# Patient Record
Sex: MALE | Race: WHITE | NOT HISPANIC OR LATINO | Employment: FULL TIME | URBAN - METROPOLITAN AREA
[De-identification: names, ages, dates, MRNs, and addresses within clinical notes are randomized per-mention and may not be internally consistent; named-entity substitution may affect disease eponyms.]

---

## 2017-01-03 ENCOUNTER — TRANSFERRED RECORDS (OUTPATIENT)
Dept: HEALTH INFORMATION MANAGEMENT | Facility: CLINIC | Age: 67
End: 2017-01-03

## 2017-02-02 ENCOUNTER — TELEPHONE (OUTPATIENT)
Dept: INTERNAL MEDICINE | Facility: CLINIC | Age: 67
End: 2017-02-02

## 2017-02-02 DIAGNOSIS — R60.0 PERIPHERAL EDEMA: Primary | ICD-10-CM

## 2017-02-02 RX ORDER — POTASSIUM CHLORIDE 750 MG/1
10 TABLET, EXTENDED RELEASE ORAL DAILY
Qty: 30 TABLET | Refills: 9 | Status: SHIPPED | OUTPATIENT
Start: 2017-02-02 | End: 2017-03-09

## 2017-02-02 NOTE — TELEPHONE ENCOUNTER
potassium chloride       Last Written Prescription Date: 11/29/16  Last Fill Quantity: 30, # refills: 1  Last Office Visit with G, P or [facility] prescribing provider: 12/22/16        Next 5 appointments (look out 90 days)     Feb 03, 2017  8:00 AM   Pre[facility] with [facility]    [address] [place] [address] [phone]                   POTASSIUM   Date Value Ref Range Status   12/09/2016 4.1 3.4 - 5.3 mmol/L Final     CREATININE   Date Value Ref Range Status   12/09/2016 1.10 0.66 - 1.25 mg/dL Final     BP Readings from Last 3 Encounters:   12/22/16 132/80   12/16/16 170/96   12/09/16 176/106

## 2017-02-03 ENCOUNTER — OFFICE VISIT (OUTPATIENT)
Dept: INTERNAL MEDICINE | Facility: CLINIC | Age: 67
End: 2017-02-03
Payer: COMMERCIAL

## 2017-02-03 VITALS
SYSTOLIC BLOOD PRESSURE: 130 MMHG | BODY MASS INDEX: 42.59 KG/M2 | WEIGHT: 265 LBS | OXYGEN SATURATION: 96 % | DIASTOLIC BLOOD PRESSURE: 80 MMHG | TEMPERATURE: 98.2 F | HEART RATE: 87 BPM | HEIGHT: 66 IN

## 2017-02-03 DIAGNOSIS — R60.0 PERIPHERAL EDEMA: ICD-10-CM

## 2017-02-03 DIAGNOSIS — I10 ESSENTIAL HYPERTENSION: ICD-10-CM

## 2017-02-03 DIAGNOSIS — E66.01 MORBID OBESITY DUE TO EXCESS CALORIES (H): ICD-10-CM

## 2017-02-03 DIAGNOSIS — G47.33 OSA (OBSTRUCTIVE SLEEP APNEA): ICD-10-CM

## 2017-02-03 DIAGNOSIS — Z01.818 PREOPERATIVE EXAMINATION: Primary | ICD-10-CM

## 2017-02-03 DIAGNOSIS — F10.20 ALCOHOLISM (H): ICD-10-CM

## 2017-02-03 DIAGNOSIS — I48.19 PERSISTENT ATRIAL FIBRILLATION (H): ICD-10-CM

## 2017-02-03 PROBLEM — R73.01 IMPAIRED FASTING GLUCOSE: Status: ACTIVE | Noted: 2017-02-03

## 2017-02-03 LAB
ANION GAP SERPL CALCULATED.3IONS-SCNC: 7 MMOL/L (ref 3–14)
BUN SERPL-MCNC: 26 MG/DL (ref 7–30)
CALCIUM SERPL-MCNC: 8.8 MG/DL (ref 8.5–10.1)
CHLORIDE SERPL-SCNC: 107 MMOL/L (ref 94–109)
CO2 SERPL-SCNC: 30 MMOL/L (ref 20–32)
CREAT SERPL-MCNC: 1.21 MG/DL (ref 0.66–1.25)
GFR SERPL CREATININE-BSD FRML MDRD: 60 ML/MIN/1.7M2
GLUCOSE SERPL-MCNC: 106 MG/DL (ref 70–99)
HGB BLD-MCNC: 16.2 G/DL (ref 13.3–17.7)
POTASSIUM SERPL-SCNC: 4 MMOL/L (ref 3.4–5.3)
SODIUM SERPL-SCNC: 144 MMOL/L (ref 133–144)

## 2017-02-03 PROCEDURE — 80048 BASIC METABOLIC PNL TOTAL CA: CPT | Performed by: INTERNAL MEDICINE

## 2017-02-03 PROCEDURE — 85018 HEMOGLOBIN: CPT | Performed by: INTERNAL MEDICINE

## 2017-02-03 PROCEDURE — 99215 OFFICE O/P EST HI 40 MIN: CPT | Performed by: INTERNAL MEDICINE

## 2017-02-03 PROCEDURE — 36415 COLL VENOUS BLD VENIPUNCTURE: CPT | Performed by: INTERNAL MEDICINE

## 2017-02-03 PROCEDURE — 93000 ELECTROCARDIOGRAM COMPLETE: CPT | Performed by: INTERNAL MEDICINE

## 2017-02-03 RX ORDER — FUROSEMIDE 20 MG
20 TABLET ORAL DAILY
Start: 2017-02-03 | End: 2017-03-09

## 2017-02-03 RX ORDER — AMLODIPINE BESYLATE 5 MG/1
5 TABLET ORAL DAILY
Start: 2017-02-03 | End: 2017-10-12

## 2017-02-03 NOTE — NURSING NOTE
"Chief Complaint   Patient presents with     Pre-Op Exam     right knee       Initial /80 mmHg  Pulse 87  Temp(Src) 98.2  F (36.8  C) (Oral)  Ht 5' 6\" (1.676 m)  Wt 265 lb (120.203 kg)  BMI 42.79 kg/m2  SpO2 96% Estimated body mass index is 42.79 kg/(m^2) as calculated from the following:    Height as of this encounter: 5' 6\" (1.676 m).    Weight as of this encounter: 265 lb (120.203 kg).  BP completed using cuff size: luis antonio Abarca CMA      "

## 2017-02-03 NOTE — PROGRESS NOTES
[address] [id number] [phone]  Dept: [phone]    PRE-OP EVALUATION:  Today's date: 2/3/2017    Mike Schultz (: 1950) presents for pre-operative evaluation assessment as requested by Dr. Malick Suarez.  He requires evaluation and anesthesia risk assessment prior to undergoing surgery/procedure for treatment of arthritic knee .  Proposed procedure: total right knee    Date of Surgery/ Procedure: 17  Time of Surgery/ Procedure: 7 am  Hospital/Surgical Facility: [facility]  Fax number for surgical facility: [phone]  Primary Physician: Johnny Kay  Type of Anesthesia Anticipated: General    Patient has a Health Care Directive or Living Will:  YES       HPI:                                                      Brief HPI related to upcoming procedure:  End stage DJD, had steroid injections a month ago, but lack of improvement.   Now scheduled for elective total knee arthroplasty.    Additional issues to address:  1.  Follow-up HTN.  Recently on amlodipine, blood pressure was high so increased to 5 mg daily.  Patient is checking outpatient blood pressures, at home.  Results are variable from 106//111, with average of just below 140/90.  He reports no side effects from BP medications.   2.  Regarding ankle edema, this is fully controlled now on lasix.  Voids a lot secondarily.    Taking potassium.  Wondering if should continue.   - following very low carb diet, has lost 22 lbs, some of this fluid weight.  3.  Follow-up obstructive sleep apnea.   Problems with CPAP at first.  Using nightly for 7-10 hours.  4.  Follow-up atrial fibrillation, having failed medication to convert.   Was to have had TKA a month ago, and then start Xarelto.    With TKA delayed, has not been on anticoagulation.   Discussed risks regarding stroke and persistent atrial fib.    5.  Follow-up alcohol abuse, longstanding.   Patient has stopped  alcohol completely 6 weeks ago.  Minor nervousness for a few days, no other withdrawal     MEDICAL HISTORY:                                                      Patient Active Problem List    Diagnosis Date Noted     Ascending aorta dilatation (H) 12/23/2016     Aortic root dilatation       BOWEN (obstructive sleep apnea) 09/30/2016     Split Night:  Sleep Study 9/20/2016 - (274.0 lbs) AHI 74.1, RDI 74.1, Supine AHI 79.1, REM AHI 43.6, Low O2% 61.6%, Time Spent ?88% 106.8, Time Spent ?89% 129.4, CPAP was titrated to a pressure of 15 with an AHI 15.6. Time spent in REM supine at this pressure was - minutes.       Advanced directives, counseling/discussion 01/05/2015     Advance Care Planning:   ACP Review and Resources Provided:  Reviewed chart for advance care plan.  Mike Schultz has no plan or code status on file. Discussed available resources and provided with information. Confirmed code status reflects current choices pending further ACP discussions.  Confirmed/documented designated decision maker(s). See permanent comments section of demographics in clinical tab.   Added by Shanthi Osei on 1/5/2015       Family history of ischemic heart disease 01/05/2015     Father MI 42, brother CABG mid 50's  Patient with perfusion study 7/2012       Gout 01/05/2015     Many episodes in right foot.   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  Often related to alcohol intake       Alcoholism (H)      Prior heavy abuse.  Went through treatments in past  [facility] 2016  Now drinking 5 - 6 shots of vodka equivalent per day as of 9/16/2016.        Atrial fibrillation (HCC)      Cardiologist Dr. Yates  Echo 12/2014 - LVH, borderline LV size, CHARBEL, 1-2+ MR, borderline prox aorta size       Tobacco use disorder, moderate, in sustained remission      dced 6/2004       HYPERLIPIDEMIA LDL GOAL <130 10/31/2010     Morbid obesity      Herpes simplex virus (HSV) infection      Problem list name updated by automated process. Provider to review       Allergic  rhinitis 05/12/2005     Problem list name updated by automated process. Provider to review       Essential hypertension 04/12/2005     Impotence of organic origin 04/12/2005      Past Medical History   Diagnosis Date     Essential hypertension, benign      Tobacco use disorder      dced 6/2004     Herpes simplex without mention of complication      Obesity      Hyperlipidemia      Atrial fibrillation (H)      persistent     H/O ETOH abuse      Past Surgical History   Procedure Laterality Date     C nonspecific procedure  1997     achilles tendon     C nonspecific procedure       knees, arthroscopy     C nonspecific procedure       basal cell skin ca, nose     C nonspecific procedure       flex sig; colonoscopy due 3/2008     C nonspecific procedure  2012     cardioversion for atr fib     Orthopedic surgery       Herniorrhaphy umbilical  8/20/2012     Procedure: HERNIORRHAPHY UMBILICAL;  UMBILICAL HERNIA REPAIR;  Surgeon: Ra Crocker MD;  Location: [facility]     Current Outpatient Prescriptions   Medication Sig Dispense Refill     potassium chloride SA (K-DUR/KLOR-CON M) 10 MEQ CR tablet Take 1 tablet (10 mEq) by mouth daily 30 tablet 9     furosemide (LASIX) 20 MG tablet Take 2 tablets (40 mg) by mouth daily 60 tablet 11     ASPIRIN PO Take 81 mg by mouth       amLODIPine (NORVASC) 5 MG tablet Take 0.5-1 tablets (2.5-5 mg) by mouth daily 90 tablet 1     metoprolol (LOPRESSOR) 50 MG tablet Take 1 tablet (50 mg) by mouth 2 times daily 60 tablet 1     Cyanocobalamin (VITAMIN B 12 PO)        order for DME Equipment ordered: RESMED CPAP Mask type: Full face  Settings: 15       losartan (COZAAR) 100 MG tablet Take 1 tablet (100 mg) by mouth daily 90 tablet 3     indomethacin (INDOCIN) 50 MG capsule Take 1 capsule (50 mg) by mouth 3 times daily as needed for moderate pain with food 30 capsule 1     allopurinol (ZYLOPRIM) 300 MG tablet        finasteride (PROPECIA) 1 MG tablet Take 1 mg by mouth daily.       OTC  "products: herbals and vitamins - cauliflower supplement, detox pill    Allergies   Allergen Reactions     No Known Drug Allergies       Latex Allergy: NO    Social History   Substance Use Topics     Smoking status: Former Smoker -- 1.00 packs/day for 10 years     Types: Cigarettes     Quit date: 08/20/2011     Smokeless tobacco: Never Used     Alcohol Use: Yes      Comment: 4-5 shots nightly vodka     History   Drug Use No       REVIEW OF SYSTEMS:                                                    C: NEGATIVE for fever, chills, unintentional change in weight  I: NEGATIVE for worrisome rashes, moles or lesions  E: NEGATIVE for vision changes or irritation  E/M: NEGATIVE for ear, mouth and throat problems  R: NEGATIVE for significant cough or SOB  B: NEGATIVE for masses, tenderness or discharge  CV: NEGATIVE for chest pain, palpitations, peripheral edema resolved  GI: NEGATIVE for nausea, abdominal pain, heartburn, or change in bowel habits  : NEGATIVE for frequency, dysuria, or hematuria  M: knee pains, otherwise NEGATIVE for significant arthralgias or myalgia.   Some low back pain   N: NEGATIVE for weakness, dizziness or paresthesias  E: NEGATIVE for temperature intolerance, skin/hair changes  H: NEGATIVE for bleeding problems  P: NEGATIVE for changes in mood or affect    EXAM:                                                    /80 mmHg  Pulse 87  Temp(Src) 98.2  F (36.8  C) (Oral)  Ht 5' 6\" (1.676 m)  Wt 265 lb (120.203 kg)  BMI 42.79 kg/m2  SpO2 96%    GENERAL APPEARANCE: healthy, alert and no distress     EYES: Eyes grossly normal to inspection     HENT: ear canals and TM's normal (dull bilat) and nose and mouth without ulcers or lesions     NECK: no adenopathy, no asymmetry, masses, or scars and thyroid normal to palpation     RESP: lungs clear to auscultation - no rales, rhonchi or wheezes     BREAST: normal without masses, tenderness or nipple discharge and no palpable axillary masses or " adenopathy     CV: regular rate and slightly irregular rhythm, normal S1 S2, no S3 or S4 and no murmur, click or rub.  No edema.     ABDOMEN:  soft, nontender, no HSM or masses and bowel sounds normal     MS: extremities normal- no gross deformities noted, no evidence of inflammation in joints, FROM in all extremities.     SKIN: no suspicious lesions or rashes, few seborrheic keratoses      NEURO: mentation intact and speech normal     PSYCH: mentation appears normal. and affect normal/bright     LYMPHATICS: normal ant/post cervical, supraclavicular nodes    DIAGNOSTICS:                                                    EKG: atrial fibrillation, rate controlled, normal axis, normal intervals, no acute ST/T changes c/w ischemia, no LVH by voltage criteria, unchanged from previous tracings    Recent Labs   Lab Test  12/09/16   1232  11/29/16   1840 04/28/16 12/23/14   1527   11/06/13   1549   08/16/12   0951   HGB   --    --   15.7   --   15.2   < >   --    --   16.9   PLT   --    --   299   --   217   < >   --    --    --    INR   --    --    --    --    --    --    --    --   0.95   NA  143  142   --    < >  143   --    --    < >  141   POTASSIUM  4.1  4.3   --    < >  4.3   --    --    < >  4.3   CR  1.10  1.02  1.1   < >  0.85   --    --    < >  1.14   A1C   --    --    --    --   5.8   --   5.5   --    --     < > = values in this interval not displayed.      Basic Metabolic Panel:  NA      144   2/3/2017   POTASSIUM      4.0   2/3/2017  CHLORIDE      107   2/3/2017  ASHELY      8.8   2/3/2017  CO2       30   2/3/2017  BUN       26   2/3/2017  CR     1.21   2/3/2017  GLC      106   2/3/2017    HEMOGLOBIN   Date Value Ref Range Status   02/03/2017 16.2 13.3 - 17.7 g/dL Final   04/28/2016 15.7 13.3 - 17.7 g/dL Final   ]     IMPRESSION:                                                    Reason for surgery/procedure:  End stage DJD both knees    Diagnosis/reason for consult:   1.  HTN, much improved, now  controlled  2.  Obstructive sleep apnea, fully treated  3.  Dependent edema, resolved.   Unclear how much diuretic patient will need long term  4.  Atrial fib, rate controlled, currently not anticoagulated  5.  Alcoholism, completely off alcohol past 6 weeks  6.  Obesity, improvement.       The proposed surgical procedure is considered INTERMEDIATE risk.    REVISED CARDIAC RISK INDEX  The patient has the following serious cardiovascular risks for perioperative complications such as (MI, PE, VFib and 3  AV Block):  No serious cardiac risks  INTERPRETATION: 0 risks: Class I (very low risk - 0.4% complication rate)    The patient has the following additional risks for perioperative complications:  No identified additional risks      ICD-10-CM    1. Preoperative examination Z01.818    2. Persistent atrial fibrillation (H) I48.1    3. Alcoholism (H) F10.20    4. Morbid obesity E66.01    5. Essential hypertension I10    6. BOWEN (obstructive sleep apnea) G47.33        RECOMMENDATIONS:                                                      -- Patient is to take no medications on the day of surgery   -- Decrease furosemide to 20 mg daily.   If doing well, would try to decrease dose further over time.  -- Discontinue aspirin 7-10 days prior to procedure to reduce bleeding risk.  It should be resumed post-operatively.  -- Tentative plan to start anticoagulation after surgery, in which case would stop the aspirin     -- Consult hospital rounder / IM to assist post-op medical management.  Patient is to follow-up with me a month or 2 after his surgical procedure.    APPROVAL GIVEN to proceed with proposed procedure, without further diagnostic evaluation       Signed Electronically by: Johnny Kay MD    Copy of this evaluation report is provided to requesting physician.    [facility] Preop Guidelines

## 2017-02-03 NOTE — MR AVS SNAPSHOT
After Visit Summary   2/3/2017    Mike Schultz    MRN: [id number]           Patient Information     Date Of Birth          1950        Visit Information        Provider Department      2/3/2017 8:00 AM Johnny Kay MD [facility]        Today's Diagnoses     Preoperative examination    -  1     Persistent atrial fibrillation (H)         Alcoholism (H)         Morbid obesity         Essential hypertension         BOWEN (obstructive sleep apnea)         Peripheral edema           Care Instructions      Before Your Surgery      Call your surgeon if there is any change in your health. This includes signs of a cold or flu (such as a sore throat, runny nose, cough, rash or fever).    Do not smoke, drink alcohol or take over the counter medicine (unless your surgeon or primary care doctor tells you to) for the 24 hours before and after surgery.    If you take prescribed drugs: Follow your doctor s orders about which medicines to take and which to stop until after surgery.    Eating and drinking prior to surgery: follow the instructions from your surgeon    Take a shower or bath the night before surgery. Use the soap your surgeon gave you to gently clean your skin. If you do not have soap from your surgeon, use your regular soap. Do not shave or scrub the surgery site.  Wear clean pajamas and have clean sheets on your bed.       RECOMMENDATIONS:                                                      -- Patient is to take no medications on the day of surgery   -- Decrease furosemide to 20 mg daily.   If doing well, would try to decrease dose further  -- Discontinue aspirin 7-10 days prior to procedure to reduce bleeding risk.  It should be resumed post-operatively.  -- Tentative plan to start anticoagulation after surgery, in which case would stop the aspirin         Follow-ups after your visit        Your next 10 appointments already scheduled     Mar 03, 2017  9:00 AM  "  Return Sleep Patient with Branden Anaya MD   [facility] ([facility])    [address] [address] [phone]              Who to contact     If you have questions or need follow up information about today's clinic visit or your schedule please contact [facility] directly at [phone].  Normal or non-critical lab and imaging results will be communicated to you by [employer], letter or phone within 4 business days after the clinic has received the results. If you do not hear from us within 7 days, please contact the clinic through [employer] or phone. If you have a critical or abnormal lab result, we will notify you by phone as soon as possible.  Submit refill requests through [employer] or call your pharmacy and they will forward the refill request to us. Please allow 3 business days for your refill to be completed.          Additional Information About Your Visit        [employer] Information     [employer] gives you secure access to your electronic health record. If you see a primary care provider, you can also send messages to your care team and make appointments. If you have questions, please call your primary care clinic.  If you do not have a primary care provider, please call [phone] and they will assist you.        Care EveryWhere ID     This is your Care EveryWhere ID. This could be used by other organizations to access your [place] medical records  [id number]        Your Vitals Were     Pulse Temperature Height BMI (Body Mass Index) Pulse Oximetry       87 98.2  F (36.8  C) (Oral) 5' 6\" (1.676 m) 42.79 kg/m2 96%        Blood Pressure from Last 3 Encounters:   02/03/17 130/80   12/22/16 132/80   12/16/16 170/96    Weight from Last 3 Encounters:   02/03/17 265 lb (120.203 kg)   12/22/16 287 lb 9 oz (130.437 kg)   12/16/16 280 lb (127.007 kg)              We Performed the Following     Basic metabolic " panel     EKG 12-lead complete w/read - Clinics     Hemoglobin          Today's Medication Changes          These changes are accurate as of: 2/3/17  9:30 AM.  If you have any questions, ask your nurse or doctor.               These medicines have changed or have updated prescriptions.        Dose/Directions    amLODIPine 5 MG tablet   Commonly known as:  NORVASC   This may have changed:  how much to take   Used for:  Essential hypertension   Changed by:  Johnny Kay MD        Dose:  5 mg   Take 1 tablet (5 mg) by mouth daily   Refills:  0       furosemide 20 MG tablet   Commonly known as:  LASIX   This may have changed:  how much to take   Used for:  Peripheral edema   Changed by:  Johnny Kay MD        Dose:  20 mg   Take 1 tablet (20 mg) by mouth daily   Refills:  0            Where to get your medicines      Some of these will need a paper prescription and others can be bought over the counter.  Ask your nurse if you have questions.     You don't need a prescription for these medications    - amLODIPine 5 MG tablet  - furosemide 20 MG tablet             Primary Care Provider Office Phone # Fax #    Johnny Kay [phone]       [facility] [address] [place] [address]        Thank you!     Thank you for choosing [facility]  for your care. Our goal is always to provide you with excellent care. Hearing back from our patients is one way we can continue to improve our services. Please take a few minutes to complete the written survey that you may receive in the mail after your visit with us. Thank you!             Your Updated Medication List - Protect others around you: Learn how to safely use, store and throw away your medicines at www.disposemymeds.org.          This list is accurate as of: 2/3/17  9:30 AM.  Always use your most recent med list.                   Brand Name Dispense Instructions for use    allopurinol 300 MG  tablet    ZYLOPRIM         amLODIPine 5 MG tablet    NORVASC     Take 1 tablet (5 mg) by mouth daily       ASPIRIN PO      Take 81 mg by mouth       furosemide 20 MG tablet    LASIX     Take 1 tablet (20 mg) by mouth daily       indomethacin 50 MG capsule    INDOCIN    30 capsule    Take 1 capsule (50 mg) by mouth 3 times daily as needed for moderate pain with food       losartan 100 MG tablet    COZAAR    90 tablet    Take 1 tablet (100 mg) by mouth daily       metoprolol 50 MG tablet    LOPRESSOR    60 tablet    Take 1 tablet (50 mg) by mouth 2 times daily       order for DME      Equipment ordered: RESMED CPAP Mask type: Full face  Settings: 15       potassium chloride SA 10 MEQ CR tablet    K-DUR/KLOR-CON M    30 tablet    Take 1 tablet (10 mEq) by mouth daily       PROPECIA 1 MG tablet   Generic drug:  finasteride      Take 1 mg by mouth daily.       VITAMIN B 12 PO

## 2017-02-06 ENCOUNTER — TRANSFERRED RECORDS (OUTPATIENT)
Dept: HEALTH INFORMATION MANAGEMENT | Facility: CLINIC | Age: 67
End: 2017-02-06

## 2017-02-08 ENCOUNTER — TRANSFERRED RECORDS (OUTPATIENT)
Dept: HEALTH INFORMATION MANAGEMENT | Facility: CLINIC | Age: 67
End: 2017-02-08

## 2017-02-10 DIAGNOSIS — I48.91 ATRIAL FIBRILLATION, UNSPECIFIED TYPE (H): ICD-10-CM

## 2017-02-10 DIAGNOSIS — I10 ESSENTIAL HYPERTENSION: Primary | ICD-10-CM

## 2017-02-10 RX ORDER — METOPROLOL TARTRATE 50 MG
50 TABLET ORAL 2 TIMES DAILY
Qty: 60 TABLET | Refills: 3 | Status: SHIPPED | OUTPATIENT
Start: 2017-02-10 | End: 2017-06-21

## 2017-02-22 ENCOUNTER — TRANSFERRED RECORDS (OUTPATIENT)
Dept: HEALTH INFORMATION MANAGEMENT | Facility: CLINIC | Age: 67
End: 2017-02-22

## 2017-02-24 ENCOUNTER — TELEPHONE (OUTPATIENT)
Dept: INTERNAL MEDICINE | Facility: CLINIC | Age: 67
End: 2017-02-24

## 2017-02-25 ENCOUNTER — OFFICE VISIT (OUTPATIENT)
Dept: URGENT CARE | Facility: URGENT CARE | Age: 67
End: 2017-02-25
Payer: COMMERCIAL

## 2017-02-25 VITALS
BODY MASS INDEX: 42.11 KG/M2 | DIASTOLIC BLOOD PRESSURE: 78 MMHG | TEMPERATURE: 97.7 F | SYSTOLIC BLOOD PRESSURE: 118 MMHG | HEART RATE: 89 BPM | OXYGEN SATURATION: 94 % | WEIGHT: 260.9 LBS

## 2017-02-25 DIAGNOSIS — M25.571 ACUTE RIGHT ANKLE PAIN: Primary | ICD-10-CM

## 2017-02-25 DIAGNOSIS — M10.00 ACUTE IDIOPATHIC GOUT, UNSPECIFIED SITE: ICD-10-CM

## 2017-02-25 PROCEDURE — 99214 OFFICE O/P EST MOD 30 MIN: CPT | Performed by: PHYSICIAN ASSISTANT

## 2017-02-25 RX ORDER — PREDNISONE 20 MG/1
40 TABLET ORAL DAILY
Qty: 10 TABLET | Refills: 0 | Status: SHIPPED | OUTPATIENT
Start: 2017-02-25 | End: 2017-03-02

## 2017-02-25 RX ORDER — ACETAMINOPHEN 325 MG/1
650 TABLET ORAL PRN
COMMUNITY
Start: 2017-02-13 | End: 2019-03-12

## 2017-02-25 RX ORDER — LOSARTAN POTASSIUM 100 MG/1
100 TABLET ORAL
COMMUNITY
Start: 2016-08-19 | End: 2017-03-09 | Stop reason: ALTCHOICE

## 2017-02-25 RX ORDER — SENNOSIDES A AND B 8.6 MG/1
8.6 TABLET, FILM COATED ORAL
COMMUNITY
Start: 2017-02-16 | End: 2018-03-02

## 2017-02-25 RX ORDER — PREDNISONE 20 MG/1
20 TABLET ORAL
COMMUNITY
Start: 2017-02-21 | End: 2017-03-09

## 2017-02-25 RX ORDER — OXYCODONE HYDROCHLORIDE 5 MG/1
5-10 TABLET ORAL
COMMUNITY
Start: 2017-02-13 | End: 2017-03-09

## 2017-02-25 NOTE — MR AVS SNAPSHOT
After Visit Summary   2/25/2017    Mike Schultz    MRN: [id number]           Patient Information     Date Of Birth          1950        Visit Information        Provider Department      2/25/2017 2:30 PM Jovani Silverio PA-C [facility]        Today's Diagnoses     Acute right ankle pain    -  1    Acute idiopathic gout, unspecified site           Follow-ups after your visit        Your next 10 appointments already scheduled     Mar 03, 2017  9:00 AM CST   Return Sleep Patient with Branden Anaya MD   [facility] ([facility])    [address] [place] [id number]   [phone]            Mar 28, 2017  9:00 AM CDT   Office Visit with Johnny Kay MD   [facility][1] ([facility][1])    [address] [address] [phone]           Bring a current list of meds and any records pertaining to this visit.  For Physicals, please bring immunization records and any forms needing to be filled out.  Please arrive 10 minutes early to complete paperwork.              Who to contact     If you have questions or need follow up information about today's clinic visit or your schedule please contact [facility] directly at [phone].  Normal or non-critical lab and imaging results will be communicated to you by MyChart, letter or phone within 4 business days after the clinic has received the results. If you do not hear from us within 7 days, please contact the clinic through MyChart or phone. If you have a critical or abnormal lab result, we will notify you by phone as soon as possible.  Submit refill requests through [employer] or call your pharmacy and they will forward the refill request to us. Please allow 3 business days for your refill to be completed.          Additional Information About Your Visit         Flypad Information     [employer] gives you secure access to your electronic health record. If you see a primary care provider, you can also send messages to your care team and make appointments. If you have questions, please call your primary care clinic.  If you do not have a primary care provider, please call [phone] and they will assist you.        Care EveryWhere ID     This is your Care EveryWhere ID. This could be used by other organizations to access your [place] medical records  [id number]        Your Vitals Were     Pulse Temperature Pulse Oximetry BMI (Body Mass Index)          89 97.7  F (36.5  C) (Oral) 94% 42.11 kg/m2         Blood Pressure from Last 3 Encounters:   02/25/17 118/78   02/03/17 130/80   12/22/16 132/80    Weight from Last 3 Encounters:   02/25/17 260 lb 14.4 oz (118.3 kg)   02/03/17 265 lb (120.2 kg)   12/22/16 287 lb 9 oz (130.4 kg)              Today, you had the following     No orders found for display         Today's Medication Changes          These changes are accurate as of: 2/25/17 11:59 PM.  If you have any questions, ask your nurse or doctor.               These medicines have changed or have updated prescriptions.        Dose/Directions    * predniSONE 20 MG tablet   Commonly known as:  DELTASONE   This may have changed:  Another medication with the same name was added. Make sure you understand how and when to take each.   Changed by:  Jovani Silverio PA-C        Dose:  20 mg   Take 20 mg by mouth   Refills:  0       * predniSONE 20 MG tablet   Commonly known as:  DELTASONE   This may have changed:  You were already taking a medication with the same name, and this prescription was added. Make sure you understand how and when to take each.   Used for:  Acute right ankle pain, Acute idiopathic gout, unspecified site   Changed by:  Jovani Silverio PA-C        Dose:  40 mg   Take 2 tablets (40 mg) by mouth daily for 5 days   Quantity:  10 tablet   Refills:  0       *  Notice:  This list has 2 medication(s) that are the same as other medications prescribed for you. Read the directions carefully, and ask your doctor or other care provider to review them with you.         Where to get your medicines      These medications were sent to [facility] [place], [place] - [address]  [address] [address]     Phone:  [phone]     predniSONE 20 MG tablet                Primary Care Provider Office Phone # Fax #    Johnny Kay [phone]       [facility] [address]  [facility] [address]        Thank you!     Thank you for choosing [facility]  for your care. Our goal is always to provide you with excellent care. Hearing back from our patients is one way we can continue to improve our services. Please take a few minutes to complete the written survey that you may receive in the mail after your visit with us. Thank you!             Your Updated Medication List - Protect others around you: Learn how to safely use, store and throw away your medicines at www.disposemymeds.org.          This list is accurate as of: 2/25/17 11:59 PM.  Always use your most recent med list.                   Brand Name Dispense Instructions for use    acetaminophen 325 MG tablet    TYLENOL     Take 650 mg by mouth       allopurinol 300 MG tablet    ZYLOPRIM         amLODIPine 5 MG tablet    NORVASC     Take 1 tablet (5 mg) by mouth daily       ASPIRIN PO      Take 81 mg by mouth       furosemide 20 MG tablet    LASIX     Take 1 tablet (20 mg) by mouth daily       indomethacin 50 MG capsule    INDOCIN    30 capsule    Take 1 capsule (50 mg) by mouth 3 times daily as needed for moderate pain with food       * losartan 100 MG tablet    COZAAR    90 tablet    Take 1 tablet (100 mg) by mouth daily       * losartan 100 MG tablet    COZAAR     Take 100 mg by mouth       metoprolol 50 MG tablet    LOPRESSOR     60 tablet    Take 1 tablet (50 mg) by mouth 2 times daily       order for DME      Equipment ordered: RESMED CPAP Mask type: Full face  Settings: 15       oxyCODONE 5 MG IR tablet    ROXICODONE     Take 5-10 mg by mouth       potassium chloride SA 10 MEQ CR tablet    K-DUR/KLOR-CON M    30 tablet    Take 1 tablet (10 mEq) by mouth daily       * predniSONE 20 MG tablet    DELTASONE     Take 20 mg by mouth       * predniSONE 20 MG tablet    DELTASONE    10 tablet    Take 2 tablets (40 mg) by mouth daily for 5 days       PROPECIA 1 MG tablet   Generic drug:  finasteride      Take 1 mg by mouth daily.       rivaroxaban ANTICOAGULANT 10 MG Tabs tablet    XARELTO     take 10 mg daily x 5 days, starting 2/16 and then transition to 20 mg daily dose.  Patient has chronic atrial fibrillation.  Thromboembolism prevention  Indications: Thrombosis prevention following orthopedic surgery.       senna 8.6 MG tablet    SENOKOT     Take 8.6 mg by mouth       VITAMIN B 12 PO          * Notice:  This list has 4 medication(s) that are the same as other medications prescribed for you. Read the directions carefully, and ask your doctor or other care provider to review them with you.

## 2017-02-25 NOTE — NURSING NOTE
"Chief Complaint   Patient presents with     Foot Pain     Rt foot pain and swelling        Initial /78 (BP Location: Right arm, Patient Position: Chair, Cuff Size: Adult Regular)  Pulse 89  Temp 97.7  F (36.5  C) (Oral)  Wt 260 lb 14.4 oz (118.3 kg)  SpO2 94%  BMI 42.11 kg/m2 Estimated body mass index is 42.11 kg/(m^2) as calculated from the following:    Height as of 2/3/17: 5' 6\" (1.676 m).    Weight as of this encounter: 260 lb 14.4 oz (118.3 kg).    "

## 2017-02-27 NOTE — TELEPHONE ENCOUNTER
Pt came to the [facility] on 2/25/17 and got Prednisone for 5 days. Would like to see Dr Kay for a medication Review.  Call transferred to schedulers.    Keyon RODRIGUEZ

## 2017-02-27 NOTE — PROGRESS NOTES
SUBJECTIVE:     Mike Schultz is a 66 year old male presenting with a chief complaint of right ankle pain and swelling.  Onset of symptoms was 2 day(s) ago.  Course of illness is worsening.    Severity moderate  Current and Associated symptoms: right ankle pain  Treatment measures tried include:  Completed a course of prednisone; .  Predisposing factors include HX of Gout.    Patient is S/P right TKA on 2/13/2017.  He had a gouty flare February 11, prior to his surgery.  He used indomethacin but had discontinue it because of his perioperative treatment of his TKA.  He was then given prednisone for treatment since he is on anticoagulation therapy for his post-op therapy.  He is also trying to lose weight after surgery and went on a protein diet and the patient thinks it precipitated his flare after completing the first course of prednisone.  At this time he has pain in the right ankle and foot and no other joints.    Past Medical History   Diagnosis Date     Atrial fibrillation (H)      persistent     Essential hypertension, benign      H/O ETOH abuse      Herpes simplex without mention of complication      Hyperlipidemia      Obesity      Tobacco use disorder      dced 6/2004     Current Outpatient Prescriptions   Medication Sig Dispense Refill     acetaminophen (TYLENOL) 325 MG tablet Take 650 mg by mouth       oxyCODONE (ROXICODONE) 5 MG IR tablet Take 5-10 mg by mouth       rivaroxaban ANTICOAGULANT (XARELTO) 10 MG TABS tablet take 10 mg daily x 5 days, starting 2/16 and then transition to 20 mg daily dose.  Patient has chronic atrial fibrillation.  Thromboembolism prevention  Indications: Thrombosis prevention following orthopedic surgery.       losartan (COZAAR) 100 MG tablet Take 100 mg by mouth       senna (SENOKOT) 8.6 MG tablet Take 8.6 mg by mouth       predniSONE (DELTASONE) 20 MG tablet Take 20 mg by mouth       predniSONE (DELTASONE) 20 MG tablet Take 2 tablets (40 mg) by mouth daily for 5 days 10  tablet 0     metoprolol (LOPRESSOR) 50 MG tablet Take 1 tablet (50 mg) by mouth 2 times daily 60 tablet 3     amLODIPine (NORVASC) 5 MG tablet Take 1 tablet (5 mg) by mouth daily       furosemide (LASIX) 20 MG tablet Take 1 tablet (20 mg) by mouth daily       potassium chloride SA (K-DUR/KLOR-CON M) 10 MEQ CR tablet Take 1 tablet (10 mEq) by mouth daily 30 tablet 9     Cyanocobalamin (VITAMIN B 12 PO)        allopurinol (ZYLOPRIM) 300 MG tablet        ASPIRIN PO Take 81 mg by mouth       order for DME Equipment ordered: RESMED CPAP Mask type: Full face  Settings: 15       losartan (COZAAR) 100 MG tablet Take 1 tablet (100 mg) by mouth daily 90 tablet 3     indomethacin (INDOCIN) 50 MG capsule Take 1 capsule (50 mg) by mouth 3 times daily as needed for moderate pain with food 30 capsule 1     finasteride (PROPECIA) 1 MG tablet Take 1 mg by mouth daily.       Social History   Substance Use Topics     Smoking status: Former Smoker     Packs/day: 1.00     Years: 10.00     Types: Cigarettes     Quit date: 8/20/2011     Smokeless tobacco: Never Used     Alcohol use Yes      Comment: 4-5 shots nightly vodka       ROS:  Review of systems negative except as stated above.    OBJECTIVE  :/78 (BP Location: Right arm, Patient Position: Chair, Cuff Size: Adult Regular)  Pulse 89  Temp 97.7  F (36.5  C) (Oral)  Wt 260 lb 14.4 oz (118.3 kg)  SpO2 94%  BMI 42.11 kg/m2  GENERAL APPEARANCE: healthy, alert and no distress  EYES: EOMI,  PERRL, conjunctiva clear  HENT: ear canals and TM's normal.  Nose and mouth without ulcers, erythema or lesions  NECK: supple, nontender, no lymphadenopathy  RESP: lungs clear to auscultation - no rales, rhonchi or wheezes  CV: regular rates and rhythm, normal S1 S2, no murmur noted  ABDOMEN:  soft, nontender, no HSM or masses and bowel sounds normal  NEURO: Normal strength and tone, sensory exam grossly normal,  normal speech and mentation  ORTHO: right knee with surgical dressing in place.   Non-reactive.  Warm to touch but not erthematous. Knee without effusion.  Ambulating fully with weight bearing.  Right ankle without effusion.  Only mild edema in foot and ankle.  No pitting edema. Pulses intact. Plantar flexion to 45 deg, and dorsiflexion to 15 degrees.  SKIN: no suspicious lesions or rashes    ASSESSMENT:  Right Ankle pain  PMH of gout    PLAN:  Patient declined xray and labs today.  Prednisone 40 mg PO daily x 5 days. Cautioned regarding risks and benefits and indication for return.  Follow up with Orthopedics on Monday for recheck of symptoms, sooner if sequelae.

## 2017-03-06 ENCOUNTER — TELEPHONE (OUTPATIENT)
Dept: INTERNAL MEDICINE | Facility: CLINIC | Age: 67
End: 2017-03-06

## 2017-03-06 NOTE — TELEPHONE ENCOUNTER
Reason for Call:  Same Day Appointment, Requested Provider:  Dr Kay     PCP: Johnny Kay    Reason for visit: Pt had a knee replacement.  He wants to come in for a med review.  He was put on blood thinners and was told not to take indomethacin.  He was put on prednisone for 5 days for the gout.  He is looking for an alternative for the indomethacin.  He is having a mild case of gout now.  He has an appt with Dr Santana 3/28, but wants to be seen sooner.    Duration of symptoms:     Have you been treated for this in the past? Yes    Additional comments:     Can we leave a detailed message on this number? YES    Phone number patient can be reached at: Cell number on file:    Telephone Information:   Mobile [phone]       Best Time: anytime    Call taken on 3/6/2017 at 9:41 AM by DIONISIO DO

## 2017-03-07 NOTE — TELEPHONE ENCOUNTER
ALAN Coleman    Talked to pt today morning. He took prednisone during the acute flare a couple of days ago and took 2 tabs of Indomethacin 50 mg today morning for the Gout pain and it did help with the pain. Even though he knew he was advised not to take it while he is on Xarelto. Currently has no pain and seeing you on 3/9/17 at 4 pm.    Keyon RODRIGUEZ

## 2017-03-07 NOTE — TELEPHONE ENCOUNTER
I could add him on at the end of the day, 5 pm spot.   He will likely need to wait a bit, and hopefully could come in just a few minutes early, to assist with staff leaving on time.    For now, doing the prednisone for his gout flare makes sense.  Is the prednisone working?  If so, would like to get his dose down soon.

## 2017-03-09 ENCOUNTER — OFFICE VISIT (OUTPATIENT)
Dept: INTERNAL MEDICINE | Facility: CLINIC | Age: 67
End: 2017-03-09
Payer: COMMERCIAL

## 2017-03-09 VITALS
HEIGHT: 66 IN | TEMPERATURE: 98.3 F | BODY MASS INDEX: 42.65 KG/M2 | SYSTOLIC BLOOD PRESSURE: 130 MMHG | OXYGEN SATURATION: 96 % | WEIGHT: 265.4 LBS | DIASTOLIC BLOOD PRESSURE: 86 MMHG | HEART RATE: 99 BPM

## 2017-03-09 DIAGNOSIS — R60.0 PERIPHERAL EDEMA: ICD-10-CM

## 2017-03-09 DIAGNOSIS — M10.9 GOUT OF FOOT, UNSPECIFIED CAUSE, UNSPECIFIED CHRONICITY, UNSPECIFIED LATERALITY: Primary | ICD-10-CM

## 2017-03-09 DIAGNOSIS — I48.19 PERSISTENT ATRIAL FIBRILLATION (H): ICD-10-CM

## 2017-03-09 DIAGNOSIS — Z96.651 STATUS POST TOTAL RIGHT KNEE REPLACEMENT: ICD-10-CM

## 2017-03-09 PROCEDURE — 99214 OFFICE O/P EST MOD 30 MIN: CPT | Performed by: INTERNAL MEDICINE

## 2017-03-09 RX ORDER — FUROSEMIDE 20 MG
40 TABLET ORAL DAILY
Qty: 180 TABLET | Status: SHIPPED | OUTPATIENT
Start: 2017-03-09 | End: 2017-12-26 | Stop reason: DRUGHIGH

## 2017-03-09 RX ORDER — OXYCODONE HYDROCHLORIDE 5 MG/1
5-10 TABLET ORAL EVERY 6 HOURS PRN
Qty: 15 TABLET | Refills: 0 | Status: SHIPPED | OUTPATIENT
Start: 2017-03-09 | End: 2017-10-12

## 2017-03-09 RX ORDER — PREDNISONE 20 MG/1
20 TABLET ORAL
Status: CANCELLED | OUTPATIENT
Start: 2017-03-09

## 2017-03-09 RX ORDER — OXYCODONE HYDROCHLORIDE 5 MG/1
5-10 TABLET ORAL
COMMUNITY
Start: 2017-02-28 | End: 2017-03-09

## 2017-03-09 RX ORDER — OXYCODONE HCL 10 MG/1
TABLET, FILM COATED, EXTENDED RELEASE ORAL
COMMUNITY
Start: 2017-02-22 | End: 2017-03-09

## 2017-03-09 RX ORDER — POTASSIUM CHLORIDE 750 MG/1
20 TABLET, EXTENDED RELEASE ORAL DAILY
Qty: 180 TABLET | Status: SHIPPED | OUTPATIENT
Start: 2017-03-09 | End: 2017-12-26 | Stop reason: DRUGHIGH

## 2017-03-09 NOTE — MR AVS SNAPSHOT
After Visit Summary   3/9/2017    Mike Schultz    MRN: [id number]           Patient Information     Date Of Birth          1950        Visit Information        Provider Department      3/9/2017 4:00 PM Johnny Kay MD [facility]        Today's Diagnoses     Gout of foot, unspecified cause, unspecified chronicity, unspecified laterality    -  1    Peripheral edema        Status post total right knee replacement        Persistent atrial fibrillation (H)          Care Instructions    PLAN:                                                      1.  MEDICATIONS:        - Increase furosemide (lasix) to 40 mg daily       - Increase potassium to 2 daily       - Continue other medications without change -->  Wean down on pain medication   2.  Small refill of oxycodone -->  Get further refills from orthopedist  3.  While on xarelto, if treatment for gout is needed, also take omeprazole 20 mg daily (prilosec).  4.  OK short course of indocin as needed, as long as taking the omeprazole  5.  Follow-up in 3-4 months, keep track of gout flares      What are purines and in which foods are they found?  Introduction  Purines are natural substances found in all of the body's cells, and in virtually all foods. The reason for their widespread occurrence is simple: purines provide part of the chemical structure of our genes and the genes of plants and animals. A relatively small number of foods, however, contain concentrated amounts of purines. For the most part, these high-purine foods are also high-protein foods, and they include organ meats like kidney, fish like mackerel, herring, sardines and mussels, and also yeast.   Purines are metabolized into uric acid  When cells die and get recycled, the purines in their genetic material also get broken down. Uric acid is the chemical formed when purines have been broken down completely. It's normal and healthy for uric acid to be formed in  "the body from breakdown of purines. In our blood, for example, uric acid serves as an antioxidant and helps prevent damage to our blood vessel linings, so a continual supply of uric acid is important for protecting our blood vessels.   Uric acid levels in the blood and other parts of the body can become too high, however, under a variety of circumstances. Since our kidneys are responsible for helping keep blood levels of uric acid balanced, kidney problems can lead to excessive accumulation of uric acid in various parts of the body. Excessive breakdown of cells can also cause uric acid build-up. When uric acid accumulates, uric acid crystals (called monosodium urate crystals) can become deposited in our tendons, joints, kidneys, and other organs. This accumulation of uric acid crystals is called gouty arthritis, or simply \"gout.\"   Foods that contain purines  Because uric acid is formed from the breakdown of purines, low-purine diets are often used to help treat conditions like gout in which excessive uric acid is deposited in the tissues of the body. The average daily diet for an adult in the [place] contains approximately 600-1,000 milligrams of purines.   Recent research by Ricci and others has shown that the impact of plant purines on gout risk is very different from the impact of animal purines, and that within the animal food family, purines from meat and fish act very differently than purines from dairy. Ricci's work has demonstrated that purines from meat and fish clearly increase our risk of gout, while purines from vegetables fail to change our risk. Dairy foods (which can contain purines) actually appear to lower our risk of gout. In summary, this epidemiological research (on tens of thousands of men and women) makes it clear that all purine-containing foods are not the same, and that plant purines are far safer than meat and fish purines in terms of gout risk.   In a case of severe or advanced gout, " dietitians will often ask individuals to decrease their total daily purine intake to 100-150 milligrams. A 3.5 ounce serving of some foods, all by itself, can contain up to 1,000 milligrams of purines. These foods include anchovies, herring, kidney, liver, mackerel, meat extracts, mincemeat, mussels, sardines, and yeast. You'll notice that only of these foods - liver - is included amongst the World's Healthiest Foods. The table below lists other foods that contain higher-than-usual amounts of purines, though not nearly as much as the foods described above.   Foods with very high purine levels(up to 1,000 mg per 3.5 ounce serving): Anchovies, Brains, Gravies, Kidneys,Liver, Sardines, Sweetbreads   Foods with high and moderately high purine levels(5-100 mg per 3.5 ounch serving): Asparagus, Castro, Beef, Bluefish, Bouillon, Calf tongue, Carp, Cauliflower, Chicken, Chicken soup, Codfish, Crab, Duck, Goose, Halibut, Ham, Kidney beans, Lamb, Lentils, Lima beans, Lobster, Mushrooms, Mutton, Navy beans, Oatmeal, Oysters, Peas, Perch, Pork, Rabbit, [place], Sheep, Shellfish, Snapper, Spinach, Tripe, Trout, Tuna, Turkey, Veal, Venison             Follow-ups after your visit        Your next 10 appointments already scheduled     Mar 28, 2017  9:00 AM CDT   [facility]    [address] [address] [phone]           Bring a current list of meds and any records pertaining to this visit.  For Physicals, please bring immunization records and any forms needing to be filled out.  Please arrive 10 minutes early to complete paperwork.              Future tests that were ordered for you today     Open Future Orders        Priority Expected Expires Ordered    Basic metabolic panel Routine 3/9/2017 3/9/2018 3/9/2017            Who to contact     If you have questions or need follow up information about today's  "clinic visit or your schedule please contact [facility] directly at [phone].  Normal or non-critical lab and imaging results will be communicated to you by [employer], letter or phone within 4 business days after the clinic has received the results. If you do not hear from us within 7 days, please contact the clinic through [employer] or phone. If you have a critical or abnormal lab result, we will notify you by phone as soon as possible.  Submit refill requests through [employer] or call your pharmacy and they will forward the refill request to us. Please allow 3 business days for your refill to be completed.          Additional Information About Your Visit        [employer] Information     [employer] gives you secure access to your electronic health record. If you see a primary care provider, you can also send messages to your care team and make appointments. If you have questions, please call your primary care clinic.  If you do not have a primary care provider, please call [phone] and they will assist you.        Care EveryWhere ID     This is your Care EveryWhere ID. This could be used by other organizations to access your [place] medical records  [id number]        Your Vitals Were     Pulse Temperature Height Pulse Oximetry BMI (Body Mass Index)       99 98.3  F (36.8  C) (Oral) 5' 6\" (1.676 m) 96% 42.84 kg/m2        Blood Pressure from Last 3 Encounters:   03/09/17 130/86   02/25/17 118/78   02/03/17 130/80    Weight from Last 3 Encounters:   03/09/17 265 lb 6.4 oz (120.4 kg)   02/25/17 260 lb 14.4 oz (118.3 kg)   02/03/17 265 lb (120.2 kg)                 Today's Medication Changes          These changes are accurate as of: 3/9/17  5:11 PM.  If you have any questions, ask your nurse or doctor.               These medicines have changed or have updated prescriptions.        Dose/Directions    furosemide 20 MG tablet   Commonly known as:  LASIX   This may have changed:  how much to take "   Used for:  Peripheral edema   Changed by:  Johnny aKy MD        Dose:  40 mg   Take 2 tablets (40 mg) by mouth daily   Quantity:  180 tablet   Refills:  prn       losartan 100 MG tablet   Commonly known as:  COZAAR   This may have changed:  Another medication with the same name was removed. Continue taking this medication, and follow the directions you see here.   Used for:  Essential hypertension with goal blood pressure less than 140/90   Changed by:  Johnny Kay MD        Dose:  100 mg   Take 1 tablet (100 mg) by mouth daily   Quantity:  90 tablet   Refills:  3       oxyCODONE 5 MG IR tablet   Commonly known as:  ROXICODONE   This may have changed:    - when to take this  - reasons to take this   Used for:  Status post total right knee replacement   Changed by:  Johnny Kay MD        Dose:  5-10 mg   Take 1-2 tablets (5-10 mg) by mouth every 6 hours as needed for moderate to severe pain   Quantity:  15 tablet   Refills:  0       potassium chloride SA 10 MEQ CR tablet   Commonly known as:  K-DUR/KLOR-CON M   This may have changed:  how much to take   Used for:  Peripheral edema   Changed by:  Johnny Kay MD        Dose:  20 mEq   Take 2 tablets (20 mEq) by mouth daily   Quantity:  180 tablet   Refills:  prn       rivaroxaban ANTICOAGULANT 10 MG Tabs tablet   Commonly known as:  XARELTO   This may have changed:  See the new instructions.   Used for:  Status post total right knee replacement   Changed by:  Johnny Kay MD        Dose:  20 mg   Take 2 tablets (20 mg) by mouth daily   Refills:  0            Where to get your medicines      These medications were sent to [id number] [address] [facility]  [address] [place] [address] [facility] [address]     Phone:  [phone]     furosemide 20 MG tablet    potassium chloride SA 10 MEQ CR tablet         Some of these will need a paper prescription and others can be bought over the counter.  Ask your nurse  if you have questions.     Bring a paper prescription for each of these medications     oxyCODONE 5 MG IR tablet                Primary Care Provider Office Phone # Fax #    Johnny Kay [phone]       [facility] [address] [facility] [address]        Thank you!     Thank you for choosing [facility]  for your care. Our goal is always to provide you with excellent care. Hearing back from our patients is one way we can continue to improve our services. Please take a few minutes to complete the written survey that you may receive in the mail after your visit with us. Thank you!             Your Updated Medication List - Protect others around you: Learn how to safely use, store and throw away your medicines at www.disposemymeds.org.          This list is accurate as of: 3/9/17  5:11 PM.  Always use your most recent med list.                   Brand Name Dispense Instructions for use    acetaminophen 325 MG tablet    TYLENOL     Take 650 mg by mouth       allopurinol 300 MG tablet    ZYLOPRIM         amLODIPine 5 MG tablet    NORVASC     Take 1 tablet (5 mg) by mouth daily       ASPIRIN PO      Take 81 mg by mouth       furosemide 20 MG tablet    LASIX    180 tablet    Take 2 tablets (40 mg) by mouth daily       indomethacin 50 MG capsule    INDOCIN    30 capsule    Take 1 capsule (50 mg) by mouth 3 times daily as needed for moderate pain with food       losartan 100 MG tablet    COZAAR    90 tablet    Take 1 tablet (100 mg) by mouth daily       metoprolol 50 MG tablet    LOPRESSOR    60 tablet    Take 1 tablet (50 mg) by mouth 2 times daily       order for DME      Equipment ordered: RESMED CPAP Mask type: Full face  Settings: 15       oxyCODONE 5 MG IR tablet    ROXICODONE    15 tablet    Take 1-2 tablets (5-10 mg) by mouth every 6 hours as needed for moderate to severe pain       potassium chloride SA 10 MEQ CR tablet    K-DUR/KLOR-CON M    180  tablet    Take 2 tablets (20 mEq) by mouth daily       PROPECIA 1 MG tablet   Generic drug:  finasteride      Take 1 mg by mouth daily.       rivaroxaban ANTICOAGULANT 10 MG Tabs tablet    XARELTO     Take 2 tablets (20 mg) by mouth daily       senna 8.6 MG tablet    SENOKOT     Take 8.6 mg by mouth       VITAMIN B 12 PO

## 2017-03-09 NOTE — NURSING NOTE
"Chief Complaint   Patient presents with     Arthritis     Recheck meds.       Initial /86 (BP Location: Left arm, Patient Position: Chair, Cuff Size: Adult Regular)  Pulse 99  Temp 98.3  F (36.8  C) (Oral)  Ht 5' 6\" (1.676 m)  Wt 265 lb 6.4 oz (120.4 kg)  SpO2 96%  BMI 42.84 kg/m2 Estimated body mass index is 42.84 kg/(m^2) as calculated from the following:    Height as of this encounter: 5' 6\" (1.676 m).    Weight as of this encounter: 265 lb 6.4 oz (120.4 kg).  Medication Reconciliation: complete     Kaminibose MA      "

## 2017-03-09 NOTE — PATIENT INSTRUCTIONS
PLAN:                                                      1.  MEDICATIONS:        - Increase furosemide (lasix) to 40 mg daily       - Increase potassium to 2 daily       - Continue other medications without change -->  Wean down on pain medication   2.  Small refill of oxycodone -->  Get further refills from orthopedist  3.  While on xarelto, if treatment for gout is needed, also take omeprazole 20 mg daily (prilosec).  4.  OK short course of indocin as needed, as long as taking the omeprazole  5.  Follow-up in 3-4 months, keep track of gout flares      What are purines and in which foods are they found?  Introduction  Purines are natural substances found in all of the body's cells, and in virtually all foods. The reason for their widespread occurrence is simple: purines provide part of the chemical structure of our genes and the genes of plants and animals. A relatively small number of foods, however, contain concentrated amounts of purines. For the most part, these high-purine foods are also high-protein foods, and they include organ meats like kidney, fish like mackerel, herring, sardines and mussels, and also yeast.   Purines are metabolized into uric acid  When cells die and get recycled, the purines in their genetic material also get broken down. Uric acid is the chemical formed when purines have been broken down completely. It's normal and healthy for uric acid to be formed in the body from breakdown of purines. In our blood, for example, uric acid serves as an antioxidant and helps prevent damage to our blood vessel linings, so a continual supply of uric acid is important for protecting our blood vessels.   Uric acid levels in the blood and other parts of the body can become too high, however, under a variety of circumstances. Since our kidneys are responsible for helping keep blood levels of uric acid balanced, kidney problems can lead to excessive accumulation of uric acid in various parts of the body.  "Excessive breakdown of cells can also cause uric acid build-up. When uric acid accumulates, uric acid crystals (called monosodium urate crystals) can become deposited in our tendons, joints, kidneys, and other organs. This accumulation of uric acid crystals is called gouty arthritis, or simply \"gout.\"   Foods that contain purines  Because uric acid is formed from the breakdown of purines, low-purine diets are often used to help treat conditions like gout in which excessive uric acid is deposited in the tissues of the body. The average daily diet for an adult in the [place] contains approximately 600-1,000 milligrams of purines.   Recent research by Ricci and others has shown that the impact of plant purines on gout risk is very different from the impact of animal purines, and that within the animal food family, purines from meat and fish act very differently than purines from dairy. Ricci's work has demonstrated that purines from meat and fish clearly increase our risk of gout, while purines from vegetables fail to change our risk. Dairy foods (which can contain purines) actually appear to lower our risk of gout. In summary, this epidemiological research (on tens of thousands of men and women) makes it clear that all purine-containing foods are not the same, and that plant purines are far safer than meat and fish purines in terms of gout risk.   In a case of severe or advanced gout, dietitians will often ask individuals to decrease their total daily purine intake to 100-150 milligrams. A 3.5 ounce serving of some foods, all by itself, can contain up to 1,000 milligrams of purines. These foods include anchovies, herring, kidney, liver, mackerel, meat extracts, mincemeat, mussels, sardines, and yeast. You'll notice that only of these foods - liver - is included amongst the World's Healthiest Foods. The table below lists other foods that contain higher-than-usual amounts of purines, though not nearly as much as the foods " described above.   Foods with very high purine levels(up to 1,000 mg per 3.5 ounce serving): Anchovies, Brains, Gravies, Kidneys,Liver, Sardines, Sweetbreads   Foods with high and moderately high purine levels(5-100 mg per 3.5 ounch serving): Asparagus, Castro, Beef, Bluefish, Bouillon, Calf tongue, Carp, Cauliflower, Chicken, Chicken soup, Codfish, Crab, Duck, Goose, Halibut, Ham, Kidney beans, Lamb, Lentils, Lima beans, Lobster, Mushrooms, Mutton, Navy beans, Oatmeal, Oysters, Peas, Perch, Pork, Rabbit, [place], Sheep, Shellfish, Snapper, Spinach, Tripe, Trout, Tuna, Turkey, Veal, Venison

## 2017-03-09 NOTE — PROGRESS NOTES
"  SUBJECTIVE:                                                    Mike Schultz is a 66 year old male who presents to clinic today for the following health issues:      Gout      Duration: years, flare just before surgery last month    Description  Location: Different locations on the R then L foot, sporadic       Intensity:  severe    Accompanying signs and symptoms: Flaring pain    History  Previous similar problem: YES- Seeing a rhematologist.    Previous evaluation:  Yes.Precipitating or alleviating factors:  Trauma or overuse: no     Aggravating factors include: Probably Protein diet    Therapies tried and outcome: Allopurinol.    Patient was given steroids for gout flare, told to avoid indocin due to anticoagulation       Recently had an R knee replacement for arthritis on 2/13/17.  Discharge summary not avaialable ([place]).  Patient will see surgeon in one week, remains on xarelto.   Denies any postop complications, clots, etc.   Currently down to 2 oxycodone 1-2 times daily.  Leg remains quite edematous, but only pain is in knee area    Reviewed and updated as needed this visit by clinical staff:  Medications  Allergies  Soc Hx   Additional history: as documented      ROS:    Constitutional, HEENT, cardiovascular, pulmonary, gi and gu systems are negative, except as otherwise noted.    OBJECTIVE:                                                      /86 (BP Location: Left arm, Patient Position: Chair, Cuff Size: Adult Regular)  Pulse 99  Temp 98.3  F (36.8  C) (Oral)  Ht 5' 6\" (1.676 m)  Wt 265 lb 6.4 oz (120.4 kg)  SpO2 96%  BMI 42.84 kg/m2  Body mass index is 42.84 kg/(m^2).   No apparent distress  Slightly irregular pulse  Good incision R knee, residual staple removed, some pink areas, no discharge, modest warmth  2+ pitting edema to the R knee        ASSESSMENT:                                                      1.  Gout, recent flares, currently inactive.  Good results with indocin.  2.  3 " 1/2 weeks status post total knee arthroplasty, on anticoagulation   3.  Postop edema RLE.   Discussed means to lessen.  4.  Obesity, improved  5.  Atrial fib, stable    PLAN:                                                      1.  MEDICATIONS:        - Increase furosemide (lasix) to 40 mg daily       - Increase potassium to 2 daily       - Continue other medications without change -->  Wean down on pain medication   2.  Small refill of oxycodone -->  Get further refills from orthopedist  3.  While on xarelto, if treatment for gout is needed, also take omeprazole 20 mg daily (prilosec).  4.  OK short course of indocin as needed, as long as taking the omeprazole  5.  Follow-up in 3-4 months, keep track of gout flares    Johnny Kay MD  [facility]

## 2017-05-02 DIAGNOSIS — I48.19 PERSISTENT ATRIAL FIBRILLATION (H): Primary | ICD-10-CM

## 2017-05-02 NOTE — TELEPHONE ENCOUNTER
rivaroxaban ANTICOAGULANT (XARELTO) 10 MG TABS tablet      Last Written Prescription Date:  03/09/2017  Last Fill Quantity: n/a,   # refills: n/a  Last Office Visit with [facility], [facility] or [facility] prescribing provider: 03/09/2017  Future Office visit:    Next 5 appointments (look out 90 days)     Jun 09, 2017  8:30 AM CDT   Office Visit with Johnny Kay MD   [facility] ([facility])    [address] [place] [address] [phone]                   Routing refill request to provider for review/approval because:  Medication is reported/historical

## 2017-05-03 NOTE — TELEPHONE ENCOUNTER
Xarelto was started by patient's orthopedist for postoperative DVT prophylaxis, as well as for persistent atrial fibrillation.    Please verify the patient is not drinking alcohol. Assuming this is true, we'll refill medication.

## 2017-05-05 NOTE — TELEPHONE ENCOUNTER
Pt is taking 20 mg not 10 mg. Rx changed and send to pharmacy.  Spoke w/ pharmacy/pt  Giselle Potts RN

## 2017-06-21 DIAGNOSIS — I48.91 ATRIAL FIBRILLATION, UNSPECIFIED TYPE (H): ICD-10-CM

## 2017-06-21 DIAGNOSIS — I10 ESSENTIAL HYPERTENSION: ICD-10-CM

## 2017-06-21 RX ORDER — METOPROLOL TARTRATE 50 MG
TABLET ORAL
Qty: 60 TABLET | Refills: 8 | Status: SHIPPED | OUTPATIENT
Start: 2017-06-21 | End: 2017-10-12

## 2017-06-21 NOTE — TELEPHONE ENCOUNTER
metoprolol      Last Written Prescription Date: 2/10/17  Last Fill Quantity: 60, # refills: 3    Last Office Visit with [facility], [facility] or [facility] prescribing provider:  3/9/17   Future Office Visit:    Next 5 appointments (look out 90 days)     Jul 20, 2017  9:00 AM CDT   Office Visit with Johnny Kay MD   [facility]    [address] [address] [phone]                    BP Readings from Last 3 Encounters:   03/09/17 130/86   02/25/17 118/78   02/03/17 130/80

## 2017-07-09 DIAGNOSIS — I10 ESSENTIAL HYPERTENSION: ICD-10-CM

## 2017-07-10 NOTE — TELEPHONE ENCOUNTER
amLODIPine (NORVASC) 5 MG tablet      Last Written Prescription Date: 2/3/17  Last Fill Quantity: 0, # refills: 0  Last Office Visit with G, P or [facility] prescribing provider: 3/9/17  Next 5 appointments (look out 90 days)     Jul 20, 2017  9:00 AM CDT   Office Visit with Johnny Kay MD   [facility] ([facility])    [address] [address] [phone]                   Potassium   Date Value Ref Range Status   02/03/2017 4.0 3.4 - 5.3 mmol/L Final     Creatinine   Date Value Ref Range Status   02/03/2017 1.21 0.66 - 1.25 mg/dL Final     BP Readings from Last 3 Encounters:   03/09/17 130/86   02/25/17 118/78   02/03/17 130/80

## 2017-07-11 RX ORDER — AMLODIPINE BESYLATE 5 MG/1
TABLET ORAL
Qty: 90 TABLET | Refills: 2 | Status: SHIPPED | OUTPATIENT
Start: 2017-07-11 | End: 2017-10-12

## 2017-10-02 ENCOUNTER — TELEPHONE (OUTPATIENT)
Dept: INTERNAL MEDICINE | Facility: CLINIC | Age: 67
End: 2017-10-02

## 2017-10-02 NOTE — TELEPHONE ENCOUNTER
Patient would like to be see sooner if possible. Is there any available day/time before 11/09 appointment.

## 2017-10-11 ENCOUNTER — MYC MEDICAL ADVICE (OUTPATIENT)
Dept: INTERNAL MEDICINE | Facility: CLINIC | Age: 67
End: 2017-10-11

## 2017-10-11 DIAGNOSIS — I10 ESSENTIAL HYPERTENSION WITH GOAL BLOOD PRESSURE LESS THAN 140/90: ICD-10-CM

## 2017-10-11 DIAGNOSIS — E78.5 HYPERLIPIDEMIA LDL GOAL <130: ICD-10-CM

## 2017-10-11 DIAGNOSIS — R60.9 FLUID RETENTION: ICD-10-CM

## 2017-10-11 DIAGNOSIS — I10 ESSENTIAL HYPERTENSION: Primary | ICD-10-CM

## 2017-10-11 DIAGNOSIS — R73.01 IMPAIRED FASTING GLUCOSE: ICD-10-CM

## 2017-10-11 RX ORDER — LOSARTAN POTASSIUM 100 MG/1
TABLET ORAL
Qty: 90 TABLET | Refills: 1 | Status: SHIPPED | OUTPATIENT
Start: 2017-10-11 | End: 2018-04-06

## 2017-10-11 NOTE — TELEPHONE ENCOUNTER
losartan (COZAAR) 100 MG tablet      Last Written Prescription Date: 08/19/2016  Last Fill Quantity: 90, # refills: 3    Last Office Visit with [facility], [facility] or [facility] prescribing provider:  03/09/2017   Future Office Visit:    Next 5 appointments (look out 90 days)     Oct 12, 2017  1:00 PM CDT   [facility]    [address] [address] [phone]                    BP Readings from Last 3 Encounters:   03/09/17 130/86   02/25/17 118/78   02/03/17 130/80

## 2017-10-12 ENCOUNTER — OFFICE VISIT (OUTPATIENT)
Dept: INTERNAL MEDICINE | Facility: CLINIC | Age: 67
End: 2017-10-12
Payer: COMMERCIAL

## 2017-10-12 VITALS
HEART RATE: 88 BPM | WEIGHT: 288.5 LBS | SYSTOLIC BLOOD PRESSURE: 148 MMHG | TEMPERATURE: 98.3 F | HEIGHT: 66 IN | DIASTOLIC BLOOD PRESSURE: 102 MMHG | BODY MASS INDEX: 46.36 KG/M2 | OXYGEN SATURATION: 92 %

## 2017-10-12 DIAGNOSIS — Z23 NEED FOR PROPHYLACTIC VACCINATION AND INOCULATION AGAINST INFLUENZA: ICD-10-CM

## 2017-10-12 DIAGNOSIS — I48.91 ATRIAL FIBRILLATION, UNSPECIFIED TYPE (H): ICD-10-CM

## 2017-10-12 DIAGNOSIS — Z23 NEED FOR VACCINATION: ICD-10-CM

## 2017-10-12 DIAGNOSIS — E78.5 HYPERLIPIDEMIA LDL GOAL <130: ICD-10-CM

## 2017-10-12 DIAGNOSIS — I48.19 PERSISTENT ATRIAL FIBRILLATION (H): ICD-10-CM

## 2017-10-12 DIAGNOSIS — I10 ESSENTIAL HYPERTENSION: ICD-10-CM

## 2017-10-12 DIAGNOSIS — R06.00 DYSPNEA, UNSPECIFIED TYPE: ICD-10-CM

## 2017-10-12 DIAGNOSIS — G47.33 OSA (OBSTRUCTIVE SLEEP APNEA): ICD-10-CM

## 2017-10-12 DIAGNOSIS — R60.9 FLUID RETENTION: ICD-10-CM

## 2017-10-12 DIAGNOSIS — E66.01 MORBID OBESITY DUE TO EXCESS CALORIES (H): ICD-10-CM

## 2017-10-12 DIAGNOSIS — R73.01 IMPAIRED FASTING GLUCOSE: ICD-10-CM

## 2017-10-12 DIAGNOSIS — R60.9 DEPENDENT EDEMA: Primary | ICD-10-CM

## 2017-10-12 LAB
ALBUMIN SERPL-MCNC: 3.4 G/DL (ref 3.4–5)
ALP SERPL-CCNC: 83 U/L (ref 40–150)
ALT SERPL W P-5'-P-CCNC: 40 U/L (ref 0–70)
ANION GAP SERPL CALCULATED.3IONS-SCNC: 7 MMOL/L (ref 3–14)
AST SERPL W P-5'-P-CCNC: 23 U/L (ref 0–45)
BILIRUB SERPL-MCNC: 1 MG/DL (ref 0.2–1.3)
BUN SERPL-MCNC: 22 MG/DL (ref 7–30)
CALCIUM SERPL-MCNC: 8.7 MG/DL (ref 8.5–10.1)
CHLORIDE SERPL-SCNC: 103 MMOL/L (ref 94–109)
CHOLEST SERPL-MCNC: 178 MG/DL
CO2 SERPL-SCNC: 31 MMOL/L (ref 20–32)
CREAT SERPL-MCNC: 1.1 MG/DL (ref 0.66–1.25)
GFR SERPL CREATININE-BSD FRML MDRD: 67 ML/MIN/1.7M2
GLUCOSE SERPL-MCNC: 136 MG/DL (ref 70–99)
HBA1C MFR BLD: 5.7 % (ref 4.3–6)
HDLC SERPL-MCNC: 50 MG/DL
LDLC SERPL CALC-MCNC: 90 MG/DL
NONHDLC SERPL-MCNC: 128 MG/DL
POTASSIUM SERPL-SCNC: 3.7 MMOL/L (ref 3.4–5.3)
PROT SERPL-MCNC: 6.9 G/DL (ref 6.8–8.8)
SODIUM SERPL-SCNC: 141 MMOL/L (ref 133–144)
TRIGL SERPL-MCNC: 190 MG/DL
TSH SERPL DL<=0.005 MIU/L-ACNC: 3.8 MU/L (ref 0.4–4)

## 2017-10-12 PROCEDURE — 84443 ASSAY THYROID STIM HORMONE: CPT | Performed by: INTERNAL MEDICINE

## 2017-10-12 PROCEDURE — 83036 HEMOGLOBIN GLYCOSYLATED A1C: CPT | Performed by: INTERNAL MEDICINE

## 2017-10-12 PROCEDURE — G0008 ADMIN INFLUENZA VIRUS VAC: HCPCS | Mod: 59 | Performed by: INTERNAL MEDICINE

## 2017-10-12 PROCEDURE — 36415 COLL VENOUS BLD VENIPUNCTURE: CPT | Performed by: INTERNAL MEDICINE

## 2017-10-12 PROCEDURE — 80061 LIPID PANEL: CPT | Performed by: INTERNAL MEDICINE

## 2017-10-12 PROCEDURE — 93225 XTRNL ECG REC<48 HRS REC: CPT | Performed by: INTERNAL MEDICINE

## 2017-10-12 PROCEDURE — 80053 COMPREHEN METABOLIC PANEL: CPT | Performed by: INTERNAL MEDICINE

## 2017-10-12 PROCEDURE — 90472 IMMUNIZATION ADMIN EACH ADD: CPT | Performed by: INTERNAL MEDICINE

## 2017-10-12 PROCEDURE — 99215 OFFICE O/P EST HI 40 MIN: CPT | Mod: 25 | Performed by: INTERNAL MEDICINE

## 2017-10-12 PROCEDURE — 93226 XTRNL ECG REC<48 HR SCAN A/R: CPT | Performed by: INTERNAL MEDICINE

## 2017-10-12 PROCEDURE — 90662 IIV NO PRSV INCREASED AG IM: CPT | Performed by: INTERNAL MEDICINE

## 2017-10-12 PROCEDURE — 90715 TDAP VACCINE 7 YRS/> IM: CPT | Performed by: INTERNAL MEDICINE

## 2017-10-12 RX ORDER — INDOMETHACIN 50 MG/1
50 CAPSULE ORAL 3 TIMES DAILY PRN
Qty: 30 CAPSULE | Refills: 1 | Status: CANCELLED | OUTPATIENT
Start: 2017-10-12

## 2017-10-12 RX ORDER — METOPROLOL TARTRATE 50 MG
100 TABLET ORAL 2 TIMES DAILY
Status: ON HOLD
Start: 2017-10-12 | End: 2017-11-07

## 2017-10-12 NOTE — MR AVS SNAPSHOT
After Visit Summary   10/12/2017    Mike Schultz    MRN: [id number]           Patient Information     Date Of Birth          1950        Visit Information        Provider Department      10/12/2017 1:00 PM Johnny Kay MD [facility]        Today's Diagnoses     Dependent edema    -  1    Persistent atrial fibrillation (H)        Essential hypertension        Morbid obesity        Hyperlipidemia LDL goal <130        BOWEN (obstructive sleep apnea)        Impaired fasting glucose        Need for vaccination        Need for prophylactic vaccination and inoculation against influenza        Dyspnea, unspecified type        Atrial fibrillation, unspecified type (H)          Care Instructions    PLAN:                                                      1.  MEDICATIONS:        - Increase furosemide to 40 mg daily, for now       - Increase metoprolol to 100 mg twice daily        - Stop the amlodipine, for now       - Continue other medications without change  2.  Schedule follow-up with cardiology.   Consider seeing Dr. Day.  3.  Complete Holter monitor before cardiology appointment   4.  Schedule follow-up with sleep specialist.   Consider nocturnal oxygen level check, to be sure that oxygen level isn't falling since weight gain.  5.  Recheck basic metabolic panel in 2 weeks, fasting.  6.  Repeat echocardiogram          Follow-ups after your visit        Future tests that were ordered for you today     Open Future Orders        Priority Expected Expires Ordered    XR Chest 2 Views Routine 10/12/2017 10/12/2018 10/12/2017    Echocardiogram Routine  10/12/2018 10/12/2017    Basic metabolic panel Routine 10/12/2017 10/12/2018 10/12/2017            Who to contact     If you have questions or need follow up information about today's clinic visit or your schedule please contact [facility] directly at [phone].  Normal or non-critical lab and  "imaging results will be communicated to you by MyChart, letter or phone within 4 business days after the clinic has received the results. If you do not hear from us within 7 days, please contact the clinic through [employer] or phone. If you have a critical or abnormal lab result, we will notify you by phone as soon as possible.  Submit refill requests through [employer] or call your pharmacy and they will forward the refill request to us. Please allow 3 business days for your refill to be completed.          Additional Information About Your Visit        [employer] Information     [employer] gives you secure access to your electronic health record. If you see a primary care provider, you can also send messages to your care team and make appointments. If you have questions, please call your primary care clinic.  If you do not have a primary care provider, please call [phone] and they will assist you.        Care EveryWhere ID     This is your Care EveryWhere ID. This could be used by other organizations to access your [place] medical records  [id number]        Your Vitals Were     Pulse Temperature Height Pulse Oximetry BMI (Body Mass Index)       88 98.3  F (36.8  C) (Oral) 5' 6\" (1.676 m) 92% 46.57 kg/m2        Blood Pressure from Last 3 Encounters:   10/12/17 (!) 148/102   03/09/17 130/86   02/25/17 118/78    Weight from Last 3 Encounters:   10/12/17 288 lb 8 oz (130.9 kg)   03/09/17 265 lb 6.4 oz (120.4 kg)   02/25/17 260 lb 14.4 oz (118.3 kg)              We Performed the Following     ADMIN INFLUENZA (For MEDICARE Patients ONLY) []     FLU VACCINE, INCREASED ANTIGEN, PRESV FREE, AGE 65+ [39346]     TDAP VACCINE (ADACEL) [88705.002]     Vaccine Administration, Initial [33265]          Today's Medication Changes          These changes are accurate as of: 10/12/17  2:15 PM.  If you have any questions, ask your nurse or doctor.               These medicines have changed or have updated prescriptions.        " Dose/Directions    metoprolol 50 MG tablet   Commonly known as:  LOPRESSOR   This may have changed:  See the new instructions.   Used for:  Essential hypertension, Atrial fibrillation, unspecified type (H)   Changed by:  Johnny Kay MD        Dose:  100 mg   Take 2 tablets (100 mg) by mouth 2 times daily   Refills:  0            Where to get your medicines      Some of these will need a paper prescription and others can be bought over the counter.  Ask your nurse if you have questions.     You don't need a prescription for these medications     metoprolol 50 MG tablet                Primary Care Provider Office Phone # Fax #    Johnny Kay [phone]       [address] [place] [id number]        Equal Access to Services     [facility] : Hadii radha trejo hadasho Soomaali, waaxda luqadaha, qaybta kaalmada adeegyada, waxay robbyin hayobi choudhury . So [facility] [phone].    ATENCIÓN: Si habla español, tiene a hightower disposición servicios gratuitos de asistencia lingüística. [facility] [phone].    We comply with applicable federal civil rights laws and [place] laws. We do not discriminate on the basis of race, color, national origin, age, disability, sex, sexual orientation, or gender identity.            Thank you!     Thank you for choosing [facility]  for your care. Our goal is always to provide you with excellent care. Hearing back from our patients is one way we can continue to improve our services. Please take a few minutes to complete the written survey that you may receive in the mail after your visit with us. Thank you!             Your Updated Medication List - Protect others around you: Learn how to safely use, store and throw away your medicines at www.disposemymeds.org.          This list is accurate as of: 10/12/17  2:15 PM.  Always use your most recent med list.                   Brand Name Dispense Instructions for use Diagnosis     acetaminophen 325 MG tablet    TYLENOL     Take 650 mg by mouth        allopurinol 300 MG tablet    ZYLOPRIM      Essential hypertension with goal blood pressure less than 140/90       ASPIRIN PO      Take 81 mg by mouth    Persistent atrial fibrillation (H)       furosemide 20 MG tablet    LASIX    180 tablet    Take 2 tablets (40 mg) by mouth daily    Peripheral edema       indomethacin 50 MG capsule    INDOCIN    30 capsule    Take 1 capsule (50 mg) by mouth 3 times daily as needed for moderate pain with food    Gout of foot, unspecified cause, unspecified chronicity, unspecified laterality       losartan 100 MG tablet    COZAAR    90 tablet    Take 1 tablet by mouth daily    Essential hypertension with goal blood pressure less than 140/90       metoprolol 50 MG tablet    LOPRESSOR     Take 2 tablets (100 mg) by mouth 2 times daily    Essential hypertension, Atrial fibrillation, unspecified type (H)       order for DME      Equipment ordered: RESMED CPAP Mask type: Full face  Settings: 15        potassium chloride SA 10 MEQ CR tablet    K-DUR/KLOR-CON M    180 tablet    Take 2 tablets (20 mEq) by mouth daily    Peripheral edema       PROPECIA 1 MG tablet   Generic drug:  finasteride      Take 1 mg by mouth daily.        rivaroxaban ANTICOAGULANT 20 MG Tabs tablet    XARELTO    90 tablet    Take 1 tablet (20 mg) by mouth daily (with dinner) AVIOD USE OF ALCOHOL WHILE USING MEDICATION    Persistent atrial fibrillation (H)       senna 8.6 MG tablet    SENOKOT     Take 8.6 mg by mouth        VITAMIN B 12 PO
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
## 2017-10-12 NOTE — NURSING NOTE
"Chief Complaint   Patient presents with     Hypertension     Lipids     Glucose       Initial BP (!) 148/102  Pulse 88  Temp 98.3  F (36.8  C) (Oral)  Ht 5' 6\" (1.676 m)  Wt 288 lb 8 oz (130.9 kg)  SpO2 92%  BMI 46.57 kg/m2 Estimated body mass index is 46.57 kg/(m^2) as calculated from the following:    Height as of this encounter: 5' 6\" (1.676 m).    Weight as of this encounter: 288 lb 8 oz (130.9 kg).  Medication Reconciliation: complete   Oneyda Garcia MA   "

## 2017-10-12 NOTE — PATIENT INSTRUCTIONS
PLAN:                                                      1.  MEDICATIONS:        - Increase furosemide to 40 mg daily, for now       - Increase metoprolol to 100 mg twice daily        - Stop the amlodipine, for now       - Continue other medications without change  2.  Schedule follow-up with cardiology.   Consider seeing Dr. Day.  3.  Complete Holter monitor before cardiology appointment   4.  Schedule follow-up with sleep specialist.   Consider nocturnal oxygen level check, to be sure that oxygen level isn't falling since weight gain.  5.  Recheck basic metabolic panel in 2 weeks, fasting.  6.  Repeat echocardiogram

## 2017-10-16 ENCOUNTER — MYC MEDICAL ADVICE (OUTPATIENT)
Dept: INTERNAL MEDICINE | Facility: CLINIC | Age: 67
End: 2017-10-16

## 2017-10-17 ENCOUNTER — MYC MEDICAL ADVICE (OUTPATIENT)
Dept: INTERNAL MEDICINE | Facility: CLINIC | Age: 67
End: 2017-10-17

## 2017-10-18 ENCOUNTER — MYC MEDICAL ADVICE (OUTPATIENT)
Dept: INTERNAL MEDICINE | Facility: CLINIC | Age: 67
End: 2017-10-18

## 2017-10-18 ENCOUNTER — RADIANT APPOINTMENT (OUTPATIENT)
Dept: CARDIOLOGY | Facility: CLINIC | Age: 67
End: 2017-10-18
Attending: INTERNAL MEDICINE
Payer: COMMERCIAL

## 2017-10-18 DIAGNOSIS — I48.19 PERSISTENT ATRIAL FIBRILLATION (H): ICD-10-CM

## 2017-10-18 PROBLEM — I35.0 NONRHEUMATIC AORTIC VALVE STENOSIS: Status: ACTIVE | Noted: 2017-10-18

## 2017-10-18 PROCEDURE — 93306 TTE W/DOPPLER COMPLETE: CPT | Mod: 26 | Performed by: INTERNAL MEDICINE

## 2017-10-18 PROCEDURE — 93306 TTE W/DOPPLER COMPLETE: CPT | Mod: TC

## 2017-10-18 RX ADMIN — Medication 6 ML: at 08:39

## 2017-10-20 NOTE — TELEPHONE ENCOUNTER
Holter showed persistent atrial fib, with PVCs, no other rhythm problems.    Pulse ranged from 52 to 146, with an average of 84.   Max pulserate each hour > 100.   Hourly average only > 100 at 8 am (114).    Patient has cardiology follow-up on 10/27.

## 2017-10-26 DIAGNOSIS — R60.9 DEPENDENT EDEMA: ICD-10-CM

## 2017-10-26 DIAGNOSIS — I10 ESSENTIAL HYPERTENSION: ICD-10-CM

## 2017-10-26 LAB
ANION GAP SERPL CALCULATED.3IONS-SCNC: 8 MMOL/L (ref 3–14)
BUN SERPL-MCNC: 29 MG/DL (ref 7–30)
CALCIUM SERPL-MCNC: 8.7 MG/DL (ref 8.5–10.1)
CHLORIDE SERPL-SCNC: 104 MMOL/L (ref 94–109)
CO2 SERPL-SCNC: 30 MMOL/L (ref 20–32)
CREAT SERPL-MCNC: 1.35 MG/DL (ref 0.66–1.25)
GFR SERPL CREATININE-BSD FRML MDRD: 53 ML/MIN/1.7M2
GLUCOSE SERPL-MCNC: 113 MG/DL (ref 70–99)
POTASSIUM SERPL-SCNC: 4.2 MMOL/L (ref 3.4–5.3)
SODIUM SERPL-SCNC: 142 MMOL/L (ref 133–144)

## 2017-10-26 PROCEDURE — 36415 COLL VENOUS BLD VENIPUNCTURE: CPT | Performed by: INTERNAL MEDICINE

## 2017-10-26 PROCEDURE — 80048 BASIC METABOLIC PNL TOTAL CA: CPT | Performed by: INTERNAL MEDICINE

## 2017-10-27 ENCOUNTER — OFFICE VISIT (OUTPATIENT)
Dept: CARDIOLOGY | Facility: CLINIC | Age: 67
End: 2017-10-27
Attending: INTERNAL MEDICINE
Payer: COMMERCIAL

## 2017-10-27 VITALS
WEIGHT: 288 LBS | SYSTOLIC BLOOD PRESSURE: 130 MMHG | BODY MASS INDEX: 46.28 KG/M2 | HEART RATE: 72 BPM | HEIGHT: 66 IN | DIASTOLIC BLOOD PRESSURE: 80 MMHG

## 2017-10-27 DIAGNOSIS — I48.91 ATRIAL FIBRILLATION, UNSPECIFIED TYPE (H): ICD-10-CM

## 2017-10-27 DIAGNOSIS — I10 BENIGN ESSENTIAL HYPERTENSION: ICD-10-CM

## 2017-10-27 PROCEDURE — 99215 OFFICE O/P EST HI 40 MIN: CPT | Performed by: INTERNAL MEDICINE

## 2017-10-27 NOTE — LETTER
2017             Johnny Kay MD   [address] [address]      RE: Mike Schultz    MRN: [id number]   : 1950      Dear Dr. Kay:       Thank you for allowing me to participate in the care of this very delightful patient.  As you know, Mr. Schultz is a 67-year-old gentleman with a history of recurrent persistent atrial fibrillation in the background of hypertension, obesity and sleep apnea.  He has been followed by my partner, Dr. Yates, since .  As you may recall, at that time he was noted to be in atrial fibrillation during a preoperative evaluation for his hernia repair.  The patient subsequently underwent a cardioversion and was put on flecainide at 150 mg twice a day and was doing well for the most part until this past December when he was noted to be in persistent atrial fibrillation.  The patient did not have classical symptoms with the atrial fibrillation, but just felt more fatigued and less energy in general.  Dr. Yates decided to rate control the patient by stopping the flecainide and continuing metoprolol along with Xarelto, especially given the fact that the patient was drinking alcohol quite heavily at that time, up to 1/2 pint a day.      Since then, he has cut down his alcohol intake to perhaps a quart or a pint every couple of days now, but he has been retaining a lot of fluid.  You had been escalating the dose of his Lasix with some response.  His most recent renal function showed the creatinine went up from 1.1-1.35 on a high dose of diuretic.  The patient did have Holter monitoring that was done as well, which demonstrated that he was in atrial fibrillation with an average of 84, minimum of 52 and maximum of 46.      Mr. Schultz has been taking Xarelto every other day, as he is taking Indocinfor his gout.  Examination is consistent for atrial fibrillation with a controlled ventricular response.  There is pitting edema up to  his knees bilaterally.  His abdomen is quite obese, but I do not detect any JVD.  His lungs are clear.      Given that the rate is well-controlled atrial fibrillation, yet the patient continues to retain fluid, likely this is a reflection of the loss of AV synchrony from persistent atrial fibrillation for the past couple of years now and likely diastolic dysfunction on top of venous stasis.  He is known to have moderate aortic stenosis, but not to the degree that it would contribute to his current symptomatology.      I would agree that restoring and maintaining sinus rhythm should be a consideration now, especially given the fact that the patient is willing to abstain from alcohol.  I asked him to take Xarelto on a daily basis and stop the Indocin.  I would like to obtain a cardiac MRI to look at his pulmonary veins in light of his mild renal insufficiency.  A heart CT with contrast may make it worse.  I went over the procedure in detail with the risks including, but not limited to, vascular injury, cardiac perforation and CVA.  The cardiac MRI can help me look at the left atrial appendage as well to make sure there are no clots in there prior to the procedure.  I also warned the patient that we may need to do a second procedure given the persistent nature of his atrial fibrillation.  Afterward, he will continue with Xarelto for at least a couple months along with probably the addition of amiodarone to reduce the early recurrence of atrial fibrillation post ablation.      Sincerely,      Fady Day MD

## 2017-10-27 NOTE — PROGRESS NOTES
HPI and Plan:   See dictation  [id number]  Orders Placed This Encounter   Procedures     MRI Cardiac w/contrast     EP Ablation Procedures       No orders of the defined types were placed in this encounter.      There are no discontinued medications.      Encounter Diagnoses   Name Primary?     Atrial fibrillation, unspecified type (H)      Benign essential hypertension        CURRENT MEDICATIONS:  Current Outpatient Prescriptions   Medication Sig Dispense Refill     metoprolol (LOPRESSOR) 50 MG tablet Take 2 tablets (100 mg) by mouth 2 times daily       losartan (COZAAR) 100 MG tablet Take 1 tablet by mouth daily 90 tablet 1     rivaroxaban ANTICOAGULANT (XARELTO) 20 MG TABS tablet Take 1 tablet (20 mg) by mouth daily (with dinner) AVIOD USE OF ALCOHOL WHILE USING MEDICATION 90 tablet PRN     furosemide (LASIX) 20 MG tablet Take 2 tablets (40 mg) by mouth daily 180 tablet prn     potassium chloride SA (K-DUR/KLOR-CON M) 10 MEQ CR tablet Take 2 tablets (20 mEq) by mouth daily 180 tablet prn     acetaminophen (TYLENOL) 325 MG tablet Take 650 mg by mouth       senna (SENOKOT) 8.6 MG tablet Take 8.6 mg by mouth       ASPIRIN PO Take 81 mg by mouth       Cyanocobalamin (VITAMIN B 12 PO)        order for DME Equipment ordered: RESMED CPAP Mask type: Full face  Settings: 15       indomethacin (INDOCIN) 50 MG capsule Take 1 capsule (50 mg) by mouth 3 times daily as needed for moderate pain with food 30 capsule 1     allopurinol (ZYLOPRIM) 300 MG tablet        finasteride (PROPECIA) 1 MG tablet Take 1 mg by mouth daily.         ALLERGIES     Allergies   Allergen Reactions     No Known Drug Allergies        PAST MEDICAL HISTORY:  Past Medical History:   Diagnosis Date     Atrial fibrillation (H)     persistent     Essential hypertension, benign      H/O ETOH abuse      Herpes simplex without mention of complication      Hyperlipidemia      Obesity      Tobacco use disorder     dced 6/2004       PAST SURGICAL HISTORY:  Past  "Surgical History:   Procedure Laterality Date     C NONSPECIFIC PROCEDURE      achilles tendon     C NONSPECIFIC PROCEDURE      knees, arthroscopy     C NONSPECIFIC PROCEDURE      basal cell skin ca, nose     C NONSPECIFIC PROCEDURE      flex sig; colonoscopy due 3/2008     C NONSPECIFIC PROCEDURE      cardioversion for atr fib     HERNIORRHAPHY UMBILICAL  2012    Procedure: HERNIORRHAPHY UMBILICAL;  UMBILICAL HERNIA REPAIR;  Surgeon: Ra Crocker MD;  Location: [facility]     ORTHOPEDIC SURGERY         FAMILY HISTORY:  Family History   Problem Relation Age of Onset     Family History Negative Mother      HEART DISEASE Father      decesased at 42 - [place]     HEART DISEASE Sister       [place]     HEART DISEASE Brother       MI     HEART DISEASE Brother      CABG AT 49     Lipids Sister      Lipids Sister      Lipids Sister      Family History Negative Brother        SOCIAL HISTORY:  Social History     Social History     Marital status: Single     Spouse name: N/A     Number of children: N/A     Years of education: N/A     Social History Main Topics     Smoking status: Former Smoker     Packs/day: 1.00     Years: 10.00     Types: Cigarettes     Quit date: 2011     Smokeless tobacco: Never Used     Alcohol use Yes      Comment: 4-5 shots nightly vodka     Drug use: No     Sexual activity: Yes     Partners: Female     Other Topics Concern     None     Social History Narrative       Review of Systems:  Skin:  Negative       Eyes:  Positive for glasses (reading only)    ENT:  Negative      Respiratory:  Positive for dyspnea on exertion     Cardiovascular:  Negative for;chest pain Positive for;lightheadedness;edema sweaty  Gastroenterology: Negative      Genitourinary:  Negative      Musculoskeletal:  Negative      Neurologic:  Negative      Psychiatric:  Negative      Heme/Lymph/Imm:  Negative      Endocrine:  Negative        Physical Exam:  Vitals: /80  Pulse 72  Ht 1.676 m (5' 6\")  " Wt 130.6 kg (288 lb)  BMI 46.48 kg/m2    Constitutional:  cooperative, alert and oriented, well developed, well nourished, in no acute distress obese      Skin:  warm and dry to the touch, no apparent skin lesions or masses noted        Head:  normocephalic, no masses or lesions        Eyes:  pupils equal and round, conjunctivae and lids unremarkable, sclera white, no xanthalasma, EOMS intact, no nystagmus        ENT:  no pallor or cyanosis, dentition good        Neck:  carotid pulses are full and equal bilaterally, JVP normal, no carotid bruit, no thyromegaly        Chest:  normal breath sounds, clear to auscultation, normal A-P diameter, normal symmetry, normal respiratory excursion, no use of accessory muscles          Cardiac:   irregularly irregular rhythm   no presence of murmur            Abdomen:  abdomen soft, non-tender, BS normoactive, no mass, no HSM, no bruits obese      Vascular: pulses full and equal, no bruits auscultated                                        Extremities and Back:  no deformities, clubbing, cyanosis, erythema observed   bilateral LE edema;3+;pitting          Neurological:  affect appropriate, oriented to time, person and place              CC  Mary Yates MD  [address]  [id number]  [place] [id number]

## 2017-10-27 NOTE — PROGRESS NOTES
2017             Johnny Kay MD   [address] [address]      RE: Mike Schultz    MRN: [id number]   : 1950      Dear Dr. Kay:       Thank you for allowing me to participate in the care of this very delightful patient.  As you know, Mr. Schultz is a 67-year-old gentleman with a history of recurrent persistent atrial fibrillation in the background of hypertension, obesity and sleep apnea.  He has been followed by my partner, Dr. Yates, since .  As you may recall, at that time he was noted to be in atrial fibrillation during a preoperative evaluation for his hernia repair.  The patient subsequently underwent a cardioversion and was put on flecainide at 150 mg twice a day and was doing well for the most part until this past December when he was noted to be in persistent atrial fibrillation.  The patient did not have classical symptoms with the atrial fibrillation, but just felt more fatigued and less energy in general.  Dr. Yates decided to rate control the patient by stopping the flecainide and continuing metoprolol along with Xarelto, especially given the fact that the patient was drinking alcohol quite heavily at that time, up to 1/2 pint a day.      Since then, he has cut down his alcohol intake to perhaps a quart or a pint every couple of days now, but he has been retaining a lot of fluid.  You had been escalating the dose of his Lasix with some response.  His most recent renal function showed the creatinine went up from 1.1-1.35 on a high dose of diuretic.  The patient did have Holter monitoring that was done as well, which demonstrated that he was in atrial fibrillation with an average of 84, minimum of 52 and maximum of 46.      Mr. Schultz has been taking Xarelto every other day, as he is taking Indocinfor his gout.  Examination is consistent for atrial fibrillation with a controlled ventricular response.  There is pitting edema up to  his knees bilaterally.  His abdomen is quite obese, but I do not detect any JVD.  His lungs are clear.      Given that the rate is well-controlled atrial fibrillation, yet the patient continues to retain fluid, likely this is a reflection of the loss of AV synchrony from persistent atrial fibrillation for the past couple of years now and likely diastolic dysfunction on top of venous stasis.  He is known to have moderate aortic stenosis, but not to the degree that it would contribute to his current symptomatology.      I would agree that restoring and maintaining sinus rhythm should be a consideration now, especially given the fact that the patient is willing to abstain from alcohol.  I asked him to take Xarelto on a daily basis and stop the Indocin.  I would like to obtain a cardiac MRI to look at his pulmonary veins in light of his mild renal insufficiency.  A heart CT with contrast may make it worse.  I went over the procedure in detail with the risks including, but not limited to, vascular injury, cardiac perforation and CVA.  The cardiac MRI can help me look at the left atrial appendage as well to make sure there are no clots in there prior to the procedure.  I also warned the patient that we may need to do a second procedure given the persistent nature of his atrial fibrillation.  Afterward, he will continue with Xarelto for at least a couple months along with probably the addition of amiodarone to reduce the early recurrence of atrial fibrillation post ablation.      Sincerely,      MD KIKE Chandler MD             D: 10/27/2017 09:08   T: 10/27/2017 12:21   MT: yaakov      Name:     TOO HOWARD   MRN:      [id number]        Account:      [id number]   :      1950           Service Date: 10/27/2017      Document: [id number]

## 2017-10-27 NOTE — MR AVS SNAPSHOT
After Visit Summary   10/27/2017    Mike Schultz    MRN: [id number]           Patient Information     Date Of Birth          1950        Visit Information        Provider Department      10/27/2017 8:15 AM Fady Day MD [facility] [place]        Today's Diagnoses     Atrial fibrillation, unspecified type (H)        Benign essential hypertension           Follow-ups after your visit        Your next 10 appointments already scheduled     Oct 31, 2017  8:00 AM CDT   MR MYOCARDIUM W CONTRAST with SCIMR1   [facility] (Cardiovascular Imaging at [facility])    [address] [place] [address]  [address]  [facility] [id number] [phone]           Take your medicines as usual, unless your doctor tells you not to. Bring a list of your current medicines to your exam (including vitamins, minerals and over-the-counter drugs).  You will be given intravenous contrast for this exam. To prepare:   The day before your exam, drink extra fluids at least six 8-ounce glasses (unless your doctor tells you to restrict your fluids).   Have a blood test (creatinine test) within 30 days of your exam. Go to your clinic or Diagnostic Imaging Department for this test.  The MRI machine uses a strong magnet. Please wear clothes without metal (snaps, zippers). A sweatsuit works well, or we may give you a hospital gown.  Please remove any body piercings and hair extensions before you arrive. You will also remove watches, jewelry, hairpins, wallets, dentures, partial dental plates and hearing aids. You may wear contact lenses, and you may be able to wear your rings. We have a safe place to keep your personal items, but it is safer to leave them at home.   **IMPORTANT** THE INSTRUCTIONS BELOW ARE ONLY FOR THOSE PATIENTS WHO HAVE BEEN TOLD THEY WILL RECEIVE SEDATION OR GENERAL ANESTHESIA DURING THEIR MRI PROCEDURE:  IF YOU WILL RECEIVE SEDATION (take medicine to  help you relax during your exam):   You must get the medicine from your doctor before you arrive. Bring the medicine to the exam. Do not take it at home.   Arrive one hour early. Bring someone who can take you home after the test. Your medicine will make you sleepy. After the exam, you may not drive, take a bus or take a taxi by yourself.   No eating 8 hours before your exam. You may have clear liquids up until 4 hours before your exam. (Clear liquids include water, clear tea, black coffee and fruit juice without pulp.)  IF YOU WILL RECEIVE ANESTHESIA (be asleep for your exam):   Arrive 1 1/2 hours early. Bring someone who can take you home after the test. You may not drive, take a bus or take a taxi by yourself.   No eating 8 hours before your exam. You may have clear liquids up until 4 hours before your exam. (Clear liquids include water, clear tea, black coffee and fruit juice without pulp.)  Please call the Imaging Department at your exam site with any questions.            Nov 06, 2017  1:00 PM CST   Ep 90 Minute with [id number]   [facility] ([facility])    [address]  [place] [address]   [phone]            Nov 13, 2017  8:00 AM CST   [facility] EP RETURN with Natalie Campos PA-C   [facility] [place] ([facility])    [address]  [place] [address]   [phone]              Future tests that were ordered for you today     Open Future Orders        Priority Expected Expires Ordered    EP Ablation Procedures Routine 11/3/2017 10/27/2018 10/27/2017    MRI Cardiac w/contrast Routine 10/28/2017 10/27/2018 10/27/2017            Who to contact     If you have questions or need follow up information about today's clinic visit or your schedule please contact [facility] [place] directly at [phone].  Normal or non-critical lab and imaging results will be  "communicated to you by MyChart, letter or phone within 4 business days after the clinic has received the results. If you do not hear from us within 7 days, please contact the clinic through X[employer] or phone. If you have a critical or abnormal lab result, we will notify you by phone as soon as possible.  Submit refill requests through [employer] or call your pharmacy and they will forward the refill request to us. Please allow 3 business days for your refill to be completed.          Additional Information About Your Visit        Xetal Information     Xetal gives you secure access to your electronic health record. If you see a primary care provider, you can also send messages to your care team and make appointments. If you have questions, please call your primary care clinic.  If you do not have a primary care provider, please call [phone] and they will assist you.        Care EveryWhere ID     This is your Care EveryWhere ID. This could be used by other organizations to access your [facility] medical records  [id number]        Your Vitals Were     Pulse Height BMI (Body Mass Index)             72 1.676 m (5' 6\") 46.48 kg/m2          Blood Pressure from Last 3 Encounters:   10/27/17 130/80   10/12/17 (!) 148/102   03/09/17 130/86    Weight from Last 3 Encounters:   10/27/17 130.6 kg (288 lb)   10/12/17 130.9 kg (288 lb 8 oz)   03/09/17 120.4 kg (265 lb 6.4 oz)              We Performed the Following     Follow-Up with Cardiac Advanced Practice Provider        Primary Care Provider Office Phone # Fax #    Johnnyraymundo Kay [phone]       [address] [place] [phone]        Equal Access to Services     [facility] : Hadii aad ku hadasho Soomaali, waaxda luqadaha, qaybta kaalmada adeegyada, kaykay choudhury . So [facility] [phone].    ATENCIÓN: Si habla español, tiene a hightower disposición servicios gratuitos de asistencia lingüística. Llame al [phone].    We " comply with applicable federal civil rights laws and [place] laws. We do not discriminate on the basis of race, color, national origin, age, disability, sex, sexual orientation, or gender identity.            Thank you!     Thank you for choosing [facility] [place]  for your care. Our goal is always to provide you with excellent care. Hearing back from our patients is one way we can continue to improve our services. Please take a few minutes to complete the written survey that you may receive in the mail after your visit with us. Thank you!             Your Updated Medication List - Protect others around you: Learn how to safely use, store and throw away your medicines at www.disposemymeds.org.          This list is accurate as of: 10/27/17  8:50 AM.  Always use your most recent med list.                   Brand Name Dispense Instructions for use Diagnosis    acetaminophen 325 MG tablet    TYLENOL     Take 650 mg by mouth        allopurinol 300 MG tablet    ZYLOPRIM      Essential hypertension with goal blood pressure less than 140/90       ASPIRIN PO      Take 81 mg by mouth    Persistent atrial fibrillation (H)       furosemide 20 MG tablet    LASIX    180 tablet    Take 2 tablets (40 mg) by mouth daily    Peripheral edema       indomethacin 50 MG capsule    INDOCIN    30 capsule    Take 1 capsule (50 mg) by mouth 3 times daily as needed for moderate pain with food    Gout of foot, unspecified cause, unspecified chronicity, unspecified laterality       losartan 100 MG tablet    COZAAR    90 tablet    Take 1 tablet by mouth daily    Essential hypertension with goal blood pressure less than 140/90       metoprolol 50 MG tablet    LOPRESSOR     Take 2 tablets (100 mg) by mouth 2 times daily    Essential hypertension, Atrial fibrillation, unspecified type (H)       order for DME      Equipment ordered: RESMED CPAP Mask type: Full face  Settings: 15        potassium chloride SA 10  MEQ CR tablet    K-DUR/KLOR-CON M    180 tablet    Take 2 tablets (20 mEq) by mouth daily    Peripheral edema       PROPECIA 1 MG tablet   Generic drug:  finasteride      Take 1 mg by mouth daily.        rivaroxaban ANTICOAGULANT 20 MG Tabs tablet    XARELTO    90 tablet    Take 1 tablet (20 mg) by mouth daily (with dinner) AVIOD USE OF ALCOHOL WHILE USING MEDICATION    Persistent atrial fibrillation (H)       senna 8.6 MG tablet    SENOKOT     Take 8.6 mg by mouth        VITAMIN B 12 PO

## 2017-10-31 ENCOUNTER — HOSPITAL ENCOUNTER (OUTPATIENT)
Dept: CARDIOLOGY | Facility: CLINIC | Age: 67
Discharge: HOME | End: 2017-10-31
Attending: INTERNAL MEDICINE | Admitting: INTERNAL MEDICINE
Payer: MEDICARE

## 2017-10-31 DIAGNOSIS — I48.91 ATRIAL FIBRILLATION, UNSPECIFIED TYPE (H): ICD-10-CM

## 2017-10-31 PROCEDURE — 75561 CARDIAC MRI FOR MORPH W/DYE: CPT

## 2017-10-31 PROCEDURE — 75561 CARDIAC MRI FOR MORPH W/DYE: CPT | Mod: 26 | Performed by: INTERNAL MEDICINE

## 2017-10-31 PROCEDURE — 25000128 H RX IP 250 OP 636: Performed by: INTERNAL MEDICINE

## 2017-10-31 PROCEDURE — 71555 MRI ANGIO CHEST W OR W/O DYE: CPT | Mod: 26 | Performed by: INTERNAL MEDICINE

## 2017-10-31 PROCEDURE — A9585 GADOBUTROL INJECTION: HCPCS | Performed by: INTERNAL MEDICINE

## 2017-10-31 PROCEDURE — 71555 MRI ANGIO CHEST W OR W/O DYE: CPT

## 2017-10-31 RX ORDER — GADOBUTROL 604.72 MG/ML
5-65 INJECTION INTRAVENOUS ONCE
Status: COMPLETED | OUTPATIENT
Start: 2017-10-31 | End: 2017-10-31

## 2017-10-31 RX ORDER — METHYLPREDNISOLONE SODIUM SUCCINATE 125 MG/2ML
125 INJECTION, POWDER, LYOPHILIZED, FOR SOLUTION INTRAMUSCULAR; INTRAVENOUS
Status: DISCONTINUED | OUTPATIENT
Start: 2017-10-31 | End: 2017-11-01 | Stop reason: HOSPADM

## 2017-10-31 RX ORDER — DIPHENHYDRAMINE HCL 25 MG
25 CAPSULE ORAL
Status: DISCONTINUED | OUTPATIENT
Start: 2017-10-31 | End: 2017-11-01 | Stop reason: HOSPADM

## 2017-10-31 RX ORDER — ACYCLOVIR 200 MG/1
0-1 CAPSULE ORAL
Status: DISCONTINUED | OUTPATIENT
Start: 2017-10-31 | End: 2017-11-01 | Stop reason: HOSPADM

## 2017-10-31 RX ORDER — DIPHENHYDRAMINE HYDROCHLORIDE 50 MG/ML
25-50 INJECTION INTRAMUSCULAR; INTRAVENOUS
Status: DISCONTINUED | OUTPATIENT
Start: 2017-10-31 | End: 2017-11-01 | Stop reason: HOSPADM

## 2017-10-31 RX ORDER — DIAZEPAM 5 MG
5 TABLET ORAL EVERY 30 MIN PRN
Status: DISCONTINUED | OUTPATIENT
Start: 2017-10-31 | End: 2017-11-01 | Stop reason: HOSPADM

## 2017-10-31 RX ORDER — ONDANSETRON 2 MG/ML
4 INJECTION INTRAMUSCULAR; INTRAVENOUS
Status: DISCONTINUED | OUTPATIENT
Start: 2017-10-31 | End: 2017-11-01 | Stop reason: HOSPADM

## 2017-10-31 RX ADMIN — GADOBUTROL 26 ML: 604.72 INJECTION INTRAVENOUS at 09:40

## 2017-11-03 ENCOUNTER — TELEPHONE (OUTPATIENT)
Dept: CARDIOLOGY | Facility: CLINIC | Age: 67
End: 2017-11-03

## 2017-11-03 RX ORDER — LIDOCAINE 40 MG/G
CREAM TOPICAL
Status: CANCELLED | OUTPATIENT
Start: 2017-11-03

## 2017-11-03 RX ORDER — SODIUM CHLORIDE 450 MG/100ML
INJECTION, SOLUTION INTRAVENOUS CONTINUOUS
Status: CANCELLED | OUTPATIENT
Start: 2017-11-03

## 2017-11-03 NOTE — TELEPHONE ENCOUNTER
Patient was called to review prep for Monday's afib ablation. His significant other, Patricia Gould, will be his  and care giver. He will be NPO after mid night Sunday but since its a 1:00 procedure time I told him that he could have black coffee early Monday morning. He will hold hi Lasix and KCL. He had many questions about the procedure and afib in general and I answered them to the best of my ability and to his satisfaction. I advised him not to drive for two days when he gets home. I also told him that we will review other instructions for hm when we see him Tuesday morning. Orders have been placed. Camacho

## 2017-11-06 ENCOUNTER — APPOINTMENT (OUTPATIENT)
Dept: CARDIOLOGY | Facility: CLINIC | Age: 67
End: 2017-11-06
Attending: INTERNAL MEDICINE
Payer: MEDICARE

## 2017-11-06 ENCOUNTER — SURGERY (OUTPATIENT)
Age: 67
End: 2017-11-06

## 2017-11-06 ENCOUNTER — ANESTHESIA EVENT (OUTPATIENT)
Dept: SURGERY | Facility: CLINIC | Age: 67
End: 2017-11-06
Payer: MEDICARE

## 2017-11-06 ENCOUNTER — HOSPITAL ENCOUNTER (OUTPATIENT)
Facility: CLINIC | Age: 67
Discharge: HOME | End: 2017-11-07
Attending: INTERNAL MEDICINE | Admitting: INTERNAL MEDICINE
Payer: MEDICARE

## 2017-11-06 ENCOUNTER — ANESTHESIA (OUTPATIENT)
Dept: SURGERY | Facility: CLINIC | Age: 67
End: 2017-11-06
Payer: MEDICARE

## 2017-11-06 DIAGNOSIS — I48.91 ATRIAL FIBRILLATION, UNSPECIFIED TYPE (H): ICD-10-CM

## 2017-11-06 DIAGNOSIS — I10 ESSENTIAL HYPERTENSION: Primary | ICD-10-CM

## 2017-11-06 DIAGNOSIS — I10 ESSENTIAL HYPERTENSION: ICD-10-CM

## 2017-11-06 PROBLEM — I48.20 CHRONIC A-FIB (H): Status: ACTIVE | Noted: 2017-11-06

## 2017-11-06 LAB
ANION GAP SERPL CALCULATED.3IONS-SCNC: 4 MMOL/L (ref 3–14)
BUN SERPL-MCNC: 20 MG/DL (ref 7–30)
CALCIUM SERPL-MCNC: 8.6 MG/DL (ref 8.5–10.1)
CHLORIDE SERPL-SCNC: 106 MMOL/L (ref 94–109)
CO2 SERPL-SCNC: 31 MMOL/L (ref 20–32)
CREAT SERPL-MCNC: 1 MG/DL (ref 0.66–1.25)
ERYTHROCYTE [DISTWIDTH] IN BLOOD BY AUTOMATED COUNT: 13.3 % (ref 10–15)
GFR SERPL CREATININE-BSD FRML MDRD: 75 ML/MIN/1.7M2
GLUCOSE SERPL-MCNC: 113 MG/DL (ref 70–99)
HCT VFR BLD AUTO: 46.9 % (ref 40–53)
HGB BLD-MCNC: 16.2 G/DL (ref 13.3–17.7)
INR PPP: 1.66 (ref 0.86–1.14)
MCH RBC QN AUTO: 35.6 PG (ref 26.5–33)
MCHC RBC AUTO-ENTMCNC: 34.5 G/DL (ref 31.5–36.5)
MCV RBC AUTO: 103 FL (ref 78–100)
PLATELET # BLD AUTO: 190 10E9/L (ref 150–450)
POTASSIUM SERPL-SCNC: 3.9 MMOL/L (ref 3.4–5.3)
RBC # BLD AUTO: 4.55 10E12/L (ref 4.4–5.9)
SODIUM SERPL-SCNC: 141 MMOL/L (ref 133–144)
WBC # BLD AUTO: 10.5 10E9/L (ref 4–11)

## 2017-11-06 PROCEDURE — 40000852 ZZH STATISTIC HEART CATH LAB OR EP LAB

## 2017-11-06 PROCEDURE — 93662 INTRACARDIAC ECG (ICE): CPT

## 2017-11-06 PROCEDURE — 71000013 ZZH RECOVERY PHASE 1 LEVEL 1 EA ADDTL HR

## 2017-11-06 PROCEDURE — 93662 INTRACARDIAC ECG (ICE): CPT | Mod: 26 | Performed by: INTERNAL MEDICINE

## 2017-11-06 PROCEDURE — 93656 COMPRE EP EVAL ABLTJ ATR FIB: CPT

## 2017-11-06 PROCEDURE — C1759 CATH, INTRA ECHOCARDIOGRAPHY: HCPCS

## 2017-11-06 PROCEDURE — B244ZZZ ULTRASONOGRAPHY OF RIGHT HEART: ICD-10-PCS | Performed by: INTERNAL MEDICINE

## 2017-11-06 PROCEDURE — 25000128 H RX IP 250 OP 636: Performed by: INTERNAL MEDICINE

## 2017-11-06 PROCEDURE — C1893 INTRO/SHEATH, FIXED,NON-PEEL: HCPCS

## 2017-11-06 PROCEDURE — 25000566 ZZH SEVOFLURANE, EA 15 MIN

## 2017-11-06 PROCEDURE — 27210995 ZZH RX 272: Performed by: INTERNAL MEDICINE

## 2017-11-06 PROCEDURE — C1732 CATH, EP, DIAG/ABL, 3D/VECT: HCPCS

## 2017-11-06 PROCEDURE — 02583ZZ DESTRUCTION OF CONDUCTION MECHANISM, PERCUTANEOUS APPROACH: ICD-10-PCS | Performed by: INTERNAL MEDICINE

## 2017-11-06 PROCEDURE — 25000128 H RX IP 250 OP 636: Performed by: NURSE ANESTHETIST, CERTIFIED REGISTERED

## 2017-11-06 PROCEDURE — 93613 INTRACARDIAC EPHYS 3D MAPG: CPT | Performed by: INTERNAL MEDICINE

## 2017-11-06 PROCEDURE — A9270 NON-COVERED ITEM OR SERVICE: HCPCS | Mod: GY | Performed by: INTERNAL MEDICINE

## 2017-11-06 PROCEDURE — 93005 ELECTROCARDIOGRAM TRACING: CPT

## 2017-11-06 PROCEDURE — 80048 BASIC METABOLIC PNL TOTAL CA: CPT | Performed by: INTERNAL MEDICINE

## 2017-11-06 PROCEDURE — 27210796 ZZH PAD EXTRNAL REFRENCE CARDIAC MAPPING CR14

## 2017-11-06 PROCEDURE — 93613 INTRACARDIAC EPHYS 3D MAPG: CPT

## 2017-11-06 PROCEDURE — 27210782 ZZH KIT EP TOTES DISP CR7

## 2017-11-06 PROCEDURE — 36415 COLL VENOUS BLD VENIPUNCTURE: CPT | Performed by: INTERNAL MEDICINE

## 2017-11-06 PROCEDURE — 85027 COMPLETE CBC AUTOMATED: CPT | Performed by: INTERNAL MEDICINE

## 2017-11-06 PROCEDURE — 85610 PROTHROMBIN TIME: CPT | Performed by: INTERNAL MEDICINE

## 2017-11-06 PROCEDURE — 37000008 ZZH ANESTHESIA TECHNICAL FEE, 1ST 30 MIN

## 2017-11-06 PROCEDURE — 4A023FZ MEASUREMENT OF CARDIAC RHYTHM, PERCUTANEOUS APPROACH: ICD-10-PCS | Performed by: INTERNAL MEDICINE

## 2017-11-06 PROCEDURE — 27211287 ZZ H NEEDLE BRK TRANSEPTAL CR10

## 2017-11-06 PROCEDURE — 27210964 ZZH ACCESS EP PROC CR8

## 2017-11-06 PROCEDURE — 25000125 ZZHC RX 250: Performed by: INTERNAL MEDICINE

## 2017-11-06 PROCEDURE — 25000132 ZZH RX MED GY IP 250 OP 250 PS 637: Mod: GY | Performed by: INTERNAL MEDICINE

## 2017-11-06 PROCEDURE — 27210795 ZZH PAD DEFIB QUICK CR4

## 2017-11-06 PROCEDURE — 93655 ICAR CATH ABLTJ DSCRT ARRHYT: CPT

## 2017-11-06 PROCEDURE — 40000936 ZZH STATISTIC OUTPATIENT (NON-OBS) NIGHT

## 2017-11-06 PROCEDURE — 02K83ZZ MAP CONDUCTION MECHANISM, PERCUTANEOUS APPROACH: ICD-10-PCS | Performed by: INTERNAL MEDICINE

## 2017-11-06 PROCEDURE — 71000012 ZZH RECOVERY PHASE 1 LEVEL 1 FIRST HR

## 2017-11-06 PROCEDURE — 40000065 ZZH STATISTIC EKG NON-CHARGEABLE

## 2017-11-06 PROCEDURE — 37000009 ZZH ANESTHESIA TECHNICAL FEE, EACH ADDTL 15 MIN

## 2017-11-06 PROCEDURE — 93010 ELECTROCARDIOGRAM REPORT: CPT | Performed by: INTERNAL MEDICINE

## 2017-11-06 PROCEDURE — 25000125 ZZHC RX 250: Performed by: NURSE ANESTHETIST, CERTIFIED REGISTERED

## 2017-11-06 PROCEDURE — 40000935 ZZH STATISTIC OUTPATIENT (NON-OBS) EVE

## 2017-11-06 PROCEDURE — 4A0234Z MEASUREMENT OF CARDIAC ELECTRICAL ACTIVITY, PERCUTANEOUS APPROACH: ICD-10-PCS | Performed by: INTERNAL MEDICINE

## 2017-11-06 PROCEDURE — 85347 COAGULATION TIME ACTIVATED: CPT

## 2017-11-06 PROCEDURE — 93656 COMPRE EP EVAL ABLTJ ATR FIB: CPT | Performed by: INTERNAL MEDICINE

## 2017-11-06 RX ORDER — FLUMAZENIL 0.1 MG/ML
0.2 INJECTION, SOLUTION INTRAVENOUS
Status: DISCONTINUED | OUTPATIENT
Start: 2017-11-06 | End: 2017-11-06 | Stop reason: HOSPADM

## 2017-11-06 RX ORDER — PROTAMINE SULFATE 10 MG/ML
5-40 INJECTION, SOLUTION INTRAVENOUS EVERY 10 MIN PRN
Status: DISCONTINUED | OUTPATIENT
Start: 2017-11-06 | End: 2017-11-06 | Stop reason: HOSPADM

## 2017-11-06 RX ORDER — ONDANSETRON 2 MG/ML
4 INJECTION INTRAMUSCULAR; INTRAVENOUS EVERY 4 HOURS PRN
Status: DISCONTINUED | OUTPATIENT
Start: 2017-11-06 | End: 2017-11-06 | Stop reason: HOSPADM

## 2017-11-06 RX ORDER — MORPHINE SULFATE 2 MG/ML
1-2 INJECTION, SOLUTION INTRAMUSCULAR; INTRAVENOUS EVERY 5 MIN PRN
Status: DISCONTINUED | OUTPATIENT
Start: 2017-11-06 | End: 2017-11-06 | Stop reason: HOSPADM

## 2017-11-06 RX ORDER — ADENOSINE 3 MG/ML
6-12 INJECTION, SOLUTION INTRAVENOUS EVERY 5 MIN PRN
Status: DISCONTINUED | OUTPATIENT
Start: 2017-11-06 | End: 2017-11-06 | Stop reason: HOSPADM

## 2017-11-06 RX ORDER — PROMETHAZINE HYDROCHLORIDE 25 MG/ML
6.25-25 INJECTION, SOLUTION INTRAMUSCULAR; INTRAVENOUS EVERY 4 HOURS PRN
Status: DISCONTINUED | OUTPATIENT
Start: 2017-11-06 | End: 2017-11-06 | Stop reason: HOSPADM

## 2017-11-06 RX ORDER — HYDROMORPHONE HYDROCHLORIDE 1 MG/ML
.3-.5 INJECTION, SOLUTION INTRAMUSCULAR; INTRAVENOUS; SUBCUTANEOUS EVERY 10 MIN PRN
Status: DISCONTINUED | OUTPATIENT
Start: 2017-11-06 | End: 2017-11-06 | Stop reason: HOSPADM

## 2017-11-06 RX ORDER — NEOSTIGMINE METHYLSULFATE 1 MG/ML
VIAL (ML) INJECTION PRN
Status: DISCONTINUED | OUTPATIENT
Start: 2017-11-06 | End: 2017-11-06

## 2017-11-06 RX ORDER — KETOROLAC TROMETHAMINE 30 MG/ML
15 INJECTION, SOLUTION INTRAMUSCULAR; INTRAVENOUS
Status: DISCONTINUED | OUTPATIENT
Start: 2017-11-06 | End: 2017-11-06 | Stop reason: HOSPADM

## 2017-11-06 RX ORDER — ONDANSETRON 4 MG/1
4 TABLET, ORALLY DISINTEGRATING ORAL EVERY 30 MIN PRN
Status: DISCONTINUED | OUTPATIENT
Start: 2017-11-06 | End: 2017-11-06 | Stop reason: HOSPADM

## 2017-11-06 RX ORDER — LOSARTAN POTASSIUM 100 MG/1
100 TABLET ORAL DAILY
Status: DISCONTINUED | OUTPATIENT
Start: 2017-11-06 | End: 2017-11-07 | Stop reason: HOSPADM

## 2017-11-06 RX ORDER — ONDANSETRON 2 MG/ML
INJECTION INTRAMUSCULAR; INTRAVENOUS PRN
Status: DISCONTINUED | OUTPATIENT
Start: 2017-11-06 | End: 2017-11-06

## 2017-11-06 RX ORDER — OXYCODONE AND ACETAMINOPHEN 5; 325 MG/1; MG/1
1 TABLET ORAL EVERY 4 HOURS PRN
Status: DISCONTINUED | OUTPATIENT
Start: 2017-11-06 | End: 2017-11-07 | Stop reason: HOSPADM

## 2017-11-06 RX ORDER — LIDOCAINE HYDROCHLORIDE AND EPINEPHRINE 10; 10 MG/ML; UG/ML
10-30 INJECTION, SOLUTION INFILTRATION; PERINEURAL
Status: DISCONTINUED | OUTPATIENT
Start: 2017-11-06 | End: 2017-11-06 | Stop reason: HOSPADM

## 2017-11-06 RX ORDER — LIDOCAINE 40 MG/G
CREAM TOPICAL
Status: DISCONTINUED | OUTPATIENT
Start: 2017-11-06 | End: 2017-11-06 | Stop reason: HOSPADM

## 2017-11-06 RX ORDER — ACETAMINOPHEN 325 MG/1
650 TABLET ORAL EVERY 4 HOURS PRN
Status: DISCONTINUED | OUTPATIENT
Start: 2017-11-06 | End: 2017-11-07 | Stop reason: HOSPADM

## 2017-11-06 RX ORDER — FINASTERIDE 1 MG/1
1 TABLET, FILM COATED ORAL DAILY
Status: DISCONTINUED | OUTPATIENT
Start: 2017-11-06 | End: 2017-11-06

## 2017-11-06 RX ORDER — NALOXONE HYDROCHLORIDE 0.4 MG/ML
.1-.4 INJECTION, SOLUTION INTRAMUSCULAR; INTRAVENOUS; SUBCUTANEOUS
Status: DISCONTINUED | OUTPATIENT
Start: 2017-11-06 | End: 2017-11-06 | Stop reason: HOSPADM

## 2017-11-06 RX ORDER — FUROSEMIDE 10 MG/ML
20-100 INJECTION INTRAMUSCULAR; INTRAVENOUS
Status: DISCONTINUED | OUTPATIENT
Start: 2017-11-06 | End: 2017-11-06 | Stop reason: HOSPADM

## 2017-11-06 RX ORDER — PROTAMINE SULFATE 10 MG/ML
1-5 INJECTION, SOLUTION INTRAVENOUS
Status: DISCONTINUED | OUTPATIENT
Start: 2017-11-06 | End: 2017-11-06 | Stop reason: HOSPADM

## 2017-11-06 RX ORDER — BUPIVACAINE HYDROCHLORIDE 2.5 MG/ML
10-30 INJECTION, SOLUTION EPIDURAL; INFILTRATION; INTRACAUDAL
Status: DISCONTINUED | OUTPATIENT
Start: 2017-11-06 | End: 2017-11-06 | Stop reason: HOSPADM

## 2017-11-06 RX ORDER — PROPOFOL 10 MG/ML
INJECTION, EMULSION INTRAVENOUS PRN
Status: DISCONTINUED | OUTPATIENT
Start: 2017-11-06 | End: 2017-11-06

## 2017-11-06 RX ORDER — AMIODARONE HYDROCHLORIDE 200 MG/1
200 TABLET ORAL 2 TIMES DAILY
Status: DISCONTINUED | OUTPATIENT
Start: 2017-11-06 | End: 2017-11-07 | Stop reason: HOSPADM

## 2017-11-06 RX ORDER — DIPHENHYDRAMINE HYDROCHLORIDE 50 MG/ML
25-50 INJECTION INTRAMUSCULAR; INTRAVENOUS
Status: DISCONTINUED | OUTPATIENT
Start: 2017-11-06 | End: 2017-11-06 | Stop reason: HOSPADM

## 2017-11-06 RX ORDER — LIDOCAINE HYDROCHLORIDE 10 MG/ML
10-30 INJECTION, SOLUTION EPIDURAL; INFILTRATION; INTRACAUDAL; PERINEURAL
Status: DISCONTINUED | OUTPATIENT
Start: 2017-11-06 | End: 2017-11-06 | Stop reason: HOSPADM

## 2017-11-06 RX ORDER — INDOMETHACIN 50 MG/1
50 CAPSULE ORAL 3 TIMES DAILY PRN
Status: DISCONTINUED | OUTPATIENT
Start: 2017-11-06 | End: 2017-11-07 | Stop reason: HOSPADM

## 2017-11-06 RX ORDER — DOBUTAMINE HYDROCHLORIDE 200 MG/100ML
5-40 INJECTION INTRAVENOUS CONTINUOUS PRN
Status: DISCONTINUED | OUTPATIENT
Start: 2017-11-06 | End: 2017-11-06 | Stop reason: HOSPADM

## 2017-11-06 RX ORDER — SODIUM CHLORIDE 450 MG/100ML
INJECTION, SOLUTION INTRAVENOUS CONTINUOUS
Status: DISCONTINUED | OUTPATIENT
Start: 2017-11-06 | End: 2017-11-06 | Stop reason: HOSPADM

## 2017-11-06 RX ORDER — LIDOCAINE 40 MG/G
CREAM TOPICAL
Status: DISCONTINUED | OUTPATIENT
Start: 2017-11-06 | End: 2017-11-07 | Stop reason: HOSPADM

## 2017-11-06 RX ORDER — GLYCOPYRROLATE 0.2 MG/ML
INJECTION, SOLUTION INTRAMUSCULAR; INTRAVENOUS PRN
Status: DISCONTINUED | OUTPATIENT
Start: 2017-11-06 | End: 2017-11-06

## 2017-11-06 RX ORDER — MEPERIDINE HYDROCHLORIDE 25 MG/ML
12.5 INJECTION INTRAMUSCULAR; INTRAVENOUS; SUBCUTANEOUS
Status: DISCONTINUED | OUTPATIENT
Start: 2017-11-06 | End: 2017-11-06 | Stop reason: HOSPADM

## 2017-11-06 RX ORDER — FUROSEMIDE 40 MG
40 TABLET ORAL DAILY
Status: DISCONTINUED | OUTPATIENT
Start: 2017-11-07 | End: 2017-11-07 | Stop reason: HOSPADM

## 2017-11-06 RX ORDER — ONDANSETRON 2 MG/ML
4 INJECTION INTRAMUSCULAR; INTRAVENOUS EVERY 30 MIN PRN
Status: DISCONTINUED | OUTPATIENT
Start: 2017-11-06 | End: 2017-11-06 | Stop reason: HOSPADM

## 2017-11-06 RX ORDER — ATROPINE SULFATE 0.1 MG/ML
.5-1 INJECTION INTRAVENOUS
Status: DISCONTINUED | OUTPATIENT
Start: 2017-11-06 | End: 2017-11-06 | Stop reason: HOSPADM

## 2017-11-06 RX ORDER — SODIUM CHLORIDE, SODIUM LACTATE, POTASSIUM CHLORIDE, CALCIUM CHLORIDE 600; 310; 30; 20 MG/100ML; MG/100ML; MG/100ML; MG/100ML
INJECTION, SOLUTION INTRAVENOUS CONTINUOUS
Status: DISCONTINUED | OUTPATIENT
Start: 2017-11-06 | End: 2017-11-06 | Stop reason: HOSPADM

## 2017-11-06 RX ORDER — LIDOCAINE HYDROCHLORIDE 20 MG/ML
INJECTION, SOLUTION INFILTRATION; PERINEURAL PRN
Status: DISCONTINUED | OUTPATIENT
Start: 2017-11-06 | End: 2017-11-06

## 2017-11-06 RX ORDER — POTASSIUM CHLORIDE 1500 MG/1
20 TABLET, EXTENDED RELEASE ORAL DAILY
Status: DISCONTINUED | OUTPATIENT
Start: 2017-11-06 | End: 2017-11-07 | Stop reason: HOSPADM

## 2017-11-06 RX ORDER — FENTANYL CITRATE 50 UG/ML
25-50 INJECTION, SOLUTION INTRAMUSCULAR; INTRAVENOUS
Status: DISCONTINUED | OUTPATIENT
Start: 2017-11-06 | End: 2017-11-06 | Stop reason: HOSPADM

## 2017-11-06 RX ORDER — NALOXONE HYDROCHLORIDE 0.4 MG/ML
.1-.4 INJECTION, SOLUTION INTRAMUSCULAR; INTRAVENOUS; SUBCUTANEOUS
Status: DISCONTINUED | OUTPATIENT
Start: 2017-11-06 | End: 2017-11-07 | Stop reason: HOSPADM

## 2017-11-06 RX ORDER — FENTANYL CITRATE 50 UG/ML
INJECTION, SOLUTION INTRAMUSCULAR; INTRAVENOUS PRN
Status: DISCONTINUED | OUTPATIENT
Start: 2017-11-06 | End: 2017-11-06

## 2017-11-06 RX ORDER — ALLOPURINOL 300 MG/1
300 TABLET ORAL DAILY
Status: DISCONTINUED | OUTPATIENT
Start: 2017-11-07 | End: 2017-11-07 | Stop reason: HOSPADM

## 2017-11-06 RX ORDER — LORAZEPAM 2 MG/ML
.5-2 INJECTION INTRAMUSCULAR EVERY 10 MIN PRN
Status: DISCONTINUED | OUTPATIENT
Start: 2017-11-06 | End: 2017-11-06 | Stop reason: HOSPADM

## 2017-11-06 RX ORDER — IBUTILIDE FUMARATE 1 MG/10ML
0.01 INJECTION, SOLUTION INTRAVENOUS
Status: DISCONTINUED | OUTPATIENT
Start: 2017-11-06 | End: 2017-11-06 | Stop reason: HOSPADM

## 2017-11-06 RX ORDER — IBUTILIDE FUMARATE 1 MG/10ML
1 INJECTION, SOLUTION INTRAVENOUS
Status: DISCONTINUED | OUTPATIENT
Start: 2017-11-06 | End: 2017-11-06 | Stop reason: HOSPADM

## 2017-11-06 RX ORDER — HEPARIN SODIUM 1000 [USP'U]/ML
1000-10000 INJECTION, SOLUTION INTRAVENOUS; SUBCUTANEOUS EVERY 5 MIN PRN
Status: DISCONTINUED | OUTPATIENT
Start: 2017-11-06 | End: 2017-11-06 | Stop reason: HOSPADM

## 2017-11-06 RX ORDER — NALOXONE HYDROCHLORIDE 0.4 MG/ML
0.4 INJECTION, SOLUTION INTRAMUSCULAR; INTRAVENOUS; SUBCUTANEOUS EVERY 5 MIN PRN
Status: DISCONTINUED | OUTPATIENT
Start: 2017-11-06 | End: 2017-11-06 | Stop reason: HOSPADM

## 2017-11-06 RX ORDER — DEXAMETHASONE SODIUM PHOSPHATE 4 MG/ML
INJECTION, SOLUTION INTRA-ARTICULAR; INTRALESIONAL; INTRAMUSCULAR; INTRAVENOUS; SOFT TISSUE PRN
Status: DISCONTINUED | OUTPATIENT
Start: 2017-11-06 | End: 2017-11-06

## 2017-11-06 RX ORDER — AMLODIPINE BESYLATE 5 MG/1
5 TABLET ORAL 2 TIMES DAILY
Status: DISCONTINUED | OUTPATIENT
Start: 2017-11-06 | End: 2017-11-07 | Stop reason: HOSPADM

## 2017-11-06 RX ADMIN — AMLODIPINE BESYLATE 5 MG: 5 TABLET ORAL at 20:02

## 2017-11-06 RX ADMIN — PROPOFOL 200 MG: 10 INJECTION, EMULSION INTRAVENOUS at 13:00

## 2017-11-06 RX ADMIN — SODIUM CHLORIDE: 4.5 INJECTION, SOLUTION INTRAVENOUS at 12:19

## 2017-11-06 RX ADMIN — LOSARTAN POTASSIUM 100 MG: 100 TABLET, FILM COATED ORAL at 20:02

## 2017-11-06 RX ADMIN — AMIODARONE HYDROCHLORIDE 200 MG: 200 TABLET ORAL at 20:02

## 2017-11-06 RX ADMIN — GLYCOPYRROLATE 1 MG: 0.2 INJECTION, SOLUTION INTRAMUSCULAR; INTRAVENOUS at 15:13

## 2017-11-06 RX ADMIN — PHENYLEPHRINE HYDROCHLORIDE 0.25 MCG/KG/MIN: 10 INJECTION, SOLUTION INTRAMUSCULAR; INTRAVENOUS; SUBCUTANEOUS at 13:08

## 2017-11-06 RX ADMIN — Medication 8000 UNITS: at 13:48

## 2017-11-06 RX ADMIN — LIDOCAINE HYDROCHLORIDE 200 MG: 10 INJECTION, SOLUTION EPIDURAL; INFILTRATION; INTRACAUDAL; PERINEURAL at 13:36

## 2017-11-06 RX ADMIN — Medication 10000 UNITS: at 13:36

## 2017-11-06 RX ADMIN — PHENYLEPHRINE HYDROCHLORIDE 100 MCG: 10 INJECTION INTRAVENOUS at 13:43

## 2017-11-06 RX ADMIN — MIDAZOLAM HYDROCHLORIDE 2 MG: 1 INJECTION, SOLUTION INTRAMUSCULAR; INTRAVENOUS at 12:58

## 2017-11-06 RX ADMIN — DEXAMETHASONE SODIUM PHOSPHATE 4 MG: 4 INJECTION, SOLUTION INTRA-ARTICULAR; INTRALESIONAL; INTRAMUSCULAR; INTRAVENOUS; SOFT TISSUE at 13:13

## 2017-11-06 RX ADMIN — PROTAMINE SULFATE 50 MG: 10 INJECTION, SOLUTION INTRAVENOUS at 14:54

## 2017-11-06 RX ADMIN — PHENYLEPHRINE HYDROCHLORIDE 100 MCG: 10 INJECTION INTRAVENOUS at 13:35

## 2017-11-06 RX ADMIN — ONDANSETRON 4 MG: 2 INJECTION INTRAMUSCULAR; INTRAVENOUS at 15:13

## 2017-11-06 RX ADMIN — FENTANYL CITRATE 100 MCG: 50 INJECTION, SOLUTION INTRAMUSCULAR; INTRAVENOUS at 13:00

## 2017-11-06 RX ADMIN — FUROSEMIDE 40 MG: 10 INJECTION, SOLUTION INTRAVENOUS at 14:21

## 2017-11-06 RX ADMIN — ROCURONIUM BROMIDE 50 MG: 10 INJECTION INTRAVENOUS at 13:01

## 2017-11-06 RX ADMIN — LIDOCAINE HYDROCHLORIDE 100 MG: 20 INJECTION, SOLUTION INFILTRATION; PERINEURAL at 13:00

## 2017-11-06 RX ADMIN — NEOSTIGMINE METHYLSULFATE 5 MG: 1 INJECTION INTRAMUSCULAR; INTRAVENOUS; SUBCUTANEOUS at 15:13

## 2017-11-06 ASSESSMENT — ENCOUNTER SYMPTOMS
SEIZURES: 0
DYSRHYTHMIAS: 1

## 2017-11-06 ASSESSMENT — LIFESTYLE VARIABLES: TOBACCO_USE: 0

## 2017-11-06 NOTE — TELEPHONE ENCOUNTER
metoprolol      Last Written Prescription Date: 10/12/17  Last Fill Quantity: na, # refills: na    Last Office Visit with G, P or [facility] prescribing provider:  10/12/17   Future Office Visit:    0    BP Readings from Last 3 Encounters:   10/27/17 130/80   10/12/17 (!) 148/102   03/09/17 130/86

## 2017-11-06 NOTE — IP AVS SNAPSHOT
MRN:[id number]                      After Visit Summary   11/6/2017    Mike Schultz    MRN: [id number]           Thank you!     Thank you for choosing [facility] for your care. Our goal is always to provide you with excellent care. Hearing back from our patients is one way we can continue to improve our services. Please take a few minutes to complete the written survey that you may receive in the mail after you visit with us. Thank you!        Patient Information     Date Of Birth          1950        About your hospital stay     You were admitted on:  November 6, 2017 [facility]    You were discharged on:  November 7, 2017       Who to Call     For medical emergencies, please call 911.  For non-urgent questions about your medical care, please call your primary care provider or clinic, [phone]  For questions related to your surgery, please call your surgery clinic        Attending Provider     Provider Fady Rob MD Cardiology       Primary Care Provider Office Phone # Fax #    Johnny Kay [phone]      Your next 10 appointments already scheduled     Nov 13, 2017  8:00 AM CST   P EP RETURN with Natalie Campos PA-C   [facility] ([facility])    [address] [place] [id number] [phone]            Dec 15, 2017  1:15 PM CST   UMP EP RETURN with Fady Kent MD   [facility] ([facility])    [address] [place] [id number] [phone]              Further instructions from your care team       A Fib Ablation Discharge Instructions    After you go home:    Have an adult stay with you until tomorrow.    You may eat your normal diet, unless your doctor tells you otherwise.    Relax and take it easy for 3 days.        For 24-48 hours (due to the  sedation you received):    DO NOT DRIVE FOR 2 DAYS!     Do NOT make any important or legal decisions.    Do NOT drive or operate machines at home or at work.    Do NOT drink alcohol.    Care of Puncture Site:    Check the puncture sites every 1-2 hours while awake.    For 2-3 days, when you cough, sneeze, laugh or move your bowels, hold your hand over the puncture sites and press firmly.    Remove the band aids after 24 hours. If there is minor oozing, apply another band aid and remove it after 12 hours.    It is normal to have a small bruise or pea size lump at the sites.    You may shower. Do NOT take a bath, or use a hot tub or pool until groin site heals, which may take up to a week.  Do NOT scrub the site. Do not use lotion or powder near the puncture site.    Activity:    Do NOT lift, push or pull more than 10 pounds for 3 days.    NO repetitive motions such as loading  or vacuuming.     Bleeding:    If you start bleeding from the groin site, lie down flat and press firmly on the site for 10 minutes or until bleeding stops.     Once bleeding stops, lay flat for 2 hours.    Call your A Fib nurse if bleeding does not stop or after hours will need to go to ER.       Go to ER or Call 911 right away if you have heavy bleeding or bleeding that does not stop.    Medications:    STOP lopressor (metoprolol tartrate).    START amlodipine 5 mg twice a day for your BP    START amiodarone 200 mg.  Take 1 tablet twice daily x 2 weeks, then we'll decrease it to 200 mg daily x 2-3 months. This is to try and keep your heart in normal rhythm while you're healing    CONTINUE Xarelto 20 mg daily without stopping    Take an EXTRA 20 mg of furosemide (Lasix) today at noon. Tomorrow (Wednesday) take 40 mg in AM like normal and take another 20 mg at noon    CONTINUE potassium like normal    If you have pain, you may take Advil (ibuprofen) or Tylenol (acetaminophen).    Call the A Fib RN if:    Chest pain not relieved by  "tylenol or ibuprofen    Difficulty swallowing and/or coughing up blood    Shortness of breath    Increased pain or a large or growing hard lump around the site.    Groin site is red, swollen, hot or tender.    Blood or fluid is draining from the groin site.    You have chills or a fever greater than 101 F (38 C).    Your leg feels numb, cool or changes color.    If groin pain is not relieved by Tylenol or Ibuprofen.    Recurrent irregular or fast heart rate lasting over 2-3 hours.    Any questions or concerns.    Heart rhythms:  You may have some irregular heartbeats. These feel very strong. They may make you feel that the A Fib is going to start again.  Give it time. The irregular beats should occur less often.    Follow Up Appointments:    11/13/17 Monday at 8:00 with Crystal LIMON         12/15/17 Friday at 1:15 with Dr. Day     [facility]: A Fib clinic RN's Mervat Hidalgo [phone] (Mon-Fri, 8:00-5:00)                                                                                                      [phone] (7 days a week) after hours for on call Cardiologist.     Pending Results     Date and Time Order Name Status Description    11/7/2017 0804 EKG 12-lead, tracing only Preliminary     11/6/2017 1126 EKG 12-lead, tracing only Preliminary             Statement of Approval     Ordered          11/07/17 0811  I have reviewed and agree with all the recommendations and orders detailed in this document.  EFFECTIVE NOW     Approved and electronically signed by:  Natalie Campos PA-C             Admission Information     Date & Time Provider Department Dept. Phone    11/6/2017 Fady Day MD [facility] [phone]      Your Vitals Were     Blood Pressure Pulse Temperature Respirations Height Weight    135/89 (BP Location: Left arm) 69 96.5  F (35.8  C) (Oral) 18 1.676 m (5' 6\") 131.8 kg (290 lb 8 oz)    Pulse Oximetry BMI (Body Mass Index)                95% " 46.89 kg/m2          Aunt [facility] Information     [facility] gives you secure access to your electronic health record. If you see a primary care provider, you can also send messages to your care team and make appointments. If you have questions, please call your primary care clinic.  If you do not have a primary care provider, please call [phone] and they will assist you.        Care EveryWhere ID     This is your Care EveryWhere ID. This could be used by other organizations to access your [facility] medical records  [id number]        Equal Access to Services     SABAS STOKES : Hadii aad ku hadasho Soomaali, waaxda luqadaha, qaybta kaalmada adeegyada, kaykay choudhury . So [facility] [phone].    ATENCIÓN: Si habla español, tiene a hightower disposición servicios gratuitos de asistencia lingüística. Llame al [phone].    We comply with applicable federal civil rights laws and [place] laws. We do not discriminate on the basis of race, color, national origin, age, disability, sex, sexual orientation, or gender identity.               Review of your medicines      UNREVIEWED medicines. Ask your doctor about these medicines        Dose / Directions    metoprolol 50 MG tablet   Commonly known as:  LOPRESSOR   Used for:  Essential hypertension, Atrial fibrillation, unspecified type (H)   Ask about: Which instructions should I use?        Dose:  100 mg   Take 2 tablets (100 mg) by mouth 2 times daily   Quantity:  60 tablet   Refills:  4         START taking        Dose / Directions    amiodarone 200 MG tablet   Commonly known as:  PACERONE/CODARONE   Used for:  Atrial fibrillation, unspecified type (H)        Dose:  200 mg   Take 1 tablet (200 mg) by mouth 2 times daily for 14 days   Quantity:  28 tablet   Refills:  0       amLODIPine 5 MG tablet   Commonly known as:  NORVASC   Used for:  Essential hypertension        Dose:  5 mg   Take 1 tablet (5 mg) by mouth 2 times daily   Quantity:  60 tablet    Refills:  0         CONTINUE these medicines which have NOT CHANGED        Dose / Directions    acetaminophen 325 MG tablet   Commonly known as:  TYLENOL        Dose:  650 mg   Take 650 mg by mouth   Refills:  0       allopurinol 300 MG tablet   Commonly known as:  ZYLOPRIM   Used for:  Essential hypertension with goal blood pressure less than 140/90        Refills:  0       furosemide 20 MG tablet   Commonly known as:  LASIX   Used for:  Peripheral edema        Dose:  40 mg   Take 2 tablets (40 mg) by mouth daily   Quantity:  180 tablet   Refills:  prn       indomethacin 50 MG capsule   Commonly known as:  INDOCIN   Used for:  Gout of foot, unspecified cause, unspecified chronicity, unspecified laterality        Dose:  50 mg   Take 1 capsule (50 mg) by mouth 3 times daily as needed for moderate pain with food   Quantity:  30 capsule   Refills:  1       losartan 100 MG tablet   Commonly known as:  COZAAR   Used for:  Essential hypertension with goal blood pressure less than 140/90        Take 1 tablet by mouth daily   Quantity:  90 tablet   Refills:  1       order for DME        Equipment ordered: RESMED CPAP Mask type: Full face  Settings: 15   Refills:  0       potassium chloride SA 10 MEQ CR tablet   Commonly known as:  K-DUR/KLOR-CON M   Used for:  Peripheral edema        Dose:  20 mEq   Take 2 tablets (20 mEq) by mouth daily   Quantity:  180 tablet   Refills:  prn       PROPECIA 1 MG tablet   Generic drug:  finasteride        Dose:  1 mg   Take 1 mg by mouth daily.   Refills:  0       rivaroxaban ANTICOAGULANT 20 MG Tabs tablet   Commonly known as:  XARELTO   Used for:  Persistent atrial fibrillation (H)        Dose:  20 mg   Take 1 tablet (20 mg) by mouth daily (with dinner) AVIOD USE OF ALCOHOL WHILE USING MEDICATION   Quantity:  90 tablet   Refills:  PRN       senna 8.6 MG tablet   Commonly known as:  SENOKOT        Dose:  8.6 mg   Take 8.6 mg by mouth   Refills:  0       VITAMIN B 12 PO        Refills:  0          STOP taking     ASPIRIN PO                Where to get your medicines      These medications were sent to [id number] [place], [place] - [address] [facility]  [address] [facility] [address]     Phone:  [phone]     metoprolol 50 MG tablet         These medications were sent to [facility] [place], [place] - [address]  [address] [facility] [address] [address]     Phone:  [phone]     amiodarone 200 MG tablet    amLODIPine 5 MG tablet                Protect others around you: Learn how to safely use, store and throw away your medicines at www.disposemymeds.org.             Medication List: This is a list of all your medications and when to take them. Check marks below indicate your daily home schedule. Keep this list as a reference.      Medications           Morning Afternoon Evening Bedtime As Needed    acetaminophen 325 MG tablet   Commonly known as:  TYLENOL   Take 650 mg by mouth   Last time this was given:  650 mg on 11/7/2017  6:41 AM                                allopurinol 300 MG tablet   Commonly known as:  ZYLOPRIM   Last time this was given:  300 mg on 11/7/2017  8:44 AM                                amiodarone 200 MG tablet   Commonly known as:  PACERONE/CODARONE   Take 1 tablet (200 mg) by mouth 2 times daily for 14 days   Last time this was given:  200 mg on 11/7/2017  8:43 AM                                amLODIPine 5 MG tablet   Commonly known as:  NORVASC   Take 1 tablet (5 mg) by mouth 2 times daily   Last time this was given:  5 mg on 11/7/2017  8:43 AM                                furosemide 20 MG tablet   Commonly known as:  LASIX   Take 2 tablets (40 mg) by mouth daily                                indomethacin 50 MG capsule   Commonly known as:  INDOCIN   Take 1 capsule (50 mg) by mouth 3 times daily as needed for moderate pain with food                                losartan 100 MG tablet   Commonly known as:  COZAAR   Take  1 tablet by mouth daily   Last time this was given:  100 mg on 11/7/2017  8:44 AM                                order for DME   Equipment ordered: RESMED CPAP Mask type: Full face  Settings: 15                                potassium chloride SA 10 MEQ CR tablet   Commonly known as:  K-DUR/KLOR-CON M   Take 2 tablets (20 mEq) by mouth daily   Last time this was given:  20 mEq on 11/7/2017  8:43 AM                                PROPECIA 1 MG tablet   Take 1 mg by mouth daily.   Generic drug:  finasteride                                rivaroxaban ANTICOAGULANT 20 MG Tabs tablet   Commonly known as:  XARELTO   Take 1 tablet (20 mg) by mouth daily (with dinner) AVIOD USE OF ALCOHOL WHILE USING MEDICATION                                senna 8.6 MG tablet   Commonly known as:  SENOKOT   Take 8.6 mg by mouth                                VITAMIN B 12 PO                                  ASK your doctor about these medications           Morning Afternoon Evening Bedtime As Needed    metoprolol 50 MG tablet   Commonly known as:  LOPRESSOR   Take 2 tablets (100 mg) by mouth 2 times daily   Ask about: Which instructions should I use?

## 2017-11-06 NOTE — PROGRESS NOTES
Uneventful isolation of all 4 PVs and SVC. Empiric ablation of CTI. Stop Metoprolol and start Norvasc for HTN. Start Amiodarone 200 mg bid for 2 weeks then 200 mg daily for 2-3 months. Resume Xarelto. Observe overnight.

## 2017-11-06 NOTE — ANESTHESIA CARE TRANSFER NOTE
Patient: Mike Schultz    Procedure(s):  AFIB ABLATION    Diagnosis: AFIB  Diagnosis Additional Information: No value filed.    Anesthesia Type:   General, ETT     Note:  Airway :Face Mask  Patient transferred to:PACU  Handoff Report: Identifed the Patient, Identified the Reponsible Provider, Reviewed the pertinent medical history, Discussed the surgical course, Reviewed Intra-OP anesthesia mangement and issues during anesthesia, Set expectations for post-procedure period and Allowed opportunity for questions and acknowledgement of understanding      Vitals: (Last set prior to Anesthesia Care Transfer)    CRNA VITALS  11/6/2017 1502 - 11/6/2017 1540      11/6/2017             SpO2: 96 %    Resp Rate (set): 10                Electronically Signed By: AYAN Mendez CRNA  November 6, 2017  3:40 PM

## 2017-11-06 NOTE — PROGRESS NOTES
Admitted to care suites. IV started, labs drawn, EKG obtained, see by anesthesiologist. Consented at bedside by Dr. Day. Questions answered.

## 2017-11-06 NOTE — IP AVS SNAPSHOT
[facility]    [address] [place] [address]    Phone:  [phone]                                       After Visit Summary   11/6/2017    Mike Schultz    MRN: [id number]           After Visit Summary Signature Page     I have received my discharge instructions, and my questions have been answered. I have discussed any challenges I see with this plan with the nurse or doctor.    ..........................................................................................................................................  Patient/Patient Representative Signature      ..........................................................................................................................................  Patient Representative Print Name and Relationship to Patient    ..................................................               ................................................  Date                                            Time    ..........................................................................................................................................  Reviewed by Signature/Title    ...................................................              ..............................................  Date                                                            Time

## 2017-11-06 NOTE — ANESTHESIA POSTPROCEDURE EVALUATION
Patient: Mike Schultz    Procedure(s):  AFIB ABLATION    Diagnosis:AFIB  Diagnosis Additional Information: No value filed.    Anesthesia Type:  General, ETT    Note:  Anesthesia Post Evaluation    Patient location during evaluation: PACU  Patient participation: Able to fully participate in evaluation  Level of consciousness: awake  Pain management: adequate  Airway patency: patent  Cardiovascular status: acceptable  Respiratory status: acceptable  Hydration status: acceptable  PONV: none     Anesthetic complications: None          Last vitals:  Vitals:    11/06/17 1550 11/06/17 1600 11/06/17 1610   BP: (!) 140/91 (!) 142/97 (!) 150/98   Pulse:      Resp: 19 29 17   Temp:      SpO2: 97% 97% 93%         Electronically Signed By: OSWALDO PITTS MD  November 6, 2017  4:19 PM

## 2017-11-06 NOTE — ANESTHESIA PREPROCEDURE EVALUATION
Anesthesia Evaluation     . Pt has had prior anesthetic.     No history of anesthetic complications          ROS/MED HX    ENT/Pulmonary:     (+)sleep apnea, , . .   (-) tobacco use   Neurologic:      (-) seizures and CVA   Cardiovascular:     (+) Dyslipidemia, hypertension----. Taking blood thinners : . . . :. dysrhythmias a-fib, .       METS/Exercise Tolerance:  >4 METS   Hematologic:         Musculoskeletal:         GI/Hepatic:        (-) GERD and liver disease   Renal/Genitourinary:      (-) renal disease   Endo:     (+) Obesity, .   (-) Type II DM and thyroid disease   Psychiatric:         Infectious Disease:         Malignancy:         Other:                     Physical Exam  Normal systems: cardiovascular, pulmonary and dental    Airway   Mallampati: II  TM distance: >3 FB  Neck ROM: full    Dental     Cardiovascular       Pulmonary                     Anesthesia Plan      History & Physical Review  History and physical reviewed and following examination; no interval change.    ASA Status:  3 .    NPO Status:  > 8 hours    Plan for General and ETT with Intravenous induction. Maintenance will be Balanced.    PONV prophylaxis:  Ondansetron (or other 5HT-3) and Dexamethasone or Solumedrol  Additional equipment: Videolaryngoscope      Postoperative Care  Postoperative pain management:  IV analgesics.      Consents  Anesthetic plan, risks, benefits and alternatives discussed with:  Patient and Spouse..        Procedure: Procedure(s):  ANESTHESIA OUT OF OR CATH LAB ABLATION ADULT  Preop diagnosis: AFIB    Allergies   Allergen Reactions     No Known Drug Allergies      Past Medical History:   Diagnosis Date     Atrial fibrillation (H)     persistent     Essential hypertension, benign      H/O ETOH abuse      Herpes simplex without mention of complication      Hyperlipidemia      Obesity      Tobacco use disorder     dced 6/2004     Past Surgical History:   Procedure Laterality Date     C NONSPECIFIC PROCEDURE   1997    achilles tendon     C NONSPECIFIC PROCEDURE      knees, arthroscopy     C NONSPECIFIC PROCEDURE      basal cell skin ca, nose     C NONSPECIFIC PROCEDURE      flex sig; colonoscopy due 3/2008     C NONSPECIFIC PROCEDURE  2012    cardioversion for atr fib     HERNIORRHAPHY UMBILICAL  8/20/2012    Procedure: HERNIORRHAPHY UMBILICAL;  UMBILICAL HERNIA REPAIR;  Surgeon: Ra Crocker MD;  [facility]: [facility]     ORTHOPEDIC SURGERY       Prior to Admission medications    Medication Sig Start Date End Date Taking? Authorizing Provider   metoprolol (LOPRESSOR) 50 MG tablet Take 2 tablets (100 mg) by mouth 2 times daily 10/12/17   Johnny Kay MD   losartan (COZAAR) 100 MG tablet Take 1 tablet by mouth daily 10/11/17   Johnny Kay MD   rivaroxaban ANTICOAGULANT (XARELTO) 20 MG TABS tablet Take 1 tablet (20 mg) by mouth daily (with dinner) AVIOD USE OF ALCOHOL WHILE USING MEDICATION 5/5/17   Johnny Kay MD   furosemide (LASIX) 20 MG tablet Take 2 tablets (40 mg) by mouth daily 3/9/17   Johnny Kay MD   potassium chloride SA (K-DUR/KLOR-CON M) 10 MEQ CR tablet Take 2 tablets (20 mEq) by mouth daily 3/9/17   Johnny Kay MD   acetaminophen (TYLENOL) 325 MG tablet Take 650 mg by mouth 2/13/17   Reported, Patient   senna (SENOKOT) 8.6 MG tablet Take 8.6 mg by mouth 2/16/17   Reported, Patient   ASPIRIN PO Take 81 mg by mouth    Reported, Patient   Cyanocobalamin (VITAMIN B 12 PO)     Reported, Patient   order for DME Equipment ordered: RESMED CPAP Mask type: Full face  Settings: 15    Reported, Patient   indomethacin (INDOCIN) 50 MG capsule Take 1 capsule (50 mg) by mouth 3 times daily as needed for moderate pain with food 5/31/16   Johnny Kay MD   allopurinol (ZYLOPRIM) 300 MG tablet  5/5/16   Reported, Patient   finasteride (PROPECIA) 1 MG tablet Take 1 mg by mouth daily.    Reported, Patient     Current Facility-Administered Medications Ordered in Epic   Medication Dose Route  Frequency Last Rate Last Dose     0.45% sodium chloride infusion   Intravenous Continuous         lidocaine (LMX4) cream   Topical Q1H PRN         lidocaine 1 % 1 mL  1 mL Other Q1H PRN         sodium chloride (PF) 0.9% PF flush 3 mL  3 mL Intracatheter Q1H PRN         sodium chloride (PF) 0.9% PF flush 3 mL  3 mL Intracatheter Q8H         No current Epic-ordered outpatient prescriptions on file.     Wt Readings from Last 1 Encounters:   11/06/17 130.2 kg (287 lb)     Temp Readings from Last 1 Encounters:   11/06/17 35.8  C (96.4  F) (Oral)     BP Readings from Last 6 Encounters:   11/06/17 (!) 155/99   10/27/17 130/80   10/12/17 (!) 148/102   03/09/17 130/86   02/25/17 118/78   02/03/17 130/80     Pulse Readings from Last 4 Encounters:   11/06/17 69   10/27/17 72   10/12/17 88   03/09/17 99     Resp Readings from Last 1 Encounters:   11/06/17 18     SpO2 Readings from Last 1 Encounters:   11/06/17 96%     Recent Labs   Lab Test  10/26/17   0849  10/12/17   0814   NA  142  141   POTASSIUM  4.2  3.7   CHLORIDE  104  103   CO2  30  31   ANIONGAP  8  7   GLC  113*  136*   BUN  29  22   CR  1.35*  1.10   ASHELY  8.7  8.7     Recent Labs   Lab Test  02/03/17   0939 04/28/16 12/23/14   1527   WBC   --   6.8  6.8   HGB  16.2  15.7  15.2   PLT   --   299  217     Recent Labs   Lab Test  08/16/12   0951   INR  0.95      RECENT LABS:   ECG:   ECHO:   CXR:                      .

## 2017-11-07 VITALS
TEMPERATURE: 96.5 F | DIASTOLIC BLOOD PRESSURE: 89 MMHG | WEIGHT: 290.5 LBS | BODY MASS INDEX: 46.69 KG/M2 | HEIGHT: 66 IN | HEART RATE: 69 BPM | SYSTOLIC BLOOD PRESSURE: 135 MMHG | OXYGEN SATURATION: 95 % | RESPIRATION RATE: 18 BRPM

## 2017-11-07 LAB
KCT BLD-ACNC: 160 SEC (ref 105–167)
KCT BLD-ACNC: 266 SEC (ref 105–167)
KCT BLD-ACNC: 316 SEC (ref 105–167)

## 2017-11-07 PROCEDURE — 40000065 ZZH STATISTIC EKG NON-CHARGEABLE

## 2017-11-07 PROCEDURE — A9270 NON-COVERED ITEM OR SERVICE: HCPCS | Mod: GY | Performed by: INTERNAL MEDICINE

## 2017-11-07 PROCEDURE — 25000128 H RX IP 250 OP 636: Performed by: PHYSICIAN ASSISTANT

## 2017-11-07 PROCEDURE — 93005 ELECTROCARDIOGRAM TRACING: CPT

## 2017-11-07 PROCEDURE — 25000132 ZZH RX MED GY IP 250 OP 250 PS 637: Mod: GY | Performed by: INTERNAL MEDICINE

## 2017-11-07 PROCEDURE — 99214 OFFICE O/P EST MOD 30 MIN: CPT | Performed by: PHYSICIAN ASSISTANT

## 2017-11-07 PROCEDURE — 40000934 ZZH STATISTIC OUTPATIENT (NON-OBS) DAY

## 2017-11-07 RX ORDER — CALCIUM CARBONATE 500 MG/1
500 TABLET, CHEWABLE ORAL DAILY PRN
Status: DISCONTINUED | OUTPATIENT
Start: 2017-11-07 | End: 2017-11-07 | Stop reason: HOSPADM

## 2017-11-07 RX ORDER — METOPROLOL TARTRATE 50 MG
100 TABLET ORAL 2 TIMES DAILY
Qty: 60 TABLET | Refills: 4 | Status: SHIPPED | OUTPATIENT
Start: 2017-11-07 | End: 2017-11-13

## 2017-11-07 RX ORDER — AMLODIPINE BESYLATE 5 MG/1
5 TABLET ORAL 2 TIMES DAILY
Qty: 60 TABLET | Refills: 0 | Status: SHIPPED | OUTPATIENT
Start: 2017-11-07 | End: 2017-11-21

## 2017-11-07 RX ORDER — FUROSEMIDE 10 MG/ML
20 INJECTION INTRAMUSCULAR; INTRAVENOUS ONCE
Status: COMPLETED | OUTPATIENT
Start: 2017-11-07 | End: 2017-11-07

## 2017-11-07 RX ORDER — AMIODARONE HYDROCHLORIDE 200 MG/1
200 TABLET ORAL 2 TIMES DAILY
Qty: 28 TABLET | Refills: 0 | Status: SHIPPED | OUTPATIENT
Start: 2017-11-07 | End: 2017-11-21

## 2017-11-07 RX ADMIN — POTASSIUM CHLORIDE 20 MEQ: 1500 TABLET, EXTENDED RELEASE ORAL at 08:43

## 2017-11-07 RX ADMIN — LOSARTAN POTASSIUM 100 MG: 100 TABLET, FILM COATED ORAL at 08:44

## 2017-11-07 RX ADMIN — FUROSEMIDE 20 MG: 10 INJECTION, SOLUTION INTRAVENOUS at 08:44

## 2017-11-07 RX ADMIN — AMLODIPINE BESYLATE 5 MG: 5 TABLET ORAL at 08:43

## 2017-11-07 RX ADMIN — ACETAMINOPHEN 650 MG: 325 TABLET, FILM COATED ORAL at 06:41

## 2017-11-07 RX ADMIN — ALLOPURINOL 300 MG: 300 TABLET ORAL at 08:44

## 2017-11-07 RX ADMIN — Medication 2 LOZENGE: at 10:55

## 2017-11-07 RX ADMIN — AMIODARONE HYDROCHLORIDE 200 MG: 200 TABLET ORAL at 08:43

## 2017-11-07 RX ADMIN — Medication 2 LOZENGE: at 08:42

## 2017-11-07 NOTE — DISCHARGE INSTRUCTIONS
A Fib Ablation Discharge Instructions    After you go home:    Have an adult stay with you until tomorrow.    You may eat your normal diet, unless your doctor tells you otherwise.    Relax and take it easy for 3 days.        For 24-48 hours (due to the sedation you received):    DO NOT DRIVE FOR 2 DAYS!     Do NOT make any important or legal decisions.    Do NOT drive or operate machines at home or at work.    Do NOT drink alcohol.    Care of Puncture Site:    Check the puncture sites every 1-2 hours while awake.    For 2-3 days, when you cough, sneeze, laugh or move your bowels, hold your hand over the puncture sites and press firmly.    Remove the band aids after 24 hours. If there is minor oozing, apply another band aid and remove it after 12 hours.    It is normal to have a small bruise or pea size lump at the sites.    You may shower. Do NOT take a bath, or use a hot tub or pool until groin site heals, which may take up to a week.  Do NOT scrub the site. Do not use lotion or powder near the puncture site.    Activity:    Do NOT lift, push or pull more than 10 pounds for 3 days.    NO repetitive motions such as loading  or vacuuming.     Bleeding:    If you start bleeding from the groin site, lie down flat and press firmly on the site for 10 minutes or until bleeding stops.     Once bleeding stops, lay flat for 2 hours.    Call your A Fib nurse if bleeding does not stop or after hours will need to go to ER.       Go to ER or Call 911 right away if you have heavy bleeding or bleeding that does not stop.    Medications:    STOP lopressor (metoprolol tartrate).    START amlodipine 5 mg twice a day for your BP    START amiodarone 200 mg.  Take 1 tablet twice daily x 2 weeks, then we'll decrease it to 200 mg daily x 2-3 months. This is to try and keep your heart in normal rhythm while you're healing    CONTINUE Xarelto 20 mg daily without stopping    Take an EXTRA 20 mg of furosemide (Lasix) today at noon.  Tomorrow (Wednesday) take 40 mg in AM like normal and take another 20 mg at noon    CONTINUE potassium like normal    If you have pain, you may take Advil (ibuprofen) or Tylenol (acetaminophen).    Call the A Fib RN if:    Chest pain not relieved by tylenol or ibuprofen    Difficulty swallowing and/or coughing up blood    Shortness of breath    Increased pain or a large or growing hard lump around the site.    Groin site is red, swollen, hot or tender.    Blood or fluid is draining from the groin site.    You have chills or a fever greater than 101 F (38 C).    Your leg feels numb, cool or changes color.    If groin pain is not relieved by Tylenol or Ibuprofen.    Recurrent irregular or fast heart rate lasting over 2-3 hours.    Any questions or concerns.    Heart rhythms:  You may have some irregular heartbeats. These feel very strong. They may make you feel that the A Fib is going to start again.  Give it time. The irregular beats should occur less often.    Follow Up Appointments:    11/13/17 Monday at 8:00 with Crystal LIMON         12/15/17 Friday at 1:15 with Dr. Day     [facility]: A Fib clinic RN's Mervat Hidalgo [phone] (Mon-Fri, 8:00-5:00)                                                                                                      [phone] (7 days a week) after hours for on call Cardiologist.

## 2017-11-07 NOTE — PLAN OF CARE
Problem: Patient Care Overview  Goal: Discharge Needs Assessment  Outcome: Improving  Patient A & O x 4.  Off bedrest and was able to ambulate in halls and room.  Both groin sites are intact with no changes or bleeding.  His right groin has a tegaderm that has a small amount of dark blood unchanged.  Left groin has 2x2 that is CDI.  Doppler to bilateral LE's.  Telemetry is SR.  All neuros are intact.  Palmer removed and is now DTV.

## 2017-11-07 NOTE — PLAN OF CARE
Problem: Patient Care Overview  Goal: Plan of Care/Patient Progress Review  Outcome: Adequate for Discharge Date Met:  11/07/17  Groin and subclavian sites CDI.  CMS intact.  Tele shows sinus rhythm.  PT is alert and oriented X4.  Patient discharged to home by wheelchair at 11:49 AM 11/07/17.  Medication regimen and new medications discussed with patient and patient verbalizes understanding.  Diet and activity and wound care discussed with patient.  Upcoming appointments reviewed.  No questions at this time.  Patient has urinated.  Bowel sounds positive.  Pain well managed on oral analgesics.  Denies nausea after eating.

## 2017-11-07 NOTE — PLAN OF CARE
Right groin site unchanged from arrival to unit. Left groin site CDI. Pulses present w/doppler. Tele- SR. VSS on 1L O2. Denies pain. Palmer patent and draining. Bedrest until 2030 tonight.

## 2017-11-07 NOTE — PLAN OF CARE
Problem: Patient Care Overview  Goal: Discharge Needs Assessment  Outcome: No Change  Patient is A & O x 4.  Telemetry is SR.  Left groin has surrounding ecchymosis purple in color.  The 2x2 is CDI.  No firm hematoma in the skin.  His right groin also has ecchymosis around the site but less.  The tegaderm at this site is the same with small amount of old blood underneath.  He has been up to urinate but is only able to void small amounts and it is burning.  Encouraged to drink more water.  He did have lasix during his procedure and 950cc of lorrie urine was emptied from the guardado before this was discontinued.

## 2017-11-07 NOTE — PROGRESS NOTES
[facility]    EP Progress Note    Date of Service (when I saw the patient): 11/07/2017     Assessment & Plan   Mike Schultz is a 67 year old male with h/o HTN, BOWEN and obesity and persistent AFib. Initially dx'd in 2012, and was placed on flecainide after DCCV.  He did well until 12/2016 when he was noted to be back in AFib with c/o fatigue and less energy. He developed fluid retention and Dr. Day was concerned about loss of AV synchrony and therefore he was admitted on 11/6/2017 for PVI.    1. Persistent Atrial Fibrillation -   * Now s/p 4 PVs and SVC with empiric ablation of CTI.  * Cardiac MRI prior 10/31/17 showed EF 51% and no clots    * Tele thus far with SR in 70s.  * Amio started with loading dose    * Received 40 mg IV Lasix during procedure. Palmer out and voiding with dysuria. I/Os are negative but weight is up 1.6 kg. PTA on Lasix 40 mg daily. BMP 11/6 looked great.   * On exam, edema MUCH improved c/w Dr. Day's note. JVD and HJR noted. Soft bibasilar crackles.        PLAN:  A) Amiodarone 200 mg BID x 2 weeks, then 200 mg daily x 2-3 months  B) Continue Xarelto (CHADSVASc 3 =  HTN, age, MRI showing scar c/w Cx MI)  C) DC metoprolol  D) FU made 11/13 @ 8 AM with EKG. Dr. Day 12/15 @ 1:15 PM  E) Continue to avoid EtOH  F) Will give 20 mg IV Lasix x 1 now and send home on 40/20 oral Lasix x 2 days.     2. HTN -   * PTA on lopressor 100 mg BID, losartan 100 mg daily, Lasix 40 mg daily.  * Lopressor DCd by Dr. Day  * Amlodipine 5 mg BID started for BP control.  * BPs 110-140s.     PLAN:  A) Will have to watch LE edema on amlodipine given venous stasis  B) Continue to follow as OP    3. AS -   * Mild on MRI    4. Edema -   * Dr. Day thinks in part due to AV dyssynchrony, and noted pitting edema to knees on exam 10/27 but clear lungs and no JVD  * Diastolic dysfunction and venous stasis contributing  * PTA on Lasix 40 mg daily - BMP looked good 11/6  * MUCH improved on exam per  pt     PLAN:  A) Watch LE edema on high dose amlodipine    Natalie Campos PA-C    Interval History   Notes mild pleuritic chest discomfort. Has voided, hematuria x 1 (first), now improved. Dysuria noted.  No trouble swallowing  Physical Exam   Temp: 97.1  F (36.2  C) Temp src: Oral BP: 112/60 Pulse: 69 Heart Rate: 77 Resp: 16 SpO2: 91 % O2 Device: None (Room air) Oxygen Delivery: 1 LPM  Vitals:    11/06/17 1120 11/07/17 0628   Weight: 130.2 kg (287 lb) 131.8 kg (290 lb 8 oz)     Vital Signs with Ranges  Temp:  [96.4  F (35.8  C)-98.7  F (37.1  C)] 97.1  F (36.2  C)  Pulse:  [69] 69  Heart Rate:  [63-81] 77  Resp:  [10-29] 16  BP: (112-162)/() 112/60  SpO2:  [90 %-97 %] 91 %  I/O last 3 completed shifts:  In: 1700 [P.O.:800; I.V.:900]  Out: 1900 [Urine:1900]    Telemetry: NSR 70s    Constitutional: awake, alert, cooperative, no apparent distress, and appears stated age  Respiratory: Bibasilar crackles  Cardiovascular: Regular. No RG. AS murmur noted. Mild JVD and HJR noted  Musculoskeletal: Trace LE edema bilaterally. R femoral access site ecchymotic with small (<1 cm) hematoma. L femoral access site with less ecchymosis and no hematoma. DPs intact.     Medications        allopurinol  300 mg Oral Daily     furosemide  40 mg Oral Daily     potassium chloride SA  20 mEq Oral Daily     rivaroxaban ANTICOAGULANT  20 mg Oral Daily with supper     losartan  100 mg Oral Daily     sodium chloride (PF)  3 mL Intracatheter Q8H     amLODIPine  5 mg Oral BID     amiodarone  200 mg Oral BID       Data   EKG PENDING.  .  Results for orders placed or performed during the hospital encounter of 11/06/17 (from the past 24 hour(s))   EKG 12-lead, tracing only   Result Value Ref Range    Interpretation ECG Click View Image link to view waveform and result    Basic metabolic panel   Result Value Ref Range    Sodium 141 133 - 144 mmol/L    Potassium 3.9 3.4 - 5.3 mmol/L    Chloride 106 94 - 109 mmol/L    Carbon Dioxide 31  20 - 32 mmol/L    Anion Gap 4 3 - 14 mmol/L    Glucose 113 (H) 70 - 99 mg/dL    Urea Nitrogen 20 7 - 30 mg/dL    Creatinine 1.00 0.66 - 1.25 mg/dL    GFR Estimate 75 >60 mL/min/1.7m2    GFR Estimate If Black >90 >60 mL/min/1.7m2    Calcium 8.6 8.5 - 10.1 mg/dL   CBC with platelets   Result Value Ref Range    WBC 10.5 4.0 - 11.0 10e9/L    RBC Count 4.55 4.4 - 5.9 10e12/L    Hemoglobin 16.2 13.3 - 17.7 g/dL    Hematocrit 46.9 40.0 - 53.0 %     (H) 78 - 100 fl    MCH 35.6 (H) 26.5 - 33.0 pg    MCHC 34.5 31.5 - 36.5 g/dL    RDW 13.3 10.0 - 15.0 %    Platelet Count 190 150 - 450 10e9/L   INR   Result Value Ref Range    INR 1.66 (H) 0.86 - 1.14   EP Ablation focal afib    Narrative    PROCEDURES PERFORMED:  1. Wide circumferential pulmonary vein isolation.  2. Superior vena cava (SVC) isolation  3. Three-dimensional CARTO mapping.  4. Comprehensive electrophysiology (EP) study with arrhythmia  induction.  5. Empiric ablation of the cavotricuspid isthmus (CTI)  6. Left atrial recording and pacing via the CS.  7. Transseptal access.  8. Cardiac fluoroscopy.  9. Intracardiac echocardiogram (ICE)    INDICATION: Symptomatic chronic atrial fibrillation.    HISTORY OF PRESENT ILLNESS: This is a 67 year-old patient with  symptomatic chronic atrial fibrillation refractory to flecainide.  Patient has been on Xarelto and recent MRI shows no clots in the [place].  Patient was referred for EP study and ablation. Extensive discussion  was held regarding risks and benefits including but not limited to  vascular injury, cardiac perforation, phrenic nerve injury and stroke.  Patient understood and accepted these risks.     METHOD: The patient was intubated and received general anesthesia. A  temperature probe was placed inside the esophagus and Palmer catheter  was inserted.    The patient was then prepped and draped in the usual manner. 1%  lidocaine was infiltrated into the right and left femoral area. Two  SL1 sheaths were then  "placed in the right femoral vein and an 6, and  8.5-Greek sheath placed in the left femoral vein via the Seldinger's  technique and ultrasound guidance.  A deflectable decapolar catheter  was then placed in the CS for left atrial recording and pacing.  An  ICE catheter was placed in the 8.5-Greek sheath. Multiple views of  the left atrium and atrial appendage were obtained from right atrium  and ventricle. There was no evidence of clots noted in either  chambers. Heparin boluses were given to keep ACT above 300 seconds.    A HeartSpan needle was then introduced into one of the SL1 sheaths.  Under both ICE and fluoroscopic guidance, the HeartSpan needle was  used to cross the septum after observing tenting of the atrial septum.  Agitated saline was injected into the HeartSpan needle.  After  observing \"bubbles\" in the left atrium (LA), the dilator was then  advanced over the needle along with the sheath.  The needle and  dilator were then removed. The sheath was then aspirated and was  irrigated continuously with heparinized saline at 100 mL per hour.  given. The second transseptal access was performed in a similar  fashion. The Lasso catheter was then placed in one of the SL1 sheaths  and the JJ curve irrigated tip, SmartTouch ablation catheter was  placed in the other one. Both SL-1 sheaths were then pulled back into  the right atrium    The geometry of the pulmonary veins along with the body of the LA were  obtained using the Lasso catheter and CARTO mapping system. It was   compared to the cardiac image that was obtained prior to the  procedure. Afterward, the Lasso catheter was placed in each of the  respective pulmonary veins. Ablation was then performed using a  Stockert generator. The ablation catheter was moved at each 15 - 20  seconds interval at 40 Martino and a minimum of 5 grams of force. The  ablation was stopped immediately if there is a rise of 0.5 Celsius  degree. Burst and atrial extrastimuli were " performed. Lasso catheter  was then placed in the SVC searching for any potentials.  Before  ablation the anterior aspect of the right pulmonary veins and SVC  phrenic nerve mapping was performed by high output pacing and area of  phrenic capturing was tagged by CARTO. Frequent fluoroscopy of the  diaphragm was also performed during ablation of the right anterior  pulmonary veins and SVC.  Lasix (40 mg) and Protamine (50 mg) were  given intravenously.  ICE shows no pericardial effusion. Catheters and  sheaths were then removed. Hemostasis was achieved by direct manual  pressure. The patient was extubated and was then transferred back to  the PACU in stable condition.    CONDUCTION INTERVALS:  1. A-H:  100 ms , H-V: 58 ms  2. AV Wenckebach: 450 ms  3. AV enrrique ERP: 600/410 ms  4. VA ERP: no VA conduction when paced at 600 ms    ARRHYTHMIA SUMMARY: The patient presented in atrial fibrillation. Wide  circumferential pulmonary vein isolation was performed. DC  cardioversion was then performed.  Atrial burst pacing and  extrastimuli up to triples and decremented down to 200 ms were then  performed next. there was no inducible sustained atrial  tachyarrhythmias except several beats run of AT.    ABLATION SUMMARY: Approximately 27 applications of radiofrequency  ablation were targeted at the antra of the pulmonary veins. Complete  pulmonary vein isolation was demonstrated with the absence of  pulmonary vein potential as documented by the Lasso catheter and was  mapped with a 3-dimensional CARTO system. Another 5 applications were  targeted at the SVC after phrenic mapping as described above.  10  applications were targeted at the CTI with isthmus block demonstrated.    FLUOROSCOPY TIME (minutes): 5.    COMPLICATIONS: None.    CONCLUSIONS:  1. Uneventful wide circumferential pulmonary vein and SVC isolation  (see arrhythmia and ablation summary)  2. Empiric ablation of CTI  3. Slightly abnormal atrioventricular function  without evidence of  dual AVN physiology likely reflection of high dose of Metoprolol.  4. Stop Metoprolol and start Norvasc for HTN  5. Start Amiodarone 200 mg bid for 2 weeks then 200 mg daily for 2-3  months.  6. Resume Xarelto.     MD KIKE Chandler MD

## 2017-11-07 NOTE — PROGRESS NOTES
I spoke to patient about his discharge instructions and future appointments and phone numbers. I strongly encouraged him to call anytime with questions or concerns. I reminded him not to drive for the next two days and to take it easy for the same time period.I reminded him to call with if he has a fever,continues to urinary burning or increased ankle swelling or problems with his groins. He told me he will be eliminating all alcohol and I commended him for this. His questions were answered to his satisfaction. PRABHA Sauer

## 2017-11-10 ENCOUNTER — TELEPHONE (OUTPATIENT)
Dept: CARDIOLOGY | Facility: CLINIC | Age: 67
End: 2017-11-10

## 2017-11-10 LAB
INTERPRETATION ECG - MUSE: NORMAL
INTERPRETATION ECG - MUSE: NORMAL

## 2017-11-10 NOTE — TELEPHONE ENCOUNTER
Called patient after updating Crystal on groin site and it is okay to wait until follow up appt on Monday.  Instructed patient to take it easy over the next couple of days to not cause further strain on area.  If area should become larger over the next couple of days he should go into ED for evaluation.  Patient agreed with plan.  PRABHA Palomo

## 2017-11-10 NOTE — TELEPHONE ENCOUNTER
Patient called to report that his bilateral groin sites feel larger than at discharge.  Reports lump is approximately 1/2 long and dime size in diameter. Denies pain with ambulation. Tender only when palpated.  Patient s/p afib ablation on 11/6 has follow up appt on 11/13 with TOBI Rivas. Will updated Crystal with above. PRABHA Palomo

## 2017-11-13 ENCOUNTER — TELEPHONE (OUTPATIENT)
Dept: CARDIOLOGY | Facility: CLINIC | Age: 67
End: 2017-11-13

## 2017-11-13 ENCOUNTER — OFFICE VISIT (OUTPATIENT)
Dept: CARDIOLOGY | Facility: CLINIC | Age: 67
End: 2017-11-13
Payer: COMMERCIAL

## 2017-11-13 VITALS
HEART RATE: 112 BPM | SYSTOLIC BLOOD PRESSURE: 145 MMHG | BODY MASS INDEX: 45.96 KG/M2 | DIASTOLIC BLOOD PRESSURE: 87 MMHG | WEIGHT: 286 LBS | HEIGHT: 66 IN

## 2017-11-13 DIAGNOSIS — I10 BENIGN ESSENTIAL HYPERTENSION: ICD-10-CM

## 2017-11-13 DIAGNOSIS — I48.19 PERSISTENT ATRIAL FIBRILLATION (H): Primary | ICD-10-CM

## 2017-11-13 PROCEDURE — 99214 OFFICE O/P EST MOD 30 MIN: CPT | Performed by: PHYSICIAN ASSISTANT

## 2017-11-13 PROCEDURE — 93000 ELECTROCARDIOGRAM COMPLETE: CPT | Performed by: PHYSICIAN ASSISTANT

## 2017-11-13 RX ORDER — AMIODARONE HYDROCHLORIDE 200 MG/1
200 TABLET ORAL DAILY
Qty: 60 TABLET | Refills: 3 | Status: SHIPPED | OUTPATIENT
Start: 2017-11-22 | End: 2018-04-19

## 2017-11-13 RX ORDER — METOPROLOL TARTRATE 50 MG
50 TABLET ORAL 2 TIMES DAILY
Start: 2017-11-13 | End: 2018-03-02

## 2017-11-13 NOTE — MR AVS SNAPSHOT
"              After Visit Summary   11/13/2017    Mike Schultz    MRN: [id number]           Patient Information     Date Of Birth          1950        Visit Information        Provider Department      11/13/2017 8:00 AM Natalie Campos PA-C [facility]        Today's Diagnoses     Persistent atrial fibrillation (H)    -  1    Benign essential hypertension          Care Instructions    1. EKG today revealed you are currently in AFib with heart rate ~ 90 bpm    2. As we discussed, this is NOT uncommon right after the surgery.    3. RESTART metoprolol tartrate 50 mg twice a day.    4. Continue \"loading\" amiodarone 200 mg twice daily until 11/21, then down to 200 mg daily starting 11/22. I have sent a new Rx for this    5. See me in ~ 2 weeks with repeat EKG to see if we should get cardioversion ordered.  CALL if any issues beforehand!!!!  [phone] (Bill, Gwen and Pat)    6. Continue to abstain from alcohol!              Follow-ups after your visit        Additional Services     Follow-Up with Cardiac Advanced Practice Provider                 Your next 10 appointments already scheduled     Dec 15, 2017  1:15 PM CST   [facility] EP RETURN with Fady Kent MD   [facility]   [place] ([facility])    [address] [place] [address] [phone]              Future tests that were ordered for you today     Open Future Orders        Priority Expected Expires Ordered    EKG 12-lead complete w/read - Clinics (to be scheduled) Routine 11/27/2017 11/13/2018 11/13/2017    Follow-Up with Cardiac Advanced Practice Provider Routine 11/27/2017 11/13/2018 11/13/2017            Who to contact     If you have questions or need follow up information about today's clinic visit or your schedule please contact [facility] directly at [phone].  Normal or non-critical lab and imaging " "results will be communicated to you by MyChart, letter or phone within 4 business days after the clinic has received the results. If you do not hear from us within 7 days, please contact the clinic through [email or web address] or phone. If you have a critical or abnormal lab result, we will notify you by phone as soon as possible.  Submit refill requests through [email or web address] or call your pharmacy and they will forward the refill request to us. Please allow 3 business days for your refill to be completed.          Additional Information About Your Visit        [email or web address] Information     [email or web address] gives you secure access to your electronic health record. If you see a primary care provider, you can also send messages to your care team and make appointments. If you have questions, please call your primary care clinic.  If you do not have a primary care provider, please call [phone] and they will assist you.        Care EveryWhere ID     This is your Care EveryWhere ID. This could be used by other organizations to access your [facility] medical records  [id number]        Your Vitals Were     Pulse Height BMI (Body Mass Index)             112 1.676 m (5' 6\") 46.16 kg/m2          Blood Pressure from Last 3 Encounters:   11/13/17 145/87   11/07/17 135/89   10/27/17 130/80    Weight from Last 3 Encounters:   11/13/17 129.7 kg (286 lb)   11/07/17 131.8 kg (290 lb 8 oz)   10/27/17 130.6 kg (288 lb)              We Performed the Following     EKG 12-lead complete w/read - Clinics (performed today)          Today's Medication Changes          These changes are accurate as of: 11/13/17  8:31 AM.  If you have any questions, ask your nurse or doctor.               Start taking these medicines.        Dose/Directions    metoprolol 50 MG tablet   Commonly known as:  LOPRESSOR   Used for:  Persistent atrial fibrillation (H)   Started by:  Natalie Campos PA-C        Dose:  50 mg   Take 1 tablet (50 mg) by mouth 2 times daily   Refills:  0    "      These medicines have changed or have updated prescriptions.        Dose/Directions    * amiodarone 200 MG tablet   Commonly known as:  PACERONE/CODARONE   This may have changed:  Another medication with the same name was added. Make sure you understand how and when to take each.   Used for:  Atrial fibrillation, unspecified type (H)        Dose:  200 mg   Take 1 tablet (200 mg) by mouth 2 times daily for 14 days   Quantity:  28 tablet   Refills:  0       * amiodarone 200 MG tablet   Commonly known as:  PACERONE/CODARONE   This may have changed:  You were already taking a medication with the same name, and this prescription was added. Make sure you understand how and when to take each.   Used for:  Persistent atrial fibrillation (H)   Changed by:  Natalie Campos PA-C        Dose:  200 mg   Start taking on:  11/22/2017   Take 1 tablet (200 mg) by mouth daily starting 11/22   Quantity:  60 tablet   Refills:  3       * Notice:  This list has 2 medication(s) that are the same as other medications prescribed for you. Read the directions carefully, and ask your doctor or other care provider to review them with you.         Where to get your medicines      These medications were sent to [id number] - [place] [place] [facility] [address] [address] [facility]  [address] [facility] [address]     Phone:  [phone]     amiodarone 200 MG tablet         Some of these will need a paper prescription and others can be bought over the counter.  Ask your nurse if you have questions.     You don't need a prescription for these medications     metoprolol 50 MG tablet                Primary Care Provider Office Phone # Fax #    Johnny Kay [phone]       [address] [address]        Equal Access to Services     SABAS STOKES AH: Van Dean, faustino fine, qaybta kaalmalcolm dalton, kaykay olea. So [facility]  [phone].    ATENCIÓN: Si alpa wilson, tiene a hightower disposición servicios gratuitos de asistencia lingüística. Salty quinn [phone].    We comply with applicable federal civil rights laws and [place] laws. We do not discriminate on the basis of race, color, national origin, age, disability, sex, sexual orientation, or gender identity.            Thank you!     Thank you for choosing [facility]  for your care. Our goal is always to provide you with excellent care. Hearing back from our patients is one way we can continue to improve our services. Please take a few minutes to complete the written survey that you may receive in the mail after your visit with us. Thank you!             Your Updated Medication List - Protect others around you: Learn how to safely use, store and throw away your medicines at www.disposemymeds.org.          This list is accurate as of: 11/13/17  8:31 AM.  Always use your most recent med list.                   Brand Name Dispense Instructions for use Diagnosis    acetaminophen 325 MG tablet    TYLENOL     Take 650 mg by mouth        allopurinol 300 MG tablet    ZYLOPRIM      Essential hypertension with goal blood pressure less than 140/90       * amiodarone 200 MG tablet    PACERONE/CODARONE    28 tablet    Take 1 tablet (200 mg) by mouth 2 times daily for 14 days    Atrial fibrillation, unspecified type (H)       * amiodarone 200 MG tablet   Start taking on:  11/22/2017    PACERONE/CODARONE    60 tablet    Take 1 tablet (200 mg) by mouth daily starting 11/22    Persistent atrial fibrillation (H)       amLODIPine 5 MG tablet    NORVASC    60 tablet    Take 1 tablet (5 mg) by mouth 2 times daily    Essential hypertension       furosemide 20 MG tablet    LASIX    180 tablet    Take 2 tablets (40 mg) by mouth daily    Peripheral edema       indomethacin 50 MG capsule    INDOCIN    30 capsule    Take 1 capsule (50 mg) by mouth 3 times daily as needed  for moderate pain with food    Gout of foot, unspecified cause, unspecified chronicity, unspecified laterality       losartan 100 MG tablet    COZAAR    90 tablet    Take 1 tablet by mouth daily    Essential hypertension with goal blood pressure less than 140/90       metoprolol 50 MG tablet    LOPRESSOR     Take 1 tablet (50 mg) by mouth 2 times daily    Persistent atrial fibrillation (H)       order for DME      Equipment ordered: RESMED CPAP Mask type: Full face  Settings: 15        potassium chloride SA 10 MEQ CR tablet    K-DUR/KLOR-CON M    180 tablet    Take 2 tablets (20 mEq) by mouth daily    Peripheral edema       PROPECIA 1 MG tablet   Generic drug:  finasteride      Take 1 mg by mouth daily.        rivaroxaban ANTICOAGULANT 20 MG Tabs tablet    XARELTO    90 tablet    Take 1 tablet (20 mg) by mouth daily (with dinner) AVIOD USE OF ALCOHOL WHILE USING MEDICATION    Persistent atrial fibrillation (H)       senna 8.6 MG tablet    SENOKOT     Take 8.6 mg by mouth        VITAMIN B 12 PO           * Notice:  This list has 2 medication(s) that are the same as other medications prescribed for you. Read the directions carefully, and ask your doctor or other care provider to review them with you.

## 2017-11-13 NOTE — TELEPHONE ENCOUNTER
Pt called and stated that he had just seen Crystal Zhuy and forgot to ask if he could workout with resistance machines at [employer].  Pt states that no free weights just the machines that you can dial in the resistance. Will message Crystal with her opinion. Miguel Ángel

## 2017-11-13 NOTE — PATIENT INSTRUCTIONS
"1. EKG today revealed you are currently in AFib with heart rate ~ 90 bpm    2. As we discussed, this is NOT uncommon right after the surgery.    3. RESTART metoprolol tartrate 50 mg twice a day.    4. Continue \"loading\" amiodarone 200 mg twice daily until 11/21, then down to 200 mg daily starting 11/22. I have sent a new Rx for this    5. See me in ~ 2 weeks with repeat EKG to see if we should get cardioversion ordered.  CALL if any issues beforehand!!!!  [phone] (Gwen Saldivar and Mervat)    6. Continue to abstain from alcohol!      "

## 2017-11-13 NOTE — LETTER
"11/13/2017    Johnny Kay MD  [address] [place] [address]    RE: Mike Schultz       Dear Colleague,    I had the pleasure of seeing Mike BRAXTON Schultz in the [facility].    HPI:   I had the pleasure of seeing Brayden today when he came for follow-up of his recent atrial fibrillation ablation. He is a pleasant 67-year-old who has a history of hypertension, obstructive sleep apnea, alcohol use and aortic stenosis. He was first diagnosed with atrial fibrillation back in 2012 and underwent DC cardioversion. He was placed on flecainide and did well until 12/2016, where he noted increasing fatigue and lack of energy as well as fluid retention. He met with Dr. Day, who recommended proceeding with attempt at establishing normal rhythm given the concern that dyssynchrony was causing quite a bit of his fluid retention.    Therefore, on 11/6/17, he underwent pulmonary vein isolation, SVC ablation and cavotricuspid isthmus ablation with Dr. Day. He was discharged the following day on amiodarone 200 mg b.i.d. (11/7 through 11/21), to be followed by amiodarone 200 mg daily for 2-3 months. He was continued on Zarontin for his CHADSVASc score of 3 (hypertension, age and cardiac MRI showing presence of scar concerning for previous myocardial infarction). Metoprolol tartrate 100 b.i.d. was discontinued and amlodipine 5 mg twice daily was added. He was given 2 days of extra Lasix. Of note, his edema was vastly improved following ablation.    He contacted our office just this past Friday noting that his bilateral groin sites seemed a bit \"morin.\" He did not have any significant discomfort and it was recommended he keep this appointment.    Overall, Brayden thinks is done well. He states that his breathing is much better, he has more energy and he is less diaphoretic and he was. He has noted some irregularity to his heartbeat over the last few days, and he is concerned he is back in " "atrial fibrillation.    He denies fever, chills, worsening of his lower extremity edema, orthopnea, palpitations, trouble swallowing or other concerns. He only has noted \"lumps\" at the groin site when he palpates them, otherwise he is unaware of any abnormality. He is ambulating without issues. He is urinating without issues.    Cardiac MRI 10/2017 showed an EF of 51%. There was mild hypokinesis in the basal inferolateral segment. This segment also had hyperenhancement consistent with prior myocardial infarction in the left circumflex.    EKG today which I overread showed atrial fibrillation at 94 bpm.    Assessment & Plan:    1. Persistant Atrial Fibrillation -   He is now s/p PVI by Dr. Day 11/6/17 and remains on a loading dose of Amiodarone (200 mg BID) x 2 weeks. He will then decreased to daily use, which Dr. Day expects to use for roughly 2-3 months. Metoprolol was discontinued secondary to concerns regarding bradycardia on amiodarone.     Unfortunately, he is now back in atrial fibrillation. His rate is relatively well controlled, and he is actually pretty asymptomatic at this point. At this time, I discussed with Dr. Day and we will    * reinitiate metoprolol tartrate at just 50 mg twice daily.    * He will come back to see me in one to 2 weeks we will repeat an EKG and potentially proceed with cardioversion. I explained that this is not uncommon following his procedure.     * He remains on anticoagulation with Xarelto and he will continue this.    2. HTN - metoprolol tartrate 100 mg twice daily was discontinued, and amlodipine 5 mg twice daily was ordered in an effort to control blood pressure. We noted we would have to watch lower extremity edema carefully given the use of amlodipine. BPs at home have been 120/80s.   * Tolerating amlodipine and will continue   * Restart metoprolol tartrate at 50 mg BID    He will see me in one to 2 weeks with an EKG at that time we will determine if he needs to proceed " with cardioversion. Otherwise, he will see Dr. Day 12/15/17.    Crystal Campos PA-C, MSPAS      Orders Placed This Encounter   Procedures     Follow-Up with Cardiac Advanced Practice Provider     EKG 12-lead complete w/read - Clinics (performed today)     EKG 12-lead complete w/read - Clinics (to be scheduled)     Orders Placed This Encounter   Medications     metoprolol (LOPRESSOR) 50 MG tablet     Sig: Take 1 tablet (50 mg) by mouth 2 times daily     amiodarone (PACERONE/CODARONE) 200 MG tablet     Sig: Take 1 tablet (200 mg) by mouth daily starting 11/22     Dispense:  60 tablet     Refill:  3     Pt will start after he finishes loading dose of 200 mg BID 11/7-11/21     Medications Discontinued During This Encounter   Medication Reason     metoprolol (LOPRESSOR) 50 MG tablet Stopped by Patient         Encounter Diagnoses   Name Primary?     Persistent atrial fibrillation (H) Yes     Benign essential hypertension        CURRENT MEDICATIONS:  Current Outpatient Prescriptions   Medication Sig Dispense Refill     metoprolol (LOPRESSOR) 50 MG tablet Take 1 tablet (50 mg) by mouth 2 times daily       [START ON 11/22/2017] amiodarone (PACERONE/CODARONE) 200 MG tablet Take 1 tablet (200 mg) by mouth daily starting 11/22 60 tablet 3     amiodarone (PACERONE/CODARONE) 200 MG tablet Take 1 tablet (200 mg) by mouth 2 times daily for 14 days 28 tablet 0     amLODIPine (NORVASC) 5 MG tablet Take 1 tablet (5 mg) by mouth 2 times daily 60 tablet 0     losartan (COZAAR) 100 MG tablet Take 1 tablet by mouth daily 90 tablet 1     rivaroxaban ANTICOAGULANT (XARELTO) 20 MG TABS tablet Take 1 tablet (20 mg) by mouth daily (with dinner) AVIOD USE OF ALCOHOL WHILE USING MEDICATION 90 tablet PRN     furosemide (LASIX) 20 MG tablet Take 2 tablets (40 mg) by mouth daily 180 tablet prn     potassium chloride SA (K-DUR/KLOR-CON M) 10 MEQ CR tablet Take 2 tablets (20 mEq) by mouth daily 180 tablet prn     acetaminophen (TYLENOL) 325 MG tablet  Take 650 mg by mouth       senna (SENOKOT) 8.6 MG tablet Take 8.6 mg by mouth       Cyanocobalamin (VITAMIN B 12 PO)        indomethacin (INDOCIN) 50 MG capsule Take 1 capsule (50 mg) by mouth 3 times daily as needed for moderate pain with food 30 capsule 1     allopurinol (ZYLOPRIM) 300 MG tablet        finasteride (PROPECIA) 1 MG tablet Take 1 mg by mouth daily.       [DISCONTINUED] metoprolol (LOPRESSOR) 50 MG tablet Take 2 tablets (100 mg) by mouth 2 times daily 60 tablet 4     order for DME Equipment ordered: RESMED CPAP Mask type: Full face  Settings: 15         ALLERGIES     Allergies   Allergen Reactions     No Known Drug Allergies        PAST MEDICAL HISTORY:  Past Medical History:   Diagnosis Date     Atrial fibrillation (H)     persistent     Essential hypertension, benign      H/O ETOH abuse      Herpes simplex without mention of complication      Hyperlipidemia      Obesity      S/P ablation of atrial fibrillation 2017     Tobacco use disorder     dced 2004       PAST SURGICAL HISTORY:  Past Surgical History:   Procedure Laterality Date     C NONSPECIFIC PROCEDURE      achilles tendon     C NONSPECIFIC PROCEDURE      knees, arthroscopy     C NONSPECIFIC PROCEDURE      basal cell skin ca, nose     C NONSPECIFIC PROCEDURE      flex sig; colonoscopy due 3/2008     C NONSPECIFIC PROCEDURE      cardioversion for atr fib     HERNIORRHAPHY UMBILICAL  2012    Procedure: HERNIORRHAPHY UMBILICAL;  UMBILICAL HERNIA REPAIR;  Surgeon: Ra Crocker MD;  Location: [facility]     ORTHOPEDIC SURGERY         FAMILY HISTORY:  Family History   Problem Relation Age of Onset     Family History Negative Mother      HEART DISEASE Father      decesased at 42 - [place]     HEART DISEASE Sister       [place]     HEART DISEASE Brother       [place]     HEART DISEASE Brother      CABG AT 49     Lipids Sister      Lipids Sister      Lipids Sister      Family History Negative Brother        SOCIAL  "HISTORY:  Social History     Social History     Marital status: Single     Spouse name: N/A     Number of children: N/A     Years of education: N/A     Social History Main Topics     Smoking status: Former Smoker     Packs/day: 1.00     Years: 10.00     Types: Cigarettes     Quit date: 8/20/2011     Smokeless tobacco: Never Used     Alcohol use No     Drug use: No     Sexual activity: Yes     Partners: Female     Other Topics Concern     None     Social History Narrative       Review of Systems:  Skin:  Negative     Eyes:  Positive for glasses (reading only)  ENT:  Negative    Respiratory:  Positive for dyspnea on exertion  Cardiovascular:  Negative for;chest pain Positive for;lightheadedness;edema  Gastroenterology: Negative    Genitourinary:  Negative    Musculoskeletal:  Negative    Neurologic:  Negative    Psychiatric:  Negative    Heme/Lymph/Imm:  Negative    Endocrine:  Negative      Physical Exam:  Vitals: /87  Pulse 112  Ht 1.676 m (5' 6\")  Wt 129.7 kg (286 lb)  BMI 46.16 kg/m2    Constitutional:  cooperative, alert and oriented, well developed, well nourished, in no acute distress obese      Skin:  warm and dry to the touch, no apparent skin lesions or masses noted        Head:  normocephalic, no masses or lesions        Eyes:  pupils equal and round, conjunctivae and lids unremarkable, sclera white, no xanthalasma, EOMS intact, no nystagmus        ENT:  no pallor or cyanosis, dentition good        Neck:           Chest:  normal breath sounds, clear to auscultation, normal A-P diameter, normal symmetry, normal respiratory excursion, no use of accessory muscles        Cardiac:   irregularly irregular rhythm   no presence of murmur            Abdomen:  abdomen soft, non-tender, BS normoactive, no mass, no HSM, no bruits obese      Vascular: pulses full and equal, no bruits auscultated                                      Extremities and Back:  no deformities, clubbing, cyanosis, erythema observed    "     Neurological:             Recent Lab Results:  LIPID RESULTS:  Lab Results   Component Value Date    CHOL 178 10/12/2017    HDL 50 10/12/2017    LDL 90 10/12/2017    TRIG 190 (H) 10/12/2017    CHOLHDLRATIO 3.8 03/16/2015       LIVER ENZYME RESULTS:  Lab Results   Component Value Date    AST 23 10/12/2017    ALT 40 10/12/2017       CBC RESULTS:  Lab Results   Component Value Date    WBC 10.5 11/06/2017    RBC 4.55 11/06/2017    HGB 16.2 11/06/2017    HCT 46.9 11/06/2017     (H) 11/06/2017    MCH 35.6 (H) 11/06/2017    MCHC 34.5 11/06/2017    RDW 13.3 11/06/2017     11/06/2017       BMP RESULTS:  Lab Results   Component Value Date     11/06/2017    POTASSIUM 3.9 11/06/2017    CHLORIDE 106 11/06/2017    CO2 31 11/06/2017    ANIONGAP 4 11/06/2017     (H) 11/06/2017    BUN 20 11/06/2017    CR 1.00 11/06/2017    GFRESTIMATED 75 11/06/2017    GFRESTBLACK >90 11/06/2017    ASHELY 8.6 11/06/2017        A1C RESULTS:  Lab Results   Component Value Date    A1C 5.7 10/12/2017       INR RESULTS:  Lab Results   Component Value Date    INR 1.66 (H) 11/06/2017    INR 0.95 08/16/2012     Thank you for allowing me to participate in the care of your patient.    Sincerely,     Natalie Campos PA-C     [facility]

## 2017-11-13 NOTE — PROGRESS NOTES
"HPI:   I had the pleasure of seeing Brayden today when he came for follow-up of his recent atrial fibrillation ablation. He is a pleasant 67-year-old who has a history of hypertension, obstructive sleep apnea, alcohol use and aortic stenosis. He was first diagnosed with atrial fibrillation back in 2012 and underwent DC cardioversion. He was placed on flecainide and did well until 12/2016, where he noted increasing fatigue and lack of energy as well as fluid retention. He met with Dr. Day, who recommended proceeding with attempt at establishing normal rhythm given the concern that dyssynchrony was causing quite a bit of his fluid retention.    Therefore, on 11/6/17, he underwent pulmonary vein isolation, SVC ablation and cavotricuspid isthmus ablation with Dr. Day. He was discharged the following day on amiodarone 200 mg b.i.d. (11/7 through 11/21), to be followed by amiodarone 200 mg daily for 2-3 months. He was continued on Zarontin for his CHADSVASc score of 3 (hypertension, age and cardiac MRI showing presence of scar concerning for previous myocardial infarction). Metoprolol tartrate 100 b.i.d. was discontinued and amlodipine 5 mg twice daily was added. He was given 2 days of extra Lasix. Of note, his edema was vastly improved following ablation.    He contacted our office just this past Friday noting that his bilateral groin sites seemed a bit \"morin.\" He did not have any significant discomfort and it was recommended he keep this appointment.    Overall, Brayden thinks is done well. He states that his breathing is much better, he has more energy and he is less diaphoretic and he was. He has noted some irregularity to his heartbeat over the last few days, and he is concerned he is back in atrial fibrillation.    He denies fever, chills, worsening of his lower extremity edema, orthopnea, palpitations, trouble swallowing or other concerns. He only has noted \"lumps\" at the groin site when he palpates them, otherwise " he is unaware of any abnormality. He is ambulating without issues. He is urinating without issues.    Cardiac MRI 10/2017 showed an EF of 51%. There was mild hypokinesis in the basal inferolateral segment. This segment also had hyperenhancement consistent with prior myocardial infarction in the left circumflex.    EKG today which I overread showed atrial fibrillation at 94 bpm.    Assessment & Plan:    1. Persistant Atrial Fibrillation -   He is now s/p PVI by Dr. Day 11/6/17 and remains on a loading dose of Amiodarone (200 mg BID) x 2 weeks. He will then decreased to daily use, which Dr. Day expects to use for roughly 2-3 months. Metoprolol was discontinued secondary to concerns regarding bradycardia on amiodarone.     Unfortunately, he is now back in atrial fibrillation. His rate is relatively well controlled, and he is actually pretty asymptomatic at this point. At this time, I discussed with Dr. Day and we will    * reinitiate metoprolol tartrate at just 50 mg twice daily.    * He will come back to see me in one to 2 weeks we will repeat an EKG and potentially proceed with cardioversion. I explained that this is not uncommon following his procedure.     * He remains on anticoagulation with Xarelto and he will continue this.    2. HTN - metoprolol tartrate 100 mg twice daily was discontinued, and amlodipine 5 mg twice daily was ordered in an effort to control blood pressure. We noted we would have to watch lower extremity edema carefully given the use of amlodipine. BPs at home have been 120/80s.   * Tolerating amlodipine and will continue   * Restart metoprolol tartrate at 50 mg BID    He will see me in one to 2 weeks with an EKG at that time we will determine if he needs to proceed with cardioversion. Otherwise, he will see Dr. Day 12/15/17.    Crystal Campos PA-C, MSPAS      Orders Placed This Encounter   Procedures     Follow-Up with Cardiac Advanced Practice Provider     EKG 12-lead complete w/read -  Clinics (performed today)     EKG 12-lead complete w/read - Clinics (to be scheduled)     Orders Placed This Encounter   Medications     metoprolol (LOPRESSOR) 50 MG tablet     Sig: Take 1 tablet (50 mg) by mouth 2 times daily     amiodarone (PACERONE/CODARONE) 200 MG tablet     Sig: Take 1 tablet (200 mg) by mouth daily starting 11/22     Dispense:  60 tablet     Refill:  3     Pt will start after he finishes loading dose of 200 mg BID 11/7-11/21     Medications Discontinued During This Encounter   Medication Reason     metoprolol (LOPRESSOR) 50 MG tablet Stopped by Patient         Encounter Diagnoses   Name Primary?     Persistent atrial fibrillation (H) Yes     Benign essential hypertension        CURRENT MEDICATIONS:  Current Outpatient Prescriptions   Medication Sig Dispense Refill     metoprolol (LOPRESSOR) 50 MG tablet Take 1 tablet (50 mg) by mouth 2 times daily       [START ON 11/22/2017] amiodarone (PACERONE/CODARONE) 200 MG tablet Take 1 tablet (200 mg) by mouth daily starting 11/22 60 tablet 3     amiodarone (PACERONE/CODARONE) 200 MG tablet Take 1 tablet (200 mg) by mouth 2 times daily for 14 days 28 tablet 0     amLODIPine (NORVASC) 5 MG tablet Take 1 tablet (5 mg) by mouth 2 times daily 60 tablet 0     losartan (COZAAR) 100 MG tablet Take 1 tablet by mouth daily 90 tablet 1     rivaroxaban ANTICOAGULANT (XARELTO) 20 MG TABS tablet Take 1 tablet (20 mg) by mouth daily (with dinner) AVIOD USE OF ALCOHOL WHILE USING MEDICATION 90 tablet PRN     furosemide (LASIX) 20 MG tablet Take 2 tablets (40 mg) by mouth daily 180 tablet prn     potassium chloride SA (K-DUR/KLOR-CON M) 10 MEQ CR tablet Take 2 tablets (20 mEq) by mouth daily 180 tablet prn     acetaminophen (TYLENOL) 325 MG tablet Take 650 mg by mouth       senna (SENOKOT) 8.6 MG tablet Take 8.6 mg by mouth       Cyanocobalamin (VITAMIN B 12 PO)        indomethacin (INDOCIN) 50 MG capsule Take 1 capsule (50 mg) by mouth 3 times daily as needed for  moderate pain with food 30 capsule 1     allopurinol (ZYLOPRIM) 300 MG tablet        finasteride (PROPECIA) 1 MG tablet Take 1 mg by mouth daily.       [DISCONTINUED] metoprolol (LOPRESSOR) 50 MG tablet Take 2 tablets (100 mg) by mouth 2 times daily 60 tablet 4     order for DME Equipment ordered: RESMED CPAP Mask type: Full face  Settings: 15         ALLERGIES     Allergies   Allergen Reactions     No Known Drug Allergies        PAST MEDICAL HISTORY:  Past Medical History:   Diagnosis Date     Atrial fibrillation (H)     persistent     Essential hypertension, benign      H/O ETOH abuse      Herpes simplex without mention of complication      Hyperlipidemia      Obesity      S/P ablation of atrial fibrillation 2017     Tobacco use disorder     dced 2004       PAST SURGICAL HISTORY:  Past Surgical History:   Procedure Laterality Date     C NONSPECIFIC PROCEDURE      achilles tendon     C NONSPECIFIC PROCEDURE      knees, arthroscopy     C NONSPECIFIC PROCEDURE      basal cell skin ca, nose     C NONSPECIFIC PROCEDURE      flex sig; colonoscopy due 3/2008     C NONSPECIFIC PROCEDURE      cardioversion for atr fib     HERNIORRHAPHY UMBILICAL  2012    Procedure: HERNIORRHAPHY UMBILICAL;  UMBILICAL HERNIA REPAIR;  Surgeon: Ra Crocker MD;  Location: [facility]     ORTHOPEDIC SURGERY         FAMILY HISTORY:  Family History   Problem Relation Age of Onset     Family History Negative Mother      HEART DISEASE Father      decesased at 42 - [place]     HEART DISEASE Sister       MI     HEART DISEASE Brother       MI     HEART DISEASE Brother      CABG AT 49     Lipids Sister      Lipids Sister      Lipids Sister      Family History Negative Brother        SOCIAL HISTORY:  Social History     Social History     Marital status: Single     Spouse name: N/A     Number of children: N/A     Years of education: N/A     Social History Main Topics     Smoking status: Former Smoker     Packs/day: 1.00  "    Years: 10.00     Types: Cigarettes     Quit date: 8/20/2011     Smokeless tobacco: Never Used     Alcohol use No     Drug use: No     Sexual activity: Yes     Partners: Female     Other Topics Concern     None     Social History Narrative       Review of Systems:  Skin:  Negative     Eyes:  Positive for glasses (reading only)  ENT:  Negative    Respiratory:  Positive for dyspnea on exertion  Cardiovascular:  Negative for;chest pain Positive for;lightheadedness;edema  Gastroenterology: Negative    Genitourinary:  Negative    Musculoskeletal:  Negative    Neurologic:  Negative    Psychiatric:  Negative    Heme/Lymph/Imm:  Negative    Endocrine:  Negative      Physical Exam:  Vitals: /87  Pulse 112  Ht 1.676 m (5' 6\")  Wt 129.7 kg (286 lb)  BMI 46.16 kg/m2    Constitutional:  cooperative, alert and oriented, well developed, well nourished, in no acute distress obese      Skin:  warm and dry to the touch, no apparent skin lesions or masses noted        Head:  normocephalic, no masses or lesions        Eyes:  pupils equal and round, conjunctivae and lids unremarkable, sclera white, no xanthalasma, EOMS intact, no nystagmus        ENT:  no pallor or cyanosis, dentition good        Neck:           Chest:  normal breath sounds, clear to auscultation, normal A-P diameter, normal symmetry, normal respiratory excursion, no use of accessory muscles        Cardiac:   irregularly irregular rhythm   no presence of murmur            Abdomen:  abdomen soft, non-tender, BS normoactive, no mass, no HSM, no bruits obese      Vascular: pulses full and equal, no bruits auscultated                                      Extremities and Back:  no deformities, clubbing, cyanosis, erythema observed        Neurological:             Recent Lab Results:  LIPID RESULTS:  Lab Results   Component Value Date    CHOL 178 10/12/2017    HDL 50 10/12/2017    LDL 90 10/12/2017    TRIG 190 (H) 10/12/2017    CHOLHDLRATIO 3.8 03/16/2015 "       LIVER ENZYME RESULTS:  Lab Results   Component Value Date    AST 23 10/12/2017    ALT 40 10/12/2017       CBC RESULTS:  Lab Results   Component Value Date    WBC 10.5 11/06/2017    RBC 4.55 11/06/2017    HGB 16.2 11/06/2017    HCT 46.9 11/06/2017     (H) 11/06/2017    MCH 35.6 (H) 11/06/2017    MCHC 34.5 11/06/2017    RDW 13.3 11/06/2017     11/06/2017       BMP RESULTS:  Lab Results   Component Value Date     11/06/2017    POTASSIUM 3.9 11/06/2017    CHLORIDE 106 11/06/2017    CO2 31 11/06/2017    ANIONGAP 4 11/06/2017     (H) 11/06/2017    BUN 20 11/06/2017    CR 1.00 11/06/2017    GFRESTIMATED 75 11/06/2017    GFRESTBLACK >90 11/06/2017    ASHELY 8.6 11/06/2017        A1C RESULTS:  Lab Results   Component Value Date    A1C 5.7 10/12/2017       INR RESULTS:  Lab Results   Component Value Date    INR 1.66 (H) 11/06/2017    INR 0.95 08/16/2012           CC  Johnny Kay MD  [address] [place] [id number]

## 2017-11-15 ENCOUNTER — TELEPHONE (OUTPATIENT)
Dept: CARDIOLOGY | Facility: CLINIC | Age: 67
End: 2017-11-15

## 2017-11-15 NOTE — TELEPHONE ENCOUNTER
Requested documents from patient  filled out and faxed to [facility]. Patient s/p afib ablation 11/6.  Copy mailed to patient.  PRABHA Palomo

## 2017-11-15 NOTE — TELEPHONE ENCOUNTER
TIFFANIE on pt personal cell that ok to go back to working out as he has been and also to make him aware that short term disability paperwork has been completed. Miguel Ángel

## 2017-11-21 DIAGNOSIS — I10 ESSENTIAL HYPERTENSION: ICD-10-CM

## 2017-11-21 RX ORDER — AMLODIPINE BESYLATE 5 MG/1
5 TABLET ORAL 2 TIMES DAILY
Qty: 180 TABLET | Refills: 3 | Status: SHIPPED | OUTPATIENT
Start: 2017-11-21 | End: 2018-03-02

## 2017-12-11 ENCOUNTER — TELEPHONE (OUTPATIENT)
Dept: CARDIOLOGY | Facility: CLINIC | Age: 67
End: 2017-12-11

## 2017-12-11 NOTE — TELEPHONE ENCOUNTER
Patient returned call and will see Dr Day on 12/15.  appt with Crystal Campos cancelled.  PRABHA Palomo

## 2017-12-11 NOTE — TELEPHONE ENCOUNTER
Patient has appointments scheduled for both 12/14 and 12/15 s/p afib ablation 11/6.  Need to cancel one appt.  Left message for patient to call to discuss.  Awaiting return call.  PRABHA Palomo

## 2017-12-15 ENCOUNTER — OFFICE VISIT (OUTPATIENT)
Dept: CARDIOLOGY | Facility: CLINIC | Age: 67
End: 2017-12-15
Payer: COMMERCIAL

## 2017-12-15 VITALS
DIASTOLIC BLOOD PRESSURE: 84 MMHG | HEART RATE: 84 BPM | SYSTOLIC BLOOD PRESSURE: 134 MMHG | HEIGHT: 66 IN | WEIGHT: 291 LBS | BODY MASS INDEX: 46.77 KG/M2

## 2017-12-15 DIAGNOSIS — I48.19 PERSISTENT ATRIAL FIBRILLATION (H): ICD-10-CM

## 2017-12-15 PROCEDURE — 99213 OFFICE O/P EST LOW 20 MIN: CPT | Performed by: INTERNAL MEDICINE

## 2017-12-15 PROCEDURE — 93000 ELECTROCARDIOGRAM COMPLETE: CPT | Performed by: INTERNAL MEDICINE

## 2017-12-15 NOTE — PROGRESS NOTES
HPI and Plan:   See dictation  [id number]  Orders Placed This Encounter   Procedures     EKG 12-lead complete w/read - Clinics (performed today)     Cardioversion       No orders of the defined types were placed in this encounter.      Medications Discontinued During This Encounter   Medication Reason     indomethacin (INDOCIN) 50 MG capsule Stopped by Patient         Encounter Diagnosis   Name Primary?     Persistent atrial fibrillation (H)        CURRENT MEDICATIONS:  Current Outpatient Prescriptions   Medication Sig Dispense Refill     amLODIPine (NORVASC) 5 MG tablet Take 1 tablet (5 mg) by mouth 2 times daily (Patient taking differently: Take 5 mg by mouth daily ) 180 tablet 3     metoprolol (LOPRESSOR) 50 MG tablet Take 1 tablet (50 mg) by mouth 2 times daily (Patient taking differently: Take 50 mg by mouth daily )       amiodarone (PACERONE/CODARONE) 200 MG tablet Take 1 tablet (200 mg) by mouth daily starting 11/22 60 tablet 3     losartan (COZAAR) 100 MG tablet Take 1 tablet by mouth daily 90 tablet 1     rivaroxaban ANTICOAGULANT (XARELTO) 20 MG TABS tablet Take 1 tablet (20 mg) by mouth daily (with dinner) AVIOD USE OF ALCOHOL WHILE USING MEDICATION 90 tablet PRN     furosemide (LASIX) 20 MG tablet Take 2 tablets (40 mg) by mouth daily 180 tablet prn     potassium chloride SA (K-DUR/KLOR-CON M) 10 MEQ CR tablet Take 2 tablets (20 mEq) by mouth daily 180 tablet prn     acetaminophen (TYLENOL) 325 MG tablet Take 650 mg by mouth       senna (SENOKOT) 8.6 MG tablet Take 8.6 mg by mouth       Cyanocobalamin (VITAMIN B 12 PO)        order for DME Equipment ordered: RESMED CPAP Mask type: Full face  Settings: 15       allopurinol (ZYLOPRIM) 300 MG tablet        finasteride (PROPECIA) 1 MG tablet Take 1 mg by mouth daily.         ALLERGIES     Allergies   Allergen Reactions     No Known Drug Allergies        PAST MEDICAL HISTORY:  Past Medical History:   Diagnosis Date     Atrial fibrillation (H)     persistent      Essential hypertension, benign      H/O ETOH abuse      Herpes simplex without mention of complication      Hyperlipidemia      Obesity      S/P ablation of atrial fibrillation 2017     Tobacco use disorder     dced 2004       PAST SURGICAL HISTORY:  Past Surgical History:   Procedure Laterality Date     C NONSPECIFIC PROCEDURE      achilles tendon     C NONSPECIFIC PROCEDURE      knees, arthroscopy     C NONSPECIFIC PROCEDURE      basal cell skin ca, nose     C NONSPECIFIC PROCEDURE      flex sig; colonoscopy due 3/2008     C NONSPECIFIC PROCEDURE      cardioversion for atr fib     HERNIORRHAPHY UMBILICAL  2012    Procedure: HERNIORRHAPHY UMBILICAL;  UMBILICAL HERNIA REPAIR;  Surgeon: Ra Crocker MD;  Location: [facility]     ORTHOPEDIC SURGERY         FAMILY HISTORY:  Family History   Problem Relation Age of Onset     Family History Negative Mother      HEART DISEASE Father      decesased at 42 - [place]     HEART DISEASE Sister       MI     HEART DISEASE Brother       MI     HEART DISEASE Brother      CABG AT 49     Lipids Sister      Lipids Sister      Lipids Sister      Family History Negative Brother        SOCIAL HISTORY:  Social History     Social History     Marital status: Single     Spouse name: N/A     Number of children: N/A     Years of education: N/A     Social History Main Topics     Smoking status: Former Smoker     Packs/day: 1.00     Years: 10.00     Types: Cigarettes     Quit date: 2011     Smokeless tobacco: Never Used     Alcohol use No     Drug use: No     Sexual activity: Yes     Partners: Female     Other Topics Concern     None     Social History Narrative       Review of Systems:  Skin:  Negative       Eyes:  Positive for glasses (reading only)    ENT:  Negative      Respiratory:  Positive for dyspnea on exertion     Cardiovascular:  Negative for;chest pain Positive for;lightheadedness;edema sweaty  Gastroenterology: Negative      Genitourinary:   "Negative      Musculoskeletal:  Negative      Neurologic:  Negative      Psychiatric:  Negative      Heme/Lymph/Imm:  Negative      Endocrine:  Negative        Physical Exam:  Vitals: /84  Pulse 84  Ht 1.676 m (5' 6\")  Wt 132 kg (291 lb)  BMI 46.97 kg/m2    Constitutional:  cooperative, alert and oriented, well developed, well nourished, in no acute distress        Skin:  warm and dry to the touch, no apparent skin lesions or masses noted          Head:  normocephalic, no masses or lesions        Eyes:  pupils equal and round, conjunctivae and lids unremarkable, sclera white, no xanthalasma, EOMS intact, no nystagmus        Lymph:No Cervical lymphadenopathy present     ENT:           Neck:  carotid pulses are full and equal bilaterally, JVP normal, no carotid bruit        Respiratory:  normal breath sounds, clear to auscultation, normal A-P diameter, normal symmetry, normal respiratory excursion, no use of accessory muscles         Cardiac:   irregularly irregular rhythm   no presence of murmur          pulses full and equal, no bruits auscultated                                        GI:  abdomen soft, non-tender, BS normoactive, no mass, no HSM, no bruits obese      Extremities and Muscular Skeletal:      bilateral LE edema;2+          Neurological:  no gross motor deficits        Psych:  Alert and Oriented x 3        CC  No referring provider defined for this encounter.              "

## 2017-12-15 NOTE — PROGRESS NOTES
HISTORY OF PRESENT ILLNESS:  Thank you for allowing me to participate in the care of your delightful patient.  As you know, Mike is a 67-year-old gentleman with a history of recurrent persistent atrial fibrillation in the background of hypertension, obesity, sleep apnea and preserved LV systolic function who initially was on rhythm control strategy with flecainide but eventually developed recurrent atrial fibrillation without traditional symptoms.  Therefore, he was recommended on rate control strategy.      When I saw the patient for the first time this past October, he was feeling more short of breath, orthopnea and in retrospect stated he felt much better when in sinus rhythm.  I discussed at length about the option of antiarrhythmic drug versus an ablation.  The patient opted for ablation, which was performed about 6 weeks ago.  The patient underwent PVI.  The patient presented in atrial fibrillation at that time, and after PVI a DC cardioversion was performed.  There was no inducible atrial fibrillation afterward.  I also isolated the SVC and did empiric ablation of his CTI as well.      Given the persistent nature of the atrial fibrillation, I had him on amiodarone during the 3 month blanking period, and unfortunately a week later when he presented for followup he was noted to be in atrial fibrillation.  We recommended to continue amiodarone and reassess in a couple of weeks' time and, if he is still in atrial fibrillation, proceed with a DC cardioversion.  Unfortunately, the patient did not follow through.  He had gone back to drinking again 1-2 drinks a night for which he likely is minimizing it.  I told him that definitely drinking alcohol will decrease the success rate of ablation.  I asked him to stop it and proceed with a cardioversion next week.  The patient has been on Xarelto nonstop.  I went over the procedure in detail with risks including but not limited to CVA.  The patient had 2 prior  cardioversions in the past.      The patient also complained of a lot of weight gain which was due to his obesity and some pedal edema which is not responding to Lasix.  I will have him double up the dose of Lasix along with potassium supplement.  I asked him to lose weight and that we may need to do another procedure if he continues to have recurrence of atrial fibrillation in the future.      cc:   Johnny Kay MD    [address]  [phone]         KIKE PRIETO MD             D: 12/15/2017 14:17   T: 12/15/2017 16:31   MT: HEIDI      Name:     TOO HOWARD   MRN:      [id number]        Account:      [id number]   :      1950           Service Date: 12/15/2017      Document: [id number]

## 2017-12-15 NOTE — MR AVS SNAPSHOT
After Visit Summary   12/15/2017    Mike Schultz    MRN: [id number]           Patient Information     Date Of Birth          1950        Visit Information        Provider Department      12/15/2017 1:15 PM Fady Day MD [facility]        Today's Diagnoses     Persistent atrial fibrillation (H)           Follow-ups after your visit        Your next 10 appointments already scheduled     Dec 19, 2017 10:00 AM CST   Cardioversion with  PACU/PROC ROOM   [facility] PACU ([facility])    [address]  [place] [address] [phone]           1) NPO for 8 hours prior to the procedure except for sips of water with medications. 2) Hold insulin or oral diabetic medications morning of procedure. May take   dose long acting insulin. Follow further instructions of PMD. 3) Continue daily Coumadin. ~ If taking Digoxin, hold AM of procedure. ~ Plan to have a responsible adult take you home after the exam. You may not drive, take a bus or taxi by yourself. ~ A responsible adult must stay with you for 24 hours after the test.            Jan 24, 2018  8:15 AM CST   UMP EP RETURN with Fady Kent MD   [facility] ([facility])    [address] [address]  [place] [id number] [phone]              Future tests that were ordered for you today     Open Future Orders        Priority Expected Expires Ordered    Cardioversion Routine 12/19/2017 12/15/2018 12/15/2017            Who to contact     If you have questions or need follow up information about today's clinic visit or your schedule please contact [facility] directly at [phone].  Normal or non-critical lab and imaging results will be communicated to you by MyChart, letter or phone within 4 business days after the clinic has received the results. If you do not hear from us within 7  "days, please contact the clinic through [employer] or phone. If you have a critical or abnormal lab result, we will notify you by phone as soon as possible.  Submit refill requests through [employer] or call your pharmacy and they will forward the refill request to us. Please allow 3 business days for your refill to be completed.          Additional Information About Your Visit        S[employer]Line Information     [employer] gives you secure access to your electronic health record. If you see a primary care provider, you can also send messages to your care team and make appointments. If you have questions, please call your primary care clinic.  If you do not have a primary care provider, please call [phone] and they will assist you.        Care EveryWhere ID     This is your Care EveryWhere ID. This could be used by other organizations to access your [facility] medical records  [id number]        Your Vitals Were     Pulse Height BMI (Body Mass Index)             84 1.676 m (5' 6\") 46.97 kg/m2          Blood Pressure from Last 3 Encounters:   12/15/17 134/84   11/13/17 145/87   11/07/17 135/89    Weight from Last 3 Encounters:   12/15/17 132 kg (291 lb)   11/13/17 129.7 kg (286 lb)   11/07/17 131.8 kg (290 lb 8 oz)              We Performed the Following     EKG 12-lead complete w/read - Clinics (performed today)     EKG 12-lead complete w/read - Clinics (to be scheduled)          Today's Medication Changes          These changes are accurate as of: 12/15/17  2:24 PM.  If you have any questions, ask your nurse or doctor.               These medicines have changed or have updated prescriptions.        Dose/Directions    amLODIPine 5 MG tablet   Commonly known as:  NORVASC   This may have changed:  when to take this   Used for:  Essential hypertension        Dose:  5 mg   Take 1 tablet (5 mg) by mouth 2 times daily   Quantity:  180 tablet   Refills:  3       metoprolol 50 MG tablet   Commonly known as:  LOPRESSOR   This may have " changed:  when to take this   Used for:  Persistent atrial fibrillation (H)        Dose:  50 mg   Take 1 tablet (50 mg) by mouth 2 times daily   Refills:  0                Primary Care Provider Office Phone # Fax #    Johnny Kay [phone] [address] [facility] [address]        Equal Access to Services     [facility]: Hadii aad ku hadasho Soomaali, waaxda luqadaha, qaybta kaalmada adeegyada, waxay idiin hayaan adeeg kharash la'aan . So [facility] [phone].    ATENCIÓN: Si habla español, tiene a hightower disposición servicios gratuitos de asistencia lingüística. Angelesame al [phone].    We comply with applicable federal civil rights laws and [place] laws. We do not discriminate on the basis of race, color, national origin, age, disability, sex, sexual orientation, or gender identity.            Thank you!     Thank you for choosing [facility]  for your care. Our goal is always to provide you with excellent care. Hearing back from our patients is one way we can continue to improve our services. Please take a few minutes to complete the written survey that you may receive in the mail after your visit with us. Thank you!             Your Updated Medication List - Protect others around you: Learn how to safely use, store and throw away your medicines at www.disposemymeds.org.          This list is accurate as of: 12/15/17  2:24 PM.  Always use your most recent med list.                   Brand Name Dispense Instructions for use Diagnosis    acetaminophen 325 MG tablet    TYLENOL     Take 650 mg by mouth        allopurinol 300 MG tablet    ZYLOPRIM      Essential hypertension with goal blood pressure less than 140/90       amiodarone 200 MG tablet    PACERONE/CODARONE    60 tablet    Take 1 tablet (200 mg) by mouth daily starting 11/22    Persistent atrial fibrillation (H)       amLODIPine 5 MG tablet    NORVASC    180 tablet    Take 1 tablet (5  mg) by mouth 2 times daily    Essential hypertension       furosemide 20 MG tablet    LASIX    180 tablet    Take 2 tablets (40 mg) by mouth daily    Peripheral edema       losartan 100 MG tablet    COZAAR    90 tablet    Take 1 tablet by mouth daily    Essential hypertension with goal blood pressure less than 140/90       metoprolol 50 MG tablet    LOPRESSOR     Take 1 tablet (50 mg) by mouth 2 times daily    Persistent atrial fibrillation (H)       order for DME      Equipment ordered: RESMED CPAP Mask type: Full face  Settings: 15        potassium chloride SA 10 MEQ CR tablet    K-DUR/KLOR-CON M    180 tablet    Take 2 tablets (20 mEq) by mouth daily    Peripheral edema       PROPECIA 1 MG tablet   Generic drug:  finasteride      Take 1 mg by mouth daily.        rivaroxaban ANTICOAGULANT 20 MG Tabs tablet    XARELTO    90 tablet    Take 1 tablet (20 mg) by mouth daily (with dinner) AVIOD USE OF ALCOHOL WHILE USING MEDICATION    Persistent atrial fibrillation (H)       senna 8.6 MG tablet    SENOKOT     Take 8.6 mg by mouth        VITAMIN B 12 PO

## 2017-12-15 NOTE — LETTER
12/15/2017    Johnny Kay MD  [address] [facility] [address]      RE: Mike Schultz       Dear Colleague,    I had the pleasure of seeing Mike Schultz in the [facility].    HISTORY OF PRESENT ILLNESS:  Thank you for allowing me to participate in the care of your delightful patient.  As you know, Mike is a 67-year-old gentleman with a history of recurrent persistent atrial fibrillation in the background of hypertension, obesity, sleep apnea and preserved LV systolic function who initially was on rhythm control strategy with flecainide but eventually developed recurrent atrial fibrillation without traditional symptoms.  Therefore, he was recommended on rate control strategy.      When I saw the patient for the first time this past October, he was feeling more short of breath, orthopnea and in retrospect stated he felt much better when in sinus rhythm.  I discussed at length about the option of antiarrhythmic drug versus an ablation.  The patient opted for ablation, which was performed about 6 weeks ago.  The patient underwent PVI.  The patient presented in atrial fibrillation at that time, and after PVI a DC cardioversion was performed.  There was no inducible atrial fibrillation afterward.  I also isolated the SVC and did empiric ablation of his CTI as well.      Given the persistent nature of the atrial fibrillation, I had him on amiodarone during the 3 month blanking period, and unfortunately a week later when he presented for followup he was noted to be in atrial fibrillation.  We recommended to continue amiodarone and reassess in a couple of weeks' time and, if he is still in atrial fibrillation, proceed with a DC cardioversion.  Unfortunately, the patient did not follow through.  He had gone back to drinking again 1-2 drinks a night for which he likely is minimizing it.  I told him that definitely drinking alcohol will decrease the success rate of ablation.  I  asked him to stop it and proceed with a cardioversion next week.  The patient has been on Xarelto nonstop.  I went over the procedure in detail with risks including but not limited to CVA.  The patient had 2 prior cardioversions in the past.      The patient also complained of a lot of weight gain which was due to his obesity and some pedal edema which is not responding to Lasix.  I will have him double up the dose of Lasix along with potassium supplement.  I asked him to lose weight and that we may need to do another procedure if he continues to have recurrence of atrial fibrillation in the future.        Outpatient Encounter Prescriptions as of 12/15/2017   Medication Sig Dispense Refill     amLODIPine (NORVASC) 5 MG tablet Take 1 tablet (5 mg) by mouth 2 times daily (Patient taking differently: Take 5 mg by mouth daily ) 180 tablet 3     metoprolol (LOPRESSOR) 50 MG tablet Take 1 tablet (50 mg) by mouth 2 times daily (Patient taking differently: Take 50 mg by mouth daily )       amiodarone (PACERONE/CODARONE) 200 MG tablet Take 1 tablet (200 mg) by mouth daily starting 11/22 60 tablet 3     losartan (COZAAR) 100 MG tablet Take 1 tablet by mouth daily 90 tablet 1     rivaroxaban ANTICOAGULANT (XARELTO) 20 MG TABS tablet Take 1 tablet (20 mg) by mouth daily (with dinner) AVIOD USE OF ALCOHOL WHILE USING MEDICATION 90 tablet PRN     furosemide (LASIX) 20 MG tablet Take 2 tablets (40 mg) by mouth daily 180 tablet prn     potassium chloride SA (K-DUR/KLOR-CON M) 10 MEQ CR tablet Take 2 tablets (20 mEq) by mouth daily 180 tablet prn     acetaminophen (TYLENOL) 325 MG tablet Take 650 mg by mouth       senna (SENOKOT) 8.6 MG tablet Take 8.6 mg by mouth       Cyanocobalamin (VITAMIN B 12 PO)        order for DME Equipment ordered: RESMED CPAP Mask type: Full face  Settings: 15       allopurinol (ZYLOPRIM) 300 MG tablet        finasteride (PROPECIA) 1 MG tablet Take 1 mg by mouth daily.       [DISCONTINUED] indomethacin  (INDOCIN) 50 MG capsule Take 1 capsule (50 mg) by mouth 3 times daily as needed for moderate pain with food 30 capsule 1     No facility-administered encounter medications on file as of 12/15/2017.      Again, thank you for allowing me to participate in the care of your patient.      Sincerely,    Fady Kent MD     [facility]

## 2017-12-19 ENCOUNTER — HOSPITAL ENCOUNTER (OUTPATIENT)
Dept: SURGERY | Facility: CLINIC | Age: 67
End: 2017-12-19
Attending: INTERNAL MEDICINE | Admitting: INTERNAL MEDICINE
Payer: MEDICARE

## 2017-12-19 ENCOUNTER — HOSPITAL ENCOUNTER (OUTPATIENT)
Facility: CLINIC | Age: 67
Discharge: HOME | End: 2017-12-19
Attending: INTERNAL MEDICINE | Admitting: INTERNAL MEDICINE
Payer: MEDICARE

## 2017-12-19 ENCOUNTER — ANESTHESIA EVENT (OUTPATIENT)
Dept: SURGERY | Facility: CLINIC | Age: 67
End: 2017-12-19
Payer: MEDICARE

## 2017-12-19 ENCOUNTER — ANESTHESIA (OUTPATIENT)
Dept: SURGERY | Facility: CLINIC | Age: 67
End: 2017-12-19
Payer: MEDICARE

## 2017-12-19 VITALS
RESPIRATION RATE: 20 BRPM | DIASTOLIC BLOOD PRESSURE: 84 MMHG | HEART RATE: 72 BPM | SYSTOLIC BLOOD PRESSURE: 126 MMHG | OXYGEN SATURATION: 95 % | TEMPERATURE: 98 F

## 2017-12-19 DIAGNOSIS — I48.19 PERSISTENT ATRIAL FIBRILLATION (H): ICD-10-CM

## 2017-12-19 LAB
MAGNESIUM SERPL-MCNC: 1.6 MG/DL (ref 1.6–2.3)
POTASSIUM SERPL-SCNC: 3.2 MMOL/L (ref 3.4–5.3)

## 2017-12-19 PROCEDURE — A9270 NON-COVERED ITEM OR SERVICE: HCPCS | Mod: GY | Performed by: INTERNAL MEDICINE

## 2017-12-19 PROCEDURE — 83735 ASSAY OF MAGNESIUM: CPT | Performed by: INTERNAL MEDICINE

## 2017-12-19 PROCEDURE — 93005 ELECTROCARDIOGRAM TRACING: CPT

## 2017-12-19 PROCEDURE — 25000128 H RX IP 250 OP 636: Performed by: SURGERY

## 2017-12-19 PROCEDURE — 93010 ELECTROCARDIOGRAM REPORT: CPT | Performed by: INTERNAL MEDICINE

## 2017-12-19 PROCEDURE — 40000010 ZZH STATISTIC ANES STAT CODE-CRNA PER MINUTE

## 2017-12-19 PROCEDURE — 36415 COLL VENOUS BLD VENIPUNCTURE: CPT | Performed by: INTERNAL MEDICINE

## 2017-12-19 PROCEDURE — 40000065 ZZH STATISTIC EKG NON-CHARGEABLE

## 2017-12-19 PROCEDURE — 25000125 ZZHC RX 250: Performed by: NURSE ANESTHETIST, CERTIFIED REGISTERED

## 2017-12-19 PROCEDURE — 25000132 ZZH RX MED GY IP 250 OP 250 PS 637: Mod: GY | Performed by: INTERNAL MEDICINE

## 2017-12-19 PROCEDURE — 25000128 H RX IP 250 OP 636: Performed by: NURSE ANESTHETIST, CERTIFIED REGISTERED

## 2017-12-19 PROCEDURE — 92960 CARDIOVERSION ELECTRIC EXT: CPT | Performed by: INTERNAL MEDICINE

## 2017-12-19 PROCEDURE — 37000008 ZZH ANESTHESIA TECHNICAL FEE, 1ST 30 MIN

## 2017-12-19 PROCEDURE — 92960 CARDIOVERSION ELECTRIC EXT: CPT

## 2017-12-19 PROCEDURE — 84132 ASSAY OF SERUM POTASSIUM: CPT | Performed by: INTERNAL MEDICINE

## 2017-12-19 RX ORDER — SODIUM CHLORIDE, SODIUM LACTATE, POTASSIUM CHLORIDE, CALCIUM CHLORIDE 600; 310; 30; 20 MG/100ML; MG/100ML; MG/100ML; MG/100ML
INJECTION, SOLUTION INTRAVENOUS CONTINUOUS
Status: DISCONTINUED | OUTPATIENT
Start: 2017-12-19 | End: 2017-12-20 | Stop reason: HOSPADM

## 2017-12-19 RX ORDER — MEPERIDINE HYDROCHLORIDE 25 MG/ML
12.5 INJECTION INTRAMUSCULAR; INTRAVENOUS; SUBCUTANEOUS
Status: DISCONTINUED | OUTPATIENT
Start: 2017-12-19 | End: 2017-12-20 | Stop reason: HOSPADM

## 2017-12-19 RX ORDER — ETOMIDATE 2 MG/ML
INJECTION INTRAVENOUS PRN
Status: DISCONTINUED | OUTPATIENT
Start: 2017-12-19 | End: 2017-12-19

## 2017-12-19 RX ORDER — POTASSIUM CHLORIDE 1500 MG/1
40 TABLET, EXTENDED RELEASE ORAL ONCE
Status: COMPLETED | OUTPATIENT
Start: 2017-12-19 | End: 2017-12-19

## 2017-12-19 RX ORDER — METOPROLOL TARTRATE 1 MG/ML
1-2 INJECTION, SOLUTION INTRAVENOUS EVERY 5 MIN PRN
Status: DISCONTINUED | OUTPATIENT
Start: 2017-12-19 | End: 2017-12-20 | Stop reason: HOSPADM

## 2017-12-19 RX ORDER — ONDANSETRON 2 MG/ML
4 INJECTION INTRAMUSCULAR; INTRAVENOUS EVERY 30 MIN PRN
Status: DISCONTINUED | OUTPATIENT
Start: 2017-12-19 | End: 2017-12-20 | Stop reason: HOSPADM

## 2017-12-19 RX ORDER — ONDANSETRON 4 MG/1
4 TABLET, ORALLY DISINTEGRATING ORAL EVERY 30 MIN PRN
Status: DISCONTINUED | OUTPATIENT
Start: 2017-12-19 | End: 2017-12-20 | Stop reason: HOSPADM

## 2017-12-19 RX ORDER — NALOXONE HYDROCHLORIDE 0.4 MG/ML
.1-.4 INJECTION, SOLUTION INTRAMUSCULAR; INTRAVENOUS; SUBCUTANEOUS
Status: DISCONTINUED | OUTPATIENT
Start: 2017-12-19 | End: 2017-12-20 | Stop reason: HOSPADM

## 2017-12-19 RX ORDER — FENTANYL CITRATE 50 UG/ML
25-50 INJECTION, SOLUTION INTRAMUSCULAR; INTRAVENOUS
Status: DISCONTINUED | OUTPATIENT
Start: 2017-12-19 | End: 2017-12-20 | Stop reason: HOSPADM

## 2017-12-19 RX ORDER — PROPOFOL 10 MG/ML
INJECTION, EMULSION INTRAVENOUS PRN
Status: DISCONTINUED | OUTPATIENT
Start: 2017-12-19 | End: 2017-12-19

## 2017-12-19 RX ORDER — FENTANYL CITRATE 50 UG/ML
25-50 INJECTION, SOLUTION INTRAMUSCULAR; INTRAVENOUS
Status: CANCELLED | OUTPATIENT
Start: 2017-12-19

## 2017-12-19 RX ORDER — SODIUM CHLORIDE 9 MG/ML
INJECTION, SOLUTION INTRAVENOUS CONTINUOUS
Status: DISCONTINUED | OUTPATIENT
Start: 2017-12-19 | End: 2017-12-20 | Stop reason: HOSPADM

## 2017-12-19 RX ADMIN — PROPOFOL 80 MG: 10 INJECTION, EMULSION INTRAVENOUS at 10:40

## 2017-12-19 RX ADMIN — SODIUM CHLORIDE, POTASSIUM CHLORIDE, SODIUM LACTATE AND CALCIUM CHLORIDE: 600; 310; 30; 20 INJECTION, SOLUTION INTRAVENOUS at 10:28

## 2017-12-19 RX ADMIN — ETOMIDATE 10 MG: 2 INJECTION, SOLUTION INTRAVENOUS at 10:43

## 2017-12-19 RX ADMIN — PROPOFOL 20 MG: 10 INJECTION, EMULSION INTRAVENOUS at 10:41

## 2017-12-19 RX ADMIN — POTASSIUM CHLORIDE 40 MEQ: 1500 TABLET, EXTENDED RELEASE ORAL at 10:07

## 2017-12-19 ASSESSMENT — LIFESTYLE VARIABLES: TOBACCO_USE: 0

## 2017-12-19 ASSESSMENT — ENCOUNTER SYMPTOMS
DYSRHYTHMIAS: 1
SEIZURES: 0

## 2017-12-19 NOTE — ANESTHESIA PREPROCEDURE EVALUATION
Procedure: * No procedures listed *  Preop diagnosis: * No pre-op diagnosis entered *    Allergies   Allergen Reactions     No Known Drug Allergies      Past Medical History:   Diagnosis Date     Atrial fibrillation (H)     persistent     Essential hypertension, benign      H/O ETOH abuse      Herpes simplex without mention of complication      Hyperlipidemia      Obesity      S/P ablation of atrial fibrillation 11/06/2017     Tobacco use disorder     dced 6/2004     Past Surgical History:   Procedure Laterality Date     C NONSPECIFIC PROCEDURE  1997    achilles tendon     C NONSPECIFIC PROCEDURE      knees, arthroscopy     C NONSPECIFIC PROCEDURE      basal cell skin ca, nose     C NONSPECIFIC PROCEDURE      flex sig; colonoscopy due 3/2008     C NONSPECIFIC PROCEDURE  2012    cardioversion for atr fib     HERNIORRHAPHY UMBILICAL  8/20/2012    Procedure: HERNIORRHAPHY UMBILICAL;  UMBILICAL HERNIA REPAIR;  Surgeon: Ra Crocker MD;  Location: [facility]     ORTHOPEDIC SURGERY       Social History   Substance Use Topics     Smoking status: Former Smoker     Packs/day: 1.00     Years: 10.00     Types: Cigarettes     Quit date: 8/20/2011     Smokeless tobacco: Never Used     Alcohol use No     Prior to Admission medications    Medication Sig Start Date End Date Taking? Authorizing Provider   amLODIPine (NORVASC) 5 MG tablet Take 1 tablet (5 mg) by mouth 2 times daily  Patient taking differently: Take 5 mg by mouth daily  11/21/17  Yes Natalie Campos PA-C   metoprolol (LOPRESSOR) 50 MG tablet Take 1 tablet (50 mg) by mouth 2 times daily  Patient taking differently: Take 50 mg by mouth daily  11/13/17  Yes Natalie Campos PA-C   amiodarone (PACERONE/CODARONE) 200 MG tablet Take 1 tablet (200 mg) by mouth daily starting 11/22 11/22/17  Yes Natalie Campos PA-C   losartan (COZAAR) 100 MG tablet Take 1 tablet by mouth daily 10/11/17  Yes Johnny Kay MD   rivaroxaban ANTICOAGULANT  (XARELTO) 20 MG TABS tablet Take 1 tablet (20 mg) by mouth daily (with dinner) AVIOD USE OF ALCOHOL WHILE USING MEDICATION 5/5/17  Yes Johnny Kay MD   furosemide (LASIX) 20 MG tablet Take 2 tablets (40 mg) by mouth daily  Patient taking differently: Take 40 mg by mouth 2 times daily  3/9/17  Yes Johnny Kay MD   potassium chloride SA (K-DUR/KLOR-CON M) 10 MEQ CR tablet Take 2 tablets (20 mEq) by mouth daily  Patient taking differently: Take 20 mEq by mouth 2 times daily  3/9/17  Yes Johnny Kay MD   Cyanocobalamin (VITAMIN B 12 PO)    Yes Reported, Patient   allopurinol (ZYLOPRIM) 300 MG tablet  5/5/16  Yes Reported, Patient   acetaminophen (TYLENOL) 325 MG tablet Take 650 mg by mouth 2/13/17   Reported, Patient   senna (SENOKOT) 8.6 MG tablet Take 8.6 mg by mouth 2/16/17   Reported, Patient   order for DME Equipment ordered: RESMED CPAP Mask type: Full face  Settings: 15    Reported, Patient   finasteride (PROPECIA) 1 MG tablet Take 1 mg by mouth daily.    Reported, Patient     Current Facility-Administered Medications Ordered in Epic   Medication Dose Route Frequency Last Rate Last Dose     0.9% sodium chloride infusion   Intravenous Continuous         sodium chloride (PF) 0.9% PF flush 3 mL  3 mL Intravenous Q1H PRN         sodium chloride (PF) 0.9% PF flush 3 mL  3 mL Intravenous Q8H PRN         No current [facility]-ordered outpatient prescriptions on file.       NaCl       Wt Readings from Last 1 Encounters:   12/15/17 132 kg (291 lb)     Temp Readings from Last 1 Encounters:   12/19/17 36.7  C (98  F) (Temporal)     BP Readings from Last 6 Encounters:   12/19/17 113/83   12/15/17 134/84   11/13/17 145/87   11/07/17 135/89   10/27/17 130/80   10/12/17 (!) 148/102     Pulse Readings from Last 4 Encounters:   12/19/17 72   12/15/17 84   11/13/17 112   11/06/17 69     Resp Readings from Last 1 Encounters:   12/19/17 16     SpO2 Readings from Last 1 Encounters:   12/19/17 94%     Recent Labs   Lab  Test  17   1159  10/26/17   0849   NA  141  142   POTASSIUM  3.9  4.2   CHLORIDE  106  104   CO2  31  30   ANIONGAP  4  8   GLC  113*  113*   BUN  20  29   CR  1.00  1.35*   ASHELY  8.6  8.7     Recent Labs   Lab Test  10/12/17   0814  16   1840   AST  23  27   ALT  40  47   ALKPHOS  83  99   BILITOTAL  1.0  0.9     Recent Labs   Lab Test  17   1159  17   0939 16   WBC  10.5   --   6.8   HGB  16.2  16.2  15.7   PLT  190   --   299     No results for input(s): ABO, RH in the last 65687 hours.  Recent Labs   Lab Test  17   1159  12   0951   INR  1.66*  0.95      No results for input(s): TROPI in the last 69271 hours.  No results for input(s): PH, PCO2, PO2, HCO3 in the last 67267 hours.  No results for input(s): HCG in the last 23147 hours.  No results found for this or any previous visit (from the past 744 hour(s)).    RECENT LABS:   ECG:   ECHO:       Anesthesia Evaluation     . Pt has had prior anesthetic.     No history of anesthetic complications          ROS/MED HX    ENT/Pulmonary:     (+)sleep apnea, , . .   (-) tobacco use   Neurologic:      (-) seizures and CVA   Cardiovascular: Comment: S/p afib ablation     (+) Dyslipidemia, hypertension----. Taking blood thinners : . . . :. dysrhythmias a-fib, . Previous cardiac testing Echodate:results:Name: TOO HOWARD  MRN: [id number]  : 1950  Study Date: 10/18/2017 07:53 AM  Age: 67 yrs  Gender: Male  Patient Location: [facility]  Reason For Study: , Persistent atrial fibrillation  History: A-FIB  Ordering Physician: MADDIE BURNETTE  Referring Physician: MADDIE BURNETTE  Performed By: Tomas Cruz RDCS     BSA: 2.3 m2  Height: 66 in  Weight: 289 lb  HR: 72  BP: 159/90 mmHg  _____________________________________________________________________________  __        Procedure  Complete Echo Adult. Contrast  Optison.  _____________________________________________________________________________  __        Interpretation Summary     ild to moderate or moderate valvular aortic stenosis noited and the mean AoV  pressure gradient is 11mmHg (previously 7 mmHg).. There is trace to mild  aortic regurgitation. The aortic valve is not well visualized.  The ascending aorta is mildly dilated.  Left ventricular systolic function is normal. The visual ejection fraction is  estimated at 55-60%. There is mild concentric left ventricular hypertrophy.  There is moderate biatrial enlargement.  There is mild (1+) mitral regurgitation.  The rhythm was atrial fibrillation.  There is now aortic stenosis, probably mild to moderate. LV function is  probably normal. Technically difficult, suboptimal study. Contrast was used  without apparent complications.  _____________________________________________________________________________  __        Left Ventricle  The left ventricle is normal in size. There is mild concentric left  ventricular hypertrophy. Left ventricular systolic function is normal. The  visual ejection fraction is estimated at 55-60%. E by E prime ratio is greater  than 15, that likely suggests increased left ventricular filling pressures. No  regional wall motion abnormalities noted.     Right Ventricle  Borderline right ventricular enlargement. The right ventricular systolic  function is normal.     Atria  There is moderate biatrial enlargement. There is no atrial shunt seen.     Mitral Valve  There is mild (1+) mitral regurgitation.        Tricuspid Valve  The right ventricular systolic pressure is approximated at 31mmHg plus the  right atrial pressure. There is mild (1+) tricuspid regurgitation. Normal IVC  (1.5-2.5cm) with >50% respiratory collapse; right atrial pressure is estimated  at 5-10mmHg.     Aortic Valve  The aortic valve is not well visualized. Thickened aortic valve leaflets.  There is trace to mild aortic  regurgitation. The mean AoV pressure gradient is  11mmHg. Moderate valvular aortic stenosis.     Pulmonic Valve  There is no pulmonic valvular stenosis.     Vessels  The ascending aorta is Mildly dilated. It is 4.2 cm above ST ridge. Inferior  vena cava not well visualized for estimation of right atrial pressure.     Pericardium  The pericardium appears normal.        Rhythm  The rhythm was atrial fibrillation.date: results: date: results: date: results:          METS/Exercise Tolerance:  >4 METS   Hematologic:         Musculoskeletal:         GI/Hepatic:        (-) GERD and liver disease   Renal/Genitourinary:      (-) renal disease   Endo:     (+) Obesity, .   (-) Type II DM and thyroid disease   Psychiatric:     (+) psychiatric history other (comment) (Etoh abuse hx)      Infectious Disease:         Malignancy:         Other:                     Physical Exam  Normal systems: cardiovascular, pulmonary and dental    Airway   Mallampati: II  TM distance: >3 FB  Neck ROM: full    Dental     Cardiovascular       Pulmonary                          Anesthesia Plan      History & Physical Review  History and physical reviewed and following examination; no interval change.    ASA Status:  3 .    NPO Status:  > 8 hours    Plan for General and Other with Intravenous induction.   PONV prophylaxis:  Ondansetron (or other 5HT-3) and Dexamethasone or Solumedrol       Postoperative Care  Postoperative pain management:  IV analgesics.      Consents  Anesthetic plan, risks, benefits and alternatives discussed with:  Patient and Spouse..                .

## 2017-12-19 NOTE — OP NOTE
PROCEDURE:  Cardioversion.      INDICATIONS:  Atrial fibrillation.      PATIENT HISTORY:  Mr. Too Howard is 67 years old.  He has a history of atrial fibrillation and a prior ablation.  He is on Xarelto which he has taken without interruption, and he is on amiodarone.  His potassium was 3.2 today, and he has received an additional 40 mEq of potassium.  His Lasix was recently increased by his primary care practitioner from 40 mg p.o. b.i.d. to 80 mg p.o. b.i.d. for lower extremity edema and fluid retention.      PROCEDURE NOTE:  A single successful shock was administered at 200 joules.  Atrial fibrillation converted to normal sinus rhythm.  There were no complications.      DISCHARGE FOLLOWUP:   1.  The patient will have a basic metabolic panel performed in two days at [facility] in [place].   2.  A followup has been arranged in 2 weeks with the advanced practice provider in Electrophysiology and with a basic metabolic panel.      ADDENDUM:  It is possible that the patient would benefit from a substitution for some dose of Lasix by spironolactone, which could work synergistically with the Lasix.  It would have the advantage of maintaining a higher potassium level than Lasix alone.         WALTER YING MD, [facility]             D: 2017 10:57   T: 2017 14:21   MT:       Name:     TOO HOWARD   MRN:      [id number]        Account:        [id number]   :      1950           Procedure Date: 2017      Document: [id number]       cc: Johnny Kay MD

## 2017-12-19 NOTE — IP AVS SNAPSHOT
MRN:[id number]                      After Visit Summary   12/19/2017    Mike Schultz    MRN: [id number]           Thank you!     Thank you for choosing [facility] for your care. Our goal is always to provide you with excellent care. Hearing back from our patients is one way we can continue to improve our services. Please take a few minutes to complete the written survey that you may receive in the mail after you visit with us. Thank you!        Patient Information     Date Of Birth          1950        About your hospital stay     You were admitted on:  December 19, 2017 You last received care in the:  [facility] PACU    You were discharged on:  December 19, 2017       Who to Call     For medical emergencies, please call 911.  For non-urgent questions about your medical care, please call your primary care provider or clinic, [phone]          Attending Provider     Provider Specialty    Fady Day MD Cardiology       Primary Care Provider Office Phone # Fax #    Johnny Santo Kay [phone]      Your next 10 appointments already scheduled     Jan 24, 2018  8:15 AM CST   [facility] EP RETURN with Fady Kent MD   [facility] ([facility])    [address] [place] [id number] [phone]              Additional Services     Follow-Up with Cardiac Advanced Practice Provider       Post-cardioversion, check EKG and potassium.                  Future tests that were ordered for you     Basic metabolic panel           Basic metabolic panel       With OTTO visit.                  Further instructions from your care team       Pt to have followup labs at [facility] heart on Thursday  She will speak to Dr. Day about water pill change and Dr. Carmen nurse will contact you in next few days about any changes.      Pending Results     No orders found from 12/17/2017 to 12/20/2017.            Admission Information      Date & Time Provider Department Dept. Phone    12/19/2017 Fady Day MD [facility] [phone]      Your Vitals Were     Blood Pressure Pulse Temperature Respirations Pulse Oximetry       126/84 72 98  F (36.7  C) (Temporal) 20 95%       MyChart Information     [employer] gives you secure access to your electronic health record. If you see a primary care provider, you can also send messages to your care team and make appointments. If you have questions, please call your primary care clinic.  If you do not have a primary care provider, please call [phone] and they will assist you.        Care EveryWhere ID     This is your Care EveryWhere ID. This could be used by other organizations to access your [facility] medical records  [id number]        Equal Access to Services     SABAS STOKES : Van Dean, faustino fien, mamta kaalmalcolm dalton, kaykay olea. So [facility] [phone].    ATENCIÓN: Si habla español, tiene a hightower disposición servicios gratuitos de asistencia lingüística. Llame al [phone].    We comply with applicable federal civil rights laws and [place] laws. We do not discriminate on the basis of race, color, national origin, age, disability, sex, sexual orientation, or gender identity.               Review of your medicines      Notice     This visit is during an admission. Changes to the med list made in this visit will be reflected in the After Visit Summary of the admission.             Protect others around you: Learn how to safely use, store and throw away your medicines at www.disposemymeds.org.             Medication List: This is a list of all your medications and when to take them. Check marks below indicate your daily home schedule. Keep this list as a reference.      Notice     This visit is during an admission. Changes to the med list made in this visit will be reflected in the After Visit Summary of the admission.               More Information        Discharge Instructions for Cardioversion  Your healthcare provider performed a procedure called cardioversion. Your healthcare provider used a controlled electric shock or a medicine to briefly stop all electrical activity in your heart. This helped restore your heart s normal rhythm. Here are some instructions to follow while you recover.  Home care    Because cardioversion typically requires sedation, you won't be able to drive home. You will need a ride. Wait at least 24 hours before driving a car or operating heavy machinery after receiving sedating medicines.    Don t be alarmed if the skin on your chest is irritated or feels like it is sunburned. Your healthcare provider may prescribe a soothing lotion to relieve this discomfort. These minor symptoms will go away in a few days.    Ask your healthcare provider about medicines to keep your heart rhythm steady.    If you were prescribed medicine, take it as instructed by your healthcare provider. Don t skip doses or take double doses. Cardioversion requires blood thinners for at least 4 weeks to prevent a delayed risk of stroke when treating atrial fibrillation or atrial flutter. Be sure you discuss which medicine you are taking to prevent stroke. Ask when you need to have your medicine levels checked, and whether you may be able to stop taking it in the future or whether it is recommended that you take it for life. Some of these blood-thinning medicines will have the dose adjusted, and interact with other medicines or foods. Your healthcare team will give you full instructions on what to watch out for. Report bleeding or symptoms of stroke immediately to your healthcare team and seek emergency medical attention.    Learn to take your own pulse. Keep a record of your results. Ask your healthcare provider when you should seek emergency medical attention. He or she will tell you which pulse rate reading is dangerous.     Keep in  mind this procedure may need to be repeated if the abnormal heart rhythm returns. After the procedure, your healthcare provider will tell you if the treatment worked or if you will need further treatments or medication.  Follow-up care  Make a follow-up appointment, or as directed by our staff.     When to call your healthcare provider  Call 911 right away if you have:    Chest pain    Shortness of breath    Loss of vision, speech, or strength or coordination in any body part  Otherwise, call your healthcare provider immediately if you have:    Fainting, dizzy, or lightheaded    Chest pain with increased activity    Irregular heartbeat or fast pulse    Bleeding issues from blood-thinning medicines   Date Last Reviewed: 3/1/2017    7175-2261 [employer]. [address] [place] [address], [place] [address]. All rights reserved. This information is not intended as a substitute for professional medical care. Always follow your healthcare professional's instructions.

## 2017-12-19 NOTE — PROVIDER NOTIFICATION
Dr. Hammond notified of K of 3.2 and magnesium of 1.6.  Dr. Hammond did not want to replace magnesium at this time but to give patient 40 meq of potassium now.

## 2017-12-19 NOTE — IP AVS SNAPSHOT
[place] [address]    [place] [address]    Phone:  [phone]                                       After Visit Summary   12/19/2017    Mike Schultz    MRN: [id number]           After Visit Summary Signature Page     I have received my discharge instructions, and my questions have been answered. I have discussed any challenges I see with this plan with the nurse or doctor.    ..........................................................................................................................................  Patient/Patient Representative Signature      ..........................................................................................................................................  Patient Representative Print Name and Relationship to Patient    ..................................................               ................................................  Date                                            Time    ..........................................................................................................................................  Reviewed by Signature/Title    ...................................................              ..............................................  Date                                                            Time

## 2017-12-19 NOTE — DISCHARGE INSTRUCTIONS
Pt to have followup labs at [facility] heart on Thursday  She will speak to Dr. Day about water pill change and Dr. Carmen nurse will contact you in next few days about any changes.

## 2017-12-20 ENCOUNTER — TELEPHONE (OUTPATIENT)
Dept: CARDIOLOGY | Facility: CLINIC | Age: 67
End: 2017-12-20

## 2017-12-20 NOTE — TELEPHONE ENCOUNTER
Patient called as follow up as he was instructed to do this after his cardioversion yesterday to discuss medications changes.  Patient s/p successful cardioversion 12/19 done by Dr Hammond.  Reviewed note per Dr Hammond and there was no indication of additional medications added to regimen it was noted--Lasix was recently increased by his primary care practitioner from 40 mg p.o. b.i.d. to 80 mg p.o. b.i.d. for lower extremity edema and fluid retention.    Reviewed medication list with patient and the indicated dose in Dr Khan note was incorrect.  Clarified with patient and he is taking lasix 40mg po bid with Potassium Chloride 20meq po bid.  This was recently increased at Dr Dya's appt on 12/15 d/t to his SCOTT.   It was noted prior to cardioversion patient potassium was 3.2 and he received additional 40meq of potassium prior to procedure.  Patient is scheduled for [facility] 12/20 to recheck electrolytes.  Follow up appt scheduled in two weeks wit OTTO not with EP  Spoke to patient and informed him I would have Crystal Campos review if follow up appt is necessary in two weeks after she reviews lab results that will be done tomorrow and if appointment is needed will need to change provider and date.    A one month follow up s/p cardioversion was made for patient prior to procedure he is due to see Dr Day on 1/24.  Will have Crystal Campos review.  PRABHA Palomo

## 2017-12-21 ENCOUNTER — DOCUMENTATION ONLY (OUTPATIENT)
Dept: CARDIOLOGY | Facility: CLINIC | Age: 67
End: 2017-12-21

## 2017-12-21 DIAGNOSIS — I48.19 PERSISTENT ATRIAL FIBRILLATION (H): ICD-10-CM

## 2017-12-21 DIAGNOSIS — R60.0 PERIPHERAL EDEMA: ICD-10-CM

## 2017-12-21 LAB
ANION GAP SERPL CALCULATED.3IONS-SCNC: 9.9 MMOL/L (ref 6–17)
BUN SERPL-MCNC: 19 MG/DL (ref 7–30)
CALCIUM SERPL-MCNC: 9.1 MG/DL (ref 8.5–10.5)
CHLORIDE SERPL-SCNC: 100 MMOL/L (ref 98–107)
CO2 SERPL-SCNC: 30 MMOL/L (ref 23–29)
CREAT SERPL-MCNC: 1.49 MG/DL (ref 0.7–1.3)
GFR SERPL CREATININE-BSD FRML MDRD: 47 ML/MIN/1.7M2
GLUCOSE SERPL-MCNC: 150 MG/DL (ref 70–105)
INTERPRETATION ECG - MUSE: NORMAL
POTASSIUM SERPL-SCNC: 3.9 MMOL/L (ref 3.5–5.1)
SODIUM SERPL-SCNC: 136 MMOL/L (ref 136–145)

## 2017-12-21 PROCEDURE — 80048 BASIC METABOLIC PNL TOTAL CA: CPT | Performed by: INTERNAL MEDICINE

## 2017-12-21 PROCEDURE — 36415 COLL VENOUS BLD VENIPUNCTURE: CPT | Performed by: INTERNAL MEDICINE

## 2017-12-21 NOTE — TELEPHONE ENCOUNTER
This is Dr. Yates's patient.  Please have his team follow up on this result.  His PCP has been adjusting his diuretics and the BMP needs to be discussed with that office in the AM.  Thanks. CD

## 2017-12-21 NOTE — PROGRESS NOTES
BMP results 12/21/17 noted.  Na K BUN Creat GFR    12/21/17 0823 136 3.9 19 1.49 47   12/19/17 0858 -- 3.2 -- -- --   11/06/17 1159 141 3.9 20 1.00 75     Will message Dr. Hammond for review.

## 2017-12-22 NOTE — PROGRESS NOTES
Looks like Dr. Day increased Lasix to 40 mg BID and KCl to 20 mEq BID back on 12/15 due to fluid retention.This is likely cause of increase of Cr from 1.0 to 1.49.    Decrease Lasix back to 40 mg daily (or 20 mg BID, whichever he was taking) but keep KCl 20 BID (as K was low at time of DCCV).    * FU non-fasting BMP in 1-2 weeks (pls place order).   * Has appt with QP appt 1/24 - I think he could probably cancel appt with Kirstin scheduled for 1/11 if he's agreeable.    * LIMIT Sodium, alcohol, put feet up whenever possible and call if he starts to swell with fluid before appt with QP.      Crystal

## 2017-12-22 NOTE — PROGRESS NOTES
Message left for patient to call to discuss BMP results and medication changes.  Awaiting return call.  PRABHA Palomo

## 2017-12-22 NOTE — PROGRESS NOTES
Dr. Hammond wrote that patient's recent BMP should be reviewed by Dr. Yates. It appears though that pt now follows with Dr. Day for EP. Will forward to Dr. Day to review the BMP.     Last Basic Metabolic Panel:  Lab Results   Component Value Date     12/21/2017      Lab Results   Component Value Date    POTASSIUM 3.9 12/21/2017     Lab Results   Component Value Date    CHLORIDE 100 12/21/2017     Lab Results   Component Value Date    ASHELY 9.1 12/21/2017     Lab Results   Component Value Date    CO2 30 12/21/2017     Lab Results   Component Value Date    BUN 19 12/21/2017     Lab Results   Component Value Date    CR 1.49 12/21/2017     Lab Results   Component Value Date     12/21/2017

## 2017-12-26 RX ORDER — POTASSIUM CHLORIDE 750 MG/1
20 TABLET, EXTENDED RELEASE ORAL 2 TIMES DAILY
Qty: 90 TABLET | COMMUNITY
Start: 2017-12-26 | End: 2018-03-02

## 2017-12-26 RX ORDER — FUROSEMIDE 20 MG
40 TABLET ORAL DAILY
Qty: 60 TABLET | Refills: 1 | COMMUNITY
Start: 2017-12-26 | End: 2018-04-06

## 2017-12-26 NOTE — PROGRESS NOTES
Patient returned call.  Reviewed with patient recommendations per TOBI Rivas.    1.decrease lasix back to 40mg po qd  2.Keep taking KCL 20meq po bid  3. Follow up lab (BMP) in 1-2 weeks.  Patient already had BMP scheduled for 1/11 will keep that appt  4. Cancel appt with OTTO on 1/11 as patient will be seeing Dr Day on 1/24.  5.LIMIT Sodium, alcohol, put feet up whenever possible and call if he starts to swell with fluid before appt with Dr Day    Patient provided verbal understanding of above instructions.  Medication list updated in epic.  Patient did not need refills on medications at this time.  PRABHA Palomo

## 2018-01-08 NOTE — ANESTHESIA POSTPROCEDURE EVALUATION
Patient: Mike Schultz    * No procedures listed *    Diagnosis:* No pre-op diagnosis entered *  Diagnosis Additional Information: No value filed.    Anesthesia Type:  General    Note:  Anesthesia Post Evaluation    Patient location during evaluation: Bedside  Patient participation: Able to fully participate in evaluation  Level of consciousness: awake  Pain management: adequate  Airway patency: patent  Cardiovascular status: acceptable  Respiratory status: acceptable  Hydration status: acceptable  PONV: none     Anesthetic complications: None          Last vitals:  Vitals:    12/19/17 1055 12/19/17 1103 12/19/17 1106   BP: 128/71 116/84 126/84   Pulse:      Resp: 20 9 20   Temp:      SpO2:  95% 95%         Electronically Signed By: Lubna Dong MD, MD  January 8, 2018  1:31 PM

## 2018-01-11 DIAGNOSIS — I48.19 PERSISTENT ATRIAL FIBRILLATION (H): ICD-10-CM

## 2018-01-11 LAB
ANION GAP SERPL CALCULATED.3IONS-SCNC: 13.8 MMOL/L (ref 6–17)
BUN SERPL-MCNC: 23 MG/DL (ref 7–30)
CALCIUM SERPL-MCNC: 9.5 MG/DL (ref 8.5–10.5)
CHLORIDE SERPL-SCNC: 104 MMOL/L (ref 98–107)
CO2 SERPL-SCNC: 27 MMOL/L (ref 23–29)
CREAT SERPL-MCNC: 1.32 MG/DL (ref 0.7–1.3)
GFR SERPL CREATININE-BSD FRML MDRD: 54 ML/MIN/1.7M2
GLUCOSE SERPL-MCNC: 120 MG/DL (ref 70–105)
POTASSIUM SERPL-SCNC: 3.8 MMOL/L (ref 3.5–5.1)
SODIUM SERPL-SCNC: 141 MMOL/L (ref 136–145)

## 2018-01-11 PROCEDURE — 36415 COLL VENOUS BLD VENIPUNCTURE: CPT | Performed by: INTERNAL MEDICINE

## 2018-01-11 PROCEDURE — 80048 BASIC METABOLIC PNL TOTAL CA: CPT | Performed by: INTERNAL MEDICINE

## 2018-01-24 ENCOUNTER — OFFICE VISIT (OUTPATIENT)
Dept: CARDIOLOGY | Facility: CLINIC | Age: 68
End: 2018-01-24
Payer: COMMERCIAL

## 2018-01-24 VITALS
BODY MASS INDEX: 45.16 KG/M2 | WEIGHT: 281 LBS | HEART RATE: 66 BPM | HEIGHT: 66 IN | DIASTOLIC BLOOD PRESSURE: 89 MMHG | SYSTOLIC BLOOD PRESSURE: 132 MMHG

## 2018-01-24 DIAGNOSIS — I48.20 CHRONIC A-FIB (H): Primary | ICD-10-CM

## 2018-01-24 PROCEDURE — 99214 OFFICE O/P EST MOD 30 MIN: CPT | Mod: 25 | Performed by: INTERNAL MEDICINE

## 2018-01-24 PROCEDURE — 93000 ELECTROCARDIOGRAM COMPLETE: CPT | Performed by: INTERNAL MEDICINE

## 2018-01-24 NOTE — LETTER
1/24/2018      Johnny Kay MD  [address] [facility] [address]      RE: Mike Schultz       Dear Colleague,    I had the pleasure of seeing Mike Schultz in the [facility].    Service Date: 01/24/2018      HISTORY OF PRESENT ILLNESS:  Thank you for allowing me to participate in the care of this very delightful patient.  As you know, Abraham is a 67-year-old gentleman with a history of recurrent persistent atrial fibrillation over the past several years now in the background of hypertension, obesity, sleep apnea and preserved LV systolic function.  Initially, was on flecainide for rhythm control strategy but later on switched to rate control strategy, as his symptoms were minimal when AFib recurred.      When I saw the patient for the first time this past October, he started to be more short of breath along with increasing pedal edema.  The patient stated in retrospect he felt much better in sinus rhythm.  Therefore, I took the liberty of recommending catheter-based ablation, as he already failed flecainide in the past and I did not want to expose him to amiodarone with its potential side effects, especially in this age group.      The patient underwent isolation of the pulmonary veins and SVC in addition to empiric ablation of CTI.  He presented in atrial fibrillation at that time and after isolation of the pulmonary veins, DC cardioversion was performed and I did not elect to perform any more ablations, as I was not able to induce any other arrhythmias.  I had him on amiodarone afterwards.      Unfortunately, the patient came back with atrial fibrillation and I recommended DC cardioversion.  The patient did start drinking again a couple days after his ablation and it likely played a role of this recurrence as well.  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      Abraham is doing much better with less shortness of breath and pedal edema.  He is looking forward to have his knee surgery  in which his limiting factor is exercising.  I emphasized the importance of alcohol cessation in terms of for his health overall and to just increases success of ablation.  Also, counseled about weight loss as well.  For now, will continue all medications including amiodarone, metoprolol, Xarelto and have him come back in about 3 months' time for reassessment.  If he is doing quite well at that time, we may consider to take off his amiodarone.  EKG today demonstrates sinus bradycardia, rate 46 beats per minute.  The patient seemed to tolerate the bradycardia without any symptoms of lightheadedness or decreased exercise tolerance.       Outpatient Encounter Prescriptions as of 1/24/2018   Medication Sig Dispense Refill     furosemide (LASIX) 20 MG tablet Take 2 tablets (40 mg) by mouth daily 60 tablet 1     potassium chloride (K-TAB,KLOR-CON) 10 MEQ tablet Take 2 tablets (20 mEq) by mouth 2 times daily 90 tablet      amLODIPine (NORVASC) 5 MG tablet Take 1 tablet (5 mg) by mouth 2 times daily (Patient taking differently: Take 5 mg by mouth daily ) 180 tablet 3     metoprolol (LOPRESSOR) 50 MG tablet Take 1 tablet (50 mg) by mouth 2 times daily (Patient taking differently: Take 50 mg by mouth daily )       amiodarone (PACERONE/CODARONE) 200 MG tablet Take 1 tablet (200 mg) by mouth daily starting 11/22 60 tablet 3     losartan (COZAAR) 100 MG tablet Take 1 tablet by mouth daily 90 tablet 1     rivaroxaban ANTICOAGULANT (XARELTO) 20 MG TABS tablet Take 1 tablet (20 mg) by mouth daily (with dinner) AVIOD USE OF ALCOHOL WHILE USING MEDICATION 90 tablet PRN     acetaminophen (TYLENOL) 325 MG tablet Take 650 mg by mouth       senna (SENOKOT) 8.6 MG tablet Take 8.6 mg by mouth       Cyanocobalamin (VITAMIN B 12 PO)        order for DME Equipment ordered: RESMED CPAP Mask type: Full face  Settings: 15       allopurinol (ZYLOPRIM) 300 MG tablet        finasteride (PROPECIA) 1 MG tablet Take 1 mg by mouth daily.       No  facility-administered encounter medications on file as of 1/24/2018.        Again, thank you for allowing me to participate in the care of your patient.      Sincerely,    Fady Kent MD     [facility]

## 2018-01-24 NOTE — MR AVS SNAPSHOT
After Visit Summary   1/24/2018    Mike Schultz    MRN: [id number]           Patient Information     Date Of Birth          1950        Visit Information        Provider Department      1/24/2018 8:15 AM Fady Day MD [facility]        Today's Diagnoses     Chronic a-fib (H)    -  1       Follow-ups after your visit        Additional Services     Follow-Up with Cardiac Advanced Practice Provider                 Your next 10 appointments already scheduled     Feb 15, 2018  8:45 AM CST   New Visit with Lovely Hammond MD   [facility] ([facility])    [address] [place], [place] [id number]  [facility] [id number] [phone]              Future tests that were ordered for you today     Open Future Orders        Priority Expected Expires Ordered    Follow-Up with Cardiac Advanced Practice Provider Routine 4/24/2018 1/24/2019 1/24/2018            Who to contact     If you have questions or need follow up information about today's clinic visit or your schedule please contact [facility] directly at [phone].  Normal or non-critical lab and imaging results will be communicated to you by Anam Mobilehart, letter or phone within 4 business days after the clinic has received the results. If you do not hear from us within 7 days, please contact the clinic through PlayScapet or phone. If you have a critical or abnormal lab result, we will notify you by phone as soon as possible.  Submit refill requests through Piki or call your pharmacy and they will forward the refill request to us. Please allow 3 business days for your refill to be completed.          Additional Information About Your Visit        MyChart Information     Piki gives you secure access to your electronic health record. If you see a primary care provider, you can also send messages to your care team and make appointments.  "If you have questions, please call your primary care clinic.  If you do not have a primary care provider, please call [phone] and they will assist you.        Care EveryWhere ID     This is your Care EveryWhere ID. This could be used by other organizations to access your [facility] medical records  [id number]        Your Vitals Were     Pulse Height BMI (Body Mass Index)             66 1.676 m (5' 6\") 45.35 kg/m2          Blood Pressure from Last 3 Encounters:   01/24/18 132/89   12/19/17 126/84   12/15/17 134/84    Weight from Last 3 Encounters:   01/24/18 127.5 kg (281 lb)   12/15/17 132 kg (291 lb)   11/13/17 129.7 kg (286 lb)              We Performed the Following     EKG 12-lead complete w/read - Clinics (performed today)          Today's Medication Changes          These changes are accurate as of 1/24/18  9:02 AM.  If you have any questions, ask your nurse or doctor.               These medicines have changed or have updated prescriptions.        Dose/Directions    amLODIPine 5 MG tablet   Commonly known as:  NORVASC   This may have changed:  when to take this   Used for:  Essential hypertension        Dose:  5 mg   Take 1 tablet (5 mg) by mouth 2 times daily   Quantity:  180 tablet   Refills:  3       metoprolol tartrate 50 MG tablet   Commonly known as:  LOPRESSOR   This may have changed:  when to take this   Used for:  Persistent atrial fibrillation (H)        Dose:  50 mg   Take 1 tablet (50 mg) by mouth 2 times daily   Refills:  0                Primary Care Provider Office Phone # Fax #    Johnny Santo Kay [phone]       [address] [place] [phone]        Equal Access to Services     SABAS STOKES : Hadii radha Dean, waharshalda rody, qaybta kaalkaykay zarco. So [facility] [phone].    ATENCIÓN: Si habla español, tiene a hightower disposición servicios gratuitos de asistencia lingüística. Llame al [phone].    We " comply with applicable federal civil rights laws and [place] laws. We do not discriminate on the basis of race, color, national origin, age, disability, sex, sexual orientation, or gender identity.            Thank you!     Thank you for choosing [facility]  for your care. Our goal is always to provide you with excellent care. Hearing back from our patients is one way we can continue to improve our services. Please take a few minutes to complete the written survey that you may receive in the mail after your visit with us. Thank you!             Your Updated Medication List - Protect others around you: Learn how to safely use, store and throw away your medicines at www.disposemymeds.org.          This list is accurate as of 1/24/18  9:02 AM.  Always use your most recent med list.                   Brand Name Dispense Instructions for use Diagnosis    acetaminophen 325 MG tablet    TYLENOL     Take 650 mg by mouth        allopurinol 300 MG tablet    ZYLOPRIM      Essential hypertension with goal blood pressure less than 140/90       amiodarone 200 MG tablet    PACERONE/CODARONE    60 tablet    Take 1 tablet (200 mg) by mouth daily starting 11/22    Persistent atrial fibrillation (H)       amLODIPine 5 MG tablet    NORVASC    180 tablet    Take 1 tablet (5 mg) by mouth 2 times daily    Essential hypertension       LASIX 20 MG tablet   Generic drug:  furosemide     60 tablet    Take 2 tablets (40 mg) by mouth daily    Peripheral edema       losartan 100 MG tablet    COZAAR    90 tablet    Take 1 tablet by mouth daily    Essential hypertension with goal blood pressure less than 140/90       metoprolol tartrate 50 MG tablet    LOPRESSOR     Take 1 tablet (50 mg) by mouth 2 times daily    Persistent atrial fibrillation (H)       order for DME      Equipment ordered: RESMED CPAP Mask type: Full face  Settings: 15        potassium chloride 10 MEQ tablet    K-TAB,KLOR-CON    90 tablet     Take 2 tablets (20 mEq) by mouth 2 times daily    Peripheral edema       PROPECIA 1 MG tablet   Generic drug:  finasteride      Take 1 mg by mouth daily.        rivaroxaban ANTICOAGULANT 20 MG Tabs tablet    XARELTO    90 tablet    Take 1 tablet (20 mg) by mouth daily (with dinner) AVIOD USE OF ALCOHOL WHILE USING MEDICATION    Persistent atrial fibrillation (H)       senna 8.6 MG tablet    SENOKOT     Take 8.6 mg by mouth        VITAMIN B 12 PO

## 2018-01-24 NOTE — PROGRESS NOTES
HPI and Plan:   See dictation  [id number]  Orders Placed This Encounter   Procedures     Follow-Up with Cardiac Advanced Practice Provider     EKG 12-lead complete w/read - Clinics (performed today)       No orders of the defined types were placed in this encounter.      There are no discontinued medications.      Encounter Diagnosis   Name Primary?     Chronic a-fib (H) Yes       CURRENT MEDICATIONS:  Current Outpatient Prescriptions   Medication Sig Dispense Refill     furosemide (LASIX) 20 MG tablet Take 2 tablets (40 mg) by mouth daily 60 tablet 1     potassium chloride (K-TAB,KLOR-CON) 10 MEQ tablet Take 2 tablets (20 mEq) by mouth 2 times daily 90 tablet      amLODIPine (NORVASC) 5 MG tablet Take 1 tablet (5 mg) by mouth 2 times daily (Patient taking differently: Take 5 mg by mouth daily ) 180 tablet 3     metoprolol (LOPRESSOR) 50 MG tablet Take 1 tablet (50 mg) by mouth 2 times daily (Patient taking differently: Take 50 mg by mouth daily )       amiodarone (PACERONE/CODARONE) 200 MG tablet Take 1 tablet (200 mg) by mouth daily starting 11/22 60 tablet 3     losartan (COZAAR) 100 MG tablet Take 1 tablet by mouth daily 90 tablet 1     rivaroxaban ANTICOAGULANT (XARELTO) 20 MG TABS tablet Take 1 tablet (20 mg) by mouth daily (with dinner) AVIOD USE OF ALCOHOL WHILE USING MEDICATION 90 tablet PRN     acetaminophen (TYLENOL) 325 MG tablet Take 650 mg by mouth       senna (SENOKOT) 8.6 MG tablet Take 8.6 mg by mouth       Cyanocobalamin (VITAMIN B 12 PO)        order for DME Equipment ordered: RESMED CPAP Mask type: Full face  Settings: 15       allopurinol (ZYLOPRIM) 300 MG tablet        finasteride (PROPECIA) 1 MG tablet Take 1 mg by mouth daily.         ALLERGIES     Allergies   Allergen Reactions     No Known Drug Allergies        PAST MEDICAL HISTORY:  Past Medical History:   Diagnosis Date     Atrial fibrillation (H)     persistent, DCCV 12/2017, ablation 11/6/17     Essential hypertension, benign      H/O ETOH  abuse      Herpes simplex without mention of complication      Hyperlipidemia      Obesity      S/P ablation of atrial fibrillation 2017     Tobacco use disorder     dced 2004       PAST SURGICAL HISTORY:  Past Surgical History:   Procedure Laterality Date     C NONSPECIFIC PROCEDURE      achilles tendon     C NONSPECIFIC PROCEDURE      knees, arthroscopy     C NONSPECIFIC PROCEDURE      basal cell skin ca, nose     C NONSPECIFIC PROCEDURE      flex sig; colonoscopy due 3/2008     C NONSPECIFIC PROCEDURE      cardioversion for atr fib     HERNIORRHAPHY UMBILICAL  2012    Procedure: HERNIORRHAPHY UMBILICAL;  UMBILICAL HERNIA REPAIR;  Surgeon: Ra Crocker MD;  Location: [facility]     ORTHOPEDIC SURGERY         FAMILY HISTORY:  Family History   Problem Relation Age of Onset     Family History Negative Mother      HEART DISEASE Father      decesased at 42 - [place]     HEART DISEASE Sister       MI     HEART DISEASE Brother       MI     HEART DISEASE Brother      CABG AT 49     Lipids Sister      Lipids Sister      Lipids Sister      Family History Negative Brother        SOCIAL HISTORY:  Social History     Social History     Marital status: Single     Spouse name: N/A     Number of children: N/A     Years of education: N/A     Social History Main Topics     Smoking status: Former Smoker     Packs/day: 1.00     Years: 10.00     Types: Cigarettes     Quit date: 2011     Smokeless tobacco: Never Used     Alcohol use No     Drug use: No     Sexual activity: Yes     Partners: Female     Other Topics Concern     None     Social History Narrative       Review of Systems:  Skin:  Negative       Eyes:  Positive for glasses (reading only)    ENT:  Negative      Respiratory:  Positive for dyspnea on exertion     Cardiovascular:  Negative for;chest pain Positive for;lightheadedness;edema sweaty  Gastroenterology: Negative      Genitourinary:  Negative      Musculoskeletal:  Negative     "  Neurologic:  Negative      Psychiatric:  Negative      Heme/Lymph/Imm:  Negative      Endocrine:  Negative        Physical Exam:  Vitals: /89  Pulse 66  Ht 1.676 m (5' 6\")  Wt 127.5 kg (281 lb)  BMI 45.35 kg/m2    Constitutional:  cooperative, alert and oriented, well developed, well nourished, in no acute distress        Skin:  warm and dry to the touch, no apparent skin lesions or masses noted          Head:  normocephalic, no masses or lesions        Eyes:  pupils equal and round, conjunctivae and lids unremarkable, sclera white, no xanthalasma, EOMS intact, no nystagmus        Lymph:No Cervical lymphadenopathy present     ENT:           Neck:  carotid pulses are full and equal bilaterally, JVP normal, no carotid bruit        Respiratory:  normal breath sounds, clear to auscultation, normal A-P diameter, normal symmetry, normal respiratory excursion, no use of accessory muscles         Cardiac: regular rhythm, normal S1/S2, no S3 or S4, apical impulse not displaced, no murmurs, gallops or rubs bradycardic                                                       GI:  abdomen soft, non-tender, BS normoactive, no mass, no HSM, no bruits obese      Extremities and Muscular Skeletal:  no deformities, clubbing, cyanosis, erythema observed   bilateral LE edema;pitting;1+          Neurological:           Psych:  Alert and Oriented x 3        CC  No referring provider defined for this encounter.              "

## 2018-01-24 NOTE — PROGRESS NOTES
Service Date: 01/24/2018      HISTORY OF PRESENT ILLNESS:  Thank you for allowing me to participate in the care of this very delightful patient.  As you know, Abraham is a 67-year-old gentleman with a history of recurrent persistent atrial fibrillation over the past several years now in the background of hypertension, obesity, sleep apnea and preserved LV systolic function.  Initially, was on flecainide for rhythm control strategy but later on switched to rate control strategy, as his symptoms were minimal when AFib recurred.      When I saw the patient for the first time this past October, he started to be more short of breath along with increasing pedal edema.  The patient stated in retrospect he felt much better in sinus rhythm.  Therefore, I took the liberty of recommending catheter-based ablation, as he already failed flecainide in the past and I did not want to expose him to amiodarone with its potential side effects, especially in this age group.      The patient underwent isolation of the pulmonary veins and SVC in addition to empiric ablation of CTI.  He presented in atrial fibrillation at that time and after isolation of the pulmonary veins, DC cardioversion was performed and I did not elect to perform any more ablations, as I was not able to induce any other arrhythmias.  I had him on amiodarone afterwards.      Unfortunately, the patient came back with atrial fibrillation and I recommended DC cardioversion.  The patient did start drinking again a couple days after his ablation and it likely played a role of this recurrence as well.  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      Abraham is doing much better with less shortness of breath and pedal edema.  He is looking forward to have his knee surgery in which his limiting factor is exercising.  I emphasized the importance of alcohol cessation in terms of for his health overall and to just increases success of ablation.  Also, counseled about weight loss as well.  For  now, will continue all medications including amiodarone, metoprolol, Xarelto and have him come back in about 3 months' time for reassessment.  If he is doing quite well at that time, we may consider to take off his amiodarone.  EKG today demonstrates sinus bradycardia, rate 46 beats per minute.  The patient seemed to tolerate the bradycardia without any symptoms of lightheadedness or decreased exercise tolerance.      cc:   Johnny Kay MD    [facility]    [address] [place] [phone]         KIKE PRIETO MD             D: 2018   T: 2018   MT: JOMAR      Name:     TOO HOWARD   MRN:      [id number]        Account:      [id number]   :      1950           Service Date: 2018      Document: [id number]

## 2018-01-31 ENCOUNTER — APPOINTMENT (OUTPATIENT)
Dept: SLEEP MEDICINE | Facility: CLINIC | Age: 68
End: 2018-01-31
Payer: COMMERCIAL

## 2018-02-15 ENCOUNTER — OFFICE VISIT (OUTPATIENT)
Dept: CARDIOLOGY | Facility: CLINIC | Age: 68
End: 2018-02-15
Payer: COMMERCIAL

## 2018-02-15 VITALS
HEART RATE: 51 BPM | BODY MASS INDEX: 45.96 KG/M2 | SYSTOLIC BLOOD PRESSURE: 146 MMHG | DIASTOLIC BLOOD PRESSURE: 90 MMHG | HEIGHT: 66 IN | WEIGHT: 286 LBS

## 2018-02-15 DIAGNOSIS — Z82.49 FAMILY HISTORY OF ISCHEMIC HEART DISEASE: ICD-10-CM

## 2018-02-15 DIAGNOSIS — I48.0 PAROXYSMAL ATRIAL FIBRILLATION (H): Primary | ICD-10-CM

## 2018-02-15 PROCEDURE — 99214 OFFICE O/P EST MOD 30 MIN: CPT | Performed by: INTERNAL MEDICINE

## 2018-02-15 NOTE — MR AVS SNAPSHOT
After Visit Summary   2/15/2018    Mike Schultz    MRN: [id number]           Patient Information     Date Of Birth          1950        Visit Information        Provider Department      2/15/2018 8:45 AM Lovely Hammond MD [facility]        Today's Diagnoses     Paroxysmal atrial fibrillation (H)    -  1       Follow-ups after your visit        Additional Services     Follow-Up with Cardiologist                 Your next 10 appointments already scheduled     Mar 02, 2018  8:30 AM CST   Pre-Op physical with Johnny Kay MD   [facility]    [address] [id number] [phone]              Future tests that were ordered for you today     Open Future Orders        Priority Expected Expires Ordered    Follow-Up with Cardiologist Routine 3/17/2018 2/15/2019 2/15/2018            Who to contact     If you have questions or need follow up information about today's clinic visit or your schedule please contact [facility] directly at [phone].  Normal or non-critical lab and imaging results will be communicated to you by Dispatchhart, letter or phone within 4 business days after the clinic has received the results. If you do not hear from us within 7 days, please contact the clinic through [email or web address] or phone. If you have a critical or abnormal lab result, we will notify you by phone as soon as possible.  Submit refill requests through for; to (do) Centers or call your pharmacy and they will forward the refill request to us. Please allow 3 business days for your refill to be completed.          Additional Information About Your Visit        MyChart Information     for; to (do) Centers gives you secure access to your electronic health record. If you see a primary care provider, you can also send messages to your care team and make appointments. If you have  "questions, please call your primary care clinic.  If you do not have a primary care provider, please call [phone] and they will assist you.        Care EveryWhere ID     This is your Care EveryWhere ID. This could be used by other organizations to access your [facility] medical records  [id number]        Your Vitals Were     Pulse Height BMI (Body Mass Index)             51 1.676 m (5' 6\") 46.16 kg/m2          Blood Pressure from Last 3 Encounters:   02/15/18 146/90   01/24/18 132/89   12/19/17 126/84    Weight from Last 3 Encounters:   02/15/18 129.7 kg (286 lb)   01/24/18 127.5 kg (281 lb)   12/15/17 132 kg (291 lb)                 Today's Medication Changes          These changes are accurate as of 2/15/18 10:00 AM.  If you have any questions, ask your nurse or doctor.               These medicines have changed or have updated prescriptions.        Dose/Directions    amLODIPine 5 MG tablet   Commonly known as:  NORVASC   This may have changed:  when to take this   Used for:  Essential hypertension        Dose:  5 mg   Take 1 tablet (5 mg) by mouth 2 times daily   Quantity:  180 tablet   Refills:  3       metoprolol tartrate 50 MG tablet   Commonly known as:  LOPRESSOR   This may have changed:  when to take this   Used for:  Persistent atrial fibrillation (H)        Dose:  50 mg   Take 1 tablet (50 mg) by mouth 2 times daily   Refills:  0                Primary Care Provider Office Phone # Fax #    Johnny Santo Kay [phone]       [address] [place] [phone]        Equal Access to Services     [facility] : Hadii radha trejo hadasho Soomaali, waaxda luqadaha, qaybta kaalmada adeegnam, kaykay olea. So [facility] [phone].    ATENCIÓN: Si habla español, tiene a hightower disposición servicios gratuitos de asistencia lingüística. Llame al [phone].    We comply with applicable federal civil rights laws and [place] laws. We do not discriminate on the basis " of race, color, national origin, age, disability, sex, sexual orientation, or gender identity.            Thank you!     Thank you for choosing [facility]  for your care. Our goal is always to provide you with excellent care. Hearing back from our patients is one way we can continue to improve our services. Please take a few minutes to complete the written survey that you may receive in the mail after your visit with us. Thank you!             Your Updated Medication List - Protect others around you: Learn how to safely use, store and throw away your medicines at www.disposemymeds.org.          This list is accurate as of 2/15/18 10:00 AM.  Always use your most recent med list.                   Brand Name Dispense Instructions for use Diagnosis    acetaminophen 325 MG tablet    TYLENOL     Take 650 mg by mouth        allopurinol 300 MG tablet    ZYLOPRIM      Essential hypertension with goal blood pressure less than 140/90       amiodarone 200 MG tablet    PACERONE/CODARONE    60 tablet    Take 1 tablet (200 mg) by mouth daily starting 11/22    Persistent atrial fibrillation (H)       amLODIPine 5 MG tablet    NORVASC    180 tablet    Take 1 tablet (5 mg) by mouth 2 times daily    Essential hypertension       LASIX 20 MG tablet   Generic drug:  furosemide     60 tablet    Take 2 tablets (40 mg) by mouth daily    Peripheral edema       losartan 100 MG tablet    COZAAR    90 tablet    Take 1 tablet by mouth daily    Essential hypertension with goal blood pressure less than 140/90       metoprolol tartrate 50 MG tablet    LOPRESSOR     Take 1 tablet (50 mg) by mouth 2 times daily    Persistent atrial fibrillation (H)       order for DME      Equipment ordered: RESMED CPAP Mask type: Full face  Settings: 15        potassium chloride 10 MEQ tablet    K-TAB,KLOR-CON    90 tablet    Take 2 tablets (20 mEq) by mouth 2 times daily    Peripheral edema       PROPECIA 1 MG tablet    Generic drug:  finasteride      Take 1 mg by mouth daily.        rivaroxaban ANTICOAGULANT 20 MG Tabs tablet    XARELTO    90 tablet    Take 1 tablet (20 mg) by mouth daily (with dinner) AVIOD USE OF ALCOHOL WHILE USING MEDICATION    Persistent atrial fibrillation (H)       senna 8.6 MG tablet    SENOKOT     Take 8.6 mg by mouth        VITAMIN B 12 PO

## 2018-02-15 NOTE — Clinical Note
2/15/2018    Johnny Kay MD  [address] [facility] [address]    RE: Mike Schultz       Dear Colleague,    I had the pleasure of seeing Mike Schultz in the [facility].    HPI and Plan:    HISTORY OF PRESENT ILLNESS:  Mr. Mike Schultz is a 67-year-old man with a history of recurrent persistent atrial fibrillation in the background of hypertension, obesity, sleep apnea and preserved LV systolic function.  He was initially was on rhythm control strategy with flecainide but eventually developed recurrent atrial fibrillation without traditional symptoms.  Therefore, he was recommended on rate control strategy.      I met him several weeks ago and performed his cardioversion. He denies palpitations. He stopped alcohol use last fall and admits it was considerable.  He uses CPAP for his sleep apnea. CPAP mask was just replaced and he sleeps well.    He has a strong family history of heart disease. His sister  at age 43 years of a stroke but was using nitroglycerin prior to her death. His younger brother has undergone coronary artery bypass graft surgery. His older brother is doing well. He was previously on low-dose aspirin until he started on full anticoagulation. His lipids are good with a normal HDL and a normal LDL fraction.    He denies any chest, neck, arm or back discomfort. He denies new shortness of breath. He had a knee replacement surgery performed last year. He is planning to undergo knee replacement surgery for the other leg in 2 months. He will have the surgery with Dr. Suarez at [facility].     This past October, he was feeling more short of breath, orthopnea and in retrospect stated he felt much better when in sinus rhythm.  I discussed at length about the option of antiarrhythmic drug versus an ablation.  The patient opted for ablation, which was performed about 6 weeks ago.  The patient underwent PVI.  The patient presented in atrial  "fibrillation at that time, and after PVI a DC cardioversion was performed.  There was no inducible atrial fibrillation afterward.  I also isolated the SVC and did empiric ablation of his CTI as well.      He remains on amiodarone and he patient has had cardioversions in the past as well as that most recently performed by myself.      The patient  has experienced weight gain which and has some pedal edema.     Exam:  Blood pressure 146/90, pulse 51, height 1.676 m (5' 6\"), weight 129.7 kg (286 lb).      In assessment,            Orders Placed This Encounter   Procedures     NM Lexiscan stress test (nuc card)     Follow-Up with Cardiologist       No orders of the defined types were placed in this encounter.      There are no discontinued medications.      Encounter Diagnoses   Name Primary?     Paroxysmal atrial fibrillation (H) Yes     Family history of ischemic heart disease        CURRENT MEDICATIONS:  Current Outpatient Prescriptions   Medication Sig Dispense Refill     furosemide (LASIX) 20 MG tablet Take 2 tablets (40 mg) by mouth daily 60 tablet 1     potassium chloride (K-TAB,KLOR-CON) 10 MEQ tablet Take 2 tablets (20 mEq) by mouth 2 times daily 90 tablet      amLODIPine (NORVASC) 5 MG tablet Take 1 tablet (5 mg) by mouth 2 times daily (Patient taking differently: Take 5 mg by mouth daily ) 180 tablet 3     metoprolol (LOPRESSOR) 50 MG tablet Take 1 tablet (50 mg) by mouth 2 times daily (Patient taking differently: Take 50 mg by mouth daily )       amiodarone (PACERONE/CODARONE) 200 MG tablet Take 1 tablet (200 mg) by mouth daily starting 11/22 60 tablet 3     losartan (COZAAR) 100 MG tablet Take 1 tablet by mouth daily 90 tablet 1     rivaroxaban ANTICOAGULANT (XARELTO) 20 MG TABS tablet Take 1 tablet (20 mg) by mouth daily (with dinner) AVIOD USE OF ALCOHOL WHILE USING MEDICATION 90 tablet PRN     acetaminophen (TYLENOL) 325 MG tablet Take 650 mg by mouth       senna (SENOKOT) 8.6 MG tablet Take 8.6 mg by " mouth       Cyanocobalamin (VITAMIN B 12 PO)        order for DME Equipment ordered: RESMED CPAP Mask type: Full face  Settings: 15       allopurinol (ZYLOPRIM) 300 MG tablet        finasteride (PROPECIA) 1 MG tablet Take 1 mg by mouth daily.         ALLERGIES     Allergies   Allergen Reactions     No Known Drug Allergies        PAST MEDICAL HISTORY:  Past Medical History:   Diagnosis Date     Atrial fibrillation (H)     persistent, DCCV 2017, ablation 17     Essential hypertension, benign      H/O ETOH abuse      Herpes simplex without mention of complication      Hyperlipidemia      Obesity      S/P ablation of atrial fibrillation 2017     Tobacco use disorder     dced 2004       PAST SURGICAL HISTORY:  Past Surgical History:   Procedure Laterality Date     C NONSPECIFIC PROCEDURE      achilles tendon     C NONSPECIFIC PROCEDURE      knees, arthroscopy     C NONSPECIFIC PROCEDURE      basal cell skin ca, nose     C NONSPECIFIC PROCEDURE      flex sig; colonoscopy due 3/2008     C NONSPECIFIC PROCEDURE      cardioversion for atr fib     HERNIORRHAPHY UMBILICAL  2012    Procedure: HERNIORRHAPHY UMBILICAL;  UMBILICAL HERNIA REPAIR;  Surgeon: Ra Crocker MD;  Location: [facility]     ORTHOPEDIC SURGERY         FAMILY HISTORY:  Family History   Problem Relation Age of Onset     Family History Negative Mother      HEART DISEASE Father      decesased at 42 - [place]     HEART DISEASE Sister       MI     HEART DISEASE Brother       MI     HEART DISEASE Brother      CABG AT 49     Lipids Sister      Lipids Sister      Lipids Sister      Family History Negative Brother        SOCIAL HISTORY:  Social History     Social History     Marital status: Single     Spouse name: N/A     Number of children: N/A     Years of education: N/A     Social History Main Topics     Smoking status: Former Smoker     Packs/day: 1.00     Years: 10.00     Types: Cigarettes     Quit date: 2011      "Smokeless tobacco: Never Used     Alcohol use No     Drug use: No     Sexual activity: Yes     Partners: Female     Other Topics Concern     None     Social History Narrative       Review of Systems:  Skin:  Negative       Eyes:  Positive for glasses (reading only)    ENT:  Negative      Respiratory:  Positive for dyspnea on exertion     Cardiovascular:  Negative for;chest pain Positive for;lightheadedness;edema sweaty  Gastroenterology: Negative      Genitourinary:  Negative      Musculoskeletal:  Negative      Neurologic:  Negative      Psychiatric:  Negative      Heme/Lymph/Imm:  Negative      Endocrine:  Negative        Physical Exam:  Vitals: /90  Pulse 51  Ht 1.676 m (5' 6\")  Wt 129.7 kg (286 lb)  BMI 46.16 kg/m2    Constitutional:  cooperative;well nourished;in no acute distress   increased BMI    Skin:  warm and dry to the touch, no apparent skin lesions or masses noted          Head:  normocephalic        Eyes:           Lymph:      ENT:           Neck:           Respiratory:  normal breath sounds, clear to auscultation, normal A-P diameter, normal symmetry, normal respiratory excursion, no use of accessory muscles         Cardiac: regular rhythm, normal S1/S2, no S3 or S4, apical impulse not displaced, no murmurs, gallops or rubs                                                         GI:           Extremities and Muscular Skeletal:      bilateral LE edema;trace          Neurological:  affect appropriate   mild limp    Psych:  Alert and Oriented x 3;affect appropriate, oriented to time, person and place          CC  No referring provider defined for this encounter.                    Thank you for allowing me to participate in the care of your patient.      Sincerely,     Lovely Hammond MD     [facility]    cc:   No referring provider defined for this encounter.      "

## 2018-02-15 NOTE — LETTER
2/15/2018    Johnny Kay MD  [address] [facility] [address]    RE: Mike Schultz       Dear Colleague,    I had the pleasure of seeing Mike Schultz in the [facility].    HPI and Plan:    HISTORY OF PRESENT ILLNESS:  Mr. Mike Schultz is a 67-year-old man with a history of recurrent persistent atrial fibrillation in the background of hypertension, obesity, sleep apnea and preserved LV systolic function.  He was initially was on rhythm control strategy with flecainide but eventually developed recurrent atrial fibrillation without traditional symptoms.  Therefore, he was recommended on rate control strategy.      I met him several weeks ago and performed his cardioversion. He denies palpitations. He stopped alcohol use last fall and admits it was considerable.  He uses CPAP for his sleep apnea. CPAP mask was just replaced and he sleeps well.    He has a strong family history of heart disease. His sister  at age 43 years of a stroke but was using nitroglycerin prior to her death. His younger brother has undergone coronary artery bypass graft surgery. His older brother is doing well. He was previously on low-dose aspirin until he started on full anticoagulation. His lipids are good with a normal HDL and a normal LDL fraction.    He denies any chest, neck, arm or back discomfort. He denies new shortness of breath. He had a knee replacement surgery performed last year. He is planning to undergo knee replacement surgery for the other leg in 2 months. He will have the surgery with Dr. Suarez at [facility].     This past October, he was feeling more short of breath, orthopnea and in retrospect stated he felt much better when in sinus rhythm.  I discussed at length about the option of antiarrhythmic drug versus an ablation.  The patient opted for ablation, which was performed about 6 weeks ago.  The patient underwent PVI.  The patient presented in atrial  "fibrillation at that time, and after PVI a DC cardioversion was performed.  There was no inducible atrial fibrillation afterward.  I also isolated the SVC and did empiric ablation of his CTI as well.      He remains on amiodarone and he patient has had cardioversions in the past as well as that most recently performed by myself.      The patient  has experienced weight gain which and has some pedal edema.     Exam:  Blood pressure 146/90, pulse 51, height 1.676 m (5' 6\"), weight 129.7 kg (286 lb).      In assessment,            Orders Placed This Encounter   Procedures     NM Lexiscan stress test (nuc card)     Follow-Up with Cardiologist       No orders of the defined types were placed in this encounter.      There are no discontinued medications.      Encounter Diagnoses   Name Primary?     Paroxysmal atrial fibrillation (H) Yes     Family history of ischemic heart disease        CURRENT MEDICATIONS:  Current Outpatient Prescriptions   Medication Sig Dispense Refill     furosemide (LASIX) 20 MG tablet Take 2 tablets (40 mg) by mouth daily 60 tablet 1     potassium chloride (K-TAB,KLOR-CON) 10 MEQ tablet Take 2 tablets (20 mEq) by mouth 2 times daily 90 tablet      amLODIPine (NORVASC) 5 MG tablet Take 1 tablet (5 mg) by mouth 2 times daily (Patient taking differently: Take 5 mg by mouth daily ) 180 tablet 3     metoprolol (LOPRESSOR) 50 MG tablet Take 1 tablet (50 mg) by mouth 2 times daily (Patient taking differently: Take 50 mg by mouth daily )       amiodarone (PACERONE/CODARONE) 200 MG tablet Take 1 tablet (200 mg) by mouth daily starting 11/22 60 tablet 3     losartan (COZAAR) 100 MG tablet Take 1 tablet by mouth daily 90 tablet 1     rivaroxaban ANTICOAGULANT (XARELTO) 20 MG TABS tablet Take 1 tablet (20 mg) by mouth daily (with dinner) AVIOD USE OF ALCOHOL WHILE USING MEDICATION 90 tablet PRN     acetaminophen (TYLENOL) 325 MG tablet Take 650 mg by mouth       senna (SENOKOT) 8.6 MG tablet Take 8.6 mg by " mouth       Cyanocobalamin (VITAMIN B 12 PO)        order for DME Equipment ordered: RESMED CPAP Mask type: Full face  Settings: 15       allopurinol (ZYLOPRIM) 300 MG tablet        finasteride (PROPECIA) 1 MG tablet Take 1 mg by mouth daily.         ALLERGIES     Allergies   Allergen Reactions     No Known Drug Allergies        PAST MEDICAL HISTORY:  Past Medical History:   Diagnosis Date     Atrial fibrillation (H)     persistent, DCCV 2017, ablation 17     Essential hypertension, benign      H/O ETOH abuse      Herpes simplex without mention of complication      Hyperlipidemia      Obesity      S/P ablation of atrial fibrillation 2017     Tobacco use disorder     dced 2004       PAST SURGICAL HISTORY:  Past Surgical History:   Procedure Laterality Date     C NONSPECIFIC PROCEDURE      achilles tendon     C NONSPECIFIC PROCEDURE      knees, arthroscopy     C NONSPECIFIC PROCEDURE      basal cell skin ca, nose     C NONSPECIFIC PROCEDURE      flex sig; colonoscopy due 3/2008     C NONSPECIFIC PROCEDURE      cardioversion for atr fib     HERNIORRHAPHY UMBILICAL  2012    Procedure: HERNIORRHAPHY UMBILICAL;  UMBILICAL HERNIA REPAIR;  Surgeon: Ra Crocker MD;  Location: [facility]     ORTHOPEDIC SURGERY         FAMILY HISTORY:  Family History   Problem Relation Age of Onset     Family History Negative Mother      HEART DISEASE Father      decesased at 42 - [place]     HEART DISEASE Sister       MI     HEART DISEASE Brother       MI     HEART DISEASE Brother      CABG AT 49     Lipids Sister      Lipids Sister      Lipids Sister      Family History Negative Brother        SOCIAL HISTORY:  Social History     Social History     Marital status: Single     Spouse name: N/A     Number of children: N/A     Years of education: N/A     Social History Main Topics     Smoking status: Former Smoker     Packs/day: 1.00     Years: 10.00     Types: Cigarettes     Quit date: 2011      "Smokeless tobacco: Never Used     Alcohol use No     Drug use: No     Sexual activity: Yes     Partners: Female     Other Topics Concern     None     Social History Narrative       Review of Systems:  Skin:  Negative       Eyes:  Positive for glasses (reading only)    ENT:  Negative      Respiratory:  Positive for dyspnea on exertion     Cardiovascular:  Negative for;chest pain Positive for;lightheadedness;edema sweaty  Gastroenterology: Negative      Genitourinary:  Negative      Musculoskeletal:  Negative      Neurologic:  Negative      Psychiatric:  Negative      Heme/Lymph/Imm:  Negative      Endocrine:  Negative        Physical Exam:  Vitals: /90  Pulse 51  Ht 1.676 m (5' 6\")  Wt 129.7 kg (286 lb)  BMI 46.16 kg/m2    Constitutional:  cooperative;well nourished;in no acute distress   increased BMI    Skin:  warm and dry to the touch, no apparent skin lesions or masses noted          Head:  normocephalic        Eyes:           Lymph:      ENT:           Neck:           Respiratory:  normal breath sounds, clear to auscultation, normal A-P diameter, normal symmetry, normal respiratory excursion, no use of accessory muscles         Cardiac: regular rhythm, normal S1/S2, no S3 or S4, apical impulse not displaced, no murmurs, gallops or rubs                                                         GI:           Extremities and Muscular Skeletal:      bilateral LE edema;trace          Neurological:  affect appropriate   mild limp    Psych:  Alert and Oriented x 3;affect appropriate, oriented to time, person and place      Thank you for allowing me to participate in the care of your patient.    Sincerely,     Lovely Hammond MD     [facility]    "

## 2018-02-15 NOTE — PROGRESS NOTES
HPI and Plan:    HISTORY OF PRESENT ILLNESS:  Mr. Mike Schultz is a 67-year-old man with a history of recurrent persistent atrial fibrillation in the background of hypertension, obesity, sleep apnea and preserved LV systolic function.  He was initially was on rhythm control strategy with flecainide but eventually developed recurrent atrial fibrillation without traditional symptoms.  Therefore, he was recommended on rate control strategy.      I met him several weeks ago and performed his cardioversion. He denies palpitations. He stopped alcohol use last fall and admits it was considerable.  He uses CPAP for his sleep apnea. CPAP mask was just replaced and he sleeps well.    He has a strong family history of heart disease. His sister  at age 43 years of a stroke but was using nitroglycerin prior to her death. His younger brother has undergone coronary artery bypass graft surgery. His older brother is doing well. He was previously on low-dose aspirin until he started on full anticoagulation. His lipids are good with a normal HDL and a normal LDL fraction.    He denies any chest, neck, arm or back discomfort. He denies new shortness of breath. He had a knee replacement surgery performed last year. He is planning to undergo knee replacement surgery for the other leg in 2 months. He will have the surgery with Dr. Suarez at [facility].     This past October, he was feeling more short of breath, orthopnea and in retrospect stated he felt much better when in sinus rhythm.  I discussed at length about the option of antiarrhythmic drug versus an ablation.  The patient opted for ablation, which was performed about 6 weeks ago.  The patient underwent PVI.  The patient presented in atrial fibrillation at that time, and after PVI a DC cardioversion was performed.  There was no inducible atrial fibrillation afterward.  I also isolated the SVC and did empiric ablation of his CTI as well.      He remains on amiodarone  "and he patient has had cardioversions in the past as well as that most recently performed by myself.      The patient  has experienced weight gain which and has some pedal edema.     Exam:  Blood pressure 146/90, pulse 51, height 1.676 m (5' 6\"), weight 129.7 kg (286 lb).      In assessment,            Orders Placed This Encounter   Procedures     NM Lexiscan stress test (nuc card)     Follow-Up with Cardiologist       No orders of the defined types were placed in this encounter.      There are no discontinued medications.      Encounter Diagnoses   Name Primary?     Paroxysmal atrial fibrillation (H) Yes     Family history of ischemic heart disease        CURRENT MEDICATIONS:  Current Outpatient Prescriptions   Medication Sig Dispense Refill     furosemide (LASIX) 20 MG tablet Take 2 tablets (40 mg) by mouth daily 60 tablet 1     potassium chloride (K-TAB,KLOR-CON) 10 MEQ tablet Take 2 tablets (20 mEq) by mouth 2 times daily 90 tablet      amLODIPine (NORVASC) 5 MG tablet Take 1 tablet (5 mg) by mouth 2 times daily (Patient taking differently: Take 5 mg by mouth daily ) 180 tablet 3     metoprolol (LOPRESSOR) 50 MG tablet Take 1 tablet (50 mg) by mouth 2 times daily (Patient taking differently: Take 50 mg by mouth daily )       amiodarone (PACERONE/CODARONE) 200 MG tablet Take 1 tablet (200 mg) by mouth daily starting 11/22 60 tablet 3     losartan (COZAAR) 100 MG tablet Take 1 tablet by mouth daily 90 tablet 1     rivaroxaban ANTICOAGULANT (XARELTO) 20 MG TABS tablet Take 1 tablet (20 mg) by mouth daily (with dinner) AVIOD USE OF ALCOHOL WHILE USING MEDICATION 90 tablet PRN     acetaminophen (TYLENOL) 325 MG tablet Take 650 mg by mouth       senna (SENOKOT) 8.6 MG tablet Take 8.6 mg by mouth       Cyanocobalamin (VITAMIN B 12 PO)        order for DME Equipment ordered: RESMED CPAP Mask type: Full face  Settings: 15       allopurinol (ZYLOPRIM) 300 MG tablet        finasteride (PROPECIA) 1 MG tablet Take 1 mg by " mouth daily.         ALLERGIES     Allergies   Allergen Reactions     No Known Drug Allergies        PAST MEDICAL HISTORY:  Past Medical History:   Diagnosis Date     Atrial fibrillation (H)     persistent, DCCV 2017, ablation 17     Essential hypertension, benign      H/O ETOH abuse      Herpes simplex without mention of complication      Hyperlipidemia      Obesity      S/P ablation of atrial fibrillation 2017     Tobacco use disorder     dced 2004       PAST SURGICAL HISTORY:  Past Surgical History:   Procedure Laterality Date     C NONSPECIFIC PROCEDURE      achilles tendon     C NONSPECIFIC PROCEDURE      knees, arthroscopy     C NONSPECIFIC PROCEDURE      basal cell skin ca, nose     C NONSPECIFIC PROCEDURE      flex sig; colonoscopy due 3/2008     C NONSPECIFIC PROCEDURE      cardioversion for atr fib     HERNIORRHAPHY UMBILICAL  2012    Procedure: HERNIORRHAPHY UMBILICAL;  UMBILICAL HERNIA REPAIR;  Surgeon: Ra Crocker MD;  Location: [facility]     ORTHOPEDIC SURGERY         FAMILY HISTORY:  Family History   Problem Relation Age of Onset     Family History Negative Mother      HEART DISEASE Father      decesased at 42 - [place]     HEART DISEASE Sister       MI     HEART DISEASE Brother       MI     HEART DISEASE Brother      CABG AT 49     Lipids Sister      Lipids Sister      Lipids Sister      Family History Negative Brother        SOCIAL HISTORY:  Social History     Social History     Marital status: Single     Spouse name: N/A     Number of children: N/A     Years of education: N/A     Social History Main Topics     Smoking status: Former Smoker     Packs/day: 1.00     Years: 10.00     Types: Cigarettes     Quit date: 2011     Smokeless tobacco: Never Used     Alcohol use No     Drug use: No     Sexual activity: Yes     Partners: Female     Other Topics Concern     None     Social History Narrative       Review of Systems:  Skin:  Negative       Eyes:   "Positive for glasses (reading only)    ENT:  Negative      Respiratory:  Positive for dyspnea on exertion     Cardiovascular:  Negative for;chest pain Positive for;lightheadedness;edema sweaty  Gastroenterology: Negative      Genitourinary:  Negative      Musculoskeletal:  Negative      Neurologic:  Negative      Psychiatric:  Negative      Heme/Lymph/Imm:  Negative      Endocrine:  Negative        Physical Exam:  Vitals: /90  Pulse 51  Ht 1.676 m (5' 6\")  Wt 129.7 kg (286 lb)  BMI 46.16 kg/m2    Constitutional:  cooperative;well nourished;in no acute distress   increased BMI    Skin:  warm and dry to the touch, no apparent skin lesions or masses noted          Head:  normocephalic        Eyes:           Lymph:      ENT:           Neck:           Respiratory:  normal breath sounds, clear to auscultation, normal A-P diameter, normal symmetry, normal respiratory excursion, no use of accessory muscles         Cardiac: regular rhythm, normal S1/S2, no S3 or S4, apical impulse not displaced, no murmurs, gallops or rubs                                                         GI:           Extremities and Muscular Skeletal:      bilateral LE edema;trace          Neurological:  affect appropriate   mild limp    Psych:  Alert and Oriented x 3;affect appropriate, oriented to time, person and place          CC  No referring provider defined for this encounter.                  "

## 2018-03-02 ENCOUNTER — OFFICE VISIT (OUTPATIENT)
Dept: INTERNAL MEDICINE | Facility: CLINIC | Age: 68
End: 2018-03-02
Payer: COMMERCIAL

## 2018-03-02 VITALS
BODY MASS INDEX: 44.31 KG/M2 | OXYGEN SATURATION: 94 % | WEIGHT: 282.3 LBS | SYSTOLIC BLOOD PRESSURE: 138 MMHG | DIASTOLIC BLOOD PRESSURE: 78 MMHG | RESPIRATION RATE: 18 BRPM | HEART RATE: 48 BPM | TEMPERATURE: 98.5 F | HEIGHT: 67 IN

## 2018-03-02 DIAGNOSIS — I87.2 VENOUS INSUFFICIENCY: ICD-10-CM

## 2018-03-02 DIAGNOSIS — M17.12 OSTEOARTHRITIS OF LEFT KNEE, UNSPECIFIED OSTEOARTHRITIS TYPE: ICD-10-CM

## 2018-03-02 DIAGNOSIS — Z11.59 NEED FOR HEPATITIS C SCREENING TEST: ICD-10-CM

## 2018-03-02 DIAGNOSIS — M10.9 GOUT OF FOOT, UNSPECIFIED CAUSE, UNSPECIFIED CHRONICITY, UNSPECIFIED LATERALITY: ICD-10-CM

## 2018-03-02 DIAGNOSIS — G47.33 OSA (OBSTRUCTIVE SLEEP APNEA): ICD-10-CM

## 2018-03-02 DIAGNOSIS — F10.20 ALCOHOLISM (H): ICD-10-CM

## 2018-03-02 DIAGNOSIS — R73.01 IMPAIRED FASTING GLUCOSE: ICD-10-CM

## 2018-03-02 DIAGNOSIS — I10 ESSENTIAL HYPERTENSION: ICD-10-CM

## 2018-03-02 DIAGNOSIS — I48.0 PAROXYSMAL ATRIAL FIBRILLATION (H): ICD-10-CM

## 2018-03-02 DIAGNOSIS — R60.0 PERIPHERAL EDEMA: ICD-10-CM

## 2018-03-02 DIAGNOSIS — Z23 NEED FOR PROPHYLACTIC VACCINATION AGAINST STREPTOCOCCUS PNEUMONIAE (PNEUMOCOCCUS): ICD-10-CM

## 2018-03-02 DIAGNOSIS — Z01.818 PREOP GENERAL PHYSICAL EXAM: Primary | ICD-10-CM

## 2018-03-02 LAB
ANION GAP SERPL CALCULATED.3IONS-SCNC: 2 MMOL/L (ref 3–14)
BUN SERPL-MCNC: 19 MG/DL (ref 7–30)
CALCIUM SERPL-MCNC: 9.1 MG/DL (ref 8.5–10.1)
CHLORIDE SERPL-SCNC: 105 MMOL/L (ref 94–109)
CO2 SERPL-SCNC: 33 MMOL/L (ref 20–32)
CREAT SERPL-MCNC: 1.12 MG/DL (ref 0.66–1.25)
GFR SERPL CREATININE-BSD FRML MDRD: 66 ML/MIN/1.7M2
GLUCOSE SERPL-MCNC: 96 MG/DL (ref 70–99)
MRSA DNA SPEC QL NAA+PROBE: NEGATIVE
POTASSIUM SERPL-SCNC: 4.3 MMOL/L (ref 3.4–5.3)
SODIUM SERPL-SCNC: 140 MMOL/L (ref 133–144)
SPECIMEN SOURCE: NORMAL

## 2018-03-02 PROCEDURE — 87641 MR-STAPH DNA AMP PROBE: CPT | Performed by: INTERNAL MEDICINE

## 2018-03-02 PROCEDURE — 99215 OFFICE O/P EST HI 40 MIN: CPT | Mod: 25 | Performed by: INTERNAL MEDICINE

## 2018-03-02 PROCEDURE — G0009 ADMIN PNEUMOCOCCAL VACCINE: HCPCS | Performed by: INTERNAL MEDICINE

## 2018-03-02 PROCEDURE — 36415 COLL VENOUS BLD VENIPUNCTURE: CPT | Performed by: INTERNAL MEDICINE

## 2018-03-02 PROCEDURE — 93000 ELECTROCARDIOGRAM COMPLETE: CPT | Performed by: INTERNAL MEDICINE

## 2018-03-02 PROCEDURE — 80048 BASIC METABOLIC PNL TOTAL CA: CPT | Performed by: INTERNAL MEDICINE

## 2018-03-02 PROCEDURE — 86803 HEPATITIS C AB TEST: CPT | Performed by: INTERNAL MEDICINE

## 2018-03-02 PROCEDURE — 90670 PCV13 VACCINE IM: CPT | Performed by: INTERNAL MEDICINE

## 2018-03-02 RX ORDER — METOPROLOL TARTRATE 50 MG
50 TABLET ORAL DAILY
COMMUNITY
Start: 2018-03-02 | End: 2019-01-10

## 2018-03-02 RX ORDER — AMLODIPINE BESYLATE 5 MG/1
5 TABLET ORAL DAILY
COMMUNITY
Start: 2018-03-02 | End: 2018-04-06

## 2018-03-02 RX ORDER — POTASSIUM CHLORIDE 750 MG/1
20 TABLET, EXTENDED RELEASE ORAL DAILY
COMMUNITY
Start: 2018-03-02 | End: 2019-02-11

## 2018-03-02 NOTE — PATIENT INSTRUCTIONS
Before Your Surgery      Call your surgeon if there is any change in your health. This includes signs of a cold or flu (such as a sore throat, runny nose, cough, rash or fever).    Do not smoke, drink alcohol or take over the counter medicine (unless your surgeon or primary care doctor tells you to) for the 24 hours before and after surgery.    If you take prescribed drugs: Follow your doctor s orders about which medicines to take and which to stop until after surgery.    Eating and drinking prior to surgery: follow the instructions from your surgeon    Take a shower or bath the night before surgery. Use the soap your surgeon gave you to gently clean your skin. If you do not have soap from your surgeon, use your regular soap. Do not shave or scrub the surgery site.  Wear clean pajamas and have clean sheets on your bed.       1.  Take no scheduled medications on the day of surgery  2.  Start wearing knee high support hose once again, daily.  3.  XARELTO: stop medication a full 2 days before surgery  4.  Await lab results, to make decision on any medication changes

## 2018-03-02 NOTE — PROGRESS NOTES
[address] [address]  [phone]  Dept: [phone]    PRE-OP EVALUATION:  Today's date: 3/2/2018    Mike Schultz (: 1950) presents for pre-operative evaluation assessment as requested by Dr. Malick Suarez.  He requires evaluation and anesthesia risk assessment prior to undergoing left total knee arthroplasty  for treatment of Left knee pain .    Surgery Date:   3/16 at 7:30 am at [facility]  Fax number for surgical facility: [phone]   Primary Physician: Johnny Kay  Type of Anesthesia Anticipated: General    Patient has a Health Care Directive or Living Will:  YES will bring a copy to clinic    Preop Questions 3/2/2018   Who is doing your surgery? dr Malick Suarez   What are you having done? left knee replacement   Date of Surgery/Procedure: 2018   Facility or Hospital where procedure/surgery will be performed: -   1.  Do you have a history of Heart attack, stroke, stent, coronary bypass surgery, or other heart surgery? No   2.  Do you ever have any pain or discomfort in your chest? No   3.  Do you have a history of  Heart Failure? No   4.   Are you troubled by shortness of breath when:  walking on a level surface, or up a slight hill, or at night? No   5.  Do you currently have a cold, bronchitis or other respiratory infection? No   6.  Do you have a cough, shortness of breath, or wheezing? No   7.  Do you sometimes get pains in the calves of your legs when you walk? No   8. Do you or anyone in your family have previous history of blood clots? YES - brother    9.  Do you or does anyone in your family have a serious bleeding problem such as prolonged bleeding following surgeries or cuts? No   10. Have you ever had problems with anemia or been told to take iron pills? No   11. Have you had any abnormal blood loss such as black, tarry or bloody stools? No   12. Have you ever had a blood transfusion? No   13.  Have you or any of your relatives ever had problems with anesthesia? No   14. Do you have sleep apnea, excessive snoring or daytime drowsiness? YES - sleep apnea-wears CPAP   15. Do you have any prosthetic heart valves? No   16. Do you have prosthetic joints? No         HPI:     HPI related to upcoming procedure: pain and end stage DJD left knee, failing conservative therapy.    Other issues:  1.  Follow-up atrial fib.   Patient is now status post ablation, multiple cardioversions.   Back in normal sinus rhythm, and has quit drinking.  Now afraid to drink, doing well so far.  Remains on amiodarone, on xarelto.    2.  Follow-up gout, no recent flares.  On allopurinol.  3.  Follow-up impaired fasting glucose.   No polydipsia, frequency due to diuretic.  Voids a lot in am.  A1c in October 5.7  4.  Recent renal insufficiency, past few months.  Remains on lasix 40 mg daily for dependent edema.    5.  Follow-up obstructive sleep apnea.   Using CPAP nightly, recently new mask.   Using 6 hours or more, will put back on.   Nocturia x 3  6.  Follow-up HTN.  Patient is checking outpatient blood pressures, at home.  Results are 130/70 range.  He reports no other side effects from BP medications.     MEDICAL HISTORY:     Patient Active Problem List    Diagnosis Date Noted     Chronic a-fib (H) 11/06/2017     Priority: Medium     Aortic valve stenosis 10/18/2017     Priority: Medium     Impaired fasting glucose 02/03/2017     Priority: Medium     Ascending aorta dilatation (H) 12/23/2016     Priority: Medium     Aortic root dilatation       BOWEN (obstructive sleep apnea) 09/30/2016     Priority: Medium     Split Night:  Sleep Study 9/20/2016 - (274.0 lbs) AHI 74.1, RDI 74.1, Supine AHI 79.1, REM AHI 43.6, Low O2% 61.6%, Time Spent ?88% 106.8, Time Spent ?89% 129.4, CPAP was titrated to a pressure of 15 with an AHI 15.6. Time spent in REM supine at this pressure was - minutes.       Advanced directives, counseling/discussion  01/05/2015     Priority: Medium     Advance Care Planning:   ACP Review and Resources Provided:  Reviewed chart for advance care plan.  Mike Schultz has no plan or code status on file. Discussed available resources and provided with information. Confirmed code status reflects current choices pending further ACP discussions.  Confirmed/documented designated decision maker(s). See permanent comments section of demographics in clinical tab.   Added by Shanthi Osei on 1/5/2015       Family history of ischemic heart disease 01/05/2015     Priority: Medium     Father MI 42, brother CABG mid 50's  Patient with perfusion study 7/2012       Gout 01/05/2015     Priority: Medium     Many episodes in right foot.   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  Often related to alcohol intake       Alcoholism (H)      Priority: Medium     Prior heavy abuse.  Went through treatments in past  [facility] 2016  Using 5 - 6 shots of vodka  per day as of 9/16/2016.   Stopped drinking fully 1/1/2017         Atrial fibrillation (HCC)      Priority: Medium     Cardiologist Dr. Yates  DCCV 12/2017, ablation 11/6/17  Echo 12/2014 - LVH, borderline LV size, CHARBEL, 1-2+ MR, borderline prox aorta size       Tobacco use disorder, moderate, in sustained remission      Priority: Medium     dced 6/2004       HYPERLIPIDEMIA LDL GOAL <130 10/31/2010     Priority: Medium     Morbid obesity      Priority: Medium     Herpes simplex virus (HSV) infection      Priority: Medium     Problem list name updated by automated process. Provider to review       Allergic rhinitis 05/12/2005     Priority: Medium     Problem list name updated by automated process. Provider to review       Essential hypertension 04/12/2005     Priority: Medium     Impotence of organic origin 04/12/2005     Priority: Medium      Past Medical History:   Diagnosis Date     Atrial fibrillation (H)     persistent, DCCV 12/2017, ablation 11/6/17     Essential hypertension, benign      H/O ETOH abuse      Herpes  simplex without mention of complication      Hyperlipidemia      Obesity      S/P ablation of atrial fibrillation 11/06/2017     Tobacco use disorder     dced 6/2004     Past Surgical History:   Procedure Laterality Date     C NONSPECIFIC PROCEDURE  1997    achilles tendon     C NONSPECIFIC PROCEDURE      knees, arthroscopy     C NONSPECIFIC PROCEDURE      basal cell skin ca, nose     C NONSPECIFIC PROCEDURE      flex sig; colonoscopy due 3/2008     C NONSPECIFIC PROCEDURE  2012    cardioversion for atr fib     HERNIORRHAPHY UMBILICAL  8/20/2012    Procedure: HERNIORRHAPHY UMBILICAL;  UMBILICAL HERNIA REPAIR;  Surgeon: Ra Crocker MD;  Location: [facility]     ORTHOPEDIC SURGERY       Current Outpatient Prescriptions   Medication Sig Dispense Refill     potassium chloride (K-TAB,KLOR-CON) 10 MEQ tablet Take 2 tablets (20 mEq) by mouth daily       metoprolol tartrate (LOPRESSOR) 50 MG tablet Take 1 tablet (50 mg) by mouth daily       amLODIPine (NORVASC) 5 MG tablet Take 1 tablet (5 mg) by mouth daily       furosemide (LASIX) 20 MG tablet Take 2 tablets (40 mg) by mouth daily 60 tablet 1     amiodarone (PACERONE/CODARONE) 200 MG tablet Take 1 tablet (200 mg) by mouth daily starting 11/22 60 tablet 3     losartan (COZAAR) 100 MG tablet Take 1 tablet by mouth daily 90 tablet 1     rivaroxaban ANTICOAGULANT (XARELTO) 20 MG TABS tablet Take 1 tablet (20 mg) by mouth daily (with dinner) AVIOD USE OF ALCOHOL WHILE USING MEDICATION 90 tablet PRN     acetaminophen (TYLENOL) 325 MG tablet Take 650 mg by mouth       Cyanocobalamin (VITAMIN B 12 PO)        order for DME Equipment ordered: RESMED CPAP Mask type: Full face  Settings: 15       allopurinol (ZYLOPRIM) 300 MG tablet        finasteride (PROPECIA) 1 MG tablet Take 1 mg by mouth daily.                       OTC products: None, except as noted above    Allergies   Allergen Reactions     No Known Drug Allergies       Latex Allergy: NO    Social History   Substance Use  "Topics     Smoking status: Former Smoker     Packs/day: 1.00     Years: 10.00     Types: Cigarettes     Quit date: 8/20/2011     Smokeless tobacco: Never Used     Alcohol use No     History   Drug Use No       REVIEW OF SYSTEMS:   CONSTITUTIONAL: NEGATIVE for fever, chills, change in weight  INTEGUMENTARY/SKIN: NEGATIVE for worrisome rashes, moles or lesions.   Some itching on skin at times.  Dependent edema daily, no recent change.    Stopped wearing support hose.   EYES: NEGATIVE for vision changes or irritation  ENT/MOUTH: NEGATIVE for ear, mouth and throat problems  RESP: NEGATIVE for significant cough or SOB  BREAST: NEGATIVE for masses, tenderness or discharge  CV: NEGATIVE for chest pain, palpitations or peripheral edema  GI: NEGATIVE for nausea, abdominal pain, heartburn, or change in bowel habits  : NEGATIVE for frequency, dysuria, or hematuria  MUSCULOSKELETAL: NEGATIVE for significant arthralgias or myalgia  NEURO: NEGATIVE for weakness, dizziness or paresthesias  ENDOCRINE: NEGATIVE for temperature intolerance, skin/hair changes  HEME: NEGATIVE for bleeding problems  PSYCHIATRIC: NEGATIVE for changes in mood or affect    EXAM:   /78  Pulse (!) 48  Temp 98.5  F (36.9  C) (Oral)  Resp 18  Ht 5' 7\" (1.702 m)  Wt 282 lb 4.8 oz (128.1 kg)  SpO2 94%  BMI 44.21 kg/m2    GENERAL APPEARANCE: healthy, alert and no distress     EYES: Eyes grossly normal to inspection     HENT: ear canals and TM's normal and nose and mouth without ulcers or lesions     NECK: no adenopathy, no asymmetry, masses, or scars and thyroid normal to palpation     RESP: lungs clear to auscultation - no rales, rhonchi or wheezes     BREAST: normal without masses, tenderness or nipple discharge and no palpable axillary masses or adenopathy     CV: regular rates and rhythm, normal S1 S2, no S3 or S4 and no murmur, click or rub     ABDOMEN:  soft, nontender, no HSM or masses and bowel sounds normal     MS: extremities normal- no " gross deformities noted, no evidence of inflammation in joints, FROM in all extremities.     SKIN: no suspicious lesions or rashes, venous stasis changes lower legs, trace edema only     NEURO: Normal strength and tone, sensory exam grossly normal, mentation intact and speech normal     PSYCH: mentation appears normal. and affect normal/bright     LYMPHATICS: No cervical adenopathy    DIAGNOSTICS:   EKG shows sinus bradycardia    Recent Labs   Lab Test  01/11/18   0825  12/21/17   0823   11/06/17   1159   10/12/17   0814  02/03/17   0939  04/28/16 12/23/14   1527   08/16/12   0951   HGB   --    --    --   16.2   --    --   16.2   --   15.7   --   15.2   < >  16.9   PLT   --    --    --   190   --    --    --    --   299   --   217   < >   --    INR   --    --    --   1.66*   --    --    --    --    --    --    --    --   0.95   NA  141  136   --   141   < >  141  144   < >   --    < >  143   < >  141   POTASSIUM  3.8  3.9   < >  3.9   < >  3.7  4.0   < >   --    < >  4.3   < >  4.3   CR  1.32*  1.49*   --   1.00   < >  1.10  1.21   < >  1.1   < >  0.85   < >  1.14   A1C   --    --    --    --    --   5.7   --    --    --    --   5.8   < >   --     < > = values in this interval not displayed.   Repeat creatinine   MRSA screen from [facility] negative     IMPRESSION:   Reason for surgery/procedure:  End stage DJD left knee    Diagnosis/reason for consult:   1.  HTN,   2.  History of atrial fib, currently in sinus rhythm  3.  Follow-up impaired fasting glucose   4.  Obstructive sleep apnea, treated  5.  Aortic valve stenosis  6.  Morbid obesity, contributing to many medical problems  7.  Alcoholism, currently not drinking  8.  Renal insufficiency, likely related to aggressive diuresis, will recheck   9.  Sinus bradycardia, asymptomatic    The proposed surgical procedure is considered INTERMEDIATE risk.    REVISED CARDIAC RISK INDEX  The patient has the following serious cardiovascular risks for perioperative complications  such as (MI, PE, VFib and 3  AV Block):  No serious cardiac risks  INTERPRETATION: 2 risks: Class III (moderate risk - 6.6% complication rate)    The patient has the following additional risks for perioperative complications:  No identified additional risks      ICD-10-CM    1. Preop general physical exam Z01.818        RECOMMENDATIONS:     --Consult hospital rounder to assist post-op medical management    --Patient is to take no scheduled medications on the day of surgery    -- Start wearing knee high support hose once again, daily.    Anticoagulant or Antiplatelet Medication Use  XARELTO: stop medication a full 2 days before surgery        APPROVAL GIVEN to proceed with proposed procedure, without further diagnostic evaluation       Signed Electronically by: Johnny Kay MD    Copy of this evaluation report is provided to requesting physician.    [facility] Preop Guidelines

## 2018-03-02 NOTE — MR AVS SNAPSHOT
After Visit Summary   3/2/2018    Mike Schultz    MRN: [id number]           Patient Information     Date Of Birth          1950        Visit Information        Provider Department      3/2/2018 8:30 AM Johnny Kay MD [facility]        Today's Diagnoses     Preop general physical exam    -  1    Paroxysmal atrial fibrillation (H)        BOWEN (obstructive sleep apnea)        Impaired fasting glucose        Essential hypertension        Gout of foot, unspecified cause, unspecified chronicity, unspecified laterality        Peripheral edema        Osteoarthritis of left knee, unspecified osteoarthritis type        Alcoholism (H)        Need for hepatitis C screening test        Venous insufficiency        Need for prophylactic vaccination against Streptococcus pneumoniae (pneumococcus)          Care Instructions      Before Your Surgery      Call your surgeon if there is any change in your health. This includes signs of a cold or flu (such as a sore throat, runny nose, cough, rash or fever).    Do not smoke, drink alcohol or take over the counter medicine (unless your surgeon or primary care doctor tells you to) for the 24 hours before and after surgery.    If you take prescribed drugs: Follow your doctor s orders about which medicines to take and which to stop until after surgery.    Eating and drinking prior to surgery: follow the instructions from your surgeon    Take a shower or bath the night before surgery. Use the soap your surgeon gave you to gently clean your skin. If you do not have soap from your surgeon, use your regular soap. Do not shave or scrub the surgery site.  Wear clean pajamas and have clean sheets on your bed.       1.  Take no scheduled medications on the day of surgery  2.  Start wearing knee high support hose once again, daily.  3.  XARELTO: stop medication a full 2 days before surgery  4.  Await lab results, to make decision on any medication  "changes          Follow-ups after your visit        Who to contact     If you have questions or need follow up information about today's clinic visit or your schedule please contact [facility] directly at [phone].  Normal or non-critical lab and imaging results will be communicated to you by MyChart, letter or phone within 4 business days after the clinic has received the results. If you do not hear from us within 7 days, please contact the clinic through [employer] or phone. If you have a critical or abnormal lab result, we will notify you by phone as soon as possible.  Submit refill requests through [employer] or call your pharmacy and they will forward the refill request to us. Please allow 3 business days for your refill to be completed.          Additional Information About Your Visit        MyChart Information     [employer] gives you secure access to your electronic health record. If you see a primary care provider, you can also send messages to your care team and make appointments. If you have questions, please call your primary care clinic.  If you do not have a primary care provider, please call [phone] and they will assist you.        Care EveryWhere ID     This is your Care EveryWhere ID. This could be used by other organizations to access your [facility] medical records  [id number]        Your Vitals Were     Pulse Temperature Respirations Height Pulse Oximetry BMI (Body Mass Index)    48 98.5  F (36.9  C) (Oral) 18 5' 7\" (1.702 m) 94% 44.21 kg/m2       Blood Pressure from Last 3 Encounters:   03/02/18 138/78   02/15/18 146/90   01/24/18 132/89    Weight from Last 3 Encounters:   03/02/18 282 lb 4.8 oz (128.1 kg)   02/15/18 286 lb (129.7 kg)   01/24/18 281 lb (127.5 kg)              We Performed the Following     Basic metabolic panel     EKG 12-lead complete w/read - Clinics     Hepatitis C Screen Reflex to HCV RNA Quant and Genotype     MRSA MSSA Presurgical Screen        "   Today's Medication Changes          These changes are accurate as of 3/2/18  9:54 AM.  If you have any questions, ask your nurse or doctor.               These medicines have changed or have updated prescriptions.        Dose/Directions    potassium chloride 10 MEQ tablet   Commonly known as:  K-TAB,KLOR-CON   This may have changed:  when to take this   Used for:  Peripheral edema   Changed by:  Johnny Kay MD        Dose:  20 mEq   Take 2 tablets (20 mEq) by mouth daily   Refills:  0                Primary Care Provider Office Phone # Fax #    Johnny Kay [phone]       [address] [facility] [address]        Equal Access to Services     [facility] : Hadii radha trejo hadasho Sojulio, waaxda luqadaha, qaybta kaalmada adeegyada, kaykay olea. So [facility] [phone].    ATENCIÓN: Si habla español, tiene a hightower disposición servicios gratuitos de asistencia lingüística. Llame al [phone].    We comply with applicable federal civil rights laws and [place] laws. We do not discriminate on the basis of race, color, national origin, age, disability, sex, sexual orientation, or gender identity.            Thank you!     Thank you for choosing [facility]  for your care. Our goal is always to provide you with excellent care. Hearing back from our patients is one way we can continue to improve our services. Please take a few minutes to complete the written survey that you may receive in the mail after your visit with us. Thank you!             Your Updated Medication List - Protect others around you: Learn how to safely use, store and throw away your medicines at www.disposemymeds.org.          This list is accurate as of 3/2/18  9:54 AM.  Always use your most recent med list.                   Brand Name Dispense Instructions for use Diagnosis    acetaminophen 325 MG tablet    TYLENOL     Take 650 mg by mouth        allopurinol 300  MG tablet    ZYLOPRIM      Essential hypertension with goal blood pressure less than 140/90       amiodarone 200 MG tablet    PACERONE/CODARONE    60 tablet    Take 1 tablet (200 mg) by mouth daily starting 11/22    Persistent atrial fibrillation (H)       amLODIPine 5 MG tablet    NORVASC     Take 1 tablet (5 mg) by mouth daily    Essential hypertension       LASIX 20 MG tablet   Generic drug:  furosemide     60 tablet    Take 2 tablets (40 mg) by mouth daily    Peripheral edema       losartan 100 MG tablet    COZAAR    90 tablet    Take 1 tablet by mouth daily    Essential hypertension with goal blood pressure less than 140/90       metoprolol tartrate 50 MG tablet    LOPRESSOR     Take 1 tablet (50 mg) by mouth daily        order for DME      Equipment ordered: RESMED CPAP Mask type: Full face  Settings: 15        potassium chloride 10 MEQ tablet    K-TAB,KLOR-CON     Take 2 tablets (20 mEq) by mouth daily    Peripheral edema       PROPECIA 1 MG tablet   Generic drug:  finasteride      Take 1 mg by mouth daily.        rivaroxaban ANTICOAGULANT 20 MG Tabs tablet    XARELTO    90 tablet    Take 1 tablet (20 mg) by mouth daily (with dinner) AVIOD USE OF ALCOHOL WHILE USING MEDICATION    Persistent atrial fibrillation (H)       VITAMIN B 12 PO

## 2018-03-05 LAB — HCV AB SERPL QL IA: NONREACTIVE

## 2018-03-09 ENCOUNTER — MYC MEDICAL ADVICE (OUTPATIENT)
Dept: INTERNAL MEDICINE | Facility: CLINIC | Age: 68
End: 2018-03-09

## 2018-04-06 ENCOUNTER — OFFICE VISIT (OUTPATIENT)
Dept: INTERNAL MEDICINE | Facility: CLINIC | Age: 68
End: 2018-04-06
Payer: COMMERCIAL

## 2018-04-06 VITALS
BODY MASS INDEX: 43.46 KG/M2 | WEIGHT: 277.5 LBS | SYSTOLIC BLOOD PRESSURE: 104 MMHG | OXYGEN SATURATION: 96 % | RESPIRATION RATE: 18 BRPM | TEMPERATURE: 98 F | HEART RATE: 58 BPM | DIASTOLIC BLOOD PRESSURE: 60 MMHG

## 2018-04-06 DIAGNOSIS — I10 ESSENTIAL HYPERTENSION: ICD-10-CM

## 2018-04-06 DIAGNOSIS — M10.9 GOUT OF FOOT, UNSPECIFIED CAUSE, UNSPECIFIED CHRONICITY, UNSPECIFIED LATERALITY: ICD-10-CM

## 2018-04-06 DIAGNOSIS — R60.0 PERIPHERAL EDEMA: ICD-10-CM

## 2018-04-06 DIAGNOSIS — Z96.652 STATUS POST TOTAL LEFT KNEE REPLACEMENT: Primary | ICD-10-CM

## 2018-04-06 DIAGNOSIS — I48.0 PAROXYSMAL ATRIAL FIBRILLATION (H): ICD-10-CM

## 2018-04-06 PROCEDURE — 99214 OFFICE O/P EST MOD 30 MIN: CPT | Performed by: INTERNAL MEDICINE

## 2018-04-06 RX ORDER — FUROSEMIDE 20 MG
40 TABLET ORAL DAILY
Qty: 180 TABLET | Refills: 3 | Status: SHIPPED | OUTPATIENT
Start: 2018-04-06 | End: 2019-03-04

## 2018-04-06 RX ORDER — AMLODIPINE BESYLATE 5 MG/1
2.5 TABLET ORAL DAILY
Start: 2018-04-06 | End: 2019-01-09

## 2018-04-06 RX ORDER — LOSARTAN POTASSIUM 100 MG/1
100 TABLET ORAL DAILY
Qty: 90 TABLET | Refills: 3 | Status: SHIPPED | OUTPATIENT
Start: 2018-04-06 | End: 2019-05-02

## 2018-04-06 RX ORDER — ALLOPURINOL 300 MG/1
300 TABLET ORAL DAILY
Qty: 90 TABLET | Refills: 3 | Status: SHIPPED | OUTPATIENT
Start: 2018-04-06 | End: 2019-04-26

## 2018-04-06 NOTE — MR AVS SNAPSHOT
After Visit Summary   4/6/2018    Mike Schultz    MRN: [id number]           Patient Information     Date Of Birth          1950        Visit Information        Provider Department      4/6/2018 8:30 AM Johnny Kay MD [facility]        Today's Diagnoses     Status post total left knee replacement    -  1    Paroxysmal atrial fibrillation (H)        Essential hypertension        Peripheral edema        Gout of foot          Care Instructions    PLAN:                                                      1.  MEDICATIONS:        - Decrease amlodipine to 2.5 mg daily       - Continue other medications without change  2.  We will contact Dr. Day regarding ongoing need for anticoagulation and amiodarone  3.  Follow-up in 3.   Recheck kidney function prior to visit.          Follow-ups after your visit        Who to contact     If you have questions or need follow up information about today's clinic visit or your schedule please contact [facility] directly at [phone].  Normal or non-critical lab and imaging results will be communicated to you by Didatuanhart, letter or phone within 4 business days after the clinic has received the results. If you do not hear from us within 7 days, please contact the clinic through Didatuanhart or phone. If you have a critical or abnormal lab result, we will notify you by phone as soon as possible.  Submit refill requests through [employer] or call your pharmacy and they will forward the refill request to us. Please allow 3 business days for your refill to be completed.          Additional Information About Your Visit        MyChart Information     [employer] gives you secure access to your electronic health record. If you see a primary care provider, you can also send messages to your care team and make appointments. If you have questions, please call your primary care clinic.  If you do not have a primary care provider,  please call [phone] and they will assist you.        Care EveryWhere ID     This is your Care EveryWhere ID. This could be used by other organizations to access your [place] medical records  [id number]        Your Vitals Were     Pulse Temperature Respirations Pulse Oximetry BMI (Body Mass Index)       58 98  F (36.7  C) (Oral) 18 96% 43.46 kg/m2        Blood Pressure from Last 3 Encounters:   04/06/18 104/60   03/02/18 138/78   02/15/18 146/90    Weight from Last 3 Encounters:   04/06/18 277 lb 8 oz (125.9 kg)   03/02/18 282 lb 4.8 oz (128.1 kg)   02/15/18 286 lb (129.7 kg)              Today, you had the following     No orders found for display         Today's Medication Changes          These changes are accurate as of 4/6/18  9:27 AM.  If you have any questions, ask your nurse or doctor.               These medicines have changed or have updated prescriptions.        Dose/Directions    allopurinol 300 MG tablet   Commonly known as:  ZYLOPRIM   This may have changed:    - how much to take  - how to take this  - when to take this   Used for:  Gout of foot, unspecified cause, unspecified chronicity, unspecified laterality   Changed by:  Johnny Kay MD        Dose:  300 mg   Take 1 tablet (300 mg) by mouth daily   Quantity:  90 tablet   Refills:  3       amLODIPine 5 MG tablet   Commonly known as:  NORVASC   This may have changed:  how much to take   Used for:  Essential hypertension   Changed by:  Johnny Kay MD        Dose:  2.5 mg   Take 0.5 tablets (2.5 mg) by mouth daily   Refills:  0       losartan 100 MG tablet   Commonly known as:  COZAAR   This may have changed:  See the new instructions.   Used for:  Essential hypertension   Changed by:  Johnny Kay MD        Dose:  100 mg   Take 1 tablet (100 mg) by mouth daily   Quantity:  90 tablet   Refills:  3            Where to get your medicines      These medications were sent to [id number] - [facility] [address]  [address] [facility] [address]     Phone:  [phone]     allopurinol 300 MG tablet    furosemide 20 MG tablet    losartan 100 MG tablet         Some of these will need a paper prescription and others can be bought over the counter.  Ask your nurse if you have questions.     You don't need a prescription for these medications     amLODIPine 5 MG tablet                Primary Care Provider Office Phone # Fax #    Johnny Kay [phone]       [address] [facility] [address]        Equal Access to Services     [facility] : Hadii aad ku hadasho Soomaali, waaxda luqadaha, qaybta kaalmada adeegyada, kaykay choudhury . So [facility] [phone].    ATENCIÓN: Si habla español, tiene a hightower disposición servicios gratuitos de asistencia lingüística. [facility] [phone].    We comply with applicable federal civil rights laws and [place] laws. We do not discriminate on the basis of race, color, national origin, age, disability, sex, sexual orientation, or gender identity.            Thank you!     Thank you for choosing [facility]  for your care. Our goal is always to provide you with excellent care. Hearing back from our patients is one way we can continue to improve our services. Please take a few minutes to complete the written survey that you may receive in the mail after your visit with us. Thank you!             Your Updated Medication List - Protect others around you: Learn how to safely use, store and throw away your medicines at www.disposemymeds.org.          This list is accurate as of 4/6/18  9:27 AM.  Always use your most recent med list.                   Brand Name Dispense Instructions for use Diagnosis    acetaminophen 325 MG tablet    TYLENOL     Take 650 mg by mouth        allopurinol 300 MG tablet    ZYLOPRIM    90 tablet    Take 1 tablet (300 mg) by mouth daily    Gout of foot, unspecified cause,  unspecified chronicity, unspecified laterality       amiodarone 200 MG tablet    PACERONE/CODARONE    60 tablet    Take 1 tablet (200 mg) by mouth daily starting 11/22    Persistent atrial fibrillation (H)       amLODIPine 5 MG tablet    NORVASC     Take 0.5 tablets (2.5 mg) by mouth daily    Essential hypertension       furosemide 20 MG tablet    LASIX    180 tablet    Take 2 tablets (40 mg) by mouth daily    Peripheral edema       losartan 100 MG tablet    COZAAR    90 tablet    Take 1 tablet (100 mg) by mouth daily    Essential hypertension       metoprolol tartrate 50 MG tablet    LOPRESSOR     Take 1 tablet (50 mg) by mouth daily        order for DME      Equipment ordered: RESMED CPAP Mask type: Full face  Settings: 15        potassium chloride 10 MEQ tablet    K-TAB,KLOR-CON     Take 2 tablets (20 mEq) by mouth daily    Peripheral edema       PROPECIA 1 MG tablet   Generic drug:  finasteride      Take 1 mg by mouth daily.        rivaroxaban ANTICOAGULANT 20 MG Tabs tablet    XARELTO    90 tablet    Take 1 tablet (20 mg) by mouth daily (with dinner) AVIOD USE OF ALCOHOL WHILE USING MEDICATION    Persistent atrial fibrillation (H)       VITAMIN B 12 PO

## 2018-04-06 NOTE — PATIENT INSTRUCTIONS
PLAN:                                                      1.  MEDICATIONS:        - Decrease amlodipine to 2.5 mg daily       - Continue other medications without change  2.  We will contact Dr. Day regarding ongoing need for anticoagulation and amiodarone  3.  Follow-up in 3 months.   Recheck kidney function prior to visit.

## 2018-04-06 NOTE — PROGRESS NOTES
SUBJECTIVE:   Mike Schultz is a 67 year old male who presents to clinic today for the following health issues:    Chief Complaint   Patient presents with     Surgical Followup     Patient is status post L total knee arthroplasty at [facility] 3/16/18.   Has been having dependent edema LLE, started wearing support hose with good results.   Pain control is fairly good.  Has been on dilaudid, last dose last night, now out.   Will be using tylenol.  Remains on xarelto.    Was advised to follow-up with Dr. Day about amiodarone.  Had ablation 11/2017, then subsequent cardioversion 12/2017.   To our best knowledge, has been in sinus rhythm since that time.   Remains on xarelto without problems.        1.  HTN,   2.  History of atrial fib, currently in sinus rhythm  3.  Follow-up impaired fasting glucose   4.  Obstructive sleep apnea, treated  5.  Aortic valve stenosis  6.  Morbid obesity, contributing to many medical problems  7.  Alcoholism, currently not drinking  8.  Renal insufficiency, likely related to aggressive diuresis, will recheck   9.  Sinus bradycardia, asymptomatic    Reviewed and updated as needed this visit by clinical staff:  Medications  Allergies  Soc Hx   Additional history: as documented    Additional issues to address:  1.  Follow-up HTN.  Patient was checking outpatient blood pressures, at home before surgery, not since  2.  Trying to lose weight     ROS:  Wearing CPAP nightly, all night  Constitutional, HEENT, cardiovascular, pulmonary, gi and gu systems are negative, except as otherwise noted.  Urinary frequency.      OBJECTIVE:                                                      /60  Pulse 58  Temp 98  F (36.7  C) (Oral)  Resp 18  Wt 277 lb 8 oz (125.9 kg)  SpO2 96%  BMI 43.46 kg/m2  Body mass index is 43.46 kg/(m^2).  No apparent distress  Slow, regular heart tones   L knee incision looks great, mild redness and warmth but appears stable   no active edema ankle, some around  knee.      ASSESSMENT:                                                      1.  Doing well status post left total knee arthroplasty   2.  Dependent edema postop, resolved with support hose  3.  History of atrial fib, appears to have been in sinus rhythm for several months  4.  HTN, blood pressure a bit low  5.  Morbid Obesity, slight improvement    PLAN:                                                      1.  MEDICATIONS:        - Decrease amlodipine to 2.5 mg daily       - Continue other medications without change  2.  We will contact Dr. Day regarding ongoing need for anticoagulation and amiodarone  3.  Follow-up in 3 months.  Recheck kidney function prior to visit.    Johnny Kay MD  [facility]

## 2018-04-19 ENCOUNTER — TELEPHONE (OUTPATIENT)
Dept: CARDIOLOGY | Facility: CLINIC | Age: 68
End: 2018-04-19

## 2018-04-19 NOTE — TELEPHONE ENCOUNTER
----- Message from Dory Kent MD sent at 4/19/2018  8:56 AM CDT -----  Hi, sorry about the tardy reply. I will have our RN contact pt about stopping amiodarone now but continue with Xarelto until he sees me in about 3-4 months. Thanks for the update. dory  ----- Message -----     From: Johnny Kay MD     Sent: 4/6/2018   9:07 AM       To: Dory Kent MD    Good Morning.    Mike bolanos underwent a total knee arthroplasty and is doing well.   We are wondering what the long term plans will be for him regarding anti-arrythmics and anticoagulation.    Here are his details, as I see them:    - Patient had significantly symptomatic atrial fib last year  - Had ablation 11/2017  - Subsequent cardioversion 12/2017.     - To our best knowledge, has been in sinus rhythm since that time.     - He remains on xarelto without problems.        What should I advise him about the amiodarone and the anticoagulation?   Would you like to see him?    Thanks,  Johnny

## 2018-04-19 NOTE — TELEPHONE ENCOUNTER
Patient returned call-I instructed him that Dr. Day would like for him to stop amiodarone and follow up with him in 3 months. I have scheduled him to return to [place] on 6/20 at 8:15. I emphasized that he needs to continue Xarelto. He verbalized understanding and agreement with plan. Camacho

## 2018-05-02 ENCOUNTER — TELEPHONE (OUTPATIENT)
Dept: SLEEP MEDICINE | Facility: CLINIC | Age: 68
End: 2018-05-02

## 2018-05-09 ENCOUNTER — APPOINTMENT (OUTPATIENT)
Dept: SLEEP MEDICINE | Facility: CLINIC | Age: 68
End: 2018-05-09
Payer: COMMERCIAL

## 2018-05-31 DIAGNOSIS — I48.19 PERSISTENT ATRIAL FIBRILLATION (H): ICD-10-CM

## 2018-05-31 RX ORDER — RIVAROXABAN 20 MG/1
TABLET, FILM COATED ORAL
Qty: 30 TABLET | Refills: 11 | Status: SHIPPED | OUTPATIENT
Start: 2018-05-31 | End: 2019-06-03

## 2018-06-20 ENCOUNTER — OFFICE VISIT (OUTPATIENT)
Dept: CARDIOLOGY | Facility: CLINIC | Age: 68
End: 2018-06-20
Payer: COMMERCIAL

## 2018-06-20 VITALS
HEART RATE: 52 BPM | BODY MASS INDEX: 44.29 KG/M2 | WEIGHT: 282.2 LBS | DIASTOLIC BLOOD PRESSURE: 83 MMHG | SYSTOLIC BLOOD PRESSURE: 153 MMHG | HEIGHT: 67 IN

## 2018-06-20 DIAGNOSIS — I48.91 ATRIAL FIBRILLATION, UNSPECIFIED TYPE (H): ICD-10-CM

## 2018-06-20 DIAGNOSIS — I77.810 ASCENDING AORTA DILATATION (H): Primary | ICD-10-CM

## 2018-06-20 PROCEDURE — 93000 ELECTROCARDIOGRAM COMPLETE: CPT | Performed by: INTERNAL MEDICINE

## 2018-06-20 PROCEDURE — 99214 OFFICE O/P EST MOD 30 MIN: CPT | Performed by: INTERNAL MEDICINE

## 2018-06-20 NOTE — MR AVS SNAPSHOT
After Visit Summary   6/20/2018    Mike Schultz    MRN: [id number]           Patient Information     Date Of Birth          1950        Visit Information        Provider Department      6/20/2018 8:15 AM Fady Day MD [facility]        Today's Diagnoses     Ascending aorta dilatation (H)    -  1    Atrial fibrillation, unspecified type (H)           Follow-ups after your visit        Additional Services     Follow-Up with Cardiac Advanced Practice Provider                 Your next 10 appointments already scheduled     Jul 13, 2018  8:15 AM CDT   LAB with [facility]   [facility]    [address] [place] [address]   [phone]           Please do not eat 10-12 hours before your appointment if you are coming in fasting for labs on lipids, cholesterol, or glucose (sugar). This does not apply to pregnant women. Water, hot tea and black coffee (with nothing added) are okay. Do not drink other fluids, diet soda or chew gum.            Jul 13, 2018  8:30 AM CDT   Office Visit with Johnny Kay MD   [facility] ([facility]    [address] [place] [address]   [phone]           Bring a current list of meds and any records pertaining to this visit. For Physicals, please bring immunization records and any forms needing to be filled out. Please arrive 10 minutes early to complete paperwork.              Future tests that were ordered for you today     Open Future Orders        Priority Expected Expires Ordered    Zio Patch Monitor Routine 11/27/2018 6/20/2019 6/20/2018    Follow-Up with Cardiac Advanced Practice Provider Routine 12/17/2018 6/20/2019 6/20/2018            Who to contact     If you have questions or need follow up information about today's clinic visit or your schedule please contact [facility] directly at [phone].  Normal or non-critical lab and imaging results will be communicated to you by MyChart, letter or phone within 4 business days after the clinic has received the results. If you do not hear from us within 7 days, please contact the clinic through MyChart or phone. If you have a critical or abnormal lab result, we will notify you by phone as soon as possible.  Submit refill requests through [email or web address] or call your pharmacy and they will forward the refill request to us. Please allow 3 business days for your refill to be completed.          Additional Information About Your Visit        [employer] Information     [email or web address] gives you secure access to your electronic health record. If you see a primary care provider, you can also send messages to your care team and make appointments. If you have questions, please call your primary care clinic.  If you do not have a primary care provider, please call [phone] and they will assist you.        Care EveryWhere ID     This is your Care EveryWhere ID. This could be used by other organizations to access your [place] medical records  [id number]        Your Vitals Were     Pulse Height BMI (Body Mass Index)             52 1.702 m (5' 7\") 44.2 kg/m2          Blood Pressure from Last 3 Encounters:   06/20/18 153/83   04/06/18 104/60   03/02/18 138/78    Weight from Last 3 Encounters:   06/20/18 128 kg (282 lb 3.2 oz)   04/06/18 125.9 kg (277 lb 8 oz)   03/02/18 128.1 kg (282 lb 4.8 oz)              We Performed the Following     EKG 12-lead complete w/read - Clinics (performed today)        Primary Care Provider Office Phone # Fax #    Johnny Kay [phone]       [address] [place] [address]        Equal Access to Services     [employer] STOK[employer] AH: Van youngo Sojulio, waaxda luqadaha, qaybta kaalmada adeegyabala, kaykay olea. So [facility] " [phone].    ATENCIÓN: Si alpa wilson, tiene a hightower disposición servicios gratuitos de asistencia lingüística. [facility]ty quinn [phone].    We comply with applicable federal civil rights laws and [place] laws. We do not discriminate on the basis of race, color, national origin, age, disability, sex, sexual orientation, or gender identity.            Thank you!     Thank you for choosing [facility]  for your care. Our goal is always to provide you with excellent care. Hearing back from our patients is one way we can continue to improve our services. Please take a few minutes to complete the written survey that you may receive in the mail after your visit with us. Thank you!             Your Updated Medication List - Protect others around you: Learn how to safely use, store and throw away your medicines at www.disposemymeds.org.          This list is accurate as of 6/20/18  8:54 AM.  Always use your most recent med list.                   Brand Name Dispense Instructions for use Diagnosis    acetaminophen 325 MG tablet    TYLENOL     Take 650 mg by mouth        allopurinol 300 MG tablet    ZYLOPRIM    90 tablet    Take 1 tablet (300 mg) by mouth daily    Gout of foot, unspecified cause, unspecified chronicity, unspecified laterality       amLODIPine 5 MG tablet    NORVASC     Take 0.5 tablets (2.5 mg) by mouth daily    Essential hypertension       furosemide 20 MG tablet    LASIX    180 tablet    Take 2 tablets (40 mg) by mouth daily    Peripheral edema       INDOMETHACIN PO      Take 50 mg by mouth daily as needed for moderate pain        losartan 100 MG tablet    COZAAR    90 tablet    Take 1 tablet (100 mg) by mouth daily    Essential hypertension       metoprolol tartrate 50 MG tablet    LOPRESSOR     Take 1 tablet (50 mg) by mouth daily        order for DME      Equipment ordered: RESMED CPAP Mask type: Full face  Settings: 15        potassium chloride 10 MEQ tablet     K-TAB,KLOR-CON     Take 2 tablets (20 mEq) by mouth daily    Peripheral edema       PROPECIA 1 MG tablet   Generic drug:  finasteride      Take 1 mg by mouth daily.        VITAMIN B 12 PO           XARELTO 20 MG Tabs tablet   Generic drug:  rivaroxaban ANTICOAGULANT     30 tablet    take 1 tablet (20mg) by mouth daily with dinner) avoid use of alcohol while using medication.    Persistent atrial fibrillation (H)

## 2018-06-20 NOTE — LETTER
6/20/2018    Johnny Kay MD  [address] [facility] [address]    RE: Mike Schultz       Dear Colleague,    I had the pleasure of seeing Mike Schultz in the [facility].    HPI and Plan:   Dear Dr. Kay:    Thank you for allowing me to participate in care of this very delightful patient.  As you know, Brayden is a 67-year-old gentleman with a history of symptomatic recurrent persistent atrial fibrillation over the past several years and was on flecainide at one point without success.  He was switched to rate control strategy at one-point but I recommended switching back to rhythm control strategy with ablation as the patient experienced symptoms of shortness of breath and fluid retention.    The patient underwent isolation of pulmonary veins and SVC in addition to empiric ablation of CTI this past November.  He did require cardioversion for recurrent of atrial fibrillation within the first few weeks post ablation.  Amiodarone was started after that and since then the patient have not had any shortness of breath no pedal edema which abdomen symptoms of his atrial fibrillation.  I had him stop amiodarone 3 months prior to this visit.    Since stopping amiodarone the patient continues to feel quite well.  He decided to discontinue the Lasix and potassium replacement and have not noticed any pedal edema of significance.  In the past he drank quite heavily but now only a few drinks once a week.  I reminded him that too much alcohol can reduce the success rate of ablation.    The patient wanted to know whether he should continue to take 2.5 mg of amlodipine as you suggested.  His blood pressure was elevated today in my office and at home it is usually in the 130/80.  I asked him to go back to the full tablet of amlodipine for now and as long as he has no symptoms I would just continue it for now.    EKG today demonstrates sinus bradycardia in the 50s and I would continue with  the current dose of metoprolol for now.  I would like to have the patient wear a monitor for 2 weeks prior to returning to see us in about 6 months.  If the monitor shows no atrial fibrillation then at that time we can consider stopping Xarelto.    Orders Placed This Encounter   Procedures     Follow-Up with Cardiac Advanced Practice Provider     EKG 12-lead complete w/read - Clinics (performed today)     Zio Patch Monitor       Orders Placed This Encounter   Medications     INDOMETHACIN PO     Sig: Take 50 mg by mouth daily as needed for moderate pain       There are no discontinued medications.      Encounter Diagnoses   Name Primary?     Ascending aorta dilatation (H) Yes     Atrial fibrillation, unspecified type (H)        CURRENT MEDICATIONS:  Current Outpatient Prescriptions   Medication Sig Dispense Refill     acetaminophen (TYLENOL) 325 MG tablet Take 650 mg by mouth       allopurinol (ZYLOPRIM) 300 MG tablet Take 1 tablet (300 mg) by mouth daily 90 tablet 3     amLODIPine (NORVASC) 5 MG tablet Take 0.5 tablets (2.5 mg) by mouth daily       Cyanocobalamin (VITAMIN B 12 PO)        finasteride (PROPECIA) 1 MG tablet Take 1 mg by mouth daily.       INDOMETHACIN PO Take 50 mg by mouth daily as needed for moderate pain       losartan (COZAAR) 100 MG tablet Take 1 tablet (100 mg) by mouth daily 90 tablet 3     metoprolol tartrate (LOPRESSOR) 50 MG tablet Take 1 tablet (50 mg) by mouth daily       order for DME Equipment ordered: RESMED CPAP Mask type: Full face  Settings: 15       XARELTO 20 MG TABS tablet take 1 tablet (20mg) by mouth daily with dinner) avoid use of alcohol while using medication. 30 tablet 11     furosemide (LASIX) 20 MG tablet Take 2 tablets (40 mg) by mouth daily (Patient not taking: Reported on 6/20/2018) 180 tablet 3     potassium chloride (K-TAB,KLOR-CON) 10 MEQ tablet Take 2 tablets (20 mEq) by mouth daily         ALLERGIES     Allergies   Allergen Reactions     No Known Drug Allergies         PAST MEDICAL HISTORY:  Past Medical History:   Diagnosis Date     Atrial fibrillation (H)     persistent, DCCV 2017, ablation 17     Essential hypertension, benign      H/O ETOH abuse      Herpes simplex without mention of complication      Hyperlipidemia      Obesity      S/P ablation of atrial fibrillation 2017     Tobacco use disorder     dced 2004       PAST SURGICAL HISTORY:  Past Surgical History:   Procedure Laterality Date     C NONSPECIFIC PROCEDURE      achilles tendon     C NONSPECIFIC PROCEDURE      knees, arthroscopy     C NONSPECIFIC PROCEDURE      basal cell skin ca, nose     C NONSPECIFIC PROCEDURE      flex sig; colonoscopy due 3/2008     C NONSPECIFIC PROCEDURE      cardioversion for atr fib     C TOTAL KNEE ARTHROPLASTY Left 2018    Dr. Malick Suarez     HERNIORRHAPHY UMBILICAL  2012    Procedure: HERNIORRHAPHY UMBILICAL;  UMBILICAL HERNIA REPAIR;  Surgeon: Ra Crcoker MD;  Location: [facility]     ORTHOPEDIC SURGERY         FAMILY HISTORY:  Family History   Problem Relation Age of Onset     Family History Negative Mother      HEART DISEASE Father      decesased at 42 - [place]     HEART DISEASE Sister       MI     HEART DISEASE Brother       [place]     HEART DISEASE Brother      CABG AT 49     Lipids Sister      Lipids Sister      Lipids Sister      Family History Negative Brother        SOCIAL HISTORY:  Social History     Social History     Marital status: Single     Spouse name: N/A     Number of children: N/A     Years of education: N/A     Social History Main Topics     Smoking status: Former Smoker     Packs/day: 1.00     Years: 10.00     Types: Cigarettes     Quit date: 2011     Smokeless tobacco: Never Used     Alcohol use No     Drug use: No     Sexual activity: Yes     Partners: Female     Other Topics Concern     None     Social History Narrative       Review of Systems:  Skin:  Negative       Eyes:  Positive for glasses (reading  "only)    ENT:  Negative      Respiratory:  Negative       Cardiovascular:  Negative Positive for    Gastroenterology: Negative      Genitourinary:  Negative      Musculoskeletal:  Negative      Neurologic:  Negative      Psychiatric:  Negative      Heme/Lymph/Imm:  Negative      Endocrine:  Negative        Physical Exam:  Vitals: /83  Pulse 52  Ht 1.702 m (5' 7\")  Wt 128 kg (282 lb 3.2 oz)  BMI 44.2 kg/m2    Constitutional:           Skin:             Head:           Eyes:           Lymph:      ENT:           Neck:           Respiratory:            Cardiac:                                                           GI:           Extremities and Muscular Skeletal:                 Neurological:           Psych:             Thank you for allowing me to participate in the care of your patient.    Sincerely,     Fady Kent MD     [facility] [place] [facility]    "

## 2018-06-20 NOTE — PROGRESS NOTES
HPI and Plan:   Dear Dr. Kay:    Thank you for allowing me to participate in care of this very delightful patient.  As you know, Brayden is a 67-year-old gentleman with a history of symptomatic recurrent persistent atrial fibrillation over the past several years and was on flecainide at one point without success.  He was switched to rate control strategy at one-point but I recommended switching back to rhythm control strategy with ablation as the patient experienced symptoms of shortness of breath and fluid retention.    The patient underwent isolation of pulmonary veins and SVC in addition to empiric ablation of CTI this past November.  He did require cardioversion for recurrent of atrial fibrillation within the first few weeks post ablation.  Amiodarone was started after that and since then the patient have not had any shortness of breath no pedal edema which abdomen symptoms of his atrial fibrillation.  I had him stop amiodarone 3 months prior to this visit.    Since stopping amiodarone the patient continues to feel quite well.  He decided to discontinue the Lasix and potassium replacement and have not noticed any pedal edema of significance.  In the past he drank quite heavily but now only a few drinks once a week.  I reminded him that too much alcohol can reduce the success rate of ablation.    The patient wanted to know whether he should continue to take 2.5 mg of amlodipine as you suggested.  His blood pressure was elevated today in my office and at home it is usually in the 130/80.  I asked him to go back to the full tablet of amlodipine for now and as long as he has no symptoms I would just continue it for now.    EKG today demonstrates sinus bradycardia in the 50s and I would continue with the current dose of metoprolol for now.  I would like to have the patient wear a monitor for 2 weeks prior to returning to see us in about 6 months.  If the monitor shows no atrial fibrillation then at that time we can  consider stopping Xarelto.    Orders Placed This Encounter   Procedures     Follow-Up with Cardiac Advanced Practice Provider     EKG 12-lead complete w/read - Clinics (performed today)     Zio Patch Monitor       Orders Placed This Encounter   Medications     INDOMETHACIN PO     Sig: Take 50 mg by mouth daily as needed for moderate pain       There are no discontinued medications.      Encounter Diagnoses   Name Primary?     Ascending aorta dilatation (H) Yes     Atrial fibrillation, unspecified type (H)        CURRENT MEDICATIONS:  Current Outpatient Prescriptions   Medication Sig Dispense Refill     acetaminophen (TYLENOL) 325 MG tablet Take 650 mg by mouth       allopurinol (ZYLOPRIM) 300 MG tablet Take 1 tablet (300 mg) by mouth daily 90 tablet 3     amLODIPine (NORVASC) 5 MG tablet Take 0.5 tablets (2.5 mg) by mouth daily       Cyanocobalamin (VITAMIN B 12 PO)        finasteride (PROPECIA) 1 MG tablet Take 1 mg by mouth daily.       INDOMETHACIN PO Take 50 mg by mouth daily as needed for moderate pain       losartan (COZAAR) 100 MG tablet Take 1 tablet (100 mg) by mouth daily 90 tablet 3     metoprolol tartrate (LOPRESSOR) 50 MG tablet Take 1 tablet (50 mg) by mouth daily       order for DME Equipment ordered: RESMED CPAP Mask type: Full face  Settings: 15       XARELTO 20 MG TABS tablet take 1 tablet (20mg) by mouth daily with dinner) avoid use of alcohol while using medication. 30 tablet 11     furosemide (LASIX) 20 MG tablet Take 2 tablets (40 mg) by mouth daily (Patient not taking: Reported on 6/20/2018) 180 tablet 3     potassium chloride (K-TAB,KLOR-CON) 10 MEQ tablet Take 2 tablets (20 mEq) by mouth daily         ALLERGIES     Allergies   Allergen Reactions     No Known Drug Allergies        PAST MEDICAL HISTORY:  Past Medical History:   Diagnosis Date     Atrial fibrillation (H)     persistent, DCCV 12/2017, ablation 11/6/17     Essential hypertension, benign      H/O ETOH abuse      Herpes simplex  without mention of complication      Hyperlipidemia      Obesity      S/P ablation of atrial fibrillation 2017     Tobacco use disorder     dced 2004       PAST SURGICAL HISTORY:  Past Surgical History:   Procedure Laterality Date     C NONSPECIFIC PROCEDURE      achilles tendon     C NONSPECIFIC PROCEDURE      knees, arthroscopy     C NONSPECIFIC PROCEDURE      basal cell skin ca, nose     C NONSPECIFIC PROCEDURE      flex sig; colonoscopy due 3/2008     C NONSPECIFIC PROCEDURE      cardioversion for atr fib     C TOTAL KNEE ARTHROPLASTY Left 2018    Dr. Malick Suarez     HERNIORRHAPHY UMBILICAL  2012    Procedure: HERNIORRHAPHY UMBILICAL;  UMBILICAL HERNIA REPAIR;  Surgeon: Ra Crocker MD;  Location: [facility]     ORTHOPEDIC SURGERY         FAMILY HISTORY:  Family History   Problem Relation Age of Onset     Family History Negative Mother      HEART DISEASE Father      decesased at 42 - [place]     HEART DISEASE Sister       MI     HEART DISEASE Brother       MI     HEART DISEASE Brother      CABG AT 49     Lipids Sister      Lipids Sister      Lipids Sister      Family History Negative Brother        SOCIAL HISTORY:  Social History     Social History     Marital status: Single     Spouse name: N/A     Number of children: N/A     Years of education: N/A     Social History Main Topics     Smoking status: Former Smoker     Packs/day: 1.00     Years: 10.00     Types: Cigarettes     Quit date: 2011     Smokeless tobacco: Never Used     Alcohol use No     Drug use: No     Sexual activity: Yes     Partners: Female     Other Topics Concern     None     Social History Narrative       Review of Systems:  Skin:  Negative       Eyes:  Positive for glasses (reading only)    ENT:  Negative      Respiratory:  Negative       Cardiovascular:  Negative Positive for    Gastroenterology: Negative      Genitourinary:  Negative      Musculoskeletal:  Negative      Neurologic:  Negative     "  Psychiatric:  Negative      Heme/Lymph/Imm:  Negative      Endocrine:  Negative        Physical Exam:  Vitals: /83  Pulse 52  Ht 1.702 m (5' 7\")  Wt 128 kg (282 lb 3.2 oz)  BMI 44.2 kg/m2    Constitutional:           Skin:             Head:           Eyes:           Lymph:      ENT:           Neck:           Respiratory:            Cardiac:                                                           GI:           Extremities and Muscular Skeletal:                 Neurological:           Psych:           CC  No referring provider defined for this encounter.              "

## 2018-06-20 NOTE — LETTER
6/20/2018    Johnny Kay MD  [address] [facility][1] [phone]    RE: Mike Schultz       Dear Colleague,    I had the pleasure of seeing Mike Schultz in the [facility].    HPI and Plan:   Dear Dr. Kay:    Thank you for allowing me to participate in care of this very delightful patient.  As you know, Brayden is a 67-year-old gentleman with a history of symptomatic recurrent persistent atrial fibrillation over the past several years and was on flecainide at one point without success.  He was switched to rate control strategy at one-point but I recommended switching back to rhythm control strategy with ablation as the patient experienced symptoms of shortness of breath and fluid retention.    The patient underwent isolation of pulmonary veins and SVC in addition to empiric ablation of CTI this past November.  He did require cardioversion for recurrent of atrial fibrillation within the first few weeks post ablation.  Amiodarone was started after that and since then the patient have not had any shortness of breath no pedal edema which abdomen symptoms of his atrial fibrillation.  I had him stop amiodarone 3 months prior to this visit.    Since stopping amiodarone the patient continues to feel quite well.  He decided to discontinue the Lasix and potassium replacement and have not noticed any pedal edema of significance.  In the past he drank quite heavily but now only a few drinks once a week.  I reminded him that too much alcohol can reduce the success rate of ablation.    The patient wanted to know whether he should continue to take 2.5 mg of amlodipine as you suggested.  His blood pressure was elevated today in my office and at home it is usually in the 130/80.  I asked him to go back to the full tablet of amlodipine for now and as long as he has no symptoms I would just continue it for now.    EKG today demonstrates sinus bradycardia in the 50s and I would continue with  the current dose of metoprolol for now.  I would like to have the patient wear a monitor for 2 weeks prior to returning to see us in about 6 months.  If the monitor shows no atrial fibrillation then at that time we can consider stopping Xarelto.    Orders Placed This Encounter   Procedures     Follow-Up with Cardiac Advanced Practice Provider     EKG 12-lead complete w/read - Clinics (performed today)     Zio Patch Monitor       Orders Placed This Encounter   Medications     INDOMETHACIN PO     Sig: Take 50 mg by mouth daily as needed for moderate pain       There are no discontinued medications.      Encounter Diagnoses   Name Primary?     Ascending aorta dilatation (H) Yes     Atrial fibrillation, unspecified type (H)        CURRENT MEDICATIONS:  Current Outpatient Prescriptions   Medication Sig Dispense Refill     acetaminophen (TYLENOL) 325 MG tablet Take 650 mg by mouth       allopurinol (ZYLOPRIM) 300 MG tablet Take 1 tablet (300 mg) by mouth daily 90 tablet 3     amLODIPine (NORVASC) 5 MG tablet Take 0.5 tablets (2.5 mg) by mouth daily       Cyanocobalamin (VITAMIN B 12 PO)        finasteride (PROPECIA) 1 MG tablet Take 1 mg by mouth daily.       INDOMETHACIN PO Take 50 mg by mouth daily as needed for moderate pain       losartan (COZAAR) 100 MG tablet Take 1 tablet (100 mg) by mouth daily 90 tablet 3     metoprolol tartrate (LOPRESSOR) 50 MG tablet Take 1 tablet (50 mg) by mouth daily       order for DME Equipment ordered: RESMED CPAP Mask type: Full face  Settings: 15       XARELTO 20 MG TABS tablet take 1 tablet (20mg) by mouth daily with dinner) avoid use of alcohol while using medication. 30 tablet 11     furosemide (LASIX) 20 MG tablet Take 2 tablets (40 mg) by mouth daily (Patient not taking: Reported on 6/20/2018) 180 tablet 3     potassium chloride (K-TAB,KLOR-CON) 10 MEQ tablet Take 2 tablets (20 mEq) by mouth daily         ALLERGIES     Allergies   Allergen Reactions     No Known Drug Allergies         PAST MEDICAL HISTORY:  Past Medical History:   Diagnosis Date     Atrial fibrillation (H)     persistent, DCCV 2017, ablation 17     Essential hypertension, benign      H/O ETOH abuse      Herpes simplex without mention of complication      Hyperlipidemia      Obesity      S/P ablation of atrial fibrillation 2017     Tobacco use disorder     dced 2004       PAST SURGICAL HISTORY:  Past Surgical History:   Procedure Laterality Date     C NONSPECIFIC PROCEDURE      achilles tendon     C NONSPECIFIC PROCEDURE      knees, arthroscopy     C NONSPECIFIC PROCEDURE      basal cell skin ca, nose     C NONSPECIFIC PROCEDURE      flex sig; colonoscopy due 3/2008     C NONSPECIFIC PROCEDURE      cardioversion for atr fib     C TOTAL KNEE ARTHROPLASTY Left 2018    Dr. Malick Suarez     HERNIORRHAPHY UMBILICAL  2012    Procedure: HERNIORRHAPHY UMBILICAL;  UMBILICAL HERNIA REPAIR;  Surgeon: Ra Crocker MD;  Location: [facility]     ORTHOPEDIC SURGERY         FAMILY HISTORY:  Family History   Problem Relation Age of Onset     Family History Negative Mother      HEART DISEASE Father      decesased at 42 - [place]     HEART DISEASE Sister       MI     HEART DISEASE Brother       MI     HEART DISEASE Brother      CABG AT 49     Lipids Sister      Lipids Sister      Lipids Sister      Family History Negative Brother        SOCIAL HISTORY:  Social History     Social History     Marital status: Single     Spouse name: N/A     Number of children: N/A     Years of education: N/A     Social History Main Topics     Smoking status: Former Smoker     Packs/day: 1.00     Years: 10.00     Types: Cigarettes     Quit date: 2011     Smokeless tobacco: Never Used     Alcohol use No     Drug use: No     Sexual activity: Yes     Partners: Female     Other Topics Concern     None     Social History Narrative       Review of Systems:  Skin:  Negative       Eyes:  Positive for glasses (reading  "only)    ENT:  Negative      Respiratory:  Negative       Cardiovascular:  Negative Positive for    Gastroenterology: Negative      Genitourinary:  Negative      Musculoskeletal:  Negative      Neurologic:  Negative      Psychiatric:  Negative      Heme/Lymph/Imm:  Negative      Endocrine:  Negative        Physical Exam:  Vitals: /83  Pulse 52  Ht 1.702 m (5' 7\")  Wt 128 kg (282 lb 3.2 oz)  BMI 44.2 kg/m2    Constitutional:           Skin:             Head:           Eyes:           Lymph:      ENT:           Neck:           Respiratory:            Cardiac:                                                           GI:           Extremities and Muscular Skeletal:                 Neurological:           Psych:           CC  No referring provider defined for this encounter.                Thank you for allowing me to participate in the care of your patient.      Sincerely,     Fady Kent MD     [facility]    cc:   No referring provider defined for this encounter.        "

## 2018-08-29 DIAGNOSIS — R60.0 PERIPHERAL EDEMA: ICD-10-CM

## 2018-08-30 RX ORDER — POTASSIUM CHLORIDE 750 MG/1
TABLET, EXTENDED RELEASE ORAL
Qty: 180 TABLET | Refills: 1 | Status: SHIPPED | OUTPATIENT
Start: 2018-08-30 | End: 2019-03-04

## 2018-08-30 NOTE — TELEPHONE ENCOUNTER
"Requested Prescriptions   Pending Prescriptions Disp Refills     KLOR-CON 10 MEQ CR tablet [Pharmacy Med Name: Klor-Con M10 Oral Tablet Extended Release 10 MEQ] 180 tablet PRN      Last Written Prescription Date:  03/02/18  Last Fill Quantity: ,  # refills:    Last office visit: 4/6/2018 with prescribing provider:  04/06/18   Future Office Visit:  0 Sig: TAKE TWO TABLETS BY MOUTH DAILY    Potassium Supplements Protocol Passed    8/29/2018  8:21 PM       Passed - Recent (12 mo) or future (30 days) visit within the authorizing provider's specialty    Patient had office visit in the last 12 months or has a visit in the next 30 days with authorizing provider or within the authorizing provider's specialty.  See \"Patient Info\" tab in inbasket, or \"Choose Columns\" in Meds & Orders section of the refill encounter.           Passed - Patient is age 18 or older       Passed - Normal serum potassium in past 12 months    Recent Labs   Lab Test  03/02/18   1042   POTASSIUM  4.3                    "

## 2018-09-04 ENCOUNTER — TELEPHONE (OUTPATIENT)
Dept: CARDIOLOGY | Facility: CLINIC | Age: 68
End: 2018-09-04

## 2018-09-04 NOTE — TELEPHONE ENCOUNTER
Was asked to call patient in response to vm left on scheduling line over the weekend. Patient states that he has been experiencing some palpitations over the past week and a couple of episodes where at rest he get diaphoretic which is for him a precursor for his afib. He states that he is feeling fine today. He was on vacation two weeks ago and did 'a lot of drinking over 4 days' and he knows that this could be the trigger. I asked him to check his pulse for 30 seconds to see if it is regular. He did so and it ws regular. I told him that we don't need to do anything for now. He says that he will quit alcohol knowing full well that this is his trigger. I also advised him to maintain hydration. I told him that if he gets an episode to call us and we may have him come in for an EKG. Camacho

## 2018-09-11 ENCOUNTER — TELEPHONE (OUTPATIENT)
Dept: CARDIOLOGY | Facility: CLINIC | Age: 68
End: 2018-09-11

## 2018-09-11 NOTE — TELEPHONE ENCOUNTER
Patient called requesting price of ZIO patch monitor and lexiscan nuclear stress test. Patient also scheduled for an OTTO OV and a Dr. Hammond OV this fall. Patient will contact [place], at [phone] with questions.   Patient states he may decide to cancel appointment until after the new year.

## 2018-10-01 NOTE — TELEPHONE ENCOUNTER
10/1/18  3:29 pm- LMB Reason for call: Medication   If this is a refill request, has the caller requested the refill from the pharmacy already? No  Will the patient be using a [facility]? No  Name of the pharmacy and phone number for the current request: [facility] pharmacy on [address] [place]    Name of the medication requested: Prednisone 20 mg he got from a outside physican, patient is out of this med got this for ghout    Other request: Please call patient when approved    Phone Number Pt can be reached at: Home number on file [phone] (home)  Best Time: anytime  Can we leave a detailed message on this number? YES

## 2018-12-07 DIAGNOSIS — E78.5 HYPERLIPIDEMIA LDL GOAL <130: ICD-10-CM

## 2018-12-07 DIAGNOSIS — R73.01 IMPAIRED FASTING GLUCOSE: ICD-10-CM

## 2018-12-07 DIAGNOSIS — M10.9 GOUT OF FOOT, UNSPECIFIED CAUSE, UNSPECIFIED CHRONICITY, UNSPECIFIED LATERALITY: Primary | ICD-10-CM

## 2018-12-07 DIAGNOSIS — I10 ESSENTIAL HYPERTENSION: ICD-10-CM

## 2018-12-07 DIAGNOSIS — I48.91 ATRIAL FIBRILLATION, UNSPECIFIED TYPE (H): ICD-10-CM

## 2018-12-07 NOTE — TELEPHONE ENCOUNTER
"Requested Prescriptions   Pending Prescriptions Disp Refills     metoprolol tartrate (LOPRESSOR) 50 MG tablet [Pharmacy Med Name: Metoprolol Tartrate Oral Tablet 50 MG]  HISTORICAL.  Last Written Prescription Date:  3/2/18  Last Fill Quantity: NA,  # refills: NA   Last office visit: 4/6/2018 with prescribing provider:  VASILE   Future Office Visit:     60 tablet 3     Sig: Take 2 tablets (100 mg) by mouth 2 times daily    Beta-Blockers Protocol Failed    12/7/2018  7:34 AM       Failed - Blood pressure under 140/90 in past 12 months    BP Readings from Last 3 Encounters:   06/20/18 153/83   04/06/18 104/60   03/02/18 138/78                Passed - Patient is age 6 or older       Passed - Recent (12 mo) or future (30 days) visit within the authorizing provider's specialty    Patient had office visit in the last 12 months or has a visit in the next 30 days with authorizing provider or within the authorizing provider's specialty.  See \"Patient Info\" tab in inbasket, or \"Choose Columns\" in Meds & Orders section of the refill encounter.              "

## 2018-12-10 RX ORDER — METOPROLOL TARTRATE 50 MG
50 TABLET ORAL DAILY
Status: CANCELLED | OUTPATIENT
Start: 2018-12-10

## 2018-12-10 NOTE — TELEPHONE ENCOUNTER
Routing refill request to provider for review/approval because:  Medication is reported/historical

## 2018-12-10 NOTE — TELEPHONE ENCOUNTER
Requested Prescriptions   Pending Prescriptions Disp Refills     metoprolol tartrate (LOPRESSOR) 50 MG tablet       Sig: Take 1 tablet (50 mg) by mouth daily    There is no refill protocol information for this order        metoprolol tartrate (LOPRESSOR) 50 MG tablet      Last Written Prescription Date:  3/2/18  Last Fill Quantity: ,   # refills:   Last Office Visit: 4/6/18  Future Office visit:       Routing refill request to provider for review/approval because:  Medication is reported/historical

## 2018-12-11 NOTE — TELEPHONE ENCOUNTER
I'm confused by the dosage requested here, I believe by the system.  Please contact patient and make sure that he's still taking 50 mg once daily.   Request is for 2 bid, for some reason.

## 2018-12-31 RX ORDER — METOPROLOL TARTRATE 50 MG
100 TABLET ORAL 2 TIMES DAILY
Qty: 120 TABLET | Refills: 3 | OUTPATIENT
Start: 2018-12-31

## 2018-12-31 NOTE — TELEPHONE ENCOUNTER
Patient states he takes metoprolol tartrate (lopressor) 50 mg tab one tab in AM. He noticed when looking at his bottle, that it is actually written to take 2 tabs BID but he has always only taken 1 tab in AM, says maybe he was not following directions. Also notes he will see heart MD on Wednesday and then will call for follow up with PCP per his request.     Please advise if ok to wait to be seen by PCP until next opening. Looks like overdue for f/u and labs per last office visit note.    Ok to refill metoprolol at 50 mg daily? Or should he take differently?

## 2019-01-09 ENCOUNTER — OFFICE VISIT (OUTPATIENT)
Dept: CARDIOLOGY | Facility: CLINIC | Age: 69
End: 2019-01-09
Attending: INTERNAL MEDICINE
Payer: COMMERCIAL

## 2019-01-09 VITALS
HEIGHT: 67 IN | SYSTOLIC BLOOD PRESSURE: 168 MMHG | HEART RATE: 83 BPM | WEIGHT: 305 LBS | BODY MASS INDEX: 47.87 KG/M2 | DIASTOLIC BLOOD PRESSURE: 99 MMHG

## 2019-01-09 DIAGNOSIS — I48.19 PERSISTENT ATRIAL FIBRILLATION (H): Primary | ICD-10-CM

## 2019-01-09 DIAGNOSIS — I10 BENIGN ESSENTIAL HYPERTENSION: ICD-10-CM

## 2019-01-09 PROCEDURE — 93000 ELECTROCARDIOGRAM COMPLETE: CPT | Performed by: INTERNAL MEDICINE

## 2019-01-09 PROCEDURE — 99215 OFFICE O/P EST HI 40 MIN: CPT | Performed by: INTERNAL MEDICINE

## 2019-01-09 RX ORDER — DILTIAZEM HYDROCHLORIDE 240 MG/1
240 CAPSULE, COATED, EXTENDED RELEASE ORAL DAILY
Qty: 90 CAPSULE | Refills: 3 | Status: SHIPPED | OUTPATIENT
Start: 2019-01-09 | End: 2019-01-10 | Stop reason: ALTCHOICE

## 2019-01-09 ASSESSMENT — MIFFLIN-ST. JEOR: SCORE: 2117.22

## 2019-01-09 NOTE — PROGRESS NOTES
HPI and Plan:   See dictation    Orders Placed This Encounter   Procedures     Follow-Up with Cardiac Advanced Practice Provider Post-Procedure     Follow-Up with Electrophysiologist     EKG 12-lead complete w/read - Clinics (performed today)     Zio Patch Holter Adult Pediatric Greater than 48 hrs       Orders Placed This Encounter   Medications     diltiazem ER COATED BEADS (CARDIZEM CD) 240 MG 24 hr capsule     Sig: Take 1 capsule (240 mg) by mouth daily     Dispense:  90 capsule     Refill:  3       Medications Discontinued During This Encounter   Medication Reason     amLODIPine (NORVASC) 5 MG tablet          Encounter Diagnoses   Name Primary?     Benign essential hypertension      Persistent atrial fibrillation (H) Yes       CURRENT MEDICATIONS:  Current Outpatient Medications   Medication Sig Dispense Refill     allopurinol (ZYLOPRIM) 300 MG tablet Take 1 tablet (300 mg) by mouth daily 90 tablet 3     diltiazem ER COATED BEADS (CARDIZEM CD) 240 MG 24 hr capsule Take 1 capsule (240 mg) by mouth daily 90 capsule 3     finasteride (PROPECIA) 1 MG tablet Take 1 mg by mouth daily.       furosemide (LASIX) 20 MG tablet Take 2 tablets (40 mg) by mouth daily 180 tablet 3     losartan (COZAAR) 100 MG tablet Take 1 tablet (100 mg) by mouth daily 90 tablet 3     metoprolol tartrate (LOPRESSOR) 50 MG tablet Take 1 tablet (50 mg) by mouth daily       potassium chloride (K-TAB,KLOR-CON) 10 MEQ tablet Take 2 tablets (20 mEq) by mouth daily       XARELTO 20 MG TABS tablet take 1 tablet (20mg) by mouth daily with dinner) avoid use of alcohol while using medication. 30 tablet 11     acetaminophen (TYLENOL) 325 MG tablet Take 650 mg by mouth       Cyanocobalamin (VITAMIN B 12 PO)        INDOMETHACIN PO Take 50 mg by mouth daily as needed for moderate pain       KLOR-CON 10 MEQ CR tablet TAKE TWO TABLETS BY MOUTH DAILY  180 tablet 1     order for DME Equipment ordered: RESMED CPAP Mask type: Full face  Settings: 15          ALLERGIES     Allergies   Allergen Reactions     No Known Drug Allergies        PAST MEDICAL HISTORY:  Past Medical History:   Diagnosis Date     Atrial fibrillation (H)     persistent, DCCV 2017, ablation 17     Essential hypertension, benign      H/O ETOH abuse      Herpes simplex without mention of complication      Hyperlipidemia      Obesity      S/P ablation of atrial fibrillation 2017     Tobacco use disorder     dced 2004       PAST SURGICAL HISTORY:  Past Surgical History:   Procedure Laterality Date     C NONSPECIFIC PROCEDURE      achilles tendon     C NONSPECIFIC PROCEDURE      knees, arthroscopy     C NONSPECIFIC PROCEDURE      basal cell skin ca, nose     C NONSPECIFIC PROCEDURE      flex sig; colonoscopy due 3/2008     C NONSPECIFIC PROCEDURE      cardioversion for atr fib     C TOTAL KNEE ARTHROPLASTY Left 2018    Dr. Malick Suarez     HERNIORRHAPHY UMBILICAL  2012    Procedure: HERNIORRHAPHY UMBILICAL;  UMBILICAL HERNIA REPAIR;  Surgeon: Ra Crocker MD;  Location: [facility]     ORTHOPEDIC SURGERY         FAMILY HISTORY:  Family History   Problem Relation Age of Onset     Family History Negative Mother      Heart Disease Father         decesased at 42 - MI     Heart Disease Sister          MI     Heart Disease Brother          MI     Heart Disease Brother         CABG AT 49     Lipids Sister      Lipids Sister      Lipids Sister      Family History Negative Brother        SOCIAL HISTORY:  Social History     Socioeconomic History     Marital status: Single     Spouse name: None     Number of children: None     Years of education: None     Highest education level: None   Social Needs     Financial resource strain: None     Food insecurity - worry: None     Food insecurity - inability: None     Transportation needs - medical: None     Transportation needs - non-medical: None   Occupational History     None   Tobacco Use     Smoking status:  "Former Smoker     Packs/day: 1.00     Years: 10.00     Pack years: 10.00     Types: Cigarettes     Last attempt to quit: 2011     Years since quittin.3     Smokeless tobacco: Never Used   Substance and Sexual Activity     Alcohol use: No     Drug use: No     Sexual activity: Yes     Partners: Female   Other Topics Concern     Parent/sibling w/ CABG, MI or angioplasty before 65F 55M? Not Asked   Social History Narrative     None       Review of Systems:  Skin:  Negative       Eyes:  Positive for glasses(reading only)    ENT:  Negative      Respiratory:  Positive for dyspnea on exertion;shortness of breath     Cardiovascular:  Negative Positive for;edema    Gastroenterology: Negative      Genitourinary:  Negative      Musculoskeletal:  Negative      Neurologic:  Negative      Psychiatric:  Negative      Heme/Lymph/Imm:  Negative      Endocrine:  Negative        Physical Exam:  Vitals: BP (!) 168/99   Pulse 83   Ht 1.702 m (5' 7.01\")   Wt 138.3 kg (305 lb)   BMI 47.76 kg/m      Constitutional:  cooperative;well nourished;in no acute distress obese increased BMI    Skin:  warm and dry to the touch, no apparent skin lesions or masses noted          Head:  normocephalic        Eyes:  pupils equal and round, conjunctivae and lids unremarkable, sclera white, no xanthalasma, EOMS intact, no nystagmus        Lymph:No Cervical lymphadenopathy present     ENT:  no pallor or cyanosis, dentition good        Neck:  carotid pulses are full and equal bilaterally, JVP normal, no carotid bruit        Respiratory:  normal breath sounds, clear to auscultation, normal A-P diameter, normal symmetry, normal respiratory excursion, no use of accessory muscles         Cardiac: regular rhythm, normal S1/S2, no S3 or S4, apical impulse not displaced, no murmurs, gallops or rubs irregularly irregular rhythm   no presence of murmur          pulses full and equal, no bruits auscultated                                        GI:  " abdomen soft, non-tender, BS normoactive, no mass, no HSM, no bruits obese      Extremities and Muscular Skeletal:  no deformities, clubbing, cyanosis, erythema observed   bilateral LE edema;trace          Neurological:  affect appropriate   mild limp    Psych:  Alert and Oriented x 3;affect appropriate, oriented to time, person and place        CC  Mary Yates MD  [address]  [address]  [place] [address]

## 2019-01-09 NOTE — PROGRESS NOTES
HPI and Plan:   See dictation    Orders Placed This Encounter   Procedures     Follow-Up with Cardiac Advanced Practice Provider Post-Procedure     Follow-Up with Electrophysiologist     EKG 12-lead complete w/read - Clinics (performed today)     Zio Patch Holter Adult Pediatric Greater than 48 hrs       Orders Placed This Encounter   Medications     diltiazem ER COATED BEADS (CARDIZEM CD) 240 MG 24 hr capsule     Sig: Take 1 capsule (240 mg) by mouth daily     Dispense:  90 capsule     Refill:  3       Medications Discontinued During This Encounter   Medication Reason     amLODIPine (NORVASC) 5 MG tablet          Encounter Diagnoses   Name Primary?     Benign essential hypertension      Persistent atrial fibrillation (H) Yes       CURRENT MEDICATIONS:  Current Outpatient Medications   Medication Sig Dispense Refill     allopurinol (ZYLOPRIM) 300 MG tablet Take 1 tablet (300 mg) by mouth daily 90 tablet 3     diltiazem ER COATED BEADS (CARDIZEM CD) 240 MG 24 hr capsule Take 1 capsule (240 mg) by mouth daily 90 capsule 3     finasteride (PROPECIA) 1 MG tablet Take 1 mg by mouth daily.       furosemide (LASIX) 20 MG tablet Take 2 tablets (40 mg) by mouth daily 180 tablet 3     losartan (COZAAR) 100 MG tablet Take 1 tablet (100 mg) by mouth daily 90 tablet 3     metoprolol tartrate (LOPRESSOR) 50 MG tablet Take 1 tablet (50 mg) by mouth daily       potassium chloride (K-TAB,KLOR-CON) 10 MEQ tablet Take 2 tablets (20 mEq) by mouth daily       XARELTO 20 MG TABS tablet take 1 tablet (20mg) by mouth daily with dinner) avoid use of alcohol while using medication. 30 tablet 11     acetaminophen (TYLENOL) 325 MG tablet Take 650 mg by mouth       Cyanocobalamin (VITAMIN B 12 PO)        INDOMETHACIN PO Take 50 mg by mouth daily as needed for moderate pain       KLOR-CON 10 MEQ CR tablet TAKE TWO TABLETS BY MOUTH DAILY  180 tablet 1     order for DME Equipment ordered: RESMED CPAP Mask type: Full face  Settings: 15          ALLERGIES     Allergies   Allergen Reactions     No Known Drug Allergies        PAST MEDICAL HISTORY:  Past Medical History:   Diagnosis Date     Atrial fibrillation (H)     persistent, DCCV 2017, ablation 17     Essential hypertension, benign      H/O ETOH abuse      Herpes simplex without mention of complication      Hyperlipidemia      Obesity      S/P ablation of atrial fibrillation 2017     Tobacco use disorder     dced 2004       PAST SURGICAL HISTORY:  Past Surgical History:   Procedure Laterality Date     C NONSPECIFIC PROCEDURE      achilles tendon     C NONSPECIFIC PROCEDURE      knees, arthroscopy     C NONSPECIFIC PROCEDURE      basal cell skin ca, nose     C NONSPECIFIC PROCEDURE      flex sig; colonoscopy due 3/2008     C NONSPECIFIC PROCEDURE      cardioversion for atr fib     C TOTAL KNEE ARTHROPLASTY Left 2018    Dr. Malick Suarez     HERNIORRHAPHY UMBILICAL  2012    Procedure: HERNIORRHAPHY UMBILICAL;  UMBILICAL HERNIA REPAIR;  Surgeon: Ra Crocker MD;  Location: [facility]     ORTHOPEDIC SURGERY         FAMILY HISTORY:  Family History   Problem Relation Age of Onset     Family History Negative Mother      Heart Disease Father         decesased at 42 - MI     Heart Disease Sister          MI     Heart Disease Brother          MI     Heart Disease Brother         CABG AT 49     Lipids Sister      Lipids Sister      Lipids Sister      Family History Negative Brother        SOCIAL HISTORY:  Social History     Socioeconomic History     Marital status: Single     Spouse name: None     Number of children: None     Years of education: None     Highest education level: None   Social Needs     Financial resource strain: None     Food insecurity - worry: None     Food insecurity - inability: None     Transportation needs - medical: None     Transportation needs - non-medical: None   Occupational History     None   Tobacco Use     Smoking status:  "Former Smoker     Packs/day: 1.00     Years: 10.00     Pack years: 10.00     Types: Cigarettes     Last attempt to quit: 2011     Years since quittin.3     Smokeless tobacco: Never Used   Substance and Sexual Activity     Alcohol use: No     Drug use: No     Sexual activity: Yes     Partners: Female   Other Topics Concern     Parent/sibling w/ CABG, MI or angioplasty before 65F 55M? Not Asked   Social History Narrative     None       Review of Systems:  Skin:  Negative       Eyes:  Positive for glasses(reading only)    ENT:  Negative      Respiratory:  Positive for dyspnea on exertion;shortness of breath     Cardiovascular:  Negative Positive for;edema    Gastroenterology: Negative      Genitourinary:  Negative      Musculoskeletal:  Negative      Neurologic:  Negative      Psychiatric:  Negative      Heme/Lymph/Imm:  Negative      Endocrine:  Negative        Physical Exam:  Vitals: BP (!) 168/99   Pulse 83   Ht 1.702 m (5' 7.01\")   Wt 138.3 kg (305 lb)   BMI 47.76 kg/m      Constitutional:  cooperative;well nourished;in no acute distress obese increased BMI    Skin:  warm and dry to the touch, no apparent skin lesions or masses noted          Head:  normocephalic        Eyes:  pupils equal and round, conjunctivae and lids unremarkable, sclera white, no xanthalasma, EOMS intact, no nystagmus        Lymph:No Cervical lymphadenopathy present     ENT:  no pallor or cyanosis, dentition good        Neck:  carotid pulses are full and equal bilaterally, JVP normal, no carotid bruit        Respiratory:  normal breath sounds, clear to auscultation, normal A-P diameter, normal symmetry, normal respiratory excursion, no use of accessory muscles         Cardiac: regular rhythm, normal S1/S2, no S3 or S4, apical impulse not displaced, no murmurs, gallops or rubs irregularly irregular rhythm   no presence of murmur          pulses full and equal, no bruits auscultated                                        GI:  " abdomen soft, non-tender, BS normoactive, no mass, no HSM, no bruits obese      Extremities and Muscular Skeletal:  no deformities, clubbing, cyanosis, erythema observed   bilateral LE edema;trace          Neurological:  affect appropriate   mild limp    Psych:  Alert and Oriented x 3;affect appropriate, oriented to time, person and place        CC  Mary Yates MD  [address]  [address]  [place] [address]

## 2019-01-09 NOTE — LETTER
1/9/2019      Johnny Kay MD  [address] [facility] [address]      RE: Mike Clarkier       Dear Colleague,    I had the pleasure of seeing Mike Schultz in the [facility].    Service Date: 01/09/2019      HISTORY OF PRESENT ILLNESS:  I saw Mr. Schultz for followup of atrial fibrillation.  He is a 68-year-old white male with a history of symptomatic recurrent atrial fibrillation and severe obesity.  He previously failed to maintain sinus rhythm on flecainide therapy.  When I saw him in 2016, we agreed on rate control and anticoagulation.  Subsequently, he returned to see Dr. Day and underwent catheter ablation of atrial fibrillation on 11/06/2017.  He was able to maintain sinus rhythm for a couple of years and recently had recurrence of atrial fibrillation.  He is currently in persistent atrial fibrillation.  Symptomatically, he has fatigue and some shortness of breath with moderate exertion.  He has gained weight significantly.      He used to have an alcohol abuse problem, but at the present time he stated that he drinks about 2-3 beers a few times per week.      PHYSICAL EXAMINATION:   VITAL SIGNS:  Blood pressure was 168/99, heart rate 83 beats per minute, body weight 305 pounds.   HEART:  The cardiac rhythm was irregularly irregular.   EXTREMITIES:  He had bilateral trace leg edema.      EKG showed atrial fibrillation with heart rate about 99 beats per minute.      ASSESSMENT AND RECOMMENDATIONS:  Mr. Schultz is in persistent atrial fibrillation with mildly fast ventricular rate.  I have switched his amlodipine to Cardizem- mg once a day.  He will continue the other medications.  He will have a Zio Patch monitor in about 1 week for assessment of the ventricular rate during atrial fibrillation.  I plan to see him for followup in about 1 month.  If he is doing well on medical therapy, we may continue that for long-term.  On the other hand, if he does not do  well symptomatically, we may discuss redo ablation.      cc:   Johnny Kay MD    [address]  [address]         RAYMOND YATES MD             D: 2019   T: 2019   MT: HEIDI      Name:     TOO HOWARD   MRN:      [id number]        Account:      [id number]   :      1950           Service Date: 2019      Document: [id number]           Outpatient Encounter Medications as of 2019   Medication Sig Dispense Refill     allopurinol (ZYLOPRIM) 300 MG tablet Take 1 tablet (300 mg) by mouth daily 90 tablet 3     finasteride (PROPECIA) 1 MG tablet Take 1 mg by mouth daily.       furosemide (LASIX) 20 MG tablet Take 2 tablets (40 mg) by mouth daily 180 tablet 3     losartan (COZAAR) 100 MG tablet Take 1 tablet (100 mg) by mouth daily 90 tablet 3     potassium chloride (K-TAB,KLOR-CON) 10 MEQ tablet Take 2 tablets (20 mEq) by mouth daily       XARELTO 20 MG TABS tablet take 1 tablet (20mg) by mouth daily with dinner) avoid use of alcohol while using medication. 30 tablet 11     [DISCONTINUED] diltiazem ER COATED BEADS (CARDIZEM CD) 240 MG 24 hr capsule Take 1 capsule (240 mg) by mouth daily 90 capsule 3     [DISCONTINUED] metoprolol tartrate (LOPRESSOR) 50 MG tablet Take 1 tablet (50 mg) by mouth daily       acetaminophen (TYLENOL) 325 MG tablet Take 650 mg by mouth       Cyanocobalamin (VITAMIN B 12 PO)        INDOMETHACIN PO Take 50 mg by mouth daily as needed for moderate pain       KLOR-CON 10 MEQ CR tablet TAKE TWO TABLETS BY MOUTH DAILY  180 tablet 1     order for DME Equipment ordered: RESMED CPAP Mask type: Full face  Settings: 15       [DISCONTINUED] amLODIPine (NORVASC) 5 MG tablet Take 0.5 tablets (2.5 mg) by mouth daily       No facility-administered encounter medications on file as of 2019.        Again, thank you for allowing me to participate in the care of your patient.      Sincerely,    Raymond Yates MD     [facility]  [facility]

## 2019-01-10 ENCOUNTER — TELEPHONE (OUTPATIENT)
Dept: CARDIOLOGY | Facility: CLINIC | Age: 69
End: 2019-01-10

## 2019-01-10 DIAGNOSIS — I48.19 PERSISTENT ATRIAL FIBRILLATION (H): Primary | ICD-10-CM

## 2019-01-10 RX ORDER — AMLODIPINE BESYLATE 2.5 MG/1
2.5 TABLET ORAL DAILY
Qty: 90 TABLET | Refills: 3 | Status: SHIPPED | OUTPATIENT
Start: 2019-01-10 | End: 2019-12-23

## 2019-01-10 RX ORDER — METOPROLOL TARTRATE 50 MG
50 TABLET ORAL 2 TIMES DAILY
Qty: 180 TABLET | Refills: 3 | Status: SHIPPED | OUTPATIENT
Start: 2019-01-10 | End: 2019-04-19

## 2019-01-10 NOTE — TELEPHONE ENCOUNTER
Spoke to patient in follow up to OV with Dr Yates appt yesterday.  Patient had scheduled appt in error where he thought he was seeing Dr Day and instead saw Dr Yates who is not his regular provider.  Patient did speak to Dr Day on his way out s/p OV. Dr Day did review his records and asked this writer to contact patient with changes.  Verbal orders per Dr Day:  1. Do not start diltiazem as recommended per Dr Yates  2. Resume amlodipine 2.5mg po every day   3. Discontinue ZP as ordered  4. Increase metoprolol (lopressor) 50mg po bid  5. Follow up with Dr Day to discuss options regarding treatment for afib.  This is  scheduled for 2/11.  6. Check BP and HR and record and bring to appt  Medication list updated in epic.  Patient requesting refill on metoprolol sent to pharmacy as requested.  Patient provided verbal understanding of above. PRABHA Palomo

## 2019-01-10 NOTE — PROGRESS NOTES
Service Date: 01/09/2019      HISTORY OF PRESENT ILLNESS:  I saw Mr. Schultz for followup of atrial fibrillation.  He is a 68-year-old white male with a history of symptomatic recurrent atrial fibrillation and severe obesity.  He previously failed to maintain sinus rhythm on flecainide therapy.  When I saw him in 2016, we agreed on rate control and anticoagulation.  Subsequently, he returned to see Dr. Day and underwent catheter ablation of atrial fibrillation on 11/06/2017.  He was able to maintain sinus rhythm for a couple of years and recently had recurrence of atrial fibrillation.  He is currently in persistent atrial fibrillation.  Symptomatically, he has fatigue and some shortness of breath with moderate exertion.  He has gained weight significantly.      He used to have an alcohol abuse problem, but at the present time he stated that he drinks about 2-3 beers a few times per week.      PHYSICAL EXAMINATION:   VITAL SIGNS:  Blood pressure was 168/99, heart rate 83 beats per minute, body weight 305 pounds.   HEART:  The cardiac rhythm was irregularly irregular.   EXTREMITIES:  He had bilateral trace leg edema.      EKG showed atrial fibrillation with heart rate about 99 beats per minute.      ASSESSMENT AND RECOMMENDATIONS:  Mr. Schultz is in persistent atrial fibrillation with mildly fast ventricular rate.  I have switched his amlodipine to Cardizem- mg once a day.  He will continue the other medications.  He will have a Zio Patch monitor in about 1 week for assessment of the ventricular rate during atrial fibrillation.  I plan to see him for followup in about 1 month.  If he is doing well on medical therapy, we may continue that for long-term.  On the other hand, if he does not do well symptomatically, we may discuss redo ablation.      cc:   Johnny Kay MD    [address]  [address]         RAYMOND MATTHEW MD             D: 01/09/2019   T: 01/09/2019    MT: HEIDI      Name:     TOO HOWARD   MRN:      [id number]        Account:      [id number]   :      1950           Service Date: 2019      Document: [id number]

## 2019-01-24 NOTE — TELEPHONE ENCOUNTER
Ok to wait if feeling well (first available appointment).   Make sure to get labs done before he sees me.   Schedule all this follow-up now.

## 2019-01-24 NOTE — TELEPHONE ENCOUNTER
TIFFANIE for pt to call us back. Please help make lab appointment and OV with Elert. Also please let him know Metoprolol has been refilled.      Adela Edwards CMA

## 2019-01-24 NOTE — TELEPHONE ENCOUNTER
Patient informed. He feels ok to wait until next opening with PCP to be seen. Scheduled for 2/25 labs and 2/27 office visit with PCP. Patient also will be seeing Dr. Day on 2/11, thinks he is back in a fib. Has SOB at times. No CP. Has water retention. Patient says Dr. Day is aware of all of his symptoms. Also, of note, Dr. Day increased his metoprolol to 50 mg BID (patient was just taking once a day before).

## 2019-02-07 ENCOUNTER — TRANSFERRED RECORDS (OUTPATIENT)
Dept: HEALTH INFORMATION MANAGEMENT | Facility: CLINIC | Age: 69
End: 2019-02-07

## 2019-02-11 ENCOUNTER — OFFICE VISIT (OUTPATIENT)
Dept: CARDIOLOGY | Facility: CLINIC | Age: 69
End: 2019-02-11
Payer: COMMERCIAL

## 2019-02-11 VITALS
DIASTOLIC BLOOD PRESSURE: 96 MMHG | HEIGHT: 67 IN | OXYGEN SATURATION: 94 % | HEART RATE: 82 BPM | WEIGHT: 305 LBS | SYSTOLIC BLOOD PRESSURE: 138 MMHG | BODY MASS INDEX: 47.87 KG/M2

## 2019-02-11 DIAGNOSIS — I48.19 PERSISTENT ATRIAL FIBRILLATION (H): ICD-10-CM

## 2019-02-11 PROCEDURE — 99214 OFFICE O/P EST MOD 30 MIN: CPT | Performed by: INTERNAL MEDICINE

## 2019-02-11 PROCEDURE — 93000 ELECTROCARDIOGRAM COMPLETE: CPT | Performed by: INTERNAL MEDICINE

## 2019-02-11 RX ORDER — AMIODARONE HYDROCHLORIDE 200 MG/1
400 TABLET ORAL 2 TIMES DAILY
Qty: 90 TABLET | Refills: 3 | Status: SHIPPED | OUTPATIENT
Start: 2019-02-11 | End: 2019-02-27

## 2019-02-11 ASSESSMENT — MIFFLIN-ST. JEOR: SCORE: 2117.1

## 2019-02-11 NOTE — PROGRESS NOTES
HPI and Plan:   Thank you for allowing me to participate in the care of this delightful patient.  As you know, Brayden is a 68-year-old gentleman with a history of symptomatic recurrent persistent atrial fibrillation for the past several years now refractory to flecainide.  His symptoms from A. fib are mostly shortness of breath and not so much palpitations.  I recommended catheter based ablation which was performed November 2017 with pulmonary veins and SVC isolation.  He was doing quite well with improvement of his shortness of breath when I last saw him which was this past June.  Unfortunately a month ago he was noted to be in atrial fibrillation with controlled ventricular response.  He also noted worsening shortness of breath along with weight gain.    We discussed at length about progression of atrial fibrillation despite ablation.  In retrospect, given the persistent nature of his atrial fibrillation non-PV triggers such as left atrial posterior wall should have been isolated during the last ablation.  For now, since patient is doing better in sinus rhythm I would start him on amiodarone with plan for DC cardioversion in a few weeks time.  He has been on Xarelto nonstop over this past year.  I went over the side effects of amiodarone including inflammation of thyroid liver and lungs.  I have the patient come back to see Crystal Campos, 1 of our advanced practice providers in about a month.  Should the patient have recurrent atrial fibrillation in the future despite being on amiodarone or have side effects from amiodarone repeat ablation should be considered.    Orders Placed This Encounter   Procedures     Follow-Up with Cardiac Advanced Practice Provider     EKG 12-lead complete w/read - Clinics (performed today)       Orders Placed This Encounter   Medications     amiodarone (PACERONE/CODARONE) 200 MG tablet     Sig: Take 2 tablets (400 mg) by mouth 2 times daily For 1 week then 1 tablet (200 mg) daily      Dispense:  90 tablet     Refill:  3       Medications Discontinued During This Encounter   Medication Reason     potassium chloride (K-TAB,KLOR-CON) 10 MEQ tablet Medication Reconciliation Clean Up     amiodarone (PACERONE/CODARONE) 200 MG tablet Reorder         Encounter Diagnosis   Name Primary?     Persistent atrial fibrillation (H)        CURRENT MEDICATIONS:  Current Outpatient Medications   Medication Sig Dispense Refill     acetaminophen (TYLENOL) 325 MG tablet Take 650 mg by mouth       allopurinol (ZYLOPRIM) 300 MG tablet Take 1 tablet (300 mg) by mouth daily 90 tablet 3     amiodarone (PACERONE/CODARONE) 200 MG tablet Take 2 tablets (400 mg) by mouth 2 times daily For 1 week then 1 tablet (200 mg) daily 90 tablet 3     amLODIPine (NORVASC) 2.5 MG tablet Take 1 tablet (2.5 mg) by mouth daily 90 tablet 3     Cyanocobalamin (VITAMIN B 12 PO)        finasteride (PROPECIA) 1 MG tablet Take 1 mg by mouth daily.       furosemide (LASIX) 20 MG tablet Take 2 tablets (40 mg) by mouth daily 180 tablet 3     KLOR-CON 10 MEQ CR tablet TAKE TWO TABLETS BY MOUTH DAILY  180 tablet 1     losartan (COZAAR) 100 MG tablet Take 1 tablet (100 mg) by mouth daily 90 tablet 3     metoprolol tartrate (LOPRESSOR) 50 MG tablet Take 1 tablet (50 mg) by mouth 2 times daily 180 tablet 3     order for DME Equipment ordered: RESMED CPAP Mask type: Full face  Settings: 15       XARELTO 20 MG TABS tablet take 1 tablet (20mg) by mouth daily with dinner) avoid use of alcohol while using medication. 30 tablet 11     INDOMETHACIN PO Take 50 mg by mouth daily as needed for moderate pain         ALLERGIES     Allergies   Allergen Reactions     No Known Drug Allergies        PAST MEDICAL HISTORY:  Past Medical History:   Diagnosis Date     Essential hypertension, benign      H/O ETOH abuse      Herpes simplex without mention of complication      Hyperlipidemia      Obesity      Persistent atrial fibrillation (H)     persistent, [facility] 12/2017,  ablation 17     Tobacco use disorder     dced 2004       PAST SURGICAL HISTORY:  Past Surgical History:   Procedure Laterality Date     C NONSPECIFIC PROCEDURE      achilles tendon     C NONSPECIFIC PROCEDURE      knees, arthroscopy     C NONSPECIFIC PROCEDURE      basal cell skin ca, nose     C NONSPECIFIC PROCEDURE      flex sig; colonoscopy due 3/2008     C NONSPECIFIC PROCEDURE      cardioversion for atr fib     C TOTAL KNEE ARTHROPLASTY Left 2018    Dr. Malick Suarez     HERNIORRHAPHY UMBILICAL  2012    Procedure: HERNIORRHAPHY UMBILICAL;  UMBILICAL HERNIA REPAIR;  Surgeon: Ra Crocker MD;  Location: [facility]     ORTHOPEDIC SURGERY         FAMILY HISTORY:  Family History   Problem Relation Age of Onset     Family History Negative Mother      Heart Disease Father         decesased at 42 - MI     Heart Disease Sister          MI     Heart Disease Brother          MI     Heart Disease Brother         CABG AT 49     Lipids Sister      Lipids Sister      Lipids Sister      Family History Negative Brother        SOCIAL HISTORY:  Social History     Socioeconomic History     Marital status: Single     Spouse name: None     Number of children: None     Years of education: None     Highest education level: None   Social Needs     Financial resource strain: None     Food insecurity - worry: None     Food insecurity - inability: None     Transportation needs - medical: None     Transportation needs - non-medical: None   Occupational History     None   Tobacco Use     Smoking status: Former Smoker     Packs/day: 1.00     Years: 10.00     Pack years: 10.00     Types: Cigarettes     Last attempt to quit: 2011     Years since quittin.4     Smokeless tobacco: Never Used   Substance and Sexual Activity     Alcohol use: Yes     Comment: 3 shots per night     Drug use: No     Sexual activity: Yes     Partners: Female   Other Topics Concern     Parent/sibling w/ CABG, MI or  "angioplasty before 65F 55M? Not Asked   Social History Narrative     None       Review of Systems:  Skin:  Negative       Eyes:  Positive for glasses(reading only)    ENT:  Negative      Respiratory:  Positive for shortness of breath;dyspnea at rest;sleep apnea;CPAP     Cardiovascular:  Negative;palpitations;chest pain;syncope or near-syncope;cyanosis;exercise intolerance;fatigue;lightheadedness;dizziness Positive for;edema    Gastroenterology: Negative      Genitourinary:  Negative      Musculoskeletal:  Negative      Neurologic:  Negative      Psychiatric:  Negative      Heme/Lymph/Imm:  Negative      Endocrine:  Negative        Physical Exam:  Vitals: BP (!) 138/96 (BP Location: Left arm, Patient Position: Chair, Cuff Size: Adult Large)   Pulse 82   Ht 1.702 m (5' 7\")   Wt 138.3 kg (305 lb)   SpO2 94%   BMI 47.77 kg/m      Constitutional:  cooperative, alert and oriented, well developed, well nourished, in no acute distress obese      Skin:  warm and dry to the touch, no apparent skin lesions or masses noted          Head:  normocephalic, no masses or lesions        Eyes:  pupils equal and round, conjunctivae and lids unremarkable, sclera white, no xanthalasma, EOMS intact, no nystagmus        Lymph:No Cervical lymphadenopathy present     ENT:  no pallor or cyanosis, dentition good        Neck:  carotid pulses are full and equal bilaterally, JVP normal, no carotid bruit        Respiratory:  normal breath sounds, clear to auscultation, normal A-P diameter, normal symmetry, normal respiratory excursion, no use of accessory muscles         Cardiac:   irregularly irregular rhythm              pulses full and equal, no bruits auscultated                                        GI:  abdomen soft, non-tender, BS normoactive, no mass, no HSM, no bruits obese      Extremities and Muscular Skeletal:      bilateral LE edema;1+          Neurological:  no gross motor deficits        Psych:           CC  Mary Yates, " MD  [id number]  [id number]  [place] [id number]

## 2019-02-11 NOTE — LETTER
2/11/2019    Johnny Kay MD  [address] [facility] [address]    RE: Mike Schultz       Dear Colleague,    I had the pleasure of seeing Mike Schultz in the [facility].    HPI and Plan:   Thank you for allowing me to participate in the care of this delightful patient.  As you know, Brayden is a 68-year-old gentleman with a history of symptomatic recurrent persistent atrial fibrillation for the past several years now refractory to flecainide.  His symptoms from A. fib are mostly shortness of breath and not so much palpitations.  I recommended catheter based ablation which was performed November 2017 with pulmonary veins and SVC isolation.  He was doing quite well with improvement of his shortness of breath when I last saw him which was this past June.  Unfortunately a month ago he was noted to be in atrial fibrillation with controlled ventricular response.  He also noted worsening shortness of breath along with weight gain.    We discussed at length about progression of atrial fibrillation despite ablation.  In retrospect, given the persistent nature of his atrial fibrillation non-PV triggers such as left atrial posterior wall should have been isolated during the last ablation.  For now, since patient is doing better in sinus rhythm I would start him on amiodarone with plan for DC cardioversion in a few weeks time.  He has been on Xarelto nonstop over this past year.  I went over the side effects of amiodarone including inflammation of thyroid liver and lungs.  I have the patient come back to see Crystal Campos, 1 of our advanced practice providers in about a month.  Should the patient have recurrent atrial fibrillation in the future despite being on amiodarone or have side effects from amiodarone repeat ablation should be considered.    Orders Placed This Encounter   Procedures     Follow-Up with Cardiac Advanced Practice Provider     EKG 12-lead complete w/read - Clinics  (performed today)       Orders Placed This Encounter   Medications     amiodarone (PACERONE/CODARONE) 200 MG tablet     Sig: Take 2 tablets (400 mg) by mouth 2 times daily For 1 week then 1 tablet (200 mg) daily     Dispense:  90 tablet     Refill:  3       Medications Discontinued During This Encounter   Medication Reason     potassium chloride (K-TAB,KLOR-CON) 10 MEQ tablet Medication Reconciliation Clean Up     amiodarone (PACERONE/CODARONE) 200 MG tablet Reorder         Encounter Diagnosis   Name Primary?     Persistent atrial fibrillation (H)        CURRENT MEDICATIONS:  Current Outpatient Medications   Medication Sig Dispense Refill     acetaminophen (TYLENOL) 325 MG tablet Take 650 mg by mouth       allopurinol (ZYLOPRIM) 300 MG tablet Take 1 tablet (300 mg) by mouth daily 90 tablet 3     amiodarone (PACERONE/CODARONE) 200 MG tablet Take 2 tablets (400 mg) by mouth 2 times daily For 1 week then 1 tablet (200 mg) daily 90 tablet 3     amLODIPine (NORVASC) 2.5 MG tablet Take 1 tablet (2.5 mg) by mouth daily 90 tablet 3     Cyanocobalamin (VITAMIN B 12 PO)        finasteride (PROPECIA) 1 MG tablet Take 1 mg by mouth daily.       furosemide (LASIX) 20 MG tablet Take 2 tablets (40 mg) by mouth daily 180 tablet 3     KLOR-CON 10 MEQ CR tablet TAKE TWO TABLETS BY MOUTH DAILY  180 tablet 1     losartan (COZAAR) 100 MG tablet Take 1 tablet (100 mg) by mouth daily 90 tablet 3     metoprolol tartrate (LOPRESSOR) 50 MG tablet Take 1 tablet (50 mg) by mouth 2 times daily 180 tablet 3     order for DME Equipment ordered: RESMED CPAP Mask type: Full face  Settings: 15       XARELTO 20 MG TABS tablet take 1 tablet (20mg) by mouth daily with dinner) avoid use of alcohol while using medication. 30 tablet 11     INDOMETHACIN PO Take 50 mg by mouth daily as needed for moderate pain         ALLERGIES     Allergies   Allergen Reactions     No Known Drug Allergies        PAST MEDICAL HISTORY:  Past Medical History:   Diagnosis Date      Essential hypertension, benign      H/O ETOH abuse      Herpes simplex without mention of complication      Hyperlipidemia      Obesity      Persistent atrial fibrillation (H)     persistent, [facility] 2017, ablation 17     Tobacco use disorder     dced 2004       PAST SURGICAL HISTORY:  Past Surgical History:   Procedure Laterality Date     C NONSPECIFIC PROCEDURE      achilles tendon     C NONSPECIFIC PROCEDURE      knees, arthroscopy     C NONSPECIFIC PROCEDURE      basal cell skin ca, nose     C NONSPECIFIC PROCEDURE      flex sig; colonoscopy due 3/2008     C NONSPECIFIC PROCEDURE      cardioversion for atr fib     C TOTAL KNEE ARTHROPLASTY Left 2018    Dr. Malick Suarez     HERNIORRHAPHY UMBILICAL  2012    Procedure: HERNIORRHAPHY UMBILICAL;  UMBILICAL HERNIA REPAIR;  Surgeon: Ra Crocker MD;  Location: [facility]     ORTHOPEDIC SURGERY         FAMILY HISTORY:  Family History   Problem Relation Age of Onset     Family History Negative Mother      Heart Disease Father         decesased at 42 - MI     Heart Disease Sister          MI     Heart Disease Brother          MI     Heart Disease Brother         CABG AT 49     Lipids Sister      Lipids Sister      Lipids Sister      Family History Negative Brother        SOCIAL HISTORY:  Social History     Socioeconomic History     Marital status: Single     Spouse name: None     Number of children: None     Years of education: None     Highest education level: None   Social Needs     Financial resource strain: None     Food insecurity - worry: None     Food insecurity - inability: None     Transportation needs - medical: None     Transportation needs - non-medical: None   Occupational History     None   Tobacco Use     Smoking status: Former Smoker     Packs/day: 1.00     Years: 10.00     Pack years: 10.00     Types: Cigarettes     Last attempt to quit: 2011     Years since quittin.4     Smokeless tobacco: Never  "Used   Substance and Sexual Activity     Alcohol use: Yes     Comment: 3 shots per night     Drug use: No     Sexual activity: Yes     Partners: Female   Other Topics Concern     Parent/sibling w/ CABG, MI or angioplasty before 65F 55M? Not Asked   Social History Narrative     None       Review of Systems:  Skin:  Negative       Eyes:  Positive for glasses(reading only)    ENT:  Negative      Respiratory:  Positive for shortness of breath;dyspnea at rest;sleep apnea;CPAP     Cardiovascular:  Negative;palpitations;chest pain;syncope or near-syncope;cyanosis;exercise intolerance;fatigue;lightheadedness;dizziness Positive for;edema    Gastroenterology: Negative      Genitourinary:  Negative      Musculoskeletal:  Negative      Neurologic:  Negative      Psychiatric:  Negative      Heme/Lymph/Imm:  Negative      Endocrine:  Negative        Physical Exam:  Vitals: BP (!) 138/96 (BP Location: Left arm, Patient Position: Chair, Cuff Size: Adult Large)   Pulse 82   Ht 1.702 m (5' 7\")   Wt 138.3 kg (305 lb)   SpO2 94%   BMI 47.77 kg/m       Constitutional:  cooperative, alert and oriented, well developed, well nourished, in no acute distress obese      Skin:  warm and dry to the touch, no apparent skin lesions or masses noted          Head:  normocephalic, no masses or lesions        Eyes:  pupils equal and round, conjunctivae and lids unremarkable, sclera white, no xanthalasma, EOMS intact, no nystagmus        Lymph:No Cervical lymphadenopathy present     ENT:  no pallor or cyanosis, dentition good        Neck:  carotid pulses are full and equal bilaterally, JVP normal, no carotid bruit        Respiratory:  normal breath sounds, clear to auscultation, normal A-P diameter, normal symmetry, normal respiratory excursion, no use of accessory muscles         Cardiac:   irregularly irregular rhythm              pulses full and equal, no bruits auscultated                                        GI:  abdomen soft, non-tender, " BS normoactive, no mass, no HSM, no bruits obese      Extremities and Muscular Skeletal:      bilateral LE edema;1+          Neurological:  no gross motor deficits        Psych:           CC  Mary Yates MD  [address]  [address]  [place] [id number]                Thank you for allowing me to participate in the care of your patient.      Sincerely,     Fady Kent MD     [facility]    cc:   Mary Yates MD  [address]  [address]  [place] [id number]

## 2019-02-11 NOTE — LETTER
2/11/2019    Johnny Kay MD  [address] [facility] [address]    RE: Mike Schultz       Dear Colleague,    I had the pleasure of seeing Mike Schultz in the [facility].    HPI and Plan:   Thank you for allowing me to participate in the care of this delightful patient.  As you know, Brayden is a 68-year-old gentleman with a history of symptomatic recurrent persistent atrial fibrillation for the past several years now refractory to flecainide.  His symptoms from A. fib are mostly shortness of breath and not so much palpitations.  I recommended catheter based ablation which was performed November 2017 with pulmonary veins and SVC isolation.  He was doing quite well with improvement of his shortness of breath when I last saw him which was this past June.  Unfortunately a month ago he was noted to be in atrial fibrillation with controlled ventricular response.  He also noted worsening shortness of breath along with weight gain.    We discussed at length about progression of atrial fibrillation despite ablation.  In retrospect, given the persistent nature of his atrial fibrillation non-PV triggers such as left atrial posterior wall should have been isolated during the last ablation.  For now, since patient is doing better in sinus rhythm I would start him on amiodarone with plan for DC cardioversion in a few weeks time.  He has been on Xarelto nonstop over this past year.  I went over the side effects of amiodarone including inflammation of thyroid liver and lungs.  I have the patient come back to see Crystal Campos, 1 of our advanced practice providers in about a month.  Should the patient have recurrent atrial fibrillation in the future despite being on amiodarone or have side effects from amiodarone repeat ablation should be considered.    Orders Placed This Encounter   Procedures     Follow-Up with Cardiac Advanced Practice Provider     EKG 12-lead complete w/read - Clinics  (performed today)       Orders Placed This Encounter   Medications     amiodarone (PACERONE/CODARONE) 200 MG tablet     Sig: Take 2 tablets (400 mg) by mouth 2 times daily For 1 week then 1 tablet (200 mg) daily     Dispense:  90 tablet     Refill:  3       Medications Discontinued During This Encounter   Medication Reason     potassium chloride (K-TAB,KLOR-CON) 10 MEQ tablet Medication Reconciliation Clean Up     amiodarone (PACERONE/CODARONE) 200 MG tablet Reorder         Encounter Diagnosis   Name Primary?     Persistent atrial fibrillation (H)        CURRENT MEDICATIONS:  Current Outpatient Medications   Medication Sig Dispense Refill     acetaminophen (TYLENOL) 325 MG tablet Take 650 mg by mouth       allopurinol (ZYLOPRIM) 300 MG tablet Take 1 tablet (300 mg) by mouth daily 90 tablet 3     amiodarone (PACERONE/CODARONE) 200 MG tablet Take 2 tablets (400 mg) by mouth 2 times daily For 1 week then 1 tablet (200 mg) daily 90 tablet 3     amLODIPine (NORVASC) 2.5 MG tablet Take 1 tablet (2.5 mg) by mouth daily 90 tablet 3     Cyanocobalamin (VITAMIN B 12 PO)        finasteride (PROPECIA) 1 MG tablet Take 1 mg by mouth daily.       furosemide (LASIX) 20 MG tablet Take 2 tablets (40 mg) by mouth daily 180 tablet 3     KLOR-CON 10 MEQ CR tablet TAKE TWO TABLETS BY MOUTH DAILY  180 tablet 1     losartan (COZAAR) 100 MG tablet Take 1 tablet (100 mg) by mouth daily 90 tablet 3     metoprolol tartrate (LOPRESSOR) 50 MG tablet Take 1 tablet (50 mg) by mouth 2 times daily 180 tablet 3     order for DME Equipment ordered: RESMED CPAP Mask type: Full face  Settings: 15       XARELTO 20 MG TABS tablet take 1 tablet (20mg) by mouth daily with dinner) avoid use of alcohol while using medication. 30 tablet 11     INDOMETHACIN PO Take 50 mg by mouth daily as needed for moderate pain         ALLERGIES     Allergies   Allergen Reactions     No Known Drug Allergies        PAST MEDICAL HISTORY:  Past Medical History:   Diagnosis Date      Essential hypertension, benign      H/O ETOH abuse      Herpes simplex without mention of complication      Hyperlipidemia      Obesity      Persistent atrial fibrillation (H)     persistent, DCCV 2017, ablation 17     Tobacco use disorder     dced 2004       PAST SURGICAL HISTORY:  Past Surgical History:   Procedure Laterality Date     C NONSPECIFIC PROCEDURE      achilles tendon     C NONSPECIFIC PROCEDURE      knees, arthroscopy     C NONSPECIFIC PROCEDURE      basal cell skin ca, nose     C NONSPECIFIC PROCEDURE      flex sig; colonoscopy due 3/2008     C NONSPECIFIC PROCEDURE      cardioversion for atr fib     C TOTAL KNEE ARTHROPLASTY Left 2018    Dr. Malick Suarez     HERNIORRHAPHY UMBILICAL  2012    Procedure: HERNIORRHAPHY UMBILICAL;  UMBILICAL HERNIA REPAIR;  Surgeon: Ra Crocker MD;  Location: [facility]     ORTHOPEDIC SURGERY         FAMILY HISTORY:  Family History   Problem Relation Age of Onset     Family History Negative Mother      Heart Disease Father         decesased at 42 - MI     Heart Disease Sister          MI     Heart Disease Brother          MI     Heart Disease Brother         CABG AT 49     Lipids Sister      Lipids Sister      Lipids Sister      Family History Negative Brother        SOCIAL HISTORY:  Social History     Socioeconomic History     Marital status: Single     Spouse name: None     Number of children: None     Years of education: None     Highest education level: None   Social Needs     Financial resource strain: None     Food insecurity - worry: None     Food insecurity - inability: None     Transportation needs - medical: None     Transportation needs - non-medical: None   Occupational History     None   Tobacco Use     Smoking status: Former Smoker     Packs/day: 1.00     Years: 10.00     Pack years: 10.00     Types: Cigarettes     Last attempt to quit: 2011     Years since quittin.4     Smokeless tobacco: Never  "Used   Substance and Sexual Activity     Alcohol use: Yes     Comment: 3 shots per night     Drug use: No     Sexual activity: Yes     Partners: Female   Other Topics Concern     Parent/sibling w/ CABG, MI or angioplasty before 65F 55M? Not Asked   Social History Narrative     None       Review of Systems:  Skin:  Negative       Eyes:  Positive for glasses(reading only)    ENT:  Negative      Respiratory:  Positive for shortness of breath;dyspnea at rest;sleep apnea;CPAP     Cardiovascular:  Negative;palpitations;chest pain;syncope or near-syncope;cyanosis;exercise intolerance;fatigue;lightheadedness;dizziness Positive for;edema    Gastroenterology: Negative      Genitourinary:  Negative      Musculoskeletal:  Negative      Neurologic:  Negative      Psychiatric:  Negative      Heme/Lymph/Imm:  Negative      Endocrine:  Negative        Physical Exam:  Vitals: BP (!) 138/96 (BP Location: Left arm, Patient Position: Chair, Cuff Size: Adult Large)   Pulse 82   Ht 1.702 m (5' 7\")   Wt 138.3 kg (305 lb)   SpO2 94%   BMI 47.77 kg/m       Constitutional:  cooperative, alert and oriented, well developed, well nourished, in no acute distress obese      Skin:  warm and dry to the touch, no apparent skin lesions or masses noted          Head:  normocephalic, no masses or lesions        Eyes:  pupils equal and round, conjunctivae and lids unremarkable, sclera white, no xanthalasma, EOMS intact, no nystagmus        Lymph:No Cervical lymphadenopathy present     ENT:  no pallor or cyanosis, dentition good        Neck:  carotid pulses are full and equal bilaterally, JVP normal, no carotid bruit        Respiratory:  normal breath sounds, clear to auscultation, normal A-P diameter, normal symmetry, normal respiratory excursion, no use of accessory muscles         Cardiac:   irregularly irregular rhythm              pulses full and equal, no bruits auscultated                                        GI:  abdomen soft, non-tender, " BS normoactive, no mass, no HSM, no bruits obese      Extremities and Muscular Skeletal:      bilateral LE edema;1+          Neurological:  no gross motor deficits        Psych:                       Thank you for allowing me to participate in the care of your patient.    Sincerely,     Fady Kent MD     [facility]

## 2019-02-14 ENCOUNTER — TELEPHONE (OUTPATIENT)
Dept: INTERNAL MEDICINE | Facility: CLINIC | Age: 69
End: 2019-02-14

## 2019-02-14 NOTE — TELEPHONE ENCOUNTER
Reason for Call:  Other call back    Detailed comments: Pt has lab tests ordered by Dr Kay scheduled for 2/25/19.  Pt had lab work done through [facility] ordered by Dr Cervantes.  He wants to make sure he's not having duplicate lab work.  Please call him back to discuss.    Phone Number Patient can be reached at: Cell number on file:    Telephone Information:   Mobile [phone]       Best Time: anytime    Can we leave a detailed message on this number? YES    Call taken on 2/14/2019 at 3:08 PM by DIONISIO DO

## 2019-02-14 NOTE — TELEPHONE ENCOUNTER
Called and left message for patient to return call as the labs he had done by Dr. Cervantes are not viewable in Epic. Wanted to know what tests he had done and if there are duplicates if he could call and have them fax over the results to us for Dr. Kay to review.   Tori Reid, [place] on 2/14/2019 at 3:34 PM

## 2019-02-15 NOTE — TELEPHONE ENCOUNTER
"Patient states that he is unable to fax labs over.  Writer asked if patient could send via [email or web address].  Patient replied, \"I don't really know how to do that.  I'm not that smart.\"  From what writer could gather patient had uric acid done but will still need hemoglobin A1C, lipid panel and possibly BMP.  Patient will bring records with to appointment and show lab before he has blood drawn to ensure he is not receiving 'duplicate labs' as he is concerned of insurance coverage.    "

## 2019-02-18 DIAGNOSIS — G47.33 OBSTRUCTIVE SLEEP APNEA (ADULT) (PEDIATRIC): Primary | ICD-10-CM

## 2019-02-21 DIAGNOSIS — G47.33 OBSTRUCTIVE SLEEP APNEA (ADULT) (PEDIATRIC): Primary | ICD-10-CM

## 2019-02-25 DIAGNOSIS — E78.5 HYPERLIPIDEMIA LDL GOAL <130: ICD-10-CM

## 2019-02-25 DIAGNOSIS — M10.9 GOUT OF FOOT, UNSPECIFIED CAUSE, UNSPECIFIED CHRONICITY, UNSPECIFIED LATERALITY: ICD-10-CM

## 2019-02-25 DIAGNOSIS — R73.01 IMPAIRED FASTING GLUCOSE: ICD-10-CM

## 2019-02-25 DIAGNOSIS — I10 ESSENTIAL HYPERTENSION: ICD-10-CM

## 2019-02-25 LAB
ANION GAP SERPL CALCULATED.3IONS-SCNC: 4 MMOL/L (ref 3–14)
BUN SERPL-MCNC: 22 MG/DL (ref 7–30)
CALCIUM SERPL-MCNC: 8.4 MG/DL (ref 8.5–10.1)
CHLORIDE SERPL-SCNC: 105 MMOL/L (ref 94–109)
CHOLEST SERPL-MCNC: 193 MG/DL
CO2 SERPL-SCNC: 32 MMOL/L (ref 20–32)
CREAT SERPL-MCNC: 1.24 MG/DL (ref 0.66–1.25)
GFR SERPL CREATININE-BSD FRML MDRD: 60 ML/MIN/{1.73_M2}
GLUCOSE SERPL-MCNC: 129 MG/DL (ref 70–99)
HBA1C MFR BLD: 5.7 % (ref 0–5.6)
HDLC SERPL-MCNC: 45 MG/DL
LDLC SERPL CALC-MCNC: 115 MG/DL
NONHDLC SERPL-MCNC: 148 MG/DL
POTASSIUM SERPL-SCNC: 4.1 MMOL/L (ref 3.4–5.3)
SODIUM SERPL-SCNC: 141 MMOL/L (ref 133–144)
TRIGL SERPL-MCNC: 165 MG/DL
URATE SERPL-MCNC: 5.1 MG/DL (ref 3.5–7.2)

## 2019-02-25 PROCEDURE — 84550 ASSAY OF BLOOD/URIC ACID: CPT | Performed by: INTERNAL MEDICINE

## 2019-02-25 PROCEDURE — 83036 HEMOGLOBIN GLYCOSYLATED A1C: CPT | Performed by: INTERNAL MEDICINE

## 2019-02-25 PROCEDURE — 80048 BASIC METABOLIC PNL TOTAL CA: CPT | Performed by: INTERNAL MEDICINE

## 2019-02-25 PROCEDURE — 36415 COLL VENOUS BLD VENIPUNCTURE: CPT | Performed by: INTERNAL MEDICINE

## 2019-02-25 PROCEDURE — 80061 LIPID PANEL: CPT | Performed by: INTERNAL MEDICINE

## 2019-02-26 ENCOUNTER — ANESTHESIA (OUTPATIENT)
Dept: SURGERY | Facility: CLINIC | Age: 69
End: 2019-02-26
Payer: COMMERCIAL

## 2019-02-26 ENCOUNTER — ANESTHESIA EVENT (OUTPATIENT)
Dept: SURGERY | Facility: CLINIC | Age: 69
End: 2019-02-26
Payer: COMMERCIAL

## 2019-02-26 ENCOUNTER — HOSPITAL ENCOUNTER (OUTPATIENT)
Facility: CLINIC | Age: 69
Discharge: HOME | End: 2019-02-26
Admitting: INTERNAL MEDICINE
Payer: COMMERCIAL

## 2019-02-26 ENCOUNTER — HOSPITAL ENCOUNTER (OUTPATIENT)
Age: 69
End: 2019-02-26
Attending: INTERNAL MEDICINE | Admitting: INTERNAL MEDICINE
Payer: COMMERCIAL

## 2019-02-26 ENCOUNTER — HOSPITAL ENCOUNTER (OUTPATIENT)
Dept: MEDSURG UNIT | Facility: CLINIC | Age: 69
End: 2019-02-26
Attending: INTERNAL MEDICINE | Admitting: RADIOLOGY
Payer: COMMERCIAL

## 2019-02-26 ENCOUNTER — TELEPHONE (OUTPATIENT)
Dept: CARDIOLOGY | Facility: CLINIC | Age: 69
End: 2019-02-26

## 2019-02-26 VITALS
SYSTOLIC BLOOD PRESSURE: 138 MMHG | DIASTOLIC BLOOD PRESSURE: 97 MMHG | HEART RATE: 62 BPM | HEIGHT: 69 IN | OXYGEN SATURATION: 96 % | BODY MASS INDEX: 45.05 KG/M2 | RESPIRATION RATE: 16 BRPM | TEMPERATURE: 97.5 F | WEIGHT: 304.2 LBS

## 2019-02-26 DIAGNOSIS — I48.19 PERSISTENT ATRIAL FIBRILLATION (H): ICD-10-CM

## 2019-02-26 DIAGNOSIS — I48.19 PERSISTENT ATRIAL FIBRILLATION (H): Primary | ICD-10-CM

## 2019-02-26 LAB
INR PPP: 2.04 (ref 0.86–1.14)
MAGNESIUM SERPL-MCNC: 1.8 MG/DL (ref 1.6–2.3)
POTASSIUM SERPL-SCNC: 3.8 MMOL/L (ref 3.4–5.3)

## 2019-02-26 PROCEDURE — 93005 ELECTROCARDIOGRAM TRACING: CPT

## 2019-02-26 PROCEDURE — 84132 ASSAY OF SERUM POTASSIUM: CPT | Performed by: INTERNAL MEDICINE

## 2019-02-26 PROCEDURE — 92960 CARDIOVERSION ELECTRIC EXT: CPT | Performed by: INTERNAL MEDICINE

## 2019-02-26 PROCEDURE — 85610 PROTHROMBIN TIME: CPT | Mod: QW | Performed by: INTERNAL MEDICINE

## 2019-02-26 PROCEDURE — 83735 ASSAY OF MAGNESIUM: CPT | Performed by: INTERNAL MEDICINE

## 2019-02-26 PROCEDURE — 93010 ELECTROCARDIOGRAM REPORT: CPT | Performed by: INTERNAL MEDICINE

## 2019-02-26 PROCEDURE — 36415 COLL VENOUS BLD VENIPUNCTURE: CPT | Performed by: INTERNAL MEDICINE

## 2019-02-26 PROCEDURE — 36591 DRAW BLOOD OFF VENOUS DEVICE: CPT

## 2019-02-26 PROCEDURE — 37000008 ZZH ANESTHESIA TECHNICAL FEE, 1ST 30 MIN

## 2019-02-26 PROCEDURE — 92960 CARDIOVERSION ELECTRIC EXT: CPT

## 2019-02-26 PROCEDURE — 85610 PROTHROMBIN TIME: CPT | Performed by: INTERNAL MEDICINE

## 2019-02-26 PROCEDURE — 25000128 H RX IP 250 OP 636: Performed by: NURSE ANESTHETIST, CERTIFIED REGISTERED

## 2019-02-26 PROCEDURE — 40000010 ZZH STATISTIC ANES STAT CODE-CRNA PER MINUTE

## 2019-02-26 RX ORDER — POTASSIUM CHLORIDE 1500 MG/1
20 TABLET, EXTENDED RELEASE ORAL
Status: DISCONTINUED | OUTPATIENT
Start: 2019-02-26 | End: 2019-02-26 | Stop reason: HOSPADM

## 2019-02-26 RX ORDER — POTASSIUM CHLORIDE 1500 MG/1
40 TABLET, EXTENDED RELEASE ORAL
Status: DISCONTINUED | OUTPATIENT
Start: 2019-02-26 | End: 2019-02-26 | Stop reason: HOSPADM

## 2019-02-26 RX ORDER — NALOXONE HYDROCHLORIDE 0.4 MG/ML
.1-.4 INJECTION, SOLUTION INTRAMUSCULAR; INTRAVENOUS; SUBCUTANEOUS
Status: DISCONTINUED | OUTPATIENT
Start: 2019-02-26 | End: 2019-02-26 | Stop reason: HOSPADM

## 2019-02-26 RX ORDER — FLUMAZENIL 0.1 MG/ML
0.2 INJECTION, SOLUTION INTRAVENOUS
Status: DISCONTINUED | OUTPATIENT
Start: 2019-02-26 | End: 2019-02-26 | Stop reason: HOSPADM

## 2019-02-26 RX ORDER — MAGNESIUM SULFATE HEPTAHYDRATE 40 MG/ML
2 INJECTION, SOLUTION INTRAVENOUS
Status: DISCONTINUED | OUTPATIENT
Start: 2019-02-26 | End: 2019-02-26 | Stop reason: HOSPADM

## 2019-02-26 RX ORDER — PROPOFOL 10 MG/ML
INJECTION, EMULSION INTRAVENOUS PRN
Status: DISCONTINUED | OUTPATIENT
Start: 2019-02-26 | End: 2019-02-26

## 2019-02-26 RX ORDER — SODIUM CHLORIDE 9 MG/ML
INJECTION, SOLUTION INTRAVENOUS CONTINUOUS
Status: DISCONTINUED | OUTPATIENT
Start: 2019-02-26 | End: 2019-02-26 | Stop reason: HOSPADM

## 2019-02-26 RX ORDER — ATROPINE SULFATE 0.1 MG/ML
.5-1 INJECTION INTRAVENOUS
Status: DISCONTINUED | OUTPATIENT
Start: 2019-02-26 | End: 2019-02-26 | Stop reason: HOSPADM

## 2019-02-26 RX ADMIN — PROPOFOL 100 MG: 10 INJECTION, EMULSION INTRAVENOUS at 10:47

## 2019-02-26 RX ADMIN — PROPOFOL 100 MG: 10 INJECTION, EMULSION INTRAVENOUS at 10:45

## 2019-02-26 ASSESSMENT — ENCOUNTER SYMPTOMS
SEIZURES: 0
DYSRHYTHMIAS: 1

## 2019-02-26 ASSESSMENT — LIFESTYLE VARIABLES: TOBACCO_USE: 0

## 2019-02-26 ASSESSMENT — MIFFLIN-ST. JEOR: SCORE: 2137.28

## 2019-02-26 NOTE — IP AVS SNAPSHOT
MRN:[id number]                      After Visit Summary   2/26/2019    Mike Schultz    MRN: [id number]           Visit Information        Department      2/26/2019  8:38 AM [facility]          Review of your medicines      UNREVIEWED medicines. Ask your doctor about these medicines       Dose / Directions   acetaminophen 325 MG tablet  Commonly known as:  TYLENOL      Dose:  650 mg  Take 650 mg by mouth  Refills:  0     allopurinol 300 MG tablet  Commonly known as:  ZYLOPRIM  Used for:  Gout of foot      Dose:  300 mg  Take 1 tablet (300 mg) by mouth daily  Quantity:  90 tablet  Refills:  3     amiodarone 200 MG tablet  Commonly known as:  PACERONE/CODARONE  Used for:  Persistent atrial fibrillation (H)      Dose:  400 mg  Take 2 tablets (400 mg) by mouth 2 times daily For 1 week then 1 tablet (200 mg) daily  Quantity:  90 tablet  Refills:  3     amLODIPine 2.5 MG tablet  Commonly known as:  NORVASC  Used for:  Persistent atrial fibrillation (H)      Dose:  2.5 mg  Take 1 tablet (2.5 mg) by mouth daily  Quantity:  90 tablet  Refills:  3     furosemide 20 MG tablet  Commonly known as:  LASIX  Used for:  Peripheral edema      Dose:  40 mg  Take 2 tablets (40 mg) by mouth daily  Quantity:  180 tablet  Refills:  3     INDOMETHACIN PO      Dose:  50 mg  Take 50 mg by mouth daily as needed for moderate pain  Refills:  0     KLOR-CON 10 MEQ CR tablet  Used for:  Peripheral edema  Generic drug:  potassium chloride ER      TAKE TWO TABLETS BY MOUTH DAILY  Quantity:  180 tablet  Refills:  1     losartan 100 MG tablet  Commonly known as:  COZAAR  Used for:  Essential hypertension      Dose:  100 mg  Take 1 tablet (100 mg) by mouth daily  Quantity:  90 tablet  Refills:  3     metoprolol tartrate 50 MG tablet  Commonly known as:  LOPRESSOR  Used for:  Persistent atrial fibrillation (H)      Dose:  50 mg  Take 1 tablet (50 mg) by mouth 2 times daily  Quantity:  180 tablet  Refills:  3      PROPECIA 1 MG tablet  Generic drug:  finasteride      Dose:  1 mg  Take 1 mg by mouth daily.  Refills:  0     VITAMIN B 12 PO      Refills:  0     XARELTO 20 MG Tabs tablet  Used for:  Persistent atrial fibrillation (H)  Generic drug:  rivaroxaban ANTICOAGULANT      take 1 tablet (20mg) by mouth daily with dinner) avoid use of alcohol while using medication.  Quantity:  30 tablet  Refills:  11        CONTINUE these medicines which have NOT CHANGED       Dose / Directions   order for DME      Equipment ordered: RESMED CPAP Mask type: Full face  Settings: 15  Refills:  0              Protect others around you: Learn how to safely use, store and throw away your medicines at www.disposemymeds.org.       Follow-ups after your visit       Your next 10 appointments already scheduled    Feb 26, 2019 10:30 AM CST  Cardioversion External with [facility] ([facility])  [facility] (--) [address]  [place] [address]  [phone]   1) NPO for 8 hours prior to the procedure except for sips of water with medications. 2) Hold insulin or oral diabetic medications morning of procedure. May take   dose long acting insulin. Follow further instructions of PMD. 3) Continue daily Coumadin. ~ If taking Digoxin, hold AM of procedure. ~ Plan to have a responsible adult take you home after the exam. You may not drive, take a bus or taxi by yourself. ~ A responsible adult must stay with you for 24 hours after the test.   Feb 26, 2019  Procedure with GENERIC ANESTHESIA PROVIDER  [facility] (--) [address]  [place] [address]  [phone]   Feb 27, 2019  3:30 PM CST  [facility] [address] [place] [address] [phone]   Bring a current list of meds and any records pertaining to this visit. For Physicals, please bring immunization records and any forms  needing to be filled out. Please arrive 10 minutes early to complete paperwork.   Mar 12, 2019  8:30 AM CDT  [facility] EP RETURN with Natalie Campos PA-C  [facility] ([facility]) [address] [place] [id number] [phone] OPT 2      Care Instructions       Further instructions from your care team       Cardioversion Discharge Instructions    After you go home:       For 24 hours - due to the sedation you received:      Have an adult stay with you for 24 hours.     Relax and take it easy.    Do NOT make any important or legal decisions.    Do NOT drive or operate machines at home or at work.    Do NOT drink alcohol.    Diet:      Start with clear liquids and progress to your normal diet as you feel able.    Medicines:      Take your medications, including blood thinners, unless your provider tells you not to.    If you have stopped any medications, check with your provider about when to restart them.    Follow Up Appointments:      Follow up with your cardiologist at [facility] of patient preference as instructed.    Follow up with your primary care provider as needed.    Post cardioversion:    The skin on your chest or back may feel tender for 48 hours.  If your skin is tender, you may:      Use a cold pack on the site. Never use ice directly on your skin. Use the cold pack for 20 minutes. Remove it for at least 30 minutes before re-using.    Apply 1% hydrocortisone cream to the skin (sold at drug stores)    Take Advil (Ibuprofen) or Tylenol (Acetaminophen) per your provider's recommendations.      Call your provider if you have:      Weakness, dizziness, lightheadedness, or fainting.    Shortness of breath.    Irregular heartbeat, feelings of your heart fluttering or beating fast, hard or palpitations.     More than minor skin discomfort or redness where the cardioversion pads were placed.    Questions or concerns.      Call 911 if  "you have:      Pain in your chest, arm, shoulder, neck, or upper back.    You have problems speaking or seeing.    Weakness in your arm or leg.    You are unable to move your arm or leg.    You have uncontrolled bleeding.         [facility]:    [phone] UMP (7 days a week)          Additional Information About Your Visit       MyChart Information    [employer] gives you secure access to your electronic health record. If you see a primary care provider, you can also send messages to your care team and make appointments. If you have questions, please call your primary care clinic.  If you do not have a primary care provider, please call [phone] and they will assist you.       Care EveryWhere ID    This is your Care EveryWhere ID. This could be used by other organizations to access your [facility] medical records  [id number]       Your Vitals Were     Blood Pressure   146/110   (BP Location: Left arm)          Pulse   62          Temperature   97.5  F (36.4  C) (Oral)          Respirations   18          Height   1.74 m (5' 8.5\")             Weight   138 kg (304 lb 3.2 oz)    Pulse Oximetry   95%    BMI (Body Mass Index)   45.58 kg/m           Primary Care Provider Office Phone # Fax #    Johnny Kay [phone]      Equal Access to Services    SABAS STOKES AH: Hadii aad ku hadasho Soomaali, waaxda luqadaha, qaybta kaalmada adeegyada, waxay idiin haycuauhtemocn juli olea. So [facility] [phone].    ATENCIÓN: Si habla español, tiene a hightower disposición servicios gratuitos de asistencia lingüística. Llame al [phone].    We comply with applicable federal civil rights laws and [place] laws. We do not discriminate on the basis of race, color, national origin, age, disability, sex, sexual orientation, or gender identity.           Thank you!    Thank you for choosing [facility] for your care. Our goal is always to provide you with excellent care. " Hearing back from our patients is one way we can continue to improve our services. Please take a few minutes to complete the written survey that you may receive in the mail after you visit with us. Thank you!            Medication List      Medications          Morning Afternoon Evening Bedtime As Needed    order for DME  INSTRUCTIONS:  Equipment ordered: RESMED CPAP Mask type: Full face  Settings: 15                       ASK your doctor about these medications          Morning Afternoon Evening Bedtime As Needed    acetaminophen 325 MG tablet  Also known as:  TYLENOL  INSTRUCTIONS:  Take 650 mg by mouth                     allopurinol 300 MG tablet  Also known as:  ZYLOPRIM  INSTRUCTIONS:  Take 1 tablet (300 mg) by mouth daily  Doctor's comments:  Profile Rx: patient will call when needed                     amiodarone 200 MG tablet  Also known as:  PACERONE/CODARONE  INSTRUCTIONS:  Take 2 tablets (400 mg) by mouth 2 times daily For 1 week then 1 tablet (200 mg) daily                     amLODIPine 2.5 MG tablet  Also known as:  NORVASC  INSTRUCTIONS:  Take 1 tablet (2.5 mg) by mouth daily                     furosemide 20 MG tablet  Also known as:  LASIX  INSTRUCTIONS:  Take 2 tablets (40 mg) by mouth daily  Doctor's comments:  Profile Rx: patient will call when needed                     INDOMETHACIN PO  INSTRUCTIONS:  Take 50 mg by mouth daily as needed for moderate pain                     KLOR-CON 10 MEQ CR tablet  INSTRUCTIONS:  TAKE TWO TABLETS BY MOUTH DAILY  Generic drug:  potassium chloride ER                     losartan 100 MG tablet  Also known as:  COZAAR  INSTRUCTIONS:  Take 1 tablet (100 mg) by mouth daily  Doctor's comments:  Profile Rx: patient will call when needed                     metoprolol tartrate 50 MG tablet  Also known as:  LOPRESSOR  INSTRUCTIONS:  Take 1 tablet (50 mg) by mouth 2 times daily                     PROPECIA 1 MG tablet  INSTRUCTIONS:  Take 1 mg by mouth daily.  Generic  drug:  finasteride                     VITAMIN B 12 PO                     XARELTO 20 MG Tabs tablet  INSTRUCTIONS:  take 1 tablet (20mg) by mouth daily with dinner) avoid use of alcohol while using medication.  Generic drug:  rivaroxaban ANTICOAGULANT

## 2019-02-26 NOTE — PROGRESS NOTES
Cardioversion performed, defib at 200J 3 times, sedation per anesthesia with 200mg propafol. Pt tolerated well, EKG done showing Afib CVR. Pt instructed to follow up with cardiologist and continue medication  as ordered.  Pt discharged to home with wife.

## 2019-02-26 NOTE — ANESTHESIA PREPROCEDURE EVALUATION
Procedure: * No procedures listed *  Preop diagnosis: * No pre-op diagnosis entered *    Allergies   Allergen Reactions     No Known Drug Allergies      Past Medical History:   Diagnosis Date     Essential hypertension, benign      H/O ETOH abuse      Herpes simplex without mention of complication      Hyperlipidemia      Obesity      Persistent atrial fibrillation (H)     persistent, DCCV 2017, ablation 17     Tobacco use disorder     dced 2004     Past Surgical History:   Procedure Laterality Date     C NONSPECIFIC PROCEDURE      achilles tendon     C NONSPECIFIC PROCEDURE      knees, arthroscopy     C NONSPECIFIC PROCEDURE      basal cell skin ca, nose     C NONSPECIFIC PROCEDURE      flex sig; colonoscopy due 3/2008     C NONSPECIFIC PROCEDURE      cardioversion for atr fib     C TOTAL KNEE ARTHROPLASTY Left 2018    Dr. Malick Suarez     HERNIORRHAPHY UMBILICAL  2012    Procedure: HERNIORRHAPHY UMBILICAL;  UMBILICAL HERNIA REPAIR;  Surgeon: Ra Crocker MD;  Location: [facility]     ORTHOPEDIC SURGERY       Social History     Tobacco Use     Smoking status: Former Smoker     Packs/day: 1.00     Years: 10.00     Pack years: 10.00     Types: Cigarettes     Last attempt to quit: 2011     Years since quittin.5     Smokeless tobacco: Never Used   Substance Use Topics     Alcohol use: Yes     Comment: 3 shots per night     Prior to Admission medications    Medication Sig Start Date End Date Taking? Authorizing Provider   amLODIPine (NORVASC) 5 MG tablet Take 1 tablet (5 mg) by mouth 2 times daily  Patient taking differently: Take 5 mg by mouth daily  17  Yes Natalie Campos PA-C   metoprolol (LOPRESSOR) 50 MG tablet Take 1 tablet (50 mg) by mouth 2 times daily  Patient taking differently: Take 50 mg by mouth daily  17  Yes Natalie Campos PA-C   amiodarone (PACERONE/CODARONE) 200 MG tablet Take 1 tablet (200 mg) by mouth daily starting   11/22/17  Yes Natalie Campos PA-C   losartan (COZAAR) 100 MG tablet Take 1 tablet by mouth daily 10/11/17  Yes Johnny Kay MD   rivaroxaban ANTICOAGULANT (XARELTO) 20 MG TABS tablet Take 1 tablet (20 mg) by mouth daily (with dinner) AVIOD USE OF ALCOHOL WHILE USING MEDICATION 5/5/17  Yes Johnny Kay MD   furosemide (LASIX) 20 MG tablet Take 2 tablets (40 mg) by mouth daily  Patient taking differently: Take 40 mg by mouth 2 times daily  3/9/17  Yes Johnny Kay MD   potassium chloride SA (K-DUR/KLOR-CON M) 10 MEQ CR tablet Take 2 tablets (20 mEq) by mouth daily  Patient taking differently: Take 20 mEq by mouth 2 times daily  3/9/17  Yes Johnny Kay MD   Cyanocobalamin (VITAMIN B 12 PO)    Yes Reported, Patient   allopurinol (ZYLOPRIM) 300 MG tablet  5/5/16  Yes Reported, Patient   acetaminophen (TYLENOL) 325 MG tablet Take 650 mg by mouth 2/13/17   Reported, Patient   senna (SENOKOT) 8.6 MG tablet Take 8.6 mg by mouth 2/16/17   Reported, Patient   order for DME Equipment ordered: RESMED CPAP Mask type: Full face  Settings: 15    Reported, Patient   finasteride (PROPECIA) 1 MG tablet Take 1 mg by mouth daily.    Reported, Patient     Current Facility-Administered Medications Ordered in Epic   Medication Dose Route Frequency Last Rate Last Dose     atropine injection 0.5-1 mg  0.5-1 mg Intravenous Once PRN         flumazenil (ROMAZICON) injection 0.2 mg  0.2 mg Intravenous q1 min prn         magnesium sulfate 2 g in water intermittent infusion  2 g Intravenous Q1H PRN         naloxone (NARCAN) injection 0.1-0.4 mg  0.1-0.4 mg Intravenous Q2 Min PRN         potassium chloride ER (K-DUR/KLOR-CON M) CR tablet 20 mEq  20 mEq Oral Q1H PRN         potassium chloride ER (K-DUR/KLOR-CON M) CR tablet 40 mEq  40 mEq Oral Q1H PRN         sodium chloride (PF) 0.9% PF flush 3 mL  3 mL Intravenous Q1H PRN         sodium chloride (PF) 0.9% PF flush 3 mL  3 mL Intravenous Q8H PRN         sodium  chloride 0.9% infusion   Intravenous Continuous         No current [facility]-ordered outpatient medications on file.       Wt Readings from Last 1 Encounters:   02/26/19 138 kg (304 lb 3.2 oz)     Temp Readings from Last 1 Encounters:   02/26/19 36.4  C (97.5  F) (Oral)     BP Readings from Last 6 Encounters:   02/26/19 138/60   02/11/19 (!) 138/96   01/09/19 (!) 168/99   06/20/18 153/83   04/06/18 104/60   03/02/18 138/78     Pulse Readings from Last 4 Encounters:   02/26/19 62   02/11/19 82   01/09/19 83   06/20/18 52     Resp Readings from Last 1 Encounters:   02/26/19 16     SpO2 Readings from Last 1 Encounters:   12/19/17 94%     Recent Labs   Lab Test  11/06/17   1159  10/26/17   0849   NA  141  142   POTASSIUM  3.9  4.2   CHLORIDE  106  104   CO2  31  30   ANIONGAP  4  8   GLC  113*  113*   BUN  20  29   CR  1.00  1.35*   ASHELY  8.6  8.7     Recent Labs   Lab Test  10/12/17   0814  11/29/16   1840   AST  23  27   ALT  40  47   ALKPHOS  83  99   BILITOTAL  1.0  0.9     Recent Labs   Lab Test  11/06/17   1159  02/03/17   0939 04/28/16   WBC  10.5   --   6.8   HGB  16.2  16.2  15.7   PLT  190   --   299     No results for input(s): ABO, RH in the last 71954 hours.  Recent Labs   Lab Test  11/06/17   1159  08/16/12   0951   INR  1.66*  0.95      No results for input(s): TROPI in the last 47321 hours.  No results for input(s): PH, PCO2, PO2, HCO3 in the last 68249 hours.  No results for input(s): HCG in the last 09448 hours.  No results found for this or any previous visit (from the past 744 hour(s)).    RECENT LABS:   ECG:   ECHO:       Anesthesia Evaluation     . Pt has had prior anesthetic.     No history of anesthetic complications          ROS/MED HX    ENT/Pulmonary:     (+)sleep apnea, , . .   (-) tobacco use   Neurologic:      (-) seizures and CVA   Cardiovascular: Comment: S/p afib ablation 11/17    (+) Dyslipidemia, hypertension----. Taking blood thinners : . . . :. dysrhythmias a-fib, . Previous cardiac  testing Echodate:results:Name: TOO HOWARD  MRN: [id number]  : 1950  Study Date: 10/18/2017 07:53 AM  Age: 67 yrs  Gender: Male  Patient Location: [facility]  Reason For Study: , Persistent atrial fibrillation  History: A-FIB  Ordering Physician: MADDIE BURNETTE  Referring Physician: MADDIE BURNETTE  Performed By: Tomas Cruz RDCS     BSA: 2.3 m2  Height: 66 in  Weight: 289 lb  HR: 72  BP: 159/90 mmHg  _____________________________________________________________________________  __        Procedure  Complete Echo Adult. Contrast Optison.  _____________________________________________________________________________  __        Interpretation Summary     ild to moderate or moderate valvular aortic stenosis noited and the mean AoV  pressure gradient is 11mmHg (previously 7 mmHg).. There is trace to mild  aortic regurgitation. The aortic valve is not well visualized.  The ascending aorta is mildly dilated.  Left ventricular systolic function is normal. The visual ejection fraction is  estimated at 55-60%. There is mild concentric left ventricular hypertrophy.  There is moderate biatrial enlargement.  There is mild (1+) mitral regurgitation.  The rhythm was atrial fibrillation.  There is now aortic stenosis, probably mild to moderate. LV function is  probably normal. Technically difficult, suboptimal study. Contrast was used  without apparent complications.  _____________________________________________________________________________  __        Left Ventricle  The left ventricle is normal in size. There is mild concentric left  ventricular hypertrophy. Left ventricular systolic function is normal. The  visual ejection fraction is estimated at 55-60%. E by E prime ratio is greater  than 15, that likely suggests increased left ventricular filling pressures. No  regional wall motion abnormalities noted.     Right Ventricle  Borderline right ventricular enlargement. The right ventricular systolic  function is  normal.     Atria  There is moderate biatrial enlargement. There is no atrial shunt seen.     Mitral Valve  There is mild (1+) mitral regurgitation.        Tricuspid Valve  The right ventricular systolic pressure is approximated at 31mmHg plus the  right atrial pressure. There is mild (1+) tricuspid regurgitation. Normal IVC  (1.5-2.5cm) with >50% respiratory collapse; right atrial pressure is estimated  at 5-10mmHg.     Aortic Valve  The aortic valve is not well visualized. Thickened aortic valve leaflets.  There is trace to mild aortic regurgitation. The mean AoV pressure gradient is  11mmHg. Moderate valvular aortic stenosis.     Pulmonic Valve  There is no pulmonic valvular stenosis.     Vessels  The ascending aorta is Mildly dilated. It is 4.2 cm above ST ridge. Inferior  vena cava not well visualized for estimation of right atrial pressure.     Pericardium  The pericardium appears normal.        Rhythm  The rhythm was atrial fibrillation.date: results: date: results: date: results:          METS/Exercise Tolerance:  >4 METS   Hematologic:         Musculoskeletal:         GI/Hepatic:        (-) GERD and liver disease   Renal/Genitourinary:      (-) renal disease   Endo:     (+) Obesity, .   (-) Type II DM and thyroid disease   Psychiatric:     (+) psychiatric history other (comment) (Etoh abuse hx)      Infectious Disease:         Malignancy:         Other:                     Physical Exam  Normal systems: cardiovascular, pulmonary and dental    Airway   Mallampati: II  TM distance: >3 FB  Neck ROM: full    Dental     Cardiovascular       Pulmonary                          Anesthesia Plan      History & Physical Review  History and physical reviewed and following examination; no interval change.    ASA Status:  3 .    NPO Status:  > 8 hours    Plan for General and Other with Intravenous induction.   PONV prophylaxis:  Ondansetron (or other 5HT-3) and Dexamethasone or Solumedrol       Postoperative  Care  Postoperative pain management:  IV analgesics.      Consents  Anesthetic plan, risks, benefits and alternatives discussed with:  Patient and Spouse..                .

## 2019-02-26 NOTE — PROGRESS NOTES
Pt admitted for DCCV, VSS pt in Afib on monitor, discharge instructions reviewed with pt and wife, questions answered and both state understanding.

## 2019-02-26 NOTE — ANESTHESIA POSTPROCEDURE EVALUATION
Patient: Mike Schultz    Procedure(s):  ANESTHESIA CARDIOVERSION (PRIETO)    Diagnosis:A FIB  Diagnosis Additional Information: No value filed.    Anesthesia Type:  General, Other    Note:  Anesthesia Post Evaluation    Patient location during evaluation: Cardiac Cath Lab (care suites)  Patient participation: Able to participate in evaluation but full recovery from regional anesthesia has not yet occurred and is not anticipated to occur within 48 hours  Level of consciousness: awake and alert  Pain management: adequate  Airway patency: patent  Cardiovascular status: acceptable and hemodynamically stable  Respiratory status: acceptable and unassisted  Hydration status: acceptable  PONV: none       Comments: Cardioversion unsuccessful x 3        Last vitals:  Vitals:    02/26/19 1104 02/26/19 1106 02/26/19 1108   BP: 106/79 106/79 108/87   Pulse: 63  62   Resp: 13 23 14   Temp:      SpO2: 96% 96% 95%         Electronically Signed By: Kurt Hoffman DO  February 26, 2019  11:10 AM

## 2019-02-26 NOTE — DISCHARGE INSTRUCTIONS
Cardioversion Discharge Instructions    After you go home:       For 24 hours - due to the sedation you received:      Have an adult stay with you for 24 hours.     Relax and take it easy.    Do NOT make any important or legal decisions.    Do NOT drive or operate machines at home or at work.    Do NOT drink alcohol.    Diet:      Start with clear liquids and progress to your normal diet as you feel able.    Medicines:      Take your medications, including blood thinners, unless your provider tells you not to.    If you have stopped any medications, check with your provider about when to restart them.    Follow Up Appointments:      Follow up with your cardiologist at [facility] of patient preference as instructed.    Follow up with your primary care provider as needed.    Post cardioversion:    The skin on your chest or back may feel tender for 48 hours.  If your skin is tender, you may:      Use a cold pack on the site. Never use ice directly on your skin. Use the cold pack for 20 minutes. Remove it for at least 30 minutes before re-using.    Apply 1% hydrocortisone cream to the skin (sold at drug stores)    Take Advil (Ibuprofen) or Tylenol (Acetaminophen) per your provider's recommendations.      Call your provider if you have:      Weakness, dizziness, lightheadedness, or fainting.    Shortness of breath.    Irregular heartbeat, feelings of your heart fluttering or beating fast, hard or palpitations.     More than minor skin discomfort or redness where the cardioversion pads were placed.    Questions or concerns.      Call 911 if you have:      Pain in your chest, arm, shoulder, neck, or upper back.    You have problems speaking or seeing.    Weakness in your arm or leg.    You are unable to move your arm or leg.    You have uncontrolled bleeding.         [facility] [place]:    [phone] [facility] (7 days a week)

## 2019-02-26 NOTE — ANESTHESIA CARE TRANSFER NOTE
Patient: Mike Schultz    Procedure(s):  ANESTHESIA CARDIOVERSION (PRIETO)    Diagnosis: A FIB  Diagnosis Additional Information: No value filed.    Anesthesia Type:   General, Other     Note:    Destination: Care suites 20.  Comments: Spont resps.  Pt. arounding to voice.  VSS Report to RN      Vitals: (Last set prior to Anesthesia Care Transfer)    CRNA VITALS  2/26/2019 1037 - 2/26/2019 1107      2/26/2019             EKG:  Atrial fibrillation                Electronically Signed By: AYAN Pate CRNA  February 26, 2019  11:07 AM

## 2019-02-26 NOTE — TELEPHONE ENCOUNTER
Received signed orders for afib ablation.  Upcoming available date for ablation with Dr Day is on 3/7.  Offered appt time to patient.  He agreed to have procedure on this day.  Per signed orders patient to hold amiodarone for 7 days prior to appt.  Patient to stop after 2/27 dose.  Patient has appt on 3/4 with TOBI Rivas at that time H&P will be completed and risk and benefits will be discussed with patient.  Patient provided verbal understanding regarding above.  PRABHA Palomo

## 2019-02-26 NOTE — TELEPHONE ENCOUNTER
Spoke to patient in follow up to unsuccessful cardioversion today.  Reviewed with Dr Day.  He is recommending aflb ablation at this time.  Moved follow up appt with Crystal Campos to 3/4 per patient request so this can be discussed and scheduled.  PRABHA Palomo

## 2019-02-26 NOTE — PROCEDURES
Informed consent obtained for elective DC cardioversion of atrial fibrillation. Rhythm confirmed with pre-procedure ECG.  Serum potassium 3.8, creatinine.1.2. Patient is chronically maintained on metoprolol tartrate 50 mg two times daily, amiodarone started recently (currently on 200 mg daily), and on long-term uninterrupted rivaroxaban (Xarelto) anticoagulation.     No recent echocardiogram.  LVEF 55-60% (October 2017), no significant valve disease, moderate bi-atrial enlargement.    Cardioversion was unsuccessful, despite 3 attempts with 200 J synchronized DC shocks. Sedation by anesthesia team.      RECOMMENDATIONS:  1.  Continue Metoprolol, Amiodarone and uninterrupted Xarelto anticoagulation.  2.  Patient has scheduled clinic follow-up with electrophysiology [facility] on 12 March 2019.     Dr. BRAXTON Pérez MD [facility]  Cardiology.

## 2019-02-26 NOTE — IP AVS SNAPSHOT
[place] [address] [place] [address]  Phone:  [phone]                                    After Visit Summary   2/26/2019    Mike Schultz    MRN: [id number]           After Visit Summary Signature Page    I have received my discharge instructions, and my questions have been answered. I have discussed any challenges I see with this plan with the nurse or doctor.    ..........................................................................................................................................  Patient/Patient Representative Signature      ..........................................................................................................................................  Patient Representative Print Name and Relationship to Patient    ..................................................               ................................................  Date                                   Time    ..........................................................................................................................................  Reviewed by Signature/Title    ...................................................              ..............................................  Date                                               Time          22EPIC Rev 08/18

## 2019-02-27 ENCOUNTER — OFFICE VISIT (OUTPATIENT)
Dept: INTERNAL MEDICINE | Facility: CLINIC | Age: 69
End: 2019-02-27
Payer: COMMERCIAL

## 2019-02-27 VITALS
BODY MASS INDEX: 46.18 KG/M2 | DIASTOLIC BLOOD PRESSURE: 94 MMHG | WEIGHT: 308.2 LBS | HEART RATE: 64 BPM | TEMPERATURE: 97.9 F | OXYGEN SATURATION: 94 % | SYSTOLIC BLOOD PRESSURE: 152 MMHG

## 2019-02-27 DIAGNOSIS — I10 ESSENTIAL HYPERTENSION: ICD-10-CM

## 2019-02-27 DIAGNOSIS — I77.810 ASCENDING AORTA DILATATION (H): ICD-10-CM

## 2019-02-27 DIAGNOSIS — F10.20 ALCOHOLISM (H): Primary | ICD-10-CM

## 2019-02-27 DIAGNOSIS — E66.01 MORBID OBESITY DUE TO EXCESS CALORIES (H): ICD-10-CM

## 2019-02-27 DIAGNOSIS — R73.01 IMPAIRED FASTING GLUCOSE: ICD-10-CM

## 2019-02-27 PROCEDURE — 99215 OFFICE O/P EST HI 40 MIN: CPT | Performed by: INTERNAL MEDICINE

## 2019-02-27 RX ORDER — LORAZEPAM 0.5 MG/1
.5-1 TABLET ORAL EVERY 6 HOURS PRN
Qty: 30 TABLET | Refills: 0 | Status: SHIPPED | OUTPATIENT
Start: 2019-02-27 | End: 2019-07-08

## 2019-02-27 NOTE — PATIENT INSTRUCTIONS
PLAN:                                                      1.  MEDICATIONS:        - Resume taking furosemide regularly       - Continue other medications without change  2.  Try to limit sodium somewhat further  3.  Consider trying Weight Watchers, or similar program.   Consider 5440-7943 calorie diet, and gradually increase exercise > 3 hours per week.  4.  Follow-up in 3 months for blood pressure check.  5.  Consider going back to , which worked well in the past.   6.  If able to stop alcohol fully, we will use lorazepam 0.5-1.0 mg four times daily as needed for any withdrawal symptoms.   Do not drive on this, unless you have founds that is causes no sedation.

## 2019-02-28 LAB
INTERPRETATION ECG - MUSE: NORMAL
INTERPRETATION ECG - MUSE: NORMAL

## 2019-03-04 ENCOUNTER — OFFICE VISIT (OUTPATIENT)
Dept: CARDIOLOGY | Facility: CLINIC | Age: 69
End: 2019-03-04
Payer: COMMERCIAL

## 2019-03-04 VITALS
BODY MASS INDEX: 48.06 KG/M2 | HEIGHT: 67 IN | WEIGHT: 306.2 LBS | HEART RATE: 76 BPM | DIASTOLIC BLOOD PRESSURE: 108 MMHG | SYSTOLIC BLOOD PRESSURE: 165 MMHG

## 2019-03-04 DIAGNOSIS — I77.810 ASCENDING AORTA DILATATION (H): ICD-10-CM

## 2019-03-04 DIAGNOSIS — I48.19 PERSISTENT ATRIAL FIBRILLATION (H): Primary | ICD-10-CM

## 2019-03-04 DIAGNOSIS — I10 BENIGN ESSENTIAL HYPERTENSION: ICD-10-CM

## 2019-03-04 DIAGNOSIS — R60.0 PERIPHERAL EDEMA: ICD-10-CM

## 2019-03-04 PROCEDURE — 99214 OFFICE O/P EST MOD 30 MIN: CPT | Performed by: PHYSICIAN ASSISTANT

## 2019-03-04 PROCEDURE — 93000 ELECTROCARDIOGRAM COMPLETE: CPT | Performed by: PHYSICIAN ASSISTANT

## 2019-03-04 RX ORDER — POTASSIUM CHLORIDE 750 MG/1
20 TABLET, EXTENDED RELEASE ORAL 2 TIMES DAILY
Start: 2019-03-04 | End: 2019-03-12

## 2019-03-04 RX ORDER — FUROSEMIDE 20 MG
20 TABLET ORAL DAILY
COMMUNITY
End: 2019-03-04

## 2019-03-04 RX ORDER — FUROSEMIDE 20 MG
40 TABLET ORAL DAILY
Start: 2019-03-04 | End: 2019-03-12

## 2019-03-04 ASSESSMENT — MIFFLIN-ST. JEOR: SCORE: 2122.54

## 2019-03-04 NOTE — PROGRESS NOTES
HPI:   I had the pleasure of seeing Abraham when he came for follow up of atrial fibrillation.  He is a 68 year old who sees Dr. Day for his history of:    1. Atrial Fibrillation diagnosed in 2012, treated with flecainide and DCCV with success until 12/2016. He then developed fatigue/lack of energy and fluid retention and was back in AFib 12/2016.  He underwent PVI and SVC and CTI ablation 11/2017 with good success.  Unfortunately, back in AFib 1/2019. He was placed on amiodarone with attempt x 3 at DCCV on 2/26, which were unsuccessful in restoring sinus rhythm  2. Preserved EF based on echo 10/2017 and EF 51% on cMRI 10/2017  3. Benign Essential HTN   4. BOWEN  5. Myocardiac Scar noted on cardiac MRI 10/2017, consistent with prior MI in the left Cx territory. EF 51%  6. Alcohol use (5-6 shots/day)    When Dr. Day last saw Brayden 2/11/19, he noted increasing shortness of breath and weight gain.  He had been back in atrial fibrillation for about a month, and Dr. Day noted that he typically did much better in sinus rhythm.  Therefore, he initiated amiodarone therapy (loading dose 400 mg twice daily x 1 week, then 200 mg daily) and had him undergo DC cardioversion on 2/26.  As above, 3 attempts were made at DC cardioversion at 200 J and were not successful in restoring sinus rhythm.  Dr. Day recommended he meet with me to discuss repeat pulmonary vein isolation, potentially focusing on non-PV trigger such as the left atrial posterior wall.    Brayden tells me that he continues to feel very short of breath and has noted increasing weight.  He feels like he is gaining fluid weight in his legs and belly.  He denies orthopnea or PND, and remains compliant with CPAP therapy.  He denies any problems with chest pain, pressure or tightness.    He denies palpitations, lightheadedness or dizziness.    Unfortunately, he admits to drinking quite a bit more than he had back when he saw Dr. Day in 6/2018.  He typically had 1-2 drinks  once a week, but is now having 5-6 mini bottles (likely 1-1.5 ounces) of alcohol per night.    EKG today, which I overread, showed continued atrial fibrillation with occasional PVCs at 78 bpm  Echocardiogram 10/2017 showed normal ejection fraction 55-60% with mild left ventricular hypertrophy.  Mild to moderate/moderate aortic stenosis with a mean gradient of 11.  Aortic valve area was 1.4 cm .  1+ mitral regurgitation.  Ascending aorta was mildly dilated at 4.2 cm above the sinotubular ridge.  Cardiac MRI 10/2017 showed an EF of 51% with mild hypokinesis involving the basal inferolateral segment.  Evidence of previous myocardial infarction in the circumflex territory was noted.    Assessment & Plan:    1.  Persistent atrial fibrillation, now in the setting of heart failure    As above, underwent pulmonary vein isolation 11/2017.  He was in atrial fibrillation again 1/2019, and again had developed fluid overload with shortness of breath, lower extremity edema and weight gain    Heart rate on EKG today is controlled at 78 bpm    He ended up stopping his amiodarone at our request on 2/27 as we were planning to repeat a pulmonary vein isolation.  He has subsequently canceled that given that he knows that alcohol use is contributing to his recurrent atrial fibrillation and that is not yet under control.      Remains on Xarelto 20 mg daily for CHADSVASc of at least 3 (hypertension, age and coronary disease with scar noted on MRI).  Note that INR is higher than I would expect with Xarelto use (2.04) at the time of his cardioversion    PLAN:    At this time, we will continue rate control with metoprolol 50 mg twice daily    Agreed that we would want to get him back in sinus rhythm as he tends to do better in this, but agreed that he will have a hard time remaining in sinus rhythm with his continued alcohol use and heart failure    Hepatic panel when he sees me next week to ensure no evidence of severe liver disease causing  "increase in INR    Discontinue alcohol use    Close follow-up      2.  Fluid overload    On exam, he has 2-3+ lower extremity edema, 10-12 cm of JVD and hepatojugular reflux.  His belly is tense.  He does have bibasilar crackles, right greater than left.  These are mild    He is currently taking Lasix 20 mg daily about 4-5 times per week.  He ends up not taking the sometimes given his work.  He is taking potassium 20 mEq (2 of the 10 mEq tablets)    I think he easily has 20-30 pounds of fluid on him today      He complains of dyspnea, belly fullness and edema, but has not had orthopnea or PND    PLAN:    Increase Lasix to 40 mg daily x 5 days (3/5-3/9).  Decreased to 20 mg daily starting Cuate 3/10    Increase potassium as well, increasing to 20 mEq twice daily x 5 days, decreasing to 20 mEq daily starting Cuate 3/10      Continue beta-blocker and ARB therapy    Limit sodium to less than 2300 mg daily    Cut out alcohol entirely    Elevate legs/wear compression stockings.    See me in 1 week with CMP    I am hoping that we can get him euvolemic and off of alcohol in the next 2-3 weeks, and could set up his A. fib ablation off of amiodarone in late March/early April    3.  Benign essential hypertension    Blood pressure much too high today    Remains compliant with amlodipine 2.5 mg daily, metoprolol tartrate 50 mg twice daily and losartan 100 mg daily    PLAN:    Discontinue alcohol    Increase diuretic    See me back next week    Brayden has a lot of work to get him euvolemic/off of alcohol before we can try to get him back in sinus rhythm with the best results.  He understands this, and is motivated to make some big changes over the next few weeks.  Certainly, continued sobriety will be important.  He does not think that he needs to attend any AA meetings, as he has \"done this before\" and \"just needs to stop.\"        Crystal Campos PA-C, MSPAS      Orders Placed This Encounter   Procedures     Basic metabolic panel "     Hepatic panel     Follow-Up with Cardiac Advanced Practice Provider     EKG 12-lead complete w/read - Clinics (performed today)     Echocardiogram Complete     Orders Placed This Encounter   Medications     DISCONTD: furosemide (LASIX) 20 MG tablet     Sig: Take 20 mg by mouth daily Takes an additional tablet (20mg) PRN     potassium chloride ER (KLOR-CON) 10 MEQ CR tablet     Sig: Take 2 tablets (20 mEq) by mouth 2 times daily X 5 days, then decrease to 2 tabs daily     furosemide (LASIX) 20 MG tablet     Sig: Take 2 tablets (40 mg) by mouth daily X 5 days, then decrease to 20 mg daily     Medications Discontinued During This Encounter   Medication Reason     furosemide (LASIX) 20 MG tablet Medication Reconciliation Clean Up     KLOR-CON 10 MEQ CR tablet      furosemide (LASIX) 20 MG tablet Reorder         Encounter Diagnoses   Name Primary?     Persistent atrial fibrillation (H) Yes     Ascending aorta dilatation (H)      Benign essential hypertension      Peripheral edema        CURRENT MEDICATIONS:  Current Outpatient Medications   Medication Sig Dispense Refill     acetaminophen (TYLENOL) 325 MG tablet Take 650 mg by mouth as needed        allopurinol (ZYLOPRIM) 300 MG tablet Take 1 tablet (300 mg) by mouth daily 90 tablet 3     amLODIPine (NORVASC) 2.5 MG tablet Take 1 tablet (2.5 mg) by mouth daily 90 tablet 3     finasteride (PROPECIA) 1 MG tablet Take 1 mg by mouth daily.       furosemide (LASIX) 20 MG tablet Take 2 tablets (40 mg) by mouth daily X 5 days, then decrease to 20 mg daily       LORazepam (ATIVAN) 0.5 MG tablet Take 1-2 tablets (0.5-1 mg) by mouth every 6 hours as needed for anxiety (alcohol withdrawal) 30 tablet 0     losartan (COZAAR) 100 MG tablet Take 1 tablet (100 mg) by mouth daily 90 tablet 3     metoprolol tartrate (LOPRESSOR) 50 MG tablet Take 1 tablet (50 mg) by mouth 2 times daily 180 tablet 3     potassium chloride ER (KLOR-CON) 10 MEQ CR tablet Take 2 tablets (20 mEq) by mouth 2  times daily X 5 days, then decrease to 2 tabs daily       XARELTO 20 MG TABS tablet take 1 tablet (20mg) by mouth daily with dinner) avoid use of alcohol while using medication. 30 tablet 11     order for DME Equipment ordered: RESMED CPAP Mask type: Full face  Settings: 15         ALLERGIES     Allergies   Allergen Reactions     No Known Drug Allergies        PAST MEDICAL HISTORY:  Past Medical History:   Diagnosis Date     Essential hypertension, benign      H/O ETOH abuse      Herpes simplex without mention of complication      Hyperlipidemia      Obesity      Persistent atrial fibrillation (H)     persistent, DCCV 2017, ablation 17     Tobacco use disorder     dced 2004       PAST SURGICAL HISTORY:  Past Surgical History:   Procedure Laterality Date     ANESTHESIA CARDIOVERSION N/A 2019    Procedure: ANESTHESIA CARDIOVERSION (PRIETO);  Surgeon: GENERIC ANESTHESIA PROVIDER;  Location:  OR     C NONSPECIFIC PROCEDURE      achilles tendon     C NONSPECIFIC PROCEDURE      knees, arthroscopy     C NONSPECIFIC PROCEDURE      basal cell skin ca, nose     C NONSPECIFIC PROCEDURE      flex sig; colonoscopy due 3/2008     C NONSPECIFIC PROCEDURE      cardioversion for atr fib     C TOTAL KNEE ARTHROPLASTY Left 2018    Dr. Malick Suarez     HERNIORRHAPHY UMBILICAL  2012    Procedure: HERNIORRHAPHY UMBILICAL;  UMBILICAL HERNIA REPAIR;  Surgeon: Ra Crocker MD;  Location: [facility]     ORTHOPEDIC SURGERY         FAMILY HISTORY:  Family History   Problem Relation Age of Onset     Family History Negative Mother      Heart Disease Father         decesased at 42 - MI     Heart Disease Sister          MI     Heart Disease Brother          MI     Heart Disease Brother         CABG AT 49     Lipids Sister      Lipids Sister      Lipids Sister      Family History Negative Brother        SOCIAL HISTORY:  Social History     Socioeconomic History     Marital status: Single     Spouse  name: None     Number of children: None     Years of education: None     Highest education level: None   Occupational History     None   Social Needs     Financial resource strain: None     Food insecurity:     Worry: None     Inability: None     Transportation needs:     Medical: None     Non-medical: None   Tobacco Use     Smoking status: Former Smoker     Packs/day: 1.00     Years: 10.00     Pack years: 10.00     Types: Cigarettes     Last attempt to quit: 2011     Years since quittin.5     Smokeless tobacco: Never Used   Substance and Sexual Activity     Alcohol use: Yes     Comment: 3 shots per night     Drug use: No     Sexual activity: Yes     Partners: Female   Lifestyle     Physical activity:     Days per week: None     Minutes per session: None     Stress: None   Relationships     Social connections:     Talks on phone: None     Gets together: None     Attends Anabaptism service: None     Active member of club or organization: None     Attends meetings of clubs or organizations: None     Relationship status: None     Intimate partner violence:     Fear of current or ex partner: None     Emotionally abused: None     Physically abused: None     Forced sexual activity: None   Other Topics Concern     Parent/sibling w/ CABG, MI or angioplasty before 65F 55M? Not Asked   Social History Narrative     None       Review of Systems:  Skin:  Negative     Eyes:  Positive for glasses(reading only)  ENT:  Negative    Respiratory:  Positive for shortness of breath;dyspnea at rest;sleep apnea;CPAP  Cardiovascular:  palpitations;chest pain;syncope or near-syncope;cyanosis;lightheadedness;dizziness;Negative for Positive for;edema;exercise intolerance;fatigue  Gastroenterology: Negative    Genitourinary:  Negative    Musculoskeletal:  Negative    Neurologic:  Negative    Psychiatric:  Negative    Heme/Lymph/Imm:  Negative    Endocrine:  Negative      Physical Exam:  Vitals: BP (!) 165/108   Pulse 76   Ht 1.702 m  "(5' 7\")   Wt 138.9 kg (306 lb 3.2 oz)   BMI 47.96 kg/m      Constitutional:  cooperative, alert and oriented, well developed, well nourished, in no acute distress obese      Skin:  warm and dry to the touch, no apparent skin lesions or masses noted        Head:  normocephalic        Eyes:  pupils equal and round, conjunctivae and lids unremarkable, sclera white, no xanthalasma, EOMS intact, no nystagmus        ENT:  no pallor or cyanosis, dentition good        Neck:  no carotid bruit JVP >12;JVP 10-12;hepatojugular reflux      Chest:      R>L bibasilar crackles (soft)    Cardiac: no murmurs, gallops or rubs detected irregularly irregular rhythm                Abdomen:  non-tender;BS normoactive obese      Vascular: not assessed this visit                                      Extremities and Back:           Neurological:  no gross motor deficits          Recent Lab Results:    LIVER ENZYME RESULTS:  Lab Results   Component Value Date    AST 23 10/12/2017    ALT 40 10/12/2017     Component      Latest Ref Rng & Units 2/25/2019   Sodium      133 - 144 mmol/L 141   Potassium      3.4 - 5.3 mmol/L 4.1   Chloride      94 - 109 mmol/L 105   Carbon Dioxide      20 - 32 mmol/L 32   Anion Gap      3 - 14 mmol/L 4   Glucose      70 - 99 mg/dL 129 (H)   Urea Nitrogen      7 - 30 mg/dL 22   Creatinine      0.66 - 1.25 mg/dL 1.24   GFR Estimate      >60 mL/min/1.73:m2 60 (L)   GFR Estimate If Black      >60 mL/min/1.73:m2 69     Component      Latest Ref Rng & Units 2/25/2019   Cholesterol      <200 mg/dL 193   Triglycerides      <150 mg/dL 165 (H)   HDL Cholesterol      >39 mg/dL 45   LDL Cholesterol Calculated      <100 mg/dL 115 (H)   Non HDL Cholesterol      <130 mg/dL 148 (H)     Component      Latest Ref Rng & Units 2/25/2019   Hemoglobin A1C      0 - 5.6 % 5.7 (H)     INR RESULTS:  Lab Results   Component Value Date    INR 2.04 (H) 02/26/2019    INR 1.66 (H) 11/06/2017                 "

## 2019-03-04 NOTE — LETTER
3/4/2019    Johnny Kay MD  [address] [facility] [id number]    RE: Mike Schultz       Dear Colleague,    I had the pleasure of seeing Mike BRAXTON ClarkAntoine in the [facility].    HPI:   I had the pleasure of seeing Abraham when he came for follow up of atrial fibrillation.  He is a 68 year old who sees Dr. Day for his history of:    1. Atrial Fibrillation diagnosed in 2012, treated with flecainide and DCCV with success until 12/2016. He then developed fatigue/lack of energy and fluid retention and was back in AFib 12/2016.  He underwent PVI and SVC and CTI ablation 11/2017 with good success.  Unfortunately, back in AFib 1/2019. He was placed on amiodarone with attempt x 3 at DCCV on 2/26, which were unsuccessful in restoring sinus rhythm  2. Preserved EF based on echo 10/2017 and EF 51% on cMRI 10/2017  3. Benign Essential HTN   4. BOWEN  5. Myocardiac Scar noted on cardiac MRI 10/2017, consistent with prior MI in the left Cx territory. EF 51%  6. Alcohol use (5-6 shots/day)    When Dr. Day last saw Brayden 2/11/19, he noted increasing shortness of breath and weight gain.  He had been back in atrial fibrillation for about a month, and Dr. Day noted that he typically did much better in sinus rhythm.  Therefore, he initiated amiodarone therapy (loading dose 400 mg twice daily x 1 week, then 200 mg daily) and had him undergo DC cardioversion on 2/26.  As above, 3 attempts were made at DC cardioversion at 200 J and were not successful in restoring sinus rhythm.  Dr. Day recommended he meet with me to discuss repeat pulmonary vein isolation, potentially focusing on non-PV trigger such as the left atrial posterior wall.    Brayden tells me that he continues to feel very short of breath and has noted increasing weight.  He feels like he is gaining fluid weight in his legs and belly.  He denies orthopnea or PND, and remains compliant with CPAP therapy.  He denies any problems with chest  pain, pressure or tightness.    He denies palpitations, lightheadedness or dizziness.    Unfortunately, he admits to drinking quite a bit more than he had back when he saw Dr. Day in 6/2018.  He typically had 1-2 drinks once a week, but is now having 5-6 mini bottles (likely 1-1.5 ounces) of alcohol per night.    EKG today, which I overread, showed continued atrial fibrillation with occasional PVCs at 78 bpm  Echocardiogram 10/2017 showed normal ejection fraction 55-60% with mild left ventricular hypertrophy.  Mild to moderate/moderate aortic stenosis with a mean gradient of 11.  Aortic valve area was 1.4 cm .  1+ mitral regurgitation.  Ascending aorta was mildly dilated at 4.2 cm above the sinotubular ridge.  Cardiac MRI 10/2017 showed an EF of 51% with mild hypokinesis involving the basal inferolateral segment.  Evidence of previous myocardial infarction in the circumflex territory was noted.    Assessment & Plan:    1.  Persistent atrial fibrillation, now in the setting of heart failure    As above, underwent pulmonary vein isolation 11/2017.  He was in atrial fibrillation again 1/2019, and again had developed fluid overload with shortness of breath, lower extremity edema and weight gain    Heart rate on EKG today is controlled at 78 bpm    He ended up stopping his amiodarone at our request on 2/27 as we were planning to repeat a pulmonary vein isolation.  He has subsequently canceled that given that he knows that alcohol use is contributing to his recurrent atrial fibrillation and that is not yet under control.      Remains on Xarelto 20 mg daily for CHADSVASc of at least 3 (hypertension, age and coronary disease with scar noted on MRI).  Note that INR is higher than I would expect with Xarelto use (2.04) at the time of his cardioversion    PLAN:    At this time, we will continue rate control with metoprolol 50 mg twice daily    Agreed that we would want to get him back in sinus rhythm as he tends to do better  in this, but agreed that he will have a hard time remaining in sinus rhythm with his continued alcohol use and heart failure    Hepatic panel when he sees me next week to ensure no evidence of severe liver disease causing increase in INR    Discontinue alcohol use    Close follow-up      2.  Fluid overload    On exam, he has 2-3+ lower extremity edema, 10-12 cm of JVD and hepatojugular reflux.  His belly is tense.  He does have bibasilar crackles, right greater than left.  These are mild    He is currently taking Lasix 20 mg daily about 4-5 times per week.  He ends up not taking the sometimes given his work.  He is taking potassium 20 mEq (2 of the 10 mEq tablets)    I think he easily has 20-30 pounds of fluid on him today      He complains of dyspnea, belly fullness and edema, but has not had orthopnea or PND    PLAN:    Increase Lasix to 40 mg daily x 5 days (3/5-3/9).  Decreased to 20 mg daily starting Cuate 3/10    Increase potassium as well, increasing to 20 mEq twice daily x 5 days, decreasing to 20 mEq daily starting Cuate 3/10      Continue beta-blocker and ARB therapy    Limit sodium to less than 2300 mg daily    Cut out alcohol entirely    Elevate legs/wear compression stockings.    See me in 1 week with CMP    I am hoping that we can get him euvolemic and off of alcohol in the next 2-3 weeks, and could set up his A. fib ablation off of amiodarone in late March/early April    3.  Benign essential hypertension    Blood pressure much too high today    Remains compliant with amlodipine 2.5 mg daily, metoprolol tartrate 50 mg twice daily and losartan 100 mg daily    PLAN:    Discontinue alcohol    Increase diuretic    See me back next week    Brayden has a lot of work to get him euvolemic/off of alcohol before we can try to get him back in sinus rhythm with the best results.  He understands this, and is motivated to make some big changes over the next few weeks.  Certainly, continued sobriety will be  "important.  He does not think that he needs to attend any AA meetings, as he has \"done this before\" and \"just needs to stop.\"        Crystal Campos PA-C, MSPAS      Orders Placed This Encounter   Procedures     Basic metabolic panel     Hepatic panel     Follow-Up with Cardiac Advanced Practice Provider     EKG 12-lead complete w/read - Clinics (performed today)     Echocardiogram Complete     Orders Placed This Encounter   Medications     DISCONTD: furosemide (LASIX) 20 MG tablet     Sig: Take 20 mg by mouth daily Takes an additional tablet (20mg) PRN     potassium chloride ER (KLOR-CON) 10 MEQ CR tablet     Sig: Take 2 tablets (20 mEq) by mouth 2 times daily X 5 days, then decrease to 2 tabs daily     furosemide (LASIX) 20 MG tablet     Sig: Take 2 tablets (40 mg) by mouth daily X 5 days, then decrease to 20 mg daily     Medications Discontinued During This Encounter   Medication Reason     furosemide (LASIX) 20 MG tablet Medication Reconciliation Clean Up     KLOR-CON 10 MEQ CR tablet      furosemide (LASIX) 20 MG tablet Reorder         Encounter Diagnoses   Name Primary?     Persistent atrial fibrillation (H) Yes     Ascending aorta dilatation (H)      Benign essential hypertension      Peripheral edema        CURRENT MEDICATIONS:  Current Outpatient Medications   Medication Sig Dispense Refill     acetaminophen (TYLENOL) 325 MG tablet Take 650 mg by mouth as needed        allopurinol (ZYLOPRIM) 300 MG tablet Take 1 tablet (300 mg) by mouth daily 90 tablet 3     amLODIPine (NORVASC) 2.5 MG tablet Take 1 tablet (2.5 mg) by mouth daily 90 tablet 3     finasteride (PROPECIA) 1 MG tablet Take 1 mg by mouth daily.       furosemide (LASIX) 20 MG tablet Take 2 tablets (40 mg) by mouth daily X 5 days, then decrease to 20 mg daily       LORazepam (ATIVAN) 0.5 MG tablet Take 1-2 tablets (0.5-1 mg) by mouth every 6 hours as needed for anxiety (alcohol withdrawal) 30 tablet 0     losartan (COZAAR) 100 MG tablet Take 1 tablet " (100 mg) by mouth daily 90 tablet 3     metoprolol tartrate (LOPRESSOR) 50 MG tablet Take 1 tablet (50 mg) by mouth 2 times daily 180 tablet 3     potassium chloride ER (KLOR-CON) 10 MEQ CR tablet Take 2 tablets (20 mEq) by mouth 2 times daily X 5 days, then decrease to 2 tabs daily       XARELTO 20 MG TABS tablet take 1 tablet (20mg) by mouth daily with dinner) avoid use of alcohol while using medication. 30 tablet 11     order for DME Equipment ordered: RESMED CPAP Mask type: Full face  Settings: 15         ALLERGIES     Allergies   Allergen Reactions     No Known Drug Allergies        PAST MEDICAL HISTORY:  Past Medical History:   Diagnosis Date     Essential hypertension, benign      H/O ETOH abuse      Herpes simplex without mention of complication      Hyperlipidemia      Obesity      Persistent atrial fibrillation (H)     persistent, DCCV 2017, ablation 17     Tobacco use disorder     dced 2004       PAST SURGICAL HISTORY:  Past Surgical History:   Procedure Laterality Date     ANESTHESIA CARDIOVERSION N/A 2019    Procedure: ANESTHESIA CARDIOVERSION (PRIETO);  Surgeon: GENERIC ANESTHESIA PROVIDER;  Location:  OR     C NONSPECIFIC PROCEDURE      achilles tendon     C NONSPECIFIC PROCEDURE      knees, arthroscopy     C NONSPECIFIC PROCEDURE      basal cell skin ca, nose     C NONSPECIFIC PROCEDURE      flex sig; colonoscopy due 3/2008     C NONSPECIFIC PROCEDURE      cardioversion for atr fib     C TOTAL KNEE ARTHROPLASTY Left 2018    Dr. Malick Suarez     HERNIORRHAPHY UMBILICAL  2012    Procedure: HERNIORRHAPHY UMBILICAL;  UMBILICAL HERNIA REPAIR;  Surgeon: Ra Crocker MD;  Location: [facility]     ORTHOPEDIC SURGERY         FAMILY HISTORY:  Family History   Problem Relation Age of Onset     Family History Negative Mother      Heart Disease Father         decesased at 42 - MI     Heart Disease Sister          MI     Heart Disease Brother          MI      Heart Disease Brother         CABG AT 49     Lipids Sister      Lipids Sister      Lipids Sister      Family History Negative Brother        SOCIAL HISTORY:  Social History     Socioeconomic History     Marital status: Single     Spouse name: None     Number of children: None     Years of education: None     Highest education level: None   Occupational History     None   Social Needs     Financial resource strain: None     Food insecurity:     Worry: None     Inability: None     Transportation needs:     Medical: None     Non-medical: None   Tobacco Use     Smoking status: Former Smoker     Packs/day: 1.00     Years: 10.00     Pack years: 10.00     Types: Cigarettes     Last attempt to quit: 2011     Years since quittin.5     Smokeless tobacco: Never Used   Substance and Sexual Activity     Alcohol use: Yes     Comment: 3 shots per night     Drug use: No     Sexual activity: Yes     Partners: Female   Lifestyle     Physical activity:     Days per week: None     Minutes per session: None     Stress: None   Relationships     Social connections:     Talks on phone: None     Gets together: None     Attends [employer] service: None     Active member of club or organization: None     Attends meetings of clubs or organizations: None     Relationship status: None     Intimate partner violence:     Fear of current or ex partner: None     Emotionally abused: None     Physically abused: None     Forced sexual activity: None   Other Topics Concern     Parent/sibling w/ CABG, MI or angioplasty before 65F 55M? Not Asked   Social History Narrative     None       Review of Systems:  Skin:  Negative     Eyes:  Positive for glasses(reading only)  ENT:  Negative    Respiratory:  Positive for shortness of breath;dyspnea at rest;sleep apnea;CPAP  Cardiovascular:  palpitations;chest pain;syncope or near-syncope;cyanosis;lightheadedness;dizziness;Negative for Positive for;edema;exercise intolerance;fatigue  Gastroenterology:  "Negative    Genitourinary:  Negative    Musculoskeletal:  Negative    Neurologic:  Negative    Psychiatric:  Negative    Heme/Lymph/Imm:  Negative    Endocrine:  Negative      Physical Exam:  Vitals: BP (!) 165/108   Pulse 76   Ht 1.702 m (5' 7\")   Wt 138.9 kg (306 lb 3.2 oz)   BMI 47.96 kg/m       Constitutional:  cooperative, alert and oriented, well developed, well nourished, in no acute distress obese      Skin:  warm and dry to the touch, no apparent skin lesions or masses noted        Head:  normocephalic        Eyes:  pupils equal and round, conjunctivae and lids unremarkable, sclera white, no xanthalasma, EOMS intact, no nystagmus        ENT:  no pallor or cyanosis, dentition good        Neck:  no carotid bruit JVP >12;JVP 10-12;hepatojugular reflux      Chest:      R>L bibasilar crackles (soft)    Cardiac: no murmurs, gallops or rubs detected irregularly irregular rhythm                Abdomen:  non-tender;BS normoactive obese      Vascular: not assessed this visit                                      Extremities and Back:           Neurological:  no gross motor deficits          Recent Lab Results:    LIVER ENZYME RESULTS:  Lab Results   Component Value Date    AST 23 10/12/2017    ALT 40 10/12/2017     Component      Latest Ref Rng & Units 2/25/2019   Sodium      133 - 144 mmol/L 141   Potassium      3.4 - 5.3 mmol/L 4.1   Chloride      94 - 109 mmol/L 105   Carbon Dioxide      20 - 32 mmol/L 32   Anion Gap      3 - 14 mmol/L 4   Glucose      70 - 99 mg/dL 129 (H)   Urea Nitrogen      7 - 30 mg/dL 22   Creatinine      0.66 - 1.25 mg/dL 1.24   GFR Estimate      >60 mL/min/1.73:m2 60 (L)   GFR Estimate If Black      >60 mL/min/1.73:m2 69     Component      Latest Ref Rng & Units 2/25/2019   Cholesterol      <200 mg/dL 193   Triglycerides      <150 mg/dL 165 (H)   HDL Cholesterol      >39 mg/dL 45   LDL Cholesterol Calculated      <100 mg/dL 115 (H)   Non HDL Cholesterol      <130 mg/dL 148 (H) "     Component      Latest Ref Rng & Units 2/25/2019   Hemoglobin A1C      0 - 5.6 % 5.7 (H)     INR RESULTS:  Lab Results   Component Value Date    INR 2.04 (H) 02/26/2019    INR 1.66 (H) 11/06/2017                   Thank you for allowing me to participate in the care of your patient.      Sincerely,     Natalie Campos PA-C     [facility], [place]    cc:   No referring provider defined for this encounter.

## 2019-03-04 NOTE — LETTER
3/4/2019    Johnny Kay MD  [address] [facility] [id number]    RE: Mike Schultz       Dear Colleague,    I had the pleasure of seeing Mike BRAXTON ClarkAntoine in the [facility].    HPI:   I had the pleasure of seeing Abraham when he came for follow up of atrial fibrillation.  He is a 68 year old who sees Dr. Day for his history of:    1. Atrial Fibrillation diagnosed in 2012, treated with flecainide and DCCV with success until 12/2016. He then developed fatigue/lack of energy and fluid retention and was back in AFib 12/2016.  He underwent PVI and SVC and CTI ablation 11/2017 with good success.  Unfortunately, back in AFib 1/2019. He was placed on amiodarone with attempt x 3 at DCCV on 2/26, which were unsuccessful in restoring sinus rhythm  2. Preserved EF based on echo 10/2017 and EF 51% on cMRI 10/2017  3. Benign Essential HTN   4. BOWEN  5. Myocardiac Scar noted on cardiac MRI 10/2017, consistent with prior MI in the left Cx territory. EF 51%  6. Alcohol use (5-6 shots/day)    When Dr. Day last saw Brayden 2/11/19, he noted increasing shortness of breath and weight gain.  He had been back in atrial fibrillation for about a month, and Dr. Day noted that he typically did much better in sinus rhythm.  Therefore, he initiated amiodarone therapy (loading dose 400 mg twice daily x 1 week, then 200 mg daily) and had him undergo DC cardioversion on 2/26.  As above, 3 attempts were made at DC cardioversion at 200 J and were not successful in restoring sinus rhythm.  Dr. Day recommended he meet with me to discuss repeat pulmonary vein isolation, potentially focusing on non-PV trigger such as the left atrial posterior wall.    Brayden tells me that he continues to feel very short of breath and has noted increasing weight.  He feels like he is gaining fluid weight in his legs and belly.  He denies orthopnea or PND, and remains compliant with CPAP therapy.  He denies any problems with chest  pain, pressure or tightness.    He denies palpitations, lightheadedness or dizziness.    Unfortunately, he admits to drinking quite a bit more than he had back when he saw Dr. Day in 6/2018.  He typically had 1-2 drinks once a week, but is now having 5-6 mini bottles (likely 1-1.5 ounces) of alcohol per night.    EKG today, which I overread, showed continued atrial fibrillation with occasional PVCs at 78 bpm  Echocardiogram 10/2017 showed normal ejection fraction 55-60% with mild left ventricular hypertrophy.  Mild to moderate/moderate aortic stenosis with a mean gradient of 11.  Aortic valve area was 1.4 cm .  1+ mitral regurgitation.  Ascending aorta was mildly dilated at 4.2 cm above the sinotubular ridge.  Cardiac MRI 10/2017 showed an EF of 51% with mild hypokinesis involving the basal inferolateral segment.  Evidence of previous myocardial infarction in the circumflex territory was noted.    Assessment & Plan:    1.  Persistent atrial fibrillation, now in the setting of heart failure    As above, underwent pulmonary vein isolation 11/2017.  He was in atrial fibrillation again 1/2019, and again had developed fluid overload with shortness of breath, lower extremity edema and weight gain    Heart rate on EKG today is controlled at 78 bpm    He ended up stopping his amiodarone at our request on 2/27 as we were planning to repeat a pulmonary vein isolation.  He has subsequently canceled that given that he knows that alcohol use is contributing to his recurrent atrial fibrillation and that is not yet under control.      Remains on Xarelto 20 mg daily for CHADSVASc of at least 3 (hypertension, age and coronary disease with scar noted on MRI).  Note that INR is higher than I would expect with Xarelto use (2.04) at the time of his cardioversion    PLAN:    At this time, we will continue rate control with metoprolol 50 mg twice daily    Agreed that we would want to get him back in sinus rhythm as he tends to do better  in this, but agreed that he will have a hard time remaining in sinus rhythm with his continued alcohol use and heart failure    Hepatic panel when he sees me next week to ensure no evidence of severe liver disease causing increase in INR    Discontinue alcohol use    Close follow-up      2.  Fluid overload    On exam, he has 2-3+ lower extremity edema, 10-12 cm of JVD and hepatojugular reflux.  His belly is tense.  He does have bibasilar crackles, right greater than left.  These are mild    He is currently taking Lasix 20 mg daily about 4-5 times per week.  He ends up not taking the sometimes given his work.  He is taking potassium 20 mEq (2 of the 10 mEq tablets)    I think he easily has 20-30 pounds of fluid on him today      He complains of dyspnea, belly fullness and edema, but has not had orthopnea or PND    PLAN:    Increase Lasix to 40 mg daily x 5 days (3/5-3/9).  Decreased to 20 mg daily starting Cuate 3/10    Increase potassium as well, increasing to 20 mEq twice daily x 5 days, decreasing to 20 mEq daily starting Cuate 3/10      Continue beta-blocker and ARB therapy    Limit sodium to less than 2300 mg daily    Cut out alcohol entirely    Elevate legs/wear compression stockings.    See me in 1 week with CMP    I am hoping that we can get him euvolemic and off of alcohol in the next 2-3 weeks, and could set up his A. fib ablation off of amiodarone in late March/early April    3.  Benign essential hypertension    Blood pressure much too high today    Remains compliant with amlodipine 2.5 mg daily, metoprolol tartrate 50 mg twice daily and losartan 100 mg daily    PLAN:    Discontinue alcohol    Increase diuretic    See me back next week    Brayden has a lot of work to get him euvolemic/off of alcohol before we can try to get him back in sinus rhythm with the best results.  He understands this, and is motivated to make some big changes over the next few weeks.  Certainly, continued sobriety will be  "important.  He does not think that he needs to attend any AA meetings, as he has \"done this before\" and \"just needs to stop.\"        Crystal Campos PA-C, MSPAS      Orders Placed This Encounter   Procedures     Basic metabolic panel     Hepatic panel     Follow-Up with Cardiac Advanced Practice Provider     EKG 12-lead complete w/read - Clinics (performed today)     Echocardiogram Complete     Orders Placed This Encounter   Medications     DISCONTD: furosemide (LASIX) 20 MG tablet     Sig: Take 20 mg by mouth daily Takes an additional tablet (20mg) PRN     potassium chloride ER (KLOR-CON) 10 MEQ CR tablet     Sig: Take 2 tablets (20 mEq) by mouth 2 times daily X 5 days, then decrease to 2 tabs daily     furosemide (LASIX) 20 MG tablet     Sig: Take 2 tablets (40 mg) by mouth daily X 5 days, then decrease to 20 mg daily     Medications Discontinued During This Encounter   Medication Reason     furosemide (LASIX) 20 MG tablet Medication Reconciliation Clean Up     KLOR-CON 10 MEQ CR tablet      furosemide (LASIX) 20 MG tablet Reorder         Encounter Diagnoses   Name Primary?     Persistent atrial fibrillation (H) Yes     Ascending aorta dilatation (H)      Benign essential hypertension      Peripheral edema        CURRENT MEDICATIONS:  Current Outpatient Medications   Medication Sig Dispense Refill     acetaminophen (TYLENOL) 325 MG tablet Take 650 mg by mouth as needed        allopurinol (ZYLOPRIM) 300 MG tablet Take 1 tablet (300 mg) by mouth daily 90 tablet 3     amLODIPine (NORVASC) 2.5 MG tablet Take 1 tablet (2.5 mg) by mouth daily 90 tablet 3     finasteride (PROPECIA) 1 MG tablet Take 1 mg by mouth daily.       furosemide (LASIX) 20 MG tablet Take 2 tablets (40 mg) by mouth daily X 5 days, then decrease to 20 mg daily       LORazepam (ATIVAN) 0.5 MG tablet Take 1-2 tablets (0.5-1 mg) by mouth every 6 hours as needed for anxiety (alcohol withdrawal) 30 tablet 0     losartan (COZAAR) 100 MG tablet Take 1 tablet " (100 mg) by mouth daily 90 tablet 3     metoprolol tartrate (LOPRESSOR) 50 MG tablet Take 1 tablet (50 mg) by mouth 2 times daily 180 tablet 3     potassium chloride ER (KLOR-CON) 10 MEQ CR tablet Take 2 tablets (20 mEq) by mouth 2 times daily X 5 days, then decrease to 2 tabs daily       XARELTO 20 MG TABS tablet take 1 tablet (20mg) by mouth daily with dinner) avoid use of alcohol while using medication. 30 tablet 11     order for DME Equipment ordered: RESMED CPAP Mask type: Full face  Settings: 15         ALLERGIES     Allergies   Allergen Reactions     No Known Drug Allergies        PAST MEDICAL HISTORY:  Past Medical History:   Diagnosis Date     Essential hypertension, benign      H/O ETOH abuse      Herpes simplex without mention of complication      Hyperlipidemia      Obesity      Persistent atrial fibrillation (H)     persistent, DCCV 2017, ablation 17     Tobacco use disorder     dced 2004       PAST SURGICAL HISTORY:  Past Surgical History:   Procedure Laterality Date     ANESTHESIA CARDIOVERSION N/A 2019    Procedure: ANESTHESIA CARDIOVERSION (PRIETO);  Surgeon: GENERIC ANESTHESIA PROVIDER;  Location:  OR     C NONSPECIFIC PROCEDURE      achilles tendon     C NONSPECIFIC PROCEDURE      knees, arthroscopy     C NONSPECIFIC PROCEDURE      basal cell skin ca, nose     C NONSPECIFIC PROCEDURE      flex sig; colonoscopy due 3/2008     C NONSPECIFIC PROCEDURE      cardioversion for atr fib     C TOTAL KNEE ARTHROPLASTY Left 2018    Dr. Malick Suarez     HERNIORRHAPHY UMBILICAL  2012    Procedure: HERNIORRHAPHY UMBILICAL;  UMBILICAL HERNIA REPAIR;  Surgeon: Ra Crocker MD;  Location: [facility]     ORTHOPEDIC SURGERY         FAMILY HISTORY:  Family History   Problem Relation Age of Onset     Family History Negative Mother      Heart Disease Father         decesased at 42 - MI     Heart Disease Sister          MI     Heart Disease Brother          MI      Heart Disease Brother         CABG AT 49     Lipids Sister      Lipids Sister      Lipids Sister      Family History Negative Brother        SOCIAL HISTORY:  Social History     Socioeconomic History     Marital status: Single     Spouse name: None     Number of children: None     Years of education: None     Highest education level: None   Occupational History     None   Social Needs     Financial resource strain: None     Food insecurity:     Worry: None     Inability: None     Transportation needs:     Medical: None     Non-medical: None   Tobacco Use     Smoking status: Former Smoker     Packs/day: 1.00     Years: 10.00     Pack years: 10.00     Types: Cigarettes     Last attempt to quit: 2011     Years since quittin.5     Smokeless tobacco: Never Used   Substance and Sexual Activity     Alcohol use: Yes     Comment: 3 shots per night     Drug use: No     Sexual activity: Yes     Partners: Female   Lifestyle     Physical activity:     Days per week: None     Minutes per session: None     Stress: None   Relationships     Social connections:     Talks on phone: None     Gets together: None     Attends Oriental orthodox service: None     Active member of club or organization: None     Attends meetings of clubs or organizations: None     Relationship status: None     Intimate partner violence:     Fear of current or ex partner: None     Emotionally abused: None     Physically abused: None     Forced sexual activity: None   Other Topics Concern     Parent/sibling w/ CABG, MI or angioplasty before 65F 55M? Not Asked   Social History Narrative     None       Review of Systems:  Skin:  Negative     Eyes:  Positive for glasses(reading only)  ENT:  Negative    Respiratory:  Positive for shortness of breath;dyspnea at rest;sleep apnea;CPAP  Cardiovascular:  palpitations;chest pain;syncope or near-syncope;cyanosis;lightheadedness;dizziness;Negative for Positive for;edema;exercise intolerance;fatigue  Gastroenterology:  "Negative    Genitourinary:  Negative    Musculoskeletal:  Negative    Neurologic:  Negative    Psychiatric:  Negative    Heme/Lymph/Imm:  Negative    Endocrine:  Negative      Physical Exam:  Vitals: BP (!) 165/108   Pulse 76   Ht 1.702 m (5' 7\")   Wt 138.9 kg (306 lb 3.2 oz)   BMI 47.96 kg/m       Constitutional:  cooperative, alert and oriented, well developed, well nourished, in no acute distress obese      Skin:  warm and dry to the touch, no apparent skin lesions or masses noted        Head:  normocephalic        Eyes:  pupils equal and round, conjunctivae and lids unremarkable, sclera white, no xanthalasma, EOMS intact, no nystagmus        ENT:  no pallor or cyanosis, dentition good        Neck:  no carotid bruit JVP >12;JVP 10-12;hepatojugular reflux      Chest:      R>L bibasilar crackles (soft)    Cardiac: no murmurs, gallops or rubs detected irregularly irregular rhythm                Abdomen:  non-tender;BS normoactive obese      Vascular: not assessed this visit                                      Extremities and Back:           Neurological:  no gross motor deficits          Recent Lab Results:    LIVER ENZYME RESULTS:  Lab Results   Component Value Date    AST 23 10/12/2017    ALT 40 10/12/2017     Component      Latest Ref Rng & Units 2/25/2019   Sodium      133 - 144 mmol/L 141   Potassium      3.4 - 5.3 mmol/L 4.1   Chloride      94 - 109 mmol/L 105   Carbon Dioxide      20 - 32 mmol/L 32   Anion Gap      3 - 14 mmol/L 4   Glucose      70 - 99 mg/dL 129 (H)   Urea Nitrogen      7 - 30 mg/dL 22   Creatinine      0.66 - 1.25 mg/dL 1.24   GFR Estimate      >60 mL/min/1.73:m2 60 (L)   GFR Estimate If Black      >60 mL/min/1.73:m2 69     Component      Latest Ref Rng & Units 2/25/2019   Cholesterol      <200 mg/dL 193   Triglycerides      <150 mg/dL 165 (H)   HDL Cholesterol      >39 mg/dL 45   LDL Cholesterol Calculated      <100 mg/dL 115 (H)   Non HDL Cholesterol      <130 mg/dL 148 (H) "     Component      Latest Ref Rng & Units 2/25/2019   Hemoglobin A1C      0 - 5.6 % 5.7 (H)     INR RESULTS:  Lab Results   Component Value Date    INR 2.04 (H) 02/26/2019    INR 1.66 (H) 11/06/2017         Thank you for allowing me to participate in the care of your patient.    Sincerely,     Natalie Campos PA-C     [facility]

## 2019-03-12 ENCOUNTER — HOSPITAL ENCOUNTER (OUTPATIENT)
Dept: CARDIOLOGY | Facility: CLINIC | Age: 69
Discharge: HOME | End: 2019-03-12
Attending: PHYSICIAN ASSISTANT | Admitting: PHYSICIAN ASSISTANT
Payer: COMMERCIAL

## 2019-03-12 ENCOUNTER — OFFICE VISIT (OUTPATIENT)
Dept: CARDIOLOGY | Facility: CLINIC | Age: 69
End: 2019-03-12
Payer: COMMERCIAL

## 2019-03-12 VITALS
SYSTOLIC BLOOD PRESSURE: 128 MMHG | HEIGHT: 67 IN | HEART RATE: 68 BPM | BODY MASS INDEX: 46.74 KG/M2 | WEIGHT: 297.8 LBS | DIASTOLIC BLOOD PRESSURE: 88 MMHG

## 2019-03-12 DIAGNOSIS — I48.19 PERSISTENT ATRIAL FIBRILLATION (H): ICD-10-CM

## 2019-03-12 DIAGNOSIS — R60.0 PERIPHERAL EDEMA: ICD-10-CM

## 2019-03-12 DIAGNOSIS — I10 BENIGN ESSENTIAL HYPERTENSION: ICD-10-CM

## 2019-03-12 LAB
ALBUMIN SERPL-MCNC: 3.7 G/DL (ref 3.4–5)
ALP SERPL-CCNC: 82 U/L (ref 40–150)
ALT SERPL W P-5'-P-CCNC: 40 U/L (ref 0–70)
ANION GAP SERPL CALCULATED.3IONS-SCNC: 12 MMOL/L (ref 6–17)
AST SERPL W P-5'-P-CCNC: 22 U/L (ref 0–45)
BILIRUB DIRECT SERPL-MCNC: 0.2 MG/DL (ref 0–0.2)
BILIRUB SERPL-MCNC: 0.7 MG/DL (ref 0.2–1.3)
BUN SERPL-MCNC: 21 MG/DL (ref 7–30)
CALCIUM SERPL-MCNC: 9.1 MG/DL (ref 8.5–10.5)
CHLORIDE SERPL-SCNC: 105 MMOL/L (ref 98–107)
CO2 SERPL-SCNC: 28 MMOL/L (ref 23–29)
CREAT SERPL-MCNC: 1.24 MG/DL (ref 0.7–1.3)
GFR SERPL CREATININE-BSD FRML MDRD: 58 ML/MIN/{1.73_M2}
GLUCOSE SERPL-MCNC: 100 MG/DL (ref 70–105)
POTASSIUM SERPL-SCNC: 4 MMOL/L (ref 3.5–5.1)
PROT SERPL-MCNC: 7.4 G/DL (ref 6.8–8.8)
SODIUM SERPL-SCNC: 141 MMOL/L (ref 136–145)

## 2019-03-12 PROCEDURE — 99214 OFFICE O/P EST MOD 30 MIN: CPT | Performed by: PHYSICIAN ASSISTANT

## 2019-03-12 PROCEDURE — 25500064 ZZH RX 255 OP 636: Performed by: PHYSICIAN ASSISTANT

## 2019-03-12 PROCEDURE — 80048 BASIC METABOLIC PNL TOTAL CA: CPT | Performed by: PHYSICIAN ASSISTANT

## 2019-03-12 PROCEDURE — 40000264 ECHOCARDIOGRAM COMPLETE

## 2019-03-12 PROCEDURE — 93306 TTE W/DOPPLER COMPLETE: CPT | Mod: 26 | Performed by: INTERNAL MEDICINE

## 2019-03-12 PROCEDURE — 80076 HEPATIC FUNCTION PANEL: CPT | Performed by: PHYSICIAN ASSISTANT

## 2019-03-12 PROCEDURE — 36415 COLL VENOUS BLD VENIPUNCTURE: CPT | Performed by: PHYSICIAN ASSISTANT

## 2019-03-12 RX ORDER — POTASSIUM CHLORIDE 750 MG/1
20 TABLET, EXTENDED RELEASE ORAL DAILY
Start: 2019-03-12 | End: 2019-03-28

## 2019-03-12 RX ORDER — FUROSEMIDE 20 MG
40 TABLET ORAL DAILY
Start: 2019-03-12 | End: 2019-07-08

## 2019-03-12 RX ORDER — AMIODARONE HYDROCHLORIDE 200 MG/1
200 TABLET ORAL DAILY
COMMUNITY
End: 2019-03-12

## 2019-03-12 RX ADMIN — HUMAN ALBUMIN MICROSPHERES AND PERFLUTREN 9 ML: 10; .22 INJECTION, SOLUTION INTRAVENOUS at 13:26

## 2019-03-12 SDOH — HEALTH STABILITY: MENTAL HEALTH: HOW OFTEN DO YOU HAVE A DRINK CONTAINING ALCOHOL?: NEVER

## 2019-03-12 ASSESSMENT — MIFFLIN-ST. JEOR: SCORE: 2084.44

## 2019-03-12 NOTE — LETTER
3/12/2019    Johnny Kay MD  [address] [facility] [address]    RE: Mike Schultz       Dear Colleague,    I had the pleasure of seeing Mike BRAXTON Schultz in the [facility].    HPI:   I had the pleasure of seeing Brayden when he came for follow up of atrial fibrillation.  He is a 68 year old who sees Dr. Day for his history of:     1. Atrial Fibrillation diagnosed in 2012, treated with flecainide and DCCV with success until 12/2016. He then developed fatigue/lack of energy and fluid retention and was back in AFib 12/2016.  He underwent PVI and SVC and CTI ablation 11/2017 with good success.  Unfortunately, back in AFib 1/2019. He was placed on amiodarone with attempt x 3 at DCCV on 2/26, which were unsuccessful in restoring sinus rhythm. We are currently working on a short-term rate control strategy while he gets his risk factors under control, with plans for rhythm control ASAP  2. Preserved EF based on echo 10/2017 and EF 51% on cMRI 10/2017  3. Benign Essential HTN   4. BOWEN  5. Myocardiac Scar noted on cardiac MRI 10/2017, consistent with prior MI in the left Cx territory. EF 51%  6. Alcohol use (5-6 mini-bottles alcohol/day) -  Roughly 2/3 pint daily    When I last saw Brayden we discussed his unsuccessful atrial fibrillation ablation despite the use of amiodarone therapy.  I plan to discuss repeat pulmonary vein isolation, potentially focusing on non-PV triggers, such as the left atrial posterior wall, but it came out that he was drinking quite a bit of alcohol (5-6 mini bottles of alcohol, each roughly 1.7 fluid ounces) daily.  He also showed evidence of heart failure, and I recommend to discontinue drinking, continued rate control with Toprol 50 mg twice daily (for now) and increased his Lasix to 40 mg for 5 days, then decreasing to 20 mg daily.  I also increased his potassium supplement. At that time, we thought he was off of Amiodarone as instructed since 2/27.    It  "turns out that he is actually been taking amiodarone 200 mg daily.    Overall, he is already feeling better.  He has not had alcohol since our last visit 3/4.  He does admit that the first 4 days were difficult, and admits that he actually probably was having up to 10 bottles per day (1 pint) at times.    He diuresed dramatically, and he states his legs feel \"less heavy.\"  They do not yet feel like normal.  He has not had orthopnea or PND.  He feels his dyspnea is improved.  He denies any problems of chest pain, pressure or tightness.  Denies palpitations.  He does continue to note fatigue and shortness of breath, which have been his symptoms of atrial fibrillation in the past.    He is compliant with Xarelto, and has not missed any doses whatsoever.    Echocardiogram today showed EF of 55-60%.  No regional wall motion abnormalities were noted.  He had 1+ mitral regurgitation and 1+ tricuspid regurgitation.  RV was normal in structure, function and size.  Aortic valve showed just trace to mild aortic regurgitation with aortic sclerosis.  Mean gradient was 4.6 mmHg (improved from previous) volume index is 46.7 mL/m  with LA dimension of 4.9 cm.  Component      Latest Ref Rng & Units 3/12/2019   Bilirubin Direct      0.0 - 0.2 mg/dL 0.2   Bilirubin Total      0.2 - 1.3 mg/dL 0.7   Albumin      3.4 - 5.0 g/dL 3.7   Protein Total      6.8 - 8.8 g/dL 7.4   Alkaline Phosphatase      40 - 150 U/L 82   ALT      0 - 70 U/L 40   AST      0 - 45 U/L 22     Component      Latest Ref Rng & Units 2/25/2019 3/12/2019   Sodium      136 - 145 mmol/L 141 141   Potassium      3.5 - 5.1 mmol/L 4.1 4.0   Chloride      98 - 107 mmol/L 105 105   Carbon Dioxide      23 - 29 mmol/L 32 28   Anion Gap      6 - 17 mmol/L 4 12   Glucose      70 - 105 mg/dL 129 (H) 100   Urea Nitrogen      7 - 30 mg/dL 22 21   Creatinine      0.70 - 1.30 mg/dL 1.24 1.24   GFR Estimate      >60 mL/min/1.73:m2 60 (L) 58 (L)   GFR Estimate If Black      >60 " mL/min/1.73:m2 69 70   Calcium      8.5 - 10.5 mg/dL 8.4 (L) 9.1     Echocardiogram 10/2017 showed normal ejection fraction 55-60% with mild left ventricular hypertrophy.  Mild to moderate/moderate aortic stenosis with a mean gradient of 11.  Aortic valve area was 1.4 cm .  1+ mitral regurgitation.  Ascending aorta was mildly dilated at 4.2 cm above the sinotubular ridge.    Cardiac MRI 10/2017 showed an EF of 51% with mild hypokinesis involving the basal inferolateral segment.  Evidence of previous myocardial infarction in the circumflex territory was noted.    Assessment & Plan:    1.  Persistent atrial fibrillation, now with heart failure    As above, underwent pulmonary vein isolation 11/2017.  He was back in A. fib as of 1/2019 and had to be developed fluid overload with shortness of breath, edema and weight gain.    It turns out that he did not stop amiodarone 2/27 after his unsuccessful cardioversion    Remains on Xarelto    PLAN:    Continue metoprolol tartrate 50 mg twice daily    Discontinue amiodarone therapy for now, but understands he may need to restart following repeat ablation    Continue Xarelto    Plan repeat pulmonary vein isolation last week of March or early April.    Continue to avoid alcohol      2.  Heart failure with preserved ejection fraction    Likely exacerbated by recurrent atrial fibrillation and alcohol use    Echocardiogram, as above, continues to show normal ejection fraction and normal right ventricular function/structure    He has been on Lasix 40 mg and potassium 20 mEq daily, with good results, including 10 pound weight loss and decreased edema.  Exam today without bibasilar crackles, but still has edema, JVD and hepatojugular reflux    PLAN:    Continue to avoid alcohol    Continue Lasix 40 mg daily and potassium chloride 20 mEq daily    See me in 1 week with a BMP, at which time I hope he is euvolemic    3.  Benign essential hypertension     Continues losartan, furosemide and  metoprolol    Blood pressure today looks wonderful, likely a combination of diuresis, medications and the fact that he has quit drinking    PLAN:    No changes to medications    Crystal Campos PA-C, MSPAS      Orders Placed This Encounter   Procedures     Basic metabolic panel     Follow-Up with Cardiac Advanced Practice Provider     EKG 12-lead complete w/read - Clinics (to be scheduled)     Orders Placed This Encounter   Medications     DISCONTD: amiodarone (PACERONE/CODARONE) 200 MG tablet     Sig: Take 200 mg by mouth daily     furosemide (LASIX) 20 MG tablet     Sig: Take 2 tablets (40 mg) by mouth daily     potassium chloride ER (KLOR-CON) 10 MEQ CR tablet     Sig: Take 2 tablets (20 mEq) by mouth daily     Medications Discontinued During This Encounter   Medication Reason     acetaminophen (TYLENOL) 325 MG tablet Stopped by Patient     amiodarone (PACERONE/CODARONE) 200 MG tablet      furosemide (LASIX) 20 MG tablet      potassium chloride ER (KLOR-CON) 10 MEQ CR tablet          Encounter Diagnoses   Name Primary?     Persistent atrial fibrillation (H)      Benign essential hypertension      Peripheral edema        CURRENT MEDICATIONS:  Current Outpatient Medications   Medication Sig Dispense Refill     allopurinol (ZYLOPRIM) 300 MG tablet Take 1 tablet (300 mg) by mouth daily 90 tablet 3     amLODIPine (NORVASC) 2.5 MG tablet Take 1 tablet (2.5 mg) by mouth daily 90 tablet 3     finasteride (PROPECIA) 1 MG tablet Take 1 mg by mouth daily.       furosemide (LASIX) 20 MG tablet Take 2 tablets (40 mg) by mouth daily       LORazepam (ATIVAN) 0.5 MG tablet Take 1-2 tablets (0.5-1 mg) by mouth every 6 hours as needed for anxiety (alcohol withdrawal) 30 tablet 0     losartan (COZAAR) 100 MG tablet Take 1 tablet (100 mg) by mouth daily 90 tablet 3     metoprolol tartrate (LOPRESSOR) 50 MG tablet Take 1 tablet (50 mg) by mouth 2 times daily 180 tablet 3     order for DME Equipment ordered: RESMED CPAP Mask type: Full  face  Settings: 15       potassium chloride ER (KLOR-CON) 10 MEQ CR tablet Take 2 tablets (20 mEq) by mouth daily       XARELTO 20 MG TABS tablet take 1 tablet (20mg) by mouth daily with dinner) avoid use of alcohol while using medication. 30 tablet 11       ALLERGIES     Allergies   Allergen Reactions     No Known Drug Allergies        PAST MEDICAL HISTORY:  Past Medical History:   Diagnosis Date     Essential hypertension, benign      H/O ETOH abuse      Herpes simplex without mention of complication      Hyperlipidemia      Obesity      Persistent atrial fibrillation (H)     persistent, DCCV 2017, ablation 17     Tobacco use disorder     dced 2004       PAST SURGICAL HISTORY:  Past Surgical History:   Procedure Laterality Date     ANESTHESIA CARDIOVERSION N/A 2019    Procedure: ANESTHESIA CARDIOVERSION (PRIETO);  Surgeon: GENERIC ANESTHESIA PROVIDER;  Location:  OR      NONSPECIFIC PROCEDURE      achilles tendon     C NONSPECIFIC PROCEDURE      knees, arthroscopy     C NONSPECIFIC PROCEDURE      basal cell skin ca, nose     C NONSPECIFIC PROCEDURE      flex sig; colonoscopy due 3/2008     C NONSPECIFIC PROCEDURE      cardioversion for atr fib     C TOTAL KNEE ARTHROPLASTY Left 2018    Dr. Malick Suarez     HERNIORRHAPHY UMBILICAL  2012    Procedure: HERNIORRHAPHY UMBILICAL;  UMBILICAL HERNIA REPAIR;  Surgeon: Ra Crocker MD;  Location: [facility]     ORTHOPEDIC SURGERY         FAMILY HISTORY:  Family History   Problem Relation Age of Onset     Family History Negative Mother      Heart Disease Father         decesased at 42 - MI     Heart Disease Sister          MI     Heart Disease Brother          MI     Heart Disease Brother         CABG AT 49     Lipids Sister      Lipids Sister      Lipids Sister      Family History Negative Brother        SOCIAL HISTORY:  Social History     Socioeconomic History     Marital status: Single     Spouse name: None     Number  of children: None     Years of education: None     Highest education level: None   Occupational History     None   Social Needs     Financial resource strain: None     Food insecurity:     Worry: None     Inability: None     Transportation needs:     Medical: None     Non-medical: None   Tobacco Use     Smoking status: Former Smoker     Packs/day: 1.00     Years: 10.00     Pack years: 10.00     Types: Cigarettes     Start date:      Last attempt to quit: 2011     Years since quittin.5     Smokeless tobacco: Never Used   Substance and Sexual Activity     Alcohol use: No     Frequency: Never     Drug use: No     Sexual activity: Yes     Partners: Female   Lifestyle     Physical activity:     Days per week: None     Minutes per session: None     Stress: None   Relationships     Social connections:     Talks on phone: None     Gets together: None     Attends [employer] service: None     Active member of club or organization: None     Attends meetings of clubs or organizations: None     Relationship status: None     Intimate partner violence:     Fear of current or ex partner: None     Emotionally abused: None     Physically abused: None     Forced sexual activity: None   Other Topics Concern     Parent/sibling w/ CABG, MI or angioplasty before 65F 55M? Not Asked   Social History Narrative     None       Review of Systems:  Skin:  Negative     Eyes:  Positive for glasses(reading only)  ENT:  Negative    Respiratory:  Positive for sleep apnea;CPAP;dyspnea on exertion  Cardiovascular:  palpitations;chest pain;syncope or near-syncope;cyanosis;lightheadedness;dizziness;Negative for Positive for;edema;exercise intolerance;fatigue  Gastroenterology: Negative for hematochezia;melena  Genitourinary:  Positive for urinary frequency  Musculoskeletal:  Negative    Neurologic:  Negative    Psychiatric:  Negative    Heme/Lymph/Imm:  Negative    Endocrine:  Negative      Physical Exam:  Vitals: /88   Pulse 68   Ht  "1.702 m (5' 7\")   Wt 135.1 kg (297 lb 12.8 oz)   BMI 46.64 kg/m       Constitutional:  cooperative, alert and oriented, well developed, well nourished, in no acute distress obese      Skin:  warm and dry to the touch, no apparent skin lesions or masses noted        Head:  normocephalic        Eyes:  pupils equal and round;conjunctivae and lids unremarkable;sclera white        ENT:  no pallor or cyanosis, dentition good        Neck:  no carotid bruit JVP 10-12;hepatojugular reflux      Chest:  normal breath sounds, clear to auscultation, normal A-P diameter, normal symmetry, normal respiratory excursion, no use of accessory muscles        Cardiac: no murmurs, gallops or rubs detected irregularly irregular rhythm                Abdomen:  non-tender;BS normoactive obese      Vascular: not assessed this visit                                      Extremities and Back:      improved    Neurological:  no gross motor deficits          Recent Lab Results:  LIPID RESULTS:  Lab Results   Component Value Date    CHOL 193 02/25/2019    HDL 45 02/25/2019     (H) 02/25/2019    TRIG 165 (H) 02/25/2019    CHOLHDLRATIO 3.8 03/16/2015       LIVER ENZYME RESULTS:  Lab Results   Component Value Date    AST 22 03/12/2019    ALT 40 03/12/2019       CBC RESULTS:  Lab Results   Component Value Date    WBC 10.5 11/06/2017    RBC 4.55 11/06/2017    HGB 16.2 11/06/2017    HCT 46.9 11/06/2017     (H) 11/06/2017    MCH 35.6 (H) 11/06/2017    MCHC 34.5 11/06/2017    RDW 13.3 11/06/2017     11/06/2017       BMP RESULTS:  Lab Results   Component Value Date     03/12/2019    POTASSIUM 4.0 03/12/2019    CHLORIDE 105 03/12/2019    CO2 28 03/12/2019    ANIONGAP 12 03/12/2019     03/12/2019    BUN 21 03/12/2019    CR 1.24 03/12/2019    GFRESTIMATED 58 (L) 03/12/2019    GFRESTBLACK 70 03/12/2019    ASHELY 9.1 03/12/2019        A1C RESULTS:  Lab Results   Component Value Date    A1C 5.7 (H) 02/25/2019       INR " RESULTS:  Lab Results   Component Value Date    INR 2.04 (H) 02/26/2019    INR 1.66 (H) 11/06/2017           CC  Johnny Kay MD  [address] [place] [id number]                    Thank you for allowing me to participate in the care of your patient.      Sincerely,     Natalie Campos PA-C     [facility]    cc:   Johnny Kay MD  [address] [place] [phone]

## 2019-03-12 NOTE — PROGRESS NOTES
HPI:   I had the pleasure of seeing Brayden when he came for follow up of atrial fibrillation.  He is a 68 year old who sees Dr. Day for his history of:     1. Atrial Fibrillation diagnosed in 2012, treated with flecainide and DCCV with success until 12/2016. He then developed fatigue/lack of energy and fluid retention and was back in AFib 12/2016.  He underwent PVI and SVC and CTI ablation 11/2017 with good success.  Unfortunately, back in AFib 1/2019. He was placed on amiodarone with attempt x 3 at [facility] on 2/26, which were unsuccessful in restoring sinus rhythm. We are currently working on a short-term rate control strategy while he gets his risk factors under control, with plans for rhythm control ASAP  2. Preserved EF based on echo 10/2017 and EF 51% on cMRI 10/2017  3. Benign Essential HTN   4. BOWEN  5. Myocardiac Scar noted on cardiac MRI 10/2017, consistent with prior MI in the left Cx territory. EF 51%  6. Alcohol use (5-6 mini-bottles alcohol/day) -  Roughly 2/3 pint daily    When I last saw Brayden we discussed his unsuccessful atrial fibrillation ablation despite the use of amiodarone therapy.  I plan to discuss repeat pulmonary vein isolation, potentially focusing on non-PV triggers, such as the left atrial posterior wall, but it came out that he was drinking quite a bit of alcohol (5-6 mini bottles of alcohol, each roughly 1.7 fluid ounces) daily.  He also showed evidence of heart failure, and I recommend to discontinue drinking, continued rate control with Toprol 50 mg twice daily (for now) and increased his Lasix to 40 mg for 5 days, then decreasing to 20 mg daily.  I also increased his potassium supplement. At that time, we thought he was off of Amiodarone as instructed since 2/27.    It turns out that he is actually been taking amiodarone 200 mg daily.    Overall, he is already feeling better.  He has not had alcohol since our last visit 3/4.  He does admit that the first 4 days were difficult, and  "admits that he actually probably was having up to 10 bottles per day (1 pint) at times.    He diuresed dramatically, and he states his legs feel \"less heavy.\"  They do not yet feel like normal.  He has not had orthopnea or PND.  He feels his dyspnea is improved.  He denies any problems of chest pain, pressure or tightness.  Denies palpitations.  He does continue to note fatigue and shortness of breath, which have been his symptoms of atrial fibrillation in the past.    He is compliant with Xarelto, and has not missed any doses whatsoever.    Echocardiogram today showed EF of 55-60%.  No regional wall motion abnormalities were noted.  He had 1+ mitral regurgitation and 1+ tricuspid regurgitation.  RV was normal in structure, function and size.  Aortic valve showed just trace to mild aortic regurgitation with aortic sclerosis.  Mean gradient was 4.6 mmHg (improved from previous) volume index is 46.7 mL/m  with LA dimension of 4.9 cm.  Component      Latest Ref Rng & Units 3/12/2019   Bilirubin Direct      0.0 - 0.2 mg/dL 0.2   Bilirubin Total      0.2 - 1.3 mg/dL 0.7   Albumin      3.4 - 5.0 g/dL 3.7   Protein Total      6.8 - 8.8 g/dL 7.4   Alkaline Phosphatase      40 - 150 U/L 82   ALT      0 - 70 U/L 40   AST      0 - 45 U/L 22     Component      Latest Ref Rng & Units 2/25/2019 3/12/2019   Sodium      136 - 145 mmol/L 141 141   Potassium      3.5 - 5.1 mmol/L 4.1 4.0   Chloride      98 - 107 mmol/L 105 105   Carbon Dioxide      23 - 29 mmol/L 32 28   Anion Gap      6 - 17 mmol/L 4 12   Glucose      70 - 105 mg/dL 129 (H) 100   Urea Nitrogen      7 - 30 mg/dL 22 21   Creatinine      0.70 - 1.30 mg/dL 1.24 1.24   GFR Estimate      >60 mL/min/1.73:m2 60 (L) 58 (L)   GFR Estimate If Black      >60 mL/min/1.73:m2 69 70   Calcium      8.5 - 10.5 mg/dL 8.4 (L) 9.1     Echocardiogram 10/2017 showed normal ejection fraction 55-60% with mild left ventricular hypertrophy.  Mild to moderate/moderate aortic stenosis with a " mean gradient of 11.  Aortic valve area was 1.4 cm .  1+ mitral regurgitation.  Ascending aorta was mildly dilated at 4.2 cm above the sinotubular ridge.    Cardiac MRI 10/2017 showed an EF of 51% with mild hypokinesis involving the basal inferolateral segment.  Evidence of previous myocardial infarction in the circumflex territory was noted.    Assessment & Plan:    1.  Persistent atrial fibrillation, now with heart failure    As above, underwent pulmonary vein isolation 11/2017.  He was back in A. fib as of 1/2019 and had to be developed fluid overload with shortness of breath, edema and weight gain.    It turns out that he did not stop amiodarone 2/27 after his unsuccessful cardioversion    Remains on Xarelto    PLAN:    Continue metoprolol tartrate 50 mg twice daily    Discontinue amiodarone therapy for now, but understands he may need to restart following repeat ablation    Continue Xarelto    Plan repeat pulmonary vein isolation last week of March or early April.    Continue to avoid alcohol      2.  Heart failure with preserved ejection fraction    Likely exacerbated by recurrent atrial fibrillation and alcohol use    Echocardiogram, as above, continues to show normal ejection fraction and normal right ventricular function/structure    He has been on Lasix 40 mg and potassium 20 mEq daily, with good results, including 10 pound weight loss and decreased edema.  Exam today without bibasilar crackles, but still has edema, JVD and hepatojugular reflux    PLAN:    Continue to avoid alcohol    Continue Lasix 40 mg daily and potassium chloride 20 mEq daily    See me in 1 week with a BMP, at which time I hope he is euvolemic    3.  Benign essential hypertension     Continues losartan, furosemide and metoprolol    Blood pressure today looks wonderful, likely a combination of diuresis, medications and the fact that he has quit drinking    PLAN:    No changes to medications    Crystal Campos PA-C, MSPAS      Orders Placed  This Encounter   Procedures     Basic metabolic panel     Follow-Up with Cardiac Advanced Practice Provider     EKG 12-lead complete w/read - Clinics (to be scheduled)     Orders Placed This Encounter   Medications     DISCONTD: amiodarone (PACERONE/CODARONE) 200 MG tablet     Sig: Take 200 mg by mouth daily     furosemide (LASIX) 20 MG tablet     Sig: Take 2 tablets (40 mg) by mouth daily     potassium chloride ER (KLOR-CON) 10 MEQ CR tablet     Sig: Take 2 tablets (20 mEq) by mouth daily     Medications Discontinued During This Encounter   Medication Reason     acetaminophen (TYLENOL) 325 MG tablet Stopped by Patient     amiodarone (PACERONE/CODARONE) 200 MG tablet      furosemide (LASIX) 20 MG tablet      potassium chloride ER (KLOR-CON) 10 MEQ CR tablet          Encounter Diagnoses   Name Primary?     Persistent atrial fibrillation (H)      Benign essential hypertension      Peripheral edema        CURRENT MEDICATIONS:  Current Outpatient Medications   Medication Sig Dispense Refill     allopurinol (ZYLOPRIM) 300 MG tablet Take 1 tablet (300 mg) by mouth daily 90 tablet 3     amLODIPine (NORVASC) 2.5 MG tablet Take 1 tablet (2.5 mg) by mouth daily 90 tablet 3     finasteride (PROPECIA) 1 MG tablet Take 1 mg by mouth daily.       furosemide (LASIX) 20 MG tablet Take 2 tablets (40 mg) by mouth daily       LORazepam (ATIVAN) 0.5 MG tablet Take 1-2 tablets (0.5-1 mg) by mouth every 6 hours as needed for anxiety (alcohol withdrawal) 30 tablet 0     losartan (COZAAR) 100 MG tablet Take 1 tablet (100 mg) by mouth daily 90 tablet 3     metoprolol tartrate (LOPRESSOR) 50 MG tablet Take 1 tablet (50 mg) by mouth 2 times daily 180 tablet 3     order for DME Equipment ordered: RESMED CPAP Mask type: Full face  Settings: 15       potassium chloride ER (KLOR-CON) 10 MEQ CR tablet Take 2 tablets (20 mEq) by mouth daily       XARELTO 20 MG TABS tablet take 1 tablet (20mg) by mouth daily with dinner) avoid use of alcohol while  using medication. 30 tablet 11       ALLERGIES     Allergies   Allergen Reactions     No Known Drug Allergies        PAST MEDICAL HISTORY:  Past Medical History:   Diagnosis Date     Essential hypertension, benign      H/O ETOH abuse      Herpes simplex without mention of complication      Hyperlipidemia      Obesity      Persistent atrial fibrillation (H)     persistent, DCCV 2017, ablation 17     Tobacco use disorder     dced 2004       PAST SURGICAL HISTORY:  Past Surgical History:   Procedure Laterality Date     ANESTHESIA CARDIOVERSION N/A 2019    Procedure: ANESTHESIA CARDIOVERSION (PRIETO);  Surgeon: GENERIC ANESTHESIA PROVIDER;  Location:  OR     C NONSPECIFIC PROCEDURE      achilles tendon     C NONSPECIFIC PROCEDURE      knees, arthroscopy     C NONSPECIFIC PROCEDURE      basal cell skin ca, nose     C NONSPECIFIC PROCEDURE      flex sig; colonoscopy due 3/2008     C NONSPECIFIC PROCEDURE      cardioversion for atr fib     C TOTAL KNEE ARTHROPLASTY Left 2018    Dr. Malick Suarez     HERNIORRHAPHY UMBILICAL  2012    Procedure: HERNIORRHAPHY UMBILICAL;  UMBILICAL HERNIA REPAIR;  Surgeon: Ra Crocker MD;  Location: [facility]     ORTHOPEDIC SURGERY         FAMILY HISTORY:  Family History   Problem Relation Age of Onset     Family History Negative Mother      Heart Disease Father         decesased at 42 - MI     Heart Disease Sister          MI     Heart Disease Brother          MI     Heart Disease Brother         CABG AT 49     Lipids Sister      Lipids Sister      Lipids Sister      Family History Negative Brother        SOCIAL HISTORY:  Social History     Socioeconomic History     Marital status: Single     Spouse name: None     Number of children: None     Years of education: None     Highest education level: None   Occupational History     None   Social Needs     Financial resource strain: None     Food insecurity:     Worry: None     Inability: None  "    Transportation needs:     Medical: None     Non-medical: None   Tobacco Use     Smoking status: Former Smoker     Packs/day: 1.00     Years: 10.00     Pack years: 10.00     Types: Cigarettes     Start date:      Last attempt to quit: 2011     Years since quittin.5     Smokeless tobacco: Never Used   Substance and Sexual Activity     Alcohol use: No     Frequency: Never     Drug use: No     Sexual activity: Yes     Partners: Female   Lifestyle     Physical activity:     Days per week: None     Minutes per session: None     Stress: None   Relationships     Social connections:     Talks on phone: None     Gets together: None     Attends [employer] service: None     Active member of club or organization: None     Attends meetings of clubs or organizations: None     Relationship status: None     Intimate partner violence:     Fear of current or ex partner: None     Emotionally abused: None     Physically abused: None     Forced sexual activity: None   Other Topics Concern     Parent/sibling w/ CABG, MI or angioplasty before 65F 55M? Not Asked   Social History Narrative     None       Review of Systems:  Skin:  Negative     Eyes:  Positive for glasses(reading only)  ENT:  Negative    Respiratory:  Positive for sleep apnea;CPAP;dyspnea on exertion  Cardiovascular:  palpitations;chest pain;syncope or near-syncope;cyanosis;lightheadedness;dizziness;Negative for Positive for;edema;exercise intolerance;fatigue  Gastroenterology: Negative for hematochezia;melena  Genitourinary:  Positive for urinary frequency  Musculoskeletal:  Negative    Neurologic:  Negative    Psychiatric:  Negative    Heme/Lymph/Imm:  Negative    Endocrine:  Negative      Physical Exam:  Vitals: /88   Pulse 68   Ht 1.702 m (5' 7\")   Wt 135.1 kg (297 lb 12.8 oz)   BMI 46.64 kg/m      Constitutional:  cooperative, alert and oriented, well developed, well nourished, in no acute distress obese      Skin:  warm and dry to the touch, no " apparent skin lesions or masses noted        Head:  normocephalic        Eyes:  pupils equal and round;conjunctivae and lids unremarkable;sclera white        ENT:  no pallor or cyanosis, dentition good        Neck:  no carotid bruit JVP 10-12;hepatojugular reflux      Chest:  normal breath sounds, clear to auscultation, normal A-P diameter, normal symmetry, normal respiratory excursion, no use of accessory muscles        Cardiac: no murmurs, gallops or rubs detected irregularly irregular rhythm                Abdomen:  non-tender;BS normoactive obese      Vascular: not assessed this visit                                      Extremities and Back:      improved    Neurological:  no gross motor deficits          Recent Lab Results:  LIPID RESULTS:  Lab Results   Component Value Date    CHOL 193 02/25/2019    HDL 45 02/25/2019     (H) 02/25/2019    TRIG 165 (H) 02/25/2019    CHOLHDLRATIO 3.8 03/16/2015       LIVER ENZYME RESULTS:  Lab Results   Component Value Date    AST 22 03/12/2019    ALT 40 03/12/2019       CBC RESULTS:  Lab Results   Component Value Date    WBC 10.5 11/06/2017    RBC 4.55 11/06/2017    HGB 16.2 11/06/2017    HCT 46.9 11/06/2017     (H) 11/06/2017    MCH 35.6 (H) 11/06/2017    MCHC 34.5 11/06/2017    RDW 13.3 11/06/2017     11/06/2017       BMP RESULTS:  Lab Results   Component Value Date     03/12/2019    POTASSIUM 4.0 03/12/2019    CHLORIDE 105 03/12/2019    CO2 28 03/12/2019    ANIONGAP 12 03/12/2019     03/12/2019    BUN 21 03/12/2019    CR 1.24 03/12/2019    GFRESTIMATED 58 (L) 03/12/2019    GFRESTBLACK 70 03/12/2019    ASHELY 9.1 03/12/2019        A1C RESULTS:  Lab Results   Component Value Date    A1C 5.7 (H) 02/25/2019       INR RESULTS:  Lab Results   Component Value Date    INR 2.04 (H) 02/26/2019    INR 1.66 (H) 11/06/2017           CC  Johnny Kay MD  [address] [place] [address]

## 2019-03-12 NOTE — LETTER
3/12/2019    Johnny Kay MD  [address] [facility][1] [address]    RE: Mike Schultz       Dear Colleague,    I had the pleasure of seeing Mike BRAXTON Schultz in the [facility].    HPI:   I had the pleasure of seeing Brayden when he came for follow up of atrial fibrillation.  He is a 68 year old who sees Dr. Day for his history of:     1. Atrial Fibrillation diagnosed in 2012, treated with flecainide and DCCV with success until 12/2016. He then developed fatigue/lack of energy and fluid retention and was back in AFib 12/2016.  He underwent PVI and SVC and CTI ablation 11/2017 with good success.  Unfortunately, back in AFib 1/2019. He was placed on amiodarone with attempt x 3 at DCCV on 2/26, which were unsuccessful in restoring sinus rhythm. We are currently working on a short-term rate control strategy while he gets his risk factors under control, with plans for rhythm control ASAP  2. Preserved EF based on echo 10/2017 and EF 51% on cMRI 10/2017  3. Benign Essential HTN   4. BOWEN  5. Myocardiac Scar noted on cardiac MRI 10/2017, consistent with prior MI in the left Cx territory. EF 51%  6. Alcohol use (5-6 mini-bottles alcohol/day) -  Roughly 2/3 pint daily    When I last saw Brayden we discussed his unsuccessful atrial fibrillation ablation despite the use of amiodarone therapy.  I plan to discuss repeat pulmonary vein isolation, potentially focusing on non-PV triggers, such as the left atrial posterior wall, but it came out that he was drinking quite a bit of alcohol (5-6 mini bottles of alcohol, each roughly 1.7 fluid ounces) daily.  He also showed evidence of heart failure, and I recommend to discontinue drinking, continued rate control with Toprol 50 mg twice daily (for now) and increased his Lasix to 40 mg for 5 days, then decreasing to 20 mg daily.  I also increased his potassium supplement. At that time, we thought he was off of Amiodarone as instructed since 2/27.    It  "turns out that he is actually been taking amiodarone 200 mg daily.    Overall, he is already feeling better.  He has not had alcohol since our last visit 3/4.  He does admit that the first 4 days were difficult, and admits that he actually probably was having up to 10 bottles per day (1 pint) at times.    He diuresed dramatically, and he states his legs feel \"less heavy.\"  They do not yet feel like normal.  He has not had orthopnea or PND.  He feels his dyspnea is improved.  He denies any problems of chest pain, pressure or tightness.  Denies palpitations.  He does continue to note fatigue and shortness of breath, which have been his symptoms of atrial fibrillation in the past.    He is compliant with Xarelto, and has not missed any doses whatsoever.    Echocardiogram today showed EF of 55-60%.  No regional wall motion abnormalities were noted.  He had 1+ mitral regurgitation and 1+ tricuspid regurgitation.  RV was normal in structure, function and size.  Aortic valve showed just trace to mild aortic regurgitation with aortic sclerosis.  Mean gradient was 4.6 mmHg (improved from previous) volume index is 46.7 mL/m  with LA dimension of 4.9 cm.  Component      Latest Ref Rng & Units 3/12/2019   Bilirubin Direct      0.0 - 0.2 mg/dL 0.2   Bilirubin Total      0.2 - 1.3 mg/dL 0.7   Albumin      3.4 - 5.0 g/dL 3.7   Protein Total      6.8 - 8.8 g/dL 7.4   Alkaline Phosphatase      40 - 150 U/L 82   ALT      0 - 70 U/L 40   AST      0 - 45 U/L 22     Component      Latest Ref Rng & Units 2/25/2019 3/12/2019   Sodium      136 - 145 mmol/L 141 141   Potassium      3.5 - 5.1 mmol/L 4.1 4.0   Chloride      98 - 107 mmol/L 105 105   Carbon Dioxide      23 - 29 mmol/L 32 28   Anion Gap      6 - 17 mmol/L 4 12   Glucose      70 - 105 mg/dL 129 (H) 100   Urea Nitrogen      7 - 30 mg/dL 22 21   Creatinine      0.70 - 1.30 mg/dL 1.24 1.24   GFR Estimate      >60 mL/min/1.73:m2 60 (L) 58 (L)   GFR Estimate If Black      >60 " mL/min/1.73:m2 69 70   Calcium      8.5 - 10.5 mg/dL 8.4 (L) 9.1     Echocardiogram 10/2017 showed normal ejection fraction 55-60% with mild left ventricular hypertrophy.  Mild to moderate/moderate aortic stenosis with a mean gradient of 11.  Aortic valve area was 1.4 cm .  1+ mitral regurgitation.  Ascending aorta was mildly dilated at 4.2 cm above the sinotubular ridge.    Cardiac MRI 10/2017 showed an EF of 51% with mild hypokinesis involving the basal inferolateral segment.  Evidence of previous myocardial infarction in the circumflex territory was noted.    Assessment & Plan:    1.  Persistent atrial fibrillation, now with heart failure    As above, underwent pulmonary vein isolation 11/2017.  He was back in A. fib as of 1/2019 and had to be developed fluid overload with shortness of breath, edema and weight gain.    It turns out that he did not stop amiodarone 2/27 after his unsuccessful cardioversion    Remains on Xarelto    PLAN:    Continue metoprolol tartrate 50 mg twice daily    Discontinue amiodarone therapy for now, but understands he may need to restart following repeat ablation    Continue Xarelto    Plan repeat pulmonary vein isolation last week of March or early April.    Continue to avoid alcohol      2.  Heart failure with preserved ejection fraction    Likely exacerbated by recurrent atrial fibrillation and alcohol use    Echocardiogram, as above, continues to show normal ejection fraction and normal right ventricular function/structure    He has been on Lasix 40 mg and potassium 20 mEq daily, with good results, including 10 pound weight loss and decreased edema.  Exam today without bibasilar crackles, but still has edema, JVD and hepatojugular reflux    PLAN:    Continue to avoid alcohol    Continue Lasix 40 mg daily and potassium chloride 20 mEq daily    See me in 1 week with a BMP, at which time I hope he is euvolemic    3.  Benign essential hypertension     Continues losartan, furosemide and  metoprolol    Blood pressure today looks wonderful, likely a combination of diuresis, medications and the fact that he has quit drinking    PLAN:    No changes to medications    Crystal Campos PA-C, MSPAS      Orders Placed This Encounter   Procedures     Basic metabolic panel     Follow-Up with Cardiac Advanced Practice Provider     EKG 12-lead complete w/read - Clinics (to be scheduled)     Orders Placed This Encounter   Medications     DISCONTD: amiodarone (PACERONE/CODARONE) 200 MG tablet     Sig: Take 200 mg by mouth daily     furosemide (LASIX) 20 MG tablet     Sig: Take 2 tablets (40 mg) by mouth daily     potassium chloride ER (KLOR-CON) 10 MEQ CR tablet     Sig: Take 2 tablets (20 mEq) by mouth daily     Medications Discontinued During This Encounter   Medication Reason     acetaminophen (TYLENOL) 325 MG tablet Stopped by Patient     amiodarone (PACERONE/CODARONE) 200 MG tablet      furosemide (LASIX) 20 MG tablet      potassium chloride ER (KLOR-CON) 10 MEQ CR tablet          Encounter Diagnoses   Name Primary?     Persistent atrial fibrillation (H)      Benign essential hypertension      Peripheral edema        CURRENT MEDICATIONS:  Current Outpatient Medications   Medication Sig Dispense Refill     allopurinol (ZYLOPRIM) 300 MG tablet Take 1 tablet (300 mg) by mouth daily 90 tablet 3     amLODIPine (NORVASC) 2.5 MG tablet Take 1 tablet (2.5 mg) by mouth daily 90 tablet 3     finasteride (PROPECIA) 1 MG tablet Take 1 mg by mouth daily.       furosemide (LASIX) 20 MG tablet Take 2 tablets (40 mg) by mouth daily       LORazepam (ATIVAN) 0.5 MG tablet Take 1-2 tablets (0.5-1 mg) by mouth every 6 hours as needed for anxiety (alcohol withdrawal) 30 tablet 0     losartan (COZAAR) 100 MG tablet Take 1 tablet (100 mg) by mouth daily 90 tablet 3     metoprolol tartrate (LOPRESSOR) 50 MG tablet Take 1 tablet (50 mg) by mouth 2 times daily 180 tablet 3     order for DME Equipment ordered: RESMED CPAP Mask type: Full  face  Settings: 15       potassium chloride ER (KLOR-CON) 10 MEQ CR tablet Take 2 tablets (20 mEq) by mouth daily       XARELTO 20 MG TABS tablet take 1 tablet (20mg) by mouth daily with dinner) avoid use of alcohol while using medication. 30 tablet 11       ALLERGIES     Allergies   Allergen Reactions     No Known Drug Allergies        PAST MEDICAL HISTORY:  Past Medical History:   Diagnosis Date     Essential hypertension, benign      H/O ETOH abuse      Herpes simplex without mention of complication      Hyperlipidemia      Obesity      Persistent atrial fibrillation (H)     persistent, DCCV 2017, ablation 17     Tobacco use disorder     dced 2004       PAST SURGICAL HISTORY:  Past Surgical History:   Procedure Laterality Date     ANESTHESIA CARDIOVERSION N/A 2019    Procedure: ANESTHESIA CARDIOVERSION (PRIETO);  Surgeon: GENERIC ANESTHESIA PROVIDER;  Location:  OR      NONSPECIFIC PROCEDURE      achilles tendon     C NONSPECIFIC PROCEDURE      knees, arthroscopy     C NONSPECIFIC PROCEDURE      basal cell skin ca, nose     C NONSPECIFIC PROCEDURE      flex sig; colonoscopy due 3/2008     C NONSPECIFIC PROCEDURE      cardioversion for atr fib     C TOTAL KNEE ARTHROPLASTY Left 2018    Dr. Malick Suarez     HERNIORRHAPHY UMBILICAL  2012    Procedure: HERNIORRHAPHY UMBILICAL;  UMBILICAL HERNIA REPAIR;  Surgeon: Ra Crocker MD;  Location: [facility]     ORTHOPEDIC SURGERY         FAMILY HISTORY:  Family History   Problem Relation Age of Onset     Family History Negative Mother      Heart Disease Father         decesased at 42 - MI     Heart Disease Sister          MI     Heart Disease Brother          MI     Heart Disease Brother         CABG AT 49     Lipids Sister      Lipids Sister      Lipids Sister      Family History Negative Brother        SOCIAL HISTORY:  Social History     Socioeconomic History     Marital status: Single     Spouse name: None     Number  of children: None     Years of education: None     Highest education level: None   Occupational History     None   Social Needs     Financial resource strain: None     Food insecurity:     Worry: None     Inability: None     Transportation needs:     Medical: None     Non-medical: None   Tobacco Use     Smoking status: Former Smoker     Packs/day: 1.00     Years: 10.00     Pack years: 10.00     Types: Cigarettes     Start date:      Last attempt to quit: 2011     Years since quittin.5     Smokeless tobacco: Never Used   Substance and Sexual Activity     Alcohol use: No     Frequency: Never     Drug use: No     Sexual activity: Yes     Partners: Female   Lifestyle     Physical activity:     Days per week: None     Minutes per session: None     Stress: None   Relationships     Social connections:     Talks on phone: None     Gets together: None     Attends [employer] service: None     Active member of club or organization: None     Attends meetings of clubs or organizations: None     Relationship status: None     Intimate partner violence:     Fear of current or ex partner: None     Emotionally abused: None     Physically abused: None     Forced sexual activity: None   Other Topics Concern     Parent/sibling w/ CABG, MI or angioplasty before 65F 55M? Not Asked   Social History Narrative     None       Review of Systems:  Skin:  Negative     Eyes:  Positive for glasses(reading only)  ENT:  Negative    Respiratory:  Positive for sleep apnea;CPAP;dyspnea on exertion  Cardiovascular:  palpitations;chest pain;syncope or near-syncope;cyanosis;lightheadedness;dizziness;Negative for Positive for;edema;exercise intolerance;fatigue  Gastroenterology: Negative for hematochezia;melena  Genitourinary:  Positive for urinary frequency  Musculoskeletal:  Negative    Neurologic:  Negative    Psychiatric:  Negative    Heme/Lymph/Imm:  Negative    Endocrine:  Negative      Physical Exam:  Vitals: /88   Pulse 68   Ht  "1.702 m (5' 7\")   Wt 135.1 kg (297 lb 12.8 oz)   BMI 46.64 kg/m       Constitutional:  cooperative, alert and oriented, well developed, well nourished, in no acute distress obese      Skin:  warm and dry to the touch, no apparent skin lesions or masses noted        Head:  normocephalic        Eyes:  pupils equal and round;conjunctivae and lids unremarkable;sclera white        ENT:  no pallor or cyanosis, dentition good        Neck:  no carotid bruit JVP 10-12;hepatojugular reflux      Chest:  normal breath sounds, clear to auscultation, normal A-P diameter, normal symmetry, normal respiratory excursion, no use of accessory muscles        Cardiac: no murmurs, gallops or rubs detected irregularly irregular rhythm                Abdomen:  non-tender;BS normoactive obese      Vascular: not assessed this visit                                      Extremities and Back:      improved    Neurological:  no gross motor deficits          Recent Lab Results:  LIPID RESULTS:  Lab Results   Component Value Date    CHOL 193 02/25/2019    HDL 45 02/25/2019     (H) 02/25/2019    TRIG 165 (H) 02/25/2019    CHOLHDLRATIO 3.8 03/16/2015       LIVER ENZYME RESULTS:  Lab Results   Component Value Date    AST 22 03/12/2019    ALT 40 03/12/2019       CBC RESULTS:  Lab Results   Component Value Date    WBC 10.5 11/06/2017    RBC 4.55 11/06/2017    HGB 16.2 11/06/2017    HCT 46.9 11/06/2017     (H) 11/06/2017    MCH 35.6 (H) 11/06/2017    MCHC 34.5 11/06/2017    RDW 13.3 11/06/2017     11/06/2017       BMP RESULTS:  Lab Results   Component Value Date     03/12/2019    POTASSIUM 4.0 03/12/2019    CHLORIDE 105 03/12/2019    CO2 28 03/12/2019    ANIONGAP 12 03/12/2019     03/12/2019    BUN 21 03/12/2019    CR 1.24 03/12/2019    GFRESTIMATED 58 (L) 03/12/2019    GFRESTBLACK 70 03/12/2019    ASHELY 9.1 03/12/2019        A1C RESULTS:  Lab Results   Component Value Date    A1C 5.7 (H) 02/25/2019       INR " RESULTS:  Lab Results   Component Value Date    INR 2.04 (H) 02/26/2019    INR 1.66 (H) 11/06/2017         Thank you for allowing me to participate in the care of your patient.    Sincerely,     Natalie Campos PA-C     [facility]

## 2019-03-12 NOTE — PATIENT INSTRUCTIONS
"1. Weight is down 10 pounds and kidneys are happy on this higher dose of Lasix. You're feeling better with less \"heavy legs.\"      2. Continue Lasix 40 mg (2 tabs) and potassium 20 mEq (2 of the 10s) daily    3. See me next week with blood work    4. STOP amiodarone     5. Continue Xarelto 20 mg daily. DO NOT MISS ANY DOSES!      "

## 2019-03-26 DIAGNOSIS — L65.9 ALOPECIA: Primary | ICD-10-CM

## 2019-03-26 NOTE — TELEPHONE ENCOUNTER
"Requested Prescriptions   Pending Prescriptions Disp Refills     finasteride (PROPECIA) 1 MG tablet 90 tablet 0     Sig: Take 1 tablet (1 mg) by mouth daily    Miscellaneous Dermatologic Agents Failed - 3/26/2019  2:44 PM       Failed - Refill request is not for Imiquimod, 5-Fluorouracil, or Finasteride     If Imiquimod, 5-Fluorouracil, or Finasteride, may refill if indicated in progress notes.          Passed - Recent (12 mo) or future (30 days) visit within the authorizing provider's specialty    Patient had office visit in the last 12 months or has a visit in the next 30 days with authorizing provider or within the authorizing provider's specialty.  See \"Patient Info\" tab in inbasket, or \"Choose Columns\" in Meds & Orders section of the refill encounter.             Passed - Medication is active on med list       Passed - Patient is 24 mos old or older        Routing refill request to provider for review/approval because:  Medication is reported/historical        "

## 2019-03-26 NOTE — TELEPHONE ENCOUNTER
[facility] sent a request for finasteride (PROSCAR) 5 MG tablet.         Last Written Prescription Date:  6/29/11  Last Fill Quantity: ,   # refills:   Last Office Visit: 2/27/19  Future Office visit:    Next 5 appointments (look out 90 days)    Mar 28, 2019  3:10 PM CDT  Return Visit with Natalie Campos PA-C  [facility] ([facility]) [address] [place] [id number]  [phone] OPT 2   Jun 11, 2019  3:30 PM CDT  Office Visit with Johnny Kay MD  [facility] ([facility]) [address] [address] [phone]           Routing refill request to provider for review/approval because:  Drug not active on patient's medication list

## 2019-03-27 RX ORDER — FINASTERIDE 1 MG/1
1 TABLET, FILM COATED ORAL DAILY
Qty: 90 TABLET | Refills: 3 | Status: SHIPPED | OUTPATIENT
Start: 2019-03-27 | End: 2020-03-06

## 2019-03-28 ENCOUNTER — OFFICE VISIT (OUTPATIENT)
Dept: CARDIOLOGY | Facility: CLINIC | Age: 69
End: 2019-03-28
Payer: COMMERCIAL

## 2019-03-28 VITALS
SYSTOLIC BLOOD PRESSURE: 133 MMHG | DIASTOLIC BLOOD PRESSURE: 91 MMHG | WEIGHT: 293 LBS | HEIGHT: 67 IN | BODY MASS INDEX: 45.99 KG/M2 | HEART RATE: 85 BPM

## 2019-03-28 DIAGNOSIS — I48.19 PERSISTENT ATRIAL FIBRILLATION (H): ICD-10-CM

## 2019-03-28 DIAGNOSIS — I48.19 PERSISTENT ATRIAL FIBRILLATION (H): Primary | ICD-10-CM

## 2019-03-28 DIAGNOSIS — R60.0 PERIPHERAL EDEMA: ICD-10-CM

## 2019-03-28 LAB
ANION GAP SERPL CALCULATED.3IONS-SCNC: 11.3 MMOL/L (ref 6–17)
BUN SERPL-MCNC: 22 MG/DL (ref 7–30)
CALCIUM SERPL-MCNC: 9.1 MG/DL (ref 8.5–10.5)
CHLORIDE SERPL-SCNC: 100 MMOL/L (ref 98–107)
CO2 SERPL-SCNC: 32 MMOL/L (ref 23–29)
CREAT SERPL-MCNC: 1.27 MG/DL (ref 0.7–1.3)
GFR SERPL CREATININE-BSD FRML MDRD: 57 ML/MIN/{1.73_M2}
GLUCOSE SERPL-MCNC: 107 MG/DL (ref 70–105)
POTASSIUM SERPL-SCNC: 3.3 MMOL/L (ref 3.5–5.1)
SODIUM SERPL-SCNC: 140 MMOL/L (ref 136–145)

## 2019-03-28 PROCEDURE — 80048 BASIC METABOLIC PNL TOTAL CA: CPT | Performed by: PHYSICIAN ASSISTANT

## 2019-03-28 PROCEDURE — 93000 ELECTROCARDIOGRAM COMPLETE: CPT | Performed by: PHYSICIAN ASSISTANT

## 2019-03-28 PROCEDURE — 99214 OFFICE O/P EST MOD 30 MIN: CPT | Performed by: PHYSICIAN ASSISTANT

## 2019-03-28 PROCEDURE — 36415 COLL VENOUS BLD VENIPUNCTURE: CPT | Performed by: PHYSICIAN ASSISTANT

## 2019-03-28 RX ORDER — POTASSIUM CHLORIDE 750 MG/1
30 TABLET, EXTENDED RELEASE ORAL DAILY
Start: 2019-03-28 | End: 2020-01-03

## 2019-03-28 ASSESSMENT — MIFFLIN-ST. JEOR: SCORE: 2062.67

## 2019-03-28 NOTE — LETTER
3/28/2019    Johnny Kay MD  [address] [facility] [id number]    RE: Mike Schultz       Dear Colleague,    I had the pleasure of seeing Mike BRAXTON Schultz in the [facility].    HPI:   I had the pleasure of seeing Brayden when he came for follow up of atrial fibrillation.  He is a 68 year old who sees Dr. Day for his history of:     1. Atrial Fibrillation diagnosed in 2012, treated with flecainide and DCCV with success until 12/2016. He then developed fatigue/lack of energy and fluid retention and was back in AFib 12/2016.  He underwent PVI and SVC and CTI ablation 11/2017 with good success.  Unfortunately, back in AFib 1/2019. He was placed on amiodarone with attempt x 3 at DCCV on 2/26, which were unsuccessful in restoring sinus rhythm. We are currently working on a short-term rate control strategy while he gets his risk factors under control, with plans for rhythm control ASAP.  His repeat ablation is scheduled 4/12/19 with Dr. Day  2. Preserved EF based on echo 10/2017 and EF 51% on cMRI 10/2017  3. Benign Essential HTN   4. BOWEN  5. Myocardiac Scar noted on cardiac MRI 10/2017, consistent with prior MI in the left Cx territory. EF 51%.  Avoiding flecainide use  6. Alcohol use (up to 10 mini-bottles alcohol/day), and had not been drinking at his last appointment with me 3/12, but is subsequently started again.         I last saw Brayden 3/12.  At that time, he was doing quite a bit better.  He had not had any alcohol since 3/4, had diuresed tremendously on Lasix with a 10 pound weight loss.  Blood pressure was under good control.  He was still in atrial fibrillation.  I had him discontinue amiodarone, which was Dr. Day's recommendation after his failed cardioversion 2/27.    Since then, his weight is down another 4 pounds, but unfortunately he has started drinking again.  He knows that this is an issue for him, and is previously talked to Dr. Kay about Alcoholics  Anonymous.    He still thinks that his breathing is quite a bit better, without complaints of orthopnea or PND.  His edema is about the same.  He denies any problems of chest pain, pressure or tightness.  He remains unaware of his atrial fibrillation with the exception of fluid overload, which is been his typical symptom.    Echocardiogram 3/2019 showed EF of 55-60%.  No regional wall motion abnormalities were noted.  He had 1+ mitral regurgitation and 1+ tricuspid regurgitation.  RV was normal in structure, function and size.  Aortic valve showed just trace to mild aortic regurgitation with aortic sclerosis.  Mean gradient was 4.6 mmHg (improved from previous) volume index is 46.7 mL/m  with LA dimension of 4.9 cm.    Cardiac MRI 10/2017 showed an EF of 51% with mild hypokinesis involving the basal inferolateral segment.  Evidence of previous myocardial infarction in the circumflex territory was noted.  Component      Latest Ref Rng & Units 2/25/2019 3/12/2019 3/28/2019   Sodium      136 - 145 mmol/L 141 141 140   Potassium      3.5 - 5.1 mmol/L 4.1 4.0 3.3 (L)   Chloride      98 - 107 mmol/L 105 105 100   Carbon Dioxide      23 - 29 mmol/L 32 28 32 (H)   Anion Gap      6 - 17 mmol/L 4 12 11.3   Glucose      70 - 105 mg/dL 129 (H) 100 107 (H)   Urea Nitrogen      7 - 30 mg/dL 22 21 22   Creatinine      0.70 - 1.30 mg/dL 1.24 1.24 1.27   GFR Estimate      >60 mL/min/1.73:m2 60 (L) 58 (L) 57 (L)   GFR Estimate If Black      >60 mL/min/1.73:m2 69 70 68   Calcium      8.5 - 10.5 mg/dL 8.4 (L) 9.1 9.1       Assessment & Plan:    1.  Persistent atrial fibrillation with preserved EF, complicated by fluid overload    As above, underwent PVI 11/2017.  Back in A. fib 1/2019 and had developed heart failure with shortness of breath, edema and weight gain.  These are his typical symptoms    Continues on Xarelto without interruption.  Last LFTs looked good 3/2019    Heart rate remains controlled on EKG    PLAN:    Plan for  repeat ablation 4/12/19 with Dr. Day.      We discussed the risks, benefits and indications of proceeding with an electrophysiology study and pulmonary vein isolation/atrial fibrillation ablation, including but not limited to use of anesthesia (including intubation and bladder catheter), peripheral vessel injury, discomfort, bruising, bleeding, esophageal injury, diaphragmatic injury, cardiac puncture and/or tamponade requiring emergency treatment, pulmonary vein stenosis requiring intervention, and stroke/TIA. We reviewed that additional procedures may be required.  We also briefly discussed post-procedural restrictions and post-procedural discomfort.  The patient voiced understanding and is willing to proceed.  A consent form will be signed by the procedural physician.      He will continue Xarelto without interruption.    His MRA from 10/2017 will be used for mapping.  He has remained on Xarelto without interruption so will not require RAMIRO.      2.  Heart failure with preserved ejection fraction    Likely exacerbated by recurrent atrial fibrillation and alcohol use.  EF on echo 3/2019 remains stable and normal at 55%    Currently on Lasix 40 mg daily and potassium chloride 20 mEq daily.  BMP as above shows hypokalemia, which is new    PLAN:    Continue Lasix 40 mg daily    Increase potassium to 30 mEq daily    Discontinue alcohol    3.  Benign essential hypertension    Continue losartan, furosemide and metoprolol    PLAN:    Discontinue alcohol      Crystal Campos PA-C, MSPAS      Orders Placed This Encounter   Procedures     EKG 12-lead complete w/read - Clinics (performed today)     Orders Placed This Encounter   Medications     potassium chloride ER (KLOR-CON) 10 MEQ CR tablet     Sig: Take 3 tablets (30 mEq) by mouth daily     Medications Discontinued During This Encounter   Medication Reason     potassium chloride ER (KLOR-CON) 10 MEQ CR tablet          Encounter Diagnoses   Name Primary?     Persistent atrial  fibrillation (H) Yes     Peripheral edema        CURRENT MEDICATIONS:  Current Outpatient Medications   Medication Sig Dispense Refill     allopurinol (ZYLOPRIM) 300 MG tablet Take 1 tablet (300 mg) by mouth daily 90 tablet 3     amLODIPine (NORVASC) 2.5 MG tablet Take 1 tablet (2.5 mg) by mouth daily 90 tablet 3     finasteride (PROPECIA) 1 MG tablet Take 1 tablet (1 mg) by mouth daily 90 tablet 3     furosemide (LASIX) 20 MG tablet Take 2 tablets (40 mg) by mouth daily       LORazepam (ATIVAN) 0.5 MG tablet Take 1-2 tablets (0.5-1 mg) by mouth every 6 hours as needed for anxiety (alcohol withdrawal) 30 tablet 0     losartan (COZAAR) 100 MG tablet Take 1 tablet (100 mg) by mouth daily 90 tablet 3     metoprolol tartrate (LOPRESSOR) 50 MG tablet Take 1 tablet (50 mg) by mouth 2 times daily 180 tablet 3     order for DME Equipment ordered: RESMED CPAP Mask type: Full face  Settings: 15       potassium chloride ER (KLOR-CON) 10 MEQ CR tablet Take 3 tablets (30 mEq) by mouth daily       XARELTO 20 MG TABS tablet take 1 tablet (20mg) by mouth daily with dinner) avoid use of alcohol while using medication. 30 tablet 11       ALLERGIES     Allergies   Allergen Reactions     No Known Drug Allergies        PAST MEDICAL HISTORY:  Past Medical History:   Diagnosis Date     Essential hypertension, benign      H/O ETOH abuse      Herpes simplex without mention of complication      Hyperlipidemia      Obesity      Persistent atrial fibrillation (H)     persistent, DCCV 12/2017, ablation 11/6/17     Tobacco use disorder     dced 6/2004       PAST SURGICAL HISTORY:  Past Surgical History:   Procedure Laterality Date     ANESTHESIA CARDIOVERSION N/A 2/26/2019    Procedure: ANESTHESIA CARDIOVERSION (PRIETO);  Surgeon: GENERIC ANESTHESIA PROVIDER;  Location: [facility] OR     C NONSPECIFIC PROCEDURE  1997    achilles tendon     C NONSPECIFIC PROCEDURE      knees, arthroscopy     C NONSPECIFIC PROCEDURE      basal cell skin ca, nose     C  NONSPECIFIC PROCEDURE      flex sig; colonoscopy due 3/2008     C NONSPECIFIC PROCEDURE      cardioversion for atr fib     C TOTAL KNEE ARTHROPLASTY Left 2018    Dr. Malick Suarez     HERNIORRHAPHY UMBILICAL  2012    Procedure: HERNIORRHAPHY UMBILICAL;  UMBILICAL HERNIA REPAIR;  Surgeon: Ra Crocker MD;  Location: [facility]     ORTHOPEDIC SURGERY         FAMILY HISTORY:  Family History   Problem Relation Age of Onset     Family History Negative Mother      Heart Disease Father         decesased at 42 - MI     Heart Disease Sister          MI     Heart Disease Brother          MI     Heart Disease Brother         CABG AT 49     Lipids Sister      Lipids Sister      Lipids Sister      Family History Negative Brother        SOCIAL HISTORY:  Social History     Socioeconomic History     Marital status: Single     Spouse name: None     Number of children: None     Years of education: None     Highest education level: None   Occupational History     None   Social Needs     Financial resource strain: None     Food insecurity:     Worry: None     Inability: None     Transportation needs:     Medical: None     Non-medical: None   Tobacco Use     Smoking status: Former Smoker     Packs/day: 1.00     Years: 10.00     Pack years: 10.00     Types: Cigarettes     Start date:      Last attempt to quit: 2011     Years since quittin.6     Smokeless tobacco: Never Used   Substance and Sexual Activity     Alcohol use: No     Frequency: Never     Drug use: No     Sexual activity: Yes     Partners: Female   Lifestyle     Physical activity:     Days per week: None     Minutes per session: None     Stress: None   Relationships     Social connections:     Talks on phone: None     Gets together: None     Attends Mu-ism service: None     Active member of club or organization: None     Attends meetings of clubs or organizations: None     Relationship status: None     Intimate partner violence:  "    Fear of current or ex partner: None     Emotionally abused: None     Physically abused: None     Forced sexual activity: None   Other Topics Concern     Parent/sibling w/ CABG, MI or angioplasty before 65F 55M? Not Asked   Social History Narrative     None       Review of Systems:  Skin:  Negative     Eyes:  Positive for glasses(reading only)  ENT:  Negative    Respiratory:  Positive for sleep apnea;CPAP;dyspnea on exertion  Cardiovascular:  palpitations;chest pain;syncope or near-syncope;cyanosis;lightheadedness;dizziness;Negative for Positive for;edema;exercise intolerance;fatigue  Gastroenterology: Negative for hematochezia;melena  Genitourinary:  Positive for urinary frequency  Musculoskeletal:  Negative    Neurologic:  Negative    Psychiatric:  Negative    Heme/Lymph/Imm:  Negative    Endocrine:  Negative      Physical Exam:  Vitals: BP (!) 133/91   Pulse 85   Ht 1.702 m (5' 7\")   Wt 132.9 kg (293 lb)   BMI 45.89 kg/m       Constitutional:  cooperative, alert and oriented, well developed, well nourished, in no acute distress obese      Skin:  warm and dry to the touch, no apparent skin lesions or masses noted        Head:  normocephalic        Eyes:  pupils equal and round;conjunctivae and lids unremarkable;sclera white        ENT:  no pallor or cyanosis, dentition good        Neck:  no carotid bruit JVP 10-12;hepatojugular reflux;JVP 8-10      Chest:  normal breath sounds, clear to auscultation, normal A-P diameter, normal symmetry, normal respiratory excursion, no use of accessory muscles        Cardiac: no murmurs, gallops or rubs detected irregularly irregular rhythm                Abdomen:  non-tender;BS normoactive obese      Vascular: pulses full and equal                                      Extremities and Back:      improved    Neurological:  no gross motor deficits          Recent Lab Results:  LIPID RESULTS:  Lab Results   Component Value Date    CHOL 193 02/25/2019    HDL 45 02/25/2019    "  (H) 02/25/2019    TRIG 165 (H) 02/25/2019    CHOLHDLRATIO 3.8 03/16/2015       LIVER ENZYME RESULTS:  Lab Results   Component Value Date    AST 22 03/12/2019    ALT 40 03/12/2019       CBC RESULTS:  Lab Results   Component Value Date    WBC 10.5 11/06/2017    RBC 4.55 11/06/2017    HGB 16.2 11/06/2017    HCT 46.9 11/06/2017     (H) 11/06/2017    MCH 35.6 (H) 11/06/2017    MCHC 34.5 11/06/2017    RDW 13.3 11/06/2017     11/06/2017       A1C RESULTS:  Lab Results   Component Value Date    A1C 5.7 (H) 02/25/2019                   Thank you for allowing me to participate in the care of your patient.      Sincerely,     Natalie Campos PA-C     [facility]    cc:   Natalie Campos PA-C  [address]  [place] [address]

## 2019-03-28 NOTE — PROGRESS NOTES
HPI:   I had the pleasure of seeing Brayden when he came for follow up of atrial fibrillation.  He is a 68 year old who sees Dr. Day for his history of:     1. Atrial Fibrillation diagnosed in 2012, treated with flecainide and DCCV with success until 12/2016. He then developed fatigue/lack of energy and fluid retention and was back in AFib 12/2016.  He underwent PVI and SVC and CTI ablation 11/2017 with good success.  Unfortunately, back in AFib 1/2019. He was placed on amiodarone with attempt x 3 at [facility] on 2/26, which were unsuccessful in restoring sinus rhythm. We are currently working on a short-term rate control strategy while he gets his risk factors under control, with plans for rhythm control ASAP.  His repeat ablation is scheduled 4/12/19 with Dr. Day  2. Preserved EF based on echo 10/2017 and EF 51% on cMRI 10/2017  3. Benign Essential HTN   4. BOWEN  5. Myocardiac Scar noted on cardiac MRI 10/2017, consistent with prior MI in the left Cx territory. EF 51%.  Avoiding flecainide use  6. Alcohol use (up to 10 mini-bottles alcohol/day), and had not been drinking at his last appointment with me 3/12, but is subsequently started again.         I last saw Brayden 3/12.  At that time, he was doing quite a bit better.  He had not had any alcohol since 3/4, had diuresed tremendously on Lasix with a 10 pound weight loss.  Blood pressure was under good control.  He was still in atrial fibrillation.  I had him discontinue amiodarone, which was Dr. Day's recommendation after his failed cardioversion 2/27.    Since then, his weight is down another 4 pounds, but unfortunately he has started drinking again.  He knows that this is an issue for him, and is previously talked to Dr. Kay about Alcoholics Anonymous.    He still thinks that his breathing is quite a bit better, without complaints of orthopnea or PND.  His edema is about the same.  He denies any problems of chest pain, pressure or tightness.  He remains unaware of  his atrial fibrillation with the exception of fluid overload, which is been his typical symptom.    Echocardiogram 3/2019 showed EF of 55-60%.  No regional wall motion abnormalities were noted.  He had 1+ mitral regurgitation and 1+ tricuspid regurgitation.  RV was normal in structure, function and size.  Aortic valve showed just trace to mild aortic regurgitation with aortic sclerosis.  Mean gradient was 4.6 mmHg (improved from previous) volume index is 46.7 mL/m  with LA dimension of 4.9 cm.    Cardiac MRI 10/2017 showed an EF of 51% with mild hypokinesis involving the basal inferolateral segment.  Evidence of previous myocardial infarction in the circumflex territory was noted.  Component      Latest Ref Rng & Units 2/25/2019 3/12/2019 3/28/2019   Sodium      136 - 145 mmol/L 141 141 140   Potassium      3.5 - 5.1 mmol/L 4.1 4.0 3.3 (L)   Chloride      98 - 107 mmol/L 105 105 100   Carbon Dioxide      23 - 29 mmol/L 32 28 32 (H)   Anion Gap      6 - 17 mmol/L 4 12 11.3   Glucose      70 - 105 mg/dL 129 (H) 100 107 (H)   Urea Nitrogen      7 - 30 mg/dL 22 21 22   Creatinine      0.70 - 1.30 mg/dL 1.24 1.24 1.27   GFR Estimate      >60 mL/min/1.73:m2 60 (L) 58 (L) 57 (L)   GFR Estimate If Black      >60 mL/min/1.73:m2 69 70 68   Calcium      8.5 - 10.5 mg/dL 8.4 (L) 9.1 9.1       Assessment & Plan:    1.  Persistent atrial fibrillation with preserved EF, complicated by fluid overload    As above, underwent PVI 11/2017.  Back in [facility] 1/2019 and had developed heart failure with shortness of breath, edema and weight gain.  These are his typical symptoms    Continues on Xarelto without interruption.  Last LFTs looked good 3/2019    Heart rate remains controlled on EKG    PLAN:    Plan for repeat ablation 4/12/19 with Dr. Day.      We discussed the risks, benefits and indications of proceeding with an electrophysiology study and pulmonary vein isolation/atrial fibrillation ablation, including but not limited to use  of anesthesia (including intubation and bladder catheter), peripheral vessel injury, discomfort, bruising, bleeding, esophageal injury, diaphragmatic injury, cardiac puncture and/or tamponade requiring emergency treatment, pulmonary vein stenosis requiring intervention, and stroke/TIA. We reviewed that additional procedures may be required.  We also briefly discussed post-procedural restrictions and post-procedural discomfort.  The patient voiced understanding and is willing to proceed.  A consent form will be signed by the procedural physician.      He will continue Xarelto without interruption.    His MRA from 10/2017 will be used for mapping.  He has remained on Xarelto without interruption so will not require RAMIRO.      2.  Heart failure with preserved ejection fraction    Likely exacerbated by recurrent atrial fibrillation and alcohol use.  EF on echo 3/2019 remains stable and normal at 55%    Currently on Lasix 40 mg daily and potassium chloride 20 mEq daily.  BMP as above shows hypokalemia, which is new    PLAN:    Continue Lasix 40 mg daily    Increase potassium to 30 mEq daily    Discontinue alcohol    3.  Benign essential hypertension    Continue losartan, furosemide and metoprolol    PLAN:    Discontinue alcohol      Crystal Campos PA-C, MSPAS      Orders Placed This Encounter   Procedures     EKG 12-lead complete w/read - Clinics (performed today)     Orders Placed This Encounter   Medications     potassium chloride ER (KLOR-CON) 10 MEQ CR tablet     Sig: Take 3 tablets (30 mEq) by mouth daily     Medications Discontinued During This Encounter   Medication Reason     potassium chloride ER (KLOR-CON) 10 MEQ CR tablet          Encounter Diagnoses   Name Primary?     Persistent atrial fibrillation (H) Yes     Peripheral edema        CURRENT MEDICATIONS:  Current Outpatient Medications   Medication Sig Dispense Refill     allopurinol (ZYLOPRIM) 300 MG tablet Take 1 tablet (300 mg) by mouth daily 90 tablet 3      amLODIPine (NORVASC) 2.5 MG tablet Take 1 tablet (2.5 mg) by mouth daily 90 tablet 3     finasteride (PROPECIA) 1 MG tablet Take 1 tablet (1 mg) by mouth daily 90 tablet 3     furosemide (LASIX) 20 MG tablet Take 2 tablets (40 mg) by mouth daily       LORazepam (ATIVAN) 0.5 MG tablet Take 1-2 tablets (0.5-1 mg) by mouth every 6 hours as needed for anxiety (alcohol withdrawal) 30 tablet 0     losartan (COZAAR) 100 MG tablet Take 1 tablet (100 mg) by mouth daily 90 tablet 3     metoprolol tartrate (LOPRESSOR) 50 MG tablet Take 1 tablet (50 mg) by mouth 2 times daily 180 tablet 3     order for DME Equipment ordered: RESMED CPAP Mask type: Full face  Settings: 15       potassium chloride ER (KLOR-CON) 10 MEQ CR tablet Take 3 tablets (30 mEq) by mouth daily       XARELTO 20 MG TABS tablet take 1 tablet (20mg) by mouth daily with dinner) avoid use of alcohol while using medication. 30 tablet 11       ALLERGIES     Allergies   Allergen Reactions     No Known Drug Allergies        PAST MEDICAL HISTORY:  Past Medical History:   Diagnosis Date     Essential hypertension, benign      H/O ETOH abuse      Herpes simplex without mention of complication      Hyperlipidemia      Obesity      Persistent atrial fibrillation (H)     persistent, DCCV 12/2017, ablation 11/6/17     Tobacco use disorder     dced 6/2004       PAST SURGICAL HISTORY:  Past Surgical History:   Procedure Laterality Date     ANESTHESIA CARDIOVERSION N/A 2/26/2019    Procedure: ANESTHESIA CARDIOVERSION (PRIETO);  Surgeon: GENERIC ANESTHESIA PROVIDER;  Location: [facility] OR     C NONSPECIFIC PROCEDURE  1997    achilles tendon     C NONSPECIFIC PROCEDURE      knees, arthroscopy     C NONSPECIFIC PROCEDURE      basal cell skin ca, nose     C NONSPECIFIC PROCEDURE      flex sig; colonoscopy due 3/2008     C NONSPECIFIC PROCEDURE  2012    cardioversion for atr fib     C TOTAL KNEE ARTHROPLASTY Left 03/16/2018    Dr. Malick Suarez     HERNIORRHAPHY UMBILICAL  8/20/2012     Procedure: HERNIORRHAPHY UMBILICAL;  UMBILICAL HERNIA REPAIR;  Surgeon: Ra Crocker MD;  Location: [facility]     ORTHOPEDIC SURGERY         FAMILY HISTORY:  Family History   Problem Relation Age of Onset     Family History Negative Mother      Heart Disease Father         decesased at 42 - MI     Heart Disease Sister          MI     Heart Disease Brother          MI     Heart Disease Brother         CABG AT 49     Lipids Sister      Lipids Sister      Lipids Sister      Family History Negative Brother        SOCIAL HISTORY:  Social History     Socioeconomic History     Marital status: Single     Spouse name: None     Number of children: None     Years of education: None     Highest education level: None   Occupational History     None   Social Needs     Financial resource strain: None     Food insecurity:     Worry: None     Inability: None     Transportation needs:     Medical: None     Non-medical: None   Tobacco Use     Smoking status: Former Smoker     Packs/day: 1.00     Years: 10.00     Pack years: 10.00     Types: Cigarettes     Start date:      Last attempt to quit: 2011     Years since quittin.6     Smokeless tobacco: Never Used   Substance and Sexual Activity     Alcohol use: No     Frequency: Never     Drug use: No     Sexual activity: Yes     Partners: Female   Lifestyle     Physical activity:     Days per week: None     Minutes per session: None     Stress: None   Relationships     Social connections:     Talks on phone: None     Gets together: None     Attends [employer] service: None     Active member of club or organization: None     Attends meetings of clubs or organizations: None     Relationship status: None     Intimate partner violence:     Fear of current or ex partner: None     Emotionally abused: None     Physically abused: None     Forced sexual activity: None   Other Topics Concern     Parent/sibling w/ CABG, MI or angioplasty before 65F 55M? Not Asked  "  Social History Narrative     None       Review of Systems:  Skin:  Negative     Eyes:  Positive for glasses(reading only)  ENT:  Negative    Respiratory:  Positive for sleep apnea;CPAP;dyspnea on exertion  Cardiovascular:  palpitations;chest pain;syncope or near-syncope;cyanosis;lightheadedness;dizziness;Negative for Positive for;edema;exercise intolerance;fatigue  Gastroenterology: Negative for hematochezia;melena  Genitourinary:  Positive for urinary frequency  Musculoskeletal:  Negative    Neurologic:  Negative    Psychiatric:  Negative    Heme/Lymph/Imm:  Negative    Endocrine:  Negative      Physical Exam:  Vitals: BP (!) 133/91   Pulse 85   Ht 1.702 m (5' 7\")   Wt 132.9 kg (293 lb)   BMI 45.89 kg/m      Constitutional:  cooperative, alert and oriented, well developed, well nourished, in no acute distress obese      Skin:  warm and dry to the touch, no apparent skin lesions or masses noted        Head:  normocephalic        Eyes:  pupils equal and round;conjunctivae and lids unremarkable;sclera white        ENT:  no pallor or cyanosis, dentition good        Neck:  no carotid bruit JVP 10-12;hepatojugular reflux;JVP 8-10      Chest:  normal breath sounds, clear to auscultation, normal A-P diameter, normal symmetry, normal respiratory excursion, no use of accessory muscles        Cardiac: no murmurs, gallops or rubs detected irregularly irregular rhythm                Abdomen:  non-tender;BS normoactive obese      Vascular: pulses full and equal                                      Extremities and Back:      improved    Neurological:  no gross motor deficits          Recent Lab Results:  LIPID RESULTS:  Lab Results   Component Value Date    CHOL 193 02/25/2019    HDL 45 02/25/2019     (H) 02/25/2019    TRIG 165 (H) 02/25/2019    CHOLHDLRATIO 3.8 03/16/2015       LIVER ENZYME RESULTS:  Lab Results   Component Value Date    AST 22 03/12/2019    ALT 40 03/12/2019       CBC RESULTS:  Lab Results "   Component Value Date    WBC 10.5 11/06/2017    RBC 4.55 11/06/2017    HGB 16.2 11/06/2017    HCT 46.9 11/06/2017     (H) 11/06/2017    MCH 35.6 (H) 11/06/2017    MCHC 34.5 11/06/2017    RDW 13.3 11/06/2017     11/06/2017       A1C RESULTS:  Lab Results   Component Value Date    A1C 5.7 (H) 02/25/2019

## 2019-03-28 NOTE — PATIENT INSTRUCTIONS
1. EKG today showed continue arrhythmias but heart rate controlled   Discussed proceeding with repeat AFib ablation scheduled for 4/12/2019.    Arrive at 6:30 AM - nothing to eat/drink after midnight.   Take all meds with small sip of water EXCEPT furosemide and the potassium    2. Blood work today showed low potassium. As discussed, continue furosemide 40 mg (2 of the 20 mg tabs) and INCREASE the potassium to THREE (30 mEq) daily    3. STOP ALCOHOL BEFORE THE PROCEDURE!  No drinking afterwards, either, given increase risk of more arrhythmias    [phone] (Mervat Hidalgo, Caitlin)

## 2019-03-28 NOTE — LETTER
3/28/2019    Johnny Kay MD  [address] [facility] [address]    RE: Mike Schultz       Dear Colleague,    I had the pleasure of seeing Mike BRAXTON Schultz in the [facility].    HPI:   I had the pleasure of seeing Brayden when he came for follow up of atrial fibrillation.  He is a 68 year old who sees Dr. Day for his history of:     1. Atrial Fibrillation diagnosed in 2012, treated with flecainide and DCCV with success until 12/2016. He then developed fatigue/lack of energy and fluid retention and was back in AFib 12/2016.  He underwent PVI and SVC and CTI ablation 11/2017 with good success.  Unfortunately, back in AFib 1/2019. He was placed on amiodarone with attempt x 3 at DCCV on 2/26, which were unsuccessful in restoring sinus rhythm. We are currently working on a short-term rate control strategy while he gets his risk factors under control, with plans for rhythm control ASAP.  His repeat ablation is scheduled 4/12/19 with Dr. Day  2. Preserved EF based on echo 10/2017 and EF 51% on cMRI 10/2017  3. Benign Essential HTN   4. BOWEN  5. Myocardiac Scar noted on cardiac MRI 10/2017, consistent with prior MI in the left Cx territory. EF 51%.  Avoiding flecainide use  6. Alcohol use (up to 10 mini-bottles alcohol/day), and had not been drinking at his last appointment with me 3/12, but is subsequently started again.         I last saw Brayden 3/12.  At that time, he was doing quite a bit better.  He had not had any alcohol since 3/4, had diuresed tremendously on Lasix with a 10 pound weight loss.  Blood pressure was under good control.  He was still in atrial fibrillation.  I had him discontinue amiodarone, which was Dr. Day's recommendation after his failed cardioversion 2/27.    Since then, his weight is down another 4 pounds, but unfortunately he has started drinking again.  He knows that this is an issue for him, and is previously talked to Dr. Kay about Alcoholics  Anonymous.    He still thinks that his breathing is quite a bit better, without complaints of orthopnea or PND.  His edema is about the same.  He denies any problems of chest pain, pressure or tightness.  He remains unaware of his atrial fibrillation with the exception of fluid overload, which is been his typical symptom.    Echocardiogram 3/2019 showed EF of 55-60%.  No regional wall motion abnormalities were noted.  He had 1+ mitral regurgitation and 1+ tricuspid regurgitation.  RV was normal in structure, function and size.  Aortic valve showed just trace to mild aortic regurgitation with aortic sclerosis.  Mean gradient was 4.6 mmHg (improved from previous) volume index is 46.7 mL/m  with LA dimension of 4.9 cm.    Cardiac MRI 10/2017 showed an EF of 51% with mild hypokinesis involving the basal inferolateral segment.  Evidence of previous myocardial infarction in the circumflex territory was noted.  Component      Latest Ref Rng & Units 2/25/2019 3/12/2019 3/28/2019   Sodium      136 - 145 mmol/L 141 141 140   Potassium      3.5 - 5.1 mmol/L 4.1 4.0 3.3 (L)   Chloride      98 - 107 mmol/L 105 105 100   Carbon Dioxide      23 - 29 mmol/L 32 28 32 (H)   Anion Gap      6 - 17 mmol/L 4 12 11.3   Glucose      70 - 105 mg/dL 129 (H) 100 107 (H)   Urea Nitrogen      7 - 30 mg/dL 22 21 22   Creatinine      0.70 - 1.30 mg/dL 1.24 1.24 1.27   GFR Estimate      >60 mL/min/1.73:m2 60 (L) 58 (L) 57 (L)   GFR Estimate If Black      >60 mL/min/1.73:m2 69 70 68   Calcium      8.5 - 10.5 mg/dL 8.4 (L) 9.1 9.1       Assessment & Plan:    1.  Persistent atrial fibrillation with preserved EF, complicated by fluid overload    As above, underwent PVI 11/2017.  Back in A. fib 1/2019 and had developed heart failure with shortness of breath, edema and weight gain.  These are his typical symptoms    Continues on Xarelto without interruption.  Last LFTs looked good 3/2019    Heart rate remains controlled on EKG    PLAN:    Plan for  repeat ablation 4/12/19 with Dr. Day.      We discussed the risks, benefits and indications of proceeding with an electrophysiology study and pulmonary vein isolation/atrial fibrillation ablation, including but not limited to use of anesthesia (including intubation and bladder catheter), peripheral vessel injury, discomfort, bruising, bleeding, esophageal injury, diaphragmatic injury, cardiac puncture and/or tamponade requiring emergency treatment, pulmonary vein stenosis requiring intervention, and stroke/TIA. We reviewed that additional procedures may be required.  We also briefly discussed post-procedural restrictions and post-procedural discomfort.  The patient voiced understanding and is willing to proceed.  A consent form will be signed by the procedural physician.      He will continue Xarelto without interruption.    His MRA from 10/2017 will be used for mapping.  He has remained on Xarelto without interruption so will not require RAMIOR.      2.  Heart failure with preserved ejection fraction    Likely exacerbated by recurrent atrial fibrillation and alcohol use.  EF on echo 3/2019 remains stable and normal at 55%    Currently on Lasix 40 mg daily and potassium chloride 20 mEq daily.  BMP as above shows hypokalemia, which is new    PLAN:    Continue Lasix 40 mg daily    Increase potassium to 30 mEq daily    Discontinue alcohol    3.  Benign essential hypertension    Continue losartan, furosemide and metoprolol    PLAN:    Discontinue alcohol      Crystal Campos PA-C, MSPAS      Orders Placed This Encounter   Procedures     EKG 12-lead complete w/read - Clinics (performed today)     Orders Placed This Encounter   Medications     potassium chloride ER (KLOR-CON) 10 MEQ CR tablet     Sig: Take 3 tablets (30 mEq) by mouth daily     Medications Discontinued During This Encounter   Medication Reason     potassium chloride ER (KLOR-CON) 10 MEQ CR tablet          Encounter Diagnoses   Name Primary?     Persistent atrial  fibrillation (H) Yes     Peripheral edema        CURRENT MEDICATIONS:  Current Outpatient Medications   Medication Sig Dispense Refill     allopurinol (ZYLOPRIM) 300 MG tablet Take 1 tablet (300 mg) by mouth daily 90 tablet 3     amLODIPine (NORVASC) 2.5 MG tablet Take 1 tablet (2.5 mg) by mouth daily 90 tablet 3     finasteride (PROPECIA) 1 MG tablet Take 1 tablet (1 mg) by mouth daily 90 tablet 3     furosemide (LASIX) 20 MG tablet Take 2 tablets (40 mg) by mouth daily       LORazepam (ATIVAN) 0.5 MG tablet Take 1-2 tablets (0.5-1 mg) by mouth every 6 hours as needed for anxiety (alcohol withdrawal) 30 tablet 0     losartan (COZAAR) 100 MG tablet Take 1 tablet (100 mg) by mouth daily 90 tablet 3     metoprolol tartrate (LOPRESSOR) 50 MG tablet Take 1 tablet (50 mg) by mouth 2 times daily 180 tablet 3     order for DME Equipment ordered: RESMED CPAP Mask type: Full face  Settings: 15       potassium chloride ER (KLOR-CON) 10 MEQ CR tablet Take 3 tablets (30 mEq) by mouth daily       XARELTO 20 MG TABS tablet take 1 tablet (20mg) by mouth daily with dinner) avoid use of alcohol while using medication. 30 tablet 11       ALLERGIES     Allergies   Allergen Reactions     No Known Drug Allergies        PAST MEDICAL HISTORY:  Past Medical History:   Diagnosis Date     Essential hypertension, benign      H/O ETOH abuse      Herpes simplex without mention of complication      Hyperlipidemia      Obesity      Persistent atrial fibrillation (H)     persistent, DCCV 12/2017, ablation 11/6/17     Tobacco use disorder     dced 6/2004       PAST SURGICAL HISTORY:  Past Surgical History:   Procedure Laterality Date     ANESTHESIA CARDIOVERSION N/A 2/26/2019    Procedure: ANESTHESIA CARDIOVERSION (PRIETO);  Surgeon: GENERIC ANESTHESIA PROVIDER;  Location: [facility] OR     C NONSPECIFIC PROCEDURE  1997    achilles tendon     C NONSPECIFIC PROCEDURE      knees, arthroscopy     C NONSPECIFIC PROCEDURE      basal cell skin ca, nose     C  NONSPECIFIC PROCEDURE      flex sig; colonoscopy due 3/2008     C NONSPECIFIC PROCEDURE      cardioversion for atr fib     C TOTAL KNEE ARTHROPLASTY Left 2018    Dr. Malick Suarez     HERNIORRHAPHY UMBILICAL  2012    Procedure: HERNIORRHAPHY UMBILICAL;  UMBILICAL HERNIA REPAIR;  Surgeon: Ra Crocker MD;  Location: [facility]     ORTHOPEDIC SURGERY         FAMILY HISTORY:  Family History   Problem Relation Age of Onset     Family History Negative Mother      Heart Disease Father         decesased at 42 - MI     Heart Disease Sister          MI     Heart Disease Brother          MI     Heart Disease Brother         CABG AT 49     Lipids Sister      Lipids Sister      Lipids Sister      Family History Negative Brother        SOCIAL HISTORY:  Social History     Socioeconomic History     Marital status: Single     Spouse name: None     Number of children: None     Years of education: None     Highest education level: None   Occupational History     None   Social Needs     Financial resource strain: None     Food insecurity:     Worry: None     Inability: None     Transportation needs:     Medical: None     Non-medical: None   Tobacco Use     Smoking status: Former Smoker     Packs/day: 1.00     Years: 10.00     Pack years: 10.00     Types: Cigarettes     Start date:      Last attempt to quit: 2011     Years since quittin.6     Smokeless tobacco: Never Used   Substance and Sexual Activity     Alcohol use: No     Frequency: Never     Drug use: No     Sexual activity: Yes     Partners: Female   Lifestyle     Physical activity:     Days per week: None     Minutes per session: None     Stress: None   Relationships     Social connections:     Talks on phone: None     Gets together: None     Attends Zoroastrianism service: None     Active member of club or organization: None     Attends meetings of clubs or organizations: None     Relationship status: None     Intimate partner violence:  "    Fear of current or ex partner: None     Emotionally abused: None     Physically abused: None     Forced sexual activity: None   Other Topics Concern     Parent/sibling w/ CABG, MI or angioplasty before 65F 55M? Not Asked   Social History Narrative     None       Review of Systems:  Skin:  Negative     Eyes:  Positive for glasses(reading only)  ENT:  Negative    Respiratory:  Positive for sleep apnea;CPAP;dyspnea on exertion  Cardiovascular:  palpitations;chest pain;syncope or near-syncope;cyanosis;lightheadedness;dizziness;Negative for Positive for;edema;exercise intolerance;fatigue  Gastroenterology: Negative for hematochezia;melena  Genitourinary:  Positive for urinary frequency  Musculoskeletal:  Negative    Neurologic:  Negative    Psychiatric:  Negative    Heme/Lymph/Imm:  Negative    Endocrine:  Negative      Physical Exam:  Vitals: BP (!) 133/91   Pulse 85   Ht 1.702 m (5' 7\")   Wt 132.9 kg (293 lb)   BMI 45.89 kg/m       Constitutional:  cooperative, alert and oriented, well developed, well nourished, in no acute distress obese      Skin:  warm and dry to the touch, no apparent skin lesions or masses noted        Head:  normocephalic        Eyes:  pupils equal and round;conjunctivae and lids unremarkable;sclera white        ENT:  no pallor or cyanosis, dentition good        Neck:  no carotid bruit JVP 10-12;hepatojugular reflux;JVP 8-10      Chest:  normal breath sounds, clear to auscultation, normal A-P diameter, normal symmetry, normal respiratory excursion, no use of accessory muscles        Cardiac: no murmurs, gallops or rubs detected irregularly irregular rhythm                Abdomen:  non-tender;BS normoactive obese      Vascular: pulses full and equal                                      Extremities and Back:      improved    Neurological:  no gross motor deficits          Recent Lab Results:  LIPID RESULTS:  Lab Results   Component Value Date    CHOL 193 02/25/2019    HDL 45 02/25/2019    "  (H) 02/25/2019    TRIG 165 (H) 02/25/2019    CHOLHDLRATIO 3.8 03/16/2015       LIVER ENZYME RESULTS:  Lab Results   Component Value Date    AST 22 03/12/2019    ALT 40 03/12/2019       CBC RESULTS:  Lab Results   Component Value Date    WBC 10.5 11/06/2017    RBC 4.55 11/06/2017    HGB 16.2 11/06/2017    HCT 46.9 11/06/2017     (H) 11/06/2017    MCH 35.6 (H) 11/06/2017    MCHC 34.5 11/06/2017    RDW 13.3 11/06/2017     11/06/2017       A1C RESULTS:  Lab Results   Component Value Date    A1C 5.7 (H) 02/25/2019           Thank you for allowing me to participate in the care of your patient.    Sincerely,     Natalie Campos PA-C     [facility]

## 2019-04-07 DIAGNOSIS — R60.0 PERIPHERAL EDEMA: ICD-10-CM

## 2019-04-07 NOTE — TELEPHONE ENCOUNTER
"Requested Prescriptions   Pending Prescriptions Disp Refills     potassium chloride ER (K-TAB/KLOR-CON) 10 MEQ CR tablet [Pharmacy Med Name: Potassium Chloride ER Oral Tablet Extended Release 10 MEQ] 180 tablet 0     Sig: TAKE TWO TABLETS BY MOUTH DAILY   Last Written Prescription Date:  3/28/2019  Last Fill Quantity: -,  # refills: -   Last office visit: 2/27/2019 with prescribing provider:  2/27/2019   Future Office Visit:        Potassium Supplements Protocol Failed - 4/7/2019  9:23 AM        Failed - Medication is active on med list        Failed - Normal serum potassium in past 12 months     Recent Labs   Lab Test 03/28/19  1404   POTASSIUM 3.3*                    Passed - Recent (12 mo) or future (30 days) visit within the authorizing provider's specialty     Patient had office visit in the last 12 months or has a visit in the next 30 days with authorizing provider or within the authorizing provider's specialty.  See \"Patient Info\" tab in inbasket, or \"Choose Columns\" in Meds & Orders section of the refill encounter.              Passed - Patient is age 18 or older          "

## 2019-04-10 RX ORDER — POTASSIUM CHLORIDE 750 MG/1
30 TABLET, EXTENDED RELEASE ORAL DAILY
Qty: 270 TABLET | Refills: 1 | Status: ON HOLD | OUTPATIENT
Start: 2019-04-10 | End: 2019-04-12

## 2019-04-10 NOTE — TELEPHONE ENCOUNTER
Potassium on 3/28 was low, and dose was increased to 3 daily by cardiology.   Will give this prescription and need repeat BMP in another week or two.    I'm very concerned about his alcoholism.   Please discuss with him, and see if he is willing to address this currently.   Cutting down is not a reasonable options, which it is unlikely that he will be at all successful with.   See if he is willing to go through treatment.

## 2019-04-10 NOTE — TELEPHONE ENCOUNTER
Patient informed. He says cardiology had increased his potassium between last appt and ablation coming up on Friday. Do you want him to continue to take 3 daily until gets advisement on repeat labs? He will call back to make lab appt.    Also says he has pretty much stopped alcohol. Not wanting options for treatment. Says he will do his best to stop it completely.

## 2019-04-11 ENCOUNTER — TELEPHONE (OUTPATIENT)
Dept: CARDIOLOGY | Facility: CLINIC | Age: 69
End: 2019-04-11

## 2019-04-11 RX ORDER — LIDOCAINE 40 MG/G
CREAM TOPICAL
Status: CANCELLED | OUTPATIENT
Start: 2019-04-11

## 2019-04-11 RX ORDER — SODIUM CHLORIDE 450 MG/100ML
INJECTION, SOLUTION INTRAVENOUS CONTINUOUS
Status: CANCELLED | OUTPATIENT
Start: 2019-04-11

## 2019-04-11 NOTE — TELEPHONE ENCOUNTER
04/11/19 Pt called and discussed instructions for Afib Ablation scheduled for tomorrow. Informed of 6:30 am arrival time, NPO after midnight with sips of water w AM meds. He is to hold Lasix and Potassium. Wife is driving him in . Pt to stay overnight and stated he has a  to bring him home Saturday.[place] 2:10pm

## 2019-04-12 ENCOUNTER — HOSPITAL ENCOUNTER (OUTPATIENT)
Facility: CLINIC | Age: 69
Discharge: HOME | End: 2019-04-13
Admitting: INTERNAL MEDICINE
Payer: COMMERCIAL

## 2019-04-12 ENCOUNTER — ANESTHESIA EVENT (OUTPATIENT)
Dept: CARDIOLOGY | Facility: CLINIC | Age: 69
End: 2019-04-12
Payer: COMMERCIAL

## 2019-04-12 ENCOUNTER — SURGERY (OUTPATIENT)
Age: 69
End: 2019-04-12
Payer: COMMERCIAL

## 2019-04-12 ENCOUNTER — ANESTHESIA (OUTPATIENT)
Dept: CARDIOLOGY | Facility: CLINIC | Age: 69
End: 2019-04-12
Payer: COMMERCIAL

## 2019-04-12 DIAGNOSIS — K21.9 GASTROESOPHAGEAL REFLUX DISEASE, ESOPHAGITIS PRESENCE NOT SPECIFIED: Primary | ICD-10-CM

## 2019-04-12 DIAGNOSIS — I48.19 PERSISTENT ATRIAL FIBRILLATION (H): ICD-10-CM

## 2019-04-12 PROBLEM — I48.20 CHRONIC A-FIB (H): Status: ACTIVE | Noted: 2019-04-12

## 2019-04-12 LAB
ANION GAP SERPL CALCULATED.3IONS-SCNC: 6 MMOL/L (ref 3–14)
BUN SERPL-MCNC: 23 MG/DL (ref 7–30)
CALCIUM SERPL-MCNC: 8.3 MG/DL (ref 8.5–10.1)
CHLORIDE SERPL-SCNC: 106 MMOL/L (ref 94–109)
CO2 SERPL-SCNC: 28 MMOL/L (ref 20–32)
CREAT SERPL-MCNC: 1.21 MG/DL (ref 0.66–1.25)
ERYTHROCYTE [DISTWIDTH] IN BLOOD BY AUTOMATED COUNT: 13.2 % (ref 10–15)
GFR SERPL CREATININE-BSD FRML MDRD: 61 ML/MIN/{1.73_M2}
GLUCOSE SERPL-MCNC: 111 MG/DL (ref 70–99)
HCT VFR BLD AUTO: 48.2 % (ref 40–53)
HGB BLD-MCNC: 16.4 G/DL (ref 13.3–17.7)
KCT BLD-ACNC: 156 SEC (ref 75–150)
KCT BLD-ACNC: 290 SEC (ref 75–150)
KCT BLD-ACNC: 347 SEC (ref 75–150)
MCH RBC QN AUTO: 35.4 PG (ref 26.5–33)
MCHC RBC AUTO-ENTMCNC: 34 G/DL (ref 31.5–36.5)
MCV RBC AUTO: 104 FL (ref 78–100)
PLATELET # BLD AUTO: 193 10E9/L (ref 150–450)
POTASSIUM SERPL-SCNC: 3.7 MMOL/L (ref 3.4–5.3)
RBC # BLD AUTO: 4.63 10E12/L (ref 4.4–5.9)
SODIUM SERPL-SCNC: 140 MMOL/L (ref 133–144)
WBC # BLD AUTO: 7.1 10E9/L (ref 4–11)

## 2019-04-12 PROCEDURE — 25800029 ZZH RX IP 258 OP 250: Performed by: INTERNAL MEDICINE

## 2019-04-12 PROCEDURE — C1730 CATH, EP, 19 OR FEW ELECT: HCPCS | Performed by: INTERNAL MEDICINE

## 2019-04-12 PROCEDURE — 85027 COMPLETE CBC AUTOMATED: CPT | Performed by: INTERNAL MEDICINE

## 2019-04-12 PROCEDURE — 40000934 ZZH STATISTIC OUTPATIENT (NON-OBS) DAY

## 2019-04-12 PROCEDURE — 93662 INTRACARDIAC ECG (ICE): CPT | Mod: 26 | Performed by: INTERNAL MEDICINE

## 2019-04-12 PROCEDURE — 93657 TX L/R ATRIAL FIB ADDL: CPT | Performed by: INTERNAL MEDICINE

## 2019-04-12 PROCEDURE — 25000128 H RX IP 250 OP 636: Performed by: NURSE ANESTHETIST, CERTIFIED REGISTERED

## 2019-04-12 PROCEDURE — 25000566 ZZH SEVOFLURANE, EA 15 MIN: Performed by: INTERNAL MEDICINE

## 2019-04-12 PROCEDURE — C1888 ENDOVAS NON-CARDIAC ABL CATH: HCPCS | Performed by: INTERNAL MEDICINE

## 2019-04-12 PROCEDURE — 80048 BASIC METABOLIC PNL TOTAL CA: CPT | Performed by: INTERNAL MEDICINE

## 2019-04-12 PROCEDURE — C1894 INTRO/SHEATH, NON-LASER: HCPCS | Performed by: INTERNAL MEDICINE

## 2019-04-12 PROCEDURE — 93613 INTRACARDIAC EPHYS 3D MAPG: CPT | Performed by: INTERNAL MEDICINE

## 2019-04-12 PROCEDURE — 25000125 ZZHC RX 250: Performed by: INTERNAL MEDICINE

## 2019-04-12 PROCEDURE — 25000132 ZZH RX MED GY IP 250 OP 250 PS 637: Performed by: NURSE ANESTHETIST, CERTIFIED REGISTERED

## 2019-04-12 PROCEDURE — 93005 ELECTROCARDIOGRAM TRACING: CPT

## 2019-04-12 PROCEDURE — 40000935 ZZH STATISTIC OUTPATIENT (NON-OBS) EVE

## 2019-04-12 PROCEDURE — 93010 ELECTROCARDIOGRAM REPORT: CPT | Performed by: INTERNAL MEDICINE

## 2019-04-12 PROCEDURE — 40000936 ZZH STATISTIC OUTPATIENT (NON-OBS) NIGHT

## 2019-04-12 PROCEDURE — 85347 COAGULATION TIME ACTIVATED: CPT

## 2019-04-12 PROCEDURE — 25800030 ZZH RX IP 258 OP 636: Performed by: NURSE ANESTHETIST, CERTIFIED REGISTERED

## 2019-04-12 PROCEDURE — 36415 COLL VENOUS BLD VENIPUNCTURE: CPT | Performed by: INTERNAL MEDICINE

## 2019-04-12 PROCEDURE — 25000125 ZZHC RX 250: Performed by: NURSE ANESTHETIST, CERTIFIED REGISTERED

## 2019-04-12 PROCEDURE — 25000128 H RX IP 250 OP 636: Performed by: INTERNAL MEDICINE

## 2019-04-12 PROCEDURE — 27210794 ZZH OR GENERAL SUPPLY STERILE: Performed by: INTERNAL MEDICINE

## 2019-04-12 PROCEDURE — 40000852 ZZH STATISTIC HEART CATH LAB OR EP LAB

## 2019-04-12 PROCEDURE — 93655 ICAR CATH ABLTJ DSCRT ARRHYT: CPT | Performed by: INTERNAL MEDICINE

## 2019-04-12 PROCEDURE — 25000132 ZZH RX MED GY IP 250 OP 250 PS 637: Performed by: INTERNAL MEDICINE

## 2019-04-12 PROCEDURE — C1759 CATH, INTRA ECHOCARDIOGRAPHY: HCPCS | Performed by: INTERNAL MEDICINE

## 2019-04-12 PROCEDURE — 37000008 ZZH ANESTHESIA TECHNICAL FEE, 1ST 30 MIN: Performed by: INTERNAL MEDICINE

## 2019-04-12 PROCEDURE — 37000009 ZZH ANESTHESIA TECHNICAL FEE, EACH ADDTL 15 MIN: Performed by: INTERNAL MEDICINE

## 2019-04-12 PROCEDURE — 93662 INTRACARDIAC ECG (ICE): CPT | Performed by: INTERNAL MEDICINE

## 2019-04-12 PROCEDURE — 93656 COMPRE EP EVAL ABLTJ ATR FIB: CPT | Performed by: INTERNAL MEDICINE

## 2019-04-12 PROCEDURE — 93621 COMP EP EVL L PAC&REC C SINS: CPT | Performed by: INTERNAL MEDICINE

## 2019-04-12 RX ORDER — NALOXONE HYDROCHLORIDE 0.4 MG/ML
.1-.4 INJECTION, SOLUTION INTRAMUSCULAR; INTRAVENOUS; SUBCUTANEOUS
Status: DISCONTINUED | OUTPATIENT
Start: 2019-04-12 | End: 2019-04-12

## 2019-04-12 RX ORDER — LIDOCAINE HYDROCHLORIDE 20 MG/ML
INJECTION, SOLUTION INFILTRATION; PERINEURAL PRN
Status: DISCONTINUED | OUTPATIENT
Start: 2019-04-12 | End: 2019-04-12

## 2019-04-12 RX ORDER — ONDANSETRON 4 MG/1
4 TABLET, ORALLY DISINTEGRATING ORAL EVERY 30 MIN PRN
Status: DISCONTINUED | OUTPATIENT
Start: 2019-04-12 | End: 2019-04-12 | Stop reason: HOSPADM

## 2019-04-12 RX ORDER — NALOXONE HYDROCHLORIDE 0.4 MG/ML
.1-.4 INJECTION, SOLUTION INTRAMUSCULAR; INTRAVENOUS; SUBCUTANEOUS
Status: DISCONTINUED | OUTPATIENT
Start: 2019-04-12 | End: 2019-04-13 | Stop reason: HOSPADM

## 2019-04-12 RX ORDER — ONDANSETRON 2 MG/ML
INJECTION INTRAMUSCULAR; INTRAVENOUS PRN
Status: DISCONTINUED | OUTPATIENT
Start: 2019-04-12 | End: 2019-04-12

## 2019-04-12 RX ORDER — POTASSIUM CHLORIDE 600 MG/1
30 TABLET, FILM COATED, EXTENDED RELEASE ORAL DAILY
Status: DISCONTINUED | OUTPATIENT
Start: 2019-04-12 | End: 2019-04-12

## 2019-04-12 RX ORDER — ALBUTEROL SULFATE 90 UG/1
AEROSOL, METERED RESPIRATORY (INHALATION) PRN
Status: DISCONTINUED | OUTPATIENT
Start: 2019-04-12 | End: 2019-04-12

## 2019-04-12 RX ORDER — HYDROMORPHONE HYDROCHLORIDE 1 MG/ML
.3-.5 INJECTION, SOLUTION INTRAMUSCULAR; INTRAVENOUS; SUBCUTANEOUS EVERY 5 MIN PRN
Status: DISCONTINUED | OUTPATIENT
Start: 2019-04-12 | End: 2019-04-12 | Stop reason: HOSPADM

## 2019-04-12 RX ORDER — LIDOCAINE 40 MG/G
CREAM TOPICAL
Status: DISCONTINUED | OUTPATIENT
Start: 2019-04-12 | End: 2019-04-12 | Stop reason: HOSPADM

## 2019-04-12 RX ORDER — FENTANYL CITRATE 50 UG/ML
INJECTION, SOLUTION INTRAMUSCULAR; INTRAVENOUS PRN
Status: DISCONTINUED | OUTPATIENT
Start: 2019-04-12 | End: 2019-04-12

## 2019-04-12 RX ORDER — DOBUTAMINE HYDROCHLORIDE 200 MG/100ML
5-40 INJECTION INTRAVENOUS CONTINUOUS PRN
Status: DISCONTINUED | OUTPATIENT
Start: 2019-04-12 | End: 2019-04-12 | Stop reason: HOSPADM

## 2019-04-12 RX ORDER — NEOSTIGMINE METHYLSULFATE 1 MG/ML
VIAL (ML) INJECTION PRN
Status: DISCONTINUED | OUTPATIENT
Start: 2019-04-12 | End: 2019-04-12

## 2019-04-12 RX ORDER — ALLOPURINOL 300 MG/1
300 TABLET ORAL DAILY
Status: DISCONTINUED | OUTPATIENT
Start: 2019-04-13 | End: 2019-04-13 | Stop reason: HOSPADM

## 2019-04-12 RX ORDER — OXYCODONE AND ACETAMINOPHEN 5; 325 MG/1; MG/1
1 TABLET ORAL EVERY 4 HOURS PRN
Status: DISCONTINUED | OUTPATIENT
Start: 2019-04-12 | End: 2019-04-13 | Stop reason: HOSPADM

## 2019-04-12 RX ORDER — ALBUTEROL SULFATE 0.83 MG/ML
2.5 SOLUTION RESPIRATORY (INHALATION) EVERY 4 HOURS PRN
Status: DISCONTINUED | OUTPATIENT
Start: 2019-04-12 | End: 2019-04-12 | Stop reason: HOSPADM

## 2019-04-12 RX ORDER — PROPOFOL 10 MG/ML
INJECTION, EMULSION INTRAVENOUS PRN
Status: DISCONTINUED | OUTPATIENT
Start: 2019-04-12 | End: 2019-04-12

## 2019-04-12 RX ORDER — SODIUM CHLORIDE 450 MG/100ML
INJECTION, SOLUTION INTRAVENOUS CONTINUOUS
Status: DISCONTINUED | OUTPATIENT
Start: 2019-04-12 | End: 2019-04-12 | Stop reason: HOSPADM

## 2019-04-12 RX ORDER — AMLODIPINE BESYLATE 2.5 MG/1
2.5 TABLET ORAL DAILY
Status: DISCONTINUED | OUTPATIENT
Start: 2019-04-13 | End: 2019-04-13 | Stop reason: HOSPADM

## 2019-04-12 RX ORDER — EPHEDRINE SULFATE 50 MG/ML
INJECTION, SOLUTION INTRAMUSCULAR; INTRAVENOUS; SUBCUTANEOUS PRN
Status: DISCONTINUED | OUTPATIENT
Start: 2019-04-12 | End: 2019-04-12

## 2019-04-12 RX ORDER — HYDRALAZINE HYDROCHLORIDE 20 MG/ML
2.5-5 INJECTION INTRAMUSCULAR; INTRAVENOUS EVERY 10 MIN PRN
Status: DISCONTINUED | OUTPATIENT
Start: 2019-04-12 | End: 2019-04-12 | Stop reason: HOSPADM

## 2019-04-12 RX ORDER — ONDANSETRON 2 MG/ML
4 INJECTION INTRAMUSCULAR; INTRAVENOUS EVERY 30 MIN PRN
Status: DISCONTINUED | OUTPATIENT
Start: 2019-04-12 | End: 2019-04-12 | Stop reason: HOSPADM

## 2019-04-12 RX ORDER — HEPARIN SODIUM 1000 [USP'U]/ML
INJECTION, SOLUTION INTRAVENOUS; SUBCUTANEOUS
Status: DISCONTINUED | OUTPATIENT
Start: 2019-04-12 | End: 2019-04-12 | Stop reason: HOSPADM

## 2019-04-12 RX ORDER — PROTAMINE SULFATE 10 MG/ML
INJECTION, SOLUTION INTRAVENOUS
Status: DISCONTINUED | OUTPATIENT
Start: 2019-04-12 | End: 2019-04-12 | Stop reason: HOSPADM

## 2019-04-12 RX ORDER — LORAZEPAM 0.5 MG/1
.5-1 TABLET ORAL EVERY 6 HOURS PRN
Status: DISCONTINUED | OUTPATIENT
Start: 2019-04-12 | End: 2019-04-13 | Stop reason: HOSPADM

## 2019-04-12 RX ORDER — FINASTERIDE 1 MG/1
1 TABLET, FILM COATED ORAL DAILY
Status: DISCONTINUED | OUTPATIENT
Start: 2019-04-13 | End: 2019-04-13 | Stop reason: HOSPADM

## 2019-04-12 RX ORDER — LOSARTAN POTASSIUM 100 MG/1
100 TABLET ORAL DAILY
Status: DISCONTINUED | OUTPATIENT
Start: 2019-04-13 | End: 2019-04-13 | Stop reason: HOSPADM

## 2019-04-12 RX ORDER — FUROSEMIDE 10 MG/ML
INJECTION INTRAMUSCULAR; INTRAVENOUS
Status: DISCONTINUED | OUTPATIENT
Start: 2019-04-12 | End: 2019-04-12 | Stop reason: HOSPADM

## 2019-04-12 RX ORDER — SODIUM CHLORIDE, SODIUM LACTATE, POTASSIUM CHLORIDE, CALCIUM CHLORIDE 600; 310; 30; 20 MG/100ML; MG/100ML; MG/100ML; MG/100ML
INJECTION, SOLUTION INTRAVENOUS CONTINUOUS
Status: DISCONTINUED | OUTPATIENT
Start: 2019-04-12 | End: 2019-04-12 | Stop reason: HOSPADM

## 2019-04-12 RX ORDER — LIDOCAINE 40 MG/G
CREAM TOPICAL
Status: DISCONTINUED | OUTPATIENT
Start: 2019-04-12 | End: 2019-04-13 | Stop reason: HOSPADM

## 2019-04-12 RX ORDER — FUROSEMIDE 40 MG
40 TABLET ORAL DAILY
Status: DISCONTINUED | OUTPATIENT
Start: 2019-04-12 | End: 2019-04-13 | Stop reason: HOSPADM

## 2019-04-12 RX ORDER — FENTANYL CITRATE 50 UG/ML
25-50 INJECTION, SOLUTION INTRAMUSCULAR; INTRAVENOUS
Status: DISCONTINUED | OUTPATIENT
Start: 2019-04-12 | End: 2019-04-12 | Stop reason: HOSPADM

## 2019-04-12 RX ORDER — DEXAMETHASONE SODIUM PHOSPHATE 4 MG/ML
INJECTION, SOLUTION INTRA-ARTICULAR; INTRALESIONAL; INTRAMUSCULAR; INTRAVENOUS; SOFT TISSUE PRN
Status: DISCONTINUED | OUTPATIENT
Start: 2019-04-12 | End: 2019-04-12

## 2019-04-12 RX ORDER — SODIUM CHLORIDE, SODIUM LACTATE, POTASSIUM CHLORIDE, CALCIUM CHLORIDE 600; 310; 30; 20 MG/100ML; MG/100ML; MG/100ML; MG/100ML
INJECTION, SOLUTION INTRAVENOUS CONTINUOUS PRN
Status: DISCONTINUED | OUTPATIENT
Start: 2019-04-12 | End: 2019-04-12

## 2019-04-12 RX ORDER — MEPERIDINE HYDROCHLORIDE 25 MG/ML
12.5 INJECTION INTRAMUSCULAR; INTRAVENOUS; SUBCUTANEOUS EVERY 5 MIN PRN
Status: DISCONTINUED | OUTPATIENT
Start: 2019-04-12 | End: 2019-04-12 | Stop reason: HOSPADM

## 2019-04-12 RX ORDER — GLYCOPYRROLATE 0.2 MG/ML
INJECTION, SOLUTION INTRAMUSCULAR; INTRAVENOUS PRN
Status: DISCONTINUED | OUTPATIENT
Start: 2019-04-12 | End: 2019-04-12

## 2019-04-12 RX ADMIN — Medication 5 MG: at 08:54

## 2019-04-12 RX ADMIN — PHENYLEPHRINE HYDROCHLORIDE 100 MCG: 10 INJECTION, SOLUTION INTRAMUSCULAR; INTRAVENOUS; SUBCUTANEOUS at 08:50

## 2019-04-12 RX ADMIN — LIDOCAINE HYDROCHLORIDE 10 ML: 10 INJECTION, SOLUTION EPIDURAL; INFILTRATION; INTRACAUDAL; PERINEURAL at 09:02

## 2019-04-12 RX ADMIN — HEPARIN SODIUM 10000 UNITS: 1000 INJECTION, SOLUTION INTRAVENOUS; SUBCUTANEOUS at 09:18

## 2019-04-12 RX ADMIN — SODIUM CHLORIDE: 4.5 INJECTION, SOLUTION INTRAVENOUS at 07:22

## 2019-04-12 RX ADMIN — DEXMEDETOMIDINE HYDROCHLORIDE 0.4 MCG/KG/HR: 100 INJECTION, SOLUTION INTRAVENOUS at 08:56

## 2019-04-12 RX ADMIN — ROCURONIUM BROMIDE 20 MG: 10 INJECTION INTRAVENOUS at 09:00

## 2019-04-12 RX ADMIN — Medication 5 MG: at 09:07

## 2019-04-12 RX ADMIN — SODIUM CHLORIDE, POTASSIUM CHLORIDE, SODIUM LACTATE AND CALCIUM CHLORIDE: 600; 310; 30; 20 INJECTION, SOLUTION INTRAVENOUS at 10:54

## 2019-04-12 RX ADMIN — MIDAZOLAM 1 MG: 1 INJECTION INTRAMUSCULAR; INTRAVENOUS at 09:30

## 2019-04-12 RX ADMIN — PROTAMINE SULFATE 60 MG: 10 INJECTION, SOLUTION INTRAVENOUS at 10:47

## 2019-04-12 RX ADMIN — HEPARIN SODIUM 7000 UNITS: 1000 INJECTION, SOLUTION INTRAVENOUS; SUBCUTANEOUS at 09:36

## 2019-04-12 RX ADMIN — HEPARIN SODIUM 200 ML/HR: 200 INJECTION, SOLUTION INTRAVENOUS at 09:18

## 2019-04-12 RX ADMIN — PROPOFOL 250 MG: 10 INJECTION, EMULSION INTRAVENOUS at 08:48

## 2019-04-12 RX ADMIN — PHENYLEPHRINE HYDROCHLORIDE 0.25 MCG/KG/MIN: 10 INJECTION, SOLUTION INTRAMUSCULAR; INTRAVENOUS; SUBCUTANEOUS at 08:54

## 2019-04-12 RX ADMIN — FUROSEMIDE 40 MG: 10 INJECTION, SOLUTION INTRAVENOUS at 10:48

## 2019-04-12 RX ADMIN — MIDAZOLAM 5 MG: 1 INJECTION INTRAMUSCULAR; INTRAVENOUS at 08:39

## 2019-04-12 RX ADMIN — OXYCODONE HYDROCHLORIDE AND ACETAMINOPHEN 1 TABLET: 5; 325 TABLET ORAL at 23:40

## 2019-04-12 RX ADMIN — LIDOCAINE HYDROCHLORIDE 100 MG: 20 INJECTION, SOLUTION INFILTRATION; PERINEURAL at 08:48

## 2019-04-12 RX ADMIN — HEPARIN SODIUM 200 ML/HR: 200 INJECTION, SOLUTION INTRAVENOUS at 09:20

## 2019-04-12 RX ADMIN — GLYCOPYRROLATE 0.8 MG: 0.2 INJECTION, SOLUTION INTRAMUSCULAR; INTRAVENOUS at 10:58

## 2019-04-12 RX ADMIN — LIDOCAINE HYDROCHLORIDE 8 ML: 10 INJECTION, SOLUTION EPIDURAL; INFILTRATION; INTRACAUDAL; PERINEURAL at 09:01

## 2019-04-12 RX ADMIN — ONDANSETRON 4 MG: 2 INJECTION INTRAMUSCULAR; INTRAVENOUS at 10:55

## 2019-04-12 RX ADMIN — PHENYLEPHRINE HYDROCHLORIDE 100 MCG: 10 INJECTION, SOLUTION INTRAMUSCULAR; INTRAVENOUS; SUBCUTANEOUS at 08:54

## 2019-04-12 RX ADMIN — DEXAMETHASONE SODIUM PHOSPHATE 4 MG: 4 INJECTION, SOLUTION INTRA-ARTICULAR; INTRALESIONAL; INTRAMUSCULAR; INTRAVENOUS; SOFT TISSUE at 09:00

## 2019-04-12 RX ADMIN — ALBUTEROL SULFATE 6 PUFF: 90 AEROSOL, METERED RESPIRATORY (INHALATION) at 11:05

## 2019-04-12 RX ADMIN — FENTANYL CITRATE 100 MCG: 50 INJECTION, SOLUTION INTRAMUSCULAR; INTRAVENOUS at 08:48

## 2019-04-12 RX ADMIN — Medication 5 MG: at 08:58

## 2019-04-12 RX ADMIN — Medication 5 MG: at 10:58

## 2019-04-12 RX ADMIN — SUCCINYLCHOLINE CHLORIDE 140 MG: 20 INJECTION, SOLUTION INTRAMUSCULAR; INTRAVENOUS; PARENTERAL at 08:48

## 2019-04-12 ASSESSMENT — ACTIVITIES OF DAILY LIVING (ADL)
BATHING: 0-->INDEPENDENT
RETIRED_EATING: 0-->INDEPENDENT
NUMBER_OF_TIMES_PATIENT_HAS_FALLEN_WITHIN_LAST_SIX_MONTHS: 1
RETIRED_COMMUNICATION: 0-->UNDERSTANDS/COMMUNICATES WITHOUT DIFFICULTY
COGNITION: 0 - NO COGNITION ISSUES REPORTED
TOILETING: 0-->INDEPENDENT
FALL_HISTORY_WITHIN_LAST_SIX_MONTHS: YES
AMBULATION: 0-->INDEPENDENT
TRANSFERRING: 0-->INDEPENDENT
SWALLOWING: 0-->SWALLOWS FOODS/LIQUIDS WITHOUT DIFFICULTY
DRESS: 0-->INDEPENDENT

## 2019-04-12 ASSESSMENT — ENCOUNTER SYMPTOMS: DYSRHYTHMIAS: 1

## 2019-04-12 ASSESSMENT — MIFFLIN-ST. JEOR: SCORE: 2078.54

## 2019-04-12 NOTE — Clinical Note
Arrhythmia Type: atrial fibrillation.   Method of Cardioversion: synchronous.   The arrhythmia was terminated.   Energy shock delivered: 200 joules.   Time shock delivered: 09:26.   Post cardioversion rhythm: sinus rhythm.

## 2019-04-12 NOTE — ANESTHESIA PREPROCEDURE EVALUATION
Anesthesia Pre-Procedure Evaluation    Patient: Mike Schultz   MRN: [id number] : 1950          Preoperative Diagnosis: recurrent atrial fibrillation    Procedure(s):  EP Ablation Focal AFIB    Past Medical History:   Diagnosis Date     Essential hypertension, benign      H/O ETOH abuse      Herpes simplex without mention of complication      Hyperlipidemia      Obesity      Persistent atrial fibrillation (H)     persistent, DCCV 2017, ablation 17     Tobacco use disorder     dced 2004     Past Surgical History:   Procedure Laterality Date     ANESTHESIA CARDIOVERSION N/A 2019    Procedure: ANESTHESIA CARDIOVERSION (PRIETO);  Surgeon: GENERIC ANESTHESIA PROVIDER;  Location:  OR     C NONSPECIFIC PROCEDURE      achilles tendon     C NONSPECIFIC PROCEDURE      knees, arthroscopy     C NONSPECIFIC PROCEDURE      basal cell skin ca, nose     C NONSPECIFIC PROCEDURE      flex sig; colonoscopy due 3/2008     C NONSPECIFIC PROCEDURE      cardioversion for atr fib     C TOTAL KNEE ARTHROPLASTY Left 2018    Dr. Malick Suarez     HERNIORRHAPHY UMBILICAL  2012    Procedure: HERNIORRHAPHY UMBILICAL;  UMBILICAL HERNIA REPAIR;  Surgeon: Ra Crocker MD;  Location: [facility]     ORTHOPEDIC SURGERY         Anesthesia Evaluation     .             ROS/MED HX    ENT/Pulmonary:     (+)sleep apnea, , . .    Neurologic:       Cardiovascular:     (+) Dyslipidemia, hypertension----. : . . . :. dysrhythmias a-fib, .       METS/Exercise Tolerance:     Hematologic:         Musculoskeletal:         GI/Hepatic:        (-) GERD   Renal/Genitourinary:         Endo:     (+) Obesity, .      Psychiatric:         Infectious Disease:         Malignancy:         Other:                                 Lab Results   Component Value Date    WBC 10.5 2017    HGB 16.2 2017    HCT 46.9 2017     2017    CRP <2.9 2015     2019    POTASSIUM 3.3 (L) 2019     "CHLORIDE 100 03/28/2019    CO2 32 (H) 03/28/2019    BUN 22 03/28/2019    CR 1.27 03/28/2019     (H) 03/28/2019    ASHELY 9.1 03/28/2019    MAG 1.8 02/26/2019    ALBUMIN 3.7 03/12/2019    PROTTOTAL 7.4 03/12/2019    ALT 40 03/12/2019    AST 22 03/12/2019    ALKPHOS 82 03/12/2019    BILITOTAL 0.7 03/12/2019    INR 2.04 (H) 02/26/2019    TSH 3.80 10/12/2017       Preop Vitals  BP Readings from Last 3 Encounters:   03/28/19 (!) 133/91   03/12/19 128/88   03/04/19 (!) 165/108    Pulse Readings from Last 3 Encounters:   03/28/19 85   03/12/19 68   03/04/19 76      Resp Readings from Last 3 Encounters:   02/26/19 16   04/06/18 18   03/02/18 18    SpO2 Readings from Last 3 Encounters:   02/27/19 94%   02/26/19 96%   02/11/19 94%      Temp Readings from Last 1 Encounters:   02/27/19 36.6  C (97.9  F) (Oral)    Ht Readings from Last 1 Encounters:   03/28/19 1.702 m (5' 7\")      Wt Readings from Last 1 Encounters:   03/28/19 132.9 kg (293 lb)    Estimated body mass index is 45.89 kg/m  as calculated from the following:    Height as of 3/28/19: 1.702 m (5' 7\").    Weight as of 3/28/19: 132.9 kg (293 lb).       Anesthesia Plan      History & Physical Review  History and physical reviewed and following examination; no interval change.    ASA Status:  3 .    NPO Status:  > 8 hours    Plan for General and ETT with Intravenous induction. Maintenance will be Balanced.    PONV prophylaxis:  Ondansetron (or other 5HT-3) and Dexamethasone or Solumedrol  Additional equipment: Videolaryngoscope      Postoperative Care  Postoperative pain management:  IV analgesics and Oral pain medications.      Consents  Anesthetic plan, risks, benefits and alternatives discussed with:  Patient..                 Lubna Dong MD, MD  "

## 2019-04-12 NOTE — ANESTHESIA POSTPROCEDURE EVALUATION
Patient: Mike Schultz    Procedure(s):  EP Ablation Focal AFIB    Diagnosis:recurrent atrial fibrillation  Diagnosis Additional Information: No value filed.    Anesthesia Type:  General, ETT    Note:  Anesthesia Post Evaluation    Patient location during evaluation: PACU  Patient participation: Able to fully participate in evaluation  Level of consciousness: awake  Pain management: adequate  Airway patency: patent  Cardiovascular status: acceptable  Respiratory status: acceptable  Hydration status: acceptable  PONV: none     Anesthetic complications: None          Last vitals:  Vitals:    04/12/19 1230 04/12/19 1240 04/12/19 1250   BP: 124/74 116/75    Pulse: 63 59    Resp: 21 16 12   Temp:      SpO2: 94% 94% 94%         Electronically Signed By: Lubna Dong MD, MD  April 12, 2019  2:03 PM

## 2019-04-12 NOTE — PLAN OF CARE
AOx4, up with SBA. Ambulated x1. Advanced to regular diet, tolerating well. VSS, weaned to 2 L NC, on capno. Denies pain. CPAP at night. Palmer removed at 1700, due to void. Bilateral groin sites- L site CDI R site small amount of drainage marked, has not extended borders. Neuro checks intact. L MARKUS SL'd. Tele NSR. Discharge date pending.

## 2019-04-12 NOTE — ANESTHESIA CARE TRANSFER NOTE
Patient: Mike Schultz    Procedure(s):  EP Ablation Focal AFIB    Diagnosis: recurrent atrial fibrillation  Diagnosis Additional Information: No value filed.    Anesthesia Type:   General, ETT     Note:  Airway :Face Mask  Patient transferred to:PACU  Comments: To pacu. Vss. Denies pain. Report given to RN assuming care of pt. Denies pain or nvHandoff Report: Identifed the Patient, Identified the Reponsible Provider, Reviewed the pertinent medical history, Discussed the surgical course, Reviewed Intra-OP anesthesia mangement and issues during anesthesia, Set expectations for post-procedure period and Allowed opportunity for questions and acknowledgement of understanding      Vitals: (Last set prior to Anesthesia Care Transfer)    CRNA VITALS  4/12/2019 1048 - 4/12/2019 1129      4/12/2019             SpO2:  94 %    Resp Rate (set):  10                Electronically Signed By: AYAN Myles CRNA  April 12, 2019  11:29 AM

## 2019-04-12 NOTE — OR NURSING
Pt report given and cares now handed off to Merline MUELLER RN.  Bilat groins sites are stable, VSS. Pt awake and coherent.

## 2019-04-12 NOTE — PROGRESS NOTES
Uneventful re-isolation of all reconnected 4 PVs. Typical right sided atrial flutter was induced and successful terminated with ablation with isthmus block demonstrated. Pt was noted to have mild pericardial effusion (<1 cm) even before obtaining LA access. Effusion not changed post procedure. Stop metoprolol due to sinus tom in the 40s. Resume Xarelto.

## 2019-04-13 VITALS
HEIGHT: 68 IN | SYSTOLIC BLOOD PRESSURE: 136 MMHG | WEIGHT: 298.6 LBS | DIASTOLIC BLOOD PRESSURE: 78 MMHG | TEMPERATURE: 96 F | BODY MASS INDEX: 45.25 KG/M2 | HEART RATE: 59 BPM | OXYGEN SATURATION: 94 % | RESPIRATION RATE: 18 BRPM

## 2019-04-13 PROCEDURE — 99213 OFFICE O/P EST LOW 20 MIN: CPT | Performed by: INTERNAL MEDICINE

## 2019-04-13 PROCEDURE — 25000128 H RX IP 250 OP 636: Performed by: INTERNAL MEDICINE

## 2019-04-13 PROCEDURE — 40000934 ZZH STATISTIC OUTPATIENT (NON-OBS) DAY

## 2019-04-13 PROCEDURE — 25000132 ZZH RX MED GY IP 250 OP 250 PS 637: Performed by: INTERNAL MEDICINE

## 2019-04-13 RX ORDER — PANTOPRAZOLE SODIUM 40 MG/1
40 TABLET, DELAYED RELEASE ORAL DAILY
Qty: 30 TABLET | Refills: 0 | Status: SHIPPED | OUTPATIENT
Start: 2019-04-13 | End: 2019-07-08

## 2019-04-13 RX ORDER — FUROSEMIDE 10 MG/ML
10 INJECTION INTRAMUSCULAR; INTRAVENOUS ONCE
Status: COMPLETED | OUTPATIENT
Start: 2019-04-13 | End: 2019-04-13

## 2019-04-13 RX ADMIN — FUROSEMIDE 40 MG: 40 TABLET ORAL at 08:12

## 2019-04-13 RX ADMIN — FUROSEMIDE 10 MG: 10 INJECTION, SOLUTION INTRAVENOUS at 10:18

## 2019-04-13 RX ADMIN — LOSARTAN POTASSIUM 100 MG: 100 TABLET, FILM COATED ORAL at 08:12

## 2019-04-13 RX ADMIN — AMLODIPINE BESYLATE 2.5 MG: 2.5 TABLET ORAL at 08:11

## 2019-04-13 RX ADMIN — ALLOPURINOL 300 MG: 300 TABLET ORAL at 08:12

## 2019-04-13 ASSESSMENT — MIFFLIN-ST. JEOR: SCORE: 2103.94

## 2019-04-13 NOTE — PLAN OF CARE
A&Ox4. VSS on RA. Independent. Regular diet. Tele SB/SR. Bilateral groin sites CDI except R groin site scant drainage, marked, unchanged, no signs of hematoma both sites, non tender. CMS intact. IV removed. Denies pain, n/t, nausea, SOB, dizziness, lightheadedness. AVS and discharge med script explained and given to pt at discharge, educated pt about watching for signs of infection @ groin sites, all questions answered, 5 med rights used. Pt filling medication at pharmacy of choice. Belongings taken w/. Pt declined w/c ride off unit, ambulated, wife providing ride home for pt.

## 2019-04-13 NOTE — PLAN OF CARE
A&Ox4. VSS on RA. bilat groin sites. Small amount dried drainage R site, unchanged this shift. Tele: SR. Pain controlled w/ percocet x1. Pulses 2+. Voiding adequately w/o difficulty. Up independently. CPAP overnight. IV SL. Continue to monitor.

## 2019-04-13 NOTE — PROGRESS NOTES
"EP- Cardiology Progress Note           Assessment and Plan:      67 yo M with Hx of HTN and PAF on (PVI done 2 years ago), who failed therapy with Amio and underwent redo A. Fib/ typical flutter ablation yesterday.    He is hemodynamic stable.  Groins are CDI.    Plan:  1.  PAF.  Status post ablation.  He is in NSR. We will resume beta-blockers and start PPI.  Follow-up in clinic in 1 week.  2.  Embolic prevention.  Chads vas score of 2.  Continue Xarelto indefinitely.  3.  Hypertension.  Blood pressure is well controlled.  Continue therapy with amlodipine, losartan, and metoprolol.    Of note, pt gain 5 pounds in the last 24 hrs.  We will give one dose of lasix IV prior to discontinue home.      Physical Exam:  Vitals: /78 (BP Location: Right arm)   Pulse 59   Temp 96  F (35.6  C) (Oral)   Resp 18   Ht 1.727 m (5' 8\")   Wt 135.4 kg (298 lb 9.6 oz)   SpO2 94%   BMI 45.40 kg/m        Intake/Output Summary (Last 24 hours) at 4/13/2019 0938  Last data filed at 4/12/2019 2213  Gross per 24 hour   Intake 1480 ml   Output 2675 ml   Net -1195 ml     Vitals:    04/12/19 0701 04/13/19 0805   Weight: 132.9 kg (293 lb) 135.4 kg (298 lb 9.6 oz)       Constitutional:  AAO x3.  Pt is in NAD.  HEAD: Normocephalic.  SKIN: Skin normal color, texture and turgor with no lesions or eruptions.  Eyes: PERRL, EOMI.  ENT:  Supple, normal JVP. No lymphadenopathy or thyroid enlargement.  Chest:  CTAB.  Cardiac:  RRR, normal  S1 and S2.  No murmurs rubs or gallop.   Abdomen:  Normal BS.  Soft, non-tender and non-distended.  No rebound or guarding.    Extremities:  Pedious pulses palpable B/L.  No LE edema noticed.   Neurological: Strength and sensation grossly symmetric and intact throughout.                            Review of Systems:   As per subjective, otherwise 5 systems reviewed and negative.         Medications:          allopurinol  300 mg Oral Daily     amLODIPine  2.5 mg Oral Daily     finasteride  1 mg Oral Daily     " furosemide  40 mg Oral Daily     losartan  100 mg Oral Daily     potassium chloride ER  30 mEq Oral Daily     rivaroxaban ANTICOAGULANT  20 mg Oral Daily with supper     sodium chloride (PF)  3 mL Intracatheter Q8H     PRN Meds: lidocaine 4%, lidocaine (buffered or not buffered), LORazepam, naloxone, oxyCODONE-acetaminophen, sodium chloride (PF)             Data:     Recent Labs   Lab 04/12/19  0707   WBC 7.1   HGB 16.4   *         POTASSIUM 3.7   CHLORIDE 106   CO2 28   BUN 23   CR 1.21   ANIONGAP 6   ASHELY 8.3*   *

## 2019-04-15 ENCOUNTER — TELEPHONE (OUTPATIENT)
Dept: CARDIOLOGY | Facility: CLINIC | Age: 69
End: 2019-04-15

## 2019-04-15 NOTE — TELEPHONE ENCOUNTER
"F/U call made to pt regarding Afib/Typical Flutter ablation done on 04/12/19.  He reports he \"feels good\". Groins C/D/I after removing bandage and washing at home. Pt states he is afebrile and up around the house w/o difficulty. States he is eating, drinking and using BR without diff. Denies any pain. Reminded of F/u appt inés [facility]pos on 04/19/19 at 9 am and enc to call with any questions or concerns before then. Byron 04/15/19 11:28am  "

## 2019-04-17 LAB — INTERPRETATION ECG - MUSE: NORMAL

## 2019-04-19 ENCOUNTER — OFFICE VISIT (OUTPATIENT)
Dept: CARDIOLOGY | Facility: CLINIC | Age: 69
End: 2019-04-19
Payer: COMMERCIAL

## 2019-04-19 VITALS
DIASTOLIC BLOOD PRESSURE: 90 MMHG | SYSTOLIC BLOOD PRESSURE: 146 MMHG | WEIGHT: 294 LBS | HEART RATE: 54 BPM | HEIGHT: 67 IN | BODY MASS INDEX: 46.15 KG/M2

## 2019-04-19 DIAGNOSIS — I48.19 PERSISTENT ATRIAL FIBRILLATION (H): Primary | ICD-10-CM

## 2019-04-19 DIAGNOSIS — I77.810 ASCENDING AORTA DILATATION (H): ICD-10-CM

## 2019-04-19 DIAGNOSIS — I35.0 NONRHEUMATIC AORTIC VALVE STENOSIS: ICD-10-CM

## 2019-04-19 PROCEDURE — 93000 ELECTROCARDIOGRAM COMPLETE: CPT | Performed by: PHYSICIAN ASSISTANT

## 2019-04-19 PROCEDURE — 99214 OFFICE O/P EST MOD 30 MIN: CPT | Mod: 25 | Performed by: PHYSICIAN ASSISTANT

## 2019-04-19 RX ORDER — METOPROLOL TARTRATE 50 MG
25 TABLET ORAL 2 TIMES DAILY
COMMUNITY
Start: 2019-04-19 | End: 2019-12-26

## 2019-04-19 ASSESSMENT — MIFFLIN-ST. JEOR: SCORE: 2067.21

## 2019-04-19 NOTE — PATIENT INSTRUCTIONS
1. EKG today looks great! Normal rhythm, just a little slow!    2. DECREASE metoprolol tartrate 25 mg twice daily as pulse doesn't need to be in the 50s.    3. Move Xarelto to suppertime.  It should be taken with the largest meal of the day. Take tonight!    4. No alcohol!!! Keep up the good work!  NOT EVEN ONE (slippery slope!)    5. Stay on the Lasix 40 mg (2 of the 20 mg tab) and potassium 30 mEq (3 of the 10 mEq tabs).  Continue this until 275# then call and we'll see if we can back off on the Lasix and potassium.      6. My nurses are Mally Hidalgo: [phone]    7. Call if any issues - irregular rhythm x 2 hours or more, concerns about dehydration, etc!  Otherwise, see Dr. Day 6/2019    8. Keep track of BP.    Yes...

## 2019-04-19 NOTE — PROGRESS NOTES
HPI:   I had the pleasure of seeing Brayden when he came for follow up of recent repeat PVI.  He is a 68 year old who sees Dr. Day for his history of:    1. Atrial Fibrillation diagnosed in 2012, treated with flecainide and DCCV with success until 12/2016. He then developed fatigue/lack of energy and fluid retention and was back in AFib 12/2016.  He underwent PVI and SVC and CTI ablation 11/2017 with good success.  Unfortunately, back in AFib 1/2019. He was placed on amiodarone with attempt x 3 at [facility] on 2/26, which were unsuccessful in restoring sinus rhythm. We are currently working on a short-term rate control strategy while he gets his risk factors under control, with plans for rhythm control ASAP.  Underwent repeat PVI 4/12/19 with Dr. Day  2. Preserved EF based on echo 10/2017 and EF 51% on cMRI 10/2017  3. Benign Essential HTN   4. BOWEN  5. Myocardiac Scar noted on cardiac MRI 10/2017, consistent with prior MI in the left Cx territory. EF 51%.  Avoiding flecainide use  6. Alcohol use (up to 10 mini-bottles alcohol/day), and had not been drinking at his last appointment with me 3/12, but is subsequently started again.  Has not had any alcohol since 1 week before ablation 4/12/2019    Brayden underwent repeat PVI on 4/12/2019.  Dr. Day noted that he was in atrial fibrillation at baseline, and he underwent DC cardioversion.  He underwent reisolation of his for PVI's, which also had some element of all re- grown potentials.  He also underwent cavotricuspid isthmus ablation.    Dr. Day had indicated he should discontinue metoprolol tartrate 50 mg twice daily, but upon discharge this was not done.  He has remained on metoprolol tartrate 50 mg twice daily and Xarelto 20 mg (which he tells me he actually takes in the morning).    Since then, he has done well.  He does not think he said any recurrent atrial fibrillation.  He denies any problems with groin site trouble.  He has no bleeding issues on the Xarelto.  He  remains on Lasix 40 mg daily (2 of the 20 mg tablets).  With this, he does think that fluid is continuing to come off.  In the past, his symptom in atrial fibrillation was fluid retention despite a preserved ejection fraction.  He is hopeful to be able to back off on the Lasix as his heart continues to heal.    He has not had dizziness, lightheadedness or palpitations.  He denies any problems with current chest discomfort, but states for the first 48 to 72 hours after the procedure he did have some pleuritic chest discomfort.  He has had no trouble urinating post Palmer catheter removal.  He denies any fever/chills or trouble swallowing.    EKG today, which I overread, showed SB at 52 bpm with occasional PAC.  Nonspecific STTW changes noted.    Echocardiogram 3/2019 showed EF of 55-60%.  No regional wall motion abnormalities were noted.  He had 1+ mitral regurgitation and 1+ tricuspid regurgitation.  RV was normal in structure, function and size.  Aortic valve showed just trace to mild aortic regurgitation with aortic sclerosis.  Mean gradient was 4.6 mmHg (improved from previous) volume index is 46.7 mL/m  with LA dimension of 4.9 cm.     Cardiac MRI 10/2017 showed an EF of 51% with mild hypokinesis involving the basal inferolateral segment.  Evidence of previous myocardial infarction in the circumflex territory was noted.    Assessment & Plan:    1.  Persistent atrial fibrillation    As above, was noted to have regrowth of all 4 pulmonary veins, and underwent reisolation of this 4/12/2019.  He also had cavotricuspid isthmus ablation    His heart rate is low, and Dr. Day indicated that he should discontinue metoprolol upon discharge.  This was not done.  He continues to feel well.    He remains on Xarelto, but is not taking it at the right time    He remains abstaining from alcohol    PLAN:    Continue alcohol cessation he understands that he has trouble even having just 1 drink without having that turned into  more.  He has spoken with Dr. Kay about this in the past and knows how important it is to continue abstinence    Continue metoprolol, but decrease to 25 mg twice daily.  I hesitate to discontinue it entirely given his hypertension    Move Xarelto 20 mg to evening, explained that this should be taken with the largest meal of the day (supper)    See Dr. Day 6/2018    Contact us if he has arrhythmias lasting longer than 2 hours.      2.  Fluid overload    This is typically how he knows he is out of rhythm.  He has been diuresing well with Lasix.  Certainly abstaining from alcohol improves this    He still has quite a bit of fluid in the legs, but this is improving.  At home today, he was 290 pounds.    EF 51% on cardiac MRI 10/2017    PLAN:    Continue furosemide 40 mg daily and potassium chloride 30 mEq daily    Keep track of weights, and when he drops to 275#, contact me and we will likely decrease his furosemide.    He is to contact me earlier should he get any signs or concerns of dehydration    3.  Benign essential hypertension    Currently, he is on amlodipine 2.5 mg daily, Lasix 40 mg daily, losartan 100 mg daily and metoprolol tartrate 50 mg twice daily    Blood pressure came down nicely when he stopped drinking    He remains a bit fluid overloaded    PLAN:    As above, decrease metoprolol tartrate to 25 mg twice daily    Continue current medications, but could increase amlodipine to 5 mg daily if blood pressure remains elevated      rCystal Campos PA-C, MSPAS      Orders Placed This Encounter   Procedures     Follow-Up with Electrophysiologist     EKG 12-lead complete w/read - Clinics (performed today)     EKG 12-lead complete w/read - Clinics     Orders Placed This Encounter   Medications     metoprolol tartrate (LOPRESSOR) 50 MG tablet     Sig: Take 0.5 tablets (25 mg) by mouth 2 times daily     Medications Discontinued During This Encounter   Medication Reason     metoprolol tartrate (LOPRESSOR) 50 MG  tablet Reorder         Encounter Diagnoses   Name Primary?     Persistent atrial fibrillation (H) Yes     Aortic valve stenosis      Ascending aorta dilatation (H)        CURRENT MEDICATIONS:  Current Outpatient Medications   Medication Sig Dispense Refill     allopurinol (ZYLOPRIM) 300 MG tablet Take 1 tablet (300 mg) by mouth daily 90 tablet 3     amLODIPine (NORVASC) 2.5 MG tablet Take 1 tablet (2.5 mg) by mouth daily 90 tablet 3     finasteride (PROPECIA) 1 MG tablet Take 1 tablet (1 mg) by mouth daily 90 tablet 3     furosemide (LASIX) 20 MG tablet Take 2 tablets (40 mg) by mouth daily       LORazepam (ATIVAN) 0.5 MG tablet Take 1-2 tablets (0.5-1 mg) by mouth every 6 hours as needed for anxiety (alcohol withdrawal) 30 tablet 0     losartan (COZAAR) 100 MG tablet Take 1 tablet (100 mg) by mouth daily 90 tablet 3     metoprolol tartrate (LOPRESSOR) 50 MG tablet Take 0.5 tablets (25 mg) by mouth 2 times daily       pantoprazole (PROTONIX) 40 MG EC tablet Take 1 tablet (40 mg) by mouth daily 30 tablet 0     potassium chloride ER (KLOR-CON) 10 MEQ CR tablet Take 3 tablets (30 mEq) by mouth daily       XARELTO 20 MG TABS tablet take 1 tablet (20mg) by mouth daily with dinner) avoid use of alcohol while using medication. 30 tablet 11     order for DME Equipment ordered: RESMED CPAP Mask type: Full face  Settings: 15         ALLERGIES     Allergies   Allergen Reactions     No Known Drug Allergies        PAST MEDICAL HISTORY:  Past Medical History:   Diagnosis Date     Essential hypertension, benign      H/O ETOH abuse      Herpes simplex without mention of complication      Hyperlipidemia      Obesity      Persistent atrial fibrillation (H)     persistent, DCCV 12/2017, ablation 11/6/17     Tobacco use disorder     dced 6/2004       PAST SURGICAL HISTORY:  Past Surgical History:   Procedure Laterality Date     ANESTHESIA CARDIOVERSION N/A 2/26/2019    Procedure: ANESTHESIA CARDIOVERSION (PRIETO);  Surgeon: GENERIC  ANESTHESIA PROVIDER;  Location:  OR     C NONSPECIFIC PROCEDURE      achilles tendon     C NONSPECIFIC PROCEDURE      knees, arthroscopy     C NONSPECIFIC PROCEDURE      basal cell skin ca, nose     C NONSPECIFIC PROCEDURE      flex sig; colonoscopy due 3/2008     C NONSPECIFIC PROCEDURE      cardioversion for atr fib     C TOTAL KNEE ARTHROPLASTY Left 2018    Dr. Malick Suarez     EP ABLATION FOCAL AFIB N/A 2019    Procedure: EP Ablation Focal AFIB;  Surgeon: Fady Day MD;  Location:  [facility]     HERNIORRHAPHY UMBILICAL  2012    Procedure: HERNIORRHAPHY UMBILICAL;  UMBILICAL HERNIA REPAIR;  Surgeon: Ra Crocker MD;  Location: [facility]     ORTHOPEDIC SURGERY         FAMILY HISTORY:  Family History   Problem Relation Age of Onset     Family History Negative Mother      Heart Disease Father         decesased at 42 - MI     Heart Disease Sister          MI     Heart Disease Brother          MI     Heart Disease Brother         CABG AT 49     Lipids Sister      Lipids Sister      Lipids Sister      Family History Negative Brother        SOCIAL HISTORY:  Social History     Socioeconomic History     Marital status: Single     Spouse name: None     Number of children: None     Years of education: None     Highest education level: None   Occupational History     None   Social Needs     Financial resource strain: None     Food insecurity:     Worry: None     Inability: None     Transportation needs:     Medical: None     Non-medical: None   Tobacco Use     Smoking status: Former Smoker     Packs/day: 1.00     Years: 10.00     Pack years: 10.00     Types: Cigarettes     Start date:      Last attempt to quit: 2011     Years since quittin.6     Smokeless tobacco: Never Used   Substance and Sexual Activity     Alcohol use: No     Frequency: Never     Drug use: No     Sexual activity: Yes     Partners: Female   Lifestyle     Physical activity:      "Days per week: None     Minutes per session: None     Stress: None   Relationships     Social connections:     Talks on phone: None     Gets together: None     Attends Zoroastrian service: None     Active member of club or organization: None     Attends meetings of clubs or organizations: None     Relationship status: None     Intimate partner violence:     Fear of current or ex partner: None     Emotionally abused: None     Physically abused: None     Forced sexual activity: None   Other Topics Concern     Parent/sibling w/ CABG, MI or angioplasty before 65F 55M? Not Asked   Social History Narrative     None       Review of Systems:  Skin:  Negative     Eyes:  Positive for glasses(reading only)  ENT:  Negative    Respiratory:  Negative for sleep apnea;CPAP;dyspnea on exertion;shortness of breath  Cardiovascular:  palpitations;chest pain;syncope or near-syncope;cyanosis;lightheadedness;dizziness;Negative for Positive for;edema;exercise intolerance;fatigue  Gastroenterology: Negative for hematochezia;melena  Genitourinary:  Positive for urinary frequency  Musculoskeletal:  Negative    Neurologic:  Negative    Psychiatric:  Positive for excessive alcohol consumption  Heme/Lymph/Imm:  Negative    Endocrine:  Negative      Physical Exam:  Vitals: /90   Pulse 54   Ht 1.702 m (5' 7\")   Wt 133.4 kg (294 lb)   BMI 46.05 kg/m      Constitutional:  cooperative, alert and oriented, well developed, well nourished, in no acute distress obese      Skin:  warm and dry to the touch, no apparent skin lesions or masses noted        Head:  normocephalic        Eyes:  pupils equal and round;conjunctivae and lids unremarkable;sclera white        ENT:  no pallor or cyanosis, dentition good        Neck:  no carotid bruit JVP 8-10;JVP elevated      Chest:  normal breath sounds, clear to auscultation, normal A-P diameter, normal symmetry, normal respiratory excursion, no use of accessory muscles        Cardiac: no murmurs, gallops " or rubs detected;regular rhythm bradycardic                Abdomen:  non-tender;BS normoactive obese      Vascular: pulses full and equal                               right femoral bruit (-)      Extremities and Back:  no deformities, clubbing, cyanosis, erythema observed   improved    Neurological:  no gross motor deficits          Recent Lab Results:  LIPID RESULTS:  Lab Results   Component Value Date    CHOL 193 02/25/2019    HDL 45 02/25/2019     (H) 02/25/2019    TRIG 165 (H) 02/25/2019    CHOLHDLRATIO 3.8 03/16/2015       LIVER ENZYME RESULTS:  Lab Results   Component Value Date    AST 22 03/12/2019    ALT 40 03/12/2019       CBC RESULTS:  Lab Results   Component Value Date    WBC 7.1 04/12/2019    RBC 4.63 04/12/2019    HGB 16.4 04/12/2019    HCT 48.2 04/12/2019     (H) 04/12/2019    MCH 35.4 (H) 04/12/2019    MCHC 34.0 04/12/2019    RDW 13.2 04/12/2019     04/12/2019       BMP RESULTS:  Lab Results   Component Value Date     04/12/2019    POTASSIUM 3.7 04/12/2019    CHLORIDE 106 04/12/2019    CO2 28 04/12/2019    ANIONGAP 6 04/12/2019     (H) 04/12/2019    BUN 23 04/12/2019    CR 1.21 04/12/2019    GFRESTIMATED 61 04/12/2019    GFRESTBLACK 71 04/12/2019    ASHELY 8.3 (L) 04/12/2019        A1C RESULTS:  Lab Results   Component Value Date    A1C 5.7 (H) 02/25/2019       INR RESULTS:  Lab Results   Component Value Date    INR 2.04 (H) 02/26/2019    INR 1.66 (H) 11/06/2017

## 2019-04-19 NOTE — LETTER
4/19/2019    Johnny Kay MD  [address] [facility] [id number]    RE: Mike Schultz       Dear Colleague,    I had the pleasure of seeing Mike Schultz in the [facility].    HPI:   I had the pleasure of seeing Brayden when he came for follow up of recent repeat PVI.  He is a 68 year old who sees Dr. Day for his history of:    1. Atrial Fibrillation diagnosed in 2012, treated with flecainide and DCCV with success until 12/2016. He then developed fatigue/lack of energy and fluid retention and was back in AFib 12/2016.  He underwent PVI and SVC and CTI ablation 11/2017 with good success.  Unfortunately, back in AFib 1/2019. He was placed on amiodarone with attempt x 3 at DCCV on 2/26, which were unsuccessful in restoring sinus rhythm. We are currently working on a short-term rate control strategy while he gets his risk factors under control, with plans for rhythm control ASAP.   Underwent repeat PVI 4/12/19 with Dr. Day  2. Preserved EF based on echo 10/2017 and EF 51% on cMRI 10/2017  3. Benign Essential HTN   4. BOWEN  5. Myocardiac Scar noted on cardiac MRI 10/2017, consistent with prior MI in the left Cx territory. EF 51%.  Avoiding flecainide use  6. Alcohol use (up to 10 mini-bottles alcohol/day), and had not been drinking at his last appointment with me 3/12, but is subsequently started again.  Has not had any alcohol since 1 week before ablation 4/12/2019    Brayden underwent repeat PVI on 4/12/2019.  Dr. Day noted that he was in atrial fibrillation at baseline, and he underwent DC cardioversion.  He underwent reisolation of his for PVI's, which also had some element of all re- grown potentials.  He also underwent cavotricuspid isthmus ablation.    Dr. Day had indicated he should discontinue metoprolol tartrate 50 mg twice daily, but upon discharge this was not done.  He has remained on metoprolol tartrate 50 mg twice daily and Xarelto 20 mg (which he tells me he  actually takes in the morning).    Since then, he has done well.  He does not think he said any recurrent atrial fibrillation.  He denies any problems with groin site trouble.  He has no bleeding issues on the Xarelto.  He remains on Lasix 40 mg daily (2 of the 20 mg tablets).  With this, he does think that fluid is continuing to come off.  In the past, his symptom in atrial fibrillation was fluid retention despite a preserved ejection fraction.  He is hopeful to be able to back off on the Lasix as his heart continues to heal.    He has not had dizziness, lightheadedness or palpitations.  He denies any problems with current chest discomfort, but states for the first 48 to 72 hours after the procedure he did have some pleuritic chest discomfort.  He has had no trouble urinating post Palmer catheter removal.  He denies any fever/chills or trouble swallowing.    EKG today, which I overread, showed SB at 52 bpm with occasional PAC.  Nonspecific STTW changes noted.    Echocardiogram 3/2019 showed EF of 55-60%.  No regional wall motion abnormalities were noted.  He had 1+ mitral regurgitation and 1+ tricuspid regurgitation.  RV was normal in structure, function and size.  Aortic valve showed just trace to mild aortic regurgitation with aortic sclerosis.  Mean gradient was 4.6 mmHg (improved from previous) volume index is 46.7 mL/m  with LA dimension of 4.9 cm.     Cardiac MRI 10/2017 showed an EF of 51% with mild hypokinesis involving the basal inferolateral segment.  Evidence of previous myocardial infarction in the circumflex territory was noted.    Assessment & Plan:    1.  Persistent atrial fibrillation    As above, was noted to have regrowth of all 4 pulmonary veins, and underwent reisolation of this 4/12/2019.  He also had cavotricuspid isthmus ablation    His heart rate is low, and Dr. Day indicated that he should discontinue metoprolol upon discharge.  This was not done.  He continues to feel well.    He remains  on Xarelto, but is not taking it at the right time    He remains abstaining from alcohol    PLAN:    Continue alcohol cessation he understands that he has trouble even having just 1 drink without having that turned into more.  He has spoken with Dr. Kay about this in the past and knows how important it is to continue abstinence    Continue metoprolol, but decrease to 25 mg twice daily.  I hesitate to discontinue it entirely given his hypertension    Move Xarelto 20 mg to evening, explained that this should be taken with the largest meal of the day (supper)    See Dr. Day 6/2018    Contact us if he has arrhythmias lasting longer than 2 hours.      2.  Fluid overload    This is typically how he knows he is out of rhythm.  He has been diuresing well with Lasix.  Certainly abstaining from alcohol improves this    He still has quite a bit of fluid in the legs, but this is improving.  At home today, he was 290 pounds.    EF 51% on cardiac MRI 10/2017    PLAN:    Continue furosemide 40 mg daily and potassium chloride 30 mEq daily    Keep track of weights, and when he drops to 275#, contact me and we will likely decrease his furosemide.    He is to contact me earlier should he get any signs or concerns of dehydration    3.  Benign essential hypertension    Currently, he is on amlodipine 2.5 mg daily, Lasix 40 mg daily, losartan 100 mg daily and metoprolol tartrate 50 mg twice daily    Blood pressure came down nicely when he stopped drinking    He remains a bit fluid overloaded    PLAN:    As above, decrease metoprolol tartrate to 25 mg twice daily    Continue current medications, but could increase amlodipine to 5 mg daily if blood pressure remains elevated      Crystal Campos PA-C, MSPAS      Orders Placed This Encounter   Procedures     Follow-Up with Electrophysiologist     EKG 12-lead complete w/read - Clinics (performed today)     EKG 12-lead complete w/read - Clinics     Orders Placed This Encounter   Medications      metoprolol tartrate (LOPRESSOR) 50 MG tablet     Sig: Take 0.5 tablets (25 mg) by mouth 2 times daily     Medications Discontinued During This Encounter   Medication Reason     metoprolol tartrate (LOPRESSOR) 50 MG tablet Reorder         Encounter Diagnoses   Name Primary?     Persistent atrial fibrillation (H) Yes     Aortic valve stenosis      Ascending aorta dilatation (H)        CURRENT MEDICATIONS:  Current Outpatient Medications   Medication Sig Dispense Refill     allopurinol (ZYLOPRIM) 300 MG tablet Take 1 tablet (300 mg) by mouth daily 90 tablet 3     amLODIPine (NORVASC) 2.5 MG tablet Take 1 tablet (2.5 mg) by mouth daily 90 tablet 3     finasteride (PROPECIA) 1 MG tablet Take 1 tablet (1 mg) by mouth daily 90 tablet 3     furosemide (LASIX) 20 MG tablet Take 2 tablets (40 mg) by mouth daily       LORazepam (ATIVAN) 0.5 MG tablet Take 1-2 tablets (0.5-1 mg) by mouth every 6 hours as needed for anxiety (alcohol withdrawal) 30 tablet 0     losartan (COZAAR) 100 MG tablet Take 1 tablet (100 mg) by mouth daily 90 tablet 3     metoprolol tartrate (LOPRESSOR) 50 MG tablet Take 0.5 tablets (25 mg) by mouth 2 times daily       pantoprazole (PROTONIX) 40 MG EC tablet Take 1 tablet (40 mg) by mouth daily 30 tablet 0     potassium chloride ER (KLOR-CON) 10 MEQ CR tablet Take 3 tablets (30 mEq) by mouth daily       XARELTO 20 MG TABS tablet take 1 tablet (20mg) by mouth daily with dinner) avoid use of alcohol while using medication. 30 tablet 11     order for DME Equipment ordered: RESMED CPAP Mask type: Full face  Settings: 15         ALLERGIES     Allergies   Allergen Reactions     No Known Drug Allergies        PAST MEDICAL HISTORY:  Past Medical History:   Diagnosis Date     Essential hypertension, benign      H/O ETOH abuse      Herpes simplex without mention of complication      Hyperlipidemia      Obesity      Persistent atrial fibrillation (H)     persistent, DCCV 12/2017, ablation 11/6/17     Tobacco use  disorder     dced 2004       PAST SURGICAL HISTORY:  Past Surgical History:   Procedure Laterality Date     ANESTHESIA CARDIOVERSION N/A 2019    Procedure: ANESTHESIA CARDIOVERSION (PRIETO);  Surgeon: GENERIC ANESTHESIA PROVIDER;  Location:  OR     C NONSPECIFIC PROCEDURE      achilles tendon     C NONSPECIFIC PROCEDURE      knees, arthroscopy     C NONSPECIFIC PROCEDURE      basal cell skin ca, nose     C NONSPECIFIC PROCEDURE      flex sig; colonoscopy due 3/2008     C NONSPECIFIC PROCEDURE      cardioversion for atr fib     C TOTAL KNEE ARTHROPLASTY Left 2018    Dr. Malick Suarez     EP ABLATION FOCAL AFIB N/A 2019    Procedure: EP Ablation Focal AFIB;  Surgeon: Fady Prieto MD;  Location:  HEART CARDIAC CATH LAB     HERNIORRHAPHY UMBILICAL  2012    Procedure: HERNIORRHAPHY UMBILICAL;  UMBILICAL HERNIA REPAIR;  Surgeon: Ra Crocker MD;  Location: [facility]     ORTHOPEDIC SURGERY         FAMILY HISTORY:  Family History   Problem Relation Age of Onset     Family History Negative Mother      Heart Disease Father         decesased at 42 - MI     Heart Disease Sister          MI     Heart Disease Brother          MI     Heart Disease Brother         CABG AT 49     Lipids Sister      Lipids Sister      Lipids Sister      Family History Negative Brother        SOCIAL HISTORY:  Social History     Socioeconomic History     Marital status: Single     Spouse name: None     Number of children: None     Years of education: None     Highest education level: None   Occupational History     None   Social Needs     Financial resource strain: None     Food insecurity:     Worry: None     Inability: None     Transportation needs:     Medical: None     Non-medical: None   Tobacco Use     Smoking status: Former Smoker     Packs/day: 1.00     Years: 10.00     Pack years: 10.00     Types: Cigarettes     Start date:      Last attempt to quit: 2011     Years since quitting:  "7.6     Smokeless tobacco: Never Used   Substance and Sexual Activity     Alcohol use: No     Frequency: Never     Drug use: No     Sexual activity: Yes     Partners: Female   Lifestyle     Physical activity:     Days per week: None     Minutes per session: None     Stress: None   Relationships     Social connections:     Talks on phone: None     Gets together: None     Attends Shinto service: None     Active member of club or organization: None     Attends meetings of clubs or organizations: None     Relationship status: None     Intimate partner violence:     Fear of current or ex partner: None     Emotionally abused: None     Physically abused: None     Forced sexual activity: None   Other Topics Concern     Parent/sibling w/ CABG, MI or angioplasty before 65F 55M? Not Asked   Social History Narrative     None       Review of Systems:  Skin:  Negative     Eyes:  Positive for glasses(reading only)  ENT:  Negative    Respiratory:  Negative for sleep apnea;CPAP;dyspnea on exertion;shortness of breath  Cardiovascular:  palpitations;chest pain;syncope or near-syncope;cyanosis;lightheadedness;dizziness;Negative for Positive for;edema;exercise intolerance;fatigue  Gastroenterology: Negative for hematochezia;melena  Genitourinary:  Positive for urinary frequency  Musculoskeletal:  Negative    Neurologic:  Negative    Psychiatric:  Positive for excessive alcohol consumption  Heme/Lymph/Imm:  Negative    Endocrine:  Negative      Physical Exam:  Vitals: /90   Pulse 54   Ht 1.702 m (5' 7\")   Wt 133.4 kg (294 lb)   BMI 46.05 kg/m       Constitutional:  cooperative, alert and oriented, well developed, well nourished, in no acute distress obese      Skin:  warm and dry to the touch, no apparent skin lesions or masses noted        Head:  normocephalic        Eyes:  pupils equal and round;conjunctivae and lids unremarkable;sclera white        ENT:  no pallor or cyanosis, dentition good        Neck:  no carotid bruit " JVP 8-10;JVP elevated      Chest:  normal breath sounds, clear to auscultation, normal A-P diameter, normal symmetry, normal respiratory excursion, no use of accessory muscles        Cardiac: no murmurs, gallops or rubs detected;regular rhythm bradycardic                Abdomen:  non-tender;BS normoactive obese      Vascular: pulses full and equal                               right femoral bruit (-)      Extremities and Back:  no deformities, clubbing, cyanosis, erythema observed   improved    Neurological:  no gross motor deficits          Recent Lab Results:  LIPID RESULTS:  Lab Results   Component Value Date    CHOL 193 02/25/2019    HDL 45 02/25/2019     (H) 02/25/2019    TRIG 165 (H) 02/25/2019    CHOLHDLRATIO 3.8 03/16/2015       LIVER ENZYME RESULTS:  Lab Results   Component Value Date    AST 22 03/12/2019    ALT 40 03/12/2019       CBC RESULTS:  Lab Results   Component Value Date    WBC 7.1 04/12/2019    RBC 4.63 04/12/2019    HGB 16.4 04/12/2019    HCT 48.2 04/12/2019     (H) 04/12/2019    MCH 35.4 (H) 04/12/2019    MCHC 34.0 04/12/2019    RDW 13.2 04/12/2019     04/12/2019       BMP RESULTS:  Lab Results   Component Value Date     04/12/2019    POTASSIUM 3.7 04/12/2019    CHLORIDE 106 04/12/2019    CO2 28 04/12/2019    ANIONGAP 6 04/12/2019     (H) 04/12/2019    BUN 23 04/12/2019    CR 1.21 04/12/2019    GFRESTIMATED 61 04/12/2019    GFRESTBLACK 71 04/12/2019    ASHELY 8.3 (L) 04/12/2019        A1C RESULTS:  Lab Results   Component Value Date    A1C 5.7 (H) 02/25/2019       INR RESULTS:  Lab Results   Component Value Date    INR 2.04 (H) 02/26/2019    INR 1.66 (H) 11/06/2017           Thank you for allowing me to participate in the care of your patient.    Sincerely,     Natalie Campos PA-C     [facility]

## 2019-04-26 DIAGNOSIS — M10.9 GOUT OF FOOT, UNSPECIFIED CAUSE, UNSPECIFIED CHRONICITY, UNSPECIFIED LATERALITY: ICD-10-CM

## 2019-04-26 NOTE — TELEPHONE ENCOUNTER
"Requested Prescriptions   Pending Prescriptions Disp Refills     allopurinol (ZYLOPRIM) 300 MG tablet [Pharmacy Med Name: Allopurinol Oral Tablet 300 MG] 90 tablet 2     Sig: Take 1 tablet (300 mg) by mouth daily       Gout Agents Protocol Passed - 4/26/2019  7:00 AM        Passed - CBC on file in past 12 months     Recent Labs   Lab Test 04/12/19  0707   WBC 7.1   RBC 4.63   HGB 16.4   HCT 48.2                    Passed - ALT on file in past 12 months     Recent Labs   Lab Test 03/12/19  1337   ALT 40             Passed - Has Uric Acid on file in past 12 months and value is less than 6     Recent Labs   Lab Test 02/25/19  0756   URIC 5.1     If level is 6mg/dL or greater, ok to refill one time and refer to provider.           Passed - Recent (12 mo) or future (30 days) visit within the authorizing provider's specialty     Patient had office visit in the last 12 months or has a visit in the next 30 days with authorizing provider or within the authorizing provider's specialty.  See \"Patient Info\" tab in inbasket, or \"Choose Columns\" in Meds & Orders section of the refill encounter.              Passed - Medication is active on med list        Passed - Patient is age 18 or older        Passed - Normal serum creatinine on file in the past 12 months     Recent Labs   Lab Test 04/12/19  0707   CR 1.21             Last Written Prescription Date:  4/6/18  Last Fill Quantity: 90,  # refills: 3   Last office visit: 2/27/2019 with prescribing provider:  2/27/19   Future Office Visit:      "

## 2019-04-29 RX ORDER — ALLOPURINOL 300 MG/1
300 TABLET ORAL DAILY
Qty: 90 TABLET | Refills: 2 | Status: SHIPPED | OUTPATIENT
Start: 2019-04-29 | End: 2020-01-31

## 2019-05-02 DIAGNOSIS — I10 ESSENTIAL HYPERTENSION: ICD-10-CM

## 2019-05-02 NOTE — TELEPHONE ENCOUNTER
"Requested Prescriptions   Pending Prescriptions Disp Refills     losartan (COZAAR) 100 MG tablet [Pharmacy Med Name: Losartan Potassium Oral Tablet 100 MG]  Last Written Prescription Date:  04/06/2018  Last Fill Quantity: 90,  # refills: 03   Last Office Visit: 2/27/2019   Future Office Visit:      90 tablet 2     Sig: Take 1 tablet (100 mg) by mouth daily       Angiotensin-II Receptors Failed - 5/2/2019  6:50 AM        Failed - Blood pressure under 140/90 in past 12 months     BP Readings from Last 3 Encounters:   04/19/19 146/90   04/13/19 136/78   03/28/19 (!) 133/91                 Passed - Recent (12 mo) or future (30 days) visit within the authorizing provider's specialty     Patient had office visit in the last 12 months or has a visit in the next 30 days with authorizing provider or within the authorizing provider's specialty.  See \"Patient Info\" tab in inbasket, or \"Choose Columns\" in Meds & Orders section of the refill encounter.              Passed - Medication is active on med list        Passed - Patient is age 18 or older        Passed - Normal serum creatinine on file in past 12 months     Recent Labs   Lab Test 04/12/19  0707   CR 1.21             Passed - Normal serum potassium on file in past 12 months     Recent Labs   Lab Test 04/12/19  0707   POTASSIUM 3.7                      "

## 2019-05-05 RX ORDER — LOSARTAN POTASSIUM 100 MG/1
100 TABLET ORAL DAILY
Qty: 90 TABLET | Refills: 3 | Status: SHIPPED | OUTPATIENT
Start: 2019-05-05 | End: 2020-04-30

## 2019-05-07 DIAGNOSIS — R60.0 PERIPHERAL EDEMA: ICD-10-CM

## 2019-05-07 RX ORDER — FUROSEMIDE 20 MG
40 TABLET ORAL DAILY
Qty: 180 TABLET | Refills: 3 | Status: SHIPPED | OUTPATIENT
Start: 2019-05-07 | End: 2020-04-30

## 2019-05-07 NOTE — TELEPHONE ENCOUNTER
"Requested Prescriptions   Pending Prescriptions Disp Refills     furosemide (LASIX) 20 MG tablet [Pharmacy Med Name: Furosemide Oral Tablet 20 MG] 180 tablet 2     Sig: Take 2 tablets (40 mg) by mouth daily  Last Written Prescription Date:  03/12/19  Last Fill Quantity: ,  # refills:    Last office visit: 2/27/2019 with prescribing provider:  02/27/19   Future Office Visit:  0       Diuretics (Including Combos) Protocol Failed - 5/7/2019  7:19 AM        Failed - Blood pressure under 140/90 in past 12 months     BP Readings from Last 3 Encounters:   04/19/19 146/90   04/13/19 136/78   03/28/19 (!) 133/91                 Passed - Recent (12 mo) or future (30 days) visit within the authorizing provider's specialty     Patient had office visit in the last 12 months or has a visit in the next 30 days with authorizing provider or within the authorizing provider's specialty.  See \"Patient Info\" tab in inbasket, or \"Choose Columns\" in Meds & Orders section of the refill encounter.              Passed - Medication is active on med list        Passed - Patient is age 18 or older        Passed - Normal serum creatinine on file in past 12 months     Recent Labs   Lab Test 04/12/19  0707   CR 1.21              Passed - Normal serum potassium on file in past 12 months     Recent Labs   Lab Test 04/12/19  0707   POTASSIUM 3.7                    Passed - Normal serum sodium on file in past 12 months     Recent Labs   Lab Test 04/12/19  0707                 "

## 2019-06-03 DIAGNOSIS — I48.19 PERSISTENT ATRIAL FIBRILLATION (H): ICD-10-CM

## 2019-06-03 NOTE — TELEPHONE ENCOUNTER
Requested Prescriptions   Pending Prescriptions Disp Refills     XARELTO 20 MG TABS tablet [Pharmacy Med Name: Xarelto Oral Tablet 20 MG] 30 tablet 10     Sig: take 1 tablet (20mg) by mouth daily with dinner) avoid use of alcohol while using medication.       Direct Oral Anticoagulant Agents Failed - 6/3/2019  7:00 AM        Failed - Creatinine Clearance greater than 50 ml/min on file in past 3 mos     No lab results found.          Passed - Normal Platelets on file in past 12 months     Recent Labs   Lab Test 04/12/19  0707                  Passed - Medication is active on med list        Passed - Serum creatinine less than or equal to 1.4 on file in past 3 mos     Recent Labs   Lab Test 04/12/19  0707   CR 1.21             Passed - Patient is 18 years of age or older        Passed - Recent (6 mo) or future (30 days) visit within the authorizing provider's specialty        Last Written Prescription Date:  5/31/18  Last Fill Quantity: 30,  # refills: 11   Last office visit: 2/27/2019 with prescribing provider:  2/27/19   Future Office Visit:

## 2019-06-04 RX ORDER — RIVAROXABAN 20 MG/1
TABLET, FILM COATED ORAL
Qty: 30 TABLET | Refills: 2 | Status: SHIPPED | OUTPATIENT
Start: 2019-06-04 | End: 2019-09-06

## 2019-07-08 ENCOUNTER — OFFICE VISIT (OUTPATIENT)
Dept: CARDIOLOGY | Facility: CLINIC | Age: 69
End: 2019-07-08
Attending: PHYSICIAN ASSISTANT
Payer: COMMERCIAL

## 2019-07-08 VITALS
DIASTOLIC BLOOD PRESSURE: 74 MMHG | WEIGHT: 273 LBS | SYSTOLIC BLOOD PRESSURE: 139 MMHG | BODY MASS INDEX: 42.85 KG/M2 | HEART RATE: 51 BPM | HEIGHT: 67 IN

## 2019-07-08 DIAGNOSIS — I48.19 PERSISTENT ATRIAL FIBRILLATION (H): ICD-10-CM

## 2019-07-08 PROCEDURE — 99213 OFFICE O/P EST LOW 20 MIN: CPT | Mod: 25 | Performed by: INTERNAL MEDICINE

## 2019-07-08 PROCEDURE — 93000 ELECTROCARDIOGRAM COMPLETE: CPT | Performed by: INTERNAL MEDICINE

## 2019-07-08 ASSESSMENT — MIFFLIN-ST. JEOR: SCORE: 1971.95

## 2019-07-08 NOTE — PROGRESS NOTES
Service Date: 07/08/2019      PRIMARY CARE PHYSICIAN:  Johnny Kay MD      HISTORY OF PRESENT ILLNESS:  Thank you for allowing me to participate in the care of your delightful patient.  As you know, Brayden is a 68-year-old gentleman with history of symptomatic recurrent persistent atrial fibrillation for the past several years, refractory to flecainide, and, therefore, was put on rate control strategy for a while because of questionable symptoms.  Eventually, he had more symptoms with atrial fibrillation, prompting a catheter-based ablation, which was performed in 11/2017.  He had pulmonary veins and SVC isolation.      The patient was doing quite well until this past year when he had more episodes of atrial fibrillation required cardioversions.  In light of that, I recommended a repeat ablation.  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  Typical right sided trial flutter was induced and was ablated as well.  Since then, he has been doing quite well without any recurrence of his palpitation or shortness of breath. He checks his pulse on a regular basis and always notes it is quite regular in the mid 50s.  EKG today demonstrates sinus bradycardia with 1 PAC.  His metoprolol was recently reduced to half a tablet of 50 mg b.i.d.      Brayden mentioned that since the ablation, he lost about 30 pounds on purpose by eating healthier, more fruits and vegetables, and continuing to stay away from alcohol.  I told him that the triggers for potential recurrence of atrial fibrillation include alcohol/drinking and obesity and the lack of exercise.  As long as the patient can abstain from alcohol, exercise and lose weight, the success rate of the recent AFib ablation is even higher.  For now, will continue current medical therapy and have him come back to see Crystal in about 6 months' time for reassessment.       cc:   Johnny Kay MD   [facility]    [address] [place] [id number]         KIKE PRIETO MD             D: 2019   T: 2019   MT: al      Name:     TOO HOWARD   MRN:      [id number]        Account:      [id number]   :      1950           Service Date: 2019      Document: [id number]

## 2019-07-08 NOTE — LETTER
7/8/2019    Johnny Kay MD  [address] [facility] [address]    RE: Mike Schultz       Dear Colleague,    I had the pleasure of seeing Mike Schultz in the [facility].    HPI and Plan:   See dictation  [id number]  Orders Placed This Encounter   Procedures     Follow-Up with Cardiac Advanced Practice Provider     EKG 12-lead complete w/read - Clinics (performed today)       No orders of the defined types were placed in this encounter.      Medications Discontinued During This Encounter   Medication Reason     furosemide (LASIX) 20 MG tablet Medication Reconciliation Clean Up     LORazepam (ATIVAN) 0.5 MG tablet Medication Reconciliation Clean Up     pantoprazole (PROTONIX) 40 MG EC tablet Medication Reconciliation Clean Up         Encounter Diagnosis   Name Primary?     Persistent atrial fibrillation (H)        CURRENT MEDICATIONS:  Current Outpatient Medications   Medication Sig Dispense Refill     allopurinol (ZYLOPRIM) 300 MG tablet Take 1 tablet (300 mg) by mouth daily 90 tablet 2     amLODIPine (NORVASC) 2.5 MG tablet Take 1 tablet (2.5 mg) by mouth daily 90 tablet 3     finasteride (PROPECIA) 1 MG tablet Take 1 tablet (1 mg) by mouth daily 90 tablet 3     furosemide (LASIX) 20 MG tablet Take 2 tablets (40 mg) by mouth daily 180 tablet 3     losartan (COZAAR) 100 MG tablet Take 1 tablet (100 mg) by mouth daily 90 tablet 3     metoprolol tartrate (LOPRESSOR) 50 MG tablet Take 0.5 tablets (25 mg) by mouth 2 times daily       potassium chloride ER (KLOR-CON) 10 MEQ CR tablet Take 3 tablets (30 mEq) by mouth daily       XARELTO 20 MG TABS tablet take 1 tablet (20mg) by mouth daily with dinner) avoid use of alcohol while using medication. 30 tablet 2     order for DME Equipment ordered: RESMED CPAP Mask type: Full face  Settings: 15         ALLERGIES     Allergies   Allergen Reactions     No Known Drug Allergies        PAST MEDICAL HISTORY:  Past Medical History:   Diagnosis  Date     Essential hypertension, benign      H/O ETOH abuse      Herpes simplex without mention of complication      Hyperlipidemia      Obesity      Persistent atrial fibrillation (H)     persistent, DCCV 2017, ablation 17     Tobacco use disorder     dced 2004       PAST SURGICAL HISTORY:  Past Surgical History:   Procedure Laterality Date     ANESTHESIA CARDIOVERSION N/A 2019    Procedure: ANESTHESIA CARDIOVERSION (PRIETO);  Surgeon: GENERIC ANESTHESIA PROVIDER;  Location:  OR     C NONSPECIFIC PROCEDURE      achilles tendon     C NONSPECIFIC PROCEDURE      knees, arthroscopy     C NONSPECIFIC PROCEDURE      basal cell skin ca, nose     C NONSPECIFIC PROCEDURE      flex sig; colonoscopy due 3/2008     C NONSPECIFIC PROCEDURE      cardioversion for atr fib     C TOTAL KNEE ARTHROPLASTY Left 2018    Dr. Malick Suarez     EP ABLATION FOCAL AFIB N/A 2019    Procedure: EP Ablation Focal AFIB;  Surgeon: Fady Prieto MD;  Location:  HEART CARDIAC CATH LAB     HERNIORRHAPHY UMBILICAL  2012    Procedure: HERNIORRHAPHY UMBILICAL;  UMBILICAL HERNIA REPAIR;  Surgeon: Ra Crocker MD;  Location: [facility]     ORTHOPEDIC SURGERY         FAMILY HISTORY:  Family History   Problem Relation Age of Onset     Family History Negative Mother      Heart Disease Father         decesased at 42 - MI     Heart Disease Sister          MI     Heart Disease Brother          MI     Heart Disease Brother         CABG AT 49     Lipids Sister      Lipids Sister      Lipids Sister      Family History Negative Brother        SOCIAL HISTORY:  Social History     Socioeconomic History     Marital status: Single     Spouse name: None     Number of children: None     Years of education: None     Highest education level: None   Occupational History     None   Social Needs     Financial resource strain: None     Food insecurity:     Worry: None     Inability: None     Transportation needs:      "Medical: None     Non-medical: None   Tobacco Use     Smoking status: Former Smoker     Packs/day: 1.00     Years: 10.00     Pack years: 10.00     Types: Cigarettes     Start date:      Last attempt to quit: 2011     Years since quittin.8     Smokeless tobacco: Never Used   Substance and Sexual Activity     Alcohol use: No     Frequency: Never     Drug use: No     Sexual activity: Yes     Partners: Female   Lifestyle     Physical activity:     Days per week: None     Minutes per session: None     Stress: None   Relationships     Social connections:     Talks on phone: None     Gets together: None     Attends Faith service: None     Active member of club or organization: None     Attends meetings of clubs or organizations: None     Relationship status: None     Intimate partner violence:     Fear of current or ex partner: None     Emotionally abused: None     Physically abused: None     Forced sexual activity: None   Other Topics Concern     Parent/sibling w/ CABG, MI or angioplasty before 65F 55M? Not Asked   Social History Narrative     None       Review of Systems:  Skin:  Negative       Eyes:  Positive for glasses(reading only)    ENT:  Negative      Respiratory:  Positive for CPAP;sleep apnea     Cardiovascular:  Negative      Gastroenterology: Negative      Genitourinary:  Positive for urinary frequency    Musculoskeletal:  Negative      Neurologic:  Negative      Psychiatric:  Positive for excessive alcohol consumption currently off EtOH since 2019  Heme/Lymph/Imm:  Negative      Endocrine:  Negative        Physical Exam:  Vitals: /74   Pulse 51   Ht 1.702 m (5' 7\")   Wt 123.8 kg (273 lb)   BMI 42.76 kg/m       Constitutional:  cooperative, alert and oriented, well developed, well nourished, in no acute distress obese      Skin:  warm and dry to the touch, no apparent skin lesions or masses noted          Head:  normocephalic, no masses or lesions        Eyes:  pupils equal and " round, conjunctivae and lids unremarkable, sclera white, no xanthalasma, EOMS intact, no nystagmus        Lymph:No Cervical lymphadenopathy present     ENT:  no pallor or cyanosis, dentition good        Neck:  carotid pulses are full and equal bilaterally, JVP normal, no carotid bruit        Respiratory:  normal breath sounds, clear to auscultation, normal A-P diameter, normal symmetry, normal respiratory excursion, no use of accessory muscles         Cardiac: regular rhythm, normal S1/S2, no S3 or S4, apical impulse not displaced, no murmurs, gallops or rubs bradycardic              pulses full and equal, no bruits auscultated                                        GI:  abdomen soft, non-tender, BS normoactive, no mass, no HSM, no bruits obese      Extremities and Muscular Skeletal:                 Neurological:           Psych:  Alert and Oriented x 3        CC  Natalie Campos, HAROLDO  [address]  [place] [address]                Service Date: 07/08/2019      PRIMARY CARE PHYSICIAN:  Johnny Kay MD      HISTORY OF PRESENT ILLNESS:  Thank you for allowing me to participate in the care of your delightful patient.  As you know, Brayden is a 68-year-old gentleman with history of symptomatic recurrent persistent atrial fibrillation for the past several years, refractory to flecainide, and, therefore, was put on rate control strategy for a while because of questionable symptoms.  Eventually, he had more symptoms with atrial fibrillation, prompting a catheter-based ablation, which was performed in 11/2017.  He had pulmonary veins and SVC isolation.      The patient was doing quite well until this past year when he had more episodes of atrial fibrillation required cardioversions.  In light of that, I recommended a repeat ablation.  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  Typical right sided trial flutter was induced and was  ablated as well.  Since then, he has been doing quite well without any recurrence of his palpitation or shortness of breath. He checks his pulse on a regular basis and always notes it is quite regular in the mid 50s.  EKG today demonstrates sinus bradycardia with 1 PAC.  His metoprolol was recently reduced to half a tablet of 50 mg b.i.d.      Brayden mentioned that since the ablation, he lost about 30 pounds on purpose by eating healthier, more fruits and vegetables, and continuing to stay away from alcohol.  I told him that the triggers for potential recurrence of atrial fibrillation include alcohol/drinking and obesity and the lack of exercise.  As long as the patient can abstain from alcohol, exercise and lose weight, the success rate of the recent AFib ablation is even higher.  For now, will continue current medical therapy and have him come back to see Crystal in about 6 months' time for reassessment.       cc:   Johnny Kay MD   [address] [address]         KIKE PRIETO MD             D: 2019   T: 2019   MT: al      Name:     TOO HOWARD   MRN:      [id number]        Account:      [id number]   :      1950           Service Date: 2019      Document: [id number]        Thank you for allowing me to participate in the care of your patient.      Sincerely,     Kike Kent MD     [facility]    cc:   Natalie Campos PA-C  [address]  [place] [address]

## 2019-07-08 NOTE — PROGRESS NOTES
HPI and Plan:   See dictation  [id number]  Orders Placed This Encounter   Procedures     Follow-Up with Cardiac Advanced Practice Provider     EKG 12-lead complete w/read - Clinics (performed today)       No orders of the defined types were placed in this encounter.      Medications Discontinued During This Encounter   Medication Reason     furosemide (LASIX) 20 MG tablet Medication Reconciliation Clean Up     LORazepam (ATIVAN) 0.5 MG tablet Medication Reconciliation Clean Up     pantoprazole (PROTONIX) 40 MG EC tablet Medication Reconciliation Clean Up         Encounter Diagnosis   Name Primary?     Persistent atrial fibrillation (H)        CURRENT MEDICATIONS:  Current Outpatient Medications   Medication Sig Dispense Refill     allopurinol (ZYLOPRIM) 300 MG tablet Take 1 tablet (300 mg) by mouth daily 90 tablet 2     amLODIPine (NORVASC) 2.5 MG tablet Take 1 tablet (2.5 mg) by mouth daily 90 tablet 3     finasteride (PROPECIA) 1 MG tablet Take 1 tablet (1 mg) by mouth daily 90 tablet 3     furosemide (LASIX) 20 MG tablet Take 2 tablets (40 mg) by mouth daily 180 tablet 3     losartan (COZAAR) 100 MG tablet Take 1 tablet (100 mg) by mouth daily 90 tablet 3     metoprolol tartrate (LOPRESSOR) 50 MG tablet Take 0.5 tablets (25 mg) by mouth 2 times daily       potassium chloride ER (KLOR-CON) 10 MEQ CR tablet Take 3 tablets (30 mEq) by mouth daily       XARELTO 20 MG TABS tablet take 1 tablet (20mg) by mouth daily with dinner) avoid use of alcohol while using medication. 30 tablet 2     order for DME Equipment ordered: RESMED CPAP Mask type: Full face  Settings: 15         ALLERGIES     Allergies   Allergen Reactions     No Known Drug Allergies        PAST MEDICAL HISTORY:  Past Medical History:   Diagnosis Date     Essential hypertension, benign      H/O ETOH abuse      Herpes simplex without mention of complication      Hyperlipidemia      Obesity      Persistent atrial fibrillation (H)     persistent, [facility] 12/2017,  ablation 17     Tobacco use disorder     dced 2004       PAST SURGICAL HISTORY:  Past Surgical History:   Procedure Laterality Date     ANESTHESIA CARDIOVERSION N/A 2019    Procedure: ANESTHESIA CARDIOVERSION (CONNER);  Surgeon: GENERIC ANESTHESIA PROVIDER;  Location:  OR     C NONSPECIFIC PROCEDURE      achilles tendon     C NONSPECIFIC PROCEDURE      knees, arthroscopy     C NONSPECIFIC PROCEDURE      basal cell skin ca, nose     C NONSPECIFIC PROCEDURE      flex sig; colonoscopy due 3/2008     C NONSPECIFIC PROCEDURE      cardioversion for atr fib     C TOTAL KNEE ARTHROPLASTY Left 2018    Dr. Malick Suarez     EP ABLATION FOCAL AFIB N/A 2019    Procedure: EP Ablation Focal AFIB;  Surgeon: Fady Day MD;  Location:  HEART CARDIAC CATH LAB     HERNIORRHAPHY UMBILICAL  2012    Procedure: HERNIORRHAPHY UMBILICAL;  UMBILICAL HERNIA REPAIR;  Surgeon: Ra Crocker MD;  Location: [facility]     ORTHOPEDIC SURGERY         FAMILY HISTORY:  Family History   Problem Relation Age of Onset     Family History Negative Mother      Heart Disease Father         decesased at 42 - MI     Heart Disease Sister          MI     Heart Disease Brother          MI     Heart Disease Brother         CABG AT 49     Lipids Sister      Lipids Sister      Lipids Sister      Family History Negative Brother        SOCIAL HISTORY:  Social History     Socioeconomic History     Marital status: Single     Spouse name: None     Number of children: None     Years of education: None     Highest education level: None   Occupational History     None   Social Needs     Financial resource strain: None     Food insecurity:     Worry: None     Inability: None     Transportation needs:     Medical: None     Non-medical: None   Tobacco Use     Smoking status: Former Smoker     Packs/day: 1.00     Years: 10.00     Pack years: 10.00     Types: Cigarettes     Start date:      Last attempt to quit:  "2011     Years since quittin.8     Smokeless tobacco: Never Used   Substance and Sexual Activity     Alcohol use: No     Frequency: Never     Drug use: No     Sexual activity: Yes     Partners: Female   Lifestyle     Physical activity:     Days per week: None     Minutes per session: None     Stress: None   Relationships     Social connections:     Talks on phone: None     Gets together: None     Attends Voodoo service: None     Active member of club or organization: None     Attends meetings of clubs or organizations: None     Relationship status: None     Intimate partner violence:     Fear of current or ex partner: None     Emotionally abused: None     Physically abused: None     Forced sexual activity: None   Other Topics Concern     Parent/sibling w/ CABG, MI or angioplasty before 65F 55M? Not Asked   Social History Narrative     None       Review of Systems:  Skin:  Negative       Eyes:  Positive for glasses(reading only)    ENT:  Negative      Respiratory:  Positive for CPAP;sleep apnea     Cardiovascular:  Negative      Gastroenterology: Negative      Genitourinary:  Positive for urinary frequency    Musculoskeletal:  Negative      Neurologic:  Negative      Psychiatric:  Positive for excessive alcohol consumption currently off EtOH since 2019  Heme/Lymph/Imm:  Negative      Endocrine:  Negative        Physical Exam:  Vitals: /74   Pulse 51   Ht 1.702 m (5' 7\")   Wt 123.8 kg (273 lb)   BMI 42.76 kg/m      Constitutional:  cooperative, alert and oriented, well developed, well nourished, in no acute distress obese      Skin:  warm and dry to the touch, no apparent skin lesions or masses noted          Head:  normocephalic, no masses or lesions        Eyes:  pupils equal and round, conjunctivae and lids unremarkable, sclera white, no xanthalasma, EOMS intact, no nystagmus        Lymph:No Cervical lymphadenopathy present     ENT:  no pallor or cyanosis, dentition good        Neck:  " carotid pulses are full and equal bilaterally, JVP normal, no carotid bruit        Respiratory:  normal breath sounds, clear to auscultation, normal A-P diameter, normal symmetry, normal respiratory excursion, no use of accessory muscles         Cardiac: regular rhythm, normal S1/S2, no S3 or S4, apical impulse not displaced, no murmurs, gallops or rubs bradycardic              pulses full and equal, no bruits auscultated                                        GI:  abdomen soft, non-tender, BS normoactive, no mass, no HSM, no bruits obese      Extremities and Muscular Skeletal:                 Neurological:           Psych:  Alert and Oriented x 3        CC  Natalie Campos, PA-C  [address]  [place] [address]

## 2019-07-08 NOTE — LETTER
7/8/2019      Johnny Kay MD  [address] [facility][1] [id number]      RE: Mike Schultz       Dear Colleague,    I had the pleasure of seeing Mike Schultz in the [facility].    Service Date: 07/08/2019      PRIMARY CARE PHYSICIAN:  Johnny Kay MD      HISTORY OF PRESENT ILLNESS:  Thank you for allowing me to participate in the care of your delightful patient.  As you know, Brayden is a 68-year-old gentleman with history of symptomatic recurrent persistent atrial fibrillation for the past several years, refractory to flecainide, and, therefore, was put on rate control strategy for a while because of questionable symptoms.  Eventually, he had more symptoms with atrial fibrillation, prompting a catheter-based ablation, which was performed in 11/2017.  He had pulmonary veins and SVC isolation.      The patient was doing quite well until this past year when he had more episodes of atrial fibrillation required cardioversions.  In light of that, I recommended a repeat ablation.  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  Typical right sided trial flutter was induced and was ablated as well.  Since then, he has been doing quite well without any recurrence of his palpitation or shortness of breath. He checks his pulse on a regular basis and always notes it is quite regular in the mid 50s.  EKG today demonstrates sinus bradycardia with 1 PAC.  His metoprolol was recently reduced to half a tablet of 50 mg b.i.d.      Brayden mentioned that since the ablation, he lost about 30 pounds on purpose by eating healthier, more fruits and vegetables, and continuing to stay away from alcohol.  I told him that the triggers for potential recurrence of atrial fibrillation include alcohol/drinking and obesity and the lack of exercise.  As long as the patient can abstain from alcohol, exercise and lose weight, the success rate  of the recent AFib ablation is even higher.  For now, will continue current medical therapy and have him come back to see Crystal in about 6 months' time for reassessment.       cc:   Johnny Kay MD   [address] [address]         KIKE PRIETO MD             D: 2019   T: 2019   MT: al      Name:     TOO HOWARD   MRN:      [id number]        Account:      [id number]   :      1950           Service Date: 2019      Document: [id number]           Outpatient Encounter Medications as of 2019   Medication Sig Dispense Refill     allopurinol (ZYLOPRIM) 300 MG tablet Take 1 tablet (300 mg) by mouth daily 90 tablet 2     amLODIPine (NORVASC) 2.5 MG tablet Take 1 tablet (2.5 mg) by mouth daily 90 tablet 3     finasteride (PROPECIA) 1 MG tablet Take 1 tablet (1 mg) by mouth daily 90 tablet 3     furosemide (LASIX) 20 MG tablet Take 2 tablets (40 mg) by mouth daily 180 tablet 3     losartan (COZAAR) 100 MG tablet Take 1 tablet (100 mg) by mouth daily 90 tablet 3     metoprolol tartrate (LOPRESSOR) 50 MG tablet Take 0.5 tablets (25 mg) by mouth 2 times daily       potassium chloride ER (KLOR-CON) 10 MEQ CR tablet Take 3 tablets (30 mEq) by mouth daily       XARELTO 20 MG TABS tablet take 1 tablet (20mg) by mouth daily with dinner) avoid use of alcohol while using medication. 30 tablet 2     order for DME Equipment ordered: RESMED CPAP Mask type: Full face  Settings: 15       [DISCONTINUED] furosemide (LASIX) 20 MG tablet Take 2 tablets (40 mg) by mouth daily       [DISCONTINUED] LORazepam (ATIVAN) 0.5 MG tablet Take 1-2 tablets (0.5-1 mg) by mouth every 6 hours as needed for anxiety (alcohol withdrawal) (Patient not taking: Reported on 2019) 30 tablet 0     [DISCONTINUED] pantoprazole (PROTONIX) 40 MG EC tablet Take 1 tablet (40 mg) by mouth daily (Patient not taking: Reported on 2019) 30 tablet 0     No facility-administered encounter  medications on file as of 7/8/2019.        Again, thank you for allowing me to participate in the care of your patient.      Sincerely,    Fady Kent MD     [facility]

## 2019-09-06 DIAGNOSIS — I48.19 PERSISTENT ATRIAL FIBRILLATION (H): ICD-10-CM

## 2019-09-06 NOTE — TELEPHONE ENCOUNTER
Requested Prescriptions   Pending Prescriptions Disp Refills     XARELTO 20 MG TABS tablet [Pharmacy Med Name: Xarelto Oral Tablet 20 MG] 30 tablet 1     Sig: Take 1 tablet by mouth daily with dinner. Avoid use of alcohol while using medication.   Last Written Prescription Date:  6/4/2019  Last Fill Quantity: 30,  # refills: 2   Last Office Visit: 2/27/2019   Future Office Visit:         Direct Oral Anticoagulant Agents Failed - 9/6/2019  7:00 AM        Failed - Creatinine Clearance greater than 50 ml/min on file in past 3 mos     No lab results found.          Failed - Serum creatinine less than or equal to 1.4 on file in past 3 mos     Recent Labs   Lab Test 04/12/19  0707   CR 1.21             Failed - Recent (6 mo) or future (30 days) visit within the authorizing provider's specialty        Passed - Normal Platelets on file in past 12 months     Recent Labs   Lab Test 04/12/19  0707                  Passed - Medication is active on med list        Passed - Patient is 18 years of age or older

## 2019-09-09 RX ORDER — RIVAROXABAN 20 MG/1
TABLET, FILM COATED ORAL
Qty: 90 TABLET | Refills: 3 | Status: SHIPPED | OUTPATIENT
Start: 2019-09-09 | End: 2020-09-04

## 2019-09-09 NOTE — TELEPHONE ENCOUNTER
Routing refill request to provider for review/approval because:  Labs out of range:  CR clearance    Asia ANDERSON RN, BSN, PHN

## 2019-10-09 DIAGNOSIS — R60.0 PERIPHERAL EDEMA: ICD-10-CM

## 2019-10-09 RX ORDER — POTASSIUM CHLORIDE 750 MG/1
30 TABLET, EXTENDED RELEASE ORAL DAILY
Qty: 270 TABLET | Refills: 1 | Status: SHIPPED | OUTPATIENT
Start: 2019-10-09 | End: 2020-03-31

## 2019-10-09 NOTE — TELEPHONE ENCOUNTER
"Requested Prescriptions   Pending Prescriptions Disp Refills     potassium chloride ER (K-TAB/KLOR-CON) 10 MEQ CR tablet [Pharmacy Med Name: Potassium Chloride ER Oral Tablet Extended Release 10 MEQ] 270 tablet 0     Sig: Take 3 tablets (30 mEq) by mouth daily       Potassium Supplements Protocol Failed - 10/9/2019  7:00 AM        Failed - Medication is active on med list        Passed - Recent (12 mo) or future (30 days) visit within the authorizing provider's department     Patient has had an office visit with the authorizing provider or a provider within the authorizing providers department within the previous 12 mos or has a future within next 30 days. See \"Patient Info\" tab in inbasket, or \"Choose Columns\" in Meds & Orders section of the refill encounter.              Passed - Patient is age 18 or older        Passed - Normal serum potassium in past 12 months     Recent Labs   Lab Test 04/12/19  0707   POTASSIUM 3.7                    potassium chloride ER (KLOR-CON) 10 MEQ CR tablet      Last Written Prescription Date:  3/28/19  Last Fill Quantity: ,   # refills:   Last Office Visit: 2/27/19  Future Office visit:       Routing refill request to provider for review/approval because:  Medication is reported/historical    "

## 2019-10-10 ENCOUNTER — COMMUNICATION - HEALTHEAST (OUTPATIENT)
Dept: CARDIOLOGY | Facility: CLINIC | Age: 69
End: 2019-10-10

## 2019-10-14 ENCOUNTER — AMBULATORY - HEALTHEAST (OUTPATIENT)
Dept: CARDIOLOGY | Facility: CLINIC | Age: 69
End: 2019-10-14

## 2019-10-14 DIAGNOSIS — Z00.6 CLINICAL TRIAL EXAM: ICD-10-CM

## 2019-10-14 DIAGNOSIS — I48.19 PERSISTENT ATRIAL FIBRILLATION (H): ICD-10-CM

## 2019-10-14 LAB
ATRIAL RATE - MUSE: 41 BPM
DIASTOLIC BLOOD PRESSURE - MUSE: NORMAL
INTERPRETATION ECG - MUSE: NORMAL
P AXIS - MUSE: 39 DEGREES
PR INTERVAL - MUSE: 226 MS
QRS DURATION - MUSE: 90 MS
QT - MUSE: 538 MS
QTC - MUSE: 443 MS
R AXIS - MUSE: 34 DEGREES
SYSTOLIC BLOOD PRESSURE - MUSE: NORMAL
T AXIS - MUSE: 73 DEGREES
VENTRICULAR RATE- MUSE: 41 BPM

## 2019-10-14 ASSESSMENT — MIFFLIN-ST. JEOR: SCORE: 1955.14

## 2019-10-17 ENCOUNTER — AMBULATORY - HEALTHEAST (OUTPATIENT)
Dept: CARDIOLOGY | Facility: CLINIC | Age: 69
End: 2019-10-17

## 2019-12-15 ENCOUNTER — HEALTH MAINTENANCE LETTER (OUTPATIENT)
Age: 69
End: 2019-12-15

## 2019-12-23 DIAGNOSIS — I48.19 PERSISTENT ATRIAL FIBRILLATION (H): ICD-10-CM

## 2019-12-23 RX ORDER — AMLODIPINE BESYLATE 2.5 MG/1
2.5 TABLET ORAL DAILY
Qty: 90 TABLET | Refills: 0 | Status: SHIPPED | OUTPATIENT
Start: 2019-12-23 | End: 2020-03-31

## 2019-12-26 DIAGNOSIS — I48.19 PERSISTENT ATRIAL FIBRILLATION (H): ICD-10-CM

## 2019-12-26 RX ORDER — METOPROLOL TARTRATE 50 MG
25 TABLET ORAL 2 TIMES DAILY
Qty: 90 TABLET | Refills: 0 | Status: SHIPPED | OUTPATIENT
Start: 2019-12-26 | End: 2020-01-03

## 2019-12-30 ENCOUNTER — TELEPHONE (OUTPATIENT)
Dept: CARDIOLOGY | Facility: CLINIC | Age: 69
End: 2019-12-30

## 2019-12-30 DIAGNOSIS — I10 BENIGN ESSENTIAL HYPERTENSION: Primary | ICD-10-CM

## 2019-12-30 NOTE — TELEPHONE ENCOUNTER
Pt will come in at 0750 the morning of his OV with Crystal.  Pt will have CMP, TSH wT4 and fasting lipids.  Miguel Ángel

## 2019-12-30 NOTE — TELEPHONE ENCOUNTER
Pt to see Crystal Campos on 1/3 and wondering if he needed to have lab work completed prior.  Will discuss with Crystal if BMP is wanted. Miguel Ángel

## 2019-12-30 NOTE — TELEPHONE ENCOUNTER
That would be good. Pls order CMP, TSH/reflex for AFib. If he'll be fasting, OK to add lipids as well    Last Hgb was 4/2019  Last lipids in our system 2/2019, so will almost be 1 year    Reza Rojas

## 2020-01-01 NOTE — PROGRESS NOTES
HPI:   I had the pleasure of seeing Brayden when he came for follow up of recent repeat PVI.  He is a 69 year old who sees Dr. Day for his history of:     1. AFib dx'd in 2012. Failed flecainide. Underwent PVI and SVC isolation 11/2017. Repeat PVI and CTI for inducible AFlutter 4/2019  2. Preserved EF based on echo 10/2017 and EF 51% on cMRI 10/2017  3. Benign Essential HTN   4. BOWEN  5. Myocardiac Scar noted on cardiac MRI 10/2017, consistent with prior MI in the left Cx territory. EF 51%.  Avoiding flecainide use  6. H/o Alcohol dependence, no EtOH use since 4/2020!!  5. Chronic AC  On Xarelto for CHADSVASc 2 (HTN, age)      Dr. Day saw Brayden 7/2019 at which time he was doing really well following repeat ablation.  EKG showed SB. He had lost 30 pounds with healthier eating and avoidance of EtOH.  6 m follow-up was recommended.      Interval History:  Brayden has continue losing weight.  Works out two times a day (~40-50 minutes total), using the elliptical and exercise bike.  He's lost ~30# and is feeling really good.  He's not had any alcohol since 4/2019 at the time of his ablation. Sometimes, he thinks this is easier than others, but he's proud of what he's done so far.    No CP, SOB, edema (which is his typical sx when in AFib), lightheadedness, dizziness or palpitations.      Recent Diagnostic Testing:  EKG today, which I overread, showed SB 54 bpm with PAC  Echocardiogram 3/2019 showed EF of 55-60%.  No regional wall motion abnormalities were noted.  He had 1+ mitral regurgitation and 1+ tricuspid regurgitation.  RV was normal in structure, function and size.  Aortic valve showed just trace to mild aortic regurgitation with aortic sclerosis.  Mean gradient was 4.6 mmHg (improved from previous) volume index is 46.7 mL/m  with LA dimension of 4.9 cm.     Cardiac MRI 10/2017 showed an EF of 51% with mild hypokinesis involving the basal inferolateral segment.  Evidence of previous myocardial infarction in the  circumflex territory was noted.  Component      Latest Ref Rng & Units 4/12/2019 1/3/2020   Sodium      133 - 144 mmol/L 140 139   Potassium      3.4 - 5.3 mmol/L 3.7 3.9   Chloride      94 - 109 mmol/L 106 105   Carbon Dioxide      20 - 32 mmol/L 28 28   Anion Gap      3 - 14 mmol/L 6 6   Glucose      70 - 99 mg/dL 111 (H) 100 (H)   Urea Nitrogen      7 - 30 mg/dL 23 27   Creatinine      0.66 - 1.25 mg/dL 1.21 1.02   GFR Estimate      >60 mL/min/1.73:m2 61 75   GFR Estimate If Black      >60 mL/min/1.73:m2 71 87   Calcium      8.5 - 10.1 mg/dL 8.3 (L) 8.7     Component      Latest Ref Rng & Units 3/12/2019 1/3/2020   Bilirubin Total      0.2 - 1.3 mg/dL 0.7 0.3   Albumin      3.4 - 5.0 g/dL 3.7 3.6   Protein Total      6.8 - 8.8 g/dL 7.4 7.5   Alkaline Phosphatase      40 - 150 U/L 82 101   ALT      0 - 70 U/L 40 35   AST      0 - 45 U/L 22 19   Bilirubin Direct      0.0 - 0.2 mg/dL 0.2      Component      Latest Ref Rng & Units 10/12/2017 1/3/2020   TSH      0.40 - 4.00 mU/L 3.80 2.16     Component      Latest Ref Rng & Units 10/12/2017 2/25/2019 1/3/2020   Cholesterol      <200 mg/dL 178 193 163   Triglycerides      <150 mg/dL 190 (H) 165 (H) 183 (H)   HDL Cholesterol      >39 mg/dL 50 45 42   LDL Cholesterol Calculated      <100 mg/dL 90 115 (H) 84   Non HDL Cholesterol      <130 mg/dL 128 148 (H) 121     Assessment & Plan:    1. Persistent AFib    Repeat ablation 4/2019 (PVI and CTI)    Remains on Xarelto 20 mg daily. CHADSVASc is 2 (HTN, age)    EtOH use has ceased. This is great news!    TSH wnl     PLAN:    Remains on Xarelto 20 mg daily - he thinks Dr. Day told him he might be able to stop this and requests I check with Dr. Day    Will try HOLDING metoprolol tartrate x 3 weeks given concerns for weight loss plateau and bradycardia. We'll call him in 3 weeks with an update to see if this made him feel any different. If so, will keep him off of metoprolol. If not, will restart 25 mg BID (1/2 of a 50 mg  tab)      2. Benign Essential HTN    Currently, on amlodipine 2.5, Lasix 40, losartan 100 and metoprolol tartate 25 mg BID    Blood pressure came down nicely when he quit drinking and remains under good control    PLAN:    Hold metoprolol as above    Keep track of BP    3. H/o Fluid Overload    On Lasix 40 mg and KCl 30 mEq daily. EF on echo 3/2019 was wnl. BMP looks good.    His sxs of AFib are typically fluid overload    PLAN:    Continue Lasix and KCl    4. Lipids    Reviewed lipids, and note total cholesterol and LDL have improved dramatically with weight loss.     Trigs haven't improved as I would have expected, but fasting glucose better.    PLAN:    Repeat lipids with my visit 6 m      Crystal Campos PA-C, MSPAS      Orders Placed This Encounter   Procedures     Lipid Profile     ALT     Basic metabolic panel     Follow-Up with Cardiac Advanced Practice Provider     EKG 12-lead complete w/read - Clinics (performed today)     EKG 12-lead complete w/read - Clinics     Orders Placed This Encounter   Medications     cetirizine (ZYRTEC) 10 MG tablet     Sig: Take 10 mg by mouth as needed for allergies     Medications Discontinued During This Encounter   Medication Reason     potassium chloride ER (KLOR-CON) 10 MEQ CR tablet Medication Reconciliation Clean Up     metoprolol tartrate (LOPRESSOR) 50 MG tablet          Encounter Diagnoses   Name Primary?     Persistent atrial fibrillation      Dyslipidemia Yes       CURRENT MEDICATIONS:  Current Outpatient Medications   Medication Sig Dispense Refill     allopurinol (ZYLOPRIM) 300 MG tablet Take 1 tablet (300 mg) by mouth daily 90 tablet 2     amLODIPine (NORVASC) 2.5 MG tablet Take 1 tablet (2.5 mg) by mouth daily 90 tablet 0     cetirizine (ZYRTEC) 10 MG tablet Take 10 mg by mouth as needed for allergies       finasteride (PROPECIA) 1 MG tablet Take 1 tablet (1 mg) by mouth daily 90 tablet 3     furosemide (LASIX) 20 MG tablet Take 2 tablets (40 mg) by mouth daily 180  tablet 3     losartan (COZAAR) 100 MG tablet Take 1 tablet (100 mg) by mouth daily 90 tablet 3     potassium chloride ER (K-TAB/KLOR-CON) 10 MEQ CR tablet Take 3 tablets (30 mEq) by mouth daily 270 tablet 1     XARELTO 20 MG TABS tablet Take 1 tablet by mouth daily with dinner. Avoid use of alcohol while using medication. 90 tablet 3     order for DME Equipment ordered: RESMED CPAP Mask type: Full face  Settings: 15         ALLERGIES     Allergies   Allergen Reactions     No Known Drug Allergies        PAST MEDICAL HISTORY:  Past Medical History:   Diagnosis Date     Essential hypertension, benign      H/O ETOH abuse      Herpes simplex without mention of complication      Hyperlipidemia      Obesity      Persistent atrial fibrillation     persistent, DCCV 2017, ablation 17     Tobacco use disorder     dced 2004       PAST SURGICAL HISTORY:  Past Surgical History:   Procedure Laterality Date     ANESTHESIA CARDIOVERSION N/A 2019    Procedure: ANESTHESIA CARDIOVERSION (CONNER);  Surgeon: GENERIC ANESTHESIA PROVIDER;  Location:  OR      NONSPECIFIC PROCEDURE      achilles tendon     C NONSPECIFIC PROCEDURE      knees, arthroscopy     C NONSPECIFIC PROCEDURE      basal cell skin ca, nose     C NONSPECIFIC PROCEDURE      flex sig; colonoscopy due 3/2008     C NONSPECIFIC PROCEDURE      cardioversion for atr fib     C TOTAL KNEE ARTHROPLASTY Left 2018    Dr. Malick Suarez     EP ABLATION FOCAL AFIB N/A 2019    Procedure: EP Ablation Focal AFIB;  Surgeon: Fady Day MD;  Location:  HEART CARDIAC CATH LAB     HERNIORRHAPHY UMBILICAL  2012    Procedure: HERNIORRHAPHY UMBILICAL;  UMBILICAL HERNIA REPAIR;  Surgeon: Ra Crocker MD;  Location: [facility]     ORTHOPEDIC SURGERY         FAMILY HISTORY:  Family History   Problem Relation Age of Onset     Family History Negative Mother      Heart Disease Father         decesased at 42 - MI     Heart Disease Sister          MI      Heart Disease Brother          MI     Heart Disease Brother         CABG AT 49     Lipids Sister      Lipids Sister      Lipids Sister      Family History Negative Brother        SOCIAL HISTORY:  Social History     Socioeconomic History     Marital status: Single     Spouse name: None     Number of children: None     Years of education: None     Highest education level: None   Occupational History     None   Social Needs     Financial resource strain: None     Food insecurity:     Worry: None     Inability: None     Transportation needs:     Medical: None     Non-medical: None   Tobacco Use     Smoking status: Former Smoker     Packs/day: 1.00     Years: 10.00     Pack years: 10.00     Types: Cigarettes     Start date:      Last attempt to quit: 2011     Years since quittin.3     Smokeless tobacco: Never Used   Substance and Sexual Activity     Alcohol use: No     Frequency: Never     Drug use: No     Sexual activity: Yes     Partners: Female   Lifestyle     Physical activity:     Days per week: None     Minutes per session: None     Stress: None   Relationships     Social connections:     Talks on phone: None     Gets together: None     Attends Mosque service: None     Active member of club or organization: None     Attends meetings of clubs or organizations: None     Relationship status: None     Intimate partner violence:     Fear of current or ex partner: None     Emotionally abused: None     Physically abused: None     Forced sexual activity: None   Other Topics Concern     Parent/sibling w/ CABG, MI or angioplasty before 65F 55M? Not Asked   Social History Narrative     None       Review of Systems:  Skin:  Negative     Eyes:  Positive for glasses(reading only)  ENT:  Negative    Respiratory:  Positive for CPAP;sleep apnea  Cardiovascular:  Negative for;palpitations;chest pain;dizziness;lightheadedness;fatigue;edema    Gastroenterology: Negative for  "melena;hematochezia  Genitourinary:  Positive for urinary frequency  Musculoskeletal:  Negative    Neurologic:  Negative    Psychiatric:  Positive for excessive alcohol consumption  Heme/Lymph/Imm:  Negative    Endocrine:  Negative      Physical Exam:  Vitals: /73   Pulse 62   Ht 1.702 m (5' 7\")   Wt 120.3 kg (265 lb 3.2 oz)   BMI 41.54 kg/m      Constitutional:  cooperative, alert and oriented, well developed, well nourished, in no acute distress obese      Skin:  warm and dry to the touch, no apparent skin lesions or masses noted        Head:  normocephalic, no masses or lesions        Eyes:  pupils equal and round;conjunctivae and lids unremarkable;sclera white        ENT:  no pallor or cyanosis, dentition good        Neck:  JVP normal;no carotid bruit        Chest:  normal breath sounds, clear to auscultation, normal A-P diameter, normal symmetry, normal respiratory excursion, no use of accessory muscles        Cardiac: regular rhythm bradycardic                Abdomen:  abdomen soft obese      Vascular: pulses full and equal                                      Extremities and Back:           Neurological:  no gross motor deficits        Recent Lab Results:  LIPID RESULTS:  Lab Results   Component Value Date    CHOL 163 01/03/2020    HDL 42 01/03/2020    LDL 84 01/03/2020    TRIG 183 (H) 01/03/2020    CHOLHDLRATIO 3.8 03/16/2015       LIVER ENZYME RESULTS:  Lab Results   Component Value Date    AST 19 01/03/2020    ALT 35 01/03/2020       CBC RESULTS:  Lab Results   Component Value Date    WBC 7.1 04/12/2019    RBC 4.63 04/12/2019    HGB 16.4 04/12/2019    HCT 48.2 04/12/2019     (H) 04/12/2019    MCH 35.4 (H) 04/12/2019    MCHC 34.0 04/12/2019    RDW 13.2 04/12/2019     04/12/2019       BMP RESULTS:  Lab Results   Component Value Date     01/03/2020    POTASSIUM 3.9 01/03/2020    CHLORIDE 105 01/03/2020    CO2 28 01/03/2020    ANIONGAP 6 01/03/2020     (H) 01/03/2020    BUN " 27 01/03/2020    CR 1.02 01/03/2020    GFRESTIMATED 75 01/03/2020    GFRESTBLACK 87 01/03/2020    ASHELY 8.7 01/03/2020

## 2020-01-03 ENCOUNTER — OFFICE VISIT (OUTPATIENT)
Dept: CARDIOLOGY | Facility: CLINIC | Age: 70
End: 2020-01-03
Attending: INTERNAL MEDICINE
Payer: COMMERCIAL

## 2020-01-03 VITALS
SYSTOLIC BLOOD PRESSURE: 136 MMHG | WEIGHT: 265.2 LBS | HEIGHT: 67 IN | BODY MASS INDEX: 41.62 KG/M2 | HEART RATE: 62 BPM | DIASTOLIC BLOOD PRESSURE: 73 MMHG

## 2020-01-03 DIAGNOSIS — I48.19 PERSISTENT ATRIAL FIBRILLATION (H): ICD-10-CM

## 2020-01-03 DIAGNOSIS — I10 BENIGN ESSENTIAL HYPERTENSION: ICD-10-CM

## 2020-01-03 DIAGNOSIS — E78.5 DYSLIPIDEMIA: Primary | ICD-10-CM

## 2020-01-03 LAB
ALBUMIN SERPL-MCNC: 3.6 G/DL (ref 3.4–5)
ALP SERPL-CCNC: 101 U/L (ref 40–150)
ALT SERPL W P-5'-P-CCNC: 35 U/L (ref 0–70)
ANION GAP SERPL CALCULATED.3IONS-SCNC: 6 MMOL/L (ref 3–14)
AST SERPL W P-5'-P-CCNC: 19 U/L (ref 0–45)
BILIRUB SERPL-MCNC: 0.3 MG/DL (ref 0.2–1.3)
BUN SERPL-MCNC: 27 MG/DL (ref 7–30)
CALCIUM SERPL-MCNC: 8.7 MG/DL (ref 8.5–10.1)
CHLORIDE SERPL-SCNC: 105 MMOL/L (ref 94–109)
CHOLEST SERPL-MCNC: 163 MG/DL
CO2 SERPL-SCNC: 28 MMOL/L (ref 20–32)
CREAT SERPL-MCNC: 1.02 MG/DL (ref 0.66–1.25)
GFR SERPL CREATININE-BSD FRML MDRD: 75 ML/MIN/{1.73_M2}
GLUCOSE SERPL-MCNC: 100 MG/DL (ref 70–99)
HDLC SERPL-MCNC: 42 MG/DL
LDLC SERPL CALC-MCNC: 84 MG/DL
NONHDLC SERPL-MCNC: 121 MG/DL
POTASSIUM SERPL-SCNC: 3.9 MMOL/L (ref 3.4–5.3)
PROT SERPL-MCNC: 7.5 G/DL (ref 6.8–8.8)
SODIUM SERPL-SCNC: 139 MMOL/L (ref 133–144)
TRIGL SERPL-MCNC: 183 MG/DL
TSH SERPL DL<=0.005 MIU/L-ACNC: 2.16 MU/L (ref 0.4–4)

## 2020-01-03 PROCEDURE — 99214 OFFICE O/P EST MOD 30 MIN: CPT | Performed by: PHYSICIAN ASSISTANT

## 2020-01-03 PROCEDURE — 36415 COLL VENOUS BLD VENIPUNCTURE: CPT | Performed by: PHYSICIAN ASSISTANT

## 2020-01-03 PROCEDURE — 80053 COMPREHEN METABOLIC PANEL: CPT | Performed by: PHYSICIAN ASSISTANT

## 2020-01-03 PROCEDURE — 84443 ASSAY THYROID STIM HORMONE: CPT | Performed by: PHYSICIAN ASSISTANT

## 2020-01-03 PROCEDURE — 93000 ELECTROCARDIOGRAM COMPLETE: CPT | Performed by: PHYSICIAN ASSISTANT

## 2020-01-03 PROCEDURE — 80061 LIPID PANEL: CPT | Performed by: PHYSICIAN ASSISTANT

## 2020-01-03 RX ORDER — CETIRIZINE HYDROCHLORIDE 10 MG/1
10 TABLET ORAL EVERY EVENING
COMMUNITY

## 2020-01-03 ASSESSMENT — MIFFLIN-ST. JEOR: SCORE: 1931.57

## 2020-01-03 NOTE — LETTER
1/3/2020    Johnny Kay MD  [address] [facility] [address]    RE: Mike Schultz       Dear Colleague,    I had the pleasure of seeing Mike BRAXTON Schultz in the [facility].    HPI:   I had the pleasure of seeing Brayden when he came for follow up of recent repeat PVI.  He is a 69 year old who sees Dr. Day for his history of:     1. AFib dx'd in 2012. Failed flecainide. Underwent PVI and SVC isolation 11/2017. Repeat PVI and CTI for inducible AFlutter 4/2019  2. Preserved EF based on echo 10/2017 and EF 51% on cMRI 10/2017  3. Benign Essential HTN   4. BOWEN  5. Myocardiac Scar noted on cardiac MRI 10/2017, consistent with prior MI in the left Cx territory. EF 51%.  Avoiding flecainide use  6. H/o Alcohol dependence, no EtOH use since 4/2020!!  5. Chronic AC  On Xarelto for CHADSVASc 2 (HTN, age)      Dr. Day saw Brayden 7/2019 at which time he was doing really well following repeat ablation.  EKG showed SB. He had lost 30 pounds with healthier eating and avoidance of EtOH.  6 m follow-up was recommended.      Interval History:  Brayden has continue losing weight.  Works out two times a day (~40-50 minutes total), using the elliptical and exercise bike.  He's lost ~30# and is feeling really good.  He's not had any alcohol since 4/2019 at the time of his ablation. Sometimes, he thinks this is easier than others, but he's proud of what he's done so far.    No CP, SOB, edema (which is his typical sx when in AFib), lightheadedness, dizziness or palpitations.      Recent Diagnostic Testing:  EKG today, which I overread, showed SB 54 bpm with PAC  Echocardiogram 3/2019 showed EF of 55-60%.  No regional wall motion abnormalities were noted.  He had 1+ mitral regurgitation and 1+ tricuspid regurgitation.  RV was normal in structure, function and size.  Aortic valve showed just trace to mild aortic regurgitation with aortic sclerosis.  Mean gradient was 4.6 mmHg (improved from previous)  volume index is 46.7 mL/m  with LA dimension of 4.9 cm.     Cardiac MRI 10/2017 showed an EF of 51% with mild hypokinesis involving the basal inferolateral segment.  Evidence of previous myocardial infarction in the circumflex territory was noted.  Component      Latest Ref Rng & Units 4/12/2019 1/3/2020   Sodium      133 - 144 mmol/L 140 139   Potassium      3.4 - 5.3 mmol/L 3.7 3.9   Chloride      94 - 109 mmol/L 106 105   Carbon Dioxide      20 - 32 mmol/L 28 28   Anion Gap      3 - 14 mmol/L 6 6   Glucose      70 - 99 mg/dL 111 (H) 100 (H)   Urea Nitrogen      7 - 30 mg/dL 23 27   Creatinine      0.66 - 1.25 mg/dL 1.21 1.02   GFR Estimate      >60 mL/min/1.73:m2 61 75   GFR Estimate If Black      >60 mL/min/1.73:m2 71 87   Calcium      8.5 - 10.1 mg/dL 8.3 (L) 8.7     Component      Latest Ref Rng & Units 3/12/2019 1/3/2020   Bilirubin Total      0.2 - 1.3 mg/dL 0.7 0.3   Albumin      3.4 - 5.0 g/dL 3.7 3.6   Protein Total      6.8 - 8.8 g/dL 7.4 7.5   Alkaline Phosphatase      40 - 150 U/L 82 101   ALT      0 - 70 U/L 40 35   AST      0 - 45 U/L 22 19   Bilirubin Direct      0.0 - 0.2 mg/dL 0.2      Component      Latest Ref Rng & Units 10/12/2017 1/3/2020   TSH      0.40 - 4.00 mU/L 3.80 2.16     Component      Latest Ref Rng & Units 10/12/2017 2/25/2019 1/3/2020   Cholesterol      <200 mg/dL 178 193 163   Triglycerides      <150 mg/dL 190 (H) 165 (H) 183 (H)   HDL Cholesterol      >39 mg/dL 50 45 42   LDL Cholesterol Calculated      <100 mg/dL 90 115 (H) 84   Non HDL Cholesterol      <130 mg/dL 128 148 (H) 121     Assessment & Plan:    1. Persistent AFib    Repeat ablation 4/2019 (PVI and CTI)    Remains on Xarelto 20 mg daily. CHADSVASc is 2 (HTN, age)    EtOH use has ceased. This is great news!    TSH wnl     PLAN:    Remains on Xarelto 20 mg daily - he thinks Dr. Day told him he might be able to stop this and requests I check with Dr. Dya    Will try HOLDING metoprolol tartrate x 3 weeks given concerns  for weight loss plateau and bradycardia. We'll call him in 3 weeks with an update to see if this made him feel any different. If so, will keep him off of metoprolol. If not, will restart 25 mg BID (1/2 of a 50 mg tab)      2. Benign Essential HTN    Currently, on amlodipine 2.5, Lasix 40, losartan 100 and metoprolol tartate 25 mg BID    Blood pressure came down nicely when he quit drinking and remains under good control    PLAN:    Hold metoprolol as above    Keep track of BP    3. H/o Fluid Overload    On Lasix 40 mg and KCl 30 mEq daily. EF on echo 3/2019 was wnl. BMP looks good.    His sxs of AFib are typically fluid overload    PLAN:    Continue Lasix and KCl    4. Lipids    Reviewed lipids, and note total cholesterol and LDL have improved dramatically with weight loss.     Trigs haven't improved as I would have expected, but fasting glucose better.    PLAN:    Repeat lipids with my visit 6 m      Crystal Campos PA-C, MSPAS      Orders Placed This Encounter   Procedures     Lipid Profile     ALT     Basic metabolic panel     Follow-Up with Cardiac Advanced Practice Provider     EKG 12-lead complete w/read - Clinics (performed today)     EKG 12-lead complete w/read - Clinics     Orders Placed This Encounter   Medications     cetirizine (ZYRTEC) 10 MG tablet     Sig: Take 10 mg by mouth as needed for allergies     Medications Discontinued During This Encounter   Medication Reason     potassium chloride ER (KLOR-CON) 10 MEQ CR tablet Medication Reconciliation Clean Up     metoprolol tartrate (LOPRESSOR) 50 MG tablet          Encounter Diagnoses   Name Primary?     Persistent atrial fibrillation      Dyslipidemia Yes       CURRENT MEDICATIONS:  Current Outpatient Medications   Medication Sig Dispense Refill     allopurinol (ZYLOPRIM) 300 MG tablet Take 1 tablet (300 mg) by mouth daily 90 tablet 2     amLODIPine (NORVASC) 2.5 MG tablet Take 1 tablet (2.5 mg) by mouth daily 90 tablet 0     cetirizine (ZYRTEC) 10 MG  tablet Take 10 mg by mouth as needed for allergies       finasteride (PROPECIA) 1 MG tablet Take 1 tablet (1 mg) by mouth daily 90 tablet 3     furosemide (LASIX) 20 MG tablet Take 2 tablets (40 mg) by mouth daily 180 tablet 3     losartan (COZAAR) 100 MG tablet Take 1 tablet (100 mg) by mouth daily 90 tablet 3     potassium chloride ER (K-TAB/KLOR-CON) 10 MEQ CR tablet Take 3 tablets (30 mEq) by mouth daily 270 tablet 1     XARELTO 20 MG TABS tablet Take 1 tablet by mouth daily with dinner. Avoid use of alcohol while using medication. 90 tablet 3     order for DME Equipment ordered: RESMED CPAP Mask type: Full face  Settings: 15         ALLERGIES     Allergies   Allergen Reactions     No Known Drug Allergies        PAST MEDICAL HISTORY:  Past Medical History:   Diagnosis Date     Essential hypertension, benign      H/O ETOH abuse      Herpes simplex without mention of complication      Hyperlipidemia      Obesity      Persistent atrial fibrillation     persistent, DCCV 12/2017, ablation 11/6/17     Tobacco use disorder     dced 6/2004       PAST SURGICAL HISTORY:  Past Surgical History:   Procedure Laterality Date     ANESTHESIA CARDIOVERSION N/A 2/26/2019    Procedure: ANESTHESIA CARDIOVERSION (PRIETO);  Surgeon: GENERIC ANESTHESIA PROVIDER;  Location:  OR     C NONSPECIFIC PROCEDURE  1997    achilles tendon     C NONSPECIFIC PROCEDURE      knees, arthroscopy     C NONSPECIFIC PROCEDURE      basal cell skin ca, nose     C NONSPECIFIC PROCEDURE      flex sig; colonoscopy due 3/2008     C NONSPECIFIC PROCEDURE  2012    cardioversion for atr fib     C TOTAL KNEE ARTHROPLASTY Left 03/16/2018    Dr. Malick Suarez     EP ABLATION FOCAL AFIB N/A 4/12/2019    Procedure: EP Ablation Focal AFIB;  Surgeon: Fady Prieto MD;  Location:  HEART CARDIAC CATH LAB     HERNIORRHAPHY UMBILICAL  8/20/2012    Procedure: HERNIORRHAPHY UMBILICAL;  UMBILICAL HERNIA REPAIR;  Surgeon: Ra Crocker MD;  Location: [facility]      ORTHOPEDIC SURGERY         FAMILY HISTORY:  Family History   Problem Relation Age of Onset     Family History Negative Mother      Heart Disease Father         decesased at 42 - MI     Heart Disease Sister          MI     Heart Disease Brother          MI     Heart Disease Brother         CABG AT 49     Lipids Sister      Lipids Sister      Lipids Sister      Family History Negative Brother        SOCIAL HISTORY:  Social History     Socioeconomic History     Marital status: Single     Spouse name: None     Number of children: None     Years of education: None     Highest education level: None   Occupational History     None   Social Needs     Financial resource strain: None     Food insecurity:     Worry: None     Inability: None     Transportation needs:     Medical: None     Non-medical: None   Tobacco Use     Smoking status: Former Smoker     Packs/day: 1.00     Years: 10.00     Pack years: 10.00     Types: Cigarettes     Start date:      Last attempt to quit: 2011     Years since quittin.3     Smokeless tobacco: Never Used   Substance and Sexual Activity     Alcohol use: No     Frequency: Never     Drug use: No     Sexual activity: Yes     Partners: Female   Lifestyle     Physical activity:     Days per week: None     Minutes per session: None     Stress: None   Relationships     Social connections:     Talks on phone: None     Gets together: None     Attends Zoroastrianism service: None     Active member of club or organization: None     Attends meetings of clubs or organizations: None     Relationship status: None     Intimate partner violence:     Fear of current or ex partner: None     Emotionally abused: None     Physically abused: None     Forced sexual activity: None   Other Topics Concern     Parent/sibling w/ CABG, MI or angioplasty before 65F 55M? Not Asked   Social History Narrative     None       Review of Systems:  Skin:  Negative     Eyes:  Positive for glasses(reading  "only)  ENT:  Negative    Respiratory:  Positive for CPAP;sleep apnea  Cardiovascular:  Negative for;palpitations;chest pain;dizziness;lightheadedness;fatigue;edema    Gastroenterology: Negative for melena;hematochezia  Genitourinary:  Positive for urinary frequency  Musculoskeletal:  Negative    Neurologic:  Negative    Psychiatric:  Positive for excessive alcohol consumption  Heme/Lymph/Imm:  Negative    Endocrine:  Negative      Physical Exam:  Vitals: /73   Pulse 62   Ht 1.702 m (5' 7\")   Wt 120.3 kg (265 lb 3.2 oz)   BMI 41.54 kg/m       Constitutional:  cooperative, alert and oriented, well developed, well nourished, in no acute distress obese      Skin:  warm and dry to the touch, no apparent skin lesions or masses noted        Head:  normocephalic, no masses or lesions        Eyes:  pupils equal and round;conjunctivae and lids unremarkable;sclera white        ENT:  no pallor or cyanosis, dentition good        Neck:  JVP normal;no carotid bruit        Chest:  normal breath sounds, clear to auscultation, normal A-P diameter, normal symmetry, normal respiratory excursion, no use of accessory muscles        Cardiac: regular rhythm bradycardic                Abdomen:  abdomen soft obese      Vascular: pulses full and equal                                      Extremities and Back:           Neurological:  no gross motor deficits        Recent Lab Results:  LIPID RESULTS:  Lab Results   Component Value Date    CHOL 163 01/03/2020    HDL 42 01/03/2020    LDL 84 01/03/2020    TRIG 183 (H) 01/03/2020    CHOLHDLRATIO 3.8 03/16/2015       LIVER ENZYME RESULTS:  Lab Results   Component Value Date    AST 19 01/03/2020    ALT 35 01/03/2020       CBC RESULTS:  Lab Results   Component Value Date    WBC 7.1 04/12/2019    RBC 4.63 04/12/2019    HGB 16.4 04/12/2019    HCT 48.2 04/12/2019     (H) 04/12/2019    MCH 35.4 (H) 04/12/2019    MCHC 34.0 04/12/2019    RDW 13.2 04/12/2019     04/12/2019       BMP " RESULTS:  Lab Results   Component Value Date     01/03/2020    POTASSIUM 3.9 01/03/2020    CHLORIDE 105 01/03/2020    CO2 28 01/03/2020    ANIONGAP 6 01/03/2020     (H) 01/03/2020    BUN 27 01/03/2020    CR 1.02 01/03/2020    GFRESTIMATED 75 01/03/2020    GFRESTBLACK 87 01/03/2020    ASHELY 8.7 01/03/2020                      Thank you for allowing me to participate in the care of your patient.    Sincerely,     Natalie Campos PA-C     [facility]

## 2020-01-03 NOTE — PATIENT INSTRUCTIONS
1. EKG today looks great. Normal rhythm!  This is good news!    2. GREAT work on the diet, exercise and alcohol    3. Switch Xarelto to nighttime (biggest meal) .. I'll talk to Barb about potentially stopping that per your request.    4. Try STOPPING the metoprolol to see if this improves weight loss.  I don't think it will make much difference given the low dose, but OK to come off of x 3 weeks and reassess.     5. See us back in 6 months with EKG with repeat cholesterol check    [phone]

## 2020-01-28 ENCOUNTER — TELEPHONE (OUTPATIENT)
Dept: CARDIOLOGY | Facility: CLINIC | Age: 70
End: 2020-01-28

## 2020-01-28 RX ORDER — METOPROLOL TARTRATE 50 MG
25 TABLET ORAL 2 TIMES DAILY
COMMUNITY
Start: 2020-01-28 | End: 2020-12-10

## 2020-01-28 NOTE — TELEPHONE ENCOUNTER
Pt called back and states that he has not had any change in his weight, thus pt will restart his Metoprolol 25 mg bid. Pt will need to split his 50 mg tab in 1/2.  Pt is asking how long he will need to be on the Metoprolol. Explained that he is taking for his A Fib and also his HTN. Pt states that he is normal now. Did explain that if he was to convert back that Metoprolol would help with rate. Pt then wondering if should be seen in April as that is one year post ablation. Pt saw pt in January and ordered to be seen in July. Will make Crystal aware of pt questions. Miguel Ángel

## 2020-01-28 NOTE — TELEPHONE ENCOUNTER
Natalie Campos PA-C [facility] Nurse             Pls call Brayden week of 1/27 ... I stopped his metoprolol tartrate 25 mg BID (he had been taking all 50 mg at once) due to bradycardia and inability to lose weight.     Pls ask him how his weight is and how he's feeling off of the metoprolol. If no change, pls have him restart metoprolol tartrate 25 mg BID.  If he's feeling better/weight coming off, pls have him stay off of metoprolol tartrate 25 mg BID (1/2 of the 50 mg tabs).     Pls update EPIC      LM for pt to call back to see how he is doing. Miguel Ángel

## 2020-01-31 DIAGNOSIS — M10.9 GOUT OF FOOT, UNSPECIFIED CAUSE, UNSPECIFIED CHRONICITY, UNSPECIFIED LATERALITY: ICD-10-CM

## 2020-01-31 RX ORDER — ALLOPURINOL 300 MG/1
300 TABLET ORAL DAILY
Qty: 90 TABLET | Refills: 0 | Status: SHIPPED | OUTPATIENT
Start: 2020-01-31 | End: 2020-02-17

## 2020-01-31 NOTE — LETTER
[facility]  [address] [id number] [phone]            Mike M Antoine  [address] [facility] [address]        January 31, 2020    Dear Brayden,    While refilling your prescription today, we noticed that you are due for an appointment with your provider.  We will refill your prescription for 90 days, but a follow-up appointment must be made before any additional refills can be approved.     Taking care of your health is important to us and we look forward to seeing you in the near future.  Please call us at [phone] or [phone] (or use [email or web address]) to schedule an appointment.     Please disregard this notice if you have already made an appointment.    Sincerely,        [facility]

## 2020-01-31 NOTE — TELEPHONE ENCOUNTER
"Requested Prescriptions   Pending Prescriptions Disp Refills     allopurinol (ZYLOPRIM) 300 MG tablet [Pharmacy Med Name: Allopurinol Oral Tablet 300 MG] 90 tablet 1     Sig: Take 1 tablet (300 mg) by mouth daily       Gout Agents Protocol Passed - 1/31/2020  9:34 AM        Passed - CBC on file in past 12 months     Recent Labs   Lab Test 04/12/19  0707   WBC 7.1   RBC 4.63   HGB 16.4   HCT 48.2                    Passed - ALT on file in past 12 months     Recent Labs   Lab Test 01/03/20  0749   ALT 35             Passed - Has Uric Acid on file in past 12 months and value is less than 6     Recent Labs   Lab Test 02/25/19  0756   URIC 5.1     If level is 6mg/dL or greater, ok to refill one time and refer to provider.           Passed - Recent (12 mo) or future (30 days) visit within the authorizing provider's specialty     Patient has had an office visit with the authorizing provider or a provider within the authorizing providers department within the previous 12 mos or has a future within next 30 days. See \"Patient Info\" tab in inbasket, or \"Choose Columns\" in Meds & Orders section of the refill encounter.              Passed - Medication is active on med list        Passed - Patient is age 18 or older        Passed - Normal serum creatinine on file in the past 12 months     Recent Labs   Lab Test 01/03/20  0749   CR 1.02             Last Written Prescription Date:  4/29/19  Last Fill Quantity: 90,  # refills: 2   Last office visit: 2/27/2019 with prescribing provider:  2/27/19   Future Office Visit:      "

## 2020-01-31 NOTE — TELEPHONE ENCOUNTER
Prescription approved per [facility] [place].    Asia ERAZON, RN, PHN

## 2020-02-02 NOTE — TELEPHONE ENCOUNTER
Thx for update.    Doesn't need to be seen 1 year after procedure b/c we've seen him since and he's doing well BUT happy to see him 4/2020 if he'd prefer.    We'll still keep 7/2020 appt with lipids.    Thx-  K

## 2020-02-03 NOTE — TELEPHONE ENCOUNTER
02/03/20 Left detailed msg explaining that he does not need to come in 1 year post ablation unless he prefers. Otherwise chart is flagged to call and set up July 2020 appt. Enc to call back prior if problems or if he has further questions. Byron 844 am

## 2020-02-15 DIAGNOSIS — M10.9 GOUT OF FOOT, UNSPECIFIED CAUSE, UNSPECIFIED CHRONICITY, UNSPECIFIED LATERALITY: ICD-10-CM

## 2020-02-15 NOTE — TELEPHONE ENCOUNTER
"Requested Prescriptions   Pending Prescriptions Disp Refills     allopurinol (ZYLOPRIM) 300 MG tablet [Pharmacy Med Name: Allopurinol Oral Tablet 300 MG] 90 tablet 1     Sig: Take 1 tablet (300 mg) by mouth daily   Last Written Prescription Date:  1/31/2020  Last Fill Quantity: 90,  # refills: 0   Last Office Visit: 2/27/2019   Future Office Visit:         Gout Agents Protocol Passed - 2/15/2020  5:21 AM        Passed - CBC on file in past 12 months     Recent Labs   Lab Test 04/12/19  0707   WBC 7.1   RBC 4.63   HGB 16.4   HCT 48.2                    Passed - ALT on file in past 12 months     Recent Labs   Lab Test 01/03/20  0749   ALT 35             Passed - Has Uric Acid on file in past 12 months and value is less than 6     Recent Labs   Lab Test 02/25/19  0756   URIC 5.1     If level is 6mg/dL or greater, ok to refill one time and refer to provider.           Passed - Recent (12 mo) or future (30 days) visit within the authorizing provider's specialty     Patient has had an office visit with the authorizing provider or a provider within the authorizing providers department within the previous 12 mos or has a future within next 30 days. See \"Patient Info\" tab in inbasket, or \"Choose Columns\" in Meds & Orders section of the refill encounter.              Passed - Medication is active on med list        Passed - Patient is age 18 or older        Passed - Normal serum creatinine on file in the past 12 months     Recent Labs   Lab Test 01/03/20  0749   CR 1.02               "

## 2020-02-17 RX ORDER — ALLOPURINOL 300 MG/1
300 TABLET ORAL DAILY
Qty: 90 TABLET | Refills: 0 | Status: SHIPPED | OUTPATIENT
Start: 2020-02-17 | End: 2020-08-19

## 2020-02-17 NOTE — TELEPHONE ENCOUNTER
Prescription approved per [facility] Refill Protocol for 12 months of refills since last appointment, which was 3/28/2019.

## 2020-03-06 DIAGNOSIS — L65.9 ALOPECIA: ICD-10-CM

## 2020-03-06 RX ORDER — FINASTERIDE 1 MG/1
1 TABLET, FILM COATED ORAL DAILY
Qty: 30 TABLET | Refills: 0 | Status: SHIPPED | OUTPATIENT
Start: 2020-03-06 | End: 2020-04-30

## 2020-03-06 NOTE — LETTER
[facility]  [address] [id number] [phone]            Mike LIMON Antoine  [address] [facility] [address]        March 6, 2020    Dear Brayden,    While refilling your prescription today, we noticed that you are due for an appointment with your provider.  We will refill your prescription for 30 days, but a follow-up appointment must be made before any additional refills can be approved.     Taking care of your health is important to us and we look forward to seeing you in the near future.  Please call us at [phone] or [phone] (or use [email or web address]) to schedule an appointment.     Please disregard this notice if you have already made an appointment.    Sincerely,        [facility]

## 2020-03-06 NOTE — TELEPHONE ENCOUNTER
"Requested Prescriptions   Pending Prescriptions Disp Refills     finasteride (PROPECIA) 1 MG tablet [Pharmacy Med Name: Finasteride Oral Tablet 1 MG] 90 tablet 2     Sig: Take 1 tablet (1 mg) by mouth daily       Miscellaneous Dermatologic Agents Failed - 3/6/2020  7:00 AM        Failed - Recent (12 mo) or future (30 days) visit within the authorizing provider's specialty     Patient has had an office visit with the authorizing provider or a provider within the authorizing providers department within the previous 12 mos or has a future within next 30 days. See \"Patient Info\" tab in inbasket, or \"Choose Columns\" in Meds & Orders section of the refill encounter.              Failed - Refill request is not for Imiquimod, 5-Fluorouracil, or Finasteride      If Imiquimod, 5-Fluorouracil, or Finasteride, may refill if indicated in progress notes.           Passed - Medication is active on med list        Passed - Patient is 24 mos old or older        Medication is being filled for 1 time refill only due to:  Patient needs to be seen because it has been more than one year since last visit.    "

## 2020-03-31 DIAGNOSIS — I48.19 PERSISTENT ATRIAL FIBRILLATION (H): ICD-10-CM

## 2020-03-31 RX ORDER — AMLODIPINE BESYLATE 2.5 MG/1
2.5 TABLET ORAL DAILY
Qty: 90 TABLET | Refills: 1 | Status: SHIPPED | OUTPATIENT
Start: 2020-03-31 | End: 2020-09-25

## 2020-04-23 DIAGNOSIS — R60.0 PERIPHERAL EDEMA: ICD-10-CM

## 2020-04-23 DIAGNOSIS — I10 ESSENTIAL HYPERTENSION: ICD-10-CM

## 2020-04-23 DIAGNOSIS — L65.9 ALOPECIA: ICD-10-CM

## 2020-04-30 RX ORDER — LOSARTAN POTASSIUM 100 MG/1
100 TABLET ORAL DAILY
Qty: 90 TABLET | Refills: 0 | Status: SHIPPED | OUTPATIENT
Start: 2020-04-30 | End: 2020-08-04

## 2020-04-30 RX ORDER — FINASTERIDE 1 MG/1
1 TABLET, FILM COATED ORAL DAILY
Qty: 30 TABLET | Refills: 0 | Status: SHIPPED | OUTPATIENT
Start: 2020-04-30 | End: 2020-06-04

## 2020-04-30 RX ORDER — FUROSEMIDE 20 MG
40 TABLET ORAL DAILY
Qty: 180 TABLET | Refills: 0 | Status: SHIPPED | OUTPATIENT
Start: 2020-04-30 | End: 2020-07-06

## 2020-05-21 DIAGNOSIS — G47.33 OSA (OBSTRUCTIVE SLEEP APNEA): ICD-10-CM

## 2020-06-03 DIAGNOSIS — L65.9 ALOPECIA: ICD-10-CM

## 2020-06-04 RX ORDER — FINASTERIDE 1 MG/1
1 TABLET, FILM COATED ORAL DAILY
Qty: 30 TABLET | Refills: 0 | Status: SHIPPED | OUTPATIENT
Start: 2020-06-04 | End: 2020-07-09

## 2020-06-04 NOTE — TELEPHONE ENCOUNTER
Routing refill request to provider for review/approval because:    Miscellaneous Dermatologic Agents [id number]/03/2020 11:01 PM   Refill request is not for Imiquimod, 5-Fluorouracil, or Finasteride

## 2020-06-22 DIAGNOSIS — I48.19 PERSISTENT ATRIAL FIBRILLATION (H): ICD-10-CM

## 2020-06-22 DIAGNOSIS — E78.5 DYSLIPIDEMIA: ICD-10-CM

## 2020-06-22 LAB
ALT SERPL W P-5'-P-CCNC: 31 U/L (ref 0–70)
ANION GAP SERPL CALCULATED.3IONS-SCNC: 5 MMOL/L (ref 3–14)
BUN SERPL-MCNC: 20 MG/DL (ref 7–30)
CALCIUM SERPL-MCNC: 8.6 MG/DL (ref 8.5–10.1)
CHLORIDE SERPL-SCNC: 109 MMOL/L (ref 94–109)
CHOLEST SERPL-MCNC: 164 MG/DL
CO2 SERPL-SCNC: 27 MMOL/L (ref 20–32)
CREAT SERPL-MCNC: 0.89 MG/DL (ref 0.66–1.25)
GFR SERPL CREATININE-BSD FRML MDRD: 87 ML/MIN/{1.73_M2}
GLUCOSE SERPL-MCNC: 126 MG/DL (ref 70–99)
HDLC SERPL-MCNC: 32 MG/DL
LDLC SERPL CALC-MCNC: 95 MG/DL
NONHDLC SERPL-MCNC: 132 MG/DL
POTASSIUM SERPL-SCNC: 3.6 MMOL/L (ref 3.4–5.3)
SODIUM SERPL-SCNC: 141 MMOL/L (ref 133–144)
TRIGL SERPL-MCNC: 184 MG/DL

## 2020-06-22 PROCEDURE — 80048 BASIC METABOLIC PNL TOTAL CA: CPT | Performed by: INTERNAL MEDICINE

## 2020-06-22 PROCEDURE — 80061 LIPID PANEL: CPT | Performed by: INTERNAL MEDICINE

## 2020-06-22 PROCEDURE — 84460 ALANINE AMINO (ALT) (SGPT): CPT | Performed by: INTERNAL MEDICINE

## 2020-06-22 PROCEDURE — 36415 COLL VENOUS BLD VENIPUNCTURE: CPT | Performed by: INTERNAL MEDICINE

## 2020-06-28 DIAGNOSIS — L65.9 ALOPECIA: ICD-10-CM

## 2020-06-30 ENCOUNTER — TELEPHONE (OUTPATIENT)
Dept: CARDIOLOGY | Facility: CLINIC | Age: 70
End: 2020-06-30

## 2020-06-30 NOTE — TELEPHONE ENCOUNTER

## 2020-07-01 DIAGNOSIS — I48.19 PERSISTENT ATRIAL FIBRILLATION (H): ICD-10-CM

## 2020-07-01 DIAGNOSIS — E78.5 DYSLIPIDEMIA: ICD-10-CM

## 2020-07-01 PROCEDURE — 93000 ELECTROCARDIOGRAM COMPLETE: CPT | Performed by: PHYSICIAN ASSISTANT

## 2020-07-05 NOTE — PROGRESS NOTES
"Mike Schultz is a 69 year old male who is being evaluated via a billable video visit.      The patient has been notified of following:     \"This video visit will be conducted via a call between you and your physician/provider. We have found that certain health care needs can be provided without the need for an in-person physical exam.  This service lets us provide the care you need with a video conversation.  If a prescription is necessary we can send it directly to your pharmacy.  If lab work is needed we can place an order for that and you can then stop by our lab to have the test done at a later time.    Video visits are billed at different rates depending on your insurance coverage.  Please reach out to your insurance provider with any questions.    If during the course of the call the physician/provider feels a video visit is not appropriate, you will not be charged for this service.\"    BP: N/A  HR: N/A  Weight: 269lb    Review Of Systems  Skin: NEGATIVE  Eyes:Ears/Nose/Throat: NEGATIVE  Respiratory:sleep apnea, wears cpap  Cardiovascular: energy level good  Gastrointestinal: NEGATIVE  Genitourinary  :urinary frequency   Musculoskeletal: NEGATIVE  Neurologic: NEGATIVE  Psychiatric: NEGATIVE  Hematologic/Lymphatic/Immunologic: NEGATIVE  Endocrine:  NEGATIVE    Patsy Lara LPN    Patient has given verbal consent for Video visit? Yes  How would you like to obtain your AVS? Jose  Patient would like the video invitation sent by: Text to cell phone: [phone] [email or web address]  Will anyone else be joining your video visit? No      CC:  Atrial Fibrillation    VITALS:  269#    BRIEF HPI:  70 yo M who sees Dr. Day for his h/o:    1. AFib dx'd in 2012.to h Failed flecainide (and noted ave scar on MRI). Underwent PVI and SVC isolation 11/2017. Repeat PVI and CTI for inducible AFlutter 4/2019  2. Preserved EF based on echo 10/2017 and EF 51% on cMRI 10/2017  3. Benign Essential HTN   4. BOWEN  5. Myocardiac Scar noted " on cardiac MRI 10/2017, consistent with prior MI in the left Cx territory. EF 51%.  Avoiding flecainide use  6. H/o Alcohol dependence, no EtOH use since 4/2019!!   5. Chronic AC  On Xarelto for CHADSVASc 2 (HTN, age)    I saw Brayden 1/2020 at which time he was doing well. He had lost ~30# and had remained off of EtOH since 4/2019. Due to SB, I asked him to hold metoprolol. I asked to see him back in 6 months with repeat lipids.    INTERVAL HISTORY:  Really doing well. He checks his pulse quite often and notes it's very regular. No fluid overload/ankle edema. Wondering if he can reduce his Lasix/KCl doses. No orthopnea/PND. Weight stable ~269-272#    Continues to work out 1-2 times/day so is frustrated he's not losing weight. No alcohol since 4/2019. Admits to carb-loading the weekend and night before he had his blood work taken.    No CP, dizziness, palpitations. Overall, feeling really good.     DIAGNOSTICS:  EKG 7/1/2020 showed SR 64 bpm with NSTW changes  Echocardiogram 3/2019 showed EF of 55-60%.  No regional wall motion abnormalities were noted.  He had 1+ mitral regurgitation and 1+ tricuspid regurgitation.  RV was normal in structure, function and size.  Aortic valve showed just trace to mild aortic regurgitation with aortic sclerosis.  Mean gradient was 4.6 mmHg (improved from previous) volume index is 46.7 mL/m  with LA dimension of 4.9 cm.  Cardiac MRI 10/2017 showed an EF of 51% with mild hypokinesis involving the basal inferolateral segment.  Evidence of previous myocardial infarction in the circumflex territory was noted.  Component      Latest Ref Rng & Units 2/25/2019 1/3/2020 6/22/2020   Cholesterol      <200 mg/dL 193 163 164   Triglycerides      <150 mg/dL 165 (H) 183 (H) 184 (H)   HDL Cholesterol      >39 mg/dL 45 42 32 (L)   LDL Cholesterol Calculated      <100 mg/dL 115 (H) 84 95   Non HDL Cholesterol      <130 mg/dL 148 (H) 121 132 (H)     Component ALT   Latest Ref Rng & Units 0 - 70 U/L    1/3/2020 35   6/22/2020 31     Component      Latest Ref Rng & Units 1/3/2020 6/22/2020   Sodium      133 - 144 mmol/L 139 141   Potassium      3.4 - 5.3 mmol/L 3.9 3.6   Chloride      94 - 109 mmol/L 105 109   Carbon Dioxide      20 - 32 mmol/L 28 27   Anion Gap      3 - 14 mmol/L 6 5   Glucose      70 - 99 mg/dL 100 (H) 126 (H)   Urea Nitrogen      7 - 30 mg/dL 27 20   Creatinine      0.66 - 1.25 mg/dL 1.02 0.89   GFR Estimate      >60 mL/min/1.73:m2 75 87   GFR Estimate If Black      >60 mL/min/1.73:m2 87 >90   Calcium      8.5 - 10.1 mg/dL 8.7 8.6       REVIEW OF SYSTEMS:  Negative except as noted above     PHYSICAL EXAM:  GEN: NAD. Upright. Obese body habitus  ENT/Mouth: Atraumatic and normocephalic  Eyes: No scleral icterus; normal conjunctivae  Neck: No observed thyromegaly  Chest/Lungs: No audible wheezing or cough; equal chest wall expansion. Non-labored breathing  Extremities: No cyanosis or clubbing observed  Skin: No visible rashes. Normal skin color  Neuro: Normal Arm motion bilaterally. No tremors. No visible evidence of focal defects  Psychiatric: A/O. Calm demeanor    ASSESSMENT/PLAN:    1. Atrial Fibrillation; preserved EF    As above, underwent PVI x 2. His EKG from last week continues to show SR    Does not think he's had any recurrent AFib. Typically, he develops fluid overload with this    Remains on AC for CHADSVASc2 (HTN, age)    He remains off of alcohol     PLAN:    Continue to avoid alcohol    Continue to monitor HR and for arrhythmia. Will continue metoprolol tartraet 25 mg BID and Xarelto 20 mg daily    2. Dyslipidemia    Cardiac MRI with scar c/w previous MI in Cx territory    Lipids as above. HDL has dropped despite working out a lot. He thinks trigs were high b/c ate more carbs the weekend and night before.     PLAN:    Crestor 10 mg daily    Continue diet/exercise changes    Repeat lipids (fasting) in 4-6 weeks with video visit following    3. Fluid overload hx    Has h/o this with  AFib    Now taking Lasix 40 mg daily and KCl 30 mEq daily    No fluid overload that he's aware of now     PLAN:    Decrease Lasix to 20 mg daily and KCl to 20 mEq daily as with resumption of SR, likely will not need as much diuretic    BMP in ~4 week    I have reviewed the note as documented above.  This accurately captures the substance of my conversation with the patient.      Video-Visit Details    Type of service:  Video Visit    Video Start Time: 1312  Video End Time: 1335  Duration 23 minutes    Originating Location (pt. Location): Home    Distant Location (provider location):  [facility]     Platform used for Video Visit: Connie Campos PA-C

## 2020-07-06 ENCOUNTER — VIRTUAL VISIT (OUTPATIENT)
Dept: CARDIOLOGY | Facility: CLINIC | Age: 70
End: 2020-07-06
Attending: PHYSICIAN ASSISTANT
Payer: COMMERCIAL

## 2020-07-06 DIAGNOSIS — R60.0 PERIPHERAL EDEMA: ICD-10-CM

## 2020-07-06 DIAGNOSIS — I48.19 PERSISTENT ATRIAL FIBRILLATION (H): ICD-10-CM

## 2020-07-06 DIAGNOSIS — E78.5 DYSLIPIDEMIA: ICD-10-CM

## 2020-07-06 PROCEDURE — 99214 OFFICE O/P EST MOD 30 MIN: CPT | Mod: 95 | Performed by: PHYSICIAN ASSISTANT

## 2020-07-06 RX ORDER — POTASSIUM CHLORIDE 750 MG/1
20 TABLET, EXTENDED RELEASE ORAL DAILY
Start: 2020-07-06 | End: 2020-09-28

## 2020-07-06 RX ORDER — FUROSEMIDE 20 MG
20 TABLET ORAL DAILY
Start: 2020-07-06 | End: 2020-09-15

## 2020-07-06 RX ORDER — ROSUVASTATIN CALCIUM 10 MG/1
10 TABLET, COATED ORAL DAILY
Qty: 30 TABLET | Refills: 4 | Status: SHIPPED | OUTPATIENT
Start: 2020-07-06 | End: 2020-08-17

## 2020-07-06 NOTE — PATIENT INSTRUCTIONS
Brayden - it was great to speak with you today!  I'm glad you're doing so well and thrilled you've remained off of alcohol    1. Reviewed EKG from 7/1 that showed normal rhythm  2. Reviewed blood work from late June, including elevated triglycerides (stable), drop in good cholesterol/HDL and increase in bad cholesterol/LDL despite your excellent workout habits.    PLAN:  1. Tighten up carbohydrates in diet  2. Continue excellent exercise habits  3. Start Crestor (rosuvastatin) 10 mg daily. I have sent new Rx in  4. Get repeat FASTING labs in ~4-5 weeks  5. Decrease furosemide to 20 mg daily in AM and potassium to 20 mEq daily   Pay attention to fluid retention/ankle swelling/rapid weight gain, etc  6. Video visit with me again ~5-6 weeks to review but CALL if issues prior!  Especially if you think you need to restart higher dose of Lasix    HAROLDO Rivas  My nurses are Mervat Hidalgo and Caitlin: [phone]

## 2020-07-06 NOTE — LETTER
7/6/2020    Johnny Kay MD  [address] [facility] [address]    RE: Mike Schultz       Dear Colleague,    I had the pleasure of seeing Mike Schultz in the [facility].    Mike Schultz is a 69 year old male who is being evaluated via a billable video visit.        CC:  Atrial Fibrillation    VITALS:  269#    BRIEF HPI:  68 yo M who sees Dr. Day for his h/o:    1. AFib dx'd in 2012.to h Failed flecainide (and noted ave scar on MRI). Underwent PVI and SVC isolation 11/2017. Repeat PVI and CTI for inducible AFlutter 4/2019  2. Preserved EF based on echo 10/2017 and EF 51% on cMRI 10/2017  3. Benign Essential HTN   4. BOWEN  5. Myocardiac Scar noted on cardiac MRI 10/2017, consistent with prior MI in the left Cx territory. EF 51%.  Avoiding flecainide use  6. H/o Alcohol dependence, no EtOH use since 4/2019!!   5. Chronic AC  On Xarelto for CHADSVASc 2 (HTN, age)    I saw Brayden 1/2020 at which time he was doing well. He had lost ~30# and had remained off of EtOH since 4/2019. Due to SB, I asked him to hold metoprolol. I asked to see him back in 6 months with repeat lipids.    INTERVAL HISTORY:  Really doing well. He checks his pulse quite often and notes it's very regular. No fluid overload/ankle edema. Wondering if he can reduce his Lasix/KCl doses. No orthopnea/PND. Weight stable ~269-272#    Continues to work out 1-2 times/day so is frustrated he's not losing weight. No alcohol since 4/2019. Admits to carb-loading the weekend and night before he had his blood work taken.    No CP, dizziness, palpitations. Overall, feeling really good.     DIAGNOSTICS:  EKG 7/1/2020 showed SR 64 bpm with NSTW changes  Echocardiogram 3/2019 showed EF of 55-60%.  No regional wall motion abnormalities were noted.  He had 1+ mitral regurgitation and 1+ tricuspid regurgitation.  RV was normal in structure, function and size.  Aortic valve showed just trace to mild aortic regurgitation with  aortic sclerosis.  Mean gradient was 4.6 mmHg (improved from previous) volume index is 46.7 mL/m  with LA dimension of 4.9 cm.  Cardiac MRI 10/2017 showed an EF of 51% with mild hypokinesis involving the basal inferolateral segment.  Evidence of previous myocardial infarction in the circumflex territory was noted.  Component      Latest Ref Rng & Units 2/25/2019 1/3/2020 6/22/2020   Cholesterol      <200 mg/dL 193 163 164   Triglycerides      <150 mg/dL 165 (H) 183 (H) 184 (H)   HDL Cholesterol      >39 mg/dL 45 42 32 (L)   LDL Cholesterol Calculated      <100 mg/dL 115 (H) 84 95   Non HDL Cholesterol      <130 mg/dL 148 (H) 121 132 (H)     Component ALT   Latest Ref Rng & Units 0 - 70 U/L   1/3/2020 35   6/22/2020 31     Component      Latest Ref Rng & Units 1/3/2020 6/22/2020   Sodium      133 - 144 mmol/L 139 141   Potassium      3.4 - 5.3 mmol/L 3.9 3.6   Chloride      94 - 109 mmol/L 105 109   Carbon Dioxide      20 - 32 mmol/L 28 27   Anion Gap      3 - 14 mmol/L 6 5   Glucose      70 - 99 mg/dL 100 (H) 126 (H)   Urea Nitrogen      7 - 30 mg/dL 27 20   Creatinine      0.66 - 1.25 mg/dL 1.02 0.89   GFR Estimate      >60 mL/min/1.73:m2 75 87   GFR Estimate If Black      >60 mL/min/1.73:m2 87 >90   Calcium      8.5 - 10.1 mg/dL 8.7 8.6       REVIEW OF SYSTEMS:  Negative except as noted above     PHYSICAL EXAM:  GEN: NAD. Upright. Obese body habitus  ENT/Mouth: Atraumatic and normocephalic  Eyes: No scleral icterus; normal conjunctivae  Neck: No observed thyromegaly  Chest/Lungs: No audible wheezing or cough; equal chest wall expansion. Non-labored breathing  Extremities: No cyanosis or clubbing observed  Skin: No visible rashes. Normal skin color  Neuro: Normal Arm motion bilaterally. No tremors. No visible evidence of focal defects  Psychiatric: A/O. Calm demeanor    ASSESSMENT/PLAN:    1. Atrial Fibrillation; preserved EF    As above, underwent PVI x 2. His EKG from last week continues to show SR    Does not  think he's had any recurrent AFib. Typically, he develops fluid overload with this    Remains on AC for CHADSVASc2 (HTN, age)    He remains off of alcohol     PLAN:    Continue to avoid alcohol    Continue to monitor HR and for arrhythmia. Will continue metoprolol tartraet 25 mg BID and Xarelto 20 mg daily    2. Dyslipidemia    Cardiac MRI with scar c/w previous MI in Cx territory    Lipids as above. HDL has dropped despite working out a lot. He thinks trigs were high b/c ate more carbs the weekend and night before.     PLAN:    Crestor 10 mg daily    Continue diet/exercise changes    Repeat lipids (fasting) in 4-6 weeks with video visit following    3. Fluid overload hx    Has h/o this with AFib    Now taking Lasix 40 mg daily and KCl 30 mEq daily    No fluid overload that he's aware of now     PLAN:    Decrease Lasix to 20 mg daily and KCl to 20 mEq daily as with resumption of SR, likely will not need as much diuretic    BMP in ~4 week    I have reviewed the note as documented above.  This accurately captures the substance of my conversation with the patient.      Thank you for allowing me to participate in the care of your patient.    Sincerely,     Natalie Campos PA-C     [facility]

## 2020-07-06 NOTE — TELEPHONE ENCOUNTER
Routing refill request to provider for review/approval because:      Appointment scheduled 9/3/2020 with Dr. Kay.

## 2020-07-09 RX ORDER — FINASTERIDE 1 MG/1
1 TABLET, FILM COATED ORAL DAILY
Qty: 30 TABLET | Refills: 0 | Status: SHIPPED | OUTPATIENT
Start: 2020-07-09 | End: 2020-08-13

## 2020-08-11 DIAGNOSIS — L65.9 ALOPECIA: ICD-10-CM

## 2020-08-12 NOTE — TELEPHONE ENCOUNTER
Routing refill request to provider for review/approval because:  Patient needs to be seen because it has been more than 1 year since last office visit.  Refill request is for Imiquimod, 5-Fluorouracil, or Finasteride

## 2020-08-13 ENCOUNTER — TELEPHONE (OUTPATIENT)
Dept: CARDIOLOGY | Facility: CLINIC | Age: 70
End: 2020-08-13

## 2020-08-13 RX ORDER — FINASTERIDE 1 MG/1
1 TABLET, FILM COATED ORAL DAILY
Qty: 90 TABLET | Refills: 3 | Status: SHIPPED | OUTPATIENT
Start: 2020-08-13 | End: 2021-08-06

## 2020-08-14 DIAGNOSIS — I48.19 PERSISTENT ATRIAL FIBRILLATION (H): ICD-10-CM

## 2020-08-14 DIAGNOSIS — E78.5 DYSLIPIDEMIA: ICD-10-CM

## 2020-08-14 LAB
ALT SERPL W P-5'-P-CCNC: 6 U/L (ref 5–30)
ANION GAP SERPL CALCULATED.3IONS-SCNC: 13.7 MMOL/L (ref 6–17)
BUN SERPL-MCNC: 21 MG/DL (ref 7–30)
CALCIUM SERPL-MCNC: 9 MG/DL (ref 8.5–10.5)
CHLORIDE SERPL-SCNC: 105 MMOL/L (ref 98–107)
CHOLEST SERPL-MCNC: 163 MG/DL
CO2 SERPL-SCNC: 27 MMOL/L (ref 23–29)
CREAT SERPL-MCNC: 1.07 MG/DL (ref 0.7–1.3)
GFR SERPL CREATININE-BSD FRML MDRD: 69 ML/MIN/{1.73_M2}
GLUCOSE SERPL-MCNC: 114 MG/DL (ref 70–105)
HDLC SERPL-MCNC: 37 MG/DL
LDLC SERPL CALC-MCNC: 85 MG/DL
NONHDLC SERPL-MCNC: 126 MG/DL
POTASSIUM SERPL-SCNC: 3.7 MMOL/L (ref 3.5–5.1)
SODIUM SERPL-SCNC: 142 MMOL/L (ref 136–145)
TRIGL SERPL-MCNC: 206 MG/DL

## 2020-08-14 PROCEDURE — 80061 LIPID PANEL: CPT | Performed by: PHYSICIAN ASSISTANT

## 2020-08-14 PROCEDURE — 36415 COLL VENOUS BLD VENIPUNCTURE: CPT | Performed by: PHYSICIAN ASSISTANT

## 2020-08-14 PROCEDURE — 80048 BASIC METABOLIC PNL TOTAL CA: CPT | Performed by: PHYSICIAN ASSISTANT

## 2020-08-14 PROCEDURE — 84460 ALANINE AMINO (ALT) (SGPT): CPT | Performed by: PHYSICIAN ASSISTANT

## 2020-08-16 NOTE — PROGRESS NOTES
"Mike Schultz is a 69 year old male who is being evaluated via a billable video visit.  ** Switched to telephone **    The patient has been notified of following:     \"This video visit will be conducted via a call between you and your physician/provider. We have found that certain health care needs can be provided without the need for an in-person physical exam.  This service lets us provide the care you need with a video conversation.  If a prescription is necessary we can send it directly to your pharmacy.  If lab work is needed we can place an order for that and you can then stop by our lab to have the test done at a later time.    Video visits are billed at different rates depending on your insurance coverage.  Please reach out to your insurance provider with any questions.    If during the course of the call the physician/provider feels a video visit is not appropriate, you will not be charged for this service.\"    Patient has given verbal consent for Video visit? Yes  How would you like to obtain your AVS? MyChart  If you are dropped from the video visit, the video invite should be resent to: Send to e-mail at: [email or web address]  Will anyone else be joining your video visit? No       Review Of Systems  Skin: NEGATIVE  Eyes:Ears/Nose/Throat: NEGATIVE  Respiratory: no issues  Cardiovascular: no issues  Gastrointestinal: NEGATIVE  Genitourinary:NEGATIVE   Musculoskeletal: NEGATIVE  Neurologic: NEGATIVE  Psychiatric: NEGATIVE  Hematologic/Lymphatic/Immunologic: NEGATIVE  Endocrine:  NEGATIVE  Vitals   BP 1 mth ago approx 130/85  Pulse 57  Weight 267lbs      Telephone number of patient: [phone]    CC:  Dyslipidemia    VITALS:  130/85  HR 57 bpm  Weight 267# (265# on 1/3/2020 in clinic)    BRIEF HPI:  Brayden is a 69 year old yo M who sees Dr. Day for h/o:    1. AFib dx'd in 2012. Failed flecainide (and noted scar on MRI). Underwent PVI and SVC isolation 11/2017. Repeat PVI and CTI for " inducible AFlutter 4/2019. Per Dr. Day, would recommend Xarelto at least until 4/2021 (2 years post last ablation)  2. Preserved EF based on echo 10/2017 and EF 51% on cMRI 10/2017  3. Benign Essential HTN   4. BOWEN  5. Myocardiac Scar noted on cardiac MRI 10/2017, consistent with prior MI in the left Cx territory. EF 51%.  Avoiding flecainide use  6. H/o Alcohol dependence, no EtOH use since 4/2019!!   5. Chronic AC  On Xarelto for CHADSVASc 2 (HTN, age)    I had a virtual visit with Brayden 7/2020 at which time he remained in SR by EKG. He continued to complain that he wasn't losing weight despite good exercise habits. Weights were stable 269-272# and I decreased Lasix to 20 mg daily and KCl 20 mEq daily with plans for BMP and lipids following.       INTERVAL HISTORY:  Since reducing the Lasix and potassium hasn't gained weight. Really feels like this dose is working well.    Continues to work out routinely and remains at stable weight, which frustrates him.    Has a watch that shows rhythm/rate - HR typically 50s at rest.      DIAGNOSTICS:  EKG 7/1/2020 showed SR 64 bpm with NSTW changes  Echocardiogram 3/2019 showed EF of 55-60%.  No regional wall motion abnormalities were noted.  He had 1+ mitral regurgitation and 1+ tricuspid regurgitation.  RV was normal in structure, function and size.  Aortic valve showed just trace to mild aortic regurgitation with aortic sclerosis.  Mean gradient was 4.6 mmHg (improved from previous) volume index is 46.7 mL/m  with LA dimension of 4.9 cm.  Cardiac MRI 10/2017 showed an EF of 51% with mild hypokinesis involving the basal inferolateral segment.  Evidence of previous myocardial infarction in the circumflex territory was noted.  Component      Latest Ref Rng & Units 1/3/2020 6/22/2020 8/14/2020   Sodium      136 - 145 mmol/L 139 141 142   Potassium      3.5 - 5.1 mmol/L 3.9 3.6 3.7   Chloride      98 - 107 mmol/L 105 109 105   Carbon Dioxide      23 - 29 mmol/L 28 27 27   Anion  Gap      6 - 17 mmol/L 6 5 13.7   Glucose      70 - 105 mg/dL 100 (H) 126 (H) 114 (H)   Urea Nitrogen      7 - 30 mg/dL 27 20 21   Creatinine      0.70 - 1.30 mg/dL 1.02 0.89 1.07   GFR Estimate      >60 mL/min/1.73:m2 75 87 69   GFR Estimate If Black      >60 mL/min/1.73:m2 87 >90 83   Calcium      8.5 - 10.5 mg/dL 8.7 8.6 9.0     Component      Latest Ref Rng & Units 1/3/2020 6/22/2020 8/14/2020   Cholesterol      <200 mg/dL 163 164 163   Triglycerides      <150 mg/dL 183 (H) 184 (H) 206 (H)   HDL Cholesterol      >39 mg/dL 42 32 (L) 37 (L)   LDL Cholesterol Calculated      <100 mg/dL 84 95 85   Non HDL Cholesterol      <130 mg/dL 121 132 (H) 126   ALT      5 - 30 U/L  31 6     REVIEW OF SYSTEMS:  Negative except as noted above     PHYSICAL EXAM:  Deferred, telephone      ASSESSMENT/PLAN:    1. Dyslipidemia    Cardiac MRI with scar c/w previous MI in Cx territory    LDL improved, HDL improved but trigs bumped again. Never started Crestor      PLAN:    Really focus on carbs/sugars in diet. He's not interested in statin at this time    Visit with me 3 m with lipids/BMP      2. AFib; preserved EF    As above, s/p PVI x 2. EKG 7/2020 showed SR.  CHADSVASc 2 (HTN, age) and remains on Xarelto      No evidence fluid overload, which he typically developed with AFib despite reducing Lasix from 40 to 20 mg daily and KCL from 30 to 20 mEq daily. Weight is stable    BMP looks good       PLAN:    I will check with Dr. Day about how long he's to stay on Xarelto given no EtOH use and fact he's remained in SR since repeat 4/2019    ADDENDUM 8/18/2020: [place] back from Dr. Day. Given need for repeat ablation, would recommend Xarelto for at least another year.       CURRENT MEDICATIONS:  Current Outpatient Medications   Medication Sig Dispense Refill     allopurinol (ZYLOPRIM) 300 MG tablet Take 1 tablet (300 mg) by mouth daily 90 tablet 0     amLODIPine (NORVASC) 2.5 MG tablet Take 1 tablet (2.5 mg) by mouth daily 90 tablet 1      cetirizine (ZYRTEC) 10 MG tablet Take 10 mg by mouth as needed for allergies       finasteride (PROPECIA) 1 MG tablet Take 1 tablet (1 mg) by mouth daily 90 tablet 3     furosemide (LASIX) 20 MG tablet Take 1 tablet (20 mg) by mouth daily       losartan (COZAAR) 100 MG tablet Take 1 tablet (100 mg) by mouth daily 90 tablet 0     metoprolol tartrate (LOPRESSOR) 50 MG tablet Take 0.5 tablets (25 mg) by mouth 2 times daily       order for DME Equipment ordered: RESMED CPAP Mask type: Full face  Settings: 15       potassium chloride ER (K-TAB/KLOR-CON) 10 MEQ CR tablet Take 2 tablets (20 mEq) by mouth daily       XARELTO 20 MG TABS tablet Take 1 tablet by mouth daily with dinner. Avoid use of alcohol while using medication. 90 tablet 3         ORDERS PLACED:  Orders Placed This Encounter   Procedures     Basic metabolic panel     Lipid Profile     ALT     Follow-Up with Cardiac Advanced Practice Provider     No orders of the defined types were placed in this encounter.    Medications Discontinued During This Encounter   Medication Reason     rosuvastatin (CRESTOR) 10 MG tablet Not filled/taken by Patient         Encounter Diagnoses   Name Primary?     Essential hypertension Yes     Persistent atrial fibrillation (H)      Dyslipidemia          ALLERGIES     Allergies   Allergen Reactions     No Known Drug Allergies        PAST MEDICAL HISTORY:  Past Medical History:   Diagnosis Date     Essential hypertension, benign      H/O ETOH abuse      Herpes simplex without mention of complication      Hyperlipidemia      Obesity      Persistent atrial fibrillation (H)     persistent, DCCV 12/2017, ablation 11/6/17     Tobacco use disorder     dced 6/2004       PAST SURGICAL HISTORY:  Past Surgical History:   Procedure Laterality Date     ANESTHESIA CARDIOVERSION N/A 2/26/2019    Procedure: ANESTHESIA CARDIOVERSION (PRIETO);  Surgeon: GENERIC ANESTHESIA PROVIDER;  Location: [facility] OR     C NONSPECIFIC PROCEDURE  1997    achilles tendon      C NONSPECIFIC PROCEDURE      knees, arthroscopy     C NONSPECIFIC PROCEDURE      basal cell skin ca, nose     C NONSPECIFIC PROCEDURE      flex sig; colonoscopy due 3/2008     C NONSPECIFIC PROCEDURE      cardioversion for atr fib     C TOTAL KNEE ARTHROPLASTY Left 2018    Dr. Malick Suarez     EP ABLATION FOCAL AFIB N/A 2019    Procedure: EP Ablation Focal AFIB;  Surgeon: Fady Day MD;  Location:  HEART CARDIAC CATH LAB     HERNIORRHAPHY UMBILICAL  2012    Procedure: HERNIORRHAPHY UMBILICAL;  UMBILICAL HERNIA REPAIR;  Surgeon: Ra Crocker MD;  Location: [facility]     ORTHOPEDIC SURGERY         FAMILY HISTORY:  Family History   Problem Relation Age of Onset     Family History Negative Mother      Heart Disease Father         decesased at 42 - MI     Heart Disease Sister          MI     Heart Disease Brother          MI     Heart Disease Brother         CABG AT 49     Lipids Sister      Lipids Sister      Lipids Sister      Family History Negative Brother        SOCIAL HISTORY:  Social History     Socioeconomic History     Marital status: Single     Spouse name: None     Number of children: None     Years of education: None     Highest education level: None   Occupational History     None   Social Needs     Financial resource strain: None     Food insecurity     Worry: None     Inability: None     Transportation needs     Medical: None     Non-medical: None   Tobacco Use     Smoking status: Former Smoker     Packs/day: 1.00     Years: 10.00     Pack years: 10.00     Types: Cigarettes     Start date:      Last attempt to quit: 2011     Years since quittin.0     Smokeless tobacco: Never Used   Substance and Sexual Activity     Alcohol use: No     Frequency: Never     Drug use: No     Sexual activity: Yes     Partners: Female   Lifestyle     Physical activity     Days per week: None     Minutes per session: None     Stress: None   Relationships     Social  connections     Talks on phone: None     Gets together: None     Attends Sikhism service: None     Active member of club or organization: None     Attends meetings of clubs or organizations: None     Relationship status: None     Intimate partner violence     Fear of current or ex partner: None     Emotionally abused: None     Physically abused: None     Forced sexual activity: None   Other Topics Concern     Parent/sibling w/ CABG, MI or angioplasty before 65F 55M? Not Asked   Social History Narrative     None       Video-Visit Details ** switched to telephone **    Call Start Time: 1522  Call End Time: 1548  Duration 26 minutes

## 2020-08-17 ENCOUNTER — DOCUMENTATION ONLY (OUTPATIENT)
Dept: CARDIOLOGY | Facility: CLINIC | Age: 70
End: 2020-08-17

## 2020-08-17 ENCOUNTER — VIRTUAL VISIT (OUTPATIENT)
Dept: CARDIOLOGY | Facility: CLINIC | Age: 70
End: 2020-08-17
Attending: PHYSICIAN ASSISTANT
Payer: COMMERCIAL

## 2020-08-17 VITALS — WEIGHT: 267 LBS | BODY MASS INDEX: 41.82 KG/M2 | HEART RATE: 57 BPM

## 2020-08-17 DIAGNOSIS — E78.5 DYSLIPIDEMIA: ICD-10-CM

## 2020-08-17 DIAGNOSIS — I10 ESSENTIAL HYPERTENSION: Primary | ICD-10-CM

## 2020-08-17 DIAGNOSIS — I48.19 PERSISTENT ATRIAL FIBRILLATION (H): ICD-10-CM

## 2020-08-17 PROCEDURE — 99213 OFFICE O/P EST LOW 20 MIN: CPT | Mod: 95 | Performed by: PHYSICIAN ASSISTANT

## 2020-08-17 NOTE — PATIENT INSTRUCTIONS
Brayden - it was nice to speak with you today!    1. Reviewed that you're still doing well from an AFib standpoint  2. Reviewed labs - blood sugar look better but still high. Cholesterol levels reviewed. LDL still higher than we'd like, but better. HDL better, but still a little low.  Trigs have gotten too high and reviewed diet    PLAN:  1. Cholesterol recheck in 3 months with video visit to review.   For TRIGLYCERIDES (goal <150 mg/dL):   Decrease carbohydrates in your diet    Limit desserts, candy, sweets    Limit chips, crackers   Eat fatty fish (salmon, herring, etc) twice/week    [phone] if any questions/concerns

## 2020-08-17 NOTE — PROGRESS NOTES
Dr. Day?  Ok to stop Xarelto??    1. PVI/SVC isolation 11/2017. Increased alcohol led to HF and AFib - underwent PVI and CTI for inducible AFlutter 4/2019    2. He's not drank since 4/2019; works out twice daily and checks HR mutliple times/day without AFib recurrence.    3. EKG 7/2020 with SR.    CHADSVASc 2 (HTN, age).    Would you consider stopping Xarelto in favor of ASA 81 mg at this time??    Crystal

## 2020-08-17 NOTE — LETTER
8/17/2020    Johnny Kay MD  [address] [facility] [address]    RE: Mike Schultz       Dear Colleague,    I had the pleasure of seeing Mike Schultz in the [facility].    Mike Schultz is a 69 year old male who is being evaluated via a billable video visit.  ** Switched to telephone **    CC:  Dyslipidemia    VITALS:  130/85  HR 57 bpm  Weight 267# (265# on 1/3/2020 in clinic)    BRIEF HPI:  Brayden is a 69 year old yo M who sees Dr. Day for h/o:    1. AFib dx'd in 2012. Failed flecainide (and noted scar on MRI). Underwent PVI and SVC isolation 11/2017. Repeat PVI and CTI for inducible AFlutter 4/2019. Per Dr. Day, would recommend Xarelto at least until 4/2021 (2 years post last ablation)  2. Preserved EF based on echo 10/2017 and EF 51% on cMRI 10/2017  3. Benign Essential HTN   4. BOWEN  5. Myocardiac Scar noted on cardiac MRI 10/2017, consistent with prior MI in the left Cx territory. EF 51%.  Avoiding flecainide use  6. H/o Alcohol dependence, no EtOH use since 4/2019!!   5. Chronic AC  On Xarelto for CHADSVASc 2 (HTN, age)    I had a virtual visit with Brayden 7/2020 at which time he remained in SR by EKG. He continued to complain that he wasn't losing weight despite good exercise habits. Weights were stable 269-272# and I decreased Lasix to 20 mg daily and KCl 20 mEq daily with plans for BMP and lipids following.       INTERVAL HISTORY:  Since reducing the Lasix and potassium hasn't gained weight. Really feels like this dose is working well.    Continues to work out routinely and remains at stable weight, which frustrates him.    Has a watch that shows rhythm/rate - HR typically 50s at rest.      DIAGNOSTICS:  EKG 7/1/2020 showed SR 64 bpm with NSTW changes  Echocardiogram 3/2019 showed EF of 55-60%.  No regional wall motion abnormalities were noted.  He had 1+ mitral regurgitation and 1+ tricuspid regurgitation.  RV was normal in structure, function and size.   Aortic valve showed just trace to mild aortic regurgitation with aortic sclerosis.  Mean gradient was 4.6 mmHg (improved from previous) volume index is 46.7 mL/m  with LA dimension of 4.9 cm.  Cardiac MRI 10/2017 showed an EF of 51% with mild hypokinesis involving the basal inferolateral segment.  Evidence of previous myocardial infarction in the circumflex territory was noted.  Component      Latest Ref Rng & Units 1/3/2020 6/22/2020 8/14/2020   Sodium      136 - 145 mmol/L 139 141 142   Potassium      3.5 - 5.1 mmol/L 3.9 3.6 3.7   Chloride      98 - 107 mmol/L 105 109 105   Carbon Dioxide      23 - 29 mmol/L 28 27 27   Anion Gap      6 - 17 mmol/L 6 5 13.7   Glucose      70 - 105 mg/dL 100 (H) 126 (H) 114 (H)   Urea Nitrogen      7 - 30 mg/dL 27 20 21   Creatinine      0.70 - 1.30 mg/dL 1.02 0.89 1.07   GFR Estimate      >60 mL/min/1.73:m2 75 87 69   GFR Estimate If Black      >60 mL/min/1.73:m2 87 >90 83   Calcium      8.5 - 10.5 mg/dL 8.7 8.6 9.0     Component      Latest Ref Rng & Units 1/3/2020 6/22/2020 8/14/2020   Cholesterol      <200 mg/dL 163 164 163   Triglycerides      <150 mg/dL 183 (H) 184 (H) 206 (H)   HDL Cholesterol      >39 mg/dL 42 32 (L) 37 (L)   LDL Cholesterol Calculated      <100 mg/dL 84 95 85   Non HDL Cholesterol      <130 mg/dL 121 132 (H) 126   ALT      5 - 30 U/L  31 6     REVIEW OF SYSTEMS:  Negative except as noted above     PHYSICAL EXAM:  Deferred, telephone      ASSESSMENT/PLAN:    1. Dyslipidemia    Cardiac MRI with scar c/w previous MI in Cx territory    LDL improved, HDL improved but trigs bumped again. Never started Crestor      PLAN:    Really focus on carbs/sugars in diet. He's not interested in statin at this time    Visit with me 3 m with lipids/BMP      2. AFib; preserved EF    As above, s/p PVI x 2. EKG 7/2020 showed SR.  CHADSVASc 2 (HTN, age) and remains on Xarelto      No evidence fluid overload, which he typically developed with AFib despite reducing Lasix from 40  to 20 mg daily and KCL from 30 to 20 mEq daily. Weight is stable    BMP looks good       PLAN:    I will check with Dr. Day about how long he's to stay on Xarelto given no EtOH use and fact he's remained in SR since repeat 4/2019    ADDENDUM 8/18/2020: [place] back from Dr. Day. Given need for repeat ablation, would recommend Xarelto for at least another year.       CURRENT MEDICATIONS:  Current Outpatient Medications   Medication Sig Dispense Refill     allopurinol (ZYLOPRIM) 300 MG tablet Take 1 tablet (300 mg) by mouth daily 90 tablet 0     amLODIPine (NORVASC) 2.5 MG tablet Take 1 tablet (2.5 mg) by mouth daily 90 tablet 1     cetirizine (ZYRTEC) 10 MG tablet Take 10 mg by mouth as needed for allergies       finasteride (PROPECIA) 1 MG tablet Take 1 tablet (1 mg) by mouth daily 90 tablet 3     furosemide (LASIX) 20 MG tablet Take 1 tablet (20 mg) by mouth daily       losartan (COZAAR) 100 MG tablet Take 1 tablet (100 mg) by mouth daily 90 tablet 0     metoprolol tartrate (LOPRESSOR) 50 MG tablet Take 0.5 tablets (25 mg) by mouth 2 times daily       order for DME Equipment ordered: RESMED CPAP Mask type: Full face  Settings: 15       potassium chloride ER (K-TAB/KLOR-CON) 10 MEQ CR tablet Take 2 tablets (20 mEq) by mouth daily       XARELTO 20 MG TABS tablet Take 1 tablet by mouth daily with dinner. Avoid use of alcohol while using medication. 90 tablet 3         ORDERS PLACED:  Orders Placed This Encounter   Procedures     Basic metabolic panel     Lipid Profile     ALT     Follow-Up with Cardiac Advanced Practice Provider     No orders of the defined types were placed in this encounter.    Medications Discontinued During This Encounter   Medication Reason     rosuvastatin (CRESTOR) 10 MG tablet Not filled/taken by Patient         Encounter Diagnoses   Name Primary?     Essential hypertension Yes     Persistent atrial fibrillation (H)      Dyslipidemia          ALLERGIES     Allergies   Allergen Reactions     No  Known Drug Allergies        PAST MEDICAL HISTORY:  Past Medical History:   Diagnosis Date     Essential hypertension, benign      H/O ETOH abuse      Herpes simplex without mention of complication      Hyperlipidemia      Obesity      Persistent atrial fibrillation (H)     persistent, DCCV 2017, ablation 17     Tobacco use disorder     dced 2004       PAST SURGICAL HISTORY:  Past Surgical History:   Procedure Laterality Date     ANESTHESIA CARDIOVERSION N/A 2019    Procedure: ANESTHESIA CARDIOVERSION (PRIETO);  Surgeon: GENERIC ANESTHESIA PROVIDER;  Location:  OR     C NONSPECIFIC PROCEDURE      achilles tendon     C NONSPECIFIC PROCEDURE      knees, arthroscopy     C NONSPECIFIC PROCEDURE      basal cell skin ca, nose     C NONSPECIFIC PROCEDURE      flex sig; colonoscopy due 3/2008     C NONSPECIFIC PROCEDURE      cardioversion for atr fib     C TOTAL KNEE ARTHROPLASTY Left 2018    Dr. Malick Suarez     EP ABLATION FOCAL AFIB N/A 2019    Procedure: EP Ablation Focal AFIB;  Surgeon: Fady Prieto MD;  Location:  HEART CARDIAC CATH LAB     HERNIORRHAPHY UMBILICAL  2012    Procedure: HERNIORRHAPHY UMBILICAL;  UMBILICAL HERNIA REPAIR;  Surgeon: Ra Crocker MD;  Location:  [facility]     ORTHOPEDIC SURGERY         FAMILY HISTORY:  Family History   Problem Relation Age of Onset     Family History Negative Mother      Heart Disease Father         decesased at 42 - MI     Heart Disease Sister          MI     Heart Disease Brother          MI     Heart Disease Brother         CABG AT 49     Lipids Sister      Lipids Sister      Lipids Sister      Family History Negative Brother        SOCIAL HISTORY:  Social History     Socioeconomic History     Marital status: Single     Spouse name: None     Number of children: None     Years of education: None     Highest education level: None   Occupational History     None   Social Needs     Financial resource strain: None      Food insecurity     Worry: None     Inability: None     Transportation needs     Medical: None     Non-medical: None   Tobacco Use     Smoking status: Former Smoker     Packs/day: 1.00     Years: 10.00     Pack years: 10.00     Types: Cigarettes     Start date:      Last attempt to quit: 2011     Years since quittin.0     Smokeless tobacco: Never Used   Substance and Sexual Activity     Alcohol use: No     Frequency: Never     Drug use: No     Sexual activity: Yes     Partners: Female   Lifestyle     Physical activity     Days per week: None     Minutes per session: None     Stress: None   Relationships     Social connections     Talks on phone: None     Gets together: None     Attends Evangelical service: None     Active member of club or organization: None     Attends meetings of clubs or organizations: None     Relationship status: None     Intimate partner violence     Fear of current or ex partner: None     Emotionally abused: None     Physically abused: None     Forced sexual activity: None   Other Topics Concern     Parent/sibling w/ CABG, MI or angioplasty before 65F 55M? Not Asked   Social History Narrative     None       Thank you for allowing me to participate in the care of your patient.    Sincerely,     Natalie Campos PA-C     [facility]

## 2020-08-18 NOTE — PROGRESS NOTES
He had redo ablation. If no bleeding I would continue with Xarelto for another year. Thanks, qp    Message text

## 2020-08-19 DIAGNOSIS — M10.9 GOUT OF FOOT, UNSPECIFIED CAUSE, UNSPECIFIED CHRONICITY, UNSPECIFIED LATERALITY: ICD-10-CM

## 2020-08-19 RX ORDER — ALLOPURINOL 300 MG/1
300 TABLET ORAL DAILY
Qty: 90 TABLET | Refills: 3 | Status: SHIPPED | OUTPATIENT
Start: 2020-08-19 | End: 2021-08-20

## 2020-08-19 NOTE — TELEPHONE ENCOUNTER
Allopurinol    Routing refill request to provider for review/approval because:  Labs not current:  Uric Acid, CBC

## 2020-09-03 DIAGNOSIS — I48.19 PERSISTENT ATRIAL FIBRILLATION (H): ICD-10-CM

## 2020-09-04 RX ORDER — RIVAROXABAN 20 MG/1
TABLET, FILM COATED ORAL
Qty: 90 TABLET | Refills: 0 | Status: SHIPPED | OUTPATIENT
Start: 2020-09-04 | End: 2020-12-16

## 2020-09-04 NOTE — TELEPHONE ENCOUNTER
Routing refill request to provider for review/approval because:  Labs not current:  Platelets, Creatinine Clearance

## 2020-09-10 ENCOUNTER — VIRTUAL VISIT (OUTPATIENT)
Dept: INTERNAL MEDICINE | Facility: CLINIC | Age: 70
End: 2020-09-10
Payer: COMMERCIAL

## 2020-09-10 VITALS — WEIGHT: 265 LBS | BODY MASS INDEX: 41.5 KG/M2

## 2020-09-10 DIAGNOSIS — Z53.9 ERRONEOUS ENCOUNTER--DISREGARD: Primary | ICD-10-CM

## 2020-09-10 NOTE — PROGRESS NOTES
"Mike Schultz is a 69 year old male who is being evaluated via a billable video visit.      The patient has been notified of following:     \"This video visit will be conducted via a call between you and your physician/provider. We have found that certain health care needs can be provided without the need for an in-person physical exam.  This service lets us provide the care you need with a video conversation.  If a prescription is necessary we can send it directly to your pharmacy.  If lab work is needed we can place an order for that and you can then stop by our lab to have the test done at a later time.    Video visits are billed at different rates depending on your insurance coverage.  Please reach out to your insurance provider with any questions.    If during the course of the call the physician/provider feels a video visit is not appropriate, you will not be charged for this service.\"    Patient has given verbal consent for Video visit? Yes  How would you like to obtain your AVS? MyChart  If you are dropped from the video visit, the video invite should be resent to: Send to e-mail at: [email or web address]  Will anyone else be joining your video visit? No          Unable to reach patient after about a half dozen tries on both home (apparently disconnected) and work phone.   Patient never responded to video or phone requests for appointment.  "

## 2020-09-13 DIAGNOSIS — R60.0 PERIPHERAL EDEMA: ICD-10-CM

## 2020-09-15 RX ORDER — FUROSEMIDE 20 MG
TABLET ORAL
Qty: 180 TABLET | Refills: 3 | Status: SHIPPED | OUTPATIENT
Start: 2020-09-15 | End: 2020-09-22

## 2020-09-18 ENCOUNTER — TELEPHONE (OUTPATIENT)
Dept: INTERNAL MEDICINE | Facility: CLINIC | Age: 70
End: 2020-09-18

## 2020-09-18 DIAGNOSIS — M79.10 MYALGIA: Primary | ICD-10-CM

## 2020-09-18 NOTE — TELEPHONE ENCOUNTER
Recommend labs (since last ones were over a month ago) including checking for Lyme disease. Labs ordered - can you help patient schedule these please? Thank you.

## 2020-09-18 NOTE — TELEPHONE ENCOUNTER
Dr. Kay- see messages below. Can you work patient in for virtual visit since he missed previous one? He had tried to call but it wasn't working. Looks like multiple things were going to be addressed at that appt, and now he also needs follow up and issues below as noted.

## 2020-09-18 NOTE — TELEPHONE ENCOUNTER
Patient informed but he is not on Crestor. Please advise. Says Dr. Campos was going to start him on something for cholesterol but he delayed it so he could work on his diet and then numbers got better so never took it. He will not take any more indomethacin. Any other suggestions?

## 2020-09-18 NOTE — TELEPHONE ENCOUNTER
"Patient calling. Has had issues with shoulders and legs being sore for about a month. Notes he does not think it is COVID. Temperature was fine when he got it checked today but feels a little feverish. No SOB, cough, rash. No COVID exposure that he knows of. He does 20 minute workouts twice a day. Shoulders and legs get very sore. Has been trying Indomethacin. Was taking that for gout in past and then was told not to take it with something else?. Now takes allopurinol regularly. Stopped indomethacin previously because he was told it was not good to take long term. He has now been using it again for this shoulder and leg pain because he still had some left. Says he took two of them and all the pain goes away within a couple hours but then comes back. Says it works for 3 days. He was supposed to have a virtual visit with Dr. Kay last week to address this and other issues but he missed the calls and tried calling back but could not get it to work. When he has been taking the indomethacin, says he takes 2 of them at once in the morning with his other medications.  Is unsure dose, says whatever was last prescribed- maybe 50 mg each? He is also not sure if they are  but says \"they work\". Still taking allopurinol as well. Does not really think its gout pain.    Wondering if Dr. Kay can work him in ASAP to address this as well as other issues that he was supposed to address at missed virtual appt last week? It looks like a template was started for missed visit with multiple other issues to address. Routing to Dr. Kay but also since he is out of office can partner address if he is ok to wait for appt with Dr. Kay as well as if he can use indomethacin at his current dosing (believe 100 mg in AM when needed) in meantime to treat pain? Or if something else recommended? IF indomethacin ok, should new script be sent since he is probably taking  medication? Any COVID concerns?  "

## 2020-09-18 NOTE — TELEPHONE ENCOUNTER
I was unable to reach patient after about a half dozen tries last week.  Including phone, he did not answer call.   Please offer 1:30 time next Tuesday afternoon.

## 2020-09-18 NOTE — TELEPHONE ENCOUNTER
Reason for call:  Patient reporting a symptom  Symptom or request: shoulders and legs sore  Duration (how long have symptoms been present): 1 month  Have you been treated for this before? no  Additional comments: please call patient  Phone Number patient can be reached at:  Home number on file [phone] (home) [phone]  Best Time:  any  Can we leave a detailed message on this number:  YES  Call taken on 9/18/2020 at 12:41 PM by RAJENDRA HERNANDEZ

## 2020-09-18 NOTE — TELEPHONE ENCOUNTER
NSAIDs not recommended in the setting of AC (Xarelto).    Recommend TRIAL of holding Crestor x 1 week to see if that helps.    Statins and calcium channel blockers do not always play well together.

## 2020-09-21 DIAGNOSIS — M79.10 MYALGIA: ICD-10-CM

## 2020-09-21 DIAGNOSIS — M10.9 GOUT OF FOOT, UNSPECIFIED CAUSE, UNSPECIFIED CHRONICITY, UNSPECIFIED LATERALITY: ICD-10-CM

## 2020-09-21 LAB
ALBUMIN SERPL-MCNC: 3.2 G/DL (ref 3.4–5)
ALP SERPL-CCNC: 92 U/L (ref 40–150)
ALT SERPL W P-5'-P-CCNC: 28 U/L (ref 0–70)
ANION GAP SERPL CALCULATED.3IONS-SCNC: 6 MMOL/L (ref 3–14)
AST SERPL W P-5'-P-CCNC: 20 U/L (ref 0–45)
BILIRUB SERPL-MCNC: 0.3 MG/DL (ref 0.2–1.3)
BUN SERPL-MCNC: 18 MG/DL (ref 7–30)
CALCIUM SERPL-MCNC: 8.6 MG/DL (ref 8.5–10.1)
CHLORIDE SERPL-SCNC: 107 MMOL/L (ref 94–109)
CO2 SERPL-SCNC: 27 MMOL/L (ref 20–32)
CREAT SERPL-MCNC: 0.97 MG/DL (ref 0.66–1.25)
ERYTHROCYTE [DISTWIDTH] IN BLOOD BY AUTOMATED COUNT: 13.3 % (ref 10–15)
GFR SERPL CREATININE-BSD FRML MDRD: 79 ML/MIN/{1.73_M2}
GLUCOSE SERPL-MCNC: 96 MG/DL (ref 70–99)
HCT VFR BLD AUTO: 44.7 % (ref 40–53)
HGB BLD-MCNC: 14.4 G/DL (ref 13.3–17.7)
MAGNESIUM SERPL-MCNC: 1.9 MG/DL (ref 1.6–2.3)
MCH RBC QN AUTO: 32.2 PG (ref 26.5–33)
MCHC RBC AUTO-ENTMCNC: 32.2 G/DL (ref 31.5–36.5)
MCV RBC AUTO: 100 FL (ref 78–100)
PHOSPHATE SERPL-MCNC: 3.1 MG/DL (ref 2.5–4.5)
PLATELET # BLD AUTO: 280 10E9/L (ref 150–450)
POTASSIUM SERPL-SCNC: 4 MMOL/L (ref 3.4–5.3)
PROT SERPL-MCNC: 7.2 G/DL (ref 6.8–8.8)
RBC # BLD AUTO: 4.47 10E12/L (ref 4.4–5.9)
SODIUM SERPL-SCNC: 140 MMOL/L (ref 133–144)
TSH SERPL DL<=0.005 MIU/L-ACNC: 0.82 MU/L (ref 0.4–4)
URATE SERPL-MCNC: 5.5 MG/DL (ref 3.5–7.2)
WBC # BLD AUTO: 9.8 10E9/L (ref 4–11)

## 2020-09-21 PROCEDURE — 80053 COMPREHEN METABOLIC PANEL: CPT | Performed by: INTERNAL MEDICINE

## 2020-09-21 PROCEDURE — 83735 ASSAY OF MAGNESIUM: CPT | Performed by: INTERNAL MEDICINE

## 2020-09-21 PROCEDURE — 84550 ASSAY OF BLOOD/URIC ACID: CPT | Performed by: INTERNAL MEDICINE

## 2020-09-21 PROCEDURE — 84100 ASSAY OF PHOSPHORUS: CPT | Performed by: INTERNAL MEDICINE

## 2020-09-21 PROCEDURE — 85027 COMPLETE CBC AUTOMATED: CPT | Performed by: INTERNAL MEDICINE

## 2020-09-21 PROCEDURE — 84443 ASSAY THYROID STIM HORMONE: CPT | Performed by: INTERNAL MEDICINE

## 2020-09-21 PROCEDURE — 36415 COLL VENOUS BLD VENIPUNCTURE: CPT | Performed by: INTERNAL MEDICINE

## 2020-09-21 PROCEDURE — 86618 LYME DISEASE ANTIBODY: CPT | Performed by: INTERNAL MEDICINE

## 2020-09-22 ENCOUNTER — VIRTUAL VISIT (OUTPATIENT)
Dept: INTERNAL MEDICINE | Facility: CLINIC | Age: 70
End: 2020-09-22
Payer: COMMERCIAL

## 2020-09-22 DIAGNOSIS — I10 ESSENTIAL HYPERTENSION: ICD-10-CM

## 2020-09-22 DIAGNOSIS — M25.511 BILATERAL SHOULDER PAIN, UNSPECIFIED CHRONICITY: ICD-10-CM

## 2020-09-22 DIAGNOSIS — M25.512 BILATERAL SHOULDER PAIN, UNSPECIFIED CHRONICITY: ICD-10-CM

## 2020-09-22 DIAGNOSIS — Z00.00 ENCOUNTER FOR PREVENTIVE HEALTH EXAMINATION: Primary | ICD-10-CM

## 2020-09-22 DIAGNOSIS — E78.5 HYPERLIPIDEMIA LDL GOAL <130: ICD-10-CM

## 2020-09-22 DIAGNOSIS — R60.0 PERIPHERAL EDEMA: ICD-10-CM

## 2020-09-22 DIAGNOSIS — E66.01 MORBID OBESITY DUE TO EXCESS CALORIES (H): ICD-10-CM

## 2020-09-22 DIAGNOSIS — I77.810 ASCENDING AORTA DILATATION (H): ICD-10-CM

## 2020-09-22 LAB — B BURGDOR IGG+IGM SER QL: 0.11 (ref 0–0.89)

## 2020-09-22 PROCEDURE — 99397 PER PM REEVAL EST PAT 65+ YR: CPT | Mod: 95 | Performed by: INTERNAL MEDICINE

## 2020-09-22 PROCEDURE — 99213 OFFICE O/P EST LOW 20 MIN: CPT | Mod: 95 | Performed by: INTERNAL MEDICINE

## 2020-09-22 RX ORDER — FUROSEMIDE 20 MG
20 TABLET ORAL DAILY
COMMUNITY
Start: 2020-09-22 | End: 2021-07-01

## 2020-09-22 NOTE — PROGRESS NOTES
"Mike Schultz is a 70 year old male who is being evaluated via a billable video visit.      The patient has been notified of following:     \"This video visit will be conducted via a call between you and your physician/provider. We have found that certain health care needs can be provided without the need for an in-person physical exam.  This service lets us provide the care you need with a video conversation.  If a prescription is necessary we can send it directly to your pharmacy.  If lab work is needed we can place an order for that and you can then stop by our lab to have the test done at a later time.    Video visits are billed at different rates depending on your insurance coverage.  Please reach out to your insurance provider with any questions.    If during the course of the call the physician/provider feels a video visit is not appropriate, you will not be charged for this service.\"    Patient has given verbal consent for Video visit? Yes  How would you like to obtain your AVS? MyChart  If you are dropped from the video visit, the video invite should be resent to: Please go through [employer] call pt cell at [phone]  Will anyone else be joining your video visit? No      Subjective     Mike Schultz is a 70 year old male who presents today via video visit for the following health issues:    HPI  Hypertension Follow-up       Do you check your blood pressure regularly outside of the clinic? not often, did this during visit:  171/100, pulse 70.  First time with machine this year.   Repeat was     Are you following a low salt diet? No    Are your blood pressures ever more than 140 on the top number (systolic) OR more       than 90 on the bottom number (diastolic), for example 140/90? No       How many servings of fruits and vegetables do you eat daily?  2-3    On average, how many sweetened beverages do you drink each day (Examples: soda, juice, sweet tea, etc.  Do NOT count diet or artificially " "sweetened beverages)?   0    How many days per week do you exercise enough to make your heart beat faster? 7    How many minutes a day do you exercise enough to make your heart beat faster? 60 or more    How many days per week do you miss taking your medication? 0     Preventative Visit     Are you in the first 12 months of your Medicare Part B coverage?  No     Physical Health:    In general, how would you rate your overall physical health? excellent    Outside of work, how many days during the week do you exercise? 6-7 days/week    Outside of work, approximately how many minutes a day do you exercise?greater than 60 minutes    If you drink alcohol do you typically have >3 drinks per day or >7 drinks per week? No    Do you usually eat at least 4 servings of fruit and vegetables a day, include whole grains & fiber and avoid regularly eating high fat or \"junk\" foods? Yes    Do you have any problems taking medications regularly? No    Do you have any side effects from medications? none    Needs assistance for the following daily activities: no assistance needed    Which of the following safety concerns are present in your home?  none identified     Hearing impairment: No    In the past 6 months, have you been bothered by leaking of urine? no     Mental Health:    In general, how would you rate your overall mental or emotional health? excellent    PHQ-2 Score:       Do you feel safe in your environment? Yes     Have you ever done Advance Care Planning? (For example, a Health Directive, POLST, or a discussion with a medical provider or your loved ones about your wishes)? Yes, advance care planning is on file.     Fall risk:  Fallen 2 or more times in the past year?: No  Any fall with injury in the past year?: No     Cognitive Screening:   Immediate recall 3/3  25 minute recall 2/3  CLOCK draw normal        Additional issues to address:  1.  Not drinking since 2019!  2.  Follow-up ascending aortic dilatation, last echo " 3/2019.  No chest pain or other symptoms.     3. Shoulder Pains bilaterally.   Started a month ago pain in both shoulders/hands/legs- took some indomethacin used before for gout.   Worked for 2 days and worked but symptoms back after 48 hours.   Has hard time even getting down on the floor due to pain.   This has happened on mulitple times.   Will also feel a bit feverish.   Feels weaker than normal.  4.  Was prescribed crestor, but never started medication    Video Start Time: 6:10 PM      Review of Systems   Constitutional, HEENT, cardiovascular, pulmonary, gi and gu systems are negative, except as otherwise noted.   No ankle edema.  Using CPAP nightly.          Objective           Vitals:  No vitals were obtained today due to virtual visit.    Physical Exam     GENERAL: Healthy, alert and no distress  EYES: Eyes grossly normal to inspection.  No discharge or erythema, or obvious scleral/conjunctival abnormalities.  RESP: No audible wheeze, cough, or visible cyanosis.  No visible retractions or increased work of breathing.    SKIN: Visible skin clear. No significant rash, abnormal pigmentation or lesions.  NEURO: Cranial nerves grossly intact.  Mentation and speech appropriate for age.  PSYCH: Mentation appears normal, affect normal/bright, judgement and insight intact, normal speech and appearance well-groomed.      ASSESSMENT:   1.  Preventative visit   2.  HTN, uncontrolled at home today.  3.  Follow-up ascending aortic dilatation  4.  Follow-up Morbid Obesity.   Last BMI done January > 41.   reports weight down to 263.  5.  Hyperlipidemia, stable without medication  6.  Bilateral shoulder myalgias, other pains.        PLAN:  1.  Check CPK and ESR  2.  Get blood pressure checked at [facility] or [facility].    3.  Use indomethacin for gout.   Trial of aleve, 2 twice daily with food for a few days.  Risks and benefits discussed, including possible GI side effects.    4.  Plan recheck  echo next winter    Video-Visit Details    Type of service:  Video Visit    Video End Time:6:39 PM    Originating Location (pt. Location): Home    Distant Location (provider location):  [facility]     Platform used for Video Visit: Connie

## 2020-09-22 NOTE — PATIENT INSTRUCTIONS
PLAN:  1.  Check CPK and ESR  2.  Get blood pressure checked at [facility] or [facility].    3.  Use indomethacin for gout.   Trial of aleve, 2 twice daily with food for a few days.  Risks and benefits discussed, including possible GI side effects.    4.  Plan recheck echo next winter

## 2020-09-25 DIAGNOSIS — R60.0 PERIPHERAL EDEMA: ICD-10-CM

## 2020-09-25 DIAGNOSIS — I48.19 PERSISTENT ATRIAL FIBRILLATION (H): ICD-10-CM

## 2020-09-25 RX ORDER — AMLODIPINE BESYLATE 2.5 MG/1
2.5 TABLET ORAL DAILY
Qty: 90 TABLET | Refills: 3 | Status: SHIPPED | OUTPATIENT
Start: 2020-09-25 | End: 2021-07-01

## 2020-09-28 RX ORDER — POTASSIUM CHLORIDE 750 MG/1
20 TABLET, EXTENDED RELEASE ORAL DAILY
Qty: 30 TABLET | Refills: 1 | Status: SHIPPED | OUTPATIENT
Start: 2020-09-28 | End: 2020-10-27

## 2020-10-05 ENCOUNTER — MYC MEDICAL ADVICE (OUTPATIENT)
Dept: INTERNAL MEDICINE | Facility: CLINIC | Age: 70
End: 2020-10-05

## 2020-10-06 ENCOUNTER — OFFICE VISIT (OUTPATIENT)
Dept: INTERNAL MEDICINE | Facility: CLINIC | Age: 70
End: 2020-10-06
Payer: COMMERCIAL

## 2020-10-06 ENCOUNTER — ANCILLARY PROCEDURE (OUTPATIENT)
Dept: GENERAL RADIOLOGY | Facility: CLINIC | Age: 70
End: 2020-10-06
Attending: PHYSICIAN ASSISTANT
Payer: COMMERCIAL

## 2020-10-06 VITALS
WEIGHT: 274.5 LBS | TEMPERATURE: 96.5 F | OXYGEN SATURATION: 96 % | RESPIRATION RATE: 16 BRPM | HEART RATE: 64 BPM | BODY MASS INDEX: 42.99 KG/M2 | DIASTOLIC BLOOD PRESSURE: 80 MMHG | SYSTOLIC BLOOD PRESSURE: 138 MMHG

## 2020-10-06 DIAGNOSIS — M25.511 BILATERAL SHOULDER PAIN, UNSPECIFIED CHRONICITY: ICD-10-CM

## 2020-10-06 DIAGNOSIS — M25.50 MULTIPLE JOINT PAIN: Primary | ICD-10-CM

## 2020-10-06 DIAGNOSIS — M79.642 PAIN IN BOTH HANDS: ICD-10-CM

## 2020-10-06 DIAGNOSIS — M79.641 PAIN IN BOTH HANDS: ICD-10-CM

## 2020-10-06 DIAGNOSIS — M25.512 BILATERAL SHOULDER PAIN, UNSPECIFIED CHRONICITY: ICD-10-CM

## 2020-10-06 LAB
CK SERPL-CCNC: 80 U/L (ref 30–300)
CRP SERPL-MCNC: 27 MG/L (ref 0–8)
ERYTHROCYTE [SEDIMENTATION RATE] IN BLOOD BY WESTERGREN METHOD: 18 MM/H (ref 0–20)

## 2020-10-06 PROCEDURE — 99214 OFFICE O/P EST MOD 30 MIN: CPT | Performed by: PHYSICIAN ASSISTANT

## 2020-10-06 PROCEDURE — 82550 ASSAY OF CK (CPK): CPT | Performed by: INTERNAL MEDICINE

## 2020-10-06 PROCEDURE — 85652 RBC SED RATE AUTOMATED: CPT | Performed by: INTERNAL MEDICINE

## 2020-10-06 PROCEDURE — 73130 X-RAY EXAM OF HAND: CPT | Mod: LT | Performed by: INTERNAL MEDICINE

## 2020-10-06 PROCEDURE — 86140 C-REACTIVE PROTEIN: CPT | Performed by: PHYSICIAN ASSISTANT

## 2020-10-06 NOTE — PROGRESS NOTES
Subjective     Mike Schultz is a 70 year old male who presents to clinic today for the following health issues:    HPI         Concern - Swollen extremities   Onset: x3 weeks  Description: hands and shoulders bilateral   Intensity: 5/10  Progression of Symptoms:  intermittent  Accompanying Signs & Symptoms: swelling and in the am cannot close hands  Previous history of similar problem: none  Precipitating factors:        Worsened by: in the morning cannot move hands but, loosens towards the afternoon  Alleviating factors:        Improved by: indomethacin has some left over from gout and that helped   Therapies tried and outcome: indomethacin aleve with some relief    Patient recently reviewed this with his PCP who has ordered an ESR and CK.   Patient reports his shoulders have been very sore   - hands are stiff as well   - improve throughout the day   - numbness 3-5 fingers of left hand       Objective    /80 (BP Location: Left arm, Patient Position: Chair, Cuff Size: Adult Large)   Pulse 64   Temp 96.5  F (35.8  C) (Temporal)   Resp 16   Wt 124.5 kg (274 lb 8 oz)   SpO2 96%   BMI 42.99 kg/m    Body mass index is 42.99 kg/m .  Physical Exam   GENERAL: healthy, alert and no distress  MS: hands are puffy b/l, normal strength, no tenderness   Shoulder without tenderness to palpitation but tenderness flexion, abduction and reaching behind him, full strength     No results found for this or any previous visit (from the past 24 hour(s)).        Below work up was completed with no significant cause of symptoms found. Reviewed options to try PT vs seeing our sports medicine team. Pt would like to see sports medicine first and referral has been placed.     Assessment & Plan     Multiple joint pain  - CRP, inflammation    Pain in both hands  - XR Hand Bilateral G/E 3 Views; Future  - Rheumatoid factor; Future  - Cyclic Citrullinated Peptide Antibody IgG; Future    Bilateral shoulder pain, unspecified  chronicity  - ESR: Erythrocyte sedimentation rate  - CK total       No follow-ups on file.    HAROLDO Marina [facility]

## 2020-10-07 ENCOUNTER — TELEPHONE (OUTPATIENT)
Dept: INTERNAL MEDICINE | Facility: CLINIC | Age: 70
End: 2020-10-07

## 2020-10-07 DIAGNOSIS — M25.512 ACUTE PAIN OF BOTH SHOULDERS: ICD-10-CM

## 2020-10-07 DIAGNOSIS — M79.641 PAIN IN BOTH HANDS: ICD-10-CM

## 2020-10-07 DIAGNOSIS — M25.511 ACUTE PAIN OF BOTH SHOULDERS: ICD-10-CM

## 2020-10-07 DIAGNOSIS — M79.642 PAIN IN BOTH HANDS: ICD-10-CM

## 2020-10-07 DIAGNOSIS — M79.642 PAIN IN BOTH HANDS: Primary | ICD-10-CM

## 2020-10-07 DIAGNOSIS — M79.641 PAIN IN BOTH HANDS: Primary | ICD-10-CM

## 2020-10-07 LAB — RHEUMATOID FACT SER NEPH-ACNC: <7 IU/ML (ref 0–20)

## 2020-10-07 PROCEDURE — 86431 RHEUMATOID FACTOR QUANT: CPT | Performed by: PHYSICIAN ASSISTANT

## 2020-10-07 PROCEDURE — 86200 CCP ANTIBODY: CPT | Performed by: PHYSICIAN ASSISTANT

## 2020-10-07 NOTE — TELEPHONE ENCOUNTER
Patient called to check on lab results. Advised labs are still in process.     Will await to hear back from provider.     Asia ERAZON, RN, PHN

## 2020-10-08 LAB — CCP AB SER IA-ACNC: 1 U/ML

## 2020-10-08 NOTE — TELEPHONE ENCOUNTER
Pt calling back, informed him of normal Rheumatoid factor.    Still waiting for: Cyclic Citrullinated Peptide Antibody IgG.

## 2020-10-09 NOTE — TELEPHONE ENCOUNTER
Labs are within normal limits. I recommend either seeing our sports medicine doctor for further evaluation or starting with hand therapy, which would patient prefer?

## 2020-10-09 NOTE — TELEPHONE ENCOUNTER
Call back from patient.     Provider message was shared.     Would like referral to sports medicine. Does not think hand therapy would be a good first step because it only happens when he is sleeping at night.     Patient would like a call back when this has been complete.

## 2020-10-15 ENCOUNTER — OFFICE VISIT (OUTPATIENT)
Dept: ORTHOPEDICS | Facility: CLINIC | Age: 70
End: 2020-10-15
Payer: COMMERCIAL

## 2020-10-15 VITALS
BODY MASS INDEX: 43 KG/M2 | DIASTOLIC BLOOD PRESSURE: 96 MMHG | WEIGHT: 274 LBS | HEIGHT: 67 IN | SYSTOLIC BLOOD PRESSURE: 154 MMHG

## 2020-10-15 DIAGNOSIS — M25.642 STIFFNESS OF JOINTS OF BOTH HANDS: Primary | ICD-10-CM

## 2020-10-15 DIAGNOSIS — M25.641 STIFFNESS OF JOINTS OF BOTH HANDS: Primary | ICD-10-CM

## 2020-10-15 DIAGNOSIS — G56.03 BILATERAL CARPAL TUNNEL SYNDROME: ICD-10-CM

## 2020-10-15 PROCEDURE — 99244 OFF/OP CNSLTJ NEW/EST MOD 40: CPT | Performed by: FAMILY MEDICINE

## 2020-10-15 RX ORDER — PREDNISONE 20 MG/1
20 TABLET ORAL DAILY
Qty: 10 TABLET | Refills: 0 | Status: SHIPPED | OUTPATIENT
Start: 2020-10-15 | End: 2020-11-19

## 2020-10-15 ASSESSMENT — MIFFLIN-ST. JEOR: SCORE: 1966.49

## 2020-10-15 NOTE — PROGRESS NOTES
"ASSESSMENT & PLAN    1.  2. Stiffness of joints of both hands   Bilateral carpal tunnel syndrome     Seen in consultation for bilateral hand pain, swelling and stiffness  Reviewed xrays - no osteoarthritis  Symptoms seem related to carpal tunnel  Referral to hand therapy  Given wrist brace  Rx for steroid. Take in the AM and with food. No other anti-inflammatories (Ibuprofen, Advil, Aleve, Motrin) while you're taking this.  If you can't tolerate 40mg tablet then take 20mg (1 tablet) but only for 5 day    -----    SUBJECTIVE  Mike Schultz is a/an 70 year old Left handed male who is seen in consultation at the request of  Johnny Kay M.D. for evaluation of bilateral hand pain. The patient is seen by themselves.    Onset: 1 month(s) ago. Reports insidious onset without acute precipitating event.  Location of Pain: bilateral hands and wrists  Rating of Pain at worst: 10/10  Rating of Pain Currently: 10/10  Worsened by: pain worse in the morning (improves throughout the morning), gripping, grasping  Better with: allopurinol  Treatments tried: rest/activity avoidance, ice, heat, Aleve, other medications: allopurinol and previous imaging (xray 10/6/20), lab tests  Associated symptoms: swelling, weakness of hands and stiffness  Orthopedic history: YES - patient has h/o gout in foot  Relevant surgical history: NO  Patient Social History: works at office job    Patient's past medical, surgical, social, and family histories were reviewed today and no pertinent history related to patient's presenting problem.    REVIEW OF SYSTEMS:  10 point ROS is negative other than symptoms noted above in HPI, Past Medical History or as stated below  Constitutional: NEGATIVE for fever, chills, change in weight  Skin: NEGATIVE for worrisome rashes, moles or lesions  GI/: NEGATIVE for bowel or bladder changes  Neuro: NEGATIVE for weakness, dizziness or paresthesias    OBJECTIVE:  BP (!) 154/96   Ht 1.702 m (5' 7\")   Wt 124.3 kg " "(274 lb)   BMI 42.91 kg/m     General: healthy, alert and in no distress  HEENT: no scleral icterus or conjunctival erythema  Skin: no suspicious lesions or rash. No jaundice.  CV: regular rhythm by palpation  Resp: normal respiratory effort without conversational dyspnea   Psych: normal mood and affect  Gait: normal steady gait with appropriate coordination and balance  Neuro: mildly decreased sensation tips of 2 - 3 otherwise normal light touch sensory exam of the bilateral hands.    MSK:  BILATERAL HAND  Inspection:    + swelling L > R  Palpation:   Nontender along dorsal wrist but reports \"feels tight\"   Range of Motion:    Tightness at end range of wrist flexion and extension.  Difficulty initiating a  due to swelling and tightness in his fingers, left greater than right   Strength:     limited by tightness limited by pain  Special Tests:    Negative: Tinel's, Phalen's    Independent visualization of the below image:    Results for orders placed or performed in visit on 10/06/20   XR Hand Bilateral G/E 3 Views    Narrative    HAND BILATERAL THREE OR MORE VIEWS October 6, 2020 11:19 AM     HISTORY: Bilateral hand, left worse right for four weeks, swelling  noted, pain at metacarpophalangeal joints. Pain in both hands.    COMPARISON: None.      Impression    IMPRESSION:     Right hand: No acute bony abnormality or significant osteoarthritis. A  tiny ossicle projects in the soft tissues at the radial aspect of the  interphalangeal joint of the thumb, likely of no current clinical  significance. No periarticular erosions, periarticular osteopenia,  subluxations or capsular swelling to indicate synovial-based  arthritis.    Left hand: No acute bony abnormality or significant osteoarthritis. No  periarticular erosions, periarticular osteopenia, subluxations or  capsular swelling to indicate synovial-based arthritis.    MD Marialuisa RAINEY, DO [facility]

## 2020-10-15 NOTE — LETTER
10/15/2020         RE: Mike Schultz  [address] [facility] [address]        Dear Colleague,    Thank you for referring your patient, Mike Schultz, to the [facility] [place]. Please see a copy of my visit note below.    ASSESSMENT & PLAN    1.  2. Stiffness of joints of both hands   Bilateral carpal tunnel syndrome     Seen in consultation for bilateral hand pain, swelling and stiffness  Reviewed xrays - no osteoarthritis  Symptoms seem related to carpal tunnel  Referral to hand therapy  Given wrist brace  Rx for steroid. Take in the AM and with food. No other anti-inflammatories (Ibuprofen, Advil, Aleve, Motrin) while you're taking this.  If you can't tolerate 40mg tablet then take 20mg (1 tablet) but only for 5 day    -----    SUBJECTIVE  Mike Schultz is a/an 70 year old Left handed male who is seen in consultation at the request of  Johnny Kay M.D. for evaluation of bilateral hand pain. The patient is seen by themselves.    Onset: 1 month(s) ago. Reports insidious onset without acute precipitating event.  Location of Pain: bilateral hands and wrists  Rating of Pain at worst: 10/10  Rating of Pain Currently: 10/10  Worsened by: pain worse in the morning (improves throughout the morning), gripping, grasping  Better with: allopurinol  Treatments tried: rest/activity avoidance, ice, heat, Aleve, other medications: allopurinol and previous imaging (xray 10/6/20), lab tests  Associated symptoms: swelling, weakness of hands and stiffness  Orthopedic history: YES - patient has h/o gout in foot  Relevant surgical history: NO  Patient Social History: works at office job    Patient's past medical, surgical, social, and family histories were reviewed today and no pertinent history related to patient's presenting problem.    REVIEW OF SYSTEMS:  10 point ROS is negative other than symptoms noted above in HPI, Past Medical History or as stated  "below  Constitutional: NEGATIVE for fever, chills, change in weight  Skin: NEGATIVE for worrisome rashes, moles or lesions  GI/: NEGATIVE for bowel or bladder changes  Neuro: NEGATIVE for weakness, dizziness or paresthesias    OBJECTIVE:  BP (!) 154/96   Ht 1.702 m (5' 7\")   Wt 124.3 kg (274 lb)   BMI 42.91 kg/m     General: healthy, alert and in no distress  HEENT: no scleral icterus or conjunctival erythema  Skin: no suspicious lesions or rash. No jaundice.  CV: regular rhythm by palpation  Resp: normal respiratory effort without conversational dyspnea   Psych: normal mood and affect  Gait: normal steady gait with appropriate coordination and balance  Neuro: mildly decreased sensation tips of 2 - 3 otherwise normal light touch sensory exam of the bilateral hands.    MSK:  BILATERAL HAND  Inspection:    + swelling L > R  Palpation:   Nontender along dorsal wrist but reports \"feels tight\"   Range of Motion:    Tightness at end range of wrist flexion and extension.  Difficulty initiating a  due to swelling and tightness in his fingers, left greater than right   Strength:     limited by tightness limited by pain  Special Tests:    Negative: Tinel's, Phalen's    Independent visualization of the below image:    Results for orders placed or performed in visit on 10/06/20   XR Hand Bilateral G/E 3 Views    Narrative    HAND BILATERAL THREE OR MORE VIEWS October 6, 2020 11:19 AM     HISTORY: Bilateral hand, left worse right for four weeks, swelling  noted, pain at metacarpophalangeal joints. Pain in both hands.    COMPARISON: None.      Impression    IMPRESSION:     Right hand: No acute bony abnormality or significant osteoarthritis. A  tiny ossicle projects in the soft tissues at the radial aspect of the  interphalangeal joint of the thumb, likely of no current clinical  significance. No periarticular erosions, periarticular osteopenia,  subluxations or capsular swelling to indicate " synovial-based  arthritis.    Left hand: No acute bony abnormality or significant osteoarthritis. No  periarticular erosions, periarticular osteopenia, subluxations or  capsular swelling to indicate synovial-based arthritis.    MD Marialuisa RAINEY,  [facility]          Again, thank you for allowing me to participate in the care of your patient.        Sincerely,        Marialuisa Matthews DO

## 2020-10-15 NOTE — PATIENT INSTRUCTIONS
1. Stiffness of joints of both hands    2. Bilateral carpal tunnel syndrome      Reviewed xrays - no osteoarthritis  Symptoms seem related to carpal tunnel  Referral to hand therapy  Given wrist brace  Rx for steroid. Take in the AM and with food. No other anti-inflammatories (Ibuprofen, Advil, Aleve, Motrin) while you're taking this.  If you can't tolerate 40mg tablet then take 20mg (1 tablet) but only for 5 day

## 2020-10-20 NOTE — PROGRESS NOTES
Hand Therapy Initial Evaluation    Current Date:  10/21/2020    Diagnosis: Wrist pain  DOI: 9/1/20    Precautions: na    Subjective:  Mike Schultz is a 70 year old male.    Patient reports symptoms of the bilateral wrists (L>R) which occurred due to unknown. Since onset symptoms are Gradually getting better.  Medical allergies:none.  Surgical history: heart: ablation.  Current occupation is     Answers for HPI/ROS submitted by the patient on 10/20/2020   History Reported by Patient  Reason for Visit:: sore hand fingers shoulder  When problem began:: 9/1/2020  How problem occurred:: unknown  Number scale: 5/10  General health as reported by patient: good  Please check all that apply to your current or past medical history: sleep disorder/apnea  Medications you are currently taking: anti-inflammatory, high blood pressure medication  What are your primary job tasks: computer work, driving    Occupational Profile Information:  Left hand dominant  Prior functional level:  no limitations  Patient reports symptoms of pain, stiffness/loss of motion and tingling   Special tests:  ultrasound.    Previous treatment: prednisone for 5 days (October 15-19)  Barriers include:none  Mobility: No difficulty  Transportation: drives  Currently working in normal job without restrictions  Leisure activities/hobbies: walking, exercises, TV    Functional Outcome Measure:   Upper Extremity Functional Index Score:  SCORE:   Column Totals: /80: (P) 0   (A lower score indicates greater disability.)      Objective:  Pain Level (Scale 0-10)   10/20/2020   At Rest R:1  L: 1   With Use R: 1  L: 3     Pain Description  Date 10/20/2020   Location wrist and hand   Pain Quality Tingling and Tiring   Frequency intermittent     Pain is worst  daytime or nighttime   Exacerbated by  sleep   Relieved by cold and heat   Progression Improving due to prednisone     Posture  Normal and Rounded Forward  Shoulders?    Edema  None    Sensation  WNL throughout all nerve distributions; per patient report    ROM   WNL wrist and finger motion    Cervical Screen  Pain Report:  - none    + mild    ++ moderate    +++ severe      Active with Gentle Overpressure 10/20/2020   Flexion -   Extension -   Lateral Flexion Right -   Lateral Flexion Left -   Rotation Right -   Rotation Left -   Compression -   Traction NT     Special Tests   - none    + mild    ++ moderate    +++ severe       10/20/2020   Yanira Test -   Costoclavicular Test -   Elbow Flexion Test -   Tinels Cubital Tunnel -   Tinels [place] -   Median Nerve Compression at Pronator -   Graham test for lumbrical incursion  (fist x 30 secs) NT   Tinels at Carpal Tunnel -   Phalens -     Neural Tension Testing  MNT: Median Neurodynamic Test (based on DS Alfred's ULNT)   10/20/2020   0-5 Scale 3/5   Position:   0/5: Arm across abdomen in coronal plane  1/5: Depress shoulder, ER to neutral ABD shoulder to 45 degrees  2/5: ER shoulder to end range, keep elbow at 90 degrees  3/5: Extend elbow to 0 degrees  4/5: Fully supinate forearm  5/5: Extend wrist, fingers and thumb    RNT: Radial Neurodynamic Test (based on DS Alfred's ULNT)   10/20/2020   0-5 Scale 5/5   Position:   0/5: Arm across abdomen in coronal plane  1/5: Depress shoulder, ER to neutral ABD shoulder to 45 degrees  2/5: IR shoulder to end range, keep elbow at 90 degrees  3/5: Extend elbow to 0 degrees  4/5: Fully pronate forearm  5/5: Flex wrist and fingers with UD    UNT: Ulnar Neurodynamic Test (based on DS Alfred's ULNT)   10/20/2020   0-5 Scale 3/5   Position:   0/5: Arm across abdomen in coronal plane  1/5: ER to neutral ABD shoulder to 45 degrees  2/5: ER shoulder to 90 degrees  3/5: Block shoulder and ABD shoulder to 110 degrees  4/5: Fully pronate forearm, extend wrist and ring and small fingers  5/5: Flex elbow and bring hand to side of face  Notes:    (+) indicates beyond grade level but less than  [facility] to next level    (-) indicates over [facility] to level    S1  onset/change of patient's symptoms    S2 definite stop point based on patient's discomfort level      Resisted Testing  Pain Report:  - none    + mild    ++ moderate    +++ severe    10/20/2020   Elbow Extension NT   Elbow Flexion NT   Supination  NT   Pronation -   Wrist Ext with RD, Elbow at side -   Wrist Ext with UD, Elbow at side -   Wrist Ext with RD, Elbow Ext -   Wrist Ext with UD, Elbow Ext -   Wrist Flex with RD, Elbow at side -   Wrist Flex with UD, Elbow at side -   Wrist Flex with RD, Elbow Ext -   Wrist Flex with UD, Elbow Ext -   EDC with Elbow at side -   EDC with Elbow Ext -   Long Finger Test -   FDS -     Strength   (Measured in pounds)  Pain Report: - none  + mild    ++ moderate    +++ severe    10/20/2020 10/20/2020   Trials Right Left   1  2  3     Average 73# 45#     Lat Pinch 10/20/2020 10/20/2020   Trials Right Left   1  2  3     Average 24# 17#     3 Pt Pinch 10/20/2020 10/20/2020   Trials Right Left   1  2  3     Average 20# 14#     Palpation   Pain Report:  - none    + mild    ++ moderate    +++ severe    10/20/2020   Upper arm and forearm -                         Assessment:  Patient presents with symptoms consistent with diagnosis of left wrist and hand pain/swelling/stiffness, with conservative intervention. However, due to prednisone, pt. Currently not having edema or stiffness.    Patient's limitations or Problem List includes:  Decreased ROM/motion, Decreased  and Decreased pinch of the left hand.    Rehab Potential:  Good - Return to full activity, some limitations    Patient will benefit from skilled Occupational Therapy to increase  strength, pinch strength and nerve mobility to return to previous activity level and resume normal daily tasks and to reach their rehab potential.    Barriers to Learning:  No barrier    Communication Issues:  Patient appears to be able to clearly communicate and  understand verbal and written communication and follow directions correctly.    Chart Review: Simple history review with patient    Identified Performance Deficits: sleep    Assessment of Occupational Performance:  1-3 Performance Deficits    Clinical Decision Making (Complexity): Low complexity    Treatment Explanation:  The following has been discussed with the patient:  RX ordered/plan of care  Anticipated outcomes  Possible risks and side effects    Plan:  Frequency:  Patient will return in 3 weeks if symptoms return. (Currently no symptoms due to prednisone)  Duration:  for 1-3 visits    Treatment Plan:    Therapeutic Exercise:    AROM, Tendon Gliding and Blocking  Neuromuscular re-ed:   Nerve Gliding and Posture  Manual Techniques:   Myofascial release  Orthotic Fabrication:    Static orthosis  Self Care:    sleeping    Discharge Plan:  Achieve all LTG.  Independent in home treatment program.  Reach maximal therapeutic benefit.    Home Exercise Program:  Blocking  Proximal median nerve gliding  Prefab wrist orthosis prn at HS    Next Visit:  Nerve gliding  Assess strength

## 2020-10-21 ENCOUNTER — THERAPY VISIT (OUTPATIENT)
Dept: OCCUPATIONAL THERAPY | Facility: CLINIC | Age: 70
End: 2020-10-21
Attending: FAMILY MEDICINE
Payer: COMMERCIAL

## 2020-10-21 DIAGNOSIS — G56.03 BILATERAL CARPAL TUNNEL SYNDROME: ICD-10-CM

## 2020-10-21 PROCEDURE — 97110 THERAPEUTIC EXERCISES: CPT | Mod: GO | Performed by: OCCUPATIONAL THERAPIST

## 2020-10-21 PROCEDURE — 97165 OT EVAL LOW COMPLEX 30 MIN: CPT | Mod: GO | Performed by: OCCUPATIONAL THERAPIST

## 2020-10-21 PROCEDURE — 97112 NEUROMUSCULAR REEDUCATION: CPT | Mod: GO | Performed by: OCCUPATIONAL THERAPIST

## 2020-10-26 DIAGNOSIS — R60.0 PERIPHERAL EDEMA: ICD-10-CM

## 2020-10-27 RX ORDER — POTASSIUM CHLORIDE 750 MG/1
20 TABLET, EXTENDED RELEASE ORAL DAILY
Qty: 30 TABLET | Refills: 11 | Status: SHIPPED | OUTPATIENT
Start: 2020-10-27 | End: 2021-02-22

## 2020-11-09 ENCOUNTER — TELEPHONE (OUTPATIENT)
Dept: CARDIOLOGY | Facility: CLINIC | Age: 70
End: 2020-11-09

## 2020-11-09 DIAGNOSIS — I48.19 PERSISTENT ATRIAL FIBRILLATION (H): Primary | ICD-10-CM

## 2020-11-09 NOTE — TELEPHONE ENCOUNTER
11/9/20 Pt called stating that he noted on his Apple Watch 4 days ago that he has been in Afib , Hrs in the 80's. He feels well but has noted in the mornings he has had swelling and stiffness in his hands that usually goes away after he gets his hands and fingers moving. Pt states he has been drinking a small amt of alcohol ( 3-4 Truelys a couple times per week) and wondering if this could trigger his Afib. Verified with pt that it can.   Will have pt come for EKG tomorrow at 2 pm  Wellness Screening Tool    Symptom Screening:    Do you have one of the following NEW symptoms:      Fever (subjective or >100.0)?  No    New cough? No    Shortness of breath? No    Chills? No    New loss of taste or smell? No    Generalized body aches? No    New persistent headache? No    New sore throat? No    Nausea, vomiting or diarrhea? No    Within the past 2 weeks, have you been exposed to someone with a known positive illness below?      COVID - 19 (known or suspected) No    Chicken pox?  No    Measles? No    Pertussis? No    Have you had a positive COVID-19 diagnostic test (nasal swab test) in the last 14 days or are you currently   on self-quarantine restrictions (i.e.travel restriction, exposure, etc?) No        Patient notified of visitor restriction: Yes  Patient informed to wear a mask: Yes    Patient's appointment status: Patient will be seen in clinic as scheduled on 11/10/20 at 2 pm. Pt voiced understanding and agreement w nisa Matthews 5 pm

## 2020-11-10 DIAGNOSIS — I48.19 PERSISTENT ATRIAL FIBRILLATION (H): ICD-10-CM

## 2020-11-10 PROCEDURE — 93000 ELECTROCARDIOGRAM COMPLETE: CPT | Performed by: INTERNAL MEDICINE

## 2020-11-10 NOTE — PROGRESS NOTES
Last office visit: 2020    Previous blood pressure:  154/96   Mm Hg     Previous heart rate:   64   bpm    Time of readin:08pm    Morning medications were taken at: 6:30am    Today's blood pressure:  125/74     mm Hg    Today's heart rate:  85     bpm     Ordering Provider: Dr. Day     Results sent to team # :    Additional comments: pt reports no symptoms

## 2020-11-11 RX ORDER — AMIODARONE HYDROCHLORIDE 200 MG/1
TABLET ORAL
Qty: 28 TABLET | Refills: 0 | COMMUNITY
Start: 2020-11-11 | End: 2020-11-19

## 2020-11-11 NOTE — TELEPHONE ENCOUNTER
Message left for patient to review recommendations per Dr Day.  Awaiting return call.  PRABHA Palomo

## 2020-11-11 NOTE — TELEPHONE ENCOUNTER
Spoke to patient regarding recommendations per Dr Day:  ---start amio 400 mg bid for 1 week then 200 mg daily.  ---Cardioversion in 1-2 weeks.   Orders placed in epic.  Medication list updated.  Patient had medication at home and does not need script sent to pharmacy.  Patient sent to scheduling to get this set up.  Patient provided verbal understanding regarding above. PRABHA Palomo

## 2020-11-11 NOTE — TELEPHONE ENCOUNTER
Just to be on the safe side, let's start amio 400 mg bid for 1 week then 200 mg daily. Cardioversion in 1-2 weeks. Thanks, qp

## 2020-11-12 NOTE — PROGRESS NOTES
"  Mike Schultz is a 70 year old male who is being evaluated via a billable video visit.      The patient has been notified of following:     \"This video visit will be conducted via a call between you and your physician/provider. We have found that certain health care needs can be provided without the need for an in-person physical exam.  This service lets us provide the care you need with a video conversation.  If a prescription is necessary we can send it directly to your pharmacy.  If lab work is needed we can place an order for that and you can then stop by our lab to have the test done at a later time.    Video visits are billed at different rates depending on your insurance coverage.  Please reach out to your insurance provider with any questions.    If during the course of the call the physician/provider feels a video visit is not appropriate, you will not be charged for this service.\"    Patient has given verbal consent for Video visit? Yes  How would you like to obtain your AVS? MyChart  If you are dropped from the video visit, the video invite should be resent to: Text to cell phone: [phone]  Will anyone else be joining your video visit? No        Vital signs reported by patient  BP.132/97   P. 65  Wt.  269LB  Ht.5'8\"  Ela Carias,Liss    HPI:   I had the pleasure of seeing Brayden when he came for pre-operative evaluation prior to planned cardioversion.  This 70 year old sees Dr. Day for his history of:    1. AFib dx'd in 2012. Failed flecainide (and noted scar on MRI). Underwent PVI and SVC isolation 11/2017. Repeat PVI and CTI for inducible AFlutter 4/2019. Now back in AFib after increasing alcohol intake. Started on Amio with plans for DCCV  2. Preserved EF based on echo 10/2017 and EF 51% on cMRI 10/2017  3. Benign Essential HTN   4. BOWEN  5. Myocardiac Scar noted on cardiac MRI 10/2017, consistent with prior MI in the left Cx territory. EF 51%.  Avoiding flecainide " use  6. H/o Alcohol dependence, had not had EtOH use since 4/2019, but increased intake recently  7. Chronic AC  On Xarelto for CHADSVASc 2 (HTN, age)    I had a virtual visit with Brayden 8/2020 at which time he was doing really well. Weights had been stable on decreased Lasix 20 mg daily and KCl 20 mEq daily. 3 m follow-up with fasting labs recommended.     He ended up calling us 11/9 (Monday) due to concerns for recurrent AFib noted on his Apple Watch.  HRs were only 80s, but watch noted an irregularity. Also noted swelling and stiffness of his hands/fingers.  Unfortunately, had started drinking EtOH again, 3-4 Truly's a few times/week, which was felt was likely the trigger for this.    EKG confirmed AFib at 85 bpm  Amiodarone 400 mg BID x 1 week, then 200 mg daily started with plan for DCCV in 1-2 weeks.     Interval History:  He typically gets a lot of water retention with his AFib, but notes he's not developed this yet.Notes hand stiffness and some shoulder stiffness x ~3 weeks. Unsure if this is due to AFib. Has seen Dr. Matthews in [facility] for this. Labs benign (ESR, RA). Hand XR without acute abnls. Dr. Matthews questioned bilateral carpal tunnel syndrome and has seen OT and prednisone started.  He doesn't think this has helped.    No c/o CP, SOB. No water retention, as above. No c/o palpitations, lightheadedness.         Recent Diagnostic Testing:  EKG 11/10/2020 sshowed AFib 85 bpm.  Echocardiogram 3/2019 showed EF of 55-60%.  No regional wall motion abnormalities were noted.  He had 1+ mitral regurgitation and 1+ tricuspid regurgitation.  RV was normal in structure, function and size.  Aortic valve showed just trace to mild aortic regurgitation with aortic sclerosis.  Mean gradient was 4.6 mmHg (improved from previous) volume index is 46.7 mL/m  with LA dimension of 4.9 cm.  Cardiac MRI 10/2017 showed an EF of 51% with mild hypokinesis involving the basal inferolateral segment.  Evidence of previous  "myocardial infarction in the circumflex territory was noted.      Assessment & Plan:    1. Recurrent Atrial Fibrillation    Unfortunately, he has had recurrent atrial fibrillation likely starting 11/5 based on his watch    Remains on Xarelto  - Dr. Day had requested he be on this for at least 2 years post ablation and he's missed no doses    Started amiodarone 400 mg BID x 2 weeks 11/11 (11/11-11/25), then to decrease to 200 mg daily 11/26    PLAN:  DCCV per Dr. Day , set for 11/19 @ 10 AM.  We discussed Risk, Benefits and Indication of proceeding with direct current cardioversion (DCCV), including but not limited to use of anesthesia, surface burns, [id number] arrhythmias requiring further treatment, discomfort and stroke.  The patient voiced understanding and understands that a  will be needed given the use of conscious sedation. A consent form will be signed by the procedural physician.  Arrive 0800 for 1000 procedure on 11/19/2020  Nothing to eat/drink after midnight except pills with small sip of water  Take all meds EXCEPT metoprolol 25 mg or furosemide 20 mg the morning of the procedure  COVID test 11/16  He's to call if he misses any doses of Xarelto 20 mg daily at night - confirms he's not missed any doses for >30 days        Follow-up with me 12/2020 as planned - can see if \"hand stiffness\" improved with restoration of SR    Crystal Campos PA-C, MSPAS      No orders of the defined types were placed in this encounter.    No orders of the defined types were placed in this encounter.    There are no discontinued medications.      Encounter Diagnoses   Name Primary?     Persistent atrial fibrillation (H) Yes     Essential hypertension        CURRENT MEDICATIONS:  Current Outpatient Medications   Medication Sig Dispense Refill     allopurinol (ZYLOPRIM) 300 MG tablet Take 1 tablet (300 mg) by mouth daily 90 tablet 3     amiodarone (PACERONE) 200 MG tablet Take 400mg (2 tabs) po bid x1 week than 200mg " (1 tab) po qd 28 tablet 0     amLODIPine (NORVASC) 2.5 MG tablet Take 1 tablet (2.5 mg) by mouth daily 90 tablet 3     cetirizine (ZYRTEC) 10 MG tablet Take 10 mg by mouth as needed for allergies       finasteride (PROPECIA) 1 MG tablet Take 1 tablet (1 mg) by mouth daily 90 tablet 3     furosemide (LASIX) 20 MG tablet Take 1 tablet (20 mg) by mouth daily       losartan (COZAAR) 100 MG tablet Take 1 tablet (100 mg) by mouth daily 90 tablet 1     metoprolol tartrate (LOPRESSOR) 50 MG tablet Take 0.5 tablets (25 mg) by mouth 2 times daily       order for DME Equipment ordered: RESMED CPAP Mask type: Full face  Settings: 15       potassium chloride ER (K-TAB/KLOR-CON) 10 MEQ CR tablet Take 2 tablets (20 mEq) by mouth daily 30 tablet 11     XARELTO ANTICOAGULANT 20 MG TABS tablet Take 1 tablet by mouth daily with dinner. Avoid use of alcohol while using medication. 90 tablet 0     predniSONE (DELTASONE) 20 MG tablet Take 1 tablet (20 mg) by mouth daily (Patient not taking: Reported on 11/13/2020) 10 tablet 0       ALLERGIES     Allergies   Allergen Reactions     No Known Drug Allergies        PAST MEDICAL HISTORY:  Past Medical History:   Diagnosis Date     H/O ETOH abuse      Herpes simplex without mention of complication      Hyperlipidemia      Obesity      Persistent atrial fibrillation (H)     persistent, DCCV 12/2017, ablation 11/6/17     Tobacco use disorder     dced 6/2004       PAST SURGICAL HISTORY:  Past Surgical History:   Procedure Laterality Date     ANESTHESIA CARDIOVERSION N/A 2/26/2019    Procedure: ANESTHESIA CARDIOVERSION (PRIETO);  Surgeon: GENERIC ANESTHESIA PROVIDER;  Location:  OR     C TOTAL KNEE ARTHROPLASTY Left 03/16/2018    Dr. Malick Suarez     EP ABLATION FOCAL AFIB N/A 4/12/2019    Procedure: EP Ablation Focal AFIB;  Surgeon: Fady Prieto MD;  Location:  HEART [facility]ARDI[facility] CATH LAB     HERNIORRHAPHY UMBILICAL  8/20/2012    Procedure: HERNIORRHAPHY UMBILICAL;  UMBILICAL HERNIA REPAIR;   Surgeon: Ra Crocker MD;  Location: [facility]     ORTHOPEDIC SURGERY       [facility] NONSPECIFIC PROCEDURE      achilles tendon     Z NONSPECIFIC PROCEDURE      knees, arthroscopy     Z NONSPECIFIC PROCEDURE      basal cell skin ca, nose     Z NONSPECIFIC PROCEDURE      flex sig; colonoscopy due 3/2008     Z NONSPECIFIC PROCEDURE  2012    cardioversion for atr fib       FAMILY HISTORY:  Family History   Problem Relation Age of Onset     Family History Negative Mother      Heart Disease Father         decesased at 42 - MI     Heart Disease Sister          MI     Heart Disease Brother          MI     Heart Disease Brother         CABG AT 49     Lipids Sister      Lipids Sister      Lipids Sister      Family History Negative Brother        SOCIAL HISTORY:  Social History     Socioeconomic History     Marital status: Single     Spouse name: None     Number of children: None     Years of education: None     Highest education level: None   Occupational History     None   Social Needs     Financial resource strain: None     Food insecurity     Worry: None     Inability: None     Transportation needs     Medical: None     Non-medical: None   Tobacco Use     Smoking status: Former Smoker     Packs/day: 1.00     Years: 10.00     Pack years: 10.00     Types: Cigarettes     Start date:      Quit date: 2011     Years since quittin.2     Smokeless tobacco: Never Used   Substance and Sexual Activity     Alcohol use: No     Frequency: Never     Drug use: No     Sexual activity: Yes     Partners: Female   Lifestyle     Physical activity     Days per week: None     Minutes per session: None     Stress: None   Relationships     Social connections     Talks on phone: None     Gets together: None     Attends Protestant service: None     Active member of club or organization: None     Attends meetings of clubs or organizations: None     Relationship status: None     Intimate partner violence     Fear  of current or ex partner: None     Emotionally abused: None     Physically abused: None     Forced sexual activity: None   Other Topics Concern     Parent/sibling w/ CABG, MI or angioplasty before 65F 55M? Not Asked   Social History Narrative     None       Review of Systems:  Skin:        Eyes:       ENT:       Respiratory:       Cardiovascular:       Gastroenterology:      Genitourinary:       Musculoskeletal:       Neurologic:       Psychiatric:       Heme/Lymph/Imm:       Endocrine:         Physical Exam:  Vitals: There were no vitals taken for this visit.    Constitutional:           Skin:           Head:           Eyes:           ENT:           Neck:           Chest:           Cardiac:                    Abdomen:           Vascular:                                        Extremities and Back:           Neurological:           Recent Lab Results:  LIPID RESULTS:  Lab Results   Component Value Date    CHOL 163 08/14/2020    HDL 37 (L) 08/14/2020    LDL 85 08/14/2020    TRIG 206 (H) 08/14/2020    CHOLHDLRATIO 3.8 03/16/2015       LIVER ENZYME RESULTS:  Lab Results   Component Value Date    AST 20 09/21/2020    ALT 28 09/21/2020       CBC RESULTS:  Lab Results   Component Value Date    WBC 9.8 09/21/2020    RBC 4.47 09/21/2020    HGB 14.4 09/21/2020    HCT 44.7 09/21/2020     09/21/2020    MCH 32.2 09/21/2020    MCHC 32.2 09/21/2020    RDW 13.3 09/21/2020     09/21/2020       BMP RESULTS:  Lab Results   Component Value Date     09/21/2020    POTASSIUM 4.0 09/21/2020    CHLORIDE 107 09/21/2020    CO2 27 09/21/2020    ANIONGAP 6 09/21/2020    GLC 96 09/21/2020    BUN 18 09/21/2020    CR 0.97 09/21/2020    GFRESTIMATED 79 09/21/2020    GFRESTBLACK >90 09/21/2020    ASHELY 8.6 09/21/2020            Video-Visit Details    Type of service:  Video Visit    Video Start Time: 1032  Video End Time: 1050  Duration 18 minutes    Originating Location (pt. Location): Home    Distant Location (provider  location):  Home Office    Platform used for Video Visit: Jeremy Campos PA-C

## 2020-11-13 ENCOUNTER — VIRTUAL VISIT (OUTPATIENT)
Dept: CARDIOLOGY | Facility: CLINIC | Age: 70
End: 2020-11-13
Payer: COMMERCIAL

## 2020-11-13 DIAGNOSIS — I48.19 PERSISTENT ATRIAL FIBRILLATION (H): Primary | ICD-10-CM

## 2020-11-13 DIAGNOSIS — I10 ESSENTIAL HYPERTENSION: ICD-10-CM

## 2020-11-13 PROCEDURE — 99214 OFFICE O/P EST MOD 30 MIN: CPT | Mod: 95 | Performed by: PHYSICIAN ASSISTANT

## 2020-11-13 NOTE — LETTER
"11/13/2020    Johnny Kay MD  [address] [facility] [address]    RE: Mike Schultz       Dear Colleague,    I had the pleasure of seeing Mike Schultz in the [facility].      Mike Schultz is a 70 year old male who is being evaluated via a billable video visit.      The patient has been notified of following:     \"This video visit will be conducted via a call between you and your physician/provider. We have found that certain health care needs can be provided without the need for an in-person physical exam.  This service lets us provide the care you need with a video conversation.  If a prescription is necessary we can send it directly to your pharmacy.  If lab work is needed we can place an order for that and you can then stop by our lab to have the test done at a later time.    Video visits are billed at different rates depending on your insurance coverage.  Please reach out to your insurance provider with any questions.    If during the course of the call the physician/provider feels a video visit is not appropriate, you will not be charged for this service.\"    Patient has given verbal consent for Video visit? Yes  How would you like to obtain your AVS? MyChart  If you are dropped from the video visit, the video invite should be resent to: Text to cell phone: [phone]  Will anyone else be joining your video visit? No        Vital signs reported by patient  BP.132/97   P. 65  Wt.  269LB  Ht.5'8\"  [facility] [address]    HPI:   I had the pleasure of seeing Brayden when he came for pre-operative evaluation prior to planned cardioversion.  This 70 year old sees Dr. Day for his history of:    1. AFib dx'd in 2012. Failed flecainide (and noted scar on MRI). Underwent PVI and SVC isolation 11/2017. Repeat PVI and CTI for inducible AFlutter 4/2019. Now back in AFib after increasing alcohol intake. Started on Amio with plans for DCCV  2. Preserved EF based on echo " 10/2017 and EF 51% on cMRI 10/2017  3. Benign Essential HTN   4. BOWEN  5. Myocardiac Scar noted on cardiac MRI 10/2017, consistent with prior MI in the left Cx territory. EF 51%.  Avoiding flecainide use  6. H/o Alcohol dependence, had not had EtOH use since 4/2019, but increased intake recently  7. Chronic AC  On Xarelto for CHADSVASc 2 (HTN, age)    I had a virtual visit with Brayden 8/2020 at which time he was doing really well. Weights had been stable on decreased Lasix 20 mg daily and KCl 20 mEq daily. 3 m follow-up with fasting labs recommended.     He ended up calling us 11/9 (Monday) due to concerns for recurrent AFib noted on his Apple Watch.  HRs were only 80s, but watch noted an irregularity. Also noted swelling and stiffness of his hands/fingers.  Unfortunately, had started drinking EtOH again, 3-4 Truly's a few times/week, which was felt was likely the trigger for this.    EKG confirmed AFib at 85 bpm  Amiodarone 400 mg BID x 1 week, then 200 mg daily started with plan for DCCV in 1-2 weeks.     Interval History:  He typically gets a lot of water retention with his AFib, but notes he's not developed this yet.Notes hand stiffness and some shoulder stiffness x ~3 weeks. Unsure if this is due to AFib. Has seen Dr. Matthews in [facility] for this. Labs benign (ESR, RA). Hand XR without acute abnls. Dr. Matthews questioned bilateral carpal tunnel syndrome and has seen OT and prednisone started.  He doesn't think this has helped.    No c/o CP, SOB. No water retention, as above. No c/o palpitations, lightheadedness.         Recent Diagnostic Testing:  EKG 11/10/2020 sshowed AFib 85 bpm.  Echocardiogram 3/2019 showed EF of 55-60%.  No regional wall motion abnormalities were noted.  He had 1+ mitral regurgitation and 1+ tricuspid regurgitation.  RV was normal in structure, function and size.  Aortic valve showed just trace to mild aortic regurgitation with aortic sclerosis.  Mean gradient was 4.6 mmHg (improved from  "previous) volume index is 46.7 mL/m  with LA dimension of 4.9 cm.  Cardiac MRI 10/2017 showed an EF of 51% with mild hypokinesis involving the basal inferolateral segment.  Evidence of previous myocardial infarction in the circumflex territory was noted.      Assessment & Plan:    1. Recurrent Atrial Fibrillation    Unfortunately, he has had recurrent atrial fibrillation likely starting 11/5 based on his watch    Remains on Xarelto  - Dr. Day had requested he be on this for at least 2 years post ablation and he's missed no doses    Started amiodarone 400 mg BID x 2 weeks 11/11 (11/11-11/25), then to decrease to 200 mg daily 11/26    PLAN:  DCCV per Dr. Day , set for 11/19 @ 10 AM.  We discussed Risk, Benefits and Indication of proceeding with direct current cardioversion (DCCV), including but not limited to use of anesthesia, surface burns, [id number] arrhythmias requiring further treatment, discomfort and stroke.  The patient voiced understanding and understands that a  will be needed given the use of conscious sedation. A consent form will be signed by the procedural physician.  Arrive 0800 for 1000 procedure on 11/19/2020  Nothing to eat/drink after midnight except pills with small sip of water  Take all meds EXCEPT metoprolol 25 mg or furosemide 20 mg the morning of the procedure  COVID test 11/16  He's to call if he misses any doses of Xarelto 20 mg daily at night - confirms he's not missed any doses for >30 days        Follow-up with me 12/2020 as planned - can see if \"hand stiffness\" improved with restoration of SR    Crystal Campos PA-C, MSPAS      No orders of the defined types were placed in this encounter.    No orders of the defined types were placed in this encounter.    There are no discontinued medications.      Encounter Diagnoses   Name Primary?     Persistent atrial fibrillation (H) Yes     Essential hypertension        CURRENT MEDICATIONS:  Current Outpatient Medications   Medication Sig " Dispense Refill     allopurinol (ZYLOPRIM) 300 MG tablet Take 1 tablet (300 mg) by mouth daily 90 tablet 3     amiodarone (PACERONE) 200 MG tablet Take 400mg (2 tabs) po bid x1 week than 200mg (1 tab) po qd 28 tablet 0     amLODIPine (NORVASC) 2.5 MG tablet Take 1 tablet (2.5 mg) by mouth daily 90 tablet 3     cetirizine (ZYRTEC) 10 MG tablet Take 10 mg by mouth as needed for allergies       finasteride (PROPECIA) 1 MG tablet Take 1 tablet (1 mg) by mouth daily 90 tablet 3     furosemide (LASIX) 20 MG tablet Take 1 tablet (20 mg) by mouth daily       losartan (COZAAR) 100 MG tablet Take 1 tablet (100 mg) by mouth daily 90 tablet 1     metoprolol tartrate (LOPRESSOR) 50 MG tablet Take 0.5 tablets (25 mg) by mouth 2 times daily       order for DME Equipment ordered: RESMED CPAP Mask type: Full face  Settings: 15       potassium chloride ER (K-TAB/KLOR-CON) 10 MEQ CR tablet Take 2 tablets (20 mEq) by mouth daily 30 tablet 11     XARELTO ANTICOAGULANT 20 MG TABS tablet Take 1 tablet by mouth daily with dinner. Avoid use of alcohol while using medication. 90 tablet 0     predniSONE (DELTASONE) 20 MG tablet Take 1 tablet (20 mg) by mouth daily (Patient not taking: Reported on 11/13/2020) 10 tablet 0       ALLERGIES     Allergies   Allergen Reactions     No Known Drug Allergies        PAST MEDICAL HISTORY:  Past Medical History:   Diagnosis Date     H/O ETOH abuse      Herpes simplex without mention of complication      Hyperlipidemia      Obesity      Persistent atrial fibrillation (H)     persistent, DCCV 12/2017, ablation 11/6/17     Tobacco use disorder     dced 6/2004       PAST SURGICAL HISTORY:  Past Surgical History:   Procedure Laterality Date     ANESTHESIA CARDIOVERSION N/A 2/26/2019    Procedure: ANESTHESIA CARDIOVERSION (PRIETO);  Surgeon: GENERIC ANESTHESIA PROVIDER;  Location: [facility] OR     C TOTAL KNEE ARTHROPLASTY Left 03/16/2018    Dr. Malick Suarez     EP ABLATION FOCAL AFIB N/A 4/12/2019    Procedure: EP  Ablation Focal AFIB;  Surgeon: Fady Day MD;  Location:  HEART CARDIAC CATH LAB     HERNIORRHAPHY UMBILICAL  2012    Procedure: HERNIORRHAPHY UMBILICAL;  UMBILICAL HERNIA REPAIR;  Surgeon: Ra Crocker MD;  Location:  [facility]     ORTHOPEDIC SURGERY       [facility] NONSPECIFIC PROCEDURE      achilles tendon     Z NONSPECIFIC PROCEDURE      knees, arthroscopy     Z NONSPECIFIC PROCEDURE      basal cell skin ca, nose     Z NONSPECIFIC PROCEDURE      flex sig; colonoscopy due 3/2008     Z NONSPECIFIC PROCEDURE      cardioversion for atr fib       FAMILY HISTORY:  Family History   Problem Relation Age of Onset     Family History Negative Mother      Heart Disease Father         decesased at 42 - MI     Heart Disease Sister          MI     Heart Disease Brother          MI     Heart Disease Brother         CABG AT 49     Lipids Sister      Lipids Sister      Lipids Sister      Family History Negative Brother        SOCIAL HISTORY:  Social History     Socioeconomic History     Marital status: Single     Spouse name: None     Number of children: None     Years of education: None     Highest education level: None   Occupational History     None   Social Needs     Financial resource strain: None     Food insecurity     Worry: None     Inability: None     Transportation needs     Medical: None     Non-medical: None   Tobacco Use     Smoking status: Former Smoker     Packs/day: 1.00     Years: 10.00     Pack years: 10.00     Types: Cigarettes     Start date:      Quit date: 2011     Years since quittin.2     Smokeless tobacco: Never Used   Substance and Sexual Activity     Alcohol use: No     Frequency: Never     Drug use: No     Sexual activity: Yes     Partners: Female   Lifestyle     Physical activity     Days per week: None     Minutes per session: None     Stress: None   Relationships     Social connections     Talks on phone: None     Gets together: None     Attends  Adventism service: None     Active member of club or organization: None     Attends meetings of clubs or organizations: None     Relationship status: None     Intimate partner violence     Fear of current or ex partner: None     Emotionally abused: None     Physically abused: None     Forced sexual activity: None   Other Topics Concern     Parent/sibling w/ CABG, MI or angioplasty before 65F 55M? Not Asked   Social History Narrative     None       Review of Systems:  Skin:        Eyes:       ENT:       Respiratory:       Cardiovascular:       Gastroenterology:      Genitourinary:       Musculoskeletal:       Neurologic:       Psychiatric:       Heme/Lymph/Imm:       Endocrine:         Physical Exam:  Vitals: There were no vitals taken for this visit.    Constitutional:           Skin:           Head:           Eyes:           ENT:           Neck:           Chest:           Cardiac:                    Abdomen:           Vascular:                                        Extremities and Back:           Neurological:           Recent Lab Results:  LIPID RESULTS:  Lab Results   Component Value Date    CHOL 163 08/14/2020    HDL 37 (L) 08/14/2020    LDL 85 08/14/2020    TRIG 206 (H) 08/14/2020    CHOLHDLRATIO 3.8 03/16/2015       LIVER ENZYME RESULTS:  Lab Results   Component Value Date    AST 20 09/21/2020    ALT 28 09/21/2020       CBC RESULTS:  Lab Results   Component Value Date    WBC 9.8 09/21/2020    RBC 4.47 09/21/2020    HGB 14.4 09/21/2020    HCT 44.7 09/21/2020     09/21/2020    MCH 32.2 09/21/2020    MCHC 32.2 09/21/2020    RDW 13.3 09/21/2020     09/21/2020       BMP RESULTS:  Lab Results   Component Value Date     09/21/2020    POTASSIUM 4.0 09/21/2020    CHLORIDE 107 09/21/2020    CO2 27 09/21/2020    ANIONGAP 6 09/21/2020    GLC 96 09/21/2020    BUN 18 09/21/2020    CR 0.97 09/21/2020    GFRESTIMATED 79 09/21/2020    GFRESTBLACK >90 09/21/2020    ASHELY 8.6 09/21/2020            Video-Visit  Details    Type of service:  Video Visit    Video Start Time: 1032  Video End Time: 1050  Duration 18 minutes    Originating Location (pt. Location): Home    Distant Location (provider location):  Home Office    Platform used for Video Visit: Jeremy        Thank you for allowing me to participate in the care of your patient.    Sincerely,     Natalie Campos PA-C     [facility]

## 2020-11-13 NOTE — PATIENT INSTRUCTIONS
"Brayden - it was nice to speak with you today!    1. Reviewed that you're back in AFib - may be due to restarting alcohol use  2. Dr. Day reviewed and started you on amiodarone 400 mg twice a day, getting a repeat cardioversion, and then continued amiodarone and follow-up with me    PLAN:  1. Cardioversion 11/19/2020  Arrive 0800 for 1000 procedure on 11/19/2020  Nothing to eat/drink after midnight except pills with small sip of water  Take all meds EXCEPT metoprolol 25 mg or furosemide 20 mg the morning of the procedure  COVID test 11/16 - self-isolate between the test and your procedure  CALL if you miss any doses of Xarelto 20 mg daily at night    2. Follow-up with me 12/2020 as planned - can see if \"hand stiffness\" improved with restoration of SR!    [phone] if any questions/concerns    "

## 2020-11-16 DIAGNOSIS — Z11.59 ENCOUNTER FOR SCREENING FOR OTHER VIRAL DISEASES: Primary | ICD-10-CM

## 2020-11-16 DIAGNOSIS — Z11.59 ENCOUNTER FOR SCREENING FOR OTHER VIRAL DISEASES: ICD-10-CM

## 2020-11-16 PROCEDURE — U0003 INFECTIOUS AGENT DETECTION BY NUCLEIC ACID (DNA OR RNA); SEVERE ACUTE RESPIRATORY SYNDROME CORONAVIRUS 2 (SARS-COV-2) (CORONAVIRUS DISEASE [COVID-19]), AMPLIFIED PROBE TECHNIQUE, MAKING USE OF HIGH THROUGHPUT TECHNOLOGIES AS DESCRIBED BY CMS-2020-01-R: HCPCS | Performed by: INTERNAL MEDICINE

## 2020-11-17 LAB
SARS-COV-2 RNA SPEC QL NAA+PROBE: NOT DETECTED
SPECIMEN SOURCE: NORMAL

## 2020-11-19 ENCOUNTER — ANESTHESIA EVENT (OUTPATIENT)
Dept: SURGERY | Facility: CLINIC | Age: 70
End: 2020-11-19
Payer: COMMERCIAL

## 2020-11-19 ENCOUNTER — DOCUMENTATION ONLY (OUTPATIENT)
Dept: CARDIOLOGY | Facility: CLINIC | Age: 70
End: 2020-11-19

## 2020-11-19 ENCOUNTER — HOSPITAL ENCOUNTER (OUTPATIENT)
Facility: CLINIC | Age: 70
Discharge: HOME | End: 2020-11-19
Admitting: INTERNAL MEDICINE
Payer: COMMERCIAL

## 2020-11-19 ENCOUNTER — HOSPITAL ENCOUNTER (OUTPATIENT)
Dept: MEDSURG UNIT | Facility: CLINIC | Age: 70
End: 2020-11-19
Attending: INTERNAL MEDICINE
Payer: COMMERCIAL

## 2020-11-19 ENCOUNTER — ANESTHESIA (OUTPATIENT)
Dept: SURGERY | Facility: CLINIC | Age: 70
End: 2020-11-19
Payer: COMMERCIAL

## 2020-11-19 VITALS
HEIGHT: 68 IN | HEART RATE: 52 BPM | DIASTOLIC BLOOD PRESSURE: 78 MMHG | RESPIRATION RATE: 16 BRPM | OXYGEN SATURATION: 96 % | SYSTOLIC BLOOD PRESSURE: 128 MMHG | WEIGHT: 283.7 LBS | TEMPERATURE: 97.9 F | BODY MASS INDEX: 43 KG/M2

## 2020-11-19 DIAGNOSIS — I48.19 PERSISTENT ATRIAL FIBRILLATION (H): Primary | ICD-10-CM

## 2020-11-19 DIAGNOSIS — I48.19 PERSISTENT ATRIAL FIBRILLATION (H): ICD-10-CM

## 2020-11-19 LAB
MAGNESIUM SERPL-MCNC: 2.1 MG/DL (ref 1.6–2.3)
POTASSIUM SERPL-SCNC: 4 MMOL/L (ref 3.4–5.3)

## 2020-11-19 PROCEDURE — 92960 CARDIOVERSION ELECTRIC EXT: CPT

## 2020-11-19 PROCEDURE — 92960 CARDIOVERSION ELECTRIC EXT: CPT | Performed by: INTERNAL MEDICINE

## 2020-11-19 PROCEDURE — 999N000184 HC STATISTIC TELEMETRY

## 2020-11-19 PROCEDURE — 83735 ASSAY OF MAGNESIUM: CPT | Performed by: INTERNAL MEDICINE

## 2020-11-19 PROCEDURE — 370N000003 HC ANESTHESIA WARD SERVICE: Performed by: ANESTHESIOLOGY

## 2020-11-19 PROCEDURE — 84132 ASSAY OF SERUM POTASSIUM: CPT | Performed by: INTERNAL MEDICINE

## 2020-11-19 PROCEDURE — 36415 COLL VENOUS BLD VENIPUNCTURE: CPT

## 2020-11-19 PROCEDURE — 999N000010 HC STATISTIC ANES STAT CODE-CRNA PER MINUTE

## 2020-11-19 PROCEDURE — 370N000001 HC ANESTHESIA TECHNICAL FEE, 1ST 30 MIN

## 2020-11-19 PROCEDURE — 93010 ELECTROCARDIOGRAM REPORT: CPT | Mod: 76 | Performed by: INTERNAL MEDICINE

## 2020-11-19 PROCEDURE — 250N000011 HC RX IP 250 OP 636: Performed by: NURSE ANESTHETIST, CERTIFIED REGISTERED

## 2020-11-19 PROCEDURE — 93005 ELECTROCARDIOGRAM TRACING: CPT

## 2020-11-19 RX ORDER — SODIUM CHLORIDE 9 MG/ML
INJECTION, SOLUTION INTRAVENOUS CONTINUOUS
Status: DISCONTINUED | OUTPATIENT
Start: 2020-11-19 | End: 2020-11-19 | Stop reason: HOSPADM

## 2020-11-19 RX ORDER — POTASSIUM CHLORIDE 1500 MG/1
40 TABLET, EXTENDED RELEASE ORAL
Status: DISCONTINUED | OUTPATIENT
Start: 2020-11-19 | End: 2020-11-19 | Stop reason: HOSPADM

## 2020-11-19 RX ORDER — NALOXONE HYDROCHLORIDE 0.4 MG/ML
0.4 INJECTION, SOLUTION INTRAMUSCULAR; INTRAVENOUS; SUBCUTANEOUS
Status: DISCONTINUED | OUTPATIENT
Start: 2020-11-19 | End: 2020-11-19 | Stop reason: HOSPADM

## 2020-11-19 RX ORDER — NALOXONE HYDROCHLORIDE 0.4 MG/ML
0.2 INJECTION, SOLUTION INTRAMUSCULAR; INTRAVENOUS; SUBCUTANEOUS
Status: DISCONTINUED | OUTPATIENT
Start: 2020-11-19 | End: 2020-11-19 | Stop reason: HOSPADM

## 2020-11-19 RX ORDER — ATROPINE SULFATE 0.1 MG/ML
.5-1 INJECTION INTRAVENOUS
Status: DISCONTINUED | OUTPATIENT
Start: 2020-11-19 | End: 2020-11-19 | Stop reason: HOSPADM

## 2020-11-19 RX ORDER — FLUMAZENIL 0.1 MG/ML
0.2 INJECTION, SOLUTION INTRAVENOUS
Status: DISCONTINUED | OUTPATIENT
Start: 2020-11-19 | End: 2020-11-19 | Stop reason: HOSPADM

## 2020-11-19 RX ORDER — POTASSIUM CHLORIDE 1500 MG/1
20 TABLET, EXTENDED RELEASE ORAL
Status: DISCONTINUED | OUTPATIENT
Start: 2020-11-19 | End: 2020-11-19 | Stop reason: HOSPADM

## 2020-11-19 RX ORDER — MAGNESIUM SULFATE HEPTAHYDRATE 40 MG/ML
2 INJECTION, SOLUTION INTRAVENOUS
Status: DISCONTINUED | OUTPATIENT
Start: 2020-11-19 | End: 2020-11-19 | Stop reason: HOSPADM

## 2020-11-19 RX ORDER — AMIODARONE HYDROCHLORIDE 200 MG/1
200 TABLET ORAL DAILY
Start: 2020-11-19 | End: 2021-01-07

## 2020-11-19 RX ORDER — PROPOFOL 10 MG/ML
INJECTION, EMULSION INTRAVENOUS PRN
Status: DISCONTINUED | OUTPATIENT
Start: 2020-11-19 | End: 2020-11-19

## 2020-11-19 RX ADMIN — PROPOFOL 150 MG: 10 INJECTION, EMULSION INTRAVENOUS at 10:02

## 2020-11-19 ASSESSMENT — ENCOUNTER SYMPTOMS: DYSRHYTHMIAS: 1

## 2020-11-19 ASSESSMENT — MIFFLIN-ST. JEOR: SCORE: 2026.35

## 2020-11-19 NOTE — ANESTHESIA POSTPROCEDURE EVALUATION
Patient: Mike Schultz    Procedure(s):  ANESTHESIA, FOR CARDIOVERSION (dr campbell)    Diagnosis:Atrial fibrillation, unspecified type (H) [I48.91]  Diagnosis Additional Information: No value filed.    Anesthesia Type:  MAC    Note:  Anesthesia Post Evaluation    Patient location during evaluation: PACU  Patient participation: Able to fully participate in evaluation  Level of consciousness: awake  Pain management: adequate  Airway patency: patent  Cardiovascular status: acceptable  Respiratory status: acceptable  Hydration status: acceptable  PONV: none     Anesthetic complications: None          Last vitals:  Vitals:    11/19/20 0900 11/19/20 1004 11/19/20 1010   BP: (!) 148/94 126/82 117/71   Pulse:  55 57   Resp:  11 11   Temp:      SpO2:  95% 96%         Electronically Signed By: Shanthi Edwards  November 19, 2020  10:20 AM

## 2020-11-19 NOTE — ANESTHESIA PREPROCEDURE EVALUATION
Anesthesia Pre-Procedure Evaluation    Patient: Mike Schultz   MRN: [id number] : 1950          Preoperative Diagnosis: Atrial fibrillation, unspecified type (H) [I48.91]    Procedure(s):  ANESTHESIA, FOR CARDIOVERSION (dr campbell)    Past Medical History:   Diagnosis Date     H/O ETOH abuse      Herpes simplex without mention of complication      Hyperlipidemia      Obesity      Persistent atrial fibrillation (H)     persistent, DCCV 2017, ablation 17     Tobacco use disorder     dced 2004     Past Surgical History:   Procedure Laterality Date     ANESTHESIA CARDIOVERSION N/A 2019    Procedure: ANESTHESIA CARDIOVERSION (PRIETO);  Surgeon: GENERIC ANESTHESIA PROVIDER;  Location:  OR     C TOTAL KNEE ARTHROPLASTY Left 2018    Dr. Malick Suarez     EP ABLATION FOCAL AFIB N/A 2019    Procedure: EP Ablation Focal AFIB;  Surgeon: Fady Prieto MD;  Location:  HEART CARDIAC CATH LAB     HERNIORRHAPHY UMBILICAL  2012    Procedure: HERNIORRHAPHY UMBILICAL;  UMBILICAL HERNIA REPAIR;  Surgeon: Ra Crocker MD;  Location: [facility]     ORTHOPEDIC SURGERY       [facility] NONSPECIFIC PROCEDURE      achilles tendon     [facility] NONSPECIFIC PROCEDURE      knees, arthroscopy     [facility] NONSPECIFIC PROCEDURE      basal cell skin ca, nose     [facility] NONSPECIFIC PROCEDURE      flex sig; colonoscopy due 3/2008     [facility] NONSPECIFIC PROCEDURE      cardioversion for atr fib       Anesthesia Evaluation     . Pt has had prior anesthetic.     No history of anesthetic complications          ROS/MED HX    ENT/Pulmonary:     (+)sleep apnea, , . .    Neurologic:       Cardiovascular:     (+) hypertension----. : . . . :. dysrhythmias a-fib, valvular problems/murmurs .       METS/Exercise Tolerance:     Hematologic:         Musculoskeletal:         GI/Hepatic:        (-) GERD   Renal/Genitourinary:         Endo:     (+) Obesity, .      Psychiatric:         Infectious Disease:         Malignancy:         Other:   "                        Physical Exam  Normal systems: dental    Airway   Mallampati: II  TM distance: >3 FB  Neck ROM: full    Dental     Cardiovascular   Rhythm and rate: regular and normal      Pulmonary    breath sounds clear to auscultation            Lab Results   Component Value Date    WBC 9.8 09/21/2020    HGB 14.4 09/21/2020    HCT 44.7 09/21/2020     09/21/2020    CRP 27.0 (H) 10/06/2020    SED 18 10/06/2020     09/21/2020    POTASSIUM 4.0 11/19/2020    CHLORIDE 107 09/21/2020    CO2 27 09/21/2020    BUN 18 09/21/2020    CR 0.97 09/21/2020    GLC 96 09/21/2020    ASHELY 8.6 09/21/2020    PHOS 3.1 09/21/2020    MAG 2.1 11/19/2020    ALBUMIN 3.2 (L) 09/21/2020    PROTTOTAL 7.2 09/21/2020    ALT 28 09/21/2020    AST 20 09/21/2020    ALKPHOS 92 09/21/2020    BILITOTAL 0.3 09/21/2020    INR 2.04 (H) 02/26/2019    TSH 0.82 09/21/2020       Preop Vitals  BP Readings from Last 3 Encounters:   11/19/20 117/71   10/15/20 (!) 154/96   10/06/20 138/80    Pulse Readings from Last 3 Encounters:   11/19/20 57   10/06/20 64   08/17/20 57      Resp Readings from Last 3 Encounters:   11/19/20 11   10/06/20 16   04/13/19 18    SpO2 Readings from Last 3 Encounters:   11/19/20 96%   10/06/20 96%   04/13/19 94%      Temp Readings from Last 1 Encounters:   11/19/20 36.6  C (97.9  F) (Oral)    Ht Readings from Last 1 Encounters:   11/19/20 1.727 m (5' 8\")      Wt Readings from Last 1 Encounters:   11/19/20 128.7 kg (283 lb 11.2 oz)    Estimated body mass index is 43.14 kg/m  as calculated from the following:    Height as of this encounter: 1.727 m (5' 8\").    Weight as of this encounter: 128.7 kg (283 lb 11.2 oz).       Anesthesia Plan      History & Physical Review  History and physical reviewed and following examination; no interval change.    ASA Status:  3 .        Plan for MAC            Postoperative Care      Consents  Anesthetic plan, risks, benefits and alternatives discussed with:  Patient..           "       Shanthi Edwards

## 2020-11-19 NOTE — PRE-PROCEDURE
GENERAL PRE-PROCEDURE:   Procedure:  Cardioversion  Date/Time:  11/19/2020 10:10 AM    Verbal consent obtained?: Yes    Written consent obtained?: Yes    Risks and benefits: Risks, benefits and alternatives were discussed    Consent given by:  Patient  Patient states understanding of procedure being performed: Yes    Patient's understanding of procedure matches consent: Yes    Procedure consent matches procedure scheduled: Yes    Expected level of sedation:  Deep  Appropriately NPO:  Yes  ASA Class:  Class 2- mild systemic disease, no acute problems, no functional limitations  Mallampati  :  Grade 3- soft palate visible, posterior pharyngeal wall not visible  Lungs:  Lungs clear with good breath sounds bilaterally  Heart:  A-fib  History & Physical reviewed:  History and physical reviewed and no updates needed  Statement of review:  I have reviewed the lab findings, diagnostic data, medications, and the plan for sedation

## 2020-11-19 NOTE — PROGRESS NOTES
Care Suites Admission Nursing Note    Patient Information  Name: Mike Schultz  Age: 70 year old  Reason for admission: Cardioversion  Care Suites arrival time: 0800    Visitor Information  Name: n/a  Informed of visitor restrictions: N/A  1 visitor allowed per patient   Visitor must screen negative for COVID symptoms   Visitor must wear a mask  Waiting rooms closed to visitors    Patient Admission/Assessment   Pre-procedure assessment complete: Yes  If abnormal assessment/labs, provider notified: N/A  NPO: Yes  Medications held per instructions/orders: Yes  Consent: obtained  If applicable, pregnancy test status: n/a  Patient oriented to room: Yes  Education/questions answered: Yes  Plan/other: proceed    Discharge Planning  Discharge name/phone number: juan- spouse   Overnight post sedation caregiver: spouse  Discharge location: home  PATIENT/VISITOR WELLNESS SCREENING    Step 1 Patient Screening    1. In the last month, have you been in contact with someone who was confirmed or suspected to have Coronavirus/COVID-19? No    2. Do you have the following symptoms?  Fever/Chills? No   Cough? No   Shortness of breath? No   New loss of taste or smell? No  Sore throat? No  Muscle or body aches? No  Headaches? No  Fatigue? No  Vomiting or diarrhea? No    Step 2 Visitor Screening    1. Name of Visitor (1 visitor per patient): n/a          If the visitor has positive symptoms, notify supervisor/manger  Per policy, the visitor will need to leave the facility     Step 3 Refer to logic grid below for actions    NO SYMPTOM(S)    ACTIONS:  1. Standard rooming process  2. Provider to assess per normal protocol  3. Implement precautions as needed and per guidelines     POSITIVE SYMPTOM(S)  If positive for ANY of the following symptoms: fever, cough, shortness of breath, rash    ACTION:  1. Continue to have the patient wear a mask   2. Room patient as soon as possible  3. Don appropriate PPE when entering  room  4. Provider evaluation    Tom Corbett RN

## 2020-11-19 NOTE — PROGRESS NOTES
Spoke to patient regarding recommendattions per TOBI Rivas s/p successful cardioversion.    ---Continue metoprolol tartrate 25 mg BID (skipped this AM's dose d/t cardioversion)   ---DECREASE amiodarone to 200 mg daily if he hasn't already done so (was to load 400 mg BID x 1 week, then 200 mg daily starting today).  Patient scheduled for follow up labs (lipid panel, BMP and ALT) patient aware he needs to be fasting and follow up appt on 12/10 with TOBI Rivas.  Patient provided verbal understanding regarding above.  PRABHA Palomo

## 2020-11-19 NOTE — DISCHARGE INSTRUCTIONS
Cardioversion Discharge Instructions    After you go home:       For 24 hours - due to the sedation you received:      Have an adult stay with you for 24 hours.     Relax and take it easy.    Do NOT make any important or legal decisions.    Do NOT drive or operate machines at home or at work.    Do NOT drink alcohol.    Diet:      Start with clear liquids and progress to your normal diet as you feel able.    Medicines:      Take your medications, including blood thinners, unless your provider tells you not to.    If you have stopped any medications, check with your provider about when to restart them.    Follow Up Appointments:      Follow up with your cardiologist at [facility] of patient preference as instructed.    Follow up with your primary care provider as needed.    Post cardioversion:    The skin on your chest or back may feel tender for 48 hours.  If your skin is tender, you may:      Use a cold pack on the site. Never use ice directly on your skin. Use the cold pack for 20 minutes. Remove it for at least 30 minutes before re-using.    Apply 1% hydrocortisone cream to the skin (sold at drug stores)    Take Advil (Ibuprofen) or Tylenol (Acetaminophen) per your provider's recommendations.      Call your provider if you have:      Weakness, dizziness, lightheadedness, or fainting.    Shortness of breath.    Irregular heartbeat, feelings of your heart fluttering or beating fast, hard or palpitations.     More than minor skin discomfort or redness where the cardioversion pads were placed.    Questions or concerns.      Call 911 if you have:      Pain in your chest, arm, shoulder, neck, or upper back.    You have problems speaking or seeing.    Weakness in your arm or leg.    You are unable to move your arm or leg.    You have uncontrolled bleeding.         [facility] [place]:    [phone] [facility] (7 days a week)

## 2020-11-19 NOTE — PROGRESS NOTES
Pls call Brayden and ask him to continue metoprolol tartrate 25 mg BID (skipped this AM's dose d/t cardioversion) and DECREASE amiodarone to 200 mg daily if he hasn't already done so (was to load 400 mg BID x 1 week, then 200 mg daily starting today).    THANK YOU! I've update Epic med list    Keep follow-up with me as planned.     Crystal

## 2020-11-19 NOTE — PROGRESS NOTES
Message left for patient to discuss recommendation per TOBI Rivas.  Awaiting return call.  PRABHA Palomo

## 2020-11-19 NOTE — PROCEDURES
[facility]    Procedure: Cardioversion External    Date/Time: 11/19/2020 10:11 AM  Performed by: Rocky Marinelli MD  Authorized by: Rocky Marinelli MD     UNIVERSAL PROTOCOL   Site Marked: No  Prior Images Obtained and Reviewed:  Yes  Required items: Required blood products, implants, devices and special equipment available    Patient identity confirmed:  Verbally with patient and arm band  Patient was reevaluated immediately before administering moderate or deep sedation or anesthesia  Confirmation Checklist:  Patient's identity using two indicators, relevant allergies, procedure was appropriate and matched the consent or emergent situation and correct equipment/implants were available  Time out: Immediately prior to the procedure a time out was called    Universal Protocol: the Joint Commission Universal Protocol was followed    Preparation: Patient was prepped and draped in usual sterile fashion           ANESTHESIA  Anesthesia was administered and monitored by anesthesiology.  See anesthesia documentation for details.    SEDATION    Patient Sedated: Yes    Sedation Type:  Deep  Sedation:  Propofol  Vital signs: Vital signs monitored during sedation      PROCEDURE DETAILS  Cardioversion basis: elective  Indications: failure of anti-arrhythmic medications  Pre-procedure rhythm: atrial fibrillation  Patient position: patient was placed in a supine position  Chest area: chest area exposed  Electrodes: pads  Electrodes placed: anterior-posterior  Number of attempts: 1    Details of Attempts:  Successful cardioversion with 200 J synchronized, biphasic with return of SR  Post-procedure rhythm: normal sinus rhythm  Complications: no complications

## 2020-11-19 NOTE — PROGRESS NOTES
Sedation Post Procedure Summary:    Immediately prior to starting the procedure a Time Out was conducted with procedural staff and re-confirmed the patient s name, procedure, and site/side. Md completed sedation assessment.     Consent obtained from patient after discussing the risks, benefits and alternatives.       Indication:  Sedation was required to allow for DC cardioversion    Sedatives: Propofol per anesthesia    Vital signs, airway, and pulse oximetry were monitored throughout the procedure and sedation was maintained until the procedure was complete.      Patient tolerance: Patient tolerated the procedure well with no immediate complications.      (See Doc Flow-sheets and MAR for additional information)    Tom Corbett RN

## 2020-11-19 NOTE — PROGRESS NOTES
Care Suites Discharge Summary    Discharge Criteria:   Discharge Criteria met per MD orders: Yes.   Vital signs stable.     Pt demonstrates ability to ambulate safely: Yes.  (See discharge questionnaire for additional information)    Discharge instructions & education:   Discharge instructions reviewed with patient. Patient verbalizes  understanding.   Additional patient education provided:  Cardioversion    Medications:   Patient will be discharging on new medications- No. Patient verbalizes reason for use, start date, and side effects NA.    Items returned to patient:   Home and hospital acquired medications returned to patient NA   Listed belongings gathered and returned to patient: Yes    Patient discharged to home with spouse.    Tom Corbett, RN

## 2020-11-19 NOTE — ANESTHESIA CARE TRANSFER NOTE
Patient: Mike Schultz    Procedure(s):  ANESTHESIA, FOR CARDIOVERSION (dr campbell)    Diagnosis: Atrial fibrillation, unspecified type (H) [I48.91]  Diagnosis Additional Information: No value filed.    Anesthesia Type:   No value filed.     Note:  Airway :Nasal Cannula  Patient transferred to:Telemetry/Step Down Unit  Handoff Report: Identifed the Patient, Identified the Reponsible Provider, Reviewed the pertinent medical history, Discussed the surgical course, Reviewed Intra-OP anesthesia mangement and issues during anesthesia, Set expectations for post-procedure period and Allowed opportunity for questions and acknowledgement of understanding      Vitals: (Last set prior to Anesthesia Care Transfer)              Electronically Signed By: AYAN Manzanares CRNA  November 19, 2020  10:14 AM

## 2020-11-24 LAB
INTERPRETATION ECG - MUSE: NORMAL
INTERPRETATION ECG - MUSE: NORMAL

## 2020-11-27 ENCOUNTER — MYC MEDICAL ADVICE (OUTPATIENT)
Dept: INTERNAL MEDICINE | Facility: CLINIC | Age: 70
End: 2020-11-27

## 2020-11-27 DIAGNOSIS — M25.649 MORNING JOINT STIFFNESS OF HAND, UNSPECIFIED LATERALITY: Primary | ICD-10-CM

## 2020-11-27 NOTE — TELEPHONE ENCOUNTER
Please see [employer] message 1 of 2 regarding hand stiffness and swelling.     Please advise of patients next steps.    Asia ERAZON, RN, PHN

## 2020-12-03 ENCOUNTER — MYC MEDICAL ADVICE (OUTPATIENT)
Dept: INTERNAL MEDICINE | Facility: CLINIC | Age: 70
End: 2020-12-03

## 2020-12-03 DIAGNOSIS — L98.9 SKIN LESION: Primary | ICD-10-CM

## 2020-12-07 DIAGNOSIS — I10 ESSENTIAL HYPERTENSION: ICD-10-CM

## 2020-12-07 DIAGNOSIS — I48.19 PERSISTENT ATRIAL FIBRILLATION (H): ICD-10-CM

## 2020-12-07 DIAGNOSIS — E78.5 DYSLIPIDEMIA: ICD-10-CM

## 2020-12-07 LAB
ALT SERPL W P-5'-P-CCNC: 32 U/L (ref 0–70)
ANION GAP SERPL CALCULATED.3IONS-SCNC: 3 MMOL/L (ref 3–14)
BUN SERPL-MCNC: 22 MG/DL (ref 7–30)
CALCIUM SERPL-MCNC: 8.6 MG/DL (ref 8.5–10.1)
CHLORIDE SERPL-SCNC: 109 MMOL/L (ref 94–109)
CHOLEST SERPL-MCNC: 173 MG/DL
CO2 SERPL-SCNC: 29 MMOL/L (ref 20–32)
CREAT SERPL-MCNC: 1.16 MG/DL (ref 0.66–1.25)
GFR SERPL CREATININE-BSD FRML MDRD: 64 ML/MIN/{1.73_M2}
GLUCOSE SERPL-MCNC: 90 MG/DL (ref 70–99)
HDLC SERPL-MCNC: 39 MG/DL
LDLC SERPL CALC-MCNC: 114 MG/DL
NONHDLC SERPL-MCNC: 134 MG/DL
POTASSIUM SERPL-SCNC: 3.8 MMOL/L (ref 3.4–5.3)
SODIUM SERPL-SCNC: 141 MMOL/L (ref 133–144)
TRIGL SERPL-MCNC: 102 MG/DL

## 2020-12-07 PROCEDURE — 84460 ALANINE AMINO (ALT) (SGPT): CPT | Performed by: PHYSICIAN ASSISTANT

## 2020-12-07 PROCEDURE — 80048 BASIC METABOLIC PNL TOTAL CA: CPT | Performed by: PHYSICIAN ASSISTANT

## 2020-12-07 PROCEDURE — 80061 LIPID PANEL: CPT | Performed by: PHYSICIAN ASSISTANT

## 2020-12-07 PROCEDURE — 36415 COLL VENOUS BLD VENIPUNCTURE: CPT | Performed by: PHYSICIAN ASSISTANT

## 2020-12-09 NOTE — PROGRESS NOTES
HPI:   I had the pleasure of seeing Brayden when he came for follow up of recent DCCV.  This 70 year old sees Dr. Day for his history of:    1. AFib dx'd in 2012. Failed flecainide (and noted scar on MRI). Underwent PVI and SVC isolation 11/2017. Repeat PVI and CTI for inducible AFlutter 4/2019. Now back in AFib after increasing alcohol intake. Started on Amio 11/11 with DCCV 11/19/2020.   2. Preserved EF based on echo 10/2017 and EF 51% on cMRI 10/2017  3. Benign Essential HTN   4. BOWEN  5. Myocardiac Scar noted on cardiac MRI 10/2017, consistent with prior MI in the left Cx territory. EF 51%.  Avoiding flecainide use  6. H/o Alcohol dependence, had not had EtOH use since 4/2019, but increased intake recently 10/2020  7. Chronic AC  On Xarelto for CHADSVASc 2 (HTN, age)    I had a virtual visit with Brayden 11/2020 to review his upcoming DCCV for recurrent AFib. He'd been started on an amiodarone load (400 mg BID x 1 week starting 11/11, then 200 mg daily starting 11/26).     DCCV was done 11/19 and successfully converted him back to SR. Amiodarone decreased to 200 mg daily and metoprolol tartrate 25 mg BID continued.     He contacted us via [employer] 11/23 as HRs had sometimes dropped into the 40s while awake.     He later sent a message to Dr. Kay noting his bilateral hand stiffness was still bothering him and he was referred to Rheumatology.     Interval History:  Has been placed on prednisone for the bilateral hand stiffness, which has helped. He sees Rheumatology soon.    HRs 40-60s with exercise. Isn't bothered by the low HRs but wonders if it should be so low. No c/o CP, SOB, No edema, orthopnea, PND.      Has realized that he really doesn't respond well to EtOH and has again vowed to stay off of this.      Recent Diagnostic Testing:  EKG today, which I overread, showed SB 50 bpm with 1st degree AV Block. Nonspecific STTW changes  Echocardiogram 3/2019 showed EF of 55-60%.  No regional wall motion abnormalities  were noted.  He had 1+ mitral regurgitation and 1+ tricuspid regurgitation.  RV was normal in structure, function and size.  Aortic valve showed just trace to mild aortic regurgitation with aortic sclerosis.  Mean gradient was 4.6 mmHg (improved from previous) volume index is 46.7 mL/m  with LA dimension of 4.9 cm.  Cardiac MRI 10/2017 showed an EF of 51% with mild hypokinesis involving the basal inferolateral segment.  Evidence of previous myocardial infarction in the circumflex territory was noted.    Assessment & Plan:    1. Recurrent AFib, now s/p DCCV on amiodarone 11/19/2020    Thought he'd gone back into AFib 11/5 due to increased EtOH. Started on Amiodarone load and continued on Xarelto    Amiodarone decreased to 200 mg daily a little early d/t low HR after cardioversion. HR remains low    PLAN:    Continue to avoid EtOH    Decrease metoprolol tartrate to 12.5 mg BID. Sent new Rx for 25 mg tabs in (currently cutting 25 mg tabs in 1/2)    Continue amiodarone    See Dr. Day 1 month with EKG. Explained that we'd likely not want him to stay on long term Amio given age    2. Dyslipidemia    Reviewed lipids. LDL not nearly as well controlled, which he thinks is d/t increased portion size    His trigs look really good after cutting out EtOH x 1 month    PLAN:    Limit portion sizes    No changes. He has no known CAD and is not on statin therapy. We have seen great improvement in his lipids with dietary changes so will start again with that.       Crystal Campos PA-C, MSPAS      Orders Placed This Encounter   Procedures     Follow-Up with Electrophysiologist     EKG 12-lead complete w/read - Clinics (performed today)     EKG 12-lead complete w/read - Clinics (to be scheduled)     Orders Placed This Encounter   Medications     metoprolol tartrate (LOPRESSOR) 25 MG tablet     Sig: Take 0.5 tablets (12.5 mg) by mouth 2 times daily     Dispense:  60 tablet     Refill:  5     New dose     Medications Discontinued During  This Encounter   Medication Reason     metoprolol tartrate (LOPRESSOR) 50 MG tablet Reorder         Encounter Diagnoses   Name Primary?     Essential hypertension Yes     Persistent atrial fibrillation (H)      Dyslipidemia        CURRENT MEDICATIONS:  Current Outpatient Medications   Medication Sig Dispense Refill     allopurinol (ZYLOPRIM) 300 MG tablet Take 1 tablet (300 mg) by mouth daily 90 tablet 3     amiodarone (PACERONE) 200 MG tablet Take 1 tablet (200 mg) by mouth daily       amLODIPine (NORVASC) 2.5 MG tablet Take 1 tablet (2.5 mg) by mouth daily 90 tablet 3     cetirizine (ZYRTEC) 10 MG tablet Take 10 mg by mouth as needed for allergies       finasteride (PROPECIA) 1 MG tablet Take 1 tablet (1 mg) by mouth daily 90 tablet 3     furosemide (LASIX) 20 MG tablet Take 1 tablet (20 mg) by mouth daily       losartan (COZAAR) 100 MG tablet Take 1 tablet (100 mg) by mouth daily 90 tablet 1     metoprolol tartrate (LOPRESSOR) 25 MG tablet Take 0.5 tablets (12.5 mg) by mouth 2 times daily 60 tablet 5     order for DME Equipment ordered: RESMED CPAP Mask type: Full face  Settings: 15       potassium chloride ER (K-TAB/KLOR-CON) 10 MEQ CR tablet Take 2 tablets (20 mEq) by mouth daily 30 tablet 11     predniSONE (DELTASONE) 5 MG tablet Take 1 tablet (5 mg) by mouth daily 30 tablet 0     XARELTO ANTICOAGULANT 20 MG TABS tablet Take 1 tablet by mouth daily with dinner. Avoid use of alcohol while using medication. 90 tablet 0       ALLERGIES     Allergies   Allergen Reactions     No Known Drug Allergies        PAST MEDICAL HISTORY:  Past Medical History:   Diagnosis Date     H/O ETOH abuse      Herpes simplex without mention of complication      Hyperlipidemia      Obesity      Persistent atrial fibrillation (H)     persistent, DCCV 12/2017, ablation 11/6/17     Tobacco use disorder     dced 6/2004       PAST SURGICAL HISTORY:  Past Surgical History:   Procedure Laterality Date     ANESTHESIA CARDIOVERSION N/A  2019    Procedure: ANESTHESIA CARDIOVERSION (CONNER);  Surgeon: GENERIC ANESTHESIA PROVIDER;  Location:  OR     ANESTHESIA CARDIOVERSION N/A 2020    Procedure: ANESTHESIA, FOR CARDIOVERSION (dr campbell);  Surgeon: GENERIC ANESTHESIA PROVIDER;  Location:  OR     C TOTAL KNEE ARTHROPLASTY Left 2018    Dr. Malick Suarez     EP ABLATION FOCAL AFIB N/A 2019    Procedure: EP Ablation Focal AFIB;  Surgeon: Fady Day MD;  Location:  [facility]     HERNIORRHAPHY UMBILICAL  2012    Procedure: HERNIORRHAPHY UMBILICAL;  UMBILICAL HERNIA REPAIR;  Surgeon: Ra Crocker MD;  Location:  [facility]     ORTHOPEDIC SURGERY       [facility] NONSPECIFIC PROCEDURE      achilles tendon     [facility] NONSPECIFIC PROCEDURE      knees, arthroscopy     [facility] NONSPECIFIC PROCEDURE      basal cell skin ca, nose     Z NONSPECIFIC PROCEDURE      flex sig; colonoscopy due 3/2008     [facility] NONSPECIFIC PROCEDURE      cardioversion for atr fib       FAMILY HISTORY:  Family History   Problem Relation Age of Onset     Family History Negative Mother      Heart Disease Father         decesased at 42 - MI     Heart Disease Sister          MI     Heart Disease Brother          MI     Heart Disease Brother         CABG AT 49     Lipids Sister      Lipids Sister      Lipids Sister      Family History Negative Brother        SOCIAL HISTORY:  Social History     Socioeconomic History     Marital status: Single     Spouse name: None     Number of children: None     Years of education: None     Highest education level: None   Occupational History     None   Social Needs     Financial resource strain: None     Food insecurity     Worry: None     Inability: None     Transportation needs     Medical: None     Non-medical: None   Tobacco Use     Smoking status: Former Smoker     Packs/day: 1.00     Years: 10.00     Pack years: 10.00     Types: Cigarettes     Start date:      Quit date: 2011     Years  "since quittin.3     Smokeless tobacco: Never Used   Substance and Sexual Activity     Alcohol use: No     Frequency: Never     Drug use: No     Sexual activity: Yes     Partners: Female   Lifestyle     Physical activity     Days per week: None     Minutes per session: None     Stress: None   Relationships     Social connections     Talks on phone: None     Gets together: None     Attends Faith service: None     Active member of club or organization: None     Attends meetings of clubs or organizations: None     Relationship status: None     Intimate partner violence     Fear of current or ex partner: None     Emotionally abused: None     Physically abused: None     Forced sexual activity: None   Other Topics Concern     Parent/sibling w/ CABG, MI or angioplasty before 65F 55M? Not Asked   Social History Narrative     None       Review of Systems:  Skin:  Negative     Eyes:  Positive for glasses(reading only)  ENT:  Negative    Respiratory:  Positive for CPAP;sleep apnea  Cardiovascular:  Negative for;palpitations;chest pain;dizziness;lightheadedness;fatigue;edema    Gastroenterology: Negative for melena;hematochezia  Genitourinary:  Positive for urinary frequency  Musculoskeletal:  Negative    Neurologic:  Negative    Psychiatric:  Positive for excessive alcohol consumption  Heme/Lymph/Imm:  Negative    Endocrine:  Negative      Physical Exam:  Vitals: /66   Pulse 52   Ht 1.702 m (5' 7\")   Wt 127.4 kg (280 lb 14.4 oz)   BMI 44.00 kg/m      Constitutional:  cooperative, alert and oriented, well developed, well nourished, in no acute distress obese      Skin:  warm and dry to the touch, no apparent skin lesions or masses noted        Head:  normocephalic, no masses or lesions        Eyes:  pupils equal and round;conjunctivae and lids unremarkable;sclera white        ENT:  no pallor or cyanosis, dentition good        Neck:  JVP normal;no carotid bruit        Chest:  normal breath sounds, clear to " auscultation, normal A-P diameter, normal symmetry, normal respiratory excursion, no use of accessory muscles        Cardiac: regular rhythm bradycardic                Abdomen:  abdomen soft obese      Vascular: pulses full and equal                                      Extremities and Back:           Neurological:  no gross motor deficits        Recent Lab Results:  LIPID RESULTS:  Lab Results   Component Value Date    CHOL 173 12/07/2020    HDL 39 (L) 12/07/2020     (H) 12/07/2020    TRIG 102 12/07/2020    CHOLHDLRATIO 3.8 03/16/2015       LIVER ENZYME RESULTS:  Lab Results   Component Value Date    AST 20 09/21/2020    ALT 32 12/07/2020       CBC RESULTS:  Lab Results   Component Value Date    WBC 9.8 09/21/2020    RBC 4.47 09/21/2020    HGB 14.4 09/21/2020    HCT 44.7 09/21/2020     09/21/2020    MCH 32.2 09/21/2020    MCHC 32.2 09/21/2020    RDW 13.3 09/21/2020     09/21/2020       BMP RESULTS:  Lab Results   Component Value Date     12/07/2020    POTASSIUM 3.8 12/07/2020    CHLORIDE 109 12/07/2020    CO2 29 12/07/2020    ANIONGAP 3 12/07/2020    GLC 90 12/07/2020    BUN 22 12/07/2020    CR 1.16 12/07/2020    GFRESTIMATED 64 12/07/2020    GFRESTBLACK 74 12/07/2020    ASHELY 8.6 12/07/2020

## 2020-12-10 ENCOUNTER — OFFICE VISIT (OUTPATIENT)
Dept: CARDIOLOGY | Facility: CLINIC | Age: 70
End: 2020-12-10
Attending: PHYSICIAN ASSISTANT
Payer: COMMERCIAL

## 2020-12-10 VITALS
SYSTOLIC BLOOD PRESSURE: 120 MMHG | HEIGHT: 67 IN | HEART RATE: 52 BPM | BODY MASS INDEX: 44.09 KG/M2 | DIASTOLIC BLOOD PRESSURE: 66 MMHG | WEIGHT: 280.9 LBS

## 2020-12-10 DIAGNOSIS — E78.5 DYSLIPIDEMIA: ICD-10-CM

## 2020-12-10 DIAGNOSIS — I10 ESSENTIAL HYPERTENSION: Primary | ICD-10-CM

## 2020-12-10 DIAGNOSIS — I48.19 PERSISTENT ATRIAL FIBRILLATION (H): ICD-10-CM

## 2020-12-10 PROCEDURE — 99214 OFFICE O/P EST MOD 30 MIN: CPT | Performed by: PHYSICIAN ASSISTANT

## 2020-12-10 PROCEDURE — 93000 ELECTROCARDIOGRAM COMPLETE: CPT | Performed by: PHYSICIAN ASSISTANT

## 2020-12-10 RX ORDER — METOPROLOL TARTRATE 25 MG/1
12.5 TABLET, FILM COATED ORAL 2 TIMES DAILY
Qty: 60 TABLET | Refills: 5 | Status: SHIPPED | OUTPATIENT
Start: 2020-12-10 | End: 2021-01-07

## 2020-12-10 ASSESSMENT — MIFFLIN-ST. JEOR: SCORE: 1997.78

## 2020-12-10 NOTE — PATIENT INSTRUCTIONS
Brayden - it was nice to see you today!    1. Your EKG confirmed normal rhythm - still pretty slow  2. You're doing well and exercising    PLAN:  1. Continue to avoid alcohol - your heart does NOT like alcohol!  2. Reduce the metoprolol 25 mg twice a day down to 12.5 mg twice a day (1/2 of the new Rx 25 mg tabs twice a day). This should increase the heart rate a bit  3. Weight lifting!  Work the butt, thighs and back for better calorie burning. Continue aerobic exercise as well!!  4. Continue amiodarone 200 mg daily and the Xarelto    5. See Day in ~1 month

## 2020-12-10 NOTE — LETTER
12/10/2020    Johnny Kay MD  [address] [facility] [address]    RE: Mike Schultz       Dear Colleague,    I had the pleasure of seeing Mike Schultz in the [facility].    HPI:   I had the pleasure of seeing Brayden when he came for follow up of recent DCCV.  This 70 year old sees Dr. Day for his history of:    1. AFib dx'd in 2012. Failed flecainide (and noted scar on MRI). Underwent PVI and SVC isolation 11/2017. Repeat PVI and CTI for inducible AFlutter 4/2019. Now back in AFib after increasing alcohol intake. Started on Amio 11/11 with DCCV 11/19/2020.   2. Preserved EF based on echo 10/2017 and EF 51% on cMRI 10/2017  3. Benign Essential HTN   4. BOWEN  5. Myocardiac Scar noted on cardiac MRI 10/2017, consistent with prior MI in the left Cx territory. EF 51%.  Avoiding flecainide use  6. H/o Alcohol dependence, had not had EtOH use since 4/2019, but increased intake recently 10/2020  7. Chronic AC  On Xarelto for CHADSVASc 2 (HTN, age)    I had a virtual visit with Brayden 11/2020 to review his upcoming DCCV for recurrent AFib. He'd been started on an amiodarone load (400 mg BID x 1 week starting 11/11, then 200 mg daily starting 11/26).     DCCV was done 11/19 and successfully converted him back to SR. Amiodarone decreased to 200 mg daily and metoprolol tartrate 25 mg BID continued.     He contacted us via [employer] 11/23 as HRs had sometimes dropped into the 40s while awake.     He later sent a message to Dr. Kay noting his bilateral hand stiffness was still bothering him and he was referred to Rheumatology.     Interval History:  Has been placed on prednisone for the bilateral hand stiffness, which has helped. He sees Rheumatology soon.    HRs 40-60s with exercise. Isn't bothered by the low HRs but wonders if it should be so low. No c/o CP, SOB, No edema, orthopnea, PND.      Has realized that he really doesn't respond well to EtOH and has again vowed to stay off of  this.      Recent Diagnostic Testing:  EKG today, which I overread, showed SB 50 bpm with 1st degree AV Block. Nonspecific STTW changes  Echocardiogram 3/2019 showed EF of 55-60%.  No regional wall motion abnormalities were noted.  He had 1+ mitral regurgitation and 1+ tricuspid regurgitation.  RV was normal in structure, function and size.  Aortic valve showed just trace to mild aortic regurgitation with aortic sclerosis.  Mean gradient was 4.6 mmHg (improved from previous) volume index is 46.7 mL/m  with LA dimension of 4.9 cm.  Cardiac MRI 10/2017 showed an EF of 51% with mild hypokinesis involving the basal inferolateral segment.  Evidence of previous myocardial infarction in the circumflex territory was noted.    Assessment & Plan:    1. Recurrent AFib, now s/p DCCV on amiodarone 11/19/2020    Thought he'd gone back into AFib 11/5 due to increased EtOH. Started on Amiodarone load and continued on Xarelto    Amiodarone decreased to 200 mg daily a little early d/t low HR after cardioversion. HR remains low    PLAN:    Continue to avoid EtOH    Decrease metoprolol tartrate to 12.5 mg BID. Sent new Rx for 25 mg tabs in (currently cutting 25 mg tabs in 1/2)    Continue amiodarone    See Dr. Day 1 month with EKG. Explained that we'd likely not want him to stay on long term Amio given age    2. Dyslipidemia    Reviewed lipids. LDL not nearly as well controlled, which he thinks is d/t increased portion size    His trigs look really good after cutting out EtOH x 1 month    PLAN:    Limit portion sizes    No changes. He has no known CAD and is not on statin therapy. We have seen great improvement in his lipids with dietary changes so will start again with that.       Crystal Campos PA-C, MSPAS      Orders Placed This Encounter   Procedures     Follow-Up with Electrophysiologist     EKG 12-lead complete w/read - Clinics (performed today)     EKG 12-lead complete w/read - Clinics (to be scheduled)     Orders Placed This  Encounter   Medications     metoprolol tartrate (LOPRESSOR) 25 MG tablet     Sig: Take 0.5 tablets (12.5 mg) by mouth 2 times daily     Dispense:  60 tablet     Refill:  5     New dose     Medications Discontinued During This Encounter   Medication Reason     metoprolol tartrate (LOPRESSOR) 50 MG tablet Reorder         Encounter Diagnoses   Name Primary?     Essential hypertension Yes     Persistent atrial fibrillation (H)      Dyslipidemia        CURRENT MEDICATIONS:  Current Outpatient Medications   Medication Sig Dispense Refill     allopurinol (ZYLOPRIM) 300 MG tablet Take 1 tablet (300 mg) by mouth daily 90 tablet 3     amiodarone (PACERONE) 200 MG tablet Take 1 tablet (200 mg) by mouth daily       amLODIPine (NORVASC) 2.5 MG tablet Take 1 tablet (2.5 mg) by mouth daily 90 tablet 3     cetirizine (ZYRTEC) 10 MG tablet Take 10 mg by mouth as needed for allergies       finasteride (PROPECIA) 1 MG tablet Take 1 tablet (1 mg) by mouth daily 90 tablet 3     furosemide (LASIX) 20 MG tablet Take 1 tablet (20 mg) by mouth daily       losartan (COZAAR) 100 MG tablet Take 1 tablet (100 mg) by mouth daily 90 tablet 1     metoprolol tartrate (LOPRESSOR) 25 MG tablet Take 0.5 tablets (12.5 mg) by mouth 2 times daily 60 tablet 5     order for DME Equipment ordered: RESMED CPAP Mask type: Full face  Settings: 15       potassium chloride ER (K-TAB/KLOR-CON) 10 MEQ CR tablet Take 2 tablets (20 mEq) by mouth daily 30 tablet 11     predniSONE (DELTASONE) 5 MG tablet Take 1 tablet (5 mg) by mouth daily 30 tablet 0     XARELTO ANTICOAGULANT 20 MG TABS tablet Take 1 tablet by mouth daily with dinner. Avoid use of alcohol while using medication. 90 tablet 0       ALLERGIES     Allergies   Allergen Reactions     No Known Drug Allergies        PAST MEDICAL HISTORY:  Past Medical History:   Diagnosis Date     H/O ETOH abuse      Herpes simplex without mention of complication      Hyperlipidemia      Obesity      Persistent atrial  fibrillation (H)     persistent, DCCV 2017, ablation 17     Tobacco use disorder     dced 2004       PAST SURGICAL HISTORY:  Past Surgical History:   Procedure Laterality Date     ANESTHESIA CARDIOVERSION N/A 2019    Procedure: ANESTHESIA CARDIOVERSION (CONNER);  Surgeon: GENERIC ANESTHESIA PROVIDER;  Location:  OR     ANESTHESIA CARDIOVERSION N/A 2020    Procedure: ANESTHESIA, FOR CARDIOVERSION (dr campbell);  Surgeon: GENERIC ANESTHESIA PROVIDER;  Location:  OR     C TOTAL KNEE ARTHROPLASTY Left 2018    Dr. Malick Suarez     EP ABLATION FOCAL AFIB N/A 2019    Procedure: EP Ablation Focal AFIB;  Surgeon: Fady Day MD;  Location:  HEART CARDIAC CATH LAB     HERNIORRHAPHY UMBILICAL  2012    Procedure: HERNIORRHAPHY UMBILICAL;  UMBILICAL HERNIA REPAIR;  Surgeon: Ra Crocker MD;  Location:  SD     ORTHOPEDIC SURGERY       Z NONSPECIFIC PROCEDURE      achilles tendon     ZZ NONSPECIFIC PROCEDURE      knees, arthroscopy     ZZ NONSPECIFIC PROCEDURE      basal cell skin ca, nose     ZZ NONSPECIFIC PROCEDURE      flex sig; colonoscopy due 3/2008     Z NONSPECIFIC PROCEDURE  2012    cardioversion for atr fib       FAMILY HISTORY:  Family History   Problem Relation Age of Onset     Family History Negative Mother      Heart Disease Father         decesased at 42 - MI     Heart Disease Sister          MI     Heart Disease Brother          MI     Heart Disease Brother         CABG AT 49     Lipids Sister      Lipids Sister      Lipids Sister      Family History Negative Brother        SOCIAL HISTORY:  Social History     Socioeconomic History     Marital status: Single     Spouse name: None     Number of children: None     Years of education: None     Highest education level: None   Occupational History     None   Social Needs     Financial resource strain: None     Food insecurity     Worry: None     Inability: None     Transportation needs      "Medical: None     Non-medical: None   Tobacco Use     Smoking status: Former Smoker     Packs/day: 1.00     Years: 10.00     Pack years: 10.00     Types: Cigarettes     Start date:      Quit date: 2011     Years since quittin.3     Smokeless tobacco: Never Used   Substance and Sexual Activity     Alcohol use: No     Frequency: Never     Drug use: No     Sexual activity: Yes     Partners: Female   Lifestyle     Physical activity     Days per week: None     Minutes per session: None     Stress: None   Relationships     Social connections     Talks on phone: None     Gets together: None     Attends [employer] witness service: None     Active member of club or organization: None     Attends meetings of clubs or organizations: None     Relationship status: None     Intimate partner violence     Fear of current or ex partner: None     Emotionally abused: None     Physically abused: None     Forced sexual activity: None   Other Topics Concern     Parent/sibling w/ CABG, MI or angioplasty before 65F 55M? Not Asked   Social History Narrative     None       Review of Systems:  Skin:  Negative     Eyes:  Positive for glasses(reading only)  ENT:  Negative    Respiratory:  Positive for CPAP;sleep apnea  Cardiovascular:  Negative for;palpitations;chest pain;dizziness;lightheadedness;fatigue;edema    Gastroenterology: Negative for melena;hematochezia  Genitourinary:  Positive for urinary frequency  Musculoskeletal:  Negative    Neurologic:  Negative    Psychiatric:  Positive for excessive alcohol consumption  Heme/Lymph/Imm:  Negative    Endocrine:  Negative      Physical Exam:  Vitals: /66   Pulse 52   Ht 1.702 m (5' 7\")   Wt 127.4 kg (280 lb 14.4 oz)   BMI 44.00 kg/m      Constitutional:  cooperative, alert and oriented, well developed, well nourished, in no acute distress obese      Skin:  warm and dry to the touch, no apparent skin lesions or masses noted        Head:  normocephalic, no masses or lesions    "     Eyes:  pupils equal and round;conjunctivae and lids unremarkable;sclera white        ENT:  no pallor or cyanosis, dentition good        Neck:  JVP normal;no carotid bruit        Chest:  normal breath sounds, clear to auscultation, normal A-P diameter, normal symmetry, normal respiratory excursion, no use of accessory muscles        Cardiac: regular rhythm bradycardic                Abdomen:  abdomen soft obese      Vascular: pulses full and equal                                      Extremities and Back:           Neurological:  no gross motor deficits        Recent Lab Results:  LIPID RESULTS:  Lab Results   Component Value Date    CHOL 173 12/07/2020    HDL 39 (L) 12/07/2020     (H) 12/07/2020    TRIG 102 12/07/2020    CHOLHDLRATIO 3.8 03/16/2015       LIVER ENZYME RESULTS:  Lab Results   Component Value Date    AST 20 09/21/2020    ALT 32 12/07/2020       CBC RESULTS:  Lab Results   Component Value Date    WBC 9.8 09/21/2020    RBC 4.47 09/21/2020    HGB 14.4 09/21/2020    HCT 44.7 09/21/2020     09/21/2020    MCH 32.2 09/21/2020    MCHC 32.2 09/21/2020    RDW 13.3 09/21/2020     09/21/2020       BMP RESULTS:  Lab Results   Component Value Date     12/07/2020    POTASSIUM 3.8 12/07/2020    CHLORIDE 109 12/07/2020    CO2 29 12/07/2020    ANIONGAP 3 12/07/2020    GLC 90 12/07/2020    BUN 22 12/07/2020    CR 1.16 12/07/2020    GFRESTIMATED 64 12/07/2020    GFRESTBLACK 74 12/07/2020    ASHELY 8.6 12/07/2020        Thank you for allowing me to participate in the care of your patient.    Sincerely,     Natalie Campos PA-C     [facility]

## 2020-12-12 DIAGNOSIS — I48.19 PERSISTENT ATRIAL FIBRILLATION (H): ICD-10-CM

## 2020-12-14 NOTE — TELEPHONE ENCOUNTER
Xarelto    Routing refill request to provider for review/approval because:  Labs out of range:  [facility] Clearance    Asia ERAZON, RN, PHN

## 2020-12-16 RX ORDER — RIVAROXABAN 20 MG/1
TABLET, FILM COATED ORAL
Qty: 90 TABLET | Refills: 1 | Status: SHIPPED | OUTPATIENT
Start: 2020-12-16 | End: 2021-06-02

## 2020-12-31 ENCOUNTER — VIRTUAL VISIT (OUTPATIENT)
Dept: RHEUMATOLOGY | Facility: CLINIC | Age: 70
End: 2020-12-31
Attending: INTERNAL MEDICINE
Payer: COMMERCIAL

## 2020-12-31 DIAGNOSIS — M25.649 MORNING JOINT STIFFNESS OF HAND, UNSPECIFIED LATERALITY: ICD-10-CM

## 2020-12-31 PROCEDURE — 99244 OFF/OP CNSLTJ NEW/EST MOD 40: CPT | Mod: 95 | Performed by: STUDENT IN AN ORGANIZED HEALTH CARE EDUCATION/TRAINING PROGRAM

## 2020-12-31 NOTE — PROGRESS NOTES
"Mike Schultz is a 70 year old male who is being evaluated via a billable video visit.      The patient has been notified of following:     \"This video visit will be conducted via a call between you and your physician/provider. We have found that certain health care needs can be provided without the need for an in-person physical exam.  This service lets us provide the care you need with a video conversation.  If a prescription is necessary we can send it directly to your pharmacy.  If lab work is needed we can place an order for that and you can then stop by our lab to have the test done at a later time.    Video visits are billed at different rates depending on your insurance coverage.  Please reach out to your insurance provider with any questions.    If during the course of the call the physician/provider feels a video visit is not appropriate, you will not be charged for this service.\"    Patient has given verbal consent for Video visit? Yes  How would you like to obtain your AVS? MyChart  If you are dropped from the video visit, the video invite should be resent to: Text to cell phone: [phone]  Will anyone else be joining your video visit? No      Venessa Whitfield CMA               "

## 2020-12-31 NOTE — PATIENT INSTRUCTIONS
Blood tests ordered     Symmetric inflammation in your hands does raise concern about inflammatory arthritis like RA.    Since you are off of prednisone I will not restart it right now.  You can take Tylenol arthritis on as-needed basis until further testing is done.    If blood test all came back normal then will consider getting MRI of your left hand to look for synovitis    Follow-up in 4 weeks. Feb 10th

## 2020-12-31 NOTE — PROGRESS NOTES
"Mike Schultz is a 70 year old male who is being evaluated via a billable video visit.      The patient has been notified of following:     \"This video visit will be conducted via a call between you and your physician/provider. We have found that certain health care needs can be provided without the need for an in-person physical exam.  This service lets us provide the care you need with a video conversation.  If a prescription is necessary we can send it directly to your pharmacy.  If lab work is needed we can place an order for that and you can then stop by our lab to have the test done at a later time.    Video visits are billed at different rates depending on your insurance coverage.  Please reach out to your insurance provider with any questions.    If during the course of the call the physician/provider feels a video visit is not appropriate, you will not be charged for this service.\"    Patient has given verbal consent for Video visit? Yes  How would you like to obtain your AVS? MyChart  If you are dropped from the video visit, the video invite should be resent to: Text to cell phone: [phone]  Will anyone else be joining your video visit? No               Active Problem List:     Patient Active Problem List    Diagnosis Date Noted     Bilateral carpal tunnel syndrome 10/21/2020     Priority: Medium     Persistent atrial fibrillation (H) 02/26/2019     Priority: Medium     Added automatically from request for surgery [id number]       Venous insufficiency 03/02/2018     Priority: Medium     Aortic valve stenosis 10/18/2017     Priority: Medium     Impaired fasting glucose 02/03/2017     Priority: Medium     Ascending aorta dilatation (H) 12/23/2016     Priority: Medium     Aortic root dilatation       BOWEN (obstructive sleep apnea) 09/30/2016     Priority: Medium     Split Night:  Sleep Study 9/20/2016 - (274.0 lbs) AHI 74.1, RDI 74.1, Supine AHI 79.1, REM AHI 43.6, Low O2% 61.6%, Time Spent ?88% 106.8, Time " Spent ?89% 129.4, CPAP was titrated to a pressure of 15 with an AHI 15.6. Time spent in REM supine at this pressure was - minutes.       Advanced directives, counseling/discussion 01/05/2015     Priority: Medium     Advance Care Planning:   ACP Review and Resources Provided:  Reviewed chart for advance care plan.  Mike Schultz has no plan or code status on file. Discussed available resources and provided with information. Confirmed code status reflects current choices pending further ACP discussions.  Confirmed/documented designated decision maker(s). See permanent comments section of demographics in clinical tab.   Added by Shanthi Osei on 1/5/2015       Family history of ischemic heart disease 01/05/2015     Priority: Medium     Father MI 42, brother CABG mid 50's  Patient with perfusion study 7/2012       Gout 01/05/2015     Priority: Medium     Many episodes in right foot.   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  Often related to alcohol intake       Alcoholism (H)      Priority: Medium     Prior heavy abuse.  Went through treatments in past  [facility] 2016  Using 5 - 6 shots of vodka  per day as of 9/16/2016.   Stopped drinking fully 1/1/2017         Atrial fibrillation, permanent (H)      Priority: Medium     Cardiologist Dr. Yates  DCCV 12/2017, ablation 11/6/17  Echo 12/2014 - LVH, borderline LV size, CHARBEL, 1-2+ MR, borderline prox aorta size       Tobacco use disorder, moderate, in sustained remission      Priority: Medium     dced 6/2004       HYPERLIPIDEMIA LDL GOAL <130 10/31/2010     Priority: Medium     Morbid obesity      Priority: Medium     Herpes simplex virus (HSV) infection      Priority: Medium     Problem list name updated by automated process. Provider to review       Allergic rhinitis 05/12/2005     Priority: Medium     Problem list name updated by automated process. Provider to review       Essential hypertension 04/12/2005     Priority: Medium     Impotence of organic origin 04/12/2005     Priority: Medium             History of Present Illness:   Mike Schultz is a 70 year old male with PMH of obesity, obstructive sleep apnea, hypertension, A. fib, gout, bilateral carpal tunnel syndrome aortic valve stenosis, venous insufficiency evaluated via a billable virtual visit in consultation at request of Dr Cummins for evaluation of joint pains.     He reports that his symptoms started in October 2020 and gradually worsened.  He had pain in his hands, wrists, shoulders.  Denies any pain in his feet, ankles, knees or lower back.  He had trouble opening and closing his hands.  Pain is worse in the morning.  He was seen by hand specialist and was given prednisone x 5 days.  He was given long prednisone taper for 3 to 4 weeks by PCP on 12/3/2020.  He is off of prednisone now for 5 days and it seems like his pain is coming back.  He also sees chiropractor who does gel and heat massage on his hands and arms which helps.    He has pain in his right index finger.  Before the prednisone taper he had pain in his wrists, left hand is worse than the right.  He had numbness in his fingers. Left shoulder pain was bad too.  After the prednisone taper symptoms have improved.     He exercise every morning and night for about 20 minutes.  He also reports history of gout and is on allopurinol.  His last gout episode was couple years ago in his big toe.  Denies any alcohol intake or smoking.    He has history of A. fib and had cardioversion and 2 ablations.     He had blood test in October which showed elevated CRP of 27, sed rate-18, normal CK, negative rheumatoid factor, anti-CCP.  X-ray of bilateral hands did not show any evidence of inflammatory arthritis.    No history of psoriasis, ulcerative colitis or chron's disease. No h/o iritis, enthesitis, finger or toe swelling like dactylitis, plantar fascitis or heel pain. Denies any raynauds, malar rash, photosensitivity, recurrent mouth/genital ulcers, sicca symptoms, pleuritic chest pains,  recurrent sinusitis/rhinitis, swallowing difficulty, hearing or visual changes recently.  No h/o arterial/venous thrombosis in the past. Denies any tick bite, recent GI/ infection.             Review of Systems:     Review Of Systems  Constitutional: denies fever, chills, night sweats and weight loss.  Skin: No skin rash.  Eyes: No dryness or irritation in eyes. No episode of eye inflammation or redness.   Ears/Nose/Throat: no recurrent sinus infections.  Respiratory: No shortness of breath, dyspnea on exertion, cough, or hemoptysis  Cardiovascular: no chest pain or palpitations.  Gastrointestinal: no nausea, vomiting, abdominal pain.  Normal bowel movements.  Genitourinary: no dysuria, frequency  or hematuria.  Musculoskeletal: as in HPI  Neurologic: no numbness, tingling.  Psychiatric: no mood disorders.  Hematologic/Lymphatic/Immunologic: no history of easy bruising, petechia or purpura.  No abnormal bleeding.   Endocrine: no h/o thyroid disease or Diabetes.                  Past Medical History:     Past Medical History:   Diagnosis Date     H/O ETOH abuse      Herpes simplex without mention of complication      Hyperlipidemia      Obesity      Persistent atrial fibrillation (H)     persistent, DCCV 12/2017, ablation 11/6/17     Tobacco use disorder     dced 6/2004     Past Surgical History:   Procedure Laterality Date     ANESTHESIA CARDIOVERSION N/A 2/26/2019    Procedure: ANESTHESIA CARDIOVERSION ([facility]);  Surgeon: GENERIC ANESTHESIA PROVIDER;  Location:  OR     ANESTHESIA CARDIOVERSION N/A 11/19/2020    Procedure: ANESTHESIA, FOR CARDIOVERSION (dr campbell);  Surgeon: GENERIC ANESTHESIA PROVIDER;  Location:  OR     C TOTAL KNEE ARTHROPLASTY Left 03/16/2018    Dr. Malick Suarez     EP ABLATION FOCAL AFIB N/A 4/12/2019    Procedure: EP Ablation Focal AFIB;  Surgeon: Fady Day MD;  Location:  [facility]ART [facility] CATH LAB     HERNIORRHAPHY UMBILICAL  8/20/2012    Procedure: HERNIORRHAPHY UMBILICAL;  UMBILICAL  HERNIA REPAIR;  Surgeon: Ra Crocker MD;  Location: [facility]     ORTHOPEDIC SURGERY       [facility] NONSPECIFIC PROCEDURE      achilles tendon     [facility] NONSPECIFIC PROCEDURE      knees, arthroscopy     [facility] NONSPECIFIC PROCEDURE      basal cell skin ca, nose     [facility] NONSPECIFIC PROCEDURE      flex sig; colonoscopy due 3/2008     [facility] NONSPECIFIC PROCEDURE      cardioversion for atr fib            Social History:     Social History     Occupational History     Not on file   Tobacco Use     Smoking status: Former Smoker     Packs/day: 1.00     Years: 10.00     Pack years: 10.00     Types: Cigarettes     Start date:      Quit date: 2011     Years since quittin.3     Smokeless tobacco: Never Used   Substance and Sexual Activity     Alcohol use: No     Frequency: Never     Drug use: No     Sexual activity: Yes     Partners: Female            Family History:     Family History   Problem Relation Age of Onset     Family History Negative Mother      Heart Disease Father         decesased at 42 - MI     Heart Disease Sister          MI     Heart Disease Brother          MI     Heart Disease Brother         CABG AT 49     Lipids Sister      Lipids Sister      Lipids Sister      Family History Negative Brother             Allergies:     Allergies   Allergen Reactions     No Known Drug Allergies             Medications:     Current Outpatient Medications   Medication Sig Dispense Refill     allopurinol (ZYLOPRIM) 300 MG tablet Take 1 tablet (300 mg) by mouth daily 90 tablet 3     amiodarone (PACERONE) 200 MG tablet Take 1 tablet (200 mg) by mouth daily       amLODIPine (NORVASC) 2.5 MG tablet Take 1 tablet (2.5 mg) by mouth daily 90 tablet 3     cetirizine (ZYRTEC) 10 MG tablet Take 10 mg by mouth as needed for allergies       finasteride (PROPECIA) 1 MG tablet Take 1 tablet (1 mg) by mouth daily 90 tablet 3     furosemide (LASIX) 20 MG tablet Take 1 tablet (20 mg) by mouth daily       losartan  (COZAAR) 100 MG tablet Take 1 tablet (100 mg) by mouth daily 90 tablet 1     metoprolol tartrate (LOPRESSOR) 25 MG tablet Take 0.5 tablets (12.5 mg) by mouth 2 times daily 60 tablet 5     order for DME Equipment ordered: RESMED CPAP Mask type: Full face  Settings: 15       potassium chloride ER (K-TAB/KLOR-CON) 10 MEQ CR tablet Take 2 tablets (20 mEq) by mouth daily 30 tablet 11     predniSONE (DELTASONE) 5 MG tablet Take 1 tablet (5 mg) by mouth daily 30 tablet 0     XARELTO ANTICOAGULANT 20 MG TABS tablet Take 1 tablet by mouth daily with dinner. Avoid use of alcohol while using medication. 90 tablet 1            Physical Exam:   Vitals not taken.   Wt Readings from Last 4 Encounters:   12/10/20 127.4 kg (280 lb 14.4 oz)   11/19/20 128.7 kg (283 lb 11.2 oz)   10/15/20 124.3 kg (274 lb)   10/06/20 124.5 kg (274 lb 8 oz)       Constitutional: Morbidly obese, appearing stated age; cooperative  MS: Reports tenderness of bilateral wrists, MCP, PIP joint.  Left hand worse than the right.  No tenderness reported over MTP joints, bilateral ankles.  No knee effusions.  Psych: nl judgement, orientation, memory, affect.         Data:     No results found for any visits on 12/31/20.    Recent Labs   Lab Test 12/07/20  0904 10/06/20  1055 09/21/20  1605 08/14/20  0808 01/03/20  0749 01/03/20  0749 04/12/19  0707 03/12/19  1337 03/12/19  1337 11/06/17  1159 11/06/17  1159 03/16/15  0927 03/16/15  0927   WBC  --   --  9.8  --   --   --  7.1  --   --   --  10.5   < >  --    RBC  --   --  4.47  --   --   --  4.63  --   --   --  4.55   < >  --    HGB  --   --  14.4  --   --   --  16.4  --   --   --  16.2   < >  --    HCT  --   --  44.7  --   --   --  48.2  --   --   --  46.9   < >  --    MCV  --   --  100  --   --   --  104*  --   --   --  103*   < >  --    RDW  --   --  13.3  --   --   --  13.2  --   --   --  13.3   < >  --    PLT  --   --  280  --   --   --  193  --   --   --  190   < >  --    ALBUMIN  --   --  3.2*  --   --  3.6   --   --  3.7  --   --    < >  --    CRP  --  27.0*  --   --   --   --   --   --   --   --   --   --  <2.9   BUN 22  --  18 21   < > 27 23   < > 21   < > 20   < >  --     < > = values in this interval not displayed.      Recent Labs   Lab Test 09/21/20  1605 01/03/20  0749 10/12/17  0814   TSH 0.82 2.16 3.80     Hemoglobin   Date Value Ref Range Status   09/21/2020 14.4 13.3 - 17.7 g/dL Final   04/12/2019 16.4 13.3 - 17.7 g/dL Final   11/06/2017 16.2 13.3 - 17.7 g/dL Final     Urea Nitrogen   Date Value Ref Range Status   12/07/2020 22 7 - 30 mg/dL Final   09/21/2020 18 7 - 30 mg/dL Final   08/14/2020 21 7 - 30 mg/dL Final     Sed Rate   Date Value Ref Range Status   10/06/2020 18 0 - 20 mm/h Final     CRP-Ultrasensitive   Date Value Ref Range Status   08/31/2004 0.08 mg/dL Final     Comment:     Male Reference Range     25th Percentile = 0.03  mg/dL     50th Percentile = 0.07  mg/dL     75th Percentile = 0.14  mg/dL     90th Percentile = 0.29  mg/dL     95th Percentile = 0.49  mg/dL     CRP Inflammation   Date Value Ref Range Status   10/06/2020 27.0 (H) 0.0 - 8.0 mg/L Final   03/16/2015 <2.9 0.0 - 8.0 mg/L Final     AST   Date Value Ref Range Status   09/21/2020 20 0 - 45 U/L Final   01/03/2020 19 0 - 45 U/L Final   03/12/2019 22 0 - 45 U/L Final     Albumin   Date Value Ref Range Status   09/21/2020 3.2 (L) 3.4 - 5.0 g/dL Final   01/03/2020 3.6 3.4 - 5.0 g/dL Final   03/12/2019 3.7 3.4 - 5.0 g/dL Final     Alkaline Phosphatase   Date Value Ref Range Status   09/21/2020 92 40 - 150 U/L Final   01/03/2020 101 40 - 150 U/L Final   03/12/2019 82 40 - 150 U/L Final     ALT   Date Value Ref Range Status   12/07/2020 32 0 - 70 U/L Final   09/21/2020 28 0 - 70 U/L Final   08/14/2020 6 5 - 30 U/L Final     Rheumatoid Factor   Date Value Ref Range Status   10/07/2020 <7 <12 IU/mL Final     Recent Labs   Lab Test 12/07/20  0904 09/21/20  1605 08/14/20  0808 01/03/20  0749 01/03/20  0749 04/12/19  0707 03/12/19  1337  03/12/19  1337 11/06/17  1159 11/06/17  1159 10/12/17  0814 10/12/17  0814   WBC  --  9.8  --   --   --  7.1  --   --   --  10.5  --   --    HGB  --  14.4  --   --   --  16.4  --   --   --  16.2  --   --    HCT  --  44.7  --   --   --  48.2  --   --   --  46.9  --   --    MCV  --  100  --   --   --  104*  --   --   --  103*  --   --    PLT  --  280  --   --   --  193  --   --   --  190  --   --    BUN 22 18 21   < > 27 23   < > 21   < > 20   < > 22   TSH  --  0.82  --   --  2.16  --   --   --   --   --   --  3.80   AST  --  20  --   --  19  --   --  22  --   --   --  23   ALT 32 28 6   < > 35  --   --  40  --   --   --  40   ALKPHOS  --  92  --   --  101  --   --  82  --   --   --  83    < > = values in this interval not displayed.       Other studies:   Component      Latest Ref Rng & Units 10/6/2020 10/7/2020   CRP Inflammation      0.0 - 8.0 mg/L 27.0 (H)    Sed Rate      0 - 20 mm/h 18    CK Total      30 - 300 U/L 80    Cyclic Citrullinated Peptide Antibody, IgG      <7 U/mL  1   Rheumatoid Factor      <12 IU/mL  <7       Reviewed Rheumatology lab flowsheet    Assessment     Polyarticular symmetric arthralgia  Bilateral carpal tunnel syndrome  Morbid obesity, hypertension, BOWEN  Elevated CRP  Negative rheumatoid factor, anti-CCP    Polyarticular symmetric arthralgia: Sudden onset of symmetric polyarthralgia involving bilateral hands, wrists, shoulders with elevated CRP raise concern about inflammatory process.  His joint pains are prednisone responsive.  His rheumatoid serologies are negative. Denies history of psoriasis, IBD, uveitis.  His symptoms could be related to seronegative inflammatory arthritis.  He needs further evaluation.  We will get hepatitis B/C and TB test in anticipation of starting him on DMARD medication.  I will repeat his inflammatory markers and rheumatoid factor.    If his blood test comes back normal then I will consider getting MRI of his hand to look for synovitis.    He denies  Raynaud's, photosensitivity, skin rash, pleurisy, shortness of breath with decreases likelihood of DAVID associated autoimmune connective tissue disease.    Plan     Blood tests ordered     Symmetric inflammation in your hands does raise concern about inflammatory arthritis like RA.    Since you are off of prednisone I will not restart it right now.  You can take Tylenol arthritis on as-needed basis until further testing is done.    If blood test all came back normal then will consider getting MRI of your left hand to look for synovitis    Follow-up in 4 weeks. Feb 10th        Video-Visit Details    Type of service:  Video Visit    Video Start Time: 8:40 AM   Video End Time: 9:20 AM     Originating Location (pt. Location): Home    Distant Location (provider location):  [facility]     Platform used for Video Visit: Connie Koenig MD

## 2021-01-06 NOTE — PROGRESS NOTES
Discharge Note -Hand Therapy    Initial Evaluation Date:  10/21/2020  Current Date: 1/6/2021  Number of Visits: 1    S:    Pt did not follow up for scheduled visits.    O:  Objective information is not available as pt has not returned for therapy.  Last visit or last objective information will serve as final entry.      A: Pt did not return for further treatment.    Status of goals is unknown due to lack of followup of patient.      P:  Discharge from Hand Therapy.

## 2021-01-07 ENCOUNTER — OFFICE VISIT (OUTPATIENT)
Dept: CARDIOLOGY | Facility: CLINIC | Age: 71
End: 2021-01-07
Attending: PHYSICIAN ASSISTANT
Payer: COMMERCIAL

## 2021-01-07 VITALS
HEIGHT: 68 IN | HEART RATE: 54 BPM | OXYGEN SATURATION: 96 % | WEIGHT: 278.7 LBS | BODY MASS INDEX: 42.24 KG/M2 | SYSTOLIC BLOOD PRESSURE: 135 MMHG | DIASTOLIC BLOOD PRESSURE: 78 MMHG

## 2021-01-07 DIAGNOSIS — I48.19 PERSISTENT ATRIAL FIBRILLATION (H): ICD-10-CM

## 2021-01-07 PROCEDURE — 99214 OFFICE O/P EST MOD 30 MIN: CPT | Performed by: INTERNAL MEDICINE

## 2021-01-07 RX ORDER — AMIODARONE HYDROCHLORIDE 200 MG/1
100 TABLET ORAL DAILY
Qty: 90 TABLET | Refills: 3 | COMMUNITY
Start: 2021-01-07 | End: 2021-07-01

## 2021-01-07 ASSESSMENT — MIFFLIN-ST. JEOR: SCORE: 2003.67

## 2021-01-07 NOTE — PROGRESS NOTES
HPI and Plan:   See dictation  [id number]  Orders Placed This Encounter   Procedures     Follow-Up with Cardiac Advanced Practice Provider       Orders Placed This Encounter   Medications     amiodarone (PACERONE) 200 MG tablet     Sig: Take 0.5 tablets (100 mg) by mouth daily     Dispense:  90 tablet     Refill:  3       Medications Discontinued During This Encounter   Medication Reason     amiodarone (PACERONE) 200 MG tablet      metoprolol tartrate (LOPRESSOR) 25 MG tablet          Encounter Diagnosis   Name Primary?     Persistent atrial fibrillation (H)        CURRENT MEDICATIONS:  Current Outpatient Medications   Medication Sig Dispense Refill     allopurinol (ZYLOPRIM) 300 MG tablet Take 1 tablet (300 mg) by mouth daily 90 tablet 3     amiodarone (PACERONE) 200 MG tablet Take 0.5 tablets (100 mg) by mouth daily 90 tablet 3     amLODIPine (NORVASC) 2.5 MG tablet Take 1 tablet (2.5 mg) by mouth daily 90 tablet 3     cetirizine (ZYRTEC) 10 MG tablet Take 10 mg by mouth as needed for allergies       finasteride (PROPECIA) 1 MG tablet Take 1 tablet (1 mg) by mouth daily 90 tablet 3     furosemide (LASIX) 20 MG tablet Take 1 tablet (20 mg) by mouth daily       losartan (COZAAR) 100 MG tablet Take 1 tablet (100 mg) by mouth daily 90 tablet 1     order for DME Equipment ordered: RESMED CPAP Mask type: Full face  Settings: 15       potassium chloride ER (K-TAB/KLOR-CON) 10 MEQ CR tablet Take 2 tablets (20 mEq) by mouth daily 30 tablet 11     predniSONE (DELTASONE) 5 MG tablet Take 1 tablet (5 mg) by mouth daily 30 tablet 0     XARELTO ANTICOAGULANT 20 MG TABS tablet Take 1 tablet by mouth daily with dinner. Avoid use of alcohol while using medication. 90 tablet 1       ALLERGIES     Allergies   Allergen Reactions     No Known Drug Allergies        PAST MEDICAL HISTORY:  Past Medical History:   Diagnosis Date     H/O ETOH abuse      Herpes simplex without mention of complication      Hyperlipidemia      Obesity       Persistent atrial fibrillation (H)     persistent, DCCV 2017, ablation 17     Tobacco use disorder     dced 2004       PAST SURGICAL HISTORY:  Past Surgical History:   Procedure Laterality Date     ANESTHESIA CARDIOVERSION N/A 2019    Procedure: ANESTHESIA CARDIOVERSION ([facility]);  Surgeon: GENERIC ANESTHESIA PROVIDER;  Location:  OR     ANESTHESIA CARDIOVERSION N/A 2020    Procedure: ANESTHESIA, FOR CARDIOVERSION (dr campbell);  Surgeon: GENERIC ANESTHESIA PROVIDER;  Location:  OR     C TOTAL KNEE ARTHROPLASTY Left 2018    Dr. Malick Suarez     EP ABLATION FOCAL AFIB N/A 2019    Procedure: EP Ablation Focal AFIB;  Surgeon: Fady Day MD;  Location:  HEART [facility]ARDIAC CATH LAB     HERNIORRHAPHY UMBILICAL  2012    Procedure: HERNIORRHAPHY UMBILICAL;  UMBILICAL HERNIA REPAIR;  Surgeon: Ra Crocker MD;  Location:  [facility]     ORTHOPEDIC SURGERY       Z NONSPECIFIC PROCEDURE      achilles tendon     ZZ NONSPECIFIC PROCEDURE      knees, arthroscopy     ZZ NONSPECIFIC PROCEDURE      basal cell skin ca, nose     ZZ NONSPECIFIC PROCEDURE      flex sig; colonoscopy due 3/2008     Z NONSPECIFIC PROCEDURE  2012    cardioversion for atr fib       FAMILY HISTORY:  Family History   Problem Relation Age of Onset     Family History Negative Mother      Heart Disease Father         decesased at 42 - MI     Heart Disease Sister          MI     Heart Disease Brother          MI     Heart Disease Brother         CABG AT 49     Lipids Sister      Lipids Sister      Lipids Sister      Family History Negative Brother        SOCIAL HISTORY:  Social History     Socioeconomic History     Marital status: Single     Spouse name: None     Number of children: None     Years of education: None     Highest education level: None   Occupational History     None   Social Needs     Financial resource strain: None     Food insecurity     Worry: None     Inability: None      "Transportation needs     Medical: None     Non-medical: None   Tobacco Use     Smoking status: Former Smoker     Packs/day: 1.00     Years: 10.00     Pack years: 10.00     Types: Cigarettes     Start date:      Quit date: 2011     Years since quittin.3     Smokeless tobacco: Never Used   Substance and Sexual Activity     Alcohol use: No     Frequency: Never     Drug use: No     Sexual activity: Yes     Partners: Female   Lifestyle     Physical activity     Days per week: None     Minutes per session: None     Stress: None   Relationships     Social connections     Talks on phone: None     Gets together: None     Attends Sikh service: None     Active member of club or organization: None     Attends meetings of clubs or organizations: None     Relationship status: None     Intimate partner violence     Fear of current or ex partner: None     Emotionally abused: None     Physically abused: None     Forced sexual activity: None   Other Topics Concern     Parent/sibling w/ CABG, MI or angioplasty before 65F 55M? Not Asked   Social History Narrative     None       Review of Systems:  Skin:  Negative       Eyes:  Positive for glasses(reading only)    ENT:  Negative      Respiratory:  Positive for CPAP;sleep apnea     Cardiovascular:  Negative for;palpitations;chest pain;dizziness;lightheadedness;fatigue;edema      Gastroenterology: Negative for melena;hematochezia    Genitourinary:  Positive for urinary frequency    Musculoskeletal:  Positive for joint pain    Neurologic:  Positive for numbness or tingling of hands    Psychiatric:  Positive for excessive alcohol consumption currently off EtOH since 2019  Heme/Lymph/Imm:  Negative      Endocrine:  Negative        Physical Exam:  Vitals: /78 (BP Location: Left arm, Patient Position: Sitting, Cuff Size: Adult Large)   Pulse 54   Ht 1.727 m (5' 8\")   Wt 126.4 kg (278 lb 11.2 oz)   SpO2 96%   BMI 42.38 kg/m      Constitutional:  cooperative, alert " and oriented, well developed, well nourished, in no acute distress overweight      Skin:  warm and dry to the touch, no apparent skin lesions or masses noted          Head:  normocephalic, no masses or lesions        Eyes:           Lymph:      ENT:  no pallor or cyanosis, dentition good        Neck:  carotid pulses are full and equal bilaterally, JVP normal, no carotid bruit        Respiratory:  normal breath sounds, clear to auscultation, normal A-P diameter, normal symmetry, normal respiratory excursion, no use of accessory muscles         Cardiac: regular rhythm bradycardic              pulses full and equal, no bruits auscultated                                        GI:  abdomen soft, non-tender, BS normoactive, no mass, no HSM, no bruits obese      Extremities and Muscular Skeletal:  no deformities, clubbing, cyanosis, erythema observed              Neurological:  no gross motor deficits        Psych:  Alert and Oriented x 3        CC  Natalie Campos PA-C  [id number]  [place] [id number]

## 2021-01-07 NOTE — LETTER
1/7/2021      Johnny Kay MD  [address] [facility] [address]      RE: Mike Schultz       Dear Colleague,    I had the pleasure of seeing Mike BRAXTON Schultz in the [facility].    Service Date: 01/07/2021      HISTORY OF PRESENT ILLNESS:  Thank you for allowing me to participate in the care of this very delightful patient.  As you know, Brayden is a 70-year-old gentleman with a history of recurrent persistent atrial fibrillation and was on flecainide at one point for rhythm control strategy, but it was stopped because it was ineffective.  The patient was then switched to rate control strategy by my partner, but the patient started having more symptoms with atrial fibrillation and therefore asked me for a second opinion.  I recommended a catheter-based ablation, which was performed 11/2017.  The patient presented in atrial fibrillation at that time, and I elected to isolate the pulmonary veins and SVC only given at that time data suggested minimal if any benefits in isolation of left atrial posterior wall in patients with persistent atrial fibrillation.      The patient was doing well until a few years ago, when he was noted to have more atrial fibrillation and required cardioversion.  I recommended another ablation, which was performed in 04/2019.  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  I re-isolated all of these veins and also ablated the induced right-sided atrial flutter.  Again I did not isolate the left atrial posterior wall or do any linear ablation, as the culprit for recurrent atrial fibrillation was likely reconnected pulmonary vein potentials.      Again, Brayden was doing well until this past November when he started to drink again.  The patient does have a history of alcoholism and tends to minimize his intake.  He also gained some weight over the past few years as well.  He gained about at least 13 pounds from a year ago.       We felt that the trigger was a combination of 2 factors I mentioned, and I did have the patient on amiodarone prior to cardioversion.  He has been in sinus tom in the 50s since then.  His metoprolol was reduced recently because of that.      Since then, Brayden is doing quite well.  He is again motivated to be total alcohol abstinence and also motivated to lose weight.  He does get up in the middle of the night for some snack, which I told him is a no-no.  The best diet now seems to be intermittent fasting and exercise.  For now, I would like to reduce his amiodarone to 100 mg once a day along with stopping his metoprolol.  The patient will continue with his anticoagulation for now, and I will have him come back to see Crystal, one of our advanced practice providers who has seen the patient before, in about 6 months' time.  If he is doing really well at that time, we may consider stopping his amiodarone.  I did caution Brayden that, unfortunately, despite lifestyle modifications including alcohol abstinence and weight reduction, AFib can still recur.  If that is the case, we may consider having him go back on amiodarone versus a repeat ablation.      cc:   Johnny Kay MD    [address]  [address]         KIKE PRIETO MD             D: 2021   T: 2021   MT: HEIDI      Name:     TOO HOWARD   MRN:      [id number]        Account:      [id number]   :      1950           Service Date: 2021      Document: [id number]            Outpatient Encounter Medications as of 2021   Medication Sig Dispense Refill     allopurinol (ZYLOPRIM) 300 MG tablet Take 1 tablet (300 mg) by mouth daily 90 tablet 3     amiodarone (PACERONE) 200 MG tablet Take 0.5 tablets (100 mg) by mouth daily 90 tablet 3     amLODIPine (NORVASC) 2.5 MG tablet Take 1 tablet (2.5 mg) by mouth daily 90 tablet 3     cetirizine (ZYRTEC) 10 MG tablet Take 10 mg by mouth as needed for  allergies       finasteride (PROPECIA) 1 MG tablet Take 1 tablet (1 mg) by mouth daily 90 tablet 3     furosemide (LASIX) 20 MG tablet Take 1 tablet (20 mg) by mouth daily       losartan (COZAAR) 100 MG tablet Take 1 tablet (100 mg) by mouth daily 90 tablet 1     order for DME Equipment ordered: RESMED CPAP Mask type: Full face  Settings: 15       potassium chloride ER (K-TAB/KLOR-CON) 10 MEQ CR tablet Take 2 tablets (20 mEq) by mouth daily 30 tablet 11     predniSONE (DELTASONE) 5 MG tablet Take 1 tablet (5 mg) by mouth daily 30 tablet 0     XARELTO ANTICOAGULANT 20 MG TABS tablet Take 1 tablet by mouth daily with dinner. Avoid use of alcohol while using medication. 90 tablet 1     [DISCONTINUED] amiodarone (PACERONE) 200 MG tablet Take 1 tablet (200 mg) by mouth daily       [DISCONTINUED] metoprolol tartrate (LOPRESSOR) 25 MG tablet Take 0.5 tablets (12.5 mg) by mouth 2 times daily 60 tablet 5     No facility-administered encounter medications on file as of 1/7/2021.        Again, thank you for allowing me to participate in the care of your patient.      Sincerely,    Fady Kent MD     [facility]

## 2021-01-07 NOTE — LETTER
1/7/2021    Johnny Kay MD  [address] [facility] [address]    RE: Mike Schultz       Dear Colleague,    I had the pleasure of seeing Mike Schultz in the [facility].    HPI and Plan:   See dictation  [id number]  Orders Placed This Encounter   Procedures     Follow-Up with Cardiac Advanced Practice Provider       Orders Placed This Encounter   Medications     amiodarone (PACERONE) 200 MG tablet     Sig: Take 0.5 tablets (100 mg) by mouth daily     Dispense:  90 tablet     Refill:  3       Medications Discontinued During This Encounter   Medication Reason     amiodarone (PACERONE) 200 MG tablet      metoprolol tartrate (LOPRESSOR) 25 MG tablet          Encounter Diagnosis   Name Primary?     Persistent atrial fibrillation (H)        CURRENT MEDICATIONS:  Current Outpatient Medications   Medication Sig Dispense Refill     allopurinol (ZYLOPRIM) 300 MG tablet Take 1 tablet (300 mg) by mouth daily 90 tablet 3     amiodarone (PACERONE) 200 MG tablet Take 0.5 tablets (100 mg) by mouth daily 90 tablet 3     amLODIPine (NORVASC) 2.5 MG tablet Take 1 tablet (2.5 mg) by mouth daily 90 tablet 3     cetirizine (ZYRTEC) 10 MG tablet Take 10 mg by mouth as needed for allergies       finasteride (PROPECIA) 1 MG tablet Take 1 tablet (1 mg) by mouth daily 90 tablet 3     furosemide (LASIX) 20 MG tablet Take 1 tablet (20 mg) by mouth daily       losartan (COZAAR) 100 MG tablet Take 1 tablet (100 mg) by mouth daily 90 tablet 1     order for DME Equipment ordered: RESMED CPAP Mask type: Full face  Settings: 15       potassium chloride ER (K-TAB/KLOR-CON) 10 MEQ CR tablet Take 2 tablets (20 mEq) by mouth daily 30 tablet 11     predniSONE (DELTASONE) 5 MG tablet Take 1 tablet (5 mg) by mouth daily 30 tablet 0     XARELTO ANTICOAGULANT 20 MG TABS tablet Take 1 tablet by mouth daily with dinner. Avoid use of alcohol while using medication. 90 tablet 1       ALLERGIES     Allergies   Allergen  Reactions     No Known Drug Allergies        PAST MEDICAL HISTORY:  Past Medical History:   Diagnosis Date     H/O ETOH abuse      Herpes simplex without mention of complication      Hyperlipidemia      Obesity      Persistent atrial fibrillation (H)     persistent, DCCV 2017, ablation 17     Tobacco use disorder     dced 2004       PAST SURGICAL HISTORY:  Past Surgical History:   Procedure Laterality Date     ANESTHESIA CARDIOVERSION N/A 2019    Procedure: ANESTHESIA CARDIOVERSION (CONNER);  Surgeon: GENERIC ANESTHESIA PROVIDER;  Location:  OR     ANESTHESIA CARDIOVERSION N/A 2020    Procedure: ANESTHESIA, FOR CARDIOVERSION (dr campbell);  Surgeon: GENERIC ANESTHESIA PROVIDER;  Location:  OR     C TOTAL KNEE ARTHROPLASTY Left 2018    Dr. Malick Suarez     EP ABLATION FOCAL AFIB N/A 2019    Procedure: EP Ablation Focal AFIB;  Surgeon: Fady Day MD;  Location:  HEART CARDIAC CATH LAB     HERNIORRHAPHY UMBILICAL  2012    Procedure: HERNIORRHAPHY UMBILICAL;  UMBILICAL HERNIA REPAIR;  Surgeon: Ra Crocker MD;  Location:  SD     ORTHOPEDIC SURGERY       Z NONSPECIFIC PROCEDURE      achilles tendon     Z NONSPECIFIC PROCEDURE      knees, arthroscopy     Z NONSPECIFIC PROCEDURE      basal cell skin ca, nose     ZZ NONSPECIFIC PROCEDURE      flex sig; colonoscopy due 3/2008     Z NONSPECIFIC PROCEDURE      cardioversion for atr fib       FAMILY HISTORY:  Family History   Problem Relation Age of Onset     Family History Negative Mother      Heart Disease Father         decesased at 42 - MI     Heart Disease Sister          MI     Heart Disease Brother          MI     Heart Disease Brother         CABG AT 49     Lipids Sister      Lipids Sister      Lipids Sister      Family History Negative Brother        SOCIAL HISTORY:  Social History     Socioeconomic History     Marital status: Single     Spouse name: None     Number of children: None      Years of education: None     Highest education level: None   Occupational History     None   Social Needs     Financial resource strain: None     Food insecurity     Worry: None     Inability: None     Transportation needs     Medical: None     Non-medical: None   Tobacco Use     Smoking status: Former Smoker     Packs/day: 1.00     Years: 10.00     Pack years: 10.00     Types: Cigarettes     Start date:      Quit date: 2011     Years since quittin.3     Smokeless tobacco: Never Used   Substance and Sexual Activity     Alcohol use: No     Frequency: Never     Drug use: No     Sexual activity: Yes     Partners: Female   Lifestyle     Physical activity     Days per week: None     Minutes per session: None     Stress: None   Relationships     Social connections     Talks on phone: None     Gets together: None     Attends [employer] service: None     Active member of club or organization: None     Attends meetings of clubs or organizations: None     Relationship status: None     Intimate partner violence     Fear of current or ex partner: None     Emotionally abused: None     Physically abused: None     Forced sexual activity: None   Other Topics Concern     Parent/sibling w/ CABG, MI or angioplasty before 65F 55M? Not Asked   Social History Narrative     None       Review of Systems:  Skin:  Negative       Eyes:  Positive for glasses(reading only)    ENT:  Negative      Respiratory:  Positive for CPAP;sleep apnea     Cardiovascular:  Negative for;palpitations;chest pain;dizziness;lightheadedness;fatigue;edema      Gastroenterology: Negative for melena;hematochezia    Genitourinary:  Positive for urinary frequency    Musculoskeletal:  Positive for joint pain    Neurologic:  Positive for numbness or tingling of hands    Psychiatric:  Positive for excessive alcohol consumption currently off EtOH since 2019  Heme/Lymph/Imm:  Negative      Endocrine:  Negative        Physical Exam:  Vitals: /78 (BP  "Location: Left arm, Patient Position: Sitting, Cuff Size: Adult Large)   Pulse 54   Ht 1.727 m (5' 8\")   Wt 126.4 kg (278 lb 11.2 oz)   SpO2 96%   BMI 42.38 kg/m      Constitutional:  cooperative, alert and oriented, well developed, well nourished, in no acute distress overweight      Skin:  warm and dry to the touch, no apparent skin lesions or masses noted          Head:  normocephalic, no masses or lesions        Eyes:           Lymph:      ENT:  no pallor or cyanosis, dentition good        Neck:  carotid pulses are full and equal bilaterally, JVP normal, no carotid bruit        Respiratory:  normal breath sounds, clear to auscultation, normal A-P diameter, normal symmetry, normal respiratory excursion, no use of accessory muscles         Cardiac: regular rhythm bradycardic              pulses full and equal, no bruits auscultated                                        GI:  abdomen soft, non-tender, BS normoactive, no mass, no HSM, no bruits obese      Extremities and Muscular Skeletal:  no deformities, clubbing, cyanosis, erythema observed              Neurological:  no gross motor deficits        Psych:  Alert and Oriented x 3        CC  Natalie Campos PA-C  [address]  [place] [address]                  Thank you for allowing me to participate in the care of your patient.      Sincerely,     Fady Kent MD     [facility]    cc:   Natalie Campos PA-C  [address]  [place] [address]        "

## 2021-01-08 DIAGNOSIS — M25.649 MORNING JOINT STIFFNESS OF HAND, UNSPECIFIED LATERALITY: ICD-10-CM

## 2021-01-08 LAB
CRP SERPL-MCNC: <2.9 MG/L (ref 0–8)
ERYTHROCYTE [SEDIMENTATION RATE] IN BLOOD BY WESTERGREN METHOD: 9 MM/H (ref 0–20)
HBV CORE AB SERPL QL IA: NONREACTIVE
HBV SURFACE AG SERPL QL IA: NONREACTIVE
HCV AB SERPL QL IA: NONREACTIVE

## 2021-01-08 PROCEDURE — 87340 HEPATITIS B SURFACE AG IA: CPT | Performed by: STUDENT IN AN ORGANIZED HEALTH CARE EDUCATION/TRAINING PROGRAM

## 2021-01-08 PROCEDURE — 86704 HEP B CORE ANTIBODY TOTAL: CPT | Performed by: STUDENT IN AN ORGANIZED HEALTH CARE EDUCATION/TRAINING PROGRAM

## 2021-01-08 PROCEDURE — 86803 HEPATITIS C AB TEST: CPT | Performed by: STUDENT IN AN ORGANIZED HEALTH CARE EDUCATION/TRAINING PROGRAM

## 2021-01-08 PROCEDURE — 85652 RBC SED RATE AUTOMATED: CPT | Performed by: STUDENT IN AN ORGANIZED HEALTH CARE EDUCATION/TRAINING PROGRAM

## 2021-01-08 PROCEDURE — 86140 C-REACTIVE PROTEIN: CPT | Performed by: STUDENT IN AN ORGANIZED HEALTH CARE EDUCATION/TRAINING PROGRAM

## 2021-01-08 PROCEDURE — 36415 COLL VENOUS BLD VENIPUNCTURE: CPT | Performed by: STUDENT IN AN ORGANIZED HEALTH CARE EDUCATION/TRAINING PROGRAM

## 2021-01-08 PROCEDURE — 86481 TB AG RESPONSE T-CELL SUSP: CPT | Performed by: STUDENT IN AN ORGANIZED HEALTH CARE EDUCATION/TRAINING PROGRAM

## 2021-01-08 PROCEDURE — 86431 RHEUMATOID FACTOR QUANT: CPT | Performed by: STUDENT IN AN ORGANIZED HEALTH CARE EDUCATION/TRAINING PROGRAM

## 2021-01-08 PROCEDURE — 86038 ANTINUCLEAR ANTIBODIES: CPT | Performed by: STUDENT IN AN ORGANIZED HEALTH CARE EDUCATION/TRAINING PROGRAM

## 2021-01-08 NOTE — PROGRESS NOTES
Service Date: 01/07/2021      HISTORY OF PRESENT ILLNESS:  Thank you for allowing me to participate in the care of this very delightful patient.  As you know, Brayden is a 70-year-old gentleman with a history of recurrent persistent atrial fibrillation and was on flecainide at one point for rhythm control strategy, but it was stopped because it was ineffective.  The patient was then switched to rate control strategy by my partner, but the patient started having more symptoms with atrial fibrillation and therefore asked me for a second opinion.  I recommended a catheter-based ablation, which was performed 11/2017.  The patient presented in atrial fibrillation at that time, and I elected to isolate the pulmonary veins and SVC only given at that time data suggested minimal if any benefits in isolation of left atrial posterior wall in patients with persistent atrial fibrillation.      The patient was doing well until a few years ago, when he was noted to have more atrial fibrillation and required cardioversion.  I recommended another ablation, which was performed in 04/2019.  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  I re-isolated all of these veins and also ablated the induced right-sided atrial flutter.  Again I did not isolate the left atrial posterior wall or do any linear ablation, as the culprit for recurrent atrial fibrillation was likely reconnected pulmonary vein potentials.      Again, Brayden was doing well until this past November when he started to drink again.  The patient does have a history of alcoholism and tends to minimize his intake.  He also gained some weight over the past few years as well.  He gained about at least 13 pounds from a year ago.      We felt that the trigger was a combination of 2 factors I mentioned, and I did have the patient on amiodarone prior to cardioversion.  He has been in sinus tom in the 50s since then.  His metoprolol was reduced  recently because of that.      Since then, Brayden is doing quite well.  He is again motivated to be total alcohol abstinence and also motivated to lose weight.  He does get up in the middle of the night for some snack, which I told him is a no-no.  The best diet now seems to be intermittent fasting and exercise.  For now, I would like to reduce his amiodarone to 100 mg once a day along with stopping his metoprolol.  The patient will continue with his anticoagulation for now, and I will have him come back to see Crystal, one of our advanced practice providers who has seen the patient before, in about 6 months' time.  If he is doing really well at that time, we may consider stopping his amiodarone.  I did caution Brayden that, unfortunately, despite lifestyle modifications including alcohol abstinence and weight reduction, AFib can still recur.  If that is the case, we may consider having him go back on amiodarone versus a repeat ablation.      cc:   Johnny Kay MD    [address]  [address]         KIKE PRIETO MD             D: 2021   T: 2021   MT: HEIDI      Name:     TOO HOWARD   MRN:      [id number]        Account:      [id number]   :      1950           Service Date: 2021      Document: [id number]

## 2021-01-11 ENCOUNTER — MYC MEDICAL ADVICE (OUTPATIENT)
Dept: RHEUMATOLOGY | Facility: CLINIC | Age: 71
End: 2021-01-11

## 2021-01-11 DIAGNOSIS — G89.29 CHRONIC HAND PAIN, UNSPECIFIED LATERALITY: Primary | ICD-10-CM

## 2021-01-11 DIAGNOSIS — M79.643 CHRONIC HAND PAIN, UNSPECIFIED LATERALITY: Primary | ICD-10-CM

## 2021-01-11 DIAGNOSIS — R79.82 ELEVATED HIGH SENSITIVITY C-REACTIVE PROTEIN: ICD-10-CM

## 2021-01-11 LAB
ANA SER QL IF: NEGATIVE
GAMMA INTERFERON BACKGROUND BLD IA-ACNC: 0.02 IU/ML
M TB IFN-G CD4+ BCKGRND COR BLD-ACNC: 9.98 IU/ML
M TB TUBERC IFN-G BLD QL: NEGATIVE
MITOGEN IGNF BCKGRD COR BLD-ACNC: 0 IU/ML
MITOGEN IGNF BCKGRD COR BLD-ACNC: 0 IU/ML
RHEUMATOID FACT SER NEPH-ACNC: <7 IU/ML (ref 0–20)

## 2021-01-20 NOTE — RESULT ENCOUNTER NOTE
Ramon Owen,    Your blood tests for autoimmune connective tissue diseases and Rheumatoid arthritis is negative. Your inflammation markers ESR, CRP have normalized which is most likely related to prednisone use. Your tests for TB, Hepatitis is negative. Having normal test results can not rule out Rheumatoid arthritis especially when you still have symptoms. X ray of hands was normal. We can get MRI of left hand to evaluate for inflammation in the joints. MRI can be more sensitive.     Thuan Koenig MD

## 2021-01-20 NOTE — TELEPHONE ENCOUNTER
Patient viewed results via tabulate.     CASTRO MachadoN, RN   Medical Specialty Care Coordinator   [facility]

## 2021-02-22 DIAGNOSIS — R60.0 PERIPHERAL EDEMA: ICD-10-CM

## 2021-02-22 RX ORDER — POTASSIUM CHLORIDE 750 MG/1
20 TABLET, EXTENDED RELEASE ORAL DAILY
Qty: 180 TABLET | Refills: 1 | Status: SHIPPED | OUTPATIENT
Start: 2021-02-22 | End: 2021-07-01

## 2021-04-24 DIAGNOSIS — I10 ESSENTIAL HYPERTENSION: ICD-10-CM

## 2021-04-26 RX ORDER — LOSARTAN POTASSIUM 100 MG/1
100 TABLET ORAL DAILY
Qty: 90 TABLET | Refills: 2 | Status: SHIPPED | OUTPATIENT
Start: 2021-04-26 | End: 2022-01-28

## 2021-06-01 DIAGNOSIS — I48.19 PERSISTENT ATRIAL FIBRILLATION (H): ICD-10-CM

## 2021-06-02 RX ORDER — RIVAROXABAN 20 MG/1
TABLET, FILM COATED ORAL
Qty: 90 TABLET | Refills: 0 | Status: SHIPPED | OUTPATIENT
Start: 2021-06-02 | End: 2021-07-01

## 2021-06-02 NOTE — TELEPHONE ENCOUNTER
Zestar Study Consent Visit    Study description: ECG and PPG Study: Zestar Study      Mike Schultz a 69 y.o. male , was contacted by today to discuss participation in the Zestar study.     The patient called the Clinical Trials Office to inquire about study participation.  The consent form was reviewed with the patient.     The review of the study included:    Study purpose     Conflict of interest    Device description      Study visits    Risks of participation    Benefits (if any)    Alternatives    Voluntary participation    Confidentiality     Compensation/costs of participation    Study stipends    Injury and legal rights    The subject was queried in regards to his willingness to continue and come in for scheduled appointment. his questions were answered to his satisfaction.   The patient has given his preliminary agreement to volunteer to participate in the above noted study.     Plan: Mike Schultz will come to [facility] on 10/14/2019 to continue consent process. If he continues to agrees to participate, the study visit will be done on the same day.    Jordana Sosa RN

## 2021-06-03 VITALS
WEIGHT: 268 LBS | TEMPERATURE: 97.8 F | HEIGHT: 68 IN | HEART RATE: 42 BPM | RESPIRATION RATE: 13 BRPM | BODY MASS INDEX: 40.62 KG/M2 | DIASTOLIC BLOOD PRESSURE: 70 MMHG | SYSTOLIC BLOOD PRESSURE: 112 MMHG

## 2021-06-28 NOTE — PROGRESS NOTES
Progress Notes by Debi Lopez PA-C at 10/14/2019  8:00 AM     Author: Debi Lopez PA-C Service: -- Author Type: Physician Assistant    Filed: 10/14/2019 11:09 AM Encounter Date: 10/14/2019 Status: Signed    : Debi Lopez PA-C (Physician Assistant)        Zestar Study    Physical Examination  For abnormal findings, please evaluate if the finding is Clinically Significant (by 'CS') or Not Clinically Significant (by 'NCS')  General Appearance Normal  Head and Neck  Normal  Lungs    Normal  Cardiovascular  Abnormal- tom (NCS)  Abdomen   Abnormal- obese (NCS)  Musculoskeletal/Extremities Normal   Lymph Nodes  Normal  Skin    Normal  Neurological   Normal   Tremor absent     If present, evaluate severity on 1-10 scale    Debi Lopez PA-C

## 2021-06-28 NOTE — PROGRESS NOTES
Progress Notes by Loco Sequeira at 10/17/2019  8:44 AM     Author: Loco Sequeira Service: -- Author Type: Patient Access    Filed: 10/17/2019  9:12 AM Encounter Date: 10/17/2019 Status: Signed    : Loco Sequeira (Patient Access)         Zestar Study Device Return    Mike Schultz returned all the devices for the Zestar study.  Mike M Antoine denies medication changes or adverse events since last visit.    Mike LIMON Antoine has now completed their participation in the Zestar study.   Thank you for your gift of participation.    Loco Sequeira

## 2021-06-28 NOTE — PROGRESS NOTES
Progress Notes by Loco Sequeira at 10/14/2019  8:00 AM     Author: Loco Sequeira Service: -- Author Type: Patient Access    Filed: 11/1/2019  1:21 PM Encounter Date: 10/14/2019 Status: Addendum    : Loco Sequeira (Patient Access)    Related Notes: Original Note by Loco Sequeira (Patient Access) filed at 10/14/2019 11:09 AM             Zestar Study Consent Visit    Study description: ECG and PPG Study: Zestar Study      Note time seated: 0812     Mike Schultz a 69 y.o. male , was seen in [facility] today to discuss participation in the Zestar study.   The consent discussion began on 11 Oct 2019.  Please refer to phone call note from Jordana Sosa for more details.  The consent form was reviewed with the patient.     The review of the study included:    Study purpose     Conflict of interest    Device description      Study visits    Risks of participation    Benefits (if any)    Alternatives    Voluntary participation    Confidentiality     Compensation/costs of participation    Study stipends    Injury and legal rights    The subject was provided time to review the consent form and consider participation. his questions were answered to his satisfaction.   The patient has voluntarily agreed to participate in the above noted study.     The consent form version 25 Sep 2019 and HIPAA form version 11 Jun 2019 was signed 10/14/19 at 0830    The subject was provided with a copy of the consent form and HIPPA. A copy of the signed forms was forwarded to medical records.    No study procedures were done prior to Mike Schultz providing informed consent.     Loco Sequeira    Subject Restrictions During Study -Confirmed with Subject prior to any study procedures completed    Restrictions on jewelry, recreational drugs, caffeine, and exercise few days prior and during study.   1. Subjects should not consume excessive amount of caffeine (6 or more 8-oz cups of coffee, or more than 570 mg of caffeine from energy drinks,  "pills or similar substance) during their participation in the study.   2. Subjects should not consume excessive amount of alcohol for the duration of their participation in the study. A typical moderate amount is allowed during stage 3.   3. Subjects should not take any recreational drugs (including, but not limited to methamphetamines, cocaine, opioids, cannabis, LSD) for the duration of their participation in the study.   4. Subjects should not wear underwire bra or jewelry during the in-lab study (to not interfere with electrode placement and ECG data recordings).   5. Subject will not be permitted to have their cell phone or any electronic recording device on or with them during the in-lab test session(s).   6. Subjects under 22 years old will not be permitted to take ECG recordings through the ECG sarath on the wrist-worn devices.     For study stage 3 only   1. Subjects should only do high intensity exercise (e.g. sprinting, heavy lifting, etc.) in the morning upon awakening or else not at all   2. Subjects should abstain from swimming during the time of the study   3. Subjects should only shower in the morning upon awakening (or else not at all)   4. Female subjects are strongly suggested to wear non-underwire bras throughout this stage of the study     Loco Sequeira      Study Data collections   Vitals  (TPBP)     Vitals:    10/14/19 0834 10/14/19 0838 10/14/19 0840   BP: 140/77 120/74 112/70   Patient Site: Left Arm Left Arm Left Arm   Patient Position: Sitting Sitting Sitting   Cuff Size: Adult Regular Adult Regular Adult Regular   Pulse: (!) 42 (!) 44 (!) 42   Resp: 13     Temp: 97.8  F (36.6  C)     TempSrc: Oral     Weight:   (!) 268 lb (121.6 kg)   Height:   5' 8\" (1.727 m)      VS taken after 5 min rest     MAP 1    93  MAP 2      84   MAP 3             94          Body mass index is 40.75 kg/m .  male  1950  69 y.o.      Note time patient placed in supine position: 0845    Ethnicity   []   " or    [x]  Not  or   Race   []   or    []    []  Black or   []   or Other   [x]  White  Physical Activity Level  per subject report:   []  0- Extremely Inactive []  1- Sedentary [x]  2- Moderately Active  []  3- Vigorously Active []  4- Extremely Active  Trained Athlete   [] Yes  [x] No     Dowell's' Skin type   [x] Type 1 [] Type 2 [] Type 3    [] Type 4 [] Type 5 [] Type 6    Subject participated in previous ECG study at [facility]: [] Yes    [x] No    Past Medical History:   Diagnosis Date   ? CPAP (continuous positive airway pressure) dependence 2017   ? Essential hypertension, benign 2002   ? H/O ETOH abuse    ? Herpes simplex 04/12/2005   ? Hyperlipidemia 09/26/2006   ? Impotence of organic origin 2002   ? Obesity 04/12/2005   ? Persistent atrial fibrillation 2017    persistent, DCCV 12/2017, ablation 11/6/17   ? Sleep apnea 20017   ? Tobacco use disorder     stopped smoking 6/2004       HISTORY OF HEART RHYTHM ABNORMALITIES   []  None   []  Atrial Flutter  [x] Frequent PACs (>3 in 30 secs)  [] AF (permanent)  []  Tachycardia  [] Bigeminy, trigeminy, and/or quadgeminy  []  AF (persistent)  []  Heart Block   []  AF (paroxysmal)  [] PVCs    [] AF (other)    [] BBB    []  Other:   How many years? 4  Special interest allergies: active allergic skin reactions  Allergies no known allergies    Current Outpatient Medications:   ?  allopurinol (ZYLOPRIM) 300 MG tablet, Take 300 mg by mouth daily., Disp: , Rfl:   ?  amLODIPine (NORVASC) 2.5 MG tablet, Take 2.5 mg by mouth daily., Disp: , Rfl:   ?  finasteride (PROPECIA) 1 mg tablet, Take 1 mg by mouth daily., Disp: , Rfl:   ?  furosemide (LASIX) 20 MG tablet, Take 20 mg by mouth 2 (two) times a day., Disp: , Rfl:   ?  losartan (COZAAR) 100 MG tablet, Take 100 mg by mouth daily., Disp: , Rfl:   ?  metoprolol tartrate (LOPRESSOR) 50 MG tablet, Take 50 mg by mouth 2  "(two) times a day., Disp: , Rfl:   ?  potassium chloride (MICRO-K) 10 mEq CR capsule, Take 10 mEq by mouth 2 (two) times a day., Disp: , Rfl:   ?  rivaroxaban (XARELTO) 20 mg tablet, Take 20 mg by mouth., Disp: , Rfl:       10-sec 12-lead ECG & 30-sec 12-lead ECG rhythm strip done; reviewed by & PE done by Debi Lopez  Subject Questionnaire    OCCUPATION:    Predominately works outdoors  [] Yes    [x] No      Hours/week spent outdoors (total, not only for work): 5    Frequently participates in \"hand intensive\" activities [x] Yes [] No  Caffeine  []  Never  [] Occasionally        []  Daily (1 cup/day)     [x]  Daily (>1 cup/day)    Alcohol   [x]  Never  [] Light (drink or 2 occasionally) []  Moderate (a drink or 2 almost daily)   []  Occasional-heavy (more than a few drinks <2x / month)  [] Heavy (more than a few drinks >2x / month)    Tobacco/nicotine  [x]  Never  [] Rarely  []  Frequently/ Daily     Mattress Information  [] Subject did not participate in Stage 3  Mattress type:  []  Memory foam [] Gel  [] Innerspring (coil)  [] Airbed  [] Waterbed [] Shikibuton  [x] Hybrid  [] No mattress  [] Other (comment):    Mattress foundation   [] Mattress on floor/ground    [] Mattress on foundation/box spring on floor/ground  [x] Mattress on foundation/box spring on bed frame  [] Mattress on tatami on floor/ground  [] Other (comment):    Mattress topper   [] No mattress topper  [x] Pillow top  [] Foam top - flat style  [] Foam top - \"egg crate\" style  [] Other (comment):    Co-sleeper    []  Yes  [x]  No    CPAP use   [x] Yes  [] No      Dominant hand [x] left  [] right [] ambidextrous  Preferred Wrist to wear band on   [x]  left   []  right   Were screening day & study day: [x]  same [] different   Same: wrist circumference:      200   mm     Device wearing wrist skin fold thickness:       7.4  mm  Wrist Band Size:     []  Flush Fit S/M  []  Non-Flush Fit S/M   [x]  Flush Fit M/L  []  Non-Flush Fit " M/L  []  Flush Fit XL  []  Non-Flush Fit XL    Preferred/natural band notch: 6  Secure band notch: 6  Crown orientation:  []  left   [x]  right  Device wearing wrist hairiness:     []  Light [x]  Medium       []  Heavy  Spectophotometer    L  A  B   Reading #1  52.77  12.59  19.03   Reading #2  54.06  12.19  18.75   Reading #3  52.56  12.78  18.79     Pregnancy test  for WOCBP    [x] n/a male or female not child bearing potential  Room Temperature ( C): 21  CS Laptop ID: [id number]  CS Cam ID:[id number]  Device Set ID:[id number]  Wrist Device ID:[id number]  Subject has now completed their in-house participation in the Zestar study. Subject will complete Stage 3 at home for the next 3 days and return the equipment on 10/17/2019.  Loco Sequeira

## 2021-06-28 NOTE — PROGRESS NOTES
Progress Notes by Loco Sequeira at 10/14/2019  8:00 AM     Author: Loco Sequeira Service: -- Author Type: Patient Access    Filed: 10/25/2019  4:52 PM Encounter Date: 10/14/2019 Status: Signed    : Michael Rogers MD (Physician)    Related Notes: Original Note by Loco Sequeira (Patient Access) filed at 10/14/2019 11:09 AM           [facility] Study Inclusion / Exclusion Criteria  Protocol Version 2.0 ([id number])    Inclusion Criteria    Yes No Criteria # Subject must meet all inclusion criteria:      [x]    []  1 At least 18 years old for NSR and 22 years old for Non-NSR. Inclusive for both cohorts, at time of screening, with no upper limit on age.        [x]      []  2 To be enrolled as a non-NSR subject the volunteer must have one of the following conditions: Permanent/Persistent AF, Hx of paroxysmal AF, Hx of High-rate AF, AF + rate control medication, Hx Atrial Flutter, PVC burden >1%, Frequent PACs, Hx BBB, Hx 2nd degree block (any type), Hx Bigeminy/Trigeminy/Quadgeminy, Hx Tachycardia. For subjects with any of the following diagnoses, the condition must be present at the time of screening:   ? Permanent/persistent AF  ? PVC Ewing  ? Frequent PACs   Note: If not present at screening, subjects may not be enrolled as a non-NSR subject or NSR subject.         3  [x] N/A HE site For Subjects to be enrolled as NSR they must be in NSR at the time of screening as determined by the investigator. For subjects with occasional ectopic beats (<1% burden during screening), they should still qualify as individuals in the NSR cohort as long as they don't have a medical history/diagnosis of significant ectopic burden.    [x]  []  4 Able to read and understand a written ICF    [x]  []  5 Willing and able to participate in the study procedures and comply with its restrictions    [x]  []  6 Able to communicate effectively with study staff as well as understand and follow directions    All must be Yes    Exclusion  Criteria-all must be no  Yes No Criteria # Subject must not meet any exclusion criteria:    []  [x]  1 Physical disability that prevents safe and adequate testing.   []  [x]  2 Pregnant women or women planning to become pregnant   []  [x]  3 Any acute illness or condition that may interfere with study procedures (e.g. cough, fever, sore throat, headache, sunburn, etc.)   []  [x]  4 Clinically significant hand tremors, as judged by the Investigator   []  [x]  5 Resting hypertension with systolic blood pressure ?161 mmHg or diastolic blood pressure ?101 mmHg (if at least 2 of 3 measurements meet this criteria)   []  [x]  6 Subjects with implanted cardiac devices such as a pacemaker or an automated implantable cardioverter- defibrillator (AICD)   []  [x]  7 Acute myocardial infarction (MI) within 90 days from the screening visit   []  [x]  8 Other cardiovascular disease that increases the risk to the subject or would render the data uninterpretable in the opinion of the Investigator (e.g., recent or ongoing unstable angina, significant valvular heart disease or chronic heart failure, myocarditis or pericarditis)    []  [x]  9 Acute pulmonary embolism, pulmonary infarction, or deep vein thrombosis within 90 days from the screening visit    []  [x]  10 Stroke or transient ischemic attack within 90 days from the screening visit    []  [x]  11 Known untreated medical conditions as determined by the Investigator, such as but not limited to significant anemia, important electrolyte imbalance and untreated or uncontrolled thyroid disease.    []  [x]  12 Any history of wrist surgery with scarring in the area of the sensor location on the wrist where the subject will be wearing the watch;    []  [x]  13 Open wound(s) on the wrist and forearm where the subject will be wearing the watch    []  [x]  14 Severe symptomatic (or active) overly dry/injured skin, skin disorders, or allergic skin reactions such as eczema, rosacea,  impetigo, dermatomyositis or allergic contact dermatitis on wrist and locations where the electrodes will be placed (e.g. chest, forearms, stomach), as determined by the investigator.    []  [x]  15 Tattoos, scars or moles in the area of the sensor location on the wrist where the subject will be wearing the watch    []  [x]  16 Device wearing Wrist circumference ? 129 mm or ? 246 mm    []  [x]  17 Known significant sensitivity to medical adhesives or isopropyl alcohol (for ECG electrode placement)    []  [x]  18 Known allergy or sensitivity to fluorocarbon-based synthetic rubber, such as contact dermatitis with fluoroelastomer bands primarily used in wrist worn fitness devices    []  [x]  19 Subjects with any Medical History, Physical exam, vital sign or any other study procedure finding/assessment that in the opinion of the investigator could compromise subject safety during study participation or interfere with the study integrity and/or the accurate assessment of the study objectives    []  [x]  20 Subject works for a company that develops or sells medical and/or fitness devices (e.g., ECG monitors, wearable fitness bands, sleep monitors, etc.) or are technology journalists (e.g., professional bloggers, TV, magazine, newspaper reporters, etc.)    []  [x]  21 Weight > 181 kg for subjects using the stationary bike and/or treadmill. Weight of ?138 kg for NSR subjects.    []  [x]  22 Subject is employed in shift work, or otherwise does not maintain a reasonably consistent day/night schedule (e.g. Subjects who go to bed after 4am).    []  [x]  23 Overnight travel planned during data collection nights    []  [x]  24 Non-NSR subjects should not have partaken in strenuous physical activity within 12 hours prior to screening    []  [x]  25 Non-NSR subjects with Atrial fibrillation categories: Subjects taking Class 1 or Class 3 antiarrhythmic agents such as the following may not take part in any stage of the study:  amiodarone, sotalol, dronedarone, ibutilide, dofetilide, propafenone, quinidine, procainamide, disopyramide, flecainide (Subjects taking class 2, 4 or 5 antiarrhythmic agents may take part the study).    []  [x]  26 Subjects who have both a history of paroxysmal AF and a Dowell skin type measurement of VI    []  [x]  27 Subjects who have missing index fingers on both hands      Patient fulfills study entry criteria.  No exclusion criteria.    Loco Sequeira

## 2021-06-29 ENCOUNTER — MYC MEDICAL ADVICE (OUTPATIENT)
Dept: CARDIOLOGY | Facility: CLINIC | Age: 71
End: 2021-06-29

## 2021-06-29 DIAGNOSIS — Z79.899 ON AMIODARONE THERAPY: Primary | ICD-10-CM

## 2021-06-29 DIAGNOSIS — E78.5 DYSLIPIDEMIA: ICD-10-CM

## 2021-06-29 NOTE — TELEPHONE ENCOUNTER
Spoke to pt and made him aware that Crystal put orders in for his labs, which include, BMP, Hep, lipid, TSH and [facility]. Explained that all the rest of his questions will be discussed on Thursday.  Pt states understanding. Miguel Ángel

## 2021-06-29 NOTE — TELEPHONE ENCOUNTER
He can have  BMP, hepatic, TSH and fasting lipids. He needs to schedule at [facility] but I've ordered.     Uric acid/Allopurinol questions need to go to Dr. Kay's group).    HR in 40-50s is fine overnight. Will discuss at OV. We may be stopping amio at that time.

## 2021-06-29 NOTE — PROGRESS NOTES
"[facility]    I had the pleasure of seeing Brayden when he came for follow up of arrhythmias.  This 70 year old sees Dr. Day for his history of:    1. AFib dx'd in 2012. Failed flecainide (and noted scar on MRI). Underwent PVI and SVC isolation 11/2017. Repeat PVI and CTI for inducible AFlutter 4/2019. Started on Amio 11/11 with DCCV 11/19/2020 for recurrent AFib in setting of increased alcohol use.   Amiodarone decreased 1/2021  2. Preserved EF based on echo 10/2017 and EF 51% on cMRI 10/2017  3. Benign Essential HTN   4. BOWEN  5. Myocardiac Scar noted on cardiac MRI 10/2017, consistent with prior MI in the left Cx territory. EF 51%.  Avoiding flecainide use  6. H/o Alcohol dependence, had not had EtOH use since 4/2019, but increased intake recently 10/2020  7. Chronic AC  On Xarelto for CHADSVASc 2 (HTN, age)    I last saw Brayden 12/2020 at which time heart rate remained low in 40s-60s.  He had not had any recurrent arrhythmia after his last cardioversion 11/19/2020.  I reduced metoprolol given low heart rates.  He saw Dr. Day in follow-up 1/7/2021.  Amiodarone was decreased to 100 mg daily.  Metoprolol was stopped as heart rates remained in the 50s.  6-month follow-up was recommended, with consideration of stopping amiodarone entirely.    Interval History:  Brayden feels \"great.\"  He's working out routinely and has lost 30 pounds since 12/2020 using Noom.  He has had no atrial fibrillation.  Notes heart rate is still slow in the 40s-50s on amiodarone 100 mg daily.He has had no issues with palpitations, lightheadedness, dizziness    Denies edema, orthopnea or PND.    BPs 120s over 70s at home.  His dentist is concerned that amlodipine could be affecting his gum tissue.    VITALS:  Vitals: /68   Pulse 65   Ht 1.727 m (5' 8\")   Wt 113.2 kg (249 lb 9.6 oz)   BMI 37.95 kg/m      Diagnostic Testing:  EKG today, which I overread, showed sinus bradycardia 45 bpm.  Nonspecific ST-T wave " changes  Echocardiogram 3/2019 showed EF of 55-60%.  No regional wall motion abnormalities were noted.  He had 1+ mitral regurgitation and 1+ tricuspid regurgitation.  RV was normal in structure, function and size.  Aortic valve showed just trace to mild aortic regurgitation with aortic sclerosis.  Mean gradient was 4.6 mmHg (improved from previous) volume index is 46.7 mL/m  with LA dimension of 4.9 cm.  Cardiac MRI 10/2017 showed an EF of 51% with mild hypokinesis involving the basal inferolateral segment.  Evidence of previous myocardial infarction in the circumflex territory was noted.  Component      Latest Ref Rng & Units 6/22/2020 8/14/2020 12/7/2020 6/30/2021   Cholesterol      <200 mg/dL 164 163 173 159   Triglycerides      <150 mg/dL 184 (H) 206 (H) 102 121   HDL Cholesterol      >39 mg/dL 32 (L) 37 (L) 39 (L) 44   LDL Cholesterol Calculated      <100 mg/dL 95 85 114 (H) 91   Non HDL Cholesterol      <130 mg/dL 132 (H) 126 134 (H) 115     Component      Latest Ref Rng & Units 12/7/2020 6/30/2021   Sodium      133 - 144 mmol/L 141 141   Potassium      3.4 - 5.3 mmol/L 3.8 3.6   Chloride      94 - 109 mmol/L 109 109   Carbon Dioxide      20 - 32 mmol/L 29 27   Anion Gap      3 - 14 mmol/L 3 5   Glucose      70 - 99 mg/dL 90 89   Urea Nitrogen      7 - 30 mg/dL 22 13   Creatinine      0.66 - 1.25 mg/dL 1.16 1.06   GFR Estimate      >60 mL/min/1.73:m2 64 71   GFR Estimate If Black      >60 mL/min/1.73:m2 74 82   Calcium      8.5 - 10.1 mg/dL 8.6 8.7     Component      Latest Ref Rng & Units 1/3/2020 9/21/2020 6/30/2021   TSH      0.40 - 4.00 mU/L 2.16 0.82 2.22     Component      Latest Ref Rng & Units 9/21/2020 6/30/2021   Bilirubin Total      0.2 - 1.3 mg/dL 0.3 0.6   Albumin      3.4 - 5.0 g/dL 3.2 (L) 3.6   Protein Total      6.8 - 8.8 g/dL 7.2 7.1   Alkaline Phosphatase      40 - 150 U/L 92 87   ALT      0 - 70 U/L 28 42   AST      0 - 45 U/L 20 24   Bilirubin Direct      0.0 - 0.2 mg/dL  0.2        Plan:  Stop furosemide 20 mg daily and potassium chloride 20 mEq daily  Stop amlodipine due to gum issues.  Monitor blood pressure  See us back 6 months with repeat lipids/BMP    Assessment/Plan:    1. H/o recurrent persistent AFib; nl EF    Last DCCV 11/2020 after starting on amiodarone load.  He has maintained sinus rhythm/bradycardia after metoprolol was stopped and amiodarone decreased to 100 mg daily 1/2021.    He has abstained from alcohol, which is typically one of his major triggers.    PLAN:    Stop amiodarone.  Continue off AV enrrique blocking agents given underlying bradycardia.  This is asymptomatic, so I explained to Brayden that we do not need to do anything for this at this point.    Continue Xarelto    Given normalization of EF and no edema, will stop furosemide 20 mg daily and potassium chloride 20 mEq daily.  He will continue to weigh himself daily and contact us if weight goes up more than 3 pounds overnight or 5 pounds in a week    See us back 6 months    2. On amiodarone therapy    As above, TSH and hepatic panel look great    PLAN:    Stop amiodarone    3. Dyslipidemia    Lipids look great!     His weight loss and exercise have really helped    PLAN:    Continue great lifestyle modifications    Repeat lipids in 6 months    4. Hypertension    Blood pressures have looked excellent.  There was concern from his dentist that amlodipine 2.5 mg daily may be contributing to some gum issues    PLAN:    We will try him off amlodipine 2.5 mg daily.  Continue to check blood pressure and he will contact us if they are routinely above 130 mmHg if that is the case, we will discuss if we should restart amlodipine 2.5 or initiate a thiazide diuretic or aldosterone antagonist for blood pressure.    See us back 6 months with BMP, calling us if needed    Crystal Campos PA-C, MSPAS      Orders Placed This Encounter   Procedures     Lipid Profile     ALT     Basic metabolic panel     Follow-Up with Cardiac Advanced  Practice Provider     EKG 12-lead complete w/read - Clinics (performed today)     EKG 12-lead complete w/read - Clinics     Orders Placed This Encounter   Medications     rivaroxaban ANTICOAGULANT (XARELTO ANTICOAGULANT) 20 MG TABS tablet     Sig: Take 1 tablet (20 mg) by mouth daily (with dinner)     Dispense:  90 tablet     Refill:  3     Keep on file until pt due     Medications Discontinued During This Encounter   Medication Reason     amiodarone (PACERONE) 200 MG tablet      predniSONE (DELTASONE) 5 MG tablet Therapy completed     furosemide (LASIX) 20 MG tablet      potassium chloride ER (K-TAB/KLOR-CON) 10 MEQ CR tablet      amLODIPine (NORVASC) 2.5 MG tablet      XARELTO ANTICOAGULANT 20 MG TABS tablet Reorder         Encounter Diagnoses   Name Primary?     Persistent atrial fibrillation (H)      On amiodarone therapy Yes     Essential hypertension        CURRENT MEDICATIONS:  Current Outpatient Medications   Medication Sig Dispense Refill     allopurinol (ZYLOPRIM) 300 MG tablet Take 1 tablet (300 mg) by mouth daily 90 tablet 3     cetirizine (ZYRTEC) 10 MG tablet Take 10 mg by mouth as needed for allergies       finasteride (PROPECIA) 1 MG tablet Take 1 tablet (1 mg) by mouth daily 90 tablet 3     losartan (COZAAR) 100 MG tablet Take 1 tablet (100 mg) by mouth daily 90 tablet 2     rivaroxaban ANTICOAGULANT (XARELTO ANTICOAGULANT) 20 MG TABS tablet Take 1 tablet (20 mg) by mouth daily (with dinner) 90 tablet 3     order for DME Equipment ordered: RESMED CPAP Mask type: Full face  Settings: 15         ALLERGIES     Allergies   Allergen Reactions     No Known Drug Allergies          Review of Systems:  Skin:  Negative     Eyes:  Positive for glasses(reading only)  ENT:  Negative    Respiratory:  Positive for CPAP;sleep apnea  Cardiovascular:  Negative for;palpitations;chest pain;dizziness;lightheadedness;fatigue;edema    Gastroenterology: Negative for melena;hematochezia  Genitourinary:  Positive for urinary  "frequency  Musculoskeletal:  Positive for joint pain  Neurologic:  Positive for numbness or tingling of hands  Psychiatric:  Positive for excessive alcohol consumption  Heme/Lymph/Imm:  Negative    Endocrine:  Negative      Physical Exam:  Vitals: /68   Pulse 65   Ht 1.727 m (5' 8\")   Wt 113.2 kg (249 lb 9.6 oz)   BMI 37.95 kg/m      Constitutional:  cooperative, alert and oriented, well developed, well nourished, in no acute distress overweight      Skin:  warm and dry to the touch, no apparent skin lesions or masses noted        Head:  normocephalic, no masses or lesions        Eyes:  pupils equal and round;conjunctivae and lids unremarkable;sclera white        ENT:  no pallor or cyanosis, dentition good        Neck:  JVP normal;no carotid bruit        Chest:  normal breath sounds, clear to auscultation, normal A-P diameter, normal symmetry, normal respiratory excursion, no use of accessory muscles        Cardiac: regular rhythm bradycardic                Abdomen:  abdomen soft obese      Vascular: pulses full and equal                                      Extremities and Back:  no deformities, clubbing, cyanosis, erythema observed;no edema        Neurological:  no gross motor deficits            PAST MEDICAL HISTORY:  Past Medical History:   Diagnosis Date     H/O ETOH abuse      Herpes simplex without mention of complication      Hyperlipidemia      Obesity      Persistent atrial fibrillation (H)     persistent, DCCV 12/2017, ablation 11/6/17     Tobacco use disorder     dced 6/2004       PAST SURGICAL HISTORY:  Past Surgical History:   Procedure Laterality Date     ANESTHESIA CARDIOVERSION N/A 2/26/2019    Procedure: ANESTHESIA CARDIOVERSION (PRIETO);  Surgeon: GENERIC ANESTHESIA PROVIDER;  Location:  OR     ANESTHESIA CARDIOVERSION N/A 11/19/2020    Procedure: ANESTHESIA, FOR CARDIOVERSION (dr campbell);  Surgeon: GENERIC ANESTHESIA PROVIDER;  Location:  OR     C TOTAL KNEE ARTHROPLASTY Left " 2018    Dr. Malick Suarez     EP ABLATION FOCAL AFIB N/A 2019    Procedure: EP Ablation Focal AFIB;  Surgeon: Fady Day MD;  Location:  HEART CARDIAC CATH LAB     HERNIORRHAPHY UMBILICAL  2012    Procedure: HERNIORRHAPHY UMBILICAL;  UMBILICAL HERNIA REPAIR;  Surgeon: Ra Crocker MD;  Location:  [facility]     ORTHOPEDIC SURGERY       Z NONSPECIFIC PROCEDURE      achilles tendon     ZZ NONSPECIFIC PROCEDURE      knees, arthroscopy     ZZ NONSPECIFIC PROCEDURE      basal cell skin ca, nose     ZZC NONSPECIFIC PROCEDURE      flex sig; colonoscopy due 3/2008     Z NONSPECIFIC PROCEDURE      cardioversion for atr fib       FAMILY HISTORY:  Family History   Problem Relation Age of Onset     Family History Negative Mother      Heart Disease Father         decesased at 42 - MI     Heart Disease Sister          MI     Heart Disease Brother          MI     Heart Disease Brother         CABG AT 49     Lipids Sister      Lipids Sister      Lipids Sister      Family History Negative Brother        SOCIAL HISTORY:  Social History     Socioeconomic History     Marital status: Single     Spouse name: None     Number of children: None     Years of education: None     Highest education level: None   Occupational History     None   Social Needs     Financial resource strain: None     Food insecurity     Worry: None     Inability: None     Transportation needs     Medical: None     Non-medical: None   Tobacco Use     Smoking status: Former Smoker     Packs/day: 1.00     Years: 10.00     Pack years: 10.00     Types: Cigarettes     Start date:      Quit date: 2011     Years since quittin.8     Smokeless tobacco: Never Used   Substance and Sexual Activity     Alcohol use: No     Frequency: Never     Drug use: No     Sexual activity: Yes     Partners: Female   Lifestyle     Physical activity     Days per week: None     Minutes per session: None     Stress: None    Relationships     Social connections     Talks on phone: None     Gets together: None     Attends Taoism service: None     Active member of club or organization: None     Attends meetings of clubs or organizations: None     Relationship status: None     Intimate partner violence     Fear of current or ex partner: None     Emotionally abused: None     Physically abused: None     Forced sexual activity: None   Other Topics Concern     Parent/sibling w/ CABG, MI or angioplasty before 65F 55M? Not Asked   Social History Narrative     None     ]

## 2021-06-30 DIAGNOSIS — Z79.899 ON AMIODARONE THERAPY: ICD-10-CM

## 2021-06-30 DIAGNOSIS — E78.5 DYSLIPIDEMIA: ICD-10-CM

## 2021-06-30 LAB
ALBUMIN SERPL-MCNC: 3.6 G/DL (ref 3.4–5)
ALP SERPL-CCNC: 87 U/L (ref 40–150)
ALT SERPL W P-5'-P-CCNC: 42 U/L (ref 0–70)
ANION GAP SERPL CALCULATED.3IONS-SCNC: 5 MMOL/L (ref 3–14)
AST SERPL W P-5'-P-CCNC: 24 U/L (ref 0–45)
BILIRUB DIRECT SERPL-MCNC: 0.2 MG/DL (ref 0–0.2)
BILIRUB SERPL-MCNC: 0.6 MG/DL (ref 0.2–1.3)
BUN SERPL-MCNC: 13 MG/DL (ref 7–30)
CALCIUM SERPL-MCNC: 8.7 MG/DL (ref 8.5–10.1)
CHLORIDE SERPL-SCNC: 109 MMOL/L (ref 94–109)
CHOLEST SERPL-MCNC: 159 MG/DL
CO2 SERPL-SCNC: 27 MMOL/L (ref 20–32)
CREAT SERPL-MCNC: 1.06 MG/DL (ref 0.66–1.25)
GFR SERPL CREATININE-BSD FRML MDRD: 71 ML/MIN/{1.73_M2}
GLUCOSE SERPL-MCNC: 89 MG/DL (ref 70–99)
HDLC SERPL-MCNC: 44 MG/DL
LDLC SERPL CALC-MCNC: 91 MG/DL
NONHDLC SERPL-MCNC: 115 MG/DL
POTASSIUM SERPL-SCNC: 3.6 MMOL/L (ref 3.4–5.3)
PROT SERPL-MCNC: 7.1 G/DL (ref 6.8–8.8)
SODIUM SERPL-SCNC: 141 MMOL/L (ref 133–144)
TRIGL SERPL-MCNC: 121 MG/DL
TSH SERPL DL<=0.005 MIU/L-ACNC: 2.22 MU/L (ref 0.4–4)

## 2021-06-30 PROCEDURE — 84443 ASSAY THYROID STIM HORMONE: CPT | Performed by: PHYSICIAN ASSISTANT

## 2021-06-30 PROCEDURE — 80048 BASIC METABOLIC PNL TOTAL CA: CPT | Performed by: PHYSICIAN ASSISTANT

## 2021-06-30 PROCEDURE — 80076 HEPATIC FUNCTION PANEL: CPT | Performed by: PHYSICIAN ASSISTANT

## 2021-06-30 PROCEDURE — 36415 COLL VENOUS BLD VENIPUNCTURE: CPT | Performed by: PHYSICIAN ASSISTANT

## 2021-06-30 PROCEDURE — 80061 LIPID PANEL: CPT | Performed by: PHYSICIAN ASSISTANT

## 2021-07-01 ENCOUNTER — OFFICE VISIT (OUTPATIENT)
Dept: CARDIOLOGY | Facility: CLINIC | Age: 71
End: 2021-07-01
Attending: INTERNAL MEDICINE
Payer: COMMERCIAL

## 2021-07-01 VITALS
BODY MASS INDEX: 37.83 KG/M2 | HEIGHT: 68 IN | WEIGHT: 249.6 LBS | HEART RATE: 65 BPM | DIASTOLIC BLOOD PRESSURE: 68 MMHG | SYSTOLIC BLOOD PRESSURE: 123 MMHG

## 2021-07-01 DIAGNOSIS — I10 ESSENTIAL HYPERTENSION: ICD-10-CM

## 2021-07-01 DIAGNOSIS — I48.19 PERSISTENT ATRIAL FIBRILLATION (H): ICD-10-CM

## 2021-07-01 DIAGNOSIS — Z79.899 ON AMIODARONE THERAPY: Primary | ICD-10-CM

## 2021-07-01 PROCEDURE — 93000 ELECTROCARDIOGRAM COMPLETE: CPT | Performed by: PHYSICIAN ASSISTANT

## 2021-07-01 PROCEDURE — 99214 OFFICE O/P EST MOD 30 MIN: CPT | Mod: 25 | Performed by: PHYSICIAN ASSISTANT

## 2021-07-01 ASSESSMENT — MIFFLIN-ST. JEOR: SCORE: 1871.68

## 2021-07-01 NOTE — PATIENT INSTRUCTIONS
Brayden - it was great to see you today!    1. EKG today showed normal rhythm - just slow.  As you don't feel this (dizziness, lightheadedness), no worries about this  2. Reviewed that you've not had any AFib, have not had alcohol and are still working out - this is great news!  3. Reviewed that BPs at home are good 120/70  4. Cholesterol and kidneys/electrolytes look AWESOME!  5. CONGRATULATIONS on your weight loss!  Your BMI was 44 in 12/2020 @ 280#.  Now it's down to 38 @ ~250#    PLAN:  1. OK to STOP amiodarone!  2. OK to STOP furosemide - Watch carefully to make sure you're not filling up with fluid! (rapid weight gain, swollen legs/feet/ankles, trouble with breathing). CALL if this happens! [phone]  3. OK to STOP potassium b/c you're not going to be taking the furosemide  4. OK to STOP amlodipine b/c BP is getting lower and it might be causing gum issues.  WATCH BP ... if they're climbing (130-140s) then we'll either restart amlodipine OR try something for BP that wouldn't affect gums.    5. See us back in 6 months but CALL if issues! [phone]

## 2021-07-01 NOTE — LETTER
"7/1/2021    Johnny Kay MD  [address] [facility] [address]    RE: Mike Schultz       Dear Colleague,    I had the pleasure of seeing Mike BRAXTON Schultz in the [facility].    [facility]    I had the pleasure of seeing Brayden when he came for follow up of arrhythmias.  This 70 year old sees Dr. Day for his history of:    1. AFib dx'd in 2012. Failed flecainide (and noted scar on MRI). Underwent PVI and SVC isolation 11/2017. Repeat PVI and CTI for inducible AFlutter 4/2019. Started on Amio 11/11 with DCCV 11/19/2020 for recurrent AFib in setting of increased alcohol use.   Amiodarone decreased 1/2021  2. Preserved EF based on echo 10/2017 and EF 51% on cMRI 10/2017  3. Benign Essential HTN   4. BOWEN  5. Myocardiac Scar noted on cardiac MRI 10/2017, consistent with prior MI in the left Cx territory. EF 51%.  Avoiding flecainide use  6. H/o Alcohol dependence, had not had EtOH use since 4/2019, but increased intake recently 10/2020  7. Chronic AC  On Xarelto for CHADSVASc 2 (HTN, age)    I last saw Brayden 12/2020 at which time heart rate remained low in 40s-60s.  He had not had any recurrent arrhythmia after his last cardioversion 11/19/2020.  I reduced metoprolol given low heart rates.  He saw Dr. Day in follow-up 1/7/2021.  Amiodarone was decreased to 100 mg daily.  Metoprolol was stopped as heart rates remained in the 50s.  6-month follow-up was recommended, with consideration of stopping amiodarone entirely.    Interval History:  Brayden feels \"great.\"  He's working out routinely and has lost 30 pounds since 12/2020 using Noom.  He has had no atrial fibrillation.  Notes heart rate is still slow in the 40s-50s on amiodarone 100 mg daily.He has had no issues with palpitations, lightheadedness, dizziness    Denies edema, orthopnea or PND.    BPs 120s over 70s at home.  His dentist is concerned that amlodipine could be affecting his " "gum tissue.    VITALS:  Vitals: /68   Pulse 65   Ht 1.727 m (5' 8\")   Wt 113.2 kg (249 lb 9.6 oz)   BMI 37.95 kg/m      Diagnostic Testing:  EKG today, which I overread, showed sinus bradycardia 45 bpm.  Nonspecific ST-T wave changes  Echocardiogram 3/2019 showed EF of 55-60%.  No regional wall motion abnormalities were noted.  He had 1+ mitral regurgitation and 1+ tricuspid regurgitation.  RV was normal in structure, function and size.  Aortic valve showed just trace to mild aortic regurgitation with aortic sclerosis.  Mean gradient was 4.6 mmHg (improved from previous) volume index is 46.7 mL/m  with LA dimension of 4.9 cm.  Cardiac MRI 10/2017 showed an EF of 51% with mild hypokinesis involving the basal inferolateral segment.  Evidence of previous myocardial infarction in the circumflex territory was noted.  Component      Latest Ref Rng & Units 6/22/2020 8/14/2020 12/7/2020 6/30/2021   Cholesterol      <200 mg/dL 164 163 173 159   Triglycerides      <150 mg/dL 184 (H) 206 (H) 102 121   HDL Cholesterol      >39 mg/dL 32 (L) 37 (L) 39 (L) 44   LDL Cholesterol Calculated      <100 mg/dL 95 85 114 (H) 91   Non HDL Cholesterol      <130 mg/dL 132 (H) 126 134 (H) 115     Component      Latest Ref Rng & Units 12/7/2020 6/30/2021   Sodium      133 - 144 mmol/L 141 141   Potassium      3.4 - 5.3 mmol/L 3.8 3.6   Chloride      94 - 109 mmol/L 109 109   Carbon Dioxide      20 - 32 mmol/L 29 27   Anion Gap      3 - 14 mmol/L 3 5   Glucose      70 - 99 mg/dL 90 89   Urea Nitrogen      7 - 30 mg/dL 22 13   Creatinine      0.66 - 1.25 mg/dL 1.16 1.06   GFR Estimate      >60 mL/min/1.73:m2 64 71   GFR Estimate If Black      >60 mL/min/1.73:m2 74 82   Calcium      8.5 - 10.1 mg/dL 8.6 8.7     Component      Latest Ref Rng & Units 1/3/2020 9/21/2020 6/30/2021   TSH      0.40 - 4.00 mU/L 2.16 0.82 2.22     Component      Latest Ref Rng & Units 9/21/2020 6/30/2021   Bilirubin Total      0.2 - 1.3 mg/dL 0.3 0.6 "   Albumin      3.4 - 5.0 g/dL 3.2 (L) 3.6   Protein Total      6.8 - 8.8 g/dL 7.2 7.1   Alkaline Phosphatase      40 - 150 U/L 92 87   ALT      0 - 70 U/L 28 42   AST      0 - 45 U/L 20 24   Bilirubin Direct      0.0 - 0.2 mg/dL  0.2       Plan:  Stop furosemide 20 mg daily and potassium chloride 20 mEq daily  Stop amlodipine due to gum issues.  Monitor blood pressure  See us back 6 months with repeat lipids/BMP    Assessment/Plan:    1. H/o recurrent persistent AFib; nl EF    Last DCCV 11/2020 after starting on amiodarone load.  He has maintained sinus rhythm/bradycardia after metoprolol was stopped and amiodarone decreased to 100 mg daily 1/2021.    He has abstained from alcohol, which is typically one of his major triggers.    PLAN:    Stop amiodarone.  Continue off AV enrrique blocking agents given underlying bradycardia.  This is asymptomatic, so I explained to Brayden that we do not need to do anything for this at this point.    Continue Xarelto    Given normalization of EF and no edema, will stop furosemide 20 mg daily and potassium chloride 20 mEq daily.  He will continue to weigh himself daily and contact us if weight goes up more than 3 pounds overnight or 5 pounds in a week    See us back 6 months    2. On amiodarone therapy    As above, TSH and hepatic panel look great    PLAN:    Stop amiodarone    3. Dyslipidemia    Lipids look great!     His weight loss and exercise have really helped    PLAN:    Continue great lifestyle modifications    Repeat lipids in 6 months    4. Hypertension    Blood pressures have looked excellent.  There was concern from his dentist that amlodipine 2.5 mg daily may be contributing to some gum issues    PLAN:    We will try him off amlodipine 2.5 mg daily.  Continue to check blood pressure and he will contact us if they are routinely above 130 mmHg if that is the case, we will discuss if we should restart amlodipine 2.5 or initiate a thiazide diuretic or aldosterone antagonist for  blood pressure.    See us back 6 months with BMP, calling us if needed    Crystal Campos PA-C, MSPAS      Orders Placed This Encounter   Procedures     Lipid Profile     ALT     Basic metabolic panel     Follow-Up with Cardiac Advanced Practice Provider     EKG 12-lead complete w/read - Clinics (performed today)     EKG 12-lead complete w/read - Clinics     Orders Placed This Encounter   Medications     rivaroxaban ANTICOAGULANT (XARELTO ANTICOAGULANT) 20 MG TABS tablet     Sig: Take 1 tablet (20 mg) by mouth daily (with dinner)     Dispense:  90 tablet     Refill:  3     Keep on file until pt due     Medications Discontinued During This Encounter   Medication Reason     amiodarone (PACERONE) 200 MG tablet      predniSONE (DELTASONE) 5 MG tablet Therapy completed     furosemide (LASIX) 20 MG tablet      potassium chloride ER (K-TAB/KLOR-CON) 10 MEQ CR tablet      amLODIPine (NORVASC) 2.5 MG tablet      XARELTO ANTICOAGULANT 20 MG TABS tablet Reorder         Encounter Diagnoses   Name Primary?     Persistent atrial fibrillation (H)      On amiodarone therapy Yes     Essential hypertension        CURRENT MEDICATIONS:  Current Outpatient Medications   Medication Sig Dispense Refill     allopurinol (ZYLOPRIM) 300 MG tablet Take 1 tablet (300 mg) by mouth daily 90 tablet 3     cetirizine (ZYRTEC) 10 MG tablet Take 10 mg by mouth as needed for allergies       finasteride (PROPECIA) 1 MG tablet Take 1 tablet (1 mg) by mouth daily 90 tablet 3     losartan (COZAAR) 100 MG tablet Take 1 tablet (100 mg) by mouth daily 90 tablet 2     rivaroxaban ANTICOAGULANT (XARELTO ANTICOAGULANT) 20 MG TABS tablet Take 1 tablet (20 mg) by mouth daily (with dinner) 90 tablet 3     order for DME Equipment ordered: RESMED CPAP Mask type: Full face  Settings: 15         ALLERGIES     Allergies   Allergen Reactions     No Known Drug Allergies          Review of Systems:  Skin:  Negative     Eyes:  Positive for glasses(reading only)  ENT:  Negative   "  Respiratory:  Positive for CPAP;sleep apnea  Cardiovascular:  Negative for;palpitations;chest pain;dizziness;lightheadedness;fatigue;edema    Gastroenterology: Negative for melena;hematochezia  Genitourinary:  Positive for urinary frequency  Musculoskeletal:  Positive for joint pain  Neurologic:  Positive for numbness or tingling of hands  Psychiatric:  Positive for excessive alcohol consumption  Heme/Lymph/Imm:  Negative    Endocrine:  Negative      Physical Exam:  Vitals: /68   Pulse 65   Ht 1.727 m (5' 8\")   Wt 113.2 kg (249 lb 9.6 oz)   BMI 37.95 kg/m      Constitutional:  cooperative, alert and oriented, well developed, well nourished, in no acute distress overweight      Skin:  warm and dry to the touch, no apparent skin lesions or masses noted        Head:  normocephalic, no masses or lesions        Eyes:  pupils equal and round;conjunctivae and lids unremarkable;sclera white        ENT:  no pallor or cyanosis, dentition good        Neck:  JVP normal;no carotid bruit        Chest:  normal breath sounds, clear to auscultation, normal A-P diameter, normal symmetry, normal respiratory excursion, no use of accessory muscles        Cardiac: regular rhythm bradycardic                Abdomen:  abdomen soft obese      Vascular: pulses full and equal                                      Extremities and Back:  no deformities, clubbing, cyanosis, erythema observed;no edema        Neurological:  no gross motor deficits            PAST MEDICAL HISTORY:  Past Medical History:   Diagnosis Date     H/O ETOH abuse      Herpes simplex without mention of complication      Hyperlipidemia      Obesity      Persistent atrial fibrillation (H)     persistent, DCCV 12/2017, ablation 11/6/17     Tobacco use disorder     dced 6/2004       PAST SURGICAL HISTORY:  Past Surgical History:   Procedure Laterality Date     ANESTHESIA CARDIOVERSION N/A 2/26/2019    Procedure: ANESTHESIA CARDIOVERSION (PRIETO);  Surgeon: GENERIC " ANESTHESIA PROVIDER;  Location:  OR     ANESTHESIA CARDIOVERSION N/A 2020    Procedure: ANESTHESIA, FOR CARDIOVERSION (dr campbell);  Surgeon: GENERIC ANESTHESIA PROVIDER;  Location:  OR     C TOTAL KNEE ARTHROPLASTY Left 2018    Dr. Malick Suarez     EP ABLATION FOCAL AFIB N/A 2019    Procedure: EP Ablation Focal AFIB;  Surgeon: Fady Day MD;  Location:  HEART CARDIAC CATH LAB     HERNIORRHAPHY UMBILICAL  2012    Procedure: HERNIORRHAPHY UMBILICAL;  UMBILICAL HERNIA REPAIR;  Surgeon: Ra Crocker MD;  Location:  SD     ORTHOPEDIC SURGERY       [facility] NONSPECIFIC PROCEDURE      achilles tendon     [facility] NONSPECIFIC PROCEDURE      knees, arthroscopy     [facility] NONSPECIFIC PROCEDURE      basal cell skin ca, nose     Z NONSPECIFIC PROCEDURE      flex sig; colonoscopy due 3/2008     [facility] NONSPECIFIC PROCEDURE      cardioversion for atr fib       FAMILY HISTORY:  Family History   Problem Relation Age of Onset     Family History Negative Mother      Heart Disease Father         decesased at 42 - MI     Heart Disease Sister          MI     Heart Disease Brother          MI     Heart Disease Brother         CABG AT 49     Lipids Sister      Lipids Sister      Lipids Sister      Family History Negative Brother        SOCIAL HISTORY:  Social History     Socioeconomic History     Marital status: Single     Spouse name: None     Number of children: None     Years of education: None     Highest education level: None   Occupational History     None   Social Needs     Financial resource strain: None     Food insecurity     Worry: None     Inability: None     Transportation needs     Medical: None     Non-medical: None   Tobacco Use     Smoking status: Former Smoker     Packs/day: 1.00     Years: 10.00     Pack years: 10.00     Types: Cigarettes     Start date:      Quit date: 2011     Years since quittin.8     Smokeless tobacco: Never Used   Substance and Sexual  Activity     Alcohol use: No     Frequency: Never     Drug use: No     Sexual activity: Yes     Partners: Female   Lifestyle     Physical activity     Days per week: None     Minutes per session: None     Stress: None   Relationships     Social connections     Talks on phone: None     Gets together: None     Attends [employer] service: None     Active member of club or organization: None     Attends meetings of clubs or organizations: None     Relationship status: None     Intimate partner violence     Fear of current or ex partner: None     Emotionally abused: None     Physically abused: None     Forced sexual activity: None   Other Topics Concern     Parent/sibling w/ CABG, MI or angioplasty before 65F 55M? Not Asked   Social History Narrative     None     ]      Thank you for allowing me to participate in the care of your patient.      Sincerely,     Natalie Campos PA-C     [facility]  cc:   Fady Kent MD  [address]  [place] [address]

## 2021-07-20 DIAGNOSIS — G47.33 OBSTRUCTIVE SLEEP APNEA (ADULT) (PEDIATRIC): Primary | ICD-10-CM

## 2021-07-25 DIAGNOSIS — M10.9 GOUT OF FOOT, UNSPECIFIED CAUSE, UNSPECIFIED CHRONICITY, UNSPECIFIED LATERALITY: ICD-10-CM

## 2021-07-26 DIAGNOSIS — L65.9 ALOPECIA: ICD-10-CM

## 2021-07-26 RX ORDER — ALLOPURINOL 300 MG/1
300 TABLET ORAL DAILY
Qty: 90 TABLET | Refills: 0 | OUTPATIENT
Start: 2021-07-26

## 2021-07-26 NOTE — TELEPHONE ENCOUNTER
Routing refill request to provider for review/approval because:  Patient needs to establish with new provider since Dr. Kay no longer here    Que Fuller, PRABHA  [facility] Triage Nurse

## 2021-07-27 ENCOUNTER — MYC MEDICAL ADVICE (OUTPATIENT)
Dept: INTERNAL MEDICINE | Facility: CLINIC | Age: 71
End: 2021-07-27

## 2021-07-27 RX ORDER — FINASTERIDE 1 MG/1
1 TABLET, FILM COATED ORAL DAILY
Qty: 90 TABLET | Refills: 0 | OUTPATIENT
Start: 2021-07-27

## 2021-07-27 NOTE — TELEPHONE ENCOUNTER
Routing refill request to provider for review/approval because:  Drug not on the [facility] refill protocol   PCP no longer here, needs to establish with new PCP      Que Fuller RN  [facility] Triage Nurse

## 2021-07-28 DIAGNOSIS — L65.9 ALOPECIA: ICD-10-CM

## 2021-07-28 RX ORDER — FINASTERIDE 1 MG/1
1 TABLET, FILM COATED ORAL DAILY
Qty: 90 TABLET | Refills: 0 | OUTPATIENT
Start: 2021-07-28

## 2021-08-02 DIAGNOSIS — L65.9 ALOPECIA: ICD-10-CM

## 2021-08-03 RX ORDER — FINASTERIDE 1 MG/1
1 TABLET, FILM COATED ORAL DAILY
Qty: 90 TABLET | Refills: 0 | OUTPATIENT
Start: 2021-08-03

## 2021-08-03 NOTE — TELEPHONE ENCOUNTER
This is a duplicate refill request, that has been refused to the pharmacy.   LVM for patient to schedule and appointment for refills   Que Fuller RN  [facility] Triage Nurse

## 2021-08-05 ENCOUNTER — MYC MEDICAL ADVICE (OUTPATIENT)
Dept: INTERNAL MEDICINE | Facility: CLINIC | Age: 71
End: 2021-08-05

## 2021-08-05 DIAGNOSIS — L65.9 ALOPECIA: ICD-10-CM

## 2021-08-06 ENCOUNTER — MYC MEDICAL ADVICE (OUTPATIENT)
Dept: CARDIOLOGY | Facility: CLINIC | Age: 71
End: 2021-08-06

## 2021-08-06 RX ORDER — FINASTERIDE 1 MG/1
1 TABLET, FILM COATED ORAL DAILY
Qty: 90 TABLET | Refills: 0 | Status: SHIPPED | OUTPATIENT
Start: 2021-08-06 | End: 2021-11-08

## 2021-08-06 NOTE — TELEPHONE ENCOUNTER
Routing refill request to provider for review/approval because:  Patient needs to be seen, mychart message sent to schedule an appointment last week, another message sent today please advise     Que Fuller, PRABHA  [facility] Triage Nurse

## 2021-08-18 DIAGNOSIS — M10.9 GOUT OF FOOT, UNSPECIFIED CAUSE, UNSPECIFIED CHRONICITY, UNSPECIFIED LATERALITY: ICD-10-CM

## 2021-08-20 RX ORDER — ALLOPURINOL 300 MG/1
300 TABLET ORAL DAILY
Qty: 90 TABLET | Refills: 0 | Status: SHIPPED | OUTPATIENT
Start: 2021-08-20 | End: 2021-11-19

## 2021-08-20 NOTE — TELEPHONE ENCOUNTER
Routing refill request to provider for review/approval because:  Last filled by Dr. Eliza Fuller, RN  [facility] Triage Nurse

## 2021-09-04 DIAGNOSIS — R60.0 PERIPHERAL EDEMA: ICD-10-CM

## 2021-09-07 RX ORDER — FUROSEMIDE 20 MG
TABLET ORAL
Qty: 180 TABLET | Refills: 0 | OUTPATIENT
Start: 2021-09-07

## 2021-09-07 NOTE — TELEPHONE ENCOUNTER
Routing refill request to provider for review/approval because:  Drug not active on patient's medication list    Patient needs new PCP     Terri Gaffney RN  [facility]

## 2021-09-09 ENCOUNTER — MYC MEDICAL ADVICE (OUTPATIENT)
Dept: INTERNAL MEDICINE | Facility: CLINIC | Age: 71
End: 2021-09-09

## 2021-10-06 ENCOUNTER — OFFICE VISIT (OUTPATIENT)
Dept: INTERNAL MEDICINE | Facility: CLINIC | Age: 71
End: 2021-10-06
Payer: COMMERCIAL

## 2021-10-06 VITALS
DIASTOLIC BLOOD PRESSURE: 80 MMHG | OXYGEN SATURATION: 97 % | TEMPERATURE: 98.1 F | BODY MASS INDEX: 36.19 KG/M2 | WEIGHT: 238 LBS | RESPIRATION RATE: 16 BRPM | SYSTOLIC BLOOD PRESSURE: 138 MMHG | HEART RATE: 71 BPM

## 2021-10-06 DIAGNOSIS — I48.19 PERSISTENT ATRIAL FIBRILLATION (H): ICD-10-CM

## 2021-10-06 DIAGNOSIS — M10.9 GOUT OF FOOT, UNSPECIFIED CAUSE, UNSPECIFIED CHRONICITY, UNSPECIFIED LATERALITY: ICD-10-CM

## 2021-10-06 DIAGNOSIS — I10 ESSENTIAL HYPERTENSION: ICD-10-CM

## 2021-10-06 DIAGNOSIS — Z23 ENCOUNTER FOR IMMUNIZATION: ICD-10-CM

## 2021-10-06 DIAGNOSIS — Z12.11 SCREEN FOR COLON CANCER: Primary | ICD-10-CM

## 2021-10-06 PROCEDURE — 84550 ASSAY OF BLOOD/URIC ACID: CPT | Performed by: INTERNAL MEDICINE

## 2021-10-06 PROCEDURE — 80048 BASIC METABOLIC PNL TOTAL CA: CPT | Performed by: INTERNAL MEDICINE

## 2021-10-06 PROCEDURE — 99214 OFFICE O/P EST MOD 30 MIN: CPT | Mod: 25 | Performed by: INTERNAL MEDICINE

## 2021-10-06 PROCEDURE — 36415 COLL VENOUS BLD VENIPUNCTURE: CPT | Performed by: INTERNAL MEDICINE

## 2021-10-06 PROCEDURE — 90662 IIV NO PRSV INCREASED AG IM: CPT | Performed by: INTERNAL MEDICINE

## 2021-10-06 PROCEDURE — 80061 LIPID PANEL: CPT | Performed by: INTERNAL MEDICINE

## 2021-10-06 PROCEDURE — 90472 IMMUNIZATION ADMIN EACH ADD: CPT | Performed by: INTERNAL MEDICINE

## 2021-10-06 PROCEDURE — 84460 ALANINE AMINO (ALT) (SGPT): CPT | Performed by: INTERNAL MEDICINE

## 2021-10-06 PROCEDURE — 90732 PPSV23 VACC 2 YRS+ SUBQ/IM: CPT | Performed by: INTERNAL MEDICINE

## 2021-10-06 PROCEDURE — 90471 IMMUNIZATION ADMIN: CPT | Performed by: INTERNAL MEDICINE

## 2021-10-06 NOTE — NURSING NOTE
Prior to immunization administration, verified patients identity using patient s name and date of birth. Please see Immunization Activity for additional information.     Screening Questionnaire for Adult Immunization    Are you sick today?   No   Do you have allergies to medications, food, a vaccine component or latex?   No   Have you ever had a serious reaction after receiving a vaccination?   No   Do you have a long-term health problem with heart, lung, kidney, or metabolic disease (e.g., diabetes), asthma, a blood disorder, no spleen, complement component deficiency, a cochlear implant, or a spinal fluid leak?  Are you on long-term aspirin therapy?   No   Do you have cancer, leukemia, HIV/AIDS, or any other immune system problem?   No   Do you have a parent, brother, or sister with an immune system problem?   No   In the past 3 months, have you taken medications that affect  your immune system, such as prednisone, other steroids, or anticancer drugs; drugs for the treatment of rheumatoid arthritis, Crohn s disease, or psoriasis; or have you had radiation treatments?   No   Have you had a seizure, or a brain or other nervous system problem?   No   During the past year, have you received a transfusion of blood or blood    products, or been given immune (gamma) globulin or antiviral drug?   No   For women: Are you pregnant or is there a chance you could become       pregnant during the next month?   No   Have you received any vaccinations in the past 4 weeks?   No     Immunization questionnaire answers were all negative.        Per orders of Dr. Cummins, injection of [id number] and Flu given by Kenya Mccullough. Patient instructed to remain in clinic for 15 minutes afterwards, and to report any adverse reaction to me immediately.       Screening performed by Kenya Mccullough on 10/6/2021 at 5:53 PM.

## 2021-10-06 NOTE — PROGRESS NOTES
"    Assessment & Plan     Screen for colon cancer    - Adult Gastro Ref - Procedure Only; Future    Gout of foot, unspecified cause, unspecified chronicity, unspecified laterality    - Uric acid; Future  - Uric acid    Encounter for immunization    - PPSV23, IM/SUBQ (2+ YRS) - [id number]    Persistent atrial fibrillation (H)    - Lipid Profile  - ALT  - Basic metabolic panel    Essential hypertension    - Lipid Profile  - ALT  - Basic metabolic panel             BMI:   Estimated body mass index is 36.19 kg/m  as calculated from the following:    Height as of 7/1/21: 1.727 m (5' 8\").    Weight as of this encounter: 108 kg (238 lb).   Weight management plan: Discussed healthy diet and exercise guidelines    See Patient Instructions    Return in about 1 year (around 10/6/2022) for Preventive Visit.    Loco Cummins MD  [facility]    Jerri Owen is a 71 year old who presents for the following health issues     HPI     Pt is here to establish care was a pt of Dr. Kay.     Hypertension Follow-up      Do you check your blood pressure regularly outside of the clinic? Yes     Are you following a low salt diet? Yes    Are your blood pressures ever more than 140 on the top number (systolic) OR more   than 90 on the bottom number (diastolic), for example 140/90? No      How many servings of fruits and vegetables do you eat daily?  4 or more    On average, how many sweetened beverages do you drink each day (Examples: soda, juice, sweet tea, etc.  Do NOT count diet or artificially sweetened beverages)?   0    How many days per week do you exercise enough to make your heart beat faster? 7    How many minutes a day do you exercise enough to make your heart beat faster? 60 or more    How many days per week do you miss taking your medication? 0        Review of Systems   Constitutional, HEENT, cardiovascular, pulmonary, GI, , musculoskeletal, neuro, skin, endocrine and psych systems " are negative, except as otherwise noted.      Objective    /80 (BP Location: Left arm, Patient Position: Chair, Cuff Size: Adult Large)   Pulse 71   Temp 98.1  F (36.7  C) (Oral)   Resp 16   Wt 108 kg (238 lb)   SpO2 97%   BMI 36.19 kg/m    Body mass index is 36.19 kg/m .  Physical Exam   GENERAL: healthy, alert and no distress  NECK: no adenopathy, no asymmetry, masses, or scars and thyroid normal to palpation  RESP: lungs clear to auscultation - no rales, rhonchi or wheezes  CV: regular rate and rhythm, normal S1 S2, no S3 or S4, no murmur, click or rub, no peripheral edema and peripheral pulses strong  ABDOMEN: soft, nontender, no hepatosplenomegaly, no masses and bowel sounds normal  MS: no gross musculoskeletal defects noted, no edema

## 2021-10-07 LAB
ALT SERPL W P-5'-P-CCNC: 45 U/L (ref 0–70)
ANION GAP SERPL CALCULATED.3IONS-SCNC: 4 MMOL/L (ref 3–14)
BUN SERPL-MCNC: 25 MG/DL (ref 7–30)
CALCIUM SERPL-MCNC: 8.7 MG/DL (ref 8.5–10.1)
CHLORIDE BLD-SCNC: 107 MMOL/L (ref 94–109)
CHOLEST SERPL-MCNC: 173 MG/DL
CO2 SERPL-SCNC: 28 MMOL/L (ref 20–32)
CREAT SERPL-MCNC: 0.96 MG/DL (ref 0.66–1.25)
FASTING STATUS PATIENT QL REPORTED: NO
GFR SERPL CREATININE-BSD FRML MDRD: 80 ML/MIN/1.73M2
GLUCOSE BLD-MCNC: 80 MG/DL (ref 70–99)
HDLC SERPL-MCNC: 38 MG/DL
LDLC SERPL CALC-MCNC: 103 MG/DL
NONHDLC SERPL-MCNC: 135 MG/DL
POTASSIUM BLD-SCNC: 4.2 MMOL/L (ref 3.4–5.3)
SODIUM SERPL-SCNC: 139 MMOL/L (ref 133–144)
TRIGL SERPL-MCNC: 161 MG/DL
URATE SERPL-MCNC: 5.8 MG/DL (ref 3.5–7.2)

## 2021-10-08 ENCOUNTER — DOCUMENTATION ONLY (OUTPATIENT)
Dept: CARDIOLOGY | Facility: CLINIC | Age: 71
End: 2021-10-08

## 2021-10-08 DIAGNOSIS — E78.5 HLD (HYPERLIPIDEMIA): Primary | ICD-10-CM

## 2021-10-08 NOTE — PROGRESS NOTES
Patient left message on EP nurse line returning call.  Called patient back and had to leave message.  PRABHA Palomo

## 2021-10-08 NOTE — PROGRESS NOTES
Pls call Brayden today or early next week and review blood work    Except for the cholesterol, lab work looks really good!  Normal liver (ALT) and electrolytes, kidney function and blood sugar is all normal.    Cholesterol doesn't look as good as it did back in 6/2021.  Triglycerides (usually a reflection of carbs) are high (goal <150). Good cholesterol (HDL) has dropped and no longer meeting goal of >40 (but very close!) and bad cholesterol (LDL) has gone up more than 10 points.    1) Anything he can think of that would have made these #s change?  Usually it's a change in diet (increased junky carbohydrates, like crackers/chips/desserts/candy/alcohol/sugared soda) and reduction in exercise.    If he can think of anyway to tighten that all back up (to get LDL <100 and Trigs), let's have him do that and he can choose to recheck chol in 3-6 months (pls order).    Cindix - Crystal    Component      Latest Ref Rng & Units 12/7/2020 6/30/2021 10/6/2021   Cholesterol      <200 mg/dL 173 159 173   Triglycerides      <150 mg/dL 102 121 161 (H)   HDL Cholesterol      >=40 mg/dL 39 (L) 44 38 (L)   LDL Cholesterol Calculated      <=100 mg/dL 114 (H) 91 103 (H)   Non HDL Cholesterol      <130 mg/dL 134 (H) 115 135 (H)   Patient Fasting > 8hrs?         No   ALT      0 - 70 U/L   45       Component      Latest Ref Rng & Units 6/30/2021 10/6/2021   Sodium      133 - 144 mmol/L 141 139   Potassium      3.4 - 5.3 mmol/L 3.6 4.2   Chloride      94 - 109 mmol/L 109 107   Carbon Dioxide      20 - 32 mmol/L 27 28   Anion Gap      3 - 14 mmol/L 5 4   Glucose      70 - 99 mg/dL 89 80   Urea Nitrogen      7 - 30 mg/dL 13 25   Creatinine      0.66 - 1.25 mg/dL 1.06 0.96   GFR Estimate      >60 mL/min/1.73m2 71 80   GFR Estimate If Black      >60 mL/min/1.73:m2 82    Calcium      8.5 - 10.1 mg/dL 8.7 8.7

## 2021-10-08 NOTE — PROGRESS NOTES
Called patient and left message asking for a return phone call to discuss lab results and Crystal Campos's questions. Heart clinic phone number provided.

## 2021-10-11 ENCOUNTER — OFFICE VISIT (OUTPATIENT)
Dept: URGENT CARE | Facility: URGENT CARE | Age: 71
End: 2021-10-11
Payer: COMMERCIAL

## 2021-10-11 VITALS
SYSTOLIC BLOOD PRESSURE: 153 MMHG | BODY MASS INDEX: 36.98 KG/M2 | DIASTOLIC BLOOD PRESSURE: 82 MMHG | TEMPERATURE: 97.9 F | HEART RATE: 55 BPM | WEIGHT: 243.2 LBS

## 2021-10-11 DIAGNOSIS — M62.830 SPASM OF BACK MUSCLES: ICD-10-CM

## 2021-10-11 DIAGNOSIS — M53.3 SACROILIAC JOINT PAIN: ICD-10-CM

## 2021-10-11 DIAGNOSIS — R30.0 DYSURIA: Primary | ICD-10-CM

## 2021-10-11 LAB
ALBUMIN UR-MCNC: NEGATIVE MG/DL
APPEARANCE UR: CLEAR
BILIRUB UR QL STRIP: NEGATIVE
COLOR UR AUTO: YELLOW
GLUCOSE UR STRIP-MCNC: NEGATIVE MG/DL
HGB UR QL STRIP: NEGATIVE
KETONES UR STRIP-MCNC: NEGATIVE MG/DL
LEUKOCYTE ESTERASE UR QL STRIP: NEGATIVE
NITRATE UR QL: NEGATIVE
PH UR STRIP: 5 [PH] (ref 5–7)
SP GR UR STRIP: >=1.03 (ref 1–1.03)
UROBILINOGEN UR STRIP-ACNC: 0.2 E.U./DL

## 2021-10-11 PROCEDURE — 81003 URINALYSIS AUTO W/O SCOPE: CPT | Performed by: PHYSICIAN ASSISTANT

## 2021-10-11 PROCEDURE — 99214 OFFICE O/P EST MOD 30 MIN: CPT | Performed by: PHYSICIAN ASSISTANT

## 2021-10-11 RX ORDER — METHOCARBAMOL 750 MG/1
750 TABLET, FILM COATED ORAL 4 TIMES DAILY PRN
Qty: 30 TABLET | Refills: 0 | Status: SHIPPED | OUTPATIENT
Start: 2021-10-11 | End: 2022-02-10

## 2021-10-11 RX ORDER — HYDROCODONE BITARTRATE AND ACETAMINOPHEN 5; 325 MG/1; MG/1
1 TABLET ORAL EVERY 6 HOURS PRN
Qty: 10 TABLET | Refills: 0 | Status: SHIPPED | OUTPATIENT
Start: 2021-10-11 | End: 2021-10-14

## 2021-10-11 NOTE — PROGRESS NOTES
10/11/21 Pt called and LVM on Afib RN line, attempted to call pt but reached his VM. LM and requested callback.  Byron 1000 am

## 2021-10-11 NOTE — PROGRESS NOTES
10/11/21 Spoke w pt and explained results of lab work . Pt states he has been on a very strict diet and exercise regime . He walks 2 miles each am and 2 miles each evening. In addition he will ride his exercise bike 1-2x/day. He eats only grapes for breakfast and a large meal of baked chicken and steamed veggies for lunch. He denies ETOH, sweets or other high fat foods. He drinks only H2O He states the day of the lab draw he did not fast.   He would like to re-do labs in 3 m and have Crystal evaluate.  Order placed and informed him scheduling will call him to schedule.  Pt voiced understanding and agreement with plan.   Byron

## 2021-10-11 NOTE — PROGRESS NOTES
"    Assessment & Plan     Dysuria  UA negative  - UA macro with reflex to Microscopic and Culture - Clinc Collect    Sacroiliac joint pain  Ice and warm moist compresses  vicodin for break through pain, night time sleeping  - HYDROcodone-acetaminophen (NORCO) 5-325 MG tablet; Take 1 tablet by mouth every 6 hours as needed for severe pain    Spasm of back muscles  Robaxin for muscle relaxation  - methocarbamol (ROBAXIN) 750 MG tablet; Take 1 tablet (750 mg) by mouth 4 times daily as needed     BMI:   Estimated body mass index is 36.98 kg/m  as calculated from the following:    Height as of 7/1/21: 1.727 m (5' 8\").    Weight as of this encounter: 110.3 kg (243 lb 3.2 oz).     No follow-ups on file.    Tom Mckeon PA-C  [facility] URGENT CARE KRISTEN Owen is a 71 year old who presents for the following health issues     HPI     Right side SI joint pain  Worse with movements    Review of Systems   Constitutional, HEENT, cardiovascular, pulmonary, gi and gu systems are negative, except as otherwise noted.      Objective    BP (!) 153/82 (BP Location: Right arm, Patient Position: Sitting, Cuff Size: Adult Regular)   Pulse 55   Temp 97.9  F (36.6  C) (Oral)   Wt 110.3 kg (243 lb 3.2 oz)   BMI 36.98 kg/m    Body mass index is 36.98 kg/m .  Physical Exam   GENERAL: healthy, alert and no distress  MS: Positive for tenderness, pain, right SI joint  SKIN: no suspicious lesions or rashes  NEURO: Normal strength and tone, mentation intact and speech normal  PSYCH: mentation appears normal, affect normal/bright                "

## 2021-10-30 ENCOUNTER — HEALTH MAINTENANCE LETTER (OUTPATIENT)
Age: 71
End: 2021-10-30

## 2021-11-02 NOTE — PATIENT INSTRUCTIONS
1. For swelling:   Increase Lasix to 40 mg (2 of the 20 mg tabs) x 5 days (Tues, Wed, Thurs, Friday, Saturday). Double your potassium during these days as well. On Cuate 3/10, decrease to Lasix 20 mg daily and decrease potassium back to normal dose   Wear compression stockings   PUT FEET UP/ELEVATE   Limit eating out and salt intake - aim for <2300 mg   Quit drinking alcohol     2. Echocardiogram to check pumping function.    3. See me back in 1 week with blood test.  CALL IF ISSUES PRIOR!  [phone]       yes

## 2021-11-07 DIAGNOSIS — L65.9 ALOPECIA: ICD-10-CM

## 2021-11-08 RX ORDER — FINASTERIDE 1 MG/1
TABLET, FILM COATED ORAL
Qty: 90 TABLET | Refills: 0 | Status: SHIPPED | OUTPATIENT
Start: 2021-11-08 | End: 2022-02-17

## 2021-11-08 NOTE — TELEPHONE ENCOUNTER
Routing refill request to provider for review/approval because:     Refill request is not for Imiquimod, 5-Fluorouracil, or Finasteride    Thais Hughes RN

## 2021-11-18 DIAGNOSIS — M10.9 GOUT OF FOOT, UNSPECIFIED CAUSE, UNSPECIFIED CHRONICITY, UNSPECIFIED LATERALITY: ICD-10-CM

## 2021-11-18 NOTE — TELEPHONE ENCOUNTER
Routing refill request to provider for review/approval because:  Lab Results   Component Value Date    WBC 9.8 09/21/2020     Lab Results   Component Value Date    RBC 4.47 09/21/2020     Lab Results   Component Value Date    HGB 14.4 09/21/2020     Lab Results   Component Value Date    HCT 44.7 09/21/2020     No components found for: MCT  Lab Results   Component Value Date     09/21/2020     Lab Results   Component Value Date    MCH 32.2 09/21/2020     Lab Results   Component Value Date    MCHC 32.2 09/21/2020     Lab Results   Component Value Date    RDW 13.3 09/21/2020     Lab Results   Component Value Date     09/21/2020         Que Fuller RN  [facility] Triage Nurse

## 2021-11-19 RX ORDER — ALLOPURINOL 300 MG/1
300 TABLET ORAL DAILY
Qty: 90 TABLET | Refills: 0 | Status: SHIPPED | OUTPATIENT
Start: 2021-11-19 | End: 2022-02-17

## 2021-12-22 ENCOUNTER — OFFICE VISIT (OUTPATIENT)
Dept: CARDIOLOGY | Facility: CLINIC | Age: 71
End: 2021-12-22
Payer: COMMERCIAL

## 2021-12-22 VITALS
HEIGHT: 68 IN | DIASTOLIC BLOOD PRESSURE: 95 MMHG | BODY MASS INDEX: 36.95 KG/M2 | SYSTOLIC BLOOD PRESSURE: 149 MMHG | WEIGHT: 243.8 LBS | HEART RATE: 85 BPM

## 2021-12-22 DIAGNOSIS — I48.19 PERSISTENT ATRIAL FIBRILLATION (H): Primary | ICD-10-CM

## 2021-12-22 PROCEDURE — 99214 OFFICE O/P EST MOD 30 MIN: CPT | Performed by: INTERNAL MEDICINE

## 2021-12-22 PROCEDURE — 93000 ELECTROCARDIOGRAM COMPLETE: CPT | Performed by: INTERNAL MEDICINE

## 2021-12-22 ASSESSMENT — MIFFLIN-ST. JEOR: SCORE: 1840.37

## 2021-12-22 NOTE — LETTER
12/22/2021    Loco Cummins MD  [address] [place] [id number]    RE: Mike Schultz       Dear Colleague,     I had the pleasure of seeing Mike Schultz in the [facility].  HPI and Plan:   See dictation  [id number]  Today's clinic visit entailed:  The following tests were independently interpreted by me as noted in my documentation: ecg  15 minutes spent on the date of the encounter doing chart review   Provider  Link to MDM Help Grid     The level of medical decision making during this visit was of moderate complexity.      Orders Placed This Encounter   Procedures     EKG 12-lead complete w/read - Clinics (performed today)     Holter Monitor 24 hour Adult Pediatric     Echocardiogram Complete       No orders of the defined types were placed in this encounter.      There are no discontinued medications.      Encounter Diagnosis   Name Primary?     Persistent atrial fibrillation (H) Yes       CURRENT MEDICATIONS:  Current Outpatient Medications   Medication Sig Dispense Refill     allopurinol (ZYLOPRIM) 300 MG tablet Take 1 tablet (300 mg) by mouth daily 90 tablet 0     cetirizine (ZYRTEC) 10 MG tablet Take 10 mg by mouth as needed for allergies       finasteride (PROPECIA) 1 MG tablet TAKE ONE TABLET BY MOUTH ONE TIME DAILY 90 tablet 0     losartan (COZAAR) 100 MG tablet Take 1 tablet (100 mg) by mouth daily 90 tablet 2     rivaroxaban ANTICOAGULANT (XARELTO ANTICOAGULANT) 20 MG TABS tablet Take 1 tablet (20 mg) by mouth daily (with dinner) 90 tablet 3     methocarbamol (ROBAXIN) 750 MG tablet Take 1 tablet (750 mg) by mouth 4 times daily as needed (Patient not taking: Reported on 12/22/2021) 30 tablet 0     order for DME Equipment ordered: RESMED CPAP Mask type: Full face  Settings: 15 (Patient not taking: Reported on 12/22/2021)         ALLERGIES     Allergies   Allergen Reactions     No Known Drug Allergies        PAST MEDICAL HISTORY:  Past Medical History:   Diagnosis  Date     H/O ETOH abuse      Herpes simplex without mention of complication      Hyperlipidemia      Obesity      Persistent atrial fibrillation (H)     persistent, DCCV 12/2017, ablation 11/6/17     Tobacco use disorder     dced 6/2004       PAST SURGICAL HISTORY:  Past Surgical History:   Procedure Laterality Date     ABLATION OF DYSRHYTHMIC FOCUS  2018, 04/12/2019    Procedure: EP Ablation Focal AFIB;  Surgeon: Fady Day MD;  Location:  HEART CARDIAC CATH LAB     ACHILLES TENDON SURGERY  1997     ANESTHESIA CARDIOVERSION N/A 2/26/2019    Procedure: ANESTHESIA CARDIOVERSION (CONNER);  Surgeon: GENERIC ANESTHESIA PROVIDER;  Location:  OR     ANESTHESIA CARDIOVERSION N/A 11/19/2020    Procedure: ANESTHESIA, FOR CARDIOVERSION (dr campbell);  Surgeon: GENERIC ANESTHESIA PROVIDER;  Location:  OR     BASAL CELL CARCINOMA EXCISION Right 2009    nose, took cartilage from inner ear.     C TOTAL KNEE ARTHROPLASTY Left 03/16/2018    Dr. Malick Suarez     CARDIOVERSION  2012, 2019    cardioversion for atr fib     COLONOSCOPY  2008     EP ABLATION FOCAL AFIB N/A 4/12/2019    Procedure: EP Ablation Focal AFIB;  Surgeon: Fady Day MD;  Location:  HEART CARDIAC CATH LAB     HERNIA REPAIR, UMBILICAL  08/20/2012    Procedure: HERNIORRHAPHY UMBILICAL;  UMBILICAL HERNIA REPAIR;  Surgeon: Ra Crocker MD;  Location: [facility]     HERNIORRHAPHY UMBILICAL  8/20/2012    Procedure: HERNIORRHAPHY UMBILICAL;  UMBILICAL HERNIA REPAIR;  Surgeon: Ra Crocker MD;  Location: [facility]     ORTHOPEDIC SURGERY       ORTHOPEDIC SURGERY       OTHER SURGICAL HISTORY Right 03/2017    total KNEE ARTHROSCOPY      TOTAL KNEE ARTHROPLASTY Left 03/16/2018     ZZ NONSPECIFIC PROCEDURE  1997    achilles tendon     ZZC NONSPECIFIC PROCEDURE      knees, arthroscopy     ZZC NONSPECIFIC PROCEDURE      basal cell skin ca, nose     ZZC NONSPECIFIC PROCEDURE      flex sig; colonoscopy due 3/2008     ZZ NONSPECIFIC PROCEDURE  2012     cardioversion for atr fib       FAMILY HISTORY:  Family History   Problem Relation Age of Onset     Family History Negative Mother      Heart Disease Father         decesased at 42 - MI     Heart Disease Sister          MI     Heart Disease Brother          MI     Heart Disease Brother         CABG AT 49     Lipids Sister      Lipids Sister      Lipids Sister      Family History Negative Brother        SOCIAL HISTORY:  Social History     Socioeconomic History     Marital status: Single     Spouse name: None     Number of children: None     Years of education: None     Highest education level: None   Occupational History     None   Tobacco Use     Smoking status: Former Smoker     Packs/day: 1.00     Years: 10.00     Pack years: 10.00     Types: Cigarettes     Start date:      Quit date: 2011     Years since quitting: 10.3     Smokeless tobacco: Never Used   Substance and Sexual Activity     Alcohol use: No     Drug use: No     Sexual activity: Yes     Partners: Female   Other Topics Concern     Parent/sibling w/ CABG, MI or angioplasty before 65F 55M? Not Asked   Social History Narrative     None     Social Determinants of Health     Financial Resource Strain: Not on file   Food Insecurity: Not on file   Transportation Needs: Not on file   Physical Activity: Not on file   Stress: Not on file   Social Connections: Not on file   Intimate Partner Violence: Not on file   Housing Stability: Not on file       Review of Systems:  Skin:  Negative       Eyes:  Negative      ENT:  Negative      Respiratory:  Negative shortness of breath;dyspnea on exertion;cough     Cardiovascular:  chest pain;palpitations;Negative;edema;dizziness;lightheadedness      Gastroenterology: Negative      Genitourinary:  Negative      Musculoskeletal:  Negative back pain;neck pain    Neurologic:  Negative headaches    Psychiatric:  Negative      Heme/Lymph/Imm:  Negative allergies    Endocrine:  Negative        Physical  "Exam:  Vitals: BP (!) 149/95 (BP Location: Left arm, Patient Position: Sitting)   Pulse 85   Ht 1.727 m (5' 8\")   Wt 110.6 kg (243 lb 12.8 oz)   BMI 37.07 kg/m      Constitutional:  cooperative, alert and oriented, well developed, well nourished, in no acute distress        Skin:  warm and dry to the touch, no apparent skin lesions or masses noted          Head:  normocephalic, no masses or lesions        Eyes:  pupils equal and round, conjunctivae and lids unremarkable, sclera white, no xanthalasma, EOMS intact, no nystagmus        Lymph:No Cervical lymphadenopathy present     ENT:  no pallor or cyanosis        Neck:  carotid pulses are full and equal bilaterally, JVP normal, no carotid bruit        Respiratory:  normal breath sounds, clear to auscultation, normal A-P diameter, normal symmetry, normal respiratory excursion, no use of accessory muscles         Cardiac:   irregularly irregular rhythm   no presence of murmur                                                   GI:  abdomen soft, non-tender, BS normoactive, no mass, no HSM, no bruits obese      Extremities and Muscular Skeletal:  no deformities, clubbing, cyanosis, erythema observed              Neurological:  no gross motor deficits        Psych:  Alert and Oriented x 3        CC  No referring provider defined for this encounter.        Thank you for allowing me to participate in the care of your patient.      Sincerely,     Fady Kent MD     [facility]  cc:   No referring provider defined for this encounter.        "

## 2021-12-22 NOTE — PROGRESS NOTES
Service Date: 12/22/2021    Thank you for allowing me to participate in care of your delightful patient.  As you know, Brayden is a 71-year-old gentleman with a history of recurrent persistent atrial fibrillation, refractory to flecainide and at one point rhythm control was switched to rate control strategy given the questionable symptoms. I saw the patient for a second opinion more than 4 years ago as the patient was having more symptoms.  At that time, I recommended a catheter-based ablation, which was performed in 11/2017 with pulmonary vein isolation.  The patient did well for a few years, until he had more episodes requiring another ablation in 04/2019.  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    The patient has been on a low dose of amiodarone when I saw him last, which was this past January.  At that time, I reduced the dose of  200 mg to 100 mg a day and had him see Crystal Campos, one of our advanced practice providers, this past July. At that time, on my recommendation, I had him stop and reassess.    Since my last visit with him, Brayden continues to do quite well.  He has not drank alcohol for the past year and is trying to lose weight by exercising more.  Recently, he was hiking when he noted his heart rate was in the 70s, normally it would be in the 40s-50s.  During this time; however, he did not feel any difference at all with a slightly faster heart rate.  We asked him to come back today. An  EKG did confirm that he is in atrial fibrillation with a ventricular rate of 70 beats per minute.  He has been on Eliquis all along without interruption.    I am glad that Brayden is doing well without any symptoms and that it is not unexpected to have AFib again, especially after we stopped the  amiodarone.  However, given that the patient had no symptoms and the rate is well controlled on medication, one option is to go back on rate control strategy at this point in time.  I would recommend a Holter to look at his average rate and also obtain an echocardiogram to ensure it continues to be normal.  If both above are within normal range, I would continue rate control strategy and ask that he continue his activity if he can to try to lose more weight.  I will have him see Crystal a year from now.    Fady Day MD        D: 2021   T: 2021   MT: [email or web address]    Name:     TOO HOWARD  MRN:      [id number]        Account:      [id number]   :      1950           Service Date: 2021       Document: [id number]

## 2021-12-22 NOTE — PROGRESS NOTES
HPI and Plan:   See dictation  [id number]  Today's clinic visit entailed:  The following tests were independently interpreted by me as noted in my documentation: ecg  15 minutes spent on the date of the encounter doing chart review   Provider  Link to MDM Help Grid     The level of medical decision making during this visit was of moderate complexity.      Orders Placed This Encounter   Procedures     EKG 12-lead complete w/read - Clinics (performed today)     Holter Monitor 24 hour Adult Pediatric     Echocardiogram Complete       No orders of the defined types were placed in this encounter.      There are no discontinued medications.      Encounter Diagnosis   Name Primary?     Persistent atrial fibrillation (H) Yes       CURRENT MEDICATIONS:  Current Outpatient Medications   Medication Sig Dispense Refill     allopurinol (ZYLOPRIM) 300 MG tablet Take 1 tablet (300 mg) by mouth daily 90 tablet 0     cetirizine (ZYRTEC) 10 MG tablet Take 10 mg by mouth as needed for allergies       finasteride (PROPECIA) 1 MG tablet TAKE ONE TABLET BY MOUTH ONE TIME DAILY 90 tablet 0     losartan (COZAAR) 100 MG tablet Take 1 tablet (100 mg) by mouth daily 90 tablet 2     rivaroxaban ANTICOAGULANT (XARELTO ANTICOAGULANT) 20 MG TABS tablet Take 1 tablet (20 mg) by mouth daily (with dinner) 90 tablet 3     methocarbamol (ROBAXIN) 750 MG tablet Take 1 tablet (750 mg) by mouth 4 times daily as needed (Patient not taking: Reported on 12/22/2021) 30 tablet 0     order for DME Equipment ordered: RESMED CPAP Mask type: Full face  Settings: 15 (Patient not taking: Reported on 12/22/2021)         ALLERGIES     Allergies   Allergen Reactions     No Known Drug Allergies        PAST MEDICAL HISTORY:  Past Medical History:   Diagnosis Date     H/O ETOH abuse      Herpes simplex without mention of complication      Hyperlipidemia      Obesity      Persistent atrial fibrillation (H)     persistent, DCCV 12/2017, ablation 11/6/17     Tobacco use  disorder     dced 6/2004       PAST SURGICAL HISTORY:  Past Surgical History:   Procedure Laterality Date     ABLATION OF DYSRHYTHMIC FOCUS  2018, 04/12/2019    Procedure: EP Ablation Focal AFIB;  Surgeon: Fady Day MD;  Location:  HEART CARDIAC CATH LAB     ACHILLES TENDON SURGERY  1997     ANESTHESIA CARDIOVERSION N/A 2/26/2019    Procedure: ANESTHESIA CARDIOVERSION (CONNER);  Surgeon: GENERIC ANESTHESIA PROVIDER;  Location:  OR     ANESTHESIA CARDIOVERSION N/A 11/19/2020    Procedure: ANESTHESIA, FOR CARDIOVERSION (dr campbell);  Surgeon: GENERIC ANESTHESIA PROVIDER;  Location:  OR     BASAL CELL CARCINOMA EXCISION Right 2009    nose, took cartilage from inner ear.     C TOTAL KNEE ARTHROPLASTY Left 03/16/2018    Dr. Malick Suarez     CARDIOVERSION  2012, 2019    cardioversion for atr fib     COLONOSCOPY  2008     EP ABLATION FOCAL AFIB N/A 4/12/2019    Procedure: EP Ablation Focal AFIB;  Surgeon: Fady Day MD;  Location:  HEART CARDIAC CATH LAB     HERNIA REPAIR, UMBILICAL  08/20/2012    Procedure: HERNIORRHAPHY UMBILICAL;  UMBILICAL HERNIA REPAIR;  Surgeon: Ra Crocker MD;  Location: [facility]     HERNIORRHAPHY UMBILICAL  8/20/2012    Procedure: HERNIORRHAPHY UMBILICAL;  UMBILICAL HERNIA REPAIR;  Surgeon: Ra Crocker MD;  Location: [facility]     ORTHOPEDIC SURGERY       ORTHOPEDIC SURGERY       OTHER SURGICAL HISTORY Right 03/2017    total KNEE ARTHROSCOPY      TOTAL KNEE ARTHROPLASTY Left 03/16/2018     ZZC NONSPECIFIC PROCEDURE  1997    achilles tendon     ZZC NONSPECIFIC PROCEDURE      knees, arthroscopy     ZZC NONSPECIFIC PROCEDURE      basal cell skin ca, nose     ZZC NONSPECIFIC PROCEDURE      flex sig; colonoscopy due 3/2008     ZZC NONSPECIFIC PROCEDURE  2012    cardioversion for atr fib       FAMILY HISTORY:  Family History   Problem Relation Age of Onset     Family History Negative Mother      Heart Disease Father         decesased at 42 - MI     Heart Disease Sister          " MI     Heart Disease Brother          MI     Heart Disease Brother         CABG AT 49     Lipids Sister      Lipids Sister      Lipids Sister      Family History Negative Brother        SOCIAL HISTORY:  Social History     Socioeconomic History     Marital status: Single     Spouse name: None     Number of children: None     Years of education: None     Highest education level: None   Occupational History     None   Tobacco Use     Smoking status: Former Smoker     Packs/day: 1.00     Years: 10.00     Pack years: 10.00     Types: Cigarettes     Start date:      Quit date: 2011     Years since quitting: 10.3     Smokeless tobacco: Never Used   Substance and Sexual Activity     Alcohol use: No     Drug use: No     Sexual activity: Yes     Partners: Female   Other Topics Concern     Parent/sibling w/ CABG, MI or angioplasty before 65F 55M? Not Asked   Social History Narrative     None     Social Determinants of Health     Financial Resource Strain: Not on file   Food Insecurity: Not on file   Transportation Needs: Not on file   Physical Activity: Not on file   Stress: Not on file   Social Connections: Not on file   Intimate Partner Violence: Not on file   Housing Stability: Not on file       Review of Systems:  Skin:  Negative       Eyes:  Negative      ENT:  Negative      Respiratory:  Negative shortness of breath;dyspnea on exertion;cough     Cardiovascular:  chest pain;palpitations;Negative;edema;dizziness;lightheadedness      Gastroenterology: Negative      Genitourinary:  Negative      Musculoskeletal:  Negative back pain;neck pain    Neurologic:  Negative headaches    Psychiatric:  Negative      Heme/Lymph/Imm:  Negative allergies    Endocrine:  Negative        Physical Exam:  Vitals: BP (!) 149/95 (BP Location: Left arm, Patient Position: Sitting)   Pulse 85   Ht 1.727 m (5' 8\")   Wt 110.6 kg (243 lb 12.8 oz)   BMI 37.07 kg/m      Constitutional:  cooperative, alert and oriented, " well developed, well nourished, in no acute distress        Skin:  warm and dry to the touch, no apparent skin lesions or masses noted          Head:  normocephalic, no masses or lesions        Eyes:  pupils equal and round, conjunctivae and lids unremarkable, sclera white, no xanthalasma, EOMS intact, no nystagmus        Lymph:No Cervical lymphadenopathy present     ENT:  no pallor or cyanosis        Neck:  carotid pulses are full and equal bilaterally, JVP normal, no carotid bruit        Respiratory:  normal breath sounds, clear to auscultation, normal A-P diameter, normal symmetry, normal respiratory excursion, no use of accessory muscles         Cardiac:   irregularly irregular rhythm   no presence of murmur                                                   GI:  abdomen soft, non-tender, BS normoactive, no mass, no HSM, no bruits obese      Extremities and Muscular Skeletal:  no deformities, clubbing, cyanosis, erythema observed              Neurological:  no gross motor deficits        Psych:  Alert and Oriented x 3        CC  No referring provider defined for this encounter.

## 2021-12-29 ENCOUNTER — HOSPITAL ENCOUNTER (OUTPATIENT)
Dept: CARDIOLOGY | Facility: CLINIC | Age: 71
Discharge: HOME | End: 2021-12-29
Attending: INTERNAL MEDICINE | Admitting: INTERNAL MEDICINE
Payer: COMMERCIAL

## 2021-12-29 DIAGNOSIS — I48.19 PERSISTENT ATRIAL FIBRILLATION (H): ICD-10-CM

## 2021-12-29 PROCEDURE — 93225 XTRNL ECG REC<48 HRS REC: CPT

## 2021-12-29 PROCEDURE — 93227 XTRNL ECG REC<48 HR R&I: CPT | Performed by: INTERNAL MEDICINE

## 2022-01-10 ENCOUNTER — HOSPITAL ENCOUNTER (OUTPATIENT)
Dept: CARDIOLOGY | Facility: CLINIC | Age: 72
Discharge: HOME | End: 2022-01-10
Attending: INTERNAL MEDICINE | Admitting: INTERNAL MEDICINE
Payer: COMMERCIAL

## 2022-01-10 DIAGNOSIS — I48.19 PERSISTENT ATRIAL FIBRILLATION (H): ICD-10-CM

## 2022-01-10 LAB — LVEF ECHO: NORMAL

## 2022-01-10 PROCEDURE — 93306 TTE W/DOPPLER COMPLETE: CPT | Mod: 26 | Performed by: INTERNAL MEDICINE

## 2022-01-10 PROCEDURE — 93306 TTE W/DOPPLER COMPLETE: CPT

## 2022-01-23 ENCOUNTER — NURSE TRIAGE (OUTPATIENT)
Dept: NURSING | Facility: CLINIC | Age: 72
End: 2022-01-23
Payer: COMMERCIAL

## 2022-01-23 ENCOUNTER — HOSPITAL ENCOUNTER (EMERGENCY)
Facility: CLINIC | Age: 72
Discharge: HOME | End: 2022-01-24
Attending: EMERGENCY MEDICINE | Admitting: EMERGENCY MEDICINE
Payer: COMMERCIAL

## 2022-01-23 ENCOUNTER — APPOINTMENT (OUTPATIENT)
Dept: CT IMAGING | Facility: CLINIC | Age: 72
End: 2022-01-23
Attending: EMERGENCY MEDICINE
Payer: COMMERCIAL

## 2022-01-23 DIAGNOSIS — G51.0 BELL'S PALSY: ICD-10-CM

## 2022-01-23 PROCEDURE — 99284 EMERGENCY DEPT VISIT MOD MDM: CPT | Mod: 25

## 2022-01-23 PROCEDURE — 70450 CT HEAD/BRAIN W/O DYE: CPT

## 2022-01-23 ASSESSMENT — MIFFLIN-ST. JEOR: SCORE: 1877.56

## 2022-01-24 ENCOUNTER — DOCUMENTATION ONLY (OUTPATIENT)
Dept: ADMINISTRATIVE | Facility: CLINIC | Age: 72
End: 2022-01-24
Payer: COMMERCIAL

## 2022-01-24 VITALS
BODY MASS INDEX: 38.19 KG/M2 | DIASTOLIC BLOOD PRESSURE: 98 MMHG | TEMPERATURE: 97.3 F | SYSTOLIC BLOOD PRESSURE: 156 MMHG | HEART RATE: 77 BPM | OXYGEN SATURATION: 94 % | RESPIRATION RATE: 16 BRPM | WEIGHT: 252 LBS | HEIGHT: 68 IN

## 2022-01-24 RX ORDER — VALACYCLOVIR HYDROCHLORIDE 1 G/1
1000 TABLET, FILM COATED ORAL 3 TIMES DAILY
Qty: 21 TABLET | Refills: 0 | Status: SHIPPED | OUTPATIENT
Start: 2022-01-24 | End: 2022-09-08

## 2022-01-24 RX ORDER — PREDNISONE 20 MG/1
60 TABLET ORAL DAILY
Qty: 15 TABLET | Refills: 0 | Status: SHIPPED | OUTPATIENT
Start: 2022-01-24 | End: 2022-01-29

## 2022-01-24 ASSESSMENT — ENCOUNTER SYMPTOMS
WEAKNESS: 0
COUGH: 0
ABDOMINAL PAIN: 0
VOMITING: 0
SHORTNESS OF BREATH: 0
FACIAL ASYMMETRY: 1
NAUSEA: 0
HEADACHES: 0
FEVER: 0
NUMBNESS: 0

## 2022-01-24 NOTE — TELEPHONE ENCOUNTER
Patient states he woke up this morning ~ 9:30am with the right side of his face is weak and he has a hard time closing the right eye, and his mouth is crooked. The left side goes up and the right doesn't. The sx have not changed since this morning. He takes Xarelto for A fib. No other sx noted.    Triaged to a disposition of Call 911 now, Patient refuses to do this and states his wife will bring him in to the ER now.     Zora Vargas RN Triage Nurse Advisor 9:31 PM 1/23/2022  Reason for Disposition    [1] Weakness (i.e., paralysis, loss of muscle strength) of the face, arm / hand, or leg / foot on one side of the body AND [2] sudden onset AND [3] present now    Additional Information    Negative: [1] SEVERE weakness (i.e., unable to walk or barely able to walk, requires support) AND [2] new onset or worsening    Protocols used: NEUROLOGIC DEFICIT-A-AH  COVID 19 Nurse Triage Plan/Patient Instructions    Please be aware that novel coronavirus (COVID-19) may be circulating in the community. If you develop symptoms such as fever, cough, or SOB or if you have concerns about the presence of another infection including coronavirus (COVID-19), please contact your health care provider or visit [email or web address].     Disposition/Instructions    Call to EMS/911 recommended. Follow protocol based instructions.     Bring Your Own Device:  Please also bring your smart device(s) (smart phones, tablets, laptops) and their charging cables for your personal use and to communicate with your care team during your visit.    Thank you for taking steps to prevent the spread of this virus.  o Limit your contact with others.  o Wear a simple mask to cover your cough.  o Wash your hands well and often.    Resources    [facility]: About COVID-19: [email or web address]    CDC: What to Do If You're Sick: www.cdc.gov/coronavirus/2019-ncov/about/steps-when-sick.html    CDC: Ending Home Isolation:  www.cdc.gov/coronavirus/2019-ncov/hcp/disposition-in-home-patients.html     CDC: Caring for Someone: www.cdc.gov/coronavirus/2019-ncov/if-you-are-sick/care-for-someone.html     [facility]: Interim Guidance for Hospital Discharge to Home: [email or web address]/[email or web address]/hcp/hospdischarge.pdf    [facility] clinical trials (COVID-19 research studies): [email or web address]     Below are the COVID-19 hotlines at the [place] [facility] ([facility]). Interpreters are available.   o For health questions: Call 690-897-7373 or 1-519.234.2127 (7 a.m. to 7 p.m.)  o For questions about schools and childcare: Call 536-860-0923 or 1-155.149.3870 (7 a.m. to 7 p.m.)

## 2022-01-24 NOTE — ED TRIAGE NOTES
Since 0900 today pt noticed right facial droop and inabilty to close his right eye or raise eyebrow. Hx of afib. On xarelto.

## 2022-01-24 NOTE — ED PROVIDER NOTES
History     Chief Complaint:  Facial Droop     The history is provided by the patient.      Mike Schultz is a 71 year old male who presents with right sided facial droop.  Pt states he woke up around 0900 with right sided facial droop and difficulty closing his right eye.  He has not had any numbness and no numbness or weakness of his arms or legs.  He denies any rash or fevers.  He is on xeralto for afib.  He denies headache.      Allergies:  No known drug allergies    Medications:    Allopurinol  Cetirizine  Finasteride  Losartan  Methocarbamol  Xarelto    Past Medical History:    Alcohol abuse  Herpes simplex  Hyperlipidemia  Obesity  Persistent atrial fibrillation  Tobacco use disorder  Bilateral carpal tunnel syndrome  Venous insufficiency  Aortic valve stenosis  BOWEN  Ascending aorta dilatation  Gout  Morbid obesity  Hypertension    Past Surgical History:    Ablation of dysrhythmia focus  Achilles tendon surgery  Anesthesia cardioversion x2  Basal cell carcinoma excision, right  Colonoscopy  Hernia repair, umbilical x2  Total knee arthroscopy, right  Total knee arthroplasty, left  Other orthopedic surgery    Family History:    family history includes Family History Negative in his brother and mother; Heart Disease in his brother, brother, father, and sister; Lipids in his sister, sister, and sister.    Social History:   reports that he quit smoking about 10 years ago. His smoking use included cigarettes. He started smoking about 21 years ago. He has a 10.00 pack-year smoking history. He has never used smokeless tobacco. He reports that he does not drink alcohol and does not use drugs.    PCP: Loco Cummins     Review of Systems   Constitutional: Negative for fever.   Eyes: Negative for visual disturbance.   Respiratory: Negative for cough and shortness of breath.    Cardiovascular: Negative for chest pain.   Gastrointestinal: Negative for abdominal pain, nausea and vomiting.   Neurological: Positive  "for facial asymmetry. Negative for weakness, numbness and headaches.   All other systems reviewed and are negative.        Physical Exam     Patient Vitals for the past 24 hrs:   BP Temp Temp src Pulse Resp SpO2 Height Weight   01/24/22 0020 (!) 156/98 -- -- -- -- 94 % -- --   01/24/22 0015 (!) 149/100 -- -- -- -- 94 % -- --   01/24/22 0000 (!) 176/106 -- -- -- -- 94 % -- --   01/23/22 2252 (!) 166/116 -- -- 77 -- 96 % -- --   01/23/22 2232 -- -- -- 86 -- 95 % -- --   01/23/22 2211 (!) 198/97 97.3  F (36.3  C) Temporal 86 16 97 % 1.727 m (5' 8\") 114.3 kg (252 lb)      Physical Exam  General: Appears well-developed and well-nourished.   Head: No signs of trauma.   Mouth/Throat: Oropharynx is clear and moist.   Ears:  Ear canals clear, no rash noted.  Eyes: Conjunctivae are normal. Pupils are equal, round, and reactive to light.   Neck: Normal range of motion. No nuchal rigidity. No cervical adenopathy  CV: Normal rate and regular rhythm.    Resp: Effort normal and breath sounds normal. No respiratory distress.   GI: Soft. There is no tenderness.  No rebound or guarding.  Normal bowel sounds.  No CVA tenderness.  MSK: Normal range of motion. no edema. No Calf tenderness.  Neuro: The patient is alert and oriented to person, place, and time.  PERRLA, EOMI, strength in upper/lower extremities normal and symmetrical. Sensation normal. Speech normal.  Right sided facial droop and inability to raise right eyebrow.   Skin: Skin is warm and dry. No rash noted.   Psych: normal mood and affect. behavior is normal.       Emergency Department Course       Imaging:  Head CT w/o contrast   Final Result   IMPRESSION:   1.  No acute intracranial process.         All imaging results were discussed with the patient who voiced understanding of the findings.    Emergency Department Course:  Past medical records, nursing notes, and vitals reviewed.  I performed an exam of the patient and obtained history, as documented above.    I " rechecked the patient. Findings and plan explained to the Patient. Patient was discharged.    Impression & Plan      Medical Decision Making:  Pt presents with right facial droop which has been present since waking up at 0900 today.  On exam he does have facial droop including inability to raise his right eyebrow.  Pt's symptoms are classic for Bell's Palsy.  He does not have any other neurologic deficits.  Given the pt is on xeralto, I choose to obtain a CT head to ensure no small bleed and this was negative.  I have a low suspicion for this as he is not having a headache or other concerning findings.  His BP was somewhat elevated, but did low as the pt was reassured.  Pt discharged home with precautions and medications for Bell's Palsy and recommendation to follow up with PCP and neurology.          Diagnosis:    ICD-10-CM    1. Bell's palsy  G51.0         Discharge Medications:  New Prescriptions    PREDNISONE (DELTASONE) 20 MG TABLET    Take 3 tablets (60 mg) by mouth daily for 5 days    VALACYCLOVIR (VALTREX) 1000 MG TABLET    Take 1 tablet (1,000 mg) by mouth 3 times daily for 7 days        1/23/2022   Jovani Ozuna MD Bergenstal, John A, MD  01/24/22 0154

## 2022-01-28 ENCOUNTER — TELEPHONE (OUTPATIENT)
Dept: INTERNAL MEDICINE | Facility: CLINIC | Age: 72
End: 2022-01-28
Payer: COMMERCIAL

## 2022-01-28 ENCOUNTER — TRANSCRIBE ORDERS (OUTPATIENT)
Dept: OTHER | Age: 72
End: 2022-01-28
Payer: COMMERCIAL

## 2022-01-28 ENCOUNTER — MYC MEDICAL ADVICE (OUTPATIENT)
Dept: INTERNAL MEDICINE | Facility: CLINIC | Age: 72
End: 2022-01-28
Payer: COMMERCIAL

## 2022-01-28 DIAGNOSIS — I10 ESSENTIAL HYPERTENSION: ICD-10-CM

## 2022-01-28 DIAGNOSIS — G51.0 BELL'S PALSY: Primary | ICD-10-CM

## 2022-01-28 DIAGNOSIS — R47.89 OTHER SPEECH DISTURBANCES: ICD-10-CM

## 2022-01-28 NOTE — TELEPHONE ENCOUNTER
Patient seen in ED on 1/23/2022 for Bell's Palsy. Was referred and appt set up with  [facility]. Pt calling today to request a new neurology referral for second opinion as did not like plan of care with physician at [facility].    Referral pended for [place] neurology. pcp to review.     Yvonne Barton RN

## 2022-01-28 NOTE — TELEPHONE ENCOUNTER
Routing refill request to provider for review/approval because:  BP Readings from Last 3 Encounters:   01/24/22 (!) 156/98   12/22/21 (!) 149/95   10/11/21 (!) 153/82         Que Fuller RN  [facility] Triage Nurse

## 2022-01-31 RX ORDER — LOSARTAN POTASSIUM 100 MG/1
100 TABLET ORAL DAILY
Qty: 90 TABLET | Refills: 2 | Status: SHIPPED | OUTPATIENT
Start: 2022-01-31 | End: 2022-10-19

## 2022-01-31 NOTE — TELEPHONE ENCOUNTER
Patient called in to check status of this, please see [email or web address] messages below     Did explain to patient that we are closed on weekends and mychart messages can take up to 2 business days to have a reply patient verbalized understanding        8:32 AM  I need to get a referral TODAY for Neurologist for my Bell's Palsy condition PLEASE    January 30, 2022    Brayden Schultz  to Loco Cummins MD          12:00 PM  Would like someone to get back to me on this  ASAP please     Brayden Schultz   [phone]    January 28, 2022    Brayden Schultz  to Loco Cummins MD          9:21 PM  I talked to the nurse about getting scheduled for Bell Palsy Therapy.     How long will it take to get referred to a Neurologist?

## 2022-02-07 NOTE — PROGRESS NOTES
"[facility]    I had the pleasure of seeing Brayden when he came for follow up of arrhythmas.  This 71 year old sees Dr. Day for his history of:    1. Now persistent AFib. EF 50-55% 1/2022 - first dx'd in 2012. Failed flecainide (and noted scar on MRI). Underwent PVI and SVC isolation 11/2017. Repeat PVI and CTI for inducible AFlutter 4/2019. Started on Amio 11/11 with DCCV 11/19/2020 for recurrent AFib in setting of increased alcohol use.   Amiodarone decreased 1/2021; back in persistent AFib 12/2021 and amiodarone stopped  2. Mild-moderate Aortic Stenosis - echo 1/2022 with mean gradient 12.7 mmHg  3. Benign Essential HTN   4. BOWEN   5. Myocardiac Scar noted on cardiac MRI 10/2017, consistent with prior MI in the left Cx territory. EF 51%.  Avoiding flecainide use  6. H/o Alcohol dependence - no EtOH since 2017  7. Chronic AC  On Xarelto for CHADSVASc 2 (HTN, age)     I saw Brayden in 7/2021 at which time he'd lost 30# and was feeling 'great.\"  He remained on low dose amiodarone 100 mg daily without recurrent AFib. HR remained very low in the 40s. He remained off EtOH.  I stopped amiodarone and given normalization of EF and no edema, stopped Lasix 20/KCl 20 mEq daily. D/t concern for gum issues, we stopped amlodipine 2.5 mg daily.  6 m follow-up rec'd.    He contacted our office 12/2021 as HR was up to 70s while hiking. He was feeling \"really good\" despite EKG confirming recurrent AFib.  He saw Dr. Day and they discussed continued rate control strategy. Follow-up Holter showed AFib with avg HR 81 bpm on no AVN blocking agents. Echo with EF 50-55%    He ended up being dx'd with Bell's Palsy in ER 1/23/2022. He was given Prednisone 60 mg x 5 days and Valacyclovir 1000 mg TID x 1 week. CT Head was negative for bleed.    Interval History:  He has been feeling well since last appointment. Still in Afib but asymptomatic. HR typically in 70-80s at rest.  Denies any chest pain, heart palpitations, " "shortness of breath, or swelling of lower extremities.     Has gained 15 lbs but feels weight gain is related to increased night snacking rather than fluid overload and plans to cut down. Monitoring weight very closely at home. Continues to exercise daily, getting HR up to 120s at max with activity.     Noted elevated BP at today's visit (172/95 and 170/90 on recheck). Brought in BP cuff which appears to record systolic pressures 10-20 mmHg below his actual BP.     VITALS:  Vitals: BP (!) 172/95   Pulse 76   Ht 1.727 m (5' 7.99\")   Wt 115.2 kg (254 lb)   BMI 38.63 kg/m      Diagnostic Testing:  Echo 1/10/35544 showed EF 50-55% LVSF mildly reduced. No RWMA. Nl RV. LA mildly dilated 4.1 cm with volume index 35.7ml/m2. Nl MV. Trace TR. Trace AI. MARILYN 1.4 cm2 with mean gradient 12.7mmHg c/w mild-mod AoS. Asc aorta mildly dilated 4.1 cm  Holter Monitor 12/29/2021 on no AVn blocking agents showed AFib with avg HR 81 bpm (range 44--143 bpm). 2% PVC burden  EKG 7/2021 showed sinus bradycardia 45 bpm.  Nonspecific ST-T wave changes  Echocardiogram 3/2019 showed EF of 55-60%.  No regional wall motion abnormalities were noted.  He had 1+ mitral regurgitation and 1+ tricuspid regurgitation.  RV was normal in structure, function and size.  Aortic valve showed just trace to mild aortic regurgitation with aortic sclerosis.  Mean gradient was 4.6 mmHg (improved from previous) volume index is 46.7 mL/m  with LA dimension of 4.9 cm.  Cardiac MRI 10/2017 showed an EF of 51% with mild hypokinesis involving the basal inferolateral segment.  Evidence of previous myocardial infarction in the circumflex territory was noted.      Plan:  1. Starting hydrochlorothiazide today for BP control.   2. Will return for non-fasting blood work in 7-10 days to check kidney function and electrolytes  2. Check and record BP daily  4. Will revaluate need for increased HCTZ dose depending on home BP readings when I call with lab " results    Assessment/Plan:    1. Persistent AFib; EF 50-55% 1/2022    Noted to have higher HRs while hiking and EKG confirmed he was back in AFib. Likely d/t stopping amiodarone d/t HR in 40-50s as well as untreated sleep apnea. Confirms that he is not drinking.     No sxs this time (typically would get fluid retention)    24 hour Holter 12/2021 on no meds showed good avg HR 81 bpm. Echo showed EF 50-55%     Remains on Xarelto for CHADSVASc 2 (HTN, age).    HR is 70-80s at rest at home    PLAN:    Continue Xarelto 20 mg daily    Will allow patient to remain in Afib at this time as he is not symptomatic and HR is controlled. May consider DCCV with need to restart amiodarone if change in symptoms.    2. Aortic Stenosis    Mild-moderate on echo 1/2022, with mean gradient 12.7 mmHg and MARILYN 1.4 cm2. Aorta with mild root dilation.     PLAN:    Monitor; will likely check again next year    3. Essential Hypertension    /95 today, 170/90 on recheck    On max dose of losartan    Monitors BP at home, although home BP cuff appears to record systolic pressures 10-20 mmHg below his actual BP    PLAN:    Start hydrochlorothiazide 12.5 mg (0.5 tablet) PO daily x 1 week, then increase to 25 mg daily depending on blood work    Continue to monitor and record BP at home, continue daily exercise habits    Return for non-fasting blood work in 7-10 days to evaluate kidney function and electrolytes    Will discuss need for increased hydrochlorothiazide dose based on home BP readings in 1-2 weeks    Continue losartan 100 mg daily    4. [place] Palsy    Diagnosed on 1/23/2022    Head CT and MRI unremarkable    Continued right sided facial paralysis despite completed course of Prednisone 60 mg x 5 days and Valacyclovir 1000 mg TID x 1 week    PLAN:    Continue to monitor       I, Natalie Campos, was present with the medical/OTTO student who participated in the service and in the documentation of the note.  I have verified the  history and personally performed the physical exam and medical decision making.  I agree with the assessment and plan of care as documented in the note.       Items personally reviewed: vitals, labs, Holter monitor and agree with the interpretation documented in the note, spirometry report and agree with the interpretation documented in the note and the assessment and plan as above.     LUKE SampsonS  Crystal Campos PA-C, MSPAS        Orders Placed This Encounter   Procedures     Basic metabolic panel     Orders Placed This Encounter   Medications     hydrochlorothiazide (HYDRODIURIL) 25 MG tablet     Sig: Take 0.5 tablets (12.5 mg) by mouth daily X 1 week, then increase to 25 mg daily     Dispense:  30 tablet     Refill:  5     Medications Discontinued During This Encounter   Medication Reason     methocarbamol (ROBAXIN) 750 MG tablet Therapy completed     order for DME Stopped by Patient         Encounter Diagnoses   Name Primary?     Persistent atrial fibrillation (H)      Essential hypertension        CURRENT MEDICATIONS:  Current Outpatient Medications   Medication Sig Dispense Refill     allopurinol (ZYLOPRIM) 300 MG tablet Take 1 tablet (300 mg) by mouth daily 90 tablet 0     cetirizine (ZYRTEC) 10 MG tablet Take 10 mg by mouth as needed for allergies       finasteride (PROPECIA) 1 MG tablet TAKE ONE TABLET BY MOUTH ONE TIME DAILY 90 tablet 0     hydrochlorothiazide (HYDRODIURIL) 25 MG tablet Take 0.5 tablets (12.5 mg) by mouth daily X 1 week, then increase to 25 mg daily 30 tablet 5     losartan (COZAAR) 100 MG tablet Take 1 tablet (100 mg) by mouth daily. 90 tablet 2     rivaroxaban ANTICOAGULANT (XARELTO ANTICOAGULANT) 20 MG TABS tablet Take 1 tablet (20 mg) by mouth daily (with dinner) 90 tablet 3     valACYclovir (VALTREX) 1000 mg tablet Take 1 tablet (1,000 mg) by mouth 3 times daily for 7 days 21 tablet 0       ALLERGIES     Allergies   Allergen Reactions     No Known Drug Allergies   "        Review of Systems:  Skin:  Negative for rash;bruising   Eyes:  not assessed    ENT:  not assessed    Respiratory:  Negative for shortness of breath;sleep apnea  Cardiovascular:  Negative for;palpitations;chest pain;edema;exercise intolerance    Gastroenterology: not assessed    Genitourinary:  not assessed    Musculoskeletal:  Negative for muscular weakness  Neurologic:  Positive for    Psychiatric:  not assessed    Heme/Lymph/Imm:  not assessed    Endocrine:  not assessed      Physical Exam:  Vitals: BP (!) 172/95   Pulse 76   Ht 1.727 m (5' 7.99\")   Wt 115.2 kg (254 lb)   BMI 38.63 kg/m      Constitutional:  cooperative, alert and oriented, well developed, well nourished, in no acute distress        Skin:  warm and dry to the touch, no apparent skin lesions or masses noted        Head:  normocephalic, no masses or lesions        Neck:  not assessed this visit        Chest:  normal breath sounds, clear to auscultation, normal A-P diameter, normal symmetry, normal respiratory excursion, no use of accessory muscles        Cardiac: normal S1 and S2 irregularly irregular rhythm   no presence of murmur   systolic ejection murmur        Abdomen:  not assessed this visit        Vascular: pulses full and equal                                      Extremities and Back:  no deformities, clubbing, cyanosis, erythema observed;no edema;normal muscle strength and tone        Neurological:  affect appropriate   Right sided facial drooping present.        PAST MEDICAL HISTORY:  Past Medical History:   Diagnosis Date     H/O ETOH abuse      Herpes simplex without mention of complication      Hyperlipidemia      Obesity      Persistent atrial fibrillation (H)     persistent, DCCV 12/2017, ablation 11/6/17     Tobacco use disorder     dced 6/2004       PAST SURGICAL HISTORY:  Past Surgical History:   Procedure Laterality Date     ABLATION OF DYSRHYTHMIC FOCUS  2018, 04/12/2019    Procedure: EP Ablation Focal AFIB;  " Surgeon: Fady Day MD;  Location:  [facility]     ACHILLES TENDON SURGERY       ANESTHESIA CARDIOVERSION N/A 2019    Procedure: ANESTHESIA CARDIOVERSION (CONNER);  Surgeon: GENERIC ANESTHESIA PROVIDER;  Location:  OR     ANESTHESIA CARDIOVERSION N/A 2020    Procedure: ANESTHESIA, FOR CARDIOVERSION (dr campbell);  Surgeon: GENERIC ANESTHESIA PROVIDER;  Location:  OR     BASAL CELL CARCINOMA EXCISION Right 2009    nose, took cartilage from inner ear.     CARDIOVERSION  2019    cardioversion for atr fib     COLONOSCOPY       EP ABLATION FOCAL AFIB N/A 2019    Procedure: EP Ablation Focal AFIB;  Surgeon: Fady Day MD;  Location:  [facility]     HERNIA REPAIR, UMBILICAL  2012    Procedure: HERNIORRHAPHY UMBILICAL;  UMBILICAL HERNIA REPAIR;  Surgeon: Ra Crocker MD;  Location: [facility]     HERNIORRHAPHY UMBILICAL  2012    Procedure: HERNIORRHAPHY UMBILICAL;  UMBILICAL HERNIA REPAIR;  Surgeon: Ra Crocker MD;  Location: [facility]     ORTHOPEDIC SURGERY       ORTHOPEDIC SURGERY       OTHER SURGICAL HISTORY Right 2017    total KNEE ARTHROSCOPY      TOTAL KNEE ARTHROPLASTY Left 2018     ZZC NONSPECIFIC PROCEDURE      achilles tendon     ZZC NONSPECIFIC PROCEDURE      knees, arthroscopy     ZZC NONSPECIFIC PROCEDURE      basal cell skin ca, nose     ZZC NONSPECIFIC PROCEDURE      flex sig; colonoscopy due 3/2008     ZZC NONSPECIFIC PROCEDURE      cardioversion for atr fib     ZZC TOTAL KNEE ARTHROPLASTY Left 2018    Dr. Malick Suarez       FAMILY HISTORY:  Family History   Problem Relation Age of Onset     Family History Negative Mother      Heart Disease Father         decesased at 42 - MI     Heart Disease Sister          MI     Heart Disease Brother          MI     Heart Disease Brother         CABG AT 49     Lipids Sister      Lipids Sister      Lipids Sister      Family History Negative Brother         SOCIAL HISTORY:  Social History     Socioeconomic History     Marital status: Single     Spouse name: None     Number of children: None     Years of education: None     Highest education level: None   Occupational History     None   Tobacco Use     Smoking status: Former Smoker     Packs/day: 1.00     Years: 10.00     Pack years: 10.00     Types: Cigarettes     Start date: 2001     Quit date: 8/20/2011     Years since quitting: 10.4     Smokeless tobacco: Never Used   Substance and Sexual Activity     Alcohol use: No     Drug use: No     Sexual activity: Yes     Partners: Female   Other Topics Concern     Parent/sibling w/ CABG, MI or angioplasty before 65F 55M? Not Asked   Social History Narrative     None     Social Determinants of Health     Financial Resource Strain: Not on file   Food Insecurity: Not on file   Transportation Needs: Not on file   Physical Activity: Not on file   Stress: Not on file   Social Connections: Not on file   Intimate Partner Violence: Not on file   Housing Stability: Not on file

## 2022-02-10 ENCOUNTER — OFFICE VISIT (OUTPATIENT)
Dept: CARDIOLOGY | Facility: CLINIC | Age: 72
End: 2022-02-10
Attending: PHYSICIAN ASSISTANT
Payer: COMMERCIAL

## 2022-02-10 VITALS
BODY MASS INDEX: 38.49 KG/M2 | WEIGHT: 254 LBS | DIASTOLIC BLOOD PRESSURE: 95 MMHG | HEIGHT: 68 IN | HEART RATE: 76 BPM | SYSTOLIC BLOOD PRESSURE: 172 MMHG

## 2022-02-10 DIAGNOSIS — I48.19 PERSISTENT ATRIAL FIBRILLATION (H): ICD-10-CM

## 2022-02-10 DIAGNOSIS — I10 ESSENTIAL HYPERTENSION: ICD-10-CM

## 2022-02-10 PROCEDURE — 99214 OFFICE O/P EST MOD 30 MIN: CPT | Performed by: PHYSICIAN ASSISTANT

## 2022-02-10 RX ORDER — HYDROCHLOROTHIAZIDE 25 MG/1
12.5 TABLET ORAL DAILY
Qty: 30 TABLET | Refills: 5 | Status: SHIPPED | OUTPATIENT
Start: 2022-02-10 | End: 2022-03-08

## 2022-02-10 ASSESSMENT — MIFFLIN-ST. JEOR: SCORE: 1886.51

## 2022-02-10 NOTE — LETTER
"2/10/2022    Loco Cummins MD  [address] [place] [id number]    RE: Mike Schultz       Dear Colleague,     I had the pleasure of seeing Mike BRAXTON ClarkAntoine in the [facility].  [facility]    I had the pleasure of seeing Brayden when he came for follow up of arrhythmas.  This 71 year old sees Dr. Day for his history of:    1. Now persistent AFib. EF 50-55% 1/2022 - first dx'd in 2012. Failed flecainide (and noted scar on MRI). Underwent PVI and SVC isolation 11/2017. Repeat PVI and CTI for inducible AFlutter 4/2019. Started on Amio 11/11 with DCCV 11/19/2020 for recurrent AFib in setting of increased alcohol use.   Amiodarone decreased 1/2021; back in persistent AFib 12/2021 and amiodarone stopped  2. Mild-moderate Aortic Stenosis - echo 1/2022 with mean gradient 12.7 mmHg  3. Benign Essential HTN   4. BOWEN   5. Myocardiac Scar noted on cardiac MRI 10/2017, consistent with prior MI in the left Cx territory. EF 51%.  Avoiding flecainide use  6. H/o Alcohol dependence - no EtOH since 2017  7. Chronic AC  On Xarelto for CHADSVASc 2 (HTN, age)     I saw Brayden in 7/2021 at which time he'd lost 30# and was feeling 'great.\"  He remained on low dose amiodarone 100 mg daily without recurrent AFib. HR remained very low in the 40s. He remained off EtOH.  I stopped amiodarone and given normalization of EF and no edema, stopped Lasix 20/KCl 20 mEq daily. D/t concern for gum issues, we stopped amlodipine 2.5 mg daily.  6 m follow-up rec'd.    He contacted our office 12/2021 as HR was up to 70s while hiking. He was feeling \"really good\" despite EKG confirming recurrent AFib.  He saw Dr. Day and they discussed continued rate control strategy. Follow-up Holter showed AFib with avg HR 81 bpm on no AVN blocking agents. Echo with EF 50-55%    He ended up being dx'd with Bell's Palsy in ER 1/23/2022. He was given Prednisone 60 mg x 5 days and Valacyclovir 1000 mg TID x 1 week. CT Head " "was negative for bleed.    Interval History:  He has been feeling well since last appointment. Still in Afib but asymptomatic. HR typically in 70-80s at rest.  Denies any chest pain, heart palpitations, shortness of breath, or swelling of lower extremities.     Has gained 15 lbs but feels weight gain is related to increased night snacking rather than fluid overload and plans to cut down. Monitoring weight very closely at home. Continues to exercise daily, getting HR up to 120s at max with activity.     Noted elevated BP at today's visit (172/95 and 170/90 on recheck). Brought in BP cuff which appears to record systolic pressures 10-20 mmHg below his actual BP.     VITALS:  Vitals: BP (!) 172/95   Pulse 76   Ht 1.727 m (5' 7.99\")   Wt 115.2 kg (254 lb)   BMI 38.63 kg/m      Diagnostic Testing:  Echo 1/10/67661 showed EF 50-55% LVSF mildly reduced. No RWMA. Nl RV. LA mildly dilated 4.1 cm with volume index 35.7ml/m2. Nl MV. Trace TR. Trace AI. MARILYN 1.4 cm2 with mean gradient 12.7mmHg c/w mild-mod AoS. Asc aorta mildly dilated 4.1 cm  Holter Monitor 12/29/2021 on no AVn blocking agents showed AFib with avg HR 81 bpm (range 44--143 bpm). 2% PVC burden  EKG 7/2021 showed sinus bradycardia 45 bpm.  Nonspecific ST-T wave changes  Echocardiogram 3/2019 showed EF of 55-60%.  No regional wall motion abnormalities were noted.  He had 1+ mitral regurgitation and 1+ tricuspid regurgitation.  RV was normal in structure, function and size.  Aortic valve showed just trace to mild aortic regurgitation with aortic sclerosis.  Mean gradient was 4.6 mmHg (improved from previous) volume index is 46.7 mL/m  with LA dimension of 4.9 cm.  Cardiac MRI 10/2017 showed an EF of 51% with mild hypokinesis involving the basal inferolateral segment.  Evidence of previous myocardial infarction in the circumflex territory was noted.      Plan:  1. Starting hydrochlorothiazide today for BP control.   2. Will return for non-fasting blood work in " 7-10 days to check kidney function and electrolytes  2. Check and record BP daily  4. Will revaluate need for increased HCTZ dose depending on home BP readings when I call with lab results    Assessment/Plan:    1. Persistent AFib; EF 50-55% 1/2022    Noted to have higher HRs while hiking and EKG confirmed he was back in AFib. Likely d/t stopping amiodarone d/t HR in 40-50s as well as untreated sleep apnea. Confirms that he is not drinking.     No sxs this time (typically would get fluid retention)    24 hour Holter 12/2021 on no meds showed good avg HR 81 bpm. Echo showed EF 50-55%     Remains on Xarelto for CHADSVASc 2 (HTN, age).    HR is 70-80s at rest at home    PLAN:    Continue Xarelto 20 mg daily    Will allow patient to remain in Afib at this time as he is not symptomatic and HR is controlled. May consider DCCV with need to restart amiodarone if change in symptoms.    2. Aortic Stenosis    Mild-moderate on echo 1/2022, with mean gradient 12.7 mmHg and MARILYN 1.4 cm2. Aorta with mild root dilation.     PLAN:    Monitor; will likely check again next year    3. Essential Hypertension    /95 today, 170/90 on recheck    On max dose of losartan    Monitors BP at home, although home BP cuff appears to record systolic pressures 10-20 mmHg below his actual BP    PLAN:    Start hydrochlorothiazide 12.5 mg (0.5 tablet) PO daily x 1 week, then increase to 25 mg daily depending on blood work    Continue to monitor and record BP at home, continue daily exercise habits    Return for non-fasting blood work in 7-10 days to evaluate kidney function and electrolytes    Will discuss need for increased hydrochlorothiazide dose based on home BP readings in 1-2 weeks    Continue losartan 100 mg daily    4. [place] Palsy    Diagnosed on 1/23/2022    Head CT and MRI unremarkable    Continued right sided facial paralysis despite completed course of Prednisone 60 mg x 5 days and Valacyclovir 1000 mg TID x 1 week    PLAN:    Continue  to monitor       I, Natalie Campos, was present with the medical/OTTO student who participated in the service and in the documentation of the note.  I have verified the history and personally performed the physical exam and medical decision making.  I agree with the assessment and plan of care as documented in the note.       Items personally reviewed: vitals, labs, Holter monitor and agree with the interpretation documented in the note, spirometry report and agree with the interpretation documented in the note and the assessment and plan as above.     LUKE SampsonS  Crystal Campos PA-C, MSPAS        Orders Placed This Encounter   Procedures     Basic metabolic panel     Orders Placed This Encounter   Medications     hydrochlorothiazide (HYDRODIURIL) 25 MG tablet     Sig: Take 0.5 tablets (12.5 mg) by mouth daily X 1 week, then increase to 25 mg daily     Dispense:  30 tablet     Refill:  5     Medications Discontinued During This Encounter   Medication Reason     methocarbamol (ROBAXIN) 750 MG tablet Therapy completed     order for DME Stopped by Patient         Encounter Diagnoses   Name Primary?     Persistent atrial fibrillation (H)      Essential hypertension        CURRENT MEDICATIONS:  Current Outpatient Medications   Medication Sig Dispense Refill     allopurinol (ZYLOPRIM) 300 MG tablet Take 1 tablet (300 mg) by mouth daily 90 tablet 0     cetirizine (ZYRTEC) 10 MG tablet Take 10 mg by mouth as needed for allergies       finasteride (PROPECIA) 1 MG tablet TAKE ONE TABLET BY MOUTH ONE TIME DAILY 90 tablet 0     hydrochlorothiazide (HYDRODIURIL) 25 MG tablet Take 0.5 tablets (12.5 mg) by mouth daily X 1 week, then increase to 25 mg daily 30 tablet 5     losartan (COZAAR) 100 MG tablet Take 1 tablet (100 mg) by mouth daily. 90 tablet 2     rivaroxaban ANTICOAGULANT (XARELTO ANTICOAGULANT) 20 MG TABS tablet Take 1 tablet (20 mg) by mouth daily (with dinner) 90 tablet 3     valACYclovir (VALTREX)  "1000 mg tablet Take 1 tablet (1,000 mg) by mouth 3 times daily for 7 days 21 tablet 0       ALLERGIES     Allergies   Allergen Reactions     No Known Drug Allergies          Review of Systems:  Skin:  Negative for rash;bruising   Eyes:  not assessed    ENT:  not assessed    Respiratory:  Negative for shortness of breath;sleep apnea  Cardiovascular:  Negative for;palpitations;chest pain;edema;exercise intolerance    Gastroenterology: not assessed    Genitourinary:  not assessed    Musculoskeletal:  Negative for muscular weakness  Neurologic:  Positive for    Psychiatric:  not assessed    Heme/Lymph/Imm:  not assessed    Endocrine:  not assessed      Physical Exam:  Vitals: BP (!) 172/95   Pulse 76   Ht 1.727 m (5' 7.99\")   Wt 115.2 kg (254 lb)   BMI 38.63 kg/m      Constitutional:  cooperative, alert and oriented, well developed, well nourished, in no acute distress        Skin:  warm and dry to the touch, no apparent skin lesions or masses noted        Head:  normocephalic, no masses or lesions        Neck:  not assessed this visit        Chest:  normal breath sounds, clear to auscultation, normal A-P diameter, normal symmetry, normal respiratory excursion, no use of accessory muscles        Cardiac: normal S1 and S2 irregularly irregular rhythm   no presence of murmur   systolic ejection murmur        Abdomen:  not assessed this visit        Vascular: pulses full and equal                                      Extremities and Back:  no deformities, clubbing, cyanosis, erythema observed;no edema;normal muscle strength and tone        Neurological:  affect appropriate   Right sided facial drooping present.        PAST MEDICAL HISTORY:  Past Medical History:   Diagnosis Date     H/O ETOH abuse      Herpes simplex without mention of complication      Hyperlipidemia      Obesity      Persistent atrial fibrillation (H)     persistent, DCCV 12/2017, ablation 11/6/17     Tobacco use disorder     dced 6/2004       PAST " SURGICAL HISTORY:  Past Surgical History:   Procedure Laterality Date     ABLATION OF DYSRHYTHMIC FOCUS  , 2019    Procedure: EP Ablation Focal AFIB;  Surgeon: Fady Day MD;  Location:  HEART CARDIAC CATH LAB     ACHILLES TENDON SURGERY       ANESTHESIA CARDIOVERSION N/A 2019    Procedure: ANESTHESIA CARDIOVERSION (CONNER);  Surgeon: GENERIC ANESTHESIA PROVIDER;  Location:  OR     ANESTHESIA CARDIOVERSION N/A 2020    Procedure: ANESTHESIA, FOR CARDIOVERSION (dr campbell);  Surgeon: GENERIC ANESTHESIA PROVIDER;  Location:  OR     BASAL CELL CARCINOMA EXCISION Right     nose, took cartilage from inner ear.     CARDIOVERSION  2019    cardioversion for atr fib     COLONOSCOPY       EP ABLATION FOCAL AFIB N/A 2019    Procedure: EP Ablation Focal AFIB;  Surgeon: Fady Day MD;  Location:  HEART CARDIAC CATH LAB     HERNIA REPAIR, UMBILICAL  2012    Procedure: HERNIORRHAPHY UMBILICAL;  UMBILICAL HERNIA REPAIR;  Surgeon: Ra Crocker MD;  Location: [facility]     HERNIORRHAPHY UMBILICAL  2012    Procedure: HERNIORRHAPHY UMBILICAL;  UMBILICAL HERNIA REPAIR;  Surgeon: Ra Crocker MD;  Location: [facility]     ORTHOPEDIC SURGERY       ORTHOPEDIC SURGERY       OTHER SURGICAL HISTORY Right 2017    total KNEE ARTHROSCOPY      TOTAL KNEE ARTHROPLASTY Left 2018     ZZC NONSPECIFIC PROCEDURE      achilles tendon     ZZC NONSPECIFIC PROCEDURE      knees, arthroscopy     ZZC NONSPECIFIC PROCEDURE      basal cell skin ca, nose     ZZC NONSPECIFIC PROCEDURE      flex sig; colonoscopy due 3/2008     ZZC NONSPECIFIC PROCEDURE      cardioversion for atr fib     ZZC TOTAL KNEE ARTHROPLASTY Left 2018    Dr. Malick Suarez       FAMILY HISTORY:  Family History   Problem Relation Age of Onset     Family History Negative Mother      Heart Disease Father         decesased at 42 - MI     Heart Disease Sister          MI     Heart Disease Brother           MI     Heart Disease Brother         CABG AT 49     Lipids Sister      Lipids Sister      Lipids Sister      Family History Negative Brother        SOCIAL HISTORY:  Social History     Socioeconomic History     Marital status: Single     Spouse name: None     Number of children: None     Years of education: None     Highest education level: None   Occupational History     None   Tobacco Use     Smoking status: Former Smoker     Packs/day: 1.00     Years: 10.00     Pack years: 10.00     Types: Cigarettes     Start date:      Quit date: 2011     Years since quitting: 10.4     Smokeless tobacco: Never Used   Substance and Sexual Activity     Alcohol use: No     Drug use: No     Sexual activity: Yes     Partners: Female   Other Topics Concern     Parent/sibling w/ CABG, MI or angioplasty before 65F 55M? Not Asked   Social History Narrative     None     Social Determinants of Health     Financial Resource Strain: Not on file   Food Insecurity: Not on file   Transportation Needs: Not on file   Physical Activity: Not on file   Stress: Not on file   Social Connections: Not on file   Intimate Partner Violence: Not on file   Housing Stability: Not on file         Thank you for allowing me to participate in the care of your patient.      Sincerely,     Natalie Campos PA-C

## 2022-02-10 NOTE — PATIENT INSTRUCTIONS
Brayden - it was good to see you today!    1. Reviewed that you've still done well with atrial fibrillation with no evidence of the fluid overload that you used to get.  2. Holter showed good heart rate control in 80s    PLAN:  1. Need to get better BP control   Start hydrochlorothiazide daily. Start with 1/2 tablet (12.5 mg) daily in AM    2. Check BP daily and write down  3. In 7-10 days get non-fasting blood work. When call with results, will get update on BP and see if we need to increase to full tablet    Mervat Hidalgo & Caitlin: [phone]

## 2022-02-16 DIAGNOSIS — M10.9 GOUT OF FOOT, UNSPECIFIED CAUSE, UNSPECIFIED CHRONICITY, UNSPECIFIED LATERALITY: ICD-10-CM

## 2022-02-16 DIAGNOSIS — L65.9 ALOPECIA: ICD-10-CM

## 2022-02-17 RX ORDER — FINASTERIDE 1 MG/1
TABLET, FILM COATED ORAL
Qty: 90 TABLET | Refills: 0 | Status: SHIPPED | OUTPATIENT
Start: 2022-02-17 | End: 2022-05-23

## 2022-02-17 RX ORDER — ALLOPURINOL 300 MG/1
300 TABLET ORAL DAILY
Qty: 90 TABLET | Refills: 0 | Status: SHIPPED | OUTPATIENT
Start: 2022-02-17 | End: 2022-06-09

## 2022-02-17 NOTE — TELEPHONE ENCOUNTER
Routing refill request to provider for review/approval because:  Miscellaneous Dermatologic Agents Failed 02/16/2022 09:11 PM   Protocol Details  Refill request is not for Imiquimod, 5-Fluorouracil, or Finasteride           CBC order placed per Allopurinol protocol  Also routing to TC - Please contact patient to schedule lab-only appointment.

## 2022-02-28 ENCOUNTER — LAB (OUTPATIENT)
Dept: LAB | Facility: CLINIC | Age: 72
End: 2022-02-28
Payer: COMMERCIAL

## 2022-02-28 ENCOUNTER — DOCUMENTATION ONLY (OUTPATIENT)
Dept: CARDIOLOGY | Facility: CLINIC | Age: 72
End: 2022-02-28

## 2022-02-28 DIAGNOSIS — I10 ESSENTIAL HYPERTENSION: ICD-10-CM

## 2022-02-28 DIAGNOSIS — M10.9 GOUT OF FOOT, UNSPECIFIED CAUSE, UNSPECIFIED CHRONICITY, UNSPECIFIED LATERALITY: ICD-10-CM

## 2022-02-28 LAB
ANION GAP SERPL CALCULATED.3IONS-SCNC: 5 MMOL/L (ref 3–14)
BASOPHILS # BLD AUTO: 0 10E3/UL (ref 0–0.2)
BASOPHILS NFR BLD AUTO: 1 %
BUN SERPL-MCNC: 27 MG/DL (ref 7–30)
CALCIUM SERPL-MCNC: 8.5 MG/DL (ref 8.5–10.1)
CHLORIDE BLD-SCNC: 104 MMOL/L (ref 94–109)
CO2 SERPL-SCNC: 28 MMOL/L (ref 20–32)
CREAT SERPL-MCNC: 1.03 MG/DL (ref 0.66–1.25)
EOSINOPHIL # BLD AUTO: 0.2 10E3/UL (ref 0–0.7)
EOSINOPHIL NFR BLD AUTO: 3 %
ERYTHROCYTE [DISTWIDTH] IN BLOOD BY AUTOMATED COUNT: 13.6 % (ref 10–15)
GFR SERPL CREATININE-BSD FRML MDRD: 78 ML/MIN/1.73M2
GLUCOSE BLD-MCNC: 137 MG/DL (ref 70–99)
HCT VFR BLD AUTO: 49.4 % (ref 40–53)
HGB BLD-MCNC: 16.1 G/DL (ref 13.3–17.7)
IMM GRANULOCYTES # BLD: 0 10E3/UL
IMM GRANULOCYTES NFR BLD: 0 %
LYMPHOCYTES # BLD AUTO: 1.7 10E3/UL (ref 0.8–5.3)
LYMPHOCYTES NFR BLD AUTO: 22 %
MCH RBC QN AUTO: 32.1 PG (ref 26.5–33)
MCHC RBC AUTO-ENTMCNC: 32.6 G/DL (ref 31.5–36.5)
MCV RBC AUTO: 99 FL (ref 78–100)
MONOCYTES # BLD AUTO: 0.7 10E3/UL (ref 0–1.3)
MONOCYTES NFR BLD AUTO: 9 %
NEUTROPHILS # BLD AUTO: 5 10E3/UL (ref 1.6–8.3)
NEUTROPHILS NFR BLD AUTO: 65 %
NRBC # BLD AUTO: 0 10E3/UL
NRBC BLD AUTO-RTO: 0 /100
PLATELET # BLD AUTO: 233 10E3/UL (ref 150–450)
POTASSIUM BLD-SCNC: 3.5 MMOL/L (ref 3.4–5.3)
RBC # BLD AUTO: 5.01 10E6/UL (ref 4.4–5.9)
SODIUM SERPL-SCNC: 137 MMOL/L (ref 133–144)
WBC # BLD AUTO: 7.7 10E3/UL (ref 4–11)

## 2022-02-28 PROCEDURE — 80048 BASIC METABOLIC PNL TOTAL CA: CPT | Performed by: PHYSICIAN ASSISTANT

## 2022-02-28 PROCEDURE — 36415 COLL VENOUS BLD VENIPUNCTURE: CPT | Performed by: PHYSICIAN ASSISTANT

## 2022-02-28 PROCEDURE — 85025 COMPLETE CBC W/AUTO DIFF WBC: CPT | Performed by: INTERNAL MEDICINE

## 2022-02-28 NOTE — PROGRESS NOTES
Pls call Brayden today or tomorrow -    1. BMP today looked good after starting hydrochlorothiazide 12.5 on 2/10  2. What are BPs?  3. Any Side effects?    Component      Latest Ref Rng & Units 6/30/2021 10/6/2021 2/28/2022   Sodium      133 - 144 mmol/L 141 139 137   Potassium      3.4 - 5.3 mmol/L 3.6 4.2 3.5   Chloride      94 - 109 mmol/L 109 107 104   Carbon Dioxide      20 - 32 mmol/L 27 28 28   Anion Gap      3 - 14 mmol/L 5 4 5   Glucose      70 - 99 mg/dL 89 80 137 (H)   Urea Nitrogen      7 - 30 mg/dL 13 25 27   Creatinine      0.66 - 1.25 mg/dL 1.06 0.96 1.03   GFR Estimate      >60 mL/min/1.73m2 71 80 78   GFR Estimate If Black      >60 mL/min/1.73:m2 82     Calcium      8.5 - 10.1 mg/dL 8.7 8.7 8.5

## 2022-03-01 NOTE — PROGRESS NOTES
03/01/22 Spoke w pt and reviewed labs. He was happy to know they were normal. He was concerned about the high glucose reading as he stated he was fasting  the am of the lab draw. Explained that this is something that can be follow ed by PCP and he should reach out to them to see if another check is warranted in the future.  Pt states he feels well on the hydrochlorothiazide and is now up to taking 25 mg daily.  He has been checking his BP but does not have the record with him. He thinks they have been 120-140/ 60-95 but he cant be sure .  He would like to continue to check 1-2x/day and record. He will send a [employer] message in 1 week with update.  Pt voiced understanding and agreement with plan.   Byron 237 pm

## 2022-03-01 NOTE — PROGRESS NOTES
Message left for patient to review lab results and check on BP readings since hydrochlorothiazide started on 2/10.  Awaiting return call.  PRABHA Palomo

## 2022-03-08 ENCOUNTER — MYC MEDICAL ADVICE (OUTPATIENT)
Dept: CARDIOLOGY | Facility: CLINIC | Age: 72
End: 2022-03-08
Payer: COMMERCIAL

## 2022-03-08 DIAGNOSIS — I48.19 PERSISTENT ATRIAL FIBRILLATION (H): Primary | ICD-10-CM

## 2022-03-08 DIAGNOSIS — I10 ESSENTIAL HYPERTENSION: ICD-10-CM

## 2022-03-08 RX ORDER — HYDROCHLOROTHIAZIDE 25 MG/1
25 TABLET ORAL DAILY
Qty: 30 TABLET | Refills: 4 | Status: SHIPPED | OUTPATIENT
Start: 2022-03-08 | End: 2022-08-04

## 2022-03-08 NOTE — TELEPHONE ENCOUNTER
Can you pls confirm dose of hydrochlorothiazide with Brayden?  I had started just 12.5 mg daily on 2/10 but there's a note from Pat that he increased it to 25 mg daily????    1. Pls check dose of HCTZ and update Epic  2. BPs on whatever dose he's taking are OK - 110-140s, majority in 120s  3. Is his [place] Palsy getting better?   4. See me 3 months with list of BPs to make sure he's still doing OK in AFib. I've placed order.    Reza Rojas

## 2022-03-08 NOTE — TELEPHONE ENCOUNTER
Pt is taking HCTZ 25 mg daily.  Epic updated along with pharmacy. Pt made aware that Crystal that BP's are ok and pt agreed. Pt states that to him he sees no difference with his bells palsy. Right eye still does not close, but his speech was better.  Pt spoke clearly. Pt is set up to see Crystal on 6/6 at 0900. Pt will continue to monitor his BP's. SwapnaRN

## 2022-03-17 NOTE — PROGRESS NOTES
Dr. Cummins,     Do you want to place referral to ENT?    Or should he schedule appt with you first to discuss?

## 2022-03-17 NOTE — PROGRESS NOTES
Patient called, stated he would like a referral to an ear specialist. Stated that he has ringing in his right ear and would like to have it checked out.     Patient can be reached via phone or mychart. Okay to [facility].

## 2022-03-22 ENCOUNTER — NURSE TRIAGE (OUTPATIENT)
Dept: INTERNAL MEDICINE | Facility: CLINIC | Age: 72
End: 2022-03-22
Payer: COMMERCIAL

## 2022-03-22 DIAGNOSIS — H93.13 TINNITUS, BILATERAL: Primary | ICD-10-CM

## 2022-03-22 NOTE — TELEPHONE ENCOUNTER
"Patient called back follow up on documentation only encounter on 1/24/22, no respone, patient requesting ENT referral for follow up  Ringing in ears from [place] Palsy (dx earlier this year. Pended ENT referral       Patient reports bells palsy about a month ago.  Per documentation only encounter on 1/24/22  patient requesting to be seen by ENT.  Right ear.   No decreased hearing  Description: spongy   Intermittent  Denies hearing loss  Bothersome- \"kind of distracting\"  Onset: a few months ago  Denies dizziness      Callback: [phone]- okay to leave detailed VM.     Minerva Meadows RN  [facility]    Reason for Disposition    MODERATE-SEVERE tinnitus (i.e., interferes with work, school, or sleep)    Additional Information    Negative: Followed an ear injury    Negative: Hearing loss is main symptom    Negative: Patient sounds very sick or weak to the triager    Negative: Taking aspirin and dosage sounds high (i.e., > 1500 mg/day)    Negative: Hearing loss in one or both ears and sudden onset and present now    Negative: Ear is painful    Negative: Decreased hearing followed sudden, extremely loud noise (e.g., explosion, not just loud concert)    Negative: Taking medication that can damage hearing (i.e., gentamycin, tobramycin, furosemide, ethacrynic acid, cisplatin, quinidine)    Protocols used: TINNITUS-A-OH      "

## 2022-04-01 ENCOUNTER — OFFICE VISIT (OUTPATIENT)
Dept: URGENT CARE | Facility: URGENT CARE | Age: 72
End: 2022-04-01
Payer: COMMERCIAL

## 2022-04-01 ENCOUNTER — ANCILLARY PROCEDURE (OUTPATIENT)
Dept: GENERAL RADIOLOGY | Facility: CLINIC | Age: 72
End: 2022-04-01
Attending: FAMILY MEDICINE
Payer: COMMERCIAL

## 2022-04-01 VITALS
OXYGEN SATURATION: 95 % | SYSTOLIC BLOOD PRESSURE: 155 MMHG | RESPIRATION RATE: 16 BRPM | TEMPERATURE: 98.6 F | DIASTOLIC BLOOD PRESSURE: 97 MMHG | HEART RATE: 90 BPM | WEIGHT: 265 LBS | BODY MASS INDEX: 40.3 KG/M2

## 2022-04-01 DIAGNOSIS — S49.91XA SHOULDER INJURY, RIGHT, INITIAL ENCOUNTER: Primary | ICD-10-CM

## 2022-04-01 DIAGNOSIS — S29.9XXA TRAUMATIC INJURY OF RIB: ICD-10-CM

## 2022-04-01 PROCEDURE — 71101 X-RAY EXAM UNILAT RIBS/CHEST: CPT | Mod: RT | Performed by: RADIOLOGY

## 2022-04-01 PROCEDURE — 99214 OFFICE O/P EST MOD 30 MIN: CPT | Performed by: FAMILY MEDICINE

## 2022-04-01 PROCEDURE — 73030 X-RAY EXAM OF SHOULDER: CPT | Mod: RT | Performed by: RADIOLOGY

## 2022-04-01 RX ORDER — HYDROCODONE BITARTRATE AND ACETAMINOPHEN 5; 325 MG/1; MG/1
1 TABLET ORAL EVERY 6 HOURS PRN
Qty: 8 TABLET | Refills: 0 | Status: SHIPPED | OUTPATIENT
Start: 2022-04-01 | End: 2022-04-03

## 2022-04-01 NOTE — PROGRESS NOTES
SUBJECTIVE:  Chief Complaint   Patient presents with     Urgent Care     fell last night and injured his right shoulder at home    .keilat presents with a chief complaint of right shoulder and rib.  The injury occurred 1 day ago.   The injury happened while while walking.   How: trauma: immediate pain  The patient complained of moderate pain and has had decreased ROM.    Pain exacerbated by movement    He treated it initially with ice.   This is the first time this type of injury has occurred to this patient.     Past Medical History:   Diagnosis Date     H/O ETOH abuse      Herpes simplex without mention of complication      Hyperlipidemia      Obesity      Persistent atrial fibrillation (H)     persistent, DCCV 12/2017, ablation 11/6/17     Tobacco use disorder     dced 6/2004     Allergies   Allergen Reactions     No Known Drug Allergies      Social History     Tobacco Use     Smoking status: Former Smoker     Packs/day: 1.00     Years: 10.00     Pack years: 10.00     Types: Cigarettes     Start date: 2001     Quit date: 8/20/2011     Years since quitting: 10.6     Smokeless tobacco: Never Used   Substance Use Topics     Alcohol use: No       ROSINTEGUMENTARY/SKIN: NEGATIVE for open wound/bleeding and NEGATIVE for bruising    EXAM: BP (!) 155/97   Pulse 90   Temp 98.6  F (37  C)   Resp 16   Wt 120.2 kg (265 lb)   SpO2 95%   BMI 40.30 kg/m  Gen: healthy,alert,no distress  Extremity: shoulder and rt rib has pain with palpation and rom.   There is not compromise to the distal circulation.  Pulses are +2 and CRT is brisk.GENERAL APPEARANCE: healthy, alert and no distress  EXTREMITIES: peripheral pulses normal  SKIN: no suspicious lesions or rashes  NEURO: Normal strength and tone, sensory exam grossly normal, mentation intact and speech normal    Xray without acute findings, no fx read by Curtis Valverde D.O.      ICD-10-CM    1. Shoulder injury, right, initial encounter  S49.91XA XR Shoulder Right G/E 3 Views      Orthopedic  Referral     HYDROcodone-acetaminophen (NORCO) 5-325 MG tablet   2. Traumatic injury of rib  S29.9XXA XR Ribs & Chest Right G/E 3 Views     Orthopedic  Referral     HYDROcodone-acetaminophen (NORCO) 5-325 MG tablet     RICE

## 2022-04-05 ENCOUNTER — TRANSFERRED RECORDS (OUTPATIENT)
Dept: HEALTH INFORMATION MANAGEMENT | Facility: CLINIC | Age: 72
End: 2022-04-05
Payer: COMMERCIAL

## 2022-05-20 DIAGNOSIS — L65.9 ALOPECIA: ICD-10-CM

## 2022-05-23 RX ORDER — FINASTERIDE 1 MG/1
TABLET, FILM COATED ORAL
Qty: 90 TABLET | Refills: 0 | Status: SHIPPED | OUTPATIENT
Start: 2022-05-23 | End: 2022-08-09

## 2022-05-23 NOTE — TELEPHONE ENCOUNTER
Routing refill request to provider for review/approval because:      LOV: 10/6/21    Pended 90 day supply & 0 refills      Serena Almaguer RN  [facility]

## 2022-05-30 ASSESSMENT — SLEEP AND FATIGUE QUESTIONNAIRES
HOW LIKELY ARE YOU TO NOD OFF OR FALL ASLEEP WHILE SITTING INACTIVE IN A PUBLIC PLACE: WOULD NEVER DOZE
HOW LIKELY ARE YOU TO NOD OFF OR FALL ASLEEP WHILE LYING DOWN TO REST IN THE AFTERNOON WHEN CIRCUMSTANCES PERMIT: WOULD NEVER DOZE
HOW LIKELY ARE YOU TO NOD OFF OR FALL ASLEEP IN A CAR, WHILE STOPPED FOR A FEW MINUTES IN TRAFFIC: WOULD NEVER DOZE
HOW LIKELY ARE YOU TO NOD OFF OR FALL ASLEEP WHILE SITTING AND TALKING TO SOMEONE: WOULD NEVER DOZE
HOW LIKELY ARE YOU TO NOD OFF OR FALL ASLEEP WHILE SITTING AND READING: SLIGHT CHANCE OF DOZING
HOW LIKELY ARE YOU TO NOD OFF OR FALL ASLEEP WHILE SITTING QUIETLY AFTER LUNCH WITHOUT ALCOHOL: WOULD NEVER DOZE
HOW LIKELY ARE YOU TO NOD OFF OR FALL ASLEEP WHILE WATCHING TV: MODERATE CHANCE OF DOZING
HOW LIKELY ARE YOU TO NOD OFF OR FALL ASLEEP WHEN YOU ARE A PASSENGER IN A CAR FOR AN HOUR WITHOUT A BREAK: WOULD NEVER DOZE

## 2022-06-01 ENCOUNTER — VIRTUAL VISIT (OUTPATIENT)
Dept: SLEEP MEDICINE | Facility: CLINIC | Age: 72
End: 2022-06-01
Payer: COMMERCIAL

## 2022-06-01 ENCOUNTER — DOCUMENTATION ONLY (OUTPATIENT)
Dept: SLEEP MEDICINE | Facility: CLINIC | Age: 72
End: 2022-06-01

## 2022-06-01 VITALS — WEIGHT: 250 LBS | HEIGHT: 68 IN | BODY MASS INDEX: 37.89 KG/M2

## 2022-06-01 DIAGNOSIS — E66.01 MORBID OBESITY (H): ICD-10-CM

## 2022-06-01 DIAGNOSIS — I48.0 AF (PAROXYSMAL ATRIAL FIBRILLATION) (H): ICD-10-CM

## 2022-06-01 DIAGNOSIS — G47.33 OSA (OBSTRUCTIVE SLEEP APNEA): Primary | ICD-10-CM

## 2022-06-01 DIAGNOSIS — F51.12 INSUFFICIENT SLEEP SYNDROME: ICD-10-CM

## 2022-06-01 PROCEDURE — 99204 OFFICE O/P NEW MOD 45 MIN: CPT | Mod: 95 | Performed by: INTERNAL MEDICINE

## 2022-06-01 NOTE — PATIENT INSTRUCTIONS
"You will receive the communication from [facility] medical durable medical equipment(DME-phone number#[phone]) once your CPAP becomes available.  After you obtain the CPAP device, we recommend you to use the CPAP regularly during sleep and follow up with the DME if any concerns with mask or pressure settings or other concerns with the device.    Please call our sleep clinic at [phone] after you obtain the CPAP equipment in order to schedule a follow-up via video or telephone visit in approximately 6 to 7 weeks after initiating the CPAP therapy to review the compliance measures.          Frequently asked questions:  1. What is Obstructive Sleep Apnea (BOWEN)? BOWEN is the most common type of sleep apnea. Apnea means, \"without breath.\"  Apnea is most often caused by narrowing or collapse of the upper airway as muscles relax during sleep.   Almost everyone has occasional apneas. Most people with sleep apnea have had brief interruptions at night frequently for many years.  The severity of sleep apnea is related to how frequent and severe the events are.   2. What are the consequences of BOWEN? Symptoms include: feeling sleepy during the day, snoring loudly, gasping or stopping of breathing, trouble sleeping, and occasionally morning headaches or heartburn at night.  Sleepiness can be serious and even increase the risk of falling asleep while driving. Other health consequences may include development of high blood pressure and other cardiovascular disease in persons who are susceptible. Untreated BOWEN  can contribute to heart disease, stroke and diabetes.   3. What are the treatment options? In most situations, sleep apnea is a lifelong disease that must be managed with daily therapy. Medications are not effective for sleep apnea and surgery is generally not considered until other therapies have been tried. Your treatment is your choice . Continuous Positive Airway (CPAP) works right away and is the therapy that is " effective in nearly everyone. An oral device to hold your jaw forward is usually the next most reliable option. Other options include postioning devices (to keep you off your back), weight loss, and surgery including a tongue pacing device. There is more detail about some of these options below.  4. Are my sleep studies covered by insurance? Although we will request verification of coverage, we advise you also check in advance of the study to ensure there is coverage.    Important tips for those choosing CPAP and similar devices   Know your equipment:  CPAP is continuous positive airway pressure that prevents obstructive sleep apnea by keeping the throat from collapsing while you are sleeping. In most cases, the device is  smart  and can slowly self-adjusts if your throat collapses and keeps a record every day of how well you are treated-this information is available to you and your care team.  BPAP is bilevel positive airway pressure that keeps your throat open and also assists each breath with a pressure boost to maintain adequate breathing.  Special kinds of BPAP are used in patients who have inadequate breathing from lung or heart disease. In most cases, the device is  smart  and can slowly self-adjusts to assist breathing. Like CPAP, the device keeps a record of how well you are treated.  Your mask is your connection to the device. You get to choose what feels most comfortable and the staff will help to make sure if fits. Here: are some examples of the different masks that are available:       Key points to remember on your journey with sleep apnea:  Sleep study.  PAP devices often need to be adjusted during a sleep study to show that they are effective and adjusted right.  Good tips to remember: Try wearing just the mask during a quiet time during the day so your body adapts to wearing it. A humidifier is recommended for comfort in most cases to prevent drying of your nose and throat. Allergy medication from  your provider may help you if you are having nasal congestion.  Getting settled-in. It takes more than one night for most of us to get used to wearing a mask. Try wearing just the mask during a quiet time during the day so your body adapts to wearing it. A humidifier is recommended for comfort in most cases. Our team will work with you carefully on the first day and will be in contact within 4 days and again at 2 and 4 weeks for advice and remote device adjustments. Your therapy is evaluated by the device each day.   Use it every night. The more you are able to sleep naturally for 7-8 hours, the more likely you will have good sleep and to prevent health risks or symptoms from sleep apnea. Even if you use it 4 hours it helps. Occasionally all of us are unable to use a medical therapy, in sleep apnea, it is not dangerous to miss one night.   Communicate. Call our skilled team on the number provided on the first day if your visit for problems that make it difficult to wear the device. Over 2 out of 3 patients can learn to wear the device long-term with help from our team. Remember to call our team or your sleep providers if you are unable to wear the device as we may have other solutions for those who cannot adapt to mask CPAP therapy. It is recommended that you sleep your sleep provider within the first 3 months and yearly after that if you are not having problems.   Use it for your health. We encourage use of CPAP masks during daytime quiet periods to allow your face and brain to adapt to the sensation of CPAP so that it will be a more natural sensation to awaken to at night or during naps. This can be very useful during the first few weeks or months of adapting to CPAP though it does not help medically to wear CPAP during wakefulness and  should not be used as a strategy just to meet guidelines.  Take care of your equipment. Make sure you clean your mask and tubing using directions every day and that your filter and  mask are replaced as recommended or if they are not working.       Remember to Drive Safe... Drive Alive     Sleep health profoundly affects your health, mood, and your safety.  Thirty three percent of the population (one in three of us) is not getting enough sleep and many have a sleep disorder. Not getting enough sleep or having an untreated / undertreated sleep condition may make us sleepy without even knowing it. In fact, our driving could be dramatically impaired due to our sleep health. As your provider, here are some things I would like you to know about driving:     Here are some warning signs for impairment and dangerous drowsy driving:              -Having been awake more than 16 hours               -Looking tired               -Eyelid drooping              -Head nodding (it could be too late at this point)              -Driving for more than 30 minutes     Some things you could do to make the driving safer if you are experiencing some drowsiness:              -Stop driving and rest              -Call for transportation              -Make sure your sleep disorder is adequately treated     Some things that have been shown NOT to work when experiencing drowsiness while driving:              -Turning on the radio              -Opening windows              -Eating any  distracting  /  entertaining  foods (e.g., sunflower seeds, candy, or any other)              -Talking on the phone      Your decision may not only impact your life, but also the life of others. Please, remember to drive safe for yourself and all of us.    [facility]      Treating Insomnia  Good sleeping habits are a key part of treatment. If needed, some medications may help you sleep better at first. Making healthy lifestyle changes and learning to relax can improve your sleep. Treating insomnia takes commitment, but trust that your efforts will pay off. Talk to your doctor before taking any medication.    Healthy  Lifestyle  Your lifestyle affects your health and your sleep. Here are some healthy habits:  Keep a regular sleep schedule. Go to bed and get up at the same time each day.  Exercise regularly. It may help you reduce stress. Avoid strenuous exercise for two to four hours before bedtime.  Avoid or limit naps.  Use your bed only for sleep and sex.  Don t spend too much time in bed trying to fall asleep. If you can t fall asleep, get up and do something until you become tired and drowsy.  Avoid or limit caffeine and nicotine. They can keep you awake at night. Also avoid alcohol. It may help you fall asleep at first, but your sleep will not be restful.    Before Bedtime  To sleep better every night, try these tips:  Have a bedtime routine to let your body and mind know when it s time to sleep.  Going to bed should be relaxing so try to do only relaxing things around bedtime. Sleep will come sooner.  If your worries don t let you sleep, write them down in a diary. Then close it, and go to bed.  Make sure the room is not too hot or too cold. If it s not dark enough, an eye mask can help. If it s noisy, try using earplugs.    Learn to Relax  Stress, anxiety, and body tension may keep you awake at night. To unwind before bedtime, try reading a book, meditation, or yoga. Also, try the following:  Deep breathing. Sit or lie back in a chair. Take a slow, deep breath. Hold it for 5 counts. Then breathe out slowly through your mouth. Keep doing this until you feel relaxed.  Imagery. Think of the last fun trip you took. In your mind, walk through the trip from start to finish. Put as much detail into the memory as you can remember. It will help you relax.    Cognitive Behavioral Treatment (CBT)  CBT is the most effective treatment for long-term insomnia. It tries to address the underlying causes of your sleep problems, including your habits and how you think about sleep.      Suggested Resources  Insomnia Treatment Books    Overcoming Insomnia by Randy Gomez and Lacy Cortez (2008)  No More Sleepless Nights by Gilbert Choi and Lora Van (1996)  Say Cosmo to Insomnia by Jah Camp (2009)  The Insomnia Workbook by Millicent Cardona and Ajay Pierre (2009)  The Insomnia Answer by Malick Charles and Tha Lazcano (2006)      Stress Management and Relaxation Books  The Relaxation and Stress Reduction Workbook by Angela Blanco, Marta Su and Mahendra Barnes (2008)  Stress Management Workbook: Techniques and Self-Assessment Procedures by Diamond Ramos and Won Berkowitz (1997)  A Mindfulness-Based Stress Reduction Workbook by Anil Piper and Cristine Ramon (2010)  The Complete Stress Management Workbook by Andriy Villalta, Antelmo Bell and Myke Jenkins (1996)  Assert Yourself by Preeti Francis and Jovani Francis (1977)    Relaxation Resources for Computer Download   These websites offer resources to help you relax. This list is for information only. [facility] is not responsible for the quality of services or the actions of any person or organization.  Progressive Muscle Relaxation (PMR):   http://www.[employer]/progressive-muscle-relaxation-exercise.html   http://studentsupport.[facility]/counseling/resources/self-help/relaxation-and-stress-management/   Deep Breathing Exercises:  http://www.[employer]/breathing-awareness.html     Meditation:   www.[employer]  www.theSimilarity SystemsguidedSimilarity Systemsmeditation-site.com You may have to pay for some of these resources.    Guided Imagery:  http://www.[employer]/guided-imagery-scripts.html   http://[employer]/library/xpvqlrudrg-elqhfa-gaoewgj/     Counseling / Behavioral Health  [facility]  Visit [email or web address] or call [phone] to find a clinic close to you.      This is not a prescription and these resources are optional. You must pay for any costs when using these resources.  Please ask your insurance carrier if you can be reimbursed for these resources. If so, you are responsible for sending the needed details to your insurance carrier. These resources may also be tax deductible as medical expenses. Check with your .     These programs and publications are not affiliated in any way with [employer].

## 2022-06-01 NOTE — NURSING NOTE
Chief Complaint   Patient presents with     Telephone     New Sleep:  Needs new CPAP machine       Patient confirms medications and allergies are accurate via patients echeck in completion, and or denies any changes since last reviewed/verified.     Flu shot given 10/6/21    Tori hTomson, Virtual Facilitator/nabor

## 2022-06-01 NOTE — PROGRESS NOTES
6/1/2022-JL- STAT SETUP OKAY BY DANIEL THORNTON, I LEFT  TO CALL [phone] TO SCHEDULE APPOINTMENT.

## 2022-06-01 NOTE — PROGRESS NOTES
Brayden is a 71 year old who is being evaluated via a billable telephone visit.      What phone number would you like to be contacted at? [phone]  How would you like to obtain your AVS? Jose  Phone call duration: 22 minutes  Telephone visit start time: 8:16 AM  Telephone call end time: 838 AM  Miracle Horan/LPN        Outpatient Sleep Medicine Consultation:      Name: Mike Schultz MRN# [id number]   Age: 71 year old YOB: 1950     Date of Consultation: June 1, 2022  Consultation is requested by: No referring provider defined for this encounter. No ref. provider found  Primary care provider: Loco Cummins       Reason for Sleep Consult:     Mike Schultz is sent by No ref. provider found for a sleep consultation regarding management of previously diagnosed obstructive sleep apnea, wanting to get another CPAP machine.    Patient s Reason for visit  Mike Schultz main reason for visit: Lost cpap machine  2 years ago  [place]  Patient states problem(s) started: June 2020  Mike Schultz's goals for this visit: get another cpap machine           Assessment and Plan:   Previously diagnosed severe obstructive sleep apnea(currently untreated ) in the setting of  atrial fibrillation status post ablation, hypertension, hyperlipidemia, obesity, aortic valve stenosis and ascending aorta dilation.    Patient lost his CPAP device 2 years ago and is interested in obtaining a new CPAP device.  He reports snoring and nonrestorative sleep.  His weight at the time of the previous sleep study was 274 pounds.  His current weight is 250 pounds.  We discussed the option of repeating a sleep study to assess the overall severity of the obstructive sleep apnea versus empirically initiating treatment with auto titrating CPAP.  Patient was not interested in pursuing a repeat sleep study.     At his request, prescription has been generated for auto CPAP device with  pressure settings between 6-13 cmH2O. The plan will be to convert the pressure settings, from the auto titrating mode to to fixed pressure based on the 95th percentile pressures per the compliance download in approx. 2 weeks after initiating the CPAP therapy.  We discussed about the waiting list due to current CPAP supply shortage.  Patient was informed that the [facility] medical will contact him once the device becomes available.  After obtaining the CPAP device, he was recommended you to use the CPAP regularly during sleep and follow up with the DME if any concerns with mask or pressure settings or other concerns with the device.    Patient was  instructed to call our sleep clinic at [phone] after he obtains the CPAP equipment in order to schedule a follow-up via video or telephone visit in approximately 6 to 7 weeks after initiating the CPAP therapy to review compliance measures.    Chronic insufficient sleep: We discussed the consequences of insufficient sleep. Recommended to follow regular sleep schedule, increasing sleep duration by advancing the bedtime during weekdays and aim at obtaining at least 7 to 8 hours per night and avoid sleep deprivation.  For the insomnia , patient was provided with information regarding resources available through [place] insomnia program as after visit summary    Atrial fibrillation: We discussed the association of BOWEN with atrial fibrillation and other cardiovascular diseases.  He will continue follow-up with cardiology    Obesity: We discussed weight management with diet and exercise      Patient was strongly advised to avoid driving, operating any heavy machinery or other hazardous situations while drowsy or sleepy.  Patient was counseled on the importance of driving while alert, to pull over if drowsy, or nap before getting into the vehicle if sleepy.     Summary Counseling:    Obstructive Sleep Apnea Reviewed  Complications of Untreated Sleep Apnea  "Reviewed    Medical Decision-making:   Educational materials provided in instructions      The above note was dictated using voice recognition software. Although reviewed after completion, some word and grammatical error may remain . Please contact the author for any clarifications.    \"I spent a total of  55 minutes with Mike Schultz  during today's telephone visit. Most of this time was spent counseling the patient and  coordinating care regarding  BOWEN, consequences of untreated sleep apnea, a fib and BOWEN, insomnia, increasing sleep duration,  weight management , going over results of previous sleep study,  and chart review., including documentation and further activities as noted above.\"      MD BRAXTON Waggoner [address] [address] [phone]  Dept: [phone]           History of Present Illness:     Mike Schultz was set up at [facility] on September 30, 2016. Patient received a Resmed AirSense 10 CPAP. Pressures were set at 15 cm H2O.        Past Sleep Evaluations:  Study date September 20, 2016  Weight:274 lbs  Diagnostic part of the study:  Respiration: Severe obstructive sleep apnea with both hypoxemia and hypoventilation.    Events - During the diagnostic portion of the study, the polysomnogram revealed a presence of 52 obstructive, 23 central, and 6 mixed apneas resulting in an apnea index of 35.7 events per hour.  There were 87 hypopneas resulting in a hypopnea index of 38.4 events per hour.  The combined apnea/hypopnea index was 74.1 events per hour.  The REM AHI was 72.0 events per hour.  The supine AHI was 79.1 events per hour.  The RERA index was - events per hour.  The RDI was 74.1 events per hour.     Snoring - was reported as loud.    Respiratory rate and pattern - was normal.    Sustained Sleep Associated Hypoventilation - Transcutaneous carbon dioxide monitoring was used, and significant " hypoventilation was present with a maximum change of 9 mmHg (maximum value of 55 mmHg).    Sleep Associated Hypoxemia - (Greater than 5 minutes O2 sat below 89%) was present.  Baseline oxygen saturation was 88.6%. Lowest oxygen saturation was 60.6%.  Time spent less than or equal to 88% was 106.8 minutes.  Time spent less than or equal to 89% was 129.4 minutes.      Treatment PSG :  Respiration: Adequate titration despite development of treatment-emergent central sleep apnea.  The final pressure was 15 cmH2O with an AHI of 15.6 events per hour.  Time in REM supine on final pressure was - minutes.     SLEEP-WAKE SCHEDULE:     Work/School Days: working full time   Usually gets into bed at 10-12 midnight  Takes patient about 45 minutes to fall asleep  Has trouble falling asleep 30 % nights per week  Wakes up in the middle of the night 1x  to go to bathroom  around 2 a.m. times.  Wakes up due to Use the bathroom  He has trouble falling back asleep 20% times a week.   It usually takes 20 minutes to get back to sleep.   Patient is usually up at 6 a.m.  Uses alarm: Yes    Weekends/Non-work Days/All Other Days:  Usually gets into bed at 9-12 midnight   Takes patient about 1 hour to fall asleep  Patient is usually up at 6 a.m. go back to sleep and finally wake up at 9-10am  Uses alarm: Yes    Sleep Need  Patient gets  4-5 hrs sleep on average   Patient thinks he needs about 7-8 hrs sleep    Naps  Patient takes a purposeful nap: never   He dozes off unintentionally never days per week  Patient has had a driving accident or near-miss due to sleepiness/drowsiness: No      SLEEP DISRUPTIONS:    Breathing/Snoring  Patient snores:Yes  Other people complain about his snoring: Yes  Patient has been told he stops breathing in his sleep: Yes(in the past, not anymore)  No gasping/choking  He does not always feel rested upon awakening in mornings.  He reports some fatigue during the morning hours, but denies excessive daytime  sleepiness      Movement:  Patient gets pain, discomfort, with an urge to move:  No  It happens when he is resting:  No  Patient has been told he kicks his legs at night:  No     Behaviors in Sleep:  Patient denies cataplexy.  There are no reports of sleepwalking, sleep eating, sleep talking or dream enactment behavior.    Is there anything else you would like your sleep provider to know: muscle spasms in legs feet and thighs when I move around       CAFFEINE AND OTHER SUBSTANCES:    Patient consumes caffeinated beverages per day:  2x  Last caffeine use is usually: noon  List of any prescribed or over the counter stimulants that patient takes: none  List of any prescribed or over the counter sleep medication patient takes: none  List of previous sleep medications that patient has tried: nyquil  Patient drinks alcohol to help them sleep: No  Patient drinks alcohol near bedtime: Yes rarely      SCALES:    EPWORTH SLEEPINESS SCALE      [place] Sleepiness Scale ( SOPHIE Parsons  [id number]<br>ESS - [place]/English - Final version - 21 Nov 07 - [facility] [place].) 5/30/2022   Sitting and reading Slight chance of dozing   Watching TV Moderate chance of dozing   Sitting, inactive in a public place (e.g. a theatre or a meeting) Would never doze   As a passenger in a car for an hour without a break Would never doze   Lying down to rest in the afternoon when circumstances permit Would never doze   Sitting and talking to someone Would never doze   Sitting quietly after a lunch without alcohol Would never doze   In a car, while stopped for a few minutes in traffic Would never doze   [place] Score (MC) 3   [place] Score (Sleep) 3         INSOMNIA SEVERITY INDEX (BERTHA)      Insomnia Severity Index (BERTHA) 5/30/2022   Difficulty falling asleep 2   Difficulty staying asleep 2   Problems waking up too early 1   How SATISFIED/DISSATISFIED are you with your CURRENT sleep pattern? 3   How NOTICEABLE to others do you think your sleep problem  is in terms of impairing the quality of your life? 1   How WORRIED/DISTRESSED are you about your current sleep problem? 1   To what extent do you consider your sleep problem to INTERFERE with your daily functioning (e.g. daytime fatigue, mood, ability to function at work/daily chores, concentration, memory, mood, etc.) CURRENTLY? 2   BERTHA Total Score 12       Guidelines for Scoring/Interpretation:  Total score categories:  0-7 = No clinically significant insomnia   8-14 = Subthreshold insomnia   15-21 = Clinical insomnia (moderate severity)  22-28 = Clinical insomnia (severe)  Used via courtesy of www.[employer].va.gov with permission from Ajay Pierre PhD., [facility]      Allergies:    Allergies   Allergen Reactions     No Known Drug Allergies        Medications:    Current Outpatient Medications   Medication Sig Dispense Refill     allopurinol (ZYLOPRIM) 300 MG tablet Take 1 tablet (300 mg) by mouth daily 90 tablet 0     cetirizine (ZYRTEC) 10 MG tablet Take 10 mg by mouth as needed for allergies       finasteride (PROPECIA) 1 MG tablet TAKE ONE TABLET BY MOUTH ONE TIME DAILY 90 tablet 0     hydrochlorothiazide (HYDRODIURIL) 25 MG tablet Take 1 tablet (25 mg) by mouth daily 30 tablet 4     losartan (COZAAR) 100 MG tablet Take 1 tablet (100 mg) by mouth daily. 90 tablet 2     rivaroxaban ANTICOAGULANT (XARELTO ANTICOAGULANT) 20 MG TABS tablet Take 1 tablet (20 mg) by mouth daily (with dinner) 90 tablet 3     valACYclovir (VALTREX) 1000 mg tablet Take 1 tablet (1,000 mg) by mouth 3 times daily for 7 days 21 tablet 0       Problem List:  Patient Active Problem List    Diagnosis Date Noted     Bilateral carpal tunnel syndrome 10/21/2020     Priority: Medium     Persistent atrial fibrillation (H) 02/26/2019     Priority: Medium     Added automatically from request for surgery [id number]       Venous insufficiency 03/02/2018     Priority: Medium     Aortic valve stenosis 10/18/2017     Priority: Medium     Impaired  fasting glucose 02/03/2017     Priority: Medium     Ascending aorta dilatation (H) 12/23/2016     Priority: Medium     Aortic root dilatation       BOWEN (obstructive sleep apnea) 09/30/2016     Priority: Medium     Split Night:  Sleep Study 9/20/2016 - (274.0 lbs) AHI 74.1, RDI 74.1, Supine AHI 79.1, REM AHI 43.6, Low O2% 61.6%, Time Spent ?88% 106.8, Time Spent ?89% 129.4, CPAP was titrated to a pressure of 15 with an AHI 15.6. Time spent in REM supine at this pressure was - minutes.       Advanced directives, counseling/discussion 01/05/2015     Priority: Medium     Advance Care Planning:   ACP Review and Resources Provided:  Reviewed chart for advance care plan.  Mike LIMON Antoine has no plan or code status on file. Discussed available resources and provided with information. Confirmed code status reflects current choices pending further ACP discussions.  Confirmed/documented designated decision maker(s). See permanent comments section of demographics in clinical tab.   Added by Shanthi Osei on 1/5/2015       Family history of ischemic heart disease 01/05/2015     Priority: Medium     Father MI 42, brother CABG mid 50's  Patient with perfusion study 7/2012       Gout 01/05/2015     Priority: Medium     Many episodes in right foot.   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  Often related to alcohol intake       Alcoholism (H)      Priority: Medium     Prior heavy abuse.  Went through treatments in past  [facility] 2016  Using 5 - 6 shots of vodka  per day as of 9/16/2016.   Stopped drinking fully 1/1/2017         Atrial fibrillation, permanent (H)      Priority: Medium     Cardiologist Dr. Yates  DCCV 12/2017, ablation 11/6/17  Echo 12/2014 - LVH, borderline LV size, CHARBEL, 1-2+ MR, borderline prox aorta size       Tobacco use disorder, moderate, in sustained remission      Priority: Medium     dced 6/2004       HYPERLIPIDEMIA LDL GOAL <130 10/31/2010     Priority: Medium     Morbid obesity      Priority: Medium     Herpes simplex virus  (HSV) infection      Priority: Medium     Problem list name updated by automated process. Provider to review       Allergic rhinitis 05/12/2005     Priority: Medium     Problem list name updated by automated process. Provider to review       Essential hypertension 04/12/2005     Priority: Medium     Impotence of organic origin 04/12/2005     Priority: Medium        Past Medical/Surgical History:  Past Medical History:   Diagnosis Date     H/O ETOH abuse      Herpes simplex without mention of complication      Hyperlipidemia      Obesity      Persistent atrial fibrillation (H)     persistent, DCCV 12/2017, ablation 11/6/17     Tobacco use disorder     dced 6/2004     Past Surgical History:   Procedure Laterality Date     ABLATION OF DYSRHYTHMIC FOCUS  2018, 04/12/2019    Procedure: EP Ablation Focal AFIB;  Surgeon: Fady Day MD;  Location:  [facility]     ACHILLES TENDON SURGERY  1997     ANESTHESIA CARDIOVERSION N/A 2/26/2019    Procedure: ANESTHESIA CARDIOVERSION (CONNER);  Surgeon: GENERIC ANESTHESIA PROVIDER;  Location:  OR     ANESTHESIA CARDIOVERSION N/A 11/19/2020    Procedure: ANESTHESIA, FOR CARDIOVERSION (dr campbell);  Surgeon: GENERIC ANESTHESIA PROVIDER;  Location:  OR     BASAL CELL CARCINOMA EXCISION Right 2009    nose, took cartilage from inner ear.     CARDIOVERSION  2012, 2019    cardioversion for atr fib     COLONOSCOPY  2008     EP ABLATION FOCAL AFIB N/A 4/12/2019    Procedure: EP Ablation Focal AFIB;  Surgeon: Fady Day MD;  Location:  [facility]     HERNIA REPAIR, UMBILICAL  08/20/2012    Procedure: HERNIORRHAPHY UMBILICAL;  UMBILICAL HERNIA REPAIR;  Surgeon: Ra Crocker MD;  [facility]: [facility]     HERNIORRHAPHY UMBILICAL  8/20/2012    Procedure: HERNIORRHAPHY UMBILICAL;  UMBILICAL HERNIA REPAIR;  Surgeon: Ra Crocker MD;  Location: [facility]     ORTHOPEDIC SURGERY       ORTHOPEDIC SURGERY       OTHER SURGICAL HISTORY Right 03/2017    total KNEE  ARTHROSCOPY      TOTAL KNEE ARTHROPLASTY Left 2018     [facility] NONSPECIFIC PROCEDURE      achilles tendon     Z NONSPECIFIC PROCEDURE      knees, arthroscopy     Z NONSPECIFIC PROCEDURE      basal cell skin ca, nose     Z NONSPECIFIC PROCEDURE      flex sig; colonoscopy due 3/2008     Z NONSPECIFIC PROCEDURE      cardioversion for atr fib     ZZC TOTAL KNEE ARTHROPLASTY Left 2018    Dr. Malick Suarez       Social History:  Social History     Socioeconomic History     Marital status: Single     Spouse name: Not on file     Number of children: Not on file     Years of education: Not on file     Highest education level: Not on file   Occupational History     Not on file   Tobacco Use     Smoking status: Former Smoker     Packs/day: 1.00     Years: 10.00     Pack years: 10.00     Types: Cigarettes     Start date:      Quit date: 2011     Years since quitting: 10.     Smokeless tobacco: Never Used   Substance and Sexual Activity     Alcohol use: No     Drug use: No     Sexual activity: Yes     Partners: Female   Other Topics Concern     Parent/sibling w/ CABG, MI or angioplasty before 65F 55M? Not Asked   Social History Narrative     Not on file     Social Determinants of Health     Financial Resource Strain: Not on file   Food Insecurity: Not on file   Transportation Needs: Not on file   Physical Activity: Not on file   Stress: Not on file   Social Connections: Not on file   Intimate Partner Violence: Not on file   Housing Stability: Not on file       Family History:  Family History   Problem Relation Age of Onset     Family History Negative Mother      Heart Disease Father         decesased at 42 - MI     Heart Disease Sister          MI     Heart Disease Brother          MI     Heart Disease Brother         CABG AT 49     Lipids Sister      Lipids Sister      Lipids Sister      Family History Negative Brother        Review of Systems:  A complete review of systems  "reviewed by me is negative with the exeption of what has been mentioned in the history of present illness.  In the last TWO WEEKS have you experienced any of the following symptoms?  Fevers: No  Night Sweats: No  Weight Gain: No  Pain at Night: No  Double Vision: No  Changes in Vision: No  Difficulty Breathing through Nose: No  Sore Throat in Morning: No  Dry Mouth in the Morning: No  Shortness of Breath Lying Flat: No  Shortness of Breath With Activity: No  Awakening with Shortness of Breath: No  Increased Cough: No  Heart Racing at Night: No  Swelling in Feet or Legs: No  Diarrhea at Night: No  Heartburn at Night: No  Urinating More than Once at Night: No  Losing Control of Urine at Night: No  Joint Pains at Night: No  Headaches in Morning: No  Weakness in Arms or Legs: No  Depressed Mood: No  Anxiety: No        Physical Examination:  Vitals: Ht 1.727 m (5' 8\")   Wt 113.4 kg (250 lb)   BMI 38.01 kg/m    BMI= Body mass index is 38.01 kg/m .    General: No apparent distress  Chest: No cough, no audible wheezing, able to talk in full sentences  Psych: coherent speech, normal rate and volume, able to articulate logical thoughts, able   to abstract reason, no tangential thoughts, no hallucinations   or delusions  His affect is normal  Neuro:  Mental status: Alert and  Oriented X 3  Speech: normal            Data: All pertinent previous laboratory data reviewed     Recent Labs   Lab Test 02/28/22  0818 10/06/21  1759    139   POTASSIUM 3.5 4.2   CHLORIDE 104 107   CO2 28 28   ANIONGAP 5 4   * 80   BUN 27 25   CR 1.03 0.96   ASHELY 8.5 8.7       Recent Labs   Lab Test 02/28/22  0818   WBC 7.7   RBC 5.01   HGB 16.1   HCT 49.4   MCV 99   MCH 32.1   MCHC 32.6   RDW 13.6          Recent Labs   Lab Test 10/06/21  1759 06/30/21  1525   PROTTOTAL  --  7.1   ALBUMIN  --  3.6   BILITOTAL  --  0.6   ALKPHOS  --  87   AST  --  24   ALT 45 42       TSH (mU/L)   Date Value   06/30/2021 2.22   09/21/2020 0.82 "       Echocardiology: 1/10/22  Interpretation Summary:     The left ventricle is normal in size.  Left ventricular systolic function is mildly reduced.  The visual ejection fraction is 50-55%.  Diastolic Doppler findings (E/E' ratio and/or other parameters) suggest left  ventricular filling pressures are increased.  The right ventricle is normal in structure, function and size.  Mild to moderate valvular aortic stenosis.  There is trace aortic regurgitation.  Compared to the previous study, the AS is slightly more severe.      Chest x-ray: XR Ribs & Chest Right G/E 3 Views 04/01/2022    Narrative  RIBS AND CHEST RIGHT THREE VIEW  DATE/TIME: 4/1/2022 11:05 AM    INDICATION: Right-sided rib pain after trauma.  COMPARISON: 8/16/2012.    Impression  IMPRESSION:  1.  No rib fracture.  2.  Unremarkable heart and lungs.  3.  Degenerative changes in the right shoulder.    MILEY CABRERA MD      SYSTEM ID:  [id number]    Chest CT: No results found for this or any previous visit from the past 365 days.    PFT: Most Recent [facility] Pulmonary Function Testing    No results found for: 20001     Rizwana Rincon MD 6/1/2022

## 2022-06-10 ENCOUNTER — DOCUMENTATION ONLY (OUTPATIENT)
Dept: SLEEP MEDICINE | Facility: CLINIC | Age: 72
End: 2022-06-10
Payer: COMMERCIAL

## 2022-06-10 DIAGNOSIS — G47.33 OSA (OBSTRUCTIVE SLEEP APNEA): Primary | ICD-10-CM

## 2022-06-10 NOTE — PROGRESS NOTES
Patient was offered choice [facility]  Patient Mike Schultz was set up at [place] on Patience 10, 2022. Patient received a Resmed Airsense 11 Pressures were set at 6-13 cm H2O.   Patient s ramp is 4 cm H2O for Auto and FLEX/EPR is 2.  Patient received a Resmed Mask name: AirFit P30i  Pillow mask size Standard, heated tubing and heated humidifier.  Patient does not need to meet compliance.  Tova Win

## 2022-06-22 ENCOUNTER — DOCUMENTATION ONLY (OUTPATIENT)
Dept: SLEEP MEDICINE | Facility: CLINIC | Age: 72
End: 2022-06-22

## 2022-06-22 DIAGNOSIS — G47.33 OSA (OBSTRUCTIVE SLEEP APNEA): Primary | ICD-10-CM

## 2022-06-22 NOTE — PROGRESS NOTES
3 day Sleep therapy management telephone visit    Diagnostic AHI: 74.1  PSG    Confirmed with patient at time of call- Yes Patient is still interested in STM service       Subjective measures:  Patient slowing getting used to CPAP         Objective data     Order Settings for PAP  CPAP min 6    CPAP max 13    CPAP fixed         Device settings from machine CPAP min 6.0     CPAP max 13.0      CPAP fixed      EPR Setting TWO    RESMED soft response  OFF     Assessment: Minimal usage      Action plan: Patient to have 14 day STM visit. Patient has a follow up visit scheduled:   no and not required by insurance    Replacement device: Yes  STM ordered by provider: Yes     Total time spent on accessing and  interpreting remote patient PAP therapy data  10   minutes    Total time spent counseling, coaching  and reviewing PAP therapy data with patient  3 minutes    51600 no

## 2022-06-29 ENCOUNTER — DOCUMENTATION ONLY (OUTPATIENT)
Dept: SLEEP MEDICINE | Facility: CLINIC | Age: 72
End: 2022-06-29

## 2022-06-29 DIAGNOSIS — G47.33 OSA (OBSTRUCTIVE SLEEP APNEA): Primary | ICD-10-CM

## 2022-06-29 NOTE — PROGRESS NOTES
14  DAY STM VISIT    Diagnostic AHI: 74.1  PSG    Subjective measures:   Patient says he is working on getting used to CPAP. He did not have any specific issues other than feeling like he is not getting enough air in the beginning of the night. Increased ramp min to 6cm H2O    Assessment: Pt not meeting objective benchmarks for compliance  Patient meeting subjective benchmarks. pressure issues    Action plan: pt to have 30 day STM visit.      Device type: Auto-CPAP    PAP settings: CPAP min 6.0 cm  H20       CPAP max 13.0 cm  H20      95th% pressure 10.1 cm  H20        RESMED EPR level Setting: TWO    RESMED Soft response setting:  OFF    Mask type:  Nasal Pillows    Objective measures: 14 day rolling measures      Compliance  0 %      Leak  15.6  lpm  last  upload      AHI 6.07   last  upload      Average number of minutes 53      Objective measure goal  Compliance   Goal >70%  Leak   Goal < 24 lpm  AHI  Goal < 5  Usage  Goal >240        Total time spent on accessing and interpreting remote patient PAP therapy data  10 minutes    Total time spent counseling, coaching  and reviewing PAP therapy data with patient  5 minutes    [id number]  [id number]  no (3 day STM)

## 2022-07-12 ENCOUNTER — DOCUMENTATION ONLY (OUTPATIENT)
Dept: SLEEP MEDICINE | Facility: CLINIC | Age: 72
End: 2022-07-12

## 2022-07-12 DIAGNOSIS — G47.33 OSA (OBSTRUCTIVE SLEEP APNEA): Primary | ICD-10-CM

## 2022-07-12 NOTE — PROGRESS NOTES
30 DAY [facility] VISIT    Diagnostic AHI: 74.1  PSG    Message left for patient to return call     Assessment: Pt not meeting objective benchmarks for AHI, leak and compliance     Action plan: waiting for patient to return call.   Patient has not scheduled a follow up visit.  Device type: Auto-CPAP  PAP settings: CPAP min 6.0 cm  H20     CPAP max 13.0 cm  H20    95th% pressure 10.2 cm  H20      RESMED EPR level Setting: TWO    RESMED Soft response setting:  OFF  Mask type:  Nasal Pillows  Objective measures: 14 day rolling measures      Compliance  7 %      Leak  22.8 lpm  last  upload      AHI 15.63   last  upload      Average number of minutes 37      Objective measure goal  Compliance   Goal >70%  Leak   Goal < 24 lpm  AHI  Goal < 5  Usage  Goal >240        Total time spent on accessing and interpreting remote patient PAP therapy data  10 minutes    Total time spent counseling, coaching  and reviewing PAP therapy data with patient  1 minutes     [id number] this call  [id number] no  at 3 or 14 day [facility]

## 2022-08-04 DIAGNOSIS — I48.19 PERSISTENT ATRIAL FIBRILLATION (H): ICD-10-CM

## 2022-08-04 DIAGNOSIS — I10 ESSENTIAL HYPERTENSION: ICD-10-CM

## 2022-08-04 RX ORDER — HYDROCHLOROTHIAZIDE 25 MG/1
25 TABLET ORAL DAILY
Qty: 90 TABLET | Refills: 1 | Status: SHIPPED | OUTPATIENT
Start: 2022-08-04 | End: 2022-09-08

## 2022-08-04 NOTE — TELEPHONE ENCOUNTER
Received refill request for:  Hydrochlorothiazide, Xarelto  Last OV was: 2/10/22 Carolina BRITTON  Labs/EKG: BMP, CBC 2/28/22  F/U scheduled:   Date Time Provider Department Center   9/8/2022 10:30 AM Natalie Campos PA-C [facility] CLIN   11/15/2022 10:15 AM Renate Jeong PA-C [facility]     Pharmacy: [facility] Refill Guideline reviewed.  Medication meets criteria for refill.    Olga Rojas, RN, BSN  08/04/22 at 2:30 PM

## 2022-08-05 ENCOUNTER — OFFICE VISIT (OUTPATIENT)
Dept: CARDIOLOGY | Facility: CLINIC | Age: 72
End: 2022-08-05

## 2022-08-05 ENCOUNTER — LAB (OUTPATIENT)
Dept: LAB | Facility: CLINIC | Age: 72
End: 2022-08-05
Payer: COMMERCIAL

## 2022-08-05 VITALS
BODY MASS INDEX: 42.28 KG/M2 | HEART RATE: 54 BPM | WEIGHT: 279 LBS | SYSTOLIC BLOOD PRESSURE: 140 MMHG | DIASTOLIC BLOOD PRESSURE: 82 MMHG | HEIGHT: 68 IN

## 2022-08-05 DIAGNOSIS — I48.19 PERSISTENT ATRIAL FIBRILLATION (H): ICD-10-CM

## 2022-08-05 DIAGNOSIS — I48.19 PERSISTENT ATRIAL FIBRILLATION (H): Primary | ICD-10-CM

## 2022-08-05 DIAGNOSIS — I10 BENIGN ESSENTIAL HYPERTENSION: ICD-10-CM

## 2022-08-05 DIAGNOSIS — Z79.899 ON AMIODARONE THERAPY: ICD-10-CM

## 2022-08-05 LAB
ANION GAP SERPL CALCULATED.3IONS-SCNC: 6 MMOL/L (ref 3–14)
BUN SERPL-MCNC: 19 MG/DL (ref 7–30)
CALCIUM SERPL-MCNC: 8.8 MG/DL (ref 8.5–10.1)
CHLORIDE BLD-SCNC: 105 MMOL/L (ref 94–109)
CO2 SERPL-SCNC: 29 MMOL/L (ref 20–32)
CREAT SERPL-MCNC: 0.97 MG/DL (ref 0.66–1.25)
GFR SERPL CREATININE-BSD FRML MDRD: 84 ML/MIN/1.73M2
GLUCOSE BLD-MCNC: 91 MG/DL (ref 70–99)
POTASSIUM BLD-SCNC: 3.5 MMOL/L (ref 3.4–5.3)
SODIUM SERPL-SCNC: 140 MMOL/L (ref 133–144)

## 2022-08-05 PROCEDURE — 93000 ELECTROCARDIOGRAM COMPLETE: CPT | Performed by: NURSE PRACTITIONER

## 2022-08-05 PROCEDURE — 80048 BASIC METABOLIC PNL TOTAL CA: CPT | Performed by: NURSE PRACTITIONER

## 2022-08-05 PROCEDURE — 99214 OFFICE O/P EST MOD 30 MIN: CPT | Performed by: NURSE PRACTITIONER

## 2022-08-05 PROCEDURE — 36415 COLL VENOUS BLD VENIPUNCTURE: CPT | Performed by: NURSE PRACTITIONER

## 2022-08-05 RX ORDER — FUROSEMIDE 20 MG
20 TABLET ORAL PRN
COMMUNITY
Start: 2022-08-05 | End: 2022-09-08

## 2022-08-05 NOTE — PROGRESS NOTES
HPI and Plan: #[id number]  See dictation    Today's clinic visit entailed:  Review of the result(s) of each unique test - EKG  Ordering of each unique test  Prescription drug management  No LOS data to display   Time spent doing chart review, history and exam, documentation and further activities per the note  Provider  Link to MDM Help Grid     The level of medical decision making during this visit was of moderate complexity.      Orders Placed This Encounter   Procedures     Basic metabolic panel     EKG 12-lead complete w/read - Clinics       Orders Placed This Encounter   Medications     furosemide (LASIX) 20 MG tablet     Sig: Take 20 mg by mouth as needed       There are no discontinued medications.      Encounter Diagnoses   Name Primary?     Persistent atrial fibrillation (H) Yes     On amiodarone therapy        CURRENT MEDICATIONS:  Current Outpatient Medications   Medication Sig Dispense Refill     allopurinol (ZYLOPRIM) 300 MG tablet Take 1 tablet (300 mg) by mouth daily 90 tablet 0     cetirizine (ZYRTEC) 10 MG tablet Take 10 mg by mouth as needed for allergies       finasteride (PROPECIA) 1 MG tablet TAKE ONE TABLET BY MOUTH ONE TIME DAILY 90 tablet 0     furosemide (LASIX) 20 MG tablet Take 20 mg by mouth as needed       hydrochlorothiazide (HYDRODIURIL) 25 MG tablet Take 1 tablet (25 mg) by mouth daily 90 tablet 1     losartan (COZAAR) 100 MG tablet Take 1 tablet (100 mg) by mouth daily. 90 tablet 2     rivaroxaban ANTICOAGULANT (XARELTO ANTICOAGULANT) 20 MG TABS tablet Take 1 tablet (20 mg) by mouth daily (with dinner) 90 tablet 1     amiodarone (PACERONE) 200 MG tablet Take 2 tablets (400 mg) by mouth 2 times daily for 7 days, THEN 1 tablet (200 mg) 2 times daily for 7 days, THEN 1 tablet (200 mg) daily for 30 days. (Patient not taking: Reported on 8/5/2022) 72 tablet 0     valACYclovir (VALTREX) 1000 mg tablet Take 1 tablet (1,000 mg) by mouth 3 times daily for 7 days 21 tablet 0       ALLERGIES      Allergies   Allergen Reactions     No Known Drug Allergies        PAST MEDICAL HISTORY:  Past Medical History:   Diagnosis Date     H/O ETOH abuse      Herpes simplex without mention of complication      Hyperlipidemia      Obesity      Persistent atrial fibrillation (H)     persistent, DCCV 12/2017, ablation 11/6/17     Tobacco use disorder     dced 6/2004       PAST SURGICAL HISTORY:  Past Surgical History:   Procedure Laterality Date     ABLATION OF DYSRHYTHMIC FOCUS  2018, 04/12/2019    Procedure: EP Ablation Focal AFIB;  Surgeon: Fady Day MD;  Location:  HEART CARDIAC CATH LAB     ACHILLES TENDON SURGERY  1997     ANESTHESIA CARDIOVERSION N/A 2/26/2019    Procedure: ANESTHESIA CARDIOVERSION (CONNER);  Surgeon: GENERIC ANESTHESIA PROVIDER;  Location:  OR     ANESTHESIA CARDIOVERSION N/A 11/19/2020    Procedure: ANESTHESIA, FOR CARDIOVERSION (dr campbell);  Surgeon: GENERIC ANESTHESIA PROVIDER;  Location:  OR     BASAL CELL CARCINOMA EXCISION Right 2009    nose, took cartilage from inner ear.     CARDIOVERSION  2012, 2019    cardioversion for atr fib     COLONOSCOPY  2008     EP ABLATION FOCAL AFIB N/A 4/12/2019    Procedure: EP Ablation Focal AFIB;  Surgeon: Fady Day MD;  Location:  HEART CARDIAC CATH LAB     HERNIA REPAIR, UMBILICAL  08/20/2012    Procedure: HERNIORRHAPHY UMBILICAL;  UMBILICAL HERNIA REPAIR;  Surgeon: Ra Crocker MD;  Location: [facility]     HERNIORRHAPHY UMBILICAL  8/20/2012    Procedure: HERNIORRHAPHY UMBILICAL;  UMBILICAL HERNIA REPAIR;  Surgeon: Ra Crocker MD;  Location: [facility]     ORTHOPEDIC SURGERY       ORTHOPEDIC SURGERY       OTHER SURGICAL HISTORY Right 03/2017    total KNEE ARTHROSCOPY      TOTAL KNEE ARTHROPLASTY Left 03/16/2018     ZZC NONSPECIFIC PROCEDURE  1997    achilles tendon     ZZC NONSPECIFIC PROCEDURE      knees, arthroscopy     ZZC NONSPECIFIC PROCEDURE      basal cell skin ca, nose     ZZC NONSPECIFIC PROCEDURE      flex sig; colonoscopy  "due 3/2008     ZZC NONSPECIFIC PROCEDURE      cardioversion for atr fib     ZZC TOTAL KNEE ARTHROPLASTY Left 2018    Dr. Malick Suarez       FAMILY HISTORY:  Family History   Problem Relation Age of Onset     Family History Negative Mother      Heart Disease Father         decesased at 42 - MI     Heart Disease Sister          MI     Heart Disease Brother          MI     Heart Disease Brother         CABG AT 49     Lipids Sister      Lipids Sister      Lipids Sister      Family History Negative Brother        SOCIAL HISTORY:  Social History     Socioeconomic History     Marital status: Single     Spouse name: None     Number of children: None     Years of education: None     Highest education level: None   Tobacco Use     Smoking status: Former Smoker     Packs/day: 1.00     Years: 10.00     Pack years: 10.00     Types: Cigarettes     Start date:      Quit date: 2011     Years since quitting: 10.9     Smokeless tobacco: Never Used   Substance and Sexual Activity     Alcohol use: No     Drug use: No     Sexual activity: Yes     Partners: Female       Review of Systems:  Skin:          Eyes:         ENT:         Respiratory:          Cardiovascular:         Gastroenterology:        Genitourinary:         Musculoskeletal:         Neurologic:         Psychiatric:         Heme/Lymph/Imm:         Endocrine:           Physical Exam:  Vitals: BP (!) 140/82   Pulse 54   Ht 1.727 m (5' 8\")   Wt 126.6 kg (279 lb)   BMI 42.42 kg/m      Constitutional:  cooperative, alert and oriented, well developed, well nourished, in no acute distress        Skin:  warm and dry to the touch, no apparent skin lesions or masses noted          Head:  normocephalic, no masses or lesions        Eyes:  pupils equal and round;conjunctivae and lids unremarkable        Lymph:      ENT:           Neck:  carotid pulses are full and equal bilaterally, JVP normal, no carotid bruit        Respiratory:  normal breath " sounds, clear to auscultation, normal A-P diameter, normal symmetry, normal respiratory excursion, no use of accessory muscles         Cardiac:   irregularly irregular rhythm   no presence of murmur          not assessed this visit                                        GI:  abdomen soft;BS normoactive obese      Extremities and Muscular Skeletal:  no deformities, clubbing, cyanosis, erythema observed;no edema;normal muscle strength and tone              Neurological:  affect appropriate   Right sided facial drooping present.    Psych:  Alert and Oriented x 3        CC  No referring provider defined for this encounter.

## 2022-08-05 NOTE — LETTER
8/5/2022    Loco Cummins MD  [address] [place] [id number]    RE: Mike Schultz       Dear Colleague,     [facility] the [facility].  HPI and Plan: #[id number]  See dictation    Today's clinic visit entailed:  Review of the result(s) of each unique test - EKG  Ordering of each unique test  Prescription drug management  No LOS data to display   Time spent doing chart review, history and exam, documentation and further activities per the note  Provider  Link to MDM Help Grid     The level of medical decision making during this visit was of moderate complexity.      Orders Placed This Encounter   Procedures     Basic metabolic panel     EKG 12-lead complete w/read - Clinics       Orders Placed This Encounter   Medications     furosemide (LASIX) 20 MG tablet     Sig: Take 20 mg by mouth as needed       There are no discontinued medications.      Encounter Diagnoses   Name Primary?     Persistent atrial fibrillation (H) Yes     On amiodarone therapy        CURRENT MEDICATIONS:  Current Outpatient Medications   Medication Sig Dispense Refill     allopurinol (ZYLOPRIM) 300 MG tablet Take 1 tablet (300 mg) by mouth daily 90 tablet 0     cetirizine (ZYRTEC) 10 MG tablet Take 10 mg by mouth as needed for allergies       finasteride (PROPECIA) 1 MG tablet TAKE ONE TABLET BY MOUTH ONE TIME DAILY 90 tablet 0     furosemide (LASIX) 20 MG tablet Take 20 mg by mouth as needed       hydrochlorothiazide (HYDRODIURIL) 25 MG tablet Take 1 tablet (25 mg) by mouth daily 90 tablet 1     losartan (COZAAR) 100 MG tablet Take 1 tablet (100 mg) by mouth daily. 90 tablet 2     rivaroxaban ANTICOAGULANT (XARELTO ANTICOAGULANT) 20 MG TABS tablet Take 1 tablet (20 mg) by mouth daily (with dinner) 90 tablet 1     amiodarone (PACERONE) 200 MG tablet Take 2 tablets (400 mg) by mouth 2 times daily for 7 days, THEN 1 tablet (200 mg) 2 times daily for 7 days, THEN 1 tablet (200  mg) daily for 30 days. (Patient not taking: Reported on 8/5/2022) 72 tablet 0     valACYclovir (VALTREX) 1000 mg tablet Take 1 tablet (1,000 mg) by mouth 3 times daily for 7 days 21 tablet 0       ALLERGIES     Allergies   Allergen Reactions     No Known Drug Allergies        PAST MEDICAL HISTORY:  Past Medical History:   Diagnosis Date     H/O ETOH abuse      Herpes simplex without mention of complication      Hyperlipidemia      Obesity      Persistent atrial fibrillation (H)     persistent, DCCV 12/2017, ablation 11/6/17     Tobacco use disorder     dced 6/2004       PAST SURGICAL HISTORY:  Past Surgical History:   Procedure Laterality Date     ABLATION OF DYSRHYTHMIC FOCUS  2018, 04/12/2019    Procedure: EP Ablation Focal AFIB;  Surgeon: Fady Day MD;  Location:  HEART CARDIAC CATH LAB     ACHILLES TENDON SURGERY  1997     ANESTHESIA CARDIOVERSION N/A 2/26/2019    Procedure: ANESTHESIA CARDIOVERSION (CONNER);  Surgeon: GENERIC ANESTHESIA PROVIDER;  Location:  OR     ANESTHESIA CARDIOVERSION N/A 11/19/2020    Procedure: ANESTHESIA, FOR CARDIOVERSION (dr campbell);  Surgeon: GENERIC ANESTHESIA PROVIDER;  Location:  OR     BASAL CELL CARCINOMA EXCISION Right 2009    nose, took cartilage from inner ear.     CARDIOVERSION  2012, 2019    cardioversion for atr fib     COLONOSCOPY  2008     EP ABLATION FOCAL AFIB N/A 4/12/2019    Procedure: EP Ablation Focal AFIB;  Surgeon: Fady Day MD;  Location:  HEART CARDIAC CATH LAB     HERNIA REPAIR, UMBILICAL  08/20/2012    Procedure: HERNIORRHAPHY UMBILICAL;  UMBILICAL HERNIA REPAIR;  Surgeon: Ra Crocker MD;  Location: [facility]     HERNIORRHAPHY UMBILICAL  8/20/2012    Procedure: HERNIORRHAPHY UMBILICAL;  UMBILICAL HERNIA REPAIR;  Surgeon: Ra Crocker MD;  Location: [facility]     ORTHOPEDIC SURGERY       ORTHOPEDIC SURGERY       OTHER SURGICAL HISTORY Right 03/2017    total KNEE ARTHROSCOPY      TOTAL KNEE ARTHROPLASTY Left 03/16/2018     [facility]  "NONSPECIFIC PROCEDURE      achilles tendon     [facility] NONSPECIFIC PROCEDURE      knees, arthroscopy     Z NONSPECIFIC PROCEDURE      basal cell skin ca, nose     Z NONSPECIFIC PROCEDURE      flex sig; colonoscopy due 3/2008     ZZ NONSPECIFIC PROCEDURE      cardioversion for atr fib     Z TOTAL KNEE ARTHROPLASTY Left 2018    Dr. Malick Suarez       FAMILY HISTORY:  Family History   Problem Relation Age of Onset     Family History Negative Mother      Heart Disease Father         decesased at 42 - MI     Heart Disease Sister          MI     Heart Disease Brother          MI     Heart Disease Brother         CABG AT 49     Lipids Sister      Lipids Sister      Lipids Sister      Family History Negative Brother        SOCIAL HISTORY:  Social History     Socioeconomic History     Marital status: Single     Spouse name: None     Number of children: None     Years of education: None     Highest education level: None   Tobacco Use     Smoking status: Former Smoker     Packs/day: 1.00     Years: 10.00     Pack years: 10.00     Types: Cigarettes     Start date:      Quit date: 2011     Years since quitting: 10.9     Smokeless tobacco: Never Used   Substance and Sexual Activity     Alcohol use: No     Drug use: No     Sexual activity: Yes     Partners: Female       Review of Systems:  Skin:          Eyes:         ENT:         Respiratory:          Cardiovascular:         Gastroenterology:        Genitourinary:         Musculoskeletal:         Neurologic:         Psychiatric:         Heme/Lymph/Imm:         Endocrine:           Physical Exam:  Vitals: BP (!) 140/82   Pulse 54   Ht 1.727 m (5' 8\")   Wt 126.6 kg (279 lb)   BMI 42.42 kg/m      Constitutional:  cooperative, alert and oriented, well developed, well nourished, in no acute distress        Skin:  warm and dry to the touch, no apparent skin lesions or masses noted          Head:  normocephalic, no masses or lesions    "     Eyes:  pupils equal and round;conjunctivae and lids unremarkable        Lymph:      ENT:           Neck:  carotid pulses are full and equal bilaterally, JVP normal, no carotid bruit        Respiratory:  normal breath sounds, clear to auscultation, normal A-P diameter, normal symmetry, normal respiratory excursion, no use of accessory muscles         Cardiac:   irregularly irregular rhythm   no presence of murmur          not assessed this visit                                        GI:  abdomen soft;BS normoactive obese      Extremities and Muscular Skeletal:  no deformities, clubbing, cyanosis, erythema observed;no edema;normal muscle strength and tone              Neurological:  affect appropriate   Right sided facial drooping present.    Psych:  Alert and Oriented x 3        CC  No referring provider defined for this encounter.                Service Date: 08/05/2022    HISTORY OF PRESENT ILLNESS:  Mr. Schultz is a 71-year-old gentleman that I am having the pleasure of meeting today.  He is followed by Crystal Campos PA-C and Dr. Day.      His past history includes:  1.  Persistent AFib.  EF in 01/2022 was 50%-55%.  He failed flecainide and underwent his first ablation (PVI and SVC isolation 11/2017).  He had a repeat PVI and CTI for inducible atrial flutter 04/2019.  He was started on amiodarone 11/11/2020, cardioverted on 11/19/2020 for recurrent AFib in the setting of increased alcohol consumption.  He was noted to be in persistent AFib 12/2021 and amiodarone was stopped.  It was decided to do rate control.  The patient states that he has been on Xarelto without interruption.  2.  Mild to moderate aortic stenosis, mean gradient 12.7 mmHg.  3.  Benign essential hypertension.  4.  Obesity.  5.  Obstructive sleep apnea.  6.  Myocardial scar noted on cardiac MRI 10/2017.  Consistent with prior MI in the left circumflex territory.  EF 51%.  7.  History of alcohol dependency.  No alcohol.  According to Crystal Campos  is noted in 2017.  8.  CHADS-VASc score of 2 or 3 (hypertension, age, and cardiac scar noted on cardiac MRI as noted above).  He is on chronic oral anticoagulation.    Mr. Schultz reports that he has not been feeling well.  He states that he has slowly been putting on weight.  He has noted increased shortness of breath, especially with exertion.  When he first went back into AFib, he states he was hiking in [place] and was unaware.  His watch actually told him he was in AFib.  It was at that time that he and Dr. Day discussed rate control strategy in 12/2021.  Followup Holter monitor showed controlled ventricular rate at 81 beats per minute on no AV enrrique blocking agents.  EF was 50%-55%.    He was diagnosed with Bell palsy 01/23/2022, given prednisone and acyclovir.  A CT of the head was negative for bleed.    He is here today to discuss upcoming cardioversion at the end of the month.  Crystal is placing him back on amiodarone at 400 mg b.i.d. for a week, then 200 mg b.i.d. for a week, then 200 mg daily thereafter.  Again, he has been on Xarelto 20 mg daily without interruption.    He was also started on hydrochlorothiazide in addition to losartan for his hypertension.  Pressure is slightly elevated at 140/82.  Weight is up to 279 pounds.  In addition to the hydrochlorothiazide, he also takes his 40 mg of Lasix on an as-needed basis.  He states he has not taken any for about 4 days.    He denies chest pain, orthopnea, PND, syncope.  He has a trace of lower extremity edema at best.  Lungs are clear on exam.      All other review of systems, past medical history and physical exam are noted below.    ASSESSMENT AND PLAN:    1.  Persistent atrial fibrillation.  The patient appears to be having symptoms of exertional shortness of breath.  This definitely could be multifactorial with obesity, being deconditioned.  The patient is hopeful not to be on amiodarone long.  I explained that he may be on this medication to help  maintain rhythm for some time.  He will not be able to stop it right after the cardioversion.  He will start amiodarone tonight.  I did a 12-lead EKG today, which showed atrial fibrillation with controlled ventricular rate at 87 beats per minute.  He is scheduled to follow up with Crystal Campos PA-C, in September.  At that time, they can discuss either repeat echo to assure that his ejection fraction remains stable and changing his antiarrhythmic or consideration for a third ablation.  Risks and benefits of the cardioversion were reviewed with the patient.  He will be n.p.o. after midnight.  He will not take hydrochlorothiazide and furosemide the morning of the procedure.  He will take his losartan and amiodarone.  He takes his Xarelto in the evening.  He must arrange transportation home from the hospital due to sedation used.  The patient understands and is willing to proceed.  Risks include but are not limited to:  superficial burn, intolerance from the anesthetics use, bradyarrhythmias and a rare incidence of stroke.  2.  Hypertension.  Pressure is still elevated.  Weight has been climbing.  I have encouraged him to continue to watch his diet.  I have ordered a BMP to assure potassium and creatinine are stable, especially since he has added back and low-dose furosemide.  The patient may need additional antihypertensive medications.  3.  History of aortic stenosis.  Mild to moderate on echo 2022.  Aortic valve area was 1.4 cm2 with a mean gradient of 12.7 mmHg.    Thank you for including us in his care.  If you have questions or concerns, please feel free to contact us.    Shanthi Zamora NP        D: 2022   T: 2022   MT: BRENNON    Name:     TOO HOWARD  MRN:      [id number]        Account:      [id number]   :      1950           Service Date: 2022       Document: [id number]    Thank you for allowing me to participate in the care of your patient.      Sincerely,     Shanthi Wilkins  NP, AYAN PAYAN     [facility]  cc:   Natalie Campos PA-C  [address]  [place] [id number]

## 2022-08-05 NOTE — PROGRESS NOTES
Service Date: 08/05/2022    HISTORY OF PRESENT ILLNESS:  Mr. Schultz is a 71-year-old gentleman that I am having the pleasure of meeting today.  He is followed by Crystal Campos PA-C and Dr. Day.      His past history includes:  1.  Persistent AFib.  EF in 01/2022 was 50%-55%.  He failed flecainide and underwent his first ablation (PVI and SVC isolation 11/2017).  He had a repeat PVI and CTI for inducible atrial flutter 04/2019.  He was started on amiodarone 11/11/2020, cardioverted on 11/19/2020 for recurrent AFib in the setting of increased alcohol consumption.  He was noted to be in persistent AFib 12/2021 and amiodarone was stopped.  It was decided to do rate control.  The patient states that he has been on Xarelto without interruption.  2.  Mild to moderate aortic stenosis, mean gradient 12.7 mmHg.  3.  Benign essential hypertension.  4.  Obesity.  5.  Obstructive sleep apnea.  6.  Myocardial scar noted on cardiac MRI 10/2017.  Consistent with prior MI in the left circumflex territory.  EF 51%.  7.  History of alcohol dependency.  No alcohol.  According to Crystal Campos is noted in 2017.  8.  CHADS-VASc score of 2 or 3 (hypertension, age, and cardiac scar noted on cardiac MRI as noted above).  He is on chronic oral anticoagulation.    Mr. Schultz reports that he has not been feeling well.  He states that he has slowly been putting on weight.  He has noted increased shortness of breath, especially with exertion.  When he first went back into AFib, he states he was hiking in [place] and was unaware.  His watch actually told him he was in AFib.  It was at that time that he and Dr. Day discussed rate control strategy in 12/2021.  Followup Holter monitor showed controlled ventricular rate at 81 beats per minute on no AV enrrique blocking agents.  EF was 50%-55%.    He was diagnosed with Bell palsy 01/23/2022, given prednisone and acyclovir.  A CT of the head was negative for bleed.    He is here today to discuss upcoming  cardioversion at the end of the month.  Crystal is placing him back on amiodarone at 400 mg b.i.d. for a week, then 200 mg b.i.d. for a week, then 200 mg daily thereafter.  Again, he has been on Xarelto 20 mg daily without interruption.    He was also started on hydrochlorothiazide in addition to losartan for his hypertension.  Pressure is slightly elevated at 140/82.  Weight is up to 279 pounds.  In addition to the hydrochlorothiazide, he also takes his 40 mg of Lasix on an as-needed basis.  He states he has not taken any for about 4 days.    He denies chest pain, orthopnea, PND, syncope.  He has a trace of lower extremity edema at best.  Lungs are clear on exam.      All other review of systems, past medical history and physical exam are noted below.    ASSESSMENT AND PLAN:    1.  Persistent atrial fibrillation.    The patient appears to be having symptoms of exertional shortness of breath.  This definitely could be multifactorial with obesity, being deconditioned.  The patient is hopeful not to be on amiodarone long.  I explained that he may be on this medication to help maintain rhythm for some time.  He will not be able to stop it right after the cardioversion.  He will start amiodarone tonight.  I did a 12-lead EKG today, which showed atrial fibrillation with controlled ventricular rate at 87 beats per minute.  He is scheduled to follow up with Crystal Campos PA-C, in September.  At that time, they can discuss either repeat echo to assure that his ejection fraction remains stable and changing his antiarrhythmic or consideration for a third ablation.  Risks and benefits of the cardioversion were reviewed with the patient.  He will be n.p.o. after midnight.  He will not take hydrochlorothiazide and furosemide the morning of the procedure.  He will take his losartan and amiodarone.  He takes his Xarelto in the evening.  He must arrange transportation home from the hospital due to sedation used.  The patient understands  and is willing to proceed.  Risks include but are not limited to:  superficial burn, intolerance from the anesthetics use, bradyarrhythmias and a rare incidence of stroke.    2.  Hypertension.    Pressure is still elevated.  Weight has been climbing.  I have encouraged him to continue to watch his diet.  I have ordered a BMP to assure potassium and creatinine are stable, especially since he has added back and low-dose furosemide.  The patient may need additional antihypertensive medications.    3.  History of aortic stenosis.    Mild to moderate on echo 2022.  Aortic valve area was 1.4 cm2 with a mean gradient of 12.7 mmHg.    Thank you for including us in his care.  If you have questions or concerns, please feel free to contact us.    Shanthi Wilkins NP        D: 2022   T: 2022   MT: BRENNON    Name:     TOO HOWARD  MRN:      [id number]        Account:      [id number]   :      1950           Service Date: 2022       Document: [id number]

## 2022-08-05 NOTE — PATIENT INSTRUCTIONS
Your blood pressure was elevated at your appointment today.  Elevated blood pressure can increase your risk of a heart attack, stroke and heart failure.  For this reason, we feel it is important to monitor your blood pressure closely.  If you have access to a home blood pressure monitor or are able to check your blood pressure at a local pharmacy in the next week we would like you to do so and call our clinic with those readings. Please call [phone] ([place]) and leave a message with your name, date of birth, blood pressure reading, date and location it was completed. If your blood pressure remains elevated your care team will be notified.  We appreciate being a part of your healthcare team and look forward to hearing from you soon.                  Call my nurse with any questions or concerns:  [phone]  *If you have concerns after hours, please call [phone], option 2 to speak with on call Cardiologist.    1. Medication changes from today:    Lab today  No diuretics morning of the cardioversion (furosemide and hydrochlorothiazide)

## 2022-08-06 DIAGNOSIS — L65.9 ALOPECIA: ICD-10-CM

## 2022-08-09 RX ORDER — FINASTERIDE 1 MG/1
TABLET, FILM COATED ORAL
Qty: 90 TABLET | Refills: 0 | Status: SHIPPED | OUTPATIENT
Start: 2022-08-09 | End: 2022-10-19

## 2022-08-09 NOTE — TELEPHONE ENCOUNTER
Routing refill request to provider for review/approval because:   Refill request is not for Imiquimod, 5-Fluorouracil, or Finasteride  Mayuri Aguayo RN

## 2022-08-19 ENCOUNTER — TELEPHONE (OUTPATIENT)
Dept: CARDIOLOGY | Facility: CLINIC | Age: 72
End: 2022-08-19

## 2022-08-19 NOTE — TELEPHONE ENCOUNTER
8/19/22 Called patient to review Cardioversion procedure prep instructions below.   ** No answer, left voicemail to call me back. Will review the following instructions:    Patient is scheduled for a Cardioversion (DCCV) at [facility] on 8/22/22, check-in time 8:30 am, procedure time tentatively 2 hours later, pending emergent cases.    Patient should have no food or drinks after midnight on Sunday, 8/21/22    Patient is taking Lasix and is not taking digoxin. Patient will not take Lasix  the morning of procedure, but can take when they get home.     Patient is not taking insulin.     Patient is not taking oral diabetic medications    Patient is taking Xarelto and has been taking daily uninterrupted for at least 21days.      Patient can take their other daily medications the morning of the procedure with small sips of water.     Pt stated he will be dropped off in the am and will be taking a taxi home. He stated he does have someone to stay with him x 24 hours once he arrives home  Left VM with Wilma, [facility] manager at [phone] to verify this is ok     Reviewed to check temperature in AM prior to arrival, if >100.0, call clinic. Pt will do home COVID test over weekend and bring picture of negative result on MOnday    Patient had no questions about the instructions.     Byron 1020 am

## 2022-08-22 ENCOUNTER — ANESTHESIA EVENT (OUTPATIENT)
Dept: SURGERY | Facility: CLINIC | Age: 72
End: 2022-08-22
Payer: COMMERCIAL

## 2022-08-22 ENCOUNTER — HOSPITAL ENCOUNTER (OUTPATIENT)
Dept: MEDSURG UNIT | Facility: CLINIC | Age: 72
Discharge: HOME | End: 2022-08-22
Attending: PHYSICIAN ASSISTANT
Payer: COMMERCIAL

## 2022-08-22 ENCOUNTER — HOSPITAL ENCOUNTER (OUTPATIENT)
Facility: CLINIC | Age: 72
Discharge: HOME | End: 2022-08-22
Admitting: INTERNAL MEDICINE
Payer: COMMERCIAL

## 2022-08-22 ENCOUNTER — ANESTHESIA (OUTPATIENT)
Dept: SURGERY | Facility: CLINIC | Age: 72
End: 2022-08-22
Payer: COMMERCIAL

## 2022-08-22 VITALS
WEIGHT: 277.7 LBS | OXYGEN SATURATION: 94 % | SYSTOLIC BLOOD PRESSURE: 124 MMHG | RESPIRATION RATE: 16 BRPM | HEART RATE: 84 BPM | HEIGHT: 68 IN | TEMPERATURE: 98.2 F | DIASTOLIC BLOOD PRESSURE: 87 MMHG | BODY MASS INDEX: 42.09 KG/M2

## 2022-08-22 DIAGNOSIS — I10 BENIGN ESSENTIAL HYPERTENSION: Primary | ICD-10-CM

## 2022-08-22 DIAGNOSIS — E87.6 HYPOKALEMIA: ICD-10-CM

## 2022-08-22 DIAGNOSIS — I48.19 PERSISTENT ATRIAL FIBRILLATION (H): ICD-10-CM

## 2022-08-22 LAB
CPB POCT: NO
HCO3 BLDV-SCNC: 33 MMOL/L (ref 21–28)
HCT VFR BLD CALC: 49 % (ref 40–53)
HGB BLD-MCNC: 16.7 G/DL (ref 13.3–17.7)
MAGNESIUM SERPL-MCNC: 1.8 MG/DL (ref 1.6–2.3)
PCO2 BLDV: 53 MM HG (ref 40–50)
PH BLDV: 7.4 [PH] (ref 7.32–7.43)
PO2 BLDV: 28 MM HG (ref 25–47)
POTASSIUM BLD-SCNC: 2.9 MMOL/L (ref 3.4–5.3)
POTASSIUM BLD-SCNC: 3.5 MMOL/L (ref 3.4–5.3)
POTASSIUM BLD-SCNC: 3.5 MMOL/L (ref 3.4–5.3)
SAO2 % BLDV: 51 % (ref 94–100)
SODIUM BLD-SCNC: 142 MMOL/L (ref 133–144)

## 2022-08-22 PROCEDURE — 999N000054 HC STATISTIC EKG NON-CHARGEABLE

## 2022-08-22 PROCEDURE — 36415 COLL VENOUS BLD VENIPUNCTURE: CPT

## 2022-08-22 PROCEDURE — 250N000013 HC RX MED GY IP 250 OP 250 PS 637: Performed by: INTERNAL MEDICINE

## 2022-08-22 PROCEDURE — 84132 ASSAY OF SERUM POTASSIUM: CPT

## 2022-08-22 PROCEDURE — 250N000009 HC RX 250: Performed by: NURSE ANESTHETIST, CERTIFIED REGISTERED

## 2022-08-22 PROCEDURE — 999N000010 HC STATISTIC ANES STAT CODE-CRNA PER MINUTE

## 2022-08-22 PROCEDURE — 250N000011 HC RX IP 250 OP 636: Performed by: NURSE ANESTHETIST, CERTIFIED REGISTERED

## 2022-08-22 PROCEDURE — 370N000017 HC ANESTHESIA TECHNICAL FEE, PER MIN

## 2022-08-22 PROCEDURE — 258N000003 HC RX IP 258 OP 636

## 2022-08-22 PROCEDURE — 83735 ASSAY OF MAGNESIUM: CPT

## 2022-08-22 PROCEDURE — 250N000011 HC RX IP 250 OP 636

## 2022-08-22 PROCEDURE — 93010 ELECTROCARDIOGRAM REPORT: CPT | Mod: 76 | Performed by: INTERNAL MEDICINE

## 2022-08-22 PROCEDURE — 250N000013 HC RX MED GY IP 250 OP 250 PS 637

## 2022-08-22 PROCEDURE — 82803 BLOOD GASES ANY COMBINATION: CPT

## 2022-08-22 PROCEDURE — 93005 ELECTROCARDIOGRAM TRACING: CPT

## 2022-08-22 PROCEDURE — 92960 CARDIOVERSION ELECTRIC EXT: CPT | Performed by: INTERNAL MEDICINE

## 2022-08-22 PROCEDURE — 92960 CARDIOVERSION ELECTRIC EXT: CPT

## 2022-08-22 RX ORDER — POTASSIUM CHLORIDE 1500 MG/1
40 TABLET, EXTENDED RELEASE ORAL
Status: DISCONTINUED | OUTPATIENT
Start: 2022-08-22 | End: 2022-08-22 | Stop reason: HOSPADM

## 2022-08-22 RX ORDER — PROPOFOL 10 MG/ML
INJECTION, EMULSION INTRAVENOUS PRN
Status: DISCONTINUED | OUTPATIENT
Start: 2022-08-22 | End: 2022-08-22

## 2022-08-22 RX ORDER — POTASSIUM CHLORIDE 1500 MG/1
20 TABLET, EXTENDED RELEASE ORAL
Status: DISCONTINUED | OUTPATIENT
Start: 2022-08-22 | End: 2022-08-22 | Stop reason: HOSPADM

## 2022-08-22 RX ORDER — ATROPINE SULFATE 0.1 MG/ML
.5-1 INJECTION INTRAVENOUS
Status: DISCONTINUED | OUTPATIENT
Start: 2022-08-22 | End: 2022-08-22 | Stop reason: HOSPADM

## 2022-08-22 RX ORDER — NALOXONE HYDROCHLORIDE 0.4 MG/ML
0.2 INJECTION, SOLUTION INTRAMUSCULAR; INTRAVENOUS; SUBCUTANEOUS
Status: DISCONTINUED | OUTPATIENT
Start: 2022-08-22 | End: 2022-08-22 | Stop reason: HOSPADM

## 2022-08-22 RX ORDER — FLUMAZENIL 0.1 MG/ML
0.2 INJECTION, SOLUTION INTRAVENOUS
Status: DISCONTINUED | OUTPATIENT
Start: 2022-08-22 | End: 2022-08-22 | Stop reason: HOSPADM

## 2022-08-22 RX ORDER — NALOXONE HYDROCHLORIDE 0.4 MG/ML
0.4 INJECTION, SOLUTION INTRAMUSCULAR; INTRAVENOUS; SUBCUTANEOUS
Status: DISCONTINUED | OUTPATIENT
Start: 2022-08-22 | End: 2022-08-22 | Stop reason: HOSPADM

## 2022-08-22 RX ORDER — POTASSIUM CHLORIDE 1.5 G/1.58G
20 POWDER, FOR SOLUTION ORAL ONCE
Status: COMPLETED | OUTPATIENT
Start: 2022-08-22 | End: 2022-08-22

## 2022-08-22 RX ORDER — SODIUM CHLORIDE 9 MG/ML
INJECTION, SOLUTION INTRAVENOUS CONTINUOUS
Status: DISCONTINUED | OUTPATIENT
Start: 2022-08-22 | End: 2022-08-22 | Stop reason: HOSPADM

## 2022-08-22 RX ORDER — POTASSIUM CHLORIDE 1500 MG/1
20 TABLET, EXTENDED RELEASE ORAL 2 TIMES DAILY
Qty: 30 TABLET | Refills: 0 | Status: SHIPPED | OUTPATIENT
Start: 2022-08-22 | End: 2022-09-09

## 2022-08-22 RX ORDER — MAGNESIUM SULFATE HEPTAHYDRATE 40 MG/ML
2 INJECTION, SOLUTION INTRAVENOUS
Status: DISCONTINUED | OUTPATIENT
Start: 2022-08-22 | End: 2022-08-22 | Stop reason: HOSPADM

## 2022-08-22 RX ORDER — ETOMIDATE 2 MG/ML
INJECTION INTRAVENOUS PRN
Status: DISCONTINUED | OUTPATIENT
Start: 2022-08-22 | End: 2022-08-22

## 2022-08-22 RX ADMIN — ETOMIDATE 4 MG: 2 INJECTION INTRAVENOUS at 11:54

## 2022-08-22 RX ADMIN — PROPOFOL 80 MG: 10 INJECTION, EMULSION INTRAVENOUS at 11:54

## 2022-08-22 RX ADMIN — SODIUM CHLORIDE: 9 INJECTION, SOLUTION INTRAVENOUS at 09:38

## 2022-08-22 RX ADMIN — POTASSIUM CHLORIDE 20 MEQ: 1.5 POWDER, FOR SOLUTION ORAL at 10:52

## 2022-08-22 RX ADMIN — MAGNESIUM SULFATE HEPTAHYDRATE 2 G: 40 INJECTION, SOLUTION INTRAVENOUS at 10:05

## 2022-08-22 RX ADMIN — POTASSIUM CHLORIDE 40 MEQ: 1500 TABLET, EXTENDED RELEASE ORAL at 10:06

## 2022-08-22 ASSESSMENT — ACTIVITIES OF DAILY LIVING (ADL)
ADLS_ACUITY_SCORE: 35

## 2022-08-22 ASSESSMENT — ENCOUNTER SYMPTOMS
ORTHOPNEA: 0
DYSRHYTHMIAS: 1

## 2022-08-22 ASSESSMENT — LIFESTYLE VARIABLES: TOBACCO_USE: 1

## 2022-08-22 NOTE — PRE-PROCEDURE
GENERAL PRE-PROCEDURE:   Procedure:  Cardioversion  Date/Time:  8/22/2022 11:36 AM    Verbal consent obtained?: Yes    Written consent obtained?: Yes    Risks and benefits: Risks, benefits and alternatives were discussed    Consent given by:  Patient  Patient states understanding of procedure being performed: Yes    Patient's understanding of procedure matches consent: Yes    Procedure consent matches procedure scheduled: Yes    Expected level of sedation:  Moderate  Appropriately NPO:  Yes  ASA Class:  3  Mallampati  :  Grade 3- soft palate visible, posterior pharyngeal wall not visible  Lungs:  Lungs clear with good breath sounds bilaterally  Heart:  Normal heart sounds and rate  History & Physical reviewed:  History and physical reviewed and no updates needed  Statement of review:  I have reviewed the lab findings, diagnostic data, medications, and the plan for sedation

## 2022-08-22 NOTE — PROGRESS NOTES
Got call from PRABHA Linares in [facility]. K was low as Brayden had been taking increased Lasix (typically takes PRN) and hydrochlorothiazide daily.    K was supplemented and DCCV was done.  Will have him continue Lasix and hydrochlorothiazide and start KCl 20 mEq BID.    New Rx sent in.    Will see 9/8 as planned and would expect we'd be able to stop the Lasix (typically only needs this when in AFib) and KCl.    Crystal Campos PA-C   August 22, 2022 at 2:06 PM

## 2022-08-22 NOTE — ANESTHESIA PREPROCEDURE EVALUATION
Anesthesia Pre-Procedure Evaluation    Patient: Mike Schultz   MRN: [id number] : 1950        Procedure : Procedure(s):  CARDIOVERSION          Past Medical History:   Diagnosis Date     H/O ETOH abuse      Herpes simplex without mention of complication      Hyperlipidemia      Obesity      Persistent atrial fibrillation (H)     persistent, DCCV 2017, ablation 17     Tobacco use disorder     dced 2004      Past Surgical History:   Procedure Laterality Date     ABLATION OF DYSRHYTHMIC FOCUS  , 2019    Procedure: EP Ablation Focal AFIB;  Surgeon: Fady Day MD;  Location:  [facility]     ACHILLES TENDON SURGERY       ANESTHESIA CARDIOVERSION N/A 2019    Procedure: ANESTHESIA CARDIOVERSION (CONNER);  Surgeon: GENERIC ANESTHESIA PROVIDER;  Location:  OR     ANESTHESIA CARDIOVERSION N/A 2020    Procedure: ANESTHESIA, FOR CARDIOVERSION (dr campbell);  Surgeon: GENERIC ANESTHESIA PROVIDER;  Location:  OR     BASAL CELL CARCINOMA EXCISION Right     nose, took cartilage from inner ear.     CARDIOVERSION  2019    cardioversion for atr fib     COLONOSCOPY       EP ABLATION FOCAL AFIB N/A 2019    Procedure: EP Ablation Focal AFIB;  Surgeon: Fady Day MD;  Location:  HEART [facility]ARD[facility] CATH LAB     HERNIA REPAIR, UMBILICAL  2012    Procedure: HERNIORRHAPHY UMBILICAL;  UMBILICAL HERNIA REPAIR;  Surgeon: Ra Crocker MD;  Location: [facility]     HERNIORRHAPHY UMBILICAL  2012    Procedure: HERNIORRHAPHY UMBILICAL;  UMBILICAL HERNIA REPAIR;  Surgeon: Ra Crocker MD;  Location: [facility]     ORTHOPEDIC SURGERY       ORTHOPEDIC SURGERY       OTHER SURGICAL HISTORY Right 2017    total KNEE ARTHROSCOPY      TOTAL KNEE ARTHROPLASTY Left 2018     ZZC NONSPECIFIC PROCEDURE      achilles tendon     ZZC NONSPECIFIC PROCEDURE      knees, arthroscopy     ZZC NONSPECIFIC PROCEDURE      basal cell skin ca, nose     ZZC NONSPECIFIC  PROCEDURE      flex sig; colonoscopy due 3/2008     ZZC NONSPECIFIC PROCEDURE  2012    cardioversion for atr fib     ZZC TOTAL KNEE ARTHROPLASTY Left 2018    Dr. Malick Suarez      Allergies   Allergen Reactions     No Known Drug Allergies       Social History     Tobacco Use     Smoking status: Former Smoker     Packs/day: 1.00     Years: 10.00     Pack years: 10.00     Types: Cigarettes     Start date:      Quit date: 2011     Years since quittin.0     Smokeless tobacco: Never Used   Substance Use Topics     Alcohol use: No      Wt Readings from Last 1 Encounters:   22 126 kg (277 lb 11.2 oz)        Anesthesia Evaluation   Pt has had prior anesthetic.         ROS/MED HX  ENT/Pulmonary:     (+) sleep apnea, doesn't use CPAP, allergic rhinitis, tobacco use, Past use,     Neurologic: Comment: bilat CTS, Bell's palsy       Cardiovascular: Comment: Ao dil, increased LV pressures     (+) Dyslipidemia hypertension--CAD -past MI --Taking blood thinners DELEON. dysrhythmias, a-fib and a-flutter, valvular problems/murmurs type: AS  (-) CHF, orthopnea/PND and syncope   METS/Exercise Tolerance:     Hematologic:       Musculoskeletal:       GI/Hepatic:    (-) GERD   Renal/Genitourinary:       Endo:     (+) Obesity,     Psychiatric/Substance Use:     (+) alcohol abuse     Infectious Disease:       Malignancy:       Other:            Physical Exam    Airway        Mallampati: III   TM distance: > 3 FB   Neck ROM: full   Mouth opening: > 3 cm    Respiratory Devices and Support         Dental       (+) caps      Cardiovascular          Rhythm and rate: regular     Pulmonary           breath sounds clear to auscultation           OUTSIDE LABS:  CBC:   Lab Results   Component Value Date    WBC 7.7 2022    WBC 9.8 2020    HGB 16.7 2022    HGB 16.1 2022    HCT 49 2022    HCT 49.4 2022     2022     2020     BMP:   Lab Results   Component Value Date      08/22/2022     08/05/2022    POTASSIUM 3.5 08/22/2022    POTASSIUM 3.5 08/22/2022    CHLORIDE 105 08/05/2022    CHLORIDE 104 02/28/2022    CO2 29 08/05/2022    CO2 28 02/28/2022    BUN 19 08/05/2022    BUN 27 02/28/2022    CR 0.97 08/05/2022    CR 1.03 02/28/2022    GLC 91 08/05/2022     (H) 02/28/2022     COAGS:   Lab Results   Component Value Date    INR 2.04 (H) 02/26/2019     POC: No results found for: BGM, HCG, HCGS  HEPATIC:   Lab Results   Component Value Date    ALBUMIN 3.6 06/30/2021    PROTTOTAL 7.1 06/30/2021    ALT 45 10/06/2021    AST 24 06/30/2021    ALKPHOS 87 06/30/2021    BILITOTAL 0.6 06/30/2021     OTHER:   Lab Results   Component Value Date    A1C 5.7 (H) 02/25/2019    ASHELY 8.8 08/05/2022    PHOS 3.1 09/21/2020    MAG 1.8 08/22/2022    TSH 2.22 06/30/2021    CRP <2.9 01/08/2021    SED 9 01/08/2021       Anesthesia Plan    ASA Status:  3      Anesthesia Type: General.              Consents    Anesthesia Plan(s) and associated risks, benefits, and realistic alternatives discussed. Questions answered and patient/representative(s) expressed understanding.    - Discussed:     - Discussed with:  Patient         Postoperative Care            Comments:           Prior to Admission medications    Medication Sig Start Date End Date Taking? Authorizing Provider   allopurinol (ZYLOPRIM) 300 MG tablet Take 1 tablet (300 mg) by mouth daily 6/9/22  Yes Loco Cummins MD   amiodarone (PACERONE) 200 MG tablet Take 2 tablets (400 mg) by mouth 2 times daily for 7 days, THEN 1 tablet (200 mg) 2 times daily for 7 days, THEN 1 tablet (200 mg) daily for 30 days. 8/4/22 9/17/22 Yes Natalie Campos PA-C   cetirizine (ZYRTEC) 10 MG tablet Take 10 mg by mouth as needed for allergies   Yes Reported, Patient   finasteride (PROPECIA) 1 MG tablet TAKE ONE TABLET BY MOUTH ONE TIME DAILY 8/9/22  Yes Loco Cummins MD   furosemide (LASIX) 20 MG tablet Take 20 mg by mouth as needed 8/5/22   Yes Shanthi Wilkins, APRN CNP   hydrochlorothiazide (HYDRODIURIL) 25 MG tablet Take 1 tablet (25 mg) by mouth daily 8/4/22  Yes Natalie Campos PA-C   losartan (COZAAR) 100 MG tablet Take 1 tablet (100 mg) by mouth daily. 1/31/22  Yes Loco Cummins MD   rivaroxaban ANTICOAGULANT (XARELTO ANTICOAGULANT) 20 MG TABS tablet Take 1 tablet (20 mg) by mouth daily (with dinner) 8/4/22  Yes Natalie Campos PA-C   valACYclovir (VALTREX) 1000 mg tablet Take 1 tablet (1,000 mg) by mouth 3 times daily for 7 days 1/24/22 1/31/22  Jovani Ozuna MD     Current Facility-Administered Medications Ordered in Epic   Medication Dose Route Frequency Last Rate Last Admin     atropine injection 0.5-1 mg  0.5-1 mg Intravenous Once PRN         flumazenil (ROMAZICON) injection 0.2 mg  0.2 mg Intravenous q1 min prn         magnesium sulfate 2 g in water intermittent infusion  2 g Intravenous Q1H PRN   2 g at 08/22/22 1005     naloxone (NARCAN) injection 0.2 mg  0.2 mg Intravenous Q2 Min PRN         naloxone (NARCAN) injection 0.2 mg  0.2 mg Intramuscular Q2 Min PRN         naloxone (NARCAN) injection 0.4 mg  0.4 mg Intravenous Q2 Min PRN         naloxone (NARCAN) injection 0.4 mg  0.4 mg Intramuscular Q2 Min PRN         potassium chloride ER (KLOR-CON M) CR tablet 20 mEq  20 mEq Oral Q1H PRN         potassium chloride ER (KLOR-CON M) CR tablet 40 mEq  40 mEq Oral Q1H PRN   40 mEq at 08/22/22 1006     sodium chloride (PF) 0.9% PF flush 3 mL  3 mL Intravenous Q1H PRN         sodium chloride (PF) 0.9% PF flush 3 mL  3 mL Intravenous Q8H PRN         sodium chloride 0.9% infusion   Intravenous Continuous 30 mL/hr at 08/22/22 0938 New Bag at 08/22/22 0938     No current [facility]-ordered outpatient medications on file.       sodium chloride 30 mL/hr at 08/22/22 0938     No results for input(s): ABO, RH in the last 42126 hours.  No results for input(s): HCG in the last 56840 hours.  No results found for this or any previous  visit (from the past 744 hour(s)).         Rachel Dia MD

## 2022-08-22 NOTE — PROGRESS NOTES
Text message to Crystal Campos about pt's PTA medications and KCL.  Crystal Campos called.    Pt will  his KCL at his [facility] pharmacy.   Pt will continue to  take his Lasix and hydrochlorothiazide until his next appointment with Crystal on Sept 8, 2022.   Pt is instructed above information.  Pt verbalized his understanding re: his medications.

## 2022-08-22 NOTE — ANESTHESIA CARE TRANSFER NOTE
Patient: Mike Schultz    Procedure: Procedure(s):  CARDIOVERSION       Diagnosis: A-fib (H) [I48.91]  Diagnosis Additional Information: No value filed.    Anesthesia Type:   No value filed.     Note:    Oropharynx: oropharynx clear of all foreign objects and spontaneously breathing  Level of Consciousness: awake  Oxygen Supplementation: nasal cannula  Level of Supplemental Oxygen (L/min / FiO2): 2  Independent Airway: airway patency satisfactory and stable  Dentition: dentition unchanged  Vital Signs Stable: post-procedure vital signs reviewed and stable  Report to RN Given: handoff report given  Patient transferred to: [facility]          Vitals:  Vitals Value Taken Time   /84 08/22/22 1204   Temp     Pulse 59 08/22/22 1209   Resp 15 08/22/22 1209   SpO2 94 % 08/22/22 1209   Vitals shown include unvalidated device data.    Electronically Signed By: Shobha Lanier  August 22, 2022  12:10 PM

## 2022-08-22 NOTE — DISCHARGE INSTRUCTIONS
Cardioversion Discharge Instructions    After you go home:       For 24 hours - due to the sedation you received:    Have an adult stay with you for 24 hours.   Relax and take it easy.  Do NOT make any important or legal decisions.  Do NOT drive or operate machines at home or at work.  Do NOT drink alcohol.    Diet:    Start with clear liquids and progress to your normal diet as you feel able.    Medicines:    Take your medications, including blood thinners, unless your provider tells you not to.  If you have stopped any medications, check with your provider about when to restart them.    Follow Up Appointments:    Follow up with your cardiologist at [facility] of patient preference as instructed.  Follow up with your primary care provider as needed.    Post cardioversion:    The skin on your chest or back may feel tender for 48 hours.  If your skin is tender, you may:    Use a cold pack on the site. Never use ice directly on your skin. Use the cold pack for 20 minutes. Remove it for at least 30 minutes before re-using.  Apply 1% hydrocortisone cream to the skin (sold at drug stores)  Take Advil (Ibuprofen) or Tylenol (Acetaminophen) per your provider's recommendations.      Call your provider if you have:    Weakness, dizziness, lightheadedness, or fainting.  Shortness of breath.  Irregular heartbeat, feelings of your heart fluttering or beating fast, hard or palpitations.   More than minor skin discomfort or redness where the cardioversion pads were placed.  Questions or concerns.      Call 911 if you have:    Pain in your chest, arm, shoulder, neck, or upper back.  You have problems speaking or seeing.  Weakness in your arm or leg.  You are unable to move your arm or leg.  You have uncontrolled bleeding.         [facility] [place]:    [phone] [facility] (7 days a week)

## 2022-08-22 NOTE — PROGRESS NOTES
[facility] Discharge Nursing Note    Patient Information  Name: Mike Schultz  Age: 71 year old    Discharge Education:  Discharge instructions reviewed: Yes  Additional education/resources provided: All questions answered  Patient/patient representative verbalizes understanding: Yes  Patient discharging on new medications: Yes  Medication education completed: Yes    Discharge Plans:   Discharge location: home  Discharge ride contacted: Yes  Approximate discharge time: 1505    Discharge Criteria:  Discharge criteria met and vital signs stable: Yes    Patient Belongs:  Patient belongings returned to patient: Yes    Norah Gonsalves RN

## 2022-08-22 NOTE — PROGRESS NOTES
[facility] Admission Nursing Note    Patient Information  Name: Mike Schultz  Age: 71 year old  Reason for admission: Cardioversion  [facility]es arrival time: 0840    Patient Admission/Assessment   Pre-procedure assessment complete: Yes  If abnormal assessment/labs, provider notified: Yes  NPO: Yes  Medications held per instructions/orders: Yes  Consent: obtained  Patient oriented to room: Yes  Education/questions answered: Yes  Plan/other: Replaced K+ redrew blood for new result, ok to proceed.    Discharge Planning  Discharge name/phone number: Significant other  Overnight post sedation caregiver: Katty  Discharge location: home    [facility] Procedure Nursing Note    Procedure: Cardioversion. Patient is alert and oriented. Has not missed any doses of anticoagulant. Pt denies difficulty swallowing or sleep apnea. No dentures or loose teeth. Patient has been NPO sufficient amount of time. Discharge / post procedure instructions given to pt w/ verbal understanding received.  All questions & concerns addressed.    1155- MD Sedation Assessment completed. Consent signed at this time. Time Out done at this time.    CDV: anesthesia team arrived for DCCV, Propofol used for sedation while airway was managed by CRNA. Cardiologist gave one shock @ 200 joules and patient converted to SR. See rhythm strips.    1505- Pt discharged per w/c to private vehicle with friend driving. All personal belongings sent with pt.   Pt had sufficient recovery time. Resp even & unlabored upon transfer. Patient has stable vital signs.   Procedure start time: 0840  Procedure complete time: 1230  Concerns/abnormal assessment: Low potassium level, MD informed, orders received.  If abnormal assessment, provider notified: Yes  Plan/Other: Discharge to home when responsible  can pick him up.    Norah Gonsalves RN

## 2022-08-22 NOTE — PROCEDURES
[facility]    Procedure: *Cardioversion    Date/Time: 8/22/2022 12:12 PM  Performed by: Rebecca Houston MD  Authorized by: Rebecca Houston MD       UNIVERSAL PROTOCOL   Site Marked: Yes  Prior Images Obtained and Reviewed:  Yes  Required items: Required blood products, implants, devices and special equipment available    Patient identity confirmed:  Verbally with patient  Patient was reevaluated immediately before administering moderate or deep sedation or anesthesia  Confirmation Checklist:  Patient's identity using two indicators  Time out: Immediately prior to the procedure a time out was called    Universal Protocol: the Joint Commission Universal Protocol was followed       ANESTHESIA  Anesthesia was administered and monitored by anesthesiology.  See anesthesia documentation for details.    SEDATION  Patient Sedated: Yes    Sedation:  Propofol  Vital signs: Vital signs monitored during sedation      PROCEDURE DETAILS  Cardioversion basis: elective  Pre-procedure rhythm: atrial fibrillation  Patient position: patient was placed in a supine position  Chest area: chest area exposed  Electrodes: pads  Electrodes placed: anterior-posterior  Number of attempts: 1  Post-procedure rhythm: normal sinus rhythm  Complications: no complications      PROCEDURE  Describe Procedure: Consent obtained and anticoagulation status confirmed. Potassium pre-procedure was 2.8 thus procedure delayed one hour for adequate potassium repletion. Patient was noted to be on hydrochlorothiazide and lasix. Recommended stopping hydrochlorothiazide and following up with Crystal Campos for blood pressure medication recommendations. Total of 60 meq potassium given and subsequent value reported back at 3.5. Procedure was performed successfully with one shock at 200 J. No immediate complications.   Patient Tolerance:  Patient tolerated the procedure well with no immediate complications

## 2022-08-22 NOTE — ANESTHESIA POSTPROCEDURE EVALUATION
Patient: Mike Schultz    Procedure: Procedure(s):  CARDIOVERSION       Anesthesia Type:  General    Note:     Postop Pain Control: Uneventful            Sign Out: Well controlled pain   PONV: No   Neuro/Psych: Uneventful            Sign Out: Acceptable/Baseline neuro status   Airway/Respiratory: Uneventful            Sign Out: Acceptable/Baseline resp. status   CV/Hemodynamics: Uneventful            Sign Out: Acceptable CV status; No obvious hypovolemia; No obvious fluid overload   Other NRE: NONE   DID A NON-ROUTINE EVENT OCCUR? No           Last vitals:  Vitals:    08/22/22 1202 08/22/22 1204 08/22/22 1210   BP: (!) 155/87 (!) 152/84 126/85   Pulse: 75 68 60   Resp: 13 23 12   Temp:      SpO2: 99% 98% 95%       Electronically Signed By: Rachel Dia MD  August 22, 2022  12:16 PM

## 2022-08-23 NOTE — PROGRESS NOTES
8/23/22 Spoke w pt in follow up to [facility] yesterday. Pt feels well and has started KCL 20 meq BID . Reminded of follow up w Crystal Campos PA-C on 9/8 at 1030   Pt voiced understanding and agreement with plan.   Byron 210 pm

## 2022-08-25 LAB
ATRIAL RATE - MUSE: 100 BPM
ATRIAL RATE - MUSE: 63 BPM
DIASTOLIC BLOOD PRESSURE - MUSE: NORMAL MMHG
DIASTOLIC BLOOD PRESSURE - MUSE: NORMAL MMHG
INTERPRETATION ECG - MUSE: NORMAL
INTERPRETATION ECG - MUSE: NORMAL
P AXIS - MUSE: 60 DEGREES
P AXIS - MUSE: NORMAL DEGREES
PR INTERVAL - MUSE: 236 MS
PR INTERVAL - MUSE: NORMAL MS
QRS DURATION - MUSE: 96 MS
QRS DURATION - MUSE: 98 MS
QT - MUSE: 486 MS
QT - MUSE: 502 MS
QTC - MUSE: 513 MS
QTC - MUSE: 554 MS
R AXIS - MUSE: 72 DEGREES
R AXIS - MUSE: 76 DEGREES
SYSTOLIC BLOOD PRESSURE - MUSE: NORMAL MMHG
SYSTOLIC BLOOD PRESSURE - MUSE: NORMAL MMHG
T AXIS - MUSE: 136 DEGREES
T AXIS - MUSE: 98 DEGREES
VENTRICULAR RATE- MUSE: 63 BPM
VENTRICULAR RATE- MUSE: 78 BPM

## 2022-08-29 DIAGNOSIS — M10.9 GOUT OF FOOT, UNSPECIFIED CAUSE, UNSPECIFIED CHRONICITY, UNSPECIFIED LATERALITY: ICD-10-CM

## 2022-08-31 RX ORDER — ALLOPURINOL 300 MG/1
300 TABLET ORAL DAILY
Qty: 90 TABLET | Refills: 0 | Status: SHIPPED | OUTPATIENT
Start: 2022-08-31 | End: 2022-12-02

## 2022-08-31 NOTE — TELEPHONE ENCOUNTER
Medication filled 1 time as pt is due for a follow-up in clinic. Pharmacy has been notified to inform patient to call clinic and schedule appointment.     Prescription approved per [facility] Refill Protocol.  Mayuri Aguayo RN

## 2022-09-07 ENCOUNTER — TELEPHONE (OUTPATIENT)
Dept: CARDIOLOGY | Facility: CLINIC | Age: 72
End: 2022-09-07

## 2022-09-07 ENCOUNTER — LAB (OUTPATIENT)
Dept: LAB | Facility: CLINIC | Age: 72
End: 2022-09-07
Attending: PHYSICIAN ASSISTANT
Payer: COMMERCIAL

## 2022-09-07 DIAGNOSIS — I48.0 PAROXYSMAL ATRIAL FIBRILLATION (H): ICD-10-CM

## 2022-09-07 DIAGNOSIS — I48.0 PAROXYSMAL ATRIAL FIBRILLATION (H): Primary | ICD-10-CM

## 2022-09-07 PROCEDURE — 80048 BASIC METABOLIC PNL TOTAL CA: CPT

## 2022-09-07 PROCEDURE — 36415 COLL VENOUS BLD VENIPUNCTURE: CPT

## 2022-09-07 NOTE — PROGRESS NOTES
"[facility]    I had the pleasure of seeing Brayden when he came for follow up of AFib.  This 71 year old sees Dr. Day for his history of:    1. Now persistent AFib. EF 50-55% 1/2022 - first dx'd in 2012. Failed flecainide (and noted scar on MRI). Underwent PVI and SVC isolation 11/2017. Repeat PVI and CTI for inducible AFlutter 4/2019. Started on Amio with DCCV 11/19/2020 for recurrent AFib in setting of increased alcohol use.   Amiodarone decreased 1/2021; back in persistent AFib 12/2021 and amiodarone stopped.  This was restarted 8/2022, now s/p DCCV 8/22/2022.  2. Mild-moderate Aortic Stenosis - echo 1/2022 with mean gradient 12.7 mmHg  3. Benign Essential HTN   4. BOWEN   5. Myocardiac Scar noted on cardiac MRI 10/2017, consistent with prior MI in the left Cx territory. EF 51%.  Avoiding flecainide use  6. H/o Alcohol dependence - no EtOH since 2017, then again 3-4 months in early 2022. Now no EtOH since 8/2022  7. Chronic AC  On Xarelto for CHADSVASc 2 (HTN, age)  8. Bell's Palsy    I saw Brayden 2/2022 at which time he reviewed that he remained in AFib since 12/2021, but remained feeling \"great\" without any evidence of fluid overload, which was typically his sx when AFib.  He continues to abstain from alcohol at that time.  I recommended starting HCTZ for BP control and routine follow-up recommended.    He contacted us 8/3 noting that he was starting to retain some fluid.  I started him on amiodarone load, noting that it would be difficult to get him back into SR after ~8m of AFib.  He saw Shanthi Wilkins NP 8/5/2022 and noted he had not been feeling well, c/o increasing DELEON and weight gain.  He was only taking furosemide PRN.    He underwent DCCV 8/22/2022.  K was low and replaced.  HCTZ was stopped.  Successful DCCV with 1 shock, back to SR 60 bpm.  I am now seeing him today    Interval History:  Since restoring SR, he feels \"much better.\"  He's still noted some DELEON and notes lower energy levels " "and resultant weight gain.    Still thinks he's retaining fluid - he has some mild orthostasis but no PND. He's taking furosemide 2-3 times/week only. He'd previously been on this daily but once SR was restored last year, he didn't need it any more.     After he went back into AFib in 12/2021 he admits he gave himself a \"license\" to start drinking alcohol again. He got back up to 1 pint/night. Stopped again ~4 weeks ago (8/1/2022)    Notes weight has dropped some. Working out twice a day but \"not as intense\" as he was previously.  Limits sodium in diet.    Thinks Moville Palsy is ~90% better.    VITALS:  Vitals: /79 (BP Location: Left arm, Cuff Size: Adult Large)   Pulse 74   Ht 1.727 m (5' 8\")   Wt 125.2 kg (276 lb)   BMI 41.97 kg/m      Diagnostic Testing:  EKG today, which I overread, showed SB 51 bpm. Nonspecific STTW changes  Echo 1/10/52577 showed EF 50-55% LVSF mildly reduced. No RWMA. Nl RV. LA mildly dilated 4.1 cm with volume index 35.7ml/m2. Nl MV. Trace TR. Trace AI. MARILYN 1.4 cm2 with mean gradient 12.7mmHg c/w mild-mod AoS. Asc aorta mildly dilated 4.1 cm  Holter Monitor 12/29/2021 on no AVn blocking agents showed AFib with avg HR 81 bpm (range 44--143 bpm). 2% PVC burden  EKG 7/2021 showed sinus bradycardia 45 bpm.  Nonspecific ST-T wave changes  Echocardiogram 3/2019 showed EF of 55-60%.  No regional wall motion abnormalities were noted.  He had 1+ mitral regurgitation and 1+ tricuspid regurgitation.  RV was normal in structure, function and size.  Aortic valve showed just trace to mild aortic regurgitation with aortic sclerosis.  Mean gradient was 4.6 mmHg (improved from previous) volume index is 46.7 mL/m  with LA dimension of 4.9 cm.  Cardiac MRI 10/2017 showed an EF of 51% with mild hypokinesis involving the basal inferolateral segment.  Evidence of previous myocardial infarction in the circumflex territory was noted.  Component      Latest Ref Rng & Units 2/28/2022 8/5/2022 9/7/2022 "   Sodium      133 - 144 mmol/L 137 140 139   Potassium      3.4 - 5.3 mmol/L 3.5 3.5 3.6   Chloride      94 - 109 mmol/L 104 105 105   Carbon Dioxide      20 - 32 mmol/L 28 29 27   Anion Gap      3 - 14 mmol/L 5 6 7   Urea Nitrogen      7 - 30 mg/dL 27 19 23   Creatinine      0.66 - 1.25 mg/dL 1.03 0.97 1.10   Calcium      8.5 - 10.1 mg/dL 8.5 8.8 8.7   Glucose      70 - 99 mg/dL 137 (H) 91 87   GFR Estimate      >60 mL/min/1.73m2 78 84 72         Plan:  1. Stop hydrochlorothiazide  2. Start Lasix 20 mg daily  3. Will determine KCl dose after review of BMP  (still pending)  4. See me ~6 weeks with amiodarone labs and echo    Assessment/Plan:    1. Atrial Fibrillation    Now s/p DCCV 8/22/2022    EKG with SB - this has been an issue on amiodarone in the past. He's on no other AVN blocking agents    PLAN:    Continue amiodarone for now    Amio labs 6 weeks    See me ~6 weeks with amiodarone labs and updated echo    Abstain from EtOH    2. HTN    Remains on Lasix 20 mg PRN    Has hydrochlorothiazide daily.     Now with more swelling    PLAN:    Reviewed BMP after clinic - MyChart message sent    Stop hydrochlorothiazide 25 mg daily    Lasix 20 mg daily    KCl 20 mEq BID (refill sent)         Crystal Campos PA-C, MSPAS      Orders Placed This Encounter   Procedures     Basic metabolic panel     Hepatic panel     TSH with free T4 reflex     Follow-Up with Cardiology OTTO     EKG 12-lead complete w/read - Clinics (performed today)     EKG 12-lead complete w/read - Clinics (to be scheduled)     Echocardiogram Complete     Orders Placed This Encounter   Medications     rivaroxaban ANTICOAGULANT (XARELTO ANTICOAGULANT) 20 MG TABS tablet     Sig: Take 1 tablet (20 mg) by mouth daily (with dinner)     Dispense:  90 tablet     Refill:  3     Keep on file until pt is due     furosemide (LASIX) 20 MG tablet     Sig: Take 1 tablet (20 mg) by mouth daily     amiodarone (PACERONE) 200 MG tablet     Sig: Take 1 tablet (200 mg) by mouth  daily     Medications Discontinued During This Encounter   Medication Reason     valACYclovir (VALTREX) 1000 mg tablet Therapy completed     hydrochlorothiazide (HYDRODIURIL) 25 MG tablet      rivaroxaban ANTICOAGULANT (XARELTO ANTICOAGULANT) 20 MG TABS tablet Reorder     amiodarone (PACERONE) 200 MG tablet      furosemide (LASIX) 20 MG tablet Reorder         Encounter Diagnoses   Name Primary?     Persistent atrial fibrillation (H) Yes     On amiodarone therapy        CURRENT MEDICATIONS:  Current Outpatient Medications   Medication Sig Dispense Refill     allopurinol (ZYLOPRIM) 300 MG tablet Take 1 tablet (300 mg) by mouth daily 90 tablet 0     amiodarone (PACERONE) 200 MG tablet Take 1 tablet (200 mg) by mouth daily       cetirizine (ZYRTEC) 10 MG tablet Take 10 mg by mouth as needed for allergies       finasteride (PROPECIA) 1 MG tablet TAKE ONE TABLET BY MOUTH ONE TIME DAILY 90 tablet 0     furosemide (LASIX) 20 MG tablet Take 1 tablet (20 mg) by mouth daily       losartan (COZAAR) 100 MG tablet Take 1 tablet (100 mg) by mouth daily. 90 tablet 2     potassium chloride ER (KLOR-CON M) 20 MEQ CR tablet Take 1 tablet (20 mEq) by mouth 2 times daily 30 tablet 0     rivaroxaban ANTICOAGULANT (XARELTO ANTICOAGULANT) 20 MG TABS tablet Take 1 tablet (20 mg) by mouth daily (with dinner) 90 tablet 3       ALLERGIES     Allergies   Allergen Reactions     No Known Drug Allergies          Review of Systems:  Skin:  Negative     Eyes:  Negative    ENT:  Negative    Respiratory:  Negative for sleep apnea;dyspnea on exertion  Cardiovascular:  Negative for;palpitations;chest pain;exercise intolerance edema;Positive for  Gastroenterology: Negative for melena;hematochezia  Genitourinary:  Positive for nocturia  Musculoskeletal:  Negative for muscular weakness  Neurologic:  Positive for    Psychiatric:  Negative    Heme/Lymph/Imm:  Negative    Endocrine:  Negative      Physical Exam:  Vitals: /79 (BP Location: Left arm, Cuff  "Size: Adult Large)   Pulse 74   Ht 1.727 m (5' 8\")   Wt 125.2 kg (276 lb)   BMI 41.97 kg/m      Constitutional:  cooperative, alert and oriented, well developed, well nourished, in no acute distress        Skin:  warm and dry to the touch, no apparent skin lesions or masses noted        Head:  normocephalic, no masses or lesions        Eyes:  pupils equal and round;conjunctivae and lids unremarkable   R facial weakness much improved 9/2022    ENT:           Neck:  JVP normal        Chest:  normal breath sounds, clear to auscultation, normal A-P diameter, normal symmetry, normal respiratory excursion, no use of accessory muscles        Cardiac: regular rhythm bradycardic   no presence of murmur            Abdomen:  abdomen soft obese      Vascular: pulses full and equal                                      Extremities and Back:  no deformities, clubbing, cyanosis, erythema observed;normal muscle strength and tone        Neurological:  affect appropriate   Right sided facial drooping present, no I have to check when I need Dahiya for her trip - inproved        PAST MEDICAL HISTORY:  Past Medical History:   Diagnosis Date     H/O ETOH abuse      Herpes simplex without mention of complication      Hyperlipidemia      Obesity      Persistent atrial fibrillation (H)     persistent, DCCV 12/2017, ablation 11/6/17     Tobacco use disorder     dced 6/2004       PAST SURGICAL HISTORY:  Past Surgical History:   Procedure Laterality Date     ABLATION OF DYSRHYTHMIC FOCUS  2018, 04/12/2019    Procedure: EP Ablation Focal AFIB;  Surgeon: Fady Day MD;  Location:  [facility]     ACHILLES TENDON SURGERY  1997     ANESTHESIA CARDIOVERSION N/A 2/26/2019    Procedure: ANESTHESIA CARDIOVERSION (CONNER);  Surgeon: GENERIC ANESTHESIA PROVIDER;  Location:  OR     ANESTHESIA CARDIOVERSION N/A 11/19/2020    Procedure: ANESTHESIA, FOR CARDIOVERSION (dr campbell);  Surgeon: GENERIC ANESTHESIA PROVIDER;  Location:  OR "     ANESTHESIA CARDIOVERSION N/A 2022    Procedure: CARDIOVERSION;  Surgeon: GENERIC ANESTHESIA PROVIDER;  Location:  OR     BASAL CELL CARCINOMA EXCISION Right     nose, took cartilage from inner ear.     CARDIOVERSION  2019    cardioversion for atr fib     COLONOSCOPY       EP ABLATION FOCAL AFIB N/A 2019    Procedure: EP Ablation Focal AFIB;  Surgeon: Fady Day MD;  Location:  [facility]     HERNIA REPAIR, UMBILICAL  2012    Procedure: HERNIORRHAPHY UMBILICAL;  UMBILICAL HERNIA REPAIR;  Surgeon: Ra Crocker MD;  Location: [facility]     HERNIORRHAPHY UMBILICAL  2012    Procedure: HERNIORRHAPHY UMBILICAL;  UMBILICAL HERNIA REPAIR;  Surgeon: Ra Crocker MD;  Location: [facility]     ORTHOPEDIC SURGERY       ORTHOPEDIC SURGERY       OTHER SURGICAL HISTORY Right 2017    total KNEE ARTHROSCOPY      TOTAL KNEE ARTHROPLASTY Left 2018     ZZC NONSPECIFIC PROCEDURE  1997    achilles tendon     ZZC NONSPECIFIC PROCEDURE      knees, arthroscopy     ZZC NONSPECIFIC PROCEDURE      basal cell skin ca, nose     ZZC NONSPECIFIC PROCEDURE      flex sig; colonoscopy due 3/2008     ZZC NONSPECIFIC PROCEDURE      cardioversion for atr fib     ZZC TOTAL KNEE ARTHROPLASTY Left 2018    Dr. Malick Suarez       FAMILY HISTORY:  Family History   Problem Relation Age of Onset     Family History Negative Mother      Heart Disease Father         decesased at 42 - MI     Heart Disease Sister          MI     Heart Disease Brother          MI     Heart Disease Brother         CABG AT 49     Lipids Sister      Lipids Sister      Lipids Sister      Family History Negative Brother        SOCIAL HISTORY:  Social History     Socioeconomic History     Marital status: Single     Spouse name: None     Number of children: None     Years of education: None     Highest education level: None   Tobacco Use     Smoking status: Former Smoker     Packs/day: 1.00      Years: 10.00     Pack years: 10.00     Types: Cigarettes     Start date:      Quit date: 2011     Years since quittin.0     Smokeless tobacco: Never Used   Substance and Sexual Activity     Alcohol use: No     Drug use: No     Sexual activity: Yes     Partners: Female

## 2022-09-07 NOTE — TELEPHONE ENCOUNTER
9/7/22 Msg recd from Crystal Campos PA-C  Patient had cardioversion 8/22 and it looks like HCTZ was stopped?  I do not think he was called the following day, so unsure of what he is currently taking.     Please call to get updated med list.  Please get BMP before I see him Thursday if possible       Attempted to call pt but reached SYED. TIFFANIE and requested callback. Order placed for BMP, messaged scheduling to try and contact pt today as well. Byron 1115 am

## 2022-09-08 ENCOUNTER — OFFICE VISIT (OUTPATIENT)
Dept: CARDIOLOGY | Facility: CLINIC | Age: 72
End: 2022-09-08
Payer: COMMERCIAL

## 2022-09-08 VITALS
BODY MASS INDEX: 41.83 KG/M2 | HEART RATE: 74 BPM | HEIGHT: 68 IN | SYSTOLIC BLOOD PRESSURE: 131 MMHG | DIASTOLIC BLOOD PRESSURE: 79 MMHG | WEIGHT: 276 LBS

## 2022-09-08 DIAGNOSIS — I48.19 PERSISTENT ATRIAL FIBRILLATION (H): Primary | ICD-10-CM

## 2022-09-08 DIAGNOSIS — Z79.899 ON AMIODARONE THERAPY: ICD-10-CM

## 2022-09-08 LAB
ANION GAP SERPL CALCULATED.3IONS-SCNC: 7 MMOL/L (ref 3–14)
BUN SERPL-MCNC: 23 MG/DL (ref 7–30)
CALCIUM SERPL-MCNC: 8.7 MG/DL (ref 8.5–10.1)
CHLORIDE BLD-SCNC: 105 MMOL/L (ref 94–109)
CO2 SERPL-SCNC: 27 MMOL/L (ref 20–32)
CREAT SERPL-MCNC: 1.1 MG/DL (ref 0.66–1.25)
GFR SERPL CREATININE-BSD FRML MDRD: 72 ML/MIN/1.73M2
GLUCOSE BLD-MCNC: 87 MG/DL (ref 70–99)
POTASSIUM BLD-SCNC: 3.6 MMOL/L (ref 3.4–5.3)
SODIUM SERPL-SCNC: 139 MMOL/L (ref 133–144)

## 2022-09-08 PROCEDURE — 99214 OFFICE O/P EST MOD 30 MIN: CPT | Performed by: PHYSICIAN ASSISTANT

## 2022-09-08 PROCEDURE — 93000 ELECTROCARDIOGRAM COMPLETE: CPT | Performed by: PHYSICIAN ASSISTANT

## 2022-09-08 RX ORDER — FUROSEMIDE 20 MG
20 TABLET ORAL DAILY
Start: 2022-09-08 | End: 2023-05-17

## 2022-09-08 RX ORDER — AMIODARONE HYDROCHLORIDE 200 MG/1
200 TABLET ORAL DAILY
COMMUNITY
Start: 2022-09-08 | End: 2022-09-14

## 2022-09-08 NOTE — PATIENT INSTRUCTIONS
Brayden  - it was good to see you today!    EKG showed normal rhythm - just a little slow  oted lack of energy and weight gain      PLAN:  1. STOP hydrochlorothiazide    2. Take furosemide 20 mg DAILY (instead of just every few days) to keep getting fluid off of you as your heart figures out normal rhythm again    3. Will figure out potassium dose and let you know once I see all of the blood work - we'll send in Rx for potassium when we get the results    4. No alcohol   5. See me 6 weeks with repeat blood work and echo    [phone]

## 2022-09-08 NOTE — LETTER
"9/8/2022    Loco Cummins MD  [address] [place] [id number]    RE: Mike Schultz       Dear Colleague,     I had the pleasure of seeing Mike Schultz in the [facility].  [facility]    I had the pleasure of seeing Brayden when he came for follow up of AFib.  This 71 year old sees Dr. Day for his history of:    1. Now persistent AFib. EF 50-55% 1/2022 - first dx'd in 2012. Failed flecainide (and noted scar on MRI). Underwent PVI and SVC isolation 11/2017. Repeat PVI and CTI for inducible AFlutter 4/2019. Started on Amio with DCCV 11/19/2020 for recurrent AFib in setting of increased alcohol use.   Amiodarone decreased 1/2021; back in persistent AFib 12/2021 and amiodarone stopped.  This was restarted 8/2022, now s/p DCCV 8/22/2022.  2. Mild-moderate Aortic Stenosis - echo 1/2022 with mean gradient 12.7 mmHg  3. Benign Essential HTN   4. BOWEN   5. Myocardiac Scar noted on cardiac MRI 10/2017, consistent with prior MI in the left Cx territory. EF 51%.  Avoiding flecainide use  6. H/o Alcohol dependence - no EtOH since 2017, then again 3-4 months in early 2022. Now no EtOH since 8/2022  7. Chronic AC  On Xarelto for CHADSVASc 2 (HTN, age)  8. Bell's Palsy    I saw Brayden 2/2022 at which time he reviewed that he remained in AFib since 12/2021, but remained feeling \"great\" without any evidence of fluid overload, which was typically his sx when AFib.  He continues to abstain from alcohol at that time.  I recommended starting HCTZ for BP control and routine follow-up recommended.    He contacted us 8/3 noting that he was starting to retain some fluid.  I started him on amiodarone load, noting that it would be difficult to get him back into SR after ~8m of AFib.  He saw Shanthi Wilkins NP 8/5/2022 and noted he had not been feeling well, c/o increasing DELEON and weight gain.  He was only taking furosemide PRN.    He underwent DCCV 8/22/2022.  K was low and replaced.  HCTZ was " "stopped.  Successful DCCV with 1 shock, back to SR 60 bpm.  I am now seeing him today    Interval History:  Since restoring SR, he feels \"much better.\"  He's still noted some DELEON and notes lower energy levels and resultant weight gain.    Still thinks he's retaining fluid - he has some mild orthostasis but no PND. He's taking furosemide 2-3 times/week only. He'd previously been on this daily but once SR was restored last year, he didn't need it any more.     After he went back into AFib in 12/2021 he admits he gave himself a \"license\" to start drinking alcohol again. He got back up to 1 pint/night. Stopped again ~4 weeks ago (8/1/2022)    Notes weight has dropped some. Working out twice a day but \"not as intense\" as he was previously.  Limits sodium in diet.    Thinks Mosier Palsy is ~90% better.    VITALS:  Vitals: /79 (BP Location: Left arm, Cuff Size: Adult Large)   Pulse 74   Ht 1.727 m (5' 8\")   Wt 125.2 kg (276 lb)   BMI 41.97 kg/m      Diagnostic Testing:  EKG today, which I overread, showed SB 51 bpm. Nonspecific STTW changes  Echo 1/10/93280 showed EF 50-55% LVSF mildly reduced. No RWMA. Nl RV. LA mildly dilated 4.1 cm with volume index 35.7ml/m2. Nl MV. Trace TR. Trace AI. MARILYN 1.4 cm2 with mean gradient 12.7mmHg c/w mild-mod AoS. Asc aorta mildly dilated 4.1 cm  Holter Monitor 12/29/2021 on no AVn blocking agents showed AFib with avg HR 81 bpm (range 44--143 bpm). 2% PVC burden  EKG 7/2021 showed sinus bradycardia 45 bpm.  Nonspecific ST-T wave changes  Echocardiogram 3/2019 showed EF of 55-60%.  No regional wall motion abnormalities were noted.  He had 1+ mitral regurgitation and 1+ tricuspid regurgitation.  RV was normal in structure, function and size.  Aortic valve showed just trace to mild aortic regurgitation with aortic sclerosis.  Mean gradient was 4.6 mmHg (improved from previous) volume index is 46.7 mL/m  with LA dimension of 4.9 cm.  Cardiac MRI 10/2017 showed an EF of 51% with mild " hypokinesis involving the basal inferolateral segment.  Evidence of previous myocardial infarction in the circumflex territory was noted.  Component      Latest Ref Rng & Units 2/28/2022 8/5/2022 9/7/2022   Sodium      133 - 144 mmol/L 137 140 139   Potassium      3.4 - 5.3 mmol/L 3.5 3.5 3.6   Chloride      94 - 109 mmol/L 104 105 105   Carbon Dioxide      20 - 32 mmol/L 28 29 27   Anion Gap      3 - 14 mmol/L 5 6 7   Urea Nitrogen      7 - 30 mg/dL 27 19 23   Creatinine      0.66 - 1.25 mg/dL 1.03 0.97 1.10   Calcium      8.5 - 10.1 mg/dL 8.5 8.8 8.7   Glucose      70 - 99 mg/dL 137 (H) 91 87   GFR Estimate      >60 mL/min/1.73m2 78 84 72         Plan:  1. Stop hydrochlorothiazide  2. Start Lasix 20 mg daily  3. Will determine KCl dose after review of BMP  (still pending)  4. See me ~6 weeks with amiodarone labs and echo    Assessment/Plan:    1. Atrial Fibrillation    Now s/p DCCV 8/22/2022    EKG with SB - this has been an issue on amiodarone in the past. He's on no other AVN blocking agents    PLAN:    Continue amiodarone for now    Amio labs 6 weeks    See me ~6 weeks with amiodarone labs and updated echo    Abstain from EtOH    2. HTN    Remains on Lasix 20 mg PRN    Has hydrochlorothiazide daily.     Now with more swelling    PLAN:    Reviewed BMP after clinic - [employer] message sent    Stop hydrochlorothiazide 25 mg daily    Lasix 20 mg daily    KCl 20 mEq BID (refill sent)         Crystal Campos PA-C, MSPAS      Orders Placed This Encounter   Procedures     Basic metabolic panel     Hepatic panel     TSH with free T4 reflex     Follow-Up with Cardiology [facility]     EKG 12-lead complete w/read - Clinics (performed today)     EKG 12-lead complete w/read - Clinics (to be scheduled)     Echocardiogram Complete     Orders Placed This Encounter   Medications     rivaroxaban ANTICOAGULANT (XARELTO ANTICOAGULANT) 20 MG TABS tablet     Sig: Take 1 tablet (20 mg) by mouth daily (with dinner)     Dispense:  90 tablet      Refill:  3     Keep on file until pt is due     furosemide (LASIX) 20 MG tablet     Sig: Take 1 tablet (20 mg) by mouth daily     amiodarone (PACERONE) 200 MG tablet     Sig: Take 1 tablet (200 mg) by mouth daily     Medications Discontinued During This Encounter   Medication Reason     valACYclovir (VALTREX) 1000 mg tablet Therapy completed     hydrochlorothiazide (HYDRODIURIL) 25 MG tablet      rivaroxaban ANTICOAGULANT (XARELTO ANTICOAGULANT) 20 MG TABS tablet Reorder     amiodarone (PACERONE) 200 MG tablet      furosemide (LASIX) 20 MG tablet Reorder         Encounter Diagnoses   Name Primary?     Persistent atrial fibrillation (H) Yes     On amiodarone therapy        CURRENT MEDICATIONS:  Current Outpatient Medications   Medication Sig Dispense Refill     allopurinol (ZYLOPRIM) 300 MG tablet Take 1 tablet (300 mg) by mouth daily 90 tablet 0     amiodarone (PACERONE) 200 MG tablet Take 1 tablet (200 mg) by mouth daily       cetirizine (ZYRTEC) 10 MG tablet Take 10 mg by mouth as needed for allergies       finasteride (PROPECIA) 1 MG tablet TAKE ONE TABLET BY MOUTH ONE TIME DAILY 90 tablet 0     furosemide (LASIX) 20 MG tablet Take 1 tablet (20 mg) by mouth daily       losartan (COZAAR) 100 MG tablet Take 1 tablet (100 mg) by mouth daily. 90 tablet 2     potassium chloride ER (KLOR-CON M) 20 MEQ CR tablet Take 1 tablet (20 mEq) by mouth 2 times daily 30 tablet 0     rivaroxaban ANTICOAGULANT (XARELTO ANTICOAGULANT) 20 MG TABS tablet Take 1 tablet (20 mg) by mouth daily (with dinner) 90 tablet 3       ALLERGIES     Allergies   Allergen Reactions     No Known Drug Allergies          Review of Systems:  Skin:  Negative     Eyes:  Negative    ENT:  Negative    Respiratory:  Negative for sleep apnea;dyspnea on exertion  Cardiovascular:  Negative for;palpitations;chest pain;exercise intolerance edema;Positive for  Gastroenterology: Negative for melena;hematochezia  Genitourinary:  Positive for  "nocturia  Musculoskeletal:  Negative for muscular weakness  Neurologic:  Positive for    Psychiatric:  Negative    Heme/Lymph/Imm:  Negative    Endocrine:  Negative      Physical Exam:  Vitals: /79 (BP Location: Left arm, Cuff Size: Adult Large)   Pulse 74   Ht 1.727 m (5' 8\")   Wt 125.2 kg (276 lb)   BMI 41.97 kg/m      Constitutional:  cooperative, alert and oriented, well developed, well nourished, in no acute distress        Skin:  warm and dry to the touch, no apparent skin lesions or masses noted        Head:  normocephalic, no masses or lesions        Eyes:  pupils equal and round;conjunctivae and lids unremarkable   R facial weakness much improved 9/2022    ENT:           Neck:  JVP normal        Chest:  normal breath sounds, clear to auscultation, normal A-P diameter, normal symmetry, normal respiratory excursion, no use of accessory muscles        Cardiac: regular rhythm bradycardic   no presence of murmur            Abdomen:  abdomen soft obese      Vascular: pulses full and equal                                      Extremities and Back:  no deformities, clubbing, cyanosis, erythema observed;normal muscle strength and tone        Neurological:  affect appropriate   Right sided facial drooping present, no I have to check when I need [place]ya for her trip - inproved        PAST MEDICAL HISTORY:  Past Medical History:   Diagnosis Date     H/O ETOH abuse      Herpes simplex without mention of complication      Hyperlipidemia      Obesity      Persistent atrial fibrillation (H)     persistent, DCCV 12/2017, ablation 11/6/17     Tobacco use disorder     dced 6/2004       PAST SURGICAL HISTORY:  Past Surgical History:   Procedure Laterality Date     ABLATION OF DYSRHYTHMIC FOCUS  2018, 04/12/2019    Procedure: EP Ablation Focal AFIB;  Surgeon: Fady Day MD;  Location:  HEART CARDIAC CATH LAB     ACHILLES TENDON SURGERY  1997     ANESTHESIA CARDIOVERSION N/A 2/26/2019    Procedure: ANESTHESIA " CARDIOVERSION (CONNER);  Surgeon: GENERIC ANESTHESIA PROVIDER;  Location:  OR     ANESTHESIA CARDIOVERSION N/A 2020    Procedure: ANESTHESIA, FOR CARDIOVERSION (dr campbell);  Surgeon: GENERIC ANESTHESIA PROVIDER;  Location:  OR     ANESTHESIA CARDIOVERSION N/A 2022    Procedure: CARDIOVERSION;  Surgeon: GENERIC ANESTHESIA PROVIDER;  Location:  OR     BASAL CELL CARCINOMA EXCISION Right 2009    nose, took cartilage from inner ear.     CARDIOVERSION  2019    cardioversion for atr fib     COLONOSCOPY       EP ABLATION FOCAL AFIB N/A 2019    Procedure: EP Ablation Focal AFIB;  Surgeon: Fady Day MD;  Location:  HEART CARDIAC CATH LAB     HERNIA REPAIR, UMBILICAL  2012    Procedure: HERNIORRHAPHY UMBILICAL;  UMBILICAL HERNIA REPAIR;  Surgeon: Ra Crocker MD;  Location: [facility]     HERNIORRHAPHY UMBILICAL  2012    Procedure: HERNIORRHAPHY UMBILICAL;  UMBILICAL HERNIA REPAIR;  Surgeon: Ra Crocker MD;  Location: [facility]     ORTHOPEDIC SURGERY       ORTHOPEDIC SURGERY       OTHER SURGICAL HISTORY Right 2017    total KNEE ARTHROSCOPY      TOTAL KNEE ARTHROPLASTY Left 2018     ZZC NONSPECIFIC PROCEDURE  1997    achilles tendon     ZZC NONSPECIFIC PROCEDURE      knees, arthroscopy     ZZC NONSPECIFIC PROCEDURE      basal cell skin ca, nose     ZZC NONSPECIFIC PROCEDURE      flex sig; colonoscopy due 3/2008     ZZC NONSPECIFIC PROCEDURE      cardioversion for atr fib     ZZC TOTAL KNEE ARTHROPLASTY Left 2018    Dr. Malick Suarez       FAMILY HISTORY:  Family History   Problem Relation Age of Onset     Family History Negative Mother      Heart Disease Father         decesased at 42 - MI     Heart Disease Sister          MI     Heart Disease Brother          MI     Heart Disease Brother         CABG AT 49     Lipids Sister      Lipids Sister      Lipids Sister      Family History Negative Brother        SOCIAL HISTORY:  Social History      Socioeconomic History     Marital status: Single     Spouse name: None     Number of children: None     Years of education: None     Highest education level: None   Tobacco Use     Smoking status: Former Smoker     Packs/day: 1.00     Years: 10.00     Pack years: 10.00     Types: Cigarettes     Start date:      Quit date: 2011     Years since quittin.0     Smokeless tobacco: Never Used   Substance and Sexual Activity     Alcohol use: No     Drug use: No     Sexual activity: Yes     Partners: Female              Thank you for allowing me to participate in the care of your patient.      Sincerely,     Natalie Campos PA-C     [facility]  cc:   Referred Self,

## 2022-09-09 DIAGNOSIS — E87.6 HYPOKALEMIA: ICD-10-CM

## 2022-09-09 DIAGNOSIS — I10 BENIGN ESSENTIAL HYPERTENSION: ICD-10-CM

## 2022-09-09 RX ORDER — POTASSIUM CHLORIDE 1500 MG/1
20 TABLET, EXTENDED RELEASE ORAL 2 TIMES DAILY
Qty: 180 TABLET | Refills: 0 | Status: SHIPPED | OUTPATIENT
Start: 2022-09-09 | End: 2022-12-13

## 2022-09-09 NOTE — TELEPHONE ENCOUNTER
"[facility] Cardiology Refill Guideline reviewed.  Medication meets criteria for refill. Rx was not sent at that time, last filled 8/22/22. 90-day supply sent.     BMP result note 9/7/22-   \"Brayden -I finally got your labs!  Your kidney function and your electrolytes look good.  The potassium is actually 3.6 (it was 3.5 after they gave you all the potassium before the cardioversion)     1.  Stop the hydrochlorothiazide 25 as we discussed  2.  Start furosemide 20 mg daily as we discussed  3.  Continue potassium chloride 20 mEq twice a day.  I have sent a new prescription in for you  4.  See you 10/20 and we will review everything again at that time     Call if any issues/concerns!  [phone]     Crystal    "

## 2022-09-14 DIAGNOSIS — I48.19 PERSISTENT ATRIAL FIBRILLATION (H): ICD-10-CM

## 2022-09-14 RX ORDER — AMIODARONE HYDROCHLORIDE 200 MG/1
200 TABLET ORAL DAILY
Qty: 90 TABLET | Refills: 0 | Status: SHIPPED | OUTPATIENT
Start: 2022-09-14 | End: 2022-10-20

## 2022-10-16 DIAGNOSIS — L65.9 ALOPECIA: ICD-10-CM

## 2022-10-16 DIAGNOSIS — I10 ESSENTIAL HYPERTENSION: ICD-10-CM

## 2022-10-18 ENCOUNTER — HOSPITAL ENCOUNTER (OUTPATIENT)
Dept: CARDIOLOGY | Facility: CLINIC | Age: 72
Discharge: HOME | End: 2022-10-18
Attending: PHYSICIAN ASSISTANT | Admitting: PHYSICIAN ASSISTANT
Payer: COMMERCIAL

## 2022-10-18 ENCOUNTER — LAB (OUTPATIENT)
Dept: LAB | Facility: CLINIC | Age: 72
End: 2022-10-18
Payer: COMMERCIAL

## 2022-10-18 DIAGNOSIS — I48.19 PERSISTENT ATRIAL FIBRILLATION (H): ICD-10-CM

## 2022-10-18 DIAGNOSIS — Z79.899 ON AMIODARONE THERAPY: ICD-10-CM

## 2022-10-18 LAB
ALBUMIN SERPL-MCNC: 3.4 G/DL (ref 3.4–5)
ALP SERPL-CCNC: 75 U/L (ref 40–150)
ALT SERPL W P-5'-P-CCNC: 38 U/L (ref 0–70)
ANION GAP SERPL CALCULATED.3IONS-SCNC: 4 MMOL/L (ref 3–14)
AST SERPL W P-5'-P-CCNC: 25 U/L (ref 0–45)
BILIRUB DIRECT SERPL-MCNC: <0.1 MG/DL (ref 0–0.2)
BILIRUB SERPL-MCNC: 0.6 MG/DL (ref 0.2–1.3)
BUN SERPL-MCNC: 22 MG/DL (ref 7–30)
CALCIUM SERPL-MCNC: 8.8 MG/DL (ref 8.5–10.1)
CHLORIDE BLD-SCNC: 105 MMOL/L (ref 94–109)
CO2 SERPL-SCNC: 30 MMOL/L (ref 20–32)
CREAT SERPL-MCNC: 1.03 MG/DL (ref 0.66–1.25)
GFR SERPL CREATININE-BSD FRML MDRD: 78 ML/MIN/1.73M2
GLUCOSE BLD-MCNC: 100 MG/DL (ref 70–99)
LVEF ECHO: NORMAL
POTASSIUM BLD-SCNC: 3.8 MMOL/L (ref 3.4–5.3)
PROT SERPL-MCNC: 7.4 G/DL (ref 6.8–8.8)
SODIUM SERPL-SCNC: 139 MMOL/L (ref 133–144)
T4 FREE SERPL-MCNC: 1.06 NG/DL (ref 0.76–1.46)
TSH SERPL DL<=0.005 MIU/L-ACNC: 4.33 MU/L (ref 0.4–4)

## 2022-10-18 PROCEDURE — 84439 ASSAY OF FREE THYROXINE: CPT | Performed by: PHYSICIAN ASSISTANT

## 2022-10-18 PROCEDURE — 93306 TTE W/DOPPLER COMPLETE: CPT

## 2022-10-18 PROCEDURE — 80053 COMPREHEN METABOLIC PANEL: CPT | Performed by: PHYSICIAN ASSISTANT

## 2022-10-18 PROCEDURE — 84443 ASSAY THYROID STIM HORMONE: CPT | Performed by: PHYSICIAN ASSISTANT

## 2022-10-18 PROCEDURE — 93306 TTE W/DOPPLER COMPLETE: CPT | Mod: 26 | Performed by: INTERNAL MEDICINE

## 2022-10-18 PROCEDURE — 82248 BILIRUBIN DIRECT: CPT | Performed by: PHYSICIAN ASSISTANT

## 2022-10-18 PROCEDURE — 36415 COLL VENOUS BLD VENIPUNCTURE: CPT | Performed by: PHYSICIAN ASSISTANT

## 2022-10-18 NOTE — PROGRESS NOTES
"[facility]    I had the pleasure of seeing Brayden when he came for follow up of AFib.  This 72 year old sees Dr. Day for his history of:    1. H/o persistent AFib. EF 50-55% 1/2022 - first dx'd in 2012. Failed flecainide (and noted scar on MRI). Underwent PVI and SVC isolation 11/2017. Repeat PVI and CTI for inducible AFlutter 4/2019. Started on Amio with DCCV 11/19/2020 for recurrent AFib in setting of increased alcohol use.   Amiodarone decreased 1/2021; back in persistent AFib 12/2021 and amiodarone stopped as felt asx'c.  This was restarted 8/2022, now s/p DCCV 8/22/2022.  2. Moderate Aortic Stenosis - echo 10/2022 with mean gradient 18 mmHg  3. Benign Essential HTN   4. BOWEN   5. Myocardiac Scar noted on cardiac MRI 10/2017, consistent with prior MI in the left Cx territory. EF 51%.  Avoiding flecainide use  6. H/o Alcohol dependence - no EtOH since 2017, then again 3-4 months in early 2022 after he was noted to be back in AFib. Now no EtOH since 8/2022  7. Chronic AC  On Xarelto for CHADSVASc 2 (HTN, age)  8. Bell's Palsy    I saw Brayden 9/2022 at which time he was feeling much better after DCCV restoring SR.  Lasix started and hydrochlorothiazide stopped for continued DELEON (though improved) and weight gain.    Interval History:  Brayden feels good overall, without recurrent atrial fibrillation!  No c/o CP, SOB. No palpitations, dizziness, lightheadedness. He's remained off EtOH but admits that his diet/exercise pattern is not nearly as good as it was 50 pounds ago.    He does feel better on the furosemide, though notes increased urination.  No orthopnea/PND.    Biggest issue is weight gain, and is unsure if this is the amiodarone, change in TSH for change in diet/exercise.    VITALS:  Vitals: /83 (BP Location: Left arm, Patient Position: Sitting)   Pulse 67   Ht 1.727 m (5' 8\")   Wt 123.9 kg (273 lb 1.6 oz)   SpO2 94%   BMI 41.52 kg/m      Diagnostic Testing:  EKG today, which I " overread, showed SB 59 bpm. QTc is 460 ms on amiodarone 200 mg daily  Echo 10/18/2022 with low-normal LVEF 50-55%. Nl RV. MIld-mod AoS with mean gradient 18 mmHg, Vmax 2.9 m/s, MARILYN 1.3 cm2, DI 0.36. Mildly dilated asc ao 4.3 com  Echo 1/10/96474 showed EF 50-55% LVSF mildly reduced. No RWMA. Nl RV. LA mildly dilated 4.1 cm with volume index 35.7ml/m2. Nl MV. Trace TR. Trace AI. MARILYN 1.4 cm2 with mean gradient 12.7mmHg c/w mild-mod AoS. Asc aorta mildly dilated 4.1 cm  Holter Monitor 12/29/2021 on no AVn blocking agents showed AFib with avg HR 81 bpm (range 44--143 bpm). 2% PVC burden  EKG 7/2021 showed sinus bradycardia 45 bpm.  Nonspecific ST-T wave changes  Echocardiogram 3/2019 showed EF of 55-60%.  No regional wall motion abnormalities were noted.  He had 1+ mitral regurgitation and 1+ tricuspid regurgitation.  RV was normal in structure, function and size.  Aortic valve showed just trace to mild aortic regurgitation with aortic sclerosis.  Mean gradient was 4.6 mmHg (improved from previous) volume index is 46.7 mL/m  with LA dimension of 4.9 cm.  Cardiac MRI 10/2017 showed an EF of 51% with mild hypokinesis involving the basal inferolateral segment.  Evidence of previous myocardial infarction in the circumflex territory was noted.  Component      Latest Ref Rng & Units 9/21/2020 10/18/2022   TSH      0.40 - 4.00 mU/L 0.82 4.33 (H)   T4 Free      0.76 - 1.46 ng/dL  1.06     Component      Latest Ref Rng & Units 6/30/2021 10/18/2022   Bilirubin Direct      0.0 - 0.2 mg/dL 0.2 <0.1   Bilirubin Total      0.2 - 1.3 mg/dL 0.6 0.6   Albumin      3.4 - 5.0 g/dL 3.6 3.4   Protein Total      6.8 - 8.8 g/dL 7.1 7.4   Alkaline Phosphatase      40 - 150 U/L 87    ALT      0 - 70 U/L 42 38   AST      0 - 45 U/L 24 25   Alkaline Phosphatase      40 - 150 U/L  75     Component      Latest Ref Rng & Units 8/5/2022 9/7/2022 10/18/2022   Sodium      133 - 144 mmol/L 140 139 139   Potassium      3.4 - 5.3 mmol/L 3.5 3.6 3.8  "  Chloride      94 - 109 mmol/L 105 105 105   Carbon Dioxide      20 - 32 mmol/L 29 27 30   Anion Gap      3 - 14 mmol/L 6 7 4   Urea Nitrogen      7 - 30 mg/dL 19 23 22   Creatinine      0.66 - 1.25 mg/dL 0.97 1.10 1.03   Calcium      8.5 - 10.1 mg/dL 8.8 8.7 8.8   Glucose      70 - 99 mg/dL 91 87 100 (H)   GFR Estimate      >60 mL/min/1.73m2 84 72 78       Plan:  Decrease amiodarone to 100 mg daily  TSH in 3 months with OV      Assessment/Plan:    1. H/o Persistent AFib    S/p DCCV 8/2022 as he felt he was having more SOB/DELEON. Pretreated with Amiodarone and cardioversion was successful    EKG continues to show SB 59 bpm on amiodarone 200 mg daily.  He is concerned that this has changed his thyroid function and subsequent weight gain, but admits that there are likely multiple reasons for this.    He does remain bradycardic    PLAN:    Okay to decrease amiodarone to 100 mg daily.  We noted that in the past, he had breakthrough atrial fibrillation despite 200 mg of amiodarone daily, so it is likely a matter of time until he returns to AFib.     Reviewed that if he had recurrent atrial fibrillation and did not want to go back up on the higher dose of amiodarone with DCCV, could consider Dr. Day about repeat ablation (his 3rd) versus hospitalization for sotalol therapy, noting that he typically runs a bit bradycardic, so dose may be limited    Continue Xarelto therapy without interruption    2. On Amiodarone therapy    Labs as above showing elevated TSH with nl T4 noted. This has not been an issue in the past     This was restarted 8/2022 as back in AFib. Has had SB in the past on this    PLAN:    Decrease amiodarone to 100 mg daily    Repeat TSH in 3 months with OV with Dr. Day or myself    3. Weight gain    Brayden and I reviewed that a few years ago, he'd quit drinking, was watching his calories with Noom and was exercising 1-2 times a day.  He felt \"great\" with a ton of energy.    He notes that his exercise and " diet have fallen off over the last 6-9 months, and also wondered if amiodarone (8/2022) could be contributing.  I did note TSH was mildly elevated but explained that T4 was still normal, so if anything, he had just subclinical hypothyroidism.    PLAN:    Decrease amiodarone to 100 mg daily    Repeat TSH in 3 months with OV with Dr. Day or myself    Increase exercise/dietary adjustments    Crystal Campos PA-C, MSPAS      Orders Placed This Encounter   Procedures     TSH with free T4 reflex     Follow-Up with Cardiology [facility]     EKG 12-lead complete w/read - Clinics     Orders Placed This Encounter   Medications     amiodarone (PACERONE) 200 MG tablet     Sig: Take 0.5 tablets (100 mg) by mouth daily     Dispense:  45 tablet     Refill:  3     Note dose change. Pt will contact you when needs refilled (using up his current 200 mg tabs)     Medications Discontinued During This Encounter   Medication Reason     amiodarone (PACERONE) 200 MG tablet          Encounter Diagnoses   Name Primary?     Persistent atrial fibrillation (H)      On amiodarone therapy Yes       CURRENT MEDICATIONS:  Current Outpatient Medications   Medication Sig Dispense Refill     allopurinol (ZYLOPRIM) 300 MG tablet Take 1 tablet (300 mg) by mouth daily 90 tablet 0     amiodarone (PACERONE) 200 MG tablet Take 0.5 tablets (100 mg) by mouth daily 45 tablet 3     cetirizine (ZYRTEC) 10 MG tablet Take 10 mg by mouth as needed for allergies       finasteride (PROPECIA) 1 MG tablet TAKE ONE TABLET BY MOUTH ONE TIME DAILY 90 tablet 0     furosemide (LASIX) 20 MG tablet Take 1 tablet (20 mg) by mouth daily       losartan (COZAAR) 100 MG tablet Take 1 tablet (100 mg) by mouth daily. 90 tablet 0     potassium chloride ER (KLOR-CON M) 20 MEQ CR tablet Take 1 tablet (20 mEq) by mouth 2 times daily 180 tablet 0     rivaroxaban ANTICOAGULANT (XARELTO ANTICOAGULANT) 20 MG TABS tablet Take 1 tablet (20 mg) by mouth daily (with dinner) 90 tablet 3       ALLERGIES    "  Allergies   Allergen Reactions     No Known Drug Allergies          Review of Systems:  Skin:  Negative     Eyes:  Negative    ENT:  Negative    Respiratory:  Negative for sleep apnea;dyspnea on exertion  Cardiovascular:  Negative for;palpitations;chest pain;exercise intolerance edema;Positive for  Gastroenterology: Negative for melena;hematochezia  Genitourinary:  Positive for nocturia  Musculoskeletal:  Negative for muscular weakness  Neurologic:  Positive for    Psychiatric:  Negative    Heme/Lymph/Imm:  Negative    Endocrine:  Negative      Physical Exam:  Vitals: /83 (BP Location: Left arm, Patient Position: Sitting)   Pulse 67   Ht 1.727 m (5' 8\")   Wt 123.9 kg (273 lb 1.6 oz)   SpO2 94%   BMI 41.52 kg/m      Constitutional:  cooperative, alert and oriented, well developed, well nourished, in no acute distress        Skin:  warm and dry to the touch, no apparent skin lesions or masses noted        Head:  normocephalic, no masses or lesions        Eyes:  pupils equal and round;conjunctivae and lids unremarkable   R facial weakness much improved 9/2022    ENT:           Neck:  JVP normal        Chest:  clear to auscultation;normal symmetry;no tenderness to palpation        Cardiac: regular rhythm bradycardic   no presence of murmur            Abdomen:  abdomen soft obese      Vascular: pulses full and equal                                      Extremities and Back:  no deformities, clubbing, cyanosis, erythema observed;normal muscle strength and tone        Neurological:  affect appropriate   Right sided facial drooping present, no I have to check when I need Dahiya for her trip - inproved        PAST MEDICAL HISTORY:  Past Medical History:   Diagnosis Date     H/O ETOH abuse      Herpes simplex without mention of complication      Hyperlipidemia      Obesity      Persistent atrial fibrillation (H)     persistent, DCCV 12/2017, ablation 11/6/17     Tobacco use disorder     dced 6/2004       PAST " SURGICAL HISTORY:  Past Surgical History:   Procedure Laterality Date     ABLATION OF DYSRHYTHMIC FOCUS  2018, 04/12/2019    Procedure: EP Ablation Focal AFIB;  Surgeon: Fady Day MD;  Location:  [facility]     ACHILLES TENDON SURGERY  1997     ANESTHESIA CARDIOVERSION N/A 2/26/2019    Procedure: ANESTHESIA CARDIOVERSION (CONNER);  Surgeon: GENERIC ANESTHESIA PROVIDER;  Location:  OR     ANESTHESIA CARDIOVERSION N/A 11/19/2020    Procedure: ANESTHESIA, FOR CARDIOVERSION (dr campbell);  Surgeon: GENERIC ANESTHESIA PROVIDER;  Location:  OR     ANESTHESIA CARDIOVERSION N/A 8/22/2022    Procedure: CARDIOVERSION;  Surgeon: GENERIC ANESTHESIA PROVIDER;  Location:  OR     BASAL CELL CARCINOMA EXCISION Right 2009    nose, took cartilage from inner ear.     CARDIOVERSION  2012, 2019    cardioversion for atr fib     COLONOSCOPY  2008     EP ABLATION FOCAL AFIB N/A 4/12/2019    Procedure: EP Ablation Focal AFIB;  Surgeon: Fady Day MD;  Location: [facility] CARDIAC CATH LAB     HERNIA REPAIR, UMBILICAL  08/20/2012    Procedure: HERNIORRHAPHY UMBILICAL;  UMBILICAL HERNIA REPAIR;  Surgeon: Ra Crocker MD;  Location: [facility]     HERNIORRHAPHY UMBILICAL  8/20/2012    Procedure: HERNIORRHAPHY UMBILICAL;  UMBILICAL HERNIA REPAIR;  Surgeon: Ra Crocker MD;  Location: [facility]     ORTHOPEDIC SURGERY       ORTHOPEDIC SURGERY       OTHER SURGICAL HISTORY Right 03/2017    total KNEE ARTHROSCOPY      TOTAL KNEE ARTHROPLASTY Left 03/16/2018     ZZC NONSPECIFIC PROCEDURE  1997    achilles tendon     ZZC NONSPECIFIC PROCEDURE      knees, arthroscopy     ZZC NONSPECIFIC PROCEDURE      basal cell skin ca, nose     ZZC NONSPECIFIC PROCEDURE      flex sig; colonoscopy due 3/2008     ZZC NONSPECIFIC PROCEDURE  2012    cardioversion for atr fib     ZZC TOTAL KNEE ARTHROPLASTY Left 03/16/2018    Dr. Malick Suarez       FAMILY HISTORY:  Family History   Problem Relation Age of Onset     Family History Negative  Mother      Heart Disease Father         decesased at 42 - MI     Heart Disease Sister          MI     Heart Disease Brother          MI     Heart Disease Brother         CABG AT 49     Lipids Sister      Lipids Sister      Lipids Sister      Family History Negative Brother        SOCIAL HISTORY:  Social History     Socioeconomic History     Marital status: Single     Spouse name: None     Number of children: None     Years of education: None     Highest education level: None   Tobacco Use     Smoking status: Former     Packs/day: 1.00     Years: 10.00     Pack years: 10.00     Types: Cigarettes     Start date:      Quit date: 2011     Years since quittin.1     Smokeless tobacco: Never   Substance and Sexual Activity     Alcohol use: No     Drug use: No     Sexual activity: Yes     Partners: Female

## 2022-10-19 RX ORDER — FINASTERIDE 1 MG/1
TABLET, FILM COATED ORAL
Qty: 90 TABLET | Refills: 0 | Status: SHIPPED | OUTPATIENT
Start: 2022-10-19 | End: 2023-01-30

## 2022-10-19 RX ORDER — LOSARTAN POTASSIUM 100 MG/1
100 TABLET ORAL DAILY
Qty: 90 TABLET | Refills: 0 | Status: SHIPPED | OUTPATIENT
Start: 2022-10-19 | End: 2023-01-05

## 2022-10-19 NOTE — TELEPHONE ENCOUNTER
Losartan     Medication is being filled for 1 time refill only due to:  Patient needs to be seen because it has been more than one year since last visit.    Finasteride    Miscellaneous Dermatologic Agents Failed 10/16/2022 05:23 PM   Protocol Details  Refill request is not for Imiquimod, 5-Fluorouracil, or Finasteride

## 2022-10-20 ENCOUNTER — OFFICE VISIT (OUTPATIENT)
Dept: CARDIOLOGY | Facility: CLINIC | Age: 72
End: 2022-10-20
Attending: PHYSICIAN ASSISTANT
Payer: COMMERCIAL

## 2022-10-20 VITALS
BODY MASS INDEX: 41.39 KG/M2 | SYSTOLIC BLOOD PRESSURE: 134 MMHG | HEIGHT: 68 IN | HEART RATE: 67 BPM | OXYGEN SATURATION: 94 % | DIASTOLIC BLOOD PRESSURE: 83 MMHG | WEIGHT: 273.1 LBS

## 2022-10-20 DIAGNOSIS — I48.19 PERSISTENT ATRIAL FIBRILLATION (H): ICD-10-CM

## 2022-10-20 DIAGNOSIS — Z79.899 ON AMIODARONE THERAPY: Primary | ICD-10-CM

## 2022-10-20 PROCEDURE — 99214 OFFICE O/P EST MOD 30 MIN: CPT | Performed by: PHYSICIAN ASSISTANT

## 2022-10-20 PROCEDURE — 93000 ELECTROCARDIOGRAM COMPLETE: CPT | Performed by: PHYSICIAN ASSISTANT

## 2022-10-20 RX ORDER — AMIODARONE HYDROCHLORIDE 200 MG/1
100 TABLET ORAL DAILY
Qty: 45 TABLET | Refills: 3
Start: 2022-10-20 | End: 2023-01-20

## 2022-10-20 NOTE — LETTER
10/20/2022    Loco Cummins MD  [address] [place] [id number]    RE: Mike Schultz       Dear Colleague,     I had the pleasure of seeing Mike Schultz in the [facility].  [facility]    I had the pleasure of seeing Brayden when he came for follow up of AFib.  This 72 year old sees Dr. Day for his history of:    1. H/o persistent AFib. EF 50-55% 1/2022 - first dx'd in 2012. Failed flecainide (and noted scar on MRI). Underwent PVI and SVC isolation 11/2017. Repeat PVI and CTI for inducible AFlutter 4/2019. Started on Amio with DCCV 11/19/2020 for recurrent AFib in setting of increased alcohol use.   Amiodarone decreased 1/2021; back in persistent AFib 12/2021 and amiodarone stopped as felt asx'c.  This was restarted 8/2022, now s/p DCCV 8/22/2022.  2. Moderate Aortic Stenosis - echo 10/2022 with mean gradient 18 mmHg  3. Benign Essential HTN   4. BOWEN   5. Myocardiac Scar noted on cardiac MRI 10/2017, consistent with prior MI in the left Cx territory. EF 51%.  Avoiding flecainide use  6. H/o Alcohol dependence - no EtOH since 2017, then again 3-4 months in early 2022 after he was noted to be back in AFib. Now no EtOH since 8/2022  7. Chronic AC  On Xarelto for CHADSVASc 2 (HTN, age)  8. Bell's Palsy    I saw Brayden 9/2022 at which time he was feeling much better after DCCV restoring SR.  Lasix started and hydrochlorothiazide stopped for continued DELEON (though improved) and weight gain.    Interval History:  Brayden feels good overall, without recurrent atrial fibrillation!  No c/o CP, SOB. No palpitations, dizziness, lightheadedness. He's remained off EtOH but admits that his diet/exercise pattern is not nearly as good as it was 50 pounds ago.    He does feel better on the furosemide, though notes increased urination.  No orthopnea/PND.    Biggest issue is weight gain, and is unsure if this is the amiodarone, change in TSH for change in  "diet/exercise.    VITALS:  Vitals: /83 (BP Location: Left arm, Patient Position: Sitting)   Pulse 67   Ht 1.727 m (5' 8\")   Wt 123.9 kg (273 lb 1.6 oz)   SpO2 94%   BMI 41.52 kg/m      Diagnostic Testing:  EKG today, which I overread, showed SB 59 bpm. QTc is 460 ms on amiodarone 200 mg daily  Echo 10/18/2022 with low-normal LVEF 50-55%. Nl RV. MIld-mod AoS with mean gradient 18 mmHg, Vmax 2.9 m/s, MARILYN 1.3 cm2, DI 0.36. Mildly dilated asc ao 4.3 com  Echo 1/10/76294 showed EF 50-55% LVSF mildly reduced. No RWMA. Nl RV. LA mildly dilated 4.1 cm with volume index 35.7ml/m2. Nl MV. Trace TR. Trace AI. MARILYN 1.4 cm2 with mean gradient 12.7mmHg c/w mild-mod AoS. Asc aorta mildly dilated 4.1 cm  Holter Monitor 12/29/2021 on no AVn blocking agents showed AFib with avg HR 81 bpm (range 44--143 bpm). 2% PVC burden  EKG 7/2021 showed sinus bradycardia 45 bpm.  Nonspecific ST-T wave changes  Echocardiogram 3/2019 showed EF of 55-60%.  No regional wall motion abnormalities were noted.  He had 1+ mitral regurgitation and 1+ tricuspid regurgitation.  RV was normal in structure, function and size.  Aortic valve showed just trace to mild aortic regurgitation with aortic sclerosis.  Mean gradient was 4.6 mmHg (improved from previous) volume index is 46.7 mL/m  with LA dimension of 4.9 cm.  Cardiac MRI 10/2017 showed an EF of 51% with mild hypokinesis involving the basal inferolateral segment.  Evidence of previous myocardial infarction in the circumflex territory was noted.  Component      Latest Ref Rng & Units 9/21/2020 10/18/2022   TSH      0.40 - 4.00 mU/L 0.82 4.33 (H)   T4 Free      0.76 - 1.46 ng/dL  1.06     Component      Latest Ref Rng & Units 6/30/2021 10/18/2022   Bilirubin Direct      0.0 - 0.2 mg/dL 0.2 <0.1   Bilirubin Total      0.2 - 1.3 mg/dL 0.6 0.6   Albumin      3.4 - 5.0 g/dL 3.6 3.4   Protein Total      6.8 - 8.8 g/dL 7.1 7.4   Alkaline Phosphatase      40 - 150 U/L 87    ALT      0 - 70 U/L 42 38 "   AST      0 - 45 U/L 24 25   Alkaline Phosphatase      40 - 150 U/L  75     Component      Latest Ref Rng & Units 8/5/2022 9/7/2022 10/18/2022   Sodium      133 - 144 mmol/L 140 139 139   Potassium      3.4 - 5.3 mmol/L 3.5 3.6 3.8   Chloride      94 - 109 mmol/L 105 105 105   Carbon Dioxide      20 - 32 mmol/L 29 27 30   Anion Gap      3 - 14 mmol/L 6 7 4   Urea Nitrogen      7 - 30 mg/dL 19 23 22   Creatinine      0.66 - 1.25 mg/dL 0.97 1.10 1.03   Calcium      8.5 - 10.1 mg/dL 8.8 8.7 8.8   Glucose      70 - 99 mg/dL 91 87 100 (H)   GFR Estimate      >60 mL/min/1.73m2 84 72 78       Plan:  Decrease amiodarone to 100 mg daily  TSH in 3 months with OV      Assessment/Plan:    1. H/o Persistent AFib    S/p DCCV 8/2022 as he felt he was having more SOB/DELEON. Pretreated with Amiodarone and cardioversion was successful    EKG continues to show SB 59 bpm on amiodarone 200 mg daily.  He is concerned that this has changed his thyroid function and subsequent weight gain, but admits that there are likely multiple reasons for this.    He does remain bradycardic    PLAN:    Okay to decrease amiodarone to 100 mg daily.  We noted that in the past, he had breakthrough atrial fibrillation despite 200 mg of amiodarone daily, so it is likely a matter of time until he returns to AFib.     Reviewed that if he had recurrent atrial fibrillation and did not want to go back up on the higher dose of amiodarone with DCCV, could consider Dr. Day about repeat ablation (his 3rd) versus hospitalization for sotalol therapy, noting that he typically runs a bit bradycardic, so dose may be limited    Continue Xarelto therapy without interruption    2. On Amiodarone therapy    Labs as above showing elevated TSH with nl T4 noted. This has not been an issue in the past     This was restarted 8/2022 as back in AFib. Has had SB in the past on this    PLAN:    Decrease amiodarone to 100 mg daily    Repeat TSH in 3 months with OV with Dr. Day or  "myself    3. Weight gain    Brayden and I reviewed that a few years ago, he'd quit drinking, was watching his calories with Noom and was exercising 1-2 times a day.  He felt \"great\" with a ton of energy.    He notes that his exercise and diet have fallen off over the last 6-9 months, and also wondered if amiodarone (8/2022) could be contributing.  I did note TSH was mildly elevated but explained that T4 was still normal, so if anything, he had just subclinical hypothyroidism.    PLAN:    Decrease amiodarone to 100 mg daily    Repeat TSH in 3 months with OV with Dr. Day or myself    Increase exercise/dietary adjustments    Crystal Campos PA-C, MSPAS      Orders Placed This Encounter   Procedures     TSH with free T4 reflex     Follow-Up with Cardiology [facility]     EKG 12-lead complete w/read - Clinics     Orders Placed This Encounter   Medications     amiodarone (PACERONE) 200 MG tablet     Sig: Take 0.5 tablets (100 mg) by mouth daily     Dispense:  45 tablet     Refill:  3     Note dose change. Pt will contact you when needs refilled (using up his current 200 mg tabs)     Medications Discontinued During This Encounter   Medication Reason     amiodarone (PACERONE) 200 MG tablet          Encounter Diagnoses   Name Primary?     Persistent atrial fibrillation (H)      On amiodarone therapy Yes       CURRENT MEDICATIONS:  Current Outpatient Medications   Medication Sig Dispense Refill     allopurinol (ZYLOPRIM) 300 MG tablet Take 1 tablet (300 mg) by mouth daily 90 tablet 0     amiodarone (PACERONE) 200 MG tablet Take 0.5 tablets (100 mg) by mouth daily 45 tablet 3     cetirizine (ZYRTEC) 10 MG tablet Take 10 mg by mouth as needed for allergies       finasteride (PROPECIA) 1 MG tablet TAKE ONE TABLET BY MOUTH ONE TIME DAILY 90 tablet 0     furosemide (LASIX) 20 MG tablet Take 1 tablet (20 mg) by mouth daily       losartan (COZAAR) 100 MG tablet Take 1 tablet (100 mg) by mouth daily. 90 tablet 0     potassium chloride ER " "(KLOR-CON M) 20 MEQ CR tablet Take 1 tablet (20 mEq) by mouth 2 times daily 180 tablet 0     rivaroxaban ANTICOAGULANT (XARELTO ANTICOAGULANT) 20 MG TABS tablet Take 1 tablet (20 mg) by mouth daily (with dinner) 90 tablet 3       ALLERGIES     Allergies   Allergen Reactions     No Known Drug Allergies          Review of Systems:  Skin:  Negative     Eyes:  Negative    ENT:  Negative    Respiratory:  Negative for sleep apnea;dyspnea on exertion  Cardiovascular:  Negative for;palpitations;chest pain;exercise intolerance edema;Positive for  Gastroenterology: Negative for melena;hematochezia  Genitourinary:  Positive for nocturia  Musculoskeletal:  Negative for muscular weakness  Neurologic:  Positive for    Psychiatric:  Negative    Heme/Lymph/Imm:  Negative    Endocrine:  Negative      Physical Exam:  Vitals: /83 (BP Location: Left arm, Patient Position: Sitting)   Pulse 67   Ht 1.727 m (5' 8\")   Wt 123.9 kg (273 lb 1.6 oz)   SpO2 94%   BMI 41.52 kg/m      Constitutional:  cooperative, alert and oriented, well developed, well nourished, in no acute distress        Skin:  warm and dry to the touch, no apparent skin lesions or masses noted        Head:  normocephalic, no masses or lesions        Eyes:  pupils equal and round;conjunctivae and lids unremarkable   R facial weakness much improved 9/2022    ENT:           Neck:  JVP normal        Chest:  clear to auscultation;normal symmetry;no tenderness to palpation        Cardiac: regular rhythm bradycardic   no presence of murmur            Abdomen:  abdomen soft obese      Vascular: pulses full and equal                                      Extremities and Back:  no deformities, clubbing, cyanosis, erythema observed;normal muscle strength and tone        Neurological:  affect appropriate   Right sided facial drooping present, no I have to check when I need Dahiya for her trip - inproved       PAST MEDICAL HISTORY:  Past Medical History:   Diagnosis Date     " H/O ETOH abuse      Herpes simplex without mention of complication      Hyperlipidemia      Obesity      Persistent atrial fibrillation (H)     persistent, DCCV 12/2017, ablation 11/6/17     Tobacco use disorder     dced 6/2004       PAST SURGICAL HISTORY:  Past Surgical History:   Procedure Laterality Date     ABLATION OF DYSRHYTHMIC FOCUS  2018, 04/12/2019    Procedure: EP Ablation Focal AFIB;  Surgeon: Fady Day MD;  Location:  HEART CARDIAC CATH LAB     ACHILLES TENDON SURGERY  1997     ANESTHESIA CARDIOVERSION N/A 2/26/2019    Procedure: ANESTHESIA CARDIOVERSION (CONNER);  Surgeon: GENERIC ANESTHESIA PROVIDER;  Location:  OR     ANESTHESIA CARDIOVERSION N/A 11/19/2020    Procedure: ANESTHESIA, FOR CARDIOVERSION (dr campbell);  Surgeon: GENERIC ANESTHESIA PROVIDER;  Location:  OR     ANESTHESIA CARDIOVERSION N/A 8/22/2022    Procedure: CARDIOVERSION;  Surgeon: GENERIC ANESTHESIA PROVIDER;  Location:  OR     BASAL CELL CARCINOMA EXCISION Right 2009    nose, took cartilage from inner ear.     CARDIOVERSION  2012, 2019    cardioversion for atr fib     COLONOSCOPY  2008     EP ABLATION FOCAL AFIB N/A 4/12/2019    Procedure: EP Ablation Focal AFIB;  Surgeon: Fady Day MD;  Location:  HEART CARDIAC CATH LAB     HERNIA REPAIR, UMBILICAL  08/20/2012    Procedure: HERNIORRHAPHY UMBILICAL;  UMBILICAL HERNIA REPAIR;  Surgeon: Ra Crocker MD;  Location: [facility]     HERNIORRHAPHY UMBILICAL  8/20/2012    Procedure: HERNIORRHAPHY UMBILICAL;  UMBILICAL HERNIA REPAIR;  Surgeon: Ra Crocker MD;  Location: [facility]     ORTHOPEDIC SURGERY       ORTHOPEDIC SURGERY       OTHER SURGICAL HISTORY Right 03/2017    total KNEE ARTHROSCOPY      TOTAL KNEE ARTHROPLASTY Left 03/16/2018     ZZC NONSPECIFIC PROCEDURE  1997    achilles tendon     ZZC NONSPECIFIC PROCEDURE      knees, arthroscopy     ZZC NONSPECIFIC PROCEDURE      basal cell skin ca, nose     ZZC NONSPECIFIC PROCEDURE      flex sig; colonoscopy due  3/2008     Z NONSPECIFIC PROCEDURE  2012    cardioversion for atr fib     ZZC TOTAL KNEE ARTHROPLASTY Left 2018    Dr. Malick Suarez       FAMILY HISTORY:  Family History   Problem Relation Age of Onset     Family History Negative Mother      Heart Disease Father         decesased at 42 - MI     Heart Disease Sister          MI     Heart Disease Brother          MI     Heart Disease Brother         CABG AT 49     Lipids Sister      Lipids Sister      Lipids Sister      Family History Negative Brother        SOCIAL HISTORY:  Social History     Socioeconomic History     Marital status: Single     Spouse name: None     Number of children: None     Years of education: None     Highest education level: None   Tobacco Use     Smoking status: Former     Packs/day: 1.00     Years: 10.00     Pack years: 10.00     Types: Cigarettes     Start date:      Quit date: 2011     Years since quittin.1     Smokeless tobacco: Never   Substance and Sexual Activity     Alcohol use: No     Drug use: No     Sexual activity: Yes     Partners: Female          lucy you for allowing me to participate in the care of your patient.      Sincerely,     Natlaie Campos PA-C     [facility]  cc:   Natalie Campos PA-C  [address] [facility] [address] [address]

## 2022-10-20 NOTE — PATIENT INSTRUCTIONS
Brayden - it was good to see you today!    EKG today showed normal rhythm - great news!  Reviewed weight gain  Reviewed blood work showing high TSH (indicating a trend towards hypothyroidism), likely due to amiodarone. All other blood work looked great.     PLAN:  Decrease amiodarone to 100 mg daily (1/2 of your 200 mg tabs). I am hoping this keeps you in normal rhythm but doesn't affect thyroid very much and helps you lose weight  See me or Dr. Day in 3 months with repeat labs but CALL if you think you're back in AFib. [phone]

## 2022-11-29 ENCOUNTER — OFFICE VISIT (OUTPATIENT)
Dept: FAMILY MEDICINE | Facility: CLINIC | Age: 72
End: 2022-11-29
Payer: COMMERCIAL

## 2022-11-29 VITALS
SYSTOLIC BLOOD PRESSURE: 140 MMHG | DIASTOLIC BLOOD PRESSURE: 85 MMHG | BODY MASS INDEX: 41.83 KG/M2 | HEART RATE: 56 BPM | HEIGHT: 68 IN | OXYGEN SATURATION: 98 % | RESPIRATION RATE: 13 BRPM | WEIGHT: 276 LBS | TEMPERATURE: 97.9 F

## 2022-11-29 DIAGNOSIS — I77.810 ASCENDING AORTA DILATATION (H): ICD-10-CM

## 2022-11-29 DIAGNOSIS — Z12.11 SCREEN FOR COLON CANCER: Primary | ICD-10-CM

## 2022-11-29 DIAGNOSIS — H93.8X1 SENSATION OF PLUGGED EAR ON RIGHT SIDE: ICD-10-CM

## 2022-11-29 DIAGNOSIS — M10.9 GOUT OF FOOT, UNSPECIFIED CAUSE, UNSPECIFIED CHRONICITY, UNSPECIFIED LATERALITY: ICD-10-CM

## 2022-11-29 PROCEDURE — 99213 OFFICE O/P EST LOW 20 MIN: CPT | Performed by: NURSE PRACTITIONER

## 2022-11-29 RX ORDER — FLUTICASONE PROPIONATE 50 MCG
1 SPRAY, SUSPENSION (ML) NASAL DAILY
Qty: 9.9 ML | Refills: 3 | Status: SHIPPED | OUTPATIENT
Start: 2022-11-29 | End: 2023-01-20

## 2022-11-29 ASSESSMENT — PAIN SCALES - GENERAL: PAINLEVEL: NO PAIN (0)

## 2022-11-29 NOTE — PROGRESS NOTES
"  Assessment & Plan   Problem List Items Addressed This Visit     Ascending aorta dilatation (H)   Other Visit Diagnoses     Screen for colon cancer    -  Primary    Relevant Orders    Colonoscopy Screening  Referral    Sensation of plugged ear on right side        Relevant Medications    fluticasone (FLONASE) 50 MCG/ACT nasal spray           15 minutes spent on the date of the encounter doing chart review, history and exam, documentation and further activities per the note       BMI:   Estimated body mass index is 41.98 kg/m  as calculated from the following:    Height as of this encounter: 1.727 m (5' 7.99\").    Weight as of this encounter: 125.2 kg (276 lb).       See Patient Instructions    No follow-ups on file.    AYAN Suggs CNP  [facility]QUISE [facility] is a 72 year old{ presenting for the following health issues:  OFFICE VISIT (Check right ear plugged)      History of Present Illness       Reason for visit:  Right ear plugged possibly from bell palsy  Symptom onset:  More than a month  Symptoms include:  Ear plugs up  fluttering sound when outside  Symptom intensity:  Moderate  Symptom progression:  Staying the same  Had these symptoms before:  No  What makes it worse:  Talking and listening in a group feels like it plugs up  What makes it better:  Sleeping/ working out on eliptical    He eats 2-3 servings of fruits and vegetables daily.He consumes 0 sweetened beverage(s) daily.He exercises with enough effort to increase his heart rate 30 to 60 minutes per day.  He exercises with enough effort to increase his heart rate 5 days per week.   He is taking medications regularly.           Review of Systems   Constitutional, HEENT, cardiovascular, pulmonary, gi and gu systems are negative, except as otherwise noted.      Objective    There were no vitals taken for this visit.  There is no height or weight on file to calculate BMI.  Physical Exam   GENERAL: " healthy, alert and no distress  HENT: normal cephalic/atraumatic, both ears: bulging membrane and scarring present, nasal mucosa edematous , oropharynx clear and oral mucous membranes moist  NECK: no adenopathy, no asymmetry, masses, or scars and thyroid normal to palpation  RESP: lungs clear to auscultation - no rales, rhonchi or wheezes  CV: regular rate and rhythm, normal S1 S2, no S3 or S4, no murmur, click or rub, no peripheral edema and peripheral pulses strong    Hospital Outpatient Visit on 10/18/2022   Component Date Value Ref Range Status     LVEF  10/18/2022 50-55%   Final

## 2022-12-02 RX ORDER — ALLOPURINOL 300 MG/1
TABLET ORAL
Qty: 90 TABLET | Refills: 0 | Status: SHIPPED | OUTPATIENT
Start: 2022-12-02 | End: 2023-02-20

## 2022-12-02 NOTE — TELEPHONE ENCOUNTER
Routing refill request to provider for review/approval because:  Uric Acid   Date Value Ref Range Status   10/06/2021 5.8 3.5 - 7.2 mg/dL Final   09/21/2020 5.5 3.5 - 7.2 mg/dL Final         Que Fuller RN  [facility] Triage Nurse

## 2022-12-04 ENCOUNTER — HEALTH MAINTENANCE LETTER (OUTPATIENT)
Age: 72
End: 2022-12-04

## 2022-12-12 ENCOUNTER — OFFICE VISIT (OUTPATIENT)
Dept: INTERNAL MEDICINE | Facility: CLINIC | Age: 72
End: 2022-12-12
Payer: COMMERCIAL

## 2022-12-12 VITALS
WEIGHT: 276.7 LBS | SYSTOLIC BLOOD PRESSURE: 136 MMHG | BODY MASS INDEX: 41.94 KG/M2 | DIASTOLIC BLOOD PRESSURE: 78 MMHG | RESPIRATION RATE: 16 BRPM | HEIGHT: 68 IN | HEART RATE: 52 BPM | TEMPERATURE: 97.5 F | OXYGEN SATURATION: 96 %

## 2022-12-12 DIAGNOSIS — R68.89 EAR SYMPTOM: Primary | ICD-10-CM

## 2022-12-12 PROCEDURE — 99213 OFFICE O/P EST LOW 20 MIN: CPT | Performed by: INTERNAL MEDICINE

## 2022-12-12 ASSESSMENT — PAIN SCALES - GENERAL: PAINLEVEL: NO PAIN (0)

## 2022-12-12 NOTE — PROGRESS NOTES
"  Assessment & Plan     Ear symptom  His exam today is perfectly normal, certainly no cerumen impaction whatsoever in either ear.  Advised trial of antihistamine/decongestant combination for a few days (as opposed to just plain antihistamine), he would like to see an alternate ENT provider as he saw it before.  Referral written.  - Adult ENT  Referral; Future             BMI:   Estimated body mass index is 42.07 kg/m  as calculated from the following:    Height as of this encounter: 1.727 m (5' 8\").    Weight as of this encounter: 125.5 kg (276 lb 11.2 oz).           Return in about 6 months (around 6/12/2023) for Annual Wellness Visit.    Loco Cummins MD  [facility]    Jerri Owen is a 72 year old, presenting for the following health issues:  Ear Problem (Right ear problem)      History of Present Illness       Reason for visit:  Blockage of right ear  Symptom onset:  More than a month  Symptoms include:  Spongee sound when out side blocks from time to time like it plugs when you are in an airplane  Symptom intensity:  Severe  Symptom progression:  Staying the same  Had these symptoms before:  No  What makes it worse:  No  What makes it better:  Lying down sleeping  working out    He eats 2-3 servings of fruits and vegetables daily.He consumes 0 sweetened beverage(s) daily.He exercises with enough effort to increase his heart rate 20 to 29 minutes per day.  He exercises with enough effort to increase his heart rate 5 days per week.   He is taking medications regularly.       Continues to have an intermittent sensation of \"sponginess\" and \"fullness\" in his right ear.  Does interfere with hearing.  Did see an ear nose and throat provider who had suggested Flonase, which has not been helpful.      Review of Systems   Constitutional, HEENT, cardiovascular, pulmonary, gi and gu systems are negative, except as otherwise noted.      Objective    /78   Pulse 52 " "  Temp 97.5  F (36.4  C) (Oral)   Resp 16   Ht 1.727 m (5' 8\")   Wt 125.5 kg (276 lb 11.2 oz)   SpO2 96%   BMI 42.07 kg/m    Body mass index is 42.07 kg/m .  Physical Exam   GENERAL: healthy, alert and no distress  NECK: no adenopathy, no asymmetry, masses, or scars and thyroid normal to palpation  RESP: lungs clear to auscultation - no rales, rhonchi or wheezes  CV: regular rate and rhythm, normal S1 S2, no S3 or S4, no murmur, click or rub, no peripheral edema and peripheral pulses strong  ABDOMEN: soft, nontender, no hepatosplenomegaly, no masses and bowel sounds normal  MS: no gross musculoskeletal defects noted, no edema                    "

## 2022-12-13 DIAGNOSIS — E87.6 HYPOKALEMIA: ICD-10-CM

## 2022-12-13 DIAGNOSIS — I10 BENIGN ESSENTIAL HYPERTENSION: ICD-10-CM

## 2022-12-13 RX ORDER — POTASSIUM CHLORIDE 1500 MG/1
20 TABLET, EXTENDED RELEASE ORAL 2 TIMES DAILY
Qty: 180 TABLET | Refills: 0 | Status: SHIPPED | OUTPATIENT
Start: 2022-12-13 | End: 2023-01-31

## 2022-12-17 ENCOUNTER — OFFICE VISIT (OUTPATIENT)
Dept: URGENT CARE | Facility: URGENT CARE | Age: 72
End: 2022-12-17
Payer: COMMERCIAL

## 2022-12-17 ENCOUNTER — HOSPITAL ENCOUNTER (OUTPATIENT)
Dept: CT IMAGING | Facility: CLINIC | Age: 72
Discharge: HOME | End: 2022-12-17
Attending: PHYSICIAN ASSISTANT | Admitting: PHYSICIAN ASSISTANT
Payer: COMMERCIAL

## 2022-12-17 VITALS
RESPIRATION RATE: 18 BRPM | OXYGEN SATURATION: 98 % | WEIGHT: 277.2 LBS | DIASTOLIC BLOOD PRESSURE: 102 MMHG | TEMPERATURE: 97.4 F | SYSTOLIC BLOOD PRESSURE: 166 MMHG | BODY MASS INDEX: 42.15 KG/M2 | HEART RATE: 57 BPM

## 2022-12-17 DIAGNOSIS — R07.89 CHEST WALL PAIN: ICD-10-CM

## 2022-12-17 DIAGNOSIS — R07.89 ATYPICAL CHEST PAIN: ICD-10-CM

## 2022-12-17 DIAGNOSIS — R07.89 ATYPICAL CHEST PAIN: Primary | ICD-10-CM

## 2022-12-17 LAB
ALBUMIN SERPL-MCNC: 3.5 G/DL (ref 3.4–5)
ALP SERPL-CCNC: 69 U/L (ref 40–150)
ALT SERPL W P-5'-P-CCNC: 32 U/L
ANION GAP SERPL CALCULATED.3IONS-SCNC: 4 MMOL/L (ref 3–14)
AST SERPL W P-5'-P-CCNC: 34 U/L (ref 0–45)
BASOPHILS # BLD AUTO: 0 10E3/UL (ref 0–0.2)
BASOPHILS NFR BLD AUTO: 0 %
BILIRUB SERPL-MCNC: 0.7 MG/DL (ref 0.2–1.3)
BUN SERPL-MCNC: 15 MG/DL (ref 7–30)
CALCIUM SERPL-MCNC: 8.8 MG/DL (ref 8.5–10.1)
CHLORIDE BLD-SCNC: 106 MMOL/L (ref 94–109)
CO2 SERPL-SCNC: 30 MMOL/L (ref 20–32)
CREAT SERPL-MCNC: 1.1 MG/DL (ref 0.66–1.25)
EOSINOPHIL # BLD AUTO: 0.2 10E3/UL (ref 0–0.7)
EOSINOPHIL NFR BLD AUTO: 2 %
ERYTHROCYTE [DISTWIDTH] IN BLOOD BY AUTOMATED COUNT: 13 % (ref 10–15)
ERYTHROCYTE [SEDIMENTATION RATE] IN BLOOD BY WESTERGREN METHOD: 10 MM/HR (ref 0–20)
GFR SERPL CREATININE-BSD FRML MDRD: 72 ML/MIN/1.73M2
GLUCOSE BLD-MCNC: 105 MG/DL (ref 70–99)
HCT VFR BLD AUTO: 46.7 % (ref 40–53)
HGB BLD-MCNC: 15.5 G/DL (ref 13.3–17.7)
LYMPHOCYTES # BLD AUTO: 1.8 10E3/UL (ref 0.8–5.3)
LYMPHOCYTES NFR BLD AUTO: 24 %
MCH RBC QN AUTO: 33.3 PG (ref 26.5–33)
MCHC RBC AUTO-ENTMCNC: 33.2 G/DL (ref 31.5–36.5)
MCV RBC AUTO: 100 FL (ref 78–100)
MONOCYTES # BLD AUTO: 0.7 10E3/UL (ref 0–1.3)
MONOCYTES NFR BLD AUTO: 10 %
NEUTROPHILS # BLD AUTO: 4.8 10E3/UL (ref 1.6–8.3)
NEUTROPHILS NFR BLD AUTO: 64 %
PLATELET # BLD AUTO: 210 10E3/UL (ref 150–450)
POTASSIUM BLD-SCNC: 4.6 MMOL/L (ref 3.4–5.3)
PROT SERPL-MCNC: 6.9 G/DL (ref 6.8–8.8)
RBC # BLD AUTO: 4.65 10E6/UL (ref 4.4–5.9)
SODIUM SERPL-SCNC: 140 MMOL/L (ref 133–144)
TROPONIN I SERPL HS-MCNC: 58 NG/L
WBC # BLD AUTO: 7.5 10E3/UL (ref 4–11)

## 2022-12-17 PROCEDURE — 93000 ELECTROCARDIOGRAM COMPLETE: CPT | Performed by: PHYSICIAN ASSISTANT

## 2022-12-17 PROCEDURE — 80053 COMPREHEN METABOLIC PANEL: CPT | Performed by: PHYSICIAN ASSISTANT

## 2022-12-17 PROCEDURE — 250N000011 HC RX IP 250 OP 636: Performed by: PHYSICIAN ASSISTANT

## 2022-12-17 PROCEDURE — 84484 ASSAY OF TROPONIN QUANT: CPT | Performed by: PHYSICIAN ASSISTANT

## 2022-12-17 PROCEDURE — 36415 COLL VENOUS BLD VENIPUNCTURE: CPT | Performed by: PHYSICIAN ASSISTANT

## 2022-12-17 PROCEDURE — 99214 OFFICE O/P EST MOD 30 MIN: CPT | Performed by: PHYSICIAN ASSISTANT

## 2022-12-17 PROCEDURE — 85025 COMPLETE CBC W/AUTO DIFF WBC: CPT | Performed by: PHYSICIAN ASSISTANT

## 2022-12-17 PROCEDURE — 85652 RBC SED RATE AUTOMATED: CPT | Performed by: PHYSICIAN ASSISTANT

## 2022-12-17 PROCEDURE — 250N000009 HC RX 250: Performed by: PHYSICIAN ASSISTANT

## 2022-12-17 PROCEDURE — 71275 CT ANGIOGRAPHY CHEST: CPT

## 2022-12-17 RX ORDER — PREDNISONE 20 MG/1
20 TABLET ORAL 2 TIMES DAILY
Qty: 6 TABLET | Refills: 0 | Status: SHIPPED | OUTPATIENT
Start: 2022-12-17 | End: 2022-12-20

## 2022-12-17 RX ORDER — IOPAMIDOL 755 MG/ML
83 INJECTION, SOLUTION INTRAVASCULAR ONCE
Status: COMPLETED | OUTPATIENT
Start: 2022-12-17 | End: 2022-12-17

## 2022-12-17 RX ORDER — METHOCARBAMOL 750 MG/1
750 TABLET, FILM COATED ORAL 4 TIMES DAILY PRN
Qty: 30 TABLET | Refills: 0 | Status: SHIPPED | OUTPATIENT
Start: 2022-12-17 | End: 2023-07-27

## 2022-12-17 RX ADMIN — SODIUM CHLORIDE 100 ML: 900 INJECTION INTRAVENOUS at 13:53

## 2022-12-17 RX ADMIN — IOPAMIDOL 83 ML: 755 INJECTION, SOLUTION INTRAVENOUS at 13:52

## 2022-12-17 NOTE — PROGRESS NOTES
Assessment & Plan     Atypical chest pain    EKG and ESR neg for signs of pericarditis  EKG and troponin neg for cardiac event  CBC neg for anemia or elevated WBC  CMP neg for renal, hepatic involvement    CT Scan of chest is NEG for PE, pneumothorax, mass or pericarditis symptoms    This pain is sharp and localized and seems to occur when leaning over or bending, but the pain can manifest for several hours after.      - EKG 12-lead complete w/read - Clinics  - ESR: Erythrocyte sedimentation rate; Future  - Troponin I; Future  - CBC with platelets and differential; Future  - Comprehensive metabolic panel (BMP + Alb, Alk Phos, ALT, AST, Total. Bili, TP); Future  - CT Chest Pulmonary Embolism w Contrast; Future  - ESR: Erythrocyte sedimentation rate  - Troponin I  - CBC with platelets and differential  - Comprehensive metabolic panel (BMP + Alb, Alk Phos, ALT, AST, Total. Bili, TP)    Chest wall pain    The chest pain that you have had today is caused by costochondritis. This condition is caused by an inflammation of the cartilage joining your ribs to your breastbone. It's not caused by heart or lung problems. Your healthcare team has made sure that the chest pain you feel is not from a life threatening cause of chest pain such as heart attack, collapsed lung, blood clot in the lung, tear in the aorta, or esophageal rupture. The inflammation may have been brought on by a blow to the chest, lifting heavy objects, intense exercise, or an illness that made you cough and sneeze a lot. It often occurs during times of emotional stress. It can be painful, but it's not dangerous. It usually goes away in 1 to 2 weeks. But it may happen again.    Trial course of robaxin and prednisone  Ice packs  Follow up with PCP for recheck on this  - methocarbamol (ROBAXIN) 750 MG tablet; Take 1 tablet (750 mg) by mouth 4 times daily as needed  - predniSONE (DELTASONE) 20 MG tablet; Take 1 tablet (20 mg) by mouth 2 times daily for 3  "days    Review of external notes as documented elsewhere in note    At today's visit with Mike Schultz , we discussed results, diagnosis, medications and formulated a plan.  We also discussed red flags for immediate return to clinic/ER, as well as indications for follow up with PCP if not improved in 3 days. Patient understood and agreed to plan. Mike Schultz was discharged with stable vitals and has no further questions.        BMI:   Estimated body mass index is 42.15 kg/m  as calculated from the following:    Height as of 12/12/22: 1.727 m (5' 8\").    Weight as of this encounter: 125.7 kg (277 lb 3.2 oz).           No follow-ups on file.    Tom Mckeon, [facility], PA-C  [facility] is a 72 year old, presenting for the following health issues:  Chest Pain (Pulled muscle in the left chest region possibly from working out 3x days ago.)      HPI   Review of Systems   Constitutional, HEENT, cardiovascular, pulmonary, GI, , musculoskeletal, neuro, skin, endocrine and psych systems are negative, except as otherwise noted.      Objective    BP (!) 166/102 (BP Location: Left arm, Patient Position: Sitting, Cuff Size: Adult Large)   Pulse 57   Temp 97.4  F (36.3  C) (Oral)   Resp 18   Wt 125.7 kg (277 lb 3.2 oz)   SpO2 98%   BMI 42.15 kg/m    Body mass index is 42.15 kg/m .  Physical Exam   GENERAL: healthy, alert and no distress  EYES: Eyes grossly normal to inspection, PERRL and conjunctivae and sclerae normal  HENT: ear canals and TM's normal, nose and mouth without ulcers or lesions  NECK: no adenopathy, no asymmetry, masses, or scars and thyroid normal to palpation  RESP: lungs clear to auscultation - no rales, rhonchi or wheezes  CV: regular rate and rhythm, normal S1 S2, no S3 or S4, no murmur, click or rub, no peripheral edema and peripheral pulses strong  ABDOMEN: soft, nontender, no hepatosplenomegaly, no masses and bowel sounds normal  MS: Positive for " left lower anterior chest tenderness under breast,  Provoked with some movements but not with breathing so much  SKIN: no suspicious lesions or rashes  NEURO: Normal strength and tone, mentation intact and speech normal  PSYCH: mentation appears normal, affect normal/bright        Results for orders placed or performed during the hospital encounter of 12/17/22   CT Chest Pulmonary Embolism w Contrast     Status: None    Narrative    EXAM: CT CHEST PULMONARY EMBOLISM W CONTRAST  LOCATION: [facility]  DATE/TIME: 12/17/2022 2:05 PM    INDICATION: Chest pain. Male sex; No imaging to r o PE in the last 24 hours; Pulmonary Embolism Rule Out Criteria (PERC) score > 0; Revised [place] Score (RGS) not >= 11; No D dimer result available; D dimer not ordered  COMPARISON: None.  TECHNIQUE: CT chest pulmonary angiogram during arterial phase injection of IV contrast. Multiplanar reformats and MIP reconstructions were performed. Dose reduction techniques were used.   CONTRAST: 83 mL Isovue 370    FINDINGS:  ANGIOGRAM CHEST: Pulmonary arteries are normal caliber and negative for pulmonary emboli. Thoracic aorta is not well opacified and is  indeterminate for dissection. No CT evidence of right heart strain.    LUNGS AND PLEURA: No acute airspace opacity. 3 mm subpleural posterior left lower lobe nodule (series 7 image 251). Patent central airways. No pleural effusion.    MEDIASTINUM/AXILLAE: Normal.    CORONARY ARTERY CALCIFICATION: Mild.    UPPER ABDOMEN: Normal.    MUSCULOSKELETAL: Advanced hypertrophic changes in the thoracic spine.      Impression    IMPRESSION:  1.  Negative for pulmonary embolism.  2.  3 mm left lower lobe pulmonary nodule.    REFERENCE:  Guidelines for Management of Incidental Pulmonary Nodules Detected on CT Images: From the Fleischner Society 2017.   Guidelines apply to incidental nodules in patients who are 35 years or older.  Guidelines do not apply to lung cancer screening,  patients with immunosuppression, or patients with known primary cancer.    SINGLE NODULE  Nodule size <6 mm  Low-risk patients: No follow-up needed.  High-risk patients: Optional follow-up at 12 months.    Consider referral to lung nodule clinic.       Results for orders placed or performed in visit on 12/17/22   ESR: Erythrocyte sedimentation rate     Status: Normal   Result Value Ref Range    Erythrocyte Sedimentation Rate 10 0 - 20 mm/hr   Troponin I     Status: Normal   Result Value Ref Range    Troponin I High Sensitivity 58 <79 ng/L   Comprehensive metabolic panel (BMP + Alb, Alk Phos, ALT, AST, Total. Bili, TP)     Status: Abnormal   Result Value Ref Range    Sodium 140 133 - 144 mmol/L    Potassium 4.6 3.4 - 5.3 mmol/L    Chloride 106 94 - 109 mmol/L    Carbon Dioxide (CO2) 30 20 - 32 mmol/L    Anion Gap 4 3 - 14 mmol/L    Urea Nitrogen 15 7 - 30 mg/dL    Creatinine 1.10 0.66 - 1.25 mg/dL    Calcium 8.8 8.5 - 10.1 mg/dL    Glucose 105 (H) 70 - 99 mg/dL    Alkaline Phosphatase 69 40 - 150 U/L    AST 34 0 - 45 U/L    ALT 32 U/L    Protein Total 6.9 6.8 - 8.8 g/dL    Albumin 3.5 3.4 - 5.0 g/dL    Bilirubin Total 0.7 0.2 - 1.3 mg/dL    GFR Estimate 72 >60 mL/min/1.73m2   CBC with platelets and differential     Status: Abnormal   Result Value Ref Range    WBC Count 7.5 4.0 - 11.0 10e3/uL    RBC Count 4.65 4.40 - 5.90 10e6/uL    Hemoglobin 15.5 13.3 - 17.7 g/dL    Hematocrit 46.7 40.0 - 53.0 %     78 - 100 fL    MCH 33.3 (H) 26.5 - 33.0 pg    MCHC 33.2 31.5 - 36.5 g/dL    RDW 13.0 10.0 - 15.0 %    Platelet Count 210 150 - 450 10e3/uL    % Neutrophils 64 %    % Lymphocytes 24 %    % Monocytes 10 %    % Eosinophils 2 %    % Basophils 0 %    Absolute Neutrophils 4.8 1.6 - 8.3 10e3/uL    Absolute Lymphocytes 1.8 0.8 - 5.3 10e3/uL    Absolute Monocytes 0.7 0.0 - 1.3 10e3/uL    Absolute Eosinophils 0.2 0.0 - 0.7 10e3/uL    Absolute Basophils 0.0 0.0 - 0.2 10e3/uL   CBC with platelets and differential     Status:  Abnormal    Narrative    The following orders were created for panel order CBC with platelets and differential.  Procedure                               Abnormality         Status                     ---------                               -----------         ------                     CBC with platelets and d...[[id number]]  Abnormal            Final result                 Please view results for these tests on the individual orders.

## 2023-01-04 DIAGNOSIS — I10 ESSENTIAL HYPERTENSION: ICD-10-CM

## 2023-01-05 RX ORDER — LOSARTAN POTASSIUM 100 MG/1
TABLET ORAL
Qty: 90 TABLET | Refills: 0 | Status: SHIPPED | OUTPATIENT
Start: 2023-01-05 | End: 2023-04-26

## 2023-01-17 ENCOUNTER — LAB (OUTPATIENT)
Dept: LAB | Facility: CLINIC | Age: 73
End: 2023-01-17
Payer: COMMERCIAL

## 2023-01-17 DIAGNOSIS — Z79.899 ON AMIODARONE THERAPY: ICD-10-CM

## 2023-01-17 DIAGNOSIS — I48.19 PERSISTENT ATRIAL FIBRILLATION (H): ICD-10-CM

## 2023-01-17 LAB — TSH SERPL DL<=0.005 MIU/L-ACNC: 2.53 UIU/ML (ref 0.3–4.2)

## 2023-01-17 PROCEDURE — 36415 COLL VENOUS BLD VENIPUNCTURE: CPT

## 2023-01-17 PROCEDURE — 84443 ASSAY THYROID STIM HORMONE: CPT

## 2023-01-18 NOTE — PROGRESS NOTES
[facility]    I had the pleasure of seeing Brayden when he came for follow up of AFib.  This 72 year old sees Dr. Day for his history of:    1. H/o persistent AFib. EF 50-55% 1/2022 - first dx'd in 2012. Failed flecainide (and noted scar on MRI). Underwent PVI and SVC isolation 11/2017. Repeat PVI and CTI for inducible AFlutter 4/2019. Started on Amio with DCCV 11/19/2020 for recurrent AFib in setting of increased alcohol use.   Amiodarone decreased 1/2021; back in persistent AFib 12/2021 and amiodarone stopped as felt asx'c.  This was restarted 8/2022, now s/p DCCV 8/22/2022.  2. Moderate Aortic Stenosis - echo 10/2022 with mean gradient 18 mmHg  3. Benign Essential HTN   4. BOWEN   5. Myocardiac Scar noted on cardiac MRI 10/2017, consistent with prior MI in the left Cx territory. EF 51%.  Avoiding flecainide use  6. H/o Alcohol dependence - no EtOH since 2017, then again 3-4 months in early 2022 after he was noted to be back in AFib. Now no EtOH since 8/2022  7. Chronic AC  On Xarelto for CHADSVASc 2 (HTN, age)  8. Bell's Palsy    I saw Brayden 10/2022 at which time he was feeling quite good without recurrent atrial fibrillation after DCCV 8/2022.  We had stopped HCTZ and started furosemide, and he was having a good diuretic response with this with less DELEON.  He was tolerating amiodarone, though concerned that it was affecting his thyroid (TSH was abnl) and was causing weight gain.  Despite reviewing that he has had recurrent atrial fibrillation despite amiodarone 200 mg daily, we agreed to decrease amiodarone to 100 mg daily, noting if he had recurrence that he was symptomatic about, could increase amiodarone again with DCCV, consider third ablation or consider sotalol load.    He contacted us 1/7 noting low resting HR, which appeared asymptomatic.    Interval History:  Brayden doesn't want to be on Amiodarone any longer. He feels that for the last few weeks he's had some intermittent vision changes  "and still notes HR are 40s while sleeping and only 50s while awake. Occasional dizziness, lightheadedness, but very mild.     Denies any issues with orthopnea or PND but continues to note edema. Lasix helps and \"it works.\"      He had COVID a few months ago and notes he now he now has another respiratory illness and has been taking cough syrup. I think this is why his BP is so high, as it had been under good control prior.    VITALS:  Vitals: BP (!) 180/83   Pulse 57   Ht 1.727 m (5' 8\")   Wt 122.7 kg (270 lb 9.6 oz)   SpO2 95%   BMI 41.14 kg/m      Diagnostic Testing:  EKG today, which I overread, showed SB 55 bpm with 1st degree AVB. QTc ~480 ms on amiodarone 100 mg  Echo 10/18/2022 with low-normal LVEF 50-55%. Nl RV. MIld-mod AoS with mean gradient 18 mmHg, Vmax 2.9 m/s, MARILYN 1.3 cm2, DI 0.36. Mildly dilated asc ao 4.3 com  Echo 1/10/36039 showed EF 50-55% LVSF mildly reduced. No RWMA. Nl RV. LA mildly dilated 4.1 cm with volume index 35.7ml/m2. Nl MV. Trace TR. Trace AI. MARILYN 1.4 cm2 with mean gradient 12.7mmHg c/w mild-mod AoS. Asc aorta mildly dilated 4.1 cm  Holter Monitor 12/29/2021 on no AVn blocking agents showed AFib with avg HR 81 bpm (range 44--143 bpm). 2% PVC burden  EKG 7/2021 showed sinus bradycardia 45 bpm.  Nonspecific ST-T wave changes  Echocardiogram 3/2019 showed EF of 55-60%.  No regional wall motion abnormalities were noted.  He had 1+ mitral regurgitation and 1+ tricuspid regurgitation.  RV was normal in structure, function and size.  Aortic valve showed just trace to mild aortic regurgitation with aortic sclerosis.  Mean gradient was 4.6 mmHg (improved from previous) volume index is 46.7 mL/m  with LA dimension of 4.9 cm.  Cardiac MRI 10/2017 showed an EF of 51% with mild hypokinesis involving the basal   Component      Latest Ref Rng & Units 6/30/2021 10/18/2022 1/17/2023   TSH      0.40 - 4.00 mU/L 2.22 4.33 (H)    T4 Free      0.76 - 1.46 ng/dL  1.06    TSH      0.30 - 4.20 uIU/mL   " 2.53     Component      Latest Ref Rng & Units 12/17/2022   Sodium      133 - 144 mmol/L 140   Potassium      3.4 - 5.3 mmol/L 4.6   Chloride      94 - 109 mmol/L 106   Carbon Dioxide      20 - 32 mmol/L 30   Anion Gap      3 - 14 mmol/L 4   Urea Nitrogen      7 - 30 mg/dL 15   Creatinine      0.66 - 1.25 mg/dL 1.10   Calcium      8.5 - 10.1 mg/dL 8.8   Glucose      70 - 99 mg/dL 105 (H)     Component      Latest Ref Rng & Units 10/18/2022 12/17/2022   Alkaline Phosphatase      40 - 150 U/L 75 69   AST      0 - 45 U/L 25 34   ALT      U/L 38 32   Protein Total      6.8 - 8.8 g/dL 7.4 6.9   Albumin      3.4 - 5.0 g/dL 3.4 3.5   Bilirubin Total      0.2 - 1.3 mg/dL 0.6 0.7   GFR Estimate      >60 mL/min/1.73m2  72   Bilirubin Direct      0.0 - 0.2 mg/dL <0.1      Plan:  Stop cold medication/NSAIDs, etc  See me back 3 months with EKG for BP and rhythm check    Assessment/Plan:    1. H/o persistent AFib    [place] he was having increasing DELEON/SOB from recurrent AFib, and underwent DCCV 8/2022.  Pretreated with amiodarone    Amiodarone decreased to 100 mg daily d/t concerns regarding weight gain and thyroid function    EKG today shows SB 55 bpm and acceptable QTc    PLAN:    Stop amiodarone per his wishes - bradycardia and vision changes    Reviewed that he would likely have recurrent atrial fibrillation and could discuss with Dr. Day a repeat (third) ablation vs consideration of sotalol therapy.    See me back 3 months with EKG    2. On amiodarone therapy    Amiodarone decreased to 100 mg daily 10/2022 d/t abnl TSH    As above, TSH 1/2023 and hepatic panel 12/2022 WNL      PLAN:    Stop amiodarone     3. HTN    BP is way too high. He had a terrible time parking here and was >10 minutes late, which could have driven it up, but then also noted it was high at PCP's office. He's been taking lots of cough syrup/cold medication for respiratory illness/plugged ear    Remains on losartan 100 and Lasix 20    PLAN:    See me back 3  months    Stop cold/cough medication    4. Aortic Stenosis    Mild-moderate on echo 10/2022    PLAN:    Plan echo ~10/2023 (not yet ordered)    Crystal Campos PA-C, MSPAS      Orders Placed This Encounter   Procedures     Follow-Up with Cardiology OTTO     EKG 12-lead, tracing only     No orders of the defined types were placed in this encounter.    Medications Discontinued During This Encounter   Medication Reason     amiodarone (PACERONE) 200 MG tablet      fluticasone (FLONASE) 50 MCG/ACT nasal spray Stopped by Patient (No AVS)         Encounter Diagnosis   Name Primary?     Persistent atrial fibrillation (H)        CURRENT MEDICATIONS:  Current Outpatient Medications   Medication Sig Dispense Refill     allopurinol (ZYLOPRIM) 300 MG tablet Take 1 tablet (300 mg) by mouth daily.  **pt due for appointment--call [phone]** 90 tablet 0     cetirizine (ZYRTEC) 10 MG tablet Take 10 mg by mouth as needed for allergies       finasteride (PROPECIA) 1 MG tablet TAKE ONE TABLET BY MOUTH ONE TIME DAILY 90 tablet 0     furosemide (LASIX) 20 MG tablet Take 1 tablet (20 mg) by mouth daily       losartan (COZAAR) 100 MG tablet Take 1 tablet (100 mg) by mouth daily. Need annual visit 90 tablet 0     methocarbamol (ROBAXIN) 750 MG tablet Take 1 tablet (750 mg) by mouth 4 times daily as needed 30 tablet 0     potassium chloride ER (KLOR-CON M) 20 MEQ CR tablet Take 1 tablet (20 mEq) by mouth 2 times daily 180 tablet 0     rivaroxaban ANTICOAGULANT (XARELTO ANTICOAGULANT) 20 MG TABS tablet Take 1 tablet (20 mg) by mouth daily (with dinner) 90 tablet 3       ALLERGIES     Allergies   Allergen Reactions     No Known Drug Allergies          Review of Systems:  Skin:  Negative     Eyes:  Positive for visual blurring  ENT:  Negative    Respiratory:  Negative for sleep apnea;dyspnea on exertion  Cardiovascular:  Negative for;palpitations;chest pain;exercise intolerance edema;Positive for  Gastroenterology: Negative for  "melena;hematochezia  Genitourinary:  Positive for nocturia  Musculoskeletal:  Negative for muscular weakness  Neurologic:  Positive for    Psychiatric:  Negative    Heme/Lymph/Imm:  Negative    Endocrine:  Negative      Physical Exam:  Vitals: BP (!) 180/83   Pulse 57   Ht 1.727 m (5' 8\")   Wt 122.7 kg (270 lb 9.6 oz)   SpO2 95%   BMI 41.14 kg/m      Constitutional:  cooperative, alert and oriented, well developed, well nourished, in no acute distress        Skin:  warm and dry to the touch, no apparent skin lesions or masses noted        Head:  normocephalic, no masses or lesions        Eyes:  not assessed this visit        ENT:           Neck:  not assessed this visit        Chest:  not assessed this visit        Cardiac: regular rhythm bradycardic       systolic ejection murmur        Abdomen:    obese      Vascular: not assessed this visit                                      Extremities and Back:  no deformities, clubbing, cyanosis, erythema observed        Neurological:  affect appropriate            PAST MEDICAL HISTORY:  Past Medical History:   Diagnosis Date     H/O ETOH abuse      Herpes simplex without mention of complication      Hyperlipidemia      Obesity      Persistent atrial fibrillation (H)     persistent, DCCV 12/2017, ablation 11/6/17     Tobacco use disorder     dced 6/2004       PAST SURGICAL HISTORY:  Past Surgical History:   Procedure Laterality Date     ABLATION OF DYSRHYTHMIC FOCUS  2018, 04/12/2019    Procedure: EP Ablation Focal AFIB;  Surgeon: Fady Day MD;  Location:  HEART CARDIAC CATH LAB     ACHILLES TENDON SURGERY  1997     ANESTHESIA CARDIOVERSION N/A 2/26/2019    Procedure: ANESTHESIA CARDIOVERSION (CONNER);  Surgeon: GENERIC ANESTHESIA PROVIDER;  Location:  OR     ANESTHESIA CARDIOVERSION N/A 11/19/2020    Procedure: ANESTHESIA, FOR CARDIOVERSION (dr campbell);  Surgeon: GENERIC ANESTHESIA PROVIDER;  Location:  OR     ANESTHESIA CARDIOVERSION N/A 8/22/2022    " Procedure: CARDIOVERSION;  Surgeon: GENERIC ANESTHESIA PROVIDER;  Location:  OR     BASAL CELL CARCINOMA EXCISION Right     nose, took cartilage from inner ear.     CARDIOVERSION  2019    cardioversion for atr fib     COLONOSCOPY       EP ABLATION FOCAL AFIB N/A 2019    Procedure: EP Ablation Focal AFIB;  Surgeon: Fady Day MD;  Location:  HEART CARDIAC CATH LAB     HERNIA REPAIR, UMBILICAL  2012    Procedure: HERNIORRHAPHY UMBILICAL;  UMBILICAL HERNIA REPAIR;  Surgeon: Ra Crocker MD;  Location: [facility]     HERNIORRHAPHY UMBILICAL  2012    Procedure: HERNIORRHAPHY UMBILICAL;  UMBILICAL HERNIA REPAIR;  Surgeon: Ra Crocker MD;  Location: [facility]     ORTHOPEDIC SURGERY       ORTHOPEDIC SURGERY       OTHER SURGICAL HISTORY Right 2017    total KNEE ARTHROSCOPY      TOTAL KNEE ARTHROPLASTY Left 2018     ZZ NONSPECIFIC PROCEDURE  1997    achilles tendon     ZZC NONSPECIFIC PROCEDURE      knees, arthroscopy     ZZC NONSPECIFIC PROCEDURE      basal cell skin ca, nose     ZZC NONSPECIFIC PROCEDURE      flex sig; colonoscopy due 3/2008     ZZC NONSPECIFIC PROCEDURE      cardioversion for atr fib     ZZC TOTAL KNEE ARTHROPLASTY Left 2018    Dr. Malick Suarez       FAMILY HISTORY:  Family History   Problem Relation Age of Onset     Family History Negative Mother      Heart Disease Father         decesased at 42 - MI     Heart Disease Sister          MI     Lipids Sister      Lipids Sister      No Known Problems Sister      Heart Disease Brother          MI     Heart Disease Brother         CABG AT 49       SOCIAL HISTORY:  Social History     Socioeconomic History     Marital status: Single   Tobacco Use     Smoking status: Former     Packs/day: 1.00     Years: 10.00     Pack years: 10.00     Types: Cigarettes     Start date:      Quit date: 2011     Years since quittin.4     Smokeless tobacco: Never   Vaping Use     Vaping Use:  Never used   Substance and Sexual Activity     Alcohol use: No     Drug use: No     Sexual activity: Yes     Partners: Female

## 2023-01-20 ENCOUNTER — OFFICE VISIT (OUTPATIENT)
Dept: CARDIOLOGY | Facility: CLINIC | Age: 73
End: 2023-01-20
Attending: PHYSICIAN ASSISTANT
Payer: COMMERCIAL

## 2023-01-20 VITALS
DIASTOLIC BLOOD PRESSURE: 83 MMHG | HEIGHT: 68 IN | BODY MASS INDEX: 41.01 KG/M2 | HEART RATE: 57 BPM | SYSTOLIC BLOOD PRESSURE: 180 MMHG | WEIGHT: 270.6 LBS | OXYGEN SATURATION: 95 %

## 2023-01-20 DIAGNOSIS — I48.19 PERSISTENT ATRIAL FIBRILLATION (H): ICD-10-CM

## 2023-01-20 PROCEDURE — 93000 ELECTROCARDIOGRAM COMPLETE: CPT | Performed by: PHYSICIAN ASSISTANT

## 2023-01-20 PROCEDURE — 99214 OFFICE O/P EST MOD 30 MIN: CPT | Performed by: PHYSICIAN ASSISTANT

## 2023-01-20 NOTE — PATIENT INSTRUCTIONS
Brayden - it was good to see you today!    Reviewed the EKG today showed normal rhythm, just a little slow  Reviewed you're having vision issues but wondering if this is caused by the amiodarone  Thyroid test is back to normal (?as on lower dose of amiodarone)    PLAN:  STOP amiodarone 100 mg daily - see Eye Doc if vision changes don't get better    Keep track of HR and let us know if you go back into AFib. At that time we could opt to leave you in it, or talk to Dr. Day about a 3rd ablation or trying a different heart rhythm medication (sotalol)  Keep track of BP after stopping the cold/flu medications. Let us know if it is still high. [phone]  I'll have our group reach out to you about parking!  Keep off the alcohol!  Great work!  Limit sodium   [phone]

## 2023-01-20 NOTE — LETTER
1/20/2023    Loco Cummins MD  [address] [place] [id number]    RE: Mike Schultz       Dear Colleague,     I had the pleasure of seeing Mike Schultz in the [facility].  [facility]    I had the pleasure of seeing Brayden when he came for follow up of AFib.  This 72 year old sees Dr. Day for his history of:    1. H/o persistent AFib. EF 50-55% 1/2022 - first dx'd in 2012. Failed flecainide (and noted scar on MRI). Underwent PVI and SVC isolation 11/2017. Repeat PVI and CTI for inducible AFlutter 4/2019. Started on Amio with DCCV 11/19/2020 for recurrent AFib in setting of increased alcohol use.   Amiodarone decreased 1/2021; back in persistent AFib 12/2021 and amiodarone stopped as felt asx'c.  This was restarted 8/2022, now s/p DCCV 8/22/2022.  2. Moderate Aortic Stenosis - echo 10/2022 with mean gradient 18 mmHg  3. Benign Essential HTN   4. BOWEN   5. Myocardiac Scar noted on cardiac MRI 10/2017, consistent with prior MI in the left Cx territory. EF 51%.  Avoiding flecainide use  6. H/o Alcohol dependence - no EtOH since 2017, then again 3-4 months in early 2022 after he was noted to be back in AFib. Now no EtOH since 8/2022  7. Chronic AC  On Xarelto for CHADSVASc 2 (HTN, age)  8. Bell's Palsy    I saw Brayden 10/2022 at which time he was feeling quite good without recurrent atrial fibrillation after DCCV 8/2022.  We had stopped HCTZ and started furosemide, and he was having a good diuretic response with this with less DELEON.  He was tolerating amiodarone, though concerned that it was affecting his thyroid (TSH was abnl) and was causing weight gain.  Despite reviewing that he has had recurrent atrial fibrillation despite amiodarone 200 mg daily, we agreed to decrease amiodarone to 100 mg daily, noting if he had recurrence that he was symptomatic about, could increase amiodarone again with DCCV, consider third ablation or consider sotalol load.    He contacted  "us 1/7 noting low resting HR, which appeared asymptomatic.    Interval History:  Brayden doesn't want to be on Amiodarone any longer. He feels that for the last few weeks he's had some intermittent vision changes and still notes HR are 40s while sleeping and only 50s while awake. Occasional dizziness, lightheadedness, but very mild.     Denies any issues with orthopnea or PND but continues to note edema. Lasix helps and \"it works.\"      He had COVID a few months ago and notes he now he now has another respiratory illness and has been taking cough syrup. I think this is why his BP is so high, as it had been under good control prior.    VITALS:  Vitals: BP (!) 180/83   Pulse 57   Ht 1.727 m (5' 8\")   Wt 122.7 kg (270 lb 9.6 oz)   SpO2 95%   BMI 41.14 kg/m      Diagnostic Testing:  EKG today, which I overread, showed SB 55 bpm with 1st degree AVB. QTc ~480 ms on amiodarone 100 mg  Echo 10/18/2022 with low-normal LVEF 50-55%. Nl RV. MIld-mod AoS with mean gradient 18 mmHg, Vmax 2.9 m/s, MARILYN 1.3 cm2, DI 0.36. Mildly dilated asc ao 4.3 com  Echo 1/10/97062 showed EF 50-55% LVSF mildly reduced. No RWMA. Nl RV. LA mildly dilated 4.1 cm with volume index 35.7ml/m2. Nl MV. Trace TR. Trace AI. MARILYN 1.4 cm2 with mean gradient 12.7mmHg c/w mild-mod AoS. Asc aorta mildly dilated 4.1 cm  Holter Monitor 12/29/2021 on no AVn blocking agents showed AFib with avg HR 81 bpm (range 44--143 bpm). 2% PVC burden  EKG 7/2021 showed sinus bradycardia 45 bpm.  Nonspecific ST-T wave changes  Echocardiogram 3/2019 showed EF of 55-60%.  No regional wall motion abnormalities were noted.  He had 1+ mitral regurgitation and 1+ tricuspid regurgitation.  RV was normal in structure, function and size.  Aortic valve showed just trace to mild aortic regurgitation with aortic sclerosis.  Mean gradient was 4.6 mmHg (improved from previous) volume index is 46.7 mL/m  with LA dimension of 4.9 cm.  Cardiac MRI 10/2017 showed an EF of 51% with mild " hypokinesis involving the basal   Component      Latest Ref Rng & Units 6/30/2021 10/18/2022 1/17/2023   TSH      0.40 - 4.00 mU/L 2.22 4.33 (H)    T4 Free      0.76 - 1.46 ng/dL  1.06    TSH      0.30 - 4.20 uIU/mL   2.53     Component      Latest Ref Rng & Units 12/17/2022   Sodium      133 - 144 mmol/L 140   Potassium      3.4 - 5.3 mmol/L 4.6   Chloride      94 - 109 mmol/L 106   Carbon Dioxide      20 - 32 mmol/L 30   Anion Gap      3 - 14 mmol/L 4   Urea Nitrogen      7 - 30 mg/dL 15   Creatinine      0.66 - 1.25 mg/dL 1.10   Calcium      8.5 - 10.1 mg/dL 8.8   Glucose      70 - 99 mg/dL 105 (H)     Component      Latest Ref Rng & Units 10/18/2022 12/17/2022   Alkaline Phosphatase      40 - 150 U/L 75 69   AST      0 - 45 U/L 25 34   ALT      U/L 38 32   Protein Total      6.8 - 8.8 g/dL 7.4 6.9   Albumin      3.4 - 5.0 g/dL 3.4 3.5   Bilirubin Total      0.2 - 1.3 mg/dL 0.6 0.7   GFR Estimate      >60 mL/min/1.73m2  72   Bilirubin Direct      0.0 - 0.2 mg/dL <0.1      Plan:  Stop cold medication/NSAIDs, etc  See me back 3 months with EKG for BP and rhythm check    Assessment/Plan:    1. H/o persistent AFib    [place] he was having increasing DELEON/SOB from recurrent AFib, and underwent DCCV 8/2022.  Pretreated with amiodarone    Amiodarone decreased to 100 mg daily d/t concerns regarding weight gain and thyroid function    EKG today shows SB 55 bpm and acceptable QTc    PLAN:    Stop amiodarone per his wishes - bradycardia and vision changes    Reviewed that he would likely have recurrent atrial fibrillation and could discuss with Dr. Day a repeat (third) ablation vs consideration of sotalol therapy.    See me back 3 months with EKG    2. On amiodarone therapy    Amiodarone decreased to 100 mg daily 10/2022 d/t abnl TSH    As above, TSH 1/2023 and hepatic panel 12/2022 WNL      PLAN:    Stop amiodarone     3. HTN    BP is way too high. He had a terrible time parking here and was >10 minutes late, which could have  driven it up, but then also noted it was high at PCP's office. He's been taking lots of cough syrup/cold medication for respiratory illness/plugged ear    Remains on losartan 100 and Lasix 20    PLAN:    See me back 3 months    Stop cold/cough medication    4. Aortic Stenosis    Mild-moderate on echo 10/2022    PLAN:    Plan echo ~10/2023 (not yet ordered)    Crystal Campos PA-C, MSPAS      Orders Placed This Encounter   Procedures     Follow-Up with Cardiology OTTO     EKG 12-lead, tracing only     No orders of the defined types were placed in this encounter.    Medications Discontinued During This Encounter   Medication Reason     amiodarone (PACERONE) 200 MG tablet      fluticasone (FLONASE) 50 MCG/ACT nasal spray Stopped by Patient (No AVS)         Encounter Diagnosis   Name Primary?     Persistent atrial fibrillation (H)        CURRENT MEDICATIONS:  Current Outpatient Medications   Medication Sig Dispense Refill     allopurinol (ZYLOPRIM) 300 MG tablet Take 1 tablet (300 mg) by mouth daily.  **pt due for appointment--call [phone]** 90 tablet 0     cetirizine (ZYRTEC) 10 MG tablet Take 10 mg by mouth as needed for allergies       finasteride (PROPECIA) 1 MG tablet TAKE ONE TABLET BY MOUTH ONE TIME DAILY 90 tablet 0     furosemide (LASIX) 20 MG tablet Take 1 tablet (20 mg) by mouth daily       losartan (COZAAR) 100 MG tablet Take 1 tablet (100 mg) by mouth daily. Need annual visit 90 tablet 0     methocarbamol (ROBAXIN) 750 MG tablet Take 1 tablet (750 mg) by mouth 4 times daily as needed 30 tablet 0     potassium chloride ER (KLOR-CON M) 20 MEQ CR tablet Take 1 tablet (20 mEq) by mouth 2 times daily 180 tablet 0     rivaroxaban ANTICOAGULANT (XARELTO ANTICOAGULANT) 20 MG TABS tablet Take 1 tablet (20 mg) by mouth daily (with dinner) 90 tablet 3       ALLERGIES     Allergies   Allergen Reactions     No Known Drug Allergies          Review of Systems:  Skin:  Negative     Eyes:  Positive for visual blurring  ENT:  " Negative    Respiratory:  Negative for sleep apnea;dyspnea on exertion  Cardiovascular:  Negative for;palpitations;chest pain;exercise intolerance edema;Positive for  Gastroenterology: Negative for melena;hematochezia  Genitourinary:  Positive for nocturia  Musculoskeletal:  Negative for muscular weakness  Neurologic:  Positive for    Psychiatric:  Negative    Heme/Lymph/Imm:  Negative    Endocrine:  Negative      Physical Exam:  Vitals: BP (!) 180/83   Pulse 57   Ht 1.727 m (5' 8\")   Wt 122.7 kg (270 lb 9.6 oz)   SpO2 95%   BMI 41.14 kg/m      Constitutional:  cooperative, alert and oriented, well developed, well nourished, in no acute distress        Skin:  warm and dry to the touch, no apparent skin lesions or masses noted        Head:  normocephalic, no masses or lesions        Eyes:  not assessed this visit        ENT:           Neck:  not assessed this visit        Chest:  not assessed this visit        Cardiac: regular rhythm bradycardic       systolic ejection murmur        Abdomen:    obese      Vascular: not assessed this visit                                      Extremities and Back:  no deformities, clubbing, cyanosis, erythema observed        Neurological:  affect appropriate           PAST MEDICAL HISTORY:  Past Medical History:   Diagnosis Date     H/O ETOH abuse      Herpes simplex without mention of complication      Hyperlipidemia      Obesity      Persistent atrial fibrillation (H)     persistent, DCCV 12/2017, ablation 11/6/17     Tobacco use disorder     dced 6/2004       PAST SURGICAL HISTORY:  Past Surgical History:   Procedure Laterality Date     ABLATION OF DYSRHYTHMIC FOCUS  2018, 04/12/2019    Procedure: EP Ablation Focal AFIB;  Surgeon: Fady Day MD;  Location:  HEART CARDIAC CATH LAB     ACHILLES TENDON SURGERY  1997     ANESTHESIA CARDIOVERSION N/A 2/26/2019    Procedure: ANESTHESIA CARDIOVERSION (CONNER);  Surgeon: GENERIC ANESTHESIA PROVIDER;  Location:  OR     " ANESTHESIA CARDIOVERSION N/A 2020    Procedure: ANESTHESIA, FOR CARDIOVERSION (dr campbell);  Surgeon: GENERIC ANESTHESIA PROVIDER;  Location:  OR     ANESTHESIA CARDIOVERSION N/A 2022    Procedure: CARDIOVERSION;  Surgeon: GENERIC ANESTHESIA PROVIDER;  Location:  OR     BASAL CELL CARCINOMA EXCISION Right     nose, took cartilage from inner ear.     CARDIOVERSION  2019    cardioversion for atr fib     COLONOSCOPY       EP ABLATION FOCAL AFIB N/A 2019    Procedure: EP Ablation Focal AFIB;  Surgeon: Fady Day MD;  Location:  HEART CARDIAC CATH LAB     HERNIA REPAIR, UMBILICAL  2012    Procedure: HERNIORRHAPHY UMBILICAL;  UMBILICAL HERNIA REPAIR;  Surgeon: Ra Crocker MD;  Location: [facility]     HERNIORRHAPHY UMBILICAL  2012    Procedure: HERNIORRHAPHY UMBILICAL;  UMBILICAL HERNIA REPAIR;  Surgeon: Ra Crocker MD;  Location: [facility]     ORTHOPEDIC SURGERY       ORTHOPEDIC SURGERY       OTHER SURGICAL HISTORY Right 2017    total KNEE ARTHROSCOPY      TOTAL KNEE ARTHROPLASTY Left 2018     ZZC NONSPECIFIC PROCEDURE  1997    achilles tendon     ZZC NONSPECIFIC PROCEDURE      knees, arthroscopy     ZZC NONSPECIFIC PROCEDURE      basal cell skin ca, nose     ZZC NONSPECIFIC PROCEDURE      flex sig; colonoscopy due 3/2008     ZZC NONSPECIFIC PROCEDURE      cardioversion for atr fib     ZZC TOTAL KNEE ARTHROPLASTY Left 2018    Dr. Malick Suarez       FAMILY HISTORY:  Family History   Problem Relation Age of Onset     Family History Negative Mother      Heart Disease Father         decesased at 42 - MI     Heart Disease Sister          MI     Lipids Sister      Lipids Sister      No Known Problems Sister      Heart Disease Brother          MI     Heart Disease Brother         CABG AT 49       SOCIAL HISTORY:  Social History     Socioeconomic History     Marital status: Single   Tobacco Use     Smoking status: Former     Packs/day:  1.00     Years: 10.00     Pack years: 10.00     Types: Cigarettes     Start date:      Quit date: 2011     Years since quittin.4     Smokeless tobacco: Never   Vaping Use     Vaping Use: Never used   Substance and Sexual Activity     Alcohol use: No     Drug use: No     Sexual activity: Yes     Partners: Female            Thank you for allowing me to participate in the care of your patient.      Sincerely,     Natalie Campos PA-C     [facility]    cc:   Natalie Campos PA-C  [address] [facility] [address] [place] [id number]

## 2023-01-29 DIAGNOSIS — L65.9 ALOPECIA: ICD-10-CM

## 2023-01-30 RX ORDER — FINASTERIDE 1 MG/1
TABLET, FILM COATED ORAL
Qty: 90 TABLET | Refills: 0 | Status: SHIPPED | OUTPATIENT
Start: 2023-01-30 | End: 2023-05-15

## 2023-01-31 DIAGNOSIS — E87.6 HYPOKALEMIA: ICD-10-CM

## 2023-01-31 DIAGNOSIS — I10 BENIGN ESSENTIAL HYPERTENSION: ICD-10-CM

## 2023-01-31 RX ORDER — POTASSIUM CHLORIDE 1500 MG/1
20 TABLET, EXTENDED RELEASE ORAL 2 TIMES DAILY
Qty: 180 TABLET | Refills: 0 | Status: SHIPPED | OUTPATIENT
Start: 2023-01-31 | End: 2023-06-01

## 2023-02-01 ENCOUNTER — MYC MEDICAL ADVICE (OUTPATIENT)
Dept: CARDIOLOGY | Facility: CLINIC | Age: 73
End: 2023-02-01
Payer: COMMERCIAL

## 2023-02-01 NOTE — TELEPHONE ENCOUNTER
Spoke to pt and made him aware that his insurance is commercial insurance, so that he may use a co-pay card. Pt will go onto the Xarelto website and register to get his co-pay card ad then take to his pharmacy to see if it works to help with the cost. Pt states understanding. Miguel Ángel

## 2023-02-01 NOTE — TELEPHONE ENCOUNTER
Even though patient has Medicare, this patient's pharmacy benefits are through [employer] ([facility]), which is a commercial insurance.  Unfortunately, [facility] isn't contracted with this insurance, so I can't run a test claim.  I did call and confirm the patient has active coverage with a $4000 deductible.       Because this is a commercial plan, patient is eligible to download and use a copay card.  Because of the high deductible, switching to Eliquis and using its associated copay card would be more cost effective if the patient will be on an anticoagulant long-term.   --The [email or web address] copay card reduces copay to $10 per fill (up to a 90 day supply) for the first 90 days, and then reduces copay by $200 thereafter.    --The eliquis.com copay card reduces copay to $10 per fill every month, with a maximum savings of $6,400 per year.         Clarita Cerrato   Pharmacy Technician/Liaison, Discharge Pharmacy   [phone] (voice or text)  [email or web address]

## 2023-02-08 ENCOUNTER — TELEPHONE (OUTPATIENT)
Dept: GASTROENTEROLOGY | Facility: CLINIC | Age: 73
End: 2023-02-08
Payer: COMMERCIAL

## 2023-02-08 NOTE — TELEPHONE ENCOUNTER
Screening Questions  BLUE  KIND OF PREP RED  LOCATION [review exclusion criteria] GREEN  SEDATION TYPE        Yes Are you active on mychart?       [place] Ordering/Referring Provider?        [facility] What type of coverage do you have?      N Have you had a positive covid test in the last 14 days?     41.98 1. BMI  [BMI 40+ - review exclusion criteria]    Yes  2. Are you able to give consent for your medical care? [IF NO,RN REVIEW]          N  3. Are you taking any prescription pain medications on a routine schedule   (ex narcotics: oxycodone, roxicodone, oxycontin,  and percocet)? [RN Review]        NA  3a. EXTENDED PREP What kind of prescription?     N 4. Do you have any chemical dependencies such as alcohol, street drugs, or methadone?        **If yes 3- 5 , please schedule with MAC sedation.**          IF YES TO ANY 6 - 10 - HOSPITAL SETTING ONLY.     N 6.   Do you need assistance transferring?     N 7.   Have you had a heart or lung transplant?    N 8.   Are you currently on dialysis?   N 9.   Do you use daily home oxygen?   N 10. Do you take nitroglycerin?   10a. NA If yes, how often?     11. [FEMALES]  NA Are you currently pregnant?    11a. NA If yes, how many weeks? [ Greater than 12 weeks, OR NEEDED]    N 12. Do you have Pulmonary Hypertension? *NEED PAC APPT AT [facility] w/ [facility]*     N 13. [review exclusion criteria]  Do you have any implantable devices in your body (pacemaker, defib, LVAD)?    N 14. In the past 6 months, have you had any heart related issues including cardiomyopathy or heart attack?     14a. N If yes, did it require cardiac stenting if so when?     N 15. Have you had a stroke or Transient ischemic attack (TIA - aka  mini stroke ) within 6 months?      Yes-cpap used 16. Do you have mod to severe Obstructive Sleep Apnea?  [Hospital only]    N 17. Do you have SEVERE AND UNCONTROLLED asthma? *NEED PAC APPT AT [facility] w/MAC*     Yes-Xarelto 18. Are you currently taking any blood thinners?     18a. If yes,  "inform patient to \"follow up w/ ordering provider for bridging instructions.\"    N 19. Do you take the medication Phentermine?    19a. If yes, \"Hold for 7 days before procedure.  Please consult your prescribing provider if you have questions about holding this medication.\"     N  20. Do you have chronic kidney disease?      N  21. Do you have a diagnosis of diabetes?     N  22. On a regular basis do you go 3-5 days between bowel movements?      23. Preferred LOCAL Pharmacy for Pre Prescription    [ LIST ONLY ONE PHARMACY]     [facility] [address] [facility]        - CLOSING REMINDERS -    Informed patient they will need an adult    Cannot take any type of public or medical transportation alone    Conscious Sedation- Needs  for 6 hours after the procedure       MAC/General-Needs  for 24 hours after procedure    Pre-Procedure Covid test to be completed [[facility] PCR Testing Required]    Confirmed Nurse will call to complete assessment       - SCHEDULING DETAILS -  Yes Hospital Setting Required? If yes, what is the exclusion?: BOWEN   Nic  Surgeon    3/22/23  Date of Procedure  Lower Endoscopy [Colonoscopy]  Type of Procedure Scheduled  [facility] Which Colonoscopy Prep was Sent?   BMI  CS Sedation Type     No PAC / Pre-op Required                 "

## 2023-02-09 ENCOUNTER — OFFICE VISIT (OUTPATIENT)
Dept: OTOLARYNGOLOGY | Facility: CLINIC | Age: 73
End: 2023-02-09
Payer: COMMERCIAL

## 2023-02-09 ENCOUNTER — OFFICE VISIT (OUTPATIENT)
Dept: AUDIOLOGY | Facility: CLINIC | Age: 73
End: 2023-02-09
Payer: COMMERCIAL

## 2023-02-09 VITALS
BODY MASS INDEX: 41.05 KG/M2 | DIASTOLIC BLOOD PRESSURE: 87 MMHG | HEART RATE: 104 BPM | SYSTOLIC BLOOD PRESSURE: 149 MMHG | WEIGHT: 270 LBS

## 2023-02-09 DIAGNOSIS — H53.8 BLURRED VISION: ICD-10-CM

## 2023-02-09 DIAGNOSIS — H90.3 SENSORINEURAL HEARING LOSS, BILATERAL: Primary | ICD-10-CM

## 2023-02-09 DIAGNOSIS — H93.8X1 PLUGGED FEELING IN EAR, RIGHT: ICD-10-CM

## 2023-02-09 DIAGNOSIS — R42 DIZZINESS: Primary | ICD-10-CM

## 2023-02-09 PROCEDURE — 92557 COMPREHENSIVE HEARING TEST: CPT

## 2023-02-09 PROCEDURE — 99203 OFFICE O/P NEW LOW 30 MIN: CPT | Performed by: OTOLARYNGOLOGY

## 2023-02-09 PROCEDURE — 92550 TYMPANOMETRY & REFLEX THRESH: CPT

## 2023-02-09 NOTE — PROGRESS NOTES
AUDIOLOGY REPORT:    Patient was referred to [facility] from ENT by Dr. Chi for a hearing examination. Patient reports right aural fullness, dizziness and a sensation of 'fluttering' that began after having bells palsy ' a couple of months ago'. He denies pain, drainage, change in hearing, family history of hearing loss, history of ear surgeries and history of noise exposure    Testing:    Otoscopy:   Otoscopic exam indicates ears are clear of cerumen bilaterally     Tympanograms:    RIGHT: normal eardrum mobility     LEFT:   normal eardrum mobility    Reflexes (reported by stimulus ear): 1000 Hz  RIGHT: Ipsilateral is absent at frequencies tested  RIGHT: Contralateral is present at normal levels  LEFT:   Ipsilateral is present at normal levels  LEFT:   Contralateral is absent at frequencies tested    Thresholds:   Pure Tone Thresholds assessed using conventional audiometry with good  reliability from 250-8000 Hz bilaterally using insert earphones and circumaural headphones     RIGHT:  normal sloping to severe sensorineural hearing loss    LEFT:    normal sloping to severe sensorineural hearing loss    Speech Reception Threshold:    RIGHT: 25 dB HL    LEFT:   15 dB HL  Speech Reception Thresholds are in good agreement with pure tone thresholds    Word Recognition Score:     RIGHT: 100% at 60 dB HL using NU-6 recorded word list.    LEFT:   100% at 60 dB HL using NU-6 recorded word list.    Discussed results with the patient. Patient is not interested in amplification at this time.      Patient was returned to ENT for follow up.     Alessia Baxter, CCC-A  Licensed Audiologist  [place] #[id number]    02/09/23

## 2023-02-09 NOTE — LETTER
"    2/9/2023         RE: Mike Schultz  [id number] [facility] [id number]        Dear Colleague,    Thank you for referring your patient, Mike Schultz, to the [facility]. Please see a copy of my visit note below.    ENT Consultation    Mike Schultz is a 72 year old male who is seen in consultation at the request of self.      History of Present Illness - Mike Schultz is a 72 year old male presents for evaluation of several issues regarding his right ear.  Apparently recently he had Bell's palsy and took about 6 months to regain full muscle control.  However he started having some feeling of \"plugging\" as well as flutter in his right ear pressure sometimes when he is outside.  It is not all the time.  He he denies true vertigo but has had some nonspecific dizziness disequilibrium and often gets some blurriness of his vision especially when he reads in the computer.  He denies history of migraines.  Denies any other headaches.  Denies any other neurological symptoms.      Past Medical History -   Past Medical History:   Diagnosis Date     H/O ETOH abuse      Herpes simplex without mention of complication      Hyperlipidemia      Obesity      Persistent atrial fibrillation (H)     persistent, DCCV 12/2017, ablation 11/6/17     Tobacco use disorder     dced 6/2004       Current Medications -   Current Outpatient Medications:      allopurinol (ZYLOPRIM) 300 MG tablet, Take 1 tablet (300 mg) by mouth daily.  **pt due for appointment--call [phone]**, Disp: 90 tablet, Rfl: 0     cetirizine (ZYRTEC) 10 MG tablet, Take 10 mg by mouth as needed for allergies, Disp: , Rfl:      finasteride (PROPECIA) 1 MG tablet, TAKE ONE TABLET BY MOUTH ONE TIME DAILY, Disp: 90 tablet, Rfl: 0     furosemide (LASIX) 20 MG tablet, Take 1 tablet (20 mg) by mouth daily, Disp: , Rfl:      losartan (COZAAR) 100 MG tablet, Take 1 tablet (100 mg) by mouth daily. Need annual visit, Disp: 90 " tablet, Rfl: 0     methocarbamol (ROBAXIN) 750 MG tablet, Take 1 tablet (750 mg) by mouth 4 times daily as needed, Disp: 30 tablet, Rfl: 0     potassium chloride ER (KLOR-CON M) 20 MEQ CR tablet, Take 1 tablet (20 mEq) by mouth 2 times daily, Disp: 180 tablet, Rfl: 0     rivaroxaban ANTICOAGULANT (XARELTO ANTICOAGULANT) 20 MG TABS tablet, Take 1 tablet (20 mg) by mouth daily (with dinner), Disp: 90 tablet, Rfl: 3    Allergies -   Allergies   Allergen Reactions     No Known Drug Allergies        Social History -   Social History     Socioeconomic History     Marital status: Single     Spouse name: None     Number of children: None     Years of education: None     Highest education level: None   Tobacco Use     Smoking status: Former     Packs/day: 1.00     Years: 10.00     Pack years: 10.00     Types: Cigarettes     Start date:      Quit date: 2011     Years since quittin.4     Smokeless tobacco: Never   Vaping Use     Vaping Use: Never used   Substance and Sexual Activity     Alcohol use: No     Drug use: No     Sexual activity: Yes     Partners: Female       Family History -   Family History   Problem Relation Age of Onset     Family History Negative Mother      Heart Disease Father         decesased at 42 - MI     Heart Disease Sister          MI     Lipids Sister      Lipids Sister      No Known Problems Sister      Heart Disease Brother          MI     Heart Disease Brother         CABG AT 49       Review of Systems - As per HPI and PMHx, otherwise review of system review of the head and neck negative. Otherwise 10+ review systems negative.    Physical Exam  BP (!) 149/87   Pulse 104   Wt 122.5 kg (270 lb)   BMI 41.05 kg/m    BMI: Body mass index is 41.05 kg/m .    General - The patient is well nourished and well developed, and appears to have good nutritional status.  Alert and oriented to person and place, answers questions and cooperates with examination appropriately.    SKIN  - No suspicious lesions or rashes.  Respiration - No respiratory distress.  Head and Face - Normocephalic and atraumatic, with no gross asymmetry noted of the contour of the facial features.  The facial nerve is intact, with strong symmetric movements.    Voice and Breathing - The patient was breathing comfortably without the use of accessory muscles. The patients voice was clear and strong, and had appropriate pitch and quality.    Ears - Bilateral pinna and EACs with normal appearing overlying skin. Tympanic membrane intact with good mobility on pneumatic otoscopy bilaterally. Bony landmarks of the ossicular chain are normal. The tympanic membranes are normal in appearance. No retraction, perforation, or masses.  No fluid or purulence was seen in the external canal or the middle ear.     Eyes - Extraocular movements intact.  Sclera were not icteric or injected, conjunctiva were pink and moist.    Mouth - Examination of the oral cavity showed pink, healthy oral mucosa. No lesions or ulcerations noted.  The tongue was mobile and midline, and the dentition were in good condition.      Throat - The walls of the oropharynx were smooth, pink, moist, symmetric, and had no lesions or ulcerations.  The tonsillar pillars and soft palate were symmetric.  The uvula was midline on elevation.  No evidence of palatal myoclonus.  Palate moves symmetrically.  Neck - Normal midline excursion of the laryngotracheal complex during swallowing.  Full range of motion on passive movement.  Palpation of the occipital, submental, submandibular, internal jugular chain, and supraclavicular nodes did not demonstrate any abnormal lymph nodes or masses.  The carotid pulse was palpable bilaterally.  Palpation of the thyroid was soft and smooth, with no nodules or goiter appreciated.  The trachea was mobile and midline.    Nose - External contour is symmetric, no gross deflection or scars.  Nasal mucosa is pink and moist with no abnormal mucus.   The septum was midline and non-obstructive, turbinates of normal size and position.  No polyps, masses, or purulence noted on examination.    Neuro - Nonfocal neuro exam is normal, CN 2 through 12 intact, normal gait and muscle tone.  Facial nerve function House-Brackman 1 out of 6.      Performed in clinic today:  Audiologic Studies - An audiogram and tympanogram were performed today as part of the evaluation and personally reviewed. The tympanogram shows Type A curves on the right and Type A curves on the left, with Normal canal volumes and middle ear pressures.  The audiogram showed Mild to moderate sloping high-frequency SNHL on the right and Mild to moderate sloping high-frequency SNHLon the left.    Word recognition score 100% on the right and 100% on the left.    A/P - Mike Schultz is a 72 year old male with a nonspecific symptoms of some plugging and fluttering in the right ear as he has resolved his right-sided Bell's palsy and also some other neurologic symptoms of dizziness disequilibrium from time to time and blurry vision when he concentrates in the computer.  This is new.  Due to his new neurologic symptoms we discussed further evaluation with imaging.  MRI of the brain and internal auditory canals will be ordered.  Patient will be contacted with the results.  Assuming results are normal which  will follow conservatively to see how he is resolving this current set of symptoms.  Suggest rechecking hearing in a year.  He will see us back earlier if symptoms persist      Luis Manuel Chi MD      Again, thank you for allowing me to participate in the care of your patient.        Sincerely,        Luis Manuel Chi MD, MD

## 2023-02-09 NOTE — PROGRESS NOTES
"ENT Consultation    Mike Schultz is a 72 year old male who is seen in consultation at the request of self.      History of Present Illness - Mike Schultz is a 72 year old male presents for evaluation of several issues regarding his right ear.  Apparently recently he had Bell's palsy and took about 6 months to regain full muscle control.  However he started having some feeling of \"plugging\" as well as flutter in his right ear pressure sometimes when he is outside.  It is not all the time.  He he denies true vertigo but has had some nonspecific dizziness disequilibrium and often gets some blurriness of his vision especially when he reads in the computer.  He denies history of migraines.  Denies any other headaches.  Denies any other neurological symptoms.      Past Medical History -   Past Medical History:   Diagnosis Date     H/O ETOH abuse      Herpes simplex without mention of complication      Hyperlipidemia      Obesity      Persistent atrial fibrillation (H)     persistent, DCCV 12/2017, ablation 11/6/17     Tobacco use disorder     dced 6/2004       Current Medications -   Current Outpatient Medications:      allopurinol (ZYLOPRIM) 300 MG tablet, Take 1 tablet (300 mg) by mouth daily.  **pt due for appointment--call [phone]**, Disp: 90 tablet, Rfl: 0     cetirizine (ZYRTEC) 10 MG tablet, Take 10 mg by mouth as needed for allergies, Disp: , Rfl:      finasteride (PROPECIA) 1 MG tablet, TAKE ONE TABLET BY MOUTH ONE TIME DAILY, Disp: 90 tablet, Rfl: 0     furosemide (LASIX) 20 MG tablet, Take 1 tablet (20 mg) by mouth daily, Disp: , Rfl:      losartan (COZAAR) 100 MG tablet, Take 1 tablet (100 mg) by mouth daily. Need annual visit, Disp: 90 tablet, Rfl: 0     methocarbamol (ROBAXIN) 750 MG tablet, Take 1 tablet (750 mg) by mouth 4 times daily as needed, Disp: 30 tablet, Rfl: 0     potassium chloride ER (KLOR-CON M) 20 MEQ CR tablet, Take 1 tablet (20 mEq) by mouth 2 times daily, Disp: 180 tablet, Rfl: " 0     rivaroxaban ANTICOAGULANT (XARELTO ANTICOAGULANT) 20 MG TABS tablet, Take 1 tablet (20 mg) by mouth daily (with dinner), Disp: 90 tablet, Rfl: 3    Allergies -   Allergies   Allergen Reactions     No Known Drug Allergies        Social History -   Social History     Socioeconomic History     Marital status: Single     Spouse name: None     Number of children: None     Years of education: None     Highest education level: None   Tobacco Use     Smoking status: Former     Packs/day: 1.00     Years: 10.00     Pack years: 10.00     Types: Cigarettes     Start date:      Quit date: 2011     Years since quittin.4     Smokeless tobacco: Never   Vaping Use     Vaping Use: Never used   Substance and Sexual Activity     Alcohol use: No     Drug use: No     Sexual activity: Yes     Partners: Female       Family History -   Family History   Problem Relation Age of Onset     Family History Negative Mother      Heart Disease Father         decesased at 42 - MI     Heart Disease Sister          MI     Lipids Sister      Lipids Sister      No Known Problems Sister      Heart Disease Brother          MI     Heart Disease Brother         CABG AT 49       Review of Systems - As per HPI and PMHx, otherwise review of system review of the head and neck negative. Otherwise 10+ review systems negative.    Physical Exam  BP (!) 149/87   Pulse 104   Wt 122.5 kg (270 lb)   BMI 41.05 kg/m    BMI: Body mass index is 41.05 kg/m .    General - The patient is well nourished and well developed, and appears to have good nutritional status.  Alert and oriented to person and place, answers questions and cooperates with examination appropriately.    SKIN - No suspicious lesions or rashes.  Respiration - No respiratory distress.  Head and Face - Normocephalic and atraumatic, with no gross asymmetry noted of the contour of the facial features.  The facial nerve is intact, with strong symmetric movements.    Voice and  Breathing - The patient was breathing comfortably without the use of accessory muscles. The patients voice was clear and strong, and had appropriate pitch and quality.    Ears - Bilateral pinna and EACs with normal appearing overlying skin. Tympanic membrane intact with good mobility on pneumatic otoscopy bilaterally. Bony landmarks of the ossicular chain are normal. The tympanic membranes are normal in appearance. No retraction, perforation, or masses.  No fluid or purulence was seen in the external canal or the middle ear.     Eyes - Extraocular movements intact.  Sclera were not icteric or injected, conjunctiva were pink and moist.    Mouth - Examination of the oral cavity showed pink, healthy oral mucosa. No lesions or ulcerations noted.  The tongue was mobile and midline, and the dentition were in good condition.      Throat - The walls of the oropharynx were smooth, pink, moist, symmetric, and had no lesions or ulcerations.  The tonsillar pillars and soft palate were symmetric.  The uvula was midline on elevation.  No evidence of palatal myoclonus.  Palate moves symmetrically.  Neck - Normal midline excursion of the laryngotracheal complex during swallowing.  Full range of motion on passive movement.  Palpation of the occipital, submental, submandibular, internal jugular chain, and supraclavicular nodes did not demonstrate any abnormal lymph nodes or masses.  The carotid pulse was palpable bilaterally.  Palpation of the thyroid was soft and smooth, with no nodules or goiter appreciated.  The trachea was mobile and midline.    Nose - External contour is symmetric, no gross deflection or scars.  Nasal mucosa is pink and moist with no abnormal mucus.  The septum was midline and non-obstructive, turbinates of normal size and position.  No polyps, masses, or purulence noted on examination.    Neuro - Nonfocal neuro exam is normal, CN 2 through 12 intact, normal gait and muscle tone.  Facial nerve function  House-Brackman 1 out of 6.      Performed in clinic today:  Audiologic Studies - An audiogram and tympanogram were performed today as part of the evaluation and personally reviewed. The tympanogram shows Type A curves on the right and Type A curves on the left, with Normal canal volumes and middle ear pressures.  The audiogram showed Mild to moderate sloping high-frequency SNHL on the right and Mild to moderate sloping high-frequency SNHLon the left.    Word recognition score 100% on the right and 100% on the left.    A/P - Mike Schultz is a 72 year old male with a nonspecific symptoms of some plugging and fluttering in the right ear as he has resolved his right-sided Bell's palsy and also some other neurologic symptoms of dizziness disequilibrium from time to time and blurry vision when he concentrates in the computer.  This is new.  Due to his new neurologic symptoms we discussed further evaluation with imaging.  MRI of the brain and internal auditory canals will be ordered.  Patient will be contacted with the results.  Assuming results are normal which  will follow conservatively to see how he is resolving this current set of symptoms.  Suggest rechecking hearing in a year.  He will see us back earlier if symptoms persist      Luis Manuel Chi MD

## 2023-02-17 ENCOUNTER — HOSPITAL ENCOUNTER (OUTPATIENT)
Dept: MRI IMAGING | Facility: CLINIC | Age: 73
Discharge: HOME | End: 2023-02-17
Attending: OTOLARYNGOLOGY | Admitting: OTOLARYNGOLOGY
Payer: COMMERCIAL

## 2023-02-17 DIAGNOSIS — R42 DIZZINESS: ICD-10-CM

## 2023-02-17 PROCEDURE — 70553 MRI BRAIN STEM W/O & W/DYE: CPT

## 2023-02-17 PROCEDURE — A9585 GADOBUTROL INJECTION: HCPCS | Performed by: OTOLARYNGOLOGY

## 2023-02-17 PROCEDURE — 255N000002 HC RX 255 OP 636: Performed by: OTOLARYNGOLOGY

## 2023-02-17 RX ORDER — GADOBUTROL 604.72 MG/ML
12 INJECTION INTRAVENOUS ONCE
Status: COMPLETED | OUTPATIENT
Start: 2023-02-17 | End: 2023-02-17

## 2023-02-17 RX ADMIN — GADOBUTROL 12 ML: 604.72 INJECTION INTRAVENOUS at 07:35

## 2023-02-19 DIAGNOSIS — M10.9 GOUT OF FOOT, UNSPECIFIED CAUSE, UNSPECIFIED CHRONICITY, UNSPECIFIED LATERALITY: ICD-10-CM

## 2023-02-20 RX ORDER — ALLOPURINOL 300 MG/1
TABLET ORAL
Qty: 90 TABLET | Refills: 0 | Status: SHIPPED | OUTPATIENT
Start: 2023-02-20 | End: 2023-06-02

## 2023-03-10 ENCOUNTER — MYC MEDICAL ADVICE (OUTPATIENT)
Dept: INTERNAL MEDICINE | Facility: CLINIC | Age: 73
End: 2023-03-10
Payer: COMMERCIAL

## 2023-03-16 RX ORDER — BISACODYL 5 MG/1
TABLET, DELAYED RELEASE ORAL
Qty: 4 TABLET | Refills: 0 | Status: SHIPPED | OUTPATIENT
Start: 2023-03-16 | End: 2023-04-20

## 2023-03-18 NOTE — TELEPHONE ENCOUNTER
Problem: PAIN - ADULT  Goal: Verbalizes/displays adequate comfort level or baseline comfort level  Description: Interventions:  - Encourage patient to monitor pain and request assistance  - Assess pain using appropriate pain scale (0-10 pain scale)  - Administer analgesics based on type and severity of pain and evaluate response  - Implement non-pharmacological measures as appropriate and evaluate response  - Consider cultural and social influences on pain and pain management  - Notify physician/advanced practitioner if interventions unsuccessful or patient reports new pain  Outcome: Progressing     Problem: INFECTION - ADULT  Goal: Absence or prevention of progression during hospitalization  Description: INTERVENTIONS:  - Assess and monitor for signs and symptoms of infection  - Monitor lab/diagnostic results  - Monitor all insertion sites, i e  indwelling lines  - Administer medications as ordered  - Instruct and encourage patient and family to use good hand hygiene technique  Outcome: Progressing     Problem: SAFETY ADULT  Goal: Patient will remain free of falls  Description: INTERVENTIONS:  - Educate patient/family on patient safety including physical limitations  - Instruct patient to call for assistance with activity (min assist x 1 and walker)  - Consult OT/PT to assist with strengthening/mobility   - Keep Call bell within reach  - Keep bed low and locked with side rails adjusted as appropriate  - Keep care items and personal belongings within reach  - Initiate and maintain comfort rounds  - Make Fall Risk Sign visible to staff (high fall risk)  - Offer Toileting every 1-2 Hours, in advance of need  - Initiate/Maintain bed/chair alarm  - Obtain necessary fall risk management equipment: non-skid footwear  - Apply yellow socks and bracelet for high fall risk patients  - Consider moving patient to room near nurses station  Outcome: Progressing  Goal: Maintain or return to baseline ADL function  Description: Script Signed    Asia Espinosa RN   INTERVENTIONS:  -  Assess patient's ability to carry out ADLs; (min to mod assist)  - Assess/evaluate cause of self-care deficits (orthostatic hypotension, fatigue, limited movement)  - Assess range of motion  - Assess patient's mobility; (min assist x 1 and walker)  - Assess patient's need for assistive devices and provide as appropriate  - Encourage maximum independence but intervene and supervise when necessary  - Involve family in performance of ADLs  - Assess for home care needs following discharge   - Consider OT consult to assist with ADL evaluation and planning for discharge  - Provide patient education as appropriate  Outcome: Progressing  Goal: Maintains/Returns to pre admission functional level  Description: INTERVENTIONS:  - Perform BMAT or MOVE assessment daily    - Set and communicate daily mobility goal to care team and patient/family/caregiver  - Collaborate with rehabilitation services on mobility goals if consulted  - Perform Range of Motion 3 times a day  - Reposition patient every 2 hours    - Dangle patient 3 times a day  - Stand patient 3 times a day  - Ambulate patient 3 times a day  - Out of bed to chair 3 times a day   - Out of bed for meals 3 times a day  - Out of bed for toileting  - Record patient progress and toleration of activity level   Outcome: Progressing     Problem: DISCHARGE PLANNING  Goal: Discharge to home or other facility with appropriate resources  Description: INTERVENTIONS:  - Identify barriers to discharge w/patient and caregiver  - Arrange for needed discharge resources and transportation as appropriate  - Identify discharge learning needs (meds, wound care, etc )  - Refer to Case Management Department for coordinating discharge planning if the patient needs post-hospital services based on physician/advanced practitioner order or complex needs related to functional status, cognitive ability, or social support system  Outcome: Progressing     Problem: Knowledge Deficit  Goal: Patient/family/caregiver demonstrates understanding of disease process, treatment plan, medications, and discharge instructions  Description: Complete learning assessment and assess knowledge base    Interventions:  - Provide teaching at level of understanding  - Provide teaching via preferred learning methods  Outcome: Progressing     Problem: CARDIOVASCULAR - ADULT  Goal: Maintains optimal cardiac output and hemodynamic stability  Description: INTERVENTIONS:  - Monitor I/O, vital signs and rhythm  - Monitor for S/S and trends of decreased cardiac output  - Assess quality of pulses, skin color and temperature  - Assess for signs of decreased coronary artery perfusion  - Instruct patient to report change in severity of symptoms  Outcome: Progressing  Goal: Absence of cardiac dysrhythmias or at baseline rhythm  Description: INTERVENTIONS:  - Continuous cardiac monitoring, vital signs, obtain 12 lead EKG if ordered  - Administer antiarrhythmic and heart rate control medications as ordered  - Monitor electrolytes and administer replacement therapy as ordered  Outcome: Progressing     Problem: RESPIRATORY - ADULT  Goal: Achieves optimal ventilation and oxygenation  Description: INTERVENTIONS:  - Assess for changes in respiratory status  - Assess for changes in mentation and behavior  - Position to facilitate oxygenation and minimize respiratory effort  - Oxygen administered by appropriate delivery if ordered  - Encourage broncho-pulmonary hygiene including cough, deep breathe, Incentive Spirometry  - Assess and instruct to report SOB or any respiratory difficulty  - Respiratory Therapy support as indicated  Outcome: Progressing     Problem: GENITOURINARY - ADULT  Goal: Maintains or returns to baseline urinary function  Description: INTERVENTIONS:  - Assess urinary function  - Encourage oral fluids to ensure adequate hydration if ordered  - Administer IV fluids as ordered to ensure adequate hydration  - Administer ordered medications as needed  - Offer frequent toileting  - Follow urinary retention protocol if ordered  Outcome: Progressing  Goal: Absence of urinary retention  Description: INTERVENTIONS:  - Assess patient's ability to void and empty bladder (patient has morales catheter intact)  - Monitor I/O  - Discuss with physician/AP medications to alleviate retention as needed (follow up with urology as outpatient)  - Discuss catheterization for long term situations as appropriate (patient has morales catheter intact)  Outcome: Progressing  Goal: Urinary catheter remains patent  Description: INTERVENTIONS:  - Assess patency of urinary catheter  - Follow guidelines for intermittent irrigation of non-functioning urinary catheter  Outcome: Progressing     Problem: METABOLIC, FLUID AND ELECTROLYTES - ADULT  Goal: Electrolytes maintained within normal limits  Description: INTERVENTIONS:  - Monitor labs and assess patient for signs and symptoms of electrolyte imbalances  - Administer electrolyte replacement as ordered  - Monitor response to electrolyte replacements, including repeat lab results as appropriate  - Instruct patient on fluid and nutrition as appropriate  Outcome: Progressing  Goal: Fluid balance maintained  Description: INTERVENTIONS:  - Monitor labs   - Monitor I/O and WT  - Instruct patient on fluid and nutrition as appropriate  - Assess for signs & symptoms of volume excess or deficit  Outcome: Progressing  Goal: Glucose maintained within target range  Description: INTERVENTIONS:  - Monitor Blood Glucose as ordered  - Assess for signs and symptoms of hyperglycemia and hypoglycemia  - Administer ordered medications to maintain glucose within target range  - Assess nutritional intake and initiate nutrition service referral as needed  Outcome: Progressing     Problem: MUSCULOSKELETAL - ADULT  Goal: Maintain or return mobility to safest level of function  Description: INTERVENTIONS:  - Assess patient's ability to carry out ADLs; (min to mod assist)  - Assess/evaluate cause of self-care deficits (orthostatic hypotension, fatigue, limited movement)  - Assess range of motion  - Assess patient's mobility (min assist x 1 and walker)  - Assess patient's need for assistive devices and provide as appropriate  - Encourage maximum independence but intervene and supervise when necessary  - Involve family in performance of ADLs  - Assess for home care needs following discharge   - Consider OT consult to assist with ADL evaluation and planning for discharge  - Provide patient education as appropriate  Outcome: Progressing     Problem: MOBILITY - ADULT  Goal: Maintain or return to baseline ADL function  Description: INTERVENTIONS:  -  Assess patient's ability to carry out ADLs; (min to mod assist)  - Assess/evaluate cause of self-care deficits (orthostatic hypotension, fatigue, limited movement)  - Assess range of motion  - Assess patient's mobility; (min assist x 1 and walker)  - Assess patient's need for assistive devices and provide as appropriate  - Encourage maximum independence but intervene and supervise when necessary  - Involve family in performance of ADLs  - Assess for home care needs following discharge   - Consider OT consult to assist with ADL evaluation and planning for discharge  - Provide patient education as appropriate  Outcome: Progressing  Goal: Maintains/Returns to pre admission functional level  Description: INTERVENTIONS:  - Perform BMAT or MOVE assessment daily    - Set and communicate daily mobility goal to care team and patient/family/caregiver  - Collaborate with rehabilitation services on mobility goals if consulted  - Perform Range of Motion 3 times a day  - Reposition patient every 2 hours    - Dangle patient 3 times a day  - Stand patient 3 times a day  - Ambulate patient 3 times a day  - Out of bed to chair 3 times a day   - Out of bed for meals 3 times a day  - Out of bed for toileting  - Record patient progress and toleration of activity level   Outcome: Progressing     Problem: Prexisting or High Potential for Compromised Skin Integrity  Goal: Skin integrity is maintained or improved  Description: INTERVENTIONS:  - Identify patients at risk for skin breakdown  - Assess and monitor skin integrity  - Assess and monitor nutrition and hydration status  - Monitor labs   - Assess for incontinence (morales catheter intact)  - Turn and reposition patient (cue to turn self)  - Assist with mobility/ambulation (min assist x 1 and walker)  - Relieve pressure over bony prominences  - Avoid friction and shearing  - Provide appropriate hygiene as needed including keeping skin clean and dry  - Evaluate need for skin moisturizer/barrier cream  - Collaborate with interdisciplinary team   - Patient/family teaching  Outcome: Progressing

## 2023-03-22 ENCOUNTER — HOSPITAL ENCOUNTER (OUTPATIENT)
Facility: CLINIC | Age: 73
Discharge: HOME | End: 2023-03-22
Attending: COLON & RECTAL SURGERY | Admitting: COLON & RECTAL SURGERY
Payer: COMMERCIAL

## 2023-03-22 VITALS
RESPIRATION RATE: 25 BRPM | DIASTOLIC BLOOD PRESSURE: 85 MMHG | OXYGEN SATURATION: 96 % | HEART RATE: 56 BPM | SYSTOLIC BLOOD PRESSURE: 164 MMHG

## 2023-03-22 DIAGNOSIS — Z12.11 ENCOUNTER FOR SCREENING COLONOSCOPY: Primary | ICD-10-CM

## 2023-03-22 LAB — COLONOSCOPY: NORMAL

## 2023-03-22 PROCEDURE — G0500 MOD SEDAT ENDO SERVICE >5YRS: HCPCS | Performed by: COLON & RECTAL SURGERY

## 2023-03-22 PROCEDURE — 88305 TISSUE EXAM BY PATHOLOGIST: CPT | Mod: TC | Performed by: COLON & RECTAL SURGERY

## 2023-03-22 PROCEDURE — 45385 COLONOSCOPY W/LESION REMOVAL: CPT | Mod: PT | Performed by: COLON & RECTAL SURGERY

## 2023-03-22 PROCEDURE — 250N000011 HC RX IP 250 OP 636: Performed by: COLON & RECTAL SURGERY

## 2023-03-22 RX ORDER — LIDOCAINE 40 MG/G
CREAM TOPICAL
Status: DISCONTINUED | OUTPATIENT
Start: 2023-03-22 | End: 2023-03-22 | Stop reason: HOSPADM

## 2023-03-22 RX ORDER — ONDANSETRON 2 MG/ML
4 INJECTION INTRAMUSCULAR; INTRAVENOUS
Status: DISCONTINUED | OUTPATIENT
Start: 2023-03-22 | End: 2023-03-22 | Stop reason: HOSPADM

## 2023-03-22 RX ORDER — FENTANYL CITRATE 50 UG/ML
INJECTION, SOLUTION INTRAMUSCULAR; INTRAVENOUS PRN
Status: DISCONTINUED | OUTPATIENT
Start: 2023-03-22 | End: 2023-03-22 | Stop reason: HOSPADM

## 2023-03-22 ASSESSMENT — ACTIVITIES OF DAILY LIVING (ADL): ADLS_ACUITY_SCORE: 33

## 2023-03-22 NOTE — H&P
Colon & Rectal Surgery History and Physical  Pre-Endoscopy Procedure Note    History of Present Illness   I have been asked by Dr. Cummins to evaluate this 72 year old male for colorectal cancer screening. He currently denies any abdominal pain, weight loss, bleeding per rectum, or recent change in bowel habits.    Past Medical History  Diagnosis Date     Cancer     basal nose     H/O ETOH abuse      Herpes simplex without mention of complication      Hyperlipidemia      Obesity      Persistent atrial fibrillation     persistent, DCCV 12/2017, ablation 11/6/17     Sleep apnea     CPAP     Tobacco use disorder        Past Surgical History  Procedure Laterality Date     ABLATION OF DYSRHYTHMIC FOCUS  2018, 04/12/2019    Procedure: EP Ablation Focal AFIB;  Surgeon: Fady Day MD;  [facility]     ACHILLES TENDON SURGERY  1997     BASAL CELL CARCINOMA EXCISION Right 2009    nose, took cartilage from inner ear.     CARDIOVERSION  2012, 2019    cardioversion for atr fib     ABLATION FOCAL AFIB N/A 4/12/2019    Procedure: EP Ablation Focal AFIB;  Surgeon: Fady Day MD;  Location:  [facility]     HERNIA REPAIR, UMBILICAL  08/20/2012    Procedure: HERNIORRHAPHY UMBILICAL;  UMBILICAL HERNIA REPAIR;  Surgeon: Ra Crocker MD;  Location: [facility]     HERNIORRHAPHY UMBILICAL  8/20/2012    Procedure: HERNIORRHAPHY UMBILICAL;  UMBILICAL HERNIA REPAIR;  Surgeon: Ra Crocker MD;  Location: [facility]     TOTAL KNEE ARTHROPLASTY Right 03/2017    total KNEE ARTHROSCOPY      TOTAL KNEE ARTHROPLASTY Left 03/16/2018     ORTHOPEDIC PROCEDURE  1997    achilles tendon        Medications  Medication Sig     allopurinol (ZYLOPRIM) 300 MG tablet Take 1 tablet (300 mg) by mouth daily.  **pt due for appointment--call [phone]**     cetirizine (ZYRTEC) 10 MG tablet Take 10 mg by mouth as needed for allergies     finasteride (PROPECIA) 1 MG tablet TAKE ONE TABLET BY MOUTH ONE TIME DAILY      furosemide (LASIX) 20 MG tablet Take 1 tablet (20 mg) by mouth daily     losartan (COZAAR) 100 MG tablet Take 1 tablet (100 mg) by mouth daily. Need annual visit     methocarbamol (ROBAXIN) 750 MG tablet Take 1 tablet (750 mg) by mouth 4 times daily as needed     potassium chloride ER (KLOR-CON M) 20 MEQ CR tablet Take 1 tablet (20 mEq) by mouth 2 times daily     rivaroxaban ANTICOAGULANT (XARELTO ANTICOAGULANT) 20 MG TABS tablet Take 1 tablet (20 mg) by mouth daily (with dinner)       Allergies  Allergen Reactions     No Known Drug Allergies         Family History   Family history includes Heart Disease in his brother, brother, father, and sister; Lipid DIsorder in his sister and sister.     Social History   He reports that he quit smoking about 11 years ago. His smoking use included cigarettes. He started smoking about 22 years ago. He has a 10.00 pack-year smoking history. He has never used smokeless tobacco. He reports that he does not drink alcohol and does not use drugs.    Review of Systems   Constitutional:  No fever, weight change or fatigue.    Eyes:     No dry eyes or vision changes.   Ears/Nose/Throat/Neck:  No oral ulcers, sore throat or voice change.    Cardiovascular:   No palpitations, syncope, angina or edema.   Respiratory:    No chest pain, excessive sleepiness, shortness of breath or hemoptysis.    Gastrointestinal:   No abdominal pain, nausea, vomiting, diarrhea or heartburn.    Genitourinary:   No dysuria, hematuria, urinary retention or urinary frequency.   Musculoskeletal:  No joint swelling or arthralgias.    Dermatologic:  No skin rash or other skin changes.   Neurologic:    No focal weakness or numbness. No neuropathy.   Psychiatric:    No depression, anxiety, suicidal ideation, or paranoid ideation.   Endocrine:   No cold or heat intolerance, polydipsia, hirsutism, change in libido, or flushing.   Hematology/Lymphatic:  No bleeding or lymphadenopathy.    Allergy/Immunology:  No  rhinitis or hives.     Physical Exam   Vitals:  /84, RR 16 HR 64 SpO2 97% room air.    General:  Alert and oriented to person, place and time   Airway: Normal oropharyngeal airway and neck mobility   Lungs:  Clear bilaterally   Heart:  Regular rate and rhythm   Abdomen: Soft, NT, ND, no masses   Extremities: Warm, good capillary refill    ASA Grade: II (mild systemic disease)    Impression: Cleared for use of conscious sedation for colorectal cancer screening    Plan: Proceed with colonoscopy     Anne Lucero MD  [facility]  [phone]

## 2023-03-27 LAB
PATH REPORT.COMMENTS IMP SPEC: NORMAL
PATH REPORT.COMMENTS IMP SPEC: NORMAL
PATH REPORT.FINAL DX SPEC: NORMAL
PATH REPORT.GROSS SPEC: NORMAL
PATH REPORT.MICROSCOPIC SPEC OTHER STN: NORMAL
PATH REPORT.RELEVANT HX SPEC: NORMAL
PHOTO IMAGE: NORMAL

## 2023-03-27 PROCEDURE — 88305 TISSUE EXAM BY PATHOLOGIST: CPT | Mod: 26 | Performed by: PATHOLOGY

## 2023-04-05 ENCOUNTER — TELEPHONE (OUTPATIENT)
Dept: INTERNAL MEDICINE | Facility: CLINIC | Age: 73
End: 2023-04-05
Payer: COMMERCIAL

## 2023-04-05 DIAGNOSIS — I48.19 PERSISTENT ATRIAL FIBRILLATION (H): ICD-10-CM

## 2023-04-05 NOTE — TELEPHONE ENCOUNTER
Received [employer] message from the patient through CRM Request...    Topic: Non-Medical Question.     [employer] [facility] has requested I contact my doctor and have a prescription filled .     Xarelto 90 day supply with 3 refills and have it faxed to   [phone]     The recipient is not in the [employer] system and the prescription must be faxed to that number  or  You can give me a paper script     Please let me know if you have any questions.     Brayden Schultz  [phone]  [email or web address]      Medication pended per request. Will route to the PCP for approval. Script will need to be printed and faxed to the number listed above.     Radha Dukes RN

## 2023-04-06 NOTE — TELEPHONE ENCOUNTER
I gave him 15 days worth to start. Do not understand the what.message says below   Signed script faxed to [employer] [facility] at [phone].

## 2023-04-19 NOTE — PROGRESS NOTES
"[facility]    I had the pleasure of seeing Brayden when he came for follow up of AFib.  This 72 year old sees Dr. Day for his history of:    1. H/o persistent AFib. EF 50-55% 1/2022 - first dx'd in 2012. Failed flecainide (and noted scar on MRI). Underwent PVI and SVC isolation 11/2017. Repeat PVI and CTI for inducible AFlutter 4/2019. Started on Amio with DCCV 11/19/2020 for recurrent AFib in setting of increased alcohol use.   Amiodarone decreased 1/2021; back in persistent AFib 12/2021 and amiodarone stopped as felt asx'c.  This was restarted 8/2022, now s/p DCCV 8/22/2022. Amiodarone stopped again 1/2023  2. Moderate Aortic Stenosis - echo 10/2022 with mean gradient 18 mmHg  3. Benign Essential HTN   4. BOWEN   5. Myocardiac Scar noted on cardiac MRI 10/2017, consistent with prior MI in the left Cx territory. EF 51%.  Avoiding flecainide use  6. H/o Alcohol dependence - no EtOH since 2017, then again 3-4 months in early 2022 after he was noted to be back in AFib. Now no EtOH since 8/2022  7. Chronic AC  On Xarelto for CHADSVASc 2 (HTN, age)  8. Bell's Palsy    I saw Brayden 1/2023 at which time he told me he did not want to be on amiodarone any longer, due to concerns for intermittent vision changes and asymptomatic bradycardia noted with sleep.  We had already reduced the dose to 100 mg daily 10/2022 due to concerns regarding weight gain, and I reviewed that in the past, he had had recurrent atrial fibrillation after stopping amiodarone.  We ended up stopping this, and I recommended 3-month follow-up with EKG.  His BP was quite elevated due to problems parking and increased cold medications/NSAID use.    Interval History:  Brayden feels pretty good!  No recurrent AFib whatsoever that he's aware of. No palpitations, dizziness. Bell's Palsy is \"~95% better.\"      No edema, orthopnea, PND. He takes Lasix 20 mg daily just PRN depending on his schedule.    BPs at home ([facility]) " "140-150s/80-90s.    VITALS:  Vitals: BP (!) 150/100   Pulse 58   Ht 1.74 m (5' 8.5\")   Wt 127 kg (280 lb)   BMI 41.95 kg/m      Diagnostic Testing:  EKG today, which I overread, showed SB 58 with 1st degree AVB and NSSTTW changes  EKG 1/2023 , showed SB 55 bpm with 1st degree AVB. QTc ~480 ms on amiodarone 100 mg  Echo 10/18/2022 with low-normal LVEF 50-55%. Nl RV. MIld-mod AoS with mean gradient 18 mmHg, Vmax 2.9 m/s, MARILYN 1.3 cm2, DI 0.36. Mildly dilated asc ao 4.3 com  Echo 1/10/25885 showed EF 50-55% LVSF mildly reduced. No RWMA. Nl RV. LA mildly dilated 4.1 cm with volume index 35.7ml/m2. Nl MV. Trace TR. Trace AI. MARILYN 1.4 cm2 with mean gradient 12.7mmHg c/w mild-mod AoS. Asc aorta mildly dilated 4.1 cm  Holter Monitor 12/29/2021 on no AVn blocking agents showed AFib with avg HR 81 bpm (range 44--143 bpm). 2% PVC burden  EKG 7/2021 showed sinus bradycardia 45 bpm.  Nonspecific ST-T wave changes  Echocardiogram 3/2019 showed EF of 55-60%.  No regional wall motion abnormalities were noted.  He had 1+ mitral regurgitation and 1+ tricuspid regurgitation.  RV was normal in structure, function and size.  Aortic valve showed just trace to mild aortic regurgitation with aortic sclerosis.  Mean gradient was 4.6 mmHg (improved from previous) volume index is 46.7 mL/m  with LA dimension of 4.9 cm.  Cardiac MRI 10/2017 showed an EF of 51% with mild hypokinesis involving the basal     Component      Latest Ref Rng & Units 12/17/2022   Sodium      133 - 144 mmol/L 140   Potassium      3.4 - 5.3 mmol/L 4.6   Chloride      94 - 109 mmol/L 106   Carbon Dioxide      20 - 32 mmol/L 30   Anion Gap      3 - 14 mmol/L 4   Urea Nitrogen      7 - 30 mg/dL 15   Creatinine      0.66 - 1.25 mg/dL 1.10   Calcium      8.5 - 10.1 mg/dL 8.8   Glucose      70 - 99 mg/dL 105 (H)     Plan:  1. Stop Lasix 20 mg daily  2. Start hydrochlorothiazide 25 mg daily  3. BMP in ~1-2 weeks    Assessment/Plan:    1. H/o persistent AFib    Felt he was " having increasing DELEON/SOB from recurrent AFib, and underwent DCCV 8/2022.  Pretreated with amiodarone, decreased to 100 mg daily 10/2022 given concern for weight gain and abnormal TSH.    Completely stopped amiodarone per his wishes 1/2023. No recurrent AFib      EKG today shows SB 58 bpm    PLAN:    Continue monitoring for AFib recurrence    2.  HTN    Remains on losartan 100 and Lasix 20    With weight gain, BP much too high    PLAN:    Hydrochlorothiazide 25 mg daily    BMP in     4. Aortic Stenosis    Mild-moderate on echo 10/2022    PLAN:    Plan echo ~10/2023 (not yet ordered)    Crystal Campos PA-C, MSPAS      Orders Placed This Encounter   Procedures     Basic metabolic panel     Orders Placed This Encounter   Medications     hydrochlorothiazide (HYDRODIURIL) 25 MG tablet     Sig: Take 1 tablet (25 mg) by mouth daily     Dispense:  90 tablet     Refill:  3     Replaces Lasix     Medications Discontinued During This Encounter   Medication Reason     polyethylene glycol (GOLYTELY) 236 g suspension Therapy completed (No AVS)     bisacodyl (DULCOLAX) 5 MG EC tablet Therapy completed (No AVS)         Encounter Diagnoses   Name Primary?     Persistent atrial fibrillation (H)      Benign essential hypertension Yes       CURRENT MEDICATIONS:  Current Outpatient Medications   Medication Sig Dispense Refill     allopurinol (ZYLOPRIM) 300 MG tablet Take 1 tablet (300 mg) by mouth daily.  **pt due for appointment--call [phone]** 90 tablet 0     cetirizine (ZYRTEC) 10 MG tablet Take 10 mg by mouth as needed for allergies       finasteride (PROPECIA) 1 MG tablet TAKE ONE TABLET BY MOUTH ONE TIME DAILY 90 tablet 0     furosemide (LASIX) 20 MG tablet Take 1 tablet (20 mg) by mouth daily       hydrochlorothiazide (HYDRODIURIL) 25 MG tablet Take 1 tablet (25 mg) by mouth daily 90 tablet 3     losartan (COZAAR) 100 MG tablet Take 1 tablet (100 mg) by mouth daily. Need annual visit 90 tablet 0     methocarbamol (ROBAXIN) 750  "MG tablet Take 1 tablet (750 mg) by mouth 4 times daily as needed 30 tablet 0     potassium chloride ER (KLOR-CON M) 20 MEQ CR tablet Take 1 tablet (20 mEq) by mouth 2 times daily 180 tablet 0     rivaroxaban ANTICOAGULANT (XARELTO ANTICOAGULANT) 20 MG TABS tablet Take 1 tablet (20 mg) by mouth daily (with dinner) 90 tablet 3       ALLERGIES     Allergies   Allergen Reactions     No Known Drug Allergies          Review of Systems:  Skin:        Eyes:       ENT:       Respiratory:       Cardiovascular:       Gastroenterology:      Genitourinary:       Musculoskeletal:       Neurologic:       Psychiatric:       Heme/Lymph/Imm:       Endocrine:         Physical Exam:  Vitals: BP (!) 150/100   Pulse 58   Ht 1.74 m (5' 8.5\")   Wt 127 kg (280 lb)   BMI 41.95 kg/m      Constitutional:  cooperative, alert and oriented, well developed, well nourished, in no acute distress        Skin:  warm and dry to the touch, no apparent skin lesions or masses noted        Head:  normocephalic, no masses or lesions        Eyes:  pupils equal and round;conjunctivae and lids unremarkable;sclera white        ENT:      Slight R mouth droopiness - [place] Palsy    Neck:  not assessed this visit        Chest:  not assessed this visit        Cardiac:   bradycardic       systolic ejection murmur        Abdomen:  not assessed this visit        Vascular: not assessed this visit                                      Extremities and Back:  not assessed this visit        Neurological:  affect appropriate   Bell's Palsy - R facial droop (mild 4/20230        PAST MEDICAL HISTORY:  Past Medical History:   Diagnosis Date     Cancer (H)     basal nose     H/O ETOH abuse      Herpes simplex without mention of complication      Hyperlipidemia      Obesity      Persistent atrial fibrillation (H)     persistent, DCCV 12/2017, ablation 11/6/17     Sleep apnea     CPAP     Tobacco use disorder     dced 6/2004       PAST SURGICAL HISTORY:  Past Surgical History: "   Procedure Laterality Date     ABLATION OF DYSRHYTHMIC FOCUS  2018, 04/12/2019    Procedure: EP Ablation Focal AFIB;  Surgeon: Fady Day MD;  Location:  HEART CARDIAC CATH LAB     ACHILLES TENDON SURGERY  1997     ANESTHESIA CARDIOVERSION N/A 2/26/2019    Procedure: ANESTHESIA CARDIOVERSION (CONNER);  Surgeon: GENERIC ANESTHESIA PROVIDER;  Location:  OR     ANESTHESIA CARDIOVERSION N/A 11/19/2020    Procedure: ANESTHESIA, FOR CARDIOVERSION (dr campbell);  Surgeon: GENERIC ANESTHESIA PROVIDER;  Location:  OR     ANESTHESIA CARDIOVERSION N/A 8/22/2022    Procedure: CARDIOVERSION;  Surgeon: GENERIC ANESTHESIA PROVIDER;  Location:  OR     BASAL CELL CARCINOMA EXCISION Right 2009    nose, took cartilage from inner ear.     CARDIOVERSION  2012, 2019    cardioversion for atr fib     COLONOSCOPY  2008     COLONOSCOPY N/A 3/22/2023    Procedure: COLONOSCOPY, FLEXIBLE, WITH LESION REMOVAL USING SNARE;  Surgeon: Anne Lucero MD;  Location:  GI     EP ABLATION FOCAL AFIB N/A 4/12/2019    Procedure: EP Ablation Focal AFIB;  Surgeon: Fady Day MD;  Location:  HEART CARDIAC CATH LAB     HERNIA REPAIR, UMBILICAL  08/20/2012    Procedure: HERNIORRHAPHY UMBILICAL;  UMBILICAL HERNIA REPAIR;  Surgeon: Ra Crocker MD;  Location: [facility]     HERNIORRHAPHY UMBILICAL  8/20/2012    Procedure: HERNIORRHAPHY UMBILICAL;  UMBILICAL HERNIA REPAIR;  Surgeon: Ra Crocker MD;  Location: [facility]     ORTHOPEDIC SURGERY       ORTHOPEDIC SURGERY       OTHER SURGICAL HISTORY Right 03/2017    total KNEE ARTHROSCOPY      TOTAL KNEE ARTHROPLASTY Left 03/16/2018     ZZC NONSPECIFIC PROCEDURE  1997    achilles tendon     ZZC NONSPECIFIC PROCEDURE      knees, arthroscopy     ZZC NONSPECIFIC PROCEDURE      basal cell skin ca, nose     ZZC NONSPECIFIC PROCEDURE      flex sig; colonoscopy due 3/2008     ZZC NONSPECIFIC PROCEDURE  2012    cardioversion for atr fib     ZZC TOTAL KNEE ARTHROPLASTY Left 03/16/2018    Dr. Teresa  Erick       FAMILY HISTORY:  Family History   Problem Relation Age of Onset     Family History Negative Mother      Heart Disease Father         decesased at 42 - MI     Heart Disease Sister          MI     Lipids Sister      Lipids Sister      No Known Problems Sister      Heart Disease Brother          MI     Heart Disease Brother         CABG AT 49       SOCIAL HISTORY:  Social History     Socioeconomic History     Marital status: Single     Spouse name: None     Number of children: None     Years of education: None     Highest education level: None   Tobacco Use     Smoking status: Former     Packs/day: 1.00     Years: 10.00     Pack years: 10.00     Types: Cigarettes     Start date:      Quit date: 2011     Years since quittin.6     Smokeless tobacco: Never   Vaping Use     Vaping status: Never Used   Substance and Sexual Activity     Alcohol use: No     Drug use: No     Sexual activity: Yes     Partners: Female

## 2023-04-20 ENCOUNTER — OFFICE VISIT (OUTPATIENT)
Dept: CARDIOLOGY | Facility: CLINIC | Age: 73
End: 2023-04-20
Attending: PHYSICIAN ASSISTANT
Payer: COMMERCIAL

## 2023-04-20 ENCOUNTER — MYC MEDICAL ADVICE (OUTPATIENT)
Dept: CARDIOLOGY | Facility: CLINIC | Age: 73
End: 2023-04-20

## 2023-04-20 VITALS
HEART RATE: 58 BPM | BODY MASS INDEX: 41.47 KG/M2 | HEIGHT: 69 IN | SYSTOLIC BLOOD PRESSURE: 150 MMHG | WEIGHT: 280 LBS | DIASTOLIC BLOOD PRESSURE: 100 MMHG

## 2023-04-20 DIAGNOSIS — I10 BENIGN ESSENTIAL HYPERTENSION: Primary | ICD-10-CM

## 2023-04-20 DIAGNOSIS — I48.19 PERSISTENT ATRIAL FIBRILLATION (H): ICD-10-CM

## 2023-04-20 PROCEDURE — 99214 OFFICE O/P EST MOD 30 MIN: CPT | Performed by: PHYSICIAN ASSISTANT

## 2023-04-20 PROCEDURE — 93010 ELECTROCARDIOGRAM REPORT: CPT | Performed by: PHYSICIAN ASSISTANT

## 2023-04-20 RX ORDER — HYDROCHLOROTHIAZIDE 25 MG/1
25 TABLET ORAL DAILY
Qty: 90 TABLET | Refills: 3 | Status: SHIPPED | OUTPATIENT
Start: 2023-04-20 | End: 2023-09-08

## 2023-04-20 NOTE — LETTER
4/20/2023    Loco Cummins MD  [address] [place] [id number]    RE: Mike Schultz       Dear Colleague,     I had the pleasure of seeing Mike Schultz in the [facility].  [facility]    I had the pleasure of seeing Brayden when he came for follow up of AFib.  This 72 year old sees Dr. Day for his history of:    1. H/o persistent AFib. EF 50-55% 1/2022 - first dx'd in 2012. Failed flecainide (and noted scar on MRI). Underwent PVI and SVC isolation 11/2017. Repeat PVI and CTI for inducible AFlutter 4/2019. Started on Amio with DCCV 11/19/2020 for recurrent AFib in setting of increased alcohol use.   Amiodarone decreased 1/2021; back in persistent AFib 12/2021 and amiodarone stopped as felt asx'c.  This was restarted 8/2022, now s/p DCCV 8/22/2022. Amiodarone stopped again 1/2023  2. Moderate Aortic Stenosis - echo 10/2022 with mean gradient 18 mmHg  3. Benign Essential HTN   4. BOWEN   5. Myocardiac Scar noted on cardiac MRI 10/2017, consistent with prior MI in the left Cx territory. EF 51%.  Avoiding flecainide use  6. H/o Alcohol dependence - no EtOH since 2017, then again 3-4 months in early 2022 after he was noted to be back in AFib. Now no EtOH since 8/2022  7. Chronic AC  On Xarelto for CHADSVASc 2 (HTN, age)  8. Bell's Palsy    I saw Brayden 1/2023 at which time he told me he did not want to be on amiodarone any longer, due to concerns for intermittent vision changes and asymptomatic bradycardia noted with sleep.  We had already reduced the dose to 100 mg daily 10/2022 due to concerns regarding weight gain, and I reviewed that in the past, he had had recurrent atrial fibrillation after stopping amiodarone.  We ended up stopping this, and I recommended 3-month follow-up with EKG.  His BP was quite elevated due to problems parking and increased cold medications/NSAID use.    Interval History:  Brayden feels pretty good!  No recurrent AFib whatsoever that he's  "aware of. No palpitations, dizziness. Bell's Palsy is \"~95% better.\"      No edema, orthopnea, PND. He takes Lasix 20 mg daily just PRN depending on his schedule.    BPs at home ([facility]) 140-150s/80-90s.    VITALS:  Vitals: BP (!) 150/100   Pulse 58   Ht 1.74 m (5' 8.5\")   Wt 127 kg (280 lb)   BMI 41.95 kg/m      Diagnostic Testing:  EKG today, which I overread, showed SB 58 with 1st degree AVB and NSSTTW changes  EKG 1/2023 , showed SB 55 bpm with 1st degree AVB. QTc ~480 ms on amiodarone 100 mg  Echo 10/18/2022 with low-normal LVEF 50-55%. Nl RV. MIld-mod AoS with mean gradient 18 mmHg, Vmax 2.9 m/s, MARILYN 1.3 cm2, DI 0.36. Mildly dilated asc ao 4.3 com  Echo 1/10/66819 showed EF 50-55% LVSF mildly reduced. No RWMA. Nl RV. LA mildly dilated 4.1 cm with volume index 35.7ml/m2. Nl MV. Trace TR. Trace AI. MARILYN 1.4 cm2 with mean gradient 12.7mmHg c/w mild-mod AoS. Asc aorta mildly dilated 4.1 cm  Holter Monitor 12/29/2021 on no AVn blocking agents showed AFib with avg HR 81 bpm (range 44--143 bpm). 2% PVC burden  EKG 7/2021 showed sinus bradycardia 45 bpm.  Nonspecific ST-T wave changes  Echocardiogram 3/2019 showed EF of 55-60%.  No regional wall motion abnormalities were noted.  He had 1+ mitral regurgitation and 1+ tricuspid regurgitation.  RV was normal in structure, function and size.  Aortic valve showed just trace to mild aortic regurgitation with aortic sclerosis.  Mean gradient was 4.6 mmHg (improved from previous) volume index is 46.7 mL/m  with LA dimension of 4.9 cm.  Cardiac MRI 10/2017 showed an EF of 51% with mild hypokinesis involving the basal     Component      Latest Ref Rng & Units 12/17/2022   Sodium      133 - 144 mmol/L 140   Potassium      3.4 - 5.3 mmol/L 4.6   Chloride      94 - 109 mmol/L 106   Carbon Dioxide      20 - 32 mmol/L 30   Anion Gap      3 - 14 mmol/L 4   Urea Nitrogen      7 - 30 mg/dL 15   Creatinine      0.66 - 1.25 mg/dL 1.10   Calcium      8.5 - 10.1 mg/dL 8.8 "   Glucose      70 - 99 mg/dL 105 (H)     Plan:  1. Stop Lasix 20 mg daily  2. Start hydrochlorothiazide 25 mg daily  3. BMP in ~1-2 weeks    Assessment/Plan:    H/o persistent AFib  [place] he was having increasing DELEON/SOB from recurrent AFib, and underwent DCCV 8/2022.  Pretreated with amiodarone, decreased to 100 mg daily 10/2022 given concern for weight gain and abnormal TSH.  Completely stopped amiodarone per his wishes 1/2023. No recurrent AFib    EKG today shows SB 58 bpm    PLAN:  Continue monitoring for AFib recurrence    2.  HTN  Remains on losartan 100 and Lasix 20  With weight gain, BP much too high    PLAN:  Hydrochlorothiazide 25 mg daily  BMP in     4. Aortic Stenosis  Mild-moderate on echo 10/2022    PLAN:  Plan echo ~10/2023 (not yet ordered)    Crystal Campos PA-C, MSPAS      Orders Placed This Encounter   Procedures    Basic metabolic panel     Orders Placed This Encounter   Medications    hydrochlorothiazide (HYDRODIURIL) 25 MG tablet     Sig: Take 1 tablet (25 mg) by mouth daily     Dispense:  90 tablet     Refill:  3     Replaces Lasix     Medications Discontinued During This Encounter   Medication Reason    polyethylene glycol (GOLYTELY) 236 g suspension Therapy completed (No AVS)    bisacodyl (DULCOLAX) 5 MG EC tablet Therapy completed (No AVS)         Encounter Diagnoses   Name Primary?    Persistent atrial fibrillation (H)     Benign essential hypertension Yes       CURRENT MEDICATIONS:  Current Outpatient Medications   Medication Sig Dispense Refill    allopurinol (ZYLOPRIM) 300 MG tablet Take 1 tablet (300 mg) by mouth daily.  **pt due for appointment--call [phone]** 90 tablet 0    cetirizine (ZYRTEC) 10 MG tablet Take 10 mg by mouth as needed for allergies      finasteride (PROPECIA) 1 MG tablet TAKE ONE TABLET BY MOUTH ONE TIME DAILY 90 tablet 0    furosemide (LASIX) 20 MG tablet Take 1 tablet (20 mg) by mouth daily      hydrochlorothiazide (HYDRODIURIL) 25 MG tablet Take 1 tablet (25 mg)  "by mouth daily 90 tablet 3    losartan (COZAAR) 100 MG tablet Take 1 tablet (100 mg) by mouth daily. Need annual visit 90 tablet 0    methocarbamol (ROBAXIN) 750 MG tablet Take 1 tablet (750 mg) by mouth 4 times daily as needed 30 tablet 0    potassium chloride ER (KLOR-CON M) 20 MEQ CR tablet Take 1 tablet (20 mEq) by mouth 2 times daily 180 tablet 0    rivaroxaban ANTICOAGULANT (XARELTO ANTICOAGULANT) 20 MG TABS tablet Take 1 tablet (20 mg) by mouth daily (with dinner) 90 tablet 3       ALLERGIES     Allergies   Allergen Reactions    No Known Drug Allergies          Review of Systems:  Skin:        Eyes:       ENT:       Respiratory:       Cardiovascular:       Gastroenterology:      Genitourinary:       Musculoskeletal:       Neurologic:       Psychiatric:       Heme/Lymph/Imm:       Endocrine:         Physical Exam:  Vitals: BP (!) 150/100   Pulse 58   Ht 1.74 m (5' 8.5\")   Wt 127 kg (280 lb)   BMI 41.95 kg/m      Constitutional:  cooperative, alert and oriented, well developed, well nourished, in no acute distress        Skin:  warm and dry to the touch, no apparent skin lesions or masses noted        Head:  normocephalic, no masses or lesions        Eyes:  pupils equal and round;conjunctivae and lids unremarkable;sclera white        ENT:      Slight R mouth droopiness - [place] Palsy    Neck:  not assessed this visit        Chest:  not assessed this visit        Cardiac:   bradycardic       systolic ejection murmur        Abdomen:  not assessed this visit        Vascular: not assessed this visit                                      Extremities and Back:  not assessed this visit        Neurological:  affect appropriate   Bell's Palsy - R facial droop (mild 4/20230       PAST MEDICAL HISTORY:  Past Medical History:   Diagnosis Date    Cancer (H)     basal nose    H/O ETOH abuse     Herpes simplex without mention of complication     Hyperlipidemia     Obesity     Persistent atrial fibrillation (H)     " persistent, DCCV 12/2017, ablation 11/6/17    Sleep apnea     CPAP    Tobacco use disorder     dced 6/2004       PAST SURGICAL HISTORY:  Past Surgical History:   Procedure Laterality Date    ABLATION OF DYSRHYTHMIC FOCUS  2018, 04/12/2019    Procedure: EP Ablation Focal AFIB;  Surgeon: Fady Day MD;  Location:  HEART CARDIAC CATH LAB    ACHILLES TENDON SURGERY  1997    ANESTHESIA CARDIOVERSION N/A 2/26/2019    Procedure: ANESTHESIA CARDIOVERSION (CONNER);  Surgeon: GENERIC ANESTHESIA PROVIDER;  Location:  OR    ANESTHESIA CARDIOVERSION N/A 11/19/2020    Procedure: ANESTHESIA, FOR CARDIOVERSION (dr campbell);  Surgeon: GENERIC ANESTHESIA PROVIDER;  Location:  OR    ANESTHESIA CARDIOVERSION N/A 8/22/2022    Procedure: CARDIOVERSION;  Surgeon: GENERIC ANESTHESIA PROVIDER;  Location:  OR    BASAL CELL CARCINOMA EXCISION Right 2009    nose, took cartilage from inner ear.    CARDIOVERSION  2012, 2019    cardioversion for atr fib    COLONOSCOPY  2008    COLONOSCOPY N/A 3/22/2023    Procedure: COLONOSCOPY, FLEXIBLE, WITH LESION REMOVAL USING SNARE;  Surgeon: Anne Lucero MD;  Location:  GI    EP ABLATION FOCAL AFIB N/A 4/12/2019    Procedure: EP Ablation Focal AFIB;  Surgeon: Fady Day MD;  Location:  HEART CARDIAC CATH LAB    HERNIA REPAIR, UMBILICAL  08/20/2012    Procedure: HERNIORRHAPHY UMBILICAL;  UMBILICAL HERNIA REPAIR;  Surgeon: Ra Crocker MD;  Location: [facility]    HERNIORRHAPHY UMBILICAL  8/20/2012    Procedure: HERNIORRHAPHY UMBILICAL;  UMBILICAL HERNIA REPAIR;  Surgeon: Ra Crocker MD;  Location: [facility]    ORTHOPEDIC SURGERY      ORTHOPEDIC SURGERY      OTHER SURGICAL HISTORY Right 03/2017    total KNEE ARTHROSCOPY     TOTAL KNEE ARTHROPLASTY Left 03/16/2018    ZZC NONSPECIFIC PROCEDURE  1997    achilles tendon    ZZC NONSPECIFIC PROCEDURE      knees, arthroscopy    ZZC NONSPECIFIC PROCEDURE      basal cell skin ca, nose    ZZC NONSPECIFIC PROCEDURE      flex sig;  colonoscopy due 3/2008    [facility] NONSPECIFIC PROCEDURE      cardioversion for atr fib    ZZC TOTAL KNEE ARTHROPLASTY Left 2018    Dr. Malick Suarez       FAMILY HISTORY:  Family History   Problem Relation Age of Onset    Family History Negative Mother     Heart Disease Father         decesased at 42 - MI    Heart Disease Sister          MI    Lipids Sister     Lipids Sister     No Known Problems Sister     Heart Disease Brother          MI    Heart Disease Brother         CABG AT 49       SOCIAL HISTORY:  Social History     Socioeconomic History    Marital status: Single     Spouse name: None    Number of children: None    Years of education: None    Highest education level: None   Tobacco Use    Smoking status: Former     Packs/day: 1.00     Years: 10.00     Pack years: 10.00     Types: Cigarettes     Start date:      Quit date: 2011     Years since quittin.6    Smokeless tobacco: Never   Vaping Use    Vaping status: Never Used   Substance and Sexual Activity    Alcohol use: No    Drug use: No    Sexual activity: Yes     Partners: Female                Thank you for allowing me to participate in the care of your patient.      Sincerely,     Natalie Campos PA-C     [facility]  cc:   Natalie Campos PA-C  [address] [facility] [address] [place] [id number]

## 2023-04-20 NOTE — PATIENT INSTRUCTIONS
Brayden - it was good to see you today!    EKG today showed normal rhythm off of amiodarone since 1/2023  BP too high with the weight gain    PLAN  Stop furosemide 20 mg daily  START hydrochlorothiazide 25 mg daily  Get non-fasting blood 1-2 weeks to make sure kidneys/electrolytes are OK  When call about blood work, will find out what BPs are running    [phone]

## 2023-04-26 DIAGNOSIS — I10 ESSENTIAL HYPERTENSION: ICD-10-CM

## 2023-04-26 RX ORDER — LOSARTAN POTASSIUM 100 MG/1
TABLET ORAL
Qty: 90 TABLET | Refills: 0 | Status: SHIPPED | OUTPATIENT
Start: 2023-04-26 | End: 2023-07-17

## 2023-05-03 ENCOUNTER — MYC MEDICAL ADVICE (OUTPATIENT)
Dept: OTOLARYNGOLOGY | Facility: CLINIC | Age: 73
End: 2023-05-03
Payer: COMMERCIAL

## 2023-05-03 DIAGNOSIS — R42 DIZZINESS: Primary | ICD-10-CM

## 2023-05-14 DIAGNOSIS — L65.9 ALOPECIA: ICD-10-CM

## 2023-05-15 ENCOUNTER — LAB (OUTPATIENT)
Dept: LAB | Facility: CLINIC | Age: 73
End: 2023-05-15
Payer: COMMERCIAL

## 2023-05-15 DIAGNOSIS — I10 BENIGN ESSENTIAL HYPERTENSION: ICD-10-CM

## 2023-05-15 PROCEDURE — 36415 COLL VENOUS BLD VENIPUNCTURE: CPT

## 2023-05-15 PROCEDURE — 80048 BASIC METABOLIC PNL TOTAL CA: CPT

## 2023-05-15 RX ORDER — FINASTERIDE 1 MG/1
TABLET, FILM COATED ORAL
Qty: 90 TABLET | Refills: 0 | Status: SHIPPED | OUTPATIENT
Start: 2023-05-15 | End: 2023-07-26

## 2023-05-16 ENCOUNTER — DOCUMENTATION ONLY (OUTPATIENT)
Dept: CARDIOLOGY | Facility: CLINIC | Age: 73
End: 2023-05-16
Payer: COMMERCIAL

## 2023-05-16 DIAGNOSIS — I48.19 PERSISTENT ATRIAL FIBRILLATION (H): ICD-10-CM

## 2023-05-16 DIAGNOSIS — I35.0 NONRHEUMATIC AORTIC VALVE STENOSIS: Primary | ICD-10-CM

## 2023-05-16 LAB
ANION GAP SERPL CALCULATED.3IONS-SCNC: 11 MMOL/L (ref 7–15)
BUN SERPL-MCNC: 15.4 MG/DL (ref 8–23)
CALCIUM SERPL-MCNC: 9.4 MG/DL (ref 8.8–10.2)
CHLORIDE SERPL-SCNC: 98 MMOL/L (ref 98–107)
CREAT SERPL-MCNC: 1 MG/DL (ref 0.67–1.17)
DEPRECATED HCO3 PLAS-SCNC: 29 MMOL/L (ref 22–29)
GFR SERPL CREATININE-BSD FRML MDRD: 80 ML/MIN/1.73M2
GLUCOSE SERPL-MCNC: 81 MG/DL (ref 70–99)
POTASSIUM SERPL-SCNC: 3.8 MMOL/L (ref 3.4–5.3)
SODIUM SERPL-SCNC: 138 MMOL/L (ref 136–145)

## 2023-05-16 NOTE — PROGRESS NOTES
Spoke to pt and made him aware that Crystal said the his BMP looked good. Pt has been taking the lasix on Mon, wed and Friday along with his hydrochlorothiazide. Pt states that he has no LE edema at this time and we discussed that pt was to stop the lasix. Pt would like to ease off the lasix down to being off completely. Pt was ok with this. Pt had BP for 5//78 and HR 68 and 5//80 HR 60. Pt weight is down 2 pounds. Pt states that he is watching what he eats and continues exercise/walk. Pt did state that he is not eating breakfast, salad for lunch and a small supper.  Pt wondering when he is to follow up. Explained that will find this out from Crystal and let him know. Pt states understanding. Miguel Ángel

## 2023-05-16 NOTE — PROGRESS NOTES
Pls let Brayden know I reviewed BMP done after stopping Lasix 20 (except an occasional one if needed for swelling) and starting hydrochlorothiazide 25 mg daily back on 4/20    1. This looked good!  2. How often taking the Lasix PRN for edema?  3. How are BPs on the hydrochlorothiazide 25?   4. Still doing well with diet/exercise?    Thx Crystal    Component      Latest Ref Rng 5/15/2023  4:55 PM   Sodium      136 - 145 mmol/L 138    Potassium      3.4 - 5.3 mmol/L 3.8    Chloride      98 - 107 mmol/L 98    Carbon Dioxide (CO2)      22 - 29 mmol/L 29    Anion Gap      7 - 15 mmol/L 11    Urea Nitrogen      8.0 - 23.0 mg/dL 15.4    Creatinine      0.67 - 1.17 mg/dL 1.00    Calcium      8.8 - 10.2 mg/dL 9.4    Glucose      70 - 99 mg/dL 81    GFR Estimate      >60 mL/min/1.73m2 80          =

## 2023-05-17 RX ORDER — FUROSEMIDE 20 MG
TABLET ORAL
Qty: 12 TABLET | Refills: 3 | Status: SHIPPED | OUTPATIENT
Start: 2023-05-17 | End: 2023-09-08

## 2023-05-17 NOTE — PROGRESS NOTES
Spoke to patient to review recommendations per TOBI Rivas:  Agree with backing off on the furosemide, aiming to just take once/week at most.  Update medication list.    Patient reports he will reduce to once weekly and will take on Monday.  Medication list updated in epic and pharmacy also updated.     See me back in 3-6 months, which ever he is most comfortable with.  He will need an updated echo for AoS noted on echo 10/2022, and we will repeat lab work as well.  I have ordered all of these.   Patient wants to follow up in three months.  Sent message to scheduling to get this set up.  PRABHA Palomo

## 2023-05-17 NOTE — PROGRESS NOTES
Agree with backing off on the furosemide, aiming to just take once/week at most.  Update medication list.    See me back in 3-6 months, which ever he is most comfortable with.  He will need an updated echo for AoS noted on echo 10/2022, and we will repeat lab work as well.  I have ordered all of these.    Thanks-Crystal

## 2023-06-01 DIAGNOSIS — I10 BENIGN ESSENTIAL HYPERTENSION: ICD-10-CM

## 2023-06-01 DIAGNOSIS — E87.6 HYPOKALEMIA: ICD-10-CM

## 2023-06-01 DIAGNOSIS — M10.9 GOUT OF FOOT, UNSPECIFIED CAUSE, UNSPECIFIED CHRONICITY, UNSPECIFIED LATERALITY: ICD-10-CM

## 2023-06-01 RX ORDER — POTASSIUM CHLORIDE 1500 MG/1
20 TABLET, EXTENDED RELEASE ORAL 2 TIMES DAILY
Qty: 180 TABLET | Refills: 0 | Status: SHIPPED | OUTPATIENT
Start: 2023-06-01 | End: 2023-08-30

## 2023-06-02 RX ORDER — ALLOPURINOL 300 MG/1
TABLET ORAL
Qty: 90 TABLET | Refills: 0 | Status: SHIPPED | OUTPATIENT
Start: 2023-06-02 | End: 2023-07-27

## 2023-06-06 ENCOUNTER — TELEPHONE (OUTPATIENT)
Dept: INTERNAL MEDICINE | Facility: CLINIC | Age: 73
End: 2023-06-06
Payer: COMMERCIAL

## 2023-06-06 DIAGNOSIS — G51.0 BELL'S PALSY: Primary | ICD-10-CM

## 2023-06-06 NOTE — TELEPHONE ENCOUNTER
"Received CRM Request from the patient needing a referral for Neurology...    \"I need a referral for a neurologist at [facility]     They said to send the referral to the following fax #  [phone]     Once they receive the referral they will call me and schedule an appointment.\"     Upon chart review, patient has been seeing ENT for R ear blockage. Patient underwent an MRI on 2/17/23.  See message from ENT RN below...        No neurology referral has been placed by ENT. Will route to PCP for review. Neurology referral pended for review.       Radha Dukes, RN  "

## 2023-06-07 NOTE — TELEPHONE ENCOUNTER
Order faxed to number provider from below. Called and spoke with pt. Relayed the message that the referral was placed. Provided pt the scheduling # if he is not contacted in the next couple of business days. Pt agrees to plan.

## 2023-06-10 NOTE — TELEPHONE ENCOUNTER
6/10/2023-Request for images faxed to [facility]MR @ 941am    Action Kimberly Hunt on 6/12/2023 at 1:25 PM   Action Taken Imaging from [facility] resolved into PACS. -JA       RECORDS RECEIVED FROM:    REASON FOR VISIT: [place] Palsy    Date of Appt: 7/20/2023   NOTES (FOR ALL VISITS) STATUS DETAILS   OFFICE NOTE from referring provider Juan Chi-2/9/2023   OFFICE NOTE from other specialist [facility]    DISCHARGE SUMMARY from hospital     DISCHARGE REPORT from the ER     OPERATIVE REPORT     [facility] (MS ONLY)     EMG     EEG     MEDICATION LIST     IMAGING  (FOR ALL VISITS)     LUMBAR PUNCTURE     DEANGELO SCAN (MOVEMENT)     ULTRASOUND (CAROTID BILAT) *VASCULAR*     MRI (HEAD, NECK, SPINE) PACS MR Brain-2/17/2023    MRA/MRI Head-2/7/2022    MRA/MRI Brain-2/6/2022-[facility]   CT (HEAD, NECK, SPINE) PACS  CT Head-1/23/2022

## 2023-06-30 NOTE — TELEPHONE ENCOUNTER
Patient needs to be seen for appt.   Pt remains mostly withdrawn to room today, resting in bed. Pt denies SI/HI, reports having an increase in [address] stating "they are going to kill you." pt states this is causing an increase in paranoia and anxiety. Pt states he is trying to ignore them however having difficulty with this. Pt provided with radio to try and help distract from [address]. Reviewed scheduled medications with patient, pt verbalizes understanding. Encouraged pt to notify staff if radio is not helpful or AH continue to be bothersome. Pt agreeable.

## 2023-07-17 DIAGNOSIS — I10 ESSENTIAL HYPERTENSION: ICD-10-CM

## 2023-07-17 RX ORDER — LOSARTAN POTASSIUM 100 MG/1
TABLET ORAL
Qty: 90 TABLET | Refills: 0 | Status: SHIPPED | OUTPATIENT
Start: 2023-07-17 | End: 2023-07-27

## 2023-07-20 ENCOUNTER — PRE VISIT (OUTPATIENT)
Dept: NEUROLOGY | Facility: CLINIC | Age: 73
End: 2023-07-20

## 2023-07-20 ENCOUNTER — OFFICE VISIT (OUTPATIENT)
Dept: NEUROLOGY | Facility: CLINIC | Age: 73
End: 2023-07-20
Attending: INTERNAL MEDICINE
Payer: COMMERCIAL

## 2023-07-20 VITALS
HEART RATE: 53 BPM | OXYGEN SATURATION: 95 % | RESPIRATION RATE: 16 BRPM | SYSTOLIC BLOOD PRESSURE: 153 MMHG | DIASTOLIC BLOOD PRESSURE: 73 MMHG

## 2023-07-20 DIAGNOSIS — G51.0 BELL'S PALSY: ICD-10-CM

## 2023-07-20 DIAGNOSIS — H53.8 BLURRED VISION: Primary | ICD-10-CM

## 2023-07-20 PROCEDURE — 99203 OFFICE O/P NEW LOW 30 MIN: CPT | Mod: GC | Performed by: STUDENT IN AN ORGANIZED HEALTH CARE EDUCATION/TRAINING PROGRAM

## 2023-07-20 NOTE — LETTER
2023       RE: Mike Schultz  [address] [facility] [address]     Dear Colleague,    Thank you for referring your patient, Mike Schultz, to the [facility] [place] at [facility]. Please see a copy of my visit note below.      DEPARTMENT OF NEUROLOGY    Patient Name:  Mike Schultz  MRN:  [id number]    :  1950  Date of Clinic Visit:  2023  Primary Care Provider:  Loco Cummins    Assessment:  Mike Schultz is a 72-year-old gentleman who presents to Neurology this afternoon for further evaluation of symptoms developing after an episode of Bell's palsy.  Patient was diagnosed with Bell's palsy back in 2022 and was treated with prednisone and acyclovir.  He reported today about 90 to 95% improvement following treatment, but continues to experience plugging of his ear on the right with a sensation of fluttering, as well as blurring of vision in his right eye.  He has been evaluated by ENT for his ear concerns but unfortunately these evaluations have been unrevealing as to the cause of his symptoms.  I have little doubt about the accuracy of his initial diagnosis of Bell's palsy; he has not been experiencing any tinnitus, vertigo or deafness to suggest [place]-Hunt syndrome.  Similarly, no facial muscle atrophy to suggest Avi-Romberg syndrome.  It is unclear to me what is causing his ear complaints; I do wonder whether this could be an abnormal presentation of synkinesis, although this usually occurs with muscles in the face.  We discussed the potential possibility of evaluating this further with an EMG, although I am not entirely sure whether we would be able to test the muscles necessary with this test.  Similarly, we discussed the option of repeating a brain MRI with and without contrast looking more closely at the cranial nerves.  After discussion of these options, the patient  expressed his preference of waiting for now and reaching out to me in the future if his symptoms become more bothersome and he has reconsidered pursuing further testing.  He was interested in seeking ophthalmology's opinion regarding his change in vision in the right eye, which we will place today.    Plan:  - Referral to ophthalmology for evaluation of right eye blurred vision  - Consider repeat MRI brain with and without contrast, with attention to the cranial nerves, if symptoms worsen or become more bothersome  - If patient expresses interest in the future, I would be happy to reach out to our neuromuscular colleagues to see whether EMG could be an option for further evaluation      Patient has been seen with Dr. Coyle who agrees with my assessment and plan.    Doyle Montalvo MD  [facility] Department of Neurology PGY-4    This note consists of symbols derived from keyboarding, dictation and/or voice recognition software. As a result, there may be errors in the document that have gone undetected. Please consider this when interpreting information presented here within.      ------------------------------------------------------------------------------------------------------------    Chief Complaint: Bell's palsy       HPI: Mike Schultz is a 72-year-old male with a history of atrial fibrillation, HTN, HLD, BOWEN, and bilateral carpal tunnel syndrome who presents to neurology for evaluation of symptoms arising after development of right-sided Bell's palsy.  He was initially seen for this concern by Dr. Correa through the [facility] on 1/27/2022.    Mr. Schultz explains that he was diagnosed with Bell's palsy about a year ago (per chart review, this diagnosis came in January 2022).  He was treated with prednisone and acyclovir and, since that time, he reports about 90 to 95% resolution of his symptoms.  Unfortunately, however, he continues to experience issues with his right ear which  "she describes as a \"plugging up\" sensation that has been going on over the past 6 to 7 months.  When his ear plugs up, he reports that he cannot hear out of it.  He is found that going outdoors seems to be a trigger for causing his ear to plug up.  He also endorses a more regular \"groggy sensation\" in the same year.  He reports that he has been seen by two ENT doctors for this, but he still has not been able to get answers.    Patient denies any accompanying dizziness or prolonged hearing loss.  There has not been any atrophy of the facial muscles.  He has not noticed any issues with balance.  He denies any tinnitus but endorses a \"fluttering\" sensation that is difficult for him to further describe.    In addition to his concerns regarding his right ear, he also reports that the vision out of his right eye has been blurry.  He believes that this may be related to his inability to close that eye for many months.  He reports that this blurry vision started a couple of months ago.  He was seen by an optometrist and recalls being told that he does not have 20/20 vision in that eye but his exam was reassuring against cataracts.  He has not been evaluated by an ophthalmologist for this problem.  He would be interested in referral to ophthalmology today.      Vital signs:                         Estimated body mass index is 41.95 kg/m  as calculated from the following:    Height as of 4/20/23: 1.74 m (5' 8.5\").    Weight as of 4/20/23: 127 kg (280 lb).      Examination:   -General: Sitting comfortably on the chair. No acute distress.  -HEENT: No skin discolorations noted. Head is normocephalic, atraumatic. Tympanic membrane pearly gray on otologic exam on the right.   -Pulm: Normal work of breathing on RA.   -Neurological:   --MS: Patient is alert, attentive, and oriented. Speech is clear and fluid. No aphasia. Remote memory intact.   --CNs: Pupils symmetric, round and reactive to light. Visual fields are full bilaterally. " Ocular motility is full. Facial sensation intact. Subtle facial asymmetry at rest with flattening of the nasolabial fold noted upon activation. House-Brackmann grade II. He is unable to keep his right eye shut against examiner resistance. Hearing intact to conversation. Palate elevation normal. Shoulder shrug is strong and symmetric. Tongue motions normal.   --Motor: Normal muscle bulk. Muscle strength is 5/5 and symmetric with testing of the deltoids, biceps, triceps, , hip flexors, knee flexors/extensors, and ankle dorsi- and plantarflexion.  --Reflexes: 2+ and symmetric at biceps and BR. Absent patellar and Achilles reflexes. Toes are downgoing. Dodge sign is absent bilaterally.   --Sensory: Light touch, proprioception and PP intact bilaterally in upper and lower extremities. Vibratory sensation is reduced at the level of the toes with normalization by the level of the medial malleoli.   --Coordination: Finger-to-nose is normal bilaterally.   --Gait: Stands with feet normally spaced. Gait is steady. Able to walk on heels and toes without difficulty. Tandem walk is challenging for patient and he is unsteady performing this.       INVESTIGATIONS:  All available and relevant labs, imaging, and other procedures were reviewed with the patient at this visit. Those of particular significance are listed below:    MRI brain with and without contrast (2/17/2023):  IMPRESSION:    1. Normal MR appearance of the internal auditory canals, labyrinthine  structures, as well as the intracranial course of the seventh and  eighth cranial nerves.   2. No evidence of acute infarct, mass, hemorrhage, or herniation.  3. Mild nonspecific white matter changes likely due to chronic  microvascular ischemic disease.    Attestation signed by Portillo Coyle DO at 7/26/2023  1:42 PM:  I saw and evaluated the patient on 7/20/2023 and agree with the findings and the plan of care as documented in the resident's note.       Total time 30 minutes over half of which was with patient and family counseling or coordinating care          Again, thank you for allowing me to participate in the care of your patient.      Sincerely,    Lb Montalvo MD

## 2023-07-20 NOTE — PROGRESS NOTES
DEPARTMENT OF NEUROLOGY    Patient Name:  Mike Schultz  MRN:  [id number]    :  1950  Date of Clinic Visit:  2023  Primary Care Provider:  Loco Cummins    Assessment:  Mike Schultz is a 72-year-old gentleman who presents to Neurology this afternoon for further evaluation of symptoms developing after an episode of Bell's palsy.  Patient was diagnosed with Bell's palsy back in 2022 and was treated with prednisone and acyclovir.  He reported today about 90 to 95% improvement following treatment, but continues to experience plugging of his ear on the right with a sensation of fluttering, as well as blurring of vision in his right eye.  He has been evaluated by ENT for his ear concerns but unfortunately these evaluations have been unrevealing as to the cause of his symptoms.  I have little doubt about the accuracy of his initial diagnosis of Bell's palsy; he has not been experiencing any tinnitus, vertigo or deafness to suggest [place]-Hunt syndrome.  Similarly, no facial muscle atrophy to suggest Avi-Romberg syndrome.  It is unclear to me what is causing his ear complaints; I do wonder whether this could be an abnormal presentation of synkinesis, although this usually occurs with muscles in the face.  We discussed the potential possibility of evaluating this further with an EMG, although I am not entirely sure whether we would be able to test the muscles necessary with this test.  Similarly, we discussed the option of repeating a brain MRI with and without contrast looking more closely at the cranial nerves.  After discussion of these options, the patient expressed his preference of waiting for now and reaching out to me in the future if his symptoms become more bothersome and he has reconsidered pursuing further testing.  He was interested in seeking ophthalmology's opinion regarding his change in vision in the right eye, which we will place today.    Plan:  - Referral to  "ophthalmology for evaluation of right eye blurred vision  - Consider repeat MRI brain with and without contrast, with attention to the cranial nerves, if symptoms worsen or become more bothersome  - If patient expresses interest in the future, I would be happy to reach out to our neuromuscular colleagues to see whether EMG could be an option for further evaluation      Patient has been seen with Dr. Coyle who agrees with my assessment and plan.    Doyle Montalvo MD  [facility] Department of Neurology PGY-4    This note consists of symbols derived from keyboarding, dictation and/or voice recognition software. As a result, there may be errors in the document that have gone undetected. Please consider this when interpreting information presented here within.      ------------------------------------------------------------------------------------------------------------    Chief Complaint: Bell's palsy       HPI: Mike Schultz is a 72-year-old male with a history of atrial fibrillation, HTN, HLD, BOWEN, and bilateral carpal tunnel syndrome who presents to neurology for evaluation of symptoms arising after development of right-sided Bell's palsy.  He was initially seen for this concern by Dr. Correa through the [facility] system on 1/27/2022.    Mr. Schultz explains that he was diagnosed with Bell's palsy about a year ago (per chart review, this diagnosis came in January 2022).  He was treated with prednisone and acyclovir and, since that time, he reports about 90 to 95% resolution of his symptoms.  Unfortunately, however, he continues to experience issues with his right ear which she describes as a \"plugging up\" sensation that has been going on over the past 6 to 7 months.  When his ear plugs up, he reports that he cannot hear out of it.  He is found that going outdoors seems to be a trigger for causing his ear to plug up.  He also endorses a more regular \"groggy sensation\" in the same year.  He " "reports that he has been seen by two ENT doctors for this, but he still has not been able to get answers.    Patient denies any accompanying dizziness or prolonged hearing loss.  There has not been any atrophy of the facial muscles.  He has not noticed any issues with balance.  He denies any tinnitus but endorses a \"fluttering\" sensation that is difficult for him to further describe.    In addition to his concerns regarding his right ear, he also reports that the vision out of his right eye has been blurry.  He believes that this may be related to his inability to close that eye for many months.  He reports that this blurry vision started a couple of months ago.  He was seen by an optometrist and recalls being told that he does not have 20/20 vision in that eye but his exam was reassuring against cataracts.  He has not been evaluated by an ophthalmologist for this problem.  He would be interested in referral to ophthalmology today.      Vital signs:                         Estimated body mass index is 41.95 kg/m  as calculated from the following:    Height as of 4/20/23: 1.74 m (5' 8.5\").    Weight as of 4/20/23: 127 kg (280 lb).      Examination:   -General: Sitting comfortably on the chair. No acute distress.  -HEENT: No skin discolorations noted. Head is normocephalic, atraumatic. Tympanic membrane pearly gray on otologic exam on the right.   -Pulm: Normal work of breathing on RA.   -Neurological:   --MS: Patient is alert, attentive, and oriented. Speech is clear and fluid. No aphasia. Remote memory intact.   --CNs: Pupils symmetric, round and reactive to light. Visual fields are full bilaterally. Ocular motility is full. Facial sensation intact. Subtle facial asymmetry at rest with flattening of the nasolabial fold noted upon activation. House-Brackmann grade II. He is unable to keep his right eye shut against examiner resistance. Hearing intact to conversation. Palate elevation normal. Shoulder shrug is strong " and symmetric. Tongue motions normal.   --Motor: Normal muscle bulk. Muscle strength is 5/5 and symmetric with testing of the deltoids, biceps, triceps, , hip flexors, knee flexors/extensors, and ankle dorsi- and plantarflexion.  --Reflexes: 2+ and symmetric at biceps and BR. Absent patellar and Achilles reflexes. Toes are downgoing. Dodge sign is absent bilaterally.   --Sensory: Light touch, proprioception and PP intact bilaterally in upper and lower extremities. Vibratory sensation is reduced at the level of the toes with normalization by the level of the medial malleoli.   --Coordination: Finger-to-nose is normal bilaterally.   --Gait: Stands with feet normally spaced. Gait is steady. Able to walk on heels and toes without difficulty. Tandem walk is challenging for patient and he is unsteady performing this.       INVESTIGATIONS:  All available and relevant labs, imaging, and other procedures were reviewed with the patient at this visit. Those of particular significance are listed below:    MRI brain with and without contrast (2/17/2023):  IMPRESSION:    1. Normal MR appearance of the internal auditory canals, labyrinthine  structures, as well as the intracranial course of the seventh and  eighth cranial nerves.   2. No evidence of acute infarct, mass, hemorrhage, or herniation.  3. Mild nonspecific white matter changes likely due to chronic  microvascular ischemic disease.

## 2023-07-24 ASSESSMENT — ENCOUNTER SYMPTOMS
HEMATOCHEZIA: 0
WEAKNESS: 0
FREQUENCY: 1
CONSTIPATION: 0
ARTHRALGIAS: 0
ABDOMINAL PAIN: 0
HEMATURIA: 0
COUGH: 0
NAUSEA: 0
PALPITATIONS: 0
MYALGIAS: 0
HEADACHES: 0
DYSURIA: 0
EYE PAIN: 0
FEVER: 0
NERVOUS/ANXIOUS: 0
SHORTNESS OF BREATH: 0
HEARTBURN: 0
JOINT SWELLING: 0
PARESTHESIAS: 0
DIARRHEA: 0
SORE THROAT: 0
CHILLS: 0

## 2023-07-24 ASSESSMENT — ACTIVITIES OF DAILY LIVING (ADL): CURRENT_FUNCTION: NO ASSISTANCE NEEDED

## 2023-07-25 ENCOUNTER — MYC MEDICAL ADVICE (OUTPATIENT)
Dept: CARDIOLOGY | Facility: CLINIC | Age: 73
End: 2023-07-25
Payer: COMMERCIAL

## 2023-07-25 DIAGNOSIS — L65.9 ALOPECIA: ICD-10-CM

## 2023-07-25 DIAGNOSIS — I10 ESSENTIAL HYPERTENSION: ICD-10-CM

## 2023-07-25 DIAGNOSIS — I48.19 PERSISTENT ATRIAL FIBRILLATION (H): ICD-10-CM

## 2023-07-26 RX ORDER — LOSARTAN POTASSIUM 100 MG/1
TABLET ORAL
Qty: 90 TABLET | Refills: 0 | OUTPATIENT
Start: 2023-07-26

## 2023-07-26 RX ORDER — FINASTERIDE 1 MG/1
TABLET, FILM COATED ORAL
Qty: 90 TABLET | Refills: 0 | Status: SHIPPED | OUTPATIENT
Start: 2023-07-26 | End: 2023-07-27

## 2023-07-26 NOTE — TELEPHONE ENCOUNTER
Script sent to the pharmacy on 7/17/23 for 90 day supply. Patient should have refills on file.     Radha Dukes RN

## 2023-07-27 ENCOUNTER — OFFICE VISIT (OUTPATIENT)
Dept: INTERNAL MEDICINE | Facility: CLINIC | Age: 73
End: 2023-07-27
Payer: COMMERCIAL

## 2023-07-27 VITALS
HEART RATE: 77 BPM | DIASTOLIC BLOOD PRESSURE: 82 MMHG | WEIGHT: 279.2 LBS | BODY MASS INDEX: 41.83 KG/M2 | OXYGEN SATURATION: 95 % | TEMPERATURE: 98.2 F | SYSTOLIC BLOOD PRESSURE: 134 MMHG

## 2023-07-27 DIAGNOSIS — Z00.00 MEDICARE ANNUAL WELLNESS VISIT, SUBSEQUENT: Primary | ICD-10-CM

## 2023-07-27 DIAGNOSIS — I10 ESSENTIAL HYPERTENSION: ICD-10-CM

## 2023-07-27 DIAGNOSIS — L65.9 ALOPECIA: ICD-10-CM

## 2023-07-27 DIAGNOSIS — E66.01 MORBID OBESITY (H): ICD-10-CM

## 2023-07-27 DIAGNOSIS — E78.5 HYPERLIPIDEMIA LDL GOAL <100: ICD-10-CM

## 2023-07-27 DIAGNOSIS — Z87.891 ENCOUNTER FOR SCREENING FOR ABDOMINAL AORTIC ANEURYSM (AAA) IN PATIENT 50 YEARS OF AGE OR OLDER WITH HISTORY OF SMOKING: ICD-10-CM

## 2023-07-27 DIAGNOSIS — I35.0 NONRHEUMATIC AORTIC VALVE STENOSIS: ICD-10-CM

## 2023-07-27 DIAGNOSIS — I77.810 ASCENDING AORTA DILATATION (H): ICD-10-CM

## 2023-07-27 DIAGNOSIS — Z13.6 ENCOUNTER FOR SCREENING FOR ABDOMINAL AORTIC ANEURYSM (AAA) IN PATIENT 50 YEARS OF AGE OR OLDER WITH HISTORY OF SMOKING: ICD-10-CM

## 2023-07-27 DIAGNOSIS — I71.41 PARARENAL ABDOMINAL AORTIC ANEURYSM (AAA) WITHOUT RUPTURE (H): ICD-10-CM

## 2023-07-27 DIAGNOSIS — M10.9 GOUT OF FOOT, UNSPECIFIED CAUSE, UNSPECIFIED CHRONICITY, UNSPECIFIED LATERALITY: ICD-10-CM

## 2023-07-27 LAB
ANION GAP SERPL CALCULATED.3IONS-SCNC: 10 MMOL/L (ref 7–15)
BUN SERPL-MCNC: 21.1 MG/DL (ref 8–23)
CALCIUM SERPL-MCNC: 8.9 MG/DL (ref 8.8–10.2)
CHLORIDE SERPL-SCNC: 105 MMOL/L (ref 98–107)
CHOLEST SERPL-MCNC: 163 MG/DL
CREAT SERPL-MCNC: 0.97 MG/DL (ref 0.67–1.17)
DEPRECATED HCO3 PLAS-SCNC: 27 MMOL/L (ref 22–29)
GFR SERPL CREATININE-BSD FRML MDRD: 83 ML/MIN/1.73M2
GLUCOSE SERPL-MCNC: 116 MG/DL (ref 70–99)
HDLC SERPL-MCNC: 39 MG/DL
LDLC SERPL CALC-MCNC: 97 MG/DL
NONHDLC SERPL-MCNC: 124 MG/DL
POTASSIUM SERPL-SCNC: 4.1 MMOL/L (ref 3.4–5.3)
SODIUM SERPL-SCNC: 142 MMOL/L (ref 136–145)
TRIGL SERPL-MCNC: 133 MG/DL
URATE SERPL-MCNC: 5.8 MG/DL (ref 3.4–7)

## 2023-07-27 PROCEDURE — 80048 BASIC METABOLIC PNL TOTAL CA: CPT | Performed by: INTERNAL MEDICINE

## 2023-07-27 PROCEDURE — 99214 OFFICE O/P EST MOD 30 MIN: CPT | Mod: 25 | Performed by: INTERNAL MEDICINE

## 2023-07-27 PROCEDURE — 36415 COLL VENOUS BLD VENIPUNCTURE: CPT | Performed by: INTERNAL MEDICINE

## 2023-07-27 PROCEDURE — 84550 ASSAY OF BLOOD/URIC ACID: CPT | Performed by: INTERNAL MEDICINE

## 2023-07-27 PROCEDURE — 99397 PER PM REEVAL EST PAT 65+ YR: CPT | Performed by: INTERNAL MEDICINE

## 2023-07-27 PROCEDURE — 80061 LIPID PANEL: CPT | Performed by: INTERNAL MEDICINE

## 2023-07-27 RX ORDER — FINASTERIDE 1 MG/1
1 TABLET, FILM COATED ORAL DAILY
Qty: 90 TABLET | Refills: 4 | Status: SHIPPED | OUTPATIENT
Start: 2023-07-27 | End: 2024-08-12

## 2023-07-27 RX ORDER — ALLOPURINOL 300 MG/1
300 TABLET ORAL DAILY
Qty: 90 TABLET | Refills: 4 | Status: SHIPPED | OUTPATIENT
Start: 2023-07-27 | End: 2024-09-09

## 2023-07-27 RX ORDER — ALLOPURINOL 300 MG/1
300 TABLET ORAL DAILY
Qty: 90 TABLET | Refills: 4 | Status: SHIPPED | OUTPATIENT
Start: 2023-07-27 | End: 2023-07-27

## 2023-07-27 RX ORDER — LOSARTAN POTASSIUM 100 MG/1
100 TABLET ORAL DAILY
Qty: 90 TABLET | Refills: 4 | Status: SHIPPED | OUTPATIENT
Start: 2023-07-27 | End: 2023-07-27

## 2023-07-27 RX ORDER — LOSARTAN POTASSIUM 100 MG/1
100 TABLET ORAL DAILY
Qty: 90 TABLET | Refills: 4 | Status: SHIPPED | OUTPATIENT
Start: 2023-07-27 | End: 2024-03-01

## 2023-07-27 ASSESSMENT — ENCOUNTER SYMPTOMS
NAUSEA: 0
DYSURIA: 0
ABDOMINAL PAIN: 0
MYALGIAS: 0
ARTHRALGIAS: 0
HEARTBURN: 0
FEVER: 0
EYE PAIN: 0
WEAKNESS: 0
SORE THROAT: 0
HEMATURIA: 0
COUGH: 0
SHORTNESS OF BREATH: 0
PALPITATIONS: 0
HEADACHES: 0
CONSTIPATION: 0
HEMATOCHEZIA: 0
PARESTHESIAS: 0
FREQUENCY: 1
JOINT SWELLING: 0
CHILLS: 0
NERVOUS/ANXIOUS: 0
DIARRHEA: 0

## 2023-07-27 ASSESSMENT — ACTIVITIES OF DAILY LIVING (ADL): CURRENT_FUNCTION: NO ASSISTANCE NEEDED

## 2023-07-27 NOTE — PROGRESS NOTES
"SUBJECTIVE:   Brayden is a 72 year old who presents for Preventive Visit.  Are you in the first 12 months of your Medicare coverage?  No  Healthy Habits:     In general, how would you rate your overall health?  Fair    Frequency of exercise:  6-7 days/week    Duration of exercise:  30-45 minutes    Do you usually eat at least 4 servings of fruit and vegetables a day, include whole grains    & fiber and avoid regularly eating high fat or \"junk\" foods?  No    Taking medications regularly:  Yes    Medication side effects:  None    Ability to successfully perform activities of daily living:  No assistance needed    Home Safety:  No safety concerns identified    Hearing Impairment:  No hearing concerns    In the past 6 months, have you been bothered by leaking of urine?  No    In general, how would you rate your overall mental or emotional health?  Good    Additional concerns today:  Yes    Have you ever done Advance Care Planning? (For example, a Health Directive, POLST, or a discussion with a medical provider or your loved ones about your wishes): No, advance care planning information given to patient to review.  Patient plans to discuss their wishes with loved ones or provider.      Fall risk  Fallen 2 or more times in the past year?: No  Any fall with injury in the past year?: No    Cognitive Screening   1) Repeat 3 items (Leader, Season, Table)    2) Clock draw: ABNORMAL - clock wrong  3) 3 item recall: Recalls 3 objects  Results: 3 items recalled: COGNITIVE IMPAIRMENT LESS LIKELY    Mini-CogTM Copyright S Jose Carlos. Licensed by the author for use in [facility]; reprinted with permission (j luis@.[facility]). All rights reserved.      Reviewed and updated as needed this visit by clinical staff   Tobacco  Allergies  Meds  Problems  Med Hx  Surg Hx  Fam Hx        Reviewed and updated as needed this visit by Provider   Tobacco  Allergies  Meds  Problems  Med Hx  Surg Hx  Fam Hx         Social History "     Tobacco Use    Smoking status: Former     Packs/day: 1.00     Years: 10.00     Pack years: 10.00     Types: Cigarettes     Start date:      Quit date: 2011     Years since quittin.9    Smokeless tobacco: Never   Substance Use Topics    Alcohol use: No         2023    11:29 PM   Alcohol Use   Prescreen: >3 drinks/day or >7 drinks/week? No     Do you have a current opioid prescription? No  Do you use any other controlled substances or medications that are not prescribed by a provider? None    Current providers sharing in care for this patient include:   Patient Care Team:  Loco Cummins MD as PCP - General (Internal Medicine)  Natalie Campos PA-C as Assigned Heart and Vascular Provider  Tori Walls PA-C as Physician Assistant (Dermatology)  Rizwana Rincon MD as Assigned Sleep Provider  Ivan Gonzalez MD as MD (Otolaryngology)  Loco Cummins MD as Assigned PCP  Renate Najera AuD as Audiologist (Audiology)  Luis Manuel Chi MD as MD (Otolaryngology)  Luis Manuel Chi MD as Assigned Surgical Provider  Pedro Luis Romeo MD as MD (Dermatology)    The following health maintenance items are reviewed in Epic and correct as of today:  Health Maintenance   Topic Date Due    ZOSTER IMMUNIZATION (1 of 2) Never done    AORTIC ANEURYSM SCREENING (SYSTEM ASSIGNED)  Never done    COVID-19 Vaccine (5 - Moderna series) 2023    ANNUAL REVIEW OF HM ORDERS  2023    LUNG CANCER SCREENING  2023    MEDICARE ANNUAL WELLNESS VISIT  2024    FALL RISK ASSESSMENT  2024    LIPID  10/06/2026    DTAP/TDAP/TD IMMUNIZATION (2 - Td or Tdap) 10/12/2027    COLORECTAL CANCER SCREENING  2028    ADVANCE CARE PLANNING  2028    HEPATITIS C SCREENING  Completed    PHQ-2 (once per calendar year)  Completed    Pneumococcal Vaccine: 65+ Years  Completed    IPV IMMUNIZATION  Aged Out    MENINGITIS IMMUNIZATION  Aged Out     "INFLUENZA VACCINE  Discontinued     Brayden presents today for an AWV. This is the first time I have met Brayden. We also discussed his recent health history.    Review of Systems   Constitutional:  Negative for chills and fever.   HENT:  Negative for congestion, ear pain, hearing loss and sore throat.    Eyes:  Positive for visual disturbance. Negative for pain.   Respiratory:  Negative for cough and shortness of breath.    Cardiovascular:  Negative for chest pain, palpitations and peripheral edema.   Gastrointestinal:  Negative for abdominal pain, constipation, diarrhea, heartburn, hematochezia and nausea.   Genitourinary:  Positive for frequency. Negative for dysuria, genital sores, hematuria, impotence, penile discharge and urgency.   Musculoskeletal:  Negative for arthralgias, joint swelling and myalgias.   Skin:  Negative for rash.   Neurological:  Negative for weakness, headaches and paresthesias.   Psychiatric/Behavioral:  Negative for mood changes. The patient is not nervous/anxious.      OBJECTIVE:   /82   Pulse 77   Temp 98.2  F (36.8  C) (Oral)   Wt 126.6 kg (279 lb 3.2 oz)   SpO2 95%   BMI 41.83 kg/m   Estimated body mass index is 41.83 kg/m  as calculated from the following:    Height as of 4/20/23: 1.74 m (5' 8.5\").    Weight as of this encounter: 126.6 kg (279 lb 3.2 oz).    Physical Exam  GENERAL: alert and in no distress.  EYES: conjunctivae/corneas clear. EOMs grossly intact  HENT: Facies symmetric.  RESP: CTAB, no w/r/r.  CV: RRR, no m/r/g.  GI: NT, ND, without rebound tenderness or guarding  MSK: No edema. Moves all four extremities freely.  SKIN: No significant ulcers, lesions, or rashes on the visualized portions of the skin  NEURO: CN II-XII grossly intact.    Diagnostic Test Results: Labs reviewed in Epic    ASSESSMENT / PLAN:   Medicare annual wellness visit, subsequent  Reviewed PMH. Discussed healthcare maintenance issues, including cancer screenings, relevant immunizations " "(encouraged patient obtain Shingrix through a pharmacy), and cardiac risk factor screenings such as for cholesterol, HTN, and DM.    Essential hypertension  Continue current medications.  - losartan (COZAAR) 100 MG tablet; Take 1 tablet (100 mg) by mouth daily    Gout of foot  Labs today. Refilled chronic medication at current dose.  - Uric acid; Future  - allopurinol (ZYLOPRIM) 300 MG tablet; Take 1 tablet (300 mg) by mouth daily    Alopecia  Refilled chronic medication at current dose.  - finasteride (PROPECIA) 1 MG tablet; Take 1 tablet (1 mg) by mouth daily    Hyperlipidemia LDL goal <100  Labs today. Refilled chronic medication at current dose.  - Lipid panel reflex to direct LDL Fasting; Future    Encounter for screening for abdominal aortic aneurysm (AAA) in patient 50 years of age or older with history of smoking  Discussed anatomy and pathophysiology of this condition. He was agreeable to screening. Order placed.  - US Aorta Medicare AAA Screening; Future    Ascending aorta dilatation (H)  Noted on recent TTE. He is following with cardiology for this issue.    Morbid obesity (H)  BMI 41. Weight loss would help with HTN control.    COUNSELING:  Reviewed preventive health counseling, as reflected in patient instructions    BMI:   Estimated body mass index is 41.83 kg/m  as calculated from the following:    Height as of 4/20/23: 1.74 m (5' 8.5\").    Weight as of this encounter: 126.6 kg (279 lb 3.2 oz).     He reports that he quit smoking about 11 years ago. His smoking use included cigarettes. He started smoking about 22 years ago. He has a 10.00 pack-year smoking history. He has never used smokeless tobacco.    Appropriate preventive services were discussed with this patient, including applicable screening as appropriate for cardiovascular disease, diabetes, osteopenia/osteoporosis, and glaucoma.  As appropriate for age/gender, discussed screening for colorectal cancer, prostate cancer, breast cancer, and " cervical cancer. Checklist reviewing preventive services available has been given to the patient.    Reviewed patients plan of care and provided an AVS. The Complex Care Plan (for patients with higher acuity and needing more deliberate coordination of services) for Mike meets the Care Plan requirement. This Care Plan has been established and reviewed with the Patient.    Ranjan Bustamante MD  [facility]    Identified Health Risks:  I have reviewed Opioid Use Disorder and Substance Use Disorder risk factors and made any needed referrals.

## 2023-07-27 NOTE — PATIENT INSTRUCTIONS
- I will send you a message on [employer] when I am able to look at the results of your tests from today  - Make an appointment with our pharmacy downstairs or stop by your preferred pharmacy to discuss obtaining the Shingrix (shingles) vaccine series  - Call [phone] to schedule your ultrasound with our centralized imaging schedulers

## 2023-07-27 NOTE — TELEPHONE ENCOUNTER
7/27/23 Msg recd from Dr Day  When I saw him last a few years ago he was in afib without sxs. as long as he has no sxs with afib ok to be in and out of it.   [facility] 1110 am

## 2023-07-27 NOTE — TELEPHONE ENCOUNTER
7/27/23 Rx for Xarelto sent to [facility] in [place] per pt request. Pt updated via [facility] 220 pm

## 2023-08-02 ENCOUNTER — ANCILLARY PROCEDURE (OUTPATIENT)
Dept: ULTRASOUND IMAGING | Facility: CLINIC | Age: 73
End: 2023-08-02
Attending: INTERNAL MEDICINE
Payer: COMMERCIAL

## 2023-08-02 ENCOUNTER — TELEPHONE (OUTPATIENT)
Dept: OTHER | Facility: CLINIC | Age: 73
End: 2023-08-02
Payer: COMMERCIAL

## 2023-08-02 DIAGNOSIS — Z13.6 ENCOUNTER FOR SCREENING FOR ABDOMINAL AORTIC ANEURYSM (AAA) IN PATIENT 50 YEARS OF AGE OR OLDER WITH HISTORY OF SMOKING: ICD-10-CM

## 2023-08-02 DIAGNOSIS — Z87.891 ENCOUNTER FOR SCREENING FOR ABDOMINAL AORTIC ANEURYSM (AAA) IN PATIENT 50 YEARS OF AGE OR OLDER WITH HISTORY OF SMOKING: ICD-10-CM

## 2023-08-02 PROCEDURE — 76706 US ABDL AORTA SCREEN AAA: CPT

## 2023-08-02 NOTE — TELEPHONE ENCOUNTER
Pt referred to [facility] by Ranjan Courtney MD for suprarenal aortic dilatation with left common iliac artery dilatation    Pt needs to be scheduled for NEW VASCULAR PATIENT consult with Dr. Palmer or Dr. Sandoval.  Will route to scheduling to coordinate an appointment at next available.    Appt note: Ref by Dr. Ranjan Goyal for 3.8cm suprarenal aortic dilatation with 1.9cm left common iliac artery dilatation; notes and imaging in Epic.    Rachel De Souza, CASTRON, RN-[facility] [place]

## 2023-08-03 ENCOUNTER — HOSPITAL ENCOUNTER (OUTPATIENT)
Dept: CT IMAGING | Facility: CLINIC | Age: 73
Discharge: HOME | End: 2023-08-03
Attending: INTERNAL MEDICINE | Admitting: INTERNAL MEDICINE
Payer: COMMERCIAL

## 2023-08-03 DIAGNOSIS — I71.41 PARARENAL ABDOMINAL AORTIC ANEURYSM (AAA) WITHOUT RUPTURE (H): ICD-10-CM

## 2023-08-03 PROCEDURE — 250N000009 HC RX 250: Performed by: INTERNAL MEDICINE

## 2023-08-03 PROCEDURE — 250N000011 HC RX IP 250 OP 636: Performed by: INTERNAL MEDICINE

## 2023-08-03 PROCEDURE — 74174 CTA ABD&PLVS W/CONTRAST: CPT

## 2023-08-03 RX ORDER — IOPAMIDOL 755 MG/ML
80 INJECTION, SOLUTION INTRAVASCULAR ONCE
Status: COMPLETED | OUTPATIENT
Start: 2023-08-03 | End: 2023-08-03

## 2023-08-03 RX ADMIN — IOPAMIDOL 80 ML: 755 INJECTION, SOLUTION INTRAVENOUS at 07:28

## 2023-08-03 RX ADMIN — SODIUM CHLORIDE 80 ML: 9 INJECTION, SOLUTION INTRAVENOUS at 07:28

## 2023-08-04 ENCOUNTER — VIRTUAL VISIT (OUTPATIENT)
Dept: INTERNAL MEDICINE | Facility: CLINIC | Age: 73
End: 2023-08-04
Payer: COMMERCIAL

## 2023-08-04 DIAGNOSIS — R91.8 LUNG MASS: Primary | ICD-10-CM

## 2023-08-04 DIAGNOSIS — R91.8 MULTIPLE LUNG NODULES: ICD-10-CM

## 2023-08-04 PROCEDURE — 99214 OFFICE O/P EST MOD 30 MIN: CPT | Mod: VID | Performed by: INTERNAL MEDICINE

## 2023-08-04 NOTE — PROGRESS NOTES
"Brayden is a 72 year old who is being evaluated via a billable video visit.      How would you like to obtain your AVS? MyChart  If the video visit is dropped, the invitation should be resent by: Send to e-mail at: [email or web address]  Will anyone else be joining your video visit? No    Assessment & Plan   Lung mass  Discussed findings of recent imaging. Answered Brayden's questions to the best of my abilities. I recommended pursuing CT chest w/ contrast and he was in agreement. Discussed that if this study comes back concerning for cancer, I would recommend pursuing a biopsy at that point. He was in agreement with that as well.  - CT Chest w Contrast; Future    Ranjan Bustamante MD  [facility]    Subjective   Brayden is a 72 year old who presents for a next day virtual video visit with chief concern of: review recent CT scan results. I first met Brayden 7/27 for AWV. I recommended AAA screening given his age, gender, and history of smoking, and he accepted referral. That U/S was done 8/2 and noted an aneurysm. Radiology recommended following up this result with a CTA. That CTA was done yesterday (8/3) and thankfully demonstrated NO aneurysm (per radiology, \"Previous measurements are likely due to lack of adequate visualization\"). However, radiology did note: \"New bilateral pulmonary nodules noted in both lung bases,relatively small, though the largest measures up to 1.6 cm. These are not present on previous exam and raises concern as to etiology with metastatic/malignancy consideration. Recommend dedicated chest CT for complete evaluation of both lungs.\" Brayden is following up on these results today. He denies f/c, weight changes, hemoptysis, melena, hematuria.    Review of Systems   Constitutional, pulmonary systems are negative, except as otherwise noted.      Objective       Vitals: No vitals were obtained today due to virtual visit.    Physical Exam   GENERAL: Healthy, alert " and no distress  EYES: Eyes grossly normal to inspection.  No discharge or erythema, or obvious scleral/conjunctival abnormalities.  RESP: No audible wheeze, cough, or visible cyanosis.  No visible retractions or increased work of breathing.    SKIN: Visible skin clear. No significant rash, abnormal pigmentation or lesions.  NEURO: Cranial nerves grossly intact.  Mentation and speech appropriate for age.  PSYCH: Mentation appears normal, affect normal/bright, judgement and insight intact, normal speech and appearance well-groomed.    Video-Visit Details  Type of service: Video Visit   Video Start Time: 12:58 PM  Video End Time: 1:13 PM  Originating Location (pt. Location): Home  Distant Location (provider location):  Off-site  Platform used for Video Visit: Jeremy

## 2023-08-07 ENCOUNTER — HOSPITAL ENCOUNTER (OUTPATIENT)
Dept: CT IMAGING | Facility: CLINIC | Age: 73
Discharge: HOME | End: 2023-08-07
Attending: INTERNAL MEDICINE | Admitting: INTERNAL MEDICINE
Payer: COMMERCIAL

## 2023-08-07 DIAGNOSIS — R91.8 LUNG MASS: ICD-10-CM

## 2023-08-07 PROCEDURE — 250N000011 HC RX IP 250 OP 636: Performed by: INTERNAL MEDICINE

## 2023-08-07 PROCEDURE — 71260 CT THORAX DX C+: CPT

## 2023-08-07 PROCEDURE — 250N000009 HC RX 250: Performed by: INTERNAL MEDICINE

## 2023-08-07 RX ORDER — IOPAMIDOL 755 MG/ML
135 INJECTION, SOLUTION INTRAVASCULAR ONCE
Status: COMPLETED | OUTPATIENT
Start: 2023-08-07 | End: 2023-08-07

## 2023-08-07 RX ADMIN — SODIUM CHLORIDE 79 ML: 9 INJECTION, SOLUTION INTRAVENOUS at 07:54

## 2023-08-07 RX ADMIN — IOPAMIDOL 135 ML: 755 INJECTION, SOLUTION INTRAVENOUS at 07:54

## 2023-08-30 DIAGNOSIS — I10 BENIGN ESSENTIAL HYPERTENSION: ICD-10-CM

## 2023-08-30 DIAGNOSIS — E87.6 HYPOKALEMIA: ICD-10-CM

## 2023-08-30 RX ORDER — POTASSIUM CHLORIDE 1500 MG/1
20 TABLET, EXTENDED RELEASE ORAL 2 TIMES DAILY
Qty: 180 TABLET | Refills: 0 | Status: SHIPPED | OUTPATIENT
Start: 2023-08-30 | End: 2023-09-08

## 2023-08-31 ENCOUNTER — TELEPHONE (OUTPATIENT)
Dept: CARDIOLOGY | Facility: CLINIC | Age: 73
End: 2023-08-31
Payer: COMMERCIAL

## 2023-08-31 DIAGNOSIS — I48.0 PAROXYSMAL ATRIAL FIBRILLATION (H): Primary | ICD-10-CM

## 2023-09-01 ENCOUNTER — ANCILLARY PROCEDURE (OUTPATIENT)
Dept: CT IMAGING | Facility: CLINIC | Age: 73
End: 2023-09-01
Attending: INTERNAL MEDICINE
Payer: COMMERCIAL

## 2023-09-01 DIAGNOSIS — R91.8 MULTIPLE LUNG NODULES: ICD-10-CM

## 2023-09-01 PROCEDURE — 71250 CT THORAX DX C-: CPT

## 2023-09-06 ENCOUNTER — LAB (OUTPATIENT)
Dept: LAB | Facility: CLINIC | Age: 73
End: 2023-09-06
Payer: COMMERCIAL

## 2023-09-06 ENCOUNTER — HOSPITAL ENCOUNTER (OUTPATIENT)
Dept: CARDIOLOGY | Facility: CLINIC | Age: 73
Discharge: HOME | End: 2023-09-06
Attending: PHYSICIAN ASSISTANT | Admitting: PHYSICIAN ASSISTANT
Payer: COMMERCIAL

## 2023-09-06 DIAGNOSIS — I48.0 PAROXYSMAL ATRIAL FIBRILLATION (H): ICD-10-CM

## 2023-09-06 DIAGNOSIS — I35.0 NONRHEUMATIC AORTIC VALVE STENOSIS: ICD-10-CM

## 2023-09-06 LAB
ANION GAP SERPL CALCULATED.3IONS-SCNC: 9 MMOL/L (ref 7–15)
BI-PLANE LVEF ECHO: NORMAL
BUN SERPL-MCNC: 19.2 MG/DL (ref 8–23)
CALCIUM SERPL-MCNC: 8.6 MG/DL (ref 8.8–10.2)
CHLORIDE SERPL-SCNC: 99 MMOL/L (ref 98–107)
CREAT SERPL-MCNC: 1.05 MG/DL (ref 0.67–1.17)
DEPRECATED HCO3 PLAS-SCNC: 33 MMOL/L (ref 22–29)
GFR SERPL CREATININE-BSD FRML MDRD: 76 ML/MIN/1.73M2
GLUCOSE SERPL-MCNC: 123 MG/DL (ref 70–99)
POTASSIUM SERPL-SCNC: 3.8 MMOL/L (ref 3.4–5.3)
SODIUM SERPL-SCNC: 141 MMOL/L (ref 136–145)

## 2023-09-06 PROCEDURE — 80048 BASIC METABOLIC PNL TOTAL CA: CPT | Performed by: PHYSICIAN ASSISTANT

## 2023-09-06 PROCEDURE — 36415 COLL VENOUS BLD VENIPUNCTURE: CPT | Performed by: PHYSICIAN ASSISTANT

## 2023-09-06 PROCEDURE — 93306 TTE W/DOPPLER COMPLETE: CPT | Mod: 26 | Performed by: INTERNAL MEDICINE

## 2023-09-06 PROCEDURE — 93306 TTE W/DOPPLER COMPLETE: CPT

## 2023-09-06 NOTE — PROGRESS NOTES
[facility]    I had the pleasure of seeing Marty he came for follow up of AFib.  This 72 year old sees Dr. Day for his history of:    1. H/o persistent AFib. EF 50-55% 1/2022 - first dx'd in 2012. Failed flecainide (and noted scar on MRI). Underwent PVI and SVC isolation 11/2017. Repeat PVI and CTI for inducible AFlutter 4/2019. Started on Amio with DCCV 11/19/2020 for recurrent AFib in setting of increased alcohol use.   Amiodarone decreased 1/2021; back in persistent AFib 12/2021 and amiodarone stopped as felt asx'c.  This was restarted 8/2022, now s/p DCCV 8/22/2022. Amiodarone stopped again 1/2023. AFib restarted 7/2023 after EtOH use.  2. Moderate Aortic Stenosis - echo 8/2023 with mean gradient 16 mmHg  3. Benign Essential HTN   4. BOWEN   5. Myocardiac Scar noted on cardiac MRI 10/2017, consistent with prior MI in the left Cx territory. EF 51%.  Avoiding flecainide use  6. H/o Alcohol dependence - no EtOH since 2017, then again 3-4 months in early 2022 after he was noted to be back in AFib.Back in AFib with EtOH use 7/2023  7. Chronic AC  On Xarelto for CHADSVASc 3 (HTN, age, aortic plaquing)  8. Bell's Palsy  9. Lung nodules- noted on CTA to follow-up on aorta 8/2023.  Will meet with IR for possible biopsy in the coming weeks      Last Visit & Interval History:  I saw Brayden 4/2023 at which time he was doing well, without recurrent AFib. BPs were running high when checked at [facility]. I asked him to switch Lasix 20 to hydrochlorothiazide 25 with follow-up BMP for better BP control.  Follow-up BMP looked OK.    He contacted us via [facility] 7/2023 noting recurrent AFib after drinking alcohol again.Dr Day reviewed and noted that if he were asx'c, OK to continue in AFib.  He wrote just 9/6 requesting DCCV due to increased fluid retention (which is typically his sx when in AFib).     Updated echo 9/6 showed relatively stable LVEF 49% and mod AoS.    He had an US for AAA screening which led  "to CT. This showed pulmonary masses and CT with contrast then done, with recommendation to repeat 1 month. This was done 9/1 and showed slight enlargement and bx is planned with IR.    Today's Visit:  Brayden notes increased problems with diaphoresis (unsure if one side of face affected), weight gain/swelling which is always his side effects.  He notes it's \"self inflicted\" as it started again with EtoH intake.    Notes some mild lightheadedness when first standing up. No falls/faints.    Gongora's Palsy improved but R eustachian tubes remain a bit dysfunctional.     No orthopnea, PND. Takes Lasix 3x/week along with the daily hydrochlorothiazide. Reviewed that we'd started Lasix due to lack of edema and higher BPs, but may need to go back on daily Lasix.    VITALS:  Vitals: BP (!) 151/101   Pulse 64   Resp 17   Ht 1.727 m (5' 8\")   Wt 129.7 kg (286 lb)   SpO2 98%   BMI 43.49 kg/m      Diagnostic Testing:  EKG today, which I overread, showed AFib 85 bpm  Echo 9/6/2023 showed LVEF 49%. Nl RV. Nl LA size. 1+MR. Mod AoS with mean gradient 16mmHg, Vmax 2.7 m/s, DI 0.31  Aorta 8/2023 with aneuryamsl dilation of suprarenal aorta (3.6x3.8). follow-up CTA showed no aneurysm  Echo 10/18/2022 with low-normal LVEF 50-55%. Nl RV. MIld-mod AoS with mean gradient 18 mmHg, Vmax 2.9 m/s, MARILYN 1.3 cm2, DI 0.36. Mildly dilated asc ao 4.3 com  Echo 1/10/69974 showed EF 50-55% LVSF mildly reduced. No RWMA. Nl RV. LA mildly dilated 4.1 cm with volume index 35.7ml/m2. Nl MV. Trace TR. Trace AI. MARILYN 1.4 cm2 with mean gradient 12.7mmHg c/w mild-mod AoS. Asc aorta mildly dilated 4.1 cm  Holter Monitor 12/29/2021 on no AVn blocking agents showed AFib with avg HR 81 bpm (range 44--143 bpm). 2% PVC burden  EKG 7/2021 showed sinus bradycardia 45 bpm.  Nonspecific ST-T wave changes  Echocardiogram 3/2019 showed EF of 55-60%.  No regional wall motion abnormalities were noted.  He had 1+ mitral regurgitation and 1+ tricuspid regurgitation.  RV was " normal in structure, function and size.  Aortic valve showed just trace to mild aortic regurgitation with aortic sclerosis.  Mean gradient was 4.6 mmHg (improved from previous) volume index is 46.7 mL/m  with LA dimension of 4.9 cm.  Cardiac MRI 10/2017 showed an EF of 51% with mild hypokinesis involving the basal      Plan:  Cardioversion - he declines restarting amiodarone, though understands that that would likely increase chance he will remain in SR/successful cardioversion  2.    Stop hydrochlorothiazide   3.     Start furosemide 20 mg daily. Continue KCl 20 mEq BID    Assessment/Plan:    H/o Persistent AFib  [place] he was having increasing DELEON/SOB from recurrent AFib and underwent DCCV with amiodarone 8/2022  Amiodarone decreased 10/2022 given concern for weight gain and abnl TSH, then completely stopped per his wishes 1/2023.   He has started to feel poorly, with increased diaphoresis and weight gain/fluid retention. Complicating this is need for lung mass bx by IR, which will most likely require holding AC.  Echo 9/2023 with stable LVEF 49%    Reviewed that in the past, we have felt his atrial fibrillation was more asymptomatic, but he does tend to get fluid retention, increased DELEON and diaphoresis when he is in atrial fibrillation and feels like continuing in atrial fibrillation is not ideal    Confirmed that he has missed no doses of Xarelto therapy    PLAN:  Cardioversion.We discussed Risks, Benefits and Indications of proceeding with transesophageal echocardiogram (RAMIRO), including but not limited to use of conscious sedation, sore throat, esophageal injury and discomfort. We discussed Risk, Benefits and Indication of proceeding with direct current cardioversion (DCCV), including but not limited to use of anesthesia, surface burns, [id number] arrhythmias requiring further treatment, discomfort and stroke.  The patient voiced understanding and understands that a  will be needed given the use of  "conscious sedation. A consent form will be signed by the procedural physician.  No solid food 8 hours prior to arrival, then OK for clear liquids up until 2 hours prior to arrival, then nothing to eat/drink until after procedure.  HOLD Lasix (furosemide) morning of procedure  NO MISSED DOSES OF XARELTO  Home same day with   CANNOT STOP XARELTO x 4 weeks after successful cardioversion     I suggested that Brayden start amiodarone today, but he is disinclined to do so as he \"knows what triggered AFib.\"  He understands that starting amiodarone would likely increase the chances of successful cardioversion/maintaining SR but he continues to decline  Follow-up will be based on cardioversion results and plan for IR    Mild cardiomyopathy, fluid retention  Unfortunately, has been retaining fluid, which is one of of his common complaints when in atrial fibrillation   On exam, he is fluid overloaded on exam, with JVD, HJR and edema    PLAN:  Stop HCTZ 25 mg daily  Lasix 20 mg daily, KCl 20 mEq daily  Follow-up to be determined based on cardioversion results    Aortic Stenosis  Mild/moderate on echo 10/2022, stable on echo 9/2023    PLAN:  Continue routine follow-up with echoes        Crystal Campos PA-C, MSPAS      Orders Placed This Encounter   Procedures    EKG 12-lead complete w/read - Clinics (performed today)    Cardioversion     Orders Placed This Encounter   Medications    potassium chloride ER (KLOR-CON M) 20 MEQ CR tablet     Sig: Take 1 tablet (20 mEq) by mouth 2 times daily     Dispense:  180 tablet     Refill:  3     Keep on file until pt due for refills    furosemide (LASIX) 20 MG tablet     Sig: Take 1 tablet (20 mg) by mouth daily     Dispense:  90 tablet     Refill:  3     Keep on file until pt due for refills    rivaroxaban ANTICOAGULANT (XARELTO ANTICOAGULANT) 20 MG TABS tablet     Sig: Take 1 tablet (20 mg) by mouth daily (with dinner)     Dispense:  90 tablet     Refill:  3     Keep on file until pt due " "for refills     Medications Discontinued During This Encounter   Medication Reason    hydrochlorothiazide (HYDRODIURIL) 25 MG tablet     potassium chloride ER (KLOR-CON M) 20 MEQ CR tablet Reorder (No AVS)    furosemide (LASIX) 20 MG tablet Reorder (No AVS)    rivaroxaban ANTICOAGULANT (XARELTO ANTICOAGULANT) 20 MG TABS tablet Reorder (No AVS)         Encounter Diagnoses   Name Primary?    Aortic valve stenosis     Persistent atrial fibrillation (H) Yes    Benign essential hypertension     Hypokalemia        CURRENT MEDICATIONS:  Current Outpatient Medications   Medication Sig Dispense Refill    allopurinol (ZYLOPRIM) 300 MG tablet Take 1 tablet (300 mg) by mouth daily 90 tablet 4    cetirizine (ZYRTEC) 10 MG tablet Take 10 mg by mouth as needed for allergies      finasteride (PROPECIA) 1 MG tablet Take 1 tablet (1 mg) by mouth daily 90 tablet 4    furosemide (LASIX) 20 MG tablet Take 1 tablet (20 mg) by mouth daily 90 tablet 3    losartan (COZAAR) 100 MG tablet Take 1 tablet (100 mg) by mouth daily 90 tablet 4    potassium chloride ER (KLOR-CON M) 20 MEQ CR tablet Take 1 tablet (20 mEq) by mouth 2 times daily 180 tablet 3    rivaroxaban ANTICOAGULANT (XARELTO ANTICOAGULANT) 20 MG TABS tablet Take 1 tablet (20 mg) by mouth daily (with dinner) 90 tablet 3       ALLERGIES     Allergies   Allergen Reactions    No Known Drug Allergy          Review of Systems:  Skin:  Negative     Eyes:  Positive for visual blurring  ENT:  Negative    Respiratory:  Negative for sleep apnea;dyspnea on exertion  Cardiovascular:  Negative for;palpitations;chest pain edema;Positive for;exercise intolerance  Gastroenterology: Negative for melena;hematochezia  Genitourinary:  Positive for nocturia  Musculoskeletal:  Negative for muscular weakness  Neurologic:  Positive for    Psychiatric:  Negative    Heme/Lymph/Imm:  Negative    Endocrine:  Negative      Physical Exam:  Vitals: BP (!) 151/101   Pulse 64   Resp 17   Ht 1.727 m (5' 8\")   " Wt 129.7 kg (286 lb)   SpO2 98%   BMI 43.49 kg/m      Constitutional:  cooperative, alert and oriented, well developed, well nourished, in no acute distress        Skin:  warm and dry to the touch, no apparent skin lesions or masses noted        Head:  normocephalic, no masses or lesions        Eyes:  pupils equal and round;conjunctivae and lids unremarkable;sclera white        ENT:      Slight R mouth droopiness - [place] Palsy    Neck:    JVP elevated      Chest:  normal breath sounds, clear to auscultation, normal A-P diameter, normal symmetry, normal respiratory excursion, no use of accessory muscles        Cardiac:   irregularly irregular rhythm       systolic ejection murmur;grade 2        Abdomen:  abdomen soft obese      Vascular: pulses full and equal                                      Extremities and Back:  no deformities, clubbing, cyanosis, erythema observed        Neurological:  no gross motor deficits            PAST MEDICAL HISTORY:  Past Medical History:   Diagnosis Date    Cancer (H)     basal nose    H/O ETOH abuse     Herpes simplex without mention of complication     Hyperlipidemia     Obesity     Persistent atrial fibrillation (H)     persistent, DCCV 12/2017, ablation 11/6/17    Sleep apnea     CPAP    Tobacco use disorder     dced 6/2004       PAST SURGICAL HISTORY:  Past Surgical History:   Procedure Laterality Date    ABLATION OF DYSRHYTHMIC FOCUS  2018, 04/12/2019    Procedure: EP Ablation Focal AFIB;  Surgeon: Fady Day MD;  Location:  [facility]    ACHILLES TENDON SURGERY  1997    ANESTHESIA CARDIOVERSION N/A 2/26/2019    Procedure: ANESTHESIA CARDIOVERSION (CONNER);  Surgeon: GENERIC ANESTHESIA PROVIDER;  Location:  OR    ANESTHESIA CARDIOVERSION N/A 11/19/2020    Procedure: ANESTHESIA, FOR CARDIOVERSION (dr campbell);  Surgeon: GENERIC ANESTHESIA PROVIDER;  Location:  OR    ANESTHESIA CARDIOVERSION N/A 8/22/2022    Procedure: CARDIOVERSION;  Surgeon: GENERIC  ANESTHESIA PROVIDER;  Location:  OR    BASAL CELL CARCINOMA EXCISION Right     nose, took cartilage from inner ear.    CARDIOVERSION  2019    cardioversion for atr fib    COLONOSCOPY      COLONOSCOPY N/A 3/22/2023    Procedure: COLONOSCOPY, FLEXIBLE, WITH LESION REMOVAL USING SNARE;  Surgeon: Anne Lucero MD;  Location:  GI    EP ABLATION FOCAL AFIB N/A 2019    Procedure: EP Ablation Focal AFIB;  Surgeon: Fady Day MD;  Location:  [facility]    HERNIA REPAIR, UMBILICAL  2012    Procedure: HERNIORRHAPHY UMBILICAL;  UMBILICAL HERNIA REPAIR;  Surgeon: Ra Crocker MD;  Location: [facility]    HERNIORRHAPHY UMBILICAL  2012    Procedure: HERNIORRHAPHY UMBILICAL;  UMBILICAL HERNIA REPAIR;  Surgeon: Ra Crocker MD;  Location: [facility]    ORTHOPEDIC SURGERY      ORTHOPEDIC SURGERY      OTHER SURGICAL HISTORY Right 2017    total KNEE ARTHROSCOPY     TOTAL KNEE ARTHROPLASTY Left 2018    ZZC NONSPECIFIC PROCEDURE  1997    achilles tendon    ZZC NONSPECIFIC PROCEDURE      knees, arthroscopy    ZZC NONSPECIFIC PROCEDURE      basal cell skin ca, nose    ZZC NONSPECIFIC PROCEDURE      flex sig; colonoscopy due 3/2008    ZZC NONSPECIFIC PROCEDURE      cardioversion for atr fib    ZZC TOTAL KNEE ARTHROPLASTY Left 2018    Dr. Malick Suarez       FAMILY HISTORY:  Family History   Problem Relation Age of Onset    Family History Negative Mother     Heart Disease Father         decesased at 42 - MI    Heart Disease Sister          MI    Lipids Sister     Lipids Sister     No Known Problems Sister     Heart Disease Brother          MI    Heart Disease Brother         CABG AT 49       SOCIAL HISTORY:  Social History     Socioeconomic History    Marital status: Single   Tobacco Use    Smoking status: Former     Packs/day: 1.00     Years: 10.00     Pack years: 10.00     Types: Cigarettes     Start date:      Quit date: 2011      Years since quittin.0    Smokeless tobacco: Never   Vaping Use    Vaping Use: Never used   Substance and Sexual Activity    Alcohol use: No    Drug use: No    Sexual activity: Yes     Partners: Female

## 2023-09-07 ENCOUNTER — TELEPHONE (OUTPATIENT)
Dept: INTERVENTIONAL RADIOLOGY/VASCULAR | Facility: CLINIC | Age: 73
End: 2023-09-07
Payer: COMMERCIAL

## 2023-09-07 DIAGNOSIS — R91.8 LUNG MASS: Primary | ICD-10-CM

## 2023-09-07 DIAGNOSIS — R91.8 MULTIPLE LUNG NODULES: ICD-10-CM

## 2023-09-07 NOTE — TELEPHONE ENCOUNTER
Received an IR OP biopsy referral for patient Brayden Schultz for a lung lesion biopsy.     Imaging and chart notes reviewed by Radiologist Dr Holley Montana who recommends a pulmonary consult for further work up and waiting another 3 months to re image the chest prior to proceeding with biopsy. Most of the lesions are small, basilar nodules which are not amenable for percutaneous biopsy.     Please with any questions call the Radiologist at  for further questions.     Thanks, [facility] Interventional Radiology CNP ([phone]) (phone [phone])

## 2023-09-08 ENCOUNTER — OFFICE VISIT (OUTPATIENT)
Dept: CARDIOLOGY | Facility: CLINIC | Age: 73
End: 2023-09-08
Payer: COMMERCIAL

## 2023-09-08 ENCOUNTER — PATIENT OUTREACH (OUTPATIENT)
Dept: ONCOLOGY | Facility: CLINIC | Age: 73
End: 2023-09-08

## 2023-09-08 VITALS
SYSTOLIC BLOOD PRESSURE: 151 MMHG | RESPIRATION RATE: 17 BRPM | WEIGHT: 286 LBS | DIASTOLIC BLOOD PRESSURE: 101 MMHG | BODY MASS INDEX: 43.35 KG/M2 | HEART RATE: 64 BPM | OXYGEN SATURATION: 98 % | HEIGHT: 68 IN

## 2023-09-08 DIAGNOSIS — I35.0 NONRHEUMATIC AORTIC VALVE STENOSIS: ICD-10-CM

## 2023-09-08 DIAGNOSIS — I48.19 PERSISTENT ATRIAL FIBRILLATION (H): Primary | ICD-10-CM

## 2023-09-08 DIAGNOSIS — E87.6 HYPOKALEMIA: ICD-10-CM

## 2023-09-08 DIAGNOSIS — I10 BENIGN ESSENTIAL HYPERTENSION: ICD-10-CM

## 2023-09-08 PROCEDURE — 99214 OFFICE O/P EST MOD 30 MIN: CPT | Performed by: PHYSICIAN ASSISTANT

## 2023-09-08 PROCEDURE — 93000 ELECTROCARDIOGRAM COMPLETE: CPT | Performed by: PHYSICIAN ASSISTANT

## 2023-09-08 RX ORDER — FUROSEMIDE 20 MG
20 TABLET ORAL DAILY
Qty: 90 TABLET | Refills: 3 | Status: SHIPPED | OUTPATIENT
Start: 2023-09-08 | End: 2023-11-17

## 2023-09-08 RX ORDER — POTASSIUM CHLORIDE 1500 MG/1
20 TABLET, EXTENDED RELEASE ORAL 2 TIMES DAILY
Qty: 180 TABLET | Refills: 3 | Status: SHIPPED | OUTPATIENT
Start: 2023-09-08 | End: 2024-02-07

## 2023-09-08 NOTE — LETTER
9/8/2023    Ranjan Courtney MD  [address] [facility] [address]    RE: Mike Schultz       Dear Colleague,     I had the pleasure of seeing Mike Schultz in the [facility].  [facility]    I had the pleasure of seeing Marty he came for follow up of AFib.  This 72 year old sees Dr. Day for his history of:    1. H/o persistent AFib. EF 50-55% 1/2022 - first dx'd in 2012. Failed flecainide (and noted scar on MRI). Underwent PVI and SVC isolation 11/2017. Repeat PVI and CTI for inducible AFlutter 4/2019. Started on Amio with DCCV 11/19/2020 for recurrent AFib in setting of increased alcohol use.   Amiodarone decreased 1/2021; back in persistent AFib 12/2021 and amiodarone stopped as felt asx'c.  This was restarted 8/2022, now s/p DCCV 8/22/2022. Amiodarone stopped again 1/2023. AFib restarted 7/2023 after EtOH use.  2. Moderate Aortic Stenosis - echo 8/2023 with mean gradient 16 mmHg  3. Benign Essential HTN   4. BOWEN   5. Myocardiac Scar noted on cardiac MRI 10/2017, consistent with prior MI in the left Cx territory. EF 51%.  Avoiding flecainide use  6. H/o Alcohol dependence - no EtOH since 2017, then again 3-4 months in early 2022 after he was noted to be back in AFib.Back in AFib with EtOH use 7/2023  7. Chronic AC  On Xarelto for CHADSVASc 3 (HTN, age, aortic plaquing)  8. Bell's Palsy  9. Lung nodules- noted on CTA to follow-up on aorta 8/2023.  Will meet with IR for possible biopsy in the coming weeks      Last Visit & Interval History:  I saw Brayden 4/2023 at which time he was doing well, without recurrent AFib. BPs were running high when checked at [facility]. I asked him to switch Lasix 20 to hydrochlorothiazide 25 with follow-up BMP for better BP control.  Follow-up BMP looked OK.    He contacted us via [employer] 7/2023 noting recurrent AFib after drinking alcohol again.Dr Day reviewed and noted that if he were asx'c, OK to continue in AFib.  He wrote just 9/6  "requesting DCCV due to increased fluid retention (which is typically his sx when in AFib).     Updated echo 9/6 showed relatively stable LVEF 49% and mod AoS.    He had an US for AAA screening which led to CT. This showed pulmonary masses and CT with contrast then done, with recommendation to repeat 1 month. This was done 9/1 and showed slight enlargement and bx is planned with IR.    Today's Visit:  Brayden notes increased problems with diaphoresis (unsure if one side of face affected), weight gain/swelling which is always his side effects.  He notes it's \"self inflicted\" as it started again with EtoH intake.    Notes some mild lightheadedness when first standing up. No falls/faints.    Gongora's Palsy improved but R eustachian tubes remain a bit dysfunctional.     No orthopnea, PND. Takes Lasix 3x/week along with the daily hydrochlorothiazide. Reviewed that we'd started Lasix due to lack of edema and higher BPs, but may need to go back on daily Lasix.    VITALS:  Vitals: BP (!) 151/101   Pulse 64   Resp 17   Ht 1.727 m (5' 8\")   Wt 129.7 kg (286 lb)   SpO2 98%   BMI 43.49 kg/m      Diagnostic Testing:  EKG today, which I overread, showed AFib 85 bpm  Echo 9/6/2023 showed LVEF 49%. Nl RV. Nl LA size. 1+MR. Mod AoS with mean gradient 16mmHg, Vmax 2.7 m/s, DI 0.31  Aorta 8/2023 with aneuryamsl dilation of suprarenal aorta (3.6x3.8). follow-up CTA showed no aneurysm  Echo 10/18/2022 with low-normal LVEF 50-55%. Nl RV. MIld-mod AoS with mean gradient 18 mmHg, Vmax 2.9 m/s, MARILYN 1.3 cm2, DI 0.36. Mildly dilated asc ao 4.3 com  Echo 1/10/81542 showed EF 50-55% LVSF mildly reduced. No RWMA. Nl RV. LA mildly dilated 4.1 cm with volume index 35.7ml/m2. Nl MV. Trace TR. Trace AI. MARILYN 1.4 cm2 with mean gradient 12.7mmHg c/w mild-mod AoS. Asc aorta mildly dilated 4.1 cm  Holter Monitor 12/29/2021 on no AVn blocking agents showed AFib with avg HR 81 bpm (range 44--143 bpm). 2% PVC burden  EKG 7/2021 showed sinus bradycardia 45 " bpm.  Nonspecific ST-T wave changes  Echocardiogram 3/2019 showed EF of 55-60%.  No regional wall motion abnormalities were noted.  He had 1+ mitral regurgitation and 1+ tricuspid regurgitation.  RV was normal in structure, function and size.  Aortic valve showed just trace to mild aortic regurgitation with aortic sclerosis.  Mean gradient was 4.6 mmHg (improved from previous) volume index is 46.7 mL/m  with LA dimension of 4.9 cm.  Cardiac MRI 10/2017 showed an EF of 51% with mild hypokinesis involving the basal      Plan:  Cardioversion - he declines restarting amiodarone, though understands that that would likely increase chance he will remain in SR/successful cardioversion  2.    Stop hydrochlorothiazide   3.     Start furosemide 20 mg daily. Continue KCl 20 mEq BID    Assessment/Plan:    H/o Persistent AFib  [place] he was having increasing DELEON/SOB from recurrent AFib and underwent DCCV with amiodarone 8/2022  Amiodarone decreased 10/2022 given concern for weight gain and abnl TSH, then completely stopped per his wishes 1/2023.   He has started to feel poorly, with increased diaphoresis and weight gain/fluid retention. Complicating this is need for lung mass bx by IR, which will most likely require holding AC.  Echo 9/2023 with stable LVEF 49%    Reviewed that in the past, we have felt his atrial fibrillation was more asymptomatic, but he does tend to get fluid retention, increased DELEON and diaphoresis when he is in atrial fibrillation and feels like continuing in atrial fibrillation is not ideal    Confirmed that he has missed no doses of Xarelto therapy    PLAN:  Cardioversion.We discussed Risks, Benefits and Indications of proceeding with transesophageal echocardiogram (RAMIRO), including but not limited to use of conscious sedation, sore throat, esophageal injury and discomfort. We discussed Risk, Benefits and Indication of proceeding with direct current cardioversion (DCCV), including but not limited to use of  "anesthesia, surface burns, [id number] arrhythmias requiring further treatment, discomfort and stroke.  The patient voiced understanding and understands that a  will be needed given the use of conscious sedation. A consent form will be signed by the procedural physician.  No solid food 8 hours prior to arrival, then OK for clear liquids up until 2 hours prior to arrival, then nothing to eat/drink until after procedure.  HOLD Lasix (furosemide) morning of procedure  NO MISSED DOSES OF XARELTO  Home same day with   CANNOT STOP XARELTO x 4 weeks after successful cardioversion     I suggested that Brayden start amiodarone today, but he is disinclined to do so as he \"knows what triggered AFib.\"  He understands that starting amiodarone would likely increase the chances of successful cardioversion/maintaining SR but he continues to decline  Follow-up will be based on cardioversion results and plan for IR    Mild cardiomyopathy, fluid retention  Unfortunately, has been retaining fluid, which is one of of his common complaints when in atrial fibrillation   On exam, he is fluid overloaded on exam, with JVD, HJR and edema    PLAN:  Stop HCTZ 25 mg daily  Lasix 20 mg daily, KCl 20 mEq daily  Follow-up to be determined based on cardioversion results    Aortic Stenosis  Mild/moderate on echo 10/2022, stable on echo 9/2023    PLAN:  Continue routine follow-up with echoes        Crystal Campos PA-C, MSPAS      Orders Placed This Encounter   Procedures    EKG 12-lead complete w/read - Clinics (performed today)    Cardioversion     Orders Placed This Encounter   Medications    potassium chloride ER (KLOR-CON M) 20 MEQ CR tablet     Sig: Take 1 tablet (20 mEq) by mouth 2 times daily     Dispense:  180 tablet     Refill:  3     Keep on file until pt due for refills    furosemide (LASIX) 20 MG tablet     Sig: Take 1 tablet (20 mg) by mouth daily     Dispense:  90 tablet     Refill:  3     Keep on file until pt due for refills "    rivaroxaban ANTICOAGULANT (XARELTO ANTICOAGULANT) 20 MG TABS tablet     Sig: Take 1 tablet (20 mg) by mouth daily (with dinner)     Dispense:  90 tablet     Refill:  3     Keep on file until pt due for refills     Medications Discontinued During This Encounter   Medication Reason    hydrochlorothiazide (HYDRODIURIL) 25 MG tablet     potassium chloride ER (KLOR-CON M) 20 MEQ CR tablet Reorder (No AVS)    furosemide (LASIX) 20 MG tablet Reorder (No AVS)    rivaroxaban ANTICOAGULANT (XARELTO ANTICOAGULANT) 20 MG TABS tablet Reorder (No AVS)         Encounter Diagnoses   Name Primary?    Aortic valve stenosis     Persistent atrial fibrillation (H) Yes    Benign essential hypertension     Hypokalemia        CURRENT MEDICATIONS:  Current Outpatient Medications   Medication Sig Dispense Refill    allopurinol (ZYLOPRIM) 300 MG tablet Take 1 tablet (300 mg) by mouth daily 90 tablet 4    cetirizine (ZYRTEC) 10 MG tablet Take 10 mg by mouth as needed for allergies      finasteride (PROPECIA) 1 MG tablet Take 1 tablet (1 mg) by mouth daily 90 tablet 4    furosemide (LASIX) 20 MG tablet Take 1 tablet (20 mg) by mouth daily 90 tablet 3    losartan (COZAAR) 100 MG tablet Take 1 tablet (100 mg) by mouth daily 90 tablet 4    potassium chloride ER (KLOR-CON M) 20 MEQ CR tablet Take 1 tablet (20 mEq) by mouth 2 times daily 180 tablet 3    rivaroxaban ANTICOAGULANT (XARELTO ANTICOAGULANT) 20 MG TABS tablet Take 1 tablet (20 mg) by mouth daily (with dinner) 90 tablet 3       ALLERGIES     Allergies   Allergen Reactions    No Known Drug Allergy          Review of Systems:  Skin:  Negative     Eyes:  Positive for visual blurring  ENT:  Negative    Respiratory:  Negative for sleep apnea;dyspnea on exertion  Cardiovascular:  Negative for;palpitations;chest pain edema;Positive for;exercise intolerance  Gastroenterology: Negative for melena;hematochezia  Genitourinary:  Positive for nocturia  Musculoskeletal:  Negative for muscular  "weakness  Neurologic:  Positive for    Psychiatric:  Negative    Heme/Lymph/Imm:  Negative    Endocrine:  Negative      Physical Exam:  Vitals: BP (!) 151/101   Pulse 64   Resp 17   Ht 1.727 m (5' 8\")   Wt 129.7 kg (286 lb)   SpO2 98%   BMI 43.49 kg/m      Constitutional:  cooperative, alert and oriented, well developed, well nourished, in no acute distress        Skin:  warm and dry to the touch, no apparent skin lesions or masses noted        Head:  normocephalic, no masses or lesions        Eyes:  pupils equal and round;conjunctivae and lids unremarkable;sclera white        ENT:      Slight R mouth droopiness - [place] Palsy    Neck:    JVP elevated      Chest:  normal breath sounds, clear to auscultation, normal A-P diameter, normal symmetry, normal respiratory excursion, no use of accessory muscles        Cardiac:   irregularly irregular rhythm       systolic ejection murmur;grade 2        Abdomen:  abdomen soft obese      Vascular: pulses full and equal                                      Extremities and Back:  no deformities, clubbing, cyanosis, erythema observed        Neurological:  no gross motor deficits            PAST MEDICAL HISTORY:  Past Medical History:   Diagnosis Date    Cancer (H)     basal nose    H/O ETOH abuse     Herpes simplex without mention of complication     Hyperlipidemia     Obesity     Persistent atrial fibrillation (H)     persistent, DCCV 12/2017, ablation 11/6/17    Sleep apnea     CPAP    Tobacco use disorder     dced 6/2004       PAST SURGICAL HISTORY:  Past Surgical History:   Procedure Laterality Date    ABLATION OF DYSRHYTHMIC FOCUS  2018, 04/12/2019    Procedure: EP Ablation Focal AFIB;  Surgeon: Fady Day MD;  Location:  HEART CARDIAC CATH LAB    ACHILLES TENDON SURGERY  1997    ANESTHESIA CARDIOVERSION N/A 2/26/2019    Procedure: ANESTHESIA CARDIOVERSION (CONNER);  Surgeon: GENERIC ANESTHESIA PROVIDER;  Location:  OR    ANESTHESIA CARDIOVERSION N/A 11/19/2020 "    Procedure: ANESTHESIA, FOR CARDIOVERSION (dr campbell);  Surgeon: GENERIC ANESTHESIA PROVIDER;  Location:  OR    ANESTHESIA CARDIOVERSION N/A 2022    Procedure: CARDIOVERSION;  Surgeon: GENERIC ANESTHESIA PROVIDER;  Location:  OR    BASAL CELL CARCINOMA EXCISION Right     nose, took cartilage from inner ear.    CARDIOVERSION  2019    cardioversion for atr fib    COLONOSCOPY      COLONOSCOPY N/A 3/22/2023    Procedure: COLONOSCOPY, FLEXIBLE, WITH LESION REMOVAL USING SNARE;  Surgeon: Anne Lucero MD;  Location:  GI    EP ABLATION FOCAL AFIB N/A 2019    Procedure: EP Ablation Focal AFIB;  Surgeon: Fady Day MD;  Location:  HEART CARDIAC CATH LAB    HERNIA REPAIR, UMBILICAL  2012    Procedure: HERNIORRHAPHY UMBILICAL;  UMBILICAL HERNIA REPAIR;  Surgeon: Ra Crocker MD;  Location: [facility]    HERNIORRHAPHY UMBILICAL  2012    Procedure: HERNIORRHAPHY UMBILICAL;  UMBILICAL HERNIA REPAIR;  Surgeon: Ra Crocker MD;  Location: [facility]    ORTHOPEDIC SURGERY      ORTHOPEDIC SURGERY      OTHER SURGICAL HISTORY Right 2017    total KNEE ARTHROSCOPY     TOTAL KNEE ARTHROPLASTY Left 2018    ZZC NONSPECIFIC PROCEDURE  1997    achilles tendon    ZZC NONSPECIFIC PROCEDURE      knees, arthroscopy    ZZC NONSPECIFIC PROCEDURE      basal cell skin ca, nose    ZZC NONSPECIFIC PROCEDURE      flex sig; colonoscopy due 3/2008    ZZC NONSPECIFIC PROCEDURE      cardioversion for atr fib    ZZC TOTAL KNEE ARTHROPLASTY Left 2018    Dr. Malick Suarez       FAMILY HISTORY:  Family History   Problem Relation Age of Onset    Family History Negative Mother     Heart Disease Father         decesased at 42 - MI    Heart Disease Sister          MI    Lipids Sister     Lipids Sister     No Known Problems Sister     Heart Disease Brother          MI    Heart Disease Brother         CABG AT 49       SOCIAL HISTORY:  Social History     Socioeconomic  History    Marital status: Single   Tobacco Use    Smoking status: Former     Packs/day: 1.00     Years: 10.00     Pack years: 10.00     Types: Cigarettes     Start date:      Quit date: 2011     Years since quittin.0    Smokeless tobacco: Never   Vaping Use    Vaping Use: Never used   Substance and Sexual Activity    Alcohol use: No    Drug use: No    Sexual activity: Yes     Partners: Female             Thank you for allowing me to participate in the care of your patient.      Sincerely,     Natalie Campos PA-C     [facility]  cc:   Natalie Campos PA-C  [address] [place] [id number]

## 2023-09-08 NOTE — PROGRESS NOTES
Brayden asked to be transferred to me to ask some questions.  He was wondering why he has to have a consult and not just be having a biopsy.  I went over why and he understands and appreciated the explanation. Answered other questions he had.

## 2023-09-08 NOTE — PATIENT INSTRUCTIONS
Brayden - it was good to see you today!    EKG today confirmed AFib as you're aware  Reviewed that you're having more symptoms with it - sweating (both sides of face?) and fluid retention despite echo 9/2023 showing OK EF    PLAN:  Cardioversion  No solid food 8 hours prior to arrival, then OK for clear liquids up until 2 hours prior to arrival, then nothing to eat/drink until after procedure.  HOLD Lasix (furosemide) morning of procedure  NO MISSED DOSES OF XARELTO  Home same day with   CANNOT STOP XARELTO x 4 weeks after successful cardioversion ... Make sure IR knows when they schedule your biopsy    2.  STOP hydrochlorothiazide and restart furosemide 20 mg daily. Continue potassium chloride 20 mEq twice daily. This should help BP and swelling/breathing

## 2023-09-08 NOTE — PROGRESS NOTES
New IP (Interventional Pulmonology) referral rec'd.  Chart reviewed.      New Patient: Interventional Pulmonary (Lung nodule) Nurse Navigator Note    Referring provider:   Ranjan Courtney MD [facility] [phone]       Referred to (specialty): Interventional Pulmonary (Lung nodule)    Requested provider (if applicable): n/a    Date Referral Received: 9/8/2023    Evaluation for :  patient with growing pulm nodules, radiology has given conflicting advice (biopsy vs wait and re-image), needs more definitive plan    Clinical History (per Nurse review of records provided):    **BOOK MARKED**    9/1/23CT Chest w/o Contrast     Records Location (Care Everywhere, Media, etc.): [facility]     Records Needed: none    Additional testing needed prior to consult: NONE

## 2023-09-08 NOTE — TELEPHONE ENCOUNTER
RECORDS STATUS - ALL OTHER DIAGNOSIS      RECORDS RECEIVED FROM: [facility] - Internal Records   DATE RECEIVED: 9/8

## 2023-09-12 ENCOUNTER — TELEPHONE (OUTPATIENT)
Dept: CARDIOLOGY | Facility: CLINIC | Age: 73
End: 2023-09-12
Payer: COMMERCIAL

## 2023-09-12 ENCOUNTER — ANESTHESIA EVENT (OUTPATIENT)
Dept: SURGERY | Facility: CLINIC | Age: 73
End: 2023-09-12
Payer: COMMERCIAL

## 2023-09-12 ASSESSMENT — LIFESTYLE VARIABLES: TOBACCO_USE: 1

## 2023-09-12 ASSESSMENT — ENCOUNTER SYMPTOMS
DYSRHYTHMIAS: 1
ORTHOPNEA: 0

## 2023-09-12 NOTE — TELEPHONE ENCOUNTER
Spoke to pt who is aware of check in time of 0630 and he is to be npo after midnight and may have clears up to 2 hours before arrival. Pt is to hold lasix the morning of procedure and take all other medications with his clear liquids/water. Pt wife will be coming with pt and will be  on discharge. Pt made aware at this time there is not follow up made, but decide this after cardioversion completed. Pt has no questions at this time. [email or web address]

## 2023-09-13 ENCOUNTER — HOSPITAL ENCOUNTER (OUTPATIENT)
Facility: CLINIC | Age: 73
Discharge: HOME | End: 2023-09-13
Admitting: INTERNAL MEDICINE
Payer: COMMERCIAL

## 2023-09-13 ENCOUNTER — ANESTHESIA (OUTPATIENT)
Dept: SURGERY | Facility: CLINIC | Age: 73
End: 2023-09-13
Payer: COMMERCIAL

## 2023-09-13 ENCOUNTER — MYC MEDICAL ADVICE (OUTPATIENT)
Dept: CARDIOLOGY | Facility: CLINIC | Age: 73
End: 2023-09-13

## 2023-09-13 ENCOUNTER — HOSPITAL ENCOUNTER (OUTPATIENT)
Dept: MEDSURG UNIT | Facility: CLINIC | Age: 73
Discharge: HOME | End: 2023-09-13
Attending: PHYSICIAN ASSISTANT
Payer: COMMERCIAL

## 2023-09-13 VITALS
SYSTOLIC BLOOD PRESSURE: 126 MMHG | HEIGHT: 68 IN | OXYGEN SATURATION: 97 % | BODY MASS INDEX: 43.19 KG/M2 | HEART RATE: 75 BPM | RESPIRATION RATE: 15 BRPM | TEMPERATURE: 98 F | WEIGHT: 285 LBS | DIASTOLIC BLOOD PRESSURE: 113 MMHG

## 2023-09-13 DIAGNOSIS — I48.19 PERSISTENT ATRIAL FIBRILLATION (H): ICD-10-CM

## 2023-09-13 DIAGNOSIS — I48.19 PERSISTENT ATRIAL FIBRILLATION (H): Primary | ICD-10-CM

## 2023-09-13 LAB
MAGNESIUM SERPL-MCNC: 1.7 MG/DL (ref 1.7–2.3)
POTASSIUM SERPL-SCNC: 4.1 MMOL/L (ref 3.4–5.3)

## 2023-09-13 PROCEDURE — 93010 ELECTROCARDIOGRAM REPORT: CPT | Performed by: INTERNAL MEDICINE

## 2023-09-13 PROCEDURE — 250N000011 HC RX IP 250 OP 636: Mod: JZ

## 2023-09-13 PROCEDURE — 36591 DRAW BLOOD OFF VENOUS DEVICE: CPT

## 2023-09-13 PROCEDURE — 250N000011 HC RX IP 250 OP 636: Performed by: NURSE ANESTHETIST, CERTIFIED REGISTERED

## 2023-09-13 PROCEDURE — 370N000017 HC ANESTHESIA TECHNICAL FEE, PER MIN

## 2023-09-13 PROCEDURE — 93005 ELECTROCARDIOGRAM TRACING: CPT

## 2023-09-13 PROCEDURE — 999N000054 HC STATISTIC EKG NON-CHARGEABLE

## 2023-09-13 PROCEDURE — 92960 CARDIOVERSION ELECTRIC EXT: CPT | Performed by: INTERNAL MEDICINE

## 2023-09-13 PROCEDURE — 258N000003 HC RX IP 258 OP 636

## 2023-09-13 PROCEDURE — 999N000010 HC STATISTIC ANES STAT CODE-CRNA PER MINUTE

## 2023-09-13 PROCEDURE — 83735 ASSAY OF MAGNESIUM: CPT

## 2023-09-13 PROCEDURE — 92960 CARDIOVERSION ELECTRIC EXT: CPT

## 2023-09-13 PROCEDURE — 84132 ASSAY OF SERUM POTASSIUM: CPT

## 2023-09-13 PROCEDURE — 36415 COLL VENOUS BLD VENIPUNCTURE: CPT

## 2023-09-13 PROCEDURE — 999N000184 HC STATISTIC TELEMETRY

## 2023-09-13 RX ORDER — FLUMAZENIL 0.1 MG/ML
0.2 INJECTION, SOLUTION INTRAVENOUS
Status: DISCONTINUED | OUTPATIENT
Start: 2023-09-13 | End: 2023-09-13 | Stop reason: HOSPADM

## 2023-09-13 RX ORDER — AMIODARONE HYDROCHLORIDE 200 MG/1
TABLET ORAL
Qty: 44 TABLET | Refills: 3 | Status: ON HOLD | OUTPATIENT
Start: 2023-09-13 | End: 2023-12-01

## 2023-09-13 RX ORDER — NALOXONE HYDROCHLORIDE 0.4 MG/ML
0.4 INJECTION, SOLUTION INTRAMUSCULAR; INTRAVENOUS; SUBCUTANEOUS
Status: DISCONTINUED | OUTPATIENT
Start: 2023-09-13 | End: 2023-09-13 | Stop reason: HOSPADM

## 2023-09-13 RX ORDER — NALOXONE HYDROCHLORIDE 0.4 MG/ML
0.2 INJECTION, SOLUTION INTRAMUSCULAR; INTRAVENOUS; SUBCUTANEOUS
Status: DISCONTINUED | OUTPATIENT
Start: 2023-09-13 | End: 2023-09-13 | Stop reason: HOSPADM

## 2023-09-13 RX ORDER — POTASSIUM CHLORIDE 1500 MG/1
40 TABLET, EXTENDED RELEASE ORAL
Status: DISCONTINUED | OUTPATIENT
Start: 2023-09-13 | End: 2023-09-13 | Stop reason: HOSPADM

## 2023-09-13 RX ORDER — PROPOFOL 10 MG/ML
INJECTION, EMULSION INTRAVENOUS PRN
Status: DISCONTINUED | OUTPATIENT
Start: 2023-09-13 | End: 2023-09-13

## 2023-09-13 RX ORDER — ATROPINE SULFATE 0.1 MG/ML
.5-1 INJECTION INTRAVENOUS
Status: DISCONTINUED | OUTPATIENT
Start: 2023-09-13 | End: 2023-09-13 | Stop reason: HOSPADM

## 2023-09-13 RX ORDER — POTASSIUM CHLORIDE 1500 MG/1
20 TABLET, EXTENDED RELEASE ORAL
Status: DISCONTINUED | OUTPATIENT
Start: 2023-09-13 | End: 2023-09-13 | Stop reason: HOSPADM

## 2023-09-13 RX ORDER — MAGNESIUM SULFATE HEPTAHYDRATE 40 MG/ML
2 INJECTION, SOLUTION INTRAVENOUS
Status: DISCONTINUED | OUTPATIENT
Start: 2023-09-13 | End: 2023-09-13 | Stop reason: HOSPADM

## 2023-09-13 RX ORDER — SODIUM CHLORIDE 9 MG/ML
INJECTION, SOLUTION INTRAVENOUS CONTINUOUS
Status: DISCONTINUED | OUTPATIENT
Start: 2023-09-13 | End: 2023-09-13 | Stop reason: HOSPADM

## 2023-09-13 RX ADMIN — MAGNESIUM SULFATE HEPTAHYDRATE 2 G: 40 INJECTION, SOLUTION INTRAVENOUS at 07:57

## 2023-09-13 RX ADMIN — PROPOFOL 170 MG: 10 INJECTION, EMULSION INTRAVENOUS at 09:01

## 2023-09-13 RX ADMIN — SODIUM CHLORIDE: 9 INJECTION, SOLUTION INTRAVENOUS at 07:04

## 2023-09-13 ASSESSMENT — ACTIVITIES OF DAILY LIVING (ADL)
ADLS_ACUITY_SCORE: 35
ADLS_ACUITY_SCORE: 35

## 2023-09-13 NOTE — ANESTHESIA PREPROCEDURE EVALUATION
Anesthesia Pre-Procedure Evaluation    Patient: Mike Schultz   MRN: [id number] : 1950        Procedure : Procedure(s):  Anesthesia cardioversion          Past Medical History:   Diagnosis Date    Cancer (H)     basal nose    H/O ETOH abuse     Herpes simplex without mention of complication     Hyperlipidemia     Obesity     Persistent atrial fibrillation (H)     persistent, DCCV 2017, ablation 17    Sleep apnea     CPAP    Tobacco use disorder     dced 2004      Past Surgical History:   Procedure Laterality Date    ABLATION OF DYSRHYTHMIC FOCUS  , 2019    Procedure: EP Ablation Focal AFIB;  Surgeon: Fady Day MD;  Location:  HEART CARDIAC CATH LAB    ACHILLES TENDON SURGERY      ANESTHESIA CARDIOVERSION N/A 2019    Procedure: ANESTHESIA CARDIOVERSION (CONNER);  Surgeon: GENERIC ANESTHESIA PROVIDER;  Location:  OR    ANESTHESIA CARDIOVERSION N/A 2020    Procedure: ANESTHESIA, FOR CARDIOVERSION (dr campbell);  Surgeon: GENERIC ANESTHESIA PROVIDER;  Location:  OR    ANESTHESIA CARDIOVERSION N/A 2022    Procedure: CARDIOVERSION;  Surgeon: GENERIC ANESTHESIA PROVIDER;  Location:  OR    BASAL CELL CARCINOMA EXCISION Right 2009    nose, took cartilage from inner ear.    CARDIOVERSION  2019    cardioversion for atr fib    COLONOSCOPY      COLONOSCOPY N/A 3/22/2023    Procedure: COLONOSCOPY, FLEXIBLE, WITH LESION REMOVAL USING SNARE;  Surgeon: Anne Lucero MD;  Location:  GI    EP ABLATION FOCAL AFIB N/A 2019    Procedure: EP Ablation Focal AFIB;  Surgeon: Fady Day MD;  Location:  HEART CARDIAC CATH LAB    HERNIA REPAIR, UMBILICAL  2012    Procedure: HERNIORRHAPHY UMBILICAL;  UMBILICAL HERNIA REPAIR;  Surgeon: Ra Crocker MD;  Location: [facility]    HERNIORRHAPHY UMBILICAL  2012    Procedure: HERNIORRHAPHY UMBILICAL;  UMBILICAL HERNIA REPAIR;  Surgeon: Ra Crocker MD;  Location: [facility]    ORTHOPEDIC SURGERY       ORTHOPEDIC SURGERY      OTHER SURGICAL HISTORY Right 2017    total KNEE ARTHROSCOPY     TOTAL KNEE ARTHROPLASTY Left 2018    ZZC NONSPECIFIC PROCEDURE      achilles tendon    ZZC NONSPECIFIC PROCEDURE      knees, arthroscopy    ZZC NONSPECIFIC PROCEDURE      basal cell skin ca, nose    ZZC NONSPECIFIC PROCEDURE      flex sig; colonoscopy due 3/2008    ZZC NONSPECIFIC PROCEDURE      cardioversion for atr fib    ZZC TOTAL KNEE ARTHROPLASTY Left 2018    Dr. Malick Saurez      Allergies   Allergen Reactions    No Known Drug Allergy       Social History     Tobacco Use    Smoking status: Former     Packs/day: 1.00     Years: 10.00     Pack years: 10.00     Types: Cigarettes     Start date:      Quit date: 2011     Years since quittin.0    Smokeless tobacco: Never   Substance Use Topics    Alcohol use: No      Wt Readings from Last 1 Encounters:   23 129.7 kg (286 lb)        Anesthesia Evaluation   Pt has had prior anesthetic.         ROS/MED HX  ENT/Pulmonary:     (+) sleep apnea, doesn't use CPAP,         allergic rhinitis,     tobacco use, Past use,                      Neurologic: Comment: bilat CTS, Bell's palsy       Cardiovascular: Comment: Narrative & Impression  [id number]  [facility]  [id number]  [id number]^BAYRON^JOANIE^SHYANN     [facility]  [facility]  Echocardiography Laboratory  [address]  [address]  [place] [address]  Phone [phone]  Fax [phone]     Name: TOO HOWARD  MRN: [id number]  : 1950  Study Date: 2023 07:48 AM  Age: 72 yrs  Gender: Male  Patient Location: [facility]  Reason For Study: Nonrheumatic aortic valve stenosis  Ordering Physician: JOANIE VERMA  Referring Physician: Ranjan Courtney MD  Performed By: Henrique Raza     BSA: 2.4 m2  Height: 68 in  Weight: 279 lb  HR: 76  BP: 162/96  mmHg  ______________________________________________________________________________  Procedure  Complete Echo Adult.     ______________________________________________________________________________  Interpretation Summary     1. The left ventricle is borderline dilated. Biplane LVEF is 49%. There is  borderline-mild global hypokinesia of the left ventricle.  2. The right ventricle is normal in structure, function and size.  3. Moderate valvular aortic stenosis. Mean 16mmHg, Vmax 2.7m/s, DI 0.31.  4. The ascending aorta is Mildly dilated. 4.2cm.     Echo 10/2022 showed EF 50-55%, AS with mean 18mmHg, Vmax 2.9m/s, DI 0.36.      (+) Dyslipidemia hypertension- -  CAD - past MI - -   Taking blood thinners        DELEON.             dysrhythmias, a-fib and a-flutter,  valvular problems/murmurs type: AS       (-) CHF, orthopnea/PND and syncope   METS/Exercise Tolerance:     Hematologic:       Musculoskeletal:       GI/Hepatic:    (-) GERD   Renal/Genitourinary:       Endo:     (+)               Obesity,       Psychiatric/Substance Use:     (+)   alcohol abuse      Infectious Disease:       Malignancy:       Other:            Physical Exam    Airway  airway exam normal      Mallampati: III   TM distance: > 3 FB   Neck ROM: full   Mouth opening: > 3 cm    Respiratory Devices and Support         Dental       (+) Modest Abnormalities - crowns, retainers, 1 or 2 missing teeth      Cardiovascular   cardiovascular exam normal          Pulmonary   pulmonary exam normal                OUTSIDE LABS:  CBC:   Lab Results   Component Value Date    WBC 7.5 12/17/2022    WBC 7.7 02/28/2022    HGB 15.5 12/17/2022    HGB 16.7 08/22/2022    HCT 46.7 12/17/2022    HCT 49 08/22/2022     12/17/2022     02/28/2022     BMP:   Lab Results   Component Value Date     09/06/2023     07/27/2023    POTASSIUM 3.8 09/06/2023    POTASSIUM 4.1 07/27/2023    CHLORIDE 99 09/06/2023    CHLORIDE 105 07/27/2023    CO2 33 (H)  09/06/2023    CO2 27 07/27/2023    BUN 19.2 09/06/2023    BUN 21.1 07/27/2023    CR 1.05 09/06/2023    CR 0.97 07/27/2023     (H) 09/06/2023     (H) 07/27/2023     COAGS:   Lab Results   Component Value Date    INR 2.04 (H) 02/26/2019     POC: No results found for: BGM, HCG, HCGS  HEPATIC:   Lab Results   Component Value Date    ALBUMIN 3.5 12/17/2022    PROTTOTAL 6.9 12/17/2022    ALT 32 12/17/2022    AST 34 12/17/2022    ALKPHOS 69 12/17/2022    BILITOTAL 0.7 12/17/2022     OTHER:   Lab Results   Component Value Date    A1C 5.7 (H) 02/25/2019    ASHELY 8.6 (L) 09/06/2023    PHOS 3.1 09/21/2020    MAG 1.8 08/22/2022    TSH 2.53 01/17/2023    T4 1.06 10/18/2022    CRP <2.9 01/08/2021    SED 10 12/17/2022       Anesthesia Plan    ASA Status:  3    NPO Status:  NPO Appropriate    Anesthesia Type: MAC.              Consents    Anesthesia Plan(s) and associated risks, benefits, and realistic alternatives discussed. Questions answered and patient/representative(s) expressed understanding.     - Discussed:     - Discussed with:  Patient      - Specific Concerns: Specific risks discussed (but not limited to): Possibility of intraoperative awareness..           Postoperative Care            Comments:                Johnny Givens MD

## 2023-09-13 NOTE — PROGRESS NOTES
Patient instructed to follow up with the heart clinic regarding next steps given that he is still in Atrial fibrillation.  Patient stated that he is agreeable to starting amiodarone and has some at home.  Instructed patient to not start until the heart clinic has instructed on usage.

## 2023-09-13 NOTE — ANESTHESIA CARE TRANSFER NOTE
Patient: Mike Schultz    Procedure: Procedure(s):  Anesthesia cardioversion  Cardioversion External    Diagnosis: Atrial fibrillation (H) [I48.91]  Diagnosis Additional Information: No value filed.    Anesthesia Type:   MAC     Note:    Oropharynx: oropharynx clear of all foreign objects and spontaneously breathing  Level of Consciousness: awake  Oxygen Supplementation: face mask  Level of Supplemental Oxygen (L/min / FiO2): 10  Independent Airway: airway patency satisfactory and stable  Dentition: dentition unchanged  Vital Signs Stable: post-procedure vital signs reviewed and stable  Report to RN Given: handoff report given  Destination: [facility] Suites.  Comments: Pt awake and able to verbalize needs. All monitors on and audible. VSS. Spontaneous respiration without difficulty. o2 sats 100% on 10L o2 per simple facemask. Pt denies pain. Report given to PACU RN.          Vitals:  Vitals Value Taken Time   BP     Temp     Pulse     Resp     SpO2         Electronically Signed By: AYAN Medley CRNA  September 13, 2023  9:24 AM

## 2023-09-13 NOTE — PROCEDURES
[facility]    Procedure: Cardioversion    Date/Time: 9/13/2023 8:51 AM    Performed by: Yasir Pérez MD  Authorized by: Natalie Campos PA-C      UNIVERSAL PROTOCOL   Site Marked: NA  Prior Images Obtained and Reviewed:  Yes  Required items: Required blood products, implants, devices and special equipment available    Patient identity confirmed:  Verbally with patient, arm band, provided demographic data and hospital-assigned identification number  Patient was reevaluated immediately before administering moderate or deep sedation or anesthesia  Confirmation Checklist:  Patient's identity using two indicators, relevant allergies, procedure was appropriate and matched the consent or emergent situation and correct equipment/implants were available  Time out: Immediately prior to the procedure a time out was called    Universal Protocol: the Joint Commission Universal Protocol was followed    Preparation: Patient was prepped and draped in usual sterile fashion       ANESTHESIA    Anesthesia was administered and monitored by anesthesiology.  See anesthesia documentation for details.    SEDATION    Patient Sedated: No      PROCEDURE DETAILS  Cardioversion basis: elective  Pre-procedure rhythm: atrial fibrillation  Patient position: patient was placed in a supine position  Chest area: chest area exposed  Electrodes: pads  Electrodes placed: anterior-posterior  Number of attempts: 3    Details of Attempts:  3 attempts at synchronized DC cardioversion with 200 J each.  Unsuccessful in restoring sinus rhythm.  Post-procedure rhythm: atrial fibrillation      PROCEDURE  Describe Procedure: Informed consent obtained for elective DC cardioversion of atrial fibrillation. Rhythm confirmed with pre-procedure ECG.  Labs, echocardiogram reviewed.  Patient is chronically maintained on uninterrupted rivaroxaban anticoagulation.      3 attempts at synchronized DC cardioversion with 200 J each.   Unsuccessful in restoring sinus rhythm.  Sedation by anesthesia team.      RECOMMENDATIONS:  1.  Continue uninterrupted rivaroxaban anticoagulation.  2.  Follow-up with primary cardiology team.     Dr. BRAXTON Pérez MD [facility]  Cardiology.    Patient Tolerance:  Patient tolerated the procedure well with no immediate complications  Length of time physician/provider present for 1:1 monitoring during sedation: 0

## 2023-09-13 NOTE — PRE-PROCEDURE
GENERAL PRE-PROCEDURE:   Date/Time:  9/13/2023 8:35 AM    Written consent obtained?: Yes    Risks and benefits: Risks, benefits and alternatives were discussed    Consent given by:  Patient  Patient states understanding of procedure being performed: Yes    Patient's understanding of procedure matches consent: Yes    Procedure consent matches procedure scheduled: Yes    Expected level of sedation:  Moderate  Appropriately NPO:  Yes  ASA Class:  2  Mallampati  :  Grade 2- soft palate, base of uvula, tonsillar pillars, and portion of posterior pharyngeal wall visible  Lungs:  Lungs clear with good breath sounds bilaterally  Heart:  Normal heart sounds and rate, a-fib and systolic murmur  History & Physical reviewed:  History and physical reviewed and no updates needed  Statement of review:  I have reviewed the lab findings, diagnostic data, medications, and the plan for sedation

## 2023-09-13 NOTE — DISCHARGE INSTRUCTIONS
Cardioversion Discharge Instructions    After you go home:       For 24 hours - due to the sedation you received:    Have an adult stay with you for 24 hours.   Relax and take it easy.  Do NOT make any important or legal decisions.  Do NOT drive or operate machines at home or at work.  Do NOT drink alcohol.    Diet:    Start with clear liquids and progress to your normal diet as you feel able.    Medicines:    Take your medications, including blood thinners, unless your provider tells you not to.  If you have stopped any medications, check with your provider about when to restart them.    Follow Up Appointments:    Follow up with your cardiologist at [facility] of patient preference as instructed.  Follow up with your primary care provider as needed.    Post cardioversion:    The skin on your chest or back may feel tender for 48 hours.  If your skin is tender, you may:    Use a cold pack on the site. Never use ice directly on your skin. Use the cold pack for 20 minutes. Remove it for at least 30 minutes before re-using.  Apply 1% hydrocortisone cream to the skin (sold at drug stores)  Take Advil (Ibuprofen) or Tylenol (Acetaminophen) per your provider's recommendations.      Call your provider if you have:    Weakness, dizziness, lightheadedness, or fainting.  Shortness of breath.  Irregular heartbeat, feelings of your heart fluttering or beating fast, hard or palpitations.   More than minor skin discomfort or redness where the cardioversion pads were placed.  Questions or concerns.      Call 911 if you have:    Pain in your chest, arm, shoulder, neck, or upper back.  You have problems speaking or seeing.  Weakness in your arm or leg.  You are unable to move your arm or leg.  You have uncontrolled bleeding.         [facility] [place]:    [phone] [facility] (7 days a week)

## 2023-09-13 NOTE — PROGRESS NOTES
Care Suites Discharge Nursing Note    Patient Information  Name: Mike Schultz  Age: 72 year old    Discharge Education:  Discharge instructions reviewed: Yes  Additional education/resources provided: yes  Patient/patient representative verbalizes understanding: Yes  Patient discharging on new medications: No  Medication education completed: N/A    Discharge Plans:   Discharge location: home  Discharge ride contacted: Yes  Approximate discharge time: 1000    Discharge Criteria:  Discharge criteria met and vital signs stable: Yes    Patient Belongs:  Patient belongings returned to patient: Yes    Taryn Mock RN

## 2023-09-13 NOTE — TELEPHONE ENCOUNTER
Come[facility] message sent    Dr. Day agrees that amiodarone/repeat cardioversion is reasonable as this was likely triggered by alcohol intake. If you start having AFib without it being triggered by something, we can consider repeat ablation     I'll send amiodarone 200 mg tablets in ... Let's LOAD IT by starting 2 pills daily (400 mg) x 1 week, then back off to 1 pill daily (200 mg). I'll message our  looking to get repeat cardioversion done in the next 2-3 weeks (want to give amiodarone a chance to load up in system).     [facility] - [facility]    Amiodarone 400 mg daily x 1 week,then 200 mg daily sent to pharmacy  DCCV ordered.    On AM of procedure, HOLD amiodarone given h/o bradycardia, Lasix.  NO MISSED DOSES of XARELTO    September 13, 2023 at 5:17 PM

## 2023-09-13 NOTE — ANESTHESIA POSTPROCEDURE EVALUATION
Patient: Mike Schultz    Procedure: Procedure(s):  Anesthesia cardioversion       Anesthesia Type:  MAC    Note:  Disposition: Outpatient   Postop Pain Control: Uneventful            Sign Out: Well controlled pain   PONV: No   Neuro/Psych: Uneventful            Sign Out: Acceptable/Baseline neuro status   Airway/Respiratory: Uneventful            Sign Out: Acceptable/Baseline resp. status   CV/Hemodynamics: Uneventful            Sign Out: Acceptable CV status   Other NRE: NONE   DID A NON-ROUTINE EVENT OCCUR? No           Last vitals:  Vitals:    09/13/23 0915 09/13/23 0930 09/13/23 0945   BP: 133/85 128/78 (!) 126/113   Pulse: 73 69 75   Resp: 12 (!) 7 15   Temp:      SpO2: 96% 97% 97%       Electronically Signed By: Johnny Givens MD  September 13, 2023  10:30 AM

## 2023-09-14 ENCOUNTER — PRE VISIT (OUTPATIENT)
Dept: ONCOLOGY | Facility: CLINIC | Age: 73
End: 2023-09-14
Payer: COMMERCIAL

## 2023-09-14 ENCOUNTER — VIRTUAL VISIT (OUTPATIENT)
Dept: ONCOLOGY | Facility: CLINIC | Age: 73
End: 2023-09-14
Attending: INTERNAL MEDICINE
Payer: COMMERCIAL

## 2023-09-14 VITALS — WEIGHT: 285 LBS | HEIGHT: 68 IN | BODY MASS INDEX: 43.19 KG/M2

## 2023-09-14 DIAGNOSIS — R91.8 LUNG MASS: ICD-10-CM

## 2023-09-14 DIAGNOSIS — R91.8 MULTIPLE LUNG NODULES: ICD-10-CM

## 2023-09-14 PROCEDURE — 99204 OFFICE O/P NEW MOD 45 MIN: CPT | Mod: VID | Performed by: INTERNAL MEDICINE

## 2023-09-14 ASSESSMENT — PAIN SCALES - GENERAL: PAINLEVEL: NO PAIN (0)

## 2023-09-14 NOTE — PATIENT INSTRUCTIONS
You will be hearing from the company [employer] to do the blood draw  We will schedule a follow up in 2-3 months with a CT and office visit

## 2023-09-14 NOTE — LETTER
9/14/2023         RE: Mike Schultz  [address] [address]        Dear Colleague,    Thank you for referring your patient, Mike Schultz, to the [facility]. Please see a copy of my visit note below.    Is the patient currently in the state of [place]? YES    Visit mode:VIDEO    If the visit is dropped, the patient can be reconnected by: VIDEO VISIT: Text to cell phone:   Telephone Information:   Mobile [phone]       Will anyone else be joining the visit? NO  (If patient encounters technical issues they should call [phone] :[phone])    How would you like to obtain your AVS? MyChart    Are changes needed to the allergy or medication list? Pt stated no med changes    Reason for visit: Consult    Keren WILKINSON       Video-Visit Details    Type of service:  Video Visit    Video Visit Start Time:11:32 AM    Video End Time: 11:54    Originating Location (pt. Location): Home      Distant Location (provider location):  Off-site     Platform used for Video Visit: [facility] Initial Visit  Joined the call at 9/14/2023, 11:31:09 am.  Left the call at 9/14/2023, 11:54:13 am.  Reason for Visit  Mike Schultz is a 72 year old male who is referred by Courtney for lung nodules  Pulmonary HPI    - He does experience dyspnea he attributes to paroxysmal atrial fib   - aorta screening discovered incidental lung nodules  Back in 2022 he had a CT for chest pain and there were no nodules on that one  - no episodes of pneumonia or bad cold    Other active medical problems include:   - atrial fibrillation on Xarelto, cardioversion attempted yesterday  - Bell's Palsy on Right last year with some residual symptoms      Exposure history: Denies asbestos or radon exposure   TB risk factors: No  Prior Imaging:Yes  Constitutional Symptoms: No  Personal history of cancer:Yes  Up to date on age-appropriate cancer  screening:No    ROS Pulmonary  Dyspnea: Yes, Cough: No, Chest pain: No, Wheezing: No, Sputum Production: No, Hemoptysis: No  A complete ROS was otherwise negative except as noted in the HPI.  The patient was seen and examined by Jeanette Mckeon MD   Current Outpatient Medications   Medication     allopurinol (ZYLOPRIM) 300 MG tablet     amiodarone (PACERONE) 200 MG tablet     cetirizine (ZYRTEC) 10 MG tablet     finasteride (PROPECIA) 1 MG tablet     furosemide (LASIX) 20 MG tablet     losartan (COZAAR) 100 MG tablet     potassium chloride ER (KLOR-CON M) 20 MEQ CR tablet     rivaroxaban ANTICOAGULANT (XARELTO ANTICOAGULANT) 20 MG TABS tablet     No current facility-administered medications for this visit.     Allergies   Allergen Reactions     No Known Drug Allergy      Social History     Socioeconomic History     Marital status: Single     Spouse name: Not on file     Number of children: Not on file     Years of education: Not on file     Highest education level: Not on file   Occupational History     Not on file   Tobacco Use     Smoking status: Former     Packs/day: 1.00     Years: 10.00     Pack years: 10.00     Types: Cigarettes     Start date:      Quit date: 2011     Years since quittin.0     Smokeless tobacco: Never   Vaping Use     Vaping Use: Never used   Substance and Sexual Activity     Alcohol use: No     Drug use: No     Sexual activity: Yes     Partners: Female   Other Topics Concern     Parent/sibling w/ CABG, MI or angioplasty before 65F 55M? Not Asked   Social History Narrative     Not on file     Social Determinants of Health     Financial Resource Strain: Not on file   Food Insecurity: Not on file   Transportation Needs: Not on file   Physical Activity: Not on file   Stress: Not on file   Social Connections: Not on file   Intimate Partner Violence: Not on file   Housing Stability: Not on file     Past Medical History:   Diagnosis Date     Cancer (H)     basal nose     H/O ETOH  abuse      Herpes simplex without mention of complication      Hyperlipidemia      Obesity      Persistent atrial fibrillation (H)     persistent, DCCV 12/2017, ablation 11/6/17     Sleep apnea     CPAP     Tobacco use disorder     dced 6/2004     Past Surgical History:   Procedure Laterality Date     ABLATION OF DYSRHYTHMIC FOCUS  2018, 04/12/2019    Procedure: EP Ablation Focal AFIB;  Surgeon: Fady Day MD;  Location:  HEART CARDIAC CATH LAB     ACHILLES TENDON SURGERY  1997     ANESTHESIA CARDIOVERSION N/A 2/26/2019    Procedure: ANESTHESIA CARDIOVERSION (CONNER);  Surgeon: GENERIC ANESTHESIA PROVIDER;  Location:  OR     ANESTHESIA CARDIOVERSION N/A 11/19/2020    Procedure: ANESTHESIA, FOR CARDIOVERSION (dr campbell);  Surgeon: GENERIC ANESTHESIA PROVIDER;  Location:  OR     ANESTHESIA CARDIOVERSION N/A 8/22/2022    Procedure: CARDIOVERSION;  Surgeon: GENERIC ANESTHESIA PROVIDER;  Location:  OR     ANESTHESIA CARDIOVERSION N/A 9/13/2023    Procedure: Anesthesia cardioversion;  Surgeon: GENERIC ANESTHESIA PROVIDER;  Location:  OR     BASAL CELL CARCINOMA EXCISION Right 2009    nose, took cartilage from inner ear.     CARDIOVERSION  2012, 2019    cardioversion for atr fib     COLONOSCOPY  2008     COLONOSCOPY N/A 3/22/2023    Procedure: COLONOSCOPY, FLEXIBLE, WITH LESION REMOVAL USING SNARE;  Surgeon: Anne Lucero MD;  Location:  GI     EP ABLATION FOCAL AFIB N/A 4/12/2019    Procedure: EP Ablation Focal AFIB;  Surgeon: Fady Day MD;  Location:  HEART CARDIAC CATH LAB     HERNIA REPAIR, UMBILICAL  08/20/2012    Procedure: HERNIORRHAPHY UMBILICAL;  UMBILICAL HERNIA REPAIR;  Surgeon: Ra Crocker MD;  Location: [facility]     HERNIORRHAPHY UMBILICAL  8/20/2012    Procedure: HERNIORRHAPHY UMBILICAL;  UMBILICAL HERNIA REPAIR;  Surgeon: Ra Crocker MD;  Location: [facility]     ORTHOPEDIC SURGERY       ORTHOPEDIC SURGERY       OTHER SURGICAL HISTORY Right 03/2017    total KNEE  "ARTHROSCOPY      TOTAL KNEE ARTHROPLASTY Left 2018     Z NONSPECIFIC PROCEDURE      achilles tendon     Z NONSPECIFIC PROCEDURE      knees, arthroscopy     ZZC NONSPECIFIC PROCEDURE      basal cell skin ca, nose     ZZC NONSPECIFIC PROCEDURE      flex sig; colonoscopy due 3/2008     ZZ NONSPECIFIC PROCEDURE      cardioversion for atr fib     ZZC TOTAL KNEE ARTHROPLASTY Left 2018    Dr. Malick Suarez     Family History   Problem Relation Age of Onset     Family History Negative Mother      Heart Disease Father         decesased at 42 - MI     Heart Disease Sister          MI     Lipids Sister      Lipids Sister      No Known Problems Sister      Heart Disease Brother          MI     Heart Disease Brother         CABG AT 49       Exam:   Ht 1.727 m (5' 8\")   Wt 129.3 kg (285 lb)   BMI 43.33 kg/m    GENERAL APPEARANCE: Well developed, well nourished, alert, and in no apparent distress.  PSYCH: mentation appears normal. and affect normal/bright  Results:  - My interpretation of the images relevant for this visit includes: CT chest imaging reviewed Dec 2022, Aug 2023, 2023 multiple nodules in the  base of both lungs, some elongated and irregular, some more round. Largest is about 2x1 cm  - My interpretation of the PFT's relevant for this visit includes: None       Assessment and plan: Brayden is a 71 yo male with incidental lung nodules but he probably meets lung cancer screening criteria.   Lung nodule - seen on chest CT, not seen in , no significant change between Aug and Sept, unusual in appearance, no respiratory symptoms. Difficult to biopsy due to location plus he is dealing with an atrial fibrillation situation and planning another cardioversion in a few weeks   Considering patient with high risk for lung cancer, recommend follow up CT in  2 months per [place]abner 2017. Nodify blood test ordered via portal    If Nodify results reclassifies probability of cancer as " lower risk/likely benign, recommend follow up CT in 3 months. If unable to lower pretest probability, will proceed with additional evaluation      Physician assessed risk of malignancy, based on patient and nodule features e.g. Owens lung nodule calculator ( 20.6 %)    Clinical Characteristics  Diagnosis (select one):multiple lung nodules  Nodule diameter: 15  Nodule located in upper lobe: no  Nodule spiculated: no  Does the patient have a history of cancer?  (select one) No history of cancer    Prior to receiving results, which test being considered? Follow-up CT           Again, thank you for allowing me to participate in the care of your patient.        Sincerely,        Jeanette Mckeon MD

## 2023-09-14 NOTE — PROGRESS NOTES
[facility] Initial Visit  Joined the call at 9/14/2023, 11:31:09 am.  Left the call at 9/14/2023, 11:54:13 am.  Reason for Visit  Mike Schultz is a 72 year old male who is referred by Courtney for lung nodules  Pulmonary HPI    - He does experience dyspnea he attributes to paroxysmal atrial fib   - aorta screening discovered incidental lung nodules  Back in 2022 he had a CT for chest pain and there were no nodules on that one  - no episodes of pneumonia or bad cold    Other active medical problems include:   - atrial fibrillation on Xarelto, cardioversion attempted yesterday  - Bell's Palsy on Right last year with some residual symptoms      Exposure history: Denies asbestos or radon exposure   TB risk factors: No  Prior Imaging:Yes  Constitutional Symptoms: No  Personal history of cancer:Yes  Up to date on age-appropriate cancer screening:No    ROS Pulmonary  Dyspnea: Yes, Cough: No, Chest pain: No, Wheezing: No, Sputum Production: No, Hemoptysis: No  A complete ROS was otherwise negative except as noted in the HPI.  The patient was seen and examined by Jeanette Mckeon MD   Current Outpatient Medications   Medication    allopurinol (ZYLOPRIM) 300 MG tablet    amiodarone (PACERONE) 200 MG tablet    cetirizine (ZYRTEC) 10 MG tablet    finasteride (PROPECIA) 1 MG tablet    furosemide (LASIX) 20 MG tablet    losartan (COZAAR) 100 MG tablet    potassium chloride ER (KLOR-CON M) 20 MEQ CR tablet    rivaroxaban ANTICOAGULANT (XARELTO ANTICOAGULANT) 20 MG TABS tablet     No current facility-administered medications for this visit.     Allergies   Allergen Reactions    No Known Drug Allergy      Social History     Socioeconomic History    Marital status: Single     Spouse name: Not on file    Number of children: Not on file    Years of education: Not on file    Highest education level: Not on file   Occupational History    Not on file   Tobacco Use    Smoking status: Former      Packs/day: 1.00     Years: 10.00     Pack years: 10.00     Types: Cigarettes     Start date:      Quit date: 2011     Years since quittin.0    Smokeless tobacco: Never   Vaping Use    Vaping Use: Never used   Substance and Sexual Activity    Alcohol use: No    Drug use: No    Sexual activity: Yes     Partners: Female   Other Topics Concern    Parent/sibling w/ CABG, MI or angioplasty before 65F 55M? Not Asked   Social History Narrative    Not on file     Social Determinants of Health     Financial Resource Strain: Not on file   Food Insecurity: Not on file   Transportation Needs: Not on file   Physical Activity: Not on file   Stress: Not on file   Social Connections: Not on file   Intimate Partner Violence: Not on file   Housing Stability: Not on file     Past Medical History:   Diagnosis Date    Cancer (H)     basal nose    H/O ETOH abuse     Herpes simplex without mention of complication     Hyperlipidemia     Obesity     Persistent atrial fibrillation (H)     persistent, DCCV 2017, ablation 17    Sleep apnea     CPAP    Tobacco use disorder     dced 2004     Past Surgical History:   Procedure Laterality Date    ABLATION OF DYSRHYTHMIC FOCUS  , 2019    Procedure: EP Ablation Focal AFIB;  Surgeon: Fady Day MD;  Location:  HEART CARDIAC CATH LAB    ACHILLES TENDON SURGERY      ANESTHESIA CARDIOVERSION N/A 2019    Procedure: ANESTHESIA CARDIOVERSION (CONNER);  Surgeon: GENERIC ANESTHESIA PROVIDER;  Location:  OR    ANESTHESIA CARDIOVERSION N/A 2020    Procedure: ANESTHESIA, FOR CARDIOVERSION (dr campbell);  Surgeon: GENERIC ANESTHESIA PROVIDER;  Location:  OR    ANESTHESIA CARDIOVERSION N/A 2022    Procedure: CARDIOVERSION;  Surgeon: GENERIC ANESTHESIA PROVIDER;  Location:  OR    ANESTHESIA CARDIOVERSION N/A 2023    Procedure: Anesthesia cardioversion;  Surgeon: GENERIC ANESTHESIA PROVIDER;  Location:  OR    BASAL CELL CARCINOMA EXCISION Right  "    nose, took cartilage from inner ear.    CARDIOVERSION  2019    cardioversion for atr fib    COLONOSCOPY      COLONOSCOPY N/A 3/22/2023    Procedure: COLONOSCOPY, FLEXIBLE, WITH LESION REMOVAL USING SNARE;  Surgeon: Anne Lucero MD;  Location:  GI    EP ABLATION FOCAL AFIB N/A 2019    Procedure: EP Ablation Focal AFIB;  Surgeon: Fady Day MD;  Location:  HEART CARDIAC CATH LAB    HERNIA REPAIR, UMBILICAL  2012    Procedure: HERNIORRHAPHY UMBILICAL;  UMBILICAL HERNIA REPAIR;  Surgeon: Ra Crocker MD;  Location: [facility]    HERNIORRHAPHY UMBILICAL  2012    Procedure: HERNIORRHAPHY UMBILICAL;  UMBILICAL HERNIA REPAIR;  Surgeon: Ra Crocker MD;  Location: [facility]    ORTHOPEDIC SURGERY      ORTHOPEDIC SURGERY      OTHER SURGICAL HISTORY Right 2017    total KNEE ARTHROSCOPY     TOTAL KNEE ARTHROPLASTY Left 2018    ZZC NONSPECIFIC PROCEDURE  1997    achilles tendon    ZZC NONSPECIFIC PROCEDURE      knees, arthroscopy    ZZC NONSPECIFIC PROCEDURE      basal cell skin ca, nose    ZZC NONSPECIFIC PROCEDURE      flex sig; colonoscopy due 3/2008    ZZC NONSPECIFIC PROCEDURE      cardioversion for atr fib    ZZC TOTAL KNEE ARTHROPLASTY Left 2018    Dr. Malick Suarez     Family History   Problem Relation Age of Onset    Family History Negative Mother     Heart Disease Father         decesased at 42 - MI    Heart Disease Sister          MI    Lipids Sister     Lipids Sister     No Known Problems Sister     Heart Disease Brother          MI    Heart Disease Brother         CABG AT 49       Exam:   Ht 1.727 m (5' 8\")   Wt 129.3 kg (285 lb)   BMI 43.33 kg/m    GENERAL APPEARANCE: Well developed, well nourished, alert, and in no apparent distress.  PSYCH: mentation appears normal. and affect normal/bright  Results:  - My interpretation of the images relevant for this visit includes: CT chest imaging reviewed Dec 2022, Aug 2023, 2023 " multiple nodules in the  base of both lungs, some elongated and irregular, some more round. Largest is about 2x1 cm  - My interpretation of the PFT's relevant for this visit includes: None       Assessment and plan: Brayden is a 73 yo male with incidental lung nodules but he probably meets lung cancer screening criteria.   Lung nodule - seen on chest CT, not seen in 2022, no significant change between Aug and Sept, unusual in appearance, no respiratory symptoms. Difficult to biopsy due to location plus he is dealing with an atrial fibrillation situation and planning another cardioversion in a few weeks   Considering patient with high risk for lung cancer, recommend follow up CT in  2 months per [place] 2017. Nodify blood test ordered via portal    If Nodify results reclassifies probability of cancer as lower risk/likely benign, recommend follow up CT in 3 months. If unable to lower pretest probability, will proceed with additional evaluation      Physician assessed risk of malignancy, based on patient and nodule features e.g. [place] lung nodule calculator ( 20.6 %)    Clinical Characteristics  Diagnosis (select one):multiple lung nodules  Nodule diameter: 15  Nodule located in upper lobe: no  Nodule spiculated: no  Does the patient have a history of cancer?  (select one) No history of cancer    Prior to receiving results, which test being considered? Follow-up CT

## 2023-09-14 NOTE — LETTER
9/14/2023         RE: Mike Schultz  [address] [place] [address]        Dear Colleague,    Thank you for referring your patient, Mike Schultz, to the [facility]. Please see a copy of my visit note below.    Is the patient currently in the state of [place]? YES    Visit mode:VIDEO    If the visit is dropped, the patient can be reconnected by: VIDEO VISIT: Text to cell phone:   Telephone Information:   Mobile [phone]       Will anyone else be joining the visit? NO  (If patient encounters technical issues they should call [phone] :[id number])    How would you like to obtain your AVS? MyChart    Are changes needed to the allergy or medication list? Pt stated no med changes    Reason for visit: Consult    Keren WILKINSON       Video-Visit Details    Type of service:  Video Visit    Video Visit Start Time:11:32 AM    Video End Time: 11:54    Originating Location (pt. Location): Home      Distant Location (provider location):  Off-site     Platform used for Video Visit: [facility] Initial Visit  Joined the call at 9/14/2023, 11:31:09 am.  Left the call at 9/14/2023, 11:54:13 am.  Reason for Visit  Mike Schultz is a 72 year old male who is referred by Courtney for lung nodules  Pulmonary HPI    - He does experience dyspnea he attributes to paroxysmal atrial fib   - aorta screening discovered incidental lung nodules  Back in 2022 he had a CT for chest pain and there were no nodules on that one  - no episodes of pneumonia or bad cold    Other active medical problems include:   - atrial fibrillation on Xarelto, cardioversion attempted yesterday  - Bell's Palsy on Right last year with some residual symptoms      Exposure history: Denies asbestos or radon exposure   TB risk factors: No  Prior Imaging:Yes  Constitutional Symptoms: No  Personal history of cancer:Yes  Up to date on age-appropriate cancer  screening:No    ROS Pulmonary  Dyspnea: Yes, Cough: No, Chest pain: No, Wheezing: No, Sputum Production: No, Hemoptysis: No  A complete ROS was otherwise negative except as noted in the HPI.  The patient was seen and examined by Jeanette Mckeon MD   Current Outpatient Medications   Medication     allopurinol (ZYLOPRIM) 300 MG tablet     amiodarone (PACERONE) 200 MG tablet     cetirizine (ZYRTEC) 10 MG tablet     finasteride (PROPECIA) 1 MG tablet     furosemide (LASIX) 20 MG tablet     losartan (COZAAR) 100 MG tablet     potassium chloride ER (KLOR-CON M) 20 MEQ CR tablet     rivaroxaban ANTICOAGULANT (XARELTO ANTICOAGULANT) 20 MG TABS tablet     No current facility-administered medications for this visit.     Allergies   Allergen Reactions     No Known Drug Allergy      Social History     Socioeconomic History     Marital status: Single     Spouse name: Not on file     Number of children: Not on file     Years of education: Not on file     Highest education level: Not on file   Occupational History     Not on file   Tobacco Use     Smoking status: Former     Packs/day: 1.00     Years: 10.00     Pack years: 10.00     Types: Cigarettes     Start date:      Quit date: 2011     Years since quittin.0     Smokeless tobacco: Never   Vaping Use     Vaping Use: Never used   Substance and Sexual Activity     Alcohol use: No     Drug use: No     Sexual activity: Yes     Partners: Female   Other Topics Concern     Parent/sibling w/ CABG, MI or angioplasty before 65F 55M? Not Asked   Social History Narrative     Not on file     Social Determinants of Health     Financial Resource Strain: Not on file   Food Insecurity: Not on file   Transportation Needs: Not on file   Physical Activity: Not on file   Stress: Not on file   Social Connections: Not on file   Intimate Partner Violence: Not on file   Housing Stability: Not on file     Past Medical History:   Diagnosis Date     Cancer (H)     basal nose     H/O ETOH  abuse      Herpes simplex without mention of complication      Hyperlipidemia      Obesity      Persistent atrial fibrillation (H)     persistent, DCCV 12/2017, ablation 11/6/17     Sleep apnea     CPAP     Tobacco use disorder     dced 6/2004     Past Surgical History:   Procedure Laterality Date     ABLATION OF DYSRHYTHMIC FOCUS  2018, 04/12/2019    Procedure: EP Ablation Focal AFIB;  Surgeon: Fady Day MD;  Location:  HEART CARDIAC CATH LAB     ACHILLES TENDON SURGERY  1997     ANESTHESIA CARDIOVERSION N/A 2/26/2019    Procedure: ANESTHESIA CARDIOVERSION (CONNER);  Surgeon: GENERIC ANESTHESIA PROVIDER;  Location:  OR     ANESTHESIA CARDIOVERSION N/A 11/19/2020    Procedure: ANESTHESIA, FOR CARDIOVERSION (dr campbell);  Surgeon: GENERIC ANESTHESIA PROVIDER;  Location:  OR     ANESTHESIA CARDIOVERSION N/A 8/22/2022    Procedure: CARDIOVERSION;  Surgeon: GENERIC ANESTHESIA PROVIDER;  Location:  OR     ANESTHESIA CARDIOVERSION N/A 9/13/2023    Procedure: Anesthesia cardioversion;  Surgeon: GENERIC ANESTHESIA PROVIDER;  Location:  OR     BASAL CELL CARCINOMA EXCISION Right 2009    nose, took cartilage from inner ear.     CARDIOVERSION  2012, 2019    cardioversion for atr fib     COLONOSCOPY  2008     COLONOSCOPY N/A 3/22/2023    Procedure: COLONOSCOPY, FLEXIBLE, WITH LESION REMOVAL USING SNARE;  Surgeon: Anne Lucero MD;  Location:  GI     EP ABLATION FOCAL AFIB N/A 4/12/2019    Procedure: EP Ablation Focal AFIB;  Surgeon: Fady Day MD;  Location:  HEART CARDIAC CATH LAB     HERNIA REPAIR, UMBILICAL  08/20/2012    Procedure: HERNIORRHAPHY UMBILICAL;  UMBILICAL HERNIA REPAIR;  Surgeon: Ra Crocker MD;  Location: [facility]     HERNIORRHAPHY UMBILICAL  8/20/2012    Procedure: HERNIORRHAPHY UMBILICAL;  UMBILICAL HERNIA REPAIR;  Surgeon: Ra Crocker MD;  Location: [facility]     ORTHOPEDIC SURGERY       ORTHOPEDIC SURGERY       OTHER SURGICAL HISTORY Right 03/2017    total KNEE  "ARTHROSCOPY      TOTAL KNEE ARTHROPLASTY Left 2018     Z NONSPECIFIC PROCEDURE      achilles tendon     Z NONSPECIFIC PROCEDURE      knees, arthroscopy     ZZC NONSPECIFIC PROCEDURE      basal cell skin ca, nose     ZZC NONSPECIFIC PROCEDURE      flex sig; colonoscopy due 3/2008     ZZ NONSPECIFIC PROCEDURE      cardioversion for atr fib     ZZC TOTAL KNEE ARTHROPLASTY Left 2018    Dr. Malick Suarez     Family History   Problem Relation Age of Onset     Family History Negative Mother      Heart Disease Father         decesased at 42 - MI     Heart Disease Sister          MI     Lipids Sister      Lipids Sister      No Known Problems Sister      Heart Disease Brother          MI     Heart Disease Brother         CABG AT 49       Exam:   Ht 1.727 m (5' 8\")   Wt 129.3 kg (285 lb)   BMI 43.33 kg/m    GENERAL APPEARANCE: Well developed, well nourished, alert, and in no apparent distress.  PSYCH: mentation appears normal. and affect normal/bright  Results:  - My interpretation of the images relevant for this visit includes: CT chest imaging reviewed Dec 2022, Aug 2023, 2023 multiple nodules in the  base of both lungs, some elongated and irregular, some more round. Largest is about 2x1 cm  - My interpretation of the PFT's relevant for this visit includes: None       Assessment and plan: Brayden is a 73 yo male with incidental lung nodules but he probably meets lung cancer screening criteria.   Lung nodule - seen on chest CT, not seen in , no significant change between Aug and Sept, unusual in appearance, no respiratory symptoms. Difficult to biopsy due to location plus he is dealing with an atrial fibrillation situation and planning another cardioversion in a few weeks   Considering patient with high risk for lung cancer, recommend follow up CT in  2 months per [place] 2017. Nodify blood test ordered via portal    If Nodify results reclassifies probability of cancer as " lower risk/likely benign, recommend follow up CT in 3 months. If unable to lower pretest probability, will proceed with additional evaluation      Physician assessed risk of malignancy, based on patient and nodule features e.g. [place] lung nodule calculator ( 20.6 %)    Clinical Characteristics  Diagnosis (select one):multiple lung nodules  Nodule diameter: 15  Nodule located in upper lobe: no  Nodule spiculated: no  Does the patient have a history of cancer?  (select one) No history of cancer    Prior to receiving results, which test being considered? Follow-up CT           Again, thank you for allowing me to participate in the care of your patient.        Sincerely,        Jeanette Mckeon MD

## 2023-09-14 NOTE — PROGRESS NOTES
Is the patient currently in the state of [place]? YES    Visit mode:VIDEO    If the visit is dropped, the patient can be reconnected by: VIDEO VISIT: Text to cell phone:   Telephone Information:   Mobile [phone]       Will anyone else be joining the visit? NO  (If patient encounters technical issues they should call [phone])    How would you like to obtain your AVS? MyChart    Are changes needed to the allergy or medication list? Pt stated no med changes    Reason for visit: Consult    Keren Huang VVF       Video-Visit Details    Type of service:  Video Visit    Video Visit Start Time:11:32 AM    Video End Time: 11:54    Originating Location (pt. Location): Home      Distant Location (provider location):  Off-site     Platform used for Video Visit: Jeremy

## 2023-09-15 ENCOUNTER — TRANSFERRED RECORDS (OUTPATIENT)
Dept: HEALTH INFORMATION MANAGEMENT | Facility: CLINIC | Age: 73
End: 2023-09-15
Payer: COMMERCIAL

## 2023-09-15 LAB
ATRIAL RATE - MUSE: 79 BPM
DIASTOLIC BLOOD PRESSURE - MUSE: NORMAL MMHG
INTERPRETATION ECG - MUSE: NORMAL
P AXIS - MUSE: NORMAL DEGREES
PR INTERVAL - MUSE: NORMAL MS
QRS DURATION - MUSE: 90 MS
QT - MUSE: 438 MS
QTC - MUSE: 479 MS
R AXIS - MUSE: 67 DEGREES
SYSTOLIC BLOOD PRESSURE - MUSE: NORMAL MMHG
T AXIS - MUSE: 101 DEGREES
VENTRICULAR RATE- MUSE: 72 BPM

## 2023-09-25 ENCOUNTER — TELEPHONE (OUTPATIENT)
Dept: OPHTHALMOLOGY | Facility: CLINIC | Age: 73
End: 2023-09-25

## 2023-09-25 ENCOUNTER — OFFICE VISIT (OUTPATIENT)
Dept: OPHTHALMOLOGY | Facility: CLINIC | Age: 73
End: 2023-09-25
Attending: PSYCHIATRY & NEUROLOGY
Payer: COMMERCIAL

## 2023-09-25 ENCOUNTER — DOCUMENTATION ONLY (OUTPATIENT)
Dept: ONCOLOGY | Facility: CLINIC | Age: 73
End: 2023-09-25

## 2023-09-25 DIAGNOSIS — H53.8 BLURRED VISION: ICD-10-CM

## 2023-09-25 DIAGNOSIS — H52.203 MYOPIA OF BOTH EYES WITH ASTIGMATISM AND PRESBYOPIA: ICD-10-CM

## 2023-09-25 DIAGNOSIS — H25.813 COMBINED FORMS OF AGE-RELATED CATARACT OF BOTH EYES: ICD-10-CM

## 2023-09-25 DIAGNOSIS — H40.003 GLAUCOMA SUSPECT OF BOTH EYES: ICD-10-CM

## 2023-09-25 DIAGNOSIS — H52.13 MYOPIA OF BOTH EYES WITH ASTIGMATISM AND PRESBYOPIA: ICD-10-CM

## 2023-09-25 DIAGNOSIS — G51.0 BELL'S PALSY: Primary | ICD-10-CM

## 2023-09-25 DIAGNOSIS — H04.123 DRY EYE SYNDROME OF BOTH EYES: ICD-10-CM

## 2023-09-25 DIAGNOSIS — H52.4 MYOPIA OF BOTH EYES WITH ASTIGMATISM AND PRESBYOPIA: ICD-10-CM

## 2023-09-25 PROCEDURE — 92015 DETERMINE REFRACTIVE STATE: CPT | Performed by: OPHTHALMOLOGY

## 2023-09-25 PROCEDURE — 92004 COMPRE OPH EXAM NEW PT 1/>: CPT | Performed by: OPHTHALMOLOGY

## 2023-09-25 PROCEDURE — 92133 CPTRZD OPH DX IMG PST SGM ON: CPT | Performed by: OPHTHALMOLOGY

## 2023-09-25 ASSESSMENT — VISUAL ACUITY
METHOD: SNELLEN - LINEAR
OS_PH_SC+: +2
OS_SC: 20/30
OD_PH_SC: 20/40
OD_SC: 20/150
OS_PH_SC: 20/25
OS_SC+: +3

## 2023-09-25 ASSESSMENT — REFRACTION_MANIFEST
OD_CYLINDER: SPHERE
OD_ADD: +2.50
OD_SPHERE: -2.75
OS_ADD: +2.50
OS_SPHERE: -0.50
OS_CYLINDER: SPHERE

## 2023-09-25 ASSESSMENT — CONF VISUAL FIELD
METHOD: COUNTING FINGERS
OD_NORMAL: 1
OD_INFERIOR_NASAL_RESTRICTION: 0
OD_INFERIOR_TEMPORAL_RESTRICTION: 0
OS_INFERIOR_TEMPORAL_RESTRICTION: 0
OS_NORMAL: 1
OD_SUPERIOR_NASAL_RESTRICTION: 0
OS_SUPERIOR_NASAL_RESTRICTION: 0
OS_SUPERIOR_TEMPORAL_RESTRICTION: 0
OD_SUPERIOR_TEMPORAL_RESTRICTION: 0
OS_INFERIOR_NASAL_RESTRICTION: 0

## 2023-09-25 ASSESSMENT — SLIT LAMP EXAM - LIDS
COMMENTS: 2+ BLEPHARITIS
COMMENTS: 2+ BLEPHARITIS

## 2023-09-25 ASSESSMENT — CUP TO DISC RATIO
OD_RATIO: 0.75
OS_RATIO: 0.75

## 2023-09-25 ASSESSMENT — TONOMETRY
OS_IOP_MMHG: 15
OD_IOP_MMHG: 18
IOP_METHOD: ICARE

## 2023-09-25 NOTE — TELEPHONE ENCOUNTER
Left Voicemail (1st Attempt) for the patient to call back and schedule the following:    Appointment type: return  Provider: dr. clay  Return date: 9/25/2024  Specialty phone number: [phone]   Additonal Notes: Return in about 1 year (around 9/25/2024) for Annual Visit-v/t/d/MRx, KATYA rizvi Procedure   Orthopedics, Podiatry, Sports Medicine, Ent ,Eye , Audiology, Adult Endocrine & Diabetes, Nutrition & Medication Therapy Management Specialties   [facility]

## 2023-09-25 NOTE — PATIENT INSTRUCTIONS
"Use one drop of artificial tears both eyes 3-4 x daily.  Continue to use the drops regardless if your eyes are comfortable.  Artificial tears work best as a preventative and not as well after your eyes are starting to bother you.  Preservative-free drops are best if you will be using them more than 6x daily. Some brands include: Celluvisc, Refresh, Systane, Blink, Optive, iVizia. Avoid using any drops that state \"get the red out\". Also, use lubricating artificial tear ointment at bedtime in both eyes every night.  Genteal and Refresh PM are preservative-free; generic brands and Lacrilube are not.    It may take 4-6 weeks of using the drops before you notice improvement.  If after that time you are still having problems schedule an appointment for an evaluation and discussion of different treatments which may include medicated eye drops, punctal plugs to keep the tears that are made and supplemented on the surface of the eye longer, or other treatments. Dry eyes are a chronic condition and you may have more symptoms at certain times of the year.   "

## 2023-09-25 NOTE — NURSING NOTE
Chief Complaints and History of Present Illnesses   Patient presents with    Blurred Vision Right Eye       Chief Complaint(s) and History of Present Illness(es)       Blurred Vision Right Eye              Laterality: right eye              Comments    Patient here due to blurry vision in Right Eye.   Right eye has been blurry for the past 6 months. No double vision.   Dx w/ [place] Palsy 1.5 years ago, RE unable to close for a year.  Uses Systane drops as needed.   Currently wearing glasses for night driving, [facility] [employer] updated a month ago. Right eye vision is clear with the glasses on.   No pain, No flashes, no floaters.                        Chivo Balderas, Ophthalmic Assistant

## 2023-09-25 NOTE — PROGRESS NOTES
"  The blood test we ordered has resulted with \"reduced risk of malignancy.\" The reason we did the blood test was to look for proteins in the blood that could tell us if it's more (or less) likely to be cancer. The blood test didn't detect anything suggestive of cancer so we can continue monitoring with CT scan in 2 months.          Step1: Nodify CDT - looking for features suggestive of malignancy (NEGATIVE)    Autoantibody assay, 7 different AAB s  Results: Negative, Moderate Positive, and High Positive (positive in about 10% of patients tested)  Results within 24 hours of receipt of test  Covered by Medicaid/Medicare with $0 out of pocket (we bill patient s insurance and handle all appeals, financial assistance)  Allows you to move patients into diagnostic or resective procedure in a more timely manner       Step 2: Nodify XL2 - if CDT doesn't show features suggesting malignancy, this test looks for features that are reassuring for benign nodule    Proteomic test, looking for large ratio between two proteins  LG3BP and C163A  Results: Indeterminate, Reduced Risk, Likely Benign (roughly 80% of patients tested have a risk reduction)  Results within 5 business days (working towards 3 days)  Covered by Medicaid/Medicare with $0 out of pocket (we bill patient s insurance and handle all appeals, financial assistance)  Allows you to move patients out of unnecessary procedures, give them peace of mind, and improves patient compliance for follow ups     "

## 2023-09-26 NOTE — PROGRESS NOTES
HPI       Blurred Vision Right Eye    In right eye.             Comments    Patient here due to blurry vision in Right Eye.   Right eye has been blurry for the past 6 months. No double vision.   Dx w/ [place] Palsy 1.5 years ago, RE unable to close for a year.  Uses Systane drops as needed.   Currently wearing glasses for night driving, [employer] [facility] updated a month ago. Right eye vision is clear with the glasses on.   No pain, No flashes, no floaters.     denies family history of ocular conditions  denies history of ocular surgeries              Last edited by Maninder Huffman MD on 9/25/2023  2:07 PM.         Review of systems for the eyes was negative other than the pertinent positives/negatives listed in the HPI.      Assessment & Plan    HPI:  Mike Schultz is a 73 year old male with history of Bell's palsy, HTN, afib, BOWEN, gout who presents for new exam. He notes right eye blurry vision x 6 months. Denies diplopia. He wears glasses from [employer] for driving only.       POHx: Bell's palsy, HTN, afib, BOWEN, gout  PMHx: myopia with astigmatism and presbyopia  Current Medications: allopurinol (ZYLOPRIM) 300 MG tablet, Take 1 tablet (300 mg) by mouth daily  amiodarone (PACERONE) 200 MG tablet, Take 2 tablets (400 mg) by mouth daily for 7 days, THEN 1 tablet (200 mg) daily.  cetirizine (ZYRTEC) 10 MG tablet, Take 10 mg by mouth as needed for allergies  finasteride (PROPECIA) 1 MG tablet, Take 1 tablet (1 mg) by mouth daily  furosemide (LASIX) 20 MG tablet, Take 1 tablet (20 mg) by mouth daily  losartan (COZAAR) 100 MG tablet, Take 1 tablet (100 mg) by mouth daily  potassium chloride ER (KLOR-CON M) 20 MEQ CR tablet, Take 1 tablet (20 mEq) by mouth 2 times daily  rivaroxaban ANTICOAGULANT (XARELTO ANTICOAGULANT) 20 MG TABS tablet, Take 1 tablet (20 mg) by mouth daily (with dinner)    No current facility-administered medications on file prior to visit.    FHx: denies family history of ocular  conditions   PSHx: denies history of ocular surgeries       Current Eye Medications:      Assessment & Plan:  (H53.8) Blurred vision  (H52.13,  H52.203,  H52.4) Myopia of both eyes with astigmatism and presbyopia  Natural monovision with MRx  Does not appear new as patient has not needed reading or distance glasses for every day function  Likely realized when isolating, but does not appear new  MRI from 2/17/23 without compressive mass causing axial myopia    (G51.0) Bell's palsy   (H04.123) Dry eye syndrome of both eyes  Diagnosed 1/23/22  MRI normal  No lagophthalmos on exam today  Encouraged AT four times a day  and ointment qhs    (H25.813) Combined forms of age-related cataract of both eyes  Moderate cataracts are present and may account for some of the patient's visual complaints. Patient defers treatment at this time. The patient will monitor for vision changes and contact us with any decrease in vision. Recheck in one year    (H40.003) Glaucoma suspect of both eyes  Based on cup to disc ratio  Maximum intraocular pressure 18/15  Family history: No  Trauma history: No  Gonioscopy:   Pachymetry:   Treatment History:     Today's testing:  OCT nerve fiber layer 09/25/23:   Right eye - G(r) 72 NI (r) 45 TI (y) 105 NS (g) 73 TS (g) 116      Left eye - G(y) 76 NI (y) 66 TI (g) 115 NS (g) 75 TS (y) 96    IOP goal: mid teens  Discussed glaucoma diagnosis at length including etiology of disease and treatment options including laser, drops, or surgery  Return for additional baseline testing, kurtz visual field (HVF) 24-2, pachmetry, gonio      Return in about 3 months (around 12/25/2023) for Follow Up-v/t, 24-2 VF, pachy, gonio.        Maninder Huffman MD     Attending Physician Attestation:  Complete documentation of historical and exam elements from today's encounter can be found in the full encounter summary report (not reduplicated in this progress note).  I personally obtained the chief complaint(s) and  history of present illness.  I confirmed and edited as necessary the review of systems, past medical/surgical history, family history, social history, and examination findings as documented by others; and I examined the patient myself.  I personally reviewed the relevant tests, images, and reports as documented above.  I formulated and edited as necessary the assessment and plan and discussed the findings and management plan with the patient and family. - Maninder Huffman MD

## 2023-10-05 ENCOUNTER — TELEPHONE (OUTPATIENT)
Dept: CARDIOLOGY | Facility: CLINIC | Age: 73
End: 2023-10-05
Payer: COMMERCIAL

## 2023-10-05 ENCOUNTER — ANESTHESIA EVENT (OUTPATIENT)
Dept: SURGERY | Facility: CLINIC | Age: 73
End: 2023-10-05
Payer: COMMERCIAL

## 2023-10-05 ASSESSMENT — LIFESTYLE VARIABLES: TOBACCO_USE: 1

## 2023-10-05 ASSESSMENT — ENCOUNTER SYMPTOMS
ORTHOPNEA: 0
DYSRHYTHMIAS: 1

## 2023-10-05 NOTE — TELEPHONE ENCOUNTER
DCCV/RAMIRO prep instructions    Patient is scheduled for a Cardioversion at [facility] - [address], [place] [address] - Main Entrance of the Hospital, on 1006/2023.  Check in time is at 6:30 A and procedure to follow.    Patient instructed to remain NPO for solid foods 8 hours prior to arrival and may have clear liquids up to 2 hours prior to arrival.    Patient is taking Pradaxa/Xarelto/Eliquis and has been taking daily uninterrupted for at least 21days.     Patient is not diabetic.     Patient is not taking Digoxin.    Patient is taking taking furosemide and has been instructed to hold it the morning of procedure. and has been advised to hold this the morning of the procedure.    Pt is not on a SGLT2 inhibitor.    Pt is not on a GLP-1 Agonist    Patient advised to take their other daily medications the morning of the procedure with small sips of water.     Verified patient has someone available to drive them home from the hospital and can stay with them for 24 hours after the procedure. Pt wife will be  on discharge    Patient advised to notify care team with any new COVID like symptoms prior to procedure.    Patient will check their temperature the morning of procedure and call [facility] at [phone] if temp is >100.0.    Patient expresses understanding of above instructions and denies further questions at this time.      Miguel Ángel   [facility]

## 2023-10-05 NOTE — ANESTHESIA PREPROCEDURE EVALUATION
Anesthesia Pre-Procedure Evaluation    Patient: Mike Schultz   MRN: [id number] : 1950        Procedure : Procedure(s):  Anesthesia cardioversion          Past Medical History:   Diagnosis Date    Arthritis     gout corrected  taking allopurinol    Cancer (H)     basal nose    H/O ETOH abuse     Herpes simplex without mention of complication     Hyperlipidemia     Hypertension 2000    Obesity     Persistent atrial fibrillation (H)     persistent, DCCV 2017, ablation 17    Sleep apnea     CPAP    Tobacco use disorder     dced 2004      Past Surgical History:   Procedure Laterality Date    ABLATION OF DYSRHYTHMIC FOCUS  , 2019    Procedure: EP Ablation Focal AFIB;  Surgeon: Fady Day MD;  Location:  HEART CARDIAC CATH LAB    ACHILLES TENDON SURGERY      ANESTHESIA CARDIOVERSION N/A 2019    Procedure: ANESTHESIA CARDIOVERSION (CONNER);  Surgeon: GENERIC ANESTHESIA PROVIDER;  Location:  OR    ANESTHESIA CARDIOVERSION N/A 2020    Procedure: ANESTHESIA, FOR CARDIOVERSION (dr campbell);  Surgeon: GENERIC ANESTHESIA PROVIDER;  Location:  OR    ANESTHESIA CARDIOVERSION N/A 2022    Procedure: CARDIOVERSION;  Surgeon: GENERIC ANESTHESIA PROVIDER;  Location:  OR    ANESTHESIA CARDIOVERSION N/A 2023    Procedure: Anesthesia cardioversion;  Surgeon: GENERIC ANESTHESIA PROVIDER;  Location:  OR    BASAL CELL CARCINOMA EXCISION Right 2009    nose, took cartilage from inner ear.    CARDIOVERSION  2019    cardioversion for atr fib    COLONOSCOPY      COLONOSCOPY N/A 3/22/2023    Procedure: COLONOSCOPY, FLEXIBLE, WITH LESION REMOVAL USING SNARE;  Surgeon: Anne Lucero MD;  Location:  GI    EP ABLATION FOCAL AFIB N/A 2019    Procedure: EP Ablation Focal AFIB;  Surgeon: Fady Day MD;  Location:  HEART CARDIAC CATH LAB    HERNIA REPAIR, UMBILICAL  2012    Procedure: HERNIORRHAPHY UMBILICAL;  UMBILICAL HERNIA REPAIR;  Surgeon: Obi  Ra Breaux MD;  Location: [facility]    HERNIORRHAPHY UMBILICAL  2012    Procedure: HERNIORRHAPHY UMBILICAL;  UMBILICAL HERNIA REPAIR;  Surgeon: Ra Crocker MD;  Location: [facility]    ORTHOPEDIC SURGERY      ORTHOPEDIC SURGERY      OTHER SURGICAL HISTORY Right 2017    total KNEE ARTHROSCOPY     TOTAL KNEE ARTHROPLASTY Left 2018    [facility] NONSPECIFIC PROCEDURE      achilles tendon    [facility] NONSPECIFIC PROCEDURE      knees, arthroscopy    [facility] NONSPECIFIC PROCEDURE      basal cell skin ca, nose    [facility] NONSPECIFIC PROCEDURE      flex sig; colonoscopy due 3/2008    [facility] NONSPECIFIC PROCEDURE      cardioversion for atr fib    [facility] TOTAL KNEE ARTHROPLASTY Left 2018    Dr. Malick Suarez      Allergies   Allergen Reactions    No Known Drug Allergy       Social History     Tobacco Use    Smoking status: Former     Packs/day: 1.00     Years: 10.00     Pack years: 10.00     Types: Cigarettes     Start date:      Quit date: 2011     Years since quittin.1    Smokeless tobacco: Never    Tobacco comments:     quit    Substance Use Topics    Alcohol use: Not Currently      Wt Readings from Last 1 Encounters:   23 129.3 kg (285 lb)        Anesthesia Evaluation   Pt has had prior anesthetic.         ROS/MED HX  ENT/Pulmonary:     (+) sleep apnea, doesn't use CPAP,         allergic rhinitis,     tobacco use, Past use,                      Neurologic: Comment: bilat CTS, Bell's palsy       Cardiovascular: Comment: Narrative & Impression  [id number]  [id number]  [id number]^BAYRON^JOANIE^SHYANN     [facility]  [facility]  Echocardiography Laboratory  [address]  [address]  [place] [address]  Phone [phone]  Fax [phone]     Name: TOO HOWARD  MRN: [id number]  : 1950  Study Date: 2023 07:48 AM  Age: 72 yrs  Gender: Male  Patient [facility]  Reason For Study: Nonrheumatic aortic valve  stenosis  Ordering Physician: JOANIE VERMA  Referring Physician: Ranjan Courtney MD  Performed By: Henrique Raza     BSA: 2.4 m2  Height: 68 in  Weight: 279 lb  HR: 76  BP: 162/96 mmHg  ______________________________________________________________________________  Procedure  Complete Echo Adult.     ______________________________________________________________________________  Interpretation Summary     1. The left ventricle is borderline dilated. Biplane LVEF is 49%. There is  borderline-mild global hypokinesia of the left ventricle.  2. The right ventricle is normal in structure, function and size.  3. Moderate valvular aortic stenosis. Mean 16mmHg, Vmax 2.7m/s, DI 0.31.  4. The ascending aorta is Mildly dilated. 4.2cm.     Echo 10/2022 showed EF 50-55%, AS with mean 18mmHg, Vmax 2.9m/s, DI 0.36.      (+) Dyslipidemia hypertension- -  CAD - past MI - -   Taking blood thinners        DELEON.             dysrhythmias, a-fib and a-flutter,  valvular problems/murmurs type: AS       (-) CHF, orthopnea/PND and syncope   METS/Exercise Tolerance:     Hematologic:       Musculoskeletal:       GI/Hepatic:    (-) GERD   Renal/Genitourinary:       Endo:     (+)               Obesity,       Psychiatric/Substance Use:     (+)   alcohol abuse      Infectious Disease:       Malignancy:       Other:            Physical Exam    Airway        Mallampati: II   TM distance: > 3 FB   Neck ROM: full   Mouth opening: > 3 cm    Respiratory Devices and Support         Dental  no notable dental history         Cardiovascular   cardiovascular exam normal          Pulmonary   pulmonary exam normal                OUTSIDE LABS:  CBC:   Lab Results   Component Value Date    WBC 7.5 12/17/2022    WBC 7.7 02/28/2022    HGB 15.5 12/17/2022    HGB 16.7 08/22/2022    HCT 46.7 12/17/2022    HCT 49 08/22/2022     12/17/2022     02/28/2022     BMP:   Lab Results   Component Value Date     09/06/2023      07/27/2023    POTASSIUM 4.1 09/13/2023    POTASSIUM 3.8 09/06/2023    CHLORIDE 99 09/06/2023    CHLORIDE 105 07/27/2023    CO2 33 (H) 09/06/2023    CO2 27 07/27/2023    BUN 19.2 09/06/2023    BUN 21.1 07/27/2023    CR 1.05 09/06/2023    CR 0.97 07/27/2023     (H) 09/06/2023     (H) 07/27/2023     COAGS:   Lab Results   Component Value Date    INR 2.04 (H) 02/26/2019     POC: No results found for: BGM, HCG, HCGS  HEPATIC:   Lab Results   Component Value Date    ALBUMIN 3.5 12/17/2022    PROTTOTAL 6.9 12/17/2022    ALT 32 12/17/2022    AST 34 12/17/2022    ALKPHOS 69 12/17/2022    BILITOTAL 0.7 12/17/2022     OTHER:   Lab Results   Component Value Date    A1C 5.7 (H) 02/25/2019    ASHELY 8.6 (L) 09/06/2023    PHOS 3.1 09/21/2020    MAG 1.7 09/13/2023    TSH 2.53 01/17/2023    T4 1.06 10/18/2022    CRP <2.9 01/08/2021    SED 10 12/17/2022       Anesthesia Plan    ASA Status:  3    NPO Status:  NPO Appropriate    Anesthesia Type: General.     - Airway: Native airway              Consents    Anesthesia Plan(s) and associated risks, benefits, and realistic alternatives discussed. Questions answered and patient/representative(s) expressed understanding.     - Discussed:     - Discussed with:  Patient            Postoperative Care            Comments:                Jose Luis Camp DO, DO

## 2023-10-06 ENCOUNTER — MYC MEDICAL ADVICE (OUTPATIENT)
Dept: CARDIOLOGY | Facility: CLINIC | Age: 73
End: 2023-10-06

## 2023-10-06 ENCOUNTER — HOSPITAL ENCOUNTER (OUTPATIENT)
Dept: MEDSURG UNIT | Facility: CLINIC | Age: 73
Discharge: HOME | End: 2023-10-06
Attending: PHYSICIAN ASSISTANT
Payer: COMMERCIAL

## 2023-10-06 ENCOUNTER — ANESTHESIA (OUTPATIENT)
Dept: SURGERY | Facility: CLINIC | Age: 73
End: 2023-10-06
Payer: COMMERCIAL

## 2023-10-06 ENCOUNTER — HOSPITAL ENCOUNTER (OUTPATIENT)
Facility: CLINIC | Age: 73
Discharge: HOME | End: 2023-10-06
Admitting: INTERNAL MEDICINE
Payer: COMMERCIAL

## 2023-10-06 VITALS
HEART RATE: 62 BPM | DIASTOLIC BLOOD PRESSURE: 69 MMHG | RESPIRATION RATE: 23 BRPM | SYSTOLIC BLOOD PRESSURE: 122 MMHG | OXYGEN SATURATION: 96 %

## 2023-10-06 DIAGNOSIS — I48.19 PERSISTENT ATRIAL FIBRILLATION (H): ICD-10-CM

## 2023-10-06 LAB
MAGNESIUM SERPL-MCNC: 1.8 MG/DL (ref 1.7–2.3)
POTASSIUM SERPL-SCNC: 3.8 MMOL/L (ref 3.4–5.3)

## 2023-10-06 PROCEDURE — 93005 ELECTROCARDIOGRAM TRACING: CPT

## 2023-10-06 PROCEDURE — 92960 CARDIOVERSION ELECTRIC EXT: CPT | Performed by: INTERNAL MEDICINE

## 2023-10-06 PROCEDURE — 999N000054 HC STATISTIC EKG NON-CHARGEABLE

## 2023-10-06 PROCEDURE — 84132 ASSAY OF SERUM POTASSIUM: CPT

## 2023-10-06 PROCEDURE — 250N000013 HC RX MED GY IP 250 OP 250 PS 637

## 2023-10-06 PROCEDURE — 92960 CARDIOVERSION ELECTRIC EXT: CPT

## 2023-10-06 PROCEDURE — 36591 DRAW BLOOD OFF VENOUS DEVICE: CPT

## 2023-10-06 PROCEDURE — 83735 ASSAY OF MAGNESIUM: CPT

## 2023-10-06 PROCEDURE — 999N000010 HC STATISTIC ANES STAT CODE-CRNA PER MINUTE

## 2023-10-06 PROCEDURE — 999N000184 HC STATISTIC TELEMETRY

## 2023-10-06 PROCEDURE — 250N000011 HC RX IP 250 OP 636: Mod: JZ

## 2023-10-06 PROCEDURE — 36415 COLL VENOUS BLD VENIPUNCTURE: CPT

## 2023-10-06 PROCEDURE — 370N000017 HC ANESTHESIA TECHNICAL FEE, PER MIN

## 2023-10-06 PROCEDURE — 93010 ELECTROCARDIOGRAM REPORT: CPT | Performed by: INTERNAL MEDICINE

## 2023-10-06 RX ORDER — FLUMAZENIL 0.1 MG/ML
0.2 INJECTION, SOLUTION INTRAVENOUS
Status: DISCONTINUED | OUTPATIENT
Start: 2023-10-06 | End: 2023-10-06 | Stop reason: HOSPADM

## 2023-10-06 RX ORDER — POTASSIUM CHLORIDE 1500 MG/1
20 TABLET, EXTENDED RELEASE ORAL
Status: DISCONTINUED | OUTPATIENT
Start: 2023-10-06 | End: 2023-10-06 | Stop reason: HOSPADM

## 2023-10-06 RX ORDER — MAGNESIUM SULFATE HEPTAHYDRATE 40 MG/ML
2 INJECTION, SOLUTION INTRAVENOUS
Status: DISCONTINUED | OUTPATIENT
Start: 2023-10-06 | End: 2023-10-06 | Stop reason: HOSPADM

## 2023-10-06 RX ORDER — ATROPINE SULFATE 0.1 MG/ML
.5-1 INJECTION INTRAVENOUS
Status: DISCONTINUED | OUTPATIENT
Start: 2023-10-06 | End: 2023-10-06 | Stop reason: HOSPADM

## 2023-10-06 RX ORDER — BENZOCAINE/MENTHOL 6 MG-10 MG
LOZENGE MUCOUS MEMBRANE 3 TIMES DAILY PRN
Status: DISCONTINUED | OUTPATIENT
Start: 2023-10-06 | End: 2023-10-06 | Stop reason: HOSPADM

## 2023-10-06 RX ORDER — NALOXONE HYDROCHLORIDE 0.4 MG/ML
0.2 INJECTION, SOLUTION INTRAMUSCULAR; INTRAVENOUS; SUBCUTANEOUS
Status: DISCONTINUED | OUTPATIENT
Start: 2023-10-06 | End: 2023-10-06 | Stop reason: HOSPADM

## 2023-10-06 RX ORDER — SODIUM CHLORIDE 9 MG/ML
INJECTION, SOLUTION INTRAVENOUS CONTINUOUS
Status: DISCONTINUED | OUTPATIENT
Start: 2023-10-06 | End: 2023-10-06 | Stop reason: HOSPADM

## 2023-10-06 RX ORDER — BENZOCAINE/MENTHOL 6 MG-10 MG
LOZENGE MUCOUS MEMBRANE 3 TIMES DAILY PRN
Status: CANCELLED | OUTPATIENT
Start: 2023-10-06 | End: 2023-10-08

## 2023-10-06 RX ORDER — PROPOFOL 10 MG/ML
INJECTION, EMULSION INTRAVENOUS PRN
Status: DISCONTINUED | OUTPATIENT
Start: 2023-10-06 | End: 2023-10-06

## 2023-10-06 RX ORDER — NALOXONE HYDROCHLORIDE 0.4 MG/ML
0.4 INJECTION, SOLUTION INTRAMUSCULAR; INTRAVENOUS; SUBCUTANEOUS
Status: DISCONTINUED | OUTPATIENT
Start: 2023-10-06 | End: 2023-10-06 | Stop reason: HOSPADM

## 2023-10-06 RX ORDER — POTASSIUM CHLORIDE 1500 MG/1
40 TABLET, EXTENDED RELEASE ORAL
Status: DISCONTINUED | OUTPATIENT
Start: 2023-10-06 | End: 2023-10-06 | Stop reason: HOSPADM

## 2023-10-06 RX ADMIN — POTASSIUM CHLORIDE 20 MEQ: 1500 TABLET, EXTENDED RELEASE ORAL at 09:33

## 2023-10-06 RX ADMIN — PROPOFOL 20 MG: 10 INJECTION, EMULSION INTRAVENOUS at 10:38

## 2023-10-06 RX ADMIN — MAGNESIUM SULFATE HEPTAHYDRATE 2 G: 40 INJECTION, SOLUTION INTRAVENOUS at 09:34

## 2023-10-06 RX ADMIN — PROPOFOL 30 MG: 10 INJECTION, EMULSION INTRAVENOUS at 10:37

## 2023-10-06 RX ADMIN — PROPOFOL 70 MG: 10 INJECTION, EMULSION INTRAVENOUS at 10:36

## 2023-10-06 ASSESSMENT — ACTIVITIES OF DAILY LIVING (ADL)
ADLS_ACUITY_SCORE: 35
ADLS_ACUITY_SCORE: 35

## 2023-10-06 NOTE — PROCEDURES
[facility]    Procedure: Cardioversion    Date/Time: 10/6/2023 10:49 AM    Performed by: Rocky Marinelli MD  Authorized by: Natalie Campos PA-C      UNIVERSAL PROTOCOL   Site Marked: NA  Prior Images Obtained and Reviewed:  Yes  Required items: Required blood products, implants, devices and special equipment available    Patient identity confirmed:  Verbally with patient, arm band, provided demographic data and hospital-assigned identification number  Patient was reevaluated immediately before administering moderate or deep sedation or anesthesia  Confirmation Checklist:  Patient's identity using two indicators, relevant allergies, procedure was appropriate and matched the consent or emergent situation and correct equipment/implants were available  Time out: Immediately prior to the procedure a time out was called    Universal Protocol: the Joint Commission Universal Protocol was followed    Preparation: Patient was prepped and draped in usual sterile fashion       ANESTHESIA    Anesthesia was administered and monitored by anesthesiology.  See anesthesia documentation for details.    SEDATION  Patient Sedated: Yes    Sedation Type:  Deep  Sedation:  Propofol  Vital signs: Vital signs monitored during sedation      PROCEDURE DETAILS  Cardioversion basis: elective  Indications: failure of anti-arrhythmic medications  Pre-procedure rhythm: atrial fibrillation  Patient position: patient was placed in a supine position  Chest area: chest area exposed  Electrodes: pads  Electrodes placed: anterior-posterior  Number of attempts: 1    Details of Attempts:  A single, biphasic, synchronized shock was delivered with 200 J and pads in AP position successfully converting the patient to SR.   Post-procedure rhythm: normal sinus rhythm  Complications: no complications      PROCEDURE    Patient Tolerance:  Patient tolerated the procedure well with no immediate complications   The patient  confirmed that he has been on Xarelto consistently for more than a month prior to the procedure. He was advised not to stop Xarelto for at least a month after the procedure

## 2023-10-06 NOTE — PROGRESS NOTES
Care Suites Discharge Nursing Note    Patient Information  Name: Mike Schultz  Age: 73 year old    Discharge Education:  Discharge instructions reviewed: Yes  Additional education/resources provided: no  Patient/patient representative verbalizes understanding: Yes  Patient discharging on new medications: No  Medication education completed: N/A    Discharge Plans:   Discharge location: home  Discharge ride contacted: Yes  Approximate discharge time: 1130    Discharge Criteria:  Discharge criteria met and vital signs stable: Yes    Patient Belongs:  Patient belongings returned to patient: Yes    Taryn Mock RN

## 2023-10-06 NOTE — PRE-PROCEDURE
GENERAL PRE-PROCEDURE:   Procedure:  Cardioversion of afib  Date/Time:  10/6/2023 10:25 AM    Verbal consent obtained?: Yes    Written consent obtained?: Yes    Risks and benefits: Risks, benefits and alternatives were discussed    DC Plan: Appropriate discharge home plan in place for patients who are going home after procedure   Consent given by:  Patient  Patient states understanding of procedure being performed: Yes    Patient's understanding of procedure matches consent: Yes    Procedure consent matches procedure scheduled: Yes    Expected level of sedation:  Deep  Appropriately NPO:  Yes  ASA Class:  3  Mallampati  :  Grade 3- soft palate visible, posterior pharyngeal wall not visible  Lungs:  Lungs clear with good breath sounds bilaterally  Heart:  A-fib  History & Physical reviewed:  History and physical reviewed and no updates needed  Statement of review:  I have reviewed the lab findings, diagnostic data, medications, and the plan for sedation

## 2023-10-06 NOTE — ANESTHESIA POSTPROCEDURE EVALUATION
Patient: Mike Schultz    Procedure: Procedure(s):  Anesthesia cardioversion       Anesthesia Type:  General    Note:     Postop Pain Control: Uneventful            Sign Out: Well controlled pain   PONV: No   Neuro/Psych: Uneventful            Sign Out: Acceptable/Baseline neuro status   Airway/Respiratory: Uneventful            Sign Out: Acceptable/Baseline resp. status   CV/Hemodynamics: Uneventful            Sign Out: Acceptable CV status; No obvious hypovolemia; No obvious fluid overload   Other NRE: NONE   DID A NON-ROUTINE EVENT OCCUR?            Last vitals:  Vitals:    10/06/23 1052 10/06/23 1059 10/06/23 1100   BP: 114/73  122/69   Pulse: 63 63 62   Resp: 15 14 23   SpO2: 96%  96%       Electronically Signed By: Jose Luis Camp DO, DO  October 6, 2023  3:03 PM

## 2023-10-06 NOTE — DISCHARGE INSTRUCTIONS
Cardioversion Discharge Instructions    After you go home:       For 24 hours - due to the sedation you received:    Have an adult stay with you for 24 hours.   Relax and take it easy.  Do NOT make any important or legal decisions.  Do NOT drive or operate machines at home or at work.  Do NOT drink alcohol.    Diet:    Start with clear liquids and progress to your normal diet as you feel able.    Medicines:    Take your medications, including blood thinners, unless your provider tells you not to.  If you have stopped any medications, check with your provider about when to restart them.    Follow Up Appointments:    Follow up with your cardiologist at [facility] of patient preference as instructed.  Follow up with your primary care provider as needed.    Post cardioversion:    The skin on your chest or back may feel tender for 48 hours.  If your skin is tender, you may:    Use a cold pack on the site. Never use ice directly on your skin. Use the cold pack for 20 minutes. Remove it for at least 30 minutes before re-using.  Apply 1% hydrocortisone cream to the skin (sold at drug stores)  Take Advil (Ibuprofen) or Tylenol (Acetaminophen) per your provider's recommendations.      Call your provider if you have:    Weakness, dizziness, lightheadedness, or fainting.  Shortness of breath.  Irregular heartbeat, feelings of your heart fluttering or beating fast, hard or palpitations.   More than minor skin discomfort or redness where the cardioversion pads were placed.  Questions or concerns.      Call 911 if you have:    Pain in your chest, arm, shoulder, neck, or upper back.  You have problems speaking or seeing.  Weakness in your arm or leg.  You are unable to move your arm or leg.  You have uncontrolled bleeding.         [facility] [place]:    [phone] [facility] (7 days a week)

## 2023-10-09 NOTE — TELEPHONE ENCOUNTER
That's great news!      Pls have him see me with EKG ideally 10/26 @ 3:30 PM (spot open). If that doesn't work for him, 1130 over lunch on any of my rounding days would work.    Reza Rojas

## 2023-10-10 LAB
ATRIAL RATE - MUSE: 340 BPM
DIASTOLIC BLOOD PRESSURE - MUSE: NORMAL MMHG
INTERPRETATION ECG - MUSE: NORMAL
P AXIS - MUSE: NORMAL DEGREES
PR INTERVAL - MUSE: NORMAL MS
QRS DURATION - MUSE: 90 MS
QT - MUSE: 418 MS
QTC - MUSE: 473 MS
R AXIS - MUSE: 77 DEGREES
SYSTOLIC BLOOD PRESSURE - MUSE: NORMAL MMHG
T AXIS - MUSE: 102 DEGREES
VENTRICULAR RATE- MUSE: 77 BPM

## 2023-10-10 NOTE — TELEPHONE ENCOUNTER
He's been on amio in the past without DELEON. Would not change this from 200 mg daily at this point given he just had his DCCV and still at high risk for bouncing back into AFib.    Typically, his SOB comes on with AFib d/t fluid overload.    Increase Lasix to 40 mg daily x 5 days then back to 20 mg daily  Increase KCl to 40 mEq BID x 5 days then back to 20 BID  At this point, do not change amio 200 mg daily   Continue Xarelto 20 mg daily without interruption    5. He should check in with us next week with update on breathing after 5 day bump of diuretic    Reza Rojas

## 2023-10-10 NOTE — TELEPHONE ENCOUNTER
Spoke to patient to review recommendations per TOBI Rivas.  Patient requesting recap to be sent via my chart.  This was sent.  Patient provided verbal understanding regarding recommendations.  PRABHA Palomo

## 2023-10-19 ENCOUNTER — MYC MEDICAL ADVICE (OUTPATIENT)
Dept: CARDIOLOGY | Facility: CLINIC | Age: 73
End: 2023-10-19
Payer: COMMERCIAL

## 2023-10-19 DIAGNOSIS — E87.6 HYPOKALEMIA: ICD-10-CM

## 2023-10-19 DIAGNOSIS — I10 BENIGN ESSENTIAL HYPERTENSION: ICD-10-CM

## 2023-10-25 NOTE — PROGRESS NOTES
[facility]    I had the pleasure of seeing Marty he came for follow up of AFib.  This 72 year old sees Dr. Day for his history of:    1. H/o persistent AFib. EF 50-55% 1/2022 - first dx'd in 2012. Failed flecainide (and noted scar on MRI). Underwent PVI and SVC isolation 11/2017. Repeat PVI and CTI for inducible AFlutter 4/2019. Started on Amio with DCCV 11/19/2020 for recurrent AFib in setting of increased alcohol use.   Amiodarone decreased 1/2021; back in persistent AFib 12/2021 and amiodarone stopped as felt asx'c.  This was restarted 8/2022, now s/p DCCV 8/22/2022. Amiodarone stopped again 1/2023. AFib restarted 7/2023 after EtOH use, now s/p DCCV (unsuccessful 9/13), then loaded with amiodarone s/p successful DCCV 10/6/2023.  Dr. Day recommended repeat AFib ablation if he started having AFib without it being triggered by EtOH.  2. Moderate Aortic Stenosis - echo 8/2023 with mean gradient 16 mmHg  3. Benign Essential HTN   4. BOWEN   5. Myocardiac Scar noted on cardiac MRI 10/2017, consistent with prior MI in the left Cx territory. EF 51%.  Avoiding flecainide use  6. H/o Alcohol dependence - no EtOH since 2017, then again 3-4 months in early 2022 after he was noted to be back in AFib.Back in AFib with EtOH use 7/2023  7. Chronic AC  On Xarelto for CHADSVASc 3 (HTN, age, aortic plaquing)  8. Bell's Palsy  9. Lung nodules- noted on CTA to follow-up on aorta 8/2023.     Last Visit & Interval History:  I saw Brayden 9/2023 at which time we reviewed plan for DCCV after going back into AFib, likely triggered by EtOH intake. I recommended restarting amiodarone followed by DCCV but he declined AAD therapy. I switched hydrochlorothiazide to Lasix for fluid retention, which is typically his major sx when in AFib.    DCCV 9/13 was unsuccessful in restoring SR. He agreed then to start amiodarone load (400 mg x 1 week, tn 200 mg daily). Repeat DCCV 10/6 successfully restored SR.  He sent a MyChart  "message noting continued DELEON despite maintenance of SR 10/9. I asked him to increase Lasix to 40 mg daily x 5 days, and KCL to 40 mEq BID x 5 days, and he wrote back 10/19 noting sxs had improved. We therefore went back to Lasix 20 mg daily and KCl 20 mEq BID at that time.     Today's Visit:  Brayden is pleased that he remains in SR.  He is tolerating amiodarone without too much trouble at this point as far as he is aware.    He notes increased furosemide x 5 really improved his edema and DELEON.  No orthopnea or PND.    Unfortunately, he Notes reduced stamina/energy despite normal rhythm.  Feels more lethargic. No fainting/falling.  Does not have the motivation to exercise, and knows that usually a huge part of his weight loss strategy.    BPs not checked at home.    VITALS:  Vitals: BP (!) 164/99   Pulse 67   Ht 1.727 m (5' 8\")   Wt 126.7 kg (279 lb 4.8 oz)   SpO2 93%   BMI 42.47 kg/m      Diagnostic Testing:  EKG today, which I overread, showed SR 63 bpm with PVC. QTc 450 ms on amiodarone 200 mg daily  EKG 9/2023 showed AFib 85 bpm  Echo 9/6/2023 showed LVEF 49%. Nl RV. Nl LA size. 1+MR. Mod AoS with mean gradient 16mmHg, Vmax 2.7 m/s, DI 0.31  Aorta 8/2023 with aneuryamsl dilation of suprarenal aorta (3.6x3.8). follow-up CTA showed no aneurysm  Echo 10/18/2022 with low-normal LVEF 50-55%. Nl RV. MIld-mod AoS with mean gradient 18 mmHg, Vmax 2.9 m/s, MARILYN 1.3 cm2, DI 0.36. Mildly dilated asc ao 4.3 com  Echo 1/10/79343 showed EF 50-55% LVSF mildly reduced. No RWMA. Nl RV. LA mildly dilated 4.1 cm with volume index 35.7ml/m2. Nl MV. Trace TR. Trace AI. MARILYN 1.4 cm2 with mean gradient 12.7mmHg c/w mild-mod AoS. Asc aorta mildly dilated 4.1 cm  Holter Monitor 12/29/2021 on no AVn blocking agents showed AFib with avg HR 81 bpm (range 44--143 bpm). 2% PVC burden  EKG 7/2021 showed sinus bradycardia 45 bpm.  Nonspecific ST-T wave changes  Echocardiogram 3/2019 showed EF of 55-60%.  No regional wall motion abnormalities " were noted.  He had 1+ mitral regurgitation and 1+ tricuspid regurgitation.  RV was normal in structure, function and size.  Aortic valve showed just trace to mild aortic regurgitation with aortic sclerosis.  Mean gradient was 4.6 mmHg (improved from previous) volume index is 46.7 mL/m  with LA dimension of 4.9 cm.  Cardiac MRI 10/2017 showed an EF of 51% with mild hypokinesis involving the basal      Plan:  Retrial amlodipine 5 mg daily for BP control  See me 12/2023 with blood work for amiodarone    Assessment/Plan:    H/o Persistent AFib  Dolores he was having increasing DELEON/SOB from recurrent AFib and underwent DCCV with amiodarone 8/2022  Amiodarone decreased 10/2022 given concern for weight gain and abnl TSH, then completely stopped per his wishes 1/2023.   He has started to feel poorly, with increased diaphoresis and weight gain/fluid retention. Complicating this is need for lung mass bx by IR, which will most likely require holding AC.  Echo 9/2023 with stable LVEF 49%    Reviewed that in the past, we have felt his atrial fibrillation was more asymptomatic, but he does tend to get fluid retention, increased DELEON and diaphoresis when he is in atrial fibrillation and feels like continuing in atrial fibrillation is not ideal. Therefore, have continued rhythm control strategy.  DCCV 9/16 unsuccessful, repeated while on amiodarone 10/6/2023 restored SR  EKG today confirms he has remained in SR    Confirmed that he has missed no doses of Xarelto therapy    PLAN:  Continue Xarelto. Must not stop x 4 weeks following DCCV 10/6, then OK to hold for any upcoming procedures given CHADSVASc 3 (HTN, age, aortic plaquing).  Happily, no biopsy is currently being planned    Continue to avoid alcohol  Continue amiodarone 200 mg daily for now.  He was in AF  2.5 months, so I do not want to stop this too early  See me 12/2023 with amiodarone labs    2.  HTN   BP has been elevated the last 2 visits, which could be contributing to  him feeling poorly   He notes weight gain, noting that he has not had the desire to exercise  Reviewed options for medication including spironolactone or amlodipine.  He would like to try amlodipine, though this was stopped 7/2021 as he thought it was contributing to gum swelling    PLAN:  Restart amlodipine 5 mg daily   Start checking BP at home and contact us if he is unable to tolerate this (had, swelling that he related to this in the past)   Would likely start spironolactone with follow-up blood work if unable to tolerate or if BP remains elevated     3. Mild cardiomyopathy, fluid retention  Unfortunately, had been retaining fluid, which is one of of his common complaints when in atrial fibrillation. Switching hydrochlorothiazide to Lasix during visit 9/2023 improved edema, but had to increase it x 5 days after DCCV 10/6   On exam, no significant LE edema.  JVD is up but lungs are clear    PLAN:  Continue furosemide 20 mg daily    4. Aortic Stenosis  Mild/moderate on echo 10/2022, stable on echo 9/2023    PLAN:  Continue routine follow-up with echoes (due 9/2024)      5.  On amiodarone therapy  Had been reduced and then stopped d/t bradycardia  Restarted 9/2023 after DCCV 9/2023 failed to restore SR. Repeat DCCV done 10/6/2023 while on amiodarone restored SR  Labs Dec 2022 and Jan 2023 looked good    PLAN:  Labs 12/2023, 3 m after starting amiodarone back    Crystal Campos PA-C, MSPAS      Orders Placed This Encounter   Procedures    Comprehensive metabolic panel    TSH with free T4 reflex    Follow-Up with Cardiology [facility]    EKG 12-lead complete w/read - Clinics (performed today)     Orders Placed This Encounter   Medications    amLODIPine (NORVASC) 5 MG tablet     Sig: Take 1 tablet (5 mg) by mouth daily     Dispense:  30 tablet     Refill:  5     There are no discontinued medications.        Encounter Diagnoses   Name Primary?    Essential hypertension Yes    On amiodarone therapy          CURRENT  "MEDICATIONS:  Current Outpatient Medications   Medication Sig Dispense Refill    allopurinol (ZYLOPRIM) 300 MG tablet Take 1 tablet (300 mg) by mouth daily 90 tablet 4    amiodarone (PACERONE) 200 MG tablet Take 2 tablets (400 mg) by mouth daily for 7 days, THEN 1 tablet (200 mg) daily. 44 tablet 3    amLODIPine (NORVASC) 5 MG tablet Take 1 tablet (5 mg) by mouth daily 30 tablet 5    cetirizine (ZYRTEC) 10 MG tablet Take 10 mg by mouth as needed for allergies      finasteride (PROPECIA) 1 MG tablet Take 1 tablet (1 mg) by mouth daily 90 tablet 4    furosemide (LASIX) 20 MG tablet Take 1 tablet (20 mg) by mouth daily 90 tablet 3    losartan (COZAAR) 100 MG tablet Take 1 tablet (100 mg) by mouth daily 90 tablet 4    potassium chloride ER (KLOR-CON M) 20 MEQ CR tablet Take 1 tablet (20 mEq) by mouth 2 times daily 180 tablet 3    rivaroxaban ANTICOAGULANT (XARELTO ANTICOAGULANT) 20 MG TABS tablet Take 1 tablet (20 mg) by mouth daily (with dinner) 90 tablet 3       ALLERGIES     Allergies   Allergen Reactions    No Known Drug Allergy          Review of Systems:  Skin:        Eyes:       ENT:       Respiratory:       Cardiovascular:       Gastroenterology:      Genitourinary:       Musculoskeletal:       Neurologic:       Psychiatric:       Heme/Lymph/Imm:       Endocrine:         Physical Exam:  Vitals: BP (!) 164/99   Pulse 67   Ht 1.727 m (5' 8\")   Wt 126.7 kg (279 lb 4.8 oz)   SpO2 93%   BMI 42.47 kg/m      Constitutional:  cooperative, alert and oriented, well developed, well nourished, in no acute distress        Skin:  warm and dry to the touch, no apparent skin lesions or masses noted        Head:  normocephalic, no masses or lesions        Eyes:  pupils equal and round;conjunctivae and lids unremarkable;sclera white        ENT:      Slight R mouth droopiness - Everett Palsy    Neck:    JVP elevated      Chest:  normal breath sounds, clear to auscultation, normal A-P diameter, normal symmetry, normal " respiratory excursion, no use of accessory muscles        Cardiac:   irregularly irregular rhythm       systolic ejection murmur;grade 2        Abdomen:  abdomen soft obese      Vascular: pulses full and equal                                      Extremities and Back:  no deformities, clubbing, cyanosis, erythema observed        Neurological:  no gross motor deficits            PAST MEDICAL HISTORY:  Past Medical History:   Diagnosis Date    Arthritis     gout corrected  taking allopurinol    Cancer (H)     basal nose    H/O ETOH abuse     Herpes simplex without mention of complication     Hyperlipidemia     Hypertension 2000    Obesity     Persistent atrial fibrillation (H)     persistent, DCCV 12/2017, ablation 11/6/17    Sleep apnea     CPAP    Tobacco use disorder     dced 6/2004       PAST SURGICAL HISTORY:  Past Surgical History:   Procedure Laterality Date    ABLATION OF DYSRHYTHMIC FOCUS  2018, 04/12/2019    Procedure: EP Ablation Focal AFIB;  Surgeon: Fady Day MD;  Location:  HEART CARDIAC CATH LAB    ACHILLES TENDON SURGERY  1997    ANESTHESIA CARDIOVERSION N/A 2/26/2019    Procedure: ANESTHESIA CARDIOVERSION (CONNER);  Surgeon: GENERIC ANESTHESIA PROVIDER;  Location:  OR    ANESTHESIA CARDIOVERSION N/A 11/19/2020    Procedure: ANESTHESIA, FOR CARDIOVERSION (dr campbell);  Surgeon: GENERIC ANESTHESIA PROVIDER;  Location:  OR    ANESTHESIA CARDIOVERSION N/A 8/22/2022    Procedure: CARDIOVERSION;  Surgeon: GENERIC ANESTHESIA PROVIDER;  Location:  OR    ANESTHESIA CARDIOVERSION N/A 9/13/2023    Procedure: Anesthesia cardioversion;  Surgeon: GENERIC ANESTHESIA PROVIDER;  Location:  OR    ANESTHESIA CARDIOVERSION N/A 10/6/2023    Procedure: Anesthesia cardioversion;  Surgeon: GENERIC ANESTHESIA PROVIDER;  Location:  OR    BASAL CELL CARCINOMA EXCISION Right 2009    nose, took cartilage from inner ear.    CARDIOVERSION  2012, 2019    cardioversion for atr fib    COLONOSCOPY  2008    COLONOSCOPY N/A  3/22/2023    Procedure: COLONOSCOPY, FLEXIBLE, WITH LESION REMOVAL USING SNARE;  Surgeon: Anne Lucero MD;  Location:  GI    EP ABLATION FOCAL AFIB N/A 2019    Procedure: EP Ablation Focal AFIB;  Surgeon: Fady Day MD;  Location:  HEART CARDIAC CATH LAB    HERNIA REPAIR, UMBILICAL  2012    Procedure: HERNIORRHAPHY UMBILICAL;  UMBILICAL HERNIA REPAIR;  Surgeon: Ra Crocker MD;  Location: [facility]    HERNIORRHAPHY UMBILICAL  2012    Procedure: HERNIORRHAPHY UMBILICAL;  UMBILICAL HERNIA REPAIR;  Surgeon: Ra Crocker MD;  Location: [facility]    ORTHOPEDIC SURGERY      ORTHOPEDIC SURGERY      OTHER SURGICAL HISTORY Right 2017    total KNEE ARTHROSCOPY     TOTAL KNEE ARTHROPLASTY Left 2018    Z NONSPECIFIC PROCEDURE  1997    achilles tendon    Z NONSPECIFIC PROCEDURE      knees, arthroscopy    Z NONSPECIFIC PROCEDURE      basal cell skin ca, nose    ZZ NONSPECIFIC PROCEDURE      flex sig; colonoscopy due 3/2008    ZZ NONSPECIFIC PROCEDURE      cardioversion for atr fib    ZZC TOTAL KNEE ARTHROPLASTY Left 2018    Dr. Malick Suarez       FAMILY HISTORY:  Family History   Problem Relation Age of Onset    Family History Negative Mother     Heart Disease Father         decesased at 42 - MI    Heart Disease Sister          MI    Lipids Sister     Lipids Sister     No Known Problems Sister     Heart Disease Brother          MI    Heart Disease Brother         CABG AT 49    Lung Cancer No family hx of        SOCIAL HISTORY:  Social History     Socioeconomic History    Marital status: Single     Spouse name: None    Number of children: None    Years of education: None    Highest education level: None   Tobacco Use    Smoking status: Former     Packs/day: 1.00     Years: 10.00     Additional pack years: 0.00     Total pack years: 10.00     Types: Cigarettes     Start date:      Quit date: 2011     Years since quittin.1    Smokeless  tobacco: Never    Tobacco comments:     quit 2011   Vaping Use    Vaping Use: Never used   Substance and Sexual Activity    Alcohol use: Not Currently    Drug use: Never    Sexual activity: Not Currently     Partners: Female     Birth control/protection: None   Social History Narrative    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

## 2023-10-26 ENCOUNTER — OFFICE VISIT (OUTPATIENT)
Dept: CARDIOLOGY | Facility: CLINIC | Age: 73
End: 2023-10-26
Payer: COMMERCIAL

## 2023-10-26 VITALS
HEART RATE: 67 BPM | DIASTOLIC BLOOD PRESSURE: 99 MMHG | OXYGEN SATURATION: 93 % | WEIGHT: 279.3 LBS | BODY MASS INDEX: 42.33 KG/M2 | HEIGHT: 68 IN | SYSTOLIC BLOOD PRESSURE: 164 MMHG

## 2023-10-26 DIAGNOSIS — Z79.899 ON AMIODARONE THERAPY: ICD-10-CM

## 2023-10-26 DIAGNOSIS — I10 ESSENTIAL HYPERTENSION: Primary | ICD-10-CM

## 2023-10-26 PROCEDURE — 93000 ELECTROCARDIOGRAM COMPLETE: CPT | Performed by: PHYSICIAN ASSISTANT

## 2023-10-26 PROCEDURE — 99214 OFFICE O/P EST MOD 30 MIN: CPT | Performed by: PHYSICIAN ASSISTANT

## 2023-10-26 RX ORDER — AMLODIPINE BESYLATE 5 MG/1
5 TABLET ORAL DAILY
Qty: 30 TABLET | Refills: 5 | Status: SHIPPED | OUTPATIENT
Start: 2023-10-26 | End: 2024-02-23

## 2023-10-26 NOTE — PATIENT INSTRUCTIONS
Brayden - it was good to see you again today!    1. Reviewed EKG showing normal rhythm!  2. Fluid-level seems better despite the lower dose of furosemide  3. Breathing is worse and episodes of lightheadedness. No stamina!      PLAN:    Start amlodipine 5 mg daily (AM or PM) to help with BP  CALL or send MyChart if gum issues come back on this and we'll start different BP medication  See me back with blood work Dec 2023 but CALL if issues! [phone]

## 2023-10-26 NOTE — LETTER
10/26/2023    Ranjan Courtney MD  [address] [facility] [address]    RE: Mike Schultz       Dear Colleague,     I had the pleasure of seeing Mike Schultz in the [facility].  [facility]    I had the pleasure of seeing Marty he came for follow up of AFib.  This 72 year old sees Dr. Day for his history of:    1. H/o persistent AFib. EF 50-55% 1/2022 - first dx'd in 2012. Failed flecainide (and noted scar on MRI). Underwent PVI and SVC isolation 11/2017. Repeat PVI and CTI for inducible AFlutter 4/2019. Started on Amio with DCCV 11/19/2020 for recurrent AFib in setting of increased alcohol use.   Amiodarone decreased 1/2021; back in persistent AFib 12/2021 and amiodarone stopped as felt asx'c.  This was restarted 8/2022, now s/p DCCV 8/22/2022. Amiodarone stopped again 1/2023. AFib restarted 7/2023 after EtOH use, now s/p DCCV (unsuccessful 9/13), then loaded with amiodarone s/p successful DCCV 10/6/2023.  Dr. Day recommended repeat AFib ablation if he started having AFib without it being triggered by EtOH.  2. Moderate Aortic Stenosis - echo 8/2023 with mean gradient 16 mmHg  3. Benign Essential HTN   4. BOWEN   5. Myocardiac Scar noted on cardiac MRI 10/2017, consistent with prior MI in the left Cx territory. EF 51%.  Avoiding flecainide use  6. H/o Alcohol dependence - no EtOH since 2017, then again 3-4 months in early 2022 after he was noted to be back in AFib.Back in AFib with EtOH use 7/2023  7. Chronic AC  On Xarelto for CHADSVASc 3 (HTN, age, aortic plaquing)  8. Bell's Palsy  9. Lung nodules- noted on CTA to follow-up on aorta 8/2023.     Last Visit & Interval History:  I saw Brayden 9/2023 at which time we reviewed plan for DCCV after going back into AFib, likely triggered by EtOH intake. I recommended restarting amiodarone followed by DCCV but he declined AAD therapy. I switched hydrochlorothiazide to Lasix for fluid retention, which is typically his major sx  "when in AFib.    DCCV 9/13 was unsuccessful in restoring SR. He agreed then to start amiodarone load (400 mg x 1 week, tn 200 mg daily). Repeat DCCV 10/6 successfully restored SR.  He sent a [employer] message noting continued DELEON despite maintenance of SR 10/9. I asked him to increase Lasix to 40 mg daily x 5 days, and KCL to 40 mEq BID x 5 days, and he wrote back 10/19 noting sxs had improved. We therefore went back to Lasix 20 mg daily and KCl 20 mEq BID at that time.     Today's Visit:  Brayden is pleased that he remains in SR.  He is tolerating amiodarone without too much trouble at this point as far as he is aware.    He notes increased furosemide x 5 really improved his edema and DELEON.  No orthopnea or PND.    Unfortunately, he Notes reduced stamina/energy despite normal rhythm.  Feels more lethargic. No fainting/falling.  Does not have the motivation to exercise, and knows that usually a huge part of his weight loss strategy.    BPs not checked at home.    VITALS:  Vitals: BP (!) 164/99   Pulse 67   Ht 1.727 m (5' 8\")   Wt 126.7 kg (279 lb 4.8 oz)   SpO2 93%   BMI 42.47 kg/m      Diagnostic Testing:  EKG today, which I overread, showed SR 63 bpm with PVC. QTc 450 ms on amiodarone 200 mg daily  EKG 9/2023 showed AFib 85 bpm  Echo 9/6/2023 showed LVEF 49%. Nl RV. Nl LA size. 1+MR. Mod AoS with mean gradient 16mmHg, Vmax 2.7 m/s, DI 0.31  Aorta 8/2023 with aneuryamsl dilation of suprarenal aorta (3.6x3.8). follow-up CTA showed no aneurysm  Echo 10/18/2022 with low-normal LVEF 50-55%. Nl RV. MIld-mod AoS with mean gradient 18 mmHg, Vmax 2.9 m/s, MARILYN 1.3 cm2, DI 0.36. Mildly dilated asc ao 4.3 com  Echo 1/10/69217 showed EF 50-55% LVSF mildly reduced. No RWMA. Nl RV. LA mildly dilated 4.1 cm with volume index 35.7ml/m2. Nl MV. Trace TR. Trace AI. MARILYN 1.4 cm2 with mean gradient 12.7mmHg c/w mild-mod AoS. Asc aorta mildly dilated 4.1 cm  Holter Monitor 12/29/2021 on no AVn blocking agents showed AFib with avg HR 81 " bpm (range 44--143 bpm). 2% PVC burden  EKG 7/2021 showed sinus bradycardia 45 bpm.  Nonspecific ST-T wave changes  Echocardiogram 3/2019 showed EF of 55-60%.  No regional wall motion abnormalities were noted.  He had 1+ mitral regurgitation and 1+ tricuspid regurgitation.  RV was normal in structure, function and size.  Aortic valve showed just trace to mild aortic regurgitation with aortic sclerosis.  Mean gradient was 4.6 mmHg (improved from previous) volume index is 46.7 mL/m  with LA dimension of 4.9 cm.  Cardiac MRI 10/2017 showed an EF of 51% with mild hypokinesis involving the basal      Plan:  Retrial amlodipine 5 mg daily for BP control  See me 12/2023 with blood work for amiodarone    Assessment/Plan:    H/o Persistent AFib  [place] he was having increasing DELEON/SOB from recurrent AFib and underwent DCCV with amiodarone 8/2022  Amiodarone decreased 10/2022 given concern for weight gain and abnl TSH, then completely stopped per his wishes 1/2023.   He has started to feel poorly, with increased diaphoresis and weight gain/fluid retention. Complicating this is need for lung mass bx by IR, which will most likely require holding AC.  Echo 9/2023 with stable LVEF 49%    Reviewed that in the past, we have felt his atrial fibrillation was more asymptomatic, but he does tend to get fluid retention, increased DELEON and diaphoresis when he is in atrial fibrillation and feels like continuing in atrial fibrillation is not ideal. Therefore, have continued rhythm control strategy.  DCCV 9/16 unsuccessful, repeated while on amiodarone 10/6/2023 restored SR  EKG today confirms he has remained in SR    Confirmed that he has missed no doses of Xarelto therapy    PLAN:  Continue Xarelto. Must not stop x 4 weeks following DCCV 10/6, then OK to hold for any upcoming procedures given CHADSVASc 3 (HTN, age, aortic plaquing).  Happily, no biopsy is currently being planned    Continue to avoid alcohol  Continue amiodarone 200 mg daily  for now.  He was in AF  2.5 months, so I do not want to stop this too early  See me 12/2023 with amiodarone labs    2.  HTN   BP has been elevated the last 2 visits, which could be contributing to him feeling poorly   He notes weight gain, noting that he has not had the desire to exercise  Reviewed options for medication including spironolactone or amlodipine.  He would like to try amlodipine, though this was stopped 7/2021 as he thought it was contributing to gum swelling    PLAN:  Restart amlodipine 5 mg daily   Start checking BP at home and contact us if he is unable to tolerate this (had, swelling that he related to this in the past)   Would likely start spironolactone with follow-up blood work if unable to tolerate or if BP remains elevated     3. Mild cardiomyopathy, fluid retention  Unfortunately, had been retaining fluid, which is one of of his common complaints when in atrial fibrillation. Switching hydrochlorothiazide to Lasix during visit 9/2023 improved edema, but had to increase it x 5 days after DCCV 10/6   On exam, no significant LE edema.  JVD is up but lungs are clear    PLAN:  Continue furosemide 20 mg daily    4. Aortic Stenosis  Mild/moderate on echo 10/2022, stable on echo 9/2023    PLAN:  Continue routine follow-up with echoes (due 9/2024)      5.  On amiodarone therapy  Had been reduced and then stopped d/t bradycardia  Restarted 9/2023 after DCCV 9/2023 failed to restore SR. Repeat DCCV done 10/6/2023 while on amiodarone restored SR  Labs Dec 2022 and Jan 2023 looked good    PLAN:  Labs 12/2023, 3 m after starting amiodarone back    Crystal Campos PA-C, MSPAS      Orders Placed This Encounter   Procedures    Comprehensive metabolic panel    TSH with free T4 reflex    Follow-Up with Cardiology OTTO    EKG 12-lead complete w/read - Clinics (performed today)     Orders Placed This Encounter   Medications    amLODIPine (NORVASC) 5 MG tablet     Sig: Take 1 tablet (5 mg) by mouth daily     Dispense:   "30 tablet     Refill:  5     There are no discontinued medications.        Encounter Diagnoses   Name Primary?    Essential hypertension Yes    On amiodarone therapy          CURRENT MEDICATIONS:  Current Outpatient Medications   Medication Sig Dispense Refill    allopurinol (ZYLOPRIM) 300 MG tablet Take 1 tablet (300 mg) by mouth daily 90 tablet 4    amiodarone (PACERONE) 200 MG tablet Take 2 tablets (400 mg) by mouth daily for 7 days, THEN 1 tablet (200 mg) daily. 44 tablet 3    amLODIPine (NORVASC) 5 MG tablet Take 1 tablet (5 mg) by mouth daily 30 tablet 5    cetirizine (ZYRTEC) 10 MG tablet Take 10 mg by mouth as needed for allergies      finasteride (PROPECIA) 1 MG tablet Take 1 tablet (1 mg) by mouth daily 90 tablet 4    furosemide (LASIX) 20 MG tablet Take 1 tablet (20 mg) by mouth daily 90 tablet 3    losartan (COZAAR) 100 MG tablet Take 1 tablet (100 mg) by mouth daily 90 tablet 4    potassium chloride ER (KLOR-CON M) 20 MEQ CR tablet Take 1 tablet (20 mEq) by mouth 2 times daily 180 tablet 3    rivaroxaban ANTICOAGULANT (XARELTO ANTICOAGULANT) 20 MG TABS tablet Take 1 tablet (20 mg) by mouth daily (with dinner) 90 tablet 3       ALLERGIES     Allergies   Allergen Reactions    No Known Drug Allergy          Review of Systems:  Skin:        Eyes:       ENT:       Respiratory:       Cardiovascular:       Gastroenterology:      Genitourinary:       Musculoskeletal:       Neurologic:       Psychiatric:       Heme/Lymph/Imm:       Endocrine:         Physical Exam:  Vitals: BP (!) 164/99   Pulse 67   Ht 1.727 m (5' 8\")   Wt 126.7 kg (279 lb 4.8 oz)   SpO2 93%   BMI 42.47 kg/m      Constitutional:  cooperative, alert and oriented, well developed, well nourished, in no acute distress        Skin:  warm and dry to the touch, no apparent skin lesions or masses noted        Head:  normocephalic, no masses or lesions        Eyes:  pupils equal and round;conjunctivae and lids unremarkable;sclera white        ENT:  "     Slight R mouth droopiness - Preble Palsy    Neck:    JVP elevated      Chest:  normal breath sounds, clear to auscultation, normal A-P diameter, normal symmetry, normal respiratory excursion, no use of accessory muscles        Cardiac:   irregularly irregular rhythm       systolic ejection murmur;grade 2        Abdomen:  abdomen soft obese      Vascular: pulses full and equal                                      Extremities and Back:  no deformities, clubbing, cyanosis, erythema observed        Neurological:  no gross motor deficits            PAST MEDICAL HISTORY:  Past Medical History:   Diagnosis Date    Arthritis     gout corrected  taking allopurinol    Cancer (H)     basal nose    H/O ETOH abuse     Herpes simplex without mention of complication     Hyperlipidemia     Hypertension 2000    Obesity     Persistent atrial fibrillation (H)     persistent, DCCV 12/2017, ablation 11/6/17    Sleep apnea     CPAP    Tobacco use disorder     dced 6/2004       PAST SURGICAL HISTORY:  Past Surgical History:   Procedure Laterality Date    ABLATION OF DYSRHYTHMIC FOCUS  2018, 04/12/2019    Procedure: EP Ablation Focal AFIB;  Surgeon: Fady Day MD;  Location:  HEART CARDIAC CATH LAB    ACHILLES TENDON SURGERY  1997    ANESTHESIA CARDIOVERSION N/A 2/26/2019    Procedure: ANESTHESIA CARDIOVERSION (CONNER);  Surgeon: GENERIC ANESTHESIA PROVIDER;  Location:  OR    ANESTHESIA CARDIOVERSION N/A 11/19/2020    Procedure: ANESTHESIA, FOR CARDIOVERSION (dr campbell);  Surgeon: GENERIC ANESTHESIA PROVIDER;  Location:  OR    ANESTHESIA CARDIOVERSION N/A 8/22/2022    Procedure: CARDIOVERSION;  Surgeon: GENERIC ANESTHESIA PROVIDER;  Location:  OR    ANESTHESIA CARDIOVERSION N/A 9/13/2023    Procedure: Anesthesia cardioversion;  Surgeon: GENERIC ANESTHESIA PROVIDER;  Location:  OR    ANESTHESIA CARDIOVERSION N/A 10/6/2023    Procedure: Anesthesia cardioversion;  Surgeon: GENERIC ANESTHESIA PROVIDER;  Location:  OR    [facility]  CELL CARCINOMA EXCISION Right     nose, took cartilage from inner ear.    CARDIOVERSION  ,     cardioversion for atr fib    COLONOSCOPY      COLONOSCOPY N/A 3/22/2023    Procedure: COLONOSCOPY, FLEXIBLE, WITH LESION REMOVAL USING SNARE;  Surgeon: Anne Lucero MD;  Location:  GI    EP ABLATION FOCAL AFIB N/A 2019    Procedure: EP Ablation Focal AFIB;  Surgeon: aFdy Day MD;  Location:  HEART CARDIAC CATH LAB    HERNIA REPAIR, UMBILICAL  2012    Procedure: HERNIORRHAPHY UMBILICAL;  UMBILICAL HERNIA REPAIR;  Surgeon: Ra Crocker MD;  Location: [facility]    HERNIORRHAPHY UMBILICAL  2012    Procedure: HERNIORRHAPHY UMBILICAL;  UMBILICAL HERNIA REPAIR;  Surgeon: Ra Crocker MD;  Location: [facility]    ORTHOPEDIC SURGERY      ORTHOPEDIC SURGERY      OTHER SURGICAL HISTORY Right 2017    total KNEE ARTHROSCOPY     TOTAL KNEE ARTHROPLASTY Left 2018    ZZC NONSPECIFIC PROCEDURE  1997    achilles tendon    ZZC NONSPECIFIC PROCEDURE      knees, arthroscopy    ZZC NONSPECIFIC PROCEDURE      basal cell skin ca, nose    ZZC NONSPECIFIC PROCEDURE      flex sig; colonoscopy due 3/2008    ZZC NONSPECIFIC PROCEDURE      cardioversion for atr fib    ZZC TOTAL KNEE ARTHROPLASTY Left 2018    Dr. Malick Suarez       FAMILY HISTORY:  Family History   Problem Relation Age of Onset    Family History Negative Mother     Heart Disease Father         decesased at 42 - MI    Heart Disease Sister          MI    Lipids Sister     Lipids Sister     No Known Problems Sister     Heart Disease Brother          MI    Heart Disease Brother         CABG AT 49    Lung Cancer No family hx of        SOCIAL HISTORY:  Social History     Socioeconomic History    Marital status: Single     Spouse name: None    Number of children: None    Years of education: None    Highest education level: None   Tobacco Use    Smoking status: Former     Packs/day: 1.00     Years: 10.00      Additional pack years: 0.00     Total pack years: 10.00     Types: Cigarettes     Start date:      Quit date: 2011     Years since quittin.1    Smokeless tobacco: Never    Tobacco comments:     quit 2011   Vaping Use    Vaping Use: Never used   Substance and Sexual Activity    Alcohol use: Not Currently    Drug use: Never    Sexual activity: Not Currently     Partners: Female     Birth control/protection: None   Social History Narrative    Worked in retail (-) and insurance sales     Spends some time on [place] on [place]               Thank you for allowing me to participate in the care of your patient.      Sincerely,     Natalie Campos PA-C     [facility]  cc:   No referring provider defined for this encounter.

## 2023-11-08 ENCOUNTER — OFFICE VISIT (OUTPATIENT)
Dept: DERMATOLOGY | Facility: CLINIC | Age: 73
End: 2023-11-08
Payer: COMMERCIAL

## 2023-11-08 DIAGNOSIS — L57.0 ACTINIC KERATOSIS: ICD-10-CM

## 2023-11-08 DIAGNOSIS — L57.8 ACTINIC SKIN DAMAGE: Primary | ICD-10-CM

## 2023-11-08 DIAGNOSIS — D22.9 MULTIPLE BENIGN NEVI: ICD-10-CM

## 2023-11-08 DIAGNOSIS — L81.4 LENTIGINES: ICD-10-CM

## 2023-11-08 PROCEDURE — 99203 OFFICE O/P NEW LOW 30 MIN: CPT | Mod: 25 | Performed by: STUDENT IN AN ORGANIZED HEALTH CARE EDUCATION/TRAINING PROGRAM

## 2023-11-08 PROCEDURE — 17003 DESTRUCT PREMALG LES 2-14: CPT | Performed by: STUDENT IN AN ORGANIZED HEALTH CARE EDUCATION/TRAINING PROGRAM

## 2023-11-08 PROCEDURE — 17000 DESTRUCT PREMALG LESION: CPT | Performed by: STUDENT IN AN ORGANIZED HEALTH CARE EDUCATION/TRAINING PROGRAM

## 2023-11-08 NOTE — PROGRESS NOTES
[facility] Dermatology Note    Encounter Date: Nov 8, 2023    Dermatology Problem List:  #Hx NMSC    Major PMHx  -   ______________________________________    Impression/Plan:  Brayden was seen today for derm problem.    Diagnoses and all orders for this visit:    Actinic skin damage  Lentigines  Multiple benign nevi  - Reviewed the compounding benefits of incremental changes to sun protective clothing behaviors including increased frequency of sunscreen and sun protective clothing like broad brimmed hats and longsleeved UPF containing clothing    Actinic keratosis  -     CA DESTRUCT PREMALIGNANT LESION, FIRST [9366177]  -     CA DESTRUCT PREMALIGNANT LESION, 2-14 [1901500]  -8 AK's on face  - See procedure note      Cryotherapy procedure note: After verbal consent and discussion of risks and benefits including but no limited to dyspigmentation/scar, blister, and pain, 8 aks on face was(were) treated with 1-2mm freeze border for 2 cycles with liquid nitrogen. Post cryotherapy instructions were provided.     Follow-up in 1 year .       Staff Involved:  Staff Only    Pedro Luis Romeo MD   of Dermatology  Department of Dermatology  [facility]      CC:   Chief Complaint   Patient presents with    Derm Problem     Skin check        History of Present Illness:  Mr. Mike Schultz is a 73 year old male who presents as a new patient.    Presents today for concerns about spots on his back and face.  He has not noticed them growing but he has trouble visualizing the areas and wants to have them checked    Labs:      Physical exam:  Vitals: There were no vitals taken for this visit.  GEN: well developed, well-nourished, in no acute distress, in a pleasant mood.     SKIN: Dowell phototype 1  - Full skin, which includes the head/face, both arms, chest, back, abdomen,both legs, genitalia and/or groin buttocks, digits and/or nails, was examined.  - Flat  brown macules and patches in a sun exposed areas on face and extremities  - scattered brown papules on trunk and extremities   - gritty pink papules on face   - No other lesions of concern on areas examined.     Past Medical History:   Past Medical History:   Diagnosis Date    Arthritis     gout corrected  taking allopurinol    Basal cell carcinoma     Cancer (H)     basal nose    H/O ETOH abuse     Herpes simplex without mention of complication     Hyperlipidemia     Hypertension 2000    Obesity     Persistent atrial fibrillation (H)     persistent, DCCV 12/2017, ablation 11/6/17    Sleep apnea     CPAP    Tobacco use disorder     dced 6/2004     Past Surgical History:   Procedure Laterality Date    ABLATION OF DYSRHYTHMIC FOCUS  2018, 04/12/2019    Procedure: EP Ablation Focal AFIB;  Surgeon: Fady Day MD;  Location:  HEART CARDIAC CATH LAB    ACHILLES TENDON SURGERY  1997    ANESTHESIA CARDIOVERSION N/A 2/26/2019    Procedure: ANESTHESIA CARDIOVERSION (CONNER);  Surgeon: GENERIC ANESTHESIA PROVIDER;  Location:  OR    ANESTHESIA CARDIOVERSION N/A 11/19/2020    Procedure: ANESTHESIA, FOR CARDIOVERSION (dr campbell);  Surgeon: GENERIC ANESTHESIA PROVIDER;  Location:  OR    ANESTHESIA CARDIOVERSION N/A 8/22/2022    Procedure: CARDIOVERSION;  Surgeon: GENERIC ANESTHESIA PROVIDER;  Location:  OR    ANESTHESIA CARDIOVERSION N/A 9/13/2023    Procedure: Anesthesia cardioversion;  Surgeon: GENERIC ANESTHESIA PROVIDER;  Location:  OR    ANESTHESIA CARDIOVERSION N/A 10/6/2023    Procedure: Anesthesia cardioversion;  Surgeon: GENERIC ANESTHESIA PROVIDER;  Location:  OR    BASAL CELL CARCINOMA EXCISION Right 2009    nose, took cartilage from inner ear.    CARDIOVERSION  2012, 2019    cardioversion for atr fib    COLONOSCOPY  2008    COLONOSCOPY N/A 3/22/2023    Procedure: COLONOSCOPY, FLEXIBLE, WITH LESION REMOVAL USING SNARE;  Surgeon: Anne Lucero MD;  Location:  GI    EP ABLATION FOCAL AFIB N/A  2019    Procedure: EP Ablation Focal AFIB;  Surgeon: Fady Day MD;  Location:  HEART CARDIAC CATH LAB    HERNIA REPAIR, UMBILICAL  2012    Procedure: HERNIORRHAPHY UMBILICAL;  UMBILICAL HERNIA REPAIR;  Surgeon: Ra Crocker MD;  Location: [facility]    HERNIORRHAPHY UMBILICAL  2012    Procedure: HERNIORRHAPHY UMBILICAL;  UMBILICAL HERNIA REPAIR;  Surgeon: Ra Crocker MD;  Location: [facility]    ORTHOPEDIC SURGERY      ORTHOPEDIC SURGERY      OTHER SURGICAL HISTORY Right 2017    total KNEE ARTHROSCOPY     TOTAL KNEE ARTHROPLASTY Left 2018    Z NONSPECIFIC PROCEDURE      achilles tendon    Z NONSPECIFIC PROCEDURE      knees, arthroscopy    Z NONSPECIFIC PROCEDURE      basal cell skin ca, nose    Z NONSPECIFIC PROCEDURE      flex sig; colonoscopy due 3/2008    Z NONSPECIFIC PROCEDURE      cardioversion for atr fib    ZC TOTAL KNEE ARTHROPLASTY Left 2018    Dr. Malick Suarez       Social History:   reports that he quit smoking about 12 years ago. His smoking use included cigarettes. He started smoking about 46 years ago. He has a 10.00 pack-year smoking history. He has never used smokeless tobacco. He reports that he does not currently use alcohol. He reports that he does not use drugs.    Family History:  Family History   Problem Relation Age of Onset    Family History Negative Mother     Heart Disease Father         decesased at 42 - MI    Heart Disease Sister          MI    Lipids Sister     Lipids Sister     No Known Problems Sister     Heart Disease Brother          MI    Heart Disease Brother         CABG AT 49    Lung Cancer No family hx of        Medications:  Current Outpatient Medications   Medication Sig Dispense Refill    allopurinol (ZYLOPRIM) 300 MG tablet Take 1 tablet (300 mg) by mouth daily 90 tablet 4    amiodarone (PACERONE) 200 MG tablet Take 2 tablets (400 mg) by mouth daily for 7 days, THEN 1 tablet (200 mg) daily. 44  tablet 3    amLODIPine (NORVASC) 5 MG tablet Take 1 tablet (5 mg) by mouth daily 30 tablet 5    cetirizine (ZYRTEC) 10 MG tablet Take 10 mg by mouth as needed for allergies      finasteride (PROPECIA) 1 MG tablet Take 1 tablet (1 mg) by mouth daily 90 tablet 4    furosemide (LASIX) 20 MG tablet Take 1 tablet (20 mg) by mouth daily 90 tablet 3    losartan (COZAAR) 100 MG tablet Take 1 tablet (100 mg) by mouth daily 90 tablet 4    potassium chloride ER (KLOR-CON M) 20 MEQ CR tablet Take 1 tablet (20 mEq) by mouth 2 times daily 180 tablet 3    rivaroxaban ANTICOAGULANT (XARELTO ANTICOAGULANT) 20 MG TABS tablet Take 1 tablet (20 mg) by mouth daily (with dinner) 90 tablet 3     Allergies   Allergen Reactions    No Known Drug Allergy

## 2023-11-08 NOTE — LETTER
11/8/2023         RE: Mike Schultz  [address]  [facility] [address]        Dear Colleague,    Thank you for referring your patient, Mike Schultz, to the [facility]. Please see a copy of my visit note below.    [facility] Dermatology Note    Encounter Date: Nov 8, 2023    Dermatology Problem List:  #Hx NMSC    Major PMHx  -   ______________________________________    Impression/Plan:  Brayden was seen today for derm problem.    Diagnoses and all orders for this visit:    Actinic skin damage  Lentigines  Multiple benign nevi  - Reviewed the compounding benefits of incremental changes to sun protective clothing behaviors including increased frequency of sunscreen and sun protective clothing like broad brimmed hats and longsleeved UPF containing clothing    Actinic keratosis  -     CA DESTRUCT PREMALIGNANT LESION, FIRST [[id number]]  -     CA DESTRUCT PREMALIGNANT LESION, 2-14 [[id number]]  -8 AK's on face  - See procedure note      Cryotherapy procedure note: After verbal consent and discussion of risks and benefits including but no limited to dyspigmentation/scar, blister, and pain, 8 aks on face was(were) treated with 1-2mm freeze border for 2 cycles with liquid nitrogen. Post cryotherapy instructions were provided.     Follow-up in 1 year .       Staff Involved:  Staff Only    Pedro Luis Romeo MD   of Dermatology  Department of Dermatology  [facility]      CC:   Chief Complaint   Patient presents with     Derm Problem     Skin check        History of Present Illness:  Mr. Mike Schultz is a 73 year old male who presents as a new patient.    Presents today for concerns about spots on his back and face.  He has not noticed them growing but he has trouble visualizing the areas and wants to have them checked    Labs:      Physical exam:  Vitals: There were no vitals taken for this visit.  GEN:  well developed, well-nourished, in no acute distress, in a pleasant mood.     SKIN: Dowell phototype 1  - Full skin, which includes the head/face, both arms, chest, back, abdomen,both legs, genitalia and/or groin buttocks, digits and/or nails, was examined.  - Flat brown macules and patches in a sun exposed areas on face and extremities  - scattered brown papules on trunk and extremities   - gritty pink papules on face   - No other lesions of concern on areas examined.     Past Medical History:   Past Medical History:   Diagnosis Date     Arthritis     gout corrected  taking allopurinol     Basal cell carcinoma      Cancer (H)     basal nose     H/O ETOH abuse      Herpes simplex without mention of complication      Hyperlipidemia      Hypertension 2000     Obesity      Persistent atrial fibrillation (H)     persistent, DCCV 12/2017, ablation 11/6/17     Sleep apnea     CPAP     Tobacco use disorder     dced 6/2004     Past Surgical History:   Procedure Laterality Date     ABLATION OF DYSRHYTHMIC FOCUS  2018, 04/12/2019    Procedure: EP Ablation Focal AFIB;  Surgeon: Fady Day MD;  Location:  HEART CARDI[facility] CATH LAB     ACHILLES TENDON SURGERY  1997     ANESTHESIA CARDIOVERSION N/A 2/26/2019    Procedure: ANESTHESIA CARDIOVERSION (CONNER);  Surgeon: GENERIC ANESTHESIA PROVIDER;  Location:  OR     ANESTHESIA CARDIOVERSION N/A 11/19/2020    Procedure: ANESTHESIA, FOR CARDIOVERSION (dr campbell);  Surgeon: GENERIC ANESTHESIA PROVIDER;  Location:  OR     ANESTHESIA CARDIOVERSION N/A 8/22/2022    Procedure: CARDIOVERSION;  Surgeon: GENERIC ANESTHESIA PROVIDER;  Location:  OR     ANESTHESIA CARDIOVERSION N/A 9/13/2023    Procedure: Anesthesia cardioversion;  Surgeon: GENERIC ANESTHESIA PROVIDER;  Location:  OR     ANESTHESIA CARDIOVERSION N/A 10/6/2023    Procedure: Anesthesia cardioversion;  Surgeon: GENERIC ANESTHESIA PROVIDER;  Location:  OR     BASAL CELL CARCINOMA EXCISION Right 2009    nose, took  cartilage from inner ear.     CARDIOVERSION  2019    cardioversion for atr fib     COLONOSCOPY       COLONOSCOPY N/A 3/22/2023    Procedure: COLONOSCOPY, FLEXIBLE, WITH LESION REMOVAL USING SNARE;  Surgeon: Anne Lucero MD;  Location:  GI     EP ABLATION FOCAL AFIB N/A 2019    Procedure: EP Ablation Focal AFIB;  Surgeon: Fady Day MD;  Location:  HEART CARDIAC CATH LAB     HERNIA REPAIR, UMBILICAL  2012    Procedure: HERNIORRHAPHY UMBILICAL;  UMBILICAL HERNIA REPAIR;  Surgeon: Ra Crocker MD;  Location: [facility]     HERNIORRHAPHY UMBILICAL  2012    Procedure: HERNIORRHAPHY UMBILICAL;  UMBILICAL HERNIA REPAIR;  Surgeon: Ra Crocker MD;  Location: [facility]     ORTHOPEDIC SURGERY       ORTHOPEDIC SURGERY       OTHER SURGICAL HISTORY Right 2017    total KNEE ARTHROSCOPY      TOTAL KNEE ARTHROPLASTY Left 2018     ZZ NONSPECIFIC PROCEDURE  1997    achilles tendon     ZZC NONSPECIFIC PROCEDURE      knees, arthroscopy     ZZC NONSPECIFIC PROCEDURE      basal cell skin ca, nose     ZZC NONSPECIFIC PROCEDURE      flex sig; colonoscopy due 3/2008     ZZC NONSPECIFIC PROCEDURE      cardioversion for atr fib     ZZC TOTAL KNEE ARTHROPLASTY Left 2018    Dr. Malick Suarez       Social History:   reports that he quit smoking about 12 years ago. His smoking use included cigarettes. He started smoking about 46 years ago. He has a 10.00 pack-year smoking history. He has never used smokeless tobacco. He reports that he does not currently use alcohol. He reports that he does not use drugs.    Family History:  Family History   Problem Relation Age of Onset     Family History Negative Mother      Heart Disease Father         decesased at 42 - MI     Heart Disease Sister          MI     Lipids Sister      Lipids Sister      No Known Problems Sister      Heart Disease Brother          MI     Heart Disease Brother         CABG AT 49     Lung Cancer No  family hx of        Medications:  Current Outpatient Medications   Medication Sig Dispense Refill     allopurinol (ZYLOPRIM) 300 MG tablet Take 1 tablet (300 mg) by mouth daily 90 tablet 4     amiodarone (PACERONE) 200 MG tablet Take 2 tablets (400 mg) by mouth daily for 7 days, THEN 1 tablet (200 mg) daily. 44 tablet 3     amLODIPine (NORVASC) 5 MG tablet Take 1 tablet (5 mg) by mouth daily 30 tablet 5     cetirizine (ZYRTEC) 10 MG tablet Take 10 mg by mouth as needed for allergies       finasteride (PROPECIA) 1 MG tablet Take 1 tablet (1 mg) by mouth daily 90 tablet 4     furosemide (LASIX) 20 MG tablet Take 1 tablet (20 mg) by mouth daily 90 tablet 3     losartan (COZAAR) 100 MG tablet Take 1 tablet (100 mg) by mouth daily 90 tablet 4     potassium chloride ER (KLOR-CON M) 20 MEQ CR tablet Take 1 tablet (20 mEq) by mouth 2 times daily 180 tablet 3     rivaroxaban ANTICOAGULANT (XARELTO ANTICOAGULANT) 20 MG TABS tablet Take 1 tablet (20 mg) by mouth daily (with dinner) 90 tablet 3     Allergies   Allergen Reactions     No Known Drug Allergy                Again, thank you for allowing me to participate in the care of your patient.        Sincerely,        Pedro Luis Romeo MD

## 2023-11-17 ENCOUNTER — DOCUMENTATION ONLY (OUTPATIENT)
Dept: CARDIOLOGY | Facility: CLINIC | Age: 73
End: 2023-11-17

## 2023-11-17 DIAGNOSIS — I48.19 PERSISTENT ATRIAL FIBRILLATION (H): Primary | ICD-10-CM

## 2023-11-17 DIAGNOSIS — I10 BENIGN ESSENTIAL HYPERTENSION: ICD-10-CM

## 2023-11-17 DIAGNOSIS — I48.19 PERSISTENT ATRIAL FIBRILLATION (H): ICD-10-CM

## 2023-11-17 PROCEDURE — 93000 ELECTROCARDIOGRAM COMPLETE: CPT | Performed by: PHYSICIAN ASSISTANT

## 2023-11-17 RX ORDER — FUROSEMIDE 20 MG
40 TABLET ORAL DAILY
Start: 2023-11-17 | End: 2023-12-11

## 2023-11-17 NOTE — PROGRESS NOTES
Spoke to patient in follow up to ambulatory EKG completed today and recommendations per TOBI Rivas:  Brayden's EKG looked fine ... NSR 62 bpm with acceptable intervals on Amiodarone 200 mg daily.     Unsure of why his HR is jumping around so much. Would continue amiodarone 200 mg DAILY at this point  Get 1 week ZioPatch in the next few weeks. Want to see exactly what rhythms are seen when he's having this SOB. I have ordered.  Continue Lasix 40 and KCl 20 mEq BID for now (he increased doses - I updated EPIC med list). When he comes in to get ZP placed pls also get non-fasting labs to check blood count, fluid level, kidneys/electroltyes (CBC, NTpBNP, BMP)    Patient provided verbal understanding regarding above.  Leadless monitor and labs scheduled 11/24.  PRABHA Palomo

## 2023-11-17 NOTE — PROGRESS NOTES
Brayden's EKG looked fine ... NSR 62 bpm with acceptable intervals on Amiodarone 200 mg daily.    Unsure of why his HR is jumping around so much. Would continue amiodarone 200 mg DAILY at this point  Get 1 week ZioPatch in the next few weeks. Want to see exactly what rhythms are seen when he's having this SOB. I have ordered.  Continue Lasix 40 and KCl 20 mEq BID for now (he increased doses - I updated EPIC med list). When he comes in to get ZP placed pls also get non-fasting labs to check blood count, fluid level, kidneys/electroltyes (CBC, NTpBNP, BMP)    Will try to figure out what's going on with HR and breathing!!    Crystal

## 2023-11-24 ENCOUNTER — DOCUMENTATION ONLY (OUTPATIENT)
Dept: CARDIOLOGY | Facility: CLINIC | Age: 73
End: 2023-11-24

## 2023-11-24 ENCOUNTER — HOSPITAL ENCOUNTER (OUTPATIENT)
Dept: CARDIOLOGY | Facility: CLINIC | Age: 73
Discharge: HOME | DRG: 196 | End: 2023-11-24
Attending: PHYSICIAN ASSISTANT | Admitting: PHYSICIAN ASSISTANT
Payer: COMMERCIAL

## 2023-11-24 ENCOUNTER — LAB (OUTPATIENT)
Dept: LAB | Facility: CLINIC | Age: 73
End: 2023-11-24
Payer: COMMERCIAL

## 2023-11-24 DIAGNOSIS — I10 ESSENTIAL HYPERTENSION: ICD-10-CM

## 2023-11-24 DIAGNOSIS — I10 BENIGN ESSENTIAL HYPERTENSION: ICD-10-CM

## 2023-11-24 DIAGNOSIS — I48.19 PERSISTENT ATRIAL FIBRILLATION (H): ICD-10-CM

## 2023-11-24 DIAGNOSIS — Z79.899 ON AMIODARONE THERAPY: ICD-10-CM

## 2023-11-24 LAB
ALBUMIN SERPL BCG-MCNC: 3.5 G/DL (ref 3.5–5.2)
ALP SERPL-CCNC: 104 U/L (ref 40–150)
ALT SERPL W P-5'-P-CCNC: 28 U/L (ref 0–70)
ANION GAP SERPL CALCULATED.3IONS-SCNC: 11 MMOL/L (ref 7–15)
AST SERPL W P-5'-P-CCNC: 32 U/L (ref 0–45)
BILIRUB SERPL-MCNC: 0.4 MG/DL
BUN SERPL-MCNC: 15.1 MG/DL (ref 8–23)
CALCIUM SERPL-MCNC: 9 MG/DL (ref 8.8–10.2)
CHLORIDE SERPL-SCNC: 107 MMOL/L (ref 98–107)
CREAT SERPL-MCNC: 0.89 MG/DL (ref 0.67–1.17)
DEPRECATED HCO3 PLAS-SCNC: 22 MMOL/L (ref 22–29)
EGFRCR SERPLBLD CKD-EPI 2021: >90 ML/MIN/1.73M2
ERYTHROCYTE [DISTWIDTH] IN BLOOD BY AUTOMATED COUNT: 13.3 % (ref 10–15)
GLUCOSE SERPL-MCNC: 108 MG/DL (ref 70–99)
HCT VFR BLD AUTO: 45.5 % (ref 40–53)
HGB BLD-MCNC: 14.8 G/DL (ref 13.3–17.7)
MCH RBC QN AUTO: 33.3 PG (ref 26.5–33)
MCHC RBC AUTO-ENTMCNC: 32.5 G/DL (ref 31.5–36.5)
MCV RBC AUTO: 103 FL (ref 78–100)
NT-PROBNP SERPL-MCNC: 274 PG/ML (ref 0–900)
PLATELET # BLD AUTO: 294 10E3/UL (ref 150–450)
POTASSIUM SERPL-SCNC: 4.2 MMOL/L (ref 3.4–5.3)
PROT SERPL-MCNC: 7.6 G/DL (ref 6.4–8.3)
RBC # BLD AUTO: 4.44 10E6/UL (ref 4.4–5.9)
SODIUM SERPL-SCNC: 140 MMOL/L (ref 135–145)
TSH SERPL DL<=0.005 MIU/L-ACNC: 2.67 UIU/ML (ref 0.3–4.2)
WBC # BLD AUTO: 11 10E3/UL (ref 4–11)

## 2023-11-24 PROCEDURE — 93227 XTRNL ECG REC<48 HR R&I: CPT | Performed by: INTERNAL MEDICINE

## 2023-11-24 PROCEDURE — 93242 EXT ECG>48HR<7D RECORDING: CPT

## 2023-11-24 PROCEDURE — 85027 COMPLETE CBC AUTOMATED: CPT | Performed by: PHYSICIAN ASSISTANT

## 2023-11-24 PROCEDURE — 84443 ASSAY THYROID STIM HORMONE: CPT | Performed by: PHYSICIAN ASSISTANT

## 2023-11-24 PROCEDURE — 83880 ASSAY OF NATRIURETIC PEPTIDE: CPT | Performed by: PHYSICIAN ASSISTANT

## 2023-11-24 PROCEDURE — 36415 COLL VENOUS BLD VENIPUNCTURE: CPT | Performed by: PHYSICIAN ASSISTANT

## 2023-11-24 PROCEDURE — 80053 COMPREHEN METABOLIC PANEL: CPT | Performed by: PHYSICIAN ASSISTANT

## 2023-11-24 NOTE — PROGRESS NOTES
"Please let Brayden know that I reviewed his blood work.      CBC with normal hemoglobin, NTpBNP without evidence of heart failure.  Amiodarone labs TSH/hepatic are normal.  Electrolytes and renal function are normal after increasing furosemide    Is he feeling better after I had him continue furosemide 40 and potassium chloride 20 mEq BID 11/17?  Any issues on the higher dose of diuretic?  I know he has a ZioPatch that was just placed, so I am hoping to catch what his rhythms are doing when his heart rate \"jumps around.\"  We can leave him on this dose of potassium/furosemide if he thinks it is helping his breathing, but if not, let me know    Crystal    Component      Latest Ref Rng 12/17/2022  12:19 PM 11/24/2023  12:15 PM   WBC      4.0 - 11.0 10e3/uL 7.5  11.0    RBC Count      4.40 - 5.90 10e6/uL 4.65  4.44    Hemoglobin      13.3 - 17.7 g/dL 15.5  14.8    Hematocrit      40.0 - 53.0 % 46.7  45.5    MCV      78 - 100 fL 100  103 (H)    MCH      26.5 - 33.0 pg 33.3 (H)  33.3 (H)    MCHC      31.5 - 36.5 g/dL 33.2  32.5    RDW      10.0 - 15.0 % 13.0  13.3    Platelet Count      150 - 450 10e3/uL 210  294      Component      Latest Ref Rng 11/24/2023  12:15 PM   N-Terminal Pro Bnp      0 - 900 pg/mL 274        Component      Latest Ref Rng 1/17/2023  12:34 PM 11/24/2023  12:15 PM   TSH      0.30 - 4.20 uIU/mL 2.53  2.67      Component      Latest Ref Rng 12/17/2022  12:19 PM 11/24/2023  12:15 PM   Alkaline Phosphatase      40 - 150 U/L 69  104    AST      0 - 45 U/L 34  32    ALT      0 - 70 U/L 32  28    Protein Total      6.4 - 8.3 g/dL 6.9  7.6    Albumin      3.4 - 5.0 g/dL 3.5     Bilirubin Total      <=1.2 mg/dL 0.7  0.4        Component      Latest Ref Rng 7/27/2023  9:55 AM 9/6/2023  8:36 AM 11/24/2023  12:15 PM   Sodium      135 - 145 mmol/L 142  141  140    Potassium      3.4 - 5.3 mmol/L 4.1  3.8  4.2    Carbon Dioxide (CO2)      22 - 29 mmol/L 27  33 (H)  22    Anion Gap      7 - 15 mmol/L 10  9  11    Urea " Nitrogen      8.0 - 23.0 mg/dL 21.1  19.2  15.1    Creatinine      0.67 - 1.17 mg/dL 0.97  1.05  0.89    GFR Estimate      >60 mL/min/1.73m2 83  76  >90    Calcium      8.8 - 10.2 mg/dL 8.9  8.6 (L)  9.0    Chloride      98 - 107 mmol/L 105  99  107    Glucose      70 - 99 mg/dL 116 (H)  123 (H)  108 (H)

## 2023-11-25 ENCOUNTER — APPOINTMENT (OUTPATIENT)
Dept: CT IMAGING | Facility: CLINIC | Age: 73
DRG: 196 | End: 2023-11-25
Attending: INTERNAL MEDICINE
Payer: COMMERCIAL

## 2023-11-25 ENCOUNTER — HOSPITAL ENCOUNTER (INPATIENT)
Facility: CLINIC | Age: 73
LOS: 6 days | Discharge: HOME | DRG: 196 | End: 2023-12-01
Attending: EMERGENCY MEDICINE | Admitting: INTERNAL MEDICINE
Payer: COMMERCIAL

## 2023-11-25 ENCOUNTER — APPOINTMENT (OUTPATIENT)
Dept: GENERAL RADIOLOGY | Facility: CLINIC | Age: 73
DRG: 196 | End: 2023-11-25
Attending: EMERGENCY MEDICINE
Payer: COMMERCIAL

## 2023-11-25 DIAGNOSIS — J96.01 ACUTE RESPIRATORY FAILURE WITH HYPOXIA (H): ICD-10-CM

## 2023-11-25 LAB
ALBUMIN SERPL BCG-MCNC: 3.5 G/DL (ref 3.5–5.2)
ALP SERPL-CCNC: 98 U/L (ref 40–150)
ALT SERPL W P-5'-P-CCNC: 36 U/L (ref 0–70)
ANION GAP SERPL CALCULATED.3IONS-SCNC: 12 MMOL/L (ref 7–15)
APAP SERPL-MCNC: 10.3 UG/ML (ref 10–30)
AST SERPL W P-5'-P-CCNC: 41 U/L (ref 0–45)
ATRIAL RATE - MUSE: 69 BPM
BASOPHILS # BLD AUTO: 0 10E3/UL (ref 0–0.2)
BASOPHILS NFR BLD AUTO: 0 %
BILIRUB SERPL-MCNC: 0.4 MG/DL
BUN SERPL-MCNC: 17.5 MG/DL (ref 8–23)
CALCIUM SERPL-MCNC: 8.7 MG/DL (ref 8.8–10.2)
CHLORIDE SERPL-SCNC: 102 MMOL/L (ref 98–107)
CREAT SERPL-MCNC: 0.87 MG/DL (ref 0.67–1.17)
CRP SERPL-MCNC: 142.41 MG/L
CRP SERPL-MCNC: 151.6 MG/L
DEPRECATED HCO3 PLAS-SCNC: 23 MMOL/L (ref 22–29)
DIASTOLIC BLOOD PRESSURE - MUSE: NORMAL MMHG
EGFRCR SERPLBLD CKD-EPI 2021: >90 ML/MIN/1.73M2
EOSINOPHIL # BLD AUTO: 0.1 10E3/UL (ref 0–0.7)
EOSINOPHIL NFR BLD AUTO: 1 %
ERYTHROCYTE [DISTWIDTH] IN BLOOD BY AUTOMATED COUNT: 13.3 % (ref 10–15)
FLUAV RNA SPEC QL NAA+PROBE: NEGATIVE
FLUBV RNA RESP QL NAA+PROBE: NEGATIVE
GLUCOSE SERPL-MCNC: 115 MG/DL (ref 70–99)
HCO3 BLDV-SCNC: 28 MMOL/L (ref 21–28)
HCT VFR BLD AUTO: 42.2 % (ref 40–53)
HGB BLD-MCNC: 14.1 G/DL (ref 13.3–17.7)
IMM GRANULOCYTES # BLD: 0 10E3/UL
IMM GRANULOCYTES NFR BLD: 0 %
INR PPP: 2.62 (ref 0.85–1.15)
INTERPRETATION ECG - MUSE: NORMAL
LACTATE BLD-SCNC: 0.6 MMOL/L
LYMPHOCYTES # BLD AUTO: 1.8 10E3/UL (ref 0.8–5.3)
LYMPHOCYTES NFR BLD AUTO: 14 %
MAGNESIUM SERPL-MCNC: 1.8 MG/DL (ref 1.7–2.3)
MCH RBC QN AUTO: 33.2 PG (ref 26.5–33)
MCHC RBC AUTO-ENTMCNC: 33.4 G/DL (ref 31.5–36.5)
MCV RBC AUTO: 99 FL (ref 78–100)
MONOCYTES # BLD AUTO: 1.2 10E3/UL (ref 0–1.3)
MONOCYTES NFR BLD AUTO: 10 %
NEUTROPHILS # BLD AUTO: 9 10E3/UL (ref 1.6–8.3)
NEUTROPHILS NFR BLD AUTO: 75 %
NRBC # BLD AUTO: 0 10E3/UL
NRBC BLD AUTO-RTO: 0 /100
NT-PROBNP SERPL-MCNC: 544 PG/ML (ref 0–900)
P AXIS - MUSE: 58 DEGREES
PCO2 BLDV: 44 MM HG (ref 40–50)
PH BLDV: 7.42 [PH] (ref 7.32–7.43)
PLATELET # BLD AUTO: 303 10E3/UL (ref 150–450)
PO2 BLDV: 29 MM HG (ref 25–47)
POTASSIUM SERPL-SCNC: 4.1 MMOL/L (ref 3.4–5.3)
PR INTERVAL - MUSE: 204 MS
PROCALCITONIN SERPL IA-MCNC: 0.14 NG/ML
PROT SERPL-MCNC: 7.5 G/DL (ref 6.4–8.3)
QRS DURATION - MUSE: 92 MS
QT - MUSE: 450 MS
QTC - MUSE: 482 MS
R AXIS - MUSE: 57 DEGREES
RBC # BLD AUTO: 4.25 10E6/UL (ref 4.4–5.9)
RSV RNA SPEC NAA+PROBE: NEGATIVE
SAO2 % BLDV: 55 % (ref 94–100)
SARS-COV-2 RNA RESP QL NAA+PROBE: NEGATIVE
SODIUM SERPL-SCNC: 137 MMOL/L (ref 135–145)
SYSTOLIC BLOOD PRESSURE - MUSE: NORMAL MMHG
T AXIS - MUSE: 84 DEGREES
TROPONIN T SERPL HS-MCNC: 23 NG/L
TROPONIN T SERPL HS-MCNC: 26 NG/L
VENTRICULAR RATE- MUSE: 69 BPM
WBC # BLD AUTO: 12.2 10E3/UL (ref 4–11)

## 2023-11-25 PROCEDURE — 210N000002 HC R&B HEART CARE

## 2023-11-25 PROCEDURE — 36415 COLL VENOUS BLD VENIPUNCTURE: CPT | Performed by: EMERGENCY MEDICINE

## 2023-11-25 PROCEDURE — 80053 COMPREHEN METABOLIC PANEL: CPT | Performed by: EMERGENCY MEDICINE

## 2023-11-25 PROCEDURE — 82803 BLOOD GASES ANY COMBINATION: CPT

## 2023-11-25 PROCEDURE — 87637 SARSCOV2&INF A&B&RSV AMP PRB: CPT | Performed by: EMERGENCY MEDICINE

## 2023-11-25 PROCEDURE — 84145 PROCALCITONIN (PCT): CPT | Performed by: INTERNAL MEDICINE

## 2023-11-25 PROCEDURE — 93005 ELECTROCARDIOGRAM TRACING: CPT

## 2023-11-25 PROCEDURE — 85025 COMPLETE CBC W/AUTO DIFF WBC: CPT | Performed by: EMERGENCY MEDICINE

## 2023-11-25 PROCEDURE — 87389 HIV-1 AG W/HIV-1&-2 AB AG IA: CPT | Performed by: INTERNAL MEDICINE

## 2023-11-25 PROCEDURE — 96374 THER/PROPH/DIAG INJ IV PUSH: CPT

## 2023-11-25 PROCEDURE — 71250 CT THORAX DX C-: CPT

## 2023-11-25 PROCEDURE — 83605 ASSAY OF LACTIC ACID: CPT

## 2023-11-25 PROCEDURE — 83735 ASSAY OF MAGNESIUM: CPT | Performed by: EMERGENCY MEDICINE

## 2023-11-25 PROCEDURE — 80143 DRUG ASSAY ACETAMINOPHEN: CPT | Performed by: EMERGENCY MEDICINE

## 2023-11-25 PROCEDURE — 86140 C-REACTIVE PROTEIN: CPT | Performed by: INTERNAL MEDICINE

## 2023-11-25 PROCEDURE — 87040 BLOOD CULTURE FOR BACTERIA: CPT | Performed by: EMERGENCY MEDICINE

## 2023-11-25 PROCEDURE — 250N000011 HC RX IP 250 OP 636: Mod: JZ | Performed by: EMERGENCY MEDICINE

## 2023-11-25 PROCEDURE — 93005 ELECTROCARDIOGRAM TRACING: CPT | Mod: 76

## 2023-11-25 PROCEDURE — 99223 1ST HOSP IP/OBS HIGH 75: CPT | Performed by: INTERNAL MEDICINE

## 2023-11-25 PROCEDURE — 85610 PROTHROMBIN TIME: CPT | Performed by: EMERGENCY MEDICINE

## 2023-11-25 PROCEDURE — 71046 X-RAY EXAM CHEST 2 VIEWS: CPT

## 2023-11-25 PROCEDURE — 99285 EMERGENCY DEPT VISIT HI MDM: CPT | Mod: 25

## 2023-11-25 PROCEDURE — 83880 ASSAY OF NATRIURETIC PEPTIDE: CPT | Performed by: EMERGENCY MEDICINE

## 2023-11-25 PROCEDURE — 84484 ASSAY OF TROPONIN QUANT: CPT | Performed by: INTERNAL MEDICINE

## 2023-11-25 PROCEDURE — 84484 ASSAY OF TROPONIN QUANT: CPT | Performed by: EMERGENCY MEDICINE

## 2023-11-25 RX ORDER — FUROSEMIDE 10 MG/ML
60 INJECTION INTRAMUSCULAR; INTRAVENOUS ONCE
Status: COMPLETED | OUTPATIENT
Start: 2023-11-25 | End: 2023-11-25

## 2023-11-25 RX ADMIN — FUROSEMIDE 60 MG: 10 INJECTION, SOLUTION INTRAMUSCULAR; INTRAVENOUS at 21:04

## 2023-11-25 ASSESSMENT — ACTIVITIES OF DAILY LIVING (ADL)
ADLS_ACUITY_SCORE: 35

## 2023-11-26 LAB
ALBUMIN SERPL BCG-MCNC: 3.6 G/DL (ref 3.5–5.2)
ALP SERPL-CCNC: 95 U/L (ref 40–150)
ALT SERPL W P-5'-P-CCNC: 32 U/L (ref 0–70)
APAP SERPL-MCNC: <5 UG/ML (ref 10–30)
AST SERPL W P-5'-P-CCNC: 34 U/L (ref 0–45)
BILIRUB DIRECT SERPL-MCNC: <0.2 MG/DL (ref 0–0.3)
BILIRUB SERPL-MCNC: 0.7 MG/DL
GLUCOSE BLDC GLUCOMTR-MCNC: 160 MG/DL (ref 70–99)
INR PPP: 2.1 (ref 0.85–1.15)
PROT SERPL-MCNC: 7.6 G/DL (ref 6.4–8.3)

## 2023-11-26 PROCEDURE — 210N000002 HC R&B HEART CARE

## 2023-11-26 PROCEDURE — 99418 PROLNG IP/OBS E/M EA 15 MIN: CPT | Performed by: INTERNAL MEDICINE

## 2023-11-26 PROCEDURE — 99223 1ST HOSP IP/OBS HIGH 75: CPT | Performed by: INTERNAL MEDICINE

## 2023-11-26 PROCEDURE — 250N000012 HC RX MED GY IP 250 OP 636 PS 637: Performed by: INTERNAL MEDICINE

## 2023-11-26 PROCEDURE — 87449 NOS EACH ORGANISM AG IA: CPT | Performed by: INTERNAL MEDICINE

## 2023-11-26 PROCEDURE — 80143 DRUG ASSAY ACETAMINOPHEN: CPT | Performed by: INTERNAL MEDICINE

## 2023-11-26 PROCEDURE — 85610 PROTHROMBIN TIME: CPT | Performed by: INTERNAL MEDICINE

## 2023-11-26 PROCEDURE — 36415 COLL VENOUS BLD VENIPUNCTURE: CPT | Performed by: INTERNAL MEDICINE

## 2023-11-26 PROCEDURE — 80076 HEPATIC FUNCTION PANEL: CPT | Performed by: INTERNAL MEDICINE

## 2023-11-26 PROCEDURE — 250N000013 HC RX MED GY IP 250 OP 250 PS 637: Performed by: INTERNAL MEDICINE

## 2023-11-26 PROCEDURE — 99222 1ST HOSP IP/OBS MODERATE 55: CPT | Performed by: STUDENT IN AN ORGANIZED HEALTH CARE EDUCATION/TRAINING PROGRAM

## 2023-11-26 PROCEDURE — 99233 SBSQ HOSP IP/OBS HIGH 50: CPT | Performed by: INTERNAL MEDICINE

## 2023-11-26 RX ORDER — PROCHLORPERAZINE MALEATE 5 MG
5 TABLET ORAL EVERY 6 HOURS PRN
Status: DISCONTINUED | OUTPATIENT
Start: 2023-11-26 | End: 2023-12-01 | Stop reason: HOSPADM

## 2023-11-26 RX ORDER — BISACODYL 10 MG
10 SUPPOSITORY, RECTAL RECTAL DAILY PRN
Status: DISCONTINUED | OUTPATIENT
Start: 2023-11-26 | End: 2023-12-01 | Stop reason: HOSPADM

## 2023-11-26 RX ORDER — BENZONATATE 100 MG/1
100 CAPSULE ORAL 3 TIMES DAILY PRN
Status: DISCONTINUED | OUTPATIENT
Start: 2023-11-26 | End: 2023-12-01 | Stop reason: HOSPADM

## 2023-11-26 RX ORDER — LOSARTAN POTASSIUM 100 MG/1
100 TABLET ORAL DAILY
Status: DISCONTINUED | OUTPATIENT
Start: 2023-11-26 | End: 2023-12-01 | Stop reason: HOSPADM

## 2023-11-26 RX ORDER — PREDNISONE 20 MG/1
40 TABLET ORAL DAILY
Status: DISCONTINUED | OUTPATIENT
Start: 2023-11-27 | End: 2023-11-27

## 2023-11-26 RX ORDER — LIDOCAINE 40 MG/G
CREAM TOPICAL
Status: DISCONTINUED | OUTPATIENT
Start: 2023-11-26 | End: 2023-11-26

## 2023-11-26 RX ORDER — AMLODIPINE BESYLATE 5 MG/1
5 TABLET ORAL DAILY
Status: DISCONTINUED | OUTPATIENT
Start: 2023-11-26 | End: 2023-12-01 | Stop reason: HOSPADM

## 2023-11-26 RX ORDER — FUROSEMIDE 40 MG
40 TABLET ORAL DAILY
Status: DISCONTINUED | OUTPATIENT
Start: 2023-11-26 | End: 2023-12-01 | Stop reason: HOSPADM

## 2023-11-26 RX ORDER — CALCIUM CARBONATE 500 MG/1
1000 TABLET, CHEWABLE ORAL 4 TIMES DAILY PRN
Status: DISCONTINUED | OUTPATIENT
Start: 2023-11-26 | End: 2023-12-01 | Stop reason: HOSPADM

## 2023-11-26 RX ORDER — ONDANSETRON 2 MG/ML
4 INJECTION INTRAMUSCULAR; INTRAVENOUS EVERY 6 HOURS PRN
Status: DISCONTINUED | OUTPATIENT
Start: 2023-11-26 | End: 2023-12-01 | Stop reason: HOSPADM

## 2023-11-26 RX ORDER — AMOXICILLIN 250 MG
2 CAPSULE ORAL 2 TIMES DAILY PRN
Status: DISCONTINUED | OUTPATIENT
Start: 2023-11-26 | End: 2023-12-01 | Stop reason: HOSPADM

## 2023-11-26 RX ORDER — FINASTERIDE 1 MG/1
1 TABLET, FILM COATED ORAL DAILY
Status: DISCONTINUED | OUTPATIENT
Start: 2023-11-26 | End: 2023-12-01 | Stop reason: HOSPADM

## 2023-11-26 RX ORDER — PREDNISONE 20 MG/1
40 TABLET ORAL DAILY
Status: DISCONTINUED | OUTPATIENT
Start: 2023-11-26 | End: 2023-11-26

## 2023-11-26 RX ORDER — PROCHLORPERAZINE 25 MG
12.5 SUPPOSITORY, RECTAL RECTAL EVERY 12 HOURS PRN
Status: DISCONTINUED | OUTPATIENT
Start: 2023-11-26 | End: 2023-12-01 | Stop reason: HOSPADM

## 2023-11-26 RX ORDER — ONDANSETRON 4 MG/1
4 TABLET, ORALLY DISINTEGRATING ORAL EVERY 6 HOURS PRN
Status: DISCONTINUED | OUTPATIENT
Start: 2023-11-26 | End: 2023-12-01 | Stop reason: HOSPADM

## 2023-11-26 RX ORDER — POLYETHYLENE GLYCOL 3350 17 G/17G
17 POWDER, FOR SOLUTION ORAL 2 TIMES DAILY PRN
Status: DISCONTINUED | OUTPATIENT
Start: 2023-11-26 | End: 2023-12-01 | Stop reason: HOSPADM

## 2023-11-26 RX ORDER — POTASSIUM CHLORIDE 1500 MG/1
20 TABLET, EXTENDED RELEASE ORAL 2 TIMES DAILY
Status: DISCONTINUED | OUTPATIENT
Start: 2023-11-26 | End: 2023-12-01 | Stop reason: HOSPADM

## 2023-11-26 RX ORDER — ALLOPURINOL 300 MG/1
300 TABLET ORAL DAILY
Status: DISCONTINUED | OUTPATIENT
Start: 2023-11-26 | End: 2023-12-01 | Stop reason: HOSPADM

## 2023-11-26 RX ORDER — LIDOCAINE 40 MG/G
CREAM TOPICAL
Status: DISCONTINUED | OUTPATIENT
Start: 2023-11-26 | End: 2023-11-27

## 2023-11-26 RX ORDER — AMOXICILLIN 250 MG
1 CAPSULE ORAL 2 TIMES DAILY PRN
Status: DISCONTINUED | OUTPATIENT
Start: 2023-11-26 | End: 2023-12-01 | Stop reason: HOSPADM

## 2023-11-26 RX ORDER — METOPROLOL TARTRATE 1 MG/ML
2.5 INJECTION, SOLUTION INTRAVENOUS EVERY 4 HOURS PRN
Status: DISCONTINUED | OUTPATIENT
Start: 2023-11-26 | End: 2023-12-01 | Stop reason: HOSPADM

## 2023-11-26 RX ADMIN — FINASTERIDE 1 MG: 1 TABLET, FILM COATED ORAL at 08:44

## 2023-11-26 RX ADMIN — POTASSIUM CHLORIDE 20 MEQ: 1500 TABLET, EXTENDED RELEASE ORAL at 00:46

## 2023-11-26 RX ADMIN — RIVAROXABAN 20 MG: 20 TABLET, FILM COATED ORAL at 17:41

## 2023-11-26 RX ADMIN — ALLOPURINOL 300 MG: 300 TABLET ORAL at 08:44

## 2023-11-26 RX ADMIN — FUROSEMIDE 40 MG: 40 TABLET ORAL at 08:44

## 2023-11-26 RX ADMIN — BENZONATATE 100 MG: 100 CAPSULE ORAL at 12:27

## 2023-11-26 RX ADMIN — PREDNISONE 40 MG: 20 TABLET ORAL at 01:48

## 2023-11-26 RX ADMIN — AMLODIPINE BESYLATE 5 MG: 5 TABLET ORAL at 08:44

## 2023-11-26 RX ADMIN — POTASSIUM CHLORIDE 20 MEQ: 1500 TABLET, EXTENDED RELEASE ORAL at 20:53

## 2023-11-26 RX ADMIN — LOSARTAN POTASSIUM 100 MG: 100 TABLET, FILM COATED ORAL at 08:44

## 2023-11-26 ASSESSMENT — ACTIVITIES OF DAILY LIVING (ADL)
ADLS_ACUITY_SCORE: 18

## 2023-11-26 NOTE — ED PROVIDER NOTES
"  History     Chief Complaint:  Shortness of Breath       HPI   Mike Schultz is a 73 year old male With a history of atrial fibrillation on Eliquis who comes today for dyspnea on exertion.  He reports that for the past month or so he is felt quite short of breath with any exertion.  He reports that he has had several cardioversions and thought that once the atrial fibrillation was under control that he would feel better but he has not had its gotten worse.  He wonders if amlodipine may be contributing to this.  He states that he sees Crystal Campos and Dr. Day from cardiology.  He is also had a cough and he has been \"sipping on DayQuil\" for the past week when he would breathe heavier and has been consuming an entire bottle every ~1.5 days.  He is not sure if he has had any fevers.  He took a home COVID test and this was negative.  He states that he still urinating copiously in response to diuretic.  He reports that on the past he was on 2 diuretic pills but he was switched back to 1.  He does not note any significant weight gain.  He is not normally on oxygen.  Patient reports no history of cancer, DVT/PE, hemoptysis, current smoking, hormone use, lower extremity symptoms, recent immobilizations.    Independent Historian:   None - Patient Only    Review of External Notes:   Reviewed outpatient cardiology notes.  Patient underwent synchronized cardioversion on 10/6/2023 by Dr. Marinelli.    Reviewed note from Dr. Mckeon from the cancer care nodule clinic on 9/14/2023.  He was referred for lung nodules seen on the CT scan done to evaluate his aorta.  There is no significant change.  Per the note, physician assessed risk of malignancy on the [place] lung nodule calculator was 20.6%.  Given difficulty in biopsy due to location, follow-up CT was planned for 2 months.    Reviewed note from Crystal Campos from the previous day.  Note reports normal hemoglobin and BNP without evidence of heart failure.  Amiodarone labs, TSH and " hepatic labs were reported to be normal as well as electrolytes and renal function.  He was instructed to remain on furosemide 40 mg and potassium chloride 20 mill equivalents twice daily.    Medications:    No current outpatient medications on file.    Past Medical History:    Past Medical History:   Diagnosis Date    Arthritis     Basal cell carcinoma     Cancer (H)     H/O ETOH abuse     Herpes simplex without mention of complication     Hyperlipidemia     Hypertension 2000    Obesity     Persistent atrial fibrillation (H)     Sleep apnea     Tobacco use disorder        Past Surgical History:    Past Surgical History:   Procedure Laterality Date    ABLATION OF DYSRHYTHMIC FOCUS  2018, 04/12/2019    Procedure: EP Ablation Focal AFIB;  Surgeon: Fady Day MD;  Location:  [facility]    ACHILLES TENDON SURGERY  1997    ANESTHESIA CARDIOVERSION N/A 2/26/2019    Procedure: ANESTHESIA CARDIOVERSION (CONNER);  Surgeon: GENERIC ANESTHESIA PROVIDER;  Location:  OR    ANESTHESIA CARDIOVERSION N/A 11/19/2020    Procedure: ANESTHESIA, FOR CARDIOVERSION (dr campbell);  Surgeon: GENERIC ANESTHESIA PROVIDER;  Location:  OR    ANESTHESIA CARDIOVERSION N/A 8/22/2022    Procedure: CARDIOVERSION;  Surgeon: GENERIC ANESTHESIA PROVIDER;  Location:  OR    ANESTHESIA CARDIOVERSION N/A 9/13/2023    Procedure: Anesthesia cardioversion;  Surgeon: GENERIC ANESTHESIA PROVIDER;  Location:  OR    ANESTHESIA CARDIOVERSION N/A 10/6/2023    Procedure: Anesthesia cardioversion;  Surgeon: GENERIC ANESTHESIA PROVIDER;  Location:  OR    BASAL CELL CARCINOMA EXCISION Right 2009    nose, took cartilage from inner ear.    CARDIOVERSION  2012, 2019    cardioversion for atr fib    COLONOSCOPY  2008    COLONOSCOPY N/A 3/22/2023    Procedure: COLONOSCOPY, FLEXIBLE, WITH LESION REMOVAL USING SNARE;  Surgeon: Anne Lucero MD;  Location:  GI    EP ABLATION FOCAL AFIB N/A 4/12/2019    Procedure: EP Ablation Focal AFIB;   "Surgeon: Fady Day MD;  Location:  [facility]    HERNIA REPAIR, UMBILICAL  08/20/2012    Procedure: HERNIORRHAPHY UMBILICAL;  UMBILICAL HERNIA REPAIR;  Surgeon: Ra Crocker MD;  Location: [facility]    HERNIORRHAPHY UMBILICAL  8/20/2012    Procedure: HERNIORRHAPHY UMBILICAL;  UMBILICAL HERNIA REPAIR;  Surgeon: Ra Crocker MD;  Location: [facility]    ORTHOPEDIC SURGERY      ORTHOPEDIC SURGERY      OTHER SURGICAL HISTORY Right 03/2017    total KNEE ARTHROSCOPY     TOTAL KNEE ARTHROPLASTY Left 03/16/2018    [facility] NONSPECIFIC PROCEDURE  1997    achilles tendon    [facility] NONSPECIFIC PROCEDURE      knees, arthroscopy    [facility] NONSPECIFIC PROCEDURE      basal cell skin ca, nose    [facility] NONSPECIFIC PROCEDURE      flex sig; colonoscopy due 3/2008    [facility] NONSPECIFIC PROCEDURE  2012    cardioversion for atr fib    [facility] TOTAL KNEE ARTHROPLASTY Left 03/16/2018    Dr. Malick Suarez        Physical Exam   Patient Vitals for the past 24 hrs:   BP Temp Temp src Pulse Resp SpO2 Height Weight   11/26/23 0038 133/76 -- -- 74 16 95 % -- --   11/26/23 0015 131/85 97.5  F (36.4  C) Axillary 75 26 91 % 1.727 m (5' 8\") 124.3 kg (274 lb)   11/25/23 2343 -- -- -- -- -- 93 % -- --   11/25/23 2315 126/79 -- -- 68 24 92 % -- --   11/25/23 2125 -- -- -- 73 25 91 % -- --   11/25/23 1934 -- -- -- 73 27 91 % -- --   11/25/23 1904 -- -- -- -- -- (!) 84 % -- --   11/25/23 1900 (!) 144/71 99.2  F (37.3  C) Temporal 78 26 (!) 86 % 1.727 m (5' 8\") 122.5 kg (270 lb)        Physical Exam  General: Well-nourished, appears to be resting comfortably when I enter the room  Eyes: PERRL, conjunctivae pink no scleral icterus or conjunctival injection  ENT:  Moist mucus membranes, posterior oropharynx clear without erythema or exudates  Respiratory:  Lungs scattered course breath sounds. No rubs/wheezes. Good air movement  CV: Normal rate and rhythm, no murmurs/rubs/gallops  GI:  Abdomen soft and non-distended.  Normoactive BS.  No tenderness, " guarding or rebound  Skin: Warm, dry.  No rashes or petechiae  Musculoskeletal: Trace bilateral peripheral edema no calf tenderness  Neuro: Alert and oriented to person/place/time  Psychiatric: Normal affect    Emergency Department Course   ECG 1  ECG taken at 1914, ECG read at 1916  Normal sinus rhythm   Prolonged QT  Abnormal ECG   When compared to prior, dated 10/06/23.  Rate 69 bpm. WA interval 204 ms. QRS duration 92 ms. QT/QTc 450/482 ms. P-R-T axes 58 57 84.     ECG 2  ECG taken at 0856, ECG read at 0858  Atrial fibrillation   Nonspecific T wave abnormality   Abnormal ECG  When compared to prior, dated 08/13/23.  Rate 77 bpm. WA interval * ms. QRS duration 90 ms. QT/QTc 418/473 ms. P-R-T axes * 77 102  .   Imaging:  CT Chest w/o Contrast   Final Result   IMPRESSION:    1.  Findings consistent with an acute, widespread pneumonitis. Of note, drug toxicity, including amiodarone, can have this appearance.      2.  Some of the pulmonary nodules previously identified at CT are obscured by new infiltrates on today's study. Recommend nodule follow-up as described on prior CT report from 09/01/2023.         XR Chest 2 Views   Final Result   IMPRESSION: Patchy multifocal airspace opacity in the right upper lobe and bilateral lower lobes with engorged indistinct central pulmonary vasculature, correlate to exclude multifocal pneumonia and/or pulmonary edema. Recommend follow-up to resolution.    No pneumothorax or pleural effusion. Cardiac silhouette within normal limits.      Echocardiogram Limited with Bubble Study    (Results Pending)          Laboratory:  Labs Ordered and Resulted from Time of ED Arrival to Time of ED Departure   COMPREHENSIVE METABOLIC PANEL - Abnormal       Result Value    Sodium 137      Potassium 4.1      Carbon Dioxide (CO2) 23      Anion Gap 12      Urea Nitrogen 17.5      Creatinine 0.87      GFR Estimate >90      Calcium 8.7 (*)     Chloride 102      Glucose 115 (*)     Alkaline Phosphatase  98      AST 41      ALT 36      Protein Total 7.5      Albumin 3.5      Bilirubin Total 0.4     TROPONIN T, HIGH SENSITIVITY - Abnormal    Troponin T, High Sensitivity 23 (*)    CBC WITH PLATELETS AND DIFFERENTIAL - Abnormal    WBC Count 12.2 (*)     RBC Count 4.25 (*)     Hemoglobin 14.1      Hematocrit 42.2      MCV 99      MCH 33.2 (*)     MCHC 33.4      RDW 13.3      Platelet Count 303      % Neutrophils 75      % Lymphocytes 14      % Monocytes 10      % Eosinophils 1      % Basophils 0      % Immature Granulocytes 0      NRBCs per 100 WBC 0      Absolute Neutrophils 9.0 (*)     Absolute Lymphocytes 1.8      Absolute Monocytes 1.2      Absolute Eosinophils 0.1      Absolute Basophils 0.0      Absolute Immature Granulocytes 0.0      Absolute NRBCs 0.0     ISTAT GASES LACTATE VENOUS POCT - Abnormal    Lactic Acid POCT 0.6      Bicarbonate Venous POCT 28      O2 Sat, Venous POCT 55 (*)     pCO2 Venous POCT 44      pH Venous POCT 7.42      pO2 Venous POCT 29     INR - Abnormal    INR 2.62 (*)    CRP INFLAMMATION - Abnormal    CRP Inflammation 142.41 (*)    TROPONIN T, HIGH SENSITIVITY - Abnormal    Troponin T, High Sensitivity 26 (*)    CRP INFLAMMATION - Abnormal    CRP Inflammation 151.60 (*)    NT PROBNP INPATIENT - Normal    N terminal Pro BNP Inpatient 544     MAGNESIUM - Normal    Magnesium 1.8     INFLUENZA A/B, RSV, & SARS-COV2 PCR - Normal    Influenza A PCR Negative      Influenza B PCR Negative      RSV PCR Negative      SARS CoV2 PCR Negative     ACETAMINOPHEN LEVEL - Normal    Acetaminophen 10.3     PROCALCITONIN - Normal    Procalcitonin 0.14     HIV ANTIGEN ANTIBODY COMBO   BLOOD CULTURE   BLOOD CULTURE        Emergency Department Course & Assessments:     Interventions:  Medications   allopurinol (ZYLOPRIM) tablet 300 mg (has no administration in time range)   amLODIPine (NORVASC) tablet 5 mg (has no administration in time range)   finasteride (PROPECIA) tablet 1 mg (has no administration in time  range)   furosemide (LASIX) tablet 40 mg (has no administration in time range)   losartan (COZAAR) tablet 100 mg (has no administration in time range)   potassium chloride ER (KLOR-CON M) CR tablet 20 mEq (20 mEq Oral $Given 11/26/23 0046)   rivaroxaban ANTICOAGULANT (XARELTO) tablet 20 mg (has no administration in time range)   lidocaine 1 % 0.1-1 mL (has no administration in time range)   lidocaine (LMX4) cream (has no administration in time range)   sodium chloride (PF) 0.9% PF flush 3 mL ( Intracatheter Canceled Entry 11/26/23 0103)   sodium chloride (PF) 0.9% PF flush 3 mL (has no administration in time range)   senna-docusate (SENOKOT-S/PERICOLACE) 8.6-50 MG per tablet 1 tablet (has no administration in time range)     Or   senna-docusate (SENOKOT-S/PERICOLACE) 8.6-50 MG per tablet 2 tablet (has no administration in time range)   calcium carbonate (TUMS) chewable tablet 1,000 mg (has no administration in time range)   Patient is already receiving anticoagulation with heparin, enoxaparin (LOVENOX), warfarin (COUMADIN)  or other anticoagulant medication (has no administration in time range)   melatonin tablet 1 mg (has no administration in time range)   polyethylene glycol (MIRALAX) Packet 17 g (has no administration in time range)   bisacodyl (DULCOLAX) suppository 10 mg (has no administration in time range)   ondansetron (ZOFRAN ODT) ODT tab 4 mg (has no administration in time range)     Or   ondansetron (ZOFRAN) injection 4 mg (has no administration in time range)   prochlorperazine (COMPAZINE) injection 5 mg (has no administration in time range)     Or   prochlorperazine (COMPAZINE) tablet 5 mg (has no administration in time range)     Or   prochlorperazine (COMPAZINE) suppository 12.5 mg (has no administration in time range)   benzonatate (TESSALON) capsule 100 mg (has no administration in time range)   metoprolol (LOPRESSOR) injection 2.5 mg (has no administration in time range)   lidocaine 1 % 0.1-1 mL  (has no administration in time range)   lidocaine (LMX4) cream (has no administration in time range)   sodium chloride (PF) 0.9% PF flush 3 mL (3 mLs Intracatheter $Given 11/26/23 0049)   sodium chloride (PF) 0.9% PF flush 3 mL (has no administration in time range)   predniSONE (DELTASONE) tablet 40 mg (40 mg Oral $Given 11/26/23 0148)   furosemide (LASIX) injection 60 mg (60 mg Intravenous $Given 11/25/23 2104)        Assessments:  I evaluated and assessed patient.  Discussed plan of care and plan for admission.  I updated the patient on the results and the plan for admission and to try a dose of Lasix.    Independent Interpretation (X-rays, CTs, rhythm strip):  I reviewed and interpreted the chest x-ray.  Fluffy infiltrates throughout.  Some vascular congestion.    Consultations/Discussion of Management or Tests:     ED Course as of 11/26/23 0153   Sat Nov 25, 2023 2100 I consulted with Dr. Wheat who gracious agreed to admit the patient.   2133 I consulted with poison control regarding the patient's overuse of Nyquil.  They recommended that the patient likely does not need NAC.  Stated that Tylenol level can be variable but given lack of liver enzyme elevation this is likely nontoxic chronic overuse.  They do recommend rechecking these in 6 hours to be sure that they are going down and out of       Social Determinants of Health affecting care:   None    Disposition:  The patient was admitted to the hospital under the care of Dr. Wheat.     Impression & Plan      Medical Decision Making:  Mike Schultz is a 73 year old male who comes with dyspnea on exertion and is found to be hypoxic.  He improved with oxygen.  He has not required BiPAP.  Etiology is unclear.  Chest x-ray is concerning for multifocal pneumonia versus congestive heart failure.  He does have a history of atrial fibrillation and I was concerned about the possibility of congestive heart failure so we did a dose of treatment with Lasix and strict  I's and O's.  He has been taking his anticoagulant and so I think pulmonary embolism is unlikely.  A valvular issue is a possibility and he will likely need a repeat echo if this has not been done recently.  Dr. Wheat pointed out that amiodarone induced lung issues may also be the etiology.  Patient will be admitted to the hospital on oxygen for further diagnostic evaluation and treatment.  He was in agreement with the plan.    Diagnosis:    ICD-10-CM    1. Acute respiratory failure with hypoxia (H)  J96.01            Discharge Medications:  Current Discharge Medication List             Scribe Disclosure:    Carlo MEDELLIN, am serving as a scribe at 12:02 AM on 11/26/2023 to document services personally performed by Shanthi Spear MD based on my observations and the provider's statements to me.     11/25/2023   Shanthi Spear MD Cho, Amy C, MD  11/26/23 0156

## 2023-11-26 NOTE — PLAN OF CARE
Goal Outcome Evaluation:  ED admission transfer to CCU. A&) x4, VSS on 5L/oxymask. Tele SR. Non productive dry cough. DELEON, denies SOB at rest. Ambulates to bathroom independently. Plans for echo & cardiology consult.

## 2023-11-26 NOTE — ED NOTES
"[facility]  ED Nurse Handoff Report    ED Chief complaint: Shortness of Breath      ED Diagnosis:   Final diagnoses:   Acute respiratory failure with hypoxia (H)       Code Status: To be discussed with MD    Allergies:   Allergies   Allergen Reactions    No Known Drug Allergy        Patient Story: Pt c/o SOB with exertion for the past month. Hx cardioversions for afib. Pt also c/o cough. Takes diuretic. Was satting 80% on RA upon arrival. Does not wear O2 at home.     Focused Assessment:  SOB with exertion, cough. Alert and oriented x4.     Treatments and/or interventions provided: Pt placed on 5.5L O2 via oxymask. Lasix given IV. See eMAR, imaging.    Patient's response to treatments and/or interventions: Currently satting 91-92% on 5.5L. Has been using urinal, see I/O.    To be done/followed up on inpatient unit:  See new orders.    Does this patient have any cognitive concerns?:  none    Activity level - Baseline/Home:  Independent  Activity Level - Current:   Stand with Assist (d/t dyspnea)    Patient's Preferred language: English   Needed?: No    Isolation: None  Infection: Not Applicable  Patient tested for COVID 19 prior to admission: YES (negative)  Bariatric?: No    Vital Signs:   Vitals:    11/25/23 1900 11/25/23 1904 11/25/23 1934 11/25/23 2125   BP: (!) 144/71      Pulse: 78  73 73   Resp: 26  27 25   Temp: 99.2  F (37.3  C)      TempSrc: Temporal      SpO2: (!) 86% (!) 84% 91% 91%   Weight: 122.5 kg (270 lb)      Height: 1.727 m (5' 8\")          Cardiac Rhythm:     Was the PSS-3 completed:   Yes  What interventions are required if any?               Family Comments: No family in room  OBS brochure/video discussed/provided to patient/family: No              Name of person given brochure if not patient: n/a              Relationship to patient: n/a    For the majority of the shift this patient's behavior was Green.   Behavioral interventions performed were n/a.    ED NURSE " PHONE NUMBER: [phone]

## 2023-11-26 NOTE — PROGRESS NOTES
Brief note:    CT returned concerning for pneumonitis.  Procalcitonin low, ESR elevated.  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    Have initiated prednisone 40 mg daily, okay to give first dose now    I have not initiated anti-infectives at this time.    Met with patient, discussed findings and plan of care, reviewed imaging with him at bedside.     Tom Wheat MD    15 minutes in care.

## 2023-11-26 NOTE — CONSULTS
[facility]    Cardiology Consultation     Date of Admission:  11/25/2023    Assessment & Plan   Mike Schultz is a 73 year old male who was admitted on 11/25/2023.    Acute hypoxic respiratory failure   Acute interstitial pneumonitis, could be amiodarone toxicity  Paroxysmal atrial fibrillation with previous ablation and several cardioversions with last 1 in October 2023 when amiodarone was also restarted.  In the past patient has used amiodarone on and off.  No PFTs available in the chart.    Discussion  Patient presented with worsening shortness of breath for last 1 month especially over the last 2 weeks.  He was hypoxic on admission in the emergency room at pulse ox of 80% on room air.  CT chest reveals interstitial pneumonitis likely from amiodarone toxicity.  CRP is elevated.  Agree with discontinuing amiodarone.    I do not believe his shortness of breath is related to heart failure especially given the clinical findings, his chest CT and chest x-ray findings as well as normal N-terminal proBNP.  No need for right heart catheterization at this time.  Pulmonary consult appreciated.  They have started him on steroids.  At today's visit, I reviewed the admission chest x-ray films, CT chest images, admission EKG, recent cardiology notes, pulmonary consult notes.  Labs were reviewed.  Today's decision making was of high complexity.    Patient will be followed by electrophysiology team tomorrow.    Trevin Zuleta MD  Primary Care Physician   Ranjan Courtney    Reason for Consult   Reason for consult: I was asked by hospitalist to evaluate this patient for abnormal CT and possible amiodarone toxicity.    History of Present Illness   Mike Schultz is a 73 year old male who presents with increasing shortness of breath on exertion that was worsening this week.  He also had dry nonproductive cough for last 2 weeks.  There is no chest pain.  No orthopnea PND or edema feet.    Lately also  has been complaining of some palpitations and elevated heart rates and was calling our office.  He has been on amiodarone  for his paroxysmal atrial fibrillation.  He has had several ablation procedures and cardioversion.    His atrial fibrillation was first diagnosed in 2012.  At that time he was on flecainide but it was stopped once he was noted to have circumflex scar on MRI.  He underwent atrial fibrillation ablation in 2017.  He then underwent atrial flutter ablation in 2019.  In 2020 was started on amiodarone for recurrent A-fib, amiodarone was decreased in January 2021.  He went back into persistent A-fib in December 2021.  He was off amiodarone for a while and was restarted in August 2022 and was again stopped in January 2023.  He was restarted on amiodarone recently after a cardioversion on October 2023.  Thus he has been on amnio on and off.     He also has mild to moderate aortic stenosis mean rate of 60 mm on echo in August 2023.  Prior surgical scar on MRI.  His ejection fraction has been low normal.  He has hypertension, sleep apnea and alcohol dependence although no EtOH use since 2017.  He also follows with nodule clinic for his lung nodules.  History of Bell's palsy.    On admission, he had a CT chest without contrast which revealed diffuse reticular infiltrates in both lungs suspicious for pneumonitis.  Amiodarone induced pneumonitis was also thought to be in the differential.  He was hypoxic with pCO2 of 44 and pCO2 of 29 on venous blood gas.  CRP is elevated 151.  BUN/creatinine normal.  Potassium normal.  Troponin is 23 and 26.  TSH is normal.  Hemoglobin normal.  WBC count 11 with normal neutrophils.  INR was 2.6 on admission.  He is negative for COVID-19 and influenza.  EKG on admission was reviewed by me and revealed sinus rhythm without ischemic changes.    Past Medical History   Past Medical History:   Diagnosis Date    Arthritis     gout corrected  taking allopurinol    Basal cell  carcinoma     Cancer (H)     basal nose    H/O ETOH abuse     Herpes simplex without mention of complication     Hyperlipidemia     Hypertension 2000    Obesity     Persistent atrial fibrillation (H)     persistent, DCCV 12/2017, ablation 11/6/17    Sleep apnea     CPAP    Tobacco use disorder     dced 6/2004         Past Surgical History   Past Surgical History:   Procedure Laterality Date    ABLATION OF DYSRHYTHMIC FOCUS  2018, 04/12/2019    Procedure: EP Ablation Focal AFIB;  Surgeon: Fady Day MD;  Location:  [facility]    ACHILLES TENDON SURGERY  1997    ANESTHESIA CARDIOVERSION N/A 2/26/2019    Procedure: ANESTHESIA CARDIOVERSION (CONNER);  Surgeon: GENERIC ANESTHESIA PROVIDER;  Location:  OR    ANESTHESIA CARDIOVERSION N/A 11/19/2020    Procedure: ANESTHESIA, FOR CARDIOVERSION (dr campbell);  Surgeon: GENERIC ANESTHESIA PROVIDER;  Location:  OR    ANESTHESIA CARDIOVERSION N/A 8/22/2022    Procedure: CARDIOVERSION;  Surgeon: GENERIC ANESTHESIA PROVIDER;  Location:  OR    ANESTHESIA CARDIOVERSION N/A 9/13/2023    Procedure: Anesthesia cardioversion;  Surgeon: GENERIC ANESTHESIA PROVIDER;  Location:  OR    ANESTHESIA CARDIOVERSION N/A 10/6/2023    Procedure: Anesthesia cardioversion;  Surgeon: GENERIC ANESTHESIA PROVIDER;  Location:  OR    BASAL CELL CARCINOMA EXCISION Right 2009    nose, took cartilage from inner ear.    CARDIOVERSION  2012, 2019    cardioversion for atr fib    COLONOSCOPY  2008    COLONOSCOPY N/A 3/22/2023    Procedure: COLONOSCOPY, FLEXIBLE, WITH LESION REMOVAL USING SNARE;  Surgeon: Anne Lucero MD;  Location:  GI    EP ABLATION FOCAL AFIB N/A 4/12/2019    Procedure: EP Ablation Focal AFIB;  Surgeon: Fady Day MD;  Location:  [facility]    HERNIA REPAIR, UMBILICAL  08/20/2012    Procedure: HERNIORRHAPHY UMBILICAL;  UMBILICAL HERNIA REPAIR;  Surgeon: Ra Crocker MD;  Location:  [facility]    HERNIORRHAPHY UMBILICAL  8/20/2012     Procedure: HERNIORRHAPHY UMBILICAL;  UMBILICAL HERNIA REPAIR;  Surgeon: Ra Crocker MD;  Location: [facility]    ORTHOPEDIC SURGERY      ORTHOPEDIC SURGERY      OTHER SURGICAL HISTORY Right 03/2017    total KNEE ARTHROSCOPY     TOTAL KNEE ARTHROPLASTY Left 03/16/2018    [facility] [[email or web address] NONSPECIFIC PROCEDURE  1997    achilles tendon    [email or web address] NONSPECIFIC PROCEDURE      knees, arthroscopy    [email or web address] NONSPECIFIC PROCEDURE      basal cell skin ca, nose    [email or web address] NONSPECIFIC PROCEDURE      flex sig; colonoscopy due 3/2008    [facility] [[email or web address] NONSPECIFIC PROCEDURE  2012    cardioversion for atr fib    [facility] [[email or web address] TOTAL KNEE ARTHROPLASTY Left 03/16/2018    Dr. Malick Suarez         Prior to Admission Medications   Prior to Admission Medications   Prescriptions Last Dose Informant Patient Reported? Taking?   allopurinol (ZYLOPRIM) 300 MG tablet   No No   Sig: Take 1 tablet (300 mg) by mouth daily   amLODIPine (NORVASC) 5 MG tablet   No No   Sig: Take 1 tablet (5 mg) by mouth daily   amiodarone (PACERONE) 200 MG tablet   No No   Sig: Take 2 tablets (400 mg) by mouth daily for 7 days, THEN 1 tablet (200 mg) daily.   cetirizine (ZYRTEC) 10 MG tablet   Yes No   Sig: Take 10 mg by mouth as needed for allergies   finasteride (PROPECIA) 1 MG tablet   No No   Sig: Take 1 tablet (1 mg) by mouth daily   furosemide (LASIX) 20 MG tablet   No No   Sig: Take 2 tablets (40 mg) by mouth daily   losartan (COZAAR) 100 MG tablet   No No   Sig: Take 1 tablet (100 mg) by mouth daily   potassium chloride ER (KLOR-CON M) 20 MEQ CR tablet   No No   Sig: Take 1 tablet (20 mEq) by mouth 2 times daily   rivaroxaban ANTICOAGULANT (XARELTO ANTICOAGULANT) 20 MG TABS tablet   No No   Sig: Take 1 tablet (20 mg) by mouth daily (with dinner)      Facility-Administered Medications: None     Current Facility-Administered Medications   Medication Dose Route Frequency    allopurinol  300 mg Oral Daily    amLODIPine  5 mg Oral Daily    finasteride  1 mg Oral Daily    furosemide  40 mg Oral Daily     "losartan  100 mg Oral Daily    potassium chloride ER  20 mEq Oral BID    [START ON 2023] predniSONE  40 mg Oral Daily    rivaroxaban ANTICOAGULANT  20 mg Oral Daily with supper    sodium chloride (PF)  3 mL Intracatheter Q8H    sodium chloride (PF)  3 mL Intracatheter Q8H     Current Facility-Administered Medications   Medication Last Rate    - MEDICATION INSTRUCTIONS -       Allergies   Allergies   Allergen Reactions    No Known Drug Allergy        Social History    reports that he quit smoking about 12 years ago. His smoking use included cigarettes. He started smoking about 46 years ago. He has a 10.00 pack-year smoking history. He has never used smokeless tobacco. He reports that he does not currently use alcohol. He reports that he does not use drugs.      Family History   I have reviewed this patient's family history and updated it with pertinent information if needed.  Family History   Problem Relation Age of Onset    Family History Negative Mother     Heart Disease Father         decesased at 42 - MI    Heart Disease Sister          MI    Lipids Sister     Lipids Sister     No Known Problems Sister     Heart Disease Brother          MI    Heart Disease Brother         CABG AT 49    Lung Cancer No family hx of           Review of Systems   A comprehensive review of system was performed and is negative other than that noted in the HPI or here.     Physical Exam   Vital Signs with Ranges  Temp:  [97.5  F (36.4  C)-99.2  F (37.3  C)] 98.3  F (36.8  C)  Pulse:  [66-78] 70  Resp:  [16-27] 24  BP: (113-144)/(71-85) 113/72  SpO2:  [84 %-95 %] 95 %  Wt Readings from Last 4 Encounters:   23 124.3 kg (274 lb)   10/26/23 126.7 kg (279 lb 4.8 oz)   23 129.3 kg (285 lb)   23 129.3 kg (285 lb)     I/O last 3 completed shifts:  In: 660 [P.O.:660]  Out: 2650 [Urine:2650]      Vitals: /72   Pulse 70   Temp 98.3  F (36.8  C) (Oral)   Resp 24   Ht 1.727 m (5' 8\")   Wt 124.3 kg " "(274 lb)   SpO2 95%   BMI 41.66 kg/m      Physical Exam:   General - Alert and oriented to time place and person in no acute distress  Eyes - No scleral icterus  HEENT - Neck supple, moist mucous membranes  Cardiovascular -regular S1-S2 with 2 x 6 ejection stock murmur in aortic area  Extremities - There is no edema  Respiratory -bilateral fine rales  Skin - No pallor or cyanosis  Gastrointestinal - Non tender and non distended without rebound or guarding  Psych - Appropriate affect   Neurological - No gross motor neurological focal deficits        Recent Labs   Lab 11/26/23  0455 11/26/23  0109 11/25/23  2302 11/25/23 1934 11/24/23  1215   WBC  --   --   --  12.2* 11.0   HGB  --   --   --  14.1 14.8   MCV  --   --   --  99 103*   PLT  --   --   --  303 294   INR 2.10*  --  2.62*  --   --    NA  --   --   --  137 140   POTASSIUM  --   --   --  4.1 4.2   CHLORIDE  --   --   --  102 107   CO2  --   --   --  23 22   BUN  --   --   --  17.5 15.1   CR  --   --   --  0.87 0.89   GFRESTIMATED  --   --   --  >90 >90   ANIONGAP  --   --   --  12 11   ASHELY  --   --   --  8.7* 9.0   GLC  --  160*  --  115* 108*   ALBUMIN 3.6  --   --  3.5 3.5   PROTTOTAL 7.6  --   --  7.5 7.6   BILITOTAL 0.7  --   --  0.4 0.4   ALKPHOS 95  --   --  98 104   ALT 32  --   --  36 28   AST 34  --   --  41 32     Recent Labs   Lab Test 07/27/23  0955 10/06/21  1759   CHOL 163 173   HDL 39* 38*   LDL 97 103*   TRIG 133 161*     Recent Labs   Lab 11/25/23 1934 11/24/23  1215   WBC 12.2* 11.0   HGB 14.1 14.8   HCT 42.2 45.5   MCV 99 103*    294     Recent Labs   Lab 11/25/23 2058   PHV 7.42   PO2V 29   PCO2V 44   HCO3V 28     Recent Labs   Lab 11/25/23 1934 11/24/23 1215   NTBNPI 544  --    NTBNP  --  274     No results for input(s): \"DD\" in the last 168 hours.  No results for input(s): \"SED\", \"CRP\" in the last 168 hours.  Recent Labs   Lab 11/25/23 1934 11/24/23  1215    294     Recent Labs   Lab 11/24/23  1215   TSH 2.67     No " "results for input(s): \"COLOR\", \"APPEARANCE\", \"URINEGLC\", \"URINEBILI\", \"URINEKETONE\", \"SG\", \"UBLD\", \"URINEPH\", \"PROTEIN\", \"UROBILINOGEN\", \"NITRITE\", \"LEUKEST\", \"RBCU\", \"WBCU\" in the last 168 hours.    Imaging:  Recent Results (from the past 48 hour(s))   XR Chest 2 Views    Narrative    EXAM: XR CHEST 2 VIEWS  LOCATION: [facility]  DATE: 11/25/2023    INDICATION: Shortness of breath  COMPARISON: 09/01/2023      Impression    IMPRESSION: Patchy multifocal airspace opacity in the right upper lobe and bilateral lower lobes with engorged indistinct central pulmonary vasculature, correlate to exclude multifocal pneumonia and/or pulmonary edema. Recommend follow-up to resolution.   No pneumothorax or pleural effusion. Cardiac silhouette within normal limits.   CT Chest w/o Contrast    Narrative    EXAM: CT CHEST W/O CONTRAST  LOCATION: [facility]  DATE: 11/25/2023    INDICATION: lung nodules, infiltrates, new hypoxia, amio toxicity?  COMPARISON: Chest radiographs from 11/25/2023, chest CT from 09/01/2023  TECHNIQUE: CT chest without IV contrast. Multiplanar reformats were obtained. Dose reduction techniques were used.  CONTRAST: None.    FINDINGS:   LUNGS AND PLEURA: Patchy bilateral groundglass and reticulated opacities in a random distribution throughout the lungs. Groundglass infiltrates are worst in the right upper lobe. Some of the pulmonary nodules previously seen, particularly at the lung   bases, are obscured  new infiltrates today. No pneumothorax or pleural effusion.    MEDIASTINUM/AXILLAE: Heart size is normal. No pericardial effusion. Aortic valve leaflet and coronary artery calcifications. Several borderline enlarged mediastinal lymph nodes.    CORONARY ARTERY CALCIFICATION: Mild to moderate    UPPER ABDOMEN: No acute findings.    MUSCULOSKELETAL: Severe degenerative disc changes of the thoracic spine.      Impression    IMPRESSION:   1.  Findings consistent " "with an acute, widespread pneumonitis. Of note, drug toxicity, including amiodarone, can have this appearance.    2.  Some of the pulmonary nodules previously identified at CT are obscured by new infiltrates on today's study. Recommend nodule follow-up as described on prior CT report from 09/01/2023.         Echo:  No results found for this or any previous visit (from the past 4320 hour(s)).    Clinically Significant Risk Factors Present on Admission               # Drug Induced Coagulation Defect: home medication list includes an anticoagulant medication    # Hypertension: Noted on problem list      # Severe Obesity: Estimated body mass index is 41.66 kg/m  as calculated from the following:    Height as of this encounter: 1.727 m (5' 8\").    Weight as of this encounter: 124.3 kg (274 lb).             Cardiac Arrhythmia: Atrial fibrillation: Paroxysmal    Other Pulmonary Conditions: Acute interstitial pneumonitis    Pulmonary Heart Disease (Pulmonary hypertension or Cor pulmonale): Pulmonary Hypertension, unspecified                    "

## 2023-11-26 NOTE — H&P
[facility]    History and Physical - Hospitalist Service       Date of Admission:  11/25/2023    Assessment & Plan      Mike Schultz is a 73 year old male admitted on 11/25/2023. He presents to the emergency department for evaluation of increasing dyspnea on exertion over the past month.  Found to have significant hypoxia both at rest and with activity as well as somewhat diffuse pulmonary infiltrates on chest x-ray.    Acute respiratory failure with hypoxia: 1 month of worsening dyspnea on exertion, approximately 2 weeks of dry nonproductive cough, 1 week of fairly profound dyspnea on exertion found to have hypoxia to 81%.  Hypoxia persists at rest and worsens with activity.  Etiology currently unclear.  Concern for acute respiratory distress syndrome, possibly amiodarone toxicity.  Infectious etiologies or heart failure could also be possibilities, but no orthopnea, no fevers.  History of pulmonary nodules  -Noncontrast CT ordered.  Patient is actually due for follow-up CT through nodule clinic.  May help differentiate between heart failure and infiltrates as well as ARDS/amiodarone toxicity.  Estimates that he has been on approximately 9 months of total amiodarone therapy over his life, most recently on 1 month of treatment following last cardioversion.  -Cardiology consulted.  I am discontinuing amiodarone while further evaluating pulmonary infiltrates, and question if he may benefit from right heart cath for his worsening dyspnea and hypoxia pending ongoing workup.  -Add on CRP, procalcitonin pending from ER. ADDENDUM: CRP elevated.  Speaks against heart failure as etiology for hypoxia.  Infectious etiologies as well as amiodarone toxicity would still fit here.  -Add on beta D glucan, HIV antibody antigen screen (low suspicion for HIV/pneumocystis pneumonia w/o fevers, but profound hypoxia with minimal activity is one of the hallmarks of pneumocystis)  -Limited TTE with bubble  study.  Last echocardiogram early September 2023 with moderate aortic stenosis, mild global hypokinesia of LV with EF of 49%.  -P pulmonary consulted; pending evaluation, may benefit from bronchoscopy  -Blood cultures x 2  -Resume prior to admission furosemide 40 mg daily; received an additional dose of 60 mg IV Lasix in the emergency department.  BNP within normal limits and no orthopnea suggests against acute heart failure exacerbation.  -Has been on Xarelto anticoagulation consistently for the past 5 years or so by his estimation.  Low suspicion for pulmonary embolism.  -Will monitor on [facility] [place] given concern for ARDS picture.    Paroxysmal atrial fibrillation: History of ablation in the past, but subsequent recurrence and several cardioversions, most recently 9/13/2023, 10/6/2023.  -Continue prior to admission Xarelto anticoagulation  -Cardiology consulted as above  -Limited TTE as above  -Continue cardiac telemetry  -Discontinuing amiodarone as above  -As needed IV metoprolol available as needed for rate control.  He reports that his typical resting heart rate is in the 50s to 60s range, so only low doses have been ordered thus far and not yet starting on.  -Continue Zio patch    Non-ST elevation myocardial infarction: Suspect type II with demand ischemia from systemic hypoxia.  No chest pain or pressure, no palpitations.  -Repeat troponin.  Suspect mild elevation resultant from systemic hypoxia    Class III obesity: BMI of 41.05 by ER weight.  Increased risk of all cause mortality  -With treatment of hypoxic respiratory failure, encourage patient to resume lifestyle modification including diet and exercise, alcohol cessation.    Obstructive sleep apnea:  -CPAP as per home regimen.  He believes his pressure is somewhere around +5.    Gout:  -Continue prior to admission allopurinol    Hypertension:  -Continue prior to admission Cozaar 100 mg daily, amlodipine 5 mg daily.    Pulmonary nodules: Noted  "during recent evaluation with aortic aneurysm screening.  Is following with lung nodule clinic and due for noncontrast CT of the chest  -Noncontrast CT as above to evaluate acute hypoxic respiratory failure    Overuse of acetaminophen: Reports he has been drinking approximately 1 bottle of DayQuil per day for a dry cough.  LFTs within normal limits, acetaminophen level low, but more chronic use.  -INR and hepatic panel orders in place to follow per poison control recommendations.  Note that INR will be somewhat elevated as patient is chronically anticoagulated on Xarelto.            Diet:   2 g sodium cardiac diet  DVT Prophylaxis: DOAC  Palmer Catheter: Not present  Lines: None     Cardiac Monitoring: None  Code Status:  Full code.  Confirmed with patient on admission    Clinically Significant Risk Factors Present on Admission               # Drug Induced Coagulation Defect: home medication list includes an anticoagulant medication    # Hypertension: Noted on problem list      # Severe Obesity: Estimated body mass index is 41.05 kg/m  as calculated from the following:    Height as of this encounter: 1.727 m (5' 8\").    Weight as of this encounter: 122.5 kg (270 lb).              Disposition Plan      Expected Discharge Date: 11/29/2023                  Tom Wehat MD  Hospitalist Service  [facility]  Securely message with [employer] (more info)  Text page via [facility] Paging/Directory     ______________________________________________________________________    Chief Complaint   Dyspnea on exertion    History is obtained from the patient, chart review, discussion with Dr. Spear in the emergency department.    History of Present Illness   Mike Schultz is a 73 year old male who presents to the emergency department for evaluation of significant and progressive dyspnea on exertion which has been present for the past month or so, but significant worsening over this past week.  Has also had a " cough which is dry and nonproductive over the past 2 weeks or so.  No fevers, no rigors.    Patient has a history of morbid obesity, obstructive sleep apnea, moderate aortic stenosis, paroxysmal atrial fibrillation, hypertension.  He has had heart failure in the past associated with his atrial fibrillation, and does have some mild global hypokinesia of his LV as of September echocardiogram, LVEF of approximately 50% at that time.    Patient reports that he has not had any lower extremity swelling, he has no orthopnea.  He actually tells me specifically that he feels well when he lays down, and feels that he wants to lay down all day because he is so comfortable.  He does not necessarily feel short of breath when he is standing or sitting, but if he moves at all, he tells me that he needs to rest for a prolonged period of time before he is able to catch his breath again.  As an example, he tells me that he will walk up 14 steps from his basement and feel the need to rest for nearly 20 minutes.  The symptoms, again, have been present for the past month but substantially worsened over the past 1 week.    Patient has a history of some pulmonary nodules which were incidentally noted during an evaluation for aortic aneurysm screening and subsequent CTA.  He has been seen in pulmonary nodule clinic, most recently September 14.  He is due for repeat noncontrast CT 2 months from that appointment, so is essentially due for repeat imaging.  No significant infiltrates on CT at that time, nodules primarily left lower lobe.    In September and October he tells me that he was drinking some alcohol, a few cans of Mountain Dew seltzer per day, and went back into atrial fibrillation.  He reports that he has had an issue with recurrent episodes of atrial fibrillation when he drinks alcohol.  Recurrent cardioversion in September and again in October.  Following October cardioversion was placed on amiodarone 200 mg twice daily for 1  week followed by 200 mg daily.  He is still on 200 mg daily amiodarone.  Reports that several years ago he was on amiodarone for an estimated 8 months.  Dyspnea on exertion has occurred since cardioversion.    Has had follow-up with cardiology and they have sent labs with normal BNP suggesting against heart failure.  Briefly was on a double dose of his home furosemide, now back to daily dosing.  Has some chronic lymphedema, but not worsened or changed from baseline, and again, no orthopnea.    Was going to go to dinner tonight with family and decided that he felt too unwell with his shortness of breath with activity.  Came instead to the emergency department where he was found to have hypoxia down to the 80% range even at rest.    Other than a dry nonproductive cough over the past week, patient does not strongly endorse any infectious symptoms.  He does not feel unwell as though he is infected, and feels well when he is at rest or laying down.  He is requiring nearly 6 L of facemask oxygen to maintain an oxygen saturation of 90%.  He has had no chest pressure or palpitations with his worsening shortness of breath.  He actually has a Zio patch in place to evaluate for any potential dysrhythmias.  Only recently placed through cardiology clinic.    Patient is very detail oriented.  Multiple questions regarding laboratory studies which she received recently through cardiology clinic as well as here in the emergency department.  He is receiving his MyChart results in real-time and has questions regarding his venous blood gas analysis, mild leukocytosis, mildly low RBC count.  Has concerns regarding recent evaluation for possible lung cancer through pulmonary nodule clinic.  Discussed anticipated duration of hospitalization 3 or more days given his fairly significant hypoxia and need for further workup of etiology; at time of my visit with patient it is not entirely clear if his symptoms are infectious, cardiac, or  secondary to infiltrative pulmonary disease.        Past Medical History    Past Medical History:   Diagnosis Date    Arthritis     gout corrected  taking allopurinol    Basal cell carcinoma     Cancer (H)     basal nose    H/O ETOH abuse     Herpes simplex without mention of complication     Hyperlipidemia     Hypertension 2000    Obesity     Persistent atrial fibrillation (H)     persistent, DCCV 12/2017, ablation 11/6/17    Sleep apnea     CPAP    Tobacco use disorder     dced 6/2004       Past Surgical History   Past Surgical History:   Procedure Laterality Date    ABLATION OF DYSRHYTHMIC FOCUS  2018, 04/12/2019    Procedure: EP Ablation Focal AFIB;  Surgeon: Fady Day MD;  Location:  HEART CARDIAC CATH LAB    ACHILLES TENDON SURGERY  1997    ANESTHESIA CARDIOVERSION N/A 2/26/2019    Procedure: ANESTHESIA CARDIOVERSION (CONNER);  Surgeon: GENERIC ANESTHESIA PROVIDER;  Location:  OR    ANESTHESIA CARDIOVERSION N/A 11/19/2020    Procedure: ANESTHESIA, FOR CARDIOVERSION (dr campbell);  Surgeon: GENERIC ANESTHESIA PROVIDER;  Location:  OR    ANESTHESIA CARDIOVERSION N/A 8/22/2022    Procedure: CARDIOVERSION;  Surgeon: GENERIC ANESTHESIA PROVIDER;  Location:  OR    ANESTHESIA CARDIOVERSION N/A 9/13/2023    Procedure: Anesthesia cardioversion;  Surgeon: GENERIC ANESTHESIA PROVIDER;  Location:  OR    ANESTHESIA CARDIOVERSION N/A 10/6/2023    Procedure: Anesthesia cardioversion;  Surgeon: GENERIC ANESTHESIA PROVIDER;  Location:  OR    BASAL CELL CARCINOMA EXCISION Right 2009    nose, took cartilage from inner ear.    CARDIOVERSION  2012, 2019    cardioversion for atr fib    COLONOSCOPY  2008    COLONOSCOPY N/A 3/22/2023    Procedure: COLONOSCOPY, FLEXIBLE, WITH LESION REMOVAL USING SNARE;  Surgeon: Anne Lucero MD;  Location:  GI    EP ABLATION FOCAL AFIB N/A 4/12/2019    Procedure: EP Ablation Focal AFIB;  Surgeon: Fady Day MD;  Location:  HEART CARDIAC CATH LAB    HERNIA REPAIR,  UMBILICAL  08/20/2012    Procedure: HERNIORRHAPHY UMBILICAL;  UMBILICAL HERNIA REPAIR;  Surgeon: Ra Crocker MD;  Location: [facility]    HERNIORRHAPHY UMBILICAL  8/20/2012    Procedure: HERNIORRHAPHY UMBILICAL;  UMBILICAL HERNIA REPAIR;  Surgeon: Ra Crocker MD;  Location: [facility]    ORTHOPEDIC SURGERY      ORTHOPEDIC SURGERY      OTHER SURGICAL HISTORY Right 03/2017    total KNEE ARTHROSCOPY     TOTAL KNEE ARTHROPLASTY Left 03/16/2018    [facility] NONSPECIFIC PROCEDURE  1997    achilles tendon    [facility] NONSPECIFIC PROCEDURE      knees, arthroscopy    [facility] NONSPECIFIC PROCEDURE      basal cell skin ca, nose    [facility] NONSPECIFIC PROCEDURE      flex sig; colonoscopy due 3/2008    [facility] NONSPECIFIC PROCEDURE  2012    cardioversion for atr fib    [facility] TOTAL KNEE ARTHROPLASTY Left 03/16/2018    Dr. Malick Suarez       Prior to Admission Medications   Prior to Admission Medications   Prescriptions Last Dose Informant Patient Reported? Taking?   allopurinol (ZYLOPRIM) 300 MG tablet   No No   Sig: Take 1 tablet (300 mg) by mouth daily   amLODIPine (NORVASC) 5 MG tablet   No No   Sig: Take 1 tablet (5 mg) by mouth daily   amiodarone (PACERONE) 200 MG tablet   No No   Sig: Take 2 tablets (400 mg) by mouth daily for 7 days, THEN 1 tablet (200 mg) daily.   cetirizine (ZYRTEC) 10 MG tablet   Yes No   Sig: Take 10 mg by mouth as needed for allergies   finasteride (PROPECIA) 1 MG tablet   No No   Sig: Take 1 tablet (1 mg) by mouth daily   furosemide (LASIX) 20 MG tablet   No No   Sig: Take 2 tablets (40 mg) by mouth daily   losartan (COZAAR) 100 MG tablet   No No   Sig: Take 1 tablet (100 mg) by mouth daily   potassium chloride ER (KLOR-CON M) 20 MEQ CR tablet   No No   Sig: Take 1 tablet (20 mEq) by mouth 2 times daily   rivaroxaban ANTICOAGULANT (XARELTO ANTICOAGULANT) 20 MG TABS tablet   No No   Sig: Take 1 tablet (20 mg) by mouth daily (with dinner)      Facility-Administered Medications: None           Physical Exam   Vital Signs:  Temp: 99.2  F (37.3  C) Temp src: Temporal BP: (!) 144/71 Pulse: 73   Resp: 27 SpO2: 91 % O2 Device: None (Room air)    Weight: 270 lbs 0 oz    General Appearance: Generally comfortable appearing 73-year-old male sitting at edge of bed, occasionally standing to urinate in bedside urinal following Lasix dosing.  Does not appear toxic or acutely unwell.  He actually does not appear uncomfortable despite mild tachypnea and facemask oxygen maintaining oxygen saturations of approximately 90%.  Eyes: No scleral icterus or injection.  HEENT: Normocephalic and atraumatic no telangiectasias noted of tongue.  Not cyanotic.  Respiratory: No wheezes, excellent effort.  Good air movement throughout lung fields.  Patient with some left basilar crackles as well as some right upper lung field fine inspiratory crackles consistent with chest x-ray findings.  No rhonchi.  Very infrequent dry cough which is not rattling or productive in nature.  Has some tachypnea on facemask oxygen, but speaking in full sentences  Cardiovascular: Patient with mid to late peaking somewhat harsh systolic murmur best appreciated at right upper sternal border.  I see that he has a known history of moderate aortic stenosis.  GI: Abdomen obese, soft, nontender to palpation.  No palpable mass.    Lymph/Hematologic: Patient with some chronic lymphedema bilateral lower extremities  Genitourinary: Frequent moderate volume urination following Lasix administration in the emergency department.  No gross hematuria.  Skin: Some venous stasis hyperpigmentation of bilateral lower extremities.  No jaundice  Musculoskeletal: Muscular tone and bulk intact in all extremities and appropriate for age.  Neurologic: Alert, conversant, appropriate conversation.  Mental status is grossly intact.  Psychiatric: Very pleasant, normal affect.  Not necessarily anxious, but very interested in and involved with results as they occur, and is interested in plan for workup and current  diagnostic findings may result in patient being somewhat over informed, if you will, regarding potential diagnostic pathways, which would potentially confuse him as tests are ordered and received (for instance, mention that pending findings, a RH cath might be pursued, but similarly, might not pending findings).  I did discuss this concern with him in the emergency department. Shortly thereafter patient with confusion and questions regarding HIV screen, though we did discuss this in the context of PJP prior to being ordered.    Medical Decision Making       80 MINUTES SPENT BY ME on the date of service doing chart review, history, exam, documentation & further activities per the note.      Data     I have personally reviewed the following data over the past 24 hrs:    12.2 (H)  \   14.1   / 303     137 102 17.5 /  115 (H)   4.1 23 0.87 \     ALT: 36 AST: 41 AP: 98 TBILI: 0.4   ALB: 3.5 TOT PROTEIN: 7.5 LIPASE: N/A     Trop: 23 (H) BNP: 544     Procal: N/A CRP: 142.41 (H) Lactic Acid: 0.6         Imaging results reviewed over the past 24 hrs:   Recent Results (from the past 24 hour(s))   XR Chest 2 Views    Narrative    EXAM: XR CHEST 2 VIEWS  LOCATION: [facility]  DATE: 11/25/2023    INDICATION: Shortness of breath  COMPARISON: 09/01/2023      Impression    IMPRESSION: Patchy multifocal airspace opacity in the right upper lobe and bilateral lower lobes with engorged indistinct central pulmonary vasculature, correlate to exclude multifocal pneumonia and/or pulmonary edema. Recommend follow-up to resolution.   No pneumothorax or pleural effusion. Cardiac silhouette within normal limits.

## 2023-11-26 NOTE — PLAN OF CARE
Goal Outcome Evaluation:  Neuro- A&OX4  Most Recent Vitals- Temp: 98.3  F (36.8  C) Temp src: Oral BP: (!) 129/98 Pulse: 73   Resp: 19 SpO2: 91 % O2 Device: Nasal cannula   Tele/Cardiac- SR  Resp- 4L NSC  Activity- SBA/IND  Pain- Denies   Drips- none   Drains/Tubes- P-IVX1 left AC  Skin- Intact   GI/- WDL, ambulates to the bathroom   Aggression Color- Green  COVID status- Negative  Plan- Po prednisone, wean O2 as able.   Misc-     Osito Ferrell RN

## 2023-11-26 NOTE — PROGRESS NOTES
[facility] Poison control (Deirdre, Pharmacist ) called to report that they are signing off due pt's acetaminophen and hepatic panel level is within normal limits.

## 2023-11-26 NOTE — ED NOTES
Pt transferred upstairs on monitor accompanied by RN, on 6L O2 via oxymask. Vitals stable. Belongings with pt.

## 2023-11-26 NOTE — CONSULTS
"Pulmonary Consult Note    Date of Service: 11/26/23    Assessment and Recommendations:  73M HTN, HLD, Afib (s/p ablation), former smoker admitted 11/25 w/ worsening dyspnea on exertion. 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 Prior CT in 9/2023 w/o these findings. Elevated CRP supports this. Although one month is quicker than typical for pulmonary toxicity from amiodarone, it is possible and he has had prior exposure.     - agree with prednisone 40mg daily, may increase to 60mg if worsening dyspnea  - would hold off on bronchoscopy, at this time, and monitor response to prednisone  - will require prolonged prednisone taper which may be planned pending improvement  - should follow up in pulmonary clinic with repeat CT chest and PFTs    Pulmonary will continue to follow.     Chief Complaint   Patient presents with    Shortness of Breath       Summary:  73M HTN, HLD, Afib (s/p ablation), former smoker admitted 11/25 w/ worsening dyspnea on exertion. Hypoxemic to 81%. Pt has been on amiodarone and there is concern for amiodarone toxicity. Amiodarone discontinued. Today, pt started on prednisone 40mg. Currently on 5L oxymask. Pt reports restarting amiodarone 1mo ago, has previously been on for several months. He has had increasing dyspnea on exertion w/ minimal activity. No SOB at rest. No chest pain or tightness. No palpitations. No wheezing. Dry cough. Increasing fatigue. No F/C, night sweats, unexpected wt loss.     10 point review of systems negative, aside from that mentioned above    /72   Pulse 70   Temp 98.3  F (36.8  C) (Oral)   Resp 24   Ht 1.727 m (5' 8\")   Wt 124.3 kg (274 lb)   SpO2 95%   BMI 41.66 kg/m    Gen: NAD  HEENT: anicteric, OP clear, Mallampati IV  Card: RRR  Pulm: clear bilaterally   Abd: soft, NTND  MSK: no LE edema, no acute joint abnormalities  Skin: no obvious rash  Psych: normal affect  Neuro: answering questions appropriately     Labs: personally " reviewed    Imaging: personally reviewed    Past Medical History:   Diagnosis Date    Arthritis     gout corrected  taking allopurinol    Basal cell carcinoma     Cancer (H)     basal nose    H/O ETOH abuse     Herpes simplex without mention of complication     Hyperlipidemia     Hypertension 2000    Obesity     Persistent atrial fibrillation (H)     persistent, DCCV 12/2017, ablation 11/6/17    Sleep apnea     CPAP    Tobacco use disorder     dced 6/2004       Past Surgical History:   Procedure Laterality Date    ABLATION OF DYSRHYTHMIC FOCUS  2018, 04/12/2019    Procedure: EP Ablation Focal AFIB;  Surgeon: Fady Day MD;  Location:  [facility]    ACHILLES TENDON SURGERY  1997    ANESTHESIA CARDIOVERSION N/A 2/26/2019    Procedure: ANESTHESIA CARDIOVERSION (CONNER);  Surgeon: GENERIC ANESTHESIA PROVIDER;  Location:  OR    ANESTHESIA CARDIOVERSION N/A 11/19/2020    Procedure: ANESTHESIA, FOR CARDIOVERSION (dr campbell);  Surgeon: GENERIC ANESTHESIA PROVIDER;  Location:  OR    ANESTHESIA CARDIOVERSION N/A 8/22/2022    Procedure: CARDIOVERSION;  Surgeon: GENERIC ANESTHESIA PROVIDER;  Location:  OR    ANESTHESIA CARDIOVERSION N/A 9/13/2023    Procedure: Anesthesia cardioversion;  Surgeon: GENERIC ANESTHESIA PROVIDER;  Location:  OR    ANESTHESIA CARDIOVERSION N/A 10/6/2023    Procedure: Anesthesia cardioversion;  Surgeon: GENERIC ANESTHESIA PROVIDER;  Location:  OR    BASAL CELL CARCINOMA EXCISION Right 2009    nose, took cartilage from inner ear.    CARDIOVERSION  2012, 2019    cardioversion for atr fib    COLONOSCOPY  2008    COLONOSCOPY N/A 3/22/2023    Procedure: COLONOSCOPY, FLEXIBLE, WITH LESION REMOVAL USING SNARE;  Surgeon: Anne Lucero MD;  Location:  GI    EP ABLATION FOCAL AFIB N/A 4/12/2019    Procedure: EP Ablation Focal AFIB;  Surgeon: Fady Day MD;  Location:  [facility]    HERNIA REPAIR, UMBILICAL  08/20/2012    Procedure: HERNIORRHAPHY UMBILICAL;   UMBILICAL HERNIA REPAIR;  Surgeon: Ra Crocker MD;  Location: [facility]    HERNIORRHAPHY UMBILICAL  2012    Procedure: HERNIORRHAPHY UMBILICAL;  UMBILICAL HERNIA REPAIR;  Surgeon: Ra Crocker MD;  Location: [facility]    ORTHOPEDIC SURGERY      ORTHOPEDIC SURGERY      OTHER SURGICAL HISTORY Right 2017    total KNEE ARTHROSCOPY     TOTAL KNEE ARTHROPLASTY Left 2018    ZZC NONSPECIFIC PROCEDURE      achilles tendon    Z NONSPECIFIC PROCEDURE      knees, arthroscopy    ZZC NONSPECIFIC PROCEDURE      basal cell skin ca, nose    ZZ NONSPECIFIC PROCEDURE      flex sig; colonoscopy due 3/2008    ZZC NONSPECIFIC PROCEDURE      cardioversion for atr fib    ZZC TOTAL KNEE ARTHROPLASTY Left 2018    Dr. Malick Suarez       Family History   Problem Relation Age of Onset    Family History Negative Mother     Heart Disease Father         decesased at 42 - MI    Heart Disease Sister          MI    Lipids Sister     Lipids Sister     No Known Problems Sister     Heart Disease Brother          MI    Heart Disease Brother         CABG AT 49    Lung Cancer No family hx of        Social History     Socioeconomic History    Marital status: Single     Spouse name: Not on file    Number of children: Not on file    Years of education: Not on file    Highest education level: Not on file   Occupational History    Not on file   Tobacco Use    Smoking status: Former     Packs/day: 1.00     Years: 10.00     Additional pack years: 0.00     Total pack years: 10.00     Types: Cigarettes     Start date:      Quit date: 2011     Years since quittin.2    Smokeless tobacco: Never    Tobacco comments:     quit    Vaping Use    Vaping Use: Never used   Substance and Sexual Activity    Alcohol use: Not Currently    Drug use: Never    Sexual activity: Not Currently     Partners: Female     Birth control/protection: None   Other Topics Concern    Parent/sibling w/ CABG, MI or angioplasty  before 65F 55M? 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

## 2023-11-26 NOTE — PHARMACY-ADMISSION MEDICATION HISTORY
Pharmacy Intern Admission Medication History    Admission medication history is complete. The information provided in this note is only as accurate as the sources available at the time of the update.    Information Source(s): Patient and CareEverywhere/SureScripts via in-person    Pertinent Information:   -- Pt reports he's been taking amiodarone 100mg QD for past month, was told to discontinue yesterday while inpatient. Filled 200mg on 11/16 for 44/30ds  -- Pt takes furosemide 2 x 20mg QD despite Rx written for 1 x 20mg QD per fill hx  -- Takes xarelto 20mg, never had eliquis    Changes made to PTA medication list:  Added: None  Deleted: hydrochlorothiazide (not previously on list)  Changed: None    Medication Affordability:  Not including over the counter (OTC) medications, was there a time in the past 3 months when you did not take your medications as prescribed because of cost?: No    Allergies reviewed with patient and updates made in EHR:  Reviewed by RN    Medication History Completed By: CED ROLLINS 11/26/2023 11:50 AM    Prior to Admission medications    Medication Sig Last Dose Taking? Auth Provider Long Term End Date   allopurinol (ZYLOPRIM) 300 MG tablet Take 1 tablet (300 mg) by mouth daily 11/25/2023 at am Yes Ranjan Courtney MD     amLODIPine (NORVASC) 5 MG tablet Take 1 tablet (5 mg) by mouth daily 11/25/2023 at pm Yes Natalie Campos PA-C Yes    cetirizine (ZYRTEC) 10 MG tablet Take 10 mg by mouth as needed for allergies Unknown at PRN Yes Reported, Patient     finasteride (PROPECIA) 1 MG tablet Take 1 tablet (1 mg) by mouth daily 11/25/2023 Yes Ranjan Courtney MD     furosemide (LASIX) 20 MG tablet Take 2 tablets (40 mg) by mouth daily 11/25/2023 Yes Natalie Campos PA-C Yes    losartan (COZAAR) 100 MG tablet Take 1 tablet (100 mg) by mouth daily 11/25/2023 at am Yes Ranjan Courtney MD Yes    potassium chloride ER (KLOR-CON M) 20 MEQ CR tablet Take 1  tablet (20 mEq) by mouth 2 times daily 11/25/2023 at pm Yes Natalie Campos PA-C     rivaroxaban ANTICOAGULANT (XARELTO ANTICOAGULANT) 20 MG TABS tablet Take 1 tablet (20 mg) by mouth daily (with dinner) 11/25/2023 at pm Yes Natalie Campos PA-C Yes    amiodarone (PACERONE) 200 MG tablet Take 2 tablets (400 mg) by mouth daily for 7 days, THEN 1 tablet (200 mg) daily.  Patient not taking: Reported on 11/26/2023 Not Taking  Natalie Campos PA-C Yes 9/19/24

## 2023-11-26 NOTE — PROGRESS NOTES
[facility]    Medicine Progress Note - Hospitalist Service    Date of Admission:  11/25/2023    Assessment & Plan   Mike Schultz is a 73 year old male admitted on 11/25/2023. He presents to the emergency department for evaluation of increasing dyspnea on exertion over the past month.  Found to have significant hypoxia both at rest and with activity as well as somewhat diffuse pulmonary infiltrates on chest x-ray.     Acute respiratory failure with hypoxia:   -1 month of worsening dyspnea on exertion with significant worsening for the last week, approximately 2 weeks of dry nonproductive cough  -found to have hypoxia to 81%  -Etiology currently unclear.  Concern for amiodarone toxicity.  Estimates that he has been on approximately 9 months of total amiodarone therapy over his life, most recently on 1 month of treatment following last cardioversion. Infectious etiologies or heart failure could also be possibilities, but no orthopnea, no fevers.  proBNP within normal limits  -Noncontrast CT -finding consistent with acute widespread pneumonitis, of note drug toxicity including amiodarone can have this appearance.   -PTA amiodarone has been discontinued  -Started on prednisone 40 mg daily  -Cardiology consulted.   -Elevated , procalcitonin 0.14  - beta D glucan, HIV antibody antigen screen pending  -Limited TTE with bubble study.  Last echocardiogram early September 2023 with moderate aortic stenosis, mild global hypokinesia of LV with EF of 49%.  -P pulmonary consulted; pending evaluation, may benefit from bronchoscopy  -Blood cultures x 2  -Continue PTA furosemide 40 mg daily; received 60 mg IV Lasix in the ED.   -Has been on Xarelto anticoagulation consistently for the past 5 years or so by his estimation.  Low suspicion for pulmonary embolism.  -Currently on 5 L oxygen by OxyMask  -Continue to monitor [facility] status      Paroxysmal atrial fibrillation: History of ablation in the  past, but subsequent recurrence and several cardioversions, most recently 9/13/2023, 10/6/2023.  -Continue prior to admission Xarelto anticoagulation  -Cardiology consulted as above  -Limited TTE as above  -Continue cardiac telemetry  -Discontinuing amiodarone as above  -As needed IV metoprolol available as needed for rate control.  He reports that his typical resting heart rate is in the 50s to 60s range, so only low doses have been ordered thus far and not yet starting on.  -Continue to monitor in telemetry, Zio patch to be continued at discharge     Non-ST elevation myocardial infarction: Suspect type II with demand ischemia from systemic hypoxia.  No chest pain or pressure, no palpitations.  -Repeat troponin has remained flat.  Suspect mild elevation resultant from systemic hypoxia  -Getting limited echo as above, cardiology following     Class III obesity: BMI of 41.05 by ER weight.  Increased risk of all cause mortality  -With treatment of hypoxic respiratory failure, encourage patient to resume lifestyle modification including diet and exercise, alcohol cessation.     Leukocytosis, mild  -Likely stress demargination, monitor for any evidence of fever or other systemic signs of infection.    Obstructive sleep apnea:  -CPAP as per home regimen.  He believes his pressure is somewhere around +5.     Gout:  -Continue prior to admission allopurinol     Hypertension:  -Continue prior to admission Cozaar 100 mg daily, amlodipine 5 mg daily.     Pulmonary nodules: Noted during recent evaluation with aortic aneurysm screening.  Is following with lung nodule clinic and due for noncontrast CT of the chest  -Noncontrast CT -shows that some of the pulmonary nodules previously identified at CT are obscured by new infiltrate on today's study.  Recommend nodule follow-up as described on prior CT report from 9/1/2023     Overuse of acetaminophen: Reports he has been drinking approximately 1 bottle of DayQuil per day for a dry  "cough.  LFTs within normal limits, acetaminophen level low, but more chronic use.  -INR and hepatic panel orders in place as per poison control recommendations.  Note that INR will be somewhat elevated as patient is chronically anticoagulated on Xarelto.  Hepatic panel normal, Poison control has now signed off     Diet: Combination Diet 2 gm NA Diet; Low Saturated Fat Na <2400mg Diet, No Caffeine Diet    DVT Prophylaxis: DOAC  Palmer Catheter: Not present  Lines: None     Cardiac Monitoring: ACTIVE order. Indication: developing ARDS vs amio toxicity, parox afib  Code Status: Full Code      Clinically Significant Risk Factors Present on Admission               # Drug Induced Coagulation Defect: home medication list includes an anticoagulant medication    # Hypertension: Noted on problem list      # Severe Obesity: Estimated body mass index is 41.66 kg/m  as calculated from the following:    Height as of this encounter: 1.727 m (5' 8\").    Weight as of this encounter: 124.3 kg (274 lb).              Disposition Plan      Expected Discharge Date: 11/27/2023                    Rupa James MD  Hospitalist Service  [facility]  Securely message with CallGrader (more info)  Text page via Connotate Paging/Directory   ______________________________________________________________________    Interval History   Care assumed, chart reviewed.  Patient reports no change overnight as far as his breathing is concerned.  Still needing 5 L oxygen.  No other nursing concerns.    Physical Exam   Vital Signs: Temp: 97.5  F (36.4  C) Temp src: Axillary BP: 131/72 Pulse: 70   Resp: 24 SpO2: 95 % O2 Device: Oxymask Oxygen Delivery: 5 LPM  Weight: 274 lbs 0 oz    Exam:  Constitutional: Awake, alert and no distress. Appears comfortable  Head: Normocephalic. No masses, lesions, tenderness or abnormalities  ENMT: ENT exam normal, no neck nodes or sinus tenderness  Cardiovascular: RRR.  2+ murmurs, no rubs or " JVD  Respiratory:normal WOB,b/l equal air entry, no wheezes or crackles   Gastrointestinal: Abdomen soft, non-tender. BS normal. No masses, organomegaly  : Deferred  Extremities :no edema , no clubbing or cyanosis    Neurologic: Cranial nerves II-XII grossly intact , power symmetrical, Reflexes normal and symmetric. Sensation grossly WNL.     Medical Decision Making       52 MINUTES SPENT BY ME on the date of service doing chart review, history, exam, documentation & further activities per the note.      Data     I have personally reviewed the following data over the past 24 hrs:    12.2 (H)  \   14.1   / 303     137 102 17.5 /  160 (H)   4.1 23 0.87 \     ALT: 32 AST: 34 AP: 95 TBILI: 0.7   ALB: 3.6 TOT PROTEIN: 7.6 LIPASE: N/A     Trop: 26 (H) BNP: 544     Procal: 0.14 CRP: 151.60 (H) Lactic Acid: 0.6       INR:  2.10 (H) PTT:  N/A   D-dimer:  N/A Fibrinogen:  N/A       Imaging results reviewed over the past 24 hrs:   Recent Results (from the past 24 hour(s))   XR Chest 2 Views    Narrative    EXAM: XR CHEST 2 VIEWS  LOCATION: [facility]  DATE: 11/25/2023    INDICATION: Shortness of breath  COMPARISON: 09/01/2023      Impression    IMPRESSION: Patchy multifocal airspace opacity in the right upper lobe and bilateral lower lobes with engorged indistinct central pulmonary vasculature, correlate to exclude multifocal pneumonia and/or pulmonary edema. Recommend follow-up to resolution.   No pneumothorax or pleural effusion. Cardiac silhouette within normal limits.   CT Chest w/o Contrast    Narrative    EXAM: CT CHEST W/O CONTRAST  LOCATION: [facility]  DATE: 11/25/2023    INDICATION: lung nodules, infiltrates, new hypoxia, amio toxicity?  COMPARISON: Chest radiographs from 11/25/2023, chest CT from 09/01/2023  TECHNIQUE: CT chest without IV contrast. Multiplanar reformats were obtained. Dose reduction techniques were used.  CONTRAST: None.    FINDINGS:   LUNGS AND  PLEURA: Patchy bilateral groundglass and reticulated opacities in a random distribution throughout the lungs. Groundglass infiltrates are worst in the right upper lobe. Some of the pulmonary nodules previously seen, particularly at the lung   bases, are obscured  new infiltrates today. No pneumothorax or pleural effusion.    MEDIASTINUM/AXILLAE: Heart size is normal. No pericardial effusion. Aortic valve leaflet and coronary artery calcifications. Several borderline enlarged mediastinal lymph nodes.    CORONARY ARTERY CALCIFICATION: Mild to moderate    UPPER ABDOMEN: No acute findings.    MUSCULOSKELETAL: Severe degenerative disc changes of the thoracic spine.      Impression    IMPRESSION:   1.  Findings consistent with an acute, widespread pneumonitis. Of note, drug toxicity, including amiodarone, can have this appearance.    2.  Some of the pulmonary nodules previously identified at CT are obscured by new infiltrates on today's study. Recommend nodule follow-up as described on prior CT report from 09/01/2023.       Recent Labs   Lab 11/26/23  0455 11/26/23  0109 11/25/23  2302 11/25/23  1934 11/24/23  1215   WBC  --   --   --  12.2* 11.0   HGB  --   --   --  14.1 14.8   MCV  --   --   --  99 103*   PLT  --   --   --  303 294   INR 2.10*  --  2.62*  --   --    NA  --   --   --  137 140   POTASSIUM  --   --   --  4.1 4.2   CHLORIDE  --   --   --  102 107   CO2  --   --   --  23 22   BUN  --   --   --  17.5 15.1   CR  --   --   --  0.87 0.89   ANIONGAP  --   --   --  12 11   ASHELY  --   --   --  8.7* 9.0   GLC  --  160*  --  115* 108*   ALBUMIN 3.6  --   --  3.5 3.5   PROTTOTAL 7.6  --   --  7.5 7.6   BILITOTAL 0.7  --   --  0.4 0.4   ALKPHOS 95  --   --  98 104   ALT 32  --   --  36 28   AST 34  --   --  41 32

## 2023-11-27 ENCOUNTER — APPOINTMENT (OUTPATIENT)
Dept: CARDIOLOGY | Facility: CLINIC | Age: 73
DRG: 196 | End: 2023-11-27
Attending: INTERNAL MEDICINE
Payer: COMMERCIAL

## 2023-11-27 DIAGNOSIS — J96.01 ACUTE RESPIRATORY FAILURE WITH HYPOXIA (H): Primary | ICD-10-CM

## 2023-11-27 LAB
1,3 BETA GLUCAN SER-MCNC: <31 PG/ML
BASOPHILS # BLD AUTO: 0 10E3/UL (ref 0–0.2)
BASOPHILS NFR BLD AUTO: 0 %
EOSINOPHIL # BLD AUTO: 0.2 10E3/UL (ref 0–0.7)
EOSINOPHIL NFR BLD AUTO: 2 %
ERYTHROCYTE [DISTWIDTH] IN BLOOD BY AUTOMATED COUNT: 13.3 % (ref 10–15)
HCT VFR BLD AUTO: 38.1 % (ref 40–53)
HGB BLD-MCNC: 12.5 G/DL (ref 13.3–17.7)
HIV 1+2 AB+HIV1 P24 AG SERPL QL IA: NONREACTIVE
IMM GRANULOCYTES # BLD: 0.1 10E3/UL
IMM GRANULOCYTES NFR BLD: 0 %
LVEF ECHO: NORMAL
LYMPHOCYTES # BLD AUTO: 1.9 10E3/UL (ref 0.8–5.3)
LYMPHOCYTES NFR BLD AUTO: 15 %
MCH RBC QN AUTO: 32.8 PG (ref 26.5–33)
MCHC RBC AUTO-ENTMCNC: 32.8 G/DL (ref 31.5–36.5)
MCV RBC AUTO: 100 FL (ref 78–100)
MONOCYTES # BLD AUTO: 1.4 10E3/UL (ref 0–1.3)
MONOCYTES NFR BLD AUTO: 11 %
NEUTROPHILS # BLD AUTO: 8.9 10E3/UL (ref 1.6–8.3)
NEUTROPHILS NFR BLD AUTO: 72 %
NRBC # BLD AUTO: 0 10E3/UL
NRBC BLD AUTO-RTO: 0 /100
OBSERVATION IMP: NEGATIVE
PLATELET # BLD AUTO: 290 10E3/UL (ref 150–450)
RBC # BLD AUTO: 3.81 10E6/UL (ref 4.4–5.9)
WBC # BLD AUTO: 12.4 10E3/UL (ref 4–11)

## 2023-11-27 PROCEDURE — 93308 TTE F-UP OR LMTD: CPT | Mod: 26 | Performed by: INTERNAL MEDICINE

## 2023-11-27 PROCEDURE — 93325 DOPPLER ECHO COLOR FLOW MAPG: CPT | Mod: 26 | Performed by: INTERNAL MEDICINE

## 2023-11-27 PROCEDURE — 250N000013 HC RX MED GY IP 250 OP 250 PS 637: Performed by: INTERNAL MEDICINE

## 2023-11-27 PROCEDURE — 99232 SBSQ HOSP IP/OBS MODERATE 35: CPT | Performed by: INTERNAL MEDICINE

## 2023-11-27 PROCEDURE — 85025 COMPLETE CBC W/AUTO DIFF WBC: CPT | Performed by: INTERNAL MEDICINE

## 2023-11-27 PROCEDURE — 250N000012 HC RX MED GY IP 250 OP 636 PS 637: Performed by: INTERNAL MEDICINE

## 2023-11-27 PROCEDURE — 255N000002 HC RX 255 OP 636: Performed by: INTERNAL MEDICINE

## 2023-11-27 PROCEDURE — 93325 DOPPLER ECHO COLOR FLOW MAPG: CPT

## 2023-11-27 PROCEDURE — 93321 DOPPLER ECHO F-UP/LMTD STD: CPT | Mod: 26 | Performed by: INTERNAL MEDICINE

## 2023-11-27 PROCEDURE — 93321 DOPPLER ECHO F-UP/LMTD STD: CPT

## 2023-11-27 PROCEDURE — 36415 COLL VENOUS BLD VENIPUNCTURE: CPT | Performed by: INTERNAL MEDICINE

## 2023-11-27 PROCEDURE — 210N000002 HC R&B HEART CARE

## 2023-11-27 RX ORDER — PREDNISONE 20 MG/1
60 TABLET ORAL DAILY
Status: DISCONTINUED | OUTPATIENT
Start: 2023-11-28 | End: 2023-12-01 | Stop reason: HOSPADM

## 2023-11-27 RX ORDER — PREDNISONE 20 MG/1
20 TABLET ORAL ONCE
Status: COMPLETED | OUTPATIENT
Start: 2023-11-27 | End: 2023-11-27

## 2023-11-27 RX ORDER — SULFAMETHOXAZOLE/TRIMETHOPRIM 800-160 MG
1 TABLET ORAL 2 TIMES DAILY
Status: DISCONTINUED | OUTPATIENT
Start: 2023-11-27 | End: 2023-11-27

## 2023-11-27 RX ORDER — SULFAMETHOXAZOLE/TRIMETHOPRIM 800-160 MG
1 TABLET ORAL DAILY
Status: DISCONTINUED | OUTPATIENT
Start: 2023-11-28 | End: 2023-12-01 | Stop reason: HOSPADM

## 2023-11-27 RX ADMIN — AMLODIPINE BESYLATE 5 MG: 5 TABLET ORAL at 08:14

## 2023-11-27 RX ADMIN — PREDNISONE 40 MG: 20 TABLET ORAL at 08:14

## 2023-11-27 RX ADMIN — LOSARTAN POTASSIUM 100 MG: 100 TABLET, FILM COATED ORAL at 08:14

## 2023-11-27 RX ADMIN — ALLOPURINOL 300 MG: 300 TABLET ORAL at 08:14

## 2023-11-27 RX ADMIN — HUMAN ALBUMIN MICROSPHERES AND PERFLUTREN 3 ML: 10; .22 INJECTION, SOLUTION INTRAVENOUS at 12:34

## 2023-11-27 RX ADMIN — PREDNISONE 20 MG: 20 TABLET ORAL at 14:38

## 2023-11-27 RX ADMIN — BENZONATATE 100 MG: 100 CAPSULE ORAL at 00:11

## 2023-11-27 RX ADMIN — POTASSIUM CHLORIDE 20 MEQ: 1500 TABLET, EXTENDED RELEASE ORAL at 08:14

## 2023-11-27 RX ADMIN — FINASTERIDE 1 MG: 1 TABLET, FILM COATED ORAL at 08:15

## 2023-11-27 RX ADMIN — FUROSEMIDE 40 MG: 40 TABLET ORAL at 08:15

## 2023-11-27 RX ADMIN — RIVAROXABAN 20 MG: 20 TABLET, FILM COATED ORAL at 18:03

## 2023-11-27 RX ADMIN — BENZONATATE 100 MG: 100 CAPSULE ORAL at 14:38

## 2023-11-27 RX ADMIN — POTASSIUM CHLORIDE 20 MEQ: 1500 TABLET, EXTENDED RELEASE ORAL at 20:41

## 2023-11-27 RX ADMIN — Medication 1 MG: at 00:11

## 2023-11-27 ASSESSMENT — ACTIVITIES OF DAILY LIVING (ADL)
ADLS_ACUITY_SCORE: 18

## 2023-11-27 NOTE — PROGRESS NOTES
Brayden asked if I could reach out to his pulmonologist, Kirstin Mcneill MD about his pending follow-up 12/18 (CT, visit).  At this point, Dr. Mcneill would like to keep CT scan and visit 12/18 and will plan to add on PFTs.    I did send Brayden a [employer] message that these appointments will be kept as of now.    Crystal BRITTON

## 2023-11-27 NOTE — PLAN OF CARE
Goal Outcome Evaluation:  Neuro- A&&OX4  Most Recent Vitals- Temp: 98.1  F (36.7  C) Temp src: Oral BP: 106/67 Pulse: 59   Resp: 22 SpO2: (!) 89 % O2 Device: Nasal cannula   Tele/Cardiac- SR  Resp- 5L NSC  Activity- independent   Pain- denies   Drips- none  Drains/Tubes- [id number]  Skin- Intact  GI/- wdl  Aggression Color- Green  COVID status-NEGATIVE  Plan-PFT pending, wean O2 as able.   Misc-     Osito Ferrell RN

## 2023-11-27 NOTE — PROGRESS NOTES
[facility]  Medicine Progress Note - Hospitalist Service  Date of Admission:  11/25/2023    Assessment & Plan   Mike Schultz is a 73 year old male admitted on 11/25/2023. He presents to the emergency department for evaluation of increasing dyspnea on exertion over the past month.  Found to have significant hypoxia both at rest and with activity as well as somewhat diffuse pulmonary infiltrates on chest x-ray.     Amiodarone toxicity of lung, suspected   Acute respiratory failure with hypoxia  Presented with 1 month of worsening dyspnea on exertion with significant worsening for the last week prior to presenting and approximately 2 weeks of dry nonproductive cough.  Found to have resting O2 saturation 81%.  Does not seem to be an infectious etiology at present. Estimates that he has been on approximately 9 months of total amiodarone therapy over his life, most recently on 1 month of treatment following last cardioversion. Infectious etiologies or heart failure could also be possibilities, but no orthopnea, no fevers.  proBNP within normal limits. Non contrast chest CT -finding consistent with acute widespread pneumonitis, of note drug toxicity including amiodarone can have this appearance. Elevated , procalcitonin 0.14. beta D glucan negative. HIV antibody antigen screen non reactive.  -increase prednisone to 60 mg daily starting 11/27/2023  -initiate TMP-SMX daily for PCP prophylaxis while on prolonged steroid taper  -added amiodarone to allergy list for safety  -Cardiology consulted.   -Limited TTE 11/27: Slight improvement in LV function compared with early September 2023. Bubble study negative.   -[facility] pulmonary consulted. Appreciate recommendation to increase prednisone dose (started 11/27/2023)  -Blood cultures x 2 NGTD  -Continue PTA furosemide 40 mg daily  -Has been on Xarelto anticoagulation consistently for the past 5 years or so.  Low suspicion for pulmonary  embolism.  -Currently on 5 L oxygen by NC. Wean as tolerated.  -discontinue IMC status but continue cardiac monitoring     Paroxysmal atrial fibrillation  History of ablation in the past, but subsequent recurrence and several cardioversions, most recently 9/13/2023, 10/6/2023.  -Continue Xarelto  -Discontinuing amiodarone as above  -As needed IV metoprolol available as needed for rate control.  He reports that his typical resting heart rate is in the 50s to 60s range, so only low doses have been ordered thus far and not yet starting on.  -Continue to monitor in telemetry, Zio patch to be continued at discharge     Non-ST elevation myocardial infarction  Suspect type II with demand ischemia from systemic hypoxia.  No chest pain or pressure, no palpitations. Repeat troponin has remained flat.  Suspect mild elevation is due to systemic hypoxia     Class III obesity  BMI of 41.05 by ER weight.  Increased risk of all cause mortality  -With treatment of hypoxic respiratory failure, encourage patient to resume lifestyle modification including diet and exercise, alcohol cessation.     Leukocytosis, mild  Likely stress demargination, monitor for any evidence of fever or other systemic signs of infection.     Obstructive sleep apnea  CPAP as per home settings    Gout  -Continue allopurinol     Hypertension  -Continue prior to admission Cozaar 100 mg daily, amlodipine 5 mg daily.     Pulmonary nodules  Noted during recent evaluation with aortic aneurysm screening.  Is following with lung nodule clinic and due for noncontrast CT of the chest  -Noncontrast CT -shows that some of the pulmonary nodules previously identified at CT are obscured by new infiltrate on today's study.    -Recommend nodule follow-up as described on prior CT report from 9/1/2023     Overuse of acetaminophen  Reported he has been using approximately 1 bottle of DayQuil per day for a dry cough.  LFTs within normal limits, acetaminophen level low, but more  "chronic use. INR and hepatic panel labs were monitored per poison control recommendations.    -monitor         Diet: Combination Diet 2 gm NA Diet; Low Saturated Fat Na <2400mg Diet, No Caffeine Diet    DVT Prophylaxis: Pneumatic Compression Devices and Ambulate every shift  Palmer Catheter: Not present  Lines: None     Cardiac Monitoring: ACTIVE order. Indication: developing ARDS vs amio toxicity, parox afib  Code Status: Full Code      Clinically Significant Risk Factors               # Coagulation Defect: INR = 2.10 (Ref range: 0.85 - 1.15) and/or PTT = N/A, will monitor for bleeding    # Hypertension: Noted on problem list        # Severe Obesity: Estimated body mass index is 41.39 kg/m  as calculated from the following:    Height as of this encounter: 1.727 m (5' 8\").    Weight as of this encounter: 123.5 kg (272 lb 3.2 oz)., PRESENT ON ADMISSION            Disposition Plan      Expected Discharge Date: 11/29/2023              Hopefully home with home oxygen in 1-2 days.  Need to obtain resting and ambulatory O2 sats for documentation.      Daiana Giordano MD  Hospitalist Service  [facility]  Securely message with [employer] (more info)  Text page via [employer] Paging/Directory   ______________________________________________________________________    Interval History   Still needing 4 to 5 L continuous oxygen.  Still feels short of breath at rest.  We discussed in detail the adjustment to prednisone dosing, addition of antibiotic prophylaxis while he is taking prednisone.  Discussed that the length of time to recover from this is more in the weeks to months timeframe and that he may need to go home with home oxygen.  He is reluctant to use oxygen during the day at his workplace but was willing to try it at night.  Explained that he may need both for a time until he has been on the steroids for a longer period of time.    Physical Exam   Vital Signs: Temp: 98.1  F (36.7  C) Temp src: Oral " BP: 106/67 Pulse: 59   Resp: 22 SpO2: (!) 89 % O2 Device: Nasal cannula Oxygen Delivery: 4 LPM  Weight: 272 lbs 3.2 oz    General Appearance: Alert, reclined in bed, no respiratory distress  Respiratory: clear, no wheezes or crackles  Cardiovascular: Range of pulse from 60-70 most of the time up to 150 with mild activity  GI: Obese, soft, nontender  Skin: No jaundice, no acute rashes or lesions  Other: Psychiatric/neurologic-calm, alert and oriented x 3, excellent fund of knowledge and understanding    Medical Decision Making             Data

## 2023-11-27 NOTE — PLAN OF CARE
Neuro- A&Ox4  Most Recent Vitals- Temp: 98.1  F (36.7  C) Temp src: Oral BP: 113/70 Pulse: 65   Resp: 18 SpO2: 93 % O2 Device: Nasal cannula   Tele/Cardiac- SR  Resp- LS exp wheezes and DELEON on 4 LPM  Activity- IND in room  Pain- denies  Drips- none  Drains/Tubes- PIV  Skin- wnl  GI/- voiding adequately  Aggression Color- Green  COVID status- Negative  Plan- discharge pending abx need  Misc-     Cindy Rosales, RN Goal Outcome Evaluation:

## 2023-11-27 NOTE — PROGRESS NOTES
EP Chart Check only -     Well known to me from clinic.  Had not done well, with recurrent HFpEF when back in AFib. AAD therapy limited d/t scar noted on MRI and bradycardia and underwent AFib ablation 11/2017 and repeat PVI with CTI 4/2019.  Had DCCV 11/2020, 8/2022 and 9/2023 (unsuccessful). Placed on amiodarone load 9/13 after failed DCCV, repeat DCCV 10/6 successfully restored SR. Given recent recurrences of AFib had been associated with increased EtOH intake, plan was for repeat ablation if he had recurrence despite risk factor modification.    He's been on amiodarone since ~9/13/2023, with updated testing planned 12/2023. He'd previously been on Amiodarone 200 mg 11/2020-1/2021, low dose amiodarone 100 mg 1/2021-12/2021, amiodarone 200 mg daily 8/2022-1/2023, then started again 9/13/2023 with load.    He contacted us in Nov for increasing DELEON - he remained in SR and we tried increasing furosemide dosing with plans for labs and ZioPatch, given DELEON was typically his sx associated with recurrent AFib/HFpEF.    Labs done just this past week actually looked OK (BMP, Hgb, NTpBNP, etc) and we had a call out to him to see if he was feeling better with the increased diuretic.    Unfortunately, admitted 11/25 with worsening DELEON. 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 Of note, CT had been done 9/2023 and this is a new finding.     Remains in SR. Agree with STOPPING amiodarone.  If/when AFib reoccurs after stopping amiodarone, will plan rate control for now with possible ablation down the road  Await echo  Greatly appreciate Pulm's expertise.      Crystal Campos,  EP PA

## 2023-11-28 LAB
C PNEUM DNA SPEC QL NAA+PROBE: NOT DETECTED
CRP SERPL-MCNC: 136.1 MG/L
ERYTHROCYTE [DISTWIDTH] IN BLOOD BY AUTOMATED COUNT: 13.2 % (ref 10–15)
ERYTHROCYTE [SEDIMENTATION RATE] IN BLOOD BY WESTERGREN METHOD: 45 MM/HR (ref 0–20)
FLUAV H1 2009 PAND RNA SPEC QL NAA+PROBE: NOT DETECTED
FLUAV H1 RNA SPEC QL NAA+PROBE: NOT DETECTED
FLUAV H3 RNA SPEC QL NAA+PROBE: NOT DETECTED
FLUAV RNA SPEC QL NAA+PROBE: NOT DETECTED
FLUBV RNA SPEC QL NAA+PROBE: NOT DETECTED
HADV DNA SPEC QL NAA+PROBE: NOT DETECTED
HCOV PNL SPEC NAA+PROBE: NOT DETECTED
HCT VFR BLD AUTO: 43.2 % (ref 40–53)
HGB BLD-MCNC: 13.9 G/DL (ref 13.3–17.7)
HMPV RNA SPEC QL NAA+PROBE: NOT DETECTED
HPIV1 RNA SPEC QL NAA+PROBE: NOT DETECTED
HPIV2 RNA SPEC QL NAA+PROBE: NOT DETECTED
HPIV3 RNA SPEC QL NAA+PROBE: NOT DETECTED
HPIV4 RNA SPEC QL NAA+PROBE: NOT DETECTED
L PNEUMO1 AG UR QL IA: NEGATIVE
M PNEUMO DNA SPEC QL NAA+PROBE: NOT DETECTED
MCH RBC QN AUTO: 32.9 PG (ref 26.5–33)
MCHC RBC AUTO-ENTMCNC: 32.2 G/DL (ref 31.5–36.5)
MCV RBC AUTO: 102 FL (ref 78–100)
NT-PROBNP SERPL-MCNC: 160 PG/ML (ref 0–900)
PLATELET # BLD AUTO: 332 10E3/UL (ref 150–450)
POTASSIUM SERPL-SCNC: 4.1 MMOL/L (ref 3.4–5.3)
PROCALCITONIN SERPL IA-MCNC: 0.12 NG/ML
RBC # BLD AUTO: 4.22 10E6/UL (ref 4.4–5.9)
RSV RNA SPEC QL NAA+PROBE: NOT DETECTED
RSV RNA SPEC QL NAA+PROBE: NOT DETECTED
RV+EV RNA SPEC QL NAA+PROBE: NOT DETECTED
S PNEUM AG SPEC QL: NEGATIVE
WBC # BLD AUTO: 13.2 10E3/UL (ref 4–11)

## 2023-11-28 PROCEDURE — 87385 HISTOPLASMA CAPSUL AG IA: CPT | Performed by: INTERNAL MEDICINE

## 2023-11-28 PROCEDURE — 84132 ASSAY OF SERUM POTASSIUM: CPT | Performed by: INTERNAL MEDICINE

## 2023-11-28 PROCEDURE — 99233 SBSQ HOSP IP/OBS HIGH 50: CPT | Performed by: INTERNAL MEDICINE

## 2023-11-28 PROCEDURE — 83876 ASSAY MYELOPEROXIDASE: CPT | Performed by: INTERNAL MEDICINE

## 2023-11-28 PROCEDURE — 36415 COLL VENOUS BLD VENIPUNCTURE: CPT | Performed by: INTERNAL MEDICINE

## 2023-11-28 PROCEDURE — 85652 RBC SED RATE AUTOMATED: CPT | Performed by: INTERNAL MEDICINE

## 2023-11-28 PROCEDURE — 87449 NOS EACH ORGANISM AG IA: CPT | Performed by: INTERNAL MEDICINE

## 2023-11-28 PROCEDURE — 84145 PROCALCITONIN (PCT): CPT | Performed by: INTERNAL MEDICINE

## 2023-11-28 PROCEDURE — 87899 AGENT NOS ASSAY W/OPTIC: CPT | Performed by: INTERNAL MEDICINE

## 2023-11-28 PROCEDURE — 86038 ANTINUCLEAR ANTIBODIES: CPT | Performed by: INTERNAL MEDICINE

## 2023-11-28 PROCEDURE — 87633 RESP VIRUS 12-25 TARGETS: CPT | Performed by: INTERNAL MEDICINE

## 2023-11-28 PROCEDURE — 250N000013 HC RX MED GY IP 250 OP 250 PS 637: Performed by: INTERNAL MEDICINE

## 2023-11-28 PROCEDURE — 250N000012 HC RX MED GY IP 250 OP 636 PS 637: Performed by: INTERNAL MEDICINE

## 2023-11-28 PROCEDURE — 86140 C-REACTIVE PROTEIN: CPT | Performed by: INTERNAL MEDICINE

## 2023-11-28 PROCEDURE — 83880 ASSAY OF NATRIURETIC PEPTIDE: CPT | Performed by: INTERNAL MEDICINE

## 2023-11-28 PROCEDURE — 85027 COMPLETE CBC AUTOMATED: CPT | Performed by: INTERNAL MEDICINE

## 2023-11-28 PROCEDURE — 210N000002 HC R&B HEART CARE

## 2023-11-28 RX ADMIN — LOSARTAN POTASSIUM 100 MG: 100 TABLET, FILM COATED ORAL at 09:42

## 2023-11-28 RX ADMIN — SULFAMETHOXAZOLE AND TRIMETHOPRIM 1 TABLET: 800; 160 TABLET ORAL at 09:44

## 2023-11-28 RX ADMIN — AMLODIPINE BESYLATE 5 MG: 5 TABLET ORAL at 09:42

## 2023-11-28 RX ADMIN — FUROSEMIDE 40 MG: 40 TABLET ORAL at 09:44

## 2023-11-28 RX ADMIN — FINASTERIDE 1 MG: 1 TABLET, FILM COATED ORAL at 09:44

## 2023-11-28 RX ADMIN — ALLOPURINOL 300 MG: 300 TABLET ORAL at 09:44

## 2023-11-28 RX ADMIN — RIVAROXABAN 20 MG: 20 TABLET, FILM COATED ORAL at 16:56

## 2023-11-28 RX ADMIN — POTASSIUM CHLORIDE 20 MEQ: 1500 TABLET, EXTENDED RELEASE ORAL at 09:43

## 2023-11-28 RX ADMIN — PREDNISONE 60 MG: 20 TABLET ORAL at 09:42

## 2023-11-28 RX ADMIN — POTASSIUM CHLORIDE 20 MEQ: 1500 TABLET, EXTENDED RELEASE ORAL at 20:32

## 2023-11-28 ASSESSMENT — ACTIVITIES OF DAILY LIVING (ADL)
ADLS_ACUITY_SCORE: 18

## 2023-11-28 NOTE — PROGRESS NOTES
Writer updated by Dr. Mckeon that she entered orders through the in-patient encounter for the PFT testing. Write will monitor to ensure that these orders carry over to the out-patient setting and update Dr. Mckeon, if needed, to request new order in case they don't transfer.     Eli Mcgraw RN on 11/28/2023 at 12:37 PM

## 2023-11-28 NOTE — PROGRESS NOTES
A&O x4. VSS on 5-6 L O2. Tele SB w/ 1 degree AVB. Denies CP/SOB/pain. Up independently. Infectious and viral work-up pending. Plan to continue to wean O2 as tolerated.

## 2023-11-28 NOTE — PROGRESS NOTES
[facility]   Pulmonary Progress Note  Mike Schultz MRN: [id number]  1950  Date of Admission:11/25/2023  Date of Service: 11/28/2023  ___________________________________    Assessment & Plan  Mike Schultz is a 73 year old male, w/ PMHx most significant for hypertension, hyperlipidemia, A-fib SP ablation and on amiodarone, and prior tobacco dependence, admitted w/ acute hypoxemic respiratory failure.  On initial pulmonary consult was a concern for amiodarone toxicity, and the patient was initiated on prednisone 40 mg once daily, this was since increased to 6 mg once daily due to increased O2 requirement.  I reviewed his chest CT scan, the patient has significant bilateral groundglass opacities right greater than left, and certainly this could be seen by amiodarone pneumo toxicity which could cause a variety of nonspecific lung toxicities including interstitial pneumonitis, organizing pneumonia, acute eosinophilic pneumonia, and DAH.  Clinically the consolidation caused by the amiodarone interstitial pneumonitis has increased attenuation on CT scan caused by the increased iodine alveolar macrophage deposition, and in his case there are some areas of hypoattenuation on the CT scan that could be concerning for that, a bronchoscopy with BAL would be helpful to support the diagnosis, but unfortunately the current oxygen requirements preclude doing a bronchoscopy safely without intubating the patient and supporting him with mechanical ventilation.  As bronchoscopy would be helpful mainly to exclude alternative etiologies such as infections or DAH.  Interestingly the patient had been on amiodarone for a month and a half before his symptoms onset, and typically the lung toxicity happens within 6 to 12 months of amiodarone initiation, but he was on and off amiodarone before that with his prior cardioversions and ablations.  The last time he was on amiodarone before this current episode was 7 to 8  months ago.    So far he has had modest response to steroids which were increased to 60 mg once daily.  I will work him up for other etiologies including viral pneumonitis, autoimmune diseases autoimmune diseases and vasculitis.  Fluid overload could be also contributing, recommend to consider more aggressive diuresis by primary service.  Agreed to continue to hold off antimicrobial at this point, but have low threshold to start empiric antibiotics if he has significant increase in his oxygen requirements.    Acute hypoxemic respiratory failure  Bilateral groundglass opacities concerning for amiodarone toxicity versus other etiology  History of atrial fibrillation SP ablation and cardioversion  History of severe BOWEN with hypoxemia and hypoventilation, he is on CPAP at home.     -Continue the steroids with the current dose 60 mg once daily   -I ordered BNP level, but consider aggressive diuresis to get the patient more euvolemic   -Will send for further work-up including infectious work-up with viral panel with PCR, noninvasive fungal work-up including blasto and histo antigens in the urine and serum, order procalcitonin, urine strep pneumo antigen and Legionella antigens. If he worsens clinically, recommend to have low threshold to initiate empiric antibiotics   -Autoimmune work-up with DAVID, ANCA, ESR   -Start CPAP treatment at night with his home settings for sleep apnea      Chart documentation was completed, in part, with [employer] voice-recognition software. Even though reviewed, some grammatical, spelling, and word errors may remain.    Christine Peng MD  Pulmonary & Critical Care      Interval History    - Nursing notes reviewed.   - Patient feels about the same compared to before    - He is on 6 LPM NC    - He got confused overnight      Physical Exam Temp:  [97.4  F (36.3  C)-97.7  F (36.5  C)] 97.7  F (36.5  C)  Pulse:  [53-87] 59  Resp:  [10-33] 20  BP: (101-140)/(66-82) 140/80  SpO2:  [83 %-96 %] 85 %  I/O  last 3 completed shifts:  In: 440 [P.O.:440]  Out: 825 [Urine:825]  Wt Readings from Last 1 Encounters:   11/28/23 123.4 kg (272 lb)    Body mass index is 41.36 kg/m . Resp: 20      Exam:  General: NAD  HEENT: MMM  CV: RRR, systolic murmur in the RUSB  Resp: Equal b/l air entry, crackles bilaterally   Abd: Soft, ND  Extremities: WWP, +1 pitting LE edema  Neuro: AAOx3, no focal deficits    Data   Labs: reviewed in EMR and notable labs listed below.  CMP:   Recent Labs   Lab 11/28/23  0623 11/26/23  0455 11/26/23  0109 11/25/23 1934 11/24/23  1215   NA  --   --   --  137 140   POTASSIUM 4.1  --   --  4.1 4.2   CHLORIDE  --   --   --  102 107   CO2  --   --   --  23 22   ANIONGAP  --   --   --  12 11   GLC  --   --  160* 115* 108*   BUN  --   --   --  17.5 15.1   CR  --   --   --  0.87 0.89   GFRESTIMATED  --   --   --  >90 >90   ASHELY  --   --   --  8.7* 9.0   MAG  --   --   --  1.8  --    PROTTOTAL  --  7.6  --  7.5 7.6   ALBUMIN  --  3.6  --  3.5 3.5   BILITOTAL  --  0.7  --  0.4 0.4   ALKPHOS  --  95  --  98 104   AST  --  34  --  41 32   ALT  --  32  --  36 28     CBC:   Recent Labs   Lab 11/27/23  0648 11/25/23 1934 11/24/23  1215   WBC 12.4* 12.2* 11.0   RBC 3.81* 4.25* 4.44   HGB 12.5* 14.1 14.8   HCT 38.1* 42.2 45.5    99 103*   MCH 32.8 33.2* 33.3*   MCHC 32.8 33.4 32.5   RDW 13.3 13.3 13.3    303 294       Culture Data   7-Day Micro Results       Collected Updated Procedure Result Status      11/26/2023 0603 11/27/2023 1421 1,3 Beta D glucan fungitell [[id number]]   Blood from Hand, Left    Final result Component Value Units   (1,3)-Beta-D-Glucan <31 pg/mL   B-D GLUCAN INTERPRETATION (1,3) Negative    INTERPRETIVE INFORMATION: (1,3)-beta-D-glucan (Fungitell)      Less than 31 pg/mL ................... Negative    31-59 pg/mL .......................... Negative    60-79 pg/mL .......................... Indeterminate    Greater than or equal to 80 pg/mL .... Positive    The Fungitell test is  "indicated for presumptive diagnosis   of fungal infection and should be used in conjunction with   other diagnostic procedures. This test does not detect   certain fungal species such as Cryptococcus, which produce   very low levels of (1,3)-beta-D-glucan. This test will not   detect the zygomycetes, such as Absidia, Mucor, and   Rhizopus, which are not known to produce   (1,3)-beta-D-glucan. In addition, the yeast phase of   Blastomyces dermatitidis produces little   (1,3)-beta-D-glucan and may not be detected by the assay.  Performed By: [facility]  [address] [place] [id number]  : Jeet Naik MD, PhD  CLIA Number: [id number]            11/25/2023 2302 11/28/2023 0331 Blood Culture Peripheral Blood [[id number]]   Peripheral Blood    Preliminary result Component Value   Culture No growth after 2 days  [P]                11/25/2023 2055 11/27/2023 2316 Blood Culture Peripheral Blood [[id number]]   Peripheral Blood    Preliminary result Component Value   Culture No growth after 2 days  [P]                11/25/2023 1936 11/25/2023 2020 Symptomatic Influenza A/B, RSV, & SARS-CoV2 PCR (COVID-19) Nasopharyngeal [[id number]]    Swab from Nasopharyngeal    Final result Component Value   Influenza A PCR Negative   Influenza B PCR Negative   RSV PCR Negative   SARS CoV2 PCR Negative   NEGATIVE: SARS-CoV-2 (COVID-19) RNA not detected, presumed negative.                      Virology Data: No results found for: \"INFLUA\", \"FLUAH1\", \"FLUAH3\", \"SL7349\", \"IFLUB\", \"RSVA\", \"RSVB\", \"PIV1\", \"PIV2\", \"PIV3\", \"HMPV\", \"HRVS\", \"ADVBE\", \"ADVC\"    Most Recent [facility] Pulmonary Function Testing (FVC/FEV1 only)  None    Imaging: reviewed in EMR and notable imaging listed below.  CT CHEST W/O CONTRAST 11/25/2023  IMPRESSION:   1.  Findings consistent with an acute, widespread pneumonitis. Of note, drug toxicity, including amiodarone, can have this appearance.  2.  Some of the pulmonary nodules " previously identified at CT are obscured by new infiltrates on today's study. Recommend nodule follow-up as described on prior CT report from 09/01/2023.    Medications    allopurinol  300 mg Oral Daily    amLODIPine  5 mg Oral Daily    finasteride  1 mg Oral Daily    furosemide  40 mg Oral Daily    losartan  100 mg Oral Daily    potassium chloride ER  20 mEq Oral BID    predniSONE  60 mg Oral Daily    rivaroxaban ANTICOAGULANT  20 mg Oral Daily with supper    sulfamethoxazole-trimethoprim  1 tablet Oral Daily

## 2023-11-28 NOTE — PLAN OF CARE
Goal Outcome Evaluation:    VSS. Monitor remains Sinus rhythm. Pt. Denies pain. Pt. Up ad juan ramon in room. O2 at 5L per oxymask at bedtime. Pt. Declines CPAP.  Continue to monitor.

## 2023-11-28 NOTE — PLAN OF CARE
[phone]  [facility]. Pt aox4, ind, 6Loxymask, and full code. Tele SR. Pt denies chest pain. Pt refuses CPAP overnight.   Plan: Continue to weaning down oxygen. Will need zio patch at discharge.

## 2023-11-28 NOTE — PROGRESS NOTES
[facility]    Medicine Progress Note - Hospitalist Service    Date of Admission:  11/25/2023    Assessment & Plan   Mike Schultz is a 73 year old male admitted on 11/25/2023. He presents to the emergency department for evaluation of increasing dyspnea on exertion over the past month and hypoxia.      Amiodarone toxicity of lung, suspected   Acute respiratory failure with hypoxia  Presented with 1 month of worsening dyspnea on exertion with significant worsening for the last week prior to presenting and approximately 2 weeks of dry nonproductive cough.  Found to have resting O2 saturation 81%.  Does not seem to be an infectious etiology at present. Estimates that he has been on approximately 9 months of total amiodarone therapy over his life, most recently on 1 month of treatment following last cardioversion. Infectious etiologies or heart failure could also be possibilities, but no orthopnea, no fevers.  proBNP within normal limits. Non contrast chest CT -finding consistent with acute widespread pneumonitis, of note drug toxicity including amiodarone can have this appearance. Elevated , procalcitonin 0.14. beta D glucan negative. HIV antibody antigen screen non reactive.  -increased prednisone to 60 mg daily starting 11/27/2023  -TMP-SMX daily for PCP prophylaxis while on prolonged steroid taper (anticipate 2-3 month duration of treatment)  -added amiodarone to allergy list for safety  -Cardiology consulted  -Limited TTE 11/27: Slight improvement in LV function compared with early September 2023. Bubble study negative.   -[facility] pulmonary consulted. Appreciate recommendation to increase prednisone dose (started 11/27/2023) and explore other etiologies in the meantime.   -Blood cultures x 2 NGTD  -Continue PTA furosemide 40 mg daily. Will discuss with cardiology whether to increase diuresis.  -Has been on Xarelto anticoagulation consistently for the past 5 years or so.  Low suspicion  for pulmonary embolism.  -Still needing 5-6 L oxygen by NC. Wean as tolerated.     Paroxysmal atrial fibrillation  History of ablation in the past, but subsequent recurrence and several cardioversions, most recently 9/13/2023, 10/6/2023.  -Continue Xarelto  -Discontinuing amiodarone indefinitely as above  -As needed IV metoprolol available as needed for rate control.  He reports that his typical resting heart rate is in the 50s to 60s range, so only low doses have been ordered thus far and not yet starting on.  -Continue to monitor in telemetry  -Zio patch to be continued at discharge     Non-ST elevation myocardial infarction  Suspect type II with demand ischemia from systemic hypoxia.  No reported chest pain or pressure, no palpitations. Repeat troponin has remained flat.     Class III obesity  BMI of 41.05 by ER weight.  Increased risk of all cause mortality  -Encourage lifestyle modification including diet and exercise, alcohol cessation.     Leukocytosis, mild  Likely stress de-margination 2/2 to steroids.   -monitor for any evidence of fever or other systemic signs of infection.     Obstructive sleep apnea  CPAP as per home settings    Gout  -Continue allopurinol     Hypertension  -Continue prior to admission Cozaar 100 mg daily, amlodipine 5 mg daily.     Pulmonary nodules  Noted during recent evaluation with aortic aneurysm screening.  Is following with lung nodule clinic and due for noncontrast CT of the chest. Non-contrast CT shows that some of the pulmonary nodules previously identified at CT are obscured by new infiltrate on more recent imaging.    -Recommend nodule follow-up as described on prior CT report from 9/1/2023     Overuse of acetaminophen  Reported he has been using approximately 1 bottle of DayQuil per day for a dry cough.  LFTs within normal limits, acetaminophen level low, but more chronic use. INR and hepatic panel labs were monitored per poison control recommendations.    -monitor          "  Diet: Combination Diet 2 gm NA Diet; Low Saturated Fat Na <2400mg Diet, No Caffeine Diet    DVT Prophylaxis: DOAC  Palmer Catheter: Not present  Lines: None     Cardiac Monitoring: ACTIVE order. Indication: developing ARDS vs amio toxicity, parox afib  Code Status: Full Code      Clinically Significant Risk Factors               # Coagulation Defect: INR = 2.10 (Ref range: 0.85 - 1.15) and/or PTT = N/A, will monitor for bleeding    # Hypertension: Noted on problem list        # Severe Obesity: Estimated body mass index is 41.36 kg/m  as calculated from the following:    Height as of this encounter: 1.727 m (5' 8\").    Weight as of this encounter: 123.4 kg (272 lb)., PRESENT ON ADMISSION            Disposition Plan      Expected Discharge Date: 11/29/2023                    Daiana Giordano MD  Hospitalist Service  [facility]  Securely message with [employer] (more info)  Text page via [employer] Paging/Directory   ______________________________________________________________________    Interval History   Still needing 5-6 L continuous O2. Has a bit more energy today but still easily fatigued with minimal exertion. Have been unable to wean oxygen since yesterday. No fevers but still having pesky cough.     Physical Exam   Vital Signs: Temp: 97.6  F (36.4  C) Temp src: Oral BP: 130/80 Pulse: 55   Resp: 20 SpO2: 93 % O2 Device: Nasal cannula Oxygen Delivery: 6 LPM  Weight: 272 lbs 0 oz    General Appearance:  Alert, sitting at edge of bed, no respiratory distress  Respiratory: clear, no wheezes or crackles  Cardiovascular: Range of pulse from 60-70 most of the time  GI: Obese, soft, nontender  Skin: No jaundice, no acute rashes or lesions  Other:  Psychiatric/neurologic-calm, alert and oriented x 3, excellent fund of knowledge and understanding      Medical Decision Making       MANAGEMENT DISCUSSED with the following over the past 24 hours: patient, CCU nurse, pulmonologist   NOTE(S)/MEDICAL " RECORDS REVIEWED over the past 24 hours: consult notes, lab trends, med admin record, vitals flowsheets  Tests ORDERED & REVIEWED in the past 24 hours:  - See lab/imaging results included in the data section of the note      Data     I have personally reviewed the following data over the past 24 hrs:    13.2 (H)  \   13.9   / 332     N/A N/A N/A /  N/A   4.1 N/A N/A \     Trop: N/A BNP: 160     Procal: 0.12 CRP: 136.10 (H) Lactic Acid: N/A         Imaging results reviewed over the past 24 hrs:   No results found for this or any previous visit (from the past 24 hour(s)).

## 2023-11-29 ENCOUNTER — APPOINTMENT (OUTPATIENT)
Dept: GENERAL RADIOLOGY | Facility: CLINIC | Age: 73
DRG: 196 | End: 2023-11-29
Attending: STUDENT IN AN ORGANIZED HEALTH CARE EDUCATION/TRAINING PROGRAM
Payer: COMMERCIAL

## 2023-11-29 LAB
ANA SER QL IF: NEGATIVE
CRP SERPL-MCNC: 74.99 MG/L
POTASSIUM SERPL-SCNC: 3.9 MMOL/L (ref 3.4–5.3)

## 2023-11-29 PROCEDURE — 250N000013 HC RX MED GY IP 250 OP 250 PS 637: Performed by: INTERNAL MEDICINE

## 2023-11-29 PROCEDURE — 99233 SBSQ HOSP IP/OBS HIGH 50: CPT | Performed by: INTERNAL MEDICINE

## 2023-11-29 PROCEDURE — 99232 SBSQ HOSP IP/OBS MODERATE 35: CPT | Performed by: STUDENT IN AN ORGANIZED HEALTH CARE EDUCATION/TRAINING PROGRAM

## 2023-11-29 PROCEDURE — 86140 C-REACTIVE PROTEIN: CPT | Performed by: INTERNAL MEDICINE

## 2023-11-29 PROCEDURE — 210N000002 HC R&B HEART CARE

## 2023-11-29 PROCEDURE — 250N000012 HC RX MED GY IP 250 OP 636 PS 637: Performed by: INTERNAL MEDICINE

## 2023-11-29 PROCEDURE — 36415 COLL VENOUS BLD VENIPUNCTURE: CPT | Performed by: INTERNAL MEDICINE

## 2023-11-29 PROCEDURE — 84132 ASSAY OF SERUM POTASSIUM: CPT | Performed by: INTERNAL MEDICINE

## 2023-11-29 PROCEDURE — 71045 X-RAY EXAM CHEST 1 VIEW: CPT

## 2023-11-29 RX ORDER — FUROSEMIDE 40 MG
40 TABLET ORAL ONCE
Status: COMPLETED | OUTPATIENT
Start: 2023-11-29 | End: 2023-11-29

## 2023-11-29 RX ADMIN — ALLOPURINOL 300 MG: 300 TABLET ORAL at 08:21

## 2023-11-29 RX ADMIN — FUROSEMIDE 40 MG: 40 TABLET ORAL at 10:56

## 2023-11-29 RX ADMIN — POTASSIUM CHLORIDE 20 MEQ: 1500 TABLET, EXTENDED RELEASE ORAL at 08:20

## 2023-11-29 RX ADMIN — RIVAROXABAN 20 MG: 20 TABLET, FILM COATED ORAL at 16:39

## 2023-11-29 RX ADMIN — AMLODIPINE BESYLATE 5 MG: 5 TABLET ORAL at 08:20

## 2023-11-29 RX ADMIN — FUROSEMIDE 40 MG: 40 TABLET ORAL at 08:20

## 2023-11-29 RX ADMIN — FINASTERIDE 1 MG: 1 TABLET, FILM COATED ORAL at 08:20

## 2023-11-29 RX ADMIN — SULFAMETHOXAZOLE AND TRIMETHOPRIM 1 TABLET: 800; 160 TABLET ORAL at 08:21

## 2023-11-29 RX ADMIN — POTASSIUM CHLORIDE 20 MEQ: 1500 TABLET, EXTENDED RELEASE ORAL at 20:13

## 2023-11-29 RX ADMIN — LOSARTAN POTASSIUM 100 MG: 100 TABLET, FILM COATED ORAL at 08:20

## 2023-11-29 RX ADMIN — PREDNISONE 60 MG: 20 TABLET ORAL at 08:20

## 2023-11-29 ASSESSMENT — ACTIVITIES OF DAILY LIVING (ADL)
ADLS_ACUITY_SCORE: 18

## 2023-11-29 NOTE — PLAN OF CARE
Goal Outcome Evaluation:  A&Ox4. Denies chest pain. On 4 L oxygen.  Denies SOB at rest,  sats mid 90s. SOB when ambulating Sats with activity mid 80s Up independently.  On K protocol, 4.4  Tele SR with BBB, 1st degree AVB

## 2023-11-29 NOTE — PROGRESS NOTES
[facility]    Medicine Progress Note - Hospitalist Service    Date of Admission:  11/25/2023    Assessment & Plan   Mike Schultz is a very pleasant 73 year old male admitted on 11/25/2023 for evaluation of increasing dyspnea on exertion over the past month and hypoxia.      Amiodarone toxicity of lung, suspected   Acute respiratory failure with hypoxia  Presented with 1 month of worsening dyspnea on exertion with significant worsening for the last week prior to presenting and approximately 2 weeks of dry nonproductive cough.  Found to have resting O2 saturation 81%.  Does not seem to be an infectious etiology at present. Estimates that he has been on approximately 9 months of total amiodarone therapy over his life, most recently on 1 month of treatment following last cardioversion. Infectious etiologies or heart failure could also be possibilities, but no orthopnea, no fevers.  proBNP within normal limits. Non contrast chest CT -finding consistent with acute widespread pneumonitis, of note drug toxicity including amiodarone can have this appearance. Elevated , procalcitonin 0.14. beta D glucan negative. HIV antibody antigen screen non reactive.  -increased prednisone to 60 mg daily starting 11/27/2023  -TMP-SMX daily for PCP prophylaxis while on prolonged steroid taper (anticipate 2-3 month duration of treatment)  -added amiodarone to allergy list for safety  -Cardiology consulted  -Limited TTE 11/27: Slight improvement in LV function compared with early September 2023. Bubble study negative.   -[facility] pulmonary consulted. Appreciate recommendation to increase prednisone dose (started 11/27/2023) and explore other etiologies in the meantime.   -Blood cultures x 2 NGTD  -Continue PTA furosemide 40 mg daily. Planned to discuss with cardiology whether to increase diuresis but appears they are no longer actively following the patient  -give additional lasix 40 mg x 1 today (for total AM  dose 80 mg) and reassess additional diuresis based on response.  -Has been on Xarelto anticoagulation consistently for the past 5 years or so.  Low suspicion for pulmonary embolism.  -Still needing 5-6 L oxygen by NC. Wean as tolerated.     Paroxysmal atrial fibrillation  History of ablation in the past, but subsequent recurrence and several cardioversions, most recently 9/13/2023, 10/6/2023.  -Continue Xarelto  -Discontinuing amiodarone indefinitely as above  -As needed IV metoprolol available as needed for rate control.  He reports that his typical resting heart rate is in the 50s to 60s range, so only low doses have been ordered thus far and not yet starting on.  -Continue to monitor in telemetry  -Zio patch to be continued at discharge     Non-ST elevation myocardial infarction  Suspect type II with demand ischemia from systemic hypoxia.  No reported chest pain or pressure, no palpitations. Repeat troponin has remained flat.     Class III obesity  BMI of 41.05 by ER weight.  Increased risk of all cause mortality  -Encourage lifestyle modification including diet and exercise, alcohol cessation.     Leukocytosis, mild  Likely stress de-margination 2/2 to steroids.   -monitor for any evidence of fever or other systemic signs of infection.     Obstructive sleep apnea  CPAP as per home settings    Gout  -Continue allopurinol     Hypertension  -Continue prior to admission Cozaar 100 mg daily, amlodipine 5 mg daily.     Pulmonary nodules  Noted during recent evaluation with aortic aneurysm screening.  Is following with lung nodule clinic and due for noncontrast CT of the chest. Non-contrast CT shows that some of the pulmonary nodules previously identified at CT are obscured by new infiltrate on more recent imaging.    -Recommend nodule follow-up as described on prior CT report from 9/1/2023     Overuse of acetaminophen, resolved  Reported he has been using approximately 1 bottle of DayQuil per day for a dry cough.   "LFTs within normal limits, acetaminophen level low, but more chronic use. INR and hepatic panel labs were monitored per poison control recommendations.    -monitor         Diet: Combination Diet 2 gm NA Diet; Low Saturated Fat Na <2400mg Diet, No Caffeine Diet    DVT Prophylaxis: DOAC  Palmer Catheter: Not present  Lines: None     Cardiac Monitoring: ACTIVE order. Indication: developing ARDS vs amio toxicity, parox afib  Code Status: Full Code      Clinically Significant Risk Factors                  # Hypertension: Noted on problem list        # Severe Obesity: Estimated body mass index is 41.36 kg/m  as calculated from the following:    Height as of this encounter: 1.727 m (5' 8\").    Weight as of this encounter: 123.4 kg (272 lb)., PRESENT ON ADMISSION            Disposition Plan      Expected Discharge Date: 12/01/2023                    Daiana Giordano MD  Hospitalist Service  [facility]  Securely message with [employer] (more info)  Text page via [employer] Paging/Directory   ______________________________________________________________________    Interval History   Usual chatty self today. Still needing 4-6 L oxygen, dropping sats to 80-85% range with mild activity. Reviewed steroid plan and that pulmonary is still considering some uncommon infectious etiologies to be sure we aren't missing anything. Ongoing pesky, dry cough.     Physical Exam   Vital Signs: Temp: 97.5  F (36.4  C) Temp src: Oral BP: 122/70 Pulse: 62   Resp: 20 SpO2: 96 % O2 Device: Nasal cannula Oxygen Delivery: 4 LPM  Weight: 272 lbs 0 oz    General Appearance:  Alert, sitting up in chair, no respiratory distress  Respiratory: clear, no wheezes or crackles  Cardiovascular: irregular rhythm  GI: Obese, soft, nontender  Skin: No jaundice, no acute rashes or lesions  Other:  Psychiatric/neurologic-calm, alert and oriented x 3, excellent fund of knowledge and understanding    Medical Decision Making       MANAGEMENT DISCUSSED " with the following over the past 24 hours: patient, spouse, bedside nurses   NOTE(S)/MEDICAL RECORDS REVIEWED over the past 24 hours: consultant notes, med admin record, intake/output  Tests ORDERED & REVIEWED in the past 24 hours:  - See lab/imaging results included in the data section of the note  SUPPLEMENTAL HISTORY, in addition to the patient's history, over the past 24 hours obtained from:   - Spouse or significant other      Data     I have personally reviewed the following data over the past 24 hrs:    N/A  \   N/A   / N/A     N/A N/A N/A /  N/A   3.9 N/A N/A \     Procal: N/A CRP: 74.99 (H) Lactic Acid: N/A         Imaging results reviewed over the past 24 hrs:   Recent Results (from the past 24 hour(s))   XR Chest Port 1 View    Narrative    XR CHEST PORT 1 VIEW  11/29/2023 8:06 AM       INDICATION: Follow up acute hypoxic respiratory failure after steroids  and diuresis  COMPARISON: 11/25/2023       Impression    IMPRESSION: Patchy bilateral pulmonary infiltrates have mildly  improved when compared to previous. No new findings. No pleural  effusion or pneumothorax.    ELADIO MALDONADO MD         SYSTEM ID:  [id number]      phone # not available for provider

## 2023-11-29 NOTE — PLAN OF CARE
Pleasant gentlemen. Pt aox4, ind, 4LNC/ oxymask and full code. Tele SR 1st AVB. Pt denies chest pain. Pt does endorse SOB on exertion. Pt had infectious disease work up.   Plan: Continue to wean down O2 needs.

## 2023-11-29 NOTE — PROGRESS NOTES
[facility]   Pulmonary Progress Note  Mike Schultz MRN: [id number]  1950  Date of Admission:11/25/2023  Date of Service: 11/29/2023  ___________________________________    Assessment & Plan  Mike Schultz is a 73 year old male, w/ PMHx most significant for hypertension, hyperlipidemia, A-fib SP ablation and on amiodarone, and prior tobacco dependence, admitted w/ acute hypoxemic respiratory failure.  On initial pulmonary consult was a concern for amiodarone toxicity, and the patient was initiated on prednisone 40 mg once daily, this was since increased to 6 mg once daily due to increased O2 requirement.  I reviewed his chest CT scan, the patient has significant bilateral groundglass opacities right greater than left, and certainly this could be seen by amiodarone pneumo toxicity which could cause a variety of nonspecific lung toxicities including interstitial pneumonitis, organizing pneumonia, acute eosinophilic pneumonia, and DAH.  Clinically the consolidation caused by the amiodarone interstitial pneumonitis has increased attenuation on CT scan caused by the increased iodine alveolar macrophage deposition, and in his case there are some areas of hypoattenuation on the CT scan that could be concerning for that, a bronchoscopy with BAL would be helpful to support the diagnosis, but unfortunately the current oxygen requirements preclude doing a bronchoscopy safely without intubating the patient and supporting him with mechanical ventilation.  As bronchoscopy would be helpful mainly to exclude alternative etiologies such as infections or DAH.  Interestingly the patient had been on amiodarone for a month and a half before his symptoms onset, and typically the lung toxicity happens within 6 to 12 months of amiodarone initiation, but he was on and off amiodarone before that with his prior cardioversions and ablations.  The last time he was on amiodarone before this current episode was 7 to 8  months ago.    Initially modest improvement on steroids which increased from 40 to 60 mg daily. In last 24 hrs starting to see improvement with decrease in oxygen level down to 4L and patient reporting some improvement in how he feels. CRP has started to drop off more significantly and down to 75. Will continue current steroid dose today and pending symptoms and O2 needs in next couple days decide on pace of steroid taper. BNP normal, continues to have lower extremity edema on exam. No crackles on exam today after appeared to be net negative a couple liters and may also benefit from more volume removal. Infectious workup thus far unrevealing, fungal studies pending along with auto-immune labs.     Acute hypoxemic respiratory failure  Bilateral groundglass opacities concerning for amiodarone toxicity versus other etiology  History of atrial fibrillation SP ablation and cardioversion  History of severe BOWEN with hypoxemia and hypoventilation, he is on CPAP at home.     - CXR today, unable to pull up images but per Radiology read is improved  - Pending symptoms and oxygen trend will decide on steroid taper course   - Continued efforts at maintenance of even to net negative volume status, diuresis to be discussed with Cardiology   - If he worsens clinically have low threshold to initiate empiric antibiotics   - Will follow up pending labs including fungal antigens, DAVID, MPO/PR3   - Continue CPAP treatment at night with his home settings for sleep apnea      Lubna Mejias MD PhD  Pulmonary Critical Care    I spent 30 minutes reviewing chart, reviewing test results, talking with and examining patient, formulating plan, and documentation on the day of the encounter.       Interval History    - Nursing notes reviewed.   - Patient feels improved, able to walk around room on oxygen without issue   - On 4L down from 6L   - Per chart net negative 2L yesterday    Physical Exam Temp:  [97.6  F (36.4  C)-97.8  F (36.6  C)] 97.8   F (36.6  C)  Pulse:  [55-71] 71  Resp:  [16-20] 20  BP: (124-138)/(57-95) 128/88  SpO2:  [82 %-98 %] 90 %  I/O last 3 completed shifts:  In: 170 [P.O.:170]  Out: 2895 [Urine:2895]  Wt Readings from Last 1 Encounters:   11/29/23 123.4 kg (272 lb)    Body mass index is 41.36 kg/m . Resp: 20      Exam:  General: NAD, sitting up at side of bed  HEENT: MMM  CV: RRR, systolic murmur in the RUSB  Resp: Equal b/l air entry, no significant crackles  Abd: Soft, ND  Extremities: WWP, +1 pitting LE edema  Neuro: AAOx3, no focal deficits    Data   Labs: reviewed in EMR and notable labs listed below.  CMP:   Recent Labs   Lab 11/29/23  0516 11/28/23  0623 11/26/23  0455 11/26/23  0109 11/25/23 1934 11/24/23  1215   NA  --   --   --   --  137 140   POTASSIUM 3.9 4.1  --   --  4.1 4.2   CHLORIDE  --   --   --   --  102 107   CO2  --   --   --   --  23 22   ANIONGAP  --   --   --   --  12 11   GLC  --   --   --  160* 115* 108*   BUN  --   --   --   --  17.5 15.1   CR  --   --   --   --  0.87 0.89   GFRESTIMATED  --   --   --   --  >90 >90   ASHELY  --   --   --   --  8.7* 9.0   MAG  --   --   --   --  1.8  --    PROTTOTAL  --   --  7.6  --  7.5 7.6   ALBUMIN  --   --  3.6  --  3.5 3.5   BILITOTAL  --   --  0.7  --  0.4 0.4   ALKPHOS  --   --  95  --  98 104   AST  --   --  34  --  41 32   ALT  --   --  32  --  36 28     CBC:   Recent Labs   Lab 11/28/23  1605 11/27/23  0648 11/25/23  1934 11/24/23  1215   WBC 13.2* 12.4* 12.2* 11.0   RBC 4.22* 3.81* 4.25* 4.44   HGB 13.9 12.5* 14.1 14.8   HCT 43.2 38.1* 42.2 45.5   * 100 99 103*   MCH 32.9 32.8 33.2* 33.3*   MCHC 32.2 32.8 33.4 32.5   RDW 13.2 13.3 13.3 13.3    290 303 294       Culture Data   7-Day Micro Results       Collected Updated Procedure Result Status      11/28/2023 1605 11/28/2023 1627 Blastomyces Agn Quant EIA Blood [[id number]]   Blood from Arm, Right    In process Component Value   No component results            11/28/2023 1605 11/28/2023 1627  Histoplasma Capsulatum Antigen [[id number]]   Peripheral Blood    In process Component Value   No component results            11/28/2023 1438 11/28/2023 1843 Respiratory Panel PCR [[id number]]    Swab from Nasopharyngeal    Final result Component Value   Adenovirus Not Detected   Coronavirus Not Detected   This test detects Coronavirus 229E, HKU1, NL63 and OC43 but does not distinguish between them. It does not detect MERS ( Respiratory Syndrome), SARS (Severe Acute Respiratory Syndrome) or 2019-nCoV (Novel 2019) Coronavirus.   Human Metapneumovirus Not Detected   Human Rhin/Enterovirus Not Detected   Influenza A Not Detected   Influenza A, H1 Not Detected   Influenza A 2009 H1N1 Not Detected   Influenza A, H3 Not Detected   Influenza B Not Detected   Parainfluenza Virus 1 Not Detected   Parainfluenza Virus 2 Not Detected   Parainfluenza Virus 3 Not Detected   Parainfluenza Virus 4 Not Detected   Respiratory Syncytial Virus A Not Detected   Respiratory Syncytial Virus B Not Detected   Chlamydia Pneumoniae Not Detected   Mycoplasma Pneumoniae Not Detected            11/28/2023 1435 11/28/2023 1450 Blastomyces Agn Quant EIA Non Blood [[id number]]   Urine, Clean Catch    In process Component Value   No component results            11/28/2023 1435 11/28/2023 1450 Histoplasma Capsulatum Agn Non Blood [[id number]]   Urine, Clean Catch    In process Component Value   No component results            11/28/2023 1435 11/28/2023 1713 Streptococcus pneumoniae antigen [[id number]]   Urine, Midstream    Final result Component Value   Streptococcus pneumoniae antigen Negative   A negative Streptococcus pneumoniae antigen result does not rule out infection with Streptococcus pneumoniae.            11/28/2023 1435 11/28/2023 1713 Legionella pneumophila antigen urine [[id number]]   Urine, Midstream    Final result Component Value   Legionella pneumophila serogroup 1 urinary antigen Negative   Suggests no  recent or current infection. Infection due to Legionella cannot be ruled out, since other serogroups and species may cause disease, antigen may not be present in urine in early infection, and the level of antigen present in the urine may be below detectable limits of the test.            11/26/2023 0603 11/27/2023 1421 1,3 Beta D glucan fungitell [[id number]]   Blood from Hand, Left    Final result Component Value Units   (1,3)-Beta-D-Glucan <31 pg/mL   B-D GLUCAN INTERPRETATION (1,3) Negative    INTERPRETIVE INFORMATION: (1,3)-beta-D-glucan (Fungitell)      Less than 31 pg/mL ................... Negative    31-59 pg/mL .......................... Negative    60-79 pg/mL .......................... Indeterminate    Greater than or equal to 80 pg/mL .... Positive    The Fungitell test is indicated for presumptive diagnosis   of fungal infection and should be used in conjunction with   other diagnostic procedures. This test does not detect   certain fungal species such as Cryptococcus, which produce   very low levels of (1,3)-beta-D-glucan. This test will not   detect the zygomycetes, such as Absidia, Mucor, and   Rhizopus, which are not known to produce   (1,3)-beta-D-glucan. In addition, the yeast phase of   Blastomyces dermatitidis produces little   (1,3)-beta-D-glucan and may not be detected by the assay.  Performed By: [facility]  [address] [place] [id number]  : Jeet Naik MD, PhD  CLIA Number: [id number]            11/25/2023 2302 11/29/2023 0331 Blood Culture Peripheral Blood [[id number]]   Peripheral Blood    Preliminary result Component Value   Culture No growth after 3 days  [P]                11/25/2023 2055 11/28/2023 2316 Blood Culture Peripheral Blood [[id number]]   Peripheral Blood    Preliminary result Component Value   Culture No growth after 3 days  [P]                11/25/2023 1936 11/25/2023 2020 Symptomatic Influenza A/B, RSV, & SARS-CoV2 PCR  (COVID-19) Nasopharyngeal [[id number]]    Swab from Nasopharyngeal    Final result Component Value   Influenza A PCR Negative   Influenza B PCR Negative   RSV PCR Negative   SARS CoV2 PCR Negative   NEGATIVE: SARS-CoV-2 (COVID-19) RNA not detected, presumed negative.                      Virology Data:   Lab Results   Component Value Date    FLUAH1 Not Detected 11/28/2023    FLUAH3 Not Detected 11/28/2023    YN4306 Not Detected 11/28/2023    IFLUB Not Detected 11/28/2023    RSVA Not Detected 11/28/2023    RSVB Not Detected 11/28/2023    PIV1 Not Detected 11/28/2023    PIV2 Not Detected 11/28/2023    PIV3 Not Detected 11/28/2023    HMPV Not Detected 11/28/2023       Most Recent [facility] Pulmonary Function Testing (FVC/FEV1 only)  None    Imaging: reviewed in EMR and notable imaging listed below.  XR CHEST PORT 1 VIEW  11/29/2023 8:06 AM       INDICATION: Follow up acute hypoxic respiratory failure after steroids  and diuresis  COMPARISON: 11/25/2023                                                                       IMPRESSION: Patchy bilateral pulmonary infiltrates have mildly  improved when compared to previous. No new findings. No pleural  effusion or pneumothorax.    Medications    allopurinol  300 mg Oral Daily    amLODIPine  5 mg Oral Daily    finasteride  1 mg Oral Daily    furosemide  40 mg Oral Daily    losartan  100 mg Oral Daily    potassium chloride ER  20 mEq Oral BID    predniSONE  60 mg Oral Daily    rivaroxaban ANTICOAGULANT  20 mg Oral Daily with supper    sulfamethoxazole-trimethoprim  1 tablet Oral Daily

## 2023-11-30 LAB
ANION GAP SERPL CALCULATED.3IONS-SCNC: 9 MMOL/L (ref 7–15)
BACTERIA BLD CULT: NO GROWTH
BUN SERPL-MCNC: 29.8 MG/DL (ref 8–23)
CALCIUM SERPL-MCNC: 8.8 MG/DL (ref 8.8–10.2)
CHLORIDE SERPL-SCNC: 98 MMOL/L (ref 98–107)
CREAT SERPL-MCNC: 0.96 MG/DL (ref 0.67–1.17)
CRP SERPL-MCNC: 41.13 MG/L
DEPRECATED HCO3 PLAS-SCNC: 26 MMOL/L (ref 22–29)
EGFRCR SERPLBLD CKD-EPI 2021: 84 ML/MIN/1.73M2
GLUCOSE SERPL-MCNC: 232 MG/DL (ref 70–99)
POTASSIUM SERPL-SCNC: 4.3 MMOL/L (ref 3.4–5.3)
POTASSIUM SERPL-SCNC: 4.6 MMOL/L (ref 3.4–5.3)
SODIUM SERPL-SCNC: 133 MMOL/L (ref 135–145)

## 2023-11-30 PROCEDURE — 250N000012 HC RX MED GY IP 250 OP 636 PS 637: Performed by: INTERNAL MEDICINE

## 2023-11-30 PROCEDURE — 99232 SBSQ HOSP IP/OBS MODERATE 35: CPT | Performed by: INTERNAL MEDICINE

## 2023-11-30 PROCEDURE — 250N000013 HC RX MED GY IP 250 OP 250 PS 637: Performed by: INTERNAL MEDICINE

## 2023-11-30 PROCEDURE — 36415 COLL VENOUS BLD VENIPUNCTURE: CPT | Performed by: INTERNAL MEDICINE

## 2023-11-30 PROCEDURE — 84132 ASSAY OF SERUM POTASSIUM: CPT | Performed by: INTERNAL MEDICINE

## 2023-11-30 PROCEDURE — 210N000002 HC R&B HEART CARE

## 2023-11-30 PROCEDURE — 86140 C-REACTIVE PROTEIN: CPT | Performed by: INTERNAL MEDICINE

## 2023-11-30 RX ADMIN — SULFAMETHOXAZOLE AND TRIMETHOPRIM 1 TABLET: 800; 160 TABLET ORAL at 08:21

## 2023-11-30 RX ADMIN — POTASSIUM CHLORIDE 20 MEQ: 1500 TABLET, EXTENDED RELEASE ORAL at 08:20

## 2023-11-30 RX ADMIN — FUROSEMIDE 40 MG: 40 TABLET ORAL at 08:21

## 2023-11-30 RX ADMIN — AMLODIPINE BESYLATE 5 MG: 5 TABLET ORAL at 08:22

## 2023-11-30 RX ADMIN — PREDNISONE 60 MG: 20 TABLET ORAL at 08:21

## 2023-11-30 RX ADMIN — RIVAROXABAN 20 MG: 20 TABLET, FILM COATED ORAL at 16:57

## 2023-11-30 RX ADMIN — ALLOPURINOL 300 MG: 300 TABLET ORAL at 08:21

## 2023-11-30 RX ADMIN — LOSARTAN POTASSIUM 100 MG: 100 TABLET, FILM COATED ORAL at 08:20

## 2023-11-30 RX ADMIN — FINASTERIDE 1 MG: 1 TABLET, FILM COATED ORAL at 08:21

## 2023-11-30 RX ADMIN — POTASSIUM CHLORIDE 20 MEQ: 1500 TABLET, EXTENDED RELEASE ORAL at 20:40

## 2023-11-30 ASSESSMENT — ACTIVITIES OF DAILY LIVING (ADL)
ADLS_ACUITY_SCORE: 20
ADLS_ACUITY_SCORE: 18
ADLS_ACUITY_SCORE: 18
ADLS_ACUITY_SCORE: 20
ADLS_ACUITY_SCORE: 18
ADLS_ACUITY_SCORE: 20
ADLS_ACUITY_SCORE: 18

## 2023-11-30 NOTE — PROGRESS NOTES
[facility]    Medicine Progress Note - Hospitalist Service    Date of Admission:  11/25/2023    Assessment & Plan   Mike Schultz is a very pleasant 73 year old male admitted on 11/25/2023 for evaluation of increasing dyspnea on exertion over the past month and hypoxia.      Amiodarone toxicity of lung, suspected   Acute respiratory failure with hypoxia  Presented with 1 month of worsening dyspnea on exertion with significant worsening for the last week prior to presenting and approximately 2 weeks of dry nonproductive cough.  Found to have resting O2 saturation 81%.  Does not seem to be an infectious etiology at present. Estimates that he has been on approximately 9 months of total amiodarone therapy over his life, most recently on 1 month of treatment following last cardioversion. Infectious etiologies or heart failure could also be possibilities, but no orthopnea, no fevers.  proBNP within normal limits.   - CXR 11/25- Patchy multifocal airspace opacity in the right upper lobe and bilateral lower lobes with engorged indistinct central pulmonary vasculature, correlate to exclude multifocal pneumonia and/or pulmonary edema   - CT chest 11/25-  -finding consistent with acute widespread pneumonitis, of note drug toxicity including amiodarone can have this appearance.   - Elevated , procalcitonin 0.14. beta D glucan negative. HIV antibody antigen screen non reactive; COVID-19 neg; RVP negative.  - Has been on Xarelto anticoagulation consistently for the past 5 years or so.  Low suspicion for pulmonary embolism  - Pulm consult ([facility]) appreciated  - started initially on Prednisone 40 mg po daily  - TMP-SMX daily for PCP prophylaxis while on prolonged steroid taper (anticipate 2-3 month duration of treatment)  - added amiodarone to allergy list for safety  - Cardiology consulted  - Limited TTE 11/27: Slight improvement in LV function compared with early September 2023. Bubble study  negative.   - Continue PTA furosemide 40 mg daily; another dose of Lasix 40 mg po X1 given on 11/29 (for a total of 80mg)  - Cardiology did not feel that this represent CHF  - increased Prednisone to 60 mg po daily on 11/28  - repeat CXR 11/29- Patchy bilateral pulmonary infiltrates have mildly improved when compared to previous.  - CRP trending down 151-->136.1-->74  - repeat CRP today   - as per Pulm- a bronchoscopy with BAL would be helpful to support the diagnosis, but unfortunately the current oxygen requirements preclude doing a bronchoscopy safely without intubating the patient   - supplemental O2, currently on 4 liters NS, wean off as able   - If he worsens clinically have low threshold to initiate empiric antibiotics  - follow up pending labs including fungal antigens, DAVID, MPO/PR3     Paroxysmal atrial fibrillation  - History of ablation in the past, but subsequent recurrence and several cardioversions, most recently 9/13/2023, 10/6/2023.  - Continue Xarelto  - Discontinuing amiodarone indefinitely as above  - As needed IV metoprolol available as needed for rate control.  He reports that his typical resting heart rate is in the 50s to 60s range  - Telemetry  - Zio patch to be continued at discharge     Non-ST elevation myocardial infarction  Suspect type II with demand ischemia from systemic hypoxia.  No reported chest pain or pressure, no palpitations. Repeat troponin has remained flat.     Class III obesity  BMI of 41.05 by ER weight.  Increased risk of all cause mortality  - Encourage lifestyle modification including diet and exercise, alcohol cessation.     Leukocytosis, mild  Likely stress de-margination 2/2 to steroids.   - monitor for any evidence of fever or other systemic signs of infection.     Obstructive sleep apnea  - CPAP as per home settings    Gout  - Continue allopurinol     Hypertension  - Continue prior to admission Cozaar 100 mg daily, amlodipine 5 mg daily.     Pulmonary nodules  Noted  "during recent evaluation with aortic aneurysm screening.  Is following with lung nodule clinic and due for noncontrast CT of the chest. Non-contrast CT shows that some of the pulmonary nodules previously identified at CT are obscured by new infiltrate on more recent imaging.    -Recommend nodule follow-up as described on prior CT report from 9/1/2023     Overuse of acetaminophen, resolved  Reported he has been using approximately 1 bottle of DayQuil per day for a dry cough.  LFTs within normal limits, acetaminophen level low, but more chronic use. INR and hepatic panel labs were monitored per poison control recommendations.    -monitor         Diet: Combination Diet 2 gm NA Diet; Low Saturated Fat Na <2400mg Diet, No Caffeine Diet    DVT Prophylaxis: DOAC  Palmer Catheter: Not present  Lines: None     Cardiac Monitoring: ACTIVE order. Indication: developing ARDS vs amio toxicity, parox afib  Code Status: Full Code      Clinically Significant Risk Factors                  # Hypertension: Noted on problem list        # Severe Obesity: Estimated body mass index is 41.34 kg/m  as calculated from the following:    Height as of this encounter: 1.727 m (5' 8\").    Weight as of this encounter: 123.3 kg (271 lb 14.4 oz).             Disposition Plan     Expected Discharge Date: 12/01/2023                    Marisol Angeles MD  Hospitalist Service  [facility]  Securely message with [employer] (more info)  Text page via [employer] Paging/Directory   ______________________________________________________________________    Interval History   Pt seen and examined, chart reviewed  States he is doing fine, no SOB at rest, reports SOB with ambulation  Reports a dry cough, no chest pain  No N/V, no abd pain   Discussed plan of care  Discussed with RN     Physical Exam   Vital Signs: Temp: 97.5  F (36.4  C) Temp src: Oral BP: 132/76 Pulse: 62   Resp: 20 SpO2: 94 % O2 Device: Oxymask Oxygen Delivery: 4 " LPM  Weight: 271 lbs 14.4 oz    General Appearance:  awake, alert, NAD, pleasant  Respiratory: clear, no wheezes or crackles  Cardiovascular: irregular rhythm  GI: Obese, soft, nontender  Skin: No jaundice, no acute rashes or lesions  Extrem: trace edema b/l LE  Neuro: AAOX3. No FNDs    Medical Decision Making       MANAGEMENT DISCUSSED with the following over the past 24 hours: patient, bedside nurses   NOTE(S)/MEDICAL RECORDS REVIEWED over the past 24 hours: consultant notes, med admin record, intake/output  Tests REVIEWED in the past 24 hours:  - BMP  - CRP      Data     I have personally reviewed the following data over the past 24 hrs:    N/A  \   N/A   / N/A     N/A N/A N/A /  N/A   4.3 N/A N/A \       Imaging results reviewed over the past 24 hrs:   No results found for this or any previous visit (from the past 24 hour(s)).

## 2023-11-30 NOTE — PROGRESS NOTES
[facility]   Pulmonary Progress Note  Mike Schultz MRN: [id number]  1950  Date of Admission:11/25/2023  Date of Service: 11/30/2023  ___________________________________    Assessment & Plan  Mike Schultz is a 73 year old male, w/ PMHx most significant for hypertension, hyperlipidemia, A-fib SP ablation and on amiodarone, and prior tobacco dependence, admitted w/ acute hypoxemic respiratory failure.  On initial pulmonary consult was a concern for amiodarone toxicity, and the patient was initiated on prednisone 40 mg once daily, this was since increased to 6 mg once daily due to increased O2 requirement.  I reviewed his chest CT scan, the patient has significant bilateral groundglass opacities right greater than left, and certainly this could be seen by amiodarone pneumo toxicity which could cause a variety of nonspecific lung toxicities including interstitial pneumonitis, organizing pneumonia, acute eosinophilic pneumonia, and DAH.  Clinically the consolidation caused by the amiodarone interstitial pneumonitis has increased attenuation on CT scan caused by the increased iodine alveolar macrophage deposition, and in his case there are some areas of hyperattenuation on the CT scan that could be concerning for that, a bronchoscopy with BAL would have been be helpful to support the diagnosis, but unfortunately oxygen requirements precluded doing a bronchoscopy safely without intubating the patient and supporting him with mechanical ventilation.  As bronchoscopy would have be helpful mainly to exclude alternative etiologies such as infections or DAH.  Interestingly the patient had been on amiodarone for a month and a half before his symptoms onset, and typically the lung toxicity happens within 6 to 12 months of amiodarone initiation, but he was on and off amiodarone before that with his prior cardioversions and ablations.  The last time he was on amiodarone before this current episode was 7 to 8  months ago.    Patient had improvement with steroids, currently on 60 mg once daily, his oxygen requirements are improving, so her his inflammatory markers.  BNP was low, he is more euvolemic now, his infectious work-up has been negative so far.    Acute hypoxemic respiratory failure  Bilateral groundglass opacities concerning for amiodarone toxicity versus other etiology  History of atrial fibrillation SP ablation and cardioversion  History of severe BOWEN with hypoxemia and hypoventilation, he is on CPAP at home.     -Continue steroids, the plan to wean steroids is as following 60 mg once daily for 2 weeks, then 50 mg once daily for 2 weeks, then 40 mg once daily for 2 weeks, then 30 mg once daily for 2 weeks, then 20 mg once daily for 2 weeks, then 10 mg once daily for 2 weeks, then 10 mg every other day for 1 week and then stop.  While on the steroid regimen he should be on PJP prophylaxis with Bactrim double strength once daily.   -Pending blasto and histo antigens.  The rest of his infectious work-up is negative.  Unlikely to be infectious etiology given the improvement with the steroids.   -Trend CRP, negative DAVID, pending ANCA.   -Recommend CPAP treatment at night with his home settings for sleep apnea   -I will follow-up peripherally, please call if there is any question or concern.      Chart documentation was completed, in part, with [employer] voice-recognition software. Even though reviewed, some grammatical, spelling, and word errors may remain.    Christine Peng MD  Pulmonary & Critical Care      Interval History    -Nursing notes reviewed.   -Patient has improved, he is on 2 L/min nasal cannula this afternoon   -He feels his breathing is improving    Physical Exam Temp:  [97.5  F (36.4  C)-97.7  F (36.5  C)] 97.5  F (36.4  C)  Pulse:  [62-67] 62  Resp:  [18-20] 20  BP: (118-135)/(64-86) 132/76  SpO2:  [90 %-99 %] 94 %  I/O last 3 completed shifts:  In: 120 [P.O.:120]  Out: 500 [Urine:500]  Wt Readings from  Last 1 Encounters:   11/30/23 123.3 kg (271 lb 14.4 oz)    Body mass index is 41.34 kg/m . Resp: 20      Exam:  General: NAD  HEENT: MMM  CV: RRR, systolic murmur in the RUSB  Resp: Some crackles at the lower base bilaterally.  Abd: Soft, ND  Extremities: WWP, trace pitting LE edema  Neuro: AAOx3, no focal deficits    Data   Labs: reviewed in EMR and notable labs listed below.  CMP:   Recent Labs   Lab 11/30/23  0621 11/29/23  0516 11/28/23  0623 11/26/23  0455 11/26/23  0109 11/25/23 1934 11/24/23  1215   NA  --   --   --   --   --  137 140   POTASSIUM 4.3 3.9 4.1  --   --  4.1 4.2   CHLORIDE  --   --   --   --   --  102 107   CO2  --   --   --   --   --  23 22   ANIONGAP  --   --   --   --   --  12 11   GLC  --   --   --   --  160* 115* 108*   BUN  --   --   --   --   --  17.5 15.1   CR  --   --   --   --   --  0.87 0.89   GFRESTIMATED  --   --   --   --   --  >90 >90   ASHELY  --   --   --   --   --  8.7* 9.0   MAG  --   --   --   --   --  1.8  --    PROTTOTAL  --   --   --  7.6  --  7.5 7.6   ALBUMIN  --   --   --  3.6  --  3.5 3.5   BILITOTAL  --   --   --  0.7  --  0.4 0.4   ALKPHOS  --   --   --  95  --  98 104   AST  --   --   --  34  --  41 32   ALT  --   --   --  32  --  36 28     CBC:   Recent Labs   Lab 11/28/23  1605 11/27/23  0648 11/25/23 1934 11/24/23  1215   WBC 13.2* 12.4* 12.2* 11.0   RBC 4.22* 3.81* 4.25* 4.44   HGB 13.9 12.5* 14.1 14.8   HCT 43.2 38.1* 42.2 45.5   * 100 99 103*   MCH 32.9 32.8 33.2* 33.3*   MCHC 32.2 32.8 33.4 32.5   RDW 13.2 13.3 13.3 13.3    290 303 294       Culture Data   7-Day Micro Results       Collected Updated Procedure Result Status      11/28/2023 1605 11/28/2023 1627 Blastomyces Agn Quant EIA Blood [[id number]]   Blood from Arm, Right    In process Component Value   No component results            11/28/2023 1605 11/28/2023 1627 Histoplasma Capsulatum Antigen [[id number]]   Peripheral Blood    In process Component Value   No component results             11/28/2023 1438 11/28/2023 1843 Respiratory Panel PCR [[id number]]    Swab from Nasopharyngeal    Final result Component Value   Adenovirus Not Detected   Coronavirus Not Detected   This test detects Coronavirus 229E, HKU1, NL63 and OC43 but does not distinguish between them. It does not detect MERS ( Respiratory Syndrome), SARS (Severe Acute Respiratory Syndrome) or 2019-nCoV (Novel 2019) Coronavirus.   Human Metapneumovirus Not Detected   Human Rhin/Enterovirus Not Detected   Influenza A Not Detected   Influenza A, H1 Not Detected   Influenza A 2009 H1N1 Not Detected   Influenza A, H3 Not Detected   Influenza B Not Detected   Parainfluenza Virus 1 Not Detected   Parainfluenza Virus 2 Not Detected   Parainfluenza Virus 3 Not Detected   Parainfluenza Virus 4 Not Detected   Respiratory Syncytial Virus A Not Detected   Respiratory Syncytial Virus B Not Detected   Chlamydia Pneumoniae Not Detected   Mycoplasma Pneumoniae Not Detected            11/28/2023 1435 11/28/2023 1450 Blastomyces Agn Quant EIA Non Blood [[id number]]   Urine, Clean Catch    In process Component Value   No component results            11/28/2023 1435 11/28/2023 1450 Histoplasma Capsulatum Agn Non Blood [[id number]]   Urine, Clean Catch    In process Component Value   No component results            11/28/2023 1435 11/28/2023 1713 Streptococcus pneumoniae antigen [[id number]]   Urine, Midstream    Final result Component Value   Streptococcus pneumoniae antigen Negative   A negative Streptococcus pneumoniae antigen result does not rule out infection with Streptococcus pneumoniae.            11/28/2023 1435 11/28/2023 1713 Legionella pneumophila antigen urine [[id number]]   Urine, Midstream    Final result Component Value   Legionella pneumophila serogroup 1 urinary antigen Negative   Suggests no recent or current infection. Infection due to Legionella cannot be ruled out, since other serogroups and species may cause  disease, antigen may not be present in urine in early infection, and the level of antigen present in the urine may be below detectable limits of the test.            11/26/2023 0603 11/27/2023 1421 1,3 Beta D glucan fungitell [[id number]]   Blood from Hand, Left    Final result Component Value Units   (1,3)-Beta-D-Glucan <31 pg/mL   B-D GLUCAN INTERPRETATION (1,3) Negative    INTERPRETIVE INFORMATION: (1,3)-beta-D-glucan (Fungitell)      Less than 31 pg/mL ................... Negative    31-59 pg/mL .......................... Negative    60-79 pg/mL .......................... Indeterminate    Greater than or equal to 80 pg/mL .... Positive    The Fungitell test is indicated for presumptive diagnosis   of fungal infection and should be used in conjunction with   other diagnostic procedures. This test does not detect   certain fungal species such as Cryptococcus, which produce   very low levels of (1,3)-beta-D-glucan. This test will not   detect the zygomycetes, such as Absidia, Mucor, and   Rhizopus, which are not known to produce   (1,3)-beta-D-glucan. In addition, the yeast phase of   Blastomyces dermatitidis produces little   (1,3)-beta-D-glucan and may not be detected by the assay.  Performed By: [facility]  [address] [place] [id number]  : Jeet Naik MD, PhD  CLIA Number: [id number]            11/25/2023 2302 11/30/2023 0331 Blood Culture Peripheral Blood [[id number]]   Peripheral Blood    Preliminary result Component Value   Culture No growth after 4 days  [P]                11/25/2023 2055 11/29/2023 2316 Blood Culture Peripheral Blood [[id number]]   Peripheral Blood    Preliminary result Component Value   Culture No growth after 4 days  [P]                11/25/2023 1936 11/25/2023 2020 Symptomatic Influenza A/B, RSV, & SARS-CoV2 PCR (COVID-19) Nasopharyngeal [[id number]]    Swab from Nasopharyngeal    Final result Component Value   Influenza A PCR  Negative   Influenza B PCR Negative   RSV PCR Negative   SARS CoV2 PCR Negative   NEGATIVE: SARS-CoV-2 (COVID-19) RNA not detected, presumed negative.                      Virology Data:   Lab Results   Component Value Date    FLUAH1 Not Detected 11/28/2023    FLUAH3 Not Detected 11/28/2023    UT0493 Not Detected 11/28/2023    IFLUB Not Detected 11/28/2023    RSVA Not Detected 11/28/2023    RSVB Not Detected 11/28/2023    PIV1 Not Detected 11/28/2023    PIV2 Not Detected 11/28/2023    PIV3 Not Detected 11/28/2023    HMPV Not Detected 11/28/2023       Most Recent [facility] Pulmonary Function Testing (FVC/FEV1 only)  None    Imaging: reviewed in EMR and notable imaging listed below.  Chest x-ray on 11/29/2023  IMPRESSION: Patchy bilateral pulmonary infiltrates have mildly  improved when compared to previous. No new findings. No pleural  effusion or pneumothorax.    Medications    allopurinol  300 mg Oral Daily    amLODIPine  5 mg Oral Daily    finasteride  1 mg Oral Daily    furosemide  40 mg Oral Daily    losartan  100 mg Oral Daily    potassium chloride ER  20 mEq Oral BID    predniSONE  60 mg Oral Daily    rivaroxaban ANTICOAGULANT  20 mg Oral Daily with supper    sulfamethoxazole-trimethoprim  1 tablet Oral Daily

## 2023-11-30 NOTE — PLAN OF CARE
Patient admitted with acute respiratory failure. Patient A/Ox4. HR bradycardic in 50s at times. Vitals otherwise stable. 4L O2 via oxymask when sleeping, switches to NC during the day. LS clear/diminished. DELEON, no SOB at rest. Patient independent in room. Pulmonology following. Patient currently on po lasix and prednisone. Discharge pending clinical improvement and O2 needs.

## 2023-11-30 NOTE — PLAN OF CARE
Goal Outcome Evaluation:       7-11pm: A&O x4, VSS on 4L NC. Denies pain, has SOB with exertion. Up independently. On 2gm NA low Sat Fat diet. Tele NSR. Pt refused CPAP overnight, placed on 4L oxymask per pt request while sleeping. Discharge pending.

## 2023-12-01 ENCOUNTER — DOCUMENTATION ONLY (OUTPATIENT)
Dept: SLEEP MEDICINE | Facility: CLINIC | Age: 73
End: 2023-12-01
Payer: COMMERCIAL

## 2023-12-01 VITALS
WEIGHT: 272.8 LBS | RESPIRATION RATE: 20 BRPM | OXYGEN SATURATION: 95 % | BODY MASS INDEX: 41.34 KG/M2 | HEART RATE: 57 BPM | HEIGHT: 68 IN | SYSTOLIC BLOOD PRESSURE: 142 MMHG | DIASTOLIC BLOOD PRESSURE: 98 MMHG | TEMPERATURE: 98 F

## 2023-12-01 DIAGNOSIS — G47.33 OSA (OBSTRUCTIVE SLEEP APNEA): Primary | ICD-10-CM

## 2023-12-01 LAB
ANION GAP SERPL CALCULATED.3IONS-SCNC: 7 MMOL/L (ref 7–15)
BACTERIA BLD CULT: NO GROWTH
BUN SERPL-MCNC: 30.3 MG/DL (ref 8–23)
CALCIUM SERPL-MCNC: 8.4 MG/DL (ref 8.8–10.2)
CHLORIDE SERPL-SCNC: 103 MMOL/L (ref 98–107)
CREAT SERPL-MCNC: 1.06 MG/DL (ref 0.67–1.17)
DEPRECATED HCO3 PLAS-SCNC: 29 MMOL/L (ref 22–29)
EGFRCR SERPLBLD CKD-EPI 2021: 75 ML/MIN/1.73M2
GLUCOSE SERPL-MCNC: 90 MG/DL (ref 70–99)
MYELOPEROXIDASE AB SER IA-ACNC: 0.4 U/ML
MYELOPEROXIDASE AB SER IA-ACNC: NEGATIVE
POTASSIUM SERPL-SCNC: 4.3 MMOL/L (ref 3.4–5.3)
PROTEINASE3 AB SER IA-ACNC: <1 U/ML
PROTEINASE3 AB SER IA-ACNC: NEGATIVE
SCANNED LAB RESULT: NORMAL
SODIUM SERPL-SCNC: 139 MMOL/L (ref 135–145)

## 2023-12-01 PROCEDURE — 80048 BASIC METABOLIC PNL TOTAL CA: CPT | Performed by: INTERNAL MEDICINE

## 2023-12-01 PROCEDURE — 99239 HOSP IP/OBS DSCHRG MGMT >30: CPT | Performed by: STUDENT IN AN ORGANIZED HEALTH CARE EDUCATION/TRAINING PROGRAM

## 2023-12-01 PROCEDURE — 250N000013 HC RX MED GY IP 250 OP 250 PS 637: Performed by: INTERNAL MEDICINE

## 2023-12-01 PROCEDURE — 250N000012 HC RX MED GY IP 250 OP 636 PS 637: Performed by: INTERNAL MEDICINE

## 2023-12-01 PROCEDURE — 36415 COLL VENOUS BLD VENIPUNCTURE: CPT | Performed by: INTERNAL MEDICINE

## 2023-12-01 RX ORDER — PREDNISONE 10 MG/1
TABLET ORAL
Qty: 319 TABLET | Refills: 0 | Status: SHIPPED | OUTPATIENT
Start: 2023-12-02 | End: 2024-03-19

## 2023-12-01 RX ORDER — SULFAMETHOXAZOLE/TRIMETHOPRIM 800-160 MG
1 TABLET ORAL DAILY
Qty: 90 TABLET | Refills: 0 | Status: SHIPPED | OUTPATIENT
Start: 2023-12-01 | End: 2024-02-23

## 2023-12-01 RX ORDER — METFORMIN HCL 500 MG
500 TABLET, EXTENDED RELEASE 24 HR ORAL
Qty: 90 TABLET | Refills: 0 | Status: ON HOLD | OUTPATIENT
Start: 2023-12-01 | End: 2024-02-13

## 2023-12-01 RX ADMIN — LOSARTAN POTASSIUM 100 MG: 100 TABLET, FILM COATED ORAL at 09:13

## 2023-12-01 RX ADMIN — ALLOPURINOL 300 MG: 300 TABLET ORAL at 09:13

## 2023-12-01 RX ADMIN — AMLODIPINE BESYLATE 5 MG: 5 TABLET ORAL at 09:13

## 2023-12-01 RX ADMIN — SULFAMETHOXAZOLE AND TRIMETHOPRIM 1 TABLET: 800; 160 TABLET ORAL at 09:13

## 2023-12-01 RX ADMIN — POTASSIUM CHLORIDE 20 MEQ: 1500 TABLET, EXTENDED RELEASE ORAL at 09:13

## 2023-12-01 RX ADMIN — FINASTERIDE 1 MG: 1 TABLET, FILM COATED ORAL at 09:14

## 2023-12-01 RX ADMIN — PREDNISONE 60 MG: 20 TABLET ORAL at 09:13

## 2023-12-01 RX ADMIN — FUROSEMIDE 40 MG: 40 TABLET ORAL at 09:13

## 2023-12-01 ASSESSMENT — ACTIVITIES OF DAILY LIVING (ADL)
ADLS_ACUITY_SCORE: 20

## 2023-12-01 NOTE — PROGRESS NOTES
Oxygen Documentation  I certify that this patient, Mike Schultz has been under my care (or a nurse practitioner or physican's assistant working with me). This is the face-to-face encounter for oxygen medical necessity.      At the time of this encounter supplemental oxygen is reasonable and necessary and is expected to improve the patient's condition in a home setting.       Patient has continued oxygen desaturation due to Chronic Respiratory Failure with Hypoxia J96.11.    If portability is ordered, is the patient mobile within the home? Yes    Ramez Singh MD

## 2023-12-01 NOTE — PROGRESS NOTES
sPatient has been assessed for Home Oxygen needs. Oxygen readings:    *Pulse oximetry (SpO2) = 86% on room air at rest while awake.    *SpO2 improved to 92% on 1liters/minute at rest.    *SpO2 = 82% on room air during activity/with exercise.    *SpO2 improved to 94% on 2liters/minute during activity/with exercise.

## 2023-12-01 NOTE — PLAN OF CARE
Goal Outcome Evaluation:      Plan of Care Reviewed With: patient    Overall Patient Progress: improvingOverall Patient Progress: improving    [facility] Nursing [facility]    Patient Information  Name: Mike Schultz  Age: 73 year old    Admission Information  Date: 11/25/2023   Reason:Acute respiratory failure with hypoxia (H) [J96.01]     Assessment  Orientation/Neuro: Alert and Oriented x4  Cardiac/Tele: SB  Resp: DELEON, O2 drops with activity, weaned down O2 from 5L to 2L   GI/: WDL No nausea, vomiting, abdominal pain, diarrhea, constipation or change in bowel habits   Mobility: ambulates independently  Pain: denies   Diet: Orders Placed This Encounter      Combination Diet 2 gm NA Diet; Low Saturated Fat Na <2400mg Diet, No Caffeine Diet    Vital Signs  B/P: 114/72, T: 97.6, P: 64, R: 20, O2: 94% on 1L NC, requiring 2L with activity      Plan  Encouage IS and aerobika, pt has been using every hour. O2 decreased from 5L NC to 1L NC, pt requiring 2L NC with activity.  Plan to taper prednisone and wean off O2 as able. Continue PO lasix.     Gloria Hernandez RN

## 2023-12-01 NOTE — PLAN OF CARE
Goal Outcome Evaluation:      Plan of Care Reviewed With: patient    Overall Patient Progress: improvingOverall Patient Progress: improving    Discharge Nursing Note    Patient Information  Name: Mike Schultz  Age: 73 year old    Discharge Education:  Discharge instructions reviewed: Yes  Additional education/resources provided: IS, home O2 delivered and educated  Patient/patient representative verbalizes understanding: Yes  Patient discharging on new medications: Yes  Medication education completed: Yes    Discharge Plans:   Discharge location: home  Discharge ride contacted: Yes  Approximate discharge time: 1230    Patient Belongs:  Patient belongings returned to patient: Yes    Gloria Hernandez RN

## 2023-12-01 NOTE — PROVIDER NOTIFICATION
Cards and EP has not seen pt since 11/27.  Notified cards that pt had not been seen. Pt was not on cards or EP list.  Cards stated they will assign to a provider and or notify EP to see pt.

## 2023-12-01 NOTE — DISCHARGE SUMMARY
"[facility]  Hospitalist Discharge Summary      Date of Admission:  11/25/2023  Date of Discharge:  12/1/2023 12:25 PM  Discharging Provider: Ramez Singh MD  Discharge Service: Hospitalist Service    Discharge Diagnoses   Amiodarone toxicity of lung, suspected   Acute respiratory failure with hypoxia  PAF  NSTEMI  Class III obesity   Leukocytosis, mild   Obstructive sleep apnea   Gout  HTN  Pulmonary nodules  Overuse of acetaminophen, resolved     Clinically Significant Risk Factors     # Severe Obesity: Estimated body mass index is 41.48 kg/m  as calculated from the following:    Height as of this encounter: 1.727 m (5' 8\").    Weight as of this encounter: 123.7 kg (272 lb 12.8 oz).       Follow-ups Needed After Discharge   Follow-up Appointments     Follow-up and recommended labs and tests       Follow up with primary care provider, Ranjan Courtney, within 7 days for   hospital follow- up.  No follow up labs or test are needed.    Follow up with pulmonary clinic in 6-8 weeks.          Unresulted Labs Ordered in the Past 30 Days of this Admission       No orders found from 10/26/2023 to 11/26/2023.        These results will be followed up by n/a    Discharge Disposition   Discharged to home  Condition at discharge: Stable    Hospital Course   Mike Schultz is a very pleasant 73 year old male admitted on 11/25/2023 for evaluation of increasing dyspnea on exertion over the past month and hypoxia. Pulmonary feels symptoms due to amiodarone toxicity. Discharge with supplemental O2, prolong steroid taper, bactrim. Will start low dose metformin due to steroid hyperglycemia.     Recommend follow up with PCP in 1-2 weeks and Pulmonology in 6-8 weeks.      Amiodarone toxicity of lung, suspected   Acute respiratory failure with hypoxia  Presented with 1 month of worsening dyspnea on exertion with significant worsening for the last week prior to presenting and approximately 2 weeks of dry " nonproductive cough.  Found to have resting O2 saturation 81%.  Does not seem to be an infectious etiology at present. Estimates that he has been on approximately 9 months of total amiodarone therapy over his life, most recently on 1 month of treatment following last cardioversion. Infectious etiologies or heart failure could also be possibilities, but no orthopnea, no fevers.  proBNP within normal limits.   - CXR 11/25- Patchy multifocal airspace opacity in the right upper lobe and bilateral lower lobes with engorged indistinct central pulmonary vasculature, correlate to exclude multifocal pneumonia and/or pulmonary edema   - CT chest 11/25-  -finding consistent with acute widespread pneumonitis, of note drug toxicity including amiodarone can have this appearance.   - Elevated , procalcitonin 0.14. beta D glucan negative. HIV antibody antigen screen non reactive; COVID-19 neg; RVP negative.  - Has been on Xarelto anticoagulation consistently for the past 5 years or so.  Low suspicion for pulmonary embolism  - Pulm consult (P) appreciated  - TMP-SMX daily for PCP prophylaxis while on prolonged steroid taper (anticipate 2-3 month duration of treatment)  - added amiodarone to allergy list for safety  - Cardiology consulted  - Limited TTE 11/27: Slight improvement in LV function compared with early September 2023. Bubble study negative.   - Cardiology did not feel that this represent CHF  - increased Prednisone to 60 mg po daily on 11/28 taper as follows: (60mg every day [id number], 50mg every day [id number], 40mg x14d, 30mg x14d, 20mg x14d, 10mg x14d, then 10mg q48 x14d)  - repeat CXR 11/29- Patchy bilateral pulmonary infiltrates have mildly improved when compared to previous.  - CRP trending down 151-->136.1-->74->41 (11/30)  - as per Pulm- a bronchoscopy with BAL would be helpful to support the diagnosis, but unfortunately the current oxygen requirements preclude doing a bronchoscopy safely without intubating the  patient   - discharge with home O2, titrate off as able      Paroxysmal atrial fibrillation  - History of ablation in the past, but subsequent recurrence and several cardioversions, most recently 9/13/2023, 10/6/2023.  - Continue Xarelto  - Discontinuing amiodarone indefinitely as above  - As needed IV metoprolol available as needed for rate control.  He reports that his typical resting heart rate is in the 50s to 60s range  - Telemetry  - Zio patch to be continued at discharge     Non-ST elevation myocardial infarction  Suspect type II with demand ischemia from systemic hypoxia.  No reported chest pain or pressure, no palpitations. Repeat troponin has remained flat.     Class III obesity  BMI of 41.05 by ER weight.  Increased risk of all cause mortality  - Encourage lifestyle modification including diet and exercise, alcohol cessation.     Leukocytosis, mild  Likely stress de-margination 2/2 to steroids.   - monitor for any evidence of fever or other systemic signs of infection.     Obstructive sleep apnea  - CPAP as per home settings     Gout  - Continue allopurinol     Hypertension  - Continue prior to admission Cozaar 100 mg daily, amlodipine 5 mg daily.     Pulmonary nodules  Noted during recent evaluation with aortic aneurysm screening.  Is following with lung nodule clinic and due for noncontrast CT of the chest. Non-contrast CT shows that some of the pulmonary nodules previously identified at CT are obscured by new infiltrate on more recent imaging.    -Recommend nodule follow-up as described on prior CT report from 9/1/2023     Overuse of acetaminophen, resolved  Reported he has been using approximately 1 bottle of DayQuil per day for a dry cough.  LFTs within normal limits, acetaminophen level low, but more chronic use. INR and hepatic panel labs were monitored per poison control recommendations.    -monitor     Consultations This Hospital Stay   PULMONARY IP CONSULT  CARDIOLOGY IP CONSULT    Code Status    Prior    Time Spent on this Encounter   I, Ramez Singh MD, personally saw the patient today and spent greater than 30 minutes discharging this patient.       Ramez Singh MD  [facility]  [address], [address]  [place] [address]  Phone: [phone]  ______________________________________________________________________    Physical Exam   Vital Signs: Temp: 97.8  F (36.6  C) Temp src: Oral BP: (!) 141/77 Pulse: 62   Resp: 20 SpO2: 92 % O2 Device: Oxymask Oxygen Delivery: 2 LPM  Weight: 272 lbs 12.8 oz  Constitutional: Awake, alert, cooperative, no apparent distress  Respiratory: Clear to auscultation bilaterally, no crackles or wheezing  Cardiovascular: IRR, and no murmur noted  GI: Normal bowel sounds, soft, non-distended, non-tender  Skin/Integumen: No rashes, no cyanosis, no edema  Other:          Primary Care Physician   Ranjan Courtney    Discharge Orders      Adult Pulmonary Medicine  Referral      Reason for your hospital stay    Acute respiratory failure, likely due to amiodarone toxicity.     Activity    Your activity upon discharge: activity as tolerated     Follow-up and recommended labs and tests     Follow up with primary care provider, Ranjan Courtney, within 7 days for hospital follow- up.  No follow up labs or test are needed.    Follow up with pulmonary clinic in 6-8 weeks.     Oxygen Adult/Peds    Oxygen Documentation  I certify that this patient, Mike Schultz has been under my care (or a nurse practitioner or physican's assistant working with me). This is the face-to-face encounter for oxygen medical necessity.      At the time of this encounter supplemental oxygen is reasonable and necessary and is expected to improve the patient's condition in a home setting.       Patient has continued oxygen desaturation due to Chronic Respiratory Failure with Hypoxia J96.11.    If portability is ordered, is the patient mobile within the home?  yes     Diet    Follow this diet upon discharge: Orders Placed This Encounter      Combination Diet 2 gm NA Diet; Low Saturated Fat Na <2400mg Diet, No Caffeine Diet       Significant Results and Procedures   Most Recent 3 CBC's:  Recent Labs   Lab Test 11/28/23  1605 11/27/23  0648 11/25/23 1934   WBC 13.2* 12.4* 12.2*   HGB 13.9 12.5* 14.1   * 100 99    290 303     Most Recent 3 BMP's:  Recent Labs   Lab Test 12/01/23  0640 11/30/23  1627 11/30/23  0621 11/28/23  0623 11/26/23  0109 11/25/23 1934    133*  --   --   --  137   POTASSIUM 4.3 4.6 4.3   < >  --  4.1   CHLORIDE 103 98  --   --   --  102   CO2 29 26  --   --   --  23   BUN 30.3* 29.8*  --   --   --  17.5   CR 1.06 0.96  --   --   --  0.87   ANIONGAP 7 9  --   --   --  12   ASHELY 8.4* 8.8  --   --   --  8.7*   GLC 90 232*  --   --  160* 115*    < > = values in this interval not displayed.     Most Recent 2 LFT's:  Recent Labs   Lab Test 11/26/23  0455 11/25/23 1934   AST 34 41   ALT 32 36   ALKPHOS 95 98   BILITOTAL 0.7 0.4   ,   Results for orders placed or performed during the hospital encounter of 11/25/23   XR Chest 2 Views    Narrative    EXAM: XR CHEST 2 VIEWS  LOCATION: [facility]  DATE: 11/25/2023    INDICATION: Shortness of breath  COMPARISON: 09/01/2023      Impression    IMPRESSION: Patchy multifocal airspace opacity in the right upper lobe and bilateral lower lobes with engorged indistinct central pulmonary vasculature, correlate to exclude multifocal pneumonia and/or pulmonary edema. Recommend follow-up to resolution.   No pneumothorax or pleural effusion. Cardiac silhouette within normal limits.   CT Chest w/o Contrast    Narrative    EXAM: CT CHEST W/O CONTRAST  LOCATION: [facility]  DATE: 11/25/2023    INDICATION: lung nodules, infiltrates, new hypoxia, amio toxicity?  COMPARISON: Chest radiographs from 11/25/2023, chest CT from 09/01/2023  TECHNIQUE: CT chest without  IV contrast. Multiplanar reformats were obtained. Dose reduction techniques were used.  CONTRAST: None.    FINDINGS:   LUNGS AND PLEURA: Patchy bilateral groundglass and reticulated opacities in a random distribution throughout the lungs. Groundglass infiltrates are worst in the right upper lobe. Some of the pulmonary nodules previously seen, particularly at the lung   bases, are obscured  new infiltrates today. No pneumothorax or pleural effusion.    MEDIASTINUM/AXILLAE: Heart size is normal. No pericardial effusion. Aortic valve leaflet and coronary artery calcifications. Several borderline enlarged mediastinal lymph nodes.    CORONARY ARTERY CALCIFICATION: Mild to moderate    UPPER ABDOMEN: No acute findings.    MUSCULOSKELETAL: Severe degenerative disc changes of the thoracic spine.      Impression    IMPRESSION:   1.  Findings consistent with an acute, widespread pneumonitis. Of note, drug toxicity, including amiodarone, can have this appearance.    2.  Some of the pulmonary nodules previously identified at CT are obscured by new infiltrates on today's study. Recommend nodule follow-up as described on prior CT report from 09/01/2023.     XR Chest Port 1 View    Narrative    XR CHEST PORT 1 VIEW  11/29/2023 8:06 AM       INDICATION: Follow up acute hypoxic respiratory failure after steroids  and diuresis  COMPARISON: 11/25/2023       Impression    IMPRESSION: Patchy bilateral pulmonary infiltrates have mildly  improved when compared to previous. No new findings. No pleural  effusion or pneumothorax.    ELADIO MALDONADO MD         SYSTEM ID:  [id number]   Echocardiogram Limited with Bubble Study     Value    LVEF  55-60%    Narrative    [id number]  [id number]  [id number]  [id number]^MORALEZ^MORRO^[facility]  [facility]  Echocardiography Laboratory  [address]  [address]  [place] [address]  Phone [phone]  Fax [phone]     Name: TOO HOWARD  MRN:  [id number]  : 1950  Study Date: 2023 12:03 PM  Age: 73 yrs  Gender: Male  Patient Location: [facility]  Reason For Study: Dyspnea, Cardiac Murmur  Ordering Physician: MORRO MORALEZ  Referring Physician: MORRO MORALEZ  Performed By: JAVI Mcnamara     BSA: 2.1 m2  Height: 68 in  Weight: 222 lb  HR: 68  BP: 104/61 mmHg  ______________________________________________________________________________  Procedure  Limited Portable Bubble Echo Adult. Optison (NDC #[id number]) given  intravenously.     ______________________________________________________________________________  Interpretation Summary     Left ventricular systolic function is normal.  The visual ejection fraction is 55-60%.  The right ventricle is normal in structure, function and size.  Moderate aortic stenosis. Vmax 3.5 m/sec, mean gradient 32 mmHg. MARILYN by  continuity is 1.22 cm2. DVI 0.29. Stroke volume index is normal at 47 ml/m2.  A contrast injection (Bubble Study) was performed that was negative for flow  across the interatrial septum.  The inferior vena cava was normal in size with preserved respiratory  variability.  Ascending aorta dilatation is present (4.0 cm).  There is no pericardial effusion.     Compared to prior study dated 2023, left ventricular function appears  slightly improved, otherwise findings similar. Bubble study negative.  ______________________________________________________________________________  Left Ventricle  The left ventricle is normal in size. There is normal left ventricular wall  thickness. Left ventricular systolic function is normal. The visual ejection  fraction is 55-60%. Normal left ventricular wall motion.     Right Ventricle  The right ventricle is normal in structure, function and size.     Atria  Normal left atrial size. Right atrial size is normal. Intact atrial septum. A  contrast injection (Bubble Study) was performed that was negative for flow  across the interatrial septum.      Mitral Valve  There is mild mitral annular calcification. There is mild (1+) mitral  regurgitation.     Tricuspid Valve  The tricuspid valve is normal in structure and function. Right ventricular  systolic pressure could not be approximated due to inadequate tricuspid  regurgitation.     Aortic Valve  Thickened aortic valve leaflets. Vmax 3.5 m/sec, mean gradient 32 mmHg. MARILYN by  continuity is 1.22 cm2. DVI 0.29.     Pulmonic Valve  The pulmonic valve is normal in structure and function.     Vessels  Aortic root dilatation is present. Ascending aorta dilatation is present. The  inferior vena cava was normal in size with preserved respiratory variability.     Pericardium  There is no pericardial effusion.     ______________________________________________________________________________  MMode/2D Measurements & Calculations  Ao root diam: 3.2 cm  asc Aorta Diam: 4.0 cm  LVOT diam: 2.4 cm  LVOT area: 4.5 cm2  Ao root diam index Ht(cm/m): 1.9  Ao root diam index BSA (cm/m2): 1.5  Asc Ao diam index BSA (cm/m2): 1.9  Asc Ao diam index Ht(cm/m): 2.3  TAPSE: 2.0 cm     Doppler Measurements & Calculations  MV E max seamus: 106.0 cm/sec  MV A max seamus: 63.4 cm/sec  MV E/A: 1.7  MV dec time: 0.18 sec  Ao V2 max: 353.0 cm/sec  Ao max P.8 mmHg  Ao V2 mean: 275.0 cm/sec  Ao mean P.0 mmHg  Ao V2 VTI: 81.6 cm  MARILYN(I,D): 1.2 cm2  MARILYN(V,D): 1.3 cm2  LV V1 max P.1 mmHg  LV V1 max: 101.0 cm/sec  LV V1 VTI: 22.0 cm  SV(LVOT): 99.5 ml  SI(LVOT): 46.6 ml/m2  PA acc time: 0.11 sec  Pulm Sys Seamus: 46.1 cm/sec  Pulm A Revs Seamus: 64.1 cm/sec  AV Seamus Ratio (DI): 0.29  MARILYN Index (cm2/m2): 0.57  E/E' av.3  Lateral E/e': 11.6  Medial E/e': 13.0  RV S Seamus: 13.3 cm/sec     ______________________________________________________________________________  Report approved by: Laurie Pereira 2023 02:19 PM             Discharge Medications   Discharge Medication List as of 2023 10:59 AM        START taking these  medications    Details   metFORMIN (GLUCOPHAGE XR) 500 MG 24 hr tablet Take 1 tablet (500 mg) by mouth daily (with dinner), Disp-90 tablet, R-0, E-Prescribe      predniSONE (DELTASONE) 10 MG tablet Take 6 tablets (60 mg) by mouth daily for 10 days, THEN 5 tablets (50 mg) daily for 14 days, THEN 4 tablets (40 mg) daily for 14 days, THEN 3 tablets (30 mg) daily for 14 days, THEN 3 tablets (30 mg) daily for 14 days, THEN 2 tablets (20 mg) daily for 14  days, THEN 1 tablet (10 mg) daily for 14 days, THEN 1 tablet (10 mg) every 48 hours for 14 days., Disp-319 tablet, R-0, E-Prescribe      sulfamethoxazole-trimethoprim (BACTRIM DS) 800-160 MG tablet Take 1 tablet by mouth daily, Disp-90 tablet, R-0, E-Prescribe           CONTINUE these medications which have NOT CHANGED    Details   allopurinol (ZYLOPRIM) 300 MG tablet Take 1 tablet (300 mg) by mouth daily, Disp-90 tablet, R-4, E-Prescribe      amLODIPine (NORVASC) 5 MG tablet Take 1 tablet (5 mg) by mouth daily, Disp-30 tablet, R-5, E-Prescribe      cetirizine (ZYRTEC) 10 MG tablet Take 10 mg by mouth as needed for allergies, Historical      finasteride (PROPECIA) 1 MG tablet Take 1 tablet (1 mg) by mouth daily, Disp-90 tablet, R-4, E-Prescribe      furosemide (LASIX) 20 MG tablet Take 2 tablets (40 mg) by mouth daily, No Print Out      losartan (COZAAR) 100 MG tablet Take 1 tablet (100 mg) by mouth daily, Disp-90 tablet, R-4, E-Prescribe      potassium chloride ER (KLOR-CON M) 20 MEQ CR tablet Take 1 tablet (20 mEq) by mouth 2 times daily, Disp-180 tablet, R-3, E-PrescribeKeep on file until pt due for refills      rivaroxaban ANTICOAGULANT (XARELTO ANTICOAGULANT) 20 MG TABS tablet Take 1 tablet (20 mg) by mouth daily (with dinner), Disp-90 tablet, R-3, E-PrescribeKeep on file until pt due for refills           STOP taking these medications       amiodarone (PACERONE) 200 MG tablet Comments:   Reason for Stopping:             Allergies   Allergies   Allergen Reactions     Amiodarone Shortness Of Breath     Suspected amiodarone toxicity involving lungs    No Known Drug Allergy

## 2023-12-01 NOTE — PLAN OF CARE
Neuro: A&Ox4.   CV: SR  Resp: 2 L NC. LS clear  GI: 2g NA and low fat diet  : voiding  Skin: intact  Other: Patient desats with activity but recovers quickly once at rest. IS encouraged.

## 2023-12-02 NOTE — RESULT ENCOUNTER NOTE
Post discharge Blastomyces, histoplasma, myeloperoxidase results forwarded to oncologist Dr. Mckeon (appt 12.18) and Pulm Dr Peng (appt 1/12/24) as an FYI.

## 2023-12-03 ENCOUNTER — PATIENT OUTREACH (OUTPATIENT)
Dept: CARE COORDINATION | Facility: CLINIC | Age: 73
End: 2023-12-03
Payer: COMMERCIAL

## 2023-12-03 NOTE — PROGRESS NOTES
Clinic Care Coordination Contact  [facility]: Post-Discharge Note  SITUATION                                                      Admission:    Admission Date: 11/25/23   Reason for Admission: Acute respiratory failure, likely due to amiodarone toxicity  Discharge:   Discharge Date: 12/01/23  Discharge Diagnosis: Acute respiratory failure, likely due to amiodarone toxicity    BACKGROUND                                                      Per hospital discharge summary and inpatient provider notes:    Mike Schultz is a 73 year old male who presents to the emergency department for evaluation of significant and progressive dyspnea on exertion which has been present for the past month or so, but significant worsening over this past week.  Has also had a cough which is dry and nonproductive over the past 2 weeks or so.  No fevers, no rigors.     Patient has a history of morbid obesity, obstructive sleep apnea, moderate aortic stenosis, paroxysmal atrial fibrillation, hypertension.  He has had heart failure in the past associated with his atrial fibrillation, and does have some mild global hypokinesia of his LV as of September echocardiogram, LVEF of approximately 50% at that time.     Patient reports that he has not had any lower extremity swelling, he has no orthopnea.  He actually tells me specifically that he feels well when he lays down, and feels that he wants to lay down all day because he is so comfortable.  He does not necessarily feel short of breath when he is standing or sitting, but if he moves at all, he tells me that he needs to rest for a prolonged period of time before he is able to catch his breath again.  As an example, he tells me that he will walk up 14 steps from his basement and feel the need to rest for nearly 20 minutes.  The symptoms, again, have been present for the past month but substantially worsened over the past 1 week.    ASSESSMENT           Discharge Assessment  How are you  doing now that you are home?: I am doing pretty good. I am using my oxygen at night. During the day I am checking my oxygen levels and they are around 87-88 and then go up in the 90's.  How are your symptoms? (Red Flag symptoms escalate to triage hotline per guidelines): Improved  Do you feel your condition is stable enough to be safe at home until your provider visit?: Yes  Does the patient have their discharge instructions? : Yes  Does the patient have questions regarding their discharge instructions? : No  Were you started on any new medications or were there changes to any of your previous medications? : Yes  Does the patient have all of their medications?: Yes  Do you have questions regarding any of your medications? : No  Do you have all of your needed medical supplies or equipment (DME)?  (i.e. oxygen tank, CPAP, cane, etc.): Yes  Discharge follow-up appointment scheduled within 14 calendar days? : No  Is patient agreeable to assistance with scheduling? : No                  PLAN                                                      Outpatient Plan:  Follow up with primary care provider, Ranjan Courtney, within 7 days for hospital follow- up. No follow up labs or test are  needed.  Follow up with pulmonary clinic in 6-8 weeks.    Future Appointments   Date Time Provider Department [place]   12/18/2023 10:40 AM [id number] [facility]   12/18/2023 11:00 AM Jeanette Mckeon MD [facility]   12/28/2023 12:30 PM TINAJERO LAB [facility] [place]   12/28/2023  1:30 PM [facility]   1/12/2024  2:30 PM [facility]   7/29/2024  8:30 AM Curtis Parra MD O[facility] OX   11/11/2024 10:15 AM Pedro Luis Romeo MD [facility]         For any urgent concerns, please contact our 24 hour nurse triage line: [phone] ([phone])         ROMA Dinero  [phone]  [facility]

## 2023-12-04 DIAGNOSIS — J96.01 ACUTE RESPIRATORY FAILURE WITH HYPOXIA (H): Primary | ICD-10-CM

## 2023-12-11 ENCOUNTER — TELEPHONE (OUTPATIENT)
Dept: CARDIOLOGY | Facility: CLINIC | Age: 73
End: 2023-12-11
Payer: COMMERCIAL

## 2023-12-11 DIAGNOSIS — I48.19 PERSISTENT ATRIAL FIBRILLATION (H): ICD-10-CM

## 2023-12-11 RX ORDER — FUROSEMIDE 20 MG
40 TABLET ORAL DAILY
Qty: 90 TABLET | Refills: 1 | Status: ON HOLD | OUTPATIENT
Start: 2023-12-11 | End: 2024-02-13

## 2023-12-11 NOTE — TELEPHONE ENCOUNTER
[facility] Call Center    Phone Message    May a detailed message be left on voicemail: yes     Reason for Call: Other: Pt would like a call back to discuss his medication as some of them can be refilled and others can't and he would like to discuss and get refills     Action Taken: Other: Cardio    Travel Screening: Not Applicable

## 2023-12-11 NOTE — TELEPHONE ENCOUNTER
Pt wanted to go through his medications.  Pt state that he did not have any refills for his Furosemide, which per Pt pharmacy would think the medications had been stopped. Pt should have all of his medications available to him , but the furosemide. Escript has been sent to pt new pharmacy. Miguel Ángel

## 2023-12-13 ENCOUNTER — HOSPITAL ENCOUNTER (OUTPATIENT)
Dept: CARDIAC REHAB | Facility: CLINIC | Age: 73
Discharge: HOME | End: 2023-12-13
Attending: INTERNAL MEDICINE
Payer: COMMERCIAL

## 2023-12-13 ENCOUNTER — HOSPITAL ENCOUNTER (EMERGENCY)
Facility: CLINIC | Age: 73
Discharge: STILL ADMITTED OR ADMINISTRATIVE | End: 2023-12-13
Payer: COMMERCIAL

## 2023-12-13 DIAGNOSIS — J96.01 ACUTE RESPIRATORY FAILURE WITH HYPOXIA (H): ICD-10-CM

## 2023-12-13 LAB
DLCOCOR-%PRED-PRE: 76 %
DLCOCOR-PRE: 17.77 ML/MIN/MMHG
DLCOUNC-%PRED-PRE: 74 %
DLCOUNC-PRE: 17.41 ML/MIN/MMHG
DLCOUNC-PRED: 23.37 ML/MIN/MMHG
ERV-%PRED-PRE: 22 %
ERV-PRE: 0.29 L
ERV-PRED: 1.27 L
EXPTIME-PRE: 9.22 SEC
FEF2575-%PRED-PRE: 109 %
FEF2575-PRE: 2.27 L/SEC
FEF2575-PRED: 2.07 L/SEC
FEFMAX-%PRED-PRE: 102 %
FEFMAX-PRE: 7.68 L/SEC
FEFMAX-PRED: 7.47 L/SEC
FEV1-%PRED-PRE: 89 %
FEV1-PRE: 2.37 L
FEV1FEV6-PRE: 81 %
FEV1FEV6-PRED: 77 %
FEV1FVC-PRE: 80 %
FEV1FVC-PRED: 77 %
FEV1SVC-PRE: 78 %
FEV1SVC-PRED: 69 %
FIFMAX-PRE: 3.38 L/SEC
FRCPLETH-%PRED-PRE: 73 %
FRCPLETH-PRE: 2.61 L
FRCPLETH-PRED: 3.54 L
FVC-%PRED-PRE: 85 %
FVC-PRE: 2.95 L
FVC-PRED: 3.45 L
IC-%PRED-PRE: 108 %
IC-PRE: 2.74 L
IC-PRED: 2.54 L
RVPLETH-%PRED-PRE: 89 %
RVPLETH-PRE: 2.31 L
RVPLETH-PRED: 2.59 L
TLCPLETH-%PRED-PRE: 82 %
TLCPLETH-PRE: 5.35 L
TLCPLETH-PRED: 6.52 L
VA-%PRED-PRE: 78 %
VA-PRE: 4.51 L
VC-%PRED-PRE: 79 %
VC-PRE: 3.04 L
VC-PRED: 3.82 L

## 2023-12-13 PROCEDURE — 94375 RESPIRATORY FLOW VOLUME LOOP: CPT

## 2023-12-13 PROCEDURE — 94729 DIFFUSING CAPACITY: CPT

## 2023-12-13 PROCEDURE — 94726 PLETHYSMOGRAPHY LUNG VOLUMES: CPT

## 2023-12-13 NOTE — ED PROVIDER NOTES
Patient mistakenly was registered in the ER instead of being directed to the pulmonology clinic.  Please disregard today's visit in the ER.     Palomo Wood MD  12/13/23 [id number]

## 2023-12-18 ENCOUNTER — HOSPITAL ENCOUNTER (OUTPATIENT)
Dept: CT IMAGING | Facility: CLINIC | Age: 73
Discharge: HOME | End: 2023-12-18
Attending: INTERNAL MEDICINE | Admitting: INTERNAL MEDICINE
Payer: COMMERCIAL

## 2023-12-18 ENCOUNTER — ONCOLOGY VISIT (OUTPATIENT)
Dept: ONCOLOGY | Facility: CLINIC | Age: 73
End: 2023-12-18
Attending: INTERNAL MEDICINE
Payer: COMMERCIAL

## 2023-12-18 VITALS
RESPIRATION RATE: 18 BRPM | HEIGHT: 68 IN | OXYGEN SATURATION: 93 % | SYSTOLIC BLOOD PRESSURE: 156 MMHG | TEMPERATURE: 97 F | DIASTOLIC BLOOD PRESSURE: 79 MMHG | WEIGHT: 279 LBS | HEART RATE: 80 BPM | BODY MASS INDEX: 42.28 KG/M2

## 2023-12-18 DIAGNOSIS — R91.8 MULTIPLE LUNG NODULES: ICD-10-CM

## 2023-12-18 DIAGNOSIS — J96.01 ACUTE RESPIRATORY FAILURE WITH HYPOXIA (H): Primary | ICD-10-CM

## 2023-12-18 PROCEDURE — 99213 OFFICE O/P EST LOW 20 MIN: CPT | Performed by: INTERNAL MEDICINE

## 2023-12-18 PROCEDURE — 71250 CT THORAX DX C-: CPT

## 2023-12-18 PROCEDURE — 99214 OFFICE O/P EST MOD 30 MIN: CPT | Performed by: INTERNAL MEDICINE

## 2023-12-18 ASSESSMENT — PAIN SCALES - GENERAL: PAINLEVEL: NO PAIN (0)

## 2023-12-18 NOTE — PROGRESS NOTES
[facility] Nodule Clinic Follow Up  Visit    Reason for Visit  Mike Schultz is a 73 year old male who is referred by Courtney for lung nodules  Pulmonary HPI    - Since our last visit in September, he was hospitalized with acute respiratory failure that was attributed to amiodarone toxicity and he was discharged on a prednisone taper that lasts for several months. He is currently on 50mg daily with a decrease in dose every 14 days along with Bactrim for prophylaxis    - aorta screening discovered incidental lung nodules  Back in 2022 he had a CT for chest pain and there were no nodules on that one  - no episodes of pneumonia or bad cold    Other active medical problems include:   - amiodarone-induced pneumonitis with hospitalization   - atrial fibrillation on Xarelto, cardioversion attempted yesterday  - Bell's Palsy on Right last year with some residual symptoms      Exposure history: Denies asbestos or radon exposure   TB risk factors: No  Prior Imaging:Yes  Constitutional Symptoms: No  Personal history of cancer:Yes  Up to date on age-appropriate cancer screening:No    ROS Pulmonary  Dyspnea: Yes, Cough: No, Chest pain: No, Wheezing: No, Sputum Production: No, Hemoptysis: No  A complete ROS was otherwise negative except as noted in the HPI.  The patient was seen and examined by Jeanette Mckeon MD   Current Outpatient Medications   Medication    allopurinol (ZYLOPRIM) 300 MG tablet    amLODIPine (NORVASC) 5 MG tablet    cetirizine (ZYRTEC) 10 MG tablet    finasteride (PROPECIA) 1 MG tablet    furosemide (LASIX) 20 MG tablet    losartan (COZAAR) 100 MG tablet    metFORMIN (GLUCOPHAGE XR) 500 MG 24 hr tablet    potassium chloride ER (KLOR-CON M) 20 MEQ CR tablet    predniSONE (DELTASONE) 10 MG tablet    rivaroxaban ANTICOAGULANT (XARELTO ANTICOAGULANT) 20 MG TABS tablet    sulfamethoxazole-trimethoprim (BACTRIM DS) 800-160 MG tablet     No current facility-administered medications for  this visit.     Allergies   Allergen Reactions    Amiodarone Shortness Of Breath     Suspected amiodarone toxicity involving lungs    No Known Drug Allergy      Social History     Socioeconomic History    Marital status: Single     Spouse name: Not on file    Number of children: Not on file    Years of education: Not on file    Highest education level: Not on file   Occupational History    Not on file   Tobacco Use    Smoking status: Former     Packs/day: 1.00     Years: 10.00     Additional pack years: 0.00     Total pack years: 10.00     Types: Cigarettes     Start date:      Quit date: 2011     Years since quittin.3    Smokeless tobacco: Never    Tobacco comments:     quit    Vaping Use    Vaping Use: Never used   Substance and Sexual Activity    Alcohol use: Not Currently    Drug use: Never    Sexual activity: Not Currently     Partners: Female     Birth control/protection: None   Other Topics Concern    Parent/sibling w/ CABG, MI or angioplasty before 65F 55M? Not Asked   Social History Narrative    Worked in retail () and insurance sales     Spends some time on [place]     Social Determinants of Health     Financial Resource Strain: Not on file   Food Insecurity: Not on file   Transportation Needs: Not on file   Physical Activity: Not on file   Stress: Not on file   Social Connections: Not on file   Interpersonal Safety: Not on file   Housing Stability: Not on file     Past Medical History:   Diagnosis Date    Arthritis     gout corrected  taking allopurinol    Basal cell carcinoma     Cancer (H)     basal nose    H/O ETOH abuse     Herpes simplex without mention of complication     Hyperlipidemia     Hypertension 2000    Obesity     Persistent atrial fibrillation (H)     persistent, DCCV 2017, ablation 17    Sleep apnea     CPAP    Tobacco use disorder     dced 2004     Past Surgical History:   Procedure Laterality Date    ABLATION OF DYSRHYTHMIC FOCUS   2018, 04/12/2019    Procedure: EP Ablation Focal AFIB;  Surgeon: Fady Day MD;  Location:  HEART CARDIAC CATH LAB    ACHILLES TENDON SURGERY  1997    ANESTHESIA CARDIOVERSION N/A 2/26/2019    Procedure: ANESTHESIA CARDIOVERSION (CONNER);  Surgeon: GENERIC ANESTHESIA PROVIDER;  Location:  OR    ANESTHESIA CARDIOVERSION N/A 11/19/2020    Procedure: ANESTHESIA, FOR CARDIOVERSION (dr campbell);  Surgeon: GENERIC ANESTHESIA PROVIDER;  Location:  OR    ANESTHESIA CARDIOVERSION N/A 8/22/2022    Procedure: CARDIOVERSION;  Surgeon: GENERIC ANESTHESIA PROVIDER;  Location:  OR    ANESTHESIA CARDIOVERSION N/A 9/13/2023    Procedure: Anesthesia cardioversion;  Surgeon: GENERIC ANESTHESIA PROVIDER;  Location:  OR    ANESTHESIA CARDIOVERSION N/A 10/6/2023    Procedure: Anesthesia cardioversion;  Surgeon: GENERIC ANESTHESIA PROVIDER;  Location:  OR    BASAL CELL CARCINOMA EXCISION Right 2009    nose, took cartilage from inner ear.    CARDIOVERSION  2012, 2019    cardioversion for atr fib    COLONOSCOPY  2008    COLONOSCOPY N/A 3/22/2023    Procedure: COLONOSCOPY, FLEXIBLE, WITH LESION REMOVAL USING SNARE;  Surgeon: Anne Lucero MD;  Location:  GI    EP ABLATION FOCAL AFIB N/A 4/12/2019    Procedure: EP Ablation Focal AFIB;  Surgeon: Fady aDy MD;  Location:  HEART CARDIAC CATH LAB    HERNIA REPAIR, UMBILICAL  08/20/2012    Procedure: HERNIORRHAPHY UMBILICAL;  UMBILICAL HERNIA REPAIR;  Surgeon: Ra Crocker MD;  Location: [facility]    HERNIORRHAPHY UMBILICAL  8/20/2012    Procedure: HERNIORRHAPHY UMBILICAL;  UMBILICAL HERNIA REPAIR;  Surgeon: Ra Crocker MD;  Location: [facility]    ORTHOPEDIC SURGERY      ORTHOPEDIC SURGERY      OTHER SURGICAL HISTORY Right 03/2017    total KNEE ARTHROSCOPY     TOTAL KNEE ARTHROPLASTY Left 03/16/2018    ZZC NONSPECIFIC PROCEDURE  1997    achilles tendon    ZZC NONSPECIFIC PROCEDURE      knees, arthroscopy    ZZC NONSPECIFIC PROCEDURE      basal cell skin ca,  "nose    ZZ NONSPECIFIC PROCEDURE      flex sig; colonoscopy due 3/2008    ZZC NONSPECIFIC PROCEDURE      cardioversion for atr fib    ZZC TOTAL KNEE ARTHROPLASTY Left 2018    Dr. Malick Suarez     Family History   Problem Relation Age of Onset    Family History Negative Mother     Heart Disease Father         decesased at 42 - MI    Heart Disease Sister          MI    Lipids Sister     Lipids Sister     No Known Problems Sister     Heart Disease Brother          MI    Heart Disease Brother         CABG AT 49    Lung Cancer No family hx of        Exam:   BP (!) 156/79 (Cuff Size: Adult Large)   Pulse 80   Temp 97  F (36.1  C) (Tympanic)   Resp 18   Ht 1.727 m (5' 8\")   Wt 126.6 kg (279 lb)   SpO2 93%   BMI 42.42 kg/m    GENERAL APPEARANCE: Well developed, well nourished, alert, and in no apparent distress.  LUNGS: right apical posterior lung crackles  PSYCH: mentation appears normal. and affect normal/bright  Results:  - My interpretation of the images relevant for this visit includes: CT chest imaging reviewed Dec 2022, Aug 2023, 2023 multiple nodules in the  base of both lungs, some elongated and irregular, some more round. Largest is about 2x1 cm  - My interpretation of the PFT's relevant for this visit includes: None       Assessment and plan: Brayden is a 74 yo male with incidental lung nodules, atrial fibrillation and recent probable amiodarone pulmonary toxicity.    Lung nodule - seen on chest CT, not seen in , no significant change between Aug and Sept, unusual in appearance, no respiratory symptoms. Difficult to biopsy due to location plus he is dealing with an atrial fibrillation situation and planning another cardioversion in a few weeks   Considering patient with high risk for lung cancer, recommend follow up CT in  2 months per Zainabner 2017. Nodify reduced risk (11%)  Amiodarone toxicity - hospitalized with acute respiratory failure, symptoms and imaging and " oximetry improved. PFTs borderline normal.   He was prescribed a prolonged steroid taper from the hospital and will continue through March. Repeat CT and PFT in march.

## 2023-12-18 NOTE — PATIENT INSTRUCTIONS
Continue the prednisone taper as planned.     We can repeat a CT in March as well as the lung function testing

## 2023-12-18 NOTE — NURSING NOTE
"Oncology Rooming Note    December 18, 2023 11:00 AM   Mike Schultz is a 73 year old male who presents for:    Chief Complaint   Patient presents with    Lung Nodule     Initial Vitals: BP (!) 156/79 (Cuff Size: Adult Large)   Pulse 80   Temp 97  F (36.1  C) (Tympanic)   Resp 18   Ht 1.727 m (5' 8\")   Wt 126.6 kg (279 lb)   SpO2 93%   BMI 42.42 kg/m   Estimated body mass index is 42.42 kg/m  as calculated from the following:    Height as of this encounter: 1.727 m (5' 8\").    Weight as of this encounter: 126.6 kg (279 lb). Body surface area is 2.46 meters squared.  No Pain (0) Comment: Data Unavailable   No LMP for male patient.  Allergies reviewed: Yes  Medications reviewed: Yes    Medications: Medication refills not needed today.  Pharmacy name entered into [facility]:    [id number] - [place], [place] - [address] AT [facility] [address]  [id number] - [place] - [address] [facility][id number] - [place], [place] - [address] [facility] [address]    Clinical concerns: f/u       Lacy Pyle CMA              "

## 2023-12-18 NOTE — LETTER
12/18/2023         RE: Mike Scuhltz  [address] [facility] [facility] [address] [facility] [address]        Dear Colleague,    Thank you for referring your patient, Mike Schultz, to the [facility]. Please see a copy of my visit note below.    [facility] Nodule Clinic Follow Up  Visit    Reason for Visit  Mike Schultz is a 73 year old male who is referred by Courtney for lung nodules  Pulmonary HPI    - Since our last visit in September, he was hospitalized with acute respiratory failure that was attributed to amiodarone toxicity and he was discharged on a prednisone taper that lasts for several months. He is currently on 50mg daily with a decrease in dose every 14 days along with Bactrim for prophylaxis    - aorta screening discovered incidental lung nodules  Back in 2022 he had a CT for chest pain and there were no nodules on that one  - no episodes of pneumonia or bad cold    Other active medical problems include:   - amiodarone-induced pneumonitis with hospitalization   - atrial fibrillation on Xarelto, cardioversion attempted yesterday  - Bell's Palsy on Right last year with some residual symptoms      Exposure history: Denies asbestos or radon exposure   TB risk factors: No  Prior Imaging:Yes  Constitutional Symptoms: No  Personal history of cancer:Yes  Up to date on age-appropriate cancer screening:No    ROS Pulmonary  Dyspnea: Yes, Cough: No, Chest pain: No, Wheezing: No, Sputum Production: No, Hemoptysis: No  A complete ROS was otherwise negative except as noted in the HPI.  The patient was seen and examined by Jeanette Mckeon MD   Current Outpatient Medications   Medication     allopurinol (ZYLOPRIM) 300 MG tablet     amLODIPine (NORVASC) 5 MG tablet     cetirizine (ZYRTEC) 10 MG tablet     finasteride (PROPECIA) 1 MG tablet     furosemide (LASIX) 20 MG tablet     losartan (COZAAR) 100 MG tablet     metFORMIN (GLUCOPHAGE XR) 500 MG 24 hr tablet      potassium chloride ER (KLOR-CON M) 20 MEQ CR tablet     predniSONE (DELTASONE) 10 MG tablet     rivaroxaban ANTICOAGULANT (XARELTO ANTICOAGULANT) 20 MG TABS tablet     sulfamethoxazole-trimethoprim (BACTRIM DS) 800-160 MG tablet     No current facility-administered medications for this visit.     Allergies   Allergen Reactions     Amiodarone Shortness Of Breath     Suspected amiodarone toxicity involving lungs     No Known Drug Allergy      Social History     Socioeconomic History     Marital status: Single     Spouse name: Not on file     Number of children: Not on file     Years of education: Not on file     Highest education level: Not on file   Occupational History     Not on file   Tobacco Use     Smoking status: Former     Packs/day: 1.00     Years: 10.00     Additional pack years: 0.00     Total pack years: 10.00     Types: Cigarettes     Start date:      Quit date: 2011     Years since quittin.3     Smokeless tobacco: Never     Tobacco comments:     quit    Vaping Use     Vaping Use: Never used   Substance and Sexual Activity     Alcohol use: Not Currently     Drug use: Never     Sexual activity: Not Currently     Partners: Female     Birth control/protection: None   Other Topics Concern     Parent/sibling w/ CABG, MI or angioplasty before 65F 55M? Not Asked   Social History Narrative    Worked in retail () and insurance sales     Sp[place] time on [place]     Social Determinants of Health     Financial Resource Strain: Not on file   Food Insecurity: Not on file   Transportation Needs: Not on file   Physical Activity: Not on file   Stress: Not on file   Social Connections: Not on file   Interpersonal Safety: Not on file   Housing Stability: Not on file     Past Medical History:   Diagnosis Date     Arthritis     gout corrected  taking allopurinol     Basal cell carcinoma      Cancer (H)     basal nose     H/O ETOH abuse      Herpes simplex without mention of  complication      Hyperlipidemia      Hypertension 2000     Obesity      Persistent atrial fibrillation (H)     persistent, DCCV 12/2017, ablation 11/6/17     Sleep apnea     CPAP     Tobacco use disorder     dced 6/2004     Past Surgical History:   Procedure Laterality Date     ABLATION OF DYSRHYTHMIC FOCUS  2018, 04/12/2019    Procedure: EP Ablation Focal AFIB;  Surgeon: Fady Day MD;  Location:  HEART CARDIAC CATH LAB     ACHILLES TENDON SURGERY  1997     ANESTHESIA CARDIOVERSION N/A 2/26/2019    Procedure: ANESTHESIA CARDIOVERSION (CONNER);  Surgeon: GENERIC ANESTHESIA PROVIDER;  Location:  OR     ANESTHESIA CARDIOVERSION N/A 11/19/2020    Procedure: ANESTHESIA, FOR CARDIOVERSION (dr campbell);  Surgeon: GENERIC ANESTHESIA PROVIDER;  Location:  OR     ANESTHESIA CARDIOVERSION N/A 8/22/2022    Procedure: CARDIOVERSION;  Surgeon: GENERIC ANESTHESIA PROVIDER;  Location:  OR     ANESTHESIA CARDIOVERSION N/A 9/13/2023    Procedure: Anesthesia cardioversion;  Surgeon: GENERIC ANESTHESIA PROVIDER;  Location:  OR     ANESTHESIA CARDIOVERSION N/A 10/6/2023    Procedure: Anesthesia cardioversion;  Surgeon: GENERIC ANESTHESIA PROVIDER;  Location:  OR     BASAL CELL CARCINOMA EXCISION Right 2009    nose, took cartilage from inner ear.     CARDIOVERSION  2012, 2019    cardioversion for atr fib     COLONOSCOPY  2008     COLONOSCOPY N/A 3/22/2023    Procedure: COLONOSCOPY, FLEXIBLE, WITH LESION REMOVAL USING SNARE;  Surgeon: Anne Lucero MD;  Location:  GI     EP ABLATION FOCAL AFIB N/A 4/12/2019    Procedure: EP Ablation Focal AFIB;  Surgeon: Fady Day MD;  Location:  HEART CARDIAC CATH LAB     HERNIA REPAIR, UMBILICAL  08/20/2012    Procedure: HERNIORRHAPHY UMBILICAL;  UMBILICAL HERNIA REPAIR;  Surgeon: Ra Crocker MD;  Location:  [facility]     HERNIORRHAPHY UMBILICAL  8/20/2012    Procedure: HERNIORRHAPHY UMBILICAL;  UMBILICAL HERNIA REPAIR;  Surgeon: Ra Crocker MD;  Location:   "[facility]     ORTHOPEDIC SURGERY       ORTHOPEDIC SURGERY       OTHER SURGICAL HISTORY Right 2017    total KNEE ARTHROSCOPY      TOTAL KNEE ARTHROPLASTY Left 2018     ZZ NONSPECIFIC PROCEDURE  1997    achilles tendon     ZZC NONSPECIFIC PROCEDURE      knees, arthroscopy     ZZC NONSPECIFIC PROCEDURE      basal cell skin ca, nose     ZZC NONSPECIFIC PROCEDURE      flex sig; colonoscopy due 3/2008     ZZC NONSPECIFIC PROCEDURE      cardioversion for atr fib     ZZC TOTAL KNEE ARTHROPLASTY Left 2018    Dr. Malick Suarez     Family History   Problem Relation Age of Onset     Family History Negative Mother      Heart Disease Father         decesased at 42 - MI     Heart Disease Sister          MI     Lipids Sister      Lipids Sister      No Known Problems Sister      Heart Disease Brother          MI     Heart Disease Brother         CABG AT 49     Lung Cancer No family hx of        Exam:   BP (!) 156/79 (Cuff Size: Adult Large)   Pulse 80   Temp 97  F (36.1  C) (Tympanic)   Resp 18   Ht 1.727 m (5' 8\")   Wt 126.6 kg (279 lb)   SpO2 93%   BMI 42.42 kg/m    GENERAL APPEARANCE: Well developed, well nourished, alert, and in no apparent distress.  LUNGS: right apical posterior lung crackles  PSYCH: mentation appears normal. and affect normal/bright  Results:  - My interpretation of the images relevant for this visit includes: CT chest imaging reviewed Dec 2022, Aug 2023, 2023 multiple nodules in the  base of both lungs, some elongated and irregular, some more round. Largest is about 2x1 cm  - My interpretation of the PFT's relevant for this visit includes: None       Assessment and plan: Brayden is a 74 yo male with incidental lung nodules, atrial fibrillation and recent probable amiodarone pulmonary toxicity.    Lung nodule - seen on chest CT, not seen in , no significant change between Aug and Sept, unusual in appearance, no respiratory symptoms. Difficult to biopsy due to location " plus he is dealing with an atrial fibrillation situation and planning another cardioversion in a few weeks   Considering patient with high risk for lung cancer, recommend follow up CT in  2 months per Flesaranner 2017. Nodify reduced risk (11%)  Amiodarone toxicity - hospitalized with acute respiratory failure, symptoms and imaging and oximetry improved. PFTs borderline normal.   He was prescribed a prolonged steroid taper from the hospital and will continue through March. Repeat CT and PFT in march.       Again, thank you for allowing me to participate in the care of your patient.        Sincerely,        Jeanette Mckeon MD

## 2023-12-28 DIAGNOSIS — I48.19 PERSISTENT ATRIAL FIBRILLATION (H): Primary | ICD-10-CM

## 2024-01-15 ENCOUNTER — LAB (OUTPATIENT)
Dept: LAB | Facility: CLINIC | Age: 74
End: 2024-01-15
Payer: COMMERCIAL

## 2024-01-15 DIAGNOSIS — I48.19 PERSISTENT ATRIAL FIBRILLATION (H): ICD-10-CM

## 2024-01-15 LAB
ANION GAP SERPL CALCULATED.3IONS-SCNC: 12 MMOL/L (ref 7–15)
BUN SERPL-MCNC: 29.3 MG/DL (ref 8–23)
CALCIUM SERPL-MCNC: 9.1 MG/DL (ref 8.8–10.2)
CHLORIDE SERPL-SCNC: 102 MMOL/L (ref 98–107)
CREAT SERPL-MCNC: 1.15 MG/DL (ref 0.67–1.17)
DEPRECATED HCO3 PLAS-SCNC: 25 MMOL/L (ref 22–29)
EGFRCR SERPLBLD CKD-EPI 2021: 68 ML/MIN/1.73M2
GLUCOSE SERPL-MCNC: 98 MG/DL (ref 70–99)
POTASSIUM SERPL-SCNC: 3.9 MMOL/L (ref 3.4–5.3)
SODIUM SERPL-SCNC: 139 MMOL/L (ref 135–145)

## 2024-01-15 PROCEDURE — 80048 BASIC METABOLIC PNL TOTAL CA: CPT | Performed by: PHYSICIAN ASSISTANT

## 2024-01-15 PROCEDURE — 36415 COLL VENOUS BLD VENIPUNCTURE: CPT | Performed by: PHYSICIAN ASSISTANT

## 2024-01-16 ENCOUNTER — MYC MEDICAL ADVICE (OUTPATIENT)
Dept: CARDIOLOGY | Facility: CLINIC | Age: 74
End: 2024-01-16

## 2024-01-16 DIAGNOSIS — I48.19 PERSISTENT ATRIAL FIBRILLATION (H): ICD-10-CM

## 2024-01-17 NOTE — TELEPHONE ENCOUNTER
01/17/24 [facility] Refill Guide Reviewed     Received refill request for:Xarelto 20 mg  Last OV was: 10/26/23  Labs/EKG: UTD  F/U scheduled :02/23/24  Rx sent to:  Pt requests paper copy to be emailed to him. Will have Crystal sign and email to pt address on file     Pt updated vis Jose Wilson RN

## 2024-01-24 NOTE — NURSING NOTE
[unfilled]    Discharge Summary     Patient: Edward Ordonez MRN: [id number]  SSN: [id number]    YOB: 1956  Age: 67 y.o.  Sex: male       Admit Date: 1/23/2024    Discharge Date: 1/24/2024      Admission Diagnoses: Primary osteoarthritis of left knee [M17.12]    Discharge Diagnoses:      Discharge Condition: Good    Hospital Course: Patient underwent left total knee arthroplasty yesterday.  He was able to ambulate down the hallway with assistance.  He will get additional physical therapy today.  It is anticipated that he will be discharged today.    Postoperatively he has had stable appearing postoperative x-rays.  He has been afebrile and vital signs stable.  He has intact pulses in the lower extremities.    Consults: None    Disposition: home    Discharge Medications:   Current Discharge Medication List        START taking these medications    Details   HYDROmorphone (DILAUDID) 2 MG tablet Take 1 tablet by mouth every 6 hours as needed for Pain for up to 5 days. Max Daily Amount: 8 mg  Qty: 20 tablet, Refills: 0    Comments: Reduce doses taken as pain becomes manageable  Associated Diagnoses: Primary osteoarthritis of left knee      zolpidem (AMBIEN) 5 MG tablet Take 1 tablet by mouth nightly as needed for Sleep for up to 14 days. Max Daily Amount: 5 mg  Qty: 14 tablet, Refills: 0    Associated Diagnoses: Primary osteoarthritis of left knee           CONTINUE these medications which have NOT CHANGED    Details   aspirin 81 MG chewable tablet Take 1 tablet by mouth 2 times daily  Qty: 30 tablet, Refills: 3      ibuprofen (ADVIL;MOTRIN) 800 MG tablet Take 1 tablet by mouth every 6 hours as needed for Pain  Qty: 120 tablet, Refills: 3    Associated Diagnoses: Primary osteoarthritis of both knees      losartan (COZAAR) 100 MG tablet Take 1 tablet by mouth daily  Qty: 90 tablet, Refills: 1    Associated Diagnoses: Essential hypertension           STOP taking these medications        Brayden to follow up with Primary Care provider regarding elevated blood pressure.

## 2024-02-04 ENCOUNTER — TRANSFERRED RECORDS (OUTPATIENT)
Dept: MULTI SPECIALTY CLINIC | Facility: CLINIC | Age: 74
End: 2024-02-04

## 2024-02-04 LAB — RETINOPATHY: NORMAL

## 2024-02-06 ENCOUNTER — MYC MEDICAL ADVICE (OUTPATIENT)
Dept: CARDIOLOGY | Facility: CLINIC | Age: 74
End: 2024-02-06
Payer: COMMERCIAL

## 2024-02-06 DIAGNOSIS — E87.6 HYPOKALEMIA: ICD-10-CM

## 2024-02-06 DIAGNOSIS — I10 BENIGN ESSENTIAL HYPERTENSION: Primary | ICD-10-CM

## 2024-02-06 DIAGNOSIS — R60.0 LOWER EXTREMITY EDEMA: ICD-10-CM

## 2024-02-06 NOTE — TELEPHONE ENCOUNTER
When he writes back, pls send in Rx for spironolactone (I have Rx otoniel'd up) and set him up for BMP in 1 week and again when I see him 2/23.  I've ordered both.    Pls also change KCl dosing in EPIC if he agrees.      Thx!  Crystal

## 2024-02-07 RX ORDER — SPIRONOLACTONE 25 MG/1
12.5 TABLET ORAL DAILY
Qty: 45 TABLET | Refills: 1 | Status: ON HOLD | OUTPATIENT
Start: 2024-02-07 | End: 2024-02-13

## 2024-02-07 RX ORDER — POTASSIUM CHLORIDE 1500 MG/1
20 TABLET, EXTENDED RELEASE ORAL DAILY
Qty: 90 TABLET | Refills: 1 | Status: SHIPPED | OUTPATIENT
Start: 2024-02-07 | End: 2024-02-23

## 2024-02-09 NOTE — TELEPHONE ENCOUNTER
Spoke to pt about his LE edema. Pt had not weighed himself for for a couple weeks. Did say that las week he was about 279, which is noted in Epic. Pt weighed him self while on the phone with this writer and said that his weight was 292. Pt did state that he has voided more today after staring the Spironolactone.  Discussed a plan that pt continue to take his weight daily and if his weight continues to go up and he does not feel well that he is go to the ER, but if his weight goes down, he will give an update on Monday. Nicolette

## 2024-02-11 ENCOUNTER — APPOINTMENT (OUTPATIENT)
Dept: GENERAL RADIOLOGY | Facility: CLINIC | Age: 74
DRG: 292 | End: 2024-02-11
Attending: EMERGENCY MEDICINE
Payer: COMMERCIAL

## 2024-02-11 ENCOUNTER — HOSPITAL ENCOUNTER (INPATIENT)
Facility: CLINIC | Age: 74
LOS: 2 days | Discharge: HOME | DRG: 292 | End: 2024-02-13
Attending: EMERGENCY MEDICINE | Admitting: HOSPITALIST
Payer: COMMERCIAL

## 2024-02-11 ENCOUNTER — APPOINTMENT (OUTPATIENT)
Dept: OCCUPATIONAL THERAPY | Facility: CLINIC | Age: 74
DRG: 292 | End: 2024-02-11
Attending: HOSPITALIST
Payer: COMMERCIAL

## 2024-02-11 ENCOUNTER — APPOINTMENT (OUTPATIENT)
Dept: CARDIOLOGY | Facility: CLINIC | Age: 74
DRG: 292 | End: 2024-02-11
Attending: HOSPITALIST
Payer: COMMERCIAL

## 2024-02-11 DIAGNOSIS — I48.20 CHRONIC ATRIAL FIBRILLATION (H): ICD-10-CM

## 2024-02-11 DIAGNOSIS — I50.9 ACUTE ON CHRONIC CONGESTIVE HEART FAILURE, UNSPECIFIED HEART FAILURE TYPE (H): ICD-10-CM

## 2024-02-11 LAB
ALBUMIN SERPL BCG-MCNC: 3.9 G/DL (ref 3.5–5.2)
ALP SERPL-CCNC: 53 U/L (ref 40–150)
ALT SERPL W P-5'-P-CCNC: 37 U/L (ref 0–70)
ANION GAP SERPL CALCULATED.3IONS-SCNC: 15 MMOL/L (ref 7–15)
ANION GAP SERPL CALCULATED.3IONS-SCNC: 9 MMOL/L (ref 7–15)
AST SERPL W P-5'-P-CCNC: 28 U/L (ref 0–45)
ATRIAL RATE - MUSE: NORMAL BPM
BASOPHILS # BLD AUTO: 0 10E3/UL (ref 0–0.2)
BASOPHILS # BLD AUTO: 0.1 10E3/UL (ref 0–0.2)
BASOPHILS NFR BLD AUTO: 0 %
BASOPHILS NFR BLD AUTO: 0 %
BILIRUB SERPL-MCNC: 0.3 MG/DL
BUN SERPL-MCNC: 26.1 MG/DL (ref 8–23)
BUN SERPL-MCNC: 28.5 MG/DL (ref 8–23)
CALCIUM SERPL-MCNC: 8.7 MG/DL (ref 8.8–10.2)
CALCIUM SERPL-MCNC: 9 MG/DL (ref 8.8–10.2)
CHLORIDE SERPL-SCNC: 100 MMOL/L (ref 98–107)
CHLORIDE SERPL-SCNC: 99 MMOL/L (ref 98–107)
CREAT SERPL-MCNC: 1.17 MG/DL (ref 0.67–1.17)
CREAT SERPL-MCNC: 1.25 MG/DL (ref 0.67–1.17)
DEPRECATED HCO3 PLAS-SCNC: 25 MMOL/L (ref 22–29)
DEPRECATED HCO3 PLAS-SCNC: 30 MMOL/L (ref 22–29)
DIASTOLIC BLOOD PRESSURE - MUSE: NORMAL MMHG
EGFRCR SERPLBLD CKD-EPI 2021: 61 ML/MIN/1.73M2
EGFRCR SERPLBLD CKD-EPI 2021: 66 ML/MIN/1.73M2
EOSINOPHIL # BLD AUTO: 0.1 10E3/UL (ref 0–0.7)
EOSINOPHIL # BLD AUTO: 0.2 10E3/UL (ref 0–0.7)
EOSINOPHIL NFR BLD AUTO: 1 %
EOSINOPHIL NFR BLD AUTO: 1 %
ERYTHROCYTE [DISTWIDTH] IN BLOOD BY AUTOMATED COUNT: 14.7 % (ref 10–15)
ERYTHROCYTE [DISTWIDTH] IN BLOOD BY AUTOMATED COUNT: 14.8 % (ref 10–15)
GLUCOSE SERPL-MCNC: 103 MG/DL (ref 70–99)
GLUCOSE SERPL-MCNC: 112 MG/DL (ref 70–99)
HCT VFR BLD AUTO: 40.8 % (ref 40–53)
HCT VFR BLD AUTO: 41.2 % (ref 40–53)
HGB BLD-MCNC: 13.4 G/DL (ref 13.3–17.7)
HGB BLD-MCNC: 13.5 G/DL (ref 13.3–17.7)
IMM GRANULOCYTES # BLD: 0.1 10E3/UL
IMM GRANULOCYTES # BLD: 0.2 10E3/UL
IMM GRANULOCYTES NFR BLD: 1 %
IMM GRANULOCYTES NFR BLD: 1 %
INTERPRETATION ECG - MUSE: NORMAL
LVEF ECHO: NORMAL
LYMPHOCYTES # BLD AUTO: 3.2 10E3/UL (ref 0.8–5.3)
LYMPHOCYTES # BLD AUTO: 4.4 10E3/UL (ref 0.8–5.3)
LYMPHOCYTES NFR BLD AUTO: 24 %
LYMPHOCYTES NFR BLD AUTO: 35 %
MAGNESIUM SERPL-MCNC: 1.7 MG/DL (ref 1.7–2.3)
MCH RBC QN AUTO: 32.5 PG (ref 26.5–33)
MCH RBC QN AUTO: 32.7 PG (ref 26.5–33)
MCHC RBC AUTO-ENTMCNC: 32.8 G/DL (ref 31.5–36.5)
MCHC RBC AUTO-ENTMCNC: 32.8 G/DL (ref 31.5–36.5)
MCV RBC AUTO: 100 FL (ref 78–100)
MCV RBC AUTO: 99 FL (ref 78–100)
MONOCYTES # BLD AUTO: 1.2 10E3/UL (ref 0–1.3)
MONOCYTES # BLD AUTO: 1.4 10E3/UL (ref 0–1.3)
MONOCYTES NFR BLD AUTO: 10 %
MONOCYTES NFR BLD AUTO: 10 %
NEUTROPHILS # BLD AUTO: 6.7 10E3/UL (ref 1.6–8.3)
NEUTROPHILS # BLD AUTO: 8.5 10E3/UL (ref 1.6–8.3)
NEUTROPHILS NFR BLD AUTO: 53 %
NEUTROPHILS NFR BLD AUTO: 64 %
NRBC # BLD AUTO: 0 10E3/UL
NRBC # BLD AUTO: 0 10E3/UL
NRBC BLD AUTO-RTO: 0 /100
NRBC BLD AUTO-RTO: 0 /100
NT-PROBNP SERPL-MCNC: 442 PG/ML (ref 0–900)
P AXIS - MUSE: NORMAL DEGREES
PLATELET # BLD AUTO: 227 10E3/UL (ref 150–450)
PLATELET # BLD AUTO: 240 10E3/UL (ref 150–450)
POTASSIUM SERPL-SCNC: 3.7 MMOL/L (ref 3.4–5.3)
POTASSIUM SERPL-SCNC: 3.7 MMOL/L (ref 3.4–5.3)
PR INTERVAL - MUSE: NORMAL MS
PROT SERPL-MCNC: 6.5 G/DL (ref 6.4–8.3)
QRS DURATION - MUSE: 90 MS
QT - MUSE: 368 MS
QTC - MUSE: 462 MS
R AXIS - MUSE: 27 DEGREES
RBC # BLD AUTO: 4.1 10E6/UL (ref 4.4–5.9)
RBC # BLD AUTO: 4.15 10E6/UL (ref 4.4–5.9)
SODIUM SERPL-SCNC: 139 MMOL/L (ref 135–145)
SODIUM SERPL-SCNC: 139 MMOL/L (ref 135–145)
SYSTOLIC BLOOD PRESSURE - MUSE: NORMAL MMHG
T AXIS - MUSE: -1 DEGREES
TROPONIN T SERPL HS-MCNC: 57 NG/L
TROPONIN T SERPL HS-MCNC: 59 NG/L
VENTRICULAR RATE- MUSE: 95 BPM
WBC # BLD AUTO: 12.7 10E3/UL (ref 4–11)
WBC # BLD AUTO: 13.4 10E3/UL (ref 4–11)

## 2024-02-11 PROCEDURE — 84484 ASSAY OF TROPONIN QUANT: CPT | Performed by: EMERGENCY MEDICINE

## 2024-02-11 PROCEDURE — 85025 COMPLETE CBC W/AUTO DIFF WBC: CPT | Performed by: EMERGENCY MEDICINE

## 2024-02-11 PROCEDURE — 97110 THERAPEUTIC EXERCISES: CPT | Mod: GO

## 2024-02-11 PROCEDURE — 93005 ELECTROCARDIOGRAM TRACING: CPT

## 2024-02-11 PROCEDURE — 93306 TTE W/DOPPLER COMPLETE: CPT | Mod: 26 | Performed by: INTERNAL MEDICINE

## 2024-02-11 PROCEDURE — 99222 1ST HOSP IP/OBS MODERATE 55: CPT | Mod: 25 | Performed by: INTERNAL MEDICINE

## 2024-02-11 PROCEDURE — 85025 COMPLETE CBC W/AUTO DIFF WBC: CPT | Performed by: HOSPITALIST

## 2024-02-11 PROCEDURE — 999N000208 ECHOCARDIOGRAM COMPLETE

## 2024-02-11 PROCEDURE — 120N000001 HC R&B MED SURG/OB

## 2024-02-11 PROCEDURE — 97165 OT EVAL LOW COMPLEX 30 MIN: CPT | Mod: GO

## 2024-02-11 PROCEDURE — 97535 SELF CARE MNGMENT TRAINING: CPT | Mod: GO

## 2024-02-11 PROCEDURE — 83880 ASSAY OF NATRIURETIC PEPTIDE: CPT | Performed by: EMERGENCY MEDICINE

## 2024-02-11 PROCEDURE — 96374 THER/PROPH/DIAG INJ IV PUSH: CPT

## 2024-02-11 PROCEDURE — 80053 COMPREHEN METABOLIC PANEL: CPT | Performed by: EMERGENCY MEDICINE

## 2024-02-11 PROCEDURE — 250N000011 HC RX IP 250 OP 636: Performed by: HOSPITALIST

## 2024-02-11 PROCEDURE — 71046 X-RAY EXAM CHEST 2 VIEWS: CPT

## 2024-02-11 PROCEDURE — 99285 EMERGENCY DEPT VISIT HI MDM: CPT | Mod: 25

## 2024-02-11 PROCEDURE — 36415 COLL VENOUS BLD VENIPUNCTURE: CPT | Performed by: HOSPITALIST

## 2024-02-11 PROCEDURE — 99223 1ST HOSP IP/OBS HIGH 75: CPT | Performed by: HOSPITALIST

## 2024-02-11 PROCEDURE — 36415 COLL VENOUS BLD VENIPUNCTURE: CPT | Performed by: EMERGENCY MEDICINE

## 2024-02-11 PROCEDURE — 250N000011 HC RX IP 250 OP 636: Performed by: EMERGENCY MEDICINE

## 2024-02-11 PROCEDURE — 255N000002 HC RX 255 OP 636: Performed by: INTERNAL MEDICINE

## 2024-02-11 PROCEDURE — 250N000013 HC RX MED GY IP 250 OP 250 PS 637: Performed by: INTERNAL MEDICINE

## 2024-02-11 PROCEDURE — 83735 ASSAY OF MAGNESIUM: CPT | Performed by: HOSPITALIST

## 2024-02-11 PROCEDURE — 250N000012 HC RX MED GY IP 250 OP 636 PS 637: Performed by: INTERNAL MEDICINE

## 2024-02-11 RX ORDER — ONDANSETRON 4 MG/1
4 TABLET, ORALLY DISINTEGRATING ORAL EVERY 6 HOURS PRN
Status: DISCONTINUED | OUTPATIENT
Start: 2024-02-11 | End: 2024-02-13 | Stop reason: HOSPADM

## 2024-02-11 RX ORDER — LOSARTAN POTASSIUM 100 MG/1
100 TABLET ORAL DAILY
Status: DISCONTINUED | OUTPATIENT
Start: 2024-02-11 | End: 2024-02-13 | Stop reason: HOSPADM

## 2024-02-11 RX ORDER — FUROSEMIDE 10 MG/ML
60 INJECTION INTRAMUSCULAR; INTRAVENOUS EVERY 8 HOURS
Status: DISCONTINUED | OUTPATIENT
Start: 2024-02-11 | End: 2024-02-13

## 2024-02-11 RX ORDER — IBUPROFEN 200 MG
2 TABLET ORAL EVERY 6 HOURS PRN
Status: ON HOLD | COMMUNITY
End: 2024-02-13

## 2024-02-11 RX ORDER — LANOLIN ALCOHOL/MO/W.PET/CERES
3 CREAM (GRAM) TOPICAL
Status: DISCONTINUED | OUTPATIENT
Start: 2024-02-11 | End: 2024-02-13 | Stop reason: HOSPADM

## 2024-02-11 RX ORDER — METOPROLOL TARTRATE 1 MG/ML
5 INJECTION, SOLUTION INTRAVENOUS EVERY 4 HOURS PRN
Status: DISCONTINUED | OUTPATIENT
Start: 2024-02-11 | End: 2024-02-13 | Stop reason: HOSPADM

## 2024-02-11 RX ORDER — ACETAMINOPHEN 650 MG/1
650 SUPPOSITORY RECTAL EVERY 4 HOURS PRN
Status: DISCONTINUED | OUTPATIENT
Start: 2024-02-11 | End: 2024-02-13 | Stop reason: HOSPADM

## 2024-02-11 RX ORDER — FINASTERIDE 1 MG/1
1 TABLET, FILM COATED ORAL DAILY
Status: DISCONTINUED | OUTPATIENT
Start: 2024-02-11 | End: 2024-02-13 | Stop reason: HOSPADM

## 2024-02-11 RX ORDER — SULFAMETHOXAZOLE/TRIMETHOPRIM 800-160 MG
1 TABLET ORAL DAILY
Status: DISCONTINUED | OUTPATIENT
Start: 2024-02-11 | End: 2024-02-13 | Stop reason: HOSPADM

## 2024-02-11 RX ORDER — FUROSEMIDE 10 MG/ML
60 INJECTION INTRAMUSCULAR; INTRAVENOUS ONCE
Status: COMPLETED | OUTPATIENT
Start: 2024-02-11 | End: 2024-02-11

## 2024-02-11 RX ORDER — ALLOPURINOL 300 MG/1
300 TABLET ORAL DAILY
Status: DISCONTINUED | OUTPATIENT
Start: 2024-02-11 | End: 2024-02-13 | Stop reason: HOSPADM

## 2024-02-11 RX ORDER — AMOXICILLIN 250 MG
2 CAPSULE ORAL 2 TIMES DAILY PRN
Status: DISCONTINUED | OUTPATIENT
Start: 2024-02-11 | End: 2024-02-13 | Stop reason: HOSPADM

## 2024-02-11 RX ORDER — AMOXICILLIN 250 MG
1 CAPSULE ORAL 2 TIMES DAILY PRN
Status: DISCONTINUED | OUTPATIENT
Start: 2024-02-11 | End: 2024-02-13 | Stop reason: HOSPADM

## 2024-02-11 RX ORDER — MAGNESIUM OXIDE 400 MG/1
400 TABLET ORAL EVERY 4 HOURS
Status: COMPLETED | OUTPATIENT
Start: 2024-02-11 | End: 2024-02-11

## 2024-02-11 RX ORDER — CALCIUM CARBONATE 500 MG/1
1000 TABLET, CHEWABLE ORAL 4 TIMES DAILY PRN
Status: DISCONTINUED | OUTPATIENT
Start: 2024-02-11 | End: 2024-02-13 | Stop reason: HOSPADM

## 2024-02-11 RX ORDER — AMLODIPINE BESYLATE 5 MG/1
5 TABLET ORAL DAILY
Status: DISCONTINUED | OUTPATIENT
Start: 2024-02-11 | End: 2024-02-13 | Stop reason: HOSPADM

## 2024-02-11 RX ORDER — ONDANSETRON 2 MG/ML
4 INJECTION INTRAMUSCULAR; INTRAVENOUS EVERY 6 HOURS PRN
Status: DISCONTINUED | OUTPATIENT
Start: 2024-02-11 | End: 2024-02-13 | Stop reason: HOSPADM

## 2024-02-11 RX ORDER — POTASSIUM CHLORIDE 1500 MG/1
20 TABLET, EXTENDED RELEASE ORAL DAILY
Status: DISCONTINUED | OUTPATIENT
Start: 2024-02-11 | End: 2024-02-13 | Stop reason: HOSPADM

## 2024-02-11 RX ORDER — LIDOCAINE 40 MG/G
CREAM TOPICAL
Status: DISCONTINUED | OUTPATIENT
Start: 2024-02-11 | End: 2024-02-13 | Stop reason: HOSPADM

## 2024-02-11 RX ORDER — PREDNISONE 20 MG/1
20 TABLET ORAL DAILY
Status: DISCONTINUED | OUTPATIENT
Start: 2024-02-11 | End: 2024-02-13 | Stop reason: HOSPADM

## 2024-02-11 RX ORDER — GUAIFENESIN/DEXTROMETHORPHAN 100-10MG/5
10 SYRUP ORAL EVERY 4 HOURS PRN
Status: DISCONTINUED | OUTPATIENT
Start: 2024-02-11 | End: 2024-02-13 | Stop reason: HOSPADM

## 2024-02-11 RX ORDER — ACETAMINOPHEN 325 MG/1
650 TABLET ORAL EVERY 4 HOURS PRN
Status: DISCONTINUED | OUTPATIENT
Start: 2024-02-11 | End: 2024-02-13 | Stop reason: HOSPADM

## 2024-02-11 RX ADMIN — HUMAN ALBUMIN MICROSPHERES AND PERFLUTREN 9 ML: 10; .22 INJECTION, SOLUTION INTRAVENOUS at 09:52

## 2024-02-11 RX ADMIN — Medication 400 MG: at 12:40

## 2024-02-11 RX ADMIN — RIVAROXABAN 20 MG: 20 TABLET, FILM COATED ORAL at 16:32

## 2024-02-11 RX ADMIN — PREDNISONE 20 MG: 20 TABLET ORAL at 16:32

## 2024-02-11 RX ADMIN — FUROSEMIDE 60 MG: 10 INJECTION, SOLUTION INTRAMUSCULAR; INTRAVENOUS at 03:36

## 2024-02-11 RX ADMIN — FUROSEMIDE 60 MG: 10 INJECTION, SOLUTION INTRAMUSCULAR; INTRAVENOUS at 12:39

## 2024-02-11 RX ADMIN — Medication 400 MG: at 10:30

## 2024-02-11 RX ADMIN — ALLOPURINOL 300 MG: 300 TABLET ORAL at 16:33

## 2024-02-11 RX ADMIN — LOSARTAN POTASSIUM 100 MG: 100 TABLET, FILM COATED ORAL at 16:32

## 2024-02-11 RX ADMIN — FINASTERIDE 1 MG: 1 TABLET, FILM COATED ORAL at 16:32

## 2024-02-11 RX ADMIN — AMLODIPINE BESYLATE 5 MG: 5 TABLET ORAL at 16:32

## 2024-02-11 RX ADMIN — POTASSIUM CHLORIDE 20 MEQ: 1500 TABLET, EXTENDED RELEASE ORAL at 16:32

## 2024-02-11 RX ADMIN — FUROSEMIDE 60 MG: 10 INJECTION, SOLUTION INTRAMUSCULAR; INTRAVENOUS at 21:12

## 2024-02-11 RX ADMIN — SULFAMETHOXAZOLE AND TRIMETHOPRIM 1 TABLET: 800; 160 TABLET ORAL at 16:33

## 2024-02-11 ASSESSMENT — ACTIVITIES OF DAILY LIVING (ADL)
ADLS_ACUITY_SCORE: 37
ADLS_ACUITY_SCORE: 37
ADLS_ACUITY_SCORE: 20
IADL_COMMENTS: PT REPORTS BEING IND W/ ALL IADL'S AT BASELINE PRIOR TO ADMISSION. PT REPORTS THAT THEY STILL DRIVE.
ADLS_ACUITY_SCORE: 20
PREVIOUS_RESPONSIBILITIES: MEAL PREP;HOUSEKEEPING;LAUNDRY;MEDICATION MANAGEMENT;FINANCES;DRIVING
ADLS_ACUITY_SCORE: 20

## 2024-02-11 NOTE — ED TRIAGE NOTES
Pt report he has hx of a fib and has had increased weight gain the past week greater than 10 lbs     This has also caused pt to be sob 91% on RA

## 2024-02-11 NOTE — PHARMACY-ADMISSION MEDICATION HISTORY
Pharmacy Intern Admission Medication History    Admission medication history is complete. The information provided in this note is only as accurate as the sources available at the time of the update.    Information Source(s): Patient via in-person    Pertinent Information: Updated med list and last doses per patient's testimony.     Patient reports he is currently on Prednisone taper. Current dose is 2 tablets every day, started this about a week ago.     Changes made to PTA medication list:  Added: Ibuprofen  Deleted: None  Changed: None    Allergies reviewed with patient and updates made in EHR: yes    Medication History Completed By: Tristan Suarez 2/11/2024 9:27 AM    PTA Med List   Medication Sig Last Dose    allopurinol (ZYLOPRIM) 300 MG tablet Take 1 tablet (300 mg) by mouth daily 2/10/2024 at am    amLODIPine (NORVASC) 5 MG tablet Take 1 tablet (5 mg) by mouth daily 2/10/2024 at am    cetirizine (ZYRTEC) 10 MG tablet Take 10 mg by mouth as needed for allergies  at prn    finasteride (PROPECIA) 1 MG tablet Take 1 tablet (1 mg) by mouth daily 2/10/2024 at am    furosemide (LASIX) 20 MG tablet Take 2 tablets (40 mg) by mouth daily 2/10/2024 at am    ibuprofen (ADVIL/MOTRIN) 200 MG tablet Take 2 tablets by mouth every 6 hours as needed for pain  at prn    losartan (COZAAR) 100 MG tablet Take 1 tablet (100 mg) by mouth daily 2/10/2024 at am    metFORMIN (GLUCOPHAGE XR) 500 MG 24 hr tablet Take 1 tablet (500 mg) by mouth daily (with dinner) 2/10/2024 at pm    potassium chloride ER (KLOR-CON M) 20 MEQ CR tablet Take 1 tablet (20 mEq) by mouth daily 2/10/2024 at am    predniSONE (DELTASONE) 10 MG tablet Take 6 tablets (60 mg) by mouth daily for 10 days, THEN 5 tablets (50 mg) daily for 14 days, THEN 4 tablets (40 mg) daily for 14 days, THEN 3 tablets (30 mg) daily for 14 days, THEN 3 tablets (30 mg) daily for 14 days, THEN 2 tablets (20 mg) daily for 14 days, THEN 1 tablet (10 mg) daily for 14 days, THEN 1 tablet  (10 mg) every 48 hours for 14 days. 2/10/2024 at am    rivaroxaban ANTICOAGULANT (XARELTO ANTICOAGULANT) 20 MG TABS tablet Take 1 tablet (20 mg) by mouth daily (with dinner) 2/10/2024 at pm    spironolactone (ALDACTONE) 25 MG tablet Take 0.5 tablets (12.5 mg) by mouth daily 2/10/2024 at am    sulfamethoxazole-trimethoprim (BACTRIM DS) 800-160 MG tablet Take 1 tablet by mouth daily 2/10/2024 at am

## 2024-02-11 NOTE — H&P
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    PLAN     Acute diastolic CHF exacerbation and recurrent atrial fibrillation with RVR: Of note, Mr. Schultz is followed closely by [facility] for chronic paroxysmal atrial fibrillation and chronic HFPEF. It seems that afib was diagnosed in 2012. Flecanide was trialed until it had to be stopped for treatment failure (also a cardiac MRI noted a scar). He underwent PVI and SVC isolation in 11/2017. Repeat PVI and CTI were done for inducible Atrial Flutter in 4/2019. He underwent DCCV on 11/19/2020 for recurrent AFib, at that time in the setting of alcohol use.  From that time Amiodarone was used intermittently until recently. He had repeat DCCV in 8/22/2022, again (unsuccessfully) he had DCCV on 9/13/23, and was re-loaded with Amiodarone at that time, and then he underwent successful DCCV on 10/6/2023.      Most recently he was subsequently admitted here from 11/25/23 through 12/1/2023 for dyspnea, and found on Chest CT to have findings of widespread pneumonitis, consistent with amiodarone-induced lung toxicity. Therefore that was stopped. Limited TTE showed slight improvement in LV function compared with TTE from 9/2023. Bubble study was negative. Pulmonology and Cardiology were consulted. 60 mg Prednisone was started with prolonged taper  over 3 months ordered. That was continued at discharge. In follow up with Pulmonology 12/18/23, PFT's showed mild restriction. Repeat CT showed substantial improvement in his pneumonitis. Zio patch was negative for recurrent afib. It has been recommended to consider repeat AFib ablation if it recurs in the absence of ETOH use.    Now, he presents for recurrent exertional dyspnea associated with progressive anasarca and reported weight gain over 20 pounds in the past two weeks. These symptoms were preceded by recurrent atrial fibrillation detected at home on his watch. In the ED, he is afebrile. He is not hypoxic at rest. He has accelerated hypertension and tachycardia confirmed to be afib with RVR by EKG. He has elevated Troponin. Pro-BNP is low. He has ongoing leukocytosis. CXR shows a stable appearing borderline enlarged heart with moderate pulmonary vascular congestion and interstitial edema, as well as trace pleural effusions.     Overall, his symptoms are consistent with acute diastolic CHF exacerbation perhaps induced by afib with RVR, as has reportedly often happened in the past. Alternatively it could be triggered by accelerated hypertension. We will rule out ACS. PE seems less likely given ongoing DOAC use. There are no signs at present of acute infection. His RVR has been intermittent in the ED and currently rates are back to 80 without acute intervention.       -- Inpatient. Telemetry, serial enzymes, TTE ordered. Cardiology consulted.     -- He is on Lasix 40 mg daily and recently started Spironolactone. Diurese with 60 mg IV lasix TID. Resume Spironolactone as per cardiology guidance (while here could consider Metolazone instead if needed). Strict I and O's, daily weights, and low sodium diet ordered.    -- Monitor oxygenation carefully while hospitalized    -- Repeat CBC and BMP in AM. SW consulted for disposition planning.    Recurrent afib with RVR: Will order PRN IV Metoprolol for now in case of  "recurrent, sustained RVR. Resume Xarelto when verified    Accelerated hypertension: Resume home Cozaar, amlodipine when verified.    Ongoing leukocytosis due to Prednisone taper: For Prednisone induced pneumonitis. Currently down to 2 mg PO daily; resume when verified    Pulmonary nodules: Also assessed by recent CT imaging and found to have improved in addition to the pneumonitis. Follow up with  Pulmonology as an outpatient as scheduled    BOWEN: Adherent to CPAP, continued    Gout: Resume home Allopurinol when verified. Monitor for flares while aggressively diuresing    Estimated body mass index is 44.85 kg/m  as calculated from the following:    Height as of 12/18/23: 1.727 m (5' 8\").    Weight as of this encounter: 133.8 kg (295 lb).    I have spent 80 minutes on the date of service doing chart review, history, examination, documentation, and further activities per the note.    Chief Complaint   Dyspnea    History is obtained from the patient and the ED physician whom I have spoken with    History of Present Illness   Mike Schultz is a markedly pleasant 73 year old gentleman who presents with dyspnea. He has had chronic dyspnea, at rest and exacerbated by exertion, for several months recently and in the past. Often when it has happened in the past it has been found to have been triggered by atrial fibrillation. He underwent successful cardioversion for that on 10/2023, but that time he says that the dyspnea persisted afterward. Eventually he was hospitalized here in 11/2023 and found to have amiodarone induced lung toxicity. He started a long Prednisone taper. He has been doing that since discharge 12/1/23. He followed up with a Pulmonologist on 12/18/23, and soon after that he says his dyspnea fully resolved for a time. He was able to exercise more and his weight was lower. He has tapered the Prednisone down to 2 mg per day. However, he says that about two weeks ago, his watch informed him that his heart " has now gone back into atrial fibrillation. He denies chest pain or palpitations or dizziness; he says he really never gets those symptoms when in afib. Then several days after that, he started to develop recurrent dyspnea when he walks, associated with progressively severe bilateral lower extremity edema, distension of the abdomen, and weight gain (he says his weight went up over 20 pounds, from previous 270 now to around 290 or higher). He called in and discussed these findings with his EP team and Spironolactone was added to his Lasix and he has been taking both of those now for the past several days, but his symptoms have worsened further, and so he has come in for further evaluation. He otherwise denies any chest pain, or fever, chills, sweats, cough, orthopnea, PND, or any other acute complaints.    In the ED,   02/11 0135 152/85  96.9 103 22 91 %     CBC and CMP were notable for WBC 13.4, BUN 28.5, Cr 1.25, Glucose 112, otherwise were within the normal reference range. Pro BNP was 442. Troponin T was 57.EKG shows afib with rate 95.    He was given 60 mg IV Lasix in the ED.    Recent Results (from the past 24 hour(s))   XR Chest 2 Views    Narrative    EXAM: XR CHEST 2 VIEWS  LOCATION: [facility]  DATE: 2/11/2024    INDICATION: shortness of breath  COMPARISON: 11/25/2023      Impression    IMPRESSION: Stable borderline enlarged heart with moderate pulmonary vascular congestion and interstitial edema. Superimposed infectious infiltrates not entirely excluded and continued attention on follow-up recommended. Probable trace pleural effusions.   No pneumothorax. Acromioclavicular joint and thoracic spine degenerative changes.       PHYSICAL EXAM  Blood pressure (!) 152/85, pulse 82, temperature 96.9  F (36.1  C), temperature source Temporal, resp. rate 13, weight 133.8 kg (295 lb), SpO2 94%.  Constitutional: Alert and oriented to person, place and time; no apparent distress; morbid  obesity  Respiratory: lungs diminished to auscultation bilaterally  Cardiovascular: Irregular S1 S2  GI: abdomen soft non tender non distended bowel sounds positive  Musculoskeletal: severe bilateral LE edema  Neurologic: extra-ocular muscles intact; moves all four extremities  Psychiatric: appropriate affect, insight and judgment     DVT Prophylaxis: DOAC  Code Status: Full Code    Disposition: Expected discharge in 3-5 days    Jose Luis Sosa MD, MD    Past Medical History    I have reviewed this patient's medical history and updated it with pertinent information if needed.   Past Medical History:   Diagnosis Date    (HFpEF) heart failure with preserved ejection fraction (H)     Acute gouty arthritis     Alcohol use disorder in remission     Amiodarone pulmonary toxicity     Aortic stenosis     Basal cell carcinoma     Of the nose    Bell's palsy     Herpes simplex without mention of complication     Hyperlipidemia     Hypertension 2000    Persistent atrial fibrillation (H)     persistent, DCCV 12/2017, ablation 11/6/17    Pulmonary nodules     Sleep apnea     CPAP       Past Surgical History   I have reviewed this patient's surgical history and updated it with pertinent information if needed.  Past Surgical History:   Procedure Laterality Date    ABLATION OF DYSRHYTHMIC FOCUS  2018, 04/12/2019    Procedure: EP Ablation Focal AFIB;  Surgeon: Fady Day MD;  Location:  [facility]    ACHILLES TENDON SURGERY  1997    ANESTHESIA CARDIOVERSION N/A 2/26/2019    Procedure: ANESTHESIA CARDIOVERSION (CONNER);  Surgeon: GENERIC ANESTHESIA PROVIDER;  Location:  OR    ANESTHESIA CARDIOVERSION N/A 11/19/2020    Procedure: ANESTHESIA, FOR CARDIOVERSION (dr campbell);  Surgeon: GENERIC ANESTHESIA PROVIDER;  Location:  OR    ANESTHESIA CARDIOVERSION N/A 8/22/2022    Procedure: CARDIOVERSION;  Surgeon: GENERIC ANESTHESIA PROVIDER;  Location:  OR    ANESTHESIA CARDIOVERSION N/A 9/13/2023    Procedure:  Anesthesia cardioversion;  Surgeon: GENERIC ANESTHESIA PROVIDER;  Location:  OR    ANESTHESIA CARDIOVERSION N/A 10/6/2023    Procedure: Anesthesia cardioversion;  Surgeon: GENERIC ANESTHESIA PROVIDER;  Location:  OR    BASAL CELL CARCINOMA EXCISION Right 2009    nose, took cartilage from inner ear.    CARDIOVERSION  2012, 2019    cardioversion for atr fib    COLONOSCOPY  2008    COLONOSCOPY N/A 3/22/2023    Procedure: COLONOSCOPY, FLEXIBLE, WITH LESION REMOVAL USING SNARE;  Surgeon: Anne Lucero MD;  Location:  GI    EP ABLATION FOCAL AFIB N/A 4/12/2019    Procedure: EP Ablation Focal AFIB;  Surgeon: Fady Day MD;  Location:  [facility]ATH [facility]    HERNIA REPAIR, UMBILICAL  08/20/2012    Procedure: HERNIORRHAPHY UMBILICAL;  UMBILICAL HERNIA REPAIR;  Surgeon: Ra Crocker MD;  Location: [facility]    HERNIORRHAPHY UMBILICAL  8/20/2012    Procedure: HERNIORRHAPHY UMBILICAL;  UMBILICAL HERNIA REPAIR;  Surgeon: Ra Crocker MD;  Location: [facility]    ORTHOPEDIC SURGERY      ORTHOPEDIC SURGERY      OTHER SURGICAL HISTORY Right 03/2017    total KNEE ARTHROSCOPY     TOTAL KNEE ARTHROPLASTY Left 03/16/2018    ZZC NONSPECIFIC PROCEDURE  1997    achilles tendon    ZZC NONSPECIFIC PROCEDURE      knees, arthroscopy    ZZC NONSPECIFIC PROCEDURE      basal cell skin ca, nose    ZZC NONSPECIFIC PROCEDURE      flex sig; colonoscopy due 3/2008    ZZC NONSPECIFIC PROCEDURE  2012    cardioversion for atr fib    ZZC TOTAL KNEE ARTHROPLASTY Left 03/16/2018    Dr. Malick Suarez       Prior to Admission Medications   Prior to Admission Medications   Prescriptions Last Dose Informant Patient Reported? Taking?   allopurinol (ZYLOPRIM) 300 MG tablet   No No   Sig: Take 1 tablet (300 mg) by mouth daily   amLODIPine (NORVASC) 5 MG tablet   No No   Sig: Take 1 tablet (5 mg) by mouth daily   cetirizine (ZYRTEC) 10 MG tablet   Yes No   Sig: Take 10 mg by mouth as needed for allergies   finasteride (PROPECIA) 1 MG  tablet   No No   Sig: Take 1 tablet (1 mg) by mouth daily   furosemide (LASIX) 20 MG tablet   No No   Sig: Take 2 tablets (40 mg) by mouth daily   losartan (COZAAR) 100 MG tablet   No No   Sig: Take 1 tablet (100 mg) by mouth daily   metFORMIN (GLUCOPHAGE XR) 500 MG 24 hr tablet   No No   Sig: Take 1 tablet (500 mg) by mouth daily (with dinner)   potassium chloride ER (KLOR-CON M) 20 MEQ CR tablet   No No   Sig: Take 1 tablet (20 mEq) by mouth daily   predniSONE (DELTASONE) 10 MG tablet   No No   Sig: Take 6 tablets (60 mg) by mouth daily for 10 days, THEN 5 tablets (50 mg) daily for 14 days, THEN 4 tablets (40 mg) daily for 14 days, THEN 3 tablets (30 mg) daily for 14 days, THEN 3 tablets (30 mg) daily for 14 days, THEN 2 tablets (20 mg) daily for 14 days, THEN 1 tablet (10 mg) daily for 14 days, THEN 1 tablet (10 mg) every 48 hours for 14 days.   rivaroxaban ANTICOAGULANT (XARELTO ANTICOAGULANT) 20 MG TABS tablet   No No   Sig: Take 1 tablet (20 mg) by mouth daily (with dinner)   spironolactone (ALDACTONE) 25 MG tablet   No No   Sig: Take 0.5 tablets (12.5 mg) by mouth daily   sulfamethoxazole-trimethoprim (BACTRIM DS) 800-160 MG tablet   No No   Sig: Take 1 tablet by mouth daily      Facility-Administered Medications: None     Allergies   Allergies   Allergen Reactions    Amiodarone Shortness Of Breath     Suspected amiodarone toxicity involving lungs    No Known Drug Allergy        Social History   I have reviewed this patient's social history and updated it with pertinent information if needed. Mike Schultz  reports that he quit smoking about 12 years ago. His smoking use included cigarettes. He started smoking about 47 years ago. He has a 10 pack-year smoking history. He has never used smokeless tobacco. He reports that he does not currently use alcohol. He reports that he does not use drugs.    Family History   Family history assessed and, except as above, is non-contributory.    Family History   Problem  Relation Age of Onset    Family History Negative Mother     Heart Disease Father         decesased at 42 - MI    Heart Disease Sister          MI    Lipids Sister     Lipids Sister     No Known Problems Sister     Heart Disease Brother          MI    Heart Disease Brother         CABG AT 49    Lung Cancer No family hx of        Review of Systems   The 10 point Review of Systems is negative other than noted in the HPI or here.     Primary Care Physician   Ranjan Courtney    Data   Labs Ordered and Resulted from Time of ED Arrival to Time of ED Departure   COMPREHENSIVE METABOLIC PANEL - Abnormal       Result Value    Sodium 139      Potassium 3.7      Carbon Dioxide (CO2) 25      Anion Gap 15      Urea Nitrogen 28.5 (*)     Creatinine 1.25 (*)     GFR Estimate 61      Calcium 9.0      Chloride 99      Glucose 112 (*)     Alkaline Phosphatase 53      AST 28      ALT 37      Protein Total 6.5      Albumin 3.9      Bilirubin Total 0.3     TROPONIN T, HIGH SENSITIVITY - Abnormal    Troponin T, High Sensitivity 57 (*)    CBC WITH PLATELETS AND DIFFERENTIAL - Abnormal    WBC Count 13.4 (*)     RBC Count 4.10 (*)     Hemoglobin 13.4      Hematocrit 40.8            MCH 32.7      MCHC 32.8      RDW 14.8      Platelet Count 240      % Neutrophils 64      % Lymphocytes 24      % Monocytes 10      % Eosinophils 1      % Basophils 0      % Immature Granulocytes 1      NRBCs per 100 WBC 0      Absolute Neutrophils 8.5 (*)     Absolute Lymphocytes 3.2      Absolute Monocytes 1.4 (*)     Absolute Eosinophils 0.1      Absolute Basophils 0.1      Absolute Immature Granulocytes 0.1      Absolute NRBCs 0.0     NT PROBNP INPATIENT - Normal    N terminal Pro BNP Inpatient 442     TROPONIN T, HIGH SENSITIVITY       Data reviewed today:  I personally reviewed the EKG tracing showing afib and the chest x-ray image(s) showing increased interstitial markings .

## 2024-02-11 NOTE — ED NOTES
[facility]  ED Nurse Handoff Report    ED Chief complaint: Shortness of Breath and Atrial Fib      ED Diagnosis:   Final diagnoses:   None       Code Status: Full Code    Allergies:   Allergies   Allergen Reactions    Amiodarone Shortness Of Breath     Suspected amiodarone toxicity involving lungs    No Known Drug Allergy        Patient Story: SOB, afib  Focused Assessment:  Pt presents with afib, sob, and significant weight gain in past few days.  Has been started on a second diuretic and continues to retain fluids and gain weight.  Bilateral lower extremities swollen.Hx of recent admission with resp failure.  Pt A&O    Treatments and/or interventions provided:     Patient's response to treatments and/or interventions:       To be done/followed up on inpatient unit:        Does this patient have any cognitive concerns?:  none    Activity level - Baseline/Home:  Independent  Activity Level - Current:   Independent    Patient's Preferred language: English   Needed?: No    Isolation: None  Infection: Not Applicable  Patient tested for COVID 19 prior to admission: NO  Bariatric?: No    Vital Signs:   Vitals:    02/11/24 0135 02/11/24 0140   BP: (!) 152/85    Pulse: 103    Resp: 22    Temp:  96.9  F (36.1  C)   TempSrc:  Temporal   SpO2: 91%    Weight:  133.8 kg (295 lb)       Cardiac Rhythm:     Was the PSS-3 completed:   Yes  What interventions are required if any?               Family Comments:     For the majority of the shift this patient's behavior was Green.   Behavioral interventions performed were .    ED NURSE PHONE NUMBER:

## 2024-02-11 NOTE — PROGRESS NOTES
02/11/24 0740   Appointment Info   Signing Clinician's Name / Credentials (OT) Jean Carl OTR/L   Living Environment   People in Home significant other   Current Living Arrangements house   Home Accessibility stairs to enter home;stairs within home   Number of Stairs, Main Entrance 2   Number of Stairs, Within Home, Primary other (see comments)  (Full flight down to basement to bathroom, full flight upstairs to office.)   Transportation Anticipated family or friend will provide   Living Environment Comments Pt reports living in house w/ significant other. Bedroom on main level, bathroom on lower level. Walk in shower.   Self-Care   Usual Activity Tolerance good   Current Activity Tolerance moderate   Equipment Currently Used at Home none   Fall history within last six months no   Activity/Exercise/Self-Care Comment Pt reports being IND w/ all ADL's at baseline prior to admission.   Instrumental Activities of Daily Living (IADL)   Previous Responsibilities meal prep;housekeeping;laundry;medication management;finances;driving   IADL Comments Pt reports being IND w/ all IADL's at baseline prior to admission. Pt reports that they still drive.   General Information   Onset of Illness/Injury or Date of Surgery 02/11/24   Referring Physician Jose Luis Sosa MD   Patient/Family Therapy Goal Statement (OT) Return to home.   Additional Occupational Profile Info/Pertinent History of Current Problem Mike Schultz is a markedly pleasant 73 year old gentleman with past medical history that is most notable for morbid obesity, alcohol use disorder reportedly in remission, chronic paroxysmal atrial fibrillation on DOAC, chronic diastolic CHF, BOWEN on CPAP, and recently diagnosed amiodarone-induced lung toxicity on ongoing Prednisone taper, among others; who presents with exertional dyspnea with anasarca and is found to have acute diastolic CHF exacerbation and recurrent atrial fibrillation with RVR.   Existing  Precautions/Restrictions fall   Cognitive Status Examination   Orientation Status orientation to person, place and time   Visual Perception   Visual Impairment/Limitations WFL   Sensory   Sensory Quick Adds sensation intact   Pain Assessment   Patient Currently in Pain No   Range of Motion Comprehensive   General Range of Motion no range of motion deficits identified   Strength Comprehensive (MMT)   General Manual Muscle Testing (MMT) Assessment no strength deficits identified   Bed Mobility   Bed Mobility supine-sit   Supine-Sit [place] (Bed Mobility) modified independence   Transfers   Transfers sit-stand transfer;toilet transfer   Sit-Stand Transfer   Sit-Stand [place] (Transfers) modified independence   Toilet Transfer   Type (Toilet Transfer) stand-sit;sit-stand   [place] Level (Toilet Transfer) modified independence   Activities of Daily Living   BADL Assessment/Intervention lower body dressing;grooming;toileting   Lower Body Dressing Assessment/Training   Position (Lower Body Dressing) edge of bed sitting   [place] Level (Lower Body Dressing) minimum assist (75% patient effort)   Grooming Assessment/Training   Position (Grooming) sink side   [place] Level (Grooming) modified independence   Toileting   [place] Level (Toileting) modified independence   Clinical Impression   Criteria for Skilled Therapeutic Interventions Met (OT) Yes, treatment indicated   OT Diagnosis Decreased activity tolerance   OT Problem List-Impairments impacting ADL problems related to;activity tolerance impaired   Assessment of Occupational Performance 1-3 Performance Deficits   Identified Performance Deficits Home mgmt   Planned Therapy Interventions (OT) ADL retraining;home program guidelines;progressive activity/exercise;risk factor education   Clinical Decision Making Complexity (OT) problem focused assessment/low complexity   Risk & Benefits of therapy have been explained evaluation/treatment results  reviewed;care plan/treatment goals reviewed;risks/benefits reviewed;current/potential barriers reviewed;patient   OT Total Evaluation Time   OT Eval, Low Complexity Minutes (86858) 10   OT Goals   Therapy Frequency (OT) Daily   OT Predicted Duration/Target Date for Goal Attainment 02/16/24   OT Goals Lower Body Dressing   OT: Lower Body Dressing Modified independent;including set-up/clothing retrieval   OT: Understanding of cardiac education to maximize quality of life, condition management, and health outcomes Patient;Verbalize;Demonstrate   OT: Perform aerobic activity with stable cardiovascular response continuous;10 minutes;ambulation   OT: Functional/aerobic ambulation tolerance with stable cardiovascular response in order to return to home and community environment Modified independent;Greater than 300 feet   OT: Navigation of stairs simulating home set up with stable cardiovascular response in order to return to home and community environment Modified independent;8 stairs   Self-Care/Home Management   Self-Care/Home Mgmt/ADL, Compensatory, Meal Prep Minutes (79627) 8   Symptoms Noted During/After Treatment (Meal Preparation/Planning Training) none   Treatment Detail/Skilled Intervention Pt greeted supine in bed and agreeable for OT evaluation. Provided pt w/ HF packet and education provided; pt w/ no initial questions or concerns. Additional handouts provided regarding energy conservation. Pt reporting being unable to don B socks at this time d/t fatigue and weight gain over past. Assist from writer to don B socks.   Therapeutic Procedures/Exercise   Therapeutic Procedure: strength, endurance, ROM, flexibillity minutes (92937) 10   Symptoms Noted During/After Treatment fatigue   Treatment Detail/Skilled Intervention See below for details   Ambulation   Duration (minutes) 5 minutes   Effort Scale 4   Symptoms Fatigue   Cardiovascular Response Normal   Exercise Details Pt ambulated for approx. 5 minutes in  hallway w/ SBA and no AD. Pt remained on RA throughout w/ SpO2 89-93%. Cued pt intermittently in PLB in which pt was able to demonstrate understanding.   Cardiac Education   Education Provided Diet;Heart anatomy;Stop light tool   Education Packet Given to Patient Yes   All Patient Education Handouts Reviewed with Patient and/or Family Yes   OT Discharge Planning   OT Plan Timed ambulation, LB dressing w/ AE, review HF packet   OT Discharge Recommendation (DC Rec) home with assist   OT Rationale for DC Rec Pt is currently functioning slightly below baseline d/t decreased activity tolerance. Pt lives in house w/ SO and reports being IND w/ all I/ADL's at baseline. Anticipate w/ continued IP therapy that pt will be safe to d/c home w/ assist from family as needed.   OT Brief overview of current status See above   Total Session Time   Timed Code Treatment Minutes 18   Total Session Time (sum of timed and untimed services) 28

## 2024-02-11 NOTE — PROGRESS NOTES
Patient admitted early hours of this morning.  History was revisited with patient.    Continue excellent management as noted in H&P and cardiology note.    PTA meds reconciled.  Spironolactone on hold

## 2024-02-11 NOTE — PLAN OF CARE
Goal Outcome Evaluation:      Plan of Care Reviewed With: patient    Overall Patient Progress: improvingOverall Patient Progress: improving     DATE & TIME: 02/11/2024 [id number]    Cognitive Concerns/ Orientation : Alert/Oriented x 4   BEHAVIOR & AGGRESSION TOOL COLOR: Green   ABNL VS/O2: VSS, room air  MOBILITY: Independent  PAIN MANAGMENT: Denies  DIET: 2 gram sodium, start NPO at midnight   BOWEL/BLADDER: Continent, Strict I/Os-using urinal + hat in toilet  ABNL LAB/BG: Mag 1.7 - replaced and rechack in am, trop 59  DRAIN/DEVICES: R PIV saline locked  TELEMETRY RHYTHM: Afib RVR (105) with occasional PVC   SKIN: Large scab to lateral R shin; BLE +3/+4 pitting edema, karine, blanchable   TESTS/PROCEDURES: n/a  D/C DATE: Pending  OTHER IMPORTANT INFO: Cardiology consult; 2 Home meds walked down to Pharmacy upon admit. EP to see tomorrow.

## 2024-02-11 NOTE — CONSULTS
[facility]    CARDIOLOGY CONSULT    Date of Admission:  2/11/2024  Date of Consult: February 11, 2024    ASSESSMENT:  73-year-old male seen for paroxysmal A-fib and acute on chronic diastolic dysfunction.  He has a long complicated history of A-fib.  He is back in A-fib now with controlled rates for about 10 days with 10 to 20 pounds of fluid retention.  Plan will be for diuresis.  EP can see the patient tomorrow.  They can discuss options of alternate antiarrhythmic and potential cardioversion or repeat ablation.  He is on chronic Xarelto.  Rates seem to be well-controlled without any medication.    RECOMMENDATIONS:  1.  Paroxysmal A-fib  -Continue Xarelto  -EP to see patient to discuss antiarrhythmic, cardioversion, or repeat ablation  -Will make n.p.o. after midnight until seen by EP, in case they plan for any procedure    2.  Acute on chronic diastolic CHF  -Continue IV furosemide, dry weight probably 270 pounds    Israel Abarca MD  Cardiology - [facility]  Pager:  [phone]  Text Page  February 11, 2024    CODE STATUS:  Full Code    REASON FOR CONSULT: Atrial fibrillation    PRIMARY CARE PHYSICIAN:  Ranjan Courtney    HISTORY OF PRESENT ILLNESS:  73 year old male seen for atrial fibrillation.  He has aortic stenosis, hypertension, sleep apnea, history of alcohol use.    He has a long history of A-fib since 2012.  He has been on flecainide and more recently intermittently on amiodarone.  He underwent PVI and SVC ablation in 2017 with repeat in 2019.  He has had multiple cardioversions.    September cardioversion was unsuccessful.  Amiodarone was restarted, repeat cardioversion was successful in October.    Echo November 2023 showed EF 60%, aortic stenosis with mean 32 mmHg, area 1.2 cm .    He is now off amiodarone due to pulmonary toxicity.  He went into A-fib about 10 days ago.  He has had much worsening lower extremity edema since then.  He thinks dry weight is in the 260 to 270  pound range, he currently is over 290 pounds.  Heart rate has been in the 70s and 80s in A-fib on his Apple Watch.    PAST MEDICAL HISTORY:  I have reviewed this patient's medical history and updated it with pertinent information if needed.   Past Medical History:   Diagnosis Date    (HFpEF) heart failure with preserved ejection fraction (H)     Acute gouty arthritis     Alcohol use disorder in remission     Amiodarone pulmonary toxicity     Aortic stenosis     Basal cell carcinoma     Of the nose    Bell's palsy     Herpes simplex without mention of complication     Hyperlipidemia     Hypertension 2000    Persistent atrial fibrillation (H)     persistent, DCCV 12/2017, ablation 11/6/17    Pulmonary nodules     Sleep apnea     CPAP       PAST SURGICAL HISTORY:  I have reviewed this patient's surgical history and updated it with pertinent information if needed.  Past Surgical History:   Procedure Laterality Date    ABLATION OF DYSRHYTHMIC FOCUS  2018, 04/12/2019    Procedure: EP Ablation Focal AFIB;  Surgeon: Fady Day MD;  Location:  HEART CARDIAC CATH LAB    ACHILLES TENDON SURGERY  1997    ANESTHESIA CARDIOVERSION N/A 2/26/2019    Procedure: ANESTHESIA CARDIOVERSION (CONNER);  Surgeon: GENERIC ANESTHESIA PROVIDER;  Location:  OR    ANESTHESIA CARDIOVERSION N/A 11/19/2020    Procedure: ANESTHESIA, FOR CARDIOVERSION (dr campbell);  Surgeon: GENERIC ANESTHESIA PROVIDER;  Location:  OR    ANESTHESIA CARDIOVERSION N/A 8/22/2022    Procedure: CARDIOVERSION;  Surgeon: GENERIC ANESTHESIA PROVIDER;  Location:  OR    ANESTHESIA CARDIOVERSION N/A 9/13/2023    Procedure: Anesthesia cardioversion;  Surgeon: GENERIC ANESTHESIA PROVIDER;  Location:  OR    ANESTHESIA CARDIOVERSION N/A 10/6/2023    Procedure: Anesthesia cardioversion;  Surgeon: GENERIC ANESTHESIA PROVIDER;  Location:  OR    BASAL CELL CARCINOMA EXCISION Right 2009    nose, took cartilage from inner ear.    CARDIOVERSION  2012, 2019    cardioversion  for atr fib    COLONOSCOPY  2008    COLONOSCOPY N/A 3/22/2023    Procedure: COLONOSCOPY, FLEXIBLE, WITH LESION REMOVAL USING SNARE;  Surgeon: Anne Lucero MD;  Location:  GI    EP ABLATION FOCAL AFIB N/A 4/12/2019    Procedure: EP Ablation Focal AFIB;  Surgeon: Fady Day MD;  Location:  HEART CARDIAC CATH LAB    HERNIA REPAIR, UMBILICAL  08/20/2012    Procedure: HERNIORRHAPHY UMBILICAL;  UMBILICAL HERNIA REPAIR;  Surgeon: Ra Crocker MD;  Location: [facility]    HERNIORRHAPHY UMBILICAL  8/20/2012    Procedure: HERNIORRHAPHY UMBILICAL;  UMBILICAL HERNIA REPAIR;  Surgeon: Ra Crocker MD;  Location: [facility]    ORTHOPEDIC SURGERY      ORTHOPEDIC SURGERY      OTHER SURGICAL HISTORY Right 03/2017    total KNEE ARTHROSCOPY     TOTAL KNEE ARTHROPLASTY Left 03/16/2018    [facility] NONSPECIFIC PROCEDURE  1997    achilles tendon    [facility] NONSPECIFIC PROCEDURE      knees, arthroscopy    [facility] NONSPECIFIC PROCEDURE      basal cell skin ca, nose    [facility] NONSPECIFIC PROCEDURE      flex sig; colonoscopy due 3/2008    [facility] NONSPECIFIC PROCEDURE  2012    cardioversion for atr fib    [facility] TOTAL KNEE ARTHROPLASTY Left 03/16/2018    Dr. Malick Suarez       HOME MEDICATIONS:  Prior to Admission Medications   Prescriptions Last Dose Informant Patient Reported? Taking?   allopurinol (ZYLOPRIM) 300 MG tablet   No No   Sig: Take 1 tablet (300 mg) by mouth daily   amLODIPine (NORVASC) 5 MG tablet   No No   Sig: Take 1 tablet (5 mg) by mouth daily   cetirizine (ZYRTEC) 10 MG tablet   Yes No   Sig: Take 10 mg by mouth as needed for allergies   finasteride (PROPECIA) 1 MG tablet   No No   Sig: Take 1 tablet (1 mg) by mouth daily   furosemide (LASIX) 20 MG tablet   No No   Sig: Take 2 tablets (40 mg) by mouth daily   losartan (COZAAR) 100 MG tablet   No No   Sig: Take 1 tablet (100 mg) by mouth daily   metFORMIN (GLUCOPHAGE XR) 500 MG 24 hr tablet   No No   Sig: Take 1 tablet (500 mg) by mouth daily (with dinner)   potassium  chloride ER (KLOR-CON M) 20 MEQ CR tablet   No No   Sig: Take 1 tablet (20 mEq) by mouth daily   predniSONE (DELTASONE) 10 MG tablet   No No   Sig: Take 6 tablets (60 mg) by mouth daily for 10 days, THEN 5 tablets (50 mg) daily for 14 days, THEN 4 tablets (40 mg) daily for 14 days, THEN 3 tablets (30 mg) daily for 14 days, THEN 3 tablets (30 mg) daily for 14 days, THEN 2 tablets (20 mg) daily for 14 days, THEN 1 tablet (10 mg) daily for 14 days, THEN 1 tablet (10 mg) every 48 hours for 14 days.   rivaroxaban ANTICOAGULANT (XARELTO ANTICOAGULANT) 20 MG TABS tablet   No No   Sig: Take 1 tablet (20 mg) by mouth daily (with dinner)   spironolactone (ALDACTONE) 25 MG tablet   No No   Sig: Take 0.5 tablets (12.5 mg) by mouth daily   sulfamethoxazole-trimethoprim (BACTRIM DS) 800-160 MG tablet   No No   Sig: Take 1 tablet by mouth daily      Facility-Administered Medications: None     ALLERGIES:  Allergies   Allergen Reactions    Amiodarone Shortness Of Breath     Suspected amiodarone toxicity involving lungs    No Known Drug Allergy      SOCIAL HISTORY:  I have reviewed this patient's social history and updated it with pertinent information if needed. Mike LIMON Antoine  reports that he quit smoking about 12 years ago. His smoking use included cigarettes. He started smoking about 47 years ago. He has a 10 pack-year smoking history. He has never used smokeless tobacco. He reports that he does not currently use alcohol. He reports that he does not use drugs.    FAMILY HISTORY:  I have reviewed this patient's family history and updated it with pertinent information if needed.   Family History   Problem Relation Age of Onset    Family History Negative Mother     Heart Disease Father         decesased at 42 - MI    Heart Disease Sister          MI    Lipids Sister     Lipids Sister     No Known Problems Sister     Heart Disease Brother          MI    Heart Disease Brother         CABG AT 49    Lung Cancer No  family hx of      REVIEW OF SYSTEMS:  Constitutional:  No weight loss, fever, chills  HEENT:  Eyes:  No visual loss, blurred vision, double vision or yellow sclerae. No hearing loss, sneezing, congestion, runny nose or sore throat.  Skin:  No rash or itching.  Cardiovascular: per HPI  Respiratory: per HPI  GI:  No anorexia, nausea, vomiting or diarrhea. No abdominal pain or blood.  :  No dysurea, hematuria  Neurologic:  No headache, paralysis, ataxia, numbness or tingling in the extremities. No change in bowel or bladder control.  Musculoskeletal:  No muscle pain  Hematologic:  No bleeding or bruising.  Lymphatics:  No enlarged nodes. No history of splenectomy.  Endocrine:  No reports of sweating, cold or heat intolerance. No polyuria or polydipsia.  Allergies:  No history of asthma, hives, eczema or rhinitis.    PHYSICAL EXAM:  Temp: 97.6  F (36.4  C) Temp src: Oral BP: (!) 140/92 Pulse: 82   Resp: 18 SpO2: 94 % O2 Device: None (Room air)    Vital Signs with Ranges  Temp:  [96.9  F (36.1  C)-97.6  F (36.4  C)] 97.6  F (36.4  C)  Pulse:  [] 82  Resp:  [13-22] 18  BP: (124-152)/(75-92) 140/92  SpO2:  [91 %-94 %] 94 %  298 lbs 14.4 oz    Constitutional: awake, alert, no distress  Eyes: PERRL, sclera nonicteric  ENT: trachea midline  Respiratory: Lungs clear  Cardiovascular: Regular rate, totally irregular rhythm, 2/6 systolic murmur at the apex, 2-3+ pitting edema to the knees  GI: nondistended, nontender, bowel sounds present  Lymph/Hematologic: no lymphadenopathy  Skin: dry, no rash  Musculoskeletal: good muscle tone, strength 5/5 in upper and lower extremities  Neurologic: no focal deficits  Neuropsychiatric: appropriate affact    Intake/Output Summary (Last 24 hours) at 2/11/2024 0911  Last data filed at 2/11/2024 0743  Gross per 24 hour   Intake --   Output 1050 ml   Net -1050 ml     Clinically Significant Risk Factors Present on Admission               # Drug Induced Coagulation Defect: home medication  list includes an anticoagulant medication    # Hypertension: Noted on problem list              Cardiac Arrhythmia: Atrial fibrillation: Paroxysmal    DATA:  Labs: Potassium 3.7, creatinine 1.2, NT proBNP 440, troponin 57, WBC 12, hemoglobin 13, platelets 227  Recent Labs   Lab Test 07/27/23  0955 10/06/21  1759   CHOL 163 173   HDL 39* 38*   LDL 97 103*   TRIG 133 161*     EKG: February 11: A-fib, rate 95    Tele: A-fib, rate 80s

## 2024-02-11 NOTE — DISCHARGE INSTRUCTIONS
Pt has 2 medications from home (Spironolactone & Furosemide) that were walked down by writer to the pharmacy***

## 2024-02-11 NOTE — ED PROVIDER NOTES
History     Chief Complaint:  Shortness of Breath and Atrial Fib       HPI   Mike Schultz is a 73 year old male who presented to the emergency department with shortness of breath and atrial fibrillation concerns.  He has been in atrial fibrillation for several weeks.  His heart rate is usually .  He is not on any rate controlling medications at this time.  Is on anticoagulation.  Reports shortness of breath with exertion.  Has had significant weight gain.  Has gained about 20 pounds in a week and a half.  No fever or cough.  No chest pain.    Independent Historian:   None - Patient Only    Review of External Notes:   Reviewed hospitalization at end of November where he was admitted for amiodarone toxicity    Medications:    allopurinol (ZYLOPRIM) 300 MG tablet  amLODIPine (NORVASC) 5 MG tablet  cetirizine (ZYRTEC) 10 MG tablet  finasteride (PROPECIA) 1 MG tablet  furosemide (LASIX) 20 MG tablet  losartan (COZAAR) 100 MG tablet  metFORMIN (GLUCOPHAGE XR) 500 MG 24 hr tablet  potassium chloride ER (KLOR-CON M) 20 MEQ CR tablet  predniSONE (DELTASONE) 10 MG tablet  rivaroxaban ANTICOAGULANT (XARELTO ANTICOAGULANT) 20 MG TABS tablet  spironolactone (ALDACTONE) 25 MG tablet  sulfamethoxazole-trimethoprim (BACTRIM DS) 800-160 MG tablet      Past Medical History:    Past Medical History:   Diagnosis Date    Arthritis     Basal cell carcinoma     Cancer (H)     H/O ETOH abuse     Herpes simplex without mention of complication     Hyperlipidemia     Hypertension 2000    Obesity     Persistent atrial fibrillation (H)     Sleep apnea     Tobacco use disorder      Past Surgical History:    Past Surgical History:   Procedure Laterality Date    ABLATION OF DYSRHYTHMIC FOCUS  2018, 04/12/2019    Procedure: EP Ablation Focal AFIB;  Surgeon: Fady Day MD;  Location:  HEART CARDIAC CATH LAB    ACHILLES TENDON SURGERY  1997    ANESTHESIA CARDIOVERSION N/A 2/26/2019    Procedure: ANESTHESIA CARDIOVERSION (CONNER);   Surgeon: GENERIC ANESTHESIA PROVIDER;  Location:  OR    ANESTHESIA CARDIOVERSION N/A 11/19/2020    Procedure: ANESTHESIA, FOR CARDIOVERSION (dr campbell);  Surgeon: GENERIC ANESTHESIA PROVIDER;  Location:  OR    ANESTHESIA CARDIOVERSION N/A 8/22/2022    Procedure: CARDIOVERSION;  Surgeon: GENERIC ANESTHESIA PROVIDER;  Location:  OR    ANESTHESIA CARDIOVERSION N/A 9/13/2023    Procedure: Anesthesia cardioversion;  Surgeon: GENERIC ANESTHESIA PROVIDER;  Location:  OR    ANESTHESIA CARDIOVERSION N/A 10/6/2023    Procedure: Anesthesia cardioversion;  Surgeon: GENERIC ANESTHESIA PROVIDER;  Location:  OR    BASAL CELL CARCINOMA EXCISION Right 2009    nose, took cartilage from inner ear.    CARDIOVERSION  2012, 2019    cardioversion for atr fib    COLONOSCOPY  2008    COLONOSCOPY N/A 3/22/2023    Procedure: COLONOSCOPY, FLEXIBLE, WITH LESION REMOVAL USING SNARE;  Surgeon: Anne Lucero MD;  Location:  GI    EP ABLATION FOCAL AFIB N/A 4/12/2019    Procedure: EP Ablation Focal AFIB;  Surgeon: Fady Day MD;  Location:  HEART CARDIAC CATH LAB    HERNIA REPAIR, UMBILICAL  08/20/2012    Procedure: HERNIORRHAPHY UMBILICAL;  UMBILICAL HERNIA REPAIR;  Surgeon: Ra Crocker MD;  Location: [facility] [place]    HERNIORRHAPHY UMBILICAL  8/20/2012    Procedure: HERNIORRHAPHY UMBILICAL;  UMBILICAL HERNIA REPAIR;  Surgeon: Ra Crocker MD;  Location: [facility] [place]    ORTHOPEDIC SURGERY      ORTHOPEDIC SURGERY      OTHER SURGICAL HISTORY Right 03/2017    total KNEE ARTHROSCOPY     TOTAL KNEE ARTHROPLASTY Left 03/16/2018    [facility] NONSPECIFIC PROCEDURE  1997    achilles tendon    [facility] NONSPECIFIC PROCEDURE      knees, arthroscopy    [facility] NONSPECIFIC PROCEDURE      basal cell skin ca, nose    [facility] NONSPECIFIC PROCEDURE      flex sig; colonoscopy due 3/2008    [facility] NONSPECIFIC PROCEDURE  2012    cardioversion for atr fib    [facility] TOTAL KNEE ARTHROPLASTY Left 03/16/2018    Dr. Malick Suarez        Physical Exam   Patient Vitals for  the past 24 hrs:   BP Temp Temp src Pulse Resp SpO2 Weight   02/11/24 0140 -- 96.9  F (36.1  C) Temporal -- -- -- 133.8 kg (295 lb)   02/11/24 0135 (!) 152/85 -- -- 103 22 91 % --      Physical Exam  General: Sitting up in bed  Eyes:  The pupils are equal and round    Conjunctivae and sclerae are normal  ENT:    Atraumatic face  Neck:  Normal range of motion  CV:  Irregular rate and rhythm    Skin warm and well perfused   Resp:  Non labored breathing on room air    No tachypnea    No cough heard    Lungs diminished in bases  GI:  Abdomen is soft, there is no rigidity    No distension    No rebound tenderness     No abdominal tenderness  MS:  Bilateral LE edema  Skin:  Wound on right lower extremity that is healing with scant serous drainage  Neuro:   Awake, alert.      Speech is normal and fluent.    Face is symmetric.     Moves all extremities equally  Psych: Normal affect.  Appropriate interactions.    Emergency Department Course   ECG    ECG results from 02/11/24   EKG 12-lead, tracing only     Value    Systolic Blood Pressure     Diastolic Blood Pressure     Ventricular Rate 95    Atrial Rate     TX Interval     QRS Duration 90        QTc 462    P Axis     R AXIS 27    T Axis -1    Interpretation ECG      Atrial fibrillation with premature ventricular or aberrantly conducted complexes  Minimal voltage criteria for LVH, may be normal variant ( R in aVL )  Cannot rule out Inferior infarct , age undetermined  Abnormal ECG  When compared with ECG of 25-NOV-2023 19:14,  Atrial fibrillation has replaced Sinus rhythm  Minimal criteria for Inferior infarct are now Present  Nonspecific T wave abnormality now evident in Inferior leads  Confirmed by GENERATED REPORT, COMPUTER ([id number]),  Tara Mathur ([id number]) on 2/11/2024 1:49:25 AM       Imaging:  XR Chest 2 Views   Final Result   IMPRESSION: Stable borderline enlarged heart with moderate pulmonary vascular congestion and interstitial edema. Superimposed  infectious infiltrates not entirely excluded and continued attention on follow-up recommended. Probable trace pleural effusions.    No pneumothorax. Acromioclavicular joint and thoracic spine degenerative changes.         Laboratory:  Labs Ordered and Resulted from Time of ED Arrival to Time of ED Departure   COMPREHENSIVE METABOLIC PANEL - Abnormal       Result Value    Sodium 139      Potassium 3.7      Carbon Dioxide (CO2) 25      Anion Gap 15      Urea Nitrogen 28.5 (*)     Creatinine 1.25 (*)     GFR Estimate 61      Calcium 9.0      Chloride 99      Glucose 112 (*)     Alkaline Phosphatase 53      AST 28      ALT 37      Protein Total 6.5      Albumin 3.9      Bilirubin Total 0.3     TROPONIN T, HIGH SENSITIVITY - Abnormal    Troponin T, High Sensitivity 57 (*)    CBC WITH PLATELETS AND DIFFERENTIAL - Abnormal    WBC Count 13.4 (*)     RBC Count 4.10 (*)     Hemoglobin 13.4      Hematocrit 40.8            MCH 32.7      MCHC 32.8      RDW 14.8      Platelet Count 240      % Neutrophils 64      % Lymphocytes 24      % Monocytes 10      % Eosinophils 1      % Basophils 0      % Immature Granulocytes 1      NRBCs per 100 WBC 0      Absolute Neutrophils 8.5 (*)     Absolute Lymphocytes 3.2      Absolute Monocytes 1.4 (*)     Absolute Eosinophils 0.1      Absolute Basophils 0.1      Absolute Immature Granulocytes 0.1      Absolute NRBCs 0.0     NT PROBNP INPATIENT - Normal    N terminal Pro BNP Inpatient 442     TROPONIN T, HIGH SENSITIVITY      Emergency Department Course & Assessments:    Interventions:  Medications   furosemide (LASIX) injection 60 mg (60 mg Intravenous $Given 2/11/24 7027)      Independent Interpretation (X-rays, CTs, rhythm strip):  I independently reviewed chest xray - pulmonary edema seen    Consultations/Discussion of Management or Tests:  Discussed with hospitalist Dr. Sosa for admission    Social Determinants of Health affecting care:   None    Disposition:  The patient was  admitted to the hospital under the care of Dr. Sosa.     Impression & Plan      Medical Decision Making:  Mike Schultz is a 73-year-old male who presented to the emergency department shortness of breath and weight gain.  Has had significant weight gain in the last 2 weeks.  Concern for CHF.  His heart rate is controlled in the emergency department but he is worried that the atrial fibrillation is contributing to his weight gain. Chest xray with edema.  Even though patient not hypoxic, I do think patient requires hospitalization for diuresis given significant weight gain.  Suspect that troponin is elevated due to demand ischemia and not primary ACS/NSTEMI.  No indication for cardioversion at this time given that has been in atrial fibrillation for several weeks and hemodynamically stable.    Diagnosis:    ICD-10-CM    1. Acute on chronic congestive heart failure, unspecified heart failure type (H)  I50.9       2. Chronic atrial fibrillation (H)  I48.20            2/11/2024   Jaida Olsen MD Goertz, Maria Kristine, MD  02/11/24 0434

## 2024-02-11 NOTE — PROGRESS NOTES
RECEIVING UNIT ED HANDOFF REVIEW    ED Nurse Handoff Report was reviewed by: Tere Shoemaker RN on February 11, 2024 at 4:31 AM

## 2024-02-11 NOTE — PLAN OF CARE
DATE & TIME: 02/11/2024 [id number]    Cognitive Concerns/ Orientation : Alert/Oriented x 4   BEHAVIOR & AGGRESSION TOOL COLOR: Green   ABNL VS/O2: VSS, room air  MOBILITY: Independent  PAIN MANAGMENT: Denies  DIET: 2 gram sodium  BOWEL/BLADDER: Continent, Strict I/Os-using urinal + hat in toilet  ABNL LAB/BG:   DRAIN/DEVICES: R PIV saline locked  TELEMETRY RHYTHM: Afib CVR with occasional BBB  SKIN: Large scab to lateral R shin; BLE +3/+4 pitting edema, karine  TESTS/PROCEDURES: n/a  D/C DATE: Pending  OTHER IMPORTANT INFO: Cardiology consult; 2 Home meds walked down to Pharmacy by writer

## 2024-02-12 ENCOUNTER — APPOINTMENT (OUTPATIENT)
Dept: OCCUPATIONAL THERAPY | Facility: CLINIC | Age: 74
DRG: 292 | End: 2024-02-12
Payer: COMMERCIAL

## 2024-02-12 LAB
ANION GAP SERPL CALCULATED.3IONS-SCNC: 10 MMOL/L (ref 7–15)
BUN SERPL-MCNC: 25.3 MG/DL (ref 8–23)
CALCIUM SERPL-MCNC: 8.9 MG/DL (ref 8.8–10.2)
CHLORIDE SERPL-SCNC: 98 MMOL/L (ref 98–107)
CREAT SERPL-MCNC: 1.23 MG/DL (ref 0.67–1.17)
DEPRECATED HCO3 PLAS-SCNC: 32 MMOL/L (ref 22–29)
EGFRCR SERPLBLD CKD-EPI 2021: 62 ML/MIN/1.73M2
GLUCOSE SERPL-MCNC: 111 MG/DL (ref 70–99)
MAGNESIUM SERPL-MCNC: 2 MG/DL (ref 1.7–2.3)
POTASSIUM SERPL-SCNC: 3.4 MMOL/L (ref 3.4–5.3)
POTASSIUM SERPL-SCNC: 3.6 MMOL/L (ref 3.4–5.3)
SODIUM SERPL-SCNC: 140 MMOL/L (ref 135–145)

## 2024-02-12 PROCEDURE — 36415 COLL VENOUS BLD VENIPUNCTURE: CPT | Performed by: INTERNAL MEDICINE

## 2024-02-12 PROCEDURE — 99222 1ST HOSP IP/OBS MODERATE 55: CPT | Performed by: INTERNAL MEDICINE

## 2024-02-12 PROCEDURE — 120N000001 HC R&B MED SURG/OB

## 2024-02-12 PROCEDURE — 84132 ASSAY OF SERUM POTASSIUM: CPT | Performed by: INTERNAL MEDICINE

## 2024-02-12 PROCEDURE — 99233 SBSQ HOSP IP/OBS HIGH 50: CPT | Performed by: INTERNAL MEDICINE

## 2024-02-12 PROCEDURE — 250N000013 HC RX MED GY IP 250 OP 250 PS 637: Performed by: INTERNAL MEDICINE

## 2024-02-12 PROCEDURE — 250N000011 HC RX IP 250 OP 636: Performed by: HOSPITALIST

## 2024-02-12 PROCEDURE — 83735 ASSAY OF MAGNESIUM: CPT | Performed by: INTERNAL MEDICINE

## 2024-02-12 PROCEDURE — 250N000012 HC RX MED GY IP 250 OP 636 PS 637: Performed by: INTERNAL MEDICINE

## 2024-02-12 PROCEDURE — 80048 BASIC METABOLIC PNL TOTAL CA: CPT | Performed by: INTERNAL MEDICINE

## 2024-02-12 PROCEDURE — 97110 THERAPEUTIC EXERCISES: CPT | Mod: GO

## 2024-02-12 RX ORDER — MAGNESIUM OXIDE 400 MG/1
400 TABLET ORAL EVERY 4 HOURS
Status: COMPLETED | OUTPATIENT
Start: 2024-02-12 | End: 2024-02-12

## 2024-02-12 RX ORDER — METOPROLOL TARTRATE 25 MG/1
25 TABLET, FILM COATED ORAL 2 TIMES DAILY
Status: DISCONTINUED | OUTPATIENT
Start: 2024-02-12 | End: 2024-02-13 | Stop reason: HOSPADM

## 2024-02-12 RX ORDER — POTASSIUM CHLORIDE 1500 MG/1
40 TABLET, EXTENDED RELEASE ORAL ONCE
Status: COMPLETED | OUTPATIENT
Start: 2024-02-12 | End: 2024-02-12

## 2024-02-12 RX ADMIN — SULFAMETHOXAZOLE AND TRIMETHOPRIM 1 TABLET: 800; 160 TABLET ORAL at 10:13

## 2024-02-12 RX ADMIN — Medication 400 MG: at 17:34

## 2024-02-12 RX ADMIN — FUROSEMIDE 60 MG: 10 INJECTION, SOLUTION INTRAMUSCULAR; INTRAVENOUS at 04:27

## 2024-02-12 RX ADMIN — FINASTERIDE 1 MG: 1 TABLET, FILM COATED ORAL at 10:13

## 2024-02-12 RX ADMIN — METOPROLOL TARTRATE 25 MG: 25 TABLET, FILM COATED ORAL at 20:05

## 2024-02-12 RX ADMIN — RIVAROXABAN 20 MG: 20 TABLET, FILM COATED ORAL at 17:34

## 2024-02-12 RX ADMIN — Medication 400 MG: at 20:05

## 2024-02-12 RX ADMIN — FUROSEMIDE 60 MG: 10 INJECTION, SOLUTION INTRAMUSCULAR; INTRAVENOUS at 20:05

## 2024-02-12 RX ADMIN — ALLOPURINOL 300 MG: 300 TABLET ORAL at 10:13

## 2024-02-12 RX ADMIN — FUROSEMIDE 60 MG: 10 INJECTION, SOLUTION INTRAMUSCULAR; INTRAVENOUS at 11:56

## 2024-02-12 RX ADMIN — METOPROLOL TARTRATE 25 MG: 25 TABLET, FILM COATED ORAL at 11:41

## 2024-02-12 RX ADMIN — PREDNISONE 20 MG: 20 TABLET ORAL at 10:13

## 2024-02-12 RX ADMIN — POTASSIUM CHLORIDE 20 MEQ: 1500 TABLET, EXTENDED RELEASE ORAL at 10:13

## 2024-02-12 RX ADMIN — AMLODIPINE BESYLATE 5 MG: 5 TABLET ORAL at 10:13

## 2024-02-12 RX ADMIN — POTASSIUM CHLORIDE 40 MEQ: 1500 TABLET, EXTENDED RELEASE ORAL at 17:35

## 2024-02-12 RX ADMIN — LOSARTAN POTASSIUM 100 MG: 100 TABLET, FILM COATED ORAL at 10:13

## 2024-02-12 ASSESSMENT — ACTIVITIES OF DAILY LIVING (ADL)
ADLS_ACUITY_SCORE: 20
ADLS_ACUITY_SCORE: 22
DEPENDENT_IADLS:: INDEPENDENT
ADLS_ACUITY_SCORE: 20
ADLS_ACUITY_SCORE: 20
ADLS_ACUITY_SCORE: 22
ADLS_ACUITY_SCORE: 22
ADLS_ACUITY_SCORE: 20
ADLS_ACUITY_SCORE: 20

## 2024-02-12 NOTE — CONSULTS
[facility]    Received [facility] Consult from Dr. Sosa.     Reviewed Brayden's chart and note they are admitted for Afib and HFpEF. Echocardiogram shows LVEF 55-60%. This is their first admission in the past year for concerns of heart failure.     Given above information and LVEF >/= 40% , Brayden meets criteria for general Cardiology vs EP follow up. EP consulted. Will defer outpatient follow up to inpatient Cardiology and EP team.     Of note, patient does have previously scheduled visit with our EP OTTO 2/23/24.    Bedside RN to do CHF education.     Future Appointments   Date Time Provider Department Center   2/12/2024 10:45 AM Christa Suarez OTR SHOT [place]   2/23/2024  7:30 AM Natalie Campos PA-C SUUMHT [facility] CLIN   2/23/2024  9:30 AM Ranjan Courtney MD O[facility] OX   3/7/2024  9:00 AM RH PULMONARY FUNCTION RHRESP [place] RID   3/11/2024  9:00 AM [id number] RHSCCT [facility]   3/11/2024  9:30 AM Jeanette Mckeon MD RHCCRS [place] RID   6/13/2024  1:45 PM [facility]   6/13/2024  2:45 PM [facility]   7/29/2024  8:30 AM Curtis Parra MD OXIM OX   11/11/2024 10:15 AM Pedro Luis Romeo MD [facility] [place]       Please call with questions.      Olga Rojas RN BSN  [facility] RN Care Coordinator   [facility]   [phone]   [facility]: Cardiomyopathy, Optimization, Rehabilitation, Education

## 2024-02-12 NOTE — CONSULTS
REASON FOR ASSESSMENT:  CHF Consult for 2 gm NA Diet Education    NUTRITION HISTORY:    Information obtained from:  Pt and chart review    Living situation:   Home by self    Grocery shopping:  Pt    Meal preparation:  Pt    Breakfast:  2 eggs, 3 pieces chappell, and hashbrowns    Lunch:  Sloppy helen    Dinner:   Chicken with mashed potatoes or salad with a pork chop    Previous diet instructions:  none    CURRENT DIET:  2 gram sodium diet    NUTRITION DIAGNOSIS:  Food- and nutrition-related knowledge deficit R/t no previous low sodium diet education in the past AEB pt report        INTERVENTIONS:    Nutrition Prescription:  Follow a low sodium diet upon discharge   Avoid salt replacements until following up and discussing their use with primary care provider.    Implementation:  Assessed learning needs, learning preferences, and willingness to learn  Nutrition Education (Content):  Provided handouts:  Tips for Low Na Diet  Label Reading  Low Na Foods/Drinks  Seasoning Your Food Without Salt  Low Na Recipe Booklet  Discussed rational for limiting Na for CHF and stressed importance of following 2 gm Na guidelines   Encouraged patient to keep a daily food record  Nutrition Education (Application):  Discussed current eating habits and recommended alternative food choices  Anticipated good compliance  Diet Education - refer to Education Flowsheet    Goals:  Patient verbalizes understanding of diet by asking several questions, including the use of salt replacements, and stating multiple changes he can make to his current shopping and meal preparation practices.   All of the above goals met during the education session    Follow Up:  Provided RD contact information for future questions.  Recommend Out-Patient Nutrition Referral, if further diet instructions are needed.    Isaac Pandya RD, LD

## 2024-02-12 NOTE — PLAN OF CARE
Goal Outcome Evaluation:      Plan of Care Reviewed With: patient    Summary: Acute/chronic CHF exacerbation. AFib with RVR   DATE & TIME: 02/11/2024 5085-6220 Cognitive Concerns/ Orientation : Alert/Oriented x 4   BEHAVIOR & AGGRESSION TOOL COLOR: Green   ABNL VS/O2: VSS, O2 low 90% on RA, DELEON.   MOBILITY: Independent, steady  PAIN MANAGMENT: Denies  DIET: 2 gram sodium, NPO from midnight   BOWEL/BLADDER: Continent, Strict I/Os-using urinal + hat in toilet  ABNL LAB/BG: Mag 1.7 (high protocol)- replaced and recheck in am, trop 59, WBC 12.7.   DRAIN/DEVICES: R PIV saline locked  TELEMETRY RHYTHM: Afib RVR (105) with occasional PVC   SKIN: Large scab to lateral R shin, open to air, no redness noted. BLE karine, +3/+4 pitting edema, elevated on pillows   TESTS/PROCEDURES: s/p Echo complete.   D/C DATE: Pending cardiology clearance.   OTHER IMPORTANT INFO: Cardiology consult. NPO after midnight until seen by EP tomorrow, in case they plan for any procedure. Clarified with cardiology, okayed to administer PTA Xarelto. OT recommended home with assist.

## 2024-02-12 NOTE — CONSULTS
CARDIAC ELECTROPHYSIOLOGY CONSULTATION  February 12, 2024    REQUESTING PROVIDER:  MING Abarca MD    REASON FOR CONSULTATION:  AF with RVR, CHF.      HISTORY OF PRESENT ILLNESS:    73-year-old male with long history of atrial fibrillation who presented the ER at [facility] on 2/11/2024 with severe dyspnea. He had gained approximately 20 pounds in the preceding 10 days. In the ER he was in AF with RVR. He was not taking rate control medications at home.    Mr. Schultz has history of atrial fibrillation since 2012. He was placed on flecainide with recurrence. Initial PVI and SVC ablation by Dr. Day in 2017. The patient later had more atrial fibrillation and underwent repeat ablation in 2019 (repeat PVI, CTI ablation). Treated with amiodarone between 2021 and 2023, the drug was stopped in January 2023. He had recurrent arrhythmias starting in July 2023 in the setting of alcohol abuse. Unsuccessful CV attempt in 09/2023. He was reloaded with amiodarone and underwent successful CV on 10/6/2023.     In 11/2023 he presented with severe dyspnea and bilateral pneumonitis, suspicious for amiodarone lung toxicity. The medication was stopped. He has been treated with a prednisone taper. His respiratory status has improved. He is followed by pulmonary.    Today the patient states that he has gained about 20 pounds in the past two weeks or so. He believes he has been out of rhythm during this time. He says that every time he develops AF he tends to retain fluid. He has not drank alcohol recently very    He lives in [place]. Non-smoker.      DIAGNOSTIC STUDIES:  Labs: sodium 139, potassium 3.7, creatinine 1.17, magnesium 1.7, troponin T 57 - 59, white count 12.7, hematocrit 41.2%  12-lead ECG: AF with RVR  Echocardiogram:  I personally reviewed his echocardiogram from yesterday. LVEF is normal, mild MR, moderate aortic stenosis, min grading 34 mmHg, MARILYN 1 cm .      IMPRESSION:  Acute on chronic diastolic  heart failure, likely triggered by AF. Possible contribution from aortic stenosis.  Moderate aortic stenosis, mean gradient 34 mmHg, MARILYN 1.0 cm2.  Persistent atrial fibrillation.    Recent amiodarone lung toxicity, currently recovering.  BOWEN, on CPAP.  Obesity.    RECOMMENDATIONS:    I agree with IV diuresis.  At home he will need a higher than his previous maintenance diuretic dose.  Add metoprolol tartrate 25 mg BID.  Follow-up with ESTEVAN Francois on an outpatient basis to discuss long-term management of AF. Of note, the patient's QTc is about 480 ms .  He is not a good candidate for a class III AA drug and he already failed flecainide in the past.  Follow-up TBA with the structural cardiac team for an opinion regarding his aortic valve and possible contribution to acute CHF.    I appreciate the opportunity to be part of this patient's care.  Please feel free to call me with any questions.    High complexity medical decision-making.       Migdalia Rich MD, [facility]        PHYSICAL EXAM:  Vitals: /79 (BP Location: Right arm)   Pulse 62   Temp 97.4  F (36.3  C) (Oral)   Resp 18   Wt 130.5 kg (287 lb 11.2 oz)   SpO2 94%   BMI 43.74 kg/m      Intake/Output Summary (Last 24 hours) at 2/12/2024 0904  Last data filed at 2/12/2024 0700  Gross per 24 hour   Intake 400 ml   Output 5345 ml   Net -4945 ml     Vitals:    02/11/24 0140 02/11/24 0446 02/12/24 0602   Weight: 133.8 kg (295 lb) 135.6 kg (298 lb 14.4 oz) 130.5 kg (287 lb 11.2 oz)     Constitutional:  Alert and oriented x3. Very pleasant gentleman, significantly overweight, in no respiratory distress.  Head: Normocephalic and atraumatic.   Skin:  Normal color and texture.  No rashes, lesions or eruptions.  Eyes:  no jaundice, EOMI.  ENT:  Supple, normal JVP, no apparent thyroid enlargement.  Lymph:  No lymphadenopathy   Chest/Lungs:  Clear bilaterally, no rales or wheezing.  Cardiac:  Irregular rhythm, normal S1 and S2.  No murmur, rub or gallop.    GI:   Normal bowel sounds. Abdomen is obese soft, non-tender & not distended.  No rebound or guarding.    Extremities:  Radial pulses 2+ bilaterally. Trace lower extremity edema is present.   Neurological:  CN 2-12 grossly intact.  Strength in UE is normal & symmetric.    Back:  No CVA tenderness.     REVIEW OF SYSTEMS:  A complete review of system was performed and was negative with the exception of what was described in the HPI section.     CURRENT MEDICATIONS:   allopurinol  300 mg Oral Daily    amLODIPine  5 mg Oral Daily    finasteride  1 mg Oral Daily    furosemide  60 mg Intravenous Q8H    losartan  100 mg Oral Daily    potassium chloride ER  20 mEq Oral Daily    predniSONE  20 mg Oral Daily    rivaroxaban ANTICOAGULANT  20 mg Oral Daily with supper    sodium chloride (PF)  3 mL Intracatheter Q8H    sulfamethoxazole-trimethoprim  1 tablet Oral Daily       ALLERGIES     Allergies   Allergen Reactions    Amiodarone Shortness Of Breath     Suspected amiodarone toxicity involving lungs       PAST MEDICAL HISTORY:  Past Medical History:   Diagnosis Date    (HFpEF) heart failure with preserved ejection fraction (H)     Acute gouty arthritis     Alcohol use disorder in remission     Amiodarone pulmonary toxicity     Aortic stenosis     Basal cell carcinoma     Of the nose    Bell's palsy     Herpes simplex without mention of complication     Hyperlipidemia     Hypertension 2000    Persistent atrial fibrillation (H)     persistent, DCCV 12/2017, ablation 11/6/17    Pulmonary nodules     Sleep apnea     CPAP       PAST SURGICAL HISTORY:  Past Surgical History:   Procedure Laterality Date    ABLATION OF DYSRHYTHMIC FOCUS  2018, 04/12/2019    Procedure: EP Ablation Focal AFIB;  Surgeon: Fady Day MD;  Location:  HEART CARDIAC CATH LAB    ACHILLES TENDON SURGERY  1997    ANESTHESIA CARDIOVERSION N/A 2/26/2019    Procedure: ANESTHESIA CARDIOVERSION (CONNER);  Surgeon: GENERIC ANESTHESIA PROVIDER;  Location:  OR     ANESTHESIA CARDIOVERSION N/A 11/19/2020    Procedure: ANESTHESIA, FOR CARDIOVERSION (dr campbell);  Surgeon: GENERIC ANESTHESIA PROVIDER;  Location:  OR    ANESTHESIA CARDIOVERSION N/A 8/22/2022    Procedure: CARDIOVERSION;  Surgeon: GENERIC ANESTHESIA PROVIDER;  Location:  OR    ANESTHESIA CARDIOVERSION N/A 9/13/2023    Procedure: Anesthesia cardioversion;  Surgeon: GENERIC ANESTHESIA PROVIDER;  Location:  OR    ANESTHESIA CARDIOVERSION N/A 10/6/2023    Procedure: Anesthesia cardioversion;  Surgeon: GENERIC ANESTHESIA PROVIDER;  Location:  OR    BASAL CELL CARCINOMA EXCISION Right 2009    nose, took cartilage from inner ear.    CARDIOVERSION  2012, 2019    cardioversion for atr fib    COLONOSCOPY  2008    COLONOSCOPY N/A 3/22/2023    Procedure: COLONOSCOPY, FLEXIBLE, WITH LESION REMOVAL USING SNARE;  Surgeon: Anne Lucero MD;  Location:  GI    EP ABLATION FOCAL AFIB N/A 4/12/2019    Procedure: EP Ablation Focal AFIB;  Surgeon: Fady Day MD;  Location:  HEART CARDIAC CATH LAB    HERNIA REPAIR, UMBILICAL  08/20/2012    Procedure: HERNIORRHAPHY UMBILICAL;  UMBILICAL HERNIA REPAIR;  Surgeon: Ra Crocker MD;  Location: [facility]    HERNIORRHAPHY UMBILICAL  8/20/2012    Procedure: HERNIORRHAPHY UMBILICAL;  UMBILICAL HERNIA REPAIR;  Surgeon: Ra Crocker MD;  Location: [facility]    ORTHOPEDIC SURGERY      ORTHOPEDIC SURGERY      OTHER SURGICAL HISTORY Right 03/2017    total KNEE ARTHROSCOPY     TOTAL KNEE ARTHROPLASTY Left 03/16/2018    ZZC NONSPECIFIC PROCEDURE  1997    achilles tendon    ZZC NONSPECIFIC PROCEDURE      knees, arthroscopy    ZZC NONSPECIFIC PROCEDURE      basal cell skin ca, nose    ZZC NONSPECIFIC PROCEDURE      flex sig; colonoscopy due 3/2008    ZZC NONSPECIFIC PROCEDURE  2012    cardioversion for atr fib    ZZC TOTAL KNEE ARTHROPLASTY Left 03/16/2018    Dr. Malick Suarez       FAMILY HISTORY:  Family History   Problem Relation Age of Onset    Family History Negative  Mother     Heart Disease Father         decesased at 42 - MI    Heart Disease Sister          MI    Lipids Sister     Lipids Sister     No Known Problems Sister     Heart Disease Brother          MI    Heart Disease Brother         CABG AT 49    Lung Cancer No family hx of        SOCIAL HISTORY:  Social History     Socioeconomic History    Marital status: Single   Tobacco Use    Smoking status: Former     Packs/day: 1.00     Years: 10.00     Additional pack years: 0.00     Total pack years: 10.00     Types: Cigarettes     Start date:      Quit date: 2011     Years since quittin.4    Smokeless tobacco: Never    Tobacco comments:     quit    Vaping Use    Vaping Use: Never used   Substance and Sexual Activity    Alcohol use: Not Currently    Drug use: Never    Sexual activity: Not Currently     Partners: Female     Birth control/protection: None   Social History Narrative    Worked in retail () and insurance sales     Spends some time on [place] on [place]         Recent Lab Results:  Recent Labs   Lab 24  0848 24  0220   WBC 12.7* 13.4*   HGB 13.5 13.4   MCV 99 100    240    139   POTASSIUM 3.7 3.7   CHLORIDE 100 99   CO2 30* 25   BUN 26.1* 28.5*   CR 1.17 1.25*   ANIONGAP 9 15   ASHELY 8.7* 9.0   * 112*   ALBUMIN  --  3.9   PROTTOTAL  --  6.5   BILITOTAL  --  0.3   ALKPHOS  --  53   ALT  --  37   AST  --  28

## 2024-02-12 NOTE — PROGRESS NOTES
Occupational Therapy Discharge Summary    Reason for therapy discharge:    All goals and outcomes met, no further needs identified.    Progress towards therapy goal(s). See goals on Care Plan in [facility] electronic health record for goal details.  Goals met    Therapy recommendation(s):    No further therapy is recommended.

## 2024-02-12 NOTE — PLAN OF CARE
Summary: Acute/chronic CHF exacerbation. AFib with RVR   DATE & TIME: 02/11/2024 3419-5946  Cognitive Concerns/ Orientation : Alert/Oriented x 4   BEHAVIOR & AGGRESSION TOOL COLOR: Green   ABNL VS/O2: VSS, O2 low 90% on RA, DELEON. Clear lung sounds.  MOBILITY: Independent, steady  PAIN MANAGMENT: Denies  DIET: 2 gram sodium, NPO at midnight   BOWEL/BLADDER: Continent, Strict I/Os-using urinal + hat in toilet  ABNL LAB/BG: K+/Mg+ replacement  DRAIN/DEVICES: R PIV saline locked  TELEMETRY RHYTHM: Afib CVR with occasional PVC   SKIN: Large scab to lateral R shin, open to air, no redness noted. BLE karine, +3 pitting edema, elevated on pillows.   TESTS/PROCEDURES: EP today  D/C DATE: Pending cardiology clearance.   OTHER IMPORTANT INFO: Cardiology consult. NPO at midnight until seen by EP 2/12 in case they plan for any procedure. Clarified with cardiology, okayed to administer PTA Xarelto. OT recommended home with assist.

## 2024-02-12 NOTE — CONSULTS
Care Management Initial Consult    General Information  Assessment completed with: Brayden Hubbard  Type of CM/SW Visit: Initial Assessment  Primary Care Provider verified and updated as needed: Yes (Dr. Courtney at [facility])   Readmission within the last 30 days: no previous admission in last 30 days      Reason for Consult: discharge planning  Advance Care Planning:    no ACP documents on file in EPIC; pt and significant other both received blank copy of health care directive & instructions for completing/send to [email or web address] when done    Communication Assessment  Patient's communication style: spoken language (English or Bilingual)    Hearing Difficulty or Deaf: no   Wear Glasses or Blind: no    Cognitive  Cognitive/Neuro/Behavioral: WDL                      Living Environment:   People in home: significant other  Katty  [phone]  Current living Arrangements: house ([address] [place] [address])      Able to return to prior arrangements: yes  Living Arrangement Comments: (per PT note, house has Full flight down to basement to bathroom, full flight upstairs to office.)    Family/Social Support:  Care provided by: self  Provides care for: no one  Marital Status: Lives with Significant Other  Support system; Significant Other       Katty  Description of Support System: Supportive, Involved    Support Assessment: Adequate family and caregiver support    Current Resources:   Patient receiving home care services:  no  Community Resources:  none  Equipment currently used at home: none  Supplies currently used at home:  none    Employment/Financial:  Employment Status: retired     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  Yes   Which insurance plan  "3 day waiver is available? Alternative insurance waiver  Will the waiver be used for post-acute placement? No    Lifestyle & Psychosocial Needs:  Social Determinants of Health     Food Insecurity: Not on file   Depression: Not at risk (7/27/2023)    PHQ-2     PHQ-2 Score: 0   Housing Stability: Not on file   Tobacco Use: Medium Risk (12/18/2023)    Patient History     Smoking Tobacco Use: Former     Smokeless Tobacco Use: Never     Passive Exposure: Not on file   Financial Resource Strain: Not on file   Alcohol Use: Not At Risk (3/12/2019)    AUDIT-C     Frequency of Alcohol Consumption: Never     Average Number of Drinks: Not on file     Frequency of Binge Drinking: Not on file   Transportation Needs: Not on file   Physical Activity: Not on file   Interpersonal Safety: Not on file   Stress: Not on file   Social Connections: Not on file     Functional Status:  Prior to admission patient needed assistance:   Dependent ADLs:: Independent  Dependent IADLs:: Independent    Mental Health Status:  Mental Health Status: No Current Concerns       Chemical Dependency Status:  Chemical Dependency Status: No Current Concerns       Values/Beliefs:  Spiritual, Cultural Beliefs, Jewish Practices, Values that affect care: no         Additional Information:  One consult is for \"CHF teaching.\"  Teaching to be done by bedside nurse; student will bring in CHF booklet and review with pt.  Met with patient in room, introduced self and role in discharge planning. Confirmed the information in the above assessment, please see each section for helpful details.     Pt lives independently in house with significant other, has a scale and knew his weight was going up, didn't realize how much.  He looks forward to the CHF teaching by nursing and has the \"stoplight\" tool in his room.  He is active in the [facility] sarath and is aware of his upcoming appointments.  No additional CM needs.      Appointments are on AVS:    Feb 23, 2024  7:30 " AM  Return EP with Natalie Campos PA-C  [facility] [place]  [address]  [facility] [address] [phone]      Feb 23, 2024  9:30 AM  (Arrive by 9:15 AM)  ED/Hospital Follow Up with Ranjan Courtney MD  [facility]  [address] [id number] [phone]      Mar 07, 2024  9:00 AM (Arrive by 8:45 AM)  Full PFT with RH PULMONARY FUNCTION  [facility] [address]  [facility] [id number] [phone]      Mar 11, 2024  9:00 AM  (Arrive by 8:45 AM)  CT CHEST W/O CONTRAST with RSCCCT1  [facility]  [address]  [facility] [address] [phone]     Mar 11, 2024  9:30 AM  Return Patient with Jeanette Mckeon MD  [facility] [address]  [address]  [facility] [address] [phone]      Jun 13, 2024  1:45 PM  ENT Audio with Marie Dexter  [facility] [address] [address] [phone]      Jun 13, 2024  2:45 PM  (Arrive by 2:30 PM)  RETURN EAR with Luis Manuel Chi MD  [address] [address] [phone]       Melanie Carrion RN, BSN, PHN  [facility]  Inpatient Care Management - FLOAT  66 CM RN Mobile: [phone] daily 7:30-4:00

## 2024-02-12 NOTE — PLAN OF CARE
Summary: Admitted 2/11 for acute/chronic CHF exacerbation. AFib with RVR   Hx: AFIB, PVI & SVC ablation in 2017 & 2019  DATE & TIME: 02/12/2024 7823-8503  Cognitive Concerns/ Orientation: A/Ox4  BEHAVIOR & AGGRESSION TOOL COLOR: Green   ABNL VS/O2: VSS, O2 low 90% on RA  MOBILITY: Independent, steady  PAIN MANAGMENT: Denies  DIET: 2 gram sodium. Good appetite.   BOWEL/BLADDER: Continent, Strict I/Os-using urinal + hat in toilet  ABNL LAB/BG: K 3.4, Mag 2.0  DRAIN/DEVICES: R PIV SL  TELEMETRY RHYTHM: Afib CVR with occasional PVC   SKIN: Large scab to lateral R shin, open to air, no redness noted. BLE karine, +2 pitting edema, elevated on pillows.   TESTS/PROCEDURES: EP today  D/C DATE: Likely discharge tomorrow after diuresis & follow up outpatient with Cardiology

## 2024-02-12 NOTE — PROGRESS NOTES
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     PLAN      Acute diastolic CHF exacerbation and recurrent atrial fibrillation with RVR: Of note, Mr. Schultz is followed closely by [facility] for chronic paroxysmal atrial fibrillation and chronic HFPEF. It seems that afib was diagnosed in 2012. Flecanide was trialed until it had to be stopped for treatment failure (also a cardiac MRI noted a scar). He underwent PVI and SVC isolation in 11/2017. Repeat PVI and CTI were done for inducible Atrial Flutter in 4/2019. He underwent DCCV on 11/19/2020 for recurrent AFib, at that time in the setting of alcohol use.  From that time Amiodarone was used intermittently until recently. He had repeat DCCV in 8/22/2022, again (unsuccessfully) he had DCCV on 9/13/23, and was re-loaded with Amiodarone at that time, and then he underwent successful DCCV on 10/6/2023.       Most recently he was subsequently admitted here from 11/25/23 through 12/1/2023 for dyspnea, and found on Chest CT to have findings of widespread pneumonitis, consistent with amiodarone-induced lung toxicity. Therefore that was stopped. Limited TTE showed slight improvement in LV function compared with TTE from 9/2023. Bubble study was negative. Pulmonology and Cardiology were consulted. 60 mg Prednisone was started with prolonged taper over 3 months ordered. That was  continued at discharge. In follow up with Pulmonology 12/18/23, PFT's showed mild restriction. Repeat CT showed substantial improvement in his pneumonitis. Zio patch was negative for recurrent afib. It has been recommended to consider repeat AFib ablation if it recurs in the absence of ETOH use.     Now, he presents for recurrent exertional dyspnea associated with progressive anasarca and reported weight gain over 20 pounds in the past two weeks.     -Appreciate cardiology consult.  Continue on IV diuresis today.  Discussed with Dr. Rich and no plan for repeat procedure.  Continue on metoprolol for rate control and Xarelto.  -If remains stable likely discharge tomorrow     Recurrent afib with RVR: Will order PRN IV Metoprolol for now in case of recurrent, sustained RVR. ct Xarelto     Accelerated hypertension: ct home Cozaar, amlodipine   Ongoing leukocytosis due to Prednisone taper: For Prednisone induced pneumonitis. Currently down to 20 mg PO daily     Pulmonary nodules: Also assessed by recent CT imaging and found to have improved in addition to the pneumonitis. Follow up with  Pulmonology as an outpatient as scheduled     BOWEN: Adherent to CPAP, continued     Gout: Resume home Allopurinol         Diet: 2 Gram Sodium Diet    DVT Prophylaxis: DOAC  Palmer Catheter: Not present  Lines: None     Cardiac Monitoring: ACTIVE order. Indication: Acute decompensated heart failure (48 hours)  Code Status: Full Code      Clinically Significant Risk Factors                  # Hypertension: Noted on problem list                   Disposition Plan      Expected Discharge Date: 02/13/2024      Destination: home with family              Isael Milner MD  Hospitalist Service    Securely message with [employer] (more info)  Text page via [employer] Paging/Directory   The above note was dictated using voice recognition software. Although reviewed after completion, some word and grammatical error may  remain . Please contact the author for any clarifications.  ______________________________________________________________________    Interval History   {Feels better.  Diuresing well  Denies any chest pain/palpitation  Physical Exam   /80 (BP Location: Right arm)   Pulse 70   Temp 97.4  F (36.3  C) (Oral)   Resp 18   Wt 130.5 kg (287 lb 11.2 oz)   SpO2 90%   BMI 43.74 kg/m    Gen- pleasant   Neck- supple  CVS- I+II+ no m/r/g  RS- CTAB, basilar crackles  Abdo- soft, no tenderness . No g/r/r   Ext-edema     Medical Decision Making       51 MINUTES SPENT BY ME on the date of service doing chart review, history, exam, documentation & further activities per the note.      Data   ------------------------- PAST 24 HR DATA REVIEWED -----------------------------------------------    I have personally reviewed the following data over the past 24 hrs:    N/A  \   N/A   / N/A     140 98 25.3 (H) /  111 (H)   3.4 32 (H) 1.23 (H) \       Imaging results reviewed over the past 24 hrs:   No results found for this or any previous visit (from the past 24 hour(s)).

## 2024-02-13 VITALS
DIASTOLIC BLOOD PRESSURE: 68 MMHG | RESPIRATION RATE: 17 BRPM | SYSTOLIC BLOOD PRESSURE: 104 MMHG | HEART RATE: 71 BPM | TEMPERATURE: 98 F | WEIGHT: 285 LBS | OXYGEN SATURATION: 93 % | BODY MASS INDEX: 43.33 KG/M2

## 2024-02-13 LAB
ANION GAP SERPL CALCULATED.3IONS-SCNC: 9 MMOL/L (ref 7–15)
BUN SERPL-MCNC: 29.5 MG/DL (ref 8–23)
CALCIUM SERPL-MCNC: 8.9 MG/DL (ref 8.8–10.2)
CHLORIDE SERPL-SCNC: 98 MMOL/L (ref 98–107)
CREAT SERPL-MCNC: 1.31 MG/DL (ref 0.67–1.17)
DEPRECATED HCO3 PLAS-SCNC: 32 MMOL/L (ref 22–29)
EGFRCR SERPLBLD CKD-EPI 2021: 58 ML/MIN/1.73M2
ERYTHROCYTE [DISTWIDTH] IN BLOOD BY AUTOMATED COUNT: 14.9 % (ref 10–15)
GLUCOSE SERPL-MCNC: 110 MG/DL (ref 70–99)
HCT VFR BLD AUTO: 45.6 % (ref 40–53)
HGB BLD-MCNC: 15.2 G/DL (ref 13.3–17.7)
MAGNESIUM SERPL-MCNC: 2.2 MG/DL (ref 1.7–2.3)
MCH RBC QN AUTO: 32.8 PG (ref 26.5–33)
MCHC RBC AUTO-ENTMCNC: 33.3 G/DL (ref 31.5–36.5)
MCV RBC AUTO: 98 FL (ref 78–100)
PLATELET # BLD AUTO: 249 10E3/UL (ref 150–450)
POTASSIUM SERPL-SCNC: 3.8 MMOL/L (ref 3.4–5.3)
RBC # BLD AUTO: 4.64 10E6/UL (ref 4.4–5.9)
SODIUM SERPL-SCNC: 139 MMOL/L (ref 135–145)
WBC # BLD AUTO: 12.9 10E3/UL (ref 4–11)

## 2024-02-13 PROCEDURE — 250N000013 HC RX MED GY IP 250 OP 250 PS 637: Performed by: INTERNAL MEDICINE

## 2024-02-13 PROCEDURE — 99232 SBSQ HOSP IP/OBS MODERATE 35: CPT | Performed by: INTERNAL MEDICINE

## 2024-02-13 PROCEDURE — 36415 COLL VENOUS BLD VENIPUNCTURE: CPT | Performed by: INTERNAL MEDICINE

## 2024-02-13 PROCEDURE — 99239 HOSP IP/OBS DSCHRG MGMT >30: CPT | Performed by: INTERNAL MEDICINE

## 2024-02-13 PROCEDURE — 80048 BASIC METABOLIC PNL TOTAL CA: CPT | Performed by: INTERNAL MEDICINE

## 2024-02-13 PROCEDURE — 83735 ASSAY OF MAGNESIUM: CPT | Performed by: INTERNAL MEDICINE

## 2024-02-13 PROCEDURE — 85027 COMPLETE CBC AUTOMATED: CPT | Performed by: INTERNAL MEDICINE

## 2024-02-13 PROCEDURE — 250N000011 HC RX IP 250 OP 636: Performed by: HOSPITALIST

## 2024-02-13 PROCEDURE — 250N000012 HC RX MED GY IP 250 OP 636 PS 637: Performed by: INTERNAL MEDICINE

## 2024-02-13 RX ORDER — FUROSEMIDE 80 MG
80 TABLET ORAL DAILY
Qty: 30 TABLET | Refills: 0 | Status: SHIPPED | OUTPATIENT
Start: 2024-02-14 | End: 2024-02-13

## 2024-02-13 RX ORDER — POTASSIUM CHLORIDE 1500 MG/1
20 TABLET, EXTENDED RELEASE ORAL DAILY
Status: DISCONTINUED | OUTPATIENT
Start: 2024-02-13 | End: 2024-02-13

## 2024-02-13 RX ORDER — FUROSEMIDE 40 MG
40 TABLET ORAL EVERY EVENING
Qty: 30 TABLET | Refills: 0 | Status: SHIPPED | OUTPATIENT
Start: 2024-02-14 | End: 2024-02-23

## 2024-02-13 RX ORDER — FUROSEMIDE 40 MG
40 TABLET ORAL
Status: DISCONTINUED | OUTPATIENT
Start: 2024-02-14 | End: 2024-02-13 | Stop reason: HOSPADM

## 2024-02-13 RX ORDER — METOPROLOL TARTRATE 25 MG/1
25 TABLET, FILM COATED ORAL 2 TIMES DAILY
Qty: 60 TABLET | Refills: 0 | Status: SHIPPED | OUTPATIENT
Start: 2024-02-13 | End: 2024-03-08

## 2024-02-13 RX ORDER — FUROSEMIDE 40 MG
80 TABLET ORAL DAILY
Status: DISCONTINUED | OUTPATIENT
Start: 2024-02-14 | End: 2024-02-13

## 2024-02-13 RX ORDER — FUROSEMIDE 80 MG
40 TABLET ORAL DAILY
Qty: 30 TABLET | Refills: 0 | Status: SHIPPED | OUTPATIENT
Start: 2024-02-14 | End: 2024-02-23

## 2024-02-13 RX ORDER — FUROSEMIDE 40 MG
40 TABLET ORAL EVERY EVENING
Status: DISCONTINUED | OUTPATIENT
Start: 2024-02-14 | End: 2024-02-13

## 2024-02-13 RX ADMIN — ALLOPURINOL 300 MG: 300 TABLET ORAL at 08:48

## 2024-02-13 RX ADMIN — AMLODIPINE BESYLATE 5 MG: 5 TABLET ORAL at 08:48

## 2024-02-13 RX ADMIN — POTASSIUM CHLORIDE 20 MEQ: 1500 TABLET, EXTENDED RELEASE ORAL at 08:48

## 2024-02-13 RX ADMIN — PREDNISONE 20 MG: 20 TABLET ORAL at 08:47

## 2024-02-13 RX ADMIN — FINASTERIDE 1 MG: 1 TABLET, FILM COATED ORAL at 08:47

## 2024-02-13 RX ADMIN — FUROSEMIDE 60 MG: 10 INJECTION, SOLUTION INTRAMUSCULAR; INTRAVENOUS at 04:36

## 2024-02-13 RX ADMIN — LOSARTAN POTASSIUM 100 MG: 100 TABLET, FILM COATED ORAL at 08:47

## 2024-02-13 RX ADMIN — METOPROLOL TARTRATE 25 MG: 25 TABLET, FILM COATED ORAL at 08:46

## 2024-02-13 RX ADMIN — SULFAMETHOXAZOLE AND TRIMETHOPRIM 1 TABLET: 800; 160 TABLET ORAL at 08:46

## 2024-02-13 ASSESSMENT — ACTIVITIES OF DAILY LIVING (ADL)
ADLS_ACUITY_SCORE: 24
ADLS_ACUITY_SCORE: 22

## 2024-02-13 NOTE — PLAN OF CARE
Goal Outcome Evaluation:      Plan of Care Reviewed With: patient    Overall Patient Progress: improvingOverall Patient Progress: improving     Summary: Admitted 2/11 for acute/chronic CHF exacerbation. AFib with RVR   Hx: AFIB, PVI & SVC ablation in 2017 & 2019  DATE & TIME: 02/13/24 7583-1469  Cognitive Concerns/ Orientation: A/Ox4  BEHAVIOR & AGGRESSION TOOL COLOR: Green   ABNL VS/O2: VSS on RA  MOBILITY: Independent  PAIN MANAGMENT: Denies  DIET: 2 gram sodium, good appetite.   BOWEL/BLADDER: Continent, Strict I/Os-using urinal (over 1.4L out during shift)  ABNL LAB/BG: K 3.8. Mag 2.2 . Creat 1.31  DRAIN/DEVICES: R PIV SL  TELEMETRY RHYTHM: Afib CVR with occasional PVC   SKIN: Large scab to lateral R shin, open to air, no redness noted. BLE karine, +2 pitting edema, elevation encouraged. Compression stockings applied.  TESTS/PROCEDURES: none today  D/C DATE: discharge today, follow up outpatient with Cardiology and CORE

## 2024-02-13 NOTE — PROGRESS NOTES
[facility] CARDIOLOG[facility] PROGRESS NOTE  Krystal Rich MD (pager [phone])    73-year-old male with long history of atrial fibrillation, 2 prior EP procedures (2017 & 2019), failure of flecainide, pulmonary toxicity on amiodarone who presents with acute diastolic CHF triggered by recurrent persistent atrial fibrillation.    He has been diuresed well. He has lost 10 - 12 pounds since admission. Creatinine is now increasing.     Metoprolol was added. Remains out of rhythm but rate is controlled, 70s at rest.  -----------------------------------------------------------------    ASSESSMENT:  Acute on chronic diastolic heart failure, likely triggered by AF. Possible contribution from aortic stenosis. Has diuresed well, creatinine is now rising, still has mild edema. Stop IV Lasix.  Moderate aortic stenosis, mean gradient 34 mmHg, MARILYN 1.0 cm2.  Persistent atrial fibrillation.    Recent amiodarone lung toxicity, currently recovering.  BOWEN, on CPAP.  Obesity.    PLAN:  Switch IV furosemide to oral furosemide.  PTA he was receiving furosemide 40 mg in the morning. Add furosemide 40 mg in the p.m. and KCl 20 mEq daily. Oral diuretics to be started tomorrow.  Daily weights at home. The patient was asked to call the clinic if his weight was up more than 3-4 pounds.    Appointments will be made with Dr. Day to discuss AF management options and the cardiac structural team for aortic valve assessment.  He will keep his appointment with TOBI Rivas next week. BMP will be added.    Total Time: 42 min;   25 minutes spent in direct communication with patient / family and care coordination.             Interval History:     doing well; no cp, sob, n/v/d, or abd pain.          Review of Systems:     CONSTITUTIONAL: NEGATIVE for fever, chills, change in weight  ENT/MOUTH: NEGATIVE for ear, mouth and throat problems  RESP: NEGATIVE for significant cough or SOB  CV: NEGATIVE for chest pain, palpitations or peripheral edema          Physical  Exam:      Blood pressure 118/73, pulse 66, temperature 98.4  F (36.9  C), temperature source Oral, resp. rate 17, weight 129.3 kg (285 lb), SpO2 92%.  Vitals:    02/11/24 0446 02/12/24 0602 02/13/24 0953   Weight: 135.6 kg (298 lb 14.4 oz) 130.5 kg (287 lb 11.2 oz) 129.3 kg (285 lb)     Vital Signs with Ranges  Temp:  [98  F (36.7  C)-98.4  F (36.9  C)] 98.4  F (36.9  C)  Pulse:  [58-70] 66  Resp:  [16-19] 17  BP: ()/(70-80) 118/73  SpO2:  [90 %-95 %] 92 %  I/O's Last 24 hours  I/O last 3 completed shifts:  In: 1240 [P.O.:1240]  Out: 3480 [Urine:3480]    GENERAL: alert and oriented, very pleasant  HEENT:  normocephalic, atraumatic.  NECK:  normal JVP.  LUNGS:  clear bilaterally, no wheezes  CARDIOVASCULAR: irregular rhythm with rate control, no gallop, murmur or rub  ABDOMEN: obese, soft, NT  EXTREMITIES:  no edema  VASCULAR:  2+ carotids, 2+ radial pulses           Medications:         allopurinol  300 mg Oral Daily    amLODIPine  5 mg Oral Daily    finasteride  1 mg Oral Daily    furosemide  60 mg Intravenous Q8H    losartan  100 mg Oral Daily    metoprolol tartrate  25 mg Oral BID    potassium chloride ER  20 mEq Oral Daily    predniSONE  20 mg Oral Daily    rivaroxaban ANTICOAGULANT  20 mg Oral Daily with supper    sodium chloride (PF)  3 mL Intracatheter Q8H    sulfamethoxazole-trimethoprim  1 tablet Oral Daily            Data:      All new lab and imaging data was reviewed.   Recent Labs   Lab Test 02/13/24  0833 02/11/24  0848 02/11/24  0220 11/27/23  0648 11/26/23  0455 11/25/23  2302 04/12/19  0707 02/26/19  0917   WBC 12.9* 12.7* 13.4*   < >  --   --    < >  --    HGB 15.2 13.5 13.4   < >  --   --    < >  --    MCV 98 99 100   < >  --   --    < >  --     227 240   < >  --   --    < >  --    INR  --   --   --   --  2.10* 2.62*  --  2.04*    < > = values in this interval not displayed.      Recent Labs   Lab Test 02/13/24  0833 02/12/24  2126 02/12/24  0840 02/11/24  0848     --  140 139  "  POTASSIUM 3.8 3.6 3.4 3.7   CHLORIDE 98  --  98 100   CO2 32*  --  32* 30*   BUN 29.5*  --  25.3* 26.1*   CR 1.31*  --  1.23* 1.17   ANIONGAP 9  --  10 9   ASHELY 8.9  --  8.9 8.7*   *  --  111* 103*     No lab results found.    Invalid input(s): \"TROP\", \"TROPONINIES\"      EKG results:  Reviewed        "

## 2024-02-13 NOTE — PLAN OF CARE
Summary: Admitted 2/11 for acute/chronic CHF exacerbation. AFib with RVR   Hx: AFIB, PVI & SVC ablation in 2017 & 2019  DATE & TIME: 02/12-02/13/24 Night shift  Cognitive Concerns/ Orientation: A/Ox4  BEHAVIOR & AGGRESSION TOOL COLOR: Green   ABNL VS/O2: VSS on RA  MOBILITY: Independent  PAIN MANAGMENT: Denies  DIET: 2 gram sodium, good appetite.   BOWEL/BLADDER: Continent, Strict I/Os-using urinal (over 1.4L out during shift)  ABNL LAB/BG: K 3.6. Mag 2.0 replaced orally recheck this AM  DRAIN/DEVICES: R PIV SL  TELEMETRY RHYTHM: Afib CVR with occasional PVC   SKIN: Large scab to lateral R shin, open to air, no redness noted. BLE karine, +2 pitting edema, elevation encouraged  TESTS/PROCEDURES: none today  D/C DATE: Likely discharge today, follow up outpatient with Cardiology and CORE

## 2024-02-13 NOTE — PLAN OF CARE
Goal Outcome Evaluation:    Summary: Admitted 2/11 for acute/chronic CHF exacerbation. AFib with RVR   Hx: AFIB, PVI & SVC ablation in 2017 & 2019  DATE & TIME: 02/12/2024 0090-2289  Cognitive Concerns/ Orientation: A/Ox4  BEHAVIOR & AGGRESSION TOOL COLOR: Green   ABNL VS/O2: VSS on room air  MOBILITY: Independent, steady  PAIN MANAGMENT: Denies  DIET: 2 gram sodium, good appetite.   BOWEL/BLADDER: Continent, Strict I/Os-using urinal   ABNL LAB/BG: K 3.4 replaced orally recheck 3.6. Mag 2.0 replaced orally recheck in AM  DRAIN/DEVICES: R PIV SL  TELEMETRY RHYTHM: Afib CVR with occasional PVC   SKIN: Large scab to lateral R shin, open to air, no redness noted. BLE karine, +3 pitting edema, elevation encouraged  TESTS/PROCEDURES: none today  D/C DATE: Likely discharge tomorrow, follow up outpatient with Cardiology

## 2024-02-13 NOTE — PLAN OF CARE
Goal Outcome Evaluation:      Plan of Care Reviewed With: patient    Overall Patient Progress: improvingOverall Patient Progress: improving    Discharge    Patient discharged to home via [employer]  Care plan note    Overall Patient Progress: improvingOverall Patient Progress: improving     Summary: Admitted 2/11 for acute/chronic CHF exacerbation. AFib with RVR   Hx: AFIB, PVI & SVC ablation in 2017 & 2019    DATE & TIME: 02/13/24 2597-5078  Cognitive Concerns/ Orientation: A/Ox4  BEHAVIOR & AGGRESSION TOOL COLOR: Green             ABNL VS/O2: VSS on RA  MOBILITY: Independent  PAIN MANAGMENT: Denies  DIET: 2 gram sodium, good appetite.   BOWEL/BLADDER: Continent, Strict I/Os-using urinal (over 1.4L out during shift)  ABNL LAB/BG: K 3.8. Mag 2.2 . Creat 1.31  DRAIN/DEVICES: R PIV, removed prior to discharge   TELEMETRY RHYTHM: Afib CVR with occasional PVC   SKIN: Large scab to lateral R shin, open to air, no redness noted. BLE karine, +2 pitting edema, elevation encouraged. Compression stockings applied.  TESTS/PROCEDURES: none today  D/C DATE: discharged home today 2/13/24 at 1725, Went through AVS and discharge home medication information, pt verbalized understanding. Belongings (home medication) returned to the pt. Reviewed CHF education. No question and concerns at the time of discharge. Following up outpatient with Cardiology and CORE.     Listed belongings gathered and given to patient (including from security/pharmacy). Yes  Care Plan and Patient education resolved: Yes  Prescriptions if needed, hard copies sent with patient  NA  Medication Bin checked and emptied on discharge Yes  SW/care coordinator/charge RN aware of discharge: Yes

## 2024-02-13 NOTE — DISCHARGE SUMMARY
[facility]  Hospitalist Discharge Summary      Date of Admission:  2/11/2024  Date of Discharge:  2/13/2024  Discharging Provider: Isael Milner MD  Discharge Service: Hospitalist Service    Discharge Diagnoses     Acute diastolic CHF exacerbation and recurrent atrial fibrillation with RVR:    Recurrent afib with RVR:   Accelerated hypertension:   Ongoing leukocytosis due to Prednisone taper:   Pulmonary nodules:   BOWEN:    Gout:   Clinically Significant Risk Factors          Follow-ups Needed After Discharge   Follow-up Appointments     Follow-up and recommended labs and tests       Follow up with primary care provider, Ranjan Courtney, within 7 days for   hospital follow- up.  The following labs/tests are recommended: BMP.  -Patient diuretics were adjusted  -Metformin has been discontinued for now.  Follow-up kidney function and   resume as appropriate    Follow up with cardiology as recommended        { -Patient was started on metoprolol.  His spironolactone was discontinued and Lasix dose was increased.  Please review in the follow-up appointment for GDMT    Unresulted Labs Ordered in the Past 30 Days of this Admission       No orders found from 1/12/2024 to 2/12/2024.            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     PLAN      Acute diastolic CHF exacerbation and recurrent atrial fibrillation with RVR:   Recurrent afib with RVR:    Of note, Mr. Schultz is followed closely by [facility] for chronic paroxysmal atrial fibrillation and chronic HFPEF. It seems that afib was diagnosed in 2012. Flecanide was trialed until it had to be stopped for treatment failure (also a cardiac MRI noted a scar). He underwent PVI and SVC isolation in 11/2017. Repeat PVI and CTI were done for inducible Atrial Flutter in 4/2019. He underwent DCCV on 11/19/2020 for recurrent AFib, at that time in the setting of alcohol use.  From that time Amiodarone was used intermittently until recently. He had repeat DCCV in 8/22/2022, again (unsuccessfully) he had DCCV on 9/13/23, and was re-loaded with Amiodarone at that time, and then he underwent successful DCCV on 10/6/2023.       Most recently he was subsequently admitted here from 11/25/23 through 12/1/2023 for dyspnea, and found on Chest CT to have findings of widespread pneumonitis, consistent with amiodarone-induced lung toxicity. Therefore that was stopped. Limited TTE showed slight improvement in LV function compared with TTE from 9/2023. Bubble study was negative. Pulmonology and Cardiology were consulted. 60 mg Prednisone was started with prolonged taper over 3 months ordered. That was continued at discharge. In follow up with Pulmonology 12/18/23, PFT's showed mild restriction. Repeat CT showed substantial improvement in his pneumonitis. Zio patch was negative for recurrent afib. It has been recommended to consider repeat AFib ablation if it recurs in the absence of ETOH use.     Now, he presented for recurrent exertional dyspnea associated with progressive anasarca and reported weight gain over 20 pounds in the past two weeks.      -Appreciate cardiology consult.  He clinically improved with IV diuresis and his rate was controlled after starting metoprolol.  He was reviewed by cardiology and electrophysiology service and was advised to be discharged on higher dose of Lasix and close follow-up with Dr. Day as  outpatient.  -ct Xarelto     Accelerated hypertension: ct home Cozaar, amlodipine   Ongoing leukocytosis due to Prednisone taper: For Prednisone induced pneumonitis. Currently down to 20 mg PO daily     Pulmonary nodules: Also assessed by recent CT imaging and found to have improved in addition to the pneumonitis. Follow up with  Pulmonology as an outpatient as scheduled     BOWEN: Adherent to CPAP, continued     Gout: Resume home Allopurinol     Consultations This Hospital Stay   CARE MANAGEMENT / SOCIAL WORK IP CONSULT  CORE CLINIC EVALUATION IP CONSULT  OCCUPATIONAL THERAPY ADULT IP CONSULT  NUTRITION SERVICES ADULT IP CONSULT  CARE MANAGEMENT / SOCIAL WORK IP CONSULT  CARDIOLOGY IP CONSULT    Code Status   Full Code    Time Spent on this Encounter   I, Isael Milner MD, personally saw the patient today and spent greater than 30 minutes discharging this patient.       Isael Milner MD  [place] [id number] [facility]  [address] [place] [address]  Phone: [phone]  ______________________________________________________________________    Physical Exam   Vital Signs: Temp: 98.4  F (36.9  C) Temp src: Oral BP: 118/73 Pulse: 66   Resp: 17 SpO2: 92 % O2 Device: None (Room air)    Weight: 285 lbs 0 oz  Gen- pleasant   Neck- supple  CVS- I+II+ no m/r/g  RS- CTAB, basilar crackles  Abdo- soft, no tenderness . 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     Follow-up and recommended labs and tests     Follow up with primary care provider, Ranjan Courtney, within 7 days for hospital follow- up.  The following labs/tests are recommended: BMP.  -Patient diuretics were adjusted  -Metformin has been discontinued for now.  Follow-up kidney function and resume as appropriate    Follow up with cardiology as recommended     Activity    Your activity upon discharge: activity as tolerated     Diet    Follow this diet upon discharge: Orders Placed This Encounter      2 Gram Sodium Diet       Significant Results and Procedures   Results for orders placed or performed during the hospital encounter of 24   XR Chest 2 Views    Narrative    EXAM: XR CHEST 2 VIEWS  LOCATION: [facility]  DATE: 2024    INDICATION: shortness of breath  COMPARISON: 2023      Impression    IMPRESSION: Stable borderline enlarged heart with moderate pulmonary vascular congestion and interstitial edema. Superimposed infectious infiltrates not entirely excluded and continued attention on follow-up recommended. Probable trace pleural effusions.   No pneumothorax. Acromioclavicular joint and thoracic spine degenerative changes.   Echocardiogram Complete     Value    LVEF  55-60%    Narrative    [id number]  [id number]  [id number]  [id number]^DENNISE^EDITA^MIR     [facility]  Echocardiography Laboratory  [address] [place] [address]     Name: TOO HOWARD  MRN: [id number]  : 1950  Study Date: 2024 09:42 AM  Age: 73 yrs  Gender: Male  Patient Location: [facility]  Reason For Study: Heart Failure  Ordering Physician: EDITA BOWDEN  Performed By: Millicent Rosenberg     BSA: 2.4 m2  Height: 68 in  Weight: 295 lb  HR: 92  BP: 130/75  mmHg  ______________________________________________________________________________  Procedure  Complete Portable Echo Adult. Optison (NDC #5043-4407) given intravenously.  ______________________________________________________________________________  Interpretation Summary     The visual ejection fraction is 55-60%.  There is moderate concentric left ventricular hypertrophy.  There is mild (1+) mitral regurgitation.  Moderate valvular aortic stenosis.  Ascending aorta dilatation is present.  The rhythm was atrial fibrillation.  The patient exhibited frequent PVCs.  There is moderate biatrial enlargement.  The study was technically difficult. Contrast was used without apparent  complications. Compared to prior study, changes are noted.  ______________________________________________________________________________  Left Ventricle  The left ventricle is normal in size. There is moderate concentric left  ventricular hypertrophy. The visual ejection fraction is 55-60%. Left  ventricular diastolic function is abnormal.     Right Ventricle  The right ventricle is normal in structure, function and size.     Atria  There is moderate biatrial enlargement.     Mitral Valve  There is mild mitral annular calcification. The mitral valve leaflets are  mildly thickened. There is mild (1+) mitral regurgitation.     Tricuspid Valve  The tricuspid valve is not well visualized, but is grossly normal.     Aortic Valve  Moderate valvular aortic stenosis.     Pulmonic Valve  The pulmonic valve is not well visualized.     Vessels  The aortic root is normal size. Ascending aorta dilatation is present.     Pericardium  There is no pericardial effusion.     Rhythm  The rhythm was atrial fibrillation. The patient exhibited frequent PVCs.  ______________________________________________________________________________  MMode/2D Measurements & Calculations     IVSd: 1.4 cm  LVIDd: 5.4 cm  LVIDs: 4.3 cm  LVPWd: 1.4 cm  FS: 20.9 %  LV mass(C)d:  327.0 grams  LV mass(C)dI: 135.6 grams/m2  Ao root diam: 3.7 cm  LA dimension: 5.3 cm  asc Aorta Diam: 4.0 cm  LA/Ao: 1.4  LVOT diam: 2.4 cm  LVOT area: 4.4 cm2  Ao root diam index Ht(cm/m): 2.1  Ao root diam index BSA (cm/m2): 1.5  Asc Ao diam index BSA (cm/m2): 1.7  Asc Ao diam index Ht(cm/m): 2.3  LA Volume (BP): 72.2 ml  RV Base: 4.1 cm  RWT: 0.52  TAPSE: 2.4 cm     Doppler Measurements & Calculations  MV E max seamus: 138.4 cm/sec  MV dec slope: 814.1 cm/sec2  MV dec time: 0.17 sec  Ao V2 max: 364.4 cm/sec  Ao max P.0 mmHg  Ao V2 mean: 278.0 cm/sec  Ao mean P.9 mmHg  Ao V2 VTI: 86.1 cm  MARILYN(I,D): 1.0 cm2  MARILYN(V,D): 1.1 cm2  LV V1 max PG: 3.2 mmHg  LV V1 max: 90.0 cm/sec  LV V1 VTI: 20.1 cm  SV(LVOT): 88.7 ml  SI(LVOT): 36.8 ml/m2  PA acc time: 0.09 sec  AV Seamus Ratio (DI): 0.25  MARILYN Index (cm2/m2): 0.43  E/E' av.4  Lateral E/e': 16.8  Medial E/e': 16.0  RV S Seamus: 15.6 cm/sec     ______________________________________________________________________________  Report approved by: Laurie Garcia 2024 01:05 PM             Discharge Medications   Current Discharge Medication List        START taking these medications    Details   metoprolol tartrate (LOPRESSOR) 25 MG tablet Take 1 tablet (25 mg) by mouth 2 times daily for 30 days  Qty: 60 tablet, Refills: 0    Associated Diagnoses: Acute on chronic congestive heart failure, unspecified heart failure type (H)           CONTINUE these medications which have CHANGED    Details   !! furosemide (LASIX) 40 MG tablet Take 1 tablet (40 mg) by mouth every evening  Qty: 30 tablet, Refills: 0    Associated Diagnoses: Acute on chronic congestive heart failure, unspecified heart failure type (H)      !! furosemide (LASIX) 80 MG tablet Take 1 tablet (80 mg) by mouth daily  Qty: 30 tablet, Refills: 0    Associated Diagnoses: Acute on chronic congestive heart failure, unspecified heart failure type (H)       !! - Potential duplicate medications found. Please discuss  with provider.        CONTINUE these medications which have NOT CHANGED    Details   allopurinol (ZYLOPRIM) 300 MG tablet Take 1 tablet (300 mg) by mouth daily  Qty: 90 tablet, Refills: 4    Associated Diagnoses: Gout of foot, unspecified cause, unspecified chronicity, unspecified laterality      amLODIPine (NORVASC) 5 MG tablet Take 1 tablet (5 mg) by mouth daily  Qty: 30 tablet, Refills: 5    Associated Diagnoses: Essential hypertension      cetirizine (ZYRTEC) 10 MG tablet Take 10 mg by mouth as needed for allergies      finasteride (PROPECIA) 1 MG tablet Take 1 tablet (1 mg) by mouth daily  Qty: 90 tablet, Refills: 4    Associated Diagnoses: Alopecia      losartan (COZAAR) 100 MG tablet Take 1 tablet (100 mg) by mouth daily  Qty: 90 tablet, Refills: 4    Associated Diagnoses: Essential hypertension      potassium chloride ER (KLOR-CON M) 20 MEQ CR tablet Take 1 tablet (20 mEq) by mouth daily  Qty: 90 tablet, Refills: 1    Comments: Please update profile  Associated Diagnoses: Benign essential hypertension; Hypokalemia      predniSONE (DELTASONE) 10 MG tablet Take 6 tablets (60 mg) by mouth daily for 10 days, THEN 5 tablets (50 mg) daily for 14 days, THEN 4 tablets (40 mg) daily for 14 days, THEN 3 tablets (30 mg) daily for 14 days, THEN 3 tablets (30 mg) daily for 14 days, THEN 2 tablets (20 mg) daily for 14 days, THEN 1 tablet (10 mg) daily for 14 days, THEN 1 tablet (10 mg) every 48 hours for 14 days.  Qty: 319 tablet, Refills: 0    Associated Diagnoses: Acute respiratory failure with hypoxia (H)      rivaroxaban ANTICOAGULANT (XARELTO ANTICOAGULANT) 20 MG TABS tablet Take 1 tablet (20 mg) by mouth daily (with dinner)  Qty: 90 tablet, Refills: 3    Associated Diagnoses: Persistent atrial fibrillation (H)      sulfamethoxazole-trimethoprim (BACTRIM DS) 800-160 MG tablet Take 1 tablet by mouth daily  Qty: 90 tablet, Refills: 0    Associated Diagnoses: Acute respiratory failure with hypoxia (H)           STOP  taking these medications       ibuprofen (ADVIL/MOTRIN) 200 MG tablet Comments:   Reason for Stopping:         metFORMIN (GLUCOPHAGE XR) 500 MG 24 hr tablet Comments:   Reason for Stopping:         spironolactone (ALDACTONE) 25 MG tablet Comments:   Reason for Stopping:             Allergies   Allergies   Allergen Reactions    Amiodarone Shortness Of Breath     Suspected amiodarone toxicity involving lungs

## 2024-02-15 ENCOUNTER — TELEPHONE (OUTPATIENT)
Dept: CARDIOLOGY | Facility: CLINIC | Age: 74
End: 2024-02-15
Payer: COMMERCIAL

## 2024-02-15 ENCOUNTER — PATIENT OUTREACH (OUTPATIENT)
Dept: CARE COORDINATION | Facility: CLINIC | Age: 74
End: 2024-02-15
Payer: COMMERCIAL

## 2024-02-15 NOTE — TELEPHONE ENCOUNTER
I sent a message to Melanie about scheduling a TAVR appointment. I think she is working on it.  Please refer to his primary cardiology team for the reported weight gain and management. I was involved only on an inpatient basis. I believe he is seeing Crystal in a few days.  DI

## 2024-02-15 NOTE — TELEPHONE ENCOUNTER
"Patient was admitted to [facility] on 2/6/24 with exertional dyspnea with anasarca and acute diastolic CHF triggered by recurrent persistent atrial fibrillation with possible contribution from aortic stenosis.     PMH: morbid obesity, alcohol use disorder reportedly in remission, chronic diastolic CHF, BOWEN on CPAP, and chronic paroxysmal atrial fibrillation on DOAC-on ongoing atrial 2 prior EP procedures (2017 & 2019), recently diagnosed Amiodarone-induced lung toxicity, failure of Flecainide.    2/11/24: Echo showed EF of 55-60%.There is moderate concentric left ventricular hypertrophy. There is mild (1+) mitral regurgitation. Moderate valvular aortic stenosis. Ascending aorta dilatation is present. The rhythm was atrial fibrillation. The patient exhibited frequent PVC's. There is moderate biatrial enlargement.    IV Lasix diuresed 12 lbs.    Pt was started on Metoprolol. PTA Lasix dosage was increased to 40 mg po BID, Eliquis continued. Aldactone and Ibuprofen discontinued at time of discharge.    Called patient to discuss any post hospital d/c questions, review discharge medication, and confirm f/u appts. Patient denied any questions regarding new or changes to PTA medications.     Patient denied any chest pain or light headedness. States he continues to have some DELEON. Has gained 3 lbs overnight from 288 lbs yesterday AM to 291 this AM. States ankles are mildly swollen as well, left greater than right. Wearing compression stockings and instructed to keep legs elevated when sitting. Denies orthopnea. Has been taking all medications as prescribed. Will route this note to Dr. Rich for further review.    RN confirmed with patient that he is scheduled for labs on 2/21/24 at 0815, and an OV on 2/23/24 at 0730 with [facility] Crystal Campos at our [place] Office. Scheduled for an OV on 3/21/24 at 1035 with Dr. Day in [place]. Per Dr. Rich' IP note, \"Appointments will be made with Dr. Day to discuss AF management options and the cardiac " "structural team for aortic valve assessment.\"  No order placed for Structural Heart Referral. Will ask Dr. Rihc about this as well.    Instructed patient to weigh self every AM, after waking and using the restroom, but before breakfast and medications. Call clinic for a weight gain of 2 lbs overnight or 5 lbs in a week. Low Na+ diet encouraged. Pt instructed to bring daily wt/BP diary and medications with to f/u OV.     Patient advised to call clinic with any cardiac related questions or concerns prior to his appt. Patient verbalized understanding and agreed with plan. EVELIA Perry RN.           "

## 2024-02-15 NOTE — PROGRESS NOTES
Connected Care Resource Center:   [facility] Contact  [facility]/Voicemail     Clinical Data: Post-Discharge Outreach     Outreach attempted x 2.  Left message on patient's voicemail, providing [facility]'s central phone number of [phone] ([phone]) for questions/concerns and/or to schedule an appt with an [facility] provider, if they do not have a PCP.      Plan:  [facility] will do no further outreaches at this time.       ROMA Fink  Connected Care Resource Center, [facility]    *Connected Care Resource Team does NOT follow patient ongoing. Referrals are identified based on internal discharge reports and the outreach is to ensure patient has an understanding of their discharge instructions.

## 2024-02-16 NOTE — TELEPHONE ENCOUNTER
Crystal is off this week.  The patient is getting a BMP today. Let's see what his K and creat are before ordering more diuretic.  DI

## 2024-02-19 NOTE — TELEPHONE ENCOUNTER
Will await BMP results before adjustment in therapy per Dr. Rich' recommendation.    Crystal  February 19, 2024 at 11:28 AM

## 2024-02-21 ENCOUNTER — LAB (OUTPATIENT)
Dept: LAB | Facility: CLINIC | Age: 74
End: 2024-02-21
Payer: COMMERCIAL

## 2024-02-21 DIAGNOSIS — I48.20 CHRONIC ATRIAL FIBRILLATION (H): ICD-10-CM

## 2024-02-21 LAB
ANION GAP SERPL CALCULATED.3IONS-SCNC: 9 MMOL/L (ref 7–15)
BUN SERPL-MCNC: 25.4 MG/DL (ref 8–23)
CALCIUM SERPL-MCNC: 8.7 MG/DL (ref 8.8–10.2)
CHLORIDE SERPL-SCNC: 99 MMOL/L (ref 98–107)
CREAT SERPL-MCNC: 1.25 MG/DL (ref 0.67–1.17)
DEPRECATED HCO3 PLAS-SCNC: 32 MMOL/L (ref 22–29)
EGFRCR SERPLBLD CKD-EPI 2021: 61 ML/MIN/1.73M2
GLUCOSE SERPL-MCNC: 117 MG/DL (ref 70–99)
POTASSIUM SERPL-SCNC: 3.5 MMOL/L (ref 3.4–5.3)
SODIUM SERPL-SCNC: 140 MMOL/L (ref 135–145)

## 2024-02-21 PROCEDURE — 80048 BASIC METABOLIC PNL TOTAL CA: CPT | Performed by: INTERNAL MEDICINE

## 2024-02-21 PROCEDURE — 36415 COLL VENOUS BLD VENIPUNCTURE: CPT | Performed by: INTERNAL MEDICINE

## 2024-02-22 NOTE — PROGRESS NOTES
"[facility]    I had the pleasure of seeing Brayden when he came for follow up of AFib.  This 73 year old sees Dr. Day for his history of:    1. H/o persistent AFib. EF 50-55% 1/2022 - first dx'd in 2012. Failed flecainide (and noted scar on MRI). Underwent PVI and SVC isolation 11/2017. Repeat PVI and CTI for inducible AFlutter 4/2019. Started on Amio with DCCV 11/19/2020 for recurrent AFib in setting of increased alcohol use.   Amiodarone decreased 1/2021; back in persistent AFib 12/2021 and amiodarone stopped as felt asx'c.  This was restarted 8/2022 prior to DCCV 8/22/2022. Amiodarone stopped again 1/2023. AFib restarted 7/2023 after EtOH use, now s/p DCCV (unsuccessful 9/13), then loaded with amiodarone s/p successful DCCV 10/6/2023.  Dr. Day recommended repeat AFib ablation if he started having AFib without it being triggered by EtOH. Amiodarone stopped 11/2023 d/t discovery of pneumonitis  2. Moderate Aortic Stenosis \  3. Benign Essential HTN   4. BOWEN   5. Myocardiac Scar noted on cardiac MRI 10/2017, consistent with prior MI in the left Cx territory. EF 51%.  Avoiding flecainide use  6. H/o Alcohol dependence - no EtOH since 2017, then again 3-4 months in early 2022 after he was noted to be back in AFib.Back in AFib with EtOH use 7/2023  7. Chronic AC  On Xarelto for CHADSVASc 4 (HFpEF, HTN, age, aortic plaquing)  8. Bell's Palsy  9. Lung nodules- noted on CTA to follow-up on aorta 8/2023.   10. HFpEF - hospitalized 2/2024 for recurrent HFpEF in setting        Last Visit & Interval History:  I saw Brayden 10/2023 at which time we reviewed his successful DCCV 10/6/2023 after restarting amiodarone 9/13. He noted no improvement in DELEON, surprisingly, but felt better after increasing diuretic therapy.  When I saw him, he remained in NSR and I recommended 6 week follow-up.  Amlodipine was restarted for BP.    He sent a [employer] message noting continued DELEON as well as HR \"jumping.\"  We tried " "increasing diuretic therapy again and Ziopatch recommended (no AFib seen).  On 11/24, we contacted him with nl NTpBNP, TSH/hepatic electrolytes and renal function.  No changes were planned, but he ended up being admitted to that hospital 11/25-12/1/2023 for significant worsening of DELEON with O2 sat 81%.  CXR and CT Chest showed pneumonitis and was dx'd with amiodarone toxicity. He was seen by Pulm and was started on supplemental O2 and prolonged steroid taper.  Discharged on Metformin (given steroid use), prednisone (2-3m planned), Bactrium.  Amlodipine, Lasix 40, losartan 100 and KCl 20 BID and Xarelto were not changed. Discharge weight 272#.     He saw Dr. Mckeon (Lung nodules) 12/18 with plans to repeat CT and PFTs 3/2024.    He had to cancel his appointments with me 12/28 and 1/16.      He sent a [employer] message 2/6 noting increasing LE edema.  I had him continue furosemide 40 mg, decrease potassium to 20 mEq daily and added spironolactone 12.5.  Few days later, he did feel voiding was better after starting the spironolactone but was then hospitalized 2/11-13/2024 for anasarca/DELEON and acute diastolic heart failure. He was back in AFib.  He was diuresed with IV furosemide and started on metoprolol for RVR.  Discharge weight was 285# (from 297#) and he was discharged on metoprolol tartrate 25 mg twice daily, increased furosemide 40 mg BID, continued amlodipine 5 mg, losartan 100, potassium chloride 20 daily and Xarelto 20.  Spironolactone was discontinued.    He contacted us via [employer] 2/18 noting weight was up 10# despite increased furosemide.  Dr. Rich reviewed and recommended increasing furosemide to 80 mg BID, noting we would try torsemide if sxs did not improve.      Today's Visit:  Thinks that the furosemide 80 mg BID helped \"a little bit.\" Weight was 295# when started the higher dose of Lasix, and now down to 291.8# on his scale (2/22).  He's feeling pretty miserable, with significant LE edema up into the " "thighs, with abdominal bloating.  He recently ran his oxygen tank into his leg and now has a right lower extremity wound with what looks like the beginnings of cellulitis.  Denies fever/chills.    He denies palpitations.  No lightheadedness or dizziness.    Notes significant shortness of breath, worse with weight gain.  Uses O2 only at night with CPAP.    VITALS:  Vitals: /83   Pulse 72   Ht 1.727 m (5' 8\")   Wt 135.9 kg (299 lb 8 oz)   BMI 45.54 kg/m      Diagnostic Testing:  Echocardiogram 2/11/2024 LVEF 55-60%.  Normal RV.  Moderate CHARBEL.  Mild MAC.  1+ MR.  Moderate aortic stenosis.  Ascending aortic dilatation (4.0 cm)  Limited Echo 11/2023 LVEF 55-60%. No RWMA. Nl RV. Mild MAC with 1+MR. Mean gradient AoV was 32 mmHg and MARILYN 1.22 cm2. DVI 0.29. Root 3.2 cm. Asc aorta 4.0 cm  ZioPatch 11/24-25/2023 (stopped early d/t hospitalization) NSR 71 bpm. Occasional ectopy. No AFIb seen.  Echo 9/6/2023 showed LVEF 49%. Nl RV. Nl LA size. 1+MR. Mod AoS with mean gradient 16mmHg, Vmax 2.7 m/s, DI 0.31  Aorta 8/2023 with aneuryamsl dilation of suprarenal aorta (3.6x3.8). follow-up CTA showed no aneurysm  EKG 7/2021 showed sinus bradycardia 45 bpm.  Nonspecific ST-T wave changes  Cardiac MRI 10/2017 showed an EF of 51% with mild hypokinesis involving the basal  Component      Latest Ref Rng 1/15/2024  8:49 AM 2/11/2024  2:20 AM 2/13/2024  8:33 AM 2/21/2024  8:02 AM   Sodium      135 - 145 mmol/L 139  139  139  140    Potassium      3.4 - 5.3 mmol/L 3.9  3.7  3.8  3.5    Carbon Dioxide (CO2)      22 - 29 mmol/L 25  25  32 (H)  32 (H)    Anion Gap      7 - 15 mmol/L 12  15  9  9    Urea Nitrogen      8.0 - 23.0 mg/dL 29.3 (H)  28.5 (H)  29.5 (H)  25.4 (H)    Creatinine      0.67 - 1.17 mg/dL 1.15  1.25 (H)  1.31 (H)  1.25 (H)    GFR Estimate      >60 mL/min/1.73m2 68  61  58 (L)  61    Calcium      8.8 - 10.2 mg/dL 9.1  9.0  8.9  8.7 (L)    Chloride      98 - 107 mmol/L 102  99  98  99    Glucose      70 - 99 mg/dL 98 "  112 (H)  110 (H)  117 (H)      Component      Latest Ref Rng 2/11/2024  8:48 AM 2/13/2024  8:33 AM   WBC      4.0 - 11.0 10e3/uL 12.7 (H)  12.9 (H)    RBC Count      4.40 - 5.90 10e6/uL 4.15 (L)  4.64    Hemoglobin      13.3 - 17.7 g/dL 13.5  15.2    Hematocrit      40.0 - 53.0 % 41.2  45.6    MCV      78 - 100 fL 99  98    MCH      26.5 - 33.0 pg 32.5  32.8    MCHC      31.5 - 36.5 g/dL 32.8  33.3    RDW      10.0 - 15.0 % 14.7  14.9    Platelet Count      150 - 450 10e3/uL 227  249       Component      Latest Ref Rng 2/11/2024  8:48 AM 2/13/2024  8:33 AM   Magnesium      1.7 - 2.3 mg/dL 1.7  2.2        Plan:  Stop furosemide 80 mg BID  Start torsemide 40 mg.  Take 1 tablet today when you pick it up  On Sat/Sun/Mon/Tues, take 2 of the 40 mg tabs all at once (80 mg)  On Wednesday, decrease to 1 tab (40 mg)  Increase potassium chloride to 20 mEq twice daily  See me back 1 week 11:30  Keep appointment with Dr. Courtney today  - review leg wound as well      Assessment/Plan:    H/o Persistent AFib  Had increasing DELEON/SOB from recurrent AFib and underwent DCCV while on amiodarone 8/2022, which was decreased 10/2022, then stopped 1/2023  Had recurrent AFb 7/2023 in setting of EtOH use. Initial DCCV 9/2023 was not successful in restoring SR and amiodarone restarted 9/2023. DCCV 10/2023 restored SR  Unfortunately, hospitalized with pulmonary fibrosis 11/2023 and amiodarone was discontinued. Remained in NSR for a period of time, now back in AF  Focus has been on rate control given inability to use AAD therapy (pulmonary fibrosis with amiodarone, scar on cardiac MRI)  Has remained on anticoagulation with Xarelto for [id number] 4 (HFpEF, HTN, age, aortic plaquing)    PLAN:  Reviewed with Brayden that atrial fibrillation is certainly not helping his shortness of breath/HFpEF, but at this time, our options for treatment are limited given pulmonary toxicity with amiodarone, and scarring on MRI  Current plan is to continue rate  control with metoprolol  Continue anticoagulation  When I see him back next week, we will plan to get him back in with Dr. Day to discuss long-term treatment of his atrial fibrillation.    Recurrent HFpEF  Had this in the past with recurrent atrial fibrillation, which is why we have tried to maintain SR  Unfortunately, back in AF and not able to use amiodarone due to his pneumonitis and have had to adopt rate control strategy.  Aortic stenosis also thought to be contributing  As above, Dr. Rich recommended increasing furosemide to 80 mg BID 2/20  Blood work on Lasix 40 mg BID as above.    Weight has improved on the increased dose of furosemide 80 mg twice daily, but he is still carrying a significant amount of fluid, up 20 pounds since 7/2023  On exam, has JVD, HJR, pitting LE edema.  Lungs are clear    PLAN:  As above, switch furosemide 80 mg twice daily to torsemide  Take 40 mg (1 tab) today when you pick it up  On Sat/Sun/Mon/Tues, take 2 of the 40 mg tabs all at once (80 mg)  On Wednesday, decrease to 1 tab (40 mg)  Increase KCl to 20 mEq BID  See me back next week for close follow-up    Pulmonary Fibrosis  Amiodarone has been used off/on given c/o increasing DELEON/HFpEF when in persistent AFib since 10/2021.  Most recently restarted after unsuccessful DCCV 9/2023   Now STOPPED 11/2023 and added to allergy list d/t pneumonitis  Has routine follow-up with Pulm    PLAN:  Continue routine follow-up with Pulmonology - plan for  follow-up  visit and testing ~3/2024    HTN  Currently on furosemide 80 mg BID, metoprolol tartrate 25 mg BID, amlodipine 5 mg daily, losartan 100 mg daily  BP is fine    PLAN:  Switch diuretic therapy as above    4. Aortic Stenosis  Mild/moderate on echo 10/2022, stable on echo 9/2023, up to at least moderate on echo while hospitalized 2/2024    PLAN:  [facility]ointment with [facility] has been recommended and is in the process of being arranged.  Note that echo showed only moderate  aortic stenosis, and may be too early to intervene,but will get their opinion        Crystal Campos PA-C, MSPAS      No orders of the defined types were placed in this encounter.    Orders Placed This Encounter   Medications    DISCONTD: furosemide (LASIX) 80 MG tablet     Sig: Take 80 mg by mouth 2 times daily    potassium chloride pushpa ER (KLOR-CON M20) 20 MEQ CR tablet     Sig: Take 1 tablet (20 mEq) by mouth 2 times daily    torsemide 40 MG TABS     Sig: Take 80 mg by mouth daily     Dispense:  60 tablet     Refill:  3     Replaces furosemide     Medications Discontinued During This Encounter   Medication Reason    furosemide (LASIX) 40 MG tablet Med Rec(No AVS / No eCancel)    furosemide (LASIX) 80 MG tablet Med Rec(No AVS / No eCancel)    amLODIPine (NORVASC) 5 MG tablet     furosemide (LASIX) 80 MG tablet     potassium chloride ER (KLOR-CON M) 20 MEQ CR tablet Reorder (No AVS)         Encounter Diagnoses   Name Primary?    Essential hypertension     Persistent atrial fibrillation (H) Yes    Benign essential hypertension     Hypokalemia     Lower extremity edema        CURRENT MEDICATIONS:  Current Outpatient Medications   Medication Sig Dispense Refill    allopurinol (ZYLOPRIM) 300 MG tablet Take 1 tablet (300 mg) by mouth daily 90 tablet 4    cetirizine (ZYRTEC) 10 MG tablet Take 10 mg by mouth as needed for allergies      finasteride (PROPECIA) 1 MG tablet Take 1 tablet (1 mg) by mouth daily 90 tablet 4    losartan (COZAAR) 100 MG tablet Take 1 tablet (100 mg) by mouth daily 90 tablet 4    metoprolol tartrate (LOPRESSOR) 25 MG tablet Take 1 tablet (25 mg) by mouth 2 times daily for 30 days 60 tablet 0    potassium chloride pushpa ER (KLOR-CON M20) 20 MEQ CR tablet Take 1 tablet (20 mEq) by mouth 2 times daily      predniSONE (DELTASONE) 10 MG tablet Take 6 tablets (60 mg) by mouth daily for 10 days, THEN 5 tablets (50 mg) daily for 14 days, THEN 4 tablets (40 mg) daily for 14 days, THEN 3 tablets (30 mg) daily  "for 14 days, THEN 3 tablets (30 mg) daily for 14 days, THEN 2 tablets (20 mg) daily for 14 days, THEN 1 tablet (10 mg) daily for 14 days, THEN 1 tablet (10 mg) every 48 hours for 14 days. 319 tablet 0    rivaroxaban ANTICOAGULANT (XARELTO ANTICOAGULANT) 20 MG TABS tablet Take 1 tablet (20 mg) by mouth daily (with dinner) 90 tablet 3    sulfamethoxazole-trimethoprim (BACTRIM DS) 800-160 MG tablet Take 1 tablet by mouth daily 90 tablet 0    torsemide 40 MG TABS Take 80 mg by mouth daily 60 tablet 3       ALLERGIES     Allergies   Allergen Reactions    Amiodarone Shortness Of Breath     Suspected amiodarone toxicity involving lungs         Review of Systems:  Skin:  Negative     Eyes:  Positive for visual blurring  ENT:  Negative    Respiratory:  Negative for sleep apnea;dyspnea on exertion  Cardiovascular:  Negative for;palpitations;chest pain edema;Positive for;exercise intolerance  Gastroenterology: Negative for melena;hematochezia  Genitourinary:  Positive for nocturia  Musculoskeletal:  Negative for muscular weakness  Neurologic:  Positive for    Psychiatric:  Negative    Heme/Lymph/Imm:  Negative    Endocrine:  Negative      Physical Exam:  Vitals: /83   Pulse 72   Ht 1.727 m (5' 8\")   Wt 135.9 kg (299 lb 8 oz)   BMI 45.54 kg/m      Constitutional:  cooperative, alert and oriented, well developed, well nourished, in no acute distress        Skin:  warm and dry to the touch, no apparent skin lesions or masses noted        Head:  normocephalic, no masses or lesions        Eyes:  pupils equal and round;conjunctivae and lids unremarkable;sclera white        ENT:      Slight R mouth droopiness - Sterrett Palsy    Neck:    JVP elevated;hepatojugular reflux      Chest:  normal breath sounds, clear to auscultation, normal A-P diameter, normal symmetry, normal respiratory excursion, no use of accessory muscles        Cardiac:   irregularly irregular rhythm       systolic ejection murmur;grade 2        Abdomen:  " abdomen soft obese      Vascular: pulses full and equal                                      Extremities and Back:  no deformities, clubbing, cyanosis, erythema observed   R LE cellulitis/wound    Neurological:  no gross motor deficits            PAST MEDICAL HISTORY:  Past Medical History:   Diagnosis Date    (HFpEF) heart failure with preserved ejection fraction (H)     Acute gouty arthritis     Alcohol use disorder in remission     Amiodarone pulmonary toxicity     Aortic stenosis     Basal cell carcinoma     Of the nose    Bell's palsy     Herpes simplex without mention of complication     Hyperlipidemia     Hypertension 2000    Persistent atrial fibrillation (H)     persistent, DCCV 12/2017, ablation 11/6/17    Pulmonary nodules     Sleep apnea     CPAP       PAST SURGICAL HISTORY:  Past Surgical History:   Procedure Laterality Date    ABLATION OF DYSRHYTHMIC FOCUS  2018, 04/12/2019    Procedure: EP Ablation Focal AFIB;  Surgeon: Fady Day MD;  Location:  [facility]    ACHILLES TENDON SURGERY  1997    ANESTHESIA CARDIOVERSION N/A 2/26/2019    Procedure: ANESTHESIA CARDIOVERSION (CONNER);  Surgeon: GENERIC ANESTHESIA PROVIDER;  Location:  OR    ANESTHESIA CARDIOVERSION N/A 11/19/2020    Procedure: ANESTHESIA, FOR CARDIOVERSION (dr campbell);  Surgeon: GENERIC ANESTHESIA PROVIDER;  Location:  OR    ANESTHESIA CARDIOVERSION N/A 8/22/2022    Procedure: CARDIOVERSION;  Surgeon: GENERIC ANESTHESIA PROVIDER;  Location:  OR    ANESTHESIA CARDIOVERSION N/A 9/13/2023    Procedure: Anesthesia cardioversion;  Surgeon: GENERIC ANESTHESIA PROVIDER;  Location:  OR    ANESTHESIA CARDIOVERSION N/A 10/6/2023    Procedure: Anesthesia cardioversion;  Surgeon: GENERIC ANESTHESIA PROVIDER;  Location:  OR    BASAL CELL CARCINOMA EXCISION Right 2009    nose, took cartilage from inner ear.    CARDIOVERSION  2012, 2019    cardioversion for atr fib    COLONOSCOPY  2008    COLONOSCOPY N/A 3/22/2023    Procedure:  COLONOSCOPY, FLEXIBLE, WITH LESION REMOVAL USING SNARE;  Surgeon: Anne Lucero MD;  Location:  GI    EP ABLATION FOCAL AFIB N/A 2019    Procedure: EP Ablation Focal AFIB;  Surgeon: Fady Day MD;  Location:  [facility]    HERNIA REPAIR, UMBILICAL  2012    Procedure: HERNIORRHAPHY UMBILICAL;  UMBILICAL HERNIA REPAIR;  Surgeon: Ra Crocker MD;  Location: [facility]    HERNIORRHAPHY UMBILICAL  2012    Procedure: HERNIORRHAPHY UMBILICAL;  UMBILICAL HERNIA REPAIR;  Surgeon: Ra Crocker MD;  Location: [facility]    ORTHOPEDIC SURGERY      ORTHOPEDIC SURGERY      OTHER SURGICAL HISTORY Right 2017    total KNEE ARTHROSCOPY     TOTAL KNEE ARTHROPLASTY Left 2018    ZZC NONSPECIFIC PROCEDURE      achilles tendon    ZZC NONSPECIFIC PROCEDURE      knees, arthroscopy    ZZC NONSPECIFIC PROCEDURE      basal cell skin ca, nose    ZZC NONSPECIFIC PROCEDURE      flex sig; colonoscopy due 3/2008    ZZC NONSPECIFIC PROCEDURE      cardioversion for atr fib    ZZC TOTAL KNEE ARTHROPLASTY Left 2018    Dr. Malick Suarez       FAMILY HISTORY:  Family History   Problem Relation Age of Onset    Family History Negative Mother     Heart Disease Father         decesased at 42 - MI    Heart Disease Sister          MI    Lipids Sister     Lipids Sister     No Known Problems Sister     Heart Disease Brother          MI    Heart Disease Brother         CABG AT 49    Lung Cancer No family hx of        SOCIAL HISTORY:  Social History     Socioeconomic History    Marital status: Single     Spouse name: None    Number of children: None    Years of education: None    Highest education level: None   Tobacco Use    Smoking status: Former     Packs/day: 1.00     Years: 10.00     Additional pack years: 0.00     Total pack years: 10.00     Types: Cigarettes     Start date:      Quit date: 2011     Years since quittin.5    Smokeless tobacco: Never    Tobacco  comments:     quit 2011   Vaping Use    Vaping Use: Never used   Substance and Sexual Activity    Alcohol use: Not Currently    Drug use: Never    Sexual activity: Not Currently     Partners: Female     Birth control/protection: None   Social History Narrative    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

## 2024-02-22 NOTE — PROGRESS NOTES
SUBJECTIVE:   Mike Schultz is a 68 year old male who presents to clinic today for the following health issues:    Hyperlipidemia Follow-Up      Rate your low fat/cholesterol diet?: fair    Taking statin?  No    Other lipid medications/supplements?:  none    Hypertension Follow-up      Outpatient blood pressures are being checked at home.  Results are ~ 150's/90-95    Low Salt Diet: low salt      Amount of exercise or physical activity: 4-5 days/week for an average of less than 15 minutes    Problems taking medications regularly: No    Medication side effects: none    Diet: low salt and low fat/cholesterol      Reviewed and updated as needed this visit by clinical staff:  Medications  Allergies  Soc Hx   Additional history: as documented    Drinking 5 oz alcohol nightly    Additional issues to address:  1.  Back in Atrial fib, had cardioversion yesterday which was not successful.   Tentatively scheduled for ablation soon.  - intermittent increased shortness of breath.   Better when takes diuretics regularly.   Mild dyspnea on exertion.     - remains on xarelto  2.  Fluid retention has been more, no diuretic due to travels past two days.   Has been able to work.     ROS:    Constitutional, HEENT, cardiovascular, pulmonary, gi and gu systems are negative, except as otherwise noted.    OBJECTIVE:                                                      BP (!) 152/94   Pulse 64   Temp 97.9  F (36.6  C) (Oral)   Wt 139.8 kg (308 lb 3.2 oz)   SpO2 94%   BMI 46.18 kg/m    Body mass index is 46.18 kg/m .  No apparent distress  Mostly regular heart tones at this time   1+ pitting edema lower legs    ASSESSMENT:                                                      HTN, blood pressure high today  Mixed hyperlipidemia, diet control  Morbid obesity, likely some fluid retention.  Contributing to above.  Borderline renal function  Alcohol abuse, ongoing.  History of mild withdrawal symptoms when stopping in the past.  No  "[facility]  Progress Note  Name: Celine Roa I  MRN: [id number]  Unit/Bed#: [place] [address] -01 I Date of Admission: 2024   Date of Service: 2024 I Hospital Day: 9    Assessment/Plan   * Abdominal pain  Assessment & Plan  Persistent RUQ pain  S/p ERCP and sphincterotomy  MRCP with stable findings  ? Narcotic bowel due to dilaudid  Wean off narcotics.   Tried morphine x 1 which she tolerated. However, she showed a paper to her nurse showing she should not be on morphine but \"Ok\" to have fentanyl, dilaudid, tramadol  She was ok with tramadol  Switch back to clears    Mast cell activation syndrome (HCC)  Assessment & Plan  Corn allergy noted   Multiple drug allergies noted but tolerated zosyn and unasyn  She tolerated lovenox and bentyl which were added recently    GERD without esophagitis  Assessment & Plan  Continue Pepcid 40 ng BID    Crohn's colitis (HCC)  Assessment & Plan  History of Crohn's disease on Skyrizi as well as methotrexate  Solumedrol switched to PO prednisone 40 per GI  Slow taper per GI    Acquired hypothyroidism  Assessment & Plan  Continue Cytomel           VTE Pharmacologic Prophylaxis: VTE Score: 3 Moderate Risk (Score 3-4) - Pharmacological DVT Prophylaxis Ordered: enoxaparin (Lovenox).    Patient Centered Rounds: I performed bedside rounds with nursing staff today.   Discussions with Specialists or Other Care Team Provider: GI    Current Length of Stay: 9 day(s)  Current Patient Status: Inpatient   Certification Statement: The patient will continue to require additional inpatient hospital stay due to RUQ pain  Discharge Plan: Anticipate discharge in 48 hrs to home.    Code Status: Level 1 - Full Code    Subjective:   RUQ pain worse after eating  She has been using dilaudid consistently  + BM      Objective:     Vitals:   Temp (24hrs), Av.2 °F (36.8 °C), Min:98 °F (36.7 °C), Max:98.4 °F (36.9 °C)    Temp:  [98 °F (36.7 °C)-98.4 °F (36.9 °C)] 98.3 °F (36.8 " DTs, shaking, etc.    Hyperglycemia, pre-diabetes  Dilated ascend aorta - repeat echo October     PLAN:                                                      1.  MEDICATIONS:        - Resume taking furosemide regularly       - Continue other medications without change  2.  Try to limit sodium somewhat further  3.  Consider trying Weight Watchers, or similar program.   Consider 2552-8714 calorie diet, and gradually increase exercise > 3 hours per week.  4.  Follow-up in 3 months for blood pressure check.  5.  Consider going back to , which worked well in the past.   6.  If able to stop alcohol fully, we will use lorazepam 0.5-1.0 mg four times daily as needed for any withdrawal symptoms.   Do not drive on this, unless you have founds that is causes no sedation.      Johnny Kay MD  [facility]    I spent >40 min spent with patient, more than 50% spent on discussion/education/planning - as outlined in assessment and plan      °C)  HR:  [51-84] 84  Resp:  [18] 18  BP: (116-148)/(75-95) 148/95  SpO2:  [96 %-97 %] 97 %  Body mass index is 27.32 kg/m².     Input and Output Summary (last 24 hours):   No intake or output data in the 24 hours ending 02/22/24 1725    Physical Exam:   Physical Exam  Vitals reviewed.   Constitutional:       Appearance: She is not ill-appearing.   HENT:      Head: Normocephalic and atraumatic.      Nose: No congestion or rhinorrhea.   Eyes:      General: No scleral icterus.  Cardiovascular:      Rate and Rhythm: Regular rhythm.   Pulmonary:      Breath sounds: No wheezing or rhonchi.   Abdominal:      Palpations: Abdomen is soft.      Tenderness: There is abdominal tenderness.   Musculoskeletal:      Right lower leg: No edema.      Left lower leg: No edema.   Skin:     General: Skin is warm and dry.   Neurological:      Mental Status: She is oriented to person, place, and time.   Psychiatric:      Comments: crying          Additional Data:     Labs:  Results from last 7 days   Lab Units 02/22/24  0531   WBC Thousand/uL 15.55*   HEMOGLOBIN g/dL 13.1   HEMATOCRIT % 39.0   PLATELETS Thousands/uL 407*   NEUTROS PCT % 63   LYMPHS PCT % 22   MONOS PCT % 11   EOS PCT % 1     Results from last 7 days   Lab Units 02/22/24  0531 02/20/24  0507   SODIUM mmol/L 138 140   POTASSIUM mmol/L 3.5 3.4*   CHLORIDE mmol/L 103 105   CO2 mmol/L 29 26   BUN mg/dL 15 9   CREATININE mg/dL 0.70 0.52*   ANION GAP mmol/L 6 9   CALCIUM mg/dL 8.6 8.1*   ALBUMIN g/dL  --  3.2*   TOTAL BILIRUBIN mg/dL  --  0.64   ALK PHOS U/L  --  148*   ALT U/L  --  112*   AST U/L  --  35   GLUCOSE RANDOM mg/dL 99 88                       Lines/Drains:  Invasive Devices       Peripheral Intravenous Line  Duration             Peripheral IV 02/19/24 Right Hand 3 days              Line  Duration             Pump Device  -- days                          Imaging: Reviewed radiology reports from this admission including: abdominal/pelvic CT and MRI  abdomen/MRCP    Recent Cultures (last 7 days):   Results from last 7 days   Lab Units 02/16/24  1117   BLOOD CULTURE  No Growth After 5 Days.  No Growth After 5 Days.       Last 24 Hours Medication List:   Current Facility-Administered Medications   Medication Dose Route Frequency Provider Last Rate    acetaminophen  650 mg Oral Q8H [facility] Babin MD      bisacodyl  10 mg Oral Daily Shady Montano MD      butalbital-acetaminophen-caffeine  1 tablet Oral Q4H PRN Miles Holm, DO      dicyclomine  20 mg Oral 4x Daily (AC & HS) Vipul Partida MD      diphenhydrAMINE  50 mg Intravenous Q4H PRN Miles Holm, DO      enoxaparin  40 mg Subcutaneous Q24H [facility] Vipul Partida MD      famotidine  40 mg Oral BID [facility] Babin MD      folic acid  1 mg Oral Daily Miles Holm, DO      hydrOXYzine HCL  15 mg Oral Daily Miles Holm, DO      levothyroxine  75 mcg Oral Every Other Day Miles Holm, DO      liothyronine  5 mcg Oral Daily Miles Holm, DO      loratadine  10 mg Oral Daily Miles Holm, DO      mirtazapine  15 mg Oral HS Miles Holm, DO      montelukast  10 mg Oral HS Miles Holm, DO      ondansetron  4 mg Intravenous Q6H PRN Miles Holm, DO      oxybutynin  5 mg Oral BID PRN Miles Holm, DO      predniSONE  40 mg Oral Daily Shady Montano MD      traMADol  50 mg Oral Q6H PRN Vipul Partida MD          Today, Patient Was Seen By: Vipul Partida MD    **Please Note: This note may have been constructed using a voice recognition system.**

## 2024-02-23 ENCOUNTER — OFFICE VISIT (OUTPATIENT)
Dept: CARDIOLOGY | Facility: CLINIC | Age: 74
End: 2024-02-23
Payer: COMMERCIAL

## 2024-02-23 ENCOUNTER — TELEPHONE (OUTPATIENT)
Dept: WOUND CARE | Facility: CLINIC | Age: 74
End: 2024-02-23

## 2024-02-23 ENCOUNTER — OFFICE VISIT (OUTPATIENT)
Dept: INTERNAL MEDICINE | Facility: CLINIC | Age: 74
End: 2024-02-23
Payer: COMMERCIAL

## 2024-02-23 VITALS
DIASTOLIC BLOOD PRESSURE: 88 MMHG | BODY MASS INDEX: 45.22 KG/M2 | OXYGEN SATURATION: 94 % | HEART RATE: 85 BPM | TEMPERATURE: 97 F | WEIGHT: 297.4 LBS | SYSTOLIC BLOOD PRESSURE: 139 MMHG

## 2024-02-23 VITALS
WEIGHT: 299.5 LBS | HEART RATE: 72 BPM | BODY MASS INDEX: 45.39 KG/M2 | HEIGHT: 68 IN | SYSTOLIC BLOOD PRESSURE: 126 MMHG | DIASTOLIC BLOOD PRESSURE: 83 MMHG

## 2024-02-23 DIAGNOSIS — S91.001A OPEN WOUND OF KNEE, LEG, AND ANKLE, RIGHT, INITIAL ENCOUNTER: ICD-10-CM

## 2024-02-23 DIAGNOSIS — I10 ESSENTIAL HYPERTENSION: ICD-10-CM

## 2024-02-23 DIAGNOSIS — S81.801A OPEN WOUND OF KNEE, LEG, AND ANKLE, RIGHT, INITIAL ENCOUNTER: ICD-10-CM

## 2024-02-23 DIAGNOSIS — Z09 ENCOUNTER FOR EXAMINATION FOLLOWING TREATMENT AT HOSPITAL: Primary | ICD-10-CM

## 2024-02-23 DIAGNOSIS — E87.6 HYPOKALEMIA: ICD-10-CM

## 2024-02-23 DIAGNOSIS — I10 BENIGN ESSENTIAL HYPERTENSION: ICD-10-CM

## 2024-02-23 DIAGNOSIS — I48.19 PERSISTENT ATRIAL FIBRILLATION (H): ICD-10-CM

## 2024-02-23 DIAGNOSIS — R60.0 LOWER EXTREMITY EDEMA: ICD-10-CM

## 2024-02-23 DIAGNOSIS — I42.8 OTHER CARDIOMYOPATHIES (H): ICD-10-CM

## 2024-02-23 DIAGNOSIS — S81.001A OPEN WOUND OF KNEE, LEG, AND ANKLE, RIGHT, INITIAL ENCOUNTER: ICD-10-CM

## 2024-02-23 DIAGNOSIS — I77.810 ASCENDING AORTA DILATATION (H): ICD-10-CM

## 2024-02-23 DIAGNOSIS — L03.115 CELLULITIS OF LEG, RIGHT: ICD-10-CM

## 2024-02-23 DIAGNOSIS — I50.33 ACUTE ON CHRONIC HEART FAILURE WITH PRESERVED EJECTION FRACTION (H): ICD-10-CM

## 2024-02-23 DIAGNOSIS — E66.813 CLASS 3 OBESITY: ICD-10-CM

## 2024-02-23 DIAGNOSIS — I48.19 PERSISTENT ATRIAL FIBRILLATION (H): Primary | ICD-10-CM

## 2024-02-23 PROBLEM — J96.01 ACUTE RESPIRATORY FAILURE WITH HYPOXIA (H): Status: RESOLVED | Noted: 2023-11-25 | Resolved: 2024-02-23

## 2024-02-23 PROCEDURE — 99495 TRANSJ CARE MGMT MOD F2F 14D: CPT | Performed by: INTERNAL MEDICINE

## 2024-02-23 PROCEDURE — 99214 OFFICE O/P EST MOD 30 MIN: CPT | Performed by: PHYSICIAN ASSISTANT

## 2024-02-23 RX ORDER — TORSEMIDE 20 MG/1
80 TABLET ORAL DAILY
Qty: 120 TABLET | Refills: 1 | Status: SHIPPED | OUTPATIENT
Start: 2024-02-23 | End: 2024-03-21

## 2024-02-23 RX ORDER — DOXYCYCLINE 100 MG/1
100 CAPSULE ORAL 2 TIMES DAILY
Qty: 20 CAPSULE | Refills: 0 | Status: SHIPPED | OUTPATIENT
Start: 2024-02-23 | End: 2024-03-04

## 2024-02-23 RX ORDER — POTASSIUM CHLORIDE 1500 MG/1
20 TABLET, EXTENDED RELEASE ORAL 2 TIMES DAILY
Start: 2024-02-23 | End: 2024-03-08

## 2024-02-23 RX ORDER — CEFADROXIL 500 MG/1
500 CAPSULE ORAL 2 TIMES DAILY
Qty: 20 CAPSULE | Refills: 0 | Status: SHIPPED | OUTPATIENT
Start: 2024-02-23 | End: 2024-03-08

## 2024-02-23 RX ORDER — FUROSEMIDE 80 MG
80 TABLET ORAL 2 TIMES DAILY
COMMUNITY
End: 2024-02-23

## 2024-02-23 ASSESSMENT — PATIENT HEALTH QUESTIONNAIRE - PHQ9
10. IF YOU CHECKED OFF ANY PROBLEMS, HOW DIFFICULT HAVE THESE PROBLEMS MADE IT FOR YOU TO DO YOUR WORK, TAKE CARE OF THINGS AT HOME, OR GET ALONG WITH OTHER PEOPLE: NOT DIFFICULT AT ALL
SUM OF ALL RESPONSES TO PHQ QUESTIONS 1-9: 7
SUM OF ALL RESPONSES TO PHQ QUESTIONS 1-9: 7

## 2024-02-23 NOTE — PATIENT INSTRUCTIONS
Brayden - it was good to see you today!    Reviewed terribly SOB with way more swelling despite higher Lasix dose  Reviewed R leg wound - seeing Dr. Courtney today    PLAN:  STOP furosemide 80 mg twice/day  START torsemide (stronger water pill).  40 mg tablet  Take 1 tablet today when you pick it up  On Sat/Sun/Mon/Tues, take 2 of the 40 mg tabs all at once (80 mg)  On Wednesday, decrease to 1 tab  See me Friday    3.  INCREASE potassium to 20 mEq TWICE daily starting today  4.  HOLD amlodipine right now to help with swelling  5.  See me back on Friday 3/1 @ 11:30  6. Keep doing weights and limiting sodium  7.  See Dr. Courtney with R leg wound    [phone]

## 2024-02-23 NOTE — LETTER
2/23/2024    Ranjan Courtney MD  [address] [facility] [address]    RE: Mike Schultz       Dear Colleague,     I had the pleasure of seeing Mike Schultz in the [facility].  [facility]    I had the pleasure of seeing Brayden when he came for follow up of AFib.  This 73 year old sees Dr. Day for his history of:    1. H/o persistent AFib. EF 50-55% 1/2022 - first dx'd in 2012. Failed flecainide (and noted scar on MRI). Underwent PVI and SVC isolation 11/2017. Repeat PVI and CTI for inducible AFlutter 4/2019. Started on Amio with DCCV 11/19/2020 for recurrent AFib in setting of increased alcohol use.   Amiodarone decreased 1/2021; back in persistent AFib 12/2021 and amiodarone stopped as felt asx'c.  This was restarted 8/2022 prior to DCCV 8/22/2022. Amiodarone stopped again 1/2023. AFib restarted 7/2023 after EtOH use, now s/p DCCV (unsuccessful 9/13), then loaded with amiodarone s/p successful DCCV 10/6/2023.  Dr. Day recommended repeat AFib ablation if he started having AFib without it being triggered by EtOH. Amiodarone stopped 11/2023 d/t discovery of pneumonitis  2. Moderate Aortic Stenosis \  3. Benign Essential HTN   4. BOWEN   5. Myocardiac Scar noted on cardiac MRI 10/2017, consistent with prior MI in the left Cx territory. EF 51%.  Avoiding flecainide use  6. H/o Alcohol dependence - no EtOH since 2017, then again 3-4 months in early 2022 after he was noted to be back in AFib.Back in AFib with EtOH use 7/2023  7. Chronic AC  On Xarelto for CHADSVASc 4 (HFpEF, HTN, age, aortic plaquing)  8. Bell's Palsy  9. Lung nodules- noted on CTA to follow-up on aorta 8/2023.   10. HFpEF - hospitalized 2/2024 for recurrent HFpEF in setting        Last Visit & Interval History:  I saw Brayden 10/2023 at which time we reviewed his successful DCCV 10/6/2023 after restarting amiodarone 9/13. He noted no improvement in DELEON, surprisingly, but felt better after increasing diuretic  "therapy.  When I saw him, he remained in NSR and I recommended 6 week follow-up.  Amlodipine was restarted for BP.    He sent a [employer] message noting continued DELEON as well as HR \"jumping.\"  We tried increasing diuretic therapy again and Isaias recommended (no AFib seen).  On 11/24, we contacted him with nl NTpBNP, TSH/hepatic electrolytes and renal function.  No changes were planned, but he ended up being admitted to that hospital 11/25-12/1/2023 for significant worsening of DELEON with O2 sat 81%.  CXR and CT Chest showed pneumonitis and was dx'd with amiodarone toxicity. He was seen by Pulm and was started on supplemental O2 and prolonged steroid taper.  Discharged on Metformin (given steroid use), prednisone (2-3m planned), Bactrium.  Amlodipine, Lasix 40, losartan 100 and KCl 20 BID and Xarelto were not changed. Discharge weight 272#.     He saw Dr. Mckeon (Lung nodules) 12/18 with plans to repeat CT and PFTs 3/2024.    He had to cancel his appointments with me 12/28 and 1/16.      He sent a [employer] message 2/6 noting increasing LE edema.  I had him continue furosemide 40 mg, decrease potassium to 20 mEq daily and added spironolactone 12.5.  Few days later, he did feel voiding was better after starting the spironolactone but was then hospitalized 2/11-13/2024 for anasarca/DELEON and acute diastolic heart failure. He was back in AFib.  He was diuresed with IV furosemide and started on metoprolol for RVR.  Discharge weight was 285# (from 297#) and he was discharged on metoprolol tartrate 25 mg twice daily, increased furosemide 40 mg BID, continued amlodipine 5 mg, losartan 100, potassium chloride 20 daily and Xarelto 20.  Spironolactone was discontinued.    He contacted us via [employer] 2/18 noting weight was up 10# despite increased furosemide.  Dr. Rich reviewed and recommended increasing furosemide to 80 mg BID, noting we would try torsemide if sxs did not improve.      Today's Visit:  Thinks that the furosemide " "80 mg BID helped \"a little bit.\" Weight was 295# when started the higher dose of Lasix, and now down to 291.8# on his scale (2/22).  He's feeling pretty miserable, with significant LE edema up into the thighs, with abdominal bloating.  He recently ran his oxygen tank into his leg and now has a right lower extremity wound with what looks like the beginnings of cellulitis.  Denies fever/chills.    He denies palpitations.  No lightheadedness or dizziness.    Notes significant shortness of breath, worse with weight gain.  Uses O2 only at night with CPAP.    VITALS:  Vitals: /83   Pulse 72   Ht 1.727 m (5' 8\")   Wt 135.9 kg (299 lb 8 oz)   BMI 45.54 kg/m      Diagnostic Testing:  Echocardiogram 2/11/2024 LVEF 55-60%.  Normal RV.  Moderate CHARBEL.  Mild MAC.  1+ MR.  Moderate aortic stenosis.  Ascending aortic dilatation (4.0 cm)  Limited Echo 11/2023 LVEF 55-60%. No RWMA. Nl RV. Mild MAC with 1+MR. Mean gradient AoV was 32 mmHg and MARILYN 1.22 cm2. DVI 0.29. Root 3.2 cm. Asc aorta 4.0 cm  ZioPatch 11/24-25/2023 (stopped early d/t hospitalization) NSR 71 bpm. Occasional ectopy. No AFIb seen.  Echo 9/6/2023 showed LVEF 49%. Nl RV. Nl LA size. 1+MR. Mod AoS with mean gradient 16mmHg, Vmax 2.7 m/s, DI 0.31  Aorta 8/2023 with aneuryamsl dilation of suprarenal aorta (3.6x3.8). follow-up CTA showed no aneurysm  EKG 7/2021 showed sinus bradycardia 45 bpm.  Nonspecific ST-T wave changes  Cardiac MRI 10/2017 showed an EF of 51% with mild hypokinesis involving the basal  Component      Latest Ref Rng 1/15/2024  8:49 AM 2/11/2024  2:20 AM 2/13/2024  8:33 AM 2/21/2024  8:02 AM   Sodium      135 - 145 mmol/L 139  139  139  140    Potassium      3.4 - 5.3 mmol/L 3.9  3.7  3.8  3.5    Carbon Dioxide (CO2)      22 - 29 mmol/L 25  25  32 (H)  32 (H)    Anion Gap      7 - 15 mmol/L 12  15  9  9    Urea Nitrogen      8.0 - 23.0 mg/dL 29.3 (H)  28.5 (H)  29.5 (H)  25.4 (H)    Creatinine      0.67 - 1.17 mg/dL 1.15  1.25 (H)  1.31 (H)  " 1.25 (H)    GFR Estimate      >60 mL/min/1.73m2 68  61  58 (L)  61    Calcium      8.8 - 10.2 mg/dL 9.1  9.0  8.9  8.7 (L)    Chloride      98 - 107 mmol/L 102  99  98  99    Glucose      70 - 99 mg/dL 98  112 (H)  110 (H)  117 (H)      Component      Latest Ref Rng 2/11/2024  8:48 AM 2/13/2024  8:33 AM   WBC      4.0 - 11.0 10e3/uL 12.7 (H)  12.9 (H)    RBC Count      4.40 - 5.90 10e6/uL 4.15 (L)  4.64    Hemoglobin      13.3 - 17.7 g/dL 13.5  15.2    Hematocrit      40.0 - 53.0 % 41.2  45.6    MCV      78 - 100 fL 99  98    MCH      26.5 - 33.0 pg 32.5  32.8    MCHC      31.5 - 36.5 g/dL 32.8  33.3    RDW      10.0 - 15.0 % 14.7  14.9    Platelet Count      150 - 450 10e3/uL 227  249       Component      Latest Ref Rng 2/11/2024  8:48 AM 2/13/2024  8:33 AM   Magnesium      1.7 - 2.3 mg/dL 1.7  2.2        Plan:  Stop furosemide 80 mg BID  Start torsemide 40 mg.  Take 1 tablet today when you pick it up  On Sat/Sun/Mon/Tues, take 2 of the 40 mg tabs all at once (80 mg)  On Wednesday, decrease to 1 tab (40 mg)  Increase potassium chloride to 20 mEq twice daily  See me back 1 week 11:30  Keep appointment with Dr. Courtney today  - review leg wound as well      Assessment/Plan:    H/o Persistent AFib  Had increasing DELEON/SOB from recurrent AFib and underwent DCCV while on amiodarone 8/2022, which was decreased 10/2022, then stopped 1/2023  Had recurrent AFb 7/2023 in setting of EtOH use. Initial DCCV 9/2023 was not successful in restoring SR and amiodarone restarted 9/2023. DCCV 10/2023 restored SR  Unfortunately, hospitalized with pulmonary fibrosis 11/2023 and amiodarone was discontinued. Remained in NSR for a period of time, now back in AF  Focus has been on rate control given inability to use AAD therapy (pulmonary fibrosis with amiodarone, scar on cardiac MRI)  Has remained on anticoagulation with Xarelto for [id number] 4 (HFpEF, HTN, age, aortic plaquing)    PLAN:  Reviewed with Brayden that atrial fibrillation is  certainly not helping his shortness of breath/HFpEF, but at this time, our options for treatment are limited given pulmonary toxicity with amiodarone, and scarring on MRI  Current plan is to continue rate control with metoprolol  Continue anticoagulation  When I see him back next week, we will plan to get him back in with Dr. Day to discuss long-term treatment of his atrial fibrillation.    Recurrent HFpEF  Had this in the past with recurrent atrial fibrillation, which is why we have tried to maintain SR  Unfortunately, back in AF and not able to use amiodarone due to his pneumonitis and have had to adopt rate control strategy.  Aortic stenosis also thought to be contributing  As above, Dr. Rich recommended increasing furosemide to 80 mg BID 2/20  Blood work on Lasix 40 mg BID as above.    Weight has improved on the increased dose of furosemide 80 mg twice daily, but he is still carrying a significant amount of fluid, up 20 pounds since 7/2023  On exam, has JVD, HJR, pitting LE edema.  Lungs are clear    PLAN:  As above, switch furosemide 80 mg twice daily to torsemide  Take 40 mg (1 tab) today when you pick it up  On Sat/Sun/Mon/Tues, take 2 of the 40 mg tabs all at once (80 mg)  On Wednesday, decrease to 1 tab (40 mg)  Increase KCl to 20 mEq BID  See me back next week for close follow-up    Pulmonary Fibrosis  Amiodarone has been used off/on given c/o increasing DLEEON/HFpEF when in persistent AFib since 10/2021.  Most recently restarted after unsuccessful DCCV 9/2023   Now STOPPED 11/2023 and added to allergy list d/t pneumonitis  Has routine follow-up with Pulm    PLAN:  Continue routine follow-up with Pulmonology - plan for  follow-up  visit and testing ~3/2024    HTN  Currently on furosemide 80 mg BID, metoprolol tartrate 25 mg BID, amlodipine 5 mg daily, losartan 100 mg daily  BP is fine    PLAN:  Switch diuretic therapy as above    4. Aortic Stenosis  Mild/moderate on echo 10/2022, stable on echo 9/2023, up  to at least moderate on echo while hospitalized 2/2024    PLAN:  [facility]ointment with [facility] has been recommended and is in the process of being arranged.  Note that echo showed only moderate aortic stenosis, and may be too early to intervene,but will get their opinion        Crystal Campos PA-C, MSPAS      No orders of the defined types were placed in this encounter.    Orders Placed This Encounter   Medications    DISCONTD: furosemide (LASIX) 80 MG tablet     Sig: Take 80 mg by mouth 2 times daily    potassium chloride pushpa ER (KLOR-CON M20) 20 MEQ CR tablet     Sig: Take 1 tablet (20 mEq) by mouth 2 times daily    torsemide 40 MG TABS     Sig: Take 80 mg by mouth daily     Dispense:  60 tablet     Refill:  3     Replaces furosemide     Medications Discontinued During This Encounter   Medication Reason    furosemide (LASIX) 40 MG tablet Med Rec(No AVS / No eCancel)    furosemide (LASIX) 80 MG tablet Med Rec(No AVS / No eCancel)    amLODIPine (NORVASC) 5 MG tablet     furosemide (LASIX) 80 MG tablet     potassium chloride ER (KLOR-CON M) 20 MEQ CR tablet Reorder (No AVS)         Encounter Diagnoses   Name Primary?    Essential hypertension     Persistent atrial fibrillation (H) Yes    Benign essential hypertension     Hypokalemia     Lower extremity edema        CURRENT MEDICATIONS:  Current Outpatient Medications   Medication Sig Dispense Refill    allopurinol (ZYLOPRIM) 300 MG tablet Take 1 tablet (300 mg) by mouth daily 90 tablet 4    cetirizine (ZYRTEC) 10 MG tablet Take 10 mg by mouth as needed for allergies      finasteride (PROPECIA) 1 MG tablet Take 1 tablet (1 mg) by mouth daily 90 tablet 4    losartan (COZAAR) 100 MG tablet Take 1 tablet (100 mg) by mouth daily 90 tablet 4    metoprolol tartrate (LOPRESSOR) 25 MG tablet Take 1 tablet (25 mg) by mouth 2 times daily for 30 days 60 tablet 0    potassium chloride pushpa ER (KLOR-CON M20) 20 MEQ CR tablet Take 1 tablet (20 mEq) by mouth 2 times daily    "   predniSONE (DELTASONE) 10 MG tablet Take 6 tablets (60 mg) by mouth daily for 10 days, THEN 5 tablets (50 mg) daily for 14 days, THEN 4 tablets (40 mg) daily for 14 days, THEN 3 tablets (30 mg) daily for 14 days, THEN 3 tablets (30 mg) daily for 14 days, THEN 2 tablets (20 mg) daily for 14 days, THEN 1 tablet (10 mg) daily for 14 days, THEN 1 tablet (10 mg) every 48 hours for 14 days. 319 tablet 0    rivaroxaban ANTICOAGULANT (XARELTO ANTICOAGULANT) 20 MG TABS tablet Take 1 tablet (20 mg) by mouth daily (with dinner) 90 tablet 3    sulfamethoxazole-trimethoprim (BACTRIM DS) 800-160 MG tablet Take 1 tablet by mouth daily 90 tablet 0    torsemide 40 MG TABS Take 80 mg by mouth daily 60 tablet 3       ALLERGIES     Allergies   Allergen Reactions    Amiodarone Shortness Of Breath     Suspected amiodarone toxicity involving lungs         Review of Systems:  Skin:  Negative     Eyes:  Positive for visual blurring  ENT:  Negative    Respiratory:  Negative for sleep apnea;dyspnea on exertion  Cardiovascular:  Negative for;palpitations;chest pain edema;Positive for;exercise intolerance  Gastroenterology: Negative for melena;hematochezia  Genitourinary:  Positive for nocturia  Musculoskeletal:  Negative for muscular weakness  Neurologic:  Positive for    Psychiatric:  Negative    Heme/Lymph/Imm:  Negative    Endocrine:  Negative      Physical Exam:  Vitals: /83   Pulse 72   Ht 1.727 m (5' 8\")   Wt 135.9 kg (299 lb 8 oz)   BMI 45.54 kg/m      Constitutional:  cooperative, alert and oriented, well developed, well nourished, in no acute distress        Skin:  warm and dry to the touch, no apparent skin lesions or masses noted        Head:  normocephalic, no masses or lesions        Eyes:  pupils equal and round;conjunctivae and lids unremarkable;sclera white        ENT:      Slight R mouth droopiness - [place] Palsy    Neck:    JVP elevated;hepatojugular reflux      Chest:  normal breath sounds, clear to auscultation, " normal A-P diameter, normal symmetry, normal respiratory excursion, no use of accessory muscles        Cardiac:   irregularly irregular rhythm       systolic ejection murmur;grade 2        Abdomen:  abdomen soft obese      Vascular: pulses full and equal                                      Extremities and Back:  no deformities, clubbing, cyanosis, erythema observed   R LE cellulitis/wound    Neurological:  no gross motor deficits            PAST MEDICAL HISTORY:  Past Medical History:   Diagnosis Date    (HFpEF) heart failure with preserved ejection fraction (H)     Acute gouty arthritis     Alcohol use disorder in remission     Amiodarone pulmonary toxicity     Aortic stenosis     Basal cell carcinoma     Of the nose    Bell's palsy     Herpes simplex without mention of complication     Hyperlipidemia     Hypertension 2000    Persistent atrial fibrillation (H)     persistent, DCCV 12/2017, ablation 11/6/17    Pulmonary nodules     Sleep apnea     CPAP       PAST SURGICAL HISTORY:  Past Surgical History:   Procedure Laterality Date    ABLATION OF DYSRHYTHMIC FOCUS  2018, 04/12/2019    Procedure: EP Ablation Focal AFIB;  Surgeon: Fady Day MD;  Location:  [facility]    ACHILLES TENDON SURGERY  1997    ANESTHESIA CARDIOVERSION N/A 2/26/2019    Procedure: ANESTHESIA CARDIOVERSION (CONNER);  Surgeon: GENERIC ANESTHESIA PROVIDER;  Location:  OR    ANESTHESIA CARDIOVERSION N/A 11/19/2020    Procedure: ANESTHESIA, FOR CARDIOVERSION (dr campbell);  Surgeon: GENERIC ANESTHESIA PROVIDER;  Location:  OR    ANESTHESIA CARDIOVERSION N/A 8/22/2022    Procedure: CARDIOVERSION;  Surgeon: GENERIC ANESTHESIA PROVIDER;  Location:  OR    ANESTHESIA CARDIOVERSION N/A 9/13/2023    Procedure: Anesthesia cardioversion;  Surgeon: GENERIC ANESTHESIA PROVIDER;  Location:  OR    ANESTHESIA CARDIOVERSION N/A 10/6/2023    Procedure: Anesthesia cardioversion;  Surgeon: GENERIC ANESTHESIA PROVIDER;  Location:  OR    [facility]  CELL CARCINOMA EXCISION Right     nose, took cartilage from inner ear.    CARDIOVERSION  ,     cardioversion for atr fib    COLONOSCOPY      COLONOSCOPY N/A 3/22/2023    Procedure: COLONOSCOPY, FLEXIBLE, WITH LESION REMOVAL USING SNARE;  Surgeon: Anne Lucero MD;  Location:  GI    EP ABLATION FOCAL AFIB N/A 2019    Procedure: EP Ablation Focal AFIB;  Surgeon: Fady Day MD;  Location:  [facility]    HERNIA REPAIR, UMBILICAL  2012    Procedure: HERNIORRHAPHY UMBILICAL;  UMBILICAL HERNIA REPAIR;  Surgeon: Ra Crocker MD;  Location: [facility]    HERNIORRHAPHY UMBILICAL  2012    Procedure: HERNIORRHAPHY UMBILICAL;  UMBILICAL HERNIA REPAIR;  Surgeon: Ra Crocker MD;  Location: [facility]    ORTHOPEDIC SURGERY      ORTHOPEDIC SURGERY      OTHER SURGICAL HISTORY Right 2017    total KNEE ARTHROSCOPY     TOTAL KNEE ARTHROPLASTY Left 2018    ZZC NONSPECIFIC PROCEDURE  1997    achilles tendon    ZZC NONSPECIFIC PROCEDURE      knees, arthroscopy    ZZC NONSPECIFIC PROCEDURE      basal cell skin ca, nose    ZZC NONSPECIFIC PROCEDURE      flex sig; colonoscopy due 3/2008    ZZC NONSPECIFIC PROCEDURE      cardioversion for atr fib    ZZC TOTAL KNEE ARTHROPLASTY Left 2018    Dr. Malick Suarez       FAMILY HISTORY:  Family History   Problem Relation Age of Onset    Family History Negative Mother     Heart Disease Father         decesased at 42 - MI    Heart Disease Sister          MI    Lipids Sister     Lipids Sister     No Known Problems Sister     Heart Disease Brother          MI    Heart Disease Brother         CABG AT 49    Lung Cancer No family hx of        SOCIAL HISTORY:  Social History     Socioeconomic History    Marital status: Single     Spouse name: None    Number of children: None    Years of education: None    Highest education level: None   Tobacco Use    Smoking status: Former     Packs/day: 1.00     Years: 10.00      Additional pack years: 0.00     Total pack years: 10.00     Types: Cigarettes     Start date:      Quit date: 2011     Years since quittin.5    Smokeless tobacco: Never    Tobacco comments:     quit 2011   Vaping Use    Vaping Use: Never used   Substance and Sexual Activity    Alcohol use: Not Currently    Drug use: Never    Sexual activity: Not Currently     Partners: Female     Birth control/protection: None   Social History Narrative    Worked in retail (-) and insurance sales     [place] time on [place] [employer] [place]     Thank you for allowing me to participate in the care of your patient.      Sincerely,     Natalie Campos PA-C     [facility]  cc:   Natalie Campos PA-C  [id number] [place] [id number]

## 2024-02-23 NOTE — PROGRESS NOTES
"Assessment & Plan   Encounter for examination following treatment at hospital  Acute on chronic heart failure with preserved ejection fraction (H)  Persistent atrial fibrillation (H)  Issues managed by cardiology (who saw him earlier today and adjusted his meds). He sees them again next week. Defer cares of these issues to that service.    Open wound of knee, leg, and ankle, right, initial encounter  Cellulitis of leg, right  Approximately 4x3 cm open wound on R anterior leg. Will treat with cefadroxil as well as doxycycline to cover MRSA given appearance. Wound care referral placed.  - cefadroxil (DURICEF) 500 MG capsule; Take 1 capsule (500 mg) by mouth 2 times daily  - doxycycline hyclate (VIBRAMYCIN) 100 MG capsule; Take 1 capsule (100 mg) by mouth 2 times daily for 10 days  - Wound Care Referral; Future    Other cardiomyopathies (H)  Known issue that I take into account for their medical decisions, no current exacerbations or new concerns. Defer to cardiology.    Ascending aorta dilatation (H24)  Known issue that I take into account for their medical decisions, no current exacerbations or new concerns. Defer to cardiology.    Class 3 obesity (H)  BMI 45.    MED REC REQUIRED  Post Medication Reconciliation Status:  Discharge medications reconciled and changed, see notes/orders  BMI  Estimated body mass index is 45.22 kg/m  as calculated from the following:    Height as of an earlier encounter on 2/23/24: 1.727 m (5' 8\").    Weight as of this encounter: 134.9 kg (297 lb 6.4 oz).     Ranjan Bustamante MD, MPH  [facility]  Internal Medicine    Subjective   Brayden is a 73 year old presenting for the following health issues: Hospital F/U    [facility]   Hospital Follow-up Visit:  Hospital/Nursing Home/IP Rehab Facility: [facility]  Date of Admission: 2/11/2024  Date of Discharge: 2/13/2024  Reason(s) for Admission: CHF    Was your hospitalization related to COVID-19? " "No   Problems taking medications regularly:  None  Medication changes since discharge: None  Problems adhering to non-medication therapy:  None    Summary of hospitalization:  [facility] discharge summary reviewed  Diagnostic Tests/Treatments reviewed.  Follow up needed: none  Other Healthcare Providers Involved in Patient s Care:         Specialist appointment - cardiology  Update since discharge: fluctuating course.  Plan of care communicated with patient     I am seeing Brayden today for follow-up on a recent hospitalization. 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     PLAN   Acute diastolic CHF exacerbation and recurrent atrial fibrillation with RVR:   Recurrent afib with RVR:   Of note, Mr. Schultz is followed closely by [facility] for chronic paroxysmal atrial fibrillation and chronic HFPEF. It seems that afib was diagnosed in 2012. Flecanide was trialed until it had to be stopped for treatment failure (also a cardiac MRI noted a scar). He underwent PVI and SVC isolation in 11/2017. Repeat PVI and CTI were done for inducible Atrial Flutter in 4/2019. He underwent DCCV on 11/19/2020 for recurrent AFib, at that time in the setting of alcohol use.  From that time Amiodarone was used intermittently until recently. He had repeat DCCV in 8/22/2022, again (unsuccessfully) he had DCCV on 9/13/23, and was re-loaded with Amiodarone at that time, and then " "he underwent successful DCCV on 10/6/2023.       Most recently he was subsequently admitted here from 11/25/23 through 12/1/2023 for dyspnea, and found on Chest CT to have findings of widespread pneumonitis, consistent with amiodarone-induced lung toxicity. Therefore that was stopped. Limited TTE showed slight improvement in LV function compared with TTE from 9/2023. Bubble study was negative. Pulmonology and Cardiology were consulted. 60 mg Prednisone was started with prolonged taper over 3 months ordered. That was continued at discharge. In follow up with Pulmonology 12/18/23, PFT's showed mild restriction. Repeat CT showed substantial improvement in his pneumonitis. Zio patch was negative for recurrent afib. It has been recommended to consider repeat AFib ablation if it recurs in the absence of ETOH use.     Now, he presented for recurrent exertional dyspnea associated with progressive anasarca and reported weight gain over 20 pounds in the past two weeks.      -Appreciate cardiology consult.  He clinically improved with IV diuresis and his rate was controlled after starting metoprolol.  He was reviewed by cardiology and electrophysiology service and was advised to be discharged on higher dose of Lasix and close follow-up with Dr. Day as outpatient.  -ct Xarelto     Accelerated hypertension: ct home Cozaar, amlodipine   Ongoing leukocytosis due to Prednisone taper: For Prednisone induced pneumonitis. Currently down to 20 mg PO daily     Pulmonary nodules: Also assessed by recent CT imaging and found to have improved in addition to the pneumonitis. Follow up with  Pulmonology as an outpatient as scheduled     BOWEN: Adherent to CPAP, continued     Gout: Resume home Allopurinol\"    Today Brayden reports he saw his cardiology team earlier this morning who are managing his cardiac concerns, but recommended he discuss possible cellulitis/wound on leg with me at this follow-up visit.        Objective    /88   " Pulse 85   Temp 97  F (36.1  C)   Wt 134.9 kg (297 lb 6.4 oz)   SpO2 94%   BMI 45.22 kg/m    Body mass index is 45.22 kg/m .    Physical Exam   GENERAL: alert and in no distress.  EYES: conjunctivae/corneas clear. EOMs grossly intact  HENT: Facies symmetric.  RESP: CTAB, no w/r/r.  CV: RRR, no m/r/g.  MSK: Moves all four extremities freely.  SKIN: RLE with ~4x3 cm open wound with surrounding erythema.  NEURO: CN II-XII grossly intact.

## 2024-02-23 NOTE — TELEPHONE ENCOUNTER
Pt made aware of the change to 20 mg tabs, so that pt could  yet today.  Pt will take the 40 mg today and then aware to increase to 80 mg Sat, sun, mon and Tuesday and then drop back down to 40 mg daily. Pt states understanding. JNelsonPRABHA

## 2024-02-23 NOTE — TELEPHONE ENCOUNTER
Consult received via Phone and [employer] from patient and Ranjan Courtney MD in  INTERNAL MEDICINE for wound of the right leg    Please schedule with Stephanie Riley PA-C, Mariluz Little NP, Melvin Lawrence M.D., or Tom Cason M.D. at [facility] [place] for next available appointment.    **For all providers, Kimi Storey PA-C, Dr. Beverly, Mariluz Little NP or Dr. Cason, please schedule a follow up 2-3 weeks after initial appointment.**  --If unable to schedule within 2-3 weeks then please place on cancellation list--    Is the patient able to make their own medical decisions? Yes    Can the patient be scheduled on a Thursday? Yes    Is patient a SAFIA lift? PLEASE INQUIRE WHEN MAKING THE APPOINTMENT AND PUT IN APPOINTMENT NOTES    Routing to  Wound Healing Scheduling.

## 2024-02-26 ENCOUNTER — OFFICE VISIT (OUTPATIENT)
Dept: URGENT CARE | Facility: URGENT CARE | Age: 74
End: 2024-02-26
Payer: COMMERCIAL

## 2024-02-26 VITALS
OXYGEN SATURATION: 94 % | WEIGHT: 295 LBS | SYSTOLIC BLOOD PRESSURE: 116 MMHG | TEMPERATURE: 98.4 F | DIASTOLIC BLOOD PRESSURE: 81 MMHG | BODY MASS INDEX: 44.85 KG/M2 | HEART RATE: 74 BPM

## 2024-02-26 DIAGNOSIS — L02.419 CELLULITIS AND ABSCESS OF LEG: ICD-10-CM

## 2024-02-26 DIAGNOSIS — S81.801S LEG WOUND, RIGHT, SEQUELA: Primary | ICD-10-CM

## 2024-02-26 DIAGNOSIS — Z79.01 LONG TERM CURRENT USE OF ANTICOAGULANT THERAPY: ICD-10-CM

## 2024-02-26 DIAGNOSIS — L03.119 CELLULITIS AND ABSCESS OF LEG: ICD-10-CM

## 2024-02-26 DIAGNOSIS — M26.609 TEMPOROMANDIBULAR JOINT DISORDER: ICD-10-CM

## 2024-02-26 PROCEDURE — 99213 OFFICE O/P EST LOW 20 MIN: CPT | Performed by: PHYSICIAN ASSISTANT

## 2024-02-26 NOTE — PROGRESS NOTES
"  Assessment & Plan     Leg wound, right, sequela    Chronic leg wound right lower leg  Pt to follow up with wound center    Cellulitis and abscess of leg    Cellulitis is an infection of the deep layers of skin. A break in the skin, such as a cut or scratch, can let bacteria under the skin. If the bacteria get to deep layers of the skin, it can be serious. If not treated, cellulitis can get into the bloodstream and lymph nodes. The infection can then spread throughout the body. This causes serious illness.   Cellulitis causes the affected skin to become red, swollen, warm, and sore. The reddened areas have a visible border. An open sore may leak fluid (pus). You may have a fever, chills, and pain.   Cellulitis is treated with antibiotics taken for 7 to 10 days. An open sore may be cleaned and covered with cool wet gauze. Symptoms should get better 1 to 2 days after treatment is started. Make sure to take all the antibiotics for the full number of days until they are gone. Keep taking the medicine even if your symptoms go away.     Continue taking doxy and duricef    Temporomandibular joint disorder    Referral to [facility] TMJ and facial pain clinic  - Adult ENT  Referral; Future    Long term current use of anticoagulant therapy    Continue to follow with cardiology for chronic atrial fibrillation    Review of external notes as documented elsewhere in note  MED REC REQUIREDPost Medication Reconciliation Status: BMI  Estimated body mass index is 44.85 kg/m  as calculated from the following:    Height as of 2/23/24: 1.727 m (5' 8\").    Weight as of this encounter: 133.8 kg (295 lb).     CONSULTATION/REFERRAL to TMJ clinic    No follow-ups on file.    Jerri Owen is a 73 year old, presenting for the following health issues:  Urgent Care and Laceration (Seen 2/23/24 Rx Cefadroxil and Doxycycline 10 day course/Prensents open wound on Right lower leg onset 3 weeks ago  scrapped leg on lower base of bed " -on Xarelto blood thinner - Last TDAP 10/12/2017 )    HPI     Review of Systems  Constitutional, HEENT, cardiovascular, pulmonary, gi and gu systems are negative, except as otherwise noted.      Objective    /81 (BP Location: Left arm, Patient Position: Sitting, Cuff Size: Adult Large)   Pulse 74   Temp 98.4  F (36.9  C) (Tympanic)   Wt 133.8 kg (295 lb)   SpO2 94%   BMI 44.85 kg/m    Body mass index is 44.85 kg/m .  Physical Exam   GENERAL: alert and no distress  EYES: Eyes grossly normal to inspection, PERRL and conjunctivae and sclerae normal  HENT: Pos for clicking and pain in right TMJ  NECK: no adenopathy, no asymmetry, masses, or scars  MS: pos for right lower leg wound, healing slowly  SKIN: pos for half dollar wound  NEURO: Normal strength and tone, mentation intact and speech normal  PSYCH: mentation appears normal, affect normal/bright          Signed Electronically by: Tom Mckeon, [facility], PA-C

## 2024-02-29 NOTE — PROGRESS NOTES
[facility]    I had the pleasure of seeing Brayden when he came for follow up of AFib.  This 73 year old sees Dr. Day for his history of:    1. H/o persistent AFib. EF 50-55% 1/2022 - first dx'd in 2012. Failed flecainide (and noted scar on MRI). Underwent PVI and SVC isolation 11/2017. Repeat PVI and CTI for inducible AFlutter 4/2019. Started on Amio with DCCV 11/19/2020 for recurrent AFib in setting of increased alcohol use.   Amiodarone decreased 1/2021; back in persistent AFib 12/2021 and amiodarone stopped as felt asx'c.  This was restarted 8/2022 prior to DCCV 8/22/2022. Amiodarone stopped again 1/2023. AFib restarted 7/2023 after EtOH use, now s/p DCCV (unsuccessful 9/13), then loaded with amiodarone s/p successful DCCV 10/6/2023.  Dr. Day recommended repeat AFib ablation if he started having AFib without it being triggered by EtOH. Amiodarone stopped 11/2023 d/t discovery of pneumonitis  2. Moderate Aortic Stenosis \  3. Benign Essential HTN   4. BOWEN   5. Myocardiac Scar noted on cardiac MRI 10/2017, consistent with prior MI in the left Cx territory. EF 51%.  Avoiding flecainide use  6. H/o Alcohol dependence - no EtOH since 2017, then again 3-4 months in early 2022 after he was noted to be back in AFib.Back in AFib with EtOH use 7/2023  7. Chronic AC  On Xarelto for CHADSVASc 4 (HFpEF, HTN, age, aortic plaquing)  8. Bell's Palsy  9. Lung nodules- noted on CTA to follow-up on aorta 8/2023.   10. HFpEF - hospitalized 2/2024 for recurrent HFpEF in setting        Last Visit & Interval History:  I saw Brayden last week at which time he was terribly fluid overloaded.  He had been discharged 2/13 with an acute diastolic heart failure exacerbation, likely triggered by persistent AF (off of amiodarone due to pneumonitis) and possibly worsening aortic stenosis.  At home, weight was going up despite furosemide 40 mg  BID.  Before I saw him, Dr. Rich increased his furosemide to  80 mg BID, which he  "did not think improved his symptoms dramatically.      When I saw him, weight was 291.8# on 2/22, with significant LE edema up into the thighs and abdominal bloating.  He had injured his R LE, which she was seeing his PCP for later that day.  I stopped furosemide and started torsemide.  He was to take 40 mg the day he picked up (F), then take 80 mg x 4 days, then Wednesday 2/28, decrease to 40 mg daily.  KCl was increased.  I asked for close follow-up and seeing him back today    He saw Dr. Courtney later that day, and was started on antibiotics for R LE wound and set up with Wound Care.     Today's Visit:  Brayden is feeling \"maybe a little better.\"  Weight is down 8# at home, legs feel \"less heavy\" but still \"30-40# of water on board.\" He never reduced the torsemide to 40 mg daily on Wednesday.    Wound care couldn't get him in x 2-3 weeks so ended up going to  go get dressing supplies for wound - this looks better today on exam.    Today, he notes weakness and lightheadedness with some nausea. Just ate so hoping that makes him feel better.  BPs at home 100-130s, often <110 now.    Still winded but O2 sats >89%.       VITALS:  Vitals: /72 (BP Location: Left arm, Patient Position: Sitting, Cuff Size: Adult Regular)   Pulse 86   Ht 1.727 m (5' 8\")   Wt 132.6 kg (292 lb 4.8 oz)   SpO2 91%   BMI 44.44 kg/m      Diagnostic Testing:  Echocardiogram 2/11/2024 LVEF 55-60%.  Normal RV.  Moderate CHARBEL.  Mild MAC.  1+ MR.  Moderate aortic stenosis.  Ascending aortic dilatation (4.0 cm)  Limited Echo 11/2023 LVEF 55-60%. No RWMA. Nl RV. Mild MAC with 1+MR. Mean gradient AoV was 32 mmHg and MARILYN 1.22 cm2. DVI 0.29. Root 3.2 cm. Asc aorta 4.0 cm  ZioPatch 11/24-25/2023 (stopped early d/t hospitalization) NSR 71 bpm. Occasional ectopy. No AFIb seen.  Echo 9/6/2023 showed LVEF 49%. Nl RV. Nl LA size. 1+MR. Mod AoS with mean gradient 16mmHg, Vmax 2.7 m/s, DI 0.31  Aorta 8/2023 with aneuryamsl dilation of suprarenal aorta " "(3.6x3.8). follow-up CTA showed no aneurysm  EKG 7/2021 showed sinus bradycardia 45 bpm.  Nonspecific ST-T wave changes  Cardiac MRI 10/2017 showed an EF of 51% with mild hypokinesis involving the basal        Plan:  BMP today  See me back next week 3/7 with blood work  Decrease losartan to 50 mg daily       Assessment/Plan:    H/o Persistent AFib  Had increasing DELEON/SOB from recurrent AFib and underwent DCCV while on amiodarone 8/2022, which was decreased 10/2022, then stopped 1/2023  Had recurrent AFb 7/2023 in setting of EtOH use. Initial DCCV 9/2023 was not successful in restoring SR and amiodarone restarted 9/2023. DCCV 10/2023 restored SR  Unfortunately, hospitalized with pulmonary fibrosis 11/2023 and amiodarone was discontinued. Remained in NSR for a period of time, now back in AF  Focus has been on rate control given inability to use AAD therapy (pulmonary fibrosis with amiodarone, scar on cardiac MRI)  Has remained on anticoagulation with Xarelto for [id number] 4 (HFpEF, HTN, age, aortic plaquing)    PLAN:  Reviewed with Brayden that atrial fibrillation is certainly not helping his shortness of breath/HFpEF, but at this time, our options for treatment are limited given pulmonary toxicity with amiodarone, and scarring on MRI  Current plan is to continue rate control with metoprolol  Continue anticoagulation  See Dr. Day for long-term mgmt of AFib ~5/2024    Recurrent HFpEF  Had this in the past with recurrent atrial fibrillation, which is why we have tried to maintain SR  Unfortunately, back in AF and not able to use amiodarone due to his pneumonitis and have had to adopt rate control strategy.  Aortic stenosis also thought to be contributing, but at this time, appears moderate  After switching to torsemide last week, Brayden is still feeling \"pretty rough\" but mildly improved  Weight is down. On exam, legs are softer, HJR less robust.     PLAN:  BMP today - hope to continue current dose if BUN/Cr can " handle  See me back next week with repeat BMP    Pulmonary Fibrosis  Amiodarone has been used off/on given c/o increasing DELEON/HFpEF when in persistent AFib since 10/2021.  Most recently restarted after unsuccessful DCCV 9/2023   Now STOPPED 11/2023 and added to allergy list d/t pneumonitis  Has routine follow-up with Pulm    PLAN:  Continue routine follow-up with Pulmonology - plan for  follow-up  visit and testing ~3/2024    HTN  Currently on torsemide 80 mg daily, metoprolol tartrate 25 mg BID, amlodipine 5 mg daily, losartan 100 mg daily  BP is now too low with nausea and lightheadedness.     PLAN:  BMP today  Reduce losartan to 50 mg daily    4. Aortic Stenosis  Mild/moderate on echo 10/2022, stable on echo 9/2023, up to at least moderate on echo while hospitalized 2/2024    PLAN:  Appointment with [facility] has been recommended and is in the process of being arranged.  Note that echo showed only moderate aortic stenosis, and may be too early to intervene,but will get their opinion      The longitudinal plan of care for the diagnosis(es)/condition(s) as documented were addressed during this visit. Due to the added complexity in care, I will continue to support Brayden in the subsequent management and with ongoing continuity of care.    Crystal Campos PA-C, MSPAS      Orders Placed This Encounter   Procedures    Basic metabolic panel    Basic metabolic panel    Follow-Up with Cardiology EP    Follow-Up with Cardiology OTTO     Orders Placed This Encounter   Medications    losartan (COZAAR) 100 MG tablet     Sig: Take 0.5 tablets (50 mg) by mouth daily     Medications Discontinued During This Encounter   Medication Reason    losartan (COZAAR) 100 MG tablet            Encounter Diagnoses   Name Primary?    Persistent atrial fibrillation (H) Yes    Essential hypertension          CURRENT MEDICATIONS:  Current Outpatient Medications   Medication Sig Dispense Refill    allopurinol (ZYLOPRIM) 300 MG tablet Take  1 tablet (300 mg) by mouth daily 90 tablet 4    cefadroxil (DURICEF) 500 MG capsule Take 1 capsule (500 mg) by mouth 2 times daily 20 capsule 0    cetirizine (ZYRTEC) 10 MG tablet Take 10 mg by mouth as needed for allergies      doxycycline hyclate (VIBRAMYCIN) 100 MG capsule Take 1 capsule (100 mg) by mouth 2 times daily for 10 days 20 capsule 0    finasteride (PROPECIA) 1 MG tablet Take 1 tablet (1 mg) by mouth daily 90 tablet 4    losartan (COZAAR) 100 MG tablet Take 0.5 tablets (50 mg) by mouth daily      metoprolol tartrate (LOPRESSOR) 25 MG tablet Take 1 tablet (25 mg) by mouth 2 times daily for 30 days 60 tablet 0    potassium chloride pushpa ER (KLOR-CON M20) 20 MEQ CR tablet Take 1 tablet (20 mEq) by mouth 2 times daily      predniSONE (DELTASONE) 10 MG tablet Take 6 tablets (60 mg) by mouth daily for 10 days, THEN 5 tablets (50 mg) daily for 14 days, THEN 4 tablets (40 mg) daily for 14 days, THEN 3 tablets (30 mg) daily for 14 days, THEN 3 tablets (30 mg) daily for 14 days, THEN 2 tablets (20 mg) daily for 14 days, THEN 1 tablet (10 mg) daily for 14 days, THEN 1 tablet (10 mg) every 48 hours for 14 days. 319 tablet 0    rivaroxaban ANTICOAGULANT (XARELTO ANTICOAGULANT) 20 MG TABS tablet Take 1 tablet (20 mg) by mouth daily (with dinner) 90 tablet 3    torsemide (DEMADEX) 20 MG tablet Take 4 tablets (80 mg) by mouth daily 120 tablet 1    torsemide 40 MG TABS Take 80 mg by mouth daily 60 tablet 3       ALLERGIES     Allergies   Allergen Reactions    Amiodarone Shortness Of Breath     Suspected amiodarone toxicity involving lungs         Review of Systems:  Skin:  Negative     Eyes:  Positive for visual blurring  ENT:  Negative    Respiratory:  Positive for sleep apnea;dyspnea on exertion  Cardiovascular:  Negative for;palpitations;chest pain edema;Positive for;exercise intolerance;fatigue  Gastroenterology: Negative for melena;hematochezia  Genitourinary:  Positive for nocturia  Musculoskeletal:  Negative for  "muscular weakness  Neurologic:  Positive for    Psychiatric:  Negative    Heme/Lymph/Imm:  Negative    Endocrine:  Negative      Physical Exam:  Vitals: /72 (BP Location: Left arm, Patient Position: Sitting, Cuff Size: Adult Regular)   Pulse 86   Ht 1.727 m (5' 8\")   Wt 132.6 kg (292 lb 4.8 oz)   SpO2 91%   BMI 44.44 kg/m      Constitutional:  cooperative, alert and oriented, well developed, well nourished, in no acute distress        Skin:  warm and dry to the touch, no apparent skin lesions or masses noted        Head:  normocephalic, no masses or lesions        Eyes:  pupils equal and round;conjunctivae and lids unremarkable;sclera white        ENT:      Slight R mouth droopiness - [place] Palsy    Neck:    JVP elevated;hepatojugular reflux      Chest:  normal breath sounds, clear to auscultation, normal A-P diameter, normal symmetry, normal respiratory excursion, no use of accessory muscles        Cardiac:   irregularly irregular rhythm       systolic ejection murmur;grade 2        Abdomen:  abdomen soft obese      Vascular: pulses full and equal                                      Extremities and Back:  no deformities, clubbing, cyanosis, erythema observed   R LE cellulitis/wound    Neurological:  no gross motor deficits            PAST MEDICAL HISTORY:  Past Medical History:   Diagnosis Date    (HFpEF) heart failure with preserved ejection fraction (H)     Acute gouty arthritis     Alcohol use disorder in remission     Amiodarone pulmonary toxicity     Aortic stenosis     Basal cell carcinoma     Of the nose    Bell's palsy     Herpes simplex without mention of complication     Hyperlipidemia     Hypertension 2000    Persistent atrial fibrillation (H)     persistent, DCCV 12/2017, ablation 11/6/17    Pulmonary nodules     Sleep apnea     CPAP       PAST SURGICAL HISTORY:  Past Surgical History:   Procedure Laterality Date    ABLATION OF DYSRHYTHMIC FOCUS  2018, 04/12/2019    Procedure: EP Ablation " Focal AFIB;  Surgeon: Fady Day MD;  Location:  HE[facility] CARDI[facility] CATH LAB    ACHILLES TENDON SURGERY  1997    ANESTHESIA CARDIOVERSION N/A 2/26/2019    Procedure: ANESTHESIA CARDIOVERSION (CONNER);  Surgeon: GENERIC ANESTHESIA PROVIDER;  Location:  OR    ANESTHESIA CARDIOVERSION N/A 11/19/2020    Procedure: ANESTHESIA, FOR CARDIOVERSION (dr campbell);  Surgeon: GENERIC ANESTHESIA PROVIDER;  Location:  OR    ANESTHESIA CARDIOVERSION N/A 8/22/2022    Procedure: CARDIOVERSION;  Surgeon: GENERIC ANESTHESIA PROVIDER;  Location:  OR    ANESTHESIA CARDIOVERSION N/A 9/13/2023    Procedure: Anesthesia cardioversion;  Surgeon: GENERIC ANESTHESIA PROVIDER;  Location:  OR    ANESTHESIA CARDIOVERSION N/A 10/6/2023    Procedure: Anesthesia cardioversion;  Surgeon: GENERIC ANESTHESIA PROVIDER;  Location:  OR    BASAL CELL CARCINOMA EXCISION Right 2009    nose, took cartilage from inner ear.    CARDIOVERSION  2012, 2019    cardioversion for atr fib    COLONOSCOPY  2008    COLONOSCOPY N/A 3/22/2023    Procedure: COLONOSCOPY, FLEXIBLE, WITH LESION REMOVAL USING SNARE;  Surgeon: Anne Lucero MD;  Location:  GI    EP ABLATION FOCAL AFIB N/A 4/12/2019    Procedure: EP Ablation Focal AFIB;  Surgeon: Fady Day MD;  Location:  HEART CARDIAC CATH LAB    HERNIA REPAIR, UMBILICAL  08/20/2012    Procedure: HERNIORRHAPHY UMBILICAL;  UMBILICAL HERNIA REPAIR;  Surgeon: Ra Crocker MD;  Location: [facility]    HERNIORRHAPHY UMBILICAL  8/20/2012    Procedure: HERNIORRHAPHY UMBILICAL;  UMBILICAL HERNIA REPAIR;  Surgeon: Ra Crocker MD;  Location: [facility]    ORTHOPEDIC SURGERY      ORTHOPEDIC SURGERY      OTHER SURGICAL HISTORY Right 03/2017    total KNEE ARTHROSCOPY     TOTAL KNEE ARTHROPLASTY Left 03/16/2018    ZZC NONSPECIFIC PROCEDURE  1997    achilles tendon    ZZC NONSPECIFIC PROCEDURE      knees, arthroscopy    ZZC NONSPECIFIC PROCEDURE      basal cell skin ca, nose    ZZC NONSPECIFIC PROCEDURE      flex  sig; colonoscopy due 3/2008    ZZC NONSPECIFIC PROCEDURE      cardioversion for atr fib    ZZC TOTAL KNEE ARTHROPLASTY Left 2018    Dr. Malick Suarez       FAMILY HISTORY:  Family History   Problem Relation Age of Onset    Family History Negative Mother     Heart Disease Father         decesased at 42 - MI    Heart Disease Sister          MI    Lipids Sister     Lipids Sister     No Known Problems Sister     Heart Disease Brother          MI    Heart Disease Brother         CABG AT 49    Lung Cancer No family hx of        SOCIAL HISTORY:  Social History     Socioeconomic History    Marital status: Single     Spouse name: None    Number of children: None    Years of education: None    Highest education level: None   Tobacco Use    Smoking status: Former     Packs/day: 1.00     Years: 10.00     Additional pack years: 0.00     Total pack years: 10.00     Types: Cigarettes     Start date:      Quit date: 2011     Years since quittin.5    Smokeless tobacco: Never    Tobacco comments:     quit    Vaping Use    Vaping Use: Never used   Substance and Sexual Activity    Alcohol use: Not Currently    Drug use: Never    Sexual activity: Not Currently     Partners: Female     Birth control/protection: None   Social History Narrative    Worked in retail (-) and insurance sales     Spends some time on [place] on [place]

## 2024-03-01 ENCOUNTER — LAB (OUTPATIENT)
Dept: LAB | Facility: CLINIC | Age: 74
End: 2024-03-01
Payer: COMMERCIAL

## 2024-03-01 ENCOUNTER — DOCUMENTATION ONLY (OUTPATIENT)
Dept: CARDIOLOGY | Facility: CLINIC | Age: 74
End: 2024-03-01

## 2024-03-01 ENCOUNTER — OFFICE VISIT (OUTPATIENT)
Dept: CARDIOLOGY | Facility: CLINIC | Age: 74
End: 2024-03-01
Payer: COMMERCIAL

## 2024-03-01 VITALS
OXYGEN SATURATION: 91 % | SYSTOLIC BLOOD PRESSURE: 104 MMHG | HEIGHT: 68 IN | BODY MASS INDEX: 44.3 KG/M2 | DIASTOLIC BLOOD PRESSURE: 72 MMHG | HEART RATE: 86 BPM | WEIGHT: 292.3 LBS

## 2024-03-01 DIAGNOSIS — I10 ESSENTIAL HYPERTENSION: ICD-10-CM

## 2024-03-01 DIAGNOSIS — I48.19 PERSISTENT ATRIAL FIBRILLATION (H): Primary | ICD-10-CM

## 2024-03-01 DIAGNOSIS — I48.19 PERSISTENT ATRIAL FIBRILLATION (H): ICD-10-CM

## 2024-03-01 LAB
ANION GAP SERPL CALCULATED.3IONS-SCNC: 12 MMOL/L (ref 7–15)
BUN SERPL-MCNC: 27.4 MG/DL (ref 8–23)
CALCIUM SERPL-MCNC: 8.9 MG/DL (ref 8.8–10.2)
CHLORIDE SERPL-SCNC: 98 MMOL/L (ref 98–107)
CREAT SERPL-MCNC: 1.55 MG/DL (ref 0.67–1.17)
DEPRECATED HCO3 PLAS-SCNC: 30 MMOL/L (ref 22–29)
EGFRCR SERPLBLD CKD-EPI 2021: 47 ML/MIN/1.73M2
GLUCOSE SERPL-MCNC: 108 MG/DL (ref 70–99)
POTASSIUM SERPL-SCNC: 3.6 MMOL/L (ref 3.4–5.3)
SODIUM SERPL-SCNC: 140 MMOL/L (ref 135–145)

## 2024-03-01 PROCEDURE — G2211 COMPLEX E/M VISIT ADD ON: HCPCS | Performed by: PHYSICIAN ASSISTANT

## 2024-03-01 PROCEDURE — 80048 BASIC METABOLIC PNL TOTAL CA: CPT | Performed by: PHYSICIAN ASSISTANT

## 2024-03-01 PROCEDURE — 36415 COLL VENOUS BLD VENIPUNCTURE: CPT | Performed by: PHYSICIAN ASSISTANT

## 2024-03-01 PROCEDURE — 99214 OFFICE O/P EST MOD 30 MIN: CPT | Performed by: PHYSICIAN ASSISTANT

## 2024-03-01 RX ORDER — LOSARTAN POTASSIUM 100 MG/1
50 TABLET ORAL DAILY
Status: SHIPPED
Start: 2024-03-01 | End: 2024-05-28

## 2024-03-01 NOTE — PROGRESS NOTES
Pls let Brayden know that I reviewed BMP done after visit today.  Kidney function is worse on the torsemide 80 mg daily, but given what we talked about in clinic, I think we need to continue it at least until I see him 3/7    1.  Continue torsemide 80 mg daily  2.  Continue to limit salt in diet  3.  Continue KCl 20 mEq twice daily    Decrease losartan as we discussed in clinic given hypotension    See me back 3/7 with labs as planned  He should call me sooner if there are issues with lightheadedness that persist despite dropping losartan      Catarino-Crystal Roche      Latest Ref Rng 2/12/2024  8:40 AM 2/13/2024  8:33 AM 2/21/2024  8:02 AM 3/1/2024  12:03 PM   Sodium      135 - 145 mmol/L 140  139  140  140    Potassium      3.4 - 5.3 mmol/L 3.4  3.8  3.5  3.6    Chloride      98 - 107 mmol/L 98  98  99  98    Carbon Dioxide (CO2)      22 - 29 mmol/L 32 (H)  32 (H)  32 (H)  30 (H)    Anion Gap      7 - 15 mmol/L 10  9  9  12    Urea Nitrogen      8.0 - 23.0 mg/dL 25.3 (H)  29.5 (H)  25.4 (H)  27.4 (H)    Creatinine      0.67 - 1.17 mg/dL 1.23 (H)  1.31 (H)  1.25 (H)  1.55 (H)    GFR Estimate      >60 mL/min/1.73m2 62  58 (L)  61  47 (L)    Calcium      8.8 - 10.2 mg/dL 8.9  8.9  8.7 (L)  8.9    Glucose      70 - 99 mg/dL 111 (H)  110 (H)  117 (H)  108 (H)

## 2024-03-01 NOTE — LETTER
3/1/2024    Ranjan Courtney MD  [address] [facility] [address]    RE: Mike Schultz       Dear Colleague,     I had the pleasure of seeing Mike Schultz in the [facility].  [facility]    I had the pleasure of seeing Brayden when he came for follow up of AFib.  This 73 year old sees Dr. Day for his history of:    1. H/o persistent AFib. EF 50-55% 1/2022 - first dx'd in 2012. Failed flecainide (and noted scar on MRI). Underwent PVI and SVC isolation 11/2017. Repeat PVI and CTI for inducible AFlutter 4/2019. Started on Amio with DCCV 11/19/2020 for recurrent AFib in setting of increased alcohol use.   Amiodarone decreased 1/2021; back in persistent AFib 12/2021 and amiodarone stopped as felt asx'c.  This was restarted 8/2022 prior to DCCV 8/22/2022. Amiodarone stopped again 1/2023. AFib restarted 7/2023 after EtOH use, now s/p DCCV (unsuccessful 9/13), then loaded with amiodarone s/p successful DCCV 10/6/2023.  Dr. Day recommended repeat AFib ablation if he started having AFib without it being triggered by EtOH. Amiodarone stopped 11/2023 d/t discovery of pneumonitis  2. Moderate Aortic Stenosis \  3. Benign Essential HTN   4. BOWEN   5. Myocardiac Scar noted on cardiac MRI 10/2017, consistent with prior MI in the left Cx territory. EF 51%.  Avoiding flecainide use  6. H/o Alcohol dependence - no EtOH since 2017, then again 3-4 months in early 2022 after he was noted to be back in AFib.Back in AFib with EtOH use 7/2023  7. Chronic AC  On Xarelto for CHADSVASc 4 (HFpEF, HTN, age, aortic plaquing)  8. Bell's Palsy  9. Lung nodules- noted on CTA to follow-up on aorta 8/2023.   10. HFpEF - hospitalized 2/2024 for recurrent HFpEF in setting        Last Visit & Interval History:  I saw Brayden last week at which time he was terribly fluid overloaded.  He had been discharged 2/13 with an acute diastolic heart failure exacerbation, likely triggered by persistent AF (off of amiodarone  "due to pneumonitis) and possibly worsening aortic stenosis.  At home, weight was going up despite furosemide 40 mg  BID.  Before I saw him, Dr. Rich increased his furosemide to  80 mg BID, which he did not think improved his symptoms dramatically.      When I saw him, weight was 291.8# on 2/22, with significant LE edema up into the thighs and abdominal bloating.  He had injured his R LE, which she was seeing his PCP for later that day.  I stopped furosemide and started torsemide.  He was to take 40 mg the day he picked up (F), then take 80 mg x 4 days, then Wednesday 2/28, decrease to 40 mg daily.  KCl was increased.  I asked for close follow-up and seeing him back today    He saw Dr. Courtney later that day, and was started on antibiotics for R LE wound and set up with Wound Care.     Today's Visit:  Brayden is feeling \"maybe a little better.\"  Weight is down 8# at home, legs feel \"less heavy\" but still \"30-40# of water on board.\" He never reduced the torsemide to 40 mg daily on Wednesday.    Wound care couldn't get him in x 2-3 weeks so ended up going to  go get dressing supplies for wound - this looks better today on exam.    Today, he notes weakness and lightheadedness with some nausea. Just ate so hoping that makes him feel better.  BPs at home 100-130s, often <110 now.    Still winded but O2 sats >89%.       VITALS:  Vitals: /72 (BP Location: Left arm, Patient Position: Sitting, Cuff Size: Adult Regular)   Pulse 86   Ht 1.727 m (5' 8\")   Wt 132.6 kg (292 lb 4.8 oz)   SpO2 91%   BMI 44.44 kg/m      Diagnostic Testing:  Echocardiogram 2/11/2024 LVEF 55-60%.  Normal RV.  Moderate CHARBEL.  Mild MAC.  1+ MR.  Moderate aortic stenosis.  Ascending aortic dilatation (4.0 cm)  Limited Echo 11/2023 LVEF 55-60%. No RWMA. Nl RV. Mild MAC with 1+MR. Mean gradient AoV was 32 mmHg and MARILYN 1.22 cm2. DVI 0.29. Root 3.2 cm. Asc aorta 4.0 cm  ZioPatch 11/24-25/2023 (stopped early d/t hospitalization) NSR 71 bpm. " Occasional ectopy. No AFIb seen.  Echo 9/6/2023 showed LVEF 49%. Nl RV. Nl LA size. 1+MR. Mod AoS with mean gradient 16mmHg, Vmax 2.7 m/s, DI 0.31  Aorta 8/2023 with aneuryamsl dilation of suprarenal aorta (3.6x3.8). follow-up CTA showed no aneurysm  EKG 7/2021 showed sinus bradycardia 45 bpm.  Nonspecific ST-T wave changes  Cardiac MRI 10/2017 showed an EF of 51% with mild hypokinesis involving the basal        Plan:  BMP today  See me back next week 3/7 with blood work  Decrease losartan to 50 mg daily       Assessment/Plan:    H/o Persistent AFib  Had increasing DELEON/SOB from recurrent AFib and underwent DCCV while on amiodarone 8/2022, which was decreased 10/2022, then stopped 1/2023  Had recurrent AFb 7/2023 in setting of EtOH use. Initial DCCV 9/2023 was not successful in restoring SR and amiodarone restarted 9/2023. DCCV 10/2023 restored SR  Unfortunately, hospitalized with pulmonary fibrosis 11/2023 and amiodarone was discontinued. Remained in NSR for a period of time, now back in AF  Focus has been on rate control given inability to use AAD therapy (pulmonary fibrosis with amiodarone, scar on cardiac MRI)  Has remained on anticoagulation with Xarelto for [id number] 4 (HFpEF, HTN, age, aortic plaquing)    PLAN:  Reviewed with Brayden that atrial fibrillation is certainly not helping his shortness of breath/HFpEF, but at this time, our options for treatment are limited given pulmonary toxicity with amiodarone, and scarring on MRI  Current plan is to continue rate control with metoprolol  Continue anticoagulation  See Dr. Day for long-term mgmt of AFib ~5/2024    Recurrent HFpEF  Had this in the past with recurrent atrial fibrillation, which is why we have tried to maintain SR  Unfortunately, back in AF and not able to use amiodarone due to his pneumonitis and have had to adopt rate control strategy.  Aortic stenosis also thought to be contributing, but at this time, appears moderate  After switching to  "torsemide last week, Brayden is still feeling \"pretty rough\" but mildly improved  Weight is down. On exam, legs are softer, HJR less robust.     PLAN:  BMP today - hope to continue current dose if BUN/Cr can handle  See me back next week with repeat BMP    Pulmonary Fibrosis  Amiodarone has been used off/on given c/o increasing DELEON/HFpEF when in persistent AFib since 10/2021.  Most recently restarted after unsuccessful DCCV 9/2023   Now STOPPED 11/2023 and added to allergy list d/t pneumonitis  Has routine follow-up with Pulm    PLAN:  Continue routine follow-up with Pulmonology - plan for  follow-up  visit and testing ~3/2024    HTN  Currently on torsemide 80 mg daily, metoprolol tartrate 25 mg BID, amlodipine 5 mg daily, losartan 100 mg daily  BP is now too low with nausea and lightheadedness.     PLAN:  BMP today  Reduce losartan to 50 mg daily    4. Aortic Stenosis  Mild/moderate on echo 10/2022, stable on echo 9/2023, up to at least moderate on echo while hospitalized 2/2024    PLAN:  Appointment with [facility] has been recommended and is in the process of being arranged.  Note that echo showed only moderate aortic stenosis, and may be too early to intervene,but will get their opinion      The longitudinal plan of care for the diagnosis(es)/condition(s) as documented were addressed during this visit. Due to the added complexity in care, I will continue to support Brayden in the subsequent management and with ongoing continuity of care.    Crystal Campos PA-C, MSPAS      Orders Placed This Encounter   Procedures    Basic metabolic panel    Basic metabolic panel    Follow-Up with Cardiology EP    Follow-Up with Cardiology OTTO     Orders Placed This Encounter   Medications    losartan (COZAAR) 100 MG tablet     Sig: Take 0.5 tablets (50 mg) by mouth daily     Medications Discontinued During This Encounter   Medication Reason    losartan (COZAAR) 100 MG tablet            Encounter Diagnoses   Name Primary? "    Persistent atrial fibrillation (H) Yes    Essential hypertension          CURRENT MEDICATIONS:  Current Outpatient Medications   Medication Sig Dispense Refill    allopurinol (ZYLOPRIM) 300 MG tablet Take 1 tablet (300 mg) by mouth daily 90 tablet 4    cefadroxil (DURICEF) 500 MG capsule Take 1 capsule (500 mg) by mouth 2 times daily 20 capsule 0    cetirizine (ZYRTEC) 10 MG tablet Take 10 mg by mouth as needed for allergies      doxycycline hyclate (VIBRAMYCIN) 100 MG capsule Take 1 capsule (100 mg) by mouth 2 times daily for 10 days 20 capsule 0    finasteride (PROPECIA) 1 MG tablet Take 1 tablet (1 mg) by mouth daily 90 tablet 4    losartan (COZAAR) 100 MG tablet Take 0.5 tablets (50 mg) by mouth daily      metoprolol tartrate (LOPRESSOR) 25 MG tablet Take 1 tablet (25 mg) by mouth 2 times daily for 30 days 60 tablet 0    potassium chloride pushpa ER (KLOR-CON M20) 20 MEQ CR tablet Take 1 tablet (20 mEq) by mouth 2 times daily      predniSONE (DELTASONE) 10 MG tablet Take 6 tablets (60 mg) by mouth daily for 10 days, THEN 5 tablets (50 mg) daily for 14 days, THEN 4 tablets (40 mg) daily for 14 days, THEN 3 tablets (30 mg) daily for 14 days, THEN 3 tablets (30 mg) daily for 14 days, THEN 2 tablets (20 mg) daily for 14 days, THEN 1 tablet (10 mg) daily for 14 days, THEN 1 tablet (10 mg) every 48 hours for 14 days. 319 tablet 0    rivaroxaban ANTICOAGULANT (XARELTO ANTICOAGULANT) 20 MG TABS tablet Take 1 tablet (20 mg) by mouth daily (with dinner) 90 tablet 3    torsemide (DEMADEX) 20 MG tablet Take 4 tablets (80 mg) by mouth daily 120 tablet 1    torsemide 40 MG TABS Take 80 mg by mouth daily 60 tablet 3       ALLERGIES     Allergies   Allergen Reactions    Amiodarone Shortness Of Breath     Suspected amiodarone toxicity involving lungs         Review of Systems:  Skin:  Negative     Eyes:  Positive for visual blurring  ENT:  Negative    Respiratory:  Positive for sleep apnea;dyspnea on exertion  Cardiovascular:   "Negative for;palpitations;chest pain edema;Positive for;exercise intolerance;fatigue  Gastroenterology: Negative for melena;hematochezia  Genitourinary:  Positive for nocturia  Musculoskeletal:  Negative for muscular weakness  Neurologic:  Positive for    Psychiatric:  Negative    Heme/Lymph/Imm:  Negative    Endocrine:  Negative      Physical Exam:  Vitals: /72 (BP Location: Left arm, Patient Position: Sitting, Cuff Size: Adult Regular)   Pulse 86   Ht 1.727 m (5' 8\")   Wt 132.6 kg (292 lb 4.8 oz)   SpO2 91%   BMI 44.44 kg/m      Constitutional:  cooperative, alert and oriented, well developed, well nourished, in no acute distress        Skin:  warm and dry to the touch, no apparent skin lesions or masses noted        Head:  normocephalic, no masses or lesions        Eyes:  pupils equal and round;conjunctivae and lids unremarkable;sclera white        ENT:      Slight R mouth droopiness - Crane Palsy    Neck:    JVP elevated;hepatojugular reflux      Chest:  normal breath sounds, clear to auscultation, normal A-P diameter, normal symmetry, normal respiratory excursion, no use of accessory muscles        Cardiac:   irregularly irregular rhythm       systolic ejection murmur;grade 2        Abdomen:  abdomen soft obese      Vascular: pulses full and equal                                      Extremities and Back:  no deformities, clubbing, cyanosis, erythema observed   R LE cellulitis/wound    Neurological:  no gross motor deficits            PAST MEDICAL HISTORY:  Past Medical History:   Diagnosis Date    (HFpEF) heart failure with preserved ejection fraction (H)     Acute gouty arthritis     Alcohol use disorder in remission     Amiodarone pulmonary toxicity     Aortic stenosis     Basal cell carcinoma     Of the nose    Bell's palsy     Herpes simplex without mention of complication     Hyperlipidemia     Hypertension 2000    Persistent atrial fibrillation (H)     persistent, DCC 12/2017, ablation " 11/6/17    Pulmonary nodules     Sleep apnea     CPAP       PAST SURGICAL HISTORY:  Past Surgical History:   Procedure Laterality Date    ABLATION OF DYSRHYTHMIC FOCUS  2018, 04/12/2019    Procedure: EP Ablation Focal AFIB;  Surgeon: Fady Day MD;  Location:  HEART CARD[facility] CATH LAB    ACHILLES TENDON SURGERY  1997    ANESTHESIA CARDIOVERSION N/A 2/26/2019    Procedure: ANESTHESIA CARDIOVERSION (CONNER);  Surgeon: GENERIC ANESTHESIA PROVIDER;  Location:  OR    ANESTHESIA CARDIOVERSION N/A 11/19/2020    Procedure: ANESTHESIA, FOR CARDIOVERSION (dr campbell);  Surgeon: GENERIC ANESTHESIA PROVIDER;  Location:  OR    ANESTHESIA CARDIOVERSION N/A 8/22/2022    Procedure: CARDIOVERSION;  Surgeon: GENERIC ANESTHESIA PROVIDER;  Location:  OR    ANESTHESIA CARDIOVERSION N/A 9/13/2023    Procedure: Anesthesia cardioversion;  Surgeon: GENERIC ANESTHESIA PROVIDER;  Location:  OR    ANESTHESIA CARDIOVERSION N/A 10/6/2023    Procedure: Anesthesia cardioversion;  Surgeon: GENERIC ANESTHESIA PROVIDER;  Location:  OR    BASAL CELL CARCINOMA EXCISION Right 2009    nose, took cartilage from inner ear.    CARDIOVERSION  2012, 2019    cardioversion for atr fib    COLONOSCOPY  2008    COLONOSCOPY N/A 3/22/2023    Procedure: COLONOSCOPY, FLEXIBLE, WITH LESION REMOVAL USING SNARE;  Surgeon: Anne Lucero MD;  Location:  GI    EP ABLATION FOCAL AFIB N/A 4/12/2019    Procedure: EP Ablation Focal AFIB;  Surgeon: Fady Day MD;  Location:  HEART CARDI[facility] CATH LAB    HERNIA REPAIR, UMBILICAL  08/20/2012    Procedure: HERNIORRHAPHY UMBILICAL;  UMBILICAL HERNIA REPAIR;  Surgeon: Ra Crocker MD;  Location: [facility]    HERNIORRHAPHY UMBILICAL  8/20/2012    Procedure: HERNIORRHAPHY UMBILICAL;  UMBILICAL HERNIA REPAIR;  Surgeon: Ra Crocker MD;  Location: [facility]    ORTHOPEDIC SURGERY      ORTHOPEDIC SURGERY      OTHER SURGICAL HISTORY Right 03/2017    total KNEE ARTHROSCOPY     TOTAL KNEE ARTHROPLASTY Left  2018    Z NONSPECIFIC PROCEDURE      achilles tendon    Z NONSPECIFIC PROCEDURE      knees, arthroscopy    ZZC NONSPECIFIC PROCEDURE      basal cell skin ca, nose    ZZC NONSPECIFIC PROCEDURE      flex sig; colonoscopy due 3/2008    ZZ NONSPECIFIC PROCEDURE      cardioversion for atr fib    ZZC TOTAL KNEE ARTHROPLASTY Left 2018    Dr. Malick Suarez       FAMILY HISTORY:  Family History   Problem Relation Age of Onset    Family History Negative Mother     Heart Disease Father         decesased at 42 - MI    Heart Disease Sister          MI    Lipids Sister     Lipids Sister     No Known Problems Sister     Heart Disease Brother          MI    Heart Disease Brother         CABG AT 49    Lung Cancer No family hx of        SOCIAL HISTORY:  Social History     Socioeconomic History    Marital status: Single     Spouse name: None    Number of children: None    Years of education: None    Highest education level: None   Tobacco Use    Smoking status: Former     Packs/day: 1.00     Years: 10.00     Additional pack years: 0.00     Total pack years: 10.00     Types: Cigarettes     Start date:      Quit date: 2011     Years since quittin.5    Smokeless tobacco: Never    Tobacco comments:     quit    Vaping Use    Vaping Use: Never used   Substance and Sexual Activity    Alcohol use: Not Currently    Drug use: Never    Sexual activity: Not Currently     Partners: Female     Birth control/protection: None   Social History Narrative    Worked in retail (7-) and insurance sales     Spends some time on [place] on [facility] [address]     Thank you for allowing me to participate in the care of your patient.      Sincerely,     Natalie Campos PA-C     [facility]  cc:   No referring provider defined for this encounter.

## 2024-03-01 NOTE — PROGRESS NOTES
Spoke to patient to review BMP results and TOBI Rivas recommendations:  Pls let Brayden know that I reviewed BMP done after visit today.  Kidney function is worse on the torsemide 80 mg daily, but given what we talked about in clinic, I think we need to continue it at least until I see him 3/7     1.  Continue torsemide 80 mg daily  2.  Continue to limit salt in diet  3.  Continue KCl 20 mEq twice daily     Decrease losartan as we discussed in clinic given hypotension     See me back 3/7 with labs as planned  He should call me sooner if there are issues with lightheadedness that persist despite dropping losartan    Patient provided verbal understanding.  PRABHA Palomo

## 2024-03-01 NOTE — PATIENT INSTRUCTIONS
Brayden - it was nice to see you today    Reviewed torsemide seems to have gotten some additional fluid off - weight is down  Feeling crappy today (nausea, lightheadedness) - BP is also low    PLAN:  Blood work today - we'll call with results and instructions   Decrease losartan to 50 mg daily (cut current pills in 1/2) to help keep BP up while you're on the fahad dose torsemide  See Dr. Day for AFib mangament  See me 3/7 with repeat blood work  [phone]

## 2024-03-06 NOTE — PROGRESS NOTES
"[facility]    I had the pleasure of seeing Brayden when he came for follow up of AFib and HF.  This 73 year old sees Dr. Day for his history of:    1. H/o persistent AFib. EF 50-55% 1/2022 - first dx'd in 2012. Failed flecainide (and noted scar on MRI). Underwent PVI and SVC isolation 11/2017. Repeat PVI and CTI for inducible AFlutter 4/2019. Started on Amio with DCCV 11/19/2020 for recurrent AFib in setting of increased alcohol use.   Amiodarone decreased 1/2021; back in persistent AFib 12/2021 and amiodarone stopped as felt asx'c.  This was restarted 8/2022 prior to DCCV 8/22/2022. Amiodarone stopped again 1/2023. AFib restarted 7/2023 after EtOH use, now s/p DCCV (unsuccessful 9/13), then loaded with amiodarone s/p successful DCCV 10/6/2023.  Dr. Day recommended repeat AFib ablation if he started having AFib without it being triggered by EtOH. Amiodarone stopped 11/2023 d/t discovery of pneumonitis  2. Moderate Aortic Stenosis \  3. Benign Essential HTN   4. BOWEN   5. Myocardiac Scar noted on cardiac MRI 10/2017, consistent with prior MI in the left Cx territory. EF 51%.  Avoiding flecainide use  6. H/o Alcohol dependence - no EtOH since 2017, then again 3-4 months in early 2022 after he was noted to be back in AFib.Back in AFib with EtOH use 7/2023  7. Chronic AC  On Xarelto for CHADSVASc 4 (HFpEF, HTN, age, aortic plaquing)  8. Bell's Palsy  9. Lung nodules- noted on CTA to follow-up on aorta 8/2023.   10. HFpEF - hospitalized 2/2024 for recurrent HFpEF in setting        Last Visit & Interval History:  I saw Brayden last week at which time he was feeling \"a little better\" with weight down 8# and legs feeling \"less heavy.\" O2 sats were better ~>89%. He had remained on torsemide 80 mg daily. He remained on Abx for shin wound. He noted some mild lightheadedness and weakness/nausea and BP was low. Follow-up blood work showed slight bump in Cr. I continued torsemide, asked him to limit salt in diet " "and reduced losartan.    Today's Visit:  Brayden is feeling a bit better than when he saw me last week.  BP is higher, and he has no lightheadedness, nausea or other concerns.  He is down another 4# at home.  Remains without orthopnea or PND.  Still with belly fullness and DELEON.    He thinks his shortness of breath is improved, but still notes this mostly on stairs.  O2 levels 89-90% with activity, 93-97%.    Weaning off prednisone - finally on every other day, which he is really excited about.  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    Denies fever/chills - R shin wound is healing, and is more shallow now.        VITALS:  Vitals: /75   Pulse 98   Ht 1.715 m (5' 7.5\")   Wt 132.5 kg (292 lb)   SpO2 97%   BMI 45.06 kg/m      Diagnostic Testing:  Echocardiogram 2/11/2024 LVEF 55-60%.  Normal RV.  Moderate CHARBEL.  Mild MAC.  1+ MR.  Moderate aortic stenosis.  Ascending aortic dilatation (4.0 cm)  Limited Echo 11/2023 LVEF 55-60%. No RWMA. Nl RV. Mild MAC with 1+MR. Mean gradient AoV was 32 mmHg and MARILYN 1.22 cm2. DVI 0.29. Root 3.2 cm. Asc aorta 4.0 cm  ZioPatch 11/24-25/2023 (stopped early d/t hospitalization) NSR 71 bpm. Occasional ectopy. No AFIb seen.  Echo 9/6/2023 showed LVEF 49%. Nl RV. Nl LA size. 1+MR. Mod AoS with mean gradient 16mmHg, Vmax 2.7 m/s, DI 0.31  Aorta 8/2023 with aneuryamsl dilation of suprarenal aorta (3.6x3.8). follow-up CTA showed no aneurysm  EKG 7/2021 showed sinus bradycardia 45 bpm.  Nonspecific ST-T wave changes  Cardiac MRI 10/2017 showed an EF of 51% with mild hypokinesis involving the basal        Plan:  Await BMP before adjusting torsemide and/or adding spironolactone back  Updated echo to assess aortic valve 5/2024      Assessment/Plan:    H/o Persistent AFib  Had increasing DELEON/SOB from recurrent AFib and underwent DCCV while on amiodarone 8/2022, which was decreased 10/2022, then stopped 1/2023  Had " recurrent AFb 7/2023 in setting of EtOH use. Initial DCCV 9/2023 was not successful in restoring SR and amiodarone restarted 9/2023. DCCV 10/2023 restored SR  Unfortunately, hospitalized with pulmonary fibrosis 11/2023 and amiodarone was discontinued. Remained in NSR for a period of time, now back in AF  Focus has been on rate control given inability to use AAD therapy (pulmonary fibrosis with amiodarone, scar on cardiac MRI)  Has remained on anticoagulation with Xarelto for [id number] 4 (HFpEF, HTN, age, aortic plaquing)    PLAN:  Reviewed with Brayden that atrial fibrillation is certainly not helping his shortness of breath/HFpEF, but at this time, our options for treatment are limited given pulmonary toxicity with amiodarone, and scarring on MRI  Current plan is to continue rate control with metoprolol  Continue anticoagulation  See Dr. Day for long-term mgmt of AFib ~5/2024    Recurrent HFpEF  Had this in the past with recurrent atrial fibrillation, which is why we have tried to maintain SR  Unfortunately, back in AF and not able to use amiodarone due to his pneumonitis and have had to adopt rate control strategy.  Aortic stenosis also thought to be contributing, but at this time, appears moderate  Switching Lasix to torsemide 80 mg daily on 2/23 seemed to improve weight/breathing/edema  Weight continues to improve, down another 4 pounds at home    PLAN:  Await BMP  See me back 3/21.  Will get BMP after show he does not wait for results    Pulmonary Fibrosis  Amiodarone has been used off/on given c/o increasing DELEON/HFpEF when in persistent AFib since 10/2021.  Most recently restarted after unsuccessful DCCV 9/2023   Now STOPPED 11/2023 and added to allergy list d/t pneumonitis  Has routine follow-up with Pulm    PLAN:  Continue routine follow-up with Pulmonology - plan for follow-up visit and testing ~3/2024    HTN  Currently on torsemide 80 mg daily, metoprolol tartrate 25 mg BID, amlodipine 5 mg daily.  Losartan reduced from 100 mg to 50 mg daily during visit 3/1 d/t hypotension, lightheadedness, nausea  He feels better on the lower dose of losartan continue current meds    PLAN:  Continue current medications    4. Aortic Stenosis  Mild/moderate on echo 10/2022, stable on echo 9/2023, up to at least moderate on echo while hospitalized 2/2024    PLAN:  Appointment with [facility] has been recommended and is in the process of being arranged.  Note that echo showed only moderate aortic stenosis, and may be too early to intervene,but will get their opinion  Repeat echo 5/2024.       The longitudinal plan of care for the diagnosis(es)/condition(s) as documented were addressed during this visit. Due to the added complexity in care, I will continue to support Brayden in the subsequent management and with ongoing continuity of care.    Crystal Campos PA-C, MSPAS      Orders Placed This Encounter   Procedures    Basic metabolic panel    Follow-Up with Cardiology OTTO    Echocardiogram Complete     No orders of the defined types were placed in this encounter.    There are no discontinued medications.          Encounter Diagnoses   Name Primary?    Persistent atrial fibrillation (H)     Essential hypertension     Nonrheumatic aortic valve stenosis Yes           CURRENT MEDICATIONS:  Current Outpatient Medications   Medication Sig Dispense Refill    allopurinol (ZYLOPRIM) 300 MG tablet Take 1 tablet (300 mg) by mouth daily 90 tablet 4    cetirizine (ZYRTEC) 10 MG tablet Take 10 mg by mouth as needed for allergies      finasteride (PROPECIA) 1 MG tablet Take 1 tablet (1 mg) by mouth daily 90 tablet 4    losartan (COZAAR) 100 MG tablet Take 0.5 tablets (50 mg) by mouth daily      metoprolol tartrate (LOPRESSOR) 25 MG tablet Take 1 tablet (25 mg) by mouth 2 times daily for 30 days 60 tablet 0    potassium chloride pushpa ER (KLOR-CON M20) 20 MEQ CR tablet Take 1 tablet (20 mEq) by mouth 2 times daily      predniSONE  "(DELTASONE) 10 MG tablet Take 6 tablets (60 mg) by mouth daily for 10 days, THEN 5 tablets (50 mg) daily for 14 days, THEN 4 tablets (40 mg) daily for 14 days, THEN 3 tablets (30 mg) daily for 14 days, THEN 3 tablets (30 mg) daily for 14 days, THEN 2 tablets (20 mg) daily for 14 days, THEN 1 tablet (10 mg) daily for 14 days, THEN 1 tablet (10 mg) every 48 hours for 14 days. 319 tablet 0    rivaroxaban ANTICOAGULANT (XARELTO ANTICOAGULANT) 20 MG TABS tablet Take 1 tablet (20 mg) by mouth daily (with dinner) 90 tablet 3    torsemide (DEMADEX) 20 MG tablet Take 4 tablets (80 mg) by mouth daily 120 tablet 1    cefadroxil (DURICEF) 500 MG capsule Take 1 capsule (500 mg) by mouth 2 times daily (Patient not taking: Reported on 3/7/2024) 20 capsule 0    torsemide 40 MG TABS Take 80 mg by mouth daily (Patient not taking: Reported on 3/7/2024) 60 tablet 3       ALLERGIES     Allergies   Allergen Reactions    Amiodarone Shortness Of Breath     Suspected amiodarone toxicity involving lungs         Review of Systems:  Skin:  Negative     Eyes:  Positive for visual blurring  ENT:  Negative    Respiratory:  Positive for sleep apnea;dyspnea on exertion  Cardiovascular:  Negative for;palpitations;chest pain edema;Positive for;exercise intolerance;fatigue  Gastroenterology: Negative for melena;hematochezia  Genitourinary:  Positive for nocturia  Musculoskeletal:  Negative for muscular weakness  Neurologic:  Positive for    Psychiatric:  Negative    Heme/Lymph/Imm:  Negative    Endocrine:  Negative      Physical Exam:  Vitals: /75   Pulse 98   Ht 1.715 m (5' 7.5\")   Wt 132.5 kg (292 lb)   SpO2 97%   BMI 45.06 kg/m      Constitutional:  cooperative, alert and oriented, well developed, well nourished, in no acute distress        Skin:  warm and dry to the touch, no apparent skin lesions or masses noted        Head:  normocephalic, no masses or lesions        Eyes:  pupils equal and round;conjunctivae and lids " unremarkable;sclera white        ENT:      Slight R mouth droopiness - Sanderson Palsy    Neck:    JVP elevated;hepatojugular reflux      Chest:  normal breath sounds, clear to auscultation, normal A-P diameter, normal symmetry, normal respiratory excursion, no use of accessory muscles        Cardiac:   irregularly irregular rhythm       systolic ejection murmur;grade 2        Abdomen:  abdomen soft obese      Vascular: pulses full and equal                                      Extremities and Back:  no deformities, clubbing, cyanosis, erythema observed   R LE cellulitis/wound    Neurological:  no gross motor deficits            PAST MEDICAL HISTORY:  Past Medical History:   Diagnosis Date    (HFpEF) heart failure with preserved ejection fraction (H)     Acute gouty arthritis     Alcohol use disorder in remission     Amiodarone pulmonary toxicity     Aortic stenosis     Basal cell carcinoma     Of the nose    Bell's palsy     Herpes simplex without mention of complication     Hyperlipidemia     Hypertension 2000    Persistent atrial fibrillation (H)     persistent, DCCV 12/2017, ablation 11/6/17    Pulmonary nodules     Sleep apnea     CPAP       PAST SURGICAL HISTORY:  Past Surgical History:   Procedure Laterality Date    ABLATION OF DYSRHYTHMIC FOCUS  2018, 04/12/2019    Procedure: EP Ablation Focal AFIB;  Surgeon: Fady Day MD;  Location:  [facility]    ACHILLES TENDON SURGERY  1997    ANESTHESIA CARDIOVERSION N/A 2/26/2019    Procedure: ANESTHESIA CARDIOVERSION (CONNER);  Surgeon: GENERIC ANESTHESIA PROVIDER;  Location:  OR    ANESTHESIA CARDIOVERSION N/A 11/19/2020    Procedure: ANESTHESIA, FOR CARDIOVERSION (dr campbell);  Surgeon: GENERIC ANESTHESIA PROVIDER;  Location:  OR    ANESTHESIA CARDIOVERSION N/A 8/22/2022    Procedure: CARDIOVERSION;  Surgeon: GENERIC ANESTHESIA PROVIDER;  Location:  OR    ANESTHESIA CARDIOVERSION N/A 9/13/2023    Procedure: Anesthesia cardioversion;  Surgeon:  GENERIC ANESTHESIA PROVIDER;  Location:  OR    ANESTHESIA CARDIOVERSION N/A 10/6/2023    Procedure: Anesthesia cardioversion;  Surgeon: GENERIC ANESTHESIA PROVIDER;  Location:  OR    BASAL CELL CARCINOMA EXCISION Right 2009    nose, took cartilage from inner ear.    CARDIOVERSION  2019    cardioversion for atr fib    COLONOSCOPY      COLONOSCOPY N/A 3/22/2023    Procedure: COLONOSCOPY, FLEXIBLE, WITH LESION REMOVAL USING SNARE;  Surgeon: Anne Lucero MD;  Location:  GI    EP ABLATION FOCAL AFIB N/A 2019    Procedure: EP Ablation Focal AFIB;  Surgeon: Fady Day MD;  Location:  [facility]    HERNIA REPAIR, UMBILICAL  2012    Procedure: HERNIORRHAPHY UMBILICAL;  UMBILICAL HERNIA REPAIR;  Surgeon: Ra Crocker MD;  Location: [facility]    HERNIORRHAPHY UMBILICAL  2012    Procedure: HERNIORRHAPHY UMBILICAL;  UMBILICAL HERNIA REPAIR;  Surgeon: Ra Crocker MD;  Location: [facility]    ORTHOPEDIC SURGERY      ORTHOPEDIC SURGERY      OTHER SURGICAL HISTORY Right 2017    total KNEE ARTHROSCOPY     TOTAL KNEE ARTHROPLASTY Left 2018    ZZC NONSPECIFIC PROCEDURE  1997    achilles tendon    ZZC NONSPECIFIC PROCEDURE      knees, arthroscopy    ZZC NONSPECIFIC PROCEDURE      basal cell skin ca, nose    ZZC NONSPECIFIC PROCEDURE      flex sig; colonoscopy due 3/2008    ZZC NONSPECIFIC PROCEDURE      cardioversion for atr fib    ZZC TOTAL KNEE ARTHROPLASTY Left 2018    Dr. Malick Suarez       FAMILY HISTORY:  Family History   Problem Relation Age of Onset    Family History Negative Mother     Heart Disease Father         decesased at 42 - MI    Heart Disease Sister          MI    Lipids Sister     Lipids Sister     No Known Problems Sister     Heart Disease Brother          MI    Heart Disease Brother         CABG AT 49    Lung Cancer No family hx of        SOCIAL HISTORY:  Social History     Socioeconomic History    Marital status:  Single   Tobacco Use    Smoking status: Former     Packs/day: 1.00     Years: 10.00     Additional pack years: 0.00     Total pack years: 10.00     Types: Cigarettes     Start date:      Quit date: 2011     Years since quittin.5    Smokeless tobacco: Never    Tobacco comments:     quit    Vaping Use    Vaping Use: Never used   Substance and Sexual Activity    Alcohol use: Not Currently    Drug use: Never    Sexual activity: Not Currently     Partners: Female     Birth control/protection: None   Social History Narrative    Worked in retail (-) and insurance sales     Spends some time on [place] on [place]

## 2024-03-07 ENCOUNTER — LAB (OUTPATIENT)
Dept: LAB | Facility: CLINIC | Age: 74
End: 2024-03-07
Payer: COMMERCIAL

## 2024-03-07 ENCOUNTER — HOSPITAL ENCOUNTER (OUTPATIENT)
Dept: RESPIRATORY THERAPY | Facility: CLINIC | Age: 74
Discharge: HOME | End: 2024-03-07
Attending: INTERNAL MEDICINE | Admitting: INTERNAL MEDICINE
Payer: COMMERCIAL

## 2024-03-07 ENCOUNTER — OFFICE VISIT (OUTPATIENT)
Dept: CARDIOLOGY | Facility: CLINIC | Age: 74
End: 2024-03-07
Attending: PHYSICIAN ASSISTANT
Payer: COMMERCIAL

## 2024-03-07 VITALS
DIASTOLIC BLOOD PRESSURE: 75 MMHG | WEIGHT: 292 LBS | SYSTOLIC BLOOD PRESSURE: 117 MMHG | HEIGHT: 68 IN | OXYGEN SATURATION: 97 % | BODY MASS INDEX: 44.25 KG/M2 | HEART RATE: 98 BPM

## 2024-03-07 DIAGNOSIS — R91.8 MULTIPLE LUNG NODULES: ICD-10-CM

## 2024-03-07 DIAGNOSIS — I48.19 PERSISTENT ATRIAL FIBRILLATION (H): ICD-10-CM

## 2024-03-07 DIAGNOSIS — I35.0 NONRHEUMATIC AORTIC VALVE STENOSIS: Primary | ICD-10-CM

## 2024-03-07 DIAGNOSIS — I10 BENIGN ESSENTIAL HYPERTENSION: ICD-10-CM

## 2024-03-07 DIAGNOSIS — J96.01 ACUTE RESPIRATORY FAILURE WITH HYPOXIA (H): ICD-10-CM

## 2024-03-07 DIAGNOSIS — I10 ESSENTIAL HYPERTENSION: ICD-10-CM

## 2024-03-07 LAB
ANION GAP SERPL CALCULATED.3IONS-SCNC: 12 MMOL/L (ref 7–15)
BUN SERPL-MCNC: 19.6 MG/DL (ref 8–23)
CALCIUM SERPL-MCNC: 8.8 MG/DL (ref 8.8–10.2)
CHLORIDE SERPL-SCNC: 98 MMOL/L (ref 98–107)
CREAT SERPL-MCNC: 1.13 MG/DL (ref 0.67–1.17)
DEPRECATED HCO3 PLAS-SCNC: 29 MMOL/L (ref 22–29)
EGFRCR SERPLBLD CKD-EPI 2021: 69 ML/MIN/1.73M2
GLUCOSE SERPL-MCNC: 89 MG/DL (ref 70–99)
POTASSIUM SERPL-SCNC: 3.6 MMOL/L (ref 3.4–5.3)
SODIUM SERPL-SCNC: 139 MMOL/L (ref 135–145)

## 2024-03-07 PROCEDURE — 94726 PLETHYSMOGRAPHY LUNG VOLUMES: CPT

## 2024-03-07 PROCEDURE — 80048 BASIC METABOLIC PNL TOTAL CA: CPT | Performed by: PHYSICIAN ASSISTANT

## 2024-03-07 PROCEDURE — 94010 BREATHING CAPACITY TEST: CPT

## 2024-03-07 PROCEDURE — 99214 OFFICE O/P EST MOD 30 MIN: CPT | Performed by: PHYSICIAN ASSISTANT

## 2024-03-07 PROCEDURE — G2211 COMPLEX E/M VISIT ADD ON: HCPCS | Performed by: PHYSICIAN ASSISTANT

## 2024-03-07 PROCEDURE — 36415 COLL VENOUS BLD VENIPUNCTURE: CPT | Performed by: PHYSICIAN ASSISTANT

## 2024-03-07 PROCEDURE — 94729 DIFFUSING CAPACITY: CPT

## 2024-03-07 NOTE — PROGRESS NOTES
Patient completed pulmonary function testing with spirometry, lung volumes and diffusion.  The results of this test met the ATS standards for acceptability and repeatability. Very good patient effort and cooperation. Hgb of 15.2 from 2/13/24 was used for DLCO. Albuterol neb was not given, patient is currently in atrial fibrillation.

## 2024-03-07 NOTE — PATIENT INSTRUCTIONS
Brayden - it was nice to see you today!    Today we reviewed:    You're feeling better on lower dose of losartan - BPs have come up  Almost done with prednisone! Lung tests today, with plans for CT and visit Monday  Feeling better fluid-wise    MEDICATION CHANGES:    None until I see the blood work  May continue torsemide at same dose or may have to restart spironolactone    RECOMMENDATIONS:    Updated echo before you see Dr. Day    FOLLOW UP:    See me 2 weeks 3/21 @ 2 PM  - we'll get labs following    IMPORTANT PHONE NUMBERS FOR  [facility] ([place]):  Arrhythmia nurses Gwen, Mervat, & Caitlin: [phone]

## 2024-03-07 NOTE — LETTER
"3/7/2024    Ranjan Courtney MD  [address] [facility] [address]    RE: Mike Schultz       Dear Colleague,     I had the pleasure of seeing Mike Schultz in the [facility].  [facility]    I had the pleasure of seeing Brayden when he came for follow up of AFib and HF.  This 73 year old sees Dr. Day for his history of:    1. H/o persistent AFib. EF 50-55% 1/2022 - first dx'd in 2012. Failed flecainide (and noted scar on MRI). Underwent PVI and SVC isolation 11/2017. Repeat PVI and CTI for inducible AFlutter 4/2019. Started on Amio with DCCV 11/19/2020 for recurrent AFib in setting of increased alcohol use.   Amiodarone decreased 1/2021; back in persistent AFib 12/2021 and amiodarone stopped as felt asx'c.  This was restarted 8/2022 prior to DCCV 8/22/2022. Amiodarone stopped again 1/2023. AFib restarted 7/2023 after EtOH use, now s/p DCCV (unsuccessful 9/13), then loaded with amiodarone s/p successful DCCV 10/6/2023.  Dr. Day recommended repeat AFib ablation if he started having AFib without it being triggered by EtOH. Amiodarone stopped 11/2023 d/t discovery of pneumonitis  2. Moderate Aortic Stenosis \  3. Benign Essential HTN   4. BOWEN   5. Myocardiac Scar noted on cardiac MRI 10/2017, consistent with prior MI in the left Cx territory. EF 51%.  Avoiding flecainide use  6. H/o Alcohol dependence - no EtOH since 2017, then again 3-4 months in early 2022 after he was noted to be back in AFib.Back in AFib with EtOH use 7/2023  7. Chronic AC  On Xarelto for CHADSVASc 4 (HFpEF, HTN, age, aortic plaquing)  8. Bell's Palsy  9. Lung nodules- noted on CTA to follow-up on aorta 8/2023.   10. HFpEF - hospitalized 2/2024 for recurrent HFpEF in setting        Last Visit & Interval History:  I saw Brayden last week at which time he was feeling \"a little better\" with weight down 8# and legs feeling \"less heavy.\" O2 sats were better ~>89%. He had remained on torsemide 80 mg daily. He " "remained on Abx for shin wound. He noted some mild lightheadedness and weakness/nausea and BP was low. Follow-up blood work showed slight bump in Cr. I continued torsemide, asked him to limit salt in diet and reduced losartan.    Today's Visit:  Brayden is feeling a bit better than when he saw me last week.  BP is higher, and he has no lightheadedness, nausea or other concerns.  He is down another 4# at home.  Remains without orthopnea or PND.  Still with belly fullness and DELEON.    He thinks his shortness of breath is improved, but still notes this mostly on stairs.  O2 levels 89-90% with activity, 93-97%.    Weaning off prednisone - finally on every other day, which he is really excited about.  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    Denies fever/chills - R shin wound is healing, and is more shallow now.        VITALS:  Vitals: /75   Pulse 98   Ht 1.715 m (5' 7.5\")   Wt 132.5 kg (292 lb)   SpO2 97%   BMI 45.06 kg/m      Diagnostic Testing:  Echocardiogram 2/11/2024 LVEF 55-60%.  Normal RV.  Moderate CHARBEL.  Mild MAC.  1+ MR.  Moderate aortic stenosis.  Ascending aortic dilatation (4.0 cm)  Limited Echo 11/2023 LVEF 55-60%. No RWMA. Nl RV. Mild MAC with 1+MR. Mean gradient AoV was 32 mmHg and MARILYN 1.22 cm2. DVI 0.29. Root 3.2 cm. Asc aorta 4.0 cm  ZioPatch 11/24-25/2023 (stopped early d/t hospitalization) NSR 71 bpm. Occasional ectopy. No AFIb seen.  Echo 9/6/2023 showed LVEF 49%. Nl RV. Nl LA size. 1+MR. Mod AoS with mean gradient 16mmHg, Vmax 2.7 m/s, DI 0.31  Aorta 8/2023 with aneuryamsl dilation of suprarenal aorta (3.6x3.8). follow-up CTA showed no aneurysm  EKG 7/2021 showed sinus bradycardia 45 bpm.  Nonspecific ST-T wave changes  Cardiac MRI 10/2017 showed an EF of 51% with mild hypokinesis involving the basal        Plan:  Await BMP before adjusting torsemide and/or adding spironolactone back  Updated echo to assess aortic valve " 5/2024      Assessment/Plan:    H/o Persistent AFib  Had increasing DELEON/SOB from recurrent AFib and underwent DCCV while on amiodarone 8/2022, which was decreased 10/2022, then stopped 1/2023  Had recurrent AFb 7/2023 in setting of EtOH use. Initial DCCV 9/2023 was not successful in restoring SR and amiodarone restarted 9/2023. DCCV 10/2023 restored SR  Unfortunately, hospitalized with pulmonary fibrosis 11/2023 and amiodarone was discontinued. Remained in NSR for a period of time, now back in AF  Focus has been on rate control given inability to use AAD therapy (pulmonary fibrosis with amiodarone, scar on cardiac MRI)  Has remained on anticoagulation with Xarelto for [id number] 4 (HFpEF, HTN, age, aortic plaquing)    PLAN:  Reviewed with Brayden that atrial fibrillation is certainly not helping his shortness of breath/HFpEF, but at this time, our options for treatment are limited given pulmonary toxicity with amiodarone, and scarring on MRI  Current plan is to continue rate control with metoprolol  Continue anticoagulation  See Dr. Day for long-term mgmt of AFib ~5/2024    Recurrent HFpEF  Had this in the past with recurrent atrial fibrillation, which is why we have tried to maintain SR  Unfortunately, back in AF and not able to use amiodarone due to his pneumonitis and have had to adopt rate control strategy.  Aortic stenosis also thought to be contributing, but at this time, appears moderate  Switching Lasix to torsemide 80 mg daily on 2/23 seemed to improve weight/breathing/edema  Weight continues to improve, down another 4 pounds at home    PLAN:  Await BMP  See me back 3/21.  Will get BMP after show he does not wait for results    Pulmonary Fibrosis  Amiodarone has been used off/on given c/o increasing DELEON/HFpEF when in persistent AFib since 10/2021.  Most recently restarted after unsuccessful DCCV 9/2023   Now STOPPED 11/2023 and added to allergy list d/t pneumonitis  Has routine follow-up with  Pulm    PLAN:  Continue routine follow-up with Pulmonology - plan for follow-up visit and testing ~3/2024    HTN  Currently on torsemide 80 mg daily, metoprolol tartrate 25 mg BID, amlodipine 5 mg daily. Losartan reduced from 100 mg to 50 mg daily during visit 3/1 d/t hypotension, lightheadedness, nausea  He feels better on the lower dose of losartan continue current meds    PLAN:  Continue current medications    4. Aortic Stenosis  Mild/moderate on echo 10/2022, stable on echo 9/2023, up to at least moderate on echo while hospitalized 2/2024    PLAN:  Appointment with [facility] has been recommended and is in the process of being arranged.  Note that echo showed only moderate aortic stenosis, and may be too early to intervene,but will get their opinion  Repeat echo 5/2024.       The longitudinal plan of care for the diagnosis(es)/condition(s) as documented were addressed during this visit. Due to the added complexity in care, I will continue to support Brayden in the subsequent management and with ongoing continuity of care.    Crystal Campos PA-C, MSPAS      Orders Placed This Encounter   Procedures    Basic metabolic panel    Follow-Up with Cardiology OTTO    Echocardiogram Complete     No orders of the defined types were placed in this encounter.    There are no discontinued medications.          Encounter Diagnoses   Name Primary?    Persistent atrial fibrillation (H)     Essential hypertension     Nonrheumatic aortic valve stenosis Yes           CURRENT MEDICATIONS:  Current Outpatient Medications   Medication Sig Dispense Refill    allopurinol (ZYLOPRIM) 300 MG tablet Take 1 tablet (300 mg) by mouth daily 90 tablet 4    cetirizine (ZYRTEC) 10 MG tablet Take 10 mg by mouth as needed for allergies      finasteride (PROPECIA) 1 MG tablet Take 1 tablet (1 mg) by mouth daily 90 tablet 4    losartan (COZAAR) 100 MG tablet Take 0.5 tablets (50 mg) by mouth daily      metoprolol tartrate (LOPRESSOR) 25 MG  "tablet Take 1 tablet (25 mg) by mouth 2 times daily for 30 days 60 tablet 0    potassium chloride pushpa ER (KLOR-CON M20) 20 MEQ CR tablet Take 1 tablet (20 mEq) by mouth 2 times daily      predniSONE (DELTASONE) 10 MG tablet Take 6 tablets (60 mg) by mouth daily for 10 days, THEN 5 tablets (50 mg) daily for 14 days, THEN 4 tablets (40 mg) daily for 14 days, THEN 3 tablets (30 mg) daily for 14 days, THEN 3 tablets (30 mg) daily for 14 days, THEN 2 tablets (20 mg) daily for 14 days, THEN 1 tablet (10 mg) daily for 14 days, THEN 1 tablet (10 mg) every 48 hours for 14 days. 319 tablet 0    rivaroxaban ANTICOAGULANT (XARELTO ANTICOAGULANT) 20 MG TABS tablet Take 1 tablet (20 mg) by mouth daily (with dinner) 90 tablet 3    torsemide (DEMADEX) 20 MG tablet Take 4 tablets (80 mg) by mouth daily 120 tablet 1    cefadroxil (DURICEF) 500 MG capsule Take 1 capsule (500 mg) by mouth 2 times daily (Patient not taking: Reported on 3/7/2024) 20 capsule 0    torsemide 40 MG TABS Take 80 mg by mouth daily (Patient not taking: Reported on 3/7/2024) 60 tablet 3       ALLERGIES     Allergies   Allergen Reactions    Amiodarone Shortness Of Breath     Suspected amiodarone toxicity involving lungs         Review of Systems:  Skin:  Negative     Eyes:  Positive for visual blurring  ENT:  Negative    Respiratory:  Positive for sleep apnea;dyspnea on exertion  Cardiovascular:  Negative for;palpitations;chest pain edema;Positive for;exercise intolerance;fatigue  Gastroenterology: Negative for melena;hematochezia  Genitourinary:  Positive for nocturia  Musculoskeletal:  Negative for muscular weakness  Neurologic:  Positive for    Psychiatric:  Negative    Heme/Lymph/Imm:  Negative    Endocrine:  Negative      Physical Exam:  Vitals: /75   Pulse 98   Ht 1.715 m (5' 7.5\")   Wt 132.5 kg (292 lb)   SpO2 97%   BMI 45.06 kg/m      Constitutional:  cooperative, alert and oriented, well developed, well nourished, in no acute distress    "     Skin:  warm and dry to the touch, no apparent skin lesions or masses noted        Head:  normocephalic, no masses or lesions        Eyes:  pupils equal and round;conjunctivae and lids unremarkable;sclera white        ENT:      Slight R mouth droopiness - [place] Palsy    Neck:    JVP elevated;hepatojugular reflux      Chest:  normal breath sounds, clear to auscultation, normal A-P diameter, normal symmetry, normal respiratory excursion, no use of accessory muscles        Cardiac:   irregularly irregular rhythm       systolic ejection murmur;grade 2        Abdomen:  abdomen soft obese      Vascular: pulses full and equal                                      Extremities and Back:  no deformities, clubbing, cyanosis, erythema observed   R LE cellulitis/wound    Neurological:  no gross motor deficits            PAST MEDICAL HISTORY:  Past Medical History:   Diagnosis Date    (HFpEF) heart failure with preserved ejection fraction (H)     Acute gouty arthritis     Alcohol use disorder in remission     Amiodarone pulmonary toxicity     Aortic stenosis     Basal cell carcinoma     Of the nose    Bell's palsy     Herpes simplex without mention of complication     Hyperlipidemia     Hypertension 2000    Persistent atrial fibrillation (H)     persistent, DCCV 12/2017, ablation 11/6/17    Pulmonary nodules     Sleep apnea     CPAP       PAST SURGICAL HISTORY:  Past Surgical History:   Procedure Laterality Date    ABLATION OF DYSRHYTHMIC FOCUS  2018, 04/12/2019    Procedure: EP Ablation Focal AFIB;  Surgeon: Fady Day MD;  Location:  HEART CARDIAC CATH LAB    ACHILLES TENDON SURGERY  1997    ANESTHESIA CARDIOVERSION N/A 2/26/2019    Procedure: ANESTHESIA CARDIOVERSION (CONNER);  Surgeon: GENERIC ANESTHESIA PROVIDER;  Location:  OR    ANESTHESIA CARDIOVERSION N/A 11/19/2020    Procedure: ANESTHESIA, FOR CARDIOVERSION (dr campbell);  Surgeon: GENERIC ANESTHESIA PROVIDER;  Location:  OR    ANESTHESIA CARDIOVERSION N/A  2022    Procedure: CARDIOVERSION;  Surgeon: GENERIC ANESTHESIA PROVIDER;  Location:  OR    ANESTHESIA CARDIOVERSION N/A 2023    Procedure: Anesthesia cardioversion;  Surgeon: GENERIC ANESTHESIA PROVIDER;  Location:  OR    ANESTHESIA CARDIOVERSION N/A 10/6/2023    Procedure: Anesthesia cardioversion;  Surgeon: GENERIC ANESTHESIA PROVIDER;  Location:  OR    BASAL CELL CARCINOMA EXCISION Right 2009    nose, took cartilage from inner ear.    CARDIOVERSION  2019    cardioversion for atr fib    COLONOSCOPY      COLONOSCOPY N/A 3/22/2023    Procedure: COLONOSCOPY, FLEXIBLE, WITH LESION REMOVAL USING SNARE;  Surgeon: Anne Lucero MD;  Location:  GI    EP ABLATION FOCAL AFIB N/A 2019    Procedure: EP Ablation Focal AFIB;  Surgeon: Fady Day MD;  Location:  HEART CARDIAC CATH LAB    HERNIA REPAIR, UMBILICAL  2012    Procedure: HERNIORRHAPHY UMBILICAL;  UMBILICAL HERNIA REPAIR;  Surgeon: Ra Crocker MD;  Location: [facility]    HERNIORRHAPHY UMBILICAL  2012    Procedure: HERNIORRHAPHY UMBILICAL;  UMBILICAL HERNIA REPAIR;  Surgeon: Ra Crocker MD;  Location: [facility]    ORTHOPEDIC SURGERY      ORTHOPEDIC SURGERY      OTHER SURGICAL HISTORY Right 2017    total KNEE ARTHROSCOPY     TOTAL KNEE ARTHROPLASTY Left 2018    ZZC NONSPECIFIC PROCEDURE  1997    achilles tendon    ZZC NONSPECIFIC PROCEDURE      knees, arthroscopy    ZZC NONSPECIFIC PROCEDURE      basal cell skin ca, nose    ZZC NONSPECIFIC PROCEDURE      flex sig; colonoscopy due 3/2008    ZZC NONSPECIFIC PROCEDURE      cardioversion for atr fib    ZZC TOTAL KNEE ARTHROPLASTY Left 2018    Dr. Malick Suarez       FAMILY HISTORY:  Family History   Problem Relation Age of Onset    Family History Negative Mother     Heart Disease Father         decesased at 42 - MI    Heart Disease Sister          MI    Lipids Sister     Lipids Sister     No Known Problems Sister     Heart Disease  Brother          MI    Heart Disease Brother         CABG AT 49    Lung Cancer No family hx of        SOCIAL HISTORY:  Social History     Socioeconomic History    Marital status: Single   Tobacco Use    Smoking status: Former     Packs/day: 1.00     Years: 10.00     Additional pack years: 0.00     Total pack years: 10.00     Types: Cigarettes     Start date:      Quit date: 2011     Years since quittin.5    Smokeless tobacco: Never    Tobacco comments:     quit    Vaping Use    Vaping Use: Never used   Substance and Sexual Activity    Alcohol use: Not Currently    Drug use: Never    Sexual activity: Not Currently     Partners: Female     Birth control/protection: None   Social History Narrative    Worked in retail () and insurance sales     Spends some time on [place] on [place]     Thank you for allowing me to participate in the care of your patient.      Sincerely,     Natalie Campos PA-C     [facility]  cc:   Natalie Campos PA-C  [address]  [place] [id number]

## 2024-03-07 NOTE — LETTER
1/9/2019    Johnny Kay MD  [address] [facility] [address]    RE: Mike Schultz       Dear Colleague,    I had the pleasure of seeing Mike Schultz in the [facility].        ThanHPI and Plan:   See dictation    Orders Placed This Encounter   Procedures     Follow-Up with Cardiac Advanced Practice Provider Post-Procedure     Follow-Up with Electrophysiologist     EKG 12-lead complete w/read - Clinics (performed today)     Zio Patch Holter Adult Pediatric Greater than 48 hrs       Orders Placed This Encounter   Medications     diltiazem ER COATED BEADS (CARDIZEM CD) 240 MG 24 hr capsule     Sig: Take 1 capsule (240 mg) by mouth daily     Dispense:  90 capsule     Refill:  3       Medications Discontinued During This Encounter   Medication Reason     amLODIPine (NORVASC) 5 MG tablet          Encounter Diagnoses   Name Primary?     Benign essential hypertension      Persistent atrial fibrillation (H) Yes       CURRENT MEDICATIONS:  Current Outpatient Medications   Medication Sig Dispense Refill     allopurinol (ZYLOPRIM) 300 MG tablet Take 1 tablet (300 mg) by mouth daily 90 tablet 3     diltiazem ER COATED BEADS (CARDIZEM CD) 240 MG 24 hr capsule Take 1 capsule (240 mg) by mouth daily 90 capsule 3     finasteride (PROPECIA) 1 MG tablet Take 1 mg by mouth daily.       furosemide (LASIX) 20 MG tablet Take 2 tablets (40 mg) by mouth daily 180 tablet 3     losartan (COZAAR) 100 MG tablet Take 1 tablet (100 mg) by mouth daily 90 tablet 3     metoprolol tartrate (LOPRESSOR) 50 MG tablet Take 1 tablet (50 mg) by mouth daily       potassium chloride (K-TAB,KLOR-CON) 10 MEQ tablet Take 2 tablets (20 mEq) by mouth daily       XARELTO 20 MG TABS tablet take 1 tablet (20mg) by mouth daily with dinner) avoid use of alcohol while using medication. 30 tablet 11     acetaminophen (TYLENOL) 325 MG tablet Take 650 mg by mouth       Cyanocobalamin (VITAMIN B 12 PO)        INDOMETHACIN PO  Take 50 mg by mouth daily as needed for moderate pain       KLOR-CON 10 MEQ CR tablet TAKE TWO TABLETS BY MOUTH DAILY  180 tablet 1     order for DME Equipment ordered: RESMED CPAP Mask type: Full face  Settings: 15         ALLERGIES     Allergies   Allergen Reactions     No Known Drug Allergies        PAST MEDICAL HISTORY:  Past Medical History:   Diagnosis Date     Atrial fibrillation (H)     persistent, DCCV 2017, ablation 17     Essential hypertension, benign      H/O ETOH abuse      Herpes simplex without mention of complication      Hyperlipidemia      Obesity      S/P ablation of atrial fibrillation 2017     Tobacco use disorder     dced 2004       PAST SURGICAL HISTORY:  Past Surgical History:   Procedure Laterality Date     C NONSPECIFIC PROCEDURE      achilles tendon     C NONSPECIFIC PROCEDURE      knees, arthroscopy     C NONSPECIFIC PROCEDURE      basal cell skin ca, nose     C NONSPECIFIC PROCEDURE      flex sig; colonoscopy due 3/2008     C NONSPECIFIC PROCEDURE      cardioversion for atr fib     C TOTAL KNEE ARTHROPLASTY Left 2018    Dr. Malick Suarez     HERNIORRHAPHY UMBILICAL  2012    Procedure: HERNIORRHAPHY UMBILICAL;  UMBILICAL HERNIA REPAIR;  Surgeon: Ra Crocker MD;  Location: [facility]     ORTHOPEDIC SURGERY         FAMILY HISTORY:  Family History   Problem Relation Age of Onset     Family History Negative Mother      Heart Disease Father         decesased at 42 - MI     Heart Disease Sister          MI     Heart Disease Brother          MI     Heart Disease Brother         CABG AT 49     Lipids Sister      Lipids Sister      Lipids Sister      Family History Negative Brother        SOCIAL HISTORY:  Social History     Socioeconomic History     Marital status: Single     Spouse name: None     Number of children: None     Years of education: None     Highest education level: None   Social Needs     Financial resource strain: None     Food  Upper Endoscopy and Colonoscopy/Event Note "insecurity - worry: None     Food insecurity - inability: None     Transportation needs - medical: None     Transportation needs - non-medical: None   Occupational History     None   Tobacco Use     Smoking status: Former Smoker     Packs/day: 1.00     Years: 10.00     Pack years: 10.00     Types: Cigarettes     Last attempt to quit: 2011     Years since quittin.3     Smokeless tobacco: Never Used   Substance and Sexual Activity     Alcohol use: No     Drug use: No     Sexual activity: Yes     Partners: Female   Other Topics Concern     Parent/sibling w/ CABG, MI or angioplasty before 65F 55M? Not Asked   Social History Narrative     None       Review of Systems:  Skin:  Negative       Eyes:  Positive for glasses(reading only)    ENT:  Negative      Respiratory:  Positive for dyspnea on exertion;shortness of breath     Cardiovascular:  Negative Positive for;edema    Gastroenterology: Negative      Genitourinary:  Negative      Musculoskeletal:  Negative      Neurologic:  Negative      Psychiatric:  Negative      Heme/Lymph/Imm:  Negative      Endocrine:  Negative        Physical Exam:  Vitals: BP (!) 168/99   Pulse 83   Ht 1.702 m (5' 7.01\")   Wt 138.3 kg (305 lb)   BMI 47.76 kg/m       Constitutional:  cooperative;well nourished;in no acute distress obese increased BMI    Skin:  warm and dry to the touch, no apparent skin lesions or masses noted          Head:  normocephalic        Eyes:  pupils equal and round, conjunctivae and lids unremarkable, sclera white, no xanthalasma, EOMS intact, no nystagmus        Lymph:No Cervical lymphadenopathy present     ENT:  no pallor or cyanosis, dentition good        Neck:  carotid pulses are full and equal bilaterally, JVP normal, no carotid bruit        Respiratory:  normal breath sounds, clear to auscultation, normal A-P diameter, normal symmetry, normal respiratory excursion, no use of accessory muscles         Cardiac: regular rhythm, normal S1/S2, no S3 or " S4, apical impulse not displaced, no murmurs, gallops or rubs irregularly irregular rhythm   no presence of murmur          pulses full and equal, no bruits auscultated                                        GI:  abdomen soft, non-tender, BS normoactive, no mass, no HSM, no bruits obese      Extremities and Muscular Skeletal:  no deformities, clubbing, cyanosis, erythema observed   bilateral LE edema;trace          Neurological:  affect appropriate   mild limp    Psych:  Alert and Oriented x 3;affect appropriate, oriented to time, person and place        CC  Mary Yates MD  [address]  [place] [address]                HPI and Plan:   See dictation    Orders Placed This Encounter   Procedures     Follow-Up with Cardiac Advanced Practice Provider Post-Procedure     Follow-Up with Electrophysiologist     EKG 12-lead complete w/read - Clinics (performed today)     Zio Patch Holter Adult Pediatric Greater than 48 hrs       Orders Placed This Encounter   Medications     diltiazem ER COATED BEADS (CARDIZEM CD) 240 MG 24 hr capsule     Sig: Take 1 capsule (240 mg) by mouth daily     Dispense:  90 capsule     Refill:  3       Medications Discontinued During This Encounter   Medication Reason     amLODIPine (NORVASC) 5 MG tablet          Encounter Diagnoses   Name Primary?     Benign essential hypertension      Persistent atrial fibrillation (H) Yes       CURRENT MEDICATIONS:  Current Outpatient Medications   Medication Sig Dispense Refill     allopurinol (ZYLOPRIM) 300 MG tablet Take 1 tablet (300 mg) by mouth daily 90 tablet 3     diltiazem ER COATED BEADS (CARDIZEM CD) 240 MG 24 hr capsule Take 1 capsule (240 mg) by mouth daily 90 capsule 3     finasteride (PROPECIA) 1 MG tablet Take 1 mg by mouth daily.       furosemide (LASIX) 20 MG tablet Take 2 tablets (40 mg) by mouth daily 180 tablet 3     losartan (COZAAR) 100 MG tablet Take 1 tablet (100 mg) by mouth daily 90 tablet 3     metoprolol tartrate (LOPRESSOR)  50 MG tablet Take 1 tablet (50 mg) by mouth daily       potassium chloride (K-TAB,KLOR-CON) 10 MEQ tablet Take 2 tablets (20 mEq) by mouth daily       XARELTO 20 MG TABS tablet take 1 tablet (20mg) by mouth daily with dinner) avoid use of alcohol while using medication. 30 tablet 11     acetaminophen (TYLENOL) 325 MG tablet Take 650 mg by mouth       Cyanocobalamin (VITAMIN B 12 PO)        INDOMETHACIN PO Take 50 mg by mouth daily as needed for moderate pain       KLOR-CON 10 MEQ CR tablet TAKE TWO TABLETS BY MOUTH DAILY  180 tablet 1     order for DME Equipment ordered: RESMED CPAP Mask type: Full face  Settings: 15         ALLERGIES     Allergies   Allergen Reactions     No Known Drug Allergies        PAST MEDICAL HISTORY:  Past Medical History:   Diagnosis Date     Atrial fibrillation (H)     persistent, DCCV 2017, ablation 17     Essential hypertension, benign      H/O ETOH abuse      Herpes simplex without mention of complication      Hyperlipidemia      Obesity      S/P ablation of atrial fibrillation 2017     Tobacco use disorder     dced 2004       PAST SURGICAL HISTORY:  Past Surgical History:   Procedure Laterality Date     C NONSPECIFIC PROCEDURE      achilles tendon     C NONSPECIFIC PROCEDURE      knees, arthroscopy     C NONSPECIFIC PROCEDURE      basal cell skin ca, nose     C NONSPECIFIC PROCEDURE      flex sig; colonoscopy due 3/2008     C NONSPECIFIC PROCEDURE      cardioversion for atr fib     C TOTAL KNEE ARTHROPLASTY Left 2018    Dr. Malick Suarez     HERNIORRHAPHY UMBILICAL  2012    Procedure: HERNIORRHAPHY UMBILICAL;  UMBILICAL HERNIA REPAIR;  Surgeon: Ra Crocker MD;  Location: [facility]     ORTHOPEDIC SURGERY         FAMILY HISTORY:  Family History   Problem Relation Age of Onset     Family History Negative Mother      Heart Disease Father         decesased at 42 - MI     Heart Disease Sister          MI     Heart Disease Brother           "MI     Heart Disease Brother         CABG AT 49     Lipids Sister      Lipids Sister      Lipids Sister      Family History Negative Brother        SOCIAL HISTORY:  Social History     Socioeconomic History     Marital status: Single     Spouse name: None     Number of children: None     Years of education: None     Highest education level: None   Social Needs     Financial resource strain: None     Food insecurity - worry: None     Food insecurity - inability: None     Transportation needs - medical: None     Transportation needs - non-medical: None   Occupational History     None   Tobacco Use     Smoking status: Former Smoker     Packs/day: 1.00     Years: 10.00     Pack years: 10.00     Types: Cigarettes     Last attempt to quit: 2011     Years since quittin.3     Smokeless tobacco: Never Used   Substance and Sexual Activity     Alcohol use: No     Drug use: No     Sexual activity: Yes     Partners: Female   Other Topics Concern     Parent/sibling w/ CABG, MI or angioplasty before 65F 55M? Not Asked   Social History Narrative     None       Review of Systems:  Skin:  Negative       Eyes:  Positive for glasses(reading only)    ENT:  Negative      Respiratory:  Positive for dyspnea on exertion;shortness of breath     Cardiovascular:  Negative Positive for;edema    Gastroenterology: Negative      Genitourinary:  Negative      Musculoskeletal:  Negative      Neurologic:  Negative      Psychiatric:  Negative      Heme/Lymph/Imm:  Negative      Endocrine:  Negative        Physical Exam:  Vitals: BP (!) 168/99   Pulse 83   Ht 1.702 m (5' 7.01\")   Wt 138.3 kg (305 lb)   BMI 47.76 kg/m       Constitutional:  cooperative;well nourished;in no acute distress obese increased BMI    Skin:  warm and dry to the touch, no apparent skin lesions or masses noted          Head:  normocephalic        Eyes:  pupils equal and round, conjunctivae and lids unremarkable, sclera white, no xanthalasma, EOMS intact, no nystagmus  "       Lymph:No Cervical lymphadenopathy present     ENT:  no pallor or cyanosis, dentition good        Neck:  carotid pulses are full and equal bilaterally, JVP normal, no carotid bruit        Respiratory:  normal breath sounds, clear to auscultation, normal A-P diameter, normal symmetry, normal respiratory excursion, no use of accessory muscles         Cardiac: regular rhythm, normal S1/S2, no S3 or S4, apical impulse not displaced, no murmurs, gallops or rubs irregularly irregular rhythm   no presence of murmur          pulses full and equal, no bruits auscultated                                        GI:  abdomen soft, non-tender, BS normoactive, no mass, no HSM, no bruits obese      Extremities and Muscular Skeletal:  no deformities, clubbing, cyanosis, erythema observed   bilateral LE edema;trace          Neurological:  affect appropriate   mild limp    Psych:  Alert and Oriented x 3;affect appropriate, oriented to time, person and place        CC  Mary Yates MD  [address]  [address]  [place] [address]            k you for allowing me to participate in the care of your patient.      Sincerely,     Mary Yates MD     [facility]    cc:   Mary Yates MD  [address]  [address]  [place] [address]

## 2024-03-08 ENCOUNTER — DOCUMENTATION ONLY (OUTPATIENT)
Dept: CARDIOLOGY | Facility: CLINIC | Age: 74
End: 2024-03-08
Payer: COMMERCIAL

## 2024-03-08 ENCOUNTER — TELEPHONE (OUTPATIENT)
Dept: CARDIOLOGY | Facility: CLINIC | Age: 74
End: 2024-03-08
Payer: COMMERCIAL

## 2024-03-08 DIAGNOSIS — I50.9 ACUTE ON CHRONIC CONGESTIVE HEART FAILURE, UNSPECIFIED HEART FAILURE TYPE (H): ICD-10-CM

## 2024-03-08 DIAGNOSIS — E87.6 HYPOKALEMIA: ICD-10-CM

## 2024-03-08 DIAGNOSIS — I10 BENIGN ESSENTIAL HYPERTENSION: ICD-10-CM

## 2024-03-08 LAB
DLCOCOR-%PRED-PRE: 67 %
DLCOCOR-PRE: 15.94 ML/MIN/MMHG
DLCOUNC-%PRED-PRE: 68 %
DLCOUNC-PRE: 16.2 ML/MIN/MMHG
DLCOUNC-PRED: 23.69 ML/MIN/MMHG
ERV-%PRED-PRE: 44 %
ERV-PRE: 0.57 L
ERV-PRED: 1.29 L
EXPTIME-PRE: 6.45 SEC
FEF2575-%PRED-PRE: 96 %
FEF2575-PRE: 2.02 L/SEC
FEF2575-PRED: 2.08 L/SEC
FEFMAX-%PRED-PRE: 87 %
FEFMAX-PRE: 6.64 L/SEC
FEFMAX-PRED: 7.55 L/SEC
FEV1-%PRED-PRE: 90 %
FEV1-PRE: 2.42 L
FEV1FEV6-PRE: 78 %
FEV1FEV6-PRED: 77 %
FEV1FVC-PRE: 77 %
FEV1FVC-PRED: 77 %
FEV1SVC-PRE: 73 %
FEV1SVC-PRED: 70 %
FIFMAX-PRE: 3.85 L/SEC
FRCPLETH-%PRED-PRE: 83 %
FRCPLETH-PRE: 2.98 L
FRCPLETH-PRED: 3.57 L
FVC-%PRED-PRE: 89 %
FVC-PRE: 3.15 L
FVC-PRED: 3.51 L
GAW-%PRED-PRE: 41 %
GAW-PRE: 0.43 L/S/CMH2O
GAW-PRED: 1.03 L/S/CMH2O
IC-%PRED-PRE: 106 %
IC-PRE: 2.75 L
IC-PRED: 2.59 L
RVPLETH-%PRED-PRE: 92 %
RVPLETH-PRE: 2.41 L
RVPLETH-PRED: 2.61 L
SGAW-%PRED-PRE: 146 %
SGAW-PRE: 0.12 1/CMH2O*S
SGAW-PRED: 0.08 1/CMH2O*S
SRAW-%PRED-PRE: 175 %
SRAW-PRE: 8.34 CMH2O*S
SRAW-PRED: 4.76 CMH2O*S
TLCPLETH-%PRED-PRE: 86 %
TLCPLETH-PRE: 5.73 L
TLCPLETH-PRED: 6.62 L
VA-%PRED-PRE: 86 %
VA-PRE: 5.08 L
VC-%PRED-PRE: 85 %
VC-PRE: 3.32 L
VC-PRED: 3.87 L

## 2024-03-08 RX ORDER — POTASSIUM CHLORIDE 1500 MG/1
20 TABLET, EXTENDED RELEASE ORAL DAILY
Qty: 90 TABLET | Refills: 3 | Status: SHIPPED | OUTPATIENT
Start: 2024-03-08 | End: 2024-03-21

## 2024-03-08 RX ORDER — METOPROLOL TARTRATE 25 MG/1
25 TABLET, FILM COATED ORAL 2 TIMES DAILY
Qty: 180 TABLET | Refills: 3 | Status: ON HOLD | OUTPATIENT
Start: 2024-03-08 | End: 2024-05-16

## 2024-03-08 RX ORDER — SPIRONOLACTONE 25 MG/1
12.5 TABLET ORAL DAILY
Qty: 45 TABLET | Refills: 2 | Status: SHIPPED | OUTPATIENT
Start: 2024-03-08 | End: 2024-03-21

## 2024-03-08 NOTE — TELEPHONE ENCOUNTER
[facility] Call Center    Phone Message    May a detailed message be left on voicemail: yes     Reason for Call: Other: patient is returning call, please call to advise, thank you     Action Taken: Other: cardiology    Travel Screening: Not Applicable  Thank you!  [facility]

## 2024-03-08 NOTE — TELEPHONE ENCOUNTER
Reviewed lab results and recommendations per TOBI Rivas with patient:    Please let him know it looked really good on torsemide 80 mg daily and KCl 20 mEq BID.     Continue torsemide 80 mg daily  Decrease KCl to 20 mEq DAILY  Restart spironolactone 12.5 mg daily - 1/2 of a 25 mg tab.  We had tried this via [employer] 2/6 but ended up stopping it at hospital as they wanted to focus more on loop diuretics.  Please send Rx if needed  I have gotten otoniel'd up  4. Getting BMP when he sees me in 2 weeks   already scheduled    Medication list updated in epic.  Patient requesting refill on metoprolol.  Sent scripts and refill request to preferred pharmacy.  Patient provided verbal understanding regarding above.  PRABHA Palomo

## 2024-03-08 NOTE — PROGRESS NOTES
Brayden's BMP was not back before I left last night for some reason (drawn at 1420)    Please let him know it looked really good on torsemide 80 mg daily and KCl 20 mEq BID.    Continue torsemide 80 mg daily  Decrease KCl to 20 mEq DAILY  Restart spironolactone 12.5 mg daily - 1/2 of a 25 mg tab.  We had tried this via [employer] 2/6 but ended up stopping it at hospital as they wanted to focus more on loop diuretics.  Please send Rx if needed  I have gotten otoniel'd up  4. Getting BMP when he sees me in 2 weeks        Component      Latest Ref Rng 2/11/2024  2:20 AM 2/13/2024  8:33 AM 2/21/2024  8:02 AM 3/1/2024  12:03 PM 3/7/2024  2:20 PM   Sodium      135 - 145 mmol/L 139  139  140  140  139    Potassium      3.4 - 5.3 mmol/L 3.7  3.8  3.5  3.6  3.6    Carbon Dioxide (CO2)      22 - 29 mmol/L 25  32 (H)  32 (H)  30 (H)  29    Anion Gap      7 - 15 mmol/L 15  9  9  12  12    Urea Nitrogen      8.0 - 23.0 mg/dL 28.5 (H)  29.5 (H)  25.4 (H)  27.4 (H)  19.6    Creatinine      0.67 - 1.17 mg/dL 1.25 (H)  1.31 (H)  1.25 (H)  1.55 (H)  1.13    GFR Estimate      >60 mL/min/1.73m2 61  58 (L)  61  47 (L)  69    Calcium      8.8 - 10.2 mg/dL 9.0  8.9  8.7 (L)  8.9  8.8    Chloride      98 - 107 mmol/L 99  98  99  98  98    Glucose      70 - 99 mg/dL 112 (H)  110 (H)  117 (H)  108 (H)  89         Reza Rojas

## 2024-03-08 NOTE — TELEPHONE ENCOUNTER
Message left for patient to review lab results and recommendations per TOBI Rivas.  Waiting call back from patient.  PRABHA Palomo

## 2024-03-11 ENCOUNTER — VIRTUAL VISIT (OUTPATIENT)
Dept: ONCOLOGY | Facility: CLINIC | Age: 74
End: 2024-03-11
Attending: INTERNAL MEDICINE
Payer: COMMERCIAL

## 2024-03-11 ENCOUNTER — HOSPITAL ENCOUNTER (OUTPATIENT)
Dept: CT IMAGING | Facility: CLINIC | Age: 74
Discharge: HOME | End: 2024-03-11
Attending: INTERNAL MEDICINE | Admitting: INTERNAL MEDICINE
Payer: COMMERCIAL

## 2024-03-11 VITALS — WEIGHT: 292 LBS | HEIGHT: 68 IN | BODY MASS INDEX: 44.25 KG/M2

## 2024-03-11 DIAGNOSIS — R91.8 MULTIPLE LUNG NODULES: ICD-10-CM

## 2024-03-11 DIAGNOSIS — J96.01 ACUTE RESPIRATORY FAILURE WITH HYPOXIA (H): ICD-10-CM

## 2024-03-11 DIAGNOSIS — I48.20 CHRONIC ATRIAL FIBRILLATION (H): ICD-10-CM

## 2024-03-11 DIAGNOSIS — G47.33 OSA (OBSTRUCTIVE SLEEP APNEA): Primary | ICD-10-CM

## 2024-03-11 PROCEDURE — 99214 OFFICE O/P EST MOD 30 MIN: CPT | Mod: 95 | Performed by: INTERNAL MEDICINE

## 2024-03-11 PROCEDURE — 71250 CT THORAX DX C-: CPT

## 2024-03-11 ASSESSMENT — PAIN SCALES - GENERAL: PAINLEVEL: NO PAIN (0)

## 2024-03-11 NOTE — LETTER
3/11/2024         RE: [facility] Schultz  [address] [facility] [address]        Dear Colleague,    Thank you for referring your patient, Mike Schultz, to the [facility]. Please see a copy of my visit note below.      Virtual Visit Details    Type of service:  Video Visit     Originating Location (pt. Location): Home    Distant Location (provider location):  Off-site [place] office  Platform used for Video Visit: Jeremy  Joined the call at 3/11/2024, 9:35:35 am.  Left the call at 3/11/2024, 9:59:03 am.    [facility] Follow Up  Visit    Reason for Visit  Mike Schultz is a 73 year old male who is seen in follow up for lung nodules and amiodarone pneumonitis  Pulmonary HPI    - Since our last visit he has continued to have trouble with atrial fibrillation. He remained in sinus rhythm for about 4 months after cardioversion in October. He does not sleep well, he has nocturia. He does use CPAP, the machine itself is about 3 years old, but initial diagnosis was in 2016. His weight has definitely fluctuated    September, he was hospitalized with acute respiratory failure that was attributed to amiodarone toxicity and he was discharged on a prednisone taper that lasts for several months. He is currently on last week of prednisone taper he is on 10 mg every other day, recently finishe Bactrim for prophylaxis.    - aorta screening discovered incidental lung nodules  Back in 2022 he had a CT for chest pain and there were no nodules on that one  - no episodes of pneumonia or bad cold    Other active medical problems include:   - amiodarone-induced pneumonitis with hospitalization   - atrial fibrillation on Xarelto, cardioversion most recently in October 2023  - Bell's Palsy on Right last year with some residual symptoms    - BOWEN on CPAP (oxygen bleed in since hospitalization Fall 2023) diagnosed in 2016    Exposure history:  Denies asbestos or radon exposure   TB risk factors: No  Prior Imaging:Yes  Constitutional Symptoms: No  Personal history of cancer:Yes  Up to date on age-appropriate cancer screening:No    ROS Pulmonary  Dyspnea: Yes, Cough: No, Chest pain: No, Wheezing: No, Sputum Production: No, Hemoptysis: No  A complete ROS was otherwise negative except as noted in the HPI.  The patient was seen and examined by Jeanette Mckeon MD   Current Outpatient Medications   Medication     allopurinol (ZYLOPRIM) 300 MG tablet     cetirizine (ZYRTEC) 10 MG tablet     finasteride (PROPECIA) 1 MG tablet     losartan (COZAAR) 100 MG tablet     metoprolol tartrate (LOPRESSOR) 25 MG tablet     potassium chloride pushpa ER (KLOR-CON M20) 20 MEQ CR tablet     predniSONE (DELTASONE) 10 MG tablet     rivaroxaban ANTICOAGULANT (XARELTO ANTICOAGULANT) 20 MG TABS tablet     spironolactone (ALDACTONE) 25 MG tablet     torsemide (DEMADEX) 20 MG tablet     No current facility-administered medications for this visit.     Allergies   Allergen Reactions     Amiodarone Shortness Of Breath     Suspected amiodarone toxicity involving lungs     Social History     Socioeconomic History     Marital status: Single     Spouse name: Not on file     Number of children: Not on file     Years of education: Not on file     Highest education level: Not on file   Occupational History     Not on file   Tobacco Use     Smoking status: Former     Packs/day: 1.00     Years: 10.00     Additional pack years: 0.00     Total pack years: 10.00     Types: Cigarettes     Start date:      Quit date: 2011     Years since quittin.5     Smokeless tobacco: Never     Tobacco comments:     quit    Vaping Use     Vaping Use: Never used   Substance and Sexual Activity     Alcohol use: Not Currently     Drug use: Never     Sexual activity: Not Currently     Partners: Female     Birth control/protection: None   Other Topics Concern     Parent/sibling w/ CABG, MI or  angioplasty before 65F 55M? 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     Social Determinants of Health     Financial Resource Strain: Not on file   Food Insecurity: Not on file   Transportation Needs: Not on file   Physical Activity: Not on file   Stress: Not on file   Social Connections: Not on file   Interpersonal Safety: Not on file   Housing Stability: Not on file     Past Medical History:   Diagnosis Date     (HFpEF) heart failure with preserved ejection fraction (H)      Acute gouty arthritis      Alcohol use disorder in remission      Amiodarone pulmonary toxicity      Aortic stenosis      Basal cell carcinoma     Of the nose     Bell's palsy      Herpes simplex without mention of complication      Hyperlipidemia      Hypertension 2000     Persistent atrial fibrillation (H)     persistent, DCCV 12/2017, ablation 11/6/17     Pulmonary nodules      Sleep apnea     CPAP     Past Surgical History:   Procedure Laterality Date     ABLATION OF DYSRHYTHMIC FOCUS  2018, 04/12/2019    Procedure: EP Ablation Focal AFIB;  Surgeon: Fady Day MD;  Location:  HEART CARDI[facility] CATH LAB     ACHILLES TENDON SURGERY  1997     ANESTHESIA CARDIOVERSION N/A 2/26/2019    Procedure: ANESTHESIA CARDIOVERSION (CONNER);  Surgeon: GENERIC ANESTHESIA PROVIDER;  Location:  OR     ANESTHESIA CARDIOVERSION N/A 11/19/2020    Procedure: ANESTHESIA, FOR CARDIOVERSION (dr campbell);  Surgeon: GENERIC ANESTHESIA PROVIDER;  Location:  OR     ANESTHESIA CARDIOVERSION N/A 8/22/2022    Procedure: CARDIOVERSION;  Surgeon: GENERIC ANESTHESIA PROVIDER;  Location:  OR     ANESTHESIA CARDIOVERSION N/A 9/13/2023    Procedure: Anesthesia cardioversion;  Surgeon: GENERIC ANESTHESIA PROVIDER;  Location:  OR     ANESTHESIA CARDIOVERSION N/A 10/6/2023    Procedure: Anesthesia cardioversion;  Surgeon: GENERIC ANESTHESIA PROVIDER;  Location: [facility]  CELL CARCINOMA EXCISION Right     nose, took cartilage from inner ear.     CARDIOVERSION  ,     cardioversion for atr fib     COLONOSCOPY       COLONOSCOPY N/A 3/22/2023    Procedure: COLONOSCOPY, FLEXIBLE, WITH LESION REMOVAL USING SNARE;  Surgeon: Anne Lucero MD;  Location:  GI     EP ABLATION FOCAL AFIB N/A 2019    Procedure: EP Ablation Focal AFIB;  Surgeon: Fady Day MD;  Location:  HEART CARDIAC CATH LAB     HERNIA REPAIR, UMBILICAL  2012    Procedure: HERNIORRHAPHY UMBILICAL;  UMBILICAL HERNIA REPAIR;  Surgeon: Ra Crocker MD;  Location: [facility]     HERNIORRHAPHY UMBILICAL  2012    Procedure: HERNIORRHAPHY UMBILICAL;  UMBILICAL HERNIA REPAIR;  Surgeon: Ra Crocker MD;  Location: [facility]     ORTHOPEDIC SURGERY       ORTHOPEDIC SURGERY       OTHER SURGICAL HISTORY Right 2017    total KNEE ARTHROSCOPY      TOTAL KNEE ARTHROPLASTY Left 2018     ZZC NONSPECIFIC PROCEDURE  1997    achilles tendon     ZZC NONSPECIFIC PROCEDURE      knees, arthroscopy     ZZC NONSPECIFIC PROCEDURE      basal cell skin ca, nose     ZZC NONSPECIFIC PROCEDURE      flex sig; colonoscopy due 3/2008     ZZC NONSPECIFIC PROCEDURE      cardioversion for atr fib     ZZC TOTAL KNEE ARTHROPLASTY Left 2018    Dr. Malick Suarez     Family History   Problem Relation Age of Onset     Family History Negative Mother      Heart Disease Father         decesased at 42 - MI     Heart Disease Sister          MI     Lipids Sister      Lipids Sister      No Known Problems Sister      Heart Disease Brother          MI     Heart Disease Brother         CABG AT 49     Lung Cancer No family hx of        Exam:   There were no vitals taken for this visit.  GENERAL APPEARANCE: Well developed, well nourished, alert, and in no apparent distress.  PSYCH: mentation appears normal. and affect normal/bright  Results:  - My interpretation of the images relevant for this  visit includes: CT chest imaging reviewed today's images compared Dec 2022, Aug 2023, Sept 2023 multiple nodules in the  base of both lungs mostly improved / resolved as the intersitial opacities also improve but not completely resolved  - My interpretation of the PFT's relevant for this visit includes: normal spirometry and lung volumes, mildly reduced diffusion, spirometry and lung volumes slightly increased from December 2023 but DLCO has slightly declined       Assessment and plan: Brayden is a 72 yo male with incidental lung nodules, atrial fibrillation and recent probable amiodarone pulmonary toxicity.    Lung nodule - seen on chest CT, not seen in 2022, no significant change between Aug and Sept, unusual in appearance, no respiratory symptoms.     Nodify reduced risk (11%); most are resolved or improved now. Based on final interpretation from CT today and my own review I don't think any additional follow up is needed for these. Perhaps they represented early amiodarone reaction? As they were detected while on amio but prior to the hospitalization for respiratory failure.   Amiodarone toxicity - hospitalized with acute respiratory failure, symptoms and imaging and oximetry improved. PFTs borderline normal.   He was prescribed a prolonged steroid taper from the hospital and will continue through the end of the month. Repeat CT and PFT indicate resolution of amiodarone pneumonitis.   BOWEN - given poor sleep and persistent difficult to manage atrial fibrillation, I recommend he reconsult with sleep medicine to ensure adequate nocturnal ventilation. I suggest he continue supplemental oxygen via CPAP in the meantime.   Follow up with me in 4-6 months to see how things are going - no plan for CT chest at this time.       Again, thank you for allowing me to participate in the care of your patient.        Sincerely,        Jeanette Mckeon MD

## 2024-03-11 NOTE — PATIENT INSTRUCTIONS
Let's plan to follow up in 4 - 6 months.     Keep wearing the oxygen at night     When you stop the prednisone, you can also be done with sulfamethoxazole-trimethoprim (BACTRIM DS)

## 2024-03-11 NOTE — LETTER
3/11/2024         RE: [facility]  [address] [facility] [address]        Dear Colleague,    Thank you for referring your patient, Mike Schultz, to the [facility]. Please see a copy of my visit note below.      Virtual Visit Details    Type of service:  Video Visit     Originating Location (pt. Location): Home    Distant Location (provider location):  Off-site [place] office  Platform used for Video Visit: Jeremy  Joined the call at 3/11/2024, 9:35:35 am.  Left the call at 3/11/2024, 9:59:03 am.    [facility] Follow Up  Visit    Reason for Visit  Mike Schultz is a 73 year old male who is seen in follow up for lung nodules and amiodarone pneumonitis  Pulmonary HPI    - Since our last visit he has continued to have trouble with atrial fibrillation. He remained in sinus rhythm for about 4 months after cardioversion in October. He does not sleep well, he has nocturia. He does use CPAP, the machine itself is about 3 years old, but initial diagnosis was in 2016. His weight has definitely fluctuated    September, he was hospitalized with acute respiratory failure that was attributed to amiodarone toxicity and he was discharged on a prednisone taper that lasts for several months. He is currently on last week of prednisone taper he is on 10 mg every other day, recently finishe Bactrim for prophylaxis.    - aorta screening discovered incidental lung nodules  Back in 2022 he had a CT for chest pain and there were no nodules on that one  - no episodes of pneumonia or bad cold    Other active medical problems include:   - amiodarone-induced pneumonitis with hospitalization   - atrial fibrillation on Xarelto, cardioversion most recently in October 2023  - Bell's Palsy on Right last year with some residual symptoms    - BOWEN on CPAP (oxygen bleed in since hospitalization Fall 2023) diagnosed in 2016    Exposure history:  Denies asbestos or radon exposure   TB risk factors: No  Prior Imaging:Yes  Constitutional Symptoms: No  Personal history of cancer:Yes  Up to date on age-appropriate cancer screening:No    ROS Pulmonary  Dyspnea: Yes, Cough: No, Chest pain: No, Wheezing: No, Sputum Production: No, Hemoptysis: No  A complete ROS was otherwise negative except as noted in the HPI.  The patient was seen and examined by Jeanette Mckeon MD   Current Outpatient Medications   Medication     allopurinol (ZYLOPRIM) 300 MG tablet     cetirizine (ZYRTEC) 10 MG tablet     finasteride (PROPECIA) 1 MG tablet     losartan (COZAAR) 100 MG tablet     metoprolol tartrate (LOPRESSOR) 25 MG tablet     potassium chloride pushpa ER (KLOR-CON M20) 20 MEQ CR tablet     predniSONE (DELTASONE) 10 MG tablet     rivaroxaban ANTICOAGULANT (XARELTO ANTICOAGULANT) 20 MG TABS tablet     spironolactone (ALDACTONE) 25 MG tablet     torsemide (DEMADEX) 20 MG tablet     No current facility-administered medications for this visit.     Allergies   Allergen Reactions     Amiodarone Shortness Of Breath     Suspected amiodarone toxicity involving lungs     Social History     Socioeconomic History     Marital status: Single     Spouse name: Not on file     Number of children: Not on file     Years of education: Not on file     Highest education level: Not on file   Occupational History     Not on file   Tobacco Use     Smoking status: Former     Packs/day: 1.00     Years: 10.00     Additional pack years: 0.00     Total pack years: 10.00     Types: Cigarettes     Start date:      Quit date: 2011     Years since quittin.5     Smokeless tobacco: Never     Tobacco comments:     quit    Vaping Use     Vaping Use: Never used   Substance and Sexual Activity     Alcohol use: Not Currently     Drug use: Never     Sexual activity: Not Currently     Partners: Female     Birth control/protection: None   Other Topics Concern     Parent/sibling w/ CABG, MI or  angioplasty before 65F 55M? 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     Social Determinants of Health     Financial Resource Strain: Not on file   Food Insecurity: Not on file   Transportation Needs: Not on file   Physical Activity: Not on file   Stress: Not on file   Social Connections: Not on file   Interpersonal Safety: Not on file   Housing Stability: Not on file     Past Medical History:   Diagnosis Date     (HFpEF) heart failure with preserved ejection fraction (H)      Acute gouty arthritis      Alcohol use disorder in remission      Amiodarone pulmonary toxicity      Aortic stenosis      Basal cell carcinoma     Of the nose     Bell's palsy      Herpes simplex without mention of complication      Hyperlipidemia      Hypertension 2000     Persistent atrial fibrillation (H)     persistent, DCCV 12/2017, ablation 11/6/17     Pulmonary nodules      Sleep apnea     CPAP     Past Surgical History:   Procedure Laterality Date     ABLATION OF DYSRHYTHMIC FOCUS  2018, 04/12/2019    Procedure: EP Ablation Focal AFIB;  Surgeon: Fady Day MD;  Location:  HEART CARDIAC CATH LAB     ACHILLES TENDON SURGERY  1997     ANESTHESIA CARDIOVERSION N/A 2/26/2019    Procedure: ANESTHESIA CARDIOVERSION (CONNER);  Surgeon: GENERIC ANESTHESIA PROVIDER;  Location:  OR     ANESTHESIA CARDIOVERSION N/A 11/19/2020    Procedure: ANESTHESIA, FOR CARDIOVERSION (dr campbell);  Surgeon: GENERIC ANESTHESIA PROVIDER;  Location:  OR     ANESTHESIA CARDIOVERSION N/A 8/22/2022    Procedure: CARDIOVERSION;  Surgeon: GENERIC ANESTHESIA PROVIDER;  Location:  OR     ANESTHESIA CARDIOVERSION N/A 9/13/2023    Procedure: Anesthesia cardioversion;  Surgeon: GENERIC ANESTHESIA PROVIDER;  Location:  OR     ANESTHESIA CARDIOVERSION N/A 10/6/2023    Procedure: Anesthesia cardioversion;  Surgeon: GENERIC ANESTHESIA PROVIDER;  Location: [facility]  CELL CARCINOMA EXCISION Right     nose, took cartilage from inner ear.     CARDIOVERSION  ,     cardioversion for atr fib     COLONOSCOPY       COLONOSCOPY N/A 3/22/2023    Procedure: COLONOSCOPY, FLEXIBLE, WITH LESION REMOVAL USING SNARE;  Surgeon: Anne Lucero MD;  Location:  GI     EP ABLATION FOCAL AFIB N/A 2019    Procedure: EP Ablation Focal AFIB;  Surgeon: Fady Day MD;  Location:  HEART CARDIAC CATH LAB     HERNIA REPAIR, UMBILICAL  2012    Procedure: HERNIORRHAPHY UMBILICAL;  UMBILICAL HERNIA REPAIR;  Surgeon: Ra Crocker MD;  Location: [facility]     HERNIORRHAPHY UMBILICAL  2012    Procedure: HERNIORRHAPHY UMBILICAL;  UMBILICAL HERNIA REPAIR;  Surgeon: Ra Crocker MD;  Location: [facility]     ORTHOPEDIC SURGERY       ORTHOPEDIC SURGERY       OTHER SURGICAL HISTORY Right 2017    total KNEE ARTHROSCOPY      TOTAL KNEE ARTHROPLASTY Left 2018     ZZC NONSPECIFIC PROCEDURE  1997    achilles tendon     ZZC NONSPECIFIC PROCEDURE      knees, arthroscopy     ZZC NONSPECIFIC PROCEDURE      basal cell skin ca, nose     ZZC NONSPECIFIC PROCEDURE      flex sig; colonoscopy due 3/2008     ZZC NONSPECIFIC PROCEDURE      cardioversion for atr fib     ZZC TOTAL KNEE ARTHROPLASTY Left 2018    Dr. Malick Suarez     Family History   Problem Relation Age of Onset     Family History Negative Mother      Heart Disease Father         decesased at 42 - [place]     Heart Disease Sister          MI     Lipids Sister      Lipids Sister      No Known Problems Sister      Heart Disease Brother          MI     Heart Disease Brother         CABG AT 49     Lung Cancer No family hx of        Exam:   There were no vitals taken for this visit.  GENERAL APPEARANCE: Well developed, well nourished, alert, and in no apparent distress.  PSYCH: mentation appears normal. and affect normal/bright  Results:  - My interpretation of the images relevant for this  visit includes: CT chest imaging reviewed today's images compared Dec 2022, Aug 2023, Sept 2023 multiple nodules in the  base of both lungs mostly improved / resolved as the intersitial opacities also improve but not completely resolved  - My interpretation of the PFT's relevant for this visit includes: normal spirometry and lung volumes, mildly reduced diffusion, spirometry and lung volumes slightly increased from December 2023 but DLCO has slightly declined       Assessment and plan: Brayden is a 74 yo male with incidental lung nodules, atrial fibrillation and recent probable amiodarone pulmonary toxicity.    Lung nodule - seen on chest CT, not seen in 2022, no significant change between Aug and Sept, unusual in appearance, no respiratory symptoms.     Nodify reduced risk (11%); most are resolved or improved now. Based on final interpretation from CT today and my own review I don't think any additional follow up is needed for these. Perhaps they represented early amiodarone reaction? As they were detected while on amio but prior to the hospitalization for respiratory failure.   Amiodarone toxicity - hospitalized with acute respiratory failure, symptoms and imaging and oximetry improved. PFTs borderline normal.   He was prescribed a prolonged steroid taper from the hospital and will continue through the end of the month. Repeat CT and PFT indicate resolution of amiodarone pneumonitis.   BOWEN - given poor sleep and persistent difficult to manage atrial fibrillation, I recommend he reconsult with sleep medicine to ensure adequate nocturnal ventilation. I suggest he continue supplemental oxygen via CPAP in the meantime.   Follow up with me in 4-6 months to see how things are going - no plan for CT chest at this time.       Again, thank you for allowing me to participate in the care of your patient.        Sincerely,        Jeanette Mckeon MD

## 2024-03-11 NOTE — PROGRESS NOTES
Virtual Visit Details    Type of service:  Video Visit     Originating Location (pt. Location): Home    Distant Location (provider location):  Off-site University office  Platform used for Video Visit: Jeremy  Joined the call at 3/11/2024, 9:35:35 am.  Left the call at 3/11/2024, 9:59:03 am.    [facility] Follow Up  Visit    Reason for Visit  Mike Schultz is a 73 year old male who is seen in follow up for lung nodules and amiodarone pneumonitis  Pulmonary HPI    - Since our last visit he has continued to have trouble with atrial fibrillation. He remained in sinus rhythm for about 4 months after cardioversion in October. He does not sleep well, he has nocturia. He does use CPAP, the machine itself is about 3 years old, but initial diagnosis was in 2016. His weight has definitely fluctuated    September, he was hospitalized with acute respiratory failure that was attributed to amiodarone toxicity and he was discharged on a prednisone taper that lasts for several months. He is currently on last week of prednisone taper he is on 10 mg every other day, recently finishe Bactrim for prophylaxis.    - aorta screening discovered incidental lung nodules  Back in 2022 he had a CT for chest pain and there were no nodules on that one  - no episodes of pneumonia or bad cold    Other active medical problems include:   - amiodarone-induced pneumonitis with hospitalization   - atrial fibrillation on Xarelto, cardioversion most recently in October 2023  - Bell's Palsy on Right last year with some residual symptoms    - BOWEN on CPAP (oxygen bleed in since hospitalization Fall 2023) diagnosed in 2016    Exposure history: Denies asbestos or radon exposure   TB risk factors: No  Prior Imaging:Yes  Constitutional Symptoms: No  Personal history of cancer:Yes  Up to date on age-appropriate cancer screening:No    ROS Pulmonary  Dyspnea: Yes, Cough: No, Chest pain: No, Wheezing: No, Sputum  Production: No, Hemoptysis: No  A complete ROS was otherwise negative except as noted in the HPI.  The patient was seen and examined by Jeanette Mckeon MD   Current Outpatient Medications   Medication    allopurinol (ZYLOPRIM) 300 MG tablet    cetirizine (ZYRTEC) 10 MG tablet    finasteride (PROPECIA) 1 MG tablet    losartan (COZAAR) 100 MG tablet    metoprolol tartrate (LOPRESSOR) 25 MG tablet    potassium chloride pushpa ER (KLOR-CON M20) 20 MEQ CR tablet    predniSONE (DELTASONE) 10 MG tablet    rivaroxaban ANTICOAGULANT (XARELTO ANTICOAGULANT) 20 MG TABS tablet    spironolactone (ALDACTONE) 25 MG tablet    torsemide (DEMADEX) 20 MG tablet     No current facility-administered medications for this visit.     Allergies   Allergen Reactions    Amiodarone Shortness Of Breath     Suspected amiodarone toxicity involving lungs     Social History     Socioeconomic History    Marital status: Single     Spouse name: Not on file    Number of children: Not on file    Years of education: Not on file    Highest education level: Not on file   Occupational History    Not on file   Tobacco Use    Smoking status: Former     Packs/day: 1.00     Years: 10.00     Additional pack years: 0.00     Total pack years: 10.00     Types: Cigarettes     Start date:      Quit date: 2011     Years since quittin.5    Smokeless tobacco: Never    Tobacco comments:     quit    Vaping Use    Vaping Use: Never used   Substance and Sexual Activity    Alcohol use: Not Currently    Drug use: Never    Sexual activity: Not Currently     Partners: Female     Birth control/protection: None   Other Topics Concern    Parent/sibling w/ CABG, MI or angioplasty before 65F 55M? Not Asked   Social History Narrative    Worked in retail () and insurance sales     Spends some time on [place] on [address]     Social Determinants of Health     Financial Resource Strain: Not on file   Food Insecurity: Not on file   Transportation Needs: Not  on file   Physical Activity: Not on file   Stress: Not on file   Social Connections: Not on file   Interpersonal Safety: Not on file   Housing Stability: Not on file     Past Medical History:   Diagnosis Date    (HFpEF) heart failure with preserved ejection fraction (H)     Acute gouty arthritis     Alcohol use disorder in remission     Amiodarone pulmonary toxicity     Aortic stenosis     Basal cell carcinoma     Of the nose    Bell's palsy     Herpes simplex without mention of complication     Hyperlipidemia     Hypertension 2000    Persistent atrial fibrillation (H)     persistent, DCCV 12/2017, ablation 11/6/17    Pulmonary nodules     Sleep apnea     CPAP     Past Surgical History:   Procedure Laterality Date    ABLATION OF DYSRHYTHMIC FOCUS  2018, 04/12/2019    Procedure: EP Ablation Focal AFIB;  Surgeon: Fady Day MD;  Location:  HE[facility]ARDI[facility] CATH LAB    ACHILLES TENDON SURGERY  1997    ANESTHESIA CARDIOVERSION N/A 2/26/2019    Procedure: ANESTHESIA CARDIOVERSION (CONNER);  Surgeon: GENERIC ANESTHESIA PROVIDER;  Location:  OR    ANESTHESIA CARDIOVERSION N/A 11/19/2020    Procedure: ANESTHESIA, FOR CARDIOVERSION (dr campbell);  Surgeon: GENERIC ANESTHESIA PROVIDER;  Location:  OR    ANESTHESIA CARDIOVERSION N/A 8/22/2022    Procedure: CARDIOVERSION;  Surgeon: GENERIC ANESTHESIA PROVIDER;  Location:  OR    ANESTHESIA CARDIOVERSION N/A 9/13/2023    Procedure: Anesthesia cardioversion;  Surgeon: GENERIC ANESTHESIA PROVIDER;  Location:  OR    ANESTHESIA CARDIOVERSION N/A 10/6/2023    Procedure: Anesthesia cardioversion;  Surgeon: GENERIC ANESTHESIA PROVIDER;  Location:  OR    BASAL CELL CARCINOMA EXCISION Right 2009    nose, took cartilage from inner ear.    CARDIOVERSION  2012, 2019    cardioversion for atr fib    COLONOSCOPY  2008    COLONOSCOPY N/A 3/22/2023    Procedure: COLONOSCOPY, FLEXIBLE, WITH LESION REMOVAL USING SNARE;  Surgeon: Anne Lucero MD;  Location:  GI    EP ABLATION  FOCAL AFIB N/A 2019    Procedure: EP Ablation Focal AFIB;  Surgeon: Fady Day MD;  Location:  HEART CARDIAC CATH LAB    HERNIA REPAIR, UMBILICAL  2012    Procedure: HERNIORRHAPHY UMBILICAL;  UMBILICAL HERNIA REPAIR;  Surgeon: Ra Crocker MD;  [facility]    HERNIORRHAPHY UMBILICAL  2012    Procedure: HERNIORRHAPHY UMBILICAL;  UMBILICAL HERNIA REPAIR;  Surgeon: Ra Crocker MD;  Location: [facility]    ORTHOPEDIC SURGERY      ORTHOPEDIC SURGERY      OTHER SURGICAL HISTORY Right 2017    total KNEE ARTHROSCOPY     TOTAL KNEE ARTHROPLASTY Left 2018    ZZ NONSPECIFIC PROCEDURE      achilles tendon    ZZC NONSPECIFIC PROCEDURE      knees, arthroscopy    ZZC NONSPECIFIC PROCEDURE      basal cell skin ca, nose    ZZC NONSPECIFIC PROCEDURE      flex sig; colonoscopy due 3/2008    ZZ NONSPECIFIC PROCEDURE      cardioversion for atr fib    ZZC TOTAL KNEE ARTHROPLASTY Left 2018    Dr. Malick Saurez     Family History   Problem Relation Age of Onset    Family History Negative Mother     Heart Disease Father         decesased at 42 - MI    Heart Disease Sister          MI    Lipids Sister     Lipids Sister     No Known Problems Sister     Heart Disease Brother          MI    Heart Disease Brother         CABG AT 49    Lung Cancer No family hx of        Exam:   There were no vitals taken for this visit.  GENERAL APPEARANCE: Well developed, well nourished, alert, and in no apparent distress.  PSYCH: mentation appears normal. and affect normal/bright  Results:  - My interpretation of the images relevant for this visit includes: CT chest imaging reviewed today's images compared Dec 2022, Aug 2023, 2023 multiple nodules in the  base of both lungs mostly improved / resolved as the intersitial opacities also improve but not completely resolved  - My interpretation of the PFT's relevant for this visit includes: normal spirometry and lung volumes, mildly  reduced diffusion, spirometry and lung volumes slightly increased from December 2023 but DLCO has slightly declined       Assessment and plan: Brayden is a 74 yo male with incidental lung nodules, atrial fibrillation and recent probable amiodarone pulmonary toxicity.    Lung nodule - seen on chest CT, not seen in 2022, no significant change between Aug and Sept, unusual in appearance, no respiratory symptoms.     Nodify reduced risk (11%); most are resolved or improved now. Based on final interpretation from CT today and my own review I don't think any additional follow up is needed for these. Perhaps they represented early amiodarone reaction? As they were detected while on amio but prior to the hospitalization for respiratory failure.   Amiodarone toxicity - hospitalized with acute respiratory failure, symptoms and imaging and oximetry improved. PFTs borderline normal.   He was prescribed a prolonged steroid taper from the hospital and will continue through the end of the month. Repeat CT and PFT indicate resolution of amiodarone pneumonitis.   BOWEN - given poor sleep and persistent difficult to manage atrial fibrillation, I recommend he reconsult with sleep medicine to ensure adequate nocturnal ventilation. I suggest he continue supplemental oxygen via CPAP in the meantime.   Follow up with me in 4-6 months to see how things are going - no plan for CT chest at this time.

## 2024-03-12 ENCOUNTER — OFFICE VISIT (OUTPATIENT)
Dept: SLEEP MEDICINE | Facility: CLINIC | Age: 74
End: 2024-03-12
Attending: INTERNAL MEDICINE
Payer: COMMERCIAL

## 2024-03-12 VITALS
WEIGHT: 294.2 LBS | DIASTOLIC BLOOD PRESSURE: 74 MMHG | OXYGEN SATURATION: 95 % | SYSTOLIC BLOOD PRESSURE: 109 MMHG | HEIGHT: 68 IN | BODY MASS INDEX: 44.59 KG/M2 | HEART RATE: 90 BPM

## 2024-03-12 DIAGNOSIS — I48.20 CHRONIC ATRIAL FIBRILLATION (H): ICD-10-CM

## 2024-03-12 DIAGNOSIS — G47.33 OSA (OBSTRUCTIVE SLEEP APNEA): Primary | ICD-10-CM

## 2024-03-12 PROCEDURE — 99215 OFFICE O/P EST HI 40 MIN: CPT | Performed by: INTERNAL MEDICINE

## 2024-03-12 ASSESSMENT — SLEEP AND FATIGUE QUESTIONNAIRES
HOW LIKELY ARE YOU TO NOD OFF OR FALL ASLEEP WHILE SITTING AND TALKING TO SOMEONE: WOULD NEVER DOZE
HOW LIKELY ARE YOU TO NOD OFF OR FALL ASLEEP WHEN YOU ARE A PASSENGER IN A CAR FOR AN HOUR WITHOUT A BREAK: SLIGHT CHANCE OF DOZING
HOW LIKELY ARE YOU TO NOD OFF OR FALL ASLEEP WHILE WATCHING TV: SLIGHT CHANCE OF DOZING
HOW LIKELY ARE YOU TO NOD OFF OR FALL ASLEEP WHILE SITTING QUIETLY AFTER LUNCH WITHOUT ALCOHOL: SLIGHT CHANCE OF DOZING
HOW LIKELY ARE YOU TO NOD OFF OR FALL ASLEEP WHILE SITTING INACTIVE IN A PUBLIC PLACE: SLIGHT CHANCE OF DOZING
HOW LIKELY ARE YOU TO NOD OFF OR FALL ASLEEP WHILE LYING DOWN TO REST IN THE AFTERNOON WHEN CIRCUMSTANCES PERMIT: SLIGHT CHANCE OF DOZING
HOW LIKELY ARE YOU TO NOD OFF OR FALL ASLEEP WHILE SITTING AND READING: SLIGHT CHANCE OF DOZING
HOW LIKELY ARE YOU TO NOD OFF OR FALL ASLEEP IN A CAR, WHILE STOPPED FOR A FEW MINUTES IN TRAFFIC: SLIGHT CHANCE OF DOZING

## 2024-03-12 NOTE — NURSING NOTE
"Chief Complaint   Patient presents with    CPAP Follow Up     CPAP follow up       Initial /74   Pulse 90   Ht 1.727 m (5' 7.99\")   Wt 133.4 kg (294 lb 3.2 oz)   SpO2 95%   BMI 44.74 kg/m   Estimated body mass index is 44.74 kg/m  as calculated from the following:    Height as of this encounter: 1.727 m (5' 7.99\").    Weight as of this encounter: 133.4 kg (294 lb 3.2 oz).    Medication Reconciliation: complete  ESS 7  Je Abarca MA         "

## 2024-03-12 NOTE — PROGRESS NOTES
[facility]   Outpatient Sleep Medicine Follow-up Visit  March 12, 2024    Name: Mike Schultz MRN# [id number]   Age: 73 year old YOB: 1950     Date of Consultation: March 12, 2024  Consultation is requested by: Jeanette Mckeon MD  [address]  [place] [address]  Primary care provider: Ranjan Courtney           Assessment and Plan:     Sleep Diagnoses:  BOWEN on CPAP  Download data from CPAP was reviewed, appears to have some remnant AHI of 10 and adjustments to settings was made remotely and now increased 5 - 15 cmH2O and will review the download again at a later time.    Chronic insufficient sleep: We discussed the consequences of insufficient sleep. Recommended to follow regular sleep schedule, increasing sleep duration by advancing the bedtime during weekdays and aim at obtaining at least 7 to 8 hours per night and avoid sleep deprivation.     Atrial fibrillation: We discussed the association of BOWEN with atrial fibrillation and other cardiovascular diseases.  He will continue follow-up with cardiology     Obesity: We discussed weight management with diet and exercise        Summary Counseling:         History of Present Illness:   I had the pleasure of seeing Mike Schultz, who is a pleasant 73 year old male with a history of BOWEN who presents for CPAP follow up. She was previously seen by Dr. Rincon in 2022.  To briefly review, Brayden lost his CPAP device 3-4 years ago and was interested in obtaining a new CPAP device.  He reported snoring and nonrestorative sleep. His weight at the time of the previous sleep study was 274 pounds.   He was restarted on CPAP after the last clinic visit with auto CPAP device with pressure settings between 6-13 cmH2O.   Overall, the patient rates their experience with PAP as 10 (0 poor, 10 great). The mask is comfortable. The mask is not leaking, 0 nights per week. They are not snoring with the mask  on. They are not having gasp arousals.  They are not having significant oral/nasal dryness. The pressure settings are comfortable.     Patient uses full-face mask.    SLEEP-WAKE SCHEDULE:      Work/School Days: working full time              Usually gets into bed at 10-12 midnight  Takes patient about 45 minutes to fall asleep  Has trouble falling asleep 30 % nights per week  Wakes up in the middle of the night 1x  to go to bathroom  around 2 a.m. times.  Wakes up due to Use the bathroom  He has trouble falling back asleep 20% times a week.   It usually takes 20 minutes to get back to sleep.   Patient is usually up at 6 a.m.  Uses alarm: Yes     Weekends/Non-work Days/All Other Days:  Usually gets into bed at 9-12 midnight   Takes patient about 1 hour to fall asleep  Patient is usually up at 6 a.m. go back to sleep and finally wake up at 9-10am  Uses alarm: Yes     Sleep Need  Patient gets  4-5 hrs sleep on average            Patient thinks he needs about 7-8 hrs sleep     Naps  Patient takes a purposeful nap: never   He dozes off unintentionally never days per week  Patient has had a driving accident or near-miss due to sleepiness/drowsiness: No       PREVIOUS SLEEP STUDIES: PSG 9/20/2016  Respiration: Severe obstructive sleep apnea with both hypoxemia and hypoventilation.  Events - During the diagnostic portion of the study, the polysomnogram revealed a presence of 52 obstructive, 23 central, and 6 mixed apneas resulting in an apnea index of 35.7 events per hour.  There were 87 hypopneas resulting in a hypopnea index of 38.4 events per hour.  The combined apnea/hypopnea index was 74.1 events per hour.  The REM AHI was 72.0 events per hour.  The supine AHI was 79.1 events per hour.  The RERA index was - events per hour.  The RDI was 74.1 events per hour.   Snoring - was reported as loud.  Respiratory rate and pattern - was normal.  Sustained Sleep Associated Hypoventilation - Transcutaneous carbon dioxide  monitoring was used, and significant hypoventilation was present with a maximum change of 9 mmHg (maximum value of 55 mmHg).  Sleep Associated Hypoxemia - (Greater than 5 minutes O2 sat below 89%) was present.  Baseline oxygen saturation was 88.6%. Lowest oxygen saturation was 60.6%.  Time spent less than or equal to 88% was 106.8 minutes.  Time spent less than or equal to 89% was 129.4 minutes.  74.1 - 74.1      Treatment PSG  Sleep Architecture: On PAP, improved arousal index and more stable sleep, albeit with 2 prolonged episodes of wakefulness.  At 02:06:08 AM the patient was placed on CPAP treatment and was titrated at pressures ranging from 5 cmH2O up to 15 cmH2O.  The total recording time of the treatment portion of the study was 276.6 minutes.  The total sleep time was 175.5 minutes.  During the treatment portion of the study the sleep latency was 27.5 minutes.  REM latency was 39.0 minutes.  Arousal index was increased at 21.2 arousals per hour.  Sleep efficiency was decreased at 63.4%.  Wake after sleep onset was 70.0 minutes.  The patient spent 14.8% of total sleep time in Stage N1, 34.5% in Stage N2, 0.0% in Stage N3 and 50.7% in REM.        Respiration: Adequate titration despite development of treatment-emergent central sleep apnea.  The final pressure was 15 cmH2O with an AHI of 15.6 events per hour.  Time in REM supine on final pressure was - minutes.   This titration was considered:  adequate (residual AHI with 75% decrease, or above constraints without REM-supine sleep at final pressure).     Movement Activity: Minimal increase in PLMs noted.  Excessive REM EMG activity without sherrell dream enactment behavior.  Periodic Limb Movements  During the diagnostic portion of the study, there were - PLMs recorded. The PLM index was - movements per hour.  The PLM Arousal Index was - per hour.  During the treatment portion of the study, there were 24 PLMs recorded. The PLM index was 8.2 movements per hour.   The PLM Arousal Index was 0.3 per hour.  REM EMG Activity - Excessive transient muscle activity was present.  Nocturnal Behavior - Abnormal sleep related behaviors were not noted.  Bruxism - None apparent.     Cardiac Summary: Persistent atrial fibrillation.  During the diagnostic portion of the study, the average pulse rate was 61.6 bpm.  The minimum pulse rate was 46.9 bpm while the maximum pulse rate was 78.1 bpm.         Most Recent SCALES:    EPWORTH SLEEPINESS SCALE WITHIN 1 YEAR WITHIN 10 DAYS   Sitting and reading 1 1   Watching TV 1 1   Sitting, inactive in a public place (theatre or mtg.) 1  1    As a passenger in a car 1 1   Lying down to rest in the afternoon when circumstance permit 1 1   Sitting and talking to someone 0 0   Sitting quietly after lunch without alcohol 1 1   In a car, while stopped for a few minutes in traffic 1 1   TOTAL SCORE 7 7   Normal < 11         0--none    1--mild    2--moderate  3--severe      INSOMNIA SEVERITY INDEX WITHIN 1 YEAR   Difficulty falling asleep 3   Difficult staying asleep 3   Problems waking up to early 3   How SATISFIED/DISSATISFIED are you with your CURRENT sleep pattern? 3   How NOTICEABLE to others do you think your sleep pattern is in terms of your quality of life? 2   How WORRIED/DISTRESSED are you about your current sleep pattern? 3   To what extent do you consider your sleep problem to INTERFERE with your daily fuctioning(e.g. daytime fatigue, mood, ability to function at work/daily chores, concentration, mood,etc.) CURRENTLY? 2   INSOMNIA SEVERITY INDEX TOTAL SCORE 19    --absence of insomnia (0-7); sub-threshold insomnia (8-14); moderate insomnia (15-21); and severe insomnia (22-28)--          BERTHA Total Score: 19      Objective:  CPAP Compliance Targets:   >70% days > 4 hours AHI < 5   30 days ending March 12, 2024  Mask type:  Nasal Pillows   ResMed   Auto-PAP 6.0 - 13.0 cmH2O 30 day usage data:    36% of days with > 4 hours of use. 2/30 days with no  use.   Average use 227 minutes per day.   95%ile Leak 30.17 L/min.   CPAP 95% pressure 12.5 cm.   AHI 10 events per hour.                   Medications:     Current Outpatient Medications   Medication Sig    allopurinol (ZYLOPRIM) 300 MG tablet Take 1 tablet (300 mg) by mouth daily    cetirizine (ZYRTEC) 10 MG tablet Take 10 mg by mouth as needed for allergies    finasteride (PROPECIA) 1 MG tablet Take 1 tablet (1 mg) by mouth daily    losartan (COZAAR) 100 MG tablet Take 0.5 tablets (50 mg) by mouth daily    metoprolol tartrate (LOPRESSOR) 25 MG tablet Take 1 tablet (25 mg) by mouth 2 times daily    potassium chloride pushpa ER (KLOR-CON M20) 20 MEQ CR tablet Take 1 tablet (20 mEq) by mouth daily    predniSONE (DELTASONE) 10 MG tablet Take 6 tablets (60 mg) by mouth daily for 10 days, THEN 5 tablets (50 mg) daily for 14 days, THEN 4 tablets (40 mg) daily for 14 days, THEN 3 tablets (30 mg) daily for 14 days, THEN 3 tablets (30 mg) daily for 14 days, THEN 2 tablets (20 mg) daily for 14 days, THEN 1 tablet (10 mg) daily for 14 days, THEN 1 tablet (10 mg) every 48 hours for 14 days.    rivaroxaban ANTICOAGULANT (XARELTO ANTICOAGULANT) 20 MG TABS tablet Take 1 tablet (20 mg) by mouth daily (with dinner)    spironolactone (ALDACTONE) 25 MG tablet Take 0.5 tablets (12.5 mg) by mouth daily    torsemide (DEMADEX) 20 MG tablet Take 4 tablets (80 mg) by mouth daily     No current facility-administered medications for this visit.        Allergies   Allergen Reactions    Amiodarone Shortness Of Breath     Suspected amiodarone toxicity involving lungs            Problem List:     Patient Active Problem List   Diagnosis    Essential hypertension    Impotence of organic origin    Allergic rhinitis    Herpes simplex virus (HSV) infection    Class 3 obesity (H)    Tobacco use disorder, moderate, in sustained remission    Atrial fibrillation, permanent (H)    Advanced directives, counseling/discussion    Family history of ischemic  heart disease    Gout    BOWEN (obstructive sleep apnea)    Ascending aorta dilatation (H24)    Impaired fasting glucose    Aortic valve stenosis    Venous insufficiency    Bilateral carpal tunnel syndrome    Other cardiomyopathies (H)            Past Medical History:     Does not need 02 supplement at night   Past Medical History:   Diagnosis Date    (HFpEF) heart failure with preserved ejection fraction (H)     Acute gouty arthritis     Alcohol use disorder in remission     Amiodarone pulmonary toxicity     Aortic stenosis     Basal cell carcinoma     Of the nose    Bell's palsy     Herpes simplex without mention of complication     Hyperlipidemia     Hypertension 2000    Persistent atrial fibrillation (H)     persistent, DCCV 12/2017, ablation 11/6/17    Pulmonary nodules     Sleep apnea     CPAP             Past Surgical History:    No h/o  upper airway surgery  Past Surgical History:   Procedure Laterality Date    ABLATION OF DYSRHYTHMIC FOCUS  2018, 04/12/2019    Procedure: EP Ablation Focal AFIB;  Surgeon: Fady Day MD;  Location:  HE[facility] CARDI[facility] CATH LAB    ACHILLES TENDON SURGERY  1997    ANESTHESIA CARDIOVERSION N/A 2/26/2019    Procedure: ANESTHESIA CARDIOVERSION (CONNER);  Surgeon: GENERIC ANESTHESIA PROVIDER;  Location:  OR    ANESTHESIA CARDIOVERSION N/A 11/19/2020    Procedure: ANESTHESIA, FOR CARDIOVERSION (dr campbell);  Surgeon: GENERIC ANESTHESIA PROVIDER;  Location:  OR    ANESTHESIA CARDIOVERSION N/A 8/22/2022    Procedure: CARDIOVERSION;  Surgeon: GENERIC ANESTHESIA PROVIDER;  Location:  OR    ANESTHESIA CARDIOVERSION N/A 9/13/2023    Procedure: Anesthesia cardioversion;  Surgeon: GENERIC ANESTHESIA PROVIDER;  Location:  OR    ANESTHESIA CARDIOVERSION N/A 10/6/2023    Procedure: Anesthesia cardioversion;  Surgeon: GENERIC ANESTHESIA PROVIDER;  Location:  OR    BASAL CELL CARCINOMA EXCISION Right 2009    nose, took cartilage from inner ear.    CARDIOVERSION  2012, 2019    cardioversion  "for atr fib    COLONOSCOPY  2008    COLONOSCOPY N/A 3/22/2023    Procedure: COLONOSCOPY, FLEXIBLE, WITH LESION REMOVAL USING SNARE;  Surgeon: Anne Lucero MD;  Location:  GI    EP ABLATION FOCAL AFIB N/A 4/12/2019    Procedure: EP Ablation Focal AFIB;  Surgeon: Fady Day MD;  Location:  HEART CARDI[facility] CATH LAB    HERNIA REPAIR, UMBILICAL  08/20/2012    Procedure: HERNIORRHAPHY UMBILICAL;  UMBILICAL HERNIA REPAIR;  Surgeon: Ra Crocker MD;  Location: [facility]    HERNIORRHAPHY UMBILICAL  8/20/2012    Procedure: HERNIORRHAPHY UMBILICAL;  UMBILICAL HERNIA REPAIR;  Surgeon: Ra Crocker MD;  Location: [facility]    ORTHOPEDIC SURGERY      ORTHOPEDIC SURGERY      OTHER SURGICAL HISTORY Right 03/2017    total KNEE ARTHROSCOPY     TOTAL KNEE ARTHROPLASTY Left 03/16/2018    Z NONSPECIFIC PROCEDURE  1997    achilles tendon    Z NONSPECIFIC PROCEDURE      knees, arthroscopy    ZZ NONSPECIFIC PROCEDURE      basal cell skin ca, nose    ZZ NONSPECIFIC PROCEDURE      flex sig; colonoscopy due 3/2008    ZZ NONSPECIFIC PROCEDURE  2012    cardioversion for atr fib    ZZC TOTAL KNEE ARTHROPLASTY Left 03/16/2018    Dr. Malick Suarez              Physical Examination:   Objective:  Vitals: /74   Pulse 90   Ht 1.727 m (5' 7.99\")   Wt 133.4 kg (294 lb 3.2 oz)   SpO2 95%   BMI 44.74 kg/m    BMI= Body mass index is 44.74 kg/m .  Exam:  Constitutional: healthy, alert, and no distress  Head: Normocephalic. No masses, lesions, tenderness or abnormalities  ENT: ENT exam normal, no neck nodes or sinus tenderness  Cardiovascular: negative, PMI normal. No lifts, heaves, or thrills. RRR. No murmurs, clicks gallops or rub  Respiratory: negative, Percussion normal. Good diaphragmatic excursion. Lungs clear  Gastrointestinal: Abdomen soft, non-tender. BS normal. No masses, organomegaly  : Deferred  Musculoskeletal: extremities normal- no gross deformities noted, gait normal, and normal muscle tone  Skin: no " suspicious lesions or rashes  Neurologic: Gait normal. Reflexes normal and symmetric. Sensation grossly WNL.  Psychiatric: mentation appears normal and affect normal/bright        Copy to: Ranjan Courtney MD 3/12/2024         Total time spent reviewing medical records including previous testing and interpretation as well as direct patient contact and documentation on this date: 40 minutes

## 2024-03-14 NOTE — PROGRESS NOTES
[facility]    I had the pleasure of seeing Brayden when he came for follow up of AFib and HF.  This 73 year old sees Dr. Day for his history of:    1. H/o persistent AFib. EF 50-55% 1/2022 - first dx'd in 2012. Failed flecainide (and noted scar on MRI). Underwent PVI and SVC isolation 11/2017. Repeat PVI and CTI for inducible AFlutter 4/2019. Started on Amio with DCCV 11/19/2020 for recurrent AFib in setting of increased alcohol use.   Amiodarone decreased 1/2021; back in persistent AFib 12/2021 and amiodarone stopped as felt asx'c.  This was restarted 8/2022 prior to DCCV 8/22/2022. Amiodarone stopped again 1/2023. AFib restarted 7/2023 after EtOH use, now s/p DCCV (unsuccessful 9/13), then loaded with amiodarone s/p successful DCCV 10/6/2023.  Dr. Day recommended repeat AFib ablation if he started having AFib without it being triggered by EtOH. Amiodarone stopped 11/2023 d/t discovery of pneumonitis.  Most recent CT and PFTs 3/2024 showed improvement/resolution  2. Moderate Aortic Stenosis   3. Benign Essential HTN   4. BOWEN  - on CPAP  5. Myocardiac Scar noted on cardiac MRI 10/2017, consistent with prior MI in the left Cx territory. EF 51%.  Avoiding flecainide use  6. H/o Alcohol dependence - no EtOH since 2017, then again 3-4 months in early 2022 after he was noted to be back in AFib.Back in AFib with EtOH use 7/2023  7. Chronic AC  On Xarelto for CHADSVASc 4 (HFpEF, HTN, age, aortic plaquing)  8. Bell's Palsy  9. Lung nodules- noted on CTA to follow-up on aorta 8/2023.  Last CT 3/2024 showed improvement but not complete resolution of pulmonary nodules on CT.  Sees Dr. Mckeon  10. HFpEF - hospitalized 2/2024 for recurrent HFpEF in setting of persistent AF, prednisone use, moderate aortic stenosis.       Last Visit & Interval History:  I have been seeing Brayden very frequently for his HFpEF, adjusting diuretic therapy as he continues to deal with being on prednisone for pneumonitis, aortic  "stenosis (thought moderate) and recurrent persistent AF.  He was on torsemide, and when I saw him 3/7 (2 weeks ago) he was feeling a bit better after reducing losartan (for symptomatic hypotension), weaning off of prednisone (every other day dosing), and drop in weight on continue torsemide 80 mg daily.  After review of his labs, I asked him to continue torsemide 80, decrease potassium to 20 mEq daily, and restart spironolactone 12.5 mg daily.  Close follow-up with blood work recommended    He had a virtual visit with Dr. Mckeon 3/11/2024 to review his updated CT for lung nodules and amiodarone pneumonitis.  CT showed significant improvement in the multiple nodules/interstitial opacities, but not complete resolution.  PFTs showed normal spirometry/lung volumes with mildly reduced diffusion      Today's Visit:  Overall, Brayden thought things were going better over the last few weeks until about 4-5 days ago, when he noted increase in weight despite continued torsemide 80 mg daily.  It turns out he never started the spironolactone 12.5 that was communicated to him after our visit, but did cut back on his potassium dosing.    Notes continued mild lightheadedness, much improved after cutting back on losartan.    Despite improvement in PFTs, still \"horrible\" breathing, especially going up stairs.  No orthopnea/PND.  Using his CPAP daily, but notes very poor sleep as he is up all night going to the bathroom.       VITALS:  Vitals: BP (!) 127/90   Pulse 86   Ht 1.727 m (5' 7.99\")   Wt 135.5 kg (298 lb 12.8 oz)   SpO2 94%   BMI 45.45 kg/m      Diagnostic Testing:  Echocardiogram 2/11/2024 LVEF 55-60%.  Normal RV.  Moderate CHARBEL.  Mild MAC.  1+ MR.  Moderate aortic stenosis.  Ascending aortic dilatation (4.0 cm)  Limited Echo 11/2023 LVEF 55-60%. No RWMA. Nl RV. Mild MAC with 1+MR. Mean gradient AoV was 32 mmHg and MARILYN 1.22 cm2. DVI 0.29. Root 3.2 cm. Asc aorta 4.0 cm  ZioPatch 11/24-25/2023 (stopped early d/t " hospitalization) NSR 71 bpm. Occasional ectopy. No AFIb seen.  Echo 9/6/2023 showed LVEF 49%. Nl RV. Nl LA size. 1+MR. Mod AoS with mean gradient 16mmHg, Vmax 2.7 m/s, DI 0.31  Aorta 8/2023 with aneuryamsl dilation of suprarenal aorta (3.6x3.8). follow-up CTA showed no aneurysm  EKG 7/2021 showed sinus bradycardia 45 bpm.  Nonspecific ST-T wave changes  Cardiac MRI 10/2017 showed an EF of 51% with mild hypokinesis involving the basal  Component      Latest Ref Rng 2/21/2024  8:02 AM 3/1/2024  12:03 PM 3/7/2024  2:20 PM 3/21/2024  12:57 PM   Sodium      135 - 145 mmol/L 140  140  139  141    Potassium      3.4 - 5.3 mmol/L 3.5  3.6  3.6  3.1 (L)    Chloride      98 - 107 mmol/L 99  98  98  95 (L)    Carbon Dioxide (CO2)      22 - 29 mmol/L 32 (H)  30 (H)  29  36 (H)    Anion Gap      7 - 15 mmol/L 9  12  12  10    Urea Nitrogen      8.0 - 23.0 mg/dL 25.4 (H)  27.4 (H)  19.6  16.6    Creatinine      0.67 - 1.17 mg/dL 1.25 (H)  1.55 (H)  1.13  1.16    GFR Estimate      >60 mL/min/1.73m2 61  47 (L)  69  67    Calcium      8.8 - 10.2 mg/dL 8.7 (L)  8.9  8.8  8.8    Glucose      70 - 99 mg/dL 117 (H)  108 (H)  89  101 (H)           Plan:  Increase torsemide to 80 mg AM/40 mg noon  Increase potassium chloride back up to 20 mEq  BID  Start the spironolactone 12.5 mg daily as previously recommended   Limit sodium intake to 2300 mg or less/day  See me next week with BMP      Assessment/Plan:    H/o Persistent AFib  Had increasing DELEON/SOB from recurrent AFib and underwent DCCV while on amiodarone 8/2022, which was decreased 10/2022, then stopped 1/2023  Had recurrent AFib 7/2023 in setting of EtOH use. Initial DCCV 9/2023 was not successful in restoring SR and amiodarone restarted 9/2023. 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 Remained in NSR for a period of time, now back in AF    Focus has been on rate control given  inability to use AAD therapy (pulmonary fibrosis with amiodarone, scar on cardiac MRI)  Has remained on anticoagulation with Xarelto for [id number] 4 (HFpEF, HTN, age, aortic plaquing)    PLAN:  Reviewed with Brayden that atrial fibrillation is certainly not helping his shortness of breath/HFpEF, but at this time, our options for treatment are limited given pulmonary toxicity with amiodarone, and scarring on MRI.    Current plan is to continue rate control with metoprolol  Continue anticoagulation  See Dr. Day for long-term mgmt of AFib 5/2024.  Reviewed that he may recommend repeat testing to see if flecainide could be used, or could consider repeat ablation versus use of sotalol (watching lung status carefully).  Reviewed that AVN and ablation/PPM would likely not be helpful for his symptoms given his heart rate has been controlled in AF and he remains symptomatic  Updated echo before he sees Dr. Day    Recurrent HFpEF  Had this in the past with recurrent atrial fibrillation, which is why we have tried to maintain SR  Unfortunately, back in AF and not able to use amiodarone due to his pneumonitis and have had to adopt rate control strategy.  Aortic stenosis also thought to be contributing, but at this time, appears moderate per TAVR team  Switching Lasix to torsemide 80 mg daily on 2/23 seemed to improve weight/breathing/edema.  He is continued on torsemide 80 mg daily, and spironolactone was added after last visit 3/8, but he did not recognize that he was supposed to start this    Weight is stable/a little up at home    PLAN:  Increase torsemide to 80 mg AM/40 mg noon  Increase potassium chloride back up to 20 mEq  BID  Start the spironolactone 12.5 mg daily as previously recommended   Limit sodium intake to 2300 mg or less/day  See me back next week with BMP  Updated echo before he sees Dr. Day    Pulmonary Fibrosis  Amiodarone has been used off/on given c/o increasing DELEON/HFpEF when in persistent AFib since  10/2021.  Most recently restarted after unsuccessful DCCV 9/2023   Amiodarone now STOPPED 11/2023 and added to allergy list d/t pneumonitis  Had routine follow-up with Dr. Mckeon for pulmonary nodules and pneumonitis and PFTs improved.  CT also improved.    PLAN:  Continue routine follow-up with Dr. Mckeon    HTN  Currently on torsemide 80 mg daily, metoprolol tartrate 25 mg BID, amlodipine 5 mg daily. Losartan reduced from 100 mg to 50 mg daily during visit 3/1 d/t hypotension, lightheadedness, nausea, which improved with higher BPs    PLAN:  Continue current medications, add spironolactone 12.5 as above    4. Aortic Stenosis  Mild/moderate on echo 10/2022, stable on echo 9/2023, up to at least moderate on echo while hospitalized 2/2024    PLAN:  Appointment with [facility] has been recommended but still appears to be moderate AoS per Coordinator.  Repeat echo 5/2024 before he sees Dr. Day      The longitudinal plan of care for the diagnosis(es)/condition(s) as documented were addressed during this visit. Due to the added complexity in care, I will continue to support Brayden in the subsequent management and with ongoing continuity of care.    Crystal Campos PA-C, MSPAS      Orders Placed This Encounter   Procedures    Basic metabolic panel    Follow-Up with Cardiology [facility]     Orders Placed This Encounter   Medications    spironolactone (ALDACTONE) 25 MG tablet     Sig: Take 0.5 tablets (12.5 mg) by mouth daily     Dispense:  45 tablet     Refill:  2    potassium chloride pushpa ER (KLOR-CON M20) 20 MEQ CR tablet     Sig: Take 1 tablet (20 mEq) by mouth 2 times daily    torsemide (DEMADEX) 20 MG tablet     Sig: Take 4 tablets (80 mg) by mouth daily AND 2 tablets (40 mg) daily (with lunch).     Dispense:  180 tablet     Refill:  5     Note dose increase     Medications Discontinued During This Encounter   Medication Reason    spironolactone (ALDACTONE) 25 MG tablet Reorder (No AVS)    torsemide (DEMADEX) 20  MG tablet Reorder (No AVS)    potassium chloride pushpa ER (KLOR-CON M20) 20 MEQ CR tablet Reorder (No AVS)             Encounter Diagnoses   Name Primary?    Acute on chronic congestive heart failure, unspecified heart failure type (H)     Benign essential hypertension     Hypokalemia     Essential hypertension     Persistent atrial fibrillation (H)     Lower extremity edema              CURRENT MEDICATIONS:  Current Outpatient Medications   Medication Sig Dispense Refill    allopurinol (ZYLOPRIM) 300 MG tablet Take 1 tablet (300 mg) by mouth daily 90 tablet 4    cetirizine (ZYRTEC) 10 MG tablet Take 10 mg by mouth as needed for allergies      finasteride (PROPECIA) 1 MG tablet Take 1 tablet (1 mg) by mouth daily 90 tablet 4    losartan (COZAAR) 100 MG tablet Take 0.5 tablets (50 mg) by mouth daily      metoprolol tartrate (LOPRESSOR) 25 MG tablet Take 1 tablet (25 mg) by mouth 2 times daily 180 tablet 3    potassium chloride pushpa ER (KLOR-CON M20) 20 MEQ CR tablet Take 1 tablet (20 mEq) by mouth 2 times daily      rivaroxaban ANTICOAGULANT (XARELTO ANTICOAGULANT) 20 MG TABS tablet Take 1 tablet (20 mg) by mouth daily (with dinner) 90 tablet 3    spironolactone (ALDACTONE) 25 MG tablet Take 0.5 tablets (12.5 mg) by mouth daily 45 tablet 2    torsemide (DEMADEX) 20 MG tablet Take 4 tablets (80 mg) by mouth daily AND 2 tablets (40 mg) daily (with lunch). 180 tablet 5       ALLERGIES     Allergies   Allergen Reactions    Amiodarone Shortness Of Breath     Suspected amiodarone toxicity involving lungs         Review of Systems:  Skin:  Negative     Eyes:  Positive for visual blurring  ENT:  Negative    Respiratory:  Positive for sleep apnea;dyspnea on exertion  Cardiovascular:  Negative for;palpitations;chest pain edema;Positive for;exercise intolerance;fatigue  Gastroenterology: Negative for melena;hematochezia  Genitourinary:  Positive for nocturia  Musculoskeletal:  Negative for muscular weakness  Neurologic:   "Positive for    Psychiatric:  Negative    Heme/Lymph/Imm:  Negative    Endocrine:  Negative      Physical Exam:  Vitals: BP (!) 127/90   Pulse 86   Ht 1.727 m (5' 7.99\")   Wt 135.5 kg (298 lb 12.8 oz)   SpO2 94%   BMI 45.45 kg/m      Constitutional:  cooperative, alert and oriented, well developed, well nourished, in no acute distress        Skin:  warm and dry to the touch, no apparent skin lesions or masses noted        Head:  normocephalic, no masses or lesions        Eyes:  pupils equal and round;conjunctivae and lids unremarkable;sclera white        ENT:      Slight R mouth droopiness - [place] Palsy    Neck:    JVP elevated;hepatojugular reflux      Chest:  normal breath sounds, clear to auscultation, normal A-P diameter, normal symmetry, normal respiratory excursion, no use of accessory muscles        Cardiac:   irregularly irregular rhythm       systolic ejection murmur;grade 2        Abdomen:  abdomen soft obese      Vascular: pulses full and equal                                      Extremities and Back:  no deformities, clubbing, cyanosis, erythema observed   R LE cellulitis/wound improving 3/21/2024    Neurological:  no gross motor deficits            PAST MEDICAL HISTORY:  Past Medical History:   Diagnosis Date    (HFpEF) heart failure with preserved ejection fraction (H)     Acute gouty arthritis     Alcohol use disorder in remission     Amiodarone pulmonary toxicity     Aortic stenosis     Basal cell carcinoma     Of the nose    Bell's palsy     Herpes simplex without mention of complication     Hyperlipidemia     Hypertension 2000    Persistent atrial fibrillation (H)     persistent, DCCV 12/2017, ablation 11/6/17    Pulmonary nodules     Sleep apnea     CPAP       PAST SURGICAL HISTORY:  Past Surgical History:   Procedure Laterality Date    ABLATION OF DYSRHYTHMIC FOCUS  2018, 04/12/2019    Procedure: EP Ablation Focal AFIB;  Surgeon: Fady Day MD;  Location:  [facility]ARD[facility] LAB    " ACHILLES TENDON SURGERY  1997    ANESTHESIA CARDIOVERSION N/A 2/26/2019    Procedure: ANESTHESIA CARDIOVERSION (PRIETO);  Surgeon: GENERIC ANESTHESIA PROVIDER;  Location:  OR    ANESTHESIA CARDIOVERSION N/A 11/19/2020    Procedure: ANESTHESIA, FOR CARDIOVERSION (dr campbell);  Surgeon: GENERIC ANESTHESIA PROVIDER;  Location:  OR    ANESTHESIA CARDIOVERSION N/A 8/22/2022    Procedure: CARDIOVERSION;  Surgeon: GENERIC ANESTHESIA PROVIDER;  Location:  OR    ANESTHESIA CARDIOVERSION N/A 9/13/2023    Procedure: Anesthesia cardioversion;  Surgeon: GENERIC ANESTHESIA PROVIDER;  Location:  OR    ANESTHESIA CARDIOVERSION N/A 10/6/2023    Procedure: Anesthesia cardioversion;  Surgeon: GENERIC ANESTHESIA PROVIDER;  Location:  OR    BASAL CELL CARCINOMA EXCISION Right 2009    nose, took cartilage from inner ear.    CARDIOVERSION  2012, 2019    cardioversion for atr fib    COLONOSCOPY  2008    COLONOSCOPY N/A 3/22/2023    Procedure: COLONOSCOPY, FLEXIBLE, WITH LESION REMOVAL USING SNARE;  Surgeon: Anne Lucero MD;  Location:  GI    EP ABLATION FOCAL AFIB N/A 4/12/2019    Procedure: EP Ablation Focal AFIB;  Surgeon: Fady Prieto MD;  Location:  [facility]ARD[facility]ATH LAB    HERNIA REPAIR, UMBILICAL  08/20/2012    Procedure: HERNIORRHAPHY UMBILICAL;  UMBILICAL HERNIA REPAIR;  Surgeon: Ra Crocker MD;  Location:  [facility]    HERNIORRHAPHY UMBILICAL  8/20/2012    Procedure: HERNIORRHAPHY UMBILICAL;  UMBILICAL HERNIA REPAIR;  Surgeon: Ra Crocker MD;  Location: [facility]    ORTHOPEDIC SURGERY      ORTHOPEDIC SURGERY      OTHER SURGICAL HISTORY Right 03/2017    total KNEE ARTHROSCOPY     TOTAL KNEE ARTHROPLASTY Left 03/16/2018    ZZC NONSPECIFIC PROCEDURE  1997    achilles tendon    ZZC NONSPECIFIC PROCEDURE      knees, arthroscopy    ZZC NONSPECIFIC PROCEDURE      basal cell skin ca, nose    ZZC NONSPECIFIC PROCEDURE      flex sig; colonoscopy due 3/2008    ZZC NONSPECIFIC PROCEDURE  2012    cardioversion for  atr fib    ZZC TOTAL KNEE ARTHROPLASTY Left 2018    Dr. Malick Suarez       FAMILY HISTORY:  Family History   Problem Relation Age of Onset    Family History Negative Mother     Heart Disease Father         decesased at 42 - [place]    Heart Disease Sister          MI    Lipids Sister     Lipids Sister     No Known Problems Sister     Heart Disease Brother          MI    Heart Disease Brother         CABG AT 49    Lung Cancer No family hx of        SOCIAL HISTORY:  Social History     Socioeconomic History    Marital status: Single     Spouse name: None    Number of children: None    Years of education: None    Highest education level: None   Tobacco Use    Smoking status: Former     Packs/day: 1.00     Years: 10.00     Additional pack years: 0.00     Total pack years: 10.00     Types: Cigarettes     Start date:      Quit date: 2011     Years since quittin.5     Passive exposure: Past    Smokeless tobacco: Never    Tobacco comments:     quit    Vaping Use    Vaping Use: Never used   Substance and Sexual Activity    Alcohol use: Not Currently    Drug use: Never    Sexual activity: Not Currently     Partners: Female     Birth control/protection: None   Social History Narrative    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

## 2024-03-19 ENCOUNTER — DOCUMENTATION ONLY (OUTPATIENT)
Dept: ONCOLOGY | Facility: CLINIC | Age: 74
End: 2024-03-19
Payer: COMMERCIAL

## 2024-03-19 ENCOUNTER — TRANSFERRED RECORDS (OUTPATIENT)
Dept: HEALTH INFORMATION MANAGEMENT | Facility: CLINIC | Age: 74
End: 2024-03-19
Payer: COMMERCIAL

## 2024-03-19 NOTE — PROGRESS NOTES
Received positive oncology distress screen regarding patient's concern for current weight.  Patient with non-oncologic diagnosis.  Will be available for consult if ordered.      Israel Jules, RD, LD  Clinical Dietitian  [facility]  [phone] (direct)

## 2024-03-21 ENCOUNTER — OFFICE VISIT (OUTPATIENT)
Dept: CARDIOLOGY | Facility: CLINIC | Age: 74
End: 2024-03-21
Payer: COMMERCIAL

## 2024-03-21 ENCOUNTER — LAB (OUTPATIENT)
Dept: LAB | Facility: CLINIC | Age: 74
End: 2024-03-21
Payer: COMMERCIAL

## 2024-03-21 VITALS
SYSTOLIC BLOOD PRESSURE: 127 MMHG | BODY MASS INDEX: 45.28 KG/M2 | OXYGEN SATURATION: 94 % | WEIGHT: 298.8 LBS | HEIGHT: 68 IN | HEART RATE: 86 BPM | DIASTOLIC BLOOD PRESSURE: 90 MMHG

## 2024-03-21 DIAGNOSIS — I10 ESSENTIAL HYPERTENSION: ICD-10-CM

## 2024-03-21 DIAGNOSIS — I48.19 PERSISTENT ATRIAL FIBRILLATION (H): ICD-10-CM

## 2024-03-21 DIAGNOSIS — E87.6 HYPOKALEMIA: ICD-10-CM

## 2024-03-21 DIAGNOSIS — R60.0 LOWER EXTREMITY EDEMA: ICD-10-CM

## 2024-03-21 DIAGNOSIS — I50.9 ACUTE ON CHRONIC CONGESTIVE HEART FAILURE, UNSPECIFIED HEART FAILURE TYPE (H): ICD-10-CM

## 2024-03-21 DIAGNOSIS — I10 BENIGN ESSENTIAL HYPERTENSION: ICD-10-CM

## 2024-03-21 LAB
ANION GAP SERPL CALCULATED.3IONS-SCNC: 10 MMOL/L (ref 7–15)
BUN SERPL-MCNC: 16.6 MG/DL (ref 8–23)
CALCIUM SERPL-MCNC: 8.8 MG/DL (ref 8.8–10.2)
CHLORIDE SERPL-SCNC: 95 MMOL/L (ref 98–107)
CREAT SERPL-MCNC: 1.16 MG/DL (ref 0.67–1.17)
DEPRECATED HCO3 PLAS-SCNC: 36 MMOL/L (ref 22–29)
EGFRCR SERPLBLD CKD-EPI 2021: 67 ML/MIN/1.73M2
GLUCOSE SERPL-MCNC: 101 MG/DL (ref 70–99)
POTASSIUM SERPL-SCNC: 3.1 MMOL/L (ref 3.4–5.3)
SODIUM SERPL-SCNC: 141 MMOL/L (ref 135–145)

## 2024-03-21 PROCEDURE — 36415 COLL VENOUS BLD VENIPUNCTURE: CPT | Performed by: PHYSICIAN ASSISTANT

## 2024-03-21 PROCEDURE — G2211 COMPLEX E/M VISIT ADD ON: HCPCS | Performed by: PHYSICIAN ASSISTANT

## 2024-03-21 PROCEDURE — 80048 BASIC METABOLIC PNL TOTAL CA: CPT | Performed by: PHYSICIAN ASSISTANT

## 2024-03-21 PROCEDURE — 99214 OFFICE O/P EST MOD 30 MIN: CPT | Performed by: PHYSICIAN ASSISTANT

## 2024-03-21 RX ORDER — POTASSIUM CHLORIDE 1500 MG/1
20 TABLET, EXTENDED RELEASE ORAL 2 TIMES DAILY
Status: SHIPPED
Start: 2024-03-21 | End: 2024-03-29

## 2024-03-21 RX ORDER — TORSEMIDE 20 MG/1
TABLET ORAL
Qty: 180 TABLET | Refills: 5 | Status: SHIPPED | OUTPATIENT
Start: 2024-03-21 | End: 2024-04-15

## 2024-03-21 RX ORDER — SPIRONOLACTONE 25 MG/1
12.5 TABLET ORAL DAILY
Qty: 45 TABLET | Refills: 2 | Status: SHIPPED | OUTPATIENT
Start: 2024-03-21 | End: 2024-03-29

## 2024-03-21 NOTE — LETTER
3/21/2024    Ranjan Courtney MD  [address] [facility] [address]    RE: Mike Schultz       Dear Colleague,     I had the pleasure of seeing Mike Schultz in the [facility].  [facility]    I had the pleasure of seeing Brayden when he came for follow up of AFib and HF.  This 73 year old sees Dr. Day for his history of:    1. H/o persistent AFib. EF 50-55% 1/2022 - first dx'd in 2012. Failed flecainide (and noted scar on MRI). Underwent PVI and SVC isolation 11/2017. Repeat PVI and CTI for inducible AFlutter 4/2019. Started on Amio with DCCV 11/19/2020 for recurrent AFib in setting of increased alcohol use.   Amiodarone decreased 1/2021; back in persistent AFib 12/2021 and amiodarone stopped as felt asx'c.  This was restarted 8/2022 prior to DCCV 8/22/2022. Amiodarone stopped again 1/2023. AFib restarted 7/2023 after EtOH use, now s/p DCCV (unsuccessful 9/13), then loaded with amiodarone s/p successful DCCV 10/6/2023.  Dr. Day recommended repeat AFib ablation if he started having AFib without it being triggered by EtOH. Amiodarone stopped 11/2023 d/t discovery of pneumonitis.  Most recent CT and PFTs 3/2024 showed improvement/resolution  2. Moderate Aortic Stenosis   3. Benign Essential HTN   4. BOWEN  - on CPAP  5. Myocardiac Scar noted on cardiac MRI 10/2017, consistent with prior MI in the left Cx territory. EF 51%.  Avoiding flecainide use  6. H/o Alcohol dependence - no EtOH since 2017, then again 3-4 months in early 2022 after he was noted to be back in AFib.Back in AFib with EtOH use 7/2023  7. Chronic AC  On Xarelto for CHADSVASc 4 (HFpEF, HTN, age, aortic plaquing)  8. Bell's Palsy  9. Lung nodules- noted on CTA to follow-up on aorta 8/2023.  Last CT 3/2024 showed improvement but not complete resolution of pulmonary nodules on CT.  Sees Dr. Mckeon  10. HFpEF - hospitalized 2/2024 for recurrent HFpEF in setting of persistent AF, prednisone use, moderate aortic  "stenosis.       Last Visit & Interval History:  I have been seeing Brayden very frequently for his HFpEF, adjusting diuretic therapy as he continues to deal with being on prednisone for pneumonitis, aortic stenosis (thought moderate) and recurrent persistent AF.  He was on torsemide, and when I saw him 3/7 (2 weeks ago) he was feeling a bit better after reducing losartan (for symptomatic hypotension), weaning off of prednisone (every other day dosing), and drop in weight on continue torsemide 80 mg daily.  After review of his labs, I asked him to continue torsemide 80, decrease potassium to 20 mEq daily, and restart spironolactone 12.5 mg daily.  Close follow-up with blood work recommended    He had a virtual visit with Dr. Mckeon 3/11/2024 to review his updated CT for lung nodules and amiodarone pneumonitis.  CT showed significant improvement in the multiple nodules/interstitial opacities, but not complete resolution.  PFTs showed normal spirometry/lung volumes with mildly reduced diffusion      Today's Visit:  Overall, Brayden thought things were going better over the last few weeks until about 4-5 days ago, when he noted increase in weight despite continued torsemide 80 mg daily.  It turns out he never started the spironolactone 12.5 that was communicated to him after our visit, but did cut back on his potassium dosing.    Notes continued mild lightheadedness, much improved after cutting back on losartan.    Despite improvement in PFTs, still \"horrible\" breathing, especially going up stairs.  No orthopnea/PND.  Using his CPAP daily, but notes very poor sleep as he is up all night going to the bathroom.       VITALS:  Vitals: BP (!) 127/90   Pulse 86   Ht 1.727 m (5' 7.99\")   Wt 135.5 kg (298 lb 12.8 oz)   SpO2 94%   BMI 45.45 kg/m      Diagnostic Testing:  Echocardiogram 2/11/2024 LVEF 55-60%.  Normal RV.  Moderate CHARBEL.  Mild MAC.  1+ MR.  Moderate aortic stenosis.  Ascending aortic dilatation (4.0 cm)  Limited " Echo 11/2023 LVEF 55-60%. No RWMA. Nl RV. Mild MAC with 1+MR. Mean gradient AoV was 32 mmHg and MARILYN 1.22 cm2. DVI 0.29. Root 3.2 cm. Asc aorta 4.0 cm  ZioPatch 11/24-25/2023 (stopped early d/t hospitalization) NSR 71 bpm. Occasional ectopy. No AFIb seen.  Echo 9/6/2023 showed LVEF 49%. Nl RV. Nl LA size. 1+MR. Mod AoS with mean gradient 16mmHg, Vmax 2.7 m/s, DI 0.31  Aorta 8/2023 with aneuryamsl dilation of suprarenal aorta (3.6x3.8). follow-up CTA showed no aneurysm  EKG 7/2021 showed sinus bradycardia 45 bpm.  Nonspecific ST-T wave changes  Cardiac MRI 10/2017 showed an EF of 51% with mild hypokinesis involving the basal  Component      Latest Ref Rng 2/21/2024  8:02 AM 3/1/2024  12:03 PM 3/7/2024  2:20 PM 3/21/2024  12:57 PM   Sodium      135 - 145 mmol/L 140  140  139  141    Potassium      3.4 - 5.3 mmol/L 3.5  3.6  3.6  3.1 (L)    Chloride      98 - 107 mmol/L 99  98  98  95 (L)    Carbon Dioxide (CO2)      22 - 29 mmol/L 32 (H)  30 (H)  29  36 (H)    Anion Gap      7 - 15 mmol/L 9  12  12  10    Urea Nitrogen      8.0 - 23.0 mg/dL 25.4 (H)  27.4 (H)  19.6  16.6    Creatinine      0.67 - 1.17 mg/dL 1.25 (H)  1.55 (H)  1.13  1.16    GFR Estimate      >60 mL/min/1.73m2 61  47 (L)  69  67    Calcium      8.8 - 10.2 mg/dL 8.7 (L)  8.9  8.8  8.8    Glucose      70 - 99 mg/dL 117 (H)  108 (H)  89  101 (H)           Plan:  Increase torsemide to 80 mg AM/40 mg noon  Increase potassium chloride back up to 20 mEq  BID  Start the spironolactone 12.5 mg daily as previously recommended   Limit sodium intake to 2300 mg or less/day  See me next week with BMP      Assessment/Plan:    H/o Persistent AFib  Had increasing DELEON/SOB from recurrent AFib and underwent DCCV while on amiodarone 8/2022, which was decreased 10/2022, then stopped 1/2023  Had recurrent AFib 7/2023 in setting of EtOH use. Initial DCCV 9/2023 was not successful in restoring SR and amiodarone restarted 9/2023. 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 Remained in NSR for a period of time, now back in AF    Focus has been on rate control given inability to use AAD therapy (pulmonary fibrosis with amiodarone, scar on cardiac MRI)  Has remained on anticoagulation with Xarelto for [id number] (HFpEF, HTN, age, aortic plaquing)    PLAN:  Reviewed with Brayden that atrial fibrillation is certainly not helping his shortness of breath/HFpEF, but at this time, our options for treatment are limited given pulmonary toxicity with amiodarone, and scarring on MRI.    Current plan is to continue rate control with metoprolol  Continue anticoagulation  See Dr. Day for long-term mgmt of AFib 5/2024.  Reviewed that he may recommend repeat testing to see if flecainide could be used, or could consider repeat ablation versus use of sotalol (watching lung status carefully).  Reviewed that AVN and ablation/PPM would likely not be helpful for his symptoms given his heart rate has been controlled in AF and he remains symptomatic  Updated echo before he sees Dr. Day    Recurrent HFpEF  Had this in the past with recurrent atrial fibrillation, which is why we have tried to maintain SR  Unfortunately, back in AF and not able to use amiodarone due to his pneumonitis and have had to adopt rate control strategy.  Aortic stenosis also thought to be contributing, but at this time, appears moderate per TAVR team  Switching Lasix to torsemide 80 mg daily on 2/23 seemed to improve weight/breathing/edema.  He is continued on torsemide 80 mg daily, and spironolactone was added after last visit 3/8, but he did not recognize that he was supposed to start this    Weight is stable/a little up at home    PLAN:  Increase torsemide to 80 mg AM/40 mg noon  Increase potassium chloride back up to 20 mEq  BID  Start the spironolactone 12.5 mg daily as previously recommended   Limit sodium intake to 2300 mg or  less/day  See me back next week with BMP  Updated echo before he sees Dr. Day    Pulmonary Fibrosis  Amiodarone has been used off/on given c/o increasing DELEON/HFpEF when in persistent AFib since 10/2021.  Most recently restarted after unsuccessful DCCV 9/2023   Amiodarone now STOPPED 11/2023 and added to allergy list d/t pneumonitis  Had routine follow-up with Dr. Mckeon for pulmonary nodules and pneumonitis and PFTs improved.  CT also improved.    PLAN:  Continue routine follow-up with Dr. Mckeon    HTN  Currently on torsemide 80 mg daily, metoprolol tartrate 25 mg BID, amlodipine 5 mg daily. Losartan reduced from 100 mg to 50 mg daily during visit 3/1 d/t hypotension, lightheadedness, nausea, which improved with higher BPs    PLAN:  Continue current medications, add spironolactone 12.5 as above    4. Aortic Stenosis  Mild/moderate on echo 10/2022, stable on echo 9/2023, up to at least moderate on echo while hospitalized 2/2024    PLAN:  Appointment with [facility] has been recommended but still appears to be moderate AoS per Coordinator.  Repeat echo 5/2024 before he sees Dr. Day      The longitudinal plan of care for the diagnosis(es)/condition(s) as documented were addressed during this visit. Due to the added complexity in care, I will continue to support Brayden in the subsequent management and with ongoing continuity of care.    Crystal Campos PA-C, MSPAS      Orders Placed This Encounter   Procedures    Basic metabolic panel    Follow-Up with Cardiology [facility]     Orders Placed This Encounter   Medications    spironolactone (ALDACTONE) 25 MG tablet     Sig: Take 0.5 tablets (12.5 mg) by mouth daily     Dispense:  45 tablet     Refill:  2    potassium chloride pushpa ER (KLOR-CON M20) 20 MEQ CR tablet     Sig: Take 1 tablet (20 mEq) by mouth 2 times daily    torsemide (DEMADEX) 20 MG tablet     Sig: Take 4 tablets (80 mg) by mouth daily AND 2 tablets (40 mg) daily (with lunch).     Dispense:  180 tablet      Refill:  5     Note dose increase     Medications Discontinued During This Encounter   Medication Reason    spironolactone (ALDACTONE) 25 MG tablet Reorder (No AVS)    torsemide (DEMADEX) 20 MG tablet Reorder (No AVS)    potassium chloride pushpa ER (KLOR-CON M20) 20 MEQ CR tablet Reorder (No AVS)             Encounter Diagnoses   Name Primary?    Acute on chronic congestive heart failure, unspecified heart failure type (H)     Benign essential hypertension     Hypokalemia     Essential hypertension     Persistent atrial fibrillation (H)     Lower extremity edema              CURRENT MEDICATIONS:  Current Outpatient Medications   Medication Sig Dispense Refill    allopurinol (ZYLOPRIM) 300 MG tablet Take 1 tablet (300 mg) by mouth daily 90 tablet 4    cetirizine (ZYRTEC) 10 MG tablet Take 10 mg by mouth as needed for allergies      finasteride (PROPECIA) 1 MG tablet Take 1 tablet (1 mg) by mouth daily 90 tablet 4    losartan (COZAAR) 100 MG tablet Take 0.5 tablets (50 mg) by mouth daily      metoprolol tartrate (LOPRESSOR) 25 MG tablet Take 1 tablet (25 mg) by mouth 2 times daily 180 tablet 3    potassium chloride pushpa ER (KLOR-CON M20) 20 MEQ CR tablet Take 1 tablet (20 mEq) by mouth 2 times daily      rivaroxaban ANTICOAGULANT (XARELTO ANTICOAGULANT) 20 MG TABS tablet Take 1 tablet (20 mg) by mouth daily (with dinner) 90 tablet 3    spironolactone (ALDACTONE) 25 MG tablet Take 0.5 tablets (12.5 mg) by mouth daily 45 tablet 2    torsemide (DEMADEX) 20 MG tablet Take 4 tablets (80 mg) by mouth daily AND 2 tablets (40 mg) daily (with lunch). 180 tablet 5       ALLERGIES     Allergies   Allergen Reactions    Amiodarone Shortness Of Breath     Suspected amiodarone toxicity involving lungs         Review of Systems:  Skin:  Negative     Eyes:  Positive for visual blurring  ENT:  Negative    Respiratory:  Positive for sleep apnea;dyspnea on exertion  Cardiovascular:  Negative for;palpitations;chest pain edema;Positive  "for;exercise intolerance;fatigue  Gastroenterology: Negative for melena;hematochezia  Genitourinary:  Positive for nocturia  Musculoskeletal:  Negative for muscular weakness  Neurologic:  Positive for    Psychiatric:  Negative    Heme/Lymph/Imm:  Negative    Endocrine:  Negative      Physical Exam:  Vitals: BP (!) 127/90   Pulse 86   Ht 1.727 m (5' 7.99\")   Wt 135.5 kg (298 lb 12.8 oz)   SpO2 94%   BMI 45.45 kg/m      Constitutional:  cooperative, alert and oriented, well developed, well nourished, in no acute distress        Skin:  warm and dry to the touch, no apparent skin lesions or masses noted        Head:  normocephalic, no masses or lesions        Eyes:  pupils equal and round;conjunctivae and lids unremarkable;sclera white        ENT:      Slight R mouth droopiness - [place] Palsy    Neck:    JVP elevated;hepatojugular reflux      Chest:  normal breath sounds, clear to auscultation, normal A-P diameter, normal symmetry, normal respiratory excursion, no use of accessory muscles        Cardiac:   irregularly irregular rhythm       systolic ejection murmur;grade 2        Abdomen:  abdomen soft obese      Vascular: pulses full and equal                                      Extremities and Back:  no deformities, clubbing, cyanosis, erythema observed   R LE cellulitis/wound improving 3/21/2024    Neurological:  no gross motor deficits            PAST MEDICAL HISTORY:  Past Medical History:   Diagnosis Date    (HFpEF) heart failure with preserved ejection fraction (H)     Acute gouty arthritis     Alcohol use disorder in remission     Amiodarone pulmonary toxicity     Aortic stenosis     Basal cell carcinoma     Of the nose    Bell's palsy     Herpes simplex without mention of complication     Hyperlipidemia     Hypertension 2000    Persistent atrial fibrillation (H)     persistent, DCCV 12/2017, ablation 11/6/17    Pulmonary nodules     Sleep apnea     CPAP       PAST SURGICAL HISTORY:  Past Surgical History: "   Procedure Laterality Date    ABLATION OF DYSRHYTHMIC FOCUS  2018, 04/12/2019    Procedure: EP Ablation Focal AFIB;  Surgeon: Fady Day MD;  Location:  HEART CARDIAC CATH LAB    ACHILLES TENDON SURGERY  1997    ANESTHESIA CARDIOVERSION N/A 2/26/2019    Procedure: ANESTHESIA CARDIOVERSION (CONNER);  Surgeon: GENERIC ANESTHESIA PROVIDER;  Location:  OR    ANESTHESIA CARDIOVERSION N/A 11/19/2020    Procedure: ANESTHESIA, FOR CARDIOVERSION (dr campbell);  Surgeon: GENERIC ANESTHESIA PROVIDER;  Location:  OR    ANESTHESIA CARDIOVERSION N/A 8/22/2022    Procedure: CARDIOVERSION;  Surgeon: GENERIC ANESTHESIA PROVIDER;  Location:  OR    ANESTHESIA CARDIOVERSION N/A 9/13/2023    Procedure: Anesthesia cardioversion;  Surgeon: GENERIC ANESTHESIA PROVIDER;  Location:  OR    ANESTHESIA CARDIOVERSION N/A 10/6/2023    Procedure: Anesthesia cardioversion;  Surgeon: GENERIC ANESTHESIA PROVIDER;  Location:  OR    BASAL CELL CARCINOMA EXCISION Right 2009    nose, took cartilage from inner ear.    CARDIOVERSION  2012, 2019    cardioversion for atr fib    COLONOSCOPY  2008    COLONOSCOPY N/A 3/22/2023    Procedure: COLONOSCOPY, FLEXIBLE, WITH LESION REMOVAL USING SNARE;  Surgeon: Anne Lucero MD;  Location:  GI    EP ABLATION FOCAL AFIB N/A 4/12/2019    Procedure: EP Ablation Focal AFIB;  Surgeon: Fady Day MD;  Location:  HEART CARDIAC CATH LAB    HERNIA REPAIR, UMBILICAL  08/20/2012    Procedure: HERNIORRHAPHY UMBILICAL;  UMBILICAL HERNIA REPAIR;  Surgeon: Ra Crocker MD;  Location: [facility]    HERNIORRHAPHY UMBILICAL  8/20/2012    Procedure: HERNIORRHAPHY UMBILICAL;  UMBILICAL HERNIA REPAIR;  Surgeon: Ra Crocker MD;  Location: [facility]    ORTHOPEDIC SURGERY      ORTHOPEDIC SURGERY      OTHER SURGICAL HISTORY Right 03/2017    total KNEE ARTHROSCOPY     TOTAL KNEE ARTHROPLASTY Left 03/16/2018    ZZC NONSPECIFIC PROCEDURE  1997    achilles tendon    ZZC NONSPECIFIC PROCEDURE      knees,  arthroscopy    ZZC NONSPECIFIC PROCEDURE      basal cell skin ca, nose    ZZC NONSPECIFIC PROCEDURE      flex sig; colonoscopy due 3/2008    ZZC NONSPECIFIC PROCEDURE  2012    cardioversion for atr fib    ZZC TOTAL KNEE ARTHROPLASTY Left 2018    Dr. Malick Suarez       FAMILY HISTORY:  Family History   Problem Relation Age of Onset    Family History Negative Mother     Heart Disease Father         decesased at 42 - MI    Heart Disease Sister          MI    Lipids Sister     Lipids Sister     No Known Problems Sister     Heart Disease Brother          MI    Heart Disease Brother         CABG AT 49    Lung Cancer No family hx of        SOCIAL HISTORY:  Social History     Socioeconomic History    Marital status: Single     Spouse name: None    Number of children: None    Years of education: None    Highest education level: None   Tobacco Use    Smoking status: Former     Packs/day: 1.00     Years: 10.00     Additional pack years: 0.00     Total pack years: 10.00     Types: Cigarettes     Start date:      Quit date: 2011     Years since quittin.5     Passive exposure: Past    Smokeless tobacco: Never    Tobacco comments:     quit    Vaping Use    Vaping Use: Never used   Substance and Sexual Activity    Alcohol use: Not Currently    Drug use: Never    Sexual activity: Not Currently     Partners: Female     Birth control/protection: None   Social History Narrative    Worked in retail () and insurance sales     Spends some time on [place] on [place]       Thank you for allowing me to participate in the care of your patient.      Sincerely,     Natalie Campos PA-C     [facility]  cc:   No referring provider defined for this encounter.

## 2024-03-21 NOTE — PATIENT INSTRUCTIONS
Brayden - it was nice to see you today!    Today we reviewed:    Labs showed kidneys still look great. Potassium is a little low  Likely as we reduced the potassium thinking you'd be on the spironolactone     2.   Breathing is still terrible and weight is up  3. Bad sleep d/t urination    MEDICATION CHANGES:    Start the spironolactone 12.5 mg daily (1/2 of a 25 mg) - new Rx   INCREASE torsemide to 80 mg in AM and 40 mg at noon  INCREASE potassium again to 20 mEq TWICE a day    RECOMMENDATIONS:    LIMIT SODIUM DIET <2300 mg/day to help reduce swelling  For nighttime peeing, stop drinking ~9 PM and put feet up 1-2hours before you lay down!  Get updated echo before you see Dr. Nicole    FOLLOW UP:    See me next week with blood work    IMPORTANT PHONE NUMBERS FOR [facility] ([place]):  Arrhythmia nurses Mervat Hidalgo & Caitlin: [phone]  Device nurses: [phone]

## 2024-03-27 NOTE — PROGRESS NOTES
[facility]    I had the pleasure of seeing Brayden when he came for follow up of AFib and HF.  This 73 year old sees Dr. Day for his history of:    1. H/o persistent AFib. EF 50-55% 1/2022 - first dx'd in 2012. Failed flecainide (and noted scar on MRI). Underwent PVI and SVC isolation 11/2017. Repeat PVI and CTI for inducible AFlutter 4/2019. Started on Amio with DCCV 11/19/2020 for recurrent AFib in setting of increased alcohol use.   Amiodarone decreased 1/2021; back in persistent AFib 12/2021 and amiodarone stopped as felt asx'c.  This was restarted 8/2022 prior to DCCV 8/22/2022. Amiodarone stopped again 1/2023. AFib restarted 7/2023 after EtOH use, now s/p DCCV (unsuccessful 9/13), then loaded with amiodarone s/p successful DCCV 10/6/2023.  Dr. Day recommended repeat AFib ablation if he started having AFib without it being triggered by EtOH. Amiodarone stopped 11/2023 d/t discovery of pneumonitis.  Most recent CT and PFTs 3/2024 showed improvement/resolution  2. Moderate Aortic Stenosis   3. Benign Essential HTN   4. BOWEN  - on CPAP  5. Myocardiac Scar noted on cardiac MRI 10/2017, consistent with prior MI in the left Cx territory. EF 51%.  Avoiding flecainide use  6. H/o Alcohol dependence - no EtOH since 2017, then again 3-4 months in early 2022 after he was noted to be back in AFib.Back in AFib with EtOH use 7/2023  7. Chronic AC  On Xarelto for CHADSVASc 4 (HFpEF, HTN, age, aortic plaquing)  8. Bell's Palsy  9. Lung nodules- noted on CTA to follow-up on aorta 8/2023.  Last CT 3/2024 showed improvement but not complete resolution of pulmonary nodules on CT.  Sees Dr. Mckeon  10. HFpEF - hospitalized 2/2024 for recurrent HFpEF in setting of persistent AF, prednisone use, moderate aortic stenosis.       Last Visit & Interval History:  I have been seeing Brayden very frequently for his HFpEF, adjusting diuretic therapy as he continues to deal with being on prednisone for pneumonitis, aortic  "stenosis (thought moderate) and recurrent persistent AF.  He was on torsemide, and when I saw him 3/7 (2 weeks ago) he was feeling a bit better after reducing losartan (for symptomatic hypotension), weaning off of prednisone (every other day dosing), and diureses with torsemide    He had a virtual visit with Dr. Mckeon 3/11/2024 to review his updated CT for lung nodules and amiodarone pneumonitis.  CT showed significant improvement in the multiple nodules/interstitial opacities, but not complete resolution.  PFTs showed normal spirometry/lung volumes with mildly reduced diffusion    At his last OV 3/21 he continue to c/o \"horrible\" breathing, especially going up stairs.  I recommended he start spironolactone 12.5 (had not started as intended after our last visit), restart KCL at 20 mg mEq BID, increase torsemide to 80/40, limit sodium intake and elevate legs more to help with swelling.      Today's Visit:  Edema much better on lower sodium diet but otherwise doesn't think that the increase in torsemide and addition of spironolactone has improved his DELEON, especially going up stairs, or weight.    Really watching the sodium intake.    Denies chest pain, pressure, tightness.  No dizziness, lightheadedness, syncope.      His O2 sats are now dropping only to 92% with exercise, which is an improvement!         VITALS:  Vitals: /64   Pulse 88   Ht 1.702 m (5' 7\")   Wt 135.2 kg (298 lb)   SpO2 93%   BMI 46.67 kg/m      Diagnostic Testing:  Echocardiogram 2/11/2024 LVEF 55-60%.  Normal RV.  Moderate CHARBEL.  Mild MAC.  1+ MR.  Moderate aortic stenosis.  Ascending aortic dilatation (4.0 cm)  Limited Echo 11/2023 LVEF 55-60%. No RWMA. Nl RV. Mild MAC with 1+MR. Mean gradient AoV was 32 mmHg and MARILYN 1.22 cm2. DVI 0.29. Root 3.2 cm. Asc aorta 4.0 cm  ZioPatch 11/24-25/2023 (stopped early d/t hospitalization) NSR 71 bpm. Occasional ectopy. No AFIb seen.  Echo 9/6/2023 showed LVEF 49%. Nl RV. Nl LA size. 1+MR. Mod AoS " with mean gradient 16mmHg, Vmax 2.7 m/s, DI 0.31  Aorta 8/2023 with aneuryamsl dilation of suprarenal aorta (3.6x3.8). follow-up CTA showed no aneurysm  EKG 7/2021 showed sinus bradycardia 45 bpm.  Nonspecific ST-T wave changes  Cardiac MRI 10/2017 showed an EF of 51% with mild hypokinesis involving the basal  Component      Latest Ref Rng 2/21/2024  8:02 AM 3/1/2024  12:03 PM 3/7/2024  2:20 PM 3/21/2024  12:57 PM 3/28/2024  9:00 AM   Sodium      135 - 145 mmol/L 140  140  139  141  142    Potassium      3.4 - 5.3 mmol/L 3.5  3.6  3.6  3.1 (L)  3.3 (L)    Chloride      98 - 107 mmol/L 99  98  98  95 (L)  100    Carbon Dioxide (CO2)      22 - 29 mmol/L 32 (H)  30 (H)  29  36 (H)  29    Anion Gap      7 - 15 mmol/L 9  12  12  10  13    Urea Nitrogen      8.0 - 23.0 mg/dL 25.4 (H)  27.4 (H)  19.6  16.6  15.1    Creatinine      0.67 - 1.17 mg/dL 1.25 (H)  1.55 (H)  1.13  1.16  1.25 (H)    GFR Estimate      >60 mL/min/1.73m2 61  47 (L)  69  67  61    Calcium      8.8 - 10.2 mg/dL 8.7 (L)  8.9  8.8  8.8  8.3 (L)    Glucose      70 - 99 mg/dL 117 (H)  108 (H)  89  101 (H)  126 (H)         Plan:    Stop spironolactone   Increase KCl to 40 mEq twice daily  BMP in 2 weeks - we'll review via MyChart      Assessment/Plan:    H/o Persistent AFib  Had increasing DELEON/SOB from recurrent AFib and underwent DCCV while on amiodarone 8/2022, which was decreased 10/2022, then stopped 1/2023  Had recurrent AFib 7/2023 in setting of EtOH use. Initial DCCV 9/2023 was not successful in restoring SR and amiodarone restarted 9/2023. 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 Remained in NSR for a period of time, now back in AF    Focus has been on rate control given inability to use AAD therapy (pulmonary fibrosis with amiodarone, scar on cardiac MRI)  Has remained on anticoagulation with Xarelto for [id number] 4 (HFpEF, HTN, age, aortic  alma)    PLAN:  Reviewed with Brayden that atrial fibrillation is certainly not helping his shortness of breath/HFpEF, but at this time, our options for treatment are limited given pulmonary toxicity with amiodarone, and scarring on MRI.    Current plan is to continue rate control with metoprolol  Continue anticoagulation  See Dr. Day for long-term mgmt of AFib 5/2024.  Reviewed that he may recommend repeat testing to see if flecainide could be used, or could consider repeat ablation versus use of sotalol (watching lung status carefully).  Reviewed that AVN and ablation/PPM would likely not be helpful for his symptoms given his heart rate has been controlled in AF and he remains symptomatic  Updated echo before he sees Dr. Day is scheduled      Recurrent HFpEF  Had this in the past with recurrent atrial fibrillation, which is why we have tried to maintain SR  Unfortunately, back in AF and not able to use amiodarone due to his pneumonitis and have had to adopt rate control strategy.  Aortic stenosis also thought to be contributing, but at this time, appears moderate per TAVR team  Switching Lasix to torsemide 80 mg daily on 2/23 seemed to improve weight/breathing/edema.  Increased torsemide and initiation of spironolactone did not seem to improve breathing or weight, but edema is much improved probably likely due to this and watching the salt in his diet.  BMP, as above, shows bump in creatinine and hypokalemia      PLAN:  Stop spironolactone given bump in creatinine  Continue torsemide 80 in AM/40 PM  BMP in 2 weeks.  Will review via MyChart with update on edema, breathing  Increase KCl to 40 mEq BID      Pulmonary Fibrosis  Amiodarone has been used off/on given c/o increasing DELEON/HFpEF when in persistent AFib since 10/2021.  Most recently restarted after unsuccessful DCCV 9/2023   Amiodarone now STOPPED 11/2023 and added to allergy list d/t pneumonitis  Had routine follow-up with Dr. Mckeon for pulmonary  nodules and pneumonitis and PFTs improved.  CT also improved.    PLAN:  Continue routine follow-up with Dr. Mckeon    HTN  Currently on torsemide 80 mg daily, metoprolol tartrate 25 mg BID, amlodipine 5 mg daily. Losartan reduced from 100 mg to 50 mg daily during visit 3/1 d/t hypotension, lightheadedness, nausea, which improved with higher BPs    PLAN:  Continue current medications, add spironolactone 12.5 as above    4. Aortic Stenosis  Mild/moderate on echo 10/2022, stable on echo 9/2023, up to at least moderate on echo while hospitalized 2/2024    PLAN:  Appointment with [facility] has been recommended but still appears to be moderate AoS per Coordinator.  Repeat echo 5/2024 before he sees Dr. Day      The longitudinal plan of care for the diagnosis(es)/condition(s) as documented were addressed during this visit. Due to the added complexity in care, I will continue to support Brayden in the subsequent management and with ongoing continuity of care.    Crystal Campos PA-C, MSPAS      Orders Placed This Encounter   Procedures    Basic metabolic panel     Orders Placed This Encounter   Medications    potassium chloride pushpa ER (KLOR-CON M20) 20 MEQ CR tablet     Sig: Take 2 tablets (40 mEq) by mouth 2 times daily     Dispense:  120 tablet     Refill:  4     Note dose increase     Medications Discontinued During This Encounter   Medication Reason    spironolactone (ALDACTONE) 25 MG tablet     potassium chloride pushpa ER (KLOR-CON M20) 20 MEQ CR tablet Reorder (No AVS)               Encounter Diagnoses   Name Primary?    Acute on chronic congestive heart failure, unspecified heart failure type (H)     Benign essential hypertension     Hypokalemia                CURRENT MEDICATIONS:  Current Outpatient Medications   Medication Sig Dispense Refill    allopurinol (ZYLOPRIM) 300 MG tablet Take 1 tablet (300 mg) by mouth daily 90 tablet 4    cetirizine (ZYRTEC) 10 MG tablet Take 10 mg by mouth as needed for  "allergies      finasteride (PROPECIA) 1 MG tablet Take 1 tablet (1 mg) by mouth daily 90 tablet 4    losartan (COZAAR) 100 MG tablet Take 0.5 tablets (50 mg) by mouth daily      metoprolol tartrate (LOPRESSOR) 25 MG tablet Take 1 tablet (25 mg) by mouth 2 times daily 180 tablet 3    potassium chloride pushpa ER (KLOR-CON M20) 20 MEQ CR tablet Take 2 tablets (40 mEq) by mouth 2 times daily 120 tablet 4    rivaroxaban ANTICOAGULANT (XARELTO ANTICOAGULANT) 20 MG TABS tablet Take 1 tablet (20 mg) by mouth daily (with dinner) 90 tablet 3    torsemide (DEMADEX) 20 MG tablet Take 4 tablets (80 mg) by mouth daily AND 2 tablets (40 mg) daily (with lunch). 180 tablet 5       ALLERGIES     Allergies   Allergen Reactions    Amiodarone Shortness Of Breath     Suspected amiodarone toxicity involving lungs         Review of Systems:  Skin:        Eyes:       ENT:       Respiratory:       Cardiovascular:       Gastroenterology:      Genitourinary:       Musculoskeletal:       Neurologic:       Psychiatric:       Heme/Lymph/Imm:       Endocrine:         Physical Exam:  Vitals: /64   Pulse 88   Ht 1.702 m (5' 7\")   Wt 135.2 kg (298 lb)   SpO2 93%   BMI 46.67 kg/m      Constitutional:           Skin:           Head:           Eyes:           ENT:           Neck:           Chest:           Cardiac:                    Abdomen:           Vascular:                                        Extremities and Back:           Neurological:               PAST MEDICAL HISTORY:  Past Medical History:   Diagnosis Date    (HFpEF) heart failure with preserved ejection fraction (H)     Acute gouty arthritis     Alcohol use disorder in remission     Amiodarone pulmonary toxicity     Aortic stenosis     Basal cell carcinoma     Of the nose    Bell's palsy     Herpes simplex without mention of complication     Hyperlipidemia     Hypertension 2000    Persistent atrial fibrillation (H)     persistent, DCCV 12/2017, ablation 11/6/17    Pulmonary " nodules     Sleep apnea     CPAP       PAST SURGICAL HISTORY:  Past Surgical History:   Procedure Laterality Date    ABLATION OF DYSRHYTHMIC FOCUS  2018, 04/12/2019    Procedure: EP Ablation Focal AFIB;  Surgeon: Fady Day MD;  Location:  [facility]ARD[facility] CATH LAB    ACHILLES TENDON SURGERY  1997    ANESTHESIA CARDIOVERSION N/A 2/26/2019    Procedure: ANESTHESIA CARDIOVERSION (CONNER);  Surgeon: GENERIC ANESTHESIA PROVIDER;  Location:  OR    ANESTHESIA CARDIOVERSION N/A 11/19/2020    Procedure: ANESTHESIA, FOR CARDIOVERSION (dr campbell);  Surgeon: GENERIC ANESTHESIA PROVIDER;  Location:  OR    ANESTHESIA CARDIOVERSION N/A 8/22/2022    Procedure: CARDIOVERSION;  Surgeon: GENERIC ANESTHESIA PROVIDER;  Location:  OR    ANESTHESIA CARDIOVERSION N/A 9/13/2023    Procedure: Anesthesia cardioversion;  Surgeon: GENERIC ANESTHESIA PROVIDER;  Location:  OR    ANESTHESIA CARDIOVERSION N/A 10/6/2023    Procedure: Anesthesia cardioversion;  Surgeon: GENERIC ANESTHESIA PROVIDER;  Location:  OR    BASAL CELL CARCINOMA EXCISION Right 2009    nose, took cartilage from inner ear.    CARDIOVERSION  2012, 2019    cardioversion for atr fib    COLONOSCOPY  2008    COLONOSCOPY N/A 3/22/2023    Procedure: COLONOSCOPY, FLEXIBLE, WITH LESION REMOVAL USING SNARE;  Surgeon: Anne Lucero MD;  Location:  GI    EP ABLATION FOCAL AFIB N/A 4/12/2019    Procedure: EP Ablation Focal AFIB;  Surgeon: Fady Day MD;  Location:  HEART CARD[facility] CATH LAB    HERNIA REPAIR, UMBILICAL  08/20/2012    Procedure: HERNIORRHAPHY UMBILICAL;  UMBILICAL HERNIA REPAIR;  Surgeon: Ra Crocker MD;  Location: [facility]    HERNIORRHAPHY UMBILICAL  8/20/2012    Procedure: HERNIORRHAPHY UMBILICAL;  UMBILICAL HERNIA REPAIR;  Surgeon: Ra Crocker MD;  Location: [facility]    ORTHOPEDIC SURGERY      ORTHOPEDIC SURGERY      OTHER SURGICAL HISTORY Right 03/2017    total KNEE ARTHROSCOPY     TOTAL KNEE ARTHROPLASTY Left 03/16/2018    [facility]  NONSPECIFIC PROCEDURE      achilles tendon    [facility] NONSPECIFIC PROCEDURE      knees, arthroscopy    [facility] NONSPECIFIC PROCEDURE      basal cell skin ca, nose    Z NONSPECIFIC PROCEDURE      flex sig; colonoscopy due 3/2008    Z NONSPECIFIC PROCEDURE      cardioversion for atr fib    [facility] TOTAL KNEE ARTHROPLASTY Left 2018    Dr. Malick Suarez       FAMILY HISTORY:  Family History   Problem Relation Age of Onset    Family History Negative Mother     Heart Disease Father         decesased at 42 - MI    Heart Disease Sister          MI    Lipids Sister     Lipids Sister     No Known Problems Sister     Heart Disease Brother          MI    Heart Disease Brother         CABG AT 49    Lung Cancer No family hx of        SOCIAL HISTORY:  Social History     Socioeconomic History    Marital status: Single   Tobacco Use    Smoking status: Former     Packs/day: 1.00     Years: 10.00     Additional pack years: 0.00     Total pack years: 10.00     Types: Cigarettes     Start date:      Quit date: 2011     Years since quittin.6     Passive exposure: Past    Smokeless tobacco: Never    Tobacco comments:     quit    Vaping Use    Vaping Use: Never used   Substance and Sexual Activity    Alcohol use: Not Currently    Drug use: Never    Sexual activity: Not Currently     Partners: Female     Birth control/protection: None   Social History Narrative    Worked in retail (-) and insurance sales     Spends some time on [place] on [place]

## 2024-03-28 ENCOUNTER — LAB (OUTPATIENT)
Dept: LAB | Facility: CLINIC | Age: 74
End: 2024-03-28
Payer: COMMERCIAL

## 2024-03-28 DIAGNOSIS — I50.9 ACUTE ON CHRONIC CONGESTIVE HEART FAILURE, UNSPECIFIED HEART FAILURE TYPE (H): ICD-10-CM

## 2024-03-28 DIAGNOSIS — I10 BENIGN ESSENTIAL HYPERTENSION: ICD-10-CM

## 2024-03-28 LAB
ANION GAP SERPL CALCULATED.3IONS-SCNC: 13 MMOL/L (ref 7–15)
BUN SERPL-MCNC: 15.1 MG/DL (ref 8–23)
CALCIUM SERPL-MCNC: 8.3 MG/DL (ref 8.8–10.2)
CHLORIDE SERPL-SCNC: 100 MMOL/L (ref 98–107)
CREAT SERPL-MCNC: 1.25 MG/DL (ref 0.67–1.17)
DEPRECATED HCO3 PLAS-SCNC: 29 MMOL/L (ref 22–29)
EGFRCR SERPLBLD CKD-EPI 2021: 61 ML/MIN/1.73M2
GLUCOSE SERPL-MCNC: 126 MG/DL (ref 70–99)
POTASSIUM SERPL-SCNC: 3.3 MMOL/L (ref 3.4–5.3)
SODIUM SERPL-SCNC: 142 MMOL/L (ref 135–145)

## 2024-03-28 PROCEDURE — 80048 BASIC METABOLIC PNL TOTAL CA: CPT

## 2024-03-28 PROCEDURE — 36415 COLL VENOUS BLD VENIPUNCTURE: CPT

## 2024-03-29 ENCOUNTER — OFFICE VISIT (OUTPATIENT)
Dept: CARDIOLOGY | Facility: CLINIC | Age: 74
End: 2024-03-29
Payer: COMMERCIAL

## 2024-03-29 VITALS
OXYGEN SATURATION: 93 % | HEIGHT: 67 IN | HEART RATE: 88 BPM | SYSTOLIC BLOOD PRESSURE: 116 MMHG | WEIGHT: 298 LBS | DIASTOLIC BLOOD PRESSURE: 64 MMHG | BODY MASS INDEX: 46.77 KG/M2

## 2024-03-29 DIAGNOSIS — I50.9 ACUTE ON CHRONIC CONGESTIVE HEART FAILURE, UNSPECIFIED HEART FAILURE TYPE (H): ICD-10-CM

## 2024-03-29 DIAGNOSIS — E87.6 HYPOKALEMIA: ICD-10-CM

## 2024-03-29 DIAGNOSIS — I10 BENIGN ESSENTIAL HYPERTENSION: ICD-10-CM

## 2024-03-29 PROCEDURE — G2211 COMPLEX E/M VISIT ADD ON: HCPCS | Performed by: PHYSICIAN ASSISTANT

## 2024-03-29 PROCEDURE — 99214 OFFICE O/P EST MOD 30 MIN: CPT | Performed by: PHYSICIAN ASSISTANT

## 2024-03-29 RX ORDER — POTASSIUM CHLORIDE 1500 MG/1
40 TABLET, EXTENDED RELEASE ORAL 2 TIMES DAILY
Qty: 120 TABLET | Refills: 4 | Status: SHIPPED | OUTPATIENT
Start: 2024-03-29 | End: 2024-05-28

## 2024-03-29 NOTE — PATIENT INSTRUCTIONS
Brayden - it was nice to see you today!    Today we reviewed:    Weight and breathing hasn't changed but legs are looking less swollen!  Labs with low potassium and slight bump in Creatinine    MEDICATION CHANGES:    STOP spironolactone  Continue torsemide 80 mg in AM and 40 mg in noon  INCREASE potassium to 2 tabs twice a day (40 mEq)    RECOMMENDATIONS:    Continue low salt diet and putting feet up whenever possible    FOLLOW UP:    BMP in 2 weeks  - you set up, I've ordered  When I get results we'll send [employer] message with update on:  Swelling  Breathing/O2 sats      IMPORTANT PHONE NUMBERS FOR  [facility] ([place]):  Arrhythmia nurses Gwen, Mervat, & Caitlin: [phone]

## 2024-03-29 NOTE — LETTER
3/29/2024    Ranjan Courtney MD  [address] [facility] [address]    RE: Mike Schultz       Dear Colleague,     I had the pleasure of seeing Mike Schultz in the [facility].  [facility]    I had the pleasure of seeing Brayden when he came for follow up of AFib and HF.  This 73 year old sees Dr. Day for his history of:    1. H/o persistent AFib. EF 50-55% 1/2022 - first dx'd in 2012. Failed flecainide (and noted scar on MRI). Underwent PVI and SVC isolation 11/2017. Repeat PVI and CTI for inducible AFlutter 4/2019. Started on Amio with DCCV 11/19/2020 for recurrent AFib in setting of increased alcohol use.   Amiodarone decreased 1/2021; back in persistent AFib 12/2021 and amiodarone stopped as felt asx'c.  This was restarted 8/2022 prior to DCCV 8/22/2022. Amiodarone stopped again 1/2023. AFib restarted 7/2023 after EtOH use, now s/p DCCV (unsuccessful 9/13), then loaded with amiodarone s/p successful DCCV 10/6/2023.  Dr. Day recommended repeat AFib ablation if he started having AFib without it being triggered by EtOH. Amiodarone stopped 11/2023 d/t discovery of pneumonitis.  Most recent CT and PFTs 3/2024 showed improvement/resolution  2. Moderate Aortic Stenosis   3. Benign Essential HTN   4. BOWEN  - on CPAP  5. Myocardiac Scar noted on cardiac MRI 10/2017, consistent with prior MI in the left Cx territory. EF 51%.  Avoiding flecainide use  6. H/o Alcohol dependence - no EtOH since 2017, then again 3-4 months in early 2022 after he was noted to be back in AFib.Back in AFib with EtOH use 7/2023  7. Chronic AC  On Xarelto for CHADSVASc 4 (HFpEF, HTN, age, aortic plaquing)  8. Bell's Palsy  9. Lung nodules- noted on CTA to follow-up on aorta 8/2023.  Last CT 3/2024 showed improvement but not complete resolution of pulmonary nodules on CT.  Sees Dr. Mckeon  10. HFpEF - hospitalized 2/2024 for recurrent HFpEF in setting of persistent AF, prednisone use, moderate aortic  "stenosis.       Last Visit & Interval History:  I have been seeing Brayden very frequently for his HFpEF, adjusting diuretic therapy as he continues to deal with being on prednisone for pneumonitis, aortic stenosis (thought moderate) and recurrent persistent AF.  He was on torsemide, and when I saw him 3/7 (2 weeks ago) he was feeling a bit better after reducing losartan (for symptomatic hypotension), weaning off of prednisone (every other day dosing), and diureses with torsemide    He had a virtual visit with Dr. Mckeon 3/11/2024 to review his updated CT for lung nodules and amiodarone pneumonitis.  CT showed significant improvement in the multiple nodules/interstitial opacities, but not complete resolution.  PFTs showed normal spirometry/lung volumes with mildly reduced diffusion    At his last OV 3/21 he continue to c/o \"horrible\" breathing, especially going up stairs.  I recommended he start spironolactone 12.5 (had not started as intended after our last visit), restart KCL at 20 mg mEq BID, increase torsemide to 80/40, limit sodium intake and elevate legs more to help with swelling.      Today's Visit:  Edema much better on lower sodium diet but otherwise doesn't think that the increase in torsemide and addition of spironolactone has improved his DEELON, especially going up stairs, or weight.    Really watching the sodium intake.    Denies chest pain, pressure, tightness.  No dizziness, lightheadedness, syncope.      His O2 sats are now dropping only to 92% with exercise, which is an improvement!         VITALS:  Vitals: /64   Pulse 88   Ht 1.702 m (5' 7\")   Wt 135.2 kg (298 lb)   SpO2 93%   BMI 46.67 kg/m      Diagnostic Testing:  Echocardiogram 2/11/2024 LVEF 55-60%.  Normal RV.  Moderate CHARBEL.  Mild MAC.  1+ MR.  Moderate aortic stenosis.  Ascending aortic dilatation (4.0 cm)  Limited Echo 11/2023 LVEF 55-60%. No RWMA. Nl RV. Mild MAC with 1+MR. Mean gradient AoV was 32 mmHg and MARILYN 1.22 cm2. DVI 0.29. " Root 3.2 cm. Asc aorta 4.0 cm  ZioPatch 11/24-25/2023 (stopped early d/t hospitalization) NSR 71 bpm. Occasional ectopy. No AFIb seen.  Echo 9/6/2023 showed LVEF 49%. Nl RV. Nl LA size. 1+MR. Mod AoS with mean gradient 16mmHg, Vmax 2.7 m/s, DI 0.31  Aorta 8/2023 with aneuryamsl dilation of suprarenal aorta (3.6x3.8). follow-up CTA showed no aneurysm  EKG 7/2021 showed sinus bradycardia 45 bpm.  Nonspecific ST-T wave changes  Cardiac MRI 10/2017 showed an EF of 51% with mild hypokinesis involving the basal  Component      Latest Ref Rng 2/21/2024  8:02 AM 3/1/2024  12:03 PM 3/7/2024  2:20 PM 3/21/2024  12:57 PM 3/28/2024  9:00 AM   Sodium      135 - 145 mmol/L 140  140  139  141  142    Potassium      3.4 - 5.3 mmol/L 3.5  3.6  3.6  3.1 (L)  3.3 (L)    Chloride      98 - 107 mmol/L 99  98  98  95 (L)  100    Carbon Dioxide (CO2)      22 - 29 mmol/L 32 (H)  30 (H)  29  36 (H)  29    Anion Gap      7 - 15 mmol/L 9  12  12  10  13    Urea Nitrogen      8.0 - 23.0 mg/dL 25.4 (H)  27.4 (H)  19.6  16.6  15.1    Creatinine      0.67 - 1.17 mg/dL 1.25 (H)  1.55 (H)  1.13  1.16  1.25 (H)    GFR Estimate      >60 mL/min/1.73m2 61  47 (L)  69  67  61    Calcium      8.8 - 10.2 mg/dL 8.7 (L)  8.9  8.8  8.8  8.3 (L)    Glucose      70 - 99 mg/dL 117 (H)  108 (H)  89  101 (H)  126 (H)         Plan:    Stop spironolactone   Increase KCl to 40 mEq twice daily  BMP in 2 weeks - we'll review via MyChart      Assessment/Plan:    H/o Persistent AFib  Had increasing DELEON/SOB from recurrent AFib and underwent DCCV while on amiodarone 8/2022, which was decreased 10/2022, then stopped 1/2023  Had recurrent AFib 7/2023 in setting of EtOH use. Initial DCCV 9/2023 was not successful in restoring SR and amiodarone restarted 9/2023. 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 Remained in NSR for a period of time, now back in AF    Focus has been on  rate control given inability to use AAD therapy (pulmonary fibrosis with amiodarone, scar on cardiac MRI)  Has remained on anticoagulation with Xarelto for [id number] 4 (HFpEF, HTN, age, aortic plaquing)    PLAN:  Reviewed with Brayden that atrial fibrillation is certainly not helping his shortness of breath/HFpEF, but at this time, our options for treatment are limited given pulmonary toxicity with amiodarone, and scarring on MRI.    Current plan is to continue rate control with metoprolol  Continue anticoagulation  See Dr. Day for long-term mgmt of AFib 5/2024.  Reviewed that he may recommend repeat testing to see if flecainide could be used, or could consider repeat ablation versus use of sotalol (watching lung status carefully).  Reviewed that AVN and ablation/PPM would likely not be helpful for his symptoms given his heart rate has been controlled in AF and he remains symptomatic  Updated echo before he sees Dr. Day is scheduled      Recurrent HFpEF  Had this in the past with recurrent atrial fibrillation, which is why we have tried to maintain SR  Unfortunately, back in AF and not able to use amiodarone due to his pneumonitis and have had to adopt rate control strategy.  Aortic stenosis also thought to be contributing, but at this time, appears moderate per TAVR team  Switching Lasix to torsemide 80 mg daily on 2/23 seemed to improve weight/breathing/edema.  Increased torsemide and initiation of spironolactone did not seem to improve breathing or weight, but edema is much improved probably likely due to this and watching the salt in his diet.  BMP, as above, shows bump in creatinine and hypokalemia      PLAN:  Stop spironolactone given bump in creatinine  Continue torsemide 80 in AM/40 PM  BMP in 2 weeks.  Will review via [facility] with update on edema, breathing  Increase KCl to 40 mEq BID      Pulmonary Fibrosis  Amiodarone has been used off/on given c/o increasing DELEON/HFpEF when in persistent AFib since  10/2021.  Most recently restarted after unsuccessful DCCV 9/2023   Amiodarone now STOPPED 11/2023 and added to allergy list d/t pneumonitis  Had routine follow-up with Dr. Mckeon for pulmonary nodules and pneumonitis and PFTs improved.  CT also improved.    PLAN:  Continue routine follow-up with Dr. Mckeon    HTN  Currently on torsemide 80 mg daily, metoprolol tartrate 25 mg BID, amlodipine 5 mg daily. Losartan reduced from 100 mg to 50 mg daily during visit 3/1 d/t hypotension, lightheadedness, nausea, which improved with higher BPs    PLAN:  Continue current medications, add spironolactone 12.5 as above    4. Aortic Stenosis  Mild/moderate on echo 10/2022, stable on echo 9/2023, up to at least moderate on echo while hospitalized 2/2024    PLAN:  Appointment with [facility] has been recommended but still appears to be moderate AoS per Coordinator.  Repeat echo 5/2024 before he sees Dr. Day      The longitudinal plan of care for the diagnosis(es)/condition(s) as documented were addressed during this visit. Due to the added complexity in care, I will continue to support Brayden in the subsequent management and with ongoing continuity of care.    Crystal Campos PA-C, MSPAS      Orders Placed This Encounter   Procedures    Basic metabolic panel     Orders Placed This Encounter   Medications    potassium chloride pushpa ER (KLOR-CON M20) 20 MEQ CR tablet     Sig: Take 2 tablets (40 mEq) by mouth 2 times daily     Dispense:  120 tablet     Refill:  4     Note dose increase     Medications Discontinued During This Encounter   Medication Reason    spironolactone (ALDACTONE) 25 MG tablet     potassium chloride pushpa ER (KLOR-CON M20) 20 MEQ CR tablet Reorder (No AVS)               Encounter Diagnoses   Name Primary?    Acute on chronic congestive heart failure, unspecified heart failure type (H)     Benign essential hypertension     Hypokalemia                CURRENT MEDICATIONS:  Current Outpatient Medications  "  Medication Sig Dispense Refill    allopurinol (ZYLOPRIM) 300 MG tablet Take 1 tablet (300 mg) by mouth daily 90 tablet 4    cetirizine (ZYRTEC) 10 MG tablet Take 10 mg by mouth as needed for allergies      finasteride (PROPECIA) 1 MG tablet Take 1 tablet (1 mg) by mouth daily 90 tablet 4    losartan (COZAAR) 100 MG tablet Take 0.5 tablets (50 mg) by mouth daily      metoprolol tartrate (LOPRESSOR) 25 MG tablet Take 1 tablet (25 mg) by mouth 2 times daily 180 tablet 3    potassium chloride pushpa ER (KLOR-CON M20) 20 MEQ CR tablet Take 2 tablets (40 mEq) by mouth 2 times daily 120 tablet 4    rivaroxaban ANTICOAGULANT (XARELTO ANTICOAGULANT) 20 MG TABS tablet Take 1 tablet (20 mg) by mouth daily (with dinner) 90 tablet 3    torsemide (DEMADEX) 20 MG tablet Take 4 tablets (80 mg) by mouth daily AND 2 tablets (40 mg) daily (with lunch). 180 tablet 5       ALLERGIES     Allergies   Allergen Reactions    Amiodarone Shortness Of Breath     Suspected amiodarone toxicity involving lungs         Review of Systems:  Skin:        Eyes:       ENT:       Respiratory:       Cardiovascular:       Gastroenterology:      Genitourinary:       Musculoskeletal:       Neurologic:       Psychiatric:       Heme/Lymph/Imm:       Endocrine:         Physical Exam:  Vitals: /64   Pulse 88   Ht 1.702 m (5' 7\")   Wt 135.2 kg (298 lb)   SpO2 93%   BMI 46.67 kg/m      Constitutional:           Skin:           Head:           Eyes:           ENT:           Neck:           Chest:           Cardiac:                    Abdomen:           Vascular:                                        Extremities and Back:           Neurological:               PAST MEDICAL HISTORY:  Past Medical History:   Diagnosis Date    (HFpEF) heart failure with preserved ejection fraction (H)     Acute gouty arthritis     Alcohol use disorder in remission     Amiodarone pulmonary toxicity     Aortic stenosis     Basal cell carcinoma     Of the nose    Bell's palsy  "    Herpes simplex without mention of complication     Hyperlipidemia     Hypertension 2000    Persistent atrial fibrillation (H)     persistent, DCCV 12/2017, ablation 11/6/17    Pulmonary nodules     Sleep apnea     CPAP       PAST SURGICAL HISTORY:  Past Surgical History:   Procedure Laterality Date    ABLATION OF DYSRHYTHMIC FOCUS  2018, 04/12/2019    Procedure: EP Ablation Focal AFIB;  Surgeon: Fady Day MD;  Location:  HEART CARDIAC CATH LAB    ACHILLES TENDON SURGERY  1997    ANESTHESIA CARDIOVERSION N/A 2/26/2019    Procedure: ANESTHESIA CARDIOVERSION (CONNER);  Surgeon: GENERIC ANESTHESIA PROVIDER;  Location:  OR    ANESTHESIA CARDIOVERSION N/A 11/19/2020    Procedure: ANESTHESIA, FOR CARDIOVERSION (dr campbell);  Surgeon: GENERIC ANESTHESIA PROVIDER;  Location:  OR    ANESTHESIA CARDIOVERSION N/A 8/22/2022    Procedure: CARDIOVERSION;  Surgeon: GENERIC ANESTHESIA PROVIDER;  Location:  OR    ANESTHESIA CARDIOVERSION N/A 9/13/2023    Procedure: Anesthesia cardioversion;  Surgeon: GENERIC ANESTHESIA PROVIDER;  Location:  OR    ANESTHESIA CARDIOVERSION N/A 10/6/2023    Procedure: Anesthesia cardioversion;  Surgeon: GENERIC ANESTHESIA PROVIDER;  Location:  OR    BASAL CELL CARCINOMA EXCISION Right 2009    nose, took cartilage from inner ear.    CARDIOVERSION  2012, 2019    cardioversion for atr fib    COLONOSCOPY  2008    COLONOSCOPY N/A 3/22/2023    Procedure: COLONOSCOPY, FLEXIBLE, WITH LESION REMOVAL USING SNARE;  Surgeon: Anne Lucero MD;  Location:  GI    EP ABLATION FOCAL AFIB N/A 4/12/2019    Procedure: EP Ablation Focal AFIB;  Surgeon: Fady Day MD;  Location:  HEART CARDIAC CATH LAB    HERNIA REPAIR, UMBILICAL  08/20/2012    Procedure: HERNIORRHAPHY UMBILICAL;  UMBILICAL HERNIA REPAIR;  Surgeon: Ra Crocker MD;  Location:  [facility]    HERNIORRHAPHY UMBILICAL  8/20/2012    Procedure: HERNIORRHAPHY UMBILICAL;  UMBILICAL HERNIA REPAIR;  Surgeon: Ra Crocker MD;   Location: [facility]    ORTHOPEDIC SURGERY      ORTHOPEDIC SURGERY      OTHER SURGICAL HISTORY Right 2017    total KNEE ARTHROSCOPY     TOTAL KNEE ARTHROPLASTY Left 2018    ZZ NONSPECIFIC PROCEDURE      achilles tendon    ZZ NONSPECIFIC PROCEDURE      knees, arthroscopy    ZZC NONSPECIFIC PROCEDURE      basal cell skin ca, nose    ZZC NONSPECIFIC PROCEDURE      flex sig; colonoscopy due 3/2008    ZZ NONSPECIFIC PROCEDURE      cardioversion for atr fib    ZZC TOTAL KNEE ARTHROPLASTY Left 2018    Dr. Malick Suarez       FAMILY HISTORY:  Family History   Problem Relation Age of Onset    Family History Negative Mother     Heart Disease Father         decesased at 42 - MI    Heart Disease Sister          MI    Lipids Sister     Lipids Sister     No Known Problems Sister     Heart Disease Brother          MI    Heart Disease Brother         CABG AT 49    Lung Cancer No family hx of        SOCIAL HISTORY:  Social History     Socioeconomic History    Marital status: Single   Tobacco Use    Smoking status: Former     Packs/day: 1.00     Years: 10.00     Additional pack years: 0.00     Total pack years: 10.00     Types: Cigarettes     Start date:      Quit date: 2011     Years since quittin.6     Passive exposure: Past    Smokeless tobacco: Never    Tobacco comments:     quit    Vaping Use    Vaping Use: Never used   Substance and Sexual Activity    Alcohol use: Not Currently    Drug use: Never    Sexual activity: Not Currently     Partners: Female     Birth control/protection: None   Social History Narrative    Worked in retail (-) and insurance sales     Spends some time on [place] on [facility]       Thank you for allowing me to participate in the care of your patient.      Sincerely,     Natalie Campos PA-C     [facility]  cc:   Natalie Campos PA-C  [address] [facility] [place] [address]  [id number]

## 2024-04-12 ENCOUNTER — LAB (OUTPATIENT)
Dept: LAB | Facility: CLINIC | Age: 74
End: 2024-04-12
Payer: COMMERCIAL

## 2024-04-12 DIAGNOSIS — I50.9 ACUTE ON CHRONIC CONGESTIVE HEART FAILURE, UNSPECIFIED HEART FAILURE TYPE (H): ICD-10-CM

## 2024-04-12 LAB
ANION GAP SERPL CALCULATED.3IONS-SCNC: 14 MMOL/L (ref 7–15)
BUN SERPL-MCNC: 23.5 MG/DL (ref 8–23)
CALCIUM SERPL-MCNC: 9.3 MG/DL (ref 8.8–10.2)
CHLORIDE SERPL-SCNC: 101 MMOL/L (ref 98–107)
CREAT SERPL-MCNC: 1.31 MG/DL (ref 0.67–1.17)
DEPRECATED HCO3 PLAS-SCNC: 24 MMOL/L (ref 22–29)
EGFRCR SERPLBLD CKD-EPI 2021: 58 ML/MIN/1.73M2
GLUCOSE SERPL-MCNC: 125 MG/DL (ref 70–99)
POTASSIUM SERPL-SCNC: 3.5 MMOL/L (ref 3.4–5.3)
SODIUM SERPL-SCNC: 139 MMOL/L (ref 135–145)

## 2024-04-12 PROCEDURE — 36415 COLL VENOUS BLD VENIPUNCTURE: CPT

## 2024-04-12 PROCEDURE — 80048 BASIC METABOLIC PNL TOTAL CA: CPT

## 2024-04-19 ENCOUNTER — MYC MEDICAL ADVICE (OUTPATIENT)
Dept: CARDIOLOGY | Facility: CLINIC | Age: 74
End: 2024-04-19
Payer: COMMERCIAL

## 2024-04-19 DIAGNOSIS — R60.0 LOWER EXTREMITY EDEMA: ICD-10-CM

## 2024-04-19 DIAGNOSIS — I48.19 PERSISTENT ATRIAL FIBRILLATION (H): ICD-10-CM

## 2024-04-19 DIAGNOSIS — I10 ESSENTIAL HYPERTENSION: ICD-10-CM

## 2024-04-19 RX ORDER — TORSEMIDE 20 MG/1
80 TABLET ORAL DAILY
Qty: 120 TABLET | Refills: 3 | Status: SHIPPED | OUTPATIENT
Start: 2024-04-22 | End: 2024-04-23

## 2024-04-23 ENCOUNTER — HOSPITAL ENCOUNTER (OUTPATIENT)
Dept: WOUND CARE | Facility: CLINIC | Age: 74
Discharge: HOME | End: 2024-04-23
Attending: FAMILY MEDICINE | Admitting: FAMILY MEDICINE
Payer: COMMERCIAL

## 2024-04-23 VITALS
TEMPERATURE: 96.3 F | DIASTOLIC BLOOD PRESSURE: 85 MMHG | BODY MASS INDEX: 47.46 KG/M2 | SYSTOLIC BLOOD PRESSURE: 153 MMHG | HEART RATE: 116 BPM | WEIGHT: 303 LBS

## 2024-04-23 DIAGNOSIS — L97.902 ULCER OF LOWER LIMB, UNSPECIFIED LATERALITY, WITH FAT LAYER EXPOSED (H): Primary | ICD-10-CM

## 2024-04-23 DIAGNOSIS — S81.801A WOUND OF RIGHT LOWER EXTREMITY, INITIAL ENCOUNTER: ICD-10-CM

## 2024-04-23 DIAGNOSIS — R60.0 LOWER EXTREMITY EDEMA: ICD-10-CM

## 2024-04-23 PROCEDURE — 97602 WOUND(S) CARE NON-SELECTIVE: CPT

## 2024-04-23 PROCEDURE — 99204 OFFICE O/P NEW MOD 45 MIN: CPT | Performed by: FAMILY MEDICINE

## 2024-04-23 PROCEDURE — G0463 HOSPITAL OUTPT CLINIC VISIT: HCPCS | Mod: 25

## 2024-04-23 NOTE — PROGRESS NOTES
Wound Clinic Note         Visit date: 04/23/2024       Cheif Complaint:     Mike Schultz is a 73 year old   male had concerns including WOUND CARE.  The patient has lower extremity edema and a right leg ulcer.         HISTORY OF PRESENT ILLNESS:    Mike Schultz reports the wound has been present since mid March 2024.  The wound began due to the area being bumped.   Prior to this he has never had other difficulty heel wounds in the legs.  He has fairly significant congestive heart failure which he is having trouble with recently.  His primary care doctor has just recently adjusted his diuretics and the patient notes he has been having quite a bit more water retention and trouble breathing.  He is scheduled to see his cardiologist in early May.  He is currently taking torsemide.    Initially after the wound developed he was covering it with a Band-Aid.  Recently he has not been applying any bandages to the area.  There is been light serous drainage from the wound.  He does not wear any form of compression.    He previously had cellulitis in the area and has completed taking antibiotics with no symptoms of an adverse reaction.        The pateint denies fevers or chills.  They report the pain from the wound has been 0/10 and has remained about the same recently.      The patient reports they currently do not have any routine for elevating their legs.     Today the patient reports maintaining a regular diet without special attention to protein.        The patient denies a history of diabetes, smoking or chronic steroid use.         The patient has not had any symptoms of infection relating to the wound recently and is not currently on antibiotics.       Problem List:   Past Medical History:   Diagnosis Date    (HFpEF) heart failure with preserved ejection fraction (H)     Acute gouty arthritis     Alcohol use disorder in remission     Amiodarone pulmonary toxicity     Aortic stenosis     Basal cell  carcinoma     Of the nose    Bell's palsy     Herpes simplex without mention of complication     Hyperlipidemia     Hypertension 2000    Persistent atrial fibrillation (H)     persistent, DCCV 12/2017, ablation 11/6/17    Pulmonary nodules     Sleep apnea     CPAP             Family Hx: family history includes Family History Negative in his mother; Heart Disease in his brother, brother, father, and sister; Lipids in his sister and sister; No Known Problems in his sister.       Surgical Hx:   Past Surgical History:   Procedure Laterality Date    ABLATION OF DYSRHYTHMIC FOCUS  2018, 04/12/2019    Procedure: EP Ablation Focal AFIB;  Surgeon: Fady Day MD;  Location:  [facility]    ACHILLES TENDON SURGERY  1997    ANESTHESIA CARDIOVERSION N/A 2/26/2019    Procedure: ANESTHESIA CARDIOVERSION (CONNER);  Surgeon: GENERIC ANESTHESIA PROVIDER;  Location:  OR    ANESTHESIA CARDIOVERSION N/A 11/19/2020    Procedure: ANESTHESIA, FOR CARDIOVERSION (dr campbell);  Surgeon: GENERIC ANESTHESIA PROVIDER;  Location:  OR    ANESTHESIA CARDIOVERSION N/A 8/22/2022    Procedure: CARDIOVERSION;  Surgeon: GENERIC ANESTHESIA PROVIDER;  Location:  OR    ANESTHESIA CARDIOVERSION N/A 9/13/2023    Procedure: Anesthesia cardioversion;  Surgeon: GENERIC ANESTHESIA PROVIDER;  Location:  OR    ANESTHESIA CARDIOVERSION N/A 10/6/2023    Procedure: Anesthesia cardioversion;  Surgeon: GENERIC ANESTHESIA PROVIDER;  Location:  OR    BASAL CELL CARCINOMA EXCISION Right 2009    nose, took cartilage from inner ear.    CARDIOVERSION  2012, 2019    cardioversion for atr fib    COLONOSCOPY  2008    COLONOSCOPY N/A 3/22/2023    Procedure: COLONOSCOPY, FLEXIBLE, WITH LESION REMOVAL USING SNARE;  Surgeon: Anne Lucero MD;  Location:  GI    EP ABLATION FOCAL AFIB N/A 4/12/2019    Procedure: EP Ablation Focal AFIB;  Surgeon: Fady Day MD;  Location:  [facility]    HERNIA REPAIR, UMBILICAL  08/20/2012     Procedure: HERNIORRHAPHY UMBILICAL;  UMBILICAL HERNIA REPAIR;  Surgeon: Ra Crocker MD;  Location: [facility]    HERNIORRHAPHY UMBILICAL  8/20/2012    Procedure: HERNIORRHAPHY UMBILICAL;  UMBILICAL HERNIA REPAIR;  Surgeon: Ra Crocker MD;  Location: [facility]    ORTHOPEDIC SURGERY      ORTHOPEDIC SURGERY      OTHER SURGICAL HISTORY Right 03/2017    total KNEE ARTHROSCOPY     TOTAL KNEE ARTHROPLASTY Left 03/16/2018    [facility] NONSPECIFIC PROCEDURE  1997    achilles tendon    [facility] NONSPECIFIC PROCEDURE      knees, arthroscopy    [facility] NONSPECIFIC PROCEDURE      basal cell skin ca, nose    [facility] NONSPECIFIC PROCEDURE      flex sig; colonoscopy due 3/2008    [facility] NONSPECIFIC PROCEDURE  2012    cardioversion for atr fib    C TOTAL KNEE ARTHROPLASTY Left 03/16/2018    Dr. Malick Suarez          Allergies:    Allergies   Allergen Reactions    Amiodarone Shortness Of Breath     Suspected amiodarone toxicity involving lungs              Medication History:    Current Outpatient Medications   Medication Sig Dispense Refill    allopurinol (ZYLOPRIM) 300 MG tablet Take 1 tablet (300 mg) by mouth daily 90 tablet 4    cetirizine (ZYRTEC) 10 MG tablet Take 10 mg by mouth as needed for allergies      finasteride (PROPECIA) 1 MG tablet Take 1 tablet (1 mg) by mouth daily 90 tablet 4    losartan (COZAAR) 100 MG tablet Take 0.5 tablets (50 mg) by mouth daily      metoprolol tartrate (LOPRESSOR) 25 MG tablet Take 1 tablet (25 mg) by mouth 2 times daily 180 tablet 3    potassium chloride psuhpa ER (KLOR-CON M20) 20 MEQ CR tablet Take 2 tablets (40 mEq) by mouth 2 times daily 120 tablet 4    rivaroxaban ANTICOAGULANT (XARELTO ANTICOAGULANT) 20 MG TABS tablet Take 1 tablet (20 mg) by mouth daily (with dinner) 90 tablet 3    torsemide (DEMADEX) 20 MG tablet Take 4 tablets (80 mg) by mouth daily 120 tablet 3     No current facility-administered medications for this encounter.         Tobacco History:  reports that he quit smoking about 12 years  ago. His smoking use included cigarettes. He started smoking about 47 years ago. He has a 34.6 pack-year smoking history. He has been exposed to tobacco smoke. He has never used smokeless tobacco.       REVIEW OF SYMPTOMS:   The review of systems was negative except as noted in the HPI.           PHYSICAL EXAMINATION:     BP (!) 153/85 (BP Location: Left arm)   Pulse 116   Temp (!) 96.3  F (35.7  C) (Temporal)   Wt 137.4 kg (303 lb)   BMI 47.46 kg/m             GENERAL: The patient overall appears well and is no acute distress.   HEAD: normocephalic   EYES: Sclera and conjunctiva clear   NECK: no obvious masses   LUNGS: breathing is unlabored.   EXTREMITIES: No clubbing, cyanosis or edema   SKIN: No rashes or other abnormalities except as noted under the Wound section below.   NEUROLOGICAL: normal motor and sensory function   EDEMA: Sever       WOUND: The wound appears healthy with no sign of infection.   Wound bed: granulation tissue  Periwound: healthy intact skin  Fairly small shallow wound on the right anterior/lateral shin.      Also see below for wound details:       Circumferential volume measures:             No data to display                Ulceration(s)/Wound(s):   Please see the media tab under the chart review for pictures of the wounds.  Nursing staff removed dressings and cleansed wound.    Wound (used by OP [facility] only) 04/23/24 0830 Right anterior;lower leg skin tear (Active)   Thickness/Stage full thickness 04/23/24 0831   Base granulating 04/23/24 0831   Periwound edematous 04/23/24 0831   Periwound Temperature warm 04/23/24 0831   Periwound Skin Turgor soft 04/23/24 0831   Edges open 04/23/24 0831   Length (cm) 1 04/23/24 0831   Width (cm) 0.7 04/23/24 0831   Depth (cm) 0.1 04/23/24 0831   Wound (cm^2) 0.7 cm^2 04/23/24 0831   Wound Volume (cm^3) 0.07 cm^3 04/23/24 0831   Drainage Characteristics/Odor serosanguineous 04/23/24 0831   Drainage Amount moderate 04/23/24 0831   Care, Wound non-select  wound debridement performed. 04/23/24 0831             Recent Labs   Lab Test 02/25/19  0756 10/12/17  0814   A1C 5.7* 5.7          Recent Labs   Lab Test 02/11/24  0220 11/26/23  0455 11/25/23  1934   ALBUMIN 3.9 3.6 3.5              No sharp debridement performed today.                  ASSESSMENT:   This is a 73 year old  male with a right leg ulcer, the patient also has lower extremity edema which was also managed during today's clinic visit.          PLAN:   We'll bandage to the area with endoform, Mepilex bandage and a spandagrip stocking changed every other day.    Separate from the wound care instructions we then discussed management strategies for lower extremity edema.  I explained the keys for managing lower extremity edema are compression and elevation.  I explained to the patient today that controlling the edema is probably the most important thing we can do to help heal the wound.  I have specifically recommended that they lay down with their legs above the level of the heart for 30 minutes at least twice a day.     I have explained to the patient the importance of protein intake to wound healing.  I have explained that increasing protein intake will speed wound healing.  We discussed several types of food that are high in protein and the wound care nurse gave the patient a handout that summarizes this information.  In addition to further speed wound healing I have encouraged the patient to take a protein supplement.   The patient will return to the wound clinic in 3 to 4 weeks to see me again.        45 minutes spent on the date of the encounter doing chart review, history and exam, documentation and further activities per the note, this time excludes any procedure time      Tom Cason MD  04/23/2024   9:03 AM   [facility] Vascular/Wound  [phone]    This note was electronically signed by Tom Cason MD        Further instructions from your care team         04/23/2024    Brayden Schultz   1950    A DME order for supplies has been placed to [employer]. If there are any issues with your order including not receiving the order please call our clinic at [phone]. Do not call [employer]. We are better able to help you. We can contact the [employer] rep to better assist than if you call the general [employer] number. We can provide a tracking number also if needed.     Dressing changes outside of clinic are being performed by Patient    Wound Dressing Change: right anterior lower leg   - Wash your hands with soap and water before you begin your dressing change and prepare a clean surface for dressings.  - OK to shower; remove old dressings prior to shower and redress immediately afterwards  - Cleanse with mild unscented soap and water (such as Cetaphil, Cerave or Dove)   - Primary dressing: apply 1/10th piece of 2x2 endoform plain  - Secondary dressing: cover with 3x3  mepilex silicone border  - Wear Spandagrip size G from behind toes to just below knees   Change three times a week and if gets excess soilage or wetness    Do not ice the wound as it restricts blood flow and slows healing.  Warmth is ok.  Main focus is to control swelling (elevation and compression) and dressings.    Compression:   Your compression is Spandagrip G and can be removed at night and put back on first thing in the morning.   Please remove compression dressing if toes turn blue and/or tingle and can not be relieved by raising the leg for one hour. If unable to reapply in the morning, keep compression on until next dressing change.    Elevation:  It is recommended that you elevate your legs above the level of your heart for 30 minutes: approximately 2-3 times each day   IF you have shortness of breath or trouble breathing while doing this, then stop.  Ways to do this:   - Lay on the couch or your bed and prop your legs up on pillows   - Recline back as far as you can go in your recliner and prop your legs on  pillows.   Doing these things will help reduce the edema in your legs.     Walk as much as you can, as you are able. Whenever you sit raise your ankle above your hips to promote wound healing.      A diet high in protein is important for wound healing, we recommend getting 90 grams of protein per day. Taking protein shakes or bars are a good way to get extra protein in your diet.     Good sources of protein:  Pork 26g per 3 oz  Whey protein powder - 24g per scoop (on average)  Greek yogurt - 23g per 8oz   Chicken or Turkey - 23g per 3oz  Fish - 20-25g per 3oz  Beef - 18-23g per 3oz  Tofu - 10g per 1/2 cup  Navy beans - 20g per cup  Cottage cheese - 14g per 1/2 cup   Lentils - 13g per 1/4 cup  Beef jerky 13g per 1oz  2% milk - 8g per cup  Peanut butter - 8g per 2 tablespoons  Eggs - 6g per egg  Mixed nuts - 6g per 2oz      Main Provider: Tom Cason M.D. April 23, 2024    Call us at [phone] if you have any questions about your wounds, if you have redness or swelling around your wound, have a fever of 101 degrees Fahrenheit or greater or if you have any other problems or concerns. We answer the phone Monday through Friday 8 am to 4 pm, please leave a message as we check the voicemail frequently throughout the day. If you have a concern over the weekend, please leave a message and we will return your call Monday. If the need is urgent, go to the ER or urgent care.    If you had a positive experience please indicate that on your patient satisfaction survey form that [facility] will be sending you.    It was a pleasure meeting with you today.  Thank you for allowing me and my team the privilege of caring for you today.  YOU are the reason we are here, and I truly hope we provided you with the excellent service you deserve.  Please let us know if there is anything else we can do for you so that we can be sure you are leaving completely satisfied with your care experience.      If you have any billing  related questions please call the [facility] Business office at [phone]. The clinic staff does not handle billing related matters.    If you are scheduled to have a follow up appointment, you will receive a reminder call the day before your visit. On the appointment day please arrive 15 minutes prior to your appointment time. If you are unable to keep that appointment, please call the clinic to cancel or reschedule. If you are more than 10 minutes late or greater for your scheduled appointment time, the clinic policy is that you may be asked to reschedule.        ,

## 2024-04-23 NOTE — DISCHARGE INSTRUCTIONS
04/23/2024   Brayden Schultz   1950    A DME order for supplies has been placed to [employer]. If there are any issues with your order including not receiving the order please call our clinic at [phone]. Do not call [employer]. We are better able to help you. We can contact the [employer] rep to better assist than if you call the general [employer] number. We can provide a tracking number also if needed.     Dressing changes outside of clinic are being performed by Patient    Wound Dressing Change: right anterior lower leg   - Wash your hands with soap and water before you begin your dressing change and prepare a clean surface for dressings.  - OK to shower; remove old dressings prior to shower and redress immediately afterwards  - Cleanse with mild unscented soap and water (such as Cetaphil, Cerave or Dove)   - Primary dressing: apply 1/10th piece of 2x2 endoform plain  - Secondary dressing: cover with 3x3  mepilex silicone border  - Wear Spandagrip size G from behind toes to just below knees   Change three times a week and if gets excess soilage or wetness    Do not ice the wound as it restricts blood flow and slows healing.  Warmth is ok.  Main focus is to control swelling (elevation and compression) and dressings.    Compression:   Your compression is Spandagrip G and can be removed at night and put back on first thing in the morning.   Please remove compression dressing if toes turn blue and/or tingle and can not be relieved by raising the leg for one hour. If unable to reapply in the morning, keep compression on until next dressing change.    Elevation:  It is recommended that you elevate your legs above the level of your heart for 30 minutes: approximately 2-3 times each day   IF you have shortness of breath or trouble breathing while doing this, then stop.  Ways to do this:   - Lay on the couch or your bed and prop your legs up on pillows   - Recline back as far as you can go in your recliner and prop your  legs on pillows.   Doing these things will help reduce the edema in your legs.     Walk as much as you can, as you are able. Whenever you sit raise your ankle above your hips to promote wound healing.      A diet high in protein is important for wound healing, we recommend getting 90 grams of protein per day. Taking protein shakes or bars are a good way to get extra protein in your diet.     Good sources of protein:  Pork 26g per 3 oz  Whey protein powder - 24g per scoop (on average)  Greek yogurt - 23g per 8oz   Chicken or Turkey - 23g per 3oz  Fish - 20-25g per 3oz  Beef - 18-23g per 3oz  Tofu - 10g per 1/2 cup  Navy beans - 20g per cup  Cottage cheese - 14g per 1/2 cup   Lentils - 13g per 1/4 cup  Beef jerky 13g per 1oz  2% milk - 8g per cup  Peanut butter - 8g per 2 tablespoons  Eggs - 6g per egg  Mixed nuts - 6g per 2oz      Main Provider: Tom Cason M.D. April 23, 2024    Call us at [phone] if you have any questions about your wounds, if you have redness or swelling around your wound, have a fever of 101 degrees Fahrenheit or greater or if you have any other problems or concerns. We answer the phone Monday through Friday 8 am to 4 pm, please leave a message as we check the voicemail frequently throughout the day. If you have a concern over the weekend, please leave a message and we will return your call Monday. If the need is urgent, go to the ER or urgent care.    If you had a positive experience please indicate that on your patient satisfaction survey form that [facility] will be sending you.    It was a pleasure meeting with you today.  Thank you for allowing me and my team the privilege of caring for you today.  YOU are the reason we are here, and I truly hope we provided you with the excellent service you deserve.  Please let us know if there is anything else we can do for you so that we can be sure you are leaving completely satisfied with your care experience.      If you have any  billing related questions please call the [facility] Business office at [phone]. The clinic staff does not handle billing related matters.    If you are scheduled to have a follow up appointment, you will receive a reminder call the day before your visit. On the appointment day please arrive 15 minutes prior to your appointment time. If you are unable to keep that appointment, please call the clinic to cancel or reschedule. If you are more than 10 minutes late or greater for your scheduled appointment time, the clinic policy is that you may be asked to reschedule.

## 2024-04-23 NOTE — PROGRESS NOTES
Patient arrived for wound care visit. Certified Wound Care Nurse time spent evaluating patient record, completed a full evaluation and documented wound(s) & iva-wound skin; provided recommendation based on treatment plan. Applied dressing, reviewed discharge instructions, patient education, and discussed plan of care with appropriate medical team staff members and patient and/or family members.

## 2024-04-23 NOTE — ADDENDUM NOTE
Encounter addended by: Shanthi Greer RN on: 4/23/2024 9:52 AM   Actions taken: Visit diagnoses modified, Order list changed, Diagnosis association updated

## 2024-04-26 DIAGNOSIS — I48.19 PERSISTENT ATRIAL FIBRILLATION (H): Primary | ICD-10-CM

## 2024-05-01 ENCOUNTER — ANESTHESIA EVENT (OUTPATIENT)
Dept: CARDIOLOGY | Facility: CLINIC | Age: 74
DRG: 273 | End: 2024-05-01
Payer: COMMERCIAL

## 2024-05-01 ENCOUNTER — TELEPHONE (OUTPATIENT)
Dept: CARDIOLOGY | Facility: CLINIC | Age: 74
End: 2024-05-01
Payer: COMMERCIAL

## 2024-05-01 DIAGNOSIS — I48.0 PAROXYSMAL ATRIAL FIBRILLATION (H): Primary | ICD-10-CM

## 2024-05-01 RX ORDER — LIDOCAINE 40 MG/G
CREAM TOPICAL
Status: CANCELLED | OUTPATIENT
Start: 2024-05-01

## 2024-05-01 RX ORDER — SODIUM CHLORIDE, SODIUM LACTATE, POTASSIUM CHLORIDE, CALCIUM CHLORIDE 600; 310; 30; 20 MG/100ML; MG/100ML; MG/100ML; MG/100ML
INJECTION, SOLUTION INTRAVENOUS CONTINUOUS
Status: CANCELLED | OUTPATIENT
Start: 2024-05-01

## 2024-05-01 NOTE — TELEPHONE ENCOUNTER
5/1/24 Called pt regarding Afib Ablation 5/2/24  Notified of arrival time and location -630 am [facility] welcome desk   No solids 8 hours prior to arrival time  Clear liquids up until 2 hours prior to arrival  Discussed clear liquids allowed ( 7 up, ginger ale, chicken broth, apple juice, water, coffee with no cream or sugar)   Pt may have sips of water for am meds  Discussed meds to be held - am Torsemide and KCL    Oral diabetes meds: NONE  Insulin: NONE  SGLT2 Inhibitors: NONE  GLP-1 Agonists: NONE    Discussed that patient will need a  for ride home and someone to stay with pt x 24 hours once pt arrives home   Pt will check temperature am of procedure and call [facility] at [phone] in the am in temp > 100.0  Pt voiced understanding and stated they have no further questions at this time.  Byron  1025 am

## 2024-05-02 ENCOUNTER — HOSPITAL ENCOUNTER (OUTPATIENT)
Facility: CLINIC | Age: 74
Discharge: HOME | End: 2024-05-02
Admitting: INTERNAL MEDICINE
Payer: COMMERCIAL

## 2024-05-02 ENCOUNTER — ANESTHESIA (OUTPATIENT)
Dept: CARDIOLOGY | Facility: CLINIC | Age: 74
DRG: 273 | End: 2024-05-02
Payer: COMMERCIAL

## 2024-05-02 VITALS
RESPIRATION RATE: 16 BRPM | TEMPERATURE: 97.2 F | HEIGHT: 67 IN | DIASTOLIC BLOOD PRESSURE: 73 MMHG | HEART RATE: 86 BPM | OXYGEN SATURATION: 92 % | SYSTOLIC BLOOD PRESSURE: 92 MMHG | BODY MASS INDEX: 47.09 KG/M2 | WEIGHT: 300 LBS

## 2024-05-02 DIAGNOSIS — I48.0 PAROXYSMAL ATRIAL FIBRILLATION (H): ICD-10-CM

## 2024-05-02 DIAGNOSIS — I48.19 PERSISTENT ATRIAL FIBRILLATION (H): ICD-10-CM

## 2024-05-02 LAB
ACT BLD: 244 SECONDS (ref 74–150)
ANION GAP SERPL CALCULATED.3IONS-SCNC: 11 MMOL/L (ref 7–15)
ATRIAL RATE - MUSE: 300 BPM
BUN SERPL-MCNC: 21.7 MG/DL (ref 8–23)
CALCIUM SERPL-MCNC: 8.1 MG/DL (ref 8.8–10.2)
CHLORIDE SERPL-SCNC: 106 MMOL/L (ref 98–107)
CREAT SERPL-MCNC: 1.13 MG/DL (ref 0.67–1.17)
DEPRECATED HCO3 PLAS-SCNC: 24 MMOL/L (ref 22–29)
DIASTOLIC BLOOD PRESSURE - MUSE: NORMAL MMHG
EGFRCR SERPLBLD CKD-EPI 2021: 69 ML/MIN/1.73M2
ERYTHROCYTE [DISTWIDTH] IN BLOOD BY AUTOMATED COUNT: 14.2 % (ref 10–15)
GLUCOSE SERPL-MCNC: 111 MG/DL (ref 70–99)
HCT VFR BLD AUTO: 40.3 % (ref 40–53)
HGB BLD-MCNC: 13.6 G/DL (ref 13.3–17.7)
HOLD SPECIMEN: NORMAL
INTERPRETATION ECG - MUSE: NORMAL
MCH RBC QN AUTO: 33.2 PG (ref 26.5–33)
MCHC RBC AUTO-ENTMCNC: 33.7 G/DL (ref 31.5–36.5)
MCV RBC AUTO: 98 FL (ref 78–100)
P AXIS - MUSE: NORMAL DEGREES
PLATELET # BLD AUTO: 224 10E3/UL (ref 150–450)
POTASSIUM SERPL-SCNC: 3.7 MMOL/L (ref 3.4–5.3)
PR INTERVAL - MUSE: NORMAL MS
QRS DURATION - MUSE: 86 MS
QT - MUSE: 432 MS
QTC - MUSE: 504 MS
R AXIS - MUSE: 66 DEGREES
RBC # BLD AUTO: 4.1 10E6/UL (ref 4.4–5.9)
SODIUM SERPL-SCNC: 141 MMOL/L (ref 135–145)
SYSTOLIC BLOOD PRESSURE - MUSE: NORMAL MMHG
T AXIS - MUSE: 125 DEGREES
VENTRICULAR RATE- MUSE: 82 BPM
WBC # BLD AUTO: 8.7 10E3/UL (ref 4–11)

## 2024-05-02 PROCEDURE — C1759 CATH, INTRA ECHOCARDIOGRAPHY: HCPCS | Performed by: INTERNAL MEDICINE

## 2024-05-02 PROCEDURE — 93656 COMPRE EP EVAL ABLTJ ATR FIB: CPT | Performed by: INTERNAL MEDICINE

## 2024-05-02 PROCEDURE — C1730 CATH, EP, 19 OR FEW ELECT: HCPCS | Performed by: INTERNAL MEDICINE

## 2024-05-02 PROCEDURE — 4A0234Z MEASUREMENT OF CARDIAC ELECTRICAL ACTIVITY, PERCUTANEOUS APPROACH: ICD-10-PCS | Performed by: INTERNAL MEDICINE

## 2024-05-02 PROCEDURE — 93655 ICAR CATH ABLTJ DSCRT ARRHYT: CPT | Performed by: INTERNAL MEDICINE

## 2024-05-02 PROCEDURE — 250N000009 HC RX 250: Performed by: NURSE ANESTHETIST, CERTIFIED REGISTERED

## 2024-05-02 PROCEDURE — 250N000009 HC RX 250: Performed by: INTERNAL MEDICINE

## 2024-05-02 PROCEDURE — 999N000071 HC STATISTIC HEART CATH LAB OR EP LAB

## 2024-05-02 PROCEDURE — 999N000054 HC STATISTIC EKG NON-CHARGEABLE

## 2024-05-02 PROCEDURE — 250N000025 HC SEVOFLURANE, PER MIN: Performed by: INTERNAL MEDICINE

## 2024-05-02 PROCEDURE — 93656 COMPRE EP EVAL ABLTJ ATR FIB: CPT | Performed by: NURSE ANESTHETIST, CERTIFIED REGISTERED

## 2024-05-02 PROCEDURE — 272N000001 HC OR GENERAL SUPPLY STERILE: Performed by: INTERNAL MEDICINE

## 2024-05-02 PROCEDURE — 250N000011 HC RX IP 250 OP 636: Performed by: NURSE ANESTHETIST, CERTIFIED REGISTERED

## 2024-05-02 PROCEDURE — 370N000017 HC ANESTHESIA TECHNICAL FEE, PER MIN: Performed by: INTERNAL MEDICINE

## 2024-05-02 PROCEDURE — 80048 BASIC METABOLIC PNL TOTAL CA: CPT | Performed by: INTERNAL MEDICINE

## 2024-05-02 PROCEDURE — 02583ZZ DESTRUCTION OF CONDUCTION MECHANISM, PERCUTANEOUS APPROACH: ICD-10-PCS | Performed by: INTERNAL MEDICINE

## 2024-05-02 PROCEDURE — C1887 CATHETER, GUIDING: HCPCS | Performed by: INTERNAL MEDICINE

## 2024-05-02 PROCEDURE — C1733 CATH, EP, OTHR THAN COOL-TIP: HCPCS | Performed by: INTERNAL MEDICINE

## 2024-05-02 PROCEDURE — 85347 COAGULATION TIME ACTIVATED: CPT

## 2024-05-02 PROCEDURE — 93656 COMPRE EP EVAL ABLTJ ATR FIB: CPT | Performed by: ANESTHESIOLOGY

## 2024-05-02 PROCEDURE — C1760 CLOSURE DEV, VASC: HCPCS | Performed by: INTERNAL MEDICINE

## 2024-05-02 PROCEDURE — 258N000003 HC RX IP 258 OP 636: Performed by: NURSE ANESTHETIST, CERTIFIED REGISTERED

## 2024-05-02 PROCEDURE — 93005 ELECTROCARDIOGRAM TRACING: CPT

## 2024-05-02 PROCEDURE — 93010 ELECTROCARDIOGRAM REPORT: CPT | Performed by: INTERNAL MEDICINE

## 2024-05-02 PROCEDURE — 258N000003 HC RX IP 258 OP 636: Performed by: INTERNAL MEDICINE

## 2024-05-02 PROCEDURE — C1766 INTRO/SHEATH,STRBLE,NON-PEEL: HCPCS | Performed by: INTERNAL MEDICINE

## 2024-05-02 PROCEDURE — 36415 COLL VENOUS BLD VENIPUNCTURE: CPT | Performed by: INTERNAL MEDICINE

## 2024-05-02 PROCEDURE — 250N000011 HC RX IP 250 OP 636: Performed by: INTERNAL MEDICINE

## 2024-05-02 PROCEDURE — C1732 CATH, EP, DIAG/ABL, 3D/VECT: HCPCS | Performed by: INTERNAL MEDICINE

## 2024-05-02 PROCEDURE — 999N000184 HC STATISTIC TELEMETRY

## 2024-05-02 PROCEDURE — 99100 ANES PT EXTEME AGE<1 YR&>70: CPT | Performed by: NURSE ANESTHETIST, CERTIFIED REGISTERED

## 2024-05-02 PROCEDURE — 85027 COMPLETE CBC AUTOMATED: CPT | Performed by: INTERNAL MEDICINE

## 2024-05-02 RX ORDER — NALOXONE HYDROCHLORIDE 0.4 MG/ML
0.4 INJECTION, SOLUTION INTRAMUSCULAR; INTRAVENOUS; SUBCUTANEOUS
Status: DISCONTINUED | OUTPATIENT
Start: 2024-05-02 | End: 2024-05-02 | Stop reason: HOSPADM

## 2024-05-02 RX ORDER — DEXAMETHASONE SODIUM PHOSPHATE 10 MG/ML
4 INJECTION, SOLUTION INTRAMUSCULAR; INTRAVENOUS
Status: DISCONTINUED | OUTPATIENT
Start: 2024-05-02 | End: 2024-05-02 | Stop reason: HOSPADM

## 2024-05-02 RX ORDER — HYDROMORPHONE HYDROCHLORIDE 1 MG/ML
0.4 INJECTION, SOLUTION INTRAMUSCULAR; INTRAVENOUS; SUBCUTANEOUS EVERY 5 MIN PRN
Status: DISCONTINUED | OUTPATIENT
Start: 2024-05-02 | End: 2024-05-02 | Stop reason: HOSPADM

## 2024-05-02 RX ORDER — NALOXONE HYDROCHLORIDE 0.4 MG/ML
0.1 INJECTION, SOLUTION INTRAMUSCULAR; INTRAVENOUS; SUBCUTANEOUS
Status: DISCONTINUED | OUTPATIENT
Start: 2024-05-02 | End: 2024-05-02 | Stop reason: HOSPADM

## 2024-05-02 RX ORDER — DEXAMETHASONE SODIUM PHOSPHATE 4 MG/ML
4 INJECTION, SOLUTION INTRA-ARTICULAR; INTRALESIONAL; INTRAMUSCULAR; INTRAVENOUS; SOFT TISSUE
Status: DISCONTINUED | OUTPATIENT
Start: 2024-05-02 | End: 2024-05-02 | Stop reason: HOSPADM

## 2024-05-02 RX ORDER — NALOXONE HYDROCHLORIDE 0.4 MG/ML
0.2 INJECTION, SOLUTION INTRAMUSCULAR; INTRAVENOUS; SUBCUTANEOUS
Status: DISCONTINUED | OUTPATIENT
Start: 2024-05-02 | End: 2024-05-02 | Stop reason: HOSPADM

## 2024-05-02 RX ORDER — ONDANSETRON 4 MG/1
4 TABLET, ORALLY DISINTEGRATING ORAL EVERY 30 MIN PRN
Status: DISCONTINUED | OUTPATIENT
Start: 2024-05-02 | End: 2024-05-02 | Stop reason: HOSPADM

## 2024-05-02 RX ORDER — HYDRALAZINE HYDROCHLORIDE 20 MG/ML
2.5-5 INJECTION INTRAMUSCULAR; INTRAVENOUS EVERY 10 MIN PRN
Status: DISCONTINUED | OUTPATIENT
Start: 2024-05-02 | End: 2024-05-02 | Stop reason: HOSPADM

## 2024-05-02 RX ORDER — PROPOFOL 10 MG/ML
INJECTION, EMULSION INTRAVENOUS PRN
Status: DISCONTINUED | OUTPATIENT
Start: 2024-05-02 | End: 2024-05-02

## 2024-05-02 RX ORDER — OXYCODONE AND ACETAMINOPHEN 5; 325 MG/1; MG/1
1 TABLET ORAL EVERY 4 HOURS PRN
Status: DISCONTINUED | OUTPATIENT
Start: 2024-05-02 | End: 2024-05-02 | Stop reason: HOSPADM

## 2024-05-02 RX ORDER — ONDANSETRON 2 MG/ML
4 INJECTION INTRAMUSCULAR; INTRAVENOUS EVERY 30 MIN PRN
Status: DISCONTINUED | OUTPATIENT
Start: 2024-05-02 | End: 2024-05-02 | Stop reason: HOSPADM

## 2024-05-02 RX ORDER — PROTAMINE SULFATE 10 MG/ML
INJECTION, SOLUTION INTRAVENOUS
Status: DISCONTINUED | OUTPATIENT
Start: 2024-05-02 | End: 2024-05-02 | Stop reason: HOSPADM

## 2024-05-02 RX ORDER — KETOROLAC TROMETHAMINE 30 MG/ML
INJECTION, SOLUTION INTRAMUSCULAR; INTRAVENOUS PRN
Status: DISCONTINUED | OUTPATIENT
Start: 2024-05-02 | End: 2024-05-02

## 2024-05-02 RX ORDER — DEXAMETHASONE SODIUM PHOSPHATE 4 MG/ML
INJECTION, SOLUTION INTRA-ARTICULAR; INTRALESIONAL; INTRAMUSCULAR; INTRAVENOUS; SOFT TISSUE PRN
Status: DISCONTINUED | OUTPATIENT
Start: 2024-05-02 | End: 2024-05-02

## 2024-05-02 RX ORDER — LIDOCAINE HYDROCHLORIDE 20 MG/ML
INJECTION, SOLUTION INFILTRATION; PERINEURAL PRN
Status: DISCONTINUED | OUTPATIENT
Start: 2024-05-02 | End: 2024-05-02

## 2024-05-02 RX ORDER — FENTANYL CITRATE 50 UG/ML
INJECTION, SOLUTION INTRAMUSCULAR; INTRAVENOUS PRN
Status: DISCONTINUED | OUTPATIENT
Start: 2024-05-02 | End: 2024-05-02

## 2024-05-02 RX ORDER — HYDROMORPHONE HYDROCHLORIDE 1 MG/ML
0.2 INJECTION, SOLUTION INTRAMUSCULAR; INTRAVENOUS; SUBCUTANEOUS EVERY 5 MIN PRN
Status: DISCONTINUED | OUTPATIENT
Start: 2024-05-02 | End: 2024-05-02 | Stop reason: HOSPADM

## 2024-05-02 RX ORDER — SODIUM CHLORIDE, SODIUM LACTATE, POTASSIUM CHLORIDE, CALCIUM CHLORIDE 600; 310; 30; 20 MG/100ML; MG/100ML; MG/100ML; MG/100ML
INJECTION, SOLUTION INTRAVENOUS CONTINUOUS
Status: DISCONTINUED | OUTPATIENT
Start: 2024-05-02 | End: 2024-05-02 | Stop reason: HOSPADM

## 2024-05-02 RX ORDER — ONDANSETRON 2 MG/ML
INJECTION INTRAMUSCULAR; INTRAVENOUS PRN
Status: DISCONTINUED | OUTPATIENT
Start: 2024-05-02 | End: 2024-05-02

## 2024-05-02 RX ORDER — OXYCODONE HYDROCHLORIDE 5 MG/1
10 TABLET ORAL
Status: DISCONTINUED | OUTPATIENT
Start: 2024-05-02 | End: 2024-05-02 | Stop reason: HOSPADM

## 2024-05-02 RX ORDER — LIDOCAINE 40 MG/G
CREAM TOPICAL
Status: DISCONTINUED | OUTPATIENT
Start: 2024-05-02 | End: 2024-05-02 | Stop reason: HOSPADM

## 2024-05-02 RX ORDER — FENTANYL CITRATE 50 UG/ML
25 INJECTION, SOLUTION INTRAMUSCULAR; INTRAVENOUS
Status: DISCONTINUED | OUTPATIENT
Start: 2024-05-02 | End: 2024-05-02 | Stop reason: HOSPADM

## 2024-05-02 RX ORDER — MEPERIDINE HYDROCHLORIDE 25 MG/ML
12.5 INJECTION INTRAMUSCULAR; INTRAVENOUS; SUBCUTANEOUS EVERY 5 MIN PRN
Status: DISCONTINUED | OUTPATIENT
Start: 2024-05-02 | End: 2024-05-02 | Stop reason: HOSPADM

## 2024-05-02 RX ORDER — LABETALOL 20 MG/4 ML (5 MG/ML) INTRAVENOUS SYRINGE
10
Status: DISCONTINUED | OUTPATIENT
Start: 2024-05-02 | End: 2024-05-02 | Stop reason: HOSPADM

## 2024-05-02 RX ORDER — FENTANYL CITRATE 50 UG/ML
50 INJECTION, SOLUTION INTRAMUSCULAR; INTRAVENOUS EVERY 5 MIN PRN
Status: DISCONTINUED | OUTPATIENT
Start: 2024-05-02 | End: 2024-05-02 | Stop reason: HOSPADM

## 2024-05-02 RX ORDER — ALBUTEROL SULFATE 0.83 MG/ML
2.5 SOLUTION RESPIRATORY (INHALATION) EVERY 4 HOURS PRN
Status: DISCONTINUED | OUTPATIENT
Start: 2024-05-02 | End: 2024-05-02 | Stop reason: HOSPADM

## 2024-05-02 RX ORDER — HEPARIN SODIUM 1000 [USP'U]/ML
INJECTION, SOLUTION INTRAVENOUS; SUBCUTANEOUS
Status: DISCONTINUED | OUTPATIENT
Start: 2024-05-02 | End: 2024-05-02 | Stop reason: HOSPADM

## 2024-05-02 RX ORDER — FENTANYL CITRATE 50 UG/ML
25 INJECTION, SOLUTION INTRAMUSCULAR; INTRAVENOUS EVERY 5 MIN PRN
Status: DISCONTINUED | OUTPATIENT
Start: 2024-05-02 | End: 2024-05-02 | Stop reason: HOSPADM

## 2024-05-02 RX ORDER — OXYCODONE HYDROCHLORIDE 5 MG/1
5 TABLET ORAL
Status: DISCONTINUED | OUTPATIENT
Start: 2024-05-02 | End: 2024-05-02 | Stop reason: HOSPADM

## 2024-05-02 RX ADMIN — PHENYLEPHRINE HYDROCHLORIDE 200 MCG: 10 INJECTION INTRAVENOUS at 08:46

## 2024-05-02 RX ADMIN — PHENYLEPHRINE HYDROCHLORIDE 200 MCG: 10 INJECTION INTRAVENOUS at 10:06

## 2024-05-02 RX ADMIN — SODIUM CHLORIDE, POTASSIUM CHLORIDE, SODIUM LACTATE AND CALCIUM CHLORIDE: 600; 310; 30; 20 INJECTION, SOLUTION INTRAVENOUS at 07:26

## 2024-05-02 RX ADMIN — LIDOCAINE HYDROCHLORIDE 100 MG: 20 INJECTION, SOLUTION INFILTRATION; PERINEURAL at 08:43

## 2024-05-02 RX ADMIN — PHENYLEPHRINE HYDROCHLORIDE 0.5 MCG/KG/MIN: 10 INJECTION INTRAVENOUS at 08:47

## 2024-05-02 RX ADMIN — ROCURONIUM BROMIDE 50 MG: 50 INJECTION, SOLUTION INTRAVENOUS at 08:43

## 2024-05-02 RX ADMIN — PHENYLEPHRINE HYDROCHLORIDE 200 MCG: 10 INJECTION INTRAVENOUS at 08:48

## 2024-05-02 RX ADMIN — PROPOFOL 200 MG: 10 INJECTION, EMULSION INTRAVENOUS at 08:43

## 2024-05-02 RX ADMIN — ONDANSETRON 4 MG: 2 INJECTION INTRAMUSCULAR; INTRAVENOUS at 09:57

## 2024-05-02 RX ADMIN — FENTANYL CITRATE 100 MCG: 50 INJECTION INTRAMUSCULAR; INTRAVENOUS at 08:43

## 2024-05-02 RX ADMIN — KETOROLAC TROMETHAMINE 15 MG: 30 INJECTION, SOLUTION INTRAMUSCULAR at 09:47

## 2024-05-02 RX ADMIN — DEXAMETHASONE SODIUM PHOSPHATE 8 MG: 4 INJECTION, SOLUTION INTRA-ARTICULAR; INTRALESIONAL; INTRAMUSCULAR; INTRAVENOUS; SOFT TISSUE at 08:53

## 2024-05-02 ASSESSMENT — ACTIVITIES OF DAILY LIVING (ADL)
ADLS_ACUITY_SCORE: 38

## 2024-05-02 ASSESSMENT — ENCOUNTER SYMPTOMS: DYSRHYTHMIAS: 1

## 2024-05-02 NOTE — OR NURSING
Pt eager to transfer, tolerating PO, came to PACU alert and oriented. All WNL. Attempted to call report x2. PACU RN BRITTANY.

## 2024-05-02 NOTE — PROGRESS NOTES
Some reconnected PV potentials noted which were re-isolated. The LA posterior wall was re-isolated. Empiric ablation of the CTI done. Accesses were perclosed. Bedrest for an hour. Prilosec 20 mg daily for 30 days.

## 2024-05-02 NOTE — Clinical Note
Arrhythmia Type: atrial fibrillation.   Method of Cardioversion: synchronous.   The arrhythmia was terminated.   Energy shock delivered: 200 joules.   Time shock delivered: 09:07 CDT.   Post cardioversion rhythm: sinus rhythm.

## 2024-05-02 NOTE — PROGRESS NOTES
[facility] Post Procedure Note    Patient Information  Name: Mike Schultz  Age: 73 year old    Post Procedure  Time patient returned to [facility]: 1130  Concerns/abnormal assessment: none  If abnormal assessment, provider notified: N/A  Plan/Other: plan to be discharged at 1230    Herlinda Valera RN

## 2024-05-02 NOTE — PROGRESS NOTES
[facility] Admission Nursing Note    Patient Information  Name: Mike Schultz  Age: 73 year old  Reason for admission: afib ablation  [facility] arrival time: 0630    Visitor Information  Name: none     Patient Admission/Assessment   Pre-procedure assessment complete: Yes  If abnormal assessment/labs, provider notified: N/A  NPO: Yes  Medications held per instructions/orders: Yes  Consent: deferred  If applicable, pregnancy test status: deferred  Patient oriented to room: Yes  Education/questions answered: Yes  Plan/other: doppler pedal pulses bilat    Discharge Planning  Discharge name/phone number:Katty larson [phone]  Overnight post sedation caregiver: katty  Discharge location: home    Maria De Jesus Vu RN

## 2024-05-02 NOTE — ANESTHESIA CARE TRANSFER NOTE
Patient: Mike Schultz    Procedure: Procedure(s):  Ablation Atrial Fibrilation       Diagnosis: permanent atrial fibrillation  Diagnosis Additional Information: No value filed.    Anesthesia Type:   General     Note:    Oropharynx: oropharynx clear of all foreign objects  Level of Consciousness: awake  Oxygen Supplementation: face mask  Level of Supplemental Oxygen (L/min / FiO2): 10  Independent Airway: airway patency satisfactory and stable  Dentition: dentition unchanged  Vital Signs Stable: post-procedure vital signs reviewed and stable  Report to RN Given: handoff report given  Patient transferred to: PACU    Handoff Report: Identifed the Patient, Identified the Reponsible Provider, Reviewed the pertinent medical history, Discussed the surgical course, Reviewed Intra-OP anesthesia mangement and issues during anesthesia, Set expectations for post-procedure period and Allowed opportunity for questions and acknowledgement of understanding      Vitals:  Vitals Value Taken Time   /72 05/02/24 1014   Temp     Pulse 84 05/02/24 1016   Resp 13 05/02/24 1016   SpO2 95 % 05/02/24 1016   Vitals shown include unfiled device data.    Electronically Signed By: AYAN Pate CRNA  May 2, 2024  10:17 AM

## 2024-05-02 NOTE — ANESTHESIA PREPROCEDURE EVALUATION
Anesthesia Pre-Procedure Evaluation    Patient: Mike Schultz   MRN: [id number] : 1950        Procedure : Procedure(s):  Ablation Atrial Fibrilation          Past Medical History:   Diagnosis Date    (HFpEF) heart failure with preserved ejection fraction (H)     Acute gouty arthritis     Alcohol use disorder in remission     Amiodarone pulmonary toxicity     Aortic stenosis     Basal cell carcinoma     Of the nose    Bell's palsy     Herpes simplex without mention of complication     Hyperlipidemia     Hypertension     Persistent atrial fibrillation (H)     persistent, DCCV 2017, ablation 17    Pulmonary nodules     Sleep apnea     CPAP      Past Surgical History:   Procedure Laterality Date    ABLATION OF DYSRHYTHMIC FOCUS  , 2019    Procedure: EP Ablation Focal AFIB;  Surgeon: Fady Day MD;  Location:  [facility]    ACHILLES TENDON SURGERY      ANESTHESIA CARDIOVERSION N/A 2019    Procedure: ANESTHESIA CARDIOVERSION (CONNER);  Surgeon: GENERIC ANESTHESIA PROVIDER;  Location:  OR    ANESTHESIA CARDIOVERSION N/A 2020    Procedure: ANESTHESIA, FOR CARDIOVERSION (dr campbell);  Surgeon: GENERIC ANESTHESIA PROVIDER;  Location:  OR    ANESTHESIA CARDIOVERSION N/A 2022    Procedure: CARDIOVERSION;  Surgeon: GENERIC ANESTHESIA PROVIDER;  Location:  OR    ANESTHESIA CARDIOVERSION N/A 2023    Procedure: Anesthesia cardioversion;  Surgeon: GENERIC ANESTHESIA PROVIDER;  Location:  OR    ANESTHESIA CARDIOVERSION N/A 10/6/2023    Procedure: Anesthesia cardioversion;  Surgeon: GENERIC ANESTHESIA PROVIDER;  Location:  OR    BASAL CELL CARCINOMA EXCISION Right 2009    nose, took cartilage from inner ear.    CARDIOVERSION  2019    cardioversion for atr fib    COLONOSCOPY      COLONOSCOPY N/A 3/22/2023    Procedure: COLONOSCOPY, FLEXIBLE, WITH LESION REMOVAL USING SNARE;  Surgeon: Anne Lucero MD;  Location:  GI    EP ABLATION  FOCAL AFIB N/A 2019    Procedure: EP Ablation Focal AFIB;  Surgeon: Fady Day MD;  Location:  [facility]    HERNIA REPAIR, UMBILICAL  2012    Procedure: HERNIORRHAPHY UMBILICAL;  UMBILICAL HERNIA REPAIR;  Surgeon: Ra Crocker MD;  Location: [facility]    HERNIORRHAPHY UMBILICAL  2012    Procedure: HERNIORRHAPHY UMBILICAL;  UMBILICAL HERNIA REPAIR;  Surgeon: Ra Crocker MD;  Location: [facility]    ORTHOPEDIC SURGERY      ORTHOPEDIC SURGERY      OTHER SURGICAL HISTORY Right 2017    total KNEE ARTHROSCOPY     TOTAL KNEE ARTHROPLASTY Left 2018    ZZ NONSPECIFIC PROCEDURE      achilles tendon    Z NONSPECIFIC PROCEDURE      knees, arthroscopy    ZZ NONSPECIFIC PROCEDURE      basal cell skin ca, nose    ZZ NONSPECIFIC PROCEDURE      flex sig; colonoscopy due 3/2008    Z NONSPECIFIC PROCEDURE      cardioversion for atr fib    ZZC TOTAL KNEE ARTHROPLASTY Left 2018    Dr. Malick Suarez      Allergies   Allergen Reactions    Amiodarone Shortness Of Breath     Suspected amiodarone toxicity involving lungs      Social History     Tobacco Use    Smoking status: Former     Current packs/day: 0.00     Average packs/day: 1 pack/day for 34.6 years (34.6 ttl pk-yrs)     Types: Cigarettes     Start date:      Quit date: 2011     Years since quittin.7     Passive exposure: Past    Smokeless tobacco: Never    Tobacco comments:     quit    Substance Use Topics    Alcohol use: Not Currently      Wt Readings from Last 1 Encounters:   24 136.1 kg (300 lb)        Anesthesia Evaluation   Pt has had prior anesthetic.     No history of anesthetic complications       ROS/MED HX  ENT/Pulmonary:     (+) sleep apnea,                                       Neurologic:    (-) no CVA   Cardiovascular:     (+) Dyslipidemia hypertension- -   -  - -           DELEON.             dysrhythmias, a-fib,          (-) CAD   METS/Exercise Tolerance:     Hematologic:      "  Musculoskeletal:       GI/Hepatic:    (-) GERD   Renal/Genitourinary:       Endo:     (+)               Obesity,    (-) Type II DM   Psychiatric/Substance Use:       Infectious Disease:       Malignancy:       Other:            Physical Exam    Airway        Mallampati: II   TM distance: > 3 FB   Neck ROM: full   Mouth opening: > 3 cm    Respiratory Devices and Support         Dental  no notable dental history     (+) Minor Abnormalities - some fillings, tiny chips      Cardiovascular   cardiovascular exam normal          Pulmonary   pulmonary exam normal                OUTSIDE LABS:  CBC:   Lab Results   Component Value Date    WBC 8.7 05/02/2024    WBC 12.9 (H) 02/13/2024    HGB 13.6 05/02/2024    HGB 15.2 02/13/2024    HCT 40.3 05/02/2024    HCT 45.6 02/13/2024     05/02/2024     02/13/2024     BMP:   Lab Results   Component Value Date     04/12/2024     03/28/2024    POTASSIUM 3.5 04/12/2024    POTASSIUM 3.3 (L) 03/28/2024    CHLORIDE 101 04/12/2024    CHLORIDE 100 03/28/2024    CO2 24 04/12/2024    CO2 29 03/28/2024    BUN 23.5 (H) 04/12/2024    BUN 15.1 03/28/2024    CR 1.31 (H) 04/12/2024    CR 1.25 (H) 03/28/2024     (H) 04/12/2024     (H) 03/28/2024     COAGS:   Lab Results   Component Value Date    INR 2.10 (H) 11/26/2023     POC: No results found for: \"BGM\", \"HCG\", \"HCGS\"  HEPATIC:   Lab Results   Component Value Date    ALBUMIN 3.9 02/11/2024    PROTTOTAL 6.5 02/11/2024    ALT 37 02/11/2024    AST 28 02/11/2024    ALKPHOS 53 02/11/2024    BILITOTAL 0.3 02/11/2024     OTHER:   Lab Results   Component Value Date    LACT 0.6 11/25/2023    A1C 5.7 (H) 02/25/2019    ASHELY 9.3 04/12/2024    PHOS 3.1 09/21/2020    MAG 2.2 02/13/2024    TSH 2.67 11/24/2023    T4 1.06 10/18/2022    CRP <2.9 01/08/2021    SED 45 (H) 11/28/2023       Anesthesia Plan    ASA Status:  3    NPO Status:  NPO Appropriate    Anesthesia Type: General.     - Airway: ETT   Induction: Propofol, " "Intravenous.   Maintenance: Balanced.   Techniques and Equipment:     - Airway: Video-Laryngoscope       Consents    Anesthesia Plan(s) and associated risks, benefits, and realistic alternatives discussed. Questions answered and patient/representative(s) expressed understanding.     - Discussed:     - Discussed with:  Patient            Postoperative Care    Pain management: Multi-modal analgesia.   PONV prophylaxis: Ondansetron (or other 5HT-3), Dexamethasone or Solumedrol     Comments:               Lubna Dong MD, MD    I have reviewed the pertinent notes and labs in the chart from the past 30 days and (re)examined the patient.  Any updates or changes from those notes are reflected in this note.            # Drug Induced Coagulation Defect: home medication list includes an anticoagulant medication   # Severe Obesity: Estimated body mass index is 46.99 kg/m  as calculated from the following:    Height as of this encounter: 1.702 m (5' 7\").    Weight as of this encounter: 136.1 kg (300 lb).      "

## 2024-05-02 NOTE — DISCHARGE INSTRUCTIONS
A Fib Ablation Discharge Instructions    After you go home:  Have an adult stay with you until tomorrow.  You may eat your normal diet, unless your doctor tells you otherwise.  RELAX and take it easy for 3 days.        For 24-48 hours (due to the sedation you received):  DO NOT DRIVE FOR 2 DAYS!   Do NOT make any important or legal decisions.  Do NOT drive or operate machines at home or at work.  Do NOT drink alcohol.    Care of Puncture Site:  Check the puncture site severy 1-2 hours while awake.  For 2-3 days, when you cough, sneeze, laugh or move your bowels, hold your hand over the puncture sites and press firmly.  Please remove Dressing after 24 hours.  Then apply a band aid daily for at least 3 days (if needed). If there is minor oozing, apply another band aid and remove it after 12 hours.   It is normal to have a bruising or a small lump that is present under the skin . This will go away on its own after 3-4 weeks.   You may shower. Do NOT take a bath, or use a hot tub or pool until groin site heals, which may take up to a week.  Do NOT scrub the site. Do NOT use lotion or powder near the puncture site.    Activity:  NO bending, stooping over or squatting  for 3 days  Do NOT lift, push or pull more than 10 pounds (equal to a gallon of milk) for 3 days.  NO repetitive motions such as loading , vacuuming, raking, shoveling,   Limit going up and down the stairs repetitively, for the first 24 hours after procedure.     Bleeding:  If you start bleeding from the groin site, lie down flat and press firmly on the site for 10 minutes or until bleeding stops.   Once bleeding stops, lay flat for 1-2 hours.  Call your A Fib nurse if bleeding does not stop or after hours will need to go to ER.       Go to ER or Call 911 right away if you have heavy bleeding or bleeding that does not stop.    Medications:  Take your medications, including blood thinners, unless your doctor tells you not to.  If you have stopped  any other medicines, check with your nurse or provider about when to restart them.  If you have PAIN or a TIGHTNESS in your chest, you MAY take Tylenol (acetaminophen) and if this does not help, you MAY take Advil (ibuprofen-400 mg with food).  If you have constipation or prone to constipation,  take a fiber supplement, ie metamucil or stool softners.    Call the A Fib RN if:  Chest pain not relieved by acetaminophen or ibuprofen  Difficulty swallowing and/or coughing up blood  Shortness of breath  Increased groin pain or a large or growing hard lump around the site.  Groin site is red, swollen, hot or tender.  Blood or fluid is draining from the groin site.  You have chills or a fever greater than 101 F (38 C).  Your leg feels numb, cool or changes color.  If groin pain is not relieved by Tylenol or Ibuprofen.  Recurrent irregular or fast heart rate lasting over 2-3 hours.  Any questions or concerns.    Heart rhythms:  You may have some irregular heartbeats. These feel very strong. They may make you feel that the A Fib is going to start again.  Give it time. The irregular beats should occur less often.    Follow Up Appointments:    May 13th at 2:30 pm with Crystal Campos PA-C   September 10th at 4:15 with Dr Barb LIMON [facility]   A Fib clinic RN's Mervat Hidalgo [phone] (Mon-Fri, 8:00-4:30)   [phone] Option 2 (7 days a week) after hours for on call Cardiologist.

## 2024-05-02 NOTE — ANESTHESIA POSTPROCEDURE EVALUATION
Patient: Mike Schultz    Procedure: Procedure(s):  Ablation Atrial Fibrilation       Anesthesia Type:  General    Note:     Postop Pain Control: Uneventful            Sign Out: Well controlled pain   PONV: No   Neuro/Psych: Uneventful            Sign Out: Acceptable/Baseline neuro status   Airway/Respiratory: Uneventful            Sign Out: Acceptable/Baseline resp. status   CV/Hemodynamics: Uneventful            Sign Out: Acceptable CV status; No obvious hypovolemia; No obvious fluid overload   Other NRE:    DID A NON-ROUTINE EVENT OCCUR? No           Last vitals:  Vitals Value Taken Time   /63 05/02/24 1120   Temp     Pulse 80 05/02/24 1120   Resp 0 05/02/24 1120   SpO2 94 % 05/02/24 1120   Vitals shown include unfiled device data.    Electronically Signed By: Lubna Dong MD, MD  May 2, 2024  11:28 AM

## 2024-05-02 NOTE — PROGRESS NOTES
Care Suites Discharge Nursing Note    Patient Information  Name: Mike Schultz  Age: 73 year old    Discharge Education:  Discharge instructions reviewed: Yes  Additional education/resources provided: medication given to pt- prilosec  Patient/patient representative verbalizes understanding: Yes  Patient discharging on new medications: Yes  Medication education completed: Yes    Discharge Plans:   Discharge location: home  Discharge ride contacted: Yes  Approximate discharge time: 1240    Discharge Criteria:  Discharge criteria met and vital signs stable: Yes    Patient Belongs:  Patient belongings returned to patient: Yes    Maria De Jesus Vu RN

## 2024-05-03 ENCOUNTER — MYC MEDICAL ADVICE (OUTPATIENT)
Dept: CARDIOLOGY | Facility: CLINIC | Age: 74
End: 2024-05-03
Payer: COMMERCIAL

## 2024-05-03 ENCOUNTER — TELEPHONE (OUTPATIENT)
Dept: CARDIOLOGY | Facility: CLINIC | Age: 74
End: 2024-05-03
Payer: COMMERCIAL

## 2024-05-03 NOTE — TELEPHONE ENCOUNTER
Spoke to patient in follow up to afib ablation addition to the my chart message sent by patient.   Discussed O2 sats being in the 80's.  Currently while on the phone patient checked his sats and they were 86%.  He does have SOB with activity.  Denies CP.  Reports normally is in the low 90's.  Not sure why the sats have dropped since the ablation.  He removed the dressing from the groin site this am and no bleeding noted.  Eating, drinking and gong to the bathroom without difficulty.  Patient has not questions regarding the discharge instructions and follow up appt.  Informed patient that I will update Crystal on O2 sats.  PRABHA Palomo

## 2024-05-04 ENCOUNTER — DOCUMENTATION ONLY (OUTPATIENT)
Dept: CARDIOLOGY | Facility: CLINIC | Age: 74
End: 2024-05-04
Payer: COMMERCIAL

## 2024-05-04 NOTE — PROGRESS NOTES
Cardiology On Call Note    I was contacted by the patient via telephone today as the on-call cardiologist.  is a 73 year old male who regarding feeling progressive short of breath post atrial fibrillation ablation. He had  redo PVI, posterior wall and CTI with Dr. Day on 5/2. However, over the last few days has been progressively out of breath. Minimal exertion cause SOB and he has checked his O2 sats and they have been in high 80%s; normally run in the 90s. He notes his weight has been progressively gone up to 300 lbs (was around 280 lbs in 1/24). He takes torsemide 80 mg qAM, 40 mg PM. Denies other CV symptoms.    Recommendations:  -Increase torsemide to 40 mg BID for 2 days and call office on Monday. Continue other cardiac meds.  -Discussed differential is broad and if progressive SOB over the weekend would go to ER for labs, chest imaging and TTE to rule out effusion  -Will forward to EP team to discuss next steps.  -He's     Landon Lee MD  Cardiology  [facility]  5/4/2024         CAD (coronary artery disease)

## 2024-05-05 ENCOUNTER — APPOINTMENT (OUTPATIENT)
Dept: GENERAL RADIOLOGY | Facility: CLINIC | Age: 74
DRG: 273 | End: 2024-05-05
Payer: COMMERCIAL

## 2024-05-05 ENCOUNTER — HOSPITAL ENCOUNTER (INPATIENT)
Facility: CLINIC | Age: 74
LOS: 11 days | Discharge: HOME | DRG: 273 | End: 2024-05-16
Attending: EMERGENCY MEDICINE | Admitting: INTERNAL MEDICINE
Payer: COMMERCIAL

## 2024-05-05 DIAGNOSIS — I48.21 ATRIAL FIBRILLATION, PERMANENT (H): ICD-10-CM

## 2024-05-05 DIAGNOSIS — I42.8 OTHER CARDIOMYOPATHIES (H): Primary | ICD-10-CM

## 2024-05-05 DIAGNOSIS — I50.9 ACUTE ON CHRONIC CONGESTIVE HEART FAILURE, UNSPECIFIED HEART FAILURE TYPE (H): ICD-10-CM

## 2024-05-05 DIAGNOSIS — I10 ESSENTIAL HYPERTENSION: ICD-10-CM

## 2024-05-05 DIAGNOSIS — J96.01 ACUTE RESPIRATORY FAILURE WITH HYPOXIA (H): ICD-10-CM

## 2024-05-05 DIAGNOSIS — J98.4 PNEUMONITIS: ICD-10-CM

## 2024-05-05 DIAGNOSIS — I35.0 NONRHEUMATIC AORTIC VALVE STENOSIS: ICD-10-CM

## 2024-05-05 LAB
ALBUMIN SERPL BCG-MCNC: 3.6 G/DL (ref 3.5–5.2)
ALP SERPL-CCNC: 92 U/L (ref 40–150)
ALT SERPL W P-5'-P-CCNC: 40 U/L (ref 0–70)
ANION GAP SERPL CALCULATED.3IONS-SCNC: 13 MMOL/L (ref 7–15)
AST SERPL W P-5'-P-CCNC: 34 U/L (ref 0–45)
BASE EXCESS BLDV CALC-SCNC: 7 MMOL/L (ref -3–3)
BASOPHILS # BLD AUTO: 0 10E3/UL (ref 0–0.2)
BASOPHILS NFR BLD AUTO: 0 %
BILIRUB SERPL-MCNC: 1.3 MG/DL
BUN SERPL-MCNC: 24.7 MG/DL (ref 8–23)
CALCIUM SERPL-MCNC: 8.3 MG/DL (ref 8.8–10.2)
CHLORIDE SERPL-SCNC: 98 MMOL/L (ref 98–107)
CREAT SERPL-MCNC: 1.1 MG/DL (ref 0.67–1.17)
DEPRECATED HCO3 PLAS-SCNC: 28 MMOL/L (ref 22–29)
EGFRCR SERPLBLD CKD-EPI 2021: 71 ML/MIN/1.73M2
EOSINOPHIL # BLD AUTO: 0.3 10E3/UL (ref 0–0.7)
EOSINOPHIL NFR BLD AUTO: 2 %
ERYTHROCYTE [DISTWIDTH] IN BLOOD BY AUTOMATED COUNT: 14.2 % (ref 10–15)
FLUAV RNA SPEC QL NAA+PROBE: NEGATIVE
FLUBV RNA RESP QL NAA+PROBE: NEGATIVE
GLUCOSE SERPL-MCNC: 129 MG/DL (ref 70–99)
HCO3 BLDV-SCNC: 32 MMOL/L (ref 21–28)
HCT VFR BLD AUTO: 41.5 % (ref 40–53)
HGB BLD-MCNC: 13.7 G/DL (ref 13.3–17.7)
HOLD SPECIMEN: NORMAL
IMM GRANULOCYTES # BLD: 0.1 10E3/UL
IMM GRANULOCYTES NFR BLD: 1 %
LACTATE BLD-SCNC: 1.6 MMOL/L
LYMPHOCYTES # BLD AUTO: 2.1 10E3/UL (ref 0.8–5.3)
LYMPHOCYTES NFR BLD AUTO: 19 %
MCH RBC QN AUTO: 33 PG (ref 26.5–33)
MCHC RBC AUTO-ENTMCNC: 33 G/DL (ref 31.5–36.5)
MCV RBC AUTO: 100 FL (ref 78–100)
MONOCYTES # BLD AUTO: 1.1 10E3/UL (ref 0–1.3)
MONOCYTES NFR BLD AUTO: 10 %
NEUTROPHILS # BLD AUTO: 7.6 10E3/UL (ref 1.6–8.3)
NEUTROPHILS NFR BLD AUTO: 68 %
NRBC # BLD AUTO: 0 10E3/UL
NRBC BLD AUTO-RTO: 0 /100
NT-PROBNP SERPL-MCNC: 1202 PG/ML (ref 0–900)
PCO2 BLDV: 45 MM HG (ref 40–50)
PH BLDV: 7.46 [PH] (ref 7.32–7.43)
PLATELET # BLD AUTO: 261 10E3/UL (ref 150–450)
PO2 BLDV: 24 MM HG (ref 25–47)
POTASSIUM SERPL-SCNC: 3.6 MMOL/L (ref 3.4–5.3)
PROT SERPL-MCNC: 7 G/DL (ref 6.4–8.3)
RBC # BLD AUTO: 4.15 10E6/UL (ref 4.4–5.9)
RSV RNA SPEC NAA+PROBE: NEGATIVE
SAO2 % BLDV: 44 % (ref 70–75)
SARS-COV-2 RNA RESP QL NAA+PROBE: NEGATIVE
SODIUM SERPL-SCNC: 139 MMOL/L (ref 135–145)
TROPONIN T SERPL HS-MCNC: 85 NG/L
TROPONIN T SERPL HS-MCNC: 90 NG/L
WBC # BLD AUTO: 11.1 10E3/UL (ref 4–11)

## 2024-05-05 PROCEDURE — 96374 THER/PROPH/DIAG INJ IV PUSH: CPT

## 2024-05-05 PROCEDURE — 84484 ASSAY OF TROPONIN QUANT: CPT | Performed by: INTERNAL MEDICINE

## 2024-05-05 PROCEDURE — 84484 ASSAY OF TROPONIN QUANT: CPT

## 2024-05-05 PROCEDURE — 82803 BLOOD GASES ANY COMBINATION: CPT

## 2024-05-05 PROCEDURE — 36415 COLL VENOUS BLD VENIPUNCTURE: CPT

## 2024-05-05 PROCEDURE — 120N000001 HC R&B MED SURG/OB

## 2024-05-05 PROCEDURE — 99223 1ST HOSP IP/OBS HIGH 75: CPT | Performed by: INTERNAL MEDICINE

## 2024-05-05 PROCEDURE — 93308 TTE F-UP OR LMTD: CPT

## 2024-05-05 PROCEDURE — 83880 ASSAY OF NATRIURETIC PEPTIDE: CPT

## 2024-05-05 PROCEDURE — 250N000013 HC RX MED GY IP 250 OP 250 PS 637: Performed by: INTERNAL MEDICINE

## 2024-05-05 PROCEDURE — 93005 ELECTROCARDIOGRAM TRACING: CPT

## 2024-05-05 PROCEDURE — 85025 COMPLETE CBC W/AUTO DIFF WBC: CPT

## 2024-05-05 PROCEDURE — 250N000011 HC RX IP 250 OP 636: Performed by: INTERNAL MEDICINE

## 2024-05-05 PROCEDURE — 250N000011 HC RX IP 250 OP 636

## 2024-05-05 PROCEDURE — 87637 SARSCOV2&INF A&B&RSV AMP PRB: CPT

## 2024-05-05 PROCEDURE — 36415 COLL VENOUS BLD VENIPUNCTURE: CPT | Performed by: INTERNAL MEDICINE

## 2024-05-05 PROCEDURE — 80053 COMPREHEN METABOLIC PANEL: CPT

## 2024-05-05 PROCEDURE — 99285 EMERGENCY DEPT VISIT HI MDM: CPT | Mod: 25

## 2024-05-05 PROCEDURE — 71046 X-RAY EXAM CHEST 2 VIEWS: CPT

## 2024-05-05 RX ORDER — LOSARTAN POTASSIUM 25 MG/1
50 TABLET ORAL DAILY
Status: DISCONTINUED | OUTPATIENT
Start: 2024-05-06 | End: 2024-05-16 | Stop reason: HOSPADM

## 2024-05-05 RX ORDER — ONDANSETRON 2 MG/ML
4 INJECTION INTRAMUSCULAR; INTRAVENOUS EVERY 6 HOURS PRN
Status: DISCONTINUED | OUTPATIENT
Start: 2024-05-05 | End: 2024-05-16 | Stop reason: HOSPADM

## 2024-05-05 RX ORDER — ONDANSETRON 4 MG/1
4 TABLET, ORALLY DISINTEGRATING ORAL EVERY 6 HOURS PRN
Status: DISCONTINUED | OUTPATIENT
Start: 2024-05-05 | End: 2024-05-16 | Stop reason: HOSPADM

## 2024-05-05 RX ORDER — ALLOPURINOL 300 MG/1
300 TABLET ORAL DAILY
Status: DISCONTINUED | OUTPATIENT
Start: 2024-05-06 | End: 2024-05-16 | Stop reason: HOSPADM

## 2024-05-05 RX ORDER — ACETAMINOPHEN 650 MG/1
650 SUPPOSITORY RECTAL EVERY 4 HOURS PRN
Status: DISCONTINUED | OUTPATIENT
Start: 2024-05-05 | End: 2024-05-16 | Stop reason: HOSPADM

## 2024-05-05 RX ORDER — PANTOPRAZOLE SODIUM 40 MG/1
40 TABLET, DELAYED RELEASE ORAL
Status: DISCONTINUED | OUTPATIENT
Start: 2024-05-06 | End: 2024-05-16 | Stop reason: HOSPADM

## 2024-05-05 RX ORDER — FUROSEMIDE 10 MG/ML
80 INJECTION INTRAMUSCULAR; INTRAVENOUS ONCE
Status: COMPLETED | OUTPATIENT
Start: 2024-05-05 | End: 2024-05-05

## 2024-05-05 RX ORDER — ACETAMINOPHEN 325 MG/1
650 TABLET ORAL EVERY 4 HOURS PRN
Status: DISCONTINUED | OUTPATIENT
Start: 2024-05-05 | End: 2024-05-16 | Stop reason: HOSPADM

## 2024-05-05 RX ORDER — POTASSIUM CHLORIDE 1500 MG/1
40 TABLET, EXTENDED RELEASE ORAL 2 TIMES DAILY
Status: DISCONTINUED | OUTPATIENT
Start: 2024-05-05 | End: 2024-05-16 | Stop reason: HOSPADM

## 2024-05-05 RX ORDER — AMOXICILLIN 250 MG
1 CAPSULE ORAL 2 TIMES DAILY PRN
Status: DISCONTINUED | OUTPATIENT
Start: 2024-05-05 | End: 2024-05-16 | Stop reason: HOSPADM

## 2024-05-05 RX ORDER — FINASTERIDE 1 MG/1
1 TABLET, FILM COATED ORAL DAILY
Status: DISCONTINUED | OUTPATIENT
Start: 2024-05-06 | End: 2024-05-16 | Stop reason: HOSPADM

## 2024-05-05 RX ORDER — AMOXICILLIN 250 MG
2 CAPSULE ORAL 2 TIMES DAILY PRN
Status: DISCONTINUED | OUTPATIENT
Start: 2024-05-05 | End: 2024-05-16 | Stop reason: HOSPADM

## 2024-05-05 RX ORDER — FUROSEMIDE 10 MG/ML
40 INJECTION INTRAMUSCULAR; INTRAVENOUS 2 TIMES DAILY
Status: DISCONTINUED | OUTPATIENT
Start: 2024-05-05 | End: 2024-05-08

## 2024-05-05 RX ORDER — FUROSEMIDE 10 MG/ML
60 INJECTION INTRAMUSCULAR; INTRAVENOUS 2 TIMES DAILY
Status: DISCONTINUED | OUTPATIENT
Start: 2024-05-05 | End: 2024-05-05

## 2024-05-05 RX ORDER — LIDOCAINE 40 MG/G
CREAM TOPICAL
Status: DISCONTINUED | OUTPATIENT
Start: 2024-05-05 | End: 2024-05-16 | Stop reason: HOSPADM

## 2024-05-05 RX ORDER — METOPROLOL TARTRATE 25 MG/1
25 TABLET, FILM COATED ORAL 2 TIMES DAILY
Status: DISCONTINUED | OUTPATIENT
Start: 2024-05-05 | End: 2024-05-15

## 2024-05-05 RX ADMIN — METOPROLOL TARTRATE 25 MG: 25 TABLET, FILM COATED ORAL at 20:12

## 2024-05-05 RX ADMIN — RIVAROXABAN 20 MG: 20 TABLET, FILM COATED ORAL at 17:21

## 2024-05-05 RX ADMIN — FUROSEMIDE 80 MG: 10 INJECTION, SOLUTION INTRAMUSCULAR; INTRAVENOUS at 10:56

## 2024-05-05 RX ADMIN — FUROSEMIDE 40 MG: 10 INJECTION, SOLUTION INTRAMUSCULAR; INTRAVENOUS at 17:20

## 2024-05-05 RX ADMIN — POTASSIUM CHLORIDE 40 MEQ: 1500 TABLET, EXTENDED RELEASE ORAL at 17:21

## 2024-05-05 ASSESSMENT — ACTIVITIES OF DAILY LIVING (ADL)
ADLS_ACUITY_SCORE: 39
ADLS_ACUITY_SCORE: 38
ADLS_ACUITY_SCORE: 38
ADLS_ACUITY_SCORE: 39
ADLS_ACUITY_SCORE: 39
ADLS_ACUITY_SCORE: 40
ADLS_ACUITY_SCORE: 38
ADLS_ACUITY_SCORE: 38
ADLS_ACUITY_SCORE: 39
ADLS_ACUITY_SCORE: 40
ADLS_ACUITY_SCORE: 39
ADLS_ACUITY_SCORE: 40
ADLS_ACUITY_SCORE: 40
ADLS_ACUITY_SCORE: 38

## 2024-05-05 ASSESSMENT — COLUMBIA-SUICIDE SEVERITY RATING SCALE - C-SSRS
6. HAVE YOU EVER DONE ANYTHING, STARTED TO DO ANYTHING, OR PREPARED TO DO ANYTHING TO END YOUR LIFE?: NO
1. IN THE PAST MONTH, HAVE YOU WISHED YOU WERE DEAD OR WISHED YOU COULD GO TO SLEEP AND NOT WAKE UP?: NO
2. HAVE YOU ACTUALLY HAD ANY THOUGHTS OF KILLING YOURSELF IN THE PAST MONTH?: NO

## 2024-05-05 NOTE — CONSULTS
Consult received for 2g Na diet teaching per CHF admission order set. Await ECHO, Cardiology consults. RD will plan to provide education prior to discharge if indicated.

## 2024-05-05 NOTE — ED PROVIDER NOTES
"Emergency Department Attending Supervision Note  I evaluated this patient in conjunction with Jovani Dobson PA-C. I have participated in the care of the patient and personally performed key elements of the history, exam, and medical decision making.      HPI:   Mike Schultz is a 73 year old male with history of atrial fibrillation who presents with his wife for evaluation of shortness of breath. The patient states after getting an ablation done three days ago, the following day he developed shortness of breath. He states his shortness of breath is worsened by exertion. The patient then called his cardiologist who suggested he check in if his symptoms worsened and doubled his dosage of torsemide since he was up 10 lbs. Yesterday the patient reports having another episode of shortness of breath as well as going from 303 lbs to 291 lbs. He reports to the ER now because of his worsening symptoms as per his cardiologist. Denies chest pain. Denies fever, chills, and cough. Denies abdominal pain. Denies diaphoresis.     EXAM:   /80   Pulse 74   Temp 98.2  F (36.8  C) (Oral)   Resp 22   Ht 1.727 m (5' 8\")   Wt 132 kg (291 lb)   SpO2 91%   BMI 44.25 kg/m    General: Alert, appears well-developed and well-nourished. Cooperative.     In mild distress, on 2LPM O2 by NC.    HEENT:  Head:  Atraumatic  Ears:  External ears are normal  Mouth/Throat:  Oropharynx is without erythema or exudate and mucous membranes are moist.   Eyes:   Conjunctivae normal and EOM are normal. No scleral icterus.  CV:  Normal rate, regular rhythm, normal heart sounds and radial pulses are 2+ and symmetric.  No murmur.  Resp:  Breath sounds are coarse bilaterally with crackles to bases.  No wheezing.      Non-labored, no retractions or accessory muscle use  GI:  Obese.  Abdomen is soft, distended, no tenderness. No rebound or guarding.  No CVA tenderness bilaterally.  MS:  Normal range of motion. 1+ BLE pitting edema.    Normal strength " in all 4 extremities.     Back atraumatic.    No midline cervical, thoracic, or lumbar tenderness  Skin:  Warm and dry.  No rash or lesions noted.  Neuro:   Alert. Normal strength.  GCS: 15  Psych: Normal mood and affect.    ECG  ECG taken at 0957, ECG read at 0959  Sinus rhythm with premature atrial complexes  Nonspecific ST and T wave abnormality  Prolonged QT   No significant change as compared to prior, dated 5/2/24.  Rate 74 bpm. DE interval 188 ms. QRS duration 82 ms. QT/QTc 486/539 ms. P-R-T axes 84 34 81.     Labs Ordered and Resulted from Time of ED Arrival to Time of ED Departure   COMPREHENSIVE METABOLIC PANEL - Abnormal       Result Value    Sodium 139      Potassium 3.6      Carbon Dioxide (CO2) 28      Anion Gap 13      Urea Nitrogen 24.7 (*)     Creatinine 1.10      GFR Estimate 71      Calcium 8.3 (*)     Chloride 98      Glucose 129 (*)     Alkaline Phosphatase 92      AST 34      ALT 40      Protein Total 7.0      Albumin 3.6      Bilirubin Total 1.3 (*)    TROPONIN T, HIGH SENSITIVITY - Abnormal    Troponin T, High Sensitivity 90 (*)    NT PROBNP INPATIENT - Abnormal    N terminal Pro BNP Inpatient 1,202 (*)    CBC WITH PLATELETS AND DIFFERENTIAL - Abnormal    WBC Count 11.1 (*)     RBC Count 4.15 (*)     Hemoglobin 13.7      Hematocrit 41.5            MCH 33.0      MCHC 33.0      RDW 14.2      Platelet Count 261      % Neutrophils 68      % Lymphocytes 19      % Monocytes 10      % Eosinophils 2      % Basophils 0      % Immature Granulocytes 1      NRBCs per 100 WBC 0      Absolute Neutrophils 7.6      Absolute Lymphocytes 2.1      Absolute Monocytes 1.1      Absolute Eosinophils 0.3      Absolute Basophils 0.0      Absolute Immature Granulocytes 0.1      Absolute NRBCs 0.0     ISTAT GASES LACTATE VENOUS POCT - Abnormal    Lactic Acid POCT 1.6      Bicarbonate Venous POCT 32 (*)     O2 Sat, Venous POCT 44 (*)     pCO2 Venous POCT 45      pH Venous POCT 7.46 (*)     pO2 Venous POCT 24 (*)      Base Excess/Deficit (+/-) POCT 7.0 (*)    INFLUENZA A/B, RSV, & SARS-COV2 PCR - Normal    Influenza A PCR Negative      Influenza B PCR Negative      RSV PCR Negative      SARS CoV2 PCR Negative       XR Chest 2 Views   Final Result   IMPRESSION: Heart size is prominent. There is diffuse indistinctness of the pulmonary interstitium which can be seen with air inflammation or edema. There is some patchy opacity involving the retrocardiac region of the left lung base as well as the    lateral aspect of the right middle lobe, suspicious for infection. No pleural effusion or pneumothorax.         POC US ECHO LIMITED   Final Result   PROCEDURE: Point of care bedside Cardiac US   PERFORMED BY: Dr. Dale Giordano   INDICATIONS/SYMPTOM: Shortness of breath.  Concern for pericardial fluid.   PROBE: Phased array cardiac transducer.    BODY LOCATION: The US was performed in the sub-xiphoid location and/or parasternal views.   FINDINGS: No evidence of pericardial effusion.   INTERPRETATION: There was no pericardial effusion.     IMAGE DOCUMENTATION: Images were archived to the US hard drive, and archived to PACS system.               MEDICAL DECISION MAKING/ASSESSMENT AND PLAN:   Patient is a 73-year-old male with a history of atrial fibrillation status post ablation on Thursday this last week who presents with increasing weight gain and shortness of breath.  Patient was hypoxic on arrival requiring supplementary oxygen.  Bedside ultrasound did not reveal any pericardial effusion.  Chest x-ray impressive for bilateral pulmonary edema.  Patient does have bilateral lower extremity swelling and BNP is elevated concerning for CHF exacerbation in the setting of recent ablation.  Patient had doubled his torsemide dosing yesterday and had a significant volume of urine produced although still has significant fluid retention and given the hypoxia will require hospitalization for ongoing IV diuretics and supplemental oxygen needs.   Troponin elevated likely indicative of recent ablation although also possibility of demand ischemia in the setting of CHF exacerbation.  EKG shows no concerning ischemic changes comparison with historic EKGs and lower suspicion for ACS.  Will plan to continue with telemetry and serial troponin studies upon admission.  Patient was given IV diuretics in the emergency department with plans for admission for continued diuresis and cardiac monitoring.  Spoke with Dr. Lemus who agreed to admission.     DIAGNOSIS:     ICD-10-CM    1. Acute on chronic congestive heart failure, unspecified heart failure type (H)  I50.9       2. Acute respiratory failure with hypoxia (H)  J96.01           DISPOSITION:   Admitted    Scribe Disclosure:  Elvia MEDELLIN, edmund serving as a scribe at 10:22 AM on 5/5/2024 to document services personally performed by Dale Giordano MD based on my observations and the provider's statements to me.    5/5/2024  [facility] EMERGENCY DEPT       Dale Giordano MD  05/05/24 112

## 2024-05-05 NOTE — H&P
[facility]    History and Physical - Hospitalist Service       Date of Admission:  5/5/2024    Assessment & Plan      Mike Schultz is a 73 year old male with history of atrial fibrillation (recent ablation last week) who presents with his wife for evaluation of shortness of breath/edema.    HFpEF Exacerbation, partly triggered by atrial fibrillation last week  Recent atrial fibrillation with ablation:  -  Not markedly hypoxic but quite symptomatic.  Will continue on BID lasix IV.   Hold oral diuretics.  Patient reports weight improved from yesterday when he took a double dose of his oral diuretic, however still 15+ lbs above what he considers his baseline weight.  -  Check updated echo  -  Cardiac monitoring, currently sinus rhythm  -  Cardiology consult    Mildly elevated troponin, suspect demand/type II MI:  -  Will recheck/trend.  Echo as above.  Doubt ACS.    Concern for amiodarone toxicity this past winter with hypoxia/lung issues:  -  He had felt better since then before gaining weight/edema with his more recent volume issues.    Obstructive sleep apnea:  -CPAP as per home regimen.    Gout:  -Continue prior to admission allopurinol     Obesity:  -  Complicates care         Medical Decision Making       Over 75 MINUTES SPENT BY ME on the date of service doing chart review, history, exam, documentation & further activities per the note.        PPE Used:  Mask  Diet: Combination Diet 2 gm NA Diet; No Caffeine Diet (and additional linked orders)  Fluid restriction 1800 ML FLUID (and additional linked orders)    DVT Prophylaxis: DOAC  Palmer Catheter: Not present  Lines: None     Cardiac Monitoring: None  Code Status:  Full Code    Clinically Significant Risk Factors Present on Admission          # Hypocalcemia: Lowest Ca = 8.3 mg/dL in last 2 days, will monitor and replace as appropriate      # Drug Induced Coagulation Defect: home medication list includes an anticoagulant medication   "  # Hypertension: Noted on problem list     # Severe Obesity: Estimated body mass index is 44.25 kg/m  as calculated from the following:    Height as of this encounter: 1.727 m (5' 8\").    Weight as of this encounter: 132 kg (291 lb).       # Financial/Environmental Concerns:           Disposition Plan      Expected Discharge Date: 05/07/2024                Medically Ready for Discharge: Anticipated in 2-4 Days    Rico Lemus DO  Hospitalist Service  [facility]  Securely message with [email or web address] (more info)  Text page via [employer] Paging/Directory   ____________________________________________________________________    Chief Complaint   Shortness of breath    History is obtained from the patient    History of Present Illness   Mike Schultz is a 73 year old male who presented on 5/5/2024 with SOB.  He had SOB the past week.  The patient states after getting an ablation he hoped would help it three days ago, he had worsening shortness of breath. The patient then called his cardiologist yesterday who suggested he check in if his symptoms worsened and doubled his dosage of torsemide since he was up 10 lbs. Yesterday the patient reports having another episode of shortness of breath as well as going from 303 lbs to 291 lbs.  He feels his dry weight is around 270 lbs.  He reports to the ER now because of his ongoing signficant SOB with it hard for him to get around his residence.  Denies chest pain. Denies fever, chills. Denies abdominal pain.  Reports compliance with medications.        Past Medical History    Past Medical History:   Diagnosis Date    (HFpEF) heart failure with preserved ejection fraction (H)     Acute gouty arthritis     Alcohol use disorder in remission     Amiodarone pulmonary toxicity     Aortic stenosis     Basal cell carcinoma     Of the nose    Bell's palsy     Herpes simplex without mention of complication     Hyperlipidemia     Hypertension 2000    Persistent atrial " fibrillation (H)     persistent, DCCV 12/2017, ablation 11/6/17    Pulmonary nodules     Sleep apnea     CPAP       Past Surgical History   Past Surgical History:   Procedure Laterality Date    ABLATION OF DYSRHYTHMIC FOCUS  2018, 04/12/2019    Procedure: EP Ablation Focal AFIB;  Surgeon: Fady Day MD;  Location:  HEART CARDIAC CATH LAB    ACHILLES TENDON SURGERY  1997    ANESTHESIA CARDIOVERSION N/A 2/26/2019    Procedure: ANESTHESIA CARDIOVERSION (CONNER);  Surgeon: GENERIC ANESTHESIA PROVIDER;  Location:  OR    ANESTHESIA CARDIOVERSION N/A 11/19/2020    Procedure: ANESTHESIA, FOR CARDIOVERSION (dr campbell);  Surgeon: GENERIC ANESTHESIA PROVIDER;  Location:  OR    ANESTHESIA CARDIOVERSION N/A 8/22/2022    Procedure: CARDIOVERSION;  Surgeon: GENERIC ANESTHESIA PROVIDER;  Location:  OR    ANESTHESIA CARDIOVERSION N/A 9/13/2023    Procedure: Anesthesia cardioversion;  Surgeon: GENERIC ANESTHESIA PROVIDER;  Location:  OR    ANESTHESIA CARDIOVERSION N/A 10/6/2023    Procedure: Anesthesia cardioversion;  Surgeon: GENERIC ANESTHESIA PROVIDER;  Location:  OR    BASAL CELL CARCINOMA EXCISION Right 2009    nose, took cartilage from inner ear.    CARDIOVERSION  2012, 2019    cardioversion for atr fib    COLONOSCOPY  2008    COLONOSCOPY N/A 3/22/2023    Procedure: COLONOSCOPY, FLEXIBLE, WITH LESION REMOVAL USING SNARE;  Surgeon: Anne Lucero MD;  Location:  GI    EP ABLATION FOCAL AFIB N/A 4/12/2019    Procedure: EP Ablation Focal AFIB;  Surgeon: Fady Day MD;  Location:  HEART CARDIAC CATH LAB    EP ABLATION FOCAL AFIB N/A 5/2/2024    Procedure: Ablation Atrial Fibrilation;  Surgeon: Fady Day MD;  Location:  HEART CARDIAC CATH LAB    HERNIA REPAIR, UMBILICAL  08/20/2012    Procedure: HERNIORRHAPHY UMBILICAL;  UMBILICAL HERNIA REPAIR;  Surgeon: Ra Crocker MD;  Location:  [facility]    HERNIORRHAPHY UMBILICAL  8/20/2012    Procedure: HERNIORRHAPHY UMBILICAL;  UMBILICAL HERNIA  REPAIR;  Surgeon: Ra Crocker MD;  Location: [facility]    ORTHOPEDIC SURGERY      ORTHOPEDIC SURGERY      OTHER SURGICAL HISTORY Right 03/2017    total KNEE ARTHROSCOPY     TOTAL KNEE ARTHROPLASTY Left 03/16/2018    [facility] NONSPECIFIC PROCEDURE  1997    achilles tendon    [facility] NONSPECIFIC PROCEDURE      knees, arthroscopy    [facility] NONSPECIFIC PROCEDURE      basal cell skin ca, nose    Z NONSPECIFIC PROCEDURE      flex sig; colonoscopy due 3/2008    Z NONSPECIFIC PROCEDURE  2012    cardioversion for atr fib    ZZC TOTAL KNEE ARTHROPLASTY Left 03/16/2018    Dr. Malick Suarez       Prior to Admission Medications   Prior to Admission Medications   Prescriptions Last Dose Informant Patient Reported? Taking?   allopurinol (ZYLOPRIM) 300 MG tablet 5/5/2024 at am  No Yes   Sig: Take 1 tablet (300 mg) by mouth daily   cetirizine (ZYRTEC) 10 MG tablet  at prn  Yes Yes   Sig: Take 10 mg by mouth as needed for allergies   finasteride (PROPECIA) 1 MG tablet 5/5/2024 at am  No Yes   Sig: Take 1 tablet (1 mg) by mouth daily   losartan (COZAAR) 100 MG tablet 5/5/2024 at am  No Yes   Sig: Take 0.5 tablets (50 mg) by mouth daily   metoprolol tartrate (LOPRESSOR) 25 MG tablet 5/5/2024 at am  No Yes   Sig: Take 1 tablet (25 mg) by mouth 2 times daily   omeprazole (PRILOSEC) 20 MG DR capsule 5/5/2024 at am  No Yes   Sig: Take 1 capsule (20 mg) by mouth daily   potassium chloride pushpa ER (KLOR-CON M20) 20 MEQ CR tablet 5/5/2024  No Yes   Sig: Take 2 tablets (40 mEq) by mouth 2 times daily   rivaroxaban ANTICOAGULANT (XARELTO ANTICOAGULANT) 20 MG TABS tablet 5/4/2024 at 2000  No Yes   Sig: Take 1 tablet (20 mg) by mouth daily (with dinner)   torsemide (DEMADEX) 20 MG tablet 5/4/2024 at 1200  No Yes   Sig: Take 4 tabs (80 mg) in AM and 2 tabs (40 mg) in PM by mouth      Facility-Administered Medications: None      Allergies   Allergies   Allergen Reactions    Amiodarone Shortness Of Breath     Suspected amiodarone toxicity involving lungs             Physical Exam   Vital Signs: Temp: 98.2  F (36.8  C) Temp src: Oral BP: 110/80 Pulse: 74   Resp: 22 SpO2: 91 % O2 Device: Nasal cannula Oxygen Delivery: 3 LPM  Weight: 291 lbs 0 oz    GEN:  Alert, oriented x 3, appears ill but comfortable.  HEENT:  Normocephalic/atraumatic, no scleral icterus, no nasal discharge, mouth moist.  CV:  Regular rate and rhythm, distant, soft systolic murmur noted.  LUNGS:  Clear to auscultation upper with faint bibasilar crackles, no wheezes/retractions.  Symmetric chest rise on inhalation noted.  ABD:  Active bowel sounds, soft, non-tender/non-distended.  No rebound/guarding/rigidity.  EXT:  +1-2 edema.  No cyanosis.  No acute joint synovitis noted.  SKIN:  Dry to touch, some stasis skin changes LE's.  No other exanthems noted in the visualized areas.  NEURO:  Moving ext well on general exam, sensation to touch grossly intact.  No new focal deficits appreciated.    Data     I have personally reviewed the following data over the past 24 hrs:    11.1 (H)  \   13.7   / 261     139 98 24.7 (H) /  129 (H)   3.6 28 1.10 \     ALT: 40 AST: 34 AP: 92 TBILI: 1.3 (H)   ALB: 3.6 TOT PROTEIN: 7.0 LIPASE: N/A     Trop: 90 (H) BNP: 1,202 (H)     Procal: N/A CRP: N/A Lactic Acid: 1.6           Recent Results (from the past 24 hour(s))   POC US ECHO LIMITED    Impression    PROCEDURE: Point of care bedside Cardiac US  PERFORMED BY: Dr. Dale Giordano  INDICATIONS/SYMPTOM: Shortness of breath.  Concern for pericardial fluid.  PROBE: Phased array cardiac transducer.   BODY LOCATION: The US was performed in the sub-xiphoid location and/or parasternal views.  FINDINGS: No evidence of pericardial effusion.  INTERPRETATION: There was no pericardial effusion.    IMAGE DOCUMENTATION: Images were archived to the US hard drive, and archived to PACS system.       XR Chest 2 Views    Narrative    EXAM: XR CHEST 2 VIEWS  LOCATION: [facility]  DATE: 05/05/2024    INDICATION:  Shortness of breath.  COMPARISON: 02/11/2024.      Impression    IMPRESSION: Heart size is prominent. There is diffuse indistinctness of the pulmonary interstitium which can be seen with air inflammation or edema. There is some patchy opacity involving the retrocardiac region of the left lung base as well as the   lateral aspect of the right middle lobe, suspicious for infection. No pleural effusion or pneumothorax.

## 2024-05-05 NOTE — PLAN OF CARE
[id number] 5/5/24  Summary:   history of atrial fibrillation (recent ablation last week) who presents with his wife for evaluation of shortness of breath/edema    Primary Diagnosis:  HFpEF Exacerbation, partly triggered by atrial fibrillation last week  Recent atrial fibrillation with ablation    Orientation: A&O xs 4  Aggression Stop Light: green  Mobility: Independent  Pain Management: denies  Diet: NA 2g 1800 fluid restrict  Bowel/Bladder: contiinent  Abnormal Lab/Assessments: elevated trops now trending down. Dr. Lemus aware. No new orders or interventions.   Drain/Device/Wound: 18g R PIV SL  Consults: Cards  D/C Day/Goals/Place:  05/07/2024               Shift Note: Pleasant  Tele SR with PACs and occasional PVCs

## 2024-05-05 NOTE — ED NOTES
"[facility]  ED Nurse Handoff Report    ED Chief complaint: Shortness of Breath      ED Diagnosis:   Final diagnoses:   None       Code Status: Full Code    Allergies:   Allergies   Allergen Reactions    Amiodarone Shortness Of Breath     Suspected amiodarone toxicity involving lungs       Patient Story: SOB  Focused Assessment:  Patient here for worsening SOB after ablation with weight gain. Will be admitted for further evaluation and treatment    Treatments and/or interventions provided: See MAR  Patient's response to treatments and/or interventions: TBD    To be done/followed up on inpatient unit:  close monitoring    Does this patient have any cognitive concerns?:  na    Activity level - Baseline/Home:  Independent  Activity Level - Current:   Independent    Patient's Preferred language: English   Needed?: No    Isolation: None  Infection: Not Applicable  Patient tested for COVID 19 prior to admission: YES  Bariatric?: No    Vital Signs:   Vitals:    05/05/24 0946 05/05/24 0949   BP: 110/80    Pulse: 74    Resp: 22    Temp: 98.2  F (36.8  C)    TempSrc: Oral    SpO2:  92%   Weight: 132 kg (291 lb)    Height: 1.727 m (5' 8\")        Cardiac Rhythm:     Was the PSS-3 completed:   Yes  What interventions are required if any?               Family Comments: family at bedside  OBS brochure/video discussed/provided to patient/family: N/A              Name of person given brochure if not patient: na              Relationship to patient: na    For the majority of the shift this patient's behavior was Green.   Behavioral interventions performed were none.    ED NURSE PHONE NUMBER: *[phone]         "

## 2024-05-05 NOTE — PLAN OF CARE
Goal Outcome Evaluation:       Patient transferred from ED @ 1411. A&O x4. O2 sat 93-95% on 5L. Up with SBA. Denies chest pain. Dyspnea on exertion. On tele. Will continue with plan of care.

## 2024-05-05 NOTE — PHARMACY-ADMISSION MEDICATION HISTORY
Pharmacy Intern Admission Medication History    Admission medication history is complete. The information provided in this note is only as accurate as the sources available at the time of the update.    Information Source(s): Patient via in-person    Pertinent Information: Patient reports that he gets Xarelto from [facility], so it does not appear in surescripts.  Patient reports taking allopurinol 1 tablet daily, last filled 90-90 10/31/23.  Patient filled Losartan 100 mg 90-90 1/12/24, but the patient reports taking 0.5 tablet daily.    Changes made to PTA medication list:  Added: None  Deleted: None  Changed: None    Allergies reviewed with patient and updates made in EHR: yes    Medication History Completed By: Calvin De Luna 5/5/2024 10:58 AM    PTA Med List   Medication Sig Last Dose    allopurinol (ZYLOPRIM) 300 MG tablet Take 1 tablet (300 mg) by mouth daily 5/5/2024 at am    cetirizine (ZYRTEC) 10 MG tablet Take 10 mg by mouth as needed for allergies  at prn    finasteride (PROPECIA) 1 MG tablet Take 1 tablet (1 mg) by mouth daily 5/5/2024 at am    losartan (COZAAR) 100 MG tablet Take 0.5 tablets (50 mg) by mouth daily 5/5/2024 at am    metoprolol tartrate (LOPRESSOR) 25 MG tablet Take 1 tablet (25 mg) by mouth 2 times daily 5/5/2024 at am    omeprazole (PRILOSEC) 20 MG DR capsule Take 1 capsule (20 mg) by mouth daily 5/5/2024 at am    potassium chloride pushpa ER (KLOR-CON M20) 20 MEQ CR tablet Take 2 tablets (40 mEq) by mouth 2 times daily 5/5/2024    rivaroxaban ANTICOAGULANT (XARELTO ANTICOAGULANT) 20 MG TABS tablet Take 1 tablet (20 mg) by mouth daily (with dinner) 5/4/2024 at 2000    torsemide (DEMADEX) 20 MG tablet Take 4 tabs (80 mg) in AM and 2 tabs (40 mg) in PM by mouth 5/4/2024 at 1200

## 2024-05-05 NOTE — PROGRESS NOTES
RECEIVING UNIT ED HANDOFF REVIEW    ED Nurse Handoff Report was reviewed by: Jodi Kline RN on May 5, 2024 at 1:05 PM

## 2024-05-05 NOTE — ED PROVIDER NOTES
"  Emergency Department Note      History of Present Illness     Chief Complaint  Shortness of Breath    HPI  Mike Schultz is a 73 year old male with history of atrial fibrillation, a recent cardiac ablation for atrial fibrillation, aortic stenosis who presents with complaint of shortness of breath.  Patient states he had the cardiac lesion last Thursday, felt well afterwards.  The next day, on Friday, he started to experience exertional dyspnea.  He called his cardiologist, was told to double his torsemide dose.  His weight was up 10 pounds prior to doubling the dose, he has now lost 12 pounds.  The shortness of breath continue to worsen.  He denies orthopnea, fever and chills, cough, chest pain, abdominal pain, nausea/vomiting/diarrhea.  He states his bilateral lower extremities are swollen however they appear better after increasing the torsemide.    Independent Historian  None    Review of External Notes  I reviewed admission from 5/2/2024 for ablation due to atrial fibrillation.  Past Medical History   Medical History and Problem List  Prediabetes  Alcoholism  Hypertension  Hyperlipidemia  Obstructive sleep apnea  Osteoarthritis  Morbid obesity  Atrial fibrillation  Bell's palsy    Medications  Torsemide    Surgical History   Hernia repair  Total knee arthroplasty  Ablation  Blepharoplasty   Achilles tendon surgery  Colonoscopy  Cardioversion  Basal cell carcinoma     Physical Exam   Patient Vitals for the past 24 hrs:   BP Temp Temp src Pulse Resp SpO2 Height Weight   05/05/24 1109 -- -- -- -- -- 91 % -- --   05/05/24 1104 -- -- -- -- -- 92 % -- --   05/05/24 1100 -- -- -- -- -- (!) 85 % -- --   05/05/24 1059 -- -- -- -- -- (!) 84 % -- --   05/05/24 0949 -- -- -- -- -- 92 % -- --   05/05/24 0946 110/80 98.2  F (36.8  C) Oral 74 22 -- 1.727 m (5' 8\") 132 kg (291 lb)     Physical Exam  Physical Exam:  GENERAL: Warm, dry, alert, mildly increased work of breathing  HEENT: PERRLA, no scleral icterus, clear " conjunctiva, oropharynx clear  NECK: No JVD, supple without lymphadenopathy.  No stiffness or restricted range of motion  HEART: Regular rate and rhythm, no murmur or rubs  LUNGS: Lung sounds are equal, bilaterally decreased, no rales, rhonchi, or wheezing.  ABD: Soft, nontender, nondistended, no guarding, with good bowel sounds heard.  BACK: No CVAT, no obvious deformities  EXTREMITIES: Bilateral +1 pitting edema lower extremities.  Moves all extremities without difficulty.  SKIN: Warm and dry without rash or lesions.  NEUROLOGICAL: No focal deficits.  CN II-XII intact.  PSYCH: Appropriate mood and affect.   Diagnostics   Lab Results   Labs Ordered and Resulted from Time of ED Arrival to Time of ED Departure   COMPREHENSIVE METABOLIC PANEL - Abnormal       Result Value    Sodium 139      Potassium 3.6      Carbon Dioxide (CO2) 28      Anion Gap 13      Urea Nitrogen 24.7 (*)     Creatinine 1.10      GFR Estimate 71      Calcium 8.3 (*)     Chloride 98      Glucose 129 (*)     Alkaline Phosphatase 92      AST 34      ALT 40      Protein Total 7.0      Albumin 3.6      Bilirubin Total 1.3 (*)    TROPONIN T, HIGH SENSITIVITY - Abnormal    Troponin T, High Sensitivity 90 (*)    NT PROBNP INPATIENT - Abnormal    N terminal Pro BNP Inpatient 1,202 (*)    CBC WITH PLATELETS AND DIFFERENTIAL - Abnormal    WBC Count 11.1 (*)     RBC Count 4.15 (*)     Hemoglobin 13.7      Hematocrit 41.5            MCH 33.0      MCHC 33.0      RDW 14.2      Platelet Count 261      % Neutrophils 68      % Lymphocytes 19      % Monocytes 10      % Eosinophils 2      % Basophils 0      % Immature Granulocytes 1      NRBCs per 100 WBC 0      Absolute Neutrophils 7.6      Absolute Lymphocytes 2.1      Absolute Monocytes 1.1      Absolute Eosinophils 0.3      Absolute Basophils 0.0      Absolute Immature Granulocytes 0.1      Absolute NRBCs 0.0     ISTAT GASES LACTATE VENOUS POCT - Abnormal    Lactic Acid POCT 1.6      Bicarbonate Venous  POCT 32 (*)     O2 Sat, Venous POCT 44 (*)     pCO2 Venous POCT 45      pH Venous POCT 7.46 (*)     pO2 Venous POCT 24 (*)     Base Excess/Deficit (+/-) POCT 7.0 (*)    INFLUENZA A/B, RSV, & SARS-COV2 PCR - Normal    Influenza A PCR Negative      Influenza B PCR Negative      RSV PCR Negative      SARS CoV2 PCR Negative         Imaging  XR Chest 2 Views   Final Result   IMPRESSION: Heart size is prominent. There is diffuse indistinctness of the pulmonary interstitium which can be seen with air inflammation or edema. There is some patchy opacity involving the retrocardiac region of the left lung base as well as the    lateral aspect of the right middle lobe, suspicious for infection. No pleural effusion or pneumothorax.         POC US ECHO LIMITED   Final Result   PROCEDURE: Point of care bedside Cardiac US   PERFORMED BY: Dr. Dale Giordano   INDICATIONS/SYMPTOM: Shortness of breath.  Concern for pericardial fluid.   PROBE: Phased array cardiac transducer.    BODY LOCATION: The US was performed in the sub-xiphoid location and/or parasternal views.   FINDINGS: No evidence of pericardial effusion.   INTERPRETATION: There was no pericardial effusion.     IMAGE DOCUMENTATION: Images were archived to the US hard drive, and archived to PACS system.                EKG   ECG results from 05/05/24   EKG 12-lead, tracing only     Value    Systolic Blood Pressure     Diastolic Blood Pressure     Ventricular Rate 74    Atrial Rate 74    ND Interval 188    QRS Duration 82        QTc 539    P Axis 84    R AXIS 34    T Axis 81    Interpretation ECG      Sinus rhythm with Premature atrial complexes  Nonspecific ST and T wave abnormality  Prolonged QT  Abnormal ECG  When compared with ECG of 02-MAY-2024 07:06,  Sinus rhythm has replaced Atrial fibrillation  Nonspecific T wave abnormality no longer evident in Inferior leads            Independent Interpretation  Chest x-ray indistinct interstitium is consistent with pulmonary  edema, there are no infiltrates, no pneumothorax.  ED Course    Medications Administered  Medications   furosemide (LASIX) injection 80 mg (80 mg Intravenous $Given 5/5/24 1057)       Procedures  Procedures     Discussion of Management  Staffed with Dr. Giordano    Social Determinants of Health adding to complexity of care  None    ED Course  ED Course as of 05/05/24 1128   Sun May 05, 2024   0991 I evaluated and examined the patient   1030 I reevaluated the patient, no change clinical condition.   1059 I spoke with hospitalist, Dr. Lemus Patient will be admitted inpatient.     Medical Decision Making / Diagnosis   CMS Diagnoses: None    MIPS     None    MDM  Mike Schultz is a 73 year old male with history of atrial fibrillation with a recent cardiac ablation and aortic stenosis who presents with complaint of shortness of breath.  Differential includes but not limited to ACS, ACS, acute congestive heart failure, pulmonary effusion, pneumonia, and pericardial effusion among any others.  Vital signs were significant for hypoxia at presentation with SpO2 in triage of 70% on room air otherwise he was not febrile, not tachycardic, and normotensive.  Physical exam was significant for bilateral lower extremity pitting edema up to his knees.    Given his recent cardiac ablation, we were concerned for a pericardial effusion.  Bedside POC ultrasound did not demonstrate a pericardial effusion.  There is no anemia, there was a mild leukocytosis most likely reactive due to recent procedure.  There were no metabolic derangements.  Patient's BNP was elevated, most likely an acute congestive heart failure.  Patient's troponin was also elevated, 90, this is most likely a demand ischemia related to congestive heart failure and also from his very recent cardiac ablation, will obtain serial troponins.  Viral swabs were negative.    Patient clinical presentation and return endings are consistent with acute congestive heart failure.   IV lasix was ordered.  I discussed all these findings with the patient and recommended admission for IV diuresis.  Patient agreed with the plan.  I spoke with hospital medicine, Dr. Lemus, patient will be admitted inpatient, cardiac tele bed.  The patient was admitted in stable condition.    Disposition  The patient was admitted to the hospital under the care of Dr. Lemus.     ICD-10 Codes:    ICD-10-CM    1. Acute on chronic congestive heart failure, unspecified heart failure type (H)  I50.9       2. Acute respiratory failure with hypoxia (H)  J96.01            Discharge Medications  New Prescriptions    No medications on file         HAROLDO Youssef Dr., MD, Attending  [employer]       Jovani Dobson PA-C  05/17/24 [id number]

## 2024-05-05 NOTE — TELEPHONE ENCOUNTER
Pls call Brayden Monday to check in. He talked to On-Call MD over weekend and torsemide increased.    Pls get update on weight, breathing, swelling, O2 sat, etc.  His O2 sats weren't nl before the ablation, but worse with likely-fluid on board.    I have Pulmonary Rehab order otoniel'd up if he'd like it!    Reza Rojas  May 5, 2024 at 7:50 AM

## 2024-05-05 NOTE — CONSULTS
[facility]    Cardiology Consultation     Date of Admission:  5/5/2024    Assessment & Plan     This is a 73 year old year-old patient with symptomatic atrial fibrillation refractory to medical therapy (Flecainide) s/p PVI in 2017 with re-isolation of reconnected PVs in 2019, recurrent AF s/p DCCV and amiodarone, HFpEF and is s/p recent redo ablation on 5/2. He had onset of worsening SOB so he called cardiology who increased torsemide. Despite this he came to ER in profound SOB, CXR with pulm edema. EKG with NSR, no ischemic changes. Given 80 IV lasix in ER.    Recommendations:     1. Admit to medicine and start IV diuresis with lasix 40 IV bid  2. Echocardiogram to evaluate for pericardial effusion post ablation  3. Monitor on telemetry for recurrent arrhythmias   4. If any concerning findings on echocardiogram then please page cardiology   5. TN likely to be positive post ablation, can trend but do not suspect acute coronary syndrome   6. Continue PTA meds including metoprolol, losartan, and rivaroxaban    High complexity. Will follow.    Rebecca Houston MD    Primary Care Physician   Ranjan Courtney    Reason for Consult     SOB    History of Present Illness     This is a 73 year old year-old patient with symptomatic atrial fibrillation refractory to medical therapy (Flecainide) s/p PVI in 2017 with re-isolation of reconnected PVs in 2019, recurrent AF s/p DCCV and amiodarone, HFpEF and is s/p recent redo ablation on 5/2. He had onset of worsening SOB so he called cardiology. Cardiology on call recommended to seek ER if ongoing symptoms and had attempted to increase torsemide as outpatient. Patient's breathing got progressively worse and patient came to ER. Patient has not been formally admitted thus yet. Denies chest pain. Had 10 lb weight gain that improved with increased torsemide. Denies fevers, chills, ns, abdominal pain. EKG with  normal sinus rhythm. CXR with pulmonary edema.  Patient admitted to medicine.     Past Medical History   Past Medical History:   Diagnosis Date    (HFpEF) heart failure with preserved ejection fraction (H)     Acute gouty arthritis     Alcohol use disorder in remission     Amiodarone pulmonary toxicity     Aortic stenosis     Basal cell carcinoma     Of the nose    Bell's palsy     Herpes simplex without mention of complication     Hyperlipidemia     Hypertension 2000    Persistent atrial fibrillation (H)     persistent, DCCV 12/2017, ablation 11/6/17    Pulmonary nodules     Sleep apnea     CPAP       Past Surgical History   Past Surgical History:   Procedure Laterality Date    ABLATION OF DYSRHYTHMIC FOCUS  2018, 04/12/2019    Procedure: EP Ablation Focal AFIB;  Surgeon: Fady Day MD;  Location:  [facility]    ACHILLES TENDON SURGERY  1997    ANESTHESIA CARDIOVERSION N/A 2/26/2019    Procedure: ANESTHESIA CARDIOVERSION (CONNER);  Surgeon: GENERIC ANESTHESIA PROVIDER;  Location:  OR    ANESTHESIA CARDIOVERSION N/A 11/19/2020    Procedure: ANESTHESIA, FOR CARDIOVERSION (dr campbell);  Surgeon: GENERIC ANESTHESIA PROVIDER;  Location:  OR    ANESTHESIA CARDIOVERSION N/A 8/22/2022    Procedure: CARDIOVERSION;  Surgeon: GENERIC ANESTHESIA PROVIDER;  Location:  OR    ANESTHESIA CARDIOVERSION N/A 9/13/2023    Procedure: Anesthesia cardioversion;  Surgeon: GENERIC ANESTHESIA PROVIDER;  Location:  OR    ANESTHESIA CARDIOVERSION N/A 10/6/2023    Procedure: Anesthesia cardioversion;  Surgeon: GENERIC ANESTHESIA PROVIDER;  Location:  OR    BASAL CELL CARCINOMA EXCISION Right 2009    nose, took cartilage from inner ear.    CARDIOVERSION  2012, 2019    cardioversion for atr fib    COLONOSCOPY  2008    COLONOSCOPY N/A 3/22/2023    Procedure: COLONOSCOPY, FLEXIBLE, WITH LESION REMOVAL USING SNARE;  Surgeon: Anne Lucero MD;  Location:  GI    EP ABLATION FOCAL AFIB N/A 4/12/2019    Procedure: EP Ablation Focal AFIB;  Surgeon: Fady Day  MD;  Location:  [facility]    EP ABLATION FOCAL AFIB N/A 5/2/2024    Procedure: Ablation Atrial Fibrilation;  Surgeon: Fady Day MD;  Location:  HE[facility]    HERNIA REPAIR, UMBILICAL  08/20/2012    Procedure: HERNIORRHAPHY UMBILICAL;  UMBILICAL HERNIA REPAIR;  Surgeon: Ra Crocker MD;  Location: [facility]    HERNIORRHAPHY UMBILICAL  8/20/2012    Procedure: HERNIORRHAPHY UMBILICAL;  UMBILICAL HERNIA REPAIR;  Surgeon: Ra Crocker MD;  Location: [facility]    ORTHOPEDIC SURGERY      ORTHOPEDIC SURGERY      OTHER SURGICAL HISTORY Right 03/2017    total KNEE ARTHROSCOPY     TOTAL KNEE ARTHROPLASTY Left 03/16/2018    Z NONSPECIFIC PROCEDURE  1997    achilles tendon    Z NONSPECIFIC PROCEDURE      knees, arthroscopy    Z NONSPECIFIC PROCEDURE      basal cell skin ca, nose    ZC NONSPECIFIC PROCEDURE      flex sig; colonoscopy due 3/2008    Z NONSPECIFIC PROCEDURE  2012    cardioversion for atr fib    C TOTAL KNEE ARTHROPLASTY Left 03/16/2018    Dr. Malick Suarez       Prior to Admission Medications   Prior to Admission Medications   Prescriptions Last Dose Informant Patient Reported? Taking?   allopurinol (ZYLOPRIM) 300 MG tablet 5/5/2024 at am  No Yes   Sig: Take 1 tablet (300 mg) by mouth daily   cetirizine (ZYRTEC) 10 MG tablet  at prn  Yes Yes   Sig: Take 10 mg by mouth as needed for allergies   finasteride (PROPECIA) 1 MG tablet 5/5/2024 at am  No Yes   Sig: Take 1 tablet (1 mg) by mouth daily   losartan (COZAAR) 100 MG tablet 5/5/2024 at am  No Yes   Sig: Take 0.5 tablets (50 mg) by mouth daily   metoprolol tartrate (LOPRESSOR) 25 MG tablet 5/5/2024 at am  No Yes   Sig: Take 1 tablet (25 mg) by mouth 2 times daily   omeprazole (PRILOSEC) 20 MG DR capsule 5/5/2024 at am  No Yes   Sig: Take 1 capsule (20 mg) by mouth daily   potassium chloride pushpa ER (KLOR-CON M20) 20 MEQ CR tablet 5/5/2024  No Yes   Sig: Take 2 tablets (40 mEq) by mouth 2 times daily   rivaroxaban  ANTICOAGULANT (XARELTO ANTICOAGULANT) 20 MG TABS tablet 5/4/2024 at 2000  No Yes   Sig: Take 1 tablet (20 mg) by mouth daily (with dinner)   torsemide (DEMADEX) 20 MG tablet 5/4/2024 at 1200  No Yes   Sig: Take 4 tabs (80 mg) in AM and 2 tabs (40 mg) in PM by mouth      Facility-Administered Medications: None     No current facility-administered medications for this encounter.     Current Outpatient Medications   Medication Sig Dispense Refill    allopurinol (ZYLOPRIM) 300 MG tablet Take 1 tablet (300 mg) by mouth daily 90 tablet 4    cetirizine (ZYRTEC) 10 MG tablet Take 10 mg by mouth as needed for allergies      finasteride (PROPECIA) 1 MG tablet Take 1 tablet (1 mg) by mouth daily 90 tablet 4    losartan (COZAAR) 100 MG tablet Take 0.5 tablets (50 mg) by mouth daily      metoprolol tartrate (LOPRESSOR) 25 MG tablet Take 1 tablet (25 mg) by mouth 2 times daily 180 tablet 3    omeprazole (PRILOSEC) 20 MG DR capsule Take 1 capsule (20 mg) by mouth daily 30 capsule 0    potassium chloride pushpa ER (KLOR-CON M20) 20 MEQ CR tablet Take 2 tablets (40 mEq) by mouth 2 times daily 120 tablet 4    rivaroxaban ANTICOAGULANT (XARELTO ANTICOAGULANT) 20 MG TABS tablet Take 1 tablet (20 mg) by mouth daily (with dinner) 90 tablet 3    torsemide (DEMADEX) 20 MG tablet Take 4 tabs (80 mg) in AM and 2 tabs (40 mg) in PM by mouth 180 tablet 3     No current facility-administered medications for this encounter.     Current Outpatient Medications   Medication Sig Dispense Refill    allopurinol (ZYLOPRIM) 300 MG tablet Take 1 tablet (300 mg) by mouth daily 90 tablet 4    cetirizine (ZYRTEC) 10 MG tablet Take 10 mg by mouth as needed for allergies      finasteride (PROPECIA) 1 MG tablet Take 1 tablet (1 mg) by mouth daily 90 tablet 4    losartan (COZAAR) 100 MG tablet Take 0.5 tablets (50 mg) by mouth daily      metoprolol tartrate (LOPRESSOR) 25 MG tablet Take 1 tablet (25 mg) by mouth 2 times daily 180 tablet 3    omeprazole (PRILOSEC)  20 MG DR capsule Take 1 capsule (20 mg) by mouth daily 30 capsule 0    potassium chloride pushpa ER (KLOR-CON M20) 20 MEQ CR tablet Take 2 tablets (40 mEq) by mouth 2 times daily 120 tablet 4    rivaroxaban ANTICOAGULANT (XARELTO ANTICOAGULANT) 20 MG TABS tablet Take 1 tablet (20 mg) by mouth daily (with dinner) 90 tablet 3    torsemide (DEMADEX) 20 MG tablet Take 4 tabs (80 mg) in AM and 2 tabs (40 mg) in PM by mouth 180 tablet 3     Allergies   Allergies   Allergen Reactions    Amiodarone Shortness Of Breath     Suspected amiodarone toxicity involving lungs       Social History    reports that he quit smoking about 12 years ago. His smoking use included cigarettes. He started smoking about 47 years ago. He has a 34.6 pack-year smoking history. He has been exposed to tobacco smoke. He has never used smokeless tobacco. He reports that he does not currently use alcohol. He reports that he does not use drugs.    Family History   I have reviewed this patient's family history and updated it with pertinent information if needed.  Family History   Problem Relation Age of Onset    Family History Negative Mother     Heart Disease Father         decesased at 42 - MI    Heart Disease Sister          MI    Lipids Sister     Lipids Sister     No Known Problems Sister     Heart Disease Brother          MI    Heart Disease Brother         CABG AT 49    Lung Cancer No family hx of           Review of Systems   A comprehensive review of system was performed and is negative other than that noted in the HPI or here.     Physical Exam   Vital Signs with Ranges  Temp:  [98.2  F (36.8  C)] 98.2  F (36.8  C)  Pulse:  [74] 74  Resp:  [22] 22  BP: (110)/(80) 110/80  SpO2:  [84 %-92 %] 91 %  Wt Readings from Last 4 Encounters:   24 132 kg (291 lb)   24 136.1 kg (300 lb)   24 137.4 kg (303 lb)   24 135.2 kg (298 lb)     No intake/output data recorded.      Vitals: /80   Pulse 74   Temp 98.2  F  "(36.8  C) (Oral)   Resp 22   Ht 1.727 m (5' 8\")   Wt 132 kg (291 lb)   SpO2 91%   BMI 44.25 kg/m      Physical Exam:   General - Alert and oriented to time place and person in no acute distress  Eyes - No scleral icterus  HEENT - Neck supple, moist mucous membranes  Cardiovascular - rrr no mrg  Extremities - There is no edema  Respiratory - Crackles at bases  Skin - No pallor or cyanosis  Gastrointestinal - Non tender and non distended without rebound or guarding  Psych - Appropriate affect   Neurological - No gross motor neurological focal deficits    No lab results found in last 7 days.    Invalid input(s): \"TROPONINIES\"    Recent Labs   Lab 05/05/24  1003 05/02/24  0720   WBC 11.1* 8.7   HGB 13.7 13.6    98    224    141   POTASSIUM 3.6 3.7   CHLORIDE 98 106   CO2 28 24   BUN 24.7* 21.7   CR 1.10 1.13   GFRESTIMATED 71 69   ANIONGAP 13 11   ASHELY 8.3* 8.1*   * 111*   ALBUMIN 3.6  --    PROTTOTAL 7.0  --    BILITOTAL 1.3*  --    ALKPHOS 92  --    ALT 40  --    AST 34  --      Recent Labs   Lab Test 07/27/23  0955 10/06/21  1759   CHOL 163 173   HDL 39* 38*   LDL 97 103*   TRIG 133 161*     Recent Labs   Lab 05/05/24  1003 05/02/24  0720   WBC 11.1* 8.7   HGB 13.7 13.6   HCT 41.5 40.3    98    224     Recent Labs   Lab 05/05/24  1006   PHV 7.46*   PO2V 24*   PCO2V 45   HCO3V 32*     Recent Labs   Lab 05/05/24  1003   NTBNPI 1,202*     No results for input(s): \"DD\" in the last 168 hours.  No results for input(s): \"SED\", \"CRP\" in the last 168 hours.  Recent Labs   Lab 05/05/24  1003 05/02/24  0720    224     No results for input(s): \"TSH\" in the last 168 hours.  No results for input(s): \"COLOR\", \"APPEARANCE\", \"URINEGLC\", \"URINEBILI\", \"URINEKETONE\", \"SG\", \"UBLD\", \"URINEPH\", \"PROTEIN\", \"UROBILINOGEN\", \"NITRITE\", \"LEUKEST\", \"RBCU\", \"WBCU\" in the last 168 hours.    Imaging:  Recent Results (from the past 48 hour(s))   POC US ECHO LIMITED    Impression    PROCEDURE: Point " "of care bedside Cardiac US  PERFORMED BY: Dr. Dale Giordano  INDICATIONS/SYMPTOM: Shortness of breath.  Concern for pericardial fluid.  PROBE: Phased array cardiac transducer.   BODY LOCATION: The US was performed in the sub-xiphoid location and/or parasternal views.  FINDINGS: No evidence of pericardial effusion.  INTERPRETATION: There was no pericardial effusion.    IMAGE DOCUMENTATION: Images were archived to the US hard drive, and archived to PACS system.       XR Chest 2 Views    Narrative    EXAM: XR CHEST 2 VIEWS  LOCATION: [facility]  DATE: 05/05/2024    INDICATION: Shortness of breath.  COMPARISON: 02/11/2024.      Impression    IMPRESSION: Heart size is prominent. There is diffuse indistinctness of the pulmonary interstitium which can be seen with air inflammation or edema. There is some patchy opacity involving the retrocardiac region of the left lung base as well as the   lateral aspect of the right middle lobe, suspicious for infection. No pleural effusion or pneumothorax.         Echo:  No results found for this or any previous visit (from the past 4320 hour(s)).    Clinically Significant Risk Factors Present on Admission          # Hypocalcemia: Lowest Ca = 8.3 mg/dL in last 2 days, will monitor and replace as appropriate      # Drug Induced Coagulation Defect: home medication list includes an anticoagulant medication    # Hypertension: Noted on problem list  # Acute heart failure with preserved ejection fraction: heart failure noted on problem list, last echo with EF >50%, and receiving IV diuretics     # Severe Obesity: Estimated body mass index is 44.25 kg/m  as calculated from the following:    Height as of this encounter: 1.727 m (5' 8\").    Weight as of this encounter: 132 kg (291 lb).       # Financial/Environmental Concerns:          Cardiac Arrhythmia: Atrial fibrillation: Paroxysmal                 "

## 2024-05-05 NOTE — ED TRIAGE NOTES
Ablation done for A fib on Thursday, since Friday having worsening SOB from baseline with 10 lb weight gain.  Doubled up on Torsamide per cardiology recommendation and able to lose 12 lb as of yesterday.      Triage Assessment (Adult)       Row Name 05/05/24 0957          Triage Assessment    Airway WDL WDL        Respiratory WDL    Respiratory WDL X        Skin Circulation/Temperature WDL    Skin Circulation/Temperature WDL WDL        Cardiac WDL    Cardiac WDL X        Peripheral/Neurovascular WDL    Peripheral Neurovascular WDL WDL        Cognitive/Neuro/Behavioral WDL    Cognitive/Neuro/Behavioral WDL WDL

## 2024-05-06 ENCOUNTER — APPOINTMENT (OUTPATIENT)
Dept: CARDIOLOGY | Facility: CLINIC | Age: 74
DRG: 273 | End: 2024-05-06
Attending: INTERNAL MEDICINE
Payer: COMMERCIAL

## 2024-05-06 LAB
ANION GAP SERPL CALCULATED.3IONS-SCNC: 9 MMOL/L (ref 7–15)
ATRIAL RATE - MUSE: 74 BPM
BUN SERPL-MCNC: 18.6 MG/DL (ref 8–23)
CALCIUM SERPL-MCNC: 8.5 MG/DL (ref 8.8–10.2)
CHLORIDE SERPL-SCNC: 99 MMOL/L (ref 98–107)
CREAT SERPL-MCNC: 0.97 MG/DL (ref 0.67–1.17)
DEPRECATED HCO3 PLAS-SCNC: 31 MMOL/L (ref 22–29)
DIASTOLIC BLOOD PRESSURE - MUSE: NORMAL MMHG
EGFRCR SERPLBLD CKD-EPI 2021: 83 ML/MIN/1.73M2
GLUCOSE SERPL-MCNC: 119 MG/DL (ref 70–99)
INTERPRETATION ECG - MUSE: NORMAL
LVEF ECHO: NORMAL
P AXIS - MUSE: 84 DEGREES
POTASSIUM SERPL-SCNC: 3.8 MMOL/L (ref 3.4–5.3)
PR INTERVAL - MUSE: 188 MS
QRS DURATION - MUSE: 82 MS
QT - MUSE: 486 MS
QTC - MUSE: 539 MS
R AXIS - MUSE: 34 DEGREES
SODIUM SERPL-SCNC: 139 MMOL/L (ref 135–145)
SYSTOLIC BLOOD PRESSURE - MUSE: NORMAL MMHG
T AXIS - MUSE: 81 DEGREES
VENTRICULAR RATE- MUSE: 74 BPM

## 2024-05-06 PROCEDURE — 99233 SBSQ HOSP IP/OBS HIGH 50: CPT | Performed by: HOSPITALIST

## 2024-05-06 PROCEDURE — 999N000208 ECHOCARDIOGRAM COMPLETE

## 2024-05-06 PROCEDURE — 255N000002 HC RX 255 OP 636: Performed by: INTERNAL MEDICINE

## 2024-05-06 PROCEDURE — 120N000001 HC R&B MED SURG/OB

## 2024-05-06 PROCEDURE — 250N000011 HC RX IP 250 OP 636: Performed by: INTERNAL MEDICINE

## 2024-05-06 PROCEDURE — 99232 SBSQ HOSP IP/OBS MODERATE 35: CPT | Mod: 25 | Performed by: NURSE PRACTITIONER

## 2024-05-06 PROCEDURE — 80048 BASIC METABOLIC PNL TOTAL CA: CPT | Performed by: INTERNAL MEDICINE

## 2024-05-06 PROCEDURE — 250N000013 HC RX MED GY IP 250 OP 250 PS 637: Performed by: INTERNAL MEDICINE

## 2024-05-06 PROCEDURE — C8929 TTE W OR WO FOL WCON,DOPPLER: HCPCS

## 2024-05-06 PROCEDURE — 93306 TTE W/DOPPLER COMPLETE: CPT | Mod: 26 | Performed by: INTERNAL MEDICINE

## 2024-05-06 PROCEDURE — 36415 COLL VENOUS BLD VENIPUNCTURE: CPT | Performed by: INTERNAL MEDICINE

## 2024-05-06 RX ADMIN — POTASSIUM CHLORIDE 40 MEQ: 1500 TABLET, EXTENDED RELEASE ORAL at 10:08

## 2024-05-06 RX ADMIN — POTASSIUM CHLORIDE 40 MEQ: 1500 TABLET, EXTENDED RELEASE ORAL at 17:31

## 2024-05-06 RX ADMIN — METOPROLOL TARTRATE 25 MG: 25 TABLET, FILM COATED ORAL at 10:08

## 2024-05-06 RX ADMIN — HUMAN ALBUMIN MICROSPHERES AND PERFLUTREN 9 ML: 10; .22 INJECTION, SOLUTION INTRAVENOUS at 12:12

## 2024-05-06 RX ADMIN — LOSARTAN POTASSIUM 50 MG: 25 TABLET, FILM COATED ORAL at 10:08

## 2024-05-06 RX ADMIN — FUROSEMIDE 40 MG: 10 INJECTION, SOLUTION INTRAMUSCULAR; INTRAVENOUS at 17:32

## 2024-05-06 RX ADMIN — FINASTERIDE 1 MG: 1 TABLET, FILM COATED ORAL at 10:08

## 2024-05-06 RX ADMIN — PANTOPRAZOLE SODIUM 40 MG: 40 TABLET, DELAYED RELEASE ORAL at 06:41

## 2024-05-06 RX ADMIN — ALLOPURINOL 300 MG: 300 TABLET ORAL at 10:08

## 2024-05-06 RX ADMIN — FUROSEMIDE 40 MG: 10 INJECTION, SOLUTION INTRAMUSCULAR; INTRAVENOUS at 10:00

## 2024-05-06 RX ADMIN — METOPROLOL TARTRATE 25 MG: 25 TABLET, FILM COATED ORAL at 21:24

## 2024-05-06 RX ADMIN — RIVAROXABAN 20 MG: 20 TABLET, FILM COATED ORAL at 17:31

## 2024-05-06 ASSESSMENT — ACTIVITIES OF DAILY LIVING (ADL)
ADLS_ACUITY_SCORE: 22
DEPENDENT_IADLS:: INDEPENDENT
ADLS_ACUITY_SCORE: 22

## 2024-05-06 NOTE — PROGRESS NOTES
[facility]    Cardiology Progress Note    Primary Cardiologist: Dr. Day (EP)    Date of Admission: 5/5/2024  Service Date: 05/06/24    Summary:  Mr. Mike Schultz is a very pleasant 73 year old male with a past medical history of alcohol abuse, symptomatic atrial fibrillation refractory to medical therapy (Flecainide) s/p PVI in 2017 with re-isolation of reconnected PVs in 2019, recurrent AF s/p DCCV and amiodarone (discontinued with discovery of pneumonitis 11/2023), HFpEF and is s/p recent redo ablation on 5/2  who was admitted on 5/5/2024 for worsening shortness of breath. Cardiology was consulted for heart failure.    Interval History   Patient with improvement in symptoms yesterday following IV furosemide in ER and on the floor. So far, has received a total of 120mg IV.     Telemetry: Sinus rhythm with occasional PVC's, rate 70's    Assessment & Plan   Acute on chronic HFpEF   - Hospitalized in Feb 2024 with multiple winter/spring 2024 clinic visits for medication titration   - LVEF: 55-60% (2/2024)  - NYHA class III  - Fluid status: mild hypervolemia; suspected dry weight: unclear, likely ~290-295#  - Diuretic regimen: PTA torsemide 80mg in am and 40mg in pm   - Guideline directed medical therapy:  - Beta blocker: Metoprolol 25mg BID  - ACEI/ARB/ARNI: Losartan 50mg daily    - Aldactone antagonist: None, previously on 12.5mg daily, which was stopped due to worsened renal function   - SGLT2 inhibitor: None  -Chest xray on admission with pulmonary interstitium which can be seen with edema, no pleural effusion or pneumothorax    2. Recurrent atrial fibrillation s/p DCCV and amiodarone with recent redo ablation on 5/2/24 with Dr. Day  - POC echo in ER without evidence of pericardial effusion   -Anticoagulated with xarelto     3. Elevated troponin  - Flat trend 90-85, unlikely to be ACS given absence of delta rise and anginal symptoms, most likely related to being s/p ablation     4.  Moderate aortic stenosis  - Stable on February TTE, mean gradient 33.9mmHg  - If progression present, could be contributing to shortness of breath    5. Hx of alcohol abuse  - Precipitating atrial fibrillation recurrences     Plan:   1. Review complete echo results when available  2. Continue PTA medications including metoprolol, losartan, and xarelto  3. Continue furosemide 40mg IV BID, likely resume PO diuretics tomorrow   MAY NEED RHC TO DETERMINE IF FLUID STATUS CORRECT  Thank you for the opportunity to participate in this pleasant patient's care.     AYAN Manzanares, CNP   Nurse Practitioner  [facility]  Pager: [phone]  (8am - 5pm, M-F)    Patient Active Problem List   Diagnosis    Essential hypertension    Impotence of organic origin    Allergic rhinitis    Herpes simplex virus (HSV) infection    Class 3 obesity (H)    Tobacco use disorder, moderate, in sustained remission    Atrial fibrillation, permanent (H)    Advanced directives, counseling/discussion    Family history of ischemic heart disease    Gout    BOWEN (obstructive sleep apnea)    Ascending aorta dilatation (H24)    Impaired fasting glucose    Aortic valve stenosis    Venous insufficiency    Bilateral carpal tunnel syndrome    Other cardiomyopathies (H)    Leg wound, right    Lower extremity edema    Ulcer of lower limb, unspecified laterality, with fat layer exposed (H)    Acute respiratory failure with hypoxia (H)    Acute on chronic congestive heart failure, unspecified heart failure type (H)       Physical Exam   Temp: 98  F (36.7  C) Temp src: Oral BP: 115/71 Pulse: 71   Resp: 18 SpO2: 92 % O2 Device: Nasal cannula Oxygen Delivery: 5 LPM  Vitals:    05/05/24 0946 05/06/24 0600   Weight: 132 kg (291 lb) 133 kg (293 lb 3.2 oz)     Vital Signs with Ranges  Temp:  [97.6  F (36.4  C)-98.6  F (37  C)] 98  F (36.7  C)  Pulse:  [63-83] 71  Resp:  [18-20] 18  BP: (114-138)/(71-80) 115/71  SpO2:  [84 %-94 %] 92 %  I/O last  3 completed shifts:  In: 1660 [P.O.:1660]  Out: 2725 [Urine:2725]    Constitutional:  Appears his stated age, well nourished, and in no acute distress.  Eyes: Pupils equal, round. Sclerae anicteric.   HEENT: Normocephalic, atraumatic.   Neck: Supple.   Respiratory: Breathing non-labored. Lungs clear to auscultation bilaterally. No crackles, wheezes, rhonchi, or rales.  Cardiovascular: Regular rate and rhythm, normal S1 and S2. No murmur, rub, or gallop.  GI: Soft, non-distended, non-tender, bowel sounds present in all four quadrants.  Skin: Warm, dry.   Musculoskeletal/Extremities: Moves all extremities well and symmetrically. Trace bilateral LE edema  Neurologic: No gross focal deficits. Alert, awake, and oriented to person, place and time.  Psychiatric: Affect appropriate. Mentation normal.    Medications   Current Facility-Administered Medications   Medication Dose Route Frequency Provider Last Rate Last Admin    - MEDICATION INSTRUCTIONS -   Does not apply DOES NOT GO TO Rico Herring DO        Continuing ACE inhibitor/ARB/ARNI from home medication list OR ACE inhibitor/ARB/ARNI order already placed during this visit   Does not apply DOES NOT GO TO Rico Herring DO        Continuing beta blocker from home medication list OR beta blocker order already placed during this visit   Does not apply DOES NOT GO TO Rico Herring DO        Patient is already receiving anticoagulation with heparin, enoxaparin (LOVENOX), warfarin (COUMADIN)  or other anticoagulant medication   Does not apply Continuous PRN Rico Lemus, DO         Current Facility-Administered Medications   Medication Dose Route Frequency Provider Last Rate Last Admin    allopurinol (ZYLOPRIM) tablet 300 mg  300 mg Oral Daily Rico Lemus DO        finasteride (PROPECIA) tablet 1 mg  1 mg Oral Daily Rico Lemus, DO        furosemide (LASIX) injection 40 mg  40 mg Intravenous BID Rico Lemus, DO   40 mg at 05/05/24 1720     losartan (COZAAR) tablet 50 mg  50 mg Oral Daily Rico Lemus P, DO        metoprolol tartrate (LOPRESSOR) tablet 25 mg  25 mg Oral BID Rico Lemus P, DO   25 mg at 05/05/24 2012    pantoprazole (PROTONIX) EC tablet 40 mg  40 mg Oral QAM AC Trae Lemusn P, DO   40 mg at 05/06/24 0641    potassium chloride pushpa ER (KLOR-CON M20) CR tablet 40 mEq  40 mEq Oral BID Rico Lemus P, DO   40 mEq at 05/05/24 1721    rivaroxaban ANTICOAGULANT (XARELTO) tablet 20 mg  20 mg Oral Daily with supper Rico Lemus P, DO   20 mg at 05/05/24 1721    sodium chloride (PF) 0.9% PF flush 3 mL  3 mL Intracatheter Q8H Rico Lemus P, DO   3 mL at 05/06/24 0641       Data   No results found for this or any previous visit (from the past 24 hour(s)).    Recent Labs   Lab 05/05/24  1003 05/02/24  0720   WBC 11.1* 8.7   HGB 13.7 13.6   HCT 41.5 40.3    98    224     Recent Labs   Lab 05/06/24  0854 05/05/24  1003 05/02/24  0720    139 141   POTASSIUM 3.8 3.6 3.7   CHLORIDE 99 98 106   CO2 31* 28 24   ANIONGAP 9 13 11   * 129* 111*   BUN 18.6 24.7* 21.7   CR 0.97 1.10 1.13   GFRESTIMATED 83 71 69   ASHELY 8.5* 8.3* 8.1*        This note was completed in part using Dragon voice recognition software. Although reviewed after completion, some word and grammatical errors may occur.

## 2024-05-06 NOTE — PLAN OF CARE
[id number]    Orientation: A&Ox4.   Activity: Independent in room   Diet/BS Checks: 2 gram sodium with 1800 mL Fr. No caffeine   Tele:  NSR  IV Access/Drains: R PIV SL   Pain Management: denied pain  Abnormal VS/Results: VSS on 5L NC O2  Bowel/Bladder: continent of B/B. No BM this shift. Utilized urinal   Skin/Wounds: karine BLE. Scab to R shin  Consults: cardiology  D/C Disposition: pending    Other Info:   A&Ox4. VSS on 5L NC O2. Denied pain. Independent in room. Continent B/B. No BM this shift. 2 gram sodium diet with 1800 mL FR, no caffeine. R PIV SL. On tele, NSR. K protocol. Discharge pending

## 2024-05-06 NOTE — CONSULTS
REASON FOR ASSESSMENT:  CHF Consult for 2 gm NA Diet Education    NUTRITION HISTORY:  Information obtained from:  pt    Previous diet instructions:  None    Living situation:   Home w/ spouse    Grocery shopping:  Pt and spouse    Meal preparation:  Pt     Breakfast:  2 eggs w/ hash browned potato patties, and avocado    Lunch:  Not much - if has soup, wife often makes    Dinner:   Chicken breat or beef filet, w/ broccoli, and bake potato    CURRENT DIET:  2 gm Na+ diet, No caffeine, and 1800 mL fluid restriction    NUTRITION DIAGNOSIS:  Food- and nutrition-related knowledge deficit R/t no previous low sodium diet education in the past AEB pt report     INTERVENTIONS:  Nutrition Prescription:  Follow a low sodium diet upon discharge     Implementation:  Assessed learning needs, learning preferences, and willingness to learn  Nutrition Education (Content):  Provided handouts:  Tips for Low Na Diet  Label Reading  Low Na Foods/Drinks  Seasoning Your Food Without Salt  Low Na Recipe Booklet  Discussed rational for limiting Na for CHF and stressed importance of following 2 gm Na guidelines   Encouraged patient to keep a daily food record  Nutrition Education (Application):  Discussed current eating habits and recommended alternative food choices  Anticipated good compliance  Diet Education - refer to Education Flowsheet    Goals:  Pt verbalized understanding by asking clarifying questions, restating the importance of checking the serving size on food labels, & agreeing to track sodium intake to see where they can make some changes to grocery shopping & meal preparation.  All of the above goals met during the education session    Follow Up:  Provided RD contact information for future questions.  Recommend Out-Patient Nutrition Referral, if further diet instructions are needed.    Isaac Pandya RD, LD

## 2024-05-06 NOTE — PROVIDER NOTIFICATION
MD Notification    Notified Person: MD hospitalist     Notified Person Name: Dr. Vaughan     Notification Date/Time: 5/6/2024 at 0441    Notification Interaction: [employer] web messaging     Purpose of Notification: [address] FYI patient refusing to wear CPAP overnight. On 5L NC O2.     Orders Received: MD aware    Comments:

## 2024-05-06 NOTE — PROGRESS NOTES
[facility]    Medicine Progress Note - Hospitalist Service    Date of Admission:  5/5/2024    Assessment & Plan     This is a 73-year-old male with medical history which includes obesity, obstructive sleep apnea on CPAP,atrial fibrillation refractory to medical therapy (Flecainide) s/p PVI in 2017 with re-isolation of reconnected PVs in 2019, recurrent AF s/p DCCV and amiodarone, HFpEF and is s/p recent redo ablation on 5/2 who presented to the ED with shortness of breath and was diagnosed with acute hypoxic respiratory failure due to pulmonary edema    Acute on chronic heart failure with preserved ejection fraction acute hypoxic respiratory failure due to above  -Last echo in 2/2024 showed EF of 55 to 60%  -Patient has been admitted in November 2023 in February 2023 for a dyspnea and workup in November including CT scan of the chest showed widespread pneumonitis 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 and it was stopped and patient was started on 60 mg prednisone and was treated for at least 3 months and followed with pulmonary team as outpatient  -Outpatient PFTs in 12/23 showed mild restriction  -Repeat CT scan as outpatient showed subsequent improvement in his pneumonitis  -Patient again was admitted in February 2023 for dyspnea along with anasarca and was diuresed during that hospital stay    -Again presented this admission with shortness of breath and workup on admission was consistent with elevated proBNP of 1202, EKG no ST elevation and did increase the dose of diuretics at home but failed  - repeat CT scan of the chest on 3/24 showed improvement in bilateral interstitial pulmonary infiltrates with likely component of fibrosis and no suspicious pulmonary nodules  -Chest x-ray on 5/5/2024 showed diffuse indistinctness of the pulmonary interstitium which can be seen with air inflammation or edema and patient did not had any constitutional symptoms including cough or  fever and no pleural effusion or pneumothorax  -Cardiology consulted and started on IV Lasix 40 twice daily  -Echo ordered and pending results  -Clinically on exam still has bilateral lower extremity edema, minimal basilar crackles and is on 4 L of nasal cannula oxygen  -Reviewed intake and output and he is -1 L  -Continue with IV diuresis and continue to monitor intake and output and labs    Recurrent atrial fibrillation s/p DCCV and amiodarone with recent redo ablation on 5/2/24 with Dr. Day   -Continue with Xarelto and metoprolol 25 twice daily  -Repeat echo pending  -Continue to monitor on telemetry       Mildly elevated troponin, suspect demand/type II MI:  -Initial troponin was 90 and repeat was 85  -No evidence of any chest discomfort  -EKG on admit did not show any ST elevation  -Echo ordered and pending result  -Continue to monitor on telemetry     History of prolonged QT interval  -He does have QT interval which runs on the higher side and EKG on admission was reviewed    Concern for amiodarone toxicity this past winter with hypoxia/lung issues  -  He had felt better since then before gaining weight/edema with his more recent volume issues.     Obstructive sleep apnea:  -CPAP as per home regimen and encourage patient to be compliant     Gout:  -Continue pPTA  allopurinol              Diet: Combination Diet 2 gm NA Diet; No Caffeine Diet (and additional linked orders)  Fluid restriction 1800 ML FLUID (and additional linked orders)    DVT Prophylaxis: DOAC  Palmer Catheter: Not present  Lines: None     Cardiac Monitoring: ACTIVE order. Indication: Acute decompensated heart failure (48 hours)  Code Status: Full Code      Clinically Significant Risk Factors Present on Admission          # Hypocalcemia: Lowest Ca = 8.3 mg/dL in last 2 days, will monitor and replace as appropriate      # Drug Induced Coagulation Defect: home medication list includes an anticoagulant medication    # Hypertension: Noted on problem  "list  # Acute heart failure with preserved ejection fraction: heart failure noted on problem list, last echo with EF >50%, and receiving IV diuretics     # Severe Obesity: Estimated body mass index is 44.58 kg/m  as calculated from the following:    Height as of this encounter: 1.727 m (5' 8\").    Weight as of this encounter: 133 kg (293 lb 3.2 oz).       # Financial/Environmental Concerns:           Disposition Plan     Medically Ready for Discharge: Anticipated in 2-4 Days             Tawny Alcantar MD  Hospitalist Service  [facility]  Securely message with Yext (more info)  Text page via [facility] Paging/Directory   ______________________________________________________________________    Interval History     -Had a long discussion with the patient and he has been having ongoing shortness of breath for months and has seen pulmonary in the past  -As per patient he has completed 3 to 4 months of prednisone and followed with pulmonary as outpatient and breathing improved  -Before his ablation his sats at baseline were around 92  -Sats with exertion did go down to 60s but will recover and uses oxygen during nighttime with CPAP only  -After ablation the symptoms got worse and and that is why he came to the ER    Physical Exam   Vital Signs: Temp: 98  F (36.7  C) Temp src: Oral BP: 115/71 Pulse: 71   Resp: 18 SpO2: 92 % O2 Device: Nasal cannula Oxygen Delivery: 5 LPM  Weight: 293 lbs 3.2 oz        General: AOx3  HEENT: Head is atraumatic, normocephalic.    Neck: Neck is supple  Respiratory: CTLA and minimal basilar crackles and is on nasal cannula oxygen  Cardiovascular: Regular rate , S1 and S2 normal with no murmer or rubs or gallops and has edema over both lower extremity  Abdomen:   soft , non tender , non distended and bowel sound present   Skin: No skin rashes or lesions to inspection or palpation.  Neurologic: No focal deficit  Musculoskeletal: Normal Range of motion over upper and lower " extremities bilaterally   Psychiatric: cooperative     Medical Decision Making       Time spent in care of patient is 50 minutes and I reviewed labs including CBC, troponin, chest x-ray, intake and output, cardiology note and discussed plan of care in detail with the patient and the nursing staff and his questions were answered to satisfaction      Data     I have personally reviewed the following data over the past 24 hrs:    N/A  \   N/A   / N/A     139 99 18.6 /  119 (H)   3.8 31 (H) 0.97 \     ALT: N/A AST: N/A AP: N/A TBILI: N/A   ALB: N/A TOT PROTEIN: N/A LIPASE: N/A     Trop: 85 (H) BNP: N/A     Procal: N/A CRP: N/A Lactic Acid: N/A         Imaging results reviewed over the past 24 hrs:   Recent Results (from the past 24 hour(s))   XR Chest 2 Views    Narrative    EXAM: XR CHEST 2 VIEWS  LOCATION: [facility]  DATE: 05/05/2024    INDICATION: Shortness of breath.  COMPARISON: 02/11/2024.      Impression    IMPRESSION: Heart size is prominent. There is diffuse indistinctness of the pulmonary interstitium which can be seen with air inflammation or edema. There is some patchy opacity involving the retrocardiac region of the left lung base as well as the   lateral aspect of the right middle lobe, suspicious for infection. No pleural effusion or pneumothorax.

## 2024-05-06 NOTE — CONSULTS
Care Management Initial Consult    General Information  Assessment completed with: Patient, VM-chart review, Spouse or significant other, Albertina  Type of CM/SW Visit: Initial Assessment    Primary Care Provider verified and updated as needed: Yes   Readmission within the last 30 days: no previous admission in last 30 days      Reason for Consult: discharge planning (for CHF)  Advance Care Planning: Advance Care Planning Reviewed: no concerns identified, verified with patient, questions answered (forms provided)          Communication Assessment  Patient's communication style: spoken language (English or Bilingual)    Hearing Difficulty or Deaf: no   Wear Glasses or Blind: no    Cognitive  Cognitive/Neuro/Behavioral: WDL                      Living Environment:   People in home: significant other     Current living Arrangements: house      Able to return to prior arrangements: yes       Family/Social Support:  Care provided by: self  Provides care for: no one  Marital Status: Lives with Significant Other  Significant Other       Katty  Description of Support System: Supportive, Involved         Current Resources:   Patient receiving home care services: No     Community Resources: None  Equipment currently used at home: none  Supplies currently used at home: Oxygen Tubing/Supplies, Other (bleeding oxygen into CPAP, uses O2 at night, [facility] for Oxygen)    Employment/Financial:  Employment Status: employed full-time        Financial Concerns: none   Referral to Financial Worker: No       Does the patient's insurance plan have a 3 day qualifying hospital stay waiver?  Yes     Which insurance plan 3 day waiver is available? Alternative insurance waiver    Will the waiver be used for post-acute placement? No    Lifestyle & Psychosocial Needs:  Social Determinants of Health     Food Insecurity: Not on file   Depression: At risk (2/23/2024)    PHQ-2     PHQ-2 Score: 3   Housing Stability: Not on file    Tobacco Use: Medium Risk (3/21/2024)    Patient History     Smoking Tobacco Use: Former     Smokeless Tobacco Use: Never     Passive Exposure: Past   Financial Resource Strain: Not on file   Alcohol Use: Not At Risk (3/12/2019)    AUDIT-C     Frequency of Alcohol Consumption: Never     Average Number of Drinks: Not on file     Frequency of Binge Drinking: Not on file   Transportation Needs: Not on file   Physical Activity: Not on file   Interpersonal Safety: Not on file   Stress: Not on file   Social Connections: Not on file   Health Literacy: Not on file       Functional Status:  Prior to admission patient needed assistance:   Dependent ADLs:: Independent  Dependent IADLs:: Independent       Mental Health Status:  Mental Health Status: No Current Concerns       Chemical Dependency Status:  Chemical Dependency Status: No Current Concerns             Values/Beliefs:  Spiritual, Cultural Beliefs, Jew Practices, Values that affect care: no               Additional Information:  Initial consult done with patient and significant other. Patient lives independently. Currently uses oxygen at night bleeding into his cpap, unsure if continuous oxygen needed at discharge. Patient state oxygen is through [facility]. Case management will continue to follow for disposition planning.     Tori Espinoza RN   [facility]   Phone [phone], [employer] or [phone]

## 2024-05-06 NOTE — UTILIZATION REVIEW
Admission Status; Secondary Review Determination       Under the authority of the Utilization Management Committee, the utilization review process indicated a secondary review on the above patient. The review outcome is based on review of the medical records, discussions with staff, and applying clinical experience noted on the date of the review.     (x) Inpatient Status Appropriate - This patient's medical care is consistent with medical management for inpatient care and reasonable inpatient medical practice.     RATIONALE FOR DETERMINATION     Mike Schultz is a 73 year old male with history of alcohol abuse, symptomatic atrial fibrillation refractory to medical therapy (Flecainide) s/p PVI in 2017 with re-isolation of reconnected PVs in 2019, recurrent AF s/p DCCV and amiodarone (discontinued with discovery of pneumonitis 11/2023), HFpEF, and recent redo ablation on 5/2 /24. He presented to the ED on 5/5/2024 with worsening shortness of breath. ED evaluation showed hypoxia with oxygen saturations of 84% on room air for which she was started on supplemental oxygen.  Laboratory evaluation showed BNP 1202, troponin 90 (85 on repeat), white blood cells 11.1, and negative testing for COVID/influenza/RSV.  Chest x-ray showed prominent heart size. There was diffuse indistinctness of the pulmonary interstitium possibly due to edema. There was some patchy opacity involving the retrocardiac region of the left lung base as well as the lateral aspect of the right middle lobe.  He was admitted with acute hypoxic respiratory failure due to acute exacerbation of congestive heart failure with reduced ejection fraction.  Cardiology was consulted.  He is being diuresed with Lasix 40 mg IV twice daily.  He continues to require supplemental oxygen at 5 L/min.  Inpatient admission is appropriate given this patient's complicated cardiac history, acute respiratory failure with ongoing need of supplemental oxygen, and ongoing  aggressive diuresis with IV Lasix twice daily.    At the time of admission with the information available to the attending physician more than 2 nights Hospital complex care was anticipated, based on patient risk of adverse outcome if treated as outpatient and complex care required. Inpatient admission is appropriate based on the Medicare guidelines.     This document was produced using voice recognition software       The information on this document is developed by the utilization review team in order for the business office to ensure compliance. This only denotes the appropriateness of proper admission status and does not reflect the quality of care rendered.   The definitions of Inpatient Status and Observation Status used in making the determination above are those provided in the CMS Coverage Manual, Chapter 1 and Chapter 6, section 70.4.   Sincerely,     Ivan Mendoza MD    Utilization Review  Physician Advisor  [facility].

## 2024-05-07 ENCOUNTER — APPOINTMENT (OUTPATIENT)
Dept: OCCUPATIONAL THERAPY | Facility: CLINIC | Age: 74
DRG: 273 | End: 2024-05-07
Attending: INTERNAL MEDICINE
Payer: COMMERCIAL

## 2024-05-07 LAB
ANION GAP SERPL CALCULATED.3IONS-SCNC: 13 MMOL/L (ref 7–15)
BUN SERPL-MCNC: 17 MG/DL (ref 8–23)
CALCIUM SERPL-MCNC: 8.9 MG/DL (ref 8.8–10.2)
CHLORIDE SERPL-SCNC: 97 MMOL/L (ref 98–107)
CREAT SERPL-MCNC: 0.93 MG/DL (ref 0.67–1.17)
DEPRECATED HCO3 PLAS-SCNC: 27 MMOL/L (ref 22–29)
EGFRCR SERPLBLD CKD-EPI 2021: 87 ML/MIN/1.73M2
GLUCOSE SERPL-MCNC: 173 MG/DL (ref 70–99)
MAGNESIUM SERPL-MCNC: 1.9 MG/DL (ref 1.7–2.3)
POTASSIUM SERPL-SCNC: 3.9 MMOL/L (ref 3.4–5.3)
SODIUM SERPL-SCNC: 137 MMOL/L (ref 135–145)

## 2024-05-07 PROCEDURE — 99233 SBSQ HOSP IP/OBS HIGH 50: CPT | Performed by: HOSPITALIST

## 2024-05-07 PROCEDURE — 80048 BASIC METABOLIC PNL TOTAL CA: CPT | Performed by: INTERNAL MEDICINE

## 2024-05-07 PROCEDURE — 250N000013 HC RX MED GY IP 250 OP 250 PS 637: Performed by: INTERNAL MEDICINE

## 2024-05-07 PROCEDURE — 99233 SBSQ HOSP IP/OBS HIGH 50: CPT | Mod: FS | Performed by: NURSE PRACTITIONER

## 2024-05-07 PROCEDURE — 97165 OT EVAL LOW COMPLEX 30 MIN: CPT | Mod: GO | Performed by: OCCUPATIONAL THERAPIST

## 2024-05-07 PROCEDURE — 97535 SELF CARE MNGMENT TRAINING: CPT | Mod: GO | Performed by: OCCUPATIONAL THERAPIST

## 2024-05-07 PROCEDURE — 36415 COLL VENOUS BLD VENIPUNCTURE: CPT | Performed by: INTERNAL MEDICINE

## 2024-05-07 PROCEDURE — 250N000011 HC RX IP 250 OP 636: Performed by: INTERNAL MEDICINE

## 2024-05-07 PROCEDURE — 97110 THERAPEUTIC EXERCISES: CPT | Mod: GO | Performed by: OCCUPATIONAL THERAPIST

## 2024-05-07 PROCEDURE — 83735 ASSAY OF MAGNESIUM: CPT | Performed by: HOSPITALIST

## 2024-05-07 PROCEDURE — 120N000001 HC R&B MED SURG/OB

## 2024-05-07 RX ORDER — METOLAZONE 2.5 MG/1
2.5 TABLET ORAL DAILY
Status: DISCONTINUED | OUTPATIENT
Start: 2024-05-07 | End: 2024-05-16 | Stop reason: HOSPADM

## 2024-05-07 RX ADMIN — FUROSEMIDE 40 MG: 10 INJECTION, SOLUTION INTRAMUSCULAR; INTRAVENOUS at 08:15

## 2024-05-07 RX ADMIN — POTASSIUM CHLORIDE 40 MEQ: 1500 TABLET, EXTENDED RELEASE ORAL at 17:00

## 2024-05-07 RX ADMIN — RIVAROXABAN 20 MG: 20 TABLET, FILM COATED ORAL at 16:58

## 2024-05-07 RX ADMIN — FUROSEMIDE 40 MG: 10 INJECTION, SOLUTION INTRAMUSCULAR; INTRAVENOUS at 17:00

## 2024-05-07 RX ADMIN — ALLOPURINOL 300 MG: 300 TABLET ORAL at 08:16

## 2024-05-07 RX ADMIN — METOPROLOL TARTRATE 25 MG: 25 TABLET, FILM COATED ORAL at 21:15

## 2024-05-07 RX ADMIN — METOPROLOL TARTRATE 25 MG: 25 TABLET, FILM COATED ORAL at 08:15

## 2024-05-07 RX ADMIN — LOSARTAN POTASSIUM 50 MG: 25 TABLET, FILM COATED ORAL at 08:16

## 2024-05-07 RX ADMIN — PANTOPRAZOLE SODIUM 40 MG: 40 TABLET, DELAYED RELEASE ORAL at 06:54

## 2024-05-07 RX ADMIN — POTASSIUM CHLORIDE 40 MEQ: 1500 TABLET, EXTENDED RELEASE ORAL at 08:16

## 2024-05-07 RX ADMIN — METOLAZONE 2.5 MG: 2.5 TABLET ORAL at 21:15

## 2024-05-07 RX ADMIN — FINASTERIDE 1 MG: 1 TABLET, FILM COATED ORAL at 08:16

## 2024-05-07 ASSESSMENT — ACTIVITIES OF DAILY LIVING (ADL)
ADLS_ACUITY_SCORE: 22
PREVIOUS_RESPONSIBILITIES: MEAL PREP;HOUSEKEEPING;LAUNDRY;SHOPPING;YARDWORK;MEDICATION MANAGEMENT;FINANCES;DRIVING;WORK
ADLS_ACUITY_SCORE: 22

## 2024-05-07 NOTE — PLAN OF CARE
5/6/24 -- [id number]    Orientation: A&Ox4.   Activity: Independent in room   Diet/BS Checks: 2 gram sodium with 1800 mL Fr. No caffeine   Tele:  NSR with PVCs and 1st degree AV block  IV Access/Drains: R PIV SL   Pain Management: denied pain  Abnormal VS/Results: VSS on 5L NC O2  Bowel/Bladder: continent of B/B. No BM this shift. Utilized urinal   Skin/Wounds: karine BLE. Scab to R shin  Consults: cardiology  D/C Disposition: pending

## 2024-05-07 NOTE — PROGRESS NOTES
Spoke to Marta Davila NP and updated her on pt needing 5LNC- O2sats 90-92% in the flowsheets. We will hold on doing the Kristy until pt is requiring less O2 or no O2.  Bedside RN called the [facility] and she stated they were unable to wean the O2 on this pt. This am.  Nuclear med updated this am as well.

## 2024-05-07 NOTE — PROGRESS NOTES
[facility]    Cardiology Progress Note    Primary Cardiologist: Dr. Day (EP)    Date of Admission: 5/5/2024  Service Date: 05/07/24    Summary:  Mr. Mike Schultz is a very pleasant 73 year old male with a past medical history of alcohol abuse, symptomatic atrial fibrillation refractory to medical therapy (Flecainide) s/p PVI in 2017 with re-isolation of reconnected PVs in 2019, recurrent AF s/p DCCV and amiodarone (discontinued with discovery of pneumonitis 11/2023), HFpEF and is s/p recent redo ablation on 5/2  who was admitted on 5/5/2024 for worsening shortness of breath. Cardiology was consulted for heart failure.    Interval History   Patient continues to require 5L of oxygen to maintain saturations. Adequate UOP yesterday with 2.6L out, net negative 1.2L. Weight down 2lbs. he feels his shortness of breath is improving as well as his lower extremity edema.  He still becomes dyspneic with speaking.  BMP remained stable.    Echocardiogram done yesterday showing borderline LV dilation with mild concentric left ventricular hypertrophy.  It also showed borderline right ventricular dilation with normal function.  Moderate aortic stenosis    Telemetry: Sinus rhythm with occasional PVC's, rate 70's    Assessment & Plan   Acute on chronic HFpEF   - Hospitalized in Feb 2024 with multiple winter/spring 2024 clinic visits for medication titration   - LVEF: 55% (5/2024)  - NYHA class III  - Fluid status: mild hypervolemia; suspected dry weight: unclear, likely ~290-295#  - Diuretic regimen: PTA torsemide 80mg in am and 40mg in pm   - Guideline directed medical therapy:  - Beta blocker: Metoprolol 25mg BID  - ACEI/ARB/ARNI: Losartan 50mg daily    - Aldactone antagonist: None, previously on 12.5mg daily, which was stopped due to worsened renal function   - SGLT2 inhibitor: None  -Chest xray on admission with pulmonary interstitium which can be seen with airway inflammation or edema, no pleural  effusion or pneumothorax, some patchy opacity involving the retrocardiac region of the left lung base as well as the lateral aspect of the right middle lobe suspicious for infection    2. Recurrent atrial fibrillation s/p DCCV and amiodarone with recent redo ablation on 5/2/24 with Dr. Day  - POC echo in ER without evidence of pericardial effusion   -Anticoagulated with xarelto     3. Elevated troponin  - Flat trend 90-85, unlikely to be ACS given absence of delta rise and anginal symptoms, most likely related to being s/p ablation     4. Moderate aortic stenosis  - Stable on TTE with a mean gradient of 35 mmHg (stable when compared to February 2024), however heavily calcified     5. Hx of alcohol abuse  - Precipitating atrial fibrillation recurrences     Plan:   1. Defer lexiscan nuclear stress test to tomorrow given continued oxygen needs, nursing to continue to work on weaning this down over the next 24 hours  2. Continue PTA medications including metoprolol, losartan, and xarelto  3. Continue furosemide 40mg IV BID, likely resume PO diuretics tomorrow  4. May need a RHC this admission to assess volume status, guide diuresis  5.  Cardiology will continue to follow    Thank you for the opportunity to participate in this pleasant patient's care.     AYAN Manzanares, CNP   Nurse Practitioner  [facility]  Pager: [phone]  (8am - 5pm, M-F)    Patient Active Problem List   Diagnosis    Essential hypertension    Impotence of organic origin    Allergic rhinitis    Herpes simplex virus (HSV) infection    Class 3 obesity (H)    Tobacco use disorder, moderate, in sustained remission    Atrial fibrillation, permanent (H)    Advanced directives, counseling/discussion    Family history of ischemic heart disease    Gout    BOWEN (obstructive sleep apnea)    Ascending aorta dilatation (H24)    Impaired fasting glucose    Aortic valve stenosis    Venous insufficiency    Bilateral carpal tunnel  syndrome    Other cardiomyopathies (H)    Leg wound, right    Lower extremity edema    Ulcer of lower limb, unspecified laterality, with fat layer exposed (H)    Acute respiratory failure with hypoxia (H)    Acute on chronic congestive heart failure, unspecified heart failure type (H)       Physical Exam   Temp: 97.5  F (36.4  C) Temp src: Oral BP: 96/74 Pulse: 72   Resp: 18 SpO2: 93 % O2 Device: Nasal cannula Oxygen Delivery: 5 LPM  Vitals:    05/05/24 0946 05/06/24 0600 05/07/24 0630   Weight: 132 kg (291 lb) 133 kg (293 lb 3.2 oz) 132.2 kg (291 lb 8 oz)     Vital Signs with Ranges  Temp:  [97.5  F (36.4  C)-98.9  F (37.2  C)] 97.5  F (36.4  C)  Pulse:  [71-81] 72  Resp:  [18] 18  BP: ()/(57-81) 96/74  SpO2:  [90 %-93 %] 93 %  I/O last 3 completed shifts:  In: 750 [P.O.:750]  Out: 2100 [Urine:2100]    Constitutional:  Appears his stated age, well nourished, and in no acute distress.  Eyes: Pupils equal, round. Sclerae anicteric.   HEENT: Normocephalic, atraumatic.   Neck: Supple.   Respiratory: Breathing non-labored. Lungs clear to auscultation bilaterally. No crackles, wheezes, rhonchi, or rales.  Cardiovascular: Regular rate and rhythm, normal S1 and S2.  III/VI systolic murmur  GI: Soft, non-distended, non-tender, bowel sounds present in all four quadrants.  Skin: Warm, dry.   Musculoskeletal/Extremities: Moves all extremities well and symmetrically. Trace bilateral LE edema  Neurologic: No gross focal deficits. Alert, awake, and oriented to person, place and time.  Psychiatric: Affect appropriate. Mentation normal.    Medications   Current Facility-Administered Medications   Medication Dose Route Frequency Provider Last Rate Last Admin    - MEDICATION INSTRUCTIONS -   Does not apply DOES NOT GO TO Rico Herring,         Continuing ACE inhibitor/ARB/ARNI from home medication list OR ACE inhibitor/ARB/ARNI order already placed during this visit   Does not apply DOES NOT GO TO Rico Herring,  DO        Continuing beta blocker from home medication list OR beta blocker order already placed during this visit   Does not apply DOES NOT GO TO MAR Rico Lemus P, DO        Patient is already receiving anticoagulation with heparin, enoxaparin (LOVENOX), warfarin (COUMADIN)  or other anticoagulant medication   Does not apply Continuous PRN Rico Lemus P, DO         Current Facility-Administered Medications   Medication Dose Route Frequency Provider Last Rate Last Admin    allopurinol (ZYLOPRIM) tablet 300 mg  300 mg Oral Daily Rico Lemus P, DO   300 mg at 24 0816    finasteride (PROPECIA) tablet 1 mg  1 mg Oral Daily Rico Lemus P, DO   1 mg at 24 0816    furosemide (LASIX) injection 40 mg  40 mg Intravenous BID Rico Lemus P, DO   40 mg at 24 0815    losartan (COZAAR) tablet 50 mg  50 mg Oral Daily Rico Lemus P, DO   50 mg at 24 0816    metoprolol tartrate (LOPRESSOR) tablet 25 mg  25 mg Oral BID Rico Lemus P, DO   25 mg at 24 0815    pantoprazole (PROTONIX) EC tablet 40 mg  40 mg Oral QAM AC Rico Lemus P, DO   40 mg at 24 0654    potassium chloride pushpa ER (KLOR-CON M20) CR tablet 40 mEq  40 mEq Oral BID Rico Lemus P, DO   40 mEq at 24 0816    rivaroxaban ANTICOAGULANT (XARELTO) tablet 20 mg  20 mg Oral Daily with supper Trae Lemusn P, DO   20 mg at 24 1731    sodium chloride (PF) 0.9% PF flush 3 mL  3 mL Intracatheter Q8H Rico Lemus P, DO   3 mL at 24 0653       Data   Recent Results (from the past 24 hour(s))   Echocardiogram Complete   Result Value    LVEF  55%    Narrative    [id number]  [id number]  [id number]^GAMALIEL^RICO^P     [facility]  Echocardiography Laboratory  [address] [place] [id number]     Name: TOO HOWARD  MRN: [id number]  : 1950  Study Date: 2024 11:48 AM  Age: 73 yrs  Gender: Male  Patient Location: [facility]  Reason For Study: Heart Failure  Ordering Physician:  JOAN ALSTON  Referring Physician: Ranjan Courtney  Performed By: Norah Suarez     BSA: 2.4 m2  Height: 68 in  Weight: 291 lb  HR: 75  BP: 110/80 mmHg  ______________________________________________________________________________  Procedure  Complete Portable Echo Adult.  ______________________________________________________________________________  Interpretation Summary     Technically challenging study due to patient body habitus.     The left ventricle is borderline dilated. There is mild concentric left  ventricular hypertrophy.  Left ventricular systolic function is low normal. The visual ejection fraction  is estimated at 55%. Regional wall motion challenging to assess due to  frequent ectopy/irregular R-R intervals however no obvious regional wall  motion abnormalities seen.  The right ventricle is borderline dilated. The right ventricular systolic  function is normal.  Moderate aortic stenosis, Vmax 3.7 m/s, mean gradient 35 mmHg, MARILYN 1.1cm2, DI  0.26. SVi 44 cc/m2. There is mild (1+) aortic regurgitation. Aortic valve  morphology is not well visualized, however it is heavily calcified.  There is no pericardial effusion.  The inferior vena cava was normal in size with preserved respiratory  variability.     This study was compared to a TTE from 2/11/2024. Global LV systolic function  is marginally less dynamic on this present study.  ______________________________________________________________________________  Left Ventricle  The left ventricle is borderline dilated. There is mild concentric left  ventricular hypertrophy. Left ventricular systolic function is low normal. The  visual ejection fraction is estimated at 55%. Grade III or advanced diastolic  dysfunction. Regional wall motion challenging to assess due to frequent  ectopy/irregular R-R intervals however no obvious regional wall motion  abnormalities seen.     Right Ventricle  The right ventricle is borderline dilated. The right  ventricular systolic  function is normal.     Atria  The left atrium is moderately dilated. Right atrial size is normal.     Mitral Valve  There is mild mitral annular calcification. There is mild (1+) mitral  regurgitation.     Tricuspid Valve  The tricuspid valve is not well visualized, but is grossly normal.     Aortic Valve  Aortic valve morphology is not well visualized, however it is heavily  calcified. There is mild (1+) aortic regurgitation. Moderate aortic stenosis,  Vmax 3.7 m/s, mean gradient 35 mmHg, MARILYN 1.1cm2, DI 0.26. SVi 44 cc/m2.     Pulmonic Valve  The pulmonic valve is not well seen, but is grossly normal.     Vessels  The aortic root is normal size. Ascending aorta dilatation is present. The  inferior vena cava was normal in size with preserved respiratory variability.     Pericardium  There is no pericardial effusion.     Rhythm  The rhythm was undetermined.  ______________________________________________________________________________  MMode/2D Measurements & Calculations  IVSd: 1.1 cm     LVIDd: 5.9 cm  LVIDs: 3.9 cm  LVPWd: 1.2 cm  FS: 34.8 %  LV mass(C)d: 300.5 grams  LV mass(C)dI: 125.3 grams/m2  Ao root diam: 3.5 cm  LA dimension: 4.8 cm  asc Aorta Diam: 4.0 cm  LA/Ao: 1.3  LVOT diam: 2.4 cm  LVOT area: 4.5 cm2  Ao root diam index Ht(cm/m): 2.1  Ao root diam index BSA (cm/m2): 1.5  Asc Ao diam index BSA (cm/m2): 1.7  Asc Ao diam index Ht(cm/m): 2.3  LA Volume (BP): 111.0 ml     LA Volume Index (BP): 46.3 ml/m2  RWT: 0.42  TAPSE: 1.7 cm     Doppler Measurements & Calculations  MV E max domo: 115.0 cm/sec  MV A max domo: 50.9 cm/sec  MV E/A: 2.3  MV dec slope: 682.9 cm/sec2  MV dec time: 0.17 sec  Ao V2 max: 365.0 cm/sec  Ao max P.3 mmHg  Ao V2 mean: 284.5 cm/sec  Ao mean P.5 mmHg  Ao V2 VTI: 93.2 cm  MARILYN(I,D): 1.1 cm2  MARILYN(V,D): 1.2 cm2  AI P1/2t: 415.1 msec  LV V1 max PG: 3.6 mmHg  LV V1 max: 95.2 cm/sec  LV V1 VTI: 23.2 cm  SV(LVOT): 105.1 ml  SI(LVOT): 43.8 ml/m2  PA acc time:  0.08 sec  AV Seamus Ratio (DI): 0.26  MARILYN Index (cm2/m2): 0.47  E/E' av.4     Lateral E/e': 21.2  Medial E/e': 19.5  RV S Seamus: 10.1 cm/sec     ______________________________________________________________________________  Report approved by: Laurie Baires 2024 03:36 PM             Recent Labs   Lab 24  1003 24  0720   WBC 11.1* 8.7   HGB 13.7 13.6   HCT 41.5 40.3    98    224     Recent Labs   Lab 24  0909 24  0854 24  1003    139 139   POTASSIUM 3.9 3.8 3.6   CHLORIDE 97* 99 98   CO2 27 31* 28   ANIONGAP 13 9 13   * 119* 129*   BUN 17.0 18.6 24.7*   CR 0.93 0.97 1.10   GFRESTIMATED 87 83 71   ASHELY 8.9 8.5* 8.3*        This note was completed in part using Dragon voice recognition software. Although reviewed after completion, some word and grammatical errors may occur.

## 2024-05-07 NOTE — PLAN OF CARE
Orientation: A&Ox4  Activity: independent   Diet/BS Checks: 2gm Na w/ 1800mL FR. No caffeine  Tele:  nsr  IV Access/Drains: R PIV SL  Pain Management: denies  Abnormal VS/Results: VSS on 4L NC  Bowel/Bladder: continent B/B. No BM. Use urine   Skin/Wounds: Sam BLE. Scab to R shin  Consults: cardiology  D/C Disposition: pending  Other Info:

## 2024-05-07 NOTE — PROGRESS NOTES
[facility]    Medicine Progress Note - Hospitalist Service    Date of Admission:  5/5/2024    Assessment & Plan     This is a 73-year-old male with medical history which includes obesity, obstructive sleep apnea on CPAP,atrial fibrillation refractory to medical therapy (Flecainide) s/p PVI in 2017 with re-isolation of reconnected PVs in 2019, recurrent AF s/p DCCV and amiodarone, HFpEF and is s/p recent redo ablation on 5/2 who presented to the ED  on 5/5/24 with shortness of breath and was diagnosed with acute hypoxic respiratory failure due to pulmonary edema    Acute on chronic heart failure with preserved ejection fraction  Acute hypoxic respiratory failure due to above  Moderate aortic stenosis  -Last echo in 2/2024 showed EF of 55 to 60%  -Patient has been admitted in November 2023 in February 2023 for a dyspnea and workup in November including CT scan of the chest showed widespread pneumonitis 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 and it was stopped and patient was started on 60 mg prednisone and was treated for at least 3 months and followed with pulmonary team as outpatient  -Outpatient PFTs in 12/23 showed mild restriction  -Repeat CT scan as outpatient showed subsequent improvement in his pneumonitis  -Patient again was admitted in February 2023 for dyspnea along with anasarca and was diuresed during that hospital stay    -Again presented this admission with shortness of breath and workup on admission was consistent with elevated proBNP of 1202, EKG no ST elevation and did increase the dose of diuretics at home but failed as outpatient  - CT scan of the chest on 3/24 showed improvement in bilateral interstitial pulmonary infiltrates with likely component of fibrosis and no suspicious pulmonary nodules  -Chest x-ray on 5/5/2024 showed diffuse indistinctness of the pulmonary interstitium which can be seen with air inflammation or edema and  No pleural effusion  or pneumothorax  -Echo 5/6/2024-EF of 55%, mild LVH, moderate aortic stenosis, no pericardial effusion, grade 3 or advanced diastolic dysfunction and not able to assess regional wall motion changes  -Patient has been started on IV diuresis with IV Lasix 40 twice daily  And cardiology has been following the patient  -He is -2.4 L since admit  -Weight on 5/7 is 291 pounds  -On exam his lungs are clear to auscultation but he is still needing 4 to 5 L of oxygen  -Per cardiology defer nuclear stress test to tomorrow given oxygen needs and try to wean him off  -Continue with IV Lasix 40 twice daily  -Monitor intake and output and daily weight  -May need right heart cath to assess volume status    Recurrent atrial fibrillation s/p DCCV and amiodarone with recent redo ablation on 5/2/24 with Dr. Day   -Continue with Xarelto and metoprolol 25 twice daily  -Repeat echo as above  -Continue to monitor on telemetry       Mildly elevated troponin, suspect demand/type II MI:  -Initial troponin was 90 and repeat was 85  -No evidence of any chest discomfort  -EKG on admit did not show any ST elevation  -Echo reviewed and detailed report report as above  -Continue to monitor on telemetry     History of prolonged QT interval  -He does have QT interval which runs on the higher side and EKG on admission was reviewed    Concern for amiodarone toxicity this past winter with hypoxia/lung issues  -  He had felt better since then before gaining weight/edema with his more recent volume issues.     Obstructive sleep apnea:  -CPAP as per home regimen and encourage patient to be compliant     Gout:  -Continue pPTA  allopurinol    Discharge planning//barriers to discharge  -Patient is still needing a lot of oxygen and is being diuresed with IV medications and anticipate that he will be in the hospital for the next few days  -I do anticipate that he would not have any therapy needs and will be able to go home when medically stable          Diet:  "Combination Diet 2 gm NA Diet; No Caffeine Diet (and additional linked orders)  Fluid restriction 1800 ML FLUID (and additional linked orders)    DVT Prophylaxis: DOAC  Palmer Catheter: Not present  Lines: None     Cardiac Monitoring: ACTIVE order. Indication: Acute decompensated heart failure (48 hours)  Code Status: Full Code      Clinically Significant Risk Factors                    # Hypertension: Noted on problem list  # Acute heart failure with preserved ejection fraction: heart failure noted on problem list, last echo with EF >50%, and receiving IV diuretics       # Severe Obesity: Estimated body mass index is 44.32 kg/m  as calculated from the following:    Height as of this encounter: 1.727 m (5' 8\").    Weight as of this encounter: 132.2 kg (291 lb 8 oz)., PRESENT ON ADMISSION       # Financial/Environmental Concerns: none         Disposition Plan     Medically Ready for Discharge: Anticipated in 2-4 Days             Tawny Alcantar MD  Hospitalist Service  [facility]  Securely message with [employer] (more info)  Text page via [employer] Paging/Directory   ______________________________________________________________________    Interval History     -Seen and discussed results of echo and is still eating 4 to 5 L of oxygen  -Still short of breath but feeling little better and swelling is little better  -No fever, chills or cough  -Has been updating his wife    Physical Exam   Vital Signs: Temp: 97.4  F (36.3  C) Temp src: Oral BP: 102/74 Pulse: 67   Resp: 18 SpO2: 93 % O2 Device: Nasal cannula Oxygen Delivery: 4 LPM  Weight: 291 lbs 8 oz        General: AOx3  Respiratory: CTLA and minimal basilar crackles and is on nasal cannula oxygen at 4 L  Cardiovascular: Regular rate , S1 and S2 normal with no murmer or rubs or gallops and has edema over both lower extremity and seems to be less   Abdomen:   soft , non tender   Neurologic: No focal deficit  Musculoskeletal: Normal Range of motion over " upper and lower extremities bilaterally   Psychiatric: cooperative     Medical Decision Making       Time spent in care of patient is 45 minutes and I reviewed labs including basic metabolic panel, echocardiogram report and discussed plan of care in detail with the patient and nursing staff and reviewed notes from cardiology team and discussed with case management      Data     I have personally reviewed the following data over the past 24 hrs:    N/A  \   N/A   / N/A     137 97 (L) 17.0 /  173 (H)   3.9 27 0.93 \       Imaging results reviewed over the past 24 hrs:   No results found for this or any previous visit (from the past 24 hour(s)).

## 2024-05-07 NOTE — CONSULTS
[facility]    Received [facility] Consult from Dr. Lemus.     Reviewed B[facility]'s chart and note they are admitted for HFpEF and recurrent Afib. Echocardiogram shows LVEF 55%. This is their second admission in the past year for concerns of heart failure.     Given above information and LVEF >/= 40% , [facility] meets criteria for general Cardiology follow up. Will defer follow up plans to inpatient Cardiology and Hospitalist teams.     Bedside RN to do CHF education.       Future Appointments   Date Time Provider Department Center   5/7/2024  3:45 PM Marta Horowitz OTR SHOT [facility]   5/13/2024  1:45 PM [id number] [facility]   5/13/2024  2:30 PM [facility]   6/5/2024  8:00 AM Tom Cason MD [facility]   7/2/2024  8:20 AM Tom Cason MD [facility]   7/15/2024 10:00 AM Jeanette Mckeon MD [facility]   7/29/2024  8:30 AM Curtis Parra MD [facility]   9/10/2024  4:15 PM Fady Day MD [facility]   9/30/2024  9:45 AM Maninder Huffman MD [facility]   11/11/2024 10:15 AM Pedro Luis Romeo MD [facility]       Please call with questions.      Olga Rojas RN BSN  [facility] RN Care Coordinator   [facility]   [phone]   [facility]: Cardiomyopathy, Optimization, Rehabilitation, Education

## 2024-05-07 NOTE — PLAN OF CARE
Orientation: A&Ox4  Activity: Independent in room  Diet/BS Checks: 2 g Na, no caffeine. 1800 Fluid restriction   Tele: NSR  IV Access/Drains: R PIV CDI SL   Pain Management: Denied   Abnormal VS/Results: VSS on 5L O2 via NC. K recheck in AM.   Bowel/Bladder: Continent of both   Skin/Wounds: Intact  Consults: Cardiology/Nutrition/Care Coordinator   D/C Disposition: pending   Other Info: Pt has hx BOWEN and CPAP use at home. Writer offered pt to use oxymask overnight, will request of oncoming shift.

## 2024-05-08 ENCOUNTER — APPOINTMENT (OUTPATIENT)
Dept: NUCLEAR MEDICINE | Facility: CLINIC | Age: 74
DRG: 273 | End: 2024-05-08
Attending: NURSE PRACTITIONER
Payer: COMMERCIAL

## 2024-05-08 ENCOUNTER — APPOINTMENT (OUTPATIENT)
Dept: OCCUPATIONAL THERAPY | Facility: CLINIC | Age: 74
DRG: 273 | End: 2024-05-08
Payer: COMMERCIAL

## 2024-05-08 ENCOUNTER — APPOINTMENT (OUTPATIENT)
Dept: CT IMAGING | Facility: CLINIC | Age: 74
DRG: 273 | End: 2024-05-08
Attending: HOSPITALIST
Payer: COMMERCIAL

## 2024-05-08 ENCOUNTER — APPOINTMENT (OUTPATIENT)
Dept: GENERAL RADIOLOGY | Facility: CLINIC | Age: 74
DRG: 273 | End: 2024-05-08
Attending: INTERNAL MEDICINE
Payer: COMMERCIAL

## 2024-05-08 LAB
ANION GAP SERPL CALCULATED.3IONS-SCNC: 10 MMOL/L (ref 7–15)
ANION GAP SERPL CALCULATED.3IONS-SCNC: 12 MMOL/L (ref 7–15)
BUN SERPL-MCNC: 22.5 MG/DL (ref 8–23)
BUN SERPL-MCNC: 23 MG/DL (ref 8–23)
CALCIUM SERPL-MCNC: 9.1 MG/DL (ref 8.8–10.2)
CALCIUM SERPL-MCNC: 9.1 MG/DL (ref 8.8–10.2)
CHLORIDE SERPL-SCNC: 98 MMOL/L (ref 98–107)
CHLORIDE SERPL-SCNC: 98 MMOL/L (ref 98–107)
CREAT SERPL-MCNC: 1.05 MG/DL (ref 0.67–1.17)
CREAT SERPL-MCNC: 1.06 MG/DL (ref 0.67–1.17)
DEPRECATED HCO3 PLAS-SCNC: 27 MMOL/L (ref 22–29)
DEPRECATED HCO3 PLAS-SCNC: 27 MMOL/L (ref 22–29)
EGFRCR SERPLBLD CKD-EPI 2021: 75 ML/MIN/1.73M2
EGFRCR SERPLBLD CKD-EPI 2021: 75 ML/MIN/1.73M2
GLUCOSE SERPL-MCNC: 106 MG/DL (ref 70–99)
GLUCOSE SERPL-MCNC: 108 MG/DL (ref 70–99)
POTASSIUM SERPL-SCNC: 3.7 MMOL/L (ref 3.4–5.3)
POTASSIUM SERPL-SCNC: 4 MMOL/L (ref 3.4–5.3)
SODIUM SERPL-SCNC: 135 MMOL/L (ref 135–145)
SODIUM SERPL-SCNC: 137 MMOL/L (ref 135–145)

## 2024-05-08 PROCEDURE — 82374 ASSAY BLOOD CARBON DIOXIDE: CPT | Performed by: INTERNAL MEDICINE

## 2024-05-08 PROCEDURE — 99232 SBSQ HOSP IP/OBS MODERATE 35: CPT | Mod: FS | Performed by: NURSE PRACTITIONER

## 2024-05-08 PROCEDURE — 120N000001 HC R&B MED SURG/OB

## 2024-05-08 PROCEDURE — 250N000013 HC RX MED GY IP 250 OP 250 PS 637: Performed by: INTERNAL MEDICINE

## 2024-05-08 PROCEDURE — 78452 HT MUSCLE IMAGE SPECT MULT: CPT | Mod: ME

## 2024-05-08 PROCEDURE — 78452 HT MUSCLE IMAGE SPECT MULT: CPT | Mod: 26 | Performed by: INTERNAL MEDICINE

## 2024-05-08 PROCEDURE — 71046 X-RAY EXAM CHEST 2 VIEWS: CPT

## 2024-05-08 PROCEDURE — 99233 SBSQ HOSP IP/OBS HIGH 50: CPT | Performed by: HOSPITALIST

## 2024-05-08 PROCEDURE — 250N000011 HC RX IP 250 OP 636: Performed by: INTERNAL MEDICINE

## 2024-05-08 PROCEDURE — 93017 CV STRESS TEST TRACING ONLY: CPT

## 2024-05-08 PROCEDURE — 71250 CT THORAX DX C-: CPT | Mod: MG

## 2024-05-08 PROCEDURE — 97530 THERAPEUTIC ACTIVITIES: CPT | Mod: GO

## 2024-05-08 PROCEDURE — 250N000011 HC RX IP 250 OP 636: Performed by: NURSE PRACTITIONER

## 2024-05-08 PROCEDURE — 36415 COLL VENOUS BLD VENIPUNCTURE: CPT | Performed by: INTERNAL MEDICINE

## 2024-05-08 PROCEDURE — 343N000001 HC RX 343: Performed by: INTERNAL MEDICINE

## 2024-05-08 PROCEDURE — G1010 CDSM STANSON: HCPCS | Performed by: INTERNAL MEDICINE

## 2024-05-08 PROCEDURE — A9500 TC99M SESTAMIBI: HCPCS | Performed by: INTERNAL MEDICINE

## 2024-05-08 PROCEDURE — 93018 CV STRESS TEST I&R ONLY: CPT | Performed by: INTERNAL MEDICINE

## 2024-05-08 RX ORDER — FUROSEMIDE 10 MG/ML
60 INJECTION INTRAMUSCULAR; INTRAVENOUS 2 TIMES DAILY
Status: DISCONTINUED | OUTPATIENT
Start: 2024-05-08 | End: 2024-05-11

## 2024-05-08 RX ADMIN — LOSARTAN POTASSIUM 50 MG: 25 TABLET, FILM COATED ORAL at 10:04

## 2024-05-08 RX ADMIN — POTASSIUM CHLORIDE 40 MEQ: 1500 TABLET, EXTENDED RELEASE ORAL at 17:03

## 2024-05-08 RX ADMIN — FUROSEMIDE 60 MG: 10 INJECTION, SOLUTION INTRAMUSCULAR; INTRAVENOUS at 17:03

## 2024-05-08 RX ADMIN — RIVAROXABAN 20 MG: 20 TABLET, FILM COATED ORAL at 17:04

## 2024-05-08 RX ADMIN — Medication 35 MILLICURIE: at 08:15

## 2024-05-08 RX ADMIN — FINASTERIDE 1 MG: 1 TABLET, FILM COATED ORAL at 10:04

## 2024-05-08 RX ADMIN — METOLAZONE 2.5 MG: 2.5 TABLET ORAL at 10:04

## 2024-05-08 RX ADMIN — METOPROLOL TARTRATE 25 MG: 25 TABLET, FILM COATED ORAL at 10:04

## 2024-05-08 RX ADMIN — METOPROLOL TARTRATE 25 MG: 25 TABLET, FILM COATED ORAL at 20:47

## 2024-05-08 RX ADMIN — POTASSIUM CHLORIDE 40 MEQ: 1500 TABLET, EXTENDED RELEASE ORAL at 10:04

## 2024-05-08 RX ADMIN — ALLOPURINOL 300 MG: 300 TABLET ORAL at 10:04

## 2024-05-08 RX ADMIN — PANTOPRAZOLE SODIUM 40 MG: 40 TABLET, DELAYED RELEASE ORAL at 06:45

## 2024-05-08 RX ADMIN — FUROSEMIDE 40 MG: 10 INJECTION, SOLUTION INTRAMUSCULAR; INTRAVENOUS at 10:03

## 2024-05-08 ASSESSMENT — ACTIVITIES OF DAILY LIVING (ADL)
ADLS_ACUITY_SCORE: 22
ADLS_ACUITY_SCORE: 27
ADLS_ACUITY_SCORE: 22
ADLS_ACUITY_SCORE: 22
ADLS_ACUITY_SCORE: 27
ADLS_ACUITY_SCORE: 22
ADLS_ACUITY_SCORE: 27
ADLS_ACUITY_SCORE: 22
ADLS_ACUITY_SCORE: 22
ADLS_ACUITY_SCORE: 27
ADLS_ACUITY_SCORE: 22
ADLS_ACUITY_SCORE: 22
ADLS_ACUITY_SCORE: 27
ADLS_ACUITY_SCORE: 22
ADLS_ACUITY_SCORE: 27
ADLS_ACUITY_SCORE: 22
ADLS_ACUITY_SCORE: 27

## 2024-05-08 NOTE — PROGRESS NOTES
[facility]    Cardiology Progress Note    Primary Cardiologist: Dr. Day (EP)    Date of Admission: 5/5/2024  Service Date: 05/08/24    Summary:  Mr. Mike Schultz is a very pleasant 73 year old male with a past medical history of alcohol abuse, symptomatic atrial fibrillation refractory to medical therapy (Flecainide) s/p PVI in 2017 with re-isolation of reconnected PVs in 2019, recurrent AF s/p DCCV and amiodarone (discontinued with discovery of pneumonitis 11/2023), HFpEF and is s/p recent redo ablation on 5/2  who was admitted on 5/5/2024 for worsening shortness of breath. Cardiology was consulted for heart failure.    Interval History   Oxygen needs improved over the last 24 hours. Now on 3L. Saturations drop into the 70's with ambulation. He feels his shortness of breath is improving as well as his lower extremity edema. BMP has remained stable.    Telemetry: Sinus rhythm with occasional PVC's, rate 70's    Assessment & Plan   Acute on chronic HFpEF   - Hospitalized in Feb 2024 with multiple winter/spring 2024 clinic visits for medication titration   - LVEF: 55% (5/2024)  - NYHA class III  - Fluid status: mild hypervolemia; suspected dry weight: unclear, likely ~290-295#, possibly lower   - Diuretic regimen: PTA torsemide 80mg in am and 40mg in pm   - Guideline directed medical therapy:  - Beta blocker: Metoprolol 25mg BID  - ACEI/ARB/ARNI: Losartan 50mg daily    - Aldactone antagonist: None, previously on 12.5mg daily, which was stopped due to worsened renal function   - SGLT2 inhibitor: None  -Chest xray on admission with pulmonary interstitium which can be seen with airway inflammation or edema, no pleural effusion or pneumothorax, some patchy opacity involving the retrocardiac region of the left lung base as well as the lateral aspect of the right middle lobe suspicious for infection  -Repeat chest x-ray showing similar interstitial opacities and mild bibasilar haziness  favored to reflect pulmonary edema. No new focal consolidation or pleural effusion.     2. Recurrent atrial fibrillation s/p DCCV and amiodarone with recent redo ablation on 5/2/24 with Dr. Day  - POC echo in ER without evidence of pericardial effusion   -Anticoagulated with xarelto     3. Elevated troponin  - Flat trend 90-85, unlikely to be ACS given absence of delta rise and anginal symptoms, most likely related to being s/p ablation     4. Moderate aortic stenosis  - Stable on TTE with a mean gradient of 35 mmHg (stable when compared to February 2024), however heavily calcified     5. Hx of alcohol abuse  - Precipitating atrial fibrillation recurrences     Plan:   1. Patient is a 2 day lexiscan, stress portion and repeat imaging to be repeated tomorrow   2. Continue PTA medications including metoprolol, losartan, and xarelto  3. Increase IV furosemide to 60mg BID with cxr continuing to show pulmonary edema   4. May need a RHC this admission to assess volume status, guide diuresis  5.  Cardiology will continue to follow    Thank you for the opportunity to participate in this pleasant patient's care.     AYAN Manzanares, CNP   Nurse Practitioner  [facility]  Pager: [phone]  (8am - 5pm, M-F)    Patient Active Problem List   Diagnosis    Essential hypertension    Impotence of organic origin    Allergic rhinitis    Herpes simplex virus (HSV) infection    Class 3 obesity (H)    Tobacco use disorder, moderate, in sustained remission    Atrial fibrillation, permanent (H)    Advanced directives, counseling/discussion    Family history of ischemic heart disease    Gout    BOWEN (obstructive sleep apnea)    Ascending aorta dilatation (H24)    Impaired fasting glucose    Aortic valve stenosis    Venous insufficiency    Bilateral carpal tunnel syndrome    Other cardiomyopathies (H)    Leg wound, right    Lower extremity edema    Ulcer of lower limb, unspecified laterality, with fat layer exposed  (H)    Acute respiratory failure with hypoxia (H)    Acute on chronic congestive heart failure, unspecified heart failure type (H)       Physical Exam   Temp: 98  F (36.7  C) Temp src: Oral BP: 108/67 Pulse: 68   Resp: (!) 76 SpO2: 93 % O2 Device: Nasal cannula Oxygen Delivery: 4 LPM  Vitals:    05/06/24 0600 05/07/24 0630 05/08/24 1004   Weight: 133 kg (293 lb 3.2 oz) 132.2 kg (291 lb 8 oz) 132.3 kg (291 lb 9.6 oz)     Vital Signs with Ranges  Temp:  [98  F (36.7  C)-98.3  F (36.8  C)] 98  F (36.7  C)  Pulse:  [68-81] 68  Resp:  [18-76] 76  BP: (108-146)/(65-86) 108/67  SpO2:  [91 %-93 %] 93 %  I/O last 3 completed shifts:  In: 1365 [P.O.:1365]  Out: 3000 [Urine:3000]    Constitutional:  Appears his stated age, well nourished, and in no acute distress.  Eyes: Pupils equal, round. Sclerae anicteric.   HEENT: Normocephalic, atraumatic.   Neck: Supple.   Respiratory: Breathing non-labored. Lungs clear to auscultation bilaterally. No crackles, wheezes, rhonchi, or rales.  Cardiovascular: Regular rate and rhythm, normal S1 and S2.  III/VI systolic murmur  GI: Soft, non-distended, non-tender, bowel sounds present in all four quadrants.  Skin: Warm, dry.   Musculoskeletal/Extremities: Moves all extremities well and symmetrically. Trace bilateral LE edema  Neurologic: No gross focal deficits. Alert, awake, and oriented to person, place and time.  Psychiatric: Affect appropriate. Mentation normal.    Medications   Current Facility-Administered Medications   Medication Dose Route Frequency Provider Last Rate Last Admin    - MEDICATION INSTRUCTIONS -   Does not apply DOES NOT GO TO Rico Herring, DO        Continuing ACE inhibitor/ARB/ARNI from home medication list OR ACE inhibitor/ARB/ARNI order already placed during this visit   Does not apply DOES NOT GO TO Rico Herring, DO        Continuing beta blocker from home medication list OR beta blocker order already placed during this visit   Does not apply DOES NOT  GO TO Rico Herring DO        Patient is already receiving anticoagulation with heparin, enoxaparin (LOVENOX), warfarin (COUMADIN)  or other anticoagulant medication   Does not apply Continuous PRN Rico Lemus DO         Current Facility-Administered Medications   Medication Dose Route Frequency Provider Last Rate Last Admin    allopurinol (ZYLOPRIM) tablet 300 mg  300 mg Oral Daily Rico Lemus P, DO   300 mg at 05/08/24 1004    finasteride (PROPECIA) tablet 1 mg  1 mg Oral Daily Rico Lemus, DO   1 mg at 05/08/24 1004    furosemide (LASIX) injection 40 mg  40 mg Intravenous BID Rico Lemus, DO   40 mg at 05/08/24 1003    losartan (COZAAR) tablet 50 mg  50 mg Oral Daily Rico Lemus, DO   50 mg at 05/08/24 1004    metolazone (ZAROXOLYN) tablet 2.5 mg  2.5 mg Oral Daily Anastacio Morrissey MD   2.5 mg at 05/08/24 1004    metoprolol tartrate (LOPRESSOR) tablet 25 mg  25 mg Oral BID Rico Lemus, DO   25 mg at 05/08/24 1004    pantoprazole (PROTONIX) EC tablet 40 mg  40 mg Oral QAM AC Rico Lemus, DO   40 mg at 05/08/24 0645    potassium chloride pushpa ER (KLOR-CON M20) CR tablet 40 mEq  40 mEq Oral BID Rico Lemus, DO   40 mEq at 05/08/24 1004    rivaroxaban ANTICOAGULANT (XARELTO) tablet 20 mg  20 mg Oral Daily with supper Rico Lemus, DO   20 mg at 05/07/24 1658    sodium chloride (PF) 0.9% PF flush 3 mL  3 mL Intracatheter Q8H Rico Lemus, DO   3 mL at 05/08/24 0645       Data   Recent Results (from the past 24 hour(s))   XR Chest 2 Views    Narrative    CHEST TWO VIEWS 5/8/2024 8:10 AM     HISTORY: Congestive heart failure, atrial fibrillation, aortic  stenosis. Prior abnormal chest x-ray.     COMPARISON: Chest radiograph 5/5/2024       Impression    IMPRESSION: Overall similar interstitial opacities and mild bibasilar  haziness favored to reflect pulmonary edema, less likely sequelae of  infection. No new focal consolidation or significant pleural effusion.  Scattered  subsegmental scarring/atelectasis. No pneumothorax. Similar  mildly enlarged heart size. Degenerative changes of the spine.     SEBASTIÁN CHIU MD         SYSTEM ID:  [id number]       Recent Labs   Lab 05/05/24  1003 05/02/24  0720   WBC 11.1* 8.7   HGB 13.7 13.6   HCT 41.5 40.3    98    224     Recent Labs   Lab 05/08/24  1258 05/07/24  2347 05/07/24  0909    135 137   POTASSIUM 3.7 4.0 3.9   CHLORIDE 98 98 97*   CO2 27 27 27   ANIONGAP 12 10 13   * 106* 173*   BUN 22.5 23.0 17.0   CR 1.06 1.05 0.93   GFRESTIMATED 75 75 87   ASHELY 9.1 9.1 8.9        This note was completed in part using Dragon voice recognition software. Although reviewed after completion, some word and grammatical errors may occur.

## 2024-05-08 NOTE — PROGRESS NOTES
[facility]    Medicine Progress Note - Hospitalist Service    Date of Admission:  5/5/2024    Assessment & Plan     This is a 73-year-old male with medical history which includes obesity, obstructive sleep apnea on CPAP,atrial fibrillation refractory to medical therapy (Flecainide) s/p PVI in 2017 with re-isolation of reconnected PVs in 2019, recurrent AF s/p DCCV and amiodarone, HFpEF and is s/p recent redo ablation on 5/2 who presented to the ED  on 5/5/24 with shortness of breath and was diagnosed with acute hypoxic respiratory failure due to pulmonary edema    Acute on chronic heart failure with preserved ejection fraction  Acute hypoxic respiratory failure due to above  Moderate aortic stenosis  -Last echo in 2/2024 showed EF of 55 to 60%  -Patient has been admitted in November 2023 in February 2023 for a dyspnea and workup in November including CT scan of the chest showed widespread pneumonitis 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 and it was stopped and patient was started on 60 mg prednisone and was treated for at least 3 months and followed with pulmonary team as outpatient  -Outpatient PFTs in 12/23 showed mild restriction  -Repeat CT scan as outpatient showed subsequent improvement in his pneumonitis  -Patient again was admitted in February 2023 for dyspnea along with anasarca and was diuresed during that hospital stay    -A5/2/24- admission with shortness of breath and workup on admission was consistent with elevated proBNP of 1202, EKG no ST elevation and did increase the dose of diuretics at home but failed as outpatient  - CT chest on 3/24 showed improvement in bilateral interstitial pulmonary infiltrates with likely component of fibrosis and no suspicious pulmonary nodules  -Chest x-ray - 5/5/2024 showed diffuse indistinctness of the pulmonary interstitium which can be seen with air inflammation or edema and  No pleural effusion or pneumothorax and  concern for patchy opacity involving the retrocardiac region of the lung base and lateral aspect of right middle lobe concerning for infection  -Echo 5/6/2024-EF of 55%, mild LVH, moderate aortic stenosis, no pericardial effusion, grade 3 or advanced diastolic dysfunction and not able to assess regional wall motion changes  -Patient has been diuresed significantly with IV Lasix but continues to have high oxygen need  -Net -4.7 L since admission  -Nuclear stress test part one done and to be completed on 5/9  -Chest x-ray 5/8-similar interstitial opacities and mild basilar haziness favored to represent pulmonary edema and less likely sequela, no pneumothorax and scattered subsegmental scarring/atelectasis  -Will get CT scan of the chest without contrast   -discussed with cardiology  -Continue with IV diuresis    Recurrent atrial fibrillation s/p DCCV and amiodarone with recent redo ablation on 5/2/24 with Dr. Day   -Continue with Xarelto and metoprolol 25 twice daily  -Repeat echo as above  -Continue to monitor on telemetry       Mildly elevated troponin, suspect demand/type II MI:  -Initial troponin was 90 and repeat was 85  -No evidence of any chest discomfort  -EKG on admit did not show any ST elevation  -Echo reviewed and detailed report report as above  -Continue to monitor on telemetry     History of prolonged QT interval  -He does have QT interval which runs on the higher side and EKG on admission was reviewed    Concern for amiodarone toxicity this past winter with hypoxia/lung issues  -  He had felt better since then before gaining weight/edema with his more recent volume issues.     Obstructive sleep apnea  -CPAP as per home regimen and encourage patient to be compliant     Gout:  -Continue pPTA  allopurinol    Discharge planning//barriers to discharge  -Patient is still needing a lot of oxygen and is being diuresed with IV medications and anticipate that he will be in the hospital for the next few days  -I  "do anticipate that he would not have any therapy needs and will be able to go home when medically stable in next two days           Diet: Combination Diet 2 gm NA Diet; No Caffeine Diet (and additional linked orders)  Fluid restriction 1800 ML FLUID (and additional linked orders)    DVT Prophylaxis: DOAC  Palmer Catheter: Not present  Lines: None     Cardiac Monitoring: ACTIVE order. Indication: Acute decompensated heart failure (48 hours)  Code Status: Full Code      Clinically Significant Risk Factors                    # Hypertension: Noted on problem list  # Acute heart failure with preserved ejection fraction: heart failure noted on problem list, last echo with EF >50%, and receiving IV diuretics       # Severe Obesity: Estimated body mass index is 44.34 kg/m  as calculated from the following:    Height as of this encounter: 1.727 m (5' 8\").    Weight as of this encounter: 132.3 kg (291 lb 9.6 oz)., PRESENT ON ADMISSION       # Financial/Environmental Concerns: none         Disposition Plan     Medically Ready for Discharge: Anticipated in 2-4 Days             Tawny Alcantar MD  Hospitalist Service    Securely message with [employer] (more info)  Text page via [employer] Paging/Directory   ______________________________________________________________________    Interval History     -Reviewed events and this morning he was briefly on room air but does get significantly short of breath with exertion  -No fever, chills, cough  -Did complete first part of the stress test  -Discussed results of the CT scan of the chest on 3/24 and also recent chest x-rays and discussion with cardiology and the plan for further workup and he increase  -He has been updating his wife     Physical Exam   Vital Signs: Temp: 98  F (36.7  C) Temp src: Oral BP: 108/67 Pulse: 68   Resp: (!) 76 SpO2: 93 % O2 Device: Nasal cannula Oxygen Delivery: 4 LPM  Weight: 291 lbs 9.6 oz        General: AOx3  Respiratory: CTLA " and minimal basilar crackles and is on nasal cannula oxygen at 4 L  Cardiovascular: Regular rate , S1 and S2 normal with no murmer or rubs or gallops and has edema over both lower extremity and seems to be less   Abdomen:   soft , non tender   Neurologic: No focal deficit  Musculoskeletal: Normal Range of motion over upper and lower extremities bilaterally   Psychiatric: cooperative     Medical Decision Making       Time spent in care of patient is 46 minutes and I reviewed labs including basic metabolic panel, x-ray and discussed plan of care in detail with the patient and nursing staff and also with the cardiology team and case management       Data     I have personally reviewed the following data over the past 24 hrs:    N/A  \   N/A   / N/A     135 98 23.0 /  106 (H)   4.0 27 1.05 \       Imaging results reviewed over the past 24 hrs:   Recent Results (from the past 24 hour(s))   XR Chest 2 Views    Narrative    CHEST TWO VIEWS 5/8/2024 8:10 AM     HISTORY: Congestive heart failure, atrial fibrillation, aortic  stenosis. Prior abnormal chest x-ray.     COMPARISON: Chest radiograph 5/5/2024       Impression    IMPRESSION: Overall similar interstitial opacities and mild bibasilar  haziness favored to reflect pulmonary edema, less likely sequelae of  infection. No new focal consolidation or significant pleural effusion.  Scattered subsegmental scarring/atelectasis. No pneumothorax. Similar  mildly enlarged heart size. Degenerative changes of the spine.     SEBASTIÁN CHIU MD         SYSTEM ID:  [id number]

## 2024-05-08 NOTE — PLAN OF CARE
Orientation: A/Ox4  Activity: Ind in room DELEON  Diet/BS Checks: Cardiac diet, no caffeine, 1800ml FR  Tele:  NSR w/ PVCs  IV Access/Drains: PIV SL  Pain Management: Denies  Abnormal VS/Results: VSS on 4L NC, 6L w/ activities  Bowel/Bladder: Continent, voiding well, receiving IV lasix BID, dose increased  Skin/Wounds: Sam BLE, +1-2 edema to both  Consults: Cardiololgy, OT  D/C Disposition: Pending diuresis  Other Info: Completed chest CT and xray, please see results. 1 of 2 part of lexisan stress test completed.

## 2024-05-08 NOTE — PLAN OF CARE
Orientation: A&Ox4  Activity: Ind  Diet/BS Checks: Cardiac, 1800mL fluid restriction   Tele: NSR with PVC's  IV Access/Drains: R PIV SL  Pain Management: Denies  Abnormal VS/Results: 4L O2, attempting to wean   K replacement protocol   Bowel/Bladder: Cont b/b  Skin/Wounds: BLE 1-2+ edema  Consults: Cardiology, OT  D/C Disposition: 2-4 Days to home    Other Info:   -Tentative plan for lexiscan stress test today pending O2 needs  -Strict I&O's

## 2024-05-09 ENCOUNTER — APPOINTMENT (OUTPATIENT)
Dept: NUCLEAR MEDICINE | Facility: CLINIC | Age: 74
DRG: 273 | End: 2024-05-09
Attending: NURSE PRACTITIONER
Payer: COMMERCIAL

## 2024-05-09 ENCOUNTER — APPOINTMENT (OUTPATIENT)
Dept: OCCUPATIONAL THERAPY | Facility: CLINIC | Age: 74
DRG: 273 | End: 2024-05-09
Payer: COMMERCIAL

## 2024-05-09 LAB
ANION GAP SERPL CALCULATED.3IONS-SCNC: 12 MMOL/L (ref 7–15)
BUN SERPL-MCNC: 26.1 MG/DL (ref 8–23)
C PNEUM DNA SPEC QL NAA+PROBE: NOT DETECTED
CALCIUM SERPL-MCNC: 9.4 MG/DL (ref 8.8–10.2)
CHLORIDE SERPL-SCNC: 95 MMOL/L (ref 98–107)
CREAT SERPL-MCNC: 1.02 MG/DL (ref 0.67–1.17)
CRP SERPL-MCNC: 100.61 MG/L
CV BLOOD PRESSURE: 56 MMHG
CV STRESS MAX HR HE: 77
DEPRECATED HCO3 PLAS-SCNC: 28 MMOL/L (ref 22–29)
EGFRCR SERPLBLD CKD-EPI 2021: 78 ML/MIN/1.73M2
ERYTHROCYTE [DISTWIDTH] IN BLOOD BY AUTOMATED COUNT: 13.9 % (ref 10–15)
ERYTHROCYTE [SEDIMENTATION RATE] IN BLOOD BY WESTERGREN METHOD: 54 MM/HR (ref 0–20)
FLUAV H1 2009 PAND RNA SPEC QL NAA+PROBE: NOT DETECTED
FLUAV H1 RNA SPEC QL NAA+PROBE: NOT DETECTED
FLUAV H3 RNA SPEC QL NAA+PROBE: NOT DETECTED
FLUAV RNA SPEC QL NAA+PROBE: NOT DETECTED
FLUBV RNA SPEC QL NAA+PROBE: NOT DETECTED
GLUCOSE SERPL-MCNC: 110 MG/DL (ref 70–99)
HADV DNA SPEC QL NAA+PROBE: NOT DETECTED
HCOV PNL SPEC NAA+PROBE: NOT DETECTED
HCT VFR BLD AUTO: 40.8 % (ref 40–53)
HGB BLD-MCNC: 13.6 G/DL (ref 13.3–17.7)
HMPV RNA SPEC QL NAA+PROBE: NOT DETECTED
HPIV1 RNA SPEC QL NAA+PROBE: NOT DETECTED
HPIV2 RNA SPEC QL NAA+PROBE: NOT DETECTED
HPIV3 RNA SPEC QL NAA+PROBE: NOT DETECTED
HPIV4 RNA SPEC QL NAA+PROBE: NOT DETECTED
M PNEUMO DNA SPEC QL NAA+PROBE: NOT DETECTED
MCH RBC QN AUTO: 33 PG (ref 26.5–33)
MCHC RBC AUTO-ENTMCNC: 33.3 G/DL (ref 31.5–36.5)
MCV RBC AUTO: 99 FL (ref 78–100)
PLATELET # BLD AUTO: 315 10E3/UL (ref 150–450)
POTASSIUM SERPL-SCNC: 3.6 MMOL/L (ref 3.4–5.3)
PROCALCITONIN SERPL IA-MCNC: 0.09 NG/ML
RATE PRESSURE PRODUCT: 8162
RBC # BLD AUTO: 4.12 10E6/UL (ref 4.4–5.9)
RSV RNA SPEC QL NAA+PROBE: NOT DETECTED
RSV RNA SPEC QL NAA+PROBE: NOT DETECTED
RV+EV RNA SPEC QL NAA+PROBE: NOT DETECTED
SARS-COV-2 RNA RESP QL NAA+PROBE: NEGATIVE
SODIUM SERPL-SCNC: 135 MMOL/L (ref 135–145)
STRESS ECHO BASELINE DIASTOLIC HE: 71
STRESS ECHO BASELINE HR: 76 BPM
STRESS ECHO BASELINE SYSTOLIC BP: 120
STRESS ECHO CALCULATED PERCENT HR: 52 %
STRESS ECHO LAST STRESS DIASTOLIC BP: 64
STRESS ECHO LAST STRESS SYSTOLIC BP: 106
STRESS ECHO TARGET HR: 148
WBC # BLD AUTO: 10.3 10E3/UL (ref 4–11)

## 2024-05-09 PROCEDURE — 250N000012 HC RX MED GY IP 250 OP 636 PS 637: Performed by: STUDENT IN AN ORGANIZED HEALTH CARE EDUCATION/TRAINING PROGRAM

## 2024-05-09 PROCEDURE — 250N000011 HC RX IP 250 OP 636: Performed by: NURSE PRACTITIONER

## 2024-05-09 PROCEDURE — 87635 SARS-COV-2 COVID-19 AMP PRB: CPT | Performed by: HOSPITALIST

## 2024-05-09 PROCEDURE — 250N000013 HC RX MED GY IP 250 OP 250 PS 637: Performed by: INTERNAL MEDICINE

## 2024-05-09 PROCEDURE — 99222 1ST HOSP IP/OBS MODERATE 55: CPT | Performed by: STUDENT IN AN ORGANIZED HEALTH CARE EDUCATION/TRAINING PROGRAM

## 2024-05-09 PROCEDURE — 343N000001 HC RX 343: Performed by: INTERNAL MEDICINE

## 2024-05-09 PROCEDURE — 82374 ASSAY BLOOD CARBON DIOXIDE: CPT | Performed by: INTERNAL MEDICINE

## 2024-05-09 PROCEDURE — 97110 THERAPEUTIC EXERCISES: CPT | Mod: GO

## 2024-05-09 PROCEDURE — 120N000001 HC R&B MED SURG/OB

## 2024-05-09 PROCEDURE — 36415 COLL VENOUS BLD VENIPUNCTURE: CPT | Performed by: INTERNAL MEDICINE

## 2024-05-09 PROCEDURE — 85652 RBC SED RATE AUTOMATED: CPT | Performed by: STUDENT IN AN ORGANIZED HEALTH CARE EDUCATION/TRAINING PROGRAM

## 2024-05-09 PROCEDURE — 86140 C-REACTIVE PROTEIN: CPT | Performed by: STUDENT IN AN ORGANIZED HEALTH CARE EDUCATION/TRAINING PROGRAM

## 2024-05-09 PROCEDURE — 250N000011 HC RX IP 250 OP 636: Mod: JZ | Performed by: INTERNAL MEDICINE

## 2024-05-09 PROCEDURE — 84145 PROCALCITONIN (PCT): CPT | Performed by: INTERNAL MEDICINE

## 2024-05-09 PROCEDURE — 93016 CV STRESS TEST SUPVJ ONLY: CPT | Performed by: INTERNAL MEDICINE

## 2024-05-09 PROCEDURE — 99233 SBSQ HOSP IP/OBS HIGH 50: CPT | Performed by: HOSPITALIST

## 2024-05-09 PROCEDURE — 84433 ASY THIOPURIN S-MTHYLTRNSFRS: CPT | Performed by: STUDENT IN AN ORGANIZED HEALTH CARE EDUCATION/TRAINING PROGRAM

## 2024-05-09 PROCEDURE — 87581 M.PNEUMON DNA AMP PROBE: CPT | Performed by: HOSPITALIST

## 2024-05-09 PROCEDURE — 99233 SBSQ HOSP IP/OBS HIGH 50: CPT | Mod: 25 | Performed by: NURSE PRACTITIONER

## 2024-05-09 PROCEDURE — A9500 TC99M SESTAMIBI: HCPCS | Performed by: INTERNAL MEDICINE

## 2024-05-09 PROCEDURE — 999N000049 HC STATISTIC ECHO STRESS OR NM NPI

## 2024-05-09 PROCEDURE — 85027 COMPLETE CBC AUTOMATED: CPT | Performed by: INTERNAL MEDICINE

## 2024-05-09 PROCEDURE — 97535 SELF CARE MNGMENT TRAINING: CPT | Mod: GO

## 2024-05-09 RX ORDER — ACYCLOVIR 200 MG/1
0-1 CAPSULE ORAL
Status: DISCONTINUED | OUTPATIENT
Start: 2024-05-09 | End: 2024-05-09

## 2024-05-09 RX ORDER — ALBUTEROL SULFATE 90 UG/1
2 AEROSOL, METERED RESPIRATORY (INHALATION) EVERY 5 MIN PRN
Status: DISCONTINUED | OUTPATIENT
Start: 2024-05-09 | End: 2024-05-09

## 2024-05-09 RX ORDER — PREDNISONE 20 MG/1
60 TABLET ORAL DAILY
Status: DISCONTINUED | OUTPATIENT
Start: 2024-05-09 | End: 2024-05-13

## 2024-05-09 RX ORDER — REGADENOSON 0.08 MG/ML
0.4 INJECTION, SOLUTION INTRAVENOUS ONCE
Status: COMPLETED | OUTPATIENT
Start: 2024-05-09 | End: 2024-05-09

## 2024-05-09 RX ORDER — CAFFEINE CITRATE 20 MG/ML
60 SOLUTION INTRAVENOUS
Status: DISCONTINUED | OUTPATIENT
Start: 2024-05-09 | End: 2024-05-09

## 2024-05-09 RX ORDER — AMINOPHYLLINE 25 MG/ML
50-100 INJECTION, SOLUTION INTRAVENOUS
Status: DISCONTINUED | OUTPATIENT
Start: 2024-05-09 | End: 2024-05-09

## 2024-05-09 RX ORDER — SULFAMETHOXAZOLE/TRIMETHOPRIM 800-160 MG
1 TABLET ORAL DAILY
Status: DISCONTINUED | OUTPATIENT
Start: 2024-05-10 | End: 2024-05-16 | Stop reason: HOSPADM

## 2024-05-09 RX ADMIN — RIVAROXABAN 20 MG: 20 TABLET, FILM COATED ORAL at 16:05

## 2024-05-09 RX ADMIN — LOSARTAN POTASSIUM 50 MG: 25 TABLET, FILM COATED ORAL at 09:09

## 2024-05-09 RX ADMIN — POTASSIUM CHLORIDE 40 MEQ: 1500 TABLET, EXTENDED RELEASE ORAL at 18:41

## 2024-05-09 RX ADMIN — METOPROLOL TARTRATE 25 MG: 25 TABLET, FILM COATED ORAL at 09:10

## 2024-05-09 RX ADMIN — POTASSIUM CHLORIDE 40 MEQ: 1500 TABLET, EXTENDED RELEASE ORAL at 09:10

## 2024-05-09 RX ADMIN — REGADENOSON 0.4 MG: 0.08 INJECTION, SOLUTION INTRAVENOUS at 10:16

## 2024-05-09 RX ADMIN — METOLAZONE 2.5 MG: 2.5 TABLET ORAL at 09:10

## 2024-05-09 RX ADMIN — FUROSEMIDE 60 MG: 10 INJECTION, SOLUTION INTRAMUSCULAR; INTRAVENOUS at 18:40

## 2024-05-09 RX ADMIN — Medication 30 MILLICURIE: at 10:15

## 2024-05-09 RX ADMIN — PREDNISONE 60 MG: 20 TABLET ORAL at 16:05

## 2024-05-09 RX ADMIN — FINASTERIDE 1 MG: 1 TABLET, FILM COATED ORAL at 09:10

## 2024-05-09 RX ADMIN — FUROSEMIDE 60 MG: 10 INJECTION, SOLUTION INTRAMUSCULAR; INTRAVENOUS at 09:10

## 2024-05-09 RX ADMIN — PANTOPRAZOLE SODIUM 40 MG: 40 TABLET, DELAYED RELEASE ORAL at 06:55

## 2024-05-09 RX ADMIN — METOPROLOL TARTRATE 25 MG: 25 TABLET, FILM COATED ORAL at 21:44

## 2024-05-09 RX ADMIN — ALLOPURINOL 300 MG: 300 TABLET ORAL at 09:10

## 2024-05-09 ASSESSMENT — ACTIVITIES OF DAILY LIVING (ADL)
ADLS_ACUITY_SCORE: 27
ADLS_ACUITY_SCORE: 20
ADLS_ACUITY_SCORE: 24
ADLS_ACUITY_SCORE: 27
ADLS_ACUITY_SCORE: 24
ADLS_ACUITY_SCORE: 24
ADLS_ACUITY_SCORE: 20
ADLS_ACUITY_SCORE: 27
ADLS_ACUITY_SCORE: 24
ADLS_ACUITY_SCORE: 27
ADLS_ACUITY_SCORE: 26
ADLS_ACUITY_SCORE: 27
ADLS_ACUITY_SCORE: 26

## 2024-05-09 NOTE — PROGRESS NOTES
[facility]    Cardiology Progress Note    Primary Cardiologist: Dr. Day (EP)    Date of Admission: 5/5/2024  Service Date: 05/09/24    Summary:  Mr. Mike Schultz is a very pleasant 73 year old male with a past medical history of alcohol abuse, symptomatic atrial fibrillation refractory to medical therapy (Flecainide) s/p PVI in 2017 with re-isolation of reconnected PVs in 2019, recurrent AF s/p DCCV and amiodarone (discontinued with discovery of pneumonitis 11/2023), HFpEF and is s/p recent redo ablation on 5/2  who was admitted on 5/5/2024 for worsening shortness of breath. Cardiology was consulted for heart failure.    Interval History   Patient had a lexiscan nuclear stress test completed today which showed a small area of apical ischemia and infarct in the basal inferolateral segments of the LV with iva-infarct ischemia in the basal to mid inferior wall.     Chest CT with findings concerning for recurrent inflammatory process, discussed with pulmonology who recommends institution of prednisone therapy.     Patient with continued exertional shortness of breath as well as requiring supplemental oxygen to maintain saturations. Denies orthopnea or chest pain. Diuresed well yesterday with increased dose of furosemide, net negative 3.2 liters. BMP stable.     Telemetry: Sinus rhythm with occasional PVC's, rate 70's    Assessment & Plan   Acute on chronic HFpEF         Hx of lung amiodarone toxicity, concern for recurrent inflammatory lung process  - Hospitalized in Feb 2024 with multiple winter/spring 2024 clinic visits for medication titration   - LVEF: 55% (5/2024)  - NYHA class III  - Fluid status: mild hypervolemia; suspected dry weight: unclear, likely ~290-295#, possibly lower   - Diuretic regimen: PTA torsemide 80mg in am and 40mg in pm   - Guideline directed medical therapy:  - Beta blocker: Metoprolol 25mg BID  - ACEI/ARB/ARNI: Losartan 50mg daily    - Aldactone antagonist:  None, previously on 12.5mg daily, which was stopped due to worsened renal function   - SGLT2 inhibitor: None  -Chest xray on admission with pulmonary interstitium which can be seen with airway inflammation or edema, no pleural effusion or pneumothorax, some patchy opacity involving the retrocardiac region of the left lung base as well as the lateral aspect of the right middle lobe suspicious for infection  -Repeat chest x-ray showing similar interstitial opacities and mild bibasilar haziness favored to reflect pulmonary edema. No new focal consolidation or pleural effusion  -Chest CT yesterday showing extensive interstitial and groundglass opacities have developed in both lungs compatible with pneumonia, enlarged mediastinal ,lymph nodes  -Pulmonology following and recommending prednisone treatment with close monitoring of response, favoring recurrent inflammatory process vs infection     2. Recurrent atrial fibrillation s/p DCCV and amiodarone with recent redo ablation on 5/2/24 with Dr. Day  - POC echo in ER without evidence of pericardial effusion   -Anticoagulated with xarelto     3. Elevated troponin  - Flat trend 90-85, unlikely to be ACS given absence of delta rise and anginal symptoms, most likely related to being s/p ablation     4. Moderate aortic stenosis  - Stable on TTE with a mean gradient of 35 mmHg (stable when compared to February 2024), however heavily calcified     5. Hx of alcohol abuse  - Precipitating atrial fibrillation recurrences     Plan:   1. Continue IV Furosemide 60mg BID x 1 more day, reassess diuretic dosing tomorrow   2. Continue PTA medications including metoprolol, losartan, and xarelto  3. Will eventually need right and left heart catheterization, however unclear whether this will be able to be completed inpatient vs outpatient given respiratory status and recurrent inflammatory lung process   4.  Cardiology will continue to follow    Thank you for the opportunity to participate  in this pleasant patient's care.     AYAN Manzanares, CNP   Nurse Practitioner  [facility]  Pager: [phone]  (8am - 5pm, M-F)    Patient Active Problem List   Diagnosis    Essential hypertension    Impotence of organic origin    Allergic rhinitis    Herpes simplex virus (HSV) infection    Class 3 obesity (H)    Tobacco use disorder, moderate, in sustained remission    Atrial fibrillation, permanent (H)    Advanced directives, counseling/discussion    Family history of ischemic heart disease    Gout    BOWEN (obstructive sleep apnea)    Ascending aorta dilatation (H24)    Impaired fasting glucose    Aortic valve stenosis    Venous insufficiency    Bilateral carpal tunnel syndrome    Other cardiomyopathies (H)    Leg wound, right    Lower extremity edema    Ulcer of lower limb, unspecified laterality, with fat layer exposed (H)    Acute respiratory failure with hypoxia (H)    Acute on chronic congestive heart failure, unspecified heart failure type (H)       Physical Exam   Temp: 98  F (36.7  C) Temp src: Oral BP: 101/64 Pulse: 70   Resp: 18 SpO2: 95 % O2 Device: None (Room air) Oxygen Delivery: 4 LPM  Vitals:    05/06/24 0600 05/07/24 0630 05/08/24 1004   Weight: 133 kg (293 lb 3.2 oz) 132.2 kg (291 lb 8 oz) 132.3 kg (291 lb 9.6 oz)     Vital Signs with Ranges  Temp:  [98  F (36.7  C)-98.2  F (36.8  C)] 98  F (36.7  C)  Pulse:  [69-84] 70  Resp:  [18] 18  BP: ()/(58-79) 101/64  SpO2:  [92 %-96 %] 95 %  I/O last 3 completed shifts:  In: 630 [P.O.:630]  Out: 4450 [Urine:4450]    Constitutional:  Appears his stated age, well nourished, and in no acute distress.  Eyes: Pupils equal, round. Sclerae anicteric.   HEENT: Normocephalic, atraumatic.   Neck: Supple.   Respiratory: Breathing non-labored. Lungs clear to auscultation bilaterally. Fine crackles on expiration throughout.   Cardiovascular: Regular rate and rhythm, normal S1 and S2.  III/VI systolic murmur  GI: Soft, non-distended,  non-tender, bowel sounds present in all four quadrants.  Skin: Warm, dry.   Musculoskeletal/Extremities: Moves all extremities well and symmetrically. Trace bilateral LE edema  Neurologic: No gross focal deficits. Alert, awake, and oriented to person, place and time.  Psychiatric: Affect appropriate. Mentation normal.    Medications   Current Facility-Administered Medications   Medication Dose Route Frequency Provider Last Rate Last Admin    - MEDICATION INSTRUCTIONS -   Does not apply DOES NOT GO TO Rico Herring DO        Continuing ACE inhibitor/ARB/ARNI from home medication list OR ACE inhibitor/ARB/ARNI order already placed during this visit   Does not apply DOES NOT GO TO Rico Herring,         Continuing beta blocker from home medication list OR beta blocker order already placed during this visit   Does not apply DOES NOT GO TO Rico Herring DO        Patient is already receiving anticoagulation with heparin, enoxaparin (LOVENOX), warfarin (COUMADIN)  or other anticoagulant medication   Does not apply Continuous PRN Rico Lemus, DO         Current Facility-Administered Medications   Medication Dose Route Frequency Provider Last Rate Last Admin    allopurinol (ZYLOPRIM) tablet 300 mg  300 mg Oral Daily Rico Lemus, DO   300 mg at 05/09/24 0910    finasteride (PROPECIA) tablet 1 mg  1 mg Oral Daily Rico Lemus, DO   1 mg at 05/09/24 0910    furosemide (LASIX) injection 60 mg  60 mg Intravenous BID Marta Davila NP   60 mg at 05/09/24 0910    losartan (COZAAR) tablet 50 mg  50 mg Oral Daily Rico Lemus, DO   50 mg at 05/09/24 0909    metolazone (ZAROXOLYN) tablet 2.5 mg  2.5 mg Oral Daily Anastacio Morrissey MD   2.5 mg at 05/09/24 0910    metoprolol tartrate (LOPRESSOR) tablet 25 mg  25 mg Oral BID Rico Lemus, DO   25 mg at 05/09/24 0910    pantoprazole (PROTONIX) EC tablet 40 mg  40 mg Oral QAM AC Rico Lemus, DO   40 mg at 05/09/24 0655    potassium chloride  pushpa ER (KLOR-CON M20) CR tablet 40 mEq  40 mEq Oral BID Tarlton, Rico P, DO   40 mEq at 05/09/24 0910    rivaroxaban ANTICOAGULANT (XARELTO) tablet 20 mg  20 mg Oral Daily with supper Allan, Rico P, DO   20 mg at 05/08/24 1704    sodium chloride (PF) 0.9% PF flush 3 mL  3 mL Intracatheter Q8H Allan, Irco P, DO   3 mL at 05/09/24 0505       Data   Recent Results (from the past 24 hour(s))   CT Chest w/o Contrast    Narrative    EXAM: CT CHEST W/O CONTRAST  LOCATION: [facility]  DATE: 5/8/2024    INDICATION: Ongoing shortness of breath with x ray mentioning about left retrocardiac opacity  COMPARISON: Chest x-ray 05/05/2024 and CT chest exam 3/11/2024  TECHNIQUE: CT chest without IV contrast. Multiplanar reformats were obtained. Dose reduction techniques were used.  CONTRAST: None.    FINDINGS:   LUNGS AND PLEURA: Extensive interstitial and groundglass opacities throughout both upper and lower lobes. No pleural effusions. No pneumothorax.    MEDIASTINUM/AXILLAE: Numerous enlarged mediastinal lymph nodes. No thoracic aortic aneurysm.    CORONARY ARTERY CALCIFICATION: Moderate.    UPPER ABDOMEN: Atherosclerotic disease abdominal aorta.    MUSCULOSKELETAL: Extensive hypertrophic changes thoracic spine.      Impression    IMPRESSION:   1.  Extensive interstitial and groundglass opacities have developed in both lungs compatible with pneumonia. Covid 19 possible.  2.  Enlarged mediastinal nodes are likely reactive. Recommend follow-up CT chest exam after therapy to confirm resolution.     NM Lexiscan stress test (nuc card)   Result Value    Target     Baseline Systolic     Baseline Diastolic BP 71    Last Stress Systolic     Last Stress Diastolic BP 64    Baseline HR 76    Max HR  77    Max Predicted HR  52    Rate Pressure Product 8,162.0       Recent Labs   Lab 05/09/24  0800 05/05/24  1003   WBC 10.3 11.1*   HGB 13.6 13.7   HCT 40.8 41.5   MCV 99 100    261      Recent Labs   Lab 05/09/24  0800 05/08/24  1258 05/07/24  2347    137 135   POTASSIUM 3.6 3.7 4.0   CHLORIDE 95* 98 98   CO2 28 27 27   ANIONGAP 12 12 10   * 108* 106*   BUN 26.1* 22.5 23.0   CR 1.02 1.06 1.05   GFRESTIMATED 78 75 75   ASHELY 9.4 9.1 9.1        This note was completed in part using Dragon voice recognition software. Although reviewed after completion, some word and grammatical errors may occur.

## 2024-05-09 NOTE — PLAN OF CARE
Goal Outcome Evaluation:    5/8/24  [id number]    Orientation: A/Ox4  Activity: Ind in the room  Diet/BS Checks: Cardiac diet, FR 1800ml  Tele:  NSR  IV Access/Drains: L PIV SL  Pain Management: denies  Abnormal VS/Results: VSS,o2 NC at 4LPM  Bowel/Bladder: cont both, uses urinal at bedside, BM 1x  Skin/Wounds: scattered bruising, BLE edema +1/+2   Consults: Cardio  D/C Disposition: pending  Other Info: Chest CT results in-MD notified-ordered Procalcitonin and CBC for AM draw. On K protocol, repeat draws in AM. Covid 19 and Respiratory panel swab done-see results. 1 of 2 part of lexisan stress test completed-see cardiology notes

## 2024-05-09 NOTE — CONSULTS
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     Acute Hypoxic Respiratory Failure  Bilateral Ground Glass Opacities  Hx Amiodarone Pneumonitis  BOWEN on CPAP  Recent ablation for Afib with continued dyspnea and ultimately oxygen requirement. 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 At that time infectious including fungal workup negative. Limited initial response to 40 mg daily of prednisone but improvement on 60 mg. Currently viral testing negative, normal WBC, procal negative, no significant cough or fever and believe infection less likely. Has been diuresed effectively without any impact on symptoms. CRP and ESR both elevated and was tapered off steroids in March. No imaging since mid March so unknown timeline for development of infiltrates. Believe relapse of inflammatory process is most likely and would empirically resume prednisone at 60 mg daily. Can continue for several days then assess symptoms, O2 needs and possibly repeat imaging. Discussed with Cardiology who agree with steroids. Steroids could exacerbate Afib and CHF. If lungs response to anti-inflammatory therapy could discuss steroid sparing therapy.  - Prednisone 60 mg daily  - Bactrim prophylaxis ordered in anticipation of  prolonged therapy  - Will send TPMT in case needing steroid alternative in future    Lubna Mejias MD PhD  Pulmonary and Critical Care     History of Present Illness:    HPI: History taken from patient and chart review.     History of Afib refractory to medical therapy and s/p multiple ablations. Reports was on amiodarone on multiple occasions after ablations, last in Oct 2023. Following ablation continued to have shortness of breath, admitted to [facility] 11/25/23 and CT concerning for pneumonitis presumed secondary to amiodarone. Started on steroids that had to be increased from 40 to 60 mg. Ultimately remained on slow taper through mid March alongside PJP prophylaxis. CT imaging in March greatly improved from prior. Again underwent ablation on 5/2 and as with prior continued to have shortness of breath following procedure. Exertional dyspnea not improved by additional diuresis. Admitted 5/5 and diuresed by Cardiology service without improvement. CT Chest performed with bilateral interstitial and ground glass opacities. Former smoker, no vaping, once in a while did marijuana. No mold or water damage at home.     Review of Systems:  Complete 12 point ROS negative unless mentioned in HPI    Histories, Prior to Admission Medications, Allergies:    Past Medical History:  Past Medical History:   Diagnosis Date    (HFpEF) heart failure with preserved ejection fraction (H)     Acute gouty arthritis     Alcohol use disorder in remission     Amiodarone pulmonary toxicity     Aortic stenosis     Basal cell carcinoma     Of the nose    Bell's palsy     Herpes simplex without mention of complication     Hyperlipidemia     Hypertension 2000    Persistent atrial fibrillation (H)     persistent, DCCV 12/2017, ablation 11/6/17    Pulmonary nodules     Sleep apnea     CPAP     Past Surgical History:  Past Surgical History:   Procedure Laterality Date    ABLATION OF DYSRHYTHMIC FOCUS  2018, 04/12/2019    Procedure: EP Ablation  Focal AFIB;  Surgeon: Fady Day MD;  Location:  HEART CARDIAC CATH LAB    ACHILLES TENDON SURGERY  1997    ANESTHESIA CARDIOVERSION N/A 2/26/2019    Procedure: ANESTHESIA CARDIOVERSION (CONNER);  Surgeon: GENERIC ANESTHESIA PROVIDER;  Location:  OR    ANESTHESIA CARDIOVERSION N/A 11/19/2020    Procedure: ANESTHESIA, FOR CARDIOVERSION (dr campbell);  Surgeon: GENERIC ANESTHESIA PROVIDER;  Location:  OR    ANESTHESIA CARDIOVERSION N/A 8/22/2022    Procedure: CARDIOVERSION;  Surgeon: GENERIC ANESTHESIA PROVIDER;  Location:  OR    ANESTHESIA CARDIOVERSION N/A 9/13/2023    Procedure: Anesthesia cardioversion;  Surgeon: GENERIC ANESTHESIA PROVIDER;  Location:  OR    ANESTHESIA CARDIOVERSION N/A 10/6/2023    Procedure: Anesthesia cardioversion;  Surgeon: GENERIC ANESTHESIA PROVIDER;  Location:  OR    BASAL CELL CARCINOMA EXCISION Right 2009    nose, took cartilage from inner ear.    CARDIOVERSION  2012, 2019    cardioversion for atr fib    COLONOSCOPY  2008    COLONOSCOPY N/A 3/22/2023    Procedure: COLONOSCOPY, FLEXIBLE, WITH LESION REMOVAL USING SNARE;  Surgeon: Anne Lucero MD;  Location:  GI    EP ABLATION FOCAL AFIB N/A 4/12/2019    Procedure: EP Ablation Focal AFIB;  Surgeon: Fady Day MD;  Location:  HEART CARDIAC CATH LAB    EP ABLATION FOCAL AFIB N/A 5/2/2024    Procedure: Ablation Atrial Fibrilation;  Surgeon: Fady Day MD;  Location:  HEART CARDIAC CATH LAB    HERNIA REPAIR, UMBILICAL  08/20/2012    Procedure: HERNIORRHAPHY UMBILICAL;  UMBILICAL HERNIA REPAIR;  Surgeon: Ra Crocker MD;  Location: [facility]    HERNIORRHAPHY UMBILICAL  8/20/2012    Procedure: HERNIORRHAPHY UMBILICAL;  UMBILICAL HERNIA REPAIR;  Surgeon: Ra Crocker MD;  Location: [facility]    ORTHOPEDIC SURGERY      ORTHOPEDIC SURGERY      OTHER SURGICAL HISTORY Right 03/2017    total KNEE ARTHROSCOPY     TOTAL KNEE ARTHROPLASTY Left 03/16/2018    ZZC NONSPECIFIC PROCEDURE  1997    achilles tendon     ZZC NONSPECIFIC PROCEDURE      knees, arthroscopy    ZZC NONSPECIFIC PROCEDURE      basal cell skin ca, nose    ZZC NONSPECIFIC PROCEDURE      flex sig; colonoscopy due 3/2008    ZZC NONSPECIFIC PROCEDURE      cardioversion for atr fib    ZZC TOTAL KNEE ARTHROPLASTY Left 2018    Dr. Malick Suarez     Past Social History:  Social History     Socioeconomic History    Marital status: Single     Spouse name: Not on file    Number of children: Not on file    Years of education: Not on file    Highest education level: Not on file   Occupational History    Not on file   Tobacco Use    Smoking status: Former     Current packs/day: 0.00     Average packs/day: 1 pack/day for 34.6 years (34.6 ttl pk-yrs)     Types: Cigarettes     Start date:      Quit date: 2011     Years since quittin.7     Passive exposure: Past    Smokeless tobacco: Never    Tobacco comments:     quit    Vaping Use    Vaping status: Never Used   Substance and Sexual Activity    Alcohol use: Not Currently    Drug use: Never    Sexual activity: Not Currently     Partners: Female     Birth control/protection: None   Other Topics Concern    Parent/sibling w/ CABG, MI or angioplasty before 65F 55M? Not Asked   Social History Narrative    Worked in retail () and insurance sales     Spends some time on [place] on [place]     Social Determinants of Health     Financial Resource Strain: Not on file   Food Insecurity: Not on file   Transportation Needs: Not on file   Physical Activity: Not on file   Stress: Not on file   Social Connections: Not on file   Interpersonal Safety: Not on file   Housing Stability: Not on file     Family History:  Family History   Problem Relation Age of Onset    Family History Negative Mother     Heart Disease Father         decesased at 42 - MI    Heart Disease Sister          MI    Lipids Sister     Lipids Sister     No Known Problems Sister     Heart Disease Brother          MI     Heart Disease Brother         CABG AT 49    Lung Cancer No family hx of      Medications:  Current Facility-Administered Medications   Medication Dose Route Frequency Provider Last Rate Last Admin    allopurinol (ZYLOPRIM) tablet 300 mg  300 mg Oral Daily Rico Lemus P, DO   300 mg at 05/09/24 0910    finasteride (PROPECIA) tablet 1 mg  1 mg Oral Daily Rico Lemus P, DO   1 mg at 05/09/24 0910    furosemide (LASIX) injection 60 mg  60 mg Intravenous BID Marta Davila NP   60 mg at 05/09/24 0910    losartan (COZAAR) tablet 50 mg  50 mg Oral Daily Rico Lemus, DO   50 mg at 05/09/24 0909    metolazone (ZAROXOLYN) tablet 2.5 mg  2.5 mg Oral Daily Anastacio Morrissey MD   2.5 mg at 05/09/24 0910    metoprolol tartrate (LOPRESSOR) tablet 25 mg  25 mg Oral BID Rico Lemus P, DO   25 mg at 05/09/24 0910    pantoprazole (PROTONIX) EC tablet 40 mg  40 mg Oral QAM AC Rico Lemus P, DO   40 mg at 05/09/24 0655    potassium chloride pushpa ER (KLOR-CON M20) CR tablet 40 mEq  40 mEq Oral BID Rico Lemus P, DO   40 mEq at 05/09/24 0910    rivaroxaban ANTICOAGULANT (XARELTO) tablet 20 mg  20 mg Oral Daily with supper Rico Lemus P, DO   20 mg at 05/08/24 1704    sodium chloride (PF) 0.9% PF flush 3 mL  3 mL Intracatheter Q8H Rico Lemus P, DO   3 mL at 05/09/24 0505     Current Facility-Administered Medications   Medication Dose Route Frequency Provider Last Rate Last Admin    - MEDICATION INSTRUCTIONS -   Does not apply DOES NOT GO TO Rico Herring P, DO        acetaminophen (TYLENOL) tablet 650 mg  650 mg Oral Q4H PRN Rico Lemus P, DO        Or    acetaminophen (TYLENOL) Suppository 650 mg  650 mg Rectal Q4H PRN iRco Lemus P, DO        Continuing ACE inhibitor/ARB/ARNI from home medication list OR ACE inhibitor/ARB/ARNI order already placed during this visit   Does not apply DOES NOT GO TO Rico Herring, DO        Continuing beta blocker from home medication list OR beta blocker order already  placed during this visit   Does not apply DOES NOT GO TO Trae Herringn P, DO        lidocaine (LMX4) cream   Topical Q1H PRN Allan, Rico P, DO        lidocaine 1 % 0.1-1 mL  0.1-1 mL Other Q1H PRN Allan, Rico P, DO        melatonin tablet 1 mg  1 mg Oral At Bedtime PRN Allan, Rico P, DO        ondansetron (ZOFRAN ODT) ODT tab 4 mg  4 mg Oral Q6H PRN Allan, Rico P, DO        Or    ondansetron (ZOFRAN) injection 4 mg  4 mg Intravenous Q6H PRN Allan, Rico P, DO        Patient is already receiving anticoagulation with heparin, enoxaparin (LOVENOX), warfarin (COUMADIN)  or other anticoagulant medication   Does not apply Continuous PRN Allan, Rico P, DO        senna-docusate (SENOKOT-S/PERICOLACE) 8.6-50 MG per tablet 1 tablet  1 tablet Oral BID PRN Trae Lemusn P, DO        Or    senna-docusate (SENOKOT-S/PERICOLACE) 8.6-50 MG per tablet 2 tablet  2 tablet Oral BID PRN Allan, Rico P, DO        sodium chloride (PF) 0.9% PF flush 10 mL  10 mL Intravenous Q10 Min PRN Rocky Marinelli MD        sodium chloride (PF) 0.9% PF flush 3 mL  3 mL Intracatheter q1 min prn Trae Lemusn P, DO        sodium chloride (PF) 0.9% PF flush 5-10 mL  5-10 mL Intravenous q1 min prn Rocky Marinelli MD         Allergies:     Allergies   Allergen Reactions    Amiodarone Shortness Of Breath     Suspected amiodarone toxicity involving lungs     Physical Exam:    Temp:  [98  F (36.7  C)-98.2  F (36.8  C)] 98  F (36.7  C)  Pulse:  [69-84] 70  Resp:  [18] 18  BP: ()/(58-79) 101/64  SpO2:  [92 %-96 %] 95 %    Intake/Output Summary (Last 24 hours) at 5/9/2024 1545  Last data filed at 5/9/2024 1000  Gross per 24 hour   Intake 945 ml   Output 3600 ml   Net -2655 ml       General: Sitting up at side of bed in NAD  HEENT: anicteric, moist mucosa  Chest: CTAB, slight crackles posteriorly, no wheezing  Cardiac: RRR no murmurs  Abdomen: Soft, flat, non tender, active BS  Extremities: No LE Edema  Neuro: A&Ox3, no focal deficits    Skin: no rash noted    Laboratory, imaging, and microbiologic data:    All laboratory and imaging data reviewed, pertinent results discussed above.

## 2024-05-09 NOTE — PROGRESS NOTES
[facility]    Medicine Progress Note - Hospitalist Service    Date of Admission:  5/5/2024    Assessment & Plan     This is a 73-year-old male with medical history which includes obesity, obstructive sleep apnea on CPAP,atrial fibrillation refractory to medical therapy (Flecainide) s/p PVI in 2017 with re-isolation of reconnected PVs in 2019, recurrent AF s/p DCCV and amiodarone, HFpEF and is s/p recent redo ablation on 5/2 who presented to the ED  on 5/5/24 with shortness of breath and was diagnosed with acute hypoxic respiratory failure due to pulmonary edema    Acute on chronic heart failure with preserved ejection fraction  Acute hypoxic respiratory failure due to above  Moderate aortic stenosis  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 this past winter with hypoxia/lung issues  History of lung  -Last echo in 2/2024 showed EF of 55 to 60%  -Patient has been admitted in November 2023 in February 2023 for a dyspnea and workup in November including CT scan of the chest showed widespread pneumonitis 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 and it was stopped and patient was started on 60 mg prednisone and was on long taper which she finished at the end of March 2024  -Outpatient PFTs in 12/23 showed mild restriction  -Patient again was admitted in February 2023 for dyspnea along with anasarca and was diuresed during that hospital stay  -Saw pulmonary on 3/  -- CT chest on 3/11/24 showed improvement in bilateral interstitial pulmonary infiltrates with likely component of fibrosis   -Last pulmonary visit on 3/11/24 indicated to continue the steroid taper by end of March and be compliant with    -5/2/24- admission with shortness of breath and workup on admission was consistent with elevated proBNP of 1202, EKG no ST elevation and did increase the dose of diuretics at home but failed as outpatient    -Chest x-ray - 5/5/2024 showed diffuse indistinctness of the pulmonary  interstitium which can be seen with air inflammation or edema and  concern for patchy opacity involving the retrocardiac region of the lung base and lateral aspect of right middle lobe concerning for infection  -Echo 5/6/2024-EF of 55%, mild LVH, moderate aortic stenosis, no pericardial effusion, grade 3 or advanced diastolic dysfunction and not able to assess regional wall motion changes  -Chest x-ray 5/8-similar interstitial opacities and mild basilar haziness favored to represent pulmonary edema   --Nuclear stress test part one done and to be completed on 5/9  -CT Chest 5/8-extensive interstitial and groundglass and enlarged mediastinal nodes which are likely reactive  -5/7-respiratory panel and COVID-19 negative  -Net -7.6 L since admission  -WBC and Pro-Manas normal without any evidence of fever during this hospital stay  -CRP elevated at 100.6  -ESR is also elevated at 54  -Pulmonary team consulted and they do not think patient has underlying infection and he has somewhat inflammatory pathology in the lung and they will see the patient and start him on steroids  -Continue with IV diuresis  --discussed with cardiology and pulmonary    Recurrent atrial fibrillation s/p DCCV and amiodarone with recent redo ablation on 5/2/24 with Dr. Day   -Continue with Xarelto and metoprolol 25 twice daily  -Repeat echo as above  -Continue to monitor on telemetry       Mildly elevated troponin, suspect demand/type II MI  -Initial troponin was 90 and repeat was 85  -No evidence of any chest discomfort  -EKG no ischemic change  -Echo reviewed and detailed report report as above  - Nuclear stress is abnormal and cards following  -Continue to monitor on telemetry     History of prolonged QT interval  -He does have QT interval which runs on the higher side and EKG on admission was reviewed    Obstructive sleep apnea  -CPAP as per home regimen and encourage patient to be compliant     Gout:  -Continue pPTA  allopurinol    Family  "communication  - I did speak with juan at 312 pm and she was updated and questions answered    Discharge planning//barriers to discharge  -Patient is still needing a lot of oxygen and is being diuresed with IV medications and anticipate that he will be in the hospital for the next few days  -I do anticipate that he would not have any therapy needs and will be able to go home when medically stable           Diet: Combination Diet 2 gm NA Diet; No Caffeine Diet (and additional linked orders)  Fluid restriction 1800 ML FLUID (and additional linked orders)    DVT Prophylaxis: DOAC  Palmer Catheter: Not present  Lines: None     Cardiac Monitoring: ACTIVE order. Indication: Acute decompensated heart failure (48 hours)  Code Status: Full Code      Clinically Significant Risk Factors                    # Hypertension: Noted on problem list  # Acute heart failure with preserved ejection fraction: heart failure noted on problem list, last echo with EF >50%, and receiving IV diuretics       # Severe Obesity: Estimated body mass index is 44.34 kg/m  as calculated from the following:    Height as of this encounter: 1.727 m (5' 8\").    Weight as of this encounter: 132.3 kg (291 lb 9.6 oz)., PRESENT ON ADMISSION       # Financial/Environmental Concerns: none         Disposition Plan     Medically Ready for Discharge: Anticipated in 2-4 Days             Tawny Alcantar MD  Hospitalist Service  [facility]  Securely message with [employer] (more info)  Text page via [employer] Paging/Directory   ______________________________________________________________________    Interval History     -Reviewed events and no fever and continues to be on 4 L of oxygen  -No fever, chills, cough or any chest discomfort  -Discussed results of the CT scan of the chest, respiratory panel-and pulmonary consult plan   -He has been updating his wife     Physical Exam   Vital Signs: Temp: 98  F (36.7  C) Temp src: Oral BP: 126/79 Pulse: 74  "  Resp: 18 SpO2: 96 % O2 Device: Nasal cannula Oxygen Delivery: 4 LPM  Weight: 291 lbs 9.6 oz        General: AOx3  Respiratory: ctla and no crackles and On 4L oxygen  Cardiovascular: Regular rate , S1 and S2 normal and still has edema over lower extremity  Abdomen:   soft   Neurologic: No focal deficit  Musculoskeletal: Normal Range of motion over upper and lower extremities bilaterally   Psychiatric: cooperative     Medical Decision Making       Time spent in care of patient is 50 minutes and I reviewed labs including basic metabolic panel, CBC, Pro-Manas, respiratory panel, CT scan of the chest and discussed plan of care in detail with the patient, nursing staff, cardiology and pulmonary team and case management and he had questions which were answered to      Data     I have personally reviewed the following data over the past 24 hrs:    N/A  \   N/A   / N/A     137 98 22.5 /  108 (H)   3.7 27 1.06 \       Imaging results reviewed over the past 24 hrs:   Recent Results (from the past 24 hour(s))   CT Chest w/o Contrast    Narrative    EXAM: CT CHEST W/O CONTRAST  LOCATION: [facility]  DATE: 5/8/2024    INDICATION: Ongoing shortness of breath with x ray mentioning about left retrocardiac opacity  COMPARISON: Chest x-ray 05/05/2024 and CT chest exam 3/11/2024  TECHNIQUE: CT chest without IV contrast. Multiplanar reformats were obtained. Dose reduction techniques were used.  CONTRAST: None.    FINDINGS:   LUNGS AND PLEURA: Extensive interstitial and groundglass opacities throughout both upper and lower lobes. No pleural effusions. No pneumothorax.    MEDIASTINUM/AXILLAE: Numerous enlarged mediastinal lymph nodes. No thoracic aortic aneurysm.    CORONARY ARTERY CALCIFICATION: Moderate.    UPPER ABDOMEN: Atherosclerotic disease abdominal aorta.    MUSCULOSKELETAL: Extensive hypertrophic changes thoracic spine.      Impression    IMPRESSION:   1.  Extensive interstitial and groundglass opacities  have developed in both lungs compatible with pneumonia. Covid 19 possible.  2.  Enlarged mediastinal nodes are likely reactive. Recommend follow-up CT chest exam after therapy to confirm resolution.

## 2024-05-09 NOTE — PROVIDER NOTIFICATION
MD Notification    Notified Person: MD    Notified Person Name: KIMBERLY Wheeler    Notification Date/Time: 5/8/24 2126    Notification Interaction: Vocera    Purpose of Notification: FYI CT chest result in. Any interventions? Thanks!    Orders Received:    Comments: ordered labs

## 2024-05-09 NOTE — PROGRESS NOTES
Lexiscan Stress: Pt tolerated well. VSS. Pt denied chest pain or pressure prior to injection. After injection felt a little lightheaded.. Sx diminished after 3min    Pt transferred back to Rm [id number] per w/c. Detailed report called to Kimi ARMANDO RN.

## 2024-05-10 LAB
ANION GAP SERPL CALCULATED.3IONS-SCNC: 13 MMOL/L (ref 7–15)
BUN SERPL-MCNC: 38.8 MG/DL (ref 8–23)
CALCIUM SERPL-MCNC: 9.6 MG/DL (ref 8.8–10.2)
CHLORIDE SERPL-SCNC: 95 MMOL/L (ref 98–107)
CREAT SERPL-MCNC: 1.22 MG/DL (ref 0.67–1.17)
CRP SERPL-MCNC: 70.91 MG/L
DEPRECATED HCO3 PLAS-SCNC: 28 MMOL/L (ref 22–29)
EGFRCR SERPLBLD CKD-EPI 2021: 63 ML/MIN/1.73M2
GLUCOSE SERPL-MCNC: 143 MG/DL (ref 70–99)
POTASSIUM SERPL-SCNC: 3.7 MMOL/L (ref 3.4–5.3)
SODIUM SERPL-SCNC: 136 MMOL/L (ref 135–145)

## 2024-05-10 PROCEDURE — 250N000011 HC RX IP 250 OP 636: Performed by: NURSE PRACTITIONER

## 2024-05-10 PROCEDURE — 36415 COLL VENOUS BLD VENIPUNCTURE: CPT | Performed by: INTERNAL MEDICINE

## 2024-05-10 PROCEDURE — 99231 SBSQ HOSP IP/OBS SF/LOW 25: CPT | Performed by: STUDENT IN AN ORGANIZED HEALTH CARE EDUCATION/TRAINING PROGRAM

## 2024-05-10 PROCEDURE — 80048 BASIC METABOLIC PNL TOTAL CA: CPT | Performed by: INTERNAL MEDICINE

## 2024-05-10 PROCEDURE — 99233 SBSQ HOSP IP/OBS HIGH 50: CPT | Performed by: HOSPITALIST

## 2024-05-10 PROCEDURE — 120N000001 HC R&B MED SURG/OB

## 2024-05-10 PROCEDURE — 250N000012 HC RX MED GY IP 250 OP 636 PS 637: Performed by: STUDENT IN AN ORGANIZED HEALTH CARE EDUCATION/TRAINING PROGRAM

## 2024-05-10 PROCEDURE — 250N000013 HC RX MED GY IP 250 OP 250 PS 637: Performed by: STUDENT IN AN ORGANIZED HEALTH CARE EDUCATION/TRAINING PROGRAM

## 2024-05-10 PROCEDURE — 86140 C-REACTIVE PROTEIN: CPT | Performed by: HOSPITALIST

## 2024-05-10 PROCEDURE — 250N000013 HC RX MED GY IP 250 OP 250 PS 637: Performed by: INTERNAL MEDICINE

## 2024-05-10 RX ADMIN — PANTOPRAZOLE SODIUM 40 MG: 40 TABLET, DELAYED RELEASE ORAL at 08:46

## 2024-05-10 RX ADMIN — LOSARTAN POTASSIUM 50 MG: 25 TABLET, FILM COATED ORAL at 08:46

## 2024-05-10 RX ADMIN — PREDNISONE 60 MG: 20 TABLET ORAL at 08:46

## 2024-05-10 RX ADMIN — POTASSIUM CHLORIDE 40 MEQ: 1500 TABLET, EXTENDED RELEASE ORAL at 18:05

## 2024-05-10 RX ADMIN — RIVAROXABAN 20 MG: 20 TABLET, FILM COATED ORAL at 18:06

## 2024-05-10 RX ADMIN — FUROSEMIDE 60 MG: 10 INJECTION, SOLUTION INTRAMUSCULAR; INTRAVENOUS at 08:47

## 2024-05-10 RX ADMIN — METOPROLOL TARTRATE 25 MG: 25 TABLET, FILM COATED ORAL at 20:19

## 2024-05-10 RX ADMIN — POTASSIUM CHLORIDE 40 MEQ: 1500 TABLET, EXTENDED RELEASE ORAL at 08:46

## 2024-05-10 RX ADMIN — SULFAMETHOXAZOLE AND TRIMETHOPRIM 1 TABLET: 800; 160 TABLET ORAL at 11:37

## 2024-05-10 RX ADMIN — ALLOPURINOL 300 MG: 300 TABLET ORAL at 08:46

## 2024-05-10 RX ADMIN — FINASTERIDE 1 MG: 1 TABLET, FILM COATED ORAL at 08:46

## 2024-05-10 RX ADMIN — METOPROLOL TARTRATE 25 MG: 25 TABLET, FILM COATED ORAL at 08:46

## 2024-05-10 RX ADMIN — METOLAZONE 2.5 MG: 2.5 TABLET ORAL at 08:46

## 2024-05-10 ASSESSMENT — ACTIVITIES OF DAILY LIVING (ADL)
ADLS_ACUITY_SCORE: 22
ADLS_ACUITY_SCORE: 20
ADLS_ACUITY_SCORE: 20
ADLS_ACUITY_SCORE: 22
ADLS_ACUITY_SCORE: 22
ADLS_ACUITY_SCORE: 20
ADLS_ACUITY_SCORE: 22
ADLS_ACUITY_SCORE: 20
ADLS_ACUITY_SCORE: 22
ADLS_ACUITY_SCORE: 20
ADLS_ACUITY_SCORE: 22
ADLS_ACUITY_SCORE: 20
ADLS_ACUITY_SCORE: 22
ADLS_ACUITY_SCORE: 20

## 2024-05-10 NOTE — PLAN OF CARE
Goal Outcome Evaluation:    Shift Note: 0700-1930    Orientation: A/Ox4  Activity: Independent   Diet/BS Checks: Cardiac diet, 1,800 mL FR  Tele:   IV Access/Drains: L PIV removed at end of shift d/t leaking at the site   Pain Management: Declined pain/nausea   Abnormal VS/Results: VSS except for soft BP's, on 4L O2 NC   Bowel/Bladder: Continent of B/B  Skin/Wounds: Scattered bruising, BLE karine and edematous  Consults: Pulmonology, cardiology, PT/OT, nutrition following    D/C Disposition: Pending pt progress     Other Info: Pt completed EKG and part 2 of Lexiscan today- see results. Pt started on Prednisone daily and will begin Bactrim tomorrow.

## 2024-05-10 NOTE — PROGRESS NOTES
[facility]    Medicine Progress Note - Hospitalist Service    Date of Admission:  5/5/2024    Assessment & Plan     This is a 73-year-old male with medical history which includes obesity, obstructive sleep apnea on CPAP,atrial fibrillation refractory to medical therapy (Flecainide) s/p PVI in 2017 with re-isolation of reconnected PVs in 2019, recurrent AF s/p DCCV and amiodarone, HFpEF and is s/p recent redo ablation on 5/2 who presented to the ED  on 5/5/24 with shortness of breath and was diagnosed with acute hypoxic respiratory failure     Acute hypoxic respiratory failure likely due to lung pathology  Bilateral groundglass opacities  Acute on chronic heart failure with preserved ejection fraction  Moderate aortic stenosis  H/ of amiodarone pneumonitis    -Last echo in 2/2024 showed EF of 55 to 60%  -Patient has been admitted in November 2023 in February 2023 for a dyspnea and workup in November including CT scan of the chest showed widespread pneumonitis 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 and it was stopped and patient was started on 60 mg prednisone and was on long taper which she finished at the end of March 2024  -Outpatient PFTs in 12/23 showed mild restriction  -Patient again was admitted in February 2023 for dyspnea along with anasarca and was diuresed during that hospital stay  -- CT chest on 3/11/24 showed improvement in bilateral interstitial pulmonary infiltrates with likely component of fibrosis   -Last pulmonary visit on 3/11/24 indicated to continue the steroid taper by end of March and be compliant with    -5/2/24- admission with shortness of breath and workup on admission was consistent with elevated proBNP of 1202, EKG no ST elevation and did increase the dose of diuretics at home but failed as outpatient    -Chest x-ray - 5/5/2024 showed diffuse indistinctness of the pulmonary interstitium which can be seen with air inflammation or edema and   concern for patchy opacity involving the retrocardiac region of the lung base and lateral aspect of right middle lobe concerning for infection  -Echo 5/6/2024-EF of 55%, mild LVH, moderate aortic stenosis, no pericardial effusion, grade 3 or advanced diastolic dysfunction and not able to assess regional wall motion changes  -Chest x-ray 5/8-similar interstitial opacities and mild basilar haziness favored to represent pulmonary edema   -CT Chest 5/8-extensive interstitial and groundglass and enlarged mediastinal nodes which are likely reactive  -5/9-Respiratory panel and COVID-19 negative  -WBC and Pro-Manas normal without any evidence of fever during this hospital stay  -CRP-100.6 and ESR at 54 on 5/9  -Pulmonary consulted on 5/9 and they do think that patient has underlying inflammatory disease and he was started on steroid with 60 mg daily along with Bactrim for PCP prophylaxis  -On exam he continues to need 4 L of oxygen and renal function did go up to 1.22  -ESR is also elevated at 54  -Net -8.7 L since admission  -Weight down to 282  --Given increase in renal function will hold diuretic for now and formal cardiology evaluation for the day pending and of note patient did receive the a.m. dose of 60 IV Lasix  -Even though stress test is mildly abnormal that does not explain his shortness of breath and per cardiology it is most likely lung inflammation causing his presentation  -Continue with prednisone 60 mg daily along with Bactrim over the weekend and see how his responses discussed with pulmonary team in person    Recurrent atrial fibrillation s/p DCCV and amiodarone with recent redo ablation on 5/2/24 with Dr. Day   -Continue with Xarelto and metoprolol 25 twice daily  -Repeat echo as above  -Continue to monitor on telemetry     Mildly elevated troponin, suspect demand/type II MI  Abnormal stress test  -Initial troponin was 90 and repeat was 85  -No evidence of any chest discomfort  -EKG no ischemic  "change  -Echo reviewed and detailed report report as above  - Nuclear stress is abnormal with small area of ischemia and per cardiology could be false positive due to body habitus  -Patient will eventually need right heart cath and left heart cath until patient's is back to baseline from respiratory standpoint  -Continue to monitor on telemetry     History of prolonged QT interval  -He does have QT interval which runs on the higher side and EKG on admission was reviewed    Obstructive sleep apnea  -CPAP as per home regimen and encourage patient to be compliant     Gout:  -Continue PTA  allopurinol    Family communication  - I did speak with juan at 407 pm and she was updated and questions answered    Discharge planning//barriers to discharge  -Patient is still needing a lot of oxygen and is being diuresed with IV medications and anticipate that he will be in the hospital for the next few days            Diet: Combination Diet 2 gm NA Diet; No Caffeine Diet (and additional linked orders)  Fluid restriction 1800 ML FLUID (and additional linked orders)    DVT Prophylaxis: DOAC  Palmer Catheter: Not present  Lines: None     Cardiac Monitoring: ACTIVE order. Indication: Acute decompensated heart failure (48 hours)  Code Status: Full Code      Clinically Significant Risk Factors                    # Hypertension: Noted on problem list  # Acute heart failure with preserved ejection fraction: heart failure noted on problem list, last echo with EF >50%, and receiving IV diuretics       # Severe Obesity: Estimated body mass index is 42.97 kg/m  as calculated from the following:    Height as of this encounter: 1.727 m (5' 8\").    Weight as of this encounter: 128.2 kg (282 lb 9.6 oz).        # Financial/Environmental Concerns: none         Disposition Plan     Medically Ready for Discharge: Anticipated in 2-4 Days             Tawny Alcantar MD  Hospitalist Service  [facility]  Securely message with " [employer] (more info)  Text page via [facility] Paging/Directory       This note was completed in part using dictation via the Dragon voice recognition software. Some word and grammatical errors may occur and must be interpreted in the appropriate clinical context. If there are any questions pertaining to this issue, please contact me for further clarification.      _____________________________________________________________________    Interval History     -Reviewed events and no fever and continues to be on 4 L of oxygen  -Did lose a lot of weight  -No fever, chills, cough or any chest discomfort  -Discussed results of the renal function and plan to hold diuretic for the day further doses based on renal function in a.m.  -He has been updating his wife     Physical Exam   Vital Signs: Temp: 97.8  F (36.6  C) Temp src: Oral BP: 126/73 Pulse: 72   Resp: 18 SpO2: 94 % O2 Device: Nasal cannula Oxygen Delivery: 4 LPM  Weight: 282 lbs 9.6 oz        General: AOx3  Respiratory: ctla and no crackles and On 4L oxygen  Cardiovascular: Regular rate , S1 and S2 normal and still has edema over lower extremity  Abdomen:   soft   Neurologic: No focal deficit  Musculoskeletal: Normal Range of motion over upper and lower extremities bilaterally   Psychiatric: cooperative     Medical Decision Making       Time spent in care of patient is 43 minutes and I reviewed labs and discussed with patient, nursing staff, wife       Data     I have personally reviewed the following data over the past 24 hrs:    N/A  \   N/A   / N/A     136 95 (L) 38.8 (H) /  143 (H)   3.7 28 1.22 (H) \     Procal: N/A CRP: 70.91 (H) Lactic Acid: N/A         Imaging results reviewed over the past 24 hrs:   Recent Results (from the past 24 hour(s))   NM Lexiscan stress test (nuc card)   Result Value    Target     Baseline Systolic     Baseline Diastolic BP 71    Last Stress Systolic     Last Stress Diastolic BP 64    Baseline HR 76    Max HR  77    Max  Predicted HR  52    Rate Pressure Product 8,162.0    BP 56    Narrative      The nuclear stress test is abnormal.    There is a small area of mild ischemia in the apical segment(s) of the   left ventricle.    There is a small area of nontransmural infarction in the basal   inferolateral segment(s) of the left ventricle associated with a mild   degree of iva-infarct ischemia involving basal to mid inferior wall.    Left ventricular function is normal.    The left ventricular ejection fraction at rest is 56%.

## 2024-05-10 NOTE — PLAN OF CARE
Goal Outcome Evaluation:    5/9/24  [id number]     Orientation: A/Ox4, pleasant  Activity: Ind in the room  Diet/BS Checks: Cardiac diet, FR 1800ml  Tele:  NSR  IV Access/Drains: R PIV SL  Pain Management: denies  Abnormal VS/Results: VSS,o2 NC at 4LPM  Bowel/Bladder: cont both, uses urinal at bedside with adequate UO, no bm this shift  Skin/Wounds: scattered bruising, BLE edema +1/+2   Consults: Cardio/Pulmo  D/C Disposition: pending improvement  Other Info: New PIV inserted this shift, able to well in between cares.

## 2024-05-10 NOTE — PLAN OF CARE
Goal Outcome Evaluation:Acute hypoxic respiratory failure likely due to lung pathology  Bilateral groundglass opacities  Acute on chronic heart failure with preserved ejection fraction  Moderate aortic stenosis  H/ of amiodarone pneumonitis       Orientation: a/o x4  calm/pleasant, denies general weakness, abdominal discomforts     Vitals: vss on 4 liter of  oxygen with 91% at base line     IV Access/drains: PIV SL     Diet: Regular with good appetite     Mobility: Independent     GI/: Continent I&O for 1800 fluid restrictions     Wound/Skin: dry skin, old bruises/scattered     Consults: Cardiology     Discharge Plan: pending       See Flow sheets for assessment

## 2024-05-11 ENCOUNTER — APPOINTMENT (OUTPATIENT)
Dept: OCCUPATIONAL THERAPY | Facility: CLINIC | Age: 74
DRG: 273 | End: 2024-05-11
Payer: COMMERCIAL

## 2024-05-11 LAB
ANION GAP SERPL CALCULATED.3IONS-SCNC: 11 MMOL/L (ref 7–15)
BUN SERPL-MCNC: 40.3 MG/DL (ref 8–23)
CALCIUM SERPL-MCNC: 9.4 MG/DL (ref 8.8–10.2)
CHLORIDE SERPL-SCNC: 98 MMOL/L (ref 98–107)
CREAT SERPL-MCNC: 1.11 MG/DL (ref 0.67–1.17)
CRP SERPL-MCNC: 41 MG/L
DEPRECATED HCO3 PLAS-SCNC: 29 MMOL/L (ref 22–29)
EGFRCR SERPLBLD CKD-EPI 2021: 71 ML/MIN/1.73M2
ERYTHROCYTE [DISTWIDTH] IN BLOOD BY AUTOMATED COUNT: 13.7 % (ref 10–15)
GLUCOSE SERPL-MCNC: 104 MG/DL (ref 70–99)
HCT VFR BLD AUTO: 39.9 % (ref 40–53)
HGB BLD-MCNC: 13.2 G/DL (ref 13.3–17.7)
MCH RBC QN AUTO: 32.5 PG (ref 26.5–33)
MCHC RBC AUTO-ENTMCNC: 33.1 G/DL (ref 31.5–36.5)
MCV RBC AUTO: 98 FL (ref 78–100)
PLATELET # BLD AUTO: 358 10E3/UL (ref 150–450)
POTASSIUM SERPL-SCNC: 3.5 MMOL/L (ref 3.4–5.3)
RBC # BLD AUTO: 4.06 10E6/UL (ref 4.4–5.9)
SODIUM SERPL-SCNC: 138 MMOL/L (ref 135–145)
WBC # BLD AUTO: 15.2 10E3/UL (ref 4–11)

## 2024-05-11 PROCEDURE — 250N000013 HC RX MED GY IP 250 OP 250 PS 637: Performed by: STUDENT IN AN ORGANIZED HEALTH CARE EDUCATION/TRAINING PROGRAM

## 2024-05-11 PROCEDURE — 250N000012 HC RX MED GY IP 250 OP 636 PS 637: Performed by: STUDENT IN AN ORGANIZED HEALTH CARE EDUCATION/TRAINING PROGRAM

## 2024-05-11 PROCEDURE — 250N000013 HC RX MED GY IP 250 OP 250 PS 637: Performed by: INTERNAL MEDICINE

## 2024-05-11 PROCEDURE — 120N000001 HC R&B MED SURG/OB

## 2024-05-11 PROCEDURE — 99232 SBSQ HOSP IP/OBS MODERATE 35: CPT | Performed by: HOSPITALIST

## 2024-05-11 PROCEDURE — 80048 BASIC METABOLIC PNL TOTAL CA: CPT | Performed by: HOSPITALIST

## 2024-05-11 PROCEDURE — 36415 COLL VENOUS BLD VENIPUNCTURE: CPT | Performed by: HOSPITALIST

## 2024-05-11 PROCEDURE — 86140 C-REACTIVE PROTEIN: CPT | Performed by: HOSPITALIST

## 2024-05-11 PROCEDURE — 85018 HEMOGLOBIN: CPT | Performed by: HOSPITALIST

## 2024-05-11 PROCEDURE — 250N000011 HC RX IP 250 OP 636: Performed by: HOSPITALIST

## 2024-05-11 PROCEDURE — 97110 THERAPEUTIC EXERCISES: CPT | Mod: GO

## 2024-05-11 RX ORDER — FUROSEMIDE 10 MG/ML
60 INJECTION INTRAMUSCULAR; INTRAVENOUS DAILY
Status: DISCONTINUED | OUTPATIENT
Start: 2024-05-11 | End: 2024-05-13

## 2024-05-11 RX ADMIN — PREDNISONE 60 MG: 20 TABLET ORAL at 08:50

## 2024-05-11 RX ADMIN — METOLAZONE 2.5 MG: 2.5 TABLET ORAL at 08:49

## 2024-05-11 RX ADMIN — POTASSIUM CHLORIDE 40 MEQ: 1500 TABLET, EXTENDED RELEASE ORAL at 08:49

## 2024-05-11 RX ADMIN — LOSARTAN POTASSIUM 50 MG: 25 TABLET, FILM COATED ORAL at 08:51

## 2024-05-11 RX ADMIN — FINASTERIDE 1 MG: 1 TABLET, FILM COATED ORAL at 08:49

## 2024-05-11 RX ADMIN — ALLOPURINOL 300 MG: 300 TABLET ORAL at 08:49

## 2024-05-11 RX ADMIN — SULFAMETHOXAZOLE AND TRIMETHOPRIM 1 TABLET: 800; 160 TABLET ORAL at 08:49

## 2024-05-11 RX ADMIN — FUROSEMIDE 60 MG: 10 INJECTION, SOLUTION INTRAMUSCULAR; INTRAVENOUS at 10:55

## 2024-05-11 RX ADMIN — PANTOPRAZOLE SODIUM 40 MG: 40 TABLET, DELAYED RELEASE ORAL at 08:49

## 2024-05-11 RX ADMIN — METOPROLOL TARTRATE 25 MG: 25 TABLET, FILM COATED ORAL at 20:44

## 2024-05-11 RX ADMIN — POTASSIUM CHLORIDE 40 MEQ: 1500 TABLET, EXTENDED RELEASE ORAL at 17:55

## 2024-05-11 RX ADMIN — RIVAROXABAN 20 MG: 20 TABLET, FILM COATED ORAL at 17:55

## 2024-05-11 RX ADMIN — METOPROLOL TARTRATE 25 MG: 25 TABLET, FILM COATED ORAL at 08:49

## 2024-05-11 ASSESSMENT — ACTIVITIES OF DAILY LIVING (ADL)
ADLS_ACUITY_SCORE: 22
ADLS_ACUITY_SCORE: 22
ADLS_ACUITY_SCORE: 23
ADLS_ACUITY_SCORE: 22
ADLS_ACUITY_SCORE: 23
ADLS_ACUITY_SCORE: 22
ADLS_ACUITY_SCORE: 23

## 2024-05-11 NOTE — PROGRESS NOTES
[facility]    Medicine Progress Note - Hospitalist Service    Date of Admission:  5/5/2024    Assessment & Plan     This is a 73-year-old male with medical history which includes obesity, obstructive sleep apnea on CPAP,atrial fibrillation refractory to medical therapy (Flecainide) s/p PVI in 2017 with re-isolation of reconnected PVs in 2019, recurrent AF s/p DCCV and amiodarone, HFpEF and is s/p recent redo ablation on 5/2 who presented to the ED  on 5/5/24 with shortness of breath and was diagnosed with acute hypoxic respiratory failure     Acute hypoxic respiratory failure likely due to lung pathology  Bilateral groundglass opacities  Acute on chronic heart failure with preserved ejection fraction  Moderate aortic stenosis  H/ of amiodarone pneumonitis    -Last echo in 2/2024 showed EF of 55 to 60%  -Patient has been admitted in November 2023 in February 2023 for a dyspnea and workup in November including CT scan of the chest showed widespread pneumonitis 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 and it was stopped and patient was started on 60 mg prednisone and was on long taper which she finished at the end of March 2024  -Outpatient PFTs in 12/23 showed mild restriction  -Patient again was admitted in February 2023 for dyspnea along with anasarca and was diuresed during that hospital stay  -- CT chest on 3/11/24 showed improvement in bilateral interstitial pulmonary infiltrates with likely component of fibrosis   -Last pulmonary visit on 3/11/24 indicated to continue the steroid taper by end of March and be compliant with    -5/2/24- admission with shortness of breath and workup on admission was consistent with elevated proBNP of 1202, EKG no ST elevation and did increase the dose of diuretics at home but failed as outpatient    -Chest x-ray - 5/5/2024 showed diffuse indistinctness of the pulmonary interstitium which can be seen with air inflammation or edema and   concern for patchy opacity involving the retrocardiac region of the lung base and lateral aspect of right middle lobe concerning for infection  -Echo 5/6/2024-EF of 55%, mild LVH, moderate aortic stenosis, no pericardial effusion, grade 3 or advanced diastolic dysfunction and not able to assess regional wall motion changes  -Chest x-ray 5/8-similar interstitial opacities and mild basilar haziness favored to represent pulmonary edema   -CT Chest 5/8-extensive interstitial and groundglass and enlarged mediastinal nodes which are likely reactive  -5/9-Respiratory panel and COVID-19 negative  -WBC and Pro-Manas normal without any evidence of fever during this hospital stay  -CRP-100.6 and ESR at 54 on 5/9  -Even though stress test is mildly abnormal that does not explain his shortness of breath and per cardiology it is most likely lung inflammation causing his presentation  -Pulmonary consulted on 5/9 and they do think that patient has underlying inflammatory lung process and started on steroids on 5/9  --Given increase in renal function he received 60 IV Lasix on 5/10  -Net -11 L since admission  -Weight is 283  -Creatinine-0.93-1.22-1.11  -CRP trending down  -on exam his breathing seems to be better and he is currently on 2 L of oxygen  -Continue with prednisone 60 mg daily along with Bactrim over the weekend and see how his responses discussed with pulmonary team in person    Recurrent atrial fibrillation s/p DCCV and amiodarone with recent redo ablation on 5/2/24 with Dr. Day   -Continue with Xarelto and metoprolol 25 twice daily  -Repeat echo as above  -Continue to monitor on telemetry     Mildly elevated troponin  Abnormal stress test  -Initial troponin was 90 and repeat was 85  -No evidence of any chest discomfort  -EKG no ischemic change  -Echo reviewed and detailed report report as above  - Nuclear stress is abnormal with small area of ischemia and per cardiology could be false positive due to body  "habitus  -Patient will eventually need right heart cath and left heart cath until patient's is back to baseline from respiratory standpoint  -Continue to monitor on telemetry    Mild leukocytosis  -He does have mildly increased WBC count and this is likely due to ongoing steroid use and no evidence of any fever and continue to monitor     History of prolonged QT interval  -He does have QT interval which runs on the higher side and EKG on admission was reviewed    Obstructive sleep apnea  -CPAP as per home regimen and encourage patient to be compliant     Gout:  -Continue PTA  allopurinol    Family communication  - I did speak with juan on 5/9 and 5/10 and will update intermittently and patient is alert and awake and has been updating his wife    Discharge planning//barriers to discharge  -Patient is still needing a lot of oxygen and is being diuresed with IV medications and anticipate that he will be in the hospital for the next few days            Diet: Combination Diet 2 gm NA Diet; No Caffeine Diet (and additional linked orders)  Fluid restriction 1800 ML FLUID (and additional linked orders)    DVT Prophylaxis: DOAC  Palmer Catheter: Not present  Lines: None     Cardiac Monitoring: ACTIVE order. Indication: Acute decompensated heart failure (48 hours)  Code Status: Full Code      Clinically Significant Risk Factors                    # Hypertension: Noted on problem list  # Acute heart failure with preserved ejection fraction: heart failure noted on problem list, last echo with EF >50%, and receiving IV diuretics       # Severe Obesity: Estimated body mass index is 43.11 kg/m  as calculated from the following:    Height as of this encounter: 1.727 m (5' 8\").    Weight as of this encounter: 128.6 kg (283 lb 8 oz).        # Financial/Environmental Concerns: none         Disposition Plan     Medically Ready for Discharge: Anticipated in 2-4 Days             Tawny Alcantar MD  Hospitalist Service  [facility] " [facility]  Securely message with [employer] (more info)  Text page via [employer] Paging/Directory       This note was completed in part using dictation via the Dragon voice recognition software. Some word and grammatical errors may occur and must be interpreted in the appropriate clinical context. If there are any questions pertaining to this issue, please contact me for further clarification.      _____________________________________________________________________    Interval History     -Reviewed overnight events and his oxygenation has improved to 2 L at rest but still gets very short of breath on exertion  -No nausea or vomiting  -Discussed the results of the labs with the patient    Physical Exam   Vital Signs: Temp: 97.5  F (36.4  C) Temp src: Oral BP: 136/69 Pulse: 59   Resp: 16 SpO2: 93 % O2 Device: Nasal cannula Oxygen Delivery: 2 LPM  Weight: 283 lbs 8 oz        General: AOx3  Respiratory: ctla and no crackles and On 2L oxygen  Cardiovascular: Regular rate , S1 and S2 normal and still has edema over lower extremity  Abdomen:   soft   Neurologic: No focal deficit  Musculoskeletal: Normal Range of motion over upper and lower extremities bilaterally   Psychiatric: cooperative     Medical Decision Making       Time spent in care of patient is 42 minutes and I reviewed labs including basic metabolic panel, CBC, and discussed plan of care with pulmonary, wife and nursing staff     Data     I have personally reviewed the following data over the past 24 hrs:    15.2 (H)  \   13.2 (L)   / 358     138 98 40.3 (H) /  104 (H)   3.5 29 1.11 \     Procal: N/A CRP: 41.00 (H) Lactic Acid: N/A         Imaging results reviewed over the past 24 hrs:   No results found for this or any previous visit (from the past 24 hour(s)).

## 2024-05-11 NOTE — PLAN OF CARE
Goal Outcome Evaluation:       .DATE & TIME: 5/11/2024   Cognitive Concerns/ Orientation : A&O  BEHAVIOR & AGGRESSION TOOL COLOR: green   ABNL VS/O2: 3L NC  MOBILITY: up independently in the room  PAIN MANAGMENT: Denies SOB or any discomfort  DIET: Regular diet  BOWEL/BLADDER: use urinal  DRAIN/DEVICES: NA  TELEMETRY RHYTHM: SR  SKIN: intact with several bruising   TESTS/PROCEDURES: Creatinine  D/C DATE: TBD  Discharge Barriers: Pending  OTHER IMPORTANT INFO:  A&O in the room denies discomfort, tolerated SOB  uses 2-3L NC tolerated plan to discharge pending continue to monitor creatinine

## 2024-05-11 NOTE — PROGRESS NOTES
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     Acute Hypoxic Respiratory Failure  Bilateral Ground Glass Opacities  Hx Amiodarone Pneumonitis  BOWEN on CPAP  Recent ablation for Afib with continued dyspnea and ultimately oxygen requirement. 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 At that time infectious including fungal workup negative. Limited initial response to 40 mg daily of prednisone but improvement on 60 mg and underwent several months long taper with greatly improved imaging March 2024. Currently viral testing negative, normal WBC, procal negative, no significant cough or fever and believe infection less likely. Has been diuresed effectively without any impact on symptoms. CRP and ESR both elevated. No imaging since March so unknown timeline for development of infiltrates. Believe relapse of inflammatory process is most likely, have started prednisone at 60 mg daily. Will continue this dose over the weekend, assess symptoms and likely imaging early next week to determine if improvement on immunosuppression. If lungs responsive to anti-inflammatory therapy and forse another long steroid course, could discuss steroid sparing therapy.  - Prednisone 60 mg daily  - Bactrim prophylaxis ordered in anticipation of prolonged therapy  - TPMT sent in case needing steroid alternative in future, pending    Lubna Mejias MD  PhD  Pulmonary and Critical Care     Interval History:    Sarted on prednisone yesterday, no acute events overnight. Feeling about the same today. Asked how long it took to feel better when went on steroids for amiodarone toxicity last year and thinks it took about one week.     Review of Systems:  Complete 12 point ROS negative unless mentioned in HPI    Medications, Allergies:    Medications:  Current Facility-Administered Medications   Medication Dose Route Frequency Provider Last Rate Last Admin    allopurinol (ZYLOPRIM) tablet 300 mg  300 mg Oral Daily Rico Lemus, DO   300 mg at 05/10/24 0846    finasteride (PROPECIA) tablet 1 mg  1 mg Oral Daily Rico Lemus, DO   1 mg at 05/10/24 0846    [Held by provider] furosemide (LASIX) injection 60 mg  60 mg Intravenous BID Marta Davila NP   60 mg at 05/10/24 0847    losartan (COZAAR) tablet 50 mg  50 mg Oral Daily Rico Lemus, DO   50 mg at 05/10/24 0846    metolazone (ZAROXOLYN) tablet 2.5 mg  2.5 mg Oral Daily Anastacio Morrissey MD   2.5 mg at 05/10/24 0846    metoprolol tartrate (LOPRESSOR) tablet 25 mg  25 mg Oral BID Rico Lemus, DO   25 mg at 05/10/24 0846    pantoprazole (PROTONIX) EC tablet 40 mg  40 mg Oral QAM AC Rico Lemus, DO   40 mg at 05/10/24 0846    potassium chloride pushpa ER (KLOR-CON M20) CR tablet 40 mEq  40 mEq Oral BID Rico Lemus, DO   40 mEq at 05/10/24 1805    predniSONE (DELTASONE) tablet 60 mg  60 mg Oral Daily Lubna Mejias MD   60 mg at 05/10/24 0846    rivaroxaban ANTICOAGULANT (XARELTO) tablet 20 mg  20 mg Oral Daily with supper Rico Lemus, DO   20 mg at 05/10/24 1806    sodium chloride (PF) 0.9% PF flush 3 mL  3 mL Intracatheter Q8H Rico Lemus, DO   3 mL at 05/10/24 1456    sulfamethoxazole-trimethoprim (BACTRIM DS) 800-160 MG per tablet 1 tablet  1 tablet Oral Daily Lubna Mejias MD   1 tablet at 05/10/24 0722     Current Facility-Administered Medications   Medication Dose  Route Frequency Provider Last Rate Last Admin    - MEDICATION INSTRUCTIONS -   Does not apply DOES NOT GO TO Rico Herring, DO        acetaminophen (TYLENOL) tablet 650 mg  650 mg Oral Q4H PRN Rico Lemus P, DO        Or    acetaminophen (TYLENOL) Suppository 650 mg  650 mg Rectal Q4H PRN Rico Lemus P, DO        Continuing ACE inhibitor/ARB/ARNI from home medication list OR ACE inhibitor/ARB/ARNI order already placed during this visit   Does not apply DOES NOT GO TO Rico Herring, DO        Continuing beta blocker from home medication list OR beta blocker order already placed during this visit   Does not apply DOES NOT GO TO Rico Herring, DO        lidocaine (LMX4) cream   Topical Q1H PRN Rico Lemus, DO        lidocaine 1 % 0.1-1 mL  0.1-1 mL Other Q1H PRN Rico Lemus P, DO        melatonin tablet 1 mg  1 mg Oral At Bedtime PRN Rico Lemus P, DO        ondansetron (ZOFRAN ODT) ODT tab 4 mg  4 mg Oral Q6H PRN Rico Lemus,         Or    ondansetron (ZOFRAN) injection 4 mg  4 mg Intravenous Q6H PRN Rico Lemus P, DO        Patient is already receiving anticoagulation with heparin, enoxaparin (LOVENOX), warfarin (COUMADIN)  or other anticoagulant medication   Does not apply Continuous PRN Rico Lemus P, DO        senna-docusate (SENOKOT-S/PERICOLACE) 8.6-50 MG per tablet 1 tablet  1 tablet Oral BID PRN Rico Lemus,         Or    senna-docusate (SENOKOT-S/PERICOLACE) 8.6-50 MG per tablet 2 tablet  2 tablet Oral BID PRN Rico Lemus P, DO        sodium chloride (PF) 0.9% PF flush 10 mL  10 mL Intravenous Q10 Min PRN Rocky Marinelli MD        sodium chloride (PF) 0.9% PF flush 3 mL  3 mL Intracatheter q1 min prn Rico Lemus P, DO        sodium chloride (PF) 0.9% PF flush 5-10 mL  5-10 mL Intravenous q1 min prn Rocky Marinelli MD         Allergies:     Allergies   Allergen Reactions    Amiodarone Shortness Of Breath     Suspected amiodarone toxicity involving lungs      Physical Exam:    Temp:  [97.7  F (36.5  C)-98.1  F (36.7  C)] 97.7  F (36.5  C)  Pulse:  [69-81] 72  Resp:  [18] 18  BP: (104-126)/(58-77) 104/58  SpO2:  [92 %-94 %] 92 %      Intake/Output Summary (Last 24 hours) at 5/10/2024 1925  Last data filed at 5/10/2024 1552  Gross per 24 hour   Intake 755 ml   Output 3580 ml   Net -2825 ml     General: Sitting up at side of bed in NAD  HEENT: anicteric, moist mucosa  Chest: CTAB, some crackles posteriorly, no wheezing  Cardiac: RRR no murmurs  Abdomen: Soft, flat, non tender, active BS  Extremities: No LE Edema  Neuro: A&Ox3, no focal deficits   Skin: no rash noted    Laboratory, imaging, and microbiologic data:    All laboratory and imaging data reviewed, pertinent results discussed above.

## 2024-05-11 NOTE — PLAN OF CARE
Orientation: A&O  Activity: Ind in the room  Diet/BS Checks: Diet 2 gm NA Diet; No Caffeine Diet  (2 Gram Sodium Diet - No Caffeine - 1800 mL fluid restriction)   Tele:  Sinus bradycardia  IV Access/Drains: PIV SL  Pain Management: Denies  Abnormal VS/Results: VSS on 2L NC, de-sats with activity. Elevated CRP, WBC  Bowel/Bladder: BM today, voids in urinal, good output after IV lasix dose  Skin/Wounds: Sam LE, LE edema, scattered bruising  Consults: Cardiology signed off.  Pulmonology.    D/C Disposition: Anticipated 2-4 days per hospitalist/respiratory status

## 2024-05-12 ENCOUNTER — APPOINTMENT (OUTPATIENT)
Dept: OCCUPATIONAL THERAPY | Facility: CLINIC | Age: 74
DRG: 273 | End: 2024-05-12
Payer: COMMERCIAL

## 2024-05-12 LAB
ANION GAP SERPL CALCULATED.3IONS-SCNC: 11 MMOL/L (ref 7–15)
BUN SERPL-MCNC: 38.2 MG/DL (ref 8–23)
CALCIUM SERPL-MCNC: 9.4 MG/DL (ref 8.8–10.2)
CHLORIDE SERPL-SCNC: 97 MMOL/L (ref 98–107)
CREAT SERPL-MCNC: 1.19 MG/DL (ref 0.67–1.17)
DEPRECATED HCO3 PLAS-SCNC: 32 MMOL/L (ref 22–29)
EGFRCR SERPLBLD CKD-EPI 2021: 65 ML/MIN/1.73M2
ERYTHROCYTE [DISTWIDTH] IN BLOOD BY AUTOMATED COUNT: 13.9 % (ref 10–15)
GLUCOSE SERPL-MCNC: 103 MG/DL (ref 70–99)
HCT VFR BLD AUTO: 43.2 % (ref 40–53)
HGB BLD-MCNC: 14.1 G/DL (ref 13.3–17.7)
MCH RBC QN AUTO: 32.8 PG (ref 26.5–33)
MCHC RBC AUTO-ENTMCNC: 32.6 G/DL (ref 31.5–36.5)
MCV RBC AUTO: 101 FL (ref 78–100)
PLATELET # BLD AUTO: 374 10E3/UL (ref 150–450)
POTASSIUM SERPL-SCNC: 3.5 MMOL/L (ref 3.4–5.3)
RBC # BLD AUTO: 4.3 10E6/UL (ref 4.4–5.9)
SODIUM SERPL-SCNC: 140 MMOL/L (ref 135–145)
WBC # BLD AUTO: 12.5 10E3/UL (ref 4–11)

## 2024-05-12 PROCEDURE — 250N000011 HC RX IP 250 OP 636: Performed by: HOSPITALIST

## 2024-05-12 PROCEDURE — 97535 SELF CARE MNGMENT TRAINING: CPT | Mod: GO

## 2024-05-12 PROCEDURE — 36415 COLL VENOUS BLD VENIPUNCTURE: CPT | Performed by: HOSPITALIST

## 2024-05-12 PROCEDURE — 85027 COMPLETE CBC AUTOMATED: CPT | Performed by: HOSPITALIST

## 2024-05-12 PROCEDURE — 250N000013 HC RX MED GY IP 250 OP 250 PS 637: Performed by: INTERNAL MEDICINE

## 2024-05-12 PROCEDURE — 250N000012 HC RX MED GY IP 250 OP 636 PS 637: Performed by: STUDENT IN AN ORGANIZED HEALTH CARE EDUCATION/TRAINING PROGRAM

## 2024-05-12 PROCEDURE — 120N000001 HC R&B MED SURG/OB

## 2024-05-12 PROCEDURE — 99232 SBSQ HOSP IP/OBS MODERATE 35: CPT | Performed by: HOSPITALIST

## 2024-05-12 PROCEDURE — 97110 THERAPEUTIC EXERCISES: CPT | Mod: GO

## 2024-05-12 PROCEDURE — 250N000013 HC RX MED GY IP 250 OP 250 PS 637: Performed by: STUDENT IN AN ORGANIZED HEALTH CARE EDUCATION/TRAINING PROGRAM

## 2024-05-12 PROCEDURE — 80048 BASIC METABOLIC PNL TOTAL CA: CPT | Performed by: HOSPITALIST

## 2024-05-12 RX ADMIN — POTASSIUM CHLORIDE 40 MEQ: 1500 TABLET, EXTENDED RELEASE ORAL at 10:18

## 2024-05-12 RX ADMIN — FINASTERIDE 1 MG: 1 TABLET, FILM COATED ORAL at 10:18

## 2024-05-12 RX ADMIN — POTASSIUM CHLORIDE 40 MEQ: 1500 TABLET, EXTENDED RELEASE ORAL at 18:21

## 2024-05-12 RX ADMIN — SULFAMETHOXAZOLE AND TRIMETHOPRIM 1 TABLET: 800; 160 TABLET ORAL at 10:18

## 2024-05-12 RX ADMIN — FUROSEMIDE 60 MG: 10 INJECTION, SOLUTION INTRAMUSCULAR; INTRAVENOUS at 10:17

## 2024-05-12 RX ADMIN — SENNOSIDES AND DOCUSATE SODIUM 2 TABLET: 50; 8.6 TABLET ORAL at 13:50

## 2024-05-12 RX ADMIN — PANTOPRAZOLE SODIUM 40 MG: 40 TABLET, DELAYED RELEASE ORAL at 10:18

## 2024-05-12 RX ADMIN — PREDNISONE 60 MG: 20 TABLET ORAL at 10:17

## 2024-05-12 RX ADMIN — ALLOPURINOL 300 MG: 300 TABLET ORAL at 10:18

## 2024-05-12 RX ADMIN — RIVAROXABAN 20 MG: 20 TABLET, FILM COATED ORAL at 18:21

## 2024-05-12 RX ADMIN — METOLAZONE 2.5 MG: 2.5 TABLET ORAL at 10:18

## 2024-05-12 RX ADMIN — LOSARTAN POTASSIUM 50 MG: 25 TABLET, FILM COATED ORAL at 10:18

## 2024-05-12 ASSESSMENT — ACTIVITIES OF DAILY LIVING (ADL)
ADLS_ACUITY_SCORE: 22

## 2024-05-12 NOTE — PLAN OF CARE
Goal Outcome Evaluation:    5/11/24  [id number]    Orientation: A/Ox4, pleasant  Activity: up ind in the room  Diet/BS Checks: 2gm NA, no caffeine, FR-1800ml   Tele:  NSR  IV Access/Drains: PIV SL  Pain Management: denies  Abnormal VS/Results: VSS,o2 2lpm NC  Bowel/Bladder: cont both, uses urinal at bedside  Skin/Wounds: karine, scattered bruising  Consults: Pulmo  D/C Disposition: pending o2 support and breathing improvement  Other Info: Ambulated in the hallway 1x this shift. Still de-sats with activity.  On K protocol. Rechecks in AM

## 2024-05-12 NOTE — PLAN OF CARE
5/11/2024 2300 - 5/12/2024 9930    Orientation: A&Ox4  Activity: Independent  Diet/BS Checks: 2 gm NA restricted, no caffeine diet (1800 mL/day fluid restriction)   Tele:  Sinus bradycardia  IV Access/Drains: PIV SL  Pain Management: Denies  Abnormal VS/Results: VSS on 2L NC, de-sats with activity. Elevated CRP, WBC  Bowel/Bladder: Continent of bowel/bladder; no reported BM this shift  Skin/Wounds: Sam LE, LE edema, scattered bruising  Consults: Cardiology signed off.  Pulmonology following  D/C Disposition: pending progress;  2-3 more days pending improvement of respiratory status

## 2024-05-12 NOTE — PROGRESS NOTES
[facility]    Medicine Progress Note - Hospitalist Service    Date of Admission:  5/5/2024    Assessment & Plan     This is a 73-year-old male with medical history which includes obesity, obstructive sleep apnea on CPAP,atrial fibrillation refractory to medical therapy (Flecainide) s/p PVI in 2017 with re-isolation of reconnected PVs in 2019, recurrent AF s/p DCCV and amiodarone, HFpEF and is s/p recent redo ablation on 5/2 who presented to the ED  on 5/5/24 with shortness of breath and was diagnosed with acute hypoxic respiratory failure     Acute hypoxic respiratory failure likely due to lung pathology  Bilateral groundglass opacities  Acute on chronic heart failure with preserved ejection fraction  Moderate aortic stenosis  H/ of amiodarone pneumonitis    -Last echo in 2/2024 showed EF of 55 to 60%  -Patient has been admitted in November 2023 in February 2023 for a dyspnea and workup in November including CT scan of the chest showed widespread pneumonitis 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 and it was stopped and patient was started on 60 mg prednisone and was on long taper which she finished at the end of March 2024  -Outpatient PFTs in 12/23 showed mild restriction  -Patient again was admitted in February 2023 for dyspnea along with anasarca and was diuresed during that hospital stay  -- CT chest on 3/11/24 showed improvement in bilateral interstitial pulmonary infiltrates with likely component of fibrosis   -Last pulmonary visit on 3/11/24 indicated to continue the steroid taper by end of March and be compliant with    -5/2/24- admission with shortness of breath and workup on admission was consistent with elevated proBNP of 1202, EKG no ST elevation and did increase the dose of diuretics at home but failed as outpatient    -Chest x-ray - 5/5/2024 showed diffuse indistinctness of the pulmonary interstitium which can be seen with air inflammation or edema and   concern for patchy opacity involving the retrocardiac region of the lung base and lateral aspect of right middle lobe concerning for infection  -Echo 5/6/2024-EF of 55%, mild LVH, moderate aortic stenosis, no pericardial effusion, grade 3 or advanced diastolic dysfunction and not able to assess regional wall motion changes  -Chest x-ray 5/8-similar interstitial opacities and mild basilar haziness favored to represent pulmonary edema   -CT Chest 5/8-extensive interstitial and groundglass and enlarged mediastinal nodes which are likely reactive  -5/9-Respiratory panel and COVID-19 negative  -WBC and Pro-Manas normal without any evidence of fever during this hospital stay  -CRP-100.6 and ESR at 54 on 5/9  -Even though stress test is mildly abnormal that does not explain his shortness of breath and per cardiology it is most likely lung inflammation causing his presentation  -Pulmonary consulted on 5/9 and they do think that patient has underlying inflammatory lung process and started on steroids on 5/9  -Net -13.7 L since admission  -Weight is 281  -Creatinine-0.93-1.22-1.11-1.19  -Bicarb is trending upward  -CRP trending down  -On exam no significant wheezing or crackles and lungs are clear and is on 2 L of oxygen without any accessory muscles of respiration  -Will hold ongoing use of Lasix and a.m. team to order based on renal function and bicarb in am   -Continue with prednisone 60 mg daily along with Bactrim over the weekend and see how his responses discussed with pulmonary team in person    Recurrent atrial fibrillation s/p DCCV and amiodarone with recent redo ablation on 5/2/24 with Dr. Day   -Continue with Xarelto and metoprolol 25 twice daily  -Repeat echo as above  -Continue to monitor on telemetry     Mildly elevated troponin  Abnormal stress test  -Initial troponin was 90 and repeat was 85  -No evidence of any chest discomfort  -EKG no ischemic change  -Echo reviewed and detailed report report as above  -  "Nuclear stress is abnormal with small area of ischemia and per cardiology could be false positive due to body habitus  -Patient will eventually need right heart cath and left heart cath until patient's is back to baseline from respiratory standpoint  -Continue to monitor on telemetry    Mild leukocytosis- improving  -He does have mildly increased WBC count and this is likely due to ongoing steroid use and no evidence of any fever and continue to monitor     History of prolonged QT interval  -He does have QT interval which runs on the higher side and EKG on admission was reviewed    Obstructive sleep apnea  -CPAP as per home regimen and encourage patient to be compliant     Gout:  -Continue PTA  allopurinol    Family communication  - I did speak with juan on 5/9 and 5/10 and will update intermittently and patient is alert and awake and has been updating his wife    Discharge planning//barriers to discharge  -Patient is still needing oxygen and pulmonary and cardiology following and will need to see how he is doing in the next 1 to 2 days            Diet: Combination Diet 2 gm NA Diet; No Caffeine Diet (and additional linked orders)  Fluid restriction 1800 ML FLUID (and additional linked orders)    DVT Prophylaxis: DOAC  Palmer Catheter: Not present  Lines: None     Cardiac Monitoring: ACTIVE order. Indication: Acute decompensated heart failure (48 hours)  Code Status: Full Code      Clinically Significant Risk Factors                    # Hypertension: Noted on problem list  # Acute heart failure with preserved ejection fraction: heart failure noted on problem list, last echo with EF >50%, and receiving IV diuretics       # Severe Obesity: Estimated body mass index is 42.85 kg/m  as calculated from the following:    Height as of this encounter: 1.727 m (5' 8\").    Weight as of this encounter: 127.8 kg (281 lb 12.8 oz).        # Financial/Environmental Concerns: none         Disposition Plan     Medically Ready for " Discharge: Anticipated in 2-4 Days             Tawny Alcantar MD  Hospitalist Service  [facility]  Securely message with [employer] (more info)  Text page via [employer] Paging/Directory       This note was completed in part using dictation via the Dragon voice recognition software. Some word and grammatical errors may occur and must be interpreted in the appropriate clinical context. If there are any questions pertaining to this issue, please contact me for further clarification.      I am off service from tomorrow morning and his care will be taken over by the hospital medicine to    _____________________________________________________________________    Interval History     -Continues to need 2 L of oxygen but thinks breathing is improved at rest but still dips down to 80s with exertion  -No chest discomfort  -Happy weight is getting down  -Discussed the results of the labs with the patient    Physical Exam   Vital Signs: Temp: 97.3  F (36.3  C) Temp src: Oral BP: 119/75 Pulse: 60   Resp: 18 SpO2: 92 % O2 Device: Nasal cannula Oxygen Delivery: 2 LPM  Weight: 281 lbs 12.8 oz        General: AOx3  Respiratory: ctla and no crackles and On 2L oxygen  Cardiovascular: Regular rate and mild edema over the legs and is significantly improved  Abdomen:   soft   Neurologic: No focal deficit  Musculoskeletal: Normal Range of motion over upper and lower extremities bilaterally   Psychiatric: cooperative     Medical Decision Making       Time spent in care of patient is 43 minutes and I reviewed labs including basic metabolic panel, CBC and discussed with the patient and nursing staff     Data     I have personally reviewed the following data over the past 24 hrs:      Imaging results reviewed over the past 24 hrs:   No results found for this or any previous visit (from the past 24 hour(s)).    I did review the basic metabolic panel and chloride is 97, bicarb of 32 and creatinine is 1.19

## 2024-05-13 ENCOUNTER — APPOINTMENT (OUTPATIENT)
Dept: OCCUPATIONAL THERAPY | Facility: CLINIC | Age: 74
DRG: 273 | End: 2024-05-13
Attending: INTERNAL MEDICINE
Payer: COMMERCIAL

## 2024-05-13 ENCOUNTER — APPOINTMENT (OUTPATIENT)
Dept: CT IMAGING | Facility: CLINIC | Age: 74
DRG: 273 | End: 2024-05-13
Attending: INTERNAL MEDICINE
Payer: COMMERCIAL

## 2024-05-13 LAB
ANION GAP SERPL CALCULATED.3IONS-SCNC: 13 MMOL/L (ref 7–15)
BUN SERPL-MCNC: 35 MG/DL (ref 8–23)
CALCIUM SERPL-MCNC: 9.5 MG/DL (ref 8.8–10.2)
CHLORIDE SERPL-SCNC: 97 MMOL/L (ref 98–107)
CK SERPL-CCNC: 39 U/L (ref 39–308)
CREAT SERPL-MCNC: 1.14 MG/DL (ref 0.67–1.17)
CRP SERPL-MCNC: 11.96 MG/L
CRP SERPL-MCNC: 13.8 MG/L
DEPRECATED HCO3 PLAS-SCNC: 27 MMOL/L (ref 22–29)
EGFRCR SERPLBLD CKD-EPI 2021: 68 ML/MIN/1.73M2
ERYTHROCYTE [DISTWIDTH] IN BLOOD BY AUTOMATED COUNT: 13.7 % (ref 10–15)
ERYTHROCYTE [SEDIMENTATION RATE] IN BLOOD BY WESTERGREN METHOD: 28 MM/HR (ref 0–20)
GLUCOSE SERPL-MCNC: 141 MG/DL (ref 70–99)
HCT VFR BLD AUTO: 43.9 % (ref 40–53)
HGB BLD-MCNC: 14.6 G/DL (ref 13.3–17.7)
MCH RBC QN AUTO: 32.7 PG (ref 26.5–33)
MCHC RBC AUTO-ENTMCNC: 33.3 G/DL (ref 31.5–36.5)
MCV RBC AUTO: 98 FL (ref 78–100)
PLATELET # BLD AUTO: 389 10E3/UL (ref 150–450)
POTASSIUM SERPL-SCNC: 3.2 MMOL/L (ref 3.4–5.3)
POTASSIUM SERPL-SCNC: 3.8 MMOL/L (ref 3.4–5.3)
RBC # BLD AUTO: 4.47 10E6/UL (ref 4.4–5.9)
SODIUM SERPL-SCNC: 137 MMOL/L (ref 135–145)
TPMT BLD-CCNC: 33.7 U/ML
WBC # BLD AUTO: 14.8 10E3/UL (ref 4–11)

## 2024-05-13 PROCEDURE — 250N000013 HC RX MED GY IP 250 OP 250 PS 637: Performed by: INTERNAL MEDICINE

## 2024-05-13 PROCEDURE — 120N000001 HC R&B MED SURG/OB

## 2024-05-13 PROCEDURE — 86140 C-REACTIVE PROTEIN: CPT | Performed by: INTERNAL MEDICINE

## 2024-05-13 PROCEDURE — 85652 RBC SED RATE AUTOMATED: CPT | Performed by: INTERNAL MEDICINE

## 2024-05-13 PROCEDURE — 84132 ASSAY OF SERUM POTASSIUM: CPT | Performed by: INTERNAL MEDICINE

## 2024-05-13 PROCEDURE — 36415 COLL VENOUS BLD VENIPUNCTURE: CPT | Performed by: HOSPITALIST

## 2024-05-13 PROCEDURE — 82550 ASSAY OF CK (CPK): CPT | Performed by: INTERNAL MEDICINE

## 2024-05-13 PROCEDURE — 86235 NUCLEAR ANTIGEN ANTIBODY: CPT | Performed by: INTERNAL MEDICINE

## 2024-05-13 PROCEDURE — 82085 ASSAY OF ALDOLASE: CPT | Performed by: INTERNAL MEDICINE

## 2024-05-13 PROCEDURE — 86606 ASPERGILLUS ANTIBODY: CPT | Performed by: INTERNAL MEDICINE

## 2024-05-13 PROCEDURE — 71250 CT THORAX DX C-: CPT | Mod: MG

## 2024-05-13 PROCEDURE — 86038 ANTINUCLEAR ANTIBODIES: CPT | Performed by: INTERNAL MEDICINE

## 2024-05-13 PROCEDURE — 85027 COMPLETE CBC AUTOMATED: CPT | Performed by: HOSPITALIST

## 2024-05-13 PROCEDURE — 99233 SBSQ HOSP IP/OBS HIGH 50: CPT | Performed by: INTERNAL MEDICINE

## 2024-05-13 PROCEDURE — 36415 COLL VENOUS BLD VENIPUNCTURE: CPT | Performed by: INTERNAL MEDICINE

## 2024-05-13 PROCEDURE — 250N000012 HC RX MED GY IP 250 OP 636 PS 637: Performed by: STUDENT IN AN ORGANIZED HEALTH CARE EDUCATION/TRAINING PROGRAM

## 2024-05-13 PROCEDURE — 86140 C-REACTIVE PROTEIN: CPT | Performed by: HOSPITALIST

## 2024-05-13 PROCEDURE — 82565 ASSAY OF CREATININE: CPT | Performed by: HOSPITALIST

## 2024-05-13 PROCEDURE — 86200 CCP ANTIBODY: CPT | Performed by: INTERNAL MEDICINE

## 2024-05-13 PROCEDURE — 86037 ANCA TITER EACH ANTIBODY: CPT | Performed by: INTERNAL MEDICINE

## 2024-05-13 PROCEDURE — 250N000012 HC RX MED GY IP 250 OP 636 PS 637: Performed by: INTERNAL MEDICINE

## 2024-05-13 PROCEDURE — 250N000011 HC RX IP 250 OP 636: Performed by: INTERNAL MEDICINE

## 2024-05-13 PROCEDURE — 97110 THERAPEUTIC EXERCISES: CPT | Mod: GO

## 2024-05-13 PROCEDURE — 250N000013 HC RX MED GY IP 250 OP 250 PS 637: Performed by: STUDENT IN AN ORGANIZED HEALTH CARE EDUCATION/TRAINING PROGRAM

## 2024-05-13 RX ORDER — PREDNISONE 5 MG/1
5 TABLET ORAL DAILY
Status: DISCONTINUED | OUTPATIENT
Start: 2024-08-10 | End: 2024-05-16 | Stop reason: HOSPADM

## 2024-05-13 RX ORDER — PREDNISONE 20 MG/1
40 TABLET ORAL DAILY
Status: DISCONTINUED | OUTPATIENT
Start: 2024-05-13 | End: 2024-05-16 | Stop reason: HOSPADM

## 2024-05-13 RX ORDER — FUROSEMIDE 10 MG/ML
60 INJECTION INTRAMUSCULAR; INTRAVENOUS DAILY
Status: DISCONTINUED | OUTPATIENT
Start: 2024-05-13 | End: 2024-05-16

## 2024-05-13 RX ORDER — PREDNISONE 10 MG/1
10 TABLET ORAL DAILY
Status: DISCONTINUED | OUTPATIENT
Start: 2024-06-26 | End: 2024-05-16 | Stop reason: HOSPADM

## 2024-05-13 RX ORDER — POTASSIUM CHLORIDE 1500 MG/1
40 TABLET, EXTENDED RELEASE ORAL ONCE
Status: COMPLETED | OUTPATIENT
Start: 2024-05-13 | End: 2024-05-13

## 2024-05-13 RX ORDER — PREDNISONE 20 MG/1
20 TABLET ORAL DAILY
Status: DISCONTINUED | OUTPATIENT
Start: 2024-05-27 | End: 2024-05-16 | Stop reason: HOSPADM

## 2024-05-13 RX ADMIN — METOPROLOL TARTRATE 25 MG: 25 TABLET, FILM COATED ORAL at 09:17

## 2024-05-13 RX ADMIN — POTASSIUM CHLORIDE 40 MEQ: 1500 TABLET, EXTENDED RELEASE ORAL at 09:20

## 2024-05-13 RX ADMIN — POTASSIUM CHLORIDE 40 MEQ: 1500 TABLET, EXTENDED RELEASE ORAL at 17:25

## 2024-05-13 RX ADMIN — ALLOPURINOL 300 MG: 300 TABLET ORAL at 09:17

## 2024-05-13 RX ADMIN — RIVAROXABAN 20 MG: 20 TABLET, FILM COATED ORAL at 17:25

## 2024-05-13 RX ADMIN — PANTOPRAZOLE SODIUM 40 MG: 40 TABLET, DELAYED RELEASE ORAL at 06:14

## 2024-05-13 RX ADMIN — METOPROLOL TARTRATE 25 MG: 25 TABLET, FILM COATED ORAL at 20:50

## 2024-05-13 RX ADMIN — FINASTERIDE 1 MG: 1 TABLET, FILM COATED ORAL at 09:17

## 2024-05-13 RX ADMIN — PREDNISONE 40 MG: 20 TABLET ORAL at 17:25

## 2024-05-13 RX ADMIN — METOLAZONE 2.5 MG: 2.5 TABLET ORAL at 09:17

## 2024-05-13 RX ADMIN — FUROSEMIDE 60 MG: 10 INJECTION, SOLUTION INTRAMUSCULAR; INTRAVENOUS at 13:34

## 2024-05-13 RX ADMIN — SULFAMETHOXAZOLE AND TRIMETHOPRIM 1 TABLET: 800; 160 TABLET ORAL at 09:17

## 2024-05-13 RX ADMIN — LOSARTAN POTASSIUM 50 MG: 25 TABLET, FILM COATED ORAL at 09:20

## 2024-05-13 RX ADMIN — PREDNISONE 60 MG: 20 TABLET ORAL at 09:14

## 2024-05-13 RX ADMIN — POTASSIUM CHLORIDE 40 MEQ: 1500 TABLET, EXTENDED RELEASE ORAL at 13:29

## 2024-05-13 ASSESSMENT — ACTIVITIES OF DAILY LIVING (ADL)
ADLS_ACUITY_SCORE: 20
ADLS_ACUITY_SCORE: 21
ADLS_ACUITY_SCORE: 22
ADLS_ACUITY_SCORE: 20
ADLS_ACUITY_SCORE: 21
ADLS_ACUITY_SCORE: 20

## 2024-05-13 NOTE — PLAN OF CARE
Goal Outcome Evaluation:      Plan of Care Reviewed With: patient    Overall Patient Progress: no changeOverall Patient Progress: no change    Outcome Evaluation: Pt still requiring 2LPM NC    Orientation: A&Ox4  Activity: Ind in room, SBA for oxygen while ambulating in the halls  Diet/BS Checks: 2 gm NA restricted, no caffeine diet (1800 mL/day fluid restriction)   Tele:  SR with PACs  IV Access/Drains: PIV SL  Pain Management: Denies  Abnormal VS/Results: VSS on 2LPM NC  Bowel/Bladder: Continent B/B, voids in urinal.  No BM this shift.   Skin/Wounds: Sam MEJÍA, LE edema improved, scattered bruising  Consults: Pulmonology, Cardiology, Hospitalist   D/C Disposition: Pending respiratory status  Other Info: Hold today lasix dose pending creat.

## 2024-05-13 NOTE — PROGRESS NOTES
Summary: history of atrial fibrillation (recent ablation last week) who presents with his wife for evaluation of shortness of breath/edema    Date/Shift: 5/14/24 [id number]  Orientation: A&O x4  Activity: Independent in room. SBA in halls with O2 tank  Diet/BS Checks: 2 gm NA restricted, no caffeine diet (1800 mL/day fluid restriction)  Tele: NSR w/ occasional PVC's  IV Access/Drains: R PIV SL  Pain Management: Denies  Abnormal VS/Results: VSS on 1L NC, has been refusing CPAP at night. K+ 3.2 (replaced per protocol, recheck 3.8) .  Bowel/Bladder: Continent B/B, voids in urinal. No BM this shift  Skin/Wounds: Sam KYM, KYM mild edema, scattered bruising  Consults: Pulmonology, Cardiology, Hospitalist   D/C Disposition: Discharge home pending respiratory status

## 2024-05-13 NOTE — PLAN OF CARE
Orientation: A&O  Activity: Ind in room, SBA for oxygen while ambulating in the halls  Diet/BS Checks: 2 gm NA restricted, no caffeine diet (1800 mL/day fluid restriction)   Tele:  SR with PACs  IV Access/Drains: PIV SL  Pain Management: Denies  Abnormal VS/Results: Yonathan at times-metroprolol held, O2 stable on 2L NC, 3L with activity  Bowel/Bladder: Continent-good output after IV lasix, voids in urinal.  Senna given for reports of constipation.   Skin/Wounds: Sam LE, LE edema, scattered bruising  Consults: Pulmonology   D/C Disposition: Pending respiratory status  Other Info: Hold tomorrow lasix dose pending creat.  Ambulated in the hallway 2x this shift.

## 2024-05-13 NOTE — PROGRESS NOTES
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     Acute Hypoxic Respiratory Failure  Bilateral Ground Glass Opacities  Hx Amiodarone Pneumonitis  BOWEN on CPAP  Recent ablation for Afib with continued dyspnea and ultimately oxygen requirement. 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 At that time infectious including fungal workup negative. Limited initial response to 40 mg daily of prednisone but improvement on 60 mg and underwent several months long taper with greatly improved imaging March 2024. Currently viral testing negative, normal WBC, procal negative, no significant cough or fever and believe infection less likely. Has been diuresed effectively without any impact on symptoms. CRP and ESR both elevated. No imaging since March so unknown timeline for development of infiltrates. Believe relapse of inflammatory process is most likely,       T-patient had significant improvement in symptoms and oxygen status after starting prednisone.  - I have ordered prednisone taper.  - I will set this patient up to be seen by me in the next 2 to 3 months with a CT scan and PFTs.  - I have ordered extensive ILD panel to look for possible underlying interstitial lung disease.  Because it does not make sense that patient has been off of amiodarone for 6 months and he should get another amiodarone associated lung toxicity now.    Vinayak Valles MD    Pulmonary and Critical Care     Interval History:    Sarted on prednisone yesterday, no acute events overnight. Feeling about the same today. Asked how long it took to feel better when went on steroids for amiodarone toxicity last year and thinks it took about one week.     Review of Systems:  Complete 12 point ROS negative unless mentioned in HPI    Medications, Allergies:    Medications:  Current Facility-Administered Medications   Medication Dose Route Frequency Provider Last Rate Last Admin    allopurinol (ZYLOPRIM) tablet 300 mg  300 mg Oral Daily Rico Lemus, DO   300 mg at 05/13/24 0917    finasteride (PROPECIA) tablet 1 mg  1 mg Oral Daily Rico Lemus, DO   1 mg at 05/13/24 0917    furosemide (LASIX) injection 60 mg  60 mg Intravenous Daily Daiana Abarca MD   60 mg at 05/13/24 1334    losartan (COZAAR) tablet 50 mg  50 mg Oral Daily Rico Lemus, DO   50 mg at 05/13/24 0920    metolazone (ZAROXOLYN) tablet 2.5 mg  2.5 mg Oral Daily Anastacio Morrissey MD   2.5 mg at 05/13/24 0917    metoprolol tartrate (LOPRESSOR) tablet 25 mg  25 mg Oral BID Rico Lemus, DO   25 mg at 05/13/24 0917    pantoprazole (PROTONIX) EC tablet 40 mg  40 mg Oral QAM AC Rico Lemus, DO   40 mg at 05/13/24 0614    potassium chloride pushpa ER (KLOR-CON M20) CR tablet 40 mEq  40 mEq Oral BID Rico Lemus, DO   40 mEq at 05/13/24 0920    predniSONE (DELTASONE) tablet 40 mg  40 mg Oral Daily Vinayak Valles MD        Followed by    [START ON 5/27/2024] predniSONE (DELTASONE) tablet 20 mg  20 mg Oral Daily Vinayak Valles MD        Followed by    [START ON 6/26/2024] predniSONE (DELTASONE) tablet 10 mg  10 mg Oral Daily Vinayak Valles MD        Followed by    [START ON 8/10/2024] predniSONE (DELTASONE) tablet 5 mg  5 mg Oral Daily Vinayak Valles MD        rivaroxaban ANTICOAGULANT (XARELTO) tablet 20 mg  20 mg Oral Daily with supper Rico Lemus, DO   20 mg at 05/12/24 1821    sodium chloride (PF) 0.9% PF flush 3 mL  3 mL  Intracatheter Q8H Trae Lemusn P, DO   3 mL at 05/13/24 1340    sulfamethoxazole-trimethoprim (BACTRIM DS) 800-160 MG per tablet 1 tablet  1 tablet Oral Daily Lubna Mejias MD   1 tablet at 05/13/24 0917     Current Facility-Administered Medications   Medication Dose Route Frequency Provider Last Rate Last Admin    - MEDICATION INSTRUCTIONS -   Does not apply DOES NOT GO TO Rico Herring P, DO        acetaminophen (TYLENOL) tablet 650 mg  650 mg Oral Q4H PRN Rico Lemus P, DO        Or    acetaminophen (TYLENOL) Suppository 650 mg  650 mg Rectal Q4H PRN Rico Lemus P, DO        Continuing ACE inhibitor/ARB/ARNI from home medication list OR ACE inhibitor/ARB/ARNI order already placed during this visit   Does not apply DOES NOT GO TO Rico Herring P, DO        Continuing beta blocker from home medication list OR beta blocker order already placed during this visit   Does not apply DOES NOT GO TO Rico Herring P, DO        lidocaine (LMX4) cream   Topical Q1H PRN Rico Lemus P, DO        lidocaine 1 % 0.1-1 mL  0.1-1 mL Other Q1H PRN Rico Lemus P, DO        melatonin tablet 1 mg  1 mg Oral At Bedtime PRN Rico Lemus P, DO        ondansetron (ZOFRAN ODT) ODT tab 4 mg  4 mg Oral Q6H PRN Rico Lemus P, DO        Or    ondansetron (ZOFRAN) injection 4 mg  4 mg Intravenous Q6H PRN Rico Lemus P, DO        Patient is already receiving anticoagulation with heparin, enoxaparin (LOVENOX), warfarin (COUMADIN)  or other anticoagulant medication   Does not apply Continuous PRN Trae Lemusn P, DO        senna-docusate (SENOKOT-S/PERICOLACE) 8.6-50 MG per tablet 1 tablet  1 tablet Oral BID PRN Tare Lemusn P, DO        Or    senna-docusate (SENOKOT-S/PERICOLACE) 8.6-50 MG per tablet 2 tablet  2 tablet Oral BID PRN Allan Rico P, DO   2 tablet at 05/12/24 1350    sodium chloride (PF) 0.9% PF flush 10 mL  10 mL Intravenous Q10 Min PRN Rocky Marinelli MD        sodium chloride (PF) 0.9% PF  flush 3 mL  3 mL Intracatheter q1 min prn Rico Lemus DO        sodium chloride (PF) 0.9% PF flush 5-10 mL  5-10 mL Intravenous q1 min prn Rocky Marinelli MD         Allergies:     Allergies   Allergen Reactions    Amiodarone Shortness Of Breath     Suspected amiodarone toxicity involving lungs     Physical Exam:    Temp:  [97.1  F (36.2  C)-98.3  F (36.8  C)] 98.3  F (36.8  C)  Pulse:  [63-68] 67  Resp:  [18-20] 18  BP: ()/(70-83) 121/70  SpO2:  [90 %-95 %] 92 %      Intake/Output Summary (Last 24 hours) at 5/10/2024 1925  Last data filed at 5/10/2024 1552  Gross per 24 hour   Intake 755 ml   Output 3580 ml   Net -2825 ml     General: Sitting up at side of bed in NAD  HEENT: anicteric, moist mucosa  Chest: CTAB, some crackles posteriorly, no wheezing  Cardiac: RRR no murmurs  Abdomen: Soft, flat, non tender, active BS  Extremities: No LE Edema  Neuro: A&Ox3, no focal deficits   Skin: no rash noted    Laboratory, imaging, and microbiologic data:    All laboratory and imaging data reviewed, pertinent results discussed above.

## 2024-05-13 NOTE — PLAN OF CARE
"Nursing shift [id number] update  Orientation: A&O  Activity: Ind in room with O2 at 2LPM, Was up on chair x1. Needs SBA for oxygen tank while ambulating in the halls  Lungs: Clear. Denies SOB at rest but does Have DELEON with ambulation. Lungs clear bilaterally. No coughing. Pt was using his CPAP during sleep at night at home PTA. However has been refusing CPAP here. Writer explained the benefits of,  and strongly encouraged the use of the CPAP here during sleep to improve  his respiratory status. Pt agreed to try will try our CPAP machine. Writer spoke with RT \"Myke\" and arranged for RT to bring and set=up up CPAP machine for use at night starting tonight. Remains on Lasix  Diet/BS Checks: 2 gm NA restricted, no caffeine diet (1800 mL/day fluid restriction) Great appetite, good intake po. Takes pills whole. Writer instructed pt to take the  larger pills on spoonful of applesauce (preferred ) or pudding. He was pleased with how that helped to ease the swallowing of those larger pills  Tele:  NSR with prolonged QT and occasional PVC's  IV Access/Drains: PIV SL  Pain Management: Denies  Abnormal VS/Results: VSS on 2LPM NC. Remains on K+ protocal. K+ replaced this am for K+ of 3.2. .  Bowel/Bladder: Continent B/B, voids in urinal.  Last BM was 5-12-24   Skin/Wounds: KYM Wooten mild edema, scattered bruising  Consults: Pulmonology, Cardiology, Hospitalist   D/C Disposition: Discharge home pending respiratory status    "

## 2024-05-13 NOTE — PROGRESS NOTES
[facility]    Medicine Progress Note - Hospitalist Service       Date of Admission:  5/5/2024    Assessment & Plan   Mike Schultz is a 73 year old male with hx of a-fib refractory to medical therapy s/p PVI in 2017 with re-isolation of reconnected PVs in 2019, recent redo ablation (5/2/24), HFpEF, aortic stenosis, hx of amiodarone pneumonitis, and BOWEN on CPAP who presented to the ED on 5/5/24 for evaluation of progressive shortness of breath, and was found to have acute hypoxic respiratory failure which has been attributed to relapse of inflammatory process and acute CHF. Cardiology and Pulmonology following this admission    Pertinent clinical history:  *11/2023: Hospitalized with acute hypoxic respiratory failure, diagnosed with amiodarone-induced lung toxicity at that time. Treated with prolonged course of steroids  *12/2023: outpatient PFTs consistent with mild restriction  *02/2024: Hospitalized with acute HFpEF in the setting of recurrent a-fib/RVR  *03/2024: Chest CT showed improvement in bilateral interstitial pulmonary infiltrates with likely component of fibrosis     *05/05/2024: Presented on this admission with progressive shortness of breath following recent ablation on 5/2. Had tried increasing his diuretics at home without improvement. Hospital course noted below      # Acute hypoxic respiratory failure, multifactorial  # Bilateral groundglass opacities  # Acute on chronic HFpEF  # Moderate aortic stenosis  # Hx of amiodarone pneumonitis  *NTpBNP >1200 in the ED. CXR showed diffuse indistinctness of the pulmonary interstitium with question of inflammation vs edema; concern for patchy opacity involving the retrocardiac region of the lung base and lateral aspect of right middle lobe concerning for infection   *Pt has been afebrile with normal WBC and procal on admission. Resp viral panel and COVID-19 negative.  *Initiated on IV Lasix on admission. Cardiology consulted.  *5/6  TTE: EF 55%, mild LVH, moderate aortic stenosis - MG 35mmHg; grade 3/advanced diastolic dysfunction.   *5/7: Started on metolazone per Cardiology  *5/8 Repeat CXR favoring pulmonary edema   *5/8 Chest CT (5/8) showed extensive bilateral groundglass opacities with reactive lymph nodes  *5/9: Pulm consulted; felt presentation with consistent with relapse of inflammatory lung process. CRP elevated >100; steroids initiated with improvement. Bactrim also initiated for PJP ppx.   - On 2 lpm/NC, wean as tolerated  - Cont Lasix 60 mg IV daily  - Cont metolazone 2.5 mg daily as per Cardiology  - On 1800 mL fluid restriction  - Cont prednisone 60 mg daily as per Pulm  - Cont Bactrim for PJP ppx while on steroids  - Cardiology recommending right and left heart cath, timing contingent on improvement of respiratory status  - Cardiac rehab     Elevated troponin, suspect demand ischemia/NSTEMI type 2  Abnormal stress test  *Initial troponin 90 with flat trend (85). EKG non-ischemic. ACS felt unlikely per Cardiology  *TTE this admission showed preserved EF, but difficult to assess for WMA  *Nuclear stress test (5/9) showed small area of ischemia; per Cardiology could be false positive due to body habitus  - Cardiology recommending right and left heart cath pending improvement in respiratory status    Recurrent atrial fibrillation s/p DCCV and amiodarone with recent redo ablation on 5/2/24 with Dr. Day   *Home regimen: metoprolol 25 mg BID, rivaroxaban  - Cont home meds     Hypertension  *Home regimen: metoprolol 25 mg BID, losartan 50 mg daily, torsemide 80 mg qAM, 40 mg qPM  - Cont metoprolol and losartan  - Holding torsemide. On diuretics this stay as per Cardiology       Prolonged QT interval  *Has chronically elevated QTc in 500s range     Obstructive sleep apnea  *Chronic and stable on CPAP    Gout  *Chronic and stable on allopurinol    GERD  *Chronic and stable on PPI    BPH  *Chronic and stable on finasteride     Diet:  Combination Diet 2 gm NA Diet; No Caffeine Diet (and additional linked orders)  Fluid restriction 1800 ML FLUID (and additional linked orders)    DVT Prophylaxis: DOAC  Palmer Catheter: Not present  Code Status: Full Code         Disposition: Expected discharge home once cleared by Cardiology and Pulm, ?5/16    The patient's care was discussed with the Care Coordinator/ and Patient.    Daiana Abarca MD  Hospitalist Service  [facility]  Contact information available via [facility] Paging/Directory    ______________________________________________________________________    Interval History   No acute events overnight. Offers no complaints. Respiratory status stable on 2L. Desatted to 79% when he took off his O2 to shave this morning. Hoping to wean off O2 prior to discharge.     Data reviewed today: I reviewed all medications, new labs and imaging results over the last 24 hours. I personally reviewed no images or EKG's today.    Physical Exam   Vital Signs: Temp: 97.5  F (36.4  C) Temp src: Oral BP: 114/83 Pulse: 64   Resp: 20 SpO2: 93 % O2 Device: Nasal cannula Oxygen Delivery: 2 LPM  Weight: 281 lbs 12.8 oz  Constitutional: Resting comfortably, NAD  HEENT: Sclera white, MMM  Respiratory: Breathing non-labored. Lungs CTAB - no wheezes, crackles, or rhonchi  Cardiovascular: Heart RRR, +systolic murmur. Mild BLE edema  GI: +BS, abd soft/NT  Skin/Integument: No rash  Musculoskeletal: Normal muscle bulk and tone  Neuro: Alert and appropriate, BUTLER  Psych: Calm and cooperative    Data   Recent Labs   Lab 05/12/24  0913 05/11/24  0727 05/10/24  0824 05/09/24  0800   WBC 12.5* 15.2*  --  10.3   HGB 14.1 13.2*  --  13.6   * 98  --  99    358  --  315    138 136 135   POTASSIUM 3.5 3.5 3.7 3.6   CHLORIDE 97* 98 95* 95*   CO2 32* 29 28 28   BUN 38.2* 40.3* 38.8* 26.1*   CR 1.19* 1.11 1.22* 1.02   ANIONGAP 11 11 13 12   ASHELY 9.4 9.4 9.6 9.4   * 104* 143* 110*          No results found for this or any previous visit (from the past 24 hour(s)).    Medications   Current Facility-Administered Medications   Medication Dose Route Frequency Provider Last Rate Last Admin    - MEDICATION INSTRUCTIONS -   Does not apply DOES NOT GO TO Rico Herring DO        Continuing ACE inhibitor/ARB/ARNI from home medication list OR ACE inhibitor/ARB/ARNI order already placed during this visit   Does not apply DOES NOT GO TO Rcio Herring DO        Continuing beta blocker from home medication list OR beta blocker order already placed during this visit   Does not apply DOES NOT GO TO Rico Herring DO        Patient is already receiving anticoagulation with heparin, enoxaparin (LOVENOX), warfarin (COUMADIN)  or other anticoagulant medication   Does not apply Continuous PRN Rico Lemus DO         Current Facility-Administered Medications   Medication Dose Route Frequency Provider Last Rate Last Admin    allopurinol (ZYLOPRIM) tablet 300 mg  300 mg Oral Daily Rico Lemus, DO   300 mg at 05/12/24 1018    finasteride (PROPECIA) tablet 1 mg  1 mg Oral Daily Rico Lemus DO   1 mg at 05/12/24 1018    [Held by provider] furosemide (LASIX) injection 60 mg  60 mg Intravenous Daily Tawny Alcantar MD   60 mg at 05/12/24 1017    losartan (COZAAR) tablet 50 mg  50 mg Oral Daily Rico Lemus, DO   50 mg at 05/12/24 1018    metolazone (ZAROXOLYN) tablet 2.5 mg  2.5 mg Oral Daily Anastacio Morrissey MD   2.5 mg at 05/12/24 1018    metoprolol tartrate (LOPRESSOR) tablet 25 mg  25 mg Oral BID Rico Lemus, DO   25 mg at 05/11/24 2044    pantoprazole (PROTONIX) EC tablet 40 mg  40 mg Oral QAM AC Rico Lemus, DO   40 mg at 05/12/24 1018    potassium chloride pushpa ER (KLOR-CON M20) CR tablet 40 mEq  40 mEq Oral BID Rico Lemus, DO   40 mEq at 05/12/24 1821    predniSONE (DELTASONE) tablet 60 mg  60 mg Oral Daily Lubna Mejias MD   60 mg at 05/12/24 1017     rivaroxaban ANTICOAGULANT (XARELTO) tablet 20 mg  20 mg Oral Daily with supper Rico Lemus DO   20 mg at 05/12/24 1821    sodium chloride (PF) 0.9% PF flush 3 mL  3 mL Intracatheter Q8H Rico Lemus DO   3 mL at 05/12/24 2107    sulfamethoxazole-trimethoprim (BACTRIM DS) 800-160 MG per tablet 1 tablet  1 tablet Oral Daily Lubna Mejias MD   1 tablet at 05/12/24 1018

## 2024-05-13 NOTE — PROGRESS NOTES
Orientation: A&O  Activity: Ind   Diet/BS Checks: 2 gm NA restricted, no caffeine diet (1800 mL/day fluid restriction)   IV Access/Drains: PIV   Pain Management: Denies  Abnormal VS/Results: Low BP -metroprolol held, O2 stable on 2L NC  Bowel/Bladder: Continent  Skin/Wounds: Sam LE, LE edema, scattered bruising  Consults: Pulmonology   D/C Disposition: Pending respiratory status  Other Info: Hold tomorrow lasix dose pending creat.

## 2024-05-14 ENCOUNTER — APPOINTMENT (OUTPATIENT)
Dept: OCCUPATIONAL THERAPY | Facility: CLINIC | Age: 74
DRG: 273 | End: 2024-05-14
Payer: COMMERCIAL

## 2024-05-14 LAB
ANA SER QL IF: NEGATIVE
ANCA AB PATTERN SER IF-IMP: NORMAL
ANION GAP SERPL CALCULATED.3IONS-SCNC: 16 MMOL/L (ref 7–15)
BUN SERPL-MCNC: 34.6 MG/DL (ref 8–23)
C-ANCA TITR SER IF: NORMAL {TITER}
CALCIUM SERPL-MCNC: 9.8 MG/DL (ref 8.8–10.2)
CCP AB SER IA-ACNC: 1.5 U/ML
CHLORIDE SERPL-SCNC: 96 MMOL/L (ref 98–107)
CREAT SERPL-MCNC: 1.17 MG/DL (ref 0.67–1.17)
DEPRECATED HCO3 PLAS-SCNC: 26 MMOL/L (ref 22–29)
EGFRCR SERPLBLD CKD-EPI 2021: 66 ML/MIN/1.73M2
ENA SCL70 IGG SER IA-ACNC: <0.6 U/ML
ENA SCL70 IGG SER IA-ACNC: NEGATIVE
ENA SS-A AB SER IA-ACNC: 0.6 U/ML
ENA SS-A AB SER IA-ACNC: NEGATIVE
ENA SS-B IGG SER IA-ACNC: <0.6 U/ML
ENA SS-B IGG SER IA-ACNC: NEGATIVE
ERYTHROCYTE [DISTWIDTH] IN BLOOD BY AUTOMATED COUNT: 13.8 % (ref 10–15)
GLUCOSE SERPL-MCNC: 125 MG/DL (ref 70–99)
HCT VFR BLD AUTO: 45.6 % (ref 40–53)
HGB BLD-MCNC: 14.9 G/DL (ref 13.3–17.7)
MCH RBC QN AUTO: 31.8 PG (ref 26.5–33)
MCHC RBC AUTO-ENTMCNC: 32.7 G/DL (ref 31.5–36.5)
MCV RBC AUTO: 97 FL (ref 78–100)
PLATELET # BLD AUTO: 413 10E3/UL (ref 150–450)
POTASSIUM SERPL-SCNC: 3.5 MMOL/L (ref 3.4–5.3)
RBC # BLD AUTO: 4.68 10E6/UL (ref 4.4–5.9)
SODIUM SERPL-SCNC: 138 MMOL/L (ref 135–145)
WBC # BLD AUTO: 16.7 10E3/UL (ref 4–11)

## 2024-05-14 PROCEDURE — 80048 BASIC METABOLIC PNL TOTAL CA: CPT | Performed by: HOSPITALIST

## 2024-05-14 PROCEDURE — 250N000013 HC RX MED GY IP 250 OP 250 PS 637: Performed by: STUDENT IN AN ORGANIZED HEALTH CARE EDUCATION/TRAINING PROGRAM

## 2024-05-14 PROCEDURE — 36415 COLL VENOUS BLD VENIPUNCTURE: CPT | Performed by: HOSPITALIST

## 2024-05-14 PROCEDURE — 120N000001 HC R&B MED SURG/OB

## 2024-05-14 PROCEDURE — 250N000013 HC RX MED GY IP 250 OP 250 PS 637: Performed by: INTERNAL MEDICINE

## 2024-05-14 PROCEDURE — 97110 THERAPEUTIC EXERCISES: CPT | Mod: GO

## 2024-05-14 PROCEDURE — 85041 AUTOMATED RBC COUNT: CPT | Performed by: HOSPITALIST

## 2024-05-14 PROCEDURE — 99233 SBSQ HOSP IP/OBS HIGH 50: CPT | Performed by: INTERNAL MEDICINE

## 2024-05-14 PROCEDURE — 250N000011 HC RX IP 250 OP 636: Performed by: INTERNAL MEDICINE

## 2024-05-14 PROCEDURE — 250N000012 HC RX MED GY IP 250 OP 636 PS 637: Performed by: INTERNAL MEDICINE

## 2024-05-14 RX ADMIN — RIVAROXABAN 20 MG: 20 TABLET, FILM COATED ORAL at 17:04

## 2024-05-14 RX ADMIN — SULFAMETHOXAZOLE AND TRIMETHOPRIM 1 TABLET: 800; 160 TABLET ORAL at 08:43

## 2024-05-14 RX ADMIN — METOPROLOL TARTRATE 25 MG: 25 TABLET, FILM COATED ORAL at 21:04

## 2024-05-14 RX ADMIN — FINASTERIDE 1 MG: 1 TABLET, FILM COATED ORAL at 08:43

## 2024-05-14 RX ADMIN — SENNOSIDES AND DOCUSATE SODIUM 1 TABLET: 50; 8.6 TABLET ORAL at 05:47

## 2024-05-14 RX ADMIN — FUROSEMIDE 60 MG: 10 INJECTION, SOLUTION INTRAMUSCULAR; INTRAVENOUS at 09:04

## 2024-05-14 RX ADMIN — POTASSIUM CHLORIDE 40 MEQ: 1500 TABLET, EXTENDED RELEASE ORAL at 08:42

## 2024-05-14 RX ADMIN — POTASSIUM CHLORIDE 40 MEQ: 1500 TABLET, EXTENDED RELEASE ORAL at 17:04

## 2024-05-14 RX ADMIN — PANTOPRAZOLE SODIUM 40 MG: 40 TABLET, DELAYED RELEASE ORAL at 05:48

## 2024-05-14 RX ADMIN — METOLAZONE 2.5 MG: 2.5 TABLET ORAL at 09:02

## 2024-05-14 RX ADMIN — ALLOPURINOL 300 MG: 300 TABLET ORAL at 08:43

## 2024-05-14 RX ADMIN — PREDNISONE 40 MG: 20 TABLET ORAL at 08:43

## 2024-05-14 RX ADMIN — LOSARTAN POTASSIUM 50 MG: 25 TABLET, FILM COATED ORAL at 09:00

## 2024-05-14 ASSESSMENT — ACTIVITIES OF DAILY LIVING (ADL)
ADLS_ACUITY_SCORE: 21
ADLS_ACUITY_SCORE: 20
ADLS_ACUITY_SCORE: 20
ADLS_ACUITY_SCORE: 21
ADLS_ACUITY_SCORE: 21
ADLS_ACUITY_SCORE: 20
ADLS_ACUITY_SCORE: 21
ADLS_ACUITY_SCORE: 21
ADLS_ACUITY_SCORE: 20
ADLS_ACUITY_SCORE: 20
ADLS_ACUITY_SCORE: 21
ADLS_ACUITY_SCORE: 20
ADLS_ACUITY_SCORE: 21
ADLS_ACUITY_SCORE: 20
ADLS_ACUITY_SCORE: 21

## 2024-05-14 NOTE — PROGRESS NOTES
[facility]    Medicine Progress Note - Hospitalist Service       Date of Admission:  5/5/2024    Assessment & Plan   Mike Schultz is a 73 year old male with hx of a-fib refractory to medical therapy s/p PVI in 2017 with re-isolation of reconnected PVs in 2019, recent redo ablation (5/2/24), HFpEF, aortic stenosis, hx of amiodarone pneumonitis, and BOWEN on CPAP who presented to the ED on 5/5/24 for evaluation of progressive shortness of breath, and was found to have acute hypoxic respiratory failure which has been attributed to relapse of inflammatory process and acute CHF. Cardiology and Pulmonology following this admission    Pertinent clinical history:  *11/2023: Hospitalized with acute hypoxic respiratory failure, diagnosed with amiodarone-induced lung toxicity at that time. Treated with prolonged course of steroids  *12/2023: outpatient PFTs consistent with mild restriction  *02/2024: Hospitalized with acute HFpEF in the setting of recurrent a-fib/RVR  *03/2024: Chest CT showed improvement in bilateral interstitial pulmonary infiltrates with likely component of fibrosis     *05/05/2024: Presented on this admission with progressive shortness of breath following recent ablation on 5/2. Had tried increasing his diuretics at home without improvement. Hospital course noted below      # Acute hypoxic respiratory failure, multifactorial  # Bilateral groundglass opacities of unclear etiology  # Acute on chronic HFpEF  # Moderate aortic stenosis  # Hx of amiodarone pneumonitis  *NTpBNP >1200 in the ED. CXR showed diffuse indistinctness of the pulmonary interstitium with question of inflammation vs edema; concern for patchy opacity involving the retrocardiac region of the lung base and lateral aspect of right middle lobe concerning for infection   *Pt has been afebrile with normal WBC and procal on admission. Resp viral panel and COVID-19 negative.  *Initiated on IV Lasix on admission.  Cardiology consulted.  *5/6 TTE: EF 55%, mild LVH, moderate aortic stenosis - MG 35mmHg; grade 3/advanced diastolic dysfunction.   *5/7: Started on metolazone per Cardiology  *5/8 Repeat CXR favoring pulmonary edema   *5/8 Chest CT (5/8) showed extensive bilateral groundglass opacities with reactive lymph nodes  *5/9: Pulm consulted; felt presentation with consistent with relapse of inflammatory lung process. CRP elevated >100; steroids initiated with improvement. Bactrim also initiated for PJP ppx.   - On 1 lpm/NC, wean as tolerated  - Cont Lasix 60 mg IV daily for now  - Cont metolazone 2.5 mg daily as per Cardiology  - On 1800 mL fluid restriction  - Cont PTA KCl 40 meq BID while on diuretics  - On prednisone 40 mg daily with prolonged taper as per Pulm  - Cont Bactrim for PJP ppx while on steroids  - ILD panel ordered by Pulm  - Pulm will arrange outpatient follow up in 2-3 months with repeat CT and PFTs  - Cardiology recommending right and left heart cath after discharge once back to baseline resp status  - Cardiac rehab     Elevated troponin, suspect demand ischemia/NSTEMI type 2  Abnormal stress test  *Initial troponin 90 with flat trend (85). EKG non-ischemic. ACS felt unlikely per Cardiology  *TTE this admission showed preserved EF, but difficult to assess for WMA  *Nuclear stress test (5/9) showed small area of ischemia; per Cardiology could be false positive due to body habitus  - Cardiology recommending right and left heart cath after discharge once back to baseline resp status    Recurrent atrial fibrillation s/p DCCV and amiodarone with recent redo ablation on 5/2/24 with Dr. Day   Sinus bradycardia  *Home regimen: metoprolol 25 mg BID, rivaroxaban  *Bradycardia to 40s-50s noted this admission  - Metoprolol held this morning due to bradycardia  - Decrease metoprolol from 25 to 12.5 mg BID     Hypertension  *Home regimen: metoprolol 25 mg BID, losartan 50 mg daily, torsemide 80 mg qAM, 40 mg qPM  -  Metoprolol dose decreased for bradycardia as noted above  - Holding torsemide while on IV Lasix  - Cont losartan       Prolonged QT interval  *Has chronically elevated QTc in 500s range     Obstructive sleep apnea  *Chronic and stable on CPAP    Gout  *Chronic and stable on allopurinol    GERD  *Chronic and stable on PPI    BPH  *Chronic and stable on finasteride     Diet: Combination Diet 2 gm NA Diet; No Caffeine Diet (and additional linked orders)  Fluid restriction 1800 ML FLUID (and additional linked orders)    DVT Prophylaxis: DOAC  Palmer Catheter: Not present  Code Status: Full Code         Disposition: Expected discharge home once weaned off O2, 1-2 more days    The patient's care was discussed with the Bedside Nurse and Patient.    Daiana Abarca MD  Hospitalist Service  [facility]  Contact information available via [facility] Paging/Directory    ______________________________________________________________________    Interval History   Bradycardic to 40s overnight, but asymptomatic. HR 50s this morning, asymptomatic. Reports DELEON, but overall improved. O2 needs and weight decreasing. Denies chest pain, dizziness/lightheadedness.   - Cont IV Lasix  - Wean O2 as tolerated    Data reviewed today: I reviewed all medications, new labs and imaging results over the last 24 hours. I personally reviewed no images or EKG's today.    Physical Exam   Vital Signs: Temp: 97.3  F (36.3  C) Temp src: Oral BP: 123/66 Pulse: (P) 52   Resp: (P) 18 SpO2: (P) 95 % (at rest in chair) O2 Device: (P) None (Room air) Oxygen Delivery: 1 LPM  Weight: 279 lbs 12.8 oz  Constitutional: Resting comfortably, NAD  HEENT: Sclera white, MMM  Respiratory: Breathing non-labored. Lungs CTAB - no wheezes, crackles, or rhonchi  Cardiovascular: Heart RRR, +systolic murmur. Mild BLE edema  GI: +BS, abd soft/NT  Skin/Integument: No rash  Musculoskeletal: Normal muscle bulk and tone  Neuro: Alert and appropriate,  BUTLER  Psych: Calm and cooperative    Data   Recent Labs   Lab 05/13/24  1639 05/13/24  0943 05/12/24  0913 05/11/24  0727   WBC  --  14.8* 12.5* 15.2*   HGB  --  14.6 14.1 13.2*   MCV  --  98 101* 98   PLT  --  389 374 358   NA  --  137 140 138   POTASSIUM 3.8 3.2* 3.5 3.5   CHLORIDE  --  97* 97* 98   CO2  --  27 32* 29   BUN  --  35.0* 38.2* 40.3*   CR  --  1.14 1.19* 1.11   ANIONGAP  --  13 11 11   ASHELY  --  9.5 9.4 9.4   GLC  --  141* 103* 104*         No results found for this or any previous visit (from the past 24 hour(s)).    Medications   Current Facility-Administered Medications   Medication Dose Route Frequency Provider Last Rate Last Admin    - MEDICATION INSTRUCTIONS -   Does not apply DOES NOT GO TO Rico Herring DO        Continuing ACE inhibitor/ARB/ARNI from home medication list OR ACE inhibitor/ARB/ARNI order already placed during this visit   Does not apply DOES NOT GO TO Rico Herring DO        Continuing beta blocker from home medication list OR beta blocker order already placed during this visit   Does not apply DOES NOT GO TO Rico Herring DO        Patient is already receiving anticoagulation with heparin, enoxaparin (LOVENOX), warfarin (COUMADIN)  or other anticoagulant medication   Does not apply Continuous PRN Rico Lemus DO         Current Facility-Administered Medications   Medication Dose Route Frequency Provider Last Rate Last Admin    allopurinol (ZYLOPRIM) tablet 300 mg  300 mg Oral Daily Rico Lemus DO   300 mg at 05/14/24 0843    finasteride (PROPECIA) tablet 1 mg  1 mg Oral Daily Rico Lemus DO   1 mg at 05/14/24 0843    furosemide (LASIX) injection 60 mg  60 mg Intravenous Daily Daiana Abarca MD   60 mg at 05/14/24 0904    losartan (COZAAR) tablet 50 mg  50 mg Oral Daily Rico Lemus DO   50 mg at 05/14/24 0900    metolazone (ZAROXOLYN) tablet 2.5 mg  2.5 mg Oral Daily Anastacio Morrissey MD   2.5 mg at 05/14/24 0902    metoprolol  tartrate (LOPRESSOR) tablet 25 mg  25 mg Oral BID Rico Lemus, DO   25 mg at 05/13/24 2050    pantoprazole (PROTONIX) EC tablet 40 mg  40 mg Oral QAM AC Rico Lemus, DO   40 mg at 05/14/24 0548    potassium chloride pushpa ER (KLOR-CON M20) CR tablet 40 mEq  40 mEq Oral BID Rico Lemus, DO   40 mEq at 05/14/24 0842    predniSONE (DELTASONE) tablet 40 mg  40 mg Oral Daily Vinayak Valles MD   40 mg at 05/14/24 0843    Followed by    [START ON 5/27/2024] predniSONE (DELTASONE) tablet 20 mg  20 mg Oral Daily Vinayak Valles MD        Followed by    [START ON 6/26/2024] predniSONE (DELTASONE) tablet 10 mg  10 mg Oral Daily Vinayak Valles MD        Followed by    [START ON 8/10/2024] predniSONE (DELTASONE) tablet 5 mg  5 mg Oral Daily Vinayak Valles MD        rivaroxaban ANTICOAGULANT (XARELTO) tablet 20 mg  20 mg Oral Daily with supper Rico Lemus, DO   20 mg at 05/13/24 1725    sodium chloride (PF) 0.9% PF flush 3 mL  3 mL Intracatheter Q8H Rico Lemus, DO   3 mL at 05/14/24 0549    sulfamethoxazole-trimethoprim (BACTRIM DS) 800-160 MG per tablet 1 tablet  1 tablet Oral Daily Lubna Mejias MD   1 tablet at 05/14/24 0843

## 2024-05-14 NOTE — PLAN OF CARE
Nursing update [id number]  Orientation: A&Ox4  Activity: independent in room   Diet/BS Checks: 2 gram NA, 1800 mL FR.   Tele:  Irregular rate sinus bradycardia with occasional PVC's and prolonged QT (has had long QT's before on past EKG's). Dr LISET Abarca was notified - Metoprolol held  IV Access/Drains: R PIV SL.   Pain Management: denied pain  Abnormal VS/Results: VSS except bradycardia. Weaned off O2 when at rest in bed or chair, but desats on Oxygen ar 3LPM when ambulating in [place] with RT. Pt will thus need home oxygen at discharge - MD aware  Bowel/Bladder: continent B/B. Had formed small BM this shift. Uses the  urinal.   Skin/Wounds: R shin scab. Sam BLE.   Consults: cardiology, pulmonology.   D/C Disposition: pending  FYI that on 5-13-24 Pulmonologist Dr. Valles started prednisone taper and requested next follow-up in next 2 to 3 months with a CT scan and PFTs.

## 2024-05-14 NOTE — PROGRESS NOTES
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     Acute Hypoxic Respiratory Failure  Bilateral Ground Glass Opacities  Hx Amiodarone Pneumonitis  BOWEN on CPAP  Recent ablation for Afib with continued dyspnea and ultimately oxygen requirement. 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 At that time infectious including fungal workup negative. Limited initial response to 40 mg daily of prednisone but improvement on 60 mg and underwent several months long taper with greatly improved imaging March 2024. Currently viral testing negative, normal WBC, procal negative, no significant cough or fever and believe infection less likely. Has been diuresed effectively without any impact on symptoms. CRP and ESR both elevated. No imaging since March so unknown timeline for development of infiltrates. Believe relapse of inflammatory process is most likely,       T-patient had significant improvement in symptoms and oxygen status after starting prednisone.  - I have ordered prednisone taper.  -Also placed him on bactrim.   - I will set this patient up to be seen by me in the next 2 to 3 months with a CT scan and PFTs.  - I have ordered extensive ILD panel to look for possible underlying interstitial lung disease.  Because it does not make sense that patient has been off of amiodarone for 6 months and he should get another amiodarone associated lung  toxicity now.    Vinayak Valles MD   Pulmonary and Critical Care     Interval History:    Sarted on prednisone yesterday, no acute events overnight. Feeling about the same today. Asked how long it took to feel better when went on steroids for amiodarone toxicity last year and thinks it took about one week.     Review of Systems:  Complete 12 point ROS negative unless mentioned in HPI    Medications, Allergies:    Medications:  Current Facility-Administered Medications   Medication Dose Route Frequency Provider Last Rate Last Admin    allopurinol (ZYLOPRIM) tablet 300 mg  300 mg Oral Daily Rico Lemus, DO   300 mg at 05/14/24 0843    finasteride (PROPECIA) tablet 1 mg  1 mg Oral Daily Rico Lemus, DO   1 mg at 05/14/24 0843    furosemide (LASIX) injection 60 mg  60 mg Intravenous Daily Daiana Abarca MD   60 mg at 05/14/24 0904    losartan (COZAAR) tablet 50 mg  50 mg Oral Daily Rcio Lemus, DO   50 mg at 05/14/24 0900    metolazone (ZAROXOLYN) tablet 2.5 mg  2.5 mg Oral Daily Anastacio Morrissey MD   2.5 mg at 05/14/24 0902    metoprolol tartrate (LOPRESSOR) tablet 25 mg  25 mg Oral BID Rico Lemus, DO   25 mg at 05/13/24 2050    pantoprazole (PROTONIX) EC tablet 40 mg  40 mg Oral QAM AC Rico Lemus, DO   40 mg at 05/14/24 0548    potassium chloride pushpa ER (KLOR-CON M20) CR tablet 40 mEq  40 mEq Oral BID Rico Lemus, DO   40 mEq at 05/14/24 0842    predniSONE (DELTASONE) tablet 40 mg  40 mg Oral Daily Vinayak Valles MD   40 mg at 05/14/24 0843    Followed by    [START ON 5/27/2024] predniSONE (DELTASONE) tablet 20 mg  20 mg Oral Daily Vinayak Valles MD        Followed by    [START ON 6/26/2024] predniSONE (DELTASONE) tablet 10 mg  10 mg Oral Daily Vinayak Valles MD        Followed by    [START ON 8/10/2024] predniSONE (DELTASONE) tablet 5 mg  5 mg Oral Daily Vinayak Valles MD        rivaroxaban ANTICOAGULANT (XARELTO) tablet 20 mg  20 mg Oral Daily with supper Rico Lemus DO   20 mg at 05/13/24  1725    sodium chloride (PF) 0.9% PF flush 3 mL  3 mL Intracatheter Q8H Rico Lemus P, DO   3 mL at 05/14/24 0549    sulfamethoxazole-trimethoprim (BACTRIM DS) 800-160 MG per tablet 1 tablet  1 tablet Oral Daily Lubna Mejias MD   1 tablet at 05/14/24 0843     Current Facility-Administered Medications   Medication Dose Route Frequency Provider Last Rate Last Admin    - MEDICATION INSTRUCTIONS -   Does not apply DOES NOT GO TO Rico Herring P, DO        acetaminophen (TYLENOL) tablet 650 mg  650 mg Oral Q4H PRN Rico Lemus P, DO        Or    acetaminophen (TYLENOL) Suppository 650 mg  650 mg Rectal Q4H PRN Rico Lemus P, DO        Continuing ACE inhibitor/ARB/ARNI from home medication list OR ACE inhibitor/ARB/ARNI order already placed during this visit   Does not apply DOES NOT GO TO Rico Herring,         Continuing beta blocker from home medication list OR beta blocker order already placed during this visit   Does not apply DOES NOT GO TO Rico Herring,         lidocaine (LMX4) cream   Topical Q1H PRN Rico Lemus P, DO        lidocaine 1 % 0.1-1 mL  0.1-1 mL Other Q1H PRN Rico Lemus P, DO        melatonin tablet 1 mg  1 mg Oral At Bedtime PRN Rico Lemus P, DO        ondansetron (ZOFRAN ODT) ODT tab 4 mg  4 mg Oral Q6H PRN Rico Lemus P, DO        Or    ondansetron (ZOFRAN) injection 4 mg  4 mg Intravenous Q6H PRN Rico Lemus P, DO        Patient is already receiving anticoagulation with heparin, enoxaparin (LOVENOX), warfarin (COUMADIN)  or other anticoagulant medication   Does not apply Continuous PRN Trae Lemusn P, DO        senna-docusate (SENOKOT-S/PERICOLACE) 8.6-50 MG per tablet 1 tablet  1 tablet Oral BID PRN Trae Lemusn P, DO   1 tablet at 05/14/24 0547    Or    senna-docusate (SENOKOT-S/PERICOLACE) 8.6-50 MG per tablet 2 tablet  2 tablet Oral BID PRN Trae Lemusn P, DO   2 tablet at 05/12/24 1350    sodium chloride (PF) 0.9% PF flush 10 mL  10 mL  Intravenous Q10 Min PRN Rocky Marinelli MD        sodium chloride (PF) 0.9% PF flush 3 mL  3 mL Intracatheter q1 min prn Rico Lemus DO   3 mL at 05/14/24 0912    sodium chloride (PF) 0.9% PF flush 5-10 mL  5-10 mL Intravenous q1 min prn Rocky Marinelli MD         Allergies:     Allergies   Allergen Reactions    Amiodarone Shortness Of Breath     Suspected amiodarone toxicity involving lungs     Physical Exam:    Temp:  [97.1  F (36.2  C)-98.3  F (36.8  C)] 97.3  F (36.3  C)  Pulse:  [50-67] (P) 52  Resp:  [14-20] (P) 18  BP: (111-138)/(56-77) 123/66  SpO2:  [92 %-95 %] (P) 95 %      Intake/Output Summary (Last 24 hours) at 5/10/2024 1925  Last data filed at 5/10/2024 1552  Gross per 24 hour   Intake 755 ml   Output 3580 ml   Net -2825 ml     General: Sitting up at side of bed in NAD  HEENT: anicteric, moist mucosa  Chest: CTAB, some crackles posteriorly, no wheezing  Cardiac: RRR no murmurs  Abdomen: Soft, flat, non tender, active BS  Extremities: No LE Edema  Neuro: A&Ox3, no focal deficits   Skin: no rash noted    Laboratory, imaging, and microbiologic data:    All laboratory and imaging data reviewed, pertinent results discussed above.

## 2024-05-14 NOTE — PROGRESS NOTES
Nursing:  Patient has been assessed for Home Oxygen needs. Oxygen readings:  *Pulse oximetry (SpO2) = 95% on room air at rest in chair while awake..  But has DELEON with activity  *SpO2 = 87% with DELEON on 3LPM during  activity/with exercise with RT.  *SpO2 improved to 93% on RA at rest in chair         Tere Brady RN OCN

## 2024-05-14 NOTE — PLAN OF CARE
[id number]    Orientation: A&Ox4  Activity: independent in room   Diet/BS Checks: 2 gram NA, 1800 mL FR.   Tele:  sinus bradycardia  IV Access/Drains: R PIV SL.   Pain Management: denied pain  Abnormal VS/Results: VSS on 1L NC O2, except bradycardia   Bowel/Bladder: continent B/B. No BM this shift. Utilized urinal.   Skin/Wounds: R shin scab. Sam BLE.   Consults: cardiology, pulmonology.   D/C Disposition: pending    Other Info:   A&Ox4. VSS on 1L NC O2, except bradycardia. Refused CPAP overnight. Independent in room. Continent B/B. No BM this shift. PRN senna given x1 this AM per pt request. 2 Gm NA diet with 1800 mL FR. R PIV SL. On tele, sinus bradycardia. K replacement protocol.  Discharge pending.

## 2024-05-14 NOTE — PLAN OF CARE
"Goal Outcome Evaluation:      Plan of Care Reviewed With: patient    Overall Patient Progress: no changeOverall Patient Progress: no change    Shift Note [id number]  Orientation/Neuro: A&Ox4  CV: Tele - SR w/ occasional PVCs. VSS.  VS: /66 (BP Location: Left arm)   Pulse 71   Temp 98.1  F (36.7  C) (Oral)   Resp 20   Ht 1.727 m (5' 8\")   Wt 126.9 kg (279 lb 12.8 oz)   SpO2 90%   BMI 42.54 kg/m     Respiratory:   GI/: Continent - urinal @ bedside  Skin: R shin scab, karine BLE  Activity: Up independently in room, SBA in halls  Diet: 2G Na, no caffeine, 1800 mL FR  IV: PIV, SL  BG: n/a  Labs/Tests: K+ 3.5  Pain: Denies  Other:  Consults: Cardiology, Pulmonology following, Cardiac Rehab, OT  Plan: Discharge TBD      "

## 2024-05-14 NOTE — PROGRESS NOTES
([id number])  Pt A&O x4, up indep in room. VSS, on O2 1L. Denied pain. Denied SOB. 1800 ml fluid restriction. 400 ml intake this shift.

## 2024-05-15 ENCOUNTER — APPOINTMENT (OUTPATIENT)
Dept: OCCUPATIONAL THERAPY | Facility: CLINIC | Age: 74
DRG: 273 | End: 2024-05-15
Payer: COMMERCIAL

## 2024-05-15 LAB
ALDOLASE SERPL-CCNC: 5.4 U/L
ANION GAP SERPL CALCULATED.3IONS-SCNC: 13 MMOL/L (ref 7–15)
BUN SERPL-MCNC: 39.4 MG/DL (ref 8–23)
CALCIUM SERPL-MCNC: 9.3 MG/DL (ref 8.8–10.2)
CHLORIDE SERPL-SCNC: 94 MMOL/L (ref 98–107)
CREAT SERPL-MCNC: 1.32 MG/DL (ref 0.67–1.17)
DEPRECATED HCO3 PLAS-SCNC: 27 MMOL/L (ref 22–29)
EGFRCR SERPLBLD CKD-EPI 2021: 57 ML/MIN/1.73M2
ERYTHROCYTE [DISTWIDTH] IN BLOOD BY AUTOMATED COUNT: 13.7 % (ref 10–15)
FASTING STATUS PATIENT QL REPORTED: NO
GLUCOSE SERPL-MCNC: 133 MG/DL (ref 70–99)
HCT VFR BLD AUTO: 46.2 % (ref 40–53)
HGB BLD-MCNC: 15.2 G/DL (ref 13.3–17.7)
MCH RBC QN AUTO: 32.3 PG (ref 26.5–33)
MCHC RBC AUTO-ENTMCNC: 32.9 G/DL (ref 31.5–36.5)
MCV RBC AUTO: 98 FL (ref 78–100)
PLATELET # BLD AUTO: 420 10E3/UL (ref 150–450)
POTASSIUM SERPL-SCNC: 3.1 MMOL/L (ref 3.4–5.3)
POTASSIUM SERPL-SCNC: 3.8 MMOL/L (ref 3.4–5.3)
RBC # BLD AUTO: 4.71 10E6/UL (ref 4.4–5.9)
SODIUM SERPL-SCNC: 134 MMOL/L (ref 135–145)
WBC # BLD AUTO: 14.9 10E3/UL (ref 4–11)

## 2024-05-15 PROCEDURE — 36415 COLL VENOUS BLD VENIPUNCTURE: CPT | Performed by: INTERNAL MEDICINE

## 2024-05-15 PROCEDURE — 97535 SELF CARE MNGMENT TRAINING: CPT | Mod: GO

## 2024-05-15 PROCEDURE — 250N000012 HC RX MED GY IP 250 OP 636 PS 637: Performed by: INTERNAL MEDICINE

## 2024-05-15 PROCEDURE — 84132 ASSAY OF SERUM POTASSIUM: CPT | Performed by: INTERNAL MEDICINE

## 2024-05-15 PROCEDURE — 80048 BASIC METABOLIC PNL TOTAL CA: CPT | Performed by: HOSPITALIST

## 2024-05-15 PROCEDURE — 99233 SBSQ HOSP IP/OBS HIGH 50: CPT | Performed by: INTERNAL MEDICINE

## 2024-05-15 PROCEDURE — 250N000013 HC RX MED GY IP 250 OP 250 PS 637: Performed by: INTERNAL MEDICINE

## 2024-05-15 PROCEDURE — 250N000013 HC RX MED GY IP 250 OP 250 PS 637: Performed by: STUDENT IN AN ORGANIZED HEALTH CARE EDUCATION/TRAINING PROGRAM

## 2024-05-15 PROCEDURE — 36415 COLL VENOUS BLD VENIPUNCTURE: CPT | Performed by: HOSPITALIST

## 2024-05-15 PROCEDURE — 250N000011 HC RX IP 250 OP 636: Performed by: INTERNAL MEDICINE

## 2024-05-15 PROCEDURE — 120N000001 HC R&B MED SURG/OB

## 2024-05-15 PROCEDURE — 85041 AUTOMATED RBC COUNT: CPT | Performed by: HOSPITALIST

## 2024-05-15 RX ORDER — POTASSIUM CHLORIDE 1500 MG/1
40 TABLET, EXTENDED RELEASE ORAL ONCE
Status: COMPLETED | OUTPATIENT
Start: 2024-05-15 | End: 2024-05-15

## 2024-05-15 RX ADMIN — RIVAROXABAN 20 MG: 20 TABLET, FILM COATED ORAL at 18:10

## 2024-05-15 RX ADMIN — PREDNISONE 40 MG: 20 TABLET ORAL at 08:32

## 2024-05-15 RX ADMIN — FUROSEMIDE 60 MG: 10 INJECTION, SOLUTION INTRAMUSCULAR; INTRAVENOUS at 08:34

## 2024-05-15 RX ADMIN — SULFAMETHOXAZOLE AND TRIMETHOPRIM 1 TABLET: 800; 160 TABLET ORAL at 08:33

## 2024-05-15 RX ADMIN — METOLAZONE 2.5 MG: 2.5 TABLET ORAL at 08:34

## 2024-05-15 RX ADMIN — FINASTERIDE 1 MG: 1 TABLET, FILM COATED ORAL at 08:34

## 2024-05-15 RX ADMIN — POTASSIUM CHLORIDE 40 MEQ: 1500 TABLET, EXTENDED RELEASE ORAL at 11:07

## 2024-05-15 RX ADMIN — PANTOPRAZOLE SODIUM 40 MG: 40 TABLET, DELAYED RELEASE ORAL at 05:25

## 2024-05-15 RX ADMIN — METOPROLOL TARTRATE 12.5 MG: 25 TABLET, FILM COATED ORAL at 20:46

## 2024-05-15 RX ADMIN — POTASSIUM CHLORIDE 40 MEQ: 1500 TABLET, EXTENDED RELEASE ORAL at 18:10

## 2024-05-15 RX ADMIN — LOSARTAN POTASSIUM 50 MG: 25 TABLET, FILM COATED ORAL at 08:34

## 2024-05-15 RX ADMIN — ALLOPURINOL 300 MG: 300 TABLET ORAL at 08:33

## 2024-05-15 RX ADMIN — POTASSIUM CHLORIDE 40 MEQ: 1500 TABLET, EXTENDED RELEASE ORAL at 08:32

## 2024-05-15 ASSESSMENT — ACTIVITIES OF DAILY LIVING (ADL)
ADLS_ACUITY_SCORE: 20

## 2024-05-15 NOTE — PLAN OF CARE
[id number]     Orientation: A&Ox4  Activity: independent in room   Diet/BS Checks: 2 gram NA, 1800 mL FR.   Tele:  sinus bradycardia  IV Access/Drains: R PIV SL.   Pain Management: denied pain  Abnormal VS/Results: VSS on RA except bradycardia. Pt wore 1L NC O2 overnight for comfort per his request.   Bowel/Bladder: continent B/B. No BM this shift. Utilized urinal.   Skin/Wounds: R shin scab. Sam BLE.   Consults: cardiology, pulmonology.   D/C Disposition: pending     Other Info:   A&Ox4. VSS on RA, except bradycardia. Refused CPAP overnight. Independent in room. Continent B/B. No BM this shift.  2 Gm NA diet with 1800 mL FR. R PIV SL. On tele, sinus bradycardia. K replacement protocol.  Discharge pending.

## 2024-05-15 NOTE — PLAN OF CARE
Goal Outcome Evaluation:       Nursing update 0700-1900  Orientation: A&Ox4  Activity: independent in room   Diet/BS Checks: 2 gram NA, 1800 mL FR.   Tele:  NSR  IV Access/Drains: R PIV SL.   Pain Management: denied pain  Abnormal VS/Results: VSS except bradycardia. Metoprolol held due to parameters. On RA  Bowel/Bladder: continent B/B. Had BM this shift. Uses the  urinal.   Skin/Wounds: R shin scab. Sam BLE.   Consults: cardiology, pulmonology.   D/C Disposition: pending, possible discharge home tomorrow on O2  Potassium 3.1, replaced during shift. Recheck WNL,  K recheck in AM

## 2024-05-15 NOTE — PROGRESS NOTES
Oxygen Documentation  I certify that this patient, Mike Schultz has been under my care (or a nurse practitioner or physican's assistant working with me). This is the face-to-face encounter for oxygen medical necessity.      At the time of this encounter, I have reviewed the qualifying testing and have determined that supplemental oxygen is reasonable and necessary and is expected to improve the patient's condition in a home setting.         Patient has continued oxygen desaturation due to Chronic Heart Failure I50  COPD J44.9  ILD J84.9.    If portability is ordered, is the patient mobile within the home? yes    Was this visit performed as a telehealth visit: No    LISET Abarca MD  Hospitalist  [phone]

## 2024-05-15 NOTE — PROGRESS NOTES
[facility] [place]    Medicine Progress Note - Hospitalist Service       Date of Admission:  5/5/2024    Assessment & Plan   Mike Schultz is a 73 year old male with hx of a-fib refractory to medical therapy s/p PVI in 2017 with re-isolation of reconnected PVs in 2019, recent redo ablation (5/2/24), HFpEF, aortic stenosis, hx of amiodarone pneumonitis, and BOWEN on CPAP who presented to the ED on 5/5/24 for evaluation of progressive shortness of breath, and was found to have acute hypoxic respiratory failure which has been attributed to relapse of inflammatory process and acute CHF. Cardiology and Pulmonology following this admission    Pertinent clinical history:  *11/2023: Hospitalized with acute hypoxic respiratory failure, diagnosed with amiodarone-induced lung toxicity at that time. Treated with prolonged course of steroids  *12/2023: outpatient PFTs consistent with mild restriction  *02/2024: Hospitalized with acute HFpEF in the setting of recurrent a-fib/RVR  *03/2024: Chest CT showed improvement in bilateral interstitial pulmonary infiltrates with likely component of fibrosis     *05/05/2024: Presented on this admission with progressive shortness of breath following recent ablation on 5/2. Had tried increasing his diuretics at home without improvement. Hospital course noted below      # Acute hypoxic respiratory failure, multifactorial: Improved  # Bilateral groundglass opacities of unclear etiology  # Acute on chronic HFpEF  # Moderate aortic stenosis  # Hx of amiodarone pneumonitis  *NTpBNP >1200 in the ED. CXR showed diffuse indistinctness of the pulmonary interstitium with question of inflammation vs edema; concern for patchy opacity involving the retrocardiac region of the lung base and lateral aspect of right middle lobe concerning for infection   *Pt has been afebrile with normal WBC and procal on admission. Resp viral panel and COVID-19 negative.  *Initiated on IV Lasix on  admission. Cardiology consulted.  *5/6 TTE: EF 55%, mild LVH, moderate aortic stenosis - MG 35mmHg; grade 3/advanced diastolic dysfunction.   *5/7: Started on metolazone per Cardiology  *5/8 Repeat CXR favoring pulmonary edema   *5/8 Chest CT (5/8) showed extensive bilateral groundglass opacities with reactive lymph nodes  *5/9: Pulm consulted; felt presentation with consistent with relapse of inflammatory lung process. CRP elevated >100; steroids initiated with improvement. Bactrim also initiated for PJP ppx.   - Now weaned to room air at rest, but desats to mid 80s on room air  - Cont Lasix 60 mg IV daily  - Cont metolazone 2.5 mg daily  - On 1800 mL fluid restriction  - Cont PTA KCl 40 meq BID while on diuretics  - On prednisone 40 mg daily with prolonged taper as per Pulm  - Cont Bactrim for PJP ppx while on steroids  - ILD panel ordered by Pulm  - Pulm will arrange outpatient follow up in 2-3 months with repeat CT and PFTs  - Cardiology recommending right and left heart cath after discharge once back to baseline resp status  - Will order OP cardiac rehab referral at discharge     Elevated troponin, suspect demand ischemia/NSTEMI type 2  Abnormal stress test  *Initial troponin 90 with flat trend (85). EKG non-ischemic. ACS felt unlikely per Cardiology  *TTE this admission showed preserved EF, but difficult to assess for WMA  *Nuclear stress test (5/9) showed small area of ischemia; per Cardiology could be false positive due to body habitus  - Cardiology recommending right and left heart cath after discharge once back to baseline resp status    Recurrent atrial fibrillation s/p DCCV and amiodarone with recent redo ablation on 5/2/24 with Dr. Day   Sinus bradycardia  *Home regimen: metoprolol 25 mg BID, rivaroxaban  *Bradycardia to 40s-50s noted this admission  - Decrease metoprolol from 25 to 12.5 mg BID     Hypertension  *Home regimen: metoprolol 25 mg BID, losartan 50 mg daily, torsemide 80 mg qAM, 40 mg  qPM  - Metoprolol dose decreased for bradycardia as noted above  - Holding torsemide while on IV Lasix  - Cont losartan       Prolonged QT interval  *Has chronically elevated QTc in 500s range     Obstructive sleep apnea  *Chronic and stable on CPAP    Gout  *Chronic and stable on allopurinol    GERD  *Chronic and stable on PPI    BPH  *Chronic and stable on finasteride     Diet: Combination Diet 2 gm NA Diet; No Caffeine Diet (and additional linked orders)  Fluid restriction 1800 ML FLUID (and additional linked orders)    DVT Prophylaxis: DOAC  Palmer Catheter: Not present  Code Status: Full Code         Disposition: Expected discharge home tomorrow with home O2. Will do one more day of IV Lasix today and tomorrow before discharge    The patient's care was discussed with the Bedside Nurse and Patient.    Daiana Abarca MD  Hospitalist Service  [facility]  Contact information available via [facility] Paging/Directory    ______________________________________________________________________    Interval History   No acute events overnight. Now weaned off O2 at rest, but desats to mid 80s with ambulation. Discussed anticipated discharge tomorrow with possible ambulatory O2 for activity, and was agreeable to this    Data reviewed today: I reviewed all medications, new labs and imaging results over the last 24 hours. I personally reviewed no images or EKG's today.    Physical Exam   Vital Signs: Temp: 97.4  F (36.3  C) Temp src: Oral BP: 121/65 Pulse: 50   Resp: 16 SpO2: 92 % O2 Device: None (Room air) Oxygen Delivery: 1 LPM  Weight: 279 lbs 12.8 oz  Constitutional: Resting comfortably, NAD  HEENT: Sclera white, MMM  Respiratory: Breathing non-labored. Lungs CTAB - no wheezes, crackles, or rhonchi  Cardiovascular: Heart RRR, +systolic murmur. Mild BLE edema  GI: +BS, abd soft/NT  Skin/Integument: No rash  Musculoskeletal: Normal muscle bulk and tone  Neuro: Alert and appropriate, BUTLER  Psych:  [facility]    Data   Recent Labs   Lab 05/14/24  0936 05/14/24  0935 05/13/24  1639 05/13/24  0943 05/12/24  0913   WBC 16.7*  --   --  14.8* 12.5*   HGB 14.9  --   --  14.6 14.1   MCV 97  --   --  98 101*     --   --  389 374   NA  --  138  --  137 140   POTASSIUM  --  3.5 3.8 3.2* 3.5   CHLORIDE  --  96*  --  97* 97*   CO2  --  26  --  27 32*   BUN  --  34.6*  --  35.0* 38.2*   CR  --  1.17  --  1.14 1.19*   ANIONGAP  --  16*  --  13 11   ASHELY  --  9.8  --  9.5 9.4   GLC  --  125*  --  141* 103*         No results found for this or any previous visit (from the past 24 hour(s)).    Medications   Current Facility-Administered Medications   Medication Dose Route Frequency Provider Last Rate Last Admin    - MEDICATION INSTRUCTIONS -   Does not apply DOES NOT GO TO Rico Herring DO        Continuing ACE inhibitor/ARB/ARNI from home medication list OR ACE inhibitor/ARB/ARNI order already placed during this visit   Does not apply DOES NOT GO TO Rico Herring DO        Continuing beta blocker from home medication list OR beta blocker order already placed during this visit   Does not apply DOES NOT GO TO Rico Herring DO        Patient is already receiving anticoagulation with heparin, enoxaparin (LOVENOX), warfarin (COUMADIN)  or other anticoagulant medication   Does not apply Continuous PRN Rico Lemus DO         Current Facility-Administered Medications   Medication Dose Route Frequency Provider Last Rate Last Admin    allopurinol (ZYLOPRIM) tablet 300 mg  300 mg Oral Daily Rico Lemus DO   300 mg at 05/15/24 0833    finasteride (PROPECIA) tablet 1 mg  1 mg Oral Daily Rico Lemus DO   1 mg at 05/15/24 0834    furosemide (LASIX) injection 60 mg  60 mg Intravenous Daily Daiana Abarca MD   60 mg at 05/15/24 0834    losartan (COZAAR) tablet 50 mg  50 mg Oral Daily Rico Lemus DO   50 mg at 05/15/24 0834    metolazone (ZAROXOLYN) tablet 2.5 mg  2.5 mg Oral  Daily Anastacio Morrissey MD   2.5 mg at 05/15/24 0834    metoprolol tartrate (LOPRESSOR) half-tab 12.5 mg  12.5 mg Oral BID Daiana Abarca MD        pantoprazole (PROTONIX) EC tablet 40 mg  40 mg Oral QAM AC Rico Lemus, DO   40 mg at 05/15/24 0525    potassium chloride pushpa ER (KLOR-CON M20) CR tablet 40 mEq  40 mEq Oral BID Rico Lemus, DO   40 mEq at 05/15/24 0832    predniSONE (DELTASONE) tablet 40 mg  40 mg Oral Daily Vinayak Valles MD   40 mg at 05/15/24 0832    Followed by    [START ON 5/27/2024] predniSONE (DELTASONE) tablet 20 mg  20 mg Oral Daily Vinayak Valles MD        Followed by    [START ON 6/26/2024] predniSONE (DELTASONE) tablet 10 mg  10 mg Oral Daily Vinayak Valles MD        Followed by    [START ON 8/10/2024] predniSONE (DELTASONE) tablet 5 mg  5 mg Oral Daily Vinayak Valles MD        rivaroxaban ANTICOAGULANT (XARELTO) tablet 20 mg  20 mg Oral Daily with supper Rico Lemus, DO   20 mg at 05/14/24 1704    sodium chloride (PF) 0.9% PF flush 3 mL  3 mL Intracatheter Q8H Rico Lemus DO   3 mL at 05/15/24 0526    sulfamethoxazole-trimethoprim (BACTRIM DS) 800-160 MG per tablet 1 tablet  1 tablet Oral Daily Lubna Mejias MD   1 tablet at 05/15/24 0855

## 2024-05-15 NOTE — PROVIDER NOTIFICATION
MD Notification    Notified Person: MD hospitalist     Notified Person Name: Dr. Mitchell    Notification Date/Time: 5/14/2024 at 2100     Notification Interaction: RE2 web messaging     Purpose of Notification:   [address] Pt has scheduled 25 mg metoprolol tonight. It is noted in the hospitalist note to decrease metoprolol dose to 12.5 mg BID d/t bradycardia. Pt was bradycardic last night and the metoprolol dose was held this morning d/t bradycardia. Currently HR 72 and /68    Orders Received: give medication as scheduled.     Comments:

## 2024-05-15 NOTE — PROGRESS NOTES
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     Acute Hypoxic Respiratory Failure  Bilateral Ground Glass Opacities  Hx Amiodarone Pneumonitis  BOWEN on CPAP  Recent ablation for Afib with continued dyspnea and ultimately oxygen requirement. 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 At that time infectious including fungal workup negative. Limited initial response to 40 mg daily of prednisone but improvement on 60 mg and underwent several months long taper with greatly improved imaging March 2024. Currently viral testing negative, normal WBC, procal negative, no significant cough or fever and believe infection less likely. Has been diuresed effectively without any impact on symptoms. CRP and ESR both elevated. No imaging since March so unknown timeline for development of infiltrates. Believe relapse of inflammatory process is most likely,       T-patient had significant improvement in symptoms and oxygen status after starting prednisone.  - I have ordered prednisone taper.  -Also placed him on bactrim.   - I will set this patient up to be seen by me in the next 2 to 3 months with a CT scan and PFTs.  -Ok to discharge home from pulmonary perspective.   - I have ordered extensive ILD panel to look for possible underlying interstitial lung disease.  Because it does not make sense that patient has been off of amiodarone for 6 months and he  should get another amiodarone associated lung toxicity now.    Vinayak Valles MD   Pulmonary and Critical Care     Interval History:    Sarted on prednisone yesterday, no acute events overnight. Feeling about the same today. Asked how long it took to feel better when went on steroids for amiodarone toxicity last year and thinks it took about one week.     Review of Systems:  Complete 12 point ROS negative unless mentioned in HPI    Medications, Allergies:    Medications:  Current Facility-Administered Medications   Medication Dose Route Frequency Provider Last Rate Last Admin    allopurinol (ZYLOPRIM) tablet 300 mg  300 mg Oral Daily Rico Lemus, DO   300 mg at 05/15/24 0833    finasteride (PROPECIA) tablet 1 mg  1 mg Oral Daily Rico Lemus, DO   1 mg at 05/15/24 0834    furosemide (LASIX) injection 60 mg  60 mg Intravenous Daily Daiana Abarca MD   60 mg at 05/15/24 0834    losartan (COZAAR) tablet 50 mg  50 mg Oral Daily Rico Lemus, DO   50 mg at 05/15/24 0834    metolazone (ZAROXOLYN) tablet 2.5 mg  2.5 mg Oral Daily Anastacio Morrissey MD   2.5 mg at 05/15/24 0834    metoprolol tartrate (LOPRESSOR) half-tab 12.5 mg  12.5 mg Oral BID Daiana Abarca MD        pantoprazole (PROTONIX) EC tablet 40 mg  40 mg Oral QAM AC Rico Lemus, DO   40 mg at 05/15/24 0525    potassium chloride pushpa ER (KLOR-CON M20) CR tablet 40 mEq  40 mEq Oral BID Rico Lemus, DO   40 mEq at 05/15/24 0832    predniSONE (DELTASONE) tablet 40 mg  40 mg Oral Daily Vinayak Valles MD   40 mg at 05/15/24 0832    Followed by    [START ON 5/27/2024] predniSONE (DELTASONE) tablet 20 mg  20 mg Oral Daily Vinayak Valles MD        Followed by    [START ON 6/26/2024] predniSONE (DELTASONE) tablet 10 mg  10 mg Oral Daily Vianyak Valles MD        Followed by    [START ON 8/10/2024] predniSONE (DELTASONE) tablet 5 mg  5 mg Oral Daily Vinayak Valles MD        rivaroxaban ANTICOAGULANT (XARELTO) tablet 20 mg  20 mg Oral Daily with supper  Rico Lemus P, DO   20 mg at 05/14/24 1704    sodium chloride (PF) 0.9% PF flush 3 mL  3 mL Intracatheter Q8H Rico Lemus P, DO   3 mL at 05/15/24 1354    sulfamethoxazole-trimethoprim (BACTRIM DS) 800-160 MG per tablet 1 tablet  1 tablet Oral Daily Lubna Mejias MD   1 tablet at 05/15/24 0833     Current Facility-Administered Medications   Medication Dose Route Frequency Provider Last Rate Last Admin    - MEDICATION INSTRUCTIONS -   Does not apply DOES NOT GO TO Rico Herring,         acetaminophen (TYLENOL) tablet 650 mg  650 mg Oral Q4H PRN Rico Lemus P, DO        Or    acetaminophen (TYLENOL) Suppository 650 mg  650 mg Rectal Q4H PRN Rico Lemus P, DO        Continuing ACE inhibitor/ARB/ARNI from home medication list OR ACE inhibitor/ARB/ARNI order already placed during this visit   Does not apply DOES NOT GO TO Rico Herring,         Continuing beta blocker from home medication list OR beta blocker order already placed during this visit   Does not apply DOES NOT GO TO Rico Herring,         lidocaine (LMX4) cream   Topical Q1H PRN Rico Lemus,         lidocaine 1 % 0.1-1 mL  0.1-1 mL Other Q1H PRN Rico Lemus P, DO        melatonin tablet 1 mg  1 mg Oral At Bedtime PRN Rico Lemus P, DO        ondansetron (ZOFRAN ODT) ODT tab 4 mg  4 mg Oral Q6H PRN Rico Lemus,         Or    ondansetron (ZOFRAN) injection 4 mg  4 mg Intravenous Q6H PRN Rico Lemus, DO        Patient is already receiving anticoagulation with heparin, enoxaparin (LOVENOX), warfarin (COUMADIN)  or other anticoagulant medication   Does not apply Continuous PRN Rico Lemus P, DO        senna-docusate (SENOKOT-S/PERICOLACE) 8.6-50 MG per tablet 1 tablet  1 tablet Oral BID PRN Trae Lemusn P, DO   1 tablet at 05/14/24 0547    Or    senna-docusate (SENOKOT-S/PERICOLACE) 8.6-50 MG per tablet 2 tablet  2 tablet Oral BID PRN Rico Lemus P, DO   2 tablet at 05/12/24 1350    sodium  chloride (PF) 0.9% PF flush 10 mL  10 mL Intravenous Q10 Min PRN Rocky Marinelli MD        sodium chloride (PF) 0.9% PF flush 3 mL  3 mL Intracatheter q1 min prn Rico Lemus DO   3 mL at 05/14/24 0912    sodium chloride (PF) 0.9% PF flush 5-10 mL  5-10 mL Intravenous q1 min prn Rocky Marinelli MD         Allergies:     Allergies   Allergen Reactions    Amiodarone Shortness Of Breath     Suspected amiodarone toxicity involving lungs     Physical Exam:    Temp:  [97.4  F (36.3  C)-98.1  F (36.7  C)] 97.4  F (36.3  C)  Pulse:  [50-72] 63  Resp:  [14-20] 15  BP: ()/(58-72) 98/58  SpO2:  [90 %-94 %] 92 %      Intake/Output Summary (Last 24 hours) at 5/10/2024 1925  Last data filed at 5/10/2024 1552  Gross per 24 hour   Intake 755 ml   Output 3580 ml   Net -2825 ml     General: Sitting up at side of bed in NAD  HEENT: anicteric, moist mucosa  Chest: CTAB, some crackles posteriorly, no wheezing  Cardiac: RRR no murmurs  Abdomen: Soft, flat, non tender, active BS  Extremities: No LE Edema  Neuro: A&Ox3, no focal deficits   Skin: no rash noted    Laboratory, imaging, and microbiologic data:    All laboratory and imaging data reviewed, pertinent results discussed above.

## 2024-05-15 NOTE — PROGRESS NOTES
Occupational Therapy/Cardiac Rehab Discharge Summary    Reason for therapy discharge:    All goals and outcomes met, no further needs identified.    Progress towards therapy goal(s). See goals on Care Plan in [facility] electronic health record for goal details.  Goals met    Therapy recommendation(s):    Continued therapy is recommended.  Rationale/Recommendations:  pet has met all cardiac Phase 1 goals and goals for safe DC to home. good awareness of O2 needs with activity, demo'd safe ability to use O2 with activity and able to demo monitoring with pulse ox. pt will be able to discharge home with outpatient cardiac rehab to progress ADL/IADL and exercise tolerance; lifestyle education/mgmt..  Continue home exercise program.    Amb in hallway while IP 3-4x/day

## 2024-05-16 ENCOUNTER — TELEPHONE (OUTPATIENT)
Dept: INTERNAL MEDICINE | Facility: CLINIC | Age: 74
End: 2024-05-16
Payer: COMMERCIAL

## 2024-05-16 VITALS
HEIGHT: 68 IN | TEMPERATURE: 97.2 F | SYSTOLIC BLOOD PRESSURE: 117 MMHG | WEIGHT: 278.1 LBS | RESPIRATION RATE: 20 BRPM | DIASTOLIC BLOOD PRESSURE: 73 MMHG | OXYGEN SATURATION: 93 % | BODY MASS INDEX: 42.15 KG/M2 | HEART RATE: 51 BPM

## 2024-05-16 LAB
ANION GAP SERPL CALCULATED.3IONS-SCNC: 11 MMOL/L (ref 7–15)
BUN SERPL-MCNC: 37.1 MG/DL (ref 8–23)
CALCIUM SERPL-MCNC: 9.1 MG/DL (ref 8.8–10.2)
CHLORIDE SERPL-SCNC: 96 MMOL/L (ref 98–107)
CREAT SERPL-MCNC: 1.2 MG/DL (ref 0.67–1.17)
DEPRECATED HCO3 PLAS-SCNC: 27 MMOL/L (ref 22–29)
EGFRCR SERPLBLD CKD-EPI 2021: 64 ML/MIN/1.73M2
ERYTHROCYTE [DISTWIDTH] IN BLOOD BY AUTOMATED COUNT: 13.9 % (ref 10–15)
FASTING STATUS PATIENT QL REPORTED: NO
GLUCOSE SERPL-MCNC: 110 MG/DL (ref 70–99)
HCT VFR BLD AUTO: 45.6 % (ref 40–53)
HGB BLD-MCNC: 15 G/DL (ref 13.3–17.7)
MCH RBC QN AUTO: 32.3 PG (ref 26.5–33)
MCHC RBC AUTO-ENTMCNC: 32.9 G/DL (ref 31.5–36.5)
MCV RBC AUTO: 98 FL (ref 78–100)
PLATELET # BLD AUTO: 372 10E3/UL (ref 150–450)
POTASSIUM SERPL-SCNC: 3.4 MMOL/L (ref 3.4–5.3)
RBC # BLD AUTO: 4.65 10E6/UL (ref 4.4–5.9)
SODIUM SERPL-SCNC: 134 MMOL/L (ref 135–145)
WBC # BLD AUTO: 16.4 10E3/UL (ref 4–11)

## 2024-05-16 PROCEDURE — 250N000013 HC RX MED GY IP 250 OP 250 PS 637: Performed by: STUDENT IN AN ORGANIZED HEALTH CARE EDUCATION/TRAINING PROGRAM

## 2024-05-16 PROCEDURE — 85027 COMPLETE CBC AUTOMATED: CPT | Performed by: HOSPITALIST

## 2024-05-16 PROCEDURE — 80048 BASIC METABOLIC PNL TOTAL CA: CPT | Performed by: HOSPITALIST

## 2024-05-16 PROCEDURE — 250N000012 HC RX MED GY IP 250 OP 636 PS 637: Performed by: INTERNAL MEDICINE

## 2024-05-16 PROCEDURE — 36415 COLL VENOUS BLD VENIPUNCTURE: CPT | Performed by: HOSPITALIST

## 2024-05-16 PROCEDURE — 99239 HOSP IP/OBS DSCHRG MGMT >30: CPT | Performed by: INTERNAL MEDICINE

## 2024-05-16 PROCEDURE — 250N000013 HC RX MED GY IP 250 OP 250 PS 637: Performed by: INTERNAL MEDICINE

## 2024-05-16 RX ORDER — PREDNISONE 5 MG/1
TABLET ORAL
Qty: 350 TABLET | Refills: 0 | Status: ON HOLD | OUTPATIENT
Start: 2024-05-16 | End: 2024-08-09

## 2024-05-16 RX ORDER — POTASSIUM CHLORIDE 1500 MG/1
40 TABLET, EXTENDED RELEASE ORAL ONCE
Status: COMPLETED | OUTPATIENT
Start: 2024-05-16 | End: 2024-05-16

## 2024-05-16 RX ORDER — METOLAZONE 2.5 MG/1
2.5 TABLET ORAL DAILY
Qty: 30 TABLET | Refills: 0 | Status: SHIPPED | OUTPATIENT
Start: 2024-05-16 | End: 2024-05-28

## 2024-05-16 RX ORDER — PANTOPRAZOLE SODIUM 40 MG/1
40 TABLET, DELAYED RELEASE ORAL
Qty: 30 TABLET | Refills: 3 | Status: SHIPPED | OUTPATIENT
Start: 2024-05-17 | End: 2024-05-16

## 2024-05-16 RX ORDER — METOPROLOL TARTRATE 25 MG/1
12.5 TABLET, FILM COATED ORAL 2 TIMES DAILY
Qty: 60 TABLET | Refills: 0 | Status: SHIPPED | OUTPATIENT
Start: 2024-05-16 | End: 2024-06-05

## 2024-05-16 RX ORDER — SULFAMETHOXAZOLE/TRIMETHOPRIM 800-160 MG
1 TABLET ORAL DAILY
Qty: 30 TABLET | Refills: 3 | Status: ON HOLD | OUTPATIENT
Start: 2024-05-16 | End: 2024-08-09

## 2024-05-16 RX ADMIN — ALLOPURINOL 300 MG: 300 TABLET ORAL at 08:37

## 2024-05-16 RX ADMIN — PREDNISONE 40 MG: 20 TABLET ORAL at 08:37

## 2024-05-16 RX ADMIN — LOSARTAN POTASSIUM 50 MG: 25 TABLET, FILM COATED ORAL at 08:37

## 2024-05-16 RX ADMIN — FINASTERIDE 1 MG: 1 TABLET, FILM COATED ORAL at 08:36

## 2024-05-16 RX ADMIN — METOLAZONE 2.5 MG: 2.5 TABLET ORAL at 08:37

## 2024-05-16 RX ADMIN — SULFAMETHOXAZOLE AND TRIMETHOPRIM 1 TABLET: 800; 160 TABLET ORAL at 08:36

## 2024-05-16 RX ADMIN — PANTOPRAZOLE SODIUM 40 MG: 40 TABLET, DELAYED RELEASE ORAL at 06:18

## 2024-05-16 RX ADMIN — POTASSIUM CHLORIDE 40 MEQ: 1500 TABLET, EXTENDED RELEASE ORAL at 12:53

## 2024-05-16 RX ADMIN — POTASSIUM CHLORIDE 40 MEQ: 1500 TABLET, EXTENDED RELEASE ORAL at 08:36

## 2024-05-16 ASSESSMENT — ACTIVITIES OF DAILY LIVING (ADL)
ADLS_ACUITY_SCORE: 20

## 2024-05-16 NOTE — PROGRESS NOTES
Care Management Discharge Note    Discharge Date: 05/16/2024       Discharge Disposition: Home    Discharge Services: None    Discharge DME: Oxygen (may need it continuous)    Discharge Transportation: family or friend will provide    Private pay costs discussed: Not applicable    Does the patient's insurance plan have a 3 day qualifying hospital stay waiver?  No    PAS Confirmation Code:    Patient/family educated on Medicare website which has current facility and service quality ratings:      Education Provided on the Discharge Plan:    Persons Notified of Discharge Plans: Pt/bedside RN  Patient/Family in Agreement with the Plan: yes    Handoff Referral Completed: Yes    Additional Information:  Pt discharge today to home.  MD wants Follow-up made with PCP.   Met with patient and he prefers morning appointment, okay with any provider.   CC called and scheduling shows no availability till 5/28. Directed to clinic staff (Kirstin) who sees availability 5/20,5/21 but can't schedule.  She will reach out to MD's to schedule.  They will call patient to let him know.  Lab orders are in as well.  Information added to AVS    Pt voices no other needs for discharge.   Bedside RN to review AVS.     Abbey Hung, RN

## 2024-05-16 NOTE — PLAN OF CARE
5/15/2024-5/16/2024 [id number]  Orientation: A/Ox4  Activity: Ind, refused mobility aid and alarms, oriented to ability  Diet/BS Checks: 2 gm Na diet, no caffeine. 1800 ml FR  Tele:  Sinus tom  IV Access/Drains: R PIV SL  Pain Management: Denies pain and nausea  Abnormal VS/Results: VSS ex tom at times on RA. Used 1L O2 overnight for comfort.  Bowel/Bladder: Cont B/B, no BM this shift. Uses urinal  Skin/Wounds: R shin scab. BLE karine.  Consults: Pulm and Cardio  D/C Disposition: Discharge plan pending, possibly home 5/16 w/ O2  Other Info:   - Refused CPAP overnight  - On K+ replacement protocol, recheck in AM

## 2024-05-16 NOTE — TELEPHONE ENCOUNTER
[facility] called requesting pt be seen for a hospital follow up either on Monday 5/20 or Tuesday 5/21. Pt was hospitalized for a CHF exacerbation. Pt will also need a BMP done at the follow up appt.     Routing to provider pool for approval of when we can fit pt in to be seen.     Please call pt back with providers recommendations.      Pts # [phone]

## 2024-05-16 NOTE — DISCHARGE SUMMARY
[facility]  Hospitalist Discharge Summary      Date of Admission:  5/5/2024  Date of Discharge:  5/16/2024  Discharging Provider: Daiana Abarca MD    Discharge Diagnoses   Acute hypoxic respiratory failure, multifactorial: Improved  Bilateral groundglass opacities of unclear etiology  Acute on chronic HFpEF  Moderate aortic stenosis  Hx of amiodarone pneumonitis  Elevated troponin, suspect demand ischemia/NSTEMI type 2  Abnormal stress test  Recurrent atrial fibrillation s/p DCCV and amiodarone with recent redo ablation on 5/2/24 with Dr. Day   Sinus bradycardia  Hypertension   Prolonged QT interval  Obstructive sleep apnea  Gout  GERD  BPH    Follow-ups Needed After Discharge   Follow-up Appointments     Follow-up and recommended labs and tests       Follow up with primary care provider/Clinic for hospital follow- up.  The   following labs/tests are recommended: basic metabolic panel-- CLINIC WILL   CALL YOU WITH DATE/TIME (possibly Monday). It will also be in my chart.   Follow up with Cardiology at next available appointment  Follow up with Pulmonology as scheduled. They will call to arrange an   appointment          Unresulted Labs Ordered in the Past 30 Days of this Admission       Date and Time Order Name Status Description    5/13/2024  4:16 PM Hypersensitivity Pneumonitis 2 In process     5/13/2024  4:16 PM Hypersensitivity pneumonitis In process           Discharge Disposition   Discharged to home  Condition at discharge: Stable    Hospital Course   Mike Schultz is a 73 year old male with hx of a-fib refractory to medical therapy s/p PVI in 2017 with re-isolation of reconnected PVs in 2019, recent redo ablation (5/2/24), HFpEF, aortic stenosis, hx of amiodarone pneumonitis, and BOWEN on CPAP who presented to the ED on 5/5/24 for evaluation of progressive shortness of breath, and was found to have acute hypoxic respiratory failure which has been attributed to relapse of  inflammatory process and acute CHF. Cardiology and Pulmonology were consulted this admission. Pt ultimately improved with IV Lasix and steroids.    Pertinent clinical history:  *11/2023: Hospitalized with acute hypoxic respiratory failure, diagnosed with amiodarone-induced lung toxicity at that time. Treated with prolonged course of steroids  *12/2023: outpatient PFTs consistent with mild restriction  *02/2024: Hospitalized with acute HFpEF in the setting of recurrent a-fib/RVR  *03/2024: Chest CT showed improvement in bilateral interstitial pulmonary infiltrates with likely component of fibrosis     *05/05/2024: Presented on this admission with progressive shortness of breath following recent ablation on 5/2. Had tried increasing his diuretics at home without improvement. Hospital course noted below      # Acute hypoxic respiratory failure, multifactorial: Improved  # Bilateral groundglass opacities of unclear etiology  # Acute on chronic HFpEF  # Moderate aortic stenosis  # Hx of amiodarone pneumonitis  *NTpBNP >1200 in the ED. CXR showed diffuse indistinctness of the pulmonary interstitium with question of inflammation vs edema; concern for patchy opacity involving the retrocardiac region of the lung base and lateral aspect of right middle lobe concerning for infection   *Pt has been afebrile with normal WBC and procal on admission. Resp viral panel and COVID-19 negative.  *Initiated on IV Lasix on admission. Cardiology consulted.  *5/6 TTE: EF 55%, mild LVH, moderate aortic stenosis - MG 35mmHg; grade 3/advanced diastolic dysfunction.   *5/7: Started on metolazone per Cardiology  *5/8 Repeat CXR favoring pulmonary edema   *5/8 Chest CT (5/8) showed extensive bilateral groundglass opacities with reactive lymph nodes  *5/9: Pulm consulted; felt presentation with consistent with relapse of inflammatory lung process. CRP elevated >100; steroids initiated with improvement. Bactrim initiated for PJP ppx.   -- Now  weaned to room air at rest, 1-2L with activity. Home O2 ordered at discharge  -- Initiated on metolazone 2.5 mg daily this admission; continued at discharge  -- Continues on PTA torsemide 80 mg qAM, 40 mg qHS  -- Conts on PTA KCl 40 meq BID   -- On prednisone 40 mg daily with prolonged taper as per Pulm (40 mg daily x10 more days, 20 mg daily x30 days, 10 mg daily x45 days, 5 mg daily x60 days)  -- Cont Bactrim for PJP ppx while on steroids  -- ILD panel ordered by Pulm  -- Pulm will arrange outpatient follow up in 2-3 months with repeat CT and PFTs  -- Cardiology recommending right and left heart cath after discharge once back to baseline resp status  -- OP cardiac rehab referral ordered at discharge     Elevated troponin, suspect demand ischemia/NSTEMI type 2  Abnormal stress test  *Initial troponin 90 with flat trend (85). EKG non-ischemic. ACS felt unlikely per Cardiology  *TTE this admission showed preserved EF, but difficult to assess for WMA  *Nuclear stress test (5/9) showed small area of ischemia; per Cardiology could be false positive due to body habitus  - Cardiology recommending right and left heart cath after discharge once back to baseline resp status    Recurrent atrial fibrillation s/p DCCV and amiodarone with recent redo ablation on 5/2/24 with Dr. Day   Sinus bradycardia  *Home regimen: metoprolol 25 mg BID, rivaroxaban  *Bradycardia to 40s-50s noted this admission; asymptomatic. Metoprolol decreased from 25 to 12.5 mg BID this admission     Hypertension  *Home regimen: metoprolol 25 mg BID, losartan 50 mg daily, torsemide 80 mg qAM, 40 mg qPM  - Metoprolol dose decreased for bradycardia as noted above  - Started on metolazone per Cardiology this admission  - Conts on torsemide and losartan as previously prescribed       Prolonged QT interval  *Has chronically elevated QTc in 500s range     Obstructive sleep apnea  *Chronic and stable on CPAP    Gout  *Chronic and stable on  allopurinol    GERD  *Chronic and stable on PPI    BPH  *Chronic and stable on finasteride    Consultations This Hospital Stay   CARDIOLOGY IP CONSULT  CORE CLINIC EVALUATION IP CONSULT  OCCUPATIONAL THERAPY ADULT IP CONSULT  NUTRITION SERVICES ADULT IP CONSULT  CARE MANAGEMENT / SOCIAL WORK IP CONSULT  PULMONARY IP CONSULT  CARDIAC REHAB IP CONSULT    Code Status   Full Code    Time Spent on this Encounter   I, Daiana Abarca MD, personally saw the patient today and spent 45 minutes discharging this patient.       Daiana Abarca MD  [place] [address] [facility]  [address] [place] [address], [address]  [facility] [address]  Phone: [phone]  ______________________________________________________________________    Physical Exam   Vital Signs: Temp: 97.2  F (36.2  C) Temp src: Oral BP: 117/73 Pulse: 51   Resp: 20 SpO2: 93 % O2 Device: None (Room air) Oxygen Delivery: 1 LPM  Weight: 278 lbs 1.6 oz    Constitutional: Resting comfortably, NAD  HEENT: Sclera white, conjunctiva clear, EOMI, MMM  Respiratory: Breathing non-labored. Lungs CTAB - no wheezes/crackles/rhonchi  Cardiovascular: Heart RRR, +systolic murmur. No pedal edema.   GI: +BS. Abd soft/NT  Skin: Warm and dry. No rash.  Musculoskeletal: Normal muscle bulk and tone  Neurologic: Alert and appropriate. BUTLER  Psychiatric: Calm and cooperative    Primary Care Physician   Ranjan Courtney    Discharge Orders      Basic metabolic panel     Cardiac Rehab  Referral      Cardiac Rehab  Referral      Primary Care - Care Coordination Referral      Reason for your hospital stay    Acute hypoxic respiratory failure due to congestive heart failure exacerbation and inflammatory lung disease. You were treated with diuretics and steroids with improvement     Follow-up and recommended labs and tests     Follow up with primary care provider/Clinic for hospital follow- up.  The following labs/tests are recommended: basic metabolic panel-- CLINIC WILL  CALL YOU WITH DATE/TIME (possibly Monday). It will also be in my chart.   Follow up with Cardiology at next available appointment  Follow up with Pulmonology as scheduled. They will call to arrange an appointment     Activity    Your activity upon discharge: activity as tolerated     Oxygen Adult/Peds    Oxygen Documentation  I certify that this patient, Mike Schultz has been under my care (or a nurse practitioner or physican's assistant working with me). This is the face-to-face encounter for oxygen medical necessity.      At the time of this encounter, I have reviewed the qualifying testing and have determined that supplemental oxygen is reasonable and necessary and is expected to improve the patient's condition in a home setting.         Patient has continued oxygen desaturation due to Chronic Heart Failure I50  Chronic Respiratory Failure with Hypoxia J96.11  COPD J44.9  ILD J84.9.    If portability is ordered, is the patient mobile within the home? yes    Was this visit performed as a telehealth visit: No     Diet    Follow this diet upon discharge: Low sodium diet       Significant Results and Procedures   Most Recent 3 BMP's:  Recent Labs   Lab Test 05/16/24  1052 05/15/24  1527 05/15/24  0946 05/14/24  0935   *  --  134* 138   POTASSIUM 3.4 3.8 3.1* 3.5   CHLORIDE 96*  --  94* 96*   CO2 27  --  27 26   BUN 37.1*  --  39.4* 34.6*   CR 1.20*  --  1.32* 1.17   ANIONGAP 11  --  13 16*   ASHELY 9.1  --  9.3 9.8   *  --  133* 125*   ,   Results for orders placed or performed during the hospital encounter of 05/05/24   POC US ECHO LIMITED    Impression    PROCEDURE: Point of care bedside Cardiac US  PERFORMED BY: Dr. Dale Giordano  INDICATIONS/SYMPTOM: Shortness of breath.  Concern for pericardial fluid.  PROBE: Phased array cardiac transducer.   BODY LOCATION: The US was performed in the sub-xiphoid location and/or parasternal views.  FINDINGS: No evidence of pericardial effusion.  INTERPRETATION:  There was no pericardial effusion.    IMAGE DOCUMENTATION: Images were archived to the US hard drive, and archived to PACS system.       XR Chest 2 Views    Narrative    EXAM: XR CHEST 2 VIEWS  LOCATION: [facility]  DATE: 05/05/2024    INDICATION: Shortness of breath.  COMPARISON: 02/11/2024.      Impression    IMPRESSION: Heart size is prominent. There is diffuse indistinctness of the pulmonary interstitium which can be seen with air inflammation or edema. There is some patchy opacity involving the retrocardiac region of the left lung base as well as the   lateral aspect of the right middle lobe, suspicious for infection. No pleural effusion or pneumothorax.     XR Chest 2 Views    Narrative    CHEST TWO VIEWS 5/8/2024 8:10 AM     HISTORY: Congestive heart failure, atrial fibrillation, aortic  stenosis. Prior abnormal chest x-ray.     COMPARISON: Chest radiograph 5/5/2024       Impression    IMPRESSION: Overall similar interstitial opacities and mild bibasilar  haziness favored to reflect pulmonary edema, less likely sequelae of  infection. No new focal consolidation or significant pleural effusion.  Scattered subsegmental scarring/atelectasis. No pneumothorax. Similar  mildly enlarged heart size. Degenerative changes of the spine.     SEBASTIÁN CHIU MD         SYSTEM ID:  [id number]   CT Chest w/o Contrast    Narrative    EXAM: CT CHEST W/O CONTRAST  LOCATION: [facility]  DATE: 5/8/2024    INDICATION: Ongoing shortness of breath with x ray mentioning about left retrocardiac opacity  COMPARISON: Chest x-ray 05/05/2024 and CT chest exam 3/11/2024  TECHNIQUE: CT chest without IV contrast. Multiplanar reformats were obtained. Dose reduction techniques were used.  CONTRAST: None.    FINDINGS:   LUNGS AND PLEURA: Extensive interstitial and groundglass opacities throughout both upper and lower lobes. No pleural effusions. No pneumothorax.    MEDIASTINUM/AXILLAE:  Numerous enlarged mediastinal lymph nodes. No thoracic aortic aneurysm.    CORONARY ARTERY CALCIFICATION: Moderate.    UPPER ABDOMEN: Atherosclerotic disease abdominal aorta.    MUSCULOSKELETAL: Extensive hypertrophic changes thoracic spine.      Impression    IMPRESSION:   1.  Extensive interstitial and groundglass opacities have developed in both lungs compatible with pneumonia. Covid 19 possible.  2.  Enlarged mediastinal nodes are likely reactive. Recommend follow-up CT chest exam after therapy to confirm resolution.     CT Chest Hi-Resolution wo Contrast    Narrative    CT CHEST WITHOUT CONTRAST HIGH RESOLUTION  5/13/2024 10:00 AM    HISTORY: Follow up on amiodarone toxicity.    COMPARISON: CT chest 5/8/2024, 3/11/2024.    TECHNIQUE: Volumetric helical acquisition of CT images of the chest  from the clavicles to the kidneys were acquired without IV contrast.  Images were displayed in high resolution and standard reconstructions.  Expiratory views also obtained. Radiation dose for this scan was  reduced using automated exposure control, adjustment of the mA and/or  kV according to patient size, or iterative reconstruction technique.    FINDINGS:    LUNGS AND PLEURA: Since 5/8/2024, overall improved bilateral, right  greater than left, groundglass and nodular opacities with overall  similar septal thickening. No new focal consolidation or pleural  effusion. No new or growing suspicious pulmonary nodule. No  significant air trapping.    MEDIASTINUM/AXILLAE: Decreased in size mediastinal lymph nodes. Normal  esophagus. No significant pericardial effusion. Aortic calcification.    CORONARY ARTERY CALCIFICATIONS: Mild.    UPPER ABDOMEN: No acute findings.    MUSCULOSKELETAL: Degenerative changes of the spine are similar. No new  aggressive osseous lesion.      Impression    IMPRESSION:  1.  Since 5/8/2024, improved bilateral groundglass and nodular opacity  consistent with improving pneumonitis.   2.  Similar  background of mild fibrosis.     SEBASTIÁN CHIU MD         SYSTEM ID:  [id number]   Echocardiogram Complete     Value    LVEF  55%    Narrative    [id number]  [id number]  [id number]^GAMALIEL^JOAN^KIYA     [facility]  Echocardiography Laboratory  [address] [place] [address]     Name: OTO HOWARD  MRN: [id number]  : 1950  Study Date: 2024 11:48 AM  Age: 73 yrs  Gender: Male  Patient Location: [facility]  Reason For Study: Heart Failure  Ordering Physician: JOAN ALSTON  Referring Physician: Ranjan Courtney  Performed By: Norah Suarez     BSA: 2.4 m2  Height: 68 in  Weight: 291 lb  HR: 75  BP: 110/80 mmHg  ______________________________________________________________________________  Procedure  Complete Portable Echo Adult.  ______________________________________________________________________________  Interpretation Summary     Technically challenging study due to patient body habitus.     The left ventricle is borderline dilated. There is mild concentric left  ventricular hypertrophy.  Left ventricular systolic function is low normal. The visual ejection fraction  is estimated at 55%. Regional wall motion challenging to assess due to  frequent ectopy/irregular R-R intervals however no obvious regional wall  motion abnormalities seen.  The right ventricle is borderline dilated. The right ventricular systolic  function is normal.  Moderate aortic stenosis, Vmax 3.7 m/s, mean gradient 35 mmHg, MARILYN 1.1cm2, DI  0.26. SVi 44 cc/m2. There is mild (1+) aortic regurgitation. Aortic valve  morphology is not well visualized, however it is heavily calcified.  There is no pericardial effusion.  The inferior vena cava was normal in size with preserved respiratory  variability.     This study was compared to a TTE from 2024. Global LV systolic function  is marginally less dynamic on this present  study.  ______________________________________________________________________________  Left Ventricle  The left ventricle is borderline dilated. There is mild concentric left  ventricular hypertrophy. Left ventricular systolic function is low normal. The  visual ejection fraction is estimated at 55%. Grade III or advanced diastolic  dysfunction. Regional wall motion challenging to assess due to frequent  ectopy/irregular R-R intervals however no obvious regional wall motion  abnormalities seen.     Right Ventricle  The right ventricle is borderline dilated. The right ventricular systolic  function is normal.     Atria  The left atrium is moderately dilated. Right atrial size is normal.     Mitral Valve  There is mild mitral annular calcification. There is mild (1+) mitral  regurgitation.     Tricuspid Valve  The tricuspid valve is not well visualized, but is grossly normal.     Aortic Valve  Aortic valve morphology is not well visualized, however it is heavily  calcified. There is mild (1+) aortic regurgitation. Moderate aortic stenosis,  Vmax 3.7 m/s, mean gradient 35 mmHg, MARILYN 1.1cm2, DI 0.26. SVi 44 cc/m2.     Pulmonic Valve  The pulmonic valve is not well seen, but is grossly normal.     Vessels  The aortic root is normal size. Ascending aorta dilatation is present. The  inferior vena cava was normal in size with preserved respiratory variability.     Pericardium  There is no pericardial effusion.     Rhythm  The rhythm was undetermined.  ______________________________________________________________________________  MMode/2D Measurements & Calculations  IVSd: 1.1 cm     LVIDd: 5.9 cm  LVIDs: 3.9 cm  LVPWd: 1.2 cm  FS: 34.8 %  LV mass(C)d: 300.5 grams  LV mass(C)dI: 125.3 grams/m2  Ao root diam: 3.5 cm  LA dimension: 4.8 cm  asc Aorta Diam: 4.0 cm  LA/Ao: 1.3  LVOT diam: 2.4 cm  LVOT area: 4.5 cm2  Ao root diam index Ht(cm/m): 2.1  Ao root diam index BSA (cm/m2): 1.5  Asc Ao diam index BSA (cm/m2): 1.7  Asc Ao  diam index Ht(cm/m): 2.3  LA Volume (BP): 111.0 ml     LA Volume Index (BP): 46.3 ml/m2  RWT: 0.42  TAPSE: 1.7 cm     Doppler Measurements & Calculations  MV E max seamus: 115.0 cm/sec  MV A max seamus: 50.9 cm/sec  MV E/A: 2.3  MV dec slope: 682.9 cm/sec2  MV dec time: 0.17 sec  Ao V2 max: 365.0 cm/sec  Ao max P.3 mmHg  Ao V2 mean: 284.5 cm/sec  Ao mean P.5 mmHg  Ao V2 VTI: 93.2 cm  MARILYN(I,D): 1.1 cm2  MARILYN(V,D): 1.2 cm2  AI P1/2t: 415.1 msec  LV V1 max PG: 3.6 mmHg  LV V1 max: 95.2 cm/sec  LV V1 VTI: 23.2 cm  SV(LVOT): 105.1 ml  SI(LVOT): 43.8 ml/m2  PA acc time: 0.08 sec  AV Seamus Ratio (DI): 0.26  MARILYN Index (cm2/m2): 0.47  E/E' av.4     Lateral E/e': 21.2  Medial E/e': 19.5  RV S Seamus: 10.1 cm/sec     ______________________________________________________________________________  Report approved by: Laurie Baires 2024 03:36 PM         NM Lexiscan stress test (nuc card)     Value    Target     Baseline Systolic     Baseline Diastolic BP 71    Last Stress Systolic     Last Stress Diastolic BP 64    Baseline HR 76    Max HR  77    Max Predicted HR  52    Rate Pressure Product 8,162.0    BP 56    Narrative      The nuclear stress test is abnormal.    There is a small area of mild ischemia in the apical segment(s) of the   left ventricle.    There is a small area of nontransmural infarction in the basal   inferolateral segment(s) of the left ventricle associated with a mild   degree of iva-infarct ischemia involving basal to mid inferior wall.    Left ventricular function is normal.    The left ventricular ejection fraction at rest is 56%.         Discharge Medications   Current Discharge Medication List        START taking these medications    Details   metolazone (ZAROXOLYN) 2.5 MG tablet Take 1 tablet (2.5 mg) by mouth daily  Qty: 30 tablet, Refills: 0    Associated Diagnoses: Acute on chronic congestive heart failure, unspecified heart failure type (H)      predniSONE (DELTASONE) 5  MG tablet Take 8 tablets (40 mg) by mouth daily for 10 days, THEN 4 tablets (20 mg) daily for 30 days, THEN 2 tablets (10 mg) daily for 45 days, THEN 1 tablet (5 mg) daily for 60 days.  Qty: 350 tablet, Refills: 0    Associated Diagnoses: Pneumonitis      sulfamethoxazole-trimethoprim (BACTRIM DS) 800-160 MG tablet Take 1 tablet by mouth daily while on steroids  Qty: 30 tablet, Refills: 3    Associated Diagnoses: Pneumonitis           CONTINUE these medications which have CHANGED    Details   metoprolol tartrate (LOPRESSOR) 25 MG tablet Take 0.5 tablets (12.5 mg) by mouth 2 times daily  Qty: 60 tablet, Refills: 0    Associated Diagnoses: Acute on chronic congestive heart failure, unspecified heart failure type (H)           CONTINUE these medications which have NOT CHANGED    Details   allopurinol (ZYLOPRIM) 300 MG tablet Take 1 tablet (300 mg) by mouth daily  Qty: 90 tablet, Refills: 4    Associated Diagnoses: Gout of foot, unspecified cause, unspecified chronicity, unspecified laterality      cetirizine (ZYRTEC) 10 MG tablet Take 10 mg by mouth as needed for allergies      finasteride (PROPECIA) 1 MG tablet Take 1 tablet (1 mg) by mouth daily  Qty: 90 tablet, Refills: 4    Associated Diagnoses: Alopecia      losartan (COZAAR) 100 MG tablet Take 0.5 tablets (50 mg) by mouth daily    Associated Diagnoses: Essential hypertension      omeprazole (PRILOSEC) 20 MG DR capsule Take 1 capsule (20 mg) by mouth daily  Qty: 30 capsule, Refills: 0    Associated Diagnoses: Persistent atrial fibrillation (H)      potassium chloride pushpa ER (KLOR-CON M20) 20 MEQ CR tablet Take 2 tablets (40 mEq) by mouth 2 times daily  Qty: 120 tablet, Refills: 4    Comments: Note dose increase  Associated Diagnoses: Benign essential hypertension; Hypokalemia      rivaroxaban ANTICOAGULANT (XARELTO ANTICOAGULANT) 20 MG TABS tablet Take 1 tablet (20 mg) by mouth daily (with dinner)  Qty: 90 tablet, Refills: 3    Associated Diagnoses: Persistent  atrial fibrillation (H)      torsemide (DEMADEX) 20 MG tablet Take 4 tabs (80 mg) in AM and 2 tabs (40 mg) in PM by mouth  Qty: 180 tablet, Refills: 3    Comments: Dose increased please do not fill until requested  Associated Diagnoses: Essential hypertension; Persistent atrial fibrillation (H); Lower extremity edema           Allergies   Allergies   Allergen Reactions    Amiodarone Shortness Of Breath     Suspected amiodarone toxicity involving lungs

## 2024-05-16 NOTE — TELEPHONE ENCOUNTER
Per Dr Courtney- can see pt on 5/21/24 in open slot.     Called and spoke with pt. Scheduled pt per Dr Courtney. Pt agrees to plan.

## 2024-05-16 NOTE — PLAN OF CARE
Goal Outcome Evaluation:       [id number]  Orientation: A/Ox4  Activity: Ind,   Diet/BS Checks: 2 gm Na diet, no caffeine. 1800 ml FR  Tele:  NA  IV Access/Drains: R PIV SL  Pain Management: Denies pain and nausea  Abnormal VS/Results: VSS ex tom at times on RA.  Bowel/Bladder: Cont B/B, no BM this shift. Uses urinal  Skin/Wounds: R shin scab. BLE karine.  Consults: Pulm and Cardio  D/C Disposition: Discharge plan home 5/16 w/ O2  Other Info:   - On K+ replacement protocol, Replace during shift recheck in AM

## 2024-05-17 ENCOUNTER — PATIENT OUTREACH (OUTPATIENT)
Dept: CARE COORDINATION | Facility: CLINIC | Age: 74
End: 2024-05-17
Payer: COMMERCIAL

## 2024-05-19 LAB
A FLAVUS AB SER QL ID: NORMAL
A FUMIGATUS1 AB SER QL ID: NORMAL
A FUMIGATUS2 AB SER QL ID: NORMAL
A FUMIGATUS3 AB SER QL ID: NORMAL
A FUMIGATUS6 AB SER QL ID: NORMAL
A PULLULANS AB SER QL ID: NORMAL
PIGEON SERUM AB QL ID: NORMAL
S RECTIVIRGULA AB SER QL ID: NORMAL
S VIRIDIS AB SER QL ID: NORMAL
T CANDIDUS AB SER QL: NORMAL

## 2024-05-20 DIAGNOSIS — J96.01 ACUTE RESPIRATORY FAILURE WITH HYPOXIA (H): Primary | ICD-10-CM

## 2024-05-20 NOTE — TELEPHONE ENCOUNTER
5/20/24 Chart reviewed, pt was discharged 5/16. Verification recd from Dr Day that Sept follow up in clinic is ok with him  KHerroRN 130 pm

## 2024-05-20 NOTE — TELEPHONE ENCOUNTER
He's seeing me later this week for follow-up  QP follow-up in 9/2024 as planned  Pulm rehab order cancelled    Crystal  May 20, 2024 at 2:45 PM

## 2024-05-21 ENCOUNTER — OFFICE VISIT (OUTPATIENT)
Dept: INTERNAL MEDICINE | Facility: CLINIC | Age: 74
End: 2024-05-21
Payer: COMMERCIAL

## 2024-05-21 VITALS
DIASTOLIC BLOOD PRESSURE: 60 MMHG | OXYGEN SATURATION: 95 % | HEART RATE: 91 BPM | BODY MASS INDEX: 41.59 KG/M2 | WEIGHT: 273.5 LBS | TEMPERATURE: 96.2 F | SYSTOLIC BLOOD PRESSURE: 84 MMHG

## 2024-05-21 DIAGNOSIS — J96.01 ACUTE RESPIRATORY FAILURE WITH HYPOXIA (H): ICD-10-CM

## 2024-05-21 DIAGNOSIS — I50.33 ACUTE ON CHRONIC HEART FAILURE WITH PRESERVED EJECTION FRACTION (H): ICD-10-CM

## 2024-05-21 DIAGNOSIS — Z09 ENCOUNTER FOR EXAMINATION FOLLOWING TREATMENT AT HOSPITAL: Primary | ICD-10-CM

## 2024-05-21 DIAGNOSIS — Z79.52 LONG-TERM CORTICOSTEROID USE: ICD-10-CM

## 2024-05-21 DIAGNOSIS — F19.982 DRUG-INDUCED INSOMNIA (H): ICD-10-CM

## 2024-05-21 DIAGNOSIS — R13.10 DYSPHAGIA, UNSPECIFIED TYPE: ICD-10-CM

## 2024-05-21 PROBLEM — L97.902 ULCER OF LOWER LIMB, UNSPECIFIED LATERALITY, WITH FAT LAYER EXPOSED (H): Status: RESOLVED | Noted: 2024-04-23 | Resolved: 2024-05-21

## 2024-05-21 LAB
ALBUMIN SERPL BCG-MCNC: 4 G/DL (ref 3.5–5.2)
ANION GAP SERPL CALCULATED.3IONS-SCNC: 17 MMOL/L (ref 7–15)
BUN SERPL-MCNC: 93 MG/DL (ref 8–23)
CALCIUM SERPL-MCNC: 9.4 MG/DL (ref 8.8–10.2)
CHLORIDE SERPL-SCNC: 83 MMOL/L (ref 98–107)
CREAT SERPL-MCNC: 3.11 MG/DL (ref 0.67–1.17)
DEPRECATED HCO3 PLAS-SCNC: 32 MMOL/L (ref 22–29)
EGFRCR SERPLBLD CKD-EPI 2021: 20 ML/MIN/1.73M2
GLUCOSE SERPL-MCNC: 145 MG/DL (ref 70–99)
PHOSPHATE SERPL-MCNC: 4.8 MG/DL (ref 2.5–4.5)
POTASSIUM SERPL-SCNC: 3.3 MMOL/L (ref 3.4–5.3)
SODIUM SERPL-SCNC: 132 MMOL/L (ref 135–145)

## 2024-05-21 PROCEDURE — 99495 TRANSJ CARE MGMT MOD F2F 14D: CPT | Performed by: INTERNAL MEDICINE

## 2024-05-21 PROCEDURE — 36415 COLL VENOUS BLD VENIPUNCTURE: CPT | Performed by: INTERNAL MEDICINE

## 2024-05-21 PROCEDURE — 80069 RENAL FUNCTION PANEL: CPT | Performed by: INTERNAL MEDICINE

## 2024-05-21 RX ORDER — MIRTAZAPINE 7.5 MG/1
7.5 TABLET, FILM COATED ORAL AT BEDTIME
Qty: 30 TABLET | Refills: 0 | Status: ON HOLD | OUTPATIENT
Start: 2024-05-21 | End: 2024-07-24

## 2024-05-21 RX ORDER — TRAZODONE HYDROCHLORIDE 50 MG/1
50 TABLET, FILM COATED ORAL AT BEDTIME
Qty: 60 TABLET | Refills: 0 | Status: SHIPPED | OUTPATIENT
Start: 2024-05-21 | End: 2024-05-21

## 2024-05-21 RX ORDER — OMEPRAZOLE 40 MG/1
40 CAPSULE, DELAYED RELEASE ORAL DAILY
Qty: 90 CAPSULE | Refills: 0 | Status: ON HOLD | OUTPATIENT
Start: 2024-05-21 | End: 2024-07-24

## 2024-05-21 NOTE — PROGRESS NOTES
Assessment & Plan   Encounter for examination following treatment at hospital  Acute respiratory failure with hypoxia (H)  Acute on chronic heart failure with preserved ejection fraction (H)  Highly complex patient. Unclear etiology of pneumonitis, recent hypersensitivity panels negative. Previously attributed to amiodarone but patient has been off that for some time and had recurrent pneumonitis bout here. He is on high-dose prednisone taper and sees pulmonology in ~6 weeks. Encouraged him to continue with that plan. He sees cardiology later this week. Will screen lytes and Cr today given lightheadedness and metolazone initiation inpatient. ER precautions reviewed at length.  - Renal panel; Future    Dysphagia, unspecified type  Unclear etiology of this. Ongoing for ~2 days. Can pass foods without issue but water is 'getting stuck' and he's then burping it all up. Will pursue swallow study.  - Speech Therapy  Referral; Future    Drug-induced insomnia (H)  Prednisone-induced insomnia. Considered trazodone script but his QTc has consistently been >500. Will trial mirtazapine. Discussed possibility of morning grogginess and weight gain, which I strongly encouraged him to monitor for especially given concurrent prednisone use.  - mirtazapine (REMERON) 7.5 MG tablet; Take 1 tablet (7.5 mg) by mouth at bedtime    Long-term corticosteroid use  Concern for swallowing issues. Will increase PPI dosage.  - omeprazole (PRILOSEC) 40 MG DR capsule; Take 1 capsule (40 mg) by mouth daily    Signed Electronically by:  Ranjan Bustamante MD, MPH  [facility]  Internal Medicine    Subjective   Brayden is a 73 year old presenting for the following health issues: Hospital F/U    [facility]   Hospital Follow-up Visit:  Hospital/Nursing Home/IP Rehab Facility: [facility]  Date of Admission: 5/5/2024  Date of Discharge: 5/16/2024  Reason(s) for Admission: DIANE  Was the patient in the  "ICU or did the patient experience delirium during hospitalization?  No  Do you have any other stressors you would like to discuss with your provider? No    Problems taking medications regularly:  None  Medication changes since discharge: None  Problems adhering to non-medication therapy:  None    Summary of hospitalization:  [facility] discharge summary reviewed  Diagnostic Tests/Treatments reviewed.  Follow up needed: BMP  Other Healthcare Providers Involved in Patient s Care:         Specialist appointment - Cardiology, Pulmonology  Update since discharge: fluctuating course.   Plan of care communicated with patient     I am seeing Brayden today for follow-up on a recent hospitalization. The summary of the hospitalization from the relevant discharge summary is as follows:    \"Mike Schultz is a 73 year old male with hx of a-fib refractory to medical therapy s/p PVI in 2017 with re-isolation of reconnected PVs in 2019, recent redo ablation (5/2/24), HFpEF, aortic stenosis, hx of amiodarone pneumonitis, and BOWEN on CPAP who presented to the ED on 5/5/24 for evaluation of progressive shortness of breath, and was found to have acute hypoxic respiratory failure which has been attributed to relapse of inflammatory process and acute CHF. Cardiology and Pulmonology were consulted this admission. Pt ultimately improved with IV Lasix and steroids.     Pertinent clinical history:  *11/2023: Hospitalized with acute hypoxic respiratory failure, diagnosed with amiodarone-induced lung toxicity at that time. Treated with prolonged course of steroids  *12/2023: outpatient PFTs consistent with mild restriction  *02/2024: Hospitalized with acute HFpEF in the setting of recurrent a-fib/RVR  *03/2024: Chest CT showed improvement in bilateral interstitial pulmonary infiltrates with likely component of fibrosis      *05/05/2024: Presented on this admission with progressive shortness of breath following recent ablation on " 5/2. Had tried increasing his diuretics at home without improvement. Hospital course noted below        # Acute hypoxic respiratory failure, multifactorial: Improved  # Bilateral groundglass opacities of unclear etiology  # Acute on chronic HFpEF  # Moderate aortic stenosis  # Hx of amiodarone pneumonitis  *NTpBNP >1200 in the ED. CXR showed diffuse indistinctness of the pulmonary interstitium with question of inflammation vs edema; concern for patchy opacity involving the retrocardiac region of the lung base and lateral aspect of right middle lobe concerning for infection   *Pt has been afebrile with normal WBC and procal on admission. Resp viral panel and COVID-19 negative.  *Initiated on IV Lasix on admission. Cardiology consulted.  *5/6 TTE: EF 55%, mild LVH, moderate aortic stenosis - MG 35mmHg; grade 3/advanced diastolic dysfunction.   *5/7: Started on metolazone per Cardiology  *5/8 Repeat CXR favoring pulmonary edema   *5/8 Chest CT (5/8) showed extensive bilateral groundglass opacities with reactive lymph nodes  *5/9: Pulm consulted; felt presentation with consistent with relapse of inflammatory lung process. CRP elevated >100; steroids initiated with improvement. Bactrim initiated for PJP ppx.   -- Now weaned to room air at rest, 1-2L with activity. Home O2 ordered at discharge  -- Initiated on metolazone 2.5 mg daily this admission; continued at discharge  -- Continues on PTA torsemide 80 mg qAM, 40 mg qHS  -- Conts on PTA KCl 40 meq BID   -- On prednisone 40 mg daily with prolonged taper as per Pulm (40 mg daily x10 more days, 20 mg daily x30 days, 10 mg daily x45 days, 5 mg daily x60 days)  -- Cont Bactrim for PJP ppx while on steroids  -- ILD panel ordered by Pulm  -- Pulm will arrange outpatient follow up in 2-3 months with repeat CT and PFTs  -- Cardiology recommending right and left heart cath after discharge once back to baseline resp status  -- OP cardiac rehab referral ordered at discharge    "  Elevated troponin, suspect demand ischemia/NSTEMI type 2  Abnormal stress test  *Initial troponin 90 with flat trend (85). EKG non-ischemic. ACS felt unlikely per Cardiology  *TTE this admission showed preserved EF, but difficult to assess for WMA  *Nuclear stress test (5/9) showed small area of ischemia; per Cardiology could be false positive due to body habitus  - Cardiology recommending right and left heart cath after discharge once back to baseline resp status     Recurrent atrial fibrillation s/p DCCV and amiodarone with recent redo ablation on 5/2/24 with Dr. Day   Sinus bradycardia  *Home regimen: metoprolol 25 mg BID, rivaroxaban  *Bradycardia to 40s-50s noted this admission; asymptomatic. Metoprolol decreased from 25 to 12.5 mg BID this admission     Hypertension  *Home regimen: metoprolol 25 mg BID, losartan 50 mg daily, torsemide 80 mg qAM, 40 mg qPM  - Metoprolol dose decreased for bradycardia as noted above  - Started on metolazone per Cardiology this admission  - Conts on torsemide and losartan as previously prescribed       Prolonged QT interval  *Has chronically elevated QTc in 500s range     Obstructive sleep apnea  *Chronic and stable on CPAP     Gout  *Chronic and stable on allopurinol     GERD  *Chronic and stable on PPI     BPH  *Chronic and stable on finasteride\"    Today Brayden reports he got 'no sleep last night'. He feels lightheaded today. Not using O2 much. Some SOB with exertion. He's been 'burping up' all water he's been drinking. Can eat foods fine. Protein shakes go fine too.        Objective    BP (!) 84/60   Pulse 91   Temp (!) 96.2  F (35.7  C) (Temporal)   Wt 124.1 kg (273 lb 8 oz)   SpO2 95%   BMI 41.59 kg/m    Body mass index is 41.59 kg/m .    Physical Exam   GENERAL: alert and in no distress.  EYES: conjunctivae/corneas clear. EOMs grossly intact  HENT: Facies symmetric.  RESP: CTAB, no w/r/r.  CV: RRR, no m/r/g.  MSK: Moves all four extremities freely.  SKIN: No " significant ulcers, lesions, or rashes on the visualized portions of the skin  NEURO: CN II-XII grossly intact.

## 2024-05-22 ENCOUNTER — TELEPHONE (OUTPATIENT)
Dept: FAMILY MEDICINE | Facility: CLINIC | Age: 74
End: 2024-05-22

## 2024-05-22 ENCOUNTER — MYC MEDICAL ADVICE (OUTPATIENT)
Dept: CARDIOLOGY | Facility: CLINIC | Age: 74
End: 2024-05-22

## 2024-05-22 ENCOUNTER — VIRTUAL VISIT (OUTPATIENT)
Dept: INTERNAL MEDICINE | Facility: CLINIC | Age: 74
End: 2024-05-22
Payer: COMMERCIAL

## 2024-05-22 ENCOUNTER — OFFICE VISIT (OUTPATIENT)
Dept: PEDIATRICS | Facility: CLINIC | Age: 74
End: 2024-05-22
Payer: COMMERCIAL

## 2024-05-22 VITALS
OXYGEN SATURATION: 91 % | DIASTOLIC BLOOD PRESSURE: 60 MMHG | WEIGHT: 276 LBS | RESPIRATION RATE: 20 BRPM | HEART RATE: 88 BPM | TEMPERATURE: 97.3 F | HEIGHT: 68 IN | BODY MASS INDEX: 41.83 KG/M2 | SYSTOLIC BLOOD PRESSURE: 92 MMHG

## 2024-05-22 DIAGNOSIS — I48.91 ATRIAL FIBRILLATION, UNSPECIFIED TYPE (H): ICD-10-CM

## 2024-05-22 DIAGNOSIS — N17.9 AKI (ACUTE KIDNEY INJURY) (H): Primary | ICD-10-CM

## 2024-05-22 DIAGNOSIS — J98.4 INFLAMMATION OF LUNG: ICD-10-CM

## 2024-05-22 LAB
ALBUMIN UR-MCNC: NEGATIVE MG/DL
ANION GAP SERPL CALCULATED.3IONS-SCNC: 14 MMOL/L (ref 7–15)
APPEARANCE UR: CLEAR
BILIRUB UR QL STRIP: NEGATIVE
BUN SERPL-MCNC: 99.9 MG/DL (ref 8–23)
CALCIUM SERPL-MCNC: 8.7 MG/DL (ref 8.8–10.2)
CHLORIDE SERPL-SCNC: 85 MMOL/L (ref 98–107)
COLOR UR AUTO: YELLOW
CREAT SERPL-MCNC: 3.18 MG/DL (ref 0.67–1.17)
DEPRECATED HCO3 PLAS-SCNC: 33 MMOL/L (ref 22–29)
EGFRCR SERPLBLD CKD-EPI 2021: 20 ML/MIN/1.73M2
GLUCOSE SERPL-MCNC: 146 MG/DL (ref 70–99)
GLUCOSE UR STRIP-MCNC: NEGATIVE MG/DL
HGB UR QL STRIP: NEGATIVE
KETONES UR STRIP-MCNC: NEGATIVE MG/DL
LEUKOCYTE ESTERASE UR QL STRIP: NEGATIVE
NITRATE UR QL: NEGATIVE
OSMOLALITY UR: 361 MMOL/KG (ref 100–1200)
PH UR STRIP: 5.5 [PH] (ref 5–7)
POTASSIUM SERPL-SCNC: 3.3 MMOL/L (ref 3.4–5.3)
RBC #/AREA URNS AUTO: NORMAL /HPF
SODIUM SERPL-SCNC: 132 MMOL/L (ref 135–145)
SP GR UR STRIP: 1.01 (ref 1–1.03)
UROBILINOGEN UR STRIP-ACNC: 0.2 E.U./DL
WBC #/AREA URNS AUTO: NORMAL /HPF

## 2024-05-22 PROCEDURE — 99207 REFERRAL TO ACUTE AND DIAGNOSTIC SERVICES: CPT | Mod: 93 | Performed by: INTERNAL MEDICINE

## 2024-05-22 PROCEDURE — 83935 ASSAY OF URINE OSMOLALITY: CPT | Performed by: INTERNAL MEDICINE

## 2024-05-22 PROCEDURE — 81001 URINALYSIS AUTO W/SCOPE: CPT | Performed by: INTERNAL MEDICINE

## 2024-05-22 PROCEDURE — 80048 BASIC METABOLIC PNL TOTAL CA: CPT | Performed by: INTERNAL MEDICINE

## 2024-05-22 PROCEDURE — 36415 COLL VENOUS BLD VENIPUNCTURE: CPT | Performed by: INTERNAL MEDICINE

## 2024-05-22 PROCEDURE — 99215 OFFICE O/P EST HI 40 MIN: CPT | Mod: 25 | Performed by: INTERNAL MEDICINE

## 2024-05-22 PROCEDURE — 96360 HYDRATION IV INFUSION INIT: CPT | Performed by: INTERNAL MEDICINE

## 2024-05-22 PROCEDURE — 93000 ELECTROCARDIOGRAM COMPLETE: CPT | Performed by: INTERNAL MEDICINE

## 2024-05-22 PROCEDURE — 99417 PROLNG OP E/M EACH 15 MIN: CPT | Performed by: INTERNAL MEDICINE

## 2024-05-22 RX ADMIN — Medication 1000 ML: at 11:10

## 2024-05-22 ASSESSMENT — PAIN SCALES - GENERAL: PAINLEVEL: NO PAIN (0)

## 2024-05-22 NOTE — PROGRESS NOTES
Acute and Diagnostic Services Clinic Visit    Very pleasant patient with multiple cardiopulmonary issues here with ROBIN.  History and labs support prerenal disease. His Urine osmolality would support ATN.      The patient did respond to IVF in terms of blood pressure and light headed.      We obtained a repeat BMP post fluids:  BUN/Creat slightly up, GFR the same compared to yesterday.      Assessment & Plan     ROBIN (acute kidney injury) (H24)  Clinical picture is that of prerenal.    Responded in terms of light headed and BP with IVF.    BMP unchanged to slightly worse.    Options discussed including ER, additional IVF in [facility], push PO fluids at home.  Pt opts for the latter.      We will set up a lab appt for 24 hours.    He will remain off diuretics and losartan for now.   I did not have him hold Bactrim but certainly this can be reassessed based on labs tomorrow.      - Osmolality urine  - UA with Microscopic reflex to Culture - lab collect  - sodium chloride 0.9% BOLUS 1,000 mL  - IV access  - sodium chloride (PF) 0.9% PF flush 3 mL  - Osmolality urine  - UA with Microscopic reflex to Culture - lab collect  - UA Microscopic with Reflex to Culture  - EKG 12-lead complete w/read - Clinics  - Basic metabolic panel  - Basic metabolic panel  - Basic metabolic panel  (Ca, Cl, CO2, Creat, Gluc, K, Na, BUN)    Inflammation of lung  On Prednisone and PJP prophylaxis with Bactrim.  Consider holding.      Atrial fibrillation, unspecified type (H)  He is back in A fib.  Remains on DOAC and low dose Metoprolol.  Given the relative hypotension did not want to increase this today.   Could be related to prednisone or the volume depletion.    He has a Cardiology appointment later this week.      71  minutes were spent doing chart review, history and exam, documentation and further activities per the note.      No follow-ups on file.    Jerri Owen is a 73 year old, presenting for the following health issues:  Dehydration  "(Patient is here for dehydration and hypotension.)    HPI     Pt with significant cardiopulmonary history including recurrent A fib, HfpEF, aortic stenosis, prior amiodarone penumonitis, pulmonary edema vs inflammation, BOWEN referred to [facility] with ROBIN.    The patient was in hospital 5/5-5/16 for acute respiratory failure.  Was treated for CHF exacerbation but also non defined pulmonary inflammatory process.      He had a post hospital followup visit yesterday where he was noted to be light headed, hypotensive. Labs reveal creatinine 3.11 (1.2-1.3 prior) and GFR 20 (usually more than 60).     The patient was aggressively diuresed in hospital and discharged on metolazone in addition to 120mg torsemide daily.      He also is on a prednisone taper under the direction of pulmonology, currently at 40mg per day.    He feels like he's back in A fib since yesterday.  A scenario that occurred earlier this year with prednisone also.      He has not taken torsemide, potassium, metolazone or losartan this AM.    11:37  Pt reassessed.  Resting comfortably.  No dyspnea.      12:12:  Urine osmolality 361           Objective    BP 92/60 (BP Location: Left arm, Patient Position: Supine, Cuff Size: Adult Large)   Pulse 88   Temp 97.3  F (36.3  C) (Temporal)   Resp 20   Ht 1.727 m (5' 8\")   Wt 125.2 kg (276 lb)   SpO2 91%   BMI 41.97 kg/m    Body mass index is 41.97 kg/m .  Physical Exam   GEN NAD  ENT dry MM  CV irr irr  CHEST bases clear  ABD obsese +BS  PSYCH:  alert pleasant and cooperative.              Signed Electronically by: Jose Juan Francis MD    "

## 2024-05-22 NOTE — CONFIDENTIAL NOTE
Called Patient to schedule appointment with [facility].    Patient called a second time at 09:32.  He has been scheduled for 10:15 appointment with [facility].        Cory Mack MA on 5/22/2024 at 9:34 AM

## 2024-05-22 NOTE — TELEPHONE ENCOUNTER
"5/22/24 Spoke w pt who had a post hospitalization follow up w PCP yesterday w Dr Courtney. He had labs done as he was feeling lightheaded and whoozy and was hypotensive  . BMP and renal panel checked which were highly abnormal.   He has been referred to [facility] and is currently there receiving IVF as PCP thought he was over diuresed in the hospital . Pt thinks labs will be drawn again after IVF is complete to determine next plan of care . PCP has asked pt to HOLD metolazone, torsemide, losartan, and potassium until he sees cardiology on Friday.   Pt also feels like he is back in Afib as of yesterday. He feels \" ok\" but discouraged about all of this. Reassurance given . EKG done today at [facility], in [facility].   Will route update to Crystal Campos PA-C, pt has appt on 5/22 w her.  Pt voiced understanding and agreement with plan.   Byron 1132 am  "

## 2024-05-22 NOTE — PATIENT INSTRUCTIONS
Please remain off the Losartan, Metolazone, Torsemide and Potassium    LAB tomorrow afternoon at [facility] (schedule on MyChart)    KEEP cardiology appointment on Friday    Drink 40 ounces water over the next 24 hours.

## 2024-05-22 NOTE — PROGRESS NOTES
Brayden is a 73 year old who is being evaluated via a billable telephone visit.    Originating Location (pt. Location): Home  Distant Location (provider location):  On-site    Assessment & Plan   ROBIN (acute kidney injury) (H24)  Discussed RFP results with patient. There is a chance this elevated creatinine is partially due to Bactrim use but the rest of his electrolytes clearly paint a picture of overdiuresis. Prior to this phone call, I ran this patient's case by [facility] who agreed to evaluate patient this AM and give IV fluids, etc. They will call patient this AM to coordinate. Patient in agreement. Referral placed. In addition, I have asked this patient to HOLD metolazone, torsemide, losartan, and potassium until he sees cardiology on Friday.  - Referral to Acute and Diagnostic Services (Day of diagnostic / First order acute); Future    Signed Electronically by:  Ranjan Bustamante MD, MPH  [facility]  Internal Medicine    Subjective   Brayden is a 73 year old who presents for a same day acute care virtual video visit with chief concern of: lab f/u. I saw Brayden yesterday for hospital follow-up visit. At that appointment, he was mildly lightheaded with exertion and mildly hypotensive but felt otherwise well. We check renal panel that resulted overnight showing significant ROBIN with fairly obvious overdiuresis. He denies chest pain. Denies syncope. He is dizzy walking around. 'I feel fine when I'm sitting down'.      Objective       Vitals: No vitals were obtained today due to virtual visit.    Physical Exam   General: Alert and no distress //Respiratory: No audible wheeze, cough, or shortness of breath // Psychiatric:  Appropriate affect, tone, and pace of words    Phone call duration: 12 minutes

## 2024-05-22 NOTE — Clinical Note
Brayden responded to IVF in terms of BP and subjective light headedness.  Labs are the same to slightly worse. I asked him to do another BMP in 24 hours (ordered under you).  He is also back in A fib- perhaps related to the prednisone or the volume depletion. Thankfully no RVR.  Feel free to contact me with questions.  Jose Juan

## 2024-05-23 ENCOUNTER — LAB (OUTPATIENT)
Dept: LAB | Facility: CLINIC | Age: 74
End: 2024-05-23
Payer: COMMERCIAL

## 2024-05-23 DIAGNOSIS — N17.9 AKI (ACUTE KIDNEY INJURY) (H): ICD-10-CM

## 2024-05-23 PROCEDURE — 36415 COLL VENOUS BLD VENIPUNCTURE: CPT

## 2024-05-23 PROCEDURE — 80048 BASIC METABOLIC PNL TOTAL CA: CPT

## 2024-05-24 ENCOUNTER — OFFICE VISIT (OUTPATIENT)
Dept: CARDIOLOGY | Facility: CLINIC | Age: 74
End: 2024-05-24
Payer: COMMERCIAL

## 2024-05-24 ENCOUNTER — DOCUMENTATION ONLY (OUTPATIENT)
Dept: CARDIOLOGY | Facility: CLINIC | Age: 74
End: 2024-05-24

## 2024-05-24 VITALS
BODY MASS INDEX: 42.59 KG/M2 | SYSTOLIC BLOOD PRESSURE: 132 MMHG | HEART RATE: 85 BPM | WEIGHT: 281 LBS | DIASTOLIC BLOOD PRESSURE: 78 MMHG | HEIGHT: 68 IN

## 2024-05-24 DIAGNOSIS — E87.6 HYPOKALEMIA: Primary | ICD-10-CM

## 2024-05-24 DIAGNOSIS — I10 BENIGN ESSENTIAL HYPERTENSION: ICD-10-CM

## 2024-05-24 DIAGNOSIS — I48.19 PERSISTENT ATRIAL FIBRILLATION (H): ICD-10-CM

## 2024-05-24 DIAGNOSIS — I48.19 PERSISTENT ATRIAL FIBRILLATION (H): Primary | ICD-10-CM

## 2024-05-24 DIAGNOSIS — I50.9 ACUTE ON CHRONIC CONGESTIVE HEART FAILURE, UNSPECIFIED HEART FAILURE TYPE (H): ICD-10-CM

## 2024-05-24 DIAGNOSIS — R60.0 LOWER EXTREMITY EDEMA: ICD-10-CM

## 2024-05-24 DIAGNOSIS — I10 ESSENTIAL HYPERTENSION: ICD-10-CM

## 2024-05-24 LAB
ANION GAP SERPL CALCULATED.3IONS-SCNC: 12 MMOL/L (ref 7–15)
BUN SERPL-MCNC: 63.4 MG/DL (ref 8–23)
CALCIUM SERPL-MCNC: 8.3 MG/DL (ref 8.8–10.2)
CHLORIDE SERPL-SCNC: 90 MMOL/L (ref 98–107)
CREAT SERPL-MCNC: 1.61 MG/DL (ref 0.67–1.17)
DEPRECATED HCO3 PLAS-SCNC: 33 MMOL/L (ref 22–29)
EGFRCR SERPLBLD CKD-EPI 2021: 45 ML/MIN/1.73M2
GLUCOSE SERPL-MCNC: 118 MG/DL (ref 70–99)
POTASSIUM SERPL-SCNC: 3.3 MMOL/L (ref 3.4–5.3)
SODIUM SERPL-SCNC: 135 MMOL/L (ref 135–145)

## 2024-05-24 PROCEDURE — 99214 OFFICE O/P EST MOD 30 MIN: CPT | Performed by: PHYSICIAN ASSISTANT

## 2024-05-24 PROCEDURE — G2211 COMPLEX E/M VISIT ADD ON: HCPCS | Performed by: PHYSICIAN ASSISTANT

## 2024-05-24 NOTE — PATIENT INSTRUCTIONS
Brayden - it was nice to see you today!    Today we reviewed:    Still waiting on blood work from yesterday  For now, stay off of potassium, metolazone, torsemide and losartan  Reviewed plan for getting you back into normal rhythm with medication called dofetilide once your kidneys stabilize  Reviewed plan for blockage/artery check and right heart cath after lungs improve (~3 months)    MEDICATION CHANGES:    No changes until we get blood work back - will check in with Dr. Courtney's office    RECOMMENDATIONS:    Sodium restriction (especially over weekend while holding diuretics)    FOLLOW UP:    TBD based on blood work    IMPORTANT PHONE NUMBERS FOR  [facility] ([place]):    Arrhythmia nurses Gwen, Mervat, & Caitlin: [phone]

## 2024-05-24 NOTE — PROGRESS NOTES
I called Brayden after reviewing blood work labs that were drawn yesterday.    Creatinine/BUN/GFR all improving.  Sodium normalized.  K still a bit low    Over the weekend, he will continue to hold torsemide, metolazone, KCl and losartan.  It looks like PA who messaged him about blood test results recommended he contact his team to get updated blood work early next week.  Asked Brayden to contact me if he needs me to order updated BMP Tuesday/Wednesday of next week.  I anticipate that even if creatinine is still abnormal, we will start back on K if remains low.          Component      Latest Ref Rng 5/15/2024  9:46 AM 5/16/2024  10:52 AM 5/21/2024  9:59 AM 5/22/2024  12:36 PM 5/23/2024  2:10 PM   Sodium      135 - 145 mmol/L 134 (L)  134 (L)  132 (L)  132 (L)  135    Potassium      3.4 - 5.3 mmol/L 3.1 (L)  3.4  3.3 (L)  3.3 (L)  3.3 (L)    Chloride      98 - 107 mmol/L 94 (L)  96 (L)  83 (L)  85 (L)  90 (L)    Carbon Dioxide (CO2)      22 - 29 mmol/L 27  27  32 (H)  33 (H)  33 (H)    Anion Gap      7 - 15 mmol/L 13  11  17 (H)  14  12    Glucose      70 - 99 mg/dL 133 (H)  110 (H)  145 (H)  146 (H)  118 (H)    Urea Nitrogen      8.0 - 23.0 mg/dL 39.4 (H)  37.1 (H)  93.0 (H)  99.9 (H)  63.4 (H)    Creatinine      0.67 - 1.17 mg/dL 1.32 (H)  1.20 (H)  3.11 (H)  3.18 (H)  1.61 (H)    GFR Estimate      >60 mL/min/1.73m2 57 (L)  64  20 (L)  20 (L)  45 (L)    Calcium      8.8 - 10.2 mg/dL 9.3  9.1  9.4  8.7 (L)  8.3 (L)

## 2024-05-24 NOTE — PROGRESS NOTES
[facility]    I had the pleasure of seeing Brayden when he came for follow up of AFib and HF.  This 73 year old sees Dr. Day for his history of:    1. H/o persistent AFib. EF 50-55% 1/2022 - first dx'd in 2012. Failed flecainide (and noted scar on MRI). Underwent PVI and SVC isolation 11/2017. Repeat PVI and CTI for inducible AFlutter 4/2019. Started on Amio with DCCV 11/19/2020 for recurrent AFib in setting of increased alcohol use.   Amiodarone decreased 1/2021; back in persistent AFib 12/2021 and amiodarone stopped as felt asx'c.  This was restarted 8/2022 prior to DCCV 8/22/2022. Amiodarone stopped again 1/2023. AFib restarted 7/2023 after EtOH use, now s/p DCCV (unsuccessful 9/13), then loaded with amiodarone s/p successful DCCV 10/6/2023.  Dr. Day recommended repeat AFib ablation if he started having AFib without it being triggered by EtOH. Amiodarone stopped 11/2023 d/t discovery of pneumonitis.  Recurrent AF noted after stopping amiodarone, now s/p repeat ablation with Dr. Day 5/2/2024.  Unfortunately, hospitalized in follow-up for acute respiratory failure, which did not seem to respond to diuresis and pulmonology restarted high dose steroid therapy due to relapse of inflammatory process.  2. Moderate Aortic Stenosis   3. Benign Essential HTN   4. BOWEN  - on CPAP  5. Myocardiac Scar noted on cardiac MRI 10/2017, consistent with prior MI in the left Cx territory. EF 51%.  Avoiding flecainide use  6. H/o Alcohol dependence - no EtOH since 2017, then again 3-4 months in early 2022 after he was noted to be back in AFib.Back in AFib with EtOH use 7/2023  7. Chronic AC  On Xarelto for CHADSVASc 4 (HFpEF, HTN, age, aortic plaquing)  8. Bell's Palsy  9. Lung nodules- noted on CTA to follow-up on aorta 8/2023.  Last CT 3/2024 showed improvement but not complete resolution of pulmonary nodules on CT.  Sees Dr. Mckeon  10. HFpEF - hospitalized 2/2024 and following ablation 5/2024 for recurrent  HFpEF in setting of persistent AF, prednisone use, moderate aortic stenosis.      Last Visit & Interval History:  I saw Brayden 3/2024 at which time edema was much better on his lower sodium diet, added spironolactone, and increased torsemide, but continued to note DELEON especially with going up stairs.  His O2 sats were dropping to 92% with exercise, which was an improvement.  As he had not had improvement in his SOB, we stopped spironolactone and increase KCl with plans for repeat BMP.    He contacted us a few times via [employer] following noting increasing DELEON, for which we adjusted his torsemide a few times.  We recommended he get into see Dr. aDy to discuss repeating ablation, and Dr. Day reviewed and recommended that he proceed with ablation, which was done 5/2/2024.  He had reisolation of minor reconnection of PV potentials and LA posterior wall, along with empiric ablation of CTI for presumptive typical atrial flutter noted during ablation.  It was recommended that he start Prilosec x 30 days, and due to limitations in AAD therapy, repeat stress test was recommended before starting flecainide.    He contacted us Friday 5/3 noting weight gain and increased DELEON/low O2 sats.  He contacted the on-call back 5/4, and torsemide was increased and ultimately was hospitalized 5/5-16/2024 for acute hypoxic respiratory failure thought due to relapse of inflammatory process and acute HFpEF.  He was followed by Dr. Houston and sage, which did not seem to improve his DELEON as much as expected.  Stress test during the hospitalization showed small area of apical ischemia/infarct in the basal inferolateral segments with iva-infarct ischemia in the basal/mid inferior wall. R/L heart cath considered OP if not done IP.  Pulmonary saw him and restarted steroid therapy as was felt relapse of inflammatory process was most likely cause of his acute pulmonary process/SOB, with bilateral pulmonary infiltrates.  On 5/16, he was  "discharged home @278#on 1 L oxygen.  He was discharged on metolazone 2.5 mg daily, prednisone taper, Bactrim, decreased metoprolol tartrate 12.5 mg BID, continue losartan 50 mg, KCl 40 mEq BID, Xarelto 20 mg daily, torsemide 80 AM/40 PM.    He saw PCP 5/21 and complained of lightheadedness.  Blood work showed significant abnormalities.  Weight at that visit 5/21 was 273#.  Given significant lab abnormalities, metolazone, torsemide, losartan and potassium were all held.  EKG unfortunately showed recurrent AF.  He responded to IV fluids in terms of improved BP and lightheadedness.  He continued to show significant kidney injury.  He was continued on Bactrim (I believe for prophylaxis with long-term prednisone), but losartan, potassium and diuretics remained held.    Repeat blood work yesterday is PENDING    Today's Visit:  Brayden is overall, feeling \"okay.\"  At rest, he is continues to feel well, with O2 sats 91-92%.  With exertion, he notes O2 dips and continues to note nausea and lightheadedness/dizziness.  This did get somewhat better after receiving IV fluids.  He has now been off of torsemide, metolazone, potassium, and losartan since 5/21.  Blood work from yesterday 5/23 is pending.      Despite holding diuretic therapy, weight remains stable, 272#.  No orthopnea/PND.  Remains on oxygen with exertion only.  On exam, mild LE edema, no significant JVD, and lungs are clear.  No significant HJR.    He denies any problems with chest pain, pressure, tightness.  We reviewed his stress test showing a small area of mild ischemia in the apex and nontransmural infarct of the basal inferolateral region with iva-infarct ischemia, consistent with known myocardial scar/MI in the circumflex territory noted on MRI 10/2017.    VITALS:  Vitals: /78   Pulse 85   Ht 1.727 m (5' 8\")   Wt 127.5 kg (281 lb)   BMI 42.73 kg/m      Diagnostic Testing:  EKG 5/22/2024 AFib 84 bpm on metoprolol tartrate 12.5 mg BID  Echo 5/6/2024 " LVEF 55-60%.  Mild LVH.  Moderate aortic stenosis with mean gradient 35 mmHg.  G3 DD  Nuclear stress test 5/8/2024 small area of mild ischemia in apical segments, with small area of nontransmural infarct in basal inferolateral region with mild degree of iva-infarct ischemia in basal/mid inferior wall.  Thought this was possibly due to body habitus  LVEF 55% with G3 DD.  No obvious RWMA.  Normal RV function.  Severe LAE.  Mild MAC with 1+ MR.  Heavily calcified aortic valve with 1+ AI and moderate aortic stenosis.  V-max 3.7 m/s, mean gradient 35 mmHg, MARILYN 1.1 cm , DI 0.26, SVI 44 cc/m .  Ascending aortic dilatation 4.0 cm  Echocardiogram 2/11/2024 LVEF 55-60%.  Normal RV.  Moderate CHARBEL.  Mild MAC.  1+ MR.  Moderate aortic stenosis.  Ascending aortic dilatation (4.0 cm)   ZioPatch 11/24-25/2023 (stopped early d/t hospitalization) NSR 71 bpm. Occasional ectopy. No AFIb seen.  Echo 9/6/2023 showed LVEF 49%. Nl RV. Nl LA size. 1+MR. Mod AoS with mean gradient 16mmHg, Vmax 2.7 m/s, DI 0.31  Aorta 8/2023 with aneuryamsl dilation of suprarenal aorta (3.6x3.8). follow-up CTA showed no aneurysm  EKG 7/2021 showed sinus bradycardia 45 bpm.  Nonspecific ST-T wave changes  Cardiac MRI 10/2017 showed an EF of 51% with mild hypokinesis involving the basa inferolateral segmentl  Component      Latest Ref Rng 5/2/2024  7:20 AM 5/5/2024  10:03 AM 5/16/2024  10:52 AM   WBC      4.0 - 11.0 10e3/uL 8.7  11.1 (H)  16.4 (H)    RBC Count      4.40 - 5.90 10e6/uL 4.10 (L)  4.15 (L)  4.65    Hemoglobin      13.3 - 17.7 g/dL 13.6  13.7  15.0    Hematocrit      40.0 - 53.0 % 40.3  41.5  45.6    MCV      78 - 100 fL 98  100  98    MCH      26.5 - 33.0 pg 33.2 (H)  33.0  32.3    MCHC      31.5 - 36.5 g/dL 33.7  33.0  32.9    RDW      10.0 - 15.0 % 14.2  14.2  13.9    Platelet Count      150 - 450 10e3/uL 224  261  372           Plan:  Continue to follow blood work  Dofetilide and DCCV while hospitalized after renal function  "improves      Assessment/Plan:    Persistent atrial fibrillation  As above, s/p recurrent atrial fibrillation ablation 5/2/2024, PV potentials and LA posterior wall with empiric ablation of CTI  Admitted shortly thereafter for acute hypoxic respiratory failure, thought due to HFpEF and recurrent pulmonary fibrosis/inflammation  He went back into AF 5/22/2024 by EKG in the setting of renal failure, high-dose prednisone use, and SOB    Discussed at length with Dr. Day.  Unfortunately, very challenging case given inability to tolerate AF (heart failure, even with good HR control), pulmonary fibrosis thought due to amiodarone use, which could also occur with sotalol.  He cannot use class Ic antiarrhythmic therapies due to known infarct pattern/scar on MRI and defect on stress test 5/2024.  Dr. Day has recommended dofetilide once renal function normalizes, with admission/cardioversion    Remains on AC with Xarelto.  If CrCl does not improve, will require lower dose    PLAN:  Await blood work drawn 5/23 @ 1410  Will connect with PCP once blood work is back.  I anticipate that we would not restart diuretic or losartan therapy, but may require potassium over the weekend (carefully, given renal insufficiency)    In the interim, while off torsemide and metolazone, asked him to limit sodium carefully.  Yesterday, he was \"starving\" and ate 2 hamburgers from a drive-through.  We gave him some literature about low sodium foods.  Anticipate that we will be in contact with him via [employer] or phone later this afternoon once blood work is back  Reviewed plan for dofetilide with admission/cardioversion in an effort to restore and maintain SR.  Unfortunately, he does not tolerate AF, with heart failure symptoms, even when HR is controlled continue AC, but watch creatinine clearance as dose may need to be adjusted    Respiratory failure, pulmonary fibrosis  As above, did not respond as expected to diuretic therapy and Pulmonology saw " him and restarted high-dose prednisone therapy with prolonged taper and prophylactic Bactrim  He was discharged on 1L O2-now only using at night and with exertion    PLAN:  Sees Pulmonology with PFTs and updated CT 7/2024    HFpEF  Admitted following ablation due to increase in shortness of breath and weight gain.  We had tried increasing diuretic therapy as an outpatient, but this did not improve his symptoms.  He was diuresed aggressively in the hospital and though he did improve somewhat, pulmonology got involved as he remains short of breath with abnormal CT chest.  Found to have relapsing pulmonary inflammation and prednisone restarted  He was diuresed on a massive dose of diuretics, metolazone 2.5 mg daily and torsemide 80 AM/40 PM.  BMP on admit showed stable creatinine 1.2, but just 5 days later 5/21, he had multiple electrolyte abnormalities and creatinine up to 3.11  Dr. Courtney has followed him very closely, holding diuretics and losartan and administering IV fluids  Updated blood work from yesterday as above is pending    PLAN:  R/L Heart Cath recommended once stabilized and lung inflammation has improved.  I told Brayden and Katty that this could be 3-4 months.  He confirms chest discomfort or angina.      The longitudinal plan of care for the diagnosis(es)/condition(s) as documented were addressed during this visit. Due to the added complexity in care, I will continue to support Brayden in the subsequent management and with ongoing continuity of care.        Crystal Campos PA-C, MSPAS      No orders of the defined types were placed in this encounter.    No orders of the defined types were placed in this encounter.    There are no discontinued medications.      No diagnosis found.    CURRENT MEDICATIONS:  Current Outpatient Medications   Medication Sig Dispense Refill    allopurinol (ZYLOPRIM) 300 MG tablet Take 1 tablet (300 mg) by mouth daily 90 tablet 4    cetirizine (ZYRTEC) 10 MG tablet Take 10 mg by mouth  as needed for allergies      finasteride (PROPECIA) 1 MG tablet Take 1 tablet (1 mg) by mouth daily 90 tablet 4    metoprolol tartrate (LOPRESSOR) 25 MG tablet Take 0.5 tablets (12.5 mg) by mouth 2 times daily 60 tablet 0    predniSONE (DELTASONE) 5 MG tablet Take 8 tablets (40 mg) by mouth daily for 10 days, THEN 4 tablets (20 mg) daily for 30 days, THEN 2 tablets (10 mg) daily for 45 days, THEN 1 tablet (5 mg) daily for 60 days. 350 tablet 0    rivaroxaban ANTICOAGULANT (XARELTO ANTICOAGULANT) 20 MG TABS tablet Take 1 tablet (20 mg) by mouth daily (with dinner) 90 tablet 3    sulfamethoxazole-trimethoprim (BACTRIM DS) 800-160 MG tablet Take 1 tablet by mouth daily while on steroids 30 tablet 3    losartan (COZAAR) 100 MG tablet Take 0.5 tablets (50 mg) by mouth daily (Patient not taking: Reported on 5/22/2024)      metolazone (ZAROXOLYN) 2.5 MG tablet Take 1 tablet (2.5 mg) by mouth daily (Patient not taking: Reported on 5/22/2024) 30 tablet 0    mirtazapine (REMERON) 7.5 MG tablet Take 1 tablet (7.5 mg) by mouth at bedtime (Patient not taking: Reported on 5/24/2024) 30 tablet 0    omeprazole (PRILOSEC) 40 MG DR capsule Take 1 capsule (40 mg) by mouth daily (Patient not taking: Reported on 5/22/2024) 90 capsule 0    potassium chloride pushpa ER (KLOR-CON M20) 20 MEQ CR tablet Take 2 tablets (40 mEq) by mouth 2 times daily (Patient not taking: Reported on 5/22/2024) 120 tablet 4    torsemide (DEMADEX) 20 MG tablet Take 4 tabs (80 mg) in AM and 2 tabs (40 mg) in PM by mouth (Patient not taking: Reported on 5/22/2024) 180 tablet 3       ALLERGIES     Allergies   Allergen Reactions    Amiodarone Shortness Of Breath     Suspected amiodarone toxicity involving lungs         Review of Systems:  Skin:  Negative     Eyes:  Positive for visual blurring  ENT:  Negative    Respiratory:  Positive for sleep apnea;dyspnea on exertion  Cardiovascular:  Negative for;palpitations;chest pain Positive for;exercise  "intolerance;fatigue;lightheadedness  Gastroenterology: Negative for melena;hematochezia  Genitourinary:  Positive for nocturia  Musculoskeletal:  Negative for muscular weakness  Neurologic:  Positive for    Psychiatric:  Negative    Heme/Lymph/Imm:  Negative    Endocrine:  Negative      Physical Exam:  Vitals: /78   Pulse 85   Ht 1.727 m (5' 8\")   Wt 127.5 kg (281 lb)   BMI 42.73 kg/m      Constitutional:  cooperative, alert and oriented, well developed, well nourished, in no acute distress        Skin:  warm and dry to the touch, no apparent skin lesions or masses noted        Head:  normocephalic, no masses or lesions        Eyes:  pupils equal and round;conjunctivae and lids unremarkable;sclera white        ENT:      Slight R mouth droopiness - [place] Palsy    Neck:  JVP normal        Chest:  normal breath sounds, clear to auscultation, normal A-P diameter, normal symmetry, normal respiratory excursion, no use of accessory muscles        Cardiac:   irregularly irregular rhythm       systolic ejection murmur;grade 2        Abdomen:  abdomen soft obese      Vascular: pulses full and equal                                      Extremities and Back:  no deformities, clubbing, cyanosis, erythema observed        Neurological:  no gross motor deficits            PAST MEDICAL HISTORY:  Past Medical History:   Diagnosis Date    (HFpEF) heart failure with preserved ejection fraction (H)     Acute gouty arthritis     Alcohol use disorder in remission     Amiodarone pulmonary toxicity     Aortic stenosis     Basal cell carcinoma     Of the nose    Bell's palsy     Herpes simplex without mention of complication     Hyperlipidemia     Hypertension 2000    Persistent atrial fibrillation (H)     persistent, DCCV 12/2017, ablation 11/6/17    Pulmonary nodules     Sleep apnea     CPAP       PAST SURGICAL HISTORY:  Past Surgical History:   Procedure Laterality Date    ABLATION OF DYSRHYTHMIC FOCUS  2018, 04/12/2019    " Procedure: EP Ablation Focal AFIB;  Surgeon: Fady Day MD;  Location:  HEART CARDIAC CATH LAB    ACHILLES TENDON SURGERY  1997    ANESTHESIA CARDIOVERSION N/A 2/26/2019    Procedure: ANESTHESIA CARDIOVERSION (CONNER);  Surgeon: GENERIC ANESTHESIA PROVIDER;  Location:  OR    ANESTHESIA CARDIOVERSION N/A 11/19/2020    Procedure: ANESTHESIA, FOR CARDIOVERSION (dr campbell);  Surgeon: GENERIC ANESTHESIA PROVIDER;  Location:  OR    ANESTHESIA CARDIOVERSION N/A 8/22/2022    Procedure: CARDIOVERSION;  Surgeon: GENERIC ANESTHESIA PROVIDER;  Location:  OR    ANESTHESIA CARDIOVERSION N/A 9/13/2023    Procedure: Anesthesia cardioversion;  Surgeon: GENERIC ANESTHESIA PROVIDER;  Location:  OR    ANESTHESIA CARDIOVERSION N/A 10/6/2023    Procedure: Anesthesia cardioversion;  Surgeon: GENERIC ANESTHESIA PROVIDER;  Location:  OR    BASAL CELL CARCINOMA EXCISION Right 2009    nose, took cartilage from inner ear.    CARDIOVERSION  2012, 2019    cardioversion for atr fib    COLONOSCOPY  2008    COLONOSCOPY N/A 3/22/2023    Procedure: COLONOSCOPY, FLEXIBLE, WITH LESION REMOVAL USING SNARE;  Surgeon: Anne Lucero MD;  Location:  GI    EP ABLATION FOCAL AFIB N/A 4/12/2019    Procedure: EP Ablation Focal AFIB;  Surgeon: Fady Day MD;  Location:  HEART CARDIAC CATH LAB    EP ABLATION FOCAL AFIB N/A 5/2/2024    Procedure: Ablation Atrial Fibrilation;  Surgeon: Fady Day MD;  Location:  HEART CARDIAC CATH LAB    HERNIA REPAIR, UMBILICAL  08/20/2012    Procedure: HERNIORRHAPHY UMBILICAL;  UMBILICAL HERNIA REPAIR;  Surgeon: Ra Crocker MD;  Location: [facility]    HERNIORRHAPHY UMBILICAL  8/20/2012    Procedure: HERNIORRHAPHY UMBILICAL;  UMBILICAL HERNIA REPAIR;  Surgeon: Ra Crocker MD;  Location: [facility]    ORTHOPEDIC SURGERY      ORTHOPEDIC SURGERY      OTHER SURGICAL HISTORY Right 03/2017    total KNEE ARTHROSCOPY     TOTAL KNEE ARTHROPLASTY Left 03/16/2018    ZZC NONSPECIFIC PROCEDURE  1997     achilles tendon    ZZC NONSPECIFIC PROCEDURE      knees, arthroscopy    ZZC NONSPECIFIC PROCEDURE      basal cell skin ca, nose    ZZC NONSPECIFIC PROCEDURE      flex sig; colonoscopy due 3/2008    ZZC NONSPECIFIC PROCEDURE      cardioversion for atr fib    ZZC TOTAL KNEE ARTHROPLASTY Left 2018    Dr. Malick Suarez       FAMILY HISTORY:  Family History   Problem Relation Age of Onset    Family History Negative Mother     Heart Disease Father         decesased at 42 - MI    Heart Disease Sister          MI    Lipids Sister     Lipids Sister     No Known Problems Sister     Heart Disease Brother          MI    Heart Disease Brother         CABG AT 49    Lung Cancer No family hx of        SOCIAL HISTORY:  Social History     Socioeconomic History    Marital status: Single   Tobacco Use    Smoking status: Former     Current packs/day: 0.00     Average packs/day: 1 pack/day for 34.6 years (34.6 ttl pk-yrs)     Types: Cigarettes     Start date:      Quit date: 2011     Years since quittin.7     Passive exposure: Past    Smokeless tobacco: Never    Tobacco comments:     quit    Vaping Use    Vaping status: Never Used   Substance and Sexual Activity    Alcohol use: Not Currently    Drug use: Never    Sexual activity: Not Currently     Partners: Female     Birth control/protection: None   Social History Narrative    Worked in retail (-) and insurance sales     Spends some time on [place] on [place] boat

## 2024-05-24 NOTE — LETTER
5/24/2024    Ranjan Courtney MD  [address] [facility] [address]    RE: Mike Schultz       Dear Colleague,     I had the pleasure of seeing Mike Schultz in the [facility].  [facility]    I had the pleasure of seeing Brayden when he came for follow up of AFib and HF.  This 73 year old sees Dr. Day for his history of:    1. H/o persistent AFib. EF 50-55% 1/2022 - first dx'd in 2012. Failed flecainide (and noted scar on MRI). Underwent PVI and SVC isolation 11/2017. Repeat PVI and CTI for inducible AFlutter 4/2019. Started on Amio with DCCV 11/19/2020 for recurrent AFib in setting of increased alcohol use.   Amiodarone decreased 1/2021; back in persistent AFib 12/2021 and amiodarone stopped as felt asx'c.  This was restarted 8/2022 prior to DCCV 8/22/2022. Amiodarone stopped again 1/2023. AFib restarted 7/2023 after EtOH use, now s/p DCCV (unsuccessful 9/13), then loaded with amiodarone s/p successful DCCV 10/6/2023.  Dr. Day recommended repeat AFib ablation if he started having AFib without it being triggered by EtOH. Amiodarone stopped 11/2023 d/t discovery of pneumonitis.  Recurrent AF noted after stopping amiodarone, now s/p repeat ablation with Dr. Day 5/2/2024.  Unfortunately, hospitalized in follow-up for acute respiratory failure, which did not seem to respond to diuresis and pulmonology restarted high dose steroid therapy due to relapse of inflammatory process.  2. Moderate Aortic Stenosis   3. Benign Essential HTN   4. BOWEN  - on CPAP  5. Myocardiac Scar noted on cardiac MRI 10/2017, consistent with prior MI in the left Cx territory. EF 51%.  Avoiding flecainide use  6. H/o Alcohol dependence - no EtOH since 2017, then again 3-4 months in early 2022 after he was noted to be back in AFib.Back in AFib with EtOH use 7/2023  7. Chronic AC  On Xarelto for CHADSVASc 4 (HFpEF, HTN, age, aortic plaquing)  8. Bell's Palsy  9. Lung nodules- noted on CTA to follow-up on  aorta 8/2023.  Last CT 3/2024 showed improvement but not complete resolution of pulmonary nodules on CT.  Sees Dr. Mckeon  10. HFpEF - hospitalized 2/2024 and following ablation 5/2024 for recurrent HFpEF in setting of persistent AF, prednisone use, moderate aortic stenosis.      Last Visit & Interval History:  I saw Brayden 3/2024 at which time edema was much better on his lower sodium diet, added spironolactone, and increased torsemide, but continued to note DELEON especially with going up stairs.  His O2 sats were dropping to 92% with exercise, which was an improvement.  As he had not had improvement in his SOB, we stopped spironolactone and increase KCl with plans for repeat BMP.    He contacted us a few times via [employer] following noting increasing DELEON, for which we adjusted his torsemide a few times.  We recommended he get into see Dr. Day to discuss repeating ablation, and Dr. Day reviewed and recommended that he proceed with ablation, which was done 5/2/2024.  He had reisolation of minor reconnection of PV potentials and LA posterior wall, along with empiric ablation of CTI for presumptive typical atrial flutter noted during ablation.  It was recommended that he start Prilosec x 30 days, and due to limitations in AAD therapy, repeat stress test was recommended before starting flecainide.    He contacted us Friday 5/3 noting weight gain and increased DELEON/low O2 sats.  He contacted the on-call back 5/4, and torsemide was increased and ultimately was hospitalized 5/5-16/2024 for acute hypoxic respiratory failure thought due to relapse of inflammatory process and acute HFpEF.  He was followed by Dr. Houston and sage, which did not seem to improve his DELEON as much as expected.  Stress test during the hospitalization showed small area of apical ischemia/infarct in the basal inferolateral segments with iva-infarct ischemia in the basal/mid inferior wall. R/L heart cath considered OP if not done IP.  Pulmonary saw  "him and restarted steroid therapy as was felt relapse of inflammatory process was most likely cause of his acute pulmonary process/SOB, with bilateral pulmonary infiltrates.  On 5/16, he was discharged home @278#on 1 L oxygen.  He was discharged on metolazone 2.5 mg daily, prednisone taper, Bactrim, decreased metoprolol tartrate 12.5 mg BID, continue losartan 50 mg, KCl 40 mEq BID, Xarelto 20 mg daily, torsemide 80 AM/40 PM.    He saw PCP 5/21 and complained of lightheadedness.  Blood work showed significant abnormalities.  Weight at that visit 5/21 was 273#.  Given significant lab abnormalities, metolazone, torsemide, losartan and potassium were all held.  EKG unfortunately showed recurrent AF.  He responded to IV fluids in terms of improved BP and lightheadedness.  He continued to show significant kidney injury.  He was continued on Bactrim (I believe for prophylaxis with long-term prednisone), but losartan, potassium and diuretics remained held.    Repeat blood work yesterday is PENDING    Today's Visit:  Brayden is overall, feeling \"okay.\"  At rest, he is continues to feel well, with O2 sats 91-92%.  With exertion, he notes O2 dips and continues to note nausea and lightheadedness/dizziness.  This did get somewhat better after receiving IV fluids.  He has now been off of torsemide, metolazone, potassium, and losartan since 5/21.  Blood work from yesterday 5/23 is pending.      Despite holding diuretic therapy, weight remains stable, 272#.  No orthopnea/PND.  Remains on oxygen with exertion only.  On exam, mild LE edema, no significant JVD, and lungs are clear.  No significant HJR.    He denies any problems with chest pain, pressure, tightness.  We reviewed his stress test showing a small area of mild ischemia in the apex and nontransmural infarct of the basal inferolateral region with iva-infarct ischemia, consistent with known myocardial scar/MI in the circumflex territory noted on MRI " "10/2017.    VITALS:  Vitals: /78   Pulse 85   Ht 1.727 m (5' 8\")   Wt 127.5 kg (281 lb)   BMI 42.73 kg/m      Diagnostic Testing:  EKG 5/22/2024 AFib 84 bpm on metoprolol tartrate 12.5 mg BID  Echo 5/6/2024 LVEF 55-60%.  Mild LVH.  Moderate aortic stenosis with mean gradient 35 mmHg.  G3 DD  Nuclear stress test 5/8/2024 small area of mild ischemia in apical segments, with small area of nontransmural infarct in basal inferolateral region with mild degree of iva-infarct ischemia in basal/mid inferior wall.  Thought this was possibly due to body habitus  LVEF 55% with G3 DD.  No obvious RWMA.  Normal RV function.  Severe LAE.  Mild MAC with 1+ MR.  Heavily calcified aortic valve with 1+ AI and moderate aortic stenosis.  V-max 3.7 m/s, mean gradient 35 mmHg, MARILYN 1.1 cm , DI 0.26, SVI 44 cc/m .  Ascending aortic dilatation 4.0 cm  Echocardiogram 2/11/2024 LVEF 55-60%.  Normal RV.  Moderate CHARBEL.  Mild MAC.  1+ MR.  Moderate aortic stenosis.  Ascending aortic dilatation (4.0 cm)   ZioPatch 11/24-25/2023 (stopped early d/t hospitalization) NSR 71 bpm. Occasional ectopy. No AFIb seen.  Echo 9/6/2023 showed LVEF 49%. Nl RV. Nl LA size. 1+MR. Mod AoS with mean gradient 16mmHg, Vmax 2.7 m/s, DI 0.31  Aorta 8/2023 with aneuryamsl dilation of suprarenal aorta (3.6x3.8). follow-up CTA showed no aneurysm  EKG 7/2021 showed sinus bradycardia 45 bpm.  Nonspecific ST-T wave changes  Cardiac MRI 10/2017 showed an EF of 51% with mild hypokinesis involving the basa inferolateral segmentl  Component      Latest Ref Rng 5/2/2024  7:20 AM 5/5/2024  10:03 AM 5/16/2024  10:52 AM   WBC      4.0 - 11.0 10e3/uL 8.7  11.1 (H)  16.4 (H)    RBC Count      4.40 - 5.90 10e6/uL 4.10 (L)  4.15 (L)  4.65    Hemoglobin      13.3 - 17.7 g/dL 13.6  13.7  15.0    Hematocrit      40.0 - 53.0 % 40.3  41.5  45.6    MCV      78 - 100 fL 98  100  98    MCH      26.5 - 33.0 pg 33.2 (H)  33.0  32.3    MCHC      31.5 - 36.5 g/dL 33.7  33.0  32.9    RDW   " "   10.0 - 15.0 % 14.2  14.2  13.9    Platelet Count      150 - 450 10e3/uL 224  261  372           Plan:  Continue to follow blood work  Dofetilide and DCCV while hospitalized after renal function improves      Assessment/Plan:    Persistent atrial fibrillation  As above, s/p recurrent atrial fibrillation ablation 5/2/2024, PV potentials and LA posterior wall with empiric ablation of CTI  Admitted shortly thereafter for acute hypoxic respiratory failure, thought due to HFpEF and recurrent pulmonary fibrosis/inflammation  He went back into AF 5/22/2024 by EKG in the setting of renal failure, high-dose prednisone use, and SOB    Discussed at length with Dr. Day.  Unfortunately, very challenging case given inability to tolerate AF (heart failure, even with good HR control), pulmonary fibrosis thought due to amiodarone use, which could also occur with sotalol.  He cannot use class Ic antiarrhythmic therapies due to known infarct pattern/scar on MRI and defect on stress test 5/2024.  Dr. Day has recommended dofetilide once renal function normalizes, with admission/cardioversion    Remains on AC with Xarelto.  If CrCl does not improve, will require lower dose    PLAN:  Await blood work drawn 5/23 @ 1410  Will connect with PCP once blood work is back.  I anticipate that we would not restart diuretic or losartan therapy, but may require potassium over the weekend (carefully, given renal insufficiency)    In the interim, while off torsemide and metolazone, asked him to limit sodium carefully.  Yesterday, he was \"starving\" and ate 2 hamburgers from a drive-through.  We gave him some literature about low sodium foods.  Anticipate that we will be in contact with him via [employer] or phone later this afternoon once blood work is back  Reviewed plan for dofetilide with admission/cardioversion in an effort to restore and maintain SR.  Unfortunately, he does not tolerate AF, with heart failure symptoms, even when HR is controlled " continue AC, but watch creatinine clearance as dose may need to be adjusted    Respiratory failure, pulmonary fibrosis  As above, did not respond as expected to diuretic therapy and Pulmonology saw him and restarted high-dose prednisone therapy with prolonged taper and prophylactic Bactrim  He was discharged on 1L O2-now only using at night and with exertion    PLAN:  Sees Pulmonology with PFTs and updated CT 7/2024    HFpEF  Admitted following ablation due to increase in shortness of breath and weight gain.  We had tried increasing diuretic therapy as an outpatient, but this did not improve his symptoms.  He was diuresed aggressively in the hospital and though he did improve somewhat, pulmonology got involved as he remains short of breath with abnormal CT chest.  Found to have relapsing pulmonary inflammation and prednisone restarted  He was diuresed on a massive dose of diuretics, metolazone 2.5 mg daily and torsemide 80 AM/40 PM.  BMP on admit showed stable creatinine 1.2, but just 5 days later 5/21, he had multiple electrolyte abnormalities and creatinine up to 3.11  Dr. Courtney has followed him very closely, holding diuretics and losartan and administering IV fluids  Updated blood work from yesterday as above is pending    PLAN:  R/L Heart Cath recommended once stabilized and lung inflammation has improved.  I told Brayden and Katty that this could be 3-4 months.  He confirms chest discomfort or angina.      The longitudinal plan of care for the diagnosis(es)/condition(s) as documented were addressed during this visit. Due to the added complexity in care, I will continue to support Brayden in the subsequent management and with ongoing continuity of care.        Crystal Campos PA-C, MSPAS      No orders of the defined types were placed in this encounter.    No orders of the defined types were placed in this encounter.    There are no discontinued medications.      No diagnosis found.    CURRENT MEDICATIONS:  Current  Outpatient Medications   Medication Sig Dispense Refill    allopurinol (ZYLOPRIM) 300 MG tablet Take 1 tablet (300 mg) by mouth daily 90 tablet 4    cetirizine (ZYRTEC) 10 MG tablet Take 10 mg by mouth as needed for allergies      finasteride (PROPECIA) 1 MG tablet Take 1 tablet (1 mg) by mouth daily 90 tablet 4    metoprolol tartrate (LOPRESSOR) 25 MG tablet Take 0.5 tablets (12.5 mg) by mouth 2 times daily 60 tablet 0    predniSONE (DELTASONE) 5 MG tablet Take 8 tablets (40 mg) by mouth daily for 10 days, THEN 4 tablets (20 mg) daily for 30 days, THEN 2 tablets (10 mg) daily for 45 days, THEN 1 tablet (5 mg) daily for 60 days. 350 tablet 0    rivaroxaban ANTICOAGULANT (XARELTO ANTICOAGULANT) 20 MG TABS tablet Take 1 tablet (20 mg) by mouth daily (with dinner) 90 tablet 3    sulfamethoxazole-trimethoprim (BACTRIM DS) 800-160 MG tablet Take 1 tablet by mouth daily while on steroids 30 tablet 3    losartan (COZAAR) 100 MG tablet Take 0.5 tablets (50 mg) by mouth daily (Patient not taking: Reported on 5/22/2024)      metolazone (ZAROXOLYN) 2.5 MG tablet Take 1 tablet (2.5 mg) by mouth daily (Patient not taking: Reported on 5/22/2024) 30 tablet 0    mirtazapine (REMERON) 7.5 MG tablet Take 1 tablet (7.5 mg) by mouth at bedtime (Patient not taking: Reported on 5/24/2024) 30 tablet 0    omeprazole (PRILOSEC) 40 MG DR capsule Take 1 capsule (40 mg) by mouth daily (Patient not taking: Reported on 5/22/2024) 90 capsule 0    potassium chloride pushpa ER (KLOR-CON M20) 20 MEQ CR tablet Take 2 tablets (40 mEq) by mouth 2 times daily (Patient not taking: Reported on 5/22/2024) 120 tablet 4    torsemide (DEMADEX) 20 MG tablet Take 4 tabs (80 mg) in AM and 2 tabs (40 mg) in PM by mouth (Patient not taking: Reported on 5/22/2024) 180 tablet 3       ALLERGIES     Allergies   Allergen Reactions    Amiodarone Shortness Of Breath     Suspected amiodarone toxicity involving lungs         Review of Systems:  Skin:  Negative     Eyes:   "Positive for visual blurring  ENT:  Negative    Respiratory:  Positive for sleep apnea;dyspnea on exertion  Cardiovascular:  Negative for;palpitations;chest pain Positive for;exercise intolerance;fatigue;lightheadedness  Gastroenterology: Negative for melena;hematochezia  Genitourinary:  Positive for nocturia  Musculoskeletal:  Negative for muscular weakness  Neurologic:  Positive for    Psychiatric:  Negative    Heme/Lymph/Imm:  Negative    Endocrine:  Negative      Physical Exam:  Vitals: /78   Pulse 85   Ht 1.727 m (5' 8\")   Wt 127.5 kg (281 lb)   BMI 42.73 kg/m      Constitutional:  cooperative, alert and oriented, well developed, well nourished, in no acute distress        Skin:  warm and dry to the touch, no apparent skin lesions or masses noted        Head:  normocephalic, no masses or lesions        Eyes:  pupils equal and round;conjunctivae and lids unremarkable;sclera white        ENT:      Slight R mouth droopiness - [place] Palsy    Neck:  JVP normal        Chest:  normal breath sounds, clear to auscultation, normal A-P diameter, normal symmetry, normal respiratory excursion, no use of accessory muscles        Cardiac:   irregularly irregular rhythm       systolic ejection murmur;grade 2        Abdomen:  abdomen soft obese      Vascular: pulses full and equal                                      Extremities and Back:  no deformities, clubbing, cyanosis, erythema observed        Neurological:  no gross motor deficits            PAST MEDICAL HISTORY:  Past Medical History:   Diagnosis Date    (HFpEF) heart failure with preserved ejection fraction (H)     Acute gouty arthritis     Alcohol use disorder in remission     Amiodarone pulmonary toxicity     Aortic stenosis     Basal cell carcinoma     Of the nose    Bell's palsy     Herpes simplex without mention of complication     Hyperlipidemia     Hypertension 2000    Persistent atrial fibrillation (H)     persistent, DCCV 12/2017, ablation 11/6/17    " Pulmonary nodules     Sleep apnea     CPAP       PAST SURGICAL HISTORY:  Past Surgical History:   Procedure Laterality Date    ABLATION OF DYSRHYTHMIC FOCUS  2018, 04/12/2019    Procedure: EP Ablation Focal AFIB;  Surgeon: Fady Day MD;  Location:  HEART CARDIAC CATH LAB    ACHILLES TENDON SURGERY  1997    ANESTHESIA CARDIOVERSION N/A 2/26/2019    Procedure: ANESTHESIA CARDIOVERSION (CONNER);  Surgeon: GENERIC ANESTHESIA PROVIDER;  Location:  OR    ANESTHESIA CARDIOVERSION N/A 11/19/2020    Procedure: ANESTHESIA, FOR CARDIOVERSION (dr campbell);  Surgeon: GENERIC ANESTHESIA PROVIDER;  Location:  OR    ANESTHESIA CARDIOVERSION N/A 8/22/2022    Procedure: CARDIOVERSION;  Surgeon: GENERIC ANESTHESIA PROVIDER;  Location:  OR    ANESTHESIA CARDIOVERSION N/A 9/13/2023    Procedure: Anesthesia cardioversion;  Surgeon: GENERIC ANESTHESIA PROVIDER;  Location:  OR    ANESTHESIA CARDIOVERSION N/A 10/6/2023    Procedure: Anesthesia cardioversion;  Surgeon: GENERIC ANESTHESIA PROVIDER;  Location:  OR    BASAL CELL CARCINOMA EXCISION Right 2009    nose, took cartilage from inner ear.    CARDIOVERSION  2012, 2019    cardioversion for atr fib    COLONOSCOPY  2008    COLONOSCOPY N/A 3/22/2023    Procedure: COLONOSCOPY, FLEXIBLE, WITH LESION REMOVAL USING SNARE;  Surgeon: Anne Lucero MD;  Location:  GI    EP ABLATION FOCAL AFIB N/A 4/12/2019    Procedure: EP Ablation Focal AFIB;  Surgeon: Fady Day MD;  Location:  HEART CARDIAC CATH LAB    EP ABLATION FOCAL AFIB N/A 5/2/2024    Procedure: Ablation Atrial Fibrilation;  Surgeon: Fady Day MD;  Location:  HEART CARDIAC CATH LAB    HERNIA REPAIR, UMBILICAL  08/20/2012    Procedure: HERNIORRHAPHY UMBILICAL;  UMBILICAL HERNIA REPAIR;  Surgeon: Ra Crocker MD;  Location: [facility]    HERNIORRHAPHY UMBILICAL  8/20/2012    Procedure: HERNIORRHAPHY UMBILICAL;  UMBILICAL HERNIA REPAIR;  Surgeon: Ra Crocker MD;  Location: [facility]    ORTHOPEDIC  SURGERY      ORTHOPEDIC SURGERY      OTHER SURGICAL HISTORY Right 2017    total KNEE ARTHROSCOPY     TOTAL KNEE ARTHROPLASTY Left 2018    ZZ NONSPECIFIC PROCEDURE      achilles tendon    ZZC NONSPECIFIC PROCEDURE      knees, arthroscopy    ZZC NONSPECIFIC PROCEDURE      basal cell skin ca, nose    ZZC NONSPECIFIC PROCEDURE      flex sig; colonoscopy due 3/2008    ZZC NONSPECIFIC PROCEDURE      cardioversion for atr fib    ZZC TOTAL KNEE ARTHROPLASTY Left 2018    Dr. Malick Suarez       FAMILY HISTORY:  Family History   Problem Relation Age of Onset    Family History Negative Mother     Heart Disease Father         decesased at 42 - MI    Heart Disease Sister          MI    Lipids Sister     Lipids Sister     No Known Problems Sister     Heart Disease Brother          MI    Heart Disease Brother         CABG AT 49    Lung Cancer No family hx of        SOCIAL HISTORY:  Social History     Socioeconomic History    Marital status: Single   Tobacco Use    Smoking status: Former     Current packs/day: 0.00     Average packs/day: 1 pack/day for 34.6 years (34.6 ttl pk-yrs)     Types: Cigarettes     Start date:      Quit date: 2011     Years since quittin.7     Passive exposure: Past    Smokeless tobacco: Never    Tobacco comments:     quit    Vaping Use    Vaping status: Never Used   Substance and Sexual Activity    Alcohol use: Not Currently    Drug use: Never    Sexual activity: Not Currently     Partners: Female     Birth control/protection: None   Social History Narrative    Worked in retail (-) and insurance sales     Spends some time on [place] on [place]                 Thank you for allowing me to participate in the care of your patient.      Sincerely,     Natalie Campos PA-C     [facility]  cc:   Natalie Campos PA-C  [id number] [place] [id number]

## 2024-05-28 ENCOUNTER — LAB (OUTPATIENT)
Dept: LAB | Facility: CLINIC | Age: 74
End: 2024-05-28
Payer: COMMERCIAL

## 2024-05-28 ENCOUNTER — DOCUMENTATION ONLY (OUTPATIENT)
Dept: INTERNAL MEDICINE | Facility: CLINIC | Age: 74
End: 2024-05-28

## 2024-05-28 ENCOUNTER — MYC MEDICAL ADVICE (OUTPATIENT)
Dept: CARDIOLOGY | Facility: CLINIC | Age: 74
End: 2024-05-28

## 2024-05-28 DIAGNOSIS — N17.9 AKI (ACUTE KIDNEY INJURY) (H): Primary | ICD-10-CM

## 2024-05-28 DIAGNOSIS — N17.9 AKI (ACUTE KIDNEY INJURY) (H): ICD-10-CM

## 2024-05-28 LAB
ANION GAP SERPL CALCULATED.3IONS-SCNC: 11 MMOL/L (ref 7–15)
BUN SERPL-MCNC: 24.3 MG/DL (ref 8–23)
CALCIUM SERPL-MCNC: 8.8 MG/DL (ref 8.8–10.2)
CHLORIDE SERPL-SCNC: 100 MMOL/L (ref 98–107)
CREAT SERPL-MCNC: 1.01 MG/DL (ref 0.67–1.17)
DEPRECATED HCO3 PLAS-SCNC: 27 MMOL/L (ref 22–29)
EGFRCR SERPLBLD CKD-EPI 2021: 79 ML/MIN/1.73M2
GLUCOSE SERPL-MCNC: 143 MG/DL (ref 70–99)
POTASSIUM SERPL-SCNC: 3.9 MMOL/L (ref 3.4–5.3)
SODIUM SERPL-SCNC: 138 MMOL/L (ref 135–145)

## 2024-05-28 PROCEDURE — 36415 COLL VENOUS BLD VENIPUNCTURE: CPT

## 2024-05-28 PROCEDURE — 80048 BASIC METABOLIC PNL TOTAL CA: CPT

## 2024-05-28 RX ORDER — LOSARTAN POTASSIUM 100 MG/1
50 TABLET ORAL DAILY
Status: ON HOLD | COMMUNITY
Start: 2024-05-28 | End: 2024-08-09

## 2024-05-28 RX ORDER — TORSEMIDE 20 MG/1
20 TABLET ORAL DAILY
Status: SHIPPED
Start: 2024-05-28 | End: 2024-05-28

## 2024-05-28 RX ORDER — TORSEMIDE 20 MG/1
20 TABLET ORAL DAILY
Qty: 90 TABLET | Refills: 2 | Status: SHIPPED | OUTPATIENT
Start: 2024-05-28 | End: 2024-05-30

## 2024-05-28 RX ORDER — POTASSIUM CHLORIDE 1500 MG/1
20 TABLET, EXTENDED RELEASE ORAL DAILY
Status: SHIPPED
Start: 2024-05-28 | End: 2024-05-28

## 2024-05-28 RX ORDER — METOLAZONE 2.5 MG/1
2.5 TABLET ORAL DAILY
COMMUNITY
Start: 2024-05-28 | End: 2024-06-05

## 2024-05-28 RX ORDER — POTASSIUM CHLORIDE 1500 MG/1
20 TABLET, EXTENDED RELEASE ORAL DAILY
Qty: 90 TABLET | Refills: 1 | Status: SHIPPED | OUTPATIENT
Start: 2024-05-28 | End: 2024-05-30

## 2024-05-28 NOTE — PROGRESS NOTES
Please let Brayden know that I reviewed blood work done today and it looks much better!  His Cr and GFR have completely normalized, which is not surprising given all of his medications have been held! This is great news!!    I think he is likely gaining some fluid weight given BP is going up, breathing is worse and weight is going up.  Some of this certainly could be due to his increased caloric intake being on prednisone  As we discussed in clinic on Friday, not surprising that he will need to add back some diuretic, but we will do so very gradually and follow his BMP closely.    PLAN:  1. Starting tomorrow, Restart torsemide but only at 20 mg daily (1 tab)   2. Starting tomorrow, Restart KCl but only at 20 mEq daily (1 tab)  3. Hold off on restarting losartan, metolazone for now  4.  BMP on Monday (I've ordered). When we get results, pls get update on:   Weight   Breathing   O2 sats   Swelling  5.  Limit the extra calories! I know he's hungry b/c of prednisone but calorie weight gain will not help anything.  6. Continue to limit sodium in die    Component      Latest Ref Rng 5/21/2024  9:59 AM 5/22/2024  12:36 PM 5/23/2024  2:10 PM 5/28/2024  2:00 PM   Sodium      135 - 145 mmol/L 132 (L)  132 (L)  135  138    Potassium      3.4 - 5.3 mmol/L 3.3 (L)  3.3 (L)  3.3 (L)  3.9    Chloride      98 - 107 mmol/L 83 (L)  85 (L)  90 (L)  100    Carbon Dioxide (CO2)      22 - 29 mmol/L 32 (H)  33 (H)  33 (H)  27    Anion Gap      7 - 15 mmol/L 17 (H)  14  12  11    Glucose      70 - 99 mg/dL 145 (H)  146 (H)  118 (H)  143 (H)    Urea Nitrogen      8.0 - 23.0 mg/dL 93.0 (H)  99.9 (H)  63.4 (H)  24.3 (H)    Creatinine      0.67 - 1.17 mg/dL 3.11 (H)  3.18 (H)  1.61 (H)  1.01    GFR Estimate      >60 mL/min/1.73m2 20 (L)  20 (L)  45 (L)  79    Calcium      8.8 - 10.2 mg/dL 9.4  8.7 (L)  8.3 (L)  8.8      Reza - Crystal  May 28, 2024 at 4:26 PM

## 2024-05-28 NOTE — PROGRESS NOTES
Mike Schultz has an upcoming lab appointment:    Future Appointments   Date Time Provider Department [place]   5/28/2024  2:15 PM [facility]    6/3/2024  8:00 AM  [facility]   6/5/2024  8:00 AM Tom Cason MD [facility]   7/2/2024  8:20 AM Tom Cason MD [facility]   7/11/2024  8:00 AM [facility]   7/11/2024  8:30 AM [facility]   7/11/2024  9:00 AM  PFL 6 MINUTE WALK 2 [facility]   7/11/2024  9:30 AM Vinayak Valles MD [facility]   7/15/2024 10:00 AM Jeanette Mckeon MD [facility]   7/29/2024  8:30 AM Curtis Parra MD [facility]   9/10/2024  4:15 PM Fady Day MD [facility]   9/30/2024  9:45 AM Maninder Huffman MD [facility]   11/11/2024 10:15 AM Pedro Luis Romeo MD [facility]     Patient is scheduled for the following lab(s): blood draw    There is no order available. Please review and place either future orders or HMPO (Review of Health Maintenance Protocol Orders), as appropriate.    Health Maintenance Due   Topic    ANNUAL REVIEW OF HM ORDERS      Rafael Rodríguez

## 2024-05-28 NOTE — PROGRESS NOTES
Spoke to patient to review labs and recommendations per TOBI Rivas:  Please let Brayden know that I reviewed blood work done today and it looks much better!  His Cr and GFR have completely normalized, which is not surprising given all of his medications have been held! This is great news!!     I think he is likely gaining some fluid weight given BP is going up, breathing is worse and weight is going up.  Some of this certainly could be due to his increased caloric intake being on prednisone  As we discussed in clinic on Friday, not surprising that he will need to add back some diuretic, but we will do so very gradually and follow his BMP closely.     PLAN:  1. Starting tomorrow, Restart torsemide but only at 20 mg daily (1 tab)   2. Starting tomorrow, Restart KCl but only at 20 mEq daily (1 tab)  3. Hold off on restarting losartan, metolazone for now  4.  BMP on Monday (I've ordered). When we get results, pls get update on:              Weight              Breathing              O2 sats              Swelling  Patient will schedule this.  He will also update us on his symptoms via my chart on 6/3.  Asked also to send BP readings and heart rates.  5.  Limit the extra calories! I know he's hungry b/c of prednisone but calorie weight gain will not help anything.  6. Continue to limit sodium in diet    Patient provided verbal understanding regarding above.  Updated medication list and pharmacy.  Patient requesting my chart message be sent.  PRABHA Palomo

## 2024-05-28 NOTE — PROGRESS NOTES
Please call Brayden and check-in.    I have reviewed with Dr. Courtney and I will order the BMP. Pls get today (ideally) or tomorrow AM    He has been off metolazone, torsemide, losartan, KCl  Please get update on breathing, weight, swelling, BP, etc....    Crystal   May 28, 2024 at 11:13 AM

## 2024-05-28 NOTE — PROGRESS NOTES
Spoke to patient in follow up to OV on 5/24 with TOBI Rivas:  Please call Brayden and check-in.     I have reviewed with Dr. Courtney and I will order the BMP. Pls get today (ideally) or tomorrow AM     He has been off metolazone, torsemide, losartan, KCl  Please get update on breathing, weight, swelling, BP, etc....  ---breathing is getting worse compared to Friday.  Started taper down on his prednisoe on 5/26 and is taking 20mg po every day for 30 days.  Using oxygen at night but does take it with him when he is out and about as he does get easily winded.  ---swelling is stable   --BP reading 130's systolic over 75 diastolic. Has not been checking it every day  ---weight is up about 5 pounds he feels its d/t eating more as the prednisone makes him want to eat all the time.  He admits to not weighing himself every day  ---has scheduled BMP today at 2:30pm.    Will update TOBI Rivas regarding above.  PRABHA Palomo

## 2024-06-03 ENCOUNTER — DOCUMENTATION ONLY (OUTPATIENT)
Dept: CARDIOLOGY | Facility: CLINIC | Age: 74
End: 2024-06-03

## 2024-06-03 ENCOUNTER — HOSPITAL ENCOUNTER (OUTPATIENT)
Dept: CARDIAC REHAB | Facility: CLINIC | Age: 74
Discharge: HOME | End: 2024-06-03
Attending: INTERNAL MEDICINE
Payer: COMMERCIAL

## 2024-06-03 ENCOUNTER — LAB (OUTPATIENT)
Dept: LAB | Facility: CLINIC | Age: 74
End: 2024-06-03
Payer: COMMERCIAL

## 2024-06-03 DIAGNOSIS — I10 ESSENTIAL HYPERTENSION: ICD-10-CM

## 2024-06-03 DIAGNOSIS — I48.19 PERSISTENT ATRIAL FIBRILLATION (H): Primary | ICD-10-CM

## 2024-06-03 DIAGNOSIS — I35.0 NONRHEUMATIC AORTIC VALVE STENOSIS: ICD-10-CM

## 2024-06-03 DIAGNOSIS — I42.8 OTHER CARDIOMYOPATHIES (H): ICD-10-CM

## 2024-06-03 DIAGNOSIS — I50.9 ACUTE ON CHRONIC CONGESTIVE HEART FAILURE, UNSPECIFIED HEART FAILURE TYPE (H): ICD-10-CM

## 2024-06-03 DIAGNOSIS — E87.6 HYPOKALEMIA: ICD-10-CM

## 2024-06-03 DIAGNOSIS — I48.21 ATRIAL FIBRILLATION, PERMANENT (H): ICD-10-CM

## 2024-06-03 LAB
ANION GAP SERPL CALCULATED.3IONS-SCNC: 13 MMOL/L (ref 7–15)
BUN SERPL-MCNC: 20.6 MG/DL (ref 8–23)
CALCIUM SERPL-MCNC: 8.4 MG/DL (ref 8.8–10.2)
CHLORIDE SERPL-SCNC: 102 MMOL/L (ref 98–107)
CREAT SERPL-MCNC: 1.75 MG/DL (ref 0.67–1.17)
DEPRECATED HCO3 PLAS-SCNC: 26 MMOL/L (ref 22–29)
EGFRCR SERPLBLD CKD-EPI 2021: 41 ML/MIN/1.73M2
GLUCOSE SERPL-MCNC: 122 MG/DL (ref 70–99)
POTASSIUM SERPL-SCNC: 3.6 MMOL/L (ref 3.4–5.3)
SODIUM SERPL-SCNC: 141 MMOL/L (ref 135–145)

## 2024-06-03 PROCEDURE — 80048 BASIC METABOLIC PNL TOTAL CA: CPT

## 2024-06-03 PROCEDURE — 36415 COLL VENOUS BLD VENIPUNCTURE: CPT

## 2024-06-03 PROCEDURE — 93797 PHYS/QHP OP CAR RHAB WO ECG: CPT

## 2024-06-03 PROCEDURE — 93798 PHYS/QHP OP CAR RHAB W/ECG: CPT

## 2024-06-03 NOTE — PROGRESS NOTES
Spoke to patient to review labs and get update on symptoms:    Please call Brayden and let him know that I reviewed his blood work after I increase torsemide to 60 mg daily.     Before we make any changes, what is the weight (295#on 5/30.  Any improvement in breathing or O2 sats?  How is swelling?  Weight today 292#  Breathing---stable when he is resting.  When he exerts himself he becomes very SOB and O2 sats drop in the 80's (doesn't always check).  Today in therapy he was in the high 80's after his 6 minute walk.    Swelling--ankles and feet are very swollen (pitting edema).  He reports the swelling is good upon arising and gets worse as the day goes on.  Admits to elevating legs when sitting.  Has not used compression socks.    Started therapy today 3x/week     If the increase in torsemide has not helped anything, I do not want to keep pushing the creatinine.  If he is feeling quite a bit better, we may need to.    He reports the medication has helped some but it has not been significant.    I will be able to look at your response tonight, but will look tomorrow when I am in [place].  Keep me posted    Update sent to TOBI Rivas.  PRABHA Palomo

## 2024-06-03 NOTE — PROGRESS NOTES
Please call Brayden and let him know that I reviewed his blood work after I increase torsemide to 60 mg daily.    Before we make any changes, what is the weight (295#on 5/30.  Any improvement in breathing or O2 sats?  How is swelling?    If the increase in torsemide has not helped anything, I do not want to keep pushing the creatinine.  If he is feeling quite a bit better, we may need to.    I will be able to look at your response tonight, but will look tomorrow when I am in [place].  Keep me posted      Component      Latest Ref Rng 5/22/2024  12:36 PM 5/23/2024  2:10 PM 5/28/2024  2:00 PM 6/3/2024  1:28 PM   Sodium      135 - 145 mmol/L 132 (L)  135  138  141    Potassium      3.4 - 5.3 mmol/L 3.3 (L)  3.3 (L)  3.9  3.6    Chloride      98 - 107 mmol/L 85 (L)  90 (L)  100  102    Carbon Dioxide (CO2)      22 - 29 mmol/L 33 (H)  33 (H)  27  26    Anion Gap      7 - 15 mmol/L 14  12  11  13    Urea Nitrogen      8.0 - 23.0 mg/dL 99.9 (H)  63.4 (H)  24.3 (H)  20.6    Creatinine      0.67 - 1.17 mg/dL 3.18 (H)  1.61 (H)  1.01  1.75 (H)    GFR Estimate      >60 mL/min/1.73m2 20 (L)  45 (L)  79  41 (L)    Calcium      8.8 - 10.2 mg/dL 8.7 (L)  8.3 (L)  8.8  8.4 (L)    Glucose      70 - 99 mg/dL 146 (H)  118 (H)  143 (H)  122 (H)

## 2024-06-05 ENCOUNTER — MYC MEDICAL ADVICE (OUTPATIENT)
Dept: CARDIOLOGY | Facility: CLINIC | Age: 74
End: 2024-06-05

## 2024-06-05 ENCOUNTER — HOSPITAL ENCOUNTER (OUTPATIENT)
Dept: CARDIAC REHAB | Facility: CLINIC | Age: 74
Discharge: HOME | End: 2024-06-05
Attending: INTERNAL MEDICINE
Payer: COMMERCIAL

## 2024-06-05 DIAGNOSIS — I48.19 PERSISTENT ATRIAL FIBRILLATION (H): Primary | ICD-10-CM

## 2024-06-05 DIAGNOSIS — I50.9 ACUTE ON CHRONIC CONGESTIVE HEART FAILURE, UNSPECIFIED HEART FAILURE TYPE (H): ICD-10-CM

## 2024-06-05 PROCEDURE — 93798 PHYS/QHP OP CAR RHAB W/ECG: CPT | Performed by: REHABILITATION PRACTITIONER

## 2024-06-05 RX ORDER — POTASSIUM CHLORIDE 1500 MG/1
20 TABLET, EXTENDED RELEASE ORAL DAILY
COMMUNITY
Start: 2024-06-05 | End: 2024-06-12

## 2024-06-05 RX ORDER — METOPROLOL TARTRATE 25 MG/1
12.5 TABLET, FILM COATED ORAL 2 TIMES DAILY
Qty: 90 TABLET | Refills: 4 | Status: ON HOLD | OUTPATIENT
Start: 2024-06-05 | End: 2024-08-09

## 2024-06-05 NOTE — PROGRESS NOTES
I did not see this yesterday (looks like it was Done'd instead of Routed).    As this dose of torsemide has helped a little pls have him continued 60 mg daily  Restart losartan 50 mg (1/2 of a 100 mg tab) to help afterload reduction  Decrease KCl to just 20 mEq daily as restarting losartan may bump K  For now, stay OFF metolazone    ** Pls update EPIC med list and take metolazone off **    Pls have him start to wear compression socks for the LE edema, especially as his swelling is so much better in the AM. Can be from [employer] (15-20 mmHg compression) and look like real socks  Limit sodium in diet ... Processed/store-bought/frozen meals/pre-prepared food, etc  Elevate legs whenever possible, especially as swelling is so much better in the AM.    Pls let him know I've reached out to Pulm. In the past I've attributed his DELEON solely to HFpEF/AFib and it turns out his pulmonary fibrosis had gotten worse, so wanted to keep them informed that his O2 sats are still in 80s.  Keep appt with them in 7/2024 as planned.     BMP on Tuesday or Wednesday of next week please. I've ordered.    Reza Rojas  June 5, 2024 at 7:34 AM

## 2024-06-05 NOTE — PROGRESS NOTES
Spoke to pt who was just finishing his therapy when this writer called.  Discussed Crystal recommendations.     As this dose of torsemide has helped a little pls have him continued 60 mg daily  Restart losartan 50 mg (1/2 of a 100 mg tab) to help afterload reduction-med list updated  Decrease KCl to just 20 mEq daily as restarting losartan may bump K-med list updated    For now, stay OFF metolazone-removed from med list.        Pls have him start to wear compression socks for the LE edema, especially as his swelling is so much better in the AM. Can be from [employer] (15-20 mmHg compression) and look like real socks-Pt has some, but will buy more.     Limit sodium in diet ... Processed/store-bought/frozen meals/pre-prepared food, etc  Elevate legs whenever possible, especially as swelling is so much better in the AM.-Pt aware to watch the sodium in his diet and will try to do better elevating feet.      Pls let him know I've reached out to Pulm. In the past I've attributed his DELEON solely to HFpEF/AFib and it turns out his pulmonary fibrosis had gotten worse, so wanted to keep them informed that his O2 sats are still in 80s.  Keep appt with them in 7/2024 as planned. Pt asked about his Pulm fibrosis and asked what it was. Pt will be having a visit with pulmonary in July and asked that pt have them explain what he has and plans going forward.      BMP on Tuesday or Wednesday of next week please. I've ordered. Pt will have done at [facility], but them done state.      Crystal message has also been sent to pt via logtrust so that he can review the changes. Miguel Ángel

## 2024-06-07 ENCOUNTER — HOSPITAL ENCOUNTER (OUTPATIENT)
Dept: CARDIAC REHAB | Facility: CLINIC | Age: 74
Discharge: HOME | End: 2024-06-07
Attending: INTERNAL MEDICINE
Payer: COMMERCIAL

## 2024-06-07 PROCEDURE — 93798 PHYS/QHP OP CAR RHAB W/ECG: CPT | Performed by: REHABILITATION PRACTITIONER

## 2024-06-09 DIAGNOSIS — R10.13 DYSPEPSIA: Primary | ICD-10-CM

## 2024-06-10 ENCOUNTER — HOSPITAL ENCOUNTER (OUTPATIENT)
Dept: CARDIAC REHAB | Facility: CLINIC | Age: 74
Discharge: HOME | End: 2024-06-10
Attending: INTERNAL MEDICINE
Payer: COMMERCIAL

## 2024-06-10 PROCEDURE — 93798 PHYS/QHP OP CAR RHAB W/ECG: CPT

## 2024-06-11 ENCOUNTER — LAB (OUTPATIENT)
Dept: LAB | Facility: CLINIC | Age: 74
End: 2024-06-11
Payer: COMMERCIAL

## 2024-06-11 DIAGNOSIS — I48.19 PERSISTENT ATRIAL FIBRILLATION (H): ICD-10-CM

## 2024-06-11 LAB
ANION GAP SERPL CALCULATED.3IONS-SCNC: 14 MMOL/L (ref 7–15)
BUN SERPL-MCNC: 23.1 MG/DL (ref 8–23)
CALCIUM SERPL-MCNC: 8.2 MG/DL (ref 8.8–10.2)
CHLORIDE SERPL-SCNC: 96 MMOL/L (ref 98–107)
CREAT SERPL-MCNC: 1.22 MG/DL (ref 0.67–1.17)
DEPRECATED HCO3 PLAS-SCNC: 30 MMOL/L (ref 22–29)
EGFRCR SERPLBLD CKD-EPI 2021: 63 ML/MIN/1.73M2
GLUCOSE SERPL-MCNC: 161 MG/DL (ref 70–99)
POTASSIUM SERPL-SCNC: 3.4 MMOL/L (ref 3.4–5.3)
SODIUM SERPL-SCNC: 140 MMOL/L (ref 135–145)

## 2024-06-11 PROCEDURE — 80048 BASIC METABOLIC PNL TOTAL CA: CPT

## 2024-06-11 PROCEDURE — 36415 COLL VENOUS BLD VENIPUNCTURE: CPT

## 2024-06-12 ENCOUNTER — HOSPITAL ENCOUNTER (OUTPATIENT)
Dept: CARDIAC REHAB | Facility: CLINIC | Age: 74
Discharge: HOME | End: 2024-06-12
Attending: INTERNAL MEDICINE
Payer: COMMERCIAL

## 2024-06-12 ENCOUNTER — DOCUMENTATION ONLY (OUTPATIENT)
Dept: CARDIOLOGY | Facility: CLINIC | Age: 74
End: 2024-06-12
Payer: COMMERCIAL

## 2024-06-12 PROCEDURE — 93798 PHYS/QHP OP CAR RHAB W/ECG: CPT

## 2024-06-12 NOTE — PROGRESS NOTES
MyChart note sent  Cr improved  Breathing improved, swelling improved, but weight up (290-->295#)  On torsemide 60 mg, losartan 50, KCl 20mEq    Will increase KCl to 40 mEq daily, continue torsemide 60 as sxs improving      Component      Latest Ref Rng 5/22/2024  12:36 PM 5/23/2024  2:10 PM 5/28/2024  2:00 PM 6/3/2024  1:28 PM 6/11/2024  11:39 AM   Sodium      135 - 145 mmol/L 132 (L)  135  138  141  140    Potassium      3.4 - 5.3 mmol/L 3.3 (L)  3.3 (L)  3.9  3.6  3.4    Chloride      98 - 107 mmol/L 85 (L)  90 (L)  100  102  96 (L)    Carbon Dioxide (CO2)      22 - 29 mmol/L 33 (H)  33 (H)  27  26  30 (H)    Anion Gap      7 - 15 mmol/L 14  12  11  13  14    Urea Nitrogen      8.0 - 23.0 mg/dL 99.9 (H)  63.4 (H)  24.3 (H)  20.6  23.1 (H)    Creatinine      0.67 - 1.17 mg/dL 3.18 (H)  1.61 (H)  1.01  1.75 (H)  1.22 (H)    GFR Estimate      >60 mL/min/1.73m2 20 (L)  45 (L)  79  41 (L)  63    Calcium      8.8 - 10.2 mg/dL 8.7 (L)  8.3 (L)  8.8  8.4 (L)  8.2 (L)    Glucose      70 - 99 mg/dL 146 (H)  118 (H)  143 (H)  122 (H)  161 (H)

## 2024-06-12 NOTE — TELEPHONE ENCOUNTER
I put in order to see him mid July (after Pulm visit) - happy to see him 1130 on a rounding day if needed, just ALAN Rojas

## 2024-06-14 ENCOUNTER — HOSPITAL ENCOUNTER (OUTPATIENT)
Dept: CARDIAC REHAB | Facility: CLINIC | Age: 74
Discharge: HOME | End: 2024-06-14
Attending: INTERNAL MEDICINE
Payer: COMMERCIAL

## 2024-06-14 PROCEDURE — 93798 PHYS/QHP OP CAR RHAB W/ECG: CPT | Performed by: REHABILITATION PRACTITIONER

## 2024-06-18 ENCOUNTER — DOCUMENTATION ONLY (OUTPATIENT)
Dept: CARDIOLOGY | Facility: CLINIC | Age: 74
End: 2024-06-18

## 2024-06-18 ENCOUNTER — LAB (OUTPATIENT)
Dept: LAB | Facility: CLINIC | Age: 74
End: 2024-06-18
Payer: COMMERCIAL

## 2024-06-18 DIAGNOSIS — I10 BENIGN ESSENTIAL HYPERTENSION: ICD-10-CM

## 2024-06-18 DIAGNOSIS — I48.19 PERSISTENT ATRIAL FIBRILLATION (H): ICD-10-CM

## 2024-06-18 DIAGNOSIS — E87.6 HYPOKALEMIA: ICD-10-CM

## 2024-06-18 DIAGNOSIS — I10 ESSENTIAL HYPERTENSION: Primary | ICD-10-CM

## 2024-06-18 LAB
ANION GAP SERPL CALCULATED.3IONS-SCNC: 12 MMOL/L (ref 7–15)
BUN SERPL-MCNC: 21 MG/DL (ref 8–23)
CALCIUM SERPL-MCNC: 8.8 MG/DL (ref 8.8–10.2)
CHLORIDE SERPL-SCNC: 97 MMOL/L (ref 98–107)
CREAT SERPL-MCNC: 1.15 MG/DL (ref 0.67–1.17)
DEPRECATED HCO3 PLAS-SCNC: 30 MMOL/L (ref 22–29)
EGFRCR SERPLBLD CKD-EPI 2021: 68 ML/MIN/1.73M2
GLUCOSE SERPL-MCNC: 136 MG/DL (ref 70–99)
POTASSIUM SERPL-SCNC: 3.3 MMOL/L (ref 3.4–5.3)
SODIUM SERPL-SCNC: 139 MMOL/L (ref 135–145)

## 2024-06-18 PROCEDURE — 80048 BASIC METABOLIC PNL TOTAL CA: CPT

## 2024-06-18 PROCEDURE — 36415 COLL VENOUS BLD VENIPUNCTURE: CPT

## 2024-06-18 RX ORDER — POTASSIUM CHLORIDE 1500 MG/1
60 TABLET, EXTENDED RELEASE ORAL DAILY
Qty: 270 TABLET | Refills: 2 | Status: SHIPPED | OUTPATIENT
Start: 2024-06-18 | End: 2024-07-01

## 2024-06-18 NOTE — PROGRESS NOTES
Spoke to patient regarding lab results and recommendations per TOBI Rivas:  Pls let Brayden know I reviewed blood work on torsemide 60, lsoatan 50, KCl 40 (increased from 20 mEq 6/12).     Did he increase KCl to 40 mEq on 6/12 as planned? His K dropped, which is surprising.   Regardless, pls increase KCl to 60 mEq daily (can take 20 TID or split it up however he likes). Pls update EPIC med list/send Rx if needed  He has been taking the 40meq.  He will bump dose to 60meq.  He wants to take them all at the same time.  Medication list updated in [facility] and pharmacy also updated.    Kidney function is totally normal! Hooray!  Update on weight (295# reported on 6/12)  Weight is fluctuating   6/15 297  6/16 300  6/17 299  6/18 303  Feels bloated.  Reports use to wear size 38 pants and is up to size 44  Breathing  Continues to have SOB with activity.  Reports he does recover quickly when he stops activity  Swelling  Continues to have SCOTT.  Bloated torso  BPs  6;14 124/79  6/15 132/80  6/16 124/94  6/17 142/83  6/18 129/89  8.  Needs to see me mid July (after Pulm appt 7/11) with BMP prior. Ordered. 11:30 over lunch on a rounding day is fine.   Labs (BMP) scheduled for 7/25 at 10:30am and follow up with Crystal Campos at 11:30am  Will update TOBI Rivas regarding above.   PRABHA Palomo

## 2024-06-18 NOTE — PROGRESS NOTES
Pls let Brayden know I reviewed blood work on torsemide 60, lsoatan 50, KCl 40 (increased from 20 mEq 6/12).    Did he increase KCl to 40 mEq on 6/12 as planned? His K dropped, which is surprising.   Regardless, pls increase KCl to 60 mEq daily (can take 20 TID or split it up however he likes). Pls update EPIC med list/send Rx if needed  Kidney function is totally normal! Hooray!  Update on weight (295# reported on 6/12)  Breathing  Swelling  BPs  8.  Needs to see me mid July (after Pulm appt 7/11) with BMP prior. Ordered. 11:30 over lunch on a rounding day is fine.     Component      Latest Ref Rng 5/28/2024  2:00 PM 6/3/2024  1:28 PM 6/11/2024  11:39 AM 6/18/2024  11:38 AM   Sodium      135 - 145 mmol/L 138  141  140  139    Potassium      3.4 - 5.3 mmol/L 3.9  3.6  3.4  3.3 (L)    Chloride      98 - 107 mmol/L 100  102  96 (L)  97 (L)    Carbon Dioxide (CO2)      22 - 29 mmol/L 27  26  30 (H)  30 (H)    Anion Gap      7 - 15 mmol/L 11  13  14  12    Urea Nitrogen      8.0 - 23.0 mg/dL 24.3 (H)  20.6  23.1 (H)  21.0    Creatinine      0.67 - 1.17 mg/dL 1.01  1.75 (H)  1.22 (H)  1.15    GFR Estimate      >60 mL/min/1.73m2 79  41 (L)  63  68    Calcium      8.8 - 10.2 mg/dL 8.8  8.4 (L)  8.2 (L)  8.8    Glucose      70 - 99 mg/dL 143 (H)  122 (H)  161 (H)  136 (H)           Reza Rojas  June 18, 2024 at 3:41 PM

## 2024-06-19 ENCOUNTER — HOSPITAL ENCOUNTER (OUTPATIENT)
Dept: CARDIAC REHAB | Facility: CLINIC | Age: 74
Discharge: HOME | End: 2024-06-19
Attending: INTERNAL MEDICINE
Payer: COMMERCIAL

## 2024-06-19 PROCEDURE — 93798 PHYS/QHP OP CAR RHAB W/ECG: CPT

## 2024-06-19 RX ORDER — POTASSIUM CHLORIDE 1500 MG/1
40 TABLET, EXTENDED RELEASE ORAL 2 TIMES DAILY
Status: CANCELLED
Start: 2024-06-19

## 2024-06-19 RX ORDER — METOLAZONE 2.5 MG/1
2.5 TABLET ORAL
Qty: 40 TABLET | Refills: 0 | Status: SHIPPED
Start: 2024-06-19 | End: 2024-06-27

## 2024-06-19 NOTE — PROGRESS NOTES
Spoke to patient to review recommendations per TOBI Rivas:  It sounds like he's really not doing well in terms of fluid, correct?  Patient feels he still has a lot of fluid on board (SCOTT and bloating)     Weight up to 303# (295# reported on 6/12) bloating, and increased edema. Glad breathing recovers quickly and BP is OK, however.        Given this, I think it is prudent that we continue to increase his diuretic.  I apologize for the multiple instructions, but was expecting him to be doing better from a symptom perspective when I reviewed his BMP.     1. Continue torsemide 60 mg in AM     2. Add Metolazone 2.5 mg Mondays/Wednesdays/Fridays in AM.   He'd been discharged on this from hospital at 2.5 mg daily (along with torsemide 80 in AM/40 noon) and got very hypotensive/ROBIN with Cr >3.0.    Pls assure him that he's now on a lower dose of torsemide (60 mg daily) and we're only doing metolazone 3d/week and will watch BMP closely  He likely has this from hospital discharge (I've gotten Rx otoniel'd up if needed)     3. Increase KCl from 60 mEq (just increased today) to 40 BID (Rx otoniel'd up if needed)     4. BMP mid-next week (I've ordered) ** Please watch for this in my inbox as I'll be gone next week! **  I've cc'd Shanthi on this who agreed to look at this when I'm out.    Medication list updated in epic.  Patient to schedule BMP at his PCP clinic for Wednesday,   will follow up with results.  PRABHA Palomo

## 2024-06-19 NOTE — PROGRESS NOTES
It sounds like he's really not doing well in terms of fluid, correct?    Weight up to 303# (295# reported on 6/12) bloating, and increased edema. Glad breathing recovers quickly and BP is OK, however.      Given this, I think it is prudent that we continue to increase his diuretic.  I apologize for the multiple instructions, but was expecting him to be doing better from a symptom perspective when I reviewed his BMP.    1. Continue torsemide 60 mg in AM    2. Add Metolazone 2.5 mg Mondays/Wednesdays/Fridays in AM.   He'd been discharged on this from hospital at 2.5 mg daily (along with torsemide 80 in AM/40 noon) and got very hypotensive/ROBIN with Cr >3.0.    Pls assure him that he's now on a lower dose of torsemide (60 mg daily) and we're only doing metolazone 3d/week and will watch BMP closely  He likely has this from hospital discharge (I've gotten Rx otoniel'd up if needed)    3. Increase KCl from 60 mEq (just increased today) to 40 BID (Rx otoniel'd up if needed)    4. BMP mid-next week (I've ordered) ** Please watch for this in my inbox as I'll be gone next week! **  I've cc'd Shanthi on this who agreed to look at this when I'm out.      THANK YOU!

## 2024-06-21 ENCOUNTER — HOSPITAL ENCOUNTER (OUTPATIENT)
Dept: CARDIAC REHAB | Facility: CLINIC | Age: 74
Discharge: HOME | End: 2024-06-21
Attending: INTERNAL MEDICINE
Payer: COMMERCIAL

## 2024-06-21 PROCEDURE — 93798 PHYS/QHP OP CAR RHAB W/ECG: CPT | Performed by: CLINICAL EXERCISE PHYSIOLOGIST

## 2024-06-27 ENCOUNTER — LAB (OUTPATIENT)
Dept: LAB | Facility: CLINIC | Age: 74
End: 2024-06-27
Payer: COMMERCIAL

## 2024-06-27 DIAGNOSIS — E87.6 HYPOKALEMIA: ICD-10-CM

## 2024-06-27 DIAGNOSIS — I10 ESSENTIAL HYPERTENSION: ICD-10-CM

## 2024-06-27 LAB
ANION GAP SERPL CALCULATED.3IONS-SCNC: 13 MMOL/L (ref 7–15)
BUN SERPL-MCNC: 30.6 MG/DL (ref 8–23)
CALCIUM SERPL-MCNC: 9 MG/DL (ref 8.8–10.2)
CHLORIDE SERPL-SCNC: 95 MMOL/L (ref 98–107)
CREAT SERPL-MCNC: 1.31 MG/DL (ref 0.67–1.17)
DEPRECATED HCO3 PLAS-SCNC: 31 MMOL/L (ref 22–29)
EGFRCR SERPLBLD CKD-EPI 2021: 58 ML/MIN/1.73M2
GLUCOSE SERPL-MCNC: 139 MG/DL (ref 70–99)
POTASSIUM SERPL-SCNC: 2.9 MMOL/L (ref 3.4–5.3)
SODIUM SERPL-SCNC: 139 MMOL/L (ref 135–145)

## 2024-06-27 PROCEDURE — 80048 BASIC METABOLIC PNL TOTAL CA: CPT

## 2024-06-27 PROCEDURE — 36415 COLL VENOUS BLD VENIPUNCTURE: CPT

## 2024-06-27 NOTE — PROGRESS NOTES
See if patient's weight is down with metolazone?  Stop metolazone . Currently taking KCL 40 mEq every day (was supposed to be on 60)  Take 80 mEq of potassium tonight and tomorrow morning.  Recheck potassium tomorrow afternoon and run stat.  I would recommend spironolactone in the future for HFpEF.  I think he was on 12.5 mg, but stopped for jump in creatinine. We can discuss further with Crystal.  Shanthi Wilkins           Spoke with pt had seen his labs and was aware that potassium was low at 2.9. Pt had only been taking potassium 40 mg daily instead of 60 mg as ordered. Pt has been asked to take 80 mg of potassium tonight and tomorrow AM. Pt will stop the Metolazone.  Pt will have a potassium check tomorrow afternoon and then will reassess as to how to proceed. Pt states understanding. K order placed and pt will have done at [facility]. Miguel Ángel

## 2024-06-27 NOTE — TELEPHONE ENCOUNTER
See if patient's weight is down with metolazone?  Stop metolazone . Currently taking KCL 40 mEq every day (was supposed to be on 60)  Take 80 mEq of potassium tonight and tomorrow morning.  Recheck potassium tomorrow afternoon and run stat.  I would recommend spironolactone in the future for HFpEF.  I think he was on 12.5 mg, but stopped for jump in creatinine. We can discuss further with Crystal.  Reza

## 2024-06-28 ENCOUNTER — LAB (OUTPATIENT)
Dept: LAB | Facility: CLINIC | Age: 74
End: 2024-06-28
Payer: COMMERCIAL

## 2024-06-28 ENCOUNTER — TELEPHONE (OUTPATIENT)
Dept: CARDIOLOGY | Facility: CLINIC | Age: 74
End: 2024-06-28

## 2024-06-28 DIAGNOSIS — I10 ESSENTIAL HYPERTENSION: ICD-10-CM

## 2024-06-28 DIAGNOSIS — E87.6 HYPOKALEMIA: ICD-10-CM

## 2024-06-28 DIAGNOSIS — E87.6 HYPOKALEMIA: Primary | ICD-10-CM

## 2024-06-28 LAB
ANION GAP SERPL CALCULATED.3IONS-SCNC: 16 MMOL/L (ref 7–15)
BUN SERPL-MCNC: 27.3 MG/DL (ref 8–23)
CALCIUM SERPL-MCNC: 9.1 MG/DL (ref 8.8–10.2)
CHLORIDE SERPL-SCNC: 93 MMOL/L (ref 98–107)
CREAT SERPL-MCNC: 1.25 MG/DL (ref 0.67–1.17)
DEPRECATED HCO3 PLAS-SCNC: 30 MMOL/L (ref 22–29)
EGFRCR SERPLBLD CKD-EPI 2021: 61 ML/MIN/1.73M2
GLUCOSE SERPL-MCNC: 167 MG/DL (ref 70–99)
POTASSIUM SERPL-SCNC: 3.3 MMOL/L (ref 3.4–5.3)
SODIUM SERPL-SCNC: 139 MMOL/L (ref 135–145)

## 2024-06-28 PROCEDURE — 80048 BASIC METABOLIC PNL TOTAL CA: CPT

## 2024-06-28 PROCEDURE — 36415 COLL VENOUS BLD VENIPUNCTURE: CPT

## 2024-06-28 NOTE — TELEPHONE ENCOUNTER
Spoke to Shanthi Wilkins to discuss pt K of 3.3 after 80 mg K for 2 doses.  Shanthi would like pt to take 80 mg tonight and on Saturday and Sunday take  80 mg daily.  Then on Monday will have pt check is labs again.   Pt repeated back the dose that he is taking tonight and this weekend. Pt states that he has enough tabs at this time.  Order has been placed and pt will schedule lab to be done.  Miguel Ángel

## 2024-07-01 ENCOUNTER — LAB (OUTPATIENT)
Dept: LAB | Facility: CLINIC | Age: 74
End: 2024-07-01
Payer: COMMERCIAL

## 2024-07-01 ENCOUNTER — DOCUMENTATION ONLY (OUTPATIENT)
Dept: CARDIOLOGY | Facility: CLINIC | Age: 74
End: 2024-07-01

## 2024-07-01 DIAGNOSIS — E87.6 HYPOKALEMIA: ICD-10-CM

## 2024-07-01 DIAGNOSIS — I10 BENIGN ESSENTIAL HYPERTENSION: ICD-10-CM

## 2024-07-01 DIAGNOSIS — I10 ESSENTIAL HYPERTENSION: Primary | ICD-10-CM

## 2024-07-01 LAB
ANION GAP SERPL CALCULATED.3IONS-SCNC: 12 MMOL/L (ref 7–15)
BUN SERPL-MCNC: 22.8 MG/DL (ref 8–23)
CALCIUM SERPL-MCNC: 8.9 MG/DL (ref 8.8–10.2)
CHLORIDE SERPL-SCNC: 91 MMOL/L (ref 98–107)
CREAT SERPL-MCNC: 1.26 MG/DL (ref 0.67–1.17)
DEPRECATED HCO3 PLAS-SCNC: 34 MMOL/L (ref 22–29)
EGFRCR SERPLBLD CKD-EPI 2021: 61 ML/MIN/1.73M2
GLUCOSE SERPL-MCNC: 141 MG/DL (ref 70–99)
POTASSIUM SERPL-SCNC: 3.1 MMOL/L (ref 3.4–5.3)
SODIUM SERPL-SCNC: 137 MMOL/L (ref 135–145)

## 2024-07-01 PROCEDURE — 36415 COLL VENOUS BLD VENIPUNCTURE: CPT | Performed by: NURSE PRACTITIONER

## 2024-07-01 PROCEDURE — 80048 BASIC METABOLIC PNL TOTAL CA: CPT | Performed by: NURSE PRACTITIONER

## 2024-07-01 RX ORDER — SPIRONOLACTONE 25 MG/1
TABLET ORAL
Qty: 45 TABLET | Refills: 1 | Status: SHIPPED | OUTPATIENT
Start: 2024-07-01 | End: 2024-07-09

## 2024-07-01 RX ORDER — POTASSIUM CHLORIDE 1500 MG/1
40 TABLET, EXTENDED RELEASE ORAL 2 TIMES DAILY
Qty: 270 TABLET | Refills: 2 | Status: ON HOLD | OUTPATIENT
Start: 2024-07-01 | End: 2024-07-24

## 2024-07-01 NOTE — PROGRESS NOTES
7/1/24 Msg recd from Crystal Campos PA-C    Reviewed BMP done again today.     K is still quite low despite very high K supplementation (KCl 40 mEq starting 6/12, increased to 40 mEq BID on 6/18 (but reported only taking 40 mEq daily when contacted 6/27), then given 80 mEq x 3 (6/27 PM, 6/28 AM, and 6/28 PM, then 80 mEq daily over weekend 6/29 and 6/30.  Today, 7/1 took only 20 mEq daily until blood work could be reviewed.     Confirm remains on torsemide 60 mg daily- Confirmed   Confirm metolazone 2.5 mg MWF stopped by Shanthi 6/27- confirmed   What is weight? Last I saw was 303# on 6/19. Weight today was 306#   What are BPs? 120-138/70-86 w HRS 80-90      PLAN:  Limit sodium <2300 mg, compression stockings, elevation for swelling  Limit fluid intake as Shanthi suggested (6 cups/48 oz each day)  Continue exercise as recommended by Shanthi (I think he's doing Pulm Rehab if I remember)  Continue torsemide 60 mg daily  Restart spironolactone 12.5 mg daily.  We'd tried this in the past but was stopped on discharge from hospital 2/2024 (I believe in favor of higher loop diuretic dosing)  This should help increase potassium and help get rid of some fluid in a different way that the torsemide does  Pls send in new Rx if he needs, but he may have this as he was on it earlier this year- rx sent to [facility] [place]      6. Increase KCl back to 80 mEq daily (can take 40 mEq BID)- pt stated he has enough, med list updated   7. BMP on Friday AM - I've ordered. When we contact with results will get update on:- pt will be going out of town ( > 100 miles away on Thursday through the weekend until Sunday). Will check inés Rojas on when BMP should be completed               Weight              Breathing              Swelling              BP ... If high enough, can increase spironolactone or increase losartan  8.  Keep appt with me 7/25 with labs     July 1, 2024 at 3:58 PM    Routing to Crystal Wilson  pm             Component      Latest Ref Rng 6/11/2024  11:39 AM 6/18/2024  11:38 AM 6/27/2024  10:07 AM 6/28/2024  1:34 PM 7/1/2024  12:00 PM   Sodium      135 - 145 mmol/L 140  139  139  139  137    Potassium      3.4 - 5.3 mmol/L 3.4  3.3 (L)  2.9 (L)  3.3 (L)  3.1 (L)    Chloride      98 - 107 mmol/L 96 (L)  97 (L)  95 (L)  93 (L)  91 (L)    Carbon Dioxide (CO2)      22 - 29 mmol/L 30 (H)  30 (H)  31 (H)  30 (H)  34 (H)    Anion Gap      7 - 15 mmol/L 14  12  13  16 (H)  12    Urea Nitrogen      8.0 - 23.0 mg/dL 23.1 (H)  21.0  30.6 (H)  27.3 (H)  22.8    Creatinine      0.67 - 1.17 mg/dL 1.22 (H)  1.15  1.31 (H)  1.25 (H)  1.26 (H)    GFR Estimate      >60 mL/min/1.73m2 63  68  58 (L)  61  61    Calcium      8.8 - 10.2 mg/dL 8.2 (L)  8.8  9.0  9.1  8.9    Glucose      70 - 99 mg/dL 161 (H)  136 (H)  139 (H)  167 (H)  141 (H)

## 2024-07-01 NOTE — PROGRESS NOTES
Reviewed BMP done again today.    K is still quite low despite very high K supplementation (KCl 40 mEq starting 6/12, increased to 40 mEq BID on 6/18 (but reported only taking 40 mEq daily when contacted 6/27), then given 80 mEq x 3 (6/27 PM, 6/28 AM, and 6/28 PM, then 80 mEq daily over weekend 6/29 and 6/30.  Today, 7/1 took only 20 mEq daily until blood work could be reviewed.    Confirm remains on torsemide 60 mg daily  Confirm metolazone 2.5 mg MWF stopped by Shanthi 6/27  What is weight? Last I saw was 303# on 6/19.  What are BPs?     PLAN:  Limit sodium <2300 mg, compression stockings, elevation for swelling  Limit fluid intake as Shanthi suggested (6 cups/48 oz each day)  Continue exercise as recommended by Shanthi (I think he's doing Pulm Rehab if I remember)  Continue torsemide 60 mg daily  Restart spironolactone 12.5 mg daily.  We'd tried this in the past but was stopped on discharge from hospital 2/2024 (I believe in favor of higher loop diuretic dosing)  This should help increase potassium and help get rid of some fluid in a different way that the torsemide does  Pls send in new Rx if he needs, but he may have this as he was on it earlier this year    6. Increase KCl back to 80 mEq daily (can take 40 mEq BID)  7. BMP on Friday AM - I've ordered. When we contact with results will get update on:   Weight   Breathing   Swelling   BP ... If high enough, can increase spironolactone or increase losartan  8.  Keep appt with me 7/25 with labs    July 1, 2024 at 3:58 PM        Component      Latest Ref Rng 6/11/2024  11:39 AM 6/18/2024  11:38 AM 6/27/2024  10:07 AM 6/28/2024  1:34 PM 7/1/2024  12:00 PM   Sodium      135 - 145 mmol/L 140  139  139  139  137    Potassium      3.4 - 5.3 mmol/L 3.4  3.3 (L)  2.9 (L)  3.3 (L)  3.1 (L)    Chloride      98 - 107 mmol/L 96 (L)  97 (L)  95 (L)  93 (L)  91 (L)    Carbon Dioxide (CO2)      22 - 29 mmol/L 30 (H)  30 (H)  31 (H)  30 (H)  34 (H)    Anion Gap      7 - 15 mmol/L 14  12  13   16 (H)  12    Urea Nitrogen      8.0 - 23.0 mg/dL 23.1 (H)  21.0  30.6 (H)  27.3 (H)  22.8    Creatinine      0.67 - 1.17 mg/dL 1.22 (H)  1.15  1.31 (H)  1.25 (H)  1.26 (H)    GFR Estimate      >60 mL/min/1.73m2 63  68  58 (L)  61  61    Calcium      8.8 - 10.2 mg/dL 8.2 (L)  8.8  9.0  9.1  8.9    Glucose      70 - 99 mg/dL 161 (H)  136 (H)  139 (H)  167 (H)  141 (H)

## 2024-07-02 NOTE — PROGRESS NOTES
"Thanks for update.     Please have him recheck BMP on Monday when he returns    I hope he has a great time - he needs a break!!  When he is out of town, needs to be very cognizant of how much sodium he is eating ... Most \"4th of July foods\" have lots of salt (hot dogs, chips, etc).    THANKS!    Crystal  July 2, 2024 at 6:51 AM    "

## 2024-07-02 NOTE — PROGRESS NOTES
"Spoke to patient regarding recommendations per TOBI Rivas:  Please have him recheck BMP on Monday when he returns   labs scheduled for 7/8 at 11am.       I hope he has a great time - he needs a break!!  When he is out of town, needs to be very cognizant of how much sodium he is eating ... Most \"4th of July foods\" have lots of salt (hot dogs, chips, etc).    Patient provided verbal understanding regarding above.  PRABHA Palomo  "

## 2024-07-08 ENCOUNTER — DOCUMENTATION ONLY (OUTPATIENT)
Dept: CARDIOLOGY | Facility: CLINIC | Age: 74
End: 2024-07-08

## 2024-07-08 ENCOUNTER — LAB (OUTPATIENT)
Dept: LAB | Facility: CLINIC | Age: 74
End: 2024-07-08
Payer: COMMERCIAL

## 2024-07-08 DIAGNOSIS — R60.0 LOWER EXTREMITY EDEMA: ICD-10-CM

## 2024-07-08 DIAGNOSIS — I10 ESSENTIAL HYPERTENSION: ICD-10-CM

## 2024-07-08 DIAGNOSIS — E87.6 HYPOKALEMIA: ICD-10-CM

## 2024-07-08 DIAGNOSIS — I48.19 PERSISTENT ATRIAL FIBRILLATION (H): ICD-10-CM

## 2024-07-08 DIAGNOSIS — E87.6 HYPOKALEMIA: Primary | ICD-10-CM

## 2024-07-08 LAB
ANION GAP SERPL CALCULATED.3IONS-SCNC: 15 MMOL/L (ref 7–15)
BUN SERPL-MCNC: 31.5 MG/DL (ref 8–23)
CALCIUM SERPL-MCNC: 9.3 MG/DL (ref 8.8–10.2)
CHLORIDE SERPL-SCNC: 93 MMOL/L (ref 98–107)
CREAT SERPL-MCNC: 1.97 MG/DL (ref 0.67–1.17)
DEPRECATED HCO3 PLAS-SCNC: 29 MMOL/L (ref 22–29)
EGFRCR SERPLBLD CKD-EPI 2021: 35 ML/MIN/1.73M2
GLUCOSE SERPL-MCNC: 182 MG/DL (ref 70–99)
POTASSIUM SERPL-SCNC: 3.9 MMOL/L (ref 3.4–5.3)
SODIUM SERPL-SCNC: 137 MMOL/L (ref 135–145)

## 2024-07-08 PROCEDURE — 80048 BASIC METABOLIC PNL TOTAL CA: CPT | Performed by: PHYSICIAN ASSISTANT

## 2024-07-08 PROCEDURE — 36415 COLL VENOUS BLD VENIPUNCTURE: CPT | Performed by: PHYSICIAN ASSISTANT

## 2024-07-08 NOTE — PROGRESS NOTES
Please call Brayden tomorrow and let him know I reviewed blood work done today    CONFIRM on:  Torsemide 60 mg daily  Spironolactone 12.5 mg daily  Potassium chloride 80 mEq daily (or 40 BID)    CHECK:  1.  What his weight?  306# on phone call 7/1  2.  How is breathing?  Any improvement on spironolactone?  3.  How is swelling?  4.  Was he able to follow a low-sodium diet on his holiday?  5.  What are O2s running?      Electrolytes finally look okay, with potassium to 3.9.  Creatinine is climbing, not surprising    No changes until I hear about the weight.  I would like to reduce his torsemide and continue him on spironolactone and may be torsemide 40, but want to see how he is feeling (we will send in torsemide Rx, which he requested via [employer]) once we hear back    Component      Latest Ref Rng 6/18/2024  11:38 AM 6/27/2024  10:07 AM 6/28/2024  1:34 PM 7/1/2024  12:00 PM 7/8/2024  10:56 AM   Sodium      135 - 145 mmol/L 139  139  139  137  137    Potassium      3.4 - 5.3 mmol/L 3.3 (L)  2.9 (L)  3.3 (L)  3.1 (L)  3.9    Chloride      98 - 107 mmol/L 97 (L)  95 (L)  93 (L)  91 (L)  93 (L)    Carbon Dioxide (CO2)      22 - 29 mmol/L 30 (H)  31 (H)  30 (H)  34 (H)  29    Anion Gap      7 - 15 mmol/L 12  13  16 (H)  12  15    Urea Nitrogen      8.0 - 23.0 mg/dL 21.0  30.6 (H)  27.3 (H)  22.8  31.5 (H)    Creatinine      0.67 - 1.17 mg/dL 1.15  1.31 (H)  1.25 (H)  1.26 (H)  1.97 (H)    GFR Estimate      >60 mL/min/1.73m2 68  58 (L)  61  61  35 (L)    Calcium      8.8 - 10.2 mg/dL 8.8  9.0  9.1  8.9  9.3    Glucose      70 - 99 mg/dL 136 (H)  139 (H)  167 (H)  141 (H)  182 (H)         Reza Rojas

## 2024-07-09 ENCOUNTER — MYC MEDICAL ADVICE (OUTPATIENT)
Dept: CARDIOLOGY | Facility: CLINIC | Age: 74
End: 2024-07-09
Payer: COMMERCIAL

## 2024-07-09 RX ORDER — TORSEMIDE 20 MG/1
60 TABLET ORAL DAILY
Qty: 270 TABLET | Refills: 2 | Status: ON HOLD | OUTPATIENT
Start: 2024-07-09 | End: 2024-07-24

## 2024-07-09 RX ORDER — SPIRONOLACTONE 25 MG/1
25 TABLET ORAL DAILY
Qty: 90 TABLET | Refills: 2 | Status: ON HOLD | OUTPATIENT
Start: 2024-07-09 | End: 2024-09-06

## 2024-07-09 NOTE — TELEPHONE ENCOUNTER
Spoke to patient to review recommendations per TOBI Rivas:  Glad weight is no longer climbing - 306#      Increase spironolactone to 25 mg daily  BMP Tues/Wed of next week. I've ordered.  At that time, will decide if we need to change torsemide dose     3.  Continue all other meds. Pls send in Rx refill for torsemide  4.  I was disappointed to see his Pulm follow-up got moved to 8/2024 as we were hoping to know lung function progress before I saw him in later     Medication list updated in epic.  Scripts sent to the pharmacy.  Patient requesting 90 day supply.  Patient to have follow up lab on 7/17 at 9:30am.  Patient provided verbal understanding regarding above.  PRABHA Palomo

## 2024-07-09 NOTE — PROGRESS NOTES
Glad weight is no longer climbing - 306#     Increase spironolactone to 25 mg daily  BMP Tues/Wed of next week. I've ordered.  At that time, will decide if we need to change torsemide dose    3.  Continue all other meds. Pls send in Rx refill for torsemide  4.  I was disappointed to see his Pulm follow-up got moved to 8/2024 as we were hoping to know lung function progress before I saw him in later July.      Crystal  July 9, 2024 at 12:48 PM

## 2024-07-09 NOTE — PROGRESS NOTES
Spoke to patient to review recommendations per TOBI Rivas and to get update on symptoms:       CONFIRM on:  Torsemide 60 mg daily  Spironolactone 12.5 mg daily  Potassium chloride 80 mEq daily (or 40 BID)  Confirmed with patient the above medications are correct  CHECK:  1.  What his weight?  306# on phone call 7/1   Weight 7/1 309, 7/2 309 and 7/8 306  2.  How is breathing?  Any improvement on spironolactone?  Patient reports no changes in breathing  3.  How is swelling?  SCOTT continues and legs are achy from the swelling    4.  Was he able to follow a low-sodium diet on his holiday?   Reports he is very aware of monitoring his sodium and did follow and monitor his sodium  5.  What are O2s running?  He has misplaced his oximeter so has not been checking this.  He did indicate when he exerts himself he knows it drops into the 80's however he does recover quickly with rest.        Electrolytes finally look okay, with potassium to 3.9.  Creatinine is climbing, not surprising     No changes until I hear about the weight.  I would like to reduce his torsemide and continue him on spironolactone and may be torsemide 40, but want to see how he is feeling (we will send in    Will update TOBI Rivas regarding above.  PRABHA Palomo

## 2024-07-10 NOTE — TELEPHONE ENCOUNTER
Thanks for update.  Please make sure that his torsemide was called in (msg sent yesterday)    Reza Rojas

## 2024-07-15 ENCOUNTER — HOSPITAL ENCOUNTER (OUTPATIENT)
Dept: CARDIAC REHAB | Facility: CLINIC | Age: 74
Discharge: HOME | End: 2024-07-15
Attending: INTERNAL MEDICINE
Payer: COMMERCIAL

## 2024-07-15 PROCEDURE — 93798 PHYS/QHP OP CAR RHAB W/ECG: CPT | Performed by: OCCUPATIONAL THERAPIST

## 2024-07-17 ENCOUNTER — LAB (OUTPATIENT)
Dept: LAB | Facility: CLINIC | Age: 74
End: 2024-07-17
Payer: COMMERCIAL

## 2024-07-17 DIAGNOSIS — I10 ESSENTIAL HYPERTENSION: ICD-10-CM

## 2024-07-17 DIAGNOSIS — E87.6 HYPOKALEMIA: ICD-10-CM

## 2024-07-17 LAB
ANION GAP SERPL CALCULATED.3IONS-SCNC: 14 MMOL/L (ref 7–15)
BUN SERPL-MCNC: 39.1 MG/DL (ref 8–23)
CALCIUM SERPL-MCNC: 9.2 MG/DL (ref 8.8–10.4)
CHLORIDE SERPL-SCNC: 94 MMOL/L (ref 98–107)
CREAT SERPL-MCNC: 1.95 MG/DL (ref 0.67–1.17)
EGFRCR SERPLBLD CKD-EPI 2021: 36 ML/MIN/1.73M2
GLUCOSE SERPL-MCNC: 124 MG/DL (ref 70–99)
HCO3 SERPL-SCNC: 28 MMOL/L (ref 22–29)
POTASSIUM SERPL-SCNC: 4 MMOL/L (ref 3.4–5.3)
SODIUM SERPL-SCNC: 136 MMOL/L (ref 135–145)

## 2024-07-17 PROCEDURE — 80048 BASIC METABOLIC PNL TOTAL CA: CPT | Performed by: PHYSICIAN ASSISTANT

## 2024-07-17 PROCEDURE — 36415 COLL VENOUS BLD VENIPUNCTURE: CPT | Performed by: PHYSICIAN ASSISTANT

## 2024-07-18 ENCOUNTER — MYC MEDICAL ADVICE (OUTPATIENT)
Dept: PULMONOLOGY | Facility: CLINIC | Age: 74
End: 2024-07-18
Payer: COMMERCIAL

## 2024-07-18 ENCOUNTER — DOCUMENTATION ONLY (OUTPATIENT)
Dept: CARDIOLOGY | Facility: CLINIC | Age: 74
End: 2024-07-18
Payer: COMMERCIAL

## 2024-07-18 DIAGNOSIS — I50.9 ACUTE ON CHRONIC CONGESTIVE HEART FAILURE, UNSPECIFIED HEART FAILURE TYPE (H): Primary | ICD-10-CM

## 2024-07-19 NOTE — PROGRESS NOTES
Spoke to patient who states his weight this morning is 309, but that after his Torsemide 60 mg  and Spironolactone 25 mg in the AM his weight drops to 305-306.  Pt also continues to take KCL 80 meq daily.  Pt states that his breathing has not changed and that his swelling is also about the same. States the socks help, but the backs of his legs are still very sore. At this time because pt is not worse, will leave medications as is and make Crystal aware.  Pt wants his CT scan moved up and this writer will look into what CT pt is having.  Ct is for pt lungs and being done at the , unsure if this can be moved up, but will check. Miguel Ángel

## 2024-07-19 NOTE — PROGRESS NOTES
"Pls call Brayden know that I reviewed blood work which was stable after increasing spironolactone to 25 mg daily on 7/8.    PLS ASK:  On 7/8, weight was stable 306# - what is it now?  Confirm torsemide 60, spironolactone 25, KCL 80 mEq (or 40 BID).  Any change in breathing? Any change in swelling?    PLAN:  If fluid status is improving (improved swelling, drop in weight), please continue current meds. Cr is stable, but still not \"normal\" for him, so don't want to push the torsemide if we don't have to  2.  If fluid status is worsening (swelling, weight gain despite limiting salt), continue spironolactone 25, increase torsemide to 80 mg daily and increase KCl to 100 mEq daily     3. See me 7/25 with labs as planned.    HKUSHBU - Crystal      Component      Latest Ref Rng 6/28/2024  1:34 PM 7/1/2024  12:00 PM 7/8/2024  10:56 AM 7/17/2024  9:22 AM   Sodium      135 - 145 mmol/L 139  137  137  136    Potassium      3.4 - 5.3 mmol/L 3.3 (L)  3.1 (L)  3.9  4.0    Chloride      98 - 107 mmol/L 93 (L)  91 (L)  93 (L)  94 (L)    Carbon Dioxide (CO2)      22 - 29 mmol/L 30 (H)  34 (H)  29  28    Anion Gap      7 - 15 mmol/L 16 (H)  12  15  14    Urea Nitrogen      8.0 - 23.0 mg/dL 27.3 (H)  22.8  31.5 (H)  39.1 (H)    Creatinine      0.67 - 1.17 mg/dL 1.25 (H)  1.26 (H)  1.97 (H)  1.95 (H)    GFR Estimate      >60 mL/min/1.73m2 61  61  35 (L)  36 (L)    Calcium      8.8 - 10.4 mg/dL 9.1  8.9  9.3  9.2    Glucose      70 - 99 mg/dL 167 (H)  141 (H)  182 (H)  124 (H)       "

## 2024-07-24 ENCOUNTER — HOSPITAL ENCOUNTER (OUTPATIENT)
Facility: CLINIC | Age: 74
Discharge: HOME | DRG: 286 | End: 2024-07-24
Admitting: INTERNAL MEDICINE
Payer: COMMERCIAL

## 2024-07-24 ENCOUNTER — APPOINTMENT (OUTPATIENT)
Dept: INFUSION THERAPY | Facility: CLINIC | Age: 74
DRG: 286 | End: 2024-07-24
Payer: COMMERCIAL

## 2024-07-24 VITALS
SYSTOLIC BLOOD PRESSURE: 118 MMHG | HEIGHT: 68 IN | DIASTOLIC BLOOD PRESSURE: 83 MMHG | OXYGEN SATURATION: 89 % | WEIGHT: 310.63 LBS | TEMPERATURE: 97.9 F | HEART RATE: 81 BPM | RESPIRATION RATE: 18 BRPM | BODY MASS INDEX: 47.08 KG/M2

## 2024-07-24 DIAGNOSIS — I48.19 PERSISTENT ATRIAL FIBRILLATION (H): ICD-10-CM

## 2024-07-24 DIAGNOSIS — I10 ESSENTIAL HYPERTENSION: ICD-10-CM

## 2024-07-24 DIAGNOSIS — I50.33 ACUTE ON CHRONIC HEART FAILURE WITH PRESERVED EJECTION FRACTION (HFPEF) (H): Primary | ICD-10-CM

## 2024-07-24 DIAGNOSIS — E87.6 HYPOKALEMIA: ICD-10-CM

## 2024-07-24 DIAGNOSIS — I10 BENIGN ESSENTIAL HYPERTENSION: ICD-10-CM

## 2024-07-24 DIAGNOSIS — R60.0 LOWER EXTREMITY EDEMA: ICD-10-CM

## 2024-07-24 LAB
BUN SERPL-MCNC: 33.4 MG/DL (ref 8–23)
BUN SERPL-MCNC: 33.7 MG/DL (ref 8–23)
CREAT SERPL-MCNC: 1.53 MG/DL (ref 0.67–1.17)
CREAT SERPL-MCNC: 1.56 MG/DL (ref 0.67–1.17)
EGFRCR SERPLBLD CKD-EPI 2021: 47 ML/MIN/1.73M2
EGFRCR SERPLBLD CKD-EPI 2021: 48 ML/MIN/1.73M2
MAGNESIUM SERPL-MCNC: 1.6 MG/DL (ref 1.7–2.3)
MAGNESIUM SERPL-MCNC: 2 MG/DL (ref 1.7–2.3)
POTASSIUM SERPL-SCNC: 3.5 MMOL/L (ref 3.4–5.3)
POTASSIUM SERPL-SCNC: 3.8 MMOL/L (ref 3.4–5.3)
SODIUM SERPL-SCNC: 136 MMOL/L (ref 135–145)
SODIUM SERPL-SCNC: 136 MMOL/L (ref 135–145)

## 2024-07-24 PROCEDURE — 36415 COLL VENOUS BLD VENIPUNCTURE: CPT | Performed by: PHYSICIAN ASSISTANT

## 2024-07-24 PROCEDURE — 82565 ASSAY OF CREATININE: CPT

## 2024-07-24 PROCEDURE — 250N000011 HC RX IP 250 OP 636: Performed by: NURSE PRACTITIONER

## 2024-07-24 PROCEDURE — 99215 OFFICE O/P EST HI 40 MIN: CPT | Performed by: NURSE PRACTITIONER

## 2024-07-24 PROCEDURE — 258N000003 HC RX IP 258 OP 636: Performed by: PHYSICIAN ASSISTANT

## 2024-07-24 PROCEDURE — 96376 TX/PRO/DX INJ SAME DRUG ADON: CPT

## 2024-07-24 PROCEDURE — 250N000013 HC RX MED GY IP 250 OP 250 PS 637: Performed by: NURSE PRACTITIONER

## 2024-07-24 PROCEDURE — 84295 ASSAY OF SERUM SODIUM: CPT

## 2024-07-24 PROCEDURE — 82565 ASSAY OF CREATININE: CPT | Performed by: PHYSICIAN ASSISTANT

## 2024-07-24 PROCEDURE — 96368 THER/DIAG CONCURRENT INF: CPT

## 2024-07-24 PROCEDURE — 250N000013 HC RX MED GY IP 250 OP 250 PS 637

## 2024-07-24 PROCEDURE — 96366 THER/PROPH/DIAG IV INF ADDON: CPT

## 2024-07-24 PROCEDURE — 84132 ASSAY OF SERUM POTASSIUM: CPT | Performed by: PHYSICIAN ASSISTANT

## 2024-07-24 PROCEDURE — 36415 COLL VENOUS BLD VENIPUNCTURE: CPT

## 2024-07-24 PROCEDURE — 96375 TX/PRO/DX INJ NEW DRUG ADDON: CPT

## 2024-07-24 PROCEDURE — 84520 ASSAY OF UREA NITROGEN: CPT

## 2024-07-24 PROCEDURE — 83735 ASSAY OF MAGNESIUM: CPT

## 2024-07-24 PROCEDURE — 999N000087 HC STATISTIC IV OP-OVERFLOW

## 2024-07-24 PROCEDURE — 96374 THER/PROPH/DIAG INJ IV PUSH: CPT

## 2024-07-24 PROCEDURE — 250N000009 HC RX 250: Performed by: PHYSICIAN ASSISTANT

## 2024-07-24 PROCEDURE — 96365 THER/PROPH/DIAG IV INF INIT: CPT

## 2024-07-24 PROCEDURE — 250N000011 HC RX IP 250 OP 636: Performed by: PHYSICIAN ASSISTANT

## 2024-07-24 PROCEDURE — 84520 ASSAY OF UREA NITROGEN: CPT | Performed by: PHYSICIAN ASSISTANT

## 2024-07-24 PROCEDURE — 84132 ASSAY OF SERUM POTASSIUM: CPT

## 2024-07-24 PROCEDURE — 96367 TX/PROPH/DG ADDL SEQ IV INF: CPT

## 2024-07-24 PROCEDURE — 84295 ASSAY OF SERUM SODIUM: CPT | Performed by: PHYSICIAN ASSISTANT

## 2024-07-24 PROCEDURE — 83735 ASSAY OF MAGNESIUM: CPT | Performed by: PHYSICIAN ASSISTANT

## 2024-07-24 RX ORDER — LIDOCAINE 40 MG/G
CREAM TOPICAL
Status: DISCONTINUED | OUTPATIENT
Start: 2024-07-24 | End: 2024-07-24 | Stop reason: HOSPADM

## 2024-07-24 RX ORDER — TORSEMIDE 20 MG/1
80 TABLET ORAL DAILY
Qty: 360 TABLET | Refills: 2 | Status: ON HOLD | OUTPATIENT
Start: 2024-07-24 | End: 2024-08-09

## 2024-07-24 RX ORDER — POTASSIUM CHLORIDE 1500 MG/1
60 TABLET, EXTENDED RELEASE ORAL 2 TIMES DAILY
Qty: 270 TABLET | Refills: 2 | Status: ON HOLD | OUTPATIENT
Start: 2024-07-24 | End: 2024-08-09

## 2024-07-24 RX ORDER — POTASSIUM CHLORIDE 1500 MG/1
40 TABLET, EXTENDED RELEASE ORAL ONCE
Status: COMPLETED | OUTPATIENT
Start: 2024-07-24 | End: 2024-07-24

## 2024-07-24 RX ORDER — MAGNESIUM SULFATE HEPTAHYDRATE 40 MG/ML
2 INJECTION, SOLUTION INTRAVENOUS ONCE
Status: COMPLETED | OUTPATIENT
Start: 2024-07-24 | End: 2024-07-24

## 2024-07-24 RX ORDER — POTASSIUM CHLORIDE 1500 MG/1
20 TABLET, EXTENDED RELEASE ORAL ONCE
Status: COMPLETED | OUTPATIENT
Start: 2024-07-24 | End: 2024-07-24

## 2024-07-24 RX ORDER — SODIUM CHLORIDE 9 MG/ML
INJECTION, SOLUTION INTRAVENOUS CONTINUOUS
Status: DISCONTINUED | OUTPATIENT
Start: 2024-07-24 | End: 2024-07-24 | Stop reason: HOSPADM

## 2024-07-24 RX ORDER — BUMETANIDE 0.25 MG/ML
2 INJECTION INTRAMUSCULAR; INTRAVENOUS ONCE
Status: COMPLETED | OUTPATIENT
Start: 2024-07-24 | End: 2024-07-24

## 2024-07-24 RX ORDER — CHLOROTHIAZIDE SODIUM 500 MG/1
250 INJECTION INTRAVENOUS
Status: COMPLETED | OUTPATIENT
Start: 2024-07-24 | End: 2024-07-24

## 2024-07-24 RX ADMIN — POTASSIUM CHLORIDE 20 MEQ: 1500 TABLET, EXTENDED RELEASE ORAL at 12:33

## 2024-07-24 RX ADMIN — SODIUM CHLORIDE: 9 INJECTION, SOLUTION INTRAVENOUS at 11:19

## 2024-07-24 RX ADMIN — CHLOROTHIAZIDE SODIUM 250 MG: 500 INJECTION, POWDER, LYOPHILIZED, FOR SOLUTION INTRAVENOUS at 15:39

## 2024-07-24 RX ADMIN — MAGNESIUM SULFATE HEPTAHYDRATE 2 G: 40 INJECTION, SOLUTION INTRAVENOUS at 12:49

## 2024-07-24 RX ADMIN — BUMETANIDE 0.5 MG/HR: 0.25 INJECTION INTRAMUSCULAR; INTRAVENOUS at 12:27

## 2024-07-24 RX ADMIN — POTASSIUM CHLORIDE 40 MEQ: 1500 TABLET, EXTENDED RELEASE ORAL at 15:16

## 2024-07-24 RX ADMIN — BUMETANIDE 2 MG: 0.25 INJECTION INTRAMUSCULAR; INTRAVENOUS at 11:18

## 2024-07-24 ASSESSMENT — ACTIVITIES OF DAILY LIVING (ADL)
ADLS_ACUITY_SCORE: 37

## 2024-07-24 NOTE — PROGRESS NOTES
[facility]    I had the pleasure of seeing Brayden when he came for follow up of AFib and HF.  This 73 year old sees Dr. Day for his history of:    1. H/o persistent AFib. EF 50-55% 1/2022 - first dx'd in 2012. Failed flecainide (and noted scar on MRI). Underwent PVI and SVC isolation 11/2017. Repeat PVI and CTI for inducible AFlutter 4/2019. Started on Amio with DCCV 11/19/2020 for recurrent AFib in setting of increased alcohol use.   Amiodarone decreased 1/2021; back in persistent AFib 12/2021 and amiodarone stopped as felt asx'c.  This was restarted 8/2022 prior to DCCV 8/22/2022. Amiodarone stopped again 1/2023. AFib restarted 7/2023 after EtOH use, now s/p DCCV (unsuccessful 9/13), then loaded with amiodarone s/p successful DCCV 10/6/2023.  Dr. Day recommended repeat AFib ablation if he started having AFib without it being triggered by EtOH. Amiodarone stopped 11/2023 d/t discovery of pneumonitis.  Recurrent AF noted after stopping amiodarone, now s/p repeat ablation isolation of posterior LA with linear ablation, reisolation of reconnected PVI, and empiric CTI ablation for inducible atrial flutter) with Dr. Day 5/2/2024.  Unfortunately, hospitalized in follow-up for acute respiratory failure, which did not seem to respond to diuresis and pulmonology restarted high dose steroid therapy due to relapse of inflammatory process.  2. Moderate Aortic Stenosis  - per TAVR Team, still moderate so no intervention at this point (5/2024)  3. Benign Essential HTN   4. BOWEN  - on CPAP  5. Myocardiac Scar noted on cardiac MRI 10/2017, consistent with prior MI in the left Cx territory. EF 51%.  Avoiding Class Ic AAD use  6. H/o Alcohol dependence - no EtOH since 2017, then again 3-4 months in early 2022 after he was noted to be back in AFib.Back in AFib with EtOH use 7/2023  7. Chronic AC  On Xarelto for CHADSVASc 4 (HFpEF, HTN, age, aortic plaquing)  8. Bell's Palsy  9. Lung nodules- noted on CTA to  follow-up on aorta 8/2023.  Last CT 3/2024 showed improvement but not complete resolution of pulmonary nodules on CT.  Sees Dr. Mckeon  10. HFpEF - hospitalized 2/2024 and following ablation 5/2024 for recurrent HFpEF in setting of persistent AF, prednisone use, moderate aortic stenosis.      Last Visit & Interval History:  I saw Brayden 5/2024 and we reviewed hospitalization following his ablation for recurrent HFpEF and relapse of inflammatory process with bilateral pulmonary infiltrates.  He had been discharged 5/16 @ 278# on 1 L O2 on metolazone 2.5 daily, prednisone taper, metoprolol tartrate 12.5  BID, losartan 50, KCl 40  BID, Xarelto 20, and torsemide 80 AM/40PM.  Follow-up blood work just a few days later 5/21 showed hypokalemia 3.3 and creatinine that jumped from 1.21 discharged after 3.1.  This resulted in many medication holds and when I saw him 5/24, he was feeling a bit better.  Unfortunately, he has continued to complain of significant weight gain (~309#) despite increasing doses of torsemide, and addition of spironolactone. He remains on prednisone and unfortunately, his pulmonology follow-up with CT was moved from last week to 8/2024.    He underwent diuresis yesterday with the infusion clinic 7/23/2024 given weight 312# with increasing DELEON/edema.  At that time, on torsemide 60, spironolactone 25, KCl 40 mEq BID, losartan 50 mg daily, and metoprolol tartrate 12.5 mg BID.  He was given Bumex 2 mg IVP, then 0.5 mg/h x 4 h and Diuril 250 mg a few hours later. When he was discharged, weight was 310# but Marcos suspected he would likely continue to diuresis overnight given he had not had much urine output while there. Torsemide was increased to 80 mg daily and KCl increased to 60 BID. Repeat BMP/NTpBNP ready for next week.    He remains in AF, which is typically a trigger for his HFpEF, which has been difficult to treat given inability to use amiodarone (pneumonitis requiring high-dose prednisone), and scar  "on MRI.  Dr. Day notes consideration of hospitalization for dofetilide/DCCV once his fluid status gets under better control.      Today's Visit:  Unfortunately, Brayden feels no better after his infusion clinic yesterday (all told, received 4 mg Bumex and Diuril 250 mg).  In fact, weight actually went up to 313 today, and he has not really had a significant diuretic response.    He continues to note shortness of breath, significant DELEON, orthopnea and edema making it difficult to walk.  We again discussed that his HFpEF is multifactorial, due to recurrent AF, chronic prednisone use for pneumonitis, and obesity/deconditioning (as he is not been able to do anything for the last few months because of shortness of breath).    Currently, he remains on his prednisone taper.  He is taking 10 mg daily now, and anticipates cutting this to 5 mg daily in the coming weeks.  His pulmonology appointment and CT scan were moved to 8/2024 so I am not sure that his current status of pneumonitis.    He increased his torsemide to 80 mg daily and KCl 60 BID starting today (increased from torsemide 60 mg daily and KCl 40 BID during infusion clinic yesterday).  He remains on spironolactone 25 mg daily, metoprolol tartrate 12.5 mg BID and losartan 50 mg daily.  BMP done before our visit is noted below and stable.       VITALS:  Vitals: /88 (BP Location: Right arm, Patient Position: Sitting)   Pulse 79   Ht 1.727 m (5' 8\")   Wt 142 kg (313 lb 1.6 oz)   SpO2 95%   BMI 47.61 kg/m      Diagnostic Testing:  EKG 5/22/2024 AFib 84 bpm on metoprolol tartrate 12.5 mg BID  Echo 5/6/2024 LVEF 55-60%.  Mild LVH.  Moderate aortic stenosis with mean gradient 35 mmHg.  G3 DD  Nuclear stress test 5/8/2024 small area of mild ischemia in apical segments, with small area of nontransmural infarct in basal inferolateral region with mild degree of iva-infarct ischemia in basal/mid inferior wall.  Thought this was possibly due to body habitus  LVEF 55% " with G3 DD.  No obvious RWMA.  Normal RV function.  Severe LAE.  Mild MAC with 1+ MR.  Heavily calcified aortic valve with 1+ AI and moderate aortic stenosis.  V-max 3.7 m/s, mean gradient 35 mmHg, MARILYN 1.1 cm , DI 0.26, SVI 44 cc/m .  Ascending aortic dilatation 4.0 cm  Echocardiogram 2/11/2024 LVEF 55-60%.  Normal RV.  Moderate CHARBEL.  Mild MAC.  1+ MR.  Moderate aortic stenosis.  Ascending aortic dilatation (4.0 cm)   ZioPatch 11/24-25/2023 (stopped early d/t hospitalization) NSR 71 bpm. Occasional ectopy. No AFIb seen.  Echo 9/6/2023 showed LVEF 49%. Nl RV. Nl LA size. 1+MR. Mod AoS with mean gradient 16mmHg, Vmax 2.7 m/s, DI 0.31  Aorta 8/2023 with aneuryamsl dilation of suprarenal aorta (3.6x3.8). follow-up CTA showed no aneurysm  EKG 7/2021 showed sinus bradycardia 45 bpm.  Nonspecific ST-T wave changes  Cardiac MRI 10/2017 showed an EF of 51% with mild hypokinesis involving the basa inferolateral segment  Component      Latest Ref Rng 7/17/2024  9:22 AM 7/24/2024  11:41 AM 7/24/2024  3:07 PM 7/25/2024  10:48 AM   Sodium      135 - 145 mmol/L 136  136  136  137    Potassium      3.4 - 5.3 mmol/L 4.0  3.5  3.8  3.5    Chloride      98 - 107 mmol/L 94 (L)    96 (L)    Carbon Dioxide (CO2)      22 - 29 mmol/L 28    32 (H)    Anion Gap      7 - 15 mmol/L 14    9    Urea Nitrogen      8.0 - 23.0 mg/dL 39.1 (H)  33.7 (H)  33.4 (H)  31.1 (H)    Creatinine      0.67 - 1.17 mg/dL 1.95 (H)  1.53 (H)  1.56 (H)  1.56 (H)    GFR Estimate      >60 mL/min/1.73m2 36 (L)  48 (L)  47 (L)  47 (L)    Calcium      8.8 - 10.4 mg/dL 9.2    9.2    Glucose      70 - 99 mg/dL 124 (H)    136 (H)      Component      Latest Ref Rng 7/24/2024  11:41 AM 7/24/2024  3:07 PM   Magnesium      1.7 - 2.3 mg/dL 1.6 (L)  2.0           Plan:  1.  Inpatient admission for diuresis, possible R/L heart cath (was recommended to do as OP once he was euvolemic following his last hospitalization 5/2024).  2.  Note plan is for dofetilide loading/DCCV.  This  has not been undertaken yet due to volume status and renal insufficiency.      Assessment/Plan:    Persistent atrial fibrillation  As above, s/p recurrent atrial fibrillation ablation 5/2/2024, PV potentials and LA posterior wall with empiric ablation of CTI  Admitted shortly thereafter for acute hypoxic respiratory failure, thought due to HFpEF and recurrent pulmonary fibrosis/inflammation  He went back into AF 5/22/2024 by EKG in the setting of renal failure, high-dose prednisone use, and SOB    Discussed at length with Dr. Day.  Unfortunately, very challenging case given inability to tolerate AF (heart failure, even with good HR control), pulmonary fibrosis thought due to amiodarone use, which could also occur with sotalol.  He cannot use class Ic antiarrhythmic therapies due to known infarct pattern/scar on MRI and defect on stress test 5/2024.  Dr. Day has recommended dofetilide once renal function normalizes, with admission/cardioversion    Remains on AC with Xarelto.  If CrCl does not improve, will require lower dose    PLAN:  Reviewed plan for dofetilide with admission/cardioversion in an effort to restore and maintain SR.  Unfortunately, he does not tolerate AF, with heart failure symptoms, even when HR is controlled.  See Dr. Day 9/2024 post AF ablation as previously arranged.  Continue Xarelto.  Watch creatinine clearance, as may need to decrease dose    Respiratory failure, pneumonitis  As above, did not respond as expected to diuretic therapy and Pulmonology saw him and restarted high-dose prednisone therapy with prolonged taper and prophylactic Bactrim back in 5/2024.  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    Now on 10 mg of prednisone.  I believe he switches to 5 mg (1 tablet) in the coming weeks.  He is no longer on  O2    PLAN:  Pulmonology with PFTs and updated CT now rescheduled for 8/2024.  May benefit from seeing them in the hospital if his DELEON does not improve despite diuresis.      HFpEF  Admitted following ablation 5/2024 due to increase in shortness of breath and weight gain.  We had tried increasing diuretic therapy as an outpatient, but this did not improve his symptoms.  He was diuresed aggressively in the hospital and thought he did improve somewhat, Pulmonology got involved as he remained short of breath with abnormal CT chest.  Found to have relapsing pulmonary inflammation and prednisone restarted  He was diuresed on a big dose of diuretics (metolazone 2.5 mg daily, torsemide 80 AM/40 PM).  BMP on admit showed stable creatinine 1.2, but just 5 days later 5/21, he had multiple electrolyte abnormalities and creatinine up to 3.11 and many meds required holding  We've followed BMP closely with medication adjustments almost weekly, but has remained in HFpEF with complaints of increasing weight, increasing SOB, and edema  He has tried increasing doses of torsemide, every other day metolazone and remains on spironolactone    Underwent infusion clinic treatment yesterday with Bumex 2 mg push and 0.5 mg/h x 4 hours with Diuril 250 mg, and despite this, saw no improvement in weight or symptoms.  No significant diuretic response.    Discussed extensively with infusion clinic NP/CORE NP.  Given he saw no response to infusion clinic and remains >30# up from hospital discharge 5/2024 (278#), recommended for hospital admission for IV diuresis    PLAN:  Hospital admission for IV diuresis  While he was in the hospital 5/2024, a R/L Heart Cath recommended once stabilized and lung inflammation has improved.  He understands this may be able to be done while hospitalized once he is more euvolemic, but note he is still on prednisone and pulmonary follow-up is not scheduled until 8/2024  Watch renal function closely.  As above, had  significant kidney injury just 4 days after discharge following his last admission.  Blood work done today is stable, with creatinine 1.5  Will continue to follow low-sodium diet  We expect improvement with getting         CONTACTED PHARMACY LIAISON:     Unfortunately, patient's insurance doesn't contract with [facility] so I am unable to determine cost.     He would be eligible to download copay cards from the  websites to decrease his copay as needed.  [employer] and [email or web address] offer $175 off per month; [employer] lowers copay to $10/mo with a yearly maximum benefit of $4100.     Clarita Cerrato       The longitudinal plan of care for the diagnosis(es)/condition(s) as documented were addressed during this visit. Due to the added complexity in care, I will continue to support Brayden in the subsequent management and with ongoing continuity of care.    Total time spent face to face with the patient was >45 minutes. More than 50% of the time spent with the patient involved counseling and/or coordinating care. Other items discussed included, but were not limited to: prognosis, differential diagnosis, risks/benefits of treatment, instructions regarding medication and importance of compliance and follow up, risk reduction as well as discussion and coordination of care with the other health care providers and patient's care giver (Katty)          Crystal Campos PA-C, MSPAS      No orders of the defined types were placed in this encounter.    No orders of the defined types were placed in this encounter.    There are no discontinued medications.      Encounter Diagnoses   Name Primary?    Persistent atrial fibrillation (H)     Essential hypertension     Hypokalemia        CURRENT MEDICATIONS:  No current outpatient medications on file.       ALLERGIES     Allergies   Allergen Reactions    Amiodarone Shortness Of Breath     Suspected amiodarone toxicity involving lungs         Review of Systems:  Skin:   "Negative     Eyes:  Positive for visual blurring  ENT:  Negative    Respiratory:  Positive for sleep apnea;dyspnea on exertion  Cardiovascular:  Negative for;palpitations;chest pain Positive for;exercise intolerance;fatigue;lightheadedness;edema;heaviness  Gastroenterology: Negative for melena;hematochezia  Genitourinary:  Positive for nocturia  Musculoskeletal:  Negative for muscular weakness  Neurologic:  Positive for    Psychiatric:  Negative    Heme/Lymph/Imm:  Negative    Endocrine:  Negative      Physical Exam:  Vitals: /88 (BP Location: Right arm, Patient Position: Sitting)   Pulse 79   Ht 1.727 m (5' 8\")   Wt 142 kg (313 lb 1.6 oz)   SpO2 95%   BMI 47.61 kg/m      Constitutional:  cooperative, alert and oriented, well developed, well nourished, in no acute distress        Skin:  warm and dry to the touch, no apparent skin lesions or masses noted        Head:  normocephalic, no masses or lesions        Eyes:  pupils equal and round;conjunctivae and lids unremarkable;sclera white        ENT:      Slight R mouth droopiness - [place] Palsy    Neck:  JVP normal JVP >12;hepatojugular reflux      Chest:  normal breath sounds, clear to auscultation, normal A-P diameter, normal symmetry, normal respiratory excursion, no use of accessory muscles        Cardiac:   irregularly irregular rhythm       systolic ejection murmur;grade 2        Abdomen:  abdomen soft obese      Vascular: pulses full and equal                                      Extremities and Back:  no deformities, clubbing, cyanosis, erythema observed erythema;stasis pigmentation      Neurological:  no gross motor deficits            PAST MEDICAL HISTORY:  Past Medical History:   Diagnosis Date    (HFpEF) heart failure with preserved ejection fraction (H)     Acute gouty arthritis     Alcohol use disorder in remission     Amiodarone pulmonary toxicity     Aortic stenosis     Basal cell carcinoma     Of the nose    Bell's palsy     Herpes simplex " without mention of complication     Hyperlipidemia     Hypertension 2000    Persistent atrial fibrillation (H)     persistent, DCCV 12/2017, ablation 11/6/17    Pulmonary nodules     Sleep apnea     CPAP       PAST SURGICAL HISTORY:  Past Surgical History:   Procedure Laterality Date    ABLATION OF DYSRHYTHMIC FOCUS  2018, 04/12/2019    Procedure: EP Ablation Focal AFIB;  Surgeon: Fady Day MD;  Location:  HEART CARDIAC CATH LAB    ACHILLES TENDON SURGERY  1997    ANESTHESIA CARDIOVERSION N/A 2/26/2019    Procedure: ANESTHESIA CARDIOVERSION (CONNER);  Surgeon: GENERIC ANESTHESIA PROVIDER;  Location:  OR    ANESTHESIA CARDIOVERSION N/A 11/19/2020    Procedure: ANESTHESIA, FOR CARDIOVERSION (dr campbell);  Surgeon: GENERIC ANESTHESIA PROVIDER;  Location:  OR    ANESTHESIA CARDIOVERSION N/A 8/22/2022    Procedure: CARDIOVERSION;  Surgeon: GENERIC ANESTHESIA PROVIDER;  Location:  OR    ANESTHESIA CARDIOVERSION N/A 9/13/2023    Procedure: Anesthesia cardioversion;  Surgeon: GENERIC ANESTHESIA PROVIDER;  Location:  OR    ANESTHESIA CARDIOVERSION N/A 10/6/2023    Procedure: Anesthesia cardioversion;  Surgeon: GENERIC ANESTHESIA PROVIDER;  Location:  OR    BASAL CELL CARCINOMA EXCISION Right 2009    nose, took cartilage from inner ear.    CARDIOVERSION  2012, 2019    cardioversion for atr fib    COLONOSCOPY  2008    COLONOSCOPY N/A 3/22/2023    Procedure: COLONOSCOPY, FLEXIBLE, WITH LESION REMOVAL USING SNARE;  Surgeon: Anne Lucero MD;  Location:  GI    EP ABLATION FOCAL AFIB N/A 4/12/2019    Procedure: EP Ablation Focal AFIB;  Surgeon: Fady Day MD;  Location:  HEART CARDIAC CATH LAB    EP ABLATION FOCAL AFIB N/A 5/2/2024    Procedure: Ablation Atrial Fibrilation;  Surgeon: Fady Day MD;  Location:  HEART CARDIAC CATH LAB    HERNIA REPAIR, UMBILICAL  08/20/2012    Procedure: HERNIORRHAPHY UMBILICAL;  UMBILICAL HERNIA REPAIR;  Surgeon: Ra Crocker MD;  Location: [facility]     HERNIORRHAPHY UMBILICAL  2012    Procedure: HERNIORRHAPHY UMBILICAL;  UMBILICAL HERNIA REPAIR;  Surgeon: Ra Crocker MD;  Location: [facility]    ORTHOPEDIC SURGERY      ORTHOPEDIC SURGERY      OTHER SURGICAL HISTORY Right 2017    total KNEE ARTHROSCOPY     TOTAL KNEE ARTHROPLASTY Left 2018    Z NONSPECIFIC PROCEDURE  1997    achilles tendon    Z NONSPECIFIC PROCEDURE      knees, arthroscopy    ZZC NONSPECIFIC PROCEDURE      basal cell skin ca, nose    Z NONSPECIFIC PROCEDURE      flex sig; colonoscopy due 3/2008    ZZ NONSPECIFIC PROCEDURE      cardioversion for atr fib    ZZC TOTAL KNEE ARTHROPLASTY Left 2018    Dr. Malick Suarez       FAMILY HISTORY:  Family History   Problem Relation Age of Onset    Family History Negative Mother     Heart Disease Father         decesased at 42 - [place]    Heart Disease Sister          [place]    Lipids Sister     Lipids Sister     No Known Problems Sister     Heart Disease Brother          MI    Heart Disease Brother         CABG AT 49    Lung Cancer No family hx of        SOCIAL HISTORY:  Social History     Socioeconomic History    Marital status: Single     Spouse name: None    Number of children: None    Years of education: None    Highest education level: None   Tobacco Use    Smoking status: Former     Current packs/day: 0.00     Average packs/day: 1 pack/day for 34.6 years (34.6 ttl pk-yrs)     Types: Cigarettes     Start date:      Quit date: 2011     Years since quittin.9     Passive exposure: Past    Smokeless tobacco: Never    Tobacco comments:     quit    Vaping Use    Vaping status: Never Used   Substance and Sexual Activity    Alcohol use: Not Currently    Drug use: Never    Sexual activity: Not Currently     Partners: Female     Birth control/protection: None   Social History Narrative    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

## 2024-07-24 NOTE — PROGRESS NOTES
1600 Discharge instructions given to pt & wife. Verbal understanding received. All questions & concerns addressed.  1633 Bumex gtt off at this time. Pt sitting in cardiac chair. No complaints. Family  in room.  1640 Post weight - 140.9 kg - down 0.6 kg. Total oral intake 450 cc. Total urine output - 1600 cc.  1700 Pt discharged per ambulatory to private vehicle. Accompanied by wife. All personal belongings taken with pt.

## 2024-07-24 NOTE — PROGRESS NOTES
Cardiology Clinic Progress Note    [facility]) Diuretic Infusion Visit     Service Date: July 24, 2024  Primary Cardiology Team: Dr. Day and Crystal Campos PA-C (EP)    HPI:  I had the pleasure of meeting Mr. Mike Schultz for a diuretic infusion visit today. He is a very pleasant 73 year old gentleman with a complex past medical history notable for:    1. Acute on Chronic HFpEF. Hospitalized in 2/2024 for exacerbation of HFpEF and again following ablation 5/2024 for recurrent HFpEF in setting of persistent AF, prednisone use, moderate aortic stenosis.  2. Persistent atrial fibrillation. On Xarelto for CHADSVASc 4 (HFpEF, HTN, age, aortic atherosclerosis). First diagnosed in 2012. Failed flecainide and many attempts at rhythm control with multiple cardioversion and ablation attempts. Amiodarone stopped 11/2023 d/t discovery of pneumonitis.  Recurrent AF noted after stopping amiodarone, now s/p repeat ablation with Dr. Day 5/2/2024. Unfortunately, hospitalized in follow-up for acute respiratory failure, which did not seem to respond to diuresis and pulmonology restarted high dose steroid therapy due to relapse of inflammatory process.  3. Moderate aortic stenosis   4. Hypertension  5. BOWEN on CPAP  6. Myocardial Scar noted on cardiac MRI 10/2017, consistent with prior MI in the left Cfx territory. EF 51%. Avoiding flecainide use.  7. History of Alcohol dependence - Had a stretch of sobriety from 2017 through 2022, but reportedly starting drinking again in early 2022 after he was noted to be back in AFib.  8. Bell's Palsy  9. 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 Sees Dr. Valles and Linda with Pulmonology and has been on prednisone.  10.  CKD stage IIIa-b.    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 He clearly has not tolerated atrial fibrillation well at all with recurrent exacerbations of HFpEF and the prednisone seems to have made management of his volume status and trials of rhythm control extremely challenging.    His weight has gradually trended up over the past month or so to 312 pounds in the care suites today. He has had gradual worsening of exertional dyspnea and lower extremity edema as well. He otherwise denies symptoms of chest pain, palpitations, presyncope, or syncope. He is currently on a regimen of torsemide 60 mg once daily with potassium supplement at 40 mEq twice daily, along with spironolactone 25 mg once daily.  His weight has fluctuated significantly depending on his activity level and dietary changes, but he notes that he felt much better in terms of his leg swelling, exertional dyspnea and functional status when he has been closer to 285 lbs or lower.     Physical Exam:  Vitals: /83   Pulse 81   Temp 97.9  F (36.6  C) (Oral)   Resp 18   Ht 1.727 m (5' 8\")   Wt 140.9 kg (310 lb 10.1 oz)   SpO2 (!) 89%   BMI 47.23 kg/m     Wt Readings from Last 4 Encounters:   07/24/24 140.9 kg (310 lb 10.1 oz)   05/24/24 127.5 kg (281 lb)   05/22/24 125.2 kg (276 lb)   05/21/24 124.1 kg (273 lb 8 oz)     CONSTITUTIONAL: Alert and in no acute distress.  NECK: Thick neck.  Unable to visualize JVP due to body habitus  CARDIOVASCULAR:  Regular rate and rhythm. No murmur, rub or gallop.   RESPIRATORY: Breathing non-labored. Lungs are diminished in the bases bilaterally. No wheezes or crackles.  GASTROINTESTINAL: Abdomen firm, mildly distended  EXTREMITIES: 2+ pitting lower extremity edema in the feet and ankles to the knees bilaterally.   NEUROPSYCHIATRIC: No gross focal deficits. Affect appropriate. Mentation normal.     Review of Systems:  Focused cardiovascular and respiratory review of systems is negative other than the symptoms noted above in the HPI.     ASSESSMENT:  Acute on " chronic heart failure with preserved ejection fraction (HFpEF)  Persistent atrial fibrillation, chronic anticoagulated on Xarelto and rate controlled on metoprolol tartrate 12.5 mg twice daily.  Unfortunately very symptomatic and has tolerated atrial fibrillation very poorly, but has not had success with extensive attempts at rhythm control.  Moderate aortic stenosis  Hypertension, well-controlled on his current regimen  BOWEN on CPAP  Myocardial scar noted on cardiac MRI 10/2017 consistent with prior MI in the left circumflex territory and with abnormal Lexiscan nuclear stress test on 5/9/2024 showing a small area of mild ischemia in the apical segment of the LV and an area of infarction in the basal inferolateral segment with mild iva-infarct ischemia.  EF preserved at 55% and stable without anginal symptoms.  History of alcohol dependence. States that he is currently only drinking around 1-2 seltzers or other alcoholic beverages per week.  CKD stage IIIa-b. Unclear baseline creatinine and GFR with significant fluctuations over the past few months, but Cr stable today at 1.53.    PLAN:  - Increase torsemide from 60 mg to 80 mg once daily and increase his potassium supplement from 40 mg to 60 mg twice daily with a repeat BMP and NT pro BNP level in about 1 week.    - Will message the pharmacy liaison team to look into cost/coverage for SGLT2 inhibitor therapy as he may benefit from addition of Jardiance or Farxiga for management of his HFpEF going forward as well.  - Counseled on the importance of continuing to check daily weights, limiting alcohol, and trying to stick to a low-sodium diet (under 2000 mg/day).  - Discussed case with Crystal Campos PA-C. Plan for possible admission for loading with Tikosyn and cardioversion in the future for management of his atrial fibrillation as his symptoms have persisted despite great rate control so AV node ablation and PPM are not likely to be helpful.  - Follow-up as planned  with Crystal Campos PA-C tomorrow. Depending on his response to infusion clinic today and the higher dose of torsemide, we could have him come back for repeat diuretic infusion visits next week and possibly again in 2 weeks as I suspect he may have up to around 15-30 pounds of fluid. However, he seems to have a narrow therapeutic window in terms of his volume status and has had significant ROBIN with overdiuresis in the past so we will need to continue to carefully monitor his renal function and electrolytes. He's had limited urine output so far today, but anticipate he will continue to make a lot of urine this evening as he got diuril around 1530. Could consider increasing bumex to 0.75 mg/hr if he comes back for further infusion sessions.    AYAN Cornell, CNP   Nurse Practitioner  [facility]  Pager: [phone]  Text Page or securely message via [employer] (8am - 5pm, M-F)    ADDENDUM:  Vitals:    07/24/24 1054 07/24/24 1650   Weight: 141.5 kg (312 lb) 140.9 kg (310 lb 10.1 oz)     Intake/Output Summary (Last 24 hours) at 7/24/2024 1717  Last data filed at 7/24/2024 1500  Gross per 24 hour   Intake 450 ml   Output 1225 ml   Net -775 ml     Camron Sims NP 5:17 PM     Orders this Visit:  Orders Placed This Encounter   Procedures    Urea nitrogen    Urea nitrogen    Creatinine    Creatinine    Magnesium    Potassium    Sodium    Potassium    Magnesium    Sodium    Urea nitrogen    Creatinine    EKG 12-lead, tracing only    Vital Signs    Pulse oximetry nursing    Weigh patient    Measure urine output    Patient Teaching: Heart Failure    Peripheral IV catheter    Activity Up ad juan ramon    Notify Provider    Notify Provider    May discharge when: other (specify in comments) IF Systolic blood pressure is greater than or equal to 90 mmHg AND patient is ambulatory without symptoms.    Discharge planning    I have reviewed and agree with all the recommendations and orders detailed in this document.     Fluid restriction 750 ML FLUID    Oxygen: Nasal cannula    Discharge patient     Orders Placed This Encounter   Medications    lidocaine 1 % 0.1-1 mL    lidocaine (LMX4) cream    sodium chloride (PF) 0.9% PF flush 3 mL    sodium chloride (PF) 0.9% PF flush 3 mL    bumetanide (BUMEX) injection 2 mg    bumetanide (BUMEX) 0.25 mg/mL infusion    sodium chloride 0.9 % infusion    chlorothiazide (DIURIL) injection 250 mg    potassium chloride pushpa ER (KLOR-CON M20) CR tablet 20 mEq     Order Specific Question:   Potassium Replacement     Answer:   Potassium level 3.5-3.8 mmol/L     Order Specific Question:   Recheck     Answer:   Potassium level next AM    magnesium sulfate 2 g in 50 mL sterile water intermittent infusion    potassium chloride pushpa ER (KLOR-CON M20) 20 MEQ CR tablet     Sig: Take 3 tablets (60 mEq) by mouth 2 times daily     Dispense:  270 tablet     Refill:  2    torsemide (DEMADEX) 20 MG tablet     Sig: Take 4 tablets (80 mg) by mouth daily     Dispense:  360 tablet     Refill:  2    potassium chloride pushpa ER (KLOR-CON M20) CR tablet 40 mEq     Medications Discontinued During This Encounter   Medication Reason    mirtazapine (REMERON) 7.5 MG tablet Med Rec(No AVS / No eCancel)    omeprazole (PRILOSEC) 20 MG DR capsule Med Rec(No AVS / No eCancel)    omeprazole (PRILOSEC) 40 MG DR capsule Med Rec(No AVS / No eCancel)    sodium chloride (PF) 0.9% PF flush 3 mL Med Rec(No AVS / No eCancel)    potassium chloride pushpa ER (KLOR-CON M20) 20 MEQ CR tablet     torsemide (DEMADEX) 20 MG tablet      CURRENT MEDICATIONS:  Facility-Administered Medications Prior to Admission   Medication Dose Route Frequency Provider Last Rate Last Admin    [DISCONTINUED] sodium chloride (PF) 0.9% PF flush 3 mL  3 mL Intravenous q1 min prn          Medications Prior to Admission   Medication Sig Dispense Refill Last Dose    allopurinol (ZYLOPRIM) 300 MG tablet Take 1 tablet (300 mg) by mouth daily 90 tablet 4 7/24/2024 at 0700     cetirizine (ZYRTEC) 10 MG tablet Take 10 mg by mouth as needed for allergies   7/23/2024 at 2000    finasteride (PROPECIA) 1 MG tablet Take 1 tablet (1 mg) by mouth daily 90 tablet 4 7/24/2024 at 0700    losartan (COZAAR) 100 MG tablet Take 50 mg by mouth daily   7/24/2024 at 0700    metoprolol tartrate (LOPRESSOR) 25 MG tablet Take 0.5 tablets (12.5 mg) by mouth 2 times daily 90 tablet 4 7/24/2024 at 0700    predniSONE (DELTASONE) 5 MG tablet Take 8 tablets (40 mg) by mouth daily for 10 days, THEN 4 tablets (20 mg) daily for 30 days, THEN 2 tablets (10 mg) daily for 45 days, THEN 1 tablet (5 mg) daily for 60 days. 350 tablet 0 7/24/2024 at 40346    rivaroxaban ANTICOAGULANT (XARELTO ANTICOAGULANT) 20 MG TABS tablet Take 1 tablet (20 mg) by mouth daily (with dinner) 90 tablet 3 7/23/2024 at 2000    spironolactone (ALDACTONE) 25 MG tablet Take 1 tablet (25 mg) by mouth daily 90 tablet 2 Unknown    sulfamethoxazole-trimethoprim (BACTRIM DS) 800-160 MG tablet Take 1 tablet by mouth daily while on steroids 30 tablet 3 Unknown    [DISCONTINUED] potassium chloride pushpa ER (KLOR-CON M20) 20 MEQ CR tablet Take 2 tablets (40 mEq) by mouth 2 times daily 270 tablet 2 7/24/2024 at 0700    [DISCONTINUED] torsemide (DEMADEX) 20 MG tablet Take 3 tablets (60 mg) by mouth daily 270 tablet 2 7/23/2024 at 0700     Current Facility-Administered Medications   Medication Dose Route Frequency Provider Last Rate Last Admin    sodium chloride 0.9 % infusion   Intravenous Continuous Natalie Campos PA-C 10 mL/hr at 07/24/24 1119 New Bag at 07/24/24 1119     ALLERGIES:  Allergies   Allergen Reactions    Amiodarone Shortness Of Breath     Suspected amiodarone toxicity involving lungs     PAST MEDICAL HISTORY:  Past Medical History:   Diagnosis Date    (HFpEF) heart failure with preserved ejection fraction (H)     Acute gouty arthritis     Alcohol use disorder in remission     Amiodarone pulmonary toxicity     Aortic stenosis      Basal cell carcinoma     Of the nose    Bell's palsy     Herpes simplex without mention of complication     Hyperlipidemia     Hypertension 2000    Persistent atrial fibrillation (H)     persistent, DCCV 12/2017, ablation 11/6/17    Pulmonary nodules     Sleep apnea     CPAP     PAST SURGICAL HISTORY:  Past Surgical History:   Procedure Laterality Date    ABLATION OF DYSRHYTHMIC FOCUS  2018, 04/12/2019    Procedure: EP Ablation Focal AFIB;  Surgeon: Fady Day MD;  Location:  [facility]    ACHILLES TENDON SURGERY  1997    ANESTHESIA CARDIOVERSION N/A 2/26/2019    Procedure: ANESTHESIA CARDIOVERSION (CONNER);  Surgeon: GENERIC ANESTHESIA PROVIDER;  Location:  OR    ANESTHESIA CARDIOVERSION N/A 11/19/2020    Procedure: ANESTHESIA, FOR CARDIOVERSION (dr campbell);  Surgeon: GENERIC ANESTHESIA PROVIDER;  Location:  OR    ANESTHESIA CARDIOVERSION N/A 8/22/2022    Procedure: CARDIOVERSION;  Surgeon: GENERIC ANESTHESIA PROVIDER;  Location:  OR    ANESTHESIA CARDIOVERSION N/A 9/13/2023    Procedure: Anesthesia cardioversion;  Surgeon: GENERIC ANESTHESIA PROVIDER;  Location:  OR    ANESTHESIA CARDIOVERSION N/A 10/6/2023    Procedure: Anesthesia cardioversion;  Surgeon: GENERIC ANESTHESIA PROVIDER;  Location:  OR    BASAL CELL CARCINOMA EXCISION Right 2009    nose, took cartilage from inner ear.    CARDIOVERSION  2012, 2019    cardioversion for atr fib    COLONOSCOPY  2008    COLONOSCOPY N/A 3/22/2023    Procedure: COLONOSCOPY, FLEXIBLE, WITH LESION REMOVAL USING SNARE;  Surgeon: Anne Lucero MD;  Location:  GI    EP ABLATION FOCAL AFIB N/A 4/12/2019    Procedure: EP Ablation Focal AFIB;  Surgeon: Fady Day MD;  Location:  [facility]    EP ABLATION FOCAL AFIB N/A 5/2/2024    Procedure: Ablation Atrial Fibrilation;  Surgeon: Fady Day MD;  Location:  [facility]    HERNIA REPAIR, UMBILICAL  08/20/2012    Procedure: HERNIORRHAPHY UMBILICAL;  UMBILICAL  HERNIA REPAIR;  Surgeon: Ra Crocker MD;  Location: [facility]    HERNIORRHAPHY UMBILICAL  2012    Procedure: HERNIORRHAPHY UMBILICAL;  UMBILICAL HERNIA REPAIR;  Surgeon: Ra Crocker MD;  Location: [facility]    ORTHOPEDIC SURGERY      ORTHOPEDIC SURGERY      OTHER SURGICAL HISTORY Right 2017    total KNEE ARTHROSCOPY     TOTAL KNEE ARTHROPLASTY Left 2018    ZZ NONSPECIFIC PROCEDURE      achilles tendon    Z NONSPECIFIC PROCEDURE      knees, arthroscopy    ZZC NONSPECIFIC PROCEDURE      basal cell skin ca, nose    ZZC NONSPECIFIC PROCEDURE      flex sig; colonoscopy due 3/2008    ZZC NONSPECIFIC PROCEDURE      cardioversion for atr fib    ZZC TOTAL KNEE ARTHROPLASTY Left 2018    Dr. Malick Suarez     FAMILY HISTORY:  Family History   Problem Relation Age of Onset    Family History Negative Mother     Heart Disease Father         decesased at 42 - MI    Heart Disease Sister          MI    Lipids Sister     Lipids Sister     No Known Problems Sister     Heart Disease Brother          MI    Heart Disease Brother         CABG AT 49    Lung Cancer No family hx of      SOCIAL HISTORY:  Social History     Socioeconomic History    Marital status: Single   Tobacco Use    Smoking status: Former     Current packs/day: 0.00     Average packs/day: 1 pack/day for 34.6 years (34.6 ttl pk-yrs)     Types: Cigarettes     Start date:      Quit date: 2011     Years since quittin.9     Passive exposure: Past    Smokeless tobacco: Never    Tobacco comments:     quit 2011   Vaping Use    Vaping status: Never Used   Substance and Sexual Activity    Alcohol use: Not Currently    Drug use: Never    Sexual activity: Not Currently     Partners: Female     Birth control/protection: None   Social History Narrative    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    Recent Lab Results:  LIPID RESULTS:  Lab Results   Component Value Date    CHOL 163 07/27/2023    CHOL 159 06/30/2021    HDL 39 (L) 07/27/2023    HDL 44 06/30/2021    LDL 97 07/27/2023    LDL 91 06/30/2021    TRIG 133 07/27/2023    TRIG 121 06/30/2021    CHOLHDLRATIO 3.8 03/16/2015     LIVER ENZYME RESULTS:  Lab Results   Component Value Date    AST 34 05/05/2024    AST 24 06/30/2021    ALT 40 05/05/2024    ALT 42 06/30/2021     CBC RESULTS:  Lab Results   Component Value Date    WBC 16.4 (H) 05/16/2024    WBC 9.8 09/21/2020    RBC 4.65 05/16/2024    RBC 4.47 09/21/2020    HGB 15.0 05/16/2024    HGB 14.4 09/21/2020    HCT 45.6 05/16/2024    HCT 44.7 09/21/2020    MCV 98 05/16/2024     09/21/2020    MCH 32.3 05/16/2024    MCH 32.2 09/21/2020    MCHC 32.9 05/16/2024    MCHC 32.2 09/21/2020    RDW 13.9 05/16/2024    RDW 13.3 09/21/2020     05/16/2024     09/21/2020     BMP RESULTS:  Lab Results   Component Value Date     07/24/2024     06/30/2021    POTASSIUM 3.8 07/24/2024    POTASSIUM 4.6 12/17/2022    POTASSIUM 3.6 06/30/2021    CHLORIDE 94 (L) 07/17/2024    CHLORIDE 106 12/17/2022    CHLORIDE 109 06/30/2021    CO2 28 07/17/2024    CO2 30 12/17/2022    CO2 27 06/30/2021    ANIONGAP 14 07/17/2024    ANIONGAP 4 12/17/2022    ANIONGAP 5 06/30/2021     (H) 07/17/2024     (H) 11/26/2023     (H) 12/17/2022    GLC 89 06/30/2021    BUN 33.4 (H) 07/24/2024    BUN 15 12/17/2022    BUN 13 06/30/2021    CR 1.56 (H) 07/24/2024    CR 1.06 06/30/2021    GFRESTIMATED 47 (L) 07/24/2024    GFRESTIMATED 71 06/30/2021    GFRESTBLACK 82 06/30/2021    ASHELY 9.2 07/17/2024    ASHELY 8.7 06/30/2021      A1C RESULTS:  Lab Results   Component Value Date    A1C 5.7 (H) 02/25/2019

## 2024-07-25 ENCOUNTER — OFFICE VISIT (OUTPATIENT)
Dept: CARDIOLOGY | Facility: CLINIC | Age: 74
End: 2024-07-25
Payer: COMMERCIAL

## 2024-07-25 ENCOUNTER — APPOINTMENT (OUTPATIENT)
Dept: GENERAL RADIOLOGY | Facility: CLINIC | Age: 74
DRG: 286 | End: 2024-07-25
Attending: NURSE PRACTITIONER
Payer: COMMERCIAL

## 2024-07-25 ENCOUNTER — TELEPHONE (OUTPATIENT)
Dept: CARDIOLOGY | Facility: CLINIC | Age: 74
End: 2024-07-25

## 2024-07-25 ENCOUNTER — LAB (OUTPATIENT)
Dept: LAB | Facility: CLINIC | Age: 74
End: 2024-07-25
Payer: COMMERCIAL

## 2024-07-25 ENCOUNTER — HOSPITAL ENCOUNTER (INPATIENT)
Facility: CLINIC | Age: 74
LOS: 15 days | Discharge: HOME | DRG: 286 | End: 2024-08-09
Attending: STUDENT IN AN ORGANIZED HEALTH CARE EDUCATION/TRAINING PROGRAM | Admitting: STUDENT IN AN ORGANIZED HEALTH CARE EDUCATION/TRAINING PROGRAM
Payer: COMMERCIAL

## 2024-07-25 VITALS
WEIGHT: 313.1 LBS | HEIGHT: 68 IN | HEART RATE: 79 BPM | SYSTOLIC BLOOD PRESSURE: 125 MMHG | OXYGEN SATURATION: 95 % | DIASTOLIC BLOOD PRESSURE: 88 MMHG | BODY MASS INDEX: 47.45 KG/M2

## 2024-07-25 DIAGNOSIS — E87.6 HYPOKALEMIA: ICD-10-CM

## 2024-07-25 DIAGNOSIS — I50.9 ACUTE DECOMPENSATED HEART FAILURE (H): ICD-10-CM

## 2024-07-25 DIAGNOSIS — I50.9 ACUTE ON CHRONIC CONGESTIVE HEART FAILURE, UNSPECIFIED HEART FAILURE TYPE (H): Primary | ICD-10-CM

## 2024-07-25 DIAGNOSIS — I50.9 ACUTE ON CHRONIC CONGESTIVE HEART FAILURE, UNSPECIFIED HEART FAILURE TYPE (H): ICD-10-CM

## 2024-07-25 DIAGNOSIS — S81.801A WOUND OF RIGHT LOWER EXTREMITY, INITIAL ENCOUNTER: ICD-10-CM

## 2024-07-25 DIAGNOSIS — B37.2 YEAST INFECTION OF THE SKIN: ICD-10-CM

## 2024-07-25 DIAGNOSIS — I10 ESSENTIAL HYPERTENSION: ICD-10-CM

## 2024-07-25 DIAGNOSIS — I10 BENIGN ESSENTIAL HYPERTENSION: ICD-10-CM

## 2024-07-25 DIAGNOSIS — I48.19 PERSISTENT ATRIAL FIBRILLATION (H): ICD-10-CM

## 2024-07-25 LAB
ANION GAP SERPL CALCULATED.3IONS-SCNC: 9 MMOL/L (ref 7–15)
BUN SERPL-MCNC: 31.1 MG/DL (ref 8–23)
CALCIUM SERPL-MCNC: 9.2 MG/DL (ref 8.8–10.4)
CHLORIDE SERPL-SCNC: 96 MMOL/L (ref 98–107)
CREAT SERPL-MCNC: 1.56 MG/DL (ref 0.67–1.17)
EGFRCR SERPLBLD CKD-EPI 2021: 47 ML/MIN/1.73M2
GLUCOSE SERPL-MCNC: 136 MG/DL (ref 70–99)
HCO3 SERPL-SCNC: 32 MMOL/L (ref 22–29)
POTASSIUM SERPL-SCNC: 3.5 MMOL/L (ref 3.4–5.3)
SODIUM SERPL-SCNC: 137 MMOL/L (ref 135–145)

## 2024-07-25 PROCEDURE — G2211 COMPLEX E/M VISIT ADD ON: HCPCS | Performed by: PHYSICIAN ASSISTANT

## 2024-07-25 PROCEDURE — 210N000002 HC R&B HEART CARE

## 2024-07-25 PROCEDURE — 36415 COLL VENOUS BLD VENIPUNCTURE: CPT | Performed by: PHYSICIAN ASSISTANT

## 2024-07-25 PROCEDURE — 250N000009 HC RX 250: Performed by: NURSE PRACTITIONER

## 2024-07-25 PROCEDURE — 93010 ELECTROCARDIOGRAM REPORT: CPT | Performed by: INTERNAL MEDICINE

## 2024-07-25 PROCEDURE — 80048 BASIC METABOLIC PNL TOTAL CA: CPT | Performed by: PHYSICIAN ASSISTANT

## 2024-07-25 PROCEDURE — 93005 ELECTROCARDIOGRAM TRACING: CPT

## 2024-07-25 PROCEDURE — 250N000013 HC RX MED GY IP 250 OP 250 PS 637: Performed by: NURSE PRACTITIONER

## 2024-07-25 PROCEDURE — 99215 OFFICE O/P EST HI 40 MIN: CPT | Performed by: PHYSICIAN ASSISTANT

## 2024-07-25 PROCEDURE — 99223 1ST HOSP IP/OBS HIGH 75: CPT | Performed by: NURSE PRACTITIONER

## 2024-07-25 PROCEDURE — 250N000011 HC RX IP 250 OP 636: Performed by: NURSE PRACTITIONER

## 2024-07-25 PROCEDURE — 71045 X-RAY EXAM CHEST 1 VIEW: CPT

## 2024-07-25 RX ORDER — CALCIUM CARBONATE 500 MG/1
1000 TABLET, CHEWABLE ORAL 4 TIMES DAILY PRN
Status: DISCONTINUED | OUTPATIENT
Start: 2024-07-25 | End: 2024-08-09 | Stop reason: HOSPADM

## 2024-07-25 RX ORDER — LOSARTAN POTASSIUM 50 MG/1
50 TABLET ORAL DAILY
Status: DISCONTINUED | OUTPATIENT
Start: 2024-07-26 | End: 2024-08-07

## 2024-07-25 RX ORDER — AMOXICILLIN 250 MG
2 CAPSULE ORAL 2 TIMES DAILY PRN
Status: DISCONTINUED | OUTPATIENT
Start: 2024-07-25 | End: 2024-08-09 | Stop reason: HOSPADM

## 2024-07-25 RX ORDER — ONDANSETRON 2 MG/ML
4 INJECTION INTRAMUSCULAR; INTRAVENOUS EVERY 6 HOURS PRN
Status: DISCONTINUED | OUTPATIENT
Start: 2024-07-25 | End: 2024-08-01

## 2024-07-25 RX ORDER — PREDNISONE 10 MG/1
10 TABLET ORAL DAILY
Status: COMPLETED | OUTPATIENT
Start: 2024-07-26 | End: 2024-08-08

## 2024-07-25 RX ORDER — CETIRIZINE HYDROCHLORIDE 10 MG/1
10 TABLET ORAL EVERY EVENING
Status: DISCONTINUED | OUTPATIENT
Start: 2024-07-25 | End: 2024-08-09 | Stop reason: HOSPADM

## 2024-07-25 RX ORDER — PROCHLORPERAZINE MALEATE 5 MG
5 TABLET ORAL EVERY 6 HOURS PRN
Status: DISCONTINUED | OUTPATIENT
Start: 2024-07-25 | End: 2024-08-09 | Stop reason: HOSPADM

## 2024-07-25 RX ORDER — ACETAMINOPHEN 650 MG/1
650 SUPPOSITORY RECTAL EVERY 4 HOURS PRN
Status: DISCONTINUED | OUTPATIENT
Start: 2024-07-25 | End: 2024-08-09 | Stop reason: HOSPADM

## 2024-07-25 RX ORDER — ONDANSETRON 4 MG/1
4 TABLET, ORALLY DISINTEGRATING ORAL EVERY 6 HOURS PRN
Status: DISCONTINUED | OUTPATIENT
Start: 2024-07-25 | End: 2024-08-01

## 2024-07-25 RX ORDER — ACETAMINOPHEN 325 MG/1
650 TABLET ORAL EVERY 4 HOURS PRN
Status: DISCONTINUED | OUTPATIENT
Start: 2024-07-25 | End: 2024-08-09 | Stop reason: HOSPADM

## 2024-07-25 RX ORDER — PREDNISONE 5 MG/1
5 TABLET ORAL DAILY
Status: DISCONTINUED | OUTPATIENT
Start: 2024-08-09 | End: 2024-08-09

## 2024-07-25 RX ORDER — POTASSIUM CHLORIDE 1500 MG/1
60 TABLET, EXTENDED RELEASE ORAL 2 TIMES DAILY
Status: DISCONTINUED | OUTPATIENT
Start: 2024-07-25 | End: 2024-07-30

## 2024-07-25 RX ORDER — BUMETANIDE 0.25 MG/ML
2 INJECTION INTRAMUSCULAR; INTRAVENOUS ONCE
Status: COMPLETED | OUTPATIENT
Start: 2024-07-25 | End: 2024-07-25

## 2024-07-25 RX ORDER — SULFAMETHOXAZOLE/TRIMETHOPRIM 800-160 MG
1 TABLET ORAL DAILY
Status: DISCONTINUED | OUTPATIENT
Start: 2024-07-26 | End: 2024-08-01

## 2024-07-25 RX ORDER — PROCHLORPERAZINE 25 MG
12.5 SUPPOSITORY, RECTAL RECTAL EVERY 12 HOURS PRN
Status: DISCONTINUED | OUTPATIENT
Start: 2024-07-25 | End: 2024-08-09 | Stop reason: HOSPADM

## 2024-07-25 RX ORDER — SPIRONOLACTONE 25 MG/1
25 TABLET ORAL DAILY
Status: DISCONTINUED | OUTPATIENT
Start: 2024-07-25 | End: 2024-08-03

## 2024-07-25 RX ORDER — FINASTERIDE 1 MG/1
1 TABLET, FILM COATED ORAL DAILY
Status: DISCONTINUED | OUTPATIENT
Start: 2024-07-26 | End: 2024-08-09 | Stop reason: HOSPADM

## 2024-07-25 RX ORDER — CHLOROTHIAZIDE SODIUM 500 MG/1
250 INJECTION INTRAVENOUS ONCE
Status: COMPLETED | OUTPATIENT
Start: 2024-07-25 | End: 2024-07-25

## 2024-07-25 RX ORDER — AMOXICILLIN 250 MG
1 CAPSULE ORAL 2 TIMES DAILY PRN
Status: DISCONTINUED | OUTPATIENT
Start: 2024-07-25 | End: 2024-08-09 | Stop reason: HOSPADM

## 2024-07-25 RX ORDER — TORSEMIDE 20 MG/1
80 TABLET ORAL DAILY
Status: DISCONTINUED | OUTPATIENT
Start: 2024-07-25 | End: 2024-08-01

## 2024-07-25 RX ORDER — LIDOCAINE 40 MG/G
CREAM TOPICAL
Status: DISCONTINUED | OUTPATIENT
Start: 2024-07-25 | End: 2024-08-09 | Stop reason: HOSPADM

## 2024-07-25 RX ORDER — ALLOPURINOL 300 MG/1
300 TABLET ORAL DAILY
Status: DISCONTINUED | OUTPATIENT
Start: 2024-07-26 | End: 2024-08-09 | Stop reason: HOSPADM

## 2024-07-25 RX ORDER — POLYETHYLENE GLYCOL 3350 17 G/17G
17 POWDER, FOR SOLUTION ORAL DAILY
Status: DISCONTINUED | OUTPATIENT
Start: 2024-07-25 | End: 2024-08-09 | Stop reason: HOSPADM

## 2024-07-25 RX ADMIN — Medication 1 CAPSULE: at 21:40

## 2024-07-25 RX ADMIN — RIVAROXABAN 20 MG: 20 TABLET, FILM COATED ORAL at 18:41

## 2024-07-25 RX ADMIN — BUMETANIDE 2 MG: 0.25 INJECTION INTRAMUSCULAR; INTRAVENOUS at 18:01

## 2024-07-25 RX ADMIN — CETIRIZINE HYDROCHLORIDE 10 MG: 10 TABLET, FILM COATED ORAL at 21:39

## 2024-07-25 RX ADMIN — BUMETANIDE 0.5 MG/HR: 0.25 INJECTION INTRAMUSCULAR; INTRAVENOUS at 18:00

## 2024-07-25 RX ADMIN — Medication 12.5 MG: at 21:39

## 2024-07-25 RX ADMIN — POTASSIUM CHLORIDE 60 MEQ: 1500 TABLET, EXTENDED RELEASE ORAL at 21:39

## 2024-07-25 RX ADMIN — CHLOROTHIAZIDE SODIUM 250 MG: 500 INJECTION, POWDER, LYOPHILIZED, FOR SOLUTION INTRAVENOUS at 18:01

## 2024-07-25 ASSESSMENT — ACTIVITIES OF DAILY LIVING (ADL)
ADLS_ACUITY_SCORE: 23
ADLS_ACUITY_SCORE: 20
ADLS_ACUITY_SCORE: 37
ADLS_ACUITY_SCORE: 37
ADLS_ACUITY_SCORE: 23

## 2024-07-25 NOTE — H&P
[facility]  History and Physical - Hospitalist Service       Date of Admission:  7/25/2024  PRIMARY CARE PROVIDER:    Ranjan Courtney    Assessment & Plan   Mike Schultz is a 73 year old male with a PMH significant for persistent atrial fibrillation, HFpEF, moderate aortic stenosis, hypertension, BOWEN, alcohol use disorder, Bell's palsy, lung nodules who was directly admitted from cardiology EP clinic on 7/25/2024 for ongoing decompensated heart failure symptoms despite aggressive outpatient management with primary drivers of his heart failure being his atrial fibrillation and corticosteroid use.    Acute on chronic decompensated HFpEF with primary symptoms of significant weight gain now up to 310 pounds, diffuse edema, dyspnea.  Likely multifactorial etiology including ongoing corticosteroid use as well as PAF  - Consult general cardiology may also need EP  -Bumex 2 mg IV x 1  - Bumex 0.5 mg/h continuous infusion  -Diuril 250 mg IV x 1  - Scheduled potassium 60 mEq twice daily  - Every 8 hour BMPs  - 2 g sodium diet and 2 L fluid restriction  - Baseline EKG  -Hold PTA Aldactone, and torsemide     Exertional dyspnea likely secondary to pulmonary edema from acute on chronic decompensated HFpEF.  Respiratory insufficiency initially from pulmonary toxicity from amiodarone versus other inflammatory process still on corticosteroids  BOWEN with variable compliance secondary to frequent nocturia  -Intermittent pulse oximetry  - Baseline chest x-ray  - Diuresing as above  - CPAP at bedtime with 3 L O2 bleed  -Continue PTA corticosteroids and prophylactic Bactrim  -Pending PFTs and repeat CT for August 2024    Persistent A-fib despite attempts at multiple antiarrhythmics including flecainide which he failed, amiodarone resulted in pneumonitis.  He has had multiple interventions including PVI and SVC isolation in 2017, repeated in 2019 multiple prior cardioversions.  Further repeat ablations,  "reisolation of reconnected PVI and empiric CTI ablation.  -Telemetry  -Continue PTA metoprolol  - Antiarrhythmics as per cardiology and EP, planning dofetilide this admission with cardioversion to maintain sinus rhythm  -Continue DOAC    CKD likely stage III, baseline creatinine 1.5  -Serial BMPs as above has history of decompensating into ROBIN with aggressive diuresis    Moderate aortic stenosis  -Continue PTA metoprolol    Hypertension  -Continue PTA losartan with hold parameters     history of alcohol use disorder with intermittent spells of sobriety, had a few seltzer drinks last week.  -Cessation encouraged    Bell's palsy with asymmetric smile   -Noted    Lung nodules  - Follows with outpatient pulmonology    BPH  - Continue PTA finasteride    RLE wound  - Wound care consult    Clinically Significant Risk Factors Present on Admission            # Hypomagnesemia: Lowest Mg = 1.6 mg/dL in last 2 days, will replace as needed    # Drug Induced Coagulation Defect: home medication list includes an anticoagulant medication    # Hypertension: Noted on problem list  # Chronic heart failure with preserved ejection fraction: heart failure noted on problem list and last echo with EF >50%        # Severe Obesity: Estimated body mass index is 47.61 kg/m  as calculated from the following:    Height as of an earlier encounter on 7/25/24: 1.727 m (5' 8\").    Weight as of an earlier encounter on 7/25/24: 142 kg (313 lb 1.6 oz).       # Financial/Environmental Concerns:                Diet:  2 g sodium diet, 2 L/day fluid restriction, mod carbohydrate  DVT Prophylaxis: DOAC  Palmer Catheter: Not present  Lines: None     Cardiac Monitoring: None  Code Status:  Full code         Disposition Plan    TBD pending clinical course  Entered: AYAN Morgan CNP 07/25/2024, 4:32 PM     The patient's care was discussed with the Attending Physician, Dr. Garcia, Bedside Nurse, Patient, Patient's Family, and EP Team.      Courtney Roy, " APRN CNP  [facility]  Securely message with [employer] (more info)  Text page via [facility] Paging/Directory     ______________________________________________________________________    Chief Complaint   Dyspnea lower extremity edema and weight gain    History is obtained from the patient and EMR    History of Present Illness   Mike Schultz is a 73 year old male with a PMH significant for persistent atrial fibrillation, HFpEF, moderate aortic stenosis, hypertension, BOWEN, alcohol use disorder, Bell's palsy, lung nodules who was directly admitted from cardiology EP clinic on 7/25/2024 for ongoing decompensated heart failure symptoms despite aggressive outpatient management with primary drivers of his heart failure being his atrial fibrillation and corticosteroid use.    Despite aggressive outpatient management including rapid up titration of his torsemide from 60 mg daily to 80 mg daily as well as Aldactone 25 mg daily, and intermittent outpatient IV diuretics with Bumex infusion over 4 hours and intermittent dosing of IV Diuril resulting in 1.6 L urine output on 7/24/2025 patient's weight has ballooned from his ideal weight of 240 pounds where he feels well now up to 310 pounds.  This has been ongoing since January 2024.  He describes progressively worsening dyspnea having to rest after getting to the top of the stairs, edema all the way up to his thighs and abdomen resulting in difficulty ambulating.  He denies any palpitations or chest pain.  He still is on steroids lungs generally feel okay but he feels worse when he is in A-fib which results in fluid retention and dyspnea.  Follows a no added salt diet.  At times gets lightheaded with standing and is quite sleep deprived because of frequent nocturia.    Ongoing need for corticosteroids is complicating his heart failure management.  He has been on corticosteroids intermittently initially for amiodarone pulmonary toxicity requiring roughly  2-week inpatient hospital stay.  He completed that course but had to be resumed because of relapsing pulmonary inflammation.     10 point ROS neg aside from what is described above.  Hospitalist service was asked to admit the pt for further evaluation and management.  I personally discussed management with the EP OTTO.    Past Medical History    I have reviewed this patient's medical history and updated it with pertinent information if needed.   Past Medical History:   Diagnosis Date    (HFpEF) heart failure with preserved ejection fraction (H)     Acute gouty arthritis     Alcohol use disorder in remission     Amiodarone pulmonary toxicity     Aortic stenosis     Basal cell carcinoma     Of the nose    Bell's palsy     Herpes simplex without mention of complication     Hyperlipidemia     Hypertension 2000    Persistent atrial fibrillation (H)     persistent, DCCV 12/2017, ablation 11/6/17    Pulmonary nodules     Sleep apnea     CPAP       Prior to Admission Medications   Prior to Admission Medications   Prescriptions Last Dose Informant Patient Reported? Taking?   allopurinol (ZYLOPRIM) 300 MG tablet   No No   Sig: Take 1 tablet (300 mg) by mouth daily   cetirizine (ZYRTEC) 10 MG tablet   Yes No   Sig: Take 10 mg by mouth as needed for allergies   finasteride (PROPECIA) 1 MG tablet   No No   Sig: Take 1 tablet (1 mg) by mouth daily   losartan (COZAAR) 100 MG tablet   Yes No   Sig: Take 50 mg by mouth daily   metoprolol tartrate (LOPRESSOR) 25 MG tablet   No No   Sig: Take 0.5 tablets (12.5 mg) by mouth 2 times daily   potassium chloride pushpa ER (KLOR-CON M20) 20 MEQ CR tablet   No No   Sig: Take 3 tablets (60 mEq) by mouth 2 times daily   predniSONE (DELTASONE) 5 MG tablet   No No   Sig: Take 8 tablets (40 mg) by mouth daily for 10 days, THEN 4 tablets (20 mg) daily for 30 days, THEN 2 tablets (10 mg) daily for 45 days, THEN 1 tablet (5 mg) daily for 60 days.   rivaroxaban ANTICOAGULANT (XARELTO ANTICOAGULANT) 20  MG TABS tablet   No No   Sig: Take 1 tablet (20 mg) by mouth daily (with dinner)   spironolactone (ALDACTONE) 25 MG tablet   No No   Sig: Take 1 tablet (25 mg) by mouth daily   sulfamethoxazole-trimethoprim (BACTRIM DS) 800-160 MG tablet   No No   Sig: Take 1 tablet by mouth daily while on steroids   torsemide (DEMADEX) 20 MG tablet   No No   Sig: Take 4 tablets (80 mg) by mouth daily      Facility-Administered Medications: None     Social history patient and family recently experienced the death of their daughter from cancer in June 2024 and there are numerous other psychosocial stressors ongoing    Allergies   Allergies   Allergen Reactions    Amiodarone Shortness Of Breath     Suspected amiodarone toxicity involving lungs       Physical Exam   Vital Signs: Temp: 98  F (36.7  C) Temp src: Oral BP: (!) 117/99 Pulse: 104   Resp: 19 SpO2: 94 % O2 Device: None (Room air)    Weight: 0 lbs 0 oz    Constitutional: vs as per EMR  General:  adult pt sitting in chair without acute distress  Neuro: +follows commands wiggle toes and show 2 fingers bilat, face symmetric, tongue midline, speech fluent  Eyes pupils equal round 3mm briskly reactive bilat, sclera nonicteric, noninjected, conjunctiva pink,  Head, ENT & mouth: NC/AT,  mouth moist oral mucosa  Neck: supple  CV S1S2 3/6 murmur loudest at the left sternal border  resp: CTAB upper and lower lobes, no wheezing or rales  gi:normoactive bowel sounds, soft, nontender, nondisteded  Ext: +3 LE edema, no mottling  Skin: no rashes on exposed skin  Musculoskeletal no bony joint deformities      Medical Decision Making       High complexity       Data   Data reviewed today: I reviewed all medications, new labs and imaging results over the last 24 hours. I personally reviewed no images or EKG's today.      I have personally reviewed the following data over the past 24 hrs:    N/A  \   N/A   / N/A     137 96 (L) 31.1 (H) /  136 (H)   3.5 32 (H) 1.56 (H) \       Imaging results  reviewed over the past 24 hrs:   No results found for this or any previous visit (from the past 24 hour(s)).

## 2024-07-25 NOTE — TELEPHONE ENCOUNTER
RE: SGLT2 Inhibitor Cost/coverage check  Received: Today  Clarita Cerrato Alexander, NP  Unfortunately, patient's insurance doesn't contract with [facility] so I am unable to determine cost.    He would be eligible to download copay cards from the  websites to decrease his copay as needed. [employer] and [employer] offer $175 off per month; [employer] lowers copay to $10/mo with a yearly maximum benefit of $4100.    Clarita Cerrato  Pharmacy Technician/Liaison, Discharge Pharmacy  [phone] (voice or text)  [email or web address]

## 2024-07-25 NOTE — TELEPHONE ENCOUNTER
7/25/24 Verbal order from Crystal Campos PA-C for direct admit today for heart failure is needed. Called pt placement, spoke inés Fernandes . After checking, no bed availability right now, but there are patients awaiting discharge orders for today. Once they are discharged, beds will be available today. Unit will call pt when bed is available, Crystal Campos PA-C phone # given to Dena who will pass onto hospital ist.  Hospital ist will call Crystal when pt arrives for report/orders   Pt and significant other Katty updated. They will go home and await call from [facility] 1211 pm

## 2024-07-25 NOTE — PHARMACY-ADMISSION MEDICATION HISTORY
Pharmacist Admission Medication History    Admission medication history is complete. The information provided in this note is only as accurate as the sources available at the time of the update.    Information Source(s): Patient, [facility] records, and [facility] via in[facility]person    Pertinent Information: Prednisone taper is at 10 mg until ~8/8 or 8/9 then decrease to 5 mg daily     Changes made to PTA medication list:  Added: psyllium   Deleted: None  Changed: None    Allergies reviewed with patient and updates made in EHR: yes    Medication History Completed By: Yuni Murray [facility] 7/25/2024 5:44 PM    PTA Med List   Medication Sig Last Dose    allopurinol (ZYLOPRIM) 300 MG tablet Take 1 tablet (300 mg) by mouth daily 7/25/2024    cetirizine (ZYRTEC) 10 MG tablet Take 10 mg by mouth every evening 7/24/2024    finasteride (PROPECIA) 1 MG tablet Take 1 tablet (1 mg) by mouth daily 7/25/2024    losartan (COZAAR) 100 MG tablet Take 50 mg by mouth daily 7/25/2024    metoprolol tartrate (LOPRESSOR) 25 MG tablet Take 0.5 tablets (12.5 mg) by mouth 2 times daily 7/25/2024 at am x1    potassium chloride pushpa ER (KLOR-CON M20) 20 MEQ CR tablet Take 3 tablets (60 mEq) by mouth 2 times daily 7/25/2024 at am x 40 meq    predniSONE (DELTASONE) 5 MG tablet Take 8 tablets (40 mg) by mouth daily for 10 days, THEN 4 tablets (20 mg) daily for 30 days, THEN 2 tablets (10 mg) daily for 45 days, THEN 1 tablet (5 mg) daily for 60 days. 7/25/2024 at 10 mg    psyllium (METAMUCIL/KONSYL) capsule Take 1-2 capsules by mouth daily 7/24/2024    rivaroxaban ANTICOAGULANT (XARELTO ANTICOAGULANT) 20 MG TABS tablet Take 1 tablet (20 mg) by mouth daily (with dinner) 7/24/2024 at pm    spironolactone (ALDACTONE) 25 MG tablet Take 1 tablet (25 mg) by mouth daily 7/25/2024 at am    sulfamethoxazole-trimethoprim (BACTRIM DS) 800-160 MG tablet Take 1 tablet by mouth daily while on steroids 7/25/2024    torsemide (DEMADEX) 20 MG tablet Take  4 tablets (80 mg) by mouth daily 7/25/2024 at am

## 2024-07-25 NOTE — Clinical Note
Patient education provided.  03-27 Na138 mmol/L Glu 96 mg/dL K+ 4.3 mmol/L Cr  0.70 mg/dL BUN 12 mg/dL Phos 3.4 mg/dL Alb 3.4 g/dL PAB n/a

## 2024-07-25 NOTE — PROGRESS NOTES
Transfer Type: [facility]  Transfer Triage Note    Date of call: 07/25/24  Time of call: 1:06 PM    Current Patient Location:  Home   Current Level of Care: Outpatient    Vitals: BP:125/88 HR: 79   Diagnosis: HF exacerbation   Reason for requested transfer: Further diagnostic work up, management, and consultation for specialized care   Isolation Needs: None    Care everywhere has been updated and reviewed: Yes  Necessary images have been sent through PACS: Yes    If patient is transferring for specialty care or specific procedure, the specialist required has participated in the transfer call and agreed with need for transfer and anticipated timeline: No    Transfer accepted: Yes  Stability of Patient: Patient is vitally stable, with no critical labs, and will likely remain stable throughout the transfer process  Is the patient appropriate for [facility]?   Level of Care Needed: IMC  Telemetry Needed:  Cardiac Telemetry  Expected Time of Arrival for Transfer: 8-24 hours  Arrival Location:  [facility]     74 Y/O M with hx of HFpEF, AF on DOAC s/p X3 ablation, Aortic stenosis, HTN, BOWEN on PAP. Hx Amiodarone induced Pneumonitis. 3 months ago repeat ablation and admitted for AHRF for 13 days then 2/2 recurrent pneumonitis even off Amio. Since then on steroids taper. Due to that has had HFpEF and continued A fib. On high dose Torsemide and Spironolactone. He had Bumex on the infusion clinic. Now following up with increased weight and in exacerbation. Will need [facility] admission with cards consult.       ANANYA YU MD

## 2024-07-25 NOTE — LETTER
7/25/2024    Ranjan Courtney MD  [address] [facility] [address]    RE: Mike Schultz       Dear Colleague,     I had the pleasure of seeing Mike Schultz in the [facility].  [facility]    I had the pleasure of seeing Brayden when he came for follow up of AFib and HF.  This 73 year old sees Dr. Day for his history of:    1. H/o persistent AFib. EF 50-55% 1/2022 - first dx'd in 2012. Failed flecainide (and noted scar on MRI). Underwent PVI and SVC isolation 11/2017. Repeat PVI and CTI for inducible AFlutter 4/2019. Started on Amio with DCCV 11/19/2020 for recurrent AFib in setting of increased alcohol use.   Amiodarone decreased 1/2021; back in persistent AFib 12/2021 and amiodarone stopped as felt asx'c.  This was restarted 8/2022 prior to DCCV 8/22/2022. Amiodarone stopped again 1/2023. AFib restarted 7/2023 after EtOH use, now s/p DCCV (unsuccessful 9/13), then loaded with amiodarone s/p successful DCCV 10/6/2023.  Dr. Day recommended repeat AFib ablation if he started having AFib without it being triggered by EtOH. Amiodarone stopped 11/2023 d/t discovery of pneumonitis.  Recurrent AF noted after stopping amiodarone, now s/p repeat ablation isolation of posterior LA with linear ablation, reisolation of reconnected PVI, and empiric CTI ablation for inducible atrial flutter) with Dr. Day 5/2/2024.  Unfortunately, hospitalized in follow-up for acute respiratory failure, which did not seem to respond to diuresis and pulmonology restarted high dose steroid therapy due to relapse of inflammatory process.  2. Moderate Aortic Stenosis   3. Benign Essential HTN   4. BOWEN  - on CPAP  5. Myocardiac Scar noted on cardiac MRI 10/2017, consistent with prior MI in the left Cx territory. EF 51%.  Avoiding Class Ic AAD use  6. H/o Alcohol dependence - no EtOH since 2017, then again 3-4 months in early 2022 after he was noted to be back in AFib.Back in AFib with EtOH use 7/2023  7.  Chronic AC  On Xarelto for CHADSVASc 4 (HFpEF, HTN, age, aortic plaquing)  8. Bell's Palsy  9. Lung nodules- noted on CTA to follow-up on aorta 8/2023.  Last CT 3/2024 showed improvement but not complete resolution of pulmonary nodules on CT.  Sees Dr. Mckeon  10. HFpEF - hospitalized 2/2024 and following ablation 5/2024 for recurrent HFpEF in setting of persistent AF, prednisone use, moderate aortic stenosis.      Last Visit & Interval History:  I saw Brayden 5/2024 and we reviewed hospitalization following his ablation for recurrent HFpEF and relapse of inflammatory process with bilateral pulmonary infiltrates.  He had been discharged 5/16 @ 278# on 1 L O2 on metolazone 2.5 daily, prednisone taper, metoprolol tartrate 12.5  BID, losartan 50, KCl 40  BID, Xarelto 20, and torsemide 80 AM/40PM.  Follow-up blood work just a few days later 5/21 showed hypokalemia 3.3 and creatinine that jumped from 1.21 discharged after 3.1.  This resulted in many medication holds and when I saw him 5/24, he was feeling a bit better.  Unfortunately, he has continued to complain of significant weight gain (~309#) despite increasing doses of torsemide, and addition of spironolactone. He remains on prednisone and unfortunately, his pulmonology follow-up with CT was moved from last week to 8/2024.    He underwent diuresis yesterday with the infusion clinic 7/23/2024 given weight 312# with increasing DELEON/edema.  At that time, on torsemide 60, spironolactone 25, KCl 40 mEq BID, losartan 50 mg daily, and metoprolol tartrate 12.5 mg BID.  He was given Bumex 2 mg IVP, then 0.5 mg/h x 4 h and Diuril 250 mg a few hours later. When he was discharged, weight was 310# but Marcos suspected he would likely continue to diuresis overnight given he had not had much urine output while there. Torsemide was increased to 80 mg daily and KCl increased to 60 BID. Repeat BMP/NTpBNP ready for next week.    He remains in AF, which is typically a trigger for his  "HFpEF, which has been difficult to treat given inability to use amiodarone (pneumonitis requiring high-dose prednisone), and scar on MRI.  Dr. Day notes consideration of hospitalization for dofetilide/DCCV once his fluid status gets under better control.      Today's Visit:  Unfortunately, Brayden feels no better after his infusion clinic yesterday (all told, received 4 mg Bumex and Diuril 250 mg).  In fact, weight actually went up to 313 today, and he has not really had a significant diuretic response.    He continues to note shortness of breath, significant DELEON, orthopnea and edema making it difficult to walk.  We again discussed that his HFpEF is multifactorial, due to recurrent AF, chronic prednisone use for pneumonitis, and obesity/deconditioning (as he is not been able to do anything for the last few months because of shortness of breath).    Currently, he remains on his prednisone taper.  He is taking 10 mg daily now, and anticipates cutting this to 5 mg daily in the coming weeks.  His pulmonology appointment and CT scan were moved to 8/2024 so I am not sure that his current status of pneumonitis.    He increased his torsemide to 80 mg daily and KCl 60 BID starting today.  He remains on spironolactone 25 mg daily, metoprolol tartrate 12.5 mg BID and losartan 50 mg daily.       VITALS:  Vitals: /88 (BP Location: Right arm, Patient Position: Sitting)   Pulse 79   Ht 1.727 m (5' 8\")   Wt 142 kg (313 lb 1.6 oz)   SpO2 95%   BMI 47.61 kg/m      Diagnostic Testing:  EKG 5/22/2024 AFib 84 bpm on metoprolol tartrate 12.5 mg BID  Echo 5/6/2024 LVEF 55-60%.  Mild LVH.  Moderate aortic stenosis with mean gradient 35 mmHg.  G3 DD  Nuclear stress test 5/8/2024 small area of mild ischemia in apical segments, with small area of nontransmural infarct in basal inferolateral region with mild degree of iva-infarct ischemia in basal/mid inferior wall.  Thought this was possibly due to body habitus  LVEF 55% with G3 " DD.  No obvious RWMA.  Normal RV function.  Severe LAE.  Mild MAC with 1+ MR.  Heavily calcified aortic valve with 1+ AI and moderate aortic stenosis.  V-max 3.7 m/s, mean gradient 35 mmHg, MARILYN 1.1 cm , DI 0.26, SVI 44 cc/m .  Ascending aortic dilatation 4.0 cm  Echocardiogram 2/11/2024 LVEF 55-60%.  Normal RV.  Moderate CHARBEL.  Mild MAC.  1+ MR.  Moderate aortic stenosis.  Ascending aortic dilatation (4.0 cm)   ZioPatch 11/24-25/2023 (stopped early d/t hospitalization) NSR 71 bpm. Occasional ectopy. No AFIb seen.  Echo 9/6/2023 showed LVEF 49%. Nl RV. Nl LA size. 1+MR. Mod AoS with mean gradient 16mmHg, Vmax 2.7 m/s, DI 0.31  Aorta 8/2023 with aneuryamsl dilation of suprarenal aorta (3.6x3.8). follow-up CTA showed no aneurysm  EKG 7/2021 showed sinus bradycardia 45 bpm.  Nonspecific ST-T wave changes  Cardiac MRI 10/2017 showed an EF of 51% with mild hypokinesis involving the basa inferolateral segment  Component      Latest Ref Rng 7/17/2024  9:22 AM 7/24/2024  11:41 AM 7/24/2024  3:07 PM 7/25/2024  10:48 AM   Sodium      135 - 145 mmol/L 136  136  136  137    Potassium      3.4 - 5.3 mmol/L 4.0  3.5  3.8  3.5    Chloride      98 - 107 mmol/L 94 (L)    96 (L)    Carbon Dioxide (CO2)      22 - 29 mmol/L 28    32 (H)    Anion Gap      7 - 15 mmol/L 14    9    Urea Nitrogen      8.0 - 23.0 mg/dL 39.1 (H)  33.7 (H)  33.4 (H)  31.1 (H)    Creatinine      0.67 - 1.17 mg/dL 1.95 (H)  1.53 (H)  1.56 (H)  1.56 (H)    GFR Estimate      >60 mL/min/1.73m2 36 (L)  48 (L)  47 (L)  47 (L)    Calcium      8.8 - 10.4 mg/dL 9.2    9.2    Glucose      70 - 99 mg/dL 124 (H)    136 (H)      Component      Latest Ref Rng 7/24/2024  11:41 AM 7/24/2024  3:07 PM   Magnesium      1.7 - 2.3 mg/dL 1.6 (L)  2.0           Plan:  1.  Inpatient admission for diuresis, possible R/L heart cath (was recommended to do as OP once he was euvolemic following his last hospitalization 5/2024).  2.  Note plan is for dofetilide loading/DCCV.  This has not  been undertaken yet due to volume status and renal insufficiency.      Assessment/Plan:    Persistent atrial fibrillation  As above, s/p recurrent atrial fibrillation ablation 5/2/2024, PV potentials and LA posterior wall with empiric ablation of CTI  Admitted shortly thereafter for acute hypoxic respiratory failure, thought due to HFpEF and recurrent pulmonary fibrosis/inflammation  He went back into AF 5/22/2024 by EKG in the setting of renal failure, high-dose prednisone use, and SOB    Discussed at length with Dr. Day.  Unfortunately, very challenging case given inability to tolerate AF (heart failure, even with good HR control), pulmonary fibrosis thought due to amiodarone use, which could also occur with sotalol.  He cannot use class Ic antiarrhythmic therapies due to known infarct pattern/scar on MRI and defect on stress test 5/2024.  Dr. Day has recommended dofetilide once renal function normalizes, with admission/cardioversion    Remains on AC with Xarelto.  If CrCl does not improve, will require lower dose    PLAN:  Reviewed plan for dofetilide with admission/cardioversion in an effort to restore and maintain SR.  Unfortunately, he does not tolerate AF, with heart failure symptoms, even when HR is controlled.  See Dr. Day 9/2024 post AF ablation as previously arranged.  Continue Xarelto.  Watch creatinine clearance, as may need to decrease dose    Respiratory failure, pneumonitis  As above, did not respond as expected to diuretic therapy and Pulmonology saw him and restarted high-dose prednisone therapy with prolonged taper and prophylactic Bactrim back in 5/2024.  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    Now on 10 mg of prednisone.  I believe he switches to 5 mg (1 tablet) in the coming weeks.  He is no longer on  O2    PLAN:  Pulmonology with PFTs and updated CT now rescheduled for 8/2024.  May benefit from seeing them in the hospital if his DELEON does not improve despite diuresis.      HFpEF  Admitted following ablation 5/2024 due to increase in shortness of breath and weight gain.  We had tried increasing diuretic therapy as an outpatient, but this did not improve his symptoms.  He was diuresed aggressively in the hospital and thought he did improve somewhat, Pulmonology got involved as he remained short of breath with abnormal CT chest.  Found to have relapsing pulmonary inflammation and prednisone restarted  He was diuresed on a big dose of diuretics (metolazone 2.5 mg daily, torsemide 80 AM/40 PM).  BMP on admit showed stable creatinine 1.2, but just 5 days later 5/21, he had multiple electrolyte abnormalities and creatinine up to 3.11 and many meds required holding  We've followed BMP closely with medication adjustments almost weekly, but has remained in HFpEF with complaints of increasing weight, increasing SOB, and edema  He has tried increasing doses of torsemide, every other day metolazone and remains on spironolactone    Underwent infusion clinic treatment yesterday with Bumex 2 mg push and 0.5 mg/h x 4 hours with Diuril 250 mg, and despite this, saw no improvement in weight or symptoms.  No significant diuretic response.    Discussed extensively with infusion clinic NP/CORE NP.  Given he saw no response to infusion clinic and remains >30# up from hospital discharge 5/2024 (278#), recommended for hospital admission for IV diuresis    PLAN:  Hospital admission for IV diuresis  While he was in the hospital 5/2024, a R/L Heart Cath recommended once stabilized and lung inflammation has improved.  He understands this may be able to be done while hospitalized once he is more euvolemic, but note he is still on prednisone and pulmonary follow-up is not scheduled until 8/2024  Watch renal function closely.  As above, had  significant kidney injury just 4 days after discharge following his last admission.  Blood work done today is stable, with creatinine 1.5  Will continue to follow low-sodium diet  We expect improvement with getting       The longitudinal plan of care for the diagnosis(es)/condition(s) as documented were addressed during this visit. Due to the added complexity in care, I will continue to support Brayden in the subsequent management and with ongoing continuity of care.    Total time spent face to face with the patient was >45 minutes. More than 50% of the time spent with the patient involved counseling and/or coordinating care. Other items discussed included, but were not limited to: prognosis, differential diagnosis, risks/benefits of treatment, instructions regarding medication and importance of compliance and follow up, risk reduction as well as discussion and coordination of care with the other health care providers and patient's care giver (Katty)          Crystal Campos PA-C, MSPAS      No orders of the defined types were placed in this encounter.    No orders of the defined types were placed in this encounter.    There are no discontinued medications.      Encounter Diagnoses   Name Primary?    Persistent atrial fibrillation (H)     Essential hypertension     Hypokalemia        CURRENT MEDICATIONS:  Current Outpatient Medications   Medication Sig Dispense Refill    allopurinol (ZYLOPRIM) 300 MG tablet Take 1 tablet (300 mg) by mouth daily 90 tablet 4    cetirizine (ZYRTEC) 10 MG tablet Take 10 mg by mouth as needed for allergies      finasteride (PROPECIA) 1 MG tablet Take 1 tablet (1 mg) by mouth daily 90 tablet 4    losartan (COZAAR) 100 MG tablet Take 50 mg by mouth daily      metoprolol tartrate (LOPRESSOR) 25 MG tablet Take 0.5 tablets (12.5 mg) by mouth 2 times daily 90 tablet 4    potassium chloride pushpa ER (KLOR-CON M20) 20 MEQ CR tablet Take 3 tablets (60 mEq) by mouth 2 times daily 270 tablet 2     "predniSONE (DELTASONE) 5 MG tablet Take 8 tablets (40 mg) by mouth daily for 10 days, THEN 4 tablets (20 mg) daily for 30 days, THEN 2 tablets (10 mg) daily for 45 days, THEN 1 tablet (5 mg) daily for 60 days. 350 tablet 0    rivaroxaban ANTICOAGULANT (XARELTO ANTICOAGULANT) 20 MG TABS tablet Take 1 tablet (20 mg) by mouth daily (with dinner) 90 tablet 3    spironolactone (ALDACTONE) 25 MG tablet Take 1 tablet (25 mg) by mouth daily 90 tablet 2    sulfamethoxazole-trimethoprim (BACTRIM DS) 800-160 MG tablet Take 1 tablet by mouth daily while on steroids 30 tablet 3    torsemide (DEMADEX) 20 MG tablet Take 4 tablets (80 mg) by mouth daily 360 tablet 2       ALLERGIES     Allergies   Allergen Reactions    Amiodarone Shortness Of Breath     Suspected amiodarone toxicity involving lungs         Review of Systems:  Skin:  Negative     Eyes:  Positive for visual blurring  ENT:  Negative    Respiratory:  Positive for sleep apnea;dyspnea on exertion  Cardiovascular:  Negative for;palpitations;chest pain Positive for;exercise intolerance;fatigue;lightheadedness;edema;heaviness  Gastroenterology: Negative for melena;hematochezia  Genitourinary:  Positive for nocturia  Musculoskeletal:  Negative for muscular weakness  Neurologic:  Positive for    Psychiatric:  Negative    Heme/Lymph/Imm:  Negative    Endocrine:  Negative      Physical Exam:  Vitals: /88 (BP Location: Right arm, Patient Position: Sitting)   Pulse 79   Ht 1.727 m (5' 8\")   Wt 142 kg (313 lb 1.6 oz)   SpO2 95%   BMI 47.61 kg/m      Constitutional:  cooperative, alert and oriented, well developed, well nourished, in no acute distress        Skin:  warm and dry to the touch, no apparent skin lesions or masses noted        Head:  normocephalic, no masses or lesions        Eyes:  pupils equal and round;conjunctivae and lids unremarkable;sclera white        ENT:      Slight R mouth droopiness - [place] Palsy    Neck:  JVP normal JVP >12;hepatojugular reflux  "     Chest:  normal breath sounds, clear to auscultation, normal A-P diameter, normal symmetry, normal respiratory excursion, no use of accessory muscles        Cardiac:   irregularly irregular rhythm       systolic ejection murmur;grade 2        Abdomen:  abdomen soft obese      Vascular: pulses full and equal                                      Extremities and Back:  no deformities, clubbing, cyanosis, erythema observed erythema;stasis pigmentation      Neurological:  no gross motor deficits            PAST MEDICAL HISTORY:  Past Medical History:   Diagnosis Date    (HFpEF) heart failure with preserved ejection fraction (H)     Acute gouty arthritis     Alcohol use disorder in remission     Amiodarone pulmonary toxicity     Aortic stenosis     Basal cell carcinoma     Of the nose    Bell's palsy     Herpes simplex without mention of complication     Hyperlipidemia     Hypertension 2000    Persistent atrial fibrillation (H)     persistent, DCCV 12/2017, ablation 11/6/17    Pulmonary nodules     Sleep apnea     CPAP       PAST SURGICAL HISTORY:  Past Surgical History:   Procedure Laterality Date    ABLATION OF DYSRHYTHMIC FOCUS  2018, 04/12/2019    Procedure: EP Ablation Focal AFIB;  Surgeon: Fady Day MD;  Location:  HEART CARDIAC CATH LAB    ACHILLES TENDON SURGERY  1997    ANESTHESIA CARDIOVERSION N/A 2/26/2019    Procedure: ANESTHESIA CARDIOVERSION (CONNER);  Surgeon: GENERIC ANESTHESIA PROVIDER;  Location:  OR    ANESTHESIA CARDIOVERSION N/A 11/19/2020    Procedure: ANESTHESIA, FOR CARDIOVERSION (dr campbell);  Surgeon: GENERIC ANESTHESIA PROVIDER;  Location:  OR    ANESTHESIA CARDIOVERSION N/A 8/22/2022    Procedure: CARDIOVERSION;  Surgeon: GENERIC ANESTHESIA PROVIDER;  Location:  OR    ANESTHESIA CARDIOVERSION N/A 9/13/2023    Procedure: Anesthesia cardioversion;  Surgeon: GENERIC ANESTHESIA PROVIDER;  Location:  OR    ANESTHESIA CARDIOVERSION N/A 10/6/2023    Procedure: Anesthesia cardioversion;   Surgeon: GENERIC ANESTHESIA PROVIDER;  Location:  OR    BASAL CELL CARCINOMA EXCISION Right     nose, took cartilage from inner ear.    CARDIOVERSION  2019    cardioversion for atr fib    COLONOSCOPY      COLONOSCOPY N/A 3/22/2023    Procedure: COLONOSCOPY, FLEXIBLE, WITH LESION REMOVAL USING SNARE;  Surgeon: Anne Lucero MD;  Location:  GI    EP ABLATION FOCAL AFIB N/A 2019    Procedure: EP Ablation Focal AFIB;  Surgeon: Fady Day MD;  Location:  HEART CARDIAC CATH LAB    EP ABLATION FOCAL AFIB N/A 2024    Procedure: Ablation Atrial Fibrilation;  Surgeon: Fady Day MD;  Location:  HEART CARDIAC CATH LAB    HERNIA REPAIR, UMBILICAL  2012    Procedure: HERNIORRHAPHY UMBILICAL;  UMBILICAL HERNIA REPAIR;  Surgeon: Ra Crocker MD;  Location: [facility]    HERNIORRHAPHY UMBILICAL  2012    Procedure: HERNIORRHAPHY UMBILICAL;  UMBILICAL HERNIA REPAIR;  Surgeon: Ra Crocker MD;  Location: [facility]    ORTHOPEDIC SURGERY      ORTHOPEDIC SURGERY      OTHER SURGICAL HISTORY Right 2017    total KNEE ARTHROSCOPY     TOTAL KNEE ARTHROPLASTY Left 2018    ZZC NONSPECIFIC PROCEDURE  1997    achilles tendon    ZZC NONSPECIFIC PROCEDURE      knees, arthroscopy    ZZC NONSPECIFIC PROCEDURE      basal cell skin ca, nose    ZZC NONSPECIFIC PROCEDURE      flex sig; colonoscopy due 3/2008    ZZC NONSPECIFIC PROCEDURE      cardioversion for atr fib    ZZC TOTAL KNEE ARTHROPLASTY Left 2018    Dr. Malick Suarez       FAMILY HISTORY:  Family History   Problem Relation Age of Onset    Family History Negative Mother     Heart Disease Father         decesased at 42 - [place]    Heart Disease Sister          MI    Lipids Sister     Lipids Sister     No Known Problems Sister     Heart Disease Brother          MI    Heart Disease Brother         CABG AT 49    Lung Cancer No family hx of        SOCIAL HISTORY:  Social History     Socioeconomic History     Marital status: Single     Spouse name: None    Number of children: None    Years of education: None    Highest education level: None   Tobacco Use    Smoking status: Former     Current packs/day: 0.00     Average packs/day: 1 pack/day for 34.6 years (34.6 ttl pk-yrs)     Types: Cigarettes     Start date:      Quit date: 2011     Years since quittin.9     Passive exposure: Past    Smokeless tobacco: Never    Tobacco comments:     quit    Vaping Use    Vaping status: Never Used   Substance and Sexual Activity    Alcohol use: Not Currently    Drug use: Never    Sexual activity: Not Currently     Partners: Female     Birth control/protection: None   Social History Narrative    Worked in retail () and insurance sales     Spends some time on [place] on [place]                 Thank you for allowing me to participate in the care of your patient.      Sincerely,     Natalie Campos PA-C     [facility]  cc:   Natalie Campos PA-C  [address]  [place] [address]

## 2024-07-25 NOTE — PROGRESS NOTES
Writer called patient at [phone] about admission to the heart center. He stated he would be in sometime in the next 2 hours.

## 2024-07-25 NOTE — PROVIDER NOTIFICATION
Discussed with Crystal Campos, robyn, pt does not require [facility] status as noted in Dr. Oliver's note, continue to monitor.

## 2024-07-26 ENCOUNTER — APPOINTMENT (OUTPATIENT)
Dept: OCCUPATIONAL THERAPY | Facility: CLINIC | Age: 74
DRG: 286 | End: 2024-07-26
Attending: NURSE PRACTITIONER
Payer: COMMERCIAL

## 2024-07-26 LAB
ANION GAP SERPL CALCULATED.3IONS-SCNC: 11 MMOL/L (ref 7–15)
ANION GAP SERPL CALCULATED.3IONS-SCNC: 12 MMOL/L (ref 7–15)
ANION GAP SERPL CALCULATED.3IONS-SCNC: 13 MMOL/L (ref 7–15)
ATRIAL RATE - MUSE: 73 BPM
BUN SERPL-MCNC: 39.3 MG/DL (ref 8–23)
BUN SERPL-MCNC: 40.2 MG/DL (ref 8–23)
BUN SERPL-MCNC: 42 MG/DL (ref 8–23)
CALCIUM SERPL-MCNC: 8.9 MG/DL (ref 8.8–10.4)
CALCIUM SERPL-MCNC: 9 MG/DL (ref 8.8–10.4)
CALCIUM SERPL-MCNC: 9.3 MG/DL (ref 8.8–10.4)
CHLORIDE SERPL-SCNC: 90 MMOL/L (ref 98–107)
CHLORIDE SERPL-SCNC: 91 MMOL/L (ref 98–107)
CHLORIDE SERPL-SCNC: 92 MMOL/L (ref 98–107)
CREAT SERPL-MCNC: 1.67 MG/DL (ref 0.67–1.17)
CREAT SERPL-MCNC: 1.69 MG/DL (ref 0.67–1.17)
CREAT SERPL-MCNC: 1.76 MG/DL (ref 0.67–1.17)
DIASTOLIC BLOOD PRESSURE - MUSE: NORMAL MMHG
EGFRCR SERPLBLD CKD-EPI 2021: 41 ML/MIN/1.73M2
EGFRCR SERPLBLD CKD-EPI 2021: 43 ML/MIN/1.73M2
EGFRCR SERPLBLD CKD-EPI 2021: 43 ML/MIN/1.73M2
ERYTHROCYTE [DISTWIDTH] IN BLOOD BY AUTOMATED COUNT: 16.3 % (ref 10–15)
GLUCOSE SERPL-MCNC: 119 MG/DL (ref 70–99)
GLUCOSE SERPL-MCNC: 141 MG/DL (ref 70–99)
GLUCOSE SERPL-MCNC: 185 MG/DL (ref 70–99)
HCO3 SERPL-SCNC: 30 MMOL/L (ref 22–29)
HCO3 SERPL-SCNC: 31 MMOL/L (ref 22–29)
HCO3 SERPL-SCNC: 32 MMOL/L (ref 22–29)
HCT VFR BLD AUTO: 40.4 % (ref 40–53)
HGB BLD-MCNC: 13.5 G/DL (ref 13.3–17.7)
INTERPRETATION ECG - MUSE: NORMAL
MAGNESIUM SERPL-MCNC: 1.6 MG/DL (ref 1.7–2.3)
MCH RBC QN AUTO: 32.9 PG (ref 26.5–33)
MCHC RBC AUTO-ENTMCNC: 33.4 G/DL (ref 31.5–36.5)
MCV RBC AUTO: 99 FL (ref 78–100)
P AXIS - MUSE: NORMAL DEGREES
PHOSPHATE SERPL-MCNC: 4.1 MG/DL (ref 2.5–4.5)
PLATELET # BLD AUTO: 215 10E3/UL (ref 150–450)
POTASSIUM SERPL-SCNC: 2.8 MMOL/L (ref 3.4–5.3)
POTASSIUM SERPL-SCNC: 3.3 MMOL/L (ref 3.4–5.3)
POTASSIUM SERPL-SCNC: 3.4 MMOL/L (ref 3.4–5.3)
POTASSIUM SERPL-SCNC: 4.2 MMOL/L (ref 3.4–5.3)
PR INTERVAL - MUSE: NORMAL MS
QRS DURATION - MUSE: 84 MS
QT - MUSE: 392 MS
QTC - MUSE: 487 MS
R AXIS - MUSE: 23 DEGREES
RBC # BLD AUTO: 4.1 10E6/UL (ref 4.4–5.9)
SODIUM SERPL-SCNC: 133 MMOL/L (ref 135–145)
SODIUM SERPL-SCNC: 134 MMOL/L (ref 135–145)
SODIUM SERPL-SCNC: 135 MMOL/L (ref 135–145)
SYSTOLIC BLOOD PRESSURE - MUSE: NORMAL MMHG
T AXIS - MUSE: 85 DEGREES
VENTRICULAR RATE- MUSE: 93 BPM
WBC # BLD AUTO: 10.5 10E3/UL (ref 4–11)

## 2024-07-26 PROCEDURE — 84100 ASSAY OF PHOSPHORUS: CPT | Performed by: STUDENT IN AN ORGANIZED HEALTH CARE EDUCATION/TRAINING PROGRAM

## 2024-07-26 PROCEDURE — 80048 BASIC METABOLIC PNL TOTAL CA: CPT | Performed by: STUDENT IN AN ORGANIZED HEALTH CARE EDUCATION/TRAINING PROGRAM

## 2024-07-26 PROCEDURE — 250N000012 HC RX MED GY IP 250 OP 636 PS 637: Performed by: NURSE PRACTITIONER

## 2024-07-26 PROCEDURE — 999N000147 HC STATISTIC PT IP EVAL DEFER: Performed by: PHYSICAL THERAPIST

## 2024-07-26 PROCEDURE — 85027 COMPLETE CBC AUTOMATED: CPT | Performed by: NURSE PRACTITIONER

## 2024-07-26 PROCEDURE — 83735 ASSAY OF MAGNESIUM: CPT | Performed by: STUDENT IN AN ORGANIZED HEALTH CARE EDUCATION/TRAINING PROGRAM

## 2024-07-26 PROCEDURE — 210N000002 HC R&B HEART CARE

## 2024-07-26 PROCEDURE — G0463 HOSPITAL OUTPT CLINIC VISIT: HCPCS

## 2024-07-26 PROCEDURE — 97165 OT EVAL LOW COMPLEX 30 MIN: CPT | Mod: GO | Performed by: OCCUPATIONAL THERAPIST

## 2024-07-26 PROCEDURE — 250N000011 HC RX IP 250 OP 636

## 2024-07-26 PROCEDURE — 36415 COLL VENOUS BLD VENIPUNCTURE: CPT | Performed by: STUDENT IN AN ORGANIZED HEALTH CARE EDUCATION/TRAINING PROGRAM

## 2024-07-26 PROCEDURE — 80048 BASIC METABOLIC PNL TOTAL CA: CPT | Performed by: NURSE PRACTITIONER

## 2024-07-26 PROCEDURE — 36415 COLL VENOUS BLD VENIPUNCTURE: CPT | Performed by: NURSE PRACTITIONER

## 2024-07-26 PROCEDURE — 97530 THERAPEUTIC ACTIVITIES: CPT | Mod: GO | Performed by: OCCUPATIONAL THERAPIST

## 2024-07-26 PROCEDURE — 250N000013 HC RX MED GY IP 250 OP 250 PS 637: Performed by: NURSE PRACTITIONER

## 2024-07-26 PROCEDURE — 250N000013 HC RX MED GY IP 250 OP 250 PS 637: Performed by: STUDENT IN AN ORGANIZED HEALTH CARE EDUCATION/TRAINING PROGRAM

## 2024-07-26 PROCEDURE — 99233 SBSQ HOSP IP/OBS HIGH 50: CPT | Performed by: INTERNAL MEDICINE

## 2024-07-26 PROCEDURE — 99223 1ST HOSP IP/OBS HIGH 75: CPT | Mod: FS

## 2024-07-26 PROCEDURE — 250N000009 HC RX 250: Performed by: NURSE PRACTITIONER

## 2024-07-26 RX ORDER — POTASSIUM CHLORIDE 1500 MG/1
40 TABLET, EXTENDED RELEASE ORAL ONCE
Status: COMPLETED | OUTPATIENT
Start: 2024-07-26 | End: 2024-07-26

## 2024-07-26 RX ORDER — MAGNESIUM OXIDE 400 MG/1
400 TABLET ORAL EVERY 4 HOURS
Status: COMPLETED | OUTPATIENT
Start: 2024-07-26 | End: 2024-07-26

## 2024-07-26 RX ORDER — POTASSIUM CHLORIDE 1500 MG/1
20 TABLET, EXTENDED RELEASE ORAL ONCE
Status: COMPLETED | OUTPATIENT
Start: 2024-07-26 | End: 2024-07-26

## 2024-07-26 RX ORDER — CHLOROTHIAZIDE SODIUM 500 MG/1
250 INJECTION INTRAVENOUS ONCE
Status: COMPLETED | OUTPATIENT
Start: 2024-07-26 | End: 2024-07-26

## 2024-07-26 RX ADMIN — PREDNISONE 10 MG: 10 TABLET ORAL at 08:09

## 2024-07-26 RX ADMIN — ALLOPURINOL 300 MG: 300 TABLET ORAL at 08:09

## 2024-07-26 RX ADMIN — Medication 12.5 MG: at 08:09

## 2024-07-26 RX ADMIN — POTASSIUM CHLORIDE 60 MEQ: 1500 TABLET, EXTENDED RELEASE ORAL at 08:09

## 2024-07-26 RX ADMIN — POLYETHYLENE GLYCOL 3350 17 G: 17 POWDER, FOR SOLUTION ORAL at 08:10

## 2024-07-26 RX ADMIN — SULFAMETHOXAZOLE AND TRIMETHOPRIM 1 TABLET: 800; 160 TABLET ORAL at 08:09

## 2024-07-26 RX ADMIN — BUMETANIDE 0.5 MG/HR: 0.25 INJECTION INTRAMUSCULAR; INTRAVENOUS at 09:28

## 2024-07-26 RX ADMIN — POTASSIUM CHLORIDE 20 MEQ: 1500 TABLET, EXTENDED RELEASE ORAL at 13:10

## 2024-07-26 RX ADMIN — FINASTERIDE 1 MG: 1 TABLET, FILM COATED ORAL at 08:59

## 2024-07-26 RX ADMIN — CETIRIZINE HYDROCHLORIDE 10 MG: 10 TABLET, FILM COATED ORAL at 20:27

## 2024-07-26 RX ADMIN — Medication 2 CAPSULE: at 08:58

## 2024-07-26 RX ADMIN — POTASSIUM CHLORIDE 60 MEQ: 1500 TABLET, EXTENDED RELEASE ORAL at 21:48

## 2024-07-26 RX ADMIN — Medication 12.5 MG: at 20:26

## 2024-07-26 RX ADMIN — POTASSIUM CHLORIDE 40 MEQ: 1500 TABLET, EXTENDED RELEASE ORAL at 10:36

## 2024-07-26 RX ADMIN — Medication 400 MG: at 10:36

## 2024-07-26 RX ADMIN — Medication 400 MG: at 13:10

## 2024-07-26 RX ADMIN — CHLOROTHIAZIDE SODIUM 250 MG: 500 INJECTION, POWDER, LYOPHILIZED, FOR SOLUTION INTRAVENOUS at 15:22

## 2024-07-26 RX ADMIN — POTASSIUM CHLORIDE 40 MEQ: 1500 TABLET, EXTENDED RELEASE ORAL at 20:27

## 2024-07-26 RX ADMIN — RIVAROXABAN 20 MG: 20 TABLET, FILM COATED ORAL at 17:50

## 2024-07-26 RX ADMIN — LOSARTAN POTASSIUM 50 MG: 50 TABLET, FILM COATED ORAL at 08:09

## 2024-07-26 ASSESSMENT — ACTIVITIES OF DAILY LIVING (ADL)
ADLS_ACUITY_SCORE: 23
DEPENDENT_IADLS:: INDEPENDENT
ADLS_ACUITY_SCORE: 23

## 2024-07-26 NOTE — PROGRESS NOTES
07/26/24 0851   Appointment Info   Signing Clinician's Name / Credentials (OT) Jovani Ruano EdD, OTR/L   Rehab Comments (OT) initial evaluation   Living Environment   People in Home spouse   Current Living Arrangements house   Home Accessibility stairs to enter home;stairs within home   Number of Stairs, Main Entrance 2   Stair Railings, Main Entrance none   Number of Stairs, Within Home, Primary greater than 10 stairs  (13 to his office)   Stair Railings, Within Home, Primary railings safe and in good condition   Transportation Anticipated family or friend will provide   Living Environment Comments pt works as an  doing commercial insurance.  Works from a home office which is 13 stairs away from he main floor   Self-Care   Usual Activity Tolerance good   Current Activity Tolerance fair   Regular Exercise Other (see comments)  (pt had started Phase II CR at [facility] - has done about 6 sessions, stated he has cancelled out of the last week due to his current issues.)   Equipment Currently Used at Home none   Fall history within last six months no   General Information   Onset of Illness/Injury or Date of Surgery 07/25/24   Referring Physician Natalie Campos PA-C   Patient/Family Therapy Goal Statement (OT) return home, feel better and stronger   Additional Occupational Profile Info/Pertinent History of Current Problem Mike Schultz is a 73 year old male with a PMH significant for persistent atrial fibrillation, HFpEF, moderate aortic stenosis, hypertension, BOWEN, alcohol use disorder, Bell's palsy, lung nodules who was directly admitted from cardiology EP clinic on 7/25/2024 for ongoing decompensated heart failure symptoms despite aggressive outpatient management with primary drivers of his heart failure being his atrial fibrillation and corticosteroid use.  Pt states he has had several ablations and other procedures to work at his Af.   Performance Patterns (Routines, Roles, Habits) Pt reports  decreased energy and endurance, still doing some work from home for his [employer] business   Existing Precautions/Restrictions fall   General Observations and Info Pt in bed, willing to participate   Cognitive Status Examination   Orientation Status orientation to person, place and time   Cognitive Status Comments no issues noted, will continue to monitor.   Visual Perception   Visual Impairment/Limitations corrective lenses for reading   Range of Motion Comprehensive   General Range of Motion no range of motion deficits identified   Strength Comprehensive (MMT)   General Manual Muscle Testing (MMT) Assessment no strength deficits identified   Coordination   Upper Extremity Coordination No deficits were identified   Bed Mobility   Bed Mobility supine-sit;sit-supine   Supine-Sit [place] (Bed Mobility) independent   Sit-Supine [place] (Bed Mobility) independent   Transfers   Transfers sit-stand transfer   Sit-Stand Transfer   Sit-Stand [place] (Transfers) independent   Clinical Impression   Criteria for Skilled Therapeutic Interventions Met (OT) Yes, treatment indicated   OT Diagnosis decreased endurance and activity level for ADLS   OT Problem List-Impairments impacting ADL problems related to;activity tolerance impaired;fear & anxiety   Assessment of Occupational Performance 1-3 Performance Deficits   Identified Performance Deficits decreased endurance for functional mobility, household chores, work chores   Planned Therapy Interventions (OT) ADL retraining;progressive activity/exercise;home program guidelines   Clinical Decision Making Complexity (OT) problem focused assessment/low complexity   Risk & Benefits of therapy have been explained evaluation/treatment results reviewed;care plan/treatment goals reviewed;patient   OT Total Evaluation Time   OT Eval, Low Complexity Minutes (69722) 15   OT Goals   Therapy Frequency (OT) Daily   OT Goals Cardiac Phase 1   OT: Understanding of cardiac education to  maximize quality of life, condition management, and health outcomes Patient   OT: Perform aerobic activity with stable cardiovascular response intermittent;25 minutes;ambulation;treadmill   OT: Functional/aerobic ambulation tolerance with stable cardiovascular response in order to return to home and community environment Greater than 300 feet;Modified independent   OT: Navigation of stairs simulating home set up with stable cardiovascular response in order to return to home and community environment Greater than 10 stairs;Rail on right   Interventions   Interventions Quick Adds Cardiac Rehab;Therapeutic Activity   Therapeutic Activities   Therapeutic Activity Minutes (03873) 25   Symptoms noted during/after treatment fatigue;shortness of breath   Treatment Detail/Skilled Intervention OT/CR: Reviewed pt's activity to date.  Pt reported he had started Phase II CR at [facility], had been to 6-8 sessions.  Working on the NuStep and TM.  Had cancelled his last 4 sessions secondary to his current issues, fatigue, and SOB.  Wants to start back up again once he gets better.  Was familiar with the CHF packet but may not have studied it completely.  Will try to review information in it at a more detialed level to make sure pt follows through consistently.   Ambulation   Duration (minutes) 10 minutes   Effort Scale 6   Symptoms Dyspnea  (pt also complaining that the back of his legs hurt, although not as much as they have been hurting)   Cardiovascular Response Normal   Exercise Details Pt ambulated approximately 180 feet in haskins, one short standing rest break   Vital Signs Details see VS flow sheet   Cardiac Education   Education Provided Stop light tool;Daily weights   Education Packet Given to Patient Yes   All Patient Education Handouts Reviewed with Patient and/or Family No   OT Discharge Planning   OT Plan Continue with timed ambulation, stairs.  Switch to TM as pt gets stronger.  Continued CR education.  Check on smoking  status, if any   OT Discharge Recommendation (DC Rec) home with assist;home with outpatient cardiac rehab   OT Rationale for DC Rec pt has good overall balance and mobility for ambulation, limited with some SOB and complaints of B leg pain when exercising.  Appears motivated to participate and should react to increased activity as he feels better and has less SOB.  Will need to climb 13 stairs at home to work in his office.  Otherwise needs at home are on the main floor.  Anticipate pt will improve has his weight decreases, should also be able to continue with Phase II CR at [facility].  No IP PT needs seen at this time.   OT Brief overview of current status Ambulation in the haskins for medium distances, some understanding of CR and CHF information.   Total Session Time   Timed Code Treatment Minutes 25   Total Session Time (sum of timed and untimed services) 40

## 2024-07-26 NOTE — PROGRESS NOTES
[facility]    Internal Medicine Hospitalist Progress Note  07/26/2024  I evaluated patient on the above date.    Kemla Stout Jr., MD  [phone] (p)  Text Page  Vocera      [New actions/orders today (07/26/2024) are underlined in the assessment and plan. 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    Acute on chronic diastolic CHF exacerbation (HFpEF).  Hypertension (benign essential).  AS, moderate.  [PTA: losartan 50 mg daily; metoprolol 12.5 mg BID; spironolactone 25 mg daily; torsemide 80 mg daily.]  * Echo 5/2024 showed LVEF 55%, no obvious regional wall motion abnormalities; RV borderline dilated with normal systolic function; moderate AS, mild AR.  * On admit, noted primary symptoms of significant weight gain, up to 310 pounds, diffuse edema, dyspnea. Felt to be multifactorial etiology including ongoing corticosteroid use as well as PAF.   * Wt 310 lbs on admit (notes baseline wt around 270 lbs). CXR showed mild bibasilar atelectasis/scar, no pleural effusion, normal pulmonary vascularity. Given bumetanide and chlorothiazide IV dosed and started on bumetanide gtt on admit. Cardiology consulted on admit.   Vitals:    07/25/24 1714 07/26/24 0555   Weight: 140.8 kg (310 lb 6.4 oz) 139.4 kg (307 lb 4.8 oz)     I/O last 3 completed shifts:  In: 2265 [P.O.:2265]  Out: 3050 [Urine:3050]  Recent Labs   Lab 07/26/24  0707 07/25/24  1048 07/24/24  1507  07/24/24  1141    137 136 136   CO2 32* 32*  --   --    BUN 39.3* 31.1* 33.4* 33.7*   CR 1.76* 1.56* 1.56* 1.53*   POTASSIUM 2.8* 3.5 3.8 3.5   - Continue bumetanide gtt; goal is to diurese at least ~30 lbs.  - Continue losartan and metoprolol.  - Hold PTA spironolactone and torsemide for now.  - Continue 2 g sodium diet and 2 L fluid restriction.  - Continue to monitor i/o's, daily wts.  - Appreciate Cardiology help.  - AF management as noted.    Persistent A-fib, felt to be contributing to acute CHF.  * Follows with Dr. Day. H/o multiple antiarrhythmics including flecainide which he failed and amiodarone resulted in pneumonitis. H/o multiple ablations and cardioversion which have subsequently failed.   * Cardiology consulted on admit.  - Continue to monitor on telemetry.  - Continue PTA metoprolol and rivaroxaban.  - Possible antiarrhythmics as per Cardiology.  - Appreciate Cardiology help.    Recent pneumonitis, suspect from relapse of inflammatory lung process (h/o pulmonary toxicity from amiodarone versus other inflammatory process), on steroids.  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 Follow-up CT 3/2024 showed bilateral interstitial infiltrates with likely component of fibrosis. Recent hospitalization 5/2024 with issues including relapsed inflammatory lung process, started on high dose steroids with slow taper by Pulmonology.  * Initial presentation as above. CXR showed mild bibasilar atelectasis/scar, no pleural effusion, normal pulmonary vascularity. O2 sats 90's on RA on admit.  - Continue prednisone 10 mg daily through 8/8/2024; then 5 mg daily starting 8/9/2024 for 60 days.  - Continue sulfamethoxazole-trimethoprim prophylaxis - monitor renal function closely, may need to hold if cr worsens.  - Follow-up with Pulmonology (pending PFTs and repeat CT for 8/2024).    Exertional dyspnea, suspect multifactorial including CHF, recent pneumonitis.  - Continue to  treat other issues as noted.    BOWEN with variable compliance secondary to frequent nocturia.  - Continue CPAP with 3L O2 while sleeping.    ROBIN on CKD likely stage III.  * Baseline creatinine 1.5.  * Cr 1.56 on admit. Started on diuresis as above.  Recent Labs   Lab 07/26/24  0707 07/25/24  1048 07/24/24  1507 07/24/24  1141   CR 1.76* 1.56* 1.56* 1.53*   Estimated Creatinine Clearance: 51.9 mL/min (A) (based on SCr of 1.76 mg/dL (H)).  - Continue to monitor BMP - repeat in am.  - Avoid nephrotoxic medications.    Hypokalemia and hypomagnesemia.  * Continued on scheduled potassium on admit.  Recent Labs   Lab 07/26/24  0707 07/25/24  1048 07/24/24  1507 07/24/24  1141   POTASSIUM 2.8* 3.5 3.8 3.5   MAG 1.6*  --  2.0 1.6*   PHOS 4.1  --   --   --    - Continue scheduled potassium chloride 60 meq BID for now; monitor closely with ROBIN/CKD.  - Continue PRN K and Mg replacement - monitor K levels closely with ROBIN/CKD.    Alcohol use disorder with intermittent spells of sobriety.  * On admit, noted that pt had a few seltzer drinks the past week PTA.  - Cessation encouraged on admit.    Bell's palsy with asymmetric smile.  - Noted.    Lung nodules  * Follows with outpatient Pulmonology.  - Follow-up outpatient.    BPH.  - Continue PTA finasteride.    RLE wound.  * Noted on admit. WOC RN consulted.  - Continue local wound cares per WOC RN.    Severe obesity.  * Body mass index is 46.72 kg/m .  - Needs to continue to pursue aggressive dietary and lifestyle modifications.      Clinically Significant Risk Factors Present on Admission        # Hypokalemia: Lowest K = 2.8 mmol/L in last 2 days, will replace as needed     # Hypomagnesemia: Lowest Mg = 1.6 mg/dL in last 2 days, will replace as needed    # Drug Induced Coagulation Defect: home medication list includes an anticoagulant medication   # Acute Kidney Injury, unspecified: based on a >150% or 0.3 mg/dL increase in last creatinine compared to past 90 day average, will  "monitor renal function  # Hypertension: Noted on problem list  # Acute heart failure with preserved ejection fraction: heart failure noted on problem list, last echo with EF >50%, and receiving IV diuretics        # Severe Obesity: Estimated body mass index is 46.72 kg/m  as calculated from the following:    Height as of an earlier encounter on 7/25/24: 1.727 m (5' 8\").    Weight as of this encounter: 139.4 kg (307 lb 4.8 oz).       # Financial/Environmental Concerns:           Diet: Fluid restriction 2000 ML FLUID (and additional linked orders)  Fluid restriction 2000 ML FLUID  Combination Diet Regular Diet Adult; Moderate Consistent Carb (60 g CHO per Meal) Diet; 2 gm NA Diet; Low Saturated Fat Diet    Prophylaxis: PCD's, ambulation, and is on DOAC.  Palmer Catheter: Not present  Lines: None     Code Status: Full Code    Disposition Plan   Medically Ready for Discharge: Anticipated in 2-4 Days  Expected discharge to home pending stability/improvement/resolution of above acute issues.    Entered: Kemal Stout MD 07/26/2024, 8:03 AM     Communication.      TIME SPENT: I spent approximately 55 minutes bedside and on the inpatient unit today managing the care of patient. Over 50% of my time on the unit was spent in extensive chart review, counseling the patient/family and/or coordinating care regarding services listed in this note.        Interval History   Doing a bit better.  Pleased that wt is a bit down.  Legs feel a bit less swollen.    * Data reviewed today: I reviewed all new labs and imaging over the last 24 hours. I personally reviewed the ECG tracing showing afib with CVR, NSTWA, no acute ischemic changes (from 7/25) .    Physical Exam   Most recent vitals:   , Blood pressure 112/85, pulse 104, temperature 98  F (36.7  C), temperature source Oral, resp. rate 18, weight 139.4 kg (307 lb 4.8 oz), SpO2 96%. O2 Device: None (Room air)    Vitals:    07/25/24 1714 07/26/24 0555   Weight: 140.8 kg (310 lb " 6.4 oz) 139.4 kg (307 lb 4.8 oz)     Vital signs with ranges:  Temp:  [97.6  F (36.4  C)-98.1  F (36.7  C)] 98  F (36.7  C)  Pulse:  [] 104  Resp:  [16-19] 18  BP: ()/(69-99) 112/85  SpO2:  [94 %-99 %] 96 %  Patient Vitals for the past 24 hrs:   BP Temp Temp src Pulse Resp SpO2 Weight   07/26/24 0744 112/85 98  F (36.7  C) Oral 104 18 -- --   07/26/24 0555 115/88 97.7  F (36.5  C) Oral 93 18 96 % 139.4 kg (307 lb 4.8 oz)   07/25/24 2335 99/69 98.1  F (36.7  C) Oral 94 16 99 % --   07/25/24 2100 126/72 97.6  F (36.4  C) Oral -- 18 95 % --   07/25/24 1714 -- -- -- -- -- -- 140.8 kg (310 lb 6.4 oz)   07/25/24 1551 (!) 117/99 98  F (36.7  C) Oral 104 19 94 % --     I/O's last 24 hours:  I/O last 3 completed shifts:  In: 2265 [P.O.:2265]  Out: 3050 [Urine:3050]    Constitutional: awake, alert, oriented, pleasant, conversant   Head:   Eyes:   ENT:   Neck:   Cardiovascular: tachycardic, irregular rhythm, +I/VI systolic murmur, no rubs, no gallops  Lungs: diminished in the bases, no crackles or wheezes  Gastrointestinal/Abdomen: soft, non-tender, non-distended, positive bowel sounds  :   Musculoskeletal:   Skin: bilateral lower extremities with 2+ pitting edema  Neurologic:   Psychiatric:   Hematologic/Lymphatic/Immunologic:        Data    Labs reviewed.  Recent Labs   Lab 07/26/24  0707 07/25/24  1048 07/24/24  1507   WBC 10.5  --   --    HGB 13.5  --   --    MCV 99  --   --      --   --     137 136   POTASSIUM 2.8* 3.5 3.8   CHLORIDE 92* 96*  --    CO2 32* 32*  --    BUN 39.3* 31.1* 33.4*   CR 1.76* 1.56* 1.56*   ANIONGAP 11 9  --    ASHELY 8.9 9.2  --    * 136*  --      Recent Labs   Lab Test 05/05/24  1515 05/05/24  1003 02/11/24  0848 02/11/24  0220 11/28/23  0623   NT-PROBNP, INPATIENT  --  1,202*  --  442 160   TROPONIN T HIGH SENSITIVITY 85* 90* 59* 57*  --      Recent Labs   Lab 07/26/24  0707 07/25/24  1048   * 136*     Recent Labs   Lab Test 02/25/19  0756 10/12/17  0814  "  A1C 5.7* 5.7       No results for input(s): \"INR\", \"[id number]\" in the last 168 hours.  Recent Labs   Lab 07/26/24  0707   WBC 10.5       MICRO:  CULTURES (INCLUDING BLOOD AND URINE):  No lab results found in last 7 days.    Recent Results (from the past 24 hour(s))   XR Chest Port 1 View    Narrative    EXAM: XR CHEST PORT 1 VIEW  LOCATION: [facility]  DATE: 7/25/2024    INDICATION: exertional dyspnea  COMPARISON: 5/5/2024      Impression    IMPRESSION: Mild bibasilar atelectasis/scar. No pleural effusion. Stable cardiac silhouette at the upper limits of normal for size. Normal pulmonary vascularity.       Medications   All medications were reviewed. MAR.    Infusions:  Current Facility-Administered Medications   Medication Dose Route Frequency Provider Last Rate Last Admin    - MEDICATION INSTRUCTIONS -   Does not apply DOES NOT GO TO Courtney Blackmon APRN CNP        bumetanide (BUMEX) 0.25 mg/mL infusion  0.5 mg/hr Intravenous Continuous Courtney Roy APRN CNP 2 mL/hr at 07/25/24 2252 0.5 mg/hr at 07/25/24 2252    Continuing ACE inhibitor/ARB/ARNI from home medication list OR ACE inhibitor/ARB/ARNI order already placed during this visit   Does not apply DOES NOT GO TO Courtney Blackmon APRN CNP        Continuing beta blocker from home medication list OR beta blocker order already placed during this visit   Does not apply DOES NOT GO TO Courtney Blackmon APRN CNP        Patient is already receiving anticoagulation with heparin, enoxaparin (LOVENOX), warfarin (COUMADIN)  or other anticoagulant medication   Does not apply Continuous PRN Courtney Roy APRN CNP         Scheduled Medications:  Current Facility-Administered Medications   Medication Dose Route Frequency Provider Last Rate Last Admin    allopurinol (ZYLOPRIM) tablet 300 mg  300 mg Oral Daily Courtney Roy APRN CNP        cetirizine (zyrTEC) tablet 10 mg  10 mg Oral QPM Courtney Roy APRN CNP   10 mg at " 07/25/24 2139    finasteride (PROPECIA) tablet 1 mg  1 mg Oral Daily Courtney Roy APRN CNP        losartan (COZAAR) tablet 50 mg  50 mg Oral Daily Courtney Roy APRN CNP        metoprolol tartrate (LOPRESSOR) half-tab 12.5 mg  12.5 mg Oral BID Courtney Roy APRN CNP   12.5 mg at 07/25/24 2139    polyethylene glycol (MIRALAX) Packet 17 g  17 g Oral Daily Courtney Roy APRN CNP        potassium chloride pushpa ER (KLOR-CON M20) CR tablet 20 mEq  20 mEq Oral Once Roberto Carlos Rebolledo MD        potassium chloride pushpa ER (KLOR-CON M20) CR tablet 40 mEq  40 mEq Oral Once Roberto Carlos Rebolledo MD        potassium chloride pushpa ER (KLOR-CON M20) CR tablet 60 mEq  60 mEq Oral BID Courtney Roy APRN CNP   60 mEq at 07/25/24 2139    predniSONE (DELTASONE) tablet 10 mg  10 mg Oral Daily Courtney Roy APRN CNP        Followed by    [START ON 8/9/2024] predniSONE (DELTASONE) tablet 5 mg  5 mg Oral Daily Courtney Roy APRN CNP        psyllium (METAMUCIL/KONSYL) capsule 1-2 capsule  1-2 capsule Oral Daily Courtney Roy APRN CNP   1 capsule at 07/25/24 2140    rivaroxaban ANTICOAGULANT (XARELTO) tablet 20 mg  20 mg Oral Daily with supper Courtney Roy APRN CNP   20 mg at 07/25/24 1841    sodium chloride (PF) 0.9% PF flush 3 mL  3 mL Intracatheter Q8H Courtney Roy APRN CNP   3 mL at 07/26/24 0551    [Held by provider] spironolactone (ALDACTONE) tablet 25 mg  25 mg Oral Daily Courtney Roy APRN CNP        sulfamethoxazole-trimethoprim (BACTRIM DS) 800-160 MG per tablet 1 tablet  1 tablet Oral Daily Courtney Roy APRN CNP        [Held by provider] torsemide (DEMADEX) tablet 80 mg  80 mg Oral Daily Courtney Roy APRN CNP         PRN Medications:  Current Facility-Administered Medications   Medication Dose Route Frequency Provider Last Rate Last Admin    - MEDICATION INSTRUCTIONS -   Does not apply DOES NOT GO TO Courtney Blackmon APRN CNP        acetaminophen (TYLENOL) tablet 650  mg  650 mg Oral Q4H PRN Courtney Roy APRN CNP        Or    acetaminophen (TYLENOL) Suppository 650 mg  650 mg Rectal Q4H PRCourtney Diggs APRN CNP        calcium carbonate (TUMS) chewable tablet 1,000 mg  1,000 mg Oral 4x Daily PRN Courtney Roy APRN CNP        Continuing ACE inhibitor/ARB/ARNI from home medication list OR ACE inhibitor/ARB/ARNI order already placed during this visit   Does not apply DOES NOT GO TO Courtney Blackmon APRN CNP        Continuing beta blocker from home medication list OR beta blocker order already placed during this visit   Does not apply DOES NOT GO TO Courtney Blackmon APRN CNP        lidocaine (LMX4) cream   Topical Q1H PRCourtney Diggs APRN CNP        lidocaine 1 % 0.1-1 mL  0.1-1 mL Other Q1H PRCourtney Diggs APRN CNP        melatonin tablet 1 mg  1 mg Oral At Bedtime PRCourtney Diggs APRN CNP        ondansetron (ZOFRAN ODT) ODT tab 4 mg  4 mg Oral Q6H PRCourtney Diggs APRN CNP        Or    ondansetron (ZOFRAN) injection 4 mg  4 mg Intravenous Q6H PRCourtney Diggs APRN CNP        Patient is already receiving anticoagulation with heparin, enoxaparin (LOVENOX), warfarin (COUMADIN)  or other anticoagulant medication   Does not apply Continuous PRCourtney Diggs APRN CNP        prochlorperazine (COMPAZINE) injection 5 mg  5 mg Intravenous Q6H PRCourtney Diggs APRN CNP        Or    prochlorperazine (COMPAZINE) tablet 5 mg  5 mg Oral Q6H PRCourtney Diggs APRN CNP        Or    prochlorperazine (COMPAZINE) suppository 12.5 mg  12.5 mg Rectal Q12H PRCourtney Diggs APRN CNP        senna-docusate (SENOKOT-S/PERICOLACE) 8.6-50 MG per tablet 1 tablet  1 tablet Oral BID PRCourtney Diggs APRN CNP        Or    senna-docusate (SENOKOT-S/PERICOLACE) 8.6-50 MG per tablet 2 tablet  2 tablet Oral BID PRN Courtney Roy APRN CNP        sodium chloride (PF) 0.9% PF flush 3 mL  3 mL Intracatheter q1 min prn Courtney Roy APRN CNP

## 2024-07-26 NOTE — CONSULTS
[facility]    ~Cardiology Consultation~  Primary Cardiologist: Dr. Day    Date of Admission: 7/25/2024  Service Date: 07/26/24    Summary  Mr. Mike Schultz is a very pleasant 73 year old male with a past medical history of  persistent atrial fibrillation, HFpEF, moderate aortic stenosis, hypertension, BOWEN, alcohol use disorder, Bell's palsy, lung nodules who was admitted from the EP clinic after no response after infusion clinic on 7/24/2024. He remains 30# above discharge weight from 5/2024 (278#), continues to have dyspnea on exertion, and lower extremity edema 7/25/2024.     Patient states that for the past month his dyspnea on exertion has become worse. He used to mobilize and his oxygen saturation remained around 91-92%. Within the last month, his oxygen saturation has been within the 80s with exertion and he has noticed an increase in lower extremity edema. This has limited his level of activity. He states he cannot do much. He denies chest pain, palpitations, and orthopnea.     He attended infusion clinic on 7/24/2024. He was given Bumex 2 mg IVP, then 0.5 mg/h x4 h and diuril 250 mg a few hours later. His torsemide was increased to 80 mg daily and KCl was increased to 60 mEq BID.     Telemetry reviewed- atrial fibrillation, rate 90s          Assessment:     Acute on chronic diastolic heart failure, likely triggered by atrial fibrillation   -LVEF: 55% noted on echo 5/2024  -Fluid Status: hypervolemic, possibly 30-40# above EDW, patient states he feels great around 235# but thinks his EDW is 250#, discharge weight from previous admission in 5/2024 was 278#  -Diuretic Regimen: torsemide 80 mg daily   -Ischemic Eval: lexiscan stress test 5/2024 noted below   -GDMT PTA:  Beta blocker: metoprolol tartrate 12.5 mg BID   ACEI/ARB/ARNI: losartan 50 mg daily   Aldactone antagonist: spironolactone 25 mg daily   SGLT2 inhibitor: none     Persistent atrial fibrillation with multiple  attempts at rhythm control, cardioversions, and ablation attempts  [id number] Score 4 (age, CHF, HTN, aortic plaque)  Anticoagulated with Xarelto   Per EP noted on 7/25/2024, EP to consider dofetilide/DCCV once fluid status is under better control. Will consider during current admission     Hypokalemia     Moderate aortic stenosis     History of abnormal nuclear stress test during admission 5/2024  Lexiscan stress test 5/2024- abnormal, small area of ischemia in the apical segments of the left ventricle, small area of nontransmural infarction in the basal inferolateral segments of the left ventricle associated with a mild degree of iva-infarct ischemia involving basal to mid inferior wall   This was thought to possibly be a false positive finding due to body habitus, but recommendation was a left and right heart cath when patients pulmonary status returned to baseline    HTN   BOWEN, compliant with CPAP   Myocardial scar noted on cardiac MRI   History of alcohol dependence              Plan:     Continue bumex gtt at 0.5 mg/hr  One time dose of 250 mg of diuril today   Continue the following cardiology medications   Losartan 50 mg daily   Metoprolol tartrate 12.5 mg BID   Xarelto 20 mg daily   Daily standing weight, strict I/O, daily BMP and repletion of electrolytes   Coordinating outpatient follow up   Cardiology to follow      Plan of care was formulated under direction and guidance of Dr. Lerner.    Genet Desouza PA-C   Physician Assistant   [facility]  Pager: [phone]      Code Status    Full Code    Reason for Consult   Reason for consult: I was asked by Courtney Roy NP to evaluate this patient for acute on chronic heart failure and atrial fibrillation management.    Primary Care Physician   Ranjan Courtney    History of Present Illness   Mr. Mike Schultz is a very pleasant 73 year old male with a past medical history of  persistent atrial fibrillation, HFpEF, moderate aortic stenosis,  hypertension, BOWEN, alcohol use disorder, Bell's palsy, lung nodules who admitted from the EP clinic after no response after infusion clinic on 7/24/2024. He remains 30# above discharge weight from 5/2024 (278#), continues to have dyspnea on exertion, and lower extremity edema 7/25/2024.      Patient states that for the past month his dyspnea on exertion has become worse. He used to mobilize and his oxygen saturation remained around 91-92%. Within the last month, his oxygen saturation has been within the 80s with exertion and he has noticed an increase in lower extremity edema. This has limited his level of activity. He states he cannot do much. He denies chest pain, palpitations, and orthopnea.     He attended infusion clinic on 7/24/2024. He was given Bumex 2 mg IVP, then 0.5 mg/h x4 h and diuril 250 mg a few hours later. His torsemide was increased to 80 mg daily and KCl was increased to 60 mEq BID.     Of note, he was recently hospitalized in 2/2024 for HFpEF exacerbation and again following ablation 5/2024 for recurrent HFpEF in the setting of persistent atrial fibrillation, prednisone use and moderate aortic stenosis.     He was first diagnosed with atrial fibrillation in 2012. He failed flecainide and many attempts at rhythm control with multiple cardioversions and ablation attempts. Amiodarone was discontinued 11/2023 due to pneumonitis. He had recurrent atrial fibrillation after amiodarone was stopped, so underwent repeat ablation with Dr. Day 5/2024. Unfortunately, he was hospitalized in follow up for acute respiratory failure and pulmonology restarted high dose steroid therapy due to relapse of pneumonitis.     Past Medical History   Past Medical History:   Diagnosis Date    (HFpEF) heart failure with preserved ejection fraction (H)     Acute gouty arthritis     Alcohol use disorder in remission     Amiodarone pulmonary toxicity     Aortic stenosis     Basal cell carcinoma     Of the nose    Bell's palsy      Herpes simplex without mention of complication     Hyperlipidemia     Hypertension 2000    Persistent atrial fibrillation (H)     persistent, DCCV 12/2017, ablation 11/6/17    Pulmonary nodules     Sleep apnea     CPAP       Past Surgical History   I have reviewed this patient's surgical history and updated it with pertinent information if needed.  Past Surgical History:   Procedure Laterality Date    ABLATION OF DYSRHYTHMIC FOCUS  2018, 04/12/2019    Procedure: EP Ablation Focal AFIB;  Surgeon: Fady Day MD;  Location:  HEART CARDI[facility] CATH LAB    ACHILLES TENDON SURGERY  1997    ANESTHESIA CARDIOVERSION N/A 2/26/2019    Procedure: ANESTHESIA CARDIOVERSION (CONNER);  Surgeon: GENERIC ANESTHESIA PROVIDER;  Location:  OR    ANESTHESIA CARDIOVERSION N/A 11/19/2020    Procedure: ANESTHESIA, FOR CARDIOVERSION (dr campbell);  Surgeon: GENERIC ANESTHESIA PROVIDER;  Location:  OR    ANESTHESIA CARDIOVERSION N/A 8/22/2022    Procedure: CARDIOVERSION;  Surgeon: GENERIC ANESTHESIA PROVIDER;  Location:  OR    ANESTHESIA CARDIOVERSION N/A 9/13/2023    Procedure: Anesthesia cardioversion;  Surgeon: GENERIC ANESTHESIA PROVIDER;  Location:  OR    ANESTHESIA CARDIOVERSION N/A 10/6/2023    Procedure: Anesthesia cardioversion;  Surgeon: GENERIC ANESTHESIA PROVIDER;  Location:  OR    BASAL CELL CARCINOMA EXCISION Right 2009    nose, took cartilage from inner ear.    CARDIOVERSION  2012, 2019    cardioversion for atr fib    COLONOSCOPY  2008    COLONOSCOPY N/A 3/22/2023    Procedure: COLONOSCOPY, FLEXIBLE, WITH LESION REMOVAL USING SNARE;  Surgeon: Anne Lucero MD;  Location:  GI    EP ABLATION FOCAL AFIB N/A 4/12/2019    Procedure: EP Ablation Focal AFIB;  Surgeon: Fady Day MD;  Location:  HEART CARDI[facility] CATH LAB    EP ABLATION FOCAL AFIB N/A 5/2/2024    Procedure: Ablation Atrial Fibrilation;  Surgeon: Fady Day MD;  Location:  HEART CARDIAC CATH LAB    HERNIA REPAIR, UMBILICAL  08/20/2012     Procedure: HERNIORRHAPHY UMBILICAL;  UMBILICAL HERNIA REPAIR;  Surgeon: Ra Crocker MD;  Location: [facility]    HERNIORRHAPHY UMBILICAL  8/20/2012    Procedure: HERNIORRHAPHY UMBILICAL;  UMBILICAL HERNIA REPAIR;  Surgeon: Ra Crocker MD;  Location: [facility]    ORTHOPEDIC SURGERY      ORTHOPEDIC SURGERY      OTHER SURGICAL HISTORY Right 03/2017    total KNEE ARTHROSCOPY     TOTAL KNEE ARTHROPLASTY Left 03/16/2018    [facility] NONSPECIFIC PROCEDURE  1997    achilles tendon    [facility] NONSPECIFIC PROCEDURE      knees, arthroscopy    [facility] NONSPECIFIC PROCEDURE      basal cell skin ca, nose    [facility] NONSPECIFIC PROCEDURE      flex sig; colonoscopy due 3/2008    [facility] NONSPECIFIC PROCEDURE  2012    cardioversion for atr fib    [facility] TOTAL KNEE ARTHROPLASTY Left 03/16/2018    Dr. Malick Suarez       Prior to Admission Medications   Prior to Admission Medications   Prescriptions Last Dose Informant Patient Reported? Taking?   allopurinol (ZYLOPRIM) 300 MG tablet 7/25/2024  No Yes   Sig: Take 1 tablet (300 mg) by mouth daily   cetirizine (ZYRTEC) 10 MG tablet 7/24/2024  Yes Yes   Sig: Take 10 mg by mouth every evening   finasteride (PROPECIA) 1 MG tablet 7/25/2024  No Yes   Sig: Take 1 tablet (1 mg) by mouth daily   losartan (COZAAR) 100 MG tablet 7/25/2024  Yes Yes   Sig: Take 50 mg by mouth daily   metoprolol tartrate (LOPRESSOR) 25 MG tablet 7/25/2024 at am x1  No Yes   Sig: Take 0.5 tablets (12.5 mg) by mouth 2 times daily   potassium chloride pushpa ER (KLOR-CON M20) 20 MEQ CR tablet 7/25/2024 at am x 40 meq  No Yes   Sig: Take 3 tablets (60 mEq) by mouth 2 times daily   predniSONE (DELTASONE) 5 MG tablet 7/25/2024 at 10 mg  No Yes   Sig: Take 8 tablets (40 mg) by mouth daily for 10 days, THEN 4 tablets (20 mg) daily for 30 days, THEN 2 tablets (10 mg) daily for 45 days, THEN 1 tablet (5 mg) daily for 60 days.   psyllium (METAMUCIL/KONSYL) capsule 7/24/2024  Yes Yes   Sig: Take 1-2 capsules by mouth daily   rivaroxaban  ANTICOAGULANT (XARELTO ANTICOAGULANT) 20 MG TABS tablet 7/24/2024 at pm  No Yes   Sig: Take 1 tablet (20 mg) by mouth daily (with dinner)   spironolactone (ALDACTONE) 25 MG tablet 7/25/2024 at am  No Yes   Sig: Take 1 tablet (25 mg) by mouth daily   sulfamethoxazole-trimethoprim (BACTRIM DS) 800-160 MG tablet 7/25/2024  No Yes   Sig: Take 1 tablet by mouth daily while on steroids   torsemide (DEMADEX) 20 MG tablet 7/25/2024 at am  No Yes   Sig: Take 4 tablets (80 mg) by mouth daily      Facility-Administered Medications: None     Current Facility-Administered Medications   Medication Dose Route Frequency Provider Last Rate Last Admin    - MEDICATION INSTRUCTIONS -   Does not apply DOES NOT GO TO Courtney Blackmon APRN CNP        acetaminophen (TYLENOL) tablet 650 mg  650 mg Oral Q4H PRN Courtney Roy APRN CNP        Or    acetaminophen (TYLENOL) Suppository 650 mg  650 mg Rectal Q4H PRN Courtney Roy APRN CNP        allopurinol (ZYLOPRIM) tablet 300 mg  300 mg Oral Daily Courtney Roy APRN CNP   300 mg at 07/26/24 0809    bumetanide (BUMEX) 0.25 mg/mL infusion  0.5 mg/hr Intravenous Continuous oCurtney Roy APRN CNP 2 mL/hr at 07/26/24 0928 0.5 mg/hr at 07/26/24 0928    calcium carbonate (TUMS) chewable tablet 1,000 mg  1,000 mg Oral 4x Daily PRN Courtney Roy APRN CNP        cetirizine (zyrTEC) tablet 10 mg  10 mg Oral QPM Courtney Roy APRN CNP   10 mg at 07/25/24 2139    Continuing ACE inhibitor/ARB/ARNI from home medication list OR ACE inhibitor/ARB/ARNI order already placed during this visit   Does not apply DOES NOT GO TO Courtney Blackmon APRN CNP        Continuing beta blocker from home medication list OR beta blocker order already placed during this visit   Does not apply DOES NOT GO TO Courtney Blackmon APRN CNP        finasteride (PROPECIA) tablet 1 mg  1 mg Oral Daily Courtney Roy, AYAN CNP   1 mg at 07/26/24 0859    lidocaine (LMX4) cream   Topical Q1H PRN Courtney Roy,  APRN CNP        lidocaine 1 % 0.1-1 mL  0.1-1 mL Other Q1H PRN Courtney Roy APRN CNP        losartan (COZAAR) tablet 50 mg  50 mg Oral Daily Courtney Roy APRN CNP   50 mg at 07/26/24 0809    magnesium oxide (MAG-OX) tablet 400 mg  400 mg Oral Q4H Roberto Carlos Rebolledo MD   400 mg at 07/26/24 1036    melatonin tablet 1 mg  1 mg Oral At Bedtime PRN Courtney Roy APRN CNP        metoprolol tartrate (LOPRESSOR) half-tab 12.5 mg  12.5 mg Oral BID Courtney Roy APRN CNP   12.5 mg at 07/26/24 0809    ondansetron (ZOFRAN ODT) ODT tab 4 mg  4 mg Oral Q6H PRN Courtney Roy APRN CNP        Or    ondansetron (ZOFRAN) injection 4 mg  4 mg Intravenous Q6H PRN Courtney Roy APRN CNP        Patient is already receiving anticoagulation with heparin, enoxaparin (LOVENOX), warfarin (COUMADIN)  or other anticoagulant medication   Does not apply Continuous PRN Courtney Roy APRN CNP        polyethylene glycol (MIRALAX) Packet 17 g  17 g Oral Daily Courtney Roy APRN CNP   17 g at 07/26/24 0810    potassium chloride pushpa ER (KLOR-CON M20) CR tablet 20 mEq  20 mEq Oral Once Roberto Carlos Rebolledo MD        potassium chloride pushpa ER (KLOR-CON M20) CR tablet 60 mEq  60 mEq Oral BID Courtney Roy APRN CNP   60 mEq at 07/26/24 0809    predniSONE (DELTASONE) tablet 10 mg  10 mg Oral Daily Courtney Roy APRN CNP   10 mg at 07/26/24 0809    Followed by    [START ON 8/9/2024] predniSONE (DELTASONE) tablet 5 mg  5 mg Oral Daily Courtney Roy APRN CNP        prochlorperazine (COMPAZINE) injection 5 mg  5 mg Intravenous Q6H PRN Courtney Roy APRN CNP        Or    prochlorperazine (COMPAZINE) tablet 5 mg  5 mg Oral Q6H PRN Courtney Roy APRN CNP        Or    prochlorperazine (COMPAZINE) suppository 12.5 mg  12.5 mg Rectal Q12H PRN Courtney Roy APRN CNP        psyllium (METAMUCIL/KONSYL) capsule 1-2 capsule  1-2 capsule Oral Daily Courtney Roy APRN CNP   2 capsule at 07/26/24 0858     rivaroxaban ANTICOAGULANT (XARELTO) tablet 20 mg  20 mg Oral Daily with supper Courtney Roy APRN CNP   20 mg at 07/25/24 1841    senna-docusate (SENOKOT-S/PERICOLACE) 8.6-50 MG per tablet 1 tablet  1 tablet Oral BID PRN Courtney Roy APRN CNP        Or    senna-docusate (SENOKOT-S/PERICOLACE) 8.6-50 MG per tablet 2 tablet  2 tablet Oral BID PRN Courtney Roy APRN CNP        sodium chloride (PF) 0.9% PF flush 3 mL  3 mL Intracatheter Q8H Courtney Roy APRN CNP   3 mL at 07/26/24 1036    sodium chloride (PF) 0.9% PF flush 3 mL  3 mL Intracatheter q1 min prn Courtney Roy APRN CNP        [Held by provider] spironolactone (ALDACTONE) tablet 25 mg  25 mg Oral Daily Courtney Roy APRN CNP        sulfamethoxazole-trimethoprim (BACTRIM DS) 800-160 MG per tablet 1 tablet  1 tablet Oral Daily Courtney Roy APRN CNP   1 tablet at 07/26/24 0809    [Held by provider] torsemide (DEMADEX) tablet 80 mg  80 mg Oral Daily Courtney Roy APRN CNP         Current Facility-Administered Medications   Medication Dose Route Frequency Provider Last Rate Last Admin    - MEDICATION INSTRUCTIONS -   Does not apply DOES NOT GO TO MAR Courtney Roy APRN CNP        acetaminophen (TYLENOL) tablet 650 mg  650 mg Oral Q4H PRN Courtney Roy APRN CNP        Or    acetaminophen (TYLENOL) Suppository 650 mg  650 mg Rectal Q4H PRN Courtney Roy APRN CNP        allopurinol (ZYLOPRIM) tablet 300 mg  300 mg Oral Daily Courtney Roy APRN CNP   300 mg at 07/26/24 0809    bumetanide (BUMEX) 0.25 mg/mL infusion  0.5 mg/hr Intravenous Continuous Courtney Roy APRN CNP 2 mL/hr at 07/26/24 0928 0.5 mg/hr at 07/26/24 0928    calcium carbonate (TUMS) chewable tablet 1,000 mg  1,000 mg Oral 4x Daily PRN Courtney Roy APRN CNP        cetirizine (zyrTEC) tablet 10 mg  10 mg Oral QPM Courtney Roy APRN CNP   10 mg at 07/25/24 8915    Continuing ACE inhibitor/ARB/ARNI from home medication list OR ACE inhibitor/ARB/ARNI order  already placed during this visit   Does not apply DOES NOT GO TO Courtney Blackmon APRN CNP        Continuing beta blocker from home medication list OR beta blocker order already placed during this visit   Does not apply DOES NOT GO TO Courtney Blackmon APRN CNP        finasteride (PROPECIA) tablet 1 mg  1 mg Oral Daily Courtney Roy APRN CNP   1 mg at 07/26/24 0859    lidocaine (LMX4) cream   Topical Q1H PRN Courtney Roy APRN CNP        lidocaine 1 % 0.1-1 mL  0.1-1 mL Other Q1H PRN Courtney Roy APRN CNP        losartan (COZAAR) tablet 50 mg  50 mg Oral Daily Courtney Roy APRN CNP   50 mg at 07/26/24 0809    magnesium oxide (MAG-OX) tablet 400 mg  400 mg Oral Q4H Roberto Carlos Rebolledo MD   400 mg at 07/26/24 1036    melatonin tablet 1 mg  1 mg Oral At Bedtime PRN Courtney Roy APRN CNP        metoprolol tartrate (LOPRESSOR) half-tab 12.5 mg  12.5 mg Oral BID Courtney Roy APRN CNP   12.5 mg at 07/26/24 0809    ondansetron (ZOFRAN ODT) ODT tab 4 mg  4 mg Oral Q6H PRN Courtney Roy APRN CNP        Or    ondansetron (ZOFRAN) injection 4 mg  4 mg Intravenous Q6H PRN Courtney Roy APRN CNP        Patient is already receiving anticoagulation with heparin, enoxaparin (LOVENOX), warfarin (COUMADIN)  or other anticoagulant medication   Does not apply Continuous PRN Courtney Roy APRN CNP        polyethylene glycol (MIRALAX) Packet 17 g  17 g Oral Daily Courtney Roy APRN CNP   17 g at 07/26/24 0810    potassium chloride pushpa ER (KLOR-CON M20) CR tablet 20 mEq  20 mEq Oral Once Roberto Carlos Rebolledo MD        potassium chloride pushpa ER (KLOR-CON M20) CR tablet 60 mEq  60 mEq Oral BID Courtney Roy APRN CNP   60 mEq at 07/26/24 0809    predniSONE (DELTASONE) tablet 10 mg  10 mg Oral Daily Courtney Roy APRN CNP   10 mg at 07/26/24 0809    Followed by    [START ON 8/9/2024] predniSONE (DELTASONE) tablet 5 mg  5 mg Oral Daily Courtney Roy APRN CNP         prochlorperazine (COMPAZINE) injection 5 mg  5 mg Intravenous Q6H PRN Courtney Roy APRN CNP        Or    prochlorperazine (COMPAZINE) tablet 5 mg  5 mg Oral Q6H PRN Courtney Roy APRN CNP        Or    prochlorperazine (COMPAZINE) suppository 12.5 mg  12.5 mg Rectal Q12H PRN Courtney Roy APRN CNP        psyllium (METAMUCIL/KONSYL) capsule 1-2 capsule  1-2 capsule Oral Daily Courtney Roy APRN CNP   2 capsule at 07/26/24 0858    rivaroxaban ANTICOAGULANT (XARELTO) tablet 20 mg  20 mg Oral Daily with supper Courtney Roy APRN CNP   20 mg at 07/25/24 1841    senna-docusate (SENOKOT-S/PERICOLACE) 8.6-50 MG per tablet 1 tablet  1 tablet Oral BID PRN Courtney Roy APRN CNP        Or    senna-docusate (SENOKOT-S/PERICOLACE) 8.6-50 MG per tablet 2 tablet  2 tablet Oral BID PRN Courtney Roy APRN CNP        sodium chloride (PF) 0.9% PF flush 3 mL  3 mL Intracatheter Q8H Courtney Roy APRN CNP   3 mL at 07/26/24 1036    sodium chloride (PF) 0.9% PF flush 3 mL  3 mL Intracatheter q1 min prn Courtney Roy APRN CNP        [Held by provider] spironolactone (ALDACTONE) tablet 25 mg  25 mg Oral Daily Courtney Roy APRN CNP        sulfamethoxazole-trimethoprim (BACTRIM DS) 800-160 MG per tablet 1 tablet  1 tablet Oral Daily Courtney Roy APRN CNP   1 tablet at 07/26/24 0809    [Held by provider] torsemide (DEMADEX) tablet 80 mg  80 mg Oral Daily Courtney Roy APRN CNP         Allergies   Allergies   Allergen Reactions    Amiodarone Shortness Of Breath     Suspected amiodarone toxicity involving lungs       Social History    reports that he quit smoking about 12 years ago. His smoking use included cigarettes. He started smoking about 47 years ago. He has a 34.6 pack-year smoking history. He has been exposed to tobacco smoke. He has never used smokeless tobacco. He reports that he does not currently use alcohol. He reports that he does not use drugs.    Family History   I have reviewed this  "patient's family history and updated it with pertinent information if needed.  Family History   Problem Relation Age of Onset    Family History Negative Mother     Heart Disease Father         decesased at 42 - MI    Heart Disease Sister          MI    Lipids Sister     Lipids Sister     No Known Problems Sister     Heart Disease Brother          MI    Heart Disease Brother         CABG AT 49    Lung Cancer No family hx of          Review of Systems   A comprehensive review of system was performed and is negative other than that noted in the HPI or here.     Physical Exam   Vital Signs with Ranges  Temp:  [97.6  F (36.4  C)-98.1  F (36.7  C)] 98  F (36.7  C)  Pulse:  [] 98  Resp:  [16-19] 18  BP: ()/(69-99) 122/99  SpO2:  [94 %-99 %] 96 %  307 lbs 4.8 oz  I/O last 3 completed shifts:  In: 2265 [P.O.:2265]  Out: 3050 [Urine:3050]    Constitutional: Appears stated age, well nourished, NAD.  Neck: Supple. JVD difficult to appreciate due to body habitus.   Respiratory: Non-labored. Lungs CTAB.  Cardiovascular: IRR with holosystolic murmur.  Bilateral lower extremities with 3+ edema.   GI: Soft, non-distended, non-tender.  Skin: Warm and dry.   Musculoskeletal/Extremities: Symmetrical movement.  Neurologic: No gross focal deficits. Alert, awake.  Psychiatric: Affect appropriate. Mentation normal.    Data   No lab results found in last 7 days.    Invalid input(s): \"TROPONINIES\"  Recent Labs   Lab 24  0707 24  1048 24  1507   WBC 10.5  --   --    HGB 13.5  --   --    MCV 99  --   --      --   --     137 136   POTASSIUM 2.8* 3.5 3.8   CHLORIDE 92* 96*  --    CO2 32* 32*  --    BUN 39.3* 31.1* 33.4*   CR 1.76* 1.56* 1.56*   GFRESTIMATED 41* 47* 47*   ANIONGAP 11 9  --    ASHELY 8.9 9.2  --    * 136*  --      Recent Labs   Lab Test 23  0955 10/06/21  1759   CHOL 163 173   HDL 39* 38*   LDL 97 103*   TRIG 133 161*     Recent Labs   Lab 24  0707   WBC " "10.5   HGB 13.5   HCT 40.4   MCV 99        No results for input(s): \"PH\", \"PHV\", \"PO2\", \"PO2V\", \"SAT\", \"PCO2\", \"PCO2V\", \"HCO3\", \"HCO3V\" in the last 168 hours.  No results for input(s): \"NTBNPI\", \"NTBNP\" in the last 168 hours.  No results for input(s): \"DD\" in the last 168 hours.  No results for input(s): \"SED\", \"CRP\" in the last 168 hours.  Recent Labs   Lab 07/26/24  0707        No results for input(s): \"TSH\" in the last 168 hours.  No results for input(s): \"COLOR\", \"APPEARANCE\", \"URINEGLC\", \"URINEBILI\", \"URINEKETONE\", \"SG\", \"UBLD\", \"URINEPH\", \"PROTEIN\", \"UROBILINOGEN\", \"NITRITE\", \"LEUKEST\", \"RBCU\", \"WBCU\" in the last 168 hours.    Imaging:  Recent Results (from the past 48 hour(s))   XR Chest Port 1 View    Narrative    EXAM: XR CHEST PORT 1 VIEW  LOCATION: [facility]  DATE: 7/25/2024    INDICATION: exertional dyspnea  COMPARISON: 5/5/2024      Impression    IMPRESSION: Mild bibasilar atelectasis/scar. No pleural effusion. Stable cardiac silhouette at the upper limits of normal for size. Normal pulmonary vascularity.       Echo:  No results found for this or any previous visit (from the past 4320 hour(s)).    Clinically Significant Risk Factors Present on Admission        # Hypokalemia: Lowest K = 2.8 mmol/L in last 2 days, will replace as needed     # Hypomagnesemia: Lowest Mg = 1.6 mg/dL in last 2 days, will replace as needed    # Drug Induced Coagulation Defect: home medication list includes an anticoagulant medication   # Acute Kidney Injury, unspecified: based on a >150% or 0.3 mg/dL increase in last creatinine compared to past 90 day average, will monitor renal function  # Hypertension: Noted on problem list  # Acute heart failure with preserved ejection fraction: heart failure noted on problem list, last echo with EF >50%, and receiving IV diuretics        # Severe Obesity: Estimated body mass index is 46.72 kg/m  as calculated from the following:    Height as of an " "earlier encounter on 7/25/24: 1.727 m (5' 8\").    Weight as of this encounter: 139.4 kg (307 lb 4.8 oz).       # Financial/Environmental Concerns:          Cardiac Arrhythmia: Atrial fibrillation: Persistent  Diastolic acute  Hypokalemia  Other Pulmonary Conditions: Acute interstitial pneumonitis  Chronic Fatigue and Other Debilities: Other reduced mobility          This note was completed in part using Dragon voice recognition software. Although reviewed after completion, some word and grammatical errors may occur.    "

## 2024-07-26 NOTE — CONSULTS
Care Management Initial Consult    General Information  Assessment completed with: Brayden Hubbard  Type of CM/SW Visit: Initial Assessment  Primary Care Provider verified and updated as needed: Yes (Dr. Courtney at [facility])   Readmission within the last 30 days: no previous admission in last 30 days      Reason for Consult: discharge planning  Advance Care Planning:    no ACP documents    Communication Assessment  Patient's communication style: spoken language (English or Bilingual)    Hearing Difficulty or Deaf: no   Wear Glasses or Blind: no    Cognitive  Cognitive/Neuro/Behavioral: WDL                      Living Environment:   People in home: significant other  Katty  [phone]  Current living Arrangements: house ([address] [place] [id number])      Able to return to prior arrangements:    Living Arrangement Comments: 2 steps to enter, 14 steps within the home up to his office    Family/Social Support:  Care provided by:    Provides care for: no one  Marital Status: Lives with Significant Other  Significant Other       Katty  Description of Support System: Supportive, Involved    Support Assessment: Adequate family and caregiver support    Current Resources:   Patient receiving home care services: No  Community Resources: None  Equipment currently used at home: none  Supplies currently used at home: Oxygen Tubing/Supplies (and BiPAP via  [facility])    Employment/Financial:  Employment Status: employed full-time     Employment/ Comments: 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  No    Lifestyle & Psychosocial Needs:  Social Determinants of Health     Food Insecurity: Not on file   Depression: At risk (2/23/2024)    PHQ-2      "PHQ-2 Score: 3   Housing Stability: Not on file   Tobacco Use: Medium Risk (7/25/2024)    Patient History     Smoking Tobacco Use: Former     Smokeless Tobacco Use: Never     Passive Exposure: Past   Financial Resource Strain: Not on file   Alcohol Use: Not At Risk (3/12/2019)    AUDIT-C     Frequency of Alcohol Consumption: Never     Average Number of Drinks: Not on file     Frequency of Binge Drinking: Not on file   Transportation Needs: Not on file   Physical Activity: Not on file   Interpersonal Safety: Not on file   Stress: Not on file   Social Connections: Not on file   Health Literacy: Not on file     Functional Status:  Prior to admission patient needed assistance:   Dependent ADLs:: Independent  Dependent IADLs:: Independent  Assesssment of Functional Status: At functional baseline    Mental Health Status:  Mental Health Status: No Current Concerns       Chemical Dependency Status:  Chemical Dependency Status: No Current Concerns       Values/Beliefs:  Spiritual, Cultural Beliefs, Yazidism Practices, Values that affect care: no          Values/Beliefs Comment: Protestant    Additional Information:  Met with patient in room, introduced self and role in discharge planning. Confirmed the information in the above assessment, please see each section for helpful details.      Pt lives independently in his [place] home (2 steps to enter, 14 steps to his home office; he works full time) with his significant other Katty.  Pt  has established PCP at [facility], Dr. Courtney, and is an active MyChart user.  He has oxygen and CPAP supplies via [facility].   Pt is connected with Specialty providers  cardiology, \"I see them almost weekly\"); he prefers to schedule his own appointments (if PCP is recommended / looks full ok to send message to team to assist in working in).  Pt has clearance from therapy session session while in hospital, per therapy, PT documents \" pt functional mobility is at or near " "baseline \" and OT records \"pt has good overall balance and mobility for ambulation, limited with some SOB and complaints of B leg pain when exercising. Appears motivated to participate and should react to increased activity as he feels better and has less SOB. Will need to climb 13 stairs at home to work in his office. Otherwise needs at home are on the main floor. Anticipate pt will improve has his weight decreases, should also be able to continue with Phase II CR at [facility]. No IP PT needs seen at this time.\"      Pt chart BPA for primary care - care coordination referral was entered.      No further care management intervention anticipated at this time.  Care management signing off.   Please re-consult if further needs arise.    Melanie Carrion RN, BSN, PHN  [facility]  Inpatient Care Management - [facility]  Heart Center CM RN Mobile: [phone] daily 7:30-4:00        "

## 2024-07-26 NOTE — PLAN OF CARE
Brayden is A+OX4, VSS on RA, Tele:Afib CVR, denies pain/sob, affect was calm & pleasant throughout shift, bumex infusing 0.5mg/hr with good output into urinal at bedside, doppler utilized for LE pulses due to +3LE edema, SBA with ambulation, plan is for continuation of diuresis, will continue with POC as ordered, discharge pending.

## 2024-07-26 NOTE — PLAN OF CARE
Physical Therapy: Orders received. Chart reviewed and discussed with evaluating OT.? Physical Therapy not indicated due to pt functional mobility is at or near baseline. OT to continue to see for heart failure education and activity tolerance during LOS.? Pt plan is to return to OP CR at disch. No acute PT needs at this time. Defer discharge recommendations to OT.? Will complete PT order.

## 2024-07-26 NOTE — CONSULTS
"[facility]  WO Nurse Inpatient Assessment     Consulted for: RLE    Summary: Small wound to right lateral shin due to trauma and chronic edema. WO nurse will follow up weekly if patient remains inpatient.     Patient History (according to provider note(s):      \"Mike Schultz is a 73 year old male with a PMH significant for persistent atrial fibrillation, HFpEF, moderate aortic stenosis, hypertension, BOWEN, alcohol use disorder, Bell's palsy, lung nodules who was directly admitted from cardiology EP clinic on 7/25/2024 for ongoing decompensated heart failure symptoms despite aggressive outpatient management with primary drivers of his heart failure being his atrial fibrillation and corticosteroid use.\"    Assessment:      Areas visualized during today's visit: Lower extremities     Wound location: Right lateral shin        Last photo: 7/26  Wound due to: Mixed Etiology: Trauma, chronic edema  Wound history/plan of care: Patient reports he hit his leg with his car door. His legs are swollen at baseline and the skin is fragile. He has a resurfaced wound that is distal to current wound. Has worn compression in the past. Per chart review, patient saw Dr. Cason with the [facility] wound clinic back in April of this year and was instructed to wear SpandaGrip. Will continue this for patient as he is familiar with the product. Mepilex in place on writer's arrival.   Wound base:   Moist pink-red, pulpy tissue ,      Palpation of the wound bed: normal      Drainage: small     Description of drainage: serosanguinous     Measurements (length x width x depth, in cm): 2  x 2  x  0.1 cm      Tunneling: N/A     Undermining: N/A  Periwound skin: Edematous, Hemosiderin staining, and karine      Color: pink and red      Temperature: normal   Odor: none  Pain: denies , none  Pain interventions prior to dressing change: patient tolerated well and slow and gentle cares   Treatment goal: Heal , Drainage " "control, and Protection  STATUS: initial assessment  Supplies ordered: gathered, ordered Polymem 2x4 and SpandaGrip size G, discussed with RN, and discussed with patient       Treatment Plan:     Right lateral shin wound(s): Every 3 days and PRN for loose/soiled dressing  Cleanse with saline or wound cleanser and gauze. Pat dry.  Cover wound with 2x4 Polymem strip (#[id number]). Initial and date dressing change.  Apply SpandaGrip (size G, #[id number]) to both legs from base of toes to below the knee.  Compression can be removed at night and for skin care.    Orders: Written    RECOMMEND PRIMARY TEAM ORDER: None, at this time  Education provided: plan of care and wound progress  Discussed plan of care with: Patient  WOC nurse follow-up plan: weekly  Notify WOC if wound(s) deteriorate.  Nursing to notify the Provider(s) and re-consult the WOC Nurse if new skin concern.    DATA:     Current support surface: Standard  Standard Isoflex gel  Containment of urine/stool: Continent of bladder and Continent of bowel  BMI: Body mass index is 46.72 kg/m .   Active diet order: Orders Placed This Encounter      Combination Diet Regular Diet Adult; Moderate Consistent Carb (60 g CHO per Meal) Diet; 2 gm NA Diet; Low Saturated Fat Diet     Output: I/O last 3 completed shifts:  In: 2265 [P.O.:2265]  Out: 3050 [Urine:3050]     Labs:   Recent Labs   Lab 07/26/24  0707   HGB 13.5   WBC 10.5     Pressure injury risk assessment:   Sensory Perception: 4-->no impairment  Moisture: 4-->rarely moist  Activity: 4-->walks frequently  Mobility: 3-->slightly limited  Nutrition: 3-->adequate  Friction and Shear: 3-->no apparent problem  Ru Score: 21    Gloria Zamora, RN, CWOCN  Please contact via [employer] at name or group \"[facility] nurse\"- M-F 8A-4P  Leave VM @ *[phone] for non-urgent needs. Checked occasionally M-F.    "

## 2024-07-26 NOTE — PROGRESS NOTES
Pt A&O X 4. Neuros intact. VSS on RA. Tele Afib CVR. Moderate carb/2gm NA diet, 2L fluid restrictions, thin liquids. Takes pills whole. Up with SBA. WOC consulted for wound of right leg. Wound care completed per MD orders. Denies pain. Pt continues on Bumex continuous drip 0.5mg/hr. Pt scoring green on the Aggression Stop Light Tool. Discharge pending.

## 2024-07-27 ENCOUNTER — APPOINTMENT (OUTPATIENT)
Dept: OCCUPATIONAL THERAPY | Facility: CLINIC | Age: 74
DRG: 286 | End: 2024-07-27
Attending: STUDENT IN AN ORGANIZED HEALTH CARE EDUCATION/TRAINING PROGRAM
Payer: COMMERCIAL

## 2024-07-27 LAB
ANION GAP SERPL CALCULATED.3IONS-SCNC: 10 MMOL/L (ref 7–15)
ANION GAP SERPL CALCULATED.3IONS-SCNC: 13 MMOL/L (ref 7–15)
BASOPHILS # BLD AUTO: 0 10E3/UL (ref 0–0.2)
BASOPHILS NFR BLD AUTO: 0 %
BUN SERPL-MCNC: 46 MG/DL (ref 8–23)
BUN SERPL-MCNC: 48.6 MG/DL (ref 8–23)
CALCIUM SERPL-MCNC: 8.8 MG/DL (ref 8.8–10.4)
CALCIUM SERPL-MCNC: 9 MG/DL (ref 8.8–10.4)
CHLORIDE SERPL-SCNC: 92 MMOL/L (ref 98–107)
CHLORIDE SERPL-SCNC: 94 MMOL/L (ref 98–107)
CREAT SERPL-MCNC: 1.85 MG/DL (ref 0.67–1.17)
CREAT SERPL-MCNC: 2.09 MG/DL (ref 0.67–1.17)
EGFRCR SERPLBLD CKD-EPI 2021: 33 ML/MIN/1.73M2
EGFRCR SERPLBLD CKD-EPI 2021: 38 ML/MIN/1.73M2
EOSINOPHIL # BLD AUTO: 0.1 10E3/UL (ref 0–0.7)
EOSINOPHIL NFR BLD AUTO: 1 %
ERYTHROCYTE [DISTWIDTH] IN BLOOD BY AUTOMATED COUNT: 16.6 % (ref 10–15)
GLUCOSE SERPL-MCNC: 120 MG/DL (ref 70–99)
GLUCOSE SERPL-MCNC: 124 MG/DL (ref 70–99)
HCO3 SERPL-SCNC: 30 MMOL/L (ref 22–29)
HCO3 SERPL-SCNC: 32 MMOL/L (ref 22–29)
HCT VFR BLD AUTO: 41.6 % (ref 40–53)
HGB BLD-MCNC: 13.7 G/DL (ref 13.3–17.7)
IMM GRANULOCYTES # BLD: 0.1 10E3/UL
IMM GRANULOCYTES NFR BLD: 1 %
LYMPHOCYTES # BLD AUTO: 2.4 10E3/UL (ref 0.8–5.3)
LYMPHOCYTES NFR BLD AUTO: 24 %
MAGNESIUM SERPL-MCNC: 1.9 MG/DL (ref 1.7–2.3)
MCH RBC QN AUTO: 32.2 PG (ref 26.5–33)
MCHC RBC AUTO-ENTMCNC: 32.9 G/DL (ref 31.5–36.5)
MCV RBC AUTO: 98 FL (ref 78–100)
MONOCYTES # BLD AUTO: 0.9 10E3/UL (ref 0–1.3)
MONOCYTES NFR BLD AUTO: 9 %
NEUTROPHILS # BLD AUTO: 6.4 10E3/UL (ref 1.6–8.3)
NEUTROPHILS NFR BLD AUTO: 65 %
NRBC # BLD AUTO: 0 10E3/UL
NRBC BLD AUTO-RTO: 0 /100
PHOSPHATE SERPL-MCNC: 4 MG/DL (ref 2.5–4.5)
PLATELET # BLD AUTO: 231 10E3/UL (ref 150–450)
POTASSIUM SERPL-SCNC: 3.3 MMOL/L (ref 3.4–5.3)
POTASSIUM SERPL-SCNC: 3.7 MMOL/L (ref 3.4–5.3)
POTASSIUM SERPL-SCNC: 3.8 MMOL/L (ref 3.4–5.3)
RBC # BLD AUTO: 4.25 10E6/UL (ref 4.4–5.9)
SODIUM SERPL-SCNC: 135 MMOL/L (ref 135–145)
SODIUM SERPL-SCNC: 136 MMOL/L (ref 135–145)
WBC # BLD AUTO: 9.8 10E3/UL (ref 4–11)

## 2024-07-27 PROCEDURE — 250N000013 HC RX MED GY IP 250 OP 250 PS 637: Performed by: HOSPITALIST

## 2024-07-27 PROCEDURE — 36415 COLL VENOUS BLD VENIPUNCTURE: CPT | Performed by: INTERNAL MEDICINE

## 2024-07-27 PROCEDURE — 84100 ASSAY OF PHOSPHORUS: CPT | Performed by: STUDENT IN AN ORGANIZED HEALTH CARE EDUCATION/TRAINING PROGRAM

## 2024-07-27 PROCEDURE — 210N000002 HC R&B HEART CARE

## 2024-07-27 PROCEDURE — 80048 BASIC METABOLIC PNL TOTAL CA: CPT | Performed by: INTERNAL MEDICINE

## 2024-07-27 PROCEDURE — 250N000013 HC RX MED GY IP 250 OP 250 PS 637: Performed by: NURSE PRACTITIONER

## 2024-07-27 PROCEDURE — 97535 SELF CARE MNGMENT TRAINING: CPT | Mod: GO | Performed by: OCCUPATIONAL THERAPIST

## 2024-07-27 PROCEDURE — 250N000012 HC RX MED GY IP 250 OP 636 PS 637: Performed by: NURSE PRACTITIONER

## 2024-07-27 PROCEDURE — 97110 THERAPEUTIC EXERCISES: CPT | Mod: GO | Performed by: OCCUPATIONAL THERAPIST

## 2024-07-27 PROCEDURE — 250N000013 HC RX MED GY IP 250 OP 250 PS 637: Performed by: STUDENT IN AN ORGANIZED HEALTH CARE EDUCATION/TRAINING PROGRAM

## 2024-07-27 PROCEDURE — 83735 ASSAY OF MAGNESIUM: CPT | Performed by: STUDENT IN AN ORGANIZED HEALTH CARE EDUCATION/TRAINING PROGRAM

## 2024-07-27 PROCEDURE — 85025 COMPLETE CBC W/AUTO DIFF WBC: CPT | Performed by: INTERNAL MEDICINE

## 2024-07-27 PROCEDURE — 250N000011 HC RX IP 250 OP 636: Performed by: INTERNAL MEDICINE

## 2024-07-27 PROCEDURE — 84132 ASSAY OF SERUM POTASSIUM: CPT | Performed by: HOSPITALIST

## 2024-07-27 PROCEDURE — 250N000009 HC RX 250: Performed by: NURSE PRACTITIONER

## 2024-07-27 PROCEDURE — 99233 SBSQ HOSP IP/OBS HIGH 50: CPT | Performed by: HOSPITALIST

## 2024-07-27 PROCEDURE — 36415 COLL VENOUS BLD VENIPUNCTURE: CPT | Performed by: HOSPITALIST

## 2024-07-27 RX ORDER — POTASSIUM CHLORIDE 20MEQ/15ML
20 LIQUID (ML) ORAL ONCE
Status: COMPLETED | OUTPATIENT
Start: 2024-07-27 | End: 2024-07-27

## 2024-07-27 RX ORDER — MAGNESIUM OXIDE 400 MG/1
400 TABLET ORAL EVERY 4 HOURS
Status: COMPLETED | OUTPATIENT
Start: 2024-07-27 | End: 2024-07-27

## 2024-07-27 RX ORDER — POTASSIUM CHLORIDE 1500 MG/1
40 TABLET, EXTENDED RELEASE ORAL ONCE
Status: COMPLETED | OUTPATIENT
Start: 2024-07-27 | End: 2024-07-27

## 2024-07-27 RX ORDER — POTASSIUM CHLORIDE 1500 MG/1
20 TABLET, EXTENDED RELEASE ORAL ONCE
Status: COMPLETED | OUTPATIENT
Start: 2024-07-27 | End: 2024-07-27

## 2024-07-27 RX ORDER — CHLOROTHIAZIDE SODIUM 500 MG/1
250 INJECTION INTRAVENOUS ONCE
Status: COMPLETED | OUTPATIENT
Start: 2024-07-27 | End: 2024-07-27

## 2024-07-27 RX ADMIN — CETIRIZINE HYDROCHLORIDE 10 MG: 10 TABLET, FILM COATED ORAL at 20:44

## 2024-07-27 RX ADMIN — POTASSIUM CHLORIDE 60 MEQ: 1500 TABLET, EXTENDED RELEASE ORAL at 20:44

## 2024-07-27 RX ADMIN — POTASSIUM CHLORIDE 20 MEQ: 20 SOLUTION ORAL at 17:51

## 2024-07-27 RX ADMIN — PREDNISONE 10 MG: 10 TABLET ORAL at 08:35

## 2024-07-27 RX ADMIN — POTASSIUM CHLORIDE 20 MEQ: 1500 TABLET, EXTENDED RELEASE ORAL at 01:10

## 2024-07-27 RX ADMIN — Medication 2 CAPSULE: at 08:29

## 2024-07-27 RX ADMIN — BUMETANIDE 0.5 MG/HR: 0.25 INJECTION INTRAMUSCULAR; INTRAVENOUS at 05:37

## 2024-07-27 RX ADMIN — POLYETHYLENE GLYCOL 3350 17 G: 17 POWDER, FOR SOLUTION ORAL at 08:29

## 2024-07-27 RX ADMIN — LOSARTAN POTASSIUM 50 MG: 50 TABLET, FILM COATED ORAL at 08:29

## 2024-07-27 RX ADMIN — RIVAROXABAN 20 MG: 20 TABLET, FILM COATED ORAL at 17:51

## 2024-07-27 RX ADMIN — Medication 12.5 MG: at 08:28

## 2024-07-27 RX ADMIN — SULFAMETHOXAZOLE AND TRIMETHOPRIM 1 TABLET: 800; 160 TABLET ORAL at 08:29

## 2024-07-27 RX ADMIN — CHLOROTHIAZIDE SODIUM 250 MG: 500 INJECTION, POWDER, LYOPHILIZED, FOR SOLUTION INTRAVENOUS at 11:09

## 2024-07-27 RX ADMIN — POTASSIUM CHLORIDE 40 MEQ: 1500 TABLET, EXTENDED RELEASE ORAL at 08:28

## 2024-07-27 RX ADMIN — ALLOPURINOL 300 MG: 300 TABLET ORAL at 08:29

## 2024-07-27 RX ADMIN — FINASTERIDE 1 MG: 1 TABLET, FILM COATED ORAL at 08:29

## 2024-07-27 RX ADMIN — Medication 400 MG: at 12:19

## 2024-07-27 RX ADMIN — POTASSIUM CHLORIDE 60 MEQ: 1500 TABLET, EXTENDED RELEASE ORAL at 10:50

## 2024-07-27 RX ADMIN — Medication 400 MG: at 08:29

## 2024-07-27 ASSESSMENT — ACTIVITIES OF DAILY LIVING (ADL)
ADLS_ACUITY_SCORE: 23
ADLS_ACUITY_SCORE: 23
ADLS_ACUITY_SCORE: 22
ADLS_ACUITY_SCORE: 23
ADLS_ACUITY_SCORE: 22
ADLS_ACUITY_SCORE: 23
ADLS_ACUITY_SCORE: 22
ADLS_ACUITY_SCORE: 23
ADLS_ACUITY_SCORE: 22
ADLS_ACUITY_SCORE: 22
ADLS_ACUITY_SCORE: 23
ADLS_ACUITY_SCORE: 23
ADLS_ACUITY_SCORE: 22
ADLS_ACUITY_SCORE: 23
ADLS_ACUITY_SCORE: 22
ADLS_ACUITY_SCORE: 22
ADLS_ACUITY_SCORE: 23
ADLS_ACUITY_SCORE: 22
ADLS_ACUITY_SCORE: 22
ADLS_ACUITY_SCORE: 23
ADLS_ACUITY_SCORE: 22

## 2024-07-27 NOTE — PROGRESS NOTES
Cross cover    Called that pt is on bumex drip and creatinine has increased to 2.09 from 1.67. has been around 1.7 this admission and was 2 earlier this month. Just admitted on 7/25 and need significant diuresis, around 30 lbs. Bicarb, sodium and K stable.     -continue bumex drip for now  -repeat BMP with morning labs  -if creatinine continues rising may need to consider pause on drip

## 2024-07-27 NOTE — PROGRESS NOTES
[facility] Cardiology Progress Note    Date of Admission:  7/25/2024  Reason for Consult:   CHF     Subjective   Mr. Schultz feels overall the same, continues to be fluid overload. No acute events overnight    Objective   INTAKE / OUTPUT     Intake/Output Summary (Last 24 hours) at 7/26/2024 2059  Last data filed at 7/26/2024 1530  Gross per 24 hour   Intake 1125 ml   Output 4650 ml   Net -3525 ml       VITAL SIGNS  Temp:  [97.5  F (36.4  C)-98.1  F (36.7  C)] 97.9  F (36.6  C)  Pulse:  [] 101  Resp:  [16-18] 16  BP: ()/(69-99) 121/81  SpO2:  [91 %-99 %] 91 %  307 lbs 4.8 oz    PHYSICAL EXAM   Constitutional: Appears stated age, well nourished, NAD. On NC  Neck: Supple.  JVD at earlobe.   Respiratory: Non-labored. Lungs CTAB.  Cardiovascular: Irregular rate/rhythm. Bilateral lower extremities with +3 edema.   GI: Soft, non-distended, non-tender.  Skin: Warm and dry.   Musculoskeletal/Extremities: Symmetrical movement.  Neurologic: No gross focal deficits. Alert, awake.  Psychiatric: Affect appropriate. Mentation normal.      Data   Most Recent 3 CBC's:  Recent Labs   Lab Test 07/26/24  0707 05/16/24  1052 05/15/24  0946   WBC 10.5 16.4* 14.9*   HGB 13.5 15.0 15.2   MCV 99 98 98    372 420     Most Recent 3  BMP's:  Recent Labs   Lab Test 07/26/24  1831 07/26/24  1408 07/26/24  1148 07/26/24  0707   NA  --  134* 133* 135   POTASSIUM 3.4 4.2 3.3* 2.8*   CHLORIDE  --  91* 90* 92*   CO2  --  30* 31* 32*   BUN  --  42.0* 40.2* 39.3*   CR  --  1.67* 1.69* 1.76*   ANIONGAP  --  13 12 11   ASHELY  --  9.0 9.3 8.9   GLC  --  185* 141* 119*     Most Recent 3 BNP's:  Recent Labs   Lab Test 05/05/24  1003 02/11/24  0220 11/28/23  0623 11/25/23  1934 11/24/23  1215 11/29/16  1840   NTBNPI 1,202* 442 160   < >  --   --    NTBNP  --   --   --   --  274 720*    < > = values in this interval not displayed.      Most Recent Cholesterol Panel:  Recent Labs   Lab Test  07/27/23  0955   CHOL 163   LDL 97   HDL 39*   TRIG 133       Most Recent Transthoracic Echocardiogram:  Echo result w/o MOPS: Interpretation Summary Technically challenging study due to patient body habitus. The left ventricle is borderline dilated. There is mild concentric leftventricular hypertrophy.Left ventricular systolic function is low normal. The visual ejection fractionis estimated at 55%. Regional wall motion challenging to assess due tofrequent ectopy/irregular R-R intervals however no obvious regional wallmotion abnormalities seen.The right ventricle is borderline dilated. The right ventricular systolicfunction is normal.Moderate aortic stenosis, Vmax 3.7 m/s, mean gradient 35 mmHg, MARILYN 1.1cm2, DI0.26. SVi 44 cc/m2. There is mild (1+) aortic regurgitation. Aortic valvemorphology is not well visualized, however it is heavily calcified.There is no pericardial effusion.The inferior vena cava was normal in size with preserved respiratoryvariability. This study was compared to a TTE from 2/11/2024. Global LV systolic functionis marginally less dynamic on this present study.        Most Recent Cardiac Catheterization:  No results found.    Assessment & Plan     Assessment     Acute on chronic diastolic heart failure, likely triggered by atrial fibrillation   -LVEF: 55% noted on echo 5/2024  -Fluid Status: hypervolemic, possibly 30-40# above EDW, patient states he feels great around 235# but thinks his EDW is 250#, discharge weight from previous admission in 5/2024 was 278#  -Diuretic Regimen: torsemide 80 mg daily   -Ischemic Eval: lexiscan stress test 5/2024 noted below   -GDMT PTA:  Beta blocker: metoprolol tartrate 12.5 mg BID   ACEI/ARB/ARNI: losartan 50 mg daily   Aldactone antagonist: spironolactone 25 mg daily   SGLT2 inhibitor: none     Persistent atrial fibrillation with multiple attempts at rhythm control, cardioversions, and ablation attempts  [id number] Score 4 (age, CHF, HTN, aortic  plaque)  Anticoagulated with Xarelto   Per EP noted on 7/25/2024, EP to consider dofetilide/DCCV once fluid status is under better control. Will consider during current admission      Hypokalemia      Moderate aortic stenosis      History of abnormal nuclear stress test during admission 5/2024  Lexiscan stress test 5/2024- abnormal, small area of ischemia in the apical segments of the left ventricle, small area of nontransmural infarction in the basal inferolateral segments of the left ventricle associated with a mild degree of iva-infarct ischemia involving basal to mid inferior wall   This was thought to possibly be a false positive finding due to body habitus, but recommendation was a left and right heart cath when patients pulmonary status returned to baseline     HTN   BOWEN, compliant with CPAP   Myocardial scar noted on cardiac MRI   History of alcohol dependence     Mr. Mike Schultz is a very pleasant 73 year old male with a past medical history of  persistent atrial fibrillation, HFpEF, moderate aortic stenosis, hypertension, BOWEN, alcohol use disorder, Bell's palsy, lung nodules who was admitted from the EP clinic after no response after infusion clinic on 7/24/2024. He remains 30# above discharge weight from 5/2024 (278#), continues to have dyspnea on exertion, and lower extremity edema 7/25/2024.      Patient states that for the past month his dyspnea on exertion has become worse. He used to mobilize and his oxygen saturation remained around 91-92%. Within the last month, his oxygen saturation has been within the 80s with exertion and he has noticed an increase in lower extremity edema. This has limited his level of activity. He states he cannot do much. He denies chest pain, palpitations, and orthopnea.     Interval history: the patient is diuresing very well with improvement in kidney function.     Plan     Continue bumex gtt at 0.5 mg/hr  One time dose of 250 mg of diuril today   Continue the  following cardiology medications   Losartan 50 mg daily   Metoprolol tartrate 12.5 mg BID   Xarelto 20 mg daily   Daily standing weight, strict I/O, daily BMP and repletion of electrolytes   Coordinating outpatient follow up   Cardiology to follow    Thank you for involving us in the care of this patient.     Nahed Hall MD on 7/26/2024 at 8:59 PM

## 2024-07-27 NOTE — PROVIDER NOTIFICATION
Notification     Notified Person: Clement MARTIN     Notification Time: 0055     Notification Interaction: Volcera      Purpose of Notification:   pt admitted w/ CHF exacerbation on Bumex gtt at 0.5mg/hr, on RA DELEON and 3+ BLE. Midnight BMP creatinine elevated to 2.09 from 1.67 at 2PM, no additional BMP's ordered please advise     Orders Received: AM BMP

## 2024-07-27 NOTE — PLAN OF CARE
A&O, independent in room, Tele Afib CVR. Continuous Bumex infusion, X1 dose chlorothiazide given. 2000 ml fluid restriction. K and mag replaced, recheck in am. BLE edema, wraps in place, dressing to shin CDI.   Problem: Heart Failure  Goal: Optimal Coping  Outcome: Progressing  Intervention: Support Psychosocial Response  Recent Flowsheet Documentation  Taken 7/27/2024 0815 by Leslie Zamora RN  Supportive Measures:   active listening utilized   self-care encouraged  Goal: Optimal Cardiac Output  Outcome: Not Progressing  Goal: Stable Heart Rate and Rhythm  Outcome: Not Progressing  Goal: Optimal Functional Ability  Outcome: Progressing  Intervention: Optimize Functional Ability  Recent Flowsheet Documentation  Taken 7/27/2024 1600 by Leslie Zamora RN  Activity Management: ambulated in room  Taken 7/27/2024 1200 by Leslie Zamora RN  Activity Management: ambulated in room  Taken 7/27/2024 0815 by Leslie Zamora RN  Activity Management: up ad juan ramon  Goal: Fluid and Electrolyte Balance  Outcome: Progressing  Intervention: Monitor and Manage Fluid and Electrolyte Balance  Recent Flowsheet Documentation  Taken 7/27/2024 0815 by Leslie Zamora RN  Fluid/Electrolyte Management: electrolyte supplement adjusted  Goal: Improved Oral Intake  Outcome: Progressing  Goal: Effective Oxygenation and Ventilation  Outcome: Progressing  Intervention: Promote Airway Secretion Clearance  Recent Flowsheet Documentation  Taken 7/27/2024 1600 by Leslie Zamora RN  Activity Management: ambulated in room  Taken 7/27/2024 1200 by Leslie Zamora RN  Activity Management: ambulated in room  Taken 7/27/2024 0815 by Leslie Zamora RN  Cough And Deep Breathing: done independently per patient  Activity Management: up ad juan ramon  Goal: Effective Breathing Pattern During Sleep  Intervention: Monitor and Manage Obstructive Sleep Apnea  Recent Flowsheet Documentation  Taken 7/27/2024 0815 by Leslie Zamora RN  Medication  Review/Management:   medications reviewed   high-risk medications identified

## 2024-07-27 NOTE — PLAN OF CARE
Heart Failure Care Map  GOALS TO BE MET BEFORE DISCHARGE:    1. Decrease congestion and/or edema with diuretic therapy to achieve near optimal volume status.     Dyspnea improved: No, further care required to meet this goal. Please explain dyspnea with activity    Edema improved: No, further care required to meet this goal. Please explain BLE edema still present         Last 24 hour I/O:   Intake/Output Summary (Last 24 hours) at 7/27/2024 0542  Last data filed at 7/27/2024 0453  Gross per 24 hour   Intake --   Output 4650 ml   Net -4650 ml           Net I/O and Weights since admission:   06/27 0700 - 07/27 0659  In: 2265 [P.O.:2265]  Out: 6500 [Urine:6500]  Net: -4235     Vitals:    07/25/24 1714 07/26/24 0555 07/27/24 0453   Weight: 140.8 kg (310 lb 6.4 oz) 139.4 kg (307 lb 4.8 oz) 138.8 kg (306 lb)       2.  O2 sats > 90% on room air, or at prior home O2 therapy level.      Able to wean O2 this shift to keep sats above 90%?: Yes, satisfactory for discharge.   Does patient use Home O2? No          Current oxygenation status:   SpO2: 93 %     O2 Device: None (Room air),      3.  Tolerates ambulation and mobility near baseline.     Ambulation: No, further care required to meet this goal. Please explain pt able to ambulate short distances in room.    Times patient ambulated with staff this shift: 0    Please review the Heart Failure Care Map for additional HF goal outcomes.    Malika Lewis RN  7/27/2024   Goal Outcome Evaluation:  [id number]  Neuro- x4  Most Recent Vitals- /80 (BP Location: Left arm)   Pulse 100   Temp 98.5  F (36.9  C) (Oral)   Resp 17   Wt 138.8 kg (306 lb)   SpO2 93%   BMI 46.53 kg/m    Tele/Cardiac- Afib CVR   Resp- RA, DELEON   Activity- sba/independent   Pain- denies   Drips-Bumex gtt infusing at 0.5 mg/hr  Skin- R leg wound - dressing in place, WOC following   GI/- WDL   Labs K replaced x1 and creatinine elevated 2.09 - recheck in AM. Morning Labs pending  Diet: Fluid  restriction 2000 ML FLUID  Combination Diet Regular Diet Adult; Moderate Consistent Carb (60 g CHO per Meal) Diet; 2 gm NA Diet; Low Saturated Fat Diet    Plan- Monitor AM labs, continue iv diuresis. R/L heart cath once able

## 2024-07-27 NOTE — PROGRESS NOTES
[facility]    Internal Medicine Hospitalist Progress Note  07/27/2024  I evaluated patient on the above date.    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    Acute on chronic diastolic CHF exacerbation (HFpEF).  Hypertension (benign essential).  AS, moderate.  [PTA: losartan 50 mg daily; metoprolol 12.5 mg BID; spironolactone 25 mg daily; torsemide 80 mg daily.]  * Echo 5/2024 showed LVEF 55%, no obvious regional wall motion abnormalities; RV borderline dilated with normal systolic function; moderate AS, mild AR.  * On admit, noted primary symptoms of significant weight gain, up to 310 pounds, diffuse edema, dyspnea. Felt to be multifactorial etiology including ongoing corticosteroid use as well as PAF.   * Wt 310 lbs on admit (notes baseline wt around 270 lbs). CXR showed mild bibasilar atelectasis/scar, no pleural effusion, normal pulmonary vascularity. Given bumetanide and chlorothiazide IV dose and started on bumetanide gtt on admit. Cardiology consulted on admit.   Vitals:    07/25/24 1714 07/26/24 0555 07/27/24 0453   Weight: 140.8 kg (310 lb 6.4 oz) 139.4 kg (307 lb 4.8 oz) 138.8 kg (306 lb)     I/O last 3 completed shifts:  In: -   Out: 3450 [Urine:3450]  Recent Labs   Lab 07/27/24  0627 07/26/24  2345 07/26/24  1831 07/26/24  1408 07/26/24  1148 07/26/24  0707 07/25/24  1048 07/24/24  1507 07/24/24  1141    135  --  134* 133* 135 137 136 136   CO2 32* 30*  --  30* 31* 32* 32*  --   --    BUN 46.0*  48.6*  --  42.0* 40.2* 39.3* 31.1* 33.4* 33.7*   CR 1.85* 2.09*  --  1.67* 1.69* 1.76* 1.56* 1.56* 1.53*   POTASSIUM 3.3* 3.7 3.4 4.2 3.3* 2.8* 3.5 3.8 3.5   - Continue bumetanide gtt; goal is to diurese at least ~30 lbs.  - Continue losartan and metoprolol.  - Hold PTA spironolactone and torsemide for now.  - Continue 2 g sodium diet and 2 L fluid restriction.  - Continue to monitor i/o's, daily wts.  - Appreciate Cardiology help.  - AF management as noted.    Persistent A-fib, felt to be contributing to acute CHF.  * Follows with Dr. Day. H/o multiple antiarrhythmics including flecainide which he failed and amiodarone resulted in pneumonitis. H/o multiple ablations and cardioversion which have subsequently failed.   * Cardiology consulted on admit.  - Continue to monitor on telemetry.  - Continue PTA metoprolol and rivaroxaban.  - Per EP noted on 7/25/2024, EP to consider dofetilide/DCCV once fluid status is under better control. Will consider during current admission     Recent pneumonitis, suspect from relapse of inflammatory lung process (h/o pulmonary toxicity from amiodarone versus other inflammatory process), on steroids.  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 Follow-up CT 3/2024 showed bilateral interstitial infiltrates with likely component of fibrosis. Recent hospitalization 5/2024 with issues including relapsed inflammatory lung process, started on high dose steroids with slow taper by Pulmonology.  * Initial presentation as above. CXR showed mild bibasilar atelectasis/scar, no pleural effusion, normal pulmonary vascularity. O2 sats 90's on RA on admit.  - Continue prednisone 10 mg daily through 8/8/2024; then 5 mg daily starting 8/9/2024 for 60 days.  - Continue sulfamethoxazole-trimethoprim prophylaxis - monitor renal function closely, may need to hold if cr worsens.  - Follow-up with Pulmonology (pending PFTs and repeat CT for 8/2024).    Exertional dyspnea,  suspect multifactorial including CHF, recent pneumonitis.  - Continue to treat other issues as noted.    BOWEN with variable compliance secondary to frequent nocturia.  - Continue CPAP with 3L O2 while sleeping.    ROBIN on CKD likely stage III.  * Baseline creatinine 1.5.  * Cr 1.56 on admit. Started on diuresis as above.  Went up to 2.09 but improved again at 1.85 on 7/27.  Recent Labs   Lab 07/27/24  0627 07/26/24  2345 07/26/24  1408 07/26/24  1148 07/26/24  0707 07/25/24  1048   CR 1.85* 2.09* 1.67* 1.69* 1.76* 1.56*   Estimated Creatinine Clearance: 49.3 mL/min (A) (based on SCr of 1.85 mg/dL (H)).  - Continue to monitor BMP  - Avoid nephrotoxic medications.    Hypokalemia and hypomagnesemia.  * Continued on scheduled potassium on admit.  Recent Labs   Lab 07/27/24  0627 07/26/24  2345 07/26/24  1831 07/26/24  1408 07/26/24  1148 07/26/24  0707 07/25/24  1048 07/24/24  1507 07/24/24  1141   POTASSIUM 3.3* 3.7 3.4 4.2 3.3* 2.8*   < > 3.8 3.5   MAG 1.9  --   --   --   --  1.6*  --  2.0 1.6*   PHOS 4.0  --   --   --   --  4.1  --   --   --     < > = values in this interval not displayed.   - Continue scheduled potassium chloride 60 meq BID for now; monitor closely with ROBIN/CKD.  - Continue PRN K and Mg replacement - monitor K levels closely with ROBIN/CKD.    Alcohol use disorder with intermittent spells of sobriety.  * On admit, noted that pt had a few seltzer drinks the past week PTA.  - Cessation encouraged on admit.    Bell's palsy with asymmetric smile.  - Noted.    Lung nodules  * Follows with outpatient Pulmonology.  - Follow-up outpatient.    BPH.  - Continue PTA finasteride.    RLE wound.  * Noted on admit. WOC RN consulted.  - Continue local wound cares per WOC RN.    Severe obesity.  * Body mass index is 46.53 kg/m .  - Needs to continue to pursue aggressive dietary and lifestyle modifications.      Clinically Significant Risk Factors        # Hypokalemia: Lowest K = 2.8 mmol/L in last 2 days, will replace  "as needed     # Hypomagnesemia: Lowest Mg = 1.6 mg/dL in last 2 days, will replace as needed        # Acute Kidney Injury, unspecified: based on a >150% or 0.3 mg/dL increase in last creatinine compared to past 90 day average, will monitor renal function  # Hypertension: Noted on problem list  # Acute heart failure with preserved ejection fraction: heart failure noted on problem list, last echo with EF >50%, and receiving IV diuretics           # Severe Obesity: Estimated body mass index is 46.53 kg/m  as calculated from the following:    Height as of an earlier encounter on 7/25/24: 1.727 m (5' 8\").    Weight as of this encounter: 138.8 kg (306 lb)., PRESENT ON ADMISSION       # Financial/Environmental Concerns: none         Diet: Fluid restriction 2000 ML FLUID  Combination Diet Regular Diet Adult; Moderate Consistent Carb (60 g CHO per Meal) Diet; 2 gm NA Diet; Low Saturated Fat Diet    Prophylaxis: PCD's, ambulation, and is on DOAC.  Palmer Catheter: Not present  Lines: None     Code Status: Full Code    Disposition Plan   Medically Ready for Discharge: Anticipated in 2-4 Days  Expected discharge to home pending stability/improvement/resolution of above acute issues.    Entered: Masha Moy MD 07/27/2024, 9:16 AM     Communication.      TIME SPENT: I spent approximately 50 minutes bedside and on the inpatient unit today managing the care of patient. Over 50% of my time on the unit was spent in extensive chart review, counseling the patient/family and/or coordinating care regarding services listed in this note.        Interval History   Stable overnight.  Weight came down by 1 pound from yesterday.  States he is urinating a lot.  Breathing is better.  Leg swelling is a little bit better.    * Data reviewed today: I reviewed all new labs and imaging over the last 24 hours. I personally reviewed labs    Physical Exam   Most recent vitals:   , Blood pressure 128/83, pulse 85, temperature 98.5  F (36.9  C), " temperature source Oral, resp. rate 18, weight 138.8 kg (306 lb), SpO2 93%. O2 Device: None (Room air)    Vitals:    07/25/24 1714 07/26/24 0555 07/27/24 0453   Weight: 140.8 kg (310 lb 6.4 oz) 139.4 kg (307 lb 4.8 oz) 138.8 kg (306 lb)     Vital signs with ranges:  Temp:  [97.5  F (36.4  C)-98.5  F (36.9  C)] 98.5  F (36.9  C)  Pulse:  [] 85  Resp:  [16-18] 18  BP: (102-135)/(70-83) 128/83  SpO2:  [91 %-94 %] 93 %  Patient Vitals for the past 24 hrs:   BP Temp Temp src Pulse Resp SpO2 Weight   07/27/24 0815 128/83 -- -- 85 18 93 % --   07/27/24 0453 118/80 -- -- 100 17 93 % 138.8 kg (306 lb)   07/26/24 2308 102/70 98.5  F (36.9  C) Oral 88 18 94 % --   07/26/24 2021 121/81 97.9  F (36.6  C) Oral 101 16 91 % --   07/26/24 1544 121/83 97.5  F (36.4  C) Oral 92 18 92 % --   07/26/24 1121 135/83 97.6  F (36.4  C) Oral 89 -- 91 % --     I/O's last 24 hours:  I/O last 3 completed shifts:  In: -   Out: 3450 [Urine:3450]    Constitutional: awake, alert, oriented, pleasant, conversant, sitting up in chair  Cardiovascular: Irregular, systolic murmur noted, bilateral lower extremity 2+ pitting edema noted  Lungs: diminished in the bases, no crackles or wheezes  Gastrointestinal/Abdomen: soft, non-tender, non-distended, positive bowel sounds  Skin: bilateral lower extremities with 2+ pitting edema  Neurologic: Alert and conversing appropriately, moving all extremities, fluent speech  Psychiatric: Calm and pleasant       Data    Labs reviewed.  Recent Labs   Lab 07/27/24  0627 07/26/24  2345 07/26/24  1831 07/26/24  1408 07/26/24  1148 07/26/24  0707   WBC 9.8  --   --   --   --  10.5   HGB 13.7  --   --   --   --  13.5   MCV 98  --   --   --   --  99     --   --   --   --  215    135  --  134*   < > 135   POTASSIUM 3.3* 3.7 3.4 4.2   < > 2.8*   CHLORIDE 94* 92*  --  91*   < > 92*   CO2 32* 30*  --  30*   < > 32*   BUN 46.0* 48.6*  --  42.0*   < > 39.3*   CR 1.85* 2.09*  --  1.67*   < > 1.76*   ANIONGAP 10 13  " --  13   < > 11   ASHELY 8.8 9.0  --  9.0   < > 8.9   * 124*  --  185*   < > 119*    < > = values in this interval not displayed.     Recent Labs   Lab Test 05/05/24  1515 05/05/24  1003 02/11/24  0848 02/11/24  0220 11/28/23  0623   NT-PROBNP, INPATIENT  --  1,202*  --  442 160   TROPONIN T HIGH SENSITIVITY 85* 90* 59* 57*  --      Recent Labs   Lab 07/27/24  0627 07/26/24  2345 07/26/24  1408 07/26/24  1148 07/26/24  0707 07/25/24  1048   * 124* 185* 141* 119* 136*     Recent Labs   Lab Test 02/25/19  0756 10/12/17  0814   A1C 5.7* 5.7       No results for input(s): \"INR\", \"[id number]\" in the last 168 hours.  Recent Labs   Lab 07/27/24  0627 07/26/24  0707   WBC 9.8 10.5       MICRO:  CULTURES (INCLUDING BLOOD AND URINE):  No lab results found in last 7 days.    No results found for this or any previous visit (from the past 24 hour(s)).      Medications   All medications were reviewed. MAR.    Infusions:  Current Facility-Administered Medications   Medication Dose Route Frequency Provider Last Rate Last Admin    - MEDICATION INSTRUCTIONS -   Does not apply DOES NOT GO TO Courtney Blackmon APRN CNP        bumetanide (BUMEX) 0.25 mg/mL infusion  0.5 mg/hr Intravenous Continuous Courtney Roy APRN CNP 2 mL/hr at 07/27/24 0800 0.5 mg/hr at 07/27/24 0800    Continuing ACE inhibitor/ARB/ARNI from home medication list OR ACE inhibitor/ARB/ARNI order already placed during this visit   Does not apply DOES NOT GO TO Courtney Blackmon APRN CNP        Continuing beta blocker from home medication list OR beta blocker order already placed during this visit   Does not apply DOES NOT GO TO Courtney Blackmon APRN CNP        Patient is already receiving anticoagulation with heparin, enoxaparin (LOVENOX), warfarin (COUMADIN)  or other anticoagulant medication   Does not apply Continuous PRN Courtney Roy APRN CNP         Scheduled Medications:  Current Facility-Administered Medications   Medication Dose " Route Frequency Provider Last Rate Last Admin    allopurinol (ZYLOPRIM) tablet 300 mg  300 mg Oral Daily Courtney Roy APRN CNP   300 mg at 07/27/24 0829    cetirizine (zyrTEC) tablet 10 mg  10 mg Oral QPM Courtney Roy APRN CNP   10 mg at 07/26/24 2027    chlorothiazide (DIURIL) injection 250 mg  250 mg Intravenous Once Nahed Hall MD        finasteride (PROPECIA) tablet 1 mg  1 mg Oral Daily Courtney Roy APRN CNP   1 mg at 07/27/24 0829    losartan (COZAAR) tablet 50 mg  50 mg Oral Daily Courtney Roy APRN CNP   50 mg at 07/27/24 0829    magnesium oxide (MAG-OX) tablet 400 mg  400 mg Oral Q4H Masha Moy MD   400 mg at 07/27/24 0829    metoprolol tartrate (LOPRESSOR) half-tab 12.5 mg  12.5 mg Oral BID Courtney Roy APRN CNP   12.5 mg at 07/27/24 0828    polyethylene glycol (MIRALAX) Packet 17 g  17 g Oral Daily Courtney Roy APRN CNP   17 g at 07/27/24 0829    potassium chloride pushpa ER (KLOR-CON M20) CR tablet 60 mEq  60 mEq Oral BID Courtney Roy APRN CNP   60 mEq at 07/26/24 2148    predniSONE (DELTASONE) tablet 10 mg  10 mg Oral Daily Courtney Roy APRN CNP   10 mg at 07/27/24 0835    Followed by    [START ON 8/9/2024] predniSONE (DELTASONE) tablet 5 mg  5 mg Oral Daily Courtney Roy APRN CNP        psyllium (METAMUCIL/KONSYL) capsule 1-2 capsule  1-2 capsule Oral Daily Courtney Roy APRN CNP   2 capsule at 07/27/24 0829    rivaroxaban ANTICOAGULANT (XARELTO) tablet 20 mg  20 mg Oral Daily with supper Courtney Roy APRN CNP   20 mg at 07/26/24 1750    sodium chloride (PF) 0.9% PF flush 3 mL  3 mL Intracatheter Q8H Courtney Roy APRN CNP   3 mL at 07/26/24 1036    [Held by provider] spironolactone (ALDACTONE) tablet 25 mg  25 mg Oral Daily Courtney Roy APRN CNP        sulfamethoxazole-trimethoprim (BACTRIM DS) 800-160 MG per tablet 1 tablet  1 tablet Oral Daily Courtney Roy APRN CNP   1 tablet at 07/27/24 0829    [Held by provider] torsemide  (DEMADEX) tablet 80 mg  80 mg Oral Daily Courtney Roy APRN CNP         PRN Medications:  Current Facility-Administered Medications   Medication Dose Route Frequency Provider Last Rate Last Admin    - MEDICATION INSTRUCTIONS -   Does not apply DOES NOT GO TO Courtney Blackmon APRN CNP        acetaminophen (TYLENOL) tablet 650 mg  650 mg Oral Q4H PRN Courtney Roy APRN CNP        Or    acetaminophen (TYLENOL) Suppository 650 mg  650 mg Rectal Q4H PRN Courtney Roy APRN CNP        calcium carbonate (TUMS) chewable tablet 1,000 mg  1,000 mg Oral 4x Daily PRN Courtney Roy APRN CNP        Continuing ACE inhibitor/ARB/ARNI from home medication list OR ACE inhibitor/ARB/ARNI order already placed during this visit   Does not apply DOES NOT GO TO Courtney Blackmon APRN CNP        Continuing beta blocker from home medication list OR beta blocker order already placed during this visit   Does not apply DOES NOT GO TO Courtney Blackmon APRN CNP        lidocaine (LMX4) cream   Topical Q1H PRN Courtney Roy APRN CNP        lidocaine 1 % 0.1-1 mL  0.1-1 mL Other Q1H PRN Courtney Roy APRN CNP        melatonin tablet 1 mg  1 mg Oral At Bedtime PRN Courtney Roy APRN CNP        ondansetron (ZOFRAN ODT) ODT tab 4 mg  4 mg Oral Q6H PRN Courtney Roy APRN CNP        Or    ondansetron (ZOFRAN) injection 4 mg  4 mg Intravenous Q6H PRN Courtney Roy APRN CNP        Patient is already receiving anticoagulation with heparin, enoxaparin (LOVENOX), warfarin (COUMADIN)  or other anticoagulant medication   Does not apply Continuous PRN Courtney Roy APRN CNP        prochlorperazine (COMPAZINE) injection 5 mg  5 mg Intravenous Q6H PRN Courtney Roy APRN CNP        Or    prochlorperazine (COMPAZINE) tablet 5 mg  5 mg Oral Q6H PRN Courtney Roy APRN CNP        Or    prochlorperazine (COMPAZINE) suppository 12.5 mg  12.5 mg Rectal Q12H PRN Courtney Roy APRN CNP        senna-docusate  (SENOKOT-S/PERICOLACE) 8.6-50 MG per tablet 1 tablet  1 tablet Oral BID PRN Courtney Roy APRN CNP        Or    senna-docusate (SENOKOT-S/PERICOLACE) 8.6-50 MG per tablet 2 tablet  2 tablet Oral BID PRN Courtney Roy APRN CNP        sodium chloride (PF) 0.9% PF flush 3 mL  3 mL Intracatheter q1 min prn Courtney Roy APRN CNP

## 2024-07-27 NOTE — PROGRESS NOTES
Orientation: A&O x4    Vitals/Tele: VSS on RA, Tele A-fib w/CVR and PVCs    IV Access/drains: Bumex infusing at 0.5 mg/hr    Diet: mod carb, 2 gm Na, 2L fluid restriction    Mobility: independent    GI/: continent    Wound/Skin: wound on RLE    Consults: WOC, cardiology    Discharge plan: pending, needs further diuresis      See Flow sheets for assessment

## 2024-07-28 ENCOUNTER — APPOINTMENT (OUTPATIENT)
Dept: OCCUPATIONAL THERAPY | Facility: CLINIC | Age: 74
DRG: 286 | End: 2024-07-28
Attending: STUDENT IN AN ORGANIZED HEALTH CARE EDUCATION/TRAINING PROGRAM
Payer: COMMERCIAL

## 2024-07-28 LAB
ANION GAP SERPL CALCULATED.3IONS-SCNC: 10 MMOL/L (ref 7–15)
BUN SERPL-MCNC: 51.1 MG/DL (ref 8–23)
CALCIUM SERPL-MCNC: 9 MG/DL (ref 8.8–10.4)
CHLORIDE SERPL-SCNC: 93 MMOL/L (ref 98–107)
CREAT SERPL-MCNC: 2.03 MG/DL (ref 0.67–1.17)
EGFRCR SERPLBLD CKD-EPI 2021: 34 ML/MIN/1.73M2
GLUCOSE SERPL-MCNC: 123 MG/DL (ref 70–99)
HCO3 SERPL-SCNC: 33 MMOL/L (ref 22–29)
MAGNESIUM SERPL-MCNC: 1.9 MG/DL (ref 1.7–2.3)
PHOSPHATE SERPL-MCNC: 4.5 MG/DL (ref 2.5–4.5)
POTASSIUM SERPL-SCNC: 3.3 MMOL/L (ref 3.4–5.3)
POTASSIUM SERPL-SCNC: 3.8 MMOL/L (ref 3.4–5.3)
SODIUM SERPL-SCNC: 136 MMOL/L (ref 135–145)

## 2024-07-28 PROCEDURE — 250N000009 HC RX 250: Performed by: NURSE PRACTITIONER

## 2024-07-28 PROCEDURE — 250N000013 HC RX MED GY IP 250 OP 250 PS 637: Performed by: NURSE PRACTITIONER

## 2024-07-28 PROCEDURE — 80048 BASIC METABOLIC PNL TOTAL CA: CPT | Performed by: HOSPITALIST

## 2024-07-28 PROCEDURE — 210N000002 HC R&B HEART CARE

## 2024-07-28 PROCEDURE — 97110 THERAPEUTIC EXERCISES: CPT | Mod: GO | Performed by: OCCUPATIONAL THERAPIST

## 2024-07-28 PROCEDURE — 36415 COLL VENOUS BLD VENIPUNCTURE: CPT | Performed by: INTERNAL MEDICINE

## 2024-07-28 PROCEDURE — 84100 ASSAY OF PHOSPHORUS: CPT | Performed by: HOSPITALIST

## 2024-07-28 PROCEDURE — 84132 ASSAY OF SERUM POTASSIUM: CPT | Performed by: INTERNAL MEDICINE

## 2024-07-28 PROCEDURE — 99232 SBSQ HOSP IP/OBS MODERATE 35: CPT | Performed by: HOSPITALIST

## 2024-07-28 PROCEDURE — 36415 COLL VENOUS BLD VENIPUNCTURE: CPT | Performed by: HOSPITALIST

## 2024-07-28 PROCEDURE — 250N000012 HC RX MED GY IP 250 OP 636 PS 637: Performed by: NURSE PRACTITIONER

## 2024-07-28 PROCEDURE — 97535 SELF CARE MNGMENT TRAINING: CPT | Mod: GO | Performed by: OCCUPATIONAL THERAPIST

## 2024-07-28 PROCEDURE — 250N000013 HC RX MED GY IP 250 OP 250 PS 637: Performed by: INTERNAL MEDICINE

## 2024-07-28 PROCEDURE — 83735 ASSAY OF MAGNESIUM: CPT | Performed by: HOSPITALIST

## 2024-07-28 RX ORDER — POTASSIUM CHLORIDE 1500 MG/1
20 TABLET, EXTENDED RELEASE ORAL ONCE
Status: COMPLETED | OUTPATIENT
Start: 2024-07-28 | End: 2024-07-28

## 2024-07-28 RX ORDER — MAGNESIUM OXIDE 400 MG/1
400 TABLET ORAL EVERY 4 HOURS
Status: COMPLETED | OUTPATIENT
Start: 2024-07-28 | End: 2024-07-28

## 2024-07-28 RX ORDER — POTASSIUM CHLORIDE 1500 MG/1
40 TABLET, EXTENDED RELEASE ORAL ONCE
Status: COMPLETED | OUTPATIENT
Start: 2024-07-28 | End: 2024-07-28

## 2024-07-28 RX ADMIN — PREDNISONE 10 MG: 10 TABLET ORAL at 09:40

## 2024-07-28 RX ADMIN — RIVAROXABAN 20 MG: 20 TABLET, FILM COATED ORAL at 16:33

## 2024-07-28 RX ADMIN — Medication 2 CAPSULE: at 09:40

## 2024-07-28 RX ADMIN — FINASTERIDE 1 MG: 1 TABLET, FILM COATED ORAL at 09:40

## 2024-07-28 RX ADMIN — Medication 12.5 MG: at 09:30

## 2024-07-28 RX ADMIN — CETIRIZINE HYDROCHLORIDE 10 MG: 10 TABLET, FILM COATED ORAL at 21:03

## 2024-07-28 RX ADMIN — ALLOPURINOL 300 MG: 300 TABLET ORAL at 09:40

## 2024-07-28 RX ADMIN — MAGNESIUM OXIDE TAB 400 MG (241.3 MG ELEMENTAL MG) 400 MG: 400 (241.3 MG) TAB at 14:47

## 2024-07-28 RX ADMIN — SULFAMETHOXAZOLE AND TRIMETHOPRIM 1 TABLET: 800; 160 TABLET ORAL at 09:40

## 2024-07-28 RX ADMIN — POTASSIUM CHLORIDE 60 MEQ: 1500 TABLET, EXTENDED RELEASE ORAL at 09:44

## 2024-07-28 RX ADMIN — BUMETANIDE 0.5 MG/HR: 0.25 INJECTION INTRAMUSCULAR; INTRAVENOUS at 07:05

## 2024-07-28 RX ADMIN — POTASSIUM CHLORIDE 60 MEQ: 1500 TABLET, EXTENDED RELEASE ORAL at 21:03

## 2024-07-28 RX ADMIN — Medication 12.5 MG: at 21:03

## 2024-07-28 RX ADMIN — POLYETHYLENE GLYCOL 3350 17 G: 17 POWDER, FOR SOLUTION ORAL at 09:45

## 2024-07-28 RX ADMIN — MAGNESIUM OXIDE TAB 400 MG (241.3 MG ELEMENTAL MG) 400 MG: 400 (241.3 MG) TAB at 09:44

## 2024-07-28 RX ADMIN — POTASSIUM CHLORIDE 20 MEQ: 1500 TABLET, EXTENDED RELEASE ORAL at 16:33

## 2024-07-28 RX ADMIN — POTASSIUM CHLORIDE 40 MEQ: 1500 TABLET, EXTENDED RELEASE ORAL at 09:43

## 2024-07-28 ASSESSMENT — ACTIVITIES OF DAILY LIVING (ADL)
ADLS_ACUITY_SCORE: 24
ADLS_ACUITY_SCORE: 22
ADLS_ACUITY_SCORE: 24
ADLS_ACUITY_SCORE: 24
ADLS_ACUITY_SCORE: 22
ADLS_ACUITY_SCORE: 24
ADLS_ACUITY_SCORE: 22
ADLS_ACUITY_SCORE: 24
ADLS_ACUITY_SCORE: 22
ADLS_ACUITY_SCORE: 24
ADLS_ACUITY_SCORE: 22
ADLS_ACUITY_SCORE: 24
ADLS_ACUITY_SCORE: 22
ADLS_ACUITY_SCORE: 22
ADLS_ACUITY_SCORE: 24

## 2024-07-28 NOTE — PROGRESS NOTES
[facility]    Internal Medicine Hospitalist Progress Note  07/28/2024  I evaluated patient on the above date.    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    Acute on chronic diastolic CHF exacerbation (HFpEF).  Hypertension (benign essential).  AS, moderate.  [PTA: losartan 50 mg daily; metoprolol 12.5 mg BID; spironolactone 25 mg daily; torsemide 80 mg daily.]  * Echo 5/2024 showed LVEF 55%, no obvious regional wall motion abnormalities; RV borderline dilated with normal systolic function; moderate AS, mild AR.  * On admit, noted primary symptoms of significant weight gain, up to 310 pounds, diffuse edema, dyspnea. Felt to be multifactorial etiology including ongoing corticosteroid use as well as PAF.   * Wt 310 lbs on admit (notes baseline wt around 270 lbs). CXR showed mild bibasilar atelectasis/scar, no pleural effusion, normal pulmonary vascularity. Given bumetanide and chlorothiazide IV dose and started on bumetanide gtt on admit. Cardiology consulted on admit.   Vitals:    07/25/24 1714 07/26/24 0555 07/27/24 0453 07/28/24 0558   Weight: 140.8 kg (310 lb 6.4 oz) 139.4 kg (307 lb 4.8 oz) 138.8 kg (306 lb) 138.1 kg (304 lb 6.4 oz)     I/O last 3 completed shifts:  In: 1476 [P.O.:1416; I.V.:60]  Out: 5000 [Urine:5000]  Recent Labs   Lab 07/28/24  1257 07/28/24  0604 07/27/24  1611 07/27/24  0627 07/26/24  2345 07/26/24  1831 07/26/24  1408 07/26/24  1148 07/26/24  0707 07/25/24  1048 07/24/24  1507   NA  --   136  --  136 135  --  134* 133* 135 137 136   CO2  --  33*  --  32* 30*  --  30* 31* 32* 32*  --    BUN  --  51.1*  --  46.0* 48.6*  --  42.0* 40.2* 39.3* 31.1* 33.4*   CR  --  2.03*  --  1.85* 2.09*  --  1.67* 1.69* 1.76* 1.56* 1.56*   POTASSIUM 3.8 3.3* 3.8 3.3* 3.7 3.4 4.2 3.3* 2.8* 3.5 3.8   - Continue bumetanide gtt; goal is to diurese at least ~30 lbs. Received Diuril on 7/27, not re-dosing on 7/28 due to bump in creatinine.  - Continued losartan and metoprolol.  Losartan placed on hold on 7/28 due to worsening kidney function.  - Hold PTA spironolactone and torsemide for now.  - Continue 2 g sodium diet and 2 L fluid restriction.  - Continue to monitor i/o's, daily wts.  - Appreciate Cardiology help.  - AF management as noted.    Persistent A-fib, felt to be contributing to acute CHF.  * Follows with Dr. Day. H/o multiple antiarrhythmics including flecainide which he failed and amiodarone resulted in pneumonitis. H/o multiple ablations and cardioversion which have subsequently failed.   * Cardiology consulted on admit.  - Continue to monitor on telemetry.  - Continue PTA metoprolol and rivaroxaban.  - Per EP noted on 7/25/2024, EP to consider dofetilide/DCCV once fluid status is under better control. Will consider during current admission     Recent pneumonitis, suspect from relapse of inflammatory lung process (h/o pulmonary toxicity from amiodarone versus other inflammatory process), on steroids.  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 Follow-up CT 3/2024 showed bilateral interstitial infiltrates with likely component of fibrosis. Recent hospitalization 5/2024 with issues including relapsed inflammatory lung process, started on high dose steroids with slow taper by Pulmonology.  * Initial presentation as above. CXR showed mild bibasilar atelectasis/scar, no pleural effusion, normal pulmonary vascularity. O2 sats 90's on RA on admit.  - Continue prednisone 10 mg  daily through 8/8/2024; then 5 mg daily starting 8/9/2024 for 60 days.  - Continue sulfamethoxazole-trimethoprim prophylaxis - monitor renal function closely, may need to hold if cr worsens.  - Follow-up with Pulmonology (pending PFTs and repeat CT for 8/2024).    Exertional dyspnea, suspect multifactorial including CHF, recent pneumonitis.  - Continue to treat other issues as noted.    BOWEN with variable compliance secondary to frequent nocturia.  - Continue CPAP with 3L O2 while sleeping.    ROBIN on CKD likely stage III.  * Baseline creatinine 1.5.  * Cr 1.56 on admit. Started on diuresis as above.  Went up to 2.09 but improved again at 1.85 on 7/27.  Went up again to 2.03 on 7/28.  Holding off on Diuril on 7/28, continue on Bumex drip.  Recent Labs   Lab 07/28/24  0604 07/27/24  0627 07/26/24  2345 07/26/24  1408 07/26/24  1148 07/26/24  0707   CR 2.03* 1.85* 2.09* 1.67* 1.69* 1.76*   Estimated Creatinine Clearance: 44.8 mL/min (A) (based on SCr of 2.03 mg/dL (H)).  - Continue to monitor BMP  - Avoid nephrotoxic medications.    Hypokalemia and hypomagnesemia.  * Continued on scheduled potassium on admit.  Recent Labs   Lab 07/28/24  1257 07/28/24  0604 07/27/24  1611 07/27/24  0627 07/26/24  2345 07/26/24  1831 07/26/24  1148 07/26/24  0707 07/25/24  1048 07/24/24  1507 07/24/24  1141   POTASSIUM 3.8 3.3* 3.8 3.3* 3.7 3.4   < > 2.8*   < > 3.8 3.5   MAG  --  1.9  --  1.9  --   --   --  1.6*  --  2.0 1.6*   PHOS  --  4.5  --  4.0  --   --   --  4.1  --   --   --     < > = values in this interval not displayed.   - Continue scheduled potassium chloride 60 meq BID for now; monitor closely with ROBIN/CKD.  - Continue PRN K and Mg replacement - monitor K levels closely with ROBIN/CKD.    Alcohol use disorder with intermittent spells of sobriety.  * On admit, noted that pt had a few seltzer drinks the past week PTA.  - Cessation encouraged on admit.    Bell's palsy with asymmetric smile.  - Noted.    Lung nodules  * Follows  "with outpatient Pulmonology.  - Follow-up outpatient.    BPH.  - Continue PTA finasteride.    RLE wound.  * Noted on admit. WOC RN consulted.  - Continue local wound cares per WOC RN.    Severe obesity.  * Body mass index is 46.28 kg/m .  - Needs to continue to pursue aggressive dietary and lifestyle modifications.      Clinically Significant Risk Factors        # Hypokalemia: Lowest K = 3.3 mmol/L in last 2 days, will replace as needed            # Acute Kidney Injury, unspecified: based on a >150% or 0.3 mg/dL increase in last creatinine compared to past 90 day average, will monitor renal function  # Hypertension: Noted on problem list  # Acute heart failure with preserved ejection fraction: heart failure noted on problem list, last echo with EF >50%, and receiving IV diuretics           # Severe Obesity: Estimated body mass index is 46.28 kg/m  as calculated from the following:    Height as of an earlier encounter on 7/25/24: 1.727 m (5' 8\").    Weight as of this encounter: 138.1 kg (304 lb 6.4 oz)., PRESENT ON ADMISSION       # Financial/Environmental Concerns: none         Diet: Fluid restriction 2000 ML FLUID  Combination Diet Regular Diet Adult; Moderate Consistent Carb (60 g CHO per Meal) Diet; 2 gm NA Diet; Low Saturated Fat Diet    Prophylaxis: PCD's, ambulation, and is on DOAC.  Palmer Catheter: Not present  Lines: None     Code Status: Full Code    Disposition Plan   Medically Ready for Discharge: Anticipated in 2-4 Days  Expected discharge to home pending stability/improvement/resolution of above acute issues.    Entered: Masha Moy MD 07/28/2024, 6:05 PM     Communication.      TIME SPENT: I spent approximately 45 minutes bedside and on the inpatient unit today managing the care of patient. Over 50% of my time on the unit was spent in extensive chart review, counseling the patient/family and/or coordinating care regarding services listed in this note.        Interval History   Stable overnight.  " Weight came down by 2 pounds overnight, is urinating a lot.  Breathing and leg swelling feels better.  However creatinine bumped up today.  No fevers or chills.    * Data reviewed today: I reviewed all new labs and imaging over the last 24 hours. I personally reviewed labs    Physical Exam   Most recent vitals:   , Blood pressure (!) 108/92, pulse 83, temperature 97.8  F (36.6  C), temperature source Oral, resp. rate 18, weight 138.1 kg (304 lb 6.4 oz), SpO2 92%. O2 Device: None (Room air)    Vitals:    07/26/24 0555 07/27/24 0453 07/28/24 0558   Weight: 139.4 kg (307 lb 4.8 oz) 138.8 kg (306 lb) 138.1 kg (304 lb 6.4 oz)     Vital signs with ranges:  Temp:  [97.2  F (36.2  C)-97.8  F (36.6  C)] 97.8  F (36.6  C)  Pulse:  [] 83  Resp:  [18] 18  BP: (100-130)/() 108/92  SpO2:  [92 %-93 %] 92 %  Patient Vitals for the past 24 hrs:   BP Temp Temp src Pulse Resp SpO2 Weight   07/28/24 1626 (!) 108/92 97.8  F (36.6  C) Oral 83 18 92 % --   07/28/24 1324 115/82 97.7  F (36.5  C) Oral 91 18 93 % --   07/28/24 1050 120/86 -- -- -- -- -- --   07/28/24 1045 (!) 130/104 -- -- 101 -- -- --   07/28/24 1020 (!) 118/92 -- -- 92 -- 92 % --   07/28/24 0759 125/86 97.5  F (36.4  C) Oral -- 18 93 % --   07/28/24 0558 -- -- -- -- -- -- 138.1 kg (304 lb 6.4 oz)   07/27/24 2024 100/72 97.2  F (36.2  C) Oral 97 -- 92 % --     I/O's last 24 hours:  I/O last 3 completed shifts:  In: 1476 [P.O.:1416; I.V.:60]  Out: 5000 [Urine:5000]    Constitutional: awake, alert, oriented, pleasant, conversant, sitting up in chair  Cardiovascular: Irregular, systolic murmur noted, bilateral lower extremity 2+ pitting edema noted  Lungs: diminished in the bases, no crackles or wheezes  Gastrointestinal/Abdomen: soft, non-tender, non-distended, positive bowel sounds  Skin: bilateral lower extremities with 2+ pitting edema  Neurologic: Alert and conversing appropriately, moving all extremities, fluent speech  Psychiatric: Calm and pleasant    "    Data    Labs reviewed.  Recent Labs   Lab 07/28/24  1257 07/28/24  0604 07/27/24  1611 07/27/24  0627 07/26/24  2345 07/26/24  1148 07/26/24  0707   WBC  --   --   --  9.8  --   --  10.5   HGB  --   --   --  13.7  --   --  13.5   MCV  --   --   --  98  --   --  99   PLT  --   --   --  231  --   --  215   NA  --  136  --  136 135   < > 135   POTASSIUM 3.8 3.3* 3.8 3.3* 3.7   < > 2.8*   CHLORIDE  --  93*  --  94* 92*   < > 92*   CO2  --  33*  --  32* 30*   < > 32*   BUN  --  51.1*  --  46.0* 48.6*   < > 39.3*   CR  --  2.03*  --  1.85* 2.09*   < > 1.76*   ANIONGAP  --  10  --  10 13   < > 11   ASHELY  --  9.0  --  8.8 9.0   < > 8.9   GLC  --  123*  --  120* 124*   < > 119*    < > = values in this interval not displayed.     Recent Labs   Lab Test 05/05/24  1515 05/05/24  1003 02/11/24  0848 02/11/24  0220 11/28/23  0623   NT-PROBNP, INPATIENT  --  1,202*  --  442 160   TROPONIN T HIGH SENSITIVITY 85* 90* 59* 57*  --      Recent Labs   Lab 07/28/24  0604 07/27/24  0627 07/26/24  2345 07/26/24  1408 07/26/24  1148 07/26/24  0707   * 120* 124* 185* 141* 119*     Recent Labs   Lab Test 02/25/19  0756 10/12/17  0814   A1C 5.7* 5.7       No results for input(s): \"INR\", \"VNDNAC84AKAL\" in the last 168 hours.  Recent Labs   Lab 07/27/24  0627 07/26/24  0707   WBC 9.8 10.5       MICRO:  CULTURES (INCLUDING BLOOD AND URINE):  No lab results found in last 7 days.    No results found for this or any previous visit (from the past 24 hour(s)).      Medications   All medications were reviewed. MAR.    Infusions:  Current Facility-Administered Medications   Medication Dose Route Frequency Provider Last Rate Last Admin    - MEDICATION INSTRUCTIONS -   Does not apply DOES NOT GO TO Courtney Blackmon APRN CNP        bumetanide (BUMEX) 0.25 mg/mL infusion  0.5 mg/hr Intravenous Continuous Courtney Roy APRN CNP 2 mL/hr at 07/28/24 1600 0.5 mg/hr at 07/28/24 1600    Continuing ACE inhibitor/ARB/ARNI from home medication list " OR ACE inhibitor/ARB/ARNI order already placed during this visit   Does not apply DOES NOT GO TO Courtney Blackmon APRN CNP        Continuing beta blocker from home medication list OR beta blocker order already placed during this visit   Does not apply DOES NOT GO TO Courtney Blackmon APRN CNP        Patient is already receiving anticoagulation with heparin, enoxaparin (LOVENOX), warfarin (COUMADIN)  or other anticoagulant medication   Does not apply Continuous PRN Courtney Roy APRN CNP         Scheduled Medications:  Current Facility-Administered Medications   Medication Dose Route Frequency Provider Last Rate Last Admin    allopurinol (ZYLOPRIM) tablet 300 mg  300 mg Oral Daily Courtney Roy APRN CNP   300 mg at 07/28/24 0940    cetirizine (zyrTEC) tablet 10 mg  10 mg Oral QPM Courtney Roy APRN CNP   10 mg at 07/27/24 2044    finasteride (PROPECIA) tablet 1 mg  1 mg Oral Daily Courtney Roy APRN CNP   1 mg at 07/28/24 0940    [Held by provider] losartan (COZAAR) tablet 50 mg  50 mg Oral Daily Courtney Roy APRN CNP   50 mg at 07/27/24 0829    metoprolol tartrate (LOPRESSOR) half-tab 12.5 mg  12.5 mg Oral BID Courtney Roy APRN CNP   12.5 mg at 07/28/24 0930    polyethylene glycol (MIRALAX) Packet 17 g  17 g Oral Daily Courtney Roy APRN CNP   17 g at 07/28/24 0945    potassium chloride pushpa ER (KLOR-CON M20) CR tablet 60 mEq  60 mEq Oral BID Courtney Roy APRN CNP   60 mEq at 07/28/24 0944    predniSONE (DELTASONE) tablet 10 mg  10 mg Oral Daily Courtney Roy APRN CNP   10 mg at 07/28/24 0940    Followed by    [START ON 8/9/2024] predniSONE (DELTASONE) tablet 5 mg  5 mg Oral Daily Courtney Roy APRN CNP        psyllium (METAMUCIL/KONSYL) capsule 1-2 capsule  1-2 capsule Oral Daily Courtney Roy APRN CNP   2 capsule at 07/28/24 0940    rivaroxaban ANTICOAGULANT (XARELTO) tablet 20 mg  20 mg Oral Daily with supper Courtney Roy APRN CNP   20 mg at 07/28/24 1633    sodium  chloride (PF) 0.9% PF flush 3 mL  3 mL Intracatheter Q8H Courtney Roy APRN CNP   3 mL at 07/27/24 1050    [Held by provider] spironolactone (ALDACTONE) tablet 25 mg  25 mg Oral Daily Courtney Roy APRN CNP        sulfamethoxazole-trimethoprim (BACTRIM DS) 800-160 MG per tablet 1 tablet  1 tablet Oral Daily Courtney Roy APRN CNP   1 tablet at 07/28/24 0940    [Held by provider] torsemide (DEMADEX) tablet 80 mg  80 mg Oral Daily Courtney Roy APRN CNP         PRN Medications:  Current Facility-Administered Medications   Medication Dose Route Frequency Provider Last Rate Last Admin    - MEDICATION INSTRUCTIONS -   Does not apply DOES NOT GO TO Courtney Blackmon APRN CNP        acetaminophen (TYLENOL) tablet 650 mg  650 mg Oral Q4H PRN Courtney Roy APRN CNP        Or    acetaminophen (TYLENOL) Suppository 650 mg  650 mg Rectal Q4H PRN Courtney Roy APRN CNP        calcium carbonate (TUMS) chewable tablet 1,000 mg  1,000 mg Oral 4x Daily PRN Courtney Roy APRN CNP        Continuing ACE inhibitor/ARB/ARNI from home medication list OR ACE inhibitor/ARB/ARNI order already placed during this visit   Does not apply DOES NOT GO TO Courtney Blackmon APRN CNP        Continuing beta blocker from home medication list OR beta blocker order already placed during this visit   Does not apply DOES NOT GO TO Courtney Blackmon APRN CNP        lidocaine (LMX4) cream   Topical Q1H PRN Courtney Roy APRN CNP        lidocaine 1 % 0.1-1 mL  0.1-1 mL Other Q1H PRN Courtney Roy APRN CNP        melatonin tablet 1 mg  1 mg Oral At Bedtime PRN Courtney Roy APRN CNP        ondansetron (ZOFRAN ODT) ODT tab 4 mg  4 mg Oral Q6H PRN Courtney Roy APRN CNP        Or    ondansetron (ZOFRAN) injection 4 mg  4 mg Intravenous Q6H PRN Brokl, Courtney Rebeka, APRN CNP        Patient is already receiving anticoagulation with heparin, enoxaparin (LOVENOX), warfarin (COUMADIN)  or other anticoagulant medication   Does not  apply Continuous PRN Courtney Roy APRN CNP        prochlorperazine (COMPAZINE) injection 5 mg  5 mg Intravenous Q6H ANGELICAN Courtney Roy APRN CNP        Or    prochlorperazine (COMPAZINE) tablet 5 mg  5 mg Oral Q6H PRN Courtney Roy APRN CNP        Or    prochlorperazine (COMPAZINE) suppository 12.5 mg  12.5 mg Rectal Q12H PRN Courtney Roy APRN CNP        senna-docusate (SENOKOT-S/PERICOLACE) 8.6-50 MG per tablet 1 tablet  1 tablet Oral BID PRN Courtney Roy APRN CNP        Or    senna-docusate (SENOKOT-S/PERICOLACE) 8.6-50 MG per tablet 2 tablet  2 tablet Oral BID PRN Courtney Roy APRN CNP        sodium chloride (PF) 0.9% PF flush 3 mL  3 mL Intracatheter q1 min Courtney Ramsey APRN CNP

## 2024-07-28 NOTE — PLAN OF CARE
VSS, no c/o pain, diuresing on bumex gtt, replacing electrolytes, trending creat, fluid restriction of 2000, continue to monitor closely.

## 2024-07-28 NOTE — PROGRESS NOTES
A&O x 4, VSS on RA, denied pain. DELEON with miminal activity, Bumex gtt at 0.5 mg/hour. 3+ edeam to BLE, Cr 2.03 K 3.3 replacement order. Mg 1.9. Up SBA, urinal urinal with adequate urine output. Fluid restriction 2000 mL. Plan for diuresing. Continue plan of care.

## 2024-07-28 NOTE — PROGRESS NOTES
[facility] Cardiology Progress Note    Date of Admission:  7/25/2024  Reason for Consult:   CHF     Subjective   Mr. Schultz feels somewhat better, continues to be fluid overload. No acute events overnight    Objective   INTAKE / OUTPUT     Intake/Output Summary (Last 24 hours) at 7/26/2024 2059  Last data filed at 7/26/2024 1530  Gross per 24 hour   Intake 1125 ml   Output 4650 ml   Net -3525 ml       VITAL SIGNS  Temp:  [97.2  F (36.2  C)-98.2  F (36.8  C)] 97.5  F (36.4  C)  Pulse:  [] 97  Resp:  [18] 18  BP: (100-153)/() 125/86  SpO2:  [83 %-93 %] 93 %  304 lbs 6.4 oz    PHYSICAL EXAM   Constitutional: Appears stated age, well nourished, NAD. On NC  Neck: Supple.  JVD at earlobe.   Respiratory: Non-labored. Lungs CTAB.  Cardiovascular: Irregular rate/rhythm. Bilateral lower extremities with +2 edema.   GI: Soft, non-distended, non-tender.  Skin: Warm and dry.   Musculoskeletal/Extremities: Symmetrical movement.  Neurologic: No gross focal deficits. Alert, awake.  Psychiatric: Affect appropriate. Mentation normal.      Data   Most Recent 3 CBC's:  Recent Labs   Lab Test 07/27/24  0627 07/26/24  0707 05/16/24  1052   WBC 9.8 10.5 16.4*   HGB 13.7 13.5 15.0   MCV 98 99 98    215 372     Most Recent 3  BMP's:  Recent Labs   Lab Test 07/28/24  0604 07/27/24  1611 07/27/24  0627 07/26/24  2345     --  136 135   POTASSIUM 3.3* 3.8 3.3* 3.7   CHLORIDE 93*  --  94* 92*   CO2 33*  --  32* 30*   BUN 51.1*  --  46.0* 48.6*   CR 2.03*  --  1.85* 2.09*   ANIONGAP 10  --  10 13   ASHELY 9.0  --  8.8 9.0   *  --  120* 124*     Most Recent 3 BNP's:  Recent Labs   Lab Test 05/05/24  1003 02/11/24  0220 11/28/23  0623 11/25/23  1934 11/24/23  1215 11/29/16  1840   NTBNPI 1,202* 442 160   < >  --   --    NTBNP  --   --   --   --  274 720*    < > = values in this interval not displayed.      Most Recent Cholesterol Panel:  Recent Labs   Lab Test  07/27/23  0955   CHOL 163   LDL 97   HDL 39*   TRIG 133       Most Recent Transthoracic Echocardiogram:  Echo result w/o MOPS: Interpretation Summary Technically challenging study due to patient body habitus. The left ventricle is borderline dilated. There is mild concentric leftventricular hypertrophy.Left ventricular systolic function is low normal. The visual ejection fractionis estimated at 55%. Regional wall motion challenging to assess due tofrequent ectopy/irregular R-R intervals however no obvious regional wallmotion abnormalities seen.The right ventricle is borderline dilated. The right ventricular systolicfunction is normal.Moderate aortic stenosis, Vmax 3.7 m/s, mean gradient 35 mmHg, MARILYN 1.1cm2, DI0.26. SVi 44 cc/m2. There is mild (1+) aortic regurgitation. Aortic valvemorphology is not well visualized, however it is heavily calcified.There is no pericardial effusion.The inferior vena cava was normal in size with preserved respiratoryvariability. This study was compared to a TTE from 2/11/2024. Global LV systolic functionis marginally less dynamic on this present study.        Most Recent Cardiac Catheterization:  No results found.    Assessment & Plan     Assessment     Acute heart failure exacerbation   LVEF: 55% - 5/2024  Fluid Status: hypervolemic, thinks his EDW is 250#, discharge weight from previous admission in 5/2024 was 278#  Diuretic Regimen: torsemide 80 mg daily   Ischemic Eval: lexiscan stress test 5/2024 noted below   GDMT PTA:  Beta blocker: metoprolol tartrate 12.5 mg BID   ACEI/ARB/ARNI: losartan 50 mg daily   Aldactone antagonist: spironolactone 25 mg daily   SGLT2 inhibitor: none     Persistent atrial fibrillation with multiple attempts at rhythm control, cardioversions, and ablation attempts  [id number] Score 4 (age, CHF, HTN, aortic plaque)  Anticoagulated with Xarelto   Per EP noted on 7/25/2024, EP to consider dofetilide/DCCV once fluid status is under better control. Will  consider during current admission      Hypokalemia      Moderate aortic stenosis      History of abnormal nuclear stress test during admission 5/2024  Lexiscan stress test 5/2024- abnormal, small area of ischemia in the apical segments of the left ventricle, small area of nontransmural infarction in the basal inferolateral segments of the left ventricle associated with a mild degree of iva-infarct ischemia involving basal to mid inferior wall   This was thought to possibly be a false positive finding due to body habitus, but recommendation was a left and right heart cath when patients pulmonary status returned to baseline     HTN   BOWEN, compliant with CPAP   Myocardial scar noted on cardiac MRI   History of alcohol dependence     Mr. Mike Schultz is a very pleasant 73 year old male with a past medical history of  persistent atrial fibrillation, HFpEF, moderate aortic stenosis, hypertension, BOWEN, alcohol use disorder, Bell's palsy, lung nodules who was admitted from the EP clinic after no response after infusion clinic on 7/24/2024. He remains 30# above discharge weight from 5/2024 (278#), continues to have dyspnea on exertion, and lower extremity edema 7/25/2024.      Patient states that for the past month his dyspnea on exertion has become worse. He used to mobilize and his oxygen saturation remained around 91-92%. Within the last month, his oxygen saturation has been within the 80s with exertion and he has noticed an increase in lower extremity edema. This has limited his level of activity. He states he cannot do much. He denies chest pain, palpitations, and orthopnea.     Interval history: the patient is diuresing very well. Kidney function slightly worse today. Will hold off on diuril for today and continue bumex gtt as he's still fluid overloaded    Plan     Continue bumex gtt at 0.5 mg/hr  Will hold Losartan given kidney function   Continue the following cardiology medications   Metoprolol tartrate 12.5  mg BID   Xarelto 20 mg daily   Daily standing weight, strict I/O, daily BMP and repletion of electrolytes   Coordinating outpatient follow up   Cardiology to follow    Thank you for involving us in the care of this patient.     Nahed Hall MD on 7/26/2024 at 8:59 PM

## 2024-07-29 ENCOUNTER — APPOINTMENT (OUTPATIENT)
Dept: ULTRASOUND IMAGING | Facility: CLINIC | Age: 74
DRG: 286 | End: 2024-07-29
Attending: PHYSICIAN ASSISTANT
Payer: COMMERCIAL

## 2024-07-29 ENCOUNTER — APPOINTMENT (OUTPATIENT)
Dept: OCCUPATIONAL THERAPY | Facility: CLINIC | Age: 74
DRG: 286 | End: 2024-07-29
Attending: STUDENT IN AN ORGANIZED HEALTH CARE EDUCATION/TRAINING PROGRAM
Payer: COMMERCIAL

## 2024-07-29 LAB
ANION GAP SERPL CALCULATED.3IONS-SCNC: 10 MMOL/L (ref 7–15)
BUN SERPL-MCNC: 42.7 MG/DL (ref 8–23)
CALCIUM SERPL-MCNC: 9.1 MG/DL (ref 8.8–10.4)
CHLORIDE SERPL-SCNC: 93 MMOL/L (ref 98–107)
CREAT SERPL-MCNC: 1.58 MG/DL (ref 0.67–1.17)
EGFRCR SERPLBLD CKD-EPI 2021: 46 ML/MIN/1.73M2
GLUCOSE SERPL-MCNC: 119 MG/DL (ref 70–99)
HCO3 SERPL-SCNC: 34 MMOL/L (ref 22–29)
MAGNESIUM SERPL-MCNC: 1.9 MG/DL (ref 1.7–2.3)
PHOSPHATE SERPL-MCNC: 3.8 MG/DL (ref 2.5–4.5)
POTASSIUM SERPL-SCNC: 3.1 MMOL/L (ref 3.4–5.3)
POTASSIUM SERPL-SCNC: 4 MMOL/L (ref 3.4–5.3)
SODIUM SERPL-SCNC: 137 MMOL/L (ref 135–145)

## 2024-07-29 PROCEDURE — 76705 ECHO EXAM OF ABDOMEN: CPT

## 2024-07-29 PROCEDURE — 99232 SBSQ HOSP IP/OBS MODERATE 35: CPT | Performed by: HOSPITALIST

## 2024-07-29 PROCEDURE — 84132 ASSAY OF SERUM POTASSIUM: CPT | Performed by: HOSPITALIST

## 2024-07-29 PROCEDURE — 97110 THERAPEUTIC EXERCISES: CPT | Mod: GO

## 2024-07-29 PROCEDURE — 250N000009 HC RX 250: Performed by: NURSE PRACTITIONER

## 2024-07-29 PROCEDURE — 83735 ASSAY OF MAGNESIUM: CPT | Performed by: INTERNAL MEDICINE

## 2024-07-29 PROCEDURE — 250N000013 HC RX MED GY IP 250 OP 250 PS 637: Performed by: NURSE PRACTITIONER

## 2024-07-29 PROCEDURE — 250N000012 HC RX MED GY IP 250 OP 636 PS 637: Performed by: NURSE PRACTITIONER

## 2024-07-29 PROCEDURE — 80048 BASIC METABOLIC PNL TOTAL CA: CPT | Performed by: HOSPITALIST

## 2024-07-29 PROCEDURE — 210N000002 HC R&B HEART CARE

## 2024-07-29 PROCEDURE — 99233 SBSQ HOSP IP/OBS HIGH 50: CPT | Mod: FS | Performed by: PHYSICIAN ASSISTANT

## 2024-07-29 PROCEDURE — 84100 ASSAY OF PHOSPHORUS: CPT | Performed by: INTERNAL MEDICINE

## 2024-07-29 PROCEDURE — 250N000013 HC RX MED GY IP 250 OP 250 PS 637: Performed by: HOSPITALIST

## 2024-07-29 PROCEDURE — 36415 COLL VENOUS BLD VENIPUNCTURE: CPT | Performed by: HOSPITALIST

## 2024-07-29 RX ORDER — MAGNESIUM OXIDE 400 MG/1
400 TABLET ORAL EVERY 4 HOURS
Status: COMPLETED | OUTPATIENT
Start: 2024-07-29 | End: 2024-07-29

## 2024-07-29 RX ORDER — POTASSIUM CHLORIDE 1500 MG/1
40 TABLET, EXTENDED RELEASE ORAL ONCE
Status: COMPLETED | OUTPATIENT
Start: 2024-07-29 | End: 2024-07-29

## 2024-07-29 RX ADMIN — PREDNISONE 10 MG: 10 TABLET ORAL at 08:31

## 2024-07-29 RX ADMIN — POLYETHYLENE GLYCOL 3350 17 G: 17 POWDER, FOR SOLUTION ORAL at 08:34

## 2024-07-29 RX ADMIN — FINASTERIDE 1 MG: 1 TABLET, FILM COATED ORAL at 08:37

## 2024-07-29 RX ADMIN — Medication 2 CAPSULE: at 08:37

## 2024-07-29 RX ADMIN — Medication 400 MG: at 08:31

## 2024-07-29 RX ADMIN — POTASSIUM CHLORIDE 60 MEQ: 1500 TABLET, EXTENDED RELEASE ORAL at 10:22

## 2024-07-29 RX ADMIN — Medication 12.5 MG: at 21:46

## 2024-07-29 RX ADMIN — Medication 12.5 MG: at 08:30

## 2024-07-29 RX ADMIN — BUMETANIDE 0.5 MG/HR: 0.25 INJECTION INTRAMUSCULAR; INTRAVENOUS at 06:05

## 2024-07-29 RX ADMIN — Medication 400 MG: at 14:58

## 2024-07-29 RX ADMIN — POTASSIUM CHLORIDE 60 MEQ: 1500 TABLET, EXTENDED RELEASE ORAL at 21:46

## 2024-07-29 RX ADMIN — POTASSIUM CHLORIDE 40 MEQ: 1500 TABLET, EXTENDED RELEASE ORAL at 08:31

## 2024-07-29 RX ADMIN — SULFAMETHOXAZOLE AND TRIMETHOPRIM 1 TABLET: 800; 160 TABLET ORAL at 08:31

## 2024-07-29 RX ADMIN — CETIRIZINE HYDROCHLORIDE 10 MG: 10 TABLET, FILM COATED ORAL at 21:46

## 2024-07-29 RX ADMIN — RIVAROXABAN 20 MG: 20 TABLET, FILM COATED ORAL at 17:32

## 2024-07-29 RX ADMIN — ALLOPURINOL 300 MG: 300 TABLET ORAL at 08:31

## 2024-07-29 ASSESSMENT — ACTIVITIES OF DAILY LIVING (ADL)
ADLS_ACUITY_SCORE: 24

## 2024-07-29 NOTE — PLAN OF CARE
Pt A&O x4. On room air and bumex drip infusing @ 0.5 mg/hr. Independent in the room. Voiding in the urinal w/ good output. On a 2 gram sodium diet. High mag, k, phos protocol. Plan of care ongoing.

## 2024-07-29 NOTE — PLAN OF CARE
Goal Outcome Evaluation:  Neuro- A&OX4  Most Recent Vitals- Temp: 97.5  F (36.4  C) Temp src: Oral BP: (!) 134/96 Pulse: 92   Resp: 16 SpO2: 94 % O2 Device: None (Room air)   Tele/Cardiac- A-fib CVR  Resp- RA  Activity- independent   Pain- Denies   Drips- none   Drains/Tubes- P-IV X1  Skin- Right shin wound   GI/- Uses urinal, good urine out put   Aggression Color- Green  COVID status- Negative  Plan- Possible R/LHC & EP consult tomorrow.    Misc-     Osito Ferrell RN

## 2024-07-29 NOTE — PLAN OF CARE
A&O x 4. Patient denies pain. VSS, on RA. Up ad juan ramon in the room. Tele: AF CVR. Bumex gtt @0.5 with good response. K protocol and scheduled K replacement. Recheck in AM. Plan for continued diuresing and possible DCCV when adequately diuresed per EP note. Continue to Monitor.

## 2024-07-29 NOTE — PROGRESS NOTES
[facility] Cardiology Progress Note  Date of Service: 07/29/2024  Primary Cardiologist: Dr. Barb Mckeon [place] Schultz is a 73 year old male admitted on 7/25/2024 with acute on chronic HFpEF, likely triggered by recurrent/persistent atrial fibrillation. Diuresis with IV bumex @ 0.5 mg/hr and diuril.     Pt feeling significantly better today. Edema mild. Renal fn improved.    Assessment:  Acute HFpEF  -- EDW is ~250# (?) - currently 300 lbs. Net -11L.  Persistent atrial fibrillation with multiple attempts at rhythm control, cardioversions, and ablation attempts, with pneumonitis 2/2 amiodarone  -- [id number] Score 4 (age, CHF, HTN, aortic plaque)  -- Anticoagulated with Xarelto   -- EP to consider dofetilide/DCCV once fluid status is under better control.   Hypokalemia   ROBIN/CKD   -- Significant improvement from yesterday with holding diuril and continuing bumex  Moderate aortic stenosis   History of abnormal nuclear stress test during admission 5/2024  -- Lexiscan stress test 5/2024- abnormal, small area of ischemia in the apical segments of the left ventricle, small area of nontransmural infarction in the basal inferolateral segments of the left ventricle associated with a mild degree of iva-infarct ischemia involving basal to mid inferior wall   -- This was thought to possibly be a false positive finding due to body habitus, but recommendation was a left and right heart cath when patients pulmonary status returned to baseline     Amiodarone pneumonitis  HTN   BOWEN, compliant with CPAP   Myocardial scar noted on cardiac MRI   History of alcohol dependence     Plan:   Continue bumex.   R/LHC - will discuss timing with Dr. Harris, anticipate tomorrow vs Wednesday.   EP consultation tomorrow for consideration of AARx and potential cardioversion.     Plan was formulated under direction of Dr. Harris.   Malika Blanco PA-C  [facility]  Pager: [phone]  Text Page   (7:30am - 4pm M-F)   __________________________________________________________________________  Physical Exam   Temp: 97.7  F (36.5  C) Temp src: Oral BP: 125/83 Pulse: 90   Resp: 16 SpO2: 92 % O2 Device: None (Room air)    Vitals:    07/27/24 0453 07/28/24 0558 07/29/24 0400   Weight: 138.8 kg (306 lb) 138.1 kg (304 lb 6.4 oz) 136.8 kg (301 lb 9.6 oz)       GENERAL:  The patient is in no apparent distress.   NECK: CVP appears normal, no masses or thyromegaly.  PULMONARY:  There is a normal respiratory effort. Clear lungs to auscultation bilaterally.   CARDIOVASCULAR:  Irregularly irregular, variable S1, no m/r/g.  GI:  Non tender abdomen with normoactive bowel sounds and no hepatosplenomegaly. There are no masses palpable.   EXTREMITIES:  No clubbing, cyanosis or edema.  VASCULAR: 2+ Pulses bilaterally in upper and lower extremities.    Medications   Current Facility-Administered Medications   Medication Dose Route Frequency Provider Last Rate Last Admin    - MEDICATION INSTRUCTIONS -   Does not apply DOES NOT GO TO Courtney Blackmon APRN CNP        bumetanide (BUMEX) 0.25 mg/mL infusion  0.5 mg/hr Intravenous Continuous Courtney Roy APRN CNP 2 mL/hr at 07/29/24 0840 0.5 mg/hr at 07/29/24 0840    Continuing ACE inhibitor/ARB/ARNI from home medication list OR ACE inhibitor/ARB/ARNI order already placed during this visit   Does not apply DOES NOT GO TO Courtney Blackmon APRN CNP        Continuing beta blocker from home medication list OR beta blocker order already placed during this visit   Does not apply DOES NOT GO TO Courtney Blackmon APRN CNP        Patient is already receiving anticoagulation with heparin, enoxaparin (LOVENOX), warfarin (COUMADIN)  or other anticoagulant medication   Does not apply Continuous PRN Courtney Roy APRN CNP         Current Facility-Administered Medications   Medication Dose Route Frequency Provider Last Rate Last Admin    allopurinol (ZYLOPRIM) tablet 300 mg  300 mg  Oral Daily Courtney Roy APRN CNP   300 mg at 07/29/24 0831    cetirizine (zyrTEC) tablet 10 mg  10 mg Oral QPM Courtney Roy APRN CNP   10 mg at 07/28/24 2103    finasteride (PROPECIA) tablet 1 mg  1 mg Oral Daily Courtney Roy APRN CNP   1 mg at 07/29/24 0837    [Held by provider] losartan (COZAAR) tablet 50 mg  50 mg Oral Daily Courtney Roy APRN CNP   50 mg at 07/27/24 0829    magnesium oxide (MAG-OX) tablet 400 mg  400 mg Oral Q4H Masha Moy MD   400 mg at 07/29/24 0831    metoprolol tartrate (LOPRESSOR) half-tab 12.5 mg  12.5 mg Oral BID Courtney Roy APRN CNP   12.5 mg at 07/29/24 0830    polyethylene glycol (MIRALAX) Packet 17 g  17 g Oral Daily Courtney Roy APRN CNP   17 g at 07/29/24 0834    potassium chloride pushpa ER (KLOR-CON M20) CR tablet 60 mEq  60 mEq Oral BID Courtney Roy APRN CNP   60 mEq at 07/29/24 1022    predniSONE (DELTASONE) tablet 10 mg  10 mg Oral Daily Courtney Roy APRN CNP   10 mg at 07/29/24 0831    Followed by    [START ON 8/9/2024] predniSONE (DELTASONE) tablet 5 mg  5 mg Oral Daily Courtney Roy APRN CNP        psyllium (METAMUCIL/KONSYL) capsule 1-2 capsule  1-2 capsule Oral Daily Courtney Roy APRN CNP   2 capsule at 07/29/24 0837    rivaroxaban ANTICOAGULANT (XARELTO) tablet 20 mg  20 mg Oral Daily with supper Courtney Roy APRN CNP   20 mg at 07/28/24 1633    sodium chloride (PF) 0.9% PF flush 3 mL  3 mL Intracatheter Q8H Courtney Roy APRN CNP   3 mL at 07/27/24 1050    [Held by provider] spironolactone (ALDACTONE) tablet 25 mg  25 mg Oral Daily Courtney Roy APRN CNP        sulfamethoxazole-trimethoprim (BACTRIM DS) 800-160 MG per tablet 1 tablet  1 tablet Oral Daily Courtney Roy APRN CNP   1 tablet at 07/29/24 0831    [Held by provider] torsemide (DEMADEX) tablet 80 mg  80 mg Oral Daily Courtney Roy APRN CNP           Data   Most Recent 3 CBC's:  Recent Labs   Lab Test 07/27/24  0627 07/26/24  0707 05/16/24  1052    WBC 9.8 10.5 16.4*   HGB 13.7 13.5 15.0   MCV 98 99 98    215 372     Most Recent 3 BMP's:  Recent Labs   Lab Test 07/29/24  0552 07/28/24  1257 07/28/24  0604 07/27/24  1611 07/27/24  0627     --  136  --  136   POTASSIUM 3.1* 3.8 3.3*   < > 3.3*   CHLORIDE 93*  --  93*  --  94*   CO2 34*  --  33*  --  32*   BUN 42.7*  --  51.1*  --  46.0*   CR 1.58*  --  2.03*  --  1.85*   ANIONGAP 10  --  10  --  10   ASHELY 9.1  --  9.0  --  8.8   *  --  123*  --  120*    < > = values in this interval not displayed.

## 2024-07-29 NOTE — PROGRESS NOTES
[facility]    Internal Medicine Hospitalist Progress Note  07/29/2024  I evaluated patient on the above date.    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    Acute on chronic diastolic CHF exacerbation (HFpEF).  Hypertension (benign essential).  AS, moderate.  [PTA: losartan 50 mg daily; metoprolol 12.5 mg BID; spironolactone 25 mg daily; torsemide 80 mg daily.]  * Echo 5/2024 showed LVEF 55%, no obvious regional wall motion abnormalities; RV borderline dilated with normal systolic function; moderate AS, mild AR.  * On admit, noted primary symptoms of significant weight gain, up to 310 pounds, diffuse edema, dyspnea. Felt to be multifactorial etiology including ongoing corticosteroid use as well as PAF.   * Wt 310 lbs on admit (notes baseline wt around 270 lbs). CXR showed mild bibasilar atelectasis/scar, no pleural effusion, normal pulmonary vascularity. Given bumetanide and chlorothiazide IV dose and started on bumetanide gtt on admit. Cardiology consulted on admit.   Vitals:    07/25/24 1714 07/26/24 0555 07/27/24 0453 07/28/24 0558   Weight: 140.8 kg (310 lb 6.4 oz) 139.4 kg (307 lb 4.8 oz) 138.8 kg (306 lb) 138.1 kg (304 lb 6.4 oz)    07/29/24 0400   Weight: 136.8 kg (301 lb 9.6 oz)     I/O last 3 completed shifts:  In: 775 [P.O.:775]  Out: 3900 [Urine:3900]  Recent Labs   Lab 07/29/24  0552 07/28/24  1257 07/28/24  0604 07/27/24  1611 07/27/24  0627 07/26/24  2345 07/26/24  1831 07/26/24  1408 07/26/24  1148  07/26/24  0707 07/25/24  1048     --  136  --  136 135  --  134* 133* 135 137   CO2 34*  --  33*  --  32* 30*  --  30* 31* 32* 32*   BUN 42.7*  --  51.1*  --  46.0* 48.6*  --  42.0* 40.2* 39.3* 31.1*   CR 1.58*  --  2.03*  --  1.85* 2.09*  --  1.67* 1.69* 1.76* 1.56*   POTASSIUM 3.1* 3.8 3.3* 3.8 3.3* 3.7 3.4 4.2 3.3* 2.8* 3.5   - Continue bumetanide gtt; goal is to diurese at least ~30 lbs. Received Diuril on 7/27, no re-dosing on 7/28 due to bump in creatinine.  - Continued losartan and metoprolol.  Losartan placed on hold on 7/28 due to worsening kidney function.  - Hold PTA spironolactone and torsemide for now.  -Diuresing well, so far net -11 L.  Continues on IV Bumex drip on 7/29.  Anticipate right and left heart catheterization in the next day or 2, also EP consult tomorrow for consideration of potential cardioversion/antiarrhythmics.  - Continue 2 g sodium diet and 2 L fluid restriction.  - Continue to monitor i/o's, daily wts.  - Appreciate Cardiology help.  - AF management as noted.    Persistent A-fib, felt to be contributing to acute CHF.  * Follows with Dr. Day. H/o multiple antiarrhythmics including flecainide which he failed and amiodarone resulted in pneumonitis. H/o multiple ablations and cardioversion which have subsequently failed.   * Cardiology consulted on admit.  - Continue to monitor on telemetry.  - Continue PTA metoprolol and rivaroxaban.  - Per EP noted on 7/25/2024, EP to consider dofetilide/DCCV once fluid status is under better control.  Anticipate EP consult tomorrow.    Recent pneumonitis, suspect from relapse of inflammatory lung process (h/o pulmonary toxicity from amiodarone versus other inflammatory process), on steroids.  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 Follow-up CT 3/2024 showed bilateral interstitial infiltrates with likely component of fibrosis. Recent hospitalization 5/2024 with issues including relapsed inflammatory  lung process, started on high dose steroids with slow taper by Pulmonology.  * Initial presentation as above. CXR showed mild bibasilar atelectasis/scar, no pleural effusion, normal pulmonary vascularity. O2 sats 90's on RA on admit.  - Continue prednisone 10 mg daily through 8/8/2024; then 5 mg daily starting 8/9/2024 for 60 days.  - Continue sulfamethoxazole-trimethoprim prophylaxis - monitor renal function closely, may need to hold if cr worsens.  - Follow-up with Pulmonology (pending PFTs and repeat CT for 8/2024).    Exertional dyspnea, suspect multifactorial including CHF, recent pneumonitis.  - Continue to treat other issues as noted.    BOWEN with variable compliance secondary to frequent nocturia.  - Continue CPAP with 3L O2 while sleeping.    ROBIN on CKD likely stage III.  * Baseline creatinine 1.5.  * Cr 1.56 on admit. Started on diuresis as above.  Went up to 2.09 but improved again at 1.85 on 7/27.  Went up again to 2.03 on 7/28.  Holding off on Diuril on 7/28, continue on Bumex drip.  7/29-creatinine improved.  Continue Bumex drip.  Recent Labs   Lab 07/29/24  0552 07/28/24  0604 07/27/24  0627 07/26/24  2345 07/26/24  1408 07/26/24  1148   CR 1.58* 2.03* 1.85* 2.09* 1.67* 1.69*   Estimated Creatinine Clearance: 57.3 mL/min (A) (based on SCr of 1.58 mg/dL (H)).  - Continue to monitor BMP  - Avoid nephrotoxic medications.    Hypokalemia and hypomagnesemia.  * Continued on scheduled potassium on admit.  Recent Labs   Lab 07/29/24  0552 07/28/24  1257 07/28/24  0604 07/27/24  1611 07/27/24  0627 07/26/24  2345 07/26/24  1148 07/26/24  0707 07/25/24  1048 07/24/24  1507 07/24/24  1141   POTASSIUM 3.1* 3.8 3.3* 3.8 3.3* 3.7   < > 2.8*   < > 3.8 3.5   MAG 1.9  --  1.9  --  1.9  --   --  1.6*  --  2.0 1.6*   PHOS 3.8  --  4.5  --  4.0  --   --  4.1  --   --   --     < > = values in this interval not displayed.   - Continue scheduled potassium chloride 60 meq BID for now; monitor closely with ROBIN/CKD.  - Continue  "PRN K and Mg replacement - monitor K levels closely with ROBIN/CKD.    Alcohol use disorder with intermittent spells of sobriety.  * On admit, noted that pt had a few seltzer drinks the past week PTA.  - Cessation encouraged on admit.    Bell's palsy with asymmetric smile.  - Noted.    Lung nodules  * Follows with outpatient Pulmonology.  - Follow-up outpatient.    BPH.  - Continue PTA finasteride.    RLE wound.  * Noted on admit. WOC RN consulted.  - Continue local wound cares per WOC RN.    Severe obesity.  * Body mass index is 45.86 kg/m .  - Needs to continue to pursue aggressive dietary and lifestyle modifications.      Clinically Significant Risk Factors        # Hypokalemia: Lowest K = 3.1 mmol/L in last 2 days, will replace as needed             # Hypertension: Noted on problem list  # Acute heart failure with preserved ejection fraction: heart failure noted on problem list, last echo with EF >50%, and receiving IV diuretics           # Severe Obesity: Estimated body mass index is 45.86 kg/m  as calculated from the following:    Height as of an earlier encounter on 7/25/24: 1.727 m (5' 8\").    Weight as of this encounter: 136.8 kg (301 lb 9.6 oz)., PRESENT ON ADMISSION       # Financial/Environmental Concerns: none         Diet: Fluid restriction 2000 ML FLUID  Combination Diet Regular Diet Adult; Moderate Consistent Carb (60 g CHO per Meal) Diet; 2 gm NA Diet; Low Saturated Fat Diet    Prophylaxis: PCD's, ambulation, and is on DOAC.  Palmer Catheter: Not present  Lines: None     Code Status: Full Code    Disposition Plan   Medically Ready for Discharge: Anticipated in 2-4 Days  Expected discharge to home pending stability/improvement/resolution of above acute issues.    Entered: Masha Moy MD 07/29/2024, 3:01 PM     Communication.      TIME SPENT: I spent approximately 45 minutes bedside and on the inpatient unit today managing the care of patient. Over 50% of my time on the unit was spent in extensive " chart review, counseling the patient/family and/or coordinating care regarding services listed in this note.        Interval History   Stable overnight.  Weight slowly coming down, currently about net -11 L.  No pain or shortness of breath.    * Data reviewed today: I reviewed all new labs and imaging over the last 24 hours. I personally reviewed labs    Physical Exam   Most recent vitals:   , Blood pressure 125/83, pulse 92, temperature 98.1  F (36.7  C), temperature source Oral, resp. rate 18, weight 136.8 kg (301 lb 9.6 oz), SpO2 92%. O2 Device: None (Room air)    Vitals:    07/27/24 0453 07/28/24 0558 07/29/24 0400   Weight: 138.8 kg (306 lb) 138.1 kg (304 lb 6.4 oz) 136.8 kg (301 lb 9.6 oz)     Vital signs with ranges:  Temp:  [97.6  F (36.4  C)-98.1  F (36.7  C)] 98.1  F (36.7  C)  Pulse:  [] 92  Resp:  [16-18] 18  BP: (108-125)/() 125/83  SpO2:  [92 %-94 %] 92 %  Patient Vitals for the past 24 hrs:   BP Temp Temp src Pulse Resp SpO2 Weight   07/29/24 1300 -- 98.1  F (36.7  C) Oral 92 18 -- --   07/29/24 0738 125/83 97.7  F (36.5  C) Oral 90 16 92 % --   07/29/24 0400 -- -- -- -- -- -- 136.8 kg (301 lb 9.6 oz)   07/29/24 0000 119/80 97.6  F (36.4  C) Oral -- 16 94 % --   07/28/24 2103 (!) 116/105 -- -- 97 -- -- --   07/28/24 1923 122/79 97.9  F (36.6  C) Oral 100 16 93 % --   07/28/24 1626 (!) 108/92 97.8  F (36.6  C) Oral 83 18 92 % --     I/O's last 24 hours:  I/O last 3 completed shifts:  In: 775 [P.O.:775]  Out: 3900 [Urine:3900]    Constitutional: awake, alert, oriented, pleasant, conversant, sitting up in chair  Cardiovascular: Irregular, systolic murmur noted, bilateral lower extremity 2+ pitting edema noted  Lungs: diminished in the bases, no crackles or wheezes  Gastrointestinal/Abdomen: soft, non-tender, non-distended, positive bowel sounds  Skin: bilateral lower extremities with 2+ pitting edema  Neurologic: Alert and conversing appropriately, moving all extremities, fluent  "speech  Psychiatric: Calm and pleasant       Data    Labs reviewed.  Recent Labs   Lab 07/29/24  0552 07/28/24  1257 07/28/24  0604 07/27/24  1611 07/27/24  0627 07/26/24  1148 07/26/24  0707   WBC  --   --   --   --  9.8  --  10.5   HGB  --   --   --   --  13.7  --  13.5   MCV  --   --   --   --  98  --  99   PLT  --   --   --   --  231  --  215     --  136  --  136   < > 135   POTASSIUM 3.1* 3.8 3.3*   < > 3.3*   < > 2.8*   CHLORIDE 93*  --  93*  --  94*   < > 92*   CO2 34*  --  33*  --  32*   < > 32*   BUN 42.7*  --  51.1*  --  46.0*   < > 39.3*   CR 1.58*  --  2.03*  --  1.85*   < > 1.76*   ANIONGAP 10  --  10  --  10   < > 11   ASHELY 9.1  --  9.0  --  8.8   < > 8.9   *  --  123*  --  120*   < > 119*    < > = values in this interval not displayed.     Recent Labs   Lab Test 05/05/24  1515 05/05/24  1003 02/11/24  0848 02/11/24  0220 11/28/23  0623   NT-PROBNP, INPATIENT  --  1,202*  --  442 160   TROPONIN T HIGH SENSITIVITY 85* 90* 59* 57*  --      Recent Labs   Lab 07/29/24  0552 07/28/24  0604 07/27/24  0627 07/26/24  2345 07/26/24  1408 07/26/24  1148   * 123* 120* 124* 185* 141*     Recent Labs   Lab Test 02/25/19  0756 10/12/17  0814   A1C 5.7* 5.7       No results for input(s): \"INR\", \"[id number]\" in the last 168 hours.  Recent Labs   Lab 07/27/24  0627 07/26/24  0707   WBC 9.8 10.5       MICRO:  CULTURES (INCLUDING BLOOD AND URINE):  No lab results found in last 7 days.    No results found for this or any previous visit (from the past 24 hour(s)).      Medications   All medications were reviewed. MAR.    Infusions:  Current Facility-Administered Medications   Medication Dose Route Frequency Provider Last Rate Last Admin    - MEDICATION INSTRUCTIONS -   Does not apply DOES NOT GO TO Courtney Blackmon APRN CNP        bumetanide (BUMEX) 0.25 mg/mL infusion  0.5 mg/hr Intravenous Continuous Courtney Roy APRN CNP 2 mL/hr at 07/29/24 0840 0.5 mg/hr at 07/29/24 0840    Continuing ACE " inhibitor/ARB/ARNI from home medication list OR ACE inhibitor/ARB/ARNI order already placed during this visit   Does not apply DOES NOT GO TO Courtney Blackmon APRN CNP        Continuing beta blocker from home medication list OR beta blocker order already placed during this visit   Does not apply DOES NOT GO TO Courtney Blackmon APRN CNP        Patient is already receiving anticoagulation with heparin, enoxaparin (LOVENOX), warfarin (COUMADIN)  or other anticoagulant medication   Does not apply Continuous PRN Courtney Roy APRN CNP         Scheduled Medications:  Current Facility-Administered Medications   Medication Dose Route Frequency Provider Last Rate Last Admin    allopurinol (ZYLOPRIM) tablet 300 mg  300 mg Oral Daily Courtney Roy APRN CNP   300 mg at 07/29/24 0831    cetirizine (zyrTEC) tablet 10 mg  10 mg Oral QPM Courtney Roy APRN CNP   10 mg at 07/28/24 2103    finasteride (PROPECIA) tablet 1 mg  1 mg Oral Daily Courtney Roy APRN CNP   1 mg at 07/29/24 0837    [Held by provider] losartan (COZAAR) tablet 50 mg  50 mg Oral Daily Courtney Roy APRN CNP   50 mg at 07/27/24 0829    metoprolol tartrate (LOPRESSOR) half-tab 12.5 mg  12.5 mg Oral BID Courtney Roy APRN CNP   12.5 mg at 07/29/24 0830    polyethylene glycol (MIRALAX) Packet 17 g  17 g Oral Daily Courtney Roy APRN CNP   17 g at 07/29/24 0834    potassium chloride pushpa ER (KLOR-CON M20) CR tablet 60 mEq  60 mEq Oral BID Courtney Roy APRN CNP   60 mEq at 07/29/24 1022    predniSONE (DELTASONE) tablet 10 mg  10 mg Oral Daily Courtney Roy APRN CNP   10 mg at 07/29/24 0831    Followed by    [START ON 8/9/2024] predniSONE (DELTASONE) tablet 5 mg  5 mg Oral Daily Courtney Roy APRN CNP        psyllium (METAMUCIL/KONSYL) capsule 1-2 capsule  1-2 capsule Oral Daily Courtney Roy, APRN CNP   2 capsule at 07/29/24 0837    rivaroxaban ANTICOAGULANT (XARELTO) tablet 20 mg  20 mg Oral Daily with supper Courtney Roy,  APRN CNP   20 mg at 07/28/24 1633    sodium chloride (PF) 0.9% PF flush 3 mL  3 mL Intracatheter Q8H Courtney Roy APRN CNP   3 mL at 07/27/24 1050    [Held by provider] spironolactone (ALDACTONE) tablet 25 mg  25 mg Oral Daily Courtney Roy APRN CNP        sulfamethoxazole-trimethoprim (BACTRIM DS) 800-160 MG per tablet 1 tablet  1 tablet Oral Daily Courtney Roy APRN CNP   1 tablet at 07/29/24 0831    [Held by provider] torsemide (DEMADEX) tablet 80 mg  80 mg Oral Daily Courtney Roy APRN CNP         PRN Medications:  Current Facility-Administered Medications   Medication Dose Route Frequency Provider Last Rate Last Admin    - MEDICATION INSTRUCTIONS -   Does not apply DOES NOT GO TO Courtney Blackmon APRN CNP        acetaminophen (TYLENOL) tablet 650 mg  650 mg Oral Q4H PRN Courtney Roy APRN CNP        Or    acetaminophen (TYLENOL) Suppository 650 mg  650 mg Rectal Q4H PRN Courtney Roy APRN CNP        calcium carbonate (TUMS) chewable tablet 1,000 mg  1,000 mg Oral 4x Daily PRN Courtney Roy APRN CNP        Continuing ACE inhibitor/ARB/ARNI from home medication list OR ACE inhibitor/ARB/ARNI order already placed during this visit   Does not apply DOES NOT GO TO Courtney Blackmon APRN CNP        Continuing beta blocker from home medication list OR beta blocker order already placed during this visit   Does not apply DOES NOT GO TO Courtney Blackmon APRN CNP        lidocaine (LMX4) cream   Topical Q1H PRN Courtney Roy APRN CNP        lidocaine 1 % 0.1-1 mL  0.1-1 mL Other Q1H PRN Courtney Roy APRN CNP        melatonin tablet 1 mg  1 mg Oral At Bedtime PRN Courtney Roy APRN CNP        ondansetron (ZOFRAN ODT) ODT tab 4 mg  4 mg Oral Q6H PRN Courtney Roy APRN CNP        Or    ondansetron (ZOFRAN) injection 4 mg  4 mg Intravenous Q6H PRN Courtney Royy, APRN CNP        Patient is already receiving anticoagulation with heparin, enoxaparin (LOVENOX), warfarin (COUMADIN)   or other anticoagulant medication   Does not apply Continuous PRN Courtney Roy APRN CNP        prochlorperazine (COMPAZINE) injection 5 mg  5 mg Intravenous Q6H PRN Courtney Roy APRN CNP        Or    prochlorperazine (COMPAZINE) tablet 5 mg  5 mg Oral Q6H PRN Courtney Roy APRN CNP        Or    prochlorperazine (COMPAZINE) suppository 12.5 mg  12.5 mg Rectal Q12H PRN Courtney Roy APRN CNP        senna-docusate (SENOKOT-S/PERICOLACE) 8.6-50 MG per tablet 1 tablet  1 tablet Oral BID PRN Courtney Roy APRN CNP        Or    senna-docusate (SENOKOT-S/PERICOLACE) 8.6-50 MG per tablet 2 tablet  2 tablet Oral BID PRN Courtney Roy APRN CNP        sodium chloride (PF) 0.9% PF flush 3 mL  3 mL Intracatheter q1 min prn Courtney Roy APRN CNP

## 2024-07-30 ENCOUNTER — APPOINTMENT (OUTPATIENT)
Dept: OCCUPATIONAL THERAPY | Facility: CLINIC | Age: 74
DRG: 286 | End: 2024-07-30
Attending: PHYSICIAN ASSISTANT
Payer: COMMERCIAL

## 2024-07-30 LAB
ANION GAP SERPL CALCULATED.3IONS-SCNC: 9 MMOL/L (ref 7–15)
ATRIAL RATE - MUSE: 394 BPM
ATRIAL RATE - MUSE: 61 BPM
BUN SERPL-MCNC: 38.2 MG/DL (ref 8–23)
CALCIUM SERPL-MCNC: 9.1 MG/DL (ref 8.8–10.4)
CHLORIDE SERPL-SCNC: 95 MMOL/L (ref 98–107)
CREAT SERPL-MCNC: 1.63 MG/DL (ref 0.67–1.17)
DIASTOLIC BLOOD PRESSURE - MUSE: NORMAL MMHG
DIASTOLIC BLOOD PRESSURE - MUSE: NORMAL MMHG
EGFRCR SERPLBLD CKD-EPI 2021: 44 ML/MIN/1.73M2
GLUCOSE SERPL-MCNC: 113 MG/DL (ref 70–99)
HCO3 SERPL-SCNC: 34 MMOL/L (ref 22–29)
HOLD SPECIMEN: NORMAL
INTERPRETATION ECG - MUSE: NORMAL
INTERPRETATION ECG - MUSE: NORMAL
MAGNESIUM SERPL-MCNC: 1.9 MG/DL (ref 1.7–2.3)
P AXIS - MUSE: NORMAL DEGREES
P AXIS - MUSE: NORMAL DEGREES
PHOSPHATE SERPL-MCNC: 3.8 MG/DL (ref 2.5–4.5)
POTASSIUM SERPL-SCNC: 3.1 MMOL/L (ref 3.4–5.3)
POTASSIUM SERPL-SCNC: 3.8 MMOL/L (ref 3.4–5.3)
POTASSIUM SERPL-SCNC: 4.3 MMOL/L (ref 3.4–5.3)
PR INTERVAL - MUSE: NORMAL MS
PR INTERVAL - MUSE: NORMAL MS
QRS DURATION - MUSE: 80 MS
QRS DURATION - MUSE: 84 MS
QT - MUSE: 420 MS
QT - MUSE: 440 MS
QTC - MUSE: 511 MS
QTC - MUSE: 519 MS
R AXIS - MUSE: 46 DEGREES
R AXIS - MUSE: 46 DEGREES
SODIUM SERPL-SCNC: 138 MMOL/L (ref 135–145)
SYSTOLIC BLOOD PRESSURE - MUSE: NORMAL MMHG
SYSTOLIC BLOOD PRESSURE - MUSE: NORMAL MMHG
T AXIS - MUSE: 76 DEGREES
T AXIS - MUSE: 85 DEGREES
VENTRICULAR RATE- MUSE: 84 BPM
VENTRICULAR RATE- MUSE: 89 BPM

## 2024-07-30 PROCEDURE — 210N000002 HC R&B HEART CARE

## 2024-07-30 PROCEDURE — 97535 SELF CARE MNGMENT TRAINING: CPT | Mod: GO | Performed by: OCCUPATIONAL THERAPIST

## 2024-07-30 PROCEDURE — 250N000009 HC RX 250: Performed by: NURSE PRACTITIONER

## 2024-07-30 PROCEDURE — 99233 SBSQ HOSP IP/OBS HIGH 50: CPT | Performed by: HOSPITALIST

## 2024-07-30 PROCEDURE — 93005 ELECTROCARDIOGRAM TRACING: CPT

## 2024-07-30 PROCEDURE — 83735 ASSAY OF MAGNESIUM: CPT | Performed by: HOSPITALIST

## 2024-07-30 PROCEDURE — 97110 THERAPEUTIC EXERCISES: CPT | Mod: GO | Performed by: OCCUPATIONAL THERAPIST

## 2024-07-30 PROCEDURE — 250N000012 HC RX MED GY IP 250 OP 636 PS 637: Performed by: NURSE PRACTITIONER

## 2024-07-30 PROCEDURE — 250N000013 HC RX MED GY IP 250 OP 250 PS 637: Performed by: NURSE PRACTITIONER

## 2024-07-30 PROCEDURE — 93010 ELECTROCARDIOGRAM REPORT: CPT | Performed by: INTERNAL MEDICINE

## 2024-07-30 PROCEDURE — 84132 ASSAY OF SERUM POTASSIUM: CPT | Performed by: HOSPITALIST

## 2024-07-30 PROCEDURE — 250N000013 HC RX MED GY IP 250 OP 250 PS 637: Performed by: PHYSICIAN ASSISTANT

## 2024-07-30 PROCEDURE — 99233 SBSQ HOSP IP/OBS HIGH 50: CPT | Mod: FS | Performed by: PHYSICIAN ASSISTANT

## 2024-07-30 PROCEDURE — 250N000013 HC RX MED GY IP 250 OP 250 PS 637: Performed by: HOSPITALIST

## 2024-07-30 PROCEDURE — 80048 BASIC METABOLIC PNL TOTAL CA: CPT | Performed by: HOSPITALIST

## 2024-07-30 PROCEDURE — 36415 COLL VENOUS BLD VENIPUNCTURE: CPT | Performed by: HOSPITALIST

## 2024-07-30 PROCEDURE — 84100 ASSAY OF PHOSPHORUS: CPT | Performed by: HOSPITALIST

## 2024-07-30 RX ORDER — POTASSIUM CHLORIDE 1500 MG/1
60 TABLET, EXTENDED RELEASE ORAL 3 TIMES DAILY
Status: DISCONTINUED | OUTPATIENT
Start: 2024-07-30 | End: 2024-08-05

## 2024-07-30 RX ORDER — POTASSIUM CHLORIDE 1500 MG/1
40 TABLET, EXTENDED RELEASE ORAL ONCE
Status: COMPLETED | OUTPATIENT
Start: 2024-07-30 | End: 2024-07-30

## 2024-07-30 RX ORDER — SOTALOL HYDROCHLORIDE 120 MG/1
120 TABLET ORAL EVERY 12 HOURS SCHEDULED
Status: DISCONTINUED | OUTPATIENT
Start: 2024-07-30 | End: 2024-07-31

## 2024-07-30 RX ORDER — MAGNESIUM OXIDE 400 MG/1
400 TABLET ORAL EVERY 4 HOURS
Status: DISPENSED | OUTPATIENT
Start: 2024-07-30 | End: 2024-07-30

## 2024-07-30 RX ADMIN — ALLOPURINOL 300 MG: 300 TABLET ORAL at 08:47

## 2024-07-30 RX ADMIN — POTASSIUM CHLORIDE 60 MEQ: 1500 TABLET, EXTENDED RELEASE ORAL at 08:46

## 2024-07-30 RX ADMIN — POTASSIUM CHLORIDE 60 MEQ: 1500 TABLET, EXTENDED RELEASE ORAL at 22:18

## 2024-07-30 RX ADMIN — SULFAMETHOXAZOLE AND TRIMETHOPRIM 1 TABLET: 800; 160 TABLET ORAL at 08:47

## 2024-07-30 RX ADMIN — CETIRIZINE HYDROCHLORIDE 10 MG: 10 TABLET, FILM COATED ORAL at 22:18

## 2024-07-30 RX ADMIN — Medication 2 CAPSULE: at 08:49

## 2024-07-30 RX ADMIN — POTASSIUM CHLORIDE 60 MEQ: 1500 TABLET, EXTENDED RELEASE ORAL at 16:30

## 2024-07-30 RX ADMIN — RIVAROXABAN 20 MG: 20 TABLET, FILM COATED ORAL at 16:32

## 2024-07-30 RX ADMIN — Medication 400 MG: at 10:58

## 2024-07-30 RX ADMIN — PREDNISONE 10 MG: 10 TABLET ORAL at 08:47

## 2024-07-30 RX ADMIN — BUMETANIDE 0.5 MG/HR: 0.25 INJECTION INTRAMUSCULAR; INTRAVENOUS at 07:58

## 2024-07-30 RX ADMIN — SOTALOL HYDROCHLORIDE 120 MG: 120 TABLET ORAL at 10:58

## 2024-07-30 RX ADMIN — POLYETHYLENE GLYCOL 3350 17 G: 17 POWDER, FOR SOLUTION ORAL at 08:48

## 2024-07-30 RX ADMIN — FINASTERIDE 1 MG: 1 TABLET, FILM COATED ORAL at 08:48

## 2024-07-30 RX ADMIN — POTASSIUM CHLORIDE 40 MEQ: 1500 TABLET, EXTENDED RELEASE ORAL at 10:58

## 2024-07-30 RX ADMIN — Medication 12.5 MG: at 08:46

## 2024-07-30 RX ADMIN — SOTALOL HYDROCHLORIDE 120 MG: 120 TABLET ORAL at 22:18

## 2024-07-30 ASSESSMENT — ACTIVITIES OF DAILY LIVING (ADL)
ADLS_ACUITY_SCORE: 28
ADLS_ACUITY_SCORE: 24
ADLS_ACUITY_SCORE: 28
ADLS_ACUITY_SCORE: 24
ADLS_ACUITY_SCORE: 24
ADLS_ACUITY_SCORE: 28
ADLS_ACUITY_SCORE: 28
ADLS_ACUITY_SCORE: 24
ADLS_ACUITY_SCORE: 24
ADLS_ACUITY_SCORE: 28
ADLS_ACUITY_SCORE: 24
ADLS_ACUITY_SCORE: 28
ADLS_ACUITY_SCORE: 24
ADLS_ACUITY_SCORE: 28
ADLS_ACUITY_SCORE: 24

## 2024-07-30 NOTE — CONSULTS
[facility]  Electrophysiology Consultation     Date of Admission:  7/25/2024  Primary Cardiologist: Dr. Day  Date of Consult (When I saw the patient): 07/30/24  Reason for consult: Persistent atrial fibrillation      Assessment:  Persistent atrial fibrillation  Initially diagnosed in 2012 and failed flecainide. S/p PVI and SVC isolation in 2017, repeat PVI/CTI in 2019.   Recurrent AF in the setting of EtOH use. Intermitted use of amiodarone with recurrent AF with history of multiple DCCVs  Amiodarone was stopped in 11/2023 due to amio pneumonitis with recurrent AF off amiodarone. Then underwent repeat ablation in 5/2024 (re-isolation of PVI, empiric CTI).   Hospitalized in 5/2024 following ablation for acute respiratory failure not responding to diuresis and pulmonology restarted high dose steroid therapy due to relapse of inflammatory process.   [id number] Score 4 (age, CHF, HTN, aortic plaque) anticoagulated on Xarelto  Estimated Creatinine Clearance: 55.3 mL/min (A) (based on SCr of 1.63 mg/dL (H)).    Acute on chronic HFpEF exacerbation  EDW: ~250 lbs with weight today 300 lbs, net negative ~13 l  Diuresis per general cardiology team with IV bumex.     ROBIN on CKD, stage III  Baseline creatine 1.5 and increased to 2.09 with diuresis.   Creatine 1.63 7/30  Estimated Creatinine Clearance: 55.3 mL/min (A) (based on SCr of 1.63 mg/dL (H)).    History of abnormal nuclear stress test during admission 5/2024   Lexiscan stress test 5/2024: Small area of ischemia in the apical segments of the LV, small area of nontransmural infarction  in the basal inferolateral segments of the left ventricle associated with a mild degree of iva-infarct ischemia involving basal to mid inferior wall.  Possibly a false positive due to body habitus.   Recommended RHC/LHC when pulmonary/renal status improves    Moderate aortic stenosis    Amiodarone pneumonitis    HTN    Myocardial scar on cardiac MRI    Plan:    Start sotalol 120 mg BID today  EKG 1 hour post first dose and then daily thereafter.   Keep telemetry on at all times  Discussed with general cardiology team and likely will defer LHC and obtain RHC to assess volumes status.  NPO after midnight for possible DCCV/RHC tomorrow.    --------------------------------------------------------------------------------------------  Svetlana Cho, APRN CNP  Pager:  [phone]  EP service:  IDRIS ([phone])     A total of 60 minutes was spent face-to-face or coordinating care of Mike Schultz. Over 50% of our time was spent counseling the patient and/or coordinating care.    History of Present Illness   Mr. Mike Schultz is a very pleasant 73 year old male with a past medical history of persistent atrial fibrillation, HFpEF, moderate aortic stenosis, HTN, BOWEN on CPAP, EtOH abuse, Bell's palsy, lung nodules who was admitted from clinic due to HFpEF exacerbation on 7/25/2024.     The day prior to admission he attended outpatient infusion clinic and was given Bumex 2 mg IVP, then 0.5 mg/h x4 h and diuril 250 mg a few hours later. His torsemide was increased to 80 mg daily and KCl was increased to 60 mEq BID. At his OV the day of admission he remained 30 lbs above EDW with DELEON, lower extremity edema. Therefore the decision for admission was made.     CrCl greater than 60 mL/min: 500 mcg orally twice a day. CrCl 40 to 60 mL/min: 250 mcg orally twice a day. CrCl 20 to less than 40 mL/min: 125 mcg orally twice a day.    Code Status    Full Code    Temp:  [97.5  F (36.4  C)-98.4  F (36.9  C)] 98.4  F (36.9  C)  Pulse:  [85-96] 95  Resp:  [16-18] 18  BP: (123-144)/(82-98) 132/97  SpO2:  [92 %-96 %] 93 %  300 lbs 0 oz    Data   Recent Labs   Lab Test 07/27/24  0627 07/26/24  0707 11/27/23  0648 11/26/23  0455 11/25/23  2302   WBC 9.8 10.5   < >  --   --    HGB 13.7 13.5   < >  --   --    MCV 98 99   < >  --   --     215   < >  --   --    INR  --   --   --  2.10* 2.62*  "   < > = values in this interval not displayed.      Recent Labs   Lab Test 07/29/24  1457 07/29/24  0552 07/28/24  1257 07/28/24  0604   NA  --  137  --  136   POTASSIUM 4.0 3.1*   < > 3.3*   CHLORIDE  --  93*  --  93*   BUN  --  42.7*  --  51.1*   CR  --  1.58*  --  2.03*    < > = values in this interval not displayed.     Recent Labs   Lab 07/29/24  0552 07/28/24  0604 07/27/24  0627 07/26/24  2345   * 123* 120* 124*     Recent Labs   Lab Test 05/05/24  1003 02/11/24  0220   ALT 40 37   AST 34 28     No results found for: \"TROPI\"    Recent Labs   Lab Test 07/29/24  0552 07/28/24  0604 07/27/24  0627   MAG 1.9 1.9 1.9       Lab Results   Component Value Date    CHOL 163 07/27/2023    CHOL 159 06/30/2021     Lab Results   Component Value Date    HDL 39 07/27/2023    HDL 44 06/30/2021     Lab Results   Component Value Date    LDL 97 07/27/2023    LDL 91 06/30/2021     Lab Results   Component Value Date    TRIG 133 07/27/2023    TRIG 121 06/30/2021     Lab Results   Component Value Date    CHOLHDLRATIO 3.8 03/16/2015          TSH   Date Value Ref Range Status   11/24/2023 2.67 0.30 - 4.20 uIU/mL Final   10/18/2022 4.33 (H) 0.40 - 4.00 mU/L Final   06/30/2021 2.22 0.40 - 4.00 mU/L Final       AYAN Thorpe CNP     Primary Care Physician   Ranjan Courtney    Past Medical History   Past Medical History:   Diagnosis Date    (HFpEF) heart failure with preserved ejection fraction (H)     Acute gouty arthritis     Alcohol use disorder in remission     Amiodarone pulmonary toxicity     Aortic stenosis     Basal cell carcinoma     Of the nose    Bell's palsy     Herpes simplex without mention of complication     Hyperlipidemia     Hypertension 2000    Persistent atrial fibrillation (H)     persistent, DCCV 12/2017, ablation 11/6/17    Pulmonary nodules     Sleep apnea     CPAP       Past Surgical History   Past Surgical History:   Procedure Laterality Date    ABLATION OF DYSRHYTHMIC FOCUS  2018, 04/12/2019    " Procedure: EP Ablation Focal AFIB;  Surgeon: Fady Day MD;  Location:  [facility]    ACHILLES TENDON SURGERY  1997    ANESTHESIA CARDIOVERSION N/A 2/26/2019    Procedure: ANESTHESIA CARDIOVERSION (CONNER);  Surgeon: GENERIC ANESTHESIA PROVIDER;  Location:  OR    ANESTHESIA CARDIOVERSION N/A 11/19/2020    Procedure: ANESTHESIA, FOR CARDIOVERSION (dr campbell);  Surgeon: GENERIC ANESTHESIA PROVIDER;  Location:  OR    ANESTHESIA CARDIOVERSION N/A 8/22/2022    Procedure: CARDIOVERSION;  Surgeon: GENERIC ANESTHESIA PROVIDER;  Location:  OR    ANESTHESIA CARDIOVERSION N/A 9/13/2023    Procedure: Anesthesia cardioversion;  Surgeon: GENERIC ANESTHESIA PROVIDER;  Location:  OR    ANESTHESIA CARDIOVERSION N/A 10/6/2023    Procedure: Anesthesia cardioversion;  Surgeon: GENERIC ANESTHESIA PROVIDER;  Location:  OR    BASAL CELL CARCINOMA EXCISION Right 2009    nose, took cartilage from inner ear.    CARDIOVERSION  2012, 2019    cardioversion for atr fib    COLONOSCOPY  2008    COLONOSCOPY N/A 3/22/2023    Procedure: COLONOSCOPY, FLEXIBLE, WITH LESION REMOVAL USING SNARE;  Surgeon: Anne Lucero MD;  Location:  GI    EP ABLATION FOCAL AFIB N/A 4/12/2019    Procedure: EP Ablation Focal AFIB;  Surgeon: Fady Day MD;  Location:  [facility]    EP ABLATION FOCAL AFIB N/A 5/2/2024    Procedure: Ablation Atrial Fibrilation;  Surgeon: Fady Day MD;  Location:  [facility]    HERNIA REPAIR, UMBILICAL  08/20/2012    Procedure: HERNIORRHAPHY UMBILICAL;  UMBILICAL HERNIA REPAIR;  Surgeon: Ra Crocker MD;  Location: [facility]    HERNIORRHAPHY UMBILICAL  8/20/2012    Procedure: HERNIORRHAPHY UMBILICAL;  UMBILICAL HERNIA REPAIR;  Surgeon: Ra Crocker MD;  Location: [facility]    ORTHOPEDIC SURGERY      ORTHOPEDIC SURGERY      OTHER SURGICAL HISTORY Right 03/2017    total KNEE ARTHROSCOPY     TOTAL KNEE ARTHROPLASTY Left 03/16/2018    ZZC NONSPECIFIC PROCEDURE  1997     achilles tendon    [facility] NONSPECIFIC PROCEDURE      knees, arthroscopy    [facility] NONSPECIFIC PROCEDURE      basal cell skin ca, nose    [facility] NONSPECIFIC PROCEDURE      flex sig; colonoscopy due 3/2008    [facility] NONSPECIFIC PROCEDURE  2012    cardioversion for atr fib    [facility] TOTAL KNEE ARTHROPLASTY Left 03/16/2018    Dr. Malick Suarez       Prior to Admission Medications   Prior to Admission Medications   Prescriptions Last Dose Informant Patient Reported? Taking?   allopurinol (ZYLOPRIM) 300 MG tablet 7/25/2024  No Yes   Sig: Take 1 tablet (300 mg) by mouth daily   cetirizine (ZYRTEC) 10 MG tablet 7/24/2024  Yes Yes   Sig: Take 10 mg by mouth every evening   finasteride (PROPECIA) 1 MG tablet 7/25/2024  No Yes   Sig: Take 1 tablet (1 mg) by mouth daily   losartan (COZAAR) 100 MG tablet 7/25/2024  Yes Yes   Sig: Take 50 mg by mouth daily   metoprolol tartrate (LOPRESSOR) 25 MG tablet 7/25/2024 at am x1  No Yes   Sig: Take 0.5 tablets (12.5 mg) by mouth 2 times daily   potassium chloride pushpa ER (KLOR-CON M20) 20 MEQ CR tablet 7/25/2024 at am x 40 meq  No Yes   Sig: Take 3 tablets (60 mEq) by mouth 2 times daily   predniSONE (DELTASONE) 5 MG tablet 7/25/2024 at 10 mg  No Yes   Sig: Take 8 tablets (40 mg) by mouth daily for 10 days, THEN 4 tablets (20 mg) daily for 30 days, THEN 2 tablets (10 mg) daily for 45 days, THEN 1 tablet (5 mg) daily for 60 days.   psyllium (METAMUCIL/KONSYL) capsule 7/24/2024  Yes Yes   Sig: Take 1-2 capsules by mouth daily   rivaroxaban ANTICOAGULANT (XARELTO ANTICOAGULANT) 20 MG TABS tablet 7/24/2024 at pm  No Yes   Sig: Take 1 tablet (20 mg) by mouth daily (with dinner)   spironolactone (ALDACTONE) 25 MG tablet 7/25/2024 at am  No Yes   Sig: Take 1 tablet (25 mg) by mouth daily   sulfamethoxazole-trimethoprim (BACTRIM DS) 800-160 MG tablet 7/25/2024  No Yes   Sig: Take 1 tablet by mouth daily while on steroids   torsemide (DEMADEX) 20 MG tablet 7/25/2024 at am  No Yes   Sig: Take 4 tablets (80 mg)  by mouth daily      Facility-Administered Medications: None     Current Facility-Administered Medications   Medication Dose Route Frequency Provider Last Rate Last Admin    - MEDICATION INSTRUCTIONS -   Does not apply DOES NOT GO TO Courtney Blackmon APRN CNP        acetaminophen (TYLENOL) tablet 650 mg  650 mg Oral Q4H PRN Courtney Roy APRN CNP        Or    acetaminophen (TYLENOL) Suppository 650 mg  650 mg Rectal Q4H PRN Courtney Roy APRN CNP        allopurinol (ZYLOPRIM) tablet 300 mg  300 mg Oral Daily Courtney Roy APRN CNP   300 mg at 07/29/24 0831    bumetanide (BUMEX) 0.25 mg/mL infusion  0.5 mg/hr Intravenous Continuous Courtney Roy APRN CNP 2 mL/hr at 07/30/24 0758 0.5 mg/hr at 07/30/24 0758    calcium carbonate (TUMS) chewable tablet 1,000 mg  1,000 mg Oral 4x Daily PRN Courtney Roy APRN CNP        cetirizine (zyrTEC) tablet 10 mg  10 mg Oral QPM Courtney Roy APRN CNP   10 mg at 07/29/24 2146    Continuing ACE inhibitor/ARB/ARNI from home medication list OR ACE inhibitor/ARB/ARNI order already placed during this visit   Does not apply DOES NOT GO TO Courtney Blackmon APRN CNP        Continuing beta blocker from home medication list OR beta blocker order already placed during this visit   Does not apply DOES NOT GO TO Courtney Blackmon APRN CNP        finasteride (PROPECIA) tablet 1 mg  1 mg Oral Daily Courtney Roy APRN CNP   1 mg at 07/29/24 0837    lidocaine (LMX4) cream   Topical Q1H PRN Courtney Roy APRN CNP        lidocaine 1 % 0.1-1 mL  0.1-1 mL Other Q1H PRN Courtney Roy APRN CNP        [Held by provider] losartan (COZAAR) tablet 50 mg  50 mg Oral Daily Courtney Roy APRN CNP   50 mg at 07/27/24 0829    melatonin tablet 1 mg  1 mg Oral At Bedtime PRN Brokl, Courtney Rebeka, APRN CNP        metoprolol tartrate (LOPRESSOR) half-tab 12.5 mg  12.5 mg Oral BID Courtney Roy APRN CNP   12.5 mg at 07/29/24 2148    ondansetron (ZOFRAN ODT) ODT tab 4 mg  4 mg Oral  Q6H PRN Courtney Roy APRN CNP        Or    ondansetron (ZOFRAN) injection 4 mg  4 mg Intravenous Q6H PRN Courtney Roy APRN CNP        Patient is already receiving anticoagulation with heparin, enoxaparin (LOVENOX), warfarin (COUMADIN)  or other anticoagulant medication   Does not apply Continuous PRN Courtney Roy APRN CNP        polyethylene glycol (MIRALAX) Packet 17 g  17 g Oral Daily Courtney Roy APRN CNP   17 g at 07/29/24 0834    potassium chloride pushpa ER (KLOR-CON M20) CR tablet 60 mEq  60 mEq Oral BID Courtney Roy APRN CNP   60 mEq at 07/29/24 2146    predniSONE (DELTASONE) tablet 10 mg  10 mg Oral Daily Courtney Roy APRN CNP   10 mg at 07/29/24 0831    Followed by    [START ON 8/9/2024] predniSONE (DELTASONE) tablet 5 mg  5 mg Oral Daily Courtney Roy APRN CNP        prochlorperazine (COMPAZINE) injection 5 mg  5 mg Intravenous Q6H PRN Courtney Roy APRN CNP        Or    prochlorperazine (COMPAZINE) tablet 5 mg  5 mg Oral Q6H PRN Courtney Roy APRN CNP        Or    prochlorperazine (COMPAZINE) suppository 12.5 mg  12.5 mg Rectal Q12H PRN Courtney Roy APRN CNP        psyllium (METAMUCIL/KONSYL) capsule 1-2 capsule  1-2 capsule Oral Daily Courtney Roy APRN CNP   2 capsule at 07/29/24 0837    rivaroxaban ANTICOAGULANT (XARELTO) tablet 20 mg  20 mg Oral Daily with supper Courtney Roy APRN CNP   20 mg at 07/29/24 1732    senna-docusate (SENOKOT-S/PERICOLACE) 8.6-50 MG per tablet 1 tablet  1 tablet Oral BID PRN Courtney Roy APRN CNP        Or    senna-docusate (SENOKOT-S/PERICOLACE) 8.6-50 MG per tablet 2 tablet  2 tablet Oral BID PRN Courtney Roy APRN CNP        sodium chloride (PF) 0.9% PF flush 3 mL  3 mL Intracatheter Q8H Courtney Roy APRN CNP   3 mL at 07/27/24 1050    sodium chloride (PF) 0.9% PF flush 3 mL  3 mL Intracatheter q1 min prn Courtney Roy APRN CNP        [Held by provider] spironolactone (ALDACTONE) tablet 25 mg  25 mg Oral Daily  Brokl, Courtney Rebeka, APRN CNP        sulfamethoxazole-trimethoprim (BACTRIM DS) 800-160 MG per tablet 1 tablet  1 tablet Oral Daily Courtney Roy APRN CNP   1 tablet at 07/29/24 0831    [Held by provider] torsemide (DEMADEX) tablet 80 mg  80 mg Oral Daily Courtney Roy APRN CNP         Current Facility-Administered Medications   Medication Dose Route Frequency Provider Last Rate Last Admin    - MEDICATION INSTRUCTIONS -   Does not apply DOES NOT GO TO Courtney Blackmon APRN CNP        acetaminophen (TYLENOL) tablet 650 mg  650 mg Oral Q4H PRN Courtney Roy APRN CNP        Or    acetaminophen (TYLENOL) Suppository 650 mg  650 mg Rectal Q4H PRN Courtney Roy APRN CNP        allopurinol (ZYLOPRIM) tablet 300 mg  300 mg Oral Daily Courtney Roy APRN CNP   300 mg at 07/29/24 0831    bumetanide (BUMEX) 0.25 mg/mL infusion  0.5 mg/hr Intravenous Continuous Courtney Roy APRN CNP 2 mL/hr at 07/30/24 0758 0.5 mg/hr at 07/30/24 0758    calcium carbonate (TUMS) chewable tablet 1,000 mg  1,000 mg Oral 4x Daily PRN Courtney Roy APRN CNP        cetirizine (zyrTEC) tablet 10 mg  10 mg Oral QPM Courtney Roy APRN CNP   10 mg at 07/29/24 2146    Continuing ACE inhibitor/ARB/ARNI from home medication list OR ACE inhibitor/ARB/ARNI order already placed during this visit   Does not apply DOES NOT GO TO Courtney Blackmon APRN CNP        Continuing beta blocker from home medication list OR beta blocker order already placed during this visit   Does not apply DOES NOT GO TO oCurtney Blackmon APRN CNP        finasteride (PROPECIA) tablet 1 mg  1 mg Oral Daily Courtney Roy APRN CNP   1 mg at 07/29/24 0837    lidocaine (LMX4) cream   Topical Q1H PRN Courtney Roy APRN CNP        lidocaine 1 % 0.1-1 mL  0.1-1 mL Other Q1H PRN Courtney Roy APRN CNP        [Held by provider] losartan (COZAAR) tablet 50 mg  50 mg Oral Daily Courtney Roy APRN CNP   50 mg at 07/27/24 0829    melatonin tablet 1 mg  1 mg  Oral At Bedtime PRN Courtney Roy APRN CNP        metoprolol tartrate (LOPRESSOR) half-tab 12.5 mg  12.5 mg Oral BID Courtney Roy APRN CNP   12.5 mg at 07/29/24 2146    ondansetron (ZOFRAN ODT) ODT tab 4 mg  4 mg Oral Q6H PRN Courtney Roy APRN CNP        Or    ondansetron (ZOFRAN) injection 4 mg  4 mg Intravenous Q6H PRN Courtney Roy APRN CNP        Patient is already receiving anticoagulation with heparin, enoxaparin (LOVENOX), warfarin (COUMADIN)  or other anticoagulant medication   Does not apply Continuous PRN Courtney Roy APRN CNP        polyethylene glycol (MIRALAX) Packet 17 g  17 g Oral Daily Courtney Roy APRN CNP   17 g at 07/29/24 0834    potassium chloride pushpa ER (KLOR-CON M20) CR tablet 60 mEq  60 mEq Oral BID Courtney Roy APRN CNP   60 mEq at 07/29/24 2146    predniSONE (DELTASONE) tablet 10 mg  10 mg Oral Daily Courtney Roy APRN CNP   10 mg at 07/29/24 0831    Followed by    [START ON 8/9/2024] predniSONE (DELTASONE) tablet 5 mg  5 mg Oral Daily Courtney Roy APRN CNP        prochlorperazine (COMPAZINE) injection 5 mg  5 mg Intravenous Q6H PRN Courtney Roy APRN CNP        Or    prochlorperazine (COMPAZINE) tablet 5 mg  5 mg Oral Q6H PRN Courtney Roy APRN CNP        Or    prochlorperazine (COMPAZINE) suppository 12.5 mg  12.5 mg Rectal Q12H PRN Courtney Roy APRN CNP        psyllium (METAMUCIL/KONSYL) capsule 1-2 capsule  1-2 capsule Oral Daily Courtney Roy APRN CNP   2 capsule at 07/29/24 0837    rivaroxaban ANTICOAGULANT (XARELTO) tablet 20 mg  20 mg Oral Daily with supper Courtney Roy APRN CNP   20 mg at 07/29/24 1732    senna-docusate (SENOKOT-S/PERICOLACE) 8.6-50 MG per tablet 1 tablet  1 tablet Oral BID PRN Courtney Roy APRN CNP        Or    senna-docusate (SENOKOT-S/PERICOLACE) 8.6-50 MG per tablet 2 tablet  2 tablet Oral BID PRN Courtney Roy APRN CNP        sodium chloride (PF) 0.9% PF flush 3 mL  3 mL Intracatheter Q8H Courtney Roy  AYAN Staley CNP   3 mL at 24 1050    sodium chloride (PF) 0.9% PF flush 3 mL  3 mL Intracatheter q1 min prn Courtney Roy APRN CNP        [Held by provider] spironolactone (ALDACTONE) tablet 25 mg  25 mg Oral Daily Courtney Roy APRN CNP        sulfamethoxazole-trimethoprim (BACTRIM DS) 800-160 MG per tablet 1 tablet  1 tablet Oral Daily Courtney Roy APRN CNP   1 tablet at 24 0831    [Held by provider] torsemide (DEMADEX) tablet 80 mg  80 mg Oral Daily Courtney Roy APRN CNP         Allergies   Allergies   Allergen Reactions    Amiodarone Shortness Of Breath     Suspected amiodarone toxicity involving lungs       Social History    reports that he quit smoking about 12 years ago. His smoking use included cigarettes. He started smoking about 47 years ago. He has a 34.6 pack-year smoking history. He has been exposed to tobacco smoke. He has never used smokeless tobacco. He reports that he does not currently use alcohol. He reports that he does not use drugs.    Family History   I have reviewed this patient's family history and updated it with pertinent information if needed.  Family History   Problem Relation Age of Onset    Family History Negative Mother     Heart Disease Father         decesased at 42 - MI    Heart Disease Sister          MI    Lipids Sister     Lipids Sister     No Known Problems Sister     Heart Disease Brother          MI    Heart Disease Brother         CABG AT 49    Lung Cancer No family hx of           Review of Systems   A comprehensive review of system was performed and is negative other than that noted in the HPI or here.     Physical exam:     Constitutional:   NAD   Skin:   Warm and dry   Head:   Nontraumatic   HEENT:   Unable to assess   Lungs:   symmetric, clear to auscultation   Cardiovascular:   irregularly irregular rhythm   Abdomen:   Benign   Extremities and Back:   Edema 2+   Neurological:   Grossly nonfocal       No lab results found in last  "7 days.    Invalid input(s): \"TROPONINIES\"    Recent Labs   Lab 07/29/24  1457 07/29/24  0552 07/28/24  1257 07/28/24  0604 07/27/24  1611 07/27/24  0627 07/26/24  1148 07/26/24  0707   WBC  --   --   --   --   --  9.8  --  10.5   HGB  --   --   --   --   --  13.7  --  13.5   MCV  --   --   --   --   --  98  --  99   PLT  --   --   --   --   --  231  --  215   NA  --  137  --  136  --  136   < > 135   POTASSIUM 4.0 3.1* 3.8 3.3*   < > 3.3*   < > 2.8*   CHLORIDE  --  93*  --  93*  --  94*   < > 92*   CO2  --  34*  --  33*  --  32*   < > 32*   BUN  --  42.7*  --  51.1*  --  46.0*   < > 39.3*   CR  --  1.58*  --  2.03*  --  1.85*   < > 1.76*   GFRESTIMATED  --  46*  --  34*  --  38*   < > 41*   ANIONGAP  --  10  --  10  --  10   < > 11   ASHELY  --  9.1  --  9.0  --  8.8   < > 8.9   GLC  --  119*  --  123*  --  120*   < > 119*    < > = values in this interval not displayed.     Recent Labs   Lab Test 07/27/23  0955 10/06/21  1759   CHOL 163 173   HDL 39* 38*   LDL 97 103*   TRIG 133 161*     Recent Labs   Lab 07/27/24  0627 07/26/24  0707   WBC 9.8 10.5   HGB 13.7 13.5   HCT 41.6 40.4   MCV 98 99    215     No results for input(s): \"PH\", \"PHV\", \"PO2\", \"PO2V\", \"SAT\", \"PCO2\", \"PCO2V\", \"HCO3\", \"HCO3V\" in the last 168 hours.  No results for input(s): \"NTBNPI\", \"NTBNP\" in the last 168 hours.  No results for input(s): \"DD\" in the last 168 hours.  No results for input(s): \"SED\", \"CRP\" in the last 168 hours.  Recent Labs   Lab 07/27/24  0627 07/26/24  0707    215     No results for input(s): \"TSH\" in the last 168 hours.  No results for input(s): \"COLOR\", \"APPEARANCE\", \"URINEGLC\", \"URINEBILI\", \"URINEKETONE\", \"SG\", \"UBLD\", \"URINEPH\", \"PROTEIN\", \"UROBILINOGEN\", \"NITRITE\", \"LEUKEST\", \"RBCU\", \"WBCU\" in the last 168 hours.    Imaging:  Recent Results (from the past 48 hour(s))   US Abdomen Limited    Narrative    US ABDOMEN LIMITED 7/29/2024 4:05 PM    CLINICAL HISTORY: rule out ascites  TECHNIQUE: CT abdomen pelvis " "8/3/2023    COMPARISON: None.      Impression    IMPRESSION:    Targeted ultrasound scanning of the 4 abdominal quadrants demonstrates  no ascites.    SEBASTIÁN CHIU MD         SYSTEM ID:  [id number]       Echo:  No results found for this or any previous visit (from the past 4320 hour(s)).    Clinically Significant Risk Factors        # Hypokalemia: Lowest K = 3.1 mmol/L in last 2 days, will replace as needed           # Hypertension: Noted on problem list  # Acute heart failure with preserved ejection fraction: heart failure noted on problem list, last echo with EF >50%, and receiving IV diuretics           # Severe Obesity: Estimated body mass index is 45.61 kg/m  as calculated from the following:    Height as of an earlier encounter on 7/25/24: 1.727 m (5' 8\").    Weight as of this encounter: 136.1 kg (300 lb).      # Financial/Environmental Concerns: none        Cardiac Arrhythmia: Atrial fibrillation: Persistent  Systolic acute    Fluid overload, unspecified    Not present on admission    CKD POA List: Stage 3a (GFR 45-59)    Not present on admission      Not present on admission        "

## 2024-07-30 NOTE — PROGRESS NOTES
[facility] Cardiology Progress Note  Date of Service: 07/30/2024  Primary Cardiologist: Dr. Barb LIMON Antoine is a 73 year old male admitted on 7/25/2024 with acute on chronic HFpEF, likely triggered by recurrent/persistent atrial fibrillation. Diuresis with IV bumex @ 0.5 mg/hr and diuril.     Interim Hx: Pt continues to feel significantly better today. Edema mild. Renal fn fairly stable, but still up from baseline.    Assessment:  Acute HFpEF  -- EDW is ~250# (?) - currently 300 lbs. Net -14L.  Persistent atrial fibrillation with multiple attempts at rhythm control, cardioversions, and ablation attempts, with pneumonitis 2/2 amiodarone  -- [id number] Score 4 (age, CHF, HTN, aortic plaque)  -- Anticoagulated with Xarelto   -- EP to consider AARx/DCCV once fluid status is under better control.   Hypokalemia   ROBIN/CKD   -- Significant improvement from yesterday with holding diuril and continuing bumex  Moderate aortic stenosis   History of abnormal nuclear stress test during admission 5/2024  -- Lexiscan stress test 5/2024- abnormal, small area of ischemia in the apical segments of the left ventricle, small area of nontransmural infarction in the basal inferolateral segments of the left ventricle associated with a mild degree of iva-infarct ischemia involving basal to mid inferior wall   -- This was thought to possibly be a false positive finding due to body habitus, but recommendation was a left and right heart cath when patients pulmonary status returned to baseline    -- No angina.  Amiodarone pneumonitis, on steroids  HTN   BOWEN, compliant with CPAP   Myocardial scar noted on cardiac MRI   History of alcohol dependence     Plan:   Continue bumex. Increase potassium supplementation to 60 meq TID.   RHC tomorrow.   -- Can consider outpatient LH cath per primary cardiologist if warranted, once renal fn improves further.  -- Continue Eliquis; no interruption given need for  upcoming cardioversion. Will OK with interventional cardiology team.   EP consultation placed for consideration of AARx and potential cardioversion.     Plan was formulated under direction of Dr. Harris.   Malika Blanco PA-C  [facility]  Pager: [phone]  Text Page  (7:30am - 4pm M-F)   __________________________________________________________________________  Physical Exam   Temp: 98.4  F (36.9  C) Temp src: Axillary BP: (!) 132/97 Pulse: 95   Resp: 18 SpO2: 93 % O2 Device: None (Room air)    Vitals:    07/28/24 0558 07/29/24 0400 07/30/24 0430   Weight: 138.1 kg (304 lb 6.4 oz) 136.8 kg (301 lb 9.6 oz) 136.1 kg (300 lb)       GENERAL:  The patient is in no apparent distress.   NECK: CVP appears normal, no masses or thyromegaly.  PULMONARY:  There is a normal respiratory effort. Clear lungs to auscultation bilaterally.   CARDIOVASCULAR:  Irregularly irregular, variable S1, no m/r/g.  GI:  Non tender abdomen with normoactive bowel sounds and no hepatosplenomegaly. There are no masses palpable.   EXTREMITIES:  1+ pitting edema to knees bilaterally  VASCULAR: 2+ Pulses bilaterally in upper and lower extremities.    Medications   Current Facility-Administered Medications   Medication Dose Route Frequency Provider Last Rate Last Admin    - MEDICATION INSTRUCTIONS -   Does not apply DOES NOT GO TO Courtney Blackmon APRN CNP        bumetanide (BUMEX) 0.25 mg/mL infusion  0.5 mg/hr Intravenous Continuous Courtney Roy APRN CNP 2 mL/hr at 07/30/24 0758 0.5 mg/hr at 07/30/24 0758    Continuing ACE inhibitor/ARB/ARNI from home medication list OR ACE inhibitor/ARB/ARNI order already placed during this visit   Does not apply DOES NOT GO TO Courtney Blackmon APRN CNP        Continuing beta blocker from home medication list OR beta blocker order already placed during this visit   Does not apply DOES NOT GO TO Courtney Blackmon APRN CNP        Patient is already receiving anticoagulation  with heparin, enoxaparin (LOVENOX), warfarin (COUMADIN)  or other anticoagulant medication   Does not apply Continuous PRN Courtney Roy APRN CNP         Current Facility-Administered Medications   Medication Dose Route Frequency Provider Last Rate Last Admin    allopurinol (ZYLOPRIM) tablet 300 mg  300 mg Oral Daily Courtney Roy, APRN CNP   300 mg at 07/30/24 0847    cetirizine (zyrTEC) tablet 10 mg  10 mg Oral QPM Courtney Roy APRN CNP   10 mg at 07/29/24 2146    finasteride (PROPECIA) tablet 1 mg  1 mg Oral Daily Courtney Roy APRN CNP   1 mg at 07/30/24 0848    [Held by provider] losartan (COZAAR) tablet 50 mg  50 mg Oral Daily Courtney Roy APRN CNP   50 mg at 07/27/24 0829    magnesium oxide (MAG-OX) tablet 400 mg  400 mg Oral Q4H Masha Moy MD        polyethylene glycol (MIRALAX) Packet 17 g  17 g Oral Daily Courtney Roy APRN CNP   17 g at 07/30/24 0848    potassium chloride pushpa ER (KLOR-CON M20) CR tablet 40 mEq  40 mEq Oral Once Masha Moy MD        potassium chloride pushpa ER (KLOR-CON M20) CR tablet 60 mEq  60 mEq Oral BID Courtney Roy APRN CNP   60 mEq at 07/30/24 0846    predniSONE (DELTASONE) tablet 10 mg  10 mg Oral Daily Courtney Roy APRN CNP   10 mg at 07/30/24 0847    Followed by    [START ON 8/9/2024] predniSONE (DELTASONE) tablet 5 mg  5 mg Oral Daily Courtney Roy APRN CNP        psyllium (METAMUCIL/KONSYL) capsule 1-2 capsule  1-2 capsule Oral Daily Courtney Roy APRN CNP   2 capsule at 07/30/24 0849    rivaroxaban ANTICOAGULANT (XARELTO) tablet 20 mg  20 mg Oral Daily with supper Courtney Roy APRN CNP   20 mg at 07/29/24 1732    sodium chloride (PF) 0.9% PF flush 3 mL  3 mL Intracatheter Q8H Courtney Roy APRN CNP   3 mL at 07/27/24 1050    sotalol (BETAPACE) tablet 120 mg  120 mg Oral Q12H [facility] (08/20) Svetlana Cho APRN CNP        [Held by provider] spironolactone (ALDACTONE) tablet 25 mg  25 mg Oral Daily Courtney Roy APRN  CNP        sulfamethoxazole-trimethoprim (BACTRIM DS) 800-160 MG per tablet 1 tablet  1 tablet Oral Daily Courtney Roy APRN CNP   1 tablet at 07/30/24 0847    [Held by provider] torsemide (DEMADEX) tablet 80 mg  80 mg Oral Daily Courtney Roy APRN CNP           Data   Most Recent 3 CBC's:  Recent Labs   Lab Test 07/27/24  0627 07/26/24  0707 05/16/24  1052   WBC 9.8 10.5 16.4*   HGB 13.7 13.5 15.0   MCV 98 99 98    215 372     Most Recent 3 BMP's:  Recent Labs   Lab Test 07/30/24  0651 07/29/24  1457 07/29/24  0552 07/28/24  1257 07/28/24  0604     --  137  --  136   POTASSIUM 3.1* 4.0 3.1*   < > 3.3*   CHLORIDE 95*  --  93*  --  93*   CO2 34*  --  34*  --  33*   BUN 38.2*  --  42.7*  --  51.1*   CR 1.63*  --  1.58*  --  2.03*   ANIONGAP 9  --  10  --  10   ASHELY 9.1  --  9.1  --  9.0   *  --  119*  --  123*    < > = values in this interval not displayed.

## 2024-07-30 NOTE — PLAN OF CARE
Brayden is A+OX4, VSS on RA, Tele;Afib CVR, denies pain/sob, bumex infusing with good output, independent in room, plan is for ep consult tomorrow  and R/L HC either tomorrow or Wednesday pending consult, pt has been NPO since midnight in case HC is done today. Will continue with POC as ordered, discharge pending.

## 2024-07-30 NOTE — PROGRESS NOTES
[facility]    Internal Medicine Hospitalist Progress Note  07/30/2024  I evaluated patient on the above date.    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    Acute on chronic diastolic CHF exacerbation (HFpEF).  Hypertension (benign essential).  AS, moderate.  [PTA: losartan 50 mg daily; metoprolol 12.5 mg BID; spironolactone 25 mg daily; torsemide 80 mg daily.]  * Echo 5/2024 showed LVEF 55%, no obvious regional wall motion abnormalities; RV borderline dilated with normal systolic function; moderate AS, mild AR.  * On admit, noted primary symptoms of significant weight gain, up to 310 pounds, diffuse edema, dyspnea. Felt to be multifactorial etiology including ongoing corticosteroid use as well as PAF.   * Wt 310 lbs on admit (notes baseline wt around 270 lbs). CXR showed mild bibasilar atelectasis/scar, no pleural effusion, normal pulmonary vascularity. Given bumetanide and chlorothiazide IV dose and started on bumetanide gtt on admit. Cardiology consulted on admit.   Vitals:    07/26/24 0555 07/27/24 0453 07/28/24 0558 07/29/24 0400   Weight: 139.4 kg (307 lb 4.8 oz) 138.8 kg (306 lb) 138.1 kg (304 lb 6.4 oz) 136.8 kg (301 lb 9.6 oz)    07/30/24 0430   Weight: 136.1 kg (300 lb)     I/O last 3 completed shifts:  In: 1690 [P.O.:1690]  Out: 3750 [Urine:3750]  Recent Labs   Lab 07/30/24  1417 07/30/24  0651 07/29/24  1457 07/29/24  0552 07/28/24  1257 07/28/24  0604 07/27/24  1611 07/27/24  0627 07/26/24  2345  07/26/24  1831 07/26/24  1408 07/26/24  1148 07/26/24  0707   NA  --  138  --  137  --  136  --  136 135  --  134* 133* 135   CO2  --  34*  --  34*  --  33*  --  32* 30*  --  30* 31* 32*   BUN  --  38.2*  --  42.7*  --  51.1*  --  46.0* 48.6*  --  42.0* 40.2* 39.3*   CR  --  1.63*  --  1.58*  --  2.03*  --  1.85* 2.09*  --  1.67* 1.69* 1.76*   POTASSIUM 4.3 3.1* 4.0 3.1* 3.8 3.3* 3.8 3.3* 3.7   < > 4.2 3.3* 2.8*    < > = values in this interval not displayed.   - Continue bumetanide gtt; goal is to diurese at least ~30 lbs. Received Diuril on 7/27, no re-dosing on 7/28 due to bump in creatinine.  - Continued losartan and metoprolol.  Losartan placed on hold on 7/28 due to worsening kidney function.  - Hold PTA spironolactone and torsemide for now.  -Diuresing well, so far net -14 L.  Continues on IV Bumex drip.  Anticipate right and left heart catheterization this hospitalization, currently holding off till creatinine stabilizes.  EP consulted-see below.  - Continue 2 g sodium diet and 2 L fluid restriction.  - Continue to monitor i/o's, daily wts.  - Appreciate Cardiology help.  - AF management as noted.    Persistent A-fib, felt to be contributing to acute CHF.  * Follows with Dr. Day. H/o multiple antiarrhythmics including flecainide which he failed and amiodarone resulted in pneumonitis. H/o multiple ablations and cardioversion which have subsequently failed.   * Cardiology consulted on admit.  - Continue to monitor on telemetry.  - Continue PTA metoprolol and rivaroxaban.  -EP consulted on 7/30: Started on sotalol 120 Mg twice daily and plan on cardioversion in next 1 to 2 days depending on fluid status.    Recent pneumonitis, suspect from relapse of inflammatory lung process (h/o pulmonary toxicity from amiodarone versus other inflammatory process), on steroids.  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 Follow-up CT 3/2024 showed bilateral interstitial infiltrates with  likely component of fibrosis. Recent hospitalization 5/2024 with issues including relapsed inflammatory lung process, started on high dose steroids with slow taper by Pulmonology.  * Initial presentation as above. CXR showed mild bibasilar atelectasis/scar, no pleural effusion, normal pulmonary vascularity. O2 sats 90's on RA on admit.  - Continue prednisone 10 mg daily through 8/8/2024; then 5 mg daily starting 8/9/2024 for 60 days.  - Continue sulfamethoxazole-trimethoprim prophylaxis - monitor renal function closely, may need to hold if cr worsens.  - Follow-up with Pulmonology (pending PFTs and repeat CT for 8/2024).    Exertional dyspnea, suspect multifactorial including CHF, recent pneumonitis.  - Continue to treat other issues as noted.    BOWEN with variable compliance secondary to frequent nocturia.  - Continue CPAP with 3L O2 while sleeping.    ROBIN on CKD likely stage III.  * Baseline creatinine 1.5.  * Cr 1.56 on admit. Started on diuresis as above.  Went up to 2.09 but improved again at 1.85 on 7/27.  Went up again to 2.03 on 7/28.  Held off on Diuril on 7/28,  Continue Bumex drip.  Creatinine with fluctuation daily.  Recent Labs   Lab 07/30/24  0651 07/29/24  0552 07/28/24  0604 07/27/24  0627 07/26/24  2345 07/26/24  1408   CR 1.63* 1.58* 2.03* 1.85* 2.09* 1.67*   Estimated Creatinine Clearance: 55.3 mL/min (A) (based on SCr of 1.63 mg/dL (H)).  - Continue to monitor BMP  - Avoid nephrotoxic medications.    Hypokalemia and hypomagnesemia.  * Continued on scheduled potassium on admit.  Recent Labs   Lab 07/30/24  1417 07/30/24  0651 07/29/24  1457 07/29/24  0552 07/28/24  1257 07/28/24  0604 07/27/24  1611 07/27/24  0627 07/26/24  1148 07/26/24  0707 07/25/24  1048 07/24/24  1507   POTASSIUM 4.3 3.1* 4.0 3.1* 3.8 3.3*   < > 3.3*   < > 2.8*   < > 3.8   MAG  --  1.9  --  1.9  --  1.9  --  1.9  --  1.6*  --  2.0   PHOS  --  3.8  --  3.8  --  4.5  --  4.0  --  4.1  --   --     < > = values in this interval  "not displayed.   - Continue scheduled potassium chloride 60 meq BID for now; monitor closely with ROBIN/CKD.  - Continue PRN K and Mg replacement - monitor K levels closely with ROBIN/CKD.    Alcohol use disorder with intermittent spells of sobriety.  * On admit, noted that pt had a few seltzer drinks the past week PTA.  - Cessation encouraged on admit.    Bell's palsy with asymmetric smile.  - Noted.    Lung nodules  * Follows with outpatient Pulmonology.  - Follow-up outpatient.    BPH.  - Continue PTA finasteride.    RLE wound.  * Noted on admit. WOC RN consulted.  - Continue local wound cares per WOC RN.    Severe obesity.  * Body mass index is 45.61 kg/m .  - Needs to continue to pursue aggressive dietary and lifestyle modifications.      Clinically Significant Risk Factors        # Hypokalemia: Lowest K = 3.1 mmol/L in last 2 days, will replace as needed             # Hypertension: Noted on problem list  # Acute heart failure with preserved ejection fraction: heart failure noted on problem list, last echo with EF >50%, and receiving IV diuretics           # Severe Obesity: Estimated body mass index is 45.61 kg/m  as calculated from the following:    Height as of an earlier encounter on 7/25/24: 1.727 m (5' 8\").    Weight as of this encounter: 136.1 kg (300 lb).        # Financial/Environmental Concerns: none         Diet: Fluid restriction 2000 ML FLUID  Combination Diet Regular Diet Adult; Moderate Consistent Carb (60 g CHO per Meal) Diet; 2 gm NA Diet; Low Saturated Fat Diet  NPO per Anesthesia Guidelines for Procedure/Surgery Except for: Meds    Prophylaxis: PCD's, ambulation, and is on DOAC.  Palmer Catheter: Not present  Lines: None     Code Status: Full Code    Disposition Plan   Medically Ready for Discharge: Anticipated in 2-4 Days  Expected discharge to home pending stability/improvement/resolution of above acute issues.    Entered: Masha Moy MD 07/30/2024, 3:16 PM     Communication.      TIME SPENT: " I spent approximately 45 minutes bedside and on the inpatient unit today managing the care of patient. Over 50% of my time on the unit was spent in extensive chart review, counseling the patient/family and/or coordinating care regarding services listed in this note.        Interval History   Stable overnight.  Weight slowly coming down, currently about net -14 L.  No pain or shortness of breath.  Overall feeling better.      * Data reviewed today: I reviewed all new labs and imaging over the last 24 hours. I personally reviewed labs    Physical Exam   Most recent vitals:   , Blood pressure (!) 129/95, pulse 86, temperature 97.5  F (36.4  C), temperature source Oral, resp. rate 16, weight 136.1 kg (300 lb), SpO2 96%. O2 Device: None (Room air)    Vitals:    07/28/24 0558 07/29/24 0400 07/30/24 0430   Weight: 138.1 kg (304 lb 6.4 oz) 136.8 kg (301 lb 9.6 oz) 136.1 kg (300 lb)     Vital signs with ranges:  Temp:  [97.5  F (36.4  C)-98.4  F (36.9  C)] 97.5  F (36.4  C)  Pulse:  [85-96] 86  Resp:  [16-18] 16  BP: ()/(76-98) 129/95  SpO2:  [92 %-96 %] 96 %  Patient Vitals for the past 24 hrs:   BP Temp Temp src Pulse Resp SpO2 Weight   07/30/24 1210 (!) 129/95 97.5  F (36.4  C) Oral 86 16 96 % --   07/30/24 1200 135/83 -- -- 87 -- -- --   07/30/24 1157 114/76 -- -- 86 -- -- --   07/30/24 1150 98/82 -- -- 91 -- -- --   07/30/24 1145 93/83 -- -- 88 -- -- --   07/30/24 0800 (!) 132/97 98.4  F (36.9  C) Axillary 95 18 93 % --   07/30/24 0430 123/82 97.7  F (36.5  C) Oral 85 17 92 % 136.1 kg (300 lb)   07/30/24 0005 (!) 135/95 97.8  F (36.6  C) Oral 85 16 96 % --   07/29/24 2005 (!) 136/92 -- -- 96 -- 92 % --   07/29/24 2000 (!) 144/98 97.5  F (36.4  C) Oral -- 16 92 % --   07/29/24 1524 (!) 134/96 97.5  F (36.4  C) Oral 92 16 94 % --     I/O's last 24 hours:  I/O last 3 completed shifts:  In: 1690 [P.O.:1690]  Out: 3750 [Urine:3750]    Constitutional: awake, alert, oriented, pleasant, conversant, sitting up in  "chair  Cardiovascular: Irregular, systolic murmur noted, bilateral lower extremity 2+ pitting edema noted  Lungs: diminished in the bases, no crackles or wheezes  Gastrointestinal/Abdomen: soft, non-tender, non-distended, positive bowel sounds  Skin: bilateral lower extremities with 2+ pitting edema  Neurologic: Alert and conversing appropriately, moving all extremities, fluent speech  Psychiatric: Calm and pleasant       Data    Labs reviewed.  Recent Labs   Lab 07/30/24  1417 07/30/24  0651 07/29/24  1457 07/29/24  0552 07/28/24  1257 07/28/24  0604 07/27/24  1611 07/27/24  0627 07/26/24  1148 07/26/24  0707   WBC  --   --   --   --   --   --   --  9.8  --  10.5   HGB  --   --   --   --   --   --   --  13.7  --  13.5   MCV  --   --   --   --   --   --   --  98  --  99   PLT  --   --   --   --   --   --   --  231  --  215   NA  --  138  --  137  --  136  --  136   < > 135   POTASSIUM 4.3 3.1* 4.0 3.1*   < > 3.3*   < > 3.3*   < > 2.8*   CHLORIDE  --  95*  --  93*  --  93*  --  94*   < > 92*   CO2  --  34*  --  34*  --  33*  --  32*   < > 32*   BUN  --  38.2*  --  42.7*  --  51.1*  --  46.0*   < > 39.3*   CR  --  1.63*  --  1.58*  --  2.03*  --  1.85*   < > 1.76*   ANIONGAP  --  9  --  10  --  10  --  10   < > 11   ASHELY  --  9.1  --  9.1  --  9.0  --  8.8   < > 8.9   GLC  --  113*  --  119*  --  123*  --  120*   < > 119*    < > = values in this interval not displayed.     Recent Labs   Lab Test 05/05/24  1515 05/05/24  1003 02/11/24  0848 02/11/24 0220 11/28/23  0623   NT-PROBNP, INPATIENT  --  1,202*  --  442 160   TROPONIN T HIGH SENSITIVITY 85* 90* 59* 57*  --      Recent Labs   Lab 07/30/24  0651 07/29/24  0552 07/28/24  0604 07/27/24  0627 07/26/24  2345 07/26/24  1408   * 119* 123* 120* 124* 185*     Recent Labs   Lab Test 02/25/19  0756 10/12/17  0814   A1C 5.7* 5.7       No results for input(s): \"INR\", \"[id number]\" in the last 168 hours.  Recent Labs   Lab 07/27/24  0627 07/26/24  0707   WBC 9.8 10.5 "       MICRO:  CULTURES (INCLUDING BLOOD AND URINE):  No lab results found in last 7 days.    Recent Results (from the past 24 hour(s))   US Abdomen Limited    Narrative    US ABDOMEN LIMITED 7/29/2024 4:05 PM    CLINICAL HISTORY: rule out ascites  TECHNIQUE: CT abdomen pelvis 8/3/2023    COMPARISON: None.      Impression    IMPRESSION:    Targeted ultrasound scanning of the 4 abdominal quadrants demonstrates  no ascites.    SEBASTIÁN CHIU MD         SYSTEM ID:  [id number]         Medications   All medications were reviewed. MAR.    Infusions:  Current Facility-Administered Medications   Medication Dose Route Frequency Provider Last Rate Last Admin    - MEDICATION INSTRUCTIONS -   Does not apply DOES NOT GO TO Courtney Blackmon APRN CNP        bumetanide (BUMEX) 0.25 mg/mL infusion  0.75 mg/hr Intravenous Continuous Wayne Harris MD 2 mL/hr at 07/30/24 0758 0.5 mg/hr at 07/30/24 0758    Continuing ACE inhibitor/ARB/ARNI from home medication list OR ACE inhibitor/ARB/ARNI order already placed during this visit   Does not apply DOES NOT GO TO Courtney Blackmon APRN CNP        Continuing beta blocker from home medication list OR beta blocker order already placed during this visit   Does not apply DOES NOT GO TO Courtney Blackmon APRN CNP        Patient is already receiving anticoagulation with heparin, enoxaparin (LOVENOX), warfarin (COUMADIN)  or other anticoagulant medication   Does not apply Continuous PRN Courtney Roy APRN CNP         Scheduled Medications:  Current Facility-Administered Medications   Medication Dose Route Frequency Provider Last Rate Last Admin    allopurinol (ZYLOPRIM) tablet 300 mg  300 mg Oral Daily Courtney Roy APRN CNP   300 mg at 07/30/24 0847    cetirizine (zyrTEC) tablet 10 mg  10 mg Oral QPM Courtney Roy APRN CNP   10 mg at 07/29/24 2146    finasteride (PROPECIA) tablet 1 mg  1 mg Oral Daily Courtney Roy APRN CNP   1 mg at 07/30/24 0848    [Held by  provider] losartan (COZAAR) tablet 50 mg  50 mg Oral Daily Courtney Roy APRN CNP   50 mg at 07/27/24 0829    magnesium oxide (MAG-OX) tablet 400 mg  400 mg Oral Q4H Masha Moy MD   400 mg at 07/30/24 1058    polyethylene glycol (MIRALAX) Packet 17 g  17 g Oral Daily Courtney Roy APRN CNP   17 g at 07/30/24 0848    potassium chloride pushpa ER (KLOR-CON M20) CR tablet 60 mEq  60 mEq Oral TID Malika Blanco PA-C        predniSONE (DELTASONE) tablet 10 mg  10 mg Oral Daily Courtney Roy APRN CNP   10 mg at 07/30/24 0847    Followed by    [START ON 8/9/2024] predniSONE (DELTASONE) tablet 5 mg  5 mg Oral Daily Courtney Roy APRN CNP        psyllium (METAMUCIL/KONSYL) capsule 1-2 capsule  1-2 capsule Oral Daily Courtney Roy APRN CNP   2 capsule at 07/30/24 0849    rivaroxaban ANTICOAGULANT (XARELTO) tablet 20 mg  20 mg Oral Daily with supper Courtney Roy APRN CNP   20 mg at 07/29/24 1732    sodium chloride (PF) 0.9% PF flush 3 mL  3 mL Intracatheter Q8H Courtney Roy APRN CNP   3 mL at 07/27/24 1050    sotalol (BETAPACE) tablet 120 mg  120 mg Oral Q12H [facility] (08/20) Svetlana Cho APRN CNP   120 mg at 07/30/24 1058    [Held by provider] spironolactone (ALDACTONE) tablet 25 mg  25 mg Oral Daily Courtney Roy APRN CNP        sulfamethoxazole-trimethoprim (BACTRIM DS) 800-160 MG per tablet 1 tablet  1 tablet Oral Daily Courtney Roy APRN CNP   1 tablet at 07/30/24 0847    [Held by provider] torsemide (DEMADEX) tablet 80 mg  80 mg Oral Daily Courtney Roy APRN CNP         PRN Medications:  Current Facility-Administered Medications   Medication Dose Route Frequency Provider Last Rate Last Admin    - MEDICATION INSTRUCTIONS -   Does not apply DOES NOT GO TO Courtney Blackmon APRN CNP        acetaminophen (TYLENOL) tablet 650 mg  650 mg Oral Q4H PRN Courteny Roy APRN CNP        Or    acetaminophen (TYLENOL) Suppository 650 mg  650 mg Rectal Q4H PRN Courtney Roy APRN CNP         calcium carbonate (TUMS) chewable tablet 1,000 mg  1,000 mg Oral 4x Daily PRN Courtney Roy APRN CNP        Continuing ACE inhibitor/ARB/ARNI from home medication list OR ACE inhibitor/ARB/ARNI order already placed during this visit   Does not apply DOES NOT GO TO Courtney Blackmon APRN CNP        Continuing beta blocker from home medication list OR beta blocker order already placed during this visit   Does not apply DOES NOT GO TO Courtney Blackmon APRN CNP        lidocaine (LMX4) cream   Topical Q1H PRN Courtney Roy APRN CNP        lidocaine 1 % 0.1-1 mL  0.1-1 mL Other Q1H PRCourtney Diggs APRN CNP        melatonin tablet 1 mg  1 mg Oral At Bedtime PRCourtney Diggs APRN CNP        ondansetron (ZOFRAN ODT) ODT tab 4 mg  4 mg Oral Q6H PRCourtney Diggs APRN CNP        Or    ondansetron (ZOFRAN) injection 4 mg  4 mg Intravenous Q6H PRCourtney Diggs APRN CNP        Patient is already receiving anticoagulation with heparin, enoxaparin (LOVENOX), warfarin (COUMADIN)  or other anticoagulant medication   Does not apply Continuous PRCourtney Diggs APRN CNP        prochlorperazine (COMPAZINE) injection 5 mg  5 mg Intravenous Q6H PRCourtney Diggs APRN CNP        Or    prochlorperazine (COMPAZINE) tablet 5 mg  5 mg Oral Q6H PRCourtney Diggs APRN CNP        Or    prochlorperazine (COMPAZINE) suppository 12.5 mg  12.5 mg Rectal Q12H PRCourtney Diggs APRN CNP        senna-docusate (SENOKOT-S/PERICOLACE) 8.6-50 MG per tablet 1 tablet  1 tablet Oral BID PRN Courtney Roy APRN CNP        Or    senna-docusate (SENOKOT-S/PERICOLACE) 8.6-50 MG per tablet 2 tablet  2 tablet Oral BID PRCourtney Diggs APRN CNP        sodium chloride (PF) 0.9% PF flush 3 mL  3 mL Intracatheter q1 min prn Courtney Roy, AYAN CNP

## 2024-07-30 NOTE — CONSULTS
[facility]    Received [facility] Consult from Courtney Roy NP.     Reviewed Brayden's chart and note they are admitted for HFpEF, persistent Afib. Echocardiogram shows LVEF 55%. This is their 3rd admission in the past year for concerns of heart failure/Afib.     Given above information and LVEF >/= 40% , Brayden meets criteria for general Cardiology follow up versus EP follow up. Will defer to inpatient Cardiology/Electrophysiology teams.     Bedside RN to do CHF education.       Future Appointments   Date Time Provider Department Center   7/30/2024  4:00 PM Lisa Good OT SHOT [facility]   7/31/2024 11:15 AM Marta Suarez OTR SHOT [facility]   8/6/2024  4:00 PM Yuni Emery MD [facility]   8/8/2024  7:00 AM UC PFL A [facility]   8/8/2024  7:30 AM  PFL 6 MINUTE WALK 1 [facility]   8/8/2024  8:40 AM [id number] [facility]   8/8/2024  9:00 AM Vinayak Valles MD [facility]   9/10/2024  4:15 PM Fady aDy MD [facility]   9/30/2024  9:45 AM Maninder Huffman MD [facility]   11/11/2024 10:15 AM Pedro Luis Romeo MD [facility]       Please call with questions.      Olga Rojas RN BSN  [facility] RN Care Coordinator   [facility]   [phone]   [facility]: Cardiomyopathy, Optimization, Rehabilitation, Education

## 2024-07-30 NOTE — PLAN OF CARE
Goal Outcome Evaluation:  Neuro- A&OX4  Most Recent Vitals- Temp: 97.6  F (36.4  C) Temp src: Oral BP: (!) 112/91 Pulse: 86   Resp: 18 SpO2: 96 % O2 Device: None (Room air)   Tele/Cardiac- A-fib CVR  Resp- RA  Activity- independent   Pain- Denies   Drips- Bumex gtt 0.75mg/hr   Drains/Tubes- P-[id number]  Skin- Right shin wound, dressing CDI. BLE edema   GI/- wdl,excellent urine out put   Aggression Color- Green  COVID status- Negative  Plan- NPO after midnight for possible DCCV/RHC tomorrow.     Misc-     Osito Ferrell RN

## 2024-07-30 NOTE — PLAN OF CARE
Goal Outcome Evaluation:    VSS. Monitor shows Atrial fib with CVR. Pt. Is alert and oriented. Bumex drip at 0.5 mg/hr. Pt. Denies pain.   Plan for EP consult in am, right and left heart cath tomorrow or Wednesday.

## 2024-07-31 ENCOUNTER — APPOINTMENT (OUTPATIENT)
Dept: OCCUPATIONAL THERAPY | Facility: CLINIC | Age: 74
DRG: 286 | End: 2024-07-31
Attending: STUDENT IN AN ORGANIZED HEALTH CARE EDUCATION/TRAINING PROGRAM
Payer: COMMERCIAL

## 2024-07-31 LAB
ANION GAP SERPL CALCULATED.3IONS-SCNC: 11 MMOL/L (ref 7–15)
ATRIAL RATE - MUSE: 166 BPM
ATRIAL RATE - MUSE: 78 BPM
BASE EXCESS BLDV CALC-SCNC: 10 MMOL/L (ref -3–3)
BUN SERPL-MCNC: 38.4 MG/DL (ref 8–23)
CALCIUM SERPL-MCNC: 9.3 MG/DL (ref 8.8–10.4)
CHLORIDE SERPL-SCNC: 93 MMOL/L (ref 98–107)
CREAT SERPL-MCNC: 1.75 MG/DL (ref 0.67–1.17)
DIASTOLIC BLOOD PRESSURE - MUSE: NORMAL MMHG
DIASTOLIC BLOOD PRESSURE - MUSE: NORMAL MMHG
EGFRCR SERPLBLD CKD-EPI 2021: 41 ML/MIN/1.73M2
GLUCOSE SERPL-MCNC: 127 MG/DL (ref 70–99)
HCO3 BLDV-SCNC: 37 MMOL/L (ref 21–28)
HCO3 SERPL-SCNC: 32 MMOL/L (ref 22–29)
INTERPRETATION ECG - MUSE: NORMAL
INTERPRETATION ECG - MUSE: NORMAL
LACTATE BLD-SCNC: 0.8 MMOL/L
MAGNESIUM SERPL-MCNC: 1.9 MG/DL (ref 1.7–2.3)
P AXIS - MUSE: NORMAL DEGREES
P AXIS - MUSE: NORMAL DEGREES
PCO2 BLDV: 54 MM HG (ref 40–50)
PH BLDV: 7.44 [PH] (ref 7.32–7.43)
PHOSPHATE SERPL-MCNC: 3.8 MG/DL (ref 2.5–4.5)
PO2 BLDV: 29 MM HG (ref 25–47)
POTASSIUM SERPL-SCNC: 3.7 MMOL/L (ref 3.4–5.3)
PR INTERVAL - MUSE: NORMAL MS
PR INTERVAL - MUSE: NORMAL MS
QRS DURATION - MUSE: 92 MS
QRS DURATION - MUSE: 94 MS
QT - MUSE: 504 MS
QT - MUSE: 512 MS
QTC - MUSE: 559 MS
QTC - MUSE: 571 MS
R AXIS - MUSE: 49 DEGREES
R AXIS - MUSE: 66 DEGREES
SAO2 % BLDV: 56 % (ref 70–75)
SODIUM SERPL-SCNC: 136 MMOL/L (ref 135–145)
SYSTOLIC BLOOD PRESSURE - MUSE: NORMAL MMHG
SYSTOLIC BLOOD PRESSURE - MUSE: NORMAL MMHG
T AXIS - MUSE: 102 DEGREES
T AXIS - MUSE: 90 DEGREES
VENTRICULAR RATE- MUSE: 74 BPM
VENTRICULAR RATE- MUSE: 75 BPM

## 2024-07-31 PROCEDURE — 36415 COLL VENOUS BLD VENIPUNCTURE: CPT | Performed by: HOSPITALIST

## 2024-07-31 PROCEDURE — C1894 INTRO/SHEATH, NON-LASER: HCPCS | Performed by: INTERNAL MEDICINE

## 2024-07-31 PROCEDURE — 4A023N6 MEASUREMENT OF CARDIAC SAMPLING AND PRESSURE, RIGHT HEART, PERCUTANEOUS APPROACH: ICD-10-PCS | Performed by: INTERNAL MEDICINE

## 2024-07-31 PROCEDURE — 250N000009 HC RX 250: Performed by: INTERNAL MEDICINE

## 2024-07-31 PROCEDURE — 80048 BASIC METABOLIC PNL TOTAL CA: CPT | Performed by: HOSPITALIST

## 2024-07-31 PROCEDURE — 97110 THERAPEUTIC EXERCISES: CPT | Mod: GO

## 2024-07-31 PROCEDURE — 99233 SBSQ HOSP IP/OBS HIGH 50: CPT | Performed by: HOSPITALIST

## 2024-07-31 PROCEDURE — 250N000013 HC RX MED GY IP 250 OP 250 PS 637: Performed by: NURSE PRACTITIONER

## 2024-07-31 PROCEDURE — 250N000013 HC RX MED GY IP 250 OP 250 PS 637: Performed by: PHYSICIAN ASSISTANT

## 2024-07-31 PROCEDURE — 99233 SBSQ HOSP IP/OBS HIGH 50: CPT | Mod: 25 | Performed by: PHYSICIAN ASSISTANT

## 2024-07-31 PROCEDURE — 93451 RIGHT HEART CATH: CPT | Mod: 26 | Performed by: INTERNAL MEDICINE

## 2024-07-31 PROCEDURE — 82803 BLOOD GASES ANY COMBINATION: CPT

## 2024-07-31 PROCEDURE — 83735 ASSAY OF MAGNESIUM: CPT | Performed by: HOSPITALIST

## 2024-07-31 PROCEDURE — 250N000012 HC RX MED GY IP 250 OP 636 PS 637: Performed by: NURSE PRACTITIONER

## 2024-07-31 PROCEDURE — 93005 ELECTROCARDIOGRAM TRACING: CPT

## 2024-07-31 PROCEDURE — C1769 GUIDE WIRE: HCPCS | Performed by: INTERNAL MEDICINE

## 2024-07-31 PROCEDURE — 93451 RIGHT HEART CATH: CPT

## 2024-07-31 PROCEDURE — 250N000013 HC RX MED GY IP 250 OP 250 PS 637: Performed by: HOSPITALIST

## 2024-07-31 PROCEDURE — C1751 CATH, INF, PER/CENT/MIDLINE: HCPCS | Performed by: INTERNAL MEDICINE

## 2024-07-31 PROCEDURE — 210N000002 HC R&B HEART CARE

## 2024-07-31 PROCEDURE — 272N000001 HC OR GENERAL SUPPLY STERILE: Performed by: INTERNAL MEDICINE

## 2024-07-31 PROCEDURE — 93010 ELECTROCARDIOGRAM REPORT: CPT | Mod: XU | Performed by: INTERNAL MEDICINE

## 2024-07-31 PROCEDURE — 02H632Z INSERTION OF MONITORING DEVICE INTO RIGHT ATRIUM, PERCUTANEOUS APPROACH: ICD-10-PCS | Performed by: INTERNAL MEDICINE

## 2024-07-31 PROCEDURE — 84100 ASSAY OF PHOSPHORUS: CPT | Performed by: HOSPITALIST

## 2024-07-31 RX ORDER — LORAZEPAM 0.5 MG/1
0.5 TABLET ORAL
Status: DISCONTINUED | OUTPATIENT
Start: 2024-07-31 | End: 2024-07-31 | Stop reason: HOSPADM

## 2024-07-31 RX ORDER — POTASSIUM CHLORIDE 1500 MG/1
20 TABLET, EXTENDED RELEASE ORAL ONCE
Status: DISCONTINUED | OUTPATIENT
Start: 2024-07-31 | End: 2024-08-01

## 2024-07-31 RX ORDER — LORAZEPAM 2 MG/ML
0.5 INJECTION INTRAMUSCULAR
Status: DISCONTINUED | OUTPATIENT
Start: 2024-07-31 | End: 2024-07-31 | Stop reason: HOSPADM

## 2024-07-31 RX ORDER — POTASSIUM CHLORIDE 1500 MG/1
20 TABLET, EXTENDED RELEASE ORAL
Status: DISCONTINUED | OUTPATIENT
Start: 2024-07-31 | End: 2024-07-31 | Stop reason: HOSPADM

## 2024-07-31 RX ORDER — SOTALOL HYDROCHLORIDE 80 MG/1
80 TABLET ORAL EVERY 12 HOURS SCHEDULED
Status: DISCONTINUED | OUTPATIENT
Start: 2024-07-31 | End: 2024-08-02

## 2024-07-31 RX ORDER — MAGNESIUM OXIDE 400 MG/1
400 TABLET ORAL EVERY 4 HOURS
Status: COMPLETED | OUTPATIENT
Start: 2024-07-31 | End: 2024-07-31

## 2024-07-31 RX ORDER — LIDOCAINE 40 MG/G
CREAM TOPICAL
Status: DISCONTINUED | OUTPATIENT
Start: 2024-07-31 | End: 2024-07-31 | Stop reason: HOSPADM

## 2024-07-31 RX ADMIN — LOSARTAN POTASSIUM 50 MG: 50 TABLET, FILM COATED ORAL at 08:57

## 2024-07-31 RX ADMIN — CETIRIZINE HYDROCHLORIDE 10 MG: 10 TABLET, FILM COATED ORAL at 20:40

## 2024-07-31 RX ADMIN — POTASSIUM CHLORIDE 60 MEQ: 1500 TABLET, EXTENDED RELEASE ORAL at 16:32

## 2024-07-31 RX ADMIN — Medication 400 MG: at 13:17

## 2024-07-31 RX ADMIN — SOTALOL HYDROCHLORIDE 80 MG: 80 TABLET ORAL at 08:50

## 2024-07-31 RX ADMIN — POLYETHYLENE GLYCOL 3350 17 G: 17 POWDER, FOR SOLUTION ORAL at 08:50

## 2024-07-31 RX ADMIN — POTASSIUM CHLORIDE 60 MEQ: 1500 TABLET, EXTENDED RELEASE ORAL at 21:58

## 2024-07-31 RX ADMIN — SULFAMETHOXAZOLE AND TRIMETHOPRIM 1 TABLET: 800; 160 TABLET ORAL at 08:57

## 2024-07-31 RX ADMIN — Medication 2 CAPSULE: at 10:48

## 2024-07-31 RX ADMIN — FINASTERIDE 1 MG: 1 TABLET, FILM COATED ORAL at 08:50

## 2024-07-31 RX ADMIN — RIVAROXABAN 20 MG: 20 TABLET, FILM COATED ORAL at 16:32

## 2024-07-31 RX ADMIN — MICONAZOLE NITRATE: 2 POWDER TOPICAL at 12:58

## 2024-07-31 RX ADMIN — ALLOPURINOL 300 MG: 300 TABLET ORAL at 08:50

## 2024-07-31 RX ADMIN — MICONAZOLE NITRATE: 2 POWDER TOPICAL at 20:40

## 2024-07-31 RX ADMIN — PREDNISONE 10 MG: 10 TABLET ORAL at 08:50

## 2024-07-31 RX ADMIN — Medication 400 MG: at 08:50

## 2024-07-31 RX ADMIN — BUMETANIDE 0.75 MG/HR: 0.25 INJECTION INTRAMUSCULAR; INTRAVENOUS at 01:34

## 2024-07-31 RX ADMIN — BUMETANIDE 0.75 MG/HR: 0.25 INJECTION INTRAMUSCULAR; INTRAVENOUS at 18:34

## 2024-07-31 RX ADMIN — SOTALOL HYDROCHLORIDE 80 MG: 80 TABLET ORAL at 20:40

## 2024-07-31 RX ADMIN — POTASSIUM CHLORIDE 60 MEQ: 1500 TABLET, EXTENDED RELEASE ORAL at 08:51

## 2024-07-31 ASSESSMENT — ACTIVITIES OF DAILY LIVING (ADL)
ADLS_ACUITY_SCORE: 28
ADLS_ACUITY_SCORE: 24
ADLS_ACUITY_SCORE: 28
ADLS_ACUITY_SCORE: 24
ADLS_ACUITY_SCORE: 28

## 2024-07-31 NOTE — CONSULTS
SPIRITUAL HEALTH SERVICES  SPIRITUAL ASSESSMENT Consult Note  [facility]     REFERRAL SOURCE: Introductory visit for emotional support    Met with Brayden who shared that his daughter  last month after a 2 1/2 year cancer diagnosis. Brayden acknowledges the stress that her illness and death have had on the family and his own health. He welcomed grief resources, which were provided.    Brayden declined other support, but is aware of spiritual health support and how to request it in the future, if desire.    PLAN: Delivered grief resources. Spiritual Health remains available for support. Please consult as needs arise.    Orin Julio  Associate      [facility] available  for emergent requests/referrals, either by paging the on-call  or by entering an ASAP/STAT consult in Epic (this will also page the on-call ).

## 2024-07-31 NOTE — PLAN OF CARE
Goal Outcome Evaluation:      Orientation: A&Ox4     Vitals/Tele: vss on ra Tele: Afib CVR w/ frequent PVC's    IV Access/drains: Bumex gtt 0.75mg/hr     Diet: NPO since midnight    Mobility: independent     GI/: continent voiding using urinal at bedside excellent urine output     Wound/Skin: R shin wound     Consults/ procedures/ Tests: possible DCCV/RHC today     Discharge Plan pending       See Flow sheets for assessment

## 2024-07-31 NOTE — PROGRESS NOTES
[facility]    Internal Medicine Hospitalist Progress Note  07/31/2024  I evaluated patient on the above date.    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    Acute on chronic diastolic CHF exacerbation (HFpEF).  Hypertension (benign essential).  AS, moderate.  [PTA: losartan 50 mg daily; metoprolol 12.5 mg BID; spironolactone 25 mg daily; torsemide 80 mg daily.]  * Echo 5/2024 showed LVEF 55%, no obvious regional wall motion abnormalities; RV borderline dilated with normal systolic function; moderate AS, mild AR.  * On admit, noted primary symptoms of significant weight gain, up to 310 pounds, diffuse edema, dyspnea. Felt to be multifactorial etiology including ongoing corticosteroid use as well as PAF.   * Wt 310 lbs on admit (notes baseline wt around 270 lbs). CXR showed mild bibasilar atelectasis/scar, no pleural effusion, normal pulmonary vascularity. Given bumetanide and chlorothiazide IV dose and started on bumetanide gtt on admit. Cardiology consulted on admit.   Vitals:    07/27/24 0453 07/28/24 0558 07/29/24 0400 07/30/24 0430   Weight: 138.8 kg (306 lb) 138.1 kg (304 lb 6.4 oz) 136.8 kg (301 lb 9.6 oz) 136.1 kg (300 lb)    07/31/24 0507   Weight: 135.1 kg (297 lb 12.8 oz)     I/O last 3 completed shifts:  In: 1880 [P.O.:1880]  Out: 5300 [Urine:5300]  Recent Labs   Lab 07/31/24  0644 07/30/24  1524 07/30/24  1417 07/30/24  0651 07/29/24  1457 07/29/24  0552 07/28/24  1257 07/28/24  0604 07/27/24  1610  07/27/24  0627 07/26/24  2345 07/26/24  1831 07/26/24  1408 07/26/24  1148     --   --  138  --  137  --  136  --  136 135  --  134* 133*   CO2 32*  --   --  34*  --  34*  --  33*  --  32* 30*  --  30* 31*   BUN 38.4*  --   --  38.2*  --  42.7*  --  51.1*  --  46.0* 48.6*  --  42.0* 40.2*   CR 1.75*  --   --  1.63*  --  1.58*  --  2.03*  --  1.85* 2.09*  --  1.67* 1.69*   POTASSIUM 3.7 3.8 4.3 3.1* 4.0 3.1* 3.8 3.3*   < > 3.3* 3.7   < > 4.2 3.3*    < > = values in this interval not displayed.   - Continue bumetanide gtt; goal is to diurese at least ~30 lbs. Received Diuril on 7/27, no re-dosing on 7/28 due to bump in creatinine.  - Continued losartan and metoprolol.  Losartan placed on hold on 7/28 due to worsening kidney function.  - Hold PTA spironolactone and torsemide for now.  -Diuresing well.  Continues on IV Bumex drip.  Discussed with cardiology on 7/31-plans for RHC today.  EP consulted-see below.  - Continue 2 g sodium diet and 2 L fluid restriction.  - Continue to monitor i/o's, daily wts.  - Appreciate Cardiology help.  - AF management as noted.    Persistent A-fib, felt to be contributing to acute CHF.  * Follows with Dr. Day. H/o multiple antiarrhythmics including flecainide which he failed and amiodarone resulted in pneumonitis. H/o multiple ablations and cardioversion which have subsequently failed.   * Cardiology consulted on admit.  - Continue to monitor on telemetry.  - Continue PTA metoprolol and rivaroxaban.  -EP consulted on 7/30: Started on sotalol 120 Mg twice daily .  Dose was decreased to 80 Mg twice daily on 7/31 given rising creatinine and prolonged QT on EKG.  Plan for cardioversion on 8/1.    Recent pneumonitis, suspect from relapse of inflammatory lung process (h/o pulmonary toxicity from amiodarone versus other inflammatory process), on steroids.  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 Follow-up CT 3/2024 showed bilateral interstitial  infiltrates with likely component of fibrosis. Recent hospitalization 5/2024 with issues including relapsed inflammatory lung process, started on high dose steroids with slow taper by Pulmonology.  * Initial presentation as above. CXR showed mild bibasilar atelectasis/scar, no pleural effusion, normal pulmonary vascularity. O2 sats 90's on RA on admit.  - Continue prednisone 10 mg daily through 8/8/2024; then 5 mg daily starting 8/9/2024 for 60 days.  - Continue sulfamethoxazole-trimethoprim prophylaxis - monitor renal function closely, may need to hold if cr worsens.  - Follow-up with Pulmonology (pending PFTs and repeat CT for 8/2024).    Exertional dyspnea, suspect multifactorial including CHF, recent pneumonitis.  - Continue to treat other issues as noted.    BOWEN with variable compliance secondary to frequent nocturia.  - Continue CPAP with 3L O2 while sleeping.    ROBIN on CKD likely stage III.  * Baseline creatinine 1.5.  * Cr 1.56 on admit. Started on diuresis as above.  Went up to 2.09 but improved again at 1.85 on 7/27.  Went up again to 2.03 on 7/28.  Held off on Diuril on 7/28,  Continue Bumex drip.  Creatinine with fluctuation daily, now slowly rising.  Recent Labs   Lab 07/31/24  0644 07/30/24  0651 07/29/24  0552 07/28/24  0604 07/27/24  0627 07/26/24  2345   CR 1.75* 1.63* 1.58* 2.03* 1.85* 2.09*   Estimated Creatinine Clearance: 51.3 mL/min (A) (based on SCr of 1.75 mg/dL (H)).  - Continue to monitor BMP  - Avoid nephrotoxic medications.    Hypokalemia and hypomagnesemia.  * Continued on scheduled potassium on admit.  Recent Labs   Lab 07/31/24  0644 07/30/24  1524 07/30/24  1417 07/30/24  0651 07/29/24  1457 07/29/24  0552 07/28/24  1257 07/28/24  0604 07/27/24  1611 07/27/24  0627 07/26/24  1148 07/26/24  0707   POTASSIUM 3.7 3.8 4.3 3.1* 4.0 3.1*   < > 3.3*   < > 3.3*   < > 2.8*   MAG 1.9  --   --  1.9  --  1.9  --  1.9  --  1.9  --  1.6*   PHOS 3.8  --   --  3.8  --  3.8  --  4.5  --  4.0  --  4.1  "   < > = values in this interval not displayed.   - Continue scheduled potassium chloride 60 meq BID for now; monitor closely with ROBIN/CKD.  - Continue PRN K and Mg replacement - monitor K levels closely with ROBIN/CKD.    Alcohol use disorder with intermittent spells of sobriety.  * On admit, noted that pt had a few seltzer drinks the past week PTA.  - Cessation encouraged on admit.    Bell's palsy with asymmetric smile.  - Noted.    Lung nodules  * Follows with outpatient Pulmonology.  - Follow-up outpatient.    BPH.  - Continue PTA finasteride.    RLE wound.  * Noted on admit. WOC RN consulted.  - Continue local wound cares per WOC RN.    Severe obesity.  * Body mass index is 45.28 kg/m .  - Needs to continue to pursue aggressive dietary and lifestyle modifications.      Clinically Significant Risk Factors        # Hypokalemia: Lowest K = 3.1 mmol/L in last 2 days, will replace as needed             # Hypertension: Noted on problem list  # Acute heart failure with preserved ejection fraction: heart failure noted on problem list, last echo with EF >50%, and receiving IV diuretics           # Severe Obesity: Estimated body mass index is 45.28 kg/m  as calculated from the following:    Height as of an earlier encounter on 7/25/24: 1.727 m (5' 8\").    Weight as of this encounter: 135.1 kg (297 lb 12.8 oz).        # Financial/Environmental Concerns: none         Diet: Fluid restriction 2000 ML FLUID  NPO per Anesthesia Guidelines for Procedure/Surgery Except for: Meds    Prophylaxis: PCD's, ambulation, and is on DOAC.  Palmer Catheter: Not present  Lines: None     Code Status: Full Code    Disposition Plan   Medically Ready for Discharge: Anticipated in 2-4 Days, pending clearance from cardiology, EP cardiology.  Patient hoping to go home by end of the week.      Expected discharge to home pending stability/improvement/resolution of above acute issues.    Entered: Masha Moy MD 07/31/2024, 2:06 PM   "   Communication.      TIME SPENT: I spent approximately 50 minutes bedside and on the inpatient unit today managing the care of patient. Over 50% of my time on the unit was spent in extensive chart review, counseling the patient/family and/or coordinating care regarding services listed in this note.        Interval History   Stable overnight.  Weight slowly coming down, now less than 300 pounds.  Breathing is okay.  Discussed with cardiology, plans for RHC today.  Cardioversion tomorrow.  Patient hoping to go home by end of the week.    * Data reviewed today: I reviewed all new labs and imaging over the last 24 hours. I personally reviewed labs    Physical Exam   Most recent vitals:   , Blood pressure 110/88, pulse 70, temperature 97.8  F (36.6  C), temperature source Oral, resp. rate 16, weight 135.1 kg (297 lb 12.8 oz), SpO2 92%. O2 Device: None (Room air)    Vitals:    07/29/24 0400 07/30/24 0430 07/31/24 0507   Weight: 136.8 kg (301 lb 9.6 oz) 136.1 kg (300 lb) 135.1 kg (297 lb 12.8 oz)     Vital signs with ranges:  Temp:  [97.5  F (36.4  C)-98  F (36.7  C)] 97.8  F (36.6  C)  Pulse:  [70-87] 70  Resp:  [16-18] 16  BP: (105-127)/() 110/88  SpO2:  [92 %-96 %] 92 %  Patient Vitals for the past 24 hrs:   BP Temp Temp src Pulse Resp SpO2 Weight   07/31/24 1330 110/88 -- -- 70 -- -- --   07/31/24 1315 105/78 -- -- 79 -- 92 % --   07/31/24 1300 109/81 -- -- 75 -- 94 % --   07/31/24 1248 109/80 -- -- 87 -- 94 % --   07/31/24 0841 (!) 120/94 -- -- -- -- -- --   07/31/24 0821 (!) 117/100 -- -- 82 -- -- --   07/31/24 0723 126/89 97.8  F (36.6  C) Oral 76 16 96 % --   07/31/24 0507 (!) 124/92 -- -- 71 16 -- 135.1 kg (297 lb 12.8 oz)   07/30/24 2348 (!) 116/94 98  F (36.7  C) Oral 74 16 92 % --   07/30/24 2100 127/88 97.5  F (36.4  C) Oral 80 18 93 % --   07/30/24 1621 (!) 112/91 97.6  F (36.4  C) Oral -- 18 96 % --     I/O's last 24 hours:  I/O last 3 completed shifts:  In: 1880 [P.O.:1880]  Out: 5847  "[Urine:5300]    Constitutional: awake, alert, oriented, pleasant, conversant, sitting up in chair  Cardiovascular: Irregular, systolic murmur noted, bilateral lower extremity 2+ pitting edema noted  Lungs: diminished in the bases, no crackles or wheezes  Gastrointestinal/Abdomen: soft, non-tender, non-distended, positive bowel sounds  Skin: bilateral lower extremities with 2+ pitting edema  Neurologic: Alert and conversing appropriately, moving all extremities, fluent speech  Psychiatric: Calm and pleasant       Data    Labs reviewed.  Recent Labs   Lab 07/31/24  0644 07/30/24  1524 07/30/24  1417 07/30/24  0651 07/29/24  1457 07/29/24  0552 07/27/24  1611 07/27/24  0627 07/26/24  1148 07/26/24  0707   WBC  --   --   --   --   --   --   --  9.8  --  10.5   HGB  --   --   --   --   --   --   --  13.7  --  13.5   MCV  --   --   --   --   --   --   --  98  --  99   PLT  --   --   --   --   --   --   --  231  --  215     --   --  138  --  137   < > 136   < > 135   POTASSIUM 3.7 3.8 4.3 3.1*   < > 3.1*   < > 3.3*   < > 2.8*   CHLORIDE 93*  --   --  95*  --  93*   < > 94*   < > 92*   CO2 32*  --   --  34*  --  34*   < > 32*   < > 32*   BUN 38.4*  --   --  38.2*  --  42.7*   < > 46.0*   < > 39.3*   CR 1.75*  --   --  1.63*  --  1.58*   < > 1.85*   < > 1.76*   ANIONGAP 11  --   --  9  --  10   < > 10   < > 11   ASHELY 9.3  --   --  9.1  --  9.1   < > 8.8   < > 8.9   *  --   --  113*  --  119*   < > 120*   < > 119*    < > = values in this interval not displayed.     Recent Labs   Lab Test 05/05/24  1515 05/05/24  1003 02/11/24  0848 02/11/24  0220 11/28/23  0623   NT-PROBNP, INPATIENT  --  1,202*  --  442 160   TROPONIN T HIGH SENSITIVITY 85* 90* 59* 57*  --      Recent Labs   Lab 07/31/24  0644 07/30/24  0651 07/29/24  0552 07/28/24  0604 07/27/24  0627 07/26/24  2345   * 113* 119* 123* 120* 124*     Recent Labs   Lab Test 02/25/19  0756 10/12/17  0814   A1C 5.7* 5.7       No results for input(s): \"INR\", " "\"[id number]\" in the last 168 hours.  Recent Labs   Lab 07/31/24  1222 07/27/24  0627 07/26/24  0707   WBC  --  9.8 10.5   LACT 0.8  --   --        MICRO:  CULTURES (INCLUDING BLOOD AND URINE):  No lab results found in last 7 days.    No results found for this or any previous visit (from the past 24 hour(s)).        Medications   All medications were reviewed. MAR.    Infusions:  Current Facility-Administered Medications   Medication Dose Route Frequency Provider Last Rate Last Admin    - MEDICATION INSTRUCTIONS -   Does not apply DOES NOT GO TO Courtney Blackmon APRN CNP        bumetanide (BUMEX) 0.25 mg/mL infusion  0.75 mg/hr Intravenous Continuous Wayne Harris MD 3 mL/hr at 07/31/24 0849 0.75 mg/hr at 07/31/24 0849    Continuing ACE inhibitor/ARB/ARNI from home medication list OR ACE inhibitor/ARB/ARNI order already placed during this visit   Does not apply DOES NOT GO TO Courtney Blackmon APRN CNP        Continuing beta blocker from home medication list OR beta blocker order already placed during this visit   Does not apply DOES NOT GO TO Courtney Blackmon APRN CNP        Patient is already receiving anticoagulation with heparin, enoxaparin (LOVENOX), warfarin (COUMADIN)  or other anticoagulant medication   Does not apply Continuous PRN Courtney Roy APRN CNP         Scheduled Medications:  Current Facility-Administered Medications   Medication Dose Route Frequency Provider Last Rate Last Admin    allopurinol (ZYLOPRIM) tablet 300 mg  300 mg Oral Daily Courtney Roy APRN CNP   300 mg at 07/31/24 0850    cetirizine (zyrTEC) tablet 10 mg  10 mg Oral QPM Courtney Roy APRN CNP   10 mg at 07/30/24 2218    finasteride (PROPECIA) tablet 1 mg  1 mg Oral Daily Courtney Roy APRN CNP   1 mg at 07/31/24 0850    losartan (COZAAR) tablet 50 mg  50 mg Oral Daily Courtney Roy APRN CNP   50 mg at 07/31/24 0857    miconazole (MICATIN) 2 % powder   Topical BID Masha Moy MD   Given at " 07/31/24 1258    polyethylene glycol (MIRALAX) Packet 17 g  17 g Oral Daily Courtney Roy APRN CNP   17 g at 07/31/24 0850    potassium chloride pushpa ER (KLOR-CON M20) CR tablet 20 mEq  20 mEq Oral Once Masha Moy MD        potassium chloride pushpa ER (KLOR-CON M20) CR tablet 60 mEq  60 mEq Oral TID Malika Blanco PA-C   60 mEq at 07/31/24 0851    predniSONE (DELTASONE) tablet 10 mg  10 mg Oral Daily Courtney Roy APRN CNP   10 mg at 07/31/24 0850    Followed by    [START ON 8/9/2024] predniSONE (DELTASONE) tablet 5 mg  5 mg Oral Daily Courtney Roy APRN CNP        psyllium (METAMUCIL/KONSYL) capsule 1-2 capsule  1-2 capsule Oral Daily Courtney Roy APRN CNP   2 capsule at 07/31/24 1048    rivaroxaban ANTICOAGULANT (XARELTO) tablet 20 mg  20 mg Oral Daily with supper Courtney Roy APRN CNP   20 mg at 07/30/24 1632    sodium chloride (PF) 0.9% PF flush 3 mL  3 mL Intracatheter Q8H Courtney Roy APRN CNP   3 mL at 07/27/24 1050    sotalol (BETAPACE) tablet 80 mg  80 mg Oral Q12H [facility] (08/20) Shanthi Wilkins APRN CNP   80 mg at 07/31/24 0850    [Held by provider] spironolactone (ALDACTONE) tablet 25 mg  25 mg Oral Daily Courtney Roy APRN CNP        sulfamethoxazole-trimethoprim (BACTRIM DS) 800-160 MG per tablet 1 tablet  1 tablet Oral Daily Courtney Roy APRN CNP   1 tablet at 07/31/24 0857    [Held by provider] torsemide (DEMADEX) tablet 80 mg  80 mg Oral Daily Courtney Roy APRN CNP         PRN Medications:  Current Facility-Administered Medications   Medication Dose Route Frequency Provider Last Rate Last Admin    - MEDICATION INSTRUCTIONS -   Does not apply DOES NOT GO TO Courtney Blackmon APRN CNP        acetaminophen (TYLENOL) tablet 650 mg  650 mg Oral Q4H PRN Courtney Roy APRN CNP        Or    acetaminophen (TYLENOL) Suppository 650 mg  650 mg Rectal Q4H PRN Courtney Roy APRN CNP        calcium carbonate (TUMS) chewable tablet 1,000 mg  1,000 mg Oral 4x  Daily PRN Courtney Roy APRN CNP        Continuing ACE inhibitor/ARB/ARNI from home medication list OR ACE inhibitor/ARB/ARNI order already placed during this visit   Does not apply DOES NOT GO TO Courtney Blackmon APRN CNP        Continuing beta blocker from home medication list OR beta blocker order already placed during this visit   Does not apply DOES NOT GO TO Courtney Blackmon APRN CNP        lidocaine (LMX4) cream   Topical Q1H PRCourtney Gresham APRN CNP        lidocaine 1 % 0.1-1 mL  0.1-1 mL Other Q1H PRN Courtney Roy APRN CNP        melatonin tablet 1 mg  1 mg Oral At Bedtime PRCourtney Gresham APRN CNP        ondansetron (ZOFRAN ODT) ODT tab 4 mg  4 mg Oral Q6H PRCourtney Gresham APRN CNP        Or    ondansetron (ZOFRAN) injection 4 mg  4 mg Intravenous Q6H PRCourtney Gresham APRN CNP        Patient is already receiving anticoagulation with heparin, enoxaparin (LOVENOX), warfarin (COUMADIN)  or other anticoagulant medication   Does not apply Continuous PRCourtney Gresham APRN CNP        prochlorperazine (COMPAZINE) injection 5 mg  5 mg Intravenous Q6H PRCourtney Gresham APRN CNP        Or    prochlorperazine (COMPAZINE) tablet 5 mg  5 mg Oral Q6H PRCourtney Gresham APRN CNP        Or    prochlorperazine (COMPAZINE) suppository 12.5 mg  12.5 mg Rectal Q12H PRCourtney Gresham APRN CNP        senna-docusate (SENOKOT-S/PERICOLACE) 8.6-50 MG per tablet 1 tablet  1 tablet Oral BID PRCourtney Gresham APRN CNP        Or    senna-docusate (SENOKOT-S/PERICOLACE) 8.6-50 MG per tablet 2 tablet  2 tablet Oral BID PRCourtney Gresham APRN CNP        sodium chloride (PF) 0.9% PF flush 3 mL  3 mL Intracatheter q1 min prCourtney Gresham APRN CNP

## 2024-07-31 NOTE — PROGRESS NOTES
[facility] Cardiology Progress Note  Date of Service: 07/31/2024  Primary Cardiologist: Dr. Barb LIMON Antoine is a 73 year old male admitted on 7/25/2024 with acute on chronic HFpEF, likely triggered by recurrent/persistent atrial fibrillation. Diuresis with IV bumex @ 0.5 mg/hr and intermittent diuril.     Interim Hx: Bumex rate increased to 0.75 yesterday. Today, pt continues to feel significantly better today. Edema mild. Renal fn starting to increase again.    Assessment:  Acute HFpEF  -- EDW is ~250# (? I suspect this is much lower than his actually dry wt) - currently 297 lbs. Net -17L.  Persistent atrial fibrillation with multiple attempts at rhythm control, cardioversions, and ablation attempts, with pneumonitis 2/2 amiodarone  -- [id number] Score 4 (age, CHF, HTN, aortic plaque)  -- Anticoagulated with Xarelto   -- EP to consider AARx/DCCV once fluid status is under better control.   Hypokalemia   ROBIN/CKD   Moderate aortic stenosis   History of abnormal nuclear stress test during admission 5/2024  -- Lexiscan stress test 5/2024- abnormal, small area of ischemia in the apical segments of the left ventricle, small area of nontransmural infarction in the basal inferolateral segments of the left ventricle associated with a mild degree of iva-infarct ischemia involving basal to mid inferior wall   -- This was thought to possibly be a false positive finding due to body habitus, but recommendation was a left and right heart cath when patients pulmonary status returned to baseline    -- No angina.  Can consider outpatient LH cath per primary cardiologist if warranted, once renal fn improves further.  Amiodarone pneumonitis, on steroids  HTN   BOWEN, compliant with CPAP   Myocardial scar noted on cardiac MRI   History of alcohol dependence     Plan:   RHC today.   -- OK'd to perform on Eliquis by Dr. Lazo (no interruption given need for upcoming cardioversion).    Plan was  formulated under direction of Dr. Harris.   Malika Blanco PA-C  [facility]  Pager: [phone]  Text Page  (7:30am - 4pm M-F)   __________________________________________________________________________  Physical Exam   Temp: 97.8  F (36.6  C) Temp src: Oral BP: 126/89 Pulse: 76   Resp: 16 SpO2: 96 % O2 Device: None (Room air)    Vitals:    07/29/24 0400 07/30/24 0430 07/31/24 0507   Weight: 136.8 kg (301 lb 9.6 oz) 136.1 kg (300 lb) 135.1 kg (297 lb 12.8 oz)       GENERAL:  The patient is in no apparent distress.   NECK: CVP appears normal, no masses or thyromegaly.  PULMONARY:  There is a normal respiratory effort. Clear lungs to auscultation bilaterally.   CARDIOVASCULAR:  Irregularly irregular, variable S1, no m/r/g.  GI:  Non tender abdomen with normoactive bowel sounds and no hepatosplenomegaly. There are no masses palpable.   EXTREMITIES:  1+ pitting edema to knees bilaterally, wraps in place  VASCULAR: 2+ Pulses bilaterally in upper and lower extremities.    Medications   Current Facility-Administered Medications   Medication Dose Route Frequency Provider Last Rate Last Admin    - MEDICATION INSTRUCTIONS -   Does not apply DOES NOT GO TO Courtney Blackmon APRN CNP        bumetanide (BUMEX) 0.25 mg/mL infusion  0.75 mg/hr Intravenous Continuous Wayne Harris MD 3 mL/hr at 07/31/24 0134 0.75 mg/hr at 07/31/24 0134    Continuing ACE inhibitor/ARB/ARNI from home medication list OR ACE inhibitor/ARB/ARNI order already placed during this visit   Does not apply DOES NOT GO TO Courtney Blackmon APRN CNP        Continuing beta blocker from home medication list OR beta blocker order already placed during this visit   Does not apply DOES NOT GO TO Courtney Blackmon APRN CNP        Patient is already receiving anticoagulation with heparin, enoxaparin (LOVENOX), warfarin (COUMADIN)  or other anticoagulant medication   Does not apply Continuous PRN Courtney Roy APRN CNP          Current Facility-Administered Medications   Medication Dose Route Frequency Provider Last Rate Last Admin    allopurinol (ZYLOPRIM) tablet 300 mg  300 mg Oral Daily Courtney Roy Rebeka, APRN CNP   300 mg at 07/30/24 0847    cetirizine (zyrTEC) tablet 10 mg  10 mg Oral QPM Courtney Roy APRN CNP   10 mg at 07/30/24 2218    finasteride (PROPECIA) tablet 1 mg  1 mg Oral Daily Courtney Roy, APRN CNP   1 mg at 07/30/24 0848    [Held by provider] losartan (COZAAR) tablet 50 mg  50 mg Oral Daily Courtney Roy, APRN CNP   50 mg at 07/27/24 0829    magnesium oxide (MAG-OX) tablet 400 mg  400 mg Oral Q4H Masha Moy MD        polyethylene glycol (MIRALAX) Packet 17 g  17 g Oral Daily Courtney Roy, APRN CNP   17 g at 07/30/24 0848    potassium chloride pushpa ER (KLOR-CON M20) CR tablet 20 mEq  20 mEq Oral Once Masha Moy MD        potassium chloride pushpa ER (KLOR-CON M20) CR tablet 60 mEq  60 mEq Oral TID Malika Blanco PA-C   60 mEq at 07/30/24 2218    predniSONE (DELTASONE) tablet 10 mg  10 mg Oral Daily Courtney Roy, APRN CNP   10 mg at 07/30/24 0847    Followed by    [START ON 8/9/2024] predniSONE (DELTASONE) tablet 5 mg  5 mg Oral Daily Courtney Roy APRN CNP        psyllium (METAMUCIL/KONSYL) capsule 1-2 capsule  1-2 capsule Oral Daily Courtney Roy APRN CNP   2 capsule at 07/30/24 0849    rivaroxaban ANTICOAGULANT (XARELTO) tablet 20 mg  20 mg Oral Daily with supper Courtney Roy APRN CNP   20 mg at 07/30/24 1632    sodium chloride (PF) 0.9% PF flush 3 mL  3 mL Intracatheter Q8H Courtney Roy APRN CNP   3 mL at 07/27/24 1050    sotalol (BETAPACE) tablet 80 mg  80 mg Oral Q12H [facility] (08/20) Shanthi Wilkins APRN CNP        [Held by provider] spironolactone (ALDACTONE) tablet 25 mg  25 mg Oral Daily Courtney Roy APRN CNP        sulfamethoxazole-trimethoprim (BACTRIM DS) 800-160 MG per tablet 1 tablet  1 tablet Oral Daily Courtney Roy APRN CNP   1 tablet at 07/30/24  0847    [Held by provider] torsemide (DEMADEX) tablet 80 mg  80 mg Oral Daily Courtney Roy, APRN CNP           Data   Most Recent 3 CBC's:  Recent Labs   Lab Test 07/27/24  0627 07/26/24  0707 05/16/24  1052   WBC 9.8 10.5 16.4*   HGB 13.7 13.5 15.0   MCV 98 99 98    215 372     Most Recent 3 BMP's:  Recent Labs   Lab Test 07/31/24  0644 07/30/24  1524 07/30/24  1417 07/30/24  0651 07/29/24  1457 07/29/24  0552     --   --  138  --  137   POTASSIUM 3.7 3.8 4.3 3.1*   < > 3.1*   CHLORIDE 93*  --   --  95*  --  93*   CO2 32*  --   --  34*  --  34*   BUN 38.4*  --   --  38.2*  --  42.7*   CR 1.75*  --   --  1.63*  --  1.58*   ANIONGAP 11  --   --  9  --  10   ASHELY 9.3  --   --  9.1  --  9.1   *  --   --  113*  --  119*    < > = values in this interval not displayed.

## 2024-07-31 NOTE — PROGRESS NOTES
EP Progress Note          Assessment and Plan:   Mike Schultz is a 73 year old male with a PMH significant for persistent atrial fibrillation, HFpEF, moderate aortic stenosis, hypertension, BOWEN, alcohol use disorder, Bell's palsy, lung nodules, and persistent atrial fibrillation on Xarelto.  Patient was admitted for diuresis for his HFpEF.  EP was consulted to assist with arrhythmia management.    Persistent atrial fibrillation with history of intolerance on amiodarone (pneumonitis discontinued 11/2023)  Initiated on sotalol 120 mg every 12 hours  On Xarelto  without interruption  Baseline creatinine 1.5, however with diuresis in the past up to 2.09 earlier this month. Creat up slightly this am  Estimated Creatinine Clearance: 51.3 mL/min (A) (based on SCr of 1.75 mg/dL (H)).    EKG this morning atrial fibrillation 75 bpm, QT/QTc 512/571 ms (440/519 ms)  Plan for cardioversion tomorrow.  Risks and benefits reviewed. Risks for cardioversion (DCCV) were reviewed  including but not limited to; use of anesthesia, surface burns, [id number] arrhythmias requiring further treatment, discomfort and stroke.  The patient voiced understanding and consent form was signed. Patient agrees to proceed.    2. Acute on chronic HFpEF  EF 55%  Admission with exacerbation thought to be secondary to atrial fibrillation  Admission weight 310 pounds, weight this morning 297 pounds  I/O -17 liters    3.  Moderate aortic stenosis  Vmax 3.7 m/s, mean gradient 35 mmHg, MARILYN 1.1cm2, DI 0.26. SVi 44 cc/m2. There is mild (1+) aortic regurgitation    4.  Hypertension  Grade 3 diastolic dysfunction noted on echo    5.  Myocardial scar on cardiac MRI  Lexiscan 5/9/2024: Small area of apical ischemia.  And a small area of nontransmural infarction in the basal inferior lateral segments with iva-infarct ischemia (possible false positive for body habitus)    6.  ROBIN/CKD stage III  Creat up slightly this am 1.75 (1.63)      Patient n.p.o. this  morning for right heart cath (Xarelto will not be held for the procedure) and cardioversion tomorrow.  Consent signed and order placed.    Will decrease sotalol to 80 mg bid r/t elevated creatinine and prolonged. QT.  Repeat EKG is ordered for 12 PM today to reassess QT      Shanthi Wilkins NP, AYAN PAYAN  Beeper [phone]              Interval History:   Overall, patient feeling better today.  States that he has no shortness of breath with exertion now.  Legs also feel lighter.  Feels like he is still retaining fluid per patient.  Blood pressure is stable.  Weight slowly trending down.  Telemetry shows A-fib with CVR         Medications:     Current Facility-Administered Medications   Medication Dose Route Frequency Provider Last Rate Last Admin    allopurinol (ZYLOPRIM) tablet 300 mg  300 mg Oral Daily Courtney Roy APRN CNP   300 mg at 07/30/24 0847    cetirizine (zyrTEC) tablet 10 mg  10 mg Oral QPM Courtney Roy APRN CNP   10 mg at 07/30/24 2218    finasteride (PROPECIA) tablet 1 mg  1 mg Oral Daily Courtney Roy APRN CNP   1 mg at 07/30/24 0848    [Held by provider] losartan (COZAAR) tablet 50 mg  50 mg Oral Daily Courtney Roy APRN CNP   50 mg at 07/27/24 0829    magnesium oxide (MAG-OX) tablet 400 mg  400 mg Oral Q4H Masha Moy MD        polyethylene glycol (MIRALAX) Packet 17 g  17 g Oral Daily Courtney Roy APRN CNP   17 g at 07/30/24 0848    potassium chloride pushpa ER (KLOR-CON M20) CR tablet 20 mEq  20 mEq Oral Once Masha Moy MD        potassium chloride pushpa ER (KLOR-CON M20) CR tablet 60 mEq  60 mEq Oral TID Malika Blanco PA-C   60 mEq at 07/30/24 2218    predniSONE (DELTASONE) tablet 10 mg  10 mg Oral Daily Courtney Roy APRN CNP   10 mg at 07/30/24 0847    Followed by    [START ON 8/9/2024] predniSONE (DELTASONE) tablet 5 mg  5 mg Oral Daily Courtney Roy APRN CNP        psyllium (METAMUCIL/KONSYL) capsule 1-2 capsule  1-2 capsule Oral Daily Manuela,  AYAN Girard CNP   2 capsule at 24 0849    rivaroxaban ANTICOAGULANT (XARELTO) tablet 20 mg  20 mg Oral Daily with supper Courtney Roy APRN CNP   20 mg at 24 1632    sodium chloride (PF) 0.9% PF flush 3 mL  3 mL Intracatheter Q8H Courtney Roy APRN CNP   3 mL at 24 1050    sotalol (BETAPACE) tablet 120 mg  120 mg Oral Q12H ONEAL () Svetlana Cho APRN CNP   120 mg at 24 2218    [Held by provider] spironolactone (ALDACTONE) tablet 25 mg  25 mg Oral Daily Courtney Roy APRN CNP        sulfamethoxazole-trimethoprim (BACTRIM DS) 800-160 MG per tablet 1 tablet  1 tablet Oral Daily Courtney Roy APRN CNP   1 tablet at 24 0847    [Held by provider] torsemide (DEMADEX) tablet 80 mg  80 mg Oral Daily Courtney Roy APRN CNP                 Review of Systems: If done, described below  The Review of Systems is negative other than noted in the HPI             Physical Exam:   Blood pressure 126/89, pulse 76, temperature 97.8  F (36.6  C), temperature source Oral, resp. rate 16, weight 135.1 kg (297 lb 12.8 oz), SpO2 96%.      Vital Sign Ranges  Temperature Temp  Av.7  F (36.5  C)  Min: 97.5  F (36.4  C)  Max: 98  F (36.7  C)   Blood pressure Systolic (24hrs), Av , Min:93 , Max:135        Diastolic (24hrs), Av, Min:76, Max:95      Pulse Pulse  Av.1  Min: 71  Max: 91   Respirations Resp  Av.7  Min: 16  Max: 18   Pulse oximetry SpO2  Av.6 %  Min: 92 %  Max: 96 %         Intake/Output Summary (Last 24 hours) at 2024 0808  Last data filed at 2024 0726  Gross per 24 hour   Intake 1640 ml   Output 4975 ml   Net -3335 ml       Constitutional:   in no apparent distress     Lungs:   symmetric, clear to auscultation     Cardiovascular:   irregularly irregular rhythm, no murmur     Extremities and Back:   Edema 1+               Data:     Lab Results   Component Value Date    WBC 9.8 2024    HGB 13.7 2024    HCT 41.6 2024      07/27/2024     07/31/2024    POTASSIUM 3.7 07/31/2024    CHLORIDE 93 (L) 07/31/2024    CO2 32 (H) 07/31/2024    BUN 38.4 (H) 07/31/2024    CR 1.75 (H) 07/31/2024     (H) 07/31/2024    SED 28 (H) 05/13/2024    DD 0.4 01/07/2010    NTBNPI 1,202 (H) 05/05/2024    NTBNP 274 11/24/2023    AST 34 05/05/2024    ALT 40 05/05/2024    ALKPHOS 92 05/05/2024    BILITOTAL 1.3 (H) 05/05/2024    INR 2.10 (H) 11/26/2023

## 2024-07-31 NOTE — PROGRESS NOTES
CLINICAL NUTRITION SERVICES - BRIEF NOTE    - Nutrition risk chart review for length of stay (day 6). Per review, pt has had 100% intakes documented per nursing flowsheets all of admit. He has been ordering adequate 2-3 meals/day. Does have trauma wound after hitting his shin with his car door, however no pressure component.   - No notable weight loss in the past year.  Wt Readings from Last 30 Encounters:   07/31/24 135.1 kg (297 lb 12.8 oz)   07/25/24 142 kg (313 lb 1.6 oz)   07/24/24 140.9 kg (310 lb 10.1 oz)   05/24/24 127.5 kg (281 lb)   05/22/24 125.2 kg (276 lb)   05/21/24 124.1 kg (273 lb 8 oz)   05/16/24 126.1 kg (278 lb 1.6 oz)   05/02/24 136.1 kg (300 lb)   04/23/24 137.4 kg (303 lb)   03/29/24 135.2 kg (298 lb)   03/21/24 135.5 kg (298 lb 12.8 oz)   03/12/24 133.4 kg (294 lb 3.2 oz)   03/11/24 132.5 kg (292 lb)   03/07/24 132.5 kg (292 lb)   03/01/24 132.6 kg (292 lb 4.8 oz)   02/26/24 133.8 kg (295 lb)   02/23/24 134.9 kg (297 lb 6.4 oz)   02/23/24 135.9 kg (299 lb 8 oz)   02/13/24 129.3 kg (285 lb)   12/18/23 126.6 kg (279 lb)   12/01/23 123.7 kg (272 lb 12.8 oz)   10/26/23 126.7 kg (279 lb 4.8 oz)   09/14/23 129.3 kg (285 lb)   09/13/23 129.3 kg (285 lb)   09/08/23 129.7 kg (286 lb)   07/27/23 126.6 kg (279 lb 3.2 oz)   04/20/23 127 kg (280 lb)   02/09/23 122.5 kg (270 lb)   01/20/23 122.7 kg (270 lb 9.6 oz)   12/17/22 125.7 kg (277 lb 3.2 oz)     MONITORING AND EVALUATION:  Will continue to follow every 7-10 days per protocol    Ally Rodriguez RD, LD  Clinical Dietitian - [facility]

## 2024-07-31 NOTE — PLAN OF CARE
Goal Outcome Evaluation:    VSS. Monitor shows Atrial fib with CVR. Pt. Is alert and oriented. Denies pain. Bumex drip at 0.75 mg/hr. Pt. Had right heart cath. Left groin site stable. Plan for cardioversion tomorrow. NPO after midnight.

## 2024-07-31 NOTE — PROGRESS NOTES
Repeat EKG completed on lower dose of sotalol at 80 mg twice daily.  QT/QTc 504/559 ms slight improvement from this morning, appears stable.  Awaiting cardioversion tomorrow to reassess QT.   On sulfamethoxazole-trimethoprim prophylaxis for pneumonitis. Hospitalist monitoring creat closely. Could be causing some QT prolongation.  No changes at this time. EKG in the am before his sotalol dose.    Shanthi Wilkins NP, APRN CNP

## 2024-07-31 NOTE — PLAN OF CARE
Goal Outcome Evaluation:         Patient is here for CHF exacerbation. Telemetry Afib w/ CVR. PIV to right hand infusing Bumex drip at 0.75 mg/hour. A&Ox4. VSS on RA -- denies pain. Up independently. Had right heart cath procedure done today to left groin, with cardioversion planned for tomorrow -- to be NPO at midnight. Wound to right shin present -- pink, moist, and [place].

## 2024-08-01 ENCOUNTER — APPOINTMENT (OUTPATIENT)
Dept: OCCUPATIONAL THERAPY | Facility: CLINIC | Age: 74
DRG: 286 | End: 2024-08-01
Attending: STUDENT IN AN ORGANIZED HEALTH CARE EDUCATION/TRAINING PROGRAM
Payer: COMMERCIAL

## 2024-08-01 ENCOUNTER — ANESTHESIA EVENT (OUTPATIENT)
Dept: SURGERY | Facility: CLINIC | Age: 74
DRG: 286 | End: 2024-08-01
Payer: COMMERCIAL

## 2024-08-01 ENCOUNTER — ANESTHESIA (OUTPATIENT)
Dept: SURGERY | Facility: CLINIC | Age: 74
DRG: 286 | End: 2024-08-01
Payer: COMMERCIAL

## 2024-08-01 LAB
ANION GAP SERPL CALCULATED.3IONS-SCNC: 9 MMOL/L (ref 7–15)
ATRIAL RATE - MUSE: 104 BPM
ATRIAL RATE - MUSE: 55 BPM
BUN SERPL-MCNC: 42.2 MG/DL (ref 8–23)
CALCIUM SERPL-MCNC: 8.8 MG/DL (ref 8.8–10.4)
CHLORIDE SERPL-SCNC: 96 MMOL/L (ref 98–107)
CREAT SERPL-MCNC: 1.78 MG/DL (ref 0.67–1.17)
DIASTOLIC BLOOD PRESSURE - MUSE: NORMAL MMHG
DIASTOLIC BLOOD PRESSURE - MUSE: NORMAL MMHG
EGFRCR SERPLBLD CKD-EPI 2021: 40 ML/MIN/1.73M2
GLUCOSE SERPL-MCNC: 113 MG/DL (ref 70–99)
HCO3 SERPL-SCNC: 34 MMOL/L (ref 22–29)
INR PPP: 2.47 (ref 0.85–1.15)
INTERPRETATION ECG - MUSE: NORMAL
INTERPRETATION ECG - MUSE: NORMAL
MAGNESIUM SERPL-MCNC: 1.9 MG/DL (ref 1.7–2.3)
P AXIS - MUSE: 35 DEGREES
P AXIS - MUSE: NORMAL DEGREES
PHOSPHATE SERPL-MCNC: 4.2 MG/DL (ref 2.5–4.5)
POTASSIUM SERPL-SCNC: 3.5 MMOL/L (ref 3.4–5.3)
PR INTERVAL - MUSE: 198 MS
PR INTERVAL - MUSE: NORMAL MS
QRS DURATION - MUSE: 80 MS
QRS DURATION - MUSE: 88 MS
QT - MUSE: 496 MS
QT - MUSE: 610 MS
QTC - MUSE: 558 MS
QTC - MUSE: 583 MS
R AXIS - MUSE: 38 DEGREES
R AXIS - MUSE: 65 DEGREES
SODIUM SERPL-SCNC: 139 MMOL/L (ref 135–145)
SYSTOLIC BLOOD PRESSURE - MUSE: NORMAL MMHG
SYSTOLIC BLOOD PRESSURE - MUSE: NORMAL MMHG
T AXIS - MUSE: 91 DEGREES
T AXIS - MUSE: 99 DEGREES
VENTRICULAR RATE- MUSE: 55 BPM
VENTRICULAR RATE- MUSE: 76 BPM

## 2024-08-01 PROCEDURE — 250N000013 HC RX MED GY IP 250 OP 250 PS 637: Performed by: NURSE PRACTITIONER

## 2024-08-01 PROCEDURE — 250N000013 HC RX MED GY IP 250 OP 250 PS 637: Performed by: INTERNAL MEDICINE

## 2024-08-01 PROCEDURE — 250N000012 HC RX MED GY IP 250 OP 636 PS 637: Performed by: NURSE PRACTITIONER

## 2024-08-01 PROCEDURE — 92960 CARDIOVERSION ELECTRIC EXT: CPT | Performed by: INTERNAL MEDICINE

## 2024-08-01 PROCEDURE — 99100 ANES PT EXTEME AGE<1 YR&>70: CPT | Performed by: NURSE ANESTHETIST, CERTIFIED REGISTERED

## 2024-08-01 PROCEDURE — 250N000011 HC RX IP 250 OP 636: Performed by: ANESTHESIOLOGY

## 2024-08-01 PROCEDURE — 93005 ELECTROCARDIOGRAM TRACING: CPT

## 2024-08-01 PROCEDURE — 258N000003 HC RX IP 258 OP 636: Performed by: INTERNAL MEDICINE

## 2024-08-01 PROCEDURE — 92960 CARDIOVERSION ELECTRIC EXT: CPT | Performed by: ANESTHESIOLOGY

## 2024-08-01 PROCEDURE — 92960 CARDIOVERSION ELECTRIC EXT: CPT

## 2024-08-01 PROCEDURE — 99233 SBSQ HOSP IP/OBS HIGH 50: CPT | Mod: 25 | Performed by: PHYSICIAN ASSISTANT

## 2024-08-01 PROCEDURE — G0463 HOSPITAL OUTPT CLINIC VISIT: HCPCS | Mod: 25

## 2024-08-01 PROCEDURE — 210N000002 HC R&B HEART CARE

## 2024-08-01 PROCEDURE — 999N000010 HC STATISTIC ANES STAT CODE-CRNA PER MINUTE

## 2024-08-01 PROCEDURE — 36415 COLL VENOUS BLD VENIPUNCTURE: CPT | Performed by: INTERNAL MEDICINE

## 2024-08-01 PROCEDURE — 83735 ASSAY OF MAGNESIUM: CPT | Performed by: HOSPITALIST

## 2024-08-01 PROCEDURE — 93010 ELECTROCARDIOGRAM REPORT: CPT | Mod: XU | Performed by: INTERNAL MEDICINE

## 2024-08-01 PROCEDURE — 92960 CARDIOVERSION ELECTRIC EXT: CPT | Performed by: NURSE ANESTHETIST, CERTIFIED REGISTERED

## 2024-08-01 PROCEDURE — 99232 SBSQ HOSP IP/OBS MODERATE 35: CPT | Performed by: INTERNAL MEDICINE

## 2024-08-01 PROCEDURE — 5A2204Z RESTORATION OF CARDIAC RHYTHM, SINGLE: ICD-10-PCS | Performed by: INTERNAL MEDICINE

## 2024-08-01 PROCEDURE — 999N000184 HC STATISTIC TELEMETRY

## 2024-08-01 PROCEDURE — 85610 PROTHROMBIN TIME: CPT | Performed by: INTERNAL MEDICINE

## 2024-08-01 PROCEDURE — 370N000017 HC ANESTHESIA TECHNICAL FEE, PER MIN

## 2024-08-01 PROCEDURE — 97110 THERAPEUTIC EXERCISES: CPT | Mod: GO

## 2024-08-01 PROCEDURE — 80048 BASIC METABOLIC PNL TOTAL CA: CPT | Performed by: HOSPITALIST

## 2024-08-01 PROCEDURE — 250N000011 HC RX IP 250 OP 636: Performed by: INTERNAL MEDICINE

## 2024-08-01 PROCEDURE — 258N000003 HC RX IP 258 OP 636: Performed by: ANESTHESIOLOGY

## 2024-08-01 PROCEDURE — 84100 ASSAY OF PHOSPHORUS: CPT | Performed by: HOSPITALIST

## 2024-08-01 PROCEDURE — 250N000013 HC RX MED GY IP 250 OP 250 PS 637: Performed by: PHYSICIAN ASSISTANT

## 2024-08-01 RX ORDER — MAGNESIUM SULFATE HEPTAHYDRATE 40 MG/ML
2 INJECTION, SOLUTION INTRAVENOUS
Status: DISCONTINUED | OUTPATIENT
Start: 2024-08-01 | End: 2024-08-01

## 2024-08-01 RX ORDER — PROPOFOL 10 MG/ML
INJECTION, EMULSION INTRAVENOUS PRN
Status: DISCONTINUED | OUTPATIENT
Start: 2024-08-01 | End: 2024-08-01

## 2024-08-01 RX ORDER — POTASSIUM CHLORIDE 1500 MG/1
20 TABLET, EXTENDED RELEASE ORAL
Status: DISCONTINUED | OUTPATIENT
Start: 2024-08-01 | End: 2024-08-01

## 2024-08-01 RX ORDER — POTASSIUM CHLORIDE 1500 MG/1
40 TABLET, EXTENDED RELEASE ORAL
Status: DISCONTINUED | OUTPATIENT
Start: 2024-08-01 | End: 2024-08-09 | Stop reason: HOSPADM

## 2024-08-01 RX ORDER — BENZOCAINE/MENTHOL 6 MG-10 MG
LOZENGE MUCOUS MEMBRANE 3 TIMES DAILY PRN
Status: ACTIVE | OUTPATIENT
Start: 2024-08-01 | End: 2024-08-03

## 2024-08-01 RX ORDER — LIDOCAINE 40 MG/G
CREAM TOPICAL
Status: DISCONTINUED | OUTPATIENT
Start: 2024-08-01 | End: 2024-08-01

## 2024-08-01 RX ORDER — MAGNESIUM SULFATE HEPTAHYDRATE 40 MG/ML
2 INJECTION, SOLUTION INTRAVENOUS
Status: DISCONTINUED | OUTPATIENT
Start: 2024-08-01 | End: 2024-08-09 | Stop reason: HOSPADM

## 2024-08-01 RX ORDER — TORSEMIDE 20 MG/1
60 TABLET ORAL
Status: DISCONTINUED | OUTPATIENT
Start: 2024-08-02 | End: 2024-08-03

## 2024-08-01 RX ORDER — POTASSIUM CHLORIDE 7.45 MG/ML
10 INJECTION INTRAVENOUS ONCE
Status: COMPLETED | OUTPATIENT
Start: 2024-08-01 | End: 2024-08-01

## 2024-08-01 RX ORDER — MAGNESIUM OXIDE 400 MG/1
400 TABLET ORAL EVERY 4 HOURS
Status: COMPLETED | OUTPATIENT
Start: 2024-08-01 | End: 2024-08-01

## 2024-08-01 RX ORDER — POTASSIUM CHLORIDE 1500 MG/1
20 TABLET, EXTENDED RELEASE ORAL
Status: DISCONTINUED | OUTPATIENT
Start: 2024-08-01 | End: 2024-08-09 | Stop reason: HOSPADM

## 2024-08-01 RX ORDER — POTASSIUM CHLORIDE 1500 MG/1
20 TABLET, EXTENDED RELEASE ORAL ONCE
Status: COMPLETED | OUTPATIENT
Start: 2024-08-01 | End: 2024-08-01

## 2024-08-01 RX ORDER — SODIUM CHLORIDE 9 MG/ML
INJECTION, SOLUTION INTRAVENOUS CONTINUOUS
Status: DISCONTINUED | OUTPATIENT
Start: 2024-08-01 | End: 2024-08-06

## 2024-08-01 RX ADMIN — CETIRIZINE HYDROCHLORIDE 10 MG: 10 TABLET, FILM COATED ORAL at 21:15

## 2024-08-01 RX ADMIN — PHENYLEPHRINE HYDROCHLORIDE 100 MCG: 10 INJECTION INTRAVENOUS at 09:27

## 2024-08-01 RX ADMIN — PHENYLEPHRINE HYDROCHLORIDE 200 MCG: 10 INJECTION INTRAVENOUS at 09:17

## 2024-08-01 RX ADMIN — SOTALOL HYDROCHLORIDE 80 MG: 80 TABLET ORAL at 08:28

## 2024-08-01 RX ADMIN — POLYETHYLENE GLYCOL 3350 17 G: 17 POWDER, FOR SOLUTION ORAL at 13:45

## 2024-08-01 RX ADMIN — Medication 2 CAPSULE: at 13:45

## 2024-08-01 RX ADMIN — POTASSIUM CHLORIDE 60 MEQ: 1500 TABLET, EXTENDED RELEASE ORAL at 18:12

## 2024-08-01 RX ADMIN — SODIUM CHLORIDE: 9 INJECTION, SOLUTION INTRAVENOUS at 08:57

## 2024-08-01 RX ADMIN — LOSARTAN POTASSIUM 50 MG: 50 TABLET, FILM COATED ORAL at 13:44

## 2024-08-01 RX ADMIN — ALLOPURINOL 300 MG: 300 TABLET ORAL at 13:44

## 2024-08-01 RX ADMIN — PROPOFOL 120 MG: 10 INJECTION, EMULSION INTRAVENOUS at 09:17

## 2024-08-01 RX ADMIN — SOTALOL HYDROCHLORIDE 80 MG: 80 TABLET ORAL at 23:10

## 2024-08-01 RX ADMIN — SOTALOL HYDROCHLORIDE 80 MG: 80 TABLET ORAL at 21:15

## 2024-08-01 RX ADMIN — MAGNESIUM SULFATE HEPTAHYDRATE 1 G: 500 INJECTION, SOLUTION INTRAMUSCULAR; INTRAVENOUS at 13:05

## 2024-08-01 RX ADMIN — Medication 400 MG: at 13:44

## 2024-08-01 RX ADMIN — POTASSIUM CHLORIDE 20 MEQ: 1500 TABLET, EXTENDED RELEASE ORAL at 08:19

## 2024-08-01 RX ADMIN — POTASSIUM CHLORIDE 10 MEQ: 7.46 INJECTION, SOLUTION INTRAVENOUS at 11:12

## 2024-08-01 RX ADMIN — POTASSIUM CHLORIDE 60 MEQ: 1500 TABLET, EXTENDED RELEASE ORAL at 21:15

## 2024-08-01 RX ADMIN — Medication 400 MG: at 08:20

## 2024-08-01 RX ADMIN — RIVAROXABAN 20 MG: 20 TABLET, FILM COATED ORAL at 18:11

## 2024-08-01 RX ADMIN — POTASSIUM CHLORIDE 60 MEQ: 1500 TABLET, EXTENDED RELEASE ORAL at 13:44

## 2024-08-01 RX ADMIN — PREDNISONE 10 MG: 10 TABLET ORAL at 13:44

## 2024-08-01 RX ADMIN — FINASTERIDE 1 MG: 1 TABLET, FILM COATED ORAL at 13:45

## 2024-08-01 ASSESSMENT — ACTIVITIES OF DAILY LIVING (ADL)
ADLS_ACUITY_SCORE: 24

## 2024-08-01 ASSESSMENT — ENCOUNTER SYMPTOMS: DYSRHYTHMIAS: 1

## 2024-08-01 ASSESSMENT — LIFESTYLE VARIABLES: TOBACCO_USE: 1

## 2024-08-01 NOTE — PROCEDURES
[facility]    Procedure: EP Cardioversion External    Date/Time: 8/1/2024 9:26 AM    Performed by: Ajay Almazan MD  Authorized by: Shanthi Wilkins APRN [facility]      UNIVERSAL PROTOCOL   Site Marked: Yes  Prior Images Obtained and Reviewed:  Yes  Required items: Required blood products, implants, devices and special equipment available    Patient identity confirmed:  Verbally with patient  Patient was reevaluated immediately before administering moderate or deep sedation or anesthesia  Confirmation Checklist:  Patient's identity using two indicators  Time out: Immediately prior to the procedure a time out was called    Universal Protocol: the Joint Commission Universal Protocol was followed    Preparation: Patient was prepped and draped in usual sterile fashion       ANESTHESIA    Anesthesia was administered and monitored by anesthesiology.  See anesthesia documentation for details.    SEDATION  Patient Sedated: Yes    Vital signs: Vital signs monitored during sedation      PROCEDURE DETAILS  Cardioversion basis: elective  Indications: failure of anti-arrhythmic medications  Pre-procedure rhythm: atrial fibrillation  Patient position: patient was placed in a supine position  Chest area: chest area exposed  Electrodes: pads  Electrodes placed: anterior-posterior  Number of attempts: 1    Details of Attempts:  DC Cardioversion Procedure Note:    Informed consent obtained.  Pads placed in AP position.  Anesthesia used, please see their documentation.    Synchronized, biphasic 200J shock x 1 delivered with manual compression was successful in converting atrial fibrillation (HR 80) to sinus bradycardia (HR 50) without pauses.    No apparent complications.   Post-procedure rhythm: normal sinus rhythm  Complications: no complications      PROCEDURE    Patient Tolerance:  Patient tolerated the procedure well with no immediate complications

## 2024-08-01 NOTE — PLAN OF CARE
Pleasant gentlemen. Pt aox4, ind, RA and full code. Tele afib cvr. Pt denies CP and SOB. Lasix gtt 0.75mg/hr. RHC completed today. L groin, WDL. Sotalol reduced to 80mg. 2L fluid rx. R leg wound- followed by WOC. K/Mag/Phos protocol.   Plan: NPO at MN for cardioversion.

## 2024-08-01 NOTE — PROGRESS NOTES
[facility]    Internal Medicine Hospitalist Progress Note  08/01/2024  I evaluated patient on the above date.    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    Acute on chronic diastolic CHF exacerbation (HFpEF): Improved  Hypertension (benign essential).  AS, moderate.  [PTA: losartan 50 mg daily; metoprolol 12.5 mg BID; spironolactone 25 mg daily; torsemide 80 mg daily.]  * Echo 5/2024 showed LVEF 55%, no obvious regional wall motion abnormalities; RV borderline dilated with normal systolic function; moderate AS, mild AR.  * On admit, noted primary symptoms of significant weight gain, up to 310 pounds, diffuse edema, dyspnea. Felt to be multifactorial etiology including ongoing corticosteroid use as well as PAF.   * Wt 310 lbs on admit (notes baseline wt around 270 lbs). CXR showed mild bibasilar atelectasis/scar, no pleural effusion, normal pulmonary vascularity. Given bumetanide and chlorothiazide IV dose and started on bumetanide gtt on admit. Cardiology consulted on admit.     -He was on bumetanide infusion till 8/1/2024, received Diuril on 7/27, no re-dosing on 7/28 due to bump in creatinine.  Now started on oral torsemide 60 mg twice daily and IV Bumex discontinued on 8/1/2024.  - Continued losartan and metoprolol.  Losartan placed on hold on 7/28 due to worsening kidney function.  - Hold PTA spironolactone and torsemide for now.  -Diuresing well.  Right heart cath done on 7/31/2024,.  Shows moderately  elevated right heart filling pressures.  Normal left heart filling pressure.  Wedge pressure 15 mmHg.      EP consulted-see below.  - Continue 2 g sodium diet and 2 L fluid restriction.  - Continue to monitor i/o's, daily wts.  - Appreciate Cardiology help.  - AF management as noted.    Persistent A-fib, felt to be contributing to acute CHF.  Status post DC cardioversion: To normal sinus rhythm  * Follows with Dr. Day. H/o multiple antiarrhythmics including flecainide which he failed and amiodarone resulted in pneumonitis. H/o multiple ablations and cardioversion which have subsequently failed.   * Cardiology consulted on admit.  - Continue to monitor on telemetry.  - Continue PTA metoprolol and rivaroxaban.  -EP consulted on 7/30: Started on sotalol 120 Mg twice daily .  Dose was decreased to 80 Mg twice daily on 7/31 given rising creatinine and prolonged QT on EKG.   He is not successful DC cardioversion to normal sinus rhythm.  With sinus bradycardia initially without any pauses.  Given the prolonged QTc interaction sotalol and Bactrim, EP cardiology has discontinued the Bactrim at this time.    Recent pneumonitis, suspect from relapse of inflammatory lung process (h/o pulmonary toxicity from amiodarone versus other inflammatory process), on steroids.  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 Follow-up CT 3/2024 showed bilateral interstitial infiltrates with likely component of fibrosis. Recent hospitalization 5/2024 with issues including relapsed inflammatory lung process, started on high dose steroids with slow taper by Pulmonology.  * Initial presentation as above. CXR showed mild bibasilar atelectasis/scar, no pleural effusion, normal pulmonary vascularity. O2 sats 90's on RA on admit.  - Continue prednisone 10 mg daily through 8/8/2024; then 5 mg daily starting 8/9/2024 for 60 days.  - Continue sulfamethoxazole-trimethoprim prophylaxis - monitor renal function  closely, may need to hold if cr worsens.  - Follow-up with Pulmonology (pending PFTs and repeat CT for 8/2024).  He is on prolonged course of prednisone for pneumonitis as above, dose of 10 mg will soon be switched to less than 5 mg.  Has significant reaction with Bactrim with sotalol both can cause increase in QTc prolongation.    Exertional dyspnea, suspect multifactorial including CHF, recent pneumonitis.  - Continue to treat other issues as noted.    BOWEN with variable compliance secondary to frequent nocturia.  - Continue CPAP with 3L O2 while sleeping.    ROBIN on CKD likely stage III.  * Baseline creatinine 1.5.  * Cr 1.56 on admit. Started on diuresis as above.  Went up to 2.09 but improved again at 1.85 on 7/27.  Went up again to 2.03 on 7/28.  Held off on Diuril on 7/28,  Continue Bumex drip.  Creatinine with fluctuation daily, now slowly rising.  Recent Labs   Lab 08/01/24  0606 07/31/24  0644 07/30/24  0651 07/29/24  0552 07/28/24  0604 07/27/24  0627   CR 1.78* 1.75* 1.63* 1.58* 2.03* 1.85*   Estimated Creatinine Clearance: 50.3 mL/min (A) (based on SCr of 1.78 mg/dL (H)).  - Continue to monitor BMP  - Avoid nephrotoxic medications.  Creatinine stable at 1.78 today, now with IV Bumex is discontinued we will see and follow his kidney function tomorrow.    Hypokalemia and hypomagnesemia.  * Continued on scheduled potassium on admit.  Recent Labs   Lab 08/01/24  0606 07/31/24  0644 07/30/24  1524 07/30/24  1417 07/30/24  0651 07/29/24  1457 07/29/24  0552 07/28/24  1257 07/28/24  0604 07/27/24  1611 07/27/24  0627 07/26/24  1148 07/26/24  0707   POTASSIUM 3.5 3.7 3.8 4.3 3.1* 4.0 3.1*   < > 3.3*   < > 3.3*   < > 2.8*   MAG 1.9 1.9  --   --  1.9  --  1.9  --  1.9  --  1.9  --  1.6*   PHOS 4.2 3.8  --   --  3.8  --  3.8  --  4.5  --  4.0  --  4.1    < > = values in this interval not displayed.   - Continue scheduled potassium chloride 60 meq BID for now; monitor closely with ROBIN/CKD.  - Continue PRN K and Mg  "replacement - monitor K levels closely with ROBIN/CKD.    Alcohol use disorder with intermittent spells of sobriety.  * On admit, noted that pt had a few seltzer drinks the past week PTA.  - Cessation encouraged on admit.    Bell's palsy with asymmetric smile.  - Noted.    Lung nodules  * Follows with outpatient Pulmonology.  - Follow-up outpatient.    BPH.  - Continue PTA finasteride.    RLE wound.  * Noted on admit. WOC RN consulted.  - Continue local wound cares per WOC RN.    Severe obesity.  * Body mass index is 45.1 kg/m .  - Needs to continue to pursue aggressive dietary and lifestyle modifications.      Clinically Significant Risk Factors                 # Coagulation Defect: INR = 2.47 (Ref range: 0.85 - 1.15) and/or PTT = N/A, will monitor for bleeding    # Hypertension: Noted on problem list  # Acute heart failure with preserved ejection fraction: heart failure noted on problem list, last echo with EF >50%, and receiving IV diuretics           # Severe Obesity: Estimated body mass index is 45.1 kg/m  as calculated from the following:    Height as of an earlier encounter on 7/25/24: 1.727 m (5' 8\").    Weight as of this encounter: 134.5 kg (296 lb 9.6 oz).        # Financial/Environmental Concerns: none         Diet: Fluid restriction 2000 ML FLUID  Advance Diet as Tolerated: Regular Diet Adult; Moderate Consistent Carb (60 g CHO per Meal) Diet; 2 gm NA Diet; Low Saturated Fat Diet    Prophylaxis: PCD's, ambulation, and is on DOAC.  Palmer Catheter: Not present  Lines: None     Code Status: Full Code    Disposition Plan   Medically Ready for Discharge: Anticipated Tomorrow,    Expected discharge to home pending stability/improvement/resolution of above acute issues.    Entered: Narendra Thomas MD 08/01/2024, 2:37 PM     Patient overall improved and doing well.  On oral medications.  Will follow kidney functions tomorrow, if remains stable possible discharge tomorrow.    Interval History   Patient seen and " evaluated this morning, feeling much better, denies any chest pain shortness of orthopnea PND palpitation no fever chills or cough no dysuria materia constipation diarrhea.  He have a cardioversion done this morning and now back to normal sinus rhythm feeling much better.    IV Bumex infusion discontinued and started on oral torsemide.    No other significant event overnight    * Data reviewed today: I reviewed all new labs and imaging over the last 24 hours. I personally reviewed labs    Physical Exam   Most recent vitals:   , Blood pressure 101/67, pulse 54, temperature 97.5  F (36.4  C), temperature source Oral, resp. rate 16, weight 134.5 kg (296 lb 9.6 oz), SpO2 94%. O2 Device: None (Room air) Oxygen Delivery: 3 LPM  Vitals:    07/30/24 0430 07/31/24 0507 08/01/24 0625   Weight: 136.1 kg (300 lb) 135.1 kg (297 lb 12.8 oz) 134.5 kg (296 lb 9.6 oz)     Vital signs with ranges:  Temp:  [97.3  F (36.3  C)-98.5  F (36.9  C)] 97.5  F (36.4  C)  Pulse:  [] 54  Resp:  [16-18] 16  BP: ()/(56-90) 101/67  SpO2:  [91 %-98 %] 94 %  Patient Vitals for the past 24 hrs:   BP Temp Temp src Pulse Resp SpO2 Weight   08/01/24 1020 101/67 -- -- 54 16 94 % --   08/01/24 1015 106/65 -- -- 51 16 94 % --   08/01/24 1000 103/61 -- -- (!) 49 16 96 % --   08/01/24 0950 (!) 88/63 -- -- 51 16 96 % --   08/01/24 0945 91/60 -- -- 51 16 96 % --   08/01/24 0940 (!) 86/56 -- -- 50 16 95 % --   08/01/24 0935 99/68 -- -- 55 16 95 % --   08/01/24 0900 109/86 -- -- 75 16 94 % --   08/01/24 0735 -- 97.5  F (36.4  C) Oral 72 18 91 % --   08/01/24 0625 -- -- -- -- -- -- 134.5 kg (296 lb 9.6 oz)   08/01/24 0535 -- -- -- 68 -- -- --   08/01/24 0530 113/87 -- -- 68 18 92 % --   08/01/24 0100 108/80 -- -- 76 -- -- --   07/31/24 2041 139/79 98.5  F (36.9  C) Oral 79 18 98 % --   07/31/24 2005 102/59 -- -- 77 -- -- --   07/31/24 1945 102/59 97.3  F (36.3  C) Oral 79 18 93 % --   07/31/24 1600 115/87 97.7  F (36.5  C) Oral 73 16 94 % --   07/31/24  1509 -- 97.7  F (36.5  C) Oral -- 16 -- --   07/31/24 1500 (!) 116/90 -- -- (!) 143 -- -- --   07/31/24 1440 93/70 -- -- 79 -- -- --     I/O's last 24 hours:  I/O last 3 completed shifts:  In: 930 [P.O.:930]  Out: 3550 [Urine:3550]    Constitutional: awake, alert, oriented, pleasant, conversant, sitting up in chair  Cardiovascular: Irregular, systolic murmur noted, bilateral lower extremity 2+ pitting edema noted  Lungs: diminished in the bases, no crackles or wheezes  Gastrointestinal/Abdomen: soft, non-tender, non-distended, positive bowel sounds  Skin: bilateral lower extremities with 2+ pitting edema  Neurologic: Alert and conversing appropriately, moving all extremities, fluent speech  Psychiatric: Calm and pleasant       Data    Labs reviewed.  Recent Labs   Lab 08/01/24  0606 07/31/24  0644 07/30/24  1524 07/30/24  1417 07/30/24  0651 07/27/24  1611 07/27/24  0627 07/26/24  1148 07/26/24  0707   WBC  --   --   --   --   --   --  9.8  --  10.5   HGB  --   --   --   --   --   --  13.7  --  13.5   MCV  --   --   --   --   --   --  98  --  99   PLT  --   --   --   --   --   --  231  --  215   INR 2.47*  --   --   --   --   --   --   --   --     136  --   --  138   < > 136   < > 135   POTASSIUM 3.5 3.7 3.8   < > 3.1*   < > 3.3*   < > 2.8*   CHLORIDE 96* 93*  --   --  95*   < > 94*   < > 92*   CO2 34* 32*  --   --  34*   < > 32*   < > 32*   BUN 42.2* 38.4*  --   --  38.2*   < > 46.0*   < > 39.3*   CR 1.78* 1.75*  --   --  1.63*   < > 1.85*   < > 1.76*   ANIONGAP 9 11  --   --  9   < > 10   < > 11   ASHELY 8.8 9.3  --   --  9.1   < > 8.8   < > 8.9   * 127*  --   --  113*   < > 120*   < > 119*    < > = values in this interval not displayed.     Recent Labs   Lab Test 05/05/24  1515 05/05/24  1003 02/11/24  0848 02/11/24  0220 11/28/23  0623   NT-PROBNP, INPATIENT  --  1,202*  --  442 160   TROPONIN T HIGH SENSITIVITY 85* 90* 59* 57*  --      Recent Labs   Lab 08/01/24  0606 07/31/24  0644 07/30/24  0651  07/29/24  0552 07/28/24  0604 07/27/24  0627   * 127* 113* 119* 123* 120*     Recent Labs   Lab Test 02/25/19  0756 10/12/17  0814   A1C 5.7* 5.7       Recent Labs   Lab 08/01/24  0606   INR 2.47*     Recent Labs   Lab 07/31/24  1222 07/27/24  0627 07/26/24  0707   WBC  --  9.8 10.5   LACT 0.8  --   --        MICRO:  CULTURES (INCLUDING BLOOD AND URINE):  No lab results found in last 7 days.    Recent Results (from the past 24 hour(s))   EP Cardioversion External    Narrative    Ajay Almazan MD     8/1/2024  9:27 AM  [facility]    Procedure: EP Cardioversion External    Date/Time: 8/1/2024 9:26 AM    Performed by: Ajay Almazan MD  Authorized by: Shanthi Wilkins APRN [facility]      UNIVERSAL PROTOCOL   Site Marked: Yes  Prior Images Obtained and Reviewed:  Yes  Required items: Required blood products, implants, devices and special   equipment available    Patient identity confirmed:  Verbally with patient  Patient was reevaluated immediately before administering moderate or deep   sedation or anesthesia  Confirmation Checklist:  Patient's identity using two indicators  Time out: Immediately prior to the procedure a time out was called    Universal Protocol: the Joint Commission Universal Protocol was followed    Preparation: Patient was prepped and draped in usual sterile fashion       ANESTHESIA    Anesthesia was administered and monitored by anesthesiology.  See   anesthesia documentation for details.    SEDATION  Patient Sedated: Yes    Vital signs: Vital signs monitored during sedation      PROCEDURE DETAILS  Cardioversion basis: elective  Indications: failure of anti-arrhythmic medications  Pre-procedure rhythm: atrial fibrillation  Patient position: patient was placed in a supine position  Chest area: chest area exposed  Electrodes: pads  Electrodes placed: anterior-posterior  Number of attempts: 1    Details of Attempts:  DC Cardioversion Procedure Note:    Informed  consent obtained.  Pads placed in AP position.  Anesthesia used, please see their documentation.    Synchronized, biphasic 200J shock x 1 delivered with manual compression   was successful in converting atrial fibrillation (HR 80) to sinus   bradycardia (HR 50) without pauses.    No apparent complications.   Post-procedure rhythm: normal sinus rhythm  Complications: no complications      PROCEDURE    Patient Tolerance:  Patient tolerated the procedure well with no immediate   complications           Medications   All medications were reviewed. MAR.    Infusions:  Current Facility-Administered Medications   Medication Dose Route Frequency Provider Last Rate Last Admin    - MEDICATION INSTRUCTIONS -   Does not apply DOES NOT GO TO Courtney Blackmon APRN CNP        Continuing ACE inhibitor/ARB/ARNI from home medication list OR ACE inhibitor/ARB/ARNI order already placed during this visit   Does not apply DOES NOT GO TO Courtney Blackmon APRN CNP        Continuing beta blocker from home medication list OR beta blocker order already placed during this visit   Does not apply DOES NOT GO TO Courtney Blackmon APRN CNP        Give 1/2 of usual dose of LONG ACTING insulin AM of procedure IF diabetic   Does not apply Continuous PRN Shanthi Wilkins APRN CNP        May take oral meds with sip of water, the morning of Cardioversion procedure.   Does not apply Continuous PRN Shanthi Wilkins APRN CNP        Patient is already receiving anticoagulation with heparin, enoxaparin (LOVENOX), warfarin (COUMADIN)  or other anticoagulant medication   Does not apply Continuous PRN Courtney Roy APRN CNP        sodium chloride 0.9 % infusion   Intravenous Continuous Ajay Almazan MD   Stopped at 08/01/24 1022     Scheduled Medications:  Current Facility-Administered Medications   Medication Dose Route Frequency Provider Last Rate Last Admin    allopurinol (ZYLOPRIM) tablet 300 mg  300 mg Oral Daily Courtney Roy APRN CNP    300 mg at 08/01/24 1344    cetirizine (zyrTEC) tablet 10 mg  10 mg Oral QPM Courtney Roy APRN CNP   10 mg at 07/31/24 2040    finasteride (PROPECIA) tablet 1 mg  1 mg Oral Daily Courtney Roy APRN CNP   1 mg at 08/01/24 1345    losartan (COZAAR) tablet 50 mg  50 mg Oral Daily Courtney Roy APRN CNP   50 mg at 08/01/24 1344    miconazole (MICATIN) 2 % powder   Topical BID Masha Moy MD   Given at 07/31/24 2040    polyethylene glycol (MIRALAX) Packet 17 g  17 g Oral Daily Courtney Roy APRN CNP   17 g at 08/01/24 1345    potassium chloride pushpa ER (KLOR-CON M20) CR tablet 60 mEq  60 mEq Oral TID Malika Blanco PA-C   60 mEq at 08/01/24 1344    predniSONE (DELTASONE) tablet 10 mg  10 mg Oral Daily Courtney Roy APRN CNP   10 mg at 08/01/24 1344    Followed by    [START ON 8/9/2024] predniSONE (DELTASONE) tablet 5 mg  5 mg Oral Daily Courtney Roy APRN CNP        psyllium (METAMUCIL/KONSYL) capsule 1-2 capsule  1-2 capsule Oral Daily Courtney Roy APRN CNP   2 capsule at 08/01/24 1345    rivaroxaban ANTICOAGULANT (XARELTO) tablet 20 mg  20 mg Oral Daily with supper Courtney Roy APRN CNP   20 mg at 07/31/24 1632    sodium chloride (PF) 0.9% PF flush 3 mL  3 mL Intravenous Q8H Shanthi Wilkins APRN CNP   3 mL at 08/01/24 0842    sodium chloride (PF) 0.9% PF flush 3 mL  3 mL Intravenous Q8H Ajay Almazan MD   3 mL at 08/01/24 0857    sodium chloride (PF) 0.9% PF flush 3 mL  3 mL Intracatheter Q8H Courtney Roy APRN CNP   3 mL at 08/01/24 1353    sotalol (BETAPACE) tablet 80 mg  80 mg Oral Q12H [facility] (08/20) Shanthi Wilkins APRN CNP   80 mg at 08/01/24 0828    [Held by provider] spironolactone (ALDACTONE) tablet 25 mg  25 mg Oral Daily Courtney Roy APRN CNP        [START ON 8/2/2024] torsemide (DEMADEX) tablet 60 mg  60 mg Oral BID Malika Blanco PA-C         PRN Medications:  Current Facility-Administered Medications   Medication Dose Route Frequency Provider  Last Rate Last Admin    - MEDICATION INSTRUCTIONS -   Does not apply DOES NOT GO TO Courtney Blackmon APRN CNP        acetaminophen (TYLENOL) tablet 650 mg  650 mg Oral Q4H PRCourtney Diggs APRN CNP        Or    acetaminophen (TYLENOL) Suppository 650 mg  650 mg Rectal Q4H PRN Courtney Roy APRN CNP        calcium carbonate (TUMS) chewable tablet 1,000 mg  1,000 mg Oral 4x Daily PRN Courtney Roy APRN CNP        Continuing ACE inhibitor/ARB/ARNI from home medication list OR ACE inhibitor/ARB/ARNI order already placed during this visit   Does not apply DOES NOT GO TO Courtney Blackmon APRN CNP        Continuing beta blocker from home medication list OR beta blocker order already placed during this visit   Does not apply DOES NOT GO TO Courtney Blackmon APRN CNP        Give 1/2 of usual dose of LONG ACTING insulin AM of procedure IF diabetic   Does not apply Continuous PRN Shanthi Wilkins APRN CNP        HOLD Digoxin (Lanoxin) AM of procedure IF on digoxin   Does not apply HOLD Shanthi Wilkins APRN CNP        HOLD Insulin - RAPID/SHORT acting AM of procedure IF diabetic   Does not apply HOLD Shanthi Wilkins APRN CNP        HOLD Insulin - REGULAR AM of procedure IF diabetic   Does not apply HOLD Shanthi Wilkins APRN CNP        HOLD Oral hypoglycemics AM of procedure IF diabetic   Does not apply HOLD Shanthi Wilkins APRN CNP        hydrocortisone (CORTAID) 1 % cream   Topical TID PRN Ajay Almazan MD        lidocaine (LMX4) cream   Topical Q1H PRN Courtney Roy APRN CNP        lidocaine 1 % 0.1-1 mL  0.1-1 mL Other Q1H PRN Courtney Roy APRN CNP        magnesium sulfate 2 g in 50 mL sterile water intermittent infusion  2 g Intravenous Once PRN Shanthi Wilkins APRN CNP        May take oral meds with sip of water, the morning of Cardioversion procedure.   Does not apply Continuous PRN Shanthi Wilkins APRN CNP        melatonin tablet 1 mg  1 mg Oral At Bedtime Courtney Stewart APRN CNP         Patient is already receiving anticoagulation with heparin, enoxaparin (LOVENOX), warfarin (COUMADIN)  or other anticoagulant medication   Does not apply Continuous PRN Courtney Roy APRN CNP        potassium chloride pushpa ER (KLOR-CON M20) CR tablet 20 mEq  20 mEq Oral Once PRN Shanthi Wilkins APRN CNP        potassium chloride pushpa ER (KLOR-CON M20) CR tablet 40 mEq  40 mEq Oral Once PRN Shanthi Wilkins APRN CNP        prochlorperazine (COMPAZINE) injection 5 mg  5 mg Intravenous Q6H PRN Courtney Roy APRN CNP        Or    prochlorperazine (COMPAZINE) tablet 5 mg  5 mg Oral Q6H PRN Courtney Roy APRN CNP        Or    prochlorperazine (COMPAZINE) suppository 12.5 mg  12.5 mg Rectal Q12H PRN Courtney Roy APRN CNP        senna-docusate (SENOKOT-S/PERICOLACE) 8.6-50 MG per tablet 1 tablet  1 tablet Oral BID PRN Courtney Roy APRN CNP        Or    senna-docusate (SENOKOT-S/PERICOLACE) 8.6-50 MG per tablet 2 tablet  2 tablet Oral BID PRN Courtney Roy APRN CNP        sodium chloride (PF) 0.9% PF flush 3 mL  3 mL Intravenous Q1H PRN Shanthi Wilkins APRN CNP        sodium chloride (PF) 0.9% PF flush 3 mL  3 mL Intravenous Q1H PRN Ajay Almazan MD        sodium chloride (PF) 0.9% PF flush 3 mL  3 mL Intracatheter q1 min prn Courtney Roy APRN CNP

## 2024-08-01 NOTE — PROGRESS NOTES
EP Chart Check    Reviewed EKG done 8/1 @ 1508 with DR. Segura.      SB 57 bpm, with improved QT (still slightly long) 540/525 ms    Will check EKG tomorrow and arrange for close follow-up     ESTEVAN Rivas  August 1, 2024 at 4:17 PM

## 2024-08-01 NOTE — PROGRESS NOTES
[facility]    EP Progress Note    Date of Service (when I saw the patient): 08/01/2024     Assessment & Plan   Mike Schultz is a 73 year old male with h/o persistent AFib s/p multiple cardioversions, ablations (most recently 5/2024), and amiodarone-induced pneumonitis, HFpEF, mod AoS, HTN, BOWEN, alcohol use disorder, HFpEF, and lung nodules who was admitted on 7/25/2024 from clinic due to HFpEF.    Persistent AF -   First diagnosed 2012.  Failed flecainide (and subsequently scar noted on cMRI).   S/p PVI/SVC isolation 11/2017.  Repeat PVI and CTI 4/2019  Initially started on amiodarone/DCCV 11/2020.  Used amiodarone on and off, at varying doses between 11/2020-12/20/2021, then restarted 8/2022-1/2023, then again 9/2023-11/2023 with discovery of pneumonitis  S/p third and most recent ablation (posterior LA, reisolation of reconnected PVI, repeat CTI) 5/2/2024  Recurrent AF noted 5/2024 leading to recurrent HFpEF.    Started on sotalol 7/30/2024 (initial plan had been dofetilide) with plan for DCCV.  In the past, have not planned AVN ablation/PPM given symptoms have persisted despite excellent control of AF.  Sotalol 120 q 12h decreased to 80 mg q 12h yesterday 7/31 due to prolonged QT.  Patient also on Bactrim).  Now set up for cardioversion (0930).  EKG this morning with AF 76 bpm (QT stable)    Remains on Xarelto.  Has not missed any doses here, and confirms he missed no doses at home.     Component      Latest Ref Rng 7/25/2024  10:48 AM 7/30/2024  6:51 AM 7/31/2024  6:44 AM 8/1/2024  6:06 AM   Sodium      135 - 145 mmol/L 137  138  136  139    Potassium      3.4 - 5.3 mmol/L 3.5  3.1 (L)  3.7  3.5    Chloride      98 - 107 mmol/L 96 (L)  95 (L)  93 (L)  96 (L)    Carbon Dioxide (CO2)      22 - 29 mmol/L 32 (H)  34 (H)  32 (H)  34 (H)    Anion Gap      7 - 15 mmol/L 9  9  11  9    Urea Nitrogen      8.0 - 23.0 mg/dL 31.1 (H)  38.2 (H)  38.4 (H)  42.2 (H)    Creatinine      0.67 - 1.17  mg/dL 1.56 (H)  1.63 (H)  1.75 (H)  1.78 (H)    GFR Estimate      >60 mL/min/1.73m2 47 (L)  44 (L)  41 (L)  40 (L)    Calcium      8.8 - 10.4 mg/dL 9.2  9.1  9.3  8.8    Glucose      70 - 99 mg/dL 136 (H)  113 (H)  127 (H)  113 (H)        PLAN:   1. Cardioversion. We discussed Risk, Benefits and Indication of proceeding with direct current cardioversion (DCCV), including but not limited to use of anesthesia, surface burns, [id number]-goice arrhythmias requiring further treatment, discomfort and stroke.  The patient voiced understanding and understands that a  will be needed given the use of conscious sedation. A consent form was signed   2.  Will assess QT after DCCV to determine if can continue to use sotalol therapy   3.  Continue Xarelto without interruption for at least 1 month following successful cardioversion      2.  HFpEF -   Diuresis has been a challenge with Brayden.  Hospitalized 5/2024 for HFpEF after last ablation and found to also have recurrent pneumonitis (despite no amiodarone use).  Started on high-dose prednisone 5/2024, now tapering and down to 10 mg daily  Has also had ROBIN, with creatinine >3 just 4 days after discharge (when Cr had been stable ~1.4-1.5 in hospital) when discharged on metolazone 2.5 mg daily and torsemide 80 AM/40 PM  We have tried outpatient diuresis with metolazone every other day stopped d/t ineffectiveness.  Underwent infusion clinic the day before discharge 7/24 with IV Bumex and Diuril.  No response, and torsemide was increased to 80 mg daily and KCl to 60 mEq BID.  He continued spironolactone 25 mg daily and losartan    Due to continued HFpEF, admitted from clinic 7/25 and started on IV Bumex drip    RHC done 7/31 showing just mild pulmonary hypertension with moderately increased right heart filling pressures ( mean RA 14) and normal left heart filling pressures (wedge 15)     PLAN:   1.  Will defer to General Cardiology for ongoing diuresis   2.  Pharmacy had not been  able to assess cost of ARNI/SGLT-2.  Consider hydralazine/isosorbide?    Unfortunately, patient's insurance doesn't contract with [facility] so I am unable to determine cost.     He would be eligible to download copay cards from the  websites to decrease his copay as needed.  [employer] and [employer] offer $175 off per month; Entrest[employer] lowers copay to $10/mo with a yearly maximum benefit of $4100.         Natalie Campos PA-C, MSPAS    Interval History   Brayden just got brought down to Care Suites for DCCV    Physical Exam   Temp: 97.5  F (36.4  C) Temp src: Oral BP: 113/87 Pulse: 72   Resp: 18 SpO2: 91 % O2 Device: None (Room air)    Vitals:    07/30/24 0430 07/31/24 0507 08/01/24 0625   Weight: 136.1 kg (300 lb) 135.1 kg (297 lb 12.8 oz) 134.5 kg (296 lb 9.6 oz)     Vital Signs with Ranges  Temp:  [97.3  F (36.3  C)-98.5  F (36.9  C)] 97.5  F (36.4  C)  Pulse:  [] 72  Resp:  [16-18] 18  BP: ()/() 113/87  SpO2:  [91 %-98 %] 91 %  I/O last 3 completed shifts:  In: 930 [P.O.:930]  Out: 3550 [Urine:3550]    Telemetry: prior to leaving floor, AFib 70s    Deferred exam as pt off floor    Medications   Current Facility-Administered Medications   Medication Dose Route Frequency Provider Last Rate Last Admin    - MEDICATION INSTRUCTIONS -   Does not apply DOES NOT GO TO Courtney Blackmon APRN CNP        bumetanide (BUMEX) 0.25 mg/mL infusion  0.75 mg/hr Intravenous Continuous Wayne Harris MD 3 mL/hr at 07/31/24 1932 0.75 mg/hr at 07/31/24 1932    Continuing ACE inhibitor/ARB/ARNI from home medication list OR ACE inhibitor/ARB/ARNI order already placed during this visit   Does not apply DOES NOT GO TO Courtney Blackmon APRN CNP        Continuing beta blocker from home medication list OR beta blocker order already placed during this visit   Does not apply DOES NOT GO TO Courtney Blackmon APRN CNP        Give 1/2 of usual dose of LONG ACTING insulin AM of procedure  IF diabetic   Does not apply Continuous PRN RedShanthi trujillo APRN CNP        May take oral meds with sip of water, the morning of Cardioversion procedure.   Does not apply Continuous PRN RedShanthi trujillo APRN CNP        Patient is already receiving anticoagulation with heparin, enoxaparin (LOVENOX), warfarin (COUMADIN)  or other anticoagulant medication   Does not apply Continuous PRN Courtney Roy APRN CNP        sodium chloride 0.9 % infusion   Intravenous Continuous Ajay Almazan MD         Current Facility-Administered Medications   Medication Dose Route Frequency Provider Last Rate Last Admin    allopurinol (ZYLOPRIM) tablet 300 mg  300 mg Oral Daily Courtney Roy APRN CNP   300 mg at 07/31/24 0850    cetirizine (zyrTEC) tablet 10 mg  10 mg Oral QPM Courtney Roy APRN CNP   10 mg at 07/31/24 2040    finasteride (PROPECIA) tablet 1 mg  1 mg Oral Daily Courtney Roy APRN CNP   1 mg at 07/31/24 0850    losartan (COZAAR) tablet 50 mg  50 mg Oral Daily Courtney Roy APRN CNP   50 mg at 07/31/24 0857    magnesium oxide (MAG-OX) tablet 400 mg  400 mg Oral Q4H Narendra Thomas MD        miconazole (MICATIN) 2 % powder   Topical BID Masha Moy MD   Given at 07/31/24 2040    polyethylene glycol (MIRALAX) Packet 17 g  17 g Oral Daily Courtney Roy APRN CNP   17 g at 07/31/24 0850    potassium chloride pushpa ER (KLOR-CON M20) CR tablet 20 mEq  20 mEq Oral Once Narendra Thomas MD        potassium chloride pushpa ER (KLOR-CON M20) CR tablet 20 mEq  20 mEq Oral Once Masha Moy MD        potassium chloride pushpa ER (KLOR-CON M20) CR tablet 60 mEq  60 mEq Oral TID Malika Blanco PA-C   60 mEq at 07/31/24 2158    predniSONE (DELTASONE) tablet 10 mg  10 mg Oral Daily Courtney Roy APRN CNP   10 mg at 07/31/24 0850    Followed by    [START ON 8/9/2024] predniSONE (DELTASONE) tablet 5 mg  5 mg Oral Daily Courtney Roy APRN CNP        psyllium (METAMUCIL/KONSYL) capsule 1-2  capsule  1-2 capsule Oral Daily Courtney Roy APRN CNP   2 capsule at 07/31/24 1048    rivaroxaban ANTICOAGULANT (XARELTO) tablet 20 mg  20 mg Oral Daily with supper Courtney Roy APRN CNP   20 mg at 07/31/24 1632    sodium chloride (PF) 0.9% PF flush 3 mL  3 mL Intravenous Q8H Shanthi Wilkins APRN CNP        sodium chloride (PF) 0.9% PF flush 3 mL  3 mL Intravenous Q8H Ajay Almazan MD        sodium chloride (PF) 0.9% PF flush 3 mL  3 mL Intracatheter Q8H Courtney Roy APRN CNP   3 mL at 07/27/24 1050    sotalol (BETAPACE) tablet 80 mg  80 mg Oral Q12H ONEAL (08/20) Shanthi Wilkins APRN CNP   80 mg at 07/31/24 2040    [Held by provider] spironolactone (ALDACTONE) tablet 25 mg  25 mg Oral Daily Courtney Roy APRN CNP        sulfamethoxazole-trimethoprim (BACTRIM DS) 800-160 MG per tablet 1 tablet  1 tablet Oral Daily Courtney Roy APRN CNP   1 tablet at 07/31/24 0857    [Held by provider] torsemide (DEMADEX) tablet 80 mg  80 mg Oral Daily Courtney Roy APRN CNP           Data   I personally reviewed the EKG tracing showing AFib 76 bpm ./560 ms  Results for orders placed or performed during the hospital encounter of 07/25/24 (from the past 24 hour(s))   iStat Gases (lactate) venous, POCT   Result Value Ref Range    Lactic Acid POCT 0.8 <=2.0 mmol/L    Bicarbonate Venous POCT 37 (H) 21 - 28 mmol/L    O2 Sat, Venous POCT 56 (L) 70 - 75 %    pCO2 Venous POCT 54 (H) 40 - 50 mm Hg    pH Venous POCT 7.44 (H) 7.32 - 7.43    pO2 Venous POCT 29 25 - 47 mm Hg    Base Excess/Deficit (+/-) POCT 10.0 (H) -3.0 - 3.0 mmol/L   Cardiac Catheterization    Narrative    Moderately elevated right heart filling pressures (mean RA 14 mmHg)  Normal left heart filling pressures (wedge 15 mmHg)  Mild pulmonary hypertension     EKG 12-lead, tracing only   Result Value Ref Range    Systolic Blood Pressure  mmHg    Diastolic Blood Pressure  mmHg    Ventricular Rate 74 BPM    Atrial Rate 78 BPM    MD Interval  ms    QRS  Duration 92 ms     ms    QTc 559 ms    P Axis  degrees    R AXIS 49 degrees    T Axis 102 degrees    Interpretation ECG       Atrial fibrillation  Prolonged QT  Abnormal ECG  When compared with ECG of 31-Jul-2024 06:59, (unconfirmed)  No significant change was found  Confirmed by MD RACHALE, ELDER ([id number]) on 7/31/2024 1:49:01 PM     Basic metabolic panel   Result Value Ref Range    Sodium 139 135 - 145 mmol/L    Potassium 3.5 3.4 - 5.3 mmol/L    Chloride 96 (L) 98 - 107 mmol/L    Carbon Dioxide (CO2) 34 (H) 22 - 29 mmol/L    Anion Gap 9 7 - 15 mmol/L    Urea Nitrogen 42.2 (H) 8.0 - 23.0 mg/dL    Creatinine 1.78 (H) 0.67 - 1.17 mg/dL    GFR Estimate 40 (L) >60 mL/min/1.73m2    Calcium 8.8 8.8 - 10.4 mg/dL    Glucose 113 (H) 70 - 99 mg/dL   Magnesium   Result Value Ref Range    Magnesium 1.9 1.7 - 2.3 mg/dL   Phosphorus   Result Value Ref Range    Phosphorus 4.2 2.5 - 4.5 mg/dL   INR   Result Value Ref Range    INR 2.47 (H) 0.85 - 1.15   EKG 12-lead, tracing only   Result Value Ref Range    Systolic Blood Pressure  mmHg    Diastolic Blood Pressure  mmHg    Ventricular Rate 76 BPM    Atrial Rate 104 BPM    OR Interval  ms    QRS Duration 88 ms     ms    QTc 558 ms    P Axis  degrees    R AXIS 65 degrees    T Axis 91 degrees    Interpretation ECG       Atrial fibrillation with premature ventricular or aberrantly conducted complexes  Prolonged QT  Abnormal ECG  When compared with ECG of 31-Jul-2024 13:15,  No significant change was found       *Note: Due to a large number of results and/or encounters for the requested time period, some results have not been displayed. A complete set of results can be found in Results Review.

## 2024-08-01 NOTE — PROGRESS NOTES
EP Follow-up note post DCCV    Underwent DCCV with 200J x 1 successfully restoring SR. Had gotten sotalol 120 mg x 2 on 7/30, 80 mg x 2 on 7/31 and now 80 mg 8/1 AM    EKG in SR post DCCV 8/1 @ 1030 showed SB 55 bpm, QWT/QTc 600/580 ms.    QT very long d/t sotalol and Bactrim therapy, which he's on propylactically for prednisone taper starting 5/2024 (40 mg to start, now down to 10 mg daily with plans to drop to 5 mg later this week).    STOP Bactrim - will ask Pulmonology to see if there's another Abx that can be used in its place as likely contributing to QT prolongation  Getting KCl 60 mEq TID and now IV KCl 10 mEq x 1 ordered  Getting 400 mg QID MgOx  and IV Mg1 g x 1 ordered as well    4.  Will reassess EKG once K >4 and Mg >2.0 to assess QT      Component      Latest Ref Rng 7/31/2024  6:44 AM 8/1/2024  6:06 AM   Sodium      135 - 145 mmol/L 136  139    Potassium      3.4 - 5.3 mmol/L 3.7  3.5    Chloride      98 - 107 mmol/L 93 (L)  96 (L)    Carbon Dioxide (CO2)      22 - 29 mmol/L 32 (H)  34 (H)    Anion Gap      7 - 15 mmol/L 11  9    Urea Nitrogen      8.0 - 23.0 mg/dL 38.4 (H)  42.2 (H)    Creatinine      0.67 - 1.17 mg/dL 1.75 (H)  1.78 (H)    GFR Estimate      >60 mL/min/1.73m2 41 (L)  40 (L)    Calcium      8.8 - 10.4 mg/dL 9.3  8.8    Glucose      70 - 99 mg/dL 127 (H)  113 (H)       Component      Latest Ref Rng 7/29/2024  5:52 AM 7/30/2024  6:51 AM 7/31/2024  6:44 AM 8/1/2024  6:06 AM   Magnesium      1.7 - 2.3 mg/dL 1.9  1.9  1.9  1.9          Crystal Campos, EP PA

## 2024-08-01 NOTE — PROGRESS NOTES
"[facility]  WO Nurse Inpatient Assessment     Consulted for: RLE    Summary: Small wound to right lateral shin due to trauma and chronic edema. WO nurse will follow up weekly if patient remains inpatient.     Patient History (according to provider note(s):      \"Mike Schultz is a 73 year old male with a PMH significant for persistent atrial fibrillation, HFpEF, moderate aortic stenosis, hypertension, BOWEN, alcohol use disorder, Bell's palsy, lung nodules who was directly admitted from cardiology EP clinic on 7/25/2024 for ongoing decompensated heart failure symptoms despite aggressive outpatient management with primary drivers of his heart failure being his atrial fibrillation and corticosteroid use.\"    Assessment:      Areas visualized during today's visit: Focused:    Wound location: Right lateral shin        Last photo: 7/26  Wound due to: Mixed Etiology: Trauma, chronic edema  Wound history/plan of care: Patient reports he hit his leg with his car door. His legs are swollen at baseline and the skin is fragile. He has a resurfaced wound that is distal to current wound. Has worn compression in the past. Per chart review, patient saw Dr. Cason with the [facility] wound clinic back in April of this year and was instructed to wear SpandaGrip. Will continue this for patient as he is familiar with the product. Mepilex in place on writer's arrival.   Wound base:   moist, red granulation with yellow marbling throughout wound bed       Palpation of the wound bed: normal      Drainage: small     Description of drainage: serosanguinous     Measurements (length x width x depth, in cm): 1.8 x 1.6  x  0.1 cm      Tunneling: N/A     Undermining: N/A  Periwound skin: Edematous, Hemosiderin staining, and karine      Color: pink and red      Temperature: normal   Odor: none  Pain: denies , none  Pain interventions prior to dressing change: patient tolerated well and slow and gentle cares "   Treatment goal: Heal , Drainage control, and Protection  STATUS: initial assessment  Supplies ordered: gathered, ordered Polymem 2x4 and SpandaGrip size G, discussed with RN, and discussed with patient       Treatment Plan:     Right lateral shin wound(s): Every 3 days and PRN for loose/soiled dressing  Cleanse with saline or wound cleanser and gauze. Pat dry.  Cover wound with 2x4 Polymem strip (#[id number]). Initial and date dressing change.  Apply SpandaGrip (size G, #[id number]) to both legs from base of toes to below the knee.  Compression can be removed at night and for skin care.    Orders: Written    RECOMMEND PRIMARY TEAM ORDER: None, at this time  Education provided: plan of care and wound progress  Discussed plan of care with: Patient  WOC nurse follow-up plan: weekly  Notify WOC if wound(s) deteriorate.  Nursing to notify the Provider(s) and re-consult the WOC Nurse if new skin concern.    DATA:     Current support surface: Standard  Standard Isoflex gel  Containment of urine/stool: Continent of bladder and Continent of bowel  BMI: Body mass index is 45.1 kg/m .   Active diet order: Orders Placed This Encounter      Advance Diet as Tolerated: Regular Diet Adult; Moderate Consistent Carb (60 g CHO per Meal) Diet; 2 gm NA Diet; Low Saturated Fat Diet     Output: I/O last 3 completed shifts:  In: 930 [P.O.:930]  Out: 3550 [Urine:3550]     Labs:   Recent Labs   Lab 08/01/24  0606 07/27/24  0627   HGB  --  13.7   INR 2.47*  --    WBC  --  9.8     Pressure injury risk assessment:   Sensory Perception: 4-->no impairment  Moisture: 4-->rarely moist  Activity: 3-->walks occasionally  Mobility: 4-->no limitation  Nutrition: 3-->adequate  Friction and Shear: 3-->no apparent problem  Ru Score: 21    Savanna Lopez RN CWCN  Pager no longer is use, please contact through [employer] group: [facility] Nurse Melba

## 2024-08-01 NOTE — ANESTHESIA POSTPROCEDURE EVALUATION
Patient: Mike Schultz    Procedure: Procedure(s):  Anesthesia cardioversion       Anesthesia Type:  No value filed.    Note:  Disposition: Outpatient   Postop Pain Control: Uneventful            Sign Out: Well controlled pain   PONV:    Neuro/Psych: Uneventful            Sign Out: Acceptable/Baseline neuro status   Airway/Respiratory: Uneventful            Sign Out: Acceptable/Baseline resp. status   CV/Hemodynamics: Uneventful            Sign Out: Acceptable CV status   Other NRE: NONE   DID A NON-ROUTINE EVENT OCCUR? No           Last vitals:  Vitals:    08/01/24 0535 08/01/24 0735 08/01/24 0900   BP:   109/86   Pulse: 68 72 75   Resp:  18 16   Temp:  36.4  C (97.5  F)    SpO2:  91% 94%       Electronically Signed By: Ancelmo Marley MD  August 1, 2024  9:33 AM

## 2024-08-01 NOTE — ANESTHESIA CARE TRANSFER NOTE
Patient: Mike Schultz    Procedure: Procedure(s):  Anesthesia cardioversion       Diagnosis: A-fib (H) [I48.91]  Diagnosis Additional Information: No value filed.    Anesthesia Type:   No value filed.     Note:    Oropharynx: oropharynx clear of all foreign objects and spontaneously breathing  Level of Consciousness: drowsy  Oxygen Supplementation: nasal cannula  Level of Supplemental Oxygen (L/min / FiO2): 2  Independent Airway: airway patency satisfactory and stable  Dentition: dentition unchanged  Vital Signs Stable: post-procedure vital signs reviewed and stable  Report to RN Given: handoff report given  Destination: care suites overflow.  Comments: Pt exhibits spontaneous respirations, all monitors and alarms on in Care Suites Overflow, VSS, patent IV, report and transfer of care to RN.          Vitals:  Vitals Value Taken Time   BP     Temp     Pulse     Resp     SpO2         Electronically Signed By: AYAN Agustin CRNA  August 1, 2024  9:33 AM

## 2024-08-01 NOTE — PROGRESS NOTES
[facility] Cardiology Progress Note  Date of Service: 08/01/2024  Primary Cardiologist: Dr. Barb LIMON Antoine is a 73 year old male admitted on 7/25/2024 with acute on chronic HFpEF, likely triggered by recurrent/persistent atrial fibrillation. Diuresis with IV bumex @ 0.5-0.75 mg/hr and intermittent diuril.     Interim Hx: Underwent RHC 7/31 showing normal LV filling pressures with a wedge of 15, but an RAP of 14 and just mild PHTN. IV bumex continued overnight. Creat continues to rise today, up to 1.78.    Assessment:  Acute HFpEF  -- EDW is ~295 lbs. Today 296 lbs. Net -19L. Significant symptomatic improvement.   Persistent atrial fibrillation with multiple attempts at rhythm control, cardioversions, and ablation attempts, with pneumonitis 2/2 amiodarone  -- [id number] Score 4 (age, CHF, HTN, aortic plaque)  -- Anticoagulated with Xarelto   -- EP started on sotalol 7/30. Underwent successful DCCV 8/1.  Hypokalemia   ROBIN/CKD   Moderate aortic stenosis   History of abnormal nuclear stress test during admission 5/2024  -- Lexiscan stress test 5/2024- abnormal, small area of ischemia in the apical segments of the left ventricle, small area of nontransmural infarction in the basal inferolateral segments of the left ventricle associated with a mild degree of iva-infarct ischemia involving basal to mid inferior wall   -- This was thought to possibly be a false positive finding due to body habitus, but recommendation was a left and right heart cath when patients pulmonary status returned to baseline    -- No angina.  Can consider outpatient LH cath per primary cardiologist if warranted, once renal fn improves further.  Amiodarone pneumonitis, on steroids  HTN   BOWEN, compliant with CPAP   Myocardial scar noted on cardiac MRI   History of alcohol dependence     Plan:   Stop IV bumex, start oral torsemide at 60 mg BID (on 80 mg daily PTA) tomorrow.   Deferring jenny/jardiance for now  with renal dsyfn, but can consider adding in down the line.  Please have patient seen by PCP with a BMP in one week. Cardiology follow up is not available until September.   Cardiology will sign off.     Plan was formulated under direction of Dr. Harris.   Malika Blanco PA-C  [facility]  Pager: [phone]  Text Page  (7:30am - 4pm M-F)   __________________________________________________________________________  Physical Exam   Temp: 97.5  F (36.4  C) Temp src: Oral BP: 109/86 Pulse: 75   Resp: 16 SpO2: 94 % O2 Device: None (Room air)    Vitals:    07/30/24 0430 07/31/24 0507 08/01/24 0625   Weight: 136.1 kg (300 lb) 135.1 kg (297 lb 12.8 oz) 134.5 kg (296 lb 9.6 oz)       GENERAL:  The patient is in no apparent distress.   NECK: CVP appears normal, no masses or thyromegaly.  PULMONARY:  There is a normal respiratory effort. Clear lungs to auscultation bilaterally.   CARDIOVASCULAR:  RRR, no m/r/g.  GI:  Non tender abdomen with normoactive bowel sounds and no hepatosplenomegaly. There are no masses palpable.   EXTREMITIES:  1+ pitting edema to knees bilaterally, wraps in place  VASCULAR: 2+ Pulses bilaterally in upper and lower extremities.    Medications   Current Facility-Administered Medications   Medication Dose Route Frequency Provider Last Rate Last Admin    - MEDICATION INSTRUCTIONS -   Does not apply DOES NOT GO TO Courtney Blackmon APRN CNP        Continuing ACE inhibitor/ARB/ARNI from home medication list OR ACE inhibitor/ARB/ARNI order already placed during this visit   Does not apply DOES NOT GO TO Courtney Blackmon APRN CNP        Continuing beta blocker from home medication list OR beta blocker order already placed during this visit   Does not apply DOES NOT GO TO Courtney Blackmon APRN CNP        Give 1/2 of usual dose of LONG ACTING insulin AM of procedure IF diabetic   Does not apply Continuous PRN Shanthi Wilkins, APRN CNP        May take oral meds with sip of  water, the morning of Cardioversion procedure.   Does not apply Continuous PRN Shanthi Wilkins APRN CNP        Patient is already receiving anticoagulation with heparin, enoxaparin (LOVENOX), warfarin (COUMADIN)  or other anticoagulant medication   Does not apply Continuous PRN Courtney Roy APRN CNP        sodium chloride 0.9 % infusion   Intravenous Continuous Ajay Almazan MD 30 mL/hr at 08/01/24 0857 New Bag at 08/01/24 0857     Current Facility-Administered Medications   Medication Dose Route Frequency Provider Last Rate Last Admin    allopurinol (ZYLOPRIM) tablet 300 mg  300 mg Oral Daily Courtney Roy APRN CNP   300 mg at 07/31/24 0850    cetirizine (zyrTEC) tablet 10 mg  10 mg Oral QPM Courtney Roy APRN CNP   10 mg at 07/31/24 2040    finasteride (PROPECIA) tablet 1 mg  1 mg Oral Daily Courtney Roy APRN CNP   1 mg at 07/31/24 0850    losartan (COZAAR) tablet 50 mg  50 mg Oral Daily Courtney Roy APRN CNP   50 mg at 07/31/24 0857    magnesium oxide (MAG-OX) tablet 400 mg  400 mg Oral Q4H Narendra Thomas MD   400 mg at 08/01/24 0820    miconazole (MICATIN) 2 % powder   Topical BID Masha Moy MD   Given at 07/31/24 2040    polyethylene glycol (MIRALAX) Packet 17 g  17 g Oral Daily Courtney Roy APRN CNP   17 g at 07/31/24 0850    potassium chloride pushpa ER (KLOR-CON M20) CR tablet 60 mEq  60 mEq Oral TID Malika Blanco PA-C   60 mEq at 07/31/24 2158    predniSONE (DELTASONE) tablet 10 mg  10 mg Oral Daily Courtney Roy APRN CNP   10 mg at 07/31/24 0850    Followed by    [START ON 8/9/2024] predniSONE (DELTASONE) tablet 5 mg  5 mg Oral Daily Courtney Roy APRN CNP        psyllium (METAMUCIL/KONSYL) capsule 1-2 capsule  1-2 capsule Oral Daily Courtney Roy APRN CNP   2 capsule at 07/31/24 1048    rivaroxaban ANTICOAGULANT (XARELTO) tablet 20 mg  20 mg Oral Daily with supper Courtney Roy APRN CNP   20 mg at 07/31/24 1632    sodium chloride (PF) 0.9% PF  flush 3 mL  3 mL Intravenous Q8H Shanthi Wilkins APRN CNP   3 mL at 08/01/24 0842    sodium chloride (PF) 0.9% PF flush 3 mL  3 mL Intravenous Q8H Ajay Almazan MD   3 mL at 08/01/24 0857    sodium chloride (PF) 0.9% PF flush 3 mL  3 mL Intracatheter Q8H Courtney Roy APRN CNP   3 mL at 07/27/24 1050    sotalol (BETAPACE) tablet 80 mg  80 mg Oral Q12H [facility] (08/20) Shanthi Wilkins APRN CNP   80 mg at 08/01/24 0828    [Held by provider] spironolactone (ALDACTONE) tablet 25 mg  25 mg Oral Daily Courtney Roy APRN CNP        sulfamethoxazole-trimethoprim (BACTRIM DS) 800-160 MG per tablet 1 tablet  1 tablet Oral Daily Courtney Roy APRN CNP   1 tablet at 07/31/24 0857    [START ON 8/2/2024] torsemide (DEMADEX) tablet 60 mg  60 mg Oral BID Malika Blanco PA-C        [Held by provider] torsemide (DEMADEX) tablet 80 mg  80 mg Oral Daily Courtney Roy APRN CNP           Data   Most Recent 3 CBC's:  Recent Labs   Lab Test 07/27/24  0627 07/26/24  0707 05/16/24  1052   WBC 9.8 10.5 16.4*   HGB 13.7 13.5 15.0   MCV 98 99 98    215 372     Most Recent 3 BMP's:  Recent Labs   Lab Test 08/01/24  0606 07/31/24  0644 07/30/24  1524 07/30/24  1417 07/30/24  0651    136  --   --  138   POTASSIUM 3.5 3.7 3.8   < > 3.1*   CHLORIDE 96* 93*  --   --  95*   CO2 34* 32*  --   --  34*   BUN 42.2* 38.4*  --   --  38.2*   CR 1.78* 1.75*  --   --  1.63*   ANIONGAP 9 11  --   --  9   ASHELY 8.8 9.3  --   --  9.1   * 127*  --   --  113*    < > = values in this interval not displayed.

## 2024-08-01 NOTE — PROGRESS NOTES
Care Suites Arrival    Reason for Visit: In Patient Cardioversion    0835 A/O. Pt has sleep apnea. No dentures or loose teeth. NPO x 10+ hrs. Discussed/reviewed cardioversion procedure w/ verbal understanding received. All questions & concerns addressed.     0855 Dr Almazan at bedside to speak with pt. Consent signed at this time.    0717 EKG done - rhythm is atrial fibrillatiion.     CDV: Pt tolerated well. Synchronized DCCV x 1 with 200 joules. See rhythm strips. See Procedural Sedation Flowsheet. Total sedation given by anesthesia 120 mg propofol.    1030  Detailed report called to Tahira pimentel RN.    1043 [place] transferred back to heart center, room 260 via cart and transport NA.

## 2024-08-01 NOTE — ANESTHESIA PREPROCEDURE EVALUATION
Anesthesia Pre-Procedure Evaluation    Patient: Mike Schultz   MRN: [id number] : 1950        Procedure : Procedure(s):  Anesthesia cardioversion          Past Medical History:   Diagnosis Date    (HFpEF) heart failure with preserved ejection fraction (H)     Acute gouty arthritis     Alcohol use disorder in remission     Amiodarone pulmonary toxicity     Aortic stenosis     Basal cell carcinoma     Of the nose    Bell's palsy     Herpes simplex without mention of complication     Hyperlipidemia     Hypertension     Persistent atrial fibrillation (H)     persistent, DCCV 2017, ablation 17    Pulmonary nodules     Sleep apnea     CPAP      Past Surgical History:   Procedure Laterality Date    ABLATION OF DYSRHYTHMIC FOCUS  , 2019    Procedure: EP Ablation Focal AFIB;  Surgeon: Fady Day MD;  Location:  HEART CARDI[facility] CATH LAB    ACHILLES TENDON SURGERY      ANESTHESIA CARDIOVERSION N/A 2019    Procedure: ANESTHESIA CARDIOVERSION (CONNER);  Surgeon: GENERIC ANESTHESIA PROVIDER;  Location:  OR    ANESTHESIA CARDIOVERSION N/A 2020    Procedure: ANESTHESIA, FOR CARDIOVERSION (dr campbell);  Surgeon: GENERIC ANESTHESIA PROVIDER;  Location:  OR    ANESTHESIA CARDIOVERSION N/A 2022    Procedure: CARDIOVERSION;  Surgeon: GENERIC ANESTHESIA PROVIDER;  Location:  OR    ANESTHESIA CARDIOVERSION N/A 2023    Procedure: Anesthesia cardioversion;  Surgeon: GENERIC ANESTHESIA PROVIDER;  Location:  OR    ANESTHESIA CARDIOVERSION N/A 10/6/2023    Procedure: Anesthesia cardioversion;  Surgeon: GENERIC ANESTHESIA PROVIDER;  Location:  OR    BASAL CELL CARCINOMA EXCISION Right 2009    nose, took cartilage from inner ear.    CARDIOVERSION  2019    cardioversion for atr fib    COLONOSCOPY      COLONOSCOPY N/A 3/22/2023    Procedure: COLONOSCOPY, FLEXIBLE, WITH LESION REMOVAL USING SNARE;  Surgeon: Anne Lucero MD;  Location:  GI    EP ABLATION FOCAL  AFIB N/A 2019    Procedure: EP Ablation Focal AFIB;  Surgeon: Fady Day MD;  Location:  HEART CARDI[facility] CATH LAB    EP ABLATION FOCAL AFIB N/A 2024    Procedure: Ablation Atrial Fibrilation;  Surgeon: Fady Day MD;  Location:  HEART CARD[facility] CATH LAB    HERNIA REPAIR, UMBILICAL  2012    Procedure: HERNIORRHAPHY UMBILICAL;  UMBILICAL HERNIA REPAIR;  Surgeon: Ra Crocker MD;  Location: [facility]    HERNIORRHAPHY UMBILICAL  2012    Procedure: HERNIORRHAPHY UMBILICAL;  UMBILICAL HERNIA REPAIR;  Surgeon: Ra Crocker MD;  Location: [facility]    ORTHOPEDIC SURGERY      ORTHOPEDIC SURGERY      OTHER SURGICAL HISTORY Right 2017    total KNEE ARTHROSCOPY     TOTAL KNEE ARTHROPLASTY Left 2018    [facility] NONSPECIFIC PROCEDURE      achilles tendon    [facility] NONSPECIFIC PROCEDURE      knees, arthroscopy    [facility] NONSPECIFIC PROCEDURE      basal cell skin ca, nose    Z NONSPECIFIC PROCEDURE      flex sig; colonoscopy due 3/2008    Z NONSPECIFIC PROCEDURE      cardioversion for atr fib    ZZC TOTAL KNEE ARTHROPLASTY Left 2018    Dr. Malick Suarez      Allergies   Allergen Reactions    Amiodarone Shortness Of Breath     Suspected amiodarone toxicity involving lungs      Social History     Tobacco Use    Smoking status: Former     Current packs/day: 0.00     Average packs/day: 1 pack/day for 34.6 years (34.6 ttl pk-yrs)     Types: Cigarettes     Start date:      Quit date: 2011     Years since quittin.9     Passive exposure: Past    Smokeless tobacco: Never    Tobacco comments:     quit    Substance Use Topics    Alcohol use: Not Currently      Wt Readings from Last 1 Encounters:   24 134.5 kg (296 lb 9.6 oz)        Anesthesia Evaluation            ROS/MED HX  ENT/Pulmonary:     (+) sleep apnea, uses CPAP,              tobacco use, Past use,                       Neurologic:  - neg neurologic ROS     Cardiovascular:     (+) Dyslipidemia hypertension- -    -  - -                        dysrhythmias, a-fib,  valvular problems/murmurs type: AS Moderate.    Previous cardiac testing   Echo: Date: 5/24 Results:  Interpretation Summary     Technically challenging study due to patient body habitus.     The left ventricle is borderline dilated. There is mild concentric left  ventricular hypertrophy.  Left ventricular systolic function is low normal. The visual ejection fraction  is estimated at 55%. Regional wall motion challenging to assess due to  frequent ectopy/irregular R-R intervals however no obvious regional wall  motion abnormalities seen.  The right ventricle is borderline dilated. The right ventricular systolic  function is normal.  Moderate aortic stenosis, Vmax 3.7 m/s, mean gradient 35 mmHg, MARILYN 1.1cm2, DI  0.26. SVi 44 cc/m2. There is mild (1+) aortic regurgitation. Aortic valve  morphology is not well visualized, however it is heavily calcified.  There is no pericardial effusion.  The inferior vena cava was normal in size with preserved respiratory  variability.     This study was compared to a TTE from 2/11/2024. Global LV systolic function  is marginally less dynamic on this present study.    Stress Test:  Date: Results:    ECG Reviewed:  Date: Results:    Cath:  Date: Results:   (-) CAD   METS/Exercise Tolerance:     Hematologic:       Musculoskeletal:       GI/Hepatic:    (-) GERD   Renal/Genitourinary:       Endo:     (+)               Obesity,    (-) Type II DM   Psychiatric/Substance Use:     (+)   alcohol abuse      Infectious Disease:       Malignancy:       Other:            Physical Exam    Airway        Mallampati: IV   TM distance: > 3 FB   Neck ROM: full   Mouth opening: > 3 cm    Respiratory Devices and Support         Dental       (+) Modest Abnormalities - crowns, retainers, 1 or 2 missing teeth      Cardiovascular          Rhythm and rate: irregular     Pulmonary   pulmonary exam normal                OUTSIDE LABS:  CBC:   Lab Results  "  Component Value Date    WBC 9.8 07/27/2024    WBC 10.5 07/26/2024    HGB 13.7 07/27/2024    HGB 13.5 07/26/2024    HCT 41.6 07/27/2024    HCT 40.4 07/26/2024     07/27/2024     07/26/2024     BMP:   Lab Results   Component Value Date     08/01/2024     07/31/2024    POTASSIUM 3.5 08/01/2024    POTASSIUM 3.7 07/31/2024    CHLORIDE 96 (L) 08/01/2024    CHLORIDE 93 (L) 07/31/2024    CO2 34 (H) 08/01/2024    CO2 32 (H) 07/31/2024    BUN 42.2 (H) 08/01/2024    BUN 38.4 (H) 07/31/2024    CR 1.78 (H) 08/01/2024    CR 1.75 (H) 07/31/2024     (H) 08/01/2024     (H) 07/31/2024     COAGS:   Lab Results   Component Value Date    INR 2.47 (H) 08/01/2024     POC: No results found for: \"BGM\", \"HCG\", \"HCGS\"  HEPATIC:   Lab Results   Component Value Date    ALBUMIN 4.0 05/21/2024    PROTTOTAL 7.0 05/05/2024    ALT 40 05/05/2024    AST 34 05/05/2024    ALKPHOS 92 05/05/2024    BILITOTAL 1.3 (H) 05/05/2024     OTHER:   Lab Results   Component Value Date    LACT 0.8 07/31/2024    A1C 5.7 (H) 02/25/2019    ASHELY 8.8 08/01/2024    PHOS 4.2 08/01/2024    MAG 1.9 08/01/2024    TSH 2.67 11/24/2023    T4 1.06 10/18/2022    CRP <2.9 01/08/2021    SED 28 (H) 05/13/2024       Anesthesia Plan    ASA Status:  3    NPO Status:  NPO Appropriate    Anesthesia Type: General.     - Airway: Native airway   Induction: Intravenous, Propofol.           Consents    Anesthesia Plan(s) and associated risks, benefits, and realistic alternatives discussed. Questions answered and patient/representative(s) expressed understanding.     - Discussed:     - Discussed with:  Patient      - Extended Intubation/Ventilatory Support Discussed: No.      - Patient is DNR/DNI Status: No     Use of blood products discussed: No .     Postoperative Care            Comments:    Other Comments: Pre treatment with phenylephrine           Ancelmo Marley MD    I have reviewed the pertinent notes and labs in the chart from the past 30 " days and (re)examined the patient.  Any updates or changes from those notes are reflected in this note.

## 2024-08-01 NOTE — PRE-PROCEDURE
GENERAL PRE-PROCEDURE:   Procedure:  Cardioversion  Date/Time:  8/1/2024 9:07 AM    Verbal consent obtained?: Yes    Written consent obtained?: Yes    Risks and benefits: Risks, benefits and alternatives were discussed    Consent given by:  Patient  Patient states understanding of procedure being performed: Yes    Patient's understanding of procedure matches consent: Yes    Procedure consent matches procedure scheduled: Yes    Expected level of sedation:  Moderate  Appropriately NPO:  Yes  ASA Class:  3  Mallampati  :  Grade 3- soft palate visible, posterior pharyngeal wall not visible  Lungs:  Lungs clear with good breath sounds bilaterally  Heart:  A-fib  History & Physical reviewed:  History and physical reviewed and no updates needed  Statement of review:  I have reviewed the lab findings, diagnostic data, medications, and the plan for sedation

## 2024-08-02 ENCOUNTER — APPOINTMENT (OUTPATIENT)
Dept: OCCUPATIONAL THERAPY | Facility: CLINIC | Age: 74
DRG: 286 | End: 2024-08-02
Attending: STUDENT IN AN ORGANIZED HEALTH CARE EDUCATION/TRAINING PROGRAM
Payer: COMMERCIAL

## 2024-08-02 DIAGNOSIS — I50.33 ACUTE ON CHRONIC DIASTOLIC CONGESTIVE HEART FAILURE (H): Primary | ICD-10-CM

## 2024-08-02 LAB
ALBUMIN SERPL BCG-MCNC: 3.6 G/DL (ref 3.5–5.2)
ANION GAP SERPL CALCULATED.3IONS-SCNC: 6 MMOL/L (ref 7–15)
ATRIAL RATE - MUSE: 54 BPM
ATRIAL RATE - MUSE: 57 BPM
BASOPHILS # BLD AUTO: 0 10E3/UL (ref 0–0.2)
BASOPHILS NFR BLD AUTO: 0 %
BUN SERPL-MCNC: 50.5 MG/DL (ref 8–23)
CALCIUM SERPL-MCNC: 8.6 MG/DL (ref 8.8–10.4)
CHLORIDE SERPL-SCNC: 99 MMOL/L (ref 98–107)
CREAT SERPL-MCNC: 2.31 MG/DL (ref 0.67–1.17)
DIASTOLIC BLOOD PRESSURE - MUSE: NORMAL MMHG
DIASTOLIC BLOOD PRESSURE - MUSE: NORMAL MMHG
EGFRCR SERPLBLD CKD-EPI 2021: 29 ML/MIN/1.73M2
EOSINOPHIL # BLD AUTO: 0.1 10E3/UL (ref 0–0.7)
EOSINOPHIL NFR BLD AUTO: 1 %
ERYTHROCYTE [DISTWIDTH] IN BLOOD BY AUTOMATED COUNT: 16.5 % (ref 10–15)
GLUCOSE SERPL-MCNC: 128 MG/DL (ref 70–99)
HCO3 SERPL-SCNC: 31 MMOL/L (ref 22–29)
HCT VFR BLD AUTO: 39.2 % (ref 40–53)
HGB BLD-MCNC: 12.9 G/DL (ref 13.3–17.7)
IMM GRANULOCYTES # BLD: 0.2 10E3/UL
IMM GRANULOCYTES NFR BLD: 2 %
INTERPRETATION ECG - MUSE: NORMAL
INTERPRETATION ECG - MUSE: NORMAL
LYMPHOCYTES # BLD AUTO: 2.6 10E3/UL (ref 0.8–5.3)
LYMPHOCYTES NFR BLD AUTO: 23 %
MAGNESIUM SERPL-MCNC: 2.5 MG/DL (ref 1.7–2.3)
MCH RBC QN AUTO: 33.6 PG (ref 26.5–33)
MCHC RBC AUTO-ENTMCNC: 32.9 G/DL (ref 31.5–36.5)
MCV RBC AUTO: 102 FL (ref 78–100)
MONOCYTES # BLD AUTO: 1 10E3/UL (ref 0–1.3)
MONOCYTES NFR BLD AUTO: 9 %
NEUTROPHILS # BLD AUTO: 7.4 10E3/UL (ref 1.6–8.3)
NEUTROPHILS NFR BLD AUTO: 66 %
NRBC # BLD AUTO: 0 10E3/UL
NRBC BLD AUTO-RTO: 0 /100
P AXIS - MUSE: 47 DEGREES
P AXIS - MUSE: 49 DEGREES
PHOSPHATE SERPL-MCNC: 3.8 MG/DL (ref 2.5–4.5)
PLATELET # BLD AUTO: 222 10E3/UL (ref 150–450)
POTASSIUM SERPL-SCNC: 5 MMOL/L (ref 3.4–5.3)
PR INTERVAL - MUSE: 210 MS
PR INTERVAL - MUSE: 216 MS
QRS DURATION - MUSE: 68 MS
QRS DURATION - MUSE: 84 MS
QT - MUSE: 540 MS
QT - MUSE: 552 MS
QTC - MUSE: 523 MS
QTC - MUSE: 525 MS
R AXIS - MUSE: 35 DEGREES
R AXIS - MUSE: 67 DEGREES
RBC # BLD AUTO: 3.84 10E6/UL (ref 4.4–5.9)
SODIUM SERPL-SCNC: 136 MMOL/L (ref 135–145)
SYSTOLIC BLOOD PRESSURE - MUSE: NORMAL MMHG
SYSTOLIC BLOOD PRESSURE - MUSE: NORMAL MMHG
T AXIS - MUSE: 106 DEGREES
T AXIS - MUSE: 72 DEGREES
VENTRICULAR RATE- MUSE: 54 BPM
VENTRICULAR RATE- MUSE: 57 BPM
WBC # BLD AUTO: 11.4 10E3/UL (ref 4–11)

## 2024-08-02 PROCEDURE — 80069 RENAL FUNCTION PANEL: CPT | Performed by: INTERNAL MEDICINE

## 2024-08-02 PROCEDURE — 97110 THERAPEUTIC EXERCISES: CPT | Mod: GO

## 2024-08-02 PROCEDURE — 250N000013 HC RX MED GY IP 250 OP 250 PS 637: Performed by: PHYSICIAN ASSISTANT

## 2024-08-02 PROCEDURE — 250N000012 HC RX MED GY IP 250 OP 636 PS 637: Performed by: NURSE PRACTITIONER

## 2024-08-02 PROCEDURE — 210N000002 HC R&B HEART CARE

## 2024-08-02 PROCEDURE — 99233 SBSQ HOSP IP/OBS HIGH 50: CPT | Mod: FS | Performed by: PHYSICIAN ASSISTANT

## 2024-08-02 PROCEDURE — 83735 ASSAY OF MAGNESIUM: CPT | Performed by: NURSE PRACTITIONER

## 2024-08-02 PROCEDURE — 99222 1ST HOSP IP/OBS MODERATE 55: CPT | Performed by: INTERNAL MEDICINE

## 2024-08-02 PROCEDURE — 36415 COLL VENOUS BLD VENIPUNCTURE: CPT | Performed by: NURSE PRACTITIONER

## 2024-08-02 PROCEDURE — 85025 COMPLETE CBC W/AUTO DIFF WBC: CPT | Performed by: INTERNAL MEDICINE

## 2024-08-02 PROCEDURE — 93010 ELECTROCARDIOGRAM REPORT: CPT | Performed by: INTERNAL MEDICINE

## 2024-08-02 PROCEDURE — 250N000013 HC RX MED GY IP 250 OP 250 PS 637: Performed by: NURSE PRACTITIONER

## 2024-08-02 PROCEDURE — 99233 SBSQ HOSP IP/OBS HIGH 50: CPT | Performed by: INTERNAL MEDICINE

## 2024-08-02 PROCEDURE — 93005 ELECTROCARDIOGRAM TRACING: CPT

## 2024-08-02 RX ORDER — SOTALOL HYDROCHLORIDE 80 MG/1
80 TABLET ORAL EVERY 12 HOURS SCHEDULED
Status: DISCONTINUED | OUTPATIENT
Start: 2024-08-02 | End: 2024-08-09 | Stop reason: HOSPADM

## 2024-08-02 RX ADMIN — FINASTERIDE 1 MG: 1 TABLET, FILM COATED ORAL at 08:17

## 2024-08-02 RX ADMIN — MICONAZOLE NITRATE: 2 POWDER TOPICAL at 08:13

## 2024-08-02 RX ADMIN — RIVAROXABAN 20 MG: 20 TABLET, FILM COATED ORAL at 16:10

## 2024-08-02 RX ADMIN — SOTALOL HYDROCHLORIDE 80 MG: 80 TABLET ORAL at 22:02

## 2024-08-02 RX ADMIN — CETIRIZINE HYDROCHLORIDE 10 MG: 10 TABLET, FILM COATED ORAL at 22:02

## 2024-08-02 RX ADMIN — PREDNISONE 10 MG: 10 TABLET ORAL at 08:09

## 2024-08-02 RX ADMIN — MICONAZOLE NITRATE: 2 POWDER TOPICAL at 22:05

## 2024-08-02 RX ADMIN — ALLOPURINOL 300 MG: 300 TABLET ORAL at 08:08

## 2024-08-02 RX ADMIN — POLYETHYLENE GLYCOL 3350 17 G: 17 POWDER, FOR SOLUTION ORAL at 08:10

## 2024-08-02 RX ADMIN — Medication 2 CAPSULE: at 08:17

## 2024-08-02 RX ADMIN — SOTALOL HYDROCHLORIDE 80 MG: 80 TABLET ORAL at 11:10

## 2024-08-02 ASSESSMENT — ACTIVITIES OF DAILY LIVING (ADL)
ADLS_ACUITY_SCORE: 24

## 2024-08-02 NOTE — PLAN OF CARE
Goal Outcome Evaluation:    Orientation: A&Ox 4     Vitals/Tele: vss on ra pt it tom and has sofft Bps at times. Tele: sinus tom     IV Access/drains: piv sl     Diet: combination mod carb, cardiac     Mobility: independent     GI/: Continent     Wound/Skin: R shin wound     Discharge Plan: pending       See Flow sheets for assessment

## 2024-08-02 NOTE — PROGRESS NOTES
Reviewed EKG 8/2/2024 @ 1315, >2 hours after sotalol 80 mg given.  EKG reviewed by Dr. Segura  who agrees QT continues to improve.    SB 52 bpm, Qt/QTc 512 /476 ms      Appreciate Nephrology's input  May need to get Pulm consult/imaging if has to miss Pulm appt 8/8 (with PFTs, CT, 6 minute walk) - I don't believe CT has been repeated since high dose prednisone started 5/2024  Despite increase in Cr, EKG has remained stable. HFpEF seems to be exacerbated in [place] whenever he's in AFib and therefore would maintain SR.    PLAN:  Per Dr. Segura, continue sotalol 80 mg q12h (despite CrCl) if EKG shows QTc <550 ms  For now, continue Xarelto 20 mg daily (depsite CrCl) to avoid under-anticoagulation. Will need to readdress if CrCl remains depressed <51 ml/min  Diuresis per General Cardiology/Nephrology  If significant jump in Cr, pls watch QTc carefully on routine EKGs.  EKG ordered for tomorrow AM    ESTEVAN Rivas  August 2, 2024 at 5:23 PM

## 2024-08-02 NOTE — CONSULTS
RENAL CONSULTATION NOTE    REFERRING MD:  Narendra Thomas MD     REASON FOR CONSULTATION:  ROBIN on CKD 3    HPI:  73 y.o man with CKD II, hypertension, afib and grade III diastolic dysfunction, who was admitted on 7/25 for significant weight gain.     Baseline weight ~ 270 lbs.  Admitted with 310 lbs.   Weight is down from 310 lbs to 303 lbs.   He was diuresed 19 liters.  IV Bumex stopped on 8/1 and torsemide was started.  All there diuretics are on hold due to an increase in creatinine.     Admitted with a creatinine of 1.6 mg/dl.   He had a normal serum creatinine in May.   Scr is up to 2.31 mg/dl today.  Diuretics are on hold.    He had DCCV on 8/1. Currently, he is in sinus.     I reviewed notes from Dr. Thomas, () and from cardiology team.     ROS:  A complete review of systems was performed and is negative except as noted above.    PMH:    Past Medical History:   Diagnosis Date    (HFpEF) heart failure with preserved ejection fraction (H)     Acute gouty arthritis     Alcohol use disorder in remission     Amiodarone pulmonary toxicity     Aortic stenosis     Basal cell carcinoma     Of the nose    Bell's palsy     Herpes simplex without mention of complication     Hyperlipidemia     Hypertension 2000    Persistent atrial fibrillation (H)     persistent, DCCV 12/2017, ablation 11/6/17    Pulmonary nodules     Sleep apnea     CPAP       PSH:    Past Surgical History:   Procedure Laterality Date    ABLATION OF DYSRHYTHMIC FOCUS  2018, 04/12/2019    Procedure: EP Ablation Focal AFIB;  Surgeon: Fady Day MD;  Location:  [facility]    ACHILLES TENDON SURGERY  1997    ANESTHESIA CARDIOVERSION N/A 2/26/2019    Procedure: ANESTHESIA CARDIOVERSION (CONNER);  Surgeon: GENERIC ANESTHESIA PROVIDER;  Location:  OR    ANESTHESIA CARDIOVERSION N/A 11/19/2020    Procedure: ANESTHESIA, FOR CARDIOVERSION (dr campbell);  Surgeon: GENERIC ANESTHESIA PROVIDER;  Location:  OR    ANESTHESIA CARDIOVERSION N/A  8/22/2022    Procedure: CARDIOVERSION;  Surgeon: GENERIC ANESTHESIA PROVIDER;  Location:  OR    ANESTHESIA CARDIOVERSION N/A 9/13/2023    Procedure: Anesthesia cardioversion;  Surgeon: GENERIC ANESTHESIA PROVIDER;  Location:  OR    ANESTHESIA CARDIOVERSION N/A 10/6/2023    Procedure: Anesthesia cardioversion;  Surgeon: GENERIC ANESTHESIA PROVIDER;  Location:  OR    ANESTHESIA CARDIOVERSION N/A 8/1/2024    Procedure: Anesthesia cardioversion;  Surgeon: GENERIC ANESTHESIA PROVIDER;  Location:  OR    BASAL CELL CARCINOMA EXCISION Right 2009    nose, took cartilage from inner ear.    CARDIOVERSION  2012, 2019    cardioversion for atr fib    COLONOSCOPY  2008    COLONOSCOPY N/A 3/22/2023    Procedure: COLONOSCOPY, FLEXIBLE, WITH LESION REMOVAL USING SNARE;  Surgeon: Anne Lucero MD;  Location:  GI    CV RIGHT HEART CATH MEASUREMENTS RECORDED N/A 7/31/2024    Procedure: Right Heart Cath;  Surgeon: Adriano Lazo MD;  Location:  [facility]    EP ABLATION FOCAL AFIB N/A 4/12/2019    Procedure: EP Ablation Focal AFIB;  Surgeon: Fady Day MD;  Location:  [facility]    EP ABLATION FOCAL AFIB N/A 5/2/2024    Procedure: Ablation Atrial Fibrilation;  Surgeon: Fady Day MD;  Location:  [facility]    HERNIA REPAIR, UMBILICAL  08/20/2012    Procedure: HERNIORRHAPHY UMBILICAL;  UMBILICAL HERNIA REPAIR;  Surgeon: Ra Crocker MD;  Location: [facility]    HERNIORRHAPHY UMBILICAL  8/20/2012    Procedure: HERNIORRHAPHY UMBILICAL;  UMBILICAL HERNIA REPAIR;  Surgeon: Ra Crocker MD;  Location: [facility]    ORTHOPEDIC SURGERY      ORTHOPEDIC SURGERY      OTHER SURGICAL HISTORY Right 03/2017    total KNEE ARTHROSCOPY     TOTAL KNEE ARTHROPLASTY Left 03/16/2018    ZZC NONSPECIFIC PROCEDURE  1997    achilles tendon    ZZC NONSPECIFIC PROCEDURE      knees, arthroscopy    ZZC NONSPECIFIC PROCEDURE      basal cell skin ca, nose    ZZC NONSPECIFIC PROCEDURE      flex  sig; colonoscopy due 3/2008    [facility] NONSPECIFIC PROCEDURE  2012    cardioversion for atr fib    [facility] TOTAL KNEE ARTHROPLASTY Left 03/16/2018    Dr. Malick Suarez       MEDICATIONS:    Current Facility-Administered Medications   Medication Dose Route Frequency Provider Last Rate Last Admin    allopurinol (ZYLOPRIM) tablet 300 mg  300 mg Oral Daily Courtney Roy APRN CNP   300 mg at 08/02/24 0808    cetirizine (zyrTEC) tablet 10 mg  10 mg Oral QPM Courtney Roy APRN CNP   10 mg at 08/01/24 2115    finasteride (PROPECIA) tablet 1 mg  1 mg Oral Daily Courtney Roy APRN CNP   1 mg at 08/02/24 0817    [Held by provider] losartan (COZAAR) tablet 50 mg  50 mg Oral Daily Courtney Roy APRN CNP   50 mg at 08/01/24 1344    miconazole (MICATIN) 2 % powder   Topical BID Masha Moy MD   Given at 08/02/24 0813    polyethylene glycol (MIRALAX) Packet 17 g  17 g Oral Daily Courtney Roy APRN CNP   17 g at 08/02/24 0810    [Held by provider] potassium chloride pushpa ER (KLOR-CON M20) CR tablet 60 mEq  60 mEq Oral TID Malika Blanco PA-C   60 mEq at 08/01/24 2115    predniSONE (DELTASONE) tablet 10 mg  10 mg Oral Daily Courtney Roy APRN CNP   10 mg at 08/02/24 0809    Followed by    [START ON 8/9/2024] predniSONE (DELTASONE) tablet 5 mg  5 mg Oral Daily Courtney Roy APRN CNP        psyllium (METAMUCIL/KONSYL) capsule 1-2 capsule  1-2 capsule Oral Daily Courtney Roy APRN CNP   2 capsule at 08/02/24 0817    rivaroxaban ANTICOAGULANT (XARELTO) tablet 20 mg  20 mg Oral Daily with supper Courtney Roy APRN CNP   20 mg at 08/01/24 1811    sodium chloride (PF) 0.9% PF flush 3 mL  3 mL Intravenous Q8H Shanthi Wilkins APRN CNP   3 mL at 08/02/24 0811    sodium chloride (PF) 0.9% PF flush 3 mL  3 mL Intravenous Q8H Ajay Almazan MD   3 mL at 08/02/24 0812    sodium chloride (PF) 0.9% PF flush 3 mL  3 mL Intracatheter Q8H Courtney Roy APRN CNP   3 mL at 08/02/24 0812    sotalol (BETAPACE)  tablet 80 mg  80 mg Oral Q12H [facility] () Natalie Campos PA-C   80 mg at 24 1110    [Held by provider] spironolactone (ALDACTONE) tablet 25 mg  25 mg Oral Daily Courtney Roy, APRN CNP        [Held by provider] torsemide (DEMADEX) tablet 60 mg  60 mg Oral BID Malika Blanco PA-C           ALLERGIES:    Allergies as of 2024 - Reviewed 2024   Allergen Reaction Noted    Amiodarone Shortness Of Breath 2023       FH:    Family History   Problem Relation Age of Onset    Family History Negative Mother     Heart Disease Father         decesased at 42 - MI    Heart Disease Sister          MI    Lipids Sister     Lipids Sister     No Known Problems Sister     Heart Disease Brother          MI    Heart Disease Brother         CABG AT 49    Lung Cancer No family hx of        SH:    Social History     Socioeconomic History    Marital status: Single     Spouse name: Not on file    Number of children: Not on file    Years of education: Not on file    Highest education level: Not on file   Occupational History    Not on file   Tobacco Use    Smoking status: Former     Current packs/day: 0.00     Average packs/day: 1 pack/day for 34.6 years (34.6 ttl pk-yrs)     Types: Cigarettes     Start date:      Quit date: 2011     Years since quittin.9     Passive exposure: Past    Smokeless tobacco: Never    Tobacco comments:     quit 2011   Vaping Use    Vaping status: Never Used   Substance and Sexual Activity    Alcohol use: Not Currently    Drug use: Never    Sexual activity: Not Currently     Partners: Female     Birth control/protection: None   Other Topics Concern    Parent/sibling w/ CABG, MI or angioplasty before 65F 55M? Not Asked   Social History Narrative    Worked in retail (-) and insurance sales     Spends some time on [place] on [place]     Social Determinants of Health     Financial Resource Strain: Not on file   Food Insecurity: Not on file    Transportation Needs: Not on file   Physical Activity: Not on file   Stress: Not on file   Social Connections: Not on file   Interpersonal Safety: Not on file   Housing Stability: Not on file       PHYSICAL EXAM:    BP 98/66   Pulse 59   Temp 97.8  F (36.6  C) (Oral)   Resp 16   Wt 137.7 kg (303 lb 9.6 oz)   SpO2 95%   BMI 46.16 kg/m    GENERAL: pleasant. NAD  HEENT:  Normocephalic. No gross abnormalities.  Pupils equal.  MMM.   CV: RRR, + muirmur  RESP: Lungs are pretty clear. No wheezes. No crackles.  GI: Abdomen obese, soft, NT  MUSCULOSKELETAL: extremities 3+ pitting edema bilaterally.  SKIN: no suspicious lesions or rashes, dry to touch  NEURO:  Awake, alert and conversing normally  PSYCH: mood good, affect appropriate  LYMPH: No palpable ant/post cervical     LABS:      CBC RESULTS:     Recent Labs   Lab 08/02/24  0531 07/27/24  0627   WBC 11.4* 9.8   RBC 3.84* 4.25*   HGB 12.9* 13.7   HCT 39.2* 41.6    231       BMP RESULTS:  Recent Labs   Lab 08/02/24  0531 08/01/24  0606 07/31/24  0644 07/30/24  1524 07/30/24  1417 07/30/24  0651 07/29/24  1457 07/29/24  0552 07/28/24  1257 07/28/24  0604    139 136  --   --  138  --  137  --  136   POTASSIUM 5.0 3.5 3.7 3.8 4.3 3.1*   < > 3.1*   < > 3.3*   CHLORIDE 99 96* 93*  --   --  95*  --  93*  --  93*   CO2 31* 34* 32*  --   --  34*  --  34*  --  33*   BUN 50.5* 42.2* 38.4*  --   --  38.2*  --  42.7*  --  51.1*   CR 2.31* 1.78* 1.75*  --   --  1.63*  --  1.58*  --  2.03*   * 113* 127*  --   --  113*  --  119*  --  123*   ASHELY 8.6* 8.8 9.3  --   --  9.1  --  9.1  --  9.0    < > = values in this interval not displayed.       INR  Recent Labs   Lab 08/01/24  0606   INR 2.47*        DIAGNOSTICS:  Reviewed    A/P:  73 y.o gentleman grade III DD and severe hypervolemia, consulted for acute kidney injury    # Patient with excellent kidney function in May.     # Acute kidney injury: Scr fluctuates with volume status and heart failure condition. Scr  increased likely from episode of hypotension rather than being over diuresis as he still has 3+ pitting edema.     # Decompensated heart failure: Still has 3+ pitting edema and weight is significantly above baseline of ~ 270 lbs.    -prednisone is not helping    # Moderate aortic stenosis:    # Morbid obesity with BMI of 46.     Plan:  # Cont 2 grams Na and 1500 ml of fluid restriction  # If scr is better or stable tomorrow, then would resume IV diuretics. He still has 3+ pitting edema and 30+ lbs above baseline.   # Continue to wean off prednisone    Parveen Jj MD  [facility] - Nephrology  Office Phone: [phone]  Pager: [phone]

## 2024-08-02 NOTE — PROGRESS NOTES
[facility]    Internal Medicine Hospitalist Progress Note  08/02/2024  I evaluated patient on the above date.    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    Acute on chronic diastolic CHF exacerbation (HFpEF): Improved  Hypertension (benign essential).  AS, moderate.  [PTA: losartan 50 mg daily; metoprolol 12.5 mg BID; spironolactone 25 mg daily; torsemide 80 mg daily.]  * Echo 5/2024 showed LVEF 55%, no obvious regional wall motion abnormalities; RV borderline dilated with normal systolic function; moderate AS, mild AR.  * On admit, noted primary symptoms of significant weight gain, up to 310 pounds, diffuse edema, dyspnea. Felt to be multifactorial etiology including ongoing corticosteroid use as well as PAF.   * Wt 310 lbs on admit (notes baseline wt around 270 lbs). CXR showed mild bibasilar atelectasis/scar, no pleural effusion, normal pulmonary vascularity. Given bumetanide and chlorothiazide IV dose and started on bumetanide gtt on admit. Cardiology consulted on admit.     -He was on bumetanide infusion till 8/1/2024, received Diuril on 7/27, no re-dosing on 7/28 due to bump in creatinine.  Now started on oral torsemide 60 mg twice daily and IV Bumex discontinued on 8/1/2024.  - Continued losartan and metoprolol.  Losartan placed on hold on 7/28 due to worsening kidney function.  - Hold PTA spironolactone and torsemide for now.  -Diuresing well.  Right heart cath done on 7/31/2024,.  Shows moderately  elevated right heart filling pressures.  Normal left heart filling pressure.  Wedge pressure 15 mmHg.      EP consulted-see below.  - Continue 2 g sodium diet and 2 L fluid restriction.  - Continue to monitor i/o's, daily wts.  - Appreciate Cardiology help.  - AF management as noted.  Patient IV Bumex discontinued on 8/1/2024, unable to take oral torsemide Aldactone giving low blood pressure and worsening kidney function at this time.  Very slight increase to 303 from 296 pounds.  Cardiology is following.  If kidney function does not improve will consult nephrology.      Persistent A-fib, felt to be contributing to acute CHF.  Status post DC cardioversion: To normal sinus rhythm  * Follows with Dr. Day. H/o multiple antiarrhythmics including flecainide which he failed and amiodarone resulted in pneumonitis. H/o multiple ablations and cardioversion which have subsequently failed.   * Cardiology consulted on admit.  - Continue to monitor on telemetry.  - Continue PTA metoprolol and rivaroxaban.  -EP consulted on 7/30: Started on sotalol 120 Mg twice daily .  Dose was decreased to 80 Mg twice daily on 7/31 given rising creatinine and prolonged QT on EKG.   He is not successful DC cardioversion to normal sinus rhythm.  With sinus bradycardia initially without any pauses.  Given the prolonged QTc interaction sotalol and Bactrim, EP cardiology has discontinued the Bactrim at this time.    Recent pneumonitis, suspect from relapse of inflammatory lung process (h/o pulmonary toxicity from amiodarone versus other inflammatory process), on steroids.  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 Follow-up CT 3/2024 showed bilateral interstitial infiltrates with likely component of fibrosis. Recent hospitalization 5/2024 with issues including relapsed inflammatory lung process, started on high dose steroids with slow taper by Pulmonology.  * Initial presentation as above. CXR showed mild  bibasilar atelectasis/scar, no pleural effusion, normal pulmonary vascularity. O2 sats 90's on RA on admit.  - Continue prednisone 10 mg daily through 8/8/2024; then 5 mg daily starting 8/9/2024 for 60 days.  - Continue sulfamethoxazole-trimethoprim prophylaxis - monitor renal function closely, may need to hold if cr worsens.  - Follow-up with Pulmonology (pending PFTs and repeat CT for 8/2024).  He is on prolonged course of prednisone for pneumonitis as above, dose of 10 mg will soon be switched to less than 5 mg.  Has significant reaction with Bactrim with sotalol both can cause increase in QTc prolongation.    Exertional dyspnea, suspect multifactorial including CHF, recent pneumonitis.  - Continue to treat other issues as noted.    BOWEN with variable compliance secondary to frequent nocturia.  - Continue CPAP with 3L O2 while sleeping.    ROBIN on CKD likely stage III.  * Baseline creatinine 1.5.  * Cr 1.56 on admit. Started on diuresis as above.  Went up to 2.09 but improved again at 1.85 on 7/27.  Went up again to 2.03 on 7/28.  Held off on Diuril on 7/28,  Continue Bumex drip.  Creatinine with fluctuation daily, now slowly rising.  Recent Labs   Lab 08/02/24  0531 08/01/24  0606 07/31/24  0644 07/30/24  0651 07/29/24  0552 07/28/24  0604   CR 2.31* 1.78* 1.75* 1.63* 1.58* 2.03*   Estimated Creatinine Clearance: 39.3 mL/min (A) (based on SCr of 2.31 mg/dL (H)).  - Continue to monitor BMP  - Avoid nephrotoxic medications.  Creatinine improved to 1.78 on 8.24, despite holding diuretics his kidney function increased to 2.31 today.  Continue hold Aldactone and losartan.  I will consult nephrology to evaluate the patient.  Avoid hypotension, most likely he was hypertensive postprocedure on 8/1/2024, most likely the cause of his worsening kidney function.    Hypokalemia and hypomagnesemia.  * Continued on scheduled potassium on admit.  Recent Labs   Lab 08/02/24  0531 08/01/24  0606 07/31/24  0644 07/30/24  1524  "07/30/24  1417 07/30/24  0651 07/29/24  1457 07/29/24  0552 07/28/24  1257 07/28/24  0604 07/27/24  1611 07/27/24  0627   POTASSIUM 5.0 3.5 3.7 3.8 4.3 3.1*   < > 3.1*   < > 3.3*   < > 3.3*   MAG 2.5* 1.9 1.9  --   --  1.9  --  1.9  --  1.9  --  1.9   PHOS 3.8 4.2 3.8  --   --  3.8  --  3.8  --  4.5  --  4.0    < > = values in this interval not displayed.   - Continue scheduled potassium chloride 60 meq BID for now; monitor closely with ROBIN/CKD.  - Continue PRN K and Mg replacement - monitor K levels closely with ROBIN/CKD.    Alcohol use disorder with intermittent spells of sobriety.  * On admit, noted that pt had a few seltzer drinks the past week PTA.  - Cessation encouraged on admit.    Bell's palsy with asymmetric smile.  - Noted.    Lung nodules  * Follows with outpatient Pulmonology.  - Follow-up outpatient.    BPH.  - Continue PTA finasteride.    RLE wound.  * Noted on admit. WOC RN consulted.  - Continue local wound cares per WOC RN.    Severe obesity.  * Body mass index is 46.16 kg/m .  - Needs to continue to pursue aggressive dietary and lifestyle modifications.      Clinically Significant Risk Factors          # Hypocalcemia: Lowest Ca = 8.6 mg/dL in last 2 days, will monitor and replace as appropriate        # Coagulation Defect: INR = 2.47 (Ref range: 0.85 - 1.15) and/or PTT = N/A, will monitor for bleeding   # Acute Kidney Injury, unspecified: based on a >150% or 0.3 mg/dL increase in last creatinine compared to past 90 day average, will monitor renal function  # Hypertension: Noted on problem list  # Acute heart failure with preserved ejection fraction: heart failure noted on problem list, last echo with EF >50%, and receiving IV diuretics           # Severe Obesity: Estimated body mass index is 46.16 kg/m  as calculated from the following:    Height as of an earlier encounter on 7/25/24: 1.727 m (5' 8\").    Weight as of this encounter: 137.7 kg (303 lb 9.6 oz).        # Financial/Environmental " Concerns: none         Diet: Fluid restriction 2000 ML FLUID  Advance Diet as Tolerated: Regular Diet Adult; Moderate Consistent Carb (60 g CHO per Meal) Diet; 2 gm NA Diet; Low Saturated Fat Diet    Prophylaxis: PCD's, ambulation, and is on DOAC.  Palmer Catheter: Not present  Lines: None     Code Status: Full Code    Disposition Plan   Medically Ready for Discharge: Anticipated Tomorrow,    Expected discharge to home pending stability/improvement/resolution of above acute issues.    Entered: Narendra Thomas MD 08/02/2024, 12:54 PM       Interval History   Patient seen and evaluated this morning, complains some shortness of breath, dizzy lightheaded earlier this morning but improving at this time.  Denies any chest pain abdominal pain dysuria hematuria constipation diarrhea, weight increased to 303 pounds this morning from 296 from yesterday.    No other significant event overnight      * Data reviewed today: I reviewed all new labs and imaging over the last 24 hours. I personally reviewed labs    Physical Exam   Most recent vitals:   , Blood pressure 98/66, pulse 59, temperature 97.8  F (36.6  C), temperature source Oral, resp. rate 16, weight 137.7 kg (303 lb 9.6 oz), SpO2 95%. O2 Device: None (Room air)    Vitals:    07/31/24 0507 08/01/24 0625 08/02/24 0533   Weight: 135.1 kg (297 lb 12.8 oz) 134.5 kg (296 lb 9.6 oz) 137.7 kg (303 lb 9.6 oz)     Vital signs with ranges:  Temp:  [97.8  F (36.6  C)-98.3  F (36.8  C)] 97.8  F (36.6  C)  Pulse:  [49-60] 59  Resp:  [16-20] 16  BP: ()/(49-79) 98/66  SpO2:  [94 %-95 %] 95 %  Patient Vitals for the past 24 hrs:   BP Temp Temp src Pulse Resp SpO2 Weight   08/02/24 0943 98/66 97.8  F (36.6  C) Oral 59 16 95 % --   08/02/24 0934 (!) 87/66 -- -- 55 -- -- --   08/02/24 0932 (!) 81/49 -- -- 60 -- -- --   08/02/24 0533 -- -- -- -- -- -- 137.7 kg (303 lb 9.6 oz)   08/02/24 0412 92/64 -- -- 52 16 -- --   08/02/24 0410 (!) 87/61 -- -- (!) 49 -- -- --   08/02/24 0030 93/73 --  -- 53 -- 94 % --   08/01/24 2259 93/56 98.3  F (36.8  C) Oral 55 18 -- --   08/01/24 2100 106/62 -- -- -- -- -- --   08/01/24 1957 101/66 98  F (36.7  C) Oral 60 20 -- --   08/01/24 1555 113/79 97.8  F (36.6  C) Oral 55 -- -- --   08/01/24 1418 133/78 -- -- -- -- -- --     I/O's last 24 hours:  I/O last 3 completed shifts:  In: 660 [P.O.:460; I.V.:200]  Out: 850 [Urine:850]    Constitutional: awake, alert, oriented, pleasant, conversant, sitting up in chair  Cardiovascular: Irregular, systolic murmur noted, bilateral lower extremity 2+ pitting edema noted  Lungs: diminished in the bases, basal crackles positive  Gastrointestinal/Abdomen: soft, non-tender, non-distended, positive bowel sounds  Skin: bilateral lower extremities with 1+ pitting edema  Neurologic: Alert and conversing appropriately, moving all extremities, fluent speech  Psychiatric: Calm and pleasant       Data    Labs reviewed.  Recent Labs   Lab 08/02/24  0531 08/01/24  0606 07/31/24  0644 07/27/24  1611 07/27/24  0627   WBC 11.4*  --   --   --  9.8   HGB 12.9*  --   --   --  13.7   *  --   --   --  98     --   --   --  231   INR  --  2.47*  --   --   --     139 136   < > 136   POTASSIUM 5.0 3.5 3.7   < > 3.3*   CHLORIDE 99 96* 93*   < > 94*   CO2 31* 34* 32*   < > 32*   BUN 50.5* 42.2* 38.4*   < > 46.0*   CR 2.31* 1.78* 1.75*   < > 1.85*   ANIONGAP 6* 9 11   < > 10   ASHELY 8.6* 8.8 9.3   < > 8.8   * 113* 127*   < > 120*   ALBUMIN 3.6  --   --   --   --     < > = values in this interval not displayed.     Recent Labs   Lab Test 05/05/24  1515 05/05/24  1003 02/11/24  0848 02/11/24  0220 11/28/23  0623   NT-PROBNP, INPATIENT  --  1,202*  --  442 160   TROPONIN T HIGH SENSITIVITY 85* 90* 59* 57*  --      Recent Labs   Lab 08/02/24  0531 08/01/24  0606 07/31/24  0644 07/30/24  0651 07/29/24  0552 07/28/24  0604   * 113* 127* 113* 119* 123*     Recent Labs   Lab Test 02/25/19  0756 10/12/17  0814   A1C 5.7* 5.7        Recent Labs   Lab 08/01/24  0606   INR 2.47*     Recent Labs   Lab 08/02/24  0531 07/31/24  1222 07/27/24  0627   WBC 11.4*  --  9.8   LACT  --  0.8  --        MICRO:  CULTURES (INCLUDING BLOOD AND URINE):  No lab results found in last 7 days.    No results found for this or any previous visit (from the past 24 hour(s)).          Medications   All medications were reviewed. MAR.    Infusions:  Current Facility-Administered Medications   Medication Dose Route Frequency Provider Last Rate Last Admin    - MEDICATION INSTRUCTIONS -   Does not apply DOES NOT GO TO Courtney Blackmon APRN CNP        Continuing ACE inhibitor/ARB/ARNI from home medication list OR ACE inhibitor/ARB/ARNI order already placed during this visit   Does not apply DOES NOT GO TO Courtney Blackmon APRN CNP        Continuing beta blocker from home medication list OR beta blocker order already placed during this visit   Does not apply DOES NOT GO TO Courtney Blackmon APRN CNP        Give 1/2 of usual dose of LONG ACTING insulin AM of procedure IF diabetic   Does not apply Continuous PRN Shanthi Wilkins APRN CNP        May take oral meds with sip of water, the morning of Cardioversion procedure.   Does not apply Continuous PRN Shanthi Wilkins APRN CNP        Patient is already receiving anticoagulation with heparin, enoxaparin (LOVENOX), warfarin (COUMADIN)  or other anticoagulant medication   Does not apply Continuous PRN Courtney Roy APRN CNP        sodium chloride 0.9 % infusion   Intravenous Continuous Ajay Almazan MD   Stopped at 08/01/24 1022     Scheduled Medications:  Current Facility-Administered Medications   Medication Dose Route Frequency Provider Last Rate Last Admin    allopurinol (ZYLOPRIM) tablet 300 mg  300 mg Oral Daily Courtney Roy APRN CNP   300 mg at 08/02/24 0808    cetirizine (zyrTEC) tablet 10 mg  10 mg Oral QPM Courtney Roy APRN CNP   10 mg at 08/01/24 2115    finasteride (PROPECIA) tablet 1 mg   1 mg Oral Daily Courtney Roy APRN CNP   1 mg at 08/02/24 0817    [Held by provider] losartan (COZAAR) tablet 50 mg  50 mg Oral Daily Courtney Roy APRN CNP   50 mg at 08/01/24 1344    miconazole (MICATIN) 2 % powder   Topical BID Masha Moy MD   Given at 08/02/24 0813    polyethylene glycol (MIRALAX) Packet 17 g  17 g Oral Daily Courtney Roy APRN CNP   17 g at 08/02/24 0810    [Held by provider] potassium chloride pushpa ER (KLOR-CON M20) CR tablet 60 mEq  60 mEq Oral TID Malika Blanco PA-C   60 mEq at 08/01/24 2115    predniSONE (DELTASONE) tablet 10 mg  10 mg Oral Daily Courtney Roy APRN CNP   10 mg at 08/02/24 0809    Followed by    [START ON 8/9/2024] predniSONE (DELTASONE) tablet 5 mg  5 mg Oral Daily Courtney Roy APRN CNP        psyllium (METAMUCIL/KONSYL) capsule 1-2 capsule  1-2 capsule Oral Daily Courtney Roy APRN CNP   2 capsule at 08/02/24 0817    rivaroxaban ANTICOAGULANT (XARELTO) tablet 20 mg  20 mg Oral Daily with supper Courtney Roy APRN CNP   20 mg at 08/01/24 1811    sodium chloride (PF) 0.9% PF flush 3 mL  3 mL Intravenous Q8H Shanthi Wilkins APRN CNP   3 mL at 08/02/24 0811    sodium chloride (PF) 0.9% PF flush 3 mL  3 mL Intravenous Q8H Ajay Almazan MD   3 mL at 08/02/24 0812    sodium chloride (PF) 0.9% PF flush 3 mL  3 mL Intracatheter Q8H Courtney Roy APRN CNP   3 mL at 08/02/24 0812    sotalol (BETAPACE) tablet 80 mg  80 mg Oral Q12H ONEAL (08/20) Natalie Campos PA-C   80 mg at 08/02/24 1110    [Held by provider] spironolactone (ALDACTONE) tablet 25 mg  25 mg Oral Daily Courtney Roy APRN CNP        [Held by provider] torsemide (DEMADEX) tablet 60 mg  60 mg Oral BID Malika Blanco PA-C         PRN Medications:  Current Facility-Administered Medications   Medication Dose Route Frequency Provider Last Rate Last Admin    - MEDICATION INSTRUCTIONS -   Does not apply DOES NOT GO TO Courtney Blackmon APRN CNP         acetaminophen (TYLENOL) tablet 650 mg  650 mg Oral Q4H PRN Courtney Roy APRN CNP        Or    acetaminophen (TYLENOL) Suppository 650 mg  650 mg Rectal Q4H PRN Courtney Roy APRN CNP        calcium carbonate (TUMS) chewable tablet 1,000 mg  1,000 mg Oral 4x Daily PRN Courtney Roy APRN CNP        Continuing ACE inhibitor/ARB/ARNI from home medication list OR ACE inhibitor/ARB/ARNI order already placed during this visit   Does not apply DOES NOT GO TO Courtney Blackmon APRN CNP        Continuing beta blocker from home medication list OR beta blocker order already placed during this visit   Does not apply DOES NOT GO TO Courtney Blackmon APRN CNP        Give 1/2 of usual dose of LONG ACTING insulin AM of procedure IF diabetic   Does not apply Continuous PRN Shanthi Wilkins APRN CNP        HOLD Digoxin (Lanoxin) AM of procedure IF on digoxin   Does not apply HOLD Shanthi Wilkins APRN CNP        HOLD Insulin - RAPID/SHORT acting AM of procedure IF diabetic   Does not apply HOLD Shanthi Wilkins APRN CNP        HOLD Insulin - REGULAR AM of procedure IF diabetic   Does not apply HOLD Shanthi Wilkins APRN CNP        HOLD Oral hypoglycemics AM of procedure IF diabetic   Does not apply HOLD Shanthi Wilkins APRN CNP        hydrocortisone (CORTAID) 1 % cream   Topical TID PRN Ajay Almazan MD        lidocaine (LMX4) cream   Topical Q1H PRN Courtney Roy APRN CNP        lidocaine 1 % 0.1-1 mL  0.1-1 mL Other Q1H PRN Courtney Roy APRN CNP        magnesium sulfate 2 g in 50 mL sterile water intermittent infusion  2 g Intravenous Once PRN Shanthi Wilkins APRN CNP        May take oral meds with sip of water, the morning of Cardioversion procedure.   Does not apply Continuous PRN Shanthi Wilkins APRN CNP        melatonin tablet 1 mg  1 mg Oral At Bedtime PRN Courtney Roy APRN CNP        Patient is already receiving anticoagulation with heparin, enoxaparin (LOVENOX), warfarin (COUMADIN)  or other  anticoagulant medication   Does not apply Continuous PRN Courtney Roy APRN CNP        potassium chloride pushpa ER (KLOR-CON M20) CR tablet 20 mEq  20 mEq Oral Once PRN Shanthi Wilkins APRN CNP        potassium chloride pushpa ER (KLOR-CON M20) CR tablet 40 mEq  40 mEq Oral Once PRN Shanthi Wilkins APRN CNP        prochlorperazine (COMPAZINE) injection 5 mg  5 mg Intravenous Q6H PRN Courtney Roy APRN CNP        Or    prochlorperazine (COMPAZINE) tablet 5 mg  5 mg Oral Q6H PRN Courtney Roy APRN CNP        Or    prochlorperazine (COMPAZINE) suppository 12.5 mg  12.5 mg Rectal Q12H PRN Courtney Roy APRN CNP        senna-docusate (SENOKOT-S/PERICOLACE) 8.6-50 MG per tablet 1 tablet  1 tablet Oral BID PRN Courtney Roy APRN CNP        Or    senna-docusate (SENOKOT-S/PERICOLACE) 8.6-50 MG per tablet 2 tablet  2 tablet Oral BID PRN Courtney Roy APRN CNP        sodium chloride (PF) 0.9% PF flush 3 mL  3 mL Intravenous Q1H PRN Shanthi Wilkins APRN CNP        sodium chloride (PF) 0.9% PF flush 3 mL  3 mL Intravenous Q1H PRN Ajay Almazan MD        sodium chloride (PF) 0.9% PF flush 3 mL  3 mL Intracatheter q1 min prn Courtney Roy APRN CNP

## 2024-08-02 NOTE — PLAN OF CARE
A&O x 4.VSS. RA. Tele: SR w/PACs. Denies CP/SOB. Up independent in room. L) groin site, WDL. R) leg wound, covered with dressing, changed by WOC this shift. K/M/Phos protocol. Showed this shift. Will continue w/plan of care.

## 2024-08-02 NOTE — PROGRESS NOTES
[facility]    EP Progress Note    Date of Service (when I saw the patient): 08/02/2024     Assessment & Plan   Mike Schultz is a 73 year old male with h/o persistent AFib s/p multiple cardioversions, ablations (most recently 5/2024), and amiodarone-induced pneumonitis, HFpEF, mod AoS, HTN, BOWEN, alcohol use disorder, HFpEF, and lung nodules who was admitted on 7/25/2024 from clinic due to HFpEF.       Persistent AF -   First diagnosed 2012.  Failed flecainide (and subsequently scar noted on cMRI).   S/p PVI/SVC isolation 11/2017.  Repeat PVI and CTI 4/2019  Initially started on amiodarone/DCCV 11/2020.  Used amiodarone on and off, at varying doses between 11/2020-12/20/2021, then restarted 8/2022-1/2023, then again 9/2023-11/2023 with discovery of pneumonitis  S/p third and most recent ablation (posterior LA, reisolation of reconnected PVI, repeat CTI) 5/2/2024  Recurrent AF noted 5/2024 leading to recurrent HFpEF.     Started on sotalol 7/30/2024 (initial plan had been dofetilide) with plan for DCCV.  In the past, have not planned AVN ablation/PPM given symptoms have persisted despite excellent control of AF.  Sotalol 120 q 12h decreased to 80 mg q 12h 7/31 due to prolonged QT.  Patient also on Bactrim).  Patient got sotalol x 3 yesterday (0830, 2115, 2310). D/w Pharmacy    S/p DCCV 8/1 with successful restoration of SR.  After K and Mg supplementation, QT/QTc improved.       Remains on Xarelto.  Has not missed any doses here, and confirms he missed no doses at home. Note CrCl has dropped today with increase in Cr to 2.31.  Reviewed Cr has been 1.7 the last few days (CrCl closer to 50).  Now, with Cr 2.31, CrCl  to 39 and Pharmacy would recommend reducing Xarelto to 15 and Sotalol to 80 mg q24h.  Given this is likely a transient increase in Cr from overdiuresis, will give sotalol this AM and assess EKG.  Would keep Xarelto 20 mg daily at this point given likely just a transient increase  in Cr. Just underwent DCCV to restore SR and want to ensure he remains adequately anticoagulated     PLAN:   1. Reviewed EKG with Dr. Segura in setting of Cr increase/over-diuresis and 3 doses of sotalol given yesterday (last ~12 h ago). Will GIVE sotalol 80 mg x 1 today. Will assess EKG ~3 h later  to determine further dosing   2. Continue Xarelto 20 mg daily. If CrCl remains low, will need to decrease dose but want to ensure remains adequately anticoagulated.    3. Continue to monitor QT in setting of rising Cr/sotalol use. Would not discharge today given #2.   4.  Encouraged to use his home CPAP (brought it but hasn't used it here)    2.   HFpEF -   Diuresis has been a challenge with Brayden.  Hospitalized 5/2024 for HFpEF after last ablation and found to also have recurrent pneumonitis (despite no amiodarone use).  Started on high-dose prednisone 5/2024, now tapering and down to 10 mg daily  Has also had ROBIN, with creatinine >3 just 4 days after discharge (when Cr had been stable ~1.4-1.5 in hospital) when discharged on metolazone 2.5 mg daily and torsemide 80 AM/40 PM  We have tried outpatient diuresis with metolazone every other day stopped d/t ineffectiveness.  Underwent infusion clinic the day before discharge 7/24 with IV Bumex and Diuril.  No response, and torsemide was increased to 80 mg daily and KCl to 60 mEq BID.  He continued spironolactone 25 mg daily and losartan     Due to continued HFpEF, admitted from clinic 7/25 and started on IV Bumex drip     RHC done 7/31 showing just mild pulmonary hypertension with moderately increased right heart filling pressures (mean RA 14) and normal left heart filling pressures (wedge 15)    Cardiology recommended discharge on torsemide 60 mg BID with follow-up BMP with PCP in 1 week  Component      Latest Ref Rng 7/17/2024  9:22 AM 7/25/2024  10:48 AM 7/31/2024  6:44 AM 8/1/2024  6:06 AM 8/2/2024  5:31 AM   Sodium      135 - 145 mmol/L 136  137  136  139  136   "  Potassium      3.4 - 5.3 mmol/L 4.0  3.5  3.7  3.5  5.0    Chloride      98 - 107 mmol/L 94 (L)  96 (L)  93 (L)  96 (L)  99    Carbon Dioxide (CO2)      22 - 29 mmol/L 28  32 (H)  32 (H)  34 (H)  31 (H)    Anion Gap      7 - 15 mmol/L 14  9  11  9  6 (L)    Glucose      70 - 99 mg/dL 124 (H)  136 (H)  127 (H)  113 (H)  128 (H)    Urea Nitrogen      8.0 - 23.0 mg/dL 39.1 (H)  31.1 (H)  38.4 (H)  42.2 (H)  50.5 (H)    Creatinine      0.67 - 1.17 mg/dL 1.95 (H)  1.56 (H)  1.75 (H)  1.78 (H)  2.31 (H)    GFR Estimate      >60 mL/min/1.73m2 36 (L)  47 (L)  41 (L)  40 (L)  29 (L)    Calcium      8.8 - 10.4 mg/dL 9.2  9.2  9.3  8.8  8.6 (L)                    PLAN:               1.  Will ask Cardiology to weigh in again given significant jump in Cr   2. Not restarting spironolactone or initiating Entresto/Jardiance given renal dysfunction as now on sotalol therapy   3. As BPs 90-100s, will hold off on initiating hydralazine/isosorbide   4.  Consider Nephrology consultation.   5. Has Pulm follow-up for pneumonitis with PFTs, CT and 6 minute walk scheduled as OP for 8/8    Natalie Campos PA-C, MSPAS    Interval History   Note weight is up. Not feeling \"as good\" as yesterday  Reviewed that he DID feel better once he got back into  Thursday - easier to walk around, less DELEON    Physical Exam   Temp: 98.3  F (36.8  C) Temp src: Oral BP: 92/64 Pulse: 52   Resp: 16 SpO2: 94 % O2 Device: None (Room air) Oxygen Delivery: 3 LPM  Vitals:    07/31/24 0507 08/01/24 0625 08/02/24 0533   Weight: 135.1 kg (297 lb 12.8 oz) 134.5 kg (296 lb 9.6 oz) 137.7 kg (303 lb 9.6 oz)     Vital Signs with Ranges  Temp:  [97.5  F (36.4  C)-98.3  F (36.8  C)] 98.3  F (36.8  C)  Pulse:  [49-75] 52  Resp:  [16-20] 16  BP: ()/(56-86) 92/64  SpO2:  [91 %-96 %] 94 %  I/O last 3 completed shifts:  In: 660 [P.O.:460; I.V.:200]  Out: 850 [Urine:850]    Telemetry: SR 50s    Constitutional: Awake, alert  Respiratory: CTA B  Cardiovascular: " Regular, Slow. ANDREZ  MSK: LE edema    Medications   Current Facility-Administered Medications   Medication Dose Route Frequency Provider Last Rate Last Admin    - MEDICATION INSTRUCTIONS -   Does not apply DOES NOT GO TO Courtney Blackmon APRN CNP        Continuing ACE inhibitor/ARB/ARNI from home medication list OR ACE inhibitor/ARB/ARNI order already placed during this visit   Does not apply DOES NOT GO TO Courtney Blackmon APRN CNP        Continuing beta blocker from home medication list OR beta blocker order already placed during this visit   Does not apply DOES NOT GO TO Courtney Blackmon APRN CNP        Give 1/2 of usual dose of LONG ACTING insulin AM of procedure IF diabetic   Does not apply Continuous PRN RedShanthi trujillo APRN CNP        May take oral meds with sip of water, the morning of Cardioversion procedure.   Does not apply Continuous PRN RedShanthi trujillo APRN CNP        Patient is already receiving anticoagulation with heparin, enoxaparin (LOVENOX), warfarin (COUMADIN)  or other anticoagulant medication   Does not apply Continuous PRN Courtney Roy APRN CNP        sodium chloride 0.9 % infusion   Intravenous Continuous Ajay Almazan MD   Stopped at 08/01/24 1022     Current Facility-Administered Medications   Medication Dose Route Frequency Provider Last Rate Last Admin    allopurinol (ZYLOPRIM) tablet 300 mg  300 mg Oral Daily Courtney Roy APRN CNP   300 mg at 08/01/24 1344    cetirizine (zyrTEC) tablet 10 mg  10 mg Oral QPM Courtney Roy APRN CNP   10 mg at 08/01/24 2115    finasteride (PROPECIA) tablet 1 mg  1 mg Oral Daily Courtney Roy APRN CNP   1 mg at 08/01/24 1345    losartan (COZAAR) tablet 50 mg  50 mg Oral Daily Courtney Roy APRN CNP   50 mg at 08/01/24 1344    miconazole (MICATIN) 2 % powder   Topical BID Masha Moy MD   Given at 07/31/24 2040    polyethylene glycol (MIRALAX) Packet 17 g  17 g Oral Daily Courtney Roy APRN CNP   17 g at 08/01/24  1345    potassium chloride pushpa ER (KLOR-CON M20) CR tablet 60 mEq  60 mEq Oral TID Malika Blanco PA-C   60 mEq at 08/01/24 2115    predniSONE (DELTASONE) tablet 10 mg  10 mg Oral Daily Courtney Roy APRN CNP   10 mg at 08/01/24 1344    Followed by    [START ON 8/9/2024] predniSONE (DELTASONE) tablet 5 mg  5 mg Oral Daily Courtney Roy APRN CNP        psyllium (METAMUCIL/KONSYL) capsule 1-2 capsule  1-2 capsule Oral Daily Courtney Roy APRN CNP   2 capsule at 08/01/24 1345    rivaroxaban ANTICOAGULANT (XARELTO) tablet 20 mg  20 mg Oral Daily with supper Courtney Roy APRN CNP   20 mg at 08/01/24 1811    sodium chloride (PF) 0.9% PF flush 3 mL  3 mL Intravenous Q8H Shanthi Wilkins APRN CNP   3 mL at 08/01/24 0842    sodium chloride (PF) 0.9% PF flush 3 mL  3 mL Intravenous Q8H Ajay Almazan MD   3 mL at 08/01/24 1811    sodium chloride (PF) 0.9% PF flush 3 mL  3 mL Intracatheter Q8H Courtney Roy APRN CNP   3 mL at 08/02/24 0242    sotalol (BETAPACE) tablet 80 mg  80 mg Oral Q12H ONEAL (08/20) Shanthi Wilkins APRN CNP   80 mg at 08/01/24 2310    [Held by provider] spironolactone (ALDACTONE) tablet 25 mg  25 mg Oral Daily Courtney Roy APRN CNP        torsemide (DEMADEX) tablet 60 mg  60 mg Oral BID Malika Blanco PA-C           Data   I personally reviewed the EKG tracing showing SB  54 bpm with QT/QTc 552/520 ms  Results for orders placed or performed during the hospital encounter of 07/25/24 (from the past 24 hour(s))   EP Cardioversion External    Narrative    Ajay Almazan MD     8/1/2024  9:27 AM  [facility]    Procedure: EP Cardioversion External    Date/Time: 8/1/2024 9:26 AM    Performed by: Ajay Almazan MD  Authorized by: Shanthi Wilkins APRN [facility]      UNIVERSAL PROTOCOL   Site Marked: Yes  Prior Images Obtained and Reviewed:  Yes  Required items: Required blood products, implants, devices and special   equipment available    Patient  identity confirmed:  Verbally with patient  Patient was reevaluated immediately before administering moderate or deep   sedation or anesthesia  Confirmation Checklist:  Patient's identity using two indicators  Time out: Immediately prior to the procedure a time out was called    Universal Protocol: the Joint Commission Universal Protocol was followed    Preparation: Patient was prepped and draped in usual sterile fashion       ANESTHESIA    Anesthesia was administered and monitored by anesthesiology.  See   anesthesia documentation for details.    SEDATION  Patient Sedated: Yes    Vital signs: Vital signs monitored during sedation      PROCEDURE DETAILS  Cardioversion basis: elective  Indications: failure of anti-arrhythmic medications  Pre-procedure rhythm: atrial fibrillation  Patient position: patient was placed in a supine position  Chest area: chest area exposed  Electrodes: pads  Electrodes placed: anterior-posterior  Number of attempts: 1    Details of Attempts:  DC Cardioversion Procedure Note:    Informed consent obtained.  Pads placed in AP position.  Anesthesia used, please see their documentation.    Synchronized, biphasic 200J shock x 1 delivered with manual compression   was successful in converting atrial fibrillation (HR 80) to sinus   bradycardia (HR 50) without pauses.    No apparent complications.   Post-procedure rhythm: normal sinus rhythm  Complications: no complications      PROCEDURE    Patient Tolerance:  Patient tolerated the procedure well with no immediate   complications   EKG 12-lead, tracing only   Result Value Ref Range    Systolic Blood Pressure  mmHg    Diastolic Blood Pressure  mmHg    Ventricular Rate 55 BPM    Atrial Rate 55 BPM    NY Interval 198 ms    QRS Duration 80 ms     ms    QTc 583 ms    P Axis 35 degrees    R AXIS 38 degrees    T Axis 99 degrees    Interpretation ECG       Sinus bradycardia  T wave abnormality, consider lateral ischemia  Prolonged QT  Abnormal  ECG  When compared with ECG of 01-Aug-2024 07:17, (unconfirmed)  Sinus rhythm has replaced Atrial fibrillation  Confirmed by MD CANO DEMOS ([id number]) on 8/1/2024 2:26:00 PM     EKG 12-lead, tracing only   Result Value Ref Range    Systolic Blood Pressure  mmHg    Diastolic Blood Pressure  mmHg    Ventricular Rate 57 BPM    Atrial Rate 57 BPM    NC Interval 216 ms    QRS Duration 68 ms     ms    QTc 525 ms    P Axis 49 degrees    R AXIS 35 degrees    T Axis 106 degrees    Interpretation ECG       Sinus bradycardia with 1st degree A-V block  Abnormal QRS-T angle, consider primary T wave abnormality  Abnormal ECG  When compared with ECG of 01-Aug-2024 10:29,  QT has shortened     Magnesium level   Result Value Ref Range    Magnesium 2.5 (H) 1.7 - 2.3 mg/dL   CBC with Platelets & Differential    Narrative    The following orders were created for panel order CBC with Platelets & Differential.  Procedure                               Abnormality         Status                     ---------                               -----------         ------                     CBC with platelets and d...[[id number]]  Abnormal            Final result                 Please view results for these tests on the individual orders.   Renal panel   Result Value Ref Range    Sodium 136 135 - 145 mmol/L    Potassium 5.0 3.4 - 5.3 mmol/L    Chloride 99 98 - 107 mmol/L    Carbon Dioxide (CO2) 31 (H) 22 - 29 mmol/L    Anion Gap 6 (L) 7 - 15 mmol/L    Glucose 128 (H) 70 - 99 mg/dL    Urea Nitrogen 50.5 (H) 8.0 - 23.0 mg/dL    Creatinine 2.31 (H) 0.67 - 1.17 mg/dL    GFR Estimate 29 (L) >60 mL/min/1.73m2    Calcium 8.6 (L) 8.8 - 10.4 mg/dL    Albumin 3.6 3.5 - 5.2 g/dL    Phosphorus 3.8 2.5 - 4.5 mg/dL   CBC with platelets and differential   Result Value Ref Range    WBC Count 11.4 (H) 4.0 - 11.0 10e3/uL    RBC Count 3.84 (L) 4.40 - 5.90 10e6/uL    Hemoglobin 12.9 (L) 13.3 - 17.7 g/dL    Hematocrit 39.2 (L) 40.0 - 53.0 %     (H) 78 -  100 fL    MCH 33.6 (H) 26.5 - 33.0 pg    MCHC 32.9 31.5 - 36.5 g/dL    RDW 16.5 (H) 10.0 - 15.0 %    Platelet Count 222 150 - 450 10e3/uL    % Neutrophils 66 %    % Lymphocytes 23 %    % Monocytes 9 %    % Eosinophils 1 %    % Basophils 0 %    % Immature Granulocytes 2 %    NRBCs per 100 WBC 0 <1 /100    Absolute Neutrophils 7.4 1.6 - 8.3 10e3/uL    Absolute Lymphocytes 2.6 0.8 - 5.3 10e3/uL    Absolute Monocytes 1.0 0.0 - 1.3 10e3/uL    Absolute Eosinophils 0.1 0.0 - 0.7 10e3/uL    Absolute Basophils 0.0 0.0 - 0.2 10e3/uL    Absolute Immature Granulocytes 0.2 <=0.4 10e3/uL    Absolute NRBCs 0.0 10e3/uL     *Note: Due to a large number of results and/or encounters for the requested time period, some results have not been displayed. A complete set of results can be found in Results Review.

## 2024-08-03 ENCOUNTER — APPOINTMENT (OUTPATIENT)
Dept: OCCUPATIONAL THERAPY | Facility: CLINIC | Age: 74
DRG: 286 | End: 2024-08-03
Attending: STUDENT IN AN ORGANIZED HEALTH CARE EDUCATION/TRAINING PROGRAM
Payer: COMMERCIAL

## 2024-08-03 LAB
ALBUMIN SERPL BCG-MCNC: 3.6 G/DL (ref 3.5–5.2)
ANION GAP SERPL CALCULATED.3IONS-SCNC: 5 MMOL/L (ref 7–15)
BASOPHILS # BLD AUTO: 0 10E3/UL (ref 0–0.2)
BASOPHILS NFR BLD AUTO: 0 %
BUN SERPL-MCNC: 33.5 MG/DL (ref 8–23)
CALCIUM SERPL-MCNC: 9.2 MG/DL (ref 8.8–10.4)
CHLORIDE SERPL-SCNC: 100 MMOL/L (ref 98–107)
CREAT SERPL-MCNC: 1.41 MG/DL (ref 0.67–1.17)
EGFRCR SERPLBLD CKD-EPI 2021: 53 ML/MIN/1.73M2
EOSINOPHIL # BLD AUTO: 0.1 10E3/UL (ref 0–0.7)
EOSINOPHIL NFR BLD AUTO: 1 %
ERYTHROCYTE [DISTWIDTH] IN BLOOD BY AUTOMATED COUNT: 16.5 % (ref 10–15)
GLUCOSE SERPL-MCNC: 95 MG/DL (ref 70–99)
HCO3 SERPL-SCNC: 32 MMOL/L (ref 22–29)
HCT VFR BLD AUTO: 40.1 % (ref 40–53)
HGB BLD-MCNC: 12.6 G/DL (ref 13.3–17.7)
IMM GRANULOCYTES # BLD: 0.1 10E3/UL
IMM GRANULOCYTES NFR BLD: 1 %
LYMPHOCYTES # BLD AUTO: 2.9 10E3/UL (ref 0.8–5.3)
LYMPHOCYTES NFR BLD AUTO: 28 %
MAGNESIUM SERPL-MCNC: 2.3 MG/DL (ref 1.7–2.3)
MCH RBC QN AUTO: 31.7 PG (ref 26.5–33)
MCHC RBC AUTO-ENTMCNC: 31.4 G/DL (ref 31.5–36.5)
MCV RBC AUTO: 101 FL (ref 78–100)
MONOCYTES # BLD AUTO: 1 10E3/UL (ref 0–1.3)
MONOCYTES NFR BLD AUTO: 10 %
NEUTROPHILS # BLD AUTO: 6 10E3/UL (ref 1.6–8.3)
NEUTROPHILS NFR BLD AUTO: 59 %
NRBC # BLD AUTO: 0 10E3/UL
NRBC BLD AUTO-RTO: 0 /100
PHOSPHATE SERPL-MCNC: 4 MG/DL (ref 2.5–4.5)
PLATELET # BLD AUTO: 223 10E3/UL (ref 150–450)
POTASSIUM SERPL-SCNC: 4.8 MMOL/L (ref 3.4–5.3)
RBC # BLD AUTO: 3.97 10E6/UL (ref 4.4–5.9)
SODIUM SERPL-SCNC: 137 MMOL/L (ref 135–145)
WBC # BLD AUTO: 10.2 10E3/UL (ref 4–11)

## 2024-08-03 PROCEDURE — 250N000013 HC RX MED GY IP 250 OP 250 PS 637: Performed by: NURSE PRACTITIONER

## 2024-08-03 PROCEDURE — 250N000013 HC RX MED GY IP 250 OP 250 PS 637: Performed by: INTERNAL MEDICINE

## 2024-08-03 PROCEDURE — 93010 ELECTROCARDIOGRAM REPORT: CPT | Performed by: INTERNAL MEDICINE

## 2024-08-03 PROCEDURE — 210N000002 HC R&B HEART CARE

## 2024-08-03 PROCEDURE — 85025 COMPLETE CBC W/AUTO DIFF WBC: CPT | Performed by: INTERNAL MEDICINE

## 2024-08-03 PROCEDURE — 99233 SBSQ HOSP IP/OBS HIGH 50: CPT | Performed by: INTERNAL MEDICINE

## 2024-08-03 PROCEDURE — 93005 ELECTROCARDIOGRAM TRACING: CPT

## 2024-08-03 PROCEDURE — 80069 RENAL FUNCTION PANEL: CPT | Performed by: INTERNAL MEDICINE

## 2024-08-03 PROCEDURE — 250N000013 HC RX MED GY IP 250 OP 250 PS 637: Performed by: PHYSICIAN ASSISTANT

## 2024-08-03 PROCEDURE — 36415 COLL VENOUS BLD VENIPUNCTURE: CPT | Performed by: INTERNAL MEDICINE

## 2024-08-03 PROCEDURE — 250N000012 HC RX MED GY IP 250 OP 636 PS 637: Performed by: NURSE PRACTITIONER

## 2024-08-03 PROCEDURE — 83735 ASSAY OF MAGNESIUM: CPT | Performed by: INTERNAL MEDICINE

## 2024-08-03 PROCEDURE — 97110 THERAPEUTIC EXERCISES: CPT | Mod: GO

## 2024-08-03 RX ORDER — TORSEMIDE 20 MG/1
60 TABLET ORAL
Status: DISCONTINUED | OUTPATIENT
Start: 2024-08-03 | End: 2024-08-05

## 2024-08-03 RX ORDER — SPIRONOLACTONE 25 MG/1
25 TABLET ORAL DAILY
Status: DISCONTINUED | OUTPATIENT
Start: 2024-08-03 | End: 2024-08-09 | Stop reason: HOSPADM

## 2024-08-03 RX ORDER — TORSEMIDE 20 MG/1
60 TABLET ORAL
Status: DISCONTINUED | OUTPATIENT
Start: 2024-08-03 | End: 2024-08-03

## 2024-08-03 RX ADMIN — CETIRIZINE HYDROCHLORIDE 10 MG: 10 TABLET, FILM COATED ORAL at 20:12

## 2024-08-03 RX ADMIN — POLYETHYLENE GLYCOL 3350 17 G: 17 POWDER, FOR SOLUTION ORAL at 09:31

## 2024-08-03 RX ADMIN — TORSEMIDE 60 MG: 20 TABLET ORAL at 20:12

## 2024-08-03 RX ADMIN — ALLOPURINOL 300 MG: 300 TABLET ORAL at 09:31

## 2024-08-03 RX ADMIN — SOTALOL HYDROCHLORIDE 80 MG: 80 TABLET ORAL at 22:06

## 2024-08-03 RX ADMIN — PREDNISONE 10 MG: 10 TABLET ORAL at 09:31

## 2024-08-03 RX ADMIN — Medication 2 CAPSULE: at 11:22

## 2024-08-03 RX ADMIN — MICONAZOLE NITRATE: 2 POWDER TOPICAL at 11:22

## 2024-08-03 RX ADMIN — FINASTERIDE 1 MG: 1 TABLET, FILM COATED ORAL at 09:31

## 2024-08-03 RX ADMIN — TORSEMIDE 60 MG: 20 TABLET ORAL at 11:48

## 2024-08-03 RX ADMIN — RIVAROXABAN 20 MG: 20 TABLET, FILM COATED ORAL at 16:27

## 2024-08-03 RX ADMIN — SOTALOL HYDROCHLORIDE 80 MG: 80 TABLET ORAL at 11:22

## 2024-08-03 RX ADMIN — SPIRONOLACTONE 25 MG: 25 TABLET ORAL at 11:48

## 2024-08-03 RX ADMIN — MICONAZOLE NITRATE: 2 POWDER TOPICAL at 20:12

## 2024-08-03 ASSESSMENT — ACTIVITIES OF DAILY LIVING (ADL)
ADLS_ACUITY_SCORE: 25
ADLS_ACUITY_SCORE: 25
ADLS_ACUITY_SCORE: 24
ADLS_ACUITY_SCORE: 25
ADLS_ACUITY_SCORE: 24
ADLS_ACUITY_SCORE: 25
ADLS_ACUITY_SCORE: 25
ADLS_ACUITY_SCORE: 24
ADLS_ACUITY_SCORE: 25

## 2024-08-03 NOTE — PLAN OF CARE
Pt A&O x4. On room air, PIV SL. Independent in the room. Voiding in the urinal. 2gram NA diet and 2L FR. Denies any pain or SOB. K, mag, and phos high protocol. Plan to continue monitoring  kidney function.

## 2024-08-03 NOTE — PLAN OF CARE
Goal Outcome Evaluation:      Plan of Care Reviewed With: patient      Up in the room independent, tolerated food, denies pain, VSS, RA. Wound care done this morning, placed edema wear on lower legs. Continue to monitor.

## 2024-08-03 NOTE — PLAN OF CARE
Goal Outcome Evaluation:  A&Ox4. Up independently in room. Tele SR. VSS, on room air. C pap at night. Dyspnea with activity.    Small bruise on left groin from right heart cath. Miconazole powder to rash on right abdominal fold. Polymen on right lateral shin. Edema wear socks on day time, off at night.   K, Mg, Ph protocol. Moderate consistent CHO/ 2 g Na/low fat diet with 2 L FR.   Patient hopes to discharge home tomorrow.

## 2024-08-03 NOTE — PLAN OF CARE
Goal Outcome Evaluation: Pt is A&Ox4, denies pain, VSS and on tele SB, on RA and bases fine crackles, CPA for HS,  up independent in room. Plan to monitor kidney function.      Plan of Care Reviewed With: patient    Overall Patient Progress: no changeOverall Patient Progress: no change

## 2024-08-03 NOTE — PROGRESS NOTES
I reviewed his chart.   Excellent urine output, but net only ~ 500 ml last 24 hrs.  Scr is better.  BP improved.     Plan:  # He still has 3+ pitting edema and +30 lbs above dry weight. ROBIN was likely due to transient hypotension from cardioversion. Would do Bumex 2 mg IV BID plus/minus thiazide diuretic. Goal net 2-3 liters daily as needed.  Please call with questions or concerns.

## 2024-08-03 NOTE — PROGRESS NOTES
[facility]    Internal Medicine Hospitalist Progress Note  08/03/2024  I evaluated patient on the above date.    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    Acute on chronic diastolic CHF exacerbation (HFpEF): Improved  Hypertension (benign essential).  AS, moderate.  [PTA: losartan 50 mg daily; metoprolol 12.5 mg BID; spironolactone 25 mg daily; torsemide 80 mg daily.]  * Echo 5/2024 showed LVEF 55%, no obvious regional wall motion abnormalities; RV borderline dilated with normal systolic function; moderate AS, mild AR.  * On admit, noted primary symptoms of significant weight gain, up to 310 pounds, diffuse edema, dyspnea. Felt to be multifactorial etiology including ongoing corticosteroid use as well as PAF.   * Wt 310 lbs on admit (notes baseline wt around 270 lbs). CXR showed mild bibasilar atelectasis/scar, no pleural effusion, normal pulmonary vascularity. Given bumetanide and chlorothiazide IV dose and started on bumetanide gtt on admit. Cardiology consulted on admit.     -He was on bumetanide infusion till 8/1/2024, received Diuril on 7/27, no re-dosing on 7/28 due to bump in creatinine.  Now started on oral torsemide 60 mg twice daily and IV Bumex discontinued on 8/1/2024.  - Continued losartan and metoprolol.  Losartan placed on hold on 7/28 due to worsening kidney function.  - Hold PTA spironolactone and torsemide for now.  -Diuresing well.  Right heart cath done on 7/31/2024,.  Shows moderately  elevated right heart filling pressures.  Normal left heart filling pressure.  Wedge pressure 15 mmHg.      EP consulted-see below.  - Continue 2 g sodium diet and 2 L fluid restriction.  - Continue to monitor i/o's, daily wts.  - Appreciate Cardiology help.  - AF management as noted.  Patient IV Bumex discontinued on 8/1/2024, unable to take oral torsemide Aldactone giving low blood pressure and worsening kidney function at this time.  Very slight increase to 303 from 296 pounds.  Kidney functions improved, no more hypotensive, I will resume his torsemide 60 mg twice daily and Aldactone 5 mg daily.  Will see how he does.      Persistent A-fib, felt to be contributing to acute CHF.  Status post DC cardioversion: To normal sinus rhythm  * Follows with Dr. Day. H/o multiple antiarrhythmics including flecainide which he failed and amiodarone resulted in pneumonitis. H/o multiple ablations and cardioversion which have subsequently failed.   * Cardiology consulted on admit.  - Continue to monitor on telemetry.  - Continue PTA metoprolol and rivaroxaban.  -EP consulted on 7/30: Started on sotalol 120 Mg twice daily .  Dose was decreased to 80 Mg twice daily on 7/31 given rising creatinine and prolonged QT on EKG.   He is not successful DC cardioversion to normal sinus rhythm.  With sinus bradycardia initially without any pauses.  Given the prolonged QTc interaction sotalol and Bactrim, EP cardiology has discontinued the Bactrim at this time.  Patient remained in sinus rhythm at this time.    Recent pneumonitis, suspect from relapse of inflammatory lung process (h/o pulmonary toxicity from amiodarone versus other inflammatory process), on steroids.  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 Follow-up CT 3/2024 showed bilateral interstitial infiltrates with likely component of fibrosis. Recent hospitalization 5/2024 with issues including relapsed inflammatory lung process, started on  high dose steroids with slow taper by Pulmonology.  * Initial presentation as above. CXR showed mild bibasilar atelectasis/scar, no pleural effusion, normal pulmonary vascularity. O2 sats 90's on RA on admit.  - Continue prednisone 10 mg daily through 8/8/2024; then 5 mg daily starting 8/9/2024 for 60 days.  - Continue sulfamethoxazole-trimethoprim prophylaxis - monitor renal function closely, may need to hold if cr worsens.  - Follow-up with Pulmonology (pending PFTs and repeat CT for 8/2024).  He is on prolonged course of prednisone for pneumonitis as above, dose of 10 mg will soon be switched to less than 5 mg.  Has significant reaction with Bactrim with sotalol both can cause increase in QTc prolongation.    Exertional dyspnea, suspect multifactorial including CHF, recent pneumonitis.  - Continue to treat other issues as noted.    BOWEN with variable compliance secondary to frequent nocturia.  - Continue CPAP with 3L O2 while sleeping.    ROBIN on CKD likely stage III.  * Baseline creatinine 1.5.  * Cr 1.56 on admit. Started on diuresis as above.  Went up to 2.09 but improved again at 1.85 on 7/27.  Went up again to 2.03 on 7/28.  Held off on Diuril on 7/28,  Continue Bumex drip.  Creatinine with fluctuation daily, now slowly rising.  Recent Labs   Lab 08/03/24  0616 08/02/24  0531 08/01/24  0606 07/31/24  0644 07/30/24  0651 07/29/24  0552   CR 1.41* 2.31* 1.78* 1.75* 1.63* 1.58*   Estimated Creatinine Clearance: 64.4 mL/min (A) (based on SCr of 1.41 mg/dL (H)).  - Continue to monitor BMP  - Avoid nephrotoxic medications.  Creatinine improved to 1.78 on 8.24, despite holding diuretics his kidney function increased to 2.31 today.  Acute on chronic renal failure likely secondary to hypertension, and being on losartan and diuretics.  I will continue hold losartan today, creatinine improved to 1.4  Resume oral torsemide and Aldactone at this time.    Avoid hypotension, most likely he was hypotensive postprocedure on  "8/1/2024, most likely the cause of his worsening kidney function.    Hypokalemia and hypomagnesemia.  * Continued on scheduled potassium on admit.  Recent Labs   Lab 08/03/24  0616 08/02/24  0531 08/01/24  0606 07/31/24  0644 07/30/24  1524 07/30/24  1417 07/30/24  0651 07/29/24  1457 07/29/24  0552 07/28/24  1257 07/28/24  0604   POTASSIUM 4.8 5.0 3.5 3.7 3.8 4.3 3.1*   < > 3.1*   < > 3.3*   MAG 2.3 2.5* 1.9 1.9  --   --  1.9  --  1.9  --  1.9   PHOS 4.0 3.8 4.2 3.8  --   --  3.8  --  3.8  --  4.5    < > = values in this interval not displayed.   - Continue scheduled potassium chloride 60 meq BID for now; monitor closely with ROBIN/CKD.  - Continue PRN K and Mg replacement - monitor K levels closely with ROBIN/CKD.    Alcohol use disorder with intermittent spells of sobriety.  * On admit, noted that pt had a few seltzer drinks the past week PTA.  - Cessation encouraged on admit.    Bell's palsy with asymmetric smile.  - Noted.    Lung nodules  * Follows with outpatient Pulmonology.  - Follow-up outpatient.    BPH.  - Continue PTA finasteride.    RLE wound.  * Noted on admit. WOC RN consulted.  - Continue local wound cares per WOC RN.    Severe obesity.  * Body mass index is 46.16 kg/m .  - Needs to continue to pursue aggressive dietary and lifestyle modifications.      Clinically Significant Risk Factors          # Hypocalcemia: Lowest Ca = 8.6 mg/dL in last 2 days, will monitor and replace as appropriate        # Coagulation Defect: INR = 2.47 (Ref range: 0.85 - 1.15) and/or PTT = N/A, will monitor for bleeding    # Hypertension: Noted on problem list  # Acute heart failure with preserved ejection fraction: heart failure noted on problem list, last echo with EF >50%, and receiving IV diuretics           # Severe Obesity: Estimated body mass index is 46.16 kg/m  as calculated from the following:    Height as of an earlier encounter on 7/25/24: 1.727 m (5' 8\").    Weight as of this encounter: 137.7 kg (303 lb 9.6 oz).   "      # Financial/Environmental Concerns: none         Diet: Fluid restriction 2000 ML FLUID  Advance Diet as Tolerated: Regular Diet Adult; Moderate Consistent Carb (60 g CHO per Meal) Diet; 2 gm NA Diet; Low Saturated Fat Diet    Prophylaxis: PCD's, ambulation, and is on DOAC.  Palmer Catheter: Not present  Lines: None     Code Status: Full Code    Disposition Plan   Medically Ready for Discharge: Anticipated Tomorrow, if you tolerate diuretics, continue improved body weight and kidney function blood pressure remained stable    Expected discharge to home pending stability/improvement/resolution of above acute issues.    Entered: Narendra Thomas MD 08/03/2024, 1:34 PM       Interval History   Patient seen and evaluated this morning, feeling much better, blood pressure is stable, no headache dizziness lightheadedness no fever chills or cough.  Have some lower extremity swelling.  Weight remains stable around 303 pounds compared to yesterday, but overall much higher than his baseline.    No other significant event overnight      * Data reviewed today: I reviewed all new labs and imaging over the last 24 hours. I personally reviewed labs    Physical Exam   Most recent vitals:   , Blood pressure 123/78, pulse 59, temperature 98.8  F (37.1  C), temperature source Oral, resp. rate 19, weight 137.7 kg (303 lb 9.6 oz), SpO2 94%. O2 Device: None (Room air)    Vitals:    08/01/24 0625 08/02/24 0533 08/03/24 0620   Weight: 134.5 kg (296 lb 9.6 oz) 137.7 kg (303 lb 9.6 oz) 137.7 kg (303 lb 9.6 oz)     Vital signs with ranges:  Temp:  [97.6  F (36.4  C)-98.8  F (37.1  C)] 98.8  F (37.1  C)  Pulse:  [52-60] 59  Resp:  [16-19] 19  BP: (109-134)/(70-83) 123/78  SpO2:  [91 %-96 %] 94 %  Patient Vitals for the past 24 hrs:   BP Temp Temp src Pulse Resp SpO2 Weight   08/03/24 0745 123/78 98.8  F (37.1  C) Oral 59 19 94 % --   08/03/24 0620 -- -- -- -- -- -- 137.7 kg (303 lb 9.6 oz)   08/03/24 0010 120/79 97.6  F (36.4  C) Oral -- 16 91 %  --   08/02/24 2200 117/72 -- -- 57 -- -- --   08/02/24 1921 109/70 97.9  F (36.6  C) Oral 55 16 93 % --   08/02/24 1920 109/70 -- -- 55 -- -- --   08/02/24 1600 121/74 97.7  F (36.5  C) Oral 52 16 96 % --   08/02/24 1400 134/83 -- -- 60 16 -- --     I/O's last 24 hours:  I/O last 3 completed shifts:  In: 1349 [P.O.:1340; I.V.:9]  Out: 1900 [Urine:1900]    Constitutional: awake, alert, oriented, pleasant, conversant, sitting up in chair  Cardiovascular: Irregular, systolic murmur noted, bilateral lower extremity 2+ pitting edema noted  Lungs: diminished in the bases, basal crackles positive  Gastrointestinal/Abdomen: soft, non-tender, non-distended, positive bowel sounds  Skin: bilateral lower extremities with 1+ pitting edema  Neurologic: Alert and conversing appropriately, moving all extremities, fluent speech  Psychiatric: Calm and pleasant       Data    Labs reviewed.  Recent Labs   Lab 08/03/24 0616 08/02/24 0531 08/01/24  0606   WBC 10.2 11.4*  --    HGB 12.6* 12.9*  --    * 102*  --     222  --    INR  --   --  2.47*    136 139   POTASSIUM 4.8 5.0 3.5   CHLORIDE 100 99 96*   CO2 32* 31* 34*   BUN 33.5* 50.5* 42.2*   CR 1.41* 2.31* 1.78*   ANIONGAP 5* 6* 9   ASHELY 9.2 8.6* 8.8   GLC 95 128* 113*   ALBUMIN 3.6 3.6  --      Recent Labs   Lab Test 05/05/24  1515 05/05/24  1003 02/11/24  0848 02/11/24  0220 11/28/23  0623   NT-PROBNP, INPATIENT  --  1,202*  --  442 160   TROPONIN T HIGH SENSITIVITY 85* 90* 59* 57*  --      Recent Labs   Lab 08/03/24 0616 08/02/24  0531 08/01/24  0606 07/31/24  0644 07/30/24  0651 07/29/24  0552   GLC 95 128* 113* 127* 113* 119*     Recent Labs   Lab Test 02/25/19  0756 10/12/17  0814   A1C 5.7* 5.7       Recent Labs   Lab 08/01/24  0606   INR 2.47*     Recent Labs   Lab 08/03/24  0616 08/02/24  0531 07/31/24  1222   WBC 10.2 11.4*  --    LACT  --   --  0.8       MICRO:  CULTURES (INCLUDING BLOOD AND URINE):  No lab results found in last 7 days.    No results  found for this or any previous visit (from the past 24 hour(s)).          Medications   All medications were reviewed. MAR.    Infusions:  Current Facility-Administered Medications   Medication Dose Route Frequency Provider Last Rate Last Admin    - MEDICATION INSTRUCTIONS -   Does not apply DOES NOT GO TO Courtney Blackmon APRN CNP        Continuing ACE inhibitor/ARB/ARNI from home medication list OR ACE inhibitor/ARB/ARNI order already placed during this visit   Does not apply DOES NOT GO TO Courtney Blackmon APRN CNP        Continuing beta blocker from home medication list OR beta blocker order already placed during this visit   Does not apply DOES NOT GO TO Courtney Blackmon APRN CNP        Give 1/2 of usual dose of LONG ACTING insulin AM of procedure IF diabetic   Does not apply Continuous PRN Shanthi Wilkins APRN CNP        May take oral meds with sip of water, the morning of Cardioversion procedure.   Does not apply Continuous PRN RedShanthi trujillo APRN CNP        Patient is already receiving anticoagulation with heparin, enoxaparin (LOVENOX), warfarin (COUMADIN)  or other anticoagulant medication   Does not apply Continuous PRN Courtney Roy APRN CNP        [Held by provider] sodium chloride 0.9 % infusion   Intravenous Continuous Ajay Almazan MD   Stopped at 08/01/24 1022     Scheduled Medications:  Current Facility-Administered Medications   Medication Dose Route Frequency Provider Last Rate Last Admin    allopurinol (ZYLOPRIM) tablet 300 mg  300 mg Oral Daily Courtney Roy APRN CNP   300 mg at 08/03/24 0931    cetirizine (zyrTEC) tablet 10 mg  10 mg Oral QPM Courtney Roy APRN CNP   10 mg at 08/02/24 2202    finasteride (PROPECIA) tablet 1 mg  1 mg Oral Daily Courtney Roy APRN CNP   1 mg at 08/03/24 0931    [Held by provider] losartan (COZAAR) tablet 50 mg  50 mg Oral Daily Courtney Roy APRN CNP   50 mg at 08/01/24 1344    miconazole (MICATIN) 2 % powder   Topical BID Chinmay  Masha De La Cruz MD   Given at 08/03/24 1122    polyethylene glycol (MIRALAX) Packet 17 g  17 g Oral Daily Courtney Roy APRN CNP   17 g at 08/03/24 0931    [Held by provider] potassium chloride pushpa ER (KLOR-CON M20) CR tablet 60 mEq  60 mEq Oral TID Malika Blanco PA-C   60 mEq at 08/01/24 2115    predniSONE (DELTASONE) tablet 10 mg  10 mg Oral Daily Courtney Roy APRN CNP   10 mg at 08/03/24 0931    Followed by    [START ON 8/9/2024] predniSONE (DELTASONE) tablet 5 mg  5 mg Oral Daily Courtney Roy APRN CNP        psyllium (METAMUCIL/KONSYL) capsule 1-2 capsule  1-2 capsule Oral Daily Courtney Roy APRN CNP   2 capsule at 08/03/24 1122    rivaroxaban ANTICOAGULANT (XARELTO) tablet 20 mg  20 mg Oral Daily with supper Courtney Roy APRN CNP   20 mg at 08/02/24 1610    sodium chloride (PF) 0.9% PF flush 3 mL  3 mL Intracatheter Q8H Courtney Roy APRN CNP   3 mL at 08/03/24 1122    sotalol (BETAPACE) tablet 80 mg  80 mg Oral Q12H [facility] Natalie Campos PA-C   80 mg at 08/03/24 1122    spironolactone (ALDACTONE) tablet 25 mg  25 mg Oral Daily Narendra Thomas MD   25 mg at 08/03/24 1148    torsemide (DEMADEX) tablet 60 mg  60 mg Oral BID Narendra Thomas MD         PRN Medications:  Current Facility-Administered Medications   Medication Dose Route Frequency Provider Last Rate Last Admin    - MEDICATION INSTRUCTIONS -   Does not apply DOES NOT GO TO Courtney Blackmon APRN CNP        acetaminophen (TYLENOL) tablet 650 mg  650 mg Oral Q4H PRN Courtney Roy APRN CNP        Or    acetaminophen (TYLENOL) Suppository 650 mg  650 mg Rectal Q4H PRN Courtney Roy APRN CNP        calcium carbonate (TUMS) chewable tablet 1,000 mg  1,000 mg Oral 4x Daily PRN Courtney Roy APRN CNP        Continuing ACE inhibitor/ARB/ARNI from home medication list OR ACE inhibitor/ARB/ARNI order already placed during this visit   Does not apply DOES NOT GO TO Courtney Blackmon APRN CNP         Continuing beta blocker from home medication list OR beta blocker order already placed during this visit   Does not apply DOES NOT GO TO Courtney Blackmon APRN CNP        Give 1/2 of usual dose of LONG ACTING insulin AM of procedure IF diabetic   Does not apply Continuous PRN Shanthi Wilkins APRN CNP        HOLD Digoxin (Lanoxin) AM of procedure IF on digoxin   Does not apply HOLD Shanthi Wilkins APRN CNP        HOLD Insulin - RAPID/SHORT acting AM of procedure IF diabetic   Does not apply HOLD Shanthi Wilkins APRN CNP        HOLD Insulin - REGULAR AM of procedure IF diabetic   Does not apply HOLD Shanthi Wilkins APRN CNP        HOLD Oral hypoglycemics AM of procedure IF diabetic   Does not apply HOLD Shanthi Wilkins APRN CNP        lidocaine (LMX4) cream   Topical Q1H PRN Courtney Roy APRN CNP        lidocaine 1 % 0.1-1 mL  0.1-1 mL Other Q1H PRCourtney Diggs APRN CNP        magnesium sulfate 2 g in 50 mL sterile water intermittent infusion  2 g Intravenous Once PRN Shanthi Wilkins APRN CNP        May take oral meds with sip of water, the morning of Cardioversion procedure.   Does not apply Continuous PRN Shanthi Wilkins APRN CNP        melatonin tablet 1 mg  1 mg Oral At Bedtime Courtney Stewart APRN CNP        Patient is already receiving anticoagulation with heparin, enoxaparin (LOVENOX), warfarin (COUMADIN)  or other anticoagulant medication   Does not apply Continuous PRN Courtney Roy APRN CNP        potassium chloride pushpa ER (KLOR-CON M20) CR tablet 20 mEq  20 mEq Oral Once PRN Shanthi Wilkins APRN CNP        potassium chloride pushpa ER (KLOR-CON M20) CR tablet 40 mEq  40 mEq Oral Once PRN Shanthi Wilkins APRN CNP        prochlorperazine (COMPAZINE) injection 5 mg  5 mg Intravenous Q6H PRN Courtney Roy APRN CNP        Or    prochlorperazine (COMPAZINE) tablet 5 mg  5 mg Oral Q6H PRN Courtney Roy APRN CNP        Or    prochlorperazine (COMPAZINE) suppository 12.5 mg  12.5 mg Rectal Q12H  PRN Courtney Roy APRN CNP        senna-docusate (SENOKOT-S/PERICOLACE) 8.6-50 MG per tablet 1 tablet  1 tablet Oral BID PRN Courtney Roy APRN CNP        Or    senna-docusate (SENOKOT-S/PERICOLACE) 8.6-50 MG per tablet 2 tablet  2 tablet Oral BID ANGELICAN Courtney Roy APRN CNP        sodium chloride (PF) 0.9% PF flush 3 mL  3 mL Intravenous Q1H PRN Shanthi Wilkins APRN CNP        sodium chloride (PF) 0.9% PF flush 3 mL  3 mL Intracatheter q1 min prCourtney Gresham APRN CNP

## 2024-08-04 ENCOUNTER — APPOINTMENT (OUTPATIENT)
Dept: OCCUPATIONAL THERAPY | Facility: CLINIC | Age: 74
DRG: 286 | End: 2024-08-04
Attending: STUDENT IN AN ORGANIZED HEALTH CARE EDUCATION/TRAINING PROGRAM
Payer: COMMERCIAL

## 2024-08-04 LAB
ANION GAP SERPL CALCULATED.3IONS-SCNC: 8 MMOL/L (ref 7–15)
ATRIAL RATE - MUSE: 68 BPM
BUN SERPL-MCNC: 35.3 MG/DL (ref 8–23)
CALCIUM SERPL-MCNC: 8.6 MG/DL (ref 8.8–10.4)
CHLORIDE SERPL-SCNC: 97 MMOL/L (ref 98–107)
CREAT SERPL-MCNC: 1.41 MG/DL (ref 0.67–1.17)
DIASTOLIC BLOOD PRESSURE - MUSE: NORMAL MMHG
EGFRCR SERPLBLD CKD-EPI 2021: 53 ML/MIN/1.73M2
GLUCOSE SERPL-MCNC: 100 MG/DL (ref 70–99)
HCO3 SERPL-SCNC: 34 MMOL/L (ref 22–29)
HOLD SPECIMEN: NORMAL
INTERPRETATION ECG - MUSE: NORMAL
MAGNESIUM SERPL-MCNC: 1.8 MG/DL (ref 1.7–2.3)
P AXIS - MUSE: 81 DEGREES
PHOSPHATE SERPL-MCNC: 4.6 MG/DL (ref 2.5–4.5)
POTASSIUM SERPL-SCNC: 3.2 MMOL/L (ref 3.4–5.3)
POTASSIUM SERPL-SCNC: 3.3 MMOL/L (ref 3.4–5.3)
POTASSIUM SERPL-SCNC: 3.7 MMOL/L (ref 3.4–5.3)
PR INTERVAL - MUSE: 204 MS
QRS DURATION - MUSE: 86 MS
QT - MUSE: 436 MS
QTC - MUSE: 463 MS
R AXIS - MUSE: 33 DEGREES
SODIUM SERPL-SCNC: 139 MMOL/L (ref 135–145)
SYSTOLIC BLOOD PRESSURE - MUSE: NORMAL MMHG
T AXIS - MUSE: 77 DEGREES
VENTRICULAR RATE- MUSE: 68 BPM

## 2024-08-04 PROCEDURE — 36415 COLL VENOUS BLD VENIPUNCTURE: CPT | Performed by: HOSPITALIST

## 2024-08-04 PROCEDURE — 83735 ASSAY OF MAGNESIUM: CPT | Performed by: INTERNAL MEDICINE

## 2024-08-04 PROCEDURE — 80048 BASIC METABOLIC PNL TOTAL CA: CPT | Performed by: HOSPITALIST

## 2024-08-04 PROCEDURE — 250N000013 HC RX MED GY IP 250 OP 250 PS 637: Performed by: PHYSICIAN ASSISTANT

## 2024-08-04 PROCEDURE — 97110 THERAPEUTIC EXERCISES: CPT | Mod: GO

## 2024-08-04 PROCEDURE — 250N000013 HC RX MED GY IP 250 OP 250 PS 637: Performed by: NURSE PRACTITIONER

## 2024-08-04 PROCEDURE — 99223 1ST HOSP IP/OBS HIGH 75: CPT | Performed by: INTERNAL MEDICINE

## 2024-08-04 PROCEDURE — 84100 ASSAY OF PHOSPHORUS: CPT | Performed by: INTERNAL MEDICINE

## 2024-08-04 PROCEDURE — 250N000012 HC RX MED GY IP 250 OP 636 PS 637: Performed by: NURSE PRACTITIONER

## 2024-08-04 PROCEDURE — 84132 ASSAY OF SERUM POTASSIUM: CPT | Performed by: INTERNAL MEDICINE

## 2024-08-04 PROCEDURE — 250N000013 HC RX MED GY IP 250 OP 250 PS 637: Performed by: INTERNAL MEDICINE

## 2024-08-04 PROCEDURE — 93005 ELECTROCARDIOGRAM TRACING: CPT

## 2024-08-04 PROCEDURE — 250N000011 HC RX IP 250 OP 636: Performed by: INTERNAL MEDICINE

## 2024-08-04 PROCEDURE — 93010 ELECTROCARDIOGRAM REPORT: CPT | Performed by: INTERNAL MEDICINE

## 2024-08-04 PROCEDURE — 99233 SBSQ HOSP IP/OBS HIGH 50: CPT | Performed by: INTERNAL MEDICINE

## 2024-08-04 PROCEDURE — 210N000002 HC R&B HEART CARE

## 2024-08-04 PROCEDURE — 36415 COLL VENOUS BLD VENIPUNCTURE: CPT | Performed by: INTERNAL MEDICINE

## 2024-08-04 RX ORDER — MAGNESIUM OXIDE 400 MG/1
400 TABLET ORAL EVERY 4 HOURS
Status: COMPLETED | OUTPATIENT
Start: 2024-08-04 | End: 2024-08-04

## 2024-08-04 RX ORDER — ZOLPIDEM TARTRATE 5 MG/1
5 TABLET ORAL
Status: DISCONTINUED | OUTPATIENT
Start: 2024-08-04 | End: 2024-08-09 | Stop reason: HOSPADM

## 2024-08-04 RX ORDER — POTASSIUM CHLORIDE 1500 MG/1
40 TABLET, EXTENDED RELEASE ORAL ONCE
Status: COMPLETED | OUTPATIENT
Start: 2024-08-04 | End: 2024-08-04

## 2024-08-04 RX ORDER — BUMETANIDE 0.25 MG/ML
2 INJECTION INTRAMUSCULAR; INTRAVENOUS
Status: DISCONTINUED | OUTPATIENT
Start: 2024-08-04 | End: 2024-08-06

## 2024-08-04 RX ORDER — POTASSIUM CHLORIDE 1500 MG/1
20 TABLET, EXTENDED RELEASE ORAL ONCE
Status: COMPLETED | OUTPATIENT
Start: 2024-08-04 | End: 2024-08-04

## 2024-08-04 RX ADMIN — MICONAZOLE NITRATE: 2 POWDER TOPICAL at 12:16

## 2024-08-04 RX ADMIN — Medication 2 CAPSULE: at 10:07

## 2024-08-04 RX ADMIN — Medication 400 MG: at 15:44

## 2024-08-04 RX ADMIN — PREDNISONE 10 MG: 10 TABLET ORAL at 10:06

## 2024-08-04 RX ADMIN — FINASTERIDE 1 MG: 1 TABLET, FILM COATED ORAL at 10:06

## 2024-08-04 RX ADMIN — POTASSIUM CHLORIDE 20 MEQ: 1500 TABLET, EXTENDED RELEASE ORAL at 23:31

## 2024-08-04 RX ADMIN — RIVAROXABAN 20 MG: 20 TABLET, FILM COATED ORAL at 18:09

## 2024-08-04 RX ADMIN — SPIRONOLACTONE 25 MG: 25 TABLET ORAL at 10:21

## 2024-08-04 RX ADMIN — SOTALOL HYDROCHLORIDE 80 MG: 80 TABLET ORAL at 22:57

## 2024-08-04 RX ADMIN — POLYETHYLENE GLYCOL 3350 17 G: 17 POWDER, FOR SOLUTION ORAL at 10:07

## 2024-08-04 RX ADMIN — TORSEMIDE 60 MG: 20 TABLET ORAL at 10:05

## 2024-08-04 RX ADMIN — BUMETANIDE 2 MG: 0.25 INJECTION, SOLUTION INTRAMUSCULAR; INTRAVENOUS at 15:44

## 2024-08-04 RX ADMIN — Medication 400 MG: at 10:07

## 2024-08-04 RX ADMIN — SOTALOL HYDROCHLORIDE 80 MG: 80 TABLET ORAL at 10:21

## 2024-08-04 RX ADMIN — POTASSIUM CHLORIDE 40 MEQ: 1500 TABLET, EXTENDED RELEASE ORAL at 18:10

## 2024-08-04 RX ADMIN — ALLOPURINOL 300 MG: 300 TABLET ORAL at 10:06

## 2024-08-04 RX ADMIN — CETIRIZINE HYDROCHLORIDE 10 MG: 10 TABLET, FILM COATED ORAL at 21:59

## 2024-08-04 RX ADMIN — MICONAZOLE NITRATE: 2 POWDER TOPICAL at 21:59

## 2024-08-04 RX ADMIN — BUMETANIDE 2 MG: 0.25 INJECTION, SOLUTION INTRAMUSCULAR; INTRAVENOUS at 12:10

## 2024-08-04 RX ADMIN — ZOLPIDEM TARTRATE 5 MG: 5 TABLET, COATED ORAL at 22:57

## 2024-08-04 RX ADMIN — POTASSIUM CHLORIDE 40 MEQ: 1500 TABLET, EXTENDED RELEASE ORAL at 10:07

## 2024-08-04 ASSESSMENT — ACTIVITIES OF DAILY LIVING (ADL)
ADLS_ACUITY_SCORE: 22
ADLS_ACUITY_SCORE: 22
ADLS_ACUITY_SCORE: 24
ADLS_ACUITY_SCORE: 24
ADLS_ACUITY_SCORE: 22
ADLS_ACUITY_SCORE: 24
ADLS_ACUITY_SCORE: 25
ADLS_ACUITY_SCORE: 22
ADLS_ACUITY_SCORE: 22
ADLS_ACUITY_SCORE: 24
ADLS_ACUITY_SCORE: 22
ADLS_ACUITY_SCORE: 25
ADLS_ACUITY_SCORE: 24
ADLS_ACUITY_SCORE: 22
ADLS_ACUITY_SCORE: 24
ADLS_ACUITY_SCORE: 22
ADLS_ACUITY_SCORE: 22

## 2024-08-04 NOTE — PLAN OF CARE
Pt A&O x4. Independent in the room, voiding in the urinal. Moderate consistent CHO/ 2 g Na/low fat diet with 2L FR. VSS on room air, PIV SL. Denies pain, or SOB.Tele-SR w/ a couple pauses, HR as low as 31 bpm overnight. Pt hoping to be discharge today.

## 2024-08-04 NOTE — PROVIDER NOTIFICATION
Patient had several pauses during the night while sleeping (longest 2.5 sec), lowest HR 35 while pauses occurred. Hospitalist notified

## 2024-08-04 NOTE — PROGRESS NOTES
Nephrology chart check:    Serum creatinine unchanged at 1.41 today.  This is likely at or near baseline.  Did not receive any diuretics yesterday with still 2.25 L urine output.  He was net -990 cc.  No outputs or weight recorded yet today.    Noted Bumex started at 2 mg twice daily.  Agree with this and will do a formal follow-up tomorrow.    Naresh Delcid, DO

## 2024-08-04 NOTE — PROGRESS NOTES
[facility]    Internal Medicine Hospitalist Progress Note  08/04/2024  I evaluated patient on the above date.    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    Acute on chronic diastolic CHF exacerbation (HFpEF): Improved  Hypertension (benign essential).  AS, moderate.  [PTA: losartan 50 mg daily; metoprolol 12.5 mg BID; spironolactone 25 mg daily; torsemide 80 mg daily.]  * Echo 5/2024 showed LVEF 55%, no obvious regional wall motion abnormalities; RV borderline dilated with normal systolic function; moderate AS, mild AR.  * On admit, noted primary symptoms of significant weight gain, up to 310 pounds, diffuse edema, dyspnea. Felt to be multifactorial etiology including ongoing corticosteroid use as well as PAF.   * Wt 310 lbs on admit (notes baseline wt around 270 lbs). CXR showed mild bibasilar atelectasis/scar, no pleural effusion, normal pulmonary vascularity. Given bumetanide and chlorothiazide IV dose and started on bumetanide gtt on admit. Cardiology consulted on admit.     -He was on bumetanide infusion till 8/1/2024, received Diuril on 7/27, no re-dosing on 7/28 due to bump in creatinine.  Now started on oral torsemide 60 mg twice daily and IV Bumex discontinued on 8/1/2024.  - Continued losartan and metoprolol.  Losartan placed on hold on 7/28 due to worsening kidney function.  - Hold PTA spironolactone and torsemide for now.  -Diuresing well.  Right heart cath done on 7/31/2024,.  Shows moderately  elevated right heart filling pressures.  Normal left heart filling pressure.  Wedge pressure 15 mmHg.      EP consulted-see below.  - Continue 2 g sodium diet and 2 L fluid restriction.  - Continue to monitor i/o's, daily wts.  - Appreciate Cardiology help.  - AF management as noted.  Patient IV Bumex infusion discontinued on 8/1/2024, unable to take oral torsemide Aldactone giving low blood pressure and worsening kidney function at this time.  Very slight increase to 303 from 296 pounds.  Kidney functions improved, no more hypotensive, I will resume his torsemide 60 mg twice daily and Aldactone 5 mg daily.  He did receive his torsemide Aldactone on 8/3/2024, which slightly improved today to 301.  His baseline weight or dry weight is around 265 pounds.  Still fluid overloaded.  I will start him IV Bumex 2 mg twice daily, I asked the cardiology to see him again today.      Persistent A-fib, felt to be contributing to acute CHF.  Status post DC cardioversion: To normal sinus rhythm  * Follows with Dr. Day. H/o multiple antiarrhythmics including flecainide which he failed and amiodarone resulted in pneumonitis. H/o multiple ablations and cardioversion which have subsequently failed.   * Cardiology consulted on admit.  - Continue to monitor on telemetry.  - Continue PTA metoprolol and rivaroxaban.  -EP consulted on 7/30: Started on sotalol 120 Mg twice daily .  Dose was decreased to 80 Mg twice daily on 7/31 given rising creatinine and prolonged QT on EKG.   He is not successful DC cardioversion to normal sinus rhythm.  With sinus bradycardia initially without any pauses.  Given the prolonged QTc interaction sotalol and Bactrim, EP cardiology has discontinued the Bactrim at this time.  Patient remained in sinus rhythm at this time.  Remained in normal sinus rhythm, have nocturnal bradycardia and pauses.  Cardiology to follow    Recent pneumonitis, suspect from relapse of inflammatory lung process (h/o pulmonary  toxicity from amiodarone versus other inflammatory process), on steroids.  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 Follow-up CT 3/2024 showed bilateral interstitial infiltrates with likely component of fibrosis. Recent hospitalization 5/2024 with issues including relapsed inflammatory lung process, started on high dose steroids with slow taper by Pulmonology.  * Initial presentation as above. CXR showed mild bibasilar atelectasis/scar, no pleural effusion, normal pulmonary vascularity. O2 sats 90's on RA on admit.  - Continue prednisone 10 mg daily through 8/8/2024; then 5 mg daily starting 8/9/2024 for 60 days.  - Continue sulfamethoxazole-trimethoprim prophylaxis - monitor renal function closely, may need to hold if cr worsens.  - Follow-up with Pulmonology (pending PFTs and repeat CT for 8/2024).  He is on prolonged course of prednisone for pneumonitis as above, dose of 10 mg will soon be switched to less than 5 mg.  Has significant reaction with Bactrim with sotalol both can cause increase in QTc prolongation.    Exertional dyspnea, suspect multifactorial including CHF, recent pneumonitis.  - Continue to treat other issues as noted.    BOWEN with variable compliance secondary to frequent nocturia.  - Continue CPAP with 3L O2 while sleeping.    ROBIN on CKD likely stage III.  * Baseline creatinine 1.5.  * Cr 1.56 on admit. Started on diuresis as above.  Went up to 2.09 but improved again at 1.85 on 7/27.  Went up again to 2.03 on 7/28.  Held off on Diuril on 7/28,  Continue Bumex drip.  Creatinine with fluctuation daily, now slowly rising.  Recent Labs   Lab 08/04/24  0624 08/03/24  0616 08/02/24  0531 08/01/24  0606 07/31/24  0644 07/30/24  0651   CR 1.41* 1.41* 2.31* 1.78* 1.75* 1.63*   Estimated Creatinine Clearance: 64.2 mL/min (A) (based on SCr of 1.41 mg/dL (H)).  - Continue to monitor BMP  - Avoid nephrotoxic medications.  Creatinine went up to 2.31, need to hold his  losartan, diuretics and Aldactone.  His creatinine now improved to better than his baseline 1.41.  Acute on chronic renal failure likely secondary to hypertension, and being on losartan and diuretics.  Continue Aldactone, resume losartan, start on IV Bumex 2 mg twice daily    Avoid hypotension, most likely he was hypotensive postprocedure on 8/1/2024, most likely the cause of his worsening kidney function.    Hypokalemia and hypomagnesemia.  * Continued on scheduled potassium on admit.  Recent Labs   Lab 08/04/24  0624 08/03/24  0616 08/02/24  0531 08/01/24  0606 07/31/24  0644 07/30/24  1524 07/30/24  1417 07/30/24  0651 07/29/24  1457 07/29/24  0552   POTASSIUM 3.2* 4.8 5.0 3.5 3.7 3.8   < > 3.1*   < > 3.1*   MAG 1.8 2.3 2.5* 1.9 1.9  --   --  1.9  --  1.9   PHOS 4.6* 4.0 3.8 4.2 3.8  --   --  3.8  --  3.8    < > = values in this interval not displayed.   - Continue scheduled potassium chloride 60 meq BID for now; monitor closely with ROBIN/CKD.  - Continue PRN K and Mg replacement - monitor K levels closely with ROBIN/CKD.    Alcohol use disorder with intermittent spells of sobriety.  * On admit, noted that pt had a few seltzer drinks the past week PTA.  - Cessation encouraged on admit.    Bell's palsy with asymmetric smile.  - Noted.    Lung nodules  * Follows with outpatient Pulmonology.  - Follow-up outpatient.    BPH.  - Continue PTA finasteride.    RLE wound.  * Noted on admit. WOC RN consulted.  - Continue local wound cares per WOC RN.    Severe obesity.  * Body mass index is 45.86 kg/m .  - Needs to continue to pursue aggressive dietary and lifestyle modifications.      Clinically Significant Risk Factors        # Hypokalemia: Lowest K = 3.2 mmol/L in last 2 days, will replace as needed   # Hypocalcemia: Lowest Ca = 8.6 mg/dL in last 2 days, will monitor and replace as appropriate           # Hypertension: Noted on problem list  # Acute heart failure with preserved ejection fraction: heart failure noted on  "problem list, last echo with EF >50%, and receiving IV diuretics           # Severe Obesity: Estimated body mass index is 45.86 kg/m  as calculated from the following:    Height as of an earlier encounter on 7/25/24: 1.727 m (5' 8\").    Weight as of this encounter: 136.8 kg (301 lb 9.6 oz).        # Financial/Environmental Concerns: none         Diet: Fluid restriction 2000 ML FLUID  Advance Diet as Tolerated: Regular Diet Adult; Moderate Consistent Carb (60 g CHO per Meal) Diet; 2 gm NA Diet; Low Saturated Fat Diet    Prophylaxis: PCD's, ambulation, and is on DOAC.  Palmer Catheter: Not present  Lines: None     Code Status: Full Code    Disposition Plan   Medically Ready for Discharge: Anticipated in 2-4 Days, he will need IV diuresis is still quite fluid overloaded at this time.    Expected discharge to home pending stability/improvement/resolution of above acute issues.    Entered: Narendra Thomas MD 08/04/2024, 12:49 PM       Interval History   Patient feeling better this morning, he think his lower extremity swelling is improving, he is making urine, denies any fever chills headache dizziness lightheadedness.  He did have nocturnal bradycardia with pauses up to 2.5 seconds.      No other significant event overnight      * Data reviewed today: I reviewed all new labs and imaging over the last 24 hours. I personally reviewed labs    Physical Exam   Most recent vitals:   , Blood pressure 122/76, pulse 63, temperature 98  F (36.7  C), temperature source Oral, resp. rate 19, weight 136.8 kg (301 lb 9.6 oz), SpO2 95%. O2 Device: None (Room air)    Vitals:    08/02/24 0533 08/03/24 0620 08/04/24 0500   Weight: 137.7 kg (303 lb 9.6 oz) 137.7 kg (303 lb 9.6 oz) 136.8 kg (301 lb 9.6 oz)     Vital signs with ranges:  Temp:  [97.8  F (36.6  C)-98.3  F (36.8  C)] 98  F (36.7  C)  Pulse:  [44-82] 63  Resp:  [16-19] 19  BP: (118-143)/(73-93) 122/76  SpO2:  [92 %-95 %] 95 %  Patient Vitals for the past 24 hrs:   BP Temp Temp src " Pulse Resp SpO2 Weight   08/04/24 1005 122/76 -- -- 63 -- -- --   08/04/24 0839 137/81 98  F (36.7  C) Oral 63 19 95 % --   08/04/24 0500 -- -- -- -- -- -- 136.8 kg (301 lb 9.6 oz)   08/04/24 0035 129/73 -- -- -- -- 94 % --   08/04/24 0030 129/73 97.8  F (36.6  C) Oral (!) 44 16 -- --   08/03/24 2205 (!) 143/93 -- -- 64 -- -- --   08/03/24 2203 (!) 143/93 -- -- 63 -- -- --   08/03/24 2010 120/79 98.3  F (36.8  C) Oral 82 18 93 % --   08/03/24 1555 118/77 97.8  F (36.6  C) Oral 63 -- 92 % --     I/O's last 24 hours:  I/O last 3 completed shifts:  In: 1260 [P.O.:1260]  Out: 2250 [Urine:2250]    Constitutional: awake, alert, oriented, pleasant, conversant, sitting up in chair  Cardiovascular: Irregular, systolic murmur noted, bilateral lower extremity 2+ pitting edema noted  Lungs: diminished in the bases, basal crackles positive  Gastrointestinal/Abdomen: soft, non-tender, non-distended, positive bowel sounds  Skin: bilateral lower extremities with 2+ pitting edema  Neurologic: Alert and conversing appropriately, moving all extremities, fluent speech  Psychiatric: Calm and pleasant       Data    Labs reviewed.  Recent Labs   Lab 08/04/24  0624 08/03/24  0616 08/02/24  0531 08/01/24  0606   WBC  --  10.2 11.4*  --    HGB  --  12.6* 12.9*  --    MCV  --  101* 102*  --    PLT  --  223 222  --    INR  --   --   --  2.47*    137 136 139   POTASSIUM 3.2* 4.8 5.0 3.5   CHLORIDE 97* 100 99 96*   CO2 34* 32* 31* 34*   BUN 35.3* 33.5* 50.5* 42.2*   CR 1.41* 1.41* 2.31* 1.78*   ANIONGAP 8 5* 6* 9   ASHELY 8.6* 9.2 8.6* 8.8   * 95 128* 113*   ALBUMIN  --  3.6 3.6  --      Recent Labs   Lab Test 05/05/24  1515 05/05/24  1003 02/11/24  0848 02/11/24  0220 11/28/23  0623   NT-PROBNP, INPATIENT  --  1,202*  --  442 160   TROPONIN T HIGH SENSITIVITY 85* 90* 59* 57*  --      Recent Labs   Lab 08/04/24  0624 08/03/24  0616 08/02/24  0531 08/01/24  0606 07/31/24  0644 07/30/24  0651   * 95 128* 113* 127* 113*     Recent  Labs   Lab Test 02/25/19  0756 10/12/17  0814   A1C 5.7* 5.7       Recent Labs   Lab 08/01/24  0606   INR 2.47*     Recent Labs   Lab 08/03/24  0616 08/02/24  0531 07/31/24  1222   WBC 10.2 11.4*  --    LACT  --   --  0.8       MICRO:  CULTURES (INCLUDING BLOOD AND URINE):  No lab results found in last 7 days.    No results found for this or any previous visit (from the past 24 hour(s)).          Medications   All medications were reviewed. MAR.    Infusions:  Current Facility-Administered Medications   Medication Dose Route Frequency Provider Last Rate Last Admin    - MEDICATION INSTRUCTIONS -   Does not apply DOES NOT GO TO Courtney Blackmon APRN CNP        Continuing ACE inhibitor/ARB/ARNI from home medication list OR ACE inhibitor/ARB/ARNI order already placed during this visit   Does not apply DOES NOT GO TO Courtney Blackmon APRN CNP        Continuing beta blocker from home medication list OR beta blocker order already placed during this visit   Does not apply DOES NOT GO TO Courtney Blackmon APRN CNP        Give 1/2 of usual dose of LONG ACTING insulin AM of procedure IF diabetic   Does not apply Continuous PRN Shanthi Wilkins APRN CNP        May take oral meds with sip of water, the morning of Cardioversion procedure.   Does not apply Continuous PRN Shanthi Wilkins APRN CNP        Patient is already receiving anticoagulation with heparin, enoxaparin (LOVENOX), warfarin (COUMADIN)  or other anticoagulant medication   Does not apply Continuous PRN Courtney Roy APRN CNP        [Held by provider] sodium chloride 0.9 % infusion   Intravenous Continuous Ajay Almazan MD   Stopped at 08/01/24 1022     Scheduled Medications:  Current Facility-Administered Medications   Medication Dose Route Frequency Provider Last Rate Last Admin    allopurinol (ZYLOPRIM) tablet 300 mg  300 mg Oral Daily Courtney Roy APRN CNP   300 mg at 08/04/24 1006    bumetanide (BUMEX) injection 2 mg  2 mg Intravenous BID  Narendra Thomas MD   2 mg at 08/04/24 1210    cetirizine (zyrTEC) tablet 10 mg  10 mg Oral QPM Courtney Roy APRN CNP   10 mg at 08/03/24 2012    finasteride (PROPECIA) tablet 1 mg  1 mg Oral Daily Courtney Roy APRN CNP   1 mg at 08/04/24 1006    losartan (COZAAR) tablet 50 mg  50 mg Oral Daily Trevin Zuleta MD   50 mg at 08/01/24 1344    magnesium oxide (MAG-OX) tablet 400 mg  400 mg Oral Q4H Narendra Thomas MD   400 mg at 08/04/24 1007    miconazole (MICATIN) 2 % powder   Topical BID Masha Moy MD   Given at 08/04/24 1216    polyethylene glycol (MIRALAX) Packet 17 g  17 g Oral Daily Courtney Roy APRN CNP   17 g at 08/04/24 1007    [Held by provider] potassium chloride pushpa ER (KLOR-CON M20) CR tablet 60 mEq  60 mEq Oral TID Malika Blanco PA-C   60 mEq at 08/01/24 2115    predniSONE (DELTASONE) tablet 10 mg  10 mg Oral Daily Courtney Roy APRN CNP   10 mg at 08/04/24 1006    Followed by    [START ON 8/9/2024] predniSONE (DELTASONE) tablet 5 mg  5 mg Oral Daily Courtney Roy APRN CNP        psyllium (METAMUCIL/KONSYL) capsule 1-2 capsule  1-2 capsule Oral Daily Courtney Roy APRN CNP   2 capsule at 08/04/24 1007    rivaroxaban ANTICOAGULANT (XARELTO) tablet 20 mg  20 mg Oral Daily with supper Courtney Roy APRN CNP   20 mg at 08/03/24 1627    sodium chloride (PF) 0.9% PF flush 3 mL  3 mL Intracatheter Q8H Courtney Roy APRN CNP   3 mL at 08/04/24 1014    sotalol (BETAPACE) tablet 80 mg  80 mg Oral Q12H [facility] (08/20) Natalie Campos PA-C   80 mg at 08/04/24 1021    spironolactone (ALDACTONE) tablet 25 mg  25 mg Oral Daily Narendra Thomas MD   25 mg at 08/04/24 1021    [Held by provider] torsemide (DEMADEX) tablet 60 mg  60 mg Oral BID Narendra Thomas MD   60 mg at 08/04/24 1005     PRN Medications:  Current Facility-Administered Medications   Medication Dose Route Frequency Provider Last Rate Last Admin    - MEDICATION INSTRUCTIONS -   Does  not apply DOES NOT GO TO Courtney Blackmon APRN CNP        acetaminophen (TYLENOL) tablet 650 mg  650 mg Oral Q4H PRCourtney Diggs APRN CNP        Or    acetaminophen (TYLENOL) Suppository 650 mg  650 mg Rectal Q4H PRN Courtney Roy APRN CNP        calcium carbonate (TUMS) chewable tablet 1,000 mg  1,000 mg Oral 4x Daily PRN Courtney Roy APRN CNP        Continuing ACE inhibitor/ARB/ARNI from home medication list OR ACE inhibitor/ARB/ARNI order already placed during this visit   Does not apply DOES NOT GO TO Courtney Blackmon APRN CNP        Continuing beta blocker from home medication list OR beta blocker order already placed during this visit   Does not apply DOES NOT GO TO Courtney Balckmon APRN CNP        Give 1/2 of usual dose of LONG ACTING insulin AM of procedure IF diabetic   Does not apply Continuous PRN RedShanthi trujillo APRN CNP        HOLD Digoxin (Lanoxin) AM of procedure IF on digoxin   Does not apply HOLD Shanthi Wilkins APRN CNP        HOLD Insulin - RAPID/SHORT acting AM of procedure IF diabetic   Does not apply HOLD Shanthi Wilkins APRN CNP        HOLD Insulin - REGULAR AM of procedure IF diabetic   Does not apply HOLD Shanthi Wilkins APRN CNP        HOLD Oral hypoglycemics AM of procedure IF diabetic   Does not apply HOLD Shanthi Wilkins APRN CNP        lidocaine (LMX4) cream   Topical Q1H PRN Courtney Roy APRN CNP        lidocaine 1 % 0.1-1 mL  0.1-1 mL Other Q1H PRN Courtney Roy APRN CNP        magnesium sulfate 2 g in 50 mL sterile water intermittent infusion  2 g Intravenous Once PRN Shanthi Wilkins APRN CNP        May take oral meds with sip of water, the morning of Cardioversion procedure.   Does not apply Continuous PRN Shanthi Wilkins APRN CNP        melatonin tablet 1 mg  1 mg Oral At Bedtime PRN Courtney Roy APRN CNP        Patient is already receiving anticoagulation with heparin, enoxaparin (LOVENOX), warfarin (COUMADIN)  or other anticoagulant medication   Does  not apply Continuous PRN Courtney Roy APRN CNP        potassium chloride pushpa ER (KLOR-CON M20) CR tablet 20 mEq  20 mEq Oral Once PRN Shanthi Wilkins APRN CNP        potassium chloride pushpa ER (KLOR-CON M20) CR tablet 40 mEq  40 mEq Oral Once PRN Shanthi Wilkins APRN CNP        prochlorperazine (COMPAZINE) injection 5 mg  5 mg Intravenous Q6H PRN Courtney Roy APRN CNP        Or    prochlorperazine (COMPAZINE) tablet 5 mg  5 mg Oral Q6H PRN Courtney Roy APRN CNP        Or    prochlorperazine (COMPAZINE) suppository 12.5 mg  12.5 mg Rectal Q12H PRN Courtney Roy APRN CNP        senna-docusate (SENOKOT-S/PERICOLACE) 8.6-50 MG per tablet 1 tablet  1 tablet Oral BID PRN Courtney Roy APRN CNP        Or    senna-docusate (SENOKOT-S/PERICOLACE) 8.6-50 MG per tablet 2 tablet  2 tablet Oral BID PRN Courtney Roy APRN CNP        sodium chloride (PF) 0.9% PF flush 3 mL  3 mL Intravenous Q1H PRN Shanthi Wilkins APRN CNP        sodium chloride (PF) 0.9% PF flush 3 mL  3 mL Intracatheter q1 min prn Courtney Roy APRN CNP

## 2024-08-04 NOTE — PLAN OF CARE
Goal Outcome Evaluation: Pt is A&Ox4, denies pain, VSS and on tele SR, on RA and LS diminished, up independently in room, R shin wound dressed and next dressing change 8/5, L groin C/D/I and open to air, Miconazole powder applied per care order for bilateral groin rash. Torsemide 60 mg PO given. Pt is hopeful to discharge home tomorrow.      Plan of Care Reviewed With: patient, significant other    Overall Patient Progress: no changeOverall Patient Progress: no change     Heart Failure Care Map  GOALS TO BE MET BEFORE DISCHARGE:    1. Decrease congestion and/or edema with diuretic therapy to achieve near optimal volume status.     Dyspnea improved: No, further care required to meet this goal. Please explain SOB with ambulation   Edema improved: No, further care required to meet this goal. Please explain still +2 - +3  BLE edema        Last 24 hour I/O:   Intake/Output Summary (Last 24 hours) at 8/3/2024 2221  Last data filed at 8/3/2024 2020  Gross per 24 hour   Intake 1260 ml   Output 2575 ml   Net -1315 ml           Net I/O and Weights since admission:   07/04 2300 - 08/03 2259  In: 60375 [P.O.:25981; I.V.:345]  Out: 43546 [Urine:45141]  Net: -10066     Vitals:    07/25/24 1714 07/26/24 0555 07/27/24 0453 07/28/24 0558   Weight: 140.8 kg (310 lb 6.4 oz) 139.4 kg (307 lb 4.8 oz) 138.8 kg (306 lb) 138.1 kg (304 lb 6.4 oz)    07/29/24 0400 07/30/24 0430 07/31/24 0507 08/01/24 0625   Weight: 136.8 kg (301 lb 9.6 oz) 136.1 kg (300 lb) 135.1 kg (297 lb 12.8 oz) 134.5 kg (296 lb 9.6 oz)    08/02/24 0533 08/03/24 0620   Weight: 137.7 kg (303 lb 9.6 oz) 137.7 kg (303 lb 9.6 oz)       2.  O2 sats > 90% on room air, or at prior home O2 therapy level.      Able to wean O2 this shift to keep sats above 90%?: Yes, satisfactory for discharge.   Does patient use Home O2? No          Current oxygenation status:   SpO2: 93 %     O2 Device: None (Room air),      3.  Tolerates ambulation and mobility near baseline.     Ambulation:  Yes, satisfactory for discharge.   Times patient ambulated with staff this shift: 3    Please review the Heart Failure Care Map for additional HF goal outcomes.    Lily Aguila RN  8/3/2024

## 2024-08-04 NOTE — CONSULTS
[facility]    Cardiology Consultation     Date of Admission:  7/25/2024    Assessment & Plan   Mike Schultz is a 73 year old male who was admitted on 7/25/2024.    Acute on chronic heart failure with preserved ejection fraction  Persistent atrial fibrillation, status post repeat cardioversion this admission on sotalol there is a new medication  Moderate to severe aortic stenosis, last echo May 2024  acute on chronic renal failure this admission, improved, creatinine 1.41  abnormal nuclear stress test with mild apical ischemia nontransparent inferior scar in May 2024, coronary angiography has not been done  Amio toxicity in past    Recommend  continue IV diuresis  resume losartan  repeat echocardiogram tomorrow  at today's visit, reviewed the admission EKGs, previous echocardiogram wages, EP notes and prior right heart catheterization report. Today's medical decision-making was of high complexity.    Trevin Zuleta MD  Primary Care Physician   Ranjan Courtney    Reason for Consult   Reason for consult: I was asked by hospitalist  to evaluate this patient for heart failure.    History of Present Illness   Mike Schultz is a 73 year old male who was admitted from the outpatient infusion clinic for worsening shortness of breath and heart failure. Patient was seen by electrophysiology on July 30.  Patient apparently was 30 pounds above his target weight and had worsening leg edema.    He has a past medical history of persistent atrial fibrillation, heart failure with preserved ejection fraction, moderate aortic stenosis, hypertension and obstructive sleep apnea with previous EtOH abuse.    His atrial fibrillation was first diagnosed in 2012 and failed flexion night. Ablation in 2017. He used amiodarone of an and will stop because of pneumonitis. Yet the third and most recent ablation in May 2024. He is now had recurrence and now has been started EP on sotalol. This was decreased to 80  mg twice daily because of prolonged QTC. He underwent a repeat cardioversion on August 1. He is on Xarelto.    We were consulted because of worsening heart failure and recommendation on heart failure therapy. During hospitalization is creatinine gone up to 2.31 but now has decreased 1.41. His sodium was 139 and potassium 3.2. EKG revealed sinus bradycardia with the PACs and QTC of 484 ms. Patient is presently on IV bumex 2 mg twice daily. His losartan has been held. Spironolactone has been continued.    Admission weight was 310 pounds and now 301 pounds.  Past Medical History   Past Medical History:   Diagnosis Date    (HFpEF) heart failure with preserved ejection fraction (H)     Acute gouty arthritis     Alcohol use disorder in remission     Amiodarone pulmonary toxicity     Aortic stenosis     Basal cell carcinoma     Of the nose    Bell's palsy     Herpes simplex without mention of complication     Hyperlipidemia     Hypertension 2000    Persistent atrial fibrillation (H)     persistent, DCCV 12/2017, ablation 11/6/17    Pulmonary nodules     Sleep apnea     CPAP         Past Surgical History   Past Surgical History:   Procedure Laterality Date    ABLATION OF DYSRHYTHMIC FOCUS  2018, 04/12/2019    Procedure: EP Ablation Focal AFIB;  Surgeon: Fady Day MD;  Location:  HEART CARDIAC CATH LAB    ACHILLES TENDON SURGERY  1997    ANESTHESIA CARDIOVERSION N/A 2/26/2019    Procedure: ANESTHESIA CARDIOVERSION (CONNER);  Surgeon: GENERIC ANESTHESIA PROVIDER;  Location:  OR    ANESTHESIA CARDIOVERSION N/A 11/19/2020    Procedure: ANESTHESIA, FOR CARDIOVERSION (dr campbell);  Surgeon: GENERIC ANESTHESIA PROVIDER;  Location:  OR    ANESTHESIA CARDIOVERSION N/A 8/22/2022    Procedure: CARDIOVERSION;  Surgeon: GENERIC ANESTHESIA PROVIDER;  Location:  OR    ANESTHESIA CARDIOVERSION N/A 9/13/2023    Procedure: Anesthesia cardioversion;  Surgeon: GENERIC ANESTHESIA PROVIDER;  Location:  OR    ANESTHESIA CARDIOVERSION  N/A 10/6/2023    Procedure: Anesthesia cardioversion;  Surgeon: GENERIC ANESTHESIA PROVIDER;  Location:  OR    ANESTHESIA CARDIOVERSION N/A 8/1/2024    Procedure: Anesthesia cardioversion;  Surgeon: GENERIC ANESTHESIA PROVIDER;  Location:  OR    BASAL CELL CARCINOMA EXCISION Right 2009    nose, took cartilage from inner ear.    CARDIOVERSION  2012, 2019    cardioversion for atr fib    COLONOSCOPY  2008    COLONOSCOPY N/A 3/22/2023    Procedure: COLONOSCOPY, FLEXIBLE, WITH LESION REMOVAL USING SNARE;  Surgeon: Anne Lucero MD;  Location:  GI    CV RIGHT HEART CATH MEASUREMENTS RECORDED N/A 7/31/2024    Procedure: Right Heart Cath;  Surgeon: Adriano Lazo MD;  Location:  HEART CARDIAC CATH LAB    EP ABLATION FOCAL AFIB N/A 4/12/2019    Procedure: EP Ablation Focal AFIB;  Surgeon: Fady Day MD;  Location:  HEART CARDIAC CATH LAB    EP ABLATION FOCAL AFIB N/A 5/2/2024    Procedure: Ablation Atrial Fibrilation;  Surgeon: Fady Day MD;  Location:  HEART CARDIAC CATH LAB    HERNIA REPAIR, UMBILICAL  08/20/2012    Procedure: HERNIORRHAPHY UMBILICAL;  UMBILICAL HERNIA REPAIR;  Surgeon: Ra Crocker MD;  Location: [facility]    HERNIORRHAPHY UMBILICAL  8/20/2012    Procedure: HERNIORRHAPHY UMBILICAL;  UMBILICAL HERNIA REPAIR;  Surgeon: Ra Crocker MD;  Location: [facility]    ORTHOPEDIC SURGERY      ORTHOPEDIC SURGERY      OTHER SURGICAL HISTORY Right 03/2017    total KNEE ARTHROSCOPY     TOTAL KNEE ARTHROPLASTY Left 03/16/2018    ZZC NONSPECIFIC PROCEDURE  1997    achilles tendon    ZZC NONSPECIFIC PROCEDURE      knees, arthroscopy    ZZC NONSPECIFIC PROCEDURE      basal cell skin ca, nose    ZZC NONSPECIFIC PROCEDURE      flex sig; colonoscopy due 3/2008    ZZC NONSPECIFIC PROCEDURE  2012    cardioversion for atr fib    ZZC TOTAL KNEE ARTHROPLASTY Left 03/16/2018    Dr. Malick Suarez         Prior to Admission Medications   Prior to Admission Medications   Prescriptions Last Dose  Informant Patient Reported? Taking?   allopurinol (ZYLOPRIM) 300 MG tablet 7/25/2024  No Yes   Sig: Take 1 tablet (300 mg) by mouth daily   cetirizine (ZYRTEC) 10 MG tablet 7/24/2024  Yes Yes   Sig: Take 10 mg by mouth every evening   finasteride (PROPECIA) 1 MG tablet 7/25/2024  No Yes   Sig: Take 1 tablet (1 mg) by mouth daily   losartan (COZAAR) 100 MG tablet 7/25/2024  Yes Yes   Sig: Take 50 mg by mouth daily   metoprolol tartrate (LOPRESSOR) 25 MG tablet 7/25/2024 at am x1  No Yes   Sig: Take 0.5 tablets (12.5 mg) by mouth 2 times daily   potassium chloride pushpa ER (KLOR-CON M20) 20 MEQ CR tablet 7/25/2024 at am x 40 meq  No Yes   Sig: Take 3 tablets (60 mEq) by mouth 2 times daily   predniSONE (DELTASONE) 5 MG tablet 7/25/2024 at 10 mg  No Yes   Sig: Take 8 tablets (40 mg) by mouth daily for 10 days, THEN 4 tablets (20 mg) daily for 30 days, THEN 2 tablets (10 mg) daily for 45 days, THEN 1 tablet (5 mg) daily for 60 days.   psyllium (METAMUCIL/KONSYL) capsule 7/24/2024  Yes Yes   Sig: Take 1-2 capsules by mouth daily   rivaroxaban ANTICOAGULANT (XARELTO ANTICOAGULANT) 20 MG TABS tablet 7/24/2024 at pm  No Yes   Sig: Take 1 tablet (20 mg) by mouth daily (with dinner)   spironolactone (ALDACTONE) 25 MG tablet 7/25/2024 at am  No Yes   Sig: Take 1 tablet (25 mg) by mouth daily   sulfamethoxazole-trimethoprim (BACTRIM DS) 800-160 MG tablet 7/25/2024  No Yes   Sig: Take 1 tablet by mouth daily while on steroids   torsemide (DEMADEX) 20 MG tablet 7/25/2024 at am  No Yes   Sig: Take 4 tablets (80 mg) by mouth daily      Facility-Administered Medications: None     Current Facility-Administered Medications   Medication Dose Route Frequency Provider Last Rate Last Admin    - MEDICATION INSTRUCTIONS -   Does not apply DOES NOT GO TO Courtney Blackmon APRN CNP        acetaminophen (TYLENOL) tablet 650 mg  650 mg Oral Q4H PRN Courtney Roy APRN CNP        Or    acetaminophen (TYLENOL) Suppository 650 mg  650 mg  Rectal Q4H PRN Courtney Roy APRN CNP        allopurinol (ZYLOPRIM) tablet 300 mg  300 mg Oral Daily Courtney Roy APRN CNP   300 mg at 08/04/24 1006    bumetanide (BUMEX) injection 2 mg  2 mg Intravenous BID Narendra Thomas MD        calcium carbonate (TUMS) chewable tablet 1,000 mg  1,000 mg Oral 4x Daily PRN Courtney Roy APRN CNP        cetirizine (zyrTEC) tablet 10 mg  10 mg Oral QPM Courtney Roy APRN CNP   10 mg at 08/03/24 2012    Continuing ACE inhibitor/ARB/ARNI from home medication list OR ACE inhibitor/ARB/ARNI order already placed during this visit   Does not apply DOES NOT GO TO Courtney Blackmon APRN CNP        Continuing beta blocker from home medication list OR beta blocker order already placed during this visit   Does not apply DOES NOT GO TO MAR Courtney Roy APRN CNP        finasteride (PROPECIA) tablet 1 mg  1 mg Oral Daily Courtney Roy APRN CNP   1 mg at 08/04/24 1006    Give 1/2 of usual dose of LONG ACTING insulin AM of procedure IF diabetic   Does not apply Continuous PRN Shanthi Wilkins APRN CNP        HOLD Digoxin (Lanoxin) AM of procedure IF on digoxin   Does not apply HOLD RedShanthi trujillo APRN CNP        HOLD Insulin - RAPID/SHORT acting AM of procedure IF diabetic   Does not apply HOLD RedShanthi trujillo APRN CNP        HOLD Insulin - REGULAR AM of procedure IF diabetic   Does not apply HOLD RedShanthi trujillo APRN CNP        HOLD Oral hypoglycemics AM of procedure IF diabetic   Does not apply HOLD RedShanthi trujillo APRN CNP        lidocaine (LMX4) cream   Topical Q1H PRN Courtney Roy APRN CNP        lidocaine 1 % 0.1-1 mL  0.1-1 mL Other Q1H PRN Courtney Roy APRN CNP        [Held by provider] losartan (COZAAR) tablet 50 mg  50 mg Oral Daily Courtney Roy APRN CNP   50 mg at 08/01/24 1344    magnesium oxide (MAG-OX) tablet 400 mg  400 mg Oral Q4H Narendra Thomas MD   400 mg at 08/04/24 1007    magnesium sulfate 2 g in 50 mL sterile water intermittent infusion   2 g Intravenous Once PRN Shanthi Wilkins APRN CNP        May take oral meds with sip of water, the morning of Cardioversion procedure.   Does not apply Continuous PRN Shanthi Wilkins APRN CNP        melatonin tablet 1 mg  1 mg Oral At Bedtime PRN Courtney Roy APRN CNP        miconazole (MICATIN) 2 % powder   Topical BID Masha Moy MD   Given at 08/03/24 2012    Patient is already receiving anticoagulation with heparin, enoxaparin (LOVENOX), warfarin (COUMADIN)  or other anticoagulant medication   Does not apply Continuous PRN Courtney Roy APRN CNP        polyethylene glycol (MIRALAX) Packet 17 g  17 g Oral Daily Courtney Roy APRN CNP   17 g at 08/04/24 1007    potassium chloride pushpa ER (KLOR-CON M20) CR tablet 20 mEq  20 mEq Oral Once PRN Shanthi Wilkins APRN CNP        potassium chloride pushpa ER (KLOR-CON M20) CR tablet 40 mEq  40 mEq Oral Once PRN Shanthi Wilkins APRN CNP        [Held by provider] potassium chloride pushpa ER (KLOR-CON M20) CR tablet 60 mEq  60 mEq Oral TID Malika Blanco PA-C   60 mEq at 08/01/24 2115    predniSONE (DELTASONE) tablet 10 mg  10 mg Oral Daily Courtney Roy APRN CNP   10 mg at 08/04/24 1006    Followed by    [START ON 8/9/2024] predniSONE (DELTASONE) tablet 5 mg  5 mg Oral Daily Courtney Roy APRN CNP        prochlorperazine (COMPAZINE) injection 5 mg  5 mg Intravenous Q6H PRN Courtney Roy APRN CNP        Or    prochlorperazine (COMPAZINE) tablet 5 mg  5 mg Oral Q6H PRN Courtney Roy APRN CNP        Or    prochlorperazine (COMPAZINE) suppository 12.5 mg  12.5 mg Rectal Q12H PRN Courtney Roy APRN CNP        psyllium (METAMUCIL/KONSYL) capsule 1-2 capsule  1-2 capsule Oral Daily Courtney Roy APRN CNP   2 capsule at 08/04/24 1007    rivaroxaban ANTICOAGULANT (XARELTO) tablet 20 mg  20 mg Oral Daily with supper Manuela, Courtney Staley, AYAN CNP   20 mg at 08/03/24 1627    senna-docusate (SENOKOT-S/PERICOLACE) 8.6-50 MG per tablet 1 tablet  1 tablet  Oral BID PRN Courtney Roy APRN CNP        Or    senna-docusate (SENOKOT-S/PERICOLACE) 8.6-50 MG per tablet 2 tablet  2 tablet Oral BID PRN Courtney Roy APRN CNP        sodium chloride (PF) 0.9% PF flush 3 mL  3 mL Intravenous Q1H PRN Shanthi Wilkins APRN CNP        sodium chloride (PF) 0.9% PF flush 3 mL  3 mL Intracatheter Q8H Courtney Roy APRN CNP   3 mL at 08/04/24 1014    sodium chloride (PF) 0.9% PF flush 3 mL  3 mL Intracatheter q1 min prn Courtney Roy APRN CNP        [Held by provider] sodium chloride 0.9 % infusion   Intravenous Continuous Ajay Almazan MD   Stopped at 08/01/24 1022    sotalol (BETAPACE) tablet 80 mg  80 mg Oral Q12H ONEAL (08/20) Natalie Campos PA-C   80 mg at 08/04/24 1021    spironolactone (ALDACTONE) tablet 25 mg  25 mg Oral Daily Narendra Thomas MD   25 mg at 08/04/24 1021    [Held by provider] torsemide (DEMADEX) tablet 60 mg  60 mg Oral BID Narendra Thomas MD   60 mg at 08/04/24 1005     Current Facility-Administered Medications   Medication Dose Route Frequency Provider Last Rate Last Admin    - MEDICATION INSTRUCTIONS -   Does not apply DOES NOT GO TO Courtney Blackmon APRN CNP        acetaminophen (TYLENOL) tablet 650 mg  650 mg Oral Q4H PRN Courtney Roy APRN CNP        Or    acetaminophen (TYLENOL) Suppository 650 mg  650 mg Rectal Q4H PRN Courtney Roy APRN CNP        allopurinol (ZYLOPRIM) tablet 300 mg  300 mg Oral Daily Courtney Roy APRN CNP   300 mg at 08/04/24 1006    bumetanide (BUMEX) injection 2 mg  2 mg Intravenous BID Narendra Thomas MD        calcium carbonate (TUMS) chewable tablet 1,000 mg  1,000 mg Oral 4x Daily PRN Courtney Roy APRN CNP        cetirizine (zyrTEC) tablet 10 mg  10 mg Oral QPM Courtney Roy APRN CNP   10 mg at 08/03/24 2012    Continuing ACE inhibitor/ARB/ARNI from home medication list OR ACE inhibitor/ARB/ARNI order already placed during this visit   Does not apply DOES NOT GO TO MAR  Courtney Roy APRN CNP        Continuing beta blocker from home medication list OR beta blocker order already placed during this visit   Does not apply DOES NOT GO TO Courtney Blackmon APRN CNP        finasteride (PROPECIA) tablet 1 mg  1 mg Oral Daily Courtney Roy APRN CNP   1 mg at 08/04/24 1006    Give 1/2 of usual dose of LONG ACTING insulin AM of procedure IF diabetic   Does not apply Continuous PRN RedShanthi trujillo APRN CNP        HOLD Digoxin (Lanoxin) AM of procedure IF on digoxin   Does not apply HOLD Shanthi Wilkins APRN CNP        HOLD Insulin - RAPID/SHORT acting AM of procedure IF diabetic   Does not apply HOLD Shanthi Wilkins APRN CNP        HOLD Insulin - REGULAR AM of procedure IF diabetic   Does not apply HOLD Shanthi Wilkins APRN CNP        HOLD Oral hypoglycemics AM of procedure IF diabetic   Does not apply HOLD Shanthi Wilkins APRN CNP        lidocaine (LMX4) cream   Topical Q1H PRN Courtney Roy APRN CNP        lidocaine 1 % 0.1-1 mL  0.1-1 mL Other Q1H PRN Courtney Roy APRN CNP        [Held by provider] losartan (COZAAR) tablet 50 mg  50 mg Oral Daily Courtney Roy APRN CNP   50 mg at 08/01/24 1344    magnesium oxide (MAG-OX) tablet 400 mg  400 mg Oral Q4H Narendra Thomas MD   400 mg at 08/04/24 1007    magnesium sulfate 2 g in 50 mL sterile water intermittent infusion  2 g Intravenous Once PRN Shanthi Wilkins APRN CNP        May take oral meds with sip of water, the morning of Cardioversion procedure.   Does not apply Continuous PRN RedShanthi trujillo APRN CNP        melatonin tablet 1 mg  1 mg Oral At Bedtime PRN Courtney Roy APRN CNP        miconazole (MICATIN) 2 % powder   Topical BID Masha Moy MD   Given at 08/03/24 2012    Patient is already receiving anticoagulation with heparin, enoxaparin (LOVENOX), warfarin (COUMADIN)  or other anticoagulant medication   Does not apply Continuous PRN Courtney Roy APRN CNP        polyethylene glycol (MIRALAX) Packet 17  g  17 g Oral Daily Courtney Roy APRN CNP   17 g at 08/04/24 1007    potassium chloride pushpa ER (KLOR-CON M20) CR tablet 20 mEq  20 mEq Oral Once PRN Shanthi Wilkins APRN CNP        potassium chloride pushpa ER (KLOR-CON M20) CR tablet 40 mEq  40 mEq Oral Once PRN Shanthi Wilkins APRN CNP        [Held by provider] potassium chloride pushpa ER (KLOR-CON M20) CR tablet 60 mEq  60 mEq Oral TID Malika Blanco PA-C   60 mEq at 08/01/24 2115    predniSONE (DELTASONE) tablet 10 mg  10 mg Oral Daily Courtney Roy APRN CNP   10 mg at 08/04/24 1006    Followed by    [START ON 8/9/2024] predniSONE (DELTASONE) tablet 5 mg  5 mg Oral Daily Courtney Roy APRN CNP        prochlorperazine (COMPAZINE) injection 5 mg  5 mg Intravenous Q6H PRN Courtney Roy APRN CNP        Or    prochlorperazine (COMPAZINE) tablet 5 mg  5 mg Oral Q6H PRN Courtney Roy APRN CNP        Or    prochlorperazine (COMPAZINE) suppository 12.5 mg  12.5 mg Rectal Q12H PRN Courtney Roy APRN CNP        psyllium (METAMUCIL/KONSYL) capsule 1-2 capsule  1-2 capsule Oral Daily Courtney Roy APRN CNP   2 capsule at 08/04/24 1007    rivaroxaban ANTICOAGULANT (XARELTO) tablet 20 mg  20 mg Oral Daily with supper Courtney Roy APRN CNP   20 mg at 08/03/24 1627    senna-docusate (SENOKOT-S/PERICOLACE) 8.6-50 MG per tablet 1 tablet  1 tablet Oral BID PRN Courtney Roy APRN CNP        Or    senna-docusate (SENOKOT-S/PERICOLACE) 8.6-50 MG per tablet 2 tablet  2 tablet Oral BID PRN Courtney Roy APRN CNP        sodium chloride (PF) 0.9% PF flush 3 mL  3 mL Intravenous Q1H PRN Shanthi Wilkins APRN CNP        sodium chloride (PF) 0.9% PF flush 3 mL  3 mL Intracatheter Q8H Courtney Roy APRN CNP   3 mL at 08/04/24 1014    sodium chloride (PF) 0.9% PF flush 3 mL  3 mL Intracatheter q1 min prn Courtney Roy APRN CNP        [Held by provider] sodium chloride 0.9 % infusion   Intravenous Continuous Ajay Almazan MD   Stopped at 08/01/24  1022    sotalol (BETAPACE) tablet 80 mg  80 mg Oral Q12H [facility] () Natalie Campos PA-C   80 mg at 24 1021    spironolactone (ALDACTONE) tablet 25 mg  25 mg Oral Daily Narendra Thomas MD   25 mg at 24 1021    [Held by provider] torsemide (DEMADEX) tablet 60 mg  60 mg Oral BID Narendra Thomas MD   60 mg at 24 1005     Allergies   Allergies   Allergen Reactions    Amiodarone Shortness Of Breath     Suspected amiodarone toxicity involving lungs       Social History    reports that he quit smoking about 12 years ago. His smoking use included cigarettes. He started smoking about 47 years ago. He has a 34.6 pack-year smoking history. He has been exposed to tobacco smoke. He has never used smokeless tobacco. He reports that he does not currently use alcohol. He reports that he does not use drugs.      Family History   I have reviewed this patient's family history and updated it with pertinent information if needed.  Family History   Problem Relation Age of Onset    Family History Negative Mother     Heart Disease Father         decesased at 42 - MI    Heart Disease Sister          MI    Lipids Sister     Lipids Sister     No Known Problems Sister     Heart Disease Brother          MI    Heart Disease Brother         CABG AT 49    Lung Cancer No family hx of           Review of Systems   A comprehensive review of system was performed and is negative other than that noted in the HPI or here.     Physical Exam   Vital Signs with Ranges  Temp:  [97.8  F (36.6  C)-98.3  F (36.8  C)] 98  F (36.7  C)  Pulse:  [44-82] 63  Resp:  [16-19] 19  BP: (118-143)/(73-93) 122/76  SpO2:  [92 %-95 %] 95 %  Wt Readings from Last 4 Encounters:   24 136.8 kg (301 lb 9.6 oz)   24 142 kg (313 lb 1.6 oz)   24 140.9 kg (310 lb 10.1 oz)   24 127.5 kg (281 lb)     I/O last 3 completed shifts:  In: 1260 [P.O.:1260]  Out: 2250 [Urine:2250]      Vitals: /76   Pulse 63    "Temp 98  F (36.7  C) (Oral)   Resp 19   Wt 136.8 kg (301 lb 9.6 oz)   SpO2 95%   BMI 45.86 kg/m      Physical Exam:   General - Alert and oriented to time place and person in no acute distress  Eyes - No scleral icterus  HEENT - Neck supple, moist mucous membranes  Cardiovascular - regular S1 S2 is a 3 x 6 ejections systolic murmur and aortic area  Extremities - There is 2+ edema  Respiratory - clear to auscultation  Skin - No pallor or cyanosis  Gastrointestinal - Non tender and non distended without rebound or guarding  Psych - Appropriate affect   Neurological - No gross motor neurological focal deficits    No lab results found in last 7 days.    Invalid input(s): \"TROPONINIES\"    Recent Labs   Lab 08/04/24  0624 08/03/24  0616 08/02/24  0531 08/01/24  0606   WBC  --  10.2 11.4*  --    HGB  --  12.6* 12.9*  --    MCV  --  101* 102*  --    PLT  --  223 222  --    INR  --   --   --  2.47*    137 136 139   POTASSIUM 3.2* 4.8 5.0 3.5   CHLORIDE 97* 100 99 96*   CO2 34* 32* 31* 34*   BUN 35.3* 33.5* 50.5* 42.2*   CR 1.41* 1.41* 2.31* 1.78*   GFRESTIMATED 53* 53* 29* 40*   ANIONGAP 8 5* 6* 9   ASHELY 8.6* 9.2 8.6* 8.8   * 95 128* 113*   ALBUMIN  --  3.6 3.6  --      Recent Labs   Lab Test 07/27/23  0955 10/06/21  1759   CHOL 163 173   HDL 39* 38*   LDL 97 103*   TRIG 133 161*     Recent Labs   Lab 08/03/24  0616 08/02/24  0531   WBC 10.2 11.4*   HGB 12.6* 12.9*   HCT 40.1 39.2*   * 102*    222     Recent Labs   Lab 07/31/24  1222   PHV 7.44*   PO2V 29   PCO2V 54*   HCO3V 37*     No results for input(s): \"NTBNPI\", \"NTBNP\" in the last 168 hours.  No results for input(s): \"DD\" in the last 168 hours.  No results for input(s): \"SED\", \"CRP\" in the last 168 hours.  Recent Labs   Lab 08/03/24  0616 08/02/24  0531    222     No results for input(s): \"TSH\" in the last 168 hours.  No results for input(s): \"COLOR\", \"APPEARANCE\", \"URINEGLC\", \"URINEBILI\", \"URINEKETONE\", \"SG\", \"UBLD\", \"URINEPH\", " "\"PROTEIN\", \"UROBILINOGEN\", \"NITRITE\", \"LEUKEST\", \"RBCU\", \"WBCU\" in the last 168 hours.    Imaging:  No results found for this or any previous visit (from the past 48 hour(s)).    Echo:  No results found for this or any previous visit (from the past 4320 hour(s)).    Clinically Significant Risk Factors        # Hypokalemia: Lowest K = 3.2 mmol/L in last 2 days, will replace as needed   # Hypocalcemia: Lowest Ca = 8.6 mg/dL in last 2 days, will monitor and replace as appropriate         # Hypertension: Noted on problem list  # Chronic heart failure with preserved ejection fraction: heart failure noted on problem list and last echo with EF >50%           # Severe Obesity: Estimated body mass index is 45.86 kg/m  as calculated from the following:    Height as of an earlier encounter on 7/25/24: 1.727 m (5' 8\").    Weight as of this encounter: 136.8 kg (301 lb 9.6 oz).      # Financial/Environmental Concerns: none        Cardiac Arrhythmia: Atrial fibrillation: Persistent  Non-Rheumatic Valve Disease: Aortic valve stenosis      CKD POA List: Stage 3a (GFR 45-59)      Other Pulmonary Conditions: Interstitial lung disease    Pulmonary Heart Disease (Pulmonary hypertension or Cor pulmonale): Pulmonary Hypertension, unspecified                    "

## 2024-08-05 ENCOUNTER — APPOINTMENT (OUTPATIENT)
Dept: CARDIOLOGY | Facility: CLINIC | Age: 74
DRG: 286 | End: 2024-08-05
Attending: INTERNAL MEDICINE
Payer: COMMERCIAL

## 2024-08-05 LAB
ANION GAP SERPL CALCULATED.3IONS-SCNC: 8 MMOL/L (ref 7–15)
ATRIAL RATE - MUSE: 52 BPM
ATRIAL RATE - MUSE: 53 BPM
ATRIAL RATE - MUSE: 57 BPM
BUN SERPL-MCNC: 35.1 MG/DL (ref 8–23)
CALCIUM SERPL-MCNC: 8.9 MG/DL (ref 8.8–10.4)
CHLORIDE SERPL-SCNC: 99 MMOL/L (ref 98–107)
CREAT SERPL-MCNC: 1.37 MG/DL (ref 0.67–1.17)
DIASTOLIC BLOOD PRESSURE - MUSE: NORMAL MMHG
EGFRCR SERPLBLD CKD-EPI 2021: 55 ML/MIN/1.73M2
GLUCOSE SERPL-MCNC: 93 MG/DL (ref 70–99)
HCO3 SERPL-SCNC: 34 MMOL/L (ref 22–29)
HOLD SPECIMEN: NORMAL
INTERPRETATION ECG - MUSE: NORMAL
LVEF ECHO: NORMAL
MAGNESIUM SERPL-MCNC: 2 MG/DL (ref 1.7–2.3)
P AXIS - MUSE: 76 DEGREES
P AXIS - MUSE: 81 DEGREES
P AXIS - MUSE: 85 DEGREES
PHOSPHATE SERPL-MCNC: 3.7 MG/DL (ref 2.5–4.5)
POTASSIUM SERPL-SCNC: 3.7 MMOL/L (ref 3.4–5.3)
PR INTERVAL - MUSE: 204 MS
PR INTERVAL - MUSE: 208 MS
PR INTERVAL - MUSE: 238 MS
QRS DURATION - MUSE: 82 MS
QRS DURATION - MUSE: 86 MS
QRS DURATION - MUSE: 94 MS
QT - MUSE: 512 MS
QT - MUSE: 514 MS
QT - MUSE: 516 MS
QTC - MUSE: 476 MS
QTC - MUSE: 484 MS
QTC - MUSE: 500 MS
R AXIS - MUSE: 37 DEGREES
R AXIS - MUSE: 44 DEGREES
R AXIS - MUSE: 9 DEGREES
SODIUM SERPL-SCNC: 141 MMOL/L (ref 135–145)
SYSTOLIC BLOOD PRESSURE - MUSE: NORMAL MMHG
T AXIS - MUSE: 81 DEGREES
T AXIS - MUSE: 91 DEGREES
T AXIS - MUSE: 96 DEGREES
VENTRICULAR RATE- MUSE: 52 BPM
VENTRICULAR RATE- MUSE: 53 BPM
VENTRICULAR RATE- MUSE: 57 BPM

## 2024-08-05 PROCEDURE — 80048 BASIC METABOLIC PNL TOTAL CA: CPT | Performed by: HOSPITALIST

## 2024-08-05 PROCEDURE — 255N000002 HC RX 255 OP 636: Performed by: STUDENT IN AN ORGANIZED HEALTH CARE EDUCATION/TRAINING PROGRAM

## 2024-08-05 PROCEDURE — 93010 ELECTROCARDIOGRAM REPORT: CPT | Performed by: INTERNAL MEDICINE

## 2024-08-05 PROCEDURE — 250N000013 HC RX MED GY IP 250 OP 250 PS 637: Performed by: INTERNAL MEDICINE

## 2024-08-05 PROCEDURE — 83735 ASSAY OF MAGNESIUM: CPT | Performed by: INTERNAL MEDICINE

## 2024-08-05 PROCEDURE — 250N000013 HC RX MED GY IP 250 OP 250 PS 637: Performed by: PHYSICIAN ASSISTANT

## 2024-08-05 PROCEDURE — 210N000002 HC R&B HEART CARE

## 2024-08-05 PROCEDURE — 99232 SBSQ HOSP IP/OBS MODERATE 35: CPT | Performed by: INTERNAL MEDICINE

## 2024-08-05 PROCEDURE — 250N000011 HC RX IP 250 OP 636: Performed by: INTERNAL MEDICINE

## 2024-08-05 PROCEDURE — 93005 ELECTROCARDIOGRAM TRACING: CPT

## 2024-08-05 PROCEDURE — 93306 TTE W/DOPPLER COMPLETE: CPT | Mod: 26 | Performed by: INTERNAL MEDICINE

## 2024-08-05 PROCEDURE — 84100 ASSAY OF PHOSPHORUS: CPT | Performed by: INTERNAL MEDICINE

## 2024-08-05 PROCEDURE — 250N000012 HC RX MED GY IP 250 OP 636 PS 637: Performed by: NURSE PRACTITIONER

## 2024-08-05 PROCEDURE — 250N000013 HC RX MED GY IP 250 OP 250 PS 637: Performed by: NURSE PRACTITIONER

## 2024-08-05 PROCEDURE — 99233 SBSQ HOSP IP/OBS HIGH 50: CPT | Mod: 25

## 2024-08-05 PROCEDURE — 999N000208 ECHOCARDIOGRAM COMPLETE

## 2024-08-05 PROCEDURE — 99233 SBSQ HOSP IP/OBS HIGH 50: CPT | Performed by: INTERNAL MEDICINE

## 2024-08-05 PROCEDURE — 36415 COLL VENOUS BLD VENIPUNCTURE: CPT | Performed by: HOSPITALIST

## 2024-08-05 PROCEDURE — C8929 TTE W OR WO FOL WCON,DOPPLER: HCPCS

## 2024-08-05 RX ORDER — POTASSIUM CHLORIDE 1500 MG/1
40 TABLET, EXTENDED RELEASE ORAL 2 TIMES DAILY
Status: DISCONTINUED | OUTPATIENT
Start: 2024-08-05 | End: 2024-08-06

## 2024-08-05 RX ADMIN — BUMETANIDE 2 MG: 0.25 INJECTION, SOLUTION INTRAMUSCULAR; INTRAVENOUS at 09:31

## 2024-08-05 RX ADMIN — FINASTERIDE 1 MG: 1 TABLET, FILM COATED ORAL at 09:32

## 2024-08-05 RX ADMIN — CETIRIZINE HYDROCHLORIDE 10 MG: 10 TABLET, FILM COATED ORAL at 20:43

## 2024-08-05 RX ADMIN — HUMAN ALBUMIN MICROSPHERES AND PERFLUTREN 9 ML: 10; .22 INJECTION, SOLUTION INTRAVENOUS at 10:43

## 2024-08-05 RX ADMIN — SOTALOL HYDROCHLORIDE 80 MG: 80 TABLET ORAL at 10:55

## 2024-08-05 RX ADMIN — RIVAROXABAN 20 MG: 20 TABLET, FILM COATED ORAL at 16:33

## 2024-08-05 RX ADMIN — SOTALOL HYDROCHLORIDE 80 MG: 80 TABLET ORAL at 22:38

## 2024-08-05 RX ADMIN — PREDNISONE 10 MG: 10 TABLET ORAL at 09:32

## 2024-08-05 RX ADMIN — LOSARTAN POTASSIUM 50 MG: 50 TABLET, FILM COATED ORAL at 09:32

## 2024-08-05 RX ADMIN — Medication 2 CAPSULE: at 11:28

## 2024-08-05 RX ADMIN — POLYETHYLENE GLYCOL 3350 17 G: 17 POWDER, FOR SOLUTION ORAL at 09:32

## 2024-08-05 RX ADMIN — ZOLPIDEM TARTRATE 5 MG: 5 TABLET, COATED ORAL at 22:38

## 2024-08-05 RX ADMIN — MICONAZOLE NITRATE: 2 POWDER TOPICAL at 09:43

## 2024-08-05 RX ADMIN — BUMETANIDE 2 MG: 0.25 INJECTION, SOLUTION INTRAMUSCULAR; INTRAVENOUS at 16:33

## 2024-08-05 RX ADMIN — SPIRONOLACTONE 25 MG: 25 TABLET ORAL at 09:32

## 2024-08-05 RX ADMIN — ALLOPURINOL 300 MG: 300 TABLET ORAL at 09:32

## 2024-08-05 ASSESSMENT — ACTIVITIES OF DAILY LIVING (ADL)
ADLS_ACUITY_SCORE: 22
ADLS_ACUITY_SCORE: 22
ADLS_ACUITY_SCORE: 26
ADLS_ACUITY_SCORE: 22
ADLS_ACUITY_SCORE: 26
ADLS_ACUITY_SCORE: 22
ADLS_ACUITY_SCORE: 26
ADLS_ACUITY_SCORE: 22
ADLS_ACUITY_SCORE: 26
ADLS_ACUITY_SCORE: 22
ADLS_ACUITY_SCORE: 26
ADLS_ACUITY_SCORE: 22
ADLS_ACUITY_SCORE: 26
ADLS_ACUITY_SCORE: 22

## 2024-08-05 NOTE — PROGRESS NOTES
[facility]    Internal Medicine Hospitalist Progress Note  08/05/2024  I evaluated patient on the above date.    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    Acute on chronic diastolic CHF exacerbation (HFpEF): Improved  Hypertension (benign essential).  AS, moderate.  [PTA: losartan 50 mg daily; metoprolol 12.5 mg BID; spironolactone 25 mg daily; torsemide 80 mg daily.]  * Echo 5/2024 showed LVEF 55%, no obvious regional wall motion abnormalities; RV borderline dilated with normal systolic function; moderate AS, mild AR.  * On admit, noted primary symptoms of significant weight gain, up to 310 pounds, diffuse edema, dyspnea. Felt to be multifactorial etiology including ongoing corticosteroid use as well as PAF.   * Wt 310 lbs on admit (notes baseline wt around 265- 270 lbs). CXR showed mild bibasilar atelectasis/scar, no pleural effusion, normal pulmonary vascularity. Given bumetanide and chlorothiazide IV dose and started on bumetanide gtt on admit. Cardiology consulted on admit.     -He was on bumetanide infusion till 8/1/2024, received Diuril on 7/27, no re-dosing on 7/28 due to bump in creatinine.  Now started on oral torsemide 60 mg twice daily and IV Bumex discontinued on 8/1/2024.  - Continued losartan and metoprolol.  Losartan placed on hold on 7/28 due to worsening kidney function.  - Hold PTA spironolactone and torsemide for few days because of hypotension and worsening renal function.  -Diuresing well.   Right heart cath done on 7/31/2024,.  Shows moderately elevated right heart filling pressures.  Normal left heart filling pressure.  Wedge pressure 15 mmHg.      EP consulted-see below.  - Continue 2 g sodium diet and 2 L fluid restriction.  - Continue to monitor i/o's, daily wts.  - Appreciate Cardiology help.  - AF management as noted.  Patient IV Bumex infusion discontinued on 8/1/2024, unable to take oral torsemide Aldactone giving low blood pressure and worsening kidney function for few days, very slight increase to 303 from 296 pounds.  Kidney functions improved, no more hypotensive, I did resume his torsemide 60 mg twice daily and Aldactone 5 mg daily.  He did receive his torsemide Aldactone on 8/3/2024, which slightly improvement in his weight 231.  His baseline weight or dry weight is around 265 pounds.  Still significantly fluid overloaded.  He was started back on IV Bumex 2 mg twice daily on 8/4/2024.  Responded to the treatment very well, I will continue IV Bumex 2 mg twice daily to his weight is closer to his baseline level.  Then switch to oral torsemide 60 twice daily      Persistent A-fib, felt to be contributing to acute CHF.  Status post DC cardioversion: To normal sinus rhythm  * Follows with Dr. Day. H/o multiple antiarrhythmics including flecainide which he failed and amiodarone resulted in pneumonitis. H/o multiple ablations and cardioversion which have subsequently failed.   * Cardiology consulted on admit.  - Continue to monitor on telemetry.  - Continue PTA metoprolol and rivaroxaban.  -EP consulted on 7/30: Started on sotalol 120 Mg twice daily .  Dose was decreased to 80 Mg twice daily on 7/31 given rising creatinine and prolonged QT on EKG.   He is not successful DC cardioversion to normal sinus rhythm.  With sinus bradycardia initially without any pauses.  Given the prolonged QTc interaction sotalol and Bactrim, EP cardiology has discontinued the Bactrim at this time.  Patient remained  in sinus rhythm at this time.  Remained in normal sinus rhythm, have nocturnal bradycardia and pauses.  Cardiology to follow    Recent pneumonitis, suspect from relapse of inflammatory lung process (h/o pulmonary toxicity from amiodarone versus other inflammatory process), on steroids.  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 Follow-up CT 3/2024 showed bilateral interstitial infiltrates with likely component of fibrosis. Recent hospitalization 5/2024 with issues including relapsed inflammatory lung process, started on high dose steroids with slow taper by Pulmonology.  * Initial presentation as above. CXR showed mild bibasilar atelectasis/scar, no pleural effusion, normal pulmonary vascularity. O2 sats 90's on RA on admit.  - Continue prednisone 10 mg daily through 8/8/2024; then 5 mg daily starting 8/9/2024 for 60 days.  -   - Follow-up with Pulmonology (pending PFTs and repeat CT for 8/2024).  He is on prolonged course of prednisone for pneumonitis as above, dose of 10 mg will soon be switched to less than 5 mg.  Has significant reaction with Bactrim with sotalol both can cause increase in QTc prolongation.  Bactrim is not discontinue at this time by the EP.    Exertional dyspnea, suspect multifactorial including CHF, recent pneumonitis.  - Continue to treat other issues as noted.    BOWEN with variable compliance secondary to frequent nocturia.  - Continue CPAP with 3L O2 while sleeping.    ROBIN on CKD likely stage III.  * Baseline creatinine 1.5.  * Cr 1.56 on admit. Started on diuresis as above.  Went up to 2.09 but improved again at 1.85 on 7/27.  Went up again to 2.03 on 7/28.  Held off on Diuril on 7/28,  Continue Bumex drip.  Creatinine with fluctuation daily, now slowly rising.  Recent Labs   Lab 08/05/24  0715 08/04/24  0624 08/03/24  0616 08/02/24  0531 08/01/24  0606 07/31/24  0644   CR 1.37* 1.41* 1.41* 2.31* 1.78* 1.75*   Estimated Creatinine Clearance: 65.6  mL/min (A) (based on SCr of 1.37 mg/dL (H)).  - Continue to monitor BMP  - Avoid nephrotoxic medications.  Creatinine went up to 2.31, need to hold his losartan, diuretics and Aldactone.  His creatinine now improved to better than his baseline and further improved today to 1.37.  Acute on chronic renal failure likely secondary to hypotensive episode, and being on losartan and diuretics.  Continue Aldactone, resume losartan, start on IV Bumex 2 mg twice daily    Avoid hypotension, he was hypotensive postprocedure on 8/1/2024, most likely the cause of his worsening kidney function.  Now kidney function resolved.  And improved from better than his baseline at this time with IV diuresis.    Hypokalemia and hypomagnesemia.  * Continued on scheduled potassium on admit.  Recent Labs   Lab 08/05/24  0715 08/04/24  2239 08/04/24  1558 08/04/24  0624 08/03/24  0616 08/02/24  0531 08/01/24  0606 07/31/24  0644 07/30/24  1417 07/30/24  0651   POTASSIUM 3.7 3.7 3.3* 3.2* 4.8 5.0 3.5 3.7   < > 3.1*   MAG 2.0  --   --  1.8 2.3 2.5* 1.9 1.9  --  1.9   PHOS 3.7  --   --  4.6* 4.0 3.8 4.2 3.8  --  3.8    < > = values in this interval not displayed.   - Continue scheduled potassium chloride 60 meq BID for now; monitor closely with ROBIN/CKD.  - Continue PRN K and Mg replacement - monitor K levels closely with ROBIN/CKD.    Alcohol use disorder with intermittent spells of sobriety.  * On admit, noted that pt had a few seltzer drinks the past week PTA.  - Cessation encouraged on admit.    Bell's palsy with asymmetric smile.  - Noted.    Lung nodules  * Follows with outpatient Pulmonology.  - Follow-up outpatient.    BPH.  - Continue PTA finasteride.    RLE wound.  * Noted on admit. WOC RN consulted.  - Continue local wound cares per WOC RN.    Severe obesity.  * Body mass index is 45.37 kg/m .  - Needs to continue to pursue aggressive dietary and lifestyle modifications.      Clinically Significant Risk Factors        # Hypokalemia: Lowest  "K = 3.2 mmol/L in last 2 days, will replace as needed             # Hypertension: Noted on problem list  # Acute heart failure with preserved ejection fraction: heart failure noted on problem list, last echo with EF >50%, and receiving IV diuretics           # Severe Obesity: Estimated body mass index is 45.37 kg/m  as calculated from the following:    Height as of an earlier encounter on 7/25/24: 1.727 m (5' 8\").    Weight as of this encounter: 135.4 kg (298 lb 6.4 oz).        # Financial/Environmental Concerns: none         Diet: Fluid restriction 2000 ML FLUID  Advance Diet as Tolerated: Regular Diet Adult; Moderate Consistent Carb (60 g CHO per Meal) Diet; 2 gm NA Diet; Low Saturated Fat Diet    Prophylaxis: PCD's, ambulation, and is on DOAC.  Palmer Catheter: Not present  Lines: None     Code Status: Full Code    Disposition Plan   Medically Ready for Discharge: Anticipated in 2-4 Days, h continue IV diuresis his weight is closer to his baseline, most likely will be related to discharge on Wednesday, 6/7/2024    Expected discharge to home pending stability/improvement/resolution of above acute issues.    Entered: Narendra Thomas MD 08/05/2024, 11:55 AM       Interval History   Patient seen and evaluated in his room today, feeling much better, was ambulating much better yesterday, breathing is better today no fever chills headache dizziness lightheadedness, lower extremity swelling is improving.  His weight is down to 298 pounds    No other significant event overnight      * Data reviewed today: I reviewed all new labs and imaging over the last 24 hours. I personally reviewed labs    Physical Exam   Most recent vitals:   , Blood pressure 129/83, pulse 67, temperature 98.9  F (37.2  C), temperature source Oral, resp. rate 20, weight 135.4 kg (298 lb 6.4 oz), SpO2 93%. O2 Device: None (Room air)    Vitals:    08/03/24 0620 08/04/24 0500 08/05/24 0615   Weight: 137.7 kg (303 lb 9.6 oz) 136.8 kg (301 lb 9.6 oz) 135.4 " kg (298 lb 6.4 oz)     Vital signs with ranges:  Temp:  [98.4  F (36.9  C)-98.9  F (37.2  C)] 98.9  F (37.2  C)  Pulse:  [64-67] 67  Resp:  [18-20] 20  BP: (104-154)/(76-97) 129/83  SpO2:  [93 %-96 %] 93 %  Patient Vitals for the past 24 hrs:   BP Temp Temp src Pulse Resp SpO2 Weight   08/05/24 1127 129/83 98.9  F (37.2  C) Oral -- 20 93 % --   08/05/24 0615 (!) 122/96 -- -- -- 18 95 % 135.4 kg (298 lb 6.4 oz)   08/04/24 2255 120/82 -- -- -- 18 96 % --   08/04/24 1944 104/76 98.4  F (36.9  C) Oral 67 18 94 % --   08/04/24 1552 (!) 140/93 -- -- 64 -- -- --   08/04/24 1246 (!) 154/97 -- -- -- -- -- --   08/04/24 1234 132/83 -- -- -- -- -- --     I/O's last 24 hours:  I/O last 3 completed shifts:  In: 710 [P.O.:710]  Out: 2650 [Urine:2650]    Constitutional: awake, alert, oriented, pleasant, conversant, in no acute distress  Cardiovascular: Regular rhythm, S1-S2 normal.    Lungs: clear to auscultation bilaterally without crackles or wheezes,  Gastrointestinal/Abdomen: soft, non-tender, non-distended, positive bowel sounds  Skin: bilateral lower extremities with 1+ pitting edema  Neurologic: Alert and conversing appropriately, moving all extremities, fluent speech  Psychiatric: Calm and pleasant       Data    Labs reviewed.  Recent Labs   Lab 08/05/24  0715 08/04/24  2239 08/04/24  1558 08/04/24  0624 08/03/24  0616 08/02/24  0531 08/01/24  0606   WBC  --   --   --   --  10.2 11.4*  --    HGB  --   --   --   --  12.6* 12.9*  --    MCV  --   --   --   --  101* 102*  --    PLT  --   --   --   --  223 222  --    INR  --   --   --   --   --   --  2.47*     --   --  139 137 136 139   POTASSIUM 3.7 3.7 3.3* 3.2* 4.8 5.0 3.5   CHLORIDE 99  --   --  97* 100 99 96*   CO2 34*  --   --  34* 32* 31* 34*   BUN 35.1*  --   --  35.3* 33.5* 50.5* 42.2*   CR 1.37*  --   --  1.41* 1.41* 2.31* 1.78*   ANIONGAP 8  --   --  8 5* 6* 9   ASHELY 8.9  --   --  8.6* 9.2 8.6* 8.8   GLC 93  --   --  100* 95 128* 113*   ALBUMIN  --   --   --    --  3.6 3.6  --      Recent Labs   Lab Test 05/05/24  1515 05/05/24  1003 02/11/24  0848 02/11/24  0220 11/28/23  0623   NT-PROBNP, INPATIENT  --  1,202*  --  442 160   TROPONIN T HIGH SENSITIVITY 85* 90* 59* 57*  --      Recent Labs   Lab 08/05/24  0715 08/04/24  0624 08/03/24  0616 08/02/24  0531 08/01/24  0606 07/31/24  0644   GLC 93 100* 95 128* 113* 127*     Recent Labs   Lab Test 02/25/19  0756 10/12/17  0814   A1C 5.7* 5.7       Recent Labs   Lab 08/01/24  0606   INR 2.47*     Recent Labs   Lab 08/03/24  0616 08/02/24  0531 07/31/24  1222   WBC 10.2 11.4*  --    LACT  --   --  0.8       MICRO:  CULTURES (INCLUDING BLOOD AND URINE):  No lab results found in last 7 days.    No results found for this or any previous visit (from the past 24 hour(s)).          Medications   All medications were reviewed. MAR.    Infusions:  Current Facility-Administered Medications   Medication Dose Route Frequency Provider Last Rate Last Admin    - MEDICATION INSTRUCTIONS -   Does not apply DOES NOT GO TO Courtney Blackmon APRN CNP        Continuing ACE inhibitor/ARB/ARNI from home medication list OR ACE inhibitor/ARB/ARNI order already placed during this visit   Does not apply DOES NOT GO TO Courtney Blackmon APRN CNP        Continuing beta blocker from home medication list OR beta blocker order already placed during this visit   Does not apply DOES NOT GO TO Courtney Blackmon APRN CNP        Give 1/2 of usual dose of LONG ACTING insulin AM of procedure IF diabetic   Does not apply Continuous PRN Shanthi Wilkins APRN CNP        May take oral meds with sip of water, the morning of Cardioversion procedure.   Does not apply Continuous PRN Shanthi Wilkins APRN CNP        Patient is already receiving anticoagulation with heparin, enoxaparin (LOVENOX), warfarin (COUMADIN)  or other anticoagulant medication   Does not apply Continuous PRN Courtney Roy APRN CNP        [Held by provider] sodium chloride 0.9 % infusion    Intravenous Continuous Ajay Almazan MD   Stopped at 08/01/24 1022     Scheduled Medications:  Current Facility-Administered Medications   Medication Dose Route Frequency Provider Last Rate Last Admin    allopurinol (ZYLOPRIM) tablet 300 mg  300 mg Oral Daily Courtney Ryo APRN CNP   300 mg at 08/05/24 0932    bumetanide (BUMEX) injection 2 mg  2 mg Intravenous BID Narendra Thomas MD   2 mg at 08/05/24 0931    cetirizine (zyrTEC) tablet 10 mg  10 mg Oral QPM Courtney Roy APRN CNP   10 mg at 08/04/24 2159    finasteride (PROPECIA) tablet 1 mg  1 mg Oral Daily Courtney Roy APRN CNP   1 mg at 08/05/24 0932    losartan (COZAAR) tablet 50 mg  50 mg Oral Daily Trevin Zuleta MD   50 mg at 08/05/24 0932    miconazole (MICATIN) 2 % powder   Topical BID Masha Moy MD   Given at 08/05/24 0943    polyethylene glycol (MIRALAX) Packet 17 g  17 g Oral Daily Courtney Roy APRN CNP   17 g at 08/05/24 0932    [Held by provider] potassium chloride pushpa ER (KLOR-CON M20) CR tablet 40 mEq  40 mEq Oral BID Yasir Pérez MD        predniSONE (DELTASONE) tablet 10 mg  10 mg Oral Daily Courtney Roy APRN CNP   10 mg at 08/05/24 0932    Followed by    [START ON 8/9/2024] predniSONE (DELTASONE) tablet 5 mg  5 mg Oral Daily Courtney Roy APRN CNP        psyllium (METAMUCIL/KONSYL) capsule 1-2 capsule  1-2 capsule Oral Daily Courtney Roy APRN CNP   2 capsule at 08/05/24 1128    rivaroxaban ANTICOAGULANT (XARELTO) tablet 20 mg  20 mg Oral Daily with supper Courtney Roy APRN CNP   20 mg at 08/04/24 1809    sodium chloride (PF) 0.9% PF flush 3 mL  3 mL Intracatheter Q8H Courtney Roy APRN CNP   3 mL at 08/05/24 0942    sotalol (BETAPACE) tablet 80 mg  80 mg Oral Q12H ONEAL (08/20) Natalie Campos PA-C   80 mg at 08/05/24 1055    spironolactone (ALDACTONE) tablet 25 mg  25 mg Oral Daily Narendra Thomas MD   25 mg at 08/05/24 0932    [Held by provider] torsemide  (DEMADEX) tablet 60 mg  60 mg Oral BID Narendra Thomas MD   60 mg at 08/04/24 1005     PRN Medications:  Current Facility-Administered Medications   Medication Dose Route Frequency Provider Last Rate Last Admin    - MEDICATION INSTRUCTIONS -   Does not apply DOES NOT GO TO Courtney Blackmon APRN CNP        acetaminophen (TYLENOL) tablet 650 mg  650 mg Oral Q4H PRN Courtney Roy APRN CNP        Or    acetaminophen (TYLENOL) Suppository 650 mg  650 mg Rectal Q4H PRN Courtney Roy APRN CNP        calcium carbonate (TUMS) chewable tablet 1,000 mg  1,000 mg Oral 4x Daily PRN Courtney Roy APRN CNP        Continuing ACE inhibitor/ARB/ARNI from home medication list OR ACE inhibitor/ARB/ARNI order already placed during this visit   Does not apply DOES NOT GO TO Courtney Blackmon APRN CNP        Continuing beta blocker from home medication list OR beta blocker order already placed during this visit   Does not apply DOES NOT GO TO Courtney Blackmon APRN CNP        Give 1/2 of usual dose of LONG ACTING insulin AM of procedure IF diabetic   Does not apply Continuous PRN Shanthi Wilkins APRN CNP        HOLD Digoxin (Lanoxin) AM of procedure IF on digoxin   Does not apply HOLD Shanthi Wilkins APRN CNP        HOLD Insulin - RAPID/SHORT acting AM of procedure IF diabetic   Does not apply HOLD Shanthi Wilkins APRN CNP        HOLD Insulin - REGULAR AM of procedure IF diabetic   Does not apply HOLD Shanthi Wilkins APRN CNP        HOLD Oral hypoglycemics AM of procedure IF diabetic   Does not apply HOLD Shanthi Wilkins APRN CNP        lidocaine (LMX4) cream   Topical Q1H PRN Courtney Roy APRN CNP        lidocaine 1 % 0.1-1 mL  0.1-1 mL Other Q1H PRN Courtney Roy APRN CNP        magnesium sulfate 2 g in 50 mL sterile water intermittent infusion  2 g Intravenous Once PRN Shanthi Wilkins APRN CNP        May take oral meds with sip of water, the morning of Cardioversion procedure.   Does not apply Continuous PRN  Shanthi Wilkins APRN CNP        melatonin tablet 1 mg  1 mg Oral At Bedtime PRN Courtney Roy APRN CNP        Patient is already receiving anticoagulation with heparin, enoxaparin (LOVENOX), warfarin (COUMADIN)  or other anticoagulant medication   Does not apply Continuous PRN Courtney Roy APRN CNP        potassium chloride pushpa ER (KLOR-CON M20) CR tablet 20 mEq  20 mEq Oral Once PRN Shanthi Wilkins APRN CNP        potassium chloride pushpa ER (KLOR-CON M20) CR tablet 40 mEq  40 mEq Oral Once PRN Shanthi Wilkins APRN CNP        prochlorperazine (COMPAZINE) injection 5 mg  5 mg Intravenous Q6H PRN Courtney Roy APRN CNP        Or    prochlorperazine (COMPAZINE) tablet 5 mg  5 mg Oral Q6H PRN Courtney Roy APRN CNP        Or    prochlorperazine (COMPAZINE) suppository 12.5 mg  12.5 mg Rectal Q12H PRN Courtney Roy APRN CNP        senna-docusate (SENOKOT-S/PERICOLACE) 8.6-50 MG per tablet 1 tablet  1 tablet Oral BID PRN Courtney Roy APRN CNP        Or    senna-docusate (SENOKOT-S/PERICOLACE) 8.6-50 MG per tablet 2 tablet  2 tablet Oral BID PRN Courtney Roy APRN CNP        sodium chloride (PF) 0.9% PF flush 3 mL  3 mL Intravenous Q1H PRN Shanthi Wilkins APRN CNP        sodium chloride (PF) 0.9% PF flush 3 mL  3 mL Intracatheter q1 min prn Courtney Roy APRN CNP        zolpidem (AMBIEN) tablet 5 mg  5 mg Oral At Bedtime PRN Narendra Thomas MD   5 mg at 08/04/24 9874

## 2024-08-05 NOTE — PLAN OF CARE
Goal Outcome Evaluation:  A&Ox4. Independent in room. Tele SR. Denies pain. Edema on ankles +3.   K replaced twice this shift, recheck at 2200.

## 2024-08-05 NOTE — PLAN OF CARE
Goal Outcome Evaluation: Progressing    Reason for Admission: CHF exacerbation    Evaluation of Goal:   Patient is A&Ox 4. Vitals are stable on RA. Tele SB. Patient is up with independent, ambulated x once in the hallway. Sleep Quality moderate. Patient denies pain. Patient scoring green on the Aggression Stoplight Tool. Pt calm and cooperative with cares, able to make needs known, call light within reach, bed alarm on for safety. Discharge disposition is pending.     Ginette Lee RN on 8/5/2024 at 6:24 AM

## 2024-08-05 NOTE — PROGRESS NOTES
" Renal Medicine Progress Note            Assessment/Plan:     73 y.o gentleman grade III DD and severe hypervolemia, consulted for acute kidney injury     # Patient with excellent kidney function in May.      # Acute kidney injury due to CRS: Improved .    # Decompensated heart failure:  baseline of ~ 270 lbs.  Edema is better    # Moderate aortic stenosis:     # Morbid obesity with BMI of 46.      Plan:  # Cont spironolactone  # Cont IV Bumex. Can switch to oral 1-2 days prior to discharge.     I reviewed notes from Dr. Thomas and Dr. Zuleta.        Interval History:     \"Every is better,\" he says.  He says his breathing is better.  Diuresing well.   Net neg 2 liters since last 24 hrs.  On IV Bumex.   Kidney function improved.          Medications and Allergies:     Current Facility-Administered Medications   Medication Dose Route Frequency Provider Last Rate Last Admin    allopurinol (ZYLOPRIM) tablet 300 mg  300 mg Oral Daily Courtney Roy APRN CNP   300 mg at 08/05/24 0932    bumetanide (BUMEX) injection 2 mg  2 mg Intravenous BID Narendra Thomas MD   2 mg at 08/05/24 0931    cetirizine (zyrTEC) tablet 10 mg  10 mg Oral QPM Courtney Roy APRN CNP   10 mg at 08/04/24 2159    finasteride (PROPECIA) tablet 1 mg  1 mg Oral Daily Courtney Roy APRN CNP   1 mg at 08/05/24 0932    losartan (COZAAR) tablet 50 mg  50 mg Oral Daily Trevin Zuleta MD   50 mg at 08/05/24 0932    miconazole (MICATIN) 2 % powder   Topical BID Masha Moy MD   Given at 08/05/24 0943    polyethylene glycol (MIRALAX) Packet 17 g  17 g Oral Daily Courtney Roy APRN CNP   17 g at 08/05/24 0932    [Held by provider] potassium chloride pushpa ER (KLOR-CON M20) CR tablet 60 mEq  60 mEq Oral TID Malika Blanco PA-C   60 mEq at 08/01/24 2115    predniSONE (DELTASONE) tablet 10 mg  10 mg Oral Daily Courtney Roy APRN CNP   10 mg at 08/05/24 0932    Followed by    [START ON 8/9/2024] predniSONE (DELTASONE) tablet 5 mg "  5 mg Oral Daily Courtney Roy APRN CNP        psyllium (METAMUCIL/KONSYL) capsule 1-2 capsule  1-2 capsule Oral Daily SreedharCourtney xiong APRN CNP   2 capsule at 08/04/24 1007    rivaroxaban ANTICOAGULANT (XARELTO) tablet 20 mg  20 mg Oral Daily with supper SreedharCourtney xiong APRN CNP   20 mg at 08/04/24 1809    sodium chloride (PF) 0.9% PF flush 3 mL  3 mL Intracatheter Q8H SreedharCourtney xiong APRN CNP   3 mL at 08/05/24 0942    sotalol (BETAPACE) tablet 80 mg  80 mg Oral Q12H NOEAL (08/20) Natalie Campos PA-C   80 mg at 08/04/24 2257    spironolactone (ALDACTONE) tablet 25 mg  25 mg Oral Daily Narendra Thomas MD   25 mg at 08/05/24 0932    [Held by provider] torsemide (DEMADEX) tablet 60 mg  60 mg Oral BID Narendra Thomas MD   60 mg at 08/04/24 1005        Allergies   Allergen Reactions    Amiodarone Shortness Of Breath     Suspected amiodarone toxicity involving lungs            Physical Exam:   Vitals were reviewed   , Blood pressure (!) 122/96, pulse 67, temperature 98.4  F (36.9  C), temperature source Oral, resp. rate 18, weight 135.4 kg (298 lb 6.4 oz), SpO2 95%.    Wt Readings from Last 3 Encounters:   08/05/24 135.4 kg (298 lb 6.4 oz)   07/25/24 142 kg (313 lb 1.6 oz)   07/24/24 140.9 kg (310 lb 10.1 oz)       Intake/Output Summary (Last 24 hours) at 8/5/2024 1046  Last data filed at 8/5/2024 1043  Gross per 24 hour   Intake 1070 ml   Output 2870 ml   Net -1800 ml       GENERAL APPEARANCE: pleasant, NAD, alert  HEENT:  Eyes/ears/nose/neck grossly normal  RESP: lungs cta b c good efforts, no crackles, rhonchi or wheezes  CV: RRR, + tight murmur  ABDOMEN: obese, soft, NT  EXTREMITIES/SKIN: no rashes/lesions on observed skin; 1-2+ edema  NEURO: Awake, alert and conversing normally.         Data:     CBC RESULTS:     Recent Labs   Lab 08/03/24  0616 08/02/24  0531   WBC 10.2 11.4*   RBC 3.97* 3.84*   HGB 12.6* 12.9*   HCT 40.1 39.2*    222       Basic Metabolic Panel:  Recent Labs   Lab  08/05/24  0715 08/04/24  2239 08/04/24  1558 08/04/24  0624 08/03/24  0616 08/02/24  0531 08/01/24  0606 07/31/24  0644     --   --  139 137 136 139 136   POTASSIUM 3.7 3.7 3.3* 3.2* 4.8 5.0 3.5 3.7   CHLORIDE 99  --   --  97* 100 99 96* 93*   CO2 34*  --   --  34* 32* 31* 34* 32*   BUN 35.1*  --   --  35.3* 33.5* 50.5* 42.2* 38.4*   CR 1.37*  --   --  1.41* 1.41* 2.31* 1.78* 1.75*   GLC 93  --   --  100* 95 128* 113* 127*   ASHELY 8.9  --   --  8.6* 9.2 8.6* 8.8 9.3       INR  Recent Labs   Lab 08/01/24  0606   INR 2.47*      Attestation:   I have reviewed today's relevant vital signs, notes, medications, labs and imaging.    Parveen Jj MD  [facility] - Nephrology  Office phone :[phone]  Pager: [phone]

## 2024-08-05 NOTE — PLAN OF CARE
Care plan note:      Recent Vitals:  Temp: 98.3  F (36.8  C) Temp src: Oral BP: 108/69 Pulse: 62   Resp: 18 SpO2: 93 % O2 Device: None (Room air)      Orientation/Neuro: Alert and Oriented x 4  Pain: The patient is not having any pain.   Tele: Sinus rhythm    IV medications: None   Mobility: up Independently   Skin: Wounds: Right leg.   Resp: RA.  GI: WDL  : Strict I&O     Diet: Tolerating diet:   Well  Orders Placed This Encounter      Advance Diet as Tolerated: Regular Diet Adult; Moderate Consistent Carb (60 g CHO per Meal) Diet; 2 gm NA Diet; Low Saturated Fat Diet      Safety/Concerns:  None  Aggression Color: Green    Plan: Pt continues to diurese, output has decreased some this afternoon.    Continue to monitor.      Wellington Mora RN

## 2024-08-06 ENCOUNTER — APPOINTMENT (OUTPATIENT)
Dept: OCCUPATIONAL THERAPY | Facility: CLINIC | Age: 74
DRG: 286 | End: 2024-08-06
Attending: STUDENT IN AN ORGANIZED HEALTH CARE EDUCATION/TRAINING PROGRAM
Payer: COMMERCIAL

## 2024-08-06 LAB
ANION GAP SERPL CALCULATED.3IONS-SCNC: 6 MMOL/L (ref 7–15)
ATRIAL RATE - MUSE: 56 BPM
BUN SERPL-MCNC: 29.7 MG/DL (ref 8–23)
CALCIUM SERPL-MCNC: 9.2 MG/DL (ref 8.8–10.4)
CHLORIDE SERPL-SCNC: 99 MMOL/L (ref 98–107)
CREAT SERPL-MCNC: 1.18 MG/DL (ref 0.67–1.17)
DIASTOLIC BLOOD PRESSURE - MUSE: NORMAL MMHG
EGFRCR SERPLBLD CKD-EPI 2021: 66 ML/MIN/1.73M2
GLUCOSE SERPL-MCNC: 92 MG/DL (ref 70–99)
HCO3 SERPL-SCNC: 35 MMOL/L (ref 22–29)
INTERPRETATION ECG - MUSE: NORMAL
P AXIS - MUSE: 70 DEGREES
POTASSIUM SERPL-SCNC: 3.6 MMOL/L (ref 3.4–5.3)
PR INTERVAL - MUSE: 214 MS
QRS DURATION - MUSE: 84 MS
QT - MUSE: 548 MS
QTC - MUSE: 528 MS
R AXIS - MUSE: 32 DEGREES
SODIUM SERPL-SCNC: 140 MMOL/L (ref 135–145)
SYSTOLIC BLOOD PRESSURE - MUSE: NORMAL MMHG
T AXIS - MUSE: 93 DEGREES
VENTRICULAR RATE- MUSE: 56 BPM

## 2024-08-06 PROCEDURE — 80048 BASIC METABOLIC PNL TOTAL CA: CPT | Performed by: HOSPITALIST

## 2024-08-06 PROCEDURE — 93005 ELECTROCARDIOGRAM TRACING: CPT

## 2024-08-06 PROCEDURE — 99233 SBSQ HOSP IP/OBS HIGH 50: CPT | Mod: FS

## 2024-08-06 PROCEDURE — 250N000013 HC RX MED GY IP 250 OP 250 PS 637: Performed by: INTERNAL MEDICINE

## 2024-08-06 PROCEDURE — 250N000009 HC RX 250

## 2024-08-06 PROCEDURE — 250N000011 HC RX IP 250 OP 636: Performed by: INTERNAL MEDICINE

## 2024-08-06 PROCEDURE — 99232 SBSQ HOSP IP/OBS MODERATE 35: CPT | Performed by: STUDENT IN AN ORGANIZED HEALTH CARE EDUCATION/TRAINING PROGRAM

## 2024-08-06 PROCEDURE — 250N000012 HC RX MED GY IP 250 OP 636 PS 637: Performed by: NURSE PRACTITIONER

## 2024-08-06 PROCEDURE — 36415 COLL VENOUS BLD VENIPUNCTURE: CPT | Performed by: HOSPITALIST

## 2024-08-06 PROCEDURE — 97110 THERAPEUTIC EXERCISES: CPT | Mod: GO | Performed by: OCCUPATIONAL THERAPIST

## 2024-08-06 PROCEDURE — 210N000002 HC R&B HEART CARE

## 2024-08-06 PROCEDURE — 250N000013 HC RX MED GY IP 250 OP 250 PS 637: Performed by: NURSE PRACTITIONER

## 2024-08-06 PROCEDURE — 93010 ELECTROCARDIOGRAM REPORT: CPT | Performed by: INTERNAL MEDICINE

## 2024-08-06 PROCEDURE — 250N000013 HC RX MED GY IP 250 OP 250 PS 637: Performed by: PHYSICIAN ASSISTANT

## 2024-08-06 PROCEDURE — 99232 SBSQ HOSP IP/OBS MODERATE 35: CPT | Performed by: INTERNAL MEDICINE

## 2024-08-06 RX ORDER — BUMETANIDE 0.25 MG/ML
2 INJECTION INTRAMUSCULAR; INTRAVENOUS ONCE
Status: COMPLETED | OUTPATIENT
Start: 2024-08-06 | End: 2024-08-06

## 2024-08-06 RX ORDER — POTASSIUM CHLORIDE 1500 MG/1
40 TABLET, EXTENDED RELEASE ORAL 3 TIMES DAILY
Status: DISCONTINUED | OUTPATIENT
Start: 2024-08-06 | End: 2024-08-08

## 2024-08-06 RX ADMIN — POTASSIUM CHLORIDE 40 MEQ: 1500 TABLET, EXTENDED RELEASE ORAL at 17:36

## 2024-08-06 RX ADMIN — SOTALOL HYDROCHLORIDE 80 MG: 80 TABLET ORAL at 11:41

## 2024-08-06 RX ADMIN — RIVAROXABAN 20 MG: 20 TABLET, FILM COATED ORAL at 17:36

## 2024-08-06 RX ADMIN — ALLOPURINOL 300 MG: 300 TABLET ORAL at 09:36

## 2024-08-06 RX ADMIN — POTASSIUM CHLORIDE 40 MEQ: 1500 TABLET, EXTENDED RELEASE ORAL at 21:09

## 2024-08-06 RX ADMIN — SPIRONOLACTONE 25 MG: 25 TABLET ORAL at 09:36

## 2024-08-06 RX ADMIN — SOTALOL HYDROCHLORIDE 80 MG: 80 TABLET ORAL at 23:25

## 2024-08-06 RX ADMIN — Medication 1 MG: at 23:25

## 2024-08-06 RX ADMIN — PREDNISONE 10 MG: 10 TABLET ORAL at 09:36

## 2024-08-06 RX ADMIN — POTASSIUM CHLORIDE 40 MEQ: 1500 TABLET, EXTENDED RELEASE ORAL at 12:14

## 2024-08-06 RX ADMIN — MICONAZOLE NITRATE: 2 POWDER TOPICAL at 09:39

## 2024-08-06 RX ADMIN — POLYETHYLENE GLYCOL 3350 17 G: 17 POWDER, FOR SOLUTION ORAL at 09:36

## 2024-08-06 RX ADMIN — FINASTERIDE 1 MG: 1 TABLET, FILM COATED ORAL at 09:36

## 2024-08-06 RX ADMIN — LOSARTAN POTASSIUM 50 MG: 50 TABLET, FILM COATED ORAL at 09:36

## 2024-08-06 RX ADMIN — Medication 2 CAPSULE: at 09:36

## 2024-08-06 RX ADMIN — BUMETANIDE 0.25 MG/HR: 0.25 INJECTION INTRAMUSCULAR; INTRAVENOUS at 09:34

## 2024-08-06 RX ADMIN — BUMETANIDE 2 MG: 0.25 INJECTION INTRAMUSCULAR; INTRAVENOUS at 11:40

## 2024-08-06 RX ADMIN — CETIRIZINE HYDROCHLORIDE 10 MG: 10 TABLET, FILM COATED ORAL at 21:09

## 2024-08-06 ASSESSMENT — ACTIVITIES OF DAILY LIVING (ADL)
ADLS_ACUITY_SCORE: 22

## 2024-08-06 NOTE — PROGRESS NOTES
Renal Medicine Progress Note            Assessment/Plan:     73 y.o gentleman grade III DD and severe hypervolemia, consulted for acute kidney injury     # Patient with excellent kidney function in May.      # Acute kidney injury due to CRS: Improved .     # Decompensated heart failure:  baseline of ~ 270 lbs.  Edema is better     # Moderate to severe aortic stenosis. EF 55-60%. Moderate cLVH.      # Morbid obesity with BMI of 46.      Plan:  # Cont spironolactone  # Bolus Bumex 2 mg. Bumex gtt is at 0.25 mcg/hr  # Goal is 2-3 liters net neg daily     I reviewed notes from notes from IM and cardiology.           Interval History:     She feels well.  No no new concerns from him.   I/O: 1500/2100.   He is on Bumex gtt at 00.25 mg/hr  Kidney function continues to improve.  He still has a lot of fluid to mobilize.          Medications and Allergies:     Current Facility-Administered Medications   Medication Dose Route Frequency Provider Last Rate Last Admin    allopurinol (ZYLOPRIM) tablet 300 mg  300 mg Oral Daily Courtney Roy APRN CNP   300 mg at 08/06/24 0936    cetirizine (zyrTEC) tablet 10 mg  10 mg Oral QPM Courtney Roy APRN CNP   10 mg at 08/05/24 2043    finasteride (PROPECIA) tablet 1 mg  1 mg Oral Daily Courtney Roy APRN CNP   1 mg at 08/06/24 0936    losartan (COZAAR) tablet 50 mg  50 mg Oral Daily Trevin Zuleta MD   50 mg at 08/06/24 0936    miconazole (MICATIN) 2 % powder   Topical BID Masha Moy MD   Given at 08/06/24 0939    polyethylene glycol (MIRALAX) Packet 17 g  17 g Oral Daily Courtney Roy APRN CNP   17 g at 08/06/24 0936    [Held by provider] potassium chloride pushpa ER (KLOR-CON M20) CR tablet 40 mEq  40 mEq Oral BID Yasir Pérez MD        predniSONE (DELTASONE) tablet 10 mg  10 mg Oral Daily Courtney Roy APRN CNP   10 mg at 08/06/24 0936    Followed by    [START ON 8/9/2024] predniSONE (DELTASONE) tablet 5 mg  5 mg Oral Daily Courtney Roy, APRN  CNP        psyllium (METAMUCIL/KONSYL) capsule 1-2 capsule  1-2 capsule Oral Daily Courtney Roy APRN CNP   2 capsule at 08/06/24 0936    rivaroxaban ANTICOAGULANT (XARELTO) tablet 20 mg  20 mg Oral Daily with supper Courtney Roy APRN CNP   20 mg at 08/05/24 1633    sodium chloride (PF) 0.9% PF flush 3 mL  3 mL Intracatheter Q8H Courtney Roy APRN CNP   3 mL at 08/05/24 2239    sotalol (BETAPACE) tablet 80 mg  80 mg Oral Q12H ONEAL (08/20) Natalie Campos PA-C   80 mg at 08/05/24 2238    spironolactone (ALDACTONE) tablet 25 mg  25 mg Oral Daily Narendra Thomas MD   25 mg at 08/06/24 0936        Allergies   Allergen Reactions    Amiodarone Shortness Of Breath     Suspected amiodarone toxicity involving lungs            Physical Exam:   Vitals were reviewed   , Blood pressure 120/78, pulse 58, temperature 97.7  F (36.5  C), temperature source Oral, resp. rate 18, weight 135.3 kg (298 lb 3.2 oz), SpO2 94%.    Wt Readings from Last 3 Encounters:   08/06/24 135.3 kg (298 lb 3.2 oz)   07/25/24 142 kg (313 lb 1.6 oz)   07/24/24 140.9 kg (310 lb 10.1 oz)       Intake/Output Summary (Last 24 hours) at 8/6/2024 1042  Last data filed at 8/6/2024 0940  Gross per 24 hour   Intake 1620 ml   Output 2150 ml   Net -530 ml       GENERAL APPEARANCE: pleasant, NAD, alert  HEENT:  Eyes/ears/nose/neck grossly normal  RESP: lungs cta b c good efforts, no crackles, rhonchi or wheezes  CV: RRR, + tight murmur  ABDOMEN: obese, soft, NT  EXTREMITIES/SKIN: no rashes/lesions on observed skin; 2+ edema  NEURO: Awake, alert and conversing normally.         Data:     CBC RESULTS:     Recent Labs   Lab 08/03/24  0616 08/02/24  0531   WBC 10.2 11.4*   RBC 3.97* 3.84*   HGB 12.6* 12.9*   HCT 40.1 39.2*    222       Basic Metabolic Panel:  Recent Labs   Lab 08/06/24  0628 08/05/24  0715 08/04/24  2239 08/04/24  1558 08/04/24  0624 08/03/24  0616 08/02/24  0531 08/01/24  0606    141  --   --  139 137 136 139    POTASSIUM 3.6 3.7 3.7 3.3* 3.2* 4.8 5.0 3.5   CHLORIDE 99 99  --   --  97* 100 99 96*   CO2 35* 34*  --   --  34* 32* 31* 34*   BUN 29.7* 35.1*  --   --  35.3* 33.5* 50.5* 42.2*   CR 1.18* 1.37*  --   --  1.41* 1.41* 2.31* 1.78*   GLC 92 93  --   --  100* 95 128* 113*   ASHELY 9.2 8.9  --   --  8.6* 9.2 8.6* 8.8       INR  Recent Labs   Lab 08/01/24  0606   INR 2.47*      Attestation:   I have reviewed today's relevant vital signs, notes, medications, labs and imaging.    Parveen Jj MD  [facility] - Nephrology  Office phone :[phone]  Pager: [phone]

## 2024-08-06 NOTE — PLAN OF CARE
Summary: CHF exacerbation. R-Heart cath on 7/31. Cardioversion on 8/1  Orientation: A&Ox4   Vitals/Tele: VSS on RA, SB with BBB  IV Access/drains: PIV-SL  Pain: Denies  Diet: Cardiac/Mod Carb; FR 2000cc  Mobility: Ind in room, ambulated in hallway 1x.  GI/: Urinal at bedside; continent   Wound/Skin: Skin tear on RLE-dressing CDI. L-groin site WDL. BLE edema +2-Compression stockings removed overnight.    Consults: Nephrology, Cards, WOC, EP  Other: Pt refuses CPAP overnight. K protocol-AM draw. Gave Ambien overnight.  Discharge Plan: Plan to go from IV lasix to PO, will start later today. Pending DC

## 2024-08-06 NOTE — PLAN OF CARE
Care plan note:      Recent Vitals:  Temp: 98  F (36.7  C) Temp src: Oral BP: 134/74 Pulse: 77   Resp: 18 SpO2: 91 % O2 Device: None (Room air)      Orientation/Neuro: Alert and Oriented x 4  Pain: The patient is not having any pain.   Tele: Sinus rhythm    IV medications: Bumex    Mobility: up Independently   Skin: Wounds: Right leg.   Resp: RA.  GI: WDL  : Strict I&O     Diet: Tolerating diet:   Well  Orders Placed This Encounter      Advance Diet as Tolerated: Regular Diet Adult; Moderate Consistent Carb (60 g CHO per Meal) Diet; 2 gm NA Diet; Low Saturated Fat Diet      Safety/Concerns:  None  Aggression Color: Green    Plan: Pt having good response from Bumex infusion.    Continue to monitor.      Wellington Mora RN

## 2024-08-06 NOTE — PROGRESS NOTES
[facility]    Internal Medicine Hospitalist Progress Note  08/06/2024  I evaluated patient on the above date.    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    Acute on chronic diastolic CHF exacerbation (HFpEF): Improved  Hypertension (benign essential).  AS, moderate.  [PTA: losartan 50 mg daily; metoprolol 12.5 mg BID; spironolactone 25 mg daily; torsemide 80 mg daily.]  * Echo 5/2024 showed LVEF 55%, no obvious regional wall motion abnormalities; RV borderline dilated with normal systolic function; moderate AS, mild AR.  * On admit, noted primary symptoms of significant weight gain, up to 310 pounds, diffuse edema, dyspnea. Felt to be multifactorial etiology including ongoing corticosteroid use as well as PAF.   * Wt 310 lbs on admit (notes baseline wt around 265- 270 lbs). CXR showed mild bibasilar atelectasis/scar, no pleural effusion, normal pulmonary vascularity. Given bumetanide and chlorothiazide IV dose and started on bumetanide gtt on admit. Cardiology consulted on admit.     -He was on bumetanide infusion till 8/1/2024, received Diuril on 7/27, no re-dosing on 7/28 due to bump in creatinine.  Now started on oral torsemide 60 mg twice daily and IV Bumex discontinued on 8/1/2024.  - Continued losartan and metoprolol.  Losartan placed on hold on 7/28 due to worsening kidney function.  - Hold PTA spironolactone and torsemide for few days because of hypotension and worsening renal function.  -Diuresing  well.  Right heart cath done on 7/31/2024,.  Shows moderately elevated right heart filling pressures.  Normal left heart filling pressure.  Wedge pressure 15 mmHg.  Plan:    EP consulted-see below.  - Continue 2 g sodium diet and 2 L fluid restriction.  - Continue to monitor i/o's, daily wts.  - Appreciate Cardiology help.  - AF management as noted.  Patient IV Bumex infusion discontinued on 8/1/2024, unable to take oral torsemide Aldactone giving low blood pressure and worsening kidney function for few days, very slight increase to 303 from 296 pounds.  Kidney functions improved, no more hypotensive, I did resume his torsemide 60 mg twice daily and Aldactone 5 mg daily.  He did receive his torsemide Aldactone on 8/3/2024, which slightly improvement in his weight 231.  His baseline weight or dry weight is around 265 pounds.  Still significantly fluid overloaded.  He was started back on IV Bumex 2 mg twice daily on 8/4/2024.  Responded to the treatment very well   Cont spironolactone   Continue Bumex gtt is at 0.25 mcg/hr   Goal is 2-3 liters net neg daily   Daily BMPs    Persistent A-fib, felt to be contributing to acute CHF.  Status post DC cardioversion: To normal sinus rhythm  * Follows with Dr. Day. H/o multiple antiarrhythmics including flecainide which he failed and amiodarone resulted in pneumonitis. H/o multiple ablations and cardioversion which have subsequently failed.   * Cardiology consulted on admit.  - Continue to monitor on telemetry.  - Continue PTA metoprolol and rivaroxaban.  -EP consulted on 7/30: Started on sotalol 120 Mg twice daily .  Dose was decreased to 80 Mg twice daily on 7/31 given rising creatinine and prolonged QT on EKG.   He is not successful DC cardioversion to normal sinus rhythm.  With sinus bradycardia initially without any pauses.  Given the prolonged QTc interaction sotalol and Bactrim, EP cardiology has discontinued the Bactrim at this time.  Patient remained in sinus rhythm  at this time.  Remained in normal sinus rhythm, have nocturnal bradycardia and pauses.  Cardiology to follow    Recent pneumonitis, suspect from relapse of inflammatory lung process (h/o pulmonary toxicity from amiodarone versus other inflammatory process), on steroids.  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 Follow-up CT 3/2024 showed bilateral interstitial infiltrates with likely component of fibrosis. Recent hospitalization 5/2024 with issues including relapsed inflammatory lung process, started on high dose steroids with slow taper by Pulmonology.  * Initial presentation as above. CXR showed mild bibasilar atelectasis/scar, no pleural effusion, normal pulmonary vascularity. O2 sats 90's on RA on admit.  - Continue prednisone 10 mg daily through 8/8/2024; then 5 mg daily starting 8/9/2024 for 60 days.  -   - Follow-up with Pulmonology (pending PFTs and repeat CT for 8/2024).  He is on prolonged course of prednisone for pneumonitis as above, dose of 10 mg will soon be switched to less than 5 mg.   - Has significant reaction with Bactrim with sotalol both can cause increase in QTc prolongation.    - Bactrim is not discontinue at this time by EP.    Exertional dyspnea, suspect multifactorial including CHF, recent pneumonitis.  - Continue to treat other issues as noted.    BOWEN with variable compliance secondary to frequent nocturia.  - Continue CPAP with 3L O2 while sleeping.    ROBIN on CKD likely stage III.  * Baseline creatinine 1.5.  * Cr 1.56 on admit. Started on diuresis as above.  Went up to 2.09 but improved again at 1.85 on 7/27.  Went up again to 2.03 on 7/28.  Held off on Diuril on 7/28,  Continue Bumex drip.  Creatinine with fluctuation daily, now slowly rising.  Recent Labs   Lab 08/06/24  0628 08/05/24  0715 08/04/24  0624 08/03/24  0616 08/02/24  0531 08/01/24  0606   CR 1.18* 1.37* 1.41* 1.41* 2.31* 1.78*   Estimated Creatinine Clearance: 76.2 mL/min (A) (based  on SCr of 1.18 mg/dL (H)).  - Continue to monitor BMP  - Avoid nephrotoxic medications.  Creatinine went up to 2.31, need to hold his losartan, diuretics and Aldactone.  His creatinine now improved to better than his baseline and further improved today to 1.37.  Acute on chronic renal failure likely secondary to hypotensive episode, and being on losartan and diuretics.  Continue Aldactone, resume losartan,   Continue on IV Bumex  Avoid hypotension, he was hypotensive postprocedure on 8/1/2024, most likely the cause of his worsening kidney function.  Now kidney function resolved.    Hypokalemia and hypomagnesemia.  * Continued on scheduled potassium on admit.  Recent Labs   Lab 08/06/24  0628 08/05/24  0715 08/04/24  2239 08/04/24  1558 08/04/24  0624 08/03/24  0616 08/02/24  0531 08/01/24  0606 07/31/24  0644   POTASSIUM 3.6 3.7 3.7 3.3* 3.2* 4.8 5.0 3.5 3.7   MAG  --  2.0  --   --  1.8 2.3 2.5* 1.9 1.9   PHOS  --  3.7  --   --  4.6* 4.0 3.8 4.2 3.8   - Continue scheduled potassium chloride 60 meq BID for now; monitor closely with ROBIN/CKD.  - Continue PRN K and Mg replacement - monitor K levels closely with ROBIN/CKD.    Alcohol use disorder with intermittent spells of sobriety.  * On admit, noted that pt had a few seltzer drinks the past week PTA.  - Cessation encouraged on admit.    Bell's palsy with asymmetric smile.  - Noted.    Lung nodules  * Follows with outpatient Pulmonology.  - Follow-up outpatient.    BPH.  - Continue PTA finasteride.    RLE wound.  * Noted on admit. WOC RN consulted.  - Continue local wound cares per WOC RN.    Severe obesity.  * Body mass index is 45.34 kg/m .  - Needs to continue to pursue aggressive dietary and lifestyle modifications.      Clinically Significant Risk Factors        # Hypokalemia: Lowest K = 3.3 mmol/L in last 2 days, will replace as needed             # Hypertension: Noted on problem list  # Acute heart failure with preserved ejection fraction: heart failure noted on  "problem list, last echo with EF >50%, and receiving IV diuretics           # Severe Obesity: Estimated body mass index is 45.34 kg/m  as calculated from the following:    Height as of an earlier encounter on 7/25/24: 1.727 m (5' 8\").    Weight as of this encounter: 135.3 kg (298 lb 3.2 oz).        # Financial/Environmental Concerns: none         Diet: Fluid restriction 2000 ML FLUID  Advance Diet as Tolerated: Regular Diet Adult; Moderate Consistent Carb (60 g CHO per Meal) Diet; 2 gm NA Diet; Low Saturated Fat Diet    Prophylaxis: PCD's, ambulation, and is on DOAC.  Palmer Catheter: Not present  Lines: None     Code Status: Full Code    Disposition Plan   Medically Ready for Discharge: Anticipated in 2-4 Days, h continue IV diuresis his weight is closer to his baseline, most likely will be related to discharge on Wednesday, 6/7/2024    Expected discharge to home pending stability/improvement/resolution of above acute issues.    Entered: Stefan Garcia MD 08/06/2024, 3:01 PM       Medical Decision Making       -------------------------- BILLING ON TIME ------------------------------------------------------------------------------------------------------------  35 MINUTES SPENT BY ME on the date of service doing chart review, history, exam, documentation & further activities per the note.        Interval History     No acute events overnight  Patient seen and evaluated in his room today, feeling better, was ambulating much better yesterday, dyspnea is improving  No CP  No fever / chills / headache / dizziness lightheadedness  Reports lower extremity swelling is improving.    His weight is down to 298  No new complaints, overall happy with progress he has made    * Data reviewed today: I reviewed all new labs and imaging over the last 24 hours. I personally reviewed labs    Physical Exam   Most recent vitals:   , Blood pressure 116/69, pulse 60, temperature 97.5  F (36.4  C), temperature source Oral, resp. rate 18, weight " 135.3 kg (298 lb 3.2 oz), SpO2 91%. O2 Device: None (Room air)    Vitals:    08/04/24 0500 08/05/24 0615 08/06/24 0600   Weight: 136.8 kg (301 lb 9.6 oz) 135.4 kg (298 lb 6.4 oz) 135.3 kg (298 lb 3.2 oz)     Vital signs with ranges:  Temp:  [97.5  F (36.4  C)-98.3  F (36.8  C)] 97.5  F (36.4  C)  Pulse:  [58-69] 60  Resp:  [18] 18  BP: (108-123)/(69-79) 116/69  SpO2:  [91 %-94 %] 91 %  Patient Vitals for the past 24 hrs:   BP Temp Temp src Pulse Resp SpO2 Weight   08/06/24 1115 116/69 97.5  F (36.4  C) Oral 60 18 91 % --   08/06/24 0731 120/78 97.7  F (36.5  C) Oral 58 18 94 % --   08/06/24 0600 -- -- -- -- -- -- 135.3 kg (298 lb 3.2 oz)   08/05/24 2232 114/78 97.8  F (36.6  C) Oral -- 18 94 % --   08/05/24 1945 123/79 97.5  F (36.4  C) Oral 69 18 93 % --   08/05/24 1609 108/69 98.3  F (36.8  C) Oral 62 18 93 % --     I/O's last 24 hours:  I/O last 3 completed shifts:  In: 1170 [P.O.:1170]  Out: 2000 [Urine:2000]    Constitutional: awake, alert, oriented, pleasant, conversant, in no acute distress  Cardiovascular: Regular rhythm, S1-S2 normal.    Lungs: clear to auscultation bilaterally without crackles or wheezes,  Gastrointestinal/Abdomen: soft, non-tender, non-distended, positive bowel sounds  Skin: bilateral lower extremities with 1+ pitting edema  Neurologic: Alert and conversing appropriately, moving all extremities, fluent speech  Psychiatric: Calm and pleasant       Data    Labs reviewed.  Recent Labs   Lab 08/06/24  0628 08/05/24  0715 08/04/24  2239 08/04/24  1558 08/04/24  0624 08/03/24  0616 08/02/24  0531 08/01/24  0606   WBC  --   --   --   --   --  10.2 11.4*  --    HGB  --   --   --   --   --  12.6* 12.9*  --    MCV  --   --   --   --   --  101* 102*  --    PLT  --   --   --   --   --  223 222  --    INR  --   --   --   --   --   --   --  2.47*    141  --   --  139 137 136 139   POTASSIUM 3.6 3.7 3.7   < > 3.2* 4.8 5.0 3.5   CHLORIDE 99 99  --   --  97* 100 99 96*   CO2 35* 34*  --   --  34*  32* 31* 34*   BUN 29.7* 35.1*  --   --  35.3* 33.5* 50.5* 42.2*   CR 1.18* 1.37*  --   --  1.41* 1.41* 2.31* 1.78*   ANIONGAP 6* 8  --   --  8 5* 6* 9   ASHELY 9.2 8.9  --   --  8.6* 9.2 8.6* 8.8   GLC 92 93  --   --  100* 95 128* 113*   ALBUMIN  --   --   --   --   --  3.6 3.6  --     < > = values in this interval not displayed.     Recent Labs   Lab Test 05/05/24  1515 05/05/24  1003 02/11/24  0848 02/11/24  0220 11/28/23  0623   NT-PROBNP, INPATIENT  --  1,202*  --  442 160   TROPONIN T HIGH SENSITIVITY 85* 90* 59* 57*  --      Recent Labs   Lab 08/06/24  0628 08/05/24  0715 08/04/24  0624 08/03/24  0616 08/02/24  0531 08/01/24  0606   GLC 92 93 100* 95 128* 113*     Recent Labs   Lab Test 02/25/19  0756 10/12/17  0814   A1C 5.7* 5.7       Recent Labs   Lab 08/01/24  0606   INR 2.47*     Recent Labs   Lab 08/03/24  0616 08/02/24  0531 07/31/24  1222   WBC 10.2 11.4*  --    LACT  --   --  0.8       MICRO:  CULTURES (INCLUDING BLOOD AND URINE):  No lab results found in last 7 days.    No results found for this or any previous visit (from the past 24 hour(s)).          Medications   All medications were reviewed. MAR.    Infusions:  Current Facility-Administered Medications   Medication Dose Route Frequency Provider Last Rate Last Admin    - MEDICATION INSTRUCTIONS -   Does not apply DOES NOT GO TO Courtney Blackmon APRN CNP        bumetanide (BUMEX) 0.25 mg/mL infusion  0.25 mg/hr Intravenous Continuous Genet Desouza PA-C 1 mL/hr at 08/06/24 0934 0.25 mg/hr at 08/06/24 0934    Continuing ACE inhibitor/ARB/ARNI from home medication list OR ACE inhibitor/ARB/ARNI order already placed during this visit   Does not apply DOES NOT GO TO Courtney Blackmon APRN CNP        Continuing beta blocker from home medication list OR beta blocker order already placed during this visit   Does not apply DOES NOT GO TO Courtney Blackmon APRN CNP        Give 1/2 of usual dose of LONG ACTING insulin AM of procedure IF diabetic    Does not apply Continuous PRN Shanthi Wilkins APRN CNP        May take oral meds with sip of water, the morning of Cardioversion procedure.   Does not apply Continuous PRN Shanthi Wilkins APRN CNP        Patient is already receiving anticoagulation with heparin, enoxaparin (LOVENOX), warfarin (COUMADIN)  or other anticoagulant medication   Does not apply Continuous PRN Courtney Roy APRN CNP         Scheduled Medications:  Current Facility-Administered Medications   Medication Dose Route Frequency Provider Last Rate Last Admin    allopurinol (ZYLOPRIM) tablet 300 mg  300 mg Oral Daily Courtney Roy APRN CNP   300 mg at 08/06/24 0936    cetirizine (zyrTEC) tablet 10 mg  10 mg Oral QPM Courtney Roy APRN CNP   10 mg at 08/05/24 2043    finasteride (PROPECIA) tablet 1 mg  1 mg Oral Daily Courtney Roy APRN CNP   1 mg at 08/06/24 0936    losartan (COZAAR) tablet 50 mg  50 mg Oral Daily Trevin Zuleta MD   50 mg at 08/06/24 0936    miconazole (MICATIN) 2 % powder   Topical BID Masha Moy MD   Given at 08/06/24 0939    polyethylene glycol (MIRALAX) Packet 17 g  17 g Oral Daily Courtney Roy APRN CNP   17 g at 08/06/24 0936    potassium chloride pushpa ER (KLOR-CON M20) CR tablet 40 mEq  40 mEq Oral TID Yasir Pérez MD   40 mEq at 08/06/24 1214    predniSONE (DELTASONE) tablet 10 mg  10 mg Oral Daily Courtney Roy APRN CNP   10 mg at 08/06/24 0936    Followed by    [START ON 8/9/2024] predniSONE (DELTASONE) tablet 5 mg  5 mg Oral Daily Courtney Roy APRN CNP        psyllium (METAMUCIL/KONSYL) capsule 1-2 capsule  1-2 capsule Oral Daily Courtney Roy APRN CNP   2 capsule at 08/06/24 0936    rivaroxaban ANTICOAGULANT (XARELTO) tablet 20 mg  20 mg Oral Daily with supper Courtney Roy APRN CNP   20 mg at 08/05/24 1633    sodium chloride (PF) 0.9% PF flush 3 mL  3 mL Intracatheter Q8H Courtney Roy APRN CNP   3 mL at 08/05/24 2232    sotalol (BETAPACE) tablet 80 mg  80 mg  Oral Q12H ONEAL (08/20) Natalie Campos PA-C   80 mg at 08/06/24 1141    spironolactone (ALDACTONE) tablet 25 mg  25 mg Oral Daily Narendra Thomas MD   25 mg at 08/06/24 0936     PRN Medications:  Current Facility-Administered Medications   Medication Dose Route Frequency Provider Last Rate Last Admin    - MEDICATION INSTRUCTIONS -   Does not apply DOES NOT GO TO Courtney Blackmon APRN CNP        acetaminophen (TYLENOL) tablet 650 mg  650 mg Oral Q4H PRN Courtney Roy APRN CNP        Or    acetaminophen (TYLENOL) Suppository 650 mg  650 mg Rectal Q4H PRCourtney Diggs APRN CNP        calcium carbonate (TUMS) chewable tablet 1,000 mg  1,000 mg Oral 4x Daily PRN Courtney Roy APRN CNP        Continuing ACE inhibitor/ARB/ARNI from home medication list OR ACE inhibitor/ARB/ARNI order already placed during this visit   Does not apply DOES NOT GO TO Courtney Blackmon APRN CNP        Continuing beta blocker from home medication list OR beta blocker order already placed during this visit   Does not apply DOES NOT GO TO Courtney Blackmon APRN CNP        Give 1/2 of usual dose of LONG ACTING insulin AM of procedure IF diabetic   Does not apply Continuous PRN Shanthi Wilkins APRN CNP        HOLD Digoxin (Lanoxin) AM of procedure IF on digoxin   Does not apply HOLD Shanthi Wilkins APRN CNP        HOLD Insulin - RAPID/SHORT acting AM of procedure IF diabetic   Does not apply HOLD Shanthi Wilkins APRN CNP        HOLD Insulin - REGULAR AM of procedure IF diabetic   Does not apply HOLD Shanthi Wilkins APRN CNP        HOLD Oral hypoglycemics AM of procedure IF diabetic   Does not apply HOLD Shanthi Wilkins APRN CNP        lidocaine (LMX4) cream   Topical Q1H PRN Courtney Roy APRN CNP        lidocaine 1 % 0.1-1 mL  0.1-1 mL Other Q1H PRN Courtney Roy APRN CNP        magnesium sulfate 2 g in 50 mL sterile water intermittent infusion  2 g Intravenous Once PRN Shanthi Wilkins APRN CNP        May take oral  meds with sip of water, the morning of Cardioversion procedure.   Does not apply Continuous PRN Shanthi Wilkins APRN CNP        melatonin tablet 1 mg  1 mg Oral At Bedtime PRN Courtney Roy APRN CNP        Patient is already receiving anticoagulation with heparin, enoxaparin (LOVENOX), warfarin (COUMADIN)  or other anticoagulant medication   Does not apply Continuous PRN Courtney Roy APRN CNP        potassium chloride pushpa ER (KLOR-CON M20) CR tablet 20 mEq  20 mEq Oral Once PRN Shanthi Wilkins APRN CNP        potassium chloride pushpa ER (KLOR-CON M20) CR tablet 40 mEq  40 mEq Oral Once PRN Shanthi Wilkins APRN CNP        prochlorperazine (COMPAZINE) injection 5 mg  5 mg Intravenous Q6H PRN Courtney Roy APRN CNP        Or    prochlorperazine (COMPAZINE) tablet 5 mg  5 mg Oral Q6H PRN Courtney Roy APRN CNP        Or    prochlorperazine (COMPAZINE) suppository 12.5 mg  12.5 mg Rectal Q12H PRN Courtney Roy APRN CNP        senna-docusate (SENOKOT-S/PERICOLACE) 8.6-50 MG per tablet 1 tablet  1 tablet Oral BID PRN Courtney Roy APRN CNP        Or    senna-docusate (SENOKOT-S/PERICOLACE) 8.6-50 MG per tablet 2 tablet  2 tablet Oral BID PRN Courtney Roy APRN CNP        sodium chloride (PF) 0.9% PF flush 3 mL  3 mL Intravenous Q1H PRN Shanthi Wilkins APRN CNP        sodium chloride (PF) 0.9% PF flush 3 mL  3 mL Intracatheter q1 min prn Courtney Roy APRN CNP        zolpidem (AMBIEN) tablet 5 mg  5 mg Oral At Bedtime PRN Narendra Thomas MD   5 mg at 08/05/24 4651

## 2024-08-06 NOTE — PROGRESS NOTES
[facility]    ~Cardiology Progress Note~    Primary Cardiologist: Dr. Day     Date of Admission: 7/25/2024  Service Date: 08/06/2024    Summary:  Mr. Mike Schultz is a very pleasant 73 year old male with a past medical history of persistent atrial fibrillation, HFpEF, moderate aortic stenosis, hypertension, BOWEN, alcohol use disorder, Bell's palsy, lung nodules who was admitted from the EP clinic after no response after infusion clinic on 7/24/2024.     Interval August 6, 2024:  Hasn't been up ambulating much this morning to assess his shortness of breath. Denies chest pain, lightheadedness, and palpitations. Down 12 lbs since admission, weight 298 lbs today. Electrolytes stable. Creatinine down trending, 1.18 today. Reviewed echocardiogram results at the bedside.    Telemetry reviewed- sinus rhythm          Assessment:     Acute on chronic diastolic heart failure, likely triggered by atrial fibrillation   -LVEF: 55% noted on echo 5/2024  -Fluid Status: hypervolemic, discharge weight from previous admission in 5/2024 was 278#  -Diuretic Regimen: PTA torsemide 80 mg daily   -Ischemic Eval: lexiscan stress test 5/2024 noted below   -GDMT PTA:  Beta blocker: metoprolol tartrate 12.5 mg BID   ACEI/ARB/ARNI: losartan 50 mg daily   Aldactone antagonist: spironolactone 25 mg daily   SGLT2 inhibitor: none      Persistent atrial fibrillation with multiple attempts at rhythm control, cardioversions, and ablation attempts  [id number] Score 4 (age, CHF, HTN, aortic plaque)  Anticoagulated with Xarelto   S/p DCCV on 8/1/2024 with restoration of SR, on sotalol     Moderate-severe aortic stenosis, mean gradient 31 mmHg, echo 8/5     Ascending aorta dilatation, measuring 4.0 cm, echo 8/5    History of abnormal nuclear stress test during admission 5/2024  Lexiscan stress test 5/2024- abnormal, small area of ischemia in the apical segments of the left ventricle, small area of nontransmural infarction  in the basal inferolateral segments of the left ventricle associated with a mild degree of iva-infarct ischemia involving basal to mid inferior wall   This was thought to possibly be a false positive finding due to body habitus, but recommendation was a left and right heart cath when patients pulmonary status returned to baseline     HTN   BOWEN, compliant with CPAP   Myocardial scar noted on cardiac MRI   History of alcohol dependence            Plan:     Start Bumex gtt at 0.25 mg/hr  Discontinue IV bumex 2 mg BID   Continue the following cardiology medication regimen   Losartan 50 mg daily   Xarelto 20 mg daily   Spironolactone 25 mg daily   Daily standing weight, strict I/O, daily BMP and repletion of electrolytes   Coordinating outpatient cardiology follow up   Recommend right and left heart cath in the outpatient setting   Cardiology to follow       Plan of care was formulated under the direction and guidance of Dr. Pérez.         Genet Desouza PA-C  Physician Assistant   [facility]  Pager: [phone]      25 minutes spent in coordination of care, and discussion with the patient and/or family regarding diagnostic results, prognosis, symptom management, risks and benefits of management options, and development of the plan of care of above.        Patient Active Problem List   Diagnosis    Essential hypertension    Impotence of organic origin    Allergic rhinitis    Herpes simplex virus (HSV) infection    Class 3 obesity (H)    Tobacco use disorder, moderate, in sustained remission    Atrial fibrillation, permanent (H)    Family history of ischemic heart disease    Gout    BOWEN (obstructive sleep apnea)    Ascending aorta dilatation (H24)    Impaired fasting glucose    Aortic valve stenosis    Venous insufficiency    Bilateral carpal tunnel syndrome    Other cardiomyopathies (H)    Leg wound, right    Lower extremity edema    Acute respiratory failure with hypoxia (H)    Acute on chronic  congestive heart failure, unspecified heart failure type (H)    Acute decompensated heart failure (H)       Physical Exam   Temp: 97.8  F (36.6  C) Temp src: Oral BP: 114/78 Pulse: 69   Resp: 18 SpO2: 94 % O2 Device: None (Room air)    Vitals:    08/04/24 0500 08/05/24 0615 08/06/24 0600   Weight: 136.8 kg (301 lb 9.6 oz) 135.4 kg (298 lb 6.4 oz) 135.3 kg (298 lb 3.2 oz)     I/O last 3 completed shifts:  In: 1500 [P.O.:1500]  Out: 1420 [Urine:1420]    Constitutional: Appears stated age, well nourished, NAD.  Neck: Supple.  JVD not visualized.   Respiratory: Non-labored. Lungs CTAB.  Cardiovascular: RRR, with holosystolic murmur. Bilateral lower extremities with lymphedema.   GI: Soft, non-distended, non-tender.  Skin: Warm and dry.   Musculoskeletal/Extremities: Symmetrical movement. Lymphedema wear to BLE.  Neurologic: No gross focal deficits. Alert, awake.  Psychiatric: Affect appropriate. Mentation normal.    Medications   Current Facility-Administered Medications   Medication Dose Route Frequency Provider Last Rate Last Admin    - MEDICATION INSTRUCTIONS -   Does not apply DOES NOT GO TO Courtney Blackmon APRN CNP        Continuing ACE inhibitor/ARB/ARNI from home medication list OR ACE inhibitor/ARB/ARNI order already placed during this visit   Does not apply DOES NOT GO TO Courtney Blackmon APRN CNP        Continuing beta blocker from home medication list OR beta blocker order already placed during this visit   Does not apply DOES NOT GO TO Courtney Blackmon APRN CNP        Give 1/2 of usual dose of LONG ACTING insulin AM of procedure IF diabetic   Does not apply Continuous PRN Shanthi Wilkins APRN CNP        May take oral meds with sip of water, the morning of Cardioversion procedure.   Does not apply Continuous PRN Shanthi Wilkins APRN CNP        Patient is already receiving anticoagulation with heparin, enoxaparin (LOVENOX), warfarin (COUMADIN)  or other anticoagulant medication   Does not apply  Continuous PRN Courtney Roy APRN CNP        [Held by provider] sodium chloride 0.9 % infusion   Intravenous Continuous Ajay Almazan MD   Stopped at 08/01/24 1022     Current Facility-Administered Medications   Medication Dose Route Frequency Provider Last Rate Last Admin    allopurinol (ZYLOPRIM) tablet 300 mg  300 mg Oral Daily Courtney Roy APRN CNP   300 mg at 08/05/24 0932    bumetanide (BUMEX) injection 2 mg  2 mg Intravenous BID Narendra Thomas MD   2 mg at 08/05/24 1633    cetirizine (zyrTEC) tablet 10 mg  10 mg Oral QPM Courtney Roy APRN CNP   10 mg at 08/05/24 2043    finasteride (PROPECIA) tablet 1 mg  1 mg Oral Daily Courtney Roy APRN CNP   1 mg at 08/05/24 0932    losartan (COZAAR) tablet 50 mg  50 mg Oral Daily Trevin Zuleta MD   50 mg at 08/05/24 0932    miconazole (MICATIN) 2 % powder   Topical BID Masha Moy MD   Given at 08/05/24 0943    polyethylene glycol (MIRALAX) Packet 17 g  17 g Oral Daily Courtney Roy APRN CNP   17 g at 08/05/24 0932    [Held by provider] potassium chloride pushpa ER (KLOR-CON M20) CR tablet 40 mEq  40 mEq Oral BID Yasir Pérez MD        predniSONE (DELTASONE) tablet 10 mg  10 mg Oral Daily Courtney Roy APRN CNP   10 mg at 08/05/24 0932    Followed by    [START ON 8/9/2024] predniSONE (DELTASONE) tablet 5 mg  5 mg Oral Daily Courtney Roy APRN CNP        psyllium (METAMUCIL/KONSYL) capsule 1-2 capsule  1-2 capsule Oral Daily Courtney Roy APRN CNP   2 capsule at 08/05/24 1128    rivaroxaban ANTICOAGULANT (XARELTO) tablet 20 mg  20 mg Oral Daily with supper Courtney Roy APRN CNP   20 mg at 08/05/24 1633    sodium chloride (PF) 0.9% PF flush 3 mL  3 mL Intracatheter Q8H Courtney Roy APRN CNP   3 mL at 08/05/24 2239    sotalol (BETAPACE) tablet 80 mg  80 mg Oral Q12H [facility] (08/20) Natalie Campos PA-C   80 mg at 08/05/24 2238    spironolactone (ALDACTONE) tablet 25 mg  25 mg Oral Daily Narendra Thomas  MD Mitchell   25 mg at 24 0932       Data   Last 24 hours labs personally reviewed.  Echo:   Recent Results (from the past 4320 hour(s))   Echocardiogram Complete   Result Value    LVEF  55-60%    Narrative    [id number]  [id number]  [id number]  [id number]^REINA^LAURA^TRAVIS     [facility]  Echocardiography Laboratory  [address] [place] [address]     Name: TOO HOWARD  MRN: [id number]  : 1950  Study Date: 2024 10:15 AM  Age: 73 yrs  Gender: Male  Patient Location: [facility]  Reason For Study: Aortic Valve Disorder  Ordering Physician: LAURA HUANG  Referring Physician: Ranjan Courtney,  Performed By: Norah Suarez     BSA: 2.4 m2  Height: 68 in  Weight: 298 lb  HR: 66  BP: 122/76 mmHg  ______________________________________________________________________________  Procedure  Complete Portable Echo Adult. Optison (NDC #[id number]) given intravenously.  ______________________________________________________________________________  Interpretation Summary     Left ventricular systolic function is normal.  The visual ejection fraction is 55-60%.  There is moderate concentric left ventricular hypertrophy.  The right ventricle is normal in structure, function and size.  Regional wall motion abnormalities cannot be excluded due to limited  visualization despite use of contrast.  Calcified aortic valve. Vmax 3.5 m/sec, mean gradient 31 mmHg, MARILYN 0.9 cm2 by  continuity equation. DVI 0.22. Findings consistent with moderate-severe aortic  stenosis. Doppler interrogation is at a borderline reduced stroke volume index  (38 ml/m2).  Dilation of the inferior vena cava is present with abnormal respiratory  variation in diameter.  There is no pericardial effusion.  Ascending aorta dilatation is present (4.0 cm).     Compared to study dated 2024, there is slight progression of aortic  stenosis however interstudy variabilty in Doppler interrogation is possible  given poor  acoustic windows. Otherwise findings are similar.  ______________________________________________________________________________  Left Ventricle  The left ventricle is normal in size. There is moderate concentric left  ventricular hypertrophy. Left ventricular systolic function is normal. The  visual ejection fraction is 55-60%. Left ventricular diastolic function is  indeterminate. Regional wall motion abnormalities cannot be excluded due to  limited visualization.     Right Ventricle  The right ventricle is normal in structure, function and size.     Atria  The left atrium is mildly dilated. Right atrial size is normal.     Mitral Valve  There is mild to moderate mitral annular calcification. There is mild (1+)  mitral regurgitation.     Tricuspid Valve  The tricuspid valve is not well visualized. Right ventricular systolic  pressure could not be approximated due to inadequate tricuspid regurgitation.     Aortic Valve  The aortic valve is not well visualized. Moderate valvular aortic stenosis.  Vmax 3.5 m/sec, mean gradient 31 mmHg, MARILYN 0.9 cm2 by continuity equation. DVI  0.22. Findings consistent with moderate-severe aortic stenosis. Doppler  interrogation is at a borderline reduced stroke volume index (38 ml/m2).     Pulmonic Valve  The pulmonic valve is normal in structure and function. There is trace  pulmonic valvular regurgitation.     Vessels  The aortic root is normal size. Ascending aorta dilatation is present.  Dilation of the inferior vena cava is present with abnormal respiratory  variation in diameter.     Pericardium  There is no pericardial effusion.     ______________________________________________________________________________  MMode/2D Measurements & Calculations  IVSd: 1.3 cm  LVIDd: 6.2 cm  LVIDs: 4.6 cm  LVPWd: 1.5 cm  FS: 25.3 %  LV mass(C)d: 398.2 grams  LV mass(C)dI: 164.5 grams/m2     Ao root diam: 3.6 cm  LA dimension: 4.7 cm  asc Aorta Diam: 4.0 cm  LA/Ao: 1.3  LVOT diam: 2.5  cm  LVOT area: 4.8 cm2  Ao root diam index Ht(cm/m): 2.1  Ao root diam index BSA (cm/m2): 1.5  Asc Ao diam index BSA (cm/m2): 1.6  Asc Ao diam index Ht(cm/m): 2.3  LA Volume (BP): 93.3 ml  LA Volume Index (BP): 38.6 ml/m2  RWT: 0.47     TAPSE: 3.3 cm     Doppler Measurements & Calculations  MV E max seamus: 86.5 cm/sec  MV A max seamus: 48.8 cm/sec  MV E/A: 1.8  MV dec slope: 455.2 cm/sec2  MV dec time: 0.19 sec  Ao V2 max: 354.4 cm/sec  Ao max P.0 mmHg  Ao V2 mean: 267.2 cm/sec  Ao mean P.5 mmHg  Ao V2 VTI: 97.8 cm  MARILYN(I,D): 0.95 cm2  MARILYN(V,D): 1.1 cm2  LV V1 max P.5 mmHg  LV V1 max: 79.3 cm/sec  LV V1 VTI: 19.5 cm  SV(LVOT): 93.4 ml  SI(LVOT): 38.6 ml/m2  PA acc time: 0.15 sec  AV Seamus Ratio (DI): 0.22  MARILYN Index (cm2/m2): 0.39  E/E' av.9  Lateral E/e': 8.8  Medial E/e': 18.9  RV S Seamus: 13.9 cm/sec     ______________________________________________________________________________  Report approved by: Laurie Pereira 2024 12:54 PM         Echocardiogram Complete   Result Value    LVEF  55%    Narrative    [id number]  [id number]  [id number]  [id number]^GAMALIEL^JOAN^P     [facility]  Echocardiography Laboratory  [address] [place] [id number]     Name: LEETOO LÓPEZ BRAXTON  MRN: [id number]  : 1950  Study Date: 2024 11:48 AM  Age: 73 yrs  Gender: Male  Patient Location: [facility]  Reason For Study: Heart Failure  Ordering Physician: JOAN ALSTON  Referring Physician: Ranjan Courtney  Performed By: Norah Suarez     BSA: 2.4 m2  Height: 68 in  Weight: 291 lb  HR: 75  BP: 110/80 mmHg  ______________________________________________________________________________  Procedure  Complete Portable Echo Adult.  ______________________________________________________________________________  Interpretation Summary     Technically challenging study due to patient body habitus.     The left ventricle is borderline dilated. There is mild concentric left  ventricular  hypertrophy.  Left ventricular systolic function is low normal. The visual ejection fraction  is estimated at 55%. Regional wall motion challenging to assess due to  frequent ectopy/irregular R-R intervals however no obvious regional wall  motion abnormalities seen.  The right ventricle is borderline dilated. The right ventricular systolic  function is normal.  Moderate aortic stenosis, Vmax 3.7 m/s, mean gradient 35 mmHg, MARILYN 1.1cm2, DI  0.26. SVi 44 cc/m2. There is mild (1+) aortic regurgitation. Aortic valve  morphology is not well visualized, however it is heavily calcified.  There is no pericardial effusion.  The inferior vena cava was normal in size with preserved respiratory  variability.     This study was compared to a TTE from 2/11/2024. Global LV systolic function  is marginally less dynamic on this present study.  ______________________________________________________________________________  Left Ventricle  The left ventricle is borderline dilated. There is mild concentric left  ventricular hypertrophy. Left ventricular systolic function is low normal. The  visual ejection fraction is estimated at 55%. Grade III or advanced diastolic  dysfunction. Regional wall motion challenging to assess due to frequent  ectopy/irregular R-R intervals however no obvious regional wall motion  abnormalities seen.     Right Ventricle  The right ventricle is borderline dilated. The right ventricular systolic  function is normal.     Atria  The left atrium is moderately dilated. Right atrial size is normal.     Mitral Valve  There is mild mitral annular calcification. There is mild (1+) mitral  regurgitation.     Tricuspid Valve  The tricuspid valve is not well visualized, but is grossly normal.     Aortic Valve  Aortic valve morphology is not well visualized, however it is heavily  calcified. There is mild (1+) aortic regurgitation. Moderate aortic stenosis,  Vmax 3.7 m/s, mean gradient 35 mmHg, MARILYN 1.1cm2, DI 0.26. SVi  44 cc/m2.     Pulmonic Valve  The pulmonic valve is not well seen, but is grossly normal.     Vessels  The aortic root is normal size. Ascending aorta dilatation is present. The  inferior vena cava was normal in size with preserved respiratory variability.     Pericardium  There is no pericardial effusion.     Rhythm  The rhythm was undetermined.  ______________________________________________________________________________  MMode/2D Measurements & Calculations  IVSd: 1.1 cm     LVIDd: 5.9 cm  LVIDs: 3.9 cm  LVPWd: 1.2 cm  FS: 34.8 %  LV mass(C)d: 300.5 grams  LV mass(C)dI: 125.3 grams/m2  Ao root diam: 3.5 cm  LA dimension: 4.8 cm  asc Aorta Diam: 4.0 cm  LA/Ao: 1.3  LVOT diam: 2.4 cm  LVOT area: 4.5 cm2  Ao root diam index Ht(cm/m): 2.1  Ao root diam index BSA (cm/m2): 1.5  Asc Ao diam index BSA (cm/m2): 1.7  Asc Ao diam index Ht(cm/m): 2.3  LA Volume (BP): 111.0 ml     LA Volume Index (BP): 46.3 ml/m2  RWT: 0.42  TAPSE: 1.7 cm     Doppler Measurements & Calculations  MV E max seamus: 115.0 cm/sec  MV A max seamus: 50.9 cm/sec  MV E/A: 2.3  MV dec slope: 682.9 cm/sec2  MV dec time: 0.17 sec  Ao V2 max: 365.0 cm/sec  Ao max P.3 mmHg  Ao V2 mean: 284.5 cm/sec  Ao mean P.5 mmHg  Ao V2 VTI: 93.2 cm  MARILYN(I,D): 1.1 cm2  MARILYN(V,D): 1.2 cm2  AI P1/2t: 415.1 msec  LV V1 max PG: 3.6 mmHg  LV V1 max: 95.2 cm/sec  LV V1 VTI: 23.2 cm  SV(LVOT): 105.1 ml  SI(LVOT): 43.8 ml/m2  PA acc time: 0.08 sec  AV Seamus Ratio (DI): 0.26  MARILYN Index (cm2/m2): 0.47  E/E' av.4     Lateral E/e': 21.2  Medial E/e': 19.5  RV S Seamus: 10.1 cm/sec     ______________________________________________________________________________  Report approved by: Laurie Baires 2024 03:36 PM         Echocardiogram Complete   Result Value    LVEF  55-60%    Narrative    [id number]  [id number]  [id number]  [id number]^DENNISE^EDITA^MIR     [facility]  Echocardiography Laboratory  [address] [place] [id number]     Name: LEE  TOO LIMON  MRN: [id number]  : 1950  Study Date: 2024 09:42 AM  Age: 73 yrs  Gender: Male  Patient Location: [facility]  Reason For Study: Heart Failure  Ordering Physician: EDITA BOWDEN  Performed By: Millicent Rosenberg     BSA: 2.4 m2  Height: 68 in  Weight: 295 lb  HR: 92  BP: 130/75 mmHg  ______________________________________________________________________________  Procedure  Complete Portable Echo Adult. Optison (NDC #[id number]) given intravenously.  ______________________________________________________________________________  Interpretation Summary     The visual ejection fraction is 55-60%.  There is moderate concentric left ventricular hypertrophy.  There is mild (1+) mitral regurgitation.  Moderate valvular aortic stenosis.  Ascending aorta dilatation is present.  The rhythm was atrial fibrillation.  The patient exhibited frequent PVCs.  There is moderate biatrial enlargement.  The study was technically difficult. Contrast was used without apparent  complications. Compared to prior study, changes are noted.  ______________________________________________________________________________  Left Ventricle  The left ventricle is normal in size. There is moderate concentric left  ventricular hypertrophy. The visual ejection fraction is 55-60%. Left  ventricular diastolic function is abnormal.     Right Ventricle  The right ventricle is normal in structure, function and size.     Atria  There is moderate biatrial enlargement.     Mitral Valve  There is mild mitral annular calcification. The mitral valve leaflets are  mildly thickened. There is mild (1+) mitral regurgitation.     Tricuspid Valve  The tricuspid valve is not well visualized, but is grossly normal.     Aortic Valve  Moderate valvular aortic stenosis.     Pulmonic Valve  The pulmonic valve is not well visualized.     Vessels  The aortic root is normal size. Ascending aorta dilatation is present.     Pericardium  There is no pericardial  effusion.     Rhythm  The rhythm was atrial fibrillation. The patient exhibited frequent PVCs.  ______________________________________________________________________________  MMode/2D Measurements & Calculations     IVSd: 1.4 cm  LVIDd: 5.4 cm  LVIDs: 4.3 cm  LVPWd: 1.4 cm  FS: 20.9 %  LV mass(C)d: 327.0 grams  LV mass(C)dI: 135.6 grams/m2  Ao root diam: 3.7 cm  LA dimension: 5.3 cm  asc Aorta Diam: 4.0 cm  LA/Ao: 1.4  LVOT diam: 2.4 cm  LVOT area: 4.4 cm2  Ao root diam index Ht(cm/m): 2.1  Ao root diam index BSA (cm/m2): 1.5  Asc Ao diam index BSA (cm/m2): 1.7  Asc Ao diam index Ht(cm/m): 2.3  LA Volume (BP): 72.2 ml  RV Base: 4.1 cm  RWT: 0.52  TAPSE: 2.4 cm     Doppler Measurements & Calculations  MV E max seamus: 138.4 cm/sec  MV dec slope: 814.1 cm/sec2  MV dec time: 0.17 sec  Ao V2 max: 364.4 cm/sec  Ao max P.0 mmHg  Ao V2 mean: 278.0 cm/sec  Ao mean P.9 mmHg  Ao V2 VTI: 86.1 cm  MARILYN(I,D): 1.0 cm2  MARILYN(V,D): 1.1 cm2  LV V1 max PG: 3.2 mmHg  LV V1 max: 90.0 cm/sec  LV V1 VTI: 20.1 cm  SV(LVOT): 88.7 ml  SI(LVOT): 36.8 ml/m2  PA acc time: 0.09 sec  AV Seamus Ratio (DI): 0.25  MARILYN Index (cm2/m2): 0.43  E/E' av.4  Lateral E/e': 16.8  Medial E/e': 16.0  RV S Seamus: 15.6 cm/sec     ______________________________________________________________________________  Report approved by: Laurie Garcia 2024 01:05 PM

## 2024-08-07 ENCOUNTER — APPOINTMENT (OUTPATIENT)
Dept: OCCUPATIONAL THERAPY | Facility: CLINIC | Age: 74
DRG: 286 | End: 2024-08-07
Attending: STUDENT IN AN ORGANIZED HEALTH CARE EDUCATION/TRAINING PROGRAM
Payer: COMMERCIAL

## 2024-08-07 LAB
ANION GAP SERPL CALCULATED.3IONS-SCNC: 8 MMOL/L (ref 7–15)
ATRIAL RATE - MUSE: 47 BPM
BUN SERPL-MCNC: 28.2 MG/DL (ref 8–23)
CALCIUM SERPL-MCNC: 9.2 MG/DL (ref 8.8–10.4)
CHLORIDE SERPL-SCNC: 99 MMOL/L (ref 98–107)
CREAT SERPL-MCNC: 1.2 MG/DL (ref 0.67–1.17)
DIASTOLIC BLOOD PRESSURE - MUSE: NORMAL MMHG
EGFRCR SERPLBLD CKD-EPI 2021: 64 ML/MIN/1.73M2
GLUCOSE SERPL-MCNC: 96 MG/DL (ref 70–99)
HCO3 SERPL-SCNC: 33 MMOL/L (ref 22–29)
HOLD SPECIMEN: NORMAL
INTERPRETATION ECG - MUSE: NORMAL
P AXIS - MUSE: 26 DEGREES
POTASSIUM SERPL-SCNC: 3.9 MMOL/L (ref 3.4–5.3)
PR INTERVAL - MUSE: 188 MS
QRS DURATION - MUSE: 74 MS
QT - MUSE: 574 MS
QTC - MUSE: 507 MS
R AXIS - MUSE: 44 DEGREES
SODIUM SERPL-SCNC: 140 MMOL/L (ref 135–145)
SYSTOLIC BLOOD PRESSURE - MUSE: NORMAL MMHG
T AXIS - MUSE: 120 DEGREES
VENTRICULAR RATE- MUSE: 47 BPM

## 2024-08-07 PROCEDURE — 99233 SBSQ HOSP IP/OBS HIGH 50: CPT | Mod: FS

## 2024-08-07 PROCEDURE — 250N000013 HC RX MED GY IP 250 OP 250 PS 637: Performed by: NURSE PRACTITIONER

## 2024-08-07 PROCEDURE — 93005 ELECTROCARDIOGRAM TRACING: CPT

## 2024-08-07 PROCEDURE — 250N000012 HC RX MED GY IP 250 OP 636 PS 637: Performed by: NURSE PRACTITIONER

## 2024-08-07 PROCEDURE — 36415 COLL VENOUS BLD VENIPUNCTURE: CPT | Performed by: HOSPITALIST

## 2024-08-07 PROCEDURE — 250N000013 HC RX MED GY IP 250 OP 250 PS 637: Performed by: INTERNAL MEDICINE

## 2024-08-07 PROCEDURE — 250N000013 HC RX MED GY IP 250 OP 250 PS 637: Performed by: PHYSICIAN ASSISTANT

## 2024-08-07 PROCEDURE — 250N000011 HC RX IP 250 OP 636: Performed by: INTERNAL MEDICINE

## 2024-08-07 PROCEDURE — 99232 SBSQ HOSP IP/OBS MODERATE 35: CPT | Performed by: INTERNAL MEDICINE

## 2024-08-07 PROCEDURE — 210N000002 HC R&B HEART CARE

## 2024-08-07 PROCEDURE — 93010 ELECTROCARDIOGRAM REPORT: CPT | Performed by: INTERNAL MEDICINE

## 2024-08-07 PROCEDURE — 250N000009 HC RX 250

## 2024-08-07 PROCEDURE — 97110 THERAPEUTIC EXERCISES: CPT | Mod: GO | Performed by: OCCUPATIONAL THERAPIST

## 2024-08-07 PROCEDURE — 80048 BASIC METABOLIC PNL TOTAL CA: CPT | Performed by: HOSPITALIST

## 2024-08-07 PROCEDURE — 99232 SBSQ HOSP IP/OBS MODERATE 35: CPT | Performed by: HOSPITALIST

## 2024-08-07 RX ORDER — CHLOROTHIAZIDE SODIUM 500 MG/1
500 INJECTION INTRAVENOUS ONCE
Status: COMPLETED | OUTPATIENT
Start: 2024-08-07 | End: 2024-08-07

## 2024-08-07 RX ORDER — BUMETANIDE 0.25 MG/ML
1 INJECTION INTRAMUSCULAR; INTRAVENOUS ONCE
Status: DISCONTINUED | OUTPATIENT
Start: 2024-08-07 | End: 2024-08-07

## 2024-08-07 RX ORDER — LOSARTAN POTASSIUM 25 MG/1
25 TABLET ORAL DAILY
Status: DISCONTINUED | OUTPATIENT
Start: 2024-08-08 | End: 2024-08-09 | Stop reason: HOSPADM

## 2024-08-07 RX ADMIN — POTASSIUM CHLORIDE 40 MEQ: 1500 TABLET, EXTENDED RELEASE ORAL at 21:39

## 2024-08-07 RX ADMIN — PREDNISONE 10 MG: 10 TABLET ORAL at 08:23

## 2024-08-07 RX ADMIN — BUMETANIDE 0.25 MG/HR: 0.25 INJECTION INTRAMUSCULAR; INTRAVENOUS at 08:25

## 2024-08-07 RX ADMIN — SOTALOL HYDROCHLORIDE 80 MG: 80 TABLET ORAL at 22:27

## 2024-08-07 RX ADMIN — MICONAZOLE NITRATE: 2 POWDER TOPICAL at 21:39

## 2024-08-07 RX ADMIN — POTASSIUM CHLORIDE 40 MEQ: 1500 TABLET, EXTENDED RELEASE ORAL at 16:05

## 2024-08-07 RX ADMIN — SOTALOL HYDROCHLORIDE 80 MG: 80 TABLET ORAL at 10:57

## 2024-08-07 RX ADMIN — CHLOROTHIAZIDE SODIUM 500 MG: 500 INJECTION, POWDER, LYOPHILIZED, FOR SOLUTION INTRAVENOUS at 14:03

## 2024-08-07 RX ADMIN — Medication 1 CAPSULE: at 09:15

## 2024-08-07 RX ADMIN — FINASTERIDE 1 MG: 1 TABLET, FILM COATED ORAL at 09:15

## 2024-08-07 RX ADMIN — ALLOPURINOL 300 MG: 300 TABLET ORAL at 08:24

## 2024-08-07 RX ADMIN — CETIRIZINE HYDROCHLORIDE 10 MG: 10 TABLET, FILM COATED ORAL at 21:39

## 2024-08-07 RX ADMIN — ZOLPIDEM TARTRATE 5 MG: 5 TABLET, COATED ORAL at 22:30

## 2024-08-07 RX ADMIN — POTASSIUM CHLORIDE 40 MEQ: 1500 TABLET, EXTENDED RELEASE ORAL at 08:23

## 2024-08-07 RX ADMIN — POLYETHYLENE GLYCOL 3350 17 G: 17 POWDER, FOR SOLUTION ORAL at 08:27

## 2024-08-07 RX ADMIN — RIVAROXABAN 20 MG: 20 TABLET, FILM COATED ORAL at 16:05

## 2024-08-07 RX ADMIN — SPIRONOLACTONE 25 MG: 25 TABLET ORAL at 08:28

## 2024-08-07 RX ADMIN — MICONAZOLE NITRATE: 2 POWDER TOPICAL at 08:34

## 2024-08-07 ASSESSMENT — ACTIVITIES OF DAILY LIVING (ADL)
ADLS_ACUITY_SCORE: 22

## 2024-08-07 NOTE — PROGRESS NOTES
Renal Medicine Progress Note            Assessment/Plan:     73 y.o gentleman grade III DD and severe hypervolemia, consulted for acute kidney injury     # Patient with excellent kidney function in May.      # Acute kidney injury due to CRS: Improved .     # Decompensated heart failure:  baseline of ~ 270 lbs.  Edema is better     # Moderate to severe aortic stenosis. EF 55-60%. Moderate cLVH.      # Morbid obesity with BMI of 46.      Plan:  # Increase Bumex to 0.5 mg/hr  # Diuril 500 mg IV x 1  # Goal 2-3 liters net negative daily  # Decrease losartan to 25 daily and hold is SBP is <120    I reviewed notes from notes from IM and cardiology.          Interval History:     Afebrile. VSS.  I/O: 1680/2700 for net of 1 liter.Weight is one lb higher today.   Weight is up one lb.   He would like diures more aggressively   Kidney function improved and stable.            Medications and Allergies:     Current Facility-Administered Medications   Medication Dose Route Frequency Provider Last Rate Last Admin    allopurinol (ZYLOPRIM) tablet 300 mg  300 mg Oral Daily Courtney Roy APRN CNP   300 mg at 08/07/24 0824    cetirizine (zyrTEC) tablet 10 mg  10 mg Oral QPM Courtney Roy APRN CNP   10 mg at 08/06/24 2109    finasteride (PROPECIA) tablet 1 mg  1 mg Oral Daily Courtney Roy APRN CNP   1 mg at 08/07/24 0915    [START ON 8/8/2024] losartan (COZAAR) tablet 25 mg  25 mg Oral Daily Parveen Jj MD        miconazole (MICATIN) 2 % powder   Topical BID Masha Moy MD   Given at 08/07/24 0834    polyethylene glycol (MIRALAX) Packet 17 g  17 g Oral Daily Courtney Roy APRN CNP   17 g at 08/07/24 0827    potassium chloride pushpa ER (KLOR-CON M20) CR tablet 40 mEq  40 mEq Oral TID Yasir Pérez MD   40 mEq at 08/07/24 0823    predniSONE (DELTASONE) tablet 10 mg  10 mg Oral Daily Courtney Roy APRN CNP   10 mg at 08/07/24 0823    Followed by    [START ON 8/9/2024] predniSONE (DELTASONE) tablet 5  mg  5 mg Oral Daily Courtney Roy APRN CNP        psyllium (METAMUCIL/KONSYL) capsule 1-2 capsule  1-2 capsule Oral Daily Courtney Roy APRN CNP   1 capsule at 08/07/24 0915    rivaroxaban ANTICOAGULANT (XARELTO) tablet 20 mg  20 mg Oral Daily with supper Courtney Roy APRN CNP   20 mg at 08/06/24 1736    sodium chloride (PF) 0.9% PF flush 3 mL  3 mL Intracatheter Q8H Courtney Roy APRN CNP   3 mL at 08/05/24 2239    sotalol (BETAPACE) tablet 80 mg  80 mg Oral Q12H ONEAL (08/20) Natalie Campos PA-C   80 mg at 08/07/24 1057    spironolactone (ALDACTONE) tablet 25 mg  25 mg Oral Daily Narendra Thomas MD   25 mg at 08/07/24 0828        Allergies   Allergen Reactions    Amiodarone Shortness Of Breath     Suspected amiodarone toxicity involving lungs            Physical Exam:   Vitals were reviewed   , Blood pressure 119/71, pulse 54, temperature 97.7  F (36.5  C), temperature source Oral, resp. rate 18, weight 135.6 kg (299 lb), SpO2 93%.    Wt Readings from Last 3 Encounters:   08/07/24 135.6 kg (299 lb)   07/25/24 142 kg (313 lb 1.6 oz)   07/24/24 140.9 kg (310 lb 10.1 oz)       Intake/Output Summary (Last 24 hours) at 8/7/2024 1146  Last data filed at 8/7/2024 0831  Gross per 24 hour   Intake 1200 ml   Output 2800 ml   Net -1600 ml     GENERAL APPEARANCE: pleasant, NAD, alert  HEENT:  Eyes/ears/nose/neck grossly normal  RESP: lungs cta b c good efforts, no crackles, rhonchi or wheezes  CV: RRR, + tight murmur  ABDOMEN: obese, soft, NT  EXTREMITIES/SKIN: no rashes/lesions on observed skin; 2+ edema  NEURO: Awake, alert and conversing normally.         Data:     CBC RESULTS:     Recent Labs   Lab 08/03/24  0616 08/02/24  0531   WBC 10.2 11.4*   RBC 3.97* 3.84*   HGB 12.6* 12.9*   HCT 40.1 39.2*    222       Basic Metabolic Panel:  Recent Labs   Lab 08/07/24  0623 08/06/24  0628 08/05/24  0715 08/04/24  2239 08/04/24  1558 08/04/24  0624 08/03/24  0616 08/02/24  0531    140 141  --    --  139 137 136   POTASSIUM 3.9 3.6 3.7 3.7 3.3* 3.2* 4.8 5.0   CHLORIDE 99 99 99  --   --  97* 100 99   CO2 33* 35* 34*  --   --  34* 32* 31*   BUN 28.2* 29.7* 35.1*  --   --  35.3* 33.5* 50.5*   CR 1.20* 1.18* 1.37*  --   --  1.41* 1.41* 2.31*   GLC 96 92 93  --   --  100* 95 128*   ASHELY 9.2 9.2 8.9  --   --  8.6* 9.2 8.6*       INR  Recent Labs   Lab 08/01/24  0606   INR 2.47*      Attestation:   I have reviewed today's relevant vital signs, notes, medications, labs and imaging.    Parveen Jj MD  [facility] - Nephrology  Office phone :[phone]  Pager: [phone]

## 2024-08-07 NOTE — PROGRESS NOTES
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     Acute on chronic diastolic CHF exacerbation (HFpEF): Improved  Hypertension (benign essential).  AS, moderate.  [PTA: losartan 50 mg daily; metoprolol 12.5 mg BID; spironolactone 25 mg daily; torsemide 80 mg daily.]  * Echo 5/2024 showed LVEF 55%, no obvious regional wall motion abnormalities; RV borderline dilated with normal systolic function; moderate AS, mild AR.  * On admit, noted primary symptoms of significant weight gain, up to 310 pounds (notes baseline 265-270), diffuse edema, dyspnea. Felt to be multifactorial etiology including ongoing corticosteroid use as well as PAF and CHF.   *  CXR showed mild bibasilar atelectasis/scar, no pleural effusion, normal pulmonary vascularity. Given bumetanide and chlorothiazide IV dose and started on bumetanide gtt on admit.       -Diuresis and adjustment of medications including metoprolol and losartan has been challenging due to episodes of hypotension and ROBIN, and has remained on diuretic intermittently, bumex infusion and bolus.  -Nephrology and cardiology consulted and following, appreciate assistance.    -Currently on Bumex 0.25 Mg per hour.  Diuril boluses well was given.  -On Aldactone, losartan and sotalol, cardiology managing.  -Follow daily weight and intake and output.  Fluid restriction.  Weight overall has improved but  is still above baseline.  On room air.   -s/p Right heart cath done on 7/31/2024, shows moderately elevated right heart filling pressures.  Normal left heart filling pressure.  Wedge pressure 15 mmHg.  -daily BMP     Persistent A-fib, felt to be contributing to acute CHF.  Status post DC cardioversion: To normal sinus rhythm  * Follows with Dr. Day. H/o multiple antiarrhythmics including flecainide which he failed and amiodarone resulted in pneumonitis. H/o multiple ablations and cardioversion which have subsequently failed.   * Cardiology and EP evaluated patient.   - s/p DC cardioversion to normal sinus rhythm.  With sinus bradycardia initially without any pauses.   -PTA metoprolol changed to sotalol, had prolonged QT, dosage adjusted per EP  -Remained in normal sinus rhythm, have nocturnal bradycardia and pauses.  Continuing with daily.  -Continuing with Xarelto     Recent pneumonitis, suspect from relapse of inflammatory lung process (h/o pulmonary toxicity from amiodarone versus other inflammatory process), on steroids.  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 Follow-up CT 3/2024 showed bilateral interstitial infiltrates with likely component of fibrosis. Recent hospitalization 5/2024 with issues including relapsed inflammatory lung process, started on high dose steroids with slow taper by Pulmonology.  * Initial presentation as above. CXR showed mild bibasilar atelectasis/scar, no pleural effusion, normal pulmonary vascularity. O2 sats 90's on RA on admit.  - Continue prednisone 10 mg daily through 8/8/2024; then 5 mg daily starting 8/9/2024 for 60 days.  -Bactrim prophylaxis no longer needed  - Follow-up with Pulmonology (pending PFTs and repeat CT for 8/2024).    - Has significant reaction with Bactrim with sotalol both can cause increase in QTc prolongation.        Exertional dyspnea, suspect multifactorial including CHF, recent pneumonitis.  - Continue to treat  "other issues as noted.     BOWEN with variable compliance secondary to frequent nocturia.  - Continue CPAP with 3L O2 while sleeping.     ROBIN on CKD likely stage III.  * Baseline creatinine 1.5.  * Cr 1.56 on admit. Started on diuresis as above. Has fluctuated, up to 2.3   - Cr overall has improved, and now on diuresis as above  - Avoid nephrotoxic medications.   - Appreciate nephrologist following and managing diuretic, losartan and Aldactone     Hypokalemia and hypomagnesemia.  *On scheduled potassium, monitor closely given CKD and also on other potassium sparing agents.     Alcohol use disorder with intermittent spells of sobriety.  * On admit, noted that pt had a few seltzer drinks the past week PTA.  - Cessation encouraged on admit.     Bell's palsy with asymmetric smile.  - Noted.     Lung nodules  * Follows with outpatient Pulmonology.  - Follow-up outpatient.     BPH.  - Continue PTA finasteride.     RLE wound.  * Noted on admit. WOC RN consulted.  - Continue local wound cares per WOC RN.     Severe obesity.  * Body mass index is 45.34 kg/m .  - Needs to continue to pursue aggressive dietary and lifestyle modifications.             Diet: Fluid restriction 2000 ML FLUID  Advance Diet as Tolerated: Regular Diet Adult; Moderate Consistent Carb (60 g CHO per Meal) Diet; 2 gm NA Diet; Low Saturated Fat Diet    DVT Prophylaxis: DOAC  Palmer Catheter: Not present  Lines: None     Cardiac Monitoring: ACTIVE order. Indication: Post- EP procedure (48 hours)  Code Status: Full Code      Clinically Significant Risk Factors                  # Hypertension: Noted on problem list  # Acute heart failure with preserved ejection fraction: heart failure noted on problem list, last echo with EF >50%, and receiving IV diuretics           # Severe Obesity: Estimated body mass index is 45.46 kg/m  as calculated from the following:    Height as of an earlier encounter on 7/25/24: 1.727 m (5' 8\").    Weight as of this encounter: 135.6 " kg (299 lb).      # Financial/Environmental Concerns: none         Disposition Plan     Medically Ready for Discharge: Anticipated in 5+ Days once adequately diuresed             Gloria Brar MD  Hospitalist Service  [facility]  Securely message with [employer] (more info)  Text page via [facility] Paging/Directory   ______________________________________________________________________    Interval History     Chart reviewed extensively, discussed with nursing staff and patient was seen this morning.  Sitting up in the chair for breakfast.  Patient reports overall dyspnea has improved, he is ambulating from bed to the bathroom and also walked in the hallway short distance with therapy.  Denies chest pain or dizziness.    Physical Exam   Vital Signs: Temp: 97.7  F (36.5  C) Temp src: Oral BP: 119/71 Pulse: 54   Resp: 18 SpO2: 93 % O2 Device: None (Room air)    Weight: 299 lbs 0 oz    General: AAOx3, calm and pleasant, appears comfortable.  HEENT: PERRLA EOMI. Mucosa moist.   Lungs: Bilateral equal air entry. Clear to auscultation, normal work of breathing.   CVS: S1S2 regular, no tachycardia or murmur.   Abdomen: Soft, NT, ND. BS heard.  MSK: Bilateral leg edema, lymphedema wraps in place.  Neuro: AAOX3. CN 2-12 normal. Strength symmetrical.  Skin: No rash.       Medical Decision Making       45 MINUTES SPENT BY ME on the date of service doing chart review, history, exam, documentation & further activities per the note.      Data     I have personally reviewed the following data over the past 24 hrs:    N/A  \   N/A   / N/A     140 99 28.2 (H) /  96   3.9 33 (H) 1.20 (H) \       Imaging results reviewed over the past 24 hrs:   No results found for this or any previous visit (from the past 24 hour(s)).

## 2024-08-07 NOTE — PLAN OF CARE
August 7, 2024  Shift: [id number]   Mike Schultz  73 year old  YOB: 1950    Reason for admission:heart failure;Acute decompensated heart failure (H)    Neuros: A&Ox4  VS: VSS on RA  Cardiac/Tele: WDL ex tom at times, SB w/ 1*AVB and prolonged QT  GI/: Continent of B/B  Pulmonary: WDL  Pain: Denies  Drains/Lines: L PIV Bumex @ 0.25 mg/hr  Skin: scattered bruising, karine BLE, abrasion on R shin - next wound care 8/9, and BLE edema  Activity: Independent  Diet: Mod carb, 2 gm NA, low fat, and 2 L FR  Therapies recs:   Discharge: Pt's weight needs to be closer to baseline with diuresis.     Shift summary: Pt given dose of Diuril x1 per MAR for diuresis. New IV placed this shift.

## 2024-08-07 NOTE — PROGRESS NOTES
[facility]    ~Cardiology Progress Note~    Primary Cardiologist: Dr. Day     Date of Admission: 7/25/2024  Service Date: 08/07/2024    Summary:  Mr. Mike Schultz is a very pleasant 73 year old male with a past medical history of persistent atrial fibrillation, HFpEF, moderate aortic stenosis, hypertension, BOWEN, alcohol use disorder, Bell's palsy, lung nodules who was admitted from the EP clinic after no response after infusion clinic on 7/24/2024.     Interval August 7, 2024:  Normotensive. Net -1.0 within 24 hours. Electrolytes stable. Creatinine 1.20 today. Spouse at bedside. Denies chest pain, lightheadedness, and palpitations.    Telemetry reviewed- sinus rhythm          Assessment:     Acute on chronic diastolic heart failure, likely triggered by atrial fibrillation   -LVEF: 55% noted on echo 5/2024  -Fluid Status: hypervolemic, discharge weight from previous admission in 5/2024 was 278#  -Diuretic Regimen: PTA torsemide 80 mg daily   -Ischemic Eval: lexiscan stress test 5/2024 noted below   -GDMT PTA:  Beta blocker: metoprolol tartrate 12.5 mg BID   ACEI/ARB/ARNI: losartan 50 mg daily   Aldactone antagonist: spironolactone 25 mg daily   SGLT2 inhibitor: none      Persistent atrial fibrillation with multiple attempts at rhythm control, cardioversions, and ablation attempts  [id number] Score 4 (age, CHF, HTN, aortic plaque)  Anticoagulated with Xarelto   S/p DCCV on 8/1/2024 with restoration of SR, on sotalol     Moderate-severe aortic stenosis, mean gradient 31 mmHg, echo 8/5     Ascending aorta dilatation, measuring 4.0 cm, echo 8/5    History of abnormal nuclear stress test during admission 5/2024  Lexiscan stress test 5/2024- abnormal, small area of ischemia in the apical segments of the left ventricle, small area of nontransmural infarction in the basal inferolateral segments of the left ventricle associated with a mild degree of iva-infarct ischemia involving basal to  mid inferior wall   This was thought to possibly be a false positive finding due to body habitus, but recommendation was a left and right heart cath when patients pulmonary status returned to baseline     HTN   BOWEN, compliant with CPAP   Myocardial scar noted on cardiac MRI   History of alcohol dependence            Plan:     Continue the following cardiology medication regimen   Bumex gtt at 0.25 mg/hr  Losartan 50 mg daily   Xarelto 20 mg daily   Spironolactone 25 mg daily   Daily standing weight, strict I/O, daily BMP and repletion of electrolytes   Recommend coronary angiogram in the outpatient setting   Cardiology to follow       Plan of care was formulated under the direction and guidance of Dr. Pérez.         Genet Desouza PA-C  Physician Assistant   [facility]  Pager: [phone]      25 minutes spent in coordination of care, and discussion with the patient and/or family regarding diagnostic results, prognosis, symptom management, risks and benefits of management options, and development of the plan of care of above.        Patient Active Problem List   Diagnosis    Essential hypertension    Impotence of organic origin    Allergic rhinitis    Herpes simplex virus (HSV) infection    Class 3 obesity (H)    Tobacco use disorder, moderate, in sustained remission    Atrial fibrillation, permanent (H)    Family history of ischemic heart disease    Gout    BOWEN (obstructive sleep apnea)    Ascending aorta dilatation (H24)    Impaired fasting glucose    Aortic valve stenosis    Venous insufficiency    Bilateral carpal tunnel syndrome    Other cardiomyopathies (H)    Leg wound, right    Lower extremity edema    Acute respiratory failure with hypoxia (H)    Acute on chronic congestive heart failure, unspecified heart failure type (H)    Acute decompensated heart failure (H)       Physical Exam   Temp: 97.5  F (36.4  C) Temp src: Oral BP: 120/75 Pulse: 53   Resp: 18 SpO2: 94 % O2 Device: None (Room  air)    Vitals:    08/05/24 0615 08/06/24 0600 08/07/24 0616   Weight: 135.4 kg (298 lb 6.4 oz) 135.3 kg (298 lb 3.2 oz) 135.6 kg (299 lb)     I/O last 3 completed shifts:  In: 1440 [P.O.:1440]  Out: 2100 [Urine:2100]    Constitutional: Appears stated age, well nourished, NAD.  Neck: Supple.  JVD not visualized.   Respiratory: Non-labored. Lungs CTAB.  Cardiovascular: RRR, with holosystolic murmur. Bilateral lower extremities with lymphedema.   GI: Soft, non-distended, non-tender.  Skin: Warm and dry.   Musculoskeletal/Extremities: Symmetrical movement. Lymphedema wear to BLE.  Neurologic: No gross focal deficits. Alert, awake.  Psychiatric: Affect appropriate. Mentation normal.    Medications   Current Facility-Administered Medications   Medication Dose Route Frequency Provider Last Rate Last Admin    - MEDICATION INSTRUCTIONS -   Does not apply DOES NOT GO TO Courtney Blackmon APRN CNP        bumetanide (BUMEX) 0.25 mg/mL infusion  0.25 mg/hr Intravenous Continuous Geent Desouza PA-C 1 mL/hr at 08/06/24 1930 0.25 mg/hr at 08/06/24 1930    Continuing ACE inhibitor/ARB/ARNI from home medication list OR ACE inhibitor/ARB/ARNI order already placed during this visit   Does not apply DOES NOT GO TO Courtney Blackmon APRN CNP        Continuing beta blocker from home medication list OR beta blocker order already placed during this visit   Does not apply DOES NOT GO TO Courtney Blackmon APRN CNP        Give 1/2 of usual dose of LONG ACTING insulin AM of procedure IF diabetic   Does not apply Continuous PRShanthi Peters APRN CNP        May take oral meds with sip of water, the morning of Cardioversion procedure.   Does not apply Continuous PRN Shanthi Wilkins APRN CNP        Patient is already receiving anticoagulation with heparin, enoxaparin (LOVENOX), warfarin (COUMADIN)  or other anticoagulant medication   Does not apply Continuous Courtney Stewart APRN CNP         Current Facility-Administered  Medications   Medication Dose Route Frequency Provider Last Rate Last Admin    allopurinol (ZYLOPRIM) tablet 300 mg  300 mg Oral Daily Courtney Royy, APRN CNP   300 mg at 08/06/24 0936    cetirizine (zyrTEC) tablet 10 mg  10 mg Oral QPM Courtney Royy, APRN CNP   10 mg at 08/06/24 2109    finasteride (PROPECIA) tablet 1 mg  1 mg Oral Daily SreedharCourtney xiong APRN CNP   1 mg at 08/06/24 0936    losartan (COZAAR) tablet 50 mg  50 mg Oral Daily Trevin Zuleta MD   50 mg at 08/06/24 0936    miconazole (MICATIN) 2 % powder   Topical BID Masha Moy MD   Given at 08/06/24 0939    polyethylene glycol (MIRALAX) Packet 17 g  17 g Oral Daily SreedharCourtney xiong, APRN CNP   17 g at 08/06/24 0936    potassium chloride pushpa ER (KLOR-CON M20) CR tablet 40 mEq  40 mEq Oral TID Yasir Pérez MD   40 mEq at 08/06/24 2109    predniSONE (DELTASONE) tablet 10 mg  10 mg Oral Daily SreedharCourtney xiong Rebeka, APRN CNP   10 mg at 08/06/24 0936    Followed by    [START ON 8/9/2024] predniSONE (DELTASONE) tablet 5 mg  5 mg Oral Daily SreedharCourtney xiong, APRN CNP        psyllium (METAMUCIL/KONSYL) capsule 1-2 capsule  1-2 capsule Oral Daily Courtney Roy, APRN CNP   2 capsule at 08/06/24 0936    rivaroxaban ANTICOAGULANT (XARELTO) tablet 20 mg  20 mg Oral Daily with supper SreedharCourtney xiong, APRN CNP   20 mg at 08/06/24 1736    sodium chloride (PF) 0.9% PF flush 3 mL  3 mL Intracatheter Q8H Courtney Roy, APRN CNP   3 mL at 08/05/24 2239    sotalol (BETAPACE) tablet 80 mg  80 mg Oral Q12H ONEAL (08/20) Natalie Campos PA-C   80 mg at 08/06/24 2325    spironolactone (ALDACTONE) tablet 25 mg  25 mg Oral Daily Narendra Thomas MD   25 mg at 08/06/24 0936       Data   Last 24 hours labs personally reviewed.  Echo:   Recent Results (from the past 4320 hour(s))   Echocardiogram Complete   Result Value    LVEF  55-60%    Narrative    [id number]  [id number]  [id number]  [id number]^REINA^TREVIN^TRAVIS     [facility]  [facility]  Echocardiography Laboratory  [address] [place] [address]     Name: TOO HOWARD  MRN: [id number]  : 1950  Study Date: 2024 10:15 AM  Age: 73 yrs  Gender: Male  Patient Location: [facility]  Reason For Study: Aortic Valve Disorder  Ordering Physician: LAURA HUANG  Referring Physician: Ranjan Courtney,  Performed By: Norah Suarez     BSA: 2.4 m2  Height: 68 in  Weight: 298 lb  HR: 66  BP: 122/76 mmHg  ______________________________________________________________________________  Procedure  Complete Portable Echo Adult. Optison (NDC #[id number]) given intravenously.  ______________________________________________________________________________  Interpretation Summary     Left ventricular systolic function is normal.  The visual ejection fraction is 55-60%.  There is moderate concentric left ventricular hypertrophy.  The right ventricle is normal in structure, function and size.  Regional wall motion abnormalities cannot be excluded due to limited  visualization despite use of contrast.  Calcified aortic valve. Vmax 3.5 m/sec, mean gradient 31 mmHg, MARILYN 0.9 cm2 by  continuity equation. DVI 0.22. Findings consistent with moderate-severe aortic  stenosis. Doppler interrogation is at a borderline reduced stroke volume index  (38 ml/m2).  Dilation of the inferior vena cava is present with abnormal respiratory  variation in diameter.  There is no pericardial effusion.  Ascending aorta dilatation is present (4.0 cm).     Compared to study dated 2024, there is slight progression of aortic  stenosis however interstudy variabilty in Doppler interrogation is possible  given poor acoustic windows. Otherwise findings are similar.  ______________________________________________________________________________  Left Ventricle  The left ventricle is normal in size. There is moderate concentric left  ventricular hypertrophy. Left ventricular systolic function is normal.  The  visual ejection fraction is 55-60%. Left ventricular diastolic function is  indeterminate. Regional wall motion abnormalities cannot be excluded due to  limited visualization.     Right Ventricle  The right ventricle is normal in structure, function and size.     Atria  The left atrium is mildly dilated. Right atrial size is normal.     Mitral Valve  There is mild to moderate mitral annular calcification. There is mild (1+)  mitral regurgitation.     Tricuspid Valve  The tricuspid valve is not well visualized. Right ventricular systolic  pressure could not be approximated due to inadequate tricuspid regurgitation.     Aortic Valve  The aortic valve is not well visualized. Moderate valvular aortic stenosis.  Vmax 3.5 m/sec, mean gradient 31 mmHg, MARILYN 0.9 cm2 by continuity equation. DVI  0.22. Findings consistent with moderate-severe aortic stenosis. Doppler  interrogation is at a borderline reduced stroke volume index (38 ml/m2).     Pulmonic Valve  The pulmonic valve is normal in structure and function. There is trace  pulmonic valvular regurgitation.     Vessels  The aortic root is normal size. Ascending aorta dilatation is present.  Dilation of the inferior vena cava is present with abnormal respiratory  variation in diameter.     Pericardium  There is no pericardial effusion.     ______________________________________________________________________________  MMode/2D Measurements & Calculations  IVSd: 1.3 cm  LVIDd: 6.2 cm  LVIDs: 4.6 cm  LVPWd: 1.5 cm  FS: 25.3 %  LV mass(C)d: 398.2 grams  LV mass(C)dI: 164.5 grams/m2     Ao root diam: 3.6 cm  LA dimension: 4.7 cm  asc Aorta Diam: 4.0 cm  LA/Ao: 1.3  LVOT diam: 2.5 cm  LVOT area: 4.8 cm2  Ao root diam index Ht(cm/m): 2.1  Ao root diam index BSA (cm/m2): 1.5  Asc Ao diam index BSA (cm/m2): 1.6  Asc Ao diam index Ht(cm/m): 2.3  LA Volume (BP): 93.3 ml  LA Volume Index (BP): 38.6 ml/m2  RWT: 0.47     TAPSE: 3.3 cm     Doppler Measurements & Calculations  MV E  max seamus: 86.5 cm/sec  MV A max seamus: 48.8 cm/sec  MV E/A: 1.8  MV dec slope: 455.2 cm/sec2  MV dec time: 0.19 sec  Ao V2 max: 354.4 cm/sec  Ao max P.0 mmHg  Ao V2 mean: 267.2 cm/sec  Ao mean P.5 mmHg  Ao V2 VTI: 97.8 cm  MARILYN(I,D): 0.95 cm2  MARILYN(V,D): 1.1 cm2  LV V1 max P.5 mmHg  LV V1 max: 79.3 cm/sec  LV V1 VTI: 19.5 cm  SV(LVOT): 93.4 ml  SI(LVOT): 38.6 ml/m2  PA acc time: 0.15 sec  AV Seamus Ratio (DI): 0.22  MARILYN Index (cm2/m2): 0.39  E/E' av.9  Lateral E/e': 8.8  Medial E/e': 18.9  RV S Seamus: 13.9 cm/sec     ______________________________________________________________________________  Report approved by: Laurie Pereira 2024 12:54 PM         Echocardiogram Complete   Result Value    LVEF  55%    [facility]    [id number]  [id number]  [id number]^GAMALIEL^JOAN^KIYA     [facility]  Echocardiography Laboratory  [address] [place] [address]     Name: TOO HOWARD  MRN: [id number]  : 1950  Study Date: 2024 11:48 AM  Age: 73 yrs  Gender: Male  Patient Location: [facility]  Reason For Study: Heart Failure  Ordering Physician: JOAN ALSTON  Referring Physician: Ranjan Courtney  Performed By: Norah Suarez     BSA: 2.4 m2  Height: 68 in  Weight: 291 lb  HR: 75  BP: 110/80 mmHg  ______________________________________________________________________________  Procedure  Complete Portable Echo Adult.  ______________________________________________________________________________  Interpretation Summary     Technically challenging study due to patient body habitus.     The left ventricle is borderline dilated. There is mild concentric left  ventricular hypertrophy.  Left ventricular systolic function is low normal. The visual ejection fraction  is estimated at 55%. Regional wall motion challenging to assess due to  frequent ectopy/irregular R-R intervals however no obvious regional wall  motion abnormalities seen.  The right ventricle is borderline  dilated. The right ventricular systolic  function is normal.  Moderate aortic stenosis, Vmax 3.7 m/s, mean gradient 35 mmHg, MARILYN 1.1cm2, DI  0.26. SVi 44 cc/m2. There is mild (1+) aortic regurgitation. Aortic valve  morphology is not well visualized, however it is heavily calcified.  There is no pericardial effusion.  The inferior vena cava was normal in size with preserved respiratory  variability.     This study was compared to a TTE from 2/11/2024. Global LV systolic function  is marginally less dynamic on this present study.  ______________________________________________________________________________  Left Ventricle  The left ventricle is borderline dilated. There is mild concentric left  ventricular hypertrophy. Left ventricular systolic function is low normal. The  visual ejection fraction is estimated at 55%. Grade III or advanced diastolic  dysfunction. Regional wall motion challenging to assess due to frequent  ectopy/irregular R-R intervals however no obvious regional wall motion  abnormalities seen.     Right Ventricle  The right ventricle is borderline dilated. The right ventricular systolic  function is normal.     Atria  The left atrium is moderately dilated. Right atrial size is normal.     Mitral Valve  There is mild mitral annular calcification. There is mild (1+) mitral  regurgitation.     Tricuspid Valve  The tricuspid valve is not well visualized, but is grossly normal.     Aortic Valve  Aortic valve morphology is not well visualized, however it is heavily  calcified. There is mild (1+) aortic regurgitation. Moderate aortic stenosis,  Vmax 3.7 m/s, mean gradient 35 mmHg, MARILYN 1.1cm2, DI 0.26. SVi 44 cc/m2.     Pulmonic Valve  The pulmonic valve is not well seen, but is grossly normal.     Vessels  The aortic root is normal size. Ascending aorta dilatation is present. The  inferior vena cava was normal in size with preserved respiratory variability.     Pericardium  There is no pericardial  effusion.     Rhythm  The rhythm was undetermined.  ______________________________________________________________________________  MMode/2D Measurements & Calculations  IVSd: 1.1 cm     LVIDd: 5.9 cm  LVIDs: 3.9 cm  LVPWd: 1.2 cm  FS: 34.8 %  LV mass(C)d: 300.5 grams  LV mass(C)dI: 125.3 grams/m2  Ao root diam: 3.5 cm  LA dimension: 4.8 cm  asc Aorta Diam: 4.0 cm  LA/Ao: 1.3  LVOT diam: 2.4 cm  LVOT area: 4.5 cm2  Ao root diam index Ht(cm/m): 2.1  Ao root diam index BSA (cm/m2): 1.5  Asc Ao diam index BSA (cm/m2): 1.7  Asc Ao diam index Ht(cm/m): 2.3  LA Volume (BP): 111.0 ml     LA Volume Index (BP): 46.3 ml/m2  RWT: 0.42  TAPSE: 1.7 cm     Doppler Measurements & Calculations  MV E max seamus: 115.0 cm/sec  MV A max seamus: 50.9 cm/sec  MV E/A: 2.3  MV dec slope: 682.9 cm/sec2  MV dec time: 0.17 sec  Ao V2 max: 365.0 cm/sec  Ao max P.3 mmHg  Ao V2 mean: 284.5 cm/sec  Ao mean P.5 mmHg  Ao V2 VTI: 93.2 cm  MARILYN(I,D): 1.1 cm2  MARILYN(V,D): 1.2 cm2  AI P1/2t: 415.1 msec  LV V1 max PG: 3.6 mmHg  LV V1 max: 95.2 cm/sec  LV V1 VTI: 23.2 cm  SV(LVOT): 105.1 ml  SI(LVOT): 43.8 ml/m2  PA acc time: 0.08 sec  AV Seamus Ratio (DI): 0.26  MARILYN Index (cm2/m2): 0.47  E/E' av.4     Lateral E/e': 21.2  Medial E/e': 19.5  RV S Seamus: 10.1 cm/sec     ______________________________________________________________________________  Report approved by: Laurie Baires 2024 03:36 PM         Echocardiogram Complete   Result Value    LVEF  55-60%    Narrative    [id number]  [id number]  [id number]^DENNISE^EDITA^MIR     [facility]  Echocardiography Laboratory  [address] [place], [place] [id number]     Name: TOO HOWRAD  MRN: [id number]  : 1950  Study Date: 2024 09:42 AM  Age: 73 yrs  Gender: Male  Patient Location: [facility]  Reason For Study: Heart Failure  Ordering Physician: EDITA BOWDEN  Performed By: Millicent Rosenberg     BSA: 2.4 m2  Height: 68 in  Weight: 295 lb  HR: 92  BP: 130/75  mmHg  ______________________________________________________________________________  Procedure  Complete Portable Echo Adult. Optison (NDC #[id number]) given intravenously.  ______________________________________________________________________________  Interpretation Summary     The visual ejection fraction is 55-60%.  There is moderate concentric left ventricular hypertrophy.  There is mild (1+) mitral regurgitation.  Moderate valvular aortic stenosis.  Ascending aorta dilatation is present.  The rhythm was atrial fibrillation.  The patient exhibited frequent PVCs.  There is moderate biatrial enlargement.  The study was technically difficult. Contrast was used without apparent  complications. Compared to prior study, changes are noted.  ______________________________________________________________________________  Left Ventricle  The left ventricle is normal in size. There is moderate concentric left  ventricular hypertrophy. The visual ejection fraction is 55-60%. Left  ventricular diastolic function is abnormal.     Right Ventricle  The right ventricle is normal in structure, function and size.     Atria  There is moderate biatrial enlargement.     Mitral Valve  There is mild mitral annular calcification. The mitral valve leaflets are  mildly thickened. There is mild (1+) mitral regurgitation.     Tricuspid Valve  The tricuspid valve is not well visualized, but is grossly normal.     Aortic Valve  Moderate valvular aortic stenosis.     Pulmonic Valve  The pulmonic valve is not well visualized.     Vessels  The aortic root is normal size. Ascending aorta dilatation is present.     Pericardium  There is no pericardial effusion.     Rhythm  The rhythm was atrial fibrillation. The patient exhibited frequent PVCs.  ______________________________________________________________________________  MMode/2D Measurements & Calculations     IVSd: 1.4 cm  LVIDd: 5.4 cm  LVIDs: 4.3 cm  LVPWd: 1.4 cm  FS: 20.9 %  LV mass(C)d:  327.0 grams  LV mass(C)dI: 135.6 grams/m2  Ao root diam: 3.7 cm  LA dimension: 5.3 cm  asc Aorta Diam: 4.0 cm  LA/Ao: 1.4  LVOT diam: 2.4 cm  LVOT area: 4.4 cm2  Ao root diam index Ht(cm/m): 2.1  Ao root diam index BSA (cm/m2): 1.5  Asc Ao diam index BSA (cm/m2): 1.7  Asc Ao diam index Ht(cm/m): 2.3  LA Volume (BP): 72.2 ml  RV Base: 4.1 cm  RWT: 0.52  TAPSE: 2.4 cm     Doppler Measurements & Calculations  MV E max seamus: 138.4 cm/sec  MV dec slope: 814.1 cm/sec2  MV dec time: 0.17 sec  Ao V2 max: 364.4 cm/sec  Ao max P.0 mmHg  Ao V2 mean: 278.0 cm/sec  Ao mean P.9 mmHg  Ao V2 VTI: 86.1 cm  MARILYN(I,D): 1.0 cm2  MARILYN(V,D): 1.1 cm2  LV V1 max PG: 3.2 mmHg  LV V1 max: 90.0 cm/sec  LV V1 VTI: 20.1 cm  SV(LVOT): 88.7 ml  SI(LVOT): 36.8 ml/m2  PA acc time: 0.09 sec  AV Seamus Ratio (DI): 0.25  MARILYN Index (cm2/m2): 0.43  E/E' av.4  Lateral E/e': 16.8  Medial E/e': 16.0  RV S Seamus: 15.6 cm/sec     ______________________________________________________________________________  Report approved by: Laurie Garcia 2024 01:05 PM

## 2024-08-07 NOTE — PLAN OF CARE
Goal Outcome Evaluation:    Pt HR dropped from 70s to 51 after sotolol dose. Tele : Sinus tom /75 (BP Location: Left arm)   Pulse 53   Temp 97.5  F (36.4  C) (Oral)   Resp 18   Wt 135.3 kg (298 lb 3.2 oz)   SpO2 94%   BMI 45.34 kg/m       On cpap. Denies pain. Call light within reach    EKG ordered for daily AM for sotolol dose.  Leonila Lemon RN

## 2024-08-07 NOTE — PROGRESS NOTES
CLINICAL NUTRITION SERVICES - BRIEF NOTE    Nutrition risks review for length of stay. 100% intakes documented per nursing flowsheets, ordering TID meals per healthtouch. Wt trending down slightly this admit however suspect d/t fluid as pt has been on bumex gtt. Does have trauma wound after hitting his shin with his car door, however no pressure component. No nutrition interventions indicated at this time.    MONITORING AND EVALUATION:  Will continue to follow every 7-10 days per protocol    Ally Rodriguez RD, LD  Clinical Dietitian - [facility]

## 2024-08-08 ENCOUNTER — APPOINTMENT (OUTPATIENT)
Dept: OCCUPATIONAL THERAPY | Facility: CLINIC | Age: 74
DRG: 286 | End: 2024-08-08
Attending: STUDENT IN AN ORGANIZED HEALTH CARE EDUCATION/TRAINING PROGRAM
Payer: COMMERCIAL

## 2024-08-08 LAB
ANION GAP SERPL CALCULATED.3IONS-SCNC: 10 MMOL/L (ref 7–15)
ATRIAL RATE - MUSE: 59 BPM
BUN SERPL-MCNC: 27.7 MG/DL (ref 8–23)
CALCIUM SERPL-MCNC: 9 MG/DL (ref 8.8–10.4)
CHLORIDE SERPL-SCNC: 96 MMOL/L (ref 98–107)
CREAT SERPL-MCNC: 1.22 MG/DL (ref 0.67–1.17)
DIASTOLIC BLOOD PRESSURE - MUSE: NORMAL MMHG
EGFRCR SERPLBLD CKD-EPI 2021: 63 ML/MIN/1.73M2
GLUCOSE SERPL-MCNC: 98 MG/DL (ref 70–99)
HCO3 SERPL-SCNC: 33 MMOL/L (ref 22–29)
INTERPRETATION ECG - MUSE: NORMAL
P AXIS - MUSE: 89 DEGREES
POTASSIUM SERPL-SCNC: 3.2 MMOL/L (ref 3.4–5.3)
POTASSIUM SERPL-SCNC: 4.1 MMOL/L (ref 3.4–5.3)
PR INTERVAL - MUSE: 200 MS
QRS DURATION - MUSE: 90 MS
QT - MUSE: 536 MS
QTC - MUSE: 530 MS
R AXIS - MUSE: 39 DEGREES
SODIUM SERPL-SCNC: 139 MMOL/L (ref 135–145)
SYSTOLIC BLOOD PRESSURE - MUSE: NORMAL MMHG
T AXIS - MUSE: 94 DEGREES
VENTRICULAR RATE- MUSE: 59 BPM

## 2024-08-08 PROCEDURE — 97110 THERAPEUTIC EXERCISES: CPT | Mod: GO

## 2024-08-08 PROCEDURE — 250N000011 HC RX IP 250 OP 636: Performed by: INTERNAL MEDICINE

## 2024-08-08 PROCEDURE — 99233 SBSQ HOSP IP/OBS HIGH 50: CPT | Mod: FS

## 2024-08-08 PROCEDURE — 93010 ELECTROCARDIOGRAM REPORT: CPT | Performed by: INTERNAL MEDICINE

## 2024-08-08 PROCEDURE — 99232 SBSQ HOSP IP/OBS MODERATE 35: CPT | Performed by: HOSPITALIST

## 2024-08-08 PROCEDURE — 250N000013 HC RX MED GY IP 250 OP 250 PS 637: Performed by: HOSPITALIST

## 2024-08-08 PROCEDURE — 36415 COLL VENOUS BLD VENIPUNCTURE: CPT | Performed by: HOSPITALIST

## 2024-08-08 PROCEDURE — 250N000013 HC RX MED GY IP 250 OP 250 PS 637: Performed by: NURSE PRACTITIONER

## 2024-08-08 PROCEDURE — 250N000013 HC RX MED GY IP 250 OP 250 PS 637: Performed by: INTERNAL MEDICINE

## 2024-08-08 PROCEDURE — 80048 BASIC METABOLIC PNL TOTAL CA: CPT | Performed by: HOSPITALIST

## 2024-08-08 PROCEDURE — G0463 HOSPITAL OUTPT CLINIC VISIT: HCPCS

## 2024-08-08 PROCEDURE — 250N000012 HC RX MED GY IP 250 OP 636 PS 637: Performed by: NURSE PRACTITIONER

## 2024-08-08 PROCEDURE — 99232 SBSQ HOSP IP/OBS MODERATE 35: CPT | Performed by: INTERNAL MEDICINE

## 2024-08-08 PROCEDURE — 93005 ELECTROCARDIOGRAM TRACING: CPT

## 2024-08-08 PROCEDURE — 250N000011 HC RX IP 250 OP 636

## 2024-08-08 PROCEDURE — 210N000002 HC R&B HEART CARE

## 2024-08-08 PROCEDURE — 250N000013 HC RX MED GY IP 250 OP 250 PS 637: Performed by: PHYSICIAN ASSISTANT

## 2024-08-08 PROCEDURE — 84132 ASSAY OF SERUM POTASSIUM: CPT | Performed by: HOSPITALIST

## 2024-08-08 RX ORDER — POTASSIUM CHLORIDE 1500 MG/1
80 TABLET, EXTENDED RELEASE ORAL 3 TIMES DAILY
Status: DISCONTINUED | OUTPATIENT
Start: 2024-08-08 | End: 2024-08-09

## 2024-08-08 RX ORDER — BUMETANIDE 0.25 MG/ML
2 INJECTION INTRAMUSCULAR; INTRAVENOUS ONCE
Status: COMPLETED | OUTPATIENT
Start: 2024-08-08 | End: 2024-08-08

## 2024-08-08 RX ORDER — POTASSIUM CHLORIDE 1500 MG/1
40 TABLET, EXTENDED RELEASE ORAL ONCE
Status: COMPLETED | OUTPATIENT
Start: 2024-08-08 | End: 2024-08-08

## 2024-08-08 RX ORDER — BUMETANIDE 0.25 MG/ML
1 INJECTION INTRAMUSCULAR; INTRAVENOUS ONCE
Status: COMPLETED | OUTPATIENT
Start: 2024-08-08 | End: 2024-08-08

## 2024-08-08 RX ADMIN — SPIRONOLACTONE 25 MG: 25 TABLET ORAL at 07:51

## 2024-08-08 RX ADMIN — CETIRIZINE HYDROCHLORIDE 10 MG: 10 TABLET, FILM COATED ORAL at 20:38

## 2024-08-08 RX ADMIN — ALLOPURINOL 300 MG: 300 TABLET ORAL at 07:51

## 2024-08-08 RX ADMIN — BUMETANIDE 2 MG: 0.25 INJECTION INTRAMUSCULAR; INTRAVENOUS at 09:21

## 2024-08-08 RX ADMIN — SOTALOL HYDROCHLORIDE 80 MG: 80 TABLET ORAL at 09:16

## 2024-08-08 RX ADMIN — BUMETANIDE 1 MG: 0.25 INJECTION INTRAMUSCULAR; INTRAVENOUS at 16:50

## 2024-08-08 RX ADMIN — POTASSIUM CHLORIDE 40 MEQ: 1500 TABLET, EXTENDED RELEASE ORAL at 07:51

## 2024-08-08 RX ADMIN — POTASSIUM CHLORIDE 40 MEQ: 1500 TABLET, EXTENDED RELEASE ORAL at 09:16

## 2024-08-08 RX ADMIN — MICONAZOLE NITRATE: 2 POWDER TOPICAL at 20:39

## 2024-08-08 RX ADMIN — PREDNISONE 10 MG: 10 TABLET ORAL at 07:51

## 2024-08-08 RX ADMIN — POLYETHYLENE GLYCOL 3350 17 G: 17 POWDER, FOR SOLUTION ORAL at 07:51

## 2024-08-08 RX ADMIN — SOTALOL HYDROCHLORIDE 80 MG: 80 TABLET ORAL at 22:11

## 2024-08-08 RX ADMIN — POTASSIUM CHLORIDE 80 MEQ: 1500 TABLET, EXTENDED RELEASE ORAL at 22:11

## 2024-08-08 RX ADMIN — POTASSIUM CHLORIDE 80 MEQ: 1500 TABLET, EXTENDED RELEASE ORAL at 16:47

## 2024-08-08 RX ADMIN — FINASTERIDE 1 MG: 1 TABLET, FILM COATED ORAL at 07:51

## 2024-08-08 RX ADMIN — Medication 1 CAPSULE: at 07:51

## 2024-08-08 RX ADMIN — LOSARTAN POTASSIUM 25 MG: 25 TABLET, FILM COATED ORAL at 07:51

## 2024-08-08 RX ADMIN — RIVAROXABAN 20 MG: 20 TABLET, FILM COATED ORAL at 16:50

## 2024-08-08 ASSESSMENT — ACTIVITIES OF DAILY LIVING (ADL)
ADLS_ACUITY_SCORE: 22

## 2024-08-08 NOTE — PROGRESS NOTES
"[facility]  WO Nurse Inpatient Assessment     Consulted for: RLE    Summary: Small wound to right lateral shin due to trauma and chronic edema, slow improvement but no s/s infection. WO nurse will follow up weekly if patient remains inpatient.     Patient History (according to provider note(s):      \"Mike Schultz is a 73 year old male with a PMH significant for persistent atrial fibrillation, HFpEF, moderate aortic stenosis, hypertension, BOWEN, alcohol use disorder, Bell's palsy, lung nodules who was directly admitted from cardiology EP clinic on 7/25/2024 for ongoing decompensated heart failure symptoms despite aggressive outpatient management with primary drivers of his heart failure being his atrial fibrillation and corticosteroid use.\"    Assessment:      Areas visualized during today's visit: Focused:    Wound location: Right lateral shin    Last photo: 8-8-24      Wound due to: Mixed Etiology: Trauma, chronic edema  Wound history/plan of care: Patient reports he hit his leg with his car door approx 3 weeks PTA. His legs are swollen at baseline and the skin is fragile. He has a resurfaced wound that is distal to current wound. Has worn compression in the past. Per chart review, patient saw Dr. Cason with the [facility] wound clinic back in April of this year and was instructed to wear SpandaGrip. Will continue this for patient as he is familiar with the product.    Wound base:   moist, red and yellow marbling throughout wound bed       Palpation of the wound bed: normal      Drainage: small     Description of drainage: serosanguinous     Measurements (length x width x depth, in cm): 1.8 x 1.6  x  0.1 cm      Tunneling: N/A     Undermining: N/A  Periwound skin: Edematous, Hemosiderin staining, and karine      Color: pink and red      Temperature: normal   Odor: none  Pain: denies , none  Pain interventions prior to dressing change: patient tolerated well and slow and gentle cares "   Treatment goal: Heal , Drainage control, and Protection  STATUS: unchanged  Supplies ordered: discussed with patient       Treatment Plan:     Right lateral shin wound(s): Every other day and PRN for loose/soiled dressing  Cleanse with wound cleanser and gauze. Pat dry.  Cover wound with Polymem (#[id number]):  Cut out 1/4 of the pink foam from a PolyMem strip and place over wound.  Secure with additional Medipore tape or foam dressing.  Initial and date dressing change.  Apply SpandaGrip (size G, #004062) to both legs from base of toes to below the knee.  Ok if pt has other preferred compression garments. Compression can be removed at night and for skin care.  Elevate legs whenever possible.     Orders: Reviewed and Updated    RECOMMEND PRIMARY TEAM ORDER: None, at this time  Education provided: plan of care and wound progress  Discussed plan of care with: Patient  WOC nurse follow-up plan: weekly  Notify WOC if wound(s) deteriorate.  Nursing to notify the Provider(s) and re-consult the WOC Nurse if new skin concern.    DATA:     Current support surface: Standard  Standard Isoflex gel  Containment of urine/stool: Continent of bladder and Continent of bowel  BMI: Body mass index is 44.69 kg/m .   Active diet order: Orders Placed This Encounter      Advance Diet as Tolerated: Regular Diet Adult; Moderate Consistent Carb (60 g CHO per Meal) Diet; 2 gm NA Diet; Low Saturated Fat Diet     Output: I/O last 3 completed shifts:  In: -   Out: 4300 [Urine:4300]     Labs:   Recent Labs   Lab 08/03/24  0616   ALBUMIN 3.6   HGB 12.6*   WBC 10.2     Pressure injury risk assessment:   Sensory Perception: 4-->no impairment  Moisture: 4-->rarely moist  Activity: 3-->walks occasionally  Mobility: 3-->slightly limited  Nutrition: 3-->adequate  Friction and Shear: 3-->no apparent problem  Ru Score: 20    Hina Arango RN CWOCN  -Securely message with [employer] ([facility] [employer])  Preferred  -[facility] Office Phone:  [phone] (messages checked periodically Mon-Fri 8a-4p)

## 2024-08-08 NOTE — PLAN OF CARE
Goal Outcome Evaluation:      Plan of Care Reviewed With: patient    Overall Patient Progress: improvingOverall Patient Progress: improving    Outcome Evaluation: progressing, continue IV diuresis      Orientation: Alert, oriented x4  Vitals/Tele: VSS on RA, sinus rhythm   IV Access/drains: PIV-infusing bumex gtt at 1mL/hr; also got additional bumex push dose 2mg x1 this AM   Pain: Denies  Diet: Cardiac/Mod Carb; FR 2000cc  Mobility: Independent in room  GI/: Urinal at bedside; continent, BM today   Wound/Skin: Skin tear on RLE-dressing CDI changed 8/8 by WOC-RN,  BLE edema +2 improving   Consults: Nephrology, Cards, WOC, EP  Other: K protocol--replaced K+ 3.2 this AM recheck 4.1  Discharge Plan: TBD pending diuresis

## 2024-08-08 NOTE — PROGRESS NOTES
[facility]    ~Cardiology Progress Note~    Primary Cardiologist: Dr. Day     Date of Admission: 7/25/2024  Service Date: 08/08/2024    Summary:  Mr. Mike Schultz is a very pleasant 73 year old male with a past medical history of persistent atrial fibrillation, HFpEF, moderate aortic stenosis, hypertension, BOWEN, alcohol use disorder, Bell's palsy, lung nodules who was admitted from the EP clinic after no response after infusion clinic on 7/24/2024.     Interval August 8, 2024:  Normotensive. Net -4.3 within 24 hours. K 3.2, Creatinine 1.22 today. Responded well to Bumex gtt and one time dose of diuril yesterday. Denies chest pain, lightheadedness, and palpitations.    Telemetry reviewed- sinus rhythm          Assessment:     Acute on chronic diastolic heart failure, likely triggered by atrial fibrillation   -LVEF: 55% noted on echo 5/2024  -Fluid Status: hypervolemic, discharge weight from previous admission in 5/2024 was 278#  -Diuretic Regimen: PTA torsemide 80 mg daily   -Ischemic Eval: lexiscan stress test 5/2024 noted below   -GDMT PTA:  Beta blocker: metoprolol tartrate 12.5 mg BID   ACEI/ARB/ARNI: losartan 50 mg daily   Aldactone antagonist: spironolactone 25 mg daily   SGLT2 inhibitor: none   -s/p RHC on 7/31: showed moderate elevated right heart filling pressures, mean RA 14 mmHg, normal left heart filling pressures, wedge 15 mmHg, mild pulmonary hypertension      Persistent atrial fibrillation with multiple attempts at rhythm control, cardioversions, and ablation attempts  [id number] Score 4 (age, CHF, HTN, aortic plaque)  Anticoagulated with Xarelto   S/p DCCV on 8/1/2024 with restoration of SR, on sotalol     Moderate-severe aortic stenosis, mean gradient 31 mmHg, echo 8/5     Ascending aorta dilatation, measuring 4.0 cm, echo 8/5    History of abnormal nuclear stress test during admission 5/2024  Lexiscan stress test 5/2024- abnormal, small area of ischemia in the  apical segments of the left ventricle, small area of nontransmural infarction in the basal inferolateral segments of the left ventricle associated with a mild degree of iva-infarct ischemia involving basal to mid inferior wall   This was thought to possibly be a false positive finding due to body habitus, but recommendation was a left and right heart cath when patients pulmonary status returned to baseline     HTN   BOWEN, compliant with CPAP   Myocardial scar noted on cardiac MRI   History of alcohol dependence            Plan:     One time dose of Bumex 2 mg IV bolus in addition to Bumx gtt today. May need further bolus, will reassess later today.   Continue Bumex gtt at 0.25 mg/hr   Increase potassium chloride to 80 mEq TID   Continue the following cardiology medication regimen   Losartan 25 mg daily   Xarelto 20 mg daily   Spironolactone 25 mg daily   Daily standing weight, strict I/O, daily BMP and repletion of electrolytes   Recommend coronary angiogram in the outpatient setting   Cardiology to follow       Plan of care was formulated under the direction and guidance of Dr. Pérez.         Genet Desouza PA-C  Physician Assistant   [facility]  Pager: [phone]      25 minutes spent in coordination of care, and discussion with the patient and/or family regarding diagnostic results, prognosis, symptom management, risks and benefits of management options, and development of the plan of care of above.        Patient Active Problem List   Diagnosis    Essential hypertension    Impotence of organic origin    Allergic rhinitis    Herpes simplex virus (HSV) infection    Class 3 obesity (H)    Tobacco use disorder, moderate, in sustained remission    Atrial fibrillation, permanent (H)    Family history of ischemic heart disease    Gout    BOWEN (obstructive sleep apnea)    Ascending aorta dilatation (H24)    Impaired fasting glucose    Aortic valve stenosis    Venous insufficiency    Bilateral carpal  tunnel syndrome    Other cardiomyopathies (H)    Leg wound, right    Lower extremity edema    Acute respiratory failure with hypoxia (H)    Acute on chronic congestive heart failure, unspecified heart failure type (H)    Acute decompensated heart failure (H)       Physical Exam   Temp: 97.3  F (36.3  C) Temp src: Oral BP: (!) 137/103 Pulse: 64   Resp: 18 SpO2: 95 % O2 Device: None (Room air)    Vitals:    08/07/24 1508 08/08/24 0543 08/08/24 0800   Weight: 135.8 kg (299 lb 4.8 oz) 113.4 kg (249 lb 14.4 oz) 133.3 kg (293 lb 14.4 oz)     I/O last 3 completed shifts:  In: -   Out: 4300 [Urine:4300]    Constitutional: Appears stated age, well nourished, NAD.  Neck: Supple.  JVD not visualized.   Respiratory: Non-labored. Lungs CTAB.  Cardiovascular: RRR, with holosystolic murmur. Bilateral lower extremities with 2+ edema.   GI: Soft, non-distended, non-tender.  Skin: Warm and dry.   Musculoskeletal/Extremities: Symmetrical movement.    Neurologic: No gross focal deficits. Alert, awake.  Psychiatric: Affect appropriate. Mentation normal.    Medications   Current Facility-Administered Medications   Medication Dose Route Frequency Provider Last Rate Last Admin    - MEDICATION INSTRUCTIONS -   Does not apply DOES NOT GO TO Courtney Blackmon APRN CNP        bumetanide (BUMEX) 0.25 mg/mL infusion  0.25 mg/hr Intravenous Continuous Yasir Pérez MD 1 mL/hr at 08/07/24 1338 0.25 mg/hr at 08/07/24 1338    Continuing ACE inhibitor/ARB/ARNI from home medication list OR ACE inhibitor/ARB/ARNI order already placed during this visit   Does not apply DOES NOT GO TO Courtney Blackmon APRN CNP        Continuing beta blocker from home medication list OR beta blocker order already placed during this visit   Does not apply DOES NOT GO TO Courtney Blackmon APRN CNP        Give 1/2 of usual dose of LONG ACTING insulin AM of procedure IF diabetic   Does not apply Continuous PRN Shanthi Wilkins APRN CNP        May take oral  meds with sip of water, the morning of Cardioversion procedure.   Does not apply Continuous PRN Shanthi Wilkins APRN CNP        Patient is already receiving anticoagulation with heparin, enoxaparin (LOVENOX), warfarin (COUMADIN)  or other anticoagulant medication   Does not apply Continuous PRN Courtney Roy APRN CNP         Current Facility-Administered Medications   Medication Dose Route Frequency Provider Last Rate Last Admin    allopurinol (ZYLOPRIM) tablet 300 mg  300 mg Oral Daily Courtney Roy APRN CNP   300 mg at 08/08/24 0751    cetirizine (zyrTEC) tablet 10 mg  10 mg Oral QPM Courtney Roy APRN CNP   10 mg at 08/07/24 2139    finasteride (PROPECIA) tablet 1 mg  1 mg Oral Daily Courtney Roy APRN CNP   1 mg at 08/08/24 0751    losartan (COZAAR) tablet 25 mg  25 mg Oral Daily Parveen Jj MD   25 mg at 08/08/24 0751    miconazole (MICATIN) 2 % powder   Topical BID Masha Moy MD   Given at 08/07/24 2139    polyethylene glycol (MIRALAX) Packet 17 g  17 g Oral Daily Courtney Roy APRN CNP   17 g at 08/08/24 0751    potassium chloride pushpa ER (KLOR-CON M20) CR tablet 40 mEq  40 mEq Oral Once Gloria Brar MD        potassium chloride pushpa ER (KLOR-CON M20) CR tablet 40 mEq  40 mEq Oral TID Yasir Pérez MD   40 mEq at 08/08/24 0751    [START ON 8/9/2024] predniSONE (DELTASONE) tablet 5 mg  5 mg Oral Daily Courtney Roy APRN CNP        psyllium (METAMUCIL/KONSYL) capsule 1-2 capsule  1-2 capsule Oral Daily Courtney Roy APRN CNP   1 capsule at 08/08/24 0751    rivaroxaban ANTICOAGULANT (XARELTO) tablet 20 mg  20 mg Oral Daily with supper Courtney Roy APRN CNP   20 mg at 08/07/24 1605    sodium chloride (PF) 0.9% PF flush 3 mL  3 mL Intracatheter Q8H Courtney Roy APRN CNP   3 mL at 08/08/24 0547    sotalol (BETAPACE) tablet 80 mg  80 mg Oral Q12H [facility] (08/20) Natalie Campos PA-C   80 mg at 08/07/24 5414    spironolactone (ALDACTONE) tablet 25 mg  25  mg Oral Daily Narendra Thomas MD   25 mg at 24 0751       Data   Last 24 hours labs personally reviewed.  Echo:   Recent Results (from the past 4320 hour(s))   Echocardiogram Complete   Result Value    LVEF  55-60%    Narrative    [id number]  [id number]  [id number]  [id number]^REINA^LAURA^TRAVIS     [facility]  Echocardiography Laboratory  [address] [place] [address]     Name: TOO HOWARD  MRN: [id number]  : 1950  Study Date: 2024 10:15 AM  Age: 73 yrs  Gender: Male  Patient Location: [facility]  Reason For Study: Aortic Valve Disorder  Ordering Physician: LAURA HUANG  Referring Physician: Ranjan Courtney,  Performed By: Norah Suarez     BSA: 2.4 m2  Height: 68 in  Weight: 298 lb  HR: 66  BP: 122/76 mmHg  ______________________________________________________________________________  Procedure  Complete Portable Echo Adult. Optison (NDC #3309-1513) given intravenously.  ______________________________________________________________________________  Interpretation Summary     Left ventricular systolic function is normal.  The visual ejection fraction is 55-60%.  There is moderate concentric left ventricular hypertrophy.  The right ventricle is normal in structure, function and size.  Regional wall motion abnormalities cannot be excluded due to limited  visualization despite use of contrast.  Calcified aortic valve. Vmax 3.5 m/sec, mean gradient 31 mmHg, MARILYN 0.9 cm2 by  continuity equation. DVI 0.22. Findings consistent with moderate-severe aortic  stenosis. Doppler interrogation is at a borderline reduced stroke volume index  (38 ml/m2).  Dilation of the inferior vena cava is present with abnormal respiratory  variation in diameter.  There is no pericardial effusion.  Ascending aorta dilatation is present (4.0 cm).     Compared to study dated 2024, there is slight progression of aortic  stenosis however interstudy variabilty in Doppler interrogation  is possible  given poor acoustic windows. Otherwise findings are similar.  ______________________________________________________________________________  Left Ventricle  The left ventricle is normal in size. There is moderate concentric left  ventricular hypertrophy. Left ventricular systolic function is normal. The  visual ejection fraction is 55-60%. Left ventricular diastolic function is  indeterminate. Regional wall motion abnormalities cannot be excluded due to  limited visualization.     Right Ventricle  The right ventricle is normal in structure, function and size.     Atria  The left atrium is mildly dilated. Right atrial size is normal.     Mitral Valve  There is mild to moderate mitral annular calcification. There is mild (1+)  mitral regurgitation.     Tricuspid Valve  The tricuspid valve is not well visualized. Right ventricular systolic  pressure could not be approximated due to inadequate tricuspid regurgitation.     Aortic Valve  The aortic valve is not well visualized. Moderate valvular aortic stenosis.  Vmax 3.5 m/sec, mean gradient 31 mmHg, MARILYN 0.9 cm2 by continuity equation. DVI  0.22. Findings consistent with moderate-severe aortic stenosis. Doppler  interrogation is at a borderline reduced stroke volume index (38 ml/m2).     Pulmonic Valve  The pulmonic valve is normal in structure and function. There is trace  pulmonic valvular regurgitation.     Vessels  The aortic root is normal size. Ascending aorta dilatation is present.  Dilation of the inferior vena cava is present with abnormal respiratory  variation in diameter.     Pericardium  There is no pericardial effusion.     ______________________________________________________________________________  MMode/2D Measurements & Calculations  IVSd: 1.3 cm  LVIDd: 6.2 cm  LVIDs: 4.6 cm  LVPWd: 1.5 cm  FS: 25.3 %  LV mass(C)d: 398.2 grams  LV mass(C)dI: 164.5 grams/m2     Ao root diam: 3.6 cm  LA dimension: 4.7 cm  asc Aorta Diam: 4.0 cm  LA/Ao:  1.3  LVOT diam: 2.5 cm  LVOT area: 4.8 cm2  Ao root diam index Ht(cm/m): 2.1  Ao root diam index BSA (cm/m2): 1.5  Asc Ao diam index BSA (cm/m2): 1.6  Asc Ao diam index Ht(cm/m): 2.3  LA Volume (BP): 93.3 ml  LA Volume Index (BP): 38.6 ml/m2  RWT: 0.47     TAPSE: 3.3 cm     Doppler Measurements & Calculations  MV E max seamus: 86.5 cm/sec  MV A max seamus: 48.8 cm/sec  MV E/A: 1.8  MV dec slope: 455.2 cm/sec2  MV dec time: 0.19 sec  Ao V2 max: 354.4 cm/sec  Ao max P.0 mmHg  Ao V2 mean: 267.2 cm/sec  Ao mean P.5 mmHg  Ao V2 VTI: 97.8 cm  MARILYN(I,D): 0.95 cm2  MARILYN(V,D): 1.1 cm2  LV V1 max P.5 mmHg  LV V1 max: 79.3 cm/sec  LV V1 VTI: 19.5 cm  SV(LVOT): 93.4 ml  SI(LVOT): 38.6 ml/m2  PA acc time: 0.15 sec  AV Seamus Ratio (DI): 0.22  MARILYN Index (cm2/m2): 0.39  E/E' av.9  Lateral E/e': 8.8  Medial E/e': 18.9  RV S Seamus: 13.9 cm/sec     ______________________________________________________________________________  Report approved by: Laurie Pereira 2024 12:54 PM         Echocardiogram Complete   Result Value    LVEF  55%    Narrative    [id number]  [id number]  [id number]  [id number]^GAMALIEL^JOAN^P     [facility]  Echocardiography Laboratory  [address] [place] [id number]     Name: TOO HOWARD  MRN: [id number]  : 1950  Study Date: 2024 11:48 AM  Age: 73 yrs  Gender: Male  Patient Location: [facility]  Reason For Study: Heart Failure  Ordering Physician: JOAN ALSTON  Referring Physician: Ranjan Courtney  Performed By: Norah Suarez     BSA: 2.4 m2  Height: 68 in  Weight: 291 lb  HR: 75  BP: 110/80 mmHg  ______________________________________________________________________________  Procedure  Complete Portable Echo Adult.  ______________________________________________________________________________  Interpretation Summary     Technically challenging study due to patient body habitus.     The left ventricle is borderline dilated. There is mild concentric  left  ventricular hypertrophy.  Left ventricular systolic function is low normal. The visual ejection fraction  is estimated at 55%. Regional wall motion challenging to assess due to  frequent ectopy/irregular R-R intervals however no obvious regional wall  motion abnormalities seen.  The right ventricle is borderline dilated. The right ventricular systolic  function is normal.  Moderate aortic stenosis, Vmax 3.7 m/s, mean gradient 35 mmHg, MARILYN 1.1cm2, DI  0.26. SVi 44 cc/m2. There is mild (1+) aortic regurgitation. Aortic valve  morphology is not well visualized, however it is heavily calcified.  There is no pericardial effusion.  The inferior vena cava was normal in size with preserved respiratory  variability.     This study was compared to a TTE from 2/11/2024. Global LV systolic function  is marginally less dynamic on this present study.  ______________________________________________________________________________  Left Ventricle  The left ventricle is borderline dilated. There is mild concentric left  ventricular hypertrophy. Left ventricular systolic function is low normal. The  visual ejection fraction is estimated at 55%. Grade III or advanced diastolic  dysfunction. Regional wall motion challenging to assess due to frequent  ectopy/irregular R-R intervals however no obvious regional wall motion  abnormalities seen.     Right Ventricle  The right ventricle is borderline dilated. The right ventricular systolic  function is normal.     Atria  The left atrium is moderately dilated. Right atrial size is normal.     Mitral Valve  There is mild mitral annular calcification. There is mild (1+) mitral  regurgitation.     Tricuspid Valve  The tricuspid valve is not well visualized, but is grossly normal.     Aortic Valve  Aortic valve morphology is not well visualized, however it is heavily  calcified. There is mild (1+) aortic regurgitation. Moderate aortic stenosis,  Vmax 3.7 m/s, mean gradient 35 mmHg, MARILYN  1.1cm2, DI 0.26. SVi 44 cc/m2.     Pulmonic Valve  The pulmonic valve is not well seen, but is grossly normal.     Vessels  The aortic root is normal size. Ascending aorta dilatation is present. The  inferior vena cava was normal in size with preserved respiratory variability.     Pericardium  There is no pericardial effusion.     Rhythm  The rhythm was undetermined.  ______________________________________________________________________________  MMode/2D Measurements & Calculations  IVSd: 1.1 cm     LVIDd: 5.9 cm  LVIDs: 3.9 cm  LVPWd: 1.2 cm  FS: 34.8 %  LV mass(C)d: 300.5 grams  LV mass(C)dI: 125.3 grams/m2  Ao root diam: 3.5 cm  LA dimension: 4.8 cm  asc Aorta Diam: 4.0 cm  LA/Ao: 1.3  LVOT diam: 2.4 cm  LVOT area: 4.5 cm2  Ao root diam index Ht(cm/m): 2.1  Ao root diam index BSA (cm/m2): 1.5  Asc Ao diam index BSA (cm/m2): 1.7  Asc Ao diam index Ht(cm/m): 2.3  LA Volume (BP): 111.0 ml     LA Volume Index (BP): 46.3 ml/m2  RWT: 0.42  TAPSE: 1.7 cm     Doppler Measurements & Calculations  MV E max seamus: 115.0 cm/sec  MV A max seamus: 50.9 cm/sec  MV E/A: 2.3  MV dec slope: 682.9 cm/sec2  MV dec time: 0.17 sec  Ao V2 max: 365.0 cm/sec  Ao max P.3 mmHg  Ao V2 mean: 284.5 cm/sec  Ao mean P.5 mmHg  Ao V2 VTI: 93.2 cm  MARILYN(I,D): 1.1 cm2  MARILYN(V,D): 1.2 cm2  AI P1/2t: 415.1 msec  LV V1 max PG: 3.6 mmHg  LV V1 max: 95.2 cm/sec  LV V1 VTI: 23.2 cm  SV(LVOT): 105.1 ml  SI(LVOT): 43.8 ml/m2  PA acc time: 0.08 sec  AV Seamus Ratio (DI): 0.26  MARILYN Index (cm2/m2): 0.47  E/E' av.4     Lateral E/e': 21.2  Medial E/e': 19.5  RV S Seamus: 10.1 cm/sec     ______________________________________________________________________________  Report approved by: Laurie Baires 2024 03:36 PM         Echocardiogram Complete   Result Value    LVEF  55-60%    Narrative    [id number]  [id number]  [id number]^DENNISE^EDITA^MIR     [facility]  Echocardiography Laboratory  [address] [place] [id number]      Name: TOO HOWARD  MRN: [id number]  : 1950  Study Date: 2024 09:42 AM  Age: 73 yrs  Gender: Male  Patient Location: [facility]  Reason For Study: Heart Failure  Ordering Physician: EDITA BOWDEN  Performed By: Millicent Rosenberg     BSA: 2.4 m2  Height: 68 in  Weight: 295 lb  HR: 92  BP: 130/75 mmHg  ______________________________________________________________________________  Procedure  Complete Portable Echo Adult. Optison (NDC #1379-3382) given intravenously.  ______________________________________________________________________________  Interpretation Summary     The visual ejection fraction is 55-60%.  There is moderate concentric left ventricular hypertrophy.  There is mild (1+) mitral regurgitation.  Moderate valvular aortic stenosis.  Ascending aorta dilatation is present.  The rhythm was atrial fibrillation.  The patient exhibited frequent PVCs.  There is moderate biatrial enlargement.  The study was technically difficult. Contrast was used without apparent  complications. Compared to prior study, changes are noted.  ______________________________________________________________________________  Left Ventricle  The left ventricle is normal in size. There is moderate concentric left  ventricular hypertrophy. The visual ejection fraction is 55-60%. Left  ventricular diastolic function is abnormal.     Right Ventricle  The right ventricle is normal in structure, function and size.     Atria  There is moderate biatrial enlargement.     Mitral Valve  There is mild mitral annular calcification. The mitral valve leaflets are  mildly thickened. There is mild (1+) mitral regurgitation.     Tricuspid Valve  The tricuspid valve is not well visualized, but is grossly normal.     Aortic Valve  Moderate valvular aortic stenosis.     Pulmonic Valve  The pulmonic valve is not well visualized.     Vessels  The aortic root is normal size. Ascending aorta dilatation is present.     Pericardium  There  is no pericardial effusion.     Rhythm  The rhythm was atrial fibrillation. The patient exhibited frequent PVCs.  ______________________________________________________________________________  MMode/2D Measurements & Calculations     IVSd: 1.4 cm  LVIDd: 5.4 cm  LVIDs: 4.3 cm  LVPWd: 1.4 cm  FS: 20.9 %  LV mass(C)d: 327.0 grams  LV mass(C)dI: 135.6 grams/m2  Ao root diam: 3.7 cm  LA dimension: 5.3 cm  asc Aorta Diam: 4.0 cm  LA/Ao: 1.4  LVOT diam: 2.4 cm  LVOT area: 4.4 cm2  Ao root diam index Ht(cm/m): 2.1  Ao root diam index BSA (cm/m2): 1.5  Asc Ao diam index BSA (cm/m2): 1.7  Asc Ao diam index Ht(cm/m): 2.3  LA Volume (BP): 72.2 ml  RV Base: 4.1 cm  RWT: 0.52  TAPSE: 2.4 cm     Doppler Measurements & Calculations  MV E max seamus: 138.4 cm/sec  MV dec slope: 814.1 cm/sec2  MV dec time: 0.17 sec  Ao V2 max: 364.4 cm/sec  Ao max P.0 mmHg  Ao V2 mean: 278.0 cm/sec  Ao mean P.9 mmHg  Ao V2 VTI: 86.1 cm  MARILYN(I,D): 1.0 cm2  MARILYN(V,D): 1.1 cm2  LV V1 max PG: 3.2 mmHg  LV V1 max: 90.0 cm/sec  LV V1 VTI: 20.1 cm  SV(LVOT): 88.7 ml  SI(LVOT): 36.8 ml/m2  PA acc time: 0.09 sec  AV Seamus Ratio (DI): 0.25  MARILYN Index (cm2/m2): 0.43  E/E' av.4  Lateral E/e': 16.8  Medial E/e': 16.0  RV S Seamus: 15.6 cm/sec     ______________________________________________________________________________  Report approved by: Laurie Garcia 2024 01:05 PM

## 2024-08-08 NOTE — PLAN OF CARE
[phone]  Brayden is A+OX4, VSS on RA, Tele;SR, denies pain/sob, bumex Infusing at 0.25/hr with great output, affect was calm & cooperative, independent with ambulation, plan is for continuation of diuresis. Will continue with POC as ordered, discharge pending.

## 2024-08-08 NOTE — PROGRESS NOTES
Renal Medicine Progress Note            Assessment/Plan:     73 y.o gentleman grade III DD and severe hypervolemia, consulted for acute kidney injury     # Patient with excellent kidney function in May.      # Acute kidney injury due to CRS: Improved .     # Decompensated heart failure:  baseline of ~ 270 lbs.  Edema is better     # Moderate to severe aortic stenosis. EF 55-60%. Moderate cLVH.      # Morbid obesity with BMI of 46.     # Hypokalemia from diuretic. Getting potassium replacement.     Plan:  # Cont IV Bumex. Please give him a dose of Diuril if he does not have significant net negative fluid balance by this evening.          Interval History:     Afebrile. VSS.  He had total of 4.3 liters urine output.   Weight is down from 299 lbs to 293 lbs.   Kidney function is stable  Brayden would like to stay until he gets to 208-285 lbs before he is discharge on oral diuretics.   He received 2 mg of IV Bumex bolus.             Medications and Allergies:     Current Facility-Administered Medications   Medication Dose Route Frequency Provider Last Rate Last Admin    allopurinol (ZYLOPRIM) tablet 300 mg  300 mg Oral Daily Courtney Roy APRN CNP   300 mg at 08/08/24 0751    cetirizine (zyrTEC) tablet 10 mg  10 mg Oral QPM Courtney Roy APRN CNP   10 mg at 08/07/24 2139    finasteride (PROPECIA) tablet 1 mg  1 mg Oral Daily Courtney Roy APRN CNP   1 mg at 08/08/24 0751    losartan (COZAAR) tablet 25 mg  25 mg Oral Daily Parveen Jj MD   25 mg at 08/08/24 0751    miconazole (MICATIN) 2 % powder   Topical BID Masha Moy MD   Given at 08/07/24 2139    polyethylene glycol (MIRALAX) Packet 17 g  17 g Oral Daily Courtney Roy APRN CNP   17 g at 08/08/24 0751    potassium chloride pushpa ER (KLOR-CON M20) CR tablet 80 mEq  80 mEq Oral TID Yasir Pérez MD        [START ON 8/9/2024] predniSONE (DELTASONE) tablet 5 mg  5 mg Oral Daily Courtney Roy APRN CNP        psyllium  (METAMUCIL/KONSYL) capsule 1-2 capsule  1-2 capsule Oral Daily Courtney Roy APRN CNP   1 capsule at 08/08/24 0751    rivaroxaban ANTICOAGULANT (XARELTO) tablet 20 mg  20 mg Oral Daily with supper Courtney Roy APRN CNP   20 mg at 08/07/24 1605    sodium chloride (PF) 0.9% PF flush 3 mL  3 mL Intracatheter Q8H Courtney Roy APRN CNP   3 mL at 08/08/24 0923    sotalol (BETAPACE) tablet 80 mg  80 mg Oral Q12H ONEAL (08/20) Natalie aCmpos PA-C   80 mg at 08/08/24 0916    spironolactone (ALDACTONE) tablet 25 mg  25 mg Oral Daily Narendra Thomas MD   25 mg at 08/08/24 0751        Allergies   Allergen Reactions    Amiodarone Shortness Of Breath     Suspected amiodarone toxicity involving lungs            Physical Exam:   Vitals were reviewed   , Blood pressure (!) 137/103, pulse 64, temperature 97.3  F (36.3  C), temperature source Oral, resp. rate 18, weight 133.3 kg (293 lb 14.4 oz), SpO2 95%.    Wt Readings from Last 3 Encounters:   08/08/24 133.3 kg (293 lb 14.4 oz)   07/25/24 142 kg (313 lb 1.6 oz)   07/24/24 140.9 kg (310 lb 10.1 oz)       Intake/Output Summary (Last 24 hours) at 8/8/2024 0933  Last data filed at 8/8/2024 0800  Gross per 24 hour   Intake 120 ml   Output 3950 ml   Net -3830 ml     GENERAL APPEARANCE: pleasant, NAD, alert  HEENT:  Eyes/ears/nose/neck grossly normal  RESP: lungs cta b c good efforts, no crackles, rhonchi or wheezes  CV: RRR, + tight murmur  ABDOMEN: obese, soft, NT  EXTREMITIES/SKIN: no rashes/lesions on observed skin; 1+. Can wrinkles in this LE now.  NEURO: Awake, alert and conversing normally.         Data:     CBC RESULTS:     Recent Labs   Lab 08/03/24  0616 08/02/24  0531   WBC 10.2 11.4*   RBC 3.97* 3.84*   HGB 12.6* 12.9*   HCT 40.1 39.2*    222       Basic Metabolic Panel:  Recent Labs   Lab 08/08/24  0618 08/07/24  0623 08/06/24  0628 08/05/24  0715 08/04/24  2239 08/04/24  1558 08/04/24  0624 08/03/24  0616    140 140 141  --   --  139 137    POTASSIUM 3.2* 3.9 3.6 3.7 3.7 3.3* 3.2* 4.8   CHLORIDE 96* 99 99 99  --   --  97* 100   CO2 33* 33* 35* 34*  --   --  34* 32*   BUN 27.7* 28.2* 29.7* 35.1*  --   --  35.3* 33.5*   CR 1.22* 1.20* 1.18* 1.37*  --   --  1.41* 1.41*   GLC 98 96 92 93  --   --  100* 95   ASHELY 9.0 9.2 9.2 8.9  --   --  8.6* 9.2       INRNo lab results found in last 7 days.   Attestation:   I have reviewed today's relevant vital signs, notes, medications, labs and imaging.    Parveen Jj MD  [facility] - Nephrology  Office phone :[phone]  Pager: [phone]

## 2024-08-08 NOTE — DISCHARGE INSTRUCTIONS
Right lateral shin wound(s): Every other day and as needed for loose/soiled dressing:  Cleanse with wound cleanser and gauze. Pat dry.  Cover wound with Polymem:  Cut out 1/4 of the pink foam from a PolyMem strip and place over wound.  Secure with additional Medipore tape or bandaid or similar.  Apply compression from morning until night. Elevate legs whenever possible.

## 2024-08-08 NOTE — PROGRESS NOTES
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     Acute on chronic diastolic CHF exacerbation (HFpEF): Improved  Hypertension (benign essential).  AS, moderate.  [PTA: losartan 50 mg daily; metoprolol 12.5 mg BID; spironolactone 25 mg daily; torsemide 80 mg daily.]  * Echo 5/2024 showed LVEF 55%, no obvious regional wall motion abnormalities; RV borderline dilated with normal systolic function; moderate AS, mild AR.  * On admit, noted primary symptoms of significant weight gain, up to 310 pounds (notes baseline 265-270), diffuse edema, dyspnea. Felt to be multifactorial etiology including ongoing corticosteroid use as well as PAF and CHF.   *  CXR showed mild bibasilar atelectasis/scar, no pleural effusion, normal pulmonary vascularity. Given bumetanide and chlorothiazide IV dose and started on bumetanide gtt on admit.       -Diuresis and adjustment of medications including metoprolol and losartan has been challenging earlier this stay due to episodes of hypotension and ROBIN, and so was on diuretic intermittently. Restarted bumex infusion 8/7. Nephrology and cardiology consulted and following, appreciate assistance.    -Currently on Bumex 0.25 Mg per hour.  Diuril boluses well was given.  Good urine output and weight has trended down.  -On Aldactone, losartan and sotalol, cardiology managing.  -Follow daily weight and intake  and output.  Fluid restriction.  Weight overall has improved but is still above baseline.  On room air.   -s/p Right heart cath done on 7/31/2024, shows moderately elevated right heart filling pressures.  Normal left heart filling pressure.  Wedge pressure 15 mmHg.  -daily BMP, creatinine now stable at 1.2     Persistent A-fib, felt to be contributing to acute CHF.  Status post DC cardioversion: To normal sinus rhythm  * Follows with Dr. Day. H/o multiple antiarrhythmics including flecainide which he failed and amiodarone resulted in pneumonitis. H/o multiple ablations and cardioversion which have subsequently failed.   * Cardiology and EP evaluated patient.   - s/p DC cardioversion to normal sinus rhythm.  With sinus bradycardia initially without any pauses.   -PTA metoprolol changed to sotalol, had prolonged QT, dosage adjusted per EP  -Remained in normal sinus rhythm, have nocturnal bradycardia and pauses.  Continuing with daily.  -Continuing with Xarelto     Recent pneumonitis, suspect from relapse of inflammatory lung process (h/o pulmonary toxicity from amiodarone versus other inflammatory process), on steroids.  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 Follow-up CT 3/2024 showed bilateral interstitial infiltrates with likely component of fibrosis. Recent hospitalization 5/2024 with issues including relapsed inflammatory lung process, started on high dose steroids with slow taper by Pulmonology.  * Initial presentation as above. CXR showed mild bibasilar atelectasis/scar, no pleural effusion, normal pulmonary vascularity. O2 sats 90's on RA on admit.  - On slow prednisone taper, will be on 5 mg daily starting 8/9/2024 for 60 days.  Prophylactic Bactrim no longer needed.  -Patient had follow-up with Pulmonology (pending PFTs and repeat CT for 8/8/2024) but remains in hospital.  Pulmonary consulted for further input.      Exertional dyspnea, suspect multifactorial  including CHF, recent pneumonitis.  - Continue to treat other issues as noted.     BOWEN with variable compliance secondary to frequent nocturia.  - Continue CPAP with 3L O2 while sleeping.     ROBIN on CKD likely stage III.  * Baseline creatinine 1.5.  * Cr 1.56 on admit. Started on diuresis as above. Has fluctuated, up to 2.3   - Cr overall has improved, and now on diuresis as above  - Avoid nephrotoxic medications.   - Appreciate nephrologist following and managing diuretic, losartan and Aldactone     Hypokalemia and hypomagnesemia.  *On scheduled potassium, monitor closely given CKD and also on other potassium sparing agents.  -Creatinine is stable and making good urine output, replacement protocol ordered.     Alcohol use disorder with intermittent spells of sobriety.  * On admit, noted that pt had a few seltzer drinks the past week PTA.  - Cessation encouraged on admit.     Bell's palsy with asymmetric smile.  - Noted.     Lung nodules  * Follows with outpatient Pulmonology.  - Follow-up outpatient.     BPH.  - Continue PTA finasteride.     RLE wound.  * Noted on admit. WOC RN consulted.  - Continue local wound cares per WOC RN.     Severe obesity.  * Body mass index is 45.34 kg/m .  - Needs to continue to pursue aggressive dietary and lifestyle modifications.             Diet: Fluid restriction 2000 ML FLUID  Advance Diet as Tolerated: Regular Diet Adult; Moderate Consistent Carb (60 g CHO per Meal) Diet; 2 gm NA Diet; Low Saturated Fat Diet    DVT Prophylaxis: DOAC  Palmer Catheter: Not present  Lines: None     Cardiac Monitoring: ACTIVE order. Indication: Post- EP procedure (48 hours)  Code Status: Full Code      Clinically Significant Risk Factors        # Hypokalemia: Lowest K = 3.2 mmol/L in last 2 days, will replace as needed           # Hypertension: Noted on problem list  # Acute heart failure with preserved ejection fraction: heart failure noted on problem list, last echo with EF >50%, and receiving IV  "diuretics           # Severe Obesity: Estimated body mass index is 44.69 kg/m  as calculated from the following:    Height as of an earlier encounter on 7/25/24: 1.727 m (5' 8\").    Weight as of this encounter: 133.3 kg (293 lb 14.4 oz).        # Financial/Environmental Concerns: none         Disposition Plan     Medically Ready for Discharge: Anticipated in 2-4 Days once adequately diuresed             Gloria Brar MD  Hospitalist Service  [facility]  Securely message with [email or web address] (more info)  Text page via [facility] Paging/Directory   ______________________________________________________________________    Interval History     Chart reviewed extensively, discussed with nursing staff and patient was seen this morning.  Sitting up in the chair for breakfast.  Patient reports overall dyspnea has improved, he is ambulating from bed to the bathroom and also walked in the hallway short distance with therapy.  Denies chest pain or dizziness.    Physical Exam   Vital Signs: Temp: 97.3  F (36.3  C) Temp src: Oral BP: (!) 137/103 Pulse: 64   Resp: 18 SpO2: 95 % O2 Device: None (Room air)    Weight: 293 lbs 14.4 oz    General: AAOx3, calm and pleasant, appears comfortable.  HEENT: PERRLA EOMI. Mucosa moist.   Lungs: Bilateral equal air entry. Clear to auscultation, normal work of breathing.   CVS: S1S2 regular, no tachycardia or murmur.   Abdomen: Soft, NT, ND. BS heard.  MSK: Bilateral leg edema, lymphedema wraps in place.  Neuro: AAOX3. CN 2-12 normal. Strength symmetrical.  Skin: No rash.       Medical Decision Making       38 MINUTES SPENT BY ME on the date of service doing chart review, history, exam, documentation & further activities per the note.      Data     I have personally reviewed the following data over the past 24 hrs:    N/A  \   N/A   / N/A     139 96 (L) 27.7 (H) /  98   3.2 (L) 33 (H) 1.22 (H) \       Imaging results reviewed over the past 24 hrs:   No results found for this or " any previous visit (from the past 24 hour(s)).

## 2024-08-08 NOTE — PLAN OF CARE
Goal Outcome Evaluation: Pt is A&Ox4, denies pain, VSS and on tele SR, Bumex at 0.25 mg/1 ml/hr - with good UO, on RA, up independently in room. Ambien given at HS. Plan to continue to diurese.      Plan of Care Reviewed With: patient    Overall Patient Progress: no changeOverall Patient Progress: no change     Heart Failure Care Map  GOALS TO BE MET BEFORE DISCHARGE:    1. Decrease congestion and/or edema with diuretic therapy to achieve near optimal volume status.     Dyspnea improved: Yes, satisfactory for discharge.   Edema improved: No, further care required to meet this goal. Please explain need more diuresing        Last 24 hour I/O:   Intake/Output Summary (Last 24 hours) at 8/7/2024 2307  Last data filed at 8/7/2024 2230  Gross per 24 hour   Intake --   Output 4150 ml   Net -4150 ml           Net I/O and Weights since admission:   07/09 0700 - 08/08 0659  In: 84152 [P.O.:16159; I.V.:345]  Out: 95754 [Urine:77244]  Net: -78462     Vitals:    07/25/24 1714 07/26/24 0555 07/27/24 0453 07/28/24 0558   Weight: 140.8 kg (310 lb 6.4 oz) 139.4 kg (307 lb 4.8 oz) 138.8 kg (306 lb) 138.1 kg (304 lb 6.4 oz)    07/29/24 0400 07/30/24 0430 07/31/24 0507 08/01/24 0625   Weight: 136.8 kg (301 lb 9.6 oz) 136.1 kg (300 lb) 135.1 kg (297 lb 12.8 oz) 134.5 kg (296 lb 9.6 oz)    08/02/24 0533 08/03/24 0620 08/04/24 0500 08/05/24 0615   Weight: 137.7 kg (303 lb 9.6 oz) 137.7 kg (303 lb 9.6 oz) 136.8 kg (301 lb 9.6 oz) 135.4 kg (298 lb 6.4 oz)    08/06/24 0600 08/07/24 0616 08/07/24 1508   Weight: 135.3 kg (298 lb 3.2 oz) 135.6 kg (299 lb) 135.8 kg (299 lb 4.8 oz)       2.  O2 sats > 90% on room air, or at prior home O2 therapy level.      Able to wean O2 this shift to keep sats above 90%?: Yes, satisfactory for discharge.   Does patient use Home O2? No          Current oxygenation status:   SpO2: 98 %     O2 Device: None (Room air),      3.  Tolerates ambulation and mobility near baseline.     Ambulation: Yes, satisfactory for  discharge.   Times patient ambulated with staff this shift: 4    Please review the Heart Failure Care Map for additional HF goal outcomes.    Lily Aguila RN  8/7/2024

## 2024-08-09 VITALS
HEART RATE: 55 BPM | WEIGHT: 294.9 LBS | OXYGEN SATURATION: 92 % | SYSTOLIC BLOOD PRESSURE: 140 MMHG | TEMPERATURE: 98 F | BODY MASS INDEX: 44.84 KG/M2 | RESPIRATION RATE: 16 BRPM | DIASTOLIC BLOOD PRESSURE: 86 MMHG

## 2024-08-09 DIAGNOSIS — I50.33 ACUTE ON CHRONIC DIASTOLIC CONGESTIVE HEART FAILURE (H): Primary | ICD-10-CM

## 2024-08-09 LAB
ANION GAP SERPL CALCULATED.3IONS-SCNC: 6 MMOL/L (ref 7–15)
BUN SERPL-MCNC: 27.4 MG/DL (ref 8–23)
CALCIUM SERPL-MCNC: 9 MG/DL (ref 8.8–10.4)
CHLORIDE SERPL-SCNC: 101 MMOL/L (ref 98–107)
CREAT SERPL-MCNC: 1.27 MG/DL (ref 0.67–1.17)
EGFRCR SERPLBLD CKD-EPI 2021: 60 ML/MIN/1.73M2
GLUCOSE SERPL-MCNC: 94 MG/DL (ref 70–99)
HCO3 SERPL-SCNC: 34 MMOL/L (ref 22–29)
POTASSIUM SERPL-SCNC: 3.9 MMOL/L (ref 3.4–5.3)
SODIUM SERPL-SCNC: 141 MMOL/L (ref 135–145)

## 2024-08-09 PROCEDURE — 99223 1ST HOSP IP/OBS HIGH 75: CPT | Performed by: INTERNAL MEDICINE

## 2024-08-09 PROCEDURE — 250N000012 HC RX MED GY IP 250 OP 636 PS 637: Performed by: NURSE PRACTITIONER

## 2024-08-09 PROCEDURE — 250N000013 HC RX MED GY IP 250 OP 250 PS 637: Performed by: INTERNAL MEDICINE

## 2024-08-09 PROCEDURE — 250N000013 HC RX MED GY IP 250 OP 250 PS 637: Performed by: NURSE PRACTITIONER

## 2024-08-09 PROCEDURE — 250N000011 HC RX IP 250 OP 636

## 2024-08-09 PROCEDURE — 250N000013 HC RX MED GY IP 250 OP 250 PS 637: Performed by: PHYSICIAN ASSISTANT

## 2024-08-09 PROCEDURE — 36415 COLL VENOUS BLD VENIPUNCTURE: CPT | Performed by: HOSPITALIST

## 2024-08-09 PROCEDURE — 80048 BASIC METABOLIC PNL TOTAL CA: CPT | Performed by: HOSPITALIST

## 2024-08-09 PROCEDURE — 99207 PR APP CREDIT; MD BILLING SHARED VISIT: CPT | Performed by: HOSPITALIST

## 2024-08-09 PROCEDURE — 250N000011 HC RX IP 250 OP 636: Performed by: INTERNAL MEDICINE

## 2024-08-09 PROCEDURE — 93005 ELECTROCARDIOGRAM TRACING: CPT

## 2024-08-09 PROCEDURE — 99233 SBSQ HOSP IP/OBS HIGH 50: CPT | Mod: FS

## 2024-08-09 PROCEDURE — 99239 HOSP IP/OBS DSCHRG MGMT >30: CPT | Performed by: HOSPITALIST

## 2024-08-09 PROCEDURE — 99232 SBSQ HOSP IP/OBS MODERATE 35: CPT | Performed by: INTERNAL MEDICINE

## 2024-08-09 PROCEDURE — 93010 ELECTROCARDIOGRAM REPORT: CPT | Performed by: INTERNAL MEDICINE

## 2024-08-09 RX ORDER — BUMETANIDE 1 MG/1
3 TABLET ORAL
Qty: 180 TABLET | Refills: 0 | Status: SHIPPED | OUTPATIENT
Start: 2024-08-09 | End: 2024-08-09

## 2024-08-09 RX ORDER — SOTALOL HYDROCHLORIDE 80 MG/1
80 TABLET ORAL EVERY 12 HOURS
Qty: 60 TABLET | Refills: 0 | Status: SHIPPED | OUTPATIENT
Start: 2024-08-09 | End: 2024-08-09

## 2024-08-09 RX ORDER — POTASSIUM CHLORIDE 1500 MG/1
40 TABLET, EXTENDED RELEASE ORAL DAILY
Status: DISCONTINUED | OUTPATIENT
Start: 2024-08-10 | End: 2024-08-09 | Stop reason: HOSPADM

## 2024-08-09 RX ORDER — POTASSIUM CHLORIDE 1500 MG/1
40 TABLET, EXTENDED RELEASE ORAL 3 TIMES DAILY
Status: DISCONTINUED | OUTPATIENT
Start: 2024-08-09 | End: 2024-08-09

## 2024-08-09 RX ORDER — BUMETANIDE 1 MG/1
2 TABLET ORAL 3 TIMES DAILY
Status: DISCONTINUED | OUTPATIENT
Start: 2024-08-09 | End: 2024-08-09

## 2024-08-09 RX ORDER — BUMETANIDE 0.25 MG/ML
2 INJECTION INTRAMUSCULAR; INTRAVENOUS ONCE
Status: COMPLETED | OUTPATIENT
Start: 2024-08-09 | End: 2024-08-09

## 2024-08-09 RX ORDER — CHLORTHALIDONE 25 MG/1
25 TABLET ORAL DAILY
Status: DISCONTINUED | OUTPATIENT
Start: 2024-08-09 | End: 2024-08-09 | Stop reason: HOSPADM

## 2024-08-09 RX ORDER — SOTALOL HYDROCHLORIDE 80 MG/1
80 TABLET ORAL EVERY 12 HOURS
Qty: 60 TABLET | Refills: 0 | Status: SHIPPED | OUTPATIENT
Start: 2024-08-09 | End: 2024-09-07

## 2024-08-09 RX ORDER — BUMETANIDE 1 MG/1
3 TABLET ORAL
Status: DISCONTINUED | OUTPATIENT
Start: 2024-08-09 | End: 2024-08-09 | Stop reason: HOSPADM

## 2024-08-09 RX ORDER — CHLORTHALIDONE 25 MG/1
25 TABLET ORAL DAILY
Qty: 30 TABLET | Refills: 0 | Status: SHIPPED | OUTPATIENT
Start: 2024-08-10 | End: 2024-08-20

## 2024-08-09 RX ORDER — LOSARTAN POTASSIUM 25 MG/1
25 TABLET ORAL DAILY
Qty: 30 TABLET | Refills: 0 | Status: ON HOLD | OUTPATIENT
Start: 2024-08-09 | End: 2024-09-06

## 2024-08-09 RX ORDER — BUMETANIDE 1 MG/1
3 TABLET ORAL
Qty: 180 TABLET | Refills: 0 | Status: SHIPPED | OUTPATIENT
Start: 2024-08-09 | End: 2024-08-15

## 2024-08-09 RX ORDER — CHLORTHALIDONE 25 MG/1
25 TABLET ORAL DAILY
Qty: 30 TABLET | Refills: 0 | Status: SHIPPED | OUTPATIENT
Start: 2024-08-10 | End: 2024-08-09

## 2024-08-09 RX ORDER — POTASSIUM CHLORIDE 1500 MG/1
40 TABLET, EXTENDED RELEASE ORAL 2 TIMES DAILY
Qty: 120 TABLET | Refills: 0 | Status: SHIPPED | OUTPATIENT
Start: 2024-08-09 | End: 2024-09-19

## 2024-08-09 RX ORDER — LOSARTAN POTASSIUM 25 MG/1
25 TABLET ORAL DAILY
Qty: 30 TABLET | Refills: 0 | Status: SHIPPED | OUTPATIENT
Start: 2024-08-09 | End: 2024-08-09

## 2024-08-09 RX ORDER — POTASSIUM CHLORIDE 1500 MG/1
40 TABLET, EXTENDED RELEASE ORAL 2 TIMES DAILY
Qty: 120 TABLET | Refills: 0 | Status: SHIPPED | OUTPATIENT
Start: 2024-08-09 | End: 2024-08-09

## 2024-08-09 RX ADMIN — ZOLPIDEM TARTRATE 5 MG: 5 TABLET, COATED ORAL at 00:06

## 2024-08-09 RX ADMIN — SOTALOL HYDROCHLORIDE 80 MG: 80 TABLET ORAL at 11:49

## 2024-08-09 RX ADMIN — CHLORTHALIDONE 25 MG: 25 TABLET ORAL at 13:18

## 2024-08-09 RX ADMIN — SPIRONOLACTONE 25 MG: 25 TABLET ORAL at 08:31

## 2024-08-09 RX ADMIN — ALLOPURINOL 300 MG: 300 TABLET ORAL at 08:30

## 2024-08-09 RX ADMIN — MICONAZOLE NITRATE: 2 POWDER TOPICAL at 08:40

## 2024-08-09 RX ADMIN — POTASSIUM CHLORIDE 40 MEQ: 1500 TABLET, EXTENDED RELEASE ORAL at 08:30

## 2024-08-09 RX ADMIN — POLYETHYLENE GLYCOL 3350 17 G: 17 POWDER, FOR SOLUTION ORAL at 08:32

## 2024-08-09 RX ADMIN — BUMETANIDE 2 MG: 0.25 INJECTION INTRAMUSCULAR; INTRAVENOUS at 13:16

## 2024-08-09 RX ADMIN — FINASTERIDE 1 MG: 1 TABLET, FILM COATED ORAL at 08:31

## 2024-08-09 RX ADMIN — PREDNISONE 5 MG: 5 TABLET ORAL at 08:31

## 2024-08-09 RX ADMIN — Medication 2 CAPSULE: at 08:30

## 2024-08-09 RX ADMIN — BUMETANIDE 2 MG: 0.25 INJECTION INTRAMUSCULAR; INTRAVENOUS at 08:30

## 2024-08-09 ASSESSMENT — ACTIVITIES OF DAILY LIVING (ADL)
ADLS_ACUITY_SCORE: 22

## 2024-08-09 NOTE — PROGRESS NOTES
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     Acute on chronic diastolic CHF exacerbation (HFpEF): Improved  Hypertension (benign essential).  AS, moderate.  [PTA: losartan 50 mg daily; metoprolol 12.5 mg BID; spironolactone 25 mg daily; torsemide 80 mg daily.]  * Echo 5/2024 showed LVEF 55%, no obvious regional wall motion abnormalities; RV borderline dilated with normal systolic function; moderate AS, mild AR.  * On admit, noted primary symptoms of significant weight gain, up to 310 pounds (notes baseline 265-270), diffuse edema, dyspnea. Felt to be multifactorial etiology including ongoing corticosteroid use as well as PAF and CHF.   *  CXR showed mild bibasilar atelectasis/scar, no pleural effusion, normal pulmonary vascularity. Given bumetanide and chlorothiazide IV dose and started on bumetanide gtt on admit.       -Diuresis and adjustment of medications including metoprolol and losartan has been challenging earlier this stay due to episodes of hypotension and ROBIN, and so was on diuretic intermittently. Restarted bumex infusion 8/7 with additional IV doses. Nephrology and cardiology consulted and following, appreciate assistance.    -Currently on Bumex 0.25 Mg per hour. Good urine output and weight has trended down.  -On Aldactone, losartan and sotalol, cardiology managing.  -Follow daily weight and intake and  output.  Fluid restriction.  Weight overall has improved but is still above baseline.  On room air.   -s/p Right heart cath done on 7/31/2024, shows moderately elevated right heart filling pressures.  Normal left heart filling pressure.  Wedge pressure 15 mmHg.  -daily BMP, creatinine now stable at 1.2     Persistent A-fib, felt to be contributing to acute CHF.  Status post DC cardioversion: To normal sinus rhythm  * Follows with Dr. Day. H/o multiple antiarrhythmics including flecainide which he failed and amiodarone resulted in pneumonitis. H/o multiple ablations and cardioversion which have subsequently failed.   * Cardiology and EP evaluated patient.   - s/p DC cardioversion to normal sinus rhythm.  With sinus bradycardia initially without any pauses.   -PTA metoprolol changed to sotalol, had prolonged QT, dosage adjusted per EP  -Remained in normal sinus rhythm, have nocturnal bradycardia and pauses.  Continuing with daily.  -Continuing with Xarelto     Recent pneumonitis, suspect from relapse of inflammatory lung process (h/o pulmonary toxicity from amiodarone versus other inflammatory process), on steroids.  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 Follow-up CT 3/2024 showed bilateral interstitial infiltrates with likely component of fibrosis. Recent hospitalization 5/2024 with issues including relapsed inflammatory lung process, started on high dose steroids with slow taper per Pulmonology.  * Initial presentation as above. CXR showed mild bibasilar atelectasis/scar, no pleural effusion, normal pulmonary vascularity. O2 sats 90's on RA on admit.  - Prednisone taper discontinued 8/9.   - Patient had follow-up with Pulmonology (pending PFTs and repeat CT for 8/8/2024) but remained in hospital.  Pulmonary consulted and they will reschedule appt for follow up after discharge to get PFT/CT. No plan to do CT now.     Exertional dyspnea, suspect multifactorial including CHF,  recent pneumonitis.  - Continue to treat other issues as noted.     BOWEN with variable compliance secondary to frequent nocturia.  - Continue CPAP with 3L O2 while sleeping.     ROBIN on CKD likely stage III.  * Baseline creatinine 1.5.  * Cr 1.56 on admit. Started on diuresis as above. Has fluctuated, up to 2.3   - Cr overall has improved, and now on diuresis as above  - Avoid nephrotoxic medications.   - Appreciate nephrologist following and managing diuretic, losartan and Aldactone     Hypokalemia and hypomagnesemia.  *On scheduled potassium, monitor closely given CKD and also on other potassium sparing agents.  -Creatinine is stable and making good urine output, replacement protocol ordered.     Alcohol use disorder with intermittent spells of sobriety.  * On admit, noted that pt had a few seltzer drinks the past week PTA.  - Cessation encouraged on admit.     Bell's palsy with asymmetric smile.  - Noted.     Lung nodules  * Follows with outpatient Pulmonology.  - Follow-up outpatient.     BPH.  - Continue PTA finasteride.     RLE wound.  * Noted on admit. WOC RN consulted.  - Continue local wound cares per WOC RN.     Severe obesity.  * Body mass index is 45.34 kg/m .  - Needs to continue to pursue aggressive dietary and lifestyle modifications.             Diet: Fluid restriction 2000 ML FLUID  Advance Diet as Tolerated: Regular Diet Adult; Moderate Consistent Carb (60 g CHO per Meal) Diet; 2 gm NA Diet; Low Saturated Fat Diet    DVT Prophylaxis: DOAC  Palmer Catheter: Not present  Lines: None     Cardiac Monitoring: ACTIVE order. Indication: Post- EP procedure (48 hours)  Code Status: Full Code      Clinically Significant Risk Factors        # Hypokalemia: Lowest K = 3.2 mmol/L in last 2 days, will replace as needed           # Hypertension: Noted on problem list  # Acute heart failure with preserved ejection fraction: heart failure noted on problem list, last echo with EF >50%, and receiving IV diuretics          "  # Severe Obesity: Estimated body mass index is 44.84 kg/m  as calculated from the following:    Height as of an earlier encounter on 7/25/24: 1.727 m (5' 8\").    Weight as of this encounter: 133.8 kg (294 lb 14.4 oz).        # Financial/Environmental Concerns: none         Disposition Plan     Medically Ready for Discharge: Anticipated in 2-4 Days once adequately diuresed             Gloria Brar MD  Hospitalist Service  [facility]  Securely message with [employer] (more info)  Text page via [facility] Paging/Directory   ______________________________________________________________________    Interval History     Chart reviewed extensively, discussed with nursing staff and patient was seen this morning.  Good urine output of > 3L, but not reflected in the weight which remains the same.  Creatinine stable.  Patient feels his breathing is overall better now.     Physical Exam   Vital Signs: Temp: 98  F (36.7  C) Temp src: Oral BP: 123/77 Pulse: 51   Resp: 16 SpO2: 93 % O2 Device: None (Room air)    Weight: 294 lbs 14.4 oz    General: AAOx3, calm and pleasant, appears comfortable.  HEENT: PERRLA EOMI. Mucosa moist.   Lungs: Bilateral equal air entry. Clear to auscultation, normal work of breathing.   CVS: S1S2 regular, no tachycardia or murmur.   Abdomen: Soft, NT, ND. BS heard.  MSK: Bilateral leg edema   Neuro: AAOX3. CN 2-12 normal. Strength symmetrical.  Skin: No rash.       Medical Decision Making       25 MINUTES SPENT BY ME on the date of service doing chart review, history, exam, documentation & further activities per the note.      Data     I have personally reviewed the following data over the past 24 hrs:    N/A  \   N/A   / N/A     141 101 27.4 (H) /  94   3.9 34 (H) 1.27 (H) \       Imaging results reviewed over the past 24 hrs:   No results found for this or any previous visit (from the past 24 hour(s)).  "

## 2024-08-09 NOTE — PROGRESS NOTES
" Renal Medicine Progress Note            Assessment/Plan:     73 y.o gentleman grade III DD and severe hypervolemia, consulted for acute kidney injury     # Patient with excellent kidney function in May.      # Acute kidney injury due to CRS: Improved and stable .     # Decompensated heart failure:  baseline of ~ 270 lbs.  Edema is better     # Moderate to severe aortic stenosis. EF 55-60%. Moderate cLVH.      # Morbid obesity with BMI of 46.      # Hypokalemia from diuretic. Getting potassium replacement.     Plan:  # Stop IV Bumex  # Start oral Bumex 2 mg tid  # Add Chlorthalidone 25 mg daily  # Will consider SLGT2i outpatient  #  He will discussed GLP1 RA with his primary care provider  # Make sure he responses to oral diuretics before he is discharged.  # I make a FUV with us on 8/13 at 3 PM ([facility], [address] [place] [address], [address], phone [phone]).     I reviewed notes from cardiology and from IM.            Interval History:     Afebrile. VSS  \"I feel good,\" he says.  He says he was told he could go home tomorrow.  He is still on IV Bumex.   Net neg 2 liters last 24 hrs.  Kidney function is stable.           Medications and Allergies:     Current Facility-Administered Medications   Medication Dose Route Frequency Provider Last Rate Last Admin    allopurinol (ZYLOPRIM) tablet 300 mg  300 mg Oral Daily Courtney Roy APRN CNP   300 mg at 08/09/24 0830    cetirizine (zyrTEC) tablet 10 mg  10 mg Oral QPM Courtney Roy APRN CNP   10 mg at 08/08/24 2038    finasteride (PROPECIA) tablet 1 mg  1 mg Oral Daily Courtney Roy APRN CNP   1 mg at 08/09/24 0831    losartan (COZAAR) tablet 25 mg  25 mg Oral Daily Parveen Jj MD   25 mg at 08/08/24 0751    miconazole (MICATIN) 2 % powder   Topical BID Masha Moy MD   Given at 08/09/24 0840    polyethylene glycol (MIRALAX) Packet 17 g  17 g Oral Daily Courtney Roy APRN CNP   17 g at 08/09/24 0832    potassium chloride pushpa ER " (KLOR-CON M20) CR tablet 40 mEq  40 mEq Oral TID Yasir Pérez MD   40 mEq at 08/09/24 0830    psyllium (METAMUCIL/KONSYL) capsule 1-2 capsule  1-2 capsule Oral Daily Courtney Roy APRN CNP   2 capsule at 08/09/24 0830    rivaroxaban ANTICOAGULANT (XARELTO) tablet 20 mg  20 mg Oral Daily with supper Courtney Roy APRN CNP   20 mg at 08/08/24 1650    sodium chloride (PF) 0.9% PF flush 3 mL  3 mL Intracatheter Q8H Courtney Roy APRN CNP   3 mL at 08/08/24 0923    sotalol (BETAPACE) tablet 80 mg  80 mg Oral Q12H ONEAL (08/20) Natalie Campos PA-C   80 mg at 08/08/24 2211    spironolactone (ALDACTONE) tablet 25 mg  25 mg Oral Daily Narendra Thomas MD   25 mg at 08/09/24 0831        Allergies   Allergen Reactions    Amiodarone Shortness Of Breath     Suspected amiodarone toxicity involving lungs            Physical Exam:   Vitals were reviewed   , Blood pressure 123/77, pulse 51, temperature 98  F (36.7  C), temperature source Oral, resp. rate 16, weight 133.8 kg (294 lb 14.4 oz), SpO2 93%.    Wt Readings from Last 3 Encounters:   08/09/24 133.8 kg (294 lb 14.4 oz)   07/25/24 142 kg (313 lb 1.6 oz)   07/24/24 140.9 kg (310 lb 10.1 oz)       Intake/Output Summary (Last 24 hours) at 8/9/2024 1050  Last data filed at 8/9/2024 0842  Gross per 24 hour   Intake 1510 ml   Output 3580 ml   Net -2070 ml       GENERAL APPEARANCE: pleasant, NAD, alert  HEENT:  Eyes/ears/nose/neck grossly normal  RESP: lungs cta b c good efforts, no crackles, rhonchi or wheezes  CV: RRR, + tight murmur  ABDOMEN: obese, soft, NT  EXTREMITIES/SKIN: no rashes/lesions on observed skin; 2+.   NEURO: Awake, alert and conversing normally.         Data:     CBC RESULTS:     Recent Labs   Lab 08/03/24  0616   WBC 10.2   RBC 3.97*   HGB 12.6*   HCT 40.1          Basic Metabolic Panel:  Recent Labs   Lab 08/09/24  0638 08/08/24  1359 08/08/24  0618 08/07/24  0623 08/06/24  0628 08/05/24  0715 08/04/24  1558 08/04/24  0624      --  139 140 140 141  --  139   POTASSIUM 3.9 4.1 3.2* 3.9 3.6 3.7   < > 3.2*   CHLORIDE 101  --  96* 99 99 99  --  97*   CO2 34*  --  33* 33* 35* 34*  --  34*   BUN 27.4*  --  27.7* 28.2* 29.7* 35.1*  --  35.3*   CR 1.27*  --  1.22* 1.20* 1.18* 1.37*  --  1.41*   GLC 94  --  98 96 92 93  --  100*   ASHELY 9.0  --  9.0 9.2 9.2 8.9  --  8.6*    < > = values in this interval not displayed.       INRNo lab results found in last 7 days.   Attestation:   I have reviewed today's relevant vital signs, notes, medications, labs and imaging.    Parveen Jj MD  [facility] - Nephrology  Office phone :[phone]  Pager: [phone]

## 2024-08-09 NOTE — CONSULTS
Unfortunately, patient's insurance doesn't contract with [facility] so I am unable to determine cost.    He would be eligible to download copay cards from the  websites to decrease his copay as needed. [employer] and [email or web address] offer $175 off per month.    Clarita Cerrato  Pharmacy Technician/Liaison, Discharge Pharmacy  [phone] (voice or text)  [email or web address]

## 2024-08-09 NOTE — PROGRESS NOTES
Dr Brar to send prescriptions to [facility] in [place]. [facility] does not contract with his insurance carrier.

## 2024-08-09 NOTE — PLAN OF CARE
Patient is alert and oriented x4. VSS on RA, tom at times. Tele SR. Denies pain. Bumex gtt at 0.25 mg/hr. Up independent in the room. Ambien given at bed time. Continue with plan of care.

## 2024-08-09 NOTE — PLAN OF CARE
Goal Outcome Evaluation:       [id number]  CHF exacerbation  A&O x4.  VSS on RA, HR SR. Lungs sounds  Bowel Sounds - normoactive, no BM during shift. Urine Output - adequate in urinal, diuresing. Ambulation - Independent in room. Diet - mod carb, 60 g, <2400 Na, 2000 fl restriction. Pain controlled by - denied pain during shift. Bumex drip 1 ml/hr. Pulm consult ordered, will visit tomorrow. Pt to discharge in 2-4 days once adequately diuresed.

## 2024-08-09 NOTE — PLAN OF CARE
VSS, not in pain at time of discharge. Pt states all belongings and paperwork are accounted for. Medication prescriptions have been sent to [facility] in [place] per pt request. Discharge education complete including medications, follow up appointments, heart failure stop light tool and weight chart, and all instructions. Pt verbalized understanding. All questions answered. Family here to drive pt home.

## 2024-08-09 NOTE — PLAN OF CARE
Occupational Therapy Discharge Summary    Reason for therapy discharge:    Discharged to home.    Progress towards therapy goal(s). See goals on Care Plan in [facility] electronic health record for goal details.  Goals not met.  Barriers to achieving goals:   discharge from facility.    Therapy recommendation(s):    Home with assist in strenuous I/ADls (home managment tasks, shopping etc). Recommend OP CR for a progressive monitored exercise program and to promote a heart healthly lifestyle

## 2024-08-09 NOTE — DISCHARGE SUMMARY
"[facility]  Hospitalist Discharge Summary      Date of Admission:  7/25/2024  Date of Discharge:  8/9/2024  Discharging Provider: Gloria Brar MD  Discharge Service: Hospitalist Service    Discharge Diagnoses     Please refer to the hospital course below    Clinically Significant Risk Factors     # Severe Obesity: Estimated body mass index is 44.84 kg/m  as calculated from the following:    Height as of an earlier encounter on 7/25/24: 1.727 m (5' 8\").    Weight as of this encounter: 133.8 kg (294 lb 14.4 oz).       Follow-ups Needed After Discharge   Follow-up Appointments     Follow-up and recommended labs and tests       Follow up with primary care provider, Ranjan Courtney, within 7 days to   evaluate medication change, to evaluate treatment change, for hospital   follow- up, To follow up on lab and medications refill.  The following   labs/tests are recommended: BMP at follow up.             Unresulted Labs Ordered in the Past 30 Days of this Admission       No orders found from 6/25/2024 to 7/26/2024.        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     Acute on chronic diastolic CHF exacerbation (HFpEF): Improved  Hypertension (benign essential).  AS, moderate.  [PTA: losartan 50 mg daily; metoprolol 12.5 mg BID; spironolactone 25 mg daily; torsemide 80 mg daily.]  * Echo 5/2024 showed LVEF " 55%, no obvious regional wall motion abnormalities; RV borderline dilated with normal systolic function; moderate AS, mild AR.  * On admit, noted primary symptoms of significant weight gain, up to 310 pounds (notes baseline 265-270), diffuse edema, dyspnea. Felt to be multifactorial etiology including ongoing corticosteroid use as well as PAF and CHF.   *  CXR showed mild bibasilar atelectasis/scar, no pleural effusion, normal pulmonary vascularity. Given bumetanide and chlorothiazide IV dose and started on bumetanide gtt on admit.       -Diuresis and adjustment of medications including metoprolol and losartan has been challenging earlier this stay due to episodes of hypotension and ROBIN, and so was on diuretic intermittently. Restarted bumex infusion 8/7 with additional IV bolus doses. Nephrology and cardiology consulted and following, appreciate assistance. Has responded well to bumex infusion, urine output good and weight is down (although not quite yet stated dry weight). Discontinuing bumex infusion, additional IV doses today and transitioning to PO Bumex and discharging home today.    -On Aldactone, losartan (reduced dose) and sotalol per cardiology    - On room air.   -s/p Right heart cath done on 7/31/2024, shows moderately elevated right heart filling pressures.  Normal left heart filling pressure.  Wedge pressure 15 mmHg.  -daily BMP, creatinine now stable at 1.2  -has outpatient cardiology appoint set up before discharge.      Persistent A-fib, felt to be contributing to acute CHF.  Status post DC cardioversion: To normal sinus rhythm  * Follows with Dr. Day. H/o multiple antiarrhythmics including flecainide which he failed and amiodarone resulted in pneumonitis. H/o multiple ablations and cardioversion which have subsequently failed.   * Cardiology and EP evaluated patient.   - s/p DC cardioversion to normal sinus rhythm.  With sinus bradycardia initially without any pauses.   -PTA metoprolol changed to  sotalol, had prolonged QT, dosage adjusted per EP  -Remained in normal sinus rhythm, have nocturnal bradycardia and pauses.    -Continuing with Xarelto     Recent pneumonitis, suspect from relapse of inflammatory lung process (h/o pulmonary toxicity from amiodarone versus other inflammatory process), on steroids.  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 Follow-up CT 3/2024 showed bilateral interstitial infiltrates with likely component of fibrosis. Recent hospitalization 5/2024 with issues including relapsed inflammatory lung process, started on high dose steroids with slow taper per Pulmonology.  * Initial presentation as above. CXR showed mild bibasilar atelectasis/scar, no pleural effusion, normal pulmonary vascularity. O2 sats 90's on RA on admit.  - Prednisone taper discontinued 8/9.   - Patient had follow-up with Pulmonology (pending PFTs and repeat CT for 8/8/2024) but remained in hospital.  Pulmonary consulted and they will reschedule appt for follow up after discharge to get PFT/CT. No plan to do CT now.     Exertional dyspnea, suspect multifactorial including CHF, recent pneumonitis.  - Continue to treat other issues as noted.     BOWEN with variable compliance secondary to frequent nocturia.  - Continue CPAP with 3L O2 while sleeping.     ROBIN on CKD likely stage III.  * Baseline creatinine 1.5.  * Cr 1.56 on admit. Started on diuresis as above. Has fluctuated, up to 2.3   - Cr overall has improved, and now on diuresis as above   - Appreciate nephrologist managing diuretic, losartan and Aldactone     Hypokalemia and hypomagnesemia.  *On scheduled potassium, monitor closely given CKD and also on other potassium sparing agents.  -Creatinine is stable and making good urine output, replacement protocol ordered.  -he is on large amount of scheduled potassium PTA. Based on daily needs here, discharging on potassium 40 mEq BID.   - He will have BMP during  PCP/cardiology/nephrology follow up.      Alcohol use disorder with intermittent spells of sobriety.  * On admit, noted that pt had a few seltzer drinks the past week PTA.  - Cessation encouraged on admit.     Bell's palsy with asymmetric smile.  - Noted.     Lung nodules  * Follows with outpatient Pulmonology.  - Follow-up outpatient.     BPH.  - Continue PTA finasteride.     RLE wound.  * Noted on admit. WOC RN consulted.  - Continue local wound cares per WOC RN.     Severe obesity.  * Body mass index is 45.34 kg/m .  - Needs to continue to pursue aggressive dietary and lifestyle modifications.       Consultations This Hospital Stay   WOUND OSTOMY CONTINENCE NURSE  IP CONSULT  OCCUPATIONAL THERAPY ADULT IP CONSULT  CARDIOLOGY IP CONSULT  PHYSICAL THERAPY ADULT IP CONSULT  CORE CLINIC EVALUATION IP CONSULT  CARE MANAGEMENT / SOCIAL WORK IP CONSULT  CARDIAC REHAB IP CONSULT  ELECTROPHYSIOLOGY IP CONSULT  SPIRITUAL HEALTH SERVICES IP CONSULT  NEPHROLOGY IP CONSULT  CARDIOLOGY IP CONSULT  PULMONARY IP CONSULT  PHARMACY LIAISON FOR MEDICATION COVERAGE CONSULT  SPIRITUAL HEALTH SERVICES IP CONSULT    Code Status   Full Code    Time Spent on this Encounter   I, Gloria Brar MD, personally saw the patient today and spent greater than 30 minutes discharging this patient.       Gloria Brar MD  [facility]  [address] [place] [address], [address]  [facility] [address]  Phone: [phone]  ______________________________________________________________________    Physical Exam   Vital Signs: Temp: 98  F (36.7  C) Temp src: Oral BP: (!) 140/86 Pulse: 55   Resp: 16 SpO2: 92 % O2 Device: None (Room air)    Weight: 294 lbs 14.4 oz    Please refer to the progress note from the same day       Primary Care Physician   Ranjan Courtney    Discharge Orders      Basic metabolic panel     N terminal pro BNP outpatient     Primary Care - Care Coordination Referral      Medication Therapy Management Referral       Cardiac Rehab  Referral      Cardiac Rehab  Referral      Reason for your hospital stay    Acute congestive heart failure     Follow-up and recommended labs and tests     Follow up with primary care provider, Ranjan Courtney, within 7 days to evaluate medication change, to evaluate treatment change, for hospital follow- up, To follow up on lab and medications refill.  The following labs/tests are recommended: BMP at follow up.     Activity    Your activity upon discharge: activity as tolerated     Diet    Follow this diet upon discharge: Orders Placed This Encounter      Fluid restriction 2000 ML FLUID      Advance Diet as Tolerated: Regular Diet Adult; Moderate Consistent Carb (60 g CHO per Meal) Diet; 2 gm NA Diet; Low Saturated Fat Diet       Significant Results and Procedures   Most Recent 3 CBC's:  Recent Labs   Lab Test 08/03/24  0616 08/02/24  0531 07/27/24  0627   WBC 10.2 11.4* 9.8   HGB 12.6* 12.9* 13.7   * 102* 98    222 231     Most Recent 3 BMP's:  Recent Labs   Lab Test 08/09/24  0638 08/08/24  1359 08/08/24  0618 08/07/24  0623     --  139 140   POTASSIUM 3.9 4.1 3.2* 3.9   CHLORIDE 101  --  96* 99   CO2 34*  --  33* 33*   BUN 27.4*  --  27.7* 28.2*   CR 1.27*  --  1.22* 1.20*   ANIONGAP 6*  --  10 8   ASHELY 9.0  --  9.0 9.2   GLC 94  --  98 96     Most Recent 2 LFT's:  Recent Labs   Lab Test 05/05/24  1003 02/11/24  0220   AST 34 28   ALT 40 37   ALKPHOS 92 53   BILITOTAL 1.3* 0.3     Most Recent 3 Troponin's:No lab results found.  Most Recent 3 BNP's:  Recent Labs   Lab Test 05/05/24  1003 02/11/24  0220 11/28/23  0623 11/25/23  1934 11/24/23  1215 11/29/16  1840   NTBNPI 1,202* 442 160   < >  --   --    NTBNP  --   --   --   --  274 720*    < > = values in this interval not displayed.     Most Recent Cholesterol Panel:  Recent Labs   Lab Test 07/27/23  0955   CHOL 163   LDL 97   HDL 39*   TRIG 133     Most Recent TSH and T4:  Recent Labs   Lab Test  11/24/23  1215 01/17/23  1234 10/18/22  0848   TSH 2.67   < > 4.33*   T4  --   --  1.06    < > = values in this interval not displayed.     Most Recent 6 glucoses:  Recent Labs   Lab Test 08/09/24  0638 08/08/24  0618 08/07/24  0623 08/06/24  0628 08/05/24  0715 08/04/24  0624   GLC 94 98 96 92 93 100*   ,   Results for orders placed or performed during the hospital encounter of 07/25/24   XR Chest Port 1 View    Narrative    EXAM: XR CHEST PORT 1 VIEW  LOCATION: [facility]  DATE: 7/25/2024    INDICATION: exertional dyspnea  COMPARISON: 5/5/2024      Impression    IMPRESSION: Mild bibasilar atelectasis/scar. No pleural effusion. Stable cardiac silhouette at the upper limits of normal for size. Normal pulmonary vascularity.   US Abdomen Limited    Narrative    US ABDOMEN LIMITED 7/29/2024 4:05 PM    CLINICAL HISTORY: rule out ascites  TECHNIQUE: CT abdomen pelvis 8/3/2023    COMPARISON: None.      Impression    IMPRESSION:    Targeted ultrasound scanning of the 4 abdominal quadrants demonstrates  no ascites.    SEBASTIÁN CHIU MD         SYSTEM ID:  [id number]   EP Cardioversion External    Narrative    Ajay Almazan MD     8/1/2024  9:27 AM  [facility]    Procedure: EP Cardioversion External    Date/Time: 8/1/2024 9:26 AM    Performed by: Ajay Almazan MD  Authorized by: Shanthi Wilkins APRN [facility]      UNIVERSAL PROTOCOL   Site Marked: Yes  Prior Images Obtained and Reviewed:  Yes  Required items: Required blood products, implants, devices and special   equipment available    Patient identity confirmed:  Verbally with patient  Patient was reevaluated immediately before administering moderate or deep   sedation or anesthesia  Confirmation Checklist:  Patient's identity using two indicators  Time out: Immediately prior to the procedure a time out was called    Universal Protocol: the Joint Commission Universal Protocol was followed    Preparation:  Patient was prepped and draped in usual sterile fashion       ANESTHESIA    Anesthesia was administered and monitored by anesthesiology.  See   anesthesia documentation for details.    SEDATION  Patient Sedated: Yes    Vital signs: Vital signs monitored during sedation      PROCEDURE DETAILS  Cardioversion basis: elective  Indications: failure of anti-arrhythmic medications  Pre-procedure rhythm: atrial fibrillation  Patient position: patient was placed in a supine position  Chest area: chest area exposed  Electrodes: pads  Electrodes placed: anterior-posterior  Number of attempts: 1    Details of Attempts:  DC Cardioversion Procedure Note:    Informed consent obtained.  Pads placed in AP position.  Anesthesia used, please see their documentation.    Synchronized, biphasic 200J shock x 1 delivered with manual compression   was successful in converting atrial fibrillation (HR 80) to sinus   bradycardia (HR 50) without pauses.    No apparent complications.   Post-procedure rhythm: normal sinus rhythm  Complications: no complications      PROCEDURE    Patient Tolerance:  Patient tolerated the procedure well with no immediate   complications   Echocardiogram Complete     Value    LVEF  55-60%    Narrative    [id number]  [id number]  [id number]  [id number]^REINA^LAURA^TRAVIS     [facility]  Echocardiography Laboratory  [address] [place] [address]     Name: TOO HOWARD  MRN: [id number]  : 1950  Study Date: 2024 10:15 AM  Age: 73 yrs  Gender: Male  Patient Location: [facility]  Reason For Study: Aortic Valve Disorder  Ordering Physician: LAURA HUANG  Referring Physician: Ranjan Courtney,  Performed By: Norah Suarez     BSA: 2.4 m2  Height: 68 in  Weight: 298 lb  HR: 66  BP: 122/76 mmHg  ______________________________________________________________________________  Procedure  Complete Portable Echo Adult. Optison (NDC #[id number]) given  intravenously.  ______________________________________________________________________________  Interpretation Summary     Left ventricular systolic function is normal.  The visual ejection fraction is 55-60%.  There is moderate concentric left ventricular hypertrophy.  The right ventricle is normal in structure, function and size.  Regional wall motion abnormalities cannot be excluded due to limited  visualization despite use of contrast.  Calcified aortic valve. Vmax 3.5 m/sec, mean gradient 31 mmHg, MARILYN 0.9 cm2 by  continuity equation. DVI 0.22. Findings consistent with moderate-severe aortic  stenosis. Doppler interrogation is at a borderline reduced stroke volume index  (38 ml/m2).  Dilation of the inferior vena cava is present with abnormal respiratory  variation in diameter.  There is no pericardial effusion.  Ascending aorta dilatation is present (4.0 cm).     Compared to study dated 5/6/2024, there is slight progression of aortic  stenosis however interstudy variabilty in Doppler interrogation is possible  given poor acoustic windows. Otherwise findings are similar.  ______________________________________________________________________________  Left Ventricle  The left ventricle is normal in size. There is moderate concentric left  ventricular hypertrophy. Left ventricular systolic function is normal. The  visual ejection fraction is 55-60%. Left ventricular diastolic function is  indeterminate. Regional wall motion abnormalities cannot be excluded due to  limited visualization.     Right Ventricle  The right ventricle is normal in structure, function and size.     Atria  The left atrium is mildly dilated. Right atrial size is normal.     Mitral Valve  There is mild to moderate mitral annular calcification. There is mild (1+)  mitral regurgitation.     Tricuspid Valve  The tricuspid valve is not well visualized. Right ventricular systolic  pressure could not be approximated due to inadequate tricuspid  regurgitation.     Aortic Valve  The aortic valve is not well visualized. Moderate valvular aortic stenosis.  Vmax 3.5 m/sec, mean gradient 31 mmHg, MARILYN 0.9 cm2 by continuity equation. DVI  0.22. Findings consistent with moderate-severe aortic stenosis. Doppler  interrogation is at a borderline reduced stroke volume index (38 ml/m2).     Pulmonic Valve  The pulmonic valve is normal in structure and function. There is trace  pulmonic valvular regurgitation.     Vessels  The aortic root is normal size. Ascending aorta dilatation is present.  Dilation of the inferior vena cava is present with abnormal respiratory  variation in diameter.     Pericardium  There is no pericardial effusion.     ______________________________________________________________________________  MMode/2D Measurements & Calculations  IVSd: 1.3 cm  LVIDd: 6.2 cm  LVIDs: 4.6 cm  LVPWd: 1.5 cm  FS: 25.3 %  LV mass(C)d: 398.2 grams  LV mass(C)dI: 164.5 grams/m2     Ao root diam: 3.6 cm  LA dimension: 4.7 cm  asc Aorta Diam: 4.0 cm  LA/Ao: 1.3  LVOT diam: 2.5 cm  LVOT area: 4.8 cm2  Ao root diam index Ht(cm/m): 2.1  Ao root diam index BSA (cm/m2): 1.5  Asc Ao diam index BSA (cm/m2): 1.6  Asc Ao diam index Ht(cm/m): 2.3  LA Volume (BP): 93.3 ml  LA Volume Index (BP): 38.6 ml/m2  RWT: 0.47     TAPSE: 3.3 cm     Doppler Measurements & Calculations  MV E max seamus: 86.5 cm/sec  MV A max seamus: 48.8 cm/sec  MV E/A: 1.8  MV dec slope: 455.2 cm/sec2  MV dec time: 0.19 sec  Ao V2 max: 354.4 cm/sec  Ao max P.0 mmHg  Ao V2 mean: 267.2 cm/sec  Ao mean P.5 mmHg  Ao V2 VTI: 97.8 cm  MARILYN(I,D): 0.95 cm2  MARILYN(V,D): 1.1 cm2  LV V1 max P.5 mmHg  LV V1 max: 79.3 cm/sec  LV V1 VTI: 19.5 cm  SV(LVOT): 93.4 ml  SI(LVOT): 38.6 ml/m2  PA acc time: 0.15 sec  AV Seamus Ratio (DI): 0.22  MARILYN Index (cm2/m2): 0.39  E/E' av.9  Lateral E/e': 8.8  Medial E/e': 18.9  RV S Seamus: 13.9 cm/sec      ______________________________________________________________________________  Report approved by: Laurie Pereira 08/05/2024 12:54 PM         Cardiac Catheterization    Narrative    Moderately elevated right heart filling pressures (mean RA 14 mmHg)  Normal left heart filling pressures (wedge 15 mmHg)  Mild pulmonary hypertension       *Note: Due to a large number of results and/or encounters for the requested time period, some results have not been displayed. A complete set of results can be found in Results Review.       Discharge Medications   Current Discharge Medication List        START taking these medications    Details   bumetanide (BUMEX) 1 MG tablet Take 3 tablets (3 mg) by mouth 2 times daily  Qty: 180 tablet, Refills: 0    Comments: Future refills by PCP Dr. Ranjan Courtney with phone number [phone].  Associated Diagnoses: Acute on chronic congestive heart failure, unspecified heart failure type (H)      chlorthalidone (HYGROTON) 25 MG tablet Take 1 tablet (25 mg) by mouth daily  Qty: 30 tablet, Refills: 0    Comments: Future refills by PCP Dr. Ranjan Courtney with phone number [phone].  Associated Diagnoses: Acute on chronic congestive heart failure, unspecified heart failure type (H)      miconazole (MICATIN) 2 % external powder Apply topically 2 times daily for 7 days  Qty: 85 g, Refills: 0    Associated Diagnoses: Yeast infection of the skin      sotalol (BETAPACE) 80 MG tablet Take 1 tablet (80 mg) by mouth every 12 hours  Qty: 60 tablet, Refills: 0    Comments: Future refills by PCP Dr. Ranjan oCurtney with phone number [phone].  Associated Diagnoses: Persistent atrial fibrillation (H)           CONTINUE these medications which have CHANGED    Details   losartan (COZAAR) 25 MG tablet Take 1 tablet (25 mg) by mouth daily  Qty: 30 tablet, Refills: 0    Comments: Future refills by PCP Dr. Ranjan Courtney with phone number [phone].  Associated Diagnoses: Essential hypertension            CONTINUE these medications which have NOT CHANGED    Details   allopurinol (ZYLOPRIM) 300 MG tablet Take 1 tablet (300 mg) by mouth daily  Qty: 90 tablet, Refills: 4    Associated Diagnoses: Gout of foot, unspecified cause, unspecified chronicity, unspecified laterality      cetirizine (ZYRTEC) 10 MG tablet Take 10 mg by mouth every evening      finasteride (PROPECIA) 1 MG tablet Take 1 tablet (1 mg) by mouth daily  Qty: 90 tablet, Refills: 4    Associated Diagnoses: Alopecia      potassium chloride pushpa ER (KLOR-CON M20) 20 MEQ CR tablet Take 3 tablets (60 mEq) by mouth 2 times daily  Qty: 270 tablet, Refills: 2    Associated Diagnoses: Hypokalemia; Benign essential hypertension      psyllium (METAMUCIL/KONSYL) capsule Take 1-2 capsules by mouth daily      rivaroxaban ANTICOAGULANT (XARELTO ANTICOAGULANT) 20 MG TABS tablet Take 1 tablet (20 mg) by mouth daily (with dinner)  Qty: 90 tablet, Refills: 3    Associated Diagnoses: Persistent atrial fibrillation (H)      spironolactone (ALDACTONE) 25 MG tablet Take 1 tablet (25 mg) by mouth daily  Qty: 90 tablet, Refills: 2    Associated Diagnoses: Essential hypertension           STOP taking these medications       metoprolol tartrate (LOPRESSOR) 25 MG tablet Comments:   Reason for Stopping:         predniSONE (DELTASONE) 5 MG tablet Comments:   Reason for Stopping:         sulfamethoxazole-trimethoprim (BACTRIM DS) 800-160 MG tablet Comments:   Reason for Stopping:         torsemide (DEMADEX) 20 MG tablet Comments:   Reason for Stopping:             Allergies   Allergies   Allergen Reactions    Amiodarone Shortness Of Breath     Suspected amiodarone toxicity involving lungs

## 2024-08-09 NOTE — CONSULTS
[facility]    Pulmonology Consultation     Date of Admission:  7/25/2024  Date of Consult (When I saw the patient): 08/09/24    Assessment & Plan   Mike Schultz is a 73 year old male who was admitted on 7/25/2024. I was asked to see the patient for follow-up of pneumonitis.    #History of amiodarone pneumonitis  #History of bilateral groundglass opacities with concern for pneumonitis in May 2024 admission  #BOWEN on CPAP  # Decompensated HFpEF.  #Acute hypoxic respiratory failure, resolved.  # Aortic stenosis.  # Persistent A-fib, currently in sinus s/p DC cardioversion.  # ORBIN on CKD    -From the pulmonology perspective, shortness of breath is significantly better with diuresis.  Currently, have low suspicion of pneumonitis contributing to his current clinical condition.  - I will communicate to his primary pulmonologist Dr. Valles to move his outpatient appointment post discharge.  He should get a repeat high-resolution CT scan and PFTs at that time.  Deferring CT at this time as the patient is still getting diuresed and might have abnormal CT findings/GGO's from fluid overload.  - Okay to discontinue his oral prednisone at this time.  - Management for his A-fib as well as CHF per cardiology and the primary team.  - Patient is already off amiodarone.  - Continue CPAP for his BOWEN.  - Rest of the plan as per the primary team.    Pulmonary service will sign off.  Please feel free to reconsult if his clinical condition changes.    LUIS Cruz   of Medicine  [facility]  Pulmonary, Allergy, Critical Care and Sleep Medicine  Pager - [phone]      William Peters MD, MD    Text page  Securely message with the Vocera Web Console (learn more here)    Code Status    Full Code    Reason for Consult   Reason for consult: Pneumonitis follow-up    Primary Care Physician   Ranjan Courtney    Chief Complaint   Shortness of breath    History is obtained from the  patient    History of Present Illness   Mike Schultz is a 73 year old male who was admitted on 7/25/2024 with worsening shortness of breath.  He has significant past medical history of HFpEF, A-fib with prior failed ablations and cardioversions, history of amiodarone induced pneumonitis on prednisone taper on admission, BOWEN.  He was admitted for ongoing decompensated heart failure with significant fluid overload.    Over the course of his hospitalization, he was admitted and started on IV diuresis with significant improvement in his admission weight of 310 pounds which was significantly more than his dry weight of around 265-270.  He has had remarkable improvement with diuresis and continues on Bumex drip.  Had right heart cath done on 7/31 which showed moderately elevated right heart filling pressures with wedge pressure of 15.  Also s/p DC cardioversion to normal sinus rhythm and is being followed by cardiology closely.    As for his pulmonary history, he was admitted in May with previous admission in March where he was believed to be having amiodarone induced pneumonitis.  Amiodarone was discontinued.  In May, he was believed to have possible relapse of an inflammatory process in the lungs with undiagnosed pneumonitis and was discharged on a prednisone taper along with Bactrim.  He was supposed to see Dr. Valles on 8/8 with repeat high-resolution CT and PFTs which he could not attend due to him being in the hospital.  Pulmonary was consulted for managing his plan further.    Today, he reports significant improvement in his leg edema and consequently, his shortness of breath.  He reports gaining significant amount of fluid on higher doses of prednisone which is now getting better.  His prednisone has been weaned down to 5 mg daily currently.  The ILD serological panel ordered during the last admission has been negative.    Past Medical History   I have reviewed this patient's medical history and updated it with  pertinent information if needed.   Past Medical History:   Diagnosis Date    (HFpEF) heart failure with preserved ejection fraction (H)     Acute gouty arthritis     Alcohol use disorder in remission     Amiodarone pulmonary toxicity     Aortic stenosis     Basal cell carcinoma     Of the nose    Bell's palsy     Herpes simplex without mention of complication     Hyperlipidemia     Hypertension 2000    Persistent atrial fibrillation (H)     persistent, DCCV 12/2017, ablation 11/6/17    Pulmonary nodules     Sleep apnea     CPAP       Past Surgical History   I have reviewed this patient's surgical history and updated it with pertinent information if needed.  Past Surgical History:   Procedure Laterality Date    ABLATION OF DYSRHYTHMIC FOCUS  2018, 04/12/2019    Procedure: EP Ablation Focal AFIB;  Surgeon: Fady Day MD;  Location:  HEART CARDIAC CATH LAB    ACHILLES TENDON SURGERY  1997    ANESTHESIA CARDIOVERSION N/A 2/26/2019    Procedure: ANESTHESIA CARDIOVERSION (CONNER);  Surgeon: GENERIC ANESTHESIA PROVIDER;  Location:  OR    ANESTHESIA CARDIOVERSION N/A 11/19/2020    Procedure: ANESTHESIA, FOR CARDIOVERSION (dr campbell);  Surgeon: GENERIC ANESTHESIA PROVIDER;  Location:  OR    ANESTHESIA CARDIOVERSION N/A 8/22/2022    Procedure: CARDIOVERSION;  Surgeon: GENERIC ANESTHESIA PROVIDER;  Location:  OR    ANESTHESIA CARDIOVERSION N/A 9/13/2023    Procedure: Anesthesia cardioversion;  Surgeon: GENERIC ANESTHESIA PROVIDER;  Location:  OR    ANESTHESIA CARDIOVERSION N/A 10/6/2023    Procedure: Anesthesia cardioversion;  Surgeon: GENERIC ANESTHESIA PROVIDER;  Location:  OR    ANESTHESIA CARDIOVERSION N/A 8/1/2024    Procedure: Anesthesia cardioversion;  Surgeon: GENERIC ANESTHESIA PROVIDER;  Location:  OR    BASAL CELL CARCINOMA EXCISION Right 2009    nose, took cartilage from inner ear.    CARDIOVERSION  2012, 2019    cardioversion for atr fib    COLONOSCOPY  2008    COLONOSCOPY N/A 3/22/2023     Procedure: COLONOSCOPY, FLEXIBLE, WITH LESION REMOVAL USING SNARE;  Surgeon: Anne Lucero MD;  Location:  GI    CV RIGHT HEART CATH MEASUREMENTS RECORDED N/A 7/31/2024    Procedure: Right Heart Cath;  Surgeon: Adriano Lazo MD;  Location:  HEART CARDIAC CATH LAB    EP ABLATION FOCAL AFIB N/A 4/12/2019    Procedure: EP Ablation Focal AFIB;  Surgeon: Fady Day MD;  Location:  HEART CARDIAC CATH LAB    EP ABLATION FOCAL AFIB N/A 5/2/2024    Procedure: Ablation Atrial Fibrilation;  Surgeon: Fady Day MD;  Location:  HEART CARDIAC CATH LAB    HERNIA REPAIR, UMBILICAL  08/20/2012    Procedure: HERNIORRHAPHY UMBILICAL;  UMBILICAL HERNIA REPAIR;  Surgeon: Ra Crocker MD;  Location: [facility]    HERNIORRHAPHY UMBILICAL  8/20/2012    Procedure: HERNIORRHAPHY UMBILICAL;  UMBILICAL HERNIA REPAIR;  Surgeon: Ra Crocker MD;  Location: [facility]    ORTHOPEDIC SURGERY      ORTHOPEDIC SURGERY      OTHER SURGICAL HISTORY Right 03/2017    total KNEE ARTHROSCOPY     TOTAL KNEE ARTHROPLASTY Left 03/16/2018    Z NONSPECIFIC PROCEDURE  1997    achilles tendon    Z NONSPECIFIC PROCEDURE      knees, arthroscopy    ZZC NONSPECIFIC PROCEDURE      basal cell skin ca, nose    ZZC NONSPECIFIC PROCEDURE      flex sig; colonoscopy due 3/2008    ZZC NONSPECIFIC PROCEDURE  2012    cardioversion for atr fib    C TOTAL KNEE ARTHROPLASTY Left 03/16/2018    Dr. Malick Suarez       Prior to Admission Medications   Prior to Admission Medications   Prescriptions Last Dose Informant Patient Reported? Taking?   allopurinol (ZYLOPRIM) 300 MG tablet 7/25/2024  No Yes   Sig: Take 1 tablet (300 mg) by mouth daily   cetirizine (ZYRTEC) 10 MG tablet 7/24/2024  Yes Yes   Sig: Take 10 mg by mouth every evening   finasteride (PROPECIA) 1 MG tablet 7/25/2024  No Yes   Sig: Take 1 tablet (1 mg) by mouth daily   losartan (COZAAR) 100 MG tablet 7/25/2024  Yes Yes   Sig: Take 50 mg by mouth daily   metoprolol tartrate  (LOPRESSOR) 25 MG tablet 7/25/2024 at am x1  No Yes   Sig: Take 0.5 tablets (12.5 mg) by mouth 2 times daily   potassium chloride pushpa ER (KLOR-CON M20) 20 MEQ CR tablet 7/25/2024 at am x 40 meq  No Yes   Sig: Take 3 tablets (60 mEq) by mouth 2 times daily   predniSONE (DELTASONE) 5 MG tablet 7/25/2024 at 10 mg  No Yes   Sig: Take 8 tablets (40 mg) by mouth daily for 10 days, THEN 4 tablets (20 mg) daily for 30 days, THEN 2 tablets (10 mg) daily for 45 days, THEN 1 tablet (5 mg) daily for 60 days.   psyllium (METAMUCIL/KONSYL) capsule 7/24/2024  Yes Yes   Sig: Take 1-2 capsules by mouth daily   rivaroxaban ANTICOAGULANT (XARELTO ANTICOAGULANT) 20 MG TABS tablet 7/24/2024 at pm  No Yes   Sig: Take 1 tablet (20 mg) by mouth daily (with dinner)   spironolactone (ALDACTONE) 25 MG tablet 7/25/2024 at am  No Yes   Sig: Take 1 tablet (25 mg) by mouth daily   sulfamethoxazole-trimethoprim (BACTRIM DS) 800-160 MG tablet 7/25/2024  No Yes   Sig: Take 1 tablet by mouth daily while on steroids   torsemide (DEMADEX) 20 MG tablet 7/25/2024 at am  No Yes   Sig: Take 4 tablets (80 mg) by mouth daily      Facility-Administered Medications: None     Allergies   Allergies   Allergen Reactions    Amiodarone Shortness Of Breath     Suspected amiodarone toxicity involving lungs       Social History   I have reviewed this patient's social history and updated it with pertinent information if needed. Mike Schultz  reports that he quit smoking about 12 years ago. His smoking use included cigarettes. He started smoking about 47 years ago. He has a 34.6 pack-year smoking history. He has been exposed to tobacco smoke. He has never used smokeless tobacco. He reports that he does not currently use alcohol. He reports that he does not use drugs.    Family History   I have reviewed this patient's family history and updated it with pertinent information if needed.   Family History   Problem Relation Age of Onset    Family History Negative Mother  "    Heart Disease Father         decesased at 42 - MI    Heart Disease Sister          MI    Lipids Sister     Lipids Sister     No Known Problems Sister     Heart Disease Brother          MI    Heart Disease Brother         CABG AT 49    Lung Cancer No family hx of        Review of Systems   The 10 point Review of Systems is negative other than noted in the HPI or here.     Physical Exam   Temp:  [97.7  F (36.5  C)-98.5  F (36.9  C)] 98  F (36.7  C)  Pulse:  [51-79] 51  Resp:  [16] 16  BP: (104-135)/(67-95) 123/77  SpO2:  [91 %-98 %] 93 %    Intake/Output Summary (Last 24 hours) at 2024 0852  Last data filed at 2024 0842  Gross per 24 hour   Intake 1510 ml   Output 3580 ml   Net -2070 ml     Constitutional:   Awake, alert and in no apparent distress     Eyes:   nonicteric     ENT:    oral mucosa moist without lesions     Neck:   Supple without supraclavicular or cervical lymphadenopathy     Lungs:   Good air flow.  No crackles. No rhonchi.  No wheezes.     Cardiovascular:   Normal S1 and S2.  RRR.  No murmur, gallop or rub.     Abdomen:   NABS, soft, nontender, nondistended.  No HSM.     Musculoskeletal:   No edema     Neurologic:   Alert and conversant.     Skin:   Warm, dry.  No rash on limited exam.            Latest Ref Rng & Units 3/7/2024     8:54 AM   PFT   FVC L 3.15    FEV1 L 2.42    FVC% % 89    FEV1% % 90        All cultures:  No results for input(s): \"CULT\" in the last 168 hours.    Imaging:              Data   Results for orders placed or performed during the hospital encounter of 24 (from the past 24 hour(s))   Potassium   Result Value Ref Range    Potassium 4.1 3.4 - 5.3 mmol/L   Basic metabolic panel   Result Value Ref Range    Sodium 141 135 - 145 mmol/L    Potassium 3.9 3.4 - 5.3 mmol/L    Chloride 101 98 - 107 mmol/L    Carbon Dioxide (CO2) 34 (H) 22 - 29 mmol/L    Anion Gap 6 (L) 7 - 15 mmol/L    Urea Nitrogen 27.4 (H) 8.0 - 23.0 mg/dL    Creatinine 1.27 (H) 0.67 - " 1.17 mg/dL    GFR Estimate 60 (L) >60 mL/min/1.73m2    Calcium 9.0 8.8 - 10.4 mg/dL    Glucose 94 70 - 99 mg/dL   EKG 12-lead, tracing only   Result Value Ref Range    Systolic Blood Pressure  mmHg    Diastolic Blood Pressure  mmHg    Ventricular Rate 54 BPM    Atrial Rate 54 BPM    NY Interval 192 ms    QRS Duration 74 ms     ms    QTc 515 ms    P Axis 61 degrees    R AXIS 34 degrees    T Axis 100 degrees    Interpretation ECG       Sinus bradycardia  Nonspecific ST and T wave abnormality  Abnormal ECG  When compared with ECG of 08-Aug-2024 07:15,  Premature atrial complexes are no longer Present  ST now depressed in Inferior leads       *Note: Due to a large number of results and/or encounters for the requested time period, some results have not been displayed. A complete set of results can be found in Results Review.       I personally spent 80 minutes on the date of the encounter doing chart review, history and exam, documentation and further activities per the note.       Pulmonary will continue to follow.       LUIS Cruz   of Medicine  [facility]  Pulmonary, Allergy, Critical Care and Sleep Medicine  Pager - [phone]

## 2024-08-09 NOTE — CONSULTS
SPIRITUAL HEALTH SERVICES  SPIRITUAL ASSESSMENT Consult Note  [facility]     REFERRAL SOURCE: Consult for emotional support     Reviewed documentation. Reflective conversation shared with Brayden which integrated elements of illness and family narratives.     Brayden shared that he is hopeful about being able to discharge in the next day or two. Brayden expressed that he and his family continue to experience distress and loss following the death of his daughter recently.     I offered spiritual and emotional support through reflective listening that affirmed emotions, experience, and meaning.     PLAN: Spiritual Health remains available for support. Please consult as needs arise.    Orin Julio  Associate      [facility] available 24/7 for emergent requests/referrals, either by paging the on-call  or by entering an ASAP/STAT consult in Epic (this will also page the on-call ).

## 2024-08-09 NOTE — PROGRESS NOTES
[facility]    ~Cardiology Progress Note~    Primary Cardiologist: Dr. Day with EP; Dr. Zuleta with general cardiology     Date of Admission: 7/25/2024  Service Date: 08/09/2024    Summary:  Mr. Mike Schultz is a very pleasant 73 year old male with a past medical history of persistent atrial fibrillation, HFpEF, moderate aortic stenosis, hypertension, BOWEN, alcohol use disorder, Bell's palsy, lung nodules who was admitted from the EP clinic after no response after infusion clinic on 7/24/2024.     Interval August 9, 2024:  Normotensive. Net -2.0 within 24 hours. K 3.9, Creatinine 1.27 today. Responding well to Bumex gtt. Eager to discharge. Denies chest pain, shortness of breath, lightheadedness, and palpitations.    Telemetry reviewed- sinus bradycardia         Assessment:     Acute on chronic diastolic heart failure, likely triggered by atrial fibrillation   -LVEF: 55% noted on echo 5/2024  -Fluid Status: hypervolemic, discharge weight from previous admission in 5/2024 was 278#  -Diuretic Regimen: PTA torsemide 80 mg daily   -Ischemic Eval: lexiscan stress test 5/2024 noted below   -GDMT PTA:  Beta blocker: metoprolol tartrate 12.5 mg BID   ACEI/ARB/ARNI: losartan 50 mg daily   Aldactone antagonist: spironolactone 25 mg daily   SGLT2 inhibitor: none   -RHC on 7/31: showed moderate elevated right heart filling pressures, mean RA 14 mmHg, normal left heart filling pressures, wedge 15 mmHg, mild pulmonary hypertension      Persistent atrial fibrillation with multiple attempts at rhythm control, cardioversions, and ablation attempts  [id number] Score 4 (age, CHF, HTN, aortic plaque)  Anticoagulated with Xarelto   S/p DCCV on 8/1/2024 with restoration of SR, on sotalol     Moderate-severe aortic stenosis, mean gradient 31 mmHg, echo 8/5     Ascending aorta dilatation, measuring 4.0 cm, echo 8/5    History of abnormal nuclear stress test during admission 5/2024  Lexiscan stress test  5/2024- abnormal, small area of ischemia in the apical segments of the left ventricle, small area of nontransmural infarction in the basal inferolateral segments of the left ventricle associated with a mild degree of iva-infarct ischemia involving basal to mid inferior wall   This was thought to possibly be a false positive finding due to body habitus, but recommendation was a left and right heart cath when patients pulmonary status returned to baseline     HTN   BOWEN, compliant with CPAP   Myocardial scar noted on cardiac MRI   History of alcohol dependence            Plan:     Bumex 2 mg IV bolus this AM and PM, in addition to Bumx gtt today.   Continue Bumex gtt at 0.25 mg/hr   Decrease potassium chloride to 40 mEq TID   Continue the following cardiology medication regimen   Losartan 25 mg daily   Xarelto 20 mg daily   Spironolactone 25 mg daily   Anticipate transition to oral Bumex 3 mg BID tomorrow   Daily standing weight, strict I/O, daily BMP and repletion of electrolytes   Pharmacy liaison consult for jardiance. Can discuss in the outpatient setting   Recommend coronary angiogram in the outpatient setting   Follow up with EP on September 10th. Recommend additional outpatient appointment with general cardiology. Patient saw Dr. Zuleta in 2023  Patient cleared to discharge today after afternoon Bumex bolus.   Cardiology to sign off       Plan of care was formulated under the direction and guidance of Dr. Pérez.         Genet Desouza PA-C  Physician Assistant   [facility]  Pager: [phone]      25 minutes spent in coordination of care, and discussion with the patient and/or family regarding diagnostic results, prognosis, symptom management, risks and benefits of management options, and development of the plan of care of above.        Patient Active Problem List   Diagnosis    Essential hypertension    Impotence of organic origin    Allergic rhinitis    Herpes simplex virus (HSV) infection     Class 3 obesity (H)    Tobacco use disorder, moderate, in sustained remission    Atrial fibrillation, permanent (H)    Family history of ischemic heart disease    Gout    BOWEN (obstructive sleep apnea)    Ascending aorta dilatation (H24)    Impaired fasting glucose    Aortic valve stenosis    Venous insufficiency    Bilateral carpal tunnel syndrome    Other cardiomyopathies (H)    Leg wound, right    Lower extremity edema    Acute respiratory failure with hypoxia (H)    Acute on chronic congestive heart failure, unspecified heart failure type (H)    Acute decompensated heart failure (H)       Physical Exam   Temp: 98  F (36.7  C) Temp src: Oral BP: 123/77 Pulse: 51   Resp: 16 SpO2: 93 % O2 Device: None (Room air)    Vitals:    08/08/24 0543 08/08/24 0800 08/09/24 0618   Weight: 113.4 kg (249 lb 14.4 oz) 133.3 kg (293 lb 14.4 oz) 133.8 kg (294 lb 14.4 oz)     I/O last 3 completed shifts:  In: 1180 [P.O.:1170; I.V.:10]  Out: 3200 [Urine:3200]    Constitutional: Appears stated age, well nourished, NAD.  Neck: Supple.  JVD not visualized.   Respiratory: Non-labored. Lungs CTAB.  Cardiovascular: RRR, with holosystolic murmur. Bilateral lower extremities with 2+ edema.   GI: Soft, non-distended, non-tender.  Skin: Warm and dry.   Musculoskeletal/Extremities: Symmetrical movement. Lymphedema wear to BLE.   Neurologic: No gross focal deficits. Alert, awake.  Psychiatric: Affect appropriate. Mentation normal.    Medications   Current Facility-Administered Medications   Medication Dose Route Frequency Provider Last Rate Last Admin    - MEDICATION INSTRUCTIONS -   Does not apply DOES NOT GO TO Courtney Blackmon APRN CNP        bumetanide (BUMEX) 0.25 mg/mL infusion  0.25 mg/hr Intravenous Continuous Yasir Pérez MD 1 mL/hr at 08/07/24 1338 0.25 mg/hr at 08/07/24 1338    Continuing ACE inhibitor/ARB/ARNI from home medication list OR ACE inhibitor/ARB/ARNI order already placed during this visit   Does not apply DOES  NOT GO TO Courtney Blackmon APRN CNP        Continuing beta blocker from home medication list OR beta blocker order already placed during this visit   Does not apply DOES NOT GO TO Courtney Blackmon APRN CNP        Give 1/2 of usual dose of LONG ACTING insulin AM of procedure IF diabetic   Does not apply Continuous PRN Shanthi Wilkins APRN CNP        May take oral meds with sip of water, the morning of Cardioversion procedure.   Does not apply Continuous PRN Sahnthi Wilkins APRN CNP        Patient is already receiving anticoagulation with heparin, enoxaparin (LOVENOX), warfarin (COUMADIN)  or other anticoagulant medication   Does not apply Continuous PRN Courtney Roy APRN CNP         Current Facility-Administered Medications   Medication Dose Route Frequency Provider Last Rate Last Admin    allopurinol (ZYLOPRIM) tablet 300 mg  300 mg Oral Daily Courtney Roy APRN CNP   300 mg at 08/08/24 0751    bumetanide (BUMEX) injection 2 mg  2 mg Intravenous Once Yasir Pérez MD        cetirizine (zyrTEC) tablet 10 mg  10 mg Oral QPM Courtney Roy APRN CNP   10 mg at 08/08/24 2038    finasteride (PROPECIA) tablet 1 mg  1 mg Oral Daily Courtney Roy APRN CNP   1 mg at 08/08/24 0751    losartan (COZAAR) tablet 25 mg  25 mg Oral Daily Parveen Jj MD   25 mg at 08/08/24 0751    miconazole (MICATIN) 2 % powder   Topical BID Masha Moy MD   Given at 08/08/24 2039    polyethylene glycol (MIRALAX) Packet 17 g  17 g Oral Daily Courtney Roy APRN CNP   17 g at 08/08/24 0751    potassium chloride pushpa ER (KLOR-CON M20) CR tablet 40 mEq  40 mEq Oral TID Yasir Pérez MD        predniSONE (DELTASONE) tablet 5 mg  5 mg Oral Daily Courtney Roy APRN CNP        psyllium (METAMUCIL/KONSYL) capsule 1-2 capsule  1-2 capsule Oral Daily Courtney Roy APRN CNP   1 capsule at 08/08/24 0751    rivaroxaban ANTICOAGULANT (XARELTO) tablet 20 mg  20 mg Oral Daily with Courtney Chauhan,  AYAN CNP   20 mg at 24 1650    sodium chloride (PF) 0.9% PF flush 3 mL  3 mL Intracatheter Q8H Courtney Roy APRN CNP   3 mL at 24 0923    sotalol (BETAPACE) tablet 80 mg  80 mg Oral Q12H ONEAL () Natalie Campos PA-C   80 mg at 24 2211    spironolactone (ALDACTONE) tablet 25 mg  25 mg Oral Daily Narendra Thomas MD   25 mg at 24 0751       Data   Last 24 hours labs personally reviewed.  Echo:   Recent Results (from the past 4320 hour(s))   Echocardiogram Complete   Result Value    LVEF  55-60%    Narrative    [id number]  Formerly [facility]  [id number]  [id number]^REINA^LAURA^TRAVIS     [facility]  Echocardiography Laboratory  [address] [place] [id number]     Name: TOO HOWARD  MRN: [id number]  : 1950  Study Date: 2024 10:15 AM  Age: 73 yrs  Gender: Male  Patient Location: [facility]  Reason For Study: Aortic Valve Disorder  Ordering Physician: LAURA HUANG  Referring Physician: Ranjan Courtney,  Performed By: Norah Suarez     BSA: 2.4 m2  Height: 68 in  Weight: 298 lb  HR: 66  BP: 122/76 mmHg  ______________________________________________________________________________  Procedure  Complete Portable Echo Adult. Optison (NDC #0804-2970) given intravenously.  ______________________________________________________________________________  Interpretation Summary     Left ventricular systolic function is normal.  The visual ejection fraction is 55-60%.  There is moderate concentric left ventricular hypertrophy.  The right ventricle is normal in structure, function and size.  Regional wall motion abnormalities cannot be excluded due to limited  visualization despite use of contrast.  Calcified aortic valve. Vmax 3.5 m/sec, mean gradient 31 mmHg, MARILYN 0.9 cm2 by  continuity equation. DVI 0.22. Findings consistent with moderate-severe aortic  stenosis. Doppler interrogation is at a borderline reduced stroke volume index  (38  ml/m2).  Dilation of the inferior vena cava is present with abnormal respiratory  variation in diameter.  There is no pericardial effusion.  Ascending aorta dilatation is present (4.0 cm).     Compared to study dated 5/6/2024, there is slight progression of aortic  stenosis however interstudy variabilty in Doppler interrogation is possible  given poor acoustic windows. Otherwise findings are similar.  ______________________________________________________________________________  Left Ventricle  The left ventricle is normal in size. There is moderate concentric left  ventricular hypertrophy. Left ventricular systolic function is normal. The  visual ejection fraction is 55-60%. Left ventricular diastolic function is  indeterminate. Regional wall motion abnormalities cannot be excluded due to  limited visualization.     Right Ventricle  The right ventricle is normal in structure, function and size.     Atria  The left atrium is mildly dilated. Right atrial size is normal.     Mitral Valve  There is mild to moderate mitral annular calcification. There is mild (1+)  mitral regurgitation.     Tricuspid Valve  The tricuspid valve is not well visualized. Right ventricular systolic  pressure could not be approximated due to inadequate tricuspid regurgitation.     Aortic Valve  The aortic valve is not well visualized. Moderate valvular aortic stenosis.  Vmax 3.5 m/sec, mean gradient 31 mmHg, MARILYN 0.9 cm2 by continuity equation. DVI  0.22. Findings consistent with moderate-severe aortic stenosis. Doppler  interrogation is at a borderline reduced stroke volume index (38 ml/m2).     Pulmonic Valve  The pulmonic valve is normal in structure and function. There is trace  pulmonic valvular regurgitation.     Vessels  The aortic root is normal size. Ascending aorta dilatation is present.  Dilation of the inferior vena cava is present with abnormal respiratory  variation in diameter.     Pericardium  There is no pericardial  effusion.     ______________________________________________________________________________  MMode/2D Measurements & Calculations  IVSd: 1.3 cm  LVIDd: 6.2 cm  LVIDs: 4.6 cm  LVPWd: 1.5 cm  FS: 25.3 %  LV mass(C)d: 398.2 grams  LV mass(C)dI: 164.5 grams/m2     Ao root diam: 3.6 cm  LA dimension: 4.7 cm  asc Aorta Diam: 4.0 cm  LA/Ao: 1.3  LVOT diam: 2.5 cm  LVOT area: 4.8 cm2  Ao root diam index Ht(cm/m): 2.1  Ao root diam index BSA (cm/m2): 1.5  Asc Ao diam index BSA (cm/m2): 1.6  Asc Ao diam index Ht(cm/m): 2.3  LA Volume (BP): 93.3 ml  LA Volume Index (BP): 38.6 ml/m2  RWT: 0.47     TAPSE: 3.3 cm     Doppler Measurements & Calculations  MV E max seamus: 86.5 cm/sec  MV A max seamus: 48.8 cm/sec  MV E/A: 1.8  MV dec slope: 455.2 cm/sec2  MV dec time: 0.19 sec  Ao V2 max: 354.4 cm/sec  Ao max P.0 mmHg  Ao V2 mean: 267.2 cm/sec  Ao mean P.5 mmHg  Ao V2 VTI: 97.8 cm  MARILYN(I,D): 0.95 cm2  MARILYN(V,D): 1.1 cm2  LV V1 max P.5 mmHg  LV V1 max: 79.3 cm/sec  LV V1 VTI: 19.5 cm  SV(LVOT): 93.4 ml  SI(LVOT): 38.6 ml/m2  PA acc time: 0.15 sec  AV Seamus Ratio (DI): 0.22  MARILYN Index (cm2/m2): 0.39  E/E' av.9  Lateral E/e': 8.8  Medial E/e': 18.9  RV S Seamus: 13.9 cm/sec     ______________________________________________________________________________  Report approved by: Laurie Pereira 2024 12:54 PM         Echocardiogram Complete   Result Value    LVEF  55%    Narrative    [id number]  [id number]  [id number]  [id number]^GAMALIEL^JOAN^KIYA     [facility]  Echocardiography Laboratory  [address] [place] [address]     Name: TOO HOWARD  MRN: [id number]  : 1950  Study Date: 2024 11:48 AM  Age: 73 yrs  Gender: Male  Patient Location: [facility]  Reason For Study: Heart Failure  Ordering Physician: JOAN ALSTON  Referring Physician: Ranjan Courtney  Performed By: Norah Suarez     BSA: 2.4 m2  Height: 68 in  Weight: 291 lb  HR: 75  BP: 110/80  mmHg  ______________________________________________________________________________  Procedure  Complete Portable Echo Adult.  ______________________________________________________________________________  Interpretation Summary     Technically challenging study due to patient body habitus.     The left ventricle is borderline dilated. There is mild concentric left  ventricular hypertrophy.  Left ventricular systolic function is low normal. The visual ejection fraction  is estimated at 55%. Regional wall motion challenging to assess due to  frequent ectopy/irregular R-R intervals however no obvious regional wall  motion abnormalities seen.  The right ventricle is borderline dilated. The right ventricular systolic  function is normal.  Moderate aortic stenosis, Vmax 3.7 m/s, mean gradient 35 mmHg, MARILYN 1.1cm2, DI  0.26. SVi 44 cc/m2. There is mild (1+) aortic regurgitation. Aortic valve  morphology is not well visualized, however it is heavily calcified.  There is no pericardial effusion.  The inferior vena cava was normal in size with preserved respiratory  variability.     This study was compared to a TTE from 2/11/2024. Global LV systolic function  is marginally less dynamic on this present study.  ______________________________________________________________________________  Left Ventricle  The left ventricle is borderline dilated. There is mild concentric left  ventricular hypertrophy. Left ventricular systolic function is low normal. The  visual ejection fraction is estimated at 55%. Grade III or advanced diastolic  dysfunction. Regional wall motion challenging to assess due to frequent  ectopy/irregular R-R intervals however no obvious regional wall motion  abnormalities seen.     Right Ventricle  The right ventricle is borderline dilated. The right ventricular systolic  function is normal.     Atria  The left atrium is moderately dilated. Right atrial size is normal.     Mitral Valve  There is mild mitral  annular calcification. There is mild (1+) mitral  regurgitation.     Tricuspid Valve  The tricuspid valve is not well visualized, but is grossly normal.     Aortic Valve  Aortic valve morphology is not well visualized, however it is heavily  calcified. There is mild (1+) aortic regurgitation. Moderate aortic stenosis,  Vmax 3.7 m/s, mean gradient 35 mmHg, MARILYN 1.1cm2, DI 0.26. SVi 44 cc/m2.     Pulmonic Valve  The pulmonic valve is not well seen, but is grossly normal.     Vessels  The aortic root is normal size. Ascending aorta dilatation is present. The  inferior vena cava was normal in size with preserved respiratory variability.     Pericardium  There is no pericardial effusion.     Rhythm  The rhythm was undetermined.  ______________________________________________________________________________  MMode/2D Measurements & Calculations  IVSd: 1.1 cm     LVIDd: 5.9 cm  LVIDs: 3.9 cm  LVPWd: 1.2 cm  FS: 34.8 %  LV mass(C)d: 300.5 grams  LV mass(C)dI: 125.3 grams/m2  Ao root diam: 3.5 cm  LA dimension: 4.8 cm  asc Aorta Diam: 4.0 cm  LA/Ao: 1.3  LVOT diam: 2.4 cm  LVOT area: 4.5 cm2  Ao root diam index Ht(cm/m): 2.1  Ao root diam index BSA (cm/m2): 1.5  Asc Ao diam index BSA (cm/m2): 1.7  Asc Ao diam index Ht(cm/m): 2.3  LA Volume (BP): 111.0 ml     LA Volume Index (BP): 46.3 ml/m2  RWT: 0.42  TAPSE: 1.7 cm     Doppler Measurements & Calculations  MV E max seamus: 115.0 cm/sec  MV A max seamus: 50.9 cm/sec  MV E/A: 2.3  MV dec slope: 682.9 cm/sec2  MV dec time: 0.17 sec  Ao V2 max: 365.0 cm/sec  Ao max P.3 mmHg  Ao V2 mean: 284.5 cm/sec  Ao mean P.5 mmHg  Ao V2 VTI: 93.2 cm  MARILYN(I,D): 1.1 cm2  MARILYN(V,D): 1.2 cm2  AI P1/2t: 415.1 msec  LV V1 max PG: 3.6 mmHg  LV V1 max: 95.2 cm/sec  LV V1 VTI: 23.2 cm  SV(LVOT): 105.1 ml  SI(LVOT): 43.8 ml/m2  PA acc time: 0.08 sec  AV Seamus Ratio (DI): 0.26  MARILYN Index (cm2/m2): 0.47  E/E' av.4     Lateral E/e': 21.2  Medial E/e': 19.5  RV S Seamus: 10.1 cm/sec      ______________________________________________________________________________  Report approved by: Laurie Baires 2024 03:36 PM         Echocardiogram Complete   Result Value    LVEF  55-60%    [facility]    [id number]  [facility]  [id number]  [id number]^DENNISE^EDITA^MIR     [facility]  Echocardiography Laboratory  [address] [place] [address], [place] [address]     Name: TOO HOWARD  MRN: [id number]  : 1950  Study Date: 2024 09:42 AM  Age: 73 yrs  Gender: Male  Patient Location: [facility]  Reason For Study: Heart Failure  Ordering Physician: EDITA BOWDEN  Performed By: Millicent Rosenberg     BSA: 2.4 m2  Height: 68 in  Weight: 295 lb  HR: 92  BP: 130/75 mmHg  ______________________________________________________________________________  Procedure  Complete Portable Echo Adult. Optison (NDC #7201-9500) given intravenously.  ______________________________________________________________________________  Interpretation Summary     The visual ejection fraction is 55-60%.  There is moderate concentric left ventricular hypertrophy.  There is mild (1+) mitral regurgitation.  Moderate valvular aortic stenosis.  Ascending aorta dilatation is present.  The rhythm was atrial fibrillation.  The patient exhibited frequent PVCs.  There is moderate biatrial enlargement.  The study was technically difficult. Contrast was used without apparent  complications. Compared to prior study, changes are noted.  ______________________________________________________________________________  Left Ventricle  The left ventricle is normal in size. There is moderate concentric left  ventricular hypertrophy. The visual ejection fraction is 55-60%. Left  ventricular diastolic function is abnormal.     Right Ventricle  The right ventricle is normal in structure, function and size.     Atria  There is moderate biatrial enlargement.     Mitral Valve  There is mild mitral annular calcification. The mitral valve  leaflets are  mildly thickened. There is mild (1+) mitral regurgitation.     Tricuspid Valve  The tricuspid valve is not well visualized, but is grossly normal.     Aortic Valve  Moderate valvular aortic stenosis.     Pulmonic Valve  The pulmonic valve is not well visualized.     Vessels  The aortic root is normal size. Ascending aorta dilatation is present.     Pericardium  There is no pericardial effusion.     Rhythm  The rhythm was atrial fibrillation. The patient exhibited frequent PVCs.  ______________________________________________________________________________  MMode/2D Measurements & Calculations     IVSd: 1.4 cm  LVIDd: 5.4 cm  LVIDs: 4.3 cm  LVPWd: 1.4 cm  FS: 20.9 %  LV mass(C)d: 327.0 grams  LV mass(C)dI: 135.6 grams/m2  Ao root diam: 3.7 cm  LA dimension: 5.3 cm  asc Aorta Diam: 4.0 cm  LA/Ao: 1.4  LVOT diam: 2.4 cm  LVOT area: 4.4 cm2  Ao root diam index Ht(cm/m): 2.1  Ao root diam index BSA (cm/m2): 1.5  Asc Ao diam index BSA (cm/m2): 1.7  Asc Ao diam index Ht(cm/m): 2.3  LA Volume (BP): 72.2 ml  RV Base: 4.1 cm  RWT: 0.52  TAPSE: 2.4 cm     Doppler Measurements & Calculations  MV E max seamus: 138.4 cm/sec  MV dec slope: 814.1 cm/sec2  MV dec time: 0.17 sec  Ao V2 max: 364.4 cm/sec  Ao max P.0 mmHg  Ao V2 mean: 278.0 cm/sec  Ao mean P.9 mmHg  Ao V2 VTI: 86.1 cm  MARILYN(I,D): 1.0 cm2  MARILYN(V,D): 1.1 cm2  LV V1 max PG: 3.2 mmHg  LV V1 max: 90.0 cm/sec  LV V1 VTI: 20.1 cm  SV(LVOT): 88.7 ml  SI(LVOT): 36.8 ml/m2  PA acc time: 0.09 sec  AV Seamus Ratio (DI): 0.25  MARILYN Index (cm2/m2): 0.43  E/E' av.4  Lateral E/e': 16.8  Medial E/e': 16.0  RV S Seamus: 15.6 cm/sec     ______________________________________________________________________________  Report approved by: Laurie Garcia 2024 01:05 PM

## 2024-08-10 ENCOUNTER — PATIENT OUTREACH (OUTPATIENT)
Dept: CARE COORDINATION | Facility: CLINIC | Age: 74
End: 2024-08-10
Payer: COMMERCIAL

## 2024-08-10 DIAGNOSIS — L65.9 ALOPECIA: ICD-10-CM

## 2024-08-10 LAB
ATRIAL RATE - MUSE: 54 BPM
DIASTOLIC BLOOD PRESSURE - MUSE: NORMAL MMHG
INTERPRETATION ECG - MUSE: NORMAL
P AXIS - MUSE: 61 DEGREES
PR INTERVAL - MUSE: 192 MS
QRS DURATION - MUSE: 74 MS
QT - MUSE: 544 MS
QTC - MUSE: 515 MS
R AXIS - MUSE: 34 DEGREES
SYSTOLIC BLOOD PRESSURE - MUSE: NORMAL MMHG
T AXIS - MUSE: 100 DEGREES
VENTRICULAR RATE- MUSE: 54 BPM

## 2024-08-10 NOTE — PROGRESS NOTES
"[facility]  Transitions of Care Outreach  Chief Complaint   Patient presents with    Clinic Care Coordination - Post Hospital       Most Recent Admission Date: 7/25/2024   Most Recent Admission Diagnosis:      Most Recent Discharge Date: 8/9/2024   Most Recent Discharge Diagnosis: Acute on chronic congestive heart failure, unspecified heart failure type (H) - I50.9  Wound of right lower extremity, initial encounter - S81.801A  Acute decompensated heart failure (H) - I50.9  Persistent atrial fibrillation (H) - I48.19  Essential hypertension - I10  Yeast infection of the skin - B37.2  Hypokalemia - E87.6  Benign essential hypertension - I10     Transitions of Care Assessment    Discharge Assessment  How are you doing now that you are home?: \"So far so good\"  How are your symptoms? (Red Flag symptoms escalate to triage hotline per guidelines): Improved  Do you know how to contact your clinic care team if you have future questions or changes to your health status? : Yes ([facility]W provided main [facility] scheduling phone number for patient to call to reschedule PT appointments)  Does the patient have their discharge instructions? : Yes  Does the patient have questions regarding their discharge instructions? : No  Were you started on any new medications or were there changes to any of your previous medications? : Yes  Does the patient have all of their medications?: Yes  Do you have questions regarding any of your medications? : No  Do you have all of your needed medical supplies or equipment (DME)?  (i.e. oxygen tank, CPAP, cane, etc.): Yes    Post-op (CHW CTA Only)  If the patient had a surgery or procedure, do they have any questions for a nurse?: No    Follow up Plan     Discharge Follow-Up  Discharge follow up appointment scheduled in alignment with recommended follow up timeframe or Transitions of Risk Category? (Low = within 30 days; Moderate= within 14 days; High= within 7 days): Yes (CHW " scheduled)  Discharge Follow Up Appointment Date: 08/23/24  Discharge Follow Up Appointment Scheduled with?: Primary Care Provider  Patient's follow up appointment not scheduled: Patient accepted scheduling support. Appt scheduled/requested per protocol.    Future Appointments   Date Time Provider Department Center   8/23/2024  9:00 AM Bear Fox MD [facility]   9/10/2024  4:15 PM Fady Day MD [facility]   9/30/2024  9:45 AM Maninder Huffman MD [facility] [place]   9/30/2024  3:30 PM [facility]   10/4/2024 10:00 AM [facility]anco [facility]   11/11/2024 10:15 AM Pedro Luis Romeo MD [facility]   11/15/2024 10:15 AM Trevin Zuleta MD [facility]       Outpatient Plan as outlined on AVS reviewed with patient.    For any urgent concerns, please contact our 24 hour nurse triage line: [phone] ([phone])         Joycelyn Fisher  Community Health Worker  Connected Care Resource [facility]    *Connected Care Resource Team does NOT follow patient ongoing. Referrals are identified based on internal discharge reports and the outreach is to ensure patient has an understanding of their discharge instructions.

## 2024-08-12 RX ORDER — FINASTERIDE 1 MG/1
1 TABLET, FILM COATED ORAL DAILY
Qty: 90 TABLET | Refills: 2 | Status: SHIPPED | OUTPATIENT
Start: 2024-08-12

## 2024-08-15 ENCOUNTER — OFFICE VISIT (OUTPATIENT)
Dept: CARDIOLOGY | Facility: CLINIC | Age: 74
End: 2024-08-15
Payer: COMMERCIAL

## 2024-08-15 ENCOUNTER — LAB (OUTPATIENT)
Dept: LAB | Facility: CLINIC | Age: 74
End: 2024-08-15
Payer: COMMERCIAL

## 2024-08-15 VITALS
WEIGHT: 294.4 LBS | HEART RATE: 70 BPM | OXYGEN SATURATION: 95 % | SYSTOLIC BLOOD PRESSURE: 98 MMHG | BODY MASS INDEX: 44.62 KG/M2 | HEIGHT: 68 IN | DIASTOLIC BLOOD PRESSURE: 54 MMHG

## 2024-08-15 DIAGNOSIS — I50.33 ACUTE ON CHRONIC DIASTOLIC CONGESTIVE HEART FAILURE (H): Primary | ICD-10-CM

## 2024-08-15 DIAGNOSIS — Z79.899 ENCOUNTER FOR MONITORING SOTALOL THERAPY: ICD-10-CM

## 2024-08-15 DIAGNOSIS — I48.19 PERSISTENT ATRIAL FIBRILLATION (H): ICD-10-CM

## 2024-08-15 DIAGNOSIS — Z51.81 ENCOUNTER FOR MONITORING SOTALOL THERAPY: ICD-10-CM

## 2024-08-15 DIAGNOSIS — I35.0 AORTIC VALVE STENOSIS, ETIOLOGY OF CARDIAC VALVE DISEASE UNSPECIFIED: ICD-10-CM

## 2024-08-15 DIAGNOSIS — I50.33 ACUTE ON CHRONIC DIASTOLIC CONGESTIVE HEART FAILURE (H): ICD-10-CM

## 2024-08-15 LAB
ANION GAP SERPL CALCULATED.3IONS-SCNC: 12 MMOL/L (ref 7–15)
BUN SERPL-MCNC: 40.4 MG/DL (ref 8–23)
CALCIUM SERPL-MCNC: 9.6 MG/DL (ref 8.8–10.4)
CHLORIDE SERPL-SCNC: 95 MMOL/L (ref 98–107)
CREAT SERPL-MCNC: 1.65 MG/DL (ref 0.67–1.17)
EGFRCR SERPLBLD CKD-EPI 2021: 44 ML/MIN/1.73M2
GLUCOSE SERPL-MCNC: 151 MG/DL (ref 70–99)
HCO3 SERPL-SCNC: 27 MMOL/L (ref 22–29)
NT-PROBNP SERPL-MCNC: 374 PG/ML (ref 0–900)
POTASSIUM SERPL-SCNC: 3.3 MMOL/L (ref 3.4–5.3)
SODIUM SERPL-SCNC: 134 MMOL/L (ref 135–145)

## 2024-08-15 PROCEDURE — 36415 COLL VENOUS BLD VENIPUNCTURE: CPT | Performed by: PHYSICIAN ASSISTANT

## 2024-08-15 PROCEDURE — 83880 ASSAY OF NATRIURETIC PEPTIDE: CPT | Performed by: PHYSICIAN ASSISTANT

## 2024-08-15 PROCEDURE — 80048 BASIC METABOLIC PNL TOTAL CA: CPT | Performed by: PHYSICIAN ASSISTANT

## 2024-08-15 PROCEDURE — 99215 OFFICE O/P EST HI 40 MIN: CPT | Performed by: NURSE PRACTITIONER

## 2024-08-15 PROCEDURE — 93000 ELECTROCARDIOGRAM COMPLETE: CPT | Performed by: NURSE PRACTITIONER

## 2024-08-15 RX ORDER — BUMETANIDE 1 MG/1
TABLET ORAL
Status: SHIPPED
Start: 2024-08-15 | End: 2024-08-20

## 2024-08-15 NOTE — LETTER
8/15/2024    Ranjan Courtney MD  [address] [facility] [address]    RE: Mike Schultz       Dear Colleague,     I had the pleasure of seeing Mike Schultz in the [facility].  Cardiology Clinic Progress Note  Mike Schultz MRN# [id number]   YOB: 1950 Age: 73 year old       HPI:    Mike Schultz is a delightful 73 year old patient with past medical history significant for:    History of persistent atrial fibrillation.  First diagnosed in 2012.  Failed flecainide and noted scar on MRI.  PVI and SVC isolation 4/2017.  Repeat PVI and CTI for inducible flutter 4/2019.  Placed on amiodarone with cardioversion 11/19/2020 for recurrent A-fib in the setting of increased alcohol use.  Amiodarone decreased 1/2021; back in persistent A-fib 12/2021, and amiodarone stopped.  Amiodarone restarted 8/2022 prior to cardioversion 8/22/2022.  Amiodarone stopped again 1/2023.  A-fib 7/2023 after alcohol use with unsuccessful cardioversion 9/13.  Patient loaded with amiodarone with successful cardioversion 10/6/2023.  Repeat A-fib ablation this time without being triggered by alcohol.  Amiodarone stopped 11/2023 due to discovery of pneumonitis.  Brought back to the EP lab by Dr. Day 5/2/2024 for repeat isolation of posterior LA with linear ablation, reisolation of reconnected PVI and empiric CTI ablation for inducible flutter.  Patient initiated on sotalol with cardioversion earlier this month during prolonged hospitalization for HFpEF.  Continues to be in sinus rhythm.  Moderate aortic stenosis evaluated by TAVR.  No intervention at this point on 5/2024.  Hypertension  BOWEN with use of CPAP  Myocardial scar on MRI 10/2017 consistent with prior MI in the left circumflex territory.  EF 51%  History of alcohol use.  On admit patient noted he had few seltzer drinks in the past week prior to admission.  Cessation was encouraged during hospital admission.  Chronic anticoagulation on Xarelto for  [id number] score 4  Bell's palsy  Pneumonitis and history of lung nodules.  Lung nodules did improve, followed by Dr. Mckeon.  Currently off prednisone for pneumonitis.  HFpEF with prolonged hospitalization; admitted 7/25/2024, discharged 8/9/2024.  Patient treated with extensive diuretic therapy (Bumex drip) with assistance from nephrology.  Right heart catheterization 7/31/2024 showed moderate elevated right heart filling pressures.  Normal left heart filling pressures noted, wedge 15 mmHg.  Severe obesity with BMI of 45  BPH  CKD    Discharge weight: 294 pounds  Admission weight: 310 pounds    It is my pleasure seeing Brayden and his wife today for a hospital follow-up in the core clinic.  He had a prolonged hospitalization as outlined above.  His weight has been stable at home at 294 pounds.  In fact, he states he is down 3 pounds on his scale.  In reviewing his diet today and stressing the importance of low-sodium and fluid restriction patient's wife stated that he had a piece of pizza in our cafeteria waiting for the visit.  Although I know he has been working hard on this, I told him that 1 piece of pizza could be a slippery slope for him.    Overall he feels great.  Denies chest discomfort, shortness of breath, lightheadedness, dizziness, lower extremity edema.  His wife has been helping making low-sodium meals.  I congratulated him on weighing daily.  He was seen by nephrology and no changes were made to his diuretic regimen.  I note today that his BUN and creatinine are starting to climb.  Results are noted below.    Diagnotic studies:  EKG 8/15/2024: sinus rhythm  ms  Echocardiogram 8/2024: EF 55 to 60%.  Moderate concentric left ventricular hypertrophy.  Normal RV function.  Moderate to severe aortic stenosis.Vmax 3.5 m/sec, mean gradient 31 mmHg, MARILYN 0.9 cm2 by  continuity equation. DVI 0.22.  RHC on 7/31: showed moderate elevated right heart filling pressures, mean RA 14 mmHg, normal left heart  filling pressures, wedge 15 mmHg, mild pulmonary hypertension         Latest Reference Range & Units 08/09/24 06:38 08/15/24 14:33   Sodium 135 - 145 mmol/L 141 134 (L)   Potassium 3.4 - 5.3 mmol/L 3.9 3.3 (L)   Chloride 98 - 107 mmol/L 101 95 (L)   Carbon Dioxide (CO2) 22 - 29 mmol/L 34 (H) 27   Urea Nitrogen 8.0 - 23.0 mg/dL 27.4 (H) 40.4 (H)   Creatinine 0.67 - 1.17 mg/dL 1.27 (H) 1.65 (H)   GFR Estimate >60 mL/min/1.73m2 60 (L) 44 (L)   Calcium 8.8 - 10.4 mg/dL 9.0 9.6   Anion Gap 7 - 15 mmol/L 6 (L) 12   Glucose 70 - 99 mg/dL 94 151 (H)   N-Terminal Pro Bnp 0 - 900 pg/mL  374         Plan:   Acute on chronic diastolic heart failure.  Thought to be triggered by atrial fibrillation.  EF 55%.  GDMT: Bumex 3 mg twice daily, chlorthalidone 25 mg daily, losartan 25 mg daily, spironolactone 25 mg daily.  Will decrease Bumex to 3 mg in the morning and 2 mg in the afternoon.    Take an extra potassium today and tomorrow then back to 40 mEq twice daily.    Will try to uptitrate spironolactone at her next visit.  Repeat a BMP in 1 week.  See me in 2 weeks.  Reminded the patient of the sodium intake of 2000 mg daily.  Needs to be very careful and mindful of daily weights and contact us if his weight increases 3 pounds in a day or 5 pounds in a week.    I would like to trial Jardiance at her next visit.  This was deferred during hospitalization due to renal dysfunction, but if creatinine stabilizes we will consider adding this.      2. persistent atrial fibrillation.  Now on sotalol 80 mg every 12 hours.  QT stable.  Creatinine up slightly.    3.  Acute kidney injury due to cardiorenal syndrome and IV diuretic use.  Nephrology following.    4.  Moderate to severe aortic stenosis.  EF 55 to 60%.  Will need to touch base with the TAVR clinic to make sure they are following him peripherally.  Appears to have slight progression on his newest echo with aortic valve area now at 0.9 cm  (was 1.1 cm )    5.  Morbid obesity BMI 44  (down from 47)    We will watch Brayden closely and he is to contact us with questions or concerns.  Thank you for including us in his care.    The longitudinal plan of care for the diagnosis(es)/condition(s) as documented were addressed during this visit. Due to the added complexity in care, I will continue to support Brayden in the subsequent management and with ongoing continuity of care.    Shanthi Wilkins, NP, APRN CNP      Today's clinic visit entailed:  Review of the result(s) of each unique test - echo, EKG, labs  Ordering of each unique test  Prescription drug management  40 minutes spent by me on the date of the encounter doing chart review, review of test results, patient visit, documentation, and discussion with family   Provider  Link to MDM Help Grid     The level of medical decision making during this visit was of moderate complexity.      Orders Placed This Encounter   Procedures     Basic metabolic panel     Follow-Up with Cardiology OTTO     EKG 12-lead complete w/read - Clinics (performed today)     Orders Placed This Encounter   Medications     bumetanide (BUMEX) 1 MG tablet     Sig: 3 mg am and 2 mg pm     Medications Discontinued During This Encounter   Medication Reason     bumetanide (BUMEX) 1 MG tablet          Encounter Diagnoses   Name Primary?     Acute on chronic diastolic congestive heart failure (H) Yes     Acute on chronic congestive heart failure, unspecified heart failure type (H)        CURRENT MEDICATIONS:  Current Outpatient Medications   Medication Sig Dispense Refill     allopurinol (ZYLOPRIM) 300 MG tablet Take 1 tablet (300 mg) by mouth daily 90 tablet 4     bumetanide (BUMEX) 1 MG tablet 3 mg am and 2 mg pm       cetirizine (ZYRTEC) 10 MG tablet Take 10 mg by mouth every evening       chlorthalidone (HYGROTON) 25 MG tablet Take 1 tablet (25 mg) by mouth daily 30 tablet 0     finasteride (PROPECIA) 1 MG tablet Take 1 tablet (1 mg) by mouth daily 90 tablet 2     losartan (COZAAR) 25  MG tablet Take 1 tablet (25 mg) by mouth daily 30 tablet 0     miconazole (MICATIN) 2 % external powder Apply topically 2 times daily 85 g 0     potassium chloride pushpa ER (KLOR-CON M20) 20 MEQ CR tablet Take 2 tablets (40 mEq) by mouth 2 times daily 120 tablet 0     psyllium (METAMUCIL/KONSYL) capsule Take 1-2 capsules by mouth daily       rivaroxaban ANTICOAGULANT (XARELTO ANTICOAGULANT) 20 MG TABS tablet Take 1 tablet (20 mg) by mouth daily (with dinner) 90 tablet 3     sotalol (BETAPACE) 80 MG tablet Take 1 tablet (80 mg) by mouth every 12 hours 60 tablet 0     spironolactone (ALDACTONE) 25 MG tablet Take 1 tablet (25 mg) by mouth daily 90 tablet 2       ALLERGIES     Allergies   Allergen Reactions     Amiodarone Shortness Of Breath     Suspected amiodarone toxicity involving lungs       PAST MEDICAL HISTORY:  Past Medical History:   Diagnosis Date     (HFpEF) heart failure with preserved ejection fraction (H)      Acute gouty arthritis      Alcohol use disorder in remission      Amiodarone pulmonary toxicity      Aortic stenosis      Basal cell carcinoma     Of the nose     Bell's palsy      Herpes simplex without mention of complication      Hyperlipidemia      Hypertension 2000     Persistent atrial fibrillation (H)     persistent, DCCV 12/2017, ablation 11/6/17     Pulmonary nodules      Sleep apnea     CPAP       PAST SURGICAL HISTORY:  Past Surgical History:   Procedure Laterality Date     ABLATION OF DYSRHYTHMIC FOCUS  2018, 04/12/2019    Procedure: EP Ablation Focal AFIB;  Surgeon: Fady Day MD;  Location:  HEART CARDIAC CATH LAB     ACHILLES TENDON SURGERY  1997     ANESTHESIA CARDIOVERSION N/A 2/26/2019    Procedure: ANESTHESIA CARDIOVERSION (CONNER);  Surgeon: GENERIC ANESTHESIA PROVIDER;  Location:  OR     ANESTHESIA CARDIOVERSION N/A 11/19/2020    Procedure: ANESTHESIA, FOR CARDIOVERSION (dr campbell);  Surgeon: GENERIC ANESTHESIA PROVIDER;  Location:  OR     ANESTHESIA CARDIOVERSION N/A  8/22/2022    Procedure: CARDIOVERSION;  Surgeon: GENERIC ANESTHESIA PROVIDER;  Location:  OR     ANESTHESIA CARDIOVERSION N/A 9/13/2023    Procedure: Anesthesia cardioversion;  Surgeon: GENERIC ANESTHESIA PROVIDER;  Location:  OR     ANESTHESIA CARDIOVERSION N/A 10/6/2023    Procedure: Anesthesia cardioversion;  Surgeon: GENERIC ANESTHESIA PROVIDER;  Location:  OR     ANESTHESIA CARDIOVERSION N/A 8/1/2024    Procedure: Anesthesia cardioversion;  Surgeon: GENERIC ANESTHESIA PROVIDER;  Location:  OR     BASAL CELL CARCINOMA EXCISION Right 2009    nose, took cartilage from inner ear.     CARDIOVERSION  2012, 2019    cardioversion for atr fib     COLONOSCOPY  2008     COLONOSCOPY N/A 3/22/2023    Procedure: COLONOSCOPY, FLEXIBLE, WITH LESION REMOVAL USING SNARE;  Surgeon: Anne Lucero MD;  Location:  GI     CV RIGHT HEART CATH MEASUREMENTS RECORDED N/A 7/31/2024    Procedure: Right Heart Cath;  Surgeon: Adriano Lazo MD;  Location:  HEART CARDIAC CATH LAB     EP ABLATION FOCAL AFIB N/A 4/12/2019    Procedure: EP Ablation Focal AFIB;  Surgeon: Fady Day MD;  Location:  HEART CARDIAC CATH LAB     EP ABLATION FOCAL AFIB N/A 5/2/2024    Procedure: Ablation Atrial Fibrilation;  Surgeon: Fady Day MD;  Location:  HEART CARDIAC CATH LAB     HERNIA REPAIR, UMBILICAL  08/20/2012    Procedure: HERNIORRHAPHY UMBILICAL;  UMBILICAL HERNIA REPAIR;  Surgeon: Ra Crocker MD;  Location: [facility]     HERNIORRHAPHY UMBILICAL  8/20/2012    Procedure: HERNIORRHAPHY UMBILICAL;  UMBILICAL HERNIA REPAIR;  Surgeon: Ra Crocker MD;  Location: [facility]     ORTHOPEDIC SURGERY       ORTHOPEDIC SURGERY       OTHER SURGICAL HISTORY Right 03/2017    total KNEE ARTHROSCOPY      TOTAL KNEE ARTHROPLASTY Left 03/16/2018     ZZC NONSPECIFIC PROCEDURE  1997    achilles tendon     ZZC NONSPECIFIC PROCEDURE      knees, arthroscopy     ZZC NONSPECIFIC PROCEDURE      basal cell skin ca, nose     ZZC NONSPECIFIC  PROCEDURE      flex sig; colonoscopy due 3/2008     ZZC NONSPECIFIC PROCEDURE      cardioversion for atr fib     ZZC TOTAL KNEE ARTHROPLASTY Left 2018    Dr. Malick Suarez       FAMILY HISTORY:  Family History   Problem Relation Age of Onset     Family History Negative Mother      Heart Disease Father         decesased at 42 - MI     Heart Disease Sister          MI     Lipids Sister      Lipids Sister      No Known Problems Sister      Heart Disease Brother          MI     Heart Disease Brother         CABG AT 49     Lung Cancer No family hx of        SOCIAL HISTORY:  Social History     Socioeconomic History     Marital status: Single     Spouse name: None     Number of children: None     Years of education: None     Highest education level: None   Tobacco Use     Smoking status: Former     Current packs/day: 0.00     Average packs/day: 1 pack/day for 34.6 years (34.6 ttl pk-yrs)     Types: Cigarettes     Start date:      Quit date: 2011     Years since quittin.0     Passive exposure: Past     Smokeless tobacco: Never     Tobacco comments:     quit    Vaping Use     Vaping status: Never Used   Substance and Sexual Activity     Alcohol use: Not Currently     Drug use: Never     Sexual activity: Not Currently     Partners: Female     Birth control/protection: None   Social History Narrative    Worked in retail (-) and insurance sales     Spends some time on [place] on [place]       Review of Systems:  Skin:  not assessed     Eyes:  Positive for visual blurring  ENT:  not assessed    Respiratory:  Positive for shortness of breath;dyspnea on exertion  Cardiovascular:  Negative;palpitations;chest pain Positive for;edema;lightheadedness;dizziness  Gastroenterology: not assessed    Genitourinary:  Positive for nocturia  Musculoskeletal:  Positive for muscular weakness  Neurologic:  Positive for    Psychiatric:       Heme/Lymph/Imm:  Positive for allergies  Endocrine:   "Negative      Physical Exam:    Vitals: BP 98/54   Pulse 70   Ht 1.727 m (5' 8\")   Wt 133.5 kg (294 lb 6.4 oz)   SpO2 95%   BMI 44.76 kg/m    Constitutional: Well nourished and in no apparent distress.  Eyes: Pupils equal, round. Sclerae anicteric.   HEENT: Normocephalic, atraumatic.   Neck: Supple. No apparent JVD   Respiratory: Breathing non-labored. Lungs clear to auscultation bilaterally. No crackles, wheezes, rhonchi, or rales.  Cardiovascular:  Regular rate and rhythm, normal S1 and S2. II sm  Skin: Warm, dry. No rashes, cyanosis, or xanthelasma.  Extremities: No edema.  Neurologic: No gross motor deficits. Alert, awake, and oriented to person, place and time.  Psychiatric: Affect appropriate.        Recent Lab Results:  LIPID RESULTS:  Lab Results   Component Value Date    CHOL 163 07/27/2023    CHOL 159 06/30/2021    HDL 39 (L) 07/27/2023    HDL 44 06/30/2021    LDL 97 07/27/2023    LDL 91 06/30/2021    TRIG 133 07/27/2023    TRIG 121 06/30/2021    CHOLHDLRATIO 3.8 03/16/2015       LIVER ENZYME RESULTS:  Lab Results   Component Value Date    AST 34 05/05/2024    AST 24 06/30/2021    ALT 40 05/05/2024    ALT 42 06/30/2021       CBC RESULTS:  Lab Results   Component Value Date    WBC 10.2 08/03/2024    WBC 9.8 09/21/2020    RBC 3.97 (L) 08/03/2024    RBC 4.47 09/21/2020    HGB 12.6 (L) 08/03/2024    HGB 14.4 09/21/2020    HCT 40.1 08/03/2024    HCT 44.7 09/21/2020     (H) 08/03/2024     09/21/2020    MCH 31.7 08/03/2024    MCH 32.2 09/21/2020    MCHC 31.4 (L) 08/03/2024    MCHC 32.2 09/21/2020    RDW 16.5 (H) 08/03/2024    RDW 13.3 09/21/2020     08/03/2024     09/21/2020       BMP RESULTS:  Lab Results   Component Value Date     (L) 08/15/2024     06/30/2021    POTASSIUM 3.3 (L) 08/15/2024    POTASSIUM 4.6 12/17/2022    POTASSIUM 3.6 06/30/2021    CHLORIDE 95 (L) 08/15/2024    CHLORIDE 106 12/17/2022    CHLORIDE 109 06/30/2021    CO2 27 08/15/2024    CO2 30 " 12/17/2022    CO2 27 06/30/2021    ANIONGAP 12 08/15/2024    ANIONGAP 4 12/17/2022    ANIONGAP 5 06/30/2021     (H) 08/15/2024     (H) 11/26/2023     (H) 12/17/2022    GLC 89 06/30/2021    BUN 40.4 (H) 08/15/2024    BUN 15 12/17/2022    BUN 13 06/30/2021    CR 1.65 (H) 08/15/2024    CR 1.06 06/30/2021    GFRESTIMATED 44 (L) 08/15/2024    GFRESTIMATED 71 06/30/2021    GFRESTBLACK 82 06/30/2021    ASHELY 9.6 08/15/2024    ASHELY 8.7 06/30/2021        A1C RESULTS:  Lab Results   Component Value Date    A1C 5.7 (H) 02/25/2019       INR RESULTS:  Lab Results   Component Value Date    INR 2.47 (H) 08/01/2024    INR 2.10 (H) 11/26/2023    INR 2.04 (H) 02/26/2019    INR 1.66 (H) 11/06/2017           CC  No referring provider defined for this encounter.                  Thank you for allowing me to participate in the care of your patient.      Sincerely,     Shanthi Wilkins NP, APRN CNP     [facility]  cc:   No referring provider defined for this encounter.

## 2024-08-15 NOTE — PROGRESS NOTES
Cardiology Clinic Progress Note  Mike Schultz MRN# [id number]   YOB: 1950 Age: 73 year old       HPI:    Mike Schultz is a delightful 73 year old patient with past medical history significant for:    History of persistent atrial fibrillation.  First diagnosed in 2012.  Failed flecainide and noted scar on MRI.  PVI and SVC isolation 4/2017.  Repeat PVI and CTI for inducible flutter 4/2019.  Placed on amiodarone with cardioversion 11/19/2020 for recurrent A-fib in the setting of increased alcohol use.  Amiodarone decreased 1/2021; back in persistent A-fib 12/2021, and amiodarone stopped.  Amiodarone restarted 8/2022 prior to cardioversion 8/22/2022.  Amiodarone stopped again 1/2023.  A-fib 7/2023 after alcohol use with unsuccessful cardioversion 9/13.  Patient loaded with amiodarone with successful cardioversion 10/6/2023.  Repeat A-fib ablation this time without being triggered by alcohol.  Amiodarone stopped 11/2023 due to discovery of pneumonitis.  Brought back to the EP lab by Dr. Day 5/2/2024 for repeat isolation of posterior LA with linear ablation, reisolation of reconnected PVI and empiric CTI ablation for inducible flutter.  Patient initiated on sotalol with cardioversion earlier this month during prolonged hospitalization for HFpEF.  Continues to be in sinus rhythm.  Moderate aortic stenosis evaluated by TAVR.  No intervention at this point on 5/2024.  Hypertension  BOWEN with use of CPAP  Myocardial scar on MRI 10/2017 consistent with prior MI in the left circumflex territory.  EF 51%  History of alcohol use.  On admit patient noted he had few seltzer drinks in the past week prior to admission.  Cessation was encouraged during hospital admission.  Chronic anticoagulation on Xarelto for [id number] score 4  Bell's palsy  Pneumonitis and history of lung nodules.  Lung nodules did improve, followed by Dr. Mckeon.  Currently off prednisone for pneumonitis.  HFpEF with prolonged  hospitalization; admitted 7/25/2024, discharged 8/9/2024.  Patient treated with extensive diuretic therapy (Bumex drip) with assistance from nephrology.  Right heart catheterization 7/31/2024 showed moderate elevated right heart filling pressures.  Normal left heart filling pressures noted, wedge 15 mmHg.  Severe obesity with BMI of 45  BPH  CKD    Discharge weight: 294 pounds  Admission weight: 310 pounds    It is my pleasure seeing Brayden and his wife today for a hospital follow-up in the core clinic.  He had a prolonged hospitalization as outlined above.  His weight has been stable at home at 294 pounds.  In fact, he states he is down 3 pounds on his scale.  In reviewing his diet today and stressing the importance of low-sodium and fluid restriction patient's wife stated that he had a piece of pizza in our cafeteria waiting for the visit.  Although I know he has been working hard on this, I told him that 1 piece of pizza could be a slippery slope for him.    Overall he feels great.  Denies chest discomfort, shortness of breath, lightheadedness, dizziness, lower extremity edema.  His wife has been helping making low-sodium meals.  I congratulated him on weighing daily.  He was seen by nephrology and no changes were made to his diuretic regimen.  I note today that his BUN and creatinine are starting to climb.  Results are noted below.    Diagnotic studies:  EKG 8/15/2024: sinus rhythm  ms  Echocardiogram 8/2024: EF 55 to 60%.  Moderate concentric left ventricular hypertrophy.  Normal RV function.  Moderate to severe aortic stenosis.Vmax 3.5 m/sec, mean gradient 31 mmHg, MARILYN 0.9 cm2 by  continuity equation. DVI 0.22.  RHC on 7/31: showed moderate elevated right heart filling pressures, mean RA 14 mmHg, normal left heart filling pressures, wedge 15 mmHg, mild pulmonary hypertension         Latest Reference Range & Units 08/09/24 06:38 08/15/24 14:33   Sodium 135 - 145 mmol/L 141 134 (L)   Potassium 3.4 - 5.3  mmol/L 3.9 3.3 (L)   Chloride 98 - 107 mmol/L 101 95 (L)   Carbon Dioxide (CO2) 22 - 29 mmol/L 34 (H) 27   Urea Nitrogen 8.0 - 23.0 mg/dL 27.4 (H) 40.4 (H)   Creatinine 0.67 - 1.17 mg/dL 1.27 (H) 1.65 (H)   GFR Estimate >60 mL/min/1.73m2 60 (L) 44 (L)   Calcium 8.8 - 10.4 mg/dL 9.0 9.6   Anion Gap 7 - 15 mmol/L 6 (L) 12   Glucose 70 - 99 mg/dL 94 151 (H)   N-Terminal Pro Bnp 0 - 900 pg/mL  374         Plan:   Acute on chronic diastolic heart failure.  Thought to be triggered by atrial fibrillation.  EF 55%.  GDMT: Bumex 3 mg twice daily, chlorthalidone 25 mg daily, losartan 25 mg daily, spironolactone 25 mg daily.  Will decrease Bumex to 3 mg in the morning and 2 mg in the afternoon.    Take an extra potassium today and tomorrow then back to 40 mEq twice daily.    Will try to uptitrate spironolactone at her next visit.  Repeat a BMP in 1 week.  See me in 2 weeks.  Reminded the patient of the sodium intake of 2000 mg daily.  Needs to be very careful and mindful of daily weights and contact us if his weight increases 3 pounds in a day or 5 pounds in a week.    I would like to trial Jardiance at her next visit.  This was deferred during hospitalization due to renal dysfunction, but if creatinine stabilizes we will consider adding this.      2. persistent atrial fibrillation.  Now on sotalol 80 mg every 12 hours.  QT stable.  Creatinine up slightly.    3.  Acute kidney injury due to cardiorenal syndrome and IV diuretic use.  Nephrology following.    4.  Moderate to severe aortic stenosis.  EF 55 to 60%.  Will need to touch base with the TAVR clinic to make sure they are following him peripherally.  Appears to have slight progression on his newest echo with aortic valve area now at 0.9 cm  (was 1.1 cm )    5.  Morbid obesity BMI 44 (down from 47)    We will watch Brayden closely and he is to contact us with questions or concerns.  Thank you for including us in his care.    The longitudinal plan of care for the  diagnosis(es)/condition(s) as documented were addressed during this visit. Due to the added complexity in care, I will continue to support Brayden in the subsequent management and with ongoing continuity of care.    Shanthi Wilkins, NP, APRN CNP      Today's clinic visit entailed:  Review of the result(s) of each unique test - echo, EKG, labs  Ordering of each unique test  Prescription drug management  40 minutes spent by me on the date of the encounter doing chart review, review of test results, patient visit, documentation, and discussion with family   Provider  Link to [facility] Help Grid     The level of medical decision making during this visit was of moderate complexity.      Orders Placed This Encounter   Procedures    Basic metabolic panel    Follow-Up with Cardiology [facility]    EKG 12-lead complete w/read - Clinics (performed today)     Orders Placed This Encounter   Medications    bumetanide (BUMEX) 1 MG tablet     Sig: 3 mg am and 2 mg pm     Medications Discontinued During This Encounter   Medication Reason    bumetanide (BUMEX) 1 MG tablet          Encounter Diagnoses   Name Primary?    Acute on chronic diastolic congestive heart failure (H) Yes    Acute on chronic congestive heart failure, unspecified heart failure type (H)        CURRENT MEDICATIONS:  Current Outpatient Medications   Medication Sig Dispense Refill    allopurinol (ZYLOPRIM) 300 MG tablet Take 1 tablet (300 mg) by mouth daily 90 tablet 4    bumetanide (BUMEX) 1 MG tablet 3 mg am and 2 mg pm      cetirizine (ZYRTEC) 10 MG tablet Take 10 mg by mouth every evening      chlorthalidone (HYGROTON) 25 MG tablet Take 1 tablet (25 mg) by mouth daily 30 tablet 0    finasteride (PROPECIA) 1 MG tablet Take 1 tablet (1 mg) by mouth daily 90 tablet 2    losartan (COZAAR) 25 MG tablet Take 1 tablet (25 mg) by mouth daily 30 tablet 0    miconazole (MICATIN) 2 % external powder Apply topically 2 times daily 85 g 0    potassium chloride pushpa ER (KLOR-CON M20) 20 MEQ  CR tablet Take 2 tablets (40 mEq) by mouth 2 times daily 120 tablet 0    psyllium (METAMUCIL/KONSYL) capsule Take 1-2 capsules by mouth daily      rivaroxaban ANTICOAGULANT (XARELTO ANTICOAGULANT) 20 MG TABS tablet Take 1 tablet (20 mg) by mouth daily (with dinner) 90 tablet 3    sotalol (BETAPACE) 80 MG tablet Take 1 tablet (80 mg) by mouth every 12 hours 60 tablet 0    spironolactone (ALDACTONE) 25 MG tablet Take 1 tablet (25 mg) by mouth daily 90 tablet 2       ALLERGIES     Allergies   Allergen Reactions    Amiodarone Shortness Of Breath     Suspected amiodarone toxicity involving lungs       PAST MEDICAL HISTORY:  Past Medical History:   Diagnosis Date    (HFpEF) heart failure with preserved ejection fraction (H)     Acute gouty arthritis     Alcohol use disorder in remission     Amiodarone pulmonary toxicity     Aortic stenosis     Basal cell carcinoma     Of the nose    Bell's palsy     Herpes simplex without mention of complication     Hyperlipidemia     Hypertension 2000    Persistent atrial fibrillation (H)     persistent, DCCV 12/2017, ablation 11/6/17    Pulmonary nodules     Sleep apnea     CPAP       PAST SURGICAL HISTORY:  Past Surgical History:   Procedure Laterality Date    ABLATION OF DYSRHYTHMIC FOCUS  2018, 04/12/2019    Procedure: EP Ablation Focal AFIB;  Surgeon: Fady Day MD;  Location:  HEART CARDIAC CATH LAB    ACHILLES TENDON SURGERY  1997    ANESTHESIA CARDIOVERSION N/A 2/26/2019    Procedure: ANESTHESIA CARDIOVERSION (CONNER);  Surgeon: GENERIC ANESTHESIA PROVIDER;  Location:  OR    ANESTHESIA CARDIOVERSION N/A 11/19/2020    Procedure: ANESTHESIA, FOR CARDIOVERSION (dr campbell);  Surgeon: GENERIC ANESTHESIA PROVIDER;  Location:  OR    ANESTHESIA CARDIOVERSION N/A 8/22/2022    Procedure: CARDIOVERSION;  Surgeon: GENERIC ANESTHESIA PROVIDER;  Location:  OR    ANESTHESIA CARDIOVERSION N/A 9/13/2023    Procedure: Anesthesia cardioversion;  Surgeon: GENERIC ANESTHESIA PROVIDER;   Location:  OR    ANESTHESIA CARDIOVERSION N/A 10/6/2023    Procedure: Anesthesia cardioversion;  Surgeon: GENERIC ANESTHESIA PROVIDER;  Location:  OR    ANESTHESIA CARDIOVERSION N/A 8/1/2024    Procedure: Anesthesia cardioversion;  Surgeon: GENERIC ANESTHESIA PROVIDER;  Location:  OR    BASAL CELL CARCINOMA EXCISION Right 2009    nose, took cartilage from inner ear.    CARDIOVERSION  2012, 2019    cardioversion for atr fib    COLONOSCOPY  2008    COLONOSCOPY N/A 3/22/2023    Procedure: COLONOSCOPY, FLEXIBLE, WITH LESION REMOVAL USING SNARE;  Surgeon: Anne Lucero MD;  Location:  GI    CV RIGHT HEART CATH MEASUREMENTS RECORDED N/A 7/31/2024    Procedure: Right Heart Cath;  Surgeon: Adriano Lazo MD;  Location:  HEART CARDIAC CATH LAB    EP ABLATION FOCAL AFIB N/A 4/12/2019    Procedure: EP Ablation Focal AFIB;  Surgeon: Fady Day MD;  Location:  HEART CARDIAC CATH LAB    EP ABLATION FOCAL AFIB N/A 5/2/2024    Procedure: Ablation Atrial Fibrilation;  Surgeon: Fady Day MD;  Location:  HEART CARDIAC CATH LAB    HERNIA REPAIR, UMBILICAL  08/20/2012    Procedure: HERNIORRHAPHY UMBILICAL;  UMBILICAL HERNIA REPAIR;  Surgeon: Ra Crocker MD;  Location: [facility]    HERNIORRHAPHY UMBILICAL  8/20/2012    Procedure: HERNIORRHAPHY UMBILICAL;  UMBILICAL HERNIA REPAIR;  Surgeon: Ra Crocker MD;  Location: [facility]    ORTHOPEDIC SURGERY      ORTHOPEDIC SURGERY      OTHER SURGICAL HISTORY Right 03/2017    total KNEE ARTHROSCOPY     TOTAL KNEE ARTHROPLASTY Left 03/16/2018    ZZC NONSPECIFIC PROCEDURE  1997    achilles tendon    ZZC NONSPECIFIC PROCEDURE      knees, arthroscopy    ZZC NONSPECIFIC PROCEDURE      basal cell skin ca, nose    ZZC NONSPECIFIC PROCEDURE      flex sig; colonoscopy due 3/2008    ZZC NONSPECIFIC PROCEDURE  2012    cardioversion for atr fib    ZZC TOTAL KNEE ARTHROPLASTY Left 03/16/2018    Dr. Malick Suarez       FAMILY HISTORY:  Family History   Problem Relation  "Age of Onset    Family History Negative Mother     Heart Disease Father         decesased at 42 - MI    Heart Disease Sister          MI    Lipids Sister     Lipids Sister     No Known Problems Sister     Heart Disease Brother          MI    Heart Disease Brother         CABG AT 49    Lung Cancer No family hx of        SOCIAL HISTORY:  Social History     Socioeconomic History    Marital status: Single     Spouse name: None    Number of children: None    Years of education: None    Highest education level: None   Tobacco Use    Smoking status: Former     Current packs/day: 0.00     Average packs/day: 1 pack/day for 34.6 years (34.6 ttl pk-yrs)     Types: Cigarettes     Start date:      Quit date: 2011     Years since quittin.0     Passive exposure: Past    Smokeless tobacco: Never    Tobacco comments:     quit    Vaping Use    Vaping status: Never Used   Substance and Sexual Activity    Alcohol use: Not Currently    Drug use: Never    Sexual activity: Not Currently     Partners: Female     Birth control/protection: None   Social History Narrative    Worked in retail () and insurance sales     Spends some time on [place] on [place]       Review of Systems:  Skin:  not assessed     Eyes:  Positive for visual blurring  ENT:  not assessed    Respiratory:  Positive for shortness of breath;dyspnea on exertion  Cardiovascular:  Negative;palpitations;chest pain Positive for;edema;lightheadedness;dizziness  Gastroenterology: not assessed    Genitourinary:  Positive for nocturia  Musculoskeletal:  Positive for muscular weakness  Neurologic:  Positive for    Psychiatric:       Heme/Lymph/Imm:  Positive for allergies  Endocrine:  Negative      Physical Exam:    Vitals: BP 98/54   Pulse 70   Ht 1.727 m (5' 8\")   Wt 133.5 kg (294 lb 6.4 oz)   SpO2 95%   BMI 44.76 kg/m    Constitutional: Well nourished and in no apparent distress.  Eyes: Pupils equal, round. Sclerae anicteric. "   HEENT: Normocephalic, atraumatic.   Neck: Supple. No apparent JVD   Respiratory: Breathing non-labored. Lungs clear to auscultation bilaterally. No crackles, wheezes, rhonchi, or rales.  Cardiovascular:  Regular rate and rhythm, normal S1 and S2. II sm  Skin: Warm, dry. No rashes, cyanosis, or xanthelasma.  Extremities: No edema.  Neurologic: No gross motor deficits. Alert, awake, and oriented to person, place and time.  Psychiatric: Affect appropriate.        Recent Lab Results:  LIPID RESULTS:  Lab Results   Component Value Date    CHOL 163 07/27/2023    CHOL 159 06/30/2021    HDL 39 (L) 07/27/2023    HDL 44 06/30/2021    LDL 97 07/27/2023    LDL 91 06/30/2021    TRIG 133 07/27/2023    TRIG 121 06/30/2021    CHOLHDLRATIO 3.8 03/16/2015       LIVER ENZYME RESULTS:  Lab Results   Component Value Date    AST 34 05/05/2024    AST 24 06/30/2021    ALT 40 05/05/2024    ALT 42 06/30/2021       CBC RESULTS:  Lab Results   Component Value Date    WBC 10.2 08/03/2024    WBC 9.8 09/21/2020    RBC 3.97 (L) 08/03/2024    RBC 4.47 09/21/2020    HGB 12.6 (L) 08/03/2024    HGB 14.4 09/21/2020    HCT 40.1 08/03/2024    HCT 44.7 09/21/2020     (H) 08/03/2024     09/21/2020    MCH 31.7 08/03/2024    MCH 32.2 09/21/2020    MCHC 31.4 (L) 08/03/2024    MCHC 32.2 09/21/2020    RDW 16.5 (H) 08/03/2024    RDW 13.3 09/21/2020     08/03/2024     09/21/2020       BMP RESULTS:  Lab Results   Component Value Date     (L) 08/15/2024     06/30/2021    POTASSIUM 3.3 (L) 08/15/2024    POTASSIUM 4.6 12/17/2022    POTASSIUM 3.6 06/30/2021    CHLORIDE 95 (L) 08/15/2024    CHLORIDE 106 12/17/2022    CHLORIDE 109 06/30/2021    CO2 27 08/15/2024    CO2 30 12/17/2022    CO2 27 06/30/2021    ANIONGAP 12 08/15/2024    ANIONGAP 4 12/17/2022    ANIONGAP 5 06/30/2021     (H) 08/15/2024     (H) 11/26/2023     (H) 12/17/2022    GLC 89 06/30/2021    BUN 40.4 (H) 08/15/2024    BUN 15 12/17/2022    BUN  13 06/30/2021    CR 1.65 (H) 08/15/2024    CR 1.06 06/30/2021    GFRESTIMATED 44 (L) 08/15/2024    GFRESTIMATED 71 06/30/2021    GFRESTBLACK 82 06/30/2021    ASHELY 9.6 08/15/2024    ASHELY 8.7 06/30/2021        A1C RESULTS:  Lab Results   Component Value Date    A1C 5.7 (H) 02/25/2019       INR RESULTS:  Lab Results   Component Value Date    INR 2.47 (H) 08/01/2024    INR 2.10 (H) 11/26/2023    INR 2.04 (H) 02/26/2019    INR 1.66 (H) 11/06/2017           CC  No referring provider defined for this encounter.

## 2024-08-15 NOTE — PATIENT INSTRUCTIONS
Call my nurse with any questions or concerns:  [phone]  *If you have concerns after hours, please call [phone], option 2 to speak with on call Cardiologist.    1. Medication changes from today:    Decrease Bumex 3 mg am and 2 mg in pm(6 hours later)  Take an extra potassium  tablet today and tomorrow back to normal  Lab next Thursday   Sodium 2000 mg daily           2. Lab Results:    Latest Reference Range & Units 08/09/24 06:38 08/15/24 14:33   Sodium 135 - 145 mmol/L 141 134 (L)   Potassium 3.4 - 5.3 mmol/L 3.9 3.3 (L)   Chloride 98 - 107 mmol/L 101 95 (L)   Carbon Dioxide (CO2) 22 - 29 mmol/L 34 (H) 27   Urea Nitrogen 8.0 - 23.0 mg/dL 27.4 (H) 40.4 (H)   Creatinine 0.67 - 1.17 mg/dL 1.27 (H) 1.65 (H)   GFR Estimate >60 mL/min/1.73m2 60 (L) 44 (L)   Calcium 8.8 - 10.4 mg/dL 9.0 9.6   Anion Gap 7 - 15 mmol/L 6 (L) 12   Glucose 70 - 99 mg/dL 94 151 (H)     2 d ago Renal results  Comments    Glucose, Serum/Plasma  65 - 99 mg/dL 115 High               Fasting reference interval    For someone without known diabetes, a glucose value  between 100 and 125 mg/dL is consistent with  prediabetes and should be confirmed with a  follow-up test.   Urea nitrogen, Serum/Plasma (BUN)  7 - 25 mg/dL 36 High     Creatinine, Serum/Plasma  0.70 - 1.28 mg/dL 1.50 High     Estimated Glomerular Filtration Rate (eGFR)  > OR = 60 mL/min/1.73m2 49 Low     Urea nitrogen/Creatinine, Serum/Plasma  6 - 22 (calc) 24 High     Sodium, Serum/Plasma  135 - 146 mmol/L 140    Potassium, Serum/Plasma  3.5 - 5.3 mmol/L 3.6    Chloride, Serum/Plasma  98 - 110 mmol/L 101    Carbon dioxide CO2), total, Serum/Plasma  20 - 32 mmol/L 27    Calcium, Serum/Plasma  8.6 - 10.3 mg/dL 9.3    Phosphate, Serum/Plasma  2.1 - 4.3 mg/dL 3.3    Albumin, Serum/Plasma  3.6 - 5.1 g/dL 3.8

## 2024-08-21 ENCOUNTER — LAB (OUTPATIENT)
Dept: LAB | Facility: CLINIC | Age: 74
End: 2024-08-21
Payer: COMMERCIAL

## 2024-08-21 DIAGNOSIS — I50.9 ACUTE ON CHRONIC CONGESTIVE HEART FAILURE, UNSPECIFIED HEART FAILURE TYPE (H): ICD-10-CM

## 2024-08-21 LAB
ANION GAP SERPL CALCULATED.3IONS-SCNC: 13 MMOL/L (ref 7–15)
BUN SERPL-MCNC: 63.1 MG/DL (ref 8–23)
CALCIUM SERPL-MCNC: 9.5 MG/DL (ref 8.8–10.4)
CHLORIDE SERPL-SCNC: 92 MMOL/L (ref 98–107)
CREAT SERPL-MCNC: 3.26 MG/DL (ref 0.67–1.17)
EGFRCR SERPLBLD CKD-EPI 2021: 19 ML/MIN/1.73M2
GLUCOSE SERPL-MCNC: 123 MG/DL (ref 70–99)
HCO3 SERPL-SCNC: 28 MMOL/L (ref 22–29)
POTASSIUM SERPL-SCNC: 3.9 MMOL/L (ref 3.4–5.3)
SODIUM SERPL-SCNC: 133 MMOL/L (ref 135–145)

## 2024-08-21 PROCEDURE — 36415 COLL VENOUS BLD VENIPUNCTURE: CPT | Performed by: NURSE PRACTITIONER

## 2024-08-21 PROCEDURE — 80048 BASIC METABOLIC PNL TOTAL CA: CPT | Performed by: NURSE PRACTITIONER

## 2024-08-22 ENCOUNTER — LAB (OUTPATIENT)
Dept: LAB | Facility: CLINIC | Age: 74
End: 2024-08-22
Payer: COMMERCIAL

## 2024-08-22 DIAGNOSIS — I50.33 ACUTE ON CHRONIC DIASTOLIC CONGESTIVE HEART FAILURE (H): ICD-10-CM

## 2024-08-22 LAB
ANION GAP SERPL CALCULATED.3IONS-SCNC: 12 MMOL/L (ref 7–15)
BUN SERPL-MCNC: 64.5 MG/DL (ref 8–23)
CALCIUM SERPL-MCNC: 9.1 MG/DL (ref 8.8–10.4)
CHLORIDE SERPL-SCNC: 94 MMOL/L (ref 98–107)
CREAT SERPL-MCNC: 2.84 MG/DL (ref 0.67–1.17)
EGFRCR SERPLBLD CKD-EPI 2021: 23 ML/MIN/1.73M2
GLUCOSE SERPL-MCNC: 113 MG/DL (ref 70–99)
HCO3 SERPL-SCNC: 27 MMOL/L (ref 22–29)
POTASSIUM SERPL-SCNC: 3.6 MMOL/L (ref 3.4–5.3)
SODIUM SERPL-SCNC: 133 MMOL/L (ref 135–145)

## 2024-08-22 PROCEDURE — 36415 COLL VENOUS BLD VENIPUNCTURE: CPT | Performed by: NURSE PRACTITIONER

## 2024-08-22 PROCEDURE — 80048 BASIC METABOLIC PNL TOTAL CA: CPT | Performed by: NURSE PRACTITIONER

## 2024-08-23 DIAGNOSIS — N17.9 ACUTE KIDNEY FAILURE, UNSPECIFIED (H): Primary | ICD-10-CM

## 2024-08-26 ENCOUNTER — LAB (OUTPATIENT)
Dept: LAB | Facility: CLINIC | Age: 74
End: 2024-08-26
Payer: COMMERCIAL

## 2024-08-26 DIAGNOSIS — N17.9 ACUTE KIDNEY FAILURE, UNSPECIFIED (H): ICD-10-CM

## 2024-08-26 DIAGNOSIS — I50.9 ACUTE DECOMPENSATED HEART FAILURE (H): ICD-10-CM

## 2024-08-26 LAB
ANION GAP SERPL CALCULATED.3IONS-SCNC: 16 MMOL/L (ref 7–15)
BUN SERPL-MCNC: 33.9 MG/DL (ref 8–23)
CALCIUM SERPL-MCNC: 9.6 MG/DL (ref 8.8–10.4)
CHLORIDE SERPL-SCNC: 99 MMOL/L (ref 98–107)
CREAT SERPL-MCNC: 1.8 MG/DL (ref 0.67–1.17)
EGFRCR SERPLBLD CKD-EPI 2021: 39 ML/MIN/1.73M2
GLUCOSE SERPL-MCNC: 107 MG/DL (ref 70–99)
HCO3 SERPL-SCNC: 25 MMOL/L (ref 22–29)
POTASSIUM SERPL-SCNC: 3.8 MMOL/L (ref 3.4–5.3)
SODIUM SERPL-SCNC: 140 MMOL/L (ref 135–145)

## 2024-08-26 PROCEDURE — 80048 BASIC METABOLIC PNL TOTAL CA: CPT

## 2024-08-26 PROCEDURE — 36415 COLL VENOUS BLD VENIPUNCTURE: CPT

## 2024-08-27 ENCOUNTER — TELEPHONE (OUTPATIENT)
Dept: PULMONOLOGY | Facility: CLINIC | Age: 74
End: 2024-08-27
Payer: COMMERCIAL

## 2024-08-27 DIAGNOSIS — J96.01 ACUTE RESPIRATORY FAILURE WITH HYPOXIA (H): Primary | ICD-10-CM

## 2024-08-27 NOTE — TELEPHONE ENCOUNTER
----- Message from Vinayak Valles sent at 8/27/2024 11:20 AM CDT -----  BHUPINDER Blanchard,    Can we have this patient follow up with me in nexxt available with CT chest without contrast and pfts.     Thanks,  Vinayak  ----- Message -----  From: William Peters MD  Sent: 8/9/2024   9:06 AM CDT  To: MD Bhupinder Adams,    This patient was supposed to see you in the clinic for posthospital follow-up for possible pneumonitis.  I saw him in the hospital where he is admitted with paroxysmal A-fib and CHF exacerbation.  Has been diuresed for more than 20 pounds and feels a lot better.  All the ILD workup you had sent last time was negative.  He was on 5 mg of prednisone, which I discontinued.  The plan to continue diuresis for the next 2 or 3 days.  I just told him you will move his appointment to the next 2 to 3 weeks with the CT scan and the PFTs.  Let me know if you want me to do that.  I did not get the CT scan here as he is still in the middle of diuresis and had some fluid overload.    Thanks,    William.

## 2024-08-28 ENCOUNTER — OFFICE VISIT (OUTPATIENT)
Dept: CARDIOLOGY | Facility: CLINIC | Age: 74
End: 2024-08-28
Attending: NURSE PRACTITIONER
Payer: COMMERCIAL

## 2024-08-28 ENCOUNTER — TELEPHONE (OUTPATIENT)
Dept: CARDIOLOGY | Facility: CLINIC | Age: 74
End: 2024-08-28

## 2024-08-28 VITALS
HEART RATE: 60 BPM | HEIGHT: 68 IN | OXYGEN SATURATION: 94 % | WEIGHT: 287.8 LBS | DIASTOLIC BLOOD PRESSURE: 68 MMHG | SYSTOLIC BLOOD PRESSURE: 106 MMHG | BODY MASS INDEX: 43.62 KG/M2

## 2024-08-28 DIAGNOSIS — I50.33 ACUTE ON CHRONIC DIASTOLIC CONGESTIVE HEART FAILURE (H): ICD-10-CM

## 2024-08-28 DIAGNOSIS — I48.19 PERSISTENT ATRIAL FIBRILLATION (H): Primary | ICD-10-CM

## 2024-08-28 DIAGNOSIS — R06.09 DYSPNEA ON EXERTION: ICD-10-CM

## 2024-08-28 DIAGNOSIS — J96.01 ACUTE RESPIRATORY FAILURE WITH HYPOXIA (H): Primary | ICD-10-CM

## 2024-08-28 DIAGNOSIS — N18.32 STAGE 3B CHRONIC KIDNEY DISEASE (H): ICD-10-CM

## 2024-08-28 DIAGNOSIS — I35.0 AORTIC VALVE STENOSIS, ETIOLOGY OF CARDIAC VALVE DISEASE UNSPECIFIED: ICD-10-CM

## 2024-08-28 PROCEDURE — 99213 OFFICE O/P EST LOW 20 MIN: CPT | Performed by: NURSE PRACTITIONER

## 2024-08-28 NOTE — PROGRESS NOTES
Cardiology Clinic Progress Note  Mike Schultz MRN# [id number]   YOB: 1950 Age: 73 year old       HPI:    Mike Schultz is a delightful 73 year old patient with past medical history significant for:    History of persistent atrial fibrillation.  First diagnosed in 2012.  Failed flecainide and noted scar on MRI.  PVI and SVC isolation 4/2017.  Repeat PVI and CTI for inducible flutter 4/2019.  Placed on amiodarone with cardioversion 11/19/2020 for recurrent A-fib in the setting of increased alcohol use.  Amiodarone decreased 1/2021; back in persistent A-fib 12/2021, and amiodarone stopped.  Amiodarone restarted 8/2022 prior to cardioversion 8/22/2022.  Amiodarone stopped again 1/2023.  A-fib 7/2023 after alcohol use with unsuccessful cardioversion 9/13.  Patient loaded with amiodarone with successful cardioversion 10/6/2023.  Repeat A-fib ablation this time without being triggered by alcohol.  Amiodarone stopped 11/2023 due to discovery of pneumonitis.  Brought back to the EP lab by Dr. Day 5/2/2024 for repeat isolation of posterior LA with linear ablation, reisolation of reconnected PVI and empiric CTI ablation for inducible flutter.  Patient initiated on sotalol with cardioversion earlier this month during prolonged hospitalization for HFpEF.  Continues to be in sinus rhythm.  Moderate aortic stenosis evaluated by TAVR.  No intervention at this point on 5/2024.  Hypertension  BOWEN with use of CPAP  Myocardial scar on MRI 10/2017 consistent with prior MI in the left circumflex territory.  EF 51%  History of alcohol use.  On admit patient noted he had few seltzer drinks in the past week prior to admission.  Cessation was encouraged during hospital admission.  Chronic anticoagulation on Xarelto for [id number] score 4  Bell's palsy  Pneumonitis and history of lung nodules.  Lung nodules did improve, followed by Dr. Mckeon.  Currently off prednisone for pneumonitis.  HFpEF with prolonged  hospitalization; admitted 7/25/2024, discharged 8/9/2024.  Patient treated with extensive diuretic therapy (Bumex drip) with assistance from nephrology.  Right heart catheterization 7/31/2024 showed moderate elevated right heart filling pressures.  Normal left heart filling pressures noted, wedge 15 mmHg.  Severe obesity with BMI of 45  BPH  CKD III    Discharge weight: 294 pounds  Admission weight: 310 pounds  Weight today: 287 lbs    It is my pleasure seeing Brayden and his wife today for follow-up.  I saw him last on 8/15/2024.  At that time I decreased his Bumex to 3 tablets in the morning, 2 tablets in the afternoon.  Unfortunately, the lab work I ordered for the following week showed a severe jump of his BUN and creatinine to 3.26/63.  His Bumex, chlorthalidone, spironolactone, and losartan were all held.  His Bumex was reinitiated at 2 mg in the morning, 1 mg in the afternoon and losartan was started yesterday at 12.5 mg daily by nephrology.  His chlorthalidone and spironolactone remain on hold.    Lab work completed 2 days ago show potassium 3.8, BUN 33, creatinine 1.8, GFR 39.  He has been watching his diet closely.  Denies chest pain, orthopnea, PND, syncope, lower extremity edema.  Feels truly deconditioned after his prolonged hospitalization.  Still has shortness of breath with minimal exertion.  O2 sat at rest is 92 to 94%, with exertion typically will drop to 89% per patient.  He does have supplemental oxygen, but uses it rarely.          Diagnotic studies:  EKG 8/15/2024: Sinus rhythm,  ms  Echocardiogram 8/2024: EF 55 to 60%.  Moderate concentric left ventricular hypertrophy.  Normal RV function.  Moderate to severe aortic stenosis.Vmax 3.5 m/sec, mean gradient 31 mmHg, MARILYN 0.9 cm2 by  continuity equation. DVI 0.22.  RHC on 7/31: showed moderate elevated right heart filling pressures, mean RA 14 mmHg, normal left heart filling pressures, wedge 15 mmHg, mild pulmonary hypertension   Echocardiogram  5/6/2024: EF 55%.Moderate aortic stenosis, Vmax 3.7 m/s, mean gradient 35 mmHg, MARILYN  1.1cm2, DI 0.26. SVi 44 cc/m2. There is mild (1+) aortic regurgitation. Aortic valve morphology is not well visualized, however it is heavily calcified.        Latest Reference Range & Units 08/09/24 06:38 08/15/24 14:33 08/21/24 10:55 08/22/24 09:55 08/26/24 16:02   Sodium 135 - 145 mmol/L 141 134 (L) 133 (L) 133 (L) 140   Potassium 3.4 - 5.3 mmol/L 3.9 3.3 (L) 3.9 3.6 3.8   Chloride 98 - 107 mmol/L 101 95 (L) 92 (L) 94 (L) 99   Carbon Dioxide (CO2) 22 - 29 mmol/L 34 (H) 27 28 27 25   Urea Nitrogen 8.0 - 23.0 mg/dL 27.4 (H) 40.4 (H) 63.1 (H) 64.5 (H) 33.9 (H)   Creatinine 0.67 - 1.17 mg/dL 1.27 (H) 1.65 (H) 3.26 (H) 2.84 (H) 1.80 (H)   GFR Estimate >60 mL/min/1.73m2 60 (L) 44 (L) 19 (L) 23 (L) 39 (L)   Calcium 8.8 - 10.4 mg/dL 9.0 9.6 9.5 9.1 9.6   Anion Gap 7 - 15 mmol/L 6 (L) 12 13 12 16 (H)     Plan:   Acute on chronic diastolic heart failure.  Thought to be triggered by atrial fibrillation.  EF 55%.  GDMT: Bumex 2 mg in the morning, 1 mg in the p.m. losartan restarted yesterday 12.5 mg daily.  Chlorthalidone and spironolactone on hold for ROBIN  Repeat a BMP in 1 week.      Would like to consider an SGLT2 inhibitor in the future.  I think it is reasonable to let nephrology take the lead on initiation of that medication.  Patient following next Friday with nephrology.    2. Persistent atrial fibrillation.    Now on sotalol 80 mg every 12 hours.  QT stable.  Monitor closely due to fluctuation of creatinine.  Will repeat an EKG at visit in 3 weeks.      3.  Acute kidney injury due to cardiorenal syndrome and diuretic use.  Nephrology following.     4.  Moderate to severe aortic stenosis.  EF 55 to 60%.  Appears to have slight progression on his newest echo with aortic valve area now at 0.9 cm  (was 1.1 cm )  I have discussed his case with the TAVR RN and they will reach out to the patient for a formal consult.  He continues to have  shortness of breath with minimal exertion.    5.  Morbid obesity BMI 43 (down from 47)    6.  History of pneumonitis followed by Dr. Mckeon.  Currently off prednisone.  Patient encouraged to contact the pulmonologist about a repeat CT scan which has not yet been scheduled.    It is my pleasure seeing Brayden today in follow-up.  Labs are scheduled next week at [facility].  He will continue to weigh daily.  I have also encouraged him to start a walking program 5 to 10 minutes twice daily and increase as tolerated.    The longitudinal plan of care for the diagnosis(es)/condition(s) as documented were addressed during this visit. Due to the added complexity in care, I will continue to support Brayden in the subsequent management and with ongoing continuity of care.    Shanthi Wilkins, NP, APRN CNP      Today's clinic visit entailed:  Review of the result(s) of each unique test - Labs   Ordering of each unique test  25 minutes spent by me on the date of the encounter doing chart review, review of test results, patient visit, documentation, and discussion with family   Provider  Link to [facility] Help Grid     The level of medical decision making during this visit was of moderate complexity.      No orders of the defined types were placed in this encounter.    No orders of the defined types were placed in this encounter.    There are no discontinued medications.      Encounter Diagnosis   Name Primary?    Acute on chronic diastolic congestive heart failure (H)        CURRENT MEDICATIONS:  Current Outpatient Medications   Medication Sig Dispense Refill    allopurinol (ZYLOPRIM) 300 MG tablet Take 1 tablet (300 mg) by mouth daily 90 tablet 4    bumetanide (BUMEX) 1 MG tablet 2 mg am and 2 mg pm (Patient taking differently: 2 mg am and 1 mg pm) 360 tablet 1    cetirizine (ZYRTEC) 10 MG tablet Take 10 mg by mouth every evening      finasteride (PROPECIA) 1 MG tablet Take 1 tablet (1 mg) by mouth daily 90 tablet 2    losartan  (COZAAR) 25 MG tablet Take 1 tablet (25 mg) by mouth daily (Patient taking differently: Take 12.5 mg by mouth daily.) 30 tablet 0    potassium chloride pushpa ER (KLOR-CON M20) 20 MEQ CR tablet Take 2 tablets (40 mEq) by mouth 2 times daily 120 tablet 0    psyllium (METAMUCIL/KONSYL) capsule Take 1-2 capsules by mouth daily      rivaroxaban ANTICOAGULANT (XARELTO ANTICOAGULANT) 20 MG TABS tablet Take 1 tablet (20 mg) by mouth daily (with dinner) 90 tablet 3    sotalol (BETAPACE) 80 MG tablet Take 1 tablet (80 mg) by mouth every 12 hours 60 tablet 0    chlorthalidone (HYGROTON) 25 MG tablet Take 1 tablet (25 mg) by mouth daily HOLD (Patient not taking: Reported on 8/28/2024) 30 tablet 0    miconazole (MICATIN) 2 % external powder Apply topically 2 times daily (Patient not taking: Reported on 8/28/2024) 85 g 0    spironolactone (ALDACTONE) 25 MG tablet Take 1 tablet (25 mg) by mouth daily (Patient not taking: Reported on 8/28/2024) 90 tablet 2       ALLERGIES     Allergies   Allergen Reactions    Amiodarone Shortness Of Breath     Suspected amiodarone toxicity involving lungs       PAST MEDICAL HISTORY:  Past Medical History:   Diagnosis Date    (HFpEF) heart failure with preserved ejection fraction (H)     Acute gouty arthritis     Alcohol use disorder in remission     Amiodarone pulmonary toxicity     Aortic stenosis     Basal cell carcinoma     Of the nose    Bell's palsy     Herpes simplex without mention of complication     Hyperlipidemia     Hypertension 2000    Persistent atrial fibrillation (H)     persistent, DCCV 12/2017, ablation 11/6/17    Pulmonary nodules     Sleep apnea     CPAP       PAST SURGICAL HISTORY:  Past Surgical History:   Procedure Laterality Date    ABLATION OF DYSRHYTHMIC FOCUS  2018, 04/12/2019    Procedure: EP Ablation Focal AFIB;  Surgeon: Fady Day MD;  Location:  HEART CARDIAC CATH LAB    ACHILLES TENDON SURGERY  1997    ANESTHESIA CARDIOVERSION N/A 2/26/2019    Procedure:  ANESTHESIA CARDIOVERSION (CONNER);  Surgeon: GENERIC ANESTHESIA PROVIDER;  Location:  OR    ANESTHESIA CARDIOVERSION N/A 11/19/2020    Procedure: ANESTHESIA, FOR CARDIOVERSION (dr campbell);  Surgeon: GENERIC ANESTHESIA PROVIDER;  Location:  OR    ANESTHESIA CARDIOVERSION N/A 8/22/2022    Procedure: CARDIOVERSION;  Surgeon: GENERIC ANESTHESIA PROVIDER;  Location:  OR    ANESTHESIA CARDIOVERSION N/A 9/13/2023    Procedure: Anesthesia cardioversion;  Surgeon: GENERIC ANESTHESIA PROVIDER;  Location:  OR    ANESTHESIA CARDIOVERSION N/A 10/6/2023    Procedure: Anesthesia cardioversion;  Surgeon: GENERIC ANESTHESIA PROVIDER;  Location:  OR    ANESTHESIA CARDIOVERSION N/A 8/1/2024    Procedure: Anesthesia cardioversion;  Surgeon: GENERIC ANESTHESIA PROVIDER;  Location:  OR    BASAL CELL CARCINOMA EXCISION Right 2009    nose, took cartilage from inner ear.    CARDIOVERSION  2012, 2019    cardioversion for atr fib    COLONOSCOPY  2008    COLONOSCOPY N/A 3/22/2023    Procedure: COLONOSCOPY, FLEXIBLE, WITH LESION REMOVAL USING SNARE;  Surgeon: Anne Lucero MD;  Location:  GI    CV RIGHT HEART CATH MEASUREMENTS RECORDED N/A 7/31/2024    Procedure: Right Heart Cath;  Surgeon: Adriano Lazo MD;  Location:  HEART CARDIAC CATH LAB    EP ABLATION FOCAL AFIB N/A 4/12/2019    Procedure: EP Ablation Focal AFIB;  Surgeon: Fady Day MD;  Location:  HEART CARDIAC CATH LAB    EP ABLATION FOCAL AFIB N/A 5/2/2024    Procedure: Ablation Atrial Fibrilation;  Surgeon: Fady Day MD;  Location:  HEART CARDIAC CATH LAB    HERNIA REPAIR, UMBILICAL  08/20/2012    Procedure: HERNIORRHAPHY UMBILICAL;  UMBILICAL HERNIA REPAIR;  Surgeon: Ra Crocker MD;  Location: [facility]    HERNIORRHAPHY UMBILICAL  8/20/2012    Procedure: HERNIORRHAPHY UMBILICAL;  UMBILICAL HERNIA REPAIR;  Surgeon: Ra Crocker MD;  Location: [facility]    ORTHOPEDIC SURGERY      ORTHOPEDIC SURGERY      OTHER SURGICAL HISTORY Right  2017    total KNEE ARTHROSCOPY     TOTAL KNEE ARTHROPLASTY Left 2018    ZZC NONSPECIFIC PROCEDURE      achilles tendon    ZZC NONSPECIFIC PROCEDURE      knees, arthroscopy    ZZC NONSPECIFIC PROCEDURE      basal cell skin ca, nose    ZZC NONSPECIFIC PROCEDURE      flex sig; colonoscopy due 3/2008    ZZC NONSPECIFIC PROCEDURE      cardioversion for atr fib    ZZC TOTAL KNEE ARTHROPLASTY Left 2018    Dr. Malick Suarez       FAMILY HISTORY:  Family History   Problem Relation Age of Onset    Family History Negative Mother     Heart Disease Father         decesased at 42 - MI    Heart Disease Sister          MI    Lipids Sister     Lipids Sister     No Known Problems Sister     Heart Disease Brother          MI    Heart Disease Brother         CABG AT 49    Lung Cancer No family hx of        SOCIAL HISTORY:  Social History     Socioeconomic History    Marital status: Single     Spouse name: None    Number of children: None    Years of education: None    Highest education level: None   Tobacco Use    Smoking status: Former     Current packs/day: 0.00     Average packs/day: 1 pack/day for 34.6 years (34.6 ttl pk-yrs)     Types: Cigarettes     Start date:      Quit date: 2011     Years since quittin.0     Passive exposure: Past    Smokeless tobacco: Never    Tobacco comments:     quit    Vaping Use    Vaping status: Never Used   Substance and Sexual Activity    Alcohol use: Not Currently    Drug use: Never    Sexual activity: Not Currently     Partners: Female     Birth control/protection: None   Social History Narrative    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       Review of Systems:  Skin:        Eyes:       ENT:       Respiratory:  Positive for dyspnea on exertion;CPAP;sleep apnea  Cardiovascular:  Negative;syncope or near-syncope;palpitations;chest pain;cyanosis;edema Positive  "for;lightheadedness;dizziness  Gastroenterology:      Genitourinary:       Musculoskeletal:       Neurologic:       Psychiatric:       Heme/Lymph/Imm:       Endocrine:         Physical Exam:    Vitals: /68   Pulse 60   Ht 1.727 m (5' 8\")   Wt 130.5 kg (287 lb 12.8 oz)   SpO2 94%   BMI 43.76 kg/m    Constitutional: Well nourished and in no apparent distress.  Eyes: Pupils equal, round. Sclerae anicteric.   HEENT: Normocephalic, atraumatic.   Neck: Supple. No JVD   Respiratory: Breathing non-labored. Lungs clear to auscultation bilaterally. No crackles, wheezes, rhonchi, or rales.  Cardiovascular:  Regular rate and rhythm, normal S1 and S2. Grade III high pitched mumur.  Skin: Warm, dry. No rashes, cyanosis, or xanthelasma.  Extremities: No edema.  Neurologic: No gross motor deficits. Alert, awake, and oriented to person, place and time.  Psychiatric: Affect appropriate.        Recent Lab Results:  LIPID RESULTS:  Lab Results   Component Value Date    CHOL 163 07/27/2023    CHOL 159 06/30/2021    HDL 39 (L) 07/27/2023    HDL 44 06/30/2021    LDL 97 07/27/2023    LDL 91 06/30/2021    TRIG 133 07/27/2023    TRIG 121 06/30/2021    CHOLHDLRATIO 3.8 03/16/2015       LIVER ENZYME RESULTS:  Lab Results   Component Value Date    AST 34 05/05/2024    AST 24 06/30/2021    ALT 40 05/05/2024    ALT 42 06/30/2021       CBC RESULTS:  Lab Results   Component Value Date    WBC 10.2 08/03/2024    WBC 9.8 09/21/2020    RBC 3.97 (L) 08/03/2024    RBC 4.47 09/21/2020    HGB 12.6 (L) 08/03/2024    HGB 14.4 09/21/2020    HCT 40.1 08/03/2024    HCT 44.7 09/21/2020     (H) 08/03/2024     09/21/2020    MCH 31.7 08/03/2024    MCH 32.2 09/21/2020    MCHC 31.4 (L) 08/03/2024    MCHC 32.2 09/21/2020    RDW 16.5 (H) 08/03/2024    RDW 13.3 09/21/2020     08/03/2024     09/21/2020       BMP RESULTS:  Lab Results   Component Value Date     08/26/2024     06/30/2021    POTASSIUM 3.8 08/26/2024    " POTASSIUM 4.6 12/17/2022    POTASSIUM 3.6 06/30/2021    CHLORIDE 99 08/26/2024    CHLORIDE 106 12/17/2022    CHLORIDE 109 06/30/2021    CO2 25 08/26/2024    CO2 30 12/17/2022    CO2 27 06/30/2021    ANIONGAP 16 (H) 08/26/2024    ANIONGAP 4 12/17/2022    ANIONGAP 5 06/30/2021     (H) 08/26/2024     (H) 11/26/2023     (H) 12/17/2022    GLC 89 06/30/2021    BUN 33.9 (H) 08/26/2024    BUN 15 12/17/2022    BUN 13 06/30/2021    CR 1.80 (H) 08/26/2024    CR 1.06 06/30/2021    GFRESTIMATED 39 (L) 08/26/2024    GFRESTIMATED 71 06/30/2021    GFRESTBLACK 82 06/30/2021    ASHELY 9.6 08/26/2024    ASHELY 8.7 06/30/2021        A1C RESULTS:  Lab Results   Component Value Date    A1C 5.7 (H) 02/25/2019       INR RESULTS:  Lab Results   Component Value Date    INR 2.47 (H) 08/01/2024    INR 2.10 (H) 11/26/2023    INR 2.04 (H) 02/26/2019    INR 1.66 (H) 11/06/2017           CC  Shanthi Wilkins, APRN CNP  [id number]  [place] [address]

## 2024-08-28 NOTE — LETTER
8/28/2024    Ranjan Courtney MD  [address] [facility] [address]    RE: Mike Schultz       Dear Colleague,     I had the pleasure of seeing Mike Schultz in the [facility].  Cardiology Clinic Progress Note  Mike Schultz MRN# [id number]   YOB: 1950 Age: 73 year old       HPI:    Mike Schultz is a delightful 73 year old patient with past medical history significant for:    History of persistent atrial fibrillation.  First diagnosed in 2012.  Failed flecainide and noted scar on MRI.  PVI and SVC isolation 4/2017.  Repeat PVI and CTI for inducible flutter 4/2019.  Placed on amiodarone with cardioversion 11/19/2020 for recurrent A-fib in the setting of increased alcohol use.  Amiodarone decreased 1/2021; back in persistent A-fib 12/2021, and amiodarone stopped.  Amiodarone restarted 8/2022 prior to cardioversion 8/22/2022.  Amiodarone stopped again 1/2023.  A-fib 7/2023 after alcohol use with unsuccessful cardioversion 9/13.  Patient loaded with amiodarone with successful cardioversion 10/6/2023.  Repeat A-fib ablation this time without being triggered by alcohol.  Amiodarone stopped 11/2023 due to discovery of pneumonitis.  Brought back to the EP lab by Dr. Day 5/2/2024 for repeat isolation of posterior LA with linear ablation, reisolation of reconnected PVI and empiric CTI ablation for inducible flutter.  Patient initiated on sotalol with cardioversion earlier this month during prolonged hospitalization for HFpEF.  Continues to be in sinus rhythm.  Moderate aortic stenosis evaluated by TAVR.  No intervention at this point on 5/2024.  Hypertension  BOWEN with use of CPAP  Myocardial scar on MRI 10/2017 consistent with prior MI in the left circumflex territory.  EF 51%  History of alcohol use.  On admit patient noted he had few seltzer drinks in the past week prior to admission.  Cessation was encouraged during hospital admission.  Chronic anticoagulation on Xarelto for  [id number] score 4  Bell's palsy  Pneumonitis and history of lung nodules.  Lung nodules did improve, followed by Dr. Mckeon.  Currently off prednisone for pneumonitis.  HFpEF with prolonged hospitalization; admitted 7/25/2024, discharged 8/9/2024.  Patient treated with extensive diuretic therapy (Bumex drip) with assistance from nephrology.  Right heart catheterization 7/31/2024 showed moderate elevated right heart filling pressures.  Normal left heart filling pressures noted, wedge 15 mmHg.  Severe obesity with BMI of 45  BPH  CKD III    Discharge weight: 294 pounds  Admission weight: 310 pounds  Weight today: 287 lbs    It is my pleasure seeing Brayden and his wife today for follow-up.  I saw him last on 8/15/2024.  At that time I decreased his Bumex to 3 tablets in the morning, 2 tablets in the afternoon.  Unfortunately, the lab work I ordered for the following week showed a severe jump of his BUN and creatinine to 3.26/63.  His Bumex, chlorthalidone, spironolactone, and losartan were all held.  His Bumex was reinitiated at 2 mg in the morning, 1 mg in the afternoon and losartan was started yesterday at 12.5 mg daily by nephrology.  His chlorthalidone and spironolactone remain on hold.    Lab work completed 2 days ago show potassium 3.8, BUN 33, creatinine 1.8, GFR 39.  He has been watching his diet closely.  Denies chest pain, orthopnea, PND, syncope, lower extremity edema.  Feels truly deconditioned after his prolonged hospitalization.  Still has shortness of breath with minimal exertion.  O2 sat at rest is 92 to 94%, with exertion typically will drop to 89% per patient.  He does have supplemental oxygen, but uses it rarely.          Diagnotic studies:  EKG 8/15/2024: Sinus rhythm,  ms  Echocardiogram 8/2024: EF 55 to 60%.  Moderate concentric left ventricular hypertrophy.  Normal RV function.  Moderate to severe aortic stenosis.Vmax 3.5 m/sec, mean gradient 31 mmHg, MARILYN 0.9 cm2 by  continuity equation.  DVI 0.22.  RHC on 7/31: showed moderate elevated right heart filling pressures, mean RA 14 mmHg, normal left heart filling pressures, wedge 15 mmHg, mild pulmonary hypertension   Echocardiogram 5/6/2024: EF 55%.Moderate aortic stenosis, Vmax 3.7 m/s, mean gradient 35 mmHg, MARILYN  1.1cm2, DI 0.26. SVi 44 cc/m2. There is mild (1+) aortic regurgitation. Aortic valve morphology is not well visualized, however it is heavily calcified.        Latest Reference Range & Units 08/09/24 06:38 08/15/24 14:33 08/21/24 10:55 08/22/24 09:55 08/26/24 16:02   Sodium 135 - 145 mmol/L 141 134 (L) 133 (L) 133 (L) 140   Potassium 3.4 - 5.3 mmol/L 3.9 3.3 (L) 3.9 3.6 3.8   Chloride 98 - 107 mmol/L 101 95 (L) 92 (L) 94 (L) 99   Carbon Dioxide (CO2) 22 - 29 mmol/L 34 (H) 27 28 27 25   Urea Nitrogen 8.0 - 23.0 mg/dL 27.4 (H) 40.4 (H) 63.1 (H) 64.5 (H) 33.9 (H)   Creatinine 0.67 - 1.17 mg/dL 1.27 (H) 1.65 (H) 3.26 (H) 2.84 (H) 1.80 (H)   GFR Estimate >60 mL/min/1.73m2 60 (L) 44 (L) 19 (L) 23 (L) 39 (L)   Calcium 8.8 - 10.4 mg/dL 9.0 9.6 9.5 9.1 9.6   Anion Gap 7 - 15 mmol/L 6 (L) 12 13 12 16 (H)     Plan:   Acute on chronic diastolic heart failure.  Thought to be triggered by atrial fibrillation.  EF 55%.  GDMT: Bumex 2 mg in the morning, 1 mg in the p.m. losartan restarted yesterday 12.5 mg daily.  Chlorthalidone and spironolactone on hold for ROBIN  Repeat a BMP in 1 week.      Would like to consider an SGLT2 inhibitor in the future.  I think it is reasonable to let nephrology take the lead on initiation of that medication.  Patient following next Friday with nephrology.    2. Persistent atrial fibrillation.    Now on sotalol 80 mg every 12 hours.  QT stable.  Monitor closely due to fluctuation of creatinine.  Will repeat an EKG at visit in 3 weeks.      3.  Acute kidney injury due to cardiorenal syndrome and diuretic use.  Nephrology following.     4.  Moderate to severe aortic stenosis.  EF 55 to 60%.  Appears to have slight progression on  his newest echo with aortic valve area now at 0.9 cm  (was 1.1 cm )  I have discussed his case with the TAVR RN and they will reach out to the patient for a formal consult.  He continues to have shortness of breath with minimal exertion.    5.  Morbid obesity BMI 43 (down from 47)    6.  History of pneumonitis followed by Dr. Mckeon.  Currently off prednisone.  Patient encouraged to contact the pulmonologist about a repeat CT scan which has not yet been scheduled.    It is my pleasure seeing Brayden today in follow-up.  Labs are scheduled next week at [facility].  He will continue to weigh daily.  I have also encouraged him to start a walking program 5 to 10 minutes twice daily and increase as tolerated.    The longitudinal plan of care for the diagnosis(es)/condition(s) as documented were addressed during this visit. Due to the added complexity in care, I will continue to support Brayden in the subsequent management and with ongoing continuity of care.    Shanthi Wilkins NP, APRN CNP      Today's clinic visit entailed:  Review of the result(s) of each unique test - Labs   Ordering of each unique test  25 minutes spent by me on the date of the encounter doing chart review, review of test results, patient visit, documentation, and discussion with family   Provider  Link to MDM Help Grid     The level of medical decision making during this visit was of moderate complexity.      No orders of the defined types were placed in this encounter.    No orders of the defined types were placed in this encounter.    There are no discontinued medications.      Encounter Diagnosis   Name Primary?     Acute on chronic diastolic congestive heart failure (H)        CURRENT MEDICATIONS:  Current Outpatient Medications   Medication Sig Dispense Refill     allopurinol (ZYLOPRIM) 300 MG tablet Take 1 tablet (300 mg) by mouth daily 90 tablet 4     bumetanide (BUMEX) 1 MG tablet 2 mg am and 2 mg pm (Patient taking differently: 2 mg am and 1  mg pm) 360 tablet 1     cetirizine (ZYRTEC) 10 MG tablet Take 10 mg by mouth every evening       finasteride (PROPECIA) 1 MG tablet Take 1 tablet (1 mg) by mouth daily 90 tablet 2     losartan (COZAAR) 25 MG tablet Take 1 tablet (25 mg) by mouth daily (Patient taking differently: Take 12.5 mg by mouth daily.) 30 tablet 0     potassium chloride pushpa ER (KLOR-CON M20) 20 MEQ CR tablet Take 2 tablets (40 mEq) by mouth 2 times daily 120 tablet 0     psyllium (METAMUCIL/KONSYL) capsule Take 1-2 capsules by mouth daily       rivaroxaban ANTICOAGULANT (XARELTO ANTICOAGULANT) 20 MG TABS tablet Take 1 tablet (20 mg) by mouth daily (with dinner) 90 tablet 3     sotalol (BETAPACE) 80 MG tablet Take 1 tablet (80 mg) by mouth every 12 hours 60 tablet 0     chlorthalidone (HYGROTON) 25 MG tablet Take 1 tablet (25 mg) by mouth daily HOLD (Patient not taking: Reported on 8/28/2024) 30 tablet 0     miconazole (MICATIN) 2 % external powder Apply topically 2 times daily (Patient not taking: Reported on 8/28/2024) 85 g 0     spironolactone (ALDACTONE) 25 MG tablet Take 1 tablet (25 mg) by mouth daily (Patient not taking: Reported on 8/28/2024) 90 tablet 2       ALLERGIES     Allergies   Allergen Reactions     Amiodarone Shortness Of Breath     Suspected amiodarone toxicity involving lungs       PAST MEDICAL HISTORY:  Past Medical History:   Diagnosis Date     (HFpEF) heart failure with preserved ejection fraction (H)      Acute gouty arthritis      Alcohol use disorder in remission      Amiodarone pulmonary toxicity      Aortic stenosis      Basal cell carcinoma     Of the nose     Bell's palsy      Herpes simplex without mention of complication      Hyperlipidemia      Hypertension 2000     Persistent atrial fibrillation (H)     persistent, DCCV 12/2017, ablation 11/6/17     Pulmonary nodules      Sleep apnea     CPAP       PAST SURGICAL HISTORY:  Past Surgical History:   Procedure Laterality Date     ABLATION OF DYSRHYTHMIC FOCUS   2018, 04/12/2019    Procedure: EP Ablation Focal AFIB;  Surgeon: Fady Day MD;  Location:  HEART CARDIAC CATH LAB     ACHILLES TENDON SURGERY  1997     ANESTHESIA CARDIOVERSION N/A 2/26/2019    Procedure: ANESTHESIA CARDIOVERSION (CONNER);  Surgeon: GENERIC ANESTHESIA PROVIDER;  Location:  OR     ANESTHESIA CARDIOVERSION N/A 11/19/2020    Procedure: ANESTHESIA, FOR CARDIOVERSION (dr campbell);  Surgeon: GENERIC ANESTHESIA PROVIDER;  Location:  OR     ANESTHESIA CARDIOVERSION N/A 8/22/2022    Procedure: CARDIOVERSION;  Surgeon: GENERIC ANESTHESIA PROVIDER;  Location:  OR     ANESTHESIA CARDIOVERSION N/A 9/13/2023    Procedure: Anesthesia cardioversion;  Surgeon: GENERIC ANESTHESIA PROVIDER;  Location:  OR     ANESTHESIA CARDIOVERSION N/A 10/6/2023    Procedure: Anesthesia cardioversion;  Surgeon: GENERIC ANESTHESIA PROVIDER;  Location:  OR     ANESTHESIA CARDIOVERSION N/A 8/1/2024    Procedure: Anesthesia cardioversion;  Surgeon: GENERIC ANESTHESIA PROVIDER;  Location:  OR     BASAL CELL CARCINOMA EXCISION Right 2009    nose, took cartilage from inner ear.     CARDIOVERSION  2012, 2019    cardioversion for atr fib     COLONOSCOPY  2008     COLONOSCOPY N/A 3/22/2023    Procedure: COLONOSCOPY, FLEXIBLE, WITH LESION REMOVAL USING SNARE;  Surgeon: Anne Lucero MD;  Location:  GI     CV RIGHT HEART CATH MEASUREMENTS RECORDED N/A 7/31/2024    Procedure: Right Heart Cath;  Surgeon: Adriano Lazo MD;  Location:  HEART CARDIAC CATH LAB     EP ABLATION FOCAL AFIB N/A 4/12/2019    Procedure: EP Ablation Focal AFIB;  Surgeon: Fady Day MD;  Location:  HEART CARDIAC CATH LAB     EP ABLATION FOCAL AFIB N/A 5/2/2024    Procedure: Ablation Atrial Fibrilation;  Surgeon: Fady Day MD;  Location:  HEART CARDIAC CATH LAB     HERNIA REPAIR, UMBILICAL  08/20/2012    Procedure: HERNIORRHAPHY UMBILICAL;  UMBILICAL HERNIA REPAIR;  Surgeon: Ra Crocker MD;  Location: [facility]      HERNIORRHAPHY UMBILICAL  2012    Procedure: HERNIORRHAPHY UMBILICAL;  UMBILICAL HERNIA REPAIR;  Surgeon: Ra Crocker MD;  Location: [facility]     ORTHOPEDIC SURGERY       ORTHOPEDIC SURGERY       OTHER SURGICAL HISTORY Right 2017    total KNEE ARTHROSCOPY      TOTAL KNEE ARTHROPLASTY Left 2018     Z NONSPECIFIC PROCEDURE  1997    achilles tendon     Z NONSPECIFIC PROCEDURE      knees, arthroscopy     ZZC NONSPECIFIC PROCEDURE      basal cell skin ca, nose     Z NONSPECIFIC PROCEDURE      flex sig; colonoscopy due 3/2008     ZZ NONSPECIFIC PROCEDURE      cardioversion for atr fib     ZZC TOTAL KNEE ARTHROPLASTY Left 2018    Dr. Malick Suarez       FAMILY HISTORY:  Family History   Problem Relation Age of Onset     Family History Negative Mother      Heart Disease Father         decesased at 42 - MI     Heart Disease Sister          MI     Lipids Sister      Lipids Sister      No Known Problems Sister      Heart Disease Brother          MI     Heart Disease Brother         CABG AT 49     Lung Cancer No family hx of        SOCIAL HISTORY:  Social History     Socioeconomic History     Marital status: Single     Spouse name: None     Number of children: None     Years of education: None     Highest education level: None   Tobacco Use     Smoking status: Former     Current packs/day: 0.00     Average packs/day: 1 pack/day for 34.6 years (34.6 ttl pk-yrs)     Types: Cigarettes     Start date:      Quit date: 2011     Years since quittin.0     Passive exposure: Past     Smokeless tobacco: Never     Tobacco comments:     quit    Vaping Use     Vaping status: Never Used   Substance and Sexual Activity     Alcohol use: Not Currently     Drug use: Never     Sexual activity: Not Currently     Partners: Female     Birth control/protection: None   Social History Narrative    Worked in retail (7-) and insurance sales     Spends some time on [place] on [facility]  "boat       Review of Systems:  Skin:        Eyes:       ENT:       Respiratory:  Positive for dyspnea on exertion;CPAP;sleep apnea  Cardiovascular:  Negative;syncope or near-syncope;palpitations;chest pain;cyanosis;edema Positive for;lightheadedness;dizziness  Gastroenterology:      Genitourinary:       Musculoskeletal:       Neurologic:       Psychiatric:       Heme/Lymph/Imm:       Endocrine:         Physical Exam:    Vitals: /68   Pulse 60   Ht 1.727 m (5' 8\")   Wt 130.5 kg (287 lb 12.8 oz)   SpO2 94%   BMI 43.76 kg/m    Constitutional: Well nourished and in no apparent distress.  Eyes: Pupils equal, round. Sclerae anicteric.   HEENT: Normocephalic, atraumatic.   Neck: Supple. No JVD   Respiratory: Breathing non-labored. Lungs clear to auscultation bilaterally. No crackles, wheezes, rhonchi, or rales.  Cardiovascular:  Regular rate and rhythm, normal S1 and S2. Grade III high pitched mumur.  Skin: Warm, dry. No rashes, cyanosis, or xanthelasma.  Extremities: No edema.  Neurologic: No gross motor deficits. Alert, awake, and oriented to person, place and time.  Psychiatric: Affect appropriate.        Recent Lab Results:  LIPID RESULTS:  Lab Results   Component Value Date    CHOL 163 07/27/2023    CHOL 159 06/30/2021    HDL 39 (L) 07/27/2023    HDL 44 06/30/2021    LDL 97 07/27/2023    LDL 91 06/30/2021    TRIG 133 07/27/2023    TRIG 121 06/30/2021    CHOLHDLRATIO 3.8 03/16/2015       LIVER ENZYME RESULTS:  Lab Results   Component Value Date    AST 34 05/05/2024    AST 24 06/30/2021    ALT 40 05/05/2024    ALT 42 06/30/2021       CBC RESULTS:  Lab Results   Component Value Date    WBC 10.2 08/03/2024    WBC 9.8 09/21/2020    RBC 3.97 (L) 08/03/2024    RBC 4.47 09/21/2020    HGB 12.6 (L) 08/03/2024    HGB 14.4 09/21/2020    HCT 40.1 08/03/2024    HCT 44.7 09/21/2020     (H) 08/03/2024     09/21/2020    MCH 31.7 08/03/2024    MCH 32.2 09/21/2020    MCHC 31.4 (L) 08/03/2024    MCHC 32.2 " 09/21/2020    RDW 16.5 (H) 08/03/2024    RDW 13.3 09/21/2020     08/03/2024     09/21/2020       BMP RESULTS:  Lab Results   Component Value Date     08/26/2024     06/30/2021    POTASSIUM 3.8 08/26/2024    POTASSIUM 4.6 12/17/2022    POTASSIUM 3.6 06/30/2021    CHLORIDE 99 08/26/2024    CHLORIDE 106 12/17/2022    CHLORIDE 109 06/30/2021    CO2 25 08/26/2024    CO2 30 12/17/2022    CO2 27 06/30/2021    ANIONGAP 16 (H) 08/26/2024    ANIONGAP 4 12/17/2022    ANIONGAP 5 06/30/2021     (H) 08/26/2024     (H) 11/26/2023     (H) 12/17/2022    GLC 89 06/30/2021    BUN 33.9 (H) 08/26/2024    BUN 15 12/17/2022    BUN 13 06/30/2021    CR 1.80 (H) 08/26/2024    CR 1.06 06/30/2021    GFRESTIMATED 39 (L) 08/26/2024    GFRESTIMATED 71 06/30/2021    GFRESTBLACK 82 06/30/2021    ASHELY 9.6 08/26/2024    ASHELY 8.7 06/30/2021        A1C RESULTS:  Lab Results   Component Value Date    A1C 5.7 (H) 02/25/2019       INR RESULTS:  Lab Results   Component Value Date    INR 2.47 (H) 08/01/2024    INR 2.10 (H) 11/26/2023    INR 2.04 (H) 02/26/2019    INR 1.66 (H) 11/06/2017           CC  AYAN Doe CNP  [address]  [place] [id number]                  Thank you for allowing me to participate in the care of your patient.      Sincerely,     Shanthi Wilkins, NP, APRN CNP     [facility]  cc:   AYAN Doe CNP  [address]  [place] [phone]

## 2024-08-28 NOTE — PATIENT INSTRUCTIONS
Call my nurse with any questions or concerns:  [phone]  *If you have concerns after hours, please call [phone], option 2 to speak with on call Cardiologist.    No changes  Lab next Thursday   Should contact pulmonology about repeat CT scan

## 2024-08-28 NOTE — TELEPHONE ENCOUNTER
Structural Heart Clinic [facility]    TAVR referral received from: Shanthi Wilkins    Chart and recent lab results reviewed.  TAVR CT: Will wait until after consult due to renal function.   Contrast allergy: None  Contacted patient to introduce self, provide education on aortic stenosis.   Arranged TAVR consult with Dr. Muse  Provided direct contact information.     Yvonne Baltazar RN  Structural Heart Coordinator  [facility]

## 2024-09-04 ENCOUNTER — LAB (OUTPATIENT)
Dept: LAB | Facility: CLINIC | Age: 74
End: 2024-09-04
Payer: COMMERCIAL

## 2024-09-04 DIAGNOSIS — I50.9 ACUTE DECOMPENSATED HEART FAILURE (H): ICD-10-CM

## 2024-09-04 DIAGNOSIS — I50.33 ACUTE ON CHRONIC DIASTOLIC CONGESTIVE HEART FAILURE (H): ICD-10-CM

## 2024-09-04 LAB
ANION GAP SERPL CALCULATED.3IONS-SCNC: 12 MMOL/L (ref 7–15)
BUN SERPL-MCNC: 19.3 MG/DL (ref 8–23)
CALCIUM SERPL-MCNC: 8.9 MG/DL (ref 8.8–10.4)
CHLORIDE SERPL-SCNC: 102 MMOL/L (ref 98–107)
CREAT SERPL-MCNC: 1.06 MG/DL (ref 0.67–1.17)
EGFRCR SERPLBLD CKD-EPI 2021: 75 ML/MIN/1.73M2
GLUCOSE SERPL-MCNC: 108 MG/DL (ref 70–99)
HCO3 SERPL-SCNC: 26 MMOL/L (ref 22–29)
NT-PROBNP SERPL-MCNC: 654 PG/ML (ref 0–900)
POTASSIUM SERPL-SCNC: 3.8 MMOL/L (ref 3.4–5.3)
SODIUM SERPL-SCNC: 140 MMOL/L (ref 135–145)

## 2024-09-04 PROCEDURE — 83880 ASSAY OF NATRIURETIC PEPTIDE: CPT

## 2024-09-04 PROCEDURE — 80048 BASIC METABOLIC PNL TOTAL CA: CPT

## 2024-09-04 PROCEDURE — 36415 COLL VENOUS BLD VENIPUNCTURE: CPT

## 2024-09-05 ENCOUNTER — APPOINTMENT (OUTPATIENT)
Dept: CT IMAGING | Facility: CLINIC | Age: 74
DRG: 064 | End: 2024-09-05
Attending: EMERGENCY MEDICINE
Payer: COMMERCIAL

## 2024-09-05 ENCOUNTER — APPOINTMENT (OUTPATIENT)
Dept: MRI IMAGING | Facility: CLINIC | Age: 74
DRG: 064 | End: 2024-09-05
Payer: COMMERCIAL

## 2024-09-05 ENCOUNTER — HOSPITAL ENCOUNTER (INPATIENT)
Facility: CLINIC | Age: 74
LOS: 1 days | Discharge: HOME | DRG: 064 | End: 2024-09-06
Attending: EMERGENCY MEDICINE | Admitting: HOSPITALIST
Payer: COMMERCIAL

## 2024-09-05 ENCOUNTER — APPOINTMENT (OUTPATIENT)
Dept: SPEECH THERAPY | Facility: CLINIC | Age: 74
DRG: 064 | End: 2024-09-05
Attending: PHYSICIAN ASSISTANT
Payer: COMMERCIAL

## 2024-09-05 ENCOUNTER — APPOINTMENT (OUTPATIENT)
Dept: GENERAL RADIOLOGY | Facility: CLINIC | Age: 74
DRG: 064 | End: 2024-09-05
Attending: PHYSICIAN ASSISTANT
Payer: COMMERCIAL

## 2024-09-05 DIAGNOSIS — I50.9 ACUTE DECOMPENSATED HEART FAILURE (H): Primary | ICD-10-CM

## 2024-09-05 DIAGNOSIS — I63.9 ACUTE CVA (CEREBROVASCULAR ACCIDENT) (H): ICD-10-CM

## 2024-09-05 DIAGNOSIS — I10 ESSENTIAL HYPERTENSION: ICD-10-CM

## 2024-09-05 DIAGNOSIS — R47.1 DYSARTHRIA: ICD-10-CM

## 2024-09-05 DIAGNOSIS — I48.19 PERSISTENT ATRIAL FIBRILLATION (H): ICD-10-CM

## 2024-09-05 DIAGNOSIS — R29.810 FACIAL DROOP: ICD-10-CM

## 2024-09-05 LAB
ANION GAP SERPL CALCULATED.3IONS-SCNC: 11 MMOL/L (ref 7–15)
APTT PPP: 37 SECONDS (ref 22–38)
ATRIAL RATE - MUSE: 67 BPM
BASOPHILS # BLD AUTO: 0.1 10E3/UL (ref 0–0.2)
BASOPHILS NFR BLD AUTO: 1 %
BUN SERPL-MCNC: 16.3 MG/DL (ref 8–23)
CALCIUM SERPL-MCNC: 9.1 MG/DL (ref 8.8–10.4)
CHLORIDE SERPL-SCNC: 102 MMOL/L (ref 98–107)
CHOLEST SERPL-MCNC: 187 MG/DL
CREAT SERPL-MCNC: 1.04 MG/DL (ref 0.67–1.17)
DIASTOLIC BLOOD PRESSURE - MUSE: NORMAL MMHG
EGFRCR SERPLBLD CKD-EPI 2021: 76 ML/MIN/1.73M2
EOSINOPHIL # BLD AUTO: 0.3 10E3/UL (ref 0–0.7)
EOSINOPHIL NFR BLD AUTO: 3 %
ERYTHROCYTE [DISTWIDTH] IN BLOOD BY AUTOMATED COUNT: 15.2 % (ref 10–15)
GLUCOSE BLDC GLUCOMTR-MCNC: 122 MG/DL (ref 70–99)
GLUCOSE SERPL-MCNC: 104 MG/DL (ref 70–99)
HBA1C MFR BLD: 6.5 %
HCO3 SERPL-SCNC: 27 MMOL/L (ref 22–29)
HCT VFR BLD AUTO: 43.7 % (ref 40–53)
HDLC SERPL-MCNC: 35 MG/DL
HGB BLD-MCNC: 14.3 G/DL (ref 13.3–17.7)
IMM GRANULOCYTES # BLD: 0 10E3/UL
IMM GRANULOCYTES NFR BLD: 0 %
INR PPP: 1.97 (ref 0.85–1.15)
INTERPRETATION ECG - MUSE: NORMAL
LDLC SERPL CALC-MCNC: 115 MG/DL
LYMPHOCYTES # BLD AUTO: 2 10E3/UL (ref 0.8–5.3)
LYMPHOCYTES NFR BLD AUTO: 21 %
MAGNESIUM SERPL-MCNC: 1.7 MG/DL (ref 1.7–2.3)
MCH RBC QN AUTO: 32.7 PG (ref 26.5–33)
MCHC RBC AUTO-ENTMCNC: 32.7 G/DL (ref 31.5–36.5)
MCV RBC AUTO: 100 FL (ref 78–100)
MONOCYTES # BLD AUTO: 1.1 10E3/UL (ref 0–1.3)
MONOCYTES NFR BLD AUTO: 12 %
NEUTROPHILS # BLD AUTO: 5.8 10E3/UL (ref 1.6–8.3)
NEUTROPHILS NFR BLD AUTO: 63 %
NONHDLC SERPL-MCNC: 152 MG/DL
NRBC # BLD AUTO: 0 10E3/UL
NRBC BLD AUTO-RTO: 0 /100
NT-PROBNP SERPL-MCNC: 710 PG/ML (ref 0–900)
P AXIS - MUSE: 45 DEGREES
PLATELET # BLD AUTO: 255 10E3/UL (ref 150–450)
POTASSIUM SERPL-SCNC: 3.6 MMOL/L (ref 3.4–5.3)
PR INTERVAL - MUSE: 214 MS
QRS DURATION - MUSE: 94 MS
QT - MUSE: 514 MS
QTC - MUSE: 543 MS
R AXIS - MUSE: 37 DEGREES
RBC # BLD AUTO: 4.37 10E6/UL (ref 4.4–5.9)
SODIUM SERPL-SCNC: 140 MMOL/L (ref 135–145)
SYSTOLIC BLOOD PRESSURE - MUSE: NORMAL MMHG
T AXIS - MUSE: 72 DEGREES
TRIGL SERPL-MCNC: 187 MG/DL
TROPONIN T SERPL HS-MCNC: 32 NG/L
VENTRICULAR RATE- MUSE: 67 BPM
WBC # BLD AUTO: 9.2 10E3/UL (ref 4–11)

## 2024-09-05 PROCEDURE — 250N000013 HC RX MED GY IP 250 OP 250 PS 637: Performed by: INTERNAL MEDICINE

## 2024-09-05 PROCEDURE — 99223 1ST HOSP IP/OBS HIGH 75: CPT | Performed by: PHYSICIAN ASSISTANT

## 2024-09-05 PROCEDURE — 71046 X-RAY EXAM CHEST 2 VIEWS: CPT

## 2024-09-05 PROCEDURE — 85610 PROTHROMBIN TIME: CPT | Performed by: EMERGENCY MEDICINE

## 2024-09-05 PROCEDURE — 250N000013 HC RX MED GY IP 250 OP 250 PS 637: Performed by: PHYSICIAN ASSISTANT

## 2024-09-05 PROCEDURE — 99207 PR APP CREDIT; MD BILLING SHARED VISIT: CPT | Performed by: HOSPITALIST

## 2024-09-05 PROCEDURE — 83880 ASSAY OF NATRIURETIC PEPTIDE: CPT | Performed by: PHYSICIAN ASSISTANT

## 2024-09-05 PROCEDURE — 70553 MRI BRAIN STEM W/O & W/DYE: CPT

## 2024-09-05 PROCEDURE — 5A09357 ASSISTANCE WITH RESPIRATORY VENTILATION, LESS THAN 24 CONSECUTIVE HOURS, CONTINUOUS POSITIVE AIRWAY PRESSURE: ICD-10-PCS | Performed by: HOSPITALIST

## 2024-09-05 PROCEDURE — 250N000009 HC RX 250: Performed by: EMERGENCY MEDICINE

## 2024-09-05 PROCEDURE — 80048 BASIC METABOLIC PNL TOTAL CA: CPT | Performed by: EMERGENCY MEDICINE

## 2024-09-05 PROCEDURE — 92507 TX SP LANG VOICE COMM INDIV: CPT | Mod: GN | Performed by: SPEECH-LANGUAGE PATHOLOGIST

## 2024-09-05 PROCEDURE — 92610 EVALUATE SWALLOWING FUNCTION: CPT | Mod: GN | Performed by: SPEECH-LANGUAGE PATHOLOGIST

## 2024-09-05 PROCEDURE — 999N000157 HC STATISTIC RCP TIME EA 10 MIN

## 2024-09-05 PROCEDURE — 36415 COLL VENOUS BLD VENIPUNCTURE: CPT | Performed by: PHYSICIAN ASSISTANT

## 2024-09-05 PROCEDURE — 80061 LIPID PANEL: CPT | Performed by: PHYSICIAN ASSISTANT

## 2024-09-05 PROCEDURE — 83036 HEMOGLOBIN GLYCOSYLATED A1C: CPT | Performed by: PHYSICIAN ASSISTANT

## 2024-09-05 PROCEDURE — 272N000064 HC CIRCUIT HUMIDITY W/CPAP BIPAP

## 2024-09-05 PROCEDURE — 83735 ASSAY OF MAGNESIUM: CPT | Performed by: PHYSICIAN ASSISTANT

## 2024-09-05 PROCEDURE — 36415 COLL VENOUS BLD VENIPUNCTURE: CPT | Performed by: EMERGENCY MEDICINE

## 2024-09-05 PROCEDURE — 0042T CT HEAD PERFUSION W CONTRAST: CPT

## 2024-09-05 PROCEDURE — 85025 COMPLETE CBC W/AUTO DIFF WBC: CPT | Performed by: EMERGENCY MEDICINE

## 2024-09-05 PROCEDURE — A9585 GADOBUTROL INJECTION: HCPCS

## 2024-09-05 PROCEDURE — 94660 CPAP INITIATION&MGMT: CPT

## 2024-09-05 PROCEDURE — 70496 CT ANGIOGRAPHY HEAD: CPT

## 2024-09-05 PROCEDURE — 84484 ASSAY OF TROPONIN QUANT: CPT | Performed by: EMERGENCY MEDICINE

## 2024-09-05 PROCEDURE — 99291 CRITICAL CARE FIRST HOUR: CPT | Mod: FS | Performed by: PSYCHIATRY & NEUROLOGY

## 2024-09-05 PROCEDURE — 85730 THROMBOPLASTIN TIME PARTIAL: CPT | Performed by: EMERGENCY MEDICINE

## 2024-09-05 PROCEDURE — 250N000013 HC RX MED GY IP 250 OP 250 PS 637: Performed by: HOSPITALIST

## 2024-09-05 PROCEDURE — 99291 CRITICAL CARE FIRST HOUR: CPT

## 2024-09-05 PROCEDURE — 272N000063 HC CIRCUIT HUMID FACE/TRACH MSK

## 2024-09-05 PROCEDURE — 255N000002 HC RX 255 OP 636

## 2024-09-05 PROCEDURE — 70450 CT HEAD/BRAIN W/O DYE: CPT

## 2024-09-05 PROCEDURE — 250N000013 HC RX MED GY IP 250 OP 250 PS 637: Performed by: EMERGENCY MEDICINE

## 2024-09-05 PROCEDURE — 99223 1ST HOSP IP/OBS HIGH 75: CPT | Mod: FS

## 2024-09-05 PROCEDURE — 120N000001 HC R&B MED SURG/OB

## 2024-09-05 PROCEDURE — 250N000011 HC RX IP 250 OP 636: Performed by: EMERGENCY MEDICINE

## 2024-09-05 PROCEDURE — 93005 ELECTROCARDIOGRAM TRACING: CPT

## 2024-09-05 PROCEDURE — 99292 CRITICAL CARE ADDL 30 MIN: CPT | Mod: FS | Performed by: PSYCHIATRY & NEUROLOGY

## 2024-09-05 RX ORDER — ACETAMINOPHEN 325 MG/1
650 TABLET ORAL EVERY 4 HOURS PRN
Status: DISCONTINUED | OUTPATIENT
Start: 2024-09-05 | End: 2024-09-06 | Stop reason: HOSPADM

## 2024-09-05 RX ORDER — POTASSIUM CHLORIDE 1500 MG/1
40 TABLET, EXTENDED RELEASE ORAL 2 TIMES DAILY
Status: DISCONTINUED | OUTPATIENT
Start: 2024-09-05 | End: 2024-09-06 | Stop reason: HOSPADM

## 2024-09-05 RX ORDER — CETIRIZINE HYDROCHLORIDE 10 MG/1
10 TABLET ORAL EVERY EVENING
Status: DISCONTINUED | OUTPATIENT
Start: 2024-09-05 | End: 2024-09-06 | Stop reason: HOSPADM

## 2024-09-05 RX ORDER — GADOBUTROL 604.72 MG/ML
13 INJECTION INTRAVENOUS ONCE
Status: COMPLETED | OUTPATIENT
Start: 2024-09-05 | End: 2024-09-05

## 2024-09-05 RX ORDER — FINASTERIDE 1 MG/1
1 TABLET, FILM COATED ORAL DAILY
Status: DISCONTINUED | OUTPATIENT
Start: 2024-09-06 | End: 2024-09-06 | Stop reason: HOSPADM

## 2024-09-05 RX ORDER — SOTALOL HYDROCHLORIDE 80 MG/1
80 TABLET ORAL EVERY 12 HOURS
Status: DISCONTINUED | OUTPATIENT
Start: 2024-09-05 | End: 2024-09-06 | Stop reason: HOSPADM

## 2024-09-05 RX ORDER — ACETAMINOPHEN 650 MG/1
650 SUPPOSITORY RECTAL EVERY 4 HOURS PRN
Status: DISCONTINUED | OUTPATIENT
Start: 2024-09-05 | End: 2024-09-06 | Stop reason: HOSPADM

## 2024-09-05 RX ORDER — HYDRALAZINE HYDROCHLORIDE 20 MG/ML
10-20 INJECTION INTRAMUSCULAR; INTRAVENOUS
Status: DISCONTINUED | OUTPATIENT
Start: 2024-09-05 | End: 2024-09-06 | Stop reason: HOSPADM

## 2024-09-05 RX ORDER — AMOXICILLIN 250 MG
2 CAPSULE ORAL
Status: DISCONTINUED | OUTPATIENT
Start: 2024-09-05 | End: 2024-09-06 | Stop reason: HOSPADM

## 2024-09-05 RX ORDER — IOPAMIDOL 755 MG/ML
117 INJECTION, SOLUTION INTRAVASCULAR ONCE
Status: COMPLETED | OUTPATIENT
Start: 2024-09-05 | End: 2024-09-05

## 2024-09-05 RX ORDER — LABETALOL HYDROCHLORIDE 5 MG/ML
10-20 INJECTION, SOLUTION INTRAVENOUS EVERY 10 MIN PRN
Status: DISCONTINUED | OUTPATIENT
Start: 2024-09-05 | End: 2024-09-06 | Stop reason: HOSPADM

## 2024-09-05 RX ORDER — ALLOPURINOL 300 MG/1
300 TABLET ORAL DAILY
Status: DISCONTINUED | OUTPATIENT
Start: 2024-09-06 | End: 2024-09-06 | Stop reason: HOSPADM

## 2024-09-05 RX ORDER — LIDOCAINE 40 MG/G
CREAM TOPICAL
Status: DISCONTINUED | OUTPATIENT
Start: 2024-09-05 | End: 2024-09-06 | Stop reason: HOSPADM

## 2024-09-05 RX ORDER — LORAZEPAM 1 MG/1
1 TABLET ORAL
Status: COMPLETED | OUTPATIENT
Start: 2024-09-05 | End: 2024-09-05

## 2024-09-05 RX ORDER — BUMETANIDE 1 MG/1
2 TABLET ORAL 2 TIMES DAILY
Status: DISCONTINUED | OUTPATIENT
Start: 2024-09-05 | End: 2024-09-06 | Stop reason: HOSPADM

## 2024-09-05 RX ORDER — ONDANSETRON 4 MG/1
4 TABLET, ORALLY DISINTEGRATING ORAL EVERY 6 HOURS PRN
Status: DISCONTINUED | OUTPATIENT
Start: 2024-09-05 | End: 2024-09-06 | Stop reason: HOSPADM

## 2024-09-05 RX ORDER — CHLORTHALIDONE 25 MG/1
25 TABLET ORAL DAILY
Status: DISCONTINUED | OUTPATIENT
Start: 2024-09-05 | End: 2024-09-06 | Stop reason: HOSPADM

## 2024-09-05 RX ORDER — MAGNESIUM OXIDE 400 MG/1
400 TABLET ORAL EVERY 4 HOURS
Status: COMPLETED | OUTPATIENT
Start: 2024-09-05 | End: 2024-09-05

## 2024-09-05 RX ORDER — ACETAMINOPHEN 325 MG/10.15ML
650 LIQUID ORAL EVERY 4 HOURS PRN
Status: DISCONTINUED | OUTPATIENT
Start: 2024-09-05 | End: 2024-09-06 | Stop reason: HOSPADM

## 2024-09-05 RX ORDER — POTASSIUM CHLORIDE 1.5 G/1.58G
20 POWDER, FOR SOLUTION ORAL ONCE
Status: COMPLETED | OUTPATIENT
Start: 2024-09-05 | End: 2024-09-05

## 2024-09-05 RX ORDER — SPIRONOLACTONE 25 MG/1
25 TABLET ORAL DAILY
Status: DISCONTINUED | OUTPATIENT
Start: 2024-09-05 | End: 2024-09-06 | Stop reason: HOSPADM

## 2024-09-05 RX ORDER — ONDANSETRON 2 MG/ML
4 INJECTION INTRAMUSCULAR; INTRAVENOUS EVERY 6 HOURS PRN
Status: DISCONTINUED | OUTPATIENT
Start: 2024-09-05 | End: 2024-09-06 | Stop reason: HOSPADM

## 2024-09-05 RX ADMIN — BUMETANIDE 2 MG: 1 TABLET ORAL at 20:10

## 2024-09-05 RX ADMIN — IOPAMIDOL 117 ML: 755 INJECTION, SOLUTION INTRAVENOUS at 08:45

## 2024-09-05 RX ADMIN — Medication 400 MG: at 13:16

## 2024-09-05 RX ADMIN — CETIRIZINE HYDROCHLORIDE 10 MG: 10 TABLET, FILM COATED ORAL at 20:11

## 2024-09-05 RX ADMIN — LORAZEPAM 1 MG: 1 TABLET ORAL at 10:25

## 2024-09-05 RX ADMIN — SENNOSIDES AND DOCUSATE SODIUM 2 TABLET: 50; 8.6 TABLET ORAL at 22:35

## 2024-09-05 RX ADMIN — Medication 5 MG: at 22:35

## 2024-09-05 RX ADMIN — POTASSIUM CHLORIDE 40 MEQ: 1500 TABLET, EXTENDED RELEASE ORAL at 20:10

## 2024-09-05 RX ADMIN — POTASSIUM CHLORIDE 20 MEQ: 1.5 POWDER, FOR SOLUTION ORAL at 13:16

## 2024-09-05 RX ADMIN — GADOBUTROL 13 ML: 604.72 INJECTION INTRAVENOUS at 10:46

## 2024-09-05 RX ADMIN — Medication 400 MG: at 16:13

## 2024-09-05 RX ADMIN — ACETAMINOPHEN 650 MG: 325 SUSPENSION ORAL at 22:35

## 2024-09-05 RX ADMIN — SODIUM CHLORIDE 100 ML: 9 INJECTION, SOLUTION INTRAVENOUS at 08:46

## 2024-09-05 ASSESSMENT — ACTIVITIES OF DAILY LIVING (ADL)
ADLS_ACUITY_SCORE: 37
ADLS_ACUITY_SCORE: 39
ADLS_ACUITY_SCORE: 37
ADLS_ACUITY_SCORE: 39
ADLS_ACUITY_SCORE: 37
ADLS_ACUITY_SCORE: 39
ADLS_ACUITY_SCORE: 39

## 2024-09-05 NOTE — PHARMACY-ADMISSION MEDICATION HISTORY
Pharmacist Admission Medication History    Admission medication history is complete. The information provided in this note is only as accurate as the sources available at the time of the update.    Information Source(s): Patient via in-person    Pertinent Information: Xarelto not filled since 9/9/2023, but pt states he is taking.    Medication History Completed By: Wilma Reina RPH 9/5/2024 10:59 AM    PTA Med List   Medication Sig Last Dose    allopurinol (ZYLOPRIM) 300 MG tablet Take 1 tablet (300 mg) by mouth daily 9/5/2024    bumetanide (BUMEX) 1 MG tablet 2 mg am and 2 mg pm (Patient taking differently: Take 2 mg by mouth 2 times daily.) 9/5/2024 at am    cetirizine (ZYRTEC) 10 MG tablet Take 10 mg by mouth every evening 9/4/2024    finasteride (PROPECIA) 1 MG tablet Take 1 tablet (1 mg) by mouth daily 9/5/2024    losartan (COZAAR) 25 MG tablet Take 1 tablet (25 mg) by mouth daily (Patient taking differently: Take 12.5 mg by mouth daily.) 9/5/2024    potassium chloride pushpa ER (KLOR-CON M20) 20 MEQ CR tablet Take 2 tablets (40 mEq) by mouth 2 times daily 9/5/2024 at am    rivaroxaban ANTICOAGULANT (XARELTO ANTICOAGULANT) 20 MG TABS tablet Take 1 tablet (20 mg) by mouth daily (with dinner) 9/5/2024    sotalol (BETAPACE) 80 MG tablet Take 1 tablet (80 mg) by mouth every 12 hours 9/5/2024 at am   Wilma Reina PharmD

## 2024-09-05 NOTE — ED PROVIDER NOTES
Emergency Department Note      History of Present Illness     Chief Complaint   Stroke Symptoms    HPI   Mike Schultz is a 73 year old male with history of hypertension, hyperlipidemia, heart failure, Bell's palsy, and atrial fibrillation anticoagulated on Xarelto who presents with his wife for stroke symptoms. Wife reports that the patient woke up this morning around 7:00am and she noticed slurred speech, facial droop, and left-sided weakness. She last saw him well last night around 10:00pm but patient mentions that he woke up this morning around 2:30am to eat cereal and he felt normal. Wife denies the patient complaining of chest pain, shortness of breath, fever, cough, sore throat, or new leg swelling. Brayden denies headache, vision changes, or numbness or weakness in his arms or his legs. He mentions history of right-sided Bell's palsy. Wife says that the patient underwent an ablation last week for atrial fibrillation and patient says that he last took his Xarelto this morning around 8:00am.     Independent Historian   Wife as detailed above.    Review of External Notes   None    Past Medical History     Medical History and Problem List   Heart failure with preserved ejection fraction  Alcohol use disorder in remission  Amiodarone pulmonary toxicity  Aortic stenosis  Basal cell carcinoma  Bell's palsy  Hyperlipidemia  Hypertension  Atrial fibrillation  Pulmonary nodules  Sleep apnea  Bilateral carpal tunnel syndrome   Gout   BOWEN   Stage 3 CKD   Cardiorenal syndrome   Ascending aorta dilatation    Medications   Allopurinol  Bumetanide  Chlorthalidone  Finasteride   Losartan   Potassium chloride pushpa   Psyllium  Xarelto   Sotalol   Spironolactone     Surgical History   Cardiac ablation  Achilles tendon repair  Multiple cardioversion's  Basal cell carcinoma excision  Right heart catherization  Umbilical hernia repair   Right total knee arthroscopy   Left total knee arthroplasty   Blepharoplasty     Physical  "Exam     Patient Vitals for the past 24 hrs:   BP Temp Temp src Pulse Resp SpO2 Height Weight   09/05/24 1132 114/73 98  F (36.7  C) Oral 63 16 93 % -- --   09/05/24 1003 -- -- -- 64 14 -- -- --   09/05/24 0957 126/70 -- -- 65 19 -- -- --   09/05/24 0945 -- -- -- 65 17 94 % -- --   09/05/24 0915 -- -- -- 68 19 95 % -- --   09/05/24 0913 -- -- -- 63 20 95 % -- --   09/05/24 0912 -- -- -- 64 20 95 % -- --   09/05/24 0911 -- -- -- 67 22 94 % -- --   09/05/24 0908 -- -- -- 68 20 95 % -- --   09/05/24 0907 -- -- -- 70 15 95 % -- --   09/05/24 0906 (!) 142/83 -- -- 73 20 91 % -- --   09/05/24 0850 (!) 151/83 -- -- 70 16 98 % -- --   09/05/24 0841 (!) 149/84 -- -- -- -- -- -- --   09/05/24 0840 -- -- -- 70 15 93 % 1.727 m (5' 8\") 133.4 kg (294 lb)   09/05/24 0831 -- 98.2  F (36.8  C) Temporal 77 18 94 % 1.727 m (5' 8\") 133.4 kg (294 lb)     Physical Exam  General: Alert, appears well-developed and well-nourished. Cooperative.     In moderate distress  HEENT:  Head:  Atraumatic  Ears:  External ears are normal  Mouth/Throat:  Oropharynx is without erythema or exudate and mucous membranes are dry.   Eyes:   Conjunctivae normal and EOM are normal. No scleral icterus.  CV:  Normal rate, regular rhythm, normal heart sounds and radial pulses are 2+ and symmetric.   Resp:  Breath sounds are clear bilaterally    Non-labored, no retractions or accessory muscle use  GI:  Obese.  Abdomen is soft, no distension, no tenderness. No rebound or guarding.  No CVA tenderness bilaterally  MS:  Normal range of motion.  Trace bilateral lower ext edema.    Normal strength in all 4 extremities.     Back atraumatic.    No midline cervical, thoracic, or lumbar tenderness  Skin:  Warm and dry.  No rash or lesions noted.  Neuro:   Alert. Normal strength.  Slurred speech, left sided facial droop.  Sensation intact in all 4 extremities. GCS: 15  beyond facial droop on left side, remainder of Cranial nerves intact.   Psych: Normal mood and " affect.    Diagnostics     Lab Results   Labs Ordered and Resulted from Time of ED Arrival to Time of ED Departure   BASIC METABOLIC PANEL - Abnormal       Result Value    Sodium 140      Potassium 3.6      Chloride 102      Carbon Dioxide (CO2) 27      Anion Gap 11      Urea Nitrogen 16.3      Creatinine 1.04      GFR Estimate 76      Calcium 9.1      Glucose 104 (*)    INR - Abnormal    INR 1.97 (*)    TROPONIN T, HIGH SENSITIVITY - Abnormal    Troponin T, High Sensitivity 32 (*)    CBC WITH PLATELETS AND DIFFERENTIAL - Abnormal    WBC Count 9.2      RBC Count 4.37 (*)     Hemoglobin 14.3      Hematocrit 43.7            MCH 32.7      MCHC 32.7      RDW 15.2 (*)     Platelet Count 255      % Neutrophils 63      % Lymphocytes 21      % Monocytes 12      % Eosinophils 3      % Basophils 1      % Immature Granulocytes 0      NRBCs per 100 WBC 0      Absolute Neutrophils 5.8      Absolute Lymphocytes 2.0      Absolute Monocytes 1.1      Absolute Eosinophils 0.3      Absolute Basophils 0.1      Absolute Immature Granulocytes 0.0      Absolute NRBCs 0.0     PARTIAL THROMBOPLASTIN TIME - Normal    aPTT 37     MAGNESIUM - Normal    Magnesium 1.7       Imaging   XR Chest 2 Views   Preliminary Result   IMPRESSION: No significant interval change. Lungs clear. No pleural   effusions.      MR Brain w/o & w Contrast   Final Result   IMPRESSION:     1. Acute/subacute appearing infarct in the posterior right frontal   lobe. No evidence of hemorrhagic transformation.    2. Mild nonspecific white matter changes which are likely due to   chronic microvascular ischemic disease.       KAILASH MARINA MD            SYSTEM ID:  [id number]      CT Head Perfusion w Contrast - For Tier 2 Stroke   Final Result   IMPRESSION:    1. Patent arteries in the neck without evidence of dissection. Mild   atherosclerotic disease in the carotid arteries bilaterally without   significant stenosis by NASCET criteria. Moderate stenosis at the   origin  of the left vertebral artery.   2. Patent proximal major intracranial arteries without vascular   cutoff. Mild-to-moderate stenoses of the cavernous and supraclinoid   ICAs from atherosclerotic disease. No aneurysm identified.   3. CT perfusion images suggests possible bandlike area of   ischemia/infarct in the posterior right frontal lobe region. This   would likely be of a distal right M3/M4 vessel which is not well   evaluated by this CTA.      Results discussed with Dale Giordano at 9:05 AM on 9/5/2024.      KAILASH MARINA MD            SYSTEM ID:  [id number]      CTA Head Neck with Contrast   Final Result   IMPRESSION:    1. Patent arteries in the neck without evidence of dissection. Mild   atherosclerotic disease in the carotid arteries bilaterally without   significant stenosis by NASCET criteria. Moderate stenosis at the   origin of the left vertebral artery.   2. Patent proximal major intracranial arteries without vascular   cutoff. Mild-to-moderate stenoses of the cavernous and supraclinoid   ICAs from atherosclerotic disease. No aneurysm identified.   3. CT perfusion images suggests possible bandlike area of   ischemia/infarct in the posterior right frontal lobe region. This   would likely be of a distal right M3/M4 vessel which is not well   evaluated by this CTA.      Results discussed with Dale Giordano at 9:05 AM on 9/5/2024.      KAILASH MARINA MD            SYSTEM ID:  [id number]      CT Head w/o Contrast   Final Result   IMPRESSION: No evidence of acute intracranial hemorrhage, mass, or   herniation.         KAILASH MARINA MD            SYSTEM ID:  [id number]      Echocardiogram Complete - For age > 60 yrs    (Results Pending)     EKG   ECG results from 09/05/24   EKG 12-lead, tracing only     Value    Systolic Blood Pressure     Diastolic Blood Pressure     Ventricular Rate 67    Atrial Rate 67    GA Interval 214    QRS Duration 94        QTc 543    P Axis 45    R AXIS 37    T Axis 72    Interpretation  ECG      Sinus rhythm with 1st degree A-V block  Prolonged QT  Abnormal ECG  When compared with ECG of 09-Aug-2024 07:24, there was no significant change          Independent Interpretation   CT Head: No intracranial hemorrhage.    ED Course      Medications Administered   Medications   allopurinol (ZYLOPRIM) tablet 300 mg (has no administration in time range)   bumetanide (BUMEX) tablet 2 mg (has no administration in time range)   cetirizine (zyrTEC) tablet 10 mg (has no administration in time range)   chlorthalidone (HYGROTON) tablet 25 mg ( Oral Automatically Held 9/8/24 0900)   finasteride (PROPECIA) tablet 1 mg (has no administration in time range)   losartan (COZAAR) half-tab 12.5 mg ( Oral Automatically Held 9/9/24 0900)   potassium chloride pushpa ER (KLOR-CON M20) CR tablet 40 mEq (has no administration in time range)   rivaroxaban ANTICOAGULANT (XARELTO) tablet 20 mg (has no administration in time range)   sotalol (BETAPACE) tablet 80 mg ( Oral Automatically Held 9/8/24 2000)   spironolactone (ALDACTONE) tablet 25 mg ( Oral Automatically Held 9/8/24 0900)   lidocaine 1 % 0.1-1 mL (has no administration in time range)   lidocaine (LMX4) cream (has no administration in time range)   sodium chloride (PF) 0.9% PF flush 3 mL (3 mLs Intracatheter $Given 9/5/24 1316)   sodium chloride (PF) 0.9% PF flush 3 mL (has no administration in time range)   labetalol (NORMODYNE/TRANDATE) injection 10-20 mg (has no administration in time range)     Or   hydrALAZINE (APRESOLINE) injection 10-20 mg (has no administration in time range)   ondansetron (ZOFRAN ODT) ODT tab 4 mg (has no administration in time range)     Or   ondansetron (ZOFRAN) injection 4 mg (has no administration in time range)   acetaminophen (TYLENOL) tablet 650 mg (has no administration in time range)     Or   acetaminophen (TYLENOL) oral liquid 650 mg (has no administration in time range)     Or   acetaminophen (TYLENOL) Suppository 650 mg (has no  administration in time range)   magnesium oxide (MAG-OX) tablet 400 mg (400 mg Oral $Given 9/5/24 1316)   iopamidol (ISOVUE-370) solution 117 mL (117 mLs Intravenous $Given 9/5/24 0845)     And   sodium chloride 0.9 % bag for CT scan flush use (100 mLs As instructed $Given 9/5/24 0846)   LORazepam (ATIVAN) tablet 1 mg (1 mg Oral $Given 9/5/24 1025)   gadobutrol (GADAVIST) injection 13 mL (13 mLs Intravenous $Given 9/5/24 1046)   potassium chloride (KLOR-CON) Packet 20 mEq (20 mEq Oral or Feeding Tube $Given 9/5/24 1316)     Procedures   None      Discussion of Management   Hospitalist team member, Cindy Scott PA-C who is admitting under Dr. Cunningham.    ED Course   ED Course as of 09/05/24 1522   Thu Sep 05, 2024   0842 I evaluated the patient, obtained history, and performed a physical exam as detailed above. Tier 2 code stroke was called.    0844 I consulted with Renate ZARCO from the stroke/neurology team.   0906 I consulted with reading radiologist.    0910 I consulted with Renate ZARCO from the stroke/neurology team.    0943 I consulted with hospitalist team member Cindy Scott PA-C. She is admitting under Dr. Cunningham.     Additional Documentation  None    Medical Decision Making / Diagnosis     CMS Diagnoses: The patient has stroke symptoms:           ED Stroke specific documentation           NIHSS PDF     Patient last known well time: 0230  ED Provider first to bedside at: 0842  CT Results received at: 0906    Thrombolytics:   Not given due to:   - unclear or unfavorable risk-benefit profile for extended window thrombolysis beyond the conventional 4.5 hour time window    If treating with thrombolytics: Ensure SBP<180 and DBP<105 prior to treatment with thrombolytics.  Administering thrombolytics after treatment with IV labetalol, hydralazine, or nicardipine is reasonable once BP control is established.    Endovascular Retrieval:  Not initiated due to absence of proximal vessel occlusion    [employer] Stroke Scale (Baseline)  Time Performed: 0842     Score    Level of consciousness: (0)   Alert, keenly responsive    LOC questions: (0)   Answers both questions correctly    LOC commands: (0)   Performs both tasks correctly    Best gaze: (0)   Normal    Visual: (0)   No visual loss    Facial palsy: (2)   Partial paralysis (total/near total of lower face)    Motor arm (left): (0)   No drift    Motor arm (right): (0)   No drift    Motor leg (left): (0)   No drift    Motor leg (right): (0)   No drift    Limb ataxia: (0)   Absent    Sensory: (0)   Normal- no sensory loss    Best language: (0)   Normal- no aphasia    Dysarthria: (1)   Mild to moderate dysarthria    Extinction and inattention: (0)   No abnormality        Total Score:  3        Stroke Mimics were considered (including migraine headache, seizure disorder, hypoglycemia (or hyperglycemia), head or spinal trauma, CNS infection, Toxin ingestion and shock state (e.g. sepsis) .       MIPS   None    MDM   Mike Schultz is a 73 year old male with a history of hypertension, hyperlipidemia, heart failure, Bell's palsy, and atrial fibrillation chronically anticoagulated with Xarelto who presents with strokelike symptoms with slurred speech and facial droop.  Patient developed symptoms approximately 2:30 AM this morning.  He initially had a tier 2 stroke code activated.  Patient was sent for CT imaging of the head as well as CTA imaging of the head and neck.  In discussion with neuroradiology patient appears to have a bandlike area of ischemia or infarct in the posterior right frontal lobe region.  This could be in the distribution of the distal right M3/M4 vessel which is not ultimately well-evaluated by the CTA.  In discussion with stroke neurology given the timing of symptom onset he does not qualify for TNK or other acute emergent interventions.  They had recommended MRI imaging of the brain and continuation of the patient's Xarelto at this  time.  MRI of the brain does ultimately show an acute/subacute appearing infarct in the posterior right frontal lobe.  No evidence of hemorrhagic transformation.  Patient case was discussed with the hospitalist service who agreed to admission for further management of acute CVA.  Patient did have a EKG showing normal sinus rhythm with no arrhythmias nor acute ischemic changes.  Remainder of laboratory work appears to be baseline for the patient.  Initial troponin returned within a indeterminate range at 32.  Will plan for serial troponin studies upon admission.  Admitted under the care of the hospitalist service    Disposition   The patient was admitted to the hospital.     Diagnosis     ICD-10-CM    1. Acute CVA (cerebrovascular accident) (H)  I63.9       2. Facial droop  R29.810     Left      3. Dysarthria  R47.1          Scribe Disclosure:  Mignon MEDELLIN, am serving as a scribe at 9:27 AM on 9/5/2024 to document services personally performed by Dale Giordano MD based on my observations and the provider's statements to me.        Dale Giordano MD  09/05/24 1524

## 2024-09-05 NOTE — CONSULTS
[facility]    Cardiology Consultation     Date of Admission:  9/5/2024    Assessment & Plan   Mike Schultz is a 73 year old male who was admitted on 9/5/2024.    Heart failure with preserved ejection fraction  Recent prolonged hospitalization for acute decompensated heart failure managed with Bumex infusion and guidance of nephrology.  ProBNP 710  CXR 9/5 shows clear lungs, no pleural effusions  Admission weight 294 pounds (was discharged at 294 pounds on 8/9). Was down to 287 post hospitalization, however Cr was severely elevated and diuretics were held. Dry weight likely closer to 290-294.   Echocardiogram 8/5/2024: LVEF 55-60% with moderate concentric LV hypertrophy.   PTA Bumex 2 mg in AM & 1 mg in afternoon, losartan 12.5 mg. Spironolactone and chlorthalidone on hold with recent ROBIN.  Volume status difficult to assess, does have   Moderate to severe aortic stenosis   Seen by structural cardiology team in 5/2024 with no recommendation for intervention yet.  Echo from 8/5/24: Aortic valve with moderate-severe stenosis. Vmax 3.5 m/sec, mean gradient 31 mmHg, MARILYN 0.9 cm2   Hypertension  PTA Spironolactone, Chlorthalidone, Losartan, Sotalol. Now on hold given acute CVA and allowing for permissive HTN  Persistent atrial fibrillation  EKG from 9/5 with prolonged QTc 543 msec. Appears to be intermittently prolonged. EKGs obtained at beginning of previous hospitalization (571 msec 7/31).   PTA on Xarelto for anticoagulation, [id number] Score at least 5  PTA sotalol on hold    Recommendations:  - Increase afternoon bumex to 2 mg with careful monitoring of kidney function   - GDMT on hold in setting of acute CVA, permissive hypertension  - Daily weight, strict I/Os, 2 gm sodium restriction  - Echo pending    High complexity     AYAN Miramontes CNP    Primary Care Physician   Ranjan Courtney    Reason for Consult   Reason for consult: I was asked by hospitalist service to evaluate this  patient for HFpEF with recent hospitalization 7/25 through 8/9, recent ROBIN due to cardiorenal syndrome and diuretics being managed by outpatient cardiology. Admitted with acute CVA. Follow for medical management.    History of Present Illness   Mike Schutlz is a 73 year old male with a past medical history of hypertension, HFpEF, gout, atrial fibrillation with history of amiodarone pulmonary toxicity, aortic stenosis, bell's palsy (right side about 2 years ago), herpes simplex, HLD, BOWEN, and pulmonary nodules who presents with stroke-like symptoms.     The patient's wife reports that he woke up around 0700 with slurred speech and facial droop. Last know well around 2200 on 9/4 although patient reports waking up around 0230 to eat a bowl of cereal and felt well at that time.     In the emergency department he was noted to be afebrile. CBC and BMP unremarkable. CT head was negative for hemorrhage.  CTA was negative for proximal LVO.  CT perfusion suggests possible ischemia in the posterior right frontal lobe which would correspond to a distal right M3/M4 vessel.  Brayden reports taking his Xarelto this morning and denies any recent skipped doses.  Presenting blood pressure was 149/84. EKG showed sinus rhythm with 1st degree AV block.     Of note, the patient was recently hospitalized 7/25-8/9 for acute on chronic HFpEF. He was treated with Bumex infusion with assistance from nephrology. A right heart catheterization on 7/31/2024 showed moderate right-sided elevated filling pressures and normal left heart pressures. On admission his weight was 310 pounds, discharge weight 294, and weight on 8/28 287 pounds. He was discharged on 3 mg Bumex BID, however post hospitalization had severe jump in creatinine to 3.26 on 8/21. His Bumex, chlorthalidone, spironolactone, and losartan were held. Recheck of Cr 1.8 on 8/26. He was subsequently restarted on Bumex at 2 mg in AM and 1 mg in the afternoon. Losartan was restarted at  12.5 mg on 8/27 by nephrology.     Today, he and wife report that from a cardiac standpoint things have been going well. His weights have crept up a little and they think his lower extremities are more edematous. He is short of breath with minimal activity at baseline. Denies any chest pain or pressure, orthopnea, or PND    Past Medical History   Past Medical History:   Diagnosis Date    (HFpEF) heart failure with preserved ejection fraction (H)     Acute gouty arthritis     Alcohol use disorder in remission     Amiodarone pulmonary toxicity     Aortic stenosis     Basal cell carcinoma     Of the nose    Bell's palsy     Herpes simplex without mention of complication     Hyperlipidemia     Hypertension 2000    Persistent atrial fibrillation (H)     persistent, DCCV 12/2017, ablation 11/6/17    Pulmonary nodules     Sleep apnea     CPAP         Past Surgical History   Past Surgical History:   Procedure Laterality Date    ABLATION OF DYSRHYTHMIC FOCUS  2018, 04/12/2019    Procedure: EP Ablation Focal AFIB;  Surgeon: Fady Day MD;  Location:  [facility]    ACHILLES TENDON SURGERY  1997    ANESTHESIA CARDIOVERSION N/A 2/26/2019    Procedure: ANESTHESIA CARDIOVERSION (CONNER);  Surgeon: GENERIC ANESTHESIA PROVIDER;  Location:  OR    ANESTHESIA CARDIOVERSION N/A 11/19/2020    Procedure: ANESTHESIA, FOR CARDIOVERSION (dr campbell);  Surgeon: GENERIC ANESTHESIA PROVIDER;  Location:  OR    ANESTHESIA CARDIOVERSION N/A 8/22/2022    Procedure: CARDIOVERSION;  Surgeon: GENERIC ANESTHESIA PROVIDER;  Location:  OR    ANESTHESIA CARDIOVERSION N/A 9/13/2023    Procedure: Anesthesia cardioversion;  Surgeon: GENERIC ANESTHESIA PROVIDER;  Location:  OR    ANESTHESIA CARDIOVERSION N/A 10/6/2023    Procedure: Anesthesia cardioversion;  Surgeon: GENERIC ANESTHESIA PROVIDER;  Location:  OR    ANESTHESIA CARDIOVERSION N/A 8/1/2024    Procedure: Anesthesia cardioversion;  Surgeon: GENERIC ANESTHESIA PROVIDER;   Location:  OR    BASAL CELL CARCINOMA EXCISION Right 2009    nose, took cartilage from inner ear.    CARDIOVERSION  2019    cardioversion for atr fib    COLONOSCOPY      COLONOSCOPY N/A 3/22/2023    Procedure: COLONOSCOPY, FLEXIBLE, WITH LESION REMOVAL USING SNARE;  Surgeon: Anne Lucero MD;  Location:  GI    CV RIGHT HEART CATH MEASUREMENTS RECORDED N/A 2024    Procedure: Right Heart Cath;  Surgeon: Adriano Lazo MD;  Location:  [facility]ARD[facility] CATH LAB    EP ABLATION FOCAL AFIB N/A 2019    Procedure: EP Ablation Focal AFIB;  Surgeon: Fady Day MD;  Location:  [facility]    EP ABLATION FOCAL AFIB N/A 2024    Procedure: Ablation Atrial Fibrilation;  Surgeon: Fady Day MD;  Location:  HE[facility]ATH [facility]    HERNIA REPAIR, UMBILICAL  2012    Procedure: HERNIORRHAPHY UMBILICAL;  UMBILICAL HERNIA REPAIR;  Surgeon: Ra Crocker MD;  Location: [facility]    HERNIORRHAPHY UMBILICAL  2012    Procedure: HERNIORRHAPHY UMBILICAL;  UMBILICAL HERNIA REPAIR;  Surgeon: Ra Crocker MD;  Location: [facility]    ORTHOPEDIC SURGERY      ORTHOPEDIC SURGERY      OTHER SURGICAL HISTORY Right 2017    total KNEE ARTHROSCOPY     TOTAL KNEE ARTHROPLASTY Left 2018    ZZC NONSPECIFIC PROCEDURE  1997    achilles tendon    ZZC NONSPECIFIC PROCEDURE      knees, arthroscopy    ZZC NONSPECIFIC PROCEDURE      basal cell skin ca, nose    ZZC NONSPECIFIC PROCEDURE      flex sig; colonoscopy due 3/2008    ZZC NONSPECIFIC PROCEDURE      cardioversion for atr fib    ZZC TOTAL KNEE ARTHROPLASTY Left 2018    Dr. Malick Suarez         Prior to Admission Medications   Prior to Admission Medications   Prescriptions Last Dose Informant Patient Reported? Taking?   allopurinol (ZYLOPRIM) 300 MG tablet 2024  No Yes   Sig: Take 1 tablet (300 mg) by mouth daily   bumetanide (BUMEX) 1 MG tablet 2024 at am  No Yes   Si mg am and 2 mg pm   Patient  taking differently: Take 2 mg by mouth 2 times daily.   cetirizine (ZYRTEC) 10 MG tablet 9/4/2024  Yes Yes   Sig: Take 10 mg by mouth every evening   chlorthalidone (HYGROTON) 25 MG tablet   No No   Sig: Take 1 tablet (25 mg) by mouth daily HOLD   Patient not taking: Reported on 8/28/2024   finasteride (PROPECIA) 1 MG tablet 9/5/2024  No Yes   Sig: Take 1 tablet (1 mg) by mouth daily   losartan (COZAAR) 25 MG tablet 9/5/2024  No Yes   Sig: Take 1 tablet (25 mg) by mouth daily   Patient taking differently: Take 12.5 mg by mouth daily.   miconazole (MICATIN) 2 % external powder   No No   Sig: Apply topically 2 times daily   Patient not taking: Reported on 8/28/2024   potassium chloride pushpa ER (KLOR-CON M20) 20 MEQ CR tablet 9/5/2024 at am  No Yes   Sig: Take 2 tablets (40 mEq) by mouth 2 times daily   psyllium (METAMUCIL/KONSYL) capsule Not Taking  Yes No   Sig: Take 1-2 capsules by mouth daily   Patient not taking: Reported on 9/5/2024   rivaroxaban ANTICOAGULANT (XARELTO ANTICOAGULANT) 20 MG TABS tablet 9/5/2024  No Yes   Sig: Take 1 tablet (20 mg) by mouth daily (with dinner)   sotalol (BETAPACE) 80 MG tablet 9/5/2024 at am  No Yes   Sig: Take 1 tablet (80 mg) by mouth every 12 hours   spironolactone (ALDACTONE) 25 MG tablet   No No   Sig: Take 1 tablet (25 mg) by mouth daily   Patient not taking: Reported on 8/28/2024      Facility-Administered Medications: None     Current Facility-Administered Medications   Medication Dose Route Frequency Provider Last Rate Last Admin    acetaminophen (TYLENOL) tablet 650 mg  650 mg Oral Q4H PRN Cindy Scott PA-C        Or    acetaminophen (TYLENOL) oral liquid 650 mg  650 mg Oral Q4H PRN Cindy Scott PA-C        Or    acetaminophen (TYLENOL) Suppository 650 mg  650 mg Rectal Q4H PRN Cindy Scott PA-C        [START ON 9/6/2024] allopurinol (ZYLOPRIM) tablet 300 mg  300 mg Oral Daily Cindy Scott PA-C        bumetanide (BUMEX) tablet 2 mg  2 mg  Oral BID Cindy Scott PA-C        cetirizine (zyrTEC) tablet 10 mg  10 mg Oral QPM Cindy Scott PA-C        [Held by provider] chlorthalidone (HYGROTON) tablet 25 mg  25 mg Oral Daily Cindy Scott PA-C        [START ON 9/6/2024] finasteride (PROPECIA) tablet 1 mg  1 mg Oral Daily Cindy Scott PA-C        labetalol (NORMODYNE/TRANDATE) injection 10-20 mg  10-20 mg Intravenous Q10 Min PRN Cindy Scott PA-C        Or    hydrALAZINE (APRESOLINE) injection 10-20 mg  10-20 mg Intravenous Q1H PRN Cindy Scott PA-C        lidocaine (LMX4) cream   Topical Q1H PRN Cindy Scott PA-C        lidocaine 1 % 0.1-1 mL  0.1-1 mL Other Q1H PRN Cindy Scott PA-C        [Held by provider] losartan (COZAAR) half-tab 12.5 mg  12.5 mg Oral Daily Cindy Scott PA-C        magnesium oxide (MAG-OX) tablet 400 mg  400 mg Oral Q4H Yomaira Cunningham MD        ondansetron (ZOFRAN ODT) ODT tab 4 mg  4 mg Oral Q6H PRN Cindy Scott PA-C        Or    ondansetron (ZOFRAN) injection 4 mg  4 mg Intravenous Q6H PRN Cindy Scott PA-C        potassium chloride (KLOR-CON) Packet 20 mEq  20 mEq Oral or Feeding Tube Once Yomaira Cunningham MD        potassium chloride pushpa ER (KLOR-CON M20) CR tablet 40 mEq  40 mEq Oral BID Cindy Scott PA-C        [START ON 9/6/2024] rivaroxaban ANTICOAGULANT (XARELTO) tablet 20 mg  20 mg Oral Daily with supper Cindy Scott PA-C        sodium chloride (PF) 0.9% PF flush 3 mL  3 mL Intracatheter Q8H Cindy Scott PA-C        sodium chloride (PF) 0.9% PF flush 3 mL  3 mL Intracatheter q1 min prn Cindy Scott PA-C        [Held by provider] sotalol (BETAPACE) tablet 80 mg  80 mg Oral Q12H Cindy Scott PA-C        [Held by provider] spironolactone (ALDACTONE) tablet 25 mg  25 mg Oral Daily Cindy Scott PA-C         Current Facility-Administered Medications   Medication Dose Route Frequency  Provider Last Rate Last Admin    acetaminophen (TYLENOL) tablet 650 mg  650 mg Oral Q4H PRN Cindy Scott PA-C        Or    acetaminophen (TYLENOL) oral liquid 650 mg  650 mg Oral Q4H PRN Cindy Scott PA-C        Or    acetaminophen (TYLENOL) Suppository 650 mg  650 mg Rectal Q4H PRN Cindy Scott PA-C        [START ON 9/6/2024] allopurinol (ZYLOPRIM) tablet 300 mg  300 mg Oral Daily Cindy Scott PA-C        bumetanide (BUMEX) tablet 2 mg  2 mg Oral BID Cindy Scott PA-C        cetirizine (zyrTEC) tablet 10 mg  10 mg Oral QPM Cindy Scott PA-C        [Held by provider] chlorthalidone (HYGROTON) tablet 25 mg  25 mg Oral Daily Cindy Scott PA-C        [START ON 9/6/2024] finasteride (PROPECIA) tablet 1 mg  1 mg Oral Daily Cindy Scott PA-C        labetalol (NORMODYNE/TRANDATE) injection 10-20 mg  10-20 mg Intravenous Q10 Min PRN Cindy Scott PA-C        Or    hydrALAZINE (APRESOLINE) injection 10-20 mg  10-20 mg Intravenous Q1H PRN Cindy Scott PA-C        lidocaine (LMX4) cream   Topical Q1H PRN Cindy Scott PA-C        lidocaine 1 % 0.1-1 mL  0.1-1 mL Other Q1H PRN Cindy Scott PA-C        [Held by provider] losartan (COZAAR) half-tab 12.5 mg  12.5 mg Oral Daily Cindy Scott PA-C        magnesium oxide (MAG-OX) tablet 400 mg  400 mg Oral Q4H Yomaira Cunningham MD        ondansetron (ZOFRAN ODT) ODT tab 4 mg  4 mg Oral Q6H PRN Cindy Scott PA-C        Or    ondansetron (ZOFRAN) injection 4 mg  4 mg Intravenous Q6H PRN Cindy Scott PA-C        potassium chloride (KLOR-CON) Packet 20 mEq  20 mEq Oral or Feeding Tube Once Yomaira Cunningham MD        potassium chloride pushpa ER (KLOR-CON M20) CR tablet 40 mEq  40 mEq Oral BID Cindy Scott PA-C        [START ON 9/6/2024] rivaroxaban ANTICOAGULANT (XARELTO) tablet 20 mg  20 mg Oral Daily with supper Cindy Scott PA-C        sodium chloride  (PF) 0.9% PF flush 3 mL  3 mL Intracatheter Q8H Cindy Scott PA-C        sodium chloride (PF) 0.9% PF flush 3 mL  3 mL Intracatheter q1 min prn Cindy Scott PA-C        [Held by provider] sotalol (BETAPACE) tablet 80 mg  80 mg Oral Q12H Cindy Scott PA-C        [Held by provider] spironolactone (ALDACTONE) tablet 25 mg  25 mg Oral Daily Cindy Scott PA-C         Allergies   Allergies   Allergen Reactions    Amiodarone Shortness Of Breath     Suspected amiodarone toxicity involving lungs       Social History    reports that he quit smoking about 13 years ago. His smoking use included cigarettes. He started smoking about 47 years ago. He has a 34.6 pack-year smoking history. He has been exposed to tobacco smoke. He has never used smokeless tobacco. He reports that he does not currently use alcohol. He reports that he does not use drugs.      Family History   I have reviewed this patient's family history and updated it with pertinent information if needed.  Family History   Problem Relation Age of Onset    Family History Negative Mother     Heart Disease Father         decesased at 42 - MI    Heart Disease Sister          MI    Lipids Sister     Lipids Sister     No Known Problems Sister     Heart Disease Brother          MI    Heart Disease Brother         CABG AT 49    Lung Cancer No family hx of           Review of Systems   A comprehensive review of system was performed and is negative other than that noted in the HPI or here.     Physical Exam   Vital Signs with Ranges  Temp:  [98  F (36.7  C)-98.2  F (36.8  C)] 98  F (36.7  C)  Pulse:  [63-77] 63  Resp:  [14-22] 16  BP: (114-151)/(70-84) 114/73  SpO2:  [91 %-98 %] 93 %  Wt Readings from Last 4 Encounters:   24 133.4 kg (294 lb)   24 130.5 kg (287 lb 12.8 oz)   08/15/24 133.5 kg (294 lb 6.4 oz)   24 133.8 kg (294 lb 14.4 oz)     No intake/output data recorded.      Vitals: /73 (BP Location:  "Left arm)   Pulse 63   Temp 98  F (36.7  C) (Oral)   Resp 16   Ht 1.727 m (5' 8\")   Wt 133.4 kg (294 lb)   SpO2 93%   BMI 44.70 kg/m      Physical Exam:   General - Alert and oriented to time place and person in no acute distress  Eyes - No scleral icterus  HEENT - Neck supple, moist mucous membranes  Cardiovascular - heart rate and rhythm are regular. +2 systolic murmur  Extremities - There is +1-2 lower extremity edema  Respiratory - Lung sounds are clear throughout  Skin - No pallor or cyanosis  Gastrointestinal - Non tender and non distended without rebound or guarding  Psych - Appropriate affect   Neurological - No gross motor neurological focal deficits        Recent Labs   Lab 09/05/24  0840 09/04/24  1021   WBC 9.2  --    HGB 14.3  --      --      --    INR 1.97*  --     140   POTASSIUM 3.6 3.8   CHLORIDE 102 102   CO2 27 26   BUN 16.3 19.3   CR 1.04 1.06   GFRESTIMATED 76 75   ANIONGAP 11 12   ASHELY 9.1 8.9   * 108*     Recent Labs   Lab Test 07/27/23  0955 10/06/21  1759   CHOL 163 173   HDL 39* 38*   LDL 97 103*   TRIG 133 161*     Recent Labs   Lab 09/05/24  0840   WBC 9.2   HGB 14.3   HCT 43.7           No results for input(s): \"PH\", \"PHV\", \"PO2\", \"PO2V\", \"SAT\", \"PCO2\", \"PCO2V\", \"HCO3\", \"HCO3V\" in the last 168 hours.  Recent Labs   Lab 09/05/24  0840 09/04/24  1021   NTBNPI 710  --    NTBNP  --  654     No results for input(s): \"DD\" in the last 168 hours.  No results for input(s): \"SED\", \"CRP\" in the last 168 hours.  Recent Labs   Lab 09/05/24  0840        No results for input(s): \"TSH\" in the last 168 hours.  No results for input(s): \"COLOR\", \"APPEARANCE\", \"URINEGLC\", \"URINEBILI\", \"URINEKETONE\", \"SG\", \"UBLD\", \"URINEPH\", \"PROTEIN\", \"UROBILINOGEN\", \"NITRITE\", \"LEUKEST\", \"RBCU\", \"WBCU\" in the last 168 hours.    Imaging:  Recent Results (from the past 48 hour(s))   CT Head w/o Contrast    Narrative    CT SCAN OF THE HEAD WITHOUT CONTRAST September 5, " 2024 8:50 AM     HISTORY: Code Stroke to evaluate for potential thrombolysis and  thrombectomy. Left-sided weakness.    TECHNIQUE: Axial images of the head and coronal reformations without  IV contrast material. Radiation dose for this scan was reduced using  automated exposure control, adjustment of the mA and/or kV according  to patient size, or iterative reconstruction technique.    COMPARISON: Brain MR 2/17/2023.    FINDINGS: There is no evidence of intracranial hemorrhage, mass, acute  infarct or anomaly. The ventricles are normal in size, shape and  configuration. The brain parenchyma and subarachnoid spaces are  normal.     The visualized portions of the sinuses and mastoids appear normal. The  bony calvarium and bones of the skull base appear intact.       Impression    IMPRESSION: No evidence of acute intracranial hemorrhage, mass, or  herniation.      KAILASH MARINA MD         SYSTEM ID:  [id number]   CTA Head Neck with Contrast    Narrative    CT ANGIOGRAM OF THE HEAD AND NECK WITH CONTRAST  CT HEAD PERFUSION WITH CONTRAST September 5, 2024 8:53 AM     HISTORY: Code Stroke to evaluate for potential thrombolysis and  thrombectomy. Left-sided weakness.    TECHNIQUE: CT angiography with an injection of 67 mL Isovue 370 IV  with scans through the head and neck. Images were transferred to a  separate 3-D workstation where multiplanar reformations and 3-D images  were created. Estimates of carotid stenoses are made relative to the  distal internal carotid artery diameters except as noted. Radiation  dose for this scan was reduced using automated exposure control,  adjustment of the mA and/or kV according to patient size, or iterative  reconstruction technique.     Perfusion scans were performed with injection of additional IV  contrast. These images were processed on a separate 3-D workstation.     COMPARISON: None.     CT HEAD FINDINGS: CT perfusion images demonstrate bandlike area of  increased T. Max and time  to drain in the posterior right frontal lobe  region concerning for possible acute ischemia/infarct. Question subtle  decrease in cerebral blood flow and cerebral blood volume in that  area.    CT ANGIOGRAM HEAD FINDINGS: Evaluation is difficult given mild motion  artifact and scanner technique. No definite vascular cut off of the  proximal ACAs, MCAs, or PCAs. Evaluation of the more distal  intracranial vessels is limited. Atherosclerotic disease along the  cavernous and supraclinoid ICAs cause mild to moderate stenoses.  Venous circulation is unremarkable.     CT ANGIOGRAM NECK FINDINGS: Scattered atherosclerotic calcification in  the aortic arch without significant stenosis at the origins of the  great vessels.     Right carotid artery: The right common and internal carotid arteries  are patent. Mild atherosclerotic disease at the carotid bifurcation  and proximal internal carotid artery without significant stenosis by  NASCET criteria.     Left carotid artery: The left common and internal carotid arteries are  patent. Mild atherosclerotic disease at the carotid bifurcation and  proximal internal carotid artery without significant stenosis by  NASCET criteria.     Vertebral arteries: Vertebral arteries are patent without evidence of  dissection. Moderate stenosis of the origin of the left vertebral  artery.    Other findings: There are degenerative changes in the spine.       Impression    IMPRESSION:   1. Patent arteries in the neck without evidence of dissection. Mild  atherosclerotic disease in the carotid arteries bilaterally without  significant stenosis by NASCET criteria. Moderate stenosis at the  origin of the left vertebral artery.  2. Patent proximal major intracranial arteries without vascular  cutoff. Mild-to-moderate stenoses of the cavernous and supraclinoid  ICAs from atherosclerotic disease. No aneurysm identified.  3. CT perfusion images suggests possible bandlike area of  ischemia/infarct in the  posterior right frontal lobe region. This  would likely be of a distal right M3/M4 vessel which is not well  evaluated by this CTA.    Results discussed with Dale Giordano at 9:05 AM on 9/5/2024.    KAILASH MARINA MD         SYSTEM ID:  [id number]   CT Head Perfusion w Contrast - For Tier 2 Stroke    Narrative    CT ANGIOGRAM OF THE HEAD AND NECK WITH CONTRAST  CT HEAD PERFUSION WITH CONTRAST September 5, 2024 8:53 AM     HISTORY: Code Stroke to evaluate for potential thrombolysis and  thrombectomy. Left-sided weakness.    TECHNIQUE: CT angiography with an injection of 67 mL Isovue 370 IV  with scans through the head and neck. Images were transferred to a  separate 3-D workstation where multiplanar reformations and 3-D images  were created. Estimates of carotid stenoses are made relative to the  distal internal carotid artery diameters except as noted. Radiation  dose for this scan was reduced using automated exposure control,  adjustment of the mA and/or kV according to patient size, or iterative  reconstruction technique.     Perfusion scans were performed with injection of additional IV  contrast. These images were processed on a separate 3-D workstation.     COMPARISON: None.     CT HEAD FINDINGS: CT perfusion images demonstrate bandlike area of  increased T. Max and time to drain in the posterior right frontal lobe  region concerning for possible acute ischemia/infarct. Question subtle  decrease in cerebral blood flow and cerebral blood volume in that  area.    CT ANGIOGRAM HEAD FINDINGS: Evaluation is difficult given mild motion  artifact and scanner technique. No definite vascular cut off of the  proximal ACAs, MCAs, or PCAs. Evaluation of the more distal  intracranial vessels is limited. Atherosclerotic disease along the  cavernous and supraclinoid ICAs cause mild to moderate stenoses.  Venous circulation is unremarkable.     CT ANGIOGRAM NECK FINDINGS: Scattered atherosclerotic calcification in  the aortic arch  without significant stenosis at the origins of the  great vessels.     Right carotid artery: The right common and internal carotid arteries  are patent. Mild atherosclerotic disease at the carotid bifurcation  and proximal internal carotid artery without significant stenosis by  NASCET criteria.     Left carotid artery: The left common and internal carotid arteries are  patent. Mild atherosclerotic disease at the carotid bifurcation and  proximal internal carotid artery without significant stenosis by  NASCET criteria.     Vertebral arteries: Vertebral arteries are patent without evidence of  dissection. Moderate stenosis of the origin of the left vertebral  artery.    Other findings: There are degenerative changes in the spine.       Impression    IMPRESSION:   1. Patent arteries in the neck without evidence of dissection. Mild  atherosclerotic disease in the carotid arteries bilaterally without  significant stenosis by NASCET criteria. Moderate stenosis at the  origin of the left vertebral artery.  2. Patent proximal major intracranial arteries without vascular  cutoff. Mild-to-moderate stenoses of the cavernous and supraclinoid  ICAs from atherosclerotic disease. No aneurysm identified.  3. CT perfusion images suggests possible bandlike area of  ischemia/infarct in the posterior right frontal lobe region. This  would likely be of a distal right M3/M4 vessel which is not well  evaluated by this CTA.    Results discussed with Dale Giordano at 9:05 AM on 9/5/2024.    KAILASH MARINA MD         SYSTEM ID:  [id number]   MR Brain w/o & w Contrast    Narrative    MRI BRAIN WITHOUT AND WITH CONTRAST  9/5/2024 10:54 AM     HISTORY:  Left facial droop, left upper extremity weakness, slurred  speech.     TECHNIQUE:  Multiplanar, multisequence MRI of the brain without and  with 13 mL Gadavist.    COMPARISON: Head CT from earlier the same day. Brain MR 2/17/2023.     FINDINGS: Bandlike area of restricted diffusion in the posterior  "right  frontal lobe with mild associated T2 hyperintensity concerning for  acute/subacute infarct. This corresponds with the abnormality seen on  prior CT perfusion images. No corresponding susceptibility  hypointensity to suggest hemorrhagic transformation of infarct. No  other territory of restricted diffusion. No mass effect or midline  shift. Ventricular size is within normal limits without evidence of  hydrocephalus. Mild patchy periventricular white matter T2  hyperintensities which are nonspecific, but most likely related to  chronic microvascular ischemic disease. No suspicious intracranial  enhancement.     The facial structures appear normal.       Impression    IMPRESSION:    1. Acute/subacute appearing infarct in the posterior right frontal  lobe. No evidence of hemorrhagic transformation.   2. Mild nonspecific white matter changes which are likely due to  chronic microvascular ischemic disease.     KAILASH MARINA MD         SYSTEM ID:  [id number]   XR Chest 2 Views    Narrative    CHEST TWO VIEWS September 5, 2024 12:08 PM     HISTORY: Hypoxia.    COMPARISON: 7/25/2024.      Impression    IMPRESSION: No significant interval change. Lungs clear. No pleural  effusions.       Echo:  No results found for this or any previous visit (from the past 4320 hour(s)).    Clinically Significant Risk Factors Present on Admission               # Drug Induced Coagulation Defect: home medication list includes an anticoagulant medication    # Hypertension: Noted on problem list  # Chronic heart failure with preserved ejection fraction: heart failure noted on problem list and last echo with EF >50%       # DMII: A1C = 6.5 % (Ref range: <5.7 %) within past 6 months    # Severe Obesity: Estimated body mass index is 44.7 kg/m  as calculated from the following:    Height as of this encounter: 1.727 m (5' 8\").    Weight as of this encounter: 133.4 kg (294 lb).       # Financial/Environmental Concerns:                Diastolic " chronic    CKD POA List: Stage 2 (GFR 60-89)    Chronic Fatigue and Other Debilities: Chronic fatigue, unspecified

## 2024-09-05 NOTE — PROGRESS NOTES
"   09/05/24 1156   Appointment Info   Signing Clinician's Name / Credentials (SLP) Kirstin Rosenthal MS CCC-SLP   General Information   Onset of Illness/Injury or Date of Surgery 09/05/24   Referring Physician Cindy Scott PA-C   Patient/Family Therapy Goal Statement (SLP) Water   Pertinent History of Current Problem \"Acute infarct posterior right frontal lobe Mike Schultz is a 73 year old male with PMH of HTN, HFpEF, gout, hx amiodarone pulmonary toxicity, aortic stenosis, bell's palsy (right side about 2 years ago), herpes simplex, HLD, atrial fibrillation, BOWEN, pulmonary nodules who presented to the ED on 9/5/24 for evaluation of stroke-like symptoms. Patient's wife states he woke up around 0700 with slurred speech, facial droop. Last well known was around 10:00 pm on 9/4, although patient states he woke up at 0230 to eat cereal and he felt normal. Patient denies chest pain, shortness of breath, fevers, cough, headache, vision changes, new limb numbness or weakness. He has a history of right sided Bell's palsy. Patient took his Xarelto this morning around 0800.\" Pt reported hx of chronic cough and throat clearing (hx of pneumonitis) with excess phlegm. Pt reported with large bites it will occasionally get stuck and he'll have to cough back up, chew again and swallow. Pt denied any aspiration symptoms.   General Observations STAT SLP orders completed   Type of Evaluation   Type of Evaluation Swallow Evaluation   Oral Motor   Oral Musculature anomalies present   Dentition (Oral Motor)   Dentition (Oral Motor) natural dentition   Facial Symmetry (Oral Motor)   Facial Symmetry (Oral Motor) left side impairment  (pt reported hx of right bells palsy with facial droop has resolved)   Left Side Facial Asymmetry moderate impairment   Lip Function (Oral Motor)   Lip Range of Motion (Oral Motor) protrusion impairment;retraction impairment   Lip Sensitivity (Oral Motor) left upper lip decreased;left lower lip decreased "   Lip Strength (Oral Motor) left side;moderate impairment   Lip Coordination (Oral Motor) left side;moderate impairment   Protrusion, Lip Range of Motion left side;moderate impairment   Retraction, Lip Range of Motion left side;moderate impairment   Tongue Function (Oral Motor)   Tongue Sensitivity (Oral Motor) left side decreased   Tongue Coordination/Speed (Oral Motor) reduced rate   Tongue ROM (Oral Motor) protrusion is impaired;retraction is impaired;lateralization is impaired   Retraction, Tongue ROM Impairment (Oral Motor) left side   Protrusion, Tongue ROM Impairment (Oral Motor) left side;moderate impairment   Lateralization, Tongue ROM Impairment (Oral Motor) left side   Facial Sensation   Facial Sensation cheek impairment   Cheek left side   Vocal Quality/Secretion Management (Oral Motor)   Vocal Quality (Oral Motor) WNL  (dysarthria noted)   Secretion Management (Oral Motor) WNL   General Swallowing Observations   Past History of Dysphagia No formal SLP notes; see hx of symptoms above   Current Diet/Method of Nutritional Intake (General Swallowing Observations, NIS) NPO   Swallowing Evaluation Clinical swallow evaluation   Clinical Swallow Evaluation   Feeding Assistance frequent cues/help required   Clinical Swallow Evaluation Textures Trialed thin liquids;pureed;solid foods   Clinical Swallow Eval: Thin Liquid Texture Trial   Mode of Presentation, Thin Liquids cup;straw;self-fed   Oral Phase of Swallow premature pharyngeal entry   Oral Residue left lip drooling   Pharyngeal Phase of Swallow impaired;coughing/choking   Diagnostic Statement Cough via straw consecutive gulps; no overt s/sx of aspiration via cup   Clinical Swallow Evaluation: Puree Solid Texture Trial   Mode of Presentation, Puree spoon;self-fed   Oral Phase, Puree delayed AP movement;effortful AP movement;residue in oral cavity   Oral Residue, Puree left lip drooling;left anterior lateral sulci   Pharyngeal Phase, Puree intact   Diagnostic  Statement Oral deficits; cleared with cues no overt s/sx of aspiration   Clinical Swallow Evaluation: Solid Food Texture Trial   Mode of Presentation self-fed   Oral Phase impaired mastication;delayed AP movement;effortful AP movement;residue in oral cavity   Oral Residue left lip drooling;left anterior lateral sulci   Pharyngeal Phase intact   Diagnostic Statement increased oral pocketing; cleared with cues with no overt s/sx of aspiration   Swallowing Recommendations   Diet Consistency Recommendations easy to chew (level 7);thin liquids (level 0)   Supervision Level for Intake 1:1 supervision needed   Mode of Delivery Recommendations bolus size, small;slow rate of intake;food moistened;place food on right side of mouth   Swallowing Maneuver Recommendations alternate food and liquid intake;double dry swallow;effortful (hard) swallow;extra swallow   Monitoring/Assistance Required (Eating/Swallowing) check mouth frequently for oral residue/pocketing;cue for finger/lingual sweep if oral pocketing present;stop eating activities when fatigue is present;monitor for cough or change in vocal quality with intake   Recommended Feeding/Eating Techniques (Swallow Eval) maintain upright sitting position for eating;maintain upright posture during/after eating for 30 minutes;minimize distractions during oral intake;moisten oral mucosa prior to intake;set-up and prepare tray   Medication Administration Recommendations, Swallowing (SLP) whole in puree   Instrumental Assessment Recommendations VFSS (videofluoroscopic swallowing study)   General Therapy Interventions   Planned Therapy Interventions Dysphagia Treatment   Dysphagia treatment Modified diet education;Instruction of safe swallow strategies;Oropharyngeal exercise training;Compensatory strategies for swallowing   Clinical Impression   Criteria for Skilled Therapeutic Interventions Met (SLP Eval) Yes, treatment indicated   SLP Diagnosis Dysphagia   Risks & Benefits of  therapy have been explained evaluation/treatment results reviewed;care plan/treatment goals reviewed;risks/benefits reviewed;current/potential barriers reviewed;participants voiced agreement with care plan;participants included;patient   Clinical Impression Comments Pt presents with mild to moderate oral dysphagia and intermittent throat clearing/cough with PO intake and unable to rule out aspiration. Pt alert, sitting EOB and agreeable to PO trials. Pt reported some drooling with intake at home, but no concern for aspiration. Oral motor exam significant for left sided weakness and reduced sensation. Pt self fed, with some left UE weakness/uncoordination, thin liquids, puree solids and regular textures. Mild anterior spillage of thin water via cup. Trialed straw, however, suspect premature spillage with immediate cough. No overt s/sx of aspiration of thin liquids via cup in isolation or after solids. Mild pocketing wtih pureed foods that increased to mild to moderate with solids and cues required to clear left buccal cavity. Education provided and strated training oral clearance and swallow strategies. Pt verbalized agreement with recommended: easy to chew diet with thin liquids (no straws) with direct supervision/assist and need to take small single sips and bites, one at a time, alternate solids and liquids, check left side for pocketing and complete oral cares after meals. If coughing, consider downgrade to mildly thick liquids.   SLP Total Evaluation Time   Eval: oral/pharyngeal swallow function, clinical swallow Minutes (52557) 20   SLP Goals   Therapy Frequency (SLP Eval) daily   SLP Predicted Duration/Target Date for Goal Attainment 09/26/24   SLP Goals Swallow   SLP: Safely tolerate diet without signs/symptoms of aspiration Regular diet;Thin liquids;With use of swallow precautions;Independently   Interventions   Interventions Quick Adds Swallowing Dysfunction   Swallowing Dysfunction &/or Oral Function for  Feeding   Treatment of Swallowing Dysfunction &/or Oral Function for Feeding Minutes (92758) 10   Symptoms Noted During/After Treatment None   Treatment Detail/Skilled Intervention Pt required max visual and verbal cues to clear left buccal cavity and take small single sips. As cues weaned, pt was able to complete more independently. Therapeutic feedback provided.   SLP Discharge Planning   SLP Plan Strategies, VFSS if not tolerating; dysarthria eval   SLP Discharge Recommendation [facility]-Motivated patient will benefit from intensive, interdisciplinary therapy.  Anticipate will be able to tolerate 3 hours of therapy per day   SLP Rationale for DC Rec Speech and swallow function below baseline; pt very motivated   SLP Brief overview of current status  easy to chew diet with thin liquids (no straws) with direct supervision/assist and need to take small single sips and bites, one at a time, alternate solids and liquids, check left side for pocketing and complete oral cares after meals. If coughing, consider downgrade to mildly thick liquids.   Total Session Time   Total Session Time (sum of timed and untimed services) 30

## 2024-09-05 NOTE — ED NOTES
Pt presents to ED with slurred speech. Facial droop, and left sided weakness that he first noticed when he woke up at 0700.  Pt last known well at 0000.

## 2024-09-05 NOTE — ED NOTES
Patient standing at bedside with writer using the urinal again, pt steady at bedside, no assistance needed asked to use call light to have staff at bedside.

## 2024-09-05 NOTE — H&P
[facility]  History and Physical - Hospitalist Service       Date of Admission:  9/5/2024  PRIMARY CARE PROVIDER:    Ranjan Courtney    Assessment & Plan   Mike Schultz is a 73 year old male with PMH of HTN, HFpEF, gout, hx amiodarone pulmonary toxicity, aortic stenosis, bell's palsy (right side about 2 years ago), herpes simplex, HLD, atrial fibrillation, BOWEN, pulmonary nodules who presented to the ED on 9/5/24 for evaluation of stroke-like symptoms.    Patient's wife states he woke up around 0700 with slurred speech, facial droop. Last well known was around 10:00 pm on 9/4, although patient states he woke up at 0230 to eat cereal and he felt normal. Patient denies chest pain, shortness of breath, fevers, cough, headache, vision changes, new limb numbness or weakness. He has a history of right sided Bell's palsy. Patient took his Xarelto this morning around 0800.    In the ED, afebrile with HR 77 and /84, saturating 94%. CBC and BMP unremarkable. Troponin 32. INR 1.97. EKG SR with 1st degree AV block, prolonged  ms. CT head NAD. CTA head and neck showed moderate left vertebral artery stenosis, mild-to-moderate stenoses of the cavernous and supraclinoid ICAs from atherosclerotic disease. CT perfusion imagines suggests possible bandlike area of ischemia/infarct in the posterior right frontal lobe region. This would likely be of a distal right M3/M4 vessel which is not well visualized by this CTA.     Acute infarct posterior right frontal lobe  *Did not receive thrombolytics due to PTA anticoagulation  - Admit to Neuro floor  - Stroke Neurology consultation  - Brain MRI w/wo showed acute/subacute appearing infarct in the posterior right frontal lobe  - Neurochecks and vital signs q4h  - Goal SBP <180 per stroke neuro  - Statin: home dose, if not on then Lipitor 40mg daily  - Telemetry, EKG  - ECHO +/- bubble pending age  - Bedside Glucose Monitoring  - A1c, Lipid Panel,  Troponin x 3  - PT/OT/SLP  - Stroke Education  - Euthermia, Euglycemia    Chronic HFpEF  Moderate to severe aortic stenosis  Acute on chronic hypoxic respiratory failure (uses 3 L at HS)  *Follows with [facility] cardiology. Recently admitted at [facility] 7/25/24-8/9/24 for acute on chronic HFpEF requiring Bumex drip and assistance from both cardiology and nephrology. Diuresis was noted to be challenging due to episodes of hypotension and ROBIN. Right heart cath 7/31/24 showed moderate elevated right heart filling pressures. Discharged on Bumex 3 mg BID.  *He was noted to have worsening ROBIN on 8/21 and his PTA bumex, chlorthalidone, spironolactone, losartan were all held. With improvement of his renal function he was restarted on lower doses of bumex 2 mg BID and losartan 12.5 mg daily 8/27.  *Current weight 294 lbs, stable from discharge 8/9 but he states he was down to around 287 lbs at home  *Currently saturating 94% on 2 L O2. He uses 3 L O2 at HS and has a prescription for O2 PRN, but is typically on RA during the day.  - Check CXR, proBNP  - Continue PTA Bumex 2 mg BID  - Hold PTA Losartan, Spironolactone, Chlorthalidone to allow room for permissive HTN in the setting of acute CVA  - Cardiology consult  - Strict I&O  - Daily weights  - Low sodium diet, 2 L fluid restriction once cleared for diet by SLP    ROBIN due to cardiorenal syndrome and diuretic use, resolved  *Creatinine peaked at 3.26 on 8/21, and his PTA bumex, chlorthalidone, spironolactone, losartan were all held. With improvement of his renal function his bumex and losartan were restarted 8/27 at lower doses.  *Creatinine currently improved down to 1.04. He has a history of wide fluctuations in renal function but baseline creatinine appears to be around 1.5. Follows with nephrology.  - Holding PTA Losartan, Spironolactone, Chlorthalidone as noted above given his acute CVA  - Repeat BMP tomorrow AM    HTN  *Most recent /73  - Continue PTA  Bumex  - Holding PTA Spironolactone, Chlorthalidone, Losartan, Sotalol given his acute CVA  - IV Hydralazine PRN SBP >180    Persistent atrial fibrillation  Long term current use of anticoagulation  *Amiodarone discontinued 11/2023 due to pneumonitis and he has failed Flecainide in the past.  *Follows with Dr. Day. He has a history of multiple ablations and cardioversions in the past which have subsequently failed. Most recent cardioversion 7/25/24 and currently in SR  *He has hx nocturnal bradycardia and pauses  - Holding PTA Sotalol given acute CVA  - Continue PTA Xarelto    Prolonged QT  *QTc today is 543 ms, was ranging 500-530 last hospitalization. Follows with EP. His PTA Metoprolol was switched to Sotalol last admission.  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 Follow up CT 03/2024 showed bilateral interstitial infiltrates with likely component of fibrosis. He was hospitalized 05/2024 with multiple issues including relapsed inflammatory lung process and put on another prolonged steroid taper by pulmonology.  *Seen by pulmonology as inpatient 8/9/24 with no concern for ongoing pneumonitis contributing to his dyspnea and his steroids were discontinued.  - Follow up with pulmonology as outpatient for PFTs and repeat CT    Chronic stable medical conditions  BOWEN  History of alcohol use with intermittent spells of sobriety  Bell's palsy (right side, about 2 years ago)  Lung nodules  BPH  Severe obesity    Clinically Significant Risk Factors Present on Admission               # Drug Induced Coagulation Defect: home medication list includes an anticoagulant medication    # Hypertension: Noted on problem list  # Chronic heart failure with preserved ejection fraction: heart failure noted on problem list and last echo with EF >50%        #  "Severe Obesity: Estimated body mass index is 44.7 kg/m  as calculated from the following:    Height as of this encounter: 1.727 m (5' 8\").    Weight as of this encounter: 133.4 kg (294 lb).         # Financial/Environmental Concerns:                  Diet: NPO for Medical/Clinical Reasons Except for: No Exceptions    DVT Prophylaxis: Pneumatic Compression Devices  Palmer Catheter: Not present  Lines: None     Cardiac Monitoring: ACTIVE order. Indication: Stroke, acute (48 hours)  Code Status: Full CodeFull         Disposition Plan      Expected Discharge Date: 09/07/2024             Entered: Cindy Scott PA-C 09/05/2024, 11:33 AM     The patient's care was discussed with the Attending Physician, Dr. Cunningham and Patient.    Cindy Scott PA-C  [facility]  Securely message with the Vocera Web Console (learn more here)    ______________________________________________________________________    Chief Complaint   Left facial droop, slurred speech    History is obtained from the patient and EMR.      History of Present Illness   Mike Schultz is a 73 year old male with PMH of HTN, HFpEF, gout, hx amiodarone pulmonary toxicity, aortic stenosis, bell's palsy (right side about 2 years ago), herpes simplex, HLD, atrial fibrillation, BOWEN, pulmonary nodules who presented to the ED on 9/5/24 for evaluation of stroke-like symptoms.    Patient's wife states he woke up around 0700 with slurred speech, facial droop. Last well known was around 10:00 pm on 9/4, although patient states he woke up at 0230 to eat cereal and he felt normal. Patient denies chest pain, shortness of breath, fevers, cough, headache, vision changes, new limb numbness or weakness. He has a history of right sided Bell's palsy. Patient took his Xarelto this morning around 0800.    In the ED, afebrile with HR 77 and /84, saturating 94%. CBC and BMP unremarkable. Troponin 32. INR 1.97. EKG SR with 1st degree AV block, " prolonged  ms. CT head NAD. CTA head and neck showed moderate left vertebral artery stenosis, mild-to-moderate stenoses of the cavernous and supraclinoid ICAs from atherosclerotic disease. CT perfusion imagines suggests possible bandlike area of ischemia/infarct in the posterior right frontal lobe region. This would likely be of a distal right M3/M4 vessel which is not well visualized by this CTA.     Patient states his breathing feels ok, but not quite his baseline. He has no chest pain. He notes some increased LE edema since his medications were recently adjusted in the last 2 weeks for ROBIN. He states he only uses O2 at HS blending into his CPAP, otherwise he is normally on room air.    Past Medical History    I have reviewed this patient's medical history and updated it with pertinent information if needed.   Past Medical History:   Diagnosis Date    (HFpEF) heart failure with preserved ejection fraction (H)     Acute gouty arthritis     Alcohol use disorder in remission     Amiodarone pulmonary toxicity     Aortic stenosis     Basal cell carcinoma     Of the nose    Bell's palsy     Herpes simplex without mention of complication     Hyperlipidemia     Hypertension     Persistent atrial fibrillation (H)     persistent, DCCV 2017, ablation 17    Pulmonary nodules     Sleep apnea     CPAP       Prior to Admission Medications   Prior to Admission Medications   Prescriptions Last Dose Informant Patient Reported? Taking?   allopurinol (ZYLOPRIM) 300 MG tablet 2024  No Yes   Sig: Take 1 tablet (300 mg) by mouth daily   bumetanide (BUMEX) 1 MG tablet 2024 at am  No Yes   Si mg am and 2 mg pm   Patient taking differently: Take 2 mg by mouth 2 times daily.   cetirizine (ZYRTEC) 10 MG tablet 2024  Yes Yes   Sig: Take 10 mg by mouth every evening   chlorthalidone (HYGROTON) 25 MG tablet   No No   Sig: Take 1 tablet (25 mg) by mouth daily HOLD   Patient not taking: Reported on 2024    finasteride (PROPECIA) 1 MG tablet 9/5/2024  No Yes   Sig: Take 1 tablet (1 mg) by mouth daily   losartan (COZAAR) 25 MG tablet 9/5/2024  No Yes   Sig: Take 1 tablet (25 mg) by mouth daily   Patient taking differently: Take 12.5 mg by mouth daily.   miconazole (MICATIN) 2 % external powder   No No   Sig: Apply topically 2 times daily   Patient not taking: Reported on 8/28/2024   potassium chloride pushpa ER (KLOR-CON M20) 20 MEQ CR tablet 9/5/2024 at am  No Yes   Sig: Take 2 tablets (40 mEq) by mouth 2 times daily   psyllium (METAMUCIL/KONSYL) capsule Not Taking  Yes No   Sig: Take 1-2 capsules by mouth daily   Patient not taking: Reported on 9/5/2024   rivaroxaban ANTICOAGULANT (XARELTO ANTICOAGULANT) 20 MG TABS tablet 9/5/2024  No Yes   Sig: Take 1 tablet (20 mg) by mouth daily (with dinner)   sotalol (BETAPACE) 80 MG tablet 9/5/2024 at am  No Yes   Sig: Take 1 tablet (80 mg) by mouth every 12 hours   spironolactone (ALDACTONE) 25 MG tablet   No No   Sig: Take 1 tablet (25 mg) by mouth daily   Patient not taking: Reported on 8/28/2024      Facility-Administered Medications: None     Allergies   Allergies   Allergen Reactions    Amiodarone Shortness Of Breath     Suspected amiodarone toxicity involving lungs       Physical Exam   Vital Signs: Temp: 98.2  F (36.8  C) Temp src: Temporal BP: 126/70 Pulse: 64   Resp: 14 SpO2: 94 % O2 Device: Nasal cannula Oxygen Delivery: 4 LPM  Weight: 294 lbs 0 oz    Constitutional: Awake, alert, cooperative, no apparent distress.   ENT: Normocephalic, without obvious abnormality, atraumatic. Normal sclera.    Neck: Supple, symmetrical, trachea midline  Pulmonary: No increased work of breathing, diminished  Cardiovascular: Regular rate and rhythm, systolic murmur  GI: Normal bowel sounds, soft, non-distended, non-tender.   Skin: Visualized skin appeared clear.  Neuro: Oriented x 3. Left facial droop with dysarthria. No left arm drift noted, although  feels slightly  weaker.  Psych:  Normal affect and mood  Extremities: Normal muscle tone. No gross deformities noted. Calves non-tender b/l. 2-3+ pitting edema b/l LE    Medical Decision Making       75 MINUTES SPENT BY ME on the date of service doing chart review, history, exam, documentation & further activities per the note.         Data   Data reviewed today: I reviewed all medications, new labs and imaging results over the last 24 hours. I personally reviewed no images or EKG's today.      I have personally reviewed the following data over the past 24 hrs:    9.2  \   14.3   / 255     140 102 16.3 /  104 (H)   3.6 27 1.04 \     Trop: 32 (H) BNP: N/A     INR:  1.97 (H) PTT:  37   D-dimer:  N/A Fibrinogen:  N/A       Imaging results reviewed over the past 24 hrs:   Recent Results (from the past 24 hour(s))   CT Head w/o Contrast    Narrative    CT SCAN OF THE HEAD WITHOUT CONTRAST September 5, 2024 8:50 AM     HISTORY: Code Stroke to evaluate for potential thrombolysis and  thrombectomy. Left-sided weakness.    TECHNIQUE: Axial images of the head and coronal reformations without  IV contrast material. Radiation dose for this scan was reduced using  automated exposure control, adjustment of the mA and/or kV according  to patient size, or iterative reconstruction technique.    COMPARISON: Brain MR 2/17/2023.    FINDINGS: There is no evidence of intracranial hemorrhage, mass, acute  infarct or anomaly. The ventricles are normal in size, shape and  configuration. The brain parenchyma and subarachnoid spaces are  normal.     The visualized portions of the sinuses and mastoids appear normal. The  bony calvarium and bones of the skull base appear intact.       Impression    IMPRESSION: No evidence of acute intracranial hemorrhage, mass, or  herniation.      KAILASH MARINA MD         SYSTEM ID:  [id number]   CTA Head Neck with Contrast    Narrative    CT ANGIOGRAM OF THE HEAD AND NECK WITH CONTRAST  CT HEAD PERFUSION WITH CONTRAST  September 5, 2024 8:53 AM     HISTORY: Code Stroke to evaluate for potential thrombolysis and  thrombectomy. Left-sided weakness.    TECHNIQUE: CT angiography with an injection of 67 mL Isovue 370 IV  with scans through the head and neck. Images were transferred to a  separate 3-D workstation where multiplanar reformations and 3-D images  were created. Estimates of carotid stenoses are made relative to the  distal internal carotid artery diameters except as noted. Radiation  dose for this scan was reduced using automated exposure control,  adjustment of the mA and/or kV according to patient size, or iterative  reconstruction technique.     Perfusion scans were performed with injection of additional IV  contrast. These images were processed on a separate 3-D workstation.     COMPARISON: None.     CT HEAD FINDINGS: CT perfusion images demonstrate bandlike area of  increased T. Max and time to drain in the posterior right frontal lobe  region concerning for possible acute ischemia/infarct. Question subtle  decrease in cerebral blood flow and cerebral blood volume in that  area.    CT ANGIOGRAM HEAD FINDINGS: Evaluation is difficult given mild motion  artifact and scanner technique. No definite vascular cut off of the  proximal ACAs, MCAs, or PCAs. Evaluation of the more distal  intracranial vessels is limited. Atherosclerotic disease along the  cavernous and supraclinoid ICAs cause mild to moderate stenoses.  Venous circulation is unremarkable.     CT ANGIOGRAM NECK FINDINGS: Scattered atherosclerotic calcification in  the aortic arch without significant stenosis at the origins of the  great vessels.     Right carotid artery: The right common and internal carotid arteries  are patent. Mild atherosclerotic disease at the carotid bifurcation  and proximal internal carotid artery without significant stenosis by  NASCET criteria.     Left carotid artery: The left common and internal carotid arteries are  patent. Mild  atherosclerotic disease at the carotid bifurcation and  proximal internal carotid artery without significant stenosis by  NASCET criteria.     Vertebral arteries: Vertebral arteries are patent without evidence of  dissection. Moderate stenosis of the origin of the left vertebral  artery.    Other findings: There are degenerative changes in the spine.       Impression    IMPRESSION:   1. Patent arteries in the neck without evidence of dissection. Mild  atherosclerotic disease in the carotid arteries bilaterally without  significant stenosis by NASCET criteria. Moderate stenosis at the  origin of the left vertebral artery.  2. Patent proximal major intracranial arteries without vascular  cutoff. Mild-to-moderate stenoses of the cavernous and supraclinoid  ICAs from atherosclerotic disease. No aneurysm identified.  3. CT perfusion images suggests possible bandlike area of  ischemia/infarct in the posterior right frontal lobe region. This  would likely be of a distal right M3/M4 vessel which is not well  evaluated by this CTA.    Results discussed with Dale Giordano at 9:05 AM on 9/5/2024.    KAILASH MARINA MD         SYSTEM ID:  [id number]   CT Head Perfusion w Contrast - For Tier 2 Stroke    Narrative    CT ANGIOGRAM OF THE HEAD AND NECK WITH CONTRAST  CT HEAD PERFUSION WITH CONTRAST September 5, 2024 8:53 AM     HISTORY: Code Stroke to evaluate for potential thrombolysis and  thrombectomy. Left-sided weakness.    TECHNIQUE: CT angiography with an injection of 67 mL Isovue 370 IV  with scans through the head and neck. Images were transferred to a  separate 3-D workstation where multiplanar reformations and 3-D images  were created. Estimates of carotid stenoses are made relative to the  distal internal carotid artery diameters except as noted. Radiation  dose for this scan was reduced using automated exposure control,  adjustment of the mA and/or kV according to patient size, or iterative  reconstruction technique.      Perfusion scans were performed with injection of additional IV  contrast. These images were processed on a separate 3-D workstation.     COMPARISON: None.     CT HEAD FINDINGS: CT perfusion images demonstrate bandlike area of  increased T. Max and time to drain in the posterior right frontal lobe  region concerning for possible acute ischemia/infarct. Question subtle  decrease in cerebral blood flow and cerebral blood volume in that  area.    CT ANGIOGRAM HEAD FINDINGS: Evaluation is difficult given mild motion  artifact and scanner technique. No definite vascular cut off of the  proximal ACAs, MCAs, or PCAs. Evaluation of the more distal  intracranial vessels is limited. Atherosclerotic disease along the  cavernous and supraclinoid ICAs cause mild to moderate stenoses.  Venous circulation is unremarkable.     CT ANGIOGRAM NECK FINDINGS: Scattered atherosclerotic calcification in  the aortic arch without significant stenosis at the origins of the  great vessels.     Right carotid artery: The right common and internal carotid arteries  are patent. Mild atherosclerotic disease at the carotid bifurcation  and proximal internal carotid artery without significant stenosis by  NASCET criteria.     Left carotid artery: The left common and internal carotid arteries are  patent. Mild atherosclerotic disease at the carotid bifurcation and  proximal internal carotid artery without significant stenosis by  NASCET criteria.     Vertebral arteries: Vertebral arteries are patent without evidence of  dissection. Moderate stenosis of the origin of the left vertebral  artery.    Other findings: There are degenerative changes in the spine.       Impression    IMPRESSION:   1. Patent arteries in the neck without evidence of dissection. Mild  atherosclerotic disease in the carotid arteries bilaterally without  significant stenosis by NASCET criteria. Moderate stenosis at the  origin of the left vertebral artery.  2. Patent proximal  major intracranial arteries without vascular  cutoff. Mild-to-moderate stenoses of the cavernous and supraclinoid  ICAs from atherosclerotic disease. No aneurysm identified.  3. CT perfusion images suggests possible bandlike area of  ischemia/infarct in the posterior right frontal lobe region. This  would likely be of a distal right M3/M4 vessel which is not well  evaluated by this CTA.    Results discussed with Dale Giordano at 9:05 AM on 9/5/2024.    KAILASH MARINA MD         SYSTEM ID:  [id number]   MR Brain w/o & w Contrast    Narrative    MRI BRAIN WITHOUT AND WITH CONTRAST  9/5/2024 10:54 AM     HISTORY:  Left facial droop, left upper extremity weakness, slurred  speech.     TECHNIQUE:  Multiplanar, multisequence MRI of the brain without and  with 13 mL Gadavist.    COMPARISON: Head CT from earlier the same day. Brain MR 2/17/2023.     FINDINGS: Bandlike area of restricted diffusion in the posterior right  frontal lobe with mild associated T2 hyperintensity concerning for  acute/subacute infarct. This corresponds with the abnormality seen on  prior CT perfusion images. No corresponding susceptibility  hypointensity to suggest hemorrhagic transformation of infarct. No  other territory of restricted diffusion. No mass effect or midline  shift. Ventricular size is within normal limits without evidence of  hydrocephalus. Mild patchy periventricular white matter T2  hyperintensities which are nonspecific, but most likely related to  chronic microvascular ischemic disease. No suspicious intracranial  enhancement.     The facial structures appear normal.       Impression    IMPRESSION:    1. Acute/subacute appearing infarct in the posterior right frontal  lobe. No evidence of hemorrhagic transformation.   2. Mild nonspecific white matter changes which are likely due to  chronic microvascular ischemic disease.     KAILASH MARINA MD         SYSTEM ID:  [id number]

## 2024-09-05 NOTE — PROGRESS NOTES
RECEIVING UNIT ED HANDOFF REVIEW    ED Nurse Handoff Report was reviewed by: Louisa Veliz RN on September 5, 2024 at 10:57 AM

## 2024-09-05 NOTE — CONSULTS
"    [facility]     Stroke Code Note          History of Present Illness     Chief Complaint: Stroke Symptoms    Mike Schultz is a 73 year old male with a PMH significant for hyperlipidemia, atrial fibrillation (on Xarelto), aortic stenosis, alcohol use disorder (in remission), sleep apnea, hypertension, history of Bell's palsy (right side, approximately 2 years ago), CHF.  Brayden presents today with new neurologic symptoms. Brayden reports his LKW was at 2:45 AM this morning after waking up to eat a bowl of cereal he went back to bed at that time.  He then woke up again at 7 AM with new symptoms of slurred speech, facial droop, and left arm weakness.  His wife Katyt woke up at 8 AM and noted the symptoms as well.  In the ED, CT head was negative for hemorrhage.  CTA was negative for proximal LVO.  CT perfusion suggests possible ischemia in the posterior right frontal lobe which would correspond to a distal right M3/M4 vessel.  Brayden reports taking his Xarelto this morning and denies any recent skipped doses.  Presenting blood pressure was 149/84.         Past Medical History     Stroke risk factors: HTN, HLD, atrial fibrillation, sleep apnea    Preadmission antithrombotic regimen: Xarelto    Modified Carson Score (Pre-morbid)  0 - No symptoms.                 Assessment and Plan       Left facial droop, dysarthria, left upper extremity weakness and decreased dexterity of left hand, concerning for right hemispheric infarct.     Intravenous Thrombolysis  Not given due to:   - DOAC dose within 48 hours or INR > 1.7  - unclear or unfavorable risk-benefit profile for extended window thrombolysis beyond the conventional 4.5 hour time window     Endovascular Treatment  Not initiated due to absence of proximal vessel occlusion     Plan:  - Use orderset: \"Ischemic Stroke Routine Admission\" or \"Ischemic Stroke No Thrombolytics/No Thrombectomy ICU Admission\"  - Place Neurology IP Stroke Consult order " "  - Neurochecks and Vital Signs every 4 hours   - Permissive HTN; avoid SBP > 180 mmHg since he is fully anticoagulated  - Continue prior to arrival Xarelto (already received daily dose this morning)  - Statin: Will initiate high intensity statin pending LDL  - MRI Brain with and without contrast (ordered)  - TTE (with Bubble Study if age 60 yrs or less)  - Telemetry, EKG  - Bedside Glucose Monitoring  - Nutrition: Given facial droop and dysarthria will need SLP evaluation before receiving oral medications    - A1c, Lipid Panel, Troponin x 3  - PT/OT/SLP  - Stroke Education  - Euthermia, Euglycemia     Patient case discussed and patient seen with vascular neurology attending Dr. Victor Manuel Alfred PA-C  Vascular Neurology    To page me or covering stroke neurology team member, click here: AMCOM  Choose \"On Call\" tab at top, then select \"NEUROLOGY/ALL SITES\" from middle drop-down box, press Enter, then look for \"stroke\" or \"telestroke\" for your site.  ___________________________________________________________________      Imaging/Labs   (personally reviewed CT, CTA head and neck, CT perfusion)    CT head: No evidence of hemorrhage  CTA head/neck: No proximal large vessel occlusion.  Mild atherosclerotic disease in bilateral carotid arteries.  Moderate stenosis of the origin of the left vertebral artery.  Mild to moderate stenosis of the cavernous and supraclinoid ICAs.  CT Perfusion head: CT perfusion images suggests possible bandlike area of ischemia/infarct in the posterior right frontal lobe region. This would likely be of a distal right M3/M4 vessel which is not well evaluated by this CTA.         Physical Examination     BP: 126/70   Pulse: 64   Resp: 14   Temp: 98.2  F (36.8  C)   Temp src: Temporal   SpO2: 94 %   O2 Device: Nasal cannula   Weight: 133.4 kg (294 lb)    Wt Readings from Last 2 Encounters:   09/05/24 133.4 kg (294 lb)   08/28/24 130.5 kg (287 lb 12.8 oz)       General Exam  General: "  patient lying in bed without any acute distress    HEENT:  normocephalic/atraumatic  Pulmonary:  no respiratory distress    Neuro Exam  Mental Status: Alert, not oriented to age, oriented to month follows; follows commands, mild to moderate dysarthria; naming normal, able to repeat words correctly but dysarthric   Cranial Nerves: visual fields intact, EOMI with normal smooth pursuit, facial sensation intact and symmetric, left facial weakness, subtle weakness with left eye closing, hearing not formally tested but intact to conversation, tongue protrusion midline  Motor: No abnormal movements, subtle left upper extremity drift, no right upper extremity drift, no lower extremity drift   Reflexes: Deferred  Sensory:  light touch sensation intact and symmetric throughout upper and lower extremities, no extinction on double simultaneous stimulation   Coordination: Ataxia not out of proportion to weakness in the left upper extremity on finger-to-nose testing, decreased dexterity of left hand in comparison to right seen on finger taps testing, normal right finger-to-nose, normal bilateral lower extremity heel-to-shin testing without dysmetria, right finger taps fast   Station/Gait: Deferred      Stroke Scales     NIHSS  1a. Level of Consciousness 0-->Alert, keenly responsive   1b. LOC Questions 1-->Answers one question correctly   1c. LOC Commands 0-->Performs both tasks correctly   2.   Best Gaze 0-->Normal   3.   Visual 0-->No visual loss   4.   Facial Palsy 2-->Partial paralysis (total or near-total paralysis of lower face)   5a. Motor Arm, Left 1-->Drift, limb holds 90 (or 45) degrees, but drifts down before full 10 seconds, does not hit bed or other support   5b. Motor Arm, Right 0-->No drift, limb holds 90 (or 45) degrees for full 10 secs   6a. Motor Leg, Left 0-->No drift, leg holds 30 degree position for full 5 secs   6b. Motor Leg, right 0-->No drift, leg holds 30 degree position for full 5 secs   7.   Limb  "Ataxia 1-->Present in one limb   8.   Sensory 0-->Normal, no sensory loss   9.   Best Language 0-->No aphasia, normal   10. Dysarthria 1-->Mild-to-moderate dysarthria, patient slurs at least some words and, at worst, can be understood with some difficulty   11. Extinction and Inattention  0-->No abnormality   Total 6 (09/05/24 0917)          Labs     CBC  Lab Results   Component Value Date    HGB 14.3 09/05/2024    HCT 43.7 09/05/2024    WBC 9.2 09/05/2024     09/05/2024       BMP  Lab Results   Component Value Date     09/05/2024    POTASSIUM 3.6 09/05/2024    CHLORIDE 102 09/05/2024    CO2 27 09/05/2024    BUN 16.3 09/05/2024    CR 1.04 09/05/2024     (H) 09/05/2024    ASHELY 9.1 09/05/2024       INR  INR   Date Value Ref Range Status   09/05/2024 1.97 (H) 0.85 - 1.15 Final   08/01/2024 2.47 (H) 0.85 - 1.15 Final   11/26/2023 2.10 (H) 0.85 - 1.15 Final       Data   Stroke Code Data  (for stroke code without tele)  Stroke code activated 09/05/24  0837   First stroke provider response 09/05/24  0839   Last known normal 09/05/24  0245   Time of discovery (or onset of symptoms) 09/05/24  0700   Head CT read by Stroke Neuro Provider 09/05/24  0850   Was stroke code de-escalated? Yes  09/05/24  0901        Clinically Significant Risk Factors Present on Admission               # Drug Induced Coagulation Defect: home medication list includes an anticoagulant medication    # Hypertension: Noted on problem list  # Chronic heart failure with preserved ejection fraction: heart failure noted on problem list and last echo with EF >50%        # Severe Obesity: Estimated body mass index is 44.7 kg/m  as calculated from the following:    Height as of this encounter: 1.727 m (5' 8\").    Weight as of this encounter: 133.4 kg (294 lb).         # Financial/Environmental Concerns:             Time Spent on this Encounter   Billing: I personally examined and evaluated the patient today. At the time of my evaluation and " management the patient was critical condition today due to stroke like symptoms. I personally managed stroke code. Key decisions made today included examination, review of imaging, need for further imaging and stroke admission. I spent a total of 60 minutes providing critical care services, evaluating the patient, directing care and reviewing laboratory values and radiologic reports.

## 2024-09-05 NOTE — PLAN OF CARE
September 5, 2024  Shift:[id number]  Mike Schultz  73 year old  YOB: 1950    Reason for admission:Facial droop [R29.810]  Dysarthria [R47.1]  Acute CVA (cerebrovascular accident) (H) [I63.9]    Cognition/Mentation:A&Ox 4  Neuros/CMS:WDL ex left facial droop, deviated tongue left, left sided weakness 4/5  VS:VSS on RA  Cardiac:Tele NS  GI:WDL  :WDL  Pulmonary:SOB  Pain:denies pain  Drains/Lines:PIV SL  Skin:Sam   Activity:SBA  Diet:Cardiac Soft chew, thin liquids, no straws, 2000 ml fluid restriction  Discharge:Pending

## 2024-09-05 NOTE — ED TRIAGE NOTES
Went to bed at midnight. Woke up at 230AM and had cereal and was normal. Woke up at 7AM this morning and noted to have: facial droop, left sided weakness and slurred speech. Takes Xarelto for A-fib.

## 2024-09-05 NOTE — ED NOTES
[facility]    ED Nurse Handoff Report    ED Chief complaint: Stroke Symptoms      ED Diagnosis:   Final diagnoses:   Acute CVA (cerebrovascular accident) (H)   Facial droop - Left   Dysarthria       Code Status: Full Code    Allergies:   Allergies   Allergen Reactions    Amiodarone Shortness Of Breath     Suspected amiodarone toxicity involving lungs       Patient Story:  Pt arrives to ER through triage with wife with complaints of Left sided weakness, left sided facial droop and slurred speech since waking up at 0700. Last known well 0230. Tier 2 code stroke called. CT and MRI completed.     Focused Assessment:    Pt is A&Ox4, normally independent. Left sided facial droop, left sided arm and leg weakness and slurred speech. No aphasia noted. CT showed CVA. Pt given oral ativan for MRI and dysphagia passed.     Labs Ordered and Resulted from Time of ED Arrival to Time of ED Departure   BASIC METABOLIC PANEL - Abnormal       Result Value    Sodium 140      Potassium 3.6      Chloride 102      Carbon Dioxide (CO2) 27      Anion Gap 11      Urea Nitrogen 16.3      Creatinine 1.04      GFR Estimate 76      Calcium 9.1      Glucose 104 (*)    INR - Abnormal    INR 1.97 (*)    TROPONIN T, HIGH SENSITIVITY - Abnormal    Troponin T, High Sensitivity 32 (*)    CBC WITH PLATELETS AND DIFFERENTIAL - Abnormal    WBC Count 9.2      RBC Count 4.37 (*)     Hemoglobin 14.3      Hematocrit 43.7            MCH 32.7      MCHC 32.7      RDW 15.2 (*)     Platelet Count 255      % Neutrophils 63      % Lymphocytes 21      % Monocytes 12      % Eosinophils 3      % Basophils 1      % Immature Granulocytes 0      NRBCs per 100 WBC 0      Absolute Neutrophils 5.8      Absolute Lymphocytes 2.0      Absolute Monocytes 1.1      Absolute Eosinophils 0.3      Absolute Basophils 0.1      Absolute Immature Granulocytes 0.0      Absolute NRBCs 0.0     PARTIAL THROMBOPLASTIN TIME - Normal    aPTT 37     GLUCOSE MONITOR  NURSING POCT   MAGNESIUM       CT Head Perfusion w Contrast - For Tier 2 Stroke   Preliminary Result   IMPRESSION:    1. Patent arteries in the neck without evidence of dissection. Mild   atherosclerotic disease in the carotid arteries bilaterally without   significant stenosis by NASCET criteria. Moderate stenosis at the   origin of the left vertebral artery.   2. Patent proximal major intracranial arteries without vascular   cutoff. Mild-to-moderate stenoses of the cavernous and supraclinoid   ICAs from atherosclerotic disease. No aneurysm identified.   3. CT perfusion images suggests possible bandlike area of   ischemia/infarct in the posterior right frontal lobe region. This   would likely be of a distal right M3/M4 vessel which is not well   evaluated by this CTA.      Results discussed with Dale Giordano at 9:05 AM on 9/5/2024.      CTA Head Neck with Contrast   Preliminary Result   IMPRESSION:    1. Patent arteries in the neck without evidence of dissection. Mild   atherosclerotic disease in the carotid arteries bilaterally without   significant stenosis by NASCET criteria. Moderate stenosis at the   origin of the left vertebral artery.   2. Patent proximal major intracranial arteries without vascular   cutoff. Mild-to-moderate stenoses of the cavernous and supraclinoid   ICAs from atherosclerotic disease. No aneurysm identified.   3. CT perfusion images suggests possible bandlike area of   ischemia/infarct in the posterior right frontal lobe region. This   would likely be of a distal right M3/M4 vessel which is not well   evaluated by this CTA.      Results discussed with Dale Giordano at 9:05 AM on 9/5/2024.      CT Head w/o Contrast   Final Result   IMPRESSION: No evidence of acute intracranial hemorrhage, mass, or   herniation.         KAILASH MARINA MD            SYSTEM ID:  [id number]      MR Brain w/o & w Contrast    (Results Pending)         Treatments and/or interventions provided:      Medications    iopamidol (ISOVUE-370) solution 117 mL (117 mLs Intravenous $Given 9/5/24 0889)     And   sodium chloride 0.9 % bag for CT scan flush use (100 mLs As instructed $Given 9/5/24 0846)   LORazepam (ATIVAN) tablet 1 mg (1 mg Oral $Given 9/5/24 1025)   gadobutrol (GADAVIST) injection 13 mL (13 mLs Intravenous $Given 9/5/24 1046)       Patient's response to treatments and/or interventions:    Pt tolerated MRI well. Pt can move independently.     To be done/followed up on inpatient unit:   See any in-patient orders    Does this patient have any cognitive concerns?: none    Activity level - Baseline/Home:    Independent    Activity Level - Current:    Stand with Assist    Patient's Preferred language: English     Needed?: No    Isolation: None  Infection: Not Applicable  Patient tested for COVID 19 prior to admission: NO    Bariatric?: No    Vital Signs:   Vitals:    09/05/24 0915 09/05/24 0945 09/05/24 0957 09/05/24 1003   BP:   126/70    Pulse: 68 65 65 64   Resp: 19 17 19 14   Temp:       TempSrc:       SpO2: 95% 94%     Weight:       Height:           Cardiac Rhythm:     Was the PSS-3 completed:   Yes  What interventions are required if any?               Family Comments: Wife at bedside  OBS brochure/video discussed/provided to patient/family: N/A              Name of person given brochure if not patient:               Relationship to patient:     For the majority of the shift this patient's behavior was Green.  Behavioral interventions performed were .    ED NURSE PHONE NUMBER: *[phone]

## 2024-09-06 ENCOUNTER — APPOINTMENT (OUTPATIENT)
Dept: PHYSICAL THERAPY | Facility: CLINIC | Age: 74
DRG: 064 | End: 2024-09-06
Attending: PHYSICIAN ASSISTANT
Payer: COMMERCIAL

## 2024-09-06 ENCOUNTER — APPOINTMENT (OUTPATIENT)
Dept: SPEECH THERAPY | Facility: CLINIC | Age: 74
DRG: 064 | End: 2024-09-06
Payer: COMMERCIAL

## 2024-09-06 ENCOUNTER — APPOINTMENT (OUTPATIENT)
Dept: CARDIOLOGY | Facility: CLINIC | Age: 74
DRG: 064 | End: 2024-09-06
Attending: PHYSICIAN ASSISTANT
Payer: COMMERCIAL

## 2024-09-06 ENCOUNTER — APPOINTMENT (OUTPATIENT)
Dept: OCCUPATIONAL THERAPY | Facility: CLINIC | Age: 74
DRG: 064 | End: 2024-09-06
Attending: PHYSICIAN ASSISTANT
Payer: COMMERCIAL

## 2024-09-06 ENCOUNTER — APPOINTMENT (OUTPATIENT)
Dept: CT IMAGING | Facility: CLINIC | Age: 74
DRG: 064 | End: 2024-09-06
Attending: PSYCHIATRY & NEUROLOGY
Payer: COMMERCIAL

## 2024-09-06 ENCOUNTER — TELEPHONE (OUTPATIENT)
Dept: CARDIOLOGY | Facility: CLINIC | Age: 74
End: 2024-09-06
Payer: COMMERCIAL

## 2024-09-06 VITALS
TEMPERATURE: 98.3 F | WEIGHT: 293.5 LBS | HEART RATE: 86 BPM | DIASTOLIC BLOOD PRESSURE: 73 MMHG | SYSTOLIC BLOOD PRESSURE: 130 MMHG | HEIGHT: 68 IN | OXYGEN SATURATION: 94 % | RESPIRATION RATE: 19 BRPM | BODY MASS INDEX: 44.48 KG/M2

## 2024-09-06 LAB
ANION GAP SERPL CALCULATED.3IONS-SCNC: 11 MMOL/L (ref 7–15)
BUN SERPL-MCNC: 17.5 MG/DL (ref 8–23)
CALCIUM SERPL-MCNC: 9.4 MG/DL (ref 8.8–10.4)
CHLORIDE SERPL-SCNC: 103 MMOL/L (ref 98–107)
CREAT SERPL-MCNC: 1.01 MG/DL (ref 0.67–1.17)
EGFRCR SERPLBLD CKD-EPI 2021: 79 ML/MIN/1.73M2
ERYTHROCYTE [DISTWIDTH] IN BLOOD BY AUTOMATED COUNT: 15.3 % (ref 10–15)
GLUCOSE BLDC GLUCOMTR-MCNC: 106 MG/DL (ref 70–99)
GLUCOSE BLDC GLUCOMTR-MCNC: 123 MG/DL (ref 70–99)
GLUCOSE SERPL-MCNC: 148 MG/DL (ref 70–99)
HCO3 SERPL-SCNC: 27 MMOL/L (ref 22–29)
HCT VFR BLD AUTO: 43 % (ref 40–53)
HGB BLD-MCNC: 13.9 G/DL (ref 13.3–17.7)
LVEF ECHO: NORMAL
MAGNESIUM SERPL-MCNC: 1.8 MG/DL (ref 1.7–2.3)
MCH RBC QN AUTO: 32.3 PG (ref 26.5–33)
MCHC RBC AUTO-ENTMCNC: 32.3 G/DL (ref 31.5–36.5)
MCV RBC AUTO: 100 FL (ref 78–100)
PLATELET # BLD AUTO: 250 10E3/UL (ref 150–450)
POTASSIUM SERPL-SCNC: 3.6 MMOL/L (ref 3.4–5.3)
RBC # BLD AUTO: 4.31 10E6/UL (ref 4.4–5.9)
SODIUM SERPL-SCNC: 141 MMOL/L (ref 135–145)
WBC # BLD AUTO: 7.9 10E3/UL (ref 4–11)

## 2024-09-06 PROCEDURE — 99239 HOSP IP/OBS DSCHRG MGMT >30: CPT | Performed by: HOSPITALIST

## 2024-09-06 PROCEDURE — 93306 TTE W/DOPPLER COMPLETE: CPT | Mod: 26 | Performed by: INTERNAL MEDICINE

## 2024-09-06 PROCEDURE — 97165 OT EVAL LOW COMPLEX 30 MIN: CPT | Mod: GO

## 2024-09-06 PROCEDURE — 83735 ASSAY OF MAGNESIUM: CPT | Performed by: HOSPITALIST

## 2024-09-06 PROCEDURE — 250N000013 HC RX MED GY IP 250 OP 250 PS 637: Performed by: PHYSICIAN ASSISTANT

## 2024-09-06 PROCEDURE — C8929 TTE W OR WO FOL WCON,DOPPLER: HCPCS

## 2024-09-06 PROCEDURE — 71260 CT THORAX DX C+: CPT | Mod: MG

## 2024-09-06 PROCEDURE — 250N000011 HC RX IP 250 OP 636: Performed by: PSYCHIATRY & NEUROLOGY

## 2024-09-06 PROCEDURE — 80048 BASIC METABOLIC PNL TOTAL CA: CPT | Performed by: PHYSICIAN ASSISTANT

## 2024-09-06 PROCEDURE — 97161 PT EVAL LOW COMPLEX 20 MIN: CPT | Mod: GP

## 2024-09-06 PROCEDURE — 250N000013 HC RX MED GY IP 250 OP 250 PS 637: Performed by: PSYCHIATRY & NEUROLOGY

## 2024-09-06 PROCEDURE — 99233 SBSQ HOSP IP/OBS HIGH 50: CPT | Mod: 25

## 2024-09-06 PROCEDURE — 92526 ORAL FUNCTION THERAPY: CPT | Mod: GN | Performed by: SPEECH-LANGUAGE PATHOLOGIST

## 2024-09-06 PROCEDURE — 85027 COMPLETE CBC AUTOMATED: CPT | Performed by: PHYSICIAN ASSISTANT

## 2024-09-06 PROCEDURE — 250N000009 HC RX 250: Performed by: PSYCHIATRY & NEUROLOGY

## 2024-09-06 PROCEDURE — 97535 SELF CARE MNGMENT TRAINING: CPT | Mod: GO

## 2024-09-06 PROCEDURE — 255N000002 HC RX 255 OP 636: Performed by: PHYSICIAN ASSISTANT

## 2024-09-06 PROCEDURE — 97116 GAIT TRAINING THERAPY: CPT | Mod: GP

## 2024-09-06 PROCEDURE — 36415 COLL VENOUS BLD VENIPUNCTURE: CPT | Performed by: PHYSICIAN ASSISTANT

## 2024-09-06 RX ORDER — ATORVASTATIN CALCIUM 40 MG/1
40 TABLET, FILM COATED ORAL EVERY EVENING
Status: DISCONTINUED | OUTPATIENT
Start: 2024-09-06 | End: 2024-09-06 | Stop reason: HOSPADM

## 2024-09-06 RX ORDER — ATORVASTATIN CALCIUM 40 MG/1
40 TABLET, FILM COATED ORAL EVERY EVENING
Qty: 60 TABLET | Refills: 3 | Status: SHIPPED | OUTPATIENT
Start: 2024-09-06

## 2024-09-06 RX ORDER — LOSARTAN POTASSIUM 25 MG/1
12.5 TABLET ORAL DAILY
COMMUNITY
Start: 2024-09-06 | End: 2024-09-18

## 2024-09-06 RX ORDER — IOPAMIDOL 755 MG/ML
135 INJECTION, SOLUTION INTRAVASCULAR ONCE
Status: COMPLETED | OUTPATIENT
Start: 2024-09-06 | End: 2024-09-06

## 2024-09-06 RX ADMIN — HUMAN ALBUMIN MICROSPHERES AND PERFLUTREN 3 ML: 10; .22 INJECTION, SOLUTION INTRAVENOUS at 11:13

## 2024-09-06 RX ADMIN — ALLOPURINOL 300 MG: 300 TABLET ORAL at 09:45

## 2024-09-06 RX ADMIN — BUMETANIDE 2 MG: 1 TABLET ORAL at 09:45

## 2024-09-06 RX ADMIN — BUMETANIDE 2 MG: 1 TABLET ORAL at 18:36

## 2024-09-06 RX ADMIN — SODIUM CHLORIDE 80 ML: 9 INJECTION, SOLUTION INTRAVENOUS at 17:16

## 2024-09-06 RX ADMIN — POTASSIUM CHLORIDE 40 MEQ: 1500 TABLET, EXTENDED RELEASE ORAL at 09:45

## 2024-09-06 RX ADMIN — ATORVASTATIN CALCIUM 40 MG: 40 TABLET, FILM COATED ORAL at 20:45

## 2024-09-06 RX ADMIN — POTASSIUM CHLORIDE 40 MEQ: 1500 TABLET, EXTENDED RELEASE ORAL at 20:45

## 2024-09-06 RX ADMIN — IOPAMIDOL 135 ML: 755 INJECTION, SOLUTION INTRAVENOUS at 17:13

## 2024-09-06 RX ADMIN — RIVAROXABAN 20 MG: 20 TABLET, FILM COATED ORAL at 16:54

## 2024-09-06 RX ADMIN — FINASTERIDE 1 MG: 1 TABLET, FILM COATED ORAL at 09:45

## 2024-09-06 RX ADMIN — CETIRIZINE HYDROCHLORIDE 10 MG: 10 TABLET, FILM COATED ORAL at 20:45

## 2024-09-06 ASSESSMENT — ACTIVITIES OF DAILY LIVING (ADL)
ADLS_ACUITY_SCORE: 39
PREVIOUS_RESPONSIBILITIES: MEAL PREP;HOUSEKEEPING;LAUNDRY;MEDICATION MANAGEMENT;FINANCES;DRIVING
ADLS_ACUITY_SCORE: 39
IADL_COMMENTS: PT REPORTS BEING IND W/ ALL IADL'S AT BASELINE PRIOR TO ADMISSION. PT REPORTS THAT THEY STILL DRIVE.
ADLS_ACUITY_SCORE: 39

## 2024-09-06 NOTE — PROGRESS NOTES
[facility]    Vascular Neurology Progress Note    Interval History     Mike was seen and examined this AM, denies any acute events overnight, continues to have dysarthria and L sided deficits.     HPI     Chief complaint: Stroke Symptoms    Mike Schultz is a 73 year old male with a PMH significant for hyperlipidemia, atrial fibrillation (on Xarelto), aortic stenosis, alcohol use disorder (in remission), sleep apnea, hypertension, history of Bell's palsy (right side, approximately 2 years ago), CHF.  Brayden presents today with new neurologic symptoms. Brayden reports his LKW was at 2:45 AM this morning after waking up to eat a bowl of cereal he went back to bed at that time.  He then woke up again at 7 AM with new symptoms of slurred speech, facial droop, and left arm weakness.  His wife Katty woke up at 8 AM and noted the symptoms as well.  In the ED, CT head was negative for hemorrhage.  CTA was negative for proximal LVO.  CT perfusion suggests possible ischemia in the posterior right frontal lobe which would correspond to a distal right M3/M4 vessel.  Brayden reports taking his Xarelto this morning and denies any recent skipped doses.  Presenting blood pressure was 149/84.     Assessment and Plan     1. Acute ischemic stroke of R frontal region due to cardioembolism        Plan  - Neurochecks and Vital Signs every 4 hours   - Long term BP goal <130/80 mmhg   - Continue prior to arrival Xarelto   - Statin: Atorvastatin 40 mg qd  - TTE (with Bubble Study if age 60 yrs or less)  - CT CAP w/ contrast for any occult malignancy   - CTA heart as in patient, if unable to get it today, can get a RAMIRO as outpatient instead for any cardio embolic source.   - Telemetry, EKG  - Bedside Glucose Monitoring  - PT/OT/SLP  - Stroke Education  - Euthermia, Euglycemia           DVT Prophylaxis: On Xarelto     Patient Follow-up    - in 6-8 weeks with general neurology ([phone])    We will continue  "to follow.       The Stroke Staff is Dr. Rush.    Jose R Verma MD  Vascular Neurology Fellow    To page me or covering stroke neurology team member, click here: AMCOM  Choose \"On Call\" tab at top, then select \"NEUROLOGY/ALL SITES\" from middle drop-down box, press Enter, then look for \"stroke\" or \"telestroke\" for your site.    Physical Examination     Temp: 97.6  F (36.4  C) Temp src: Oral BP: 134/87 Pulse: 65   Resp: 18 SpO2: 95 % O2 Device: Nasal cannula Oxygen Delivery: 2 LPM    Neurologic  Mental Status:  alert, oriented x 3, follows commands, speech dysarthric, naming and repetition normal  Cranial Nerves:  visual fields intact, PERRL, EOMI with normal smooth pursuit, L facial droop, hearing not formally tested but intact to conversation, palate elevation symmetric and uvula midline, shoulder shrug strong bilaterally, tongue protrusion midline  Motor:  normal muscle tone and bulk, no abnormal movements, able to move all limbs spontaneously against gravity, LUE 4/5   Reflexes:  toes down-going  Sensory:  light touch sensation intact and symmetric throughout upper and lower extremities, no extinction on double simultaneous stimulation   Coordination:  normal finger-to-nose and heel-to-shin bilaterally without dysmetria, rapid alternating movements symmetric  Station/Gait:  deferred    Stroke Scales       NIHSS  1a. Level of Consciousness 0-->Alert, keenly responsive   1b. LOC Questions 0-->Answers both questions correctly   1c. LOC Commands 0-->Performs both tasks correctly   2.   Best Gaze 0-->Normal   3.   Visual 0-->No visual loss   4.   Facial Palsy 2-->Partial paralysis (total or near-total paralysis of lower face)   5a. Motor Arm, Left 1-->Drift, limb holds 90 (or 45) degrees, but drifts down before full 10 seconds, does not hit bed or other support   5b. Motor Arm, Right 0-->No drift, limb holds 90 (or 45) degrees for full 10 secs   6a. Motor Leg, Left 0-->No drift, leg holds 30 degree " position for full 5 secs   6b. Motor Leg, right 0-->No drift, leg holds 30 degree position for full 5 secs   7.   Limb Ataxia 0-->Absent   8.   Sensory 0-->Normal, no sensory loss   9.   Best Language 0-->No aphasia, normal   10. Dysarthria 1-->Mild-to-moderate dysarthria, patient slurs at least some words and, at worst, can be understood with some difficulty   11. Extinction and Inattention  0-->No abnormality   Total 4 (09/06/24 5754)       Imaging/Labs   (Bolded imaging and labs new and/or personally reviewed or re-reviewed by me today)    MRI/Head CT Acute/subacute appearing infarct in the posterior right frontal  lobe. No evidence of hemorrhagic transformation   Intracranial Vasculature Mild-to-moderate stenoses of the cavernous and supraclinoid  ICAs from atherosclerotic disease, . Moderate stenosis at the  origin of the left vertebral artery.   Cervical Vasculature No LVO   CT perfusion images suggests possible bandlike area of  ischemia/infarct in the posterior right frontal lobe region. This  would likely be of a distal right M3/M4 vessel which is not well  evaluated by this CTA.  Echocardiogram Pending   EKG/Telemetry NSR w/ 1st degree AV block     LDL  9/5/2024: 115 mg/dL   A1C  9/5/2024: 6.5 %   Troponin 9/5/2024: 32 ng/L     Other labs reviewed by me today:

## 2024-09-06 NOTE — PROGRESS NOTES
[facility]    Hospitalist Progress Note    Interval History   Patient is awake and alert.  Very anxious to go home.  Did not sleep well last night.  Continues to have left facial droop.  Denies any significant chest discomfort.  Does have dyspnea on exertion.    -Data reviewed today: I reviewed all new labs and imaging results over the last 24 hours. I personally reviewed the brain MRI image(s) showing acute/subacute appearing infarct in the posterior right frontal lobe.  No specific evidence of hemorrhagic transformation. .    Physical Exam   Temp: 98.1  F (36.7  C) Temp src: Oral BP: 121/87 Pulse: 74   Resp: 18 SpO2: 92 % O2 Device: None (Room air) Oxygen Delivery: 2 LPM  Vitals:    09/05/24 0831 09/05/24 0840   Weight: 133.4 kg (294 lb) 133.4 kg (294 lb)     Vital Signs with Ranges  Temp:  [97.6  F (36.4  C)-98.1  F (36.7  C)] 98.1  F (36.7  C)  Pulse:  [56-74] 74  Resp:  [14-20] 18  BP: (107-145)/(64-87) 121/87  SpO2:  [90 %-98 %] 92 %  I/O last 3 completed shifts:  In: 780 [P.O.:780]  Out: 650 [Urine:650]    Physical Exam  Constitutional:       Appearance: Normal appearance. He is obese.   Cardiovascular:      Rate and Rhythm: Normal rate. Rhythm irregular.      Pulses: Normal pulses.      Heart sounds: Normal heart sounds.   Pulmonary:      Effort: No respiratory distress.      Breath sounds: Normal breath sounds.      Comments: Dyspnea.  Bilateral basal crackles present.  Abdominal:      General: Bowel sounds are normal. There is no distension.      Tenderness: There is no abdominal tenderness. There is no guarding.   Skin:     General: Skin is warm and dry.   Neurological:      General: No focal deficit present.      Comments: Left facial droop.  4 x 5 strength in the left upper extremity.           Medications   Current Facility-Administered Medications   Medication Dose Route Frequency Provider Last Rate Last Admin     Current Facility-Administered Medications   Medication Dose  Route Frequency Provider Last Rate Last Admin    allopurinol (ZYLOPRIM) tablet 300 mg  300 mg Oral Daily Cindy Scott PA-C   300 mg at 09/06/24 0945    atorvastatin (LIPITOR) tablet 40 mg  40 mg Oral QPM Jose R Verma MD        bumetanide (BUMEX) tablet 2 mg  2 mg Oral BID Cindy Scott PA-C   2 mg at 09/06/24 0945    cetirizine (zyrTEC) tablet 10 mg  10 mg Oral QPM Cindy Scott PA-C   10 mg at 09/05/24 2011    [Held by provider] chlorthalidone (HYGROTON) tablet 25 mg  25 mg Oral Daily Cindy Scott PA-C        finasteride (PROPECIA) tablet 1 mg  1 mg Oral Daily Cindy Scott PA-C   1 mg at 09/06/24 0945    [Held by provider] losartan (COZAAR) half-tab 12.5 mg  12.5 mg Oral Daily Cindy Scott PA-C        potassium chloride pushpa ER (KLOR-CON M20) CR tablet 40 mEq  40 mEq Oral BID Cindy Scott PA-C   40 mEq at 09/06/24 0945    rivaroxaban ANTICOAGULANT (XARELTO) tablet 20 mg  20 mg Oral Daily with supper Cindy Scott PA-C        sodium chloride (PF) 0.9% PF flush 3 mL  3 mL Intracatheter Q8H Cindy Scott PA-C   3 mL at 09/05/24 2016    [Held by provider] sotalol (BETAPACE) tablet 80 mg  80 mg Oral Q12H Cindy Scott PA-C        [Held by provider] spironolactone (ALDACTONE) tablet 25 mg  25 mg Oral Daily Cindy Scott PA-C           Data   Recent Labs   Lab 09/06/24  1008 09/06/24  0814 09/06/24  0228 09/05/24  1717 09/05/24  0840 09/04/24  1021   WBC 7.9  --   --   --  9.2  --    HGB 13.9  --   --   --  14.3  --      --   --   --  100  --      --   --   --  255  --    INR  --   --   --   --  1.97*  --      --   --   --  140 140   POTASSIUM 3.6  --   --   --  3.6 3.8   CHLORIDE 103  --   --   --  102 102   CO2 27  --   --   --  27 26   BUN 17.5  --   --   --  16.3 19.3   CR 1.01  --   --   --  1.04 1.06   ANIONGAP 11  --   --   --  11 12   ASHELY 9.4  --   --   --  9.1 8.9   * 123* 106*   < > 104*  108*    < > = values in this interval not displayed.       No results found for this or any previous visit (from the past 24 hour(s)).      Assessment & Plan      Mike Schultz is a 73 year old male with PMH of HTN, HFpEF, gout, hx amiodarone pulmonary toxicity, aortic stenosis, bell's palsy (right side about 2 years ago), herpes simplex, HLD, atrial fibrillation, BOWEN, pulmonary nodules who presented to the ED on 9/5/24 for evaluation of stroke-like symptoms.     Patient's wife states he woke up around 0700 with slurred speech, facial droop. Last well known was around 10:00 pm on 9/4, although patient states he woke up at 0230 to eat cereal and he felt normal. Patient denies chest pain, shortness of breath, fevers, cough, headache, vision changes, new limb numbness or weakness. He has a history of right sided Bell's palsy. Patient took his Xarelto this morning around 0800.     In the ED, afebrile with HR 77 and /84, saturating 94%. CBC and BMP unremarkable. Troponin 32. INR 1.97. EKG SR with 1st degree AV block, prolonged  ms. CT head NAD. CTA head and neck showed moderate left vertebral artery stenosis, mild-to-moderate stenoses of the cavernous and supraclinoid ICAs from atherosclerotic disease. CT perfusion imagines suggests possible bandlike area of ischemia/infarct in the posterior right frontal lobe region. This would likely be of a distal right M3/M4 vessel which is not well visualized by this CTA.      Acute infarct posterior right frontal lobe  *Did not receive thrombolytics due to PTA anticoagulation  - Admit to Neuro floor  - Stroke Neurology consultation  - Brain MRI w/wo showed acute/subacute appearing infarct in the posterior right frontal lobe  -CT angio head and neck does not show any large vessel occlusion.  - Neurochecks and vital signs q4h  - Goal SBP <180 per stroke neuro  - Statin: Atorvastatin 40 daily  - Telemetry, EKG  - ECHO +/- bubble done.  Read pending.  - Bedside Glucose  Monitoring  - Lipid panel shows , non-   - hemoglobin A1c 6.5  - PT/OT/SLP .  Cleared for regular diet  -Stroke neurology recommended obtaining CTA angiogram of the heart to evaluate for thrombus.  Can consider outpatient RAMIRO if CTA cannot be done today.  -CT chest abdomen pelvis with contrast ordered to evaluate for underlying malignancy  - Stroke Education  - Euthermia, Euglycemia  -Continue chronic anticoagulation with rivaroxaban 20 mg daily     Chronic HFpEF  Moderate to severe aortic stenosis  Acute on chronic hypoxic respiratory failure (uses 3 L at HS)  *Follows with [facility]ology. [facility] for acute on chronic HFpEF requiring Bumex drip and assistance from both cardiology and nephrology. Diuresis was noted to be challenging due to episodes of hypotension and ROBIN. Right heart cath 7/31/24 showed moderate elevated right heart filling pressures. Discharged on Bumex 3 mg BID.  *He was noted to have worsening ROBIN on 8/21 and his PTA bumex, chlorthalidone, spironolactone, losartan were all held. With improvement of his renal function he was restarted on lower doses of bumex 2 mg BID and losartan 12.5 mg daily 8/27.  *Current weight 294 lbs, stable from discharge 8/9 but he states he was down to around 287 lbs at home  *Currently saturating 94% on 2 L O2. He uses 3 L O2 at HS and has a prescription for O2 PRN, but is typically on RA during the day.  -proBNP within normal limits.  Chest x-ray shows no significant interval change.  Lungs clear.  No effusions.   -Increased prior to admission Bumex from 2 in the morning and 1 in the afternoon to 2 mg twice daily.  - Hold PTA Losartan, Spironolactone, Chlorthalidone to allow room for permissive HTN in the setting of acute CVA  - Cardiology consult.  - Strict I&O  - Daily weights  - Low sodium diet, 2 L fluid restriction once cleared for diet by SLP     ROBIN due to cardiorenal syndrome and diuretic use,  resolved  *Creatinine peaked at 3.26 on 8/21, and his PTA bumex, chlorthalidone, spironolactone, losartan were all held. With improvement of his renal function his bumex and losartan were restarted 8/27 at lower doses.  *Creatinine currently improved down to 1.04. He has a history of wide fluctuations in renal function but baseline creatinine appears to be around 1.5. Follows with nephrology.  - Holding PTA Losartan, Spironolactone, Chlorthalidone as noted above given his acute CVA  -On Bumex 2 mg twice daily.  Continue daily BMP.  -Avoid nephrotoxic medications.  Strict I's and O's.     HTN  *Most recent /73  - Continue PTA Bumex  - Holding PTA Spironolactone, Chlorthalidone, Losartan, Sotalol given his acute CVA  - IV Hydralazine PRN SBP >180     Persistent atrial fibrillation  Long term current use of anticoagulation  *Amiodarone discontinued 11/2023 due to pneumonitis and he has failed Flecainide in the past.  *Follows with Dr. Day. He has a history of multiple ablations and cardioversions in the past which have subsequently failed. Most recent cardioversion 7/25/24 and currently in SR  *He has hx nocturnal bradycardia and pauses  - Holding PTA Sotalol given acute CVA  - Continue PTA Xarelto     Prolonged QT  *QTc today is 543 ms, was ranging 500-530 last hospitalization. Follows with EP. His PTA Metoprolol was switched to Sotalol last admission.  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 Follow up CT 03/2024 showed bilateral interstitial infiltrates with likely component of fibrosis. He was hospitalized 05/2024 with multiple issues including relapsed inflammatory lung process and put on another prolonged steroid taper by pulmonology.  *Seen by pulmonology as inpatient 8/9/24 with no concern for ongoing pneumonitis contributing  "to his dyspnea and his steroids were discontinued.  - Follow up with pulmonology as outpatient for PFTs and repeat CT     Chronic stable medical conditions  BOWEN  History of alcohol use with intermittent spells of sobriety  Bell's palsy (right side, about 2 years ago)  Lung nodules  BPH  Severe obesity       Clinically Significant Risk Factors               # Coagulation Defect: INR = 1.97 (Ref range: 0.85 - 1.15) and/or PTT = 37 Seconds (Ref range: 22 - 38 Seconds), will monitor for bleeding    # Hypertension: Noted on problem list  # Chronic heart failure with preserved ejection fraction: heart failure noted on problem list and last echo with EF >50%         # DMII: A1C = 6.5 % (Ref range: <5.7 %) within past 6 months, PRESENT ON ADMISSION  # Severe Obesity: Estimated body mass index is 44.7 kg/m  as calculated from the following:    Height as of this encounter: 1.727 m (5' 8\").    Weight as of this encounter: 133.4 kg (294 lb)., PRESENT ON ADMISSION     # Financial/Environmental Concerns:         DVT Prophylaxis: Pneumatic Compression Devices  Code Status: Full Code  Medically Ready for Discharge: Anticipated Tomorrow    Greater than 55 minutes spent on documentation review, lab review, imaging review, examining the patient and discussing care with the patient at bedside.  And reviewing neurology recommendations.    Yomaira Cunningham MD, MD  [phone](p)   "

## 2024-09-06 NOTE — PROGRESS NOTES
09/06/24 0800   Appointment Info   Signing Clinician's Name / Credentials (PT) Mireya Gallegos, DPDANIEL   Living Environment   People in Home spouse   Current Living Arrangements house   Home Accessibility stairs to enter home;stairs within home   Number of Stairs, Main Entrance 2   Stair Railings, Main Entrance none   Number of Stairs, Within Home, Primary greater than 10 stairs   Stair Railings, Within Home, Primary railings safe and in good condition   Transportation Anticipated family or friend will provide   Living Environment Comments Pt reports living in house w/ spouse w/ 2 EDENILSON. Has 13 steps within home up to office. Walk in shower w/ grab bars and shower chair.   Self-Care   Usual Activity Tolerance good   Current Activity Tolerance moderate   Regular Exercise No   Equipment Currently Used at Home walker, rolling   Fall history within last six months no   Activity/Exercise/Self-Care Comment Pt reports being IND w/ all ADL's at baseline prior to admission.   General Information   Onset of Illness/Injury or Date of Surgery 09/05/24   Referring Physician Cindy Scott, HAROLDO   Patient/Family Therapy Goals Statement (PT) go home   Pertinent History of Current Problem (include personal factors and/or comorbidities that impact the POC) Mike Schultz is a 73 year old male with PMH of HTN, HFpEF, gout, hx amiodarone pulmonary toxicity, aortic stenosis, bell's palsy (right side about 2 years ago), herpes simplex, HLD, atrial fibrillation, BOWEN, pulmonary nodules who presented to the ED on 9/5/24 for evaluation of stroke-like symptoms.   Existing Precautions/Restrictions fall   Cognition   Affect/Mental Status (Cognition) WFL   Orientation Status (Cognition) oriented x 4   Follows Commands (Cognition) WFL   Pain Assessment   Patient Currently in Pain No   Posture    Posture Forward head position   Range of Motion (ROM)   Range of Motion ROM is WFL   Strength (Manual Muscle Testing)   Strength (Manual Muscle  Testing) Deficits observed during functional mobility   Bed Mobility   Comment, (Bed Mobility) IND   Transfers   Comment, (Transfers) IND no AD, mild postural sway appears to be at baseline.   Gait/Stairs (Locomotion)   Comment, (Gait/Stairs) IND no AD, improvement in postural sway with 4WW   Balance   Balance Comments IND in room   Clinical Impression   Criteria for Skilled Therapeutic Intervention Yes, treatment indicated   PT Diagnosis (PT) impaired gait   Influenced by the following impairments weakness, pain   Functional limitations due to impairments fall risk   Clinical Presentation (PT Evaluation Complexity) stable   Clinical Presentation Rationale PMH   Clinical Decision Making (Complexity) low complexity   Planned Therapy Interventions (PT) balance training;bed mobility training;gait training;home exercise program;neuromuscular re-education;patient/family education;ROM (range of motion);stair training;strengthening;stretching;transfer training   Risk & Benefits of therapy have been explained evaluation/treatment results reviewed;care plan/treatment goals reviewed;risks/benefits reviewed;current/potential barriers reviewed;participants voiced agreement with care plan;participants included;patient   PT Total Evaluation Time   PT Eval, Low Complexity Minutes (71938) 5   Physical Therapy Goals   PT Frequency One time eval and treatment only   PT Predicted Duration/Target Date for Goal Attainment 09/06/24   PT Goals Bed Mobility;Transfers;Gait;Stairs   PT: Bed Mobility Independent;Goal Met   PT: Transfers Independent;Goal Met   PT: Gait Independent;150 feet;Goal Met   PT: Stairs Independent;3 stairs;Rail on left;Goal Met   Interventions   Interventions Quick Adds Gait Training   Gait Training   Gait Training Minutes (11697) 10   Symptoms Noted During/After Treatment (Gait Training) shortness of breath   Treatment Detail/Skilled Intervention Pt up in bathroom IND upon therapist arrival,agreeable to session. Amb  250ft no AD with slow speed, SBA progressing to IND. Standing breaks throughout d/t SOB. VSS on 2L. Edu on energy conservation and standing breaks, using walker as needed ( pt has at home). Trialed stairs x 4 with L side railing, SBA with significant SOB though sats remained stable. Edu on stopping to rest every 4 stairs when completing stairs up to office upon DC. Weaned down to 1L on return, VSS. Okayed by RN to be IND in room.   Distance in Feet 250ft   PT Discharge Planning   PT Plan DC   PT Discharge Recommendation (DC Rec) home with home care physical therapy;home with assist   PT Rationale for DC Rec Pt at baseline mobility, limited by SOB and decreased activity tolerance which can be managed with IPOT. Pt has met all IPPT goals, will sign off and rec pt continue walking program 3x daily.  Rec home with assist from wife as needed for IADLS, rec HCPT to progress endurance.   PT Brief overview of current status IND in room, okayed by RN. Goals of therapy will be to address safe mobility and make recs for d/c to next level of care. Pt and RN will continue to follow all falls risk precautions as documented by RN staff while hospitalized   Total Session Time   Timed Code Treatment Minutes 10   Total Session Time (sum of timed and untimed services) 15

## 2024-09-06 NOTE — TELEPHONE ENCOUNTER
Called patient to move them to 9/26 [facility] with Dr. Lazo instead due to the patient being in the hospital -    LVM and callbak information    [phone]    Larissa Lozano  Structural Heart Procedure   [facility]/ [facility]

## 2024-09-06 NOTE — PROGRESS NOTES
09/06/24 0715   Appointment Info   Signing Clinician's Name / Credentials (OT) Jean Carl, OTR/L   Living Environment   People in Home spouse   Current Living Arrangements house   Home Accessibility stairs to enter home;stairs within home   Number of Stairs, Main Entrance 2   Stair Railings, Main Entrance none   Number of Stairs, Within Home, Primary greater than 10 stairs  (13 to office)   Stair Railings, Within Home, Primary railings safe and in good condition   Transportation Anticipated family or friend will provide   Living Environment Comments Pt reports living in house w/ spouse w/ 2 EDENILSON. Has 13 steps within home up to office. Walk in shower w/ grab bars and shower chair.   Self-Care   Usual Activity Tolerance good   Current Activity Tolerance moderate   Equipment Currently Used at Home none   Fall history within last six months no   Activity/Exercise/Self-Care Comment Pt reports being IND w/ all ADL's at baseline prior to admission.   Instrumental Activities of Daily Living (IADL)   Previous Responsibilities meal prep;housekeeping;laundry;medication management;finances;driving   IADL Comments Pt reports being IND w/ all IADL's at baseline prior to admission. Pt reports that they still drive.   General Information   Onset of Illness/Injury or Date of Surgery 09/05/24   Referring Physician Cindy Scott PA-C   Patient/Family Therapy Goal Statement (OT) Return to home.   Additional Occupational Profile Info/Pertinent History of Current Problem Mike Schultz is a 73 year old male with PMH of HTN, HFpEF, gout, hx amiodarone pulmonary toxicity, aortic stenosis, bell's palsy (right side about 2 years ago), herpes simplex, HLD, atrial fibrillation, BOWEN, pulmonary nodules who presented to the ED on 9/5/24 for evaluation of stroke-like symptoms.   Existing Precautions/Restrictions fall   Cognitive Status Examination   Orientation Status orientation to person, place and time   Visual Perception   Visual  Impairment/Limitations WFL   Sensory   Sensory Quick Adds sensation intact   Pain Assessment   Patient Currently in Pain No   Range of Motion Comprehensive   General Range of Motion no range of motion deficits identified   Strength Comprehensive (MMT)   Comment, General Manual Muscle Testing (MMT) Assessment RUE WFL, Mild decreased strength in dominant LUE  strength but remains WFL   Bed Mobility   Bed Mobility supine-sit;sit-supine   Supine-Sit Bennett (Bed Mobility) modified independence   Sit-Supine Bennett (Bed Mobility) modified independence   Transfers   Transfers sit-stand transfer;toilet transfer   Sit-Stand Transfer   Sit-Stand Bennett (Transfers) supervision   Toilet Transfer   Type (Toilet Transfer) stand-sit;sit-stand   Bennett Level (Toilet Transfer) supervision   Activities of Daily Living   BADL Assessment/Intervention lower body dressing;grooming;toileting   Lower Body Dressing Assessment/Training   Position (Lower Body Dressing) edge of bed sitting   Bennett Level (Lower Body Dressing) minimum assist (75% patient effort)   Grooming Assessment/Training   Position (Grooming) sink side   Bennett Level (Grooming) supervision   Toileting   Bennett Level (Toileting) supervision   Clinical Impression   Criteria for Skilled Therapeutic Interventions Met (OT) Yes, treatment indicated   OT Diagnosis Decreased ADL independence   Influenced by the following impairments Decreased activity tolerance   OT Problem List-Impairments impacting ADL problems related to;activity tolerance impaired   Assessment of Occupational Performance 1-3 Performance Deficits   Identified Performance Deficits LB dressing, home mgmt   Planned Therapy Interventions (OT) ADL retraining;home program guidelines;progressive activity/exercise;risk factor education   Clinical Decision Making Complexity (OT) problem focused assessment/low complexity   Risk & Benefits of therapy have been explained  evaluation/treatment results reviewed;care plan/treatment goals reviewed;risks/benefits reviewed;current/potential barriers reviewed;patient   OT Total Evaluation Time   OT Eval, Low Complexity Minutes (21972) 10   OT Goals   Therapy Frequency (OT) Daily   OT Predicted Duration/Target Date for Goal Attainment 09/13/24   OT Goals Hygiene/Grooming;Lower Body Dressing;OT Goal 1   OT: Hygiene/Grooming independent;while standing   OT: Lower Body Dressing Modified independent;using adaptive equipment;including set-up/clothing retrieval   OT: Goal 1 Patient will be able to demonstrate IND w/ BUE HEP w/ 0 VC's.   Self-Care/Home Management   Self-Care/Home Mgmt/ADL, Compensatory, Meal Prep Minutes (09430) 25   Treatment Detail/Skilled Intervention OT: Greeted pt long sitting in bed and they were pleasantly agreeable for OT evaluation. Pt reporting no pain while at rest and on 2 L of O2 via NC w/ SpO2 at 96% while at rest. Pt reports wearing O2 at night while at home but has noticed increased effort needed when up and ambulating prior to admission. Pt completed sup > sit EOB w/ Mod IND and use of bed rail on R side. Pt able to maintain seated balance IND. Tasked pt w/ doffing/donning B socks and pt was able to doff L sock w/ supervision and was noted to require increased effort. Pt requesting assist from writer to don; Min A to complete. Educated pt on AE (reacher, sock aid) and how it can be used to assist in completing LB dressing and pt verbalized understanding. Supervision sit > stand edge of bed w/ no AD. Pt ambulated within room to/from bathroom w/ SBA and additional assist for line mgmt. Supervision to complete toilet transfer and 1 g/h task sinkside. Pt w/ no concerns regarding bathroom ADL's. Pt then returned to room and seated edge of bed. SpO2 at 88% and returned to >90% within 10 seconds of PLB. Educated pt on increasing activity tolerance and pacing self. Pt remained seated EOB at end of session as breakfast tray  arriving. All needs met.   Symptoms Noted During/After Treatment (Meal Preparation/Planning Training) fatigue   OT Discharge Planning   OT Plan LB dressing w/ AE, standing activity tolerance, EC/PLB education   OT Discharge Recommendation (DC Rec) home with assist;home with home care occupational therapy   OT Rationale for DC Rec Pt is currently functioning below baseline d/t decreased activity tolerance. Pt lives in house w/ spouse and reports being IND w/ all I/ADL's at baseline. Pt currently requiring 2 L of O2 at all times. Anticipate w/ continued IP therapy and addressing O2 needs, pt will be safe to d/c back to home w/ assist and home therapies.   OT Brief overview of current status Goals of therapy will be to address safe mobility and make recs for d/c to next level of care. Pt and RN will continue to follow all falls risk precautions as documented by RN staff while hospitalized.   Total Session Time   Timed Code Treatment Minutes 25   Total Session Time (sum of timed and untimed services) 35

## 2024-09-06 NOTE — PLAN OF CARE
Goal Outcome Evaluation:  9/5/2024 [id number]  Reason for admission:Facial droop   Dysarthria   Acute CVA (cerebrovascular accident)      Cognition/Mentation:A&Ox 4  Neuros/CMS:WDL ex left facial droop, deviated tongue left, left sided weakness 4/5, slurred speech  VS:VSS on 2l o2, CPAP wear at night. - Goal SBP <180 per stroke neuro  Cardiac:Tele 1st AVB   GI:WDL  :WDL, use urinal at bedside   Pulmonary:SOB, per pt its baseline and can be due his HF  Pain:denies pain  Drains/Lines:PIV SL  Skin:Sam BLE, Edema +2, compression socks in placed   Activity:SBA, sit up at the edge bed sometimes.   Diet:Cardiac Soft chew, thin liquids, no straws, 2000 ml fluid restriction  Discharge:Pending  - pt refused bed alarm but call appropriately when in needs. Echo  PT/OT/SLP pending.

## 2024-09-06 NOTE — PROGRESS NOTES
Patient is AO X 4; VSS; on RA.  Brayden has lurdes lower extremity edema; on Bumex BID; in no acute discomfort; on RA; DELEON with exertion.  CT abdomen/pelvis was completed; awaiting results followed by discharge.  Dr. Cunningham will need to approve the discharge.

## 2024-09-06 NOTE — DISCHARGE SUMMARY
[facility]    Discharge Summary  Hospitalist    Date of Admission:  9/5/2024  Date of Discharge:  9/6/2024    Discharge Diagnoses      Acute CVA (cerebrovascular accident) (H)  Facial droop  Dysarthria  Essential hypertension  Acute decompensated heart failure (H)    History of Present Illness   Mike Schultz is an 73 year old male who presented with     Hospital Course   Mike Schultz was admitted on 9/5/2024.  The following problems were addressed during his hospitalization:      Mike Schultz is a 73 year old male with PMH of HTN, HFpEF, gout, hx amiodarone pulmonary toxicity, aortic stenosis, bell's palsy (right side about 2 years ago), herpes simplex, HLD, atrial fibrillation, BOWEN, pulmonary nodules who presented to the ED on 9/5/24 for evaluation of stroke-like symptoms.     Patient's wife states he woke up around 0700 with slurred speech, facial droop. Last well known was around 10:00 pm on 9/4, although patient states he woke up at 0230 to eat cereal and he felt normal. Patient denies chest pain, shortness of breath, fevers, cough, headache, vision changes, new limb numbness or weakness. He has a history of right sided Bell's palsy. Patient took his Xarelto this morning around 0800.     In the ED, afebrile with HR 77 and /84, saturating 94%. CBC and BMP unremarkable. Troponin 32. INR 1.97. EKG SR with 1st degree AV block, prolonged  ms. CT head NAD. CTA head and neck showed moderate left vertebral artery stenosis, mild-to-moderate stenoses of the cavernous and supraclinoid ICAs from atherosclerotic disease. CT perfusion imagines suggests possible bandlike area of ischemia/infarct in the posterior right frontal lobe region. This would likely be of a distal right M3/M4 vessel which is not well visualized by this CTA.      Acute infarct posterior right frontal lobe  *Did not receive thrombolytics due to PTA anticoagulation  - Admit to Neuro floor  - Stroke  Neurology consultation  - Brain MRI w/wo showed acute/subacute appearing infarct in the posterior right frontal lobe  -CT angio head and neck does not show any large vessel occlusion.  - Neurochecks and vital signs q4h  - Goal SBP <180 per stroke neuro  - Statin: Atorvastatin 40 daily  - Telemetry, EKG  - ECHO +/- bubble done.  No intracardiac thrombus.  - Bedside Glucose Monitoring  - Lipid panel shows , non-   - hemoglobin A1c 6.5  - PT/OT/SLP .  Cleared for regular diet  -Stroke neurology recommended obtaining CTA angiogram of the heart to evaluate for thrombus.  Can consider outpatient RAMIRO if CTA cannot be done today.  -CT chest abdomen pelvis with contrast with no acute findings other than aortic stenosis   - Stroke Education  - Euthermia, Euglycemia  -Continue chronic anticoagulation with rivaroxaban 20 mg daily     Chronic HFpEF  Moderate to severe aortic stenosis  Acute on chronic hypoxic respiratory failure (uses 3 L at HS)  *Follows with [facility] cardiology. Recently admitted at [facility] 7/25/24-8/9/24 for acute on chronic HFpEF requiring Bumex drip and assistance from both cardiology and nephrology. Diuresis was noted to be challenging due to episodes of hypotension and ROBIN. Right heart cath 7/31/24 showed moderate elevated right heart filling pressures. Discharged on Bumex 3 mg BID.  *He was noted to have worsening ROBIN on 8/21 and his PTA bumex, chlorthalidone, spironolactone, losartan were all held. With improvement of his renal function he was restarted on lower doses of bumex 2 mg BID and losartan 12.5 mg daily 8/27.  *Current weight 294 lbs, stable from discharge 8/9 but he states he was down to around 287 lbs at home  *Currently saturating 94% on 2 L O2. He uses 3 L O2 at HS and has a prescription for O2 PRN, but is typically on RA during the day.  -proBNP within normal limits.  Chest x-ray shows no significant interval change.  Lungs clear.  No effusions.   -Increased prior to  admission Bumex from 2 in the morning and 1 in the afternoon to 2 mg twice daily.  - Hold PTA Losartan, Spironolactone, Chlorthalidone to allow room for permissive HTN in the setting of acute CVA  -Restarted losartan at the time of discharge.  Discontinued spironolactone and chlorthalidone since they were already held prior to this admission.  - Cardiology consult.  Agree with above.  Patient does have an outpatient follow-up to discuss TAVR for his aortic stenosis.  - Strict I&O  - Daily weights  - Low sodium diet, 2 L fluid restriction once cleared for diet by SLP     ROBIN due to cardiorenal syndrome and diuretic use, resolved  *Creatinine peaked at 3.26 on 8/21, and his PTA bumex, chlorthalidone, spironolactone, losartan were all held. With improvement of his renal function his bumex and losartan were restarted 8/27 at lower doses.  *Creatinine currently improved down to 1.04. He has a history of wide fluctuations in renal function but baseline creatinine appears to be around 1.5. Follows with nephrology.  - Holding PTA Losartan, Spironolactone, Chlorthalidone as noted above given his acute CVA  -On Bumex 2 mg twice daily.  Continue daily BMP.  -Avoid nephrotoxic medications.  Strict I's and O's.     HTN  *Most recent /73  - Continue PTA Bumex  - Holding PTA Spironolactone, Chlorthalidone, Losartan, Sotalol given his acute CVA  - IV Hydralazine PRN SBP >180     Persistent atrial fibrillation  Long term current use of anticoagulation  *Amiodarone discontinued 11/2023 due to pneumonitis and he has failed Flecainide in the past.  *Follows with Dr. Day. He has a history of multiple ablations and cardioversions in the past which have subsequently failed. Most recent cardioversion 7/25/24 and currently in SR  *He has hx nocturnal bradycardia and pauses  - Holding PTA Sotalol given acute CVA  - Continue PTA Xarelto  -  cardiology recommended continuing to hold sotalol after discharge 1 day follow-up with the EP.   "It can be restarted under their supervision.     Prolonged QT  *QTc today is 543 ms, was ranging 500-530 last hospitalization. Follows with EP. His PTA Metoprolol was switched to Sotalol last admission.  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 Follow up CT 03/2024 showed bilateral interstitial infiltrates with likely component of fibrosis. He was hospitalized 05/2024 with multiple issues including relapsed inflammatory lung process and put on another prolonged steroid taper by pulmonology.  *Seen by pulmonology as inpatient 8/9/24 with no concern for ongoing pneumonitis contributing to his dyspnea and his steroids were discontinued.  - Follow up with pulmonology as outpatient for PFTs and repeat CT     Chronic stable medical conditions  BOWEN  History of alcohol use with intermittent spells of sobriety  Bell's palsy (right side, about 2 years ago)  Lung nodules  BPH  Severe obesity        Clinically Significant Risk Factors               # Coagulation Defect: INR = 1.97 (Ref range: 0.85 - 1.15) and/or PTT = 37 Seconds (Ref range: 22 - 38 Seconds), will monitor for bleeding    # Hypertension: Noted on problem list  # Chronic heart failure with preserved ejection fraction: heart failure noted on problem list and last echo with EF >50%         # DMII: A1C = 6.5 % (Ref range: <5.7 %) within past 6 months, PRESENT ON ADMISSION  # Severe Obesity: Estimated body mass index is 44.63 kg/m  as calculated from the following:    Height as of this encounter: 1.727 m (5' 8\").    Weight as of this encounter: 133.1 kg (293 lb 8 oz)., PRESENT ON ADMISSION     # Financial/Environmental Concerns:         Yomaira Cunningham MD, MD      Code Status   Full Code       Primary Care Physician   Ranjan Courtney    Physical Exam   Temp: 98.1  F (36.7  C) Temp src: Oral " BP: (!) 131/95 Pulse: 83   Resp: 19 SpO2: 94 % O2 Device: None (Room air) Oxygen Delivery: 2 LPM  Vitals:    09/05/24 0831 09/05/24 0840 09/06/24 1427   Weight: 133.4 kg (294 lb) 133.4 kg (294 lb) 133.1 kg (293 lb 8 oz)     Vital Signs with Ranges  Temp:  [97.6  F (36.4  C)-98.1  F (36.7  C)] 98.1  F (36.7  C)  Pulse:  [63-83] 83  Resp:  [14-20] 19  BP: (121-145)/(76-95) 131/95  SpO2:  [92 %-98 %] 94 %  I/O last 3 completed shifts:  In: 780 [P.O.:780]  Out: 650 [Urine:650]    Physical Exam  Constitutional:       Appearance: Normal appearance. He is obese.   Cardiovascular:      Rate and Rhythm: Normal rate. Rhythm irregular.      Pulses: Normal pulses.      Heart sounds: Murmur heard.   Pulmonary:      Effort: Pulmonary effort is normal. No respiratory distress.      Breath sounds: Normal breath sounds.   Abdominal:      General: Bowel sounds are normal. There is no distension.      Tenderness: There is no abdominal tenderness. There is no guarding.   Skin:     General: Skin is warm and dry.   Neurological:      Comments: Left facial droop           Discharge Disposition   Discharged to home  Condition at discharge: Stable    Consultations This Hospital Stay   SPEECH LANGUAGE PATH ADULT IP CONSULT  NEUROLOGY IP STROKE CONSULT  PHARMACY IP CONSULT  PHARMACY IP CONSULT  PHARMACY IP CONSULT  PHYSICAL THERAPY ADULT IP CONSULT  OCCUPATIONAL THERAPY ADULT IP CONSULT  REHAB ADMISSIONS LIAISON IP CONSULT  CARE MANAGEMENT / SOCIAL WORK IP CONSULT  CARDIOLOGY IP CONSULT  SMOKING CESSATION PROGRAM IP CONSULT    Time Spent on this Encounter   I, Yomaira Cunningham MD, personally saw the patient today and spent greater than 30 minutes discharging this patient.    Discharge Orders      Adult Mental Health  Referral      Adult Neurology  Referral      Home Care Referral      Reason for your hospital stay    stroke     Follow-up and recommended labs and tests     Follow up with primary care provider, Ranjan Courtney,  within 7 days for hospital follow- up.  The following labs/tests are recommended: cbc, bmp in 1 week.     Activity    Your activity upon discharge: activity as tolerated     Echocardiogram RAMIRO    Administration of IV contrast will be tailored to this examination per the appropriate written protocol listed in the Echocardiography department Protocol Book, or by the supervising Cardiologist. This may result in an order change.    Use of contrast is at the discretion of the supervising Cardiologist.     Diet    Follow this diet upon discharge: Current Diet:Orders Placed This Encounter      Room Service      Fluid restriction 2000 ML FLUID      Combination Diet Easy to Chew (level 7); Thin Liquids (level 0) (no straws with direct supervision/assist: small single sips and bites, one at a time, alternate solids and liquids, check left side for pocketing and complete oral cares after meals....     Discharge Medications   Current Discharge Medication List        START taking these medications    Details   atorvastatin (LIPITOR) 40 MG tablet Take 1 tablet (40 mg) by mouth every evening.  Qty: 60 tablet, Refills: 3    Associated Diagnoses: Acute decompensated heart failure (H)           CONTINUE these medications which have CHANGED    Details   losartan (COZAAR) 25 MG tablet Take 0.5 tablets (12.5 mg) by mouth daily.    Associated Diagnoses: Essential hypertension           CONTINUE these medications which have NOT CHANGED    Details   allopurinol (ZYLOPRIM) 300 MG tablet Take 1 tablet (300 mg) by mouth daily  Qty: 90 tablet, Refills: 4    Associated Diagnoses: Gout of foot, unspecified cause, unspecified chronicity, unspecified laterality      bumetanide (BUMEX) 1 MG tablet 2 mg am and 2 mg pm  Qty: 360 tablet, Refills: 1    Comments: Update profile  Associated Diagnoses: Acute on chronic congestive heart failure, unspecified heart failure type (H)      cetirizine (ZYRTEC) 10 MG tablet Take 10 mg by mouth every evening       finasteride (PROPECIA) 1 MG tablet Take 1 tablet (1 mg) by mouth daily  Qty: 90 tablet, Refills: 2    Associated Diagnoses: Alopecia      potassium chloride pushpa ER (KLOR-CON M20) 20 MEQ CR tablet Take 2 tablets (40 mEq) by mouth 2 times daily  Qty: 120 tablet, Refills: 0    Comments: Future refills by PCP Dr. Ranjan Courtney with phone number [phone].  Associated Diagnoses: Hypokalemia; Benign essential hypertension      rivaroxaban ANTICOAGULANT (XARELTO ANTICOAGULANT) 20 MG TABS tablet Take 1 tablet (20 mg) by mouth daily (with dinner)  Qty: 90 tablet, Refills: 3    Associated Diagnoses: Persistent atrial fibrillation (H)      sotalol (BETAPACE) 80 MG tablet Take 1 tablet (80 mg) by mouth every 12 hours  Qty: 60 tablet, Refills: 0    Comments: Future refills by PCP Dr. Ranjan Courtney with phone number [phone].  Associated Diagnoses: Persistent atrial fibrillation (H)      miconazole (MICATIN) 2 % external powder Apply topically 2 times daily  Qty: 85 g, Refills: 0    Associated Diagnoses: Yeast infection of the skin      psyllium (METAMUCIL/KONSYL) capsule Take 1-2 capsules by mouth daily           STOP taking these medications       chlorthalidone (HYGROTON) 25 MG tablet Comments:   Reason for Stopping:         spironolactone (ALDACTONE) 25 MG tablet Comments:   Reason for Stopping:             Allergies   Allergies   Allergen Reactions    Amiodarone Shortness Of Breath     Suspected amiodarone toxicity involving lungs     Data   Recent Labs   Lab Test 09/06/24  1008 09/05/24  0840 08/03/24  0616 08/02/24  0531 08/01/24  0606 11/27/23  0648 11/26/23  0455   WBC 7.9 9.2 10.2   < >  --    < >  --    HGB 13.9 14.3 12.6*   < >  --    < >  --     100 101*   < >  --    < >  --     255 223   < >  --    < >  --    INR  --  1.97*  --   --  2.47*  --  2.10*    < > = values in this interval not displayed.      Recent Labs   Lab Test 09/06/24  1008 09/06/24  0814 09/06/24  0228 09/05/24  1713  09/05/24  0840 09/04/24  1021     --   --   --  140 140   POTASSIUM 3.6  --   --   --  3.6 3.8   CHLORIDE 103  --   --   --  102 102   CO2 27  --   --   --  27 26   BUN 17.5  --   --   --  16.3 19.3   CR 1.01  --   --   --  1.04 1.06   ANIONGAP 11  --   --   --  11 12   ASHELY 9.4  --   --   --  9.1 8.9   * 123* 106*   < > 104* 108*    < > = values in this interval not displayed.         Results for orders placed or performed during the hospital encounter of 09/05/24   CT Head w/o Contrast    Narrative    CT SCAN OF THE HEAD WITHOUT CONTRAST September 5, 2024 8:50 AM     HISTORY: Code Stroke to evaluate for potential thrombolysis and  thrombectomy. Left-sided weakness.    TECHNIQUE: Axial images of the head and coronal reformations without  IV contrast material. Radiation dose for this scan was reduced using  automated exposure control, adjustment of the mA and/or kV according  to patient size, or iterative reconstruction technique.    COMPARISON: Brain MR 2/17/2023.    FINDINGS: There is no evidence of intracranial hemorrhage, mass, acute  infarct or anomaly. The ventricles are normal in size, shape and  configuration. The brain parenchyma and subarachnoid spaces are  normal.     The visualized portions of the sinuses and mastoids appear normal. The  bony calvarium and bones of the skull base appear intact.       Impression    IMPRESSION: No evidence of acute intracranial hemorrhage, mass, or  herniation.      KAILASH MARINA MD         SYSTEM ID:  [id number]   CTA Head Neck with Contrast    Narrative    CT ANGIOGRAM OF THE HEAD AND NECK WITH CONTRAST  CT HEAD PERFUSION WITH CONTRAST September 5, 2024 8:53 AM     HISTORY: Code Stroke to evaluate for potential thrombolysis and  thrombectomy. Left-sided weakness.    TECHNIQUE: CT angiography with an injection of 67 mL Isovue 370 IV  with scans through the head and neck. Images were transferred to a  separate 3-D workstation where multiplanar reformations and  3-D images  were created. Estimates of carotid stenoses are made relative to the  distal internal carotid artery diameters except as noted. Radiation  dose for this scan was reduced using automated exposure control,  adjustment of the mA and/or kV according to patient size, or iterative  reconstruction technique.     Perfusion scans were performed with injection of additional IV  contrast. These images were processed on a separate 3-D workstation.     COMPARISON: None.     CT HEAD FINDINGS: CT perfusion images demonstrate bandlike area of  increased T. Max and time to drain in the posterior right frontal lobe  region concerning for possible acute ischemia/infarct. Question subtle  decrease in cerebral blood flow and cerebral blood volume in that  area.    CT ANGIOGRAM HEAD FINDINGS: Evaluation is difficult given mild motion  artifact and scanner technique. No definite vascular cut off of the  proximal ACAs, MCAs, or PCAs. Evaluation of the more distal  intracranial vessels is limited. Atherosclerotic disease along the  cavernous and supraclinoid ICAs cause mild to moderate stenoses.  Venous circulation is unremarkable.     CT ANGIOGRAM NECK FINDINGS: Scattered atherosclerotic calcification in  the aortic arch without significant stenosis at the origins of the  great vessels.     Right carotid artery: The right common and internal carotid arteries  are patent. Mild atherosclerotic disease at the carotid bifurcation  and proximal internal carotid artery without significant stenosis by  NASCET criteria.     Left carotid artery: The left common and internal carotid arteries are  patent. Mild atherosclerotic disease at the carotid bifurcation and  proximal internal carotid artery without significant stenosis by  NASCET criteria.     Vertebral arteries: Vertebral arteries are patent without evidence of  dissection. Moderate stenosis of the origin of the left vertebral  artery.    Other findings: There are degenerative  changes in the spine.       Impression    IMPRESSION:   1. Patent arteries in the neck without evidence of dissection. Mild  atherosclerotic disease in the carotid arteries bilaterally without  significant stenosis by NASCET criteria. Moderate stenosis at the  origin of the left vertebral artery.  2. Patent proximal major intracranial arteries without vascular  cutoff. Mild-to-moderate stenoses of the cavernous and supraclinoid  ICAs from atherosclerotic disease. No aneurysm identified.  3. CT perfusion images suggests possible bandlike area of  ischemia/infarct in the posterior right frontal lobe region. This  would likely be of a distal right M3/M4 vessel which is not well  evaluated by this CTA.    Results discussed with Dale Giordano at 9:05 AM on 9/5/2024.    KAILASH MARINA MD         SYSTEM ID:  [id number]   CT Head Perfusion w Contrast - For Tier 2 Stroke    Narrative    CT ANGIOGRAM OF THE HEAD AND NECK WITH CONTRAST  CT HEAD PERFUSION WITH CONTRAST September 5, 2024 8:53 AM     HISTORY: Code Stroke to evaluate for potential thrombolysis and  thrombectomy. Left-sided weakness.    TECHNIQUE: CT angiography with an injection of 67 mL Isovue 370 IV  with scans through the head and neck. Images were transferred to a  separate 3-D workstation where multiplanar reformations and 3-D images  were created. Estimates of carotid stenoses are made relative to the  distal internal carotid artery diameters except as noted. Radiation  dose for this scan was reduced using automated exposure control,  adjustment of the mA and/or kV according to patient size, or iterative  reconstruction technique.     Perfusion scans were performed with injection of additional IV  contrast. These images were processed on a separate 3-D workstation.     COMPARISON: None.     CT HEAD FINDINGS: CT perfusion images demonstrate bandlike area of  increased T. Max and time to drain in the posterior right frontal lobe  region concerning for possible  acute ischemia/infarct. Question subtle  decrease in cerebral blood flow and cerebral blood volume in that  area.    CT ANGIOGRAM HEAD FINDINGS: Evaluation is difficult given mild motion  artifact and scanner technique. No definite vascular cut off of the  proximal ACAs, MCAs, or PCAs. Evaluation of the more distal  intracranial vessels is limited. Atherosclerotic disease along the  cavernous and supraclinoid ICAs cause mild to moderate stenoses.  Venous circulation is unremarkable.     CT ANGIOGRAM NECK FINDINGS: Scattered atherosclerotic calcification in  the aortic arch without significant stenosis at the origins of the  great vessels.     Right carotid artery: The right common and internal carotid arteries  are patent. Mild atherosclerotic disease at the carotid bifurcation  and proximal internal carotid artery without significant stenosis by  NASCET criteria.     Left carotid artery: The left common and internal carotid arteries are  patent. Mild atherosclerotic disease at the carotid bifurcation and  proximal internal carotid artery without significant stenosis by  NASCET criteria.     Vertebral arteries: Vertebral arteries are patent without evidence of  dissection. Moderate stenosis of the origin of the left vertebral  artery.    Other findings: There are degenerative changes in the spine.       Impression    IMPRESSION:   1. Patent arteries in the neck without evidence of dissection. Mild  atherosclerotic disease in the carotid arteries bilaterally without  significant stenosis by NASCET criteria. Moderate stenosis at the  origin of the left vertebral artery.  2. Patent proximal major intracranial arteries without vascular  cutoff. Mild-to-moderate stenoses of the cavernous and supraclinoid  ICAs from atherosclerotic disease. No aneurysm identified.  3. CT perfusion images suggests possible bandlike area of  ischemia/infarct in the posterior right frontal lobe region. This  would likely be of a distal right  M3/M4 vessel which is not well  evaluated by this CTA.    Results discussed with Dale Giordano at 9:05 AM on 9/5/2024.    KAILASH MARINA MD         SYSTEM ID:  [id number]   MR Brain w/o & w Contrast    Narrative    MRI BRAIN WITHOUT AND WITH CONTRAST  9/5/2024 10:54 AM     HISTORY:  Left facial droop, left upper extremity weakness, slurred  speech.     TECHNIQUE:  Multiplanar, multisequence MRI of the brain without and  with 13 mL Gadavist.    COMPARISON: Head CT from earlier the same day. Brain MR 2/17/2023.     FINDINGS: Bandlike area of restricted diffusion in the posterior right  frontal lobe with mild associated T2 hyperintensity concerning for  acute/subacute infarct. This corresponds with the abnormality seen on  prior CT perfusion images. No corresponding susceptibility  hypointensity to suggest hemorrhagic transformation of infarct. No  other territory of restricted diffusion. No mass effect or midline  shift. Ventricular size is within normal limits without evidence of  hydrocephalus. Mild patchy periventricular white matter T2  hyperintensities which are nonspecific, but most likely related to  chronic microvascular ischemic disease. No suspicious intracranial  enhancement.     The facial structures appear normal.       Impression    IMPRESSION:    1. Acute/subacute appearing infarct in the posterior right frontal  lobe. No evidence of hemorrhagic transformation.   2. Mild nonspecific white matter changes which are likely due to  chronic microvascular ischemic disease.     KAILASH MARINA MD         SYSTEM ID:  [id number]   XR Chest 2 Views    Narrative    CHEST TWO VIEWS September 5, 2024 12:08 PM     HISTORY: Hypoxia.    COMPARISON: 7/25/2024.      Impression    IMPRESSION: No significant interval change. Lungs clear. No pleural  effusions.    ELYSSA TAVARES MD         SYSTEM ID:  [id number]   Echocardiogram Complete - For age > 60 yrs     Value    LVEF  55-60%    Narrative     [id number]  [id number]  [id number]^OLIMPIA^THERESA^E     [facility]  Echocardiography Laboratory  [address] [place] [address]     Name: TOO HOWARD  MRN: [id number]  : 1950  Study Date: 2024 10:39 AM  Age: 73 yrs  Gender: Male  Patient Location: [facility]  Reason For Study: Cerebrovascular Incident  Ordering Physician: THERESA DOAN  Referring Physician: Ranjan Courtney  Performed By: Virgilio Blanco RDCS     BSA: 2.4 m2  Height: 68 in  Weight: 294 lb  HR: 70  BP: 126/70 mmHg  ______________________________________________________________________________  Procedure  Complete Portable Echo Adult. Optison (NDC #[id number]) given intravenously.  Complete Echo Adult.  ______________________________________________________________________________  Interpretation Summary     The visual ejection fraction is 55-60%.  No regional wall motion abnormalities noted.  Moderate to severe valvular aortic stenosis, mean gradient 28mmHg.  ______________________________________________________________________________  Left Ventricle  The left ventricle is normal in size. There is normal left ventricular wall  thickness. Diastolic Doppler findings (E/E' ratio and/or other parameters)  suggest left ventricular filling pressures are indeterminate. The visual  ejection fraction is 55-60%. No regional wall motion abnormalities noted.     Right Ventricle  The right ventricle is normal in size and function.     Atria  Normal left atrial size. Right atrial size is normal. There is no color  Doppler evidence of an atrial shunt.     Mitral Valve  The mitral valve leaflets are mildly thickened. There is trace mitral  regurgitation.     Tricuspid Valve  There is trace tricuspid regurgitation. IVC diameter <2.1 cm collapsing >50%  with sniff suggests a normal RA pressure of 3 mmHg.     Aortic Valve  There is trace aortic regurgitation. The mean AoV pressure gradient is 27.5  mmHg. The  calculated aortic valve are is 1.0 cm^2. Moderate to severe  valvular  aortic stenosis.     Pulmonic Valve  There is trace pulmonic valvular regurgitation.     Vessels  The aortic root is normal size. Ascending aorta dilatation is present.     Pericardium  There is no pericardial effusion.     Rhythm  Sinus rhythm was noted.  ______________________________________________________________________________  MMode/2D Measurements & Calculations  IVSd: 1.2 cm     LVIDd: 4.6 cm  LVIDs: 2.9 cm  LVPWd: 1.3 cm  FS: 36.4 %  LV mass(C)d: 217.4 grams  LV mass(C)dI: 90.3 grams/m2  Ao root diam: 3.4 cm  asc Aorta Diam: 4.1 cm  LVOT diam: 2.2 cm  LVOT area: 3.7 cm2  Ao root diam index Ht(cm/m): 2.0  Ao root diam index BSA (cm/m2): 1.4  Asc Ao diam index BSA (cm/m2): 1.7  Asc Ao diam index Ht(cm/m): 2.4  EF Biplane: 52.3 %  LA Volume (BP): 68.2 ml     LA Volume Index (BP): 28.3 ml/m2  RWT: 0.57  TAPSE: 2.0 cm     Doppler Measurements & Calculations  MV E max seamus: 70.7 cm/sec  MV A max seamus: 72.3 cm/sec  MV E/A: 0.98  MV dec time: 0.22 sec  Ao V2 max: 332.5 cm/sec  Ao max P.0 mmHg  Ao V2 mean: 250.5 cm/sec  Ao mean P.5 mmHg  Ao V2 VTI: 70.3 cm  MARILYN(I,D): 1.0 cm2  MARILYN(V,D): 0.97 cm2  LV V1 max PG: 3.0 mmHg  LV V1 max: 86.6 cm/sec  LV V1 VTI: 18.9 cm  SV(LVOT): 70.5 ml  SI(LVOT): 29.3 ml/m2  PA acc time: 0.11 sec  AV Seamus Ratio (DI): 0.26  MARILYN Index (cm2/m2): 0.42  E/E' avg: 15.3     Lateral E/e': 15.9  Medial E/e': 14.8  RV S Seamus: 11.3 cm/sec     ______________________________________________________________________________  Report approved by: Laurie Caceres 2024 02:54 PM           *Note: Due to a large number of results and/or encounters for the requested time period, some results have not been displayed. A complete set of results can be found in Results Review.

## 2024-09-06 NOTE — PLAN OF CARE
Goal Outcome Evaluation:  9/5/2024 [id number]  Reason for admission:Facial droop   Dysarthria   Acute CVA (cerebrovascular accident)      Cognition/Mentation:A&Ox 4  Neuros/CMS:WDL ex left facial droop, deviated tongue left, left sided weakness 4/5, slurred speech  VS:VSS on 2l o2, CPAP wear at night. - Goal SBP <180 per stroke neuro  Cardiac:Tele 1st AVB   GI:WDL  :WDL, use urinal at bedside   Pulmonary:SOB, per pt its baseline and can be due his HF  Pain:denies pain  Drains/Lines:PIV SL  Skin:Sam BLE, Edema +2, compression socks in placed   Activity:SBA, sit up at the edge bed sometimes.   Diet:Cardiac Soft chew, thin liquids, no straws, 2000 ml fluid restriction  Discharge:Pending  - pt refused bed alarm but call appropriately when in needs. Echo  PT/OT/SLP pending.

## 2024-09-06 NOTE — PROGRESS NOTES
[facility]  Cardiology Progress Note  Date of Service: 09/06/2024  Primary Cardiologist: Dr. Day    Assessment & Plan    Mike Schultz is a 73 year old male with past medical history significant for hypertension, HFpEF, gout, atrial fibrillation with history of amiodarone pulmonary toxicity, aortic stenosis, bell's palsy (right side about 2 years ago), herpes simplex, HLD, BOWEN, and pulmonary nodules admitted on 9/5/2024 with stroke-like symptoms. Cardiology was consulted to evaluate this patient for HFpEF with recent hospitalization 7/25 through 8/9, recent ROBIN due to cardiorenal syndrome and diuretics being managed by outpatient cardiology.     Assessment:  Heart failure with preserved ejection fraction  Recent prolonged hospitalization for acute decompensated heart failure managed with Bumex infusion and guidance of nephrology.  ProBNP 710  CXR 9/5 shows clear lungs, no pleural effusions  Admission weight 294 pounds (was discharged at 294 pounds on 8/9). Was down to 287 post hospitalization, however Cr was severely elevated and diuretics were held. Dry weight likely closer to 290-294.   Echocardiogram 8/5/2024: LVEF 55-60% with moderate concentric LV hypertrophy.   PTA Bumex 2 mg in AM & 1 mg in afternoon, losartan 12.5 mg. Spironolactone and chlorthalidone on hold with recent ROBIN.  Volume status difficult to assess, does have   Moderate to severe aortic stenosis   Seen by structural cardiology team in 5/2024 with no recommendation for intervention yet.  Echo from 8/5/24: Aortic valve with moderate-severe stenosis. Vmax 3.5 m/sec, mean gradient 31 mmHg, MARILYN 0.9 cm2   Hypertension  PTA Spironolactone, Chlorthalidone, Losartan, Sotalol. Now on hold given acute CVA and allowing for permissive HTN  Persistent atrial fibrillation  EKG from 9/5 with prolonged QTc 543 msec. Appears to be intermittently prolonged. EKGs obtained at beginning of previous hospitalization (571 msec 7/31).   PTA  "on Xarelto for anticoagulation, [id number] Score at least 5  PTA sotalol on hold    Plan:   Continue Bumex 2 mg twice daily  Daily standing weights, strict I/Os, 2 GM sodium restriction  Echo pending   GDMT when cleared by neurology  Cardiology appointment with Dr. Day scheduled for 9/10 and with Dr. Muse on 9/12  Cardiology will sign off, please call with any questions    Esha Orozco CNP  [facility]  Pager: [phone]      Interval History   Seen sitting at edge of bed. Denies chest pain. Does have shortness of breath with exertion. Lower extremity edema noted.    Wt 293 today    Physical Exam   Temp: 98.1  F (36.7  C) Temp src: Oral BP: 121/87 Pulse: 74   Resp: 18 SpO2: 92 % O2 Device: None (Room air) Oxygen Delivery: 2 LPM  Vitals:    09/05/24 0831 09/05/24 0840   Weight: 133.4 kg (294 lb) 133.4 kg (294 lb)     Older adult male in no apparent distress. Heart rate and rhythm are regular. +2 systolic murmur. No rub or gallop. Lung sounds are clear throughout. +2 lower extremity edema.     Clinically Significant Risk Factors               # Coagulation Defect: INR = 1.97 (Ref range: 0.85 - 1.15) and/or PTT = 37 Seconds (Ref range: 22 - 38 Seconds), will monitor for bleeding    # Hypertension: Noted on problem list  # Chronic heart failure with preserved ejection fraction: heart failure noted on problem list and last echo with EF >50%         # DMII: A1C = 6.5 % (Ref range: <5.7 %) within past 6 months, PRESENT ON ADMISSION  # Severe Obesity: Estimated body mass index is 44.7 kg/m  as calculated from the following:    Height as of this encounter: 1.727 m (5' 8\").    Weight as of this encounter: 133.4 kg (294 lb)., PRESENT ON ADMISSION     # Financial/Environmental Concerns:          Medications   Current Facility-Administered Medications   Medication Dose Route Frequency Provider Last Rate Last Admin     Current Facility-Administered Medications   Medication Dose Route Frequency Provider Last Rate Last " Admin    allopurinol (ZYLOPRIM) tablet 300 mg  300 mg Oral Daily Cindy Scott PA-C   300 mg at 09/06/24 0945    atorvastatin (LIPITOR) tablet 40 mg  40 mg Oral QPM Jose R Verma MD        bumetanide (BUMEX) tablet 2 mg  2 mg Oral BID Cindy Scott PA-C   2 mg at 09/06/24 0945    cetirizine (zyrTEC) tablet 10 mg  10 mg Oral QPM Cindy Scott PA-C   10 mg at 09/05/24 2011    [Held by provider] chlorthalidone (HYGROTON) tablet 25 mg  25 mg Oral Daily Cinyd Scott PA-C        finasteride (PROPECIA) tablet 1 mg  1 mg Oral Daily Cindy Scott PA-C   1 mg at 09/06/24 0945    [Held by provider] losartan (COZAAR) half-tab 12.5 mg  12.5 mg Oral Daily Cindy Scott PA-C        potassium chloride pushpa ER (KLOR-CON M20) CR tablet 40 mEq  40 mEq Oral BID Cindy Scott PA-C   40 mEq at 09/06/24 0945    rivaroxaban ANTICOAGULANT (XARELTO) tablet 20 mg  20 mg Oral Daily with supper Cindy Scott PA-C        sodium chloride (PF) 0.9% PF flush 3 mL  3 mL Intracatheter Q8H Cinyd Scott PA-C   3 mL at 09/05/24 2016    [Held by provider] sotalol (BETAPACE) tablet 80 mg  80 mg Oral Q12H Cindy Scott PA-C        [Held by provider] spironolactone (ALDACTONE) tablet 25 mg  25 mg Oral Daily Cindy Scott PA-C

## 2024-09-06 NOTE — PHARMACY
Pharmacy Consult to evaluate for medication related stroke core measures    Mike Schultz, 73 year old male admitted for stroke symptoms on 9/5/2024.    Thrombolytic was not given because of Time from onset contraindications    VTE Prophylaxis  Xarelto given on 9/5 as appropriate prior to end of hospital day 2.    Antithrombotic: rivaroxaban started on 9/5, as appropriate by end of hospital day 2. Continue antithrombotic therapy on discharge to meet quality measures, unless contraindicated.    Anticoagulation if history of A-fib/flutter: Patient on rivaroxaban (Xarelto); continue anticoagulation on discharge to meet quality measures, unless contraindicated.    LDL Cholesterol Calculated   Date Value Ref Range Status   09/05/2024 115 (H) <=100 mg/dL Final   06/30/2021 91 <100 mg/dL Final     Comment:     Desirable:       <100 mg/dl           Recommendations: None at this time    Thank you for the consult.    Kanwal Ocasio [facility] 9/6/2024 7:44 AM

## 2024-09-07 RX ORDER — SOTALOL HYDROCHLORIDE 80 MG/1
80 TABLET ORAL EVERY 12 HOURS
Qty: 60 TABLET | Refills: 0 | Status: SHIPPED | OUTPATIENT
Start: 2024-09-07 | End: 2024-09-10

## 2024-09-07 NOTE — PLAN OF CARE
[id number]:    Reason for Admission: Right frontal CVA    Cognitive/Mentation: A/Ox 4  Neuros/CMS: Intact ex slurred speech, left facial droop, and slight left tongue deviation. Edema in BLE unchanged; compression socks on. Denies numbness/tingling.  VS: VSS.   Tele: NSR.  /GI: WNL.Continent.   Pulmonary: LS clear. Dyspnea upon exertion.   Pain: Denies.     Drains/Lines: PIV SL at start of shift, removed prior to discharge.   Skin: Sam in color.  Activity: Up ad juan ramon  Diet: Regular with thin liquids. Takes pills whole with water, one at a time. 2L fluid restriction.     Therapies recs: Home  Discharge: [employer]    Aggression Stoplight Tool: Green    End of shift summary: CT cap resulted just prior to patients discharge. Dr. Cunningham plans to call patient to update on results. Patient given AVS and all questions answered. Pt was escorted off of unit via wheelchair. Wife to provide transport via private vehicle for home.

## 2024-09-07 NOTE — DISCHARGE INSTRUCTIONS
Your CT of chest, abdomen, and pelvis results are still pending. Dr. Cunningham has okay'd your discharge for today 9/6. Please expect a follow-up phone call tomorrow, September 7th regarding these results. If there is a concerning finding that needs to be dealt with right away, then we ask that you come back to the ED, but otherwise you can follow up in the clinic.

## 2024-09-07 NOTE — PLAN OF CARE
"Occupational Therapy Discharge Summary    Reason for therapy discharge:    Discharged to home with home therapy.    Progress towards therapy goal(s). See goals on Care Plan in [facility] electronic health record for goal details.  Goals not met.  Barriers to achieving goals:   discharge on same date as initial evaluation.    Therapy recommendation(s):    Continued therapy is recommended.  Rationale/Recommendations:  Pre evaluating therapist, \"Pt is currently functioning below baseline d/t decreased activity tolerance. Pt lives in house w/ spouse and reports being IND w/ all I/ADL's at baseline. Pt currently requiring 2 L of O2 at all times. Anticipate w/ continued IP therapy and addressing O2 needs, pt will be safe to d/c back to home w/ assist and home therapies.\".      "

## 2024-09-07 NOTE — PLAN OF CARE
"Speech Language Therapy Discharge Summary    Reason for therapy discharge:    Discharged to home with home therapy.    Progress towards therapy goal(s). See goals on Care Plan in [facility] electronic health record for goal details.  Goals partially met.  Barriers to achieving goals:   discharge from facility.    Therapy recommendation(s):    Continued therapy is recommended.  Rationale/Recommendations:  See below.    Clinical swallow evaluation 9/4/2024: \"Pt presents with mild to moderate oral dysphagia and intermittent throat clearing/cough with PO intake and unable to rule out aspiration. Pt alert, sitting EOB and agreeable to PO trials. Pt reported some drooling with intake at home, but no concern for aspiration. Oral motor exam significant for left sided weakness and reduced sensation. Pt self fed, with some left UE weakness/uncoordination, thin liquids, puree solids and regular textures. Mild anterior spillage of thin water via cup. Trialed straw, however, suspect premature spillage with immediate cough. No overt s/sx of aspiration of thin liquids via cup in isolation or after solids. Mild pocketing wtih pureed foods that increased to mild to moderate with solids and cues required to clear left buccal cavity. Education provided and strated training oral clearance and swallow strategies. Pt verbalized agreement with recommended: easy to chew diet with thin liquids (no straws) with direct supervision/assist and need to take small single sips and bites, one at a time, alternate solids and liquids, check left side for pocketing and complete oral cares after meals. If coughing, consider downgrade to mildly thick liquids.\"    Recommend easy to chew diet with thin liquids straws ok with small single sips and bites, one at a time, alternate solids and liquids, check left side for pocketing and complete oral cares after meals.    Patient may benefit from instrumental swallow evaluation pending progress.                      "

## 2024-09-08 ENCOUNTER — PATIENT OUTREACH (OUTPATIENT)
Dept: CARE COORDINATION | Facility: CLINIC | Age: 74
End: 2024-09-08
Payer: COMMERCIAL

## 2024-09-08 NOTE — PROGRESS NOTES
[facility]:   [facility] Contact  [facility]/Voicemail     Clinical Data: Post-Discharge Outreach     Outreach attempted x 2.  Left message on patient's voicemail, providing [facility]'s central phone number of [phone] ([phone]) for questions/concerns and/or to schedule an appt with an [facility] provider, if they do not have a PCP.      Plan:  [facility] will do no further outreaches at this time.       Poppy Chavez  Community Health Worker  [facility], [facility]  [phone]      *Connected Care Resource Team does NOT follow patient ongoing. Referrals are identified based on internal discharge reports and the outreach is to ensure patient has an understanding of their discharge instructions.

## 2024-09-09 DIAGNOSIS — M10.9 GOUT OF FOOT, UNSPECIFIED CAUSE, UNSPECIFIED CHRONICITY, UNSPECIFIED LATERALITY: ICD-10-CM

## 2024-09-09 RX ORDER — ALLOPURINOL 300 MG/1
TABLET ORAL
Qty: 90 TABLET | Refills: 0 | Status: SHIPPED | OUTPATIENT
Start: 2024-09-09

## 2024-09-10 ENCOUNTER — OFFICE VISIT (OUTPATIENT)
Dept: CARDIOLOGY | Facility: CLINIC | Age: 74
End: 2024-09-10
Payer: COMMERCIAL

## 2024-09-10 ENCOUNTER — MEDICAL CORRESPONDENCE (OUTPATIENT)
Dept: HEALTH INFORMATION MANAGEMENT | Facility: CLINIC | Age: 74
End: 2024-09-10

## 2024-09-10 VITALS
BODY MASS INDEX: 44.66 KG/M2 | DIASTOLIC BLOOD PRESSURE: 78 MMHG | OXYGEN SATURATION: 94 % | SYSTOLIC BLOOD PRESSURE: 127 MMHG | WEIGHT: 294.7 LBS | HEART RATE: 102 BPM | HEIGHT: 68 IN

## 2024-09-10 DIAGNOSIS — I48.19 PERSISTENT ATRIAL FIBRILLATION (H): ICD-10-CM

## 2024-09-10 PROCEDURE — 99214 OFFICE O/P EST MOD 30 MIN: CPT | Performed by: INTERNAL MEDICINE

## 2024-09-10 NOTE — PROGRESS NOTES
Electrophysiology Clinic Progress Note    Mike Schultz MRN# [id number]   YOB: 1950 Age: 73 year old     Primary cardiologist:          Assessment and Plan   Delightful pt with symptomatic pAF over the past 10 yrs, s/p PVI in 2017 with redo ablation in 2019 for reconnected PVs. AF recurred again later on requiring multiple cardioversions. He had another ablation this past May with re-isolation of reconnected pvs and LA posterior wall isolation. Pt was on amio in the past but had to be stopped due to pneumonitis. Recent prolonged hosp for chf decompensation. In nsr throughout on low dose sotalol.     Several days ago noted facial droopiness along with dysarthria due to possible ischemia in the posterior right frontal lobe corresponding to a distal right M3/M4 vessel. TPA not given as he has been on Xarelto which he denies missing any dose. He still has a facial droop and some difficulties in pronunciation. Known aortic stenosis. Has tavr clinic appt soon.  Sent home with xarelto. Sotalol was stopped due to  ms, longer than before.     Agree with stopping sotalol. If AF/AFl recur may consider AVN ablation and ppm. As pt technically failed Xarelto with recent CVA it is not reasonable to consider Watchman as an adjunct therapy. Will let structural team to discuss this option with the upcoming appt for TAVR. In the meantime I asked the pt to take a baby aspirin.              Review of Systems     12-pt ROS is negative except for as noted in the HPI.          Physical Exam     Vitals: There were no vitals taken for this visit.  Wt Readings from Last 10 Encounters:   09/06/24 133.1 kg (293 lb 8 oz)   08/28/24 130.5 kg (287 lb 12.8 oz)   08/15/24 133.5 kg (294 lb 6.4 oz)   08/09/24 133.8 kg (294 lb 14.4 oz)   07/25/24 142 kg (313 lb 1.6 oz)   07/24/24 140.9 kg (310 lb 10.1 oz)   05/24/24 127.5 kg (281 lb)   05/22/24 125.2 kg (276 lb)   05/21/24 124.1 kg (273 lb 8 oz)   05/16/24 126.1 kg (278 lb  1.6 oz)       Constitutional:  Patient is pleasant, alert, cooperative, and in NAD.  HEENT:  NCAT. PERRLA. EOM's intact.   Neck:  CVP appears normal. No carotid bruits.   Pulmonary: Normal respiratory effort. CTAB.   Cardiac: RRR, normal S1/S2, no S3/S4, no murmur or rub.   Abdomen:  Non-tender abdomen, no hepatosplenomegaly appreciated.   Vascular: Pulses in the upper and lower extremities are 2+ and equal bilaterally.  Extremities: No edema, erythema, cyanosis or tenderness appreciated.  Skin:  No rashes or lesions appreciated.   Neurological:  No gross motor or sensory deficits.   Psych: Appropriate affect.          Data   Labs reviewed:  Recent Labs   Lab Test 09/05/24  1138 09/04/24  1021 08/15/24  1433 11/24/23  1215 07/27/23  0955 01/17/23  1234 10/18/22  0848 10/06/21  1759   *  --   --   --  97  --   --  103*   HDL 35*  --   --   --  39*  --   --  38*   NHDL 152*  --   --   --  124  --   --  135*   CHOL 187  --   --   --  163  --   --  173   TRIG 187*  --   --   --  133  --   --  161*   TSH  --   --   --  2.67  --  2.53 4.33*  --    NTBNP  --  654 374 274  --   --   --   --        Lab Results   Component Value Date    WBC 7.9 09/06/2024    WBC 9.8 09/21/2020    RBC 4.31 (L) 09/06/2024    RBC 4.47 09/21/2020    HGB 13.9 09/06/2024    HGB 14.4 09/21/2020    HCT 43.0 09/06/2024    HCT 44.7 09/21/2020     09/06/2024     09/21/2020    MCH 32.3 09/06/2024    MCH 32.2 09/21/2020    MCHC 32.3 09/06/2024    MCHC 32.2 09/21/2020    RDW 15.3 (H) 09/06/2024    RDW 13.3 09/21/2020     09/06/2024     09/21/2020       Lab Results   Component Value Date     09/06/2024     06/30/2021    POTASSIUM 3.6 09/06/2024    POTASSIUM 4.6 12/17/2022    POTASSIUM 3.6 06/30/2021    CHLORIDE 103 09/06/2024    CHLORIDE 106 12/17/2022    CHLORIDE 109 06/30/2021    CO2 27 09/06/2024    CO2 30 12/17/2022    CO2 27 06/30/2021    ANIONGAP 11 09/06/2024    ANIONGAP 4 12/17/2022    ANIONGAP 5  06/30/2021     (H) 09/06/2024     (H) 09/06/2024     (H) 12/17/2022    GLC 89 06/30/2021    BUN 17.5 09/06/2024    BUN 15 12/17/2022    BUN 13 06/30/2021    CR 1.01 09/06/2024    CR 1.06 06/30/2021    GFRESTIMATED 79 09/06/2024    GFRESTIMATED 71 06/30/2021    GFRESTBLACK 82 06/30/2021    ASHELY 9.4 09/06/2024    ASHELY 8.7 06/30/2021      Lab Results   Component Value Date    AST 34 05/05/2024    AST 24 06/30/2021    ALT 40 05/05/2024    ALT 42 06/30/2021       Lab Results   Component Value Date    A1C 6.5 (H) 09/05/2024    A1C 5.7 (H) 02/25/2019       Lab Results   Component Value Date    INR 1.97 (H) 09/05/2024    INR 2.47 (H) 08/01/2024    INR 2.04 (H) 02/26/2019    INR 1.66 (H) 11/06/2017            Problem List     Patient Active Problem List   Diagnosis    Essential hypertension    Impotence of organic origin    Allergic rhinitis    Herpes simplex virus (HSV) infection    Class 3 obesity (H)    Tobacco use disorder, moderate, in sustained remission    Atrial fibrillation, permanent (H)    Family history of ischemic heart disease    Gout    BOWEN (obstructive sleep apnea)    Ascending aorta dilatation (H24)    Impaired fasting glucose    Aortic valve stenosis    Venous insufficiency    Bilateral carpal tunnel syndrome    Other cardiomyopathies (H)    Leg wound, right    Lower extremity edema    Acute respiratory failure with hypoxia (H)    Acute on chronic congestive heart failure, unspecified heart failure type (H)    Acute decompensated heart failure (H)    Facial droop    Dysarthria    Acute CVA (cerebrovascular accident) (H)            Medications     Current Outpatient Medications   Medication Sig Dispense Refill    allopurinol (ZYLOPRIM) 300 MG tablet TAKE ONE TABLET (300 MG) BY MOUTH ONE TIME DAILY 90 tablet 0    atorvastatin (LIPITOR) 40 MG tablet Take 1 tablet (40 mg) by mouth every evening. 60 tablet 3    bumetanide (BUMEX) 1 MG tablet 2 mg am and 2 mg pm (Patient taking differently:  Take 2 mg by mouth 2 times daily.) 360 tablet 1    cetirizine (ZYRTEC) 10 MG tablet Take 10 mg by mouth every evening      finasteride (PROPECIA) 1 MG tablet Take 1 tablet (1 mg) by mouth daily 90 tablet 2    losartan (COZAAR) 25 MG tablet Take 0.5 tablets (12.5 mg) by mouth daily.      miconazole (MICATIN) 2 % external powder Apply topically 2 times daily (Patient not taking: Reported on 8/28/2024) 85 g 0    potassium chloride pushpa ER (KLOR-CON M20) 20 MEQ CR tablet Take 2 tablets (40 mEq) by mouth 2 times daily 120 tablet 0    psyllium (METAMUCIL/KONSYL) capsule Take 1-2 capsules by mouth daily      rivaroxaban ANTICOAGULANT (XARELTO ANTICOAGULANT) 20 MG TABS tablet Take 1 tablet (20 mg) by mouth daily (with dinner) 90 tablet 3    sotalol (BETAPACE) 80 MG tablet Take 1 tablet (80 mg) by mouth every 12 hours. 60 tablet 0            Past Medical History     Past Medical History:   Diagnosis Date    (HFpEF) heart failure with preserved ejection fraction (H)     Acute gouty arthritis     Alcohol use disorder in remission     Amiodarone pulmonary toxicity     Aortic stenosis     Basal cell carcinoma     Of the nose    Bell's palsy     Herpes simplex without mention of complication     Hyperlipidemia     Hypertension 2000    Persistent atrial fibrillation (H)     persistent, DCCV 12/2017, ablation 11/6/17    Pulmonary nodules     Sleep apnea     CPAP     Past Surgical History:   Procedure Laterality Date    ABLATION OF DYSRHYTHMIC FOCUS  2018, 04/12/2019    Procedure: EP Ablation Focal AFIB;  Surgeon: Fady Day MD;  Location:  [facility]    ACHILLES TENDON SURGERY  1997    ANESTHESIA CARDIOVERSION N/A 2/26/2019    Procedure: ANESTHESIA CARDIOVERSION (CONNER);  Surgeon: GENERIC ANESTHESIA PROVIDER;  Location:  OR    ANESTHESIA CARDIOVERSION N/A 11/19/2020    Procedure: ANESTHESIA, FOR CARDIOVERSION (dr campbell);  Surgeon: GENERIC ANESTHESIA PROVIDER;  Location:  OR    ANESTHESIA CARDIOVERSION N/A  8/22/2022    Procedure: CARDIOVERSION;  Surgeon: GENERIC ANESTHESIA PROVIDER;  Location:  OR    ANESTHESIA CARDIOVERSION N/A 9/13/2023    Procedure: Anesthesia cardioversion;  Surgeon: GENERIC ANESTHESIA PROVIDER;  Location:  OR    ANESTHESIA CARDIOVERSION N/A 10/6/2023    Procedure: Anesthesia cardioversion;  Surgeon: GENERIC ANESTHESIA PROVIDER;  Location:  OR    ANESTHESIA CARDIOVERSION N/A 8/1/2024    Procedure: Anesthesia cardioversion;  Surgeon: GENERIC ANESTHESIA PROVIDER;  Location:  OR    BASAL CELL CARCINOMA EXCISION Right 2009    nose, took cartilage from inner ear.    CARDIOVERSION  2012, 2019    cardioversion for atr fib    COLONOSCOPY  2008    COLONOSCOPY N/A 3/22/2023    Procedure: COLONOSCOPY, FLEXIBLE, WITH LESION REMOVAL USING SNARE;  Surgeon: Anne Lucero MD;  Location:  GI    CV RIGHT HEART CATH MEASUREMENTS RECORDED N/A 7/31/2024    Procedure: Right Heart Cath;  Surgeon: Adriano Lazo MD;  Location:  [facility]    EP ABLATION FOCAL AFIB N/A 4/12/2019    Procedure: EP Ablation Focal AFIB;  Surgeon: Fady Day MD;  Location:  [facility]    EP ABLATION FOCAL AFIB N/A 5/2/2024    Procedure: Ablation Atrial Fibrilation;  Surgeon: Fady Day MD;  Location:  [facility]    HERNIA REPAIR, UMBILICAL  08/20/2012    Procedure: HERNIORRHAPHY UMBILICAL;  UMBILICAL HERNIA REPAIR;  Surgeon: aR Crocker MD;  Location: [facility]    HERNIORRHAPHY UMBILICAL  8/20/2012    Procedure: HERNIORRHAPHY UMBILICAL;  UMBILICAL HERNIA REPAIR;  Surgeon: Ra Crocker MD;  Location: [facility]    ORTHOPEDIC SURGERY      ORTHOPEDIC SURGERY      OTHER SURGICAL HISTORY Right 03/2017    total KNEE ARTHROSCOPY     TOTAL KNEE ARTHROPLASTY Left 03/16/2018    ZZC NONSPECIFIC PROCEDURE  1997    achilles tendon    ZZC NONSPECIFIC PROCEDURE      knees, arthroscopy    ZZC NONSPECIFIC PROCEDURE      basal cell skin ca, nose    ZZC NONSPECIFIC PROCEDURE      flex  sig; colonoscopy due 3/2008    ZZC NONSPECIFIC PROCEDURE      cardioversion for atr fib    ZZC TOTAL KNEE ARTHROPLASTY Left 2018    Dr. Malick Suarez     Family History   Problem Relation Age of Onset    Family History Negative Mother     Heart Disease Father         decesased at 42 - MI    Heart Disease Sister          MI    Lipids Sister     Lipids Sister     No Known Problems Sister     Heart Disease Brother          MI    Heart Disease Brother         CABG AT 49    Lung Cancer No family hx of      Social History     Socioeconomic History    Marital status: Single     Spouse name: Not on file    Number of children: Not on file    Years of education: Not on file    Highest education level: Not on file   Occupational History    Not on file   Tobacco Use    Smoking status: Former     Current packs/day: 0.00     Average packs/day: 1 pack/day for 34.6 years (34.6 ttl pk-yrs)     Types: Cigarettes     Start date:      Quit date: 2011     Years since quittin.0     Passive exposure: Past    Smokeless tobacco: Never    Tobacco comments:     quit    Vaping Use    Vaping status: Never Used   Substance and Sexual Activity    Alcohol use: Not Currently    Drug use: Never    Sexual activity: Not Currently     Partners: Female     Birth control/protection: None   Other Topics Concern    Parent/sibling w/ CABG, MI or angioplasty before 65F 55M? Not Asked   Social History Narrative    Worked in retail (-) and insurance sales     Spends some time on [place] on [place]     Social Determinants of Health     Financial Resource Strain: Not on file   Food Insecurity: Not on file   Transportation Needs: Not on file   Physical Activity: Not on file   Stress: Not on file   Social Connections: Not on file   Interpersonal Safety: Not on file   Housing Stability: Not on file            Allergies   Amiodarone    Today's clinic visit entailed:  The following tests were independently  interpreted by me as noted in my documentation: ecg, head ct  30 minutes spent by me on the date of the encounter doing chart review, history and exam, documentation and further activities per the note  Provider  Link to MDM Help Grid     The level of medical decision making during this visit was of moderate complexity.

## 2024-09-10 NOTE — LETTER
9/10/2024    Ranjan Courtney MD  [address] [facility] [address]    RE: Mike Schultz       Dear Colleague,     I had the pleasure of seeing Mike Schultz in the [facility].    Electrophysiology Clinic Progress Note    Mike Schultz MRN# [id number]   YOB: 1950 Age: 73 year old     Primary cardiologist:          Assessment and Plan   Delightful pt with symptomatic pAF over the past 10 yrs, s/p PVI in 2017 with redo ablation in 2019 for reconnected PVs. AF recurred again later on requiring multiple cardioversions. He had another ablation this past May with re-isolation of reconnected pvs and LA posterior wall isolation. Pt was on amio in the past but had to be stopped due to pneumonitis. Recent prolonged hosp for chf decompensation. In nsr throughout on low dose sotalol.     Several days ago noted facial droopiness along with dysarthria due to possible ischemia in the posterior right frontal lobe corresponding to a distal right M3/M4 vessel. TPA not given as he has been on Xarelto which he denies missing any dose. He still has a facial droop and some difficulties in pronunciation. Known aortic stenosis. Has tavr clinic appt soon.  Sent home with xarelto. Sotalol was stopped due to  ms, longer than before.     Agree with stopping sotalol. If AF/AFl recur may consider AVN ablation and ppm. As pt technically failed Xarelto with recent CVA it is not reasonable to consider Watchman as an adjunct therapy. Will let structural team to discuss this option with the upcoming appt for TAVR. In the meantime I asked the pt to take a baby aspirin.              Review of Systems     12-pt ROS is negative except for as noted in the HPI.          Physical Exam     Vitals: There were no vitals taken for this visit.  Wt Readings from Last 10 Encounters:   09/06/24 133.1 kg (293 lb 8 oz)   08/28/24 130.5 kg (287 lb 12.8 oz)   08/15/24 133.5 kg (294 lb 6.4 oz)   08/09/24 133.8 kg (294 lb  14.4 oz)   07/25/24 142 kg (313 lb 1.6 oz)   07/24/24 140.9 kg (310 lb 10.1 oz)   05/24/24 127.5 kg (281 lb)   05/22/24 125.2 kg (276 lb)   05/21/24 124.1 kg (273 lb 8 oz)   05/16/24 126.1 kg (278 lb 1.6 oz)       Constitutional:  Patient is pleasant, alert, cooperative, and in NAD.  HEENT:  NCAT. PERRLA. EOM's intact.   Neck:  CVP appears normal. No carotid bruits.   Pulmonary: Normal respiratory effort. CTAB.   Cardiac: RRR, normal S1/S2, no S3/S4, no murmur or rub.   Abdomen:  Non-tender abdomen, no hepatosplenomegaly appreciated.   Vascular: Pulses in the upper and lower extremities are 2+ and equal bilaterally.  Extremities: No edema, erythema, cyanosis or tenderness appreciated.  Skin:  No rashes or lesions appreciated.   Neurological:  No gross motor or sensory deficits.   Psych: Appropriate affect.          Data   Labs reviewed:  Recent Labs   Lab Test 09/05/24  1138 09/04/24  1021 08/15/24  1433 11/24/23  1215 07/27/23  0955 01/17/23  1234 10/18/22  0848 10/06/21  1759   *  --   --   --  97  --   --  103*   HDL 35*  --   --   --  39*  --   --  38*   NHDL 152*  --   --   --  124  --   --  135*   CHOL 187  --   --   --  163  --   --  173   TRIG 187*  --   --   --  133  --   --  161*   TSH  --   --   --  2.67  --  2.53 4.33*  --    NTBNP  --  654 374 274  --   --   --   --        Lab Results   Component Value Date    WBC 7.9 09/06/2024    WBC 9.8 09/21/2020    RBC 4.31 (L) 09/06/2024    RBC 4.47 09/21/2020    HGB 13.9 09/06/2024    HGB 14.4 09/21/2020    HCT 43.0 09/06/2024    HCT 44.7 09/21/2020     09/06/2024     09/21/2020    MCH 32.3 09/06/2024    MCH 32.2 09/21/2020    MCHC 32.3 09/06/2024    MCHC 32.2 09/21/2020    RDW 15.3 (H) 09/06/2024    RDW 13.3 09/21/2020     09/06/2024     09/21/2020       Lab Results   Component Value Date     09/06/2024     06/30/2021    POTASSIUM 3.6 09/06/2024    POTASSIUM 4.6 12/17/2022    POTASSIUM 3.6 06/30/2021    CHLORIDE 103  09/06/2024    CHLORIDE 106 12/17/2022    CHLORIDE 109 06/30/2021    CO2 27 09/06/2024    CO2 30 12/17/2022    CO2 27 06/30/2021    ANIONGAP 11 09/06/2024    ANIONGAP 4 12/17/2022    ANIONGAP 5 06/30/2021     (H) 09/06/2024     (H) 09/06/2024     (H) 12/17/2022    GLC 89 06/30/2021    BUN 17.5 09/06/2024    BUN 15 12/17/2022    BUN 13 06/30/2021    CR 1.01 09/06/2024    CR 1.06 06/30/2021    GFRESTIMATED 79 09/06/2024    GFRESTIMATED 71 06/30/2021    GFRESTBLACK 82 06/30/2021    ASHELY 9.4 09/06/2024    ASHELY 8.7 06/30/2021      Lab Results   Component Value Date    AST 34 05/05/2024    AST 24 06/30/2021    ALT 40 05/05/2024    ALT 42 06/30/2021       Lab Results   Component Value Date    A1C 6.5 (H) 09/05/2024    A1C 5.7 (H) 02/25/2019       Lab Results   Component Value Date    INR 1.97 (H) 09/05/2024    INR 2.47 (H) 08/01/2024    INR 2.04 (H) 02/26/2019    INR 1.66 (H) 11/06/2017            Problem List     Patient Active Problem List   Diagnosis     Essential hypertension     Impotence of organic origin     Allergic rhinitis     Herpes simplex virus (HSV) infection     Class 3 obesity (H)     Tobacco use disorder, moderate, in sustained remission     Atrial fibrillation, permanent (H)     Family history of ischemic heart disease     Gout     BOWEN (obstructive sleep apnea)     Ascending aorta dilatation (H24)     Impaired fasting glucose     Aortic valve stenosis     Venous insufficiency     Bilateral carpal tunnel syndrome     Other cardiomyopathies (H)     Leg wound, right     Lower extremity edema     Acute respiratory failure with hypoxia (H)     Acute on chronic congestive heart failure, unspecified heart failure type (H)     Acute decompensated heart failure (H)     Facial droop     Dysarthria     Acute CVA (cerebrovascular accident) (H)            Medications     Current Outpatient Medications   Medication Sig Dispense Refill     allopurinol (ZYLOPRIM) 300 MG tablet TAKE ONE TABLET (300 MG)  BY MOUTH ONE TIME DAILY 90 tablet 0     atorvastatin (LIPITOR) 40 MG tablet Take 1 tablet (40 mg) by mouth every evening. 60 tablet 3     bumetanide (BUMEX) 1 MG tablet 2 mg am and 2 mg pm (Patient taking differently: Take 2 mg by mouth 2 times daily.) 360 tablet 1     cetirizine (ZYRTEC) 10 MG tablet Take 10 mg by mouth every evening       finasteride (PROPECIA) 1 MG tablet Take 1 tablet (1 mg) by mouth daily 90 tablet 2     losartan (COZAAR) 25 MG tablet Take 0.5 tablets (12.5 mg) by mouth daily.       miconazole (MICATIN) 2 % external powder Apply topically 2 times daily (Patient not taking: Reported on 8/28/2024) 85 g 0     potassium chloride pushpa ER (KLOR-CON M20) 20 MEQ CR tablet Take 2 tablets (40 mEq) by mouth 2 times daily 120 tablet 0     psyllium (METAMUCIL/KONSYL) capsule Take 1-2 capsules by mouth daily       rivaroxaban ANTICOAGULANT (XARELTO ANTICOAGULANT) 20 MG TABS tablet Take 1 tablet (20 mg) by mouth daily (with dinner) 90 tablet 3     sotalol (BETAPACE) 80 MG tablet Take 1 tablet (80 mg) by mouth every 12 hours. 60 tablet 0            Past Medical History     Past Medical History:   Diagnosis Date     (HFpEF) heart failure with preserved ejection fraction (H)      Acute gouty arthritis      Alcohol use disorder in remission      Amiodarone pulmonary toxicity      Aortic stenosis      Basal cell carcinoma     Of the nose     Bell's palsy      Herpes simplex without mention of complication      Hyperlipidemia      Hypertension 2000     Persistent atrial fibrillation (H)     persistent, DCCV 12/2017, ablation 11/6/17     Pulmonary nodules      Sleep apnea     CPAP     Past Surgical History:   Procedure Laterality Date     ABLATION OF DYSRHYTHMIC FOCUS  2018, 04/12/2019    Procedure: EP Ablation Focal AFIB;  Surgeon: Fady Day MD;  Location:  HEART CARDIAC CATH LAB     ACHILLES TENDON SURGERY  1997     ANESTHESIA CARDIOVERSION N/A 2/26/2019    Procedure: ANESTHESIA CARDIOVERSION (CONNER);   Surgeon: GENERIC ANESTHESIA PROVIDER;  Location:  OR     ANESTHESIA CARDIOVERSION N/A 11/19/2020    Procedure: ANESTHESIA, FOR CARDIOVERSION (dr campbell);  Surgeon: GENERIC ANESTHESIA PROVIDER;  Location:  OR     ANESTHESIA CARDIOVERSION N/A 8/22/2022    Procedure: CARDIOVERSION;  Surgeon: GENERIC ANESTHESIA PROVIDER;  Location:  OR     ANESTHESIA CARDIOVERSION N/A 9/13/2023    Procedure: Anesthesia cardioversion;  Surgeon: GENERIC ANESTHESIA PROVIDER;  Location:  OR     ANESTHESIA CARDIOVERSION N/A 10/6/2023    Procedure: Anesthesia cardioversion;  Surgeon: GENERIC ANESTHESIA PROVIDER;  Location:  OR     ANESTHESIA CARDIOVERSION N/A 8/1/2024    Procedure: Anesthesia cardioversion;  Surgeon: GENERIC ANESTHESIA PROVIDER;  Location:  OR     BASAL CELL CARCINOMA EXCISION Right 2009    nose, took cartilage from inner ear.     CARDIOVERSION  2012, 2019    cardioversion for atr fib     COLONOSCOPY  2008     COLONOSCOPY N/A 3/22/2023    Procedure: COLONOSCOPY, FLEXIBLE, WITH LESION REMOVAL USING SNARE;  Surgeon: Anne Lucero MD;  Location:  GI     CV RIGHT HEART CATH MEASUREMENTS RECORDED N/A 7/31/2024    Procedure: Right Heart Cath;  Surgeon: Adriano Lazo MD;  Location:  HEART CARDIAC CATH LAB     EP ABLATION FOCAL AFIB N/A 4/12/2019    Procedure: EP Ablation Focal AFIB;  Surgeon: Fady Day MD;  Location:  HEART CARDIAC CATH LAB     EP ABLATION FOCAL AFIB N/A 5/2/2024    Procedure: Ablation Atrial Fibrilation;  Surgeon: Fady Day MD;  Location:  HEART CARDIAC CATH LAB     HERNIA REPAIR, UMBILICAL  08/20/2012    Procedure: HERNIORRHAPHY UMBILICAL;  UMBILICAL HERNIA REPAIR;  Surgeon: Ra Crocker MD;  Location: [facility]     HERNIORRHAPHY UMBILICAL  8/20/2012    Procedure: HERNIORRHAPHY UMBILICAL;  UMBILICAL HERNIA REPAIR;  Surgeon: Ra Crocker MD;  Location: [facility]     ORTHOPEDIC SURGERY       ORTHOPEDIC SURGERY       OTHER SURGICAL HISTORY Right 03/2017    total KNEE  ARTHROSCOPY      TOTAL KNEE ARTHROPLASTY Left 2018     [facility] NONSPECIFIC PROCEDURE      achilles tendon     Z NONSPECIFIC PROCEDURE      knees, arthroscopy     ZZC NONSPECIFIC PROCEDURE      basal cell skin ca, nose     Z NONSPECIFIC PROCEDURE      flex sig; colonoscopy due 3/2008     Z NONSPECIFIC PROCEDURE      cardioversion for atr fib     ZZC TOTAL KNEE ARTHROPLASTY Left 2018    Dr. Malick Suarez     Family History   Problem Relation Age of Onset     Family History Negative Mother      Heart Disease Father         decesased at 42 - MI     Heart Disease Sister          MI     Lipids Sister      Lipids Sister      No Known Problems Sister      Heart Disease Brother          MI     Heart Disease Brother         CABG AT 49     Lung Cancer No family hx of      Social History     Socioeconomic History     Marital status: Single     Spouse name: Not on file     Number of children: Not on file     Years of education: Not on file     Highest education level: Not on file   Occupational History     Not on file   Tobacco Use     Smoking status: Former     Current packs/day: 0.00     Average packs/day: 1 pack/day for 34.6 years (34.6 ttl pk-yrs)     Types: Cigarettes     Start date:      Quit date: 2011     Years since quittin.0     Passive exposure: Past     Smokeless tobacco: Never     Tobacco comments:     quit 2011   Vaping Use     Vaping status: Never Used   Substance and Sexual Activity     Alcohol use: Not Currently     Drug use: Never     Sexual activity: Not Currently     Partners: Female     Birth control/protection: None   Other Topics Concern     Parent/sibling w/ CABG, MI or angioplasty before 65F 55M? Not Asked   Social History Narrative    Worked in retail () and insurance sales     Spends some time on [place] on [place]     Social Determinants of Health     Financial Resource Strain: Not on file   Food Insecurity: Not on file   Transportation  Needs: Not on file   Physical Activity: Not on file   Stress: Not on file   Social Connections: Not on file   Interpersonal Safety: Not on file   Housing Stability: Not on file            Allergies   Amiodarone    Today's clinic visit entailed:  The following tests were independently interpreted by me as noted in my documentation: ecg, head ct  30 minutes spent by me on the date of the encounter doing chart review, history and exam, documentation and further activities per the note  Provider  Link to [facility] Help Grid     The level of medical decision making during this visit was of moderate complexity.       Thank you for allowing me to participate in the care of your patient.      Sincerely,     Fady Kent MD     [facility]  cc:   Natalie Campos PA-C  [address]  [place] [address]

## 2024-09-12 ENCOUNTER — OFFICE VISIT (OUTPATIENT)
Dept: CARDIOLOGY | Facility: CLINIC | Age: 74
End: 2024-09-12
Payer: COMMERCIAL

## 2024-09-12 VITALS
BODY MASS INDEX: 44.65 KG/M2 | OXYGEN SATURATION: 95 % | DIASTOLIC BLOOD PRESSURE: 84 MMHG | SYSTOLIC BLOOD PRESSURE: 120 MMHG | HEART RATE: 93 BPM | WEIGHT: 294.6 LBS | HEIGHT: 68 IN

## 2024-09-12 DIAGNOSIS — I50.33 ACUTE ON CHRONIC DIASTOLIC CONGESTIVE HEART FAILURE (H): ICD-10-CM

## 2024-09-12 DIAGNOSIS — I35.0 AORTIC VALVE STENOSIS, ETIOLOGY OF CARDIAC VALVE DISEASE UNSPECIFIED: ICD-10-CM

## 2024-09-12 DIAGNOSIS — I48.19 PERSISTENT ATRIAL FIBRILLATION (H): Primary | ICD-10-CM

## 2024-09-12 PROBLEM — E11.9 DIABETES MELLITUS, TYPE 2 (H): Status: ACTIVE | Noted: 2024-09-12

## 2024-09-12 PROCEDURE — G2211 COMPLEX E/M VISIT ADD ON: HCPCS | Performed by: INTERNAL MEDICINE

## 2024-09-12 PROCEDURE — 99205 OFFICE O/P NEW HI 60 MIN: CPT | Performed by: INTERNAL MEDICINE

## 2024-09-12 NOTE — PROGRESS NOTES
"HPI and Plan:   Brayden is here for longitudinal care and management of @ aortic stenosis.  Followed by Shanthi Wilkins and Dr. Day for persistent atrial fibrillation, CHF, now referred to structural clinic for consideration for AVR.    Patient saw Dr. Day 9/10 and noted that the patient had a recent episdoe of facial droopiness and dysarthria.  Has not missed a dose of Xarelto.  MRI brain showing subacute/acute infarct right frontal lobe.  Would recommend referral to neurology/PMD for discussion of etiology of stroke and further evaluation.     Patient has no chest pain chest pressure.  He does have some shortness of breath.  This is multifactorial.  I reviewed looked at his echocardiogram specifically looks to me like moderate to severe aortic valve stenosis but image quality is poor.  His ejection fraction is preserved.  I think to help with further try and differentiate whether or not this might be severe aortic stenosis I think a TAVR CT scan is indicated.  Will look at the calcium score on his valve.  My general impression is that we wait on the valve replacement.  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  Perhaps we might need a right and left heart catheterization and valve area determination by intracardiac catheterization.    Regarding his \"\" failed Xarelto.  The CT scan states that the territory and artery responsible M3 M4 for the stroke was not seen on the CT scan.  Therefore disease in that vessel dissection thrombosis etc. cannot be ruled out would be highly unusual for patient to have stroke secondary to atrial fibrillation while on full dose high dose Xarelto and has not missed a dose.  The entertainment of a left atrial appendage occlusion for \"\" failed Xarelto is an interesting a concept however certainly we would need neurology to confirm that they believe that is the case and would need a CT scan or transesophageal echocardiogram to rule out thrombosis or assess for " thrombosis of the left atrial appendage.  Multiple factors at play here patient needs neurology consultation, neuro rehab, pulmonary medicine to weigh in on the contribution of amiodarone toxicity due to and lung disease due to his shortness of breath and dyspnea cardia TAVR CT scan and possible left and right heart catheterization.      Patient had some degree of lung injury secondary to amiodarone toxicity.  Breathing is improving.  3 ablations for afib and multiple cardioversions.         Echocardiogram showing mean aortic gradient 27, DI .26, SVI low at 29, MARILYN   Echocardiogram reviewed appears moderate-severe range at this time.     Patient is not interested in open AVR.          The longitudinal plan of care for the diagnosis(es)/condition(s) as documented were addressed during this visit. Due to the added complexity in care, I will continue to support Brayden in the subsequent management and with ongoing continuity of care.     Today's clinic visit entailed:  Review of the result(s) of each unique test - Labs, echo, CT, MRI  Assessment requiring an independent historian(s) - significant other - SO  The following tests were independently interpreted by me as noted in my documentation: echo  Ordering of each unique test  60 minutes spent by me on the date of the encounter doing chart review, history and exam, documentation and further activities per the note  Provider  Link to MDM Help Grid     The level of medical decision making during this visit was of high complexity.      No orders of the defined types were placed in this encounter.    No orders of the defined types were placed in this encounter.    There are no discontinued medications.      No diagnosis found.    CURRENT MEDICATIONS:  Current Outpatient Medications   Medication Sig Dispense Refill    allopurinol (ZYLOPRIM) 300 MG tablet TAKE ONE TABLET (300 MG) BY MOUTH ONE TIME DAILY 90 tablet 0    atorvastatin (LIPITOR) 40 MG tablet Take 1 tablet (40  mg) by mouth every evening. 60 tablet 3    bumetanide (BUMEX) 1 MG tablet 2 mg am and 2 mg pm (Patient taking differently: Take 2 mg by mouth 2 times daily.) 360 tablet 1    cetirizine (ZYRTEC) 10 MG tablet Take 10 mg by mouth every evening      finasteride (PROPECIA) 1 MG tablet Take 1 tablet (1 mg) by mouth daily 90 tablet 2    losartan (COZAAR) 25 MG tablet Take 0.5 tablets (12.5 mg) by mouth daily.      miconazole (MICATIN) 2 % external powder Apply topically 2 times daily 85 g 0    potassium chloride pushpa ER (KLOR-CON M20) 20 MEQ CR tablet Take 2 tablets (40 mEq) by mouth 2 times daily 120 tablet 0    psyllium (METAMUCIL/KONSYL) capsule Take 1-2 capsules by mouth daily      rivaroxaban ANTICOAGULANT (XARELTO ANTICOAGULANT) 20 MG TABS tablet Take 1 tablet (20 mg) by mouth daily (with dinner) 90 tablet 3       ALLERGIES     Allergies   Allergen Reactions    Amiodarone Shortness Of Breath     Suspected amiodarone toxicity involving lungs       PAST MEDICAL HISTORY:  Past Medical History:   Diagnosis Date    (HFpEF) heart failure with preserved ejection fraction (H)     Acute gouty arthritis     Alcohol use disorder in remission     Amiodarone pulmonary toxicity     Aortic stenosis     Basal cell carcinoma     Of the nose    Bell's palsy     Herpes simplex without mention of complication     Hyperlipidemia     Hypertension 2000    Persistent atrial fibrillation (H)     persistent, DCCV 12/2017, ablation 11/6/17    Pulmonary nodules     Sleep apnea     CPAP       PAST SURGICAL HISTORY:  Past Surgical History:   Procedure Laterality Date    ABLATION OF DYSRHYTHMIC FOCUS  2018, 04/12/2019    Procedure: EP Ablation Focal AFIB;  Surgeon: Fady Day MD;  Location:  [facility]    ACHILLES TENDON SURGERY  1997    ANESTHESIA CARDIOVERSION N/A 2/26/2019    Procedure: ANESTHESIA CARDIOVERSION (CONNER);  Surgeon: GENERIC ANESTHESIA PROVIDER;  Location:  OR    ANESTHESIA CARDIOVERSION N/A 11/19/2020     Procedure: ANESTHESIA, FOR CARDIOVERSION (dr campbell);  Surgeon: GENERIC ANESTHESIA PROVIDER;  Location:  OR    ANESTHESIA CARDIOVERSION N/A 8/22/2022    Procedure: CARDIOVERSION;  Surgeon: GENERIC ANESTHESIA PROVIDER;  Location:  OR    ANESTHESIA CARDIOVERSION N/A 9/13/2023    Procedure: Anesthesia cardioversion;  Surgeon: GENERIC ANESTHESIA PROVIDER;  Location:  OR    ANESTHESIA CARDIOVERSION N/A 10/6/2023    Procedure: Anesthesia cardioversion;  Surgeon: GENERIC ANESTHESIA PROVIDER;  Location:  OR    ANESTHESIA CARDIOVERSION N/A 8/1/2024    Procedure: Anesthesia cardioversion;  Surgeon: GENERIC ANESTHESIA PROVIDER;  Location:  OR    BASAL CELL CARCINOMA EXCISION Right 2009    nose, took cartilage from inner ear.    CARDIOVERSION  2012, 2019    cardioversion for atr fib    COLONOSCOPY  2008    COLONOSCOPY N/A 3/22/2023    Procedure: COLONOSCOPY, FLEXIBLE, WITH LESION REMOVAL USING SNARE;  Surgeon: Anne Lucero MD;  Location:  GI    CV RIGHT HEART CATH MEASUREMENTS RECORDED N/A 7/31/2024    Procedure: Right Heart Cath;  Surgeon: Adriano Lazo MD;  Location:  [facility]    EP ABLATION FOCAL AFIB N/A 4/12/2019    Procedure: EP Ablation Focal AFIB;  Surgeon: Fady Day MD;  Location:  [facility]    EP ABLATION FOCAL AFIB N/A 5/2/2024    Procedure: Ablation Atrial Fibrilation;  Surgeon: Fady Day MD;  Location:  [facility]    HERNIA REPAIR, UMBILICAL  08/20/2012    Procedure: HERNIORRHAPHY UMBILICAL;  UMBILICAL HERNIA REPAIR;  Surgeon: Ra Crocker MD;  Location: [facility]    HERNIORRHAPHY UMBILICAL  8/20/2012    Procedure: HERNIORRHAPHY UMBILICAL;  UMBILICAL HERNIA REPAIR;  Surgeon: Ra Crocker MD;  Location: [facility]    ORTHOPEDIC SURGERY      ORTHOPEDIC SURGERY      OTHER SURGICAL HISTORY Right 03/2017    total KNEE ARTHROSCOPY     TOTAL KNEE ARTHROPLASTY Left 03/16/2018    ZZC NONSPECIFIC PROCEDURE  1997    achilles tendon    ZZC  NONSPECIFIC PROCEDURE      knees, arthroscopy    [facility] NONSPECIFIC PROCEDURE      basal cell skin ca, nose    ZZC NONSPECIFIC PROCEDURE      flex sig; colonoscopy due 3/2008    Z NONSPECIFIC PROCEDURE      cardioversion for atr fib    ZC TOTAL KNEE ARTHROPLASTY Left 2018    Dr. Malick Suarez       FAMILY HISTORY:  Family History   Problem Relation Age of Onset    Family History Negative Mother     Heart Disease Father         decesased at 42 - [place]    Heart Disease Sister          [place]    Lipids Sister     Lipids Sister     No Known Problems Sister     Heart Disease Brother          MI    Heart Disease Brother         CABG AT 49    Lung Cancer No family hx of        SOCIAL HISTORY:  Social History     Socioeconomic History    Marital status: Single     Spouse name: None    Number of children: None    Years of education: None    Highest education level: None   Tobacco Use    Smoking status: Former     Current packs/day: 0.00     Average packs/day: 1 pack/day for 34.6 years (34.6 ttl pk-yrs)     Types: Cigarettes     Start date:      Quit date: 2011     Years since quittin.0     Passive exposure: Past    Smokeless tobacco: Never    Tobacco comments:     quit    Vaping Use    Vaping status: Never Used   Substance and Sexual Activity    Alcohol use: Not Currently    Drug use: Never    Sexual activity: Not Currently     Partners: Female     Birth control/protection: None   Social History Narrative    Worked in retail (-) and insurance sales     Spends some time on [place] on [place]       Review of Systems:  Skin:        Eyes:       ENT:       Respiratory:  Positive for dyspnea on exertion;CPAP;sleep apnea;shortness of breath;dyspnea at rest  Cardiovascular:  Negative;syncope or near-syncope;palpitations;cyanosis;chest pain Positive for;lightheadedness;dizziness;fatigue;edema  Gastroenterology:      Genitourinary:       Musculoskeletal:       Neurologic:      "  Psychiatric:       Heme/Lymph/Imm:  Positive for allergies  Endocrine:  Negative thyroid disorder;diabetes    Physical Exam:  Vitals: /84   Pulse 93   Ht 1.727 m (5' 8\")   Wt 133.6 kg (294 lb 9.6 oz)   SpO2 95%   BMI 44.79 kg/m      Constitutional:           Skin:             Head:           Eyes:           Lymph:      ENT:           Neck:           Respiratory:            Cardiac:                                                           GI:           Extremities and Muscular Skeletal:                 Neurological:           Psych:         Recent Lab Results:  LIPID RESULTS:  Lab Results   Component Value Date    CHOL 187 09/05/2024    CHOL 159 06/30/2021    HDL 35 (L) 09/05/2024    HDL 44 06/30/2021     (H) 09/05/2024    LDL 91 06/30/2021    TRIG 187 (H) 09/05/2024    TRIG 121 06/30/2021    CHOLHDLRATIO 3.8 03/16/2015       LIVER ENZYME RESULTS:  Lab Results   Component Value Date    AST 34 05/05/2024    AST 24 06/30/2021    ALT 40 05/05/2024    ALT 42 06/30/2021       CBC RESULTS:  Lab Results   Component Value Date    WBC 7.9 09/06/2024    WBC 9.8 09/21/2020    RBC 4.31 (L) 09/06/2024    RBC 4.47 09/21/2020    HGB 13.9 09/06/2024    HGB 14.4 09/21/2020    HCT 43.0 09/06/2024    HCT 44.7 09/21/2020     09/06/2024     09/21/2020    MCH 32.3 09/06/2024    MCH 32.2 09/21/2020    MCHC 32.3 09/06/2024    MCHC 32.2 09/21/2020    RDW 15.3 (H) 09/06/2024    RDW 13.3 09/21/2020     09/06/2024     09/21/2020       BMP RESULTS:  Lab Results   Component Value Date     09/06/2024     06/30/2021    POTASSIUM 3.6 09/06/2024    POTASSIUM 4.6 12/17/2022    POTASSIUM 3.6 06/30/2021    CHLORIDE 103 09/06/2024    CHLORIDE 106 12/17/2022    CHLORIDE 109 06/30/2021    CO2 27 09/06/2024    CO2 30 12/17/2022    CO2 27 06/30/2021    ANIONGAP 11 09/06/2024    ANIONGAP 4 12/17/2022    ANIONGAP 5 06/30/2021     (H) 09/06/2024     (H) 09/06/2024     (H) " 12/17/2022    GLC 89 06/30/2021    BUN 17.5 09/06/2024    BUN 15 12/17/2022    BUN 13 06/30/2021    CR 1.01 09/06/2024    CR 1.06 06/30/2021    GFRESTIMATED 79 09/06/2024    GFRESTIMATED 71 06/30/2021    GFRESTBLACK 82 06/30/2021    ASHELY 9.4 09/06/2024    ASHELY 8.7 06/30/2021        A1C RESULTS:  Lab Results   Component Value Date    A1C 6.5 (H) 09/05/2024    A1C 5.7 (H) 02/25/2019       INR RESULTS:  Lab Results   Component Value Date    INR 1.97 (H) 09/05/2024    INR 2.47 (H) 08/01/2024    INR 2.04 (H) 02/26/2019    INR 1.66 (H) 11/06/2017           CC  No referring provider defined for this encounter.

## 2024-09-12 NOTE — LETTER
"9/12/2024    Ranjan Courtney MD  [address] [facility] [address]    RE: Mike Schultz       Dear Colleague,     I had the pleasure of seeing Mike BRAXTON Schultz in the [facility].  HPI and Plan:   Brayden is here for longitudinal care and management of @ aortic stenosis.  Followed by Shanthi Wilkins and Dr. Day for persistent atrial fibrillation, CHF, now referred to structural clinic for consideration for AVR.    Patient saw Dr. Day 9/10 and noted that the patient had a recent episdoe of facial droopiness and dysarthria.  Has not missed a dose of Xarelto.  MRI brain showing subacute/acute infarct right frontal lobe.  Would recommend referral to neurology/PMD for discussion of etiology of stroke and further evaluation.     Patient has no chest pain chest pressure.  He does have some shortness of breath.  This is multifactorial.  I reviewed looked at his echocardiogram specifically looks to me like moderate to severe aortic valve stenosis but image quality is poor.  His ejection fraction is preserved.  I think to help with further try and differentiate whether or not this might be severe aortic stenosis I think a TAVR CT scan is indicated.  Will look at the calcium score on his valve.  My general impression is that we wait on the valve replacement.  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  Perhaps we might need a right and left heart catheterization and valve area determination by intracardiac catheterization.    Regarding his \"\" failed Xarelto.  The CT scan states that the territory and artery responsible M3 M4 for the stroke was not seen on the CT scan.  Therefore disease in that vessel dissection thrombosis etc. cannot be ruled out would be highly unusual for patient to have stroke secondary to atrial fibrillation while on full dose high dose Xarelto and has not missed a dose.  The entertainment of a left atrial appendage occlusion for \"\" failed Xarelto is an interesting " a concept however certainly we would need neurology to confirm that they believe that is the case and would need a CT scan or transesophageal echocardiogram to rule out thrombosis or assess for thrombosis of the left atrial appendage.  Multiple factors at play here patient needs neurology consultation, neuro rehab, pulmonary medicine to weigh in on the contribution of amiodarone toxicity due to and lung disease due to his shortness of breath and dyspnea cardia TAVR CT scan and possible left and right heart catheterization.      Patient had some degree of lung injury secondary to amiodarone toxicity.  Breathing is improving.  3 ablations for afib and multiple cardioversions.         Echocardiogram showing mean aortic gradient 27, DI .26, SVI low at 29, MARILYN   Echocardiogram reviewed appears moderate-severe range at this time.     Patient is not interested in open AVR.          The longitudinal plan of care for the diagnosis(es)/condition(s) as documented were addressed during this visit. Due to the added complexity in care, I will continue to support Brayden in the subsequent management and with ongoing continuity of care.     Today's clinic visit entailed:  Review of the result(s) of each unique test - Labs, echo, CT, MRI  Assessment requiring an independent historian(s) - significant other - SO  The following tests were independently interpreted by me as noted in my documentation: echo  Ordering of each unique test  60 minutes spent by me on the date of the encounter doing chart review, history and exam, documentation and further activities per the note  Provider  Link to [facility] Help Grid     The level of medical decision making during this visit was of high complexity.      No orders of the defined types were placed in this encounter.    No orders of the defined types were placed in this encounter.    There are no discontinued medications.      No diagnosis found.    CURRENT MEDICATIONS:  Current Outpatient Medications    Medication Sig Dispense Refill     allopurinol (ZYLOPRIM) 300 MG tablet TAKE ONE TABLET (300 MG) BY MOUTH ONE TIME DAILY 90 tablet 0     atorvastatin (LIPITOR) 40 MG tablet Take 1 tablet (40 mg) by mouth every evening. 60 tablet 3     bumetanide (BUMEX) 1 MG tablet 2 mg am and 2 mg pm (Patient taking differently: Take 2 mg by mouth 2 times daily.) 360 tablet 1     cetirizine (ZYRTEC) 10 MG tablet Take 10 mg by mouth every evening       finasteride (PROPECIA) 1 MG tablet Take 1 tablet (1 mg) by mouth daily 90 tablet 2     losartan (COZAAR) 25 MG tablet Take 0.5 tablets (12.5 mg) by mouth daily.       miconazole (MICATIN) 2 % external powder Apply topically 2 times daily 85 g 0     potassium chloride pushpa ER (KLOR-CON M20) 20 MEQ CR tablet Take 2 tablets (40 mEq) by mouth 2 times daily 120 tablet 0     psyllium (METAMUCIL/KONSYL) capsule Take 1-2 capsules by mouth daily       rivaroxaban ANTICOAGULANT (XARELTO ANTICOAGULANT) 20 MG TABS tablet Take 1 tablet (20 mg) by mouth daily (with dinner) 90 tablet 3       ALLERGIES     Allergies   Allergen Reactions     Amiodarone Shortness Of Breath     Suspected amiodarone toxicity involving lungs       PAST MEDICAL HISTORY:  Past Medical History:   Diagnosis Date     (HFpEF) heart failure with preserved ejection fraction (H)      Acute gouty arthritis      Alcohol use disorder in remission      Amiodarone pulmonary toxicity      Aortic stenosis      Basal cell carcinoma     Of the nose     Bell's palsy      Herpes simplex without mention of complication      Hyperlipidemia      Hypertension 2000     Persistent atrial fibrillation (H)     persistent, DCCV 12/2017, ablation 11/6/17     Pulmonary nodules      Sleep apnea     CPAP       PAST SURGICAL HISTORY:  Past Surgical History:   Procedure Laterality Date     ABLATION OF DYSRHYTHMIC FOCUS  2018, 04/12/2019    Procedure: EP Ablation Focal AFIB;  Surgeon: Fady Day MD;  Location:  HEART CARDIAC CATH LAB     ACHILLES  TENDON SURGERY  1997     ANESTHESIA CARDIOVERSION N/A 2/26/2019    Procedure: ANESTHESIA CARDIOVERSION (CONNER);  Surgeon: GENERIC ANESTHESIA PROVIDER;  Location:  OR     ANESTHESIA CARDIOVERSION N/A 11/19/2020    Procedure: ANESTHESIA, FOR CARDIOVERSION (dr campbell);  Surgeon: GENERIC ANESTHESIA PROVIDER;  Location:  OR     ANESTHESIA CARDIOVERSION N/A 8/22/2022    Procedure: CARDIOVERSION;  Surgeon: GENERIC ANESTHESIA PROVIDER;  Location:  OR     ANESTHESIA CARDIOVERSION N/A 9/13/2023    Procedure: Anesthesia cardioversion;  Surgeon: GENERIC ANESTHESIA PROVIDER;  Location:  OR     ANESTHESIA CARDIOVERSION N/A 10/6/2023    Procedure: Anesthesia cardioversion;  Surgeon: GENERIC ANESTHESIA PROVIDER;  Location:  OR     ANESTHESIA CARDIOVERSION N/A 8/1/2024    Procedure: Anesthesia cardioversion;  Surgeon: GENERIC ANESTHESIA PROVIDER;  Location:  OR     BASAL CELL CARCINOMA EXCISION Right 2009    nose, took cartilage from inner ear.     CARDIOVERSION  2012, 2019    cardioversion for atr fib     COLONOSCOPY  2008     COLONOSCOPY N/A 3/22/2023    Procedure: COLONOSCOPY, FLEXIBLE, WITH LESION REMOVAL USING SNARE;  Surgeon: Anne Lucero MD;  Location:  GI     CV RIGHT HEART CATH MEASUREMENTS RECORDED N/A 7/31/2024    Procedure: Right Heart Cath;  Surgeon: Adriano Lazo MD;  Location:  HEART CARDIAC CATH LAB     EP ABLATION FOCAL AFIB N/A 4/12/2019    Procedure: EP Ablation Focal AFIB;  Surgeon: Fady Day MD;  Location:  HEART CARDIAC CATH LAB     EP ABLATION FOCAL AFIB N/A 5/2/2024    Procedure: Ablation Atrial Fibrilation;  Surgeon: Fady Day MD;  Location:  HEART CARDIAC CATH LAB     HERNIA REPAIR, UMBILICAL  08/20/2012    Procedure: HERNIORRHAPHY UMBILICAL;  UMBILICAL HERNIA REPAIR;  Surgeon: Ra Crocker MD;  Location:  [facility]     HERNIORRHAPHY UMBILICAL  8/20/2012    Procedure: HERNIORRHAPHY UMBILICAL;  UMBILICAL HERNIA REPAIR;  Surgeon: Ra Crocker MD;  Location:  [facility]     ORTHOPEDIC SURGERY       ORTHOPEDIC SURGERY       OTHER SURGICAL HISTORY Right 2017    total KNEE ARTHROSCOPY      TOTAL KNEE ARTHROPLASTY Left 2018     ZZ NONSPECIFIC PROCEDURE      achilles tendon     ZZC NONSPECIFIC PROCEDURE      knees, arthroscopy     ZZC NONSPECIFIC PROCEDURE      basal cell skin ca, nose     ZZC NONSPECIFIC PROCEDURE      flex sig; colonoscopy due 3/2008     ZZC NONSPECIFIC PROCEDURE      cardioversion for atr fib     ZZC TOTAL KNEE ARTHROPLASTY Left 2018    Dr. Malick Suarez       FAMILY HISTORY:  Family History   Problem Relation Age of Onset     Family History Negative Mother      Heart Disease Father         decesased at 42 - MI     Heart Disease Sister          MI     Lipids Sister      Lipids Sister      No Known Problems Sister      Heart Disease Brother          MI     Heart Disease Brother         CABG AT 49     Lung Cancer No family hx of        SOCIAL HISTORY:  Social History     Socioeconomic History     Marital status: Single     Spouse name: None     Number of children: None     Years of education: None     Highest education level: None   Tobacco Use     Smoking status: Former     Current packs/day: 0.00     Average packs/day: 1 pack/day for 34.6 years (34.6 ttl pk-yrs)     Types: Cigarettes     Start date:      Quit date: 2011     Years since quittin.0     Passive exposure: Past     Smokeless tobacco: Never     Tobacco comments:     quit    Vaping Use     Vaping status: Never Used   Substance and Sexual Activity     Alcohol use: Not Currently     Drug use: Never     Sexual activity: Not Currently     Partners: Female     Birth control/protection: None   Social History Narrative    Worked in retail () and insurance sales     Spends some time on [place] on [place]       Review of Systems:  Skin:        Eyes:       ENT:       Respiratory:  Positive for dyspnea on exertion;CPAP;sleep apnea;shortness of  "breath;dyspnea at rest  Cardiovascular:  Negative;syncope or near-syncope;palpitations;cyanosis;chest pain Positive for;lightheadedness;dizziness;fatigue;edema  Gastroenterology:      Genitourinary:       Musculoskeletal:       Neurologic:       Psychiatric:       Heme/Lymph/Imm:  Positive for allergies  Endocrine:  Negative thyroid disorder;diabetes    Physical Exam:  Vitals: /84   Pulse 93   Ht 1.727 m (5' 8\")   Wt 133.6 kg (294 lb 9.6 oz)   SpO2 95%   BMI 44.79 kg/m      Constitutional:           Skin:             Head:           Eyes:           Lymph:      ENT:           Neck:           Respiratory:            Cardiac:                                                           GI:           Extremities and Muscular Skeletal:                 Neurological:           Psych:         Recent Lab Results:  LIPID RESULTS:  Lab Results   Component Value Date    CHOL 187 09/05/2024    CHOL 159 06/30/2021    HDL 35 (L) 09/05/2024    HDL 44 06/30/2021     (H) 09/05/2024    LDL 91 06/30/2021    TRIG 187 (H) 09/05/2024    TRIG 121 06/30/2021    CHOLHDLRATIO 3.8 03/16/2015       LIVER ENZYME RESULTS:  Lab Results   Component Value Date    AST 34 05/05/2024    AST 24 06/30/2021    ALT 40 05/05/2024    ALT 42 06/30/2021       CBC RESULTS:  Lab Results   Component Value Date    WBC 7.9 09/06/2024    WBC 9.8 09/21/2020    RBC 4.31 (L) 09/06/2024    RBC 4.47 09/21/2020    HGB 13.9 09/06/2024    HGB 14.4 09/21/2020    HCT 43.0 09/06/2024    HCT 44.7 09/21/2020     09/06/2024     09/21/2020    MCH 32.3 09/06/2024    MCH 32.2 09/21/2020    MCHC 32.3 09/06/2024    MCHC 32.2 09/21/2020    RDW 15.3 (H) 09/06/2024    RDW 13.3 09/21/2020     09/06/2024     09/21/2020       BMP RESULTS:  Lab Results   Component Value Date     09/06/2024     06/30/2021    POTASSIUM 3.6 09/06/2024    POTASSIUM 4.6 12/17/2022    POTASSIUM 3.6 06/30/2021    CHLORIDE 103 09/06/2024    CHLORIDE 106 " 12/17/2022    CHLORIDE 109 06/30/2021    CO2 27 09/06/2024    CO2 30 12/17/2022    CO2 27 06/30/2021    ANIONGAP 11 09/06/2024    ANIONGAP 4 12/17/2022    ANIONGAP 5 06/30/2021     (H) 09/06/2024     (H) 09/06/2024     (H) 12/17/2022    GLC 89 06/30/2021    BUN 17.5 09/06/2024    BUN 15 12/17/2022    BUN 13 06/30/2021    CR 1.01 09/06/2024    CR 1.06 06/30/2021    GFRESTIMATED 79 09/06/2024    GFRESTIMATED 71 06/30/2021    GFRESTBLACK 82 06/30/2021    ASHELY 9.4 09/06/2024    ASHELY 8.7 06/30/2021        A1C RESULTS:  Lab Results   Component Value Date    A1C 6.5 (H) 09/05/2024    A1C 5.7 (H) 02/25/2019       INR RESULTS:  Lab Results   Component Value Date    INR 1.97 (H) 09/05/2024    INR 2.47 (H) 08/01/2024    INR 2.04 (H) 02/26/2019    INR 1.66 (H) 11/06/2017           CC  No referring provider defined for this encounter.                  Completed frailty testing and KCCQ.   5 meter walk: 4.9 seconds             4.8 seconds             4.5 seconds  Frailty score: 0/5    KCCQ Results:   1a. 3  1b. 1  1c. 1  2. 1  3. 1  4. 1  5. 1  6. 1  7. 1  8a. 1  8b. 1  8c. 1    Preliminary STS Risk Score: 1.81%  NYHA Class: ll    Plan to follow up with pulmonology, neurology and general cardiology. Patient has post hospitalization follow up on 9/24, EP follow up 10/4, pulmonology follow up 10/31 and general cardiology follow up 11/15. Patient given scheduling numbers to schedule neurology follow up and RAMIRO as recommended by neurology post hospitalization. Patient scheduled to have TAVR CT on 9/19 to evaluate calcium score to better assess valve severity. Pending neurology workup, patient may be a Watchman candidate. Patient stated he has not been to a routine dental cleaning in a couple of years, but had a broken tooth evaluated a few weeks ago with no intervention. Patient instructed to make dental cleaning appointment. Patient has supportive partner, Katty, that is involved in patient care.    Haley HUTCHINS  Molly RN  Structural Heart Coordinator  [facility]       Thank you for allowing me to participate in the care of your patient.      Sincerely,     GUADALUPE JUAREZ MD     [facility]  cc:   No referring provider defined for this encounter.

## 2024-09-12 NOTE — PROGRESS NOTES
Completed frailty testing and KCCQ.   5 meter walk: 4.9 seconds             4.8 seconds             4.5 seconds  Frailty score: 0/5    KCCQ Results:   1a. 3  1b. 1  1c. 1  2. 1  3. 1  4. 1  5. 1  6. 1  7. 1  8a. 1  8b. 1  8c. 1    Preliminary STS Risk Score: 1.81%  NYHA Class: ll    Plan to follow up with pulmonology, neurology and general cardiology. Patient has post hospitalization follow up on 9/24, EP follow up 10/4, pulmonology follow up 10/31 and general cardiology follow up 11/15. Patient given scheduling numbers to schedule neurology follow up and RAMIRO as recommended by neurology post hospitalization. Patient scheduled to have TAVR CT on 9/19 to evaluate calcium score to better assess valve severity. Pending neurology workup, patient may be a Watchman candidate. Patient stated he has not been to a routine dental cleaning in a couple of years, but had a broken tooth evaluated a few weeks ago with no intervention. Patient instructed to make dental cleaning appointment. Patient has supportive partner, Katty, that is involved in patient care.    Haley Barnes RN  Structural Heart Coordinator  [facility]

## 2024-09-13 ENCOUNTER — TELEPHONE (OUTPATIENT)
Dept: INTERNAL MEDICINE | Facility: CLINIC | Age: 74
End: 2024-09-13
Payer: COMMERCIAL

## 2024-09-13 NOTE — TELEPHONE ENCOUNTER
Home Care is calling regarding an established patient with [facility].       Requesting orders from: Ranjan Courtney  Provider is following patient: Yes  Is this a 60-day recertification request?  No    Orders Requested    Physical Therapy  Request for initial certification (first set of orders)   Frequency:  1x/wk for 1 wks + 2x/wk for 5wk    Speech Therapy  Request for initial certification (first set of orders)   Frequency:  1x/wk for 1 wks + 2x/wk for 4wk + 1x/wk for 1wk    Information was gathered and will be sent to provider for review.  RN will contact Home Care with information after provider review.  Confirmed ok to leave a detailed message with call back.  Contact information confirmed and updated as needed.    Otilia Mckeon RN

## 2024-09-17 ENCOUNTER — MEDICAL CORRESPONDENCE (OUTPATIENT)
Dept: HEALTH INFORMATION MANAGEMENT | Facility: CLINIC | Age: 74
End: 2024-09-17
Payer: COMMERCIAL

## 2024-09-18 ENCOUNTER — MYC REFILL (OUTPATIENT)
Dept: INTERNAL MEDICINE | Facility: CLINIC | Age: 74
End: 2024-09-18
Payer: COMMERCIAL

## 2024-09-18 DIAGNOSIS — I10 ESSENTIAL HYPERTENSION: ICD-10-CM

## 2024-09-18 RX ORDER — LOSARTAN POTASSIUM 25 MG/1
12.5 TABLET ORAL DAILY
Qty: 90 TABLET | Refills: 0 | Status: SHIPPED | OUTPATIENT
Start: 2024-09-18 | End: 2024-09-19

## 2024-09-19 ENCOUNTER — MYC MEDICAL ADVICE (OUTPATIENT)
Dept: CARDIOLOGY | Facility: CLINIC | Age: 74
End: 2024-09-19
Payer: COMMERCIAL

## 2024-09-19 DIAGNOSIS — E87.6 HYPOKALEMIA: ICD-10-CM

## 2024-09-19 DIAGNOSIS — I10 BENIGN ESSENTIAL HYPERTENSION: ICD-10-CM

## 2024-09-19 DIAGNOSIS — I10 ESSENTIAL HYPERTENSION: ICD-10-CM

## 2024-09-19 RX ORDER — LOSARTAN POTASSIUM 25 MG/1
12.5 TABLET ORAL DAILY
Qty: 45 TABLET | Refills: 2 | Status: SHIPPED | OUTPATIENT
Start: 2024-09-19

## 2024-09-19 RX ORDER — POTASSIUM CHLORIDE 1500 MG/1
40 TABLET, EXTENDED RELEASE ORAL 2 TIMES DAILY
Qty: 360 TABLET | Refills: 2 | Status: SHIPPED | OUTPATIENT
Start: 2024-09-19

## 2024-09-20 ENCOUNTER — HOSPITAL ENCOUNTER (OUTPATIENT)
Dept: CARDIOLOGY | Facility: CLINIC | Age: 74
Discharge: HOME | End: 2024-09-20
Attending: INTERNAL MEDICINE | Admitting: INTERNAL MEDICINE
Payer: COMMERCIAL

## 2024-09-20 VITALS — HEART RATE: 88 BPM | SYSTOLIC BLOOD PRESSURE: 176 MMHG | DIASTOLIC BLOOD PRESSURE: 106 MMHG

## 2024-09-20 DIAGNOSIS — I35.0 AORTIC VALVE STENOSIS, ETIOLOGY OF CARDIAC VALVE DISEASE UNSPECIFIED: ICD-10-CM

## 2024-09-20 PROCEDURE — 250N000011 HC RX IP 250 OP 636: Performed by: INTERNAL MEDICINE

## 2024-09-20 PROCEDURE — 71275 CT ANGIOGRAPHY CHEST: CPT | Mod: 26 | Performed by: INTERNAL MEDICINE

## 2024-09-20 PROCEDURE — 74174 CTA ABD&PLVS W/CONTRAST: CPT | Mod: 26 | Performed by: INTERNAL MEDICINE

## 2024-09-20 PROCEDURE — 74174 CTA ABD&PLVS W/CONTRAST: CPT

## 2024-09-20 RX ORDER — LIDOCAINE 40 MG/G
CREAM TOPICAL
Status: DISCONTINUED | OUTPATIENT
Start: 2024-09-20 | End: 2024-09-21 | Stop reason: HOSPADM

## 2024-09-20 RX ORDER — DIPHENHYDRAMINE HYDROCHLORIDE 50 MG/ML
25-50 INJECTION INTRAMUSCULAR; INTRAVENOUS
Status: DISCONTINUED | OUTPATIENT
Start: 2024-09-20 | End: 2024-09-21 | Stop reason: HOSPADM

## 2024-09-20 RX ORDER — ONDANSETRON 2 MG/ML
4 INJECTION INTRAMUSCULAR; INTRAVENOUS
Status: DISCONTINUED | OUTPATIENT
Start: 2024-09-20 | End: 2024-09-21 | Stop reason: HOSPADM

## 2024-09-20 RX ORDER — METHYLPREDNISOLONE SODIUM SUCCINATE 125 MG/2ML
125 INJECTION, POWDER, LYOPHILIZED, FOR SOLUTION INTRAMUSCULAR; INTRAVENOUS
Status: DISCONTINUED | OUTPATIENT
Start: 2024-09-20 | End: 2024-09-21 | Stop reason: HOSPADM

## 2024-09-20 RX ORDER — IOPAMIDOL 755 MG/ML
500 INJECTION, SOLUTION INTRAVASCULAR ONCE
Status: COMPLETED | OUTPATIENT
Start: 2024-09-20 | End: 2024-09-20

## 2024-09-20 RX ORDER — DIPHENHYDRAMINE HCL 25 MG
25 CAPSULE ORAL
Status: DISCONTINUED | OUTPATIENT
Start: 2024-09-20 | End: 2024-09-21 | Stop reason: HOSPADM

## 2024-09-20 RX ADMIN — IOPAMIDOL 140 ML: 755 INJECTION, SOLUTION INTRAVENOUS at 12:20

## 2024-09-23 ENCOUNTER — TELEPHONE (OUTPATIENT)
Dept: CARDIOLOGY | Facility: CLINIC | Age: 74
End: 2024-09-23
Payer: COMMERCIAL

## 2024-09-23 DIAGNOSIS — I35.0 NONRHEUMATIC AORTIC VALVE STENOSIS: Primary | ICD-10-CM

## 2024-09-23 NOTE — TELEPHONE ENCOUNTER
Returned patient call. Discussed TAVR CT with Dr. Muse. Plan to proceed with coronary angiogram per Dr. Muse. Reviewed CT results and Dr. Au recommendations. Patient verbalized understanding and in agreement with plan. Message sent to scheduling to set up angiogram.

## 2024-09-24 ENCOUNTER — OFFICE VISIT (OUTPATIENT)
Dept: INTERNAL MEDICINE | Facility: CLINIC | Age: 74
End: 2024-09-24
Payer: COMMERCIAL

## 2024-09-24 ENCOUNTER — MEDICAL CORRESPONDENCE (OUTPATIENT)
Dept: HEALTH INFORMATION MANAGEMENT | Facility: CLINIC | Age: 74
End: 2024-09-24

## 2024-09-24 VITALS
SYSTOLIC BLOOD PRESSURE: 113 MMHG | WEIGHT: 288.3 LBS | OXYGEN SATURATION: 95 % | DIASTOLIC BLOOD PRESSURE: 80 MMHG | HEART RATE: 97 BPM | HEIGHT: 68 IN | BODY MASS INDEX: 43.69 KG/M2 | RESPIRATION RATE: 18 BRPM | TEMPERATURE: 98.2 F

## 2024-09-24 DIAGNOSIS — Z87.448 HX OF ACUTE RENAL FAILURE: ICD-10-CM

## 2024-09-24 DIAGNOSIS — T38.0X5A STEROID-INDUCED HYPERGLYCEMIA: ICD-10-CM

## 2024-09-24 DIAGNOSIS — I63.9 ACUTE CVA (CEREBROVASCULAR ACCIDENT) (H): Primary | ICD-10-CM

## 2024-09-24 DIAGNOSIS — R73.9 STEROID-INDUCED HYPERGLYCEMIA: ICD-10-CM

## 2024-09-24 PROCEDURE — 99495 TRANSJ CARE MGMT MOD F2F 14D: CPT | Performed by: INTERNAL MEDICINE

## 2024-09-24 NOTE — PROGRESS NOTES
"  Assessment & Plan     Acute CVA (cerebrovascular accident) (H)  Cont current meds  Other than facial droop - sx resolved. Cont f/u with therapy.   F/u PCP this fall.     Hx of acute renal failure  Resolved.     Steroid-induced hyperglycemia  - Hemoglobin A1c; Future  - Glucose; Future        MED REC REQUIRED  Post Medication Reconciliation Status:  Discharge medications reconciled, continue medications without change  BMI  Estimated body mass index is 43.84 kg/m  as calculated from the following:    Height as of this encounter: 1.727 m (5' 8\").    Weight as of this encounter: 130.8 kg (288 lb 4.8 oz).             Jerri Oewn is a 74 year old, presenting for the following health issues:  Hospital F/U    HPI   Hospital Follow-up Visit:    Hospital/Nursing Home/IP Rehab Facility: [facility]  Date of Admission: 09/05/2024  Date of Discharge: 09/06/2024  Reason(s) for Admission: Acute CVA (cerebrovascular accident) (H)  Facial droop  Dysarthria  Essential hypertension  Acute decompensated heart failure (H)      Problems taking medications regularly:  None  Medication changes since discharge: None  Problems adhering to non-medication therapy:  None    Summary of hospitalization:  [facility] discharge summary reviewed  Diagnostic Tests/Treatments reviewed.  Follow up needed: none  Other Healthcare Providers Involved in Patient s Care:         Specialist appointment -    Update since discharge: stable.       Plan of care communicated with patient                         Objective    /80   Pulse 97   Temp 98.2  F (36.8  C) (Temporal)   Resp 18   Ht 1.727 m (5' 8\")   Wt 130.8 kg (288 lb 4.8 oz)   SpO2 95%   BMI 43.84 kg/m    Body mass index is 43.84 kg/m .  Physical Exam   GENERAL: no distress and obese  EYES: Eyes grossly normal to inspection, PERRL and conjunctivae and sclerae normal  HENT: L facial droop noted.   NECK: no adenopathy, no asymmetry, masses, or " scars  RESP: lungs clear to auscultation - no rales, rhonchi or wheezes  CV: regular rate and rhythm, normal S1 S2, no S3 or S4, no murmur, click or rub, no peripheral edema  ABDOMEN: soft, nontender, no hepatosplenomegaly, no masses and bowel sounds normal  MS: no gross musculoskeletal defects noted, no edema  SKIN: no suspicious lesions or rashes  NEURO: facial droop noted above, Normal strength and tone, sensory exam grossly normal, and mentation intact  PSYCH: mentation appears normal, affect normal/bright    Admission on 09/05/2024, Discharged on 09/06/2024   Component Date Value Ref Range Status    Sodium 09/05/2024 140  135 - 145 mmol/L Final    Potassium 09/05/2024 3.6  3.4 - 5.3 mmol/L Final    Chloride 09/05/2024 102  98 - 107 mmol/L Final    Carbon Dioxide (CO2) 09/05/2024 27  22 - 29 mmol/L Final    Anion Gap 09/05/2024 11  7 - 15 mmol/L Final    Urea Nitrogen 09/05/2024 16.3  8.0 - 23.0 mg/dL Final    Creatinine 09/05/2024 1.04  0.67 - 1.17 mg/dL Final    GFR Estimate 09/05/2024 76  >60 mL/min/1.73m2 Final    eGFR calculated using 2021 CKD-EPI equation.    Calcium 09/05/2024 9.1  8.8 - 10.4 mg/dL Final    Reference intervals for this test were updated on 7/16/2024 to reflect our healthy population more accurately. There may be differences in the flagging of prior results with similar values performed with this method. Those prior results can be interpreted in the context of the updated reference intervals.    Glucose 09/05/2024 104 (H)  70 - 99 mg/dL Final    INR 09/05/2024 1.97 (H)  0.85 - 1.15 Final    aPTT 09/05/2024 37  22 - 38 Seconds Final    Troponin T, High Sensitivity 09/05/2024 32 (H)  <=22 ng/L Final    Either a High Sensitivity Troponin T baseline (0 hours) value = 100 ng/L, or an increase in High Sensitivity Troponin T = 7 ng/L at 2 hours compared to 0 hours (2-0 hours), suggests myocardial injury, and urgent clinical attention is required.    If the 2-0 hours increase is <7 ng/L, a High  Sensitivity Troponin T result above gender-specific reference ranges warrants further evaluation.   Recommendations for further evaluation include correlation with clinical decision-making tool (e.g., HEART), a 3rd High Sensitivity Troponin T test 2 hours after the 2nd (a 20% change from baseline would represent concern), admission for observation, close PCC/cardiology follow-up, or urgent outpatient provocative testing.    Ventricular Rate 09/05/2024 67  BPM Final    Atrial Rate 09/05/2024 67  BPM Final    OK Interval 09/05/2024 214  ms Final    QRS Duration 09/05/2024 94  ms Final    QT 09/05/2024 514  ms Final    QTc 09/05/2024 543  ms Final    P Axis 09/05/2024 45  degrees Final    R AXIS 09/05/2024 37  degrees Final    T Axis 09/05/2024 72  degrees Final    Interpretation ECG 09/05/2024    Final                    Value:Sinus rhythm with 1st degree A-V block  Prolonged QT  Abnormal ECG  When compared with ECG of 09-Aug-2024 07:24,  QRS duration has increased  ST no longer depressed in Inferior leads  Confirmed by GENERATED REPORT, COMPUTER ([id number]),  CHANDRIKA ASH ([id number]) on 9/5/2024 12:23:08 PM      WBC Count 09/05/2024 9.2  4.0 - 11.0 10e3/uL Final    RBC Count 09/05/2024 4.37 (L)  4.40 - 5.90 10e6/uL Final    Hemoglobin 09/05/2024 14.3  13.3 - 17.7 g/dL Final    Hematocrit 09/05/2024 43.7  40.0 - 53.0 % Final    MCV 09/05/2024 100  78 - 100 fL Final    MCH 09/05/2024 32.7  26.5 - 33.0 pg Final    MCHC 09/05/2024 32.7  31.5 - 36.5 g/dL Final    RDW 09/05/2024 15.2 (H)  10.0 - 15.0 % Final    Platelet Count 09/05/2024 255  150 - 450 10e3/uL Final    % Neutrophils 09/05/2024 63  % Final    % Lymphocytes 09/05/2024 21  % Final    % Monocytes 09/05/2024 12  % Final    % Eosinophils 09/05/2024 3  % Final    % Basophils 09/05/2024 1  % Final    % Immature Granulocytes 09/05/2024 0  % Final    NRBCs per 100 WBC 09/05/2024 0  <1 /100 Final    Absolute Neutrophils 09/05/2024 5.8  1.6 - 8.3 10e3/uL Final     Absolute Lymphocytes 09/05/2024 2.0  0.8 - 5.3 10e3/uL Final    Absolute Monocytes 09/05/2024 1.1  0.0 - 1.3 10e3/uL Final    Absolute Eosinophils 09/05/2024 0.3  0.0 - 0.7 10e3/uL Final    Absolute Basophils 09/05/2024 0.1  0.0 - 0.2 10e3/uL Final    Absolute Immature Granulocytes 09/05/2024 0.0  <=0.4 10e3/uL Final    Absolute NRBCs 09/05/2024 0.0  10e3/uL Final    Magnesium 09/05/2024 1.7  1.7 - 2.3 mg/dL Final    N terminal Pro BNP Inpatient 09/05/2024 710  0 - 900 pg/mL Final    Reference range shown and results flagged as abnormal are suggested inpatient cut points for confirming diagnosis if CHF in an acute setting. Establishing a baseline value for each individual patient is useful for follow-up. An inpatient or emergency department NT-proPBNP <300 pg/mL effectively rules out acute CHF, with 99% negative predictive value.    The outpatient non-acute reference range for ruling out CHF is:  0-125 pg/mL (age 18 to less than 75)  0-450 pg/mL (age 75 yrs and older)     Hemoglobin A1C 09/05/2024 6.5 (H)  <5.7 % Final    Normal <5.7%   Prediabetes 5.7-6.4%    Diabetes 6.5% or higher     Note: Adopted from ADA consensus guidelines.    Cholesterol 09/05/2024 187  <200 mg/dL Final    Triglycerides 09/05/2024 187 (H)  <150 mg/dL Final    Direct Measure HDL 09/05/2024 35 (L)  >=40 mg/dL Final    LDL Cholesterol Calculated 09/05/2024 115 (H)  <=100 mg/dL Final    Non HDL Cholesterol 09/05/2024 152 (H)  <130 mg/dL Final    LVEF  09/06/2024 55-60%   Final    GLUCOSE BY METER POCT 09/05/2024 122 (H)  70 - 99 mg/dL Final    WBC Count 09/06/2024 7.9  4.0 - 11.0 10e3/uL Final    RBC Count 09/06/2024 4.31 (L)  4.40 - 5.90 10e6/uL Final    Hemoglobin 09/06/2024 13.9  13.3 - 17.7 g/dL Final    Hematocrit 09/06/2024 43.0  40.0 - 53.0 % Final    MCV 09/06/2024 100  78 - 100 fL Final    MCH 09/06/2024 32.3  26.5 - 33.0 pg Final    MCHC 09/06/2024 32.3  31.5 - 36.5 g/dL Final    RDW 09/06/2024 15.3 (H)  10.0 - 15.0 % Final     Platelet Count 09/06/2024 250  150 - 450 10e3/uL Final    Sodium 09/06/2024 141  135 - 145 mmol/L Final    Potassium 09/06/2024 3.6  3.4 - 5.3 mmol/L Final    Chloride 09/06/2024 103  98 - 107 mmol/L Final    Carbon Dioxide (CO2) 09/06/2024 27  22 - 29 mmol/L Final    Anion Gap 09/06/2024 11  7 - 15 mmol/L Final    Urea Nitrogen 09/06/2024 17.5  8.0 - 23.0 mg/dL Final    Creatinine 09/06/2024 1.01  0.67 - 1.17 mg/dL Final    GFR Estimate 09/06/2024 79  >60 mL/min/1.73m2 Final    eGFR calculated using 2021 CKD-EPI equation.    Calcium 09/06/2024 9.4  8.8 - 10.4 mg/dL Final    Reference intervals for this test were updated on 7/16/2024 to reflect our healthy population more accurately. There may be differences in the flagging of prior results with similar values performed with this method. Those prior results can be interpreted in the context of the updated reference intervals.    Glucose 09/06/2024 148 (H)  70 - 99 mg/dL Final    Magnesium 09/06/2024 1.8  1.7 - 2.3 mg/dL Final    GLUCOSE BY METER POCT 09/06/2024 106 (H)  70 - 99 mg/dL Final    GLUCOSE BY METER POCT 09/06/2024 123 (H)  70 - 99 mg/dL Final           Signed Electronically by: Mahendra Braun MD

## 2024-09-26 ENCOUNTER — TELEPHONE (OUTPATIENT)
Dept: CARDIOLOGY | Facility: CLINIC | Age: 74
End: 2024-09-26
Payer: COMMERCIAL

## 2024-09-26 DIAGNOSIS — I48.0 PAROXYSMAL ATRIAL FIBRILLATION (H): Primary | ICD-10-CM

## 2024-09-26 NOTE — TELEPHONE ENCOUNTER
9/26/24 Msg recd from Crystal Campos, Natalie Rudd, Caitlin Johnson, RN  Pls get CBC, NTpBNP and CMP ... That should cover a lot of bases!     Orders placed    KHerroRN 218 pm

## 2024-09-30 ENCOUNTER — DOCUMENTATION ONLY (OUTPATIENT)
Dept: CARDIOLOGY | Facility: CLINIC | Age: 74
End: 2024-09-30

## 2024-09-30 ENCOUNTER — TELEPHONE (OUTPATIENT)
Dept: CARDIOLOGY | Facility: CLINIC | Age: 74
End: 2024-09-30

## 2024-09-30 ENCOUNTER — LAB (OUTPATIENT)
Dept: LAB | Facility: CLINIC | Age: 74
End: 2024-09-30
Payer: COMMERCIAL

## 2024-09-30 DIAGNOSIS — I48.0 PAROXYSMAL ATRIAL FIBRILLATION (H): ICD-10-CM

## 2024-09-30 DIAGNOSIS — I35.0 NONRHEUMATIC AORTIC VALVE STENOSIS: Primary | ICD-10-CM

## 2024-09-30 LAB
ALBUMIN SERPL BCG-MCNC: 3.6 G/DL (ref 3.5–5.2)
ALP SERPL-CCNC: 116 U/L (ref 40–150)
ALT SERPL W P-5'-P-CCNC: 16 U/L (ref 0–70)
ANION GAP SERPL CALCULATED.3IONS-SCNC: 12 MMOL/L (ref 7–15)
AST SERPL W P-5'-P-CCNC: 23 U/L (ref 0–45)
BILIRUB SERPL-MCNC: 0.5 MG/DL
BUN SERPL-MCNC: 10.3 MG/DL (ref 8–23)
CALCIUM SERPL-MCNC: 8.6 MG/DL (ref 8.8–10.4)
CHLORIDE SERPL-SCNC: 100 MMOL/L (ref 98–107)
CREAT SERPL-MCNC: 0.9 MG/DL (ref 0.67–1.17)
EGFRCR SERPLBLD CKD-EPI 2021: 90 ML/MIN/1.73M2
ERYTHROCYTE [DISTWIDTH] IN BLOOD BY AUTOMATED COUNT: 14.7 % (ref 10–15)
GLUCOSE SERPL-MCNC: 122 MG/DL (ref 70–99)
HCO3 SERPL-SCNC: 27 MMOL/L (ref 22–29)
HCT VFR BLD AUTO: 42.5 % (ref 40–53)
HGB BLD-MCNC: 13.7 G/DL (ref 13.3–17.7)
MCH RBC QN AUTO: 31.6 PG (ref 26.5–33)
MCHC RBC AUTO-ENTMCNC: 32.2 G/DL (ref 31.5–36.5)
MCV RBC AUTO: 98 FL (ref 78–100)
NT-PROBNP SERPL-MCNC: 391 PG/ML (ref 0–900)
PLATELET # BLD AUTO: 240 10E3/UL (ref 150–450)
POTASSIUM SERPL-SCNC: 3.2 MMOL/L (ref 3.4–5.3)
PROT SERPL-MCNC: 6.8 G/DL (ref 6.4–8.3)
RBC # BLD AUTO: 4.34 10E6/UL (ref 4.4–5.9)
SODIUM SERPL-SCNC: 139 MMOL/L (ref 135–145)
WBC # BLD AUTO: 10.6 10E3/UL (ref 4–11)

## 2024-09-30 PROCEDURE — 36415 COLL VENOUS BLD VENIPUNCTURE: CPT | Performed by: PHYSICIAN ASSISTANT

## 2024-09-30 PROCEDURE — 80053 COMPREHEN METABOLIC PANEL: CPT | Performed by: PHYSICIAN ASSISTANT

## 2024-09-30 PROCEDURE — 83880 ASSAY OF NATRIURETIC PEPTIDE: CPT | Performed by: PHYSICIAN ASSISTANT

## 2024-09-30 PROCEDURE — 85027 COMPLETE CBC AUTOMATED: CPT | Performed by: PHYSICIAN ASSISTANT

## 2024-09-30 RX ORDER — SODIUM CHLORIDE 9 MG/ML
INJECTION, SOLUTION INTRAVENOUS CONTINUOUS
Status: CANCELLED | OUTPATIENT
Start: 2024-09-30

## 2024-09-30 RX ORDER — POTASSIUM CHLORIDE 1500 MG/1
20 TABLET, EXTENDED RELEASE ORAL
Status: CANCELLED | OUTPATIENT
Start: 2024-09-30

## 2024-09-30 RX ORDER — ASPIRIN 81 MG/1
243 TABLET, CHEWABLE ORAL ONCE
Status: CANCELLED | OUTPATIENT
Start: 2024-09-30

## 2024-09-30 RX ORDER — ASPIRIN 325 MG
325 TABLET ORAL ONCE
Status: CANCELLED | OUTPATIENT
Start: 2024-09-30 | End: 2024-09-30

## 2024-09-30 RX ORDER — LIDOCAINE 40 MG/G
CREAM TOPICAL
Status: CANCELLED | OUTPATIENT
Start: 2024-09-30

## 2024-09-30 NOTE — TELEPHONE ENCOUNTER
Coronary angiogram/PCI/Right Heart Cath prep instructions.     Patient is scheduled for a Coronary Angiogram at [facility] - [address] [place] [address] - Main Entrance of the Hospital on 10/7/24.  Check in time is at 6:30 AM (RAMIRO at 8:30 AM) and procedure to follow.    Patient instructed to remain NPO for solid foods 8 hours prior to arrival and may have clear liquids up to 2 hours prior to arrival.    Patient does not require extra fluids prior to procedure.    Patient is not diabetic.    Patient is on Xarelto (Eliquis, Pradaxa, Xarelto) and has been advised to hold for 2 days prior to procedure starting 10/5/24.  Patient can resume after the procedure.    Patient is having a Right Heart Cath and should continue bumex as prescribed.    Patient is not currently taking ASA and has been advised to take one 325 mg tablet the day prior and morning of the procedure. (Or 81mg aspirin x4)    Pt is not on a SGLT2 inhibitor.    Pt is not on a GLP-1 Agonist.    Patient advised to take their other daily medications the morning of the procedure with small sips of water.     Patient advised to shower the night before and morning of their procedure with regular soap.    Verified patient does not have a contrast allergy.    Verified patient has someone available to drive them home from the hospital and can stay with them for 24 hours after the procedure.     Patient advised they will have bedrest post procedure.  Length of time is 2-6 hours and dependent on access site used for procedure.  This bedrest is to allow proper clotting of the access site to prevent bleeding.    Patient advised to notify care team with any new COVID like symptoms prior to procedure. Day of procedure phone number: Melba at [phone]    Patient is aware of visitor policy.    Patient expresses understanding of above instructions and denies further questions at this time.      Haley Barnes RN  [facility]  [facility]

## 2024-09-30 NOTE — PROGRESS NOTES
Looks like labs were drawn before Malika's visit 10/4    Pls confirm Brayden's current meds as K remains low:  Bumex 2 mg BID  (1 mg tabs)  Losartan 12.5 (1/2 of a 25 mg tab)  KCl 40 BID (20 mEq tabs)    2.   Increase KCl to 40 mEq TID for now. When he sees Nephrology again, they may want to put him on spironolactone, so this dose will likely just be temporary.    Component      Latest Ref Rng 9/5/2024  8:40 AM 9/6/2024  10:08 AM 9/30/2024  3:27 PM   Sodium      135 - 145 mmol/L 140  141  139    Potassium      3.4 - 5.3 mmol/L 3.6  3.6  3.2 (L)    Carbon Dioxide (CO2)      22 - 29 mmol/L 27  27  27    Anion Gap      7 - 15 mmol/L 11  11  12    Urea Nitrogen      8.0 - 23.0 mg/dL 16.3  17.5  10.3    Creatinine      0.67 - 1.17 mg/dL 1.04  1.01  0.90    GFR Estimate      >60 mL/min/1.73m2 76  79  90    Calcium      8.8 - 10.4 mg/dL 9.1  9.4  8.6 (L)    Chloride      98 - 107 mmol/L 102  103  100    Glucose      70 - 99 mg/dL 104 (H)  148 (H)  122 (H)         Reza Rojas  September 30, 2024 at 6:17 PM

## 2024-10-01 ENCOUNTER — TELEPHONE (OUTPATIENT)
Dept: CARDIOLOGY | Facility: CLINIC | Age: 74
End: 2024-10-01

## 2024-10-01 DIAGNOSIS — N18.31 CHRONIC KIDNEY DISEASE (CKD) STAGE G3A/A1, MODERATELY DECREASED GLOMERULAR FILTRATION RATE (GFR) BETWEEN 45-59 ML/MIN/1.73 SQUARE METER AND ALBUMINURIA CREATININE RATIO LESS THAN 30 MG/G (H): Primary | ICD-10-CM

## 2024-10-01 NOTE — TELEPHONE ENCOUNTER
Patient called to review the below prep instructions for upcoming RAMIRO and no answer. Left VM to please return call to team 2 nurse line to review.

## 2024-10-01 NOTE — PROGRESS NOTES
Spoke to pt who is aware that his Potassium was low and has been taking the Potassium 40 mg twice a day. Pt asked to increase to 40 mg three times a day. Pt will be seeing Nephrology today at 1100 an have asked that pt contact the clinic as to any changes that were made.  Pt states understanding. Will make Crystal aware of this. Miguel Ángel

## 2024-10-02 NOTE — PROGRESS NOTES
Electrophysiology Clinic Progress Note    Mike Schultz MRN# [id number]   YOB: 1950 Age: 74 year old     Primary cardiology team: Dr. Day / Crystal Campos PA-C         Assessment and Plan     In summary, Mkie Schultz presents today for H&P prior to RAMIRO, R/LHC and coronary angiogram, per Dr. Muse, for evaluation of recent CVA and potential TAVR procedure.  These have both already been scheduled for Monday (in 3 days).  We will ask general anesthesia to be in attendance for RAMIRO.  We did discuss that the angiogram catheter will go through the aorta which is heavily calcified, and this does increase the risk for possible stroke with the procedure.  He will additionally be holding Xarelto 48h prior to the procedure (taking a full dose aspirin those days). He previously had a stroke while on Xarelto. He and his wife expressed understanding. I will double check that neurology is OK with this plan as well as his CVA was one month ago, and let Brayden know if this is not the case.    Risks and benefits of left heart catheterization and coronary angiogram were discussed with the patient in detail. 0.1-0.3% (for diagnostic angio) and 1-2% (for PCI)  risk of stroke, MI, death, emergent bypass for diagnostic angio, risk of contrast induced allergic reaction, renal dysfunction, vascular complications were discussed. Patient understands and wishes to proceed. The patient would be an appropriate candidate for DAPT, if required.     All risks and benefits for transesophageal echocardiogram have been explained to the patient.  This includes but is not limited to damage to the oral cavity, esophageal perforation, GI bleeding, pharyngeal hematoma, transient bronchospasm, transient hypoxia, arrhthymias (NSVT, transient atrial fibrillation), vomiting, hemoptysis, and complications from anesthesia. Patient understands and wishes to proceed with it. A formal consent form will be signed by the procedural  physician.      Malika Blanco PA-C  [facility]         History of Presenting Illness     Mike Schultz is a pleasant 74 year old patient with a pertinent history of the following -   Persistent atrial fibrillation.    First diagnosed in 2012.  Failed flecainide and noted scar on MRI.  PVI and SVC isolation 4/2017.  Repeat PVI and CTI for inducible flutter 4/2019.  Placed on amiodarone in 2020 for recurrent AF, undergoing multiple cardioversions from 2020 - 2023 for break-through AF typically related to ETOH use, but amio ultimately stopped 11/2023 due to discovery of pneumonitis.   Brought back to the EP lab by Dr. Day 5/2/2024 for repeat isolation of posterior LA with linear ablation, reisolation of reconnected PVI and CTI ablation for inducible flutter.    Recurrent AF with initiation of sotalol with cardioversion 7/2024 during prolonged hospitalization for HFpEF. Sotalol stopped after discharge due to prolonged QTc. Plan will likely be for AVNA/PPM with arrhythmia recurrence.   Chronically anticoagulated with Xarelto.   Myocardial scar on MRI 10/2017 consistent with prior MI in the left circumflex territory. Abnormal lexiscan stress test 5/2024 showing small area of apical ischemia as well as basal inferolateral infarct with mild iva-infarct ischemia. Coronary artery anatomy has not been defined.   At least moderate aortic stenosis. Evaluated recently by TAVR team Dr. Muse 9/12/24. /[facility] rec'd.  Recent M3/M4 CVA 9/2024, while compliant on Xarelto. Neuro felt this was cardioembolic. CT C/A/P negative for malignancy. Residual facial paralysis. Kept on Xarelto unchanged.  Pt seen by Dr. Day after this who added aspirin and rec'd consideration for watchman implantation. Subsequently seen by Dr. Muse who is unsure of benefit and rec'd further eval with RAMIRO.  HFpEF with recent prolonged hospitalization; admitted 7/25/2024, discharged 8/9/2024.  Diuresed with Bumex drip with assistance  from nephrology due to development of ROBIN - PTA spironolactone and chlorthalidone were discontinued.  Right heart catheterization 7/31/2024 showed moderately elevated right heart filling pressures and fairly normal left heart filling pressures with a wedge of 15. Discharge wt 294 lbs.   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 Followed by Dr. Mckeon. He does have supplemental oxygen, but uses it rarely.   Chronic renal disease. Followed by nephrology.   Hypertension  BOWEN, on CPAP  History of alcohol dependence.  Obesity  Hx of Bell's palsy    Today, I am meeting Brayden in clinic for his history and physical prior to RAMIRO and coronary angiogram, as recommended by Dr. Muse, scheduled for 10/7.  The RAMIRO is to rule out intracardiac thrombus with recent stroke.  The angiogram is part of evaluation for TAVR procedure for moderate-severe aortic stenosis per echo. Pending results, pt may be a candidate for future watchman or TAVR procedure. Of note, recent CTA did not suggest intracardiac thrombus. The aorta is severely calcified.     Brayden feels fine at rest but is short of breath with most activity.  This is despite his weight being down nearly 10 pounds from when he left the hospital, with minimal edema and clear lungs on exam.  He has some residual facial paralysis from his CVA.  Patient denies chest pain, shortness of breath, PND, orthopnea, edema, claudication, palpitations, near syncope or syncope. No bleeding issues or TIA/CVA sxs.  Labs on 9/30 showed a creatinine of 0.9, Estimated Creatinine Clearance: 96.1 mL/min (based on SCr of 0.9 mg/dL).  Potassium slightly low at 3.2.  His potassium supplement has been increased from 40 mEq twice a day, to 3 times daily.  proBNP within normal limits.  H&H within normal limits.  He has already been instructed by Dr. Muse's nurse to hold Xarelto for 48 hours prior to procedure, and take a full dose aspirin most days.         Review of Systems     12-pt ROS is  "negative except for as noted in the HPI.          Physical Exam     Vitals: BP (!) 141/87   Pulse 86   Ht 1.727 m (5' 8\")   Wt 129.7 kg (286 lb)   SpO2 93%   BMI 43.49 kg/m    Wt Readings from Last 10 Encounters:   10/04/24 129.7 kg (286 lb)   09/24/24 130.8 kg (288 lb 4.8 oz)   09/12/24 133.6 kg (294 lb 9.6 oz)   09/10/24 133.7 kg (294 lb 11.2 oz)   09/06/24 133.1 kg (293 lb 8 oz)   08/28/24 130.5 kg (287 lb 12.8 oz)   08/15/24 133.5 kg (294 lb 6.4 oz)   08/09/24 133.8 kg (294 lb 14.4 oz)   07/25/24 142 kg (313 lb 1.6 oz)   07/24/24 140.9 kg (310 lb 10.1 oz)       Constitutional:  Patient is pleasant, alert, cooperative, and in NAD.  HEENT:  NCAT. PERRLA. EOM's intact.   Neck:  CVP appears normal. No carotid bruits.   Pulmonary: Normal respiratory effort. CTAB.   Cardiac: RRR, normal S1/S2, no S3/S4, grade 3/6 ANDREZ at the RUSB.  Abdomen:  Non-tender abdomen, no hepatosplenomegaly appreciated.   Vascular: Pulses in the upper and lower extremities are 2+ and equal bilaterally.  Extremities: No edema, erythema, cyanosis or tenderness appreciated.  Skin:  No rashes or lesions appreciated.   Neurological:  No gross motor or sensory deficits.   Psych: Appropriate affect.          Data   Labs reviewed:  Recent Labs   Lab Test 09/30/24  1527 09/05/24  1138 09/04/24  1021 08/15/24  1433 11/24/23  1215 11/24/23  1215 07/27/23  0955 01/17/23  1234 10/18/22  0848 10/06/21  9702   LDL  --  115*  --   --   --   --  97  --   --  103*   HDL  --  35*  --   --   --   --  39*  --   --  38*   NHDL  --  152*  --   --   --   --  124  --   --  135*   CHOL  --  187  --   --   --   --  163  --   --  173   TRIG  --  187*  --   --   --   --  133  --   --  161*   TSH  --   --   --   --   --  2.67  --  2.53 4.33*  --    NTBNP 391  --  654 374   < > 274  --   --   --   --     < > = values in this interval not displayed.       Lab Results   Component Value Date    WBC 10.6 09/30/2024    WBC 9.8 09/21/2020    RBC 4.34 (L) 09/30/2024    RBC " 4.47 09/21/2020    HGB 13.7 09/30/2024    HGB 14.4 09/21/2020    HCT 42.5 09/30/2024    HCT 44.7 09/21/2020    MCV 98 09/30/2024     09/21/2020    MCH 31.6 09/30/2024    MCH 32.2 09/21/2020    MCHC 32.2 09/30/2024    MCHC 32.2 09/21/2020    RDW 14.7 09/30/2024    RDW 13.3 09/21/2020     09/30/2024     09/21/2020       Lab Results   Component Value Date     09/30/2024     06/30/2021    POTASSIUM 3.2 (L) 09/30/2024    POTASSIUM 4.6 12/17/2022    POTASSIUM 3.6 06/30/2021    CHLORIDE 100 09/30/2024    CHLORIDE 106 12/17/2022    CHLORIDE 109 06/30/2021    CO2 27 09/30/2024    CO2 30 12/17/2022    CO2 27 06/30/2021    ANIONGAP 12 09/30/2024    ANIONGAP 4 12/17/2022    ANIONGAP 5 06/30/2021     (H) 09/30/2024     (H) 09/06/2024     (H) 12/17/2022    GLC 89 06/30/2021    BUN 10.3 09/30/2024    BUN 15 12/17/2022    BUN 13 06/30/2021    CR 0.90 09/30/2024    CR 1.06 06/30/2021    GFRESTIMATED 90 09/30/2024    GFRESTIMATED 71 06/30/2021    GFRESTBLACK 82 06/30/2021    ASHELY 8.6 (L) 09/30/2024    ASHELY 8.7 06/30/2021      Lab Results   Component Value Date    AST 23 09/30/2024    AST 24 06/30/2021    ALT 16 09/30/2024    ALT 42 06/30/2021       Lab Results   Component Value Date    A1C 6.5 (H) 09/05/2024    A1C 5.7 (H) 02/25/2019       Lab Results   Component Value Date    INR 1.97 (H) 09/05/2024    INR 2.47 (H) 08/01/2024    INR 2.04 (H) 02/26/2019    INR 1.66 (H) 11/06/2017            Problem List     Patient Active Problem List   Diagnosis    Essential hypertension    Impotence of organic origin    Allergic rhinitis    Herpes simplex virus (HSV) infection    Class 3 obesity    Tobacco use disorder, moderate, in sustained remission    Atrial fibrillation, permanent (H)    Family history of ischemic heart disease    Gout    BOWEN (obstructive sleep apnea)    Ascending aorta dilatation (H)    Impaired fasting glucose    Aortic valve stenosis    Venous insufficiency    Bilateral  carpal tunnel syndrome    Other cardiomyopathies (H)    Leg wound, right    Lower extremity edema    Acute respiratory failure with hypoxia (H)    Acute on chronic congestive heart failure, unspecified heart failure type (H)    Acute decompensated heart failure (H)    Facial droop    Dysarthria    Acute CVA (cerebrovascular accident) (H)    Diabetes mellitus, type 2 (H)            Medications     Current Outpatient Medications   Medication Sig Dispense Refill    allopurinol (ZYLOPRIM) 300 MG tablet TAKE ONE TABLET (300 MG) BY MOUTH ONE TIME DAILY 90 tablet 0    atorvastatin (LIPITOR) 40 MG tablet Take 1 tablet (40 mg) by mouth every evening. 60 tablet 3    bumetanide (BUMEX) 1 MG tablet 2 mg am and 2 mg pm (Patient taking differently: Take 2 mg by mouth 2 times daily.) 360 tablet 1    cetirizine (ZYRTEC) 10 MG tablet Take 10 mg by mouth every evening      finasteride (PROPECIA) 1 MG tablet Take 1 tablet (1 mg) by mouth daily 90 tablet 2    losartan (COZAAR) 25 MG tablet Take 0.5 tablets (12.5 mg) by mouth daily. 45 tablet 2    miconazole (MICATIN) 2 % external powder Apply topically 2 times daily 85 g 0    potassium chloride pushpa ER (KLOR-CON M20) 20 MEQ CR tablet Take 2 tablets (40 mEq) by mouth 2 times daily. 360 tablet 2    psyllium (METAMUCIL/KONSYL) capsule Take 1-2 capsules by mouth daily      rivaroxaban ANTICOAGULANT (XARELTO ANTICOAGULANT) 20 MG TABS tablet Take 1 tablet (20 mg) by mouth daily (with dinner) 90 tablet 3            Past Medical History     Past Medical History:   Diagnosis Date    (HFpEF) heart failure with preserved ejection fraction (H)     Acute gouty arthritis     Alcohol use disorder in remission     Amiodarone pulmonary toxicity     Aortic stenosis     Basal cell carcinoma     Of the nose    Bell's palsy     Herpes simplex without mention of complication     Hyperlipidemia     Hypertension 2000    Persistent atrial fibrillation (H)     persistent, DCCV 12/2017, ablation 11/6/17     Pulmonary nodules     Sleep apnea     CPAP     Past Surgical History:   Procedure Laterality Date    ABLATION OF DYSRHYTHMIC FOCUS  2018, 04/12/2019    Procedure: EP Ablation Focal AFIB;  Surgeon: Fady Day MD;  Location:  HEART CARDIAC CATH LAB    ACHILLES TENDON SURGERY  1997    ANESTHESIA CARDIOVERSION N/A 2/26/2019    Procedure: ANESTHESIA CARDIOVERSION (CONNER);  Surgeon: GENERIC ANESTHESIA PROVIDER;  Location:  OR    ANESTHESIA CARDIOVERSION N/A 11/19/2020    Procedure: ANESTHESIA, FOR CARDIOVERSION (dr campbell);  Surgeon: GENERIC ANESTHESIA PROVIDER;  Location:  OR    ANESTHESIA CARDIOVERSION N/A 8/22/2022    Procedure: CARDIOVERSION;  Surgeon: GENERIC ANESTHESIA PROVIDER;  Location:  OR    ANESTHESIA CARDIOVERSION N/A 9/13/2023    Procedure: Anesthesia cardioversion;  Surgeon: GENERIC ANESTHESIA PROVIDER;  Location:  OR    ANESTHESIA CARDIOVERSION N/A 10/6/2023    Procedure: Anesthesia cardioversion;  Surgeon: GENERIC ANESTHESIA PROVIDER;  Location:  OR    ANESTHESIA CARDIOVERSION N/A 8/1/2024    Procedure: Anesthesia cardioversion;  Surgeon: GENERIC ANESTHESIA PROVIDER;  Location:  OR    BASAL CELL CARCINOMA EXCISION Right 2009    nose, took cartilage from inner ear.    CARDIOVERSION  2012, 2019    cardioversion for atr fib    COLONOSCOPY  2008    COLONOSCOPY N/A 3/22/2023    Procedure: COLONOSCOPY, FLEXIBLE, WITH LESION REMOVAL USING SNARE;  Surgeon: Anne Lucero MD;  Location:  GI    CV RIGHT HEART CATH MEASUREMENTS RECORDED N/A 7/31/2024    Procedure: Right Heart Cath;  Surgeon: Adriano Lazo MD;  Location:  HEART CARDIAC CATH LAB    EP ABLATION FOCAL AFIB N/A 4/12/2019    Procedure: EP Ablation Focal AFIB;  Surgeon: Fady Day MD;  Location:  HEART CARD[facility]AC CATH LAB    EP ABLATION FOCAL AFIB N/A 5/2/2024    Procedure: Ablation Atrial Fibrilation;  Surgeon: Fady Day MD;  Location:  HEART CARDIAC CATH LAB    HERNIA REPAIR, UMBILICAL  08/20/2012    Procedure:  HERNIORRHAPHY UMBILICAL;  UMBILICAL HERNIA REPAIR;  Surgeon: Ra Crocker MD;  Location: [facility]    HERNIORRHAPHY UMBILICAL  2012    Procedure: HERNIORRHAPHY UMBILICAL;  UMBILICAL HERNIA REPAIR;  Surgeon: Ra Crocker MD;  Location: [facility]    ORTHOPEDIC SURGERY      ORTHOPEDIC SURGERY      OTHER SURGICAL HISTORY Right 2017    total KNEE ARTHROSCOPY     TOTAL KNEE ARTHROPLASTY Left 2018    Z NONSPECIFIC PROCEDURE      achilles tendon    Z NONSPECIFIC PROCEDURE      knees, arthroscopy    ZZ NONSPECIFIC PROCEDURE      basal cell skin ca, nose    ZZC NONSPECIFIC PROCEDURE      flex sig; colonoscopy due 3/2008    ZZ NONSPECIFIC PROCEDURE      cardioversion for atr fib    ZZC TOTAL KNEE ARTHROPLASTY Left 2018    Dr. Malick Suarez     Family History   Problem Relation Age of Onset    Family History Negative Mother     Heart Disease Father         decesased at 42 - MI    Heart Disease Sister          MI    Lipids Sister     Lipids Sister     No Known Problems Sister     Heart Disease Brother          MI    Heart Disease Brother         CABG AT 49    Lung Cancer No family hx of      Social History     Socioeconomic History    Marital status: Single     Spouse name: Not on file    Number of children: Not on file    Years of education: Not on file    Highest education level: Not on file   Occupational History    Not on file   Tobacco Use    Smoking status: Former     Current packs/day: 0.00     Average packs/day: 1 pack/day for 34.6 years (34.6 ttl pk-yrs)     Types: Cigarettes     Start date:      Quit date: 2011     Years since quittin.1     Passive exposure: Past    Smokeless tobacco: Never    Tobacco comments:     quit    Vaping Use    Vaping status: Never Used   Substance and Sexual Activity    Alcohol use: Not Currently    Drug use: Never    Sexual activity: Not Currently     Partners: Female     Birth control/protection: None   Other Topics  Concern    Parent/sibling w/ CABG, MI or angioplasty before 65F 55M? 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     Social Determinants of Health     Financial Resource Strain: Not on file   Food Insecurity: Not on file   Transportation Needs: Not on file   Physical Activity: Not on file   Stress: Not on file   Social Connections: Not on file   Interpersonal Safety: Low Risk  (9/24/2024)    Interpersonal Safety     Do you feel physically and emotionally safe where you currently live?: Yes     Within the past 12 months, have you been hit, slapped, kicked or otherwise physically hurt by someone?: No     Within the past 12 months, have you been humiliated or emotionally abused in other ways by your partner or ex-partner?: No   Housing Stability: Not on file            Allergies   Amiodarone    Today's clinic visit entailed:  Review of the result(s) of each unique test - CTA, echo, right heart catheterization, stress test, BMP, proBNP  I spent a total of 50 minutes on the day of the visit.   Time spent by me doing chart review, history and exam, documentation and further activities per the note  Provider  Link to MDM Help Grid     The level of medical decision making during this visit was of high complexity.    The longitudinal plan of care for the diagnosis(es)/condition(s) as documented were addressed during this visit. Due to the added complexity in care, I will continue to support Brayden in the subsequent management and with ongoing continuity of care.

## 2024-10-02 NOTE — TELEPHONE ENCOUNTER
Per structural team, patient was prepped for both the RAMIRO and right and left cath on 10/7/2024.

## 2024-10-03 DIAGNOSIS — Z53.9 DIAGNOSIS NOT YET DEFINED: Primary | ICD-10-CM

## 2024-10-03 PROCEDURE — G0180 MD CERTIFICATION HHA PATIENT: HCPCS | Performed by: INTERNAL MEDICINE

## 2024-10-04 ENCOUNTER — TELEPHONE (OUTPATIENT)
Dept: CARDIOLOGY | Facility: CLINIC | Age: 74
End: 2024-10-04

## 2024-10-04 ENCOUNTER — CARE COORDINATION (OUTPATIENT)
Dept: CARDIOLOGY | Facility: CLINIC | Age: 74
End: 2024-10-04

## 2024-10-04 ENCOUNTER — OFFICE VISIT (OUTPATIENT)
Dept: CARDIOLOGY | Facility: CLINIC | Age: 74
End: 2024-10-04
Payer: COMMERCIAL

## 2024-10-04 VITALS
WEIGHT: 286 LBS | HEART RATE: 86 BPM | HEIGHT: 68 IN | OXYGEN SATURATION: 93 % | DIASTOLIC BLOOD PRESSURE: 87 MMHG | SYSTOLIC BLOOD PRESSURE: 141 MMHG | BODY MASS INDEX: 43.35 KG/M2

## 2024-10-04 DIAGNOSIS — I35.0 NONRHEUMATIC AORTIC VALVE STENOSIS: ICD-10-CM

## 2024-10-04 DIAGNOSIS — I63.9 ACUTE CVA (CEREBROVASCULAR ACCIDENT) (H): Primary | ICD-10-CM

## 2024-10-04 DIAGNOSIS — I48.0 PAROXYSMAL ATRIAL FIBRILLATION (H): ICD-10-CM

## 2024-10-04 PROCEDURE — 99215 OFFICE O/P EST HI 40 MIN: CPT | Performed by: PHYSICIAN ASSISTANT

## 2024-10-04 NOTE — LETTER
10/4/2024    Ranjan Courtney MD  [address] [facility] [address]    RE: Mike Schultz       Dear Colleague,     I had the pleasure of seeing Mike Schultz in the [facility].    Electrophysiology Clinic Progress Note    Mike Schultz MRN# [id number]   YOB: 1950 Age: 74 year old     Primary cardiology team: Dr. Day / Crystal Campos PA-C         Assessment and Plan     In summary, Mike Schultz presents today for H&P prior to RAMIRO, R/LHC and coronary angiogram, per Dr. Muse, for evaluation of recent CVA and potential TAVR procedure.  These have both already been scheduled for Monday (in 3 days).  We will ask general anesthesia to be in attendance for RAMIRO.  We did discuss that the angiogram catheter will go through the aorta which is heavily calcified, and this does increase the risk for possible stroke with the procedure.  He will additionally be holding Xarelto 48h prior to the procedure (taking a full dose aspirin those days).  He and his wife expressed understanding. I will double check that neurology is OK with this plan as well as his CVA was one month ago, and let Brayden know if this is not the case.    Risks and benefits of left heart catheterization and coronary angiogram were discussed with the patient in detail. 0.1-0.3% (for diagnostic angio) and 1-2% (for PCI)  risk of stroke, MI, death, emergent bypass for diagnostic angio, risk of contrast induced allergic reaction, renal dysfunction, vascular complications were discussed. Patient understands and wishes to proceed. The patient would be an appropriate candidate for DAPT, if required.     All risks and benefits for transesophageal echocardiogram have been explained to the patient.  This includes but is not limited to damage to the oral cavity, esophageal perforation, GI bleeding, pharyngeal hematoma, transient bronchospasm, transient hypoxia, arrhthymias (NSVT, transient atrial fibrillation), vomiting,  hemoptysis, and complications from anesthesia. Patient understands and wishes to proceed with it. A formal consent form will be signed by the procedural physician.      Malika Blanco PA-C  [facility] [place]         History of Presenting Illness     Mike Schultz is a pleasant 74 year old patient with a pertinent history of the following -   Persistent atrial fibrillation.    First diagnosed in 2012.  Failed flecainide and noted scar on MRI.  PVI and SVC isolation 4/2017.  Repeat PVI and CTI for inducible flutter 4/2019.  Placed on amiodarone in 2020 for recurrent AF, undergoing multiple cardioversions from 2020 - 2023 for break-through AF typically related to ETOH use, but amio ultimately stopped 11/2023 due to discovery of pneumonitis.   Brought back to the EP lab by Dr. aDy 5/2/2024 for repeat isolation of posterior LA with linear ablation, reisolation of reconnected PVI and CTI ablation for inducible flutter.    Recurrent AF with initiation of sotalol with cardioversion 7/2024 during prolonged hospitalization for HFpEF. Sotalol stopped after discharge due to prolonged QTc. Plan will likely be for AVNA/PPM with arrhythmia recurrence.   Chronically anticoagulated with Xarelto.   Myocardial scar on MRI 10/2017 consistent with prior MI in the left circumflex territory. Abnormal lexiscan stress test 5/2024 showing small area of apical ischemia as well as basal inferolateral infarct with mild iva-infarct ischemia. Coronary artery anatomy has not been defined.   At least moderate aortic stenosis. Evaluated by TAVR team Dr. Muse 9/12/24.  HFpEF with recent prolonged hospitalization; admitted 7/25/2024, discharged 8/9/2024.  Diuresed with Bumex drip with assistance from nephrology due to development of ROBIN - PTA spironolactone and chlorthalidone were discontinued.  Right heart catheterization 7/31/2024 showed moderately elevated right heart filling pressures and fairly normal left heart filling  pressures with a wedge of 15. Discharge wt 294 lbs.   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 Followed by Dr. Mckeon. He does have supplemental oxygen, but uses it rarely.   Recent M3/M4 CVA 9/2024, while compliant on Xarelto. Neuro felt this was cardioembolic. CT C/A/P negative for malignancy. Aspirin added. Residual facial paralysis.  Chronic renal disease. Followed by nephrology.   Hypertension  BOWEN, on CPAP  History of alcohol dependence.  Obesity  Hx of Bell's palsy    Today, I am meeting Brayden in clinic for his history and physical prior to RAMIRO and coronary angiogram, as recommended by Dr. Muse, scheduled for 10/7.  The RAMIRO is to rule out intracardiac thrombus with recent stroke.  The angiogram is part of evaluation for TAVR procedure for moderate-severe aortic stenosis per echo. Pending results, pt may be a candidate for future watchman or TAVR procedure. Of note, recent CTA did not suggest intracardiac thrombus. The aorta is severely calcified.     Brayden feels fine at rest but is short of breath with most activity.  This is despite his weight being down nearly 10 pounds from when he left the hospital, with minimal edema and clear lungs on exam.  He has some residual facial paralysis from his CVA.  Patient denies chest pain, shortness of breath, PND, orthopnea, edema, claudication, palpitations, near syncope or syncope. No bleeding issues or TIA/CVA sxs.  Labs on 9/30 showed a creatinine of 0.9, Estimated Creatinine Clearance: 96.1 mL/min (based on SCr of 0.9 mg/dL).  Potassium slightly low at 3.2.  His potassium supplement has been increased from 40 mEq twice a day, to 3 times daily.  proBNP within normal limits.  H&H within normal limits.  He has already been instructed by Dr. Muse's nurse to hold Xarelto for 48 hours prior to procedure, and take a full dose aspirin most days.         Review of Systems     12-pt ROS is negative except for as noted in the HPI.          Physical Exam  "    Vitals: BP (!) 141/87   Pulse 86   Ht 1.727 m (5' 8\")   Wt 129.7 kg (286 lb)   SpO2 93%   BMI 43.49 kg/m    Wt Readings from Last 10 Encounters:   10/04/24 129.7 kg (286 lb)   09/24/24 130.8 kg (288 lb 4.8 oz)   09/12/24 133.6 kg (294 lb 9.6 oz)   09/10/24 133.7 kg (294 lb 11.2 oz)   09/06/24 133.1 kg (293 lb 8 oz)   08/28/24 130.5 kg (287 lb 12.8 oz)   08/15/24 133.5 kg (294 lb 6.4 oz)   08/09/24 133.8 kg (294 lb 14.4 oz)   07/25/24 142 kg (313 lb 1.6 oz)   07/24/24 140.9 kg (310 lb 10.1 oz)       Constitutional:  Patient is pleasant, alert, cooperative, and in NAD.  HEENT:  NCAT. PERRLA. EOM's intact.   Neck:  CVP appears normal. No carotid bruits.   Pulmonary: Normal respiratory effort. CTAB.   Cardiac: RRR, normal S1/S2, no S3/S4, grade 3/6 ANDREZ at the RUSB.  Abdomen:  Non-tender abdomen, no hepatosplenomegaly appreciated.   Vascular: Pulses in the upper and lower extremities are 2+ and equal bilaterally.  Extremities: No edema, erythema, cyanosis or tenderness appreciated.  Skin:  No rashes or lesions appreciated.   Neurological:  No gross motor or sensory deficits.   Psych: Appropriate affect.          Data   Labs reviewed:  Recent Labs   Lab Test 09/30/24  1527 09/05/24  1138 09/04/24  1021 08/15/24  1433 11/24/23  1215 11/24/23  1215 07/27/23  0955 01/17/23  1234 10/18/22  0848 10/06/21  5362   LDL  --  115*  --   --   --   --  97  --   --  103*   HDL  --  35*  --   --   --   --  39*  --   --  38*   NHDL  --  152*  --   --   --   --  124  --   --  135*   CHOL  --  187  --   --   --   --  163  --   --  173   TRIG  --  187*  --   --   --   --  133  --   --  161*   TSH  --   --   --   --   --  2.67  --  2.53 4.33*  --    NTBNP 391  --  654 374   < > 274  --   --   --   --     < > = values in this interval not displayed.       Lab Results   Component Value Date    WBC 10.6 09/30/2024    WBC 9.8 09/21/2020    RBC 4.34 (L) 09/30/2024    RBC 4.47 09/21/2020    HGB 13.7 09/30/2024    HGB 14.4 09/21/2020    " HCT 42.5 09/30/2024    HCT 44.7 09/21/2020    MCV 98 09/30/2024     09/21/2020    MCH 31.6 09/30/2024    MCH 32.2 09/21/2020    MCHC 32.2 09/30/2024    MCHC 32.2 09/21/2020    RDW 14.7 09/30/2024    RDW 13.3 09/21/2020     09/30/2024     09/21/2020       Lab Results   Component Value Date     09/30/2024     06/30/2021    POTASSIUM 3.2 (L) 09/30/2024    POTASSIUM 4.6 12/17/2022    POTASSIUM 3.6 06/30/2021    CHLORIDE 100 09/30/2024    CHLORIDE 106 12/17/2022    CHLORIDE 109 06/30/2021    CO2 27 09/30/2024    CO2 30 12/17/2022    CO2 27 06/30/2021    ANIONGAP 12 09/30/2024    ANIONGAP 4 12/17/2022    ANIONGAP 5 06/30/2021     (H) 09/30/2024     (H) 09/06/2024     (H) 12/17/2022    GLC 89 06/30/2021    BUN 10.3 09/30/2024    BUN 15 12/17/2022    BUN 13 06/30/2021    CR 0.90 09/30/2024    CR 1.06 06/30/2021    GFRESTIMATED 90 09/30/2024    GFRESTIMATED 71 06/30/2021    GFRESTBLACK 82 06/30/2021    ASHELY 8.6 (L) 09/30/2024    ASHELY 8.7 06/30/2021      Lab Results   Component Value Date    AST 23 09/30/2024    AST 24 06/30/2021    ALT 16 09/30/2024    ALT 42 06/30/2021       Lab Results   Component Value Date    A1C 6.5 (H) 09/05/2024    A1C 5.7 (H) 02/25/2019       Lab Results   Component Value Date    INR 1.97 (H) 09/05/2024    INR 2.47 (H) 08/01/2024    INR 2.04 (H) 02/26/2019    INR 1.66 (H) 11/06/2017            Problem List     Patient Active Problem List   Diagnosis     Essential hypertension     Impotence of organic origin     Allergic rhinitis     Herpes simplex virus (HSV) infection     Class 3 obesity     Tobacco use disorder, moderate, in sustained remission     Atrial fibrillation, permanent (H)     Family history of ischemic heart disease     Gout     BOWEN (obstructive sleep apnea)     Ascending aorta dilatation (H)     Impaired fasting glucose     Aortic valve stenosis     Venous insufficiency     Bilateral carpal tunnel syndrome     Other cardiomyopathies  (H)     Leg wound, right     Lower extremity edema     Acute respiratory failure with hypoxia (H)     Acute on chronic congestive heart failure, unspecified heart failure type (H)     Acute decompensated heart failure (H)     Facial droop     Dysarthria     Acute CVA (cerebrovascular accident) (H)     Diabetes mellitus, type 2 (H)            Medications     Current Outpatient Medications   Medication Sig Dispense Refill     allopurinol (ZYLOPRIM) 300 MG tablet TAKE ONE TABLET (300 MG) BY MOUTH ONE TIME DAILY 90 tablet 0     atorvastatin (LIPITOR) 40 MG tablet Take 1 tablet (40 mg) by mouth every evening. 60 tablet 3     bumetanide (BUMEX) 1 MG tablet 2 mg am and 2 mg pm (Patient taking differently: Take 2 mg by mouth 2 times daily.) 360 tablet 1     cetirizine (ZYRTEC) 10 MG tablet Take 10 mg by mouth every evening       finasteride (PROPECIA) 1 MG tablet Take 1 tablet (1 mg) by mouth daily 90 tablet 2     losartan (COZAAR) 25 MG tablet Take 0.5 tablets (12.5 mg) by mouth daily. 45 tablet 2     miconazole (MICATIN) 2 % external powder Apply topically 2 times daily 85 g 0     potassium chloride pushpa ER (KLOR-CON M20) 20 MEQ CR tablet Take 2 tablets (40 mEq) by mouth 2 times daily. 360 tablet 2     psyllium (METAMUCIL/KONSYL) capsule Take 1-2 capsules by mouth daily       rivaroxaban ANTICOAGULANT (XARELTO ANTICOAGULANT) 20 MG TABS tablet Take 1 tablet (20 mg) by mouth daily (with dinner) 90 tablet 3            Past Medical History     Past Medical History:   Diagnosis Date     (HFpEF) heart failure with preserved ejection fraction (H)      Acute gouty arthritis      Alcohol use disorder in remission      Amiodarone pulmonary toxicity      Aortic stenosis      Basal cell carcinoma     Of the nose     Bell's palsy      Herpes simplex without mention of complication      Hyperlipidemia      Hypertension 2000     Persistent atrial fibrillation (H)     persistent, DCCV 12/2017, ablation 11/6/17     Pulmonary nodules       Sleep apnea     CPAP     Past Surgical History:   Procedure Laterality Date     ABLATION OF DYSRHYTHMIC FOCUS  2018, 04/12/2019    Procedure: EP Ablation Focal AFIB;  Surgeon: Fady Day MD;  Location:  HEART CARDIAC CATH LAB     ACHILLES TENDON SURGERY  1997     ANESTHESIA CARDIOVERSION N/A 2/26/2019    Procedure: ANESTHESIA CARDIOVERSION (CONNER);  Surgeon: GENERIC ANESTHESIA PROVIDER;  Location:  OR     ANESTHESIA CARDIOVERSION N/A 11/19/2020    Procedure: ANESTHESIA, FOR CARDIOVERSION (dr campbell);  Surgeon: GENERIC ANESTHESIA PROVIDER;  Location:  OR     ANESTHESIA CARDIOVERSION N/A 8/22/2022    Procedure: CARDIOVERSION;  Surgeon: GENERIC ANESTHESIA PROVIDER;  Location:  OR     ANESTHESIA CARDIOVERSION N/A 9/13/2023    Procedure: Anesthesia cardioversion;  Surgeon: GENERIC ANESTHESIA PROVIDER;  Location:  OR     ANESTHESIA CARDIOVERSION N/A 10/6/2023    Procedure: Anesthesia cardioversion;  Surgeon: GENERIC ANESTHESIA PROVIDER;  Location:  OR     ANESTHESIA CARDIOVERSION N/A 8/1/2024    Procedure: Anesthesia cardioversion;  Surgeon: GENERIC ANESTHESIA PROVIDER;  Location:  OR     BASAL CELL CARCINOMA EXCISION Right 2009    nose, took cartilage from inner ear.     CARDIOVERSION  2012, 2019    cardioversion for atr fib     COLONOSCOPY  2008     COLONOSCOPY N/A 3/22/2023    Procedure: COLONOSCOPY, FLEXIBLE, WITH LESION REMOVAL USING SNARE;  Surgeon: Anne Lucero MD;  Location:  GI     CV RIGHT HEART CATH MEASUREMENTS RECORDED N/A 7/31/2024    Procedure: Right Heart Cath;  Surgeon: Adriano Lazo MD;  Location:  HEART CARDIAC CATH LAB     EP ABLATION FOCAL AFIB N/A 4/12/2019    Procedure: EP Ablation Focal AFIB;  Surgeon: Fady Day MD;  Location:  HEART CARDIAC CATH LAB     EP ABLATION FOCAL AFIB N/A 5/2/2024    Procedure: Ablation Atrial Fibrilation;  Surgeon: Fady Day MD;  Location:  HEART CARDIAC CATH LAB     HERNIA REPAIR, UMBILICAL  08/20/2012    Procedure:  HERNIORRHAPHY UMBILICAL;  UMBILICAL HERNIA REPAIR;  Surgeon: Ra Crocker MD;  Location: [facility]     HERNIORRHAPHY UMBILICAL  2012    Procedure: HERNIORRHAPHY UMBILICAL;  UMBILICAL HERNIA REPAIR;  Surgeon: Ra Crocker MD;  Location: [facility]     ORTHOPEDIC SURGERY       ORTHOPEDIC SURGERY       OTHER SURGICAL HISTORY Right 2017    total KNEE ARTHROSCOPY      TOTAL KNEE ARTHROPLASTY Left 2018     Z NONSPECIFIC PROCEDURE      achilles tendon     Z NONSPECIFIC PROCEDURE      knees, arthroscopy     ZZ NONSPECIFIC PROCEDURE      basal cell skin ca, nose     ZZC NONSPECIFIC PROCEDURE      flex sig; colonoscopy due 3/2008     ZZ NONSPECIFIC PROCEDURE      cardioversion for atr fib     ZZC TOTAL KNEE ARTHROPLASTY Left 2018    Dr. Malick Suarez     Family History   Problem Relation Age of Onset     Family History Negative Mother      Heart Disease Father         decesased at 42 - MI     Heart Disease Sister          MI     Lipids Sister      Lipids Sister      No Known Problems Sister      Heart Disease Brother          MI     Heart Disease Brother         CABG AT 49     Lung Cancer No family hx of      Social History     Socioeconomic History     Marital status: Single     Spouse name: Not on file     Number of children: Not on file     Years of education: Not on file     Highest education level: Not on file   Occupational History     Not on file   Tobacco Use     Smoking status: Former     Current packs/day: 0.00     Average packs/day: 1 pack/day for 34.6 years (34.6 ttl pk-yrs)     Types: Cigarettes     Start date:      Quit date: 2011     Years since quittin.1     Passive exposure: Past     Smokeless tobacco: Never     Tobacco comments:     quit    Vaping Use     Vaping status: Never Used   Substance and Sexual Activity     Alcohol use: Not Currently     Drug use: Never     Sexual activity: Not Currently     Partners: Female     Birth  control/protection: None   Other Topics Concern     Parent/sibling w/ CABG, MI or angioplasty before 65F 55M? 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     Social Determinants of Health     Financial Resource Strain: Not on file   Food Insecurity: Not on file   Transportation Needs: Not on file   Physical Activity: Not on file   Stress: Not on file   Social Connections: Not on file   Interpersonal Safety: Low Risk  (9/24/2024)    Interpersonal Safety      Do you feel physically and emotionally safe where you currently live?: Yes      Within the past 12 months, have you been hit, slapped, kicked or otherwise physically hurt by someone?: No      Within the past 12 months, have you been humiliated or emotionally abused in other ways by your partner or ex-partner?: No   Housing Stability: Not on file            Allergies   Amiodarone    Today's clinic visit entailed:  Review of the result(s) of each unique test - CTA, echo, right heart catheterization, stress test, BMP, proBNP  I spent a total of 50 minutes on the day of the visit.   Time spent by me doing chart review, history and exam, documentation and further activities per the note  Provider  Link to [facility] Help Grid     The level of medical decision making during this visit was of high complexity.    The longitudinal plan of care for the diagnosis(es)/condition(s) as documented were addressed during this visit. Due to the added complexity in care, I will continue to support Brayden in the subsequent management and with ongoing continuity of care.      Thank you for allowing me to participate in the care of your patient.      Sincerely,     Malika Blanco PA-C     [facility]  cc:   Malika Blanco PA-C  [address] [place]  [place] [address]

## 2024-10-04 NOTE — PROGRESS NOTES
Hi team.  I met Brayden today for an H&P prior to RAMIRO and angiogram, ordered by Dr. Muse.  Procedures are scheduled for 10/7/2024.  I just want to make sure that we have clearance from neurology to go ahead with a coronary angiogram which would require a temporary Xarelto hold and some heparin administration in the setting of recent acute stroke on 9/5/2024.  Thank you!

## 2024-10-04 NOTE — TELEPHONE ENCOUNTER
Called and spoke with patient. We reviewed that due to IV fluid shortage his RAMIRO and angiogram for 10/7/24 need to be cancelled. Reviewed that I sent a message today to his neurology team and Dr. Muse today to see if/when he can safely hold xarelto to proceed with coronary angiogram. Patient states understanding and agreement.

## 2024-10-07 ENCOUNTER — TELEPHONE (OUTPATIENT)
Dept: CARDIOLOGY | Facility: CLINIC | Age: 74
End: 2024-10-07
Payer: COMMERCIAL

## 2024-10-07 NOTE — TELEPHONE ENCOUNTER
I reviewed with Dr. Muse today. Per Dr. Muse, RAMIRO can be cancelled as TAVR CT did not demonstrate any evidence of intracardiac thrombus. Patient is scheduled for an office visit with neurology on 11/5/24. Plan to postpone TAVR workup at this time to allow patient to recover from his stroke. Will have patient see neurology on 11/5/24.    Received message from Dr. Camara with from neurology that patient is ok to hold xarelto when his coronary angiogram gets rescheduled.         Called and reviewed plan with patient and his wife. Asked that they call in the meantime with any new or worsening symptoms. Patient states understanding and agreement with plan.

## 2024-10-10 ENCOUNTER — LAB (OUTPATIENT)
Dept: LAB | Facility: CLINIC | Age: 74
End: 2024-10-10
Payer: COMMERCIAL

## 2024-10-10 DIAGNOSIS — N18.31 CHRONIC KIDNEY DISEASE (CKD) STAGE G3A/A1, MODERATELY DECREASED GLOMERULAR FILTRATION RATE (GFR) BETWEEN 45-59 ML/MIN/1.73 SQUARE METER AND ALBUMINURIA CREATININE RATIO LESS THAN 30 MG/G (H): ICD-10-CM

## 2024-10-10 LAB
ALBUMIN SERPL BCG-MCNC: 3.9 G/DL (ref 3.5–5.2)
ANION GAP SERPL CALCULATED.3IONS-SCNC: 10 MMOL/L (ref 7–15)
BUN SERPL-MCNC: 12.1 MG/DL (ref 8–23)
CALCIUM SERPL-MCNC: 8.9 MG/DL (ref 8.8–10.4)
CHLORIDE SERPL-SCNC: 103 MMOL/L (ref 98–107)
CREAT SERPL-MCNC: 0.98 MG/DL (ref 0.67–1.17)
EGFRCR SERPLBLD CKD-EPI 2021: 81 ML/MIN/1.73M2
GLUCOSE SERPL-MCNC: 105 MG/DL (ref 70–99)
HCO3 SERPL-SCNC: 26 MMOL/L (ref 22–29)
PHOSPHATE SERPL-MCNC: 3.1 MG/DL (ref 2.5–4.5)
POTASSIUM SERPL-SCNC: 3.9 MMOL/L (ref 3.4–5.3)
SODIUM SERPL-SCNC: 139 MMOL/L (ref 135–145)

## 2024-10-10 PROCEDURE — 36415 COLL VENOUS BLD VENIPUNCTURE: CPT

## 2024-10-10 PROCEDURE — 80069 RENAL FUNCTION PANEL: CPT

## 2024-10-13 NOTE — PROGRESS NOTES
Medication Therapy Management (MTM) Encounter    ASSESSMENT:                            Medication Adherence/Access: No issues identified.    Acute infarct posterior right frontal lobe:  Improving with patient and OT--see neurology for f/up 11-5-24.       Chronic HFpEF:  Moderate to severe aortic stenosis-Needs TAVR:  Daily home weight pretty stable, urine color good, on2 x day high dose potassium replacement --with goal of >4, current 3.9--nephrology consult later today .          ROBIN due to cardiorenal syndrome and diuretic use:  Resolved --scr wnl now.        Persistent atrial fibrillation  Long term current use of anticoagulation:  Continue xarelto 20mg./day with dinner.      Prolonged QT    - Monitor electrolytes and keep K >4 and mag >2     History of pneumonitis  10-31-24 seeing lung md.-6 minute walk /scan etc.   Aortic valve is biggest issue causing his breathing issues.      Chronic stable medical conditions  BOWEN  History of alcohol use with intermittent spells of sobriety  Bell's palsy (right side, about 2 years ago)  Lung nodules  BPH  Severe obesity-he'd like to lose 50lbs.       Gout  Stable. Patient is at goal of uric acid <6mg/dl.      Finasteride 1mg./day for hair x10-15 years . Feels it helps.        Type 2 Diabetes   New diagnosis 9-5-24.A1c 6.5%.  Wants to lose 50 lbs. He is unknown about diabetes.   Discuss med options with dr. Courtney. Wants to lose 50lbs.   Mtm will send info on ozempic or mounjaro to help with wt.loss.        PLAN:                              1. Stay on all current medications that we reviewed --working well for you.    2. It looks like your 9-5-24 A1c is 6.5% which indicated Type-2 diabetes and your current home weight is 281lbs --you desire to lose 50lbs. to reverse Diabetes as well as take pressure off heart etc.   Please discuss the diabetic weekly injectable medications with Dr. Courtney --ozempic or mounjaro if affordable would be very good options for you .         Follow-up: with Dr. Courtney and heart doctors as needed.           SUBJECTIVE/OBJECTIVE:                          Brayden Schultz is a 74 year old male seen for a transitions of care visit. He was discharged from [facility] on 9-6-24 for  Acute CVA (cerebrovascular accident) (H)  Facial droop  Dysarthria  Essential hypertension  Acute decompensated heart failure (H)     Reason for visit:   Stroke recovery /med review.   Working on mouth and eye per OT --showing improvement.   Tavr needed-- needs RAMIRO and angiogram but will see neurology 11-5-24. To determine why he had the stroke?    Allergies/ADRs: Reviewed in chart  Past Medical History: Reviewed in chart  Tobacco: He reports that he quit smoking about 13 years ago. His smoking use included cigarettes. He started smoking about 47 years ago. He has a 34.6 pack-year smoking history. He has been exposed to tobacco smoke. He has never used smokeless tobacco.  Alcohol: not currently using  Other Substance Use: none  E-cigarette Use: none  Caffeine: none   Personal Healthcare Goals: get breathing back in check --he exerts himself loses his breath. Get a-fib /HF under control, lose weight -50lbs.   Activity: physical therapy and has ellipitical machine and treadmill and stationary bike on his own.    Medication Adherence/Access: no issues reported.        Acute infarct posterior right frontal lobe  *Did not receive thrombolytics due to PTA anticoagulation  - Admit to Neuro floor  - Stroke Neurology consultation  - Brain MRI w/wo showed acute/subacute appearing infarct in the posterior right frontal lobe  -CT angio head and neck does not show any large vessel occlusion.  - Neurochecks and vital signs q4h  - Goal SBP <180 per stroke neuro  - Statin: Atorvastatin 40 daily--admits can only tolerate 1/2 tab or 20mg./day now.  Recent Labs   Lab Test 09/05/24  1138 07/27/23  0955   CHOL 187 163   HDL 35* 39*   * 97   TRIG 187* 133   9-5-24 lab  results are prior to stroke.    - Telemetry, EKG  - ECHO +/- bubble done.  No intracardiac thrombus.  - Bedside Glucose Monitoring  - Lipid panel shows , non-   - hemoglobin A1c 6.5--new onset type-2 diabetes --he was unaware of this but states he'd loike to lose 50lbs.   - PT/OT/SLP .  Cleared for regular diet  -Stroke neurology recommended obtaining CTA angiogram of the heart to evaluate for thrombus.  Can consider outpatient RAMIRO if CTA cannot be done today.  -CT chest abdomen pelvis with contrast with no acute findings other than aortic stenosis   - Stroke Education  - Euthermia, Euglycemia  -Continue chronic anticoagulation with rivaroxaban 20 mg daily     Chronic HFpEF  Moderate to severe aortic stenosis  Acute on chronic hypoxic respiratory failure (uses 1-2 L at HS to clam him down)  *Follows with [facility] cardiology. Recently admitted at [facility] 7/25/24-8/9/24 for acute on chronic HFpEF requiring Bumex drip and assistance from both cardiology and nephrology. Diuresis was noted to be challenging due to episodes of hypotension and ROBIN. Right heart cath 7/31/24 showed moderate elevated right heart filling pressures. Discharged on Bumex 3 mg BID.  *He was noted to have worsening ROBIN on 8/21 and his PTA bumex, chlorthalidone, spironolactone, losartan were all held. With improvement of his renal function he was restarted on lower doses of bumex 2 mg BID(8am and 2pm) and losartan 12.5 mg daily 8/27.  *Current weight 294 lbs, stable from discharge 8/9 but he states he was down to around 287 lbs at home 10-15-24= 281 lbs.- falling slowly from 287lbs.  Color urine=yellow to light.   *Currently saturating 94% on 2 L O2. He uses 1-2 L O2 at HS and has a prescription for O2 PRN, but is typically on RA during the day.  -proBNP within normal limits.  Chest x-ray shows no significant interval change.  Lungs clear.  No effusions.   -Increased prior to admission Bumex from 2 in the morning and 1 in the  afternoon to 2 mg twice daily.  - Hold PTA Losartan, Spironolactone, Chlorthalidone to allow room for permissive HTN in the setting of acute CVA  -Restarted losartan at the time of discharge.  Discontinued spironolactone and chlorthalidone since they were already held prior to this admission.  - Cardiology consult.  Agree with above.  Patient does have an outpatient follow-up to discuss TAVR for his aortic stenosis.  - Strict I&O  - Daily weights  - Low sodium diet, 2 L fluid restriction once cleared for diet by SLP  Takes 20meq tabs potassium chloride - 3 tabs 2 x day now.     Potassium   Date Value Ref Range Status   10/10/2024 3.9 3.4 - 5.3 mmol/L Final   12/17/2022 4.6 3.4 - 5.3 mmol/L Final   06/30/2021 3.6 3.4 - 5.3 mmol/L Final                  ROBIN due to cardiorenal syndrome and diuretic use, resolved  *Creatinine peaked at 3.26 on 8/21, and his PTA bumex, chlorthalidone, spironolactone, losartan were all held. With improvement of his renal function his bumex and losartan were restarted 8/27 at lower doses.  *Creatinine currently improved down to 1.04. He has a history of wide fluctuations in renal function but baseline creatinine appears to be around 1.5. Follows with nephrology.  - Holding PTA Losartan, Spironolactone, Chlorthalidone as noted above given his acute CVA  -On Bumex 2 mg twice daily.  Continue daily BMP.  -Avoid nephrotoxic medications.  Strict I's and O's.     Last Comprehensive Metabolic Panel:  Sodium   Date Value Ref Range Status   10/10/2024 139 135 - 145 mmol/L Final   06/30/2021 141 133 - 144 mmol/L Final     Potassium   Date Value Ref Range Status   10/10/2024 3.9 3.4 - 5.3 mmol/L Final   12/17/2022 4.6 3.4 - 5.3 mmol/L Final   06/30/2021 3.6 3.4 - 5.3 mmol/L Final     Chloride   Date Value Ref Range Status   10/10/2024 103 98 - 107 mmol/L Final   12/17/2022 106 94 - 109 mmol/L Final   06/30/2021 109 94 - 109 mmol/L Final     Carbon Dioxide   Date Value Ref Range Status   06/30/2021 27  20 - 32 mmol/L Final     Carbon Dioxide (CO2)   Date Value Ref Range Status   10/10/2024 26 22 - 29 mmol/L Final   12/17/2022 30 20 - 32 mmol/L Final     Anion Gap   Date Value Ref Range Status   10/10/2024 10 7 - 15 mmol/L Final   12/17/2022 4 3 - 14 mmol/L Final   06/30/2021 5 3 - 14 mmol/L Final     Glucose   Date Value Ref Range Status   10/10/2024 105 (H) 70 - 99 mg/dL Final   12/17/2022 105 (H) 70 - 99 mg/dL Final   06/30/2021 89 70 - 99 mg/dL Final     GLUCOSE BY METER POCT   Date Value Ref Range Status   09/06/2024 123 (H) 70 - 99 mg/dL Final     Urea Nitrogen   Date Value Ref Range Status   10/10/2024 12.1 8.0 - 23.0 mg/dL Final   12/17/2022 15 7 - 30 mg/dL Final   06/30/2021 13 7 - 30 mg/dL Final     Creatinine   Date Value Ref Range Status   10/10/2024 0.98 0.67 - 1.17 mg/dL Final   06/30/2021 1.06 0.66 - 1.25 mg/dL Final     GFR Estimate   Date Value Ref Range Status   10/10/2024 81 >60 mL/min/1.73m2 Final     Comment:     eGFR calculated using 2021 CKD-EPI equation.   06/30/2021 71 >60 mL/min/[1.73_m2] Final     Comment:     Non  GFR Calc  Starting 12/18/2018, serum creatinine based estimated GFR (eGFR) will be   calculated using the Chronic Kidney Disease Epidemiology Collaboration   (CKD-EPI) equation.       Calcium   Date Value Ref Range Status   10/10/2024 8.9 8.8 - 10.4 mg/dL Final     Comment:     Reference intervals for this test were updated on 7/16/2024 to reflect our healthy population more accurately. There may be differences in the flagging of prior results with similar values performed with this method. Those prior results can be interpreted in the context of the updated reference intervals.   06/30/2021 8.7 8.5 - 10.1 mg/dL Final     Bilirubin Total   Date Value Ref Range Status   09/30/2024 0.5 <=1.2 mg/dL Final   06/30/2021 0.6 0.2 - 1.3 mg/dL Final     Alkaline Phosphatase   Date Value Ref Range Status   09/30/2024 116 40 - 150 U/L Final   06/30/2021 87 40 - 150 U/L  Final     ALT   Date Value Ref Range Status   09/30/2024 16 0 - 70 U/L Final   06/30/2021 42 0 - 70 U/L Final     AST   Date Value Ref Range Status   09/30/2024 23 0 - 45 U/L Final   06/30/2021 24 0 - 45 U/L Final            Persistent atrial fibrillation  Long term current use of anticoagulation  *Amiodarone discontinued 11/2023 due to pneumonitis and he has failed Flecainide in the past.  *Follows with Dr. Day. He has a history of multiple ablations and cardioversions in the past which have subsequently failed. Most recent cardioversion 7/25/24 and currently in SR  *He has hx nocturnal bradycardia and pauses  - Holding PTA Sotalol given acute CVA  - Continue PTA Xarelto--gets free from seth 90 tabs at a time.  -  cardiology recommended continuing to hold sotalol after discharge 1 day follow-up with the EP.  It can be restarted under their supervision.     Prolonged QT  *QTc today is 543 ms, was ranging 500-530 last hospitalization. Follows with EP. His PTA Metoprolol was switched to Sotalol last admission.  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 Follow up CT 03/2024 showed bilateral interstitial infiltrates with likely component of fibrosis. He was hospitalized 05/2024 with multiple issues including relapsed inflammatory lung process and put on another prolonged steroid taper by pulmonology.  *Seen by pulmonology as inpatient 8/9/24 with no concern for ongoing pneumonitis contributing to his dyspnea and his steroids were discontinued.  - Follow up with pulmonology as outpatient for PFTs and repeat CT      10-31-24 seeing lung md.-6 minute walk /scan etc.   Aortic valve is biggest issue causing his breathing issues.      Chronic stable medical conditions  BOWEN  History of alcohol use with intermittent spells of sobriety  Bell's  palsy (right side, about 2 years ago)  Lung nodules  BPH  Severe obesity-he'd like to lose 50lbs.        Past Gout issues:    allopurinol 300mg.- /day   Uric Acid   Date Value Ref Range Status   07/27/2023 5.8 3.4 - 7.0 mg/dL Final   09/21/2020 5.5 3.5 - 7.2 mg/dL Final         Finasteride 1mg./day for hair x10-15 years . Feels it helps.        Type 2 Diabetes   New diagnosis 9-5-24.  Wants to lose 50 lbs. He is unknown about diabetes.   Discuss med options with dr. Courtney. Wants to lose 50lbs.   Lab Results   Component Value Date    A1C 6.5 09/05/2024    A1C 5.7 02/25/2019    A1C 5.7 10/12/2017    A1C 5.8 12/23/2014    A1C 5.5 11/06/2013    A1C 5.3 09/26/2006           ----------------  Post Discharge Medication Reconciliation Status: discharge medications reconciled, continue medications without change.    I spent 40 minutes with this patient today. All changes were made via collaborative practice agreement with Rnajan Courtney MD. A copy of the visit note was provided to the patient's provider(s).    A summary of these recommendations was sent via [email or web address].    Vanessa Ellis [facility].  Medication Therapy Management Provider  [phone]      Telemedicine Visit Details  Type of service:  Telephone visit  Start Time: 10:30 AM  End Time: 11 :10AM     Medication Therapy Recommendations  Diabetes mellitus, type 2 (H)    Rationale: Untreated condition - Needs additional medication therapy - Indication   Recommendation: Start Medication - Ozempic (0.25 or 0.5 MG/DOSE) 2 MG/1.5ML Sopn - discuss glp-1 meds with pcp.   Status: Contact Provider - Awaiting Response   Note: mounjaro ....

## 2024-10-15 ENCOUNTER — VIRTUAL VISIT (OUTPATIENT)
Dept: PHARMACY | Facility: CLINIC | Age: 74
End: 2024-10-15

## 2024-10-15 DIAGNOSIS — E66.813 CLASS 3 OBESITY: ICD-10-CM

## 2024-10-15 DIAGNOSIS — I50.9 ACUTE DECOMPENSATED HEART FAILURE (H): ICD-10-CM

## 2024-10-15 DIAGNOSIS — E11.9 TYPE 2 DIABETES MELLITUS WITHOUT COMPLICATION, WITHOUT LONG-TERM CURRENT USE OF INSULIN (H): ICD-10-CM

## 2024-10-15 DIAGNOSIS — I63.9 ACUTE CVA (CEREBROVASCULAR ACCIDENT) (H): Primary | ICD-10-CM

## 2024-10-15 DIAGNOSIS — I77.810 ASCENDING AORTA DILATATION (H): ICD-10-CM

## 2024-10-15 DIAGNOSIS — E87.6 HYPOKALEMIA: ICD-10-CM

## 2024-10-15 DIAGNOSIS — M10.9 GOUT OF FOOT, UNSPECIFIED CAUSE, UNSPECIFIED CHRONICITY, UNSPECIFIED LATERALITY: ICD-10-CM

## 2024-10-15 DIAGNOSIS — I48.21 ATRIAL FIBRILLATION, PERMANENT (H): ICD-10-CM

## 2024-10-15 DIAGNOSIS — I10 BENIGN ESSENTIAL HYPERTENSION: ICD-10-CM

## 2024-10-15 PROCEDURE — 99207 PR NO CHARGE LOS: CPT | Mod: 93 | Performed by: PHARMACIST

## 2024-10-15 RX ORDER — ATORVASTATIN CALCIUM 40 MG/1
20 TABLET, FILM COATED ORAL EVERY EVENING
COMMUNITY
Start: 2024-10-15 | End: 2024-11-06

## 2024-10-15 RX ORDER — POTASSIUM CHLORIDE 1500 MG/1
60 TABLET, EXTENDED RELEASE ORAL 2 TIMES DAILY
COMMUNITY
Start: 2024-10-15 | End: 2024-11-06

## 2024-10-15 NOTE — PATIENT INSTRUCTIONS
"Recommendations from today's MTM visit:                                                    MTM (medication therapy management) is a service provided by a clinical pharmacist designed to help you get the most of out of your medicines.   Today we reviewed what your medicines are for, how to know if they are working, that your medicines are safe and how to make your medicine regimen as easy as possible.      1. Stay on all current medications that we reviewed --working well for you.    2. It looks like your 9-5-24 A1c is 6.5% which indicated Type-2 diabetes and your current home weight is 281lbs --you desire to lose 50lbs. to reverse Diabetes as well as take pressure off heart etc.   Please discuss the diabetic weekly injectable medications with Dr. Courtney --ozempdevaughn or mounjaro if affordable would be very good options for you .        Follow-up: with Dr. Courtney and heart doctors as needed.     It was great speaking with you today.  I value your experience and would be very thankful for your time in providing feedback in our clinic survey. In the next few days, you may receive an email or text message from [employer] with a link to a survey related to your  clinical pharmacist.\"     To schedule another MTM appointment, please call the clinic directly or you may call the MTM scheduling line at [phone] or toll-free at [phone].     My Clinical Pharmacist's contact information:                                                      Please feel free to contact me with any questions or concerns you have.      Vanessa Ellsi [facility].  Medication Therapy Management Provider  [phone]    "

## 2024-10-15 NOTE — Clinical Note
Dr. Courtney; This patient was referred for a MTM visit after their recent hospital discharge as part of [facility]'s transitions of care work. I reviewed jazzy's medications-working well for him --he looks to be new type-2 DM and overweight --he'd like to discuss glp-1 drugs like Ozempic or Mounjaro with you as he desires to lose 50lbs.  Please see my note for more details and contact me with any questions. Thank you , Vanessa Ellis [facility]. Medication Therapy Management Provider [phone]

## 2024-10-16 ENCOUNTER — MEDICAL CORRESPONDENCE (OUTPATIENT)
Dept: HEALTH INFORMATION MANAGEMENT | Facility: CLINIC | Age: 74
End: 2024-10-16
Payer: COMMERCIAL

## 2024-10-16 ENCOUNTER — TELEPHONE (OUTPATIENT)
Dept: INTERNAL MEDICINE | Facility: CLINIC | Age: 74
End: 2024-10-16
Payer: COMMERCIAL

## 2024-10-16 DIAGNOSIS — I63.9 ACUTE CVA (CEREBROVASCULAR ACCIDENT) (H): Primary | ICD-10-CM

## 2024-10-16 NOTE — TELEPHONE ENCOUNTER
Dana Speech Therapist from [facility] will be discharging patient from home care speech therapy. Requesting an order for outpatient speech therapy at [facility] per patient's request.

## 2024-10-17 ENCOUNTER — DOCUMENTATION ONLY (OUTPATIENT)
Dept: INTERNAL MEDICINE | Facility: CLINIC | Age: 74
End: 2024-10-17
Payer: COMMERCIAL

## 2024-10-17 DIAGNOSIS — J96.11 CHRONIC RESPIRATORY FAILURE WITH HYPOXIA (H): Primary | ICD-10-CM

## 2024-10-18 ENCOUNTER — THERAPY VISIT (OUTPATIENT)
Dept: SPEECH THERAPY | Facility: CLINIC | Age: 74
End: 2024-10-18
Attending: INTERNAL MEDICINE
Payer: COMMERCIAL

## 2024-10-18 DIAGNOSIS — I63.9 ACUTE CVA (CEREBROVASCULAR ACCIDENT) (H): ICD-10-CM

## 2024-10-18 DIAGNOSIS — R47.1 DYSARTHRIA: Primary | ICD-10-CM

## 2024-10-18 PROCEDURE — 92522 EVALUATE SPEECH PRODUCTION: CPT | Mod: GN | Performed by: STUDENT IN AN ORGANIZED HEALTH CARE EDUCATION/TRAINING PROGRAM

## 2024-10-18 NOTE — PROGRESS NOTES
SPEECH LANGUAGE PATHOLOGY EVALUATION        Fall Risk Screen:  Have you fallen 2 or more times in the past year?: No  Have you fallen and had an injury in the past year?: No  Is patient a fall risk?: No    Subjective      Presenting condition or subjective complaint: Speach therapy    73yo male with PMH significant for right-sided Bell's Palsy two years ago who was admitted to [facility] with dysarthria and left facial droop from 9/5/24-9/6/24.  Imaging was significant for acute infarct of the posterior right frontal lobe.  Pt was discharged home and has been seeing home health SLP for facial paralysis and dysarthria.  He is being discharged from home health services next week, and presents today to establish ongoing SLP intervention in the outpatient setting.  Pt reports ongoing left-sided facial droop.  His speech is still a little slurred and tends to sound worse in the mornings.  He needs to talk on the phone for work, but notes people can understand him well most of the time.  Date of onset: 09/05/24    Relevant medical history: Heart problems   Dates & types of surgery: Knee    Prior diagnostic imaging/testing results:       Prior therapy history for the same diagnosis, illness or injury: No      PAST MEDICAL HISTORY:   Past Medical History:   Diagnosis Date    (HFpEF) heart failure with preserved ejection fraction (H)     Acute gouty arthritis     Alcohol use disorder in remission     Amiodarone pulmonary toxicity     Aortic stenosis     Basal cell carcinoma     Of the nose    Bell's palsy     Herpes simplex without mention of complication     Hyperlipidemia     Hypertension 2000    Persistent atrial fibrillation (H)     persistent, DCCV 12/2017, ablation 11/6/17    Pulmonary nodules     Sleep apnea     CPAP       PAST SURGICAL HISTORY:   Past Surgical History:   Procedure Laterality Date    ABLATION OF DYSRHYTHMIC FOCUS  2018, 04/12/2019    Procedure: EP Ablation Focal AFIB;  Surgeon: Fady Day MD;  Location:   HEART CARDIAC CATH LAB    ACHILLES TENDON SURGERY  1997    ANESTHESIA CARDIOVERSION N/A 2/26/2019    Procedure: ANESTHESIA CARDIOVERSION (CONNER);  Surgeon: GENERIC ANESTHESIA PROVIDER;  Location:  OR    ANESTHESIA CARDIOVERSION N/A 11/19/2020    Procedure: ANESTHESIA, FOR CARDIOVERSION (dr campbell);  Surgeon: GENERIC ANESTHESIA PROVIDER;  Location:  OR    ANESTHESIA CARDIOVERSION N/A 8/22/2022    Procedure: CARDIOVERSION;  Surgeon: GENERIC ANESTHESIA PROVIDER;  Location:  OR    ANESTHESIA CARDIOVERSION N/A 9/13/2023    Procedure: Anesthesia cardioversion;  Surgeon: GENERIC ANESTHESIA PROVIDER;  Location:  OR    ANESTHESIA CARDIOVERSION N/A 10/6/2023    Procedure: Anesthesia cardioversion;  Surgeon: GENERIC ANESTHESIA PROVIDER;  Location:  OR    ANESTHESIA CARDIOVERSION N/A 8/1/2024    Procedure: Anesthesia cardioversion;  Surgeon: GENERIC ANESTHESIA PROVIDER;  Location:  OR    BASAL CELL CARCINOMA EXCISION Right 2009    nose, took cartilage from inner ear.    CARDIOVERSION  2012, 2019    cardioversion for atr fib    COLONOSCOPY  2008    COLONOSCOPY N/A 3/22/2023    Procedure: COLONOSCOPY, FLEXIBLE, WITH LESION REMOVAL USING SNARE;  Surgeon: Anne Lucero MD;  Location:  GI    CV RIGHT HEART CATH MEASUREMENTS RECORDED N/A 7/31/2024    Procedure: Right Heart Cath;  Surgeon: Adriano Lazo MD;  Location:  HEART CARDIAC CATH LAB    EP ABLATION FOCAL AFIB N/A 4/12/2019    Procedure: EP Ablation Focal AFIB;  Surgeon: Fady Day MD;  Location:  HEART CARDIAC CATH LAB    EP ABLATION FOCAL AFIB N/A 5/2/2024    Procedure: Ablation Atrial Fibrilation;  Surgeon: Fady Day MD;  Location:  HEART CARDIAC CATH LAB    HERNIA REPAIR, UMBILICAL  08/20/2012    Procedure: HERNIORRHAPHY UMBILICAL;  UMBILICAL HERNIA REPAIR;  Surgeon: Ra Crocker MD;  Location:  SD    HERNIORRHAPHY UMBILICAL  8/20/2012    Procedure: HERNIORRHAPHY UMBILICAL;  UMBILICAL HERNIA REPAIR;  Surgeon: Obi  Ra Breaux MD;  Location: [facility]    ORTHOPEDIC SURGERY      ORTHOPEDIC SURGERY      OTHER SURGICAL HISTORY Right 2017    total KNEE ARTHROSCOPY     TOTAL KNEE ARTHROPLASTY Left 2018    ZZC NONSPECIFIC PROCEDURE      achilles tendon    ZZC NONSPECIFIC PROCEDURE      knees, arthroscopy    ZZC NONSPECIFIC PROCEDURE      basal cell skin ca, nose    ZZC NONSPECIFIC PROCEDURE      flex sig; colonoscopy due 3/2008    ZZC NONSPECIFIC PROCEDURE      cardioversion for atr fib    ZZC TOTAL KNEE ARTHROPLASTY Left 2018    Dr. Malick Suarez       SOCIAL HISTORY:   Social History     Tobacco Use    Smoking status: Former     Current packs/day: 0.00     Average packs/day: 1 pack/day for 34.6 years (34.6 ttl pk-yrs)     Types: Cigarettes     Start date:      Quit date: 2011     Years since quittin.1     Passive exposure: Past    Smokeless tobacco: Never    Tobacco comments:     quit    Substance Use Topics    Alcohol use: Not Currently       Living Environment  Social support: With a significant other or spouse   Help at home:    Equipment owned: Walker; Raised toilet seat; Bath bench     Employment: Yes Insurance  Hobbies/Interests: Rumgr    Patient goals for therapy:      Pain assessment: Pain denied     Objective     ORAL MOTOR FUNCTION:  anomalies present  Dentition: natural dentition  Secretion management: WFL  Mucosal quality: good  Mandibular function: intact, some left-sided clicking without pain or reduction in ROM  Oral labial function: impaired retraction, impaired strength on left, left-sided facial asymmetry  Lingual function: impaired protrusion, impaired left lateral movement, impaired strength on left  Velar function: intact   Buccal function: impaired, low tone on left  Laryngeal function: voicing WFL with mild glottal duran    MOTOR SPEECH:  Speech Intelligibility:     Word level speech intelligibility: minimal impairment, Apraxia Battery: Sounds=10/10, Word  Repetition=10/10, Increasing Word Length=10/10, Words Repetition=8/10; AIDS Word Intelligibility=98%.       Phrase/sentence level speech intelligibility: minimal impairment, Apraxia Battery: Repeat Sentences=4/5. AIDS Sentence Intelligibility=96%.       Conversation level speech intelligibility: minimal impairment, % intelligible   Respiration Observations:  intermittently poor respiratory/phonatory coordination    Phonation: mild intermittent glottal duran   Maximum phonation time (seconds): 23.4 seconds with pitch instability  S/Z ratio (___ sec/ ___sec): 0.68  (>1.4 is suggestive of VF pathology)   Resonance: WNL  Rate/prosody: WNL   Articulation: imprecise articulation, especially with /s/ production   Articulatory rate of movement (number of repetitions in 5 seconds): WFL  Sequential motion rates (number of repetitions in 5 seconds): WFL to minimal intermittent irregular rhythm  Repetition Skills:  intact  Speech Fluency: WNL   Dysarthria differential diagnosis: Flaccid/LMN   Motor speech level of impairment: minimal impairment     Assessment & Plan   CLINICAL IMPRESSIONS   Medical Diagnosis: CVA    Treatment Diagnosis: Dysarthria   Impression/Assessment: Pt is a 74 year old male with speech and facial asymmetry complaints. The following significant findings have been identified:  minimal flaccid dysarthria and mild-moderate left facial asymmetry , characterized by imprecise articulation especially of /s/, intermittently poor respiratory/phonatory coordination, and glottal duran. Identified deficits interfere with their ability to communicate within the home or community as compared to previous level of function.    PLAN OF CARE  Treatment Interventions:  Evaluation and treatment with SLP specialized in facial paralysis.  Referral entered for MD mendieta.  No treatment is currently planned at this clinic.    Recommended Referrals to Other Professionals: Speech Language Pathology specialized in facial  paralysis  Education Assessment:   Learner/Method: Patient;Listening;Reading;No Barriers to Learning  Education Comments: SLP provided education regarding evaluation findings and recommendation to see SLP specialized in facial paralysis treatment. SLP provided written handout of strategies to improve speech intelligibility.  Pt verbalized understanding of education and proposed referral.    Risks and benefits of evaluation/treatment have been explained.   Patient/Family/caregiver agrees with Plan of Care.     Evaluation Time:    Sound production (artic, phonology, apraxia, dysarthria) Minutes (80503): 25    Signing Clinician: Briana Kuo, SLP    JORDON Salmeron (music) MJONATAN, CCC-SLP  Speech-Language Pathologist  [facility] Certificate of Vocology  [facility]  [phone]

## 2024-10-20 ENCOUNTER — HEALTH MAINTENANCE LETTER (OUTPATIENT)
Age: 74
End: 2024-10-20

## 2024-10-21 ENCOUNTER — MEDICAL CORRESPONDENCE (OUTPATIENT)
Dept: HEALTH INFORMATION MANAGEMENT | Facility: CLINIC | Age: 74
End: 2024-10-21
Payer: COMMERCIAL

## 2024-10-23 ENCOUNTER — MEDICAL CORRESPONDENCE (OUTPATIENT)
Dept: HEALTH INFORMATION MANAGEMENT | Facility: CLINIC | Age: 74
End: 2024-10-23
Payer: COMMERCIAL

## 2024-10-31 ENCOUNTER — OFFICE VISIT (OUTPATIENT)
Dept: PULMONOLOGY | Facility: CLINIC | Age: 74
End: 2024-10-31
Payer: COMMERCIAL

## 2024-10-31 ENCOUNTER — ANCILLARY PROCEDURE (OUTPATIENT)
Dept: CT IMAGING | Facility: CLINIC | Age: 74
End: 2024-10-31
Attending: INTERNAL MEDICINE
Payer: COMMERCIAL

## 2024-10-31 ENCOUNTER — OFFICE VISIT (OUTPATIENT)
Dept: PULMONOLOGY | Facility: CLINIC | Age: 74
End: 2024-10-31
Attending: INTERNAL MEDICINE
Payer: COMMERCIAL

## 2024-10-31 VITALS
HEART RATE: 87 BPM | SYSTOLIC BLOOD PRESSURE: 170 MMHG | WEIGHT: 284 LBS | DIASTOLIC BLOOD PRESSURE: 90 MMHG | BODY MASS INDEX: 43.04 KG/M2 | OXYGEN SATURATION: 92 % | HEIGHT: 68 IN

## 2024-10-31 DIAGNOSIS — J96.01 ACUTE RESPIRATORY FAILURE WITH HYPOXIA (H): ICD-10-CM

## 2024-10-31 DIAGNOSIS — J84.9 ILD (INTERSTITIAL LUNG DISEASE) (H): Primary | ICD-10-CM

## 2024-10-31 LAB
6 MIN WALK (FT): 960 FT
6 MIN WALK (M): 293 M
FIO2-PRE: 36 %

## 2024-10-31 PROCEDURE — 94729 DIFFUSING CAPACITY: CPT | Performed by: INTERNAL MEDICINE

## 2024-10-31 PROCEDURE — 94618 PULMONARY STRESS TESTING: CPT | Performed by: INTERNAL MEDICINE

## 2024-10-31 PROCEDURE — 94150 VITAL CAPACITY TEST: CPT | Performed by: INTERNAL MEDICINE

## 2024-10-31 PROCEDURE — 94726 PLETHYSMOGRAPHY LUNG VOLUMES: CPT | Performed by: INTERNAL MEDICINE

## 2024-10-31 PROCEDURE — 71250 CT THORAX DX C-: CPT | Performed by: RADIOLOGY

## 2024-10-31 PROCEDURE — 99207 PR NO CHARGE LOS: CPT

## 2024-10-31 PROCEDURE — 94375 RESPIRATORY FLOW VOLUME LOOP: CPT | Performed by: INTERNAL MEDICINE

## 2024-10-31 PROCEDURE — 99213 OFFICE O/P EST LOW 20 MIN: CPT | Performed by: INTERNAL MEDICINE

## 2024-10-31 ASSESSMENT — PAIN SCALES - GENERAL: PAINLEVEL_OUTOF10: NO PAIN (0)

## 2024-10-31 NOTE — LETTER
"10/31/2024      Mike Schultz  [address] [facility] [address]      Dear Colleague,    Thank you for referring your patient, Mike Schultz, to the [facility] FOR LUNG SCIENCE AND [facility]. Please see a copy of my visit note below.    [facility]  Pulmonary Medicine  Visit Clinic Note  October 31, 2024    Dear patient. Thank you for visiting with me. I want you to feel respected, understood, and empowered. \"Respect\" is valuing you as much as I would a close family member. \"Empowerment\" happens when you are fully informed, and can make the best possible decision for you.  Please ask me questions!  Challenge anything that is not clear.       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  Possiblly AFOP or organizing pneumonia as per MDD.  - Patient was on amiodarone in 2023 when he had pneumonitis but later in May 2024 he was not on amiodarne and still; has exacerbation.  -Possible AFOP.   - Will treat him with low dose prednisone before he goes for a procedure as it may worsen his disease.  - Will follow up in 3 months with pfts.      RTC in 3 months with pfts.       60 minutes excluding the time spent on cigarette cessation was  spent on the date of the encounter doing chart review, history and exam, documentation and further activities as noted above.    The longitudinal plan of care for the diagnosis(es)/condition(s) as documented were addressed during this visit. Due to the added complexity in care, I will continue to support Brayden in the " subsequent management and with ongoing continuity of care.    These conclusions are made at the best of one's knowledge and belief based on the provided evidence such as patient's history and allergy test results and they can change over time or can be incomplete because of missing information's.    I explained the lab values, imagings and findings to the patient.  Patient expressed understanding I did not recognize any barriers to the understanding of the patient.    The above note was dictated using voice recognition software and may include typographical errors. Please contact the author for any clarifications.    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     Patient was started on steroids again in May2024 and was given extensded 3 months long prednisoen taper. He has been off of prednisone since setp 2024 and his Ct chest in October 2024 showed significant improvement in lung infiltrates.    -His course in last few months of 2024 has been complicated by  stroke, severe aortic stenosis needing a possible valvular procedure.     He has nt smoked since 2011. Total pack years of 34   -Lives in [place] and wife is very focused on keeping it clean.  - He has a desk job.   - No recreational drug use.   - No hot tube. NO mould probem in house.   - No pets.   -No family histroy of autoimmune disease.          Medications:     Current  "Outpatient Medications   Medication Sig Dispense Refill     allopurinol (ZYLOPRIM) 300 MG tablet TAKE ONE TABLET (300 MG) BY MOUTH ONE TIME DAILY 90 tablet 0     atorvastatin (LIPITOR) 40 MG tablet Take 0.5 tablets (20 mg) by mouth every evening.       bumetanide (BUMEX) 1 MG tablet 2 mg am and 2 mg pm (Patient taking differently: Take 2 mg by mouth 2 times daily.) 360 tablet 1     cetirizine (ZYRTEC) 10 MG tablet Take 10 mg by mouth every evening       finasteride (PROPECIA) 1 MG tablet Take 1 tablet (1 mg) by mouth daily 90 tablet 2     losartan (COZAAR) 25 MG tablet Take 0.5 tablets (12.5 mg) by mouth daily. 45 tablet 2     potassium chloride pushpa ER (KLOR-CON M20) 20 MEQ CR tablet Take 60 mEq by mouth 2 times daily.       Psyllium 30 % POWD Take 1 Scoop by mouth twice a week.       rivaroxaban ANTICOAGULANT (XARELTO ANTICOAGULANT) 20 MG TABS tablet Take 1 tablet (20 mg) by mouth daily (with dinner) 90 tablet 3     No current facility-administered medications for this visit.            Review of Systems:       A complete 10 point review of systems was otherwise negative except as noted in the HPI.        PHYSICAL EXAM:  BP (!) 170/90   Pulse 87   Ht 1.727 m (5' 8\")   Wt 128.8 kg (284 lb)   SpO2 92%   BMI 43.18 kg/m       General: Well developed, well nourished, No apparent distress  Eyes: Anicteric  Nose: Nasal mucosa with no edema or hyperemia.  No polyps  Ears: Hearing grossly normal  Mouth: Oral mucosa is moist, without any lesions. No oropharyngeal exudate.  Respiratory: Good air movement. No crackles. No rhonchi. No wheezes  Cardiac: RRR, normal S1, S2. No murmurs. No JVD  Abdomen: Soft, NT/ND  Musculoskeletal: Extremities normal. No clubbing. No cyanosis. No edema.  Skin: No rash on limited exam  Neuro: Normal mentation. Normal speech.  Psych:Normal affect           Data:   All laboratory and imaging data reviewed.      PFT:       PFT Interpretation:  I personally reviewed and interpreted the " PFTs.    CXR: I personally reviewed and interpreted the chest x-ray    Chest CT: I personally reviewed and interpreted the CT scan.    5/2024       10/2024    Recent Results (from the past week)   6 minute walk test    Collection Time: 10/31/24 12:00 AM   Result Value Ref Range    6 min walk (FT) 960 1,069 ft    6 Min Walk (M) 293 326 m   General PFT Lab (Please always keep checked)    Collection Time: 10/31/24  8:00 AM   Result Value Ref Range    FIO2-Pre 36.00 %                                           Again, thank you for allowing me to participate in the care of your patient.        Sincerely,        Vinayak Valles MD

## 2024-10-31 NOTE — PROGRESS NOTES
"[facility]  Pulmonary Medicine  Visit Clinic Note  October 31, 2024    Dear patient. Thank you for visiting with me. I want you to feel respected, understood, and empowered. \"Respect\" is valuing you as much as I would a close family member. \"Empowerment\" happens when you are fully informed, and can make the best possible decision for you.  Please ask me questions!  Challenge anything that is not clear.       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  Possiblly AFOP or organizing pneumonia as per MDD.  - Patient was on amiodarone in 2023 when he had pneumonitis but later in May 2024 he was not on amiodarne and still; has exacerbation.  -Possible AFOP.   - Will treat him with low dose prednisone before he goes for a procedure as it may worsen his disease.  - Will follow up in 3 months with pfts.      RTC in 3 months with pfts.       60 minutes excluding the time spent on cigarette cessation was  spent on the date of the encounter doing chart review, history and exam, documentation and further activities as noted above.    The longitudinal plan of care for the diagnosis(es)/condition(s) as documented were addressed during this visit. Due to the added complexity in care, I will continue to support Brayden in the subsequent management and with ongoing continuity of care.    These conclusions are made at the best of one's knowledge and belief based on the provided evidence such as patient's history and allergy test results and they can change over time or can be incomplete because of missing " information's.    I explained the lab values, imagings and findings to the patient.  Patient expressed understanding I did not recognize any barriers to the understanding of the patient.    The above note was dictated using voice recognition software and may include typographical errors. Please contact the author for any clarifications.    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     Patient was started on steroids again in May2024 and was given extensded 3 months long prednisoen taper. He has been off of prednisone since setp 2024 and his Ct chest in October 2024 showed significant improvement in lung infiltrates.    -His course in last few months of 2024 has been complicated by  stroke, severe aortic stenosis needing a possible valvular procedure.     He has nt smoked since 2011. Total pack years of 34   -Lives in [place] and wife is very focused on keeping it clean.  - He has a desk job.   - No recreational drug use.   - No hot tube. NO mould probem in house.   - No pets.   -No family histroy of autoimmune disease.          Medications:     Current Outpatient Medications   Medication Sig Dispense Refill    allopurinol (ZYLOPRIM) 300 MG tablet TAKE ONE TABLET (300 MG) BY MOUTH ONE TIME DAILY 90 tablet 0    atorvastatin (LIPITOR) 40 MG tablet Take 0.5 tablets (20 mg) by mouth every evening.      bumetanide (BUMEX) 1 MG tablet 2 mg  "am and 2 mg pm (Patient taking differently: Take 2 mg by mouth 2 times daily.) 360 tablet 1    cetirizine (ZYRTEC) 10 MG tablet Take 10 mg by mouth every evening      finasteride (PROPECIA) 1 MG tablet Take 1 tablet (1 mg) by mouth daily 90 tablet 2    losartan (COZAAR) 25 MG tablet Take 0.5 tablets (12.5 mg) by mouth daily. 45 tablet 2    potassium chloride pushpa ER (KLOR-CON M20) 20 MEQ CR tablet Take 60 mEq by mouth 2 times daily.      Psyllium 30 % POWD Take 1 Scoop by mouth twice a week.      rivaroxaban ANTICOAGULANT (XARELTO ANTICOAGULANT) 20 MG TABS tablet Take 1 tablet (20 mg) by mouth daily (with dinner) 90 tablet 3     No current facility-administered medications for this visit.            Review of Systems:       A complete 10 point review of systems was otherwise negative except as noted in the HPI.        PHYSICAL EXAM:  BP (!) 170/90   Pulse 87   Ht 1.727 m (5' 8\")   Wt 128.8 kg (284 lb)   SpO2 92%   BMI 43.18 kg/m       General: Well developed, well nourished, No apparent distress  Eyes: Anicteric  Nose: Nasal mucosa with no edema or hyperemia.  No polyps  Ears: Hearing grossly normal  Mouth: Oral mucosa is moist, without any lesions. No oropharyngeal exudate.  Respiratory: Good air movement. No crackles. No rhonchi. No wheezes  Cardiac: RRR, normal S1, S2. No murmurs. No JVD  Abdomen: Soft, NT/ND  Musculoskeletal: Extremities normal. No clubbing. No cyanosis. No edema.  Skin: No rash on limited exam  Neuro: Normal mentation. Normal speech.  Psych:Normal affect           Data:   All laboratory and imaging data reviewed.      PFT:       PFT Interpretation:  I personally reviewed and interpreted the PFTs.    CXR: I personally reviewed and interpreted the chest x-ray    Chest CT: I personally reviewed and interpreted the CT scan.    5/2024       10/2024    Recent Results (from the past week)   6 minute walk test    Collection Time: 10/31/24 12:00 AM   Result Value Ref Range    6 min walk (FT) 960 " 1,069 ft    6 Min Walk (M) 293 326 m   General PFT Lab (Please always keep checked)    Collection Time: 10/31/24  8:00 AM   Result Value Ref Range    FIO2-Pre 36.00 %

## 2024-10-31 NOTE — NURSING NOTE
Chief Complaint   Patient presents with    RECHECK     Return Interstitial Lung     Medications reviewed and vital signs taken.   Christy Santos, CMA

## 2024-11-01 ENCOUNTER — LAB (OUTPATIENT)
Dept: LAB | Facility: CLINIC | Age: 74
End: 2024-11-01
Payer: COMMERCIAL

## 2024-11-01 ENCOUNTER — MYC REFILL (OUTPATIENT)
Dept: INTERNAL MEDICINE | Facility: CLINIC | Age: 74
End: 2024-11-01

## 2024-11-01 DIAGNOSIS — J84.9 ILD (INTERSTITIAL LUNG DISEASE) (H): ICD-10-CM

## 2024-11-01 DIAGNOSIS — I50.9 ACUTE DECOMPENSATED HEART FAILURE (H): ICD-10-CM

## 2024-11-01 DIAGNOSIS — E87.6 HYPOKALEMIA: ICD-10-CM

## 2024-11-01 DIAGNOSIS — I10 BENIGN ESSENTIAL HYPERTENSION: ICD-10-CM

## 2024-11-01 LAB — ERYTHROCYTE [SEDIMENTATION RATE] IN BLOOD BY WESTERGREN METHOD: 12 MM/HR (ref 0–20)

## 2024-11-01 PROCEDURE — 85652 RBC SED RATE AUTOMATED: CPT

## 2024-11-01 PROCEDURE — 86140 C-REACTIVE PROTEIN: CPT

## 2024-11-01 PROCEDURE — 36415 COLL VENOUS BLD VENIPUNCTURE: CPT

## 2024-11-01 NOTE — TELEPHONE ENCOUNTER
Clinic RN: Please investigate patient's chart or contact patient if the information cannot be found because the medication is listed as historical or discontinued. Confirm patient is taking this medication. Document findings and route refill encounter to provider for approval or denial.  Natalie Gomez RN, BSN  [facility]

## 2024-11-02 LAB — CRP SERPL-MCNC: 6.08 MG/L

## 2024-11-04 ENCOUNTER — TEAM CONFERENCE (OUTPATIENT)
Dept: PULMONOLOGY | Facility: CLINIC | Age: 74
End: 2024-11-04
Payer: COMMERCIAL

## 2024-11-04 LAB
DLCOUNC-%PRED-PRE: 70 %
DLCOUNC-PRE: 16.86 ML/MIN/MMHG
DLCOUNC-PRED: 23.95 ML/MIN/MMHG
ERV-%PRED-PRE: 72 %
ERV-PRE: 0.96 L
ERV-PRED: 1.31 L
EXPTIME-PRE: 8.6 SEC
FEF2575-%PRED-PRE: 108 %
FEF2575-PRE: 2.26 L/SEC
FEF2575-PRED: 2.08 L/SEC
FEFMAX-%PRED-PRE: 96 %
FEFMAX-PRE: 7.32 L/SEC
FEFMAX-PRED: 7.59 L/SEC
FEV1-%PRED-PRE: 97 %
FEV1-PRE: 2.64 L
FEV1FEV6-PRE: 78 %
FEV1FEV6-PRED: 77 %
FEV1FVC-PRE: 78 %
FEV1FVC-PRED: 77 %
FEV1SVC-PRE: 80 %
FEV1SVC-PRED: 70 %
FIFMAX-PRE: 3.94 L/SEC
FRCPLETH-%PRED-PRE: 77 %
FRCPLETH-PRE: 2.81 L
FRCPLETH-PRED: 3.61 L
FVC-%PRED-PRE: 95 %
FVC-PRE: 3.4 L
FVC-PRED: 3.55 L
IC-%PRED-PRE: 88 %
IC-PRE: 2.33 L
IC-PRED: 2.62 L
RVPLETH-%PRED-PRE: 70 %
RVPLETH-PRE: 1.85 L
RVPLETH-PRED: 2.64 L
TLCPLETH-%PRED-PRE: 76 %
TLCPLETH-PRE: 5.14 L
TLCPLETH-PRED: 6.72 L
VA-%PRED-PRE: 81 %
VA-PRE: 4.86 L
VC-%PRED-PRE: 84 %
VC-PRE: 3.28 L
VC-PRED: 3.9 L

## 2024-11-04 NOTE — PROGRESS NOTES
Neurology Consultation    Patient Name:  Mike Schultz  MRN:  [id number]    :  1950  Date of Service:  2024  Primary care provider:  Ranjan Courtney        Chief Complaint: Hospital follow-up for stroke     History of Present Illness:     Mike Schultz is a 74 year old male who presents for hospital follow-up for stroke.     He feels like he is about 60% back to baseline.  Speech and facial weakness improved although sometimes speech will get slurred. Will sometimes have difficulty remembering people's names.  Still has some numbness/weakness in left hand.      Being worked up for TAVR.  Has not had RAMIRO/Watchman device - cardiology wanting neuro input. CT angiogram no signs of thrombus.     Date of stroke:2024  Symptoms: Left facial weakness, left hand numbness/weakness, and dysarthria   Location of stroke: R frontal, distal M3/M4  Etiology of stroke: Possibly cardioembolic although patient taking Xarelto regularly - no missed doses - for atrial fibrillation.   CHADSVASC2 if patient has a fib:   PFO/Watchman candidate? (If <60): Working with cardiology-consideration of Watchman   CEA/Carotid stenting candidate?: No  Aspirin?: No - on Xarelto   Hx of aspirin failure?: No  Requiring anticoagulation?: Yes, on Xarelto   Smoker/smoking cessation? Denies tobacco use   Cholesterol:  - start on atorvastatin 40 mg - takes 20 mg as it made him more short of breath to take higher dose?  PT/OT/SLP needs: Working with speech therapy   Sleep apnea?: +Sleep apnea, uses CPAP, plugged in with sleep clinic   Diet: Discussed Mediterranean diet, he has tried to loose weight with diet modifications. Going to talk to PCP about weight loss   Diabetes/control: A1c 6.5%  Current modified baljeet scale: 1-2      Stroke Summary per Inpatient Stroke Team   MRI/Head CT Acute/subacute appearing infarct in the posterior right frontal  lobe. No evidence of hemorrhagic transformation   Intracranial  Vasculature Mild-to-moderate stenoses of the cavernous and supraclinoid  ICAs from atherosclerotic disease, . Moderate stenosis at the  origin of the left vertebral artery.   Cervical Vasculature No LVO   CT perfusion images suggests possible bandlike area of  ischemia/infarct in the posterior right frontal lobe region. This  would likely be of a distal right M3/M4 vessel which is not well  evaluated by this CTA.  Echocardiogram EF 55-60%, no regional wall motion abnormalities,moderate to severe aortic stenosis, normal left atrial size    EKG/Telemetry NSR w/ 1st degree AV block      LDL  9/5/2024: 115 mg/dL   A1C  9/5/2024: 6.5 %   Troponin 9/5/2024: 32 ng/L    - CT chest/abd/pelvis: No evidence of malignancy     Past Medical History:  - Hyperlipidemia, persistent atrial fibrillation (on Xarelto, s/p multiple ablations), moderate to severe aortic stenosis, alcohol use disorder (in remission), sleep apnea, hypertension, history of Bell's palsy (right side, approximately 2 years ago), HfPEF, chronic kidney disease     Social History:  Drinks socially/rarely.  Denies recreational drug use.   Sell insurance - back to work.    Denies tobacco use     Allergies:  Allergies   Allergen Reactions    Amiodarone Shortness Of Breath     Suspected amiodarone toxicity involving lungs       Physical Exam:  /76 (BP Location: Left arm, Patient Position: Sitting, Cuff Size: Adult Large)   Pulse 86     General:  No acute distress  Cardiovascular: Normal rate.  Lung: Respirations are non-labored.  Extremities: Normal range of motion  Integumentary: Warm and dry    Neurologic:  Mental status : alert, fund of knowledge appropriate for visit    LANGUAGE: Speech fluent, mild dysarthria     CN:  II- pupils equal and reactive, visual fields full  III, IV, VI- extraocular movements intact  V- sensation intact bilaterally  VII- Facial weakness R side and L side   VIII- hearing intact, no nystagmus  IX, X- palate elevates  symmetrically  XI- sternocleidomastoid 5/5  XII- tongue midline    MOTOR : intact bulk and tone  5/5 strength except right shoulder abduction weakness 4-    SENSORY : Intact to temp in BUE and BLE, reduced vib sensation in toes, pp intact in BUE and BLE     REFLEXES:       Right Left   Triceps 2 2   Biceps 2 2   Brachioradialis 2 2   Knee jerk 2 2   Ankle jerk 0 0   Dodge absent   Toes down going     CEREBELLUM: finger to nose, finger taps, and heel to shin intact bilaterally     GAIT : Wider based waddling gait, slight limp on R    Cognition and Speech: Speech clear and coherent.    Psychiatric: Cooperative, Appropriate mood & affect.     Assessment/Plan:     Mike Schultz is a 74 year old year old male who presents for follow-up concerning stroke.      Right frontal stroke   - Etiology: Based on the location of the stroke, I would suspect that this is cardioembolic although can not say for certain and would be unusual to have a cardioembolic stroke while being compliant with Xarelto. Cardiology is considering a RAMIRO and Watchman device. I'd like to discuss this patient's case with my stroke neurology colleagues.    - Continue Xarelto for secondary stroke prevention   - Encourage Mediterranean diet, low sodium and sugar consumption  - Continue speech therapy   - Reveiwed stroke warning signs     Hypertension  - Goal BP is <130/80 with tighter control associated with improved vascular outcomes.   - Recommend home blood pressure monitoring and keeping a BP log for primary care follow up      Hyperlipidemia  -  (goal 40-70); continue Lipitor 20 mg with ongoing outpatient titration to achieve LDL goal   - Recheck LDL in 1 month (3 months from initiation of Lipitor), ordered     Diabetes   - Recent A1c was 6.5%, (goal <7% for secondary stroke prevention), follow-up with PCP    BOWEN  - Continue nightly use of CPAP  - Reviewed that untreated sleep apnea is an independent risk factors for stroke    Right arm  weakness  - Patient had isolated right shoulder abduction (deltoid) weakness.  Patient was unaware of it until I noticed it on exam.  He is uncertain when it started.  Denies pain or numbness associated.  I am unsure what the etiology of this would be. Unlikely to be vascular related as he has no other symptoms and unusual for isolated deltoid weakness.  He did mention having shortness of breath with atorvastatin 40 mg (not at 20 mg) and considered statin induced myopathy but this does not fit this presentation.  Will send him to PT.    Follow-up in 3 months     Spoke to Dr. Rush in stroke neurology concerning this patient's case.  No studies to suggest that Watchman is superior DOAC.  Does have other sources for embolism, including intracranial and cervical vasculature atherosclerosis as well significant atherosclerosis of the aortic valve. Could consider adding ASA 81 mg or switching DOAC to warfarin.     I spent 70 minutes providing care for this patient, including reviewing imaging, labs, and notes as well as providing counseling and coordination of care for the above issues.

## 2024-11-04 NOTE — TELEPHONE ENCOUNTER
Called and spoke with pt. Pt stated he takes:   atorvastatin (LIPITOR) 40 MG tablet -- -- 10/15/2024 -- No   Sig - Route: Take 0.5 tablets (20 mg) by mouth every evening. - Oral      Disp Refills Start End KARON   potassium chloride pushpa ER (KLOR-CON M20) 20 MEQ CR tablet -- -- 10/15/2024 -- No   Sig - Route: Take 60 mEq by mouth 2 times daily. - Oral     Routing to PCP to review and advise.

## 2024-11-04 NOTE — PROGRESS NOTES
ILD Conference      Patient Name: Mike Schultz    Reason for conference discussion/Specific Question:  New consult    Radiology Interpretation: CT scan Nov 2023- Ground glass opacities, liver is not hyperdense, does not look like amiodarone toxicity.   Review of other scans, show waxing and wanning infiltrates. Differential Acute fibrinous and organizing pneumonia (AFOB)  vs organizing pneumonia vs less likely amiodarone toxicity. Myrna Almeida MD (radiology)      There was a consensus recommendation for the following actions:     Organizing pneumonia vs AFOB. Plan to start on Prednisone. Concern he could flare after TAVR, watch closely after.     Pulmonary/ILD Provider: Vinayak Valles MD (pulm)

## 2024-11-05 ENCOUNTER — VIRTUAL VISIT (OUTPATIENT)
Dept: INTERNAL MEDICINE | Facility: CLINIC | Age: 74
End: 2024-11-05
Payer: COMMERCIAL

## 2024-11-05 ENCOUNTER — OFFICE VISIT (OUTPATIENT)
Dept: NEUROLOGY | Facility: CLINIC | Age: 74
End: 2024-11-05
Attending: PSYCHIATRY & NEUROLOGY
Payer: COMMERCIAL

## 2024-11-05 ENCOUNTER — MYC MEDICAL ADVICE (OUTPATIENT)
Dept: INTERNAL MEDICINE | Facility: CLINIC | Age: 74
End: 2024-11-05

## 2024-11-05 VITALS — DIASTOLIC BLOOD PRESSURE: 76 MMHG | SYSTOLIC BLOOD PRESSURE: 123 MMHG | HEART RATE: 86 BPM

## 2024-11-05 DIAGNOSIS — E66.813 CLASS 3 SEVERE OBESITY DUE TO EXCESS CALORIES WITH SERIOUS COMORBIDITY AND BODY MASS INDEX (BMI) OF 40.0 TO 44.9 IN ADULT (H): ICD-10-CM

## 2024-11-05 DIAGNOSIS — R29.898 WEAKNESS OF RIGHT SHOULDER: Primary | ICD-10-CM

## 2024-11-05 DIAGNOSIS — I63.9 ACUTE CVA (CEREBROVASCULAR ACCIDENT) (H): ICD-10-CM

## 2024-11-05 DIAGNOSIS — E11.65 TYPE 2 DIABETES MELLITUS WITH HYPERGLYCEMIA, WITHOUT LONG-TERM CURRENT USE OF INSULIN (H): Primary | ICD-10-CM

## 2024-11-05 DIAGNOSIS — E66.01 CLASS 3 SEVERE OBESITY DUE TO EXCESS CALORIES WITH SERIOUS COMORBIDITY AND BODY MASS INDEX (BMI) OF 40.0 TO 44.9 IN ADULT (H): ICD-10-CM

## 2024-11-05 DIAGNOSIS — N18.31 CHRONIC KIDNEY DISEASE (CKD) STAGE G3A/A1, MODERATELY DECREASED GLOMERULAR FILTRATION RATE (GFR) BETWEEN 45-59 ML/MIN/1.73 SQUARE METER AND ALBUMINURIA CREATININE RATIO LESS THAN 30 MG/G (H): Primary | ICD-10-CM

## 2024-11-05 PROCEDURE — G2211 COMPLEX E/M VISIT ADD ON: HCPCS | Mod: 95 | Performed by: INTERNAL MEDICINE

## 2024-11-05 PROCEDURE — 99417 PROLNG OP E/M EACH 15 MIN: CPT | Performed by: PSYCHIATRY & NEUROLOGY

## 2024-11-05 PROCEDURE — G2211 COMPLEX E/M VISIT ADD ON: HCPCS | Performed by: PSYCHIATRY & NEUROLOGY

## 2024-11-05 PROCEDURE — 99214 OFFICE O/P EST MOD 30 MIN: CPT | Mod: 95 | Performed by: INTERNAL MEDICINE

## 2024-11-05 PROCEDURE — 99215 OFFICE O/P EST HI 40 MIN: CPT | Performed by: PSYCHIATRY & NEUROLOGY

## 2024-11-05 RX ORDER — POTASSIUM CHLORIDE 1500 MG/1
60 TABLET, EXTENDED RELEASE ORAL 2 TIMES DAILY
OUTPATIENT
Start: 2024-11-05

## 2024-11-05 RX ORDER — ATORVASTATIN CALCIUM 40 MG/1
20 TABLET, FILM COATED ORAL EVERY EVENING
OUTPATIENT
Start: 2024-11-05

## 2024-11-05 NOTE — LETTER
2024      [place]ultz  [address] [place] [address]      Dear Colleague,    Thank you for referring your patient, Mike Schultz, to the [facility]. Please see a copy of my visit note below.      Neurology Consultation    Patient Name:  Mike Schultz  MRN:  [id number]    :  1950  Date of Service:  2024  Primary care provider:  Ranjan Courtney        Chief Complaint: Hospital follow-up for stroke     History of Present Illness:     Mike Schultz is a 74 year old male who presents for hospital follow-up for stroke.     He feels like he is about 60% back to baseline.  Speech and facial weakness improved although sometimes speech will get slurred. Will sometimes have difficulty remembering people's names.  Still has some numbness/weakness in left hand.      Being worked up for TAVR.  Has not had RAMIRO/Watchman device - cardiology wanting neuro input. CT angiogram no signs of thrombus.     Date of stroke:2024  Symptoms: Left facial weakness, left hand numbness/weakness, and dysarthria   Location of stroke: R frontal, distal M3/M4  Etiology of stroke: Possibly cardioembolic although patient taking Xarelto regularly - no missed doses - for atrial fibrillation.   CHADSVASC2 if patient has a fib:   PFO/Watchman candidate? (If <60): Working with cardiology-consideration of Watchman   CEA/Carotid stenting candidate?: No  Aspirin?: No - on Xarelto   Hx of aspirin failure?: No  Requiring anticoagulation?: Yes, on Xarelto   Smoker/smoking cessation? Denies tobacco use   Cholesterol:  - start on atorvastatin 40 mg - takes 20 mg as it made him more short of breath to take higher dose?  PT/OT/SLP needs: Working with speech therapy   Sleep apnea?: +Sleep apnea, uses CPAP, plugged in with sleep clinic   Diet: Discussed Mediterranean diet, he has tried to loose weight with diet modifications. Going to talk to PCP about  weight loss   Diabetes/control: A1c 6.5%  Current modified baljeet scale: 1-2      Stroke Summary per Inpatient Stroke Team   MRI/Head CT Acute/subacute appearing infarct in the posterior right frontal  lobe. No evidence of hemorrhagic transformation   Intracranial Vasculature Mild-to-moderate stenoses of the cavernous and supraclinoid  ICAs from atherosclerotic disease, . Moderate stenosis at the  origin of the left vertebral artery.   Cervical Vasculature No LVO   CT perfusion images suggests possible bandlike area of  ischemia/infarct in the posterior right frontal lobe region. This  would likely be of a distal right M3/M4 vessel which is not well  evaluated by this CTA.  Echocardiogram EF 55-60%, no regional wall motion abnormalities,moderate to severe aortic stenosis, normal left atrial size    EKG/Telemetry NSR w/ 1st degree AV block      LDL  9/5/2024: 115 mg/dL   A1C  9/5/2024: 6.5 %   Troponin 9/5/2024: 32 ng/L    - CT chest/abd/pelvis: No evidence of malignancy     Past Medical History:  - Hyperlipidemia, persistent atrial fibrillation (on Xarelto, s/p multiple ablations), moderate to severe aortic stenosis, alcohol use disorder (in remission), sleep apnea, hypertension, history of Bell's palsy (right side, approximately 2 years ago), HfPEF, chronic kidney disease     Social History:  Drinks socially/rarely.  Denies recreational drug use.   Sell insurance - back to work.    Denies tobacco use     Allergies:  Allergies   Allergen Reactions     Amiodarone Shortness Of Breath     Suspected amiodarone toxicity involving lungs       Physical Exam:  /76 (BP Location: Left arm, Patient Position: Sitting, Cuff Size: Adult Large)   Pulse 86     General:  No acute distress  Cardiovascular: Normal rate.  Lung: Respirations are non-labored.  Extremities: Normal range of motion  Integumentary: Warm and dry    Neurologic:  Mental status : alert, fund of knowledge appropriate for visit    LANGUAGE: Speech fluent,  mild dysarthria     CN:  II- pupils equal and reactive, visual fields full  III, IV, VI- extraocular movements intact  V- sensation intact bilaterally  VII- Facial weakness R side and L side   VIII- hearing intact, no nystagmus  IX, X- palate elevates symmetrically  XI- sternocleidomastoid 5/5  XII- tongue midline    MOTOR : intact bulk and tone  5/5 strength except right shoulder abduction weakness 4-    SENSORY : Intact to temp in BUE and BLE, reduced vib sensation in toes, pp intact in BUE and BLE     REFLEXES:       Right Left   Triceps 2 2   Biceps 2 2   Brachioradialis 2 2   Knee jerk 2 2   Ankle jerk 0 0   Dodge absent   Toes down going     CEREBELLUM: finger to nose, finger taps, and heel to shin intact bilaterally     GAIT : Wider based waddling gait, slight limp on R    Cognition and Speech: Speech clear and coherent.    Psychiatric: Cooperative, Appropriate mood & affect.     Assessment/Plan:     Mike Schultz is a 74 year old year old male who presents for follow-up concerning stroke.      Right frontal stroke   - Etiology: Based on the location of the stroke, I would suspect that this is cardioembolic although can not say for certain and would be unusual to have a cardioembolic stroke while being compliant with Xarelto. Cardiology is considering a RAMIRO and Watchman device. I'd like to discuss this patient's case with my stroke neurology colleagues.  No other ready source of embolism with CT C/A/P negative for malignancy and CTA neck with no significant atherosclerosis.   - Continue Xarelto for secondary stroke prevention   - Encourage Mediterranean diet, low sodium and sugar consumption  - Continue speech therapy   - Reveiwed stroke warning signs     Hypertension  - Goal BP is <130/80 with tighter control associated with improved vascular outcomes.   - Recommend home blood pressure monitoring and keeping a BP log for primary care follow up      Hyperlipidemia  -  (goal 40-70); continue  Lipitor 20 mg with ongoing outpatient titration to achieve LDL goal   - Recheck LDL in 1 month (3 months from initiation of Lipitor), ordered     Diabetes   - Recent A1c was 6.5%, (goal <7% for secondary stroke prevention), follow-up with PCP    BOWEN  - Continue nightly use of CPAP  - Reviewed that untreated sleep apnea is an independent risk factors for stroke    Right arm weakness  - Patient had isolated right shoulder abduction (deltoid) weakness.  Patient was unaware of it until I noticed it on exam.  He is uncertain when it started.  Denies pain or numbness associated.  I am unsure what the etiology of this would be. Unlikely to be vascular related as he has no other symptoms and unusual for isolated deltoid weakness.  He did mention having shortness of breath with atorvastatin 40 mg (not at 20 mg) and considered statin induced myopathy but this does not fit this presentation.  Will send him to PT.    Follow-up in 3 months     I spent 70 minutes providing care for this patient, including reviewing imaging, labs, and notes as well as providing counseling and coordination of care for the above issues.      Again, thank you for allowing me to participate in the care of your patient.        Sincerely,        Orin Edmonds, DO

## 2024-11-05 NOTE — PROGRESS NOTES
Brayden is a 74 year old who is being evaluated via a billable video visit.    How would you like to obtain your AVS? MyChart  If the video visit is dropped, the invitation should be resent by: Text to cell phone: [phone]  Will anyone else be joining your video visit? No    Assessment & Plan   Type 2 diabetes mellitus with hyperglycemia, without long-term current use of insulin (H)  Script sent to start Mounjaro, patient will check insurance coverage. Needs UPC. Future lab order placed.  - Tirzepatide 2.5 MG/0.5ML SOAJ; Inject 0.5 mLs (2.5 mg) subcutaneously every 7 days.  - Albumin Random Urine Quantitative with Creat Ratio; Future    Class 3 severe obesity due to excess calories with serious comorbidity and body mass index (BMI) of 40.0 to 44.9 in adult (H)  BMI 43. He has been unsuccessful in losing much weight through lifestyle interventions. He is interested in trying a GLP-1 agonist. It appears Mounjaro may be covered by his insurance. Discussed this medication's dosing scheduled and need for follow-up as we adjust dose, encouraged him to make follow-up video visit in ~5 weeks to discuss how he's tolerating it. Discussed possible side effects, including n/v, diarrhea, constipation. He'll monitor.   - Tirzepatide 2.5 MG/0.5ML SOAJ; Inject 0.5 mLs (2.5 mg) subcutaneously every 7 days.    F/u with regular PCP at regular interval or sooner PRN    Signed Electronically by:  Ranjan Bustamante MD, MPH  [facility]  Internal Medicine    The longitudinal plan of care for the diagnosis(es)/condition(s) as documented were addressed during this visit. Due to the added complexity in care, I will continue to support Brayden in the subsequent management and with ongoing continuity of care.    Subjective   Brayden is a 74 year old who presents for a same day acute care virtual video visit with chief concern of: Weight Loss    Video Start Time: 4:28 PM    HPI   Patient would like to try GLP-1  agonist to help with new diagnosis of diabetes as well as weight loss. Most recent A1c was 6.5%.        Objective       Vitals: No vitals were obtained today due to virtual visit.    Physical Exam   GENERAL: alert and no distress  EYES: Eyes grossly normal to inspection.  No discharge or erythema, or obvious scleral/conjunctival abnormalities.  RESP: No audible wheeze, cough, or visible cyanosis.    SKIN: Visible skin clear. No significant rash, abnormal pigmentation or lesions.  NEURO: Cranial nerves grossly intact.  Mentation and speech appropriate for age.  PSYCH: Appropriate affect, tone, and pace of words    Video-Visit Details  Type of service: Video Visit   Video End Time: 4:41 PM  Originating Location (pt. Location): Home  Distant Location (provider location):  Off-site  Platform used for Video Visit: Jeremy   normal...

## 2024-11-05 NOTE — PROGRESS NOTES
Dr Valles wants him to start 10 of prednisone till his procedure is done and a week after surgery drop it to 5 mg and then continue that until seen by me in February. NO Pcp prolylaxis necessary. He needs calcium vitamin d tabs. Buy it from [employer].     Informed patient of discussion, recommendation and plan. Patient agrees with plan. Currently TAVR is not scheduled, they are awaiting report for Neurology after patient's recent stroke. Patient will message us one the decision on the TAVR is made. Agreed to start taking Calcium and Vit D.

## 2024-11-05 NOTE — NURSING NOTE
Chief Complaint   Patient presents with    Stroke     [facility] up from [facility] [place]       Farideh Celeste KIMBERLY on 11/5/2024 at 1:53 PM

## 2024-11-06 ENCOUNTER — MYC MEDICAL ADVICE (OUTPATIENT)
Dept: NEUROLOGY | Facility: CLINIC | Age: 74
End: 2024-11-06

## 2024-11-06 ENCOUNTER — THERAPY VISIT (OUTPATIENT)
Dept: SPEECH THERAPY | Facility: CLINIC | Age: 74
End: 2024-11-06
Attending: INTERNAL MEDICINE
Payer: COMMERCIAL

## 2024-11-06 DIAGNOSIS — R47.1 DYSARTHRIA: ICD-10-CM

## 2024-11-06 DIAGNOSIS — I63.9 ACUTE CVA (CEREBROVASCULAR ACCIDENT) (H): ICD-10-CM

## 2024-11-06 RX ORDER — ATORVASTATIN CALCIUM 40 MG/1
20 TABLET, FILM COATED ORAL EVERY EVENING
Qty: 45 TABLET | Refills: 3 | Status: SHIPPED | OUTPATIENT
Start: 2024-11-06

## 2024-11-06 RX ORDER — POTASSIUM CHLORIDE 1500 MG/1
60 TABLET, EXTENDED RELEASE ORAL 2 TIMES DAILY
Qty: 180 TABLET | Refills: 3 | Status: SHIPPED | OUTPATIENT
Start: 2024-11-06

## 2024-11-06 NOTE — PATIENT INSTRUCTIONS
Stretches, Relaxation, Strategies to Reduce Synkinesis      Go Blah  Active Facial Relaxation  With this strategy, you are going to use your mind to relax your face.    Allow your jaw to drop down as if it is hanging like a hammock between two trees   Allow your back teeth to part slightly   Open your lips slightly as it feels comfortable   Imaging your entire face is heavy and hanging downward   Consider using relaxing visual imagery to help   Relax in this manner for 5-10 seconds intermittently throughout the day     #1. Advanced Hook   Place the pointer and index fingers (of the unaffected side) deep inside your cheek   Using your fingers, stretch the muscle outward, pusing it in the direction away from your teeth   Like a hook   Hold the stretch for 60 seconds   Relax the facial muscles   Slowly remove the hand and just let everything hang loose for 5 seconds   Place the pointer and index fingers (of the unaffected side) inside your cheek but closer to the corner of your mouth this time or where it feels most tight   Using your fingers, stretch the muscle outward, pusing it in the direction away from your teeth   Like a hook   Hold the stretch for 60 seconds   Relax the facial muscles   Slowly remove the hand and just let everything hang loose for 5 seconds     #2. Wheel Massage   Consider your face a wheel and you are massaging the spokes of a wheel, from the outer rim to the center .  In that way, you will massage the entire side of your face in sections.  As you move along, if you find painful points, maintain the pressure on it until the pain diminishes, then continue on with the stretch.  For the best massage technique, you will want to use the hand that is opposite of your facial tightness.  For each section, stretch slowly, for ~8-10 seconds per area.  You will repeat each section three times in each muscle section or go around the half [place] of stretches 3 times (one group at a time).    For the first  section, place your thumb in your mouth in front of your upper teeth and two or three fingers on the nose   Pull the tissue down with two or three fingers until you meet your thumb   Press together (from the inside and outside) while you stretch your face toward the center of your lips.    For this section, pull in a slight arc around your nostril and pull all the way through the lip.    The second section t is located under your eye, but do NOT pull the lower lid down  Start below the eye and pull straight towards the center of the lips   The third section is located at the temple where you will pull downward toward the center of the lips   For the fourth section, is located in front of your ear   Pull horizontally to the center of the lips   The final section is located under the jaw but more towards the affected side  Pull upward to the center of the lips   The thumb is inside between the front of the lower teeth and inside the chin    Exercises to Improve Smile     #3. Active Assisted to Active Snarl  Using one finger placed flat and vertically next to nose (with the tip of the finger even with the top of the nostril  Gently assist this area to move straight upward while you use your muscles on both sides to make a  stinky face   Wrinkle the nose hard, trying to expose your upper teeth or gums equally on both sides  Hold for 5 seconds   Slowly remove the finger and keep snarling for another 3-5 seconds   Relax   Repeat 10-20x; discontinue before 20 if the muscles feel tired    #4 Speech Exercise  Practice speech worksheets, reading out loud  Practice reading out loud for 10-15 minutes per day  Remember   1. Go slow   2. Over articulate   3. Get loud

## 2024-11-06 NOTE — TELEPHONE ENCOUNTER
"Brayden calling asking about medication refills. Pt states it was approved and then denied. Explained to pt then it was approved and second encounter was \"denied\" due to duplicate encounters.     Hanny Smart RN    "

## 2024-11-06 NOTE — TELEPHONE ENCOUNTER
"Patient has been out of both Atorvastatin and Potassium Chloride for 2 days.     Per chart review, scripts were previously denied as \"Should already have refills on file\".   Pharmacy reports there is no refills on file.  Per dispense report, patient last picked up Atorvastatin 9/7/24 for 30 day supply.     Potassium was picked up 10/14/24 with sig written as 40 mEq BID for 120 tablets, however patient is taking 60 mEq BID so this was only a 20 day supply.           "

## 2024-11-06 NOTE — PROGRESS NOTES
"SPEECH LANGUAGE PATHOLOGY EVALUATION  Facial Paralysis Evaluation        Fall Risk Screen:  Have you fallen 2 or more times in the past year?: (Patient-Rptd) No  Have you fallen and had an injury in the past year?: (Patient-Rptd) No  Is patient a fall risk?: No    Subjective        Presenting condition or subjective complaint: Pt is a 74 year old male that was seen by speech therapy today for facial paralysis evaluation.   Date of onset: 09/05/24    Relevant medical history:  Pt had onset of [place] Palsy in Jun 2022. He reports he lost all function of the right side of his face and he could not close his eye. Pt reports he went to the ER and was placed on \"anti-inflammatory\" but he was unsure if it was prednisone and/or anti-virals. Pt repots in approx 3-4 months following onset, he noticed improvement and he was able to close his eye. Pt reports that he did use electrical stimulation on his right face at that time.   On 9/5/2024 Pt woke up with new onset of facial weakness on the left side and also weakness in his left arm. 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 Pt reports he received  speech therapy and also met with OP SLP Briana Kuo who referred Pt for facial paralysis treatment. Today, Pt reports he has had some recovery on the left side of his face, but still has ongoing weakness.  Dates & types of surgery: (Patient-Rptd) Knee    Prior diagnostic imaging/testing results:       Prior therapy history for the same diagnosis, illness or injury: (Patient-Rptd) No      Living Environment  Social support: (Patient-Rptd) With a significant other or spouse   Help at home:    Equipment owned: (Patient-Rptd) Walker; Raised toilet seat; Bath bench     Employment: (Patient-Rptd) Yes (Patient-Rptd) Insurance  Hobbies/Interests: (Patient-Rptd) Baseball cards    Patient goals for therapy:   \"I would like to increase my strength on the left, smile better, speak " "better, and not feel heavy on the right\".     Pain assessment: Pain denied     Objective     Functional problems: imprecise articulation, inability to smile, inability to convey facial expressions   Previous or other treatment: none    Diagnostic tests: MRI, CT scan   Occupation:   Sensory changes: numbness    Hearing Changes: none    Eye Problems: none     [place] Facial Grading System(See Facial Paralysis flowsheet for additional details):     Voluntary Movement Score (Total x 4): 84     Resting Symmetry Score (Total x 5): 5     Total Synkinesis Score (Total): 6     [place] Facial Grading System Score: voluntary movement score - resting summetry score - synkineses score = composit score: 73/100           Assessment & Plan   CLINICAL IMPRESSIONS   Medical Diagnosis: Facial Paralysis s/p BP and CVA    Treatment Diagnosis: Dysarthria   Impression/Assessment: Pt is a 74 year old male with speech  complaints. The following significant findings have been identified: impaired speech intelligibility, characterized by decreased articulation accuracy related to lingual and facial weakness. Identified deficits interfere with their ability to communicate within the home or community as compared to previous level of function. Pt is demonstrating synkinesis on the right side of his face related to previous [place] Palsy, and new onset of central facial weakness on the left. On the right, Pt has narrowing of the right eye at rest, slight excursion of zygomatics with eye closure and forehead wrinkle. He has more moderate synkinesis of obicularis occuli on the right with pucker, snarl and smile. On the left, Pt has weakness at levators that cause upper obicularis oris to hang lower with smile. There is slight lingual deviation and atrophy on the left (not related to facial nerve-onset with CVA). Time was spent today educating difference between UMN and LMN involvement of facial nerve and recovery. Discussed " recovery with UMN is different than LMN. Pt was educated on the synkinesis on the right from his previous BP. Pt was unaware of right eye closing with mouth movements. Discussed that with facial rehab, the area of most progress will be in the right side to decrease synkinesis; however, pt wants to focus on the left side, particularly at the mouth. Pt was trained on massage on the right to decrease sensation of heaviness and exercise for the left. However, unsure of recovery on the left due to UMN lesion. Pt was also provided with information on speech intelligibility strategies related to lingual weakness. Pt was education that this is also a result of the CVA impacting hypoglossal nerve. Recommend Pt follow up with speech therapy in 1 month to assess progress. Pt demonstrates and reports comprehension of all information and agrees with the plan of care.     PLAN OF CARE  Treatment Interventions: Swallowing dysfunction and/or oral function for feeding    Prognosis to achieve stated therapy goals is good   Rehab potential is impacted by: comorbidities, current level of function, family/caregiver support, patient awareness of deficits, patient motivation    Long Term Goals:   SLP Goal 1  Goal Identifier: FGS  Goal Description: Pt will increase her FGS by 10 points reflecting gains in resting symmetry, symmetry of movement, and reduced synkinesis when compared to her score at the initial evaluation.  Rationale: To maximize safety, ease and/or independence of oral intake  Goal Progress: 73/100  Target Date: 02/03/25  SLP Goal 2  Goal Identifier: Speech Intelligibility  Goal Description: Pt will demonstrate 100% speech intelligibility in 5 minute conversational speech with independence for speech strategies  Rationale: To maximize safety, ease and/or independence of oral intake  Goal Progress: Pt was provided with home exercise worksheets for speech sounds particularly impacted by lingual involvement. No difficulty with  /p/ and /b/  Target Date: 02/03/25  SLP Goal 3  Goal Identifier: FaCE  Goal Description: Pt will increase her FaCE instrument score by 10 points reflcting gains in physical functioning and acceptance when compared to her score taken at the initial evaluation.  Rationale: To maximize safety, ease and/or independence of oral intake  Goal Progress: 65/100  Target Date: 02/03/25  SLP Goal 4  Goal Identifier: Tension/tightness  Goal Description: Pt will report a 25% reduction in tension and tightness present on the L side when compared to his status noted at the initial evaluation.  Rationale: To maximize safety, ease and/or independence of oral intake  Goal Progress: Pt reports sensation of heaviness on the right side. Suspect this is from tension related to synkinesis as well as overcompensation for the weakness on the left  Target Date: 02/03/25  SLP Goal 5  Goal Identifier: Massage  Goal Description: Pt will independently complete massage and stretches as trained for completion 2-3x/day.  Rationale: To maximize safety, ease and/or independence of oral intake  Goal Progress: Pt was trained on the hook and the wheel for now. Aside from heaviness, pt does not endorse much sensation of tension. However with the hook and the wheel, he does feel points of tenderness that he does not feel on the left.  Target Date: 02/03/25  SLP Goal 6  Goal Identifier: Synkinesis  Goal Description: Pt will demonstrate understanding of synkinesis, identify muscles as tight, and independently employ stretches for management as needed.  Rationale: To maximize functional communication within the home or community  Goal Progress: Pt demonstrates synkinesis from eye to cheek with eye closure and raised eyebrows (subtle). Pt also have more pronounced eye closure with pucker, smile and snarl. Pt is weak on the left from the CVA, so he was trained on snarl exercise, but strongly encouraged to be mindful of his right eye and to keep it open  Target  Date: 02/03/25      Frequency of Treatment: 1x/mo  Duration of Treatment: up to 12 weeks     Recommended Referrals to Other Professionals:  none  Education Assessment:        Risks and benefits of evaluation/treatment have been explained.   Patient/Family/caregiver agrees with Plan of Care.     Evaluation Time:    Sound production (artic, phonology, apraxia, dysarthria) Minutes (54299): 45     Present: Not applicable     Signing Clinician: Karen Orantes

## 2024-11-07 ENCOUNTER — TELEPHONE (OUTPATIENT)
Dept: INTERNAL MEDICINE | Facility: CLINIC | Age: 74
End: 2024-11-07
Payer: COMMERCIAL

## 2024-11-07 NOTE — TELEPHONE ENCOUNTER
Prior Authorization Retail Medication Request    Medication/Dose:Tirzepatide 2.5 MG/0.5ML SOAJ    Diagnosis and ICD code (if different than what is on RX):  E11.65, E66.813, E66.01, Z68.41  New/renewal/insurance change PA/secondary ins. PA:  Previously Tried and Failed:    Rationale:      Insurance   Primary: [employer]  Insurance ID:  [id number]    Secondary (if applicable):  Insurance ID:      Pharmacy Information (if different than what is on RX)  Name:  [facility]  Phone:  [phone]  Fax:[phone]    Clinic Information  Preferred routing pool for dept communication:

## 2024-11-07 NOTE — TELEPHONE ENCOUNTER
PA Initiation    Medication: MOUNJARO 2.5 MG/0.5ML SC SOAJ  Insurance Company: Other (see comments)  Pharmacy Filling the Rx: [id number] - [place] - [address] AT [facility] [address]  Filling Pharmacy Phone: [phone]  Filling Pharmacy Fax: [phone]  Start Date: 11/7/2024

## 2024-11-11 NOTE — TELEPHONE ENCOUNTER
Prior Authorization Approval    Medication: MOUNJARO 2.5 MG/0.5ML SC SOAJ  Authorization Effective Date: 11/11/2024  Authorization Expiration Date: 12/31/2099  Reference #: [id number]   Insurance Company: Other (see comments)  Which Pharmacy is filling the prescription: [facility] #[id number] - [place], [place] - [address] AT [address]  Pharmacy Notified: YES  Patient Notified: YES

## 2024-11-13 ENCOUNTER — TELEPHONE (OUTPATIENT)
Dept: INTERNAL MEDICINE | Facility: CLINIC | Age: 74
End: 2024-11-13
Payer: COMMERCIAL

## 2024-11-13 NOTE — TELEPHONE ENCOUNTER
Jana from [employer] called asking if we received the fax they sent over (Prescription auth form).     They are moving pts mounjaro to a different pharmacy.     Writer was unable to see any forms in providers folder.     Jana stated. If we didn't receive the form if provider can just send pts script to them? She also asked that this be handled today or tomorrow.     Routing HP to team and PCP to review.     Please fax to [phone]

## 2024-11-13 NOTE — TELEPHONE ENCOUNTER
Jennifer BRITTON for [facility] was faxed. New form received from [employer] for authorization of medication. New form in providers folder for review.

## 2024-11-14 ENCOUNTER — MEDICAL CORRESPONDENCE (OUTPATIENT)
Dept: HEALTH INFORMATION MANAGEMENT | Facility: CLINIC | Age: 74
End: 2024-11-14
Payer: COMMERCIAL

## 2024-11-14 NOTE — TELEPHONE ENCOUNTER
I have signed the new form as well. Please fax it along with the script info. Given to [facility].

## 2024-11-15 ENCOUNTER — OFFICE VISIT (OUTPATIENT)
Dept: CARDIOLOGY | Facility: CLINIC | Age: 74
End: 2024-11-15
Payer: COMMERCIAL

## 2024-11-15 VITALS
SYSTOLIC BLOOD PRESSURE: 130 MMHG | HEIGHT: 68 IN | HEART RATE: 66 BPM | DIASTOLIC BLOOD PRESSURE: 78 MMHG | BODY MASS INDEX: 44.15 KG/M2 | WEIGHT: 291.3 LBS

## 2024-11-15 DIAGNOSIS — I35.0 NONRHEUMATIC AORTIC VALVE STENOSIS: Primary | ICD-10-CM

## 2024-11-15 DIAGNOSIS — I35.0 AORTIC VALVE STENOSIS, ETIOLOGY OF CARDIAC VALVE DISEASE UNSPECIFIED: ICD-10-CM

## 2024-11-15 DIAGNOSIS — R06.02 SOB (SHORTNESS OF BREATH): ICD-10-CM

## 2024-11-15 DIAGNOSIS — I63.9 ACUTE CVA (CEREBROVASCULAR ACCIDENT) (H): ICD-10-CM

## 2024-11-15 DIAGNOSIS — I48.0 PAROXYSMAL ATRIAL FIBRILLATION (H): ICD-10-CM

## 2024-11-15 NOTE — PROGRESS NOTES
HPI and Plan:   Mike Schultz is a 73 year old male with past medical history of atrial fibrillation since 2012.  He had failed flecainide and then underwent atrial fibrillation ablation in 2017.  In a flutter ablation in 2019.  He was then started on amiodarone and went back into persistent A-fib in 2021.  He had another cardioversion and restarting of amiodarone in October 2023.  Subsequently amiodarone was discontinued because of pneumonitis.  In May of this year, he had another pulmonary vein ablation with posterior wall ablation.  Since then he has remained in sinus rhythm.    He also has aortic stenosis,  renal insufficiency, chronic heart failure with preserved ejection fraction.  He had acute CHF exacerbation in July and was treated with IV Bumex.  At that time he was also evaluated for nephrology because of worsening renal insufficiency.  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  Watchman device has been entertained by some of his providers but not considered by the structural heart team as they were not sure that he failed Xarelto.  Subsequently has seen neurology on 5 November and I was able to review the notes.  After review with the stroke neurologist, the medical neurologist felt that watchman was not necessary as besides cardiac source of emboli, patient also has other potential causes of stroke including atherosclerosis of the intracranial arteries, and aortic arch.  They contemplated being aspirin or switching to warfarin but have not recommended that to the patient yet.  Have asked the patient to discuss with them.    Patient also has moderate to severe aortic stenosis and has been seen in the structural heart clinic and recommended further workup including right and left heart catheterization.  RAMIRO was also recommended.  It was already scheduled but has to be canceled as they wanted neurology clearance.  Neurology now has allowed us to proceed with these  invasive procedures.  He will have to hold Xarelto for 2 days before the procedure.  Last echocardiogram in September revealed aortic valve area 1 cm  with a mean gradient of 28 mm.  Ejection fraction was normal.  I reviewed the echo images myself.    Patient's main complaint has been exertional shortness of breath especially for the last year that is worsening.  He is limited by what he can do.  He also has seen pulmonologist and they did PFTs recently which showed normal spirometry volumes.  Diffusion capacity was only mildly reduced.  Does his shortness of breath is out of proportion to the findings on the PFTs and therefore unlikely to have a pulmonary cause.  Therefore, cardiac cause needs to be excluded including underlying coronary artery disease.  If there is no significant coronary artery disease, then aortic stenosis may be contributing to the symptoms.  I also told the patient that part of his shortness of breath and deconditioning may be related to underlying diastolic dysfunction as well as obesity.  He is contemplating use of Mounjaro at some point but likely will do it after aortic stenosis is addressed by transfemoral aortic valve replacement.    On exam regular rate and rhythm previous ejection stock murmur aortic area conducted to carotids.  No significant wheezing.  No peripheral edema.    Impression    Chronic exertional shortness of breath, worsening this year, multifactorial cause of the aortic stenosis may be contributing.    Recent CVA, multifactorial as noted by neurology.  Continue long-term Xarelto.  They are recommending adding baby aspirin or switching to warfarin but this can be addressed after workup of aortic stenosis and intervention is completed.  I asked him to discuss that with neurology.    Moderate-severe aortic stenosis, patient is quite symptomatic and will benefit from intervention.  Prior to that he needs right and left heart catheterization.  I reviewed the risks and  benefits including the need to stop Xarelto for 48 hours and he understands it.Risks and benefits of left heart catheterization, aka coronary angiogram were discussed with the patient in detail. 0.1-0.3% (for diagnostic angio) and 1-2% (for PCI)  risk of stroke, MI, death, emergent bypass grafting, risk of contrast induced allergic reaction, renal dysfunction, vascular complications were discussed. Patient understands and wishes to proceed.The risks and benefits of right heart catheterization with or without vasodilator study including risks of (0.1% -0.3%) bleeding, infection, death, arrhythmias, pulmonary artery rupture, etc were discussed with patient and he agrees to proceed with it.  At this time, I do not see an indication for doing a transesophageal echocardiogram.  I did review the CT TAVR results including the severe aortic valve calcification.  Therefore, we will hold off on the RAMIRO at this time.    Obesity, he may consider use of Mounjaro after the aortic stenosis is addressed.    Paroxysmal atrial fibrillation and flutter, last ablation in May, remains in sinus rhythm.  Follows with EP.    Discussion  Schedule right and left heart catheterization with coronary angiography  Further recommendation based on results of this test.  Will need to follow with structural heart team after that.  Based on neurology notes and structural heart notes, no need for watchman at this time.  Hold Xarelto for 48 hours prior to invasive procedure.  Will hold off RAMIRO not sure if will add further to our decision making at this time.  After the transfemoral aortic valve replacement is successfully completed, he can return to see EP and general cardiology/my team.    Thank you for allowing us to personally care of this nice patient.  Today's visit I reviewed the recent cardiology, structural heart notes, neurology notes, the echo images were reviewed by myself, EKGs were reviewed.  Last 2 hospitalization notes were reviewed.   Today's medical decision making was of high complexity.    The longitudinal plan of care for the diagnosis(es)/condition(s) as documented were addressed during this visit. Due to the added complexity in care, I will continue to support Brayden in the subsequent management and with ongoing continuity of care.     Sincerely,    Trevin Zuleta MD        No orders of the defined types were placed in this encounter.      No orders of the defined types were placed in this encounter.      There are no discontinued medications.      Encounter Diagnoses   Name Primary?    Acute CVA (cerebrovascular accident) (H) Yes    Paroxysmal atrial fibrillation (H)     Aortic valve stenosis, etiology of cardiac valve disease unspecified        CURRENT MEDICATIONS:  Current Outpatient Medications   Medication Sig Dispense Refill    allopurinol (ZYLOPRIM) 300 MG tablet TAKE ONE TABLET (300 MG) BY MOUTH ONE TIME DAILY 90 tablet 0    atorvastatin (LIPITOR) 40 MG tablet Take 0.5 tablets (20 mg) by mouth every evening. 45 tablet 3    bumetanide (BUMEX) 1 MG tablet 2 mg am and 2 mg pm 360 tablet 1    cetirizine (ZYRTEC) 10 MG tablet Take 10 mg by mouth every evening      finasteride (PROPECIA) 1 MG tablet Take 1 tablet (1 mg) by mouth daily 90 tablet 2    losartan (COZAAR) 25 MG tablet Take 0.5 tablets (12.5 mg) by mouth daily. 45 tablet 2    potassium chloride pushpa ER (KLOR-CON M20) 20 MEQ CR tablet Take 3 tablets (60 mEq) by mouth 2 times daily. 180 tablet 3    rivaroxaban ANTICOAGULANT (XARELTO ANTICOAGULANT) 20 MG TABS tablet Take 1 tablet (20 mg) by mouth daily (with dinner) 90 tablet 3    Tirzepatide 2.5 MG/0.5ML SOAJ Inject 0.5 mLs (2.5 mg) subcutaneously every 7 days. (Patient not taking: Reported on 11/15/2024) 2 mL 2       ALLERGIES     Allergies   Allergen Reactions    Amiodarone Shortness Of Breath     Suspected amiodarone toxicity involving lungs       PAST MEDICAL HISTORY:  Past Medical History:   Diagnosis Date    (HFpEF) heart  failure with preserved ejection fraction (H)     Acute gouty arthritis     Alcohol use disorder in remission     Amiodarone pulmonary toxicity     Aortic stenosis     Basal cell carcinoma     Of the nose    Bell's palsy     Herpes simplex without mention of complication     Hyperlipidemia     Hypertension 2000    Persistent atrial fibrillation (H)     persistent, DCCV 12/2017, ablation 11/6/17    Pulmonary nodules     Sleep apnea     CPAP       PAST SURGICAL HISTORY:  Past Surgical History:   Procedure Laterality Date    ABLATION OF DYSRHYTHMIC FOCUS  2018, 04/12/2019    Procedure: EP Ablation Focal AFIB;  Surgeon: Fady Day MD;  Location:  HEART CARDIAC CATH LAB    ACHILLES TENDON SURGERY  1997    ANESTHESIA CARDIOVERSION N/A 2/26/2019    Procedure: ANESTHESIA CARDIOVERSION (CONNER);  Surgeon: GENERIC ANESTHESIA PROVIDER;  Location:  OR    ANESTHESIA CARDIOVERSION N/A 11/19/2020    Procedure: ANESTHESIA, FOR CARDIOVERSION (dr campbell);  Surgeon: GENERIC ANESTHESIA PROVIDER;  Location:  OR    ANESTHESIA CARDIOVERSION N/A 8/22/2022    Procedure: CARDIOVERSION;  Surgeon: GENERIC ANESTHESIA PROVIDER;  Location:  OR    ANESTHESIA CARDIOVERSION N/A 9/13/2023    Procedure: Anesthesia cardioversion;  Surgeon: GENERIC ANESTHESIA PROVIDER;  Location:  OR    ANESTHESIA CARDIOVERSION N/A 10/6/2023    Procedure: Anesthesia cardioversion;  Surgeon: GENERIC ANESTHESIA PROVIDER;  Location:  OR    ANESTHESIA CARDIOVERSION N/A 8/1/2024    Procedure: Anesthesia cardioversion;  Surgeon: GENERIC ANESTHESIA PROVIDER;  Location:  OR    BASAL CELL CARCINOMA EXCISION Right 2009    nose, took cartilage from inner ear.    CARDIOVERSION  2012, 2019    cardioversion for atr fib    COLONOSCOPY  2008    COLONOSCOPY N/A 3/22/2023    Procedure: COLONOSCOPY, FLEXIBLE, WITH LESION REMOVAL USING SNARE;  Surgeon: Anne Lucero MD;  Location:  GI    CV RIGHT HEART CATH MEASUREMENTS RECORDED N/A 7/31/2024    Procedure: Right  Heart Cath;  Surgeon: Adriano Lazo MD;  Location:  HEART CARDIAC CATH LAB    EP ABLATION FOCAL AFIB N/A 2019    Procedure: EP Ablation Focal AFIB;  Surgeon: Fady Day MD;  Location:  HEART CARDIAC CATH LAB    EP ABLATION FOCAL AFIB N/A 2024    Procedure: Ablation Atrial Fibrilation;  Surgeon: Fady Day MD;  Location:  HEART CARDIAC CATH LAB    HERNIA REPAIR, UMBILICAL  2012    Procedure: HERNIORRHAPHY UMBILICAL;  UMBILICAL HERNIA REPAIR;  Surgeon: Ra Crocker MD;  Location: [facility]    HERNIORRHAPHY UMBILICAL  2012    Procedure: HERNIORRHAPHY UMBILICAL;  UMBILICAL HERNIA REPAIR;  Surgeon: Ra Crocker MD;  Location: [facility]    ORTHOPEDIC SURGERY      ORTHOPEDIC SURGERY      OTHER SURGICAL HISTORY Right 2017    total KNEE ARTHROSCOPY     TOTAL KNEE ARTHROPLASTY Left 2018    ZZC NONSPECIFIC PROCEDURE  1997    achilles tendon    ZZC NONSPECIFIC PROCEDURE      knees, arthroscopy    ZZC NONSPECIFIC PROCEDURE      basal cell skin ca, nose    ZZC NONSPECIFIC PROCEDURE      flex sig; colonoscopy due 3/2008    ZZC NONSPECIFIC PROCEDURE      cardioversion for atr fib    ZZC TOTAL KNEE ARTHROPLASTY Left 2018    Dr. Malick Suarez       FAMILY HISTORY:  Family History   Problem Relation Age of Onset    Family History Negative Mother     Heart Disease Father         decesased at 42 - MI    Heart Disease Sister          MI    Lipids Sister     Lipids Sister     No Known Problems Sister     Heart Disease Brother          MI    Heart Disease Brother         CABG AT 49    Lung Cancer No family hx of        SOCIAL HISTORY:  Social History     Socioeconomic History    Marital status: Single     Spouse name: None    Number of children: None    Years of education: None    Highest education level: None   Tobacco Use    Smoking status: Former     Current packs/day: 0.00     Average packs/day: 1 pack/day for 34.6 years (34.6 ttl pk-yrs)     Types:  "Cigarettes     Start date:      Quit date: 2011     Years since quittin.2     Passive exposure: Past    Smokeless tobacco: Never    Tobacco comments:     quit 2011   Vaping Use    Vaping status: Never Used   Substance and Sexual Activity    Alcohol use: Not Currently    Drug use: Never    Sexual activity: Not Currently     Partners: Female     Birth control/protection: None   Social History Narrative    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     Social Drivers of Health     Interpersonal Safety: Low Risk  (2024)    Interpersonal Safety     Do you feel physically and emotionally safe where you currently live?: Yes     Within the past 12 months, have you been hit, slapped, kicked or otherwise physically hurt by someone?: No     Within the past 12 months, have you been humiliated or emotionally abused in other ways by your partner or ex-partner?: No       Review of Systems:  Skin:          Eyes:         ENT:         Respiratory:  Positive for shortness of breath     Cardiovascular:  Negative      Gastroenterology:        Genitourinary:         Musculoskeletal:         Neurologic:         Psychiatric:         Heme/Lymph/Imm:  Positive for allergies    Endocrine:  Negative        Physical Exam:  Vitals: /78   Pulse 66   Ht 1.727 m (5' 8\")   Wt 132.1 kg (291 lb 4.8 oz)   BMI 44.29 kg/m          CC  Concettailabelkys Pérez MD  [address] [place] [id number]                "

## 2024-11-15 NOTE — LETTER
11/15/2024    Ranjan Courtney MD  [address] [facility] [address]    RE: Mike Schultz       Dear Colleague,     I had the pleasure of seeing Mike Schultz in the [facility].  HPI and Plan:   Mike Schultz is a 73 year old male with past medical history of atrial fibrillation since 2012.  He had failed flecainide and then underwent atrial fibrillation ablation in 2017.  In a flutter ablation in 2019.  He was then started on amiodarone and went back into persistent A-fib in 2021.  He had another cardioversion and restarting of amiodarone in October 2023.  Subsequently amiodarone was discontinued because of pneumonitis.  In May of this year, he had another pulmonary vein ablation with posterior wall ablation.  Since then he has remained in sinus rhythm.    He also has aortic stenosis,  renal insufficiency, chronic heart failure with preserved ejection fraction.  He had acute CHF exacerbation in July and was treated with IV Bumex.  At that time he was also evaluated for nephrology because of worsening renal insufficiency.  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  Watchman device has been entertained by some of his providers but not considered by the structural heart team as they were not sure that he failed Xarelto.  Subsequently has seen neurology on 5 November and I was able to review the notes.  After review with the stroke neurologist, the medical neurologist felt that watchman was not necessary as besides cardiac source of emboli, patient also has other potential causes of stroke including atherosclerosis of the intracranial arteries, and aortic arch.  They contemplated being aspirin or switching to warfarin but have not recommended that to the patient yet.  Have asked the patient to discuss with them.    Patient also has moderate to severe aortic stenosis and has been seen in the structural heart clinic and recommended further workup including  right and left heart catheterization.  RAMIRO was also recommended.  It was already scheduled but has to be canceled as they wanted neurology clearance.  Neurology now has allowed us to proceed with these invasive procedures.  He will have to hold Xarelto for 2 days before the procedure.  Last echocardiogram in September revealed aortic valve area 1 cm  with a mean gradient of 28 mm.  Ejection fraction was normal.  I reviewed the echo images myself.    Patient's main complaint has been exertional shortness of breath especially for the last year that is worsening.  He is limited by what he can do.  He also has seen pulmonologist and they did PFTs recently which showed normal spirometry volumes.  Diffusion capacity was only mildly reduced.  Does his shortness of breath is out of proportion to the findings on the PFTs and therefore unlikely to have a pulmonary cause.  Therefore, cardiac cause needs to be excluded including underlying coronary artery disease.  If there is no significant coronary artery disease, then aortic stenosis may be contributing to the symptoms.  I also told the patient that part of his shortness of breath and deconditioning may be related to underlying diastolic dysfunction as well as obesity.  He is contemplating use of Mounjaro at some point but likely will do it after aortic stenosis is addressed by transfemoral aortic valve replacement.    On exam regular rate and rhythm previous ejection stock murmur aortic area conducted to carotids.  No significant wheezing.  No peripheral edema.    Impression    Chronic exertional shortness of breath, worsening this year, multifactorial cause of the aortic stenosis may be contributing.    Recent CVA, multifactorial as noted by neurology.  Continue long-term Xarelto.  They are recommending adding baby aspirin or switching to warfarin but this can be addressed after workup of aortic stenosis and intervention is completed.  I asked him to discuss that with  neurology.    Moderate-severe aortic stenosis, patient is quite symptomatic and will benefit from intervention.  Prior to that he needs right and left heart catheterization.  I reviewed the risks and benefits including the need to stop Xarelto for 48 hours and he understands it.Risks and benefits of left heart catheterization, aka coronary angiogram were discussed with the patient in detail. 0.1-0.3% (for diagnostic angio) and 1-2% (for PCI)  risk of stroke, MI, death, emergent bypass grafting, risk of contrast induced allergic reaction, renal dysfunction, vascular complications were discussed. Patient understands and wishes to proceed.The risks and benefits of right heart catheterization with or without vasodilator study including risks of (0.1% -0.3%) bleeding, infection, death, arrhythmias, pulmonary artery rupture, etc were discussed with patient and he agrees to proceed with it.  At this time, I do not see an indication for doing a transesophageal echocardiogram.  I did review the CT TAVR results including the severe aortic valve calcification.  Therefore, we will hold off on the RAMIRO at this time.    Obesity, he may consider use of Mounjaro after the aortic stenosis is addressed.    Paroxysmal atrial fibrillation and flutter, last ablation in May, remains in sinus rhythm.  Follows with EP.    Discussion  Schedule right and left heart catheterization with coronary angiography  Further recommendation based on results of this test.  Will need to follow with structural heart team after that.  Based on neurology notes and structural heart notes, no need for watchman at this time.  Hold Xarelto for 48 hours prior to invasive procedure.  Will hold off RAMIRO not sure if will add further to our decision making at this time.  After the transfemoral aortic valve replacement is successfully completed, he can return to see EP and general cardiology/my team.    Thank you for allowing us to personally care of this nice patient.   Today's visit I reviewed the recent cardiology, structural heart notes, neurology notes, the echo images were reviewed by myself, EKGs were reviewed.  Last 2 hospitalization notes were reviewed.  Today's medical decision making was of high complexity.    The longitudinal plan of care for the diagnosis(es)/condition(s) as documented were addressed during this visit. Due to the added complexity in care, I will continue to support Brayden in the subsequent management and with ongoing continuity of care.     Sincerely,    Trevin Zuleta MD        No orders of the defined types were placed in this encounter.      No orders of the defined types were placed in this encounter.      There are no discontinued medications.      Encounter Diagnoses   Name Primary?     Acute CVA (cerebrovascular accident) (H) Yes     Paroxysmal atrial fibrillation (H)      Aortic valve stenosis, etiology of cardiac valve disease unspecified        CURRENT MEDICATIONS:  Current Outpatient Medications   Medication Sig Dispense Refill     allopurinol (ZYLOPRIM) 300 MG tablet TAKE ONE TABLET (300 MG) BY MOUTH ONE TIME DAILY 90 tablet 0     atorvastatin (LIPITOR) 40 MG tablet Take 0.5 tablets (20 mg) by mouth every evening. 45 tablet 3     bumetanide (BUMEX) 1 MG tablet 2 mg am and 2 mg pm 360 tablet 1     cetirizine (ZYRTEC) 10 MG tablet Take 10 mg by mouth every evening       finasteride (PROPECIA) 1 MG tablet Take 1 tablet (1 mg) by mouth daily 90 tablet 2     losartan (COZAAR) 25 MG tablet Take 0.5 tablets (12.5 mg) by mouth daily. 45 tablet 2     potassium chloride pushpa ER (KLOR-CON M20) 20 MEQ CR tablet Take 3 tablets (60 mEq) by mouth 2 times daily. 180 tablet 3     rivaroxaban ANTICOAGULANT (XARELTO ANTICOAGULANT) 20 MG TABS tablet Take 1 tablet (20 mg) by mouth daily (with dinner) 90 tablet 3     Tirzepatide 2.5 MG/0.5ML SOAJ Inject 0.5 mLs (2.5 mg) subcutaneously every 7 days. (Patient not taking: Reported on 11/15/2024) 2 mL 2        ALLERGIES     Allergies   Allergen Reactions     Amiodarone Shortness Of Breath     Suspected amiodarone toxicity involving lungs       PAST MEDICAL HISTORY:  Past Medical History:   Diagnosis Date     (HFpEF) heart failure with preserved ejection fraction (H)      Acute gouty arthritis      Alcohol use disorder in remission      Amiodarone pulmonary toxicity      Aortic stenosis      Basal cell carcinoma     Of the nose     Bell's palsy      Herpes simplex without mention of complication      Hyperlipidemia      Hypertension 2000     Persistent atrial fibrillation (H)     persistent, DCCV 12/2017, ablation 11/6/17     Pulmonary nodules      Sleep apnea     CPAP       PAST SURGICAL HISTORY:  Past Surgical History:   Procedure Laterality Date     ABLATION OF DYSRHYTHMIC FOCUS  2018, 04/12/2019    Procedure: EP Ablation Focal AFIB;  Surgeon: Fady Day MD;  Location:  HEART CARDIAC CATH LAB     ACHILLES TENDON SURGERY  1997     ANESTHESIA CARDIOVERSION N/A 2/26/2019    Procedure: ANESTHESIA CARDIOVERSION (CONNER);  Surgeon: GENERIC ANESTHESIA PROVIDER;  Location:  OR     ANESTHESIA CARDIOVERSION N/A 11/19/2020    Procedure: ANESTHESIA, FOR CARDIOVERSION (dr campbell);  Surgeon: GENERIC ANESTHESIA PROVIDER;  Location:  OR     ANESTHESIA CARDIOVERSION N/A 8/22/2022    Procedure: CARDIOVERSION;  Surgeon: GENERIC ANESTHESIA PROVIDER;  Location:  OR     ANESTHESIA CARDIOVERSION N/A 9/13/2023    Procedure: Anesthesia cardioversion;  Surgeon: GENERIC ANESTHESIA PROVIDER;  Location:  OR     ANESTHESIA CARDIOVERSION N/A 10/6/2023    Procedure: Anesthesia cardioversion;  Surgeon: GENERIC ANESTHESIA PROVIDER;  Location:  OR     ANESTHESIA CARDIOVERSION N/A 8/1/2024    Procedure: Anesthesia cardioversion;  Surgeon: GENERIC ANESTHESIA PROVIDER;  Location:  OR     BASAL CELL CARCINOMA EXCISION Right 2009    nose, took cartilage from inner ear.     CARDIOVERSION  2012, 2019    cardioversion for atr fib      COLONOSCOPY       COLONOSCOPY N/A 3/22/2023    Procedure: COLONOSCOPY, FLEXIBLE, WITH LESION REMOVAL USING SNARE;  Surgeon: Anne Lucero MD;  Location:  GI     CV RIGHT HEART CATH MEASUREMENTS RECORDED N/A 2024    Procedure: Right Heart Cath;  Surgeon: Adriano Lazo MD;  Location:  HEART CARDIAC CATH LAB     EP ABLATION FOCAL AFIB N/A 2019    Procedure: EP Ablation Focal AFIB;  Surgeon: Fady Day MD;  Location:  HEART CARDIAC CATH LAB     EP ABLATION FOCAL AFIB N/A 2024    Procedure: Ablation Atrial Fibrilation;  Surgeon: Fady Day MD;  Location:  HEART CARDIAC CATH LAB     HERNIA REPAIR, UMBILICAL  2012    Procedure: HERNIORRHAPHY UMBILICAL;  UMBILICAL HERNIA REPAIR;  Surgeon: Ra Crocker MD;  Location: [facility]     HERNIORRHAPHY UMBILICAL  2012    Procedure: HERNIORRHAPHY UMBILICAL;  UMBILICAL HERNIA REPAIR;  Surgeon: Ra Crocker MD;  Location: [facility]     ORTHOPEDIC SURGERY       ORTHOPEDIC SURGERY       OTHER SURGICAL HISTORY Right 2017    total KNEE ARTHROSCOPY      TOTAL KNEE ARTHROPLASTY Left 2018     ZZC NONSPECIFIC PROCEDURE      achilles tendon     ZZC NONSPECIFIC PROCEDURE      knees, arthroscopy     ZZC NONSPECIFIC PROCEDURE      basal cell skin ca, nose     ZZC NONSPECIFIC PROCEDURE      flex sig; colonoscopy due 3/2008     ZZC NONSPECIFIC PROCEDURE      cardioversion for atr fib     ZZC TOTAL KNEE ARTHROPLASTY Left 2018    Dr. Malick Suarez       FAMILY HISTORY:  Family History   Problem Relation Age of Onset     Family History Negative Mother      Heart Disease Father         decesased at 42 - MI     Heart Disease Sister          MI     Lipids Sister      Lipids Sister      No Known Problems Sister      Heart Disease Brother          MI     Heart Disease Brother         CABG AT 49     Lung Cancer No family hx of        SOCIAL HISTORY:  Social History     Socioeconomic History     Marital  "status: Single     Spouse name: None     Number of children: None     Years of education: None     Highest education level: None   Tobacco Use     Smoking status: Former     Current packs/day: 0.00     Average packs/day: 1 pack/day for 34.6 years (34.6 ttl pk-yrs)     Types: Cigarettes     Start date:      Quit date: 2011     Years since quittin.2     Passive exposure: Past     Smokeless tobacco: Never     Tobacco comments:     quit    Vaping Use     Vaping status: Never Used   Substance and Sexual Activity     Alcohol use: Not Currently     Drug use: Never     Sexual activity: Not Currently     Partners: Female     Birth control/protection: None   Social History Narrative    Worked in retail () and insurance sales     Spends some time on [place]     Social Drivers of Health     Interpersonal Safety: Low Risk  (2024)    Interpersonal Safety      Do you feel physically and emotionally safe where you currently live?: Yes      Within the past 12 months, have you been hit, slapped, kicked or otherwise physically hurt by someone?: No      Within the past 12 months, have you been humiliated or emotionally abused in other ways by your partner or ex-partner?: No       Review of Systems:  Skin:          Eyes:         ENT:         Respiratory:  Positive for shortness of breath     Cardiovascular:  Negative      Gastroenterology:        Genitourinary:         Musculoskeletal:         Neurologic:         Psychiatric:         Heme/Lymph/Imm:  Positive for allergies    Endocrine:  Negative        Physical Exam:  Vitals: /78   Pulse 66   Ht 1.727 m (5' 8\")   Wt 132.1 kg (291 lb 4.8 oz)   BMI 44.29 kg/m          [facility] access hospital Radha Pérez MD  [address] [place] [id number]                  Thank you for allowing me to participate in the care of your patient.      Sincerely,     Trevin Zuleta MD     [facility] " Heart Care  cc:   Yasir Pérez MD  [address] [place] [id number]

## 2024-11-18 DIAGNOSIS — I35.0 NONRHEUMATIC AORTIC (VALVE) STENOSIS: Primary | ICD-10-CM

## 2024-11-18 DIAGNOSIS — R06.02 SOB (SHORTNESS OF BREATH): ICD-10-CM

## 2024-11-18 RX ORDER — ASPIRIN 81 MG/1
243 TABLET, CHEWABLE ORAL ONCE
OUTPATIENT
Start: 2024-11-18

## 2024-11-18 RX ORDER — ASPIRIN 325 MG
325 TABLET ORAL ONCE
OUTPATIENT
Start: 2024-11-18 | End: 2024-11-18

## 2024-11-18 RX ORDER — SODIUM CHLORIDE 9 MG/ML
INJECTION, SOLUTION INTRAVENOUS CONTINUOUS
OUTPATIENT
Start: 2024-11-18

## 2024-11-18 RX ORDER — POTASSIUM CHLORIDE 1500 MG/1
20 TABLET, EXTENDED RELEASE ORAL
OUTPATIENT
Start: 2024-11-18

## 2024-11-18 RX ORDER — LIDOCAINE 40 MG/G
CREAM TOPICAL
OUTPATIENT
Start: 2024-11-18

## 2024-11-18 NOTE — PROGRESS NOTES
Coronary angiogram/PCI/Right Heart Cath prep instructions.     Patient is scheduled for a Left and Right Heart Cath at [facility] - [address], [place] [address] - Main Entrance of the Hospital on 11/20/24.  Check in time is at 730 and procedure to follow.    Patient instructed to remain NPO for solid foods 8 hours prior to arrival and may have clear liquids up to 2 hours prior to arrival.    Patient does not require extra fluids prior to procedure.    Patient is not diabetic.    Patient is on xarelto (Eliquis, Pradaxa, Xarelto) and has been advised to hold for 2 days prior to procedure starting 11/18/24.  Patient can resume after the procedure.    Patient is taking bumex and has been instructed to hold it the morning of procedure.     Patient is having a Right Heart  did discuss with DR Zuleta and Pt will hold Bumex for procedure, and potassium supplement.     Patient is not currently taking ASA and has been advised to take one 325 mg tablet the day prior and morning of the procedure.    Pt is not on a SGLT2 inhibitor.    Pt is not on a GLP-1 Agonist And has not started GIP- Pt has Tirzepatide, but has not started medication. He will start after angiogram.    Patient advised to take their other daily medications the morning of the procedure with small sips of water.     Patient advised to shower the night before and morning of their procedure with regular soap.    Verified patient does not have a contrast allergy.    Verified patient has someone available to drive them home from the hospital and can stay with them for 24 hours after the procedure.     Patient advised they will have bedrest post procedure.  Length of time is 2-6 hours and dependent on access site used for procedure.  This bedrest is to allow proper clotting of the access site to prevent bleeding.    Patient advised to notify care team with any new COVID like symptoms prior to procedure. Day of procedure phone number:  Melba at [phone]    Patient is aware of visitor policy.    Patient expresses understanding of above instructions and denies further questions at this time.      Charito Pineda RN  [facility]

## 2024-11-19 ENCOUNTER — TELEPHONE (OUTPATIENT)
Dept: CARDIOLOGY | Facility: CLINIC | Age: 74
End: 2024-11-19
Payer: COMMERCIAL

## 2024-11-19 ENCOUNTER — TELEPHONE (OUTPATIENT)
Dept: MEDSURG UNIT | Facility: CLINIC | Age: 74
End: 2024-11-19
Payer: COMMERCIAL

## 2024-11-19 NOTE — TELEPHONE ENCOUNTER
Chart reviewed for upcoming appt.  CSE and nursing orders in [facility] for procedure.

## 2024-11-19 NOTE — TELEPHONE ENCOUNTER
Called patient to follow up after his coronary angiogram was rescheduled to 11/22/24. Asked that patient take a dose of xarelto tonight and then hold 11/20 and 11/21. Reviewed other angiogram instructions as documented by Charito Pineda RN. Patient states understanding and agreement. He is aware of updated date and time. Reviewed that his chart was updated today and shows no prior authorization is needed for his upcoming coronary angiogram/RHC. Patient states understanding.

## 2024-11-19 NOTE — TELEPHONE ENCOUNTER
[facility] Call Center    Phone Message    May a detailed message be left on voicemail: yes     Reason for Call: Other: [employer] is requesting call back ASAP for prior auth of 11/22/24 procedure.     Pt is also requesting a call back regarding hold on xerelto and when it shuld start now that procedure date was changed.    Action Taken: Other: cardio    Travel Screening: Not Applicable       Thank you!  [facility]    Date of Service:

## 2024-11-21 NOTE — TELEPHONE ENCOUNTER
[facility] Call Center    Phone Message    May a detailed message be left on voicemail: yes     Reason for Call: Other: Krystal called and said that per the patient's benefits, this procedure does require an  prior auth, she said that any out patient procedure/surgery requires a PA prior.   Please call the provider line ASAP    Action Taken: Other: Cardiology    Travel Screening: Not Applicable    Thank you!  [facility]       Date of Service:

## 2024-11-22 ENCOUNTER — HOSPITAL ENCOUNTER (OUTPATIENT)
Facility: CLINIC | Age: 74
Discharge: HOME | End: 2024-11-22
Attending: INTERNAL MEDICINE | Admitting: INTERNAL MEDICINE
Payer: COMMERCIAL

## 2024-11-22 VITALS
BODY MASS INDEX: 42.89 KG/M2 | WEIGHT: 283 LBS | HEIGHT: 68 IN | RESPIRATION RATE: 16 BRPM | OXYGEN SATURATION: 94 % | SYSTOLIC BLOOD PRESSURE: 136 MMHG | DIASTOLIC BLOOD PRESSURE: 85 MMHG | TEMPERATURE: 98.3 F | HEART RATE: 60 BPM

## 2024-11-22 DIAGNOSIS — I35.0 NONRHEUMATIC AORTIC VALVE STENOSIS: ICD-10-CM

## 2024-11-22 DIAGNOSIS — R06.02 SOB (SHORTNESS OF BREATH): ICD-10-CM

## 2024-11-22 DIAGNOSIS — I35.0 NONRHEUMATIC AORTIC (VALVE) STENOSIS: ICD-10-CM

## 2024-11-22 PROBLEM — Z98.890 STATUS POST CORONARY ANGIOGRAM: Status: ACTIVE | Noted: 2024-11-22

## 2024-11-22 LAB
ANION GAP SERPL CALCULATED.3IONS-SCNC: 12 MMOL/L (ref 7–15)
APTT PPP: 27 SECONDS (ref 22–38)
ATRIAL RATE - MUSE: 72 BPM
BASE EXCESS BLDA CALC-SCNC: 1 MMOL/L (ref -3–3)
BASE EXCESS BLDV CALC-SCNC: 1 MMOL/L (ref -3–3)
BUN SERPL-MCNC: 14.9 MG/DL (ref 8–23)
CALCIUM SERPL-MCNC: 9.2 MG/DL (ref 8.8–10.4)
CHLORIDE SERPL-SCNC: 103 MMOL/L (ref 98–107)
CREAT SERPL-MCNC: 0.96 MG/DL (ref 0.67–1.17)
DIASTOLIC BLOOD PRESSURE - MUSE: NORMAL MMHG
EGFRCR SERPLBLD CKD-EPI 2021: 83 ML/MIN/1.73M2
ERYTHROCYTE [DISTWIDTH] IN BLOOD BY AUTOMATED COUNT: 14.5 % (ref 10–15)
GLUCOSE SERPL-MCNC: 106 MG/DL (ref 70–99)
HCO3 BLDA-SCNC: 27 MMOL/L (ref 21–28)
HCO3 BLDV-SCNC: 27 MMOL/L (ref 21–28)
HCO3 SERPL-SCNC: 23 MMOL/L (ref 22–29)
HCT VFR BLD AUTO: 46.1 % (ref 40–53)
HGB BLD-MCNC: 15.1 G/DL (ref 13.3–17.7)
INR PPP: 1.1 (ref 0.85–1.15)
INTERPRETATION ECG - MUSE: NORMAL
LACTATE BLD-SCNC: 0.6 MMOL/L
LACTATE BLD-SCNC: 0.7 MMOL/L
MCH RBC QN AUTO: 31.1 PG (ref 26.5–33)
MCHC RBC AUTO-ENTMCNC: 32.8 G/DL (ref 31.5–36.5)
MCV RBC AUTO: 95 FL (ref 78–100)
P AXIS - MUSE: 25 DEGREES
PCO2 BLDA: 49 MM HG (ref 35–45)
PCO2 BLDV: 44 MM HG (ref 40–50)
PH BLDA: 7.35 [PH] (ref 7.35–7.45)
PH BLDV: 7.39 [PH] (ref 7.32–7.43)
PLATELET # BLD AUTO: 226 10E3/UL (ref 150–450)
PO2 BLDA: 54 MM HG (ref 80–105)
PO2 BLDV: 34 MM HG (ref 25–47)
POTASSIUM SERPL-SCNC: 3.8 MMOL/L (ref 3.4–5.3)
PR INTERVAL - MUSE: 194 MS
QRS DURATION - MUSE: 90 MS
QT - MUSE: 418 MS
QTC - MUSE: 457 MS
R AXIS - MUSE: 26 DEGREES
RBC # BLD AUTO: 4.86 10E6/UL (ref 4.4–5.9)
SAO2 % BLDA: 86 % (ref 92–100)
SAO2 % BLDV: 65 % (ref 70–75)
SODIUM SERPL-SCNC: 138 MMOL/L (ref 135–145)
SYSTOLIC BLOOD PRESSURE - MUSE: NORMAL MMHG
T AXIS - MUSE: 90 DEGREES
VENTRICULAR RATE- MUSE: 72 BPM
WBC # BLD AUTO: 7.6 10E3/UL (ref 4–11)

## 2024-11-22 PROCEDURE — 93454 CORONARY ARTERY ANGIO S&I: CPT | Mod: 26 | Performed by: INTERNAL MEDICINE

## 2024-11-22 PROCEDURE — 250N000011 HC RX IP 250 OP 636: Performed by: INTERNAL MEDICINE

## 2024-11-22 PROCEDURE — 99152 MOD SED SAME PHYS/QHP 5/>YRS: CPT | Performed by: INTERNAL MEDICINE

## 2024-11-22 PROCEDURE — 999N000184 HC STATISTIC TELEMETRY

## 2024-11-22 PROCEDURE — 82803 BLOOD GASES ANY COMBINATION: CPT

## 2024-11-22 PROCEDURE — 250N000013 HC RX MED GY IP 250 OP 250 PS 637: Performed by: INTERNAL MEDICINE

## 2024-11-22 PROCEDURE — C1760 CLOSURE DEV, VASC: HCPCS | Performed by: INTERNAL MEDICINE

## 2024-11-22 PROCEDURE — 80048 BASIC METABOLIC PNL TOTAL CA: CPT | Performed by: INTERNAL MEDICINE

## 2024-11-22 PROCEDURE — 999N000071 HC STATISTIC HEART CATH LAB OR EP LAB

## 2024-11-22 PROCEDURE — 272N000001 HC OR GENERAL SUPPLY STERILE: Performed by: INTERNAL MEDICINE

## 2024-11-22 PROCEDURE — C1769 GUIDE WIRE: HCPCS | Performed by: INTERNAL MEDICINE

## 2024-11-22 PROCEDURE — 36415 COLL VENOUS BLD VENIPUNCTURE: CPT | Performed by: INTERNAL MEDICINE

## 2024-11-22 PROCEDURE — 85014 HEMATOCRIT: CPT | Performed by: INTERNAL MEDICINE

## 2024-11-22 PROCEDURE — 85730 THROMBOPLASTIN TIME PARTIAL: CPT | Performed by: INTERNAL MEDICINE

## 2024-11-22 PROCEDURE — C1751 CATH, INF, PER/CENT/MIDLINE: HCPCS | Performed by: INTERNAL MEDICINE

## 2024-11-22 PROCEDURE — 93005 ELECTROCARDIOGRAM TRACING: CPT

## 2024-11-22 PROCEDURE — 250N000009 HC RX 250: Performed by: INTERNAL MEDICINE

## 2024-11-22 PROCEDURE — 999N000054 HC STATISTIC EKG NON-CHARGEABLE

## 2024-11-22 PROCEDURE — 99153 MOD SED SAME PHYS/QHP EA: CPT | Performed by: INTERNAL MEDICINE

## 2024-11-22 PROCEDURE — 85610 PROTHROMBIN TIME: CPT | Performed by: INTERNAL MEDICINE

## 2024-11-22 PROCEDURE — C1894 INTRO/SHEATH, NON-LASER: HCPCS | Performed by: INTERNAL MEDICINE

## 2024-11-22 PROCEDURE — 93456 R HRT CORONARY ARTERY ANGIO: CPT | Performed by: INTERNAL MEDICINE

## 2024-11-22 DEVICE — CLOSURE ANGIOSEAL 6FR 610130: Type: IMPLANTABLE DEVICE | Status: FUNCTIONAL

## 2024-11-22 RX ORDER — POTASSIUM CHLORIDE 1500 MG/1
20 TABLET, EXTENDED RELEASE ORAL
Status: COMPLETED | OUTPATIENT
Start: 2024-11-22 | End: 2024-11-22

## 2024-11-22 RX ORDER — FENTANYL CITRATE 50 UG/ML
INJECTION, SOLUTION INTRAMUSCULAR; INTRAVENOUS
Status: DISCONTINUED | OUTPATIENT
Start: 2024-11-22 | End: 2024-11-22 | Stop reason: HOSPADM

## 2024-11-22 RX ORDER — OXYCODONE HYDROCHLORIDE 5 MG/1
10 TABLET ORAL EVERY 4 HOURS PRN
Status: DISCONTINUED | OUTPATIENT
Start: 2024-11-22 | End: 2024-11-22 | Stop reason: HOSPADM

## 2024-11-22 RX ORDER — NALOXONE HYDROCHLORIDE 0.4 MG/ML
0.2 INJECTION, SOLUTION INTRAMUSCULAR; INTRAVENOUS; SUBCUTANEOUS
Status: CANCELLED | OUTPATIENT
Start: 2024-11-22 | End: 2024-11-22

## 2024-11-22 RX ORDER — ACETAMINOPHEN 325 MG/1
650 TABLET ORAL EVERY 4 HOURS PRN
Status: DISCONTINUED | OUTPATIENT
Start: 2024-11-22 | End: 2024-11-22 | Stop reason: HOSPADM

## 2024-11-22 RX ORDER — SODIUM CHLORIDE 9 MG/ML
INJECTION, SOLUTION INTRAVENOUS CONTINUOUS
Status: DISCONTINUED | OUTPATIENT
Start: 2024-11-22 | End: 2024-11-22 | Stop reason: HOSPADM

## 2024-11-22 RX ORDER — LIDOCAINE 40 MG/G
CREAM TOPICAL
Status: DISCONTINUED | OUTPATIENT
Start: 2024-11-22 | End: 2024-11-22 | Stop reason: HOSPADM

## 2024-11-22 RX ORDER — FLUMAZENIL 0.1 MG/ML
0.2 INJECTION, SOLUTION INTRAVENOUS
Status: CANCELLED | OUTPATIENT
Start: 2024-11-22 | End: 2024-11-22

## 2024-11-22 RX ORDER — HEPARIN SODIUM 10000 [USP'U]/100ML
100-1000 INJECTION, SOLUTION INTRAVENOUS CONTINUOUS PRN
Status: DISCONTINUED | OUTPATIENT
Start: 2024-11-22 | End: 2024-11-22 | Stop reason: HOSPADM

## 2024-11-22 RX ORDER — ASPIRIN 81 MG/1
243 TABLET, CHEWABLE ORAL ONCE
Status: COMPLETED | OUTPATIENT
Start: 2024-11-22 | End: 2024-11-22

## 2024-11-22 RX ORDER — ATROPINE SULFATE 0.1 MG/ML
0.5 INJECTION INTRAVENOUS
Status: CANCELLED | OUTPATIENT
Start: 2024-11-22 | End: 2024-11-22

## 2024-11-22 RX ORDER — FENTANYL CITRATE 50 UG/ML
25 INJECTION, SOLUTION INTRAMUSCULAR; INTRAVENOUS
Status: CANCELLED | OUTPATIENT
Start: 2024-11-22

## 2024-11-22 RX ORDER — NALOXONE HYDROCHLORIDE 0.4 MG/ML
0.4 INJECTION, SOLUTION INTRAMUSCULAR; INTRAVENOUS; SUBCUTANEOUS
Status: CANCELLED | OUTPATIENT
Start: 2024-11-22 | End: 2024-11-22

## 2024-11-22 RX ORDER — ASPIRIN 325 MG
325 TABLET ORAL ONCE
Status: COMPLETED | OUTPATIENT
Start: 2024-11-22 | End: 2024-11-22

## 2024-11-22 RX ORDER — OXYCODONE HYDROCHLORIDE 5 MG/1
5 TABLET ORAL EVERY 4 HOURS PRN
Status: DISCONTINUED | OUTPATIENT
Start: 2024-11-22 | End: 2024-11-22 | Stop reason: HOSPADM

## 2024-11-22 RX ORDER — IOPAMIDOL 755 MG/ML
INJECTION, SOLUTION INTRAVASCULAR
Status: DISCONTINUED | OUTPATIENT
Start: 2024-11-22 | End: 2024-11-22 | Stop reason: HOSPADM

## 2024-11-22 RX ADMIN — POTASSIUM CHLORIDE 20 MEQ: 1500 TABLET, EXTENDED RELEASE ORAL at 09:51

## 2024-11-22 ASSESSMENT — ACTIVITIES OF DAILY LIVING (ADL)
ADLS_ACUITY_SCORE: 0

## 2024-11-22 NOTE — PRE-PROCEDURE
GENERAL PRE-PROCEDURE:   Procedure:  Coronary angiogram, right heart cath  Date/Time:  11/22/2024 10:05 AM    Verbal consent obtained?: Yes    Written consent obtained?: Yes    Risks and benefits: Risks, benefits and alternatives were discussed    Consent given by:  Patient  Patient states understanding of procedure being performed: Yes    Patient's understanding of procedure matches consent: Yes    Procedure consent matches procedure scheduled: Yes    Expected level of sedation:  Moderate  Appropriately NPO:  Yes  ASA Class:  3  Mallampati  :  Grade 3- soft palate visible, posterior pharyngeal wall not visible  Lungs:  Lungs clear with good breath sounds bilaterally  Heart:  Normal heart sounds and rate  History & Physical reviewed:  History and physical reviewed and no updates needed  Statement of review:  I have reviewed the lab findings, diagnostic data, medications, and the plan for sedation

## 2024-11-22 NOTE — PROGRESS NOTES
Care Suites Discharge Nursing Note    Patient Information  Name: Mike Schultz  Age: 74 year old    Discharge Education:  Discharge instructions reviewed: Yes  Additional education/resources provided:   Patient/patient representative verbalizes understanding: Yes  Patient discharging on new medications: No  Medication education completed: N/A    Discharge Plans:   Discharge location: home  Discharge ride contacted: Yes  Approximate discharge time: 1320    Discharge Criteria:  Discharge criteria met and vital signs stable: Yes    Patient Belongs:  Patient belongings returned to patient: Yes    Mireya Alejo RN

## 2024-11-22 NOTE — Clinical Note
Hemodynamic equipment used: 5 lead ECG, [id number] With 3 Leads, Machine BP Cuff and pulse oximeter probe.

## 2024-11-22 NOTE — PROGRESS NOTES
Pt up - walked hallway > to restroom > + urination   Groin site remains dry and intact   Denies any CP -SOB

## 2024-11-22 NOTE — DISCHARGE INSTRUCTIONS
Cardiac Angiogram Discharge Instructions - Femoral    After you go home:    Have an adult stay with you until tomorrow.  Drink extra fluids for 2 days.  You may resume your normal diet.  No smoking       For 24 hours - due to the sedation you received:  Relax and take it easy.  Do NOT make any important or legal decisions.  Do NOT drive or operate machines at home or at work.  Do NOT drink alcohol.    Care of Groin Puncture Site:    For the first 24 hrs - check the puncture site every 1-2 hours while awake.  For 2 days, when you cough, sneeze, laugh or move your bowels, hold your hand over the puncture site and press firmly.  Remove the bandaid after 24 hours. If there is minor oozing, apply another bandaid and remove it after 12 hours.  It is normal to have a small bruise or pea size lump at the site.  You may shower tomorrow. Do NOT take a bath, or use a hot tub or pool for at least 3 days. Do NOT scrub the site. Do not use lotion or powder near the puncture site.    Activity:            For 2 days:  No stooping or squatting  Do NOT do any heavy activity such as exercise, lifting, or straining.   No housework, yard work or any activity that make you sweat  Do NOT lift more than 10 pounds    Bleeding:    If you start bleeding from the site in your groin, lie down flat and press firmly on/above the site for 10 minutes.   Once bleeding stops, lay flat for 2 hours.   Call [facility] as soon as you can.       Call 911 right away if you have heavy bleeding or bleeding that does not stop.      Medicines:    If you are taking an antiplatelet medication such as Plavix, Brilinta or Effient, do not stop taking it until you talk to your cardiologist.    If you are on Metformin (Glucophage), do not restart it until you have blood tests (within 2 to 3 days after discharge).  After you have your blood drawn, you may restart the Metformin.   Take your medications, including blood thinners, unless your provider tells you not to.     If you take Coumadin (Warfarin), have your INR checked by your provider in  3-5 days. Call your clinic to schedule this.  If you have stopped any medicines, check with your provider about when to restart them.    Follow Up Appointments:    Follow up with [facility] Nurse Practitioner at [facility] of patient preference in 7-10 days.    Call the clinic if:    You have increased pain or a large or growing hard lump around the site.  The site is red, swollen, hot or tender.  Blood or fluid is draining from the site.  You have chills or a fever greater than 101 F (38 C).  Your leg feels numb, cool or changes color.  You have hives, a rash or unusual itching.  New pain in the back or belly that you cannot control with Tylenol.  Any questions or concerns.          [facility] [place]:    [phone] [facility] (7 days a week)    Or you may contact your provider via My Chart

## 2024-11-25 ENCOUNTER — TELEPHONE (OUTPATIENT)
Dept: CARDIOLOGY | Facility: CLINIC | Age: 74
End: 2024-11-25
Payer: COMMERCIAL

## 2024-11-25 NOTE — TELEPHONE ENCOUNTER
Patient to see Dr. Jiang on 12/5 at 9:45 AM for TAVR workup/bailout conversation. Patient stated groin site from angiogram has no hematoma, pain or redness. Patient denies any concerns with groin site. Patient OV with Gloria Bailon CNP cancelled on 11/29 and to be rescheduled when AVR timing is known. Patient verbalized understanding and in agreement with plan.

## 2024-12-02 ENCOUNTER — LAB (OUTPATIENT)
Dept: LAB | Facility: CLINIC | Age: 74
End: 2024-12-02
Payer: COMMERCIAL

## 2024-12-02 DIAGNOSIS — N18.31 CHRONIC KIDNEY DISEASE (CKD) STAGE G3A/A1, MODERATELY DECREASED GLOMERULAR FILTRATION RATE (GFR) BETWEEN 45-59 ML/MIN/1.73 SQUARE METER AND ALBUMINURIA CREATININE RATIO LESS THAN 30 MG/G (H): ICD-10-CM

## 2024-12-02 DIAGNOSIS — I63.9 ACUTE CVA (CEREBROVASCULAR ACCIDENT) (H): ICD-10-CM

## 2024-12-02 DIAGNOSIS — E11.65 TYPE 2 DIABETES MELLITUS WITH HYPERGLYCEMIA, WITHOUT LONG-TERM CURRENT USE OF INSULIN (H): ICD-10-CM

## 2024-12-02 LAB
ALBUMIN SERPL BCG-MCNC: 4 G/DL (ref 3.5–5.2)
ANION GAP SERPL CALCULATED.3IONS-SCNC: 13 MMOL/L (ref 7–15)
BUN SERPL-MCNC: 11.2 MG/DL (ref 8–23)
CALCIUM SERPL-MCNC: 8.9 MG/DL (ref 8.8–10.4)
CHLORIDE SERPL-SCNC: 99 MMOL/L (ref 98–107)
CHOLEST SERPL-MCNC: 140 MG/DL
CREAT SERPL-MCNC: 0.95 MG/DL (ref 0.67–1.17)
CREAT UR-MCNC: 15.7 MG/DL
EGFRCR SERPLBLD CKD-EPI 2021: 85 ML/MIN/1.73M2
FASTING STATUS PATIENT QL REPORTED: YES
GLUCOSE SERPL-MCNC: 112 MG/DL (ref 70–99)
HCO3 SERPL-SCNC: 26 MMOL/L (ref 22–29)
HDLC SERPL-MCNC: 39 MG/DL
LDLC SERPL CALC-MCNC: 75 MG/DL
MICROALBUMIN UR-MCNC: <12 MG/L
MICROALBUMIN/CREAT UR: NORMAL MG/G{CREAT}
NONHDLC SERPL-MCNC: 101 MG/DL
PHOSPHATE SERPL-MCNC: 3.2 MG/DL (ref 2.5–4.5)
POTASSIUM SERPL-SCNC: 3.5 MMOL/L (ref 3.4–5.3)
SODIUM SERPL-SCNC: 138 MMOL/L (ref 135–145)
TRIGL SERPL-MCNC: 129 MG/DL

## 2024-12-02 PROCEDURE — 82570 ASSAY OF URINE CREATININE: CPT

## 2024-12-02 PROCEDURE — 80061 LIPID PANEL: CPT

## 2024-12-02 PROCEDURE — 82043 UR ALBUMIN QUANTITATIVE: CPT

## 2024-12-02 PROCEDURE — 36415 COLL VENOUS BLD VENIPUNCTURE: CPT

## 2024-12-02 PROCEDURE — 80069 RENAL FUNCTION PANEL: CPT

## 2024-12-04 ENCOUNTER — VIRTUAL VISIT (OUTPATIENT)
Dept: SPEECH THERAPY | Facility: CLINIC | Age: 74
End: 2024-12-04
Payer: COMMERCIAL

## 2024-12-04 DIAGNOSIS — I63.9 ACUTE CVA (CEREBROVASCULAR ACCIDENT) (H): ICD-10-CM

## 2024-12-04 DIAGNOSIS — R47.1 DYSARTHRIA: Primary | ICD-10-CM

## 2024-12-04 LAB
ABO/RH(D): NORMAL
ANTIBODY SCREEN: NEGATIVE
SPECIMEN EXPIRATION DATE: NORMAL

## 2024-12-04 NOTE — PATIENT INSTRUCTIONS
Stretches, Relaxation, Strategies to Reduce Synkinesis       Go Blah  Active Facial Relaxation  With this strategy, you are going to use your mind to relax your face.    Allow your jaw to drop down as if it is hanging like a hammock between two trees   Allow your back teeth to part slightly   Open your lips slightly as it feels comfortable   Imaging your entire face is heavy and hanging downward   Consider using relaxing visual imagery to help   Relax in this manner for 5-10 seconds intermittently throughout the day      #1. Advanced Hook   Place the pointer and index fingers (of the unaffected side) deep inside your cheek   Using your fingers, stretch the muscle outward, pusing it in the direction away from your teeth   Like a hook   Hold the stretch for 60 seconds   Relax the facial muscles   Slowly remove the hand and just let everything hang loose for 5 seconds   Place the pointer and index fingers (of the unaffected side) inside your cheek but closer to the corner of your mouth this time or where it feels most tight   Using your fingers, stretch the muscle outward, pusing it in the direction away from your teeth   Like a hook   Hold the stretch for 60 seconds   Relax the facial muscles   Slowly remove the hand and just let everything hang loose for 5 seconds      #2. Wheel Massage   Consider your face a wheel and you are massaging the spokes of a wheel, from the outer rim to the center .  In that way, you will massage the entire side of your face in sections.  As you move along, if you find painful points, maintain the pressure on it until the pain diminishes, then continue on with the stretch.  For the best massage technique, you will want to use the hand that is opposite of your facial tightness.  For each section, stretch slowly, for ~8-10 seconds per area.  You will repeat each section three times in each muscle section or go around the half [place] of stretches 3 times (one group at a time).    For the  first section, place your thumb in your mouth in front of your upper teeth and two or three fingers on the nose   Pull the tissue down with two or three fingers until you meet your thumb   Press together (from the inside and outside) while you stretch your face toward the center of your lips.    For this section, pull in a slight arc around your nostril and pull all the way through the lip.    The second section t is located under your eye, but do NOT pull the lower lid down  Start below the eye and pull straight towards the center of the lips   The third section is located at the temple where you will pull downward toward the center of the lips   For the fourth section, is located in front of your ear   Pull horizontally to the center of the lips   The final section is located under the jaw but more towards the affected side  Pull upward to the center of the lips   The thumb is inside between the front of the lower teeth and inside the chin     Exercises to Improve Smile      #3. Active Assisted to Active Snarl  Using one finger placed flat and vertically next to nose (with the tip of the finger even with the top of the nostril  Gently assist this area to move straight upward while you use your muscles on both sides to make a  stinky face   Wrinkle the nose hard, trying to expose your upper teeth or gums equally on both sides  Hold for 5 seconds   Slowly remove the finger and keep snarling for another 3-5 seconds   Relax   Repeat 10-20x; discontinue before 20 if the muscles feel tired         #4. Active Assisted Smile   Place four fingers or knuckles on an angle from the corner of the mouth up toward the cheek bone  Smile big on both sides, and at the same time, gently assist the smile on the affected side to look like the unaffected side   Hold for 3-5 seconds   Relax   Repeat 10-20x; discontinue before 20 if the muscles feel tired

## 2024-12-05 ENCOUNTER — OFFICE VISIT (OUTPATIENT)
Dept: CARDIOLOGY | Facility: CLINIC | Age: 74
End: 2024-12-05
Payer: COMMERCIAL

## 2024-12-05 ENCOUNTER — LAB (OUTPATIENT)
Dept: LAB | Facility: CLINIC | Age: 74
End: 2024-12-05
Payer: COMMERCIAL

## 2024-12-05 VITALS
SYSTOLIC BLOOD PRESSURE: 127 MMHG | OXYGEN SATURATION: 96 % | HEIGHT: 68 IN | DIASTOLIC BLOOD PRESSURE: 78 MMHG | WEIGHT: 289.2 LBS | BODY MASS INDEX: 43.83 KG/M2 | HEART RATE: 61 BPM

## 2024-12-05 DIAGNOSIS — I63.9 ACUTE CVA (CEREBROVASCULAR ACCIDENT) (H): ICD-10-CM

## 2024-12-05 DIAGNOSIS — N18.32 STAGE 3B CHRONIC KIDNEY DISEASE (H): ICD-10-CM

## 2024-12-05 DIAGNOSIS — I50.9 ACUTE ON CHRONIC CONGESTIVE HEART FAILURE, UNSPECIFIED HEART FAILURE TYPE (H): ICD-10-CM

## 2024-12-05 DIAGNOSIS — I35.0 NONRHEUMATIC AORTIC (VALVE) STENOSIS: Primary | ICD-10-CM

## 2024-12-05 DIAGNOSIS — I35.0 NONRHEUMATIC AORTIC (VALVE) STENOSIS: ICD-10-CM

## 2024-12-05 DIAGNOSIS — I48.0 PAROXYSMAL ATRIAL FIBRILLATION (H): ICD-10-CM

## 2024-12-05 LAB
ALBUMIN SERPL BCG-MCNC: 3.8 G/DL (ref 3.5–5.2)
ALP SERPL-CCNC: 192 U/L (ref 40–150)
ALT SERPL W P-5'-P-CCNC: 14 U/L (ref 0–70)
ANION GAP SERPL CALCULATED.3IONS-SCNC: 9 MMOL/L (ref 7–15)
AST SERPL W P-5'-P-CCNC: 21 U/L (ref 0–45)
BILIRUB SERPL-MCNC: 0.4 MG/DL
BLOOD BANK CHART COMMENT: NORMAL
BUN SERPL-MCNC: 12 MG/DL (ref 8–23)
CALCIUM SERPL-MCNC: 9.2 MG/DL (ref 8.8–10.4)
CHLORIDE SERPL-SCNC: 103 MMOL/L (ref 98–107)
CREAT SERPL-MCNC: 0.89 MG/DL (ref 0.67–1.17)
EGFRCR SERPLBLD CKD-EPI 2021: 90 ML/MIN/1.73M2
ERYTHROCYTE [DISTWIDTH] IN BLOOD BY AUTOMATED COUNT: 14.5 % (ref 10–15)
GLUCOSE SERPL-MCNC: 115 MG/DL (ref 70–99)
HCO3 SERPL-SCNC: 27 MMOL/L (ref 22–29)
HCT VFR BLD AUTO: 45.9 % (ref 40–53)
HGB BLD-MCNC: 14.9 G/DL (ref 13.3–17.7)
INR PPP: 1.89 (ref 0.85–1.15)
MCH RBC QN AUTO: 31.2 PG (ref 26.5–33)
MCHC RBC AUTO-ENTMCNC: 32.5 G/DL (ref 31.5–36.5)
MCV RBC AUTO: 96 FL (ref 78–100)
NT-PROBNP SERPL-MCNC: 407 PG/ML (ref 0–900)
PLATELET # BLD AUTO: 230 10E3/UL (ref 150–450)
POTASSIUM SERPL-SCNC: 4.5 MMOL/L (ref 3.4–5.3)
PROT SERPL-MCNC: 6.9 G/DL (ref 6.4–8.3)
RBC # BLD AUTO: 4.78 10E6/UL (ref 4.4–5.9)
SODIUM SERPL-SCNC: 139 MMOL/L (ref 135–145)
SPECIMEN EXPIRATION DATE: NORMAL
WBC # BLD AUTO: 7.8 10E3/UL (ref 4–11)

## 2024-12-05 PROCEDURE — 86900 BLOOD TYPING SEROLOGIC ABO: CPT

## 2024-12-05 PROCEDURE — 83880 ASSAY OF NATRIURETIC PEPTIDE: CPT

## 2024-12-05 PROCEDURE — 36415 COLL VENOUS BLD VENIPUNCTURE: CPT

## 2024-12-05 PROCEDURE — 85018 HEMOGLOBIN: CPT

## 2024-12-05 PROCEDURE — 84295 ASSAY OF SERUM SODIUM: CPT

## 2024-12-05 PROCEDURE — 85610 PROTHROMBIN TIME: CPT

## 2024-12-05 PROCEDURE — 80053 COMPREHEN METABOLIC PANEL: CPT

## 2024-12-05 PROCEDURE — 84155 ASSAY OF PROTEIN SERUM: CPT

## 2024-12-05 NOTE — PATIENT INSTRUCTIONS
TAVR PRE-PROCEDURE INSTRUCTIONS:    - Your TAVR is scheduled Tuesday December 10th, 3rd case. Please check-in at 9:30 AM at the Registration Desk in the [facility] at [facility].    - Use the Hibiclens soap I have provided you the night before and morning of the procedure. Wash from chin to toes, please avoid your face and eyes.    - Nothing to eat or drink the morning of your TAVR.     Medication instructions:  - You may take your morning medications with sips of water.   - Hold your Bumex the morning of your TAVR.  - Do not start tirzepatide until after TAVR procedure.  - Please hold all vitamins and supplements the morning of your procedure.  - You will need to hold your Xarelto prior to TAVR, take your last dose on: 12/7/24.   - You will need to take 325mg of aspirin the morning of your TAVR.    -On the day of the procedure, you may have two visitors in the pre-operative area. Two visitors may see you when you get to your room in the Heart Center (2nd floor of [facility]).    - You will stay overnight on Tuesday night. We will monitor you overnight for signs of bleeding, stroke, as well as need for pacemaker. On Wednesday morning, you will have an echo of your new valve and work with cardiac rehab.     It was nice to see you today! Please call with any other questions, concerns, or any changes in your health: [phone]    Haley Barnes RN  Structural Heart Coordinator  [facility]

## 2024-12-05 NOTE — PROGRESS NOTES
Patient was presented this morning at the multidisciplinary valve conference. Plan is to proceed with TAVR procedure.    Valve: Medtronic  Approach: NIA BRADFORD  Sedation: Conscious    Above information was discussed with the patient in office visit today. Patient sent home with pre-op instructions and pre-op labs drawn. Patient stated he has not started tirzepatide.    Haley Barnes RN  Structural Heart Coordinator  [facility]

## 2024-12-05 NOTE — PROGRESS NOTES
Cardiac Surgery TAVR Evaluation    Mike Schultz MRN# [id number]   YOB: 1950 Age: 74 year old            Assessment and Plan:   Mike Schultz is a 74-year-old man with a history of CKD, DM2, obesity, chronic Afib with recent stroke while on Xarelto, and HFpEF who is being evaluated for moderate-severe, symptomatic aortic stenosis. Cardiac catheterization reveals single vessel coronary disease involving a second diagonal coronary artery. We had a conversation about options for aortic valve intervention, which included TAVR and SAVR. I support the recommendation to proceed with transcatheter aortic valve replacement, pending completion of the preoperative workup.     I described the technical details, as well as the expected postoperative course and recovery to the patient. I also discussed the risks and benefits of the procedure. The risks include but are not limited to bleeding, infection, stroke, myocardial infarction, dysrhythmia and need for pacemaker, respiratory failure, kidney or liver injury, bowel or limb ischemia, and death. The patient understands these risks and wishes to undergo the operation.     A sherrell discussion was had with the patient regarding the possible need for surgical bailout with sternotomy, cardiopulmonary bypass, and open heart surgery should a rare complication arise during the TAVR procedure such as valve misdeployment or embolization, cardiac rupture, aortic rupture, or aortic dissection. Though these complications are rare at <1% incidence, they carry a major morbidity and mortality risk of at least 50%. I explained the morbidity among survivors may include dialysis, heart attack, stroke, prolonged ventilation, loss of limb, and loss of independent living situation. The patient stated that they would want all rescue efforts made in the event of a procedural complication during TAVR, and they would consent to undergo emergency open heart surgery and any other  indicated procedures if needed.           History of Present Illness:   Mike Schultz is a 74-year-old man with a history of CKD, DM2, obesity, chronic Afib with recent stroke while on Xarelto, and HFpEF who is being evaluated for moderate-severe, symptomatic aortic stenosis. Cardiac catheterization reveals single vessel coronary disease involving a second diagonal coronary artery.                Past Medical History:     Past Medical History:   Diagnosis Date    (HFpEF) heart failure with preserved ejection fraction (H)     Acute gouty arthritis     Alcohol use disorder in remission     Amiodarone pulmonary toxicity     Aortic stenosis     Basal cell carcinoma     Of the nose    Bell's palsy     Herpes simplex without mention of complication     Hyperlipidemia     Hypertension 2000    Persistent atrial fibrillation (H)     persistent, DCCV 12/2017, ablation 11/6/17    Pulmonary nodules     Sleep apnea     CPAP            Past Surgical History:     Past Surgical History:   Procedure Laterality Date    ABLATION OF DYSRHYTHMIC FOCUS  2018, 04/12/2019    Procedure: EP Ablation Focal AFIB;  Surgeon: Fady Day MD;  Location:  HEART CARDIAC CATH LAB    ACHILLES TENDON SURGERY  1997    ANESTHESIA CARDIOVERSION N/A 2/26/2019    Procedure: ANESTHESIA CARDIOVERSION (CONNER);  Surgeon: GENERIC ANESTHESIA PROVIDER;  Location:  OR    ANESTHESIA CARDIOVERSION N/A 11/19/2020    Procedure: ANESTHESIA, FOR CARDIOVERSION (dr campbell);  Surgeon: GENERIC ANESTHESIA PROVIDER;  Location:  OR    ANESTHESIA CARDIOVERSION N/A 8/22/2022    Procedure: CARDIOVERSION;  Surgeon: GENERIC ANESTHESIA PROVIDER;  Location:  OR    ANESTHESIA CARDIOVERSION N/A 9/13/2023    Procedure: Anesthesia cardioversion;  Surgeon: GENERIC ANESTHESIA PROVIDER;  Location:  OR    ANESTHESIA CARDIOVERSION N/A 10/6/2023    Procedure: Anesthesia cardioversion;  Surgeon: GENERIC ANESTHESIA PROVIDER;  Location:  OR    ANESTHESIA CARDIOVERSION N/A 8/1/2024     Procedure: Anesthesia cardioversion;  Surgeon: GENERIC ANESTHESIA PROVIDER;  Location:  OR    BASAL CELL CARCINOMA EXCISION Right 2009    nose, took cartilage from inner ear.    CARDIOVERSION  2012, 2019    cardioversion for atr fib    COLONOSCOPY  2008    COLONOSCOPY N/A 3/22/2023    Procedure: COLONOSCOPY, FLEXIBLE, WITH LESION REMOVAL USING SNARE;  Surgeon: Anne Lucero MD;  Location:  GI    CV CORONARY ANGIOGRAM N/A 11/22/2024    Procedure: Coronary Angiogram;  Surgeon: Murphy Mccann MD;  Location:  HEART CARDIAC CATH LAB    CV RIGHT HEART CATH MEASUREMENTS RECORDED N/A 7/31/2024    Procedure: Right Heart Cath;  Surgeon: Adriano Lazo MD;  Location:  HEART CARDIAC CATH LAB    CV RIGHT HEART CATH MEASUREMENTS RECORDED N/A 11/22/2024    Procedure: Right Heart Catheterization;  Surgeon: Murphy Mccann MD;  Location:  HEART CARDIAC CATH LAB    EP ABLATION FOCAL AFIB N/A 4/12/2019    Procedure: EP Ablation Focal AFIB;  Surgeon: Fady Day MD;  Location:  HEART CARDIAC CATH LAB    EP ABLATION FOCAL AFIB N/A 5/2/2024    Procedure: Ablation Atrial Fibrilation;  Surgeon: Fady Day MD;  Location:  HEART CARDIAC CATH LAB    HERNIA REPAIR, UMBILICAL  08/20/2012    Procedure: HERNIORRHAPHY UMBILICAL;  UMBILICAL HERNIA REPAIR;  Surgeon: Ra Crocker MD;  Location: [facility]    HERNIORRHAPHY UMBILICAL  8/20/2012    Procedure: HERNIORRHAPHY UMBILICAL;  UMBILICAL HERNIA REPAIR;  Surgeon: Ra Crocker MD;  Location: [facility]    ORTHOPEDIC SURGERY      ORTHOPEDIC SURGERY      OTHER SURGICAL HISTORY Right 03/2017    total KNEE ARTHROSCOPY     TOTAL KNEE ARTHROPLASTY Left 03/16/2018    ZZC NONSPECIFIC PROCEDURE  1997    achilles tendon    ZZC NONSPECIFIC PROCEDURE      knees, arthroscopy    ZZC NONSPECIFIC PROCEDURE      basal cell skin ca, nose    ZZC NONSPECIFIC PROCEDURE      flex sig; colonoscopy due 3/2008    ZZC NONSPECIFIC PROCEDURE  2012    cardioversion for  atr fib    ZZC TOTAL KNEE ARTHROPLASTY Left 2018    Dr. Malick Suarez             Family History:     Family History   Problem Relation Age of Onset    Family History Negative Mother     Heart Disease Father         decesased at 42 - MI    Heart Disease Sister          MI    Lipids Sister     Lipids Sister     No Known Problems Sister     Heart Disease Brother          MI    Heart Disease Brother         CABG AT 49    Lung Cancer No family hx of               Social History:     Social History     Socioeconomic History    Marital status: Single     Spouse name: Not on file    Number of children: Not on file    Years of education: Not on file    Highest education level: Not on file   Occupational History    Not on file   Tobacco Use    Smoking status: Former     Current packs/day: 0.00     Average packs/day: 1 pack/day for 34.6 years (34.6 ttl pk-yrs)     Types: Cigarettes     Start date:      Quit date: 2011     Years since quittin.3     Passive exposure: Past    Smokeless tobacco: Never    Tobacco comments:     quit    Vaping Use    Vaping status: Never Used   Substance and Sexual Activity    Alcohol use: Not Currently    Drug use: Never    Sexual activity: Not Currently     Partners: Female     Birth control/protection: None   Other Topics Concern    Parent/sibling w/ CABG, MI or angioplasty before 65F 55M? Not Asked   Social History Narrative    Worked in retail () and insurance sales     Spends some time on [place] on [place]     Social Drivers of Health     Financial Resource Strain: Not on file   Food Insecurity: Not on file   Transportation Needs: Not on file   Physical Activity: Not on file   Stress: Not on file   Social Connections: Not on file   Interpersonal Safety: Low Risk  (2024)    Interpersonal Safety     Do you feel physically and emotionally safe where you currently live?: Yes     Within the past 12 months, have you been hit, slapped, kicked  or otherwise physically hurt by someone?: No     Within the past 12 months, have you been humiliated or emotionally abused in other ways by your partner or ex-partner?: No   Housing Stability: Not on file              Allergies:     Allergies   Allergen Reactions    Amiodarone Shortness Of Breath     Suspected amiodarone toxicity involving lungs              Medications:     Current Outpatient Medications   Medication Sig Dispense Refill    allopurinol (ZYLOPRIM) 300 MG tablet TAKE ONE TABLET (300 MG) BY MOUTH ONE TIME DAILY 90 tablet 0    atorvastatin (LIPITOR) 40 MG tablet Take 0.5 tablets (20 mg) by mouth every evening. 45 tablet 3    bumetanide (BUMEX) 1 MG tablet 2 mg am and 2 mg pm 360 tablet 1    cetirizine (ZYRTEC) 10 MG tablet Take 10 mg by mouth every evening      finasteride (PROPECIA) 1 MG tablet Take 1 tablet (1 mg) by mouth daily 90 tablet 2    losartan (COZAAR) 25 MG tablet Take 0.5 tablets (12.5 mg) by mouth daily. 45 tablet 2    potassium chloride pushpa ER (KLOR-CON M20) 20 MEQ CR tablet Take 3 tablets (60 mEq) by mouth 2 times daily. 180 tablet 3    rivaroxaban ANTICOAGULANT (XARELTO ANTICOAGULANT) 20 MG TABS tablet Take 1 tablet (20 mg) by mouth daily (with dinner) 90 tablet 3    Tirzepatide 2.5 MG/0.5ML SOAJ Inject 0.5 mLs (2.5 mg) subcutaneously every 7 days. (Patient not taking: Reported on 12/5/2024) 2 mL 2     No current facility-administered medications for this visit.             Review of Systems:   ROS: 10 point review of systems was performed and negative except as described above.         Physical Exam:   There were no vitals taken for this visit.   Alert, pleasant, in no acute distress  Sclera anicteric  Neck supple JVD flat  Trachea midline  No prior chest incisions  Breathing unlabored on room air  Regular rate and rhythm  Abdomen soft, non-tender  Extremities warm, no edema          Labs:   CBC RESULTS:   Recent Labs   Lab Test 11/22/24  0906   WBC 7.6   RBC 4.86   HGB 15.1   HCT  46.1   MCV 95   MCH 31.1   MCHC 32.8   RDW 14.5         Last Comprehensive Metabolic Panel:  Lab Results   Component Value Date     12/02/2024    POTASSIUM 3.5 12/02/2024    CHLORIDE 99 12/02/2024    CO2 26 12/02/2024    ANIONGAP 13 12/02/2024     (H) 12/02/2024    BUN 11.2 12/02/2024    CR 0.95 12/02/2024    GFRESTIMATED 85 12/02/2024    ASHELY 8.9 12/02/2024            Imaging:   All imaging studies were reviewed and interpreted by me.    TTE 9/6/24:  The visual ejection fraction is 55-60%.  No regional wall motion abnormalities noted.  Moderate to severe valvular aortic stenosis, mean gradient 28mmHg.    TAVR CTA 9/20/24:  FINDINGS:     1.  Severely calcified trileafletaortic valve. Aortic valve calcium  score is 3709. The LVOT is not calcified. The ascending aorta is  mildly calcified, aortic arch is  mildly calcified, the descending  thoracic aorta is mildly calcified.   2.  The arch vessel branching pattern is normal.   3.  The suprarenal abdominal aorta is mildly calcified; infrarenal  abdominal aorta is moderately calcified  4.  Widely patent origins of celiac trunk, superior mesenteric artery  and inferior mesenteric artery.  Single bilateral renal arteries with  widely patent origins.   5.  There is no acute aortic pathology, such as dissection, intramural  hematoma, or contained rupture.      MEASUREMENTS:   Representative dimensions of the thoracoabdominal aorta are as  follows:     1. AORTIC ANNULUS MEASUREMENTS:     1.  The average aortic annulus diameter is 25.6 mm, (long diameter is  28.1 mm and short diameter is 23.5 mm)  2.  Aortic annulus area is 5.17 cm2  3.  Aortic annulus perimeter is 82.2 mm  4.  Suggested 3-cusp coaxial angle for aortic valve is ([place] 7 , CAU 5)  and alternate A-P coaxial angle is ([place] 13 , CRA  5 ), these  angles  will vary depending upon the patient's body position  5.  Aortic root angle is 58.4  6.  Left main - Annulus distance: 9.66 mm, RCA - Annulus  distance:  14.6 mm     2. LVOT MEASUREMENTS:     1.  The average LVOT diameter (measured 4 mm below annular plane) is  25.9 mm  2.  LVOT area is 5.28 cm2  3.  LVOT perimeter is 83.2 mm     3. AORTA MEASUREMENTS     1.  The aortic root at the sinuses of Valsalva (left cusp, right cusp,  non cusp): 34.7 mm x  31.5 mm x 34.3 mm  2.  The sinotubular junction:  31.5 mm x 30.4 mm  3.  Sinotubular junction height: 24.6 mm x 21.3 mm  4.  Proximal ascending aorta: 41.4 mm x 40.6 mm  5.  Distal abdominal aorta proximal to the bifurcation: 17.4 mm x 17.4  mm  6.  Innominate artery 3 cm from ostium: 15.2 mm x 14.3 mm  7.  Left common carotid artery 3 cm from ostium: 6.86 mm x 6.46 mm     4. ILIOFEMORAL MEASUREMENTS:     RIGHT:  1.  Right common iliac artery: 15.2 mm x 11.7 mm   2.  Right external iliac artery: 9.54 mm x 7.76 mm   3.  Right common femoral artery: 9.73 mm x 8.32 mm   4.  Tortuosity: mild   5.  Calcification: moderate      LEFT:  1.  Left common iliac artery: 11.4 mm x  7.82 mm  2.  Left external iliac artery: 7.48 mm x 6.79 mm  3.  Left common femoral artery: 10.8 mm x 9.46 mm  4.  Tortuosity: mild   5.  Calcification: moderate     Cardiac catheterization 11/22/24:    2nd Diag lesion is 75% stenosed.        1.  Single-vessel occlusive coronary artery disease namely sidebranch ostial second diagonal   2.  Major epicardial vessels without significant obstructive disease  3.  Right heart cath as follows:                                                             RA:  8/5/3                                                             RV:  48/1/8                                                             PA  46/18/29                                                             PCWP:  13/19/13                                                             CO:  4.17 L/min                                                             CI:  1.77 L/min/m2                                                             PA sat:  65%                                                              FA sat:  97%        Kaela Deleon MD

## 2024-12-06 RX ORDER — IBUPROFEN 200 MG
400 TABLET ORAL EVERY 4 HOURS PRN
COMMUNITY

## 2024-12-06 RX ORDER — ASPIRIN 81 MG/1
324 TABLET ORAL ONCE
Status: ON HOLD | COMMUNITY
End: 2024-12-11

## 2024-12-06 NOTE — PROGRESS NOTES
PTA medications updated by Medication Scribe prior to surgery via phone call with patient (last doses completed by Nurse)     Medication history sources: Patient, Surescripts, and H&P  In the past week, patient estimated taking medication this percent of the time: Greater than 90%      Significant changes made to the medication list:  None      Additional medication history information:   None    Medication reconciliation completed by provider prior to medication history? No    Time spent in this activity: 30 minutes    The information provided in this note is only as accurate as the sources available at the time of update(s)      Prior to Admission medications    Medication Sig Last Dose Taking? Auth Provider Long Term End Date   aspirin 81 MG EC tablet Take 324 mg by mouth once. ([id number])  Yes Reported, Patient     ibuprofen (ADVIL/MOTRIN) 200 MG tablet Take 400 mg by mouth every 4 hours as needed for pain ([id number]). 12/5/2024 Yes Reported, Patient No    Vitamin D3 (CHOLECALCIFEROL) 125 MCG (5000 UT) tablet Take 125 mcg by mouth daily. Morning Yes Reported, Patient     allopurinol (ZYLOPRIM) 300 MG tablet TAKE ONE TABLET (300 MG) BY MOUTH ONE TIME DAILY Morning Yes Ranjan Courtney MD     atorvastatin (LIPITOR) 40 MG tablet Take 0.5 tablets (20 mg) by mouth every evening. Evening Yes Ranjan Courtney MD Yes    bumetanide (BUMEX) 1 MG tablet 2 mg am and 2 mg pm Evening Yes Svetlana Cho APRN CNP Yes    cetirizine (ZYRTEC) 10 MG tablet Take 10 mg by mouth every evening Evening Yes Reported, Patient     finasteride (PROPECIA) 1 MG tablet Take 1 tablet (1 mg) by mouth daily Morning Yes Rnajan Courtney MD     losartan (COZAAR) 25 MG tablet Take 0.5 tablets (12.5 mg) by mouth daily. Morning Yes Natalie Campos PA-C Yes    potassium chloride pushpa ER (KLOR-CON M20) 20 MEQ CR tablet Take 3 tablets (60 mEq) by mouth 2 times daily. Morning Yes Ranjan Courtney MD     rivaroxaban  ANTICOAGULANT (XARELTO ANTICOAGULANT) 20 MG TABS tablet Take 1 tablet (20 mg) by mouth daily (with dinner) Evening Yes Natalie Campos PA-C Yes    Tirzepatide 2.5 MG/0.5ML SOAJ Inject 0.5 mLs (2.5 mg) subcutaneously every 7 days.  Yes Ranjan Courtney MD         Medication history completed by: Victor Manuel Quinonez

## 2024-12-10 ENCOUNTER — HOSPITAL ENCOUNTER (INPATIENT)
Dept: CARDIOLOGY | Facility: CLINIC | Age: 74
Discharge: HOME | DRG: 267 | End: 2024-12-10
Attending: INTERNAL MEDICINE
Payer: COMMERCIAL

## 2024-12-10 ENCOUNTER — HOSPITAL ENCOUNTER (INPATIENT)
Facility: CLINIC | Age: 74
LOS: 1 days | Discharge: HOME | DRG: 267 | End: 2024-12-11
Attending: INTERNAL MEDICINE | Admitting: INTERNAL MEDICINE
Payer: COMMERCIAL

## 2024-12-10 DIAGNOSIS — I35.0 SEVERE AORTIC STENOSIS: Primary | ICD-10-CM

## 2024-12-10 DIAGNOSIS — I35.0 NONRHEUMATIC AORTIC (VALVE) STENOSIS: ICD-10-CM

## 2024-12-10 DIAGNOSIS — Z95.2 S/P TAVR (TRANSCATHETER AORTIC VALVE REPLACEMENT): Primary | ICD-10-CM

## 2024-12-10 LAB
ABO + RH BLD: NORMAL
ACT BLD: 243 SECONDS (ref 74–150)
ACT BLD: 320 SECONDS (ref 74–150)
ATRIAL RATE - MUSE: 69 BPM
BLD GP AB SCN SERPL QL: NEGATIVE
DIASTOLIC BLOOD PRESSURE - MUSE: NORMAL MMHG
GLUCOSE BLDC GLUCOMTR-MCNC: 100 MG/DL (ref 70–99)
GLUCOSE BLDC GLUCOMTR-MCNC: 101 MG/DL (ref 70–99)
GLUCOSE BLDC GLUCOMTR-MCNC: 90 MG/DL (ref 70–99)
INTERPRETATION ECG - MUSE: NORMAL
P AXIS - MUSE: 60 DEGREES
PR INTERVAL - MUSE: 202 MS
QRS DURATION - MUSE: 94 MS
QT - MUSE: 438 MS
QTC - MUSE: 469 MS
R AXIS - MUSE: 56 DEGREES
SPECIMEN EXP DATE BLD: NORMAL
SYSTOLIC BLOOD PRESSURE - MUSE: NORMAL MMHG
T AXIS - MUSE: 88 DEGREES
VENTRICULAR RATE- MUSE: 69 BPM

## 2024-12-10 PROCEDURE — 999N000071 HC STATISTIC HEART CATH LAB OR EP LAB

## 2024-12-10 PROCEDURE — C1725 CATH, TRANSLUMIN NON-LASER: HCPCS | Performed by: INTERNAL MEDICINE

## 2024-12-10 PROCEDURE — C1730 CATH, EP, 19 OR FEW ELECT: HCPCS | Performed by: INTERNAL MEDICINE

## 2024-12-10 PROCEDURE — C1889 IMPLANT/INSERT DEVICE, NOC: HCPCS | Performed by: INTERNAL MEDICINE

## 2024-12-10 PROCEDURE — 93321 DOPPLER ECHO F-UP/LMTD STD: CPT | Mod: 26 | Performed by: INTERNAL MEDICINE

## 2024-12-10 PROCEDURE — C1894 INTRO/SHEATH, NON-LASER: HCPCS | Performed by: INTERNAL MEDICINE

## 2024-12-10 PROCEDURE — 210N000001 HC R&B IMCU HEART CARE

## 2024-12-10 PROCEDURE — 93010 ELECTROCARDIOGRAM REPORT: CPT | Mod: XU | Performed by: INTERNAL MEDICINE

## 2024-12-10 PROCEDURE — 93325 DOPPLER ECHO COLOR FLOW MAPG: CPT | Mod: 26 | Performed by: INTERNAL MEDICINE

## 2024-12-10 PROCEDURE — 33361 REPLACE AORTIC VALVE PERQ: CPT | Performed by: INTERNAL MEDICINE

## 2024-12-10 PROCEDURE — 85347 COAGULATION TIME ACTIVATED: CPT

## 2024-12-10 PROCEDURE — 99222 1ST HOSP IP/OBS MODERATE 55: CPT | Mod: 25 | Performed by: NURSE PRACTITIONER

## 2024-12-10 PROCEDURE — 93005 ELECTROCARDIOGRAM TRACING: CPT

## 2024-12-10 PROCEDURE — 93325 DOPPLER ECHO COLOR FLOW MAPG: CPT

## 2024-12-10 PROCEDURE — 999N000184 HC STATISTIC TELEMETRY

## 2024-12-10 PROCEDURE — 99152 MOD SED SAME PHYS/QHP 5/>YRS: CPT | Performed by: INTERNAL MEDICINE

## 2024-12-10 PROCEDURE — 02RF38Z REPLACEMENT OF AORTIC VALVE WITH ZOOPLASTIC TISSUE, PERCUTANEOUS APPROACH: ICD-10-PCS | Performed by: STUDENT IN AN ORGANIZED HEALTH CARE EDUCATION/TRAINING PROGRAM

## 2024-12-10 PROCEDURE — 272N000001 HC OR GENERAL SUPPLY STERILE: Performed by: INTERNAL MEDICINE

## 2024-12-10 PROCEDURE — 33361 REPLACE AORTIC VALVE PERQ: CPT | Mod: 62 | Performed by: STUDENT IN AN ORGANIZED HEALTH CARE EDUCATION/TRAINING PROGRAM

## 2024-12-10 PROCEDURE — 250N000013 HC RX MED GY IP 250 OP 250 PS 637: Performed by: NURSE PRACTITIONER

## 2024-12-10 PROCEDURE — C1760 CLOSURE DEV, VASC: HCPCS | Performed by: INTERNAL MEDICINE

## 2024-12-10 PROCEDURE — 36415 COLL VENOUS BLD VENIPUNCTURE: CPT | Performed by: INTERNAL MEDICINE

## 2024-12-10 PROCEDURE — 250N000011 HC RX IP 250 OP 636: Performed by: INTERNAL MEDICINE

## 2024-12-10 PROCEDURE — C1769 GUIDE WIRE: HCPCS | Performed by: INTERNAL MEDICINE

## 2024-12-10 PROCEDURE — 86900 BLOOD TYPING SEROLOGIC ABO: CPT | Performed by: INTERNAL MEDICINE

## 2024-12-10 PROCEDURE — 250N000009 HC RX 250: Performed by: INTERNAL MEDICINE

## 2024-12-10 PROCEDURE — 99153 MOD SED SAME PHYS/QHP EA: CPT | Performed by: INTERNAL MEDICINE

## 2024-12-10 PROCEDURE — 93308 TTE F-UP OR LMTD: CPT | Mod: 26 | Performed by: INTERNAL MEDICINE

## 2024-12-10 DEVICE — IMPLANTABLE DEVICE: Type: IMPLANTABLE DEVICE | Status: FUNCTIONAL

## 2024-12-10 RX ORDER — PROTAMINE SULFATE 10 MG/ML
INJECTION, SOLUTION INTRAVENOUS
Status: DISCONTINUED | OUTPATIENT
Start: 2024-12-10 | End: 2024-12-10 | Stop reason: HOSPADM

## 2024-12-10 RX ORDER — IOPAMIDOL 755 MG/ML
INJECTION, SOLUTION INTRAVASCULAR
Status: DISCONTINUED | OUTPATIENT
Start: 2024-12-10 | End: 2024-12-10 | Stop reason: HOSPADM

## 2024-12-10 RX ORDER — FINASTERIDE 1 MG/1
1 TABLET, FILM COATED ORAL DAILY
Status: DISCONTINUED | OUTPATIENT
Start: 2024-12-11 | End: 2024-12-11 | Stop reason: HOSPADM

## 2024-12-10 RX ORDER — PREDNISONE 10 MG/1
10 TABLET ORAL DAILY
Status: DISCONTINUED | OUTPATIENT
Start: 2024-12-11 | End: 2024-12-11 | Stop reason: HOSPADM

## 2024-12-10 RX ORDER — NICOTINE POLACRILEX 4 MG
15-30 LOZENGE BUCCAL
Status: DISCONTINUED | OUTPATIENT
Start: 2024-12-10 | End: 2024-12-11 | Stop reason: HOSPADM

## 2024-12-10 RX ORDER — ATORVASTATIN CALCIUM 20 MG/1
20 TABLET, FILM COATED ORAL EVERY EVENING
Status: DISCONTINUED | OUTPATIENT
Start: 2024-12-10 | End: 2024-12-11 | Stop reason: HOSPADM

## 2024-12-10 RX ORDER — NITROGLYCERIN 0.4 MG/1
0.4 TABLET SUBLINGUAL EVERY 5 MIN PRN
Status: DISCONTINUED | OUTPATIENT
Start: 2024-12-10 | End: 2024-12-11 | Stop reason: HOSPADM

## 2024-12-10 RX ORDER — CETIRIZINE HYDROCHLORIDE 10 MG/1
10 TABLET ORAL EVERY EVENING
Status: DISCONTINUED | OUTPATIENT
Start: 2024-12-11 | End: 2024-12-11 | Stop reason: HOSPADM

## 2024-12-10 RX ORDER — HEPARIN SODIUM 1000 [USP'U]/ML
INJECTION, SOLUTION INTRAVENOUS; SUBCUTANEOUS
Status: DISCONTINUED | OUTPATIENT
Start: 2024-12-10 | End: 2024-12-10 | Stop reason: HOSPADM

## 2024-12-10 RX ORDER — ASPIRIN 325 MG
325 TABLET ORAL ONCE
Status: COMPLETED | OUTPATIENT
Start: 2024-12-10 | End: 2024-12-10

## 2024-12-10 RX ORDER — FENTANYL CITRATE 50 UG/ML
INJECTION, SOLUTION INTRAMUSCULAR; INTRAVENOUS
Status: DISCONTINUED | OUTPATIENT
Start: 2024-12-10 | End: 2024-12-10 | Stop reason: HOSPADM

## 2024-12-10 RX ORDER — BUMETANIDE 2 MG/1
2 TABLET ORAL
Status: DISCONTINUED | OUTPATIENT
Start: 2024-12-11 | End: 2024-12-11 | Stop reason: HOSPADM

## 2024-12-10 RX ORDER — SODIUM CHLORIDE 9 MG/ML
INJECTION, SOLUTION INTRAVENOUS CONTINUOUS
Status: DISCONTINUED | OUTPATIENT
Start: 2024-12-10 | End: 2024-12-10 | Stop reason: HOSPADM

## 2024-12-10 RX ORDER — ASPIRIN 81 MG/1
81 TABLET ORAL DAILY
Status: DISCONTINUED | OUTPATIENT
Start: 2024-12-11 | End: 2024-12-11 | Stop reason: HOSPADM

## 2024-12-10 RX ORDER — DEXTROSE MONOHYDRATE 25 G/50ML
25-50 INJECTION, SOLUTION INTRAVENOUS
Status: DISCONTINUED | OUTPATIENT
Start: 2024-12-10 | End: 2024-12-11 | Stop reason: HOSPADM

## 2024-12-10 RX ORDER — ALLOPURINOL 300 MG/1
300 TABLET ORAL DAILY
Status: DISCONTINUED | OUTPATIENT
Start: 2024-12-11 | End: 2024-12-11 | Stop reason: HOSPADM

## 2024-12-10 RX ORDER — PREDNISONE 5 MG/1
10 TABLET ORAL DAILY
COMMUNITY

## 2024-12-10 RX ORDER — CEFAZOLIN SODIUM/WATER 3 G/30 ML
3 SYRINGE (ML) INTRAVENOUS
Status: COMPLETED | OUTPATIENT
Start: 2024-12-10 | End: 2024-12-10

## 2024-12-10 RX ORDER — LIDOCAINE 40 MG/G
CREAM TOPICAL
Status: DISCONTINUED | OUTPATIENT
Start: 2024-12-10 | End: 2024-12-10 | Stop reason: HOSPADM

## 2024-12-10 RX ORDER — HYDRALAZINE HYDROCHLORIDE 20 MG/ML
10 INJECTION INTRAMUSCULAR; INTRAVENOUS
Status: DISCONTINUED | OUTPATIENT
Start: 2024-12-10 | End: 2024-12-11 | Stop reason: HOSPADM

## 2024-12-10 RX ORDER — ACETAMINOPHEN 325 MG/1
650 TABLET ORAL EVERY 4 HOURS PRN
Status: DISCONTINUED | OUTPATIENT
Start: 2024-12-10 | End: 2024-12-11 | Stop reason: HOSPADM

## 2024-12-10 RX ADMIN — ATORVASTATIN CALCIUM 20 MG: 20 TABLET, FILM COATED ORAL at 20:32

## 2024-12-10 RX ADMIN — CEFAZOLIN 3 G: 10 INJECTION, POWDER, FOR SOLUTION INTRAVENOUS at 09:14

## 2024-12-10 ASSESSMENT — ACTIVITIES OF DAILY LIVING (ADL)
ADLS_ACUITY_SCORE: 35
ADLS_ACUITY_SCORE: 35
ADLS_ACUITY_SCORE: 58
ADLS_ACUITY_SCORE: 61
ADLS_ACUITY_SCORE: 58
ADLS_ACUITY_SCORE: 35
ADLS_ACUITY_SCORE: 35
ADLS_ACUITY_SCORE: 58
ADLS_ACUITY_SCORE: 35
ADLS_ACUITY_SCORE: 58
ADLS_ACUITY_SCORE: 35
ADLS_ACUITY_SCORE: 58
ADLS_ACUITY_SCORE: 58

## 2024-12-10 NOTE — OP NOTE
Cardiac Surgery - Operative Report    DATE OF SERVICE: 12/10/2024     PREOPERATIVE DIAGNOSES: Severe aortic stenosis.     POSTOPERATIVE DIAGNOSES: Severe aortic stenosis.     PROCEDURE PERFORMED: Transfemoral transcatheter aortic valve replacement      ATTENDING CARDIOLOGISTS: Bryn Muse and Clifton    SURGEON: Michael Mulvihill, MD     ANESTHESIA:  Managed anesthesia care.     ESTIMATED BLOOD LOSS:  Minimal.     SPECIMENS:  None    Implant Name Type Inv. Item Serial No.  Lot No. LRB No. Used Action   VALVE AOR 23-26MM EVOLUT FX+ 29MM ANNULUS [id number]-29 - [id number] Valve VALVE AOR 23-26MM EVOLUT FX+ 29MM ANNULUS [id number] [id number] [employer] [id number]  1 Wasted   VALVE AOR 26-30MM EVOLUT FX+ 34MM ANNULUS - [id number] Valve VALVE AOR 26-30MM EVOLUT FX+ 34MM ANNULUS [id number] [employer] [id number]  1 Implanted        INDICATIONS FOR PROCEDURE: 74M with severe aortic stenosis.  They were evaluated by the multidisciplinary team. Transcatheter replacement was recommended. The patient understands the risks and benefits of the procedure and wishes to undergo the operation.     OPERATIVE FINDINGS:  There was no significant perivalvular leak after the procedure. Valve mean gradient was normal.    APPROACH: Right and left common femoral arteries, left common femoral vein    SHEATH:  22  Fr  Sentrant Sheath in the RCFA, 7Fr long sheath in the LCFA, 6 Fr long sheath in the LCFV    CATHETERS: 6Fr angled pigtail, 6Fr AL-1     OPERATIVE DESCRIPTION IN DETAIL:  After obtaining informed consent, the patient was brought to the operating room and placed in the supine position on the operating room table.  Appropriate lines and devices for monitoring were placed by the anesthesia team.  The was sedated and prepped and draped before a timeout was performed to confirm the correct patient identity, as well as the procedure to be performed.      Access was obtained in the right and left common femoral arteries by the  interventional cardiology team.     First, a left coronary injection was performed on account of reduced LVEF and wall motion abnormalities. There was a distal LAD lesion identified. We elected to proceed with TAVR with plans for consideration of further percutaneous intervention at a later date.    A Lunderquist wire was used to support the sheath insertion. Heparin was administered for goal ACT ~300    The 6Fr pigtail catheter was positioned in the right-coronary cusp and angiography was used to confirm the optimal implant angle. The pigtail was then moved to the non-coronary cusp.     Access into the LV was obtained using an AL-1 catheter and a straight wire. The AL-1 catheter was used to exchange the straight wire for a J-wire and a pigtail catheter was then positioned in the left ventricle. The pigtail catheter was used to advance a wire into in the LV and the pigtail was removed.     First, balloon valvuloplasty was performed. Then under rapid pacing, we found that the planned 29mm valve did not appropriately seat at the annulus. We elected to proceed with 34mm valve. Under rapid pacing, the valve was successfully deployed.     Subsequent transthoracic echocardiography showed trace catheter-associated paravalvular insufficiency.     Protamine was administered. The valve sheath access arteriotomy was successfully closed with a single suture closure device, and manual pressure was applied for successful hemostasis.     A final iliofemoral angiogram showed no evidence of extravasation or stenosis.     I was personally present for all key and critical portions of the procedure.    Michael Mulvihill, MD  12/10/2024 @ 11:33 AM

## 2024-12-10 NOTE — PROGRESS NOTES
Care Suites Post Procedure Note    Patient Information  Name: Mike Schultz  Age: 74 year old    Post Procedure  Time patient returned to Care Suites: 1115  Concerns/abnormal assessment: possible new BBB and ST elevation on telemetry, EKG ordered, called to have done stat  If abnormal assessment, provider notified: Yes, EKG showing new 1st degree AV block and bundle branch block, message to Gloria VENCES NP - per Gloria continue to monitor.  Plan/Other: Recovery from sedation, close monitoring of VS and bilateral groin sites, neuro checks as ordered, bedrest x 4 hours, transfer to heart center when bed available.    Malika Maher RN

## 2024-12-10 NOTE — PROGRESS NOTES
Bedrest complete, up and ambulated with SBA without difficulty, bilateral groin sites stable with no signs of bleeding or hematoma. Tolerating PO and voiding.   Transferred to heart center via wheelchair with all belongings present, accompanied by spouse. Report given to Sukhjinder ANDERSON RN.

## 2024-12-10 NOTE — PROVIDER NOTIFICATION
Chart review prior to admission. INR 1.89 on 12/5/24. Notified Gloria VENCES NP as ordered. Per Gloria no need for recheck, ok to proceed.

## 2024-12-10 NOTE — Clinical Note
Hemodynamic equipment used: 5 lead ECG, SynapCellK With 3 Leads, Machine BP Cuff and pulse oximeter probe.

## 2024-12-10 NOTE — H&P
[facility]      Cardiology H&P  Mike Schultz MRN: [id number]  1950  Date of Admission:12/10/2024  Primary care provider: Ranjan Courtney      Assessment and Plan:     Mike Schultz is a pleasant 74 year old admitted on 12/10/2024 for elective TAVR.     # Severe aortic stenosis s/p TAVR with 34 mm Medtronic FX valve   Sheath sites soft without hematoma. No arrhythmias. Neuro's intact  - Bedrest per protocol x 4 hours  - Neuro checks, per protocol  - Echocardiogram tomorrow  - Monitor groin sites  - EKG in morning   - Cardiac rehab/PT/OT  - Continuous telemetry monitoring  - Lifelong antibiotic prophylaxis prior to all dental procedures.      # Chronic diastolic heart failure, NYHA class II  NTpBNP 407. EDP 15 mmHg. Euvolemic on exam.   - Resume PTA bumex 2mg/2mg   - Daily weights  - Strict intake/output    # Atrial fibrillation   - Resume PTA Xarelto tomorrow if no bleeding concerns    # Coronary artery disease  Pre-TAVR coronary angiogram showed significant D1 disease that is being medically managed. He has no angina.   - Continue PTA atorvastatin 20 mg daily and losartan 12.5 mg daily     # Recent CVA while on Xarelto   Felt to be embolic despite being on Xarelto. Follows with neurology, contemplated adding ASA or switching to coumadin but no formal recommendation at this time. No residual deficits.   - Resume Xarelto and statin therapy, as above     # Type II diabetes   Newly diagnosed with A1C of 6.5, on zepbound.    - sliding scale insulin while inpatient  - POC BG checks   - Hypoglycemia protocol       FEN: Cardiac diet  Disposition: Home in 1-2 days with cardiac rehab pending stable post-op period  Code Status: FULL CODE    Gloria Bailon, APRN, CNP  [facility] Structural/Interventional Cardiology Team  Pager: [phone]    Clinically Significant Risk Factors Present on Admission               # Drug Induced Coagulation Defect: home medication list includes an anticoagulant  "medication  # Drug Induced Platelet Defect: home medication list includes an antiplatelet medication   # Hypertension: Noted on problem list  # Chronic heart failure with preserved ejection fraction: heart failure noted on problem list and last echo with EF >50%         # DMII: A1C = N/A within past 6 months    # Severe Obesity: Estimated body mass index is 42.88 kg/m  as calculated from the following:    Height as of this encounter: 1.727 m (5' 8\").    Weight as of this encounter: 127.9 kg (282 lb).       # Financial/Environmental Concerns:                 Chief Complaint:   Planned TAVR         History of Present Illness:   Mike Schultz is a 74-year-old man with a history of CKD, DM2, obesity, chronic Afib with recent stroke while on Xarelto, HFpEF, and severe aortic stenosis who presents today for TAVR.     Patient is now s/p 34 mm Medtronic FX valve via RCFA. Procedure went well without complication. No conduction issues or hemodynamic instability. Sheath sites remain soft, neuro's intact.             Review of Systems:    10 point review of systems negative except for stated above in HPI.          Past Medical History:   Medical History reviewed.   Past Medical History:   Diagnosis Date    (HFpEF) heart failure with preserved ejection fraction (H)     Acute gouty arthritis     Alcohol use disorder in remission     Amiodarone pulmonary toxicity     Aortic stenosis     Basal cell carcinoma     Of the nose    Bell's palsy     Herpes simplex without mention of complication     Hyperlipidemia     Hypertension 2000    Persistent atrial fibrillation (H)     persistent, DCCV 12/2017, ablation 11/6/17    Pulmonary nodules     Sleep apnea     CPAP             Past Surgical History:   Surgical History reviewed.   Past Surgical History:   Procedure Laterality Date    ABLATION OF DYSRHYTHMIC FOCUS  2018, 04/12/2019    Procedure: EP Ablation Focal AFIB;  Surgeon: Fady Day MD;  Location: [facility] CARDIAC CATH LAB    " ACHILLES TENDON SURGERY  1997    ANESTHESIA CARDIOVERSION N/A 2/26/2019    Procedure: ANESTHESIA CARDIOVERSION (CONNER);  Surgeon: GENERIC ANESTHESIA PROVIDER;  Location:  OR    ANESTHESIA CARDIOVERSION N/A 11/19/2020    Procedure: ANESTHESIA, FOR CARDIOVERSION (dr campbell);  Surgeon: GENERIC ANESTHESIA PROVIDER;  Location:  OR    ANESTHESIA CARDIOVERSION N/A 8/22/2022    Procedure: CARDIOVERSION;  Surgeon: GENERIC ANESTHESIA PROVIDER;  Location:  OR    ANESTHESIA CARDIOVERSION N/A 9/13/2023    Procedure: Anesthesia cardioversion;  Surgeon: GENERIC ANESTHESIA PROVIDER;  Location:  OR    ANESTHESIA CARDIOVERSION N/A 10/6/2023    Procedure: Anesthesia cardioversion;  Surgeon: GENERIC ANESTHESIA PROVIDER;  Location:  OR    ANESTHESIA CARDIOVERSION N/A 8/1/2024    Procedure: Anesthesia cardioversion;  Surgeon: GENERIC ANESTHESIA PROVIDER;  Location:  OR    BASAL CELL CARCINOMA EXCISION Right 2009    nose, took cartilage from inner ear.    CARDIOVERSION  2012, 2019    cardioversion for atr fib    COLONOSCOPY  2008    COLONOSCOPY N/A 3/22/2023    Procedure: COLONOSCOPY, FLEXIBLE, WITH LESION REMOVAL USING SNARE;  Surgeon: Anne Lucero MD;  Location:  GI    CV CORONARY ANGIOGRAM N/A 11/22/2024    Procedure: Coronary Angiogram;  Surgeon: Murphy Mccann MD;  Location:  HEART CARDIAC CATH LAB    CV RIGHT HEART CATH MEASUREMENTS RECORDED N/A 7/31/2024    Procedure: Right Heart Cath;  Surgeon: Adriano Lazo MD;  Location:  HEART CARDIAC CATH LAB    CV RIGHT HEART CATH MEASUREMENTS RECORDED N/A 11/22/2024    Procedure: Right Heart Catheterization;  Surgeon: Murphy Mccann MD;  Location:  HEART CARDIAC CATH LAB    EP ABLATION FOCAL AFIB N/A 4/12/2019    Procedure: EP Ablation Focal AFIB;  Surgeon: Fady Day MD;  Location:  HEART CARDIAC CATH LAB    EP ABLATION FOCAL AFIB N/A 5/2/2024    Procedure: Ablation Atrial Fibrilation;  Surgeon: Fady Day MD;  Location:  HEART  CARDIAC CATH LAB    HERNIA REPAIR, UMBILICAL  2012    Procedure: HERNIORRHAPHY UMBILICAL;  UMBILICAL HERNIA REPAIR;  Surgeon: Ra Crocker MD;  Location: [facility]    HERNIORRHAPHY UMBILICAL  2012    Procedure: HERNIORRHAPHY UMBILICAL;  UMBILICAL HERNIA REPAIR;  Surgeon: Ra Crocker MD;  Location: [facility]    ORTHOPEDIC SURGERY      ORTHOPEDIC SURGERY      OTHER SURGICAL HISTORY Right 2017    total KNEE ARTHROSCOPY     TOTAL KNEE ARTHROPLASTY Left 2018    Z NONSPECIFIC PROCEDURE  1997    achilles tendon    Z NONSPECIFIC PROCEDURE      knees, arthroscopy    ZZ NONSPECIFIC PROCEDURE      basal cell skin ca, nose    ZZC NONSPECIFIC PROCEDURE      flex sig; colonoscopy due 3/2008    ZZ NONSPECIFIC PROCEDURE      cardioversion for atr fib    ZZC TOTAL KNEE ARTHROPLASTY Left 2018    Dr. Malick Suarez             Social History:   Social History reviewed.  Social History     Tobacco Use    Smoking status: Former     Current packs/day: 0.00     Average packs/day: 1 pack/day for 34.6 years (34.6 ttl pk-yrs)     Types: Cigarettes     Start date:      Quit date: 2011     Years since quittin.3     Passive exposure: Past    Smokeless tobacco: Never    Tobacco comments:     quit    Substance Use Topics    Alcohol use: Not Currently             Family History:   Family History reviewed.   Family History   Problem Relation Age of Onset    Family History Negative Mother     Heart Disease Father         decesased at 42 - MI    Heart Disease Sister          MI    Lipids Sister     Lipids Sister     No Known Problems Sister     Heart Disease Brother          MI    Heart Disease Brother         CABG AT 49    Lung Cancer No family hx of              Allergies:     Allergies   Allergen Reactions    Amiodarone Shortness Of Breath     Suspected amiodarone toxicity involving lungs             Medications:   Medications Reviewed.   Current Facility-Administered  "Medications   Medication Dose Route Frequency Provider Last Rate Last Admin    fentaNYL (PF) (SUBLIMAZE) injection    Once PRN Ricky Muse MD   25 mcg at 12/10/24 1027    heparin (porcine) injection    Once PRN Ricky Muse MD   5,000 Units at 12/10/24 1017    HOLD: rivaroxaban (XARELTO) 24 hours pre-procedure   Does not apply HOLD Ricky Muse MD        iopamidol (ISOVUE-370) solution    Once PRN Ricky Muse MD   295 mL at 12/10/24 1054    lidocaine (LMX4) cream   Topical Q1H PRN Ricky Muse MD        lidocaine 1 % 0.1-1 mL  0.1-1 mL Other Q1H PRN Ricky Muse MD        lidocaine 1 %    Once PRN Ricky Muse MD   10 mL at 12/10/24 0951    midazolam (VERSED) injection    Once PRN Ricky Muse MD   1 mg at 12/10/24 1043    protamine injection    Once PRN Ricky Muse MD   150 mg at 12/10/24 1049    sodium chloride (PF) 0.9% PF flush 3 mL  3 mL Intracatheter Q8H Ricky Muse MD        sodium chloride (PF) 0.9% PF flush 3 mL  3 mL Intracatheter Q1H PRN Ricky Muse MD        sodium chloride (PF) 0.9% PF flush 3 mL  3 mL Intracatheter q1 min prn Ricky Muse MD        sodium chloride 0.9 % infusion   Intravenous Continuous Ricky Muse MD                 Physical Exam:   Vitals were reviewed.  Blood pressure (!) 142/89, pulse 64, temperature 97.7  F (36.5  C), temperature source Oral, resp. rate 18, height 1.727 m (5' 8\"), weight 127.9 kg (282 lb), SpO2 96%.    General: AAOx3, NAD  Skin: Not jaundiced, no rash, no ecchymoses; incision site CDI, soft, non-tender, no signs of hematoma. No bruit  HEENT: MMM, PERRLA, EOM intact  CV: irregularly irregular rhythm, normal S1S2, grade I ANDREZ   Resp: Clear to auscultation bilaterally, no wheezes, rhonchi  Abd: Soft, non-tender, BS+, no masses appreciated  Extremities: warm and well perfused, palpable pulses, no edema  Neuro: No lateralizing symptoms " "or focal neurologic deficits        Labs:   Routine Labs:  No results found for: \"TROPI\", \"TROPONIN\", \"TROPR\", \"TROPN\"  CMP  Recent Labs   Lab 12/10/24  0748 12/05/24  1107   NA  --  139   POTASSIUM  --  4.5   CHLORIDE  --  103   CO2  --  27   ANIONGAP  --  9   * 115*   BUN  --  12.0   CR  --  0.89   GFRESTIMATED  --  90   ASHELY  --  9.2   PROTTOTAL  --  6.9   ALBUMIN  --  3.8   BILITOTAL  --  0.4   ALKPHOS  --  192*   AST  --  21   ALT  --  14     CBC  Recent Labs   Lab 12/05/24  1107   WBC 7.8   RBC 4.78   HGB 14.9   HCT 45.9   MCV 96   MCH 31.2   MCHC 32.5   RDW 14.5        INR  Recent Labs   Lab 12/05/24  1107   INR 1.89*       Medical Decision Making       40 MINUTES SPENT BY ME on the date of service doing chart review, history, exam, documentation & further activities per the note.         "

## 2024-12-10 NOTE — PROGRESS NOTES
Care Suites Admission Nursing Note    Patient Information  Name: Mike Schultz  Age: 74 year old  Reason for admission: TAVR    Care Suites arrival time: 0730    Visitor Information  Name: Cesario Alaniz     Patient Admission/Assessment   Pre-procedure assessment complete: Yes  If abnormal assessment/labs, provider notified: N/A, some residual neuro check abnormalities from recent CVA  NPO: Yes  Medications held per instructions/orders: Yes  Consent: deferred  If applicable, pregnancy test status: deferred  Patient oriented to room: Yes  Education/questions answered: Yes  Plan/other: awaiting TAVR team to meet with patient this AM, proceed as planned    Discharge Planning  Discharge name/phone number: cesario Alaniz [phone]  Overnight post sedation caregiver: plan for patient to be admitted overnight  Discharge location: home tomorrow if doing well    Malika Maher RN

## 2024-12-11 ENCOUNTER — APPOINTMENT (OUTPATIENT)
Dept: CARDIOLOGY | Facility: CLINIC | Age: 74
DRG: 267 | End: 2024-12-11
Attending: NURSE PRACTITIONER
Payer: COMMERCIAL

## 2024-12-11 ENCOUNTER — APPOINTMENT (OUTPATIENT)
Dept: PHYSICAL THERAPY | Facility: CLINIC | Age: 74
DRG: 267 | End: 2024-12-11
Attending: NURSE PRACTITIONER
Payer: COMMERCIAL

## 2024-12-11 VITALS
RESPIRATION RATE: 17 BRPM | TEMPERATURE: 98.1 F | BODY MASS INDEX: 43.33 KG/M2 | WEIGHT: 285.9 LBS | DIASTOLIC BLOOD PRESSURE: 82 MMHG | HEIGHT: 68 IN | HEART RATE: 84 BPM | SYSTOLIC BLOOD PRESSURE: 155 MMHG | OXYGEN SATURATION: 94 %

## 2024-12-11 LAB
ANION GAP SERPL CALCULATED.3IONS-SCNC: 9 MMOL/L (ref 7–15)
BUN SERPL-MCNC: 15.6 MG/DL (ref 8–23)
CALCIUM SERPL-MCNC: 8.5 MG/DL (ref 8.8–10.4)
CHLORIDE SERPL-SCNC: 100 MMOL/L (ref 98–107)
CREAT SERPL-MCNC: 0.91 MG/DL (ref 0.67–1.17)
EGFRCR SERPLBLD CKD-EPI 2021: 89 ML/MIN/1.73M2
ERYTHROCYTE [DISTWIDTH] IN BLOOD BY AUTOMATED COUNT: 14.4 % (ref 10–15)
GLUCOSE BLDC GLUCOMTR-MCNC: 119 MG/DL (ref 70–99)
GLUCOSE BLDC GLUCOMTR-MCNC: 126 MG/DL (ref 70–99)
GLUCOSE SERPL-MCNC: 117 MG/DL (ref 70–99)
HCO3 SERPL-SCNC: 27 MMOL/L (ref 22–29)
HCT VFR BLD AUTO: 39.1 % (ref 40–53)
HGB BLD-MCNC: 13.2 G/DL (ref 13.3–17.7)
LVEF ECHO: NORMAL
MAGNESIUM SERPL-MCNC: 1.8 MG/DL (ref 1.7–2.3)
MCH RBC QN AUTO: 31.7 PG (ref 26.5–33)
MCHC RBC AUTO-ENTMCNC: 33.8 G/DL (ref 31.5–36.5)
MCV RBC AUTO: 94 FL (ref 78–100)
PHOSPHATE SERPL-MCNC: 3.5 MG/DL (ref 2.5–4.5)
PLATELET # BLD AUTO: 187 10E3/UL (ref 150–450)
POTASSIUM SERPL-SCNC: 3.6 MMOL/L (ref 3.4–5.3)
RBC # BLD AUTO: 4.16 10E6/UL (ref 4.4–5.9)
SODIUM SERPL-SCNC: 136 MMOL/L (ref 135–145)
WBC # BLD AUTO: 11.5 10E3/UL (ref 4–11)

## 2024-12-11 PROCEDURE — 93325 DOPPLER ECHO COLOR FLOW MAPG: CPT

## 2024-12-11 PROCEDURE — 250N000011 HC RX IP 250 OP 636: Performed by: NURSE PRACTITIONER

## 2024-12-11 PROCEDURE — 93308 TTE F-UP OR LMTD: CPT | Mod: 26 | Performed by: INTERNAL MEDICINE

## 2024-12-11 PROCEDURE — 84100 ASSAY OF PHOSPHORUS: CPT | Performed by: NURSE PRACTITIONER

## 2024-12-11 PROCEDURE — 85048 AUTOMATED LEUKOCYTE COUNT: CPT | Performed by: NURSE PRACTITIONER

## 2024-12-11 PROCEDURE — 250N000012 HC RX MED GY IP 250 OP 636 PS 637: Performed by: NURSE PRACTITIONER

## 2024-12-11 PROCEDURE — 80048 BASIC METABOLIC PNL TOTAL CA: CPT | Performed by: NURSE PRACTITIONER

## 2024-12-11 PROCEDURE — 99239 HOSP IP/OBS DSCHRG MGMT >30: CPT | Performed by: INTERNAL MEDICINE

## 2024-12-11 PROCEDURE — 250N000013 HC RX MED GY IP 250 OP 250 PS 637: Performed by: NURSE PRACTITIONER

## 2024-12-11 PROCEDURE — 97530 THERAPEUTIC ACTIVITIES: CPT | Mod: GP

## 2024-12-11 PROCEDURE — 82565 ASSAY OF CREATININE: CPT | Performed by: NURSE PRACTITIONER

## 2024-12-11 PROCEDURE — 93005 ELECTROCARDIOGRAM TRACING: CPT

## 2024-12-11 PROCEDURE — 83735 ASSAY OF MAGNESIUM: CPT | Performed by: NURSE PRACTITIONER

## 2024-12-11 PROCEDURE — 97161 PT EVAL LOW COMPLEX 20 MIN: CPT | Mod: GP

## 2024-12-11 PROCEDURE — 93325 DOPPLER ECHO COLOR FLOW MAPG: CPT | Mod: 26 | Performed by: INTERNAL MEDICINE

## 2024-12-11 PROCEDURE — 36415 COLL VENOUS BLD VENIPUNCTURE: CPT | Performed by: NURSE PRACTITIONER

## 2024-12-11 PROCEDURE — 255N000002 HC RX 255 OP 636: Performed by: NURSE PRACTITIONER

## 2024-12-11 PROCEDURE — 85027 COMPLETE CBC AUTOMATED: CPT | Performed by: NURSE PRACTITIONER

## 2024-12-11 PROCEDURE — 97110 THERAPEUTIC EXERCISES: CPT | Mod: GP

## 2024-12-11 PROCEDURE — 85018 HEMOGLOBIN: CPT | Performed by: NURSE PRACTITIONER

## 2024-12-11 PROCEDURE — 93010 ELECTROCARDIOGRAM REPORT: CPT | Mod: XU | Performed by: INTERNAL MEDICINE

## 2024-12-11 PROCEDURE — 999N000096 CARDIAC MOBILE TELEMETRY MONITOR

## 2024-12-11 PROCEDURE — 93321 DOPPLER ECHO F-UP/LMTD STD: CPT | Mod: 26 | Performed by: INTERNAL MEDICINE

## 2024-12-11 RX ORDER — LOSARTAN POTASSIUM 25 MG/1
25 TABLET ORAL DAILY
Status: DISCONTINUED | OUTPATIENT
Start: 2024-12-11 | End: 2024-12-11 | Stop reason: HOSPADM

## 2024-12-11 RX ORDER — LOSARTAN POTASSIUM 25 MG/1
25 TABLET ORAL DAILY
Qty: 45 TABLET | Refills: 0 | Status: SHIPPED | OUTPATIENT
Start: 2024-12-12

## 2024-12-11 RX ORDER — MAGNESIUM OXIDE 400 MG/1
400 TABLET ORAL EVERY 4 HOURS
Status: DISCONTINUED | OUTPATIENT
Start: 2024-12-11 | End: 2024-12-11 | Stop reason: HOSPADM

## 2024-12-11 RX ADMIN — HYDRALAZINE HYDROCHLORIDE 10 MG: 20 INJECTION INTRAMUSCULAR; INTRAVENOUS at 00:37

## 2024-12-11 RX ADMIN — LOSARTAN POTASSIUM 25 MG: 25 TABLET, FILM COATED ORAL at 09:44

## 2024-12-11 RX ADMIN — Medication 400 MG: at 09:44

## 2024-12-11 RX ADMIN — PREDNISONE 10 MG: 10 TABLET ORAL at 09:43

## 2024-12-11 RX ADMIN — PERFLUTREN 10 ML: 6.52 INJECTION, SUSPENSION INTRAVENOUS at 08:11

## 2024-12-11 RX ADMIN — ASPIRIN 81 MG: 81 TABLET, COATED ORAL at 09:44

## 2024-12-11 RX ADMIN — ALLOPURINOL 300 MG: 300 TABLET ORAL at 09:44

## 2024-12-11 ASSESSMENT — ACTIVITIES OF DAILY LIVING (ADL)
ADLS_ACUITY_SCORE: 35

## 2024-12-11 NOTE — PLAN OF CARE
A&OX4, neuros intact except baseline left facial droop, R and L fixed pupil and slurred speech. 2 L NC for DELEON, hypertensive overnight  /97, PRN hydralazine given with good outcome. Tele- SR with 1st AVB, BBB.  and 126. Up with SBA, on regular diet. Denies pain, Right and Left groin WNL, pulses present with doppler.

## 2024-12-11 NOTE — PLAN OF CARE
VSS, not in pain at time of discharge. Pt states all belongings and paperwork are accounted for. Medications have been filled at in house pharmacy and are with pt at time of discharge. Discharge education complete including meds, follow up and all instructions. Post TAVR instructions gone over. MCOT education to be done with EKG staff. No further questions on discharge information. Family will be driving pt home.

## 2024-12-11 NOTE — PROGRESS NOTES
12/11/24 0827   Appointment Info   Signing Clinician's Name / Credentials (PT) Snehal Long, PT, DPT   Rehab Comments (PT) Cardiac Rehab   Living Environment   People in Home significant other   Current Living Arrangements house  ([facility])   Home Accessibility stairs to enter home;stairs within home   Number of Stairs, Main Entrance 2   Stair Railings, Main Entrance none   Number of Stairs, Within Home, Primary greater than 10 stairs   Stair Railings, Within Home, Primary railings safe and in good condition   Transportation Anticipated family or friend will provide   Living Environment Comments pt has stairs to bedroom/bathroom, significant other able to help as needed.   Self-Care   Usual Activity Tolerance good   Current Activity Tolerance moderate   Equipment Currently Used at Home none   Fall history within last six months no   Activity/Exercise/Self-Care Comment pt is ind with no AD, ind with ADLs and IADLs at baseline.   General Information   Onset of Illness/Injury or Date of Surgery 12/10/24   Referring Physician Gloria Bailon, CNP   Patient/Family Therapy Goals Statement (PT) Planning on going home when able.   Pertinent History of Current Problem (include personal factors and/or comorbidities that impact the POC) Pt is a 74 year old admitted on 12/10/2024 for elective TAVR on 12/10. Pt is POD#1. PMHx significant for Afib, DMII, CVA, CAD, Chronic diastolic heart failure.   Existing Precautions/Restrictions cardiac;lifting   Heart Disease Risk Factors Overweight;Medical history;Gender;Age;Diabetes;Lack of physical activity   Cognition   Affect/Mental Status (Cognition) WFL   Orientation Status (Cognition) oriented x 4   Follows Commands (Cognition) WFL   Pain Assessment   Patient Currently in Pain No   Integumentary/Edema   Integumentary/Edema Comments groin sites not visualized, pt reports slight soreness on R groin.   Posture    Posture Forward head position;Protracted shoulders   Range of Motion  (ROM)   ROM Comment Grossly WFL BUE and LE with functional transfers.   Strength (Manual Muscle Testing)   Strength Comments grossly antigravity strength BUE and LE with functional transfers.   Bed Mobility   Comment, (Bed Mobility) supine HOB approx 45 deg>sitting EOB, ind   Transfers   Comment, (Transfers) sit>stand no AD, ind.   Gait/Stairs (Locomotion)   Distance in Feet (Gait) 5' eval + 400' treatment   Comment, (Gait/Stairs) amb with no AD, ind, wide LOBO, increased lateral trunk sway, no LOB.   Balance   Balance Comments Able to maintain seated balance at EOB. Able to maintain standing balance, ind.   Sensory Examination   Sensory Perception patient reports no sensory changes   Clinical Impression   Criteria for Skilled Therapeutic Intervention Yes, treatment indicated   PT Diagnosis (PT) Impaired functional mobility   Influenced by the following impairments post op status, lifting precautions, decreased activity tolerance   Functional limitations due to impairments Impaired functional independence   Clinical Presentation (PT Evaluation Complexity) stable   Clinical Presentation Rationale per MR and clinical judgement   Clinical Decision Making (Complexity) low complexity   Planned Therapy Interventions (PT) balance training;bed mobility training;gait training;home exercise program;neuromuscular re-education;patient/family education;ROM (range of motion);stair training;strengthening;stretching;transfer training;progressive activity/exercise   Risk & Benefits of therapy have been explained evaluation/treatment results reviewed;care plan/treatment goals reviewed;risks/benefits reviewed;current/potential barriers reviewed;participants voiced agreement with care plan;participants included;patient   PT Total Evaluation Time   PT Eval, Low Complexity Minutes (49432) 5   Physical Therapy Goals   PT Frequency One time eval and treatment only   PT Predicted Duration/Target Date for Goal Attainment 12/11/24   PT Goals  Cardiac Phase 1   PT: Understanding of cardiac education to maximize quality of life, condition management, and health outcomes Patient;Verbalize;Demonstrate;Goal Met   PT: Perform aerobic activity with stable cardiovascular response continuous;5 minutes;treadmill;Goal Met   PT: Functional/aerobic ambulation tolerance with stable cardiovascular response in order to return to home and community environment Independent;Greater than 300 feet;Goal Met   PT: Navigation of stairs simulating home set up with stable cardiovascular response in order to return to home and community environment Modified independent;6 stairs;Rail on both sides;Goal Met   Interventions   Interventions Quick Adds Therapeutic Activity;Therapeutic Procedure   Therapeutic Procedure/Exercise   Ther. Procedure: strength, endurance, ROM, flexibillity Minutes (52005) 17   Symptoms Noted During/After Treatment fatigue   Treatment Time Includes (CR Only) See specific exercise details intervention group(s);Monitoring of vital signs (see vital signs flowsheet for details)   Therapeutic Activity   Therapeutic Activities: dynamic activities to improve functional performance Minutes (01858) 12   Treatment Detail/Skilled Intervention pt agreed to therapy session. pt sititng up in bed upon start of session. Pt completed additional 4 STS throughout session, ind. pt able ot don underwear, ind. pt able to maintain standing balance while voiding, ind. Edu pt and provided pt with handouts, see cardiac edu section below for details. pt verbalized understanding. All questions answered. Pt tolerated well. RN updated. pt left in room, all needs in reach.   Ambulation   Duration (minutes)   (3 min warm up, 1.5 min cool down)   Symptoms Dyspnea;Fatigue   Cardiovascular Response Hypertension at rest;Hypertensive response to exercise   Exercise Details warm up amb in hallway, approx 225', fair pace, ind. seated rest break before treadmill, SPO2 ranged 90-94%, increased work  of breathing, quickly recovered with seated rest break. cool down approx 225' fair pace, ind. Pt tolerated well.   Treadmill   Duration (minutes) 5 minutes   Effort Scale 4   Symptoms Fatigue;Dyspnea   Exercise Details pt amb at 1.0 speed, PSO2 ranged 90-92% throughout, max    Stairs   Exercise Details pt completed 3 sets of 2-3 stairs, reciprocal pattern, bilateral rail use, mod ind.   Cardiac Education   Education Provided Precautions;Outpatient Cardiac Rehab;OMNI Scale;Home exercise program;Energy conservation;Resuming home activities;Signs and symptoms   Education Packet Given to Patient Yes   All Patient Education Handouts Reviewed with Patient and/or Family Yes   Cardiac Rehab Phase II Plan   Phase II Order Received Yes   Phase II Appointment Status Scheduled   Date/Time 12/19 @1:45pm   Location [facility]   PT Discharge Planning   PT Plan dc   PT Discharge Recommendation (DC Rec) home with assist;home with outpatient cardiac rehab   PT Rationale for DC Rec Pt tolerated session well. Pt limited by decreased activity tolerance, post op status, lifting precautions. Pt at baseline is ind, has stairs at home, lives with significant other. Pt currently ind for bed mobility, amb ind. Able to tolerated treadmill. Anticipate with continued mobilization with nursing staff and IP CR, pt may progress to discharge to home with assist as needed for heavier tasks such as laundry, grocery shopping, cooking, cleaning, etc. OP CR. Will continue to update as appropriate.   PT Brief overview of current status ind with transfers  (Goals of therapy will be to address safe mobility and make recs for d/c to next level of care. Pt and RN will continue to follow all falls risk precautions as documented by RN staff while hospitalized.)   Physical Therapy Time and Intention   Timed Code Treatment Minutes 29   Total Session Time (sum of timed and untimed services) 34

## 2024-12-11 NOTE — DISCHARGE SUMMARY
[facility]  Cardiology Discharge Summary    Date of Admission:  12/10/2024  Date of Discharge:  12/11/2024  Discharging Provider: Gloria Bailon CNP    Discharge Diagnoses   Active Problems:    Aortic stenosis, severe     History of Present Illness   Mike Schultz is a 74-year-old man with a history of CKD, DM2, obesity, chronic Afib with recent stroke while on Xarelto, HFpEF, and severe aortic stenosis who was admitted 12/10/2024 for elective transcatheter aortic valve replacement.    Interval history: No acute events overnight.  His breathing has significantly improved.  He has no chest pain.  Hemodynamically stable.    Hospital Course   # Severe aortic stenosis s/p TAVR with 34 mm Medtronic FX valve   - No complications  - Sheath sites soft without hematoma, neuro's intact  - EKG shows new 1st degree AVB and LBBB   - Cr 0.91, hgb 13.2  - On Xarelto for anticoagulation  - Post-op TTE shows well-seated bioprosthetic aortic valve with MG 8 mmHg, no AI or PVL, LVEF 45-50%    # Chronic diastolic heart failure, NYHA class II  # HFmrEF with LVEF 45-50%, likely ischemic  - NTpBNP 407, EDP 15 mmHg  - Euvolemic on exam.   - PTA bumex 2 mg BID     # Paroxysmal atrial fibrillation   # 1st degree AV block/LBBB, new   - Holding PTA Xarelto  - Not on any AV enrrique blocking agents     # Coronary artery disease with known significant D1 disease  - Pre-TAVR coronary angiogram 11/22 showed significant D1 disease that is being medically managed  - Repeat coronary angiography 12/10 showed severe distal LAD disease with new anteroseptal WMA on echo   - On PTA atorvastatin 20 mg daily and losartan 12.5 mg daily   - No angina     # Recent CVA (9/2024) while on Xarelto   - Felt to be embolic despite being on Xarelto.   - Follows with neurology, contemplated adding ASA or switching to coumadin but no formal recommendation at this time.   - No residual deficits.   - PTA atorvastatin 40 mg daily   - Holding  Xarelto, as above     # Type II diabetes   - sliding scale insulin while inpatient  - PTA tirzepatide     Plan:   - Resume Xarelto 20 mg daily.   - Increase losartan to 25 mg daily for blood pressure management.   - Hold off on BB therapy for now due to new conduction abnormality.   - Will continue medical management of CAD, consider PCI if refractory angina.   - Consider SGLT2i in outpatient setting.   - 30-day MCOT.   - Cardiac rehab.   - Follow-up with structural clinic in 1 week.      Gloria Bailon CNP      Code Status   Full Code    Primary Care Physician   Ranjan Courtney    Physical Exam   Temp: 98.1  F (36.7  C) Temp src: Oral BP: (!) 155/82 Pulse: 84   Resp: 17 SpO2: 94 % O2 Device: None (Room air) Oxygen Delivery: 2 LPM  Vitals:    12/10/24 0743 12/11/24 0604   Weight: 127.9 kg (282 lb) 129.7 kg (285 lb 14.4 oz)     Vital Signs with Ranges  Temp:  [97  F (36.1  C)-99.1  F (37.3  C)] 98.1  F (36.7  C)  Pulse:  [59-88] 84  Resp:  [9-21] 17  BP: (106-170)/(70-97) 155/82  SpO2:  [86 %-98 %] 94 %  I/O last 3 completed shifts:  In: 340 [P.O.:340]  Out: 1300 [Urine:1300]    Constitutional: awake  Eyes: pupils equal, round and reactive to light  Respiratory: No increased work of breathing, good air exchange, clear to auscultation bilaterally, no crackles or wheezing  Cardiovascular: regular rate and rhythm  GI: soft and non tender  Skin: no bruising or bleeding  Neurologic: A&O x 3    Time Spent on this Encounter   IGloria CNP, personally saw the patient today and spent greater than 30 minutes discharging this patient.    Discharge Disposition   Discharged to home  Condition at discharge: Stable    Consultations This Hospital Stay   CARDIAC REHAB IP CONSULT  HOSPITALIST IP CONSULT    Discharge Orders     Discharge Medications   Current Discharge Medication List        CONTINUE these medications which have CHANGED    Details   losartan (COZAAR) 25 MG tablet Take 1 tablet (25 mg) by mouth  daily.  Qty: 45 tablet, Refills: 0    Associated Diagnoses: S/P TAVR (transcatheter aortic valve replacement)           CONTINUE these medications which have NOT CHANGED    Details   allopurinol (ZYLOPRIM) 300 MG tablet TAKE ONE TABLET (300 MG) BY MOUTH ONE TIME DAILY  Qty: 90 tablet, Refills: 0    Associated Diagnoses: Gout of foot, unspecified cause, unspecified chronicity, unspecified laterality      atorvastatin (LIPITOR) 40 MG tablet Take 0.5 tablets (20 mg) by mouth every evening.  Qty: 45 tablet, Refills: 3    Associated Diagnoses: Acute decompensated heart failure (H)      bumetanide (BUMEX) 1 MG tablet 2 mg am and 2 mg pm  Qty: 360 tablet, Refills: 1    Comments: Update profile  Associated Diagnoses: Acute on chronic congestive heart failure, unspecified heart failure type (H)      cetirizine (ZYRTEC) 10 MG tablet Take 10 mg by mouth every evening      finasteride (PROPECIA) 1 MG tablet Take 1 tablet (1 mg) by mouth daily  Qty: 90 tablet, Refills: 2    Associated Diagnoses: Alopecia      ibuprofen (ADVIL/MOTRIN) 200 MG tablet Take 400 mg by mouth every 4 hours as needed for pain (2p801pg=262lq).      potassium chloride pushpa ER (KLOR-CON M20) 20 MEQ CR tablet Take 3 tablets (60 mEq) by mouth 2 times daily.  Qty: 180 tablet, Refills: 3    Associated Diagnoses: Hypokalemia; Benign essential hypertension      predniSONE (DELTASONE) 5 MG tablet Take 10 mg by mouth daily.      rivaroxaban ANTICOAGULANT (XARELTO ANTICOAGULANT) 20 MG TABS tablet Take 1 tablet (20 mg) by mouth daily (with dinner)  Qty: 90 tablet, Refills: 3    Associated Diagnoses: Persistent atrial fibrillation (H)      Tirzepatide 2.5 MG/0.5ML SOAJ Inject 0.5 mLs (2.5 mg) subcutaneously every 7 days.  Qty: 2 mL, Refills: 2    Associated Diagnoses: Type 2 diabetes mellitus with hyperglycemia, without long-term current use of insulin (H); Class 3 severe obesity due to excess calories with serious comorbidity and body mass index (BMI) of 40.0 to 44.9  in adult (H)      Vitamin D3 (CHOLECALCIFEROL) 125 MCG (5000 UT) tablet Take 125 mcg by mouth daily.           STOP taking these medications       aspirin 81 MG EC tablet Comments:   Reason for Stopping:             Allergies   Allergies   Allergen Reactions    Amiodarone Shortness Of Breath     Suspected amiodarone toxicity involving lungs     Data   Most Recent 3 CBC's:  Recent Labs   Lab Test 12/11/24  0527 12/05/24  1107 11/22/24  0906   WBC 11.5* 7.8 7.6   HGB 13.2* 14.9 15.1   MCV 94 96 95    230 226      Most Recent 3 BMP's:  Recent Labs   Lab Test 12/11/24  0723 12/11/24  0527 12/11/24  0154 12/10/24  0748 12/05/24  1107 12/02/24  1000   NA  --  136  --   --  139 138   POTASSIUM  --  3.6  --   --  4.5 3.5   CHLORIDE  --  100  --   --  103 99   CO2  --  27  --   --  27 26   BUN  --  15.6  --   --  12.0 11.2   CR  --  0.91  --   --  0.89 0.95   ANIONGAP  --  9  --   --  9 13   ASHELY  --  8.5*  --   --  9.2 8.9   * 117* 126*   < > 115* 112*    < > = values in this interval not displayed.     Most Recent 2 LFT's:  Recent Labs   Lab Test 12/05/24  1107 09/30/24  1527   AST 21 23   ALT 14 16   ALKPHOS 192* 116   BILITOTAL 0.4 0.5     Most Recent INR's and Anticoagulation Dosing History:  Anticoagulation Dose History  More data exists         Latest Ref Rng & Units 2/26/2019 11/25/2023 11/26/2023 8/1/2024 9/5/2024 11/22/2024 12/5/2024   Recent Dosing and Labs   INR 0.85 - 1.15 2.04  2.62  2.10  2.47  1.97  1.10  1.89       Details                 Most Recent 3 Troponin's:No lab results found.  Most Recent Cholesterol Panel:  Recent Labs   Lab Test 12/02/24  1000   CHOL 140   LDL 75   HDL 39*   TRIG 129     Most Recent 6 Bacteria Isolates From Any Culture (See EPIC Reports for Culture Details):No lab results found.  Most Recent TSH, T4 and A1c Labs:  Recent Labs   Lab Test 09/05/24  0840 11/24/23  1215 01/17/23  1234 10/18/22  0848   TSH  --  2.67   < > 4.33*   T4  --   --   --  1.06   A1C 6.5*  --   --    --     < > = values in this interval not displayed.     Results for orders placed or performed during the hospital encounter of 12/10/24   Echocardiogram Limited     Value    LVEF  45-50%    [facility]    [id number]  [id number]  [id number]  [id number]^AHMET^TARIQ^GIDEON     [facility]  Echocardiography Laboratory  [address] [place] [address]     Name: TOO HOWARD  MRN: [id number]  : 1950  Study Date: 2024 07:54 AM  Age: 74 yrs  Gender: Male  Patient Location: [facility]  Reason For Study: Aortic Valve Replacement  Ordering Physician: TARIQ LEO  Referring Physician: Ranjan Courtney MD  Performed By: Henrique Raza     BSA: 2.4 m2  Height: 68 in  Weight: 285 lb  HR: 80  BP: 136/79 mmHg  ______________________________________________________________________________  Procedure  Limited Echo Adult. Definity (NDC #85248-798) given intravenously.  ______________________________________________________________________________  Interpretation Summary     Left ventricular systolic function is mildly reduced.  The visual ejection fraction is 45-50%.  Distal septal and apical severe hypokinesis.  There is a bioprosthetic aortic valve.  S/P Medtronic 34 Evolute FX  The gradient is normal for this prosthetic aortic valve.(MG 8mm)  No aortic regurgitation is present.  The study was technically limited. Compared to the prior study dated 12/10/24,  there have been no changes.  ______________________________________________________________________________  Left Ventricle  Left ventricular systolic function is mildly reduced. The visual ejection  fraction is 45-50%. Distal septal and apical severe hypokinesis.     Right Ventricle  The right ventricle is normal size. The right ventricular systolic function is  normal.     Mitral Valve  There is mild mitral annular calcification. There is mild (1+) mitral  regurgitation.     Aortic Valve  No aortic regurgitation is present. The mean AoV  pressure gradient is 8.3  mmHg. There is a bioprosthetic aortic valve. The gradient is normal for this  prosthetic aortic valve.     ______________________________________________________________________________  MMode/2D Measurements & Calculations  LVOT diam: 2.0 cm  LVOT area: 3.2 cm2  EF Biplane: 59.2 %     Doppler Measurements & Calculations  Ao V2 max: 192.7 cm/sec  Ao max PG: 15.0 mmHg  Ao V2 mean: 130.7 cm/sec  Ao mean P.3 mmHg  Ao V2 VTI: 36.8 cm  MARILYN(I,D): 2.3 cm2  MARILYN(V,D): 2.2 cm2  Ao acc time: 0.08 sec  LV V1 max P.7 mmHg  LV V1 max: 129.7 cm/sec  LV V1 VTI: 25.5 cm  SV(LVOT): 82.9 ml  SI(LVOT): 34.9 ml/m2  AV Seamus Ratio (DI): 0.67  MARILYN Index (cm2/m2): 0.95  RV S Seamus: 12.3 cm/sec     ______________________________________________________________________________  Report approved by: Trevin Zuleta MD on 2024 09:12 AM         Cardiac Catheterization    Narrative      2nd Diag lesion is 75% stenosed.    Dist LAD lesion is 95% stenosed.    Lifestyle-limiting severe aortic stenosis.  Successful transfemoral transcatheter aortic valve replacement with a 34   mm Medtronic Evolut FX valve.  Temporary pacemaker insertion.  Left heart catheterization with LVEDP of ~15 mmHg.  Right and left arteriotomies were closed with a closure devices.  Severe distal LAD 90% focal stenosis with new anteroseptal wall motion   abnormality on most recent echocardiogram.  No PVL.         *Note: Due to a large number of results and/or encounters for the requested time period, some results have not been displayed. A complete set of results can be found in Results Review.

## 2024-12-11 NOTE — DISCHARGE INSTRUCTIONS
TAVR Discharge Instructions  You have just had your aortic valve replaced with a new biological tissue valve. You should feel better after your surgery, but complete recovery may take several weeks. Please follow these instructions carefully and please call your valve coordinator with any questions or concerns.      CONTACT INFORMATION:   [facility]:  [phone] (7 days a week)  Scheduling and general questions - Donna ([phone])  Valve Coordinators - Jackie ARMANDO ([phone]) or Yvonne ARMANDO ([phone])    AFTER YOU GO HOME:  Drink extra fluids for 2 days.  You may resume your normal diet.  Do not smoke.  Do not drink alcohol.  Relax and take it easy.  Do not make any important or legal decisions.    FOR 1 WEEK AFTER TAVR:  Do not drive or operate machines at home or at work. No driving until you return for your 1 week follow-up appointment. You and the provider will discuss this at the appointment.   Refrain from sexual intercourse for 1 week.  You may shower the day after your procedure. Do NOT take a bath, or use a hot tub or pool for at least 1 week. Do NOT scrub the site. Do not use lotion or powder near the puncture site.  Do not lift more than 10 pounds.   Do not do any heavy activity such as exercise, lifting, or straining.  No housework, yard work, or any activities that make you sweat.    CARE OF WRIST AND GROIN SITES:  For 2 days, when you cough, sneeze, laugh or move your bowels, hold your hand over the puncture site and press firmly on/above the site.  Remove the bandage after 24 hours. If there is minor oozing, apply another bandage and remove it after 12 hours.  It is normal to have bruising or pea size lump at the site.  If you have a wrist site puncture: Do not use your hand or arm to support your weight (such as rising from a chair) or bend your wrist (such as lifting a garage door) for 1 week.  No stooping or squatting for 1 week.     BLEEDING:  If you start  bleeding from the site in your wrist:  Sit down and press firmly on/above the site with your fingers for 10 minutes.   Once bleeding stops, keep your arm still for 2 hours.   Call [facility] or valve coordinator as soon as you can.  If you start bleeding from the site in your groin:  Lie down flat and press firmly on/above the site for 10 minutes.  Once bleeding stops lay flat for 2 hours.   Call [facility] or valve coordinator as soon as you can.  Call 911 right away if you have heavy bleeding or bleeding that does not stop.    MEDICINES:  You may be started on an anti-platelet medication, such as Plavix or aspirin. Please call the [facility] with any questions regarding this medication.  If you have stopped any medicines, check with your provider about when to restart them.  You will require lifetime prophylactic antibiotics for dental cleanings and dental work after your TAVR, please inquire with the Heart Clinic prior to your scheduled cleanings.     FOLLOW-UP APPOINTMENTS:  Follow-up with a Structural Heart advanced practice provider (NP or PA) at [facility] in 7-10 days after your procedure.  You will also follow-up at [facility] 1 month after your procedure which will include a visit with an advanced practice provider an echocardiogram (echo), an electrocardiogram (ECG), and lab work. This follow-up should be scheduled for you prior to you leaving the hospital.  Cardiac Rehab will contact you for follow up care. You can enroll at a site convenient to you.  We will have you see your primary cardiologist about 6 months after your TAVR.  We will see you 1 year after your TAVR with a visit with an advanced practice provider (NP or PA) an echocardiogram (echo), electrocardiogram (ECG), and lab work.     CALL THE CLINIC IF:   You have increased pain or a large or growing hard lump around the site.  The site is  red, swollen, hot, or tender.  Blood or fluid is draining from the site.  You have chills or a fever greater than 101 F (38 C).  Your arm or leg feels numb, cool, or changes color.  You have hives, a rash, or unusual itching.  New pain in the back or belly that you cannot control with Tylenol.  Any questions or concerns.    OTHER INSTRUCTIONS:  You will receive a card with your new valve information in the mail, directly from the . Retain this card with your health care records.    DENTAL WORK:  You must have antibiotics before all dental work to prevent endocarditis, or an infection on your new heart valve. Please call the valve coordinators to get a prescription for this. Please do not schedule any dental cleanings for at least 3-6 months after your TAVR.

## 2024-12-11 NOTE — PLAN OF CARE
Goal Outcome Evaluation:  Neuro- A&OX4, Neuro's intact except baseline fixed right pupil, mild left facial droop and mild slurred speech  Most Recent Vitals- Temp: 97.8  F (36.6  C) Temp src: Oral BP: 124/87 Pulse: 63   Resp: 14 SpO2: 98 % O2 Device: None (Room air)   Tele/Cardiac- SR with 1st degree AVB +BBB+. Noted ST elevation on Teli, pt denies any chest pain.  EKG done & cards updated.   Resp- RA  Activity- SBA  Pain- Denies   Drips- none   Drains/Tubes- P-IVX2 SL  Skin- Bilateral groin site, CDI, CMS intact   GI/- WDL  Aggression Color- Green  COVID status- Negative  Plan- ECHO and cardiac rehab tomorrow   Critical access hospitalc-     Osito Ferrell RN

## 2024-12-11 NOTE — PLAN OF CARE
Cardiac Rehab Discharge Summary    Reason for therapy discharge:    Discharged to home with outpatient therapy.  All goals and outcomes met, no further needs identified.    Progress towards therapy goal(s). See goals on Care Plan in [facility] electronic health record for goal details.  Goals met    Therapy recommendation(s):    Continued therapy is recommended.  Rationale/Recommendations:   .  Continue home exercise program. Pt tolerated session well. Pt limited by decreased activity tolerance, post op status, lifting precautions. Pt at baseline is ind, has stairs at home, lives with significant other. Pt currently ind for bed mobility, amb ind. Able to tolerated treadmill. Anticipate with continued mobilization with nursing staff and IP CR, pt may progress to discharge to home with assist as needed for heavier tasks such as laundry, grocery shopping, cooking, cleaning, etc. OP CR. Will continue to update as appropriate.  PT Brief overview of current status: ind with transfers (Goals of therapy will be to address safe mobility and make recs for d/c to next level of care. Pt and RN will continue to follow all falls risk precautions as documented by RN staff while hospitalized.)

## 2024-12-12 ENCOUNTER — PATIENT OUTREACH (OUTPATIENT)
Dept: CARE COORDINATION | Facility: CLINIC | Age: 74
End: 2024-12-12
Payer: COMMERCIAL

## 2024-12-12 ENCOUNTER — NURSE TRIAGE (OUTPATIENT)
Dept: INTERNAL MEDICINE | Facility: CLINIC | Age: 74
End: 2024-12-12
Payer: COMMERCIAL

## 2024-12-12 DIAGNOSIS — M10.9 GOUT OF FOOT, UNSPECIFIED CAUSE, UNSPECIFIED CHRONICITY, UNSPECIFIED LATERALITY: ICD-10-CM

## 2024-12-12 LAB
ATRIAL RATE - MUSE: 65 BPM
ATRIAL RATE - MUSE: 72 BPM
ATRIAL RATE - MUSE: 77 BPM
DIASTOLIC BLOOD PRESSURE - MUSE: NORMAL MMHG
INTERPRETATION ECG - MUSE: NORMAL
P AXIS - MUSE: 25 DEGREES
P AXIS - MUSE: 40 DEGREES
P AXIS - MUSE: 52 DEGREES
PR INTERVAL - MUSE: 204 MS
PR INTERVAL - MUSE: 224 MS
PR INTERVAL - MUSE: 232 MS
QRS DURATION - MUSE: 150 MS
QRS DURATION - MUSE: 152 MS
QRS DURATION - MUSE: 156 MS
QT - MUSE: 506 MS
QT - MUSE: 506 MS
QT - MUSE: 542 MS
QTC - MUSE: 554 MS
QTC - MUSE: 563 MS
QTC - MUSE: 572 MS
R AXIS - MUSE: -48 DEGREES
R AXIS - MUSE: -63 DEGREES
R AXIS - MUSE: 238 DEGREES
SYSTOLIC BLOOD PRESSURE - MUSE: NORMAL MMHG
T AXIS - MUSE: 76 DEGREES
T AXIS - MUSE: 94 DEGREES
T AXIS - MUSE: 94 DEGREES
VENTRICULAR RATE- MUSE: 65 BPM
VENTRICULAR RATE- MUSE: 72 BPM
VENTRICULAR RATE- MUSE: 77 BPM

## 2024-12-12 RX ORDER — ALLOPURINOL 300 MG/1
TABLET ORAL
Qty: 90 TABLET | Refills: 2 | Status: SHIPPED | OUTPATIENT
Start: 2024-12-12

## 2024-12-12 NOTE — PROGRESS NOTES
[facility] Transitions of Care Outreach  Chief Complaint   Patient presents with    Clinic Care Coordination - Post Hospital       Most Recent Admission Date: 12/10/2024   Most Recent Admission Diagnosis: Nonrheumatic aortic (valve) stenosis - I35.0     Most Recent Discharge Date: 12/11/2024   Most Recent Discharge Diagnosis: Nonrheumatic aortic (valve) stenosis - I35.0  S/P TAVR (transcatheter aortic valve replacement) - Z95.2     Transitions of Care Assessment    Discharge Assessment  How are you doing now that you are home?: Writer called patient today who says he is doing well, but he reports that his watch is stating that he is in AFIB, writer warm transfered him to nursing triage line.  How are your symptoms? (Red Flag symptoms escalate to triage hotline per guidelines): Improved  Do you know how to contact your clinic care team if you have future questions or changes to your health status? : Yes  Does the patient have their discharge instructions? : Yes  Does the patient have questions regarding their discharge instructions? : No  Were you started on any new medications or were there changes to any of your previous medications? : Yes  Does the patient have all of their medications?: Yes  Do you have questions regarding any of your medications? : No  Do you have all of your needed medical supplies or equipment (DME)?  (i.e. oxygen tank, CPAP, cane, etc.): Yes                  Follow up Plan   Follow-up with a Structural Heart advanced practice provider (NP or PA) at [facility] in 7-10 days after your procedure.  You will also follow-up at [facility] 1 month after your  procedure which will include a visit with an advanced practice provider allan Schultz (MRN: [id number])   Cardiac Rehab will contact you for follow up care. You can enroll at a site  convenient to you.  We will have you see your primary cardiologist about 6 months after  your TAVR.  We will see you 1 year after your TAVR with a visit with an advanced practice  provider (NP or PA) an echocardiogram (echo), electrocardiogram (ECG), and lab  Discharge Follow-Up  Discharge follow up appointment scheduled in alignment with recommended follow up timeframe or Transitions of Risk Category? (Low = within 30 days; Moderate= within 14 days; High= within 7 days): Yes  Discharge Follow Up Appointment Date: 12/19/24  Discharge Follow Up Appointment Scheduled with?: Specialty Care Provider    Future Appointments   Date Time Provider Department Center   12/19/2024 11:20 AM Gloria Bailon CNP SUUMHT [facility] PSA CLIN   12/19/2024  2:00 PM 3, Sh Cardiac Rehab [facility]   12/31/2024  8:30 AM Ranjan Courtney MD OXIM OX   1/24/2025 10:00 AM [id number] SHCVCV CVIMG   1/24/2025 11:15 AM TINAJERO LAB [facility]   1/24/2025  1:20 PM [facility]   2/27/2025 10:30 AM UC PFL D [facility]   2/27/2025 11:30 AM UC PFL 6 MINUTE WALK 2 [facility]   2/27/2025 12:00 PM Vinayak Valles MD [facility]   3/4/2025  3:30 PM Orin Edmonds DO [facility]       Outpatient Plan as outlined on AVS reviewed with patient.    For any urgent concerns, please contact our 24 hour nurse triage line: [phone])       BRITTA Horowitz (she/her/hers)  Social Work Clinic Care Coordinator   [facility]  [email or web address]  [phone]

## 2024-12-12 NOTE — TELEPHONE ENCOUNTER
Patient was transferred to writer from care coordinator that was doing a follow up call.     Patient had TAVR done 2 days ago and states he is in A-fib now.  He is not having any difficulty  breathing or acute symptoms presently.  He states this happens frequently, but concerned due to how soon after procedure.    Advised to go to ER.    Writer also attempted to reach CNP at cardiology, but was unable to reach her.  Provided patient with the phone number to cardiology office, but stated would still recommend to go to ER.  Cardiology clinic is located in same hospital as ER.    Patient plans to go to ER.  Verified that he is not alone an could have someone drive him.    AVRIL Elizabeth RN  [facility]     [FreeTextEntry1] : 14 year old boy with autism and intellectual disability. He has been on Abilify for aggression. He has partial complex seizures. He failed Keppra, now off it since Dec 2019. Trileptal was added on 5/16/19 after he had another seizure. In Oct 2019, I instructed parents to increase Trileptal dose because he had the seizure but they did not because of misunderstanding on instructions. Despite that, no seizure recurrence. Exam is unchanged\par \par Spent a long time with both parents explaining need to increase meds as previously advised and instructed them to call pharmacy when they need refill. If any issues or confusions to call me. Parents report understanding.\par \par Plan:\par - labs today (peak)\par - increase Trileptal to 600 mg a.m - 750 mg p.m\par - F/U 3-4 months\par All questions answered and explained. Parents report understanding.

## 2024-12-19 ENCOUNTER — OFFICE VISIT (OUTPATIENT)
Dept: CARDIOLOGY | Facility: CLINIC | Age: 74
End: 2024-12-19
Payer: COMMERCIAL

## 2024-12-19 VITALS
HEART RATE: 70 BPM | BODY MASS INDEX: 42.74 KG/M2 | OXYGEN SATURATION: 92 % | WEIGHT: 282 LBS | HEIGHT: 68 IN | DIASTOLIC BLOOD PRESSURE: 64 MMHG | SYSTOLIC BLOOD PRESSURE: 105 MMHG

## 2024-12-19 DIAGNOSIS — Z95.2 S/P TAVR (TRANSCATHETER AORTIC VALVE REPLACEMENT): Primary | ICD-10-CM

## 2024-12-19 DIAGNOSIS — I48.19 PERSISTENT ATRIAL FIBRILLATION (H): ICD-10-CM

## 2024-12-19 DIAGNOSIS — I25.10 CORONARY ARTERY DISEASE INVOLVING NATIVE CORONARY ARTERY OF NATIVE HEART WITHOUT ANGINA PECTORIS: ICD-10-CM

## 2024-12-19 RX ORDER — METOPROLOL SUCCINATE 25 MG/1
25 TABLET, EXTENDED RELEASE ORAL DAILY
Qty: 90 TABLET | Refills: 3 | Status: SHIPPED | OUTPATIENT
Start: 2024-12-19

## 2024-12-19 RX ORDER — ATORVASTATIN CALCIUM 40 MG/1
40 TABLET, FILM COATED ORAL EVERY EVENING
Refills: 3 | Status: CANCELLED | OUTPATIENT
Start: 2024-12-19

## 2024-12-19 NOTE — PROGRESS NOTES
Cardiology Clinic Progress Note  Mike Schultz MRN# [id number]   YOB: 1950 Age: 74 year old     Primary cardiologist: Dr. Zuleta     Reason for visit: s/p TAVR    History of presenting illness:    Mike Schultz is a pleasant 74 year old patient with past medical history significant for CKD, type 2 diabetes, obesity, persistent A-fib with recent stroke while on Xarelto, HFpEF, and severe aortic stenosis status post TAVR with 34 mm Medtronic FX valve on 12/10/24, who is here today for follow-up.     His procedure went well without complication.  He had new 1st degree AV block and LBBB postoperatively.  Postprocedure echocardiogram showed well-seated bioprosthetic aortic valve with mean gradient of 8 mmHg, no AI or PVL, and mildly reduced LV function with an EF of 45 to 50%.  He was tolerating cardiac rehab.  He was placed on 30-day event monitor due to knew conduction abnormality and discharged on POD#1.    Received notification on 12/12/2024 for new onset of atrial fibrillation from [employer] monitoring.  The patient has known atrial fibrillation.  His heart rate was controlled.  He had restarted his Xarelto.  He was feeling short of breath with this and was hoping to pursue cardioversion.  He was instructed to present to ED with worsening symptoms.    Today he notes ongoing shortness of breath, but his symptoms are stable.  He remains in atrial fibrillation with controlled ventricular rates.  He denies chest pain, syncope or near syncope, PND, or orthopnea.  He has no peripheral edema.  His blood pressure is well-controlled.  His weight is stable.           Assessment and Plan:     ASSESSMENT: Pertinent issues addressed at this visit     Severe aortic stenosis s/p TAVR with 34 mm Medtronic FX valve.  Was initially feeling well post TAVR, then became symptomatic with shortness of breath with his atrial fibrillation.  Postprocedure echo showed well-seated valve with normal gradient.  He is otherwise  tolerating cardiac rehab.  Paroxysmal symptomatic atrial fibrillation. Brayden has had symptomatic pAF over the past 10 yrs, s/p PVI in 2017 with redo ablation in 2019 for reconnected PVs. AF recurred again later on requiring multiple cardioversions. He had another ablation 5/2024 with re-isolation of reconnected PVs and LA posterior wall isolation. Pt was on amiodarone in the past but had to be stopped due to pneumonitis. Additionally, sotalol was stopped 9/2024 due to prolonged QTc of 540 ms.  He is currently back in atrial fibrillation with controlled rates, though he continues to be symptomatic with shortness of breath.  He denies recent EtOH use. EKG today shows atrial fibrillation with heart rate of 95 bpm.  On Xarelto for stroke prophylaxis.  Chronic diastolic heart failure with LVEF of 45 to 50%, NYHA class II.  Appears euvolemic on exam, reports shortness of breath as noted above likely in the setting of atrial fibrillation.  On Bumex 2 mg twice daily.  Transient first-degree AV block and LBBB.  Recently removed his event monitor due to malfunction, no alerts over this past week for advanced AV block.  He is not currently on any AV enrrique blocking agents.  EKG today shows A-fib.  Coronary disease with known significant D1 disease.  Medically managing, he denies angina.  Recent CVA while on Xarelto (9/2024).  Following with neurology as outpatient, no residual deficits.      PLAN:     Start Toprol-XL 25 mg daily.   Continue outpatient cardiac rehab.  Okay to remain off monitor for now, he will let us know if he has any concerning symptoms.   Given recurrent symptomatic atrial fibrillation, recommend follow-up with his EP team.  Brayden was hoping to be scheduled for cardioversion, but we discussed given the persistent nature of his atrial fibrillation, this is likely not going to be successful for him.  He was on sotalol in the past prior to his last cardioversion, but this has since been stopped due to prolonged  "QTc.  Per Dr. Day, it appears the next option would be AV node ablation and permanent pacemaker implantation.  Follow-up with me in 1 month with repeat echocardiogram, labs, and EKG.         Gloria Bailon DNP, APRN, CNP  Page: [phone] (8a-5p M-F)    Orders this Visit:  Orders Placed This Encounter   Procedures    Follow-Up with Cardiology EP    EKG 12-lead complete w/read - Clinics (performed today)     Orders Placed This Encounter   Medications    metoprolol succinate ER (TOPROL XL) 25 MG 24 hr tablet     Sig: Take 1 tablet (25 mg) by mouth daily.     Dispense:  90 tablet     Refill:  3     There are no discontinued medications.    Today's clinic visit entailed:  Review of the result(s) of each unique test - echo, labs, EKG, TAVR  Ordering of each unique test  Prescription drug management  35 minutes spent by me on the date of the encounter doing chart review, review of test results, interpretation of tests, patient visit, and documentation   Provider  Link to [facility] Help Grid     The level of medical decision making during this visit was of moderate complexity.           Review of Systems:     Review of Systems:  Skin:        Eyes:       ENT:       Respiratory:  Positive for shortness of breath, dyspnea on exertion, sleep apnea, CPAP, wheezing  Cardiovascular:    Positive for, lightheadedness, dizziness, syncope or near-syncope, fatigue  Gastroenterology:      Genitourinary:       Musculoskeletal:       Neurologic:       Psychiatric:  Positive for sleep disturbances  Heme/Lymph/Imm:       Endocrine:  Negative              Physical Exam:   Vitals: /64   Pulse 70   Ht 1.727 m (5' 8\")   Wt 127.9 kg (282 lb)   SpO2 92%   BMI 42.88 kg/m    Constitutional:  cooperative overweight      Skin:  warm and dry to the touch        Head:  normocephalic        Eyes:  pupils equal and round        ENT:           Neck:  JVP normal        Chest:  normal breath sounds, clear to auscultation, normal A-P diameter, " normal symmetry, normal respiratory excursion, no use of accessory muscles        Cardiac:   irregularly irregular rhythm                Abdomen:  abdomen soft obese      Vascular: pulses full and equal                               right femoral bruit (-)      Extremities and Back:  no edema        Neurological:  no gross motor deficits, affect appropriate             Medications:     Current Outpatient Medications   Medication Sig Dispense Refill    allopurinol (ZYLOPRIM) 300 MG tablet TAKE ONE TABLET (300 MG) BY MOUTH ONE TIME DAILY 90 tablet 2    atorvastatin (LIPITOR) 40 MG tablet Take 0.5 tablets (20 mg) by mouth every evening. (Patient taking differently: Take 40 mg by mouth every evening.) 45 tablet 3    bumetanide (BUMEX) 1 MG tablet 2 mg am and 2 mg pm 360 tablet 1    cetirizine (ZYRTEC) 10 MG tablet Take 10 mg by mouth every evening      finasteride (PROPECIA) 1 MG tablet Take 1 tablet (1 mg) by mouth daily 90 tablet 2    ibuprofen (ADVIL/MOTRIN) 200 MG tablet Take 400 mg by mouth every 4 hours as needed for pain ([id number]=699iy).      losartan (COZAAR) 25 MG tablet Take 1 tablet (25 mg) by mouth daily. 45 tablet 0    metoprolol succinate ER (TOPROL XL) 25 MG 24 hr tablet Take 1 tablet (25 mg) by mouth daily. 90 tablet 3    potassium chloride pushpa ER (KLOR-CON M20) 20 MEQ CR tablet Take 3 tablets (60 mEq) by mouth 2 times daily. 180 tablet 3    predniSONE (DELTASONE) 5 MG tablet Take 10 mg by mouth daily.      rivaroxaban ANTICOAGULANT (XARELTO ANTICOAGULANT) 20 MG TABS tablet Take 1 tablet (20 mg) by mouth daily (with dinner) 90 tablet 3    Tirzepatide 2.5 MG/0.5ML SOAJ Inject 0.5 mLs (2.5 mg) subcutaneously every 7 days. 2 mL 2    Vitamin D3 (CHOLECALCIFEROL) 125 MCG (5000 UT) tablet Take 125 mcg by mouth daily.         Family History   Problem Relation Age of Onset    Family History Negative Mother     Heart Disease Father         decesased at 42 - MI    Heart Disease Sister          MI     Lipids Sister     Lipids Sister     No Known Problems Sister     Heart Disease Brother          MI    Heart Disease Brother         CABG AT 49    Lung Cancer No family hx of        Social History     Socioeconomic History    Marital status: Single     Spouse name: Not on file    Number of children: Not on file    Years of education: Not on file    Highest education level: Not on file   Occupational History    Not on file   Tobacco Use    Smoking status: Former     Current packs/day: 0.00     Average packs/day: 1 pack/day for 34.6 years (34.6 ttl pk-yrs)     Types: Cigarettes     Start date:      Quit date: 2011     Years since quittin.3     Passive exposure: Past    Smokeless tobacco: Never    Tobacco comments:     quit    Vaping Use    Vaping status: Never Used   Substance and Sexual Activity    Alcohol use: Not Currently    Drug use: Never    Sexual activity: Not Currently     Partners: Female     Birth control/protection: None   Other Topics Concern    Parent/sibling w/ CABG, MI or angioplasty before 65F 55M? Not Asked   Social History Narrative    Worked in retail (-) and insurance sales     Spends some time on [place] on [place]     Social Drivers of Health     Financial Resource Strain: Low Risk  (12/10/2024)    Financial Resource Strain     Within the past 12 months, have you or your family members you live with been unable to get utilities (heat, electricity) when it was really needed?: No   Food Insecurity: Low Risk  (12/10/2024)    Food Insecurity     Within the past 12 months, did you worry that your food would run out before you got money to buy more?: No     Within the past 12 months, did the food you bought just not last and you didn t have money to get more?: No   Transportation Needs: Low Risk  (12/10/2024)    Transportation Needs     Within the past 12 months, has lack of transportation kept you from medical appointments, getting your medicines, non-medical  meetings or appointments, work, or from getting things that you need?: No   Physical Activity: Not on file   Stress: Not on file   Social Connections: Not on file   Interpersonal Safety: Low Risk  (12/10/2024)    Interpersonal Safety     Do you feel physically and emotionally safe where you currently live?: Yes     Within the past 12 months, have you been hit, slapped, kicked or otherwise physically hurt by someone?: No     Within the past 12 months, have you been humiliated or emotionally abused in other ways by your partner or ex-partner?: No   Housing Stability: Low Risk  (12/10/2024)    Housing Stability     Do you have housing? : Yes     Are you worried about losing your housing?: No            Past Medical History:     Past Medical History:   Diagnosis Date    (HFpEF) heart failure with preserved ejection fraction (H)     Acute gouty arthritis     Alcohol use disorder in remission     Amiodarone pulmonary toxicity     Aortic stenosis     Basal cell carcinoma     Of the nose    Bell's palsy     Herpes simplex without mention of complication     Hyperlipidemia     Hypertension 2000    Persistent atrial fibrillation (H)     persistent, DCCV 12/2017, ablation 11/6/17    Pulmonary nodules     Sleep apnea     CPAP              Past Surgical History:     Past Surgical History:   Procedure Laterality Date    ABLATION OF DYSRHYTHMIC FOCUS  2018, 04/12/2019    Procedure: EP Ablation Focal AFIB;  Surgeon: Fady Day MD;  Location:  HEART CARDIAC CATH LAB    ACHILLES TENDON SURGERY  1997    ANESTHESIA CARDIOVERSION N/A 2/26/2019    Procedure: ANESTHESIA CARDIOVERSION (CONNER);  Surgeon: GENERIC ANESTHESIA PROVIDER;  Location:  OR    ANESTHESIA CARDIOVERSION N/A 11/19/2020    Procedure: ANESTHESIA, FOR CARDIOVERSION (dr campbell);  Surgeon: GENERIC ANESTHESIA PROVIDER;  Location:  OR    ANESTHESIA CARDIOVERSION N/A 8/22/2022    Procedure: CARDIOVERSION;  Surgeon: GENERIC ANESTHESIA PROVIDER;  Location:  OR     ANESTHESIA CARDIOVERSION N/A 9/13/2023    Procedure: Anesthesia cardioversion;  Surgeon: GENERIC ANESTHESIA PROVIDER;  Location:  OR    ANESTHESIA CARDIOVERSION N/A 10/6/2023    Procedure: Anesthesia cardioversion;  Surgeon: GENERIC ANESTHESIA PROVIDER;  Location:  OR    ANESTHESIA CARDIOVERSION N/A 8/1/2024    Procedure: Anesthesia cardioversion;  Surgeon: GENERIC ANESTHESIA PROVIDER;  Location:  OR    BASAL CELL CARCINOMA EXCISION Right 2009    nose, took cartilage from inner ear.    CARDIOVERSION  2012, 2019    cardioversion for atr fib    COLONOSCOPY  2008    COLONOSCOPY N/A 3/22/2023    Procedure: COLONOSCOPY, FLEXIBLE, WITH LESION REMOVAL USING SNARE;  Surgeon: Anne Lucero MD;  Location:  GI    CV CORONARY ANGIOGRAM N/A 11/22/2024    Procedure: Coronary Angiogram;  Surgeon: Murphy Mccann MD;  Location:  HEART CARDIAC CATH LAB    CV CORONARY ANGIOGRAM N/A 12/10/2024    Procedure: Coronary Angiogram;  Surgeon: Ricky Muse MD;  Location:  HEART CARDIAC CATH LAB    CV RIGHT HEART CATH MEASUREMENTS RECORDED N/A 7/31/2024    Procedure: Right Heart Cath;  Surgeon: Adriano Lazo MD;  Location:  HEART CARDIAC CATH LAB    CV RIGHT HEART CATH MEASUREMENTS RECORDED N/A 11/22/2024    Procedure: Right Heart Catheterization;  Surgeon: Murphy Mccann MD;  Location:  HEART CARDIAC CATH LAB    CV TRANSCATHETER AORTIC VALVE REPLACEMENT-FEMORAL APPROACH N/A 12/10/2024    Procedure: Transcatheter Aortic Valve Replacement-Femoral Approach;  Surgeon: Ricky Muse MD;  Location:  HEART CARDIAC CATH LAB    EP ABLATION FOCAL AFIB N/A 4/12/2019    Procedure: EP Ablation Focal AFIB;  Surgeon: Fady Day MD;  Location:  HEART CARDIAC CATH LAB    EP ABLATION FOCAL AFIB N/A 5/2/2024    Procedure: Ablation Atrial Fibrilation;  Surgeon: Fady Day MD;  Location:  HEART CARDIAC CATH LAB    HERNIA REPAIR, UMBILICAL  08/20/2012    Procedure: HERNIORRHAPHY  UMBILICAL;  UMBILICAL HERNIA REPAIR;  Surgeon: Ra Crocker MD;  Location: [facility]    HERNIORRHAPHY UMBILICAL  8/20/2012    Procedure: HERNIORRHAPHY UMBILICAL;  UMBILICAL HERNIA REPAIR;  Surgeon: Ra Crocker MD;  Location: [facility]    ORTHOPEDIC SURGERY      ORTHOPEDIC SURGERY      OTHER SURGICAL HISTORY Right 03/2017    total KNEE ARTHROSCOPY     TOTAL KNEE ARTHROPLASTY Left 03/16/2018    ZZC NONSPECIFIC PROCEDURE  1997    achilles tendon    ZZC NONSPECIFIC PROCEDURE      knees, arthroscopy    ZZC NONSPECIFIC PROCEDURE      basal cell skin ca, nose    ZZC NONSPECIFIC PROCEDURE      flex sig; colonoscopy due 3/2008    ZZC NONSPECIFIC PROCEDURE  2012    cardioversion for atr fib    ZZC TOTAL KNEE ARTHROPLASTY Left 03/16/2018    Dr. Malick Suarez              Allergies:   Amiodarone       Data:   All laboratory data reviewed:    Recent Labs   Lab Test 12/05/24  1107 12/02/24  1000 09/30/24  1527 09/05/24  1138 09/04/24  1021 08/15/24  1433 11/24/23  1215 07/27/23  0955 01/17/23  1234 10/18/22  0848   LDL  --  75  --  115*  --   --   --  97  --   --    HDL  --  39*  --  35*  --   --   --  39*  --   --    NHDL  --  101  --  152*  --   --   --  124  --   --    CHOL  --  140  --  187  --   --   --  163  --   --    TRIG  --  129  --  187*  --   --   --  133  --   --    TSH  --   --   --   --   --   --  2.67  --  2.53 4.33*   NTBNP 407  --  391  --  654   < > 274  --   --   --     < > = values in this interval not displayed.       Lab Results   Component Value Date    WBC 11.5 (H) 12/11/2024    WBC 9.8 09/21/2020    RBC 4.16 (L) 12/11/2024    RBC 4.47 09/21/2020    HGB 13.2 (L) 12/11/2024    HGB 14.4 09/21/2020    HCT 39.1 (L) 12/11/2024    HCT 44.7 09/21/2020    MCV 94 12/11/2024     09/21/2020    MCH 31.7 12/11/2024    MCH 32.2 09/21/2020    MCHC 33.8 12/11/2024    MCHC 32.2 09/21/2020    RDW 14.4 12/11/2024    RDW 13.3 09/21/2020     12/11/2024     09/21/2020       Lab Results   Component  Value Date     12/11/2024     06/30/2021    POTASSIUM 3.6 12/11/2024    POTASSIUM 4.6 12/17/2022    POTASSIUM 3.6 06/30/2021    CHLORIDE 100 12/11/2024    CHLORIDE 106 12/17/2022    CHLORIDE 109 06/30/2021    CO2 27 12/11/2024    CO2 30 12/17/2022    CO2 27 06/30/2021    ANIONGAP 9 12/11/2024    ANIONGAP 4 12/17/2022    ANIONGAP 5 06/30/2021     (H) 12/11/2024     (H) 12/11/2024     (H) 12/17/2022    GLC 89 06/30/2021    BUN 15.6 12/11/2024    BUN 15 12/17/2022    BUN 13 06/30/2021    CR 0.91 12/11/2024    CR 1.06 06/30/2021    GFRESTIMATED 89 12/11/2024    GFRESTIMATED 71 06/30/2021    GFRESTBLACK 82 06/30/2021    ASHELY 8.5 (L) 12/11/2024    ASHELY 8.7 06/30/2021      Lab Results   Component Value Date    AST 21 12/05/2024    AST 24 06/30/2021    ALT 14 12/05/2024    ALT 42 06/30/2021       Lab Results   Component Value Date    A1C 6.5 (H) 09/05/2024    A1C 5.7 (H) 02/25/2019       Lab Results   Component Value Date    INR 1.89 (H) 12/05/2024    INR 1.10 11/22/2024    INR 2.04 (H) 02/26/2019    INR 1.66 (H) 11/06/2017

## 2024-12-19 NOTE — LETTER
12/19/2024    Ranjan Courtney MD  [address] [facility] [address]    RE: Mike Schultz       Dear Colleague,     I had the pleasure of seeing Mike Schultz in the [facility].  Cardiology Clinic Progress Note  Mike Schultz MRN# [id number]   YOB: 1950 Age: 74 year old     Primary cardiologist: Dr. Zuleta     Reason for visit: s/p TAVR    History of presenting illness:    Mike Schultz is a pleasant 74 year old patient with past medical history significant for CKD, type 2 diabetes, obesity, persistent A-fib with recent stroke while on Xarelto, HFpEF, and severe aortic stenosis status post TAVR with 34 mm Medtronic FX valve on 12/10/24, who is here today for follow-up.     His procedure went well without complication.  He had new 1st degree AV block and LBBB postoperatively.  Postprocedure echocardiogram showed well-seated bioprosthetic aortic valve with mean gradient of 8 mmHg, no AI or PVL, and mildly reduced LV function with an EF of 45 to 50%.  He was tolerating cardiac rehab.  He was placed on 30-day event monitor due to knew conduction abnormality and discharged on POD#1.    Received notification on 12/12/2024 for new onset of atrial fibrillation from [facility] monitoring.  The patient has known atrial fibrillation.  His heart rate was controlled.  He had restarted his Xarelto.  He was feeling short of breath with this and was hoping to pursue cardioversion.  He was instructed to present to ED with worsening symptoms.    Today he notes ongoing shortness of breath, but his symptoms are stable.  He remains in atrial fibrillation with controlled ventricular rates.  He denies chest pain, syncope or near syncope, PND, or orthopnea.  He has no peripheral edema.  His blood pressure is well-controlled.  His weight is stable.           Assessment and Plan:     ASSESSMENT: Pertinent issues addressed at this visit     Severe aortic stenosis s/p TAVR with 34 mm Medtronic FX valve.   Was initially feeling well post TAVR, then became symptomatic with shortness of breath with his atrial fibrillation.  Postprocedure echo showed well-seated valve with normal gradient.  He is otherwise tolerating cardiac rehab.  Paroxysmal symptomatic atrial fibrillation. Brayden has had symptomatic pAF over the past 10 yrs, s/p PVI in 2017 with redo ablation in 2019 for reconnected PVs. AF recurred again later on requiring multiple cardioversions. He had another ablation 5/2024 with re-isolation of reconnected PVs and LA posterior wall isolation. Pt was on amiodarone in the past but had to be stopped due to pneumonitis. Additionally, sotalol was stopped 9/2024 due to prolonged QTc of 540 ms.  He is currently back in atrial fibrillation with controlled rates, though he continues to be symptomatic with shortness of breath.  He denies recent EtOH use. EKG today shows atrial fibrillation with heart rate of 95 bpm.  On Xarelto for stroke prophylaxis.  Chronic diastolic heart failure with LVEF of 45 to 50%, NYHA class II.  Appears euvolemic on exam, reports shortness of breath as noted above likely in the setting of atrial fibrillation.  On Bumex 2 mg twice daily.  Transient first-degree AV block and LBBB.  Recently removed his event monitor due to malfunction, no alerts over this past week for advanced AV block.  He is not currently on any AV enrrique blocking agents.  EKG today shows A-fib.  Coronary disease with known significant D1 disease.  Medically managing, he denies angina.  Recent CVA while on Xarelto (9/2024).  Following with neurology as outpatient, no residual deficits.      PLAN:     Start Toprol-XL 25 mg daily.   Continue outpatient cardiac rehab.  Okay to remain off monitor for now, he will let us know if he has any concerning symptoms.   Given recurrent symptomatic atrial fibrillation, recommend follow-up with his EP team.  Brayden was hoping to be scheduled for cardioversion, but we discussed given the  "persistent nature of his atrial fibrillation, this is likely not going to be successful for him.  He was on sotalol in the past prior to his last cardioversion, but this has since been stopped due to prolonged QTc.  Per Dr. Day, it appears the next option would be AV node ablation and permanent pacemaker implantation.  Follow-up with me in 1 month with repeat echocardiogram, labs, and EKG.         Gloria Bailon, CELE, APRN, CNP  Page: [phone] (8a-5p M-F)    Orders this Visit:  Orders Placed This Encounter   Procedures     Follow-Up with Cardiology EP     EKG 12-lead complete w/read - Clinics (performed today)     Orders Placed This Encounter   Medications     metoprolol succinate ER (TOPROL XL) 25 MG 24 hr tablet     Sig: Take 1 tablet (25 mg) by mouth daily.     Dispense:  90 tablet     Refill:  3     There are no discontinued medications.    Today's clinic visit entailed:  Review of the result(s) of each unique test - echo, labs, EKG, TAVR  Ordering of each unique test  Prescription drug management  35 minutes spent by me on the date of the encounter doing chart review, review of test results, interpretation of tests, patient visit, and documentation   Provider  Link to [facility] Help Grid     The level of medical decision making during this visit was of moderate complexity.           Review of Systems:     Review of Systems:  Skin:        Eyes:       ENT:       Respiratory:  Positive for shortness of breath, dyspnea on exertion, sleep apnea, CPAP, wheezing  Cardiovascular:    Positive for, lightheadedness, dizziness, syncope or near-syncope, fatigue  Gastroenterology:      Genitourinary:       Musculoskeletal:       Neurologic:       Psychiatric:  Positive for sleep disturbances  Heme/Lymph/Imm:       Endocrine:  Negative              Physical Exam:   Vitals: /64   Pulse 70   Ht 1.727 m (5' 8\")   Wt 127.9 kg (282 lb)   SpO2 92%   BMI 42.88 kg/m    Constitutional:  cooperative overweight      Skin:  " warm and dry to the touch        Head:  normocephalic        Eyes:  pupils equal and round        ENT:           Neck:  JVP normal        Chest:  normal breath sounds, clear to auscultation, normal A-P diameter, normal symmetry, normal respiratory excursion, no use of accessory muscles        Cardiac:   irregularly irregular rhythm                Abdomen:  abdomen soft obese      Vascular: pulses full and equal                               right femoral bruit (-)      Extremities and Back:  no edema        Neurological:  no gross motor deficits, affect appropriate             Medications:     Current Outpatient Medications   Medication Sig Dispense Refill     allopurinol (ZYLOPRIM) 300 MG tablet TAKE ONE TABLET (300 MG) BY MOUTH ONE TIME DAILY 90 tablet 2     atorvastatin (LIPITOR) 40 MG tablet Take 0.5 tablets (20 mg) by mouth every evening. (Patient taking differently: Take 40 mg by mouth every evening.) 45 tablet 3     bumetanide (BUMEX) 1 MG tablet 2 mg am and 2 mg pm 360 tablet 1     cetirizine (ZYRTEC) 10 MG tablet Take 10 mg by mouth every evening       finasteride (PROPECIA) 1 MG tablet Take 1 tablet (1 mg) by mouth daily 90 tablet 2     ibuprofen (ADVIL/MOTRIN) 200 MG tablet Take 400 mg by mouth every 4 hours as needed for pain ([id number]).       losartan (COZAAR) 25 MG tablet Take 1 tablet (25 mg) by mouth daily. 45 tablet 0     metoprolol succinate ER (TOPROL XL) 25 MG 24 hr tablet Take 1 tablet (25 mg) by mouth daily. 90 tablet 3     potassium chloride pushpa ER (KLOR-CON M20) 20 MEQ CR tablet Take 3 tablets (60 mEq) by mouth 2 times daily. 180 tablet 3     predniSONE (DELTASONE) 5 MG tablet Take 10 mg by mouth daily.       rivaroxaban ANTICOAGULANT (XARELTO ANTICOAGULANT) 20 MG TABS tablet Take 1 tablet (20 mg) by mouth daily (with dinner) 90 tablet 3     Tirzepatide 2.5 MG/0.5ML SOAJ Inject 0.5 mLs (2.5 mg) subcutaneously every 7 days. 2 mL 2     Vitamin D3 (CHOLECALCIFEROL) 125 MCG (5000 UT) tablet  Take 125 mcg by mouth daily.         Family History   Problem Relation Age of Onset     Family History Negative Mother      Heart Disease Father         decesased at 42 - MI     Heart Disease Sister          MI     Lipids Sister      Lipids Sister      No Known Problems Sister      Heart Disease Brother          MI     Heart Disease Brother         CABG AT 49     Lung Cancer No family hx of        Social History     Socioeconomic History     Marital status: Single     Spouse name: Not on file     Number of children: Not on file     Years of education: Not on file     Highest education level: Not on file   Occupational History     Not on file   Tobacco Use     Smoking status: Former     Current packs/day: 0.00     Average packs/day: 1 pack/day for 34.6 years (34.6 ttl pk-yrs)     Types: Cigarettes     Start date:      Quit date: 2011     Years since quittin.3     Passive exposure: Past     Smokeless tobacco: Never     Tobacco comments:     quit    Vaping Use     Vaping status: Never Used   Substance and Sexual Activity     Alcohol use: Not Currently     Drug use: Never     Sexual activity: Not Currently     Partners: Female     Birth control/protection: None   Other Topics Concern     Parent/sibling w/ CABG, MI or angioplasty before 65F 55M? Not Asked   Social History Narrative    Worked in retail (-) and insurance sales     Spends some time on [place] on [place]     Social Drivers of Health     Financial Resource Strain: Low Risk  (12/10/2024)    Financial Resource Strain      Within the past 12 months, have you or your family members you live with been unable to get utilities (heat, electricity) when it was really needed?: No   Food Insecurity: Low Risk  (12/10/2024)    Food Insecurity      Within the past 12 months, did you worry that your food would run out before you got money to buy more?: No      Within the past 12 months, did the food you bought just not last and  you didn t have money to get more?: No   Transportation Needs: Low Risk  (12/10/2024)    Transportation Needs      Within the past 12 months, has lack of transportation kept you from medical appointments, getting your medicines, non-medical meetings or appointments, work, or from getting things that you need?: No   Physical Activity: Not on file   Stress: Not on file   Social Connections: Not on file   Interpersonal Safety: Low Risk  (12/10/2024)    Interpersonal Safety      Do you feel physically and emotionally safe where you currently live?: Yes      Within the past 12 months, have you been hit, slapped, kicked or otherwise physically hurt by someone?: No      Within the past 12 months, have you been humiliated or emotionally abused in other ways by your partner or ex-partner?: No   Housing Stability: Low Risk  (12/10/2024)    Housing Stability      Do you have housing? : Yes      Are you worried about losing your housing?: No            Past Medical History:     Past Medical History:   Diagnosis Date     (HFpEF) heart failure with preserved ejection fraction (H)      Acute gouty arthritis      Alcohol use disorder in remission      Amiodarone pulmonary toxicity      Aortic stenosis      Basal cell carcinoma     Of the nose     Bell's palsy      Herpes simplex without mention of complication      Hyperlipidemia      Hypertension 2000     Persistent atrial fibrillation (H)     persistent, DCCV 12/2017, ablation 11/6/17     Pulmonary nodules      Sleep apnea     CPAP              Past Surgical History:     Past Surgical History:   Procedure Laterality Date     ABLATION OF DYSRHYTHMIC FOCUS  2018, 04/12/2019    Procedure: EP Ablation Focal AFIB;  Surgeon: Fady Day MD;  Location:  HEART CARDIAC CATH LAB     ACHILLES TENDON SURGERY  1997     ANESTHESIA CARDIOVERSION N/A 2/26/2019    Procedure: ANESTHESIA CARDIOVERSION (CONNER);  Surgeon: GENERIC ANESTHESIA PROVIDER;  Location:  OR     ANESTHESIA CARDIOVERSION  N/A 11/19/2020    Procedure: ANESTHESIA, FOR CARDIOVERSION (dr campbell);  Surgeon: GENERIC ANESTHESIA PROVIDER;  Location:  OR     ANESTHESIA CARDIOVERSION N/A 8/22/2022    Procedure: CARDIOVERSION;  Surgeon: GENERIC ANESTHESIA PROVIDER;  Location: [facility] OR     ANESTHESIA CARDIOVERSION N/A 9/13/2023    Procedure: Anesthesia cardioversion;  Surgeon: GENERIC ANESTHESIA PROVIDER;  Location: [facility]     ANESTHESIA CARDIOVERSION N/A 10/6/2023    Procedure: Anesthesia cardioversion;  Surgeon: GENERIC ANESTHESIA PROVIDER;  Location: [facility]     ANESTHESIA CARDIOVERSION N/A 8/1/2024    Procedure: Anesthesia cardioversion;  Surgeon: GENERIC ANESTHESIA PROVIDER;  Location: [facility] OR     BASAL CELL CARCINOMA EXCISION Right 2009    nose, took cartilage from inner ear.     CARDIOVERSION  2012, 2019    cardioversion for atr fib     COLONOSCOPY  2008     COLONOSCOPY N/A 3/22/2023    Procedure: COLONOSCOPY, FLEXIBLE, WITH LESION REMOVAL USING SNARE;  Surgeon: Anne Lucero MD;  Location:  GI     CV CORONARY ANGIOGRAM N/A 11/22/2024    Procedure: Coronary Angiogram;  Surgeon: Murphy Mccann MD;  Location:  HEART CARDIAC CATH LAB     CV CORONARY ANGIOGRAM N/A 12/10/2024    Procedure: Coronary Angiogram;  Surgeon: Ricky Muse MD;  Location:  HEART CARDIAC CATH LAB     CV RIGHT HEART CATH MEASUREMENTS RECORDED N/A 7/31/2024    Procedure: Right Heart Cath;  Surgeon: Adriano Lazo MD;  Location:  HEART CARDIAC CATH LAB     CV RIGHT HEART CATH MEASUREMENTS RECORDED N/A 11/22/2024    Procedure: Right Heart Catheterization;  Surgeon: Murphy Mccann MD;  Location:  HEART CARDIAC CATH LAB     CV TRANSCATHETER AORTIC VALVE REPLACEMENT-FEMORAL APPROACH N/A 12/10/2024    Procedure: Transcatheter Aortic Valve Replacement-Femoral Approach;  Surgeon: Ricky Muse MD;  Location:  HEART CARDIAC CATH LAB     EP ABLATION FOCAL AFIB N/A 4/12/2019    Procedure: EP Ablation Focal AFIB;  Surgeon: Barb  Fady Ley MD;  Location:  HEART CARDIAC CATH LAB     EP ABLATION FOCAL AFIB N/A 5/2/2024    Procedure: Ablation Atrial Fibrilation;  Surgeon: Fady Day MD;  Location:  HEART CARDIAC CATH LAB     HERNIA REPAIR, UMBILICAL  08/20/2012    Procedure: HERNIORRHAPHY UMBILICAL;  UMBILICAL HERNIA REPAIR;  Surgeon: Ra Crocker MD;  Location: [facility]     HERNIORRHAPHY UMBILICAL  8/20/2012    Procedure: HERNIORRHAPHY UMBILICAL;  UMBILICAL HERNIA REPAIR;  Surgeon: Ra Crocker MD;  Location: [facility]     ORTHOPEDIC SURGERY       ORTHOPEDIC SURGERY       OTHER SURGICAL HISTORY Right 03/2017    total KNEE ARTHROSCOPY      TOTAL KNEE ARTHROPLASTY Left 03/16/2018     ZZC NONSPECIFIC PROCEDURE  1997    achilles tendon     ZZC NONSPECIFIC PROCEDURE      knees, arthroscopy     ZZC NONSPECIFIC PROCEDURE      basal cell skin ca, nose     ZZC NONSPECIFIC PROCEDURE      flex sig; colonoscopy due 3/2008     ZZC NONSPECIFIC PROCEDURE  2012    cardioversion for atr fib     ZZC TOTAL KNEE ARTHROPLASTY Left 03/16/2018    Dr. Malick Suarez              Allergies:   Amiodarone       Data:   All laboratory data reviewed:    Recent Labs   Lab Test 12/05/24  1107 12/02/24  1000 09/30/24  1527 09/05/24  1138 09/04/24  1021 08/15/24  1433 11/24/23  1215 07/27/23  0955 01/17/23  1234 10/18/22  0848   LDL  --  75  --  115*  --   --   --  97  --   --    HDL  --  39*  --  35*  --   --   --  39*  --   --    NHDL  --  101  --  152*  --   --   --  124  --   --    CHOL  --  140  --  187  --   --   --  163  --   --    TRIG  --  129  --  187*  --   --   --  133  --   --    TSH  --   --   --   --   --   --  2.67  --  2.53 4.33*   NTBNP 407  --  391  --  654   < > 274  --   --   --     < > = values in this interval not displayed.       Lab Results   Component Value Date    WBC 11.5 (H) 12/11/2024    WBC 9.8 09/21/2020    RBC 4.16 (L) 12/11/2024    RBC 4.47 09/21/2020    HGB 13.2 (L) 12/11/2024    HGB 14.4 09/21/2020    HCT 39.1 (L) 12/11/2024     HCT 44.7 09/21/2020    MCV 94 12/11/2024     09/21/2020    MCH 31.7 12/11/2024    MCH 32.2 09/21/2020    MCHC 33.8 12/11/2024    MCHC 32.2 09/21/2020    RDW 14.4 12/11/2024    RDW 13.3 09/21/2020     12/11/2024     09/21/2020       Lab Results   Component Value Date     12/11/2024     06/30/2021    POTASSIUM 3.6 12/11/2024    POTASSIUM 4.6 12/17/2022    POTASSIUM 3.6 06/30/2021    CHLORIDE 100 12/11/2024    CHLORIDE 106 12/17/2022    CHLORIDE 109 06/30/2021    CO2 27 12/11/2024    CO2 30 12/17/2022    CO2 27 06/30/2021    ANIONGAP 9 12/11/2024    ANIONGAP 4 12/17/2022    ANIONGAP 5 06/30/2021     (H) 12/11/2024     (H) 12/11/2024     (H) 12/17/2022    GLC 89 06/30/2021    BUN 15.6 12/11/2024    BUN 15 12/17/2022    BUN 13 06/30/2021    CR 0.91 12/11/2024    CR 1.06 06/30/2021    GFRESTIMATED 89 12/11/2024    GFRESTIMATED 71 06/30/2021    GFRESTBLACK 82 06/30/2021    ASHELY 8.5 (L) 12/11/2024    ASHELY 8.7 06/30/2021      Lab Results   Component Value Date    AST 21 12/05/2024    AST 24 06/30/2021    ALT 14 12/05/2024    ALT 42 06/30/2021       Lab Results   Component Value Date    A1C 6.5 (H) 09/05/2024    A1C 5.7 (H) 02/25/2019       Lab Results   Component Value Date    INR 1.89 (H) 12/05/2024    INR 1.10 11/22/2024    INR 2.04 (H) 02/26/2019    INR 1.66 (H) 11/06/2017         Thank you for allowing me to participate in the care of your patient.      Sincerely,     Gloria Bailon, [facility]  cc:   No referring provider defined for this encounter.

## 2024-12-19 NOTE — PATIENT INSTRUCTIONS
Today's Plan:     - Start metoprolol XL 25 mg daily.   - Let us know if you become lightheaded or dizzy.   - Cardiac rehab.   - Antibiotics prior to all dental procedures.   - Follow-up with me in 1 month.   - Follow-up with Dr. Day in EP clinic.      If you have questions or concerns please call my nurse team:  Ginette RN ([phone]) Jackie RN ([phone]) or Yvonne RN ([phone])    After Hours: [phone], option #2, ask for cardiologist on-call    Scheduling phone number: [phone]    Reminder: Please bring in all current medications, over the counter supplements and vitamin bottles to your next appointment.-    It was a pleasure seeing you today!     Gloria Bailon, SCHUYLER  12/19/2024    -

## 2024-12-20 ENCOUNTER — TELEPHONE (OUTPATIENT)
Dept: CARDIOLOGY | Facility: CLINIC | Age: 74
End: 2024-12-20
Payer: COMMERCIAL

## 2024-12-20 NOTE — TELEPHONE ENCOUNTER
Per note below, Dr. Day recommends PPM with LB lead + AVNA.     Per Crystal BRITTON's OV note yesterday:   We discussed the risks, benefits and indications of Atrioventricular Node ablation and pacemaker implantation, including but not limited to use of conscious sedation including need for a , discomfort, peripheral vessel injury, pneumothorax, cardiac puncture and/or tamponade, device malfunction and/or recall, and infection requiring explantation of the device and long term antibiotics. Reviewed he'd be pacemaker dependent We also briefly discussed follow up expectations and restrictions following the procedure. The patient voiced understanding and is willing to proceed.  A consent form will be signed by the procedural physician.   Would need to hold tirzepatide x 1 week  Given CHADSVASc 6, would likely not hold AC but would defer to Device team  Would hold supplements/vitamins/ Bumex AM of procedure   Home same day with         Called pt. Gave him Dr. Day's recommendations. He says he has to think about it. He recalls the discussion at the OV yesterday. He does not have any questions now. He will contact us via [employer] with his decision.

## 2024-12-20 NOTE — TELEPHONE ENCOUNTER
12/20/24 Fady Day MD Moody, Katherine McAllan, PA-C  AVN ablation with left bundle pacing.          Previous Messages       ----- Message -----  From: Natalie Campos PA-C  Sent: 12/20/2024   8:54 AM CST  To: Fady Kent MD; [facility] Ep Nurse  Subject: back in AFib with sxs                            Hi Dr. Day -    Brayden was taken off of sotalol 80 mg BID 9/2024 at time of CVA for permissive BP and QT ~540 ms (had fluctuated in the past) with plan to restart at EP discretion. When you saw him a few days later said you agreed he should stay off of it.    Stayed in SR 9/2024 until TAVR discharge 12/11.       Unsure if d/t no CPAP in hospital, 2 cups of alcoholic eggnog or anesthesia for procedure. Also back on prednisone for pneumonitiis    Has also developed LBBB following TAVR.    As you know, he typically develops HFpEF with AFib, even at controlled HR.    He's had 3 ablations (last 5/2024 with prolonged hospitalization after for HFpEF, pneumonitis)  No amio d/t pneumonitis  No flecainide d/t CAD/scar  Back in 7/2024 you'd suggested dofetilide and DCCV. Ended up being admitted for HF and started on sotalol instead. As above, stopped 80 mg q12 in 9/2024 d/t QT and need for permissive BP      ** Brayden really wants DCCV but explained minimal chance of it working b/c no AAD therapy.      1. RAMIRO/DCCV (stopped Xarelto for TAVR) and cross fingers now that back on CPAP, done with procedure he'll maintain without AAD?  2.  AVN ablation/CRT (has LBBB post TAVR, EF down to 45% post TAVR)? - worry this may not help as he's had sxs/HFpEF despite good HR in past  3. Consider hospital for dofetilide/DCCV?      THANK YOU! Really complex course since I saw 7/2024 but hope I have all the pieces finally put together.  I'm off today but have cc'd EP RNs so they can let him know your recommendation.

## 2024-12-22 ENCOUNTER — HEALTH MAINTENANCE LETTER (OUTPATIENT)
Age: 74
End: 2024-12-22

## 2024-12-26 ENCOUNTER — TELEPHONE (OUTPATIENT)
Dept: CARDIOLOGY | Facility: CLINIC | Age: 74
End: 2024-12-26
Payer: COMMERCIAL

## 2024-12-26 DIAGNOSIS — I48.19 PERSISTENT ATRIAL FIBRILLATION (H): ICD-10-CM

## 2024-12-26 NOTE — TELEPHONE ENCOUNTER
[facility] Call Center    Phone Message    May a detailed message be left on voicemail: yes     Reason for Call: Medication Refill Request    Has the patient contacted the pharmacy for the refill? Yes   Name of medication being requested: rivaroxaban ANTICOAGULANT (XARELTO ANTICOAGULANT) 20 MG TABS tablet    Provider who prescribed the medication: Natalie Campos    Pharmacy: [facility], Ph [phone], Fax [phone]         Action Taken: Message routed to:  Other: [facility]    Travel Screening: Not Applicable     Date of Service:

## 2024-12-26 NOTE — TELEPHONE ENCOUNTER
Discussed that we can not fax prescription to [facility], but pt can. Pt asked that prescription be emailed to pt. Email used id in pt demographics.  Pt will call if email does not arrive. Miguel Ángel

## 2024-12-27 ENCOUNTER — MYC MEDICAL ADVICE (OUTPATIENT)
Dept: CARDIOLOGY | Facility: CLINIC | Age: 74
End: 2024-12-27
Payer: COMMERCIAL

## 2024-12-27 DIAGNOSIS — I48.19 PERSISTENT ATRIAL FIBRILLATION (H): ICD-10-CM

## 2024-12-30 NOTE — TELEPHONE ENCOUNTER
12/30/24 Signed paper rx for Xarelto left at from desk for pt [facility]   [facility]

## 2024-12-31 ENCOUNTER — OFFICE VISIT (OUTPATIENT)
Dept: INTERNAL MEDICINE | Facility: CLINIC | Age: 74
End: 2024-12-31
Attending: INTERNAL MEDICINE
Payer: COMMERCIAL

## 2024-12-31 VITALS
WEIGHT: 283.1 LBS | HEART RATE: 65 BPM | SYSTOLIC BLOOD PRESSURE: 147 MMHG | HEIGHT: 68 IN | DIASTOLIC BLOOD PRESSURE: 85 MMHG | BODY MASS INDEX: 42.9 KG/M2 | TEMPERATURE: 97.3 F | OXYGEN SATURATION: 95 %

## 2024-12-31 DIAGNOSIS — E11.65 TYPE 2 DIABETES MELLITUS WITH HYPERGLYCEMIA, WITHOUT LONG-TERM CURRENT USE OF INSULIN (H): ICD-10-CM

## 2024-12-31 DIAGNOSIS — E66.01 CLASS 3 SEVERE OBESITY DUE TO EXCESS CALORIES WITH SERIOUS COMORBIDITY AND BODY MASS INDEX (BMI) OF 40.0 TO 44.9 IN ADULT (H): ICD-10-CM

## 2024-12-31 DIAGNOSIS — I48.21 ATRIAL FIBRILLATION, PERMANENT (H): Primary | ICD-10-CM

## 2024-12-31 DIAGNOSIS — E66.813 CLASS 3 SEVERE OBESITY DUE TO EXCESS CALORIES WITH SERIOUS COMORBIDITY AND BODY MASS INDEX (BMI) OF 40.0 TO 44.9 IN ADULT (H): ICD-10-CM

## 2024-12-31 DIAGNOSIS — Z23 ENCOUNTER FOR IMMUNIZATION: ICD-10-CM

## 2024-12-31 PROCEDURE — 91320 SARSCV2 VAC 30MCG TRS-SUC IM: CPT | Performed by: INTERNAL MEDICINE

## 2024-12-31 PROCEDURE — 90471 IMMUNIZATION ADMIN: CPT | Performed by: INTERNAL MEDICINE

## 2024-12-31 PROCEDURE — 90480 ADMN SARSCOV2 VAC 1/ONLY CMP: CPT | Performed by: INTERNAL MEDICINE

## 2024-12-31 PROCEDURE — 90662 IIV NO PRSV INCREASED AG IM: CPT | Performed by: INTERNAL MEDICINE

## 2024-12-31 PROCEDURE — 99214 OFFICE O/P EST MOD 30 MIN: CPT | Mod: 25 | Performed by: INTERNAL MEDICINE

## 2024-12-31 PROCEDURE — 99207 PR FOOT EXAM NO CHARGE: CPT | Performed by: INTERNAL MEDICINE

## 2024-12-31 NOTE — PROGRESS NOTES
"Assessment & Plan   Atrial fibrillation, permanent (H)  Reviewed recent hospitalization and cardiology notes. Defer cares to that service, strongly encouraged patient to continue working with them regularly.    Type 2 diabetes mellitus with hyperglycemia, without long-term current use of insulin (H)  Due for eye exam, encouraged patient to schedule. Tolerating 2.5mg/weekly dose of Mounjaro. Will increase to 5mg/weekly. Patient requesting written script today, printed off and given to him per request. Due for A1c at next lab visit.  - Tirzepatide 5 MG/0.5ML SOAJ; Inject 0.5 mLs (5 mg) subcutaneously every 7 days.  - FOOT EXAM    Class 3 severe obesity due to excess calories with serious comorbidity and body mass index (BMI) of 40.0 to 44.9 in adult (H)  Tolerating 2.5mg/weekly dose of Mounjaro. Will increase to 5mg/weekly. Patient requesting written script today, printed off and given to him per request.  - Tirzepatide 5 MG/0.5ML SOAJ; Inject 0.5 mLs (5 mg) subcutaneously every 7 days.    Encounter for immunization  Flu and COVID shots today.  - COVID-19 12+ (PFIZER)    Signed Electronically by:  Ranjan Bustamante MD, MPH  [facility]  Internal Medicine    The longitudinal plan of care for the diagnosis(es)/condition(s) as documented were addressed during this visit. Due to the added complexity in care, I will continue to support Brayden in the subsequent management and with ongoing continuity of care.    Subjective   Brayden is a 74 year old who presents for follow-up. He was hospitalized 3 weeks ago. Has followed up with cardiology since then, I reviewed their note today. He is hoping to discuss increasing Mounjaro dose today. Requesting written script for him to send into a different pharmacy. Tolerating medication well.      Objective    BP (!) 147/85   Pulse 65   Temp 97.3  F (36.3  C) (Temporal)   Ht 1.727 m (5' 8\")   Wt 128.4 kg (283 lb 1.6 oz)   SpO2 95%   BMI 43.05 kg/m  "   Body mass index is 43.05 kg/m .    Physical Exam   GENERAL: alert and in no distress.  EYES: conjunctivae/corneas clear. EOMs grossly intact  HENT: Facies symmetric.  RESP: No iWOB  MSK: Moves all four extremities freely.  FOOT EXAM: No ulcers/sores. DP pulse 1+. Sensation to monofilament intact.  SKIN: No significant ulcers, lesions, or rashes on the visualized portions of the skin  NEURO: CN II-XII grossly intact.

## 2025-01-06 NOTE — TELEPHONE ENCOUNTER
Crystal responded to pt on 12/26 via "[employer] miley has OV on 1/10 to discuss further  [facility] pm

## 2025-01-11 ENCOUNTER — MYC MEDICAL ADVICE (OUTPATIENT)
Dept: CARDIOLOGY | Facility: CLINIC | Age: 75
End: 2025-01-11
Payer: COMMERCIAL

## 2025-01-11 DIAGNOSIS — I48.19 PERSISTENT ATRIAL FIBRILLATION (H): Primary | ICD-10-CM

## 2025-01-13 NOTE — TELEPHONE ENCOUNTER
Ordered AVN ablation and L-bundle Medtronic PPM with Dr. Day  Staff Msg sent to Melanie to call to arrange    Will need to HOLD tirzepatide x 1 week prior to procedure  Will need to hold Bumex and potassium and Vit D3 day of procedure     Will likely stay overnight given AVN ablation/PPM dependent    January 13, 2025 at 9:09 AM

## 2025-01-16 ENCOUNTER — TELEPHONE (OUTPATIENT)
Dept: CARDIOLOGY | Facility: CLINIC | Age: 75
End: 2025-01-16
Payer: COMMERCIAL

## 2025-01-16 NOTE — TELEPHONE ENCOUNTER
Spoke to pt and made aware that [facility] paperwork is filled out and has been faxed to [facility]- [phone]. Copy also mailed to pt as requested. Miguel Ángel

## 2025-01-19 ENCOUNTER — MYC REFILL (OUTPATIENT)
Dept: CARDIOLOGY | Facility: CLINIC | Age: 75
End: 2025-01-19
Payer: COMMERCIAL

## 2025-01-19 DIAGNOSIS — I50.9 ACUTE ON CHRONIC CONGESTIVE HEART FAILURE, UNSPECIFIED HEART FAILURE TYPE (H): ICD-10-CM

## 2025-01-20 RX ORDER — BUMETANIDE 1 MG/1
TABLET ORAL
Qty: 360 TABLET | Refills: 1 | Status: SHIPPED | OUTPATIENT
Start: 2025-01-20

## 2025-01-23 ENCOUNTER — MYC REFILL (OUTPATIENT)
Dept: INTERNAL MEDICINE | Facility: CLINIC | Age: 75
End: 2025-01-23
Payer: COMMERCIAL

## 2025-01-23 DIAGNOSIS — I50.9 ACUTE DECOMPENSATED HEART FAILURE (H): ICD-10-CM

## 2025-01-23 RX ORDER — ATORVASTATIN CALCIUM 40 MG/1
20 TABLET, FILM COATED ORAL EVERY EVENING
Qty: 45 TABLET | Refills: 3 | OUTPATIENT
Start: 2025-01-23

## 2025-01-24 ENCOUNTER — HOSPITAL ENCOUNTER (OUTPATIENT)
Dept: CARDIOLOGY | Facility: CLINIC | Age: 75
Discharge: HOME | End: 2025-01-24
Attending: INTERNAL MEDICINE | Admitting: INTERNAL MEDICINE
Payer: COMMERCIAL

## 2025-01-24 DIAGNOSIS — I35.0 SEVERE AORTIC STENOSIS: ICD-10-CM

## 2025-01-24 LAB — LVEF ECHO: NORMAL

## 2025-01-24 PROCEDURE — 255N000002 HC RX 255 OP 636: Performed by: INTERNAL MEDICINE

## 2025-01-24 PROCEDURE — C8929 TTE W OR WO FOL WCON,DOPPLER: HCPCS

## 2025-01-24 PROCEDURE — 999N000208 ECHOCARDIOGRAM COMPLETE

## 2025-01-24 PROCEDURE — 93306 TTE W/DOPPLER COMPLETE: CPT | Mod: 26 | Performed by: INTERNAL MEDICINE

## 2025-01-24 RX ADMIN — HUMAN ALBUMIN MICROSPHERES AND PERFLUTREN 3 ML: 10; .22 INJECTION, SOLUTION INTRAVENOUS at 11:06

## 2025-01-27 PROCEDURE — 93228 REMOTE 30 DAY ECG REV/REPORT: CPT | Performed by: INTERNAL MEDICINE

## 2025-02-03 ENCOUNTER — LAB (OUTPATIENT)
Dept: LAB | Facility: CLINIC | Age: 75
End: 2025-02-03
Payer: COMMERCIAL

## 2025-02-03 DIAGNOSIS — N17.9 AKI (ACUTE KIDNEY INJURY): ICD-10-CM

## 2025-02-03 DIAGNOSIS — T38.0X5A STEROID-INDUCED HYPERGLYCEMIA: ICD-10-CM

## 2025-02-03 DIAGNOSIS — R73.9 STEROID-INDUCED HYPERGLYCEMIA: ICD-10-CM

## 2025-02-03 LAB
ANION GAP SERPL CALCULATED.3IONS-SCNC: 12 MMOL/L (ref 7–15)
BUN SERPL-MCNC: 15.2 MG/DL (ref 8–23)
CALCIUM SERPL-MCNC: 9.2 MG/DL (ref 8.8–10.4)
CHLORIDE SERPL-SCNC: 103 MMOL/L (ref 98–107)
CREAT SERPL-MCNC: 1.11 MG/DL (ref 0.67–1.17)
EGFRCR SERPLBLD CKD-EPI 2021: 70 ML/MIN/1.73M2
EST. AVERAGE GLUCOSE BLD GHB EST-MCNC: 117 MG/DL
FASTING STATUS PATIENT QL REPORTED: NO
FASTING STATUS PATIENT QL REPORTED: NO
GLUCOSE SERPL-MCNC: 105 MG/DL (ref 70–99)
GLUCOSE SERPL-MCNC: 105 MG/DL (ref 70–99)
HBA1C MFR BLD: 5.7 % (ref 0–5.6)
HCO3 SERPL-SCNC: 25 MMOL/L (ref 22–29)
POTASSIUM SERPL-SCNC: 4.3 MMOL/L (ref 3.4–5.3)
SODIUM SERPL-SCNC: 140 MMOL/L (ref 135–145)

## 2025-02-03 PROCEDURE — 80048 BASIC METABOLIC PNL TOTAL CA: CPT

## 2025-02-03 PROCEDURE — 83036 HEMOGLOBIN GLYCOSYLATED A1C: CPT

## 2025-02-03 NOTE — PROGRESS NOTES
Brayden is a 74 year old who is being evaluated via a billable video visit.    How would you like to obtain your AVS? MyChart  If the video visit is dropped, the invitation should be resent by: Send to e-mail at: [email or web address]  Will anyone else be joining your video visit? No      Video-Visit Details    Type of service:  Video Visit   Video End Time: 1245  Originating Location (pt. Location): Home    Distant Location (provider location):  On-site  Platform used for Video Visit: Jeremy    CC:  AFib     BRIEF HPI:  Brayden is a 74 year old yo man who sees Dr. Day and Dr. Muse for h/o:    1. H/o persistent AFib - see extensive hx in 1/10/2025 note. First dx'd in 2012. Has undergone 3 ablations, most recently 5/2024. AAD therapy limited to scar on MRI, long QT on sotalol, and concern for amiodarone-induced pneumonitis (stopped 11/2023). Back in AFib ~12/12/2024, shortly after TAVR procedure.  2. Aortic Stenosis  - s/p 34mm Medtronic Evolut FX TAVR 12/10/2024  3. Benign Essential HTN   4. BOWEN  - on CPAP  5. CAD - Cath 11/2024 with 75% D2 lesion noted. RAMIRO at time of TAVR showed EF 45% with WMA and updated angiogram done during procedure showing 95% dLAD in addition to the 75% D2 lesions,. Follow-up echo 12/2024 showed new WMA and EF 45-50% and LBBB. Rec'd to treat medically given small, distal vessel  6. H/o Alcohol dependence - has limited use  7. Chronic AC  On Xarelto for CHADSVASc 6 (HFpEF, HTN, age, CVA, aortic plaquing)  8. Bell's Palsy  9. Lung nodules- noted on CTA to follow-up on aorta 8/2023.  Last CT 3/2024 showed improvement but not complete resolution of pulmonary nodules on CT.  Sees Dr. Mckeon  10. HFpEF - hospitalized 2/2024 and following ablation 5/2024 for recurrent HFpEF in setting of persistent AF, prednisone use, moderate aortic stenosis.  Hospitalized again 7/2024 thought due to systemic prednisone use/recurrent AF/AoS  11. LBBB - noted at time of TAVR  12. CVA - slurred  "speech/facial droop 9/2024. Brain MRI with acute/subacute infarct in posterior R frontal lobe despite Xarelto therapy. Saw Neuro - no Watchman recommended given had other sources of potential of CVA beyond embolism, considered adding ASA or switching Xarelto to warfarin but no formal recommendation given.    LAST VISIT & INTERVAL HISTORY:  I saw Brayden 1/10 at which time he continued to note awareness of AFib only with DELEON. No c/o palpitations, dizziness,lightheadedness.  Brayden Alaniz and I had a long discussion about our limited options for AFib and rationale for Dr. Day suggesting AVN ablation and conduction-system pacer. Given HR was a little higher than his normal, I started Metoprolol XL 25 mg daily and arranged for close follow-up before his AVN ablation/L-bundle PPM could be done. This has been set up for 2/12/2025    He subsequently stopped his Metoprolol XL 25 mg daily d/t increase DELEON.     He saw Gloria for TAVR follow-up 1/24 and he'd been going to [employer] daily, hoping to lose weight with Tirzepatide and exercise and diet changes. Echo showed nl LVEF with nl valves.     TODAY'S VISIT:  Brayden is \"doing OK.\" Continues to work out routinely at [employer] and does a circuit despite significant persistent DELEON. No c/o CP.     Weight is stable (280s) and no edema. No orthopnea, PND. Doesn't think he's retaining fluid.      DIAGNOSTICS:  EKG 1/24/2025 AFib 85 bpm. LBBB  Echo 1/24/2025 LVEF 55-60%. Nl RV. Mod LAE. 1+MR, mean gradient nomral 6.1 mmHg.   EKG 12/19/2024, which I overread, showed AFib with significant artifact. LBBB. HR 93 bpm on no AVN blocking agents  Echocardiogram 12/12/2024 - LVEF 45% with distal septal/apical HK. Severe AoS. Now s/p TAVR  Angiogram 12/10/2024 - dLAD 95%, D2 75%   Echo 9/2024  LVEF 55-60% wit no RWMA. Nl RV. Trace MR. Trace T$. Severe AoS. Nl aortic root. Asc aorta mildly enlraged 4.1 mc  Echo 5/6/2024 LVEF 55-60%.  Mild LVH.  Moderate aortic stenosis with mean " "gradient 35 mmHg.  G3 DD  Nuclear stress test 5/8/2024 small area of mild ischemia in apical segments, with small area of nontransmural infarct in basal inferolateral region with mild degree of iva-infarct ischemia in basal/mid inferior wall.  Thought this was possibly due to body habitus  ZioPatch 11/24-25/2023 (stopped early d/t hospitalization) NSR 71 bpm. Occasional ectopy. No AFIb seen.  Cardiac MRI 10/2017 showed an EF of 51% with mild hypokinesis involving the basa inferolateral segment    REVIEW OF SYSTEMS:  Negative with the exception of that noted above    PHYSICAL EXAM:  /81   Pulse 71   Wt 128.8 kg (284 lb)   BMI 43.18 kg/m    Body mass index is 43.18 kg/m .    Physical Exam  GENERAL: alert and no distress  EYES: Eyes grossly normal to inspection.  No discharge or erythema, or obvious scleral/conjunctival abnormalities.  RESP: No audible wheeze, cough, or visible cyanosis.    SKIN: Visible skin clear. No significant rash, abnormal pigmentation or lesions.  NEURO: Cranial nerves grossly intact.  Mentation and speech appropriate for age.  PSYCH: Appropriate affect, tone, and pace of words      PLAN:  AVn ablation and conduction-system pacer 2/12/2025 as planned  Routine follow-up per Device  Routine follow-up with [facility] with imaging as previously arranged  Follow-up with Dr. Zuleta with fasting labs 11/2025    ASSESSMENT/PLAN:      Recurrent atrial fibrillation  Very long h/o this, as above. S/p 3 complex AFib ablations (11/2017, 4/2019, 5/2024).   Had considered hospitalization 7/2024 for dofetilide 7/2024 but admitted for HFpEF/volume overload and ended up being started on sotalol 120 mg q12h. D/t QT prolongation in SR, discharged on 80 mg q12h. Ultimately stopped 9/2024 during hospitalization for acute CVA - documentation indications mildly prolonged  ms and need for \"permissive BP\" with CVA  Maintained SR 9/2024 -- 12/11/2024 off of sotalol and believes he went into this after " discharge from TAVR. Wonders if it could have been d/t recent procedure/anesthesia, not using CPAP in hospital or 2 glasses of egg nog he drank when he got home (EtOH has been a trigger for him in the past)     Unfortunately, despite rate control typically develops severe DELEON/SOB and HFpEF exacerbation with this. When he saw Dr. Day 9/2024, recommended consideration of AVN ablation/PPM if had recurrence    Reviewed options for tx are limited:  Has undergone 3 ablations, most recently 5/2024  Attempts at DCCV without AAD therapy has previously not succeeded  No flecainide given scar on cMRI 10/2017. Cath 12/2024 with 95% dLAD lesion, 75% D2 lesion  No amiodarone given pneumonitis (though Pulm wonders if amiodarone truly the culprit given had flare of this despite being off amiodarone)  Was on sotalol 80 mg q12h with DCCV 7/2024, stopped after hospitalization 9/2024 for QT prolongation (reported 540 ms) and need for permissive BP in setting of CVA    Couldn't tolerate Metoprolol XL 25 mg daily d/t increase DELEON     EKG 1/2025 at [facility] still with LBBB (noted at time of TAVR)  Chronically on Xarelto. Had CVA despite chronic Xarelto use 9/2024. Neuro didn't think Watchman would offer much benefit given many other reasons for CVA other than embolism. Consideration of ASA or switch from Xarelto to warfarin made by Neurology but no formal recommendation given and has been deferred to Neurology based on Dr. Zuleta's note 11/2024.  CHADSVASc at least 6 (recent HFpEF exacerbation, HTN, age, CVA, aortic plaquing). Dose appropriate forCVrCl 78 ml/min (SCr 1.11)   Note Hgb drop after TAVR.  Restarted Xarelto 12/11/2024 (on discharge from hospital)        PLAN:  Continue Xarelto therapy. Restarted 12/11/2024  Avoid EtOH  Continue CPAP use  AVN ablation and conduction system PPM 2/12/2025 with Dr. Day.  We discussed the risks, benefits and indications of Atrioventricular Node ablation and L-bundle pacemaker implantation,  including but not limited to use of conscious sedation including need for a , discomfort, peripheral vessel injury, pneumothorax, cardiac puncture and/or tamponade, device malfunction and/or recall, and infection requiring explantation of the device and long term antibiotics. Reviewed he'd be pacemaker dependent We also briefly discussed follow up expectations and restrictions following the procedure. The patient voiced understanding and is willing to proceed.  A consent form will be signed by the procedural physician.   Arrive 10 AM on 2/12/2025  No solid food after 2 AM, then OK for clear liquids only until 8 AM. Nothing to eat/drink from 8 AM until after procedure.  HOLD tirzepatide x 1 week (last dose before 2/5) -  skip this Friday's dose  Shower AM of procedure and wear a clean shirt  Hold all supplements/vitamins/ Bumex AM of procedure  Likely will require overnight stay as will be PPM dependent - will require a   Brayden is LEFT handed, so understands that device may be placed on R depending on Dr. Day's preference     Follow-up will be arranged by Device Team.            CAD  Back in 2017 had cMRI showing scar c/w Cx infarct  Angiogram 11/2024 showed 75% D2 lesion. Cath 12/2024 done at time of TAVR as RAMIRO showed new WMA confirmed D2 lesion 75% but also a dLAD lesion of 95%. Treated medically d/t small size in distal vessel  Reviewed echo 9/2024 showed LVEF 55-60%. Following TAVR, new LBBB with drop in LVEF with distal septal/apical HK 12/11/2024. Most recent echo 1/2025 with normalization of LVEF    Remains on ARB, statin (higher doses caused ?DELEON). No ASA d/t Xarelto therapy. Metoprolol XL 25 mg stopped d/t increased DELEON  Denies CP. Has significant DELEON with recurrent AFib 12/11/2024     PLAN:  Follow-up with Dr. Zuleta/team with updated lipids     Aortic Stenosis  S/p 34 mm Medtronic Evolut RX TAVR valve 12/10/2024  Follow-up echo 1/2024 with normalization of LVEF     PLAN:  Lifelong SBE  prophylaxis  Continue routine follow-up with TAVR team with imaging         4. Pulmonary Pneumonitis. Thought possible AFOP (acute fibrinous and organizing pneumonia)  Discovered 11/2023, thought d/t amiodarone use but thought less likely to be d/t amiodarone d/t recurrence 5/2024 despite stopping amiodarone 11/2023.   Presented again 5/5/2024 with SOB and had bilateral infiltrates, treated again with 3 m steroid taper   Off steroids 9/2024 and CT Chest 10/2024 with significant improvement in lung infiltrates  Confirms no Agent Orange exposure  Saw Dr. Valles 10/31/2024 and recommended he restart low dose prednisone prior to TAVR procedure given it could worsen disease.      PLAN:  Pulm follow-up planned ~2/2025    CURRENT MEDICATIONS:  Current Outpatient Medications   Medication Sig Dispense Refill    allopurinol (ZYLOPRIM) 300 MG tablet TAKE ONE TABLET (300 MG) BY MOUTH ONE TIME DAILY 90 tablet 2    atorvastatin (LIPITOR) 40 MG tablet Take 0.5 tablets (20 mg) by mouth every evening. 45 tablet 3    bumetanide (BUMEX) 1 MG tablet 2 mg am and 2 mg pm 360 tablet 1    cetirizine (ZYRTEC) 10 MG tablet Take 10 mg by mouth every evening      finasteride (PROPECIA) 1 MG tablet Take 1 tablet (1 mg) by mouth daily 90 tablet 2    ibuprofen (ADVIL/MOTRIN) 200 MG tablet Take 400 mg by mouth every 4 hours as needed for pain ([id number]).      losartan (COZAAR) 25 MG tablet Take 1 tablet (25 mg) by mouth daily. 45 tablet 0    potassium chloride pushpa ER (KLOR-CON M20) 20 MEQ CR tablet Take 3 tablets (60 mEq) by mouth 2 times daily. 180 tablet 3    predniSONE (DELTASONE) 5 MG tablet Take 10 mg by mouth daily.      rivaroxaban ANTICOAGULANT (XARELTO ANTICOAGULANT) 20 MG TABS tablet Take 1 tablet (20 mg) by mouth daily (with dinner). 90 tablet 3    Tirzepatide 5 MG/0.5ML SOAJ Inject 0.5 mLs (5 mg) subcutaneously every 7 days. 6 mL 1    Vitamin D3 (CHOLECALCIFEROL) 125 MCG (5000 UT) tablet Take 125 mcg by mouth daily.            ORDERS PLACED:  Orders Placed This Encounter   Procedures    Lipid Profile    ALT    Follow-Up with Cardiology     No orders of the defined types were placed in this encounter.    There are no discontinued medications.      Encounter Diagnoses   Name Primary?    Persistent atrial fibrillation (H)     Coronary artery disease involving native coronary artery of native heart without angina pectoris Yes         ALLERGIES     Allergies   Allergen Reactions    Amiodarone Shortness Of Breath     Suspected amiodarone toxicity involving lungs       PAST MEDICAL HISTORY:  Past Medical History:   Diagnosis Date    (HFpEF) heart failure with preserved ejection fraction (H)     Acute gouty arthritis     Alcohol use disorder in remission     Amiodarone pulmonary toxicity     Aortic stenosis     Basal cell carcinoma     Of the nose    Bell's palsy     Herpes simplex without mention of complication     Hyperlipidemia     Hypertension 2000    Persistent atrial fibrillation (H)     persistent, DCCV 12/2017, ablation 11/6/17    Pulmonary nodules     Sleep apnea     CPAP       PAST SURGICAL HISTORY:  Past Surgical History:   Procedure Laterality Date    ABLATION OF DYSRHYTHMIC FOCUS  2018, 04/12/2019    Procedure: EP Ablation Focal AFIB;  Surgeon: Fady Day MD;  Location:  HEART CARDIAC CATH LAB    ACHILLES TENDON SURGERY  1997    ANESTHESIA CARDIOVERSION N/A 2/26/2019    Procedure: ANESTHESIA CARDIOVERSION (CONNER);  Surgeon: GENERIC ANESTHESIA PROVIDER;  Location:  OR    ANESTHESIA CARDIOVERSION N/A 11/19/2020    Procedure: ANESTHESIA, FOR CARDIOVERSION (dr campbell);  Surgeon: GENERIC ANESTHESIA PROVIDER;  Location:  OR    ANESTHESIA CARDIOVERSION N/A 8/22/2022    Procedure: CARDIOVERSION;  Surgeon: GENERIC ANESTHESIA PROVIDER;  Location:  OR    ANESTHESIA CARDIOVERSION N/A 9/13/2023    Procedure: Anesthesia cardioversion;  Surgeon: GENERIC ANESTHESIA PROVIDER;  Location:  OR    ANESTHESIA CARDIOVERSION N/A  10/6/2023    Procedure: Anesthesia cardioversion;  Surgeon: GENERIC ANESTHESIA PROVIDER;  Location:  OR    ANESTHESIA CARDIOVERSION N/A 8/1/2024    Procedure: Anesthesia cardioversion;  Surgeon: GENERIC ANESTHESIA PROVIDER;  Location:  OR    BASAL CELL CARCINOMA EXCISION Right 2009    nose, took cartilage from inner ear.    CARDIOVERSION  2012, 2019    cardioversion for atr fib    COLONOSCOPY  2008    COLONOSCOPY N/A 3/22/2023    Procedure: COLONOSCOPY, FLEXIBLE, WITH LESION REMOVAL USING SNARE;  Surgeon: Anne Lucero MD;  Location:  GI    CV CORONARY ANGIOGRAM N/A 11/22/2024    Procedure: Coronary Angiogram;  Surgeon: Murphy Mccann MD;  Location:  HEART CARDIAC CATH LAB    CV CORONARY ANGIOGRAM N/A 12/10/2024    Procedure: Coronary Angiogram;  Surgeon: Ricky Muse MD;  Location:  HEART CARDIAC CATH LAB    CV RIGHT HEART CATH MEASUREMENTS RECORDED N/A 7/31/2024    Procedure: Right Heart Cath;  Surgeon: Adriano Lazo MD;  Location:  HEART CARDIAC CATH LAB    CV RIGHT HEART CATH MEASUREMENTS RECORDED N/A 11/22/2024    Procedure: Right Heart Catheterization;  Surgeon: Murphy Mccann MD;  Location:  HEART CARDIAC CATH LAB    CV TRANSCATHETER AORTIC VALVE REPLACEMENT-FEMORAL APPROACH N/A 12/10/2024    Procedure: Transcatheter Aortic Valve Replacement-Femoral Approach;  Surgeon: Ricky Muse MD;  Location:  HEART CARDIAC CATH LAB    EP ABLATION FOCAL AFIB N/A 4/12/2019    Procedure: EP Ablation Focal AFIB;  Surgeon: Fady Day MD;  Location:  HEART CARDIAC CATH LAB    EP ABLATION FOCAL AFIB N/A 5/2/2024    Procedure: Ablation Atrial Fibrilation;  Surgeon: Fady Day MD;  Location: [facility] CARDIAC CATH LAB    HERNIA REPAIR, UMBILICAL  08/20/2012    Procedure: HERNIORRHAPHY UMBILICAL;  UMBILICAL HERNIA REPAIR;  Surgeon: Ra Crocker MD;  Location:  SD    HERNIORRHAPHY UMBILICAL  8/20/2012    Procedure: HERNIORRHAPHY UMBILICAL;   UMBILICAL HERNIA REPAIR;  Surgeon: Ra Crocker MD;  Location: [facility]    ORTHOPEDIC SURGERY      ORTHOPEDIC SURGERY      OTHER SURGICAL HISTORY Right 2017    total KNEE ARTHROSCOPY     TOTAL KNEE ARTHROPLASTY Left 2018    ZZ NONSPECIFIC PROCEDURE      achilles tendon    ZZC NONSPECIFIC PROCEDURE      knees, arthroscopy    ZZC NONSPECIFIC PROCEDURE      basal cell skin ca, nose    ZZC NONSPECIFIC PROCEDURE      flex sig; colonoscopy due 3/2008    ZZC NONSPECIFIC PROCEDURE      cardioversion for atr fib    ZZC TOTAL KNEE ARTHROPLASTY Left 2018    Dr. Malick Suarez       FAMILY HISTORY:  Family History   Problem Relation Age of Onset    Family History Negative Mother     Heart Disease Father         decesased at 42 - MI    Heart Disease Sister          MI    Lipids Sister     Lipids Sister     No Known Problems Sister     Heart Disease Brother          MI    Heart Disease Brother         CABG AT 49    Lung Cancer No family hx of        SOCIAL HISTORY:  Social History     Socioeconomic History    Marital status: Single     Spouse name: None    Number of children: None    Years of education: None    Highest education level: None   Tobacco Use    Smoking status: Former     Current packs/day: 0.00     Average packs/day: 1 pack/day for 34.6 years (34.6 ttl pk-yrs)     Types: Cigarettes     Start date:      Quit date: 2011     Years since quittin.4     Passive exposure: Past    Smokeless tobacco: Never    Tobacco comments:     quit    Vaping Use    Vaping status: Never Used   Substance and Sexual Activity    Alcohol use: Not Currently    Drug use: Never    Sexual activity: Not Currently     Partners: Female     Birth control/protection: None   Social History Narrative    Worked in retail (-) and insurance sales     Spends some time on [place] on [place]     Social Drivers of Health     Financial Resource Strain: Low Risk  (12/10/2024)    Financial  Resource Strain     Within the past 12 months, have you or your family members you live with been unable to get utilities (heat, electricity) when it was really needed?: No   Food Insecurity: Low Risk  (12/10/2024)    Food Insecurity     Within the past 12 months, did you worry that your food would run out before you got money to buy more?: No     Within the past 12 months, did the food you bought just not last and you didn t have money to get more?: No   Transportation Needs: Low Risk  (12/10/2024)    Transportation Needs     Within the past 12 months, has lack of transportation kept you from medical appointments, getting your medicines, non-medical meetings or appointments, work, or from getting things that you need?: No   Interpersonal Safety: Low Risk  (12/10/2024)    Interpersonal Safety     Do you feel physically and emotionally safe where you currently live?: Yes     Within the past 12 months, have you been hit, slapped, kicked or otherwise physically hurt by someone?: No     Within the past 12 months, have you been humiliated or emotionally abused in other ways by your partner or ex-partner?: No   Housing Stability: Low Risk  (12/10/2024)    Housing Stability     Do you have housing? : Yes     Are you worried about losing your housing?: No       Video-Visit Details    Type of service:  Video Visit    Video Start Time: 1225  Video End Time:  1245  Duration: 20 minutes    Originating Location (pt. Location): Home    Distant Location (provider location):  [facility]     Platform used for Video Visit: HAROLDO MontielAS

## 2025-02-04 ENCOUNTER — VIRTUAL VISIT (OUTPATIENT)
Dept: CARDIOLOGY | Facility: CLINIC | Age: 75
End: 2025-02-04
Payer: COMMERCIAL

## 2025-02-04 VITALS
WEIGHT: 284 LBS | BODY MASS INDEX: 43.18 KG/M2 | SYSTOLIC BLOOD PRESSURE: 129 MMHG | HEART RATE: 71 BPM | DIASTOLIC BLOOD PRESSURE: 81 MMHG

## 2025-02-04 DIAGNOSIS — I25.10 CORONARY ARTERY DISEASE INVOLVING NATIVE CORONARY ARTERY OF NATIVE HEART WITHOUT ANGINA PECTORIS: Primary | ICD-10-CM

## 2025-02-04 DIAGNOSIS — I48.19 PERSISTENT ATRIAL FIBRILLATION (H): ICD-10-CM

## 2025-02-04 PROCEDURE — 98006 SYNCH AUDIO-VIDEO EST MOD 30: CPT | Performed by: PHYSICIAN ASSISTANT

## 2025-02-04 NOTE — PATIENT INSTRUCTIONS
Brayden - it was nice to speak with you today!    Today we reviewed:    You're working out and losing weight  Still terribly short of breath  Blood work yesterday looked good!  [facility] in 80s  Couldn't tolerate Metoprolol XL due to shortness of breath    MEDICATION CHANGES:    NONE    RECOMMENDATIONS:    Plan for AV Node ablation and pacemaker 2/12/2025 at [facility]  Arrive 10 AM on 2/12/2025  No solid food after 2 AM, then OK for clear liquids only until 8 AM. Nothing to eat/drink from 8 AM until after procedure.  HOLD tirzepatide x 1 week (last dose before 2/5) -  skip this Friday's dose  Shower AM of procedure and wear a clean shirt  Hold all supplements/vitamins/ Bumex AM of procedure  Likely will require overnight stay - will require a       FOLLOW UP:    Device Team will arrange follow-up   See Dr. Muse for TAVR Valve as planned  See Dr. Zuleta in ~11/2025 with blood work    IMPORTANT PHONE NUMBERS FOR  [facility] ([place]):  Arrhythmia nurses Mervat Hidalgo, & Caitlin: [phone]  Device nurses: [phone]

## 2025-02-04 NOTE — LETTER
2/4/2025    Ranjan Courtney MD  [address] [facility] [address]    RE: Mike Schultz       Dear Colleague,     I had the pleasure of seeing Mike Schultz in the [facility].  Brayden is a 74 year old who is being evaluated via a billable video visit.    How would you like to obtain your AVS? MyChart  If the video visit is dropped, the invitation should be resent by: Send to e-mail at: [email or web address]  Will anyone else be joining your video visit? No      Video-Visit Details    Type of service:  Video Visit   Video End Time: 1245  Originating Location (pt. Location): Home    Distant Location (provider location):  On-site  Platform used for Video Visit: EsvinMax-Viz    CC:  AFib     BRIEF HPI:  Brayden is a 74 year old yo man who sees Dr. Day and Dr. Muse for h/o:    1. H/o persistent AFib - see extensive hx in 1/10/2025 note. First dx'd in 2012. Has undergone 3 ablations, most recently 5/2024. AAD therapy limited to scar on MRI, long QT on sotalol, and concern for amiodarone-induced pneumonitis (stopped 11/2023). Back in AFib ~12/12/2024, shortly after TAVR procedure.  2. Aortic Stenosis  - s/p 34mm Medtronic Evolut FX TAVR 12/10/2024  3. Benign Essential HTN   4. BOWEN  - on CPAP  5. CAD - Cath 11/2024 with 75% D2 lesion noted. RAMIRO at time of TAVR showed EF 45% with WMA and updated angiogram done during procedure showing 95% dLAD in addition to the 75% D2 lesions,. Follow-up echo 12/2024 showed new WMA and EF 45-50% and LBBB. Rec'd to treat medically given small, distal vessel  6. H/o Alcohol dependence - has limited use  7. Chronic AC  On Xarelto for CHADSVASc 6 (HFpEF, HTN, age, CVA, aortic plaquing)  8. Bell's Palsy  9. Lung nodules- noted on CTA to follow-up on aorta 8/2023.  Last CT 3/2024 showed improvement but not complete resolution of pulmonary nodules on CT.  Sees Dr. Mckeon  10. HFpEF - hospitalized 2/2024 and following ablation 5/2024 for recurrent HFpEF in setting  "of persistent AF, prednisone use, moderate aortic stenosis.  Hospitalized again 7/2024 thought due to systemic prednisone use/recurrent AF/AoS  11. LBBB - noted at time of TAVR  12. CVA - slurred speech/facial droop 9/2024. Brain MRI with acute/subacute infarct in posterior R frontal lobe despite Xarelto therapy. Saw Neuro - no Watchman recommended given had other sources of potential of CVA beyond embolism, considered adding ASA or switching Xarelto to warfarin but no formal recommendation given.    LAST VISIT & INTERVAL HISTORY:  I saw Brayden 1/10 at which time he continued to note awareness of AFib only with DELEON. No c/o palpitations, dizziness,lightheadedness.  Brayden Alaniz and I had a long discussion about our limited options for AFib and rationale for Dr. Day suggesting AVN ablation and conduction-system pacer. Given HR was a little higher than his normal, I started Metoprolol XL 25 mg daily and arranged for close follow-up before his AVN ablation/L-bundle PPM could be done. This has been set up for 2/12/2025    He subsequently stopped his Metoprolol XL 25 mg daily d/t increase DELEON.     He saw Gloria for TAVR follow-up 1/24 and he'd been going to [employer] daily, hoping to lose weight with Tirzepatide and exercise and diet changes. Echo showed nl LVEF with nl valves.     TODAY'S VISIT:  Brayden is \"doing OK.\" Continues to work out routinely at [employer] and does a circuit despite significant persistent DELEON. No c/o CP.     Weight is stable (280s) and no edema. No orthopnea, PND. Doesn't think he's retaining fluid.      DIAGNOSTICS:  EKG 1/24/2025 AFib 85 bpm. LBBB  Echo 1/24/2025 LVEF 55-60%. Nl RV. Mod LAE. 1+MR, mean gradient nomral 6.1 mmHg.   EKG 12/19/2024, which I overread, showed AFib with significant artifact. LBBB. HR 93 bpm on no AVN blocking agents  Echocardiogram 12/12/2024 - LVEF 45% with distal septal/apical HK. Severe AoS. Now s/p TAVR  Angiogram 12/10/2024 - dLAD 95%, D2 75%   Echo 9/2024  " LVEF 55-60% wit no RWMA. Nl RV. Trace MR. Trace T$. Severe AoS. Nl aortic root. Asc aorta mildly enlraged 4.1 mc  Echo 5/6/2024 LVEF 55-60%.  Mild LVH.  Moderate aortic stenosis with mean gradient 35 mmHg.  G3 DD  Nuclear stress test 5/8/2024 small area of mild ischemia in apical segments, with small area of nontransmural infarct in basal inferolateral region with mild degree of iva-infarct ischemia in basal/mid inferior wall.  Thought this was possibly due to body habitus  ZioPatch 11/24-25/2023 (stopped early d/t hospitalization) NSR 71 bpm. Occasional ectopy. No AFIb seen.  Cardiac MRI 10/2017 showed an EF of 51% with mild hypokinesis involving the basa inferolateral segment    REVIEW OF SYSTEMS:  Negative with the exception of that noted above    PHYSICAL EXAM:  /81   Pulse 71   Wt 128.8 kg (284 lb)   BMI 43.18 kg/m    Body mass index is 43.18 kg/m .    Physical Exam  GENERAL: alert and no distress  EYES: Eyes grossly normal to inspection.  No discharge or erythema, or obvious scleral/conjunctival abnormalities.  RESP: No audible wheeze, cough, or visible cyanosis.    SKIN: Visible skin clear. No significant rash, abnormal pigmentation or lesions.  NEURO: Cranial nerves grossly intact.  Mentation and speech appropriate for age.  PSYCH: Appropriate affect, tone, and pace of words      PLAN:  AVn ablation and conduction-system pacer 2/12/2025 as planned  Routine follow-up per Device  Routine follow-up with [facility] with imaging as previously arranged  Follow-up with Dr. Zuleta with fasting labs 11/2025    ASSESSMENT/PLAN:      Recurrent atrial fibrillation  Very long h/o this, as above. S/p 3 complex AFib ablations (11/2017, 4/2019, 5/2024).   Had considered hospitalization 7/2024 for dofetilide 7/2024 but admitted for HFpEF/volume overload and ended up being started on sotalol 120 mg q12h. D/t QT prolongation in SR, discharged on 80 mg q12h. Ultimately stopped 9/2024 during hospitalization for  "acute CVA - documentation indications mildly prolonged  ms and need for \"permissive BP\" with CVA  Maintained SR 9/2024 -- 12/11/2024 off of sotalol and believes he went into this after discharge from TAVR. Wonders if it could have been d/t recent procedure/anesthesia, not using CPAP in hospital or 2 glasses of egg nog he drank when he got home (EtOH has been a trigger for him in the past)     Unfortunately, despite rate control typically develops severe DELEON/SOB and HFpEF exacerbation with this. When he saw Dr. Day 9/2024, recommended consideration of AVN ablation/PPM if had recurrence    Reviewed options for tx are limited:  Has undergone 3 ablations, most recently 5/2024  Attempts at DCCV without AAD therapy has previously not succeeded  No flecainide given scar on cMRI 10/2017. Cath 12/2024 with 95% dLAD lesion, 75% D2 lesion  No amiodarone given pneumonitis (though Pulm wonders if amiodarone truly the culprit given had flare of this despite being off amiodarone)  Was on sotalol 80 mg q12h with DCCV 7/2024, stopped after hospitalization 9/2024 for QT prolongation (reported 540 ms) and need for permissive BP in setting of CVA    Couldn't tolerate Metoprolol XL 25 mg daily d/t increase DELEON     EKG 1/2025 at [facility] still with LBBB (noted at time of TAVR)  Chronically on Xarelto. Had CVA despite chronic Xarelto use 9/2024. Neuro didn't think Watchman would offer much benefit given many other reasons for CVA other than embolism. Consideration of ASA or switch from Xarelto to warfarin made by Neurology but no formal recommendation given and has been deferred to Neurology based on Dr. Zuleta's note 11/2024.  CHADSVASc at least 6 (recent HFpEF exacerbation, HTN, age, CVA, aortic plaquing). Dose appropriate forCVrCl 78 ml/min (SCr 1.11)   Note Hgb drop after TAVR.  Restarted Xarelto 12/11/2024 (on discharge from hospital)        PLAN:  Continue Xarelto therapy. Restarted 12/11/2024  Avoid EtOH  Continue CPAP " use  AVN ablation and conduction system PPM 2/12/2025 with Dr. Day.  We discussed the risks, benefits and indications of Atrioventricular Node ablation and L-bundle pacemaker implantation, including but not limited to use of conscious sedation including need for a , discomfort, peripheral vessel injury, pneumothorax, cardiac puncture and/or tamponade, device malfunction and/or recall, and infection requiring explantation of the device and long term antibiotics. Reviewed he'd be pacemaker dependent We also briefly discussed follow up expectations and restrictions following the procedure. The patient voiced understanding and is willing to proceed.  A consent form will be signed by the procedural physician.   Arrive 10 AM on 2/12/2025  No solid food after 2 AM, then OK for clear liquids only until 8 AM. Nothing to eat/drink from 8 AM until after procedure.  HOLD tirzepatide x 1 week (last dose before 2/5) -  skip this Friday's dose  Shower AM of procedure and wear a clean shirt  Hold all supplements/vitamins/ Bumex AM of procedure  Likely will require overnight stay as will be PPM dependent - will require a   Brayden is LEFT handed, so understands that device may be placed on R depending on Dr. Day's preference     Follow-up will be arranged by Device Team.            CAD  Back in 2017 had cMRI showing scar c/w Cx infarct  Angiogram 11/2024 showed 75% D2 lesion. Cath 12/2024 done at time of TAVR as RAMIRO showed new WMA confirmed D2 lesion 75% but also a dLAD lesion of 95%. Treated medically d/t small size in distal vessel  Reviewed echo 9/2024 showed LVEF 55-60%. Following TAVR, new LBBB with drop in LVEF with distal septal/apical HK 12/11/2024. Most recent echo 1/2025 with normalization of LVEF    Remains on ARB, statin (higher doses caused ?DELEON). No ASA d/t Xarelto therapy. Metoprolol XL 25 mg stopped d/t increased DELEON  Denies CP. Has significant DELEON with recurrent AFib 12/11/2024     PLAN:  Follow-up with  Dr. Zuleta/team with updated lipids     Aortic Stenosis  S/p 34 mm Medtronic Evolut RX TAVR valve 12/10/2024  Follow-up echo 1/2024 with normalization of LVEF     PLAN:  Lifelong SBE prophylaxis  Continue routine follow-up with TAVR team with imaging         4. Pulmonary Pneumonitis. Thought possible AFOP (acute fibrinous and organizing pneumonia)  Discovered 11/2023, thought d/t amiodarone use but thought less likely to be d/t amiodarone d/t recurrence 5/2024 despite stopping amiodarone 11/2023.   Presented again 5/5/2024 with SOB and had bilateral infiltrates, treated again with 3 m steroid taper   Off steroids 9/2024 and CT Chest 10/2024 with significant improvement in lung infiltrates  Confirms no Agent Orange exposure  Saw Dr. Valles 10/31/2024 and recommended he restart low dose prednisone prior to TAVR procedure given it could worsen disease.      PLAN:  Pulm follow-up planned ~2/2025    CURRENT MEDICATIONS:  Current Outpatient Medications   Medication Sig Dispense Refill     allopurinol (ZYLOPRIM) 300 MG tablet TAKE ONE TABLET (300 MG) BY MOUTH ONE TIME DAILY 90 tablet 2     atorvastatin (LIPITOR) 40 MG tablet Take 0.5 tablets (20 mg) by mouth every evening. 45 tablet 3     bumetanide (BUMEX) 1 MG tablet 2 mg am and 2 mg pm 360 tablet 1     cetirizine (ZYRTEC) 10 MG tablet Take 10 mg by mouth every evening       finasteride (PROPECIA) 1 MG tablet Take 1 tablet (1 mg) by mouth daily 90 tablet 2     ibuprofen (ADVIL/MOTRIN) 200 MG tablet Take 400 mg by mouth every 4 hours as needed for pain ([id number]).       losartan (COZAAR) 25 MG tablet Take 1 tablet (25 mg) by mouth daily. 45 tablet 0     potassium chloride pushpa ER (KLOR-CON M20) 20 MEQ CR tablet Take 3 tablets (60 mEq) by mouth 2 times daily. 180 tablet 3     predniSONE (DELTASONE) 5 MG tablet Take 10 mg by mouth daily.       rivaroxaban ANTICOAGULANT (XARELTO ANTICOAGULANT) 20 MG TABS tablet Take 1 tablet (20 mg) by mouth daily (with dinner). 90 tablet  3     Tirzepatide 5 MG/0.5ML SOAJ Inject 0.5 mLs (5 mg) subcutaneously every 7 days. 6 mL 1     Vitamin D3 (CHOLECALCIFEROL) 125 MCG (5000 UT) tablet Take 125 mcg by mouth daily.           ORDERS PLACED:  Orders Placed This Encounter   Procedures     Lipid Profile     ALT     Follow-Up with Cardiology     No orders of the defined types were placed in this encounter.    There are no discontinued medications.      Encounter Diagnoses   Name Primary?     Persistent atrial fibrillation (H)      Coronary artery disease involving native coronary artery of native heart without angina pectoris Yes         ALLERGIES     Allergies   Allergen Reactions     Amiodarone Shortness Of Breath     Suspected amiodarone toxicity involving lungs       PAST MEDICAL HISTORY:  Past Medical History:   Diagnosis Date     (HFpEF) heart failure with preserved ejection fraction (H)      Acute gouty arthritis      Alcohol use disorder in remission      Amiodarone pulmonary toxicity      Aortic stenosis      Basal cell carcinoma     Of the nose     Bell's palsy      Herpes simplex without mention of complication      Hyperlipidemia      Hypertension 2000     Persistent atrial fibrillation (H)     persistent, DCCV 12/2017, ablation 11/6/17     Pulmonary nodules      Sleep apnea     CPAP       PAST SURGICAL HISTORY:  Past Surgical History:   Procedure Laterality Date     ABLATION OF DYSRHYTHMIC FOCUS  2018, 04/12/2019    Procedure: EP Ablation Focal AFIB;  Surgeon: Fady Day MD;  Location:  [facility]     ACHILLES TENDON SURGERY  1997     ANESTHESIA CARDIOVERSION N/A 2/26/2019    Procedure: ANESTHESIA CARDIOVERSION (CONNER);  Surgeon: GENERIC ANESTHESIA PROVIDER;  Location:  OR     ANESTHESIA CARDIOVERSION N/A 11/19/2020    Procedure: ANESTHESIA, FOR CARDIOVERSION (dr campbell);  Surgeon: GENERIC ANESTHESIA PROVIDER;  Location:  OR     ANESTHESIA CARDIOVERSION N/A 8/22/2022    Procedure: CARDIOVERSION;  Surgeon: GENERIC  ANESTHESIA PROVIDER;  Location:  OR     ANESTHESIA CARDIOVERSION N/A 9/13/2023    Procedure: Anesthesia cardioversion;  Surgeon: GENERIC ANESTHESIA PROVIDER;  Location:  OR     ANESTHESIA CARDIOVERSION N/A 10/6/2023    Procedure: Anesthesia cardioversion;  Surgeon: GENERIC ANESTHESIA PROVIDER;  Location:  OR     ANESTHESIA CARDIOVERSION N/A 8/1/2024    Procedure: Anesthesia cardioversion;  Surgeon: GENERIC ANESTHESIA PROVIDER;  Location:  OR     BASAL CELL CARCINOMA EXCISION Right 2009    nose, took cartilage from inner ear.     CARDIOVERSION  2012, 2019    cardioversion for atr fib     COLONOSCOPY  2008     COLONOSCOPY N/A 3/22/2023    Procedure: COLONOSCOPY, FLEXIBLE, WITH LESION REMOVAL USING SNARE;  Surgeon: Anne Lucero MD;  Location:  GI     CV CORONARY ANGIOGRAM N/A 11/22/2024    Procedure: Coronary Angiogram;  Surgeon: Murphy Mccann MD;  Location:  HE[facility]ATH [facility]     CV CORONARY ANGIOGRAM N/A 12/10/2024    Procedure: Coronary Angiogram;  Surgeon: Ricky Muse MD;  Location:  [facility]ATH LAB     CV RIGHT HEART CATH MEASUREMENTS RECORDED N/A 7/31/2024    Procedure: Right Heart Cath;  Surgeon: Adriano Lazo MD;  Location:  [facility]ATH LAB     CV RIGHT HEART CATH MEASUREMENTS RECORDED N/A 11/22/2024    Procedure: Right Heart Catheterization;  Surgeon: Murphy Mccann MD;  Location:  [facility]     CV TRANSCATHETER AORTIC VALVE REPLACEMENT-FEMORAL APPROACH N/A 12/10/2024    Procedure: Transcatheter Aortic Valve Replacement-Femoral Approach;  Surgeon: Ricky Muse MD;  Location:  [facility]ATH LAB     EP ABLATION FOCAL AFIB N/A 4/12/2019    Procedure: EP Ablation Focal AFIB;  Surgeon: Fady Day MD;  Location:  [facility]ATH [facility]     EP ABLATION FOCAL AFIB N/A 5/2/2024    Procedure: Ablation Atrial Fibrilation;  Surgeon: Fady Day MD;  Location:  [facility]ATH [facility]     HERNIA REPAIR,  UMBILICAL  2012    Procedure: HERNIORRHAPHY UMBILICAL;  UMBILICAL HERNIA REPAIR;  Surgeon: Ra Crocker MD;  Location: [facility]     HERNIORRHAPHY UMBILICAL  2012    Procedure: HERNIORRHAPHY UMBILICAL;  UMBILICAL HERNIA REPAIR;  Surgeon: Ra Crocker MD;  Location: [facility]     ORTHOPEDIC SURGERY       ORTHOPEDIC SURGERY       OTHER SURGICAL HISTORY Right 2017    total KNEE ARTHROSCOPY      TOTAL KNEE ARTHROPLASTY Left 2018     Z NONSPECIFIC PROCEDURE      achilles tendon     Z NONSPECIFIC PROCEDURE      knees, arthroscopy     ZZ NONSPECIFIC PROCEDURE      basal cell skin ca, nose     ZZC NONSPECIFIC PROCEDURE      flex sig; colonoscopy due 3/2008     ZZ NONSPECIFIC PROCEDURE      cardioversion for atr fib     ZZC TOTAL KNEE ARTHROPLASTY Left 2018    Dr. Malick Suarez       FAMILY HISTORY:  Family History   Problem Relation Age of Onset     Family History Negative Mother      Heart Disease Father         decesased at 42 - MI     Heart Disease Sister          MI     Lipids Sister      Lipids Sister      No Known Problems Sister      Heart Disease Brother          MI     Heart Disease Brother         CABG AT 49     Lung Cancer No family hx of        SOCIAL HISTORY:  Social History     Socioeconomic History     Marital status: Single     Spouse name: None     Number of children: None     Years of education: None     Highest education level: None   Tobacco Use     Smoking status: Former     Current packs/day: 0.00     Average packs/day: 1 pack/day for 34.6 years (34.6 ttl pk-yrs)     Types: Cigarettes     Start date:      Quit date: 2011     Years since quittin.4     Passive exposure: Past     Smokeless tobacco: Never     Tobacco comments:     quit    Vaping Use     Vaping status: Never Used   Substance and Sexual Activity     Alcohol use: Not Currently     Drug use: Never     Sexual activity: Not Currently     Partners: Female     Birth  control/protection: None   Social History Narrative    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     Social Drivers of Health     Financial Resource Strain: Low Risk  (12/10/2024)    Financial Resource Strain      Within the past 12 months, have you or your family members you live with been unable to get utilities (heat, electricity) when it was really needed?: No   Food Insecurity: Low Risk  (12/10/2024)    Food Insecurity      Within the past 12 months, did you worry that your food would run out before you got money to buy more?: No      Within the past 12 months, did the food you bought just not last and you didn t have money to get more?: No   Transportation Needs: Low Risk  (12/10/2024)    Transportation Needs      Within the past 12 months, has lack of transportation kept you from medical appointments, getting your medicines, non-medical meetings or appointments, work, or from getting things that you need?: No   Interpersonal Safety: Low Risk  (12/10/2024)    Interpersonal Safety      Do you feel physically and emotionally safe where you currently live?: Yes      Within the past 12 months, have you been hit, slapped, kicked or otherwise physically hurt by someone?: No      Within the past 12 months, have you been humiliated or emotionally abused in other ways by your partner or ex-partner?: No   Housing Stability: Low Risk  (12/10/2024)    Housing Stability      Do you have housing? : Yes      Are you worried about losing your housing?: No       Video-Visit Details    Type of service:  Video Visit    Video Start Time: 1225  Video End Time:  1245  Duration: 20 minutes    Originating Location (pt. Location): Home    Distant Location (provider location):  [facility]     Platform used for Video Visit: HAROLDO Montiel             Thank you for allowing me to participate in the care of your  patient.      Sincerely,     Natalie Campos PA-C     [facility]  cc:   Natalie Campos PA-C  [id number] [place] [id number]

## 2025-02-05 DIAGNOSIS — Z98.890 HX OF ATRIOVENTRICULAR NODE ABLATION: ICD-10-CM

## 2025-02-05 DIAGNOSIS — Z95.0 CARDIAC PACEMAKER IN SITU: ICD-10-CM

## 2025-02-05 DIAGNOSIS — I48.21 ATRIAL FIBRILLATION, PERMANENT (H): Primary | ICD-10-CM

## 2025-02-05 DIAGNOSIS — I50.9 ACUTE ON CHRONIC CONGESTIVE HEART FAILURE, UNSPECIFIED HEART FAILURE TYPE (H): ICD-10-CM

## 2025-02-05 RX ORDER — CEFAZOLIN SODIUM IN 0.9 % NACL 3 G/100 ML
3 INTRAVENOUS SOLUTION, PIGGYBACK (ML) INTRAVENOUS
OUTPATIENT
Start: 2025-02-05

## 2025-02-05 RX ORDER — LIDOCAINE 40 MG/G
CREAM TOPICAL
OUTPATIENT
Start: 2025-02-05

## 2025-02-05 RX ORDER — SODIUM CHLORIDE 450 MG/100ML
INJECTION, SOLUTION INTRAVENOUS CONTINUOUS
OUTPATIENT
Start: 2025-02-05

## 2025-02-05 NOTE — PROGRESS NOTES
Pre-procedure instructions for AVNA and PPM implant on 2/12/2025 at 12:00    -Review arrival time of 10:00 and location.     Nothing to eat starting 8 hours prior to arrival time (starting at 2 am)  May have clear liquids up until 2 hours prior to arrival time (up until 8 am)    Anticoagulation: Xarelto  Chronic AC On Xarelto for CHADSVASc 6 (HFpEF, HTN, age, CVA, aortic plaquing     DAPT (Plavix, Effient, Brilinta): no  Oral diabetes meds: no  Insulin: no  SGLT2 Inhibitors: no  - Invokana (canagliflozin), Farxiga (dapagliflozin), Jardiance (empagliflozin), Steglatro (ertugliflozin), Synjardy (empagliflozin/metformin)  - hold 3-4 days prior to procedure  GLP-1 Agonists: yes  - Byetta (exenitide), Victoza (liraglutide), Ozempic, Wegovy, Rybelsus (semaglutide), Trulicity (dulaglutide), Mounjaro (tirzepatide), Bydureon (Exenatide ER), Adlyxin (Lixisendatide)   - (Weekly dosing, hold GLP-1 agonists 7 days before procedure)  - (Daily or BID dosing, hold GLP-1 agonists day before and day of procedure)  - (Oral semaglutide, hold 7 days before procedure due to long half-life)  Diuretic: Bumex    -Shower the morning of the procedure, and then put on a clean shirt in order to help prevent infection.     -Pt to call [facility] at [phone] if pt has any new COVID like symptoms or if feeling unwell the evening prior or AM of procedure.      -Post-procedure transportation and 24 hours monitoring set up. Please call before coming in if plans change.  With limited bed availability due to COVID, overnight hospital stays will be allowed for clinical reasons only.    -No driving for 24 hours post procedure due to sedation.     Pt verbalized understanding of instructions.     PRABHA Dominguez      Info for pre-procedure orders:    MRSA history?: cultures negative but was treated for suspected   Allergy to PCN or ancef?: no. Reaction: no  Contrast allergy?: no  Labs needed?: no  EKG needed?: no    -INR check scheduled: na  (INR not  needed)

## 2025-02-05 NOTE — PROGRESS NOTES
Called and reviewed pre procedure instructions with pt.  Instructed pt to NOT hold his Xarelto per protocol    Alberto ARMANDO

## 2025-02-09 ENCOUNTER — MYC REFILL (OUTPATIENT)
Dept: INTERNAL MEDICINE | Facility: CLINIC | Age: 75
End: 2025-02-09
Payer: COMMERCIAL

## 2025-02-09 DIAGNOSIS — Z95.2 S/P TAVR (TRANSCATHETER AORTIC VALVE REPLACEMENT): ICD-10-CM

## 2025-02-09 DIAGNOSIS — I10 BENIGN ESSENTIAL HYPERTENSION: Primary | ICD-10-CM

## 2025-02-10 ENCOUNTER — MYC MEDICAL ADVICE (OUTPATIENT)
Dept: CARDIOLOGY | Facility: CLINIC | Age: 75
End: 2025-02-10
Payer: COMMERCIAL

## 2025-02-10 RX ORDER — LOSARTAN POTASSIUM 25 MG/1
25 TABLET ORAL DAILY
Qty: 90 TABLET | Refills: 3 | Status: SHIPPED | OUTPATIENT
Start: 2025-02-10

## 2025-02-10 NOTE — TELEPHONE ENCOUNTER
Pt sent [employer] message clarifying his Xarelto hold for procedure.       Per Dr. Day's staff message:          Message sent back to pt with this information.

## 2025-02-10 NOTE — TELEPHONE ENCOUNTER
Pt sent "[employer] message clarifying his Xarelto hold for procedure.         Per Dr. Day's staff message:             Message sent back to pt with this information.

## 2025-02-11 DIAGNOSIS — I10 BENIGN ESSENTIAL HYPERTENSION: ICD-10-CM

## 2025-02-11 DIAGNOSIS — E87.6 HYPOKALEMIA: ICD-10-CM

## 2025-02-11 RX ORDER — POTASSIUM CHLORIDE 1500 MG/1
TABLET, EXTENDED RELEASE ORAL
Qty: 180 TABLET | Refills: 2 | Status: SHIPPED | OUTPATIENT
Start: 2025-02-11

## 2025-02-12 ENCOUNTER — MYC MEDICAL ADVICE (OUTPATIENT)
Dept: CARDIOLOGY | Facility: CLINIC | Age: 75
End: 2025-02-12

## 2025-02-12 ENCOUNTER — HOSPITAL ENCOUNTER (OUTPATIENT)
Facility: CLINIC | Age: 75
Setting detail: OBSERVATION
Discharge: HOME | End: 2025-02-13
Attending: INTERNAL MEDICINE | Admitting: INTERNAL MEDICINE
Payer: COMMERCIAL

## 2025-02-12 DIAGNOSIS — I48.21 ATRIAL FIBRILLATION, PERMANENT (H): ICD-10-CM

## 2025-02-12 DIAGNOSIS — I48.19 PERSISTENT ATRIAL FIBRILLATION (H): ICD-10-CM

## 2025-02-12 DIAGNOSIS — Z98.890 HX OF ATRIOVENTRICULAR NODE ABLATION: ICD-10-CM

## 2025-02-12 DIAGNOSIS — Z95.0 CARDIAC PACEMAKER IN SITU: ICD-10-CM

## 2025-02-12 DIAGNOSIS — I50.9 ACUTE ON CHRONIC CONGESTIVE HEART FAILURE, UNSPECIFIED HEART FAILURE TYPE (H): ICD-10-CM

## 2025-02-12 LAB
ANION GAP SERPL CALCULATED.3IONS-SCNC: 13 MMOL/L (ref 7–15)
ATRIAL RATE - MUSE: 81 BPM
BUN SERPL-MCNC: 12 MG/DL (ref 8–23)
CALCIUM SERPL-MCNC: 9 MG/DL (ref 8.8–10.4)
CHLORIDE SERPL-SCNC: 99 MMOL/L (ref 98–107)
CREAT SERPL-MCNC: 1.06 MG/DL (ref 0.67–1.17)
DIASTOLIC BLOOD PRESSURE - MUSE: NORMAL MMHG
EGFRCR SERPLBLD CKD-EPI 2021: 74 ML/MIN/1.73M2
ERYTHROCYTE [DISTWIDTH] IN BLOOD BY AUTOMATED COUNT: 15.4 % (ref 10–15)
GLUCOSE SERPL-MCNC: 129 MG/DL (ref 70–99)
HCO3 SERPL-SCNC: 28 MMOL/L (ref 22–29)
HCT VFR BLD AUTO: 46.8 % (ref 40–53)
HGB BLD-MCNC: 15.6 G/DL (ref 13.3–17.7)
INTERPRETATION ECG - MUSE: NORMAL
MCH RBC QN AUTO: 31.6 PG (ref 26.5–33)
MCHC RBC AUTO-ENTMCNC: 33.3 G/DL (ref 31.5–36.5)
MCV RBC AUTO: 95 FL (ref 78–100)
P AXIS - MUSE: NORMAL DEGREES
PLATELET # BLD AUTO: 186 10E3/UL (ref 150–450)
POTASSIUM SERPL-SCNC: 4.3 MMOL/L (ref 3.4–5.3)
PR INTERVAL - MUSE: NORMAL MS
QRS DURATION - MUSE: 114 MS
QT - MUSE: 448 MS
QTC - MUSE: 545 MS
R AXIS - MUSE: -2 DEGREES
RBC # BLD AUTO: 4.94 10E6/UL (ref 4.4–5.9)
SODIUM SERPL-SCNC: 140 MMOL/L (ref 135–145)
SYSTOLIC BLOOD PRESSURE - MUSE: NORMAL MMHG
T AXIS - MUSE: 263 DEGREES
VENTRICULAR RATE- MUSE: 89 BPM
WBC # BLD AUTO: 8.3 10E3/UL (ref 4–11)

## 2025-02-12 PROCEDURE — 258N000002 HC RX IP 258 OP 250: Performed by: INTERNAL MEDICINE

## 2025-02-12 PROCEDURE — G0378 HOSPITAL OBSERVATION PER HR: HCPCS

## 2025-02-12 PROCEDURE — 250N000011 HC RX IP 250 OP 636: Performed by: INTERNAL MEDICINE

## 2025-02-12 PROCEDURE — 93005 ELECTROCARDIOGRAM TRACING: CPT

## 2025-02-12 PROCEDURE — 99152 MOD SED SAME PHYS/QHP 5/>YRS: CPT | Performed by: INTERNAL MEDICINE

## 2025-02-12 PROCEDURE — 250N000013 HC RX MED GY IP 250 OP 250 PS 637: Performed by: INTERNAL MEDICINE

## 2025-02-12 PROCEDURE — C1785 PMKR, DUAL, RATE-RESP: HCPCS | Performed by: INTERNAL MEDICINE

## 2025-02-12 PROCEDURE — 999N000071 HC STATISTIC HEART CATH LAB OR EP LAB

## 2025-02-12 PROCEDURE — C1887 CATHETER, GUIDING: HCPCS | Performed by: INTERNAL MEDICINE

## 2025-02-12 PROCEDURE — C1898 LEAD, PMKR, OTHER THAN TRANS: HCPCS | Performed by: INTERNAL MEDICINE

## 2025-02-12 PROCEDURE — 272N000001 HC OR GENERAL SUPPLY STERILE: Performed by: INTERNAL MEDICINE

## 2025-02-12 PROCEDURE — 99153 MOD SED SAME PHYS/QHP EA: CPT | Performed by: INTERNAL MEDICINE

## 2025-02-12 PROCEDURE — 250N000009 HC RX 250: Performed by: INTERNAL MEDICINE

## 2025-02-12 PROCEDURE — 33208 INSRT HEART PM ATRIAL & VENT: CPT | Mod: KX | Performed by: INTERNAL MEDICINE

## 2025-02-12 PROCEDURE — C1894 INTRO/SHEATH, NON-LASER: HCPCS | Performed by: INTERNAL MEDICINE

## 2025-02-12 PROCEDURE — 93650 ICAR CATH ABLTJ AV NODE FUNC: CPT | Performed by: INTERNAL MEDICINE

## 2025-02-12 PROCEDURE — 33208 INSRT HEART PM ATRIAL & VENT: CPT | Performed by: INTERNAL MEDICINE

## 2025-02-12 PROCEDURE — 80048 BASIC METABOLIC PNL TOTAL CA: CPT | Performed by: INTERNAL MEDICINE

## 2025-02-12 PROCEDURE — 85041 AUTOMATED RBC COUNT: CPT | Performed by: INTERNAL MEDICINE

## 2025-02-12 PROCEDURE — C1733 CATH, EP, OTHR THAN COOL-TIP: HCPCS | Performed by: INTERNAL MEDICINE

## 2025-02-12 PROCEDURE — 999N000184 HC STATISTIC TELEMETRY

## 2025-02-12 PROCEDURE — 36415 COLL VENOUS BLD VENIPUNCTURE: CPT | Performed by: INTERNAL MEDICINE

## 2025-02-12 DEVICE — LEAD PACEMAKER SELECTSECURE 69CM MD  383069: Type: IMPLANTABLE DEVICE | Status: FUNCTIONAL

## 2025-02-12 DEVICE — PACEMAKER AZURE MRI XT DR: Type: IMPLANTABLE DEVICE | Status: FUNCTIONAL

## 2025-02-12 DEVICE — IMP LEAD PACING BIPOLAR CAPSUREFIX NOVUS 45CM 5076-45: Type: IMPLANTABLE DEVICE | Status: FUNCTIONAL

## 2025-02-12 RX ORDER — FINASTERIDE 1 MG/1
1 TABLET, FILM COATED ORAL DAILY
Status: DISCONTINUED | OUTPATIENT
Start: 2025-02-13 | End: 2025-02-13 | Stop reason: HOSPADM

## 2025-02-12 RX ORDER — CEFAZOLIN SODIUM IN 0.9 % NACL 3 G/100 ML
3 INTRAVENOUS SOLUTION, PIGGYBACK (ML) INTRAVENOUS
Status: DISCONTINUED | OUTPATIENT
Start: 2025-02-12 | End: 2025-02-12 | Stop reason: HOSPADM

## 2025-02-12 RX ORDER — POTASSIUM CHLORIDE 1500 MG/1
60 TABLET, EXTENDED RELEASE ORAL 2 TIMES DAILY WITH MEALS
Status: DISCONTINUED | OUTPATIENT
Start: 2025-02-12 | End: 2025-02-13 | Stop reason: HOSPADM

## 2025-02-12 RX ORDER — ACETAMINOPHEN 325 MG/1
650 TABLET ORAL EVERY 4 HOURS PRN
Status: DISCONTINUED | OUTPATIENT
Start: 2025-02-12 | End: 2025-02-13 | Stop reason: HOSPADM

## 2025-02-12 RX ORDER — BUPIVACAINE HYDROCHLORIDE 2.5 MG/ML
INJECTION, SOLUTION EPIDURAL; INFILTRATION; INTRACAUDAL
Status: DISCONTINUED | OUTPATIENT
Start: 2025-02-12 | End: 2025-02-12 | Stop reason: HOSPADM

## 2025-02-12 RX ORDER — ATORVASTATIN CALCIUM 20 MG/1
20 TABLET, FILM COATED ORAL EVERY EVENING
Status: DISCONTINUED | OUTPATIENT
Start: 2025-02-12 | End: 2025-02-13 | Stop reason: HOSPADM

## 2025-02-12 RX ORDER — CETIRIZINE HYDROCHLORIDE 10 MG/1
10 TABLET ORAL EVERY EVENING
Status: DISCONTINUED | OUTPATIENT
Start: 2025-02-13 | End: 2025-02-13 | Stop reason: HOSPADM

## 2025-02-12 RX ORDER — BUMETANIDE 0.5 MG/1
0.5 TABLET ORAL DAILY
Status: DISCONTINUED | OUTPATIENT
Start: 2025-02-12 | End: 2025-02-12

## 2025-02-12 RX ORDER — POTASSIUM CHLORIDE 750 MG/1
10 TABLET, EXTENDED RELEASE ORAL DAILY
Status: DISCONTINUED | OUTPATIENT
Start: 2025-02-13 | End: 2025-02-12

## 2025-02-12 RX ORDER — SODIUM CHLORIDE 450 MG/100ML
INJECTION, SOLUTION INTRAVENOUS CONTINUOUS
Status: DISCONTINUED | OUTPATIENT
Start: 2025-02-12 | End: 2025-02-12 | Stop reason: HOSPADM

## 2025-02-12 RX ORDER — ALLOPURINOL 300 MG/1
300 TABLET ORAL DAILY
Status: DISCONTINUED | OUTPATIENT
Start: 2025-02-13 | End: 2025-02-13 | Stop reason: HOSPADM

## 2025-02-12 RX ORDER — PREDNISONE 10 MG/1
10 TABLET ORAL DAILY
Status: DISCONTINUED | OUTPATIENT
Start: 2025-02-12 | End: 2025-02-12

## 2025-02-12 RX ORDER — OXYCODONE AND ACETAMINOPHEN 5; 325 MG/1; MG/1
1 TABLET ORAL EVERY 4 HOURS PRN
Status: DISCONTINUED | OUTPATIENT
Start: 2025-02-12 | End: 2025-02-13 | Stop reason: HOSPADM

## 2025-02-12 RX ORDER — LIDOCAINE 40 MG/G
CREAM TOPICAL
Status: DISCONTINUED | OUTPATIENT
Start: 2025-02-12 | End: 2025-02-12 | Stop reason: HOSPADM

## 2025-02-12 RX ORDER — AMOXICILLIN 250 MG
1 CAPSULE ORAL
Status: DISCONTINUED | OUTPATIENT
Start: 2025-02-12 | End: 2025-02-13 | Stop reason: HOSPADM

## 2025-02-12 RX ORDER — BUMETANIDE 1 MG/1
2 TABLET ORAL
Status: DISCONTINUED | OUTPATIENT
Start: 2025-02-12 | End: 2025-02-13 | Stop reason: HOSPADM

## 2025-02-12 RX ORDER — LOSARTAN POTASSIUM 25 MG/1
25 TABLET ORAL DAILY
Status: DISCONTINUED | OUTPATIENT
Start: 2025-02-13 | End: 2025-02-13 | Stop reason: HOSPADM

## 2025-02-12 RX ORDER — FENTANYL CITRATE 50 UG/ML
INJECTION, SOLUTION INTRAMUSCULAR; INTRAVENOUS
Status: DISCONTINUED | OUTPATIENT
Start: 2025-02-12 | End: 2025-02-12 | Stop reason: HOSPADM

## 2025-02-12 RX ORDER — CEFAZOLIN SODIUM 2 G/100ML
INJECTION, SOLUTION INTRAVENOUS CONTINUOUS PRN
Status: COMPLETED | OUTPATIENT
Start: 2025-02-12 | End: 2025-02-12

## 2025-02-12 RX ADMIN — ATORVASTATIN CALCIUM 20 MG: 20 TABLET, FILM COATED ORAL at 20:41

## 2025-02-12 RX ADMIN — SODIUM CHLORIDE: 4.5 INJECTION, SOLUTION INTRAVENOUS at 10:32

## 2025-02-12 RX ADMIN — SENNOSIDES AND DOCUSATE SODIUM 1 TABLET: 50; 8.6 TABLET ORAL at 23:25

## 2025-02-12 RX ADMIN — POTASSIUM CHLORIDE 60 MEQ: 1500 TABLET, EXTENDED RELEASE ORAL at 18:02

## 2025-02-12 RX ADMIN — RIVAROXABAN 20 MG: 20 TABLET, FILM COATED ORAL at 18:02

## 2025-02-12 RX ADMIN — BUMETANIDE 2 MG: 1 TABLET ORAL at 18:02

## 2025-02-12 RX ADMIN — ACETAMINOPHEN 650 MG: 325 TABLET, FILM COATED ORAL at 23:25

## 2025-02-12 ASSESSMENT — ACTIVITIES OF DAILY LIVING (ADL)
ADLS_ACUITY_SCORE: 59
ADLS_ACUITY_SCORE: 58
ADLS_ACUITY_SCORE: 59
ADLS_ACUITY_SCORE: 58
ADLS_ACUITY_SCORE: 59
ADLS_ACUITY_SCORE: 58
ADLS_ACUITY_SCORE: 59

## 2025-02-12 NOTE — PROGRESS NOTES
[facility] Post Procedure Note (AVN Ablation and PPM)    Patient Information  Name: Mike Schultz  Age: 74 year old    Post Procedure  Time patient returned to [facility]: 12:45  Concerns/abnormal assessment: None  Plan/Other: Continue to monitor patient.  Bedrest for four hours.  [facility] to [facility] when bed available.

## 2025-02-12 NOTE — PROGRESS NOTES
Uneventful AVN ablation and implantation of a dual chamber ppm with bundle pacing. Observe pt overnight as he is pacer dependent.

## 2025-02-12 NOTE — Clinical Note
Potential access sites were evaluated for patency using ultrasound.   The right subclavian vein was selected. Access was obtained under with Sonosite and Fluoroscopic guidance using a micropuncture 21 gauge needle with direct visualization of needle entry.

## 2025-02-12 NOTE — PROGRESS NOTES
[facility] Admission Nursing Note    Patient Information  Name: Mike Schultz  Age: 74 year old  Reason for admission: PPM and Ablation   [facility] arrival time: ~1000     Visitor Information  Name: Katty KEITA     Patient Admission/Assessment   Pre-procedure assessment complete: Yes  If abnormal assessment/labs, provider notified: N/A  NPO: Yes  Medications held per instructions/orders: Yes  Consent: obtained  Patient oriented to room: Yes  Education/questions answered: Yes  Plan/other: Procedure ~1200    Discharge Planning  Discharge name/phone number: [phone]  Overnight post sedation caregiver: S.O  Discharge location: home    Kenyetta Nagy RN

## 2025-02-12 NOTE — PROGRESS NOTES
Patient s/p AVN Ablation and PPM.    Patient transferred to room 243, via cart, accompanied by RN.  Detailed report given to PRABHA Neff.  All belongings sent.  Wife Katty present.

## 2025-02-13 ENCOUNTER — APPOINTMENT (OUTPATIENT)
Dept: GENERAL RADIOLOGY | Facility: CLINIC | Age: 75
End: 2025-02-13
Attending: INTERNAL MEDICINE
Payer: COMMERCIAL

## 2025-02-13 ENCOUNTER — DOCUMENTATION ONLY (OUTPATIENT)
Dept: CARDIOLOGY | Facility: CLINIC | Age: 75
End: 2025-02-13
Payer: COMMERCIAL

## 2025-02-13 VITALS
WEIGHT: 289.8 LBS | DIASTOLIC BLOOD PRESSURE: 99 MMHG | RESPIRATION RATE: 16 BRPM | HEART RATE: 89 BPM | HEIGHT: 68 IN | OXYGEN SATURATION: 97 % | TEMPERATURE: 97.4 F | BODY MASS INDEX: 43.92 KG/M2 | SYSTOLIC BLOOD PRESSURE: 139 MMHG

## 2025-02-13 DIAGNOSIS — Z95.0 CARDIAC PACEMAKER IN SITU: Primary | ICD-10-CM

## 2025-02-13 LAB — HOLD SPECIMEN: NORMAL

## 2025-02-13 PROCEDURE — 99239 HOSP IP/OBS DSCHRG MGMT >30: CPT | Mod: 24 | Performed by: INTERNAL MEDICINE

## 2025-02-13 PROCEDURE — G0378 HOSPITAL OBSERVATION PER HR: HCPCS

## 2025-02-13 PROCEDURE — 250N000013 HC RX MED GY IP 250 OP 250 PS 637: Performed by: INTERNAL MEDICINE

## 2025-02-13 PROCEDURE — 999N000065 XR CHEST 2 VIEWS

## 2025-02-13 RX ORDER — NALOXONE HYDROCHLORIDE 0.4 MG/ML
0.4 INJECTION, SOLUTION INTRAMUSCULAR; INTRAVENOUS; SUBCUTANEOUS
Status: DISCONTINUED | OUTPATIENT
Start: 2025-02-13 | End: 2025-02-13 | Stop reason: HOSPADM

## 2025-02-13 RX ORDER — NALOXONE HYDROCHLORIDE 0.4 MG/ML
0.2 INJECTION, SOLUTION INTRAMUSCULAR; INTRAVENOUS; SUBCUTANEOUS
Status: DISCONTINUED | OUTPATIENT
Start: 2025-02-13 | End: 2025-02-13 | Stop reason: HOSPADM

## 2025-02-13 RX ADMIN — LOSARTAN POTASSIUM 25 MG: 25 TABLET, FILM COATED ORAL at 09:08

## 2025-02-13 RX ADMIN — POTASSIUM CHLORIDE 60 MEQ: 1500 TABLET, EXTENDED RELEASE ORAL at 09:08

## 2025-02-13 RX ADMIN — BUMETANIDE 2 MG: 1 TABLET ORAL at 09:08

## 2025-02-13 RX ADMIN — ALLOPURINOL 300 MG: 300 TABLET ORAL at 09:08

## 2025-02-13 ASSESSMENT — ACTIVITIES OF DAILY LIVING (ADL)
ADLS_ACUITY_SCORE: 58

## 2025-02-13 NOTE — DISCHARGE INSTRUCTIONS
Discharge Instructions Post AV Node Ablation and Pacemaker Implantation    You have had a procedure to insert a pacemaker. Once inside your body, this small electronic device helps keep your heart from beating too slowly. A pacemaker can t fix existing heart problems. But it can help you feel better and have more energy. As you recover, follow all of the instructions you are given, including those below.    After you go home:  Have an adult stay with you until tomorrow.  You may resume your normal diet.       For 24 hours - due to the sedation you received:  Relax and take it easy.  Do NOT make any important or legal decisions.  Do NOT drive or operate machines at home or at work.  Do NOT drink alcohol.    Activity for Chest Incision:  Follow the instructions you are given about limiting your activity.  Do not raise your left arm above shoulder level for 2 weeks (until 2/26/25). This gives the device lead wires time to attach securely inside your heart.  Do not lift more than 10 pounds for 1 week  Ask your doctor when you can expect to return to work.  You can still exercise. It s good for your body and your heart. Talk with your doctor about an exercise plan.    Activity for Groin Site:  For 3 days:  No stooping or squatting  Do NOT do any heavy activity such as exercise, lifting, or straining.   No housework, yard work or any activity that make you sweat    Care of Groin Puncture Site:    For the first 24 hrs - check the puncture site every 1-2 hours while awake.  For the first 2 days, when you cough, sneeze, laugh or move your bowels, hold your hand over the puncture site and press firmly.  Remove the bandaid after 24 hours. If there is minor oozing, apply another bandaid and remove it after 12 hours.  It is normal to have a small bruise or pea size lump at the site.  Do NOT take a bath, or use a hot tub or pool for at least 3 days. Do NOT scrub the site. Do not use lotion or powder near the puncture site.  If  you start bleeding from the site in your groin, lie down flat and press firmly on the site for 10 minutes.   Once bleeding stops, lay flat for 2 hours.   Call [facility] as soon as you can.     Call 911 right away if you have heavy bleeding or bleeding that does not stop.    Care of and Precautions for Chest Incision:  Follow your doctor's directions carefully for wound care:   If you have a pressure dressing in place (large amount of gauze with a stretchy bandage over it that goes across your whole chest), this can be removed 24 hours after your procedure.  Do not remove the Aquacel dressing (tan, rectangular, rubbery looking bandage) that is underneath it.   Leave the Aquacel dressing in place until your 1 week device check.  This will be removed by the device nurse at that time, as appropriate.  The Aquacel dressing is water proof, you may shower with it on starting the morning after your procedure. If the edges of the dressing are peeling off, do NOT shower and contact your clinic for further instruction at [phone].   No soaking in bath tub, swimming pool or hot tub until you are cleared at your 6-8 week check  Before you receive any treatment, tell all health care providers (including your dentist) that you have a pacemaker.  You will be given an ID card that contains information about your pacemaker. Always carry this card with you. You can show this card if your pacemaker sets off a metal detector. You should also show it to avoid screening with a hand-held security wand.  Keep your cell phone away from your pacemaker. Don t carry the phone in your shirt pocket, even when it s turned off.  Avoid strong magnets. Examples are those used in MRIs or in hand-held security wands.  Avoid strong electrical fields. Examples are those made by radio transmitting towers,  ham  radios, and heavy-duty electrical equipment.  Avoid leaning over the open betancourt of a running car. A running engine creates an electrical  field. Most household and yard appliances will not cause any problems. If you use any large power tools, such as an industrial , talk with your doctor.     Medicines:  Take your medications, including blood thinners, unless your provider tells you not to.  If you have stopped any medicines, check with your provider about when to restart them.  If you have pain or shortness of breath, you may take Advil (ibuprofen) or Tylenol (acetaminophen).    Follow-Up  Follow up in the device clinic as scheduled.   You have an appointment scheduled on *** at ***  Make regular follow-up appointments with your device clinic. They will check the pacemaker to make sure it s working properly.    When to Call Your Device Clinic [phone]  Call your Device Clinic if you have any of the following:  Dizziness  Chest pain  Lack of energy  Fainting spells  Rapid pulse or pounding heartbeat  Shortness of breath  Increased pain around your pacemaker  Fever above 100.4 F (38 C) or other signs of infection (redness, swelling, drainage, or warmth at the incision site)    Call the clinic if you notice any of the following with your groin puncture site:   You have increased pain or a large or growing hard lump around the site.  The site is red, swollen, hot or tender.  Blood or fluid is draining from the site.  You have chills or a fever greater than 101 F (38 C).  Your leg feels numb, cool or changes color.  Increased pain in the chest and/or groin.  Increased shortness of breath  Chest pain not relieved by Tylenol or Advil  New pain in the back or belly that you cannot control with Tylenol.  Recurrent irregular or fast heart rate lasting over 2 hours.  Any questions or concerns.       4675-0525 [employer]. [address] [place] [address], [place] [address]. All rights reserved. This information is not intended as a substitute for professional medical care. Always follow your healthcare professional's instructions.    [facility] in [place]: [phone]  Device Clinic (Monday to Friday, 8am-4pm): [phone]  *The device clinic is closed on weekends and holidays.  Any calls received during this time will be answered on the next business  day. For any urgent questions after hours, please call the main clinic number and you will be put in contact with the cardiologist on call.

## 2025-02-13 NOTE — PROGRESS NOTES
Patient is A/O x 4, Vss, denies pain, up independently in the room, tele V-paced, patient had AVNA  and implantation of Dual chamber pace maker, right groin site CDI, no hematoma, denies pain, dressing right upper chest CDI, discharge to home tomorrow.

## 2025-02-13 NOTE — PROGRESS NOTES
Pt A&O x4. Voiding in the bathroom, independent in the room. On room air, PIV SL. Tolerating a regular diet. R groin and PPM site- CDI. Plan of care ongoing.

## 2025-02-13 NOTE — PROGRESS NOTES
Post Device Implant InstructionsUneventful AVN ablation and implantation of a dual chamber ppm with bundle pacing (Dr Day)     Dressing:  Clean, dry, and intact  Chest XR reviewed:  Yes  Pneumo present?:  No  Lead Measurements per Device Rep:  WNL    Education Provided:  - Avoid raising right arm above the level of the shoulder for 2 weeks.(until 2/26/25)  - No lifting >10 pounds for the first week   - For strenuous sports such as tennis, pickle ball, basketball, golf, or weight lifting wait 4 weeks to ensure stable lead healing.   - If there is a pressure dressing in place, this can be removed after 24 hours.   - Leave Aquacel dressing in place.  Okay to shower the day after the procedure.  If this begins to peel up, contact the device clinic.    - Aquacel dressing and steri-strips will be removed at 1 week device check, as appropriate  - Limit driving for: 1 week (PPM)  - Watch for redness, drainage, warmth, or fever. Call device clinic if any signs of infection.   - No soaking in bath tub, swimming pool or hot tub until you are cleared at your 6-8 week check  - Call Device Clinic with increased swelling, drainage, or fever > 101 degrees.     - Follow-up with the Device Clinic as scheduled.     Pt verbalized understanding of instructions and AVS updated.

## 2025-02-13 NOTE — DISCHARGE SUMMARY
[facility]  Electrophysiology Cardiology Discharge Summary  Date of Service: 02/13/2025  Primary Cardiologist: Dr. Muse, Dr. Day    Assessment & Plan   Mike Schultz is a 74 year old male with CKD, diabetes mellitus type 2, obesity, and persistent atrial fibrillation with recent stroke, HFpEF, coronary artery disease, and severe aortic stenosis status post TAVR 12/2024.  On 2/12/2025, patient underwent permanent pacemaker implantation and AV enrrique ablation in order to achieve definitive rate control.    Assessment:  1.  Persistent atrial fibrillation status post dual-chamber permanent pacemaker implantation and AV enrrique ablation 2/12/2025.    Plan:   CXR and device interrogation reviewed  Activity restrictions as discussed  Outpatient device clinic follow-up as arranged  Okay to discharge    30+ minutes spent during today's encounter    Sulema Dobbs PA-C  [facility]  Pager: [phone]    Interval History   No significant overnight events.  Resting comfortably in his chair.  No cardiac complaints.    Physical Exam   Temp: 98  F (36.7  C) Temp src: Oral BP: (!) 127/98 Pulse: 88   Resp: 16 SpO2: 98 % O2 Device: None (Room air) Oxygen Delivery: 2 LPM  Vitals:    02/12/25 1014 02/13/25 0545   Weight: 132 kg (291 lb) 131.5 kg (289 lb 12.8 oz)       GEN: well nourished, in no acute distress.  HEENT:  Pupils equal, round. Sclerae nonicteric.   NECK: Supple, no masses appreciated.  Unable to accurately assess JVP secondary to body habitus.  C/V:  Regular rate and rhythm.  Right infraclavicular incision C/D/I.  RESP: Respirations are unlabored. Clear to auscultation bilaterally without wheezing, rales, or rhonchi.  GI: Abdomen soft, nontender.  EXTREM: Chronic venous stasis changes in bilateral lower extremities  NEURO: Alert and oriented, cooperative.  SKIN: Warm and dry.     Medications   Current Facility-Administered Medications   Medication Dose Route Frequency  Provider Last Rate Last Admin     Current Facility-Administered Medications   Medication Dose Route Frequency Provider Last Rate Last Admin    allopurinol (ZYLOPRIM) tablet 300 mg  300 mg Oral Daily Fady Day MD        atorvastatin (LIPITOR) tablet 20 mg  20 mg Oral QPM Fady Day MD   20 mg at 02/12/25 2041    bumetanide (BUMEX) tablet 2 mg  2 mg Oral BID Fady Day MD   2 mg at 02/12/25 1802    cetirizine (zyrTEC) tablet 10 mg  10 mg Oral QPM Fady Day MD        finasteride (PROPECIA) tablet 1 mg  1 mg Oral Daily Fady Day MD        losartan (COZAAR) tablet 25 mg  25 mg Oral Daily Fady Day MD        potassium chloride pushpa ER (KLOR-CON M20) CR tablet 60 mEq  60 mEq Oral BID w/meals Fady Day MD   60 mEq at 02/12/25 1802    rivaroxaban ANTICOAGULANT (XARELTO) tablet 20 mg  20 mg Oral Daily with supper Fady Day MD   20 mg at 02/12/25 1802    [START ON 2/14/2025] Tirzepatide SOAJ 5 mg  5 mg Subcutaneous Q7 Days Fady Day MD        Vitamin D3 (CHOLECALCIFEROL) tablet 125 mcg  125 mcg Oral Daily Fady Day MD           Data   Last 24 hours labs reviewed     Tele: Underlying atrial fibrillation with ventricular pacing    CXR 2/13/2025  IMPRESSION: New cardiac pacemaker implanted on the right side with leads in the right atrium and right ventricle. No pneumothorax. The lungs are clear.

## 2025-02-18 LAB
ATRIAL RATE - MUSE: 81 BPM
DIASTOLIC BLOOD PRESSURE - MUSE: NORMAL MMHG
INTERPRETATION ECG - MUSE: NORMAL
P AXIS - MUSE: NORMAL DEGREES
PR INTERVAL - MUSE: NORMAL MS
QRS DURATION - MUSE: 114 MS
QT - MUSE: 448 MS
QTC - MUSE: 545 MS
R AXIS - MUSE: -2 DEGREES
SYSTOLIC BLOOD PRESSURE - MUSE: NORMAL MMHG
T AXIS - MUSE: 263 DEGREES
VENTRICULAR RATE- MUSE: 89 BPM

## 2025-02-20 ENCOUNTER — MYC MEDICAL ADVICE (OUTPATIENT)
Dept: INTERNAL MEDICINE | Facility: CLINIC | Age: 75
End: 2025-02-20
Payer: COMMERCIAL

## 2025-02-20 ENCOUNTER — NURSE TRIAGE (OUTPATIENT)
Dept: INTERNAL MEDICINE | Facility: CLINIC | Age: 75
End: 2025-02-20
Payer: COMMERCIAL

## 2025-02-20 DIAGNOSIS — Z20.828 EXPOSURE TO INFLUENZA: Primary | ICD-10-CM

## 2025-02-20 RX ORDER — OSELTAMIVIR PHOSPHATE 75 MG/1
75 CAPSULE ORAL DAILY
Qty: 7 CAPSULE | Refills: 0 | Status: SHIPPED | OUTPATIENT
Start: 2025-02-20 | End: 2025-02-27

## 2025-02-20 NOTE — TELEPHONE ENCOUNTER
Writer dudley with Dr. Emery requesting approval to place order for tamiflu based on dispo for flu exposure protocol. Reviewed medications with provider. Received verbal approval to place order for tamiflu.     Patient Contact    Attempt # 1    Was call answered?  No.  Left message on voicemail with information to call me back.    Upon callback, please update pt that tamiflu rx has been sent to pharmacy.

## 2025-02-20 NOTE — TELEPHONE ENCOUNTER
Patient Contact    Attempt # 2    Was call answered?  Yes, updated pt regarding tamiflu order. Closing encounter.

## 2025-02-20 NOTE — TELEPHONE ENCOUNTER
"1. TYPE of EXPOSURE: \"How were you exposed?\" (e.g., close contact, not a close contact)      Close contact, pt's wife tested positive for flu   2. DATE of EXPOSURE: \"When did the exposure occur?\" (e.g., hour, days, weeks)      2/20/2025  3. PREGNANCY: \"Is there any chance you are pregnant?\" \"When was your last menstrual period?\"      no  4. HIGH RISK for COMPLICATIONS: \"Do you have any heart or lung problems?\" \"Do you have a weakened immune system?\" (e.g., CHF, COPD, asthma, HIV positive, chemotherapy, renal failure, diabetes mellitus, sickle cell anemia)      yes  5. SYMPTOMS: \"Do you have any symptoms?\" (e.g., cough, fever, sore throat, difficulty breathing).      Denies      Influenza-Like Illness (CLAUDIA) RN Standing Order (13+)    Mike Schultz      Age: 74 year old     YOB: 1950    Patient has been triaged using Epic triage guidelines: please see notes above.     Has the patient been seen at a [facility] or [facility] (established in primary or specialty care) in the last two years? Yes     Does the patient have symptoms or been exposed to a confirmed case of influenza?  Exposed- patient has been exposed to a confirmed case of influenza within 48 hours and is asymptomatic.       Due to Exposure, does the patient have ANY of these high risk conditions?   Younger than 5 years of age or age 65 and older Yes   Chronic pulmonary disease such as asthma or COPD Yes   Heart disease (CHF, CAD, anticoagulation d/t arrhytmia, congenital heart anomaly) *HTN alone is excluded Yes   Kidney disease insufficiency  No   Hepatic or Hematologic disorder (e.g. chronic liver disease patient, sickle cell disease) No   Diabetes (Type 1 or Type 2) No   Neurologic and Neurodevelopment Conditions (including disorders of the brain, spinal cord, peripheral nerve, and muscle, such as cerebral palsy, epilepsy (seizure disorders), stroke, intellectual disability, moderate to severe developmental delay, muscular " dystrophy, or spinal cord injury) Yes   Obese with BMI >40 Yes   Is pregnant, may be pregnant, or is within two weeks after delivery No   Is a resident of a chronic care facility No   Is the patient considered a non- Black,  or , or  or  racial or ethnic minority group? No   Is <19 years old and is receiving long term aspirin- or salicylate-containing medications No   Patient has a metabolic disorder No   Patient has had chemotherapy or radiation within the last 3 months No   Patient has had an organ or bone marrow transplant No   Immunosuppression: Caused by medication such as those taking prednisone in excess of 20mg daily Yes   Immunosuppression: Congenital or acquired immunodeficiencies including HIV/AIDS No   Immunosuppression: Asplenia No     Does this patient have ANY of the above conditions? Yes     Do any of the following exclusions apply to the patient?    Patient reports a positive Covid-19 test:   (Encourage at home COVID-19 test) No   Reported Tamiflu allergy or intolerance  No     Does the patient have a history of CrCl less than or equal to 60 ml/min in the previous 12 months?    Estimated Creatinine Clearance: 82.2 mL/min (based on SCr of 1.06 mg/dL).  *If no CrCl on file, proceed with protocol No   Is the patient taking Probenecid?     (Probencecid & Tamiflu together are not recommended) No     Does this patient have ANY of the above exclusions answered Yes?  No     Wt Readings from Last 1 Encounters:   02/13/25 131.5 kg (289 lb 12.8 oz)       Does last documented weight meet parameters?  Yes, Age 1 year and older AND weight is documented within the last 12 months.     RECOMMENDATION:  This patient may benefit from an order of Tamiflu for Chemoprophylaxis treatment of CLAUDIA exposure per the standing order.    Inform patient: Due to your recent exposure to influenza and having a condition on the CDC s high-risk condition list, you may benefit from  taking Tamiflu to prevent or lessen symptoms of influenza. I can send a prescription to your preferred pharmacy. Would you like to proceed?   Yes - Inform patient: If your symptoms progress despite Tamiflu, or if you have concerns about the presence of another infection including coronavirus, please schedule a virtual visit with your provider. RN Proceed to Precautions Reviewed precautions and no additional information is needed.    Use [employer] Influenza Antiviral Treatment (Exposure) QD to find suggested tamiflu order:     Use Clinical References to answer questions about Tamiflu (i.e. side effects)     AGE AND DOSING TABLE FOR EXPOSURE TO INFLUENZA:  Adult or Pediatric Patients >40kg (> 88.1lbs)    Oseltamivir 75mg by mouth once a day for 7 days        Pediatric Dosing by Weight    If 3 to 12 months old Oral Oseltamivir (Tamiflu) 3mg/kg/dose once daily for 7 days; max dosage 30 mg daily   If 9 to 12 months old Oral Oseltamivir (Tamiflu) 3.5mg/kg/dose once daily for 7 days; max dosage 30mg daily   If > 12 months or older and:    15 kg or less (33lbs or less) Oral Oseltamivir (Tamiflu) 30mg once a day for 7 days   > 15 kg to 23 kg (> 33lbs to 50.7lbs) Oral Oseltamivir (Tamiflu) 45mg once a day for 7 days   > 23 kg to 40 kg  (> 50.7lbs to 88.1lbs) Oral Oseltamivir (Tamiflu) 60mg once a day for 7 days   > 40 kg (> 88.1lbs) Oral Oseltamivir (Tamiflu) 75mg once a day for 7 days         Additional educational resources include:  http://www.[employer].com  http://www.cdc.gov/flu/  Loyda Matthew RN      Reason for Disposition   Influenza EXPOSURE within last 48 hours (2 days) and exposed person is HIGH RISK (e.g., age > 64 years, pregnant, HIV+, chronic medical condition)    Additional Information   Negative: Influenza has been diagnosed by a doctor (or NP/PA)   Negative: Influenza suspected (i.e., cough, fever, respiratory symptoms; probable influenza exposure)   Negative: Cough and begins > 7 days after influenza  EXPOSURE   Negative: Influenza EXPOSURE (close contact) within the last 7 days and fever or any respiratory symptoms (i.e., cough, runny or stuffy nose, sore throat)   Negative: Runny or stuffy nose and begins > 7 days after influenza EXPOSURE   Negative: Sore throat and begins > 7 days after influenza EXPOSURE   Negative: Patient sounds very sick or weak to the triager    Protocols used: Influenza (Flu) Exposure-A-[place]

## 2025-02-20 NOTE — TELEPHONE ENCOUNTER
Pt called the clinic stating his wife tested positive for influenza and her doctor advised pt to reach out to PCP for medication.     During the call, pt put writer on hold. Writer waited 5 mins and then hung up.     Routing to basket to have team call pt back to discuss.

## 2025-02-25 ENCOUNTER — ANCILLARY PROCEDURE (OUTPATIENT)
Dept: CARDIOLOGY | Facility: CLINIC | Age: 75
End: 2025-02-25
Attending: INTERNAL MEDICINE
Payer: COMMERCIAL

## 2025-02-25 DIAGNOSIS — Z95.0 CARDIAC PACEMAKER IN SITU: ICD-10-CM

## 2025-02-25 PROCEDURE — 93280 PM DEVICE PROGR EVAL DUAL: CPT | Performed by: INTERNAL MEDICINE

## 2025-02-26 LAB
MDC_IDC_LEAD_CONNECTION_STATUS: NORMAL
MDC_IDC_LEAD_CONNECTION_STATUS: NORMAL
MDC_IDC_LEAD_IMPLANT_DT: NORMAL
MDC_IDC_LEAD_IMPLANT_DT: NORMAL
MDC_IDC_LEAD_LOCATION: NORMAL
MDC_IDC_LEAD_LOCATION: NORMAL
MDC_IDC_LEAD_LOCATION_DETAIL_1: NORMAL
MDC_IDC_LEAD_LOCATION_DETAIL_1: NORMAL
MDC_IDC_LEAD_MFG: NORMAL
MDC_IDC_LEAD_MFG: NORMAL
MDC_IDC_LEAD_MODEL: NORMAL
MDC_IDC_LEAD_MODEL: NORMAL
MDC_IDC_LEAD_POLARITY_TYPE: NORMAL
MDC_IDC_LEAD_POLARITY_TYPE: NORMAL
MDC_IDC_LEAD_SERIAL: NORMAL
MDC_IDC_LEAD_SERIAL: NORMAL
MDC_IDC_MSMT_BATTERY_DTM: NORMAL
MDC_IDC_MSMT_BATTERY_REMAINING_LONGEVITY: 144 MO
MDC_IDC_MSMT_BATTERY_RRT_TRIGGER: 2.62
MDC_IDC_MSMT_BATTERY_STATUS: NORMAL
MDC_IDC_MSMT_BATTERY_VOLTAGE: 3.22 V
MDC_IDC_MSMT_LEADCHNL_RA_IMPEDANCE_VALUE: 323 OHM
MDC_IDC_MSMT_LEADCHNL_RA_IMPEDANCE_VALUE: 570 OHM
MDC_IDC_MSMT_LEADCHNL_RA_SENSING_INTR_AMPL: 1 MV
MDC_IDC_MSMT_LEADCHNL_RA_SENSING_INTR_AMPL: 1.62 MV
MDC_IDC_MSMT_LEADCHNL_RV_IMPEDANCE_VALUE: 456 OHM
MDC_IDC_MSMT_LEADCHNL_RV_IMPEDANCE_VALUE: 608 OHM
MDC_IDC_MSMT_LEADCHNL_RV_PACING_THRESHOLD_AMPLITUDE: 0.5 V
MDC_IDC_MSMT_LEADCHNL_RV_PACING_THRESHOLD_PULSEWIDTH: 0.4 MS
MDC_IDC_MSMT_LEADCHNL_RV_SENSING_INTR_AMPL: 10.88 MV
MDC_IDC_MSMT_LEADCHNL_RV_SENSING_INTR_AMPL: 11.62 MV
MDC_IDC_PG_IMPLANT_DTM: NORMAL
MDC_IDC_PG_MFG: NORMAL
MDC_IDC_PG_MODEL: NORMAL
MDC_IDC_PG_SERIAL: NORMAL
MDC_IDC_PG_TYPE: NORMAL
MDC_IDC_SESS_CLINIC_NAME: NORMAL
MDC_IDC_SESS_DTM: NORMAL
MDC_IDC_SESS_TYPE: NORMAL
MDC_IDC_SET_BRADY_AT_MODE_SWITCH_RATE: 171 {BEATS}/MIN
MDC_IDC_SET_BRADY_HYSTRATE: NORMAL
MDC_IDC_SET_BRADY_LOWRATE: 75 {BEATS}/MIN
MDC_IDC_SET_BRADY_MAX_SENSOR_RATE: 130 {BEATS}/MIN
MDC_IDC_SET_BRADY_MAX_TRACKING_RATE: 130 {BEATS}/MIN
MDC_IDC_SET_BRADY_MODE: NORMAL
MDC_IDC_SET_BRADY_PAV_DELAY_LOW: 180 MS
MDC_IDC_SET_BRADY_SAV_DELAY_LOW: 150 MS
MDC_IDC_SET_LEADCHNL_RA_PACING_AMPLITUDE: 2.25 V
MDC_IDC_SET_LEADCHNL_RA_PACING_ANODE_ELECTRODE_1: NORMAL
MDC_IDC_SET_LEADCHNL_RA_PACING_ANODE_LOCATION_1: NORMAL
MDC_IDC_SET_LEADCHNL_RA_PACING_CAPTURE_MODE: NORMAL
MDC_IDC_SET_LEADCHNL_RA_PACING_CATHODE_ELECTRODE_1: NORMAL
MDC_IDC_SET_LEADCHNL_RA_PACING_CATHODE_LOCATION_1: NORMAL
MDC_IDC_SET_LEADCHNL_RA_PACING_POLARITY: NORMAL
MDC_IDC_SET_LEADCHNL_RA_PACING_PULSEWIDTH: 0.4 MS
MDC_IDC_SET_LEADCHNL_RA_SENSING_ANODE_ELECTRODE_1: NORMAL
MDC_IDC_SET_LEADCHNL_RA_SENSING_ANODE_LOCATION_1: NORMAL
MDC_IDC_SET_LEADCHNL_RA_SENSING_CATHODE_ELECTRODE_1: NORMAL
MDC_IDC_SET_LEADCHNL_RA_SENSING_CATHODE_LOCATION_1: NORMAL
MDC_IDC_SET_LEADCHNL_RA_SENSING_POLARITY: NORMAL
MDC_IDC_SET_LEADCHNL_RA_SENSING_SENSITIVITY: 0.15 MV
MDC_IDC_SET_LEADCHNL_RV_PACING_AMPLITUDE: 2.25 V
MDC_IDC_SET_LEADCHNL_RV_PACING_ANODE_ELECTRODE_1: NORMAL
MDC_IDC_SET_LEADCHNL_RV_PACING_ANODE_LOCATION_1: NORMAL
MDC_IDC_SET_LEADCHNL_RV_PACING_CAPTURE_MODE: NORMAL
MDC_IDC_SET_LEADCHNL_RV_PACING_CATHODE_ELECTRODE_1: NORMAL
MDC_IDC_SET_LEADCHNL_RV_PACING_CATHODE_LOCATION_1: NORMAL
MDC_IDC_SET_LEADCHNL_RV_PACING_POLARITY: NORMAL
MDC_IDC_SET_LEADCHNL_RV_PACING_PULSEWIDTH: 0.4 MS
MDC_IDC_SET_LEADCHNL_RV_SENSING_ANODE_ELECTRODE_1: NORMAL
MDC_IDC_SET_LEADCHNL_RV_SENSING_ANODE_LOCATION_1: NORMAL
MDC_IDC_SET_LEADCHNL_RV_SENSING_CATHODE_ELECTRODE_1: NORMAL
MDC_IDC_SET_LEADCHNL_RV_SENSING_CATHODE_LOCATION_1: NORMAL
MDC_IDC_SET_LEADCHNL_RV_SENSING_POLARITY: NORMAL
MDC_IDC_SET_LEADCHNL_RV_SENSING_SENSITIVITY: 0.9 MV
MDC_IDC_SET_ZONE_DETECTION_INTERVAL: 350 MS
MDC_IDC_SET_ZONE_DETECTION_INTERVAL: 400 MS
MDC_IDC_SET_ZONE_STATUS: NORMAL
MDC_IDC_SET_ZONE_STATUS: NORMAL
MDC_IDC_SET_ZONE_TYPE: NORMAL
MDC_IDC_SET_ZONE_VENDOR_TYPE: NORMAL
MDC_IDC_STAT_AT_BURDEN_PERCENT: 100 %
MDC_IDC_STAT_AT_DTM_END: NORMAL
MDC_IDC_STAT_AT_DTM_START: NORMAL
MDC_IDC_STAT_BRADY_DTM_END: NORMAL
MDC_IDC_STAT_BRADY_DTM_START: NORMAL
MDC_IDC_STAT_BRADY_RA_PERCENT_PACED: 0 %
MDC_IDC_STAT_BRADY_RV_PERCENT_PACED: 95.97 %
MDC_IDC_STAT_EPISODE_RECENT_COUNT: 0
MDC_IDC_STAT_EPISODE_RECENT_COUNT_DTM_END: NORMAL
MDC_IDC_STAT_EPISODE_RECENT_COUNT_DTM_START: NORMAL
MDC_IDC_STAT_EPISODE_TOTAL_COUNT: 0
MDC_IDC_STAT_EPISODE_TOTAL_COUNT: 1
MDC_IDC_STAT_EPISODE_TOTAL_COUNT_DTM_END: NORMAL
MDC_IDC_STAT_EPISODE_TOTAL_COUNT_DTM_START: NORMAL
MDC_IDC_STAT_EPISODE_TYPE: NORMAL
MDC_IDC_STAT_TACHYTHERAPY_RECENT_DTM_END: NORMAL
MDC_IDC_STAT_TACHYTHERAPY_RECENT_DTM_START: NORMAL
MDC_IDC_STAT_TACHYTHERAPY_TOTAL_DTM_END: NORMAL
MDC_IDC_STAT_TACHYTHERAPY_TOTAL_DTM_START: NORMAL

## 2025-02-27 ENCOUNTER — OFFICE VISIT (OUTPATIENT)
Dept: PULMONOLOGY | Facility: CLINIC | Age: 75
End: 2025-02-27
Attending: INTERNAL MEDICINE
Payer: COMMERCIAL

## 2025-02-27 VITALS
SYSTOLIC BLOOD PRESSURE: 123 MMHG | HEART RATE: 78 BPM | BODY MASS INDEX: 43.67 KG/M2 | OXYGEN SATURATION: 94 % | DIASTOLIC BLOOD PRESSURE: 77 MMHG | WEIGHT: 287.2 LBS

## 2025-02-27 DIAGNOSIS — J96.01 ACUTE RESPIRATORY FAILURE WITH HYPOXIA (H): ICD-10-CM

## 2025-02-27 DIAGNOSIS — J84.9 ILD (INTERSTITIAL LUNG DISEASE) (H): Primary | ICD-10-CM

## 2025-02-27 DIAGNOSIS — J84.9 ILD (INTERSTITIAL LUNG DISEASE) (H): ICD-10-CM

## 2025-02-27 LAB
6 MIN WALK (FT): 1000 FT
6 MIN WALK (M): 305 M
DLCOCOR-%PRED-PRE: 88 %
DLCOCOR-PRE: 21.14 ML/MIN/MMHG
DLCOUNC-%PRED-PRE: 90 %
DLCOUNC-PRE: 21.71 ML/MIN/MMHG
DLCOUNC-PRED: 23.9 ML/MIN/MMHG
ERV-%PRED-PRE: 46 %
ERV-PRE: 0.61 L
ERV-PRED: 1.31 L
EXPTIME-PRE: 8.96 SEC
FEF2575-%PRED-PRE: 87 %
FEF2575-PRE: 1.81 L/SEC
FEF2575-PRED: 2.07 L/SEC
FEFMAX-%PRED-PRE: 101 %
FEFMAX-PRE: 7.67 L/SEC
FEFMAX-PRED: 7.56 L/SEC
FEV1-%PRED-PRE: 84 %
FEV1-PRE: 2.29 L
FEV1FEV6-PRE: 77 %
FEV1FEV6-PRED: 77 %
FEV1FVC-PRE: 77 %
FEV1FVC-PRED: 77 %
FEV1SVC-PRE: 74 %
FEV1SVC-PRED: 69 %
FIFMAX-PRE: 4.08 L/SEC
FRCPLETH-%PRED-PRE: 92 %
FRCPLETH-PRE: 3.24 L
FRCPLETH-PRED: 3.52 L
FVC-%PRED-PRE: 84 %
FVC-PRE: 2.99 L
FVC-PRED: 3.54 L
IC-%PRED-PRE: 95 %
IC-PRE: 2.49 L
IC-PRED: 2.62 L
RVPLETH-%PRED-PRE: 99 %
RVPLETH-PRE: 2.63 L
RVPLETH-PRED: 2.65 L
TLCPLETH-%PRED-PRE: 85 %
TLCPLETH-PRE: 5.73 L
TLCPLETH-PRED: 6.72 L
VA-%PRED-PRE: 81 %
VA-PRE: 4.85 L
VC-%PRED-PRE: 79 %
VC-PRE: 3.1 L
VC-PRED: 3.89 L

## 2025-02-27 PROCEDURE — 99213 OFFICE O/P EST LOW 20 MIN: CPT | Performed by: INTERNAL MEDICINE

## 2025-02-27 RX ORDER — ALBUTEROL SULFATE 90 UG/1
2 INHALANT RESPIRATORY (INHALATION) EVERY 6 HOURS PRN
Qty: 18 G | Refills: 11 | Status: SHIPPED | OUTPATIENT
Start: 2025-02-27

## 2025-02-27 NOTE — PROGRESS NOTES
"[facility]  Pulmonary Medicine  Visit Clinic Note    Dear patient. Thank you for visiting with me. I want you to feel respected, understood, and empowered. \"Respect\" is valuing you as much as I would a close family member. \"Empowerment\" happens when you are fully informed, and can make the best possible decision for you.  Please ask me questions!  Challenge anything that is not clear.       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  Possiblly AFOP or organizing pneumonia as per MDD.  - Patient was on amiodarone in 2023 when he had pneumonitis but later in May 2024 he was not on amiodarne and still; has exacerbation.  -Possible AFOP.  I treated him with prednisone low dose to get through multiple procedures but since late 2024 he has been off of all prednisone.     -His pfts are stable especially TLC. He has elevated BMI and will benefit from losing weight.  -On weight loss medications.     -We do not need any further immunosuppression but will continue to follow.  His lung pfts are normal and as of now he does not have any active lung disease.     #Sleep Apnea;  -Has CPAP.     #Vaccines:  UTD on flu, covid, rsv     RTC in 6-9 months with pfts.       30 minutes excluding the time spent on cigarette cessation was  spent on the date of the encounter doing chart review, history and exam, documentation and further activities as noted above.    The longitudinal plan of care for the diagnosis(es)/condition(s) as documented were addressed during this visit. Due to the added complexity in care, I will " continue to support Brayden in the subsequent management and with ongoing continuity of care.    These conclusions are made at the best of one's knowledge and belief based on the provided evidence such as patient's history and allergy test results and they can change over time or can be incomplete because of missing information's.    I explained the lab values, imagings and findings to the patient.  Patient expressed understanding I did not recognize any barriers to the understanding of the patient.    The above note was dictated using voice recognition software and may include typographical errors. Please contact the author for any clarifications.    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     Patient was started on steroids again in May2024 and was given extensded 3 months long prednisoen taper. He has been off of prednisone since setp 2024 and his Ct chest in October 2024 showed significant improvement in lung infiltrates.    -His course in last few months of 2024 has been complicated by  stroke, severe aortic stenosis needing a possible valvular procedure.     He has nt smoked since 2011. Total pack years of 34   -Lives in "[facility] and wife is very focused on keeping it clean.  - He has a desk job.   - No recreational drug use.   - No hot tube. NO mould probem in house.   - No pets.   -No family histroy of autoimmune disease.      #2/2025  -Overall stable. Had pacemaker placed and TAVR done without any flare of ILD.  -Pfts are stable.  -Working on diet.          Medications:     Current Outpatient Medications   Medication Sig Dispense Refill    allopurinol (ZYLOPRIM) 300 MG tablet TAKE ONE TABLET (300 MG) BY MOUTH ONE TIME DAILY 90 tablet 2    atorvastatin (LIPITOR) 40 MG tablet Take 0.5 tablets (20 mg) by mouth every evening. 45 tablet 3    bumetanide (BUMEX) 1 MG tablet 2 mg am and 2 mg pm 360 tablet 1    cetirizine (ZYRTEC) 10 MG tablet Take 10 mg by mouth every evening      finasteride (PROPECIA) 1 MG tablet Take 1 tablet (1 mg) by mouth daily 90 tablet 2    losartan (COZAAR) 25 MG tablet Take 1 tablet (25 mg) by mouth daily. 90 tablet 3    ibuprofen (ADVIL/MOTRIN) 200 MG tablet Take 400 mg by mouth every 4 hours as needed for pain ([id number]).      oseltamivir (TAMIFLU) 75 MG capsule Take 1 capsule (75 mg) by mouth daily for 7 days. 7 capsule 0    potassium chloride pushpa ER (KLOR-CON M20) 20 MEQ CR tablet TAKE 3 TABLETS(60 MEQ) BY MOUTH TWICE DAILY 180 tablet 2    predniSONE (DELTASONE) 5 MG tablet Take 10 mg by mouth daily. (Patient not taking: Reported on 2/12/2025)      rivaroxaban ANTICOAGULANT (XARELTO ANTICOAGULANT) 20 MG TABS tablet Take 1 tablet (20 mg) by mouth daily (with dinner). 90 tablet 3    Tirzepatide 5 MG/0.5ML SOAJ Inject 0.5 mLs (5 mg) subcutaneously every 7 days. 6 mL 1    Vitamin D3 (CHOLECALCIFEROL) 125 MCG (5000 UT) tablet Take 125 mcg by mouth daily.       No current facility-administered medications for this visit.            Review of Systems:       A complete 10 point review of systems was otherwise negative except as noted in the HPI.        PHYSICAL EXAM:  /77 (BP Location: Right arm, Patient Position: Sitting, Cuff Size: Adult Large)   Pulse 78   Wt 130.3 kg (287 lb 3.2 oz)   SpO2 94%   BMI 43.67 kg/m       General: Well developed, well nourished, No apparent distress  Eyes:  Anicteric  Nose: Nasal mucosa with no edema or hyperemia.  No polyps  Ears: Hearing grossly normal  Mouth: Oral mucosa is moist, without any lesions. No oropharyngeal exudate.  Respiratory: Good air movement. No crackles. No rhonchi. No wheezes  Cardiac: RRR, normal S1, S2. No murmurs. No JVD  Abdomen: Soft, NT/ND  Musculoskeletal: Extremities normal. No clubbing. No cyanosis. No edema.  Skin: No rash on limited exam  Neuro: Normal mentation. Normal speech.  Psych:Normal affect           Data:   All laboratory and imaging data reviewed.      PFT:           PFT Interpretation:  I personally reviewed and interpreted the PFTs.    CXR: I personally reviewed and interpreted the chest x-ray    Chest CT: I personally reviewed and interpreted the CT scan.    5/2024       10/2024    Recent Results (from the past week)   Cardiac Device Check - In Clinic    Collection Time: 02/25/25 11:23 AM   Result Value Ref Range    Date Time Interrogation Session [id number]     Implantable Pulse Generator  Medtronic     Implantable Pulse Generator Model [id number] Desirae RICHARDS DR MRI     Implantable Pulse Generator Serial Number [id number]     Type Interrogation Session In Clinic     Clinic Name [facility]     Implantable Pulse Generator Type Pacemaker     Implantable Pulse Generator Implant Date 20250212     Implantable Lead  Medtronic     Implantable Lead Model 3830 SelectSecure MRI SureScan     Implantable Lead Serial Number [id number]     Implantable Lead Implant Date 20250212     Implantable Lead Polarity Type Bipolar Lead     Implantable Lead Location Detail 1 UNKNOWN     Implantable Lead Location Right Ventricle     Implantable Lead Connection Status Connected     Implantable Lead  Medtronic     Implantable Lead Model 5076 CapSureFix Novus MRI SureScan     Implantable Lead Serial Number [id number]     Implantable Lead Implant Date 20250212     Implantable Lead Polarity Type Bipolar Lead     Implantable  Lead Location Detail 1 UNKNOWN     Implantable Lead Location Right Atrium     Implantable Lead Connection Status Connected     Yonathan Setting Mode (NBG Code) DDDR     Yonathan Setting Lower Rate Limit 75 [beats]/min    Yonathan Setting Maximum Tracking Rate 130 [beats]/min    Yonathan Setting Maximum Sensor Rate 130 [beats]/min    Yonathan Setting Hysterisis Rate DISABLED     Yonathan Setting STEPHON Delay Low 150 ms    Yonathan Setting PAV Delay Low 180 ms    Yonathan Setting AT Mode Switch Rate 171 [beats]/min    Lead Channel Setting Sensing Polarity Bipolar     Lead Channel Setting Sensing Anode Location Right Atrium     Lead Channel Setting Sensing Anode Terminal Ring     Lead Channel Setting Sensing Cathode Location Right Atrium     Lead Channel Setting Sensing Cathode Terminal Tip     Lead Channel Setting Sensing Sensitivity 0.15 mV    Lead Channel Setting Sensing Polarity Bipolar     Lead Channel Setting Sensing Anode Location Right Ventricle     Lead Channel Setting Sensing Anode Terminal Ring     Lead Channel Setting Sensing Cathode Location Right Ventricle     Lead Channel Setting Sensing Cathode Terminal Tip     Lead Channel Setting Sensing Sensitivity 0.9 mV    Lead Channel Setting Pacing Polarity Bipolar     Lead Channel Setting Pacing Anode Location Right Atrium     Lead Channel Setting Pacing Anode Terminal Ring     Lead Channel Setting Sensing Cathode Location Right Atrium     Lead Channel Setting Sensing Cathode Terminal Tip     Lead Channel Setting Pacing Pulse Width 0.4 ms    Lead Channel Setting Pacing Amplitude 2.25 V    Lead Channel Setting Pacing Capture Mode Adaptive     Lead Channel Setting Pacing Polarity Bipolar     Lead Channel Setting Pacing Anode Location Right Ventricle     Lead Channel Setting Pacing Anode Terminal Ring     Lead Channel Setting Sensing Cathode Location Right Ventricle     Lead Channel Setting Sensing Cathode Terminal Tip     Lead Channel Setting Pacing Pulse Width 0.4 ms    Lead Channel  Setting Pacing Amplitude 2.25 V    Lead Channel Setting Pacing Capture Mode Adaptive     Zone Setting Type Category VF     Zone Setting Vendor Type Category V High Rate     Zone Setting Type Category VT     Zone Setting Vendor Type Category FastVT     Zone Setting Type Category VT     Zone Setting Vendor Type Category VT     Zone Setting Type Category VT     Zone Setting Vendor Type Category MonVT     Zone Setting Status Monitor     Zone Setting Detection Interval 400 ms    Zone Setting Type Category ATRIAL_FIBRILLATION     Zone Setting Vendor Type Category FastATAF     Zone Setting Type Category AT/AF     Zone Setting Status Monitor     Zone Setting Detection Interval 350 ms    Lead Channel Impedance Value 570 ohm    Lead Channel Impedance Value 323 ohm    Lead Channel Sensing Intrinsic Amplitude 1.625 mV    Lead Channel Sensing Intrinsic Amplitude 1 mV    Lead Channel Impedance Value 608 ohm    Lead Channel Impedance Value 456 ohm    Lead Channel Sensing Intrinsic Amplitude 11.625 mV    Lead Channel Sensing Intrinsic Amplitude 10.875 mV    Lead Channel Pacing Threshold Amplitude 0.5 V    Lead Channel Pacing Threshold Pulse Width 0.4 ms    Battery Date Time of Measurements [id number]     Battery Status OK     Battery RRT Trigger 2.625     Battery Remaining Longevity 144 mo    Battery Voltage 3.22 V    Yonathan Statistic Date Time Start 99708177250858     Yonathan Statistic Date Time End 91848570331164     Yonathan Statistic RA Percent Paced 0 %    Yonathan Statistic RV Percent Paced 95.97 %    Atrial Tachy Statistic Date Time Start 10222194044480     Atrial Tachy Statistic Date Time End 55506800240499     Atrial Tachy Statistic AT/AF [place] Percent 100 %    Therapy Statistic Recent Date Time Start 26167916151542     Therapy Statistic Recent Date Time End 90297963556829     Therapy Statistic Total  Date Time Start 48791846305889     Therapy Statistic Total  Date Time End 17659138377189     Episode Statistic Recent Count 0      Episode Statistic Type Category AT/AF     Episode Statistic Recent Count 0     Episode Statistic Type Category Patient Activated     Episode Statistic Recent Count 0     Episode Statistic Type Category SVT     Episode Statistic Recent Count 0     Episode Statistic Type Category VT     Episode Statistic Recent Count 0     Episode Statistic Type Category VT     Episode Statistic Recent Date Time Start 28427780911262     Episode Statistic Recent Date Time End 34367029106534     Episode Statistic Recent Date Time Start 27246809578376     Episode Statistic Recent Date Time End 07668155702288     Episode Statistic Recent Date Time Start 64878321158955     Episode Statistic Recent Date Time End 72210593406046     Episode Statistic Recent Date Time Start 65718143019096     Episode Statistic Recent Date Time End 80771449795008     Episode Statistic Recent Date Time Start 11414541485428     Episode Statistic Recent Date Time End 50306691621693     Episode Statistic Total Count 1     Episode Statistic Type Category AT/AF     Episode Statistic Total Count 0     Episode Statistic Type Category Patient Activated     Episode Statistic Total Count 0     Episode Statistic Type Category SVT     Episode Statistic Total Count 0     Episode Statistic Type Category VT     Episode Statistic Total Count 0     Episode Statistic Type Category VT     Episode Statistic Total Date Time Start 62186161570733     Episode Statistic Total Date Time End 11391787550004     Episode Statistic Total Date Time Start 66111822145427     Episode Statistic Total Date Time End 46074230640862     Episode Statistic Total Date Time Start 90846492466644     Episode Statistic Total Date Time End 42965903751490     Episode Statistic Total Date Time Start 60710277026482     Episode Statistic Total Date Time End 63107019840588     Episode Statistic Total Date Time Start 38553709257439     Episode Statistic Total Date Time End 48832481728708    6 minute walk test     Collection Time: 02/27/25 12:00 AM   Result Value Ref Range    6 min walk (FT) 1,000 1,062 ft    6 Min Walk (M) 305 324 m   General PFT Lab (Please always keep checked)    Collection Time: 02/27/25 10:08 AM   Result Value Ref Range    FVC-Pred 3.54 L    FVC-Pre 2.99 L    FVC-%Pred-Pre 84 %    FEV1-Pre 2.29 L    FEV1-%Pred-Pre 84 %    FEV1FVC-Pred 77 %    FEV1FVC-Pre 77 %    FEFMax-Pred 7.56 L/sec    FEFMax-Pre 7.67 L/sec    FEFMax-%Pred-Pre 101 %    FEF2575-Pred 2.07 L/sec    FEF2575-Pre 1.81 L/sec    BSF3651-%Pred-Pre 87 %    ExpTime-Pre 8.96 sec    FIFMax-Pre 4.08 L/sec    VC-Pred 3.89 L    VC-Pre 3.10 L    VC-%Pred-Pre 79 %    IC-Pred 2.62 L    IC-Pre 2.49 L    IC-%Pred-Pre 95 %    ERV-Pred 1.31 L    ERV-Pre 0.61 L    ERV-%Pred-Pre 46 %    FEV1FEV6-Pred 77 %    FEV1FEV6-Pre 77 %    FRCPleth-Pred 3.52 L    FRCPleth-Pre 3.24 L    FRCPleth-%Pred-Pre 92 %    RVPleth-Pred 2.65 L    RVPleth-Pre 2.63 L    RVPleth-%Pred-Pre 99 %    TLCPleth-Pred 6.72 L    TLCPleth-Pre 5.73 L    TLCPleth-%Pred-Pre 85 %    DLCOunc-Pred 23.90 ml/min/mmHg    DLCOunc-Pre 21.71 ml/min/mmHg    DLCOunc-%Pred-Pre 90 %    DLCOcor-Pre 21.14 ml/min/mmHg    DLCOcor-%Pred-Pre 88 %    VA-Pre 4.85 L    VA-%Pred-Pre 81 %    FEV1SVC-Pred 69 %    FEV1SVC-Pre 74 %

## 2025-02-27 NOTE — LETTER
"2/27/2025      Mike Schultz  [address] [facility] [address]      Dear Colleague,    Thank you for referring your patient, Mike Schultz, to the [facility] FOR LUNG SCIENCE AND [facility]. Please see a copy of my visit note below.    [facility]  Pulmonary Medicine  Visit Clinic Note    Dear patient. Thank you for visiting with me. I want you to feel respected, understood, and empowered. \"Respect\" is valuing you as much as I would a close family member. \"Empowerment\" happens when you are fully informed, and can make the best possible decision for you.  Please ask me questions!  Challenge anything that is not clear.       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  Possiblly AFOP or organizing pneumonia as per MDD.  - Patient was on amiodarone in 2023 when he had pneumonitis but later in May 2024 he was not on amiodarne and still; has exacerbation.  -Possible AFOP.  I treated him with prednisone low dose to get through multiple procedures but since late 2024 he has been off of all prednisone.     -His pfts are stable especially TLC. He has elevated BMI and will benefit from losing weight.  -On weight loss medications.     -We do not need any further immunosuppression but will continue to follow.  His lung pfts are normal and as of now he does not have any active lung disease.     #Sleep Apnea;  -Has CPAP.     #Vaccines:  UTD on flu, covid, rsv     RTC in 6-9 months with pfts.       30 minutes excluding the time spent on cigarette cessation was "  spent on the date of the encounter doing chart review, history and exam, documentation and further activities as noted above.    The longitudinal plan of care for the diagnosis(es)/condition(s) as documented were addressed during this visit. Due to the added complexity in care, I will continue to support Brayden in the subsequent management and with ongoing continuity of care.    These conclusions are made at the best of one's knowledge and belief based on the provided evidence such as patient's history and allergy test results and they can change over time or can be incomplete because of missing information's.    I explained the lab values, imagings and findings to the patient.  Patient expressed understanding I did not recognize any barriers to the understanding of the patient.    The above note was dictated using voice recognition software and may include typographical errors. Please contact the author for any clarifications.    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     Patient was started on steroids again in May2024 and was given extensded 3 months long prednisoen taper. He has been off of prednisone since setp 2024 and his Ct chest in October 2024 showed significant improvement in lung infiltrates.    -His course in last few months of 2024 has been complicated by  stroke, severe aortic stenosis needing a possible valvular procedure.     He  has nt smoked since 2011. Total pack years of 34   -Lives in clarn house and wife is very focused on keeping it clean.  - He has a desk job.   - No recreational drug use.   - No hot tube. NO mould probem in house.   - No pets.   -No family histroy of autoimmune disease.     #2/2025  -Overall stable. Had pacemaker placed and TAVR done without any flare of ILD.  -Pfts are stable.  -Working on diet.          Medications:     Current Outpatient Medications   Medication Sig Dispense Refill     allopurinol (ZYLOPRIM) 300 MG tablet TAKE ONE TABLET (300 MG) BY MOUTH ONE TIME DAILY 90 tablet 2     atorvastatin (LIPITOR) 40 MG tablet Take 0.5 tablets (20 mg) by mouth every evening. 45 tablet 3     bumetanide (BUMEX) 1 MG tablet 2 mg am and 2 mg pm 360 tablet 1     cetirizine (ZYRTEC) 10 MG tablet Take 10 mg by mouth every evening       finasteride (PROPECIA) 1 MG tablet Take 1 tablet (1 mg) by mouth daily 90 tablet 2     losartan (COZAAR) 25 MG tablet Take 1 tablet (25 mg) by mouth daily. 90 tablet 3     ibuprofen (ADVIL/MOTRIN) 200 MG tablet Take 400 mg by mouth every 4 hours as needed for pain ([id number]).       oseltamivir (TAMIFLU) 75 MG capsule Take 1 capsule (75 mg) by mouth daily for 7 days. 7 capsule 0     potassium chloride pushpa ER (KLOR-CON M20) 20 MEQ CR tablet TAKE 3 TABLETS(60 MEQ) BY MOUTH TWICE DAILY 180 tablet 2     predniSONE (DELTASONE) 5 MG tablet Take 10 mg by mouth daily. (Patient not taking: Reported on 2/12/2025)       rivaroxaban ANTICOAGULANT (XARELTO ANTICOAGULANT) 20 MG TABS tablet Take 1 tablet (20 mg) by mouth daily (with dinner). 90 tablet 3     Tirzepatide 5 MG/0.5ML SOAJ Inject 0.5 mLs (5 mg) subcutaneously every 7 days. 6 mL 1     Vitamin D3 (CHOLECALCIFEROL) 125 MCG (5000 UT) tablet Take 125 mcg by mouth daily.       No current facility-administered medications for this visit.            Review of Systems:       A complete 10 point review of systems was otherwise negative except as noted  in the HPI.        PHYSICAL EXAM:  /77 (BP Location: Right arm, Patient Position: Sitting, Cuff Size: Adult Large)   Pulse 78   Wt 130.3 kg (287 lb 3.2 oz)   SpO2 94%   BMI 43.67 kg/m       General: Well developed, well nourished, No apparent distress  Eyes: Anicteric  Nose: Nasal mucosa with no edema or hyperemia.  No polyps  Ears: Hearing grossly normal  Mouth: Oral mucosa is moist, without any lesions. No oropharyngeal exudate.  Respiratory: Good air movement. No crackles. No rhonchi. No wheezes  Cardiac: RRR, normal S1, S2. No murmurs. No JVD  Abdomen: Soft, NT/ND  Musculoskeletal: Extremities normal. No clubbing. No cyanosis. No edema.  Skin: No rash on limited exam  Neuro: Normal mentation. Normal speech.  Psych:Normal affect           Data:   All laboratory and imaging data reviewed.      PFT:           PFT Interpretation:  I personally reviewed and interpreted the PFTs.    CXR: I personally reviewed and interpreted the chest x-ray    Chest CT: I personally reviewed and interpreted the CT scan.    5/2024       10/2024    Recent Results (from the past week)   Cardiac Device Check - In Clinic    Collection Time: 02/25/25 11:23 AM   Result Value Ref Range    Date Time Interrogation Session [id number]     Implantable Pulse Generator  Medtronic     Implantable Pulse Generator Model [id number] Desirae RICHARDS DR MRI     Implantable Pulse Generator Serial Number [id number]     Type Interrogation Session In Clinic     Clinic Name [facility]     Implantable Pulse Generator Type Pacemaker     Implantable Pulse Generator Implant Date 20250212     Implantable Lead  Medtronic     Implantable Lead Model 3830 [facility] MARCIN SureScan     Implantable Lead Serial Number [id number]     Implantable Lead Implant Date 20250212     Implantable Lead Polarity Type Bipolar Lead     Implantable Lead Location Detail 1 UNKNOWN     Implantable Lead Location Right Ventricle     Implantable Lead Connection Status  Connected     Implantable Lead  Medtronic     Implantable Lead Model 5076 CapSureFix Novus MRI SureScan     Implantable Lead Serial Number [id number]     Implantable Lead Implant Date 20250212     Implantable Lead Polarity Type Bipolar Lead     Implantable Lead Location Detail 1 UNKNOWN     Implantable Lead Location Right Atrium     Implantable Lead Connection Status Connected     Yonathan Setting Mode (NBG Code) DDDR     Yonathan Setting Lower Rate Limit 75 [beats]/min    Yonathan Setting Maximum Tracking Rate 130 [beats]/min    Yonathan Setting Maximum Sensor Rate 130 [beats]/min    Yonathan Setting Hysterisis Rate DISABLED     Yonathan Setting STEPHON Delay Low 150 ms    Yonathan Setting PAV Delay Low 180 ms    Yonathan Setting AT Mode Switch Rate 171 [beats]/min    Lead Channel Setting Sensing Polarity Bipolar     Lead Channel Setting Sensing Anode Location Right Atrium     Lead Channel Setting Sensing Anode Terminal Ring     Lead Channel Setting Sensing Cathode Location Right Atrium     Lead Channel Setting Sensing Cathode Terminal Tip     Lead Channel Setting Sensing Sensitivity 0.15 mV    Lead Channel Setting Sensing Polarity Bipolar     Lead Channel Setting Sensing Anode Location Right Ventricle     Lead Channel Setting Sensing Anode Terminal Ring     Lead Channel Setting Sensing Cathode Location Right Ventricle     Lead Channel Setting Sensing Cathode Terminal Tip     Lead Channel Setting Sensing Sensitivity 0.9 mV    Lead Channel Setting Pacing Polarity Bipolar     Lead Channel Setting Pacing Anode Location Right Atrium     Lead Channel Setting Pacing Anode Terminal Ring     Lead Channel Setting Sensing Cathode Location Right Atrium     Lead Channel Setting Sensing Cathode Terminal Tip     Lead Channel Setting Pacing Pulse Width 0.4 ms    Lead Channel Setting Pacing Amplitude 2.25 V    Lead Channel Setting Pacing Capture Mode Adaptive     Lead Channel Setting Pacing Polarity Bipolar     Lead Channel Setting Pacing Anode  Location Right Ventricle     Lead Channel Setting Pacing Anode Terminal Ring     Lead Channel Setting Sensing Cathode Location Right Ventricle     Lead Channel Setting Sensing Cathode Terminal Tip     Lead Channel Setting Pacing Pulse Width 0.4 ms    Lead Channel Setting Pacing Amplitude 2.25 V    Lead Channel Setting Pacing Capture Mode Adaptive     Zone Setting Type Category VF     Zone Setting Vendor Type Category V High Rate     Zone Setting Type Category VT     Zone Setting Vendor Type Category FastVT     Zone Setting Type Category VT     Zone Setting Vendor Type Category VT     Zone Setting Type Category VT     Zone Setting Vendor Type Category MonVT     Zone Setting Status Monitor     Zone Setting Detection Interval 400 ms    Zone Setting Type Category ATRIAL_FIBRILLATION     Zone Setting Vendor Type Category FastATAF     Zone Setting Type Category AT/AF     Zone Setting Status Monitor     Zone Setting Detection Interval 350 ms    Lead Channel Impedance Value 570 ohm    Lead Channel Impedance Value 323 ohm    Lead Channel Sensing Intrinsic Amplitude 1.625 mV    Lead Channel Sensing Intrinsic Amplitude 1 mV    Lead Channel Impedance Value 608 ohm    Lead Channel Impedance Value 456 ohm    Lead Channel Sensing Intrinsic Amplitude 11.625 mV    Lead Channel Sensing Intrinsic Amplitude 10.875 mV    Lead Channel Pacing Threshold Amplitude 0.5 V    Lead Channel Pacing Threshold Pulse Width 0.4 ms    Battery Date Time of Measurements 07952869788739     Battery Status OK     Battery RRT Trigger 2.625     Battery Remaining Longevity 144 mo    Battery Voltage 3.22 V    Yonathan Statistic Date Time Start 15396788965276     Yonathan Statistic Date Time End 02917979412629     Yonathan Statistic RA Percent Paced 0 %    Yonathan Statistic RV Percent Paced 95.97 %    Atrial Tachy Statistic Date Time Start 36625930625086     Atrial Tachy Statistic Date Time End 85534916216915     Atrial Tachy Statistic AT/AF [place] Percent 100 %     Therapy Statistic Recent Date Time Start 55337929481750     Therapy Statistic Recent Date Time End 86544102983775     Therapy Statistic Total  Date Time Start 26726345036371     Therapy Statistic Total  Date Time End 62414701693301     Episode Statistic Recent Count 0     Episode Statistic Type Category AT/AF     Episode Statistic Recent Count 0     Episode Statistic Type Category Patient Activated     Episode Statistic Recent Count 0     Episode Statistic Type Category SVT     Episode Statistic Recent Count 0     Episode Statistic Type Category VT     Episode Statistic Recent Count 0     Episode Statistic Type Category VT     Episode Statistic Recent Date Time Start 68937095745274     Episode Statistic Recent Date Time End 55368493679379     Episode Statistic Recent Date Time Start 80739373123430     Episode Statistic Recent Date Time End 04439387951242     Episode Statistic Recent Date Time Start 19469313792416     Episode Statistic Recent Date Time End 77925651680213     Episode Statistic Recent Date Time Start 20641890893205     Episode Statistic Recent Date Time End 74034293735369     Episode Statistic Recent Date Time Start 10701407134947     Episode Statistic Recent Date Time End 94457847857528     Episode Statistic Total Count 1     Episode Statistic Type Category AT/AF     Episode Statistic Total Count 0     Episode Statistic Type Category Patient Activated     Episode Statistic Total Count 0     Episode Statistic Type Category SVT     Episode Statistic Total Count 0     Episode Statistic Type Category VT     Episode Statistic Total Count 0     Episode Statistic Type Category VT     Episode Statistic Total Date Time Start 59518763337965     Episode Statistic Total Date Time End 29329183027614     Episode Statistic Total Date Time Start 37824296823189     Episode Statistic Total Date Time End 41593070790680     Episode Statistic Total Date Time Start 56731665655908     Episode Statistic Total Date Time End  17531798401325     Episode Statistic Total Date Time Start 22244433662538     Episode Statistic Total Date Time End 66797466008321     Episode Statistic Total Date Time Start 59329099600233     Episode Statistic Total Date Time End 30445396076990    6 minute walk test    Collection Time: 02/27/25 12:00 AM   Result Value Ref Range    6 min walk (FT) 1,000 1,062 ft    6 Min Walk (M) 305 324 m   General PFT Lab (Please always keep checked)    Collection Time: 02/27/25 10:08 AM   Result Value Ref Range    FVC-Pred 3.54 L    FVC-Pre 2.99 L    FVC-%Pred-Pre 84 %    FEV1-Pre 2.29 L    FEV1-%Pred-Pre 84 %    FEV1FVC-Pred 77 %    FEV1FVC-Pre 77 %    FEFMax-Pred 7.56 L/sec    FEFMax-Pre 7.67 L/sec    FEFMax-%Pred-Pre 101 %    FEF2575-Pred 2.07 L/sec    FEF2575-Pre 1.81 L/sec    XNG0355-%Pred-Pre 87 %    ExpTime-Pre 8.96 sec    FIFMax-Pre 4.08 L/sec    VC-Pred 3.89 L    VC-Pre 3.10 L    VC-%Pred-Pre 79 %    IC-Pred 2.62 L    IC-Pre 2.49 L    IC-%Pred-Pre 95 %    ERV-Pred 1.31 L    ERV-Pre 0.61 L    ERV-%Pred-Pre 46 %    FEV1FEV6-Pred 77 %    FEV1FEV6-Pre 77 %    FRCPleth-Pred 3.52 L    FRCPleth-Pre 3.24 L    FRCPleth-%Pred-Pre 92 %    RVPleth-Pred 2.65 L    RVPleth-Pre 2.63 L    RVPleth-%Pred-Pre 99 %    TLCPleth-Pred 6.72 L    TLCPleth-Pre 5.73 L    TLCPleth-%Pred-Pre 85 %    DLCOunc-Pred 23.90 ml/min/mmHg    DLCOunc-Pre 21.71 ml/min/mmHg    DLCOunc-%Pred-Pre 90 %    DLCOcor-Pre 21.14 ml/min/mmHg    DLCOcor-%Pred-Pre 88 %    VA-Pre 4.85 L    VA-%Pred-Pre 81 %    FEV1SVC-Pred 69 %    FEV1SVC-Pre 74 %                                           Again, thank you for allowing me to participate in the care of your patient.        Sincerely,        Vinayak Valles MD    Electronically signed

## 2025-02-27 NOTE — NURSING NOTE
Chief Complaint   Patient presents with    return interstitial lung     Medications reviewed and vital signs taken.   Bakari Mejia, EMT

## 2025-03-05 ENCOUNTER — OFFICE VISIT (OUTPATIENT)
Dept: NEUROLOGY | Facility: CLINIC | Age: 75
End: 2025-03-05
Payer: COMMERCIAL

## 2025-03-05 VITALS — HEART RATE: 81 BPM | SYSTOLIC BLOOD PRESSURE: 117 MMHG | OXYGEN SATURATION: 95 % | DIASTOLIC BLOOD PRESSURE: 77 MMHG

## 2025-03-05 DIAGNOSIS — I63.9 CEREBROVASCULAR ACCIDENT (CVA), UNSPECIFIED MECHANISM (H): Primary | ICD-10-CM

## 2025-03-05 RX ORDER — ASPIRIN 81 MG/1
81 TABLET ORAL DAILY
Qty: 90 TABLET | Refills: 3 | Status: SHIPPED | OUTPATIENT
Start: 2025-03-05

## 2025-03-05 NOTE — LETTER
3/5/2025      Mike Schultz  [address] [facility] [address]      Dear Colleague,    Thank you for referring your patient, Mike Schultz, to the [facility] NEUROLOGY [facility]INICS [facility]. Please see a copy of my visit note below.      Neurology Consultation    Patient Name:  Mike Schultz  MRN:  [id number]    :  1950  Date of Service:  2025  Primary care provider:  Ranjan Courtney        Chief Complaint: Follow-up    History of Present Illness:     Mike Schultz is a 74 year old male who presents for routine follow-up.     He feels like he is back to baseline from the stroke. He does think the right sided facial weakness from a Bell's palsy 3 years ago feels more pronounced after the stroke.      Had TAVR in 2024 for severe aortic stenosis.  AVN ablation and PPM implantation in 2025.  Still has difficulty with shortness of breath.     He fell and injured his right shoulder about 10 years ago. He had a procedure done (unclear what procedure).  Has been weak in the right shoulder since then.      Prior History from 2024:     He feels like he is about 60% back to baseline.  Speech and facial weakness improved although sometimes speech will get slurred. Will sometimes have difficulty remembering people's names.  Still has some numbness/weakness in left hand.       Being worked up for TAVR.  Has not had RAMIRO/Watchman device - cardiology wanting neuro input. CT angiogram no signs of thrombus.      Date of stroke:2024  Symptoms: Left facial weakness, left hand numbness/weakness, and dysarthria   Location of stroke: R frontal, distal M3/M4  Etiology of stroke: Possibly cardioembolic although patient taking Xarelto regularly - no missed doses - for atrial fibrillation.   CHADSVASC2 if patient has a fib:   PFO/Watchman candidate? (If <60): Working with cardiology-consideration of Watchman   CEA/Carotid stenting candidate?: No  Aspirin?: No - on Xarelto   Hx of  aspirin failure?: No  Requiring anticoagulation?: Yes, on Xarelto   Smoker/smoking cessation? Denies tobacco use   Cholesterol:  - start on atorvastatin 40 mg - takes 20 mg as it made him more short of breath to take higher dose?  PT/OT/SLP needs: Working with speech therapy   Sleep apnea?: +Sleep apnea, uses CPAP, plugged in with sleep clinic   Diet: Discussed Mediterranean diet, he has tried to loose weight with diet modifications. Going to talk to PCP about weight loss   Diabetes/control: A1c 6.5%  Current modified baljeet scale: 1-2       Stroke Summary per Inpatient Stroke Team   MRI/Head CT Acute/subacute appearing infarct in the posterior right frontal  lobe. No evidence of hemorrhagic transformation   Intracranial Vasculature Mild-to-moderate stenoses of the cavernous and supraclinoid  ICAs from atherosclerotic disease, . Moderate stenosis at the  origin of the left vertebral artery.   Cervical Vasculature No LVO   CT perfusion images suggests possible bandlike area of  ischemia/infarct in the posterior right frontal lobe region. This  would likely be of a distal right M3/M4 vessel which is not well  evaluated by this CTA.  Echocardiogram EF 55-60%, no regional wall motion abnormalities,moderate to severe aortic stenosis, normal left atrial size    EKG/Telemetry NSR w/ 1st degree AV block      LDL  9/5/2024: 115 mg/dL   A1C  9/5/2024: 6.5 %   Troponin 9/5/2024: 32 ng/L    - CT chest/abd/pelvis: No evidence of malignancy     Physical Exam:  /77   Pulse 81   SpO2 95%     General:  No acute distress  Cardiovascular: Normal rate.  Lung: Respirations are non-labored.  Extremities: Normal range of motion  Integumentary: Warm and dry     Neurologic:  Mental status : alert, fund of knowledge appropriate for visit     LANGUAGE: Speech fluent, very mild dysarthria      CN:  II- pupils equal and reactive, visual fields full  III, IV, VI- extraocular movements intact  V- sensation intact bilaterally  VII-  Mild left lower facial weakness, Right sided synkinesis (lips pull up with right eye closure, right eye closure with smiling), mild right sided myokymia note, mild right lid lag   VIII- hearing intact, no nystagmus  IX, X- palate elevates symmetrically  XI- sternocleidomastoid 5/5  XII- tongue midline     MOTOR : intact bulk and tone  5/5 strength except right shoulder abduction weakness 4     SENSORY : Intact to light touch in BUE and BLE      REFLEXES: Dodge absent     CEREBELLUM: finger to nose, finger taps, and heel to shin intact bilaterally      GAIT : Wider based waddling gait, slight limp on R     Cognition and Speech: Speech clear and coherent.     Psychiatric: Cooperative, Appropriate mood & affect.     Assessment/Plan:   Mike Schultz is a 74 year old year old male who presents for follow-up concerning stroke.       Right frontal stroke   - Etiology: Based on the location of the stroke, I would suspect that this is cardioembolic although can not say for certain and would be unusual to have a cardioembolic stroke while being compliant with Xarelto.  Discussed case with Dr. Rush in stroke neurology.  No studies have proven superiority of Watchman device over DOAC.  Does have other sources for embolism, including intracranial and cervical vasculature atherosclerosis as well significant atherosclerosis of the aortic valve. Will add ASA 81 mg for this.   - Continue Xarelto for secondary stroke prevention, add ASA 81 mg daily      Hypertension  - Goal BP is <130/80 with tighter control associated with improved vascular outcomes.   - Recommend home blood pressure monitoring and keeping a BP log for primary care follow up     Hyperlipidemia  -  -->75 (goal 40-70); continue Lipitor 20 mg with ongoing outpatient titration to achieve LDL goal      Diabetes   - Recent A1c was 6.5%, (goal <7% for secondary stroke prevention), follow-up with PCP     BOWEN  - Continue nightly use of CPAP  - Reviewed that  untreated sleep apnea is an independent risk factors for stroke     Right arm weakness  - Patient had isolated right shoulder abduction (deltoid) weakness. Patient initially not sure when this started or even aware of it but then reported that he had multiple injuries to his shoulder over 10 years ago which caused right shoulder weakness.  Reportedly had some procedure done and recommended surgery as likely to have issues with weakness in that shoulder again?  History is a little confusing - possible axillary nerve damage? However, patient assures me this is not new    Follow-up in 6 months      I spent 47 minutes providing care for this patient, including reviewing imaging, labs, and notes as well as providing counseling and coordination of care for the above issues.      Again, thank you for allowing me to participate in the care of your patient.        Sincerely,        Orin Edmonds, DO    Electronically signed

## 2025-03-05 NOTE — PROGRESS NOTES
Neurology Consultation    Patient Name:  Mike Schultz  MRN:  [id number]    :  1950  Date of Service:  2025  Primary care provider:  Ranjan Courtney        Chief Complaint: Follow-up    History of Present Illness:     Mike Schultz is a 74 year old male who presents for routine follow-up.     He feels like he is back to baseline from the stroke. He does think the right sided facial weakness from a Bell's palsy 3 years ago feels more pronounced after the stroke.      Had TAVR in 2024 for severe aortic stenosis.  AVN ablation and PPM implantation in 2025.  Still has difficulty with shortness of breath.     He fell and injured his right shoulder about 10 years ago. He had a procedure done (unclear what procedure).  Has been weak in the right shoulder since then.      Prior History from 2024:     He feels like he is about 60% back to baseline.  Speech and facial weakness improved although sometimes speech will get slurred. Will sometimes have difficulty remembering people's names.  Still has some numbness/weakness in left hand.       Being worked up for TAVR.  Has not had RAMIRO/Watchman device - cardiology wanting neuro input. CT angiogram no signs of thrombus.      Date of stroke:2024  Symptoms: Left facial weakness, left hand numbness/weakness, and dysarthria   Location of stroke: R frontal, distal M3/M4  Etiology of stroke: Possibly cardioembolic although patient taking Xarelto regularly - no missed doses - for atrial fibrillation.   CHADSVASC2 if patient has a fib:   PFO/Watchman candidate? (If <60): Working with cardiology-consideration of Watchman   CEA/Carotid stenting candidate?: No  Aspirin?: No - on Xarelto   Hx of aspirin failure?: No  Requiring anticoagulation?: Yes, on Xarelto   Smoker/smoking cessation? Denies tobacco use   Cholesterol:  - start on atorvastatin 40 mg - takes 20 mg as it made him more short of breath to take higher dose?  PT/OT/SLP needs:  Working with speech therapy   Sleep apnea?: +Sleep apnea, uses CPAP, plugged in with sleep clinic   Diet: Discussed Mediterranean diet, he has tried to loose weight with diet modifications. Going to talk to PCP about weight loss   Diabetes/control: A1c 6.5%  Current modified baljeet scale: 1-2       Stroke Summary per Inpatient Stroke Team   MRI/Head CT Acute/subacute appearing infarct in the posterior right frontal  lobe. No evidence of hemorrhagic transformation   Intracranial Vasculature Mild-to-moderate stenoses of the cavernous and supraclinoid  ICAs from atherosclerotic disease, . Moderate stenosis at the  origin of the left vertebral artery.   Cervical Vasculature No LVO   CT perfusion images suggests possible bandlike area of  ischemia/infarct in the posterior right frontal lobe region. This  would likely be of a distal right M3/M4 vessel which is not well  evaluated by this CTA.  Echocardiogram EF 55-60%, no regional wall motion abnormalities,moderate to severe aortic stenosis, normal left atrial size    EKG/Telemetry NSR w/ 1st degree AV block      LDL  9/5/2024: 115 mg/dL   A1C  9/5/2024: 6.5 %   Troponin 9/5/2024: 32 ng/L    - CT chest/abd/pelvis: No evidence of malignancy     Physical Exam:  /77   Pulse 81   SpO2 95%     General:  No acute distress  Cardiovascular: Normal rate.  Lung: Respirations are non-labored.  Extremities: Normal range of motion  Integumentary: Warm and dry     Neurologic:  Mental status : alert, fund of knowledge appropriate for visit     LANGUAGE: Speech fluent, very mild dysarthria      CN:  II- pupils equal and reactive, visual fields full  III, IV, VI- extraocular movements intact  V- sensation intact bilaterally  VII- Mild left lower facial weakness, Right sided synkinesis (lips pull up with right eye closure, right eye closure with smiling), mild right sided myokymia note, mild right lid lag   VIII- hearing intact, no nystagmus  IX, X- palate elevates  symmetrically  XI- sternocleidomastoid 5/5  XII- tongue midline     MOTOR : intact bulk and tone  5/5 strength except right shoulder abduction weakness 4     SENSORY : Intact to light touch in BUE and BLE      REFLEXES: Dodge absent     CEREBELLUM: finger to nose, finger taps, and heel to shin intact bilaterally      GAIT : Wider based waddling gait, slight limp on R     Cognition and Speech: Speech clear and coherent.     Psychiatric: Cooperative, Appropriate mood & affect.     Assessment/Plan:   Mike Schultz is a 74 year old year old male who presents for follow-up concerning stroke.       Right frontal stroke   - Etiology: Based on the location of the stroke, I would suspect that this is cardioembolic although can not say for certain and would be unusual to have a cardioembolic stroke while being compliant with Xarelto.  Discussed case with Dr. Rush in stroke neurology.  No studies have proven superiority of Watchman device over DOAC.  Does have other sources for embolism, including intracranial and cervical vasculature atherosclerosis as well significant atherosclerosis of the aortic valve. Will add ASA 81 mg for this.   - Continue Xarelto for secondary stroke prevention, add ASA 81 mg daily      Hypertension  - Goal BP is <130/80 with tighter control associated with improved vascular outcomes.   - Recommend home blood pressure monitoring and keeping a BP log for primary care follow up     Hyperlipidemia  -  -->75 (goal 40-70); continue Lipitor 20 mg with ongoing outpatient titration to achieve LDL goal      Diabetes   - Recent A1c was 6.5%, (goal <7% for secondary stroke prevention), follow-up with PCP     BOWEN  - Continue nightly use of CPAP  - Reviewed that untreated sleep apnea is an independent risk factors for stroke     Right arm weakness  - Patient had isolated right shoulder abduction (deltoid) weakness. Patient initially not sure when this started or even aware of it but then reported  that he had multiple injuries to his shoulder over 10 years ago which caused right shoulder weakness.  Reportedly had some procedure done and recommended surgery as likely to have issues with weakness in that shoulder again?  History is a little confusing - possible axillary nerve damage? However, patient assures me this is not new    Follow-up in 6 months      I spent 47 minutes providing care for this patient, including reviewing imaging, labs, and notes as well as providing counseling and coordination of care for the above issues.

## 2025-04-08 ENCOUNTER — TELEPHONE (OUTPATIENT)
Dept: CARDIOLOGY | Facility: CLINIC | Age: 75
End: 2025-04-08

## 2025-04-08 ENCOUNTER — ANCILLARY PROCEDURE (OUTPATIENT)
Dept: CARDIOLOGY | Facility: CLINIC | Age: 75
End: 2025-04-08
Attending: INTERNAL MEDICINE
Payer: COMMERCIAL

## 2025-04-08 DIAGNOSIS — Z95.0 CARDIAC PACEMAKER IN SITU: ICD-10-CM

## 2025-04-08 LAB
MDC_IDC_EPISODE_DTM: NORMAL
MDC_IDC_EPISODE_DTM: NORMAL
MDC_IDC_EPISODE_DURATION: 0 S
MDC_IDC_EPISODE_DURATION: 8 S
MDC_IDC_EPISODE_ID: 2
MDC_IDC_EPISODE_ID: 3
MDC_IDC_EPISODE_TYPE: NORMAL
MDC_IDC_EPISODE_TYPE: NORMAL
MDC_IDC_EPISODE_TYPE_INDUCED: NO
MDC_IDC_EPISODE_TYPE_INDUCED: NO
MDC_IDC_LEAD_CONNECTION_STATUS: NORMAL
MDC_IDC_LEAD_CONNECTION_STATUS: NORMAL
MDC_IDC_LEAD_IMPLANT_DT: NORMAL
MDC_IDC_LEAD_IMPLANT_DT: NORMAL
MDC_IDC_LEAD_LOCATION: NORMAL
MDC_IDC_LEAD_LOCATION: NORMAL
MDC_IDC_LEAD_LOCATION_DETAIL_1: NORMAL
MDC_IDC_LEAD_LOCATION_DETAIL_1: NORMAL
MDC_IDC_LEAD_MFG: NORMAL
MDC_IDC_LEAD_MFG: NORMAL
MDC_IDC_LEAD_MODEL: NORMAL
MDC_IDC_LEAD_MODEL: NORMAL
MDC_IDC_LEAD_POLARITY_TYPE: NORMAL
MDC_IDC_LEAD_POLARITY_TYPE: NORMAL
MDC_IDC_LEAD_SERIAL: NORMAL
MDC_IDC_LEAD_SERIAL: NORMAL
MDC_IDC_MSMT_BATTERY_DTM: NORMAL
MDC_IDC_MSMT_BATTERY_REMAINING_LONGEVITY: 154 MO
MDC_IDC_MSMT_BATTERY_RRT_TRIGGER: 2.62
MDC_IDC_MSMT_BATTERY_STATUS: NORMAL
MDC_IDC_MSMT_BATTERY_VOLTAGE: 3.21 V
MDC_IDC_MSMT_LEADCHNL_RA_IMPEDANCE_VALUE: 342 OHM
MDC_IDC_MSMT_LEADCHNL_RA_IMPEDANCE_VALUE: 608 OHM
MDC_IDC_MSMT_LEADCHNL_RA_SENSING_INTR_AMPL: 0.75 MV
MDC_IDC_MSMT_LEADCHNL_RA_SENSING_INTR_AMPL: 1.5 MV
MDC_IDC_MSMT_LEADCHNL_RV_IMPEDANCE_VALUE: 418 OHM
MDC_IDC_MSMT_LEADCHNL_RV_IMPEDANCE_VALUE: 551 OHM
MDC_IDC_MSMT_LEADCHNL_RV_PACING_THRESHOLD_AMPLITUDE: 0.62 V
MDC_IDC_MSMT_LEADCHNL_RV_PACING_THRESHOLD_PULSEWIDTH: 0.4 MS
MDC_IDC_MSMT_LEADCHNL_RV_SENSING_INTR_AMPL: 13.5 MV
MDC_IDC_MSMT_LEADCHNL_RV_SENSING_INTR_AMPL: 4.25 MV
MDC_IDC_PG_IMPLANT_DTM: NORMAL
MDC_IDC_PG_MFG: NORMAL
MDC_IDC_PG_MODEL: NORMAL
MDC_IDC_PG_SERIAL: NORMAL
MDC_IDC_PG_TYPE: NORMAL
MDC_IDC_SESS_CLINIC_NAME: NORMAL
MDC_IDC_SESS_DTM: NORMAL
MDC_IDC_SESS_TYPE: NORMAL
MDC_IDC_SET_BRADY_AT_MODE_SWITCH_RATE: 171 {BEATS}/MIN
MDC_IDC_SET_BRADY_HYSTRATE: NORMAL
MDC_IDC_SET_BRADY_LOWRATE: 60 {BEATS}/MIN
MDC_IDC_SET_BRADY_MAX_SENSOR_RATE: 130 {BEATS}/MIN
MDC_IDC_SET_BRADY_MAX_TRACKING_RATE: 130 {BEATS}/MIN
MDC_IDC_SET_BRADY_MODE: NORMAL
MDC_IDC_SET_BRADY_PAV_DELAY_LOW: 180 MS
MDC_IDC_SET_BRADY_SAV_DELAY_LOW: 150 MS
MDC_IDC_SET_LEADCHNL_RA_PACING_AMPLITUDE: 2.25 V
MDC_IDC_SET_LEADCHNL_RA_PACING_ANODE_ELECTRODE_1: NORMAL
MDC_IDC_SET_LEADCHNL_RA_PACING_ANODE_LOCATION_1: NORMAL
MDC_IDC_SET_LEADCHNL_RA_PACING_CAPTURE_MODE: NORMAL
MDC_IDC_SET_LEADCHNL_RA_PACING_CATHODE_ELECTRODE_1: NORMAL
MDC_IDC_SET_LEADCHNL_RA_PACING_CATHODE_LOCATION_1: NORMAL
MDC_IDC_SET_LEADCHNL_RA_PACING_POLARITY: NORMAL
MDC_IDC_SET_LEADCHNL_RA_PACING_PULSEWIDTH: 0.4 MS
MDC_IDC_SET_LEADCHNL_RA_SENSING_ANODE_ELECTRODE_1: NORMAL
MDC_IDC_SET_LEADCHNL_RA_SENSING_ANODE_LOCATION_1: NORMAL
MDC_IDC_SET_LEADCHNL_RA_SENSING_CATHODE_ELECTRODE_1: NORMAL
MDC_IDC_SET_LEADCHNL_RA_SENSING_CATHODE_LOCATION_1: NORMAL
MDC_IDC_SET_LEADCHNL_RA_SENSING_POLARITY: NORMAL
MDC_IDC_SET_LEADCHNL_RA_SENSING_SENSITIVITY: 0.15 MV
MDC_IDC_SET_LEADCHNL_RV_PACING_AMPLITUDE: 2 V
MDC_IDC_SET_LEADCHNL_RV_PACING_ANODE_ELECTRODE_1: NORMAL
MDC_IDC_SET_LEADCHNL_RV_PACING_ANODE_LOCATION_1: NORMAL
MDC_IDC_SET_LEADCHNL_RV_PACING_CAPTURE_MODE: NORMAL
MDC_IDC_SET_LEADCHNL_RV_PACING_CATHODE_ELECTRODE_1: NORMAL
MDC_IDC_SET_LEADCHNL_RV_PACING_CATHODE_LOCATION_1: NORMAL
MDC_IDC_SET_LEADCHNL_RV_PACING_POLARITY: NORMAL
MDC_IDC_SET_LEADCHNL_RV_PACING_PULSEWIDTH: 0.4 MS
MDC_IDC_SET_LEADCHNL_RV_SENSING_ANODE_ELECTRODE_1: NORMAL
MDC_IDC_SET_LEADCHNL_RV_SENSING_ANODE_LOCATION_1: NORMAL
MDC_IDC_SET_LEADCHNL_RV_SENSING_CATHODE_ELECTRODE_1: NORMAL
MDC_IDC_SET_LEADCHNL_RV_SENSING_CATHODE_LOCATION_1: NORMAL
MDC_IDC_SET_LEADCHNL_RV_SENSING_POLARITY: NORMAL
MDC_IDC_SET_LEADCHNL_RV_SENSING_SENSITIVITY: 0.9 MV
MDC_IDC_SET_ZONE_DETECTION_INTERVAL: 350 MS
MDC_IDC_SET_ZONE_DETECTION_INTERVAL: 400 MS
MDC_IDC_SET_ZONE_STATUS: NORMAL
MDC_IDC_SET_ZONE_STATUS: NORMAL
MDC_IDC_SET_ZONE_TYPE: NORMAL
MDC_IDC_SET_ZONE_VENDOR_TYPE: NORMAL
MDC_IDC_STAT_AT_BURDEN_PERCENT: 100 %
MDC_IDC_STAT_AT_DTM_END: NORMAL
MDC_IDC_STAT_AT_DTM_START: NORMAL
MDC_IDC_STAT_BRADY_DTM_END: NORMAL
MDC_IDC_STAT_BRADY_DTM_START: NORMAL
MDC_IDC_STAT_BRADY_RA_PERCENT_PACED: 0 %
MDC_IDC_STAT_BRADY_RV_PERCENT_PACED: 96.72 %
MDC_IDC_STAT_EPISODE_RECENT_COUNT: 0
MDC_IDC_STAT_EPISODE_RECENT_COUNT: 1
MDC_IDC_STAT_EPISODE_RECENT_COUNT_DTM_END: NORMAL
MDC_IDC_STAT_EPISODE_RECENT_COUNT_DTM_START: NORMAL
MDC_IDC_STAT_EPISODE_TOTAL_COUNT: 0
MDC_IDC_STAT_EPISODE_TOTAL_COUNT: 1
MDC_IDC_STAT_EPISODE_TOTAL_COUNT: 1
MDC_IDC_STAT_EPISODE_TOTAL_COUNT_DTM_END: NORMAL
MDC_IDC_STAT_EPISODE_TOTAL_COUNT_DTM_START: NORMAL
MDC_IDC_STAT_EPISODE_TYPE: NORMAL
MDC_IDC_STAT_TACHYTHERAPY_RECENT_DTM_END: NORMAL
MDC_IDC_STAT_TACHYTHERAPY_RECENT_DTM_START: NORMAL
MDC_IDC_STAT_TACHYTHERAPY_TOTAL_DTM_END: NORMAL
MDC_IDC_STAT_TACHYTHERAPY_TOTAL_DTM_START: NORMAL

## 2025-04-08 PROCEDURE — 93280 PM DEVICE PROGR EVAL DUAL: CPT | Performed by: INTERNAL MEDICINE

## 2025-04-08 NOTE — TELEPHONE ENCOUNTER
Pt had a routine [employer] [id number] Payne XT DR BURCH (D) 6 week Pacemaker Device Check    Concern today is 1 NSVT noted.     Underlying Rhythm: Afib with CHB achieved by AVNA and junctional escape at VVI 45.   AP: 0 %    : 96.72 % (Left BB pacing)     Last EF on 1/24/25 55-60%      EGM showing 8s run of NSVT (no starting point noted) with V-rates in the 160's lasting 8s.  Episode occurred 3/9/25 at 3:38am.  Pt does not recall any symptoms and stated that he might have been sleeping.         Pt is not on beta-blocker. Will route to Dr. Day for further recommendations.

## 2025-05-02 PROBLEM — N18.31 STAGE 3A CHRONIC KIDNEY DISEASE (H): Status: RESOLVED | Noted: 2024-08-14 | Resolved: 2025-05-02

## 2025-05-02 PROBLEM — I50.32 CHRONIC HEART FAILURE WITH PRESERVED EJECTION FRACTION (H): Status: ACTIVE | Noted: 2025-05-02

## 2025-05-02 PROBLEM — N18.31 STAGE 3A CHRONIC KIDNEY DISEASE (H): Status: ACTIVE | Noted: 2024-08-14

## 2025-05-02 PROBLEM — I69.354 HEMIPLEGIA AND HEMIPARESIS FOLLOWING CEREBRAL INFARCTION AFFECTING LEFT NON-DOMINANT SIDE (H): Status: ACTIVE | Noted: 2025-05-02

## 2025-05-02 PROBLEM — N18.32 STAGE 3B CHRONIC KIDNEY DISEASE (H): Status: ACTIVE | Noted: 2025-05-02

## 2025-05-02 PROBLEM — J96.01 ACUTE RESPIRATORY FAILURE WITH HYPOXIA (H): Status: RESOLVED | Noted: 2024-05-05 | Resolved: 2025-05-02

## 2025-05-06 ENCOUNTER — LAB (OUTPATIENT)
Dept: LAB | Facility: CLINIC | Age: 75
End: 2025-05-06
Payer: COMMERCIAL

## 2025-05-06 DIAGNOSIS — E11.65 TYPE 2 DIABETES MELLITUS WITH HYPERGLYCEMIA, WITHOUT LONG-TERM CURRENT USE OF INSULIN (H): ICD-10-CM

## 2025-05-06 LAB
ALBUMIN SERPL BCG-MCNC: 3.9 G/DL (ref 3.5–5.2)
ALP SERPL-CCNC: 145 U/L (ref 40–150)
ALT SERPL W P-5'-P-CCNC: 13 U/L (ref 0–70)
ANION GAP SERPL CALCULATED.3IONS-SCNC: 11 MMOL/L (ref 7–15)
AST SERPL W P-5'-P-CCNC: 26 U/L (ref 0–45)
BILIRUB SERPL-MCNC: 1 MG/DL
BUN SERPL-MCNC: 14.3 MG/DL (ref 8–23)
CALCIUM SERPL-MCNC: 8.6 MG/DL (ref 8.8–10.4)
CHLORIDE SERPL-SCNC: 101 MMOL/L (ref 98–107)
CREAT SERPL-MCNC: 1.21 MG/DL (ref 0.67–1.17)
EGFRCR SERPLBLD CKD-EPI 2021: 63 ML/MIN/1.73M2
EST. AVERAGE GLUCOSE BLD GHB EST-MCNC: 117 MG/DL
GLUCOSE SERPL-MCNC: 98 MG/DL (ref 70–99)
HBA1C MFR BLD: 5.7 % (ref 0–5.6)
HCO3 SERPL-SCNC: 27 MMOL/L (ref 22–29)
POTASSIUM SERPL-SCNC: 4.3 MMOL/L (ref 3.4–5.3)
PROT SERPL-MCNC: 6.9 G/DL (ref 6.4–8.3)
SODIUM SERPL-SCNC: 139 MMOL/L (ref 135–145)

## 2025-05-06 PROCEDURE — 36415 COLL VENOUS BLD VENIPUNCTURE: CPT

## 2025-05-06 PROCEDURE — 83036 HEMOGLOBIN GLYCOSYLATED A1C: CPT

## 2025-05-06 PROCEDURE — 80053 COMPREHEN METABOLIC PANEL: CPT

## 2025-05-08 ENCOUNTER — RESULTS FOLLOW-UP (OUTPATIENT)
Dept: INTERNAL MEDICINE | Facility: CLINIC | Age: 75
End: 2025-05-08

## 2025-05-19 ENCOUNTER — OFFICE VISIT (OUTPATIENT)
Dept: URGENT CARE | Facility: URGENT CARE | Age: 75
End: 2025-05-19
Payer: COMMERCIAL

## 2025-05-19 VITALS
SYSTOLIC BLOOD PRESSURE: 132 MMHG | HEART RATE: 80 BPM | RESPIRATION RATE: 20 BRPM | WEIGHT: 287 LBS | TEMPERATURE: 96.8 F | BODY MASS INDEX: 43.64 KG/M2 | OXYGEN SATURATION: 95 % | DIASTOLIC BLOOD PRESSURE: 92 MMHG

## 2025-05-19 DIAGNOSIS — R05.8 PRODUCTIVE COUGH: Primary | ICD-10-CM

## 2025-05-19 PROCEDURE — 99213 OFFICE O/P EST LOW 20 MIN: CPT | Performed by: FAMILY MEDICINE

## 2025-05-19 PROCEDURE — 3075F SYST BP GE 130 - 139MM HG: CPT | Performed by: FAMILY MEDICINE

## 2025-05-19 PROCEDURE — 3080F DIAST BP >= 90 MM HG: CPT | Performed by: FAMILY MEDICINE

## 2025-05-19 RX ORDER — BENZONATATE 200 MG/1
200 CAPSULE ORAL 3 TIMES DAILY PRN
Qty: 21 CAPSULE | Refills: 0 | Status: SHIPPED | OUTPATIENT
Start: 2025-05-19 | End: 2025-05-26

## 2025-05-19 RX ORDER — DOXYCYCLINE 100 MG/1
100 TABLET ORAL 2 TIMES DAILY
Qty: 14 TABLET | Refills: 0 | Status: SHIPPED | OUTPATIENT
Start: 2025-05-19 | End: 2025-05-26

## 2025-05-19 NOTE — PROGRESS NOTES
Urgent Care Clinic Visit    Chief Complaint   Patient presents with    URI     Pt states he has been having congestion in his chest for around 3 weeks-gets worse at night and cannot get the mucus up               5/19/2025    10:12 AM   Additional Questions   Roomed by Ky   Accompanied by Self

## 2025-05-19 NOTE — PROGRESS NOTES
"SUBJECTIVE: Mike Schultz is a 74 year old male presenting with a chief complaint of \"cold symptoms\" and cough .  Onset of symptoms was 10 day(s) ago.  Predisposing factors include None.    Past Medical History:   Diagnosis Date    (HFpEF) heart failure with preserved ejection fraction (H)     Acute gouty arthritis     Alcohol use disorder in remission     Amiodarone pulmonary toxicity     Aortic stenosis     Basal cell carcinoma     Of the nose    Bell's palsy     Herpes simplex without mention of complication     Hyperlipidemia     Hypertension     Persistent atrial fibrillation (H)     persistent, DCCV 2017, ablation 17    Pulmonary nodules     Sleep apnea     CPAP     Allergies   Allergen Reactions    Amiodarone Shortness Of Breath     Suspected amiodarone toxicity involving lungs    Sotalol      Social History     Tobacco Use    Smoking status: Former     Current packs/day: 0.00     Average packs/day: 1 pack/day for 34.6 years (34.6 ttl pk-yrs)     Types: Cigarettes     Start date:      Quit date: 2011     Years since quittin.7     Passive exposure: Past    Smokeless tobacco: Never    Tobacco comments:     quit    Substance Use Topics    Alcohol use: Not Currently     Comment: Quit 15 years ago       ROS:  SKIN: no rash  GI: no vomiting    OBJECTIVE:  BP (!) 132/92   Pulse 80   Temp 96.8  F (36  C) (Tympanic)   Resp 20   Wt 130.2 kg (287 lb)   SpO2 95%   BMI 43.64 kg/m  GENERAL APPEARANCE: healthy, alert and no distress  EYES: EOMI,  PERRL, conjunctiva clear  HENT: ear canals and TM's normal.  Nose and mouth without ulcers, erythema or lesions  RESP: lungs clear to auscultation - no rales, rhonchi or wheezes  SKIN: no suspicious lesions or rashes      ICD-10-CM    1. Productive cough  R05.8 doxycycline monohydrate (ADOXA) 100 MG tablet     benzonatate (TESSALON) 200 MG capsule          Fluids/Rest, f/u if worse/not any better    "

## 2025-05-20 ENCOUNTER — TELEPHONE (OUTPATIENT)
Dept: CARDIOLOGY | Facility: CLINIC | Age: 75
End: 2025-05-20
Payer: COMMERCIAL

## 2025-05-20 NOTE — TELEPHONE ENCOUNTER
Called to reschedule 6/4/25 w/ Dr. Muse to 6/5/25     Lvm and callback information    [phone]    Larissa Lozano  Structural Heart Procedure   [facility]/ [facility]

## 2025-06-05 ENCOUNTER — RESULTS FOLLOW-UP (OUTPATIENT)
Dept: CARDIOLOGY | Facility: CLINIC | Age: 75
End: 2025-06-05

## 2025-06-05 DIAGNOSIS — I35.0 SEVERE AORTIC STENOSIS: ICD-10-CM

## 2025-06-05 RX ORDER — AMOXICILLIN 500 MG/1
TABLET, FILM COATED ORAL
Qty: 4 TABLET | Refills: 11 | Status: SHIPPED | OUTPATIENT
Start: 2025-06-05

## 2025-06-09 ENCOUNTER — MYC MEDICAL ADVICE (OUTPATIENT)
Dept: CARDIOLOGY | Facility: CLINIC | Age: 75
End: 2025-06-09
Payer: COMMERCIAL

## 2025-06-10 ENCOUNTER — OFFICE VISIT (OUTPATIENT)
Dept: INTERNAL MEDICINE | Facility: CLINIC | Age: 75
End: 2025-06-10
Payer: COMMERCIAL

## 2025-06-10 VITALS
HEIGHT: 68 IN | SYSTOLIC BLOOD PRESSURE: 132 MMHG | DIASTOLIC BLOOD PRESSURE: 84 MMHG | BODY MASS INDEX: 41.74 KG/M2 | HEART RATE: 86 BPM | OXYGEN SATURATION: 97 % | RESPIRATION RATE: 16 BRPM | WEIGHT: 275.4 LBS

## 2025-06-10 DIAGNOSIS — Z01.818 ENCOUNTER FOR PREOPERATIVE ASSESSMENT: Primary | ICD-10-CM

## 2025-06-10 DIAGNOSIS — K00.9 DENTAL ANOMALY: ICD-10-CM

## 2025-06-10 PROCEDURE — 3075F SYST BP GE 130 - 139MM HG: CPT | Performed by: INTERNAL MEDICINE

## 2025-06-10 PROCEDURE — 3079F DIAST BP 80-89 MM HG: CPT | Performed by: INTERNAL MEDICINE

## 2025-06-10 PROCEDURE — 99214 OFFICE O/P EST MOD 30 MIN: CPT | Performed by: INTERNAL MEDICINE

## 2025-06-10 NOTE — PATIENT INSTRUCTIONS
- STOP Mounjaro until after surgery    - HOLD Xarelto for 3 days prior to surgery    - HOLD aspirin for 5 days prior to surgery    - The morning of the surgery, HOLD losartan    - The morning of the surgery, remember to take the amoxicillin 30-60 minutes before the operation

## 2025-06-10 NOTE — PROGRESS NOTES
"Preoperative Evaluation  [address] [address]  Phone: [phone]  Primary Provider: Ranjan Bustamante MD  Pre-op Performing Provider: Ranjan Bustamante MD  Cheo 10, 2025         6/10/2025   Surgical Information   What procedure is being done? pre op   Facility or Hospital where procedure[facility]surgery [facility] be performed[facility]   Who is doing the procedure / surgery? dr colton tristan and dr rock starkey   Date of surgery / procedure: Juneo 16 2025   Time of surgery / procedure: 10am dental implants   Where do you plan to recover after surgery? at home with family     Fax number for surgical facility: [phone]    Assessment & Plan   The proposed surgical procedure is considered INTERMEDIATE risk.    Encounter for preoperative assessment  Dental anomaly  Approval given to proceed with proposed procedure without further diagnostic evaluation. Most recent labs reviewed. He had a CMP and A1c done last month that were normal, and a CBC done in February 2025 that was normal/at goal. These ought to be sufficient to proceed with surgery. EKG performed last week during cardiology visit. See note below. Medications were reviewed in detail today. Instructions are as follows:  - STOP Mounjaro until after surgery    - HOLD Xarelto for 3 days prior to surgery    - HOLD aspirin for 5 days prior to surgery    - The morning of the surgery, HOLD losartan    - The morning of the surgery, remember to take the amoxicillin 30-60 minutes before the operation    Resume all medications post-operatively at the same doses unless otherwise directed by surgical care team.    From patient's cardiologist last week:  \"Needs cardiac clearance for dental surgery.  I see no problems with proceeding from a cardiac standpoint. Would stop Xarelto a minimum of 3 days prior to dental surgery and no ASA 4-5 days prior.  Also will need antibiotic prophylaxis prior to dental surgery.  Needs note " "faxed to [phone] to Dr. Dr. Gómez Pathak at [facility].\"    Recommendation  Approval given to proceed with proposed procedure, without further diagnostic evaluation.    Signed Electronically by:  Ranjan Bustamante MD, MPH  [facility]  Internal Medicine    The longitudinal plan of care for the diagnosis(es)/condition(s) as documented were addressed during this visit. Due to the added complexity in care, I will continue to support Brayden in the subsequent management and with ongoing continuity of care.    Subjective   Brayden is a 74 year old presenting for the following: Pre-Op Exam    HPI: Brayden presents for a pre-op exam. He recently saw his cardiology team who noted: \"Needs cardiac clearance for dental surgery.  I see no problems with proceeding from a cardiac standpoint. Would stop Xarelto a minimum of 3 days prior to dental surgery and no ASA 4-5 days prior.  Also will need antibiotic prophylaxis prior to dental surgery.  Needs note faxed to [phone] to Dr. Dr. Gómez Pathak at [facility].\"        6/10/2025   Pre-Op Questionnaire   Have you ever had a heart attack or stroke? (!) YES    Have you ever had surgery on your heart or blood vessels, such as a stent placement, a coronary artery bypass, or surgery on an artery in your head, neck, heart, or legs? (!) YES    Do you have chest pain with activity? No   Do you have a history of heart failure? No   Do you currently have a cold, bronchitis or symptoms of other infection? No   Do you have a cough, shortness of breath, or wheezing? No   Do you or anyone in your family have previous history of blood clots? No   Do you or does anyone in your family have a serious bleeding problem such as prolonged bleeding following surgeries or cuts? No   Have you ever had problems with anemia or been told to take iron pills? No   Have you had any abnormal blood loss such as black, tarry or bloody stools? No "   Have you ever had a blood transfusion? No   Are you willing to have a blood transfusion if it is medically needed before, during, or after your surgery? Yes   Have you or any of your relatives ever had problems with anesthesia? No   Do you have sleep apnea, excessive snoring or daytime drowsiness? (!) YES   Do you have a CPAP machine? Yes   Do you have any artifical heart valves or other implanted medical devices like a pacemaker, defibrillator, or continuous glucose monitor? (!) YES   What type of device do you have? pace maker aortic valve   Name of the clinic that manages your device [facility]   Do you have artificial joints? (!) YES   Are you allergic to latex? No     Advance Care Planning - Not on file    Preoperative Review of    reviewed - Norco script May 2025    Patient Active Problem List    Diagnosis Date Noted    Chronic heart failure with preserved ejection fraction (H) 05/02/2025     Priority: Medium    Cardiac pacemaker in situ 02/12/2025     Priority: Medium    Hx of atrioventricular node ablation 02/12/2025     Priority: Medium    Aortic stenosis, severe 12/10/2024     Priority: Medium    Status post coronary angiogram 11/22/2024     Priority: Medium    Nonrheumatic aortic (valve) stenosis 11/22/2024     Priority: Medium    Diabetes mellitus, type 2 (H) 09/12/2024     Priority: Medium    Facial droop 09/05/2024     Priority: Medium    Dysarthria 09/05/2024     Priority: Medium    Acute CVA (cerebrovascular accident) (H) 09/05/2024     Priority: Medium    Other cardiomyopathies (H) 02/23/2024     Priority: Medium    Bilateral carpal tunnel syndrome 10/21/2020     Priority: Medium    Venous insufficiency 03/02/2018     Priority: Medium    Ascending aorta dilatation 12/23/2016     Priority: Medium     Aortic root dilatation      BOWEN (obstructive sleep apnea) 09/30/2016     Priority: Medium     Split Night:  Sleep Study 9/20/2016 - (274.0 lbs) AHI 74.1, RDI 74.1, Supine AHI 79.1, REM AHI  43.6, Low O2% 61.6%, Time Spent <=88% 106.8, Time Spent <=89% 129.4, CPAP was titrated to a pressure of 15 with an AHI 15.6. Time spent in REM supine at this pressure was - minutes.      Family history of ischemic heart disease 01/05/2015     Priority: Medium     Father MI 42, brother CABG mid 50's  Patient with perfusion study 7/2012      Gout 01/05/2015     Priority: Medium     Many episodes in right foot.   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  Often related to alcohol intake      Atrial fibrillation, permanent (H)      Priority: Medium     Cardiologist Dr. Yates  DCCV 12/2017, ablation 11/6/17  Echo 12/2014 - LVH, borderline LV size, CHARBEL, 1-2+ MR, borderline prox aorta size      Tobacco use disorder, moderate, in sustained remission      Priority: Medium     dced 6/2004      Class 3 severe obesity due to excess calories with serious comorbidity and body mass index (BMI) of 40.0 to 44.9 in adult (H)      Priority: Medium    Herpes simplex virus (HSV) infection      Priority: Medium     Problem list name updated by automated process. Provider to review      Allergic rhinitis 05/12/2005     Priority: Medium     Problem list name updated by automated process. Provider to review      Essential hypertension 04/12/2005     Priority: Medium    Impotence of organic origin 04/12/2005     Priority: Medium      Past Medical History:   Diagnosis Date    (HFpEF) heart failure with preserved ejection fraction (H)     Acute gouty arthritis     Alcohol use disorder in remission     Amiodarone pulmonary toxicity     Aortic stenosis     Basal cell carcinoma     Of the nose    Bell's palsy     Cerebral infarction (H) 10/24    October 2024    Congestive heart failure (H) 2020    Afib    Herpes simplex without mention of complication     Hyperlipidemia     Hypertension 2000    Persistent atrial fibrillation (H)     persistent, DCCV 12/2017, ablation 11/6/17    Pulmonary nodules     Sleep apnea     CPAP     Past Surgical History:   Procedure  Laterality Date    ABDOMEN SURGERY  2013    hernia operation    ABLATION OF DYSRHYTHMIC FOCUS  2018, 04/12/2019    Procedure: EP Ablation Focal AFIB;  Surgeon: Fady Day MD;  Location:  HEART CARDIAC CATH LAB    ACHILLES TENDON SURGERY  01/01/1997    ANESTHESIA CARDIOVERSION N/A 02/26/2019    Procedure: ANESTHESIA CARDIOVERSION (CONNER);  Surgeon: GENERIC ANESTHESIA PROVIDER;  Location:  OR    ANESTHESIA CARDIOVERSION N/A 11/19/2020    Procedure: ANESTHESIA, FOR CARDIOVERSION (dr campbell);  Surgeon: GENERIC ANESTHESIA PROVIDER;  Location:  OR    ANESTHESIA CARDIOVERSION N/A 08/22/2022    Procedure: CARDIOVERSION;  Surgeon: GENERIC ANESTHESIA PROVIDER;  Location:  OR    ANESTHESIA CARDIOVERSION N/A 09/13/2023    Procedure: Anesthesia cardioversion;  Surgeon: GENERIC ANESTHESIA PROVIDER;  Location:  OR    ANESTHESIA CARDIOVERSION N/A 10/06/2023    Procedure: Anesthesia cardioversion;  Surgeon: GENERIC ANESTHESIA PROVIDER;  Location:  OR    ANESTHESIA CARDIOVERSION N/A 08/01/2024    Procedure: Anesthesia cardioversion;  Surgeon: GENERIC ANESTHESIA PROVIDER;  Location:  OR    BASAL CELL CARCINOMA EXCISION Right 01/01/2009    nose, took cartilage from inner ear.    CARDIAC SURGERY  2013    a fib    CARDIOVERSION  2012, 2019    cardioversion for atr fib    COLONOSCOPY  01/01/2008    COLONOSCOPY N/A 03/22/2023    Procedure: COLONOSCOPY, FLEXIBLE, WITH LESION REMOVAL USING SNARE;  Surgeon: Anne Lucero MD;  Location:  GI    CV CORONARY ANGIOGRAM N/A 11/22/2024    Procedure: Coronary Angiogram;  Surgeon: Murphy Mccann MD;  Location:  HEART CARDIAC CATH LAB    CV CORONARY ANGIOGRAM N/A 12/10/2024    Procedure: Coronary Angiogram;  Surgeon: Ricky Muse MD;  Location:  HEART CARDIAC CATH LAB    CV RIGHT HEART CATH MEASUREMENTS RECORDED N/A 07/31/2024    Procedure: Right Heart Cath;  Surgeon: Adriano Lazo MD;  Location:  HEART CARDIAC CATH LAB    CV RIGHT HEART CATH  MEASUREMENTS RECORDED N/A 11/22/2024    Procedure: Right Heart Catheterization;  Surgeon: Murphy Mccann MD;  Location: [facility] CARDIAC CATH LAB    CV TRANSCATHETER AORTIC VALVE REPLACEMENT-FEMORAL APPROACH N/A 12/10/2024    Procedure: Transcatheter Aortic Valve Replacement-Femoral Approach;  Surgeon: Ricky Muse MD;  Location: [facility] CARDIAC CATH LAB    EP ABLATION AV NODE N/A 02/12/2025    Procedure: Ablation Atrioventricular Node;  Surgeon: Fady Day MD;  Location:  HEART CARDIAC CATH LAB    EP ABLATION FOCAL AFIB N/A 04/12/2019    Procedure: EP Ablation Focal AFIB;  Surgeon: Fady Day MD;  Location:  HEART CARDIAC CATH LAB    EP ABLATION FOCAL AFIB N/A 05/02/2024    Procedure: Ablation Atrial Fibrilation;  Surgeon: Fady Day MD;  Location: [facility] CARDIAC CATH LAB    EP PACEMAKER DEVICE & LEAD IMPLANT- RIGHT VENTRICULAR N/A 02/12/2025    Procedure: Pacemaker Device & Lead Implant- Right ventricular;  Surgeon: Fady Day MD;  Location: [facility] CARDIAC CATH LAB    HERNIA REPAIR, UMBILICAL  08/20/2012    Procedure: HERNIORRHAPHY UMBILICAL;  UMBILICAL HERNIA REPAIR;  Surgeon: Ra Crocker MD;  Location: [facility]    HERNIORRHAPHY UMBILICAL  08/20/2012    Procedure: HERNIORRHAPHY UMBILICAL;  UMBILICAL HERNIA REPAIR;  Surgeon: Ra Crocker MD;  Location: [facility]    ORTHOPEDIC SURGERY      ORTHOPEDIC SURGERY      OTHER SURGICAL HISTORY Right 03/01/2017    total KNEE ARTHROSCOPY     TOTAL KNEE ARTHROPLASTY Left 03/16/2018    [facility] NONSPECIFIC PROCEDURE  1997    achilles tendon    [facility] NONSPECIFIC PROCEDURE      knees, arthroscopy    [facility] NONSPECIFIC PROCEDURE      basal cell skin ca, nose    [facility] NONSPECIFIC PROCEDURE      flex sig; colonoscopy due 3/2008    Z NONSPECIFIC PROCEDURE  2012    cardioversion for atr fib    C TOTAL KNEE ARTHROPLASTY Left 03/16/2018    Dr. Malick Suarez     Current Outpatient Medications   Medication Sig Dispense Refill    albuterol  (PROAIR HFA/PROVENTIL HFA/VENTOLIN HFA) 108 (90 Base) MCG/ACT inhaler Inhale 2 puffs into the lungs every 6 hours as needed for shortness of breath, wheezing or cough. 18 g 11    allopurinol (ZYLOPRIM) 300 MG tablet TAKE ONE TABLET (300 MG) BY MOUTH ONE TIME DAILY 90 tablet 2    amoxicillin (AMOXIL) 500 MG tablet Please take 2000 mg (4 tablets) of amoxicillin 30-60 minutes prior to any dental work 4 tablet 11    amoxicillin-clavulanate (AUGMENTIN) 875-125 MG tablet TAKE 1 TABLET BY MOUTH TWICE DAILY UNTIL ALL TAKEN      atorvastatin (LIPITOR) 40 MG tablet Take 0.5 tablets (20 mg) by mouth every evening. 45 tablet 3    bumetanide (BUMEX) 1 MG tablet 2 mg am and 2 mg pm 360 tablet 1    cetirizine (ZYRTEC) 10 MG tablet Take 10 mg by mouth every evening      finasteride (PROPECIA) 1 MG tablet Take 1 tablet (1 mg) by mouth daily 90 tablet 2    losartan (COZAAR) 25 MG tablet Take 1 tablet (25 mg) by mouth daily. 90 tablet 3    potassium chloride pushpa ER (KLOR-CON M20) 20 MEQ CR tablet TAKE 3 TABLETS(60 MEQ) BY MOUTH TWICE DAILY 180 tablet 2    rivaroxaban ANTICOAGULANT (XARELTO ANTICOAGULANT) 20 MG TABS tablet Take 1 tablet (20 mg) by mouth daily (with dinner). 90 tablet 3    sulfamethoxazole-trimethoprim (BACTRIM DS) 800-160 MG tablet TAKE 1 TABLET BY MOUTH EVERY DAY WHILE ON STEROIDS      Tirzepatide (MOUNJARO) 7.5 MG/0.5ML SOAJ auto-injector pen Inject 0.5 mLs (7.5 mg) subcutaneously once a week. 2 mL 1    Vitamin D3 (CHOLECALCIFEROL) 125 MCG (5000 UT) tablet Take 125 mcg by mouth daily.       Allergies   Allergen Reactions    Amiodarone Shortness Of Breath     Suspected amiodarone toxicity involving lungs    Sotalol       Social History     Tobacco Use    Smoking status: Former     Current packs/day: 0.00     Average packs/day: 1 pack/day for 34.6 years (34.6 ttl pk-yrs)     Types: Cigarettes     Start date:      Quit date: 2011     Years since quittin.8     Passive exposure: Past    Smokeless tobacco:  "Never    Tobacco comments:     quit 2011   Substance Use Topics    Alcohol use: Yes     Comment: a few drinks per week     History   Drug Use Unknown     Objective    /84   Pulse 86   Resp 16   Ht 1.727 m (5' 8\")   Wt 124.9 kg (275 lb 6.4 oz)   SpO2 97%   BMI 41.87 kg/m     Estimated body mass index is 41.87 kg/m  as calculated from the following:    Height as of this encounter: 1.727 m (5' 8\").    Weight as of this encounter: 124.9 kg (275 lb 6.4 oz).    Physical Exam  GENERAL: alert and in no distress.  EYES: conjunctivae/corneas clear. EOMs grossly intact  HENT: Facies symmetric.  RESP: CTAB, no w/r/r.  CV: RRR, + murmur.  MSK: Moves all four extremities freely.  SKIN: No significant ulcers, lesions, or rashes on the visualized portions of the skin  NEURO: CN II-XII grossly intact.    Recent Labs   Lab Test 05/06/25  1057 02/12/25  1031 02/03/25  1040 01/24/25  1109 12/11/24  0527 12/05/24  1107 12/02/24  1000 11/22/24  0906   HGB  --  15.6  --  15.6   < > 14.9  --  15.1   PLT  --  186  --  195   < > 230  --  226   INR  --   --   --   --   --  1.89*  --  1.10    140 140 137   < > 139   < > 138   POTASSIUM 4.3 4.3 4.3 4.1   < > 4.5   < > 3.8   CR 1.21* 1.06 1.11 1.21*   < > 0.89   < > 0.96   A1C 5.7*  --  5.7*  --   --   --   --   --     < > = values in this interval not displayed.      Diagnostics  No labs were ordered during this visit.   No EKG this visit, completed in the last 90 days.    Revised Cardiac Risk Index (RCRI)  The patient has the following serious cardiovascular risks for perioperative complications:   - Coronary Artery Disease (MI, positive stress test, angina, Qs on EKG) = 1 point     RCRI Interpretation: 1 point: Class II (low risk - 0.9% complication rate)    Signed Electronically by: Ranjan Bustamante MD  A copy of this evaluation report is provided to the requesting physician.  "

## 2025-06-10 NOTE — TELEPHONE ENCOUNTER
[facility] staff called RN. Requesting angiogram results from 12/2024. Results faxed. [facility] anesthesiology will review results and determine if further stress testing is needed. Patient updated via [employer].

## 2025-06-11 NOTE — PROGRESS NOTES
Pre-op Physical electronically faxed at [phone] .  Right fax confirmed.     Tova Hartmann, TC

## 2025-06-13 DIAGNOSIS — E66.813 CLASS 3 SEVERE OBESITY DUE TO EXCESS CALORIES WITH SERIOUS COMORBIDITY AND BODY MASS INDEX (BMI) OF 40.0 TO 44.9 IN ADULT (H): ICD-10-CM

## 2025-06-13 DIAGNOSIS — E11.65 TYPE 2 DIABETES MELLITUS WITH HYPERGLYCEMIA, WITHOUT LONG-TERM CURRENT USE OF INSULIN (H): ICD-10-CM

## 2025-06-13 NOTE — TELEPHONE ENCOUNTER
Written script pended, routing to PCP for review.     Once signed if appropriate, please send to team so we can fax it to:    [email or web address] (mail order pharmacy)  [address], [place]     Phone #: [phone]   Fax #: [phone]     Thank you,  Ana Torres RN

## 2025-06-13 NOTE — TELEPHONE ENCOUNTER
Medication Refill    Rx pended  Pharmacy - not in [employer] system Fax Rx to [phone]    What medication are you calling about (include dose and sig)?:   Tirzepatide (MOUNJARO) 7.5 MG/0.5ML SOAJ auto-injector pen    Who is calling: Ze[employer], customer service, [employer], [place]    Preferred Pharmacy: WATCH FOR INCOMING FAX  [facility] (mail order pharmacy)  [address], [place]    Phone #: [phone]   Fax #: [phone]     Spoke with Zeus, customer service    Controlled Substance Agreement on file:   CSA -- Patient Level:    CSA: None found at the patient level.     Who prescribed the medication?: Ranjan Courtney MD     Do you need a refill? Yes, needs Rx by end of the month    Wilma Vazquez on 6/13/2025 at 4:03 PM

## 2025-06-17 NOTE — TELEPHONE ENCOUNTER
Left message for patient to call back and let us know if he wants to  his script or if he wants it mailed to him since we are not allowed to fax to [facility].

## 2025-07-17 DIAGNOSIS — E87.6 HYPOKALEMIA: ICD-10-CM

## 2025-07-17 DIAGNOSIS — I10 BENIGN ESSENTIAL HYPERTENSION: ICD-10-CM

## 2025-07-17 RX ORDER — POTASSIUM CHLORIDE 1500 MG/1
TABLET, EXTENDED RELEASE ORAL
Qty: 270 TABLET | Refills: 1 | Status: SHIPPED | OUTPATIENT
Start: 2025-07-17

## 2025-08-10 ENCOUNTER — MYC MEDICAL ADVICE (OUTPATIENT)
Dept: INTERNAL MEDICINE | Facility: CLINIC | Age: 75
End: 2025-08-10
Payer: COMMERCIAL

## 2025-08-10 DIAGNOSIS — M10.9 GOUT OF FOOT, UNSPECIFIED CAUSE, UNSPECIFIED CHRONICITY, UNSPECIFIED LATERALITY: ICD-10-CM

## 2025-08-10 DIAGNOSIS — E11.65 TYPE 2 DIABETES MELLITUS WITH HYPERGLYCEMIA, WITHOUT LONG-TERM CURRENT USE OF INSULIN (H): Primary | ICD-10-CM

## 2025-08-16 ENCOUNTER — HEALTH MAINTENANCE LETTER (OUTPATIENT)
Age: 75
End: 2025-08-16

## 2025-08-18 ENCOUNTER — TELEPHONE (OUTPATIENT)
Dept: INTERNAL MEDICINE | Facility: CLINIC | Age: 75
End: 2025-08-18
Payer: COMMERCIAL

## 2025-08-19 DIAGNOSIS — E11.65 TYPE 2 DIABETES MELLITUS WITH HYPERGLYCEMIA, WITHOUT LONG-TERM CURRENT USE OF INSULIN (H): ICD-10-CM

## 2025-08-19 DIAGNOSIS — E66.813 CLASS 3 SEVERE OBESITY DUE TO EXCESS CALORIES WITH SERIOUS COMORBIDITY AND BODY MASS INDEX (BMI) OF 40.0 TO 44.9 IN ADULT (H): ICD-10-CM

## (undated) DEVICE — RAD INTRODUCER KIT MICRO 5FRX10CM .018 NITINOL G/W

## (undated) DEVICE — KIT HAND CONTROL ANGIOTOUCH ACIST 65CM AT-P65

## (undated) DEVICE — CATH SOUNDSTAR 8FRX90CM 10439011

## (undated) DEVICE — CATH RF CARD ABL BID JJ THERMO

## (undated) DEVICE — RAD CLOSURE ANGIOSEAL 8FR  610131

## (undated) DEVICE — ESU PENCIL SMOKE EVAC W/ROCKER SWITCH 0703-047-000

## (undated) DEVICE — INTRODUCER SHEATH GREEN 6.5FRX11CM .038IN PSI-6F-11-038ACT

## (undated) DEVICE — TUBE SET SMARKABLATE IRRIGATION

## (undated) DEVICE — CATH DECCPOLAR LASSO 2515 NAV

## (undated) DEVICE — GUIDEWIRE VASC SAFARI2 0.035X275CM H74939406XS1

## (undated) DEVICE — CABLE PACING ALLIGATOR CLIP 12FT 5833SL

## (undated) DEVICE — 9CM X 15CM, AQUACEL AG ADVANTAGE SURGICAL COVER DRESSING

## (undated) DEVICE — CATH EP 6FR 2MM TIP 2-8-2 115C

## (undated) DEVICE — CATH ANGIO INFINITI 3DRC 6FRX100CM 534676T

## (undated) DEVICE — CLOSURE DEVICE 6FR VASC PROGLIDE MEDICATED SUTURE 12673-03

## (undated) DEVICE — MANIFOLD KIT ANGIO AUTOMATED 014613

## (undated) DEVICE — Device

## (undated) DEVICE — INTRO SHEATH 7FRX10CM PINNACLE RSS702

## (undated) DEVICE — CATH ABLTN 7FR 1-7-4MM SPACE 115 CML 4MML STRL LF BD7TCFJ4L

## (undated) DEVICE — PATCH CARTO 3 EXTERNAL REFERENCE 3D MAPPING CREFP6

## (undated) DEVICE — CABLE PCNG 12FT REMINGTON PACI

## (undated) DEVICE — CATH EP PACEL 5FRX110CM 1MM RIGHT HEART CURVE 401763

## (undated) DEVICE — SHEATH PRELUDE SNAP 7FRX13CM PLS-1007

## (undated) DEVICE — NEEDLE TRANSSEPTAL 18GA X 98CM 86 DEG

## (undated) DEVICE — DEFIB PRO-PADZ LVP LQD GEL ADULT 8900-2105-01

## (undated) DEVICE — INTRO CATH 12CM 8.5FR FST-CATH

## (undated) DEVICE — INTRO TERUMO 7FRX25CM W/MARKER RSB703

## (undated) DEVICE — INTRODUCER SAFESHEATH II LONG 7FR 23CM SSL7

## (undated) DEVICE — CATH EP 7FR X 115CM DECANAV CA

## (undated) DEVICE — WIRE GUIDE LUNDERQUIST 0.035"X260CM DBL CVD

## (undated) DEVICE — CATH NAV PENTARAY F CURVE

## (undated) DEVICE — IMPLANTABLE DEVICE: Type: IMPLANTABLE DEVICE | Status: NON-FUNCTIONAL

## (undated) DEVICE — CATH ANGIO SUPERTORQUE AL1 6FRX100CM 532-645

## (undated) DEVICE — SLITTER ADJSTBL 6232ADJ

## (undated) DEVICE — SYSTEM DELIVERY EVOLUT FX LOAD 23-29 MM L-EVOLUTFX-2329

## (undated) DEVICE — CATHETER SYST EVOLUT FX DELIVERY SHEATH 18F D-EVOLUTFX-34

## (undated) DEVICE — CATH ANGIO JUDKINS JL4 6FRX100CM INFINITI 534620T

## (undated) DEVICE — TOTE ANGIO CORP PC15AT SAN32CC83O

## (undated) DEVICE — INTRODUCER SHEATH FAST-CATH 9FRX12CM 406116

## (undated) DEVICE — SYSTEM PANNUS RETENTION 4 PAD 2 STRAP CZ-PRS-04

## (undated) DEVICE — INTRODUCER SHEATH VASC CATH 8.5FR CARTO GIDE STH MED D138502

## (undated) DEVICE — GUIDEWIRE VASC 0.035INX150CM INQWIRE J TIP IQ35F150J3F/A

## (undated) DEVICE — INTRODUCER CATH VASC 5FRX10CM  MPIS-501-NT-U-SST

## (undated) DEVICE — NDL TRNSEPT 18GA X 71CM 86 DEG

## (undated) DEVICE — SYSTEM LOADING EVOLUT FX 34 L-EVOLUTFX-34

## (undated) DEVICE — INTRO SHEATH 6FRX10CM PINNACLE RSS602

## (undated) DEVICE — WIRE GUIDE 0.035"X260CM SAFE-T-J EXCHANGE G00517

## (undated) DEVICE — INTRO TERUMO 6FRX25CM W/MARKER RSB603

## (undated) DEVICE — CATH ANGIO INFINITI PIGTAIL 145 6 SH 6FRX110CM  534-652S

## (undated) DEVICE — PACK PCMKR PERM SRG PROC LF SAN32PC573

## (undated) DEVICE — SYSTEM DELIVERY EVOLUT FX 23-29 MM D-EVOLUTFX-2329

## (undated) DEVICE — CATH GD 5.4FR ID / 7FR OD X 43

## (undated) DEVICE — CATH BALLOON TRUE DILATION VALVULOPLASTY 12FR 22MMX4.5CM

## (undated) DEVICE — CATH EP CATH EP REPRO DAIG RSPN FX CRV DX EP C

## (undated) DEVICE — PACK EP SRG PROC LF DISP SAN32EPFSR

## (undated) DEVICE — WIRE GUIDE 0.035"X150CM EMERALD STR 502542

## (undated) DEVICE — INTRODUCER SHEATH FAST-CATH SWARTZ 8.5FRX63CM SL1 CVD 406849

## (undated) RX ORDER — FENTANYL CITRATE 50 UG/ML
INJECTION, SOLUTION INTRAMUSCULAR; INTRAVENOUS
Status: DISPENSED
Start: 2024-11-22

## (undated) RX ORDER — POTASSIUM CHLORIDE 1500 MG/1
TABLET, EXTENDED RELEASE ORAL
Status: DISPENSED
Start: 2024-07-24

## (undated) RX ORDER — FENTANYL CITRATE 50 UG/ML
INJECTION, SOLUTION INTRAMUSCULAR; INTRAVENOUS
Status: DISPENSED
Start: 2024-05-02

## (undated) RX ORDER — LIDOCAINE HYDROCHLORIDE 10 MG/ML
INJECTION, SOLUTION EPIDURAL; INFILTRATION; INTRACAUDAL; PERINEURAL
Status: DISPENSED
Start: 2024-11-22

## (undated) RX ORDER — HEPARIN SODIUM 200 [USP'U]/100ML
INJECTION, SOLUTION INTRAVENOUS
Status: DISPENSED
Start: 2024-05-02

## (undated) RX ORDER — MAGNESIUM SULFATE HEPTAHYDRATE 40 MG/ML
INJECTION, SOLUTION INTRAVENOUS
Status: DISPENSED
Start: 2023-10-06

## (undated) RX ORDER — BUMETANIDE 0.25 MG/ML
INJECTION INTRAMUSCULAR; INTRAVENOUS
Status: DISPENSED
Start: 2024-07-24

## (undated) RX ORDER — HEPARIN SODIUM 1000 [USP'U]/ML
INJECTION, SOLUTION INTRAVENOUS; SUBCUTANEOUS
Status: DISPENSED
Start: 2017-11-06

## (undated) RX ORDER — HEPARIN SODIUM 1000 [USP'U]/ML
INJECTION, SOLUTION INTRAVENOUS; SUBCUTANEOUS
Status: DISPENSED
Start: 2024-05-02

## (undated) RX ORDER — FENTANYL CITRATE 50 UG/ML
INJECTION, SOLUTION INTRAMUSCULAR; INTRAVENOUS
Status: DISPENSED
Start: 2025-02-12

## (undated) RX ORDER — HEPARIN SODIUM 1000 [USP'U]/ML
INJECTION, SOLUTION INTRAVENOUS; SUBCUTANEOUS
Status: DISPENSED
Start: 2019-04-12

## (undated) RX ORDER — PROTAMINE SULFATE 10 MG/ML
INJECTION, SOLUTION INTRAVENOUS
Status: DISPENSED
Start: 2024-05-02

## (undated) RX ORDER — REGADENOSON 0.08 MG/ML
INJECTION, SOLUTION INTRAVENOUS
Status: DISPENSED
Start: 2024-05-09

## (undated) RX ORDER — FUROSEMIDE 10 MG/ML
INJECTION INTRAMUSCULAR; INTRAVENOUS
Status: DISPENSED
Start: 2017-11-06

## (undated) RX ORDER — HEPARIN SODIUM 200 [USP'U]/100ML
INJECTION, SOLUTION INTRAVENOUS
Status: DISPENSED
Start: 2024-11-22

## (undated) RX ORDER — LIDOCAINE HYDROCHLORIDE 10 MG/ML
INJECTION, SOLUTION EPIDURAL; INFILTRATION; INTRACAUDAL; PERINEURAL
Status: DISPENSED
Start: 2019-04-12

## (undated) RX ORDER — FENTANYL CITRATE 50 UG/ML
INJECTION, SOLUTION INTRAMUSCULAR; INTRAVENOUS
Status: DISPENSED
Start: 2024-12-10

## (undated) RX ORDER — PROTAMINE SULFATE 10 MG/ML
INJECTION, SOLUTION INTRAVENOUS
Status: DISPENSED
Start: 2017-11-06

## (undated) RX ORDER — PROTAMINE SULFATE 10 MG/ML
INJECTION, SOLUTION INTRAVENOUS
Status: DISPENSED
Start: 2024-12-10

## (undated) RX ORDER — POTASSIUM CHLORIDE 1.5 G/1.58G
POWDER, FOR SOLUTION ORAL
Status: DISPENSED
Start: 2022-08-22

## (undated) RX ORDER — HEPARIN SODIUM 1000 [USP'U]/ML
INJECTION, SOLUTION INTRAVENOUS; SUBCUTANEOUS
Status: DISPENSED
Start: 2024-12-10

## (undated) RX ORDER — FUROSEMIDE 10 MG/ML
INJECTION INTRAMUSCULAR; INTRAVENOUS
Status: DISPENSED
Start: 2019-04-12

## (undated) RX ORDER — POTASSIUM CHLORIDE 1500 MG/1
TABLET, EXTENDED RELEASE ORAL
Status: DISPENSED
Start: 2023-10-06

## (undated) RX ORDER — HEPARIN SODIUM 1000 [USP'U]/ML
INJECTION, SOLUTION INTRAVENOUS; SUBCUTANEOUS
Status: DISPENSED
Start: 2025-02-12

## (undated) RX ORDER — LIDOCAINE HYDROCHLORIDE 10 MG/ML
INJECTION, SOLUTION EPIDURAL; INFILTRATION; INTRACAUDAL; PERINEURAL
Status: DISPENSED
Start: 2024-05-02

## (undated) RX ORDER — POTASSIUM CHLORIDE 1500 MG/1
TABLET, EXTENDED RELEASE ORAL
Status: DISPENSED
Start: 2022-08-22

## (undated) RX ORDER — BUPIVACAINE HYDROCHLORIDE 2.5 MG/ML
INJECTION, SOLUTION EPIDURAL; INFILTRATION; INTRACAUDAL
Status: DISPENSED
Start: 2025-02-12

## (undated) RX ORDER — LIDOCAINE HYDROCHLORIDE 10 MG/ML
INJECTION, SOLUTION EPIDURAL; INFILTRATION; INTRACAUDAL; PERINEURAL
Status: DISPENSED
Start: 2025-02-12

## (undated) RX ORDER — MAGNESIUM SULFATE HEPTAHYDRATE 40 MG/ML
INJECTION, SOLUTION INTRAVENOUS
Status: DISPENSED
Start: 2022-08-22

## (undated) RX ORDER — PROTAMINE SULFATE 10 MG/ML
INJECTION, SOLUTION INTRAVENOUS
Status: DISPENSED
Start: 2019-04-12

## (undated) RX ORDER — POTASSIUM CHLORIDE 1500 MG/1
TABLET, EXTENDED RELEASE ORAL
Status: DISPENSED
Start: 2024-11-22

## (undated) RX ORDER — FENTANYL CITRATE 50 UG/ML
INJECTION, SOLUTION INTRAMUSCULAR; INTRAVENOUS
Status: DISPENSED
Start: 2023-03-22

## (undated) RX ORDER — LIDOCAINE HYDROCHLORIDE 10 MG/ML
INJECTION, SOLUTION EPIDURAL; INFILTRATION; INTRACAUDAL; PERINEURAL
Status: DISPENSED
Start: 2017-11-06

## (undated) RX ORDER — CEFAZOLIN SODIUM 2 G/100ML
INJECTION, SOLUTION INTRAVENOUS
Status: DISPENSED
Start: 2025-02-12

## (undated) RX ORDER — HEPARIN SODIUM 200 [USP'U]/100ML
INJECTION, SOLUTION INTRAVENOUS
Status: DISPENSED
Start: 2024-12-10

## (undated) RX ORDER — MAGNESIUM SULFATE HEPTAHYDRATE 40 MG/ML
INJECTION, SOLUTION INTRAVENOUS
Status: DISPENSED
Start: 2024-07-24

## (undated) RX ORDER — FENTANYL CITRATE 50 UG/ML
INJECTION, SOLUTION INTRAMUSCULAR; INTRAVENOUS
Status: DISPENSED
Start: 2019-04-12

## (undated) RX ORDER — LIDOCAINE HYDROCHLORIDE 10 MG/ML
INJECTION, SOLUTION EPIDURAL; INFILTRATION; INTRACAUDAL; PERINEURAL
Status: DISPENSED
Start: 2024-12-10

## (undated) RX ORDER — FENTANYL CITRATE 50 UG/ML
INJECTION, SOLUTION INTRAMUSCULAR; INTRAVENOUS
Status: DISPENSED
Start: 2017-11-06

## (undated) RX ORDER — MAGNESIUM SULFATE HEPTAHYDRATE 40 MG/ML
INJECTION, SOLUTION INTRAVENOUS
Status: DISPENSED
Start: 2023-09-13

## (undated) RX ORDER — POTASSIUM CHLORIDE 1500 MG/1
TABLET, EXTENDED RELEASE ORAL
Status: DISPENSED
Start: 2017-12-19